# Patient Record
Sex: MALE | Race: WHITE | NOT HISPANIC OR LATINO | Employment: UNEMPLOYED | URBAN - METROPOLITAN AREA
[De-identification: names, ages, dates, MRNs, and addresses within clinical notes are randomized per-mention and may not be internally consistent; named-entity substitution may affect disease eponyms.]

---

## 2017-01-09 ENCOUNTER — GENERIC CONVERSION - ENCOUNTER (OUTPATIENT)
Dept: OTHER | Facility: OTHER | Age: 57
End: 2017-01-09

## 2017-01-12 ENCOUNTER — ALLSCRIPTS OFFICE VISIT (OUTPATIENT)
Dept: OTHER | Facility: OTHER | Age: 57
End: 2017-01-12

## 2017-01-19 ENCOUNTER — GENERIC CONVERSION - ENCOUNTER (OUTPATIENT)
Dept: OTHER | Facility: OTHER | Age: 57
End: 2017-01-19

## 2017-03-20 ENCOUNTER — GENERIC CONVERSION - ENCOUNTER (OUTPATIENT)
Dept: OTHER | Facility: OTHER | Age: 57
End: 2017-03-20

## 2017-06-01 ENCOUNTER — GENERIC CONVERSION - ENCOUNTER (OUTPATIENT)
Dept: OTHER | Facility: OTHER | Age: 57
End: 2017-06-01

## 2017-07-07 ENCOUNTER — ALLSCRIPTS OFFICE VISIT (OUTPATIENT)
Dept: OTHER | Facility: OTHER | Age: 57
End: 2017-07-07

## 2017-07-07 DIAGNOSIS — I25.10 ATHEROSCLEROTIC HEART DISEASE OF NATIVE CORONARY ARTERY WITHOUT ANGINA PECTORIS: ICD-10-CM

## 2017-07-07 DIAGNOSIS — E78.5 HYPERLIPIDEMIA: ICD-10-CM

## 2017-07-10 ENCOUNTER — ALLSCRIPTS OFFICE VISIT (OUTPATIENT)
Dept: OTHER | Facility: OTHER | Age: 57
End: 2017-07-10

## 2018-01-10 NOTE — PROGRESS NOTES
Assessment  Assessed    1  Acute pericarditis (420 90) (I30 9)   2  Hypertension (401 9) (I10)   3  Tachycardia (785 0) (R00 0)    Plan  Acute pericarditis, Tachycardia    · Colchicine 0 6 MG Oral Tablet; 1 tab bid   Rx By: Jose Luis Pack; Dispense: 30 Days ; #:60 Tablet; Refill: 3; For: Acute pericarditis, Tachycardia; JUAN = N; Verified Transmission to [facility]/PHARMACY #[id number] Last Updated By: System, SureScripts; 12/23/2016 2:36:55 PM  Atherosclerosis of coronary artery    · Metoprolol Tartrate 25 MG Oral Tablet; TAKE 0 5 TABLET Every twelve hours   Rx By: Jose Luis Pack; Dispense: 30 Days ; #:30 Tablet; Refill: 3; For: Atherosclerosis of coronary artery; JUAN = N; Verified Transmission to [facility]/PHARMACY #[id number] Last Updated By: System, SureScripts; 12/23/2016 2:36:56 PM   · EKG/ECG- POC; Status:Complete;   Done: 18SBC9582   Perform: In Office; [phone]; Last Updated By:Mira Eastman; 12/23/2016 2:11:38 PM;Ordered; For:Atherosclerosis of coronary artery; Ordered By:Ilene Lopez; Hypertension    · Aspirin  MG Oral Tablet Delayed Release; TAKE 1 TABLET BY MOUTH  ONCE DAILY   Rx By: Jose Luis Pack; Dispense: 30 Days ; #:90 Tablet Delayed Release; Refill: 3; For: Hypertension; JUAN = N; Verified Transmission to [facility]/PHARMACY #[id number] Last Updated By: System, SureScgregor; 12/23/2016 2:42:20 PM  Tachycardia    · Eat a low fat and low cholesterol diet ; Status:Complete;   Done: 24LXW0373   Ordered; For:Tachycardia; Ordered By:Yoon Lopez;   · Restrict the salt in your diet by avoiding highly salted foods ; Status:Complete;   Done:  99KPO7972   Ordered; For:Tachycardia; Ordered By:Yoon Lopez;   · 1 - Kimo Toledo  (Cardiology) Physician Referral  Consult Only: the expectation is that  the referring provider will communicate back to the patient on treatment options    Evaluation and Treatment: the expectation is that the referred to provider will  communicate back to the patient on treatment options  Status: Complete  Done:  17CSX3014   Ordered; For: Tachycardia; Ordered By: Kari Lowery Performed:  Due: 28BCR6589; Last Updated By: Jose Berry; 12/23/2016 2:37:48 PM  Care Summary provided  : Yes    Discussion/Summary  Cardiology Discussion Summary Free Text Note Form [facility]:   Mr Suzy Cunningham presents to our office today for a recent hospitalization follow-up visit with Pericarditis  Mr Suzy Cunningham stopped taking metoprolol on his own due to what he states was "blood pressure  Did not actually take his blood pressure  Has not taken this for a week  He is tachycardic   I educated him on his medical regimen and the reasons for taking medicine  His blood pressure is 120/70 and  BPN  He will decrease EC ASA 1 pill three times a day for 5 days then 1 pill 2 times a day for 5 days then 1 pill daily, Metoprolol 12 5mg Q12 hours, take Linisopril at 6pm  Continue Colchicine 0 6mg BID  Follow up with Dr Price Torres in one month  Chief Complaint  Chief Complaint Free Text Note Form: Pt here for a hospital f/u  Pt c/o SOB      History of Present Illness  Cardiology HPI Free Text Note Form [facility]: Mr Jono Mcnamara is a 49year old male with a known past medical history of HTN, COPD, Tobacco abuse, CAD sp stent to RCA 2012, HTN,and Colitis  He presented to [address] [place] [address] with complaints of chest pain described as sharp chest pressure across his entire chest, aggravated with lying down and deep inspiration, relieved with sitting up forward  EKG showed localized ST elevations   He underwent a cardiac catheterization which did not show any progression of cardiac disease, left main vessel is normal-sized, LAD 20% stenosis  Circumflex showed minor luminal irregularities  Midcircumflex 70% stenosis, RCA medium size when her luminal irregularities 0% stenosis at the site of the prior stent  He was diagnosed with acute pericarditis  2-D echocardiogram  Left ventricular systolic function  Upper limits of normal, EF 55%, mild aortic valve stenosis  Trace aortic valve regurgitation, trace tricuspid valve regurgitation, trace pericardial effusion  He was started on high-dose aspirin 650 mg 3 times a day and Colchicine BID  Chest pain improved  Today Mr Lulú Gallagher presents to our office for recent hospitalization follow-up visit  She denies chest pain, palpitations, lightheadedness, or dizziness  He continues to smoke and has no plans on quitting taking all his medications as prescribed  He admits to dyspnea with moderate exertion  He is not taking his medications appropriately  His stop his metoprolol due to what he states his hypotension, but has not checked his blood pressure  He was experiencing some lightheadedness and dizziness but this stopped this medication on his own  Review of Systems  Cardiology Male ROS:     Cardiac: No complaints of chest pain, no palpitations, no fainiting  Skin: No complaints of nonhealing sores or skin rash  Genitourinary: No complaints of recurrent urinary tract infections, frequent urination at night, difficult urination, blood in urine, kidney stones, loss of bladder control, no kidney or prostate problems, no erectile dysfunction  Psychological: No complaints of feeling depressed, anxiety, panic attacks, or difficulty concentrating  General: lack of energy/fatigue  Respiratory: shortness of breath, but as noted in HPI  HEENT: No complaints of serious problems, hearing problems, nose problems, throat problems, or snoring  Gastrointestinal: No complaints of liver problems, nausea, vomiting, heartburn, constipation, bloody stools, diarrhea, problems swallowing, adbominal pain, or rectal bleeding  Hematologic: No complaints of bleeding disorders, anemia, blood clots, or excessive brusing     Neurological: No complaints of numbness, tingling, dizziness, weakness, seizures, headaches, syncope or fainting, AM fatigue, daytime sleepiness, no witnessed apnea episodes  Musculoskeletal: No complaints of arthritis, back pain, or painfull swelling  Active Problems  Problems    1  Abdominal pain (789 00) (R10 9)   2  Abnormal MRI, cervical spine (793 7) (R93 7)   3  Arm paresthesia, left (782 0) (R20 2)   4  Asthma (493 90) (J45 909)   5  Atherosclerosis of coronary artery (414 00) (I25 10)   6  Cervical herniated disc (722 0) (M50 20)   7  Cervical pain (neck) (723 1) (M54 2)   8  Cervical radiculopathy (723 4) (M54 12)   9  Cervical spinal stenosis (723 0) (M48 02)   10  Cervical spinal stenosis (723 0) (M48 02)   11  Cervicalgia (723 1) (M54 2)   12  Chronic obstructive pulmonary disease (496) (J44 9)   13  Colon cancer screening (V76 51) (Z12 11)   14  Diarrhea (787 91) (R19 7)   15  Dyslipidemia (272 4) (E78 5)   16  History of right hemicolectomy (V45 89) (Z90 49)   17  Hypertension (401 9) (I10)   18  Instability of shoulder joint, 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)   19  Left shoulder pain (719 41) (M25 512)   20  Myelopathy, spondylogenic, 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)   21  Need for immunization against influenza (V04 81) (Z23)   22  Oral candidiasis (112 0) (B37 0)   23  Presence of stent in coronary artery in patient with coronary artery disease    (414 01,V45 82) (I25 10,Z95 5)   24  Scoliosis (737 30) (M41 9)   25  Subacromial impingement of left shoulder (726 19) (M75 42)    Past Medical History  Problems    1  History of Anxiety (300 00) (F41 9)   2  Asthma (493 90) (J45 909)   3  History of Granular Cell Carcinoma (199 1)   4  History of esophageal reflux (V12 79) (Z87 19)   5  History of gastritis (V12 79) (Z87 19)   6  Old myocardial infarction (412) (I25 2)   7  History of Pneumonia (V12 61)   8  History of Type 2 diabetes, diet controlled (250 00) (E11 9)  Active Problems And Past Medical History Reviewed: The active problems and past medical history were reviewed and updated today  Surgical History  Problems    1   History of Cardiac Cath Procedure Summary   2  History of Colon Surgery   3  History of Right Hemicolectomy   4  History of Tonsillectomy  Surgical History Reviewed: The surgical history was reviewed and updated today  Family History  Father    1  Family history of Coronary Artery Disease (V17 49)   2  Family history of Crohn's Disease   3  Family history of Stroke Syndrome (V17 1)  Unknown    4  Family history of diabetes mellitus (V18 0) (Z83 3)  Family History Reviewed: The family history was reviewed and updated today  Social History  Problems    · Alcohol Use (History)   · Cigars (3  A Day) (305 1)   · Current every day smoker (305 1) (F17 200)   · Drinks beer (V49 89) (Z78 9)   · Marital History -    · Tobacco use (305 1) (Z72 0)  Social History Reviewed: The social history was reviewed and updated today  The social history was reviewed and is unchanged  Current Meds   1  Albuterol Sulfate (2 5 MG/3ML) 0 083% Inhalation Nebulization Solution; USE 1 UNIT   DOSE EVERY 4-6 HOURS AS NEEDED FOR WHEEZING ; Therapy: 37ZIN5809 to (Last Rx:06Jun2016)  Requested for: 06Jun2016 Ordered   2  Aspirin 81 MG Oral Tablet Delayed Release; Take one tablet once daily; Therapy: 18NQJ5794 to (Evaluate:02Jan2017)  Requested for: 77DXW7223; Last   Rx:06Jun2016 Ordered   3  Dulera 200-5 MCG/ACT Inhalation Aerosol; INHALE 2 PUFFS AT 12 HOUR INTERVALS   (MORNING AND EVENING); Therapy: 38HNK1977 to (Last Kalpesh Held)  Requested for: 94HXO4124 Ordered   4  Gabapentin 300 MG Oral Capsule; TAKE 1 CAPSULE 3 TIMES DAILY; Therapy: 00NZJ5460 to (Evaluate:01Jun2017)  Requested for: 98OMU8923; Last   YV:27NHP0808 Ordered   5  Lisinopril 2 5 MG Oral Tablet; TAKE 1 TABLET DAILY; Therapy: 86EXT9862 to [phone])  Requested for: 93DFR5767; Last   Rx:08Mar2016 Ordered   6  Metoprolol Tartrate 25 MG Oral Tablet; Take 1 tablet twice daily;    Therapy: 45WCN0477 to (Evaluate:01Jun2017)  Requested for: 33EBE0543; Last   IX:10SHK0270 Ordered   7  Spiriva Respimat 2 5 MCG/ACT Inhalation Aerosol Solution; inhale 2 puffs once daily; Therapy: 84EXI5724 to (Toomsborofidencio Garcias)  Requested for: 93LUD0994; Last   Rx:13Mar2016 Ordered   8  Ventolin  (90 Base) MCG/ACT Inhalation Aerosol Solution; inhale 1 to 2 puffs   every 4 to 6 hours if needed; Therapy: 10ZJR8240 to (Luigi Latha)  Requested for: 29Nov2016; Last   Rx:29Nov2016 Ordered  Medication List Reviewed: The medication list was reviewed and updated today  Allergies  Medication    1  No Known Drug Allergies    Vitals  Vital Signs    Recorded: 26Dec2016 11:21AM Recorded: 98Veg1517 02:03PM   Heart Rate  99   Systolic 700, RUE, Sitting 100, LUE, Sitting   Diastolic 70, RUE, Sitting 62, LUE, Sitting   Height  5 ft 6 in   Weight  151 lb 4 oz   BMI Calculated  24 41   BSA Calculated  1 78     Physical Exam    Constitutional   General appearance: No acute distress, well appearing and well nourished  Neck   Neck and thyroid: Normal, supple, trachea midline, no thyromegaly  Pulmonary   Respiratory effort: No increased work of breathing or signs of respiratory distress  Auscultation of lungs: Clear to auscultation, no rales, no rhonchi, no wheezing, good air movement  Cardiovascular   Examination of extremities for edema and/or varicosities: Normal     Abdomen   Abdomen: Non-tender and no distention  Musculoskeletal Gait and station: Normal gait  Neurologic - Speech: Normal     Psychiatric - Orientation to person, place, and time: Normal  Mood and affect: Normal       Future Appointments    Date/Time Provider Specialty Site   01/12/2017 09:40 AM ARGENIS Merchant   Cardiology [facility]   03/16/2017 01:10 PM Rima Osei, DO Internal Medicine [address] PCP     Signatures   Electronically signed by : Mary Ann Frankel; Dec 26 2016 11:24AM EST                       (Author)    Electronically signed by : Heaven Dunn Shandra Cintron; Dec 26 2016 11:25AM EST                       (Author)    Electronically signed by : ARGENIS Springer PhD; Jan 9 2017  4:18PM EST                       (Author)

## 2018-01-10 NOTE — RESULT NOTES
Message   Recorded as Task   Date: 05/31/2016 10:52 AM, Created By: Jairo Fuchs   Task Name: Miscellaneous   Assigned To: [facility] [facility]   Regarding Patient: Jena Saldana, Status: Active   Comment:    Jairo Fuchs - 31 May 2016 10:52 AM     TASK CREATED  Caller: Self; [phone] (Home); [phone] x,,,,, (Work)  Patient called to see if he is able to just schedule the inj  He spoke w/ Dr Sade Galindo patient said Dr Sade Galindo said to go for it  Patient is being r/s from an appt spot that you will no longer be here  He stated he can not wait until 6/16 to r/s  Please advise  Thank you   Rafiq Dominguez - 31 May 2016 1:06 PM     TASK REPLIED TO: Previously Assigned To [facility] [facility],Team  There was a task on 6/18 where I said we can schedule for JAQUI  Brittnee Chen - 31 May 2016 1:17 PM     TASK REPLIED TO: Previously Assigned To [facility] [facility],Team  Spoke with pt and scheduled for JAQUI 6-22-16  Pre procedure instructions reviewed:NPO 1 hr prior,needs ,denies anticoags and aware to call with illness/antibiotics  Verbalizes understanding  Signatures   Electronically signed by :  Rossy Gipson, ; May 31 2016  1:17PM EST                       (Author)

## 2018-01-10 NOTE — PROGRESS NOTES
History of Present Illness  Care Coordination Encounter Information:   Type of Encounter: Telephonic    Spoke to Patient   Voicemail left, await a call back  Care Coordination SL Nurse [facility]:   The reason for call is to discuss outreach for follow up/needed services  Follow up call missed PCP office visit 3/16  Active Problems    1  Abdominal pain (789 00) (R10 9)   2  Acute coronary syndrome (411 1) (I24 9)   3  Acute pericarditis (420 90) (I30 9)   · 12/2016- also had cath, RCA stent patent   4  Arm paresthesia, left (782 0) (R20 2)   5  Asthma (493 90) (J45 909)   6  Atherosclerosis of coronary artery (414 00) (I25 10)   7  Cervical herniated disc (722 0) (M50 20)   8  Cervical spinal stenosis (723 0) (M48 02)   9  Colon cancer screening (V76 51) (Z12 11)   10  COPD (chronic obstructive pulmonary disease) (496) (J44 9)   11  Coronary artery disease (414 00) (I25 10)   12  Diarrhea (787 91) (R19 7)   13  Dyslipidemia (272 4) (E78 5)   14  History of right hemicolectomy (V45 89) (Z98 890,Z90 49)   15  Hypertension (401 9) (I10)   16  Leukocytosis (288 60) (D72 829)   17  Need for immunization against influenza (V04 81) (Z23)   18  Oral candidiasis (112 0) (B37 0)   19  Presence of stent in coronary artery in patient with coronary artery disease    (414 01,V45 82) (I25 10,Z95 5)   20  S/P drug eluting coronary stent placement (V45 82) (Z95 5)   21  Scoliosis (737 30) (M41 9)   22  Subacromial impingement of left shoulder (726 19) (M75 42)   23  Tachycardia (785 0) (R00 0)    Past Medical History    1  History of Anxiety (300 00) (F41 9)   2  Asthma (493 90) (J45 909)   3  History of Colitis (558 9) (K52 9)   4  History of Granular Cell Carcinoma (199 1)   5  History of chest pain (V13 89) (Z87 898)   6  History of esophageal reflux (V12 79) (Z87 19)   7  History of gastritis (V12 79) (Z87 19)   8  Old myocardial infarction (412) (I25 2)   9  History of Pneumonia (V12 61)   10   History of Type 2 diabetes, diet controlled (250 00) (E11 9)    Surgical History    1  History of Cardiac Cath Procedure Summary   2  History of Colon Surgery   3  History of Right Hemicolectomy   4  History of Stent Indications: Acute Myocardial Infarction   5  History of Tonsillectomy    Family History  Father    1  Family history of Coronary Artery Disease (V17 49)   2  Family history of Crohn's Disease   3  Family history of Stroke Syndrome (V17 1)  Unknown    4  Family history of diabetes mellitus (V18 0) (Z83 3)    Social History    · Alcohol Use (History)   · Cigars (3  A Day) (305 1)   · Current every day smoker (305 1) (F17 200)   · Drinks beer (V49 89) (Z78 9)   · Marital History -    · Tobacco use (305 1) (Z72 0)    Current Meds    1  Dulera 200-5 MCG/ACT Inhalation Aerosol; INHALE 2 PUFFS AT 12 HOUR INTERVALS   (MORNING AND EVENING); Therapy: 91XRT5467 to (Ricky Pfeiffer)  Requested for: 70VOX4160 Ordered    2  Metoprolol Tartrate 25 MG Oral Tablet; TAKE 0 5 TABLET Every twelve hours; Therapy: 88EAC8830 to (Evaluate:08Ezo6594)  Requested for: 39Ins0215; Last   Rx:04Cgu9053 Ordered    3  Gabapentin 300 MG Oral Capsule; TAKE 1 CAPSULE 3 TIMES DAILY; Therapy: 84UVR2997 to (Evaluate:60Ioo6814)  Requested for: 82Jyo2857; Last   Rx:67Ytz0968 Ordered    4  Spiriva Respimat 2 5 MCG/ACT Inhalation Aerosol Solution; inhale 2 puffs once daily; Therapy: 77KSG3228 to (Allyn Gandara)  Requested for: 55KNA3642; Last   Rx:13Mar2016 Ordered   5  Ventolin  (90 Base) MCG/ACT Inhalation Aerosol Solution; inhale 1 to 2 puffs   every 4 to 6 hours if needed; Therapy: 32CHJ1846 to (Armando Read)  Requested for: 88VED2220; Last   Rx:29Nov2016 Ordered    6  Albuterol Sulfate (2 5 MG/3ML) 0 083% Inhalation Nebulization Solution; USE 1 UNIT   DOSE EVERY 4-6 HOURS AS NEEDED FOR WHEEZING ; Therapy: 39ODU7466 to (Last Rx:37Jag1366)  Requested for: 17Kzx5129 Ordered    7   Aspirin EC 81 MG Oral Tablet Delayed Release; TAKE 1 TABLET DAILY AS DIRECTED; Therapy: 40Nxe6289 to (Evaluate:12May2017)  Requested for: 59BAT4863; Last   Rx:12Jan2017 Ordered   8  Lisinopril 2 5 MG Oral Tablet; TAKE 1 TABLET DAILY; Therapy: 52RZW5673 to [phone])  Requested for: 13RGJ0787; Last   Rx:08Mar2016 Ordered    Allergies    1  No Known Drug Allergies    End of Encounter Meds    1  Dulera 200-5 MCG/ACT Inhalation Aerosol; INHALE 2 PUFFS AT 12 HOUR INTERVALS   (MORNING AND EVENING); Therapy: 18ZSZ8718 to (Last [place])  Requested for: 15JJE1307 Ordered    2  Metoprolol Tartrate 25 MG Oral Tablet; TAKE 0 5 TABLET Every twelve hours; Therapy: 65KRC9207 to (Evaluate:22Apr2017)  Requested for: 10Qmo3548; Last   Rx:34Wnm9503 Ordered    3  Gabapentin 300 MG Oral Capsule; TAKE 1 CAPSULE 3 TIMES DAILY; Therapy: 65DPR9350 to (Evaluate:39Fqx2877)  Requested for: 54Ygl4509; Last   Rx:29Dec2016 Ordered    4  Spiriva Respimat 2 5 MCG/ACT Inhalation Aerosol Solution; inhale 2 puffs once daily; Therapy: 97SLB8563 to (Josefa Antonio)  Requested for: 54ZDU8008; Last   Rx:13Mar2016 Ordered   5  Ventolin  (90 Base) MCG/ACT Inhalation Aerosol Solution; inhale 1 to 2 puffs   every 4 to 6 hours if needed; Therapy: 13PBJ3738 to [facility]  Requested for: 10BLC3907; Last   Rx:29Nov2016 Ordered    6  Albuterol Sulfate (2 5 MG/3ML) 0 083% Inhalation Nebulization Solution; USE 1 UNIT   DOSE EVERY 4-6 HOURS AS NEEDED FOR WHEEZING ; Therapy: 07LEP2617 to (Last Rx:44Ctn1743)  Requested for: 27Eku6629 Ordered    7  Aspirin EC 81 MG Oral Tablet Delayed Release; TAKE 1 TABLET DAILY AS DIRECTED; Therapy: 72Yiv1740 to (Evaluate:12May2017)  Requested for: 36XLZ7166; Last   Rx:12Jan2017 Ordered   8  Lisinopril 2 5 MG Oral Tablet; TAKE 1 TABLET DAILY;    Therapy: 50SSU0085 to [phone])  Requested for: 81ORQ9215; Last   Rx:08Mar2016 Ordered    Patient Care Team    Care Team Member Role Specialty Office Number Amy Byrnes   [phone]   Tana Puri MD Referring Pain Management [phone]   Ioana IVORY   Specialist Neurosurgery [phone]   Maritza Heredia MD Specialist Colon and Rectal Surgery [phone]   [facility] [address] [address] 1478   Electronically signed by : Santino Rowland, ; Mar 20 2017  2:36PM EST                       (Author)

## 2018-01-11 NOTE — RESULT NOTES
Message   Recorded as Task   Date: 05/16/2016 02:40 PM, Created By: Jovany Guidry   Task Name: Follow Up   Assigned To: [facility] surgery sched,Team   Regarding Patient: Brittany Maguire, Status: Active   Comment:    Yue Stevenson - 16 May 2016 2:40 PM     TASK CREATED  Caller: Self; General Medical Question; [phone] (Home)    *******FYI***************  Message transferred from [facility]  to RN triage line today at [phone]  Pt states he needs to set up follow up appt with Dr Jenny Barboza, states he had appt with Dr Sade Galindo today, pt requesting CB so he can explain  States he wants pt to do shots right away  Requesting CB    I spoke with pt, states Dr Sade Galindo advised pt he is a candidate for surgery, but pt cannot have surgery at this time  States Dr Sade Galindo is in agreement that he can have injections and medications and will follow up in 3 months  Advised that I can schedule OV 5/24/16 at 0915  I advised that [place] Jenny Barboza [place] [facility] [place]  Pt upset that he will not be getting injection at that time  Advised that Dr Jenny Barboza will need to eval   ******************   Rafiq Dominguez - 17 May 2016 9:44 AM     TASK REPLIED TO: Previously Assigned To [facility],Team  D/w Dr Sade Galindo  I've also discussed JAQUI with patient  We can schedule patient for JAQUI  Please review risk of infection, bleeding nerve injury with him  Nereyda Murrell - 17 May 2016 11:10 AM     TASK EDITED   Dyke Rasgenesis - 18 May 8630 8:12 PM     TASK EDITED  Patient scheduled for 5/24/2016 - Should this be cx and just scheduled for Injection? Thanks  Rafiq Dominguez - 18 May 2016 1:45 PM     TASK REPLIED TO: Previously Assigned To Rafiq Dominguez  We can schedule JAQUI  If the patient needs to discuss anything or go over procedure, we can keep povs   Brittnee Chen - 31 May 2016 1:17 PM     TASK REPLIED TO: Previously Assigned To [facility] surgery sched,Team  Addressed in another task          Signatures   Electronically signed by : [facility], ; May 31 2016  1:18PM EST                       (Author)

## 2018-01-11 NOTE — PROGRESS NOTES
History of Present Illness  Care Coordination Encounter Information:   Type of Encounter: Telephonic    Spoke to Patient   Voicemail left, await a call back  Care Coordination SL Nurse [facility]:   The reason for call is to discuss outreach for follow up/needed services  Follow up checking for medication compliance, hospital discharge on 12/8/16, cardiology office visit 12/23/16  This is second phone call  Patient returned my call  He denies chest pain, states he is short of breath but not more than usual, has a history of COPD  I asked if he was taking his medications as prescribed, there is a history of him not taking certain medications  He stated he is following all instructions as per cardiology office visit on 12/23/16  He is aware of his upcoming appointment on 1/12 with cardiology  He did ask if he should take his Aspirin 81 mg along with Aspirin  mg  In reviewing cardiology note he should be on the 325 mg only,I discussed this with him, verbal understanding  This is a follow up call for PCP office, 12/27/16 hospital follow up appointment was cancelled   Active Problems    1  Abdominal pain (789 00) (R10 9)   2  Abnormal MRI, cervical spine (793 7) (R93 7)   3  Acute coronary syndrome (411 1) (I24 9)   4  Acute pericarditis (420 90) (I30 9)   5  Arm paresthesia, left (782 0) (R20 2)   6  Asthma (493 90) (J45 909)   7  Atherosclerosis of coronary artery (414 00) (I25 10)   8  Cervical herniated disc (722 0) (M50 20)   9  Cervical pain (neck) (723 1) (M54 2)   10  Cervical radiculopathy (723 4) (M54 12)   11  Cervical spinal stenosis (723 0) (M48 02)   12  Cervical spinal stenosis (723 0) (M48 02)   13  Cervicalgia (723 1) (M54 2)   14  Chronic obstructive pulmonary disease (496) (J44 9)   15  Colon cancer screening (V76 51) (Z12 11)   16  COPD (chronic obstructive pulmonary disease) (496) (J44 9)   17  Diarrhea (787 91) (R19 7)   18  Dyslipidemia (272 4) (E78 5)   19   History of right hemicolectomy (V45 89) (Z90 49)   20  Hypertension (401 9) (I10)   21  Instability of shoulder joint, 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)   22  Left shoulder pain (719 41) (M25 512)   23  Leukocytosis (288 60) (D72 829)   24  Myelopathy, spondylogenic, 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)   25  Need for immunization against influenza (V04 81) (Z23)   26  Oral candidiasis (112 0) (B37 0)   27  Presence of stent in coronary artery in patient with coronary artery disease    (414 01,V45 82) (I25 10,Z95 5)   28  Scoliosis (737 30) (M41 9)   29  Subacromial impingement of left shoulder (726 19) (M75 42)   30  Tachycardia (785 0) (R00 0)    Past Medical History    1  History of Anxiety (300 00) (F41 9)   2  Asthma (493 90) (J45 909)   3  History of Colitis (558 9) (K52 9)   4  History of Granular Cell Carcinoma (199 1)   5  History of chest pain (V13 89) (Z87 898)   6  History of esophageal reflux (V12 79) (Z87 19)   7  History of gastritis (V12 79) (Z87 19)   8  Old myocardial infarction (412) (I25 2)   9  History of Pneumonia (V12 61)   10  History of Type 2 diabetes, diet controlled (250 00) (E11 9)    Surgical History    1  History of Cardiac Cath Procedure Summary   2  History of Colon Surgery   3  History of Right Hemicolectomy   4  History of Stent Indications: Acute Myocardial Infarction   5  History of Tonsillectomy    Family History  Father    1  Family history of Coronary Artery Disease (V17 49)   2  Family history of Crohn's Disease   3  Family history of Stroke Syndrome (V17 1)  Unknown    4  Family history of diabetes mellitus (V18 0) (Z83 3)    Social History    · Alcohol Use (History)   · Cigars (3  A Day) (305 1)   · Current every day smoker (305 1) (F17 200)   · Drinks beer (V49 89) (Z78 9)   · Marital History -    · Tobacco use (305 1) (Z72 0)    Current Meds    1  Colchicine 0 6 MG Oral Tablet; 1 tab bid; Therapy: 35Vfn1342 to (Evaluate:92Vxc7129)  Requested for: 59Tfj5790; Last   Rx:89Tvm4183 Ordered    2   Chad Delacruz 200-5 MCG/ACT Inhalation Aerosol; INHALE 2 PUFFS AT 12 HOUR INTERVALS   (MORNING AND EVENING); Therapy: 13RRZ0486 to (Ricky Chapa)  Requested for: 99DMV4003 Ordered    3  Aspirin 81 MG Oral Tablet Delayed Release; Take one tablet once daily; Therapy: 13OGP5421 to (Evaluate:02Jan2017)  Requested for: 26KUM3890; Last   Rx:06Jun2016 Ordered   4  Metoprolol Tartrate 25 MG Oral Tablet; TAKE 0 5 TABLET Every twelve hours; Therapy: 25ZFY1878 to (Evaluate:22Apr2017)  Requested for: 75Hkp0929; Last   Rx:93Vme0340 Ordered    5  Gabapentin 300 MG Oral Capsule; TAKE 1 CAPSULE 3 TIMES DAILY; Therapy: 51OBU8883 to (Evaluate:24Dec2017)  Requested for: 35Afq7042; Last   Rx:44Grp0386 Ordered    6  Albuterol Sulfate (2 5 MG/3ML) 0 083% Inhalation Nebulization Solution; USE 1 UNIT   DOSE EVERY 4-6 HOURS AS NEEDED FOR WHEEZING ; Therapy: 53LBG9172 to (Last Rx:06Jun2016)  Requested for: 06Jun2016 Ordered   7  Spiriva Respimat 2 5 MCG/ACT Inhalation Aerosol Solution; inhale 2 puffs once daily; Therapy: 75HIC1205 to (Jersey Adkins)  Requested for: 29PFB2093; Last   Rx:13Mar2016 Ordered   8  Ventolin  (90 Base) MCG/ACT Inhalation Aerosol Solution; inhale 1 to 2 puffs   every 4 to 6 hours if needed; Therapy: 50HHQ5262 to (Sydnee Cheek)  Requested for: 19MUI4423; Last   Rx:29Nov2016 Ordered    9  Aspirin  MG Oral Tablet Delayed Release; TAKE 1 TABLET BY MOUTH ONCE   DAILY; Therapy: 51Jyd1420 to (Evaluate:22Apr2017)  Requested for: 83Tvk6073; Last   Rx:57Gnn6120 Ordered   10  Lisinopril 2 5 MG Oral Tablet; TAKE 1 TABLET DAILY; Therapy: 76BTQ2622 to 72 539 49 26)  Requested for: 23YAZ7240; Last    Rx:08Mar2016 Ordered    Allergies    1  No Known Drug Allergies    End of Encounter Meds    1  Colchicine 0 6 MG Oral Tablet; 1 tab bid; Therapy: 54Lul8091 to (Evaluate:22Apr2017)  Requested for: 25Yas4896; Last   Rx:13Ysj1762 Ordered    2   Dulera 200-5 MCG/ACT Inhalation Aerosol; INHALE 2 PUFFS AT 12 HOUR INTERVALS   (MORNING AND EVENING); Therapy: 25VBN1412 to (Ricky Phillipsise Arron)  Requested for: 00FFK6337 Ordered    3  Aspirin 81 MG Oral Tablet Delayed Release; Take one tablet once daily; Therapy: 88VND7258 to (Evaluate:02Jan2017)  Requested for: 21MMP5312; Last   Rx:06Jun2016 Ordered   4  Metoprolol Tartrate 25 MG Oral Tablet; TAKE 0 5 TABLET Every twelve hours; Therapy: 83TOQ2727 to (Evaluate:22Apr2017)  Requested for: 54Apr1480; Last   Rx:62Tcg8648 Ordered    5  Gabapentin 300 MG Oral Capsule; TAKE 1 CAPSULE 3 TIMES DAILY; Therapy: 74GZX9079 to (Evaluate:66Yyb6776)  Requested for: 97Kto5497; Last   Rx:29Dec2016 Ordered    6  Albuterol Sulfate (2 5 MG/3ML) 0 083% Inhalation Nebulization Solution; USE 1 UNIT   DOSE EVERY 4-6 HOURS AS NEEDED FOR WHEEZING ; Therapy: 56YZG7676 to (Last Rx:06Jun2016)  Requested for: 06Jun2016 Ordered   7  Spiriva Respimat 2 5 MCG/ACT Inhalation Aerosol Solution; inhale 2 puffs once daily; Therapy: 24YGR1310 to (Stephen Mejía)  Requested for: 96RJP7625; Last   Rx:13Mar2016 Ordered   8  Ventolin  (90 Base) MCG/ACT Inhalation Aerosol Solution; inhale 1 to 2 puffs   every 4 to 6 hours if needed; Therapy: 44HDP3245 to (Miley Mortensen)  Requested for: 02LRO8806; Last   Rx:29Nov2016 Ordered    9  Aspirin  MG Oral Tablet Delayed Release; TAKE 1 TABLET BY MOUTH ONCE   DAILY; Therapy: 78Kbk2381 to (Evaluate:22Apr2017)  Requested for: 32Ujh7552; Last   Rx:79Adq8880 Ordered   10  Lisinopril 2 5 MG Oral Tablet; TAKE 1 TABLET DAILY; Therapy: 73EMV0419 to [phone])  Requested for: 91ZZG2551; Last    Rx:08Mar2016 Ordered    Future Appointments    Date/Time Provider Specialty Site   01/12/2017 09:40 AM ARGENIS Rivera   Cardiology [facility]   03/16/2017 01:10 PM Hue Schofield, DO Internal Medicine [address] [facility] PCP     Patient Care Team    Care Team Member Role Specialty Office Number Amrit Borja   [phone]   Karlos Moya MD Referring Pain Management [phone]   uSng IVORY   Specialist Neurosurgery [phone]   oNra Ramirez MD Specialist Colon and Rectal Surgery [phone]   [facility] [address] [phone]   Electronically signed by : Briant Goldberg, ; Jan 9 2017 11:35AM EST                       (Author)    Electronically signed by : Briant Goldberg, ; Jan 9 2017  1:16PM EST                       (Author)    Electronically signed by : Briant Goldberg, ; Jan 9 2017  1:18PM EST                       (Author)

## 2018-01-12 NOTE — RESULT NOTES
Message   Recorded as Task   Date: 06/24/2016 10:18 AM, Created By: Aneesh Sotelo   Task Name: Miscellaneous   Assigned To: [facility] [facility]   Regarding Patient: Elisha Chinchilla, Status: In Progress   Comment:    MayEdelmira garciau - 24 Jun 2016 10:18 AM     TASK CREATED  Received ok to hold aspirin x 6 days from Dr Jhony Moore dated 6-23-16    Pt's JAQUI was cx on 6-22-16 b/c pt did not hold asa-pt was instructed at that time by Dr Unique Reed to start holding aspirin and pt was rescheduled for JAQUI on 6-28-16  VM left for pt to cb-want to inform pt we did receive ok to hold asa and confirm with pt that he is holding asa  MayradhaBrittnee - 24 Jun 2016 10:18 AM     TASK IN PROGRESS   MayBrittnee garcia - 24 Jun 2016 10:21 AM     TASK REASSIGNED: Previously Assigned To [facility] [facility],Team  Spoke with pt and confirmed he has been holding aspirin since 6-22-16  Pt aware we recieved ok from Dr Denny Bro office  Pt aware no NSAIDS 4 days prior to injection-pt denies taking any  Signatures   Electronically signed by :  Jesse Clement, ; Jun 24 2016 10:21AM EST                       (Author)

## 2018-01-12 NOTE — PROGRESS NOTES
History of Present Illness  Care Coordination Encounter Information:   Type of Encounter: Telephonic    Spoke to Patient   Voicemail left, await a call back  Care Coordination SL Nurse Becca Shen:   The reason for call is to discuss outreach for follow up/needed services  Follow up cardiology office visit on 1/12/17, checking for medication compliance  Active Problems    1  Abdominal pain (789 00) (R10 9)   2  Acute coronary syndrome (411 1) (I24 9)   3  Acute pericarditis (420 90) (I30 9)   · 12/2016- also had cath, RCA stent patent   4  Arm paresthesia, left (782 0) (R20 2)   5  Asthma (493 90) (J45 909)   6  Atherosclerosis of coronary artery (414 00) (I25 10)   7  Cervical herniated disc (722 0) (M50 20)   8  Cervical spinal stenosis (723 0) (M48 02)   9  Colon cancer screening (V76 51) (Z12 11)   10  COPD (chronic obstructive pulmonary disease) (496) (J44 9)   11  Coronary artery disease (414 00) (I25 10)   12  Diarrhea (787 91) (R19 7)   13  Dyslipidemia (272 4) (E78 5)   14  History of right hemicolectomy (V45 89) (Z90 49)   15  Hypertension (401 9) (I10)   16  Leukocytosis (288 60) (D72 829)   17  Need for immunization against influenza (V04 81) (Z23)   18  Oral candidiasis (112 0) (B37 0)   19  Presence of stent in coronary artery in patient with coronary artery disease    (414 01,V45 82) (I25 10,Z95 5)   20  S/P drug eluting coronary stent placement (V45 82) (Z95 5)   21  Scoliosis (737 30) (M41 9)   22  Subacromial impingement of left shoulder (726 19) (M75 42)   23  Tachycardia (785 0) (R00 0)    Past Medical History    1  History of Anxiety (300 00) (F41 9)   2  Asthma (493 90) (J45 909)   3  History of Colitis (558 9) (K52 9)   4  History of Granular Cell Carcinoma (199 1)   5  History of chest pain (V13 89) (Z87 898)   6  History of esophageal reflux (V12 79) (Z87 19)   7  History of gastritis (V12 79) (Z87 19)   8  Old myocardial infarction (412) (I25 2)   9   History of Pneumonia (V12 61) 10  History of Type 2 diabetes, diet controlled (250 00) (E11 9)    Surgical History    1  History of Cardiac Cath Procedure Summary   2  History of Colon Surgery   3  History of Right Hemicolectomy   4  History of Stent Indications: Acute Myocardial Infarction   5  History of Tonsillectomy    Family History  Father    1  Family history of Coronary Artery Disease (V17 49)   2  Family history of Crohn's Disease   3  Family history of Stroke Syndrome (V17 1)  Unknown    4  Family history of diabetes mellitus (V18 0) (Z83 3)    Social History    · Alcohol Use (History)   · Cigars (3  A Day) (305 1)   · Current every day smoker (305 1) (F17 200)   · Drinks beer (V49 89) (Z78 9)   · Marital History -    · Tobacco use (305 1) (Z72 0)    Current Meds    1  Dulera 200-5 MCG/ACT Inhalation Aerosol; INHALE 2 PUFFS AT 12 HOUR INTERVALS   (MORNING AND EVENING); Therapy: 85DVI4517 to (Last Suzanyde Many)  Requested for: 16BGL5464 Ordered    2  Metoprolol Tartrate 25 MG Oral Tablet; TAKE 0 5 TABLET Every twelve hours; Therapy: 36TJU8532 to (Evaluate:82Dvd7338)  Requested for: 39Vka5848; Last   Rx:80Zpr6949 Ordered    3  Gabapentin 300 MG Oral Capsule; TAKE 1 CAPSULE 3 TIMES DAILY; Therapy: 15ROA9781 to (Evaluate:70Iyq7080)  Requested for: 72Bvk6858; Last   Rx:79Uik5893 Ordered    4  Albuterol Sulfate (2 5 MG/3ML) 0 083% Inhalation Nebulization Solution; USE 1 UNIT   DOSE EVERY 4-6 HOURS AS NEEDED FOR WHEEZING ; Therapy: 70UTG4982 to (Last Rx:06Jun2016)  Requested for: 06Jun2016 Ordered   5  Spiriva Respimat 2 5 MCG/ACT Inhalation Aerosol Solution; inhale 2 puffs once daily; Therapy: 85BVO0061 to (Gume Carey)  Requested for: 14WSA4899; Last   Rx:13Mar2016 Ordered   6  Ventolin  (90 Base) MCG/ACT Inhalation Aerosol Solution; inhale 1 to 2 puffs   every 4 to 6 hours if needed; Therapy: 19SYH1574 to ([id number])  Requested for: 65VUW1749; Last   Rx:29Nov2016 Ordered    7   Aspirin EC 81 MG Oral Tablet Delayed Release; TAKE 1 TABLET DAILY AS DIRECTED; Therapy: 04Ara5071 to (Evaluate:12May2017)  Requested for: 44ANC7357; Last   Rx:12Jan2017 Ordered   8  Lisinopril 2 5 MG Oral Tablet; TAKE 1 TABLET DAILY; Therapy: 50VXK5167 to [phone])  Requested for: 63OJA9844; Last   Rx:08Mar2016 Ordered    Allergies    1  No Known Drug Allergies    End of Encounter Meds    1  Dulera 200-5 MCG/ACT Inhalation Aerosol; INHALE 2 PUFFS AT 12 HOUR INTERVALS   (MORNING AND EVENING); Therapy: 17FPC5036 to (Last [facility])  Requested for: 31PVI7275 Ordered    2  Metoprolol Tartrate 25 MG Oral Tablet; TAKE 0 5 TABLET Every twelve hours; Therapy: 19VMK9784 to (Evaluate:22Apr2017)  Requested for: 72Xzf4785; Last   Rx:11Szj2555 Ordered    3  Gabapentin 300 MG Oral Capsule; TAKE 1 CAPSULE 3 TIMES DAILY; Therapy: 43ERE8176 to (Evaluate:87Fxh7386)  Requested for: 66Tkt5322; Last   Rx:29Dec2016 Ordered    4  Albuterol Sulfate (2 5 MG/3ML) 0 083% Inhalation Nebulization Solution; USE 1 UNIT   DOSE EVERY 4-6 HOURS AS NEEDED FOR WHEEZING ; Therapy: 78WBJ0212 to (Last Rx:06Jun2016)  Requested for: 06Jun2016 Ordered   5  Spiriva Respimat 2 5 MCG/ACT Inhalation Aerosol Solution; inhale 2 puffs once daily; Therapy: 22FGG4362 to (Claudetta Altes)  Requested for: 01YQF2524; Last   Rx:13Mar2016 Ordered   6  Ventolin  (90 Base) MCG/ACT Inhalation Aerosol Solution; inhale 1 to 2 puffs   every 4 to 6 hours if needed; Therapy: 99DSB2400 to (Sushant Mcfadden)  Requested for: 32QUR8440; Last   Rx:29Nov2016 Ordered    7  Aspirin EC 81 MG Oral Tablet Delayed Release; TAKE 1 TABLET DAILY AS DIRECTED; Therapy: 31Dzs9216 to (Evaluate:12May2017)  Requested for: 92YZF7428; Last   Rx:12Jan2017 Ordered   8  Lisinopril 2 5 MG Oral Tablet; TAKE 1 TABLET DAILY;    Therapy: 94TNQ4233 to [phone])  Requested for: 22BKO8301; Last   Rx:08Mar2016 Ordered    Future Appointments    Date/Time Provider Specialty Site 03/16/2017 01:10 PM Liliane Snell DO Internal Medicine [address] [facility] PCP     Patient Care Team    Care Team Member Role Specialty Office Number   Maggi Jeffrey   [phone]   Azalea Garcia MD Referring Pain Management [phone]   Rubia IVORY   Specialist Neurosurgery [phone]   Sarah Hill MD Specialist Colon and Rectal Surgery [phone]   [facility] [address]  Cardiology [phone]   Electronically signed by : Startup Threads, ; Jan 19 2017 10:23AM EST                       (Author)

## 2018-01-13 VITALS
HEIGHT: 66 IN | DIASTOLIC BLOOD PRESSURE: 70 MMHG | WEIGHT: 152.06 LBS | SYSTOLIC BLOOD PRESSURE: 108 MMHG | HEART RATE: 88 BPM | BODY MASS INDEX: 24.44 KG/M2

## 2018-01-14 VITALS
BODY MASS INDEX: 24.51 KG/M2 | HEIGHT: 66 IN | SYSTOLIC BLOOD PRESSURE: 128 MMHG | WEIGHT: 152.5 LBS | DIASTOLIC BLOOD PRESSURE: 74 MMHG | HEART RATE: 86 BPM

## 2018-01-14 VITALS
HEART RATE: 60 BPM | SYSTOLIC BLOOD PRESSURE: 122 MMHG | BODY MASS INDEX: 24.16 KG/M2 | WEIGHT: 150.35 LBS | HEIGHT: 66 IN | DIASTOLIC BLOOD PRESSURE: 68 MMHG | TEMPERATURE: 97.9 F

## 2018-01-15 NOTE — RESULT NOTES
Message   Recorded as Task   Date: 04/12/2016 07:55 AM, Created By: Zeny Merrill   Task Name: Follow Up   Assigned To: [facility] [facility]   Regarding Patient: Katt Saldana, Status: Active   Comment:    Zeny Merrill - 12 Apr 2016 7:55 AM     TASK CREATED  Please inform patient, his MRI was reviewed by Dr Gabbie Bustamante (neurologist), she recommended neurosurical consult  I will place order and we can mail it to patient  Brittnee Chen - 12 Apr 2016 9:27 AM     TASK REPLIED TO: Previously Assigned To [facility] [facility],Team  Spoke with pt and advised of the same,verbalizes understanding  DR Ameena Watson referral mailed to pt per his request         Signatures   Electronically signed by :  Brittnee Chen, ; Apr 12 2016  9:28AM EST                       (Author)

## 2018-01-15 NOTE — RESULT NOTES
Verified Results  CARDIAC CATHETERIZATION [id number] 06:31AM Joan Almaraz     Test Name Result Flag Reference   CARDIAC CATHETERIZATION (Report)     [address]   [phone]     [facility]     Invasive Cardiovascular Lab Complete Report     Patient: Kevin Clark   MR number: [id number]   Account number: [de-identified]   Study date: 2016   Gender: Male   : 1960   Height: 66 1 in   Weight: 139 7 lb   BSA: 1 72 m squared     Allergies: OTHER     Diagnostic Cardiologist: Bia Packer MD   Primary Physician: DO ENRIQUE Wooten     CORONARY CIRCULATION:   Left main: The vessel was normal sized  Angiography showed no evidence of   disease  LAD: The vessel was normal sized  Angiography showed minor luminal   irregularities  Mid LAD: There was a 20 % stenosis  Circumflex: The vessel was normal sized  Angiography showed minor luminal   irregularities  Mid circumflex: There was a 20 % stenosis  RCA: The vessel was medium sized  Angiography showed minor luminal   irregularities  Mid RCA: There was a 0 % stenosis at the site of a prior stent  CARDIAC STRUCTURES:   There was mild aortic stenosis  There was mild-moderate aortic insufficiency  AORTA:   The root exhibited normal size  Aortic arch: Normal      INDICATIONS:   -- Possible CAD: myocardial infarction with ST elevation (STEMI)  ST elevation   began 2016 00:00  PROCEDURES PERFORMED     -- Left heart catheterization with ventriculography  -- Left coronary angiography  -- Right coronary angiography  -- Acute Myocardial Infarct  -- Coronary Catheterization (w/ LHC)  PROCEDURE: The risks and alternatives of the procedures and conscious sedation   were explained to the patient and informed consent was obtained  The patient   was brought to the cath lab and placed on the table   The planned puncture sites   were prepped and draped in the usual sterile fashion  -- Right femoral artery access  The puncture site was infiltrated with local   anesthetic  The vessel was accessed using the modified Seldinger technique, a   wire was advanced into the vessel, and a sheath was advanced over the wire into   the vessel  -- Left heart catheterization with ventriculography  A catheter was advanced   over a guidewire into the ascending aorta  After recording ascending aortic   pressure, the catheter was advanced across the aortic valve and left   ventricular pressure was recorded  Ventriculography was performed  The catheter   was pulled back across the aortic valve and into the ascending aorta and   pullback pressures were obtained  -- Left coronary artery angiography  A catheter was advanced over a guidewire   into the aorta and positioned in the left coronary artery ostium under   fluoroscopic guidance  Angiography was performed  -- Right coronary artery angiography  A catheter was advanced over a guidewire   into the aorta and positioned in the right coronary artery ostium under   fluoroscopic guidance  Angiography was performed  -- Acute Myocardial Infarct  -- Coronary Catheterization (w/ LHC)  PROCEDURE COMPLETION: The patient tolerated the procedure well and was   discharged from the cath lab  TIMING: Test started at 06:37  Test concluded at   06:58  HEMOSTASIS: The sheath was removed over a wire and the Angioseal   delivery sheath was inserted into the femoral artery  Hemostasis was obtained   using a closure device ( Angioseal) deployed through the delivery sheath  MEDICATIONS GIVEN: 1% Lidocaine, 1 ml, subcutaneously, at 06:43  Fentanyl   (1OOmcg/2 ml), 50 mcg, IV, at 06:44  Versed (2mg/2ml), 2 mg, IV, at 06:45  Angiomax Bolus(250mg/50ml), 10 ml, IV, at 06:48  CONTRAST GIVEN: 120 ml   Omnipaque (350 mg I /ml)  RADIATION EXPOSURE: Fluoroscopy time: 2 18 min       HEMODYNAMICS: Hemodynamic assessment demonstrated mild systemic hypertension   and normal LVEDP  VALVES:   AORTIC VALVE:  -- There was mild aortic stenosis  There was mild-moderate   aortic insufficiency  CORONARY VESSELS:  -- The coronary circulation is right dominant  -- Left main: The vessel was normal sized  Angiography showed no evidence of   disease  -- LAD: The vessel was normal sized  Angiography showed minor luminal   irregularities  -- Mid LAD: There was a 20 % stenosis  -- Circumflex: The vessel was normal sized  Angiography showed minor luminal   irregularities  -- Mid circumflex: There was a 20 % stenosis  -- RCA: The vessel was medium sized  Angiography showed minor luminal   irregularities  -- Mid RCA: There was a 0 % stenosis at the site of a prior stent  AORTA:  -- The root exhibited normal size  -- Aortic arch: Normal      RIGHT LOWER EXTREMITY VESSELS:  -- Mid right common femoral: The vessel was   normal-sized  Angiography showed moderate atherosclerosis  Prepared and signed by     Kiara uHizar MD   Signed 2016 08:12:13     Study diagram     Angiographic findings   Native coronary lesions:   ·Mid LAD: Lesion 1: 20 % stenosis  ·Mid circumflex: Lesion 1: 20 % stenosis  ·Mid RCA: Lesion 1: 0 % stenosis, site of prior stent       Hemodynamic tables     Pressures: Baseline   Pressures: - HR: 119   Pressures: - Rhythm:   Pressures: -- Aortic Pressure (S/D/M): 91/51/72   Pressures: -- Left Ventricle (s/edp): 107/19/--     Outputs: Baseline   Outputs: -- CALCULATIONS[age]   Outputs: -- CALCULATIONS: Body Surface Area: 1 72   Outputs: -- CALCULATIONS: Height in cm: 168 00   Outputs: -- CALCULATIONS: Sex: Male   Outputs: -- CALCULATIONS: Weight in k 50

## 2018-01-15 NOTE — RESULT NOTES
Message  Discussed results of the cervical and brain MRI  Patient denies any changes in symptoms  He has appointment with Neurology scheduled  Will try to see if he can be seen earlier  Discussed surgical consult for stenosis, but patient will prefer to wait until neurology consult si completed  Verified Results  * MRI BRAIN W WO CONTRAST 01Apr2016 09:34AM Sherree Rubinstein     Test Name Result Flag Reference   MRI BRAIN W WO CONTRAST (Report)     This is a summary report  The complete report is available in the patient's medical record  If you cannot access the medical record, please contact the sending organization for a detailed fax or copy  MRI BRAIN WITH AND WITHOUT CONTRAST     INDICATION: Concern for cervical spine hemosiderosis, evaluate for cerebral source  COMPARISON: Cervical spine MRI dated Nov 29, 2015  TECHNIQUE:   Sagittal T1, axial T2, axial FLAIR, axial T1, axial Gradient, axial diffusion  Sagittal, axial and coronal T1 postcontrast  Axial BRAVO post contrast     Gadavist was injected intravenously without immediate consequence  Amount not documented     IMAGE QUALITY:  Diagnostic  FINDINGS:     BRAIN PARENCHYMA: Mild scattered subcortical and periventricular white matter T2 hyperintensities  VENTRICLES: Normal      SELLA AND PITUITARY GLAND: Normal      ORBITS: Normal      PARANASAL SINUSES: Normal      VASCULATURE: Evaluation of the major intracranial vasculature demonstrates appropriate flow voids  CALVARIUM AND SKULL BASE: Normal      EXTRACRANIAL SOFT TISSUES: Normal        IMPRESSION:   1  Nons-specific mild scattered subcortical and periventricular white matter T2 hyperintensities that may represent microangiopathic disease, demyelinating disease, or an infectious/inflammatory process  Clinical correlation is recommended         Workstation performed: [id number]     Signed by:   Blanche Sanders MD   4/1/16     * MRI CERVICAL SPINE W WO CONTRAST 01Apr2016 09: 33AM [facility]     Test Name Result Flag Reference   MRI CERVICAL SPINE W [address] (Report)     This is a summary report  The complete report is available in the patient's medical record  If you cannot access the medical record, please contact the sending organization for a detailed fax or copy  MRI CERVICAL SPINE WITH AND WITHOUT CONTRAST     INDICATION: Neck and shoulder pain, previous MR with abnormality     COMPARISON: Jabari Ruiz 11/29/2015     TECHNIQUE: Sagittal T1, sagittal T2, sagittal inversion recovery, axial 2D merge and axial T2  Sagittal T1 and axial T1 postcontrast     7 mL of Gadavist was administered without immediate consequence  IMAGE QUALITY: Diagnostic  FINDINGS:     ALIGNMENT: Straightening of normal cervical lordosis with multilevel mid and low cervical spondylosis and osteoarthritis  MARROW SIGNAL: Chronic reactive marrow changes  CERVICAL AND VISUALIZED UPPER THORACIC CORD: Cervical cord signal normal  The areas of signal dropout on previous studies sagittal STIR sequence are not evident on today's exam, these are thought to be artifactual  There may be minor cord    edema/gliosis at the C4-C5 level, site of maximum canal stenosis  Russell Balint No pathologic enhancement  PREVERTEBRAL AND PARASPINAL SOFT TISSUES: No pathology     VISUALIZED POSTERIOR FOSSA: The visualized posterior fossa demonstrates no abnormal signal      CERVICAL DISC SPACES:        C2-C3: Minor bilateral facet arthrosis     C3-C4: Slight reduction disc height, minor circumferential bulge  Asymmetric left uncinate and facet arthrosis, correlate for left C4 radiculitis  C4-C5: Decreased disc height, marginal osteophytes  Redundancy ligamenta flava narrowing of the canal to estimated 5 mm  There may be minimal high signal gliosis within the cord at this level  C5-C6: Left uncinate and facet arthrosis, moderate left foraminal stenosis  Correlate for left C6 radiculitis   AP dimension of the canal diminished to estimated 9 mm  C6-C7: Minor uncinate and facet arthrosis mild bilateral foraminal stenosis  C7-T1: Left posterior anterolateral disc bulge  Marked left facet arthrosis since foraminal stenosis, correlate for left C8 radiculitis  UPPER THORACIC DISC SPACES: Normal      POSTCONTRAST IMAGING: Normal        IMPRESSION:     No convincing evidence for hemosiderin within the cord  There is mild cord compression at the C4-C5 level mild cord edema/gliosis may be noted at the disc space  Canal reduced to approximately 5 mm  Potentially significant foraminal stenosis to the left, at C3-4, C5-C6 and C7-T1  Correlate for left C4, C6 and C8 radiculitis respectively  Workstation performed: [id number]     Signed by: Liana Gutierrez MD   4/1/16       Results/Data  Results    * MRI CERVICAL SPINE W WO CONTRAST   MRI CERVICAL SPINE W WO CONTRAST: This is a summary report  The complete  report is available in the patient's medical record  If you cannot access the medical  record, please contact the sending organization for a detailed fax or copy  MRI CERVICAL SPINE WITH AND WITHOUT CONTRAST     INDICATION:  Neck and shoulder pain, previous MR with abnormality     COMPARISON:  Maddie Farrell 11/29/2015     TECHNIQUE:  Sagittal T1, sagittal T2, sagittal inversion recovery, axial 2D merge and  axial T2  Sagittal T1 and axial T1 postcontrast      7 mL of Gadavist was administered without immediate consequence  IMAGE QUALITY:  Diagnostic  FINDINGS:     ALIGNMENT:  Straightening of normal cervical lordosis with multilevel mid and low  cervical spondylosis and osteoarthritis  MARROW SIGNAL:  Chronic reactive marrow changes       CERVICAL AND VISUALIZED UPPER THORACIC CORD:  Cervical cord signal normal    The areas of signal dropout on previous studies sagittal STIR sequence are not  evident on today's exam, these are thought to be artifactual   There may be minor  cord edema/gliosis at the C4-C5 level, site of maximum canal stenosis  Russell Balint No pathologic  enhancement  PREVERTEBRAL AND PARASPINAL SOFT TISSUES:  No pathology     VISUALIZED POSTERIOR FOSSA:  The visualized posterior fossa demonstrates no  abnormal signal      CERVICAL DISC SPACES:          C2-C3:  Minor bilateral facet arthrosis     C3-C4:  Slight reduction disc height, minor circumferential bulge  Asymmetric left  uncinate and facet arthrosis, correlate for left C4 radiculitis  C4-C5:  Decreased disc height, marginal osteophytes  Redundancy ligamenta flava  narrowing of the canal to estimated 5 mm  There may be minimal high  signal gliosis within the cord at this level  C5-C6:  Left uncinate and facet arthrosis, moderate left foraminal stenosis  Correlate for  left C6 radiculitis  AP dimension of the canal diminished to estimated 9 mm  C6-C7:  Minor uncinate and facet arthrosis mild bilateral foraminal stenosis  C7-T1:  Left posterior anterolateral disc bulge  Marked left facet arthrosis since  foraminal stenosis, correlate for left C8 radiculitis  UPPER THORACIC DISC SPACES:  Normal      POSTCONTRAST IMAGING:  Normal        IMPRESSION:     No convincing evidence for hemosiderin within the cord  There is mild cord compression at the C4-C5 level mild cord edema/gliosis may be  noted at the disc space  Canal reduced to approximately 5 mm  Potentially significant foraminal stenosis to the left, at C3-4, C5-C6 and C7-T1  Correlate  for left C4, C6 and C8 radiculitis respectively  Workstation performed: [id number]     Signed by:    Zo Flaherty MD   4/1/16     Signatures   Electronically signed by : Nik Perrin MD; Apr 4 2016  5:09PM EST                       (Author)

## 2018-01-15 NOTE — MISCELLANEOUS
Message   Recorded as Task   Date: 02/29/2016 09:53 AM, Created By: Jay Moody   Task Name: Med Renewal Request   Assigned To: [facility]   Regarding Patient: Jihan Matta, Status: Active   [facility] 29 Feb 2016 9:53 AM     TASK CREATED  Caller: Self; Renew Medication; [phone] (Home); [phone] x,,,,, (Work)  Received VM from pt  on [place] triage line from 932 am  Pt  states that he needs a rx refill of Gabapentin 300 mg, TID  Pt  states that he has about 4-5 pills left  Pt  would like medication sent to [facility] pharmacy on [facility]  Pt  requesting c/b at [phone]  Brittnee Chen - 29 Feb 2016 10:07 AM     TASK REPLIED TO: Previously Assigned To [employer] with pt who states he is taking gabapentin 300mg tid,sometimes qid  Denies any s/e's  F/U with you 2-17-16  Please advise  Christina Hooper - 29 Feb 2016 12:09 PM     TASK REPLIED TO: Previously Assigned To Rafiq Dominguez  Refilled to [facility]   Nereyda Murrell - 29 Feb 2016 12:52 PM     TASK EDITED  S/w pt  and he is aware  Active Problems    1  Abnormal MRI, cervical spine (793 7) (R93 7)   2  Asthma (493 90) (J45 909)   3  Cervical radiculopathy (723 4) (M54 12)   4  Cervicalgia (723 1) (M54 2)   5  Chronic obstructive pulmonary disease (496) (J44 9)   6  Dyslipidemia (272 4) (E78 5)   7  Hypertension (401 9) (I10)   8  Instability of shoulder joint, 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)   9  Left shoulder pain (719 41) (M25 512)   10  Need for immunization against influenza (V04 81) (Z23)   11  Presence of stent in coronary artery in patient with coronary artery disease    (414 01,V45 82) (I25 10,Z95 5)   12  Subacromial impingement of left shoulder (726 19) (M75 42)    Current Meds   1  Albuterol Sulfate (2 5 MG/3ML) 0 083% Inhalation Nebulization Solution; USE 1 UNIT   DOSE EVERY 4-6 HOURS AS NEEDED FOR WHEEZING ; Therapy: 69PLL2068 to (Last Acquanetta Toni)  Requested for: 18Wxs7185 Ordered   2  Atorvastatin Calcium 40 MG Oral Tablet; TAKE 1 TABLET DAILY AT BEDTIME; Therapy: 29VOK8044 to (Evaluate:54Vly8337); Last Rx:22Iqn2466 Ordered   3  Gabapentin 300 MG Oral Capsule; TAKE 1 CAPSULE 3 TIMES DAILY; Therapy: 44YLN4761 to (Verneice Na)  Requested for: 97BIM5518; Last   Rx:64Mjl4411 Ordered   4  Lisinopril 2 5 MG Oral Tablet; Therapy: 46QLK7192 to (Evaluate:13Fjm3673) Recorded   5  Spiriva Respimat 2 5 MCG/ACT Inhalation Aerosol Solution; inhale 2 puffs once daily; Therapy: 71ZNE3352 to (Evaluate:11Mar2016)  Requested for: 10DAY9641; Last   Rx:11Jan2016 Ordered   6  Symbicort 80-4 5 MCG/ACT Inhalation Aerosol; INHALE 2 PUFFS TWICE DAILY  RINSE   MOUTH AFTER USE; Therapy: 62OLI8360 to (Last Janece Cure)  Requested for: 51NSL0553 Ordered   7  Ventolin  (90 Base) MCG/ACT Inhalation Aerosol Solution; inhale 1 to 2 puffs   every 4 to 6 hours if needed; Therapy: 00YTV6194 to (Zhane Asa)  Requested for: 55LBW0203; Last   Rx:83Cfb4460 Ordered    Allergies    1   No Known Drug Allergies    Signatures   Electronically signed by : Kamla Acosta, ; Feb 29 2016 12:53PM EST                       (Author)

## 2018-01-15 NOTE — MISCELLANEOUS
Message  GI Reminder Recall [address] [place] [address] [address]:   Date: 11/28/2016   Dear Lalo CEDENO:     Review of our records shows you are due for the following: Follow Up Visit  Please call the following office to schedule your appointment:   [address], [address], [address] [facility] [phone]  We look forward to hearing from you!      Sincerely,     [facility]      Signatures   Electronically signed by : Paula Muñoz, ; Nov 28 2016 10:27AM EST                       (Author)

## 2018-01-16 NOTE — MISCELLANEOUS
Message   Recorded as Task   Date: 02/17/2016 09:51 AM, Created By: Lorelei Bobo   Task Name: Medical Complaint Callback   Assigned To: [facility]   Regarding Patient: Phill Snyder, Status: In Progress   Comment:    Katlyn Mayen - 17 Feb 2016 9:51 AM     TASK CREATED  Caller: Self; Medical Complaint; [phone] (Home)  Pt left  2/17 at 9:27 that he saw Dr Krystal Shen a month ago for his neck and shoulder pain  The pain had stopped for awhile, she had given me nerve deadening pills  Now the pain is back, can I get an appt, Rx for more of those pills or a shot? Requested c/b at [phone]  S/W pt said the neck and left shoulder pain had gotten better but started to return about a week ago  Pt said he was taking the nerve pills, got up to 3 at [facility] and they helped somewhat and he had no s/e from them  Pt said he hasn't taken them for over a month  I advised pt that the gabapentin is a type of medication that is not to be stopped abruptly  Pt said Dr Krystal Shen had referred him to Dr Ted Perez, they can't see him until May, but they have him on a waiting list     I scheduled pt an ov for 2/23/16 at 10:15 to be re-eval   Pt asking if RX for the nerve pills can be [facility] until seen next wk? Told pt I would d/w Dr Krystal Shen and c/b with her recommendations  Pt last seen 11/3/15, gabapentin 100mg take 1 cap @[facility], may inc to 3 caps QHS LP 11/3/15  Rafiq Dominguez - 17 Feb 2016 1:43 PM     TASK REPLIED TO: Previously Assigned To [facility]  I have ordered gabapentin 300 mg Qhs instead of 100 mg tabs  We can also schedule for f/u ov  I would like to discuss ordering other imagining as mentioned in his last MRI  Brittnee Chen - 17 Feb 2016 2:06 PM     TASK REPLIED TO: Previously Assigned To [facility]  VM left on home/cell phone to      Brittnee Chen - 17 Feb 2016 2:06 PM     TASK IN PROGRESS   Katlyn Mayen - 17 Feb 2016 3:32 PM TASK EDITED  Pt advised that Dr Amparo Knox sent a RX for gabapentin 300mg QHS to his pharmacy, he was advised not to stop abruptly without contacting the office first     I explained to pt that this is a change from the dosage she had given him before, lprevious dosage was 100mg, take 3 tab QHS, this is a 300mg tab so he's to take it as prescribed QHS  Pt advised to keep his ov for 2/23/16 to discuss ordering other diagnostic tests  Pt said sure as long as the ins co approves them, told pt that would be discuss at ov  Active Problems    1  Abnormal MRI, cervical spine (793 7) (R93 7)   2  Asthma (493 90) (J45 909)   3  Cervical radiculopathy (723 4) (M54 12)   4  Cervicalgia (723 1) (M54 2)   5  Chronic obstructive pulmonary disease (496) (J44 9)   6  Dyslipidemia (272 4) (E78 5)   7  Hypertension (401 9) (I10)   8  Instability of shoulder joint, 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)   9  Left shoulder pain (719 41) (M25 512)   10  Need for immunization against influenza (V04 81) (Z23)   11  Presence of stent in coronary artery in patient with coronary artery disease    (414 01,V45 82) (I25 10,Z95 5)   12  Subacromial impingement of left shoulder (726 19) (M75 42)    Current Meds   1  Albuterol Sulfate (2 5 MG/3ML) 0 083% Inhalation Nebulization Solution; USE 1 UNIT   DOSE EVERY 4-6 HOURS AS NEEDED FOR WHEEZING ; Therapy: 37RBS1580 to (Last Katt Smiling)  Requested for: 65Puf7443 Ordered   2  Atorvastatin Calcium 40 MG Oral Tablet; TAKE 1 TABLET DAILY AT BEDTIME; Therapy: 89KDC9940 to (Evaluate:35Dxb4294); Last Rx:63Uhq0127 Ordered   3  Gabapentin 300 MG Oral Capsule; TAKE 1 CAPSULE AT BEDTIME; Therapy: 45DRA9462 to (Evaluate:18Mar2016)  Requested for: 82RSQ0222; Last   Rx:29Ukh9088 Ordered   4  Lisinopril 2 5 MG Oral Tablet; Therapy: 39EXS6792 to (Evaluate:39Nen4581) Recorded   5  Spiriva Respimat 2 5 MCG/ACT Inhalation Aerosol Solution; inhale 2 puffs once daily;    Therapy: 08UZE2228 to (Evaluate:11Mar2016) Requested for: 68ZMA4885; Last   Rx:11Jan2016 Ordered   6  Symbicort 80-4 5 MCG/ACT Inhalation Aerosol; INHALE 2 PUFFS TWICE DAILY  RINSE   MOUTH AFTER USE; Therapy: 43DSL2498 to (Ricky Estrada)  Requested for: 37YUL5135 Ordered   7  Ventolin  (90 Base) MCG/ACT Inhalation Aerosol Solution; inhale 1 to 2 puffs   every 4 to 6 hours if needed; Therapy: 75ESA5348 to (Claritza Goldberg)  Requested for: 51HAT2920; Last   Rx:11Ccy3621 Ordered    Allergies    1   No Known Drug Allergies    Signatures   Electronically signed by : Noa Laurent, ; Feb 17 2016  3:32PM EST                       (Author)

## 2018-01-16 NOTE — PROGRESS NOTES
History of Present Illness  Care Coordination Encounter Information:   Type of Encounter: Telephonic    Spoke to Patient   Voicemail left for follow up, await a call back  Care Coordination  Nurse [address] [place] [address] [address]:   The reason for call is to discuss outreach for follow up/needed services  Follow up hospital discharge 12/8 and cardiology office visit 12/23  Active Problems    1  Abdominal pain (789 00) (R10 9)   2  Abnormal MRI, cervical spine (793 7) (R93 7)   3  Acute pericarditis (420 90) (I30 9)   4  Arm paresthesia, left (782 0) (R20 2)   5  Asthma (493 90) (J45 909)   6  Atherosclerosis of coronary artery (414 00) (I25 10)   7  Cervical herniated disc (722 0) (M50 20)   8  Cervical pain (neck) (723 1) (M54 2)   9  Cervical radiculopathy (723 4) (M54 12)   10  Cervical spinal stenosis (723 0) (M48 02)   11  Cervical spinal stenosis (723 0) (M48 02)   12  Cervicalgia (723 1) (M54 2)   13  Chronic obstructive pulmonary disease (496) (J44 9)   14  Colon cancer screening (V76 51) (Z12 11)   15  Diarrhea (787 91) (R19 7)   16  Dyslipidemia (272 4) (E78 5)   17  History of right hemicolectomy (V45 89) (Z90 49)   18  Hypertension (401 9) (I10)   19  Instability of shoulder joint, 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)   20  Left shoulder pain (719 41) (M25 512)   21  Myelopathy, spondylogenic, 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)   22  Need for immunization against influenza (V04 81) (Z23)   23  Oral candidiasis (112 0) (B37 0)   24  Presence of stent in coronary artery in patient with coronary artery disease    (414 01,V45 82) (I25 10,Z95 5)   25  Scoliosis (737 30) (M41 9)   26  Subacromial impingement of left shoulder (726 19) (M75 42)   27  Tachycardia (785 0) (R00 0)    Past Medical History    1  History of Anxiety (300 00) (F41 9)   2  Asthma (493 90) (J45 909)   3  History of Granular Cell Carcinoma (199 1)   4  History of esophageal reflux (V12 79) (Z87 19)   5  History of gastritis (V12 79) (Z87 19)   6   Old myocardial infarction (412) (I25 2)   7  History of Pneumonia (V12 61)   8  History of Type 2 diabetes, diet controlled (250 00) (E11 9)    Surgical History    1  History of Cardiac Cath Procedure Summary   2  History of Colon Surgery   3  History of Right Hemicolectomy   4  History of Tonsillectomy    Family History  Father    1  Family history of Coronary Artery Disease (V17 49)   2  Family history of Crohn's Disease   3  Family history of Stroke Syndrome (V17 1)  Unknown    4  Family history of diabetes mellitus (V18 0) (Z83 3)    Social History    · Alcohol Use (History)   · Cigars (3  A Day) (305 1)   · Current every day smoker (305 1) (F17 200)   · Drinks beer (V49 89) (Z78 9)   · Marital History -    · Tobacco use (305 1) (Z72 0)    Current Meds    1  Colchicine 0 6 MG Oral Tablet; 1 tab bid; Therapy: 66Ngy0372 to (Evaluate:22Apr2017)  Requested for: 15Dsm4631; Last   Rx:08Dje3109 Ordered    2  Dulera 200-5 MCG/ACT Inhalation Aerosol; INHALE 2 PUFFS AT 12 HOUR INTERVALS   (MORNING AND EVENING); Therapy: 24AXJ0768 to (Last Tamie Dawn)  Requested for: 88UKN6994 Ordered    3  Aspirin 81 MG Oral Tablet Delayed Release; Take one tablet once daily; Therapy: 44FWG6046 to (Evaluate:02Jan2017)  Requested for: 38OQJ2287; Last   Rx:06Jun2016 Ordered   4  Metoprolol Tartrate 25 MG Oral Tablet; TAKE 0 5 TABLET Every twelve hours; Therapy: 83MFW7689 to (Evaluate:22Apr2017)  Requested for: 83Cxg6849; Last   Rx:10Zuh7203 Ordered    5  Gabapentin 300 MG Oral Capsule; TAKE 1 CAPSULE 3 TIMES DAILY; Therapy: 70GED9150 to (Evaluate:01Jun2017)  Requested for: 20UQR2500; Last   UZ:23TAQ1051 Ordered    6  Albuterol Sulfate (2 5 MG/3ML) 0 083% Inhalation Nebulization Solution; USE 1 UNIT   DOSE EVERY 4-6 HOURS AS NEEDED FOR WHEEZING ; Therapy: 91IZX0756 to (Last Rx:06Jun2016)  Requested for: 06Jun2016 Ordered   7  Spiriva Respimat 2 5 MCG/ACT Inhalation Aerosol Solution; inhale 2 puffs once daily;    Therapy: 01Jtd1292 to (Flor Aldana)  Requested for: 22MEH4621; Last   Rx:13Mar2016 Ordered   8  Ventolin  (90 Base) MCG/ACT Inhalation Aerosol Solution; inhale 1 to 2 puffs   every 4 to 6 hours if needed; Therapy: 96JFF4691 to (Forrest Luna)  Requested for: 25HHX1399; Last   Rx:29Nov2016 Ordered    9  Aspirin  MG Oral Tablet Delayed Release; TAKE 1 TABLET BY MOUTH ONCE   DAILY; Therapy: 91Ytd3504 to (Evaluate:22Apr2017)  Requested for: 46Xys2746; Last   Rx:98Azd9223 Ordered   10  Lisinopril 2 5 MG Oral Tablet; TAKE 1 TABLET DAILY; Therapy: 32VVF8536 to Flor Aldana)  Requested for: 67LPS4352; Last    Rx:08Mar2016 Ordered    Allergies    1  No Known Drug Allergies    End of Encounter Meds    1  Colchicine 0 6 MG Oral Tablet; 1 tab bid; Therapy: 33Fnx2542 to (Evaluate:22Apr2017)  Requested for: 10Rfs9076; Last   Rx:30Tjv6985 Ordered    2  Dulera 200-5 MCG/ACT Inhalation Aerosol; INHALE 2 PUFFS AT 12 HOUR INTERVALS   (MORNING AND EVENING); Therapy: 87BGA8198 to (Last Renella Cons)  Requested for: 65VWT1636 Ordered    3  Aspirin 81 MG Oral Tablet Delayed Release; Take one tablet once daily; Therapy: 28AZT5354 to (Evaluate:02Jan2017)  Requested for: 70NIV8198; Last   Rx:06Jun2016 Ordered   4  Metoprolol Tartrate 25 MG Oral Tablet; TAKE 0 5 TABLET Every twelve hours; Therapy: 70HID2220 to (Evaluate:22Apr2017)  Requested for: 05Njc3577; Last   Rx:77Jui7966 Ordered    5  Gabapentin 300 MG Oral Capsule; TAKE 1 CAPSULE 3 TIMES DAILY; Therapy: 05NUC2860 to (Evaluate:01Jun2017)  Requested for: 72ILR4331; Last   KF:82JWK7630 Ordered    6  Albuterol Sulfate (2 5 MG/3ML) 0 083% Inhalation Nebulization Solution; USE 1 UNIT   DOSE EVERY 4-6 HOURS AS NEEDED FOR WHEEZING ; Therapy: 22ZYJ8590 to (Last Rx:06Jun2016)  Requested for: 30Van9263 Ordered   7  Spiriva Respimat 2 5 MCG/ACT Inhalation Aerosol Solution; inhale 2 puffs once daily;    Therapy: 05COH0764 to (Evaluate:08Mar2017)  Requested for: 88IWK6897; Last   Rx:13Mar2016 Ordered   8  Ventolin  (90 Base) MCG/ACT Inhalation Aerosol Solution; inhale 1 to 2 puffs   every 4 to 6 hours if needed; Therapy: 59AUU9303 to (Armond Erwin)  Requested for: 44IOV9971; Last   Rx:29Nov2016 Ordered    9  Aspirin  MG Oral Tablet Delayed Release; TAKE 1 TABLET BY MOUTH ONCE   DAILY; Therapy: 52Mdz4706 to (Evaluate:78Pue0398)  Requested for: 53Bqq0768; Last   Rx:57Nio1116 Ordered   10  Lisinopril 2 5 MG Oral Tablet; TAKE 1 TABLET DAILY; Therapy: 96QAV4078 to [phone])  Requested for: 08VIB4331; Last    Rx:08Mar2016 Ordered    Future Appointments    Date/Time Provider Specialty Site   01/12/2017 09:40 AM ARGENIS Aviles  Cardiology [facility] CARDIOLOGY [place]   03/16/2017 01:10 [facility] [address] [place] [address] PCP     Patient Care Team    Care Team Member Role Specialty Office Number   Amrit Borja   [phone]   Karlos Moya MD Referring Pain Management [phone]   Sung IVORY   Specialist Neurosurgery [phone]   Nora Ramirez MD Specialist Colon and Rectal Surgery [phone]   Zachary Murphy [address] [place] [address]  Cardiology [phone]   Electronically signed by : Briant Goldberg, ; Dec 27 2016 11:25AM EST                       (Author)

## 2018-01-16 NOTE — PROCEDURES
Procedures signed  by Santo Kaplan DO at 4/26/2016 11:14 AM       Author:  Santo Kaplan DO Service:  (none) Author Type:  Physician     Filed:  4/26/2016 11:14 AM Date of Service:  4/25/2016 12:14 PM Status:  Signed     :  Santo Kaplan DO (Physician)            Fabiano Dawson  [id number]  NEUROLOGY REPORT  04/25/2016    Referring Physician  Rafiq Dominguez DO     History Of Present Illness  This is a 65-year-old, right-hand dominant male with marked left upper   extremity pain, weakness and numbness which is global  No bowel or   bladder incontinence  No history of any trauma  Thus far he has had no   interventions of the C-spine  Imaging shows marked cervical stenosis   at C4-C5 and also multilevel foraminal stenosis  He is being seen   today for electrodiagnostic study of the left upper extremity  Neurosurgical consultation been ordered  Past Medical/surgical History  Chronic obstructive pulmonary disease, coronary artery disease,   hypertension  No history of diabetes  Physical Examination  He has a positive Howard sign bilaterally 1 to 2 beats of clonus at   the ankles  All reflexes are brisk  He has globally diminished   sensation to pin in the left upper extremity, but nothing focal to any   particular peripheral nerve  On inspection, he has atrophy of the   deltoid and spinate muscles  Electrodiagnostic Findings    Electroneurography: Nerve conduction studies were entirely normal for   the left ulnar nerve including motor and sensory fibers  The left median sensory latency was slightly prolonged across the   carpal ligament segment at 2 2 ms  The left median motor latency was   slightly prolonged at 4 4 ms  Amplitudes and proximal conductions were   normal for these nerves        Electromyography: Monopolar needle exploration revealed 1+ abnormal   spontaneous potentials in the form of positive waves in the left   abductor pollicis brevis with reduced motor unit recruitment  There   was 2+ abnormal fibrillations and 3+ positive waves in the left   deltoid, 1+ positive waves in the left first dorsal interosseous and   2+ positive waves in the left infraspinatus  In all these muscles   motor unit recruitment was reduced  No abnormalities are found in the left brachioradialis, left biceps,   left triceps, left trapezius  In the left biceps there is marked   amount of polyphasic potentials noted firing at increased rates  A somewhat limited study due to discomfort of the cervical paraspinals   revealed no spontaneous activity  Impression  1  There is evidence for a  multilevel radicular process, 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, primarily involving the C6 roots and the C8 roots  2  Clinically, he has upper motor neuron signs consistent with   cervical myelopathy  3  Incidental mild median mononeuropathy at the left wrist, which does   not explain his LUE proximal muscle atrophy and weakness and sensory   disturbance in a global pattern  Recommendation  Careful clinical correlation is advised    [id number]  D:  04/25/2016 11:01:39  Dictated by:  Ashley Zarate DO,   Diplomate, [address] [place] [address] Board of Electrodiagnostic Medicine    T:  04/25/2016 12:14:06    CC:    Rafiq Orourke DO             Received for:Wil Zarate DO  Apr 26 2016 11:15AM [facility] Standard [facility]

## 2018-01-16 NOTE — PROCEDURES
Procedures by Dioni Artis MD at 12/7/2016   7:20 AM      Author:  Dioni Artis MD Service:  Cardiology Author Type:  Fellow     Filed:  12/7/2016  7:25 AM Date of Service:  12/7/2016  7:20 AM Status:  Signed     :  Dioni Artis MD (Fellow)            Procedures   Procedure: Left heart catheterization    Attending: Sagar Paredes MD    Fellow: Dioni Artis MD    Indication: Inferior STEMI    Findings: Right dominant circulation    Left main: Normal-sized, no evidence of significant disease  LAD: Normal-sized, minor luminal irregularities with no evidence of obstructive disease  LCX: Normal-sized, minor luminal irregularities with no evidence of obstructive disease  RCA: Normal size, dominant, no evidence of obstructive lesions  Prior stent in mid vessel is patent  LV: Normal ejection fraction, no regional wall motion abnormality (LVEDP: 10 mmHg)  Aortogram: No evidence of dissecion  Aortic valve had a peak to peak gradient of 10 mmhg with mild to moderate AI  Access: Right femoral access, hemostasis achieved Angio-Seal     Complications: None    Impression: no evidence of significant epicardial coronary artery disease  Plan: There are no culprit lesions in the epicardial vessels to explain patients symptoms of chest pain and ecg abnormalities  A trans thoracic echocardiogram will be performed  Patient will be monitored for arrhythmias  CTA is done an is negative for  aortic dissection  Blood cultures will be performed  The findings of the catheterization were discussed with the patient                 Received for:Tee Garcia MD  Dec  7 2016  8:50AM [facility] Standard Time

## 2018-01-16 NOTE — RESULT NOTES
Message   Recorded as Task   Date: 05/12/2016 11:35 AM, Created By: [facility]   Task Name: Follow Up   Assigned To: [facility]   Regarding Patient: Taz Chávez, Status: Active   Comment:    Nereyda Murrell - 12 May 2016 11:35 AM     TASK CREATED  Caller: Self; Other; [phone] (Home); [phone] x,,,,, (Work)  S/w pt  after receiving mlom  Pt is going to cancel his appt  on 5/13 because he has an appt  c/ DR Lola Posada for 5/16  He will f/u after his consult c/ Rafiq Dent - 12 May 2016 1:04 PM     TASK REPLIED TO: Previously Assigned To [facility],Team  Thank you          Signatures   Electronically signed by : Kiko Dunlap, ; May 12 2016  1:44PM EST                       (Author)

## 2018-01-18 NOTE — RESULT NOTES
Message   Recorded as Task   Date: 04/08/2016 11:48 AM, Created By: [facility] located [facility]   Task Name: Follow Up   Assigned To: [facility]   Regarding Patient: [place], Status: Active   Comment:    Brittnee Chen - 08 Apr 2016 11:48 AM     TASK CREATED  Caller: Self; Other; [phone] (Home); [phone] x,,,,, (Work)  Pt called today stating he did try to call Dr Terrell Arora office at [facility] request to see if there was a sooner appt  Pt states there are no sooner openings that Dr Pooja Rodriguez would have to discuss with DR Janet Singh  I advised pt that you did send message and that conversation is in place  Rafiq Dominguez - 12 Apr 2016 8:05 AM     TASK REPLIED TO: Previously Assigned To [facility]  Please see other task regarding his MRI  Brittnee Chen - 12 Apr 2016 9:28 AM     TASK REPLIED TO: Previously Assigned To [facility]  Addressed in another task  Signatures   Electronically signed by :  Brittnee Chen, ; Apr 12 2016  9:28AM EST                       (Author)

## 2018-01-25 DIAGNOSIS — M54.12 CERVICAL RADICULOPATHY: Primary | ICD-10-CM

## 2018-01-25 RX ORDER — GABAPENTIN 300 MG/1
CAPSULE ORAL
Qty: 90 CAPSULE | Refills: 10 | Status: SHIPPED | OUTPATIENT
Start: 2018-01-25 | End: 2018-01-26 | Stop reason: SDUPTHER

## 2018-01-26 DIAGNOSIS — M54.12 CERVICAL RADICULOPATHY: ICD-10-CM

## 2018-01-26 RX ORDER — GABAPENTIN 300 MG/1
CAPSULE ORAL
Qty: 90 CAPSULE | Refills: 10 | Status: SHIPPED | OUTPATIENT
Start: 2018-01-26 | End: 2018-02-01 | Stop reason: SDUPTHER

## 2018-02-01 DIAGNOSIS — I10 ESSENTIAL HYPERTENSION: Primary | ICD-10-CM

## 2018-02-01 DIAGNOSIS — M54.12 CERVICAL RADICULOPATHY: ICD-10-CM

## 2018-02-03 PROBLEM — J44.9 COPD (CHRONIC OBSTRUCTIVE PULMONARY DISEASE) (HCC): Status: ACTIVE | Noted: 2017-01-06

## 2018-02-03 RX ORDER — GABAPENTIN 300 MG/1
300 CAPSULE ORAL 3 TIMES DAILY
Qty: 90 CAPSULE | Refills: 0 | Status: ON HOLD | OUTPATIENT
Start: 2018-02-03 | End: 2018-06-01

## 2018-02-03 RX ORDER — LISINOPRIL 2.5 MG/1
2.5 TABLET ORAL DAILY
Qty: 90 TABLET | Refills: 0 | Status: SHIPPED | OUTPATIENT
Start: 2018-02-03 | End: 2018-02-03 | Stop reason: SDUPTHER

## 2018-02-03 RX ORDER — GABAPENTIN 300 MG/1
300 CAPSULE ORAL 3 TIMES DAILY
Qty: 90 CAPSULE | Refills: 3 | Status: SHIPPED | OUTPATIENT
Start: 2018-02-03 | End: 2018-02-03 | Stop reason: SDUPTHER

## 2018-02-03 RX ORDER — LISINOPRIL 2.5 MG/1
2.5 TABLET ORAL DAILY
Qty: 90 TABLET | Refills: 0 | Status: SHIPPED | OUTPATIENT
Start: 2018-02-03 | End: 2018-02-07 | Stop reason: SDUPTHER

## 2018-02-05 ENCOUNTER — ANESTHESIA EVENT (OUTPATIENT)
Dept: GASTROENTEROLOGY | Facility: HOSPITAL | Age: 58
End: 2018-02-05
Payer: COMMERCIAL

## 2018-02-05 DIAGNOSIS — J45.909 ASTHMA, UNSPECIFIED ASTHMA SEVERITY, UNSPECIFIED WHETHER COMPLICATED, UNSPECIFIED WHETHER PERSISTENT: Primary | ICD-10-CM

## 2018-02-06 ENCOUNTER — ANESTHESIA (OUTPATIENT)
Dept: GASTROENTEROLOGY | Facility: HOSPITAL | Age: 58
End: 2018-02-06
Payer: COMMERCIAL

## 2018-02-06 ENCOUNTER — HOSPITAL ENCOUNTER (OUTPATIENT)
Facility: HOSPITAL | Age: 58
Setting detail: OUTPATIENT SURGERY
Discharge: HOME | End: 2018-02-06
Attending: SURGERY | Admitting: SURGERY
Payer: COMMERCIAL

## 2018-02-06 VITALS
SYSTOLIC BLOOD PRESSURE: 117 MMHG | WEIGHT: 145 LBS | OXYGEN SATURATION: 96 % | HEART RATE: 102 BPM | RESPIRATION RATE: 20 BRPM | DIASTOLIC BLOOD PRESSURE: 79 MMHG | TEMPERATURE: 98 F | HEIGHT: 66 IN | BODY MASS INDEX: 23.3 KG/M2

## 2018-02-06 DIAGNOSIS — K50.10 CROHN'S DISEASE OF LARGE INTESTINE WITHOUT COMPLICATION (HCC): ICD-10-CM

## 2018-02-06 DIAGNOSIS — K51.80 OTHER ULCERATIVE COLITIS WITHOUT COMPLICATIONS (HCC): ICD-10-CM

## 2018-02-06 LAB
ATRIAL RATE: 110 BPM
P AXIS: 54 DEGREES
PR INTERVAL: 158 MS
QRS AXIS: 71 DEGREES
QRSD INTERVAL: 102 MS
QT INTERVAL: 368 MS
QTC INTERVAL: 498 MS
T WAVE AXIS: 76 DEGREES
VENTRICULAR RATE: 110 BPM

## 2018-02-06 PROCEDURE — PBSUR PB OPERATIVE NOTE CHARGE PLACEHOLDER: Performed by: SURGERY

## 2018-02-06 PROCEDURE — 93005 ELECTROCARDIOGRAM TRACING: CPT | Performed by: STUDENT IN AN ORGANIZED HEALTH CARE EDUCATION/TRAINING PROGRAM

## 2018-02-06 PROCEDURE — 88305 TISSUE EXAM BY PATHOLOGIST: CPT | Performed by: SURGERY

## 2018-02-06 PROCEDURE — 93010 ELECTROCARDIOGRAM REPORT: CPT | Performed by: INTERNAL MEDICINE

## 2018-02-06 PROCEDURE — 88305 TISSUE EXAM BY PATHOLOGIST: CPT | Performed by: PATHOLOGY

## 2018-02-06 RX ORDER — LISINOPRIL 2.5 MG/1
2.5 TABLET ORAL DAILY
Status: CANCELLED | OUTPATIENT
Start: 2018-02-06

## 2018-02-06 RX ORDER — ALBUTEROL SULFATE 2.5 MG/3ML
2.5 SOLUTION RESPIRATORY (INHALATION) ONCE
Status: CANCELLED | OUTPATIENT
Start: 2018-02-06

## 2018-02-06 RX ORDER — GABAPENTIN 300 MG/1
300 CAPSULE ORAL 3 TIMES DAILY
Status: CANCELLED | OUTPATIENT
Start: 2018-02-06

## 2018-02-06 RX ORDER — PROPOFOL 10 MG/ML
INJECTION, EMULSION INTRAVENOUS CONTINUOUS PRN
Status: DISCONTINUED | OUTPATIENT
Start: 2018-02-06 | End: 2018-02-06 | Stop reason: SURG

## 2018-02-06 RX ORDER — SODIUM CHLORIDE 9 MG/ML
125 INJECTION, SOLUTION INTRAVENOUS CONTINUOUS
Status: DISCONTINUED | OUTPATIENT
Start: 2018-02-06 | End: 2018-02-06 | Stop reason: HOSPADM

## 2018-02-06 RX ORDER — ATORVASTATIN CALCIUM 40 MG/1
40 TABLET, FILM COATED ORAL
Status: CANCELLED | OUTPATIENT
Start: 2018-02-06

## 2018-02-06 RX ORDER — PROPOFOL 10 MG/ML
INJECTION, EMULSION INTRAVENOUS AS NEEDED
Status: DISCONTINUED | OUTPATIENT
Start: 2018-02-06 | End: 2018-02-06 | Stop reason: SURG

## 2018-02-06 RX ADMIN — PROPOFOL 50 MG: 10 INJECTION, EMULSION INTRAVENOUS at 09:23

## 2018-02-06 RX ADMIN — PROPOFOL 100 MG: 10 INJECTION, EMULSION INTRAVENOUS at 09:22

## 2018-02-06 RX ADMIN — SODIUM CHLORIDE 125 ML/HR: 0.9 INJECTION, SOLUTION INTRAVENOUS at 07:59

## 2018-02-06 RX ADMIN — SODIUM CHLORIDE: 0.9 INJECTION, SOLUTION INTRAVENOUS at 09:18

## 2018-02-06 RX ADMIN — PROPOFOL 100 MCG/KG/MIN: 10 INJECTION, EMULSION INTRAVENOUS at 09:22

## 2018-02-06 RX ADMIN — PROPOFOL 50 MG: 10 INJECTION, EMULSION INTRAVENOUS at 09:35

## 2018-02-06 NOTE — OP NOTE
**** GI/ENDOSCOPY REPORT ****     PATIENT NAME: Joselo CEDENO ------ VISIT ID:  Patient ID:   [id number] YOB: 1960     INTRODUCTION: Colonoscopy - A 56 male patient presents for an outpatient   Colonoscopy at [facility]  PREVIOUS COLONOSCOPY:     INDICATIONS: Inflammatory Bowel Disease Surveillance  CONSENT:  The benefits, risks, and alternatives to the procedure were   discussed and informed consent was obtained from the patient  PREPARATION: EKG, pulse, pulse oximetry and blood pressure were monitored   throughout the procedure  The patient was identified by myself both   verbally and by visual inspection of ID band  Airway Assessment   Classification: Airway class 2 - Visualization of the soft palate, fauces   and uvula  ASA Classification: Class 2 - Patient has mild to moderate   systemic disturbance that may or may not be related to the disorder   requiring surgery  MEDICATIONS: Anesthesia-check records     PROCEDURE:  The prostate was smooth and symmetriical  The endoscope was   passed without difficulty through the anus under direct visualization and   advanced to the cecum, confirmed by appendiceal orifice and ileocecal   valve  The scope was withdrawn and the mucosa was carefully examined  The   quality of the preparation was good  Random cold biopsies were taken from   the right colon, transverse colon, left colon, and rectum  The mucosa was   denuded throughout and there was pan - pseudopolyposis  Cecal Intubation   Time: Minute(s) Scope Withdrawal Time: Minute(s)     RECTAL EXAM: Normal rectal exam      FINDINGS: Crohns Pancolitis     COMPLICATIONS: There were no complications       IMPRESSIONS: Crohn's Pancolitis     RECOMMENDATIONS: Colonoscopy one year     ESTIMATED BLOOD LOSS:none     PATHOLOGY SPECIMENS: Biopsies     PROCEDURE CODES: Colonoscopy with biopsy     ICD-9 Codes:     ICD-10 Codes:     PERFORMED BY: Dr Juliocesar Rodarte ARGENIS Ruiz  on 02/06/2018  Version 1, electronically signed by ARGENIS Coulter  on   02/06/2018 at 09:57

## 2018-02-06 NOTE — ANESTHESIA PREPROCEDURE EVALUATION
Review of Systems/Medical History  Patient summary reviewed  Chart reviewed  No history of anesthetic complications     Cardiovascular  Hyperlipidemia, Hypertension , Past MI , CAD, , Cardiac stents     Pulmonary  COPD moderate- medication dependent , Asthma: ,        GI/Hepatic  Negative GI/hepatic ROS   Bowel prep       Negative  ROS        Endo/Other  Negative endo/other ROS      GYN       Hematology  Negative hematology ROS      Musculoskeletal  Negative musculoskeletal ROS        Neurology  Negative neurology ROS      Psychology   Negative psychology ROS              Physical Exam    Airway    Mallampati score: II  TM Distance: >3 FB  Neck ROM: full     Dental   No notable dental hx     Cardiovascular  Cardiovascular exam normal    Pulmonary  Pulmonary exam normal     Other Findings        Anesthesia Plan  ASA Score- 3     Anesthesia Type- IV sedation with anesthesia with ASA Monitors  Additional Monitors:   Airway Plan:     Comment: Discussed with pt the possibility under sedation to have recall or mild discomfort        Plan Factors-    Induction- intravenous  Postoperative Plan-     Informed Consent- Anesthetic plan and risks discussed with patient  I personally reviewed this patient with the CRNA  Discussed and agreed on the Anesthesia Plan with the VAISHALI Knapp

## 2018-02-07 RX ORDER — LISINOPRIL 2.5 MG/1
2.5 TABLET ORAL DAILY
Qty: 90 TABLET | Refills: 0 | Status: ON HOLD | OUTPATIENT
Start: 2018-02-07 | End: 2018-06-01

## 2018-03-24 DIAGNOSIS — J44.9 COPD (CHRONIC OBSTRUCTIVE PULMONARY DISEASE) (HCC): Primary | ICD-10-CM

## 2018-03-26 DIAGNOSIS — J44.9 CHRONIC OBSTRUCTIVE PULMONARY DISEASE, UNSPECIFIED COPD TYPE (HCC): Primary | ICD-10-CM

## 2018-03-26 RX ORDER — ALBUTEROL SULFATE 2.5 MG/3ML
SOLUTION RESPIRATORY (INHALATION)
Qty: 150 ML | Refills: 4 | Status: ON HOLD | OUTPATIENT
Start: 2018-03-26 | End: 2018-06-01

## 2018-03-27 ENCOUNTER — TELEPHONE (OUTPATIENT)
Dept: INTERNAL MEDICINE CLINIC | Facility: CLINIC | Age: 58
End: 2018-03-27

## 2018-03-27 DIAGNOSIS — J44.9 CHRONIC OBSTRUCTIVE PULMONARY DISEASE, UNSPECIFIED COPD TYPE (HCC): ICD-10-CM

## 2018-03-27 NOTE — TELEPHONE ENCOUNTER
LOOKS LIKE YOU REFILLED PTS  SPIRIVA INHALER YESTERDAY BUT IT DIDN'T GET TRANSMITTED TO THE PHARMACY, CAN YOU RESEND   THANKS

## 2018-03-29 DIAGNOSIS — J44.9 CHRONIC OBSTRUCTIVE PULMONARY DISEASE, UNSPECIFIED COPD TYPE (HCC): ICD-10-CM

## 2018-03-29 NOTE — TELEPHONE ENCOUNTER
This is the third time im sending the same medication refill for this, is it not getting to pharmacy?

## 2018-05-15 ENCOUNTER — APPOINTMENT (EMERGENCY)
Dept: RADIOLOGY | Facility: HOSPITAL | Age: 58
DRG: 710 | End: 2018-05-15
Payer: COMMERCIAL

## 2018-05-15 ENCOUNTER — HOSPITAL ENCOUNTER (INPATIENT)
Facility: HOSPITAL | Age: 58
LOS: 17 days | Discharge: HOME HEALTH | DRG: 710 | End: 2018-06-01
Attending: EMERGENCY MEDICINE | Admitting: INTERNAL MEDICINE
Payer: COMMERCIAL

## 2018-05-15 DIAGNOSIS — R00.0 TACHYCARDIA: ICD-10-CM

## 2018-05-15 DIAGNOSIS — K52.9 COLITIS: Primary | ICD-10-CM

## 2018-05-15 DIAGNOSIS — J44.1 COPD WITH EXACERBATION (HCC): ICD-10-CM

## 2018-05-15 DIAGNOSIS — J44.9 COPD (CHRONIC OBSTRUCTIVE PULMONARY DISEASE) (HCC): Primary | ICD-10-CM

## 2018-05-15 DIAGNOSIS — Z43.2 ILEOSTOMY CARE (HCC): ICD-10-CM

## 2018-05-15 DIAGNOSIS — M54.12 CERVICAL RADICULOPATHY: ICD-10-CM

## 2018-05-15 DIAGNOSIS — E44.0 MODERATE PROTEIN-CALORIE MALNUTRITION (HCC): ICD-10-CM

## 2018-05-15 DIAGNOSIS — J44.9 COPD (CHRONIC OBSTRUCTIVE PULMONARY DISEASE) (HCC): ICD-10-CM

## 2018-05-15 DIAGNOSIS — J45.909 ASTHMA, UNSPECIFIED ASTHMA SEVERITY, UNSPECIFIED WHETHER COMPLICATED, UNSPECIFIED WHETHER PERSISTENT: ICD-10-CM

## 2018-05-15 DIAGNOSIS — I48.91 ATRIAL FIBRILLATION WITH RVR (HCC): ICD-10-CM

## 2018-05-15 DIAGNOSIS — K66.8 FREE INTRAPERITONEAL AIR: ICD-10-CM

## 2018-05-15 DIAGNOSIS — I10 ESSENTIAL HYPERTENSION: ICD-10-CM

## 2018-05-15 DIAGNOSIS — K63.1 PERFORATION OF COLON (HCC): ICD-10-CM

## 2018-05-15 DIAGNOSIS — Z93.2 PRESENCE OF ILEOSTOMY (HCC): ICD-10-CM

## 2018-05-15 DIAGNOSIS — I25.10 CORONARY ARTERY DISEASE INVOLVING NATIVE CORONARY ARTERY OF NATIVE HEART WITHOUT ANGINA PECTORIS: ICD-10-CM

## 2018-05-15 PROBLEM — N17.9 ACUTE KIDNEY FAILURE (HCC): Status: ACTIVE | Noted: 2018-05-15

## 2018-05-15 PROBLEM — Z72.0 TOBACCO ABUSE: Status: ACTIVE | Noted: 2018-05-15

## 2018-05-15 PROBLEM — E87.20 LACTIC ACIDOSIS: Status: ACTIVE | Noted: 2018-05-15

## 2018-05-15 PROBLEM — E87.2 LACTIC ACIDOSIS: Status: ACTIVE | Noted: 2018-05-15

## 2018-05-15 PROBLEM — E87.2 HIGH ANION GAP METABOLIC ACIDOSIS: Status: ACTIVE | Noted: 2018-05-15

## 2018-05-15 PROBLEM — R57.9 SHOCK (HCC): Status: ACTIVE | Noted: 2018-05-15

## 2018-05-15 PROBLEM — R77.8 ELEVATED TROPONIN: Status: ACTIVE | Noted: 2018-05-15

## 2018-05-15 PROBLEM — E87.6 HYPOKALEMIA: Status: ACTIVE | Noted: 2018-05-15

## 2018-05-15 PROBLEM — R74.01 TRANSAMINITIS: Status: ACTIVE | Noted: 2018-05-15

## 2018-05-15 PROBLEM — E87.1 HYPONATREMIA: Status: ACTIVE | Noted: 2018-05-15

## 2018-05-15 PROBLEM — R79.89 ELEVATED TROPONIN: Status: ACTIVE | Noted: 2018-05-15

## 2018-05-15 PROBLEM — F10.10 ALCOHOL ABUSE: Status: ACTIVE | Noted: 2018-05-15

## 2018-05-15 PROBLEM — E87.29 HIGH ANION GAP METABOLIC ACIDOSIS: Status: ACTIVE | Noted: 2018-05-15

## 2018-05-15 LAB
ACETONE SERPL-MCNC: NEGATIVE MG/DL
ALBUMIN SERPL BCP-MCNC: 2.5 G/DL (ref 3.5–5)
ALP SERPL-CCNC: 198 U/L (ref 46–116)
ALT SERPL W P-5'-P-CCNC: 76 U/L (ref 12–78)
ANION GAP SERPL CALCULATED.3IONS-SCNC: 16 MMOL/L (ref 4–13)
APTT PPP: 27 SECONDS (ref 24–36)
AST SERPL W P-5'-P-CCNC: 194 U/L (ref 5–45)
ATRIAL RATE: 107 BPM
ATRIAL RATE: 78 BPM
BACTERIA UR QL AUTO: ABNORMAL /HPF
BASE EX.OXY STD BLDV CALC-SCNC: 75.7 % (ref 60–80)
BASE EX.OXY STD BLDV CALC-SCNC: 79.8 % (ref 60–80)
BASE EXCESS BLDA CALC-SCNC: -2 MMOL/L (ref -2–3)
BASE EXCESS BLDV CALC-SCNC: -1.5 MMOL/L
BASE EXCESS BLDV CALC-SCNC: -3.4 MMOL/L
BASOPHILS # BLD AUTO: 0.01 THOUSANDS/ΜL (ref 0–0.1)
BASOPHILS NFR BLD AUTO: 0 % (ref 0–1)
BILIRUB SERPL-MCNC: 0.33 MG/DL (ref 0.2–1)
BILIRUB UR QL STRIP: ABNORMAL
BUN SERPL-MCNC: 23 MG/DL (ref 5–25)
CA-I BLD-SCNC: 0.87 MMOL/L (ref 1.12–1.32)
CALCIUM SERPL-MCNC: 7.4 MG/DL (ref 8.3–10.1)
CHLORIDE SERPL-SCNC: 90 MMOL/L (ref 100–108)
CK MB SERPL-MCNC: 3.1 % (ref 0–2.5)
CK MB SERPL-MCNC: 9.2 NG/ML (ref 0–5)
CK SERPL-CCNC: 295 U/L (ref 39–308)
CLARITY UR: CLEAR
CO2 SERPL-SCNC: 25 MMOL/L (ref 21–32)
COARSE GRAN CASTS URNS QL MICRO: ABNORMAL /LPF
COLOR UR: ABNORMAL
CREAT SERPL-MCNC: 3.33 MG/DL (ref 0.6–1.3)
EOSINOPHIL # BLD AUTO: 0.01 THOUSAND/ΜL (ref 0–0.61)
EOSINOPHIL NFR BLD AUTO: 0 % (ref 0–6)
ERYTHROCYTE [DISTWIDTH] IN BLOOD BY AUTOMATED COUNT: 15.6 % (ref 11.6–15.1)
FINE GRAN CASTS URNS QL MICRO: ABNORMAL /LPF
GFR SERPL CREATININE-BSD FRML MDRD: 20 ML/MIN/1.73SQ M
GLUCOSE SERPL-MCNC: 145 MG/DL (ref 65–140)
GLUCOSE SERPL-MCNC: 149 MG/DL (ref 65–140)
GLUCOSE UR STRIP-MCNC: NEGATIVE MG/DL
HCO3 BLDA-SCNC: 25.4 MMOL/L (ref 24–30)
HCO3 BLDV-SCNC: 24.5 MMOL/L (ref 24–30)
HCO3 BLDV-SCNC: 26.7 MMOL/L (ref 24–30)
HCT VFR BLD AUTO: 39.5 % (ref 36.5–49.3)
HCT VFR BLD CALC: 37 % (ref 36.5–49.3)
HGB BLD-MCNC: 14 G/DL (ref 12–17)
HGB BLDA-MCNC: 12.6 G/DL (ref 12–17)
HGB UR QL STRIP.AUTO: NEGATIVE
HYALINE CASTS #/AREA URNS LPF: ABNORMAL /LPF
IMM GRANULOCYTES # BLD AUTO: 0.02 THOUSAND/UL (ref 0–0.2)
IMM GRANULOCYTES NFR BLD AUTO: 0 % (ref 0–2)
INR PPP: 1.05 (ref 0.86–1.16)
KETONES UR STRIP-MCNC: NEGATIVE MG/DL
LACTATE SERPL-SCNC: 4.8 MMOL/L (ref 0.5–2)
LACTATE SERPL-SCNC: 5 MMOL/L (ref 0.5–2)
LACTATE SERPL-SCNC: 5.1 MMOL/L (ref 0.5–2)
LACTATE SERPL-SCNC: 5.8 MMOL/L (ref 0.5–2)
LEUKOCYTE ESTERASE UR QL STRIP: NEGATIVE
LYMPHOCYTES # BLD AUTO: 0.83 THOUSANDS/ΜL (ref 0.6–4.47)
LYMPHOCYTES NFR BLD AUTO: 8 % (ref 14–44)
MCH RBC QN AUTO: 33.2 PG (ref 26.8–34.3)
MCHC RBC AUTO-ENTMCNC: 35.4 G/DL (ref 31.4–37.4)
MCV RBC AUTO: 94 FL (ref 82–98)
MONOCYTES # BLD AUTO: 0.88 THOUSAND/ΜL (ref 0.17–1.22)
MONOCYTES NFR BLD AUTO: 9 % (ref 4–12)
MUCOUS THREADS UR QL AUTO: ABNORMAL
NEUTROPHILS # BLD AUTO: 8.46 THOUSANDS/ΜL (ref 1.85–7.62)
NEUTS SEG NFR BLD AUTO: 83 % (ref 43–75)
NITRITE UR QL STRIP: NEGATIVE
NON-SQ EPI CELLS URNS QL MICRO: ABNORMAL /HPF
NRBC BLD AUTO-RTO: 0 /100 WBCS
O2 CT BLDV-SCNC: 15.9 ML/DL
O2 CT BLDV-SCNC: 16.8 ML/DL
OSMOLALITY UR/SERPL-RTO: 350 MMOL/KG (ref 282–298)
P AXIS: 81 DEGREES
P AXIS: 93 DEGREES
PCO2 BLD: 27 MMOL/L (ref 21–32)
PCO2 BLD: 53.7 MM HG (ref 42–50)
PCO2 BLDV: 55.9 MM HG (ref 42–50)
PCO2 BLDV: 59.3 MM HG (ref 42–50)
PH BLD: 7.28 [PH] (ref 7.3–7.4)
PH BLDV: 7.26 [PH] (ref 7.3–7.4)
PH BLDV: 7.27 [PH] (ref 7.3–7.4)
PH UR STRIP.AUTO: 5.5 [PH] (ref 4.5–8)
PLATELET # BLD AUTO: 57 THOUSANDS/UL (ref 149–390)
PLATELET # BLD AUTO: 61 THOUSANDS/UL (ref 149–390)
PMV BLD AUTO: 9.7 FL (ref 8.9–12.7)
PMV BLD AUTO: 9.7 FL (ref 8.9–12.7)
PO2 BLD: 47 MM HG (ref 35–45)
PO2 BLDV: 50.5 MM HG (ref 35–45)
PO2 BLDV: 56.3 MM HG (ref 35–45)
POTASSIUM BLD-SCNC: 2.6 MMOL/L (ref 3.5–5.3)
POTASSIUM SERPL-SCNC: 2.8 MMOL/L (ref 3.5–5.3)
PR INTERVAL: 134 MS
PR INTERVAL: 272 MS
PROCALCITONIN SERPL-MCNC: 0.26 NG/ML
PROT SERPL-MCNC: 5.9 G/DL (ref 6.4–8.2)
PROT UR STRIP-MCNC: ABNORMAL MG/DL
PROTHROMBIN TIME: 13.7 SECONDS (ref 11.8–14.2)
QRS AXIS: 96 DEGREES
QRS AXIS: 97 DEGREES
QRSD INTERVAL: 104 MS
QRSD INTERVAL: 108 MS
QT INTERVAL: 302 MS
QT INTERVAL: 370 MS
QTC INTERVAL: 403 MS
QTC INTERVAL: 493 MS
RBC # BLD AUTO: 4.22 MILLION/UL (ref 3.88–5.62)
RBC #/AREA URNS AUTO: ABNORMAL /HPF
SAO2 % BLD FROM PO2: 77 % (ref 95–98)
SODIUM BLD-SCNC: 133 MMOL/L (ref 136–145)
SODIUM SERPL-SCNC: 131 MMOL/L (ref 136–145)
SP GR UR STRIP.AUTO: 1.02 (ref 1–1.03)
SPECIMEN SOURCE: ABNORMAL
T WAVE AXIS: 77 DEGREES
T WAVE AXIS: 79 DEGREES
TROPONIN I SERPL-MCNC: 0.47 NG/ML
TROPONIN I SERPL-MCNC: 0.72 NG/ML
TROPONIN I SERPL-MCNC: 0.85 NG/ML
TSH SERPL DL<=0.05 MIU/L-ACNC: 2.38 UIU/ML (ref 0.36–3.74)
UROBILINOGEN UR QL STRIP.AUTO: 0.2 E.U./DL
VENTRICULAR RATE: 107 BPM
VENTRICULAR RATE: 107 BPM
WBC # BLD AUTO: 10.21 THOUSAND/UL (ref 4.31–10.16)
WBC #/AREA URNS AUTO: ABNORMAL /HPF

## 2018-05-15 PROCEDURE — 81001 URINALYSIS AUTO W/SCOPE: CPT

## 2018-05-15 PROCEDURE — 84484 ASSAY OF TROPONIN QUANT: CPT | Performed by: EMERGENCY MEDICINE

## 2018-05-15 PROCEDURE — 84132 ASSAY OF SERUM POTASSIUM: CPT

## 2018-05-15 PROCEDURE — 82805 BLOOD GASES W/O2 SATURATION: CPT | Performed by: EMERGENCY MEDICINE

## 2018-05-15 PROCEDURE — 84484 ASSAY OF TROPONIN QUANT: CPT | Performed by: NURSE PRACTITIONER

## 2018-05-15 PROCEDURE — 84145 PROCALCITONIN (PCT): CPT | Performed by: NURSE PRACTITIONER

## 2018-05-15 PROCEDURE — 96368 THER/DIAG CONCURRENT INF: CPT

## 2018-05-15 PROCEDURE — 87040 BLOOD CULTURE FOR BACTERIA: CPT | Performed by: EMERGENCY MEDICINE

## 2018-05-15 PROCEDURE — 82550 ASSAY OF CK (CPK): CPT | Performed by: NURSE PRACTITIONER

## 2018-05-15 PROCEDURE — 99285 EMERGENCY DEPT VISIT HI MDM: CPT

## 2018-05-15 PROCEDURE — 83930 ASSAY OF BLOOD OSMOLALITY: CPT | Performed by: FAMILY MEDICINE

## 2018-05-15 PROCEDURE — 94640 AIRWAY INHALATION TREATMENT: CPT

## 2018-05-15 PROCEDURE — 85025 COMPLETE CBC W/AUTO DIFF WBC: CPT | Performed by: EMERGENCY MEDICINE

## 2018-05-15 PROCEDURE — 71250 CT THORAX DX C-: CPT

## 2018-05-15 PROCEDURE — 82553 CREATINE MB FRACTION: CPT | Performed by: NURSE PRACTITIONER

## 2018-05-15 PROCEDURE — 80053 COMPREHEN METABOLIC PANEL: CPT | Performed by: EMERGENCY MEDICINE

## 2018-05-15 PROCEDURE — 84295 ASSAY OF SERUM SODIUM: CPT

## 2018-05-15 PROCEDURE — 71045 X-RAY EXAM CHEST 1 VIEW: CPT

## 2018-05-15 PROCEDURE — 96366 THER/PROPH/DIAG IV INF ADDON: CPT

## 2018-05-15 PROCEDURE — 94644 CONT INHLJ TX 1ST HOUR: CPT

## 2018-05-15 PROCEDURE — 96375 TX/PRO/DX INJ NEW DRUG ADDON: CPT

## 2018-05-15 PROCEDURE — 36415 COLL VENOUS BLD VENIPUNCTURE: CPT | Performed by: EMERGENCY MEDICINE

## 2018-05-15 PROCEDURE — 93005 ELECTROCARDIOGRAM TRACING: CPT

## 2018-05-15 PROCEDURE — 96365 THER/PROPH/DIAG IV INF INIT: CPT

## 2018-05-15 PROCEDURE — 84443 ASSAY THYROID STIM HORMONE: CPT | Performed by: EMERGENCY MEDICINE

## 2018-05-15 PROCEDURE — 83605 ASSAY OF LACTIC ACID: CPT | Performed by: EMERGENCY MEDICINE

## 2018-05-15 PROCEDURE — 96367 TX/PROPH/DG ADDL SEQ IV INF: CPT

## 2018-05-15 PROCEDURE — 81002 URINALYSIS NONAUTO W/O SCOPE: CPT | Performed by: EMERGENCY MEDICINE

## 2018-05-15 PROCEDURE — 74176 CT ABD & PELVIS W/O CONTRAST: CPT

## 2018-05-15 PROCEDURE — 85610 PROTHROMBIN TIME: CPT | Performed by: EMERGENCY MEDICINE

## 2018-05-15 PROCEDURE — 87493 C DIFF AMPLIFIED PROBE: CPT | Performed by: NURSE PRACTITIONER

## 2018-05-15 PROCEDURE — 85730 THROMBOPLASTIN TIME PARTIAL: CPT | Performed by: EMERGENCY MEDICINE

## 2018-05-15 PROCEDURE — 83605 ASSAY OF LACTIC ACID: CPT | Performed by: NURSE PRACTITIONER

## 2018-05-15 PROCEDURE — 85049 AUTOMATED PLATELET COUNT: CPT | Performed by: NURSE PRACTITIONER

## 2018-05-15 PROCEDURE — 94760 N-INVAS EAR/PLS OXIMETRY 1: CPT

## 2018-05-15 PROCEDURE — 93010 ELECTROCARDIOGRAM REPORT: CPT | Performed by: INTERNAL MEDICINE

## 2018-05-15 PROCEDURE — 85014 HEMATOCRIT: CPT

## 2018-05-15 PROCEDURE — 82009 KETONE BODYS QUAL: CPT | Performed by: EMERGENCY MEDICINE

## 2018-05-15 PROCEDURE — 99291 CRITICAL CARE FIRST HOUR: CPT | Performed by: INTERNAL MEDICINE

## 2018-05-15 PROCEDURE — 82330 ASSAY OF CALCIUM: CPT

## 2018-05-15 PROCEDURE — 82803 BLOOD GASES ANY COMBINATION: CPT

## 2018-05-15 PROCEDURE — 82947 ASSAY GLUCOSE BLOOD QUANT: CPT

## 2018-05-15 RX ORDER — POTASSIUM CHLORIDE 20 MEQ/1
40 TABLET, EXTENDED RELEASE ORAL ONCE
Status: COMPLETED | OUTPATIENT
Start: 2018-05-15 | End: 2018-05-15

## 2018-05-15 RX ORDER — NICOTINE 21 MG/24HR
14 PATCH, TRANSDERMAL 24 HOURS TRANSDERMAL DAILY
Status: DISCONTINUED | OUTPATIENT
Start: 2018-05-15 | End: 2018-06-01 | Stop reason: HOSPADM

## 2018-05-15 RX ORDER — SODIUM CHLORIDE, SODIUM GLUCONATE, SODIUM ACETATE, POTASSIUM CHLORIDE, MAGNESIUM CHLORIDE, SODIUM PHOSPHATE, DIBASIC, AND POTASSIUM PHOSPHATE .53; .5; .37; .037; .03; .012; .00082 G/100ML; G/100ML; G/100ML; G/100ML; G/100ML; G/100ML; G/100ML
1000 INJECTION, SOLUTION INTRAVENOUS ONCE
Status: COMPLETED | OUTPATIENT
Start: 2018-05-15 | End: 2018-05-16

## 2018-05-15 RX ORDER — LEVALBUTEROL 1.25 MG/.5ML
1.25 SOLUTION, CONCENTRATE RESPIRATORY (INHALATION) 4 TIMES DAILY
Status: DISCONTINUED | OUTPATIENT
Start: 2018-05-15 | End: 2018-05-15

## 2018-05-15 RX ORDER — METHYLPREDNISOLONE SODIUM SUCCINATE 40 MG/ML
40 INJECTION, POWDER, LYOPHILIZED, FOR SOLUTION INTRAMUSCULAR; INTRAVENOUS EVERY 8 HOURS SCHEDULED
Status: DISCONTINUED | OUTPATIENT
Start: 2018-05-16 | End: 2018-05-23

## 2018-05-15 RX ORDER — LORAZEPAM 2 MG/ML
0.5 INJECTION INTRAMUSCULAR EVERY 2 HOUR PRN
Status: DISCONTINUED | OUTPATIENT
Start: 2018-05-15 | End: 2018-05-21

## 2018-05-15 RX ORDER — LORAZEPAM 2 MG/ML
1 INJECTION INTRAMUSCULAR EVERY 2 HOUR PRN
Status: DISCONTINUED | OUTPATIENT
Start: 2018-05-15 | End: 2018-05-21

## 2018-05-15 RX ORDER — SODIUM CHLORIDE, SODIUM GLUCONATE, SODIUM ACETATE, POTASSIUM CHLORIDE, MAGNESIUM CHLORIDE, SODIUM PHOSPHATE, DIBASIC, AND POTASSIUM PHOSPHATE .53; .5; .37; .037; .03; .012; .00082 G/100ML; G/100ML; G/100ML; G/100ML; G/100ML; G/100ML; G/100ML
1000 INJECTION, SOLUTION INTRAVENOUS ONCE
Status: COMPLETED | OUTPATIENT
Start: 2018-05-15 | End: 2018-05-15

## 2018-05-15 RX ORDER — METHYLPREDNISOLONE SODIUM SUCCINATE 125 MG/2ML
125 INJECTION, POWDER, LYOPHILIZED, FOR SOLUTION INTRAMUSCULAR; INTRAVENOUS ONCE
Status: COMPLETED | OUTPATIENT
Start: 2018-05-15 | End: 2018-05-15

## 2018-05-15 RX ORDER — POTASSIUM CHLORIDE 14.9 MG/ML
20 INJECTION INTRAVENOUS ONCE
Status: COMPLETED | OUTPATIENT
Start: 2018-05-15 | End: 2018-05-15

## 2018-05-15 RX ORDER — HEPARIN SODIUM 5000 [USP'U]/ML
5000 INJECTION, SOLUTION INTRAVENOUS; SUBCUTANEOUS EVERY 8 HOURS SCHEDULED
Status: DISCONTINUED | OUTPATIENT
Start: 2018-05-15 | End: 2018-05-20

## 2018-05-15 RX ORDER — VANCOMYCIN HYDROCHLORIDE 1 G/200ML
15 INJECTION, SOLUTION INTRAVENOUS ONCE
Status: COMPLETED | OUTPATIENT
Start: 2018-05-15 | End: 2018-05-15

## 2018-05-15 RX ORDER — SODIUM CHLORIDE, SODIUM GLUCONATE, SODIUM ACETATE, POTASSIUM CHLORIDE, MAGNESIUM CHLORIDE, SODIUM PHOSPHATE, DIBASIC, AND POTASSIUM PHOSPHATE .53; .5; .37; .037; .03; .012; .00082 G/100ML; G/100ML; G/100ML; G/100ML; G/100ML; G/100ML; G/100ML
125 INJECTION, SOLUTION INTRAVENOUS CONTINUOUS
Status: DISCONTINUED | OUTPATIENT
Start: 2018-05-15 | End: 2018-05-16

## 2018-05-15 RX ORDER — OXAZEPAM 15 MG/1
15 CAPSULE ORAL EVERY 8 HOURS SCHEDULED
Status: DISCONTINUED | OUTPATIENT
Start: 2018-05-15 | End: 2018-05-16

## 2018-05-15 RX ORDER — SODIUM CHLORIDE, SODIUM GLUCONATE, SODIUM ACETATE, POTASSIUM CHLORIDE, MAGNESIUM CHLORIDE, SODIUM PHOSPHATE, DIBASIC, AND POTASSIUM PHOSPHATE .53; .5; .37; .037; .03; .012; .00082 G/100ML; G/100ML; G/100ML; G/100ML; G/100ML; G/100ML; G/100ML
125 INJECTION, SOLUTION INTRAVENOUS CONTINUOUS
Status: DISCONTINUED | OUTPATIENT
Start: 2018-05-15 | End: 2018-05-17

## 2018-05-15 RX ORDER — SODIUM CHLORIDE FOR INHALATION 0.9 %
3 VIAL, NEBULIZER (ML) INHALATION ONCE
Status: COMPLETED | OUTPATIENT
Start: 2018-05-15 | End: 2018-05-15

## 2018-05-15 RX ORDER — LEVALBUTEROL 1.25 MG/.5ML
1.25 SOLUTION, CONCENTRATE RESPIRATORY (INHALATION)
Status: DISCONTINUED | OUTPATIENT
Start: 2018-05-16 | End: 2018-05-17

## 2018-05-15 RX ORDER — LORAZEPAM 2 MG/ML
2 INJECTION INTRAMUSCULAR EVERY 2 HOUR PRN
Status: DISCONTINUED | OUTPATIENT
Start: 2018-05-15 | End: 2018-05-21

## 2018-05-15 RX ADMIN — ALBUTEROL SULFATE 10 MG: 2.5 SOLUTION RESPIRATORY (INHALATION) at 13:21

## 2018-05-15 RX ADMIN — SODIUM CHLORIDE, SODIUM GLUCONATE, SODIUM ACETATE, POTASSIUM CHLORIDE, MAGNESIUM CHLORIDE, SODIUM PHOSPHATE, DIBASIC, AND POTASSIUM PHOSPHATE 1000 ML: .53; .5; .37; .037; .03; .012; .00082 INJECTION, SOLUTION INTRAVENOUS at 19:40

## 2018-05-15 RX ADMIN — NICOTINE 14 MG: 14 PATCH, EXTENDED RELEASE TRANSDERMAL at 20:11

## 2018-05-15 RX ADMIN — SODIUM CHLORIDE 1000 ML: 0.9 INJECTION, SOLUTION INTRAVENOUS at 13:48

## 2018-05-15 RX ADMIN — IPRATROPIUM BROMIDE 0.5 MG: 0.5 SOLUTION RESPIRATORY (INHALATION) at 17:43

## 2018-05-15 RX ADMIN — OXAZEPAM 15 MG: 15 CAPSULE, GELATIN COATED ORAL at 23:00

## 2018-05-15 RX ADMIN — LEVALBUTEROL HYDROCHLORIDE 1.25 MG: 1.25 SOLUTION, CONCENTRATE RESPIRATORY (INHALATION) at 17:43

## 2018-05-15 RX ADMIN — NOREPINEPHRINE BITARTRATE 5 MCG/MIN: 1 INJECTION INTRAVENOUS at 15:20

## 2018-05-15 RX ADMIN — IPRATROPIUM BROMIDE 1 MG: 0.5 SOLUTION RESPIRATORY (INHALATION) at 13:21

## 2018-05-15 RX ADMIN — ISODIUM CHLORIDE 3 ML: 0.03 SOLUTION RESPIRATORY (INHALATION) at 13:21

## 2018-05-15 RX ADMIN — VANCOMYCIN HYDROCHLORIDE 1000 MG: 1 INJECTION, SOLUTION INTRAVENOUS at 16:02

## 2018-05-15 RX ADMIN — CEFEPIME HYDROCHLORIDE 2000 MG: 2 INJECTION, POWDER, FOR SOLUTION INTRAVENOUS at 14:33

## 2018-05-15 RX ADMIN — POTASSIUM CHLORIDE 40 MEQ: 1500 TABLET, EXTENDED RELEASE ORAL at 13:38

## 2018-05-15 RX ADMIN — METRONIDAZOLE 500 MG: 500 INJECTION, SOLUTION INTRAVENOUS at 20:15

## 2018-05-15 RX ADMIN — METHYLPREDNISOLONE SODIUM SUCCINATE 125 MG: 125 INJECTION, POWDER, FOR SOLUTION INTRAMUSCULAR; INTRAVENOUS at 13:16

## 2018-05-15 RX ADMIN — POTASSIUM CHLORIDE 20 MEQ: 200 INJECTION, SOLUTION INTRAVENOUS at 13:41

## 2018-05-15 RX ADMIN — HEPARIN SODIUM 5000 UNITS: 5000 INJECTION, SOLUTION INTRAVENOUS; SUBCUTANEOUS at 23:00

## 2018-05-15 RX ADMIN — LORAZEPAM 2 MG: 2 INJECTION INTRAMUSCULAR; INTRAVENOUS at 19:32

## 2018-05-15 RX ADMIN — SODIUM CHLORIDE, SODIUM GLUCONATE, SODIUM ACETATE, POTASSIUM CHLORIDE, MAGNESIUM CHLORIDE, SODIUM PHOSPHATE, DIBASIC, AND POTASSIUM PHOSPHATE 125 ML/HR: .53; .5; .37; .037; .03; .012; .00082 INJECTION, SOLUTION INTRAVENOUS at 18:45

## 2018-05-15 NOTE — ED ATTENDING ATTESTATION
Chiara Singh MD, saw and evaluated the patient  I have discussed the patient with the resident/non-physician practitioner and agree with the resident's/non-physician practitioner's findings, Plan of Care, and MDM as documented in the resident's/non-physician practitioner's note, except where noted  All available labs and Radiology studies were reviewed  and urinated himself  At this point I agree with the current assessment done in the Emergency Department  I have conducted an independent evaluation of this patient a history and physical is as follows:    Patient presents to the emergency department with chief complaint of weakness  The patient states that he has been weak over the last 4 days and has not been able to get off his sofa  The patient has therefore been lying on the sofa and has stooled himself and urinated on himself  The patient admits to being short of breath but states that he is always short of breath due to his COPD  The patient reports taking nebulizer treatments  The patient also admits to drinking alcohol on a daily basis and admits that he drank alcohol today  The patient reports that he frequently vomits after drinking alcohol which she did today  He chronically has tightness in his chest from his COPD and that has not worsened today  Patient all so denies change in mental status or focal neurologic deficit  Physical exam demonstrates a male who appears to be tachypnea with poor hygiene  HEENT exam demonstrates no evidence of trauma  The neck was supple nontender  Lungs had diffuse wheezes and prolonged expiratory phase  The patient was tachypnea  He the patient had accessory muscle use but he did not appear to be fatigued  Heart was mildly tachycardic and regular  The abdomen was soft with minimal diffuse tenderness  There is no rebound or guarding  Extremities: The right proximal thigh is erythematous and raw in an area where he had caked stool on his leg  There is no fluctuance or induration  The buttocks have no induration  The perineum has no evidence of maribel's gangrene  There is no focal neurologic deficit the patient is alert and oriented x3      Critical Care Time  CritCare Time    Procedures

## 2018-05-15 NOTE — ED PROVIDER NOTES
History  Chief Complaint   Patient presents with    Weakness - Generalized     Per EMS, pt hasn't moved from couch in the past couple of days  Pt reports hx of COPD, had difficulty breathing today  56year old male presents to the ED for evaluation of shortness of breath that acutely got worse in the past 4 days  Patient has hx of COPD and 2MI with RCA drug-eluting stent, he states that he hasn't moved from his couch in 4 days  He has his albuterol nebulizer that he has been using TID  He denies being on blood thinners  He states that he has 3 episodes of non-bloody, non-bilious vomiting everytime he drank alcohol  He states that he has a chronic cough with green phlegm  He denies hx of PE/DVT  He states that he does drink alcohol everyday and has hx of etoh withdrawal  He states denies fever,chest pain, abdominal pain, dysuria, constipation, diarrhea  Prior to Admission Medications   Prescriptions Last Dose Informant Patient Reported? Taking? Mometasone Furo-Formoterol Fum (DULERA) 200-5 MCG/ACT AERO Unknown at Unknown time  No No   Sig: Inhale 2 puffs every 12 (twelve) hours   Tiotropium Bromide Monohydrate (SPIRIVA RESPIMAT) 2 5 MCG/ACT AERS Unknown at Unknown time  Yes No   Sig: Inhale 2 puffs daily   Tiotropium Bromide Monohydrate (SPIRIVA RESPIMAT) 2 5 MCG/ACT AERS Unknown at Unknown time  No No   Sig: Inhale 1 Act (2 5 mcg total) daily for 90 days   VENTOLIN  (90 Base) MCG/ACT inhaler   No No   Sig: Inhale 2 puffs every 4 (four) hours as needed for wheezing or shortness of breath   albuterol (2 5 mg/3 mL) 0 083 % nebulizer solution Unknown at Unknown time  No No   Sig: USE 1 UNIT DOSE EVERY 4-6 HOURS AS NEEDED FOR WHEEZING      atorvastatin (LIPITOR) 40 mg tablet   No No   Sig: Take 1 tablet by mouth daily with dinner for 30 days   gabapentin (NEURONTIN) 300 mg capsule Unknown at Unknown time  No No   Sig: Take 1 capsule (300 mg total) by mouth 3 (three) times a day   lisinopril (ZESTRIL) 2 5 mg tablet   No No   Sig: Take 1 tablet (2 5 mg total) by mouth daily for 90 days   metoprolol tartrate (LOPRESSOR) 25 mg tablet   No No   Sig: Take 0 5 tablets (12 5 mg total) by mouth every 12 (twelve) hours for 30 days      Facility-Administered Medications: None       Past Medical History:   Diagnosis Date    Asthma     Cardiac disease     Colitis     Colon polyps     COPD (chronic obstructive pulmonary disease) (HCC)     Coronary artery disease     Diverticulitis     Hyperlipidemia     Hypertension     Myocardial infarction ([facility] [address] )     Ulcerative colitis ([facility] )        Past Surgical History:   Procedure Laterality Date    ANGIOPLASTY      COLON SURGERY      COLONOSCOPY N/A 10/24/2016    Procedure: COLONOSCOPY;  Surgeon: Violet Tran MD;  Location: [facility]; Service:     CORONARY ANGIOPLASTY WITH STENT PLACEMENT      ESOPHAGOGASTRODUODENOSCOPY N/A 10/24/2016    Procedure: ESOPHAGOGASTRODUODENOSCOPY (EGD); Surgeon: Violet Tran MD;  Location: [facility]; Service:     [place] COLONOSCOPY FLX DX W/[facility] WHEN PFRMD N/A 2/6/2018    Procedure: COLONOSCOPY;  Surgeon: Violet Tran MD;  Location: [facility]; Service: Colorectal    TONSILLECTOMY         History reviewed  No pertinent family history  I have reviewed and agree with the history as documented  Social History   Substance Use Topics    Smoking status: Current Every Day Smoker     Types: Cigars    Smokeless tobacco: Never Used    Alcohol use Yes      Comment: kenji occassionally        Review of Systems   Constitutional: Negative for appetite change, chills, diaphoresis, fatigue and fever  HENT: Negative for congestion, ear discharge, ear pain, hearing loss, postnasal drip, rhinorrhea, sneezing and sore throat  Eyes: Negative for pain, discharge and redness  Respiratory: Positive for shortness of breath  Negative for cough, choking, chest tightness, wheezing and stridor      Cardiovascular: Negative for chest pain and palpitations  Gastrointestinal: Negative for abdominal distention, abdominal pain, blood in stool, constipation, diarrhea, nausea and vomiting  Genitourinary: Negative for decreased urine volume, difficulty urinating, dysuria, flank pain, frequency and hematuria  Musculoskeletal: Negative for arthralgias, gait problem, joint swelling and neck pain  Skin: Negative for color change, pallor and rash  Allergic/Immunologic: Negative for environmental allergies, food allergies and immunocompromised state  Neurological: Positive for weakness  Negative for dizziness, seizures, light-headedness, numbness and headaches  Hematological: Negative for adenopathy  Does not bruise/bleed easily  Psychiatric/Behavioral: Negative for agitation and behavioral problems  Physical Exam  ED Triage Vitals   Temperature Pulse Respirations Blood Pressure SpO2   05/15/18 1240 05/15/18 1239 05/15/18 1239 05/15/18 1239 05/15/18 1239   98 3 °F (36 8 °C) (!) 107 22 (!) 62/36 97 %      Temp Source Heart Rate Source Patient Position - Orthostatic VS BP Location FiO2 (%)   05/15/18 1240 05/15/18 1239 05/15/18 1239 05/15/18 1239 05/17/18 0400   Oral Monitor Sitting Left arm 40      Pain Score       05/15/18 1239       No Pain           Orthostatic Vital Signs  Vitals:    05/18/18 1208 05/18/18 1425 05/18/18 1500 05/18/18 1600   BP: 125/86 124/86  131/81   Pulse: 90 94 97 100   Patient Position - Orthostatic VS: Sitting Sitting  Sitting       Physical Exam   Constitutional: He is oriented to person, place, and time  He appears well-developed and well-nourished  HENT:   Head: Normocephalic and atraumatic  Nose: Nose normal    Mouth/Throat: Oropharynx is clear and moist    Eyes: Conjunctivae and EOM are normal  Pupils are equal, round, and reactive to light  Neck: Normal range of motion  Neck supple  Cardiovascular: Regular rhythm and normal heart sounds  Exam reveals no gallop and no friction rub      No murmur heard  tachycardia   Pulmonary/Chest: Effort normal  No respiratory distress  He has wheezes  He has no rales  Abdominal: Soft  Bowel sounds are normal  He exhibits no distension  There is no tenderness  There is no rebound and no guarding  Musculoskeletal: Normal range of motion  Neurological: He is alert and oriented to person, place, and time  Skin: Skin is warm and dry  Psychiatric: He has a normal mood and affect  His behavior is normal    Nursing note and vitals reviewed        ED Medications  Medications   heparin (porcine) subcutaneous injection 5,000 Units (5,000 Units Subcutaneous Given 5/18/18 1357)   methylPREDNISolone sodium succinate (Solu-MEDROL) injection 40 mg (40 mg Intravenous Given 5/18/18 1357)   nicotine (NICODERM CQ) 14 mg/24hr TD 24 hr patch 14 mg (14 mg Transdermal Medication Applied 5/18/18 0844)   LORazepam (ATIVAN) 2 mg/mL injection 0 5 mg ( Intravenous See Alternative 5/17/18 0401)     Or   LORazepam (ATIVAN) 2 mg/mL injection 1 mg ( Intravenous See Alternative 5/17/18 0401)     Or   LORazepam (ATIVAN) 2 mg/mL injection 2 mg (2 mg Intravenous Given 5/17/18 0401)   oxazepam (SERAX) capsule 15 mg (15 mg Oral Given 5/18/18 1715)   ipratropium (ATROVENT) 0 02 % inhalation solution 0 5 mg ( Nebulization Canceled Entry 5/18/18 1400)   levalbuterol (XOPENEX) inhalation solution 1 25 mg ( Nebulization Canceled Entry 5/18/18 1400)   lactulose 20 g/30 mL oral solution 20 g (20 g Oral Given 5/18/18 0844)   budesonide (PULMICORT) inhalation solution 0 5 mg (0 5 mg Nebulization Given 5/18/18 0739)   potassium-sodium phosphateS (K-PHOS,PHOSPHA 250) -250 mg tablet 2 tablet (2 tablets Oral Given 5/18/18 1715)   insulin lispro (HumaLOG) 100 units/mL subcutaneous injection 2-12 Units (6 Units Subcutaneous Given 5/18/18 1353)    EMS REPLENISHMENT MED ( Does not apply Given to EMS 5/15/18 1331)   albuterol inhalation solution 10 mg (10 mg Nebulization Given 5/15/18 1321)     And ipratropium (ATROVENT) 0 02 % inhalation solution 1 mg (1 mg Nebulization Given 5/15/18 1321)     And   sodium chloride 0 9 % inhalation solution 3 mL (3 mL Nebulization Given 5/15/18 1321)   methylPREDNISolone sodium succinate (Solu-MEDROL) injection 125 mg (125 mg Intravenous Given 5/15/18 1316)   sodium chloride 0 9 % bolus 1,000 mL (0 mL Intravenous Stopped 5/15/18 1516)   potassium chloride 20 mEq IVPB (premix) (0 mEq Intravenous Stopped 5/15/18 1551)   potassium chloride (K-DUR,KLOR-CON) CR tablet 40 mEq (40 mEq Oral Given 5/15/18 1338)   vancomycin (VANCOCIN) IVPB (premix) 1,000 mg (0 mg/kg × 65 8 kg Intravenous Stopped 5/15/18 1716)   cefepime (MAXIPIME) 2 g/50 mL dextrose IVPB (0 mg Intravenous Stopped 5/15/18 1517)   multi-electrolyte (ISOLYTE-S PH 7 4) bolus 1,000 mL (0 mL Intravenous Stopped 5/15/18 2300)   multi-electrolyte (ISOLYTE-S PH 7 4) bolus 1,000 mL (0 mL Intravenous Stopped 5/16/18 0135)   albumin human (FLEXBUMIN) 5 % injection 25 g (0 g Intravenous Stopped 5/16/18 0343)   albumin human (FLEXBUMIN) 25 % injection **AcuDose Override Pull** (0 g  Stopped 5/16/18 0343)   multi-electrolyte (ISOLYTE-S PH 7 4) bolus 1,000 mL (0 mL Intravenous Stopped 5/16/18 0515)   diazepam (VALIUM) injection 10 mg (10 mg Intravenous Given 5/16/18 0320)   diazepam (VALIUM) injection 10 mg (10 mg Intravenous Given 5/16/18 0351)   LORazepam (ATIVAN) 2 mg/mL injection 2 mg (2 mg Intravenous Given 5/16/18 0452)   magnesium sulfate 4 g/100 mL IVPB (premix) 4 g (0 g Intravenous Stopped 5/16/18 0827)   diazepam (VALIUM) injection 10 mg (10 mg Intravenous Given 5/17/18 0502)   diazepam (VALIUM) injection 10 mg (10 mg Intravenous Given 5/17/18 0554)   sodium phosphate 30 mmol in dextrose 5 % 250 mL Infusion (0 mmol Intravenous Stopped 5/18/18 0700)   albuterol CONTINUOUS nebulizing solution (10 mg Nebulization Given 5/17/18 1635)   sodium chloride 0 9 % inhalation solution 8 mL (9 mL Nebulization Given 5/17/18 1645) potassium chloride (K-DUR,KLOR-CON) CR tablet 40 mEq (40 mEq Oral Given 5/18/18 0844)       Diagnostic Studies  Results Reviewed     Procedure Component Value Units Date/Time    Blood culture #1 [[id number]] Collected:  05/15/18 1358    Lab Status:  Preliminary result Specimen:  Blood from Arm, Left Updated:  05/18/18 1701     Blood Culture No Growth at 72 hrs  Blood culture #2 [[id number]] Collected:  05/15/18 1354    Lab Status:  Preliminary result Specimen:  Blood from Arm, Right Updated:  05/18/18 1701     Blood Culture No Growth at 72 hrs  CKMB [[id number]]  (Abnormal) Collected:  05/15/18 1304    Lab Status:  Final result Specimen:  Blood from Arm, Right Updated:  05/15/18 2155     CK-MB Index 3 1 (H) %      CK-MB FRACTION 9 2 (H) ng/mL     CK (with reflex to MB) [[id number]]  (Normal) Collected:  05/15/18 1304    Lab Status:  Final result Specimen:  Blood from Arm, Right Updated:  05/15/18 2029     Total  U/L     Lactic acid x2 Q2H [[id number]]  (Abnormal) Collected:  05/15/18 1728    Lab Status:  Final result Specimen:  Blood from Arm, Left Updated:  05/15/18 1823     LACTIC ACID 4 8 (HH) mmol/L     Narrative:         Result may be elevated if tourniquet was used during collection  Troponin I [[id number]]  (Abnormal) Collected:  05/15/18 1728    Lab Status:  Final result Specimen:  Blood from Arm, Left Updated:  05/15/18 1814     Troponin I 0 72 (H) ng/mL     Narrative:         Siemens Chemistry analyzer 99% cutoff is > 0 04 ng/mL in network labs    o cTnI 99% cutoff is useful only when applied to patients in the clinical setting of myocardial ischemia  o cTnI 99% cutoff should be interpreted in the context of clinical history, ECG findings and possibly cardiac imaging to establish correct diagnosis  o cTnI 99% cutoff may be suggestive but clearly not indicative of a coronary event without the clinical setting of myocardial ischemia      Urine Microscopic [[id number]]  (Abnormal) Collected:  05/15/18 9799 Lab Status:  Final result Specimen:  Urine from Urine, Other Updated:  05/15/18 1753     RBC, UA None Seen /hpf      WBC, UA 2-4 (A) /hpf      Epithelial Cells None Seen /hpf      Bacteria, UA None Seen /hpf      Hyaline Casts, UA 5-10 (A) /lpf      Fine granular casts 3-5 /lpf      COARSE GRANULAR CASTS 3-5 /lpf      MUCOUS THREADS Occasional (A)    Lactic acid x2 Q2H [[id number]]  (Abnormal) Collected:  05/15/18 1530    Lab Status:  Final result Specimen:  Blood from Arm, Left Updated:  05/15/18 1655     LACTIC ACID 5 0 (HH) mmol/L     Narrative:         Result may be elevated if tourniquet was used during collection      ED Urine Macroscopic [[id number]]  (Abnormal) Collected:  05/15/18 1635    Lab Status:  Final result Specimen:  Urine Updated:  05/15/18 1630     Color, UA Brown     Clarity, UA Clear     pH, UA 5 5     Leukocytes, UA Negative     Nitrite, UA Negative     Protein,  (2+) (A) mg/dl      Glucose, UA Negative mg/dl      Ketones, UA Negative mg/dl      Urobilinogen, UA 0 2 E U /dl      Bilirubin, UA Interference- unable to analyze (A)     Blood, UA Negative     Specific Gravity, UA 1 025    Narrative:       CLINITEK RESULT    POCT urinalysis dipstick [[id number]]  (Normal) Resulted:  05/15/18 1626    Lab Status:  Final result Specimen:  Urine Updated:  05/15/18 1626     Color, UA --    Blood gas, venous [[id number]]  (Abnormal) Collected:  05/15/18 1550    Lab Status:  Final result Specimen:  Blood Updated:  05/15/18 1555     pH, Ravindra 7 260 (L)     pCO2, Ravindra 55 9 (H) mm Hg      pO2, Ravindra 56 3 (H) mm Hg      HCO3, Ravindra 24 5 mmol/L      Base Excess, Ravindra -3 4 mmol/L      O2 Content, Ravindra 16 8 ml/dL      O2 HGB, VENOUS 79 8 %     CBC and differential [[id number]]  (Abnormal) Collected:  05/15/18 1304    Lab Status:  Final result Specimen:  Blood from Arm, Right Updated:  05/15/18 1411     WBC 10 21 (H) Thousand/uL      RBC 4 22 Million/uL      Hemoglobin 14 0 g/dL      Hematocrit 39 5 %      MCV 94 fL      MCH 33 2 pg      MCHC 35 4 g/dL      RDW 15 6 (H) %      MPV 9 7 fL      Platelets 61 (L) Thousands/uL      nRBC 0 /100 WBCs      Neutrophils Relative 83 (H) %      Immat GRANS % 0 %      Lymphocytes Relative 8 (L) %      Monocytes Relative 9 %      Eosinophils Relative 0 %      Basophils Relative 0 %      Neutrophils Absolute 8 46 (H) Thousands/µL      Immature Grans Absolute 0 02 Thousand/uL      Lymphocytes Absolute 0 83 Thousands/µL      Monocytes Absolute 0 88 Thousand/µL      Eosinophils Absolute 0 01 Thousand/µL      Basophils Absolute 0 01 Thousands/µL     Lactic acid, plasma [[id number]]  (Abnormal) Collected:  05/15/18 1327    Lab Status:  Final result Specimen:  Blood from Arm, Right Updated:  05/15/18 1409     LACTIC ACID 5 8 (HH) mmol/L     Narrative:         Result may be elevated if tourniquet was used during collection  Comprehensive metabolic panel [[id number]]  (Abnormal) Collected:  05/15/18 1304    Lab Status:  Final result Specimen:  Blood from Arm, Right Updated:  05/15/18 1403     Sodium 131 (L) mmol/L      Potassium 2 8 (L) mmol/L      Chloride 90 (L) mmol/L      CO2 25 mmol/L      Anion Gap 16 (H) mmol/L      BUN 23 mg/dL      Creatinine 3 33 (H) mg/dL      Glucose 149 (H) mg/dL      Calcium 7 4 (L) mg/dL       (H) U/L      ALT 76 U/L      Alkaline Phosphatase 198 (H) U/L      Total Protein 5 9 (L) g/dL      Albumin 2 5 (L) g/dL      Total Bilirubin 0 33 mg/dL      eGFR 20 ml/min/1 73sq m     Narrative:         National Kidney Disease Education Program recommendations are as follows:  GFR calculation is accurate only with a steady state creatinine  Chronic Kidney disease less than 60 ml/min/1 73 sq  meters  Kidney failure less than 15 ml/min/1 73 sq  meters      TSH, 3rd generation [[id number]]  (Normal) Collected:  05/15/18 1304    Lab Status:  Final result Specimen:  Blood from Arm, Right Updated:  05/15/18 1403     TSH 3RD GENERATON 2 380 uIU/mL     Narrative:         Patients undergoing fluorescein dye angiography may retain small amounts of fluorescein in the body for 48-72 hours post procedure  Samples containing fluorescein can produce falsely depressed TSH values  If the patient had this procedure,a specimen should be resubmitted post fluorescein clearance  Acetone [[id number]]  (Normal) Collected:  05/15/18 1304    Lab Status:  Final result Specimen:  Blood from Arm, Right Updated:  05/15/18 1403     Acetone, Bld Negative    Troponin I [[id number]]  (Abnormal) Collected:  05/15/18 1304    Lab Status:  Final result Specimen:  Blood from Arm, Right Updated:  05/15/18 1343     Troponin I 0 47 (H) ng/mL     Narrative:         [employer] Chemistry analyzer 99% cutoff is > 0 04 ng/mL in network labs    o cTnI 99% cutoff is useful only when applied to patients in the clinical setting of myocardial ischemia  o cTnI 99% cutoff should be interpreted in the context of clinical history, ECG findings and possibly cardiac imaging to establish correct diagnosis  o cTnI 99% cutoff may be suggestive but clearly not indicative of a coronary event without the clinical setting of myocardial ischemia      APTT [[id number]]  (Normal) Collected:  05/15/18 1305    Lab Status:  Final result Specimen:  Blood from Arm, Right Updated:  05/15/18 1330     PTT 27 seconds     Protime-INR [[id number]]  (Normal) Collected:  05/15/18 1305    Lab Status:  Final result Specimen:  Blood from Arm, Right Updated:  05/15/18 1330     Protime 13 7 seconds      INR 1 05    POCT Blood Gas (CG8+) [[id number]]  (Abnormal) Collected:  05/15/18 1313    Lab Status:  Final result Updated:  05/15/18 1317     ph, Ravindra ISTAT 7 283 (L)     pCO2, Ravindra i-STAT 53 7 (H) mm HG      pO2, Ravindra i-STAT 47 0 (H) mm HG      BE, i-STAT -2 mmol/L      HCO3, Ravindra i-STAT 25 4 mmol/L      CO2, i-STAT 27 mmol/L      O2 Sat, i-STAT 77 (L) %      SODIUM, I-STAT 133 (L) mmol/l      Potassium, i-STAT 2 6 (L) mmol/L      Calcium, Ionized i-STAT 0 87 (L) mmol/L      Hct, i-STAT 37 % Hgb, i-STAT 12 6 g/dl      Glucose, i-STAT 145 (H) mg/dl      Specimen Type VENOUS    Blood gas, venous [[id number]]  (Abnormal) Collected:  05/15/18 1304    Lab Status:  Final result Specimen:  Blood from Arm, Right Updated:  05/15/18 1313     pH, Ravindra 7 271 (L)     pCO2, Ravindra 59 3 (H) mm Hg      pO2, Ravindra 50 5 (H) mm Hg      HCO3, Ravindra 26 7 mmol/L      Base Excess, Ravindra -1 5 mmol/L      O2 Content, Ravindra 15 9 ml/dL      O2 HGB, VENOUS 75 7 %                  XR chest portable   Final Result by Grace Granger MD (05/18 4515)      No acute cardiopulmonary disease  Workstation performed: [id number]         XR chest portable   Final Result by Yair Cowan MD (05/16 2572)      Very small left effusion with bibasilar subsegmental atelectasis            Workstation performed: [id number]         CT chest abdomen pelvis wo contrast   Final Result by Angelia Kaufman MD (05/15 0551)   1  Although exam is limited without intravenous contrast, there appears to be wall thickening centered around the sutures in the hepatic flexure of the colon, with associated inflammatory change and infiltration of the adjacent fat  There may be some    involvement of the adjacent descending duodenum  Findings may be related to the history of Crohn's disease/ulcerative colitis  Occult tumor not excluded at this time  Follow-up to resolution recommended  2   Subtle increased ground glass and nodular densities right lung apex may be inflammatory/infectious  CT follow-up in 3-6 months may be considered to ensure resolution  Additional tiny nodular densities bilaterally appear similar  3   Nonobstructing renal calculi  Workstation performed: [id number]         XR chest portable   ED Interpretation by James Davila MD (05/15 7207)   No evidence of pneumothorax, pleural effusion, pulmonary edema      Final Result by Chucho Beach MD (05/15 1410)   No acute cardiopulmonary disease  Findings are stable  Findings are consistent with emergency provider's preliminary reading               Workstation performed: [id number]         XR chest portable    (Results Pending)         Procedures  Procedures      Phone Consults  ED Phone Contact    ED Course  ED Course as of May 18 1739   Tue May 15, 2018   1313 pCO2, Ravindra: (!) 59 3   1325 POTASSIUM,I-STAT: (!) 2 6   1325 Glucose, i-STAT: (!) 145   1344 Troponin I: (!) 0 47   1407 Anion Gap: (!) 16   1412 LACTIC ACID: (!!) 5 8                         Initial Sepsis Screening     Row Name 05/15/18 1347                Is the patient's history suggestive of a new or worsening infection? No  -KB        Suspected source of infection          Are two or more of the following signs & symptoms of infection both present and new to the patient?         Indicate SIRS criteria          If the answer is yes to both questions, suspicion of sepsis is present          If severe sepsis is present AND tissue hypoperfusion perists in the hour after fluid resuscitation or lactate > 4, the patient meets criteria for SEPTIC SHOCK          Are any of the following organ dysfunction criteria present within 6 hours of suspected infection and SIRS criteria that are NOT considered to be chronic conditions?         Organ dysfunction          Date of presentation of severe sepsis          Time of presentation of severe sepsis          Tissue hypoperfusion persists in the hour after crystalloid fluid administration, evidenced, by either:          Was hypotension present within one hour of the conclusion of crystalloid fluid administration?           Date of presentation of septic shock          Time of presentation of septic shock            User Key  (r) = Recorded By, (t) = Taken By, (c) = Cosigned By    [address] Name Provider Type    KB Rigo Lopez MD Resident           Default Flowsheet Data (last 720 hours)      Sepsis Reassessment     Row Name 05/15/18 1518                   Volume Status and Tissue Perfusion Post Fluid Resuscitation- Must Document ALL of the Following:    Vital Signs Reviewed Yes  -KB        Cardio (!)  Normal S1/S2; Tachycardia  -KB        Pulmonary (!)  Wheezes  -KB        Capillary Refill Sluggish  -KB        Peripheral Pulses Radial  -KB        Peripheral Pulse +1  -KB        Skin Normal  -KB           *OR*   Intensive Monitoring- Must Document Two * of the Following Four *:    Vital Signs Reviewed          * Central Venous Pressure (CVP or RAP)          * Central Venous Oxygen (SVO2, ScvO2 or Oxygen saturation via central catheter)          * Bedside Cardiovascular US in IVC diameter and % collapse          * Passive Leg Raise OR Crystalloid Challenge            User Key  (r) = Recorded By, (t) = Taken By, (c) = Cosigned By    Initials Name Provider Type    Luis Alfredo Campbell MD Resident          Amina Virk' Criteria for PE      Most Recent Value   Wells' Criteria for PE   Clinical signs and symptoms of DVT  0 Filed at: 05/15/2018 1303   PE is primary diagnosis or equally likely  0 Filed at: 05/15/2018 1303   HR >100  1 5 Filed at: 05/15/2018 1303   Immobilization at least 3 days or Surgery in the previous 4 weeks  1 5 Filed at: 05/15/2018 1303   Previous, objectively diagnosed PE or DVT  0 Filed at: 05/15/2018 1303   Hemoptysis  0 Filed at: 05/15/2018 1303   Malignancy with treatment within 6 months or palliative  0 Filed at: 05/15/2018 1303   Wells' Criteria Total  3 Filed at: 05/15/2018 1303            MDM  Number of Diagnoses or Management Options  Diagnosis management comments: 56year old male presents with sob  I will order cbc, cmp, lactic acid, urinalysis, lactic acid, blood cultures, ekg, troponin, chest xray  Fluids   Reassess     CritCare Time    Disposition  Final diagnoses:   Colitis     Time reflects when diagnosis was documented in both MDM as applicable and the Disposition within this note     Time User Action Codes Description Comment    5/15/2018  4:47 PM Nazaningraciela García Add [K52 9] Colitis       ED Disposition     None      Follow-up Information    None       Current Discharge Medication List      START taking these medications    Details   VENTOLIN  (90 Base) MCG/ACT inhaler Inhale 2 puffs every 4 (four) hours as needed for wheezing or shortness of breath  Qty: 1 Inhaler, Refills: 6    Associated Diagnoses: COPD (chronic obstructive pulmonary disease) ([address] )         CONTINUE these medications which have NOT CHANGED    Details   albuterol (2 5 mg/3 mL) 0 083 % nebulizer solution USE 1 UNIT DOSE EVERY 4-6 HOURS AS NEEDED FOR WHEEZING   Qty: 150 mL, Refills: 4    Associated Diagnoses: COPD (chronic obstructive pulmonary disease) ([facility])      atorvastatin (LIPITOR) 40 mg tablet Take 1 tablet by mouth daily with dinner for 30 days  Qty: 30 tablet, Refills: 0      gabapentin (NEURONTIN) 300 mg capsule Take 1 capsule (300 mg total) by mouth 3 (three) times a day  Qty: 90 capsule, Refills: 0    Associated Diagnoses: Cervical radiculopathy      lisinopril (ZESTRIL) 2 5 mg tablet Take 1 tablet (2 5 mg total) by mouth daily for 90 days  Qty: 90 tablet, Refills: 0    Associated Diagnoses: Essential hypertension      metoprolol tartrate (LOPRESSOR) 25 mg tablet Take 0 5 tablets (12 5 mg total) by mouth every 12 (twelve) hours for 30 days  Qty: 30 tablet, Refills: 0    Associated Diagnoses: Essential hypertension      Mometasone Furo-Formoterol Fum (DULERA) 200-5 MCG/ACT AERO Inhale 2 puffs every 12 (twelve) hours  Qty: 1 Inhaler, Refills: 3    Associated Diagnoses: Asthma, unspecified asthma severity, unspecified whether complicated, unspecified whether persistent      !! Tiotropium Bromide Monohydrate (SPIRIVA RESPIMAT) 2 5 MCG/ACT AERS Inhale 2 puffs daily      !!  Tiotropium Bromide Monohydrate (SPIRIVA RESPIMAT) 2 5 MCG/ACT AERS Inhale 1 Act (2 5 mcg total) daily for 90 days  Qty: 1 Inhaler, Refills: 3    Associated Diagnoses: Chronic obstructive pulmonary disease, unspecified COPD type [facility])       ! ! - Potential duplicate medications found  Please discuss with provider  No discharge procedures on file  ED Provider  Attending physically available and evaluated Phil Orozco I managed the patient along with the ED Attending      Electronically Signed by         Abigail Fortune MD  05/18/18 3421

## 2018-05-15 NOTE — RESPIRATORY THERAPY NOTE
RT Protocol Note  Sherrell Aguilar 56 y o  male MRN: [id number]  Unit/Bed#: ED 03 Encounter: [id number]    Assessment    Principal Problem:    Shock [facility])  Active Problems:    Colitis    Coronary artery disease involving native coronary artery of native heart    COPD with exacerbation (HCC)    Lactic acidosis    High anion gap metabolic acidosis    Acute kidney failure (HCC)    Transaminitis    Elevated troponin    Hypokalemia    Hyponatremia    Alcohol abuse    Tobacco abuse      Home Pulmonary Medications:  Albuterol PRN, Lipscomb Harries       Past Medical History:   Diagnosis Date    Asthma     Cardiac disease     Colitis     Colon polyps     COPD [facility]chronic obstructive [facility] disease) ([facility] )     Coronary artery disease     Diverticulitis     Hyperlipidemia     Hypertension     Myocardial infarction ([facility] )     Ulcerative colitis ([facility] )      Social History     Social History    Marital status:      Spouse name: N/A    Number of children: N/A    Years of education: N/A     Social History Main Topics    Smoking status: Current Every Day Smoker     Types: Cigars    Smokeless tobacco: Never Used    Alcohol use Yes      Comment: kenji occassionally    Drug use: No    Sexual activity: Not Asked     Other Topics Concern    None     Social History Narrative    None       Subjective         Objective    Physical Exam:   Assessment Type: (P) Pre-treatment  General Appearance: (P) Alert, Awake  Respiratory Pattern: (P) Normal  Chest Assessment: (P) Chest expansion symmetrical  Bilateral Breath Sounds: (P) Expiratory wheezes    Vitals:  Blood pressure 99/56, pulse (!) 109, temperature 98 3 °F (36 8 °C), temperature source Oral, resp  rate 18, height 5' 6" (1 676 m), weight 65 8 kg (145 lb), SpO2 91 %  Imaging and other studies: I have personally reviewed pertinent reports              Plan    Respiratory Plan: (P) Home Bronchodilator Patient pathway        Resp Comments: (P) Pt uses UDNs with albuterol PRN at home  He also takes spireva  Pt having wheezing at this time UDNs changed from QID to TID per our protocal  Pt will recieve atrovent via UDN since no spireva was ordered

## 2018-05-15 NOTE — ED NOTES
Patient transported to CT with RN, tech, O2, and monitor     [facility]  05/15/18 5864

## 2018-05-15 NOTE — ED NOTES
Called MICU, RN unavailable to take report at this time     Huey Chris, Critical access hospital[facility]  05/15/18 8321

## 2018-05-15 NOTE — H&P
History and Physical - Critical Care   Madelin Matta 56 y o  male MRN: [id number]  Unit/Bed#: ED 03 Encounter: [id number]    Reason for Admission / Chief Complaint: Malaise    History of Present Illness:  Madelin Matta is a 56 y o  male who presents on 05/15 from home with complaint of malaise and weakness as well as incontinence of stool/urine due to inability to get off his couch  He has a past medical history significant for COPD, hypertension, coronary artery disease status post PCI to RCA in 2012, history of a right hemicolectomy secondary to unknown reason, alcohol abuse, tobacco abuse and ulcerative colitis/Crohn's  Per the patient he has had several episodes of emesis daily for at least the past month  He denies any increasing diarrhea or nausea, reports shortness of breath is at baseline, denies any fevers, chest pain, any difficulties voiding and reports urinating 3 to 4 times a day, he does endorse decreased appetite eating approximately 2 meals a day, he reports smoking cigars for 3-4 hours daily, drinking at least 1 bottle of kenji a day  In regards to his alcohol abuse he states that he has not stopped drinking for any significant amount of time and is unclear regarding withdrawal symptoms or seizures  In the emergency room he was significantly short of breath and received an extended nebulizer treatment with good effect  He was profoundly hypotensive and was resuscitated with 3 L of crystalloid, and ultimately started on norepinephrine  He had an acute kidney injury, significant metabolic derangements, an elevated troponin, and a lactic acidosis  For this reason he is being referred to the critical care unit for admission  History obtained from chart review and the patient      Past Medical History:  Past Medical History:   Diagnosis Date    Asthma     Cardiac disease     Colitis     Colon polyps     [facility]     Coronary artery disease  Diverticulitis     Hyperlipidemia     Hypertension     [facility]     Ulcerative colitis ([facility] )        Past Surgical History:  Past Surgical History:   Procedure Laterality Date    ANGIOPLASTY      COLON SURGERY      COLONOSCOPY N/A 10/24/2016    Procedure: COLONOSCOPY;  Surgeon: Salvador French MD;  Location: [facility]; Service:     CORONARY ANGIOPLASTY WITH STENT PLACEMENT      ESOPHAGOGASTRODUODENOSCOPY N/A 10/24/2016    Procedure: ESOPHAGOGASTRODUODENOSCOPY (EGD); Surgeon: Salvador French MD;  Location: [facility]; Service:     TX COLONOSCOPY FLX DX W/COLLJ [facility] WHEN PFRMD N/A 2/6/2018    Procedure: COLONOSCOPY;  Surgeon: Salvador French MD;  Location: [facility]; Service: Colorectal    TONSILLECTOMY         Past Family History:  History reviewed  No pertinent family history  Social History:  History   Smoking Status    Current Every Day Smoker    Types: Cigars   Smokeless Tobacco    Never Used     History   Alcohol Use    Yes     Comment: kenji occassionally     History   Drug Use No     Marital Status:   Exercise History:  Patient reports being independent with ADLs    Medications:  Current Facility-Administered Medications   Medication Dose Route Frequency    norepinephrine (LEVOPHED) 4 mg (STANDARD CONCENTRATION) IV in sodium chloride 0 9% 250 mL  1-30 mcg/min Intravenous Titrated    vancomycin (VANCOCIN) IVPB (premix) 1,000 mg  15 mg/kg Intravenous Once     Home medications:  Prior to Admission medications    Medication Sig Start Date End Date Taking?  Authorizing Provider   albuterol (2 5 mg/3 mL) 0 083 % nebulizer solution USE 1 UNIT DOSE EVERY 4-6 HOURS AS NEEDED FOR WHEEZING   3/26/18   Rain Pond DO   atorvastatin (LIPITOR) 40 mg tablet Take 1 tablet by mouth daily with dinner for 30 days 10/26/16 12/5/16  Vilma Juárez MD   gabapentin (NEURONTIN) 300 mg capsule Take 1 capsule (300 mg total) by mouth 3 (three) times a day 2/3/18   Hanny Urias Bryant Olmos MD   lisinopril (ZESTRIL) 2 5 mg tablet Take 1 tablet (2 5 mg total) by mouth daily for 90 days 2/7/18 5/8/18  Michelle Mendez MD   metoprolol tartrate (LOPRESSOR) 25 mg tablet Take 0 5 tablets (12 5 mg total) by mouth every 12 (twelve) hours for 30 days 2/3/18 3/5/18  Michelle Mendez MD   Mometasone Furo-Formoterol Fum [facility]) 200-5 MCG/ACT AERO Inhale 2 puffs every 12 (twelve) hours 2/7/18   Michelle Mendez MD   Tiotropium Bromide Monohydrate (SPIRIVA RESPIMAT) 2 5 MCG/ACT AERS Inhale 2 puffs daily 7/21/15   Historical Provider, MD   Tiotropium Bromide Monohydrate (SPIRIVA RESPIMAT) 2 5 MCG/ACT AERS Inhale 1 Act (2 5 mcg total) daily for 90 days 3/29/18 6/27/18  Hugh Presser, DO   VENTOLIN  (90 Base) MCG/ACT inhaler  3/19/18   Historical Provider, MD   aspirin (ECOTRIN) 325 mg EC tablet Take 2 tablets by mouth 3 (three) times a day with meals for 14 days  Patient taking differently: Take 81 mg by mouth daily   12/8/16 5/15/18  Edith Malhotra MD   mesalamine (PENTASA) 500 mg CR capsule Take 2 capsules by mouth 4 (four) times a day for 30 days 10/26/16 5/15/18  Edel Sebastian MD     Allergies: Allergies   Allergen Reactions    Other      Brown cloth band aids          ROS:   Review of Systems   Constitutional: Positive for activity change (Significantly decreased endurance), appetite change ( reduced) and fatigue  Negative for chills, diaphoresis, fever and unexpected weight change  HENT: Negative for trouble swallowing  Eyes: Negative for photophobia and visual disturbance  Respiratory: Positive for shortness of breath ( chronic-at baseline)  Negative for cough, choking and chest tightness  Cardiovascular: Negative for chest pain, palpitations and leg swelling  Gastrointestinal: Positive for vomiting  Negative for abdominal distention, abdominal pain, blood in stool, constipation, diarrhea and nausea  Endocrine: Negative  Genitourinary: Negative      Musculoskeletal: Positive for arthralgias and back pain  Skin: Positive for color change ( erythematous groin)  Negative for wound  Neurological: Positive for tremors and weakness  Hematological: Negative  Psychiatric/Behavioral: Positive for agitation  Vitals:  Vitals:    05/15/18 1510 05/15/18 1545 05/15/18 1552 05/15/18 1600   BP: (!) 80/51 90/52 102/63 104/56   BP Location: Left arm Left arm Left arm Left arm   Pulse: (!) 117 (!) 110 (!) 112 (!) 108   Resp: 22 22 22 (!) 24   Temp:       TempSrc:       SpO2: (!) 82% 94% 94% 93%   Weight:       Height:         Temperature:   Temp (24hrs), Av 3 °F (36 8 °C), Min:98 3 °F (36 8 °C), Max:98 3 °F (36 8 °C)    Current Temperature: 98 3 °F (36 8 °C)    Weights:   IBW: 63 8 kg  Body mass index is 23 4 kg/m²  Hemodynamic Monitoring:  N/A     Non-Invasive/Invasive Ventilation Settings:  Respiratory    Lab Data (Last 4 hours)    None         O2/Vent Data (Last 4 hours)    None              No results found for: PHART, VBW2QFJ, PO2ART, TSN8QID, N1XWGEYD, BEART, SOURCE  SpO2: SpO2: 93 %, SpO2 Activity: SpO2 Activity: At Rest, SpO2 Device: O2 Device: Nasal cannula     Physical Exam:  Physical Exam   Constitutional: He is oriented to person, place, and time  He appears listless  He appears cachectic  He appears ill  He appears distressed  Nasal cannula in place  HENT:   Head: Normocephalic and atraumatic  Eyes: Conjunctivae are normal  Pupils are equal, round, and reactive to light  Neck: Normal range of motion  Neck supple  No JVD present  No tracheal deviation present  Cardiovascular: Normal rate, regular rhythm and intact distal pulses  Pulmonary/Chest: He has rhonchi  Abdominal: Soft  He exhibits distension  There is tenderness (bilateral upper quadrants) in the right upper quadrant and left upper quadrant  There is no rigidity, no rebound and no guarding     Genitourinary:   Genitourinary Comments: Excoriation in peritoneum without fluctuance Musculoskeletal: He exhibits no edema  Neurological: He is oriented to person, place, and time  He appears listless  He displays tremor  No cranial nerve deficit or sensory deficit  GCS eye subscore is 4  GCS verbal subscore is 5  GCS motor subscore is 6  Skin: Skin is warm and dry  He is not diaphoretic  Psychiatric: His mood appears anxious  Labs:    Results from last 7 days  Lab Units 05/15/18  1313 05/15/18  1304   WBC Thousand/uL  --  10 21*   HEMOGLOBIN g/dL  --  14 0   I STAT HEMOGLOBIN g/dl 12 6  --    HEMATOCRIT %  --  39 5   PLATELETS Thousands/uL  --  61*   NEUTROS PCT %  --  83*   MONOS PCT %  --  9       Results from last 7 days  Lab Units 05/15/18  1313 05/15/18  1304   SODIUM mmol/L  --  131*   POTASSIUM mmol/L  --  2 8*   CHLORIDE mmol/L  --  90*   CO2 mmol/L  --  25   BUN mg/dL  --  23   CREATININE mg/dL  --  3 33*   CALCIUM mg/dL  --  7 4*   ANION GAP mmol/L  --  16*   ALBUMIN g/dL  --  2 5*   BILIRUBIN TOTAL mg/dL  --  0 33   ALK PHOS U/L  --  198*   ALT U/L  --  76   AST U/L  --  194*   GLUCOSE RANDOM mg/dL  --  149*   GLUCOSE, ISTAT mg/dl 145*  --             Results from last 7 days  Lab Units 05/15/18  1305   INR  1 05   PTT seconds 27       Results from last 7 days  Lab Units 05/15/18  1304   TROPONIN I ng/mL 0 47*       Results from last 7 days  Lab Units 05/15/18  1327   LACTIC ACID mmol/L 5 8*     ABG:No results found for: PHART, ZXQ0RWK, PO2ART, QVS8GVU, M3MANNWY, BEART, SOURCE  VBG:  Results from last 7 days  Lab Units 05/15/18  1550   PH SANAZ  7 260*   PCO2 SANAZ mm Hg 55 9*   PO2 SANAZ mm Hg 56 3*   HCO3 SANAZ mmol/L 24 5   BASE EXC SANAZ mmol/L -3 4       Imagin/15 CXR- No acute cardiopulmonary disease I have personally reviewed pertinent reports  and I have personally reviewed pertinent films in PACS  EKG: Sinus rhythm This was personally reviewed by myself     Micro:  No results found for: Merlin Pulse, Omid Paige    ______________________________________________________________________    Assessment:   1  Shock, presumed sepsis  2  Lactic acidosis  3  High anion gap metabolic acidosis  4  Acute kidney injury  5  Transaminitis  6  Elevated troponin  7  COPD with exacerbation  8  Hypokalemia  9  Hyponatremia  10  Alcohol abuse with potential for withdrawal  11  Tobacco abuse  12  Coronary artery disease  13  Crohns/ulcerative colitis    Plan:      Neuro: Frequent neurologic monitoring, institute CIWA     CV: Close hemodynamic monitoring, trend troponins, would favor repeating an ECHO given history of CAD     Pulm: Encourage good pulmonary hygiene, smoking cessation, provide nicotine patch, start steroids and nebulizers     GI: Serial abdominal exams, trend LFTs, send c diff, hold mesalamine      : Close intake and output, daily weights, trend serum creatinine     F/E/N: Continue fluid resuscitation, replete electrolytes aggressively, NPO for now     ID: Continue vancomycin and cefepime, add Flagyl, follow culture results/temperature/white count     Heme: Begin heparin for DVT prophylaxis     Endo: Follow blood sugars as needed     Msk/Skin: Frequent turning and repositioning, out of bed as tolerated     Disposition: ICU level care    Counseling / Coordination of Care  Total Critical Care time spent 41 minutes excluding procedures, teaching and family updates  ______________________________________________________________________    VTE Pharmacologic Prophylaxis: Heparin  VTE Mechanical Prophylaxis: sequential compression device    Invasive lines and devices:   Invasive Devices     Peripheral Intravenous Line            Peripheral IV 05/15/18 Left Antecubital less than 1 day    Peripheral IV 05/15/18 Left Hand less than 1 day    Peripheral IV 05/15/18 Right Antecubital less than 1 day                Code Status: Prior  POA:    POLST:      Given critical illness, patient length of stay will require greater than two midnights  Portions of the record may have been created with voice recognition software  Occasional wrong word or "sound a like" substitutions may have occurred due to the inherent limitations of voice recognition software  Read the chart carefully and recognize, using context, where substitutions have occurred        AMALIA Cisse

## 2018-05-16 ENCOUNTER — APPOINTMENT (INPATIENT)
Dept: RADIOLOGY | Facility: HOSPITAL | Age: 58
DRG: 710 | End: 2018-05-16
Payer: COMMERCIAL

## 2018-05-16 LAB
ALBUMIN SERPL BCP-MCNC: 2.8 G/DL (ref 3.5–5)
ALP SERPL-CCNC: 190 U/L (ref 46–116)
ALT SERPL W P-5'-P-CCNC: 71 U/L (ref 12–78)
AMMONIA PLAS-SCNC: 44 UMOL/L (ref 11–35)
ANION GAP SERPL CALCULATED.3IONS-SCNC: 11 MMOL/L (ref 4–13)
ANISOCYTOSIS BLD QL SMEAR: PRESENT
ARTERIAL PATENCY WRIST A: YES
AST SERPL W P-5'-P-CCNC: 166 U/L (ref 5–45)
ATRIAL RATE: 145 BPM
BASE EXCESS BLDA CALC-SCNC: -0.8 MMOL/L
BASE EXCESS BLDA CALC-SCNC: -1.8 MMOL/L
BASE EXCESS BLDA CALC-SCNC: -4.9 MMOL/L
BASE EXCESS BLDA CALC-SCNC: 0.4 MMOL/L
BASOPHILS # BLD MANUAL: 0 THOUSAND/UL (ref 0–0.1)
BASOPHILS NFR MAR MANUAL: 0 % (ref 0–1)
BILIRUB SERPL-MCNC: 0.64 MG/DL (ref 0.2–1)
BLASTS NFR BLD MANUAL: 0 %
BUN SERPL-MCNC: 18 MG/DL (ref 5–25)
CALCIUM SERPL-MCNC: 6.3 MG/DL (ref 8.3–10.1)
CHLORIDE SERPL-SCNC: 97 MMOL/L (ref 100–108)
CO2 SERPL-SCNC: 25 MMOL/L (ref 21–32)
CREAT SERPL-MCNC: 1.8 MG/DL (ref 0.6–1.3)
EOSINOPHIL # BLD MANUAL: 0 THOUSAND/UL (ref 0–0.4)
EOSINOPHIL NFR BLD MANUAL: 0 % (ref 0–6)
ERYTHROCYTE [DISTWIDTH] IN BLOOD BY AUTOMATED COUNT: 15.8 % (ref 11.6–15.1)
GFR SERPL CREATININE-BSD FRML MDRD: 41 ML/MIN/1.73SQ M
GLUCOSE SERPL-MCNC: 191 MG/DL (ref 65–140)
GLUCOSE SERPL-MCNC: 198 MG/DL (ref 65–140)
GLUCOSE SERPL-MCNC: 198 MG/DL (ref 65–140)
HCO3 BLDA-SCNC: 24.7 MMOL/L (ref 22–28)
HCO3 BLDA-SCNC: 25.4 MMOL/L (ref 22–28)
HCO3 BLDA-SCNC: 25.6 MMOL/L (ref 22–28)
HCO3 BLDA-SCNC: 27.2 MMOL/L (ref 22–28)
HCT VFR BLD AUTO: 36.3 % (ref 36.5–49.3)
HGB BLD-MCNC: 12.4 G/DL (ref 12–17)
IPAP: 14
LACTATE SERPL-SCNC: 1.6 MMOL/L (ref 0.5–2)
LACTATE SERPL-SCNC: 2.8 MMOL/L (ref 0.5–2)
LACTATE SERPL-SCNC: 3.7 MMOL/L (ref 0.5–2)
LACTATE SERPL-SCNC: 3.8 MMOL/L (ref 0.5–2)
LYMPHOCYTES # BLD AUTO: 0 % (ref 14–44)
LYMPHOCYTES # BLD AUTO: 0 THOUSAND/UL (ref 0.6–4.47)
MAGNESIUM SERPL-MCNC: 1.7 MG/DL (ref 1.6–2.6)
MCH RBC QN AUTO: 33 PG (ref 26.8–34.3)
MCHC RBC AUTO-ENTMCNC: 34.2 G/DL (ref 31.4–37.4)
MCV RBC AUTO: 97 FL (ref 82–98)
METAMYELOCYTES NFR BLD MANUAL: 0 % (ref 0–1)
MONOCYTES # BLD AUTO: 0 THOUSAND/UL (ref 0–1.22)
MONOCYTES NFR BLD: 0 % (ref 4–12)
MYELOCYTES NFR BLD MANUAL: 0 % (ref 0–1)
NASAL CANNULA: 4
NEUTROPHILS # BLD MANUAL: 13.97 THOUSAND/UL (ref 1.85–7.62)
NEUTS BAND NFR BLD MANUAL: 0 % (ref 0–8)
NEUTS SEG NFR BLD AUTO: 100 % (ref 43–75)
NON VENT- BIPAP: ABNORMAL
NRBC BLD AUTO-RTO: 0 /100 WBCS
O2 CT BLDA-SCNC: 15.4 ML/DL (ref 16–23)
O2 CT BLDA-SCNC: 15.4 ML/DL (ref 16–23)
O2 CT BLDA-SCNC: 15.6 ML/DL (ref 16–23)
O2 CT BLDA-SCNC: 16.3 ML/DL (ref 16–23)
OXYHGB MFR BLDA: 92.9 % (ref 94–97)
OXYHGB MFR BLDA: 94.2 % (ref 94–97)
OXYHGB MFR BLDA: 95.7 % (ref 94–97)
OXYHGB MFR BLDA: 95.9 % (ref 94–97)
P AXIS: 84 DEGREES
PCO2 BLDA: 48.4 MM HG (ref 36–44)
PCO2 BLDA: 54.1 MM HG (ref 36–44)
PCO2 BLDA: 56.1 MM HG (ref 36–44)
PCO2 BLDA: 68.7 MM HG (ref 36–44)
PEEP MAX SETTING VENT: 5 CM[H2O]
PH BLDA: 7.17 [PH] (ref 7.35–7.45)
PH BLDA: 7.28 [PH] (ref 7.35–7.45)
PH BLDA: 7.32 [PH] (ref 7.35–7.45)
PH BLDA: 7.34 [PH] (ref 7.35–7.45)
PHOSPHATE SERPL-MCNC: 3.5 MG/DL (ref 2.7–4.5)
PLASMA CELLS NFR BLD: 0 % (ref 0–0)
PLATELET # BLD AUTO: 78 THOUSANDS/UL (ref 149–390)
PLATELET BLD QL SMEAR: ABNORMAL
PMV BLD AUTO: 10.6 FL (ref 8.9–12.7)
PO2 BLDA: 80.6 MM HG (ref 75–129)
PO2 BLDA: 82.2 MM HG (ref 75–129)
PO2 BLDA: 89.4 MM HG (ref 75–129)
PO2 BLDA: 92.1 MM HG (ref 75–129)
POTASSIUM SERPL-SCNC: 4.4 MMOL/L (ref 3.5–5.3)
PR INTERVAL: 242 MS
PROMYELOCYTES NFR BLD MANUAL: 0 % (ref 0–0)
PROT SERPL-MCNC: 6.4 G/DL (ref 6.4–8.2)
QRS AXIS: 239 DEGREES
QRSD INTERVAL: 92 MS
QT INTERVAL: 279 MS
QTC INTERVAL: 434 MS
RBC # BLD AUTO: 3.76 MILLION/UL (ref 3.88–5.62)
RBC MORPH BLD: PRESENT
SODIUM SERPL-SCNC: 133 MMOL/L (ref 136–145)
SPECIMEN SOURCE: ABNORMAL
T WAVE AXIS: 84 DEGREES
TROPONIN I SERPL-MCNC: 1.09 NG/ML
TROPONIN I SERPL-MCNC: 1.15 NG/ML
TROPONIN I SERPL-MCNC: 1.16 NG/ML
TROPONIN I SERPL-MCNC: 1.24 NG/ML
TSH SERPL DL<=0.05 MIU/L-ACNC: 0.62 UIU/ML (ref 0.36–3.74)
UNIDENT CELLS # BLD: 0 % (ref 0–0)
VARIANT LYMPHS # BLD AUTO: 0 %
VENT BIPAP FIO2: 40 %
VENTRICULAR RATE: 145 BPM
WBC # BLD AUTO: 13.97 THOUSAND/UL (ref 4.31–10.16)

## 2018-05-16 PROCEDURE — 82140 ASSAY OF AMMONIA: CPT | Performed by: STUDENT IN AN ORGANIZED HEALTH CARE EDUCATION/TRAINING PROGRAM

## 2018-05-16 PROCEDURE — 82805 BLOOD GASES W/O2 SATURATION: CPT | Performed by: INTERNAL MEDICINE

## 2018-05-16 PROCEDURE — 85007 BL SMEAR W/DIFF WBC COUNT: CPT | Performed by: NURSE PRACTITIONER

## 2018-05-16 PROCEDURE — 82805 BLOOD GASES W/O2 SATURATION: CPT | Performed by: STUDENT IN AN ORGANIZED HEALTH CARE EDUCATION/TRAINING PROGRAM

## 2018-05-16 PROCEDURE — 93005 ELECTROCARDIOGRAM TRACING: CPT

## 2018-05-16 PROCEDURE — 83605 ASSAY OF LACTIC ACID: CPT | Performed by: FAMILY MEDICINE

## 2018-05-16 PROCEDURE — 84484 ASSAY OF TROPONIN QUANT: CPT | Performed by: EMERGENCY MEDICINE

## 2018-05-16 PROCEDURE — 82948 REAGENT STRIP/BLOOD GLUCOSE: CPT

## 2018-05-16 PROCEDURE — 94760 N-INVAS EAR/PLS OXIMETRY 1: CPT

## 2018-05-16 PROCEDURE — 94640 AIRWAY INHALATION TREATMENT: CPT

## 2018-05-16 PROCEDURE — 83605 ASSAY OF LACTIC ACID: CPT | Performed by: EMERGENCY MEDICINE

## 2018-05-16 PROCEDURE — 71045 X-RAY EXAM CHEST 1 VIEW: CPT

## 2018-05-16 PROCEDURE — 36600 WITHDRAWAL OF ARTERIAL BLOOD: CPT

## 2018-05-16 PROCEDURE — 99233 SBSQ HOSP IP/OBS HIGH 50: CPT | Performed by: INTERNAL MEDICINE

## 2018-05-16 PROCEDURE — 82805 BLOOD GASES W/O2 SATURATION: CPT | Performed by: PHYSICIAN ASSISTANT

## 2018-05-16 PROCEDURE — 84484 ASSAY OF TROPONIN QUANT: CPT | Performed by: FAMILY MEDICINE

## 2018-05-16 PROCEDURE — 94660 CPAP INITIATION&MGMT: CPT

## 2018-05-16 PROCEDURE — 84443 ASSAY THYROID STIM HORMONE: CPT | Performed by: FAMILY MEDICINE

## 2018-05-16 PROCEDURE — 93010 ELECTROCARDIOGRAM REPORT: CPT | Performed by: INTERNAL MEDICINE

## 2018-05-16 PROCEDURE — 80053 COMPREHEN METABOLIC PANEL: CPT | Performed by: NURSE PRACTITIONER

## 2018-05-16 PROCEDURE — 83735 ASSAY OF MAGNESIUM: CPT | Performed by: NURSE PRACTITIONER

## 2018-05-16 PROCEDURE — 84100 ASSAY OF PHOSPHORUS: CPT | Performed by: NURSE PRACTITIONER

## 2018-05-16 PROCEDURE — 82805 BLOOD GASES W/O2 SATURATION: CPT | Performed by: FAMILY MEDICINE

## 2018-05-16 PROCEDURE — 85027 COMPLETE CBC AUTOMATED: CPT | Performed by: NURSE PRACTITIONER

## 2018-05-16 RX ORDER — OXAZEPAM 15 MG/1
30 CAPSULE ORAL EVERY 6 HOURS SCHEDULED
Status: DISCONTINUED | OUTPATIENT
Start: 2018-05-16 | End: 2018-05-16

## 2018-05-16 RX ORDER — OXAZEPAM 15 MG/1
15 CAPSULE ORAL EVERY 6 HOURS SCHEDULED
Status: DISCONTINUED | OUTPATIENT
Start: 2018-05-16 | End: 2018-05-22

## 2018-05-16 RX ORDER — ALBUMIN (HUMAN) 12.5 G/50ML
SOLUTION INTRAVENOUS
Status: COMPLETED
Start: 2018-05-16 | End: 2018-05-16

## 2018-05-16 RX ORDER — DIAZEPAM 5 MG/ML
10 INJECTION, SOLUTION INTRAMUSCULAR; INTRAVENOUS ONCE
Status: COMPLETED | OUTPATIENT
Start: 2018-05-16 | End: 2018-05-16

## 2018-05-16 RX ORDER — SODIUM CHLORIDE, SODIUM GLUCONATE, SODIUM ACETATE, POTASSIUM CHLORIDE, MAGNESIUM CHLORIDE, SODIUM PHOSPHATE, DIBASIC, AND POTASSIUM PHOSPHATE .53; .5; .37; .037; .03; .012; .00082 G/100ML; G/100ML; G/100ML; G/100ML; G/100ML; G/100ML; G/100ML
1000 INJECTION, SOLUTION INTRAVENOUS ONCE
Status: COMPLETED | OUTPATIENT
Start: 2018-05-16 | End: 2018-05-16

## 2018-05-16 RX ORDER — MAGNESIUM SULFATE HEPTAHYDRATE 40 MG/ML
4 INJECTION, SOLUTION INTRAVENOUS ONCE
Status: COMPLETED | OUTPATIENT
Start: 2018-05-16 | End: 2018-05-16

## 2018-05-16 RX ORDER — LORAZEPAM 2 MG/ML
2 INJECTION INTRAMUSCULAR ONCE
Status: COMPLETED | OUTPATIENT
Start: 2018-05-16 | End: 2018-05-16

## 2018-05-16 RX ORDER — ALBUMIN, HUMAN INJ 5% 5 %
25 SOLUTION INTRAVENOUS ONCE
Status: COMPLETED | OUTPATIENT
Start: 2018-05-16 | End: 2018-05-16

## 2018-05-16 RX ORDER — DIAZEPAM 5 MG/ML
10 INJECTION, SOLUTION INTRAMUSCULAR; INTRAVENOUS ONCE
Status: DISCONTINUED | OUTPATIENT
Start: 2018-05-16 | End: 2018-05-16

## 2018-05-16 RX ADMIN — IPRATROPIUM BROMIDE 0.5 MG: 0.5 SOLUTION RESPIRATORY (INHALATION) at 07:56

## 2018-05-16 RX ADMIN — NICOTINE 14 MG: 14 PATCH, EXTENDED RELEASE TRANSDERMAL at 08:12

## 2018-05-16 RX ADMIN — SODIUM CHLORIDE, SODIUM GLUCONATE, SODIUM ACETATE, POTASSIUM CHLORIDE, MAGNESIUM CHLORIDE, SODIUM PHOSPHATE, DIBASIC, AND POTASSIUM PHOSPHATE 1000 ML: .53; .5; .37; .037; .03; .012; .00082 INJECTION, SOLUTION INTRAVENOUS at 00:32

## 2018-05-16 RX ADMIN — METRONIDAZOLE 500 MG: 500 INJECTION, SOLUTION INTRAVENOUS at 21:34

## 2018-05-16 RX ADMIN — LORAZEPAM 2 MG: 2 INJECTION INTRAMUSCULAR; INTRAVENOUS at 04:52

## 2018-05-16 RX ADMIN — DEXMEDETOMIDINE HYDROCHLORIDE 0.7 MCG/KG/HR: 100 INJECTION, SOLUTION INTRAVENOUS at 12:17

## 2018-05-16 RX ADMIN — HEPARIN SODIUM 5000 UNITS: 5000 INJECTION, SOLUTION INTRAVENOUS; SUBCUTANEOUS at 13:44

## 2018-05-16 RX ADMIN — MAGNESIUM SULFATE IN WATER 4 G: 40 INJECTION, SOLUTION INTRAVENOUS at 07:27

## 2018-05-16 RX ADMIN — METRONIDAZOLE 500 MG: 500 INJECTION, SOLUTION INTRAVENOUS at 13:00

## 2018-05-16 RX ADMIN — METHYLPREDNISOLONE SODIUM SUCCINATE 40 MG: 40 INJECTION, POWDER, FOR SOLUTION INTRAMUSCULAR; INTRAVENOUS at 21:34

## 2018-05-16 RX ADMIN — METHYLPREDNISOLONE SODIUM SUCCINATE 40 MG: 40 INJECTION, POWDER, FOR SOLUTION INTRAMUSCULAR; INTRAVENOUS at 13:00

## 2018-05-16 RX ADMIN — IPRATROPIUM BROMIDE 0.5 MG: 0.5 SOLUTION RESPIRATORY (INHALATION) at 14:24

## 2018-05-16 RX ADMIN — HEPARIN SODIUM 5000 UNITS: 5000 INJECTION, SOLUTION INTRAVENOUS; SUBCUTANEOUS at 21:34

## 2018-05-16 RX ADMIN — INSULIN LISPRO 2 UNITS: 100 INJECTION, SOLUTION INTRAVENOUS; SUBCUTANEOUS at 18:17

## 2018-05-16 RX ADMIN — CEFEPIME HYDROCHLORIDE 1000 MG: 1 INJECTION, SOLUTION INTRAVENOUS at 14:09

## 2018-05-16 RX ADMIN — DIAZEPAM 10 MG: 5 INJECTION, SOLUTION INTRAMUSCULAR; INTRAVENOUS at 03:20

## 2018-05-16 RX ADMIN — ALBUMIN HUMAN 12.5 G: 0.25 SOLUTION INTRAVENOUS at 02:04

## 2018-05-16 RX ADMIN — METHYLPREDNISOLONE SODIUM SUCCINATE 40 MG: 40 INJECTION, POWDER, FOR SOLUTION INTRAMUSCULAR; INTRAVENOUS at 07:26

## 2018-05-16 RX ADMIN — OXAZEPAM 15 MG: 15 CAPSULE, GELATIN COATED ORAL at 18:15

## 2018-05-16 RX ADMIN — LORAZEPAM 2 MG: 2 INJECTION INTRAMUSCULAR; INTRAVENOUS at 04:50

## 2018-05-16 RX ADMIN — LEVALBUTEROL HYDROCHLORIDE 1.25 MG: 1.25 SOLUTION, CONCENTRATE RESPIRATORY (INHALATION) at 19:31

## 2018-05-16 RX ADMIN — ALBUMIN HUMAN 25 G: 0.05 INJECTION, SOLUTION INTRAVENOUS at 01:35

## 2018-05-16 RX ADMIN — LEVALBUTEROL HYDROCHLORIDE 1.25 MG: 1.25 SOLUTION, CONCENTRATE RESPIRATORY (INHALATION) at 14:24

## 2018-05-16 RX ADMIN — OXAZEPAM 30 MG: 15 CAPSULE, GELATIN COATED ORAL at 08:07

## 2018-05-16 RX ADMIN — SODIUM CHLORIDE, SODIUM GLUCONATE, SODIUM ACETATE, POTASSIUM CHLORIDE, MAGNESIUM CHLORIDE, SODIUM PHOSPHATE, DIBASIC, AND POTASSIUM PHOSPHATE 1000 ML: .53; .5; .37; .037; .03; .012; .00082 INJECTION, SOLUTION INTRAVENOUS at 03:22

## 2018-05-16 RX ADMIN — HEPARIN SODIUM 5000 UNITS: 5000 INJECTION, SOLUTION INTRAVENOUS; SUBCUTANEOUS at 07:26

## 2018-05-16 RX ADMIN — DEXMEDETOMIDINE HYDROCHLORIDE 0.2 MCG/KG/HR: 100 INJECTION, SOLUTION INTRAVENOUS at 05:15

## 2018-05-16 RX ADMIN — SODIUM CHLORIDE, SODIUM GLUCONATE, SODIUM ACETATE, POTASSIUM CHLORIDE, MAGNESIUM CHLORIDE, SODIUM PHOSPHATE, DIBASIC, AND POTASSIUM PHOSPHATE 125 ML/HR: .53; .5; .37; .037; .03; .012; .00082 INJECTION, SOLUTION INTRAVENOUS at 12:17

## 2018-05-16 RX ADMIN — SODIUM CHLORIDE, SODIUM GLUCONATE, SODIUM ACETATE, POTASSIUM CHLORIDE, MAGNESIUM CHLORIDE, SODIUM PHOSPHATE, DIBASIC, AND POTASSIUM PHOSPHATE 125 ML/HR: .53; .5; .37; .037; .03; .012; .00082 INJECTION, SOLUTION INTRAVENOUS at 21:34

## 2018-05-16 RX ADMIN — LORAZEPAM 2 MG: 2 INJECTION INTRAMUSCULAR; INTRAVENOUS at 02:10

## 2018-05-16 RX ADMIN — LORAZEPAM 1 MG: 2 INJECTION INTRAMUSCULAR; INTRAVENOUS at 00:28

## 2018-05-16 RX ADMIN — INSULIN LISPRO 2 UNITS: 100 INJECTION, SOLUTION INTRAVENOUS; SUBCUTANEOUS at 12:18

## 2018-05-16 RX ADMIN — METRONIDAZOLE 500 MG: 500 INJECTION, SOLUTION INTRAVENOUS at 07:34

## 2018-05-16 RX ADMIN — DEXMEDETOMIDINE HYDROCHLORIDE 0.5 MCG/KG/HR: 100 INJECTION, SOLUTION INTRAVENOUS at 21:34

## 2018-05-16 RX ADMIN — DIAZEPAM 10 MG: 5 INJECTION, SOLUTION INTRAMUSCULAR; INTRAVENOUS at 03:51

## 2018-05-16 RX ADMIN — LEVALBUTEROL HYDROCHLORIDE 1.25 MG: 1.25 SOLUTION, CONCENTRATE RESPIRATORY (INHALATION) at 07:56

## 2018-05-16 RX ADMIN — IPRATROPIUM BROMIDE 0.5 MG: 0.5 SOLUTION RESPIRATORY (INHALATION) at 19:31

## 2018-05-16 RX ADMIN — OXAZEPAM 15 MG: 15 CAPSULE, GELATIN COATED ORAL at 13:51

## 2018-05-16 NOTE — PLAN OF CARE
Problem: Potential for Falls  Goal: Patient will remain free of falls  INTERVENTIONS:  - Assess patient frequently for physical needs  -  Identify cognitive and physical deficits and behaviors that affect risk of falls    -  [place] fall precautions as indicated by assessment   - Educate patient/family on patient safety including physical limitations  - Instruct patient to call for assistance with activity based on assessment  - Modify environment to reduce risk of injury  - Consider OT/PT consult to assist with strengthening/mobility   Outcome: Not Progressing

## 2018-05-16 NOTE — PROGRESS NOTES
Progress Note - Critical Care   Christiana Navarro 56 y o  male MRN: [id number]  Unit/Bed#: MICU 06 Encounter: [id number]          ______________________________________________________________________  Assessment and Plan:   Patient Active Problem List   Diagnosis    Colitis    Spinal stenosis of cervical region    Hypertension    Pericarditis    Coronary artery disease involving native coronary artery of native heart    Leukocytosis    Asthma    Atherosclerosis of coronary artery    [facility]ation [facility] [address]    Dyslipidemia    Shock ([facility] [address] [facility]    Lactic acidosis    High anion gap metabolic acidosis    Acute kidney failure (HCC)    Transaminitis    Elevated troponin    Hypokalemia    Hyponatremia    Alcohol abuse    Tobacco abuse         Neuro:   - Acute encephalopathy secondary to alcohol withdrawal: Continue Serax 30 mg with CIWA protocol, currently CIWA score 19 with total 20 mg Valium and 7 mg Ativan required overnight  Will continue to monitor mental status closely  CV:   - ACS rule out: Likely type 2 NSTEMI, troponin continue to trend up, most recently 1 16->1 24 this AM  EKG on admission showed sinus rhythm with 1st degree AV block and rightward axis, NV interval increased with lengthened QT  Will repeat EKG this AM and continue to trend troponins  - Tachycardia: Likely secondary to acute alcohol withdrawal     Pulm:   - COPD: Continue Solumedrol 40 mg and Atrovent and Xopenex with respiratory protocol  Continue respiratory protocol with pulmonary hygiene  GI:   - History of Crohn's with likely acute flare: Continue serial abdominal exams, monitor distension for ascities, LFTS improved from yesterday  Home mesalamine held  Follow-up C diff  :   - Hernandez in place for close Is and Os, output over past 24 hours has been 45 cc/hr  - DEBBY: Cr improved to 1 8 from 3 33 yesterday, continue IVF hydration      F/E/N:   - Continue IVF hydration Isolyte 125 mL/hr   - Mg 1 7, repleted    - Continue NPO for now     ID:   - Septic shock likely secondary to acute Crohn's flare, GI source: Continue vancomycin , flagyl and cefepime  Afebrile overnight, WBC increased to 13  Procalcitonin 0 26  Lactic acid down trending, most recently 2 8  Continue to trend until less than 2  Heme:   - Continue Heparin DVT prophylaxis    Endo:   - Blood sugar 198 this AM, likely secondary to steroids and acute infection, will continue to monitor     Msk/Skin:   - Frequent turning and repositioning    Disposition: ICU level care     Code Status: Level 1 - Full Code    ______________________________________________________________________    Chief Complaint: Grumbles, shakes head to indicate no pain     24 Hour Events: Per night team and nursing, patient agitated and appeared to be hallucinating overnight, tremors worsening, tongue fasciculations  Groans but when asked if in pain shakes head to indicate no  3L Isolytes administered overnight for tachycardia up to 150s  20 mg Valium total given, 7 mg Ativan for agitation and per CIWA protocol  Levophed was discontinued last night with stable blood pressures  ______________________________________________________________________    Physical Exam:       Physical Exam   Constitutional: He appears well-developed and well-nourished  He appears distressed  Appears mildly distressed and agitated, will reach for his face but will then stop and rest his hand down again  Appropriately arousable to voice and physical stimuli but drowsy  CIWA score 19   HENT:   Head: Normocephalic and atraumatic  Right Ear: External ear normal    Left Ear: External ear normal    Neck: Normal range of motion  Neck supple  Cardiovascular: Regular rhythm, normal heart sounds and intact distal pulses  No murmur heard  Tachycardic    Pulmonary/Chest: Effort normal  No respiratory distress  He has wheezes  He has no rales     Diffuse end-expiratory wheezing throughout, diminished breath sounds at bases   Abdominal: Bowel sounds are normal  He exhibits distension  There is no rebound and no guarding  Distended tense abdomen, no rebound or guarding, hypoactive bowel sounds  Musculoskeletal: He exhibits no edema  Neurological:   Oriented to person and place, not to time, date or year  Tremulous at rest, worsened with upper extremity extension  GCS 12   Skin: Skin is warm  He is not diaphoretic  Vitals reviewed  ______________________________________________________________________  Vitals:    05/15/18 2100 05/15/18 2200 05/15/18 2300 18 0000   BP: 111/57 100/72 119/65 118/75   BP Location:       Pulse: (!) 134 (!) 132 (!) 130 (!) 132   Resp: 20 (!) 31 22 22   Temp:       TempSrc:       SpO2: 93% 93% 94% 95%   Weight:       Height:                  Temperature:   Temp (24hrs), Av °F (36 7 °C), Min:97 8 °F (36 6 °C), Max:98 3 °F (36 8 °C)    Current Temperature: 98 °F (36 7 °C)  Weights:   IBW: 63 8 kg    Body mass index is 27 11 kg/m²    Weight (last 2 days)     Date/Time   Weight    05/15/18 1800  76 2 (167 99)    05/15/18 1239  65 8 (145)            Hemodynamic Monitoring:  N/A     Non-Invasive/Invasive Ventilation Settings:  Respiratory    Lab Data (Last 4 hours)    None         O2/Vent Data (Last 4 hours)    None              No results found for: PHART, XZE2USO, PO2ART, TSY9MYR, A2BSOCSM, BEART, SOURCE  SpO2: SpO2: 95 %, SpO2 Activity: SpO2 Activity: At Rest, SpO2 Device: O2 Device: Nasal cannula, Capnography:    Intake and Outputs:  I/O        07 - 05/15 0700 05/15 07 -  0700    I V  (mL/kg)  772 2 (10 1)    IV Piggyback  1350    Total Intake(mL/kg)  2122 2 (27 9)    Urine (mL/kg/hr)  1100    Stool  0    Total Output   1100    Net   +1022 2          Unmeasured Stool Occurrence  3 x        UOP: 45/hour   Nutrition:        Diet Orders            Start     Ordered    05/15/18 183  Diet NPO  Diet effective now     Question Answer Comment   Diet Type NPO    RD to adjust diet per protocol? Yes        05/15/18 1830          Labs:     Results from last 7 days  Lab Units 05/16/18  0522 05/15/18  1935 05/15/18  1313 05/15/18  1304   WBC Thousand/uL 13 97*  --   --  10 21*   HEMOGLOBIN g/dL 12 4  --   --  14 0   I STAT HEMOGLOBIN g/dl  --   --  12 6  --    HEMATOCRIT % 36 3*  --   --  39 5   PLATELETS Thousands/uL 78* 57*  --  61*   NEUTROS PCT %  --   --   --  83*   MONOS PCT %  --   --   --  9       Results from last 7 days  Lab Units 05/16/18  0522 05/15/18  1313 05/15/18  1304   SODIUM mmol/L 133*  --  131*   POTASSIUM mmol/L 4 4  --  2 8*   CHLORIDE mmol/L 97*  --  90*   CO2 mmol/L 25  --  25   BUN mg/dL 18  --  23   CREATININE mg/dL 1 80*  --  3 33*   CALCIUM mg/dL 6 3*  --  7 4*   TOTAL PROTEIN g/dL 6 4  --  5 9*   BILIRUBIN TOTAL mg/dL 0 64  --  0 33   ALK PHOS U/L 190*  --  198*   ALT U/L 71  --  76   AST U/L 166*  --  194*   GLUCOSE RANDOM mg/dL 198*  --  149*   GLUCOSE, ISTAT mg/dl  --  145*  --        Results from last 7 days  Lab Units 05/16/18  0522   MAGNESIUM mg/dL 1 7     Lab Results   Component Value Date    PHOS 3 5 05/16/2018        Results from last 7 days  Lab Units 05/15/18  1305   INR  1 05   PTT seconds 27       0  Lab Value Date/Time   TROPONINI 1 24 (H) 05/16/2018 0522   TROPONINI 1 16 (H) 05/16/2018 0209   TROPONINI 1 09 (H) 05/16/2018 0053   TROPONINI 0 85 (H) 05/15/2018 1935   TROPONINI 0 72 (H) 05/15/2018 1728   TROPONINI 0 47 (H) 05/15/2018 1304   TROPONINI 0 02 12/07/2016 1931   TROPONINI 0 04 (H) 12/07/2016 0907   TROPONINI <0 04 05/22/2015 0123       Results from last 7 days  Lab Units 05/16/18  0413 05/16/18  0209 05/16/18  0053   LACTIC ACID mmol/L 2 8* 3 8* 3 7*     ABG:No results found for: PHART, DDH3IVH, PO2ART, UAV1LPG, G2ECOAQI, BEART, SOURCE  Imaging:  I have personally reviewed pertinent reports  EKG: Repeat for this morning   Micro:  No results found for: Juan Carlos Nguyen, SPUTUMCULTUR  Allergies:    Allergies   Allergen Reactions    Other      Brown cloth band aids        Medications:   Scheduled Meds:  Current Facility-Administered Medications:  cefepime 1,000 mg Intravenous Q24H AMALIA Hale    dexmedetomidine 0 1-0 7 mcg/kg/hr Intravenous Titrated Dandy Candelario DO Last Rate: 0 2 mcg/kg/hr (05/16/18 0515)   heparin (porcine) 5,000 Units Subcutaneous [facility] AMALIA Clifton    ipratropium 0 5 mg Nebulization TID Yas Painter MD    levalbuterol 1 25 mg Nebulization TID Yas Painter MD    LORazepam 0 5 mg Intravenous Q2H PRN AMALIA Hale    Or        LORazepam 1 mg Intravenous Q2H PRN AMALIA Hale    Or        LORazepam 2 mg Intravenous Q2H PRN AMALIA Hale    methylPREDNISolone sodium succinate 40 mg Intravenous Q8H [facility] AMALIA Clifton    metroNIDAZOLE 500 mg Intravenous [facility] AMALIA Hale Last Rate: 500 mg (05/15/18 2015)   multi-electrolyte 125 mL/hr Intravenous Continuous AMALIA Hale Last Rate: 125 mL/hr (05/15/18 1845)   nicotine 14 mg Transdermal Daily AMALIA Hale    norepinephrine 1-30 mcg/min Intravenous Titrated Yas Painter MD Last Rate: Stopped (05/15/18 2130)   oxazepam 30 mg Oral Q6H [facility] Dandy Candelario DO      Continuous Infusions:  dexmedetomidine 0 1-0 7 mcg/kg/hr Last Rate: 0 2 mcg/kg/hr (05/16/18 0515)   multi-electrolyte 125 mL/hr Last Rate: 125 mL/hr (05/15/18 1845)   norepinephrine 1-30 mcg/min Last Rate: Stopped (05/15/18 2130)     PRN Meds:    LORazepam 0 5 mg Q2H PRN   Or     LORazepam 1 mg Q2H PRN   Or     LORazepam 2 mg Q2H PRN     VTE Pharmacologic Prophylaxis: Heparin  VTE Mechanical Prophylaxis: sequential compression device  Invasive lines and devices:   Invasive Devices     Peripheral Intravenous Line            Peripheral IV 05/15/18 Left Antecubital less than 1 day    Peripheral IV 05/15/18 Left Hand less than 1 day    Peripheral IV 05/15/18 Right Antecubital less than 1 day          Drain Urethral Catheter 16 Fr  less than 1 day                     Napoleon Ibrahim DO

## 2018-05-16 NOTE — CASE MANAGEMENT
Initial Clinical Review    Admission: Date/Time/Statement: 5/15/18 @ 1643     Orders Placed This Encounter   Procedures    Inpatient Admission     Standing Status:   Standing     Number of Occurrences:   1     Order Specific Question:   Admitting Physician     Answer:   Fatimah De     Order Specific Question:   Level of Care     Answer:   Critical Care [15]     Order Specific Question:   Estimated length of stay     Answer:   More than 2 Midnights     Order Specific Question:   Certification     Answer:   I certify that inpatient services are medically necessary for this patient for a duration of greater than two midnights  See H&P and MD Progress Notes for additional information about the patient's course of treatment  ED: Date/Time/Mode of Arrival:   ED Arrival Information     Expected Arrival Acuity Means of Arrival Escorted By Service Admission Type    - 5/15/2018 12:31 Emergent Ambulance [facility] Critical Care/ICU Emergency    Arrival Complaint    Generalized Weakness        Chief Complaint:   Chief Complaint   Patient presents with    Weakness - Generalized     Per EMS, pt hasn't moved from couch in the past couple of days  Pt reports hx of COPD, had difficulty breathing today  History of Illness: Zenon Owen is a 56 y o  male who presents on 05/15 from home with complaint of malaise and weakness as well as incontinence of stool/urine due to inability to get off his couch  He has a past medical history significant for COPD, hypertension, coronary artery disease status post PCI to RCA in 2012, history of a right hemicolectomy secondary to unknown reason, alcohol abuse, tobacco abuse and ulcerative colitis/Crohn's  Per the patient he has had several episodes of emesis daily for at least the past month    He denies any increasing diarrhea or nausea, reports shortness of breath is at baseline, denies any fevers, chest pain, any difficulties voiding and reports urinating 3 to 4 times a day, he does endorse decreased appetite eating approximately 2 meals a day, he reports smoking cigars for 3-4 hours daily, drinking at least 1 bottle of kenji a day  In regards to his alcohol abuse he states that he has not stopped drinking for any significant amount of time and is unclear regarding withdrawal symptoms or seizures  In the emergency room he was significantly short of breath and received an extended nebulizer treatment with good effect  He was profoundly hypotensive and was resuscitated with 3 L of crystalloid, and ultimately started on norepinephrine  He had an acute kidney injury, significant metabolic derangements, an elevated troponin, and a lactic acidosis  For this reason he is being referred to the critical care unit for admission      ED Vital Signs:   ED Triage Vitals   Temperature Pulse Respirations Blood Pressure SpO2   05/15/18 1240 05/15/18 1239 05/15/18 1239 05/15/18 1239 05/15/18 1239   98 3 °F (36 8 °C) (!) 107 22 (!) 62/36 97 %      Temp Source Heart Rate Source Patient Position - Orthostatic VS BP Location FiO2 (%)   05/15/18 1240 05/15/18 1239 05/15/18 1239 05/15/18 1239 --   Oral Monitor Sitting Left arm       Pain Score       05/15/18 1239       No Pain        Wt Readings from Last 1 Encounters:   05/16/18 76 5 kg (168 lb 10 4 oz)     Vital Signs (abnormal):   05/16/18 0900  --   128  22  100/67  77  91 %  Nasal cannula  Sitting   05/16/18 0800  98 1 °F (36 7 °C)   136   32  106/80  89   86 %  Nasal cannula  Sitting   05/16/18 0720  --   150   39  117/87  100  98 %  Nasal cannula  --   05/16/18 0517  --   156   28  140/93  106  90 %  --  --   05/16/18 0500  --   150   30  143/96  112  90 %  --  --   05/16/18 0300  --   148   30  125/67  --  93 %  --  --   05/16/18 0125  --   144   26  153/88  111  94 %  --  --   05/16/18 0025  --   132  --  107/67  --  --  --  --   05/16/18 0000  --   132  22  118/75  91  95 %  --  --   05/15/18 2300  --   130  22  119/65  73  94 % --  --   05/15/18 2200  --   132   31  100/72  83  93 %  Nasal cannula  --   05/15/18 2100  --   134  20  111/57  70  93 %  --  --   05/15/18 2000  98 °F (36 7 °C)   130  20  113/61  76  95 %  --  --   05/15/18 1900  --   124   24  105/65  77  90 %  --  --   05/15/18 1800  97 8 °F (36 6 °C)   120  15  101/59  72  90 %  --  --   05/15/18 1730  --   109  18  99/56  --  92 %  Nasal cannula  Lying   05/15/18 1717  --   116  18  102/55  --  90 %  Nasal cannula  Lying   05/15/18 1706  --   116  20  98/59  --  92 %  Nasal cannula  Lying   05/15/18 1645  --   118  20  99/58  --  92 %  Nasal cannula  Lying   05/15/18 1615  --   112   24  104/59  --  92 %  Nasal cannula  Lying   05/15/18 1600  --   108   24  104/56  --  93 %  Nasal cannula  Lying   05/15/18 1552  --   112  22  102/63  --  94 %  Nasal cannula  Lying   05/15/18 1545  --   110  22  90/52  --  94 %  Nasal cannula  Lying   05/15/18 1510  --   117  22   80/51  --   82 %  Nasal cannula  Lying   05/15/18 1415  --   114  20   83/53  --  94 %  --  Lying   O2 Device: Heart Neb at 05/15/18 1415   05/15/18 1400  --  104  16   70/44  --  94 %  --  Lying   O2 Device: Heart Neb at 05/15/18 1400   05/15/18 1330  --  102  18   65/43  --  91 %  --  Lying   O2 Device: Heart Neb at 05/15/18 1330   05/15/18 1321  --  --  --  --  --   88 %  --  --   05/15/18 1300  --  78  20   69/37  --  91 %  --  Lying   05/15/18 1240  98 3 °F (36 8 °C)  --  --  --  --  --  Non-rebreather mask  --   05/15/18 1239  --   107  22   62/36  --  97 %  None (Room air)  Sitting     Abnormal Labs:   Ref Range & Units 05/15/18 1550   pH, Ravindra 7 300 - 7 400 7 260     pCO2, Ravindra 42 0 - 50 0 mm Hg 55 9     pO2, Ravindra 35 0 - 45 0 mm Hg 56 3        Ref Range & Units 05/16/18 0835   pH, Arterial 7 350 - 7 450 7 173     pCO2, Arterial 36 0 - 44 0 mm Hg 68 7     pO2, Arterial 75 0 - 129 0 mm Hg 82 2    HCO3, Arterial 22 0 - 28 0 mmol/L 24 7    Base Excess, Arterial mmol/L -4 9    O2 Content, Arterial 16 0 - 23 0 mL/dL 16 3       Ref Range & Units 05/16/18 0522   Sodium 136 - 145 mmol/L 133     Chloride 100 - 108 mmol/L 97     Creatinine 0 60 - 1 30 mg/dL 1 80     Glucose 65 - 140 mg/dL 198     Calcium 8 3 - 10 1 mg/dL 6 3     AST 5 - 45 U/L 166     Alkaline Phosphatase 46 - 116 U/L 190     Albumin 3 5 - 5 0 g/dL 2 8       Trop 0 72, 0 85, 1 09, 1 16,1 24  Lactic acid 5 8, 5 0, 4 8, 5 1, 3 7, 3 8, 2 8  Serum osmo 350  Procalcitonin 0 26  Stool for C diff pending   Blood cultures pending     Ref Range & Units 05/16/18 0522   WBC 4 31 - 10 16 Thousand/uL 13 97     RBC 3 88 - 5 62 Million/uL 3 76     Hematocrit 36 5 - 49 3 % 36 3     RDW 11 6 - 15 1 % 15 8     Platelets 438 - 271 Thousands/uL 78        Ref Range & Units 05/15/18 1635   Color, UA  Brown    Clarity, UA  Clear    pH, UA 4 5 - 8 0 5 5    Leukocytes, UA Negative Negative    Nitrite, UA Negative Negative    Protein, UA Negative mg/dl 100 (2+)     Glucose, UA Negative mg/dl Negative    Ketones, UA Negative mg/dl Negative    Urobilinogen, UA 0 2, 1 0 E U /dl E U /dl 0 2    Bilirubin, UA Negative Interference- unable to analyze     Comments: The dipstick result may be falsely positive do to interfering substances  We recommend reliance upon serum bilirubin, liver & kidney function tests to guide patient care if clinically indicated  Blood, UA Negative Negative    Specific Gravity, UA 1 003 - 1 030 1 025      Diagnostic Test Results:     5/15 CT chest, abd, pelvis - 1  Although exam is limited without intravenous contrast, there appears to be wall thickening centered around the sutures in the hepatic flexure of the colon, with associated inflammatory change and infiltration of the adjacent fat  There may be some involvement of the adjacent descending duodenum  Findings may be related to the history of Crohn's disease/ulcerative colitis  Occult tumor not excluded at this time  Follow-up to resolution recommended     2   Subtle increased ground glass and nodular densities right lung apex may be inflammatory/infectious  CT follow-up in 3-6 months may be considered to ensure resolution  Additional tiny nodular densities bilaterally appear similar  3   Nonobstructing renal calculi      5/15 EKG - Sinus tachycardia  Rightward axis  Cannot rule out Anterior infarct , age undetermined  Abnormal ECG  When compared with ECG of 06-FEB-2018 08:09,  Minimal criteria for Anterior infarct are now Present    5/15 EKG - Sinus rhythm with 1st degree A-V block  Rightward axis  Anterior infarct (cited on or before 15-MAY-2018)  Abnormal ECG  When compared with ECG of 15-MAY-2018 12:40, (unconfirmed)  CO interval has increased  QT has lengthened  __________________  5/16 EKG - Supraventricular tachycardia  Right atrial enlargement  Right superior axis deviation  Septal infarct (cited on or before 15-MAY-2018)  Abnormal ECG     ED Treatment:   Medication Administration from 05/15/2018 1231 to 05/15/2018 1830    Date/Time Order Dose Route Action   05/15/2018 1321 albuterol inhalation solution 10 mg 10 mg Nebulization Given   05/15/2018 1321 ipratropium (ATROVENT) 0 02 % inhalation solution 1 mg 1 mg Nebulization Given   05/15/2018 1321 sodium chloride 0 9 % inhalation solution 3 mL 3 mL Nebulization Given   05/15/2018 1316 methylPREDNISolone sodium succinate (Solu-MEDROL) injection 125 mg 125 mg Intravenous Given   05/15/2018 1348 sodium chloride 0 9 % bolus 1,000 mL 1,000 mL Intravenous New Bag   05/15/2018 1341 potassium chloride 20 mEq IVPB (premix) 20 mEq Intravenous New Bag   05/15/2018 1338 potassium chloride (K-DUR,KLOR-CON) CR tablet 40 mEq 40 mEq Oral Given   05/15/2018 1724 norepinephrine (LEVOPHED) 4 mg (STANDARD CONCENTRATION) IV in sodium chloride 0 9% 250 mL 5 mcg/min Intravenous Restarted   05/15/2018 1520 norepinephrine (LEVOPHED) 4 mg (STANDARD CONCENTRATION) IV in sodium chloride 0 9% 250 mL 5 mcg/min Intravenous New Bag   05/15/2018 1602 vancomycin (VANCOCIN) IVPB (premix) 1,000 mg 1,000 mg Intravenous New Bag   05/15/2018 1433 cefepime (MAXIPIME) 2 g/50 mL dextrose IVPB 2,000 mg Intravenous New Bag   05/15/2018 1743 levalbuterol (XOPENEX) inhalation solution 1 25 mg 1 25 mg Nebulization Given   05/15/2018 1743 ipratropium (ATROVENT) 0 02 % inhalation solution 0 5 mg 0 5 mg Nebulization Given        Past Medical/Surgical History: Active Ambulatory Problems     Diagnosis Date Noted    Colitis 10/22/2016    Spinal stenosis of cervical region 12/07/2016    Hypertension 12/07/2016    Pericarditis 12/07/2016    Coronary artery disease involving native coronary artery of native heart 12/07/2016    Leukocytosis 12/07/2016    Asthma 10/19/2013    Atherosclerosis of coronary artery 06/06/2016    COPD with exacerbation ([facility] ) 01/06/2017    Dyslipidemia 10/19/2013     Resolved Ambulatory Problems     Diagnosis Date Noted    No Resolved Ambulatory Problems     Past Medical History:   Diagnosis Date    Asthma     Cardiac disease     Colitis     Colon polyps     COPD (chronic obstructive pulmonary disease) ([facility] )     Coronary artery disease     Diverticulitis     Hyperlipidemia     Hypertension     Myocardial infarction ([facility] )     [facility] )      Admitting Diagnosis: Colitis [K52 9]  Generalized weakness [R53 1]    Age/Sex: 56 y o  male    Assessment/Plan:   1  Shock, presumed sepsis  2  Lactic acidosis  3  High anion gap metabolic acidosis  4  Acute kidney injury  5  Transaminitis  6  Elevated troponin  7  COPD with exacerbation  8  Hypokalemia  9  Hyponatremia  10  Alcohol abuse with potential for withdrawal  11  Tobacco abuse  12  Coronary artery disease  13   Crohns/ulcerative colitis     Plan:                Neuro: Frequent neurologic monitoring, institute [facility]                 CV: Close hemodynamic monitoring, trend troponins, would favor repeating an ECHO given history of CAD                 Pulm: Encourage good pulmonary hygiene, smoking cessation, provide nicotine patch, start steroids and nebulizers                 GI: Serial abdominal exams, trend LFTs, send c diff, hold mesalamine                  : Close intake and output, daily weights, trend serum creatinine                 F/E/N: Continue fluid resuscitation, replete electrolytes aggressively, NPO for now                 ID: Continue vancomycin and cefepime, add Flagyl, follow culture results/temperature/white count                 Heme: Begin heparin for DVT prophylaxis                 Endo: Follow blood sugars as needed                 Msk/Skin: Frequent turning and repositioning, out of bed as tolerated                 Disposition: ICU level care     VTE Pharmacologic Prophylaxis: Heparin  VTE Mechanical Prophylaxis: sequential compression device     Invasive lines and devices: Invasive Devices            Peripheral Intravenous Line                     Peripheral IV 05/15/18 Left Antecubital less than 1 day      Peripheral IV 05/15/18 Left Hand less than 1 day      Peripheral IV 05/15/18 Right Antecubital less than 1 day                 Code Status: Prior  Given critical illness, patient length of stay will require greater than two midnights      Admission Orders:  Scheduled Meds:   Current Facility-Administered Medications:  cefepime 1,000 mg Intravenous Q24H AMALIA Garcia    dexmedetomidine 0 1-0 7 mcg/kg/hr Intravenous Titrated Dandy Candelario DO Last Rate: 0 2 mcg/kg/hr (05/16/18 0515)   heparin (porcine) 5,000 Units Subcutaneous [facility] AMALIA Clifton    ipratropium 0 5 mg Nebulization TID Neel Gordon MD    levalbuterol 1 25 mg Nebulization TID Neel Gordon MD    LORazepam 0 5 mg Intravenous Q2H PRN AMALIA Garcia    Or        LORazepam 1 mg Intravenous Q2H PRN AMALIA Garcia    Or        LORazepam 2 mg Intravenous Q2H PRN AMALIA Garcia    methylPREDNISolone sodium succinate 40 mg Intravenous [facility] AMALIA Clifton    metroNIDAZOLE 500 mg Intravenous [facility] [place], CRNP Last Rate: Stopped (05/16/18 8012)   multi-electrolyte 125 mL/hr Intravenous Continuous [place], CRNP Last Rate: 125 mL/hr (05/15/18 1845)   nicotine 14 mg Transdermal Daily [place], CRNP    norepinephrine 1-30 mcg/min Intravenous Titrated Joseph Burris MD Last Rate: Stopped (05/15/18 2130)   oxazepam 30 mg Oral Q6H [address] Dandy Candelario DO      Continuous Infusions:   dexmedetomidine 0 1-0 7 mcg/kg/hr Last Rate: 0 2 mcg/kg/hr (05/16/18 0515)   multi-electrolyte 125 mL/hr Last Rate: 125 mL/hr (05/15/18 1845)   norepinephrine 1-30 mcg/min Last Rate: Stopped (05/15/18 2130)     PRN Meds: LORazepam **OR** LORazepam **OR** LORazepam x4 kast 24 hrs, now d/c     MICU  SCDs  Diet NPO   Respiratory protocol   POC glucose q 6 hr   Neuro checks q 4 hr   Arterial line   Daily wt  Up w/ assist     5/16 Critical Care Progress Note  Neuro:   - Acute encephalopathy secondary to alcohol withdrawal: Continue Serax 30 mg with CIWA protocol, currently CIWA score 19 with total 20 mg Valium and 7 mg Ativan required overnight  Will continue to monitor mental status closely        CV:   - ACS rule out: Likely type 2 NSTEMI, troponin continue to trend up, most recently 1 16->1 24 this AM  EKG on admission showed sinus rhythm with 1st degree AV block and rightward axis, HI interval increased with lengthened QT  Will repeat EKG this AM and continue to trend troponins  - Tachycardia: Likely secondary to acute alcohol withdrawal      Pulm:   - COPD: Continue Solumedrol 40 mg and Atrovent and Xopenex with respiratory protocol  Continue respiratory protocol with pulmonary hygiene       GI:   - History of Crohn's with likely acute flare: Continue serial abdominal exams, monitor distension for ascities, LFTS improved from yesterday  Home mesalamine held   Follow-up C diff        :   - Hernandez in place for close Is and Os, output over past 24 hours has been 45 cc/hr  - DEBBY: Cr improved to 1 8 from 3 33 yesterday, continue IVF hydration       F/E/N:   - Continue IVF hydration Isolyte 125 mL/hr   - Mg 1 7, repleted  - Continue NPO for now      ID:   - Septic shock likely secondary to acute Crohn's flare, GI source: Continue vancomycin , flagyl and cefepime  Afebrile overnight, WBC increased to 13  Procalcitonin 0 26  Lactic acid down trending, most recently 2 8  Continue to trend until less than 2         Heme:   - Continue Heparin DVT prophylaxis     Endo:   - Blood sugar 198 this AM, likely secondary to steroids and acute infection, will continue to monitor                Msk/Skin:   - Frequent turning and repositioning     Disposition: ICU level care      Code Status: Level 1 - Full Code    Chief Complaint: Grumbles, shakes head to indicate no pain      24 Hour Events: Per night team and nursing, patient agitated and appeared to be hallucinating overnight, tremors worsening, tongue fasciculations  Groans but when asked if in pain shakes head to indicate no  3L Isolytes administered overnight for tachycardia up to 150s  20 mg Valium total given, 7 mg Ativan for agitation and per CIWA protocol  Levophed was discontinued last night with stable blood pressures  Thank you,  [facility] in the [facility] [address] for 2017  Network Utilization Review Department  Phone: [phone]; Fax [phone]  ATTENTION: The Network Utilization Review Department is now centralized for our 7 Facilities  Make a note that we have a new phone and fax numbers for our Department  Please call with any questions or concerns to [phone] and carefully follow the prompts so that you are directed to the right person  All voicemails are confidential  Fax any determinations, approvals, denials, and requests for initial or continue stay review clinical to [phone]   Due to HIGH CALL volume, it would be easier if you could please send faxed requests to expedite your requests and in part, help us provide discharge notifications faster

## 2018-05-17 ENCOUNTER — APPOINTMENT (INPATIENT)
Dept: RADIOLOGY | Facility: HOSPITAL | Age: 58
DRG: 710 | End: 2018-05-17
Payer: COMMERCIAL

## 2018-05-17 PROBLEM — F10.239 ALCOHOL DEPENDENCE WITH WITHDRAWAL (HCC): Status: ACTIVE | Noted: 2018-05-17

## 2018-05-17 LAB
AMMONIA PLAS-SCNC: 48 UMOL/L (ref 11–35)
ANION GAP SERPL CALCULATED.3IONS-SCNC: 8 MMOL/L (ref 4–13)
ANISOCYTOSIS BLD QL SMEAR: PRESENT
ATRIAL RATE: 83 BPM
ATRIAL RATE: 83 BPM
BASOPHILS # BLD MANUAL: 0 THOUSAND/UL (ref 0–0.1)
BASOPHILS NFR MAR MANUAL: 0 % (ref 0–1)
BLASTS NFR BLD MANUAL: 0 %
BUN SERPL-MCNC: 20 MG/DL (ref 5–25)
C DIFF TOX GENS STL QL NAA+PROBE: NORMAL
CALCIUM SERPL-MCNC: 6.5 MG/DL (ref 8.3–10.1)
CHLORIDE SERPL-SCNC: 100 MMOL/L (ref 100–108)
CO2 SERPL-SCNC: 28 MMOL/L (ref 21–32)
CREAT SERPL-MCNC: 1.11 MG/DL (ref 0.6–1.3)
EOSINOPHIL # BLD MANUAL: 0 THOUSAND/UL (ref 0–0.4)
EOSINOPHIL NFR BLD MANUAL: 0 % (ref 0–6)
ERYTHROCYTE [DISTWIDTH] IN BLOOD BY AUTOMATED COUNT: 15.9 % (ref 11.6–15.1)
GFR SERPL CREATININE-BSD FRML MDRD: 74 ML/MIN/1.73SQ M
GLUCOSE SERPL-MCNC: 145 MG/DL (ref 65–140)
GLUCOSE SERPL-MCNC: 172 MG/DL (ref 65–140)
GLUCOSE SERPL-MCNC: 191 MG/DL (ref 65–140)
GLUCOSE SERPL-MCNC: 215 MG/DL (ref 65–140)
GLUCOSE SERPL-MCNC: 255 MG/DL (ref 65–140)
GLUCOSE SERPL-MCNC: 279 MG/DL (ref 65–140)
HCT VFR BLD AUTO: 33.4 % (ref 36.5–49.3)
HGB BLD-MCNC: 11.3 G/DL (ref 12–17)
LYMPHOCYTES # BLD AUTO: 0.08 THOUSAND/UL (ref 0.6–4.47)
LYMPHOCYTES # BLD AUTO: 1 % (ref 14–44)
MAGNESIUM SERPL-MCNC: 3 MG/DL (ref 1.6–2.6)
MCH RBC QN AUTO: 32.9 PG (ref 26.8–34.3)
MCHC RBC AUTO-ENTMCNC: 33.8 G/DL (ref 31.4–37.4)
MCV RBC AUTO: 97 FL (ref 82–98)
METAMYELOCYTES NFR BLD MANUAL: 0 % (ref 0–1)
MONOCYTES # BLD AUTO: 0.08 THOUSAND/UL (ref 0–1.22)
MONOCYTES NFR BLD: 1 % (ref 4–12)
MYELOCYTES NFR BLD MANUAL: 0 % (ref 0–1)
NEUTROPHILS # BLD MANUAL: 7.72 THOUSAND/UL (ref 1.85–7.62)
NEUTS BAND NFR BLD MANUAL: 0 % (ref 0–8)
NEUTS SEG NFR BLD AUTO: 98 % (ref 43–75)
NRBC BLD AUTO-RTO: 0 /100 WBCS
P AXIS: 66 DEGREES
PHOSPHATE SERPL-MCNC: 1.4 MG/DL (ref 2.7–4.5)
PLASMA CELLS NFR BLD: 0 % (ref 0–0)
PLATELET # BLD AUTO: 64 THOUSANDS/UL (ref 149–390)
PLATELET BLD QL SMEAR: ABNORMAL
PMV BLD AUTO: 11.9 FL (ref 8.9–12.7)
POTASSIUM SERPL-SCNC: 4.2 MMOL/L (ref 3.5–5.3)
PR INTERVAL: 142 MS
PROCALCITONIN SERPL-MCNC: 0.13 NG/ML
PROMYELOCYTES NFR BLD MANUAL: 0 % (ref 0–0)
QRS AXIS: 113 DEGREES
QRS AXIS: 98 DEGREES
QRSD INTERVAL: 92 MS
QRSD INTERVAL: 96 MS
QT INTERVAL: 575 MS
QT INTERVAL: 579 MS
QTC INTERVAL: 676 MS
QTC INTERVAL: 681 MS
RBC # BLD AUTO: 3.43 MILLION/UL (ref 3.88–5.62)
RBC MORPH BLD: PRESENT
SODIUM SERPL-SCNC: 136 MMOL/L (ref 136–145)
T WAVE AXIS: 114 DEGREES
T WAVE AXIS: 167 DEGREES
UNIDENT CELLS # BLD: 0 % (ref 0–0)
VARIANT LYMPHS # BLD AUTO: 0 %
VENTRICULAR RATE: 83 BPM
VENTRICULAR RATE: 83 BPM
WBC # BLD AUTO: 7.88 THOUSAND/UL (ref 4.31–10.16)

## 2018-05-17 PROCEDURE — 85027 COMPLETE CBC AUTOMATED: CPT | Performed by: FAMILY MEDICINE

## 2018-05-17 PROCEDURE — 93010 ELECTROCARDIOGRAM REPORT: CPT | Performed by: INTERNAL MEDICINE

## 2018-05-17 PROCEDURE — 82140 ASSAY OF AMMONIA: CPT | Performed by: FAMILY MEDICINE

## 2018-05-17 PROCEDURE — 93005 ELECTROCARDIOGRAM TRACING: CPT

## 2018-05-17 PROCEDURE — 84145 PROCALCITONIN (PCT): CPT | Performed by: FAMILY MEDICINE

## 2018-05-17 PROCEDURE — 94640 AIRWAY INHALATION TREATMENT: CPT

## 2018-05-17 PROCEDURE — 94644 CONT INHLJ TX 1ST HOUR: CPT

## 2018-05-17 PROCEDURE — 92610 EVALUATE SWALLOWING FUNCTION: CPT

## 2018-05-17 PROCEDURE — 80048 BASIC METABOLIC PNL TOTAL CA: CPT | Performed by: FAMILY MEDICINE

## 2018-05-17 PROCEDURE — 84100 ASSAY OF PHOSPHORUS: CPT | Performed by: FAMILY MEDICINE

## 2018-05-17 PROCEDURE — 85007 BL SMEAR W/DIFF WBC COUNT: CPT | Performed by: FAMILY MEDICINE

## 2018-05-17 PROCEDURE — 99233 SBSQ HOSP IP/OBS HIGH 50: CPT | Performed by: INTERNAL MEDICINE

## 2018-05-17 PROCEDURE — 71045 X-RAY EXAM CHEST 1 VIEW: CPT

## 2018-05-17 PROCEDURE — 94660 CPAP INITIATION&MGMT: CPT

## 2018-05-17 PROCEDURE — 94760 N-INVAS EAR/PLS OXIMETRY 1: CPT

## 2018-05-17 PROCEDURE — 82948 REAGENT STRIP/BLOOD GLUCOSE: CPT

## 2018-05-17 PROCEDURE — 83735 ASSAY OF MAGNESIUM: CPT | Performed by: FAMILY MEDICINE

## 2018-05-17 RX ORDER — DIAZEPAM 5 MG/ML
10 INJECTION, SOLUTION INTRAMUSCULAR; INTRAVENOUS ONCE
Status: COMPLETED | OUTPATIENT
Start: 2018-05-17 | End: 2018-05-17

## 2018-05-17 RX ORDER — QUETIAPINE FUMARATE 25 MG/1
50 TABLET, FILM COATED ORAL
Status: DISCONTINUED | OUTPATIENT
Start: 2018-05-17 | End: 2018-05-17

## 2018-05-17 RX ORDER — HALOPERIDOL 5 MG/ML
5 INJECTION INTRAMUSCULAR EVERY 6 HOURS PRN
Status: DISCONTINUED | OUTPATIENT
Start: 2018-05-17 | End: 2018-05-17

## 2018-05-17 RX ORDER — LEVALBUTEROL 1.25 MG/.5ML
1.25 SOLUTION, CONCENTRATE RESPIRATORY (INHALATION) EVERY 6 HOURS
Status: DISCONTINUED | OUTPATIENT
Start: 2018-05-17 | End: 2018-05-22

## 2018-05-17 RX ORDER — DIAZEPAM 5 MG/ML
INJECTION, SOLUTION INTRAMUSCULAR; INTRAVENOUS
Status: COMPLETED
Start: 2018-05-17 | End: 2018-05-17

## 2018-05-17 RX ORDER — LEVALBUTEROL 1.25 MG/.5ML
SOLUTION, CONCENTRATE RESPIRATORY (INHALATION)
Status: COMPLETED
Start: 2018-05-17 | End: 2018-05-17

## 2018-05-17 RX ORDER — BUDESONIDE 0.5 MG/2ML
0.5 INHALANT ORAL
Status: DISCONTINUED | OUTPATIENT
Start: 2018-05-17 | End: 2018-05-24

## 2018-05-17 RX ORDER — LACTULOSE 20 G/30ML
20 SOLUTION ORAL DAILY
Status: DISCONTINUED | OUTPATIENT
Start: 2018-05-17 | End: 2018-05-21

## 2018-05-17 RX ORDER — SODIUM CHLORIDE, SODIUM GLUCONATE, SODIUM ACETATE, POTASSIUM CHLORIDE, MAGNESIUM CHLORIDE, SODIUM PHOSPHATE, DIBASIC, AND POTASSIUM PHOSPHATE .53; .5; .37; .037; .03; .012; .00082 G/100ML; G/100ML; G/100ML; G/100ML; G/100ML; G/100ML; G/100ML
125 INJECTION, SOLUTION INTRAVENOUS CONTINUOUS
Status: DISCONTINUED | OUTPATIENT
Start: 2018-05-17 | End: 2018-05-18

## 2018-05-17 RX ORDER — SODIUM CHLORIDE FOR INHALATION 0.9 %
8 VIAL, NEBULIZER (ML) INHALATION ONCE
Status: COMPLETED | OUTPATIENT
Start: 2018-05-17 | End: 2018-05-17

## 2018-05-17 RX ADMIN — DIAZEPAM 10 MG: 5 INJECTION, SOLUTION INTRAMUSCULAR; INTRAVENOUS at 05:02

## 2018-05-17 RX ADMIN — NICOTINE 14 MG: 14 PATCH, EXTENDED RELEASE TRANSDERMAL at 09:24

## 2018-05-17 RX ADMIN — HEPARIN SODIUM 5000 UNITS: 5000 INJECTION, SOLUTION INTRAVENOUS; SUBCUTANEOUS at 05:18

## 2018-05-17 RX ADMIN — METHYLPREDNISOLONE SODIUM SUCCINATE 40 MG: 40 INJECTION, POWDER, FOR SOLUTION INTRAMUSCULAR; INTRAVENOUS at 22:07

## 2018-05-17 RX ADMIN — DIAZEPAM 10 MG: 5 INJECTION, SOLUTION INTRAMUSCULAR; INTRAVENOUS at 05:54

## 2018-05-17 RX ADMIN — INSULIN LISPRO 2 UNITS: 100 INJECTION, SOLUTION INTRAVENOUS; SUBCUTANEOUS at 00:59

## 2018-05-17 RX ADMIN — LEVALBUTEROL HYDROCHLORIDE 1.25 MG: 1.25 SOLUTION, CONCENTRATE RESPIRATORY (INHALATION) at 08:32

## 2018-05-17 RX ADMIN — LORAZEPAM 2 MG: 2 INJECTION INTRAMUSCULAR; INTRAVENOUS at 04:01

## 2018-05-17 RX ADMIN — METHYLPREDNISOLONE SODIUM SUCCINATE 40 MG: 40 INJECTION, POWDER, FOR SOLUTION INTRAMUSCULAR; INTRAVENOUS at 14:52

## 2018-05-17 RX ADMIN — Medication 0.7 MCG/KG/HR: at 17:39

## 2018-05-17 RX ADMIN — OXAZEPAM 15 MG: 15 CAPSULE, GELATIN COATED ORAL at 05:18

## 2018-05-17 RX ADMIN — HEPARIN SODIUM 5000 UNITS: 5000 INJECTION, SOLUTION INTRAVENOUS; SUBCUTANEOUS at 14:52

## 2018-05-17 RX ADMIN — SODIUM CHLORIDE, SODIUM GLUCONATE, SODIUM ACETATE, POTASSIUM CHLORIDE, MAGNESIUM CHLORIDE, SODIUM PHOSPHATE, DIBASIC, AND POTASSIUM PHOSPHATE 125 ML/HR: .53; .5; .37; .037; .03; .012; .00082 INJECTION, SOLUTION INTRAVENOUS at 17:29

## 2018-05-17 RX ADMIN — LEVALBUTEROL HYDROCHLORIDE 1.25 MG: 1.25 SOLUTION, CONCENTRATE RESPIRATORY (INHALATION) at 19:15

## 2018-05-17 RX ADMIN — IPRATROPIUM BROMIDE 0.5 MG: 0.5 SOLUTION RESPIRATORY (INHALATION) at 13:11

## 2018-05-17 RX ADMIN — ISODIUM CHLORIDE 9 ML: 0.03 SOLUTION RESPIRATORY (INHALATION) at 16:45

## 2018-05-17 RX ADMIN — SODIUM CHLORIDE, SODIUM GLUCONATE, SODIUM ACETATE, POTASSIUM CHLORIDE, MAGNESIUM CHLORIDE, SODIUM PHOSPHATE, DIBASIC, AND POTASSIUM PHOSPHATE 125 ML/HR: .53; .5; .37; .037; .03; .012; .00082 INJECTION, SOLUTION INTRAVENOUS at 09:30

## 2018-05-17 RX ADMIN — Medication 0.7 MCG/KG/HR: at 00:07

## 2018-05-17 RX ADMIN — IPRATROPIUM BROMIDE 0.5 MG: 0.5 SOLUTION RESPIRATORY (INHALATION) at 19:15

## 2018-05-17 RX ADMIN — INSULIN LISPRO 2 UNITS: 100 INJECTION, SOLUTION INTRAVENOUS; SUBCUTANEOUS at 12:43

## 2018-05-17 RX ADMIN — LEVALBUTEROL HYDROCHLORIDE 1.25 MG: 1.25 SOLUTION, CONCENTRATE RESPIRATORY (INHALATION) at 13:11

## 2018-05-17 RX ADMIN — METRONIDAZOLE 500 MG: 500 INJECTION, SOLUTION INTRAVENOUS at 05:19

## 2018-05-17 RX ADMIN — OXAZEPAM 15 MG: 15 CAPSULE, GELATIN COATED ORAL at 00:59

## 2018-05-17 RX ADMIN — OXAZEPAM 15 MG: 15 CAPSULE, GELATIN COATED ORAL at 18:31

## 2018-05-17 RX ADMIN — OXAZEPAM 15 MG: 15 CAPSULE, GELATIN COATED ORAL at 12:47

## 2018-05-17 RX ADMIN — Medication 0.7 MCG/KG/HR: at 22:20

## 2018-05-17 RX ADMIN — LACTULOSE 20 G: 20 SOLUTION ORAL at 12:46

## 2018-05-17 RX ADMIN — METHYLPREDNISOLONE SODIUM SUCCINATE 40 MG: 40 INJECTION, POWDER, FOR SOLUTION INTRAMUSCULAR; INTRAVENOUS at 05:18

## 2018-05-17 RX ADMIN — IPRATROPIUM BROMIDE 0.5 MG: 0.5 SOLUTION RESPIRATORY (INHALATION) at 08:32

## 2018-05-17 RX ADMIN — INSULIN LISPRO 4 UNITS: 100 INJECTION, SOLUTION INTRAVENOUS; SUBCUTANEOUS at 18:28

## 2018-05-17 RX ADMIN — ALBUTEROL SULFATE 10 MG: 2.5 SOLUTION RESPIRATORY (INHALATION) at 16:35

## 2018-05-17 RX ADMIN — DEXMEDETOMIDINE HYDROCHLORIDE 1.2 MCG/KG/HR: 100 INJECTION, SOLUTION INTRAVENOUS at 09:58

## 2018-05-17 RX ADMIN — SODIUM CHLORIDE, SODIUM GLUCONATE, SODIUM ACETATE, POTASSIUM CHLORIDE, MAGNESIUM CHLORIDE, SODIUM PHOSPHATE, DIBASIC, AND POTASSIUM PHOSPHATE 125 ML/HR: .53; .5; .37; .037; .03; .012; .00082 INJECTION, SOLUTION INTRAVENOUS at 10:50

## 2018-05-17 RX ADMIN — SODIUM PHOSPHATE, MONOBASIC, MONOHYDRATE 30 MMOL: 276; 142 INJECTION, SOLUTION INTRAVENOUS at 09:24

## 2018-05-17 RX ADMIN — HEPARIN SODIUM 5000 UNITS: 5000 INJECTION, SOLUTION INTRAVENOUS; SUBCUTANEOUS at 22:07

## 2018-05-17 RX ADMIN — Medication 0.7 MCG/KG/HR: at 10:50

## 2018-05-17 RX ADMIN — BUDESONIDE 0.5 MG: 0.5 INHALANT RESPIRATORY (INHALATION) at 19:15

## 2018-05-17 RX ADMIN — CEFEPIME HYDROCHLORIDE 1000 MG: 1 INJECTION, SOLUTION INTRAVENOUS at 01:12

## 2018-05-17 NOTE — PROGRESS NOTES
Progress Note - Critical Care   Sherrell Aguilar 56 y o  male MRN: [id number]  Unit/Bed#: MICU 06 Encounter: [id number]          ______________________________________________________________________  Assessment and Plan:   Patient Active Problem List   Diagnosis    Colitis    Spinal stenosis of cervical region    Hypertension    Pericarditis    Coronary artery disease involving native coronary artery of native heart    Leukocytosis    Asthma    Atherosclerosis of coronary artery    [facility] [address] [facility]    Dyslipidemia    Shock ([facility] [address] [facility]    Lactic acidosis    High anion gap metabolic acidosis    Acute kidney failure (HCC)    Transaminitis    Elevated troponin    Hypokalemia    Hyponatremia    Alcohol abuse    Tobacco abuse    Alcohol dependence with withdrawal (HCC)         Neuro:   - Acute encephalopathy secondary to alcohol withdrawal- Continue Serax 15 mg with CIWA protocol, 20 mg Valium and 2 mg Ativan required overnight  Precedex increased to 1 2 mcg/kg/hr  CV:   - Elevated troponin likely type 2 NSTEMI- Troponin trending down 1 24-1 15, will continue to trend and follow-up AM EKG  EKG yesterday morning showed SVT with right atrial enlargement, right superior axis deviation with septal infarct  Will continue to trend troponin and follow-up AM EKG  - Tachycardia- likely secondary to alcohol withdrawal     Pulm:   - Acute on chronic COPD- Respiratory status worsening requiring BiPAP overnight due to hypoxia and increased work of breathing  Continue Solumedrol, Xopenex, Atrovent  Will follow-up ABG  GI:   - History of Crohn's with likely acute flare- Abdominal exam unchanged, afebrile with no leukocytosis  C diff in process  :   - Hernandez in place for continued Is and Os, output 30-40 cc overnight   Will continue IVF and consider bolus if less than 0 5 cc/kg/hr    - DEBBY- Improved, Cr 1 11, continue IVF hydration     F/E/N:   - Continue IVF hydration at 125 mL/hr   - Phos 1 4 this AM, repleted   - Continue NPO for now, consider tube feeding if respiratory status and mental status does not improve today     ID:  - Septic shock likely secondary to acute Crohn's flare- Procalcitonin 0 13 this morning, blood cultures x2 no growth 24 hours  Will plan on discontinuing antibiotics today  Heme:   - Thrombocytopenia- Platelets 64 this morning, has been trend over past couple of days  Will continue to monitor    - Continue Heparin DVT prophylaxis    Endo:   - Elevated glucose- 2 units SSI required overnight, glucose 172 this AM  Continue SSI#3     Msk/Skin:   - Continue routine skin care with frequent repositioning     Disposition: ICU level care     Code Status: Level 1 - Full Code    ______________________________________________________________________    Chief Complaint: Agitated and angry     24 Hour Events: Patient very agitated overnight, angry and combative, mental status waxing and waning, oriented to person and intermittently place, not oriented to time  Precedex was increased to 1 2 and 20 mg Valium, 2 mg Ativan given per CIWA protocol and agitation  10 beat nonsustained Vtach overnight  Early last night O2 sat decreased to 80s, BiPAP was placed         ______________________________________________________________________    Physical Exam:       Physical Exam   Constitutional: He appears well-developed and well-nourished  No distress  HENT:   Head: Normocephalic and atraumatic  Neck: Normal range of motion  Neck supple  Cardiovascular: Normal rate, regular rhythm, normal heart sounds and intact distal pulses  No murmur heard  Pulmonary/Chest: Effort normal  No respiratory distress  Good air movement throughout, diminished at the bases with diffuse scattered end-expiratory wheezing   Abdominal: Soft  Bowel sounds are normal  He exhibits distension  There is no tenderness  There is no rebound and no guarding     Abdomen distended and tense, no rebound or guarding Musculoskeletal: He exhibits no edema  Neurological:   Somnolent, patient examined following 20 mg Valium  Earlier in morning patient visualized to be yelling, speaking in sentences, moving all extremities   Skin: Skin is warm  He is not diaphoretic  Vitals reviewed  ______________________________________________________________________  Vitals:    18 2300 18 2332 18 0000 18 0400   BP: 113/79  118/82    BP Location:   Right arm    Pulse: 76  76    Resp: 19  15    Temp:   98 5 °F (36 9 °C)    TempSrc:   Oral    SpO2: 98% 98% 98% 97%   Weight:       Height:                  Temperature:   Temp (24hrs), Av 2 °F (36 8 °C), Min:98 °F (36 7 °C), Max:98 5 °F (36 9 °C)    Current Temperature: 98 5 °F (36 9 °C)  Weights:   IBW: 63 8 kg    Body mass index is 27 22 kg/m²    Weight (last 2 days)     Date/Time   Weight    18 0600  76 5 (168 65)    05/15/18 1800  76 2 (167 99)    05/15/18 1239  65 8 (145)            Hemodynamic Monitoring:  N/A     Non-Invasive/Invasive Ventilation Settings:  Respiratory    Lab Data (Last 4 hours)    None         O2/Vent Data (Last 4 hours)       0400          Non-Invasive Ventilation Mode BiPAP                 Lab Results   Component Value Date    PHART 7 337 (L) 2018    HKP8NSA 48 4 (H) 2018    PO2ART 92 1 2018    YOX5YTM 25 4 2018    BEART -0 8 2018    SOURCE Radial, Right 2018     SpO2: SpO2: 96 %, SpO2 Activity: SpO2 Activity: At Rest, SpO2 Device: O2 Device: Other (comment) (bipap), Capnography:    Intake and Outputs:  I/O       05/15 0701 -  0700  07 -  0700    I V  (mL/kg) 3443 (45) 2492 7 (32 6)    IV Piggyback 1350 450    Total Intake(mL/kg) 4793 (62 7) 2942 7 (38 5)    Urine (mL/kg/hr) 1850 910 (0 5)    Stool 0     Total Output 1850 910    Net +2943 +2032 7          Unmeasured Stool Occurrence 6 x         UOP: 37/hour   Nutrition:        Diet Orders            Start     Ordered 05/15/18 1831  Diet NPO  Diet effective now     Question Answer Comment   Diet Type NPO    RD to adjust diet per protocol?  Yes        05/15/18 1830          Labs:     Results from last 7 days  Lab Units 05/17/18 0418 05/16/18  0522 05/15/18  1935 05/15/18  1313 05/15/18  1304   WBC Thousand/uL 7 88 13 97*  --   --  10 21*   HEMOGLOBIN g/dL 11 3* 12 4  --   --  14 0   I STAT HEMOGLOBIN g/dl  --   --   --  12 6  --    HEMATOCRIT % 33 4* 36 3*  --   --  39 5   PLATELETS Thousands/uL 64* 78* 57*  --  61*   NEUTROS PCT %  --   --   --   --  83*   MONOS PCT %  --   --   --   --  9   MONO PCT MAN %  --  0*  --   --   --        Results from last 7 days  Lab Units 05/17/18 0418 05/16/18  0522 05/15/18  1313 05/15/18  1304   SODIUM mmol/L 136 133*  --  131*   POTASSIUM mmol/L 4 2 4 4  --  2 8*   CHLORIDE mmol/L 100 97*  --  90*   CO2 mmol/L 28 25  --  25   BUN mg/dL 20 18  --  23   CREATININE mg/dL 1 11 1 80*  --  3 33*   CALCIUM mg/dL 6 5* 6 3*  --  7 4*   TOTAL PROTEIN g/dL  --  6 4  --  5 9*   BILIRUBIN TOTAL mg/dL  --  0 64  --  0 33   ALK PHOS U/L  --  190*  --  198*   ALT U/L  --  71  --  76   AST U/L  --  166*  --  194*   GLUCOSE RANDOM mg/dL 172* 198*  --  149*   GLUCOSE, ISTAT mg/dl  --   --  145*  --        Results from last 7 days  Lab Units 05/17/18 0418 05/16/18  0522   MAGNESIUM mg/dL 3 0* 1 7     Lab Results   Component Value Date    PHOS 1 4 (L) 05/17/2018    PHOS 3 5 05/16/2018        Results from last 7 days  Lab Units 05/15/18  1305   INR  1 05   PTT seconds 27       0  Lab Value Date/Time   TROPONINI 1 15 (H) 05/16/2018 1416   TROPONINI 1 24 (H) 05/16/2018 0522   TROPONINI 1 16 (H) 05/16/2018 0209   TROPONINI 1 09 (H) 05/16/2018 0053   TROPONINI 0 85 (H) 05/15/2018 1935   TROPONINI 0 72 (H) 05/15/2018 1728   TROPONINI 0 47 (H) 05/15/2018 1304   TROPONINI 0 02 12/07/2016 1931   TROPONINI 0 04 (H) 12/07/2016 0907   TROPONINI <0 04 05/22/2015 0123       Results from last 7 days  Lab Units 05/16/18  9431 05/16/18  0413 05/16/18  0209   LACTIC ACID mmol/L 1 6 2 8* 3 8*     ABG:  Lab Results   Component Value Date    PHART 7 337 (L) 05/16/2018    ZLT5JAF 48 4 (H) 05/16/2018    PO2ART 92 1 05/16/2018    BTE5GST 25 4 05/16/2018    BEART -0 8 05/16/2018    SOURCE Radial, Right 05/16/2018     Imaging: CXR- Small L effusion with basilar subsegmental atelectasis I have personally reviewed pertinent reports  Micro:  Lab Results   Component Value Date    BLOODCX No Growth at 24 hrs  05/15/2018    BLOODCX No Growth at 24 hrs  05/15/2018     Allergies:    Allergies   Allergen Reactions    Other      Brown cloth band aids        Medications:   Scheduled Meds:  Current Facility-Administered Medications:  cefepime 1,000 mg Intravenous Q12H Clinton Rubio PA-C Last Rate: 1,000 mg (05/17/18 0112)   dexmedetomidine 0 1-1 2 mcg/kg/hr Intravenous Titrated Cholo Jarrett MD Last Rate: 1 2 mcg/kg/hr (05/17/18 0518)   heparin (porcine) 5,000 Units Subcutaneous [facility] AMALIA Felming    insulin lispro 1-6 Units Subcutaneous Q6H [address] Sherra Homans, DO    ipratropium 0 5 mg Nebulization TID Tavon Merida MD    levalbuterol 1 25 mg Nebulization TID Tavon Merida MD    LORazepam 0 5 mg Intravenous Q2H PRN AMALIA Mcmahon    Or        LORazepam 1 mg Intravenous Q2H PRN AMALIA Mcmahon    Or        LORazepam 2 mg Intravenous Q2H PRN AMALIA Mcmahon    methylPREDNISolone sodium succinate 40 mg Intravenous [facility] AMALIA Clifton    metroNIDAZOLE 500 mg Intravenous [facility] [address] AMALIA Fleming Last Rate: 500 mg (05/17/18 0519)   multi-electrolyte 125 mL/hr Intravenous Continuous AMALIA Mcmahon Last Rate: 125 mL/hr (05/16/18 2134)   nicotine 14 mg Transdermal Daily AMALIA Mcmhaon    norepinephrine 1-30 mcg/min Intravenous Titrated Tavon Merida MD Last Rate: Stopped (05/15/18 2130)   oxazepam 15 mg Oral Q6H [address] Rik Bernheim, MD      Continuous Infusions:  dexmedetomidine 0  1-1 2 mcg/kg/hr Last Rate: 1 2 mcg/kg/hr (05/17/18 0518)   multi-electrolyte 125 mL/hr Last Rate: 125 mL/hr (05/16/18 2134)   norepinephrine 1-30 mcg/min Last Rate: Stopped (05/15/18 2130)     PRN Meds:    LORazepam 0 5 mg Q2H PRN   Or     LORazepam 1 mg Q2H PRN   Or     LORazepam 2 mg Q2H PRN     VTE Pharmacologic Prophylaxis: Heparin  VTE Mechanical Prophylaxis: sequential compression device  Invasive lines and devices:   Invasive Devices     Peripheral Intravenous Line            Peripheral IV 05/15/18 Left Antecubital 1 day    Peripheral IV 05/15/18 Right Antecubital 1 day          Drain            Urethral Catheter 16 Fr  1 day                     Pedro Luis Patel DO

## 2018-05-17 NOTE — RESPIRATORY THERAPY NOTE
RT Ventilator Management Note  Verito Martinez 56 y o  male MRN: [id number]  Unit/Bed#: MICU 06 Encounter: [id number]      Daily Screen     No data found              Physical Exam:          Resp Comments: (P) jpt appears comfortable on bipap, pt awake, alert, following commands, will continue to monitor

## 2018-05-17 NOTE — SOCIAL WORK
Pt on bi-pap and resting  CM spoke with pt daughter Belkis Coulter [phone]  Pt resides with his daughter and her 3 adult children  Pt has 0 KAREN  Bedroom on 1st floor and bathroom on 2nd floor  Pt independent with ADLs and ambulation PTA  Pt does not work and is able to drive  Pt daughter works full time  Pt has hx of VNA  No hx of STR  No hx of MH  No hx of drug abuse  Julia Baltazar reports pt drinks a bottle of liquor daily - no hx of treatment  Pt has no POA or LW  Pharmacy is [facility]  CM to follow  CM reviewed d/c planning process including the following: identifying help at home, patient preference for d/c planning needs, Discharge Lounge, [facility] Meds to Bed program, availability of treatment team to discuss questions or concerns patient and/or family may have regarding understanding medications and recognizing signs and symptoms once discharged  CM also encouraged patient to follow up with all recommended appointments after discharge  Patient advised of importance for patient and family to participate in managing patients medical well being

## 2018-05-17 NOTE — SPEECH THERAPY NOTE
Speech Language/Pathology  Speech/Language Pathology  Assessment    Patient Name: Nicol Coker  Today's Date: 5/17/2018     Problem List  Patient Active Problem List   Diagnosis    Colitis    Spinal stenosis of cervical region    Hypertension    Pericarditis    Coronary artery disease involving native coronary artery of native heart    Leukocytosis    Asthma    Atherosclerosis of coronary artery    [facility] with exacerbation [facility] [address]    Dyslipidemia    Shock ([facility] [address] [facility]    Lactic acidosis    High anion gap metabolic acidosis    Acute kidney failure (HCC)    Transaminitis    Elevated troponin    Hypokalemia    Hyponatremia    Alcohol abuse    Tobacco abuse    Alcohol dependence with withdrawal (HCC)     Past Medical History  Past Medical History:   Diagnosis Date    Asthma     Cardiac disease     Colitis     Colon polyps     COPD (chronic obstructive pulmonary disease) (HCC)     Coronary artery disease     Diverticulitis     Hyperlipidemia     Hypertension     Myocardial infarction ([facility] [address] [facility]     Ulcerative colitis ([facility] [address] )      Past Surgical History  Past Surgical History:   Procedure Laterality Date    ANGIOPLASTY      COLON SURGERY      COLONOSCOPY N/A 10/24/2016    Procedure: COLONOSCOPY;  Surgeon: Ganga Torres MD;  [facility]: [facility]; Service:     CORONARY ANGIOPLASTY WITH STENT PLACEMENT      ESOPHAGOGASTRODUODENOSCOPY N/A 10/24/2016    Procedure: ESOPHAGOGASTRODUODENOSCOPY (EGD); Surgeon: Ganga Torres MD;  Location: [facility]; Service:     [place] COLONOSCOPY FLX DX W/COLLJ [address] WHEN PFRMD N/A 2/6/2018    Procedure: COLONOSCOPY;  Surgeon: Ganga Torres MD;  Location: [facility]; Service: Colorectal    TONSILLECTOMY     Summary:  Pt presents w/ mild oropharyngeal dysphagia yael by decreased bolus control & coordinating of breathing & swallowing       Recommendations:  Diet: level 2 mech altered  Liquid: thin, controlled sips  Meds: whole in puree, or crush  Supervision: full  Positioning:Upright  Strategies: Pt to take PO/Meds only when fully alert and upright  Oral care: frequently  Aspiration precautions  Therapy Prognosis: fair    56 y o  male who presents on 05/15 from home with complaint of malaise and weakness as well as incontinence of stool/urine due to inability to get off his couch  He has a past medical history significant for COPD, hypertension, coronary artery disease status post PCI to RCA in 2012, history of a right hemicolectomy secondary to unknown reason, alcohol abuse, tobacco abuse and ulcerative colitis/Crohn's  Per the patient he has had several episodes of emesis daily for at least the past month  He denies any increasing diarrhea or nausea, reports shortness of breath is at baseline, denies any fevers, chest pain, any difficulties voiding and reports urinating 3 to 4 times a day, he does endorse decreased appetite eating approximately 2 meals a day, he reports smoking cigars for 3-4 hours daily, drinking at least 1 bottle of kenji a day  In regards to his alcohol abuse he states that he has not stopped drinking for any significant amount of time and is unclear regarding withdrawal symptoms or seizures  In the emergency room he was significantly short of breath and received an extended nebulizer treatment with good effect  He was profoundly hypotensive and was resuscitated with 3 L of crystalloid, and ultimately started on norepinephrine  He had an acute kidney injury, significant metabolic derangements, an elevated troponin, and a lactic acidosis  For this reason he is being referred to the critical care unit for admission    CXR-Very small left effusion with bibasilar subsegmental atelectasis       Reason for consult:  R/o aspiration  Determine safest and least restrictive diet  Failed nursing dysphagia assessment    Precautions:  Contact    Current diet:  npo  Premorbid diet[de-identified]  Unknown, presumed regular   Previous VBS:  -  O2 requirement:  nc  Voice/Speech:  Low volume, mild hoarse  Social:  Pt states he lives at home w/ dtr  Follows commands: For oral [facility]      Cognitive Status:  Alert, cooperative   Oral [facility] exam:  Partial dentition  Poor condition  Full symmetry-?able, pt's beard & mustache cover his face/neck    Items administered:  Puree, soft solid,  nectar thick liquid, thin liquids, meds whole/crushed in applesauce/water  Liquids were taken by straw/cup  Oral stage:  Lip closure: wfl  Mastication: mildly prolonged  Bolus formation: fair  Bolus control: fair  Transfer: mildly reduced, pt is breathing heavily, struggling w/ coordinating breathing w/s wallowing  Oral residue: mild lingual residue  Pharyngeal stage:  Swallow promptness: fairly prompt  Laryngeal rise: min reduced  Wet voice: -  Throat clear:-  Cough: no immed coughing  Delayed coughing x1 only    Esophageal stage:  No s/s reported  Aspiration precautions posted    Results d/w:  Pt, nursing, family, physician, dietician    Goal(s):  Pt will tolerate least restrictive diet w/out s/s aspiration or oral/pharyngeal difficulties

## 2018-05-17 NOTE — PROGRESS NOTES
05/17/18 1400   Plan of Care   Comments Attempted visit   PT sleeping   Assessment Completed by: Unit visit

## 2018-05-17 NOTE — PROGRESS NOTES
Discussed with speech pathologist Juan Alcazar- okay to start dysphagia level 2 diet with thin liquids and small sips

## 2018-05-17 NOTE — RESTORATIVE TECHNICIAN NOTE
Restorative Specialist Mobility Note       Activity: Dangle, Stand at bedside, Other (Comment) (Educated/encouraged pt to sit EOB with assistance  Bed alarm on   Pt callbell, phone/tray within reach )     Assistive Device: Other (Comment) (Assist x1-2 to sit pt at the EOB/stand x1 and do ROM exercises at the EOB)        Range of Motion: All extremities          Kourtney GALAN, Restorative Technician, [employer]

## 2018-05-18 LAB
ALBUMIN SERPL BCP-MCNC: 2.1 G/DL (ref 3.5–5)
ALP SERPL-CCNC: 152 U/L (ref 46–116)
ALT SERPL W P-5'-P-CCNC: 46 U/L (ref 12–78)
AMMONIA PLAS-SCNC: 64 UMOL/L (ref 11–35)
ANION GAP SERPL CALCULATED.3IONS-SCNC: 6 MMOL/L (ref 4–13)
ANION GAP SERPL CALCULATED.3IONS-SCNC: 8 MMOL/L (ref 4–13)
AST SERPL W P-5'-P-CCNC: 63 U/L (ref 5–45)
BASE EXCESS BLDA CALC-SCNC: 6 MMOL/L (ref -2–3)
BASOPHILS # BLD AUTO: 0 THOUSANDS/ΜL (ref 0–0.1)
BASOPHILS # BLD AUTO: 0.03 THOUSANDS/ΜL (ref 0–0.1)
BASOPHILS NFR BLD AUTO: 0 % (ref 0–1)
BASOPHILS NFR BLD AUTO: 0 % (ref 0–1)
BILIRUB SERPL-MCNC: 0.46 MG/DL (ref 0.2–1)
BUN SERPL-MCNC: 14 MG/DL (ref 5–25)
BUN SERPL-MCNC: 16 MG/DL (ref 5–25)
CA-I BLD-SCNC: 0.97 MMOL/L (ref 1.12–1.32)
CALCIUM SERPL-MCNC: 6.3 MG/DL (ref 8.3–10.1)
CALCIUM SERPL-MCNC: 6.8 MG/DL (ref 8.3–10.1)
CHLORIDE SERPL-SCNC: 100 MMOL/L (ref 100–108)
CHLORIDE SERPL-SCNC: 104 MMOL/L (ref 100–108)
CO2 SERPL-SCNC: 27 MMOL/L (ref 21–32)
CO2 SERPL-SCNC: 30 MMOL/L (ref 21–32)
CREAT SERPL-MCNC: 0.8 MG/DL (ref 0.6–1.3)
CREAT SERPL-MCNC: 0.84 MG/DL (ref 0.6–1.3)
EOSINOPHIL # BLD AUTO: 0 THOUSAND/ΜL (ref 0–0.61)
EOSINOPHIL # BLD AUTO: 0 THOUSAND/ΜL (ref 0–0.61)
EOSINOPHIL NFR BLD AUTO: 0 % (ref 0–6)
EOSINOPHIL NFR BLD AUTO: 0 % (ref 0–6)
ERYTHROCYTE [DISTWIDTH] IN BLOOD BY AUTOMATED COUNT: 16.1 % (ref 11.6–15.1)
ERYTHROCYTE [DISTWIDTH] IN BLOOD BY AUTOMATED COUNT: 16.2 % (ref 11.6–15.1)
FIO2 GAS DIL.REBREATH: 45 L
GFR SERPL CREATININE-BSD FRML MDRD: 101 ML/MIN/1.73SQ M
GFR SERPL CREATININE-BSD FRML MDRD: 99 ML/MIN/1.73SQ M
GLUCOSE SERPL-MCNC: 193 MG/DL (ref 65–140)
GLUCOSE SERPL-MCNC: 202 MG/DL (ref 65–140)
GLUCOSE SERPL-MCNC: 202 MG/DL (ref 65–140)
GLUCOSE SERPL-MCNC: 228 MG/DL (ref 65–140)
GLUCOSE SERPL-MCNC: 266 MG/DL (ref 65–140)
HCO3 BLDA-SCNC: 32.5 MMOL/L (ref 22–28)
HCT VFR BLD AUTO: 33.1 % (ref 36.5–49.3)
HCT VFR BLD AUTO: 37.2 % (ref 36.5–49.3)
HCT VFR BLD CALC: 35 % (ref 36.5–49.3)
HGB BLD-MCNC: 11.4 G/DL (ref 12–17)
HGB BLD-MCNC: 12.5 G/DL (ref 12–17)
HGB BLDA-MCNC: 11.9 G/DL (ref 12–17)
LYMPHOCYTES # BLD AUTO: 0.36 THOUSANDS/ΜL (ref 0.6–4.47)
LYMPHOCYTES # BLD AUTO: 0.77 THOUSANDS/ΜL (ref 0.6–4.47)
LYMPHOCYTES NFR BLD AUTO: 5 % (ref 14–44)
LYMPHOCYTES NFR BLD AUTO: 5 % (ref 14–44)
MAGNESIUM SERPL-MCNC: 2.5 MG/DL (ref 1.6–2.6)
MAGNESIUM SERPL-MCNC: 2.6 MG/DL (ref 1.6–2.6)
MCH RBC QN AUTO: 32.9 PG (ref 26.8–34.3)
MCH RBC QN AUTO: 33.1 PG (ref 26.8–34.3)
MCHC RBC AUTO-ENTMCNC: 33.6 G/DL (ref 31.4–37.4)
MCHC RBC AUTO-ENTMCNC: 34.4 G/DL (ref 31.4–37.4)
MCV RBC AUTO: 96 FL (ref 82–98)
MCV RBC AUTO: 98 FL (ref 82–98)
MONOCYTES # BLD AUTO: 0.36 THOUSAND/ΜL (ref 0.17–1.22)
MONOCYTES # BLD AUTO: 1.07 THOUSAND/ΜL (ref 0.17–1.22)
MONOCYTES NFR BLD AUTO: 5 % (ref 4–12)
MONOCYTES NFR BLD AUTO: 7 % (ref 4–12)
NEUTROPHILS # BLD AUTO: 13.06 THOUSANDS/ΜL (ref 1.85–7.62)
NEUTROPHILS # BLD AUTO: 6.16 THOUSANDS/ΜL (ref 1.85–7.62)
NEUTS SEG NFR BLD AUTO: 87 % (ref 43–75)
NEUTS SEG NFR BLD AUTO: 89 % (ref 43–75)
NRBC BLD AUTO-RTO: 0 /100 WBCS
NRBC BLD AUTO-RTO: 1 /100 WBCS
PCO2 BLD: 34 MMOL/L (ref 21–32)
PCO2 BLD: 52.6 MM HG (ref 36–44)
PH BLD: 7.4 [PH] (ref 7.35–7.45)
PHOSPHATE SERPL-MCNC: 0.7 MG/DL (ref 2.7–4.5)
PHOSPHATE SERPL-MCNC: 1.7 MG/DL (ref 2.7–4.5)
PLATELET # BLD AUTO: 48 THOUSANDS/UL (ref 149–390)
PLATELET # BLD AUTO: 76 THOUSANDS/UL (ref 149–390)
PMV BLD AUTO: 11 FL (ref 8.9–12.7)
PMV BLD AUTO: 11.7 FL (ref 8.9–12.7)
PO2 BLD: 68 MM HG (ref 75–129)
POTASSIUM BLD-SCNC: 3.1 MMOL/L (ref 3.5–5.3)
POTASSIUM SERPL-SCNC: 3.2 MMOL/L (ref 3.5–5.3)
POTASSIUM SERPL-SCNC: 3.7 MMOL/L (ref 3.5–5.3)
PROT SERPL-MCNC: 5.2 G/DL (ref 6.4–8.2)
RBC # BLD AUTO: 3.44 MILLION/UL (ref 3.88–5.62)
RBC # BLD AUTO: 3.8 MILLION/UL (ref 3.88–5.62)
SAO2 % BLD FROM PO2: 93 % (ref 95–98)
SODIUM BLD-SCNC: 140 MMOL/L (ref 136–145)
SODIUM SERPL-SCNC: 135 MMOL/L (ref 136–145)
SODIUM SERPL-SCNC: 140 MMOL/L (ref 136–145)
SPECIMEN SOURCE: ABNORMAL
TROPONIN I SERPL-MCNC: 0.32 NG/ML
TROPONIN I SERPL-MCNC: 0.32 NG/ML
WBC # BLD AUTO: 15.07 THOUSAND/UL (ref 4.31–10.16)
WBC # BLD AUTO: 6.91 THOUSAND/UL (ref 4.31–10.16)

## 2018-05-18 PROCEDURE — 82140 ASSAY OF AMMONIA: CPT | Performed by: FAMILY MEDICINE

## 2018-05-18 PROCEDURE — 85025 COMPLETE CBC W/AUTO DIFF WBC: CPT | Performed by: FAMILY MEDICINE

## 2018-05-18 PROCEDURE — 80053 COMPREHEN METABOLIC PANEL: CPT | Performed by: FAMILY MEDICINE

## 2018-05-18 PROCEDURE — 82330 ASSAY OF CALCIUM: CPT

## 2018-05-18 PROCEDURE — 84100 ASSAY OF PHOSPHORUS: CPT | Performed by: FAMILY MEDICINE

## 2018-05-18 PROCEDURE — 82948 REAGENT STRIP/BLOOD GLUCOSE: CPT

## 2018-05-18 PROCEDURE — 94660 CPAP INITIATION&MGMT: CPT

## 2018-05-18 PROCEDURE — 94640 AIRWAY INHALATION TREATMENT: CPT

## 2018-05-18 PROCEDURE — 85014 HEMATOCRIT: CPT

## 2018-05-18 PROCEDURE — 99291 CRITICAL CARE FIRST HOUR: CPT | Performed by: INTERNAL MEDICINE

## 2018-05-18 PROCEDURE — 99233 SBSQ HOSP IP/OBS HIGH 50: CPT | Performed by: INTERNAL MEDICINE

## 2018-05-18 PROCEDURE — 83735 ASSAY OF MAGNESIUM: CPT | Performed by: PHYSICIAN ASSISTANT

## 2018-05-18 PROCEDURE — 84484 ASSAY OF TROPONIN QUANT: CPT | Performed by: PHYSICIAN ASSISTANT

## 2018-05-18 PROCEDURE — 84132 ASSAY OF SERUM POTASSIUM: CPT

## 2018-05-18 PROCEDURE — 83735 ASSAY OF MAGNESIUM: CPT | Performed by: FAMILY MEDICINE

## 2018-05-18 PROCEDURE — 82947 ASSAY GLUCOSE BLOOD QUANT: CPT

## 2018-05-18 PROCEDURE — 94760 N-INVAS EAR/PLS OXIMETRY 1: CPT

## 2018-05-18 PROCEDURE — 93005 ELECTROCARDIOGRAM TRACING: CPT

## 2018-05-18 PROCEDURE — 5A2204Z RESTORATION OF CARDIAC RHYTHM, SINGLE: ICD-10-PCS | Performed by: ANESTHESIOLOGY

## 2018-05-18 PROCEDURE — 85025 COMPLETE CBC W/AUTO DIFF WBC: CPT | Performed by: PHYSICIAN ASSISTANT

## 2018-05-18 PROCEDURE — 80048 BASIC METABOLIC PNL TOTAL CA: CPT | Performed by: PHYSICIAN ASSISTANT

## 2018-05-18 PROCEDURE — 82803 BLOOD GASES ANY COMBINATION: CPT

## 2018-05-18 PROCEDURE — 84100 ASSAY OF PHOSPHORUS: CPT | Performed by: PHYSICIAN ASSISTANT

## 2018-05-18 PROCEDURE — 84295 ASSAY OF SERUM SODIUM: CPT

## 2018-05-18 RX ORDER — ADENOSINE 3 MG/ML
INJECTION, SOLUTION INTRAVENOUS
Status: COMPLETED
Start: 2018-05-18 | End: 2018-05-18

## 2018-05-18 RX ORDER — FENTANYL CITRATE 50 UG/ML
INJECTION, SOLUTION INTRAMUSCULAR; INTRAVENOUS
Status: COMPLETED
Start: 2018-05-18 | End: 2018-05-18

## 2018-05-18 RX ORDER — METOPROLOL TARTRATE 5 MG/5ML
5 INJECTION INTRAVENOUS ONCE
Status: COMPLETED | OUTPATIENT
Start: 2018-05-18 | End: 2018-05-18

## 2018-05-18 RX ORDER — POTASSIUM CHLORIDE 20 MEQ/1
40 TABLET, EXTENDED RELEASE ORAL ONCE
Status: COMPLETED | OUTPATIENT
Start: 2018-05-18 | End: 2018-05-18

## 2018-05-18 RX ORDER — DIGOXIN 0.25 MG/ML
125 INJECTION INTRAMUSCULAR; INTRAVENOUS EVERY 6 HOURS
Status: DISCONTINUED | OUTPATIENT
Start: 2018-05-19 | End: 2018-05-18

## 2018-05-18 RX ORDER — ADENOSINE 3 MG/ML
12 INJECTION INTRAVENOUS ONCE
Status: COMPLETED | OUTPATIENT
Start: 2018-05-18 | End: 2018-05-18

## 2018-05-18 RX ORDER — MIDAZOLAM HYDROCHLORIDE 1 MG/ML
INJECTION INTRAMUSCULAR; INTRAVENOUS
Status: COMPLETED
Start: 2018-05-18 | End: 2018-05-18

## 2018-05-18 RX ORDER — ADENOSINE 3 MG/ML
6 INJECTION INTRAVENOUS ONCE
Status: COMPLETED | OUTPATIENT
Start: 2018-05-18 | End: 2018-05-18

## 2018-05-18 RX ORDER — AMIODARONE HYDROCHLORIDE 50 MG/ML
INJECTION, SOLUTION INTRAVENOUS
Status: COMPLETED
Start: 2018-05-18 | End: 2018-05-18

## 2018-05-18 RX ORDER — POTASSIUM CHLORIDE 14.9 MG/ML
20 INJECTION INTRAVENOUS ONCE
Status: COMPLETED | OUTPATIENT
Start: 2018-05-18 | End: 2018-05-18

## 2018-05-18 RX ORDER — DILTIAZEM HYDROCHLORIDE 5 MG/ML
INJECTION INTRAVENOUS
Status: COMPLETED
Start: 2018-05-18 | End: 2018-05-18

## 2018-05-18 RX ORDER — DIGOXIN 0.25 MG/ML
250 INJECTION INTRAMUSCULAR; INTRAVENOUS ONCE
Status: COMPLETED | OUTPATIENT
Start: 2018-05-18 | End: 2018-05-18

## 2018-05-18 RX ORDER — DILTIAZEM HYDROCHLORIDE 5 MG/ML
10 INJECTION INTRAVENOUS ONCE
Status: COMPLETED | OUTPATIENT
Start: 2018-05-18 | End: 2018-05-18

## 2018-05-18 RX ORDER — DIGOXIN 0.25 MG/ML
250 INJECTION INTRAMUSCULAR; INTRAVENOUS EVERY 6 HOURS
Status: COMPLETED | OUTPATIENT
Start: 2018-05-18 | End: 2018-05-19

## 2018-05-18 RX ADMIN — NICOTINE 14 MG: 14 PATCH, EXTENDED RELEASE TRANSDERMAL at 08:44

## 2018-05-18 RX ADMIN — DIBASIC SODIUM PHOSPHATE, MONOBASIC POTASSIUM PHOSPHATE AND MONOBASIC SODIUM PHOSPHATE 2 TABLET: 852; 155; 130 TABLET ORAL at 11:29

## 2018-05-18 RX ADMIN — SODIUM CHLORIDE, SODIUM GLUCONATE, SODIUM ACETATE, POTASSIUM CHLORIDE, MAGNESIUM CHLORIDE, SODIUM PHOSPHATE, DIBASIC, AND POTASSIUM PHOSPHATE 125 ML/HR: .53; .5; .37; .037; .03; .012; .00082 INJECTION, SOLUTION INTRAVENOUS at 01:30

## 2018-05-18 RX ADMIN — LEVALBUTEROL HYDROCHLORIDE 1.25 MG: 1.25 SOLUTION, CONCENTRATE RESPIRATORY (INHALATION) at 20:15

## 2018-05-18 RX ADMIN — MIDAZOLAM 2 MG: 1 INJECTION INTRAMUSCULAR; INTRAVENOUS at 19:00

## 2018-05-18 RX ADMIN — Medication 0.7 MCG/KG/HR: at 02:21

## 2018-05-18 RX ADMIN — OXAZEPAM 15 MG: 15 CAPSULE, GELATIN COATED ORAL at 00:44

## 2018-05-18 RX ADMIN — ADENOSINE 6 MG: 3 INJECTION INTRAVENOUS at 18:50

## 2018-05-18 RX ADMIN — HEPARIN SODIUM 5000 UNITS: 5000 INJECTION, SOLUTION INTRAVENOUS; SUBCUTANEOUS at 06:26

## 2018-05-18 RX ADMIN — SODIUM CHLORIDE, SODIUM GLUCONATE, SODIUM ACETATE, POTASSIUM CHLORIDE, MAGNESIUM CHLORIDE, SODIUM PHOSPHATE, DIBASIC, AND POTASSIUM PHOSPHATE 125 ML/HR: .53; .5; .37; .037; .03; .012; .00082 INJECTION, SOLUTION INTRAVENOUS at 09:46

## 2018-05-18 RX ADMIN — IPRATROPIUM BROMIDE 0.5 MG: 0.5 SOLUTION RESPIRATORY (INHALATION) at 20:16

## 2018-05-18 RX ADMIN — METHYLPREDNISOLONE SODIUM SUCCINATE 40 MG: 40 INJECTION, POWDER, FOR SOLUTION INTRAMUSCULAR; INTRAVENOUS at 21:33

## 2018-05-18 RX ADMIN — AMIODARONE HYDROCHLORIDE 150 MG: 50 INJECTION, SOLUTION INTRAVENOUS at 18:57

## 2018-05-18 RX ADMIN — DILTIAZEM HYDROCHLORIDE 10 MG: 5 INJECTION INTRAVENOUS at 19:39

## 2018-05-18 RX ADMIN — INSULIN LISPRO 6 UNITS: 100 INJECTION, SOLUTION INTRAVENOUS; SUBCUTANEOUS at 13:53

## 2018-05-18 RX ADMIN — DIBASIC SODIUM PHOSPHATE, MONOBASIC POTASSIUM PHOSPHATE AND MONOBASIC SODIUM PHOSPHATE 2 TABLET: 852; 155; 130 TABLET ORAL at 21:34

## 2018-05-18 RX ADMIN — IPRATROPIUM BROMIDE 0.5 MG: 0.5 SOLUTION RESPIRATORY (INHALATION) at 00:44

## 2018-05-18 RX ADMIN — ADENOSINE 12 MG: 3 INJECTION, SOLUTION INTRAVENOUS at 19:16

## 2018-05-18 RX ADMIN — LEVALBUTEROL HYDROCHLORIDE 1.25 MG: 1.25 SOLUTION, CONCENTRATE RESPIRATORY (INHALATION) at 00:44

## 2018-05-18 RX ADMIN — LEVALBUTEROL HYDROCHLORIDE 1.25 MG: 1.25 SOLUTION, CONCENTRATE RESPIRATORY (INHALATION) at 12:19

## 2018-05-18 RX ADMIN — IPRATROPIUM BROMIDE 0.5 MG: 0.5 SOLUTION RESPIRATORY (INHALATION) at 07:39

## 2018-05-18 RX ADMIN — DILTIAZEM HYDROCHLORIDE 5 MG/HR: 5 INJECTION INTRAVENOUS at 19:57

## 2018-05-18 RX ADMIN — HEPARIN SODIUM 5000 UNITS: 5000 INJECTION, SOLUTION INTRAVENOUS; SUBCUTANEOUS at 21:34

## 2018-05-18 RX ADMIN — BUDESONIDE 0.5 MG: 0.5 INHALANT RESPIRATORY (INHALATION) at 20:16

## 2018-05-18 RX ADMIN — DIGOXIN 250 MCG: 0.25 INJECTION INTRAMUSCULAR; INTRAVENOUS at 19:37

## 2018-05-18 RX ADMIN — LACTULOSE 20 G: 20 SOLUTION ORAL at 08:44

## 2018-05-18 RX ADMIN — METHYLPREDNISOLONE SODIUM SUCCINATE 40 MG: 40 INJECTION, POWDER, FOR SOLUTION INTRAMUSCULAR; INTRAVENOUS at 13:57

## 2018-05-18 RX ADMIN — FENTANYL CITRATE 100 MCG: 50 INJECTION, SOLUTION INTRAMUSCULAR; INTRAVENOUS at 18:54

## 2018-05-18 RX ADMIN — OXAZEPAM 15 MG: 15 CAPSULE, GELATIN COATED ORAL at 17:15

## 2018-05-18 RX ADMIN — AMIODARONE HYDROCHLORIDE 1 MG/MIN: 50 INJECTION, SOLUTION INTRAVENOUS at 19:17

## 2018-05-18 RX ADMIN — METOPROLOL TARTRATE 5 MG: 5 INJECTION, SOLUTION INTRAVENOUS at 19:30

## 2018-05-18 RX ADMIN — ADENOSINE 6 MG: 3 INJECTION, SOLUTION INTRAVENOUS at 18:50

## 2018-05-18 RX ADMIN — Medication 0.7 MCG/KG/HR: at 06:57

## 2018-05-18 RX ADMIN — METHYLPREDNISOLONE SODIUM SUCCINATE 40 MG: 40 INJECTION, POWDER, FOR SOLUTION INTRAMUSCULAR; INTRAVENOUS at 06:29

## 2018-05-18 RX ADMIN — HEPARIN SODIUM 5000 UNITS: 5000 INJECTION, SOLUTION INTRAVENOUS; SUBCUTANEOUS at 13:57

## 2018-05-18 RX ADMIN — POTASSIUM CHLORIDE 40 MEQ: 1500 TABLET, EXTENDED RELEASE ORAL at 20:45

## 2018-05-18 RX ADMIN — POTASSIUM CHLORIDE 20 MEQ: 200 INJECTION, SOLUTION INTRAVENOUS at 19:40

## 2018-05-18 RX ADMIN — INSULIN LISPRO 3 UNITS: 100 INJECTION, SOLUTION INTRAVENOUS; SUBCUTANEOUS at 00:43

## 2018-05-18 RX ADMIN — OXAZEPAM 15 MG: 15 CAPSULE, GELATIN COATED ORAL at 06:27

## 2018-05-18 RX ADMIN — POTASSIUM CHLORIDE 40 MEQ: 1500 TABLET, EXTENDED RELEASE ORAL at 21:34

## 2018-05-18 RX ADMIN — Medication 150 MG: at 19:39

## 2018-05-18 RX ADMIN — OXAZEPAM 15 MG: 15 CAPSULE, GELATIN COATED ORAL at 11:29

## 2018-05-18 RX ADMIN — INSULIN LISPRO 2 UNITS: 100 INJECTION, SOLUTION INTRAVENOUS; SUBCUTANEOUS at 06:25

## 2018-05-18 RX ADMIN — POTASSIUM CHLORIDE 40 MEQ: 1500 TABLET, EXTENDED RELEASE ORAL at 08:44

## 2018-05-18 RX ADMIN — BUDESONIDE 0.5 MG: 0.5 INHALANT RESPIRATORY (INHALATION) at 07:39

## 2018-05-18 RX ADMIN — Medication 0.5 MCG/KG/HR: at 11:32

## 2018-05-18 RX ADMIN — DIBASIC SODIUM PHOSPHATE, MONOBASIC POTASSIUM PHOSPHATE AND MONOBASIC SODIUM PHOSPHATE 2 TABLET: 852; 155; 130 TABLET ORAL at 17:15

## 2018-05-18 RX ADMIN — LEVALBUTEROL HYDROCHLORIDE 1.25 MG: 1.25 SOLUTION, CONCENTRATE RESPIRATORY (INHALATION) at 07:39

## 2018-05-18 RX ADMIN — IPRATROPIUM BROMIDE 0.5 MG: 0.5 SOLUTION RESPIRATORY (INHALATION) at 12:19

## 2018-05-18 NOTE — RESPIRATORY THERAPY NOTE
Resp Management Note  Mansi Kwan 56 y o  male MRN: [id number]  Unit/Bed#: [facility] 06 Encounter: [id number]      Daily Screen     No data found              Physical Exam:   Assessment Type: Assess only  General Appearance: Sleeping  Respiratory Pattern: Normal  Chest Assessment: Chest expansion symmetrical  Bilateral Breath Sounds: Diminished  O2 Device: bipap       Resp Comments: con't on bipap as ordered 14/5 40%, josiane well, no resp distress

## 2018-05-18 NOTE — PROGRESS NOTES
Progress Note - Critical Care   Gopal Ra 56 y o  male MRN: [id number]  Unit/Bed#: MICU 06 Encounter: [id number]          ______________________________________________________________________  Assessment and Plan:   Patient Active Problem List   Diagnosis    Colitis    Spinal stenosis of cervical region    Hypertension    Pericarditis    Coronary artery disease involving native coronary artery of native heart    Leukocytosis    Asthma    Atherosclerosis of coronary artery    [facility]ation ([facility]    Dyslipidemia    [facility]    Lactic acidosis    High anion gap metabolic acidosis    Acute kidney failure (HCC)    Transaminitis    Elevated troponin    Hypokalemia    Hyponatremia    Alcohol abuse    Tobacco abuse    Alcohol dependence with withdrawal ([facility] )      Neuro:   - Acute encephalopathy secondary to alcohol withdrawal- Significantly improved, continue Serax 15 mg with CIWA protocol, no PRN medications required overnight  Precedex at 0 7 mcg/kg/hr  Seroquel and Haldol not started due to prolonged QTc        CV:   - Elevated troponin likely type 2 NSTEMI- Troponin trended down 1 24-1  15  - Tachycardia- Resolved, likely secondary to alcohol withdrawal      Pulm:   - Acute on chronic COPD- Improved, BiPAP overnight, stable on 4L NC this morning  Continue Xopenex and Atrovent q6, Pulmicort BID, and Solumedrol 40 mg q8       GI:   - History of Crohn's with likely acute flare- Abdominal exam unchanged, afebrile with no leukocytosis  C diff negative       :    - DEBBY- Resolved, Cr 0 84, consider stopping IVF hydration with PO intake       F/E/N:   - Consider stopping IVF hydration at 125 mL/hr   - K 3 7 this AM, repleted  - Dysphagia level 2 diet      ID:  - Shock likely secondary to hypovolemia, unlikely infectious surce- Procalcitonin 0 13, blood cultures x2 no growth 48 hours   Antibiotics discontinued      Heme:   - Thrombocytopenia- Platelets 92->82 this morning, has been trend over past couple of days  Will continue to monitor  Consider Heme/Onc consult    - Continue Heparin DVT prophylaxis     Endo:   - Continue SSI#3                Msk/Skin:   - Continue routine skin care with frequent repositioning      Disposition: Possible transfer to Stepdown Level 2         Code Status: Level 1 - Full Code    ______________________________________________________________________    Chief Complaint: No complaints, admits to itching and crawling feeling in his legs      24 Hour Events: None, stable on BiPAP overnight       ______________________________________________________________________    Physical Exam:       Physical Exam   Constitutional: He is oriented to person, place, and time  He appears well-developed and well-nourished  No distress  HENT:   Head: Normocephalic and atraumatic  Neck: Normal range of motion  Neck supple  Cardiovascular: Normal rate, regular rhythm, normal heart sounds and intact distal pulses  No murmur heard  Pulmonary/Chest: Effort normal  No respiratory distress  He has wheezes  He has no rales  Diminished breath sounds at bases with diffuse end-expiratory wheezing    Abdominal: Soft  Bowel sounds are normal  He exhibits distension  There is no tenderness  There is no rebound and no guarding  Obese abdomen, distended, no rebound or guarding    Musculoskeletal: He exhibits no edema  Neurological: He is alert and oriented to person, place, and time  No cranial nerve deficit  He exhibits normal muscle tone  Tremulous with arm extension    Skin: Skin is warm  He is not diaphoretic  Psychiatric: He has a normal mood and affect  Thought content normal    Vitals reviewed      ______________________________________________________________________  Vitals:    05/18/18 0500 05/18/18 0600 05/18/18 0618 05/18/18 0634   BP: 128/87  121/72    Pulse: 70  74    Resp: (!) 10  14    Temp:       TempSrc:       SpO2: 100%  94%    Weight:  85 5 kg (188 lb 7 9 oz)  85 5 kg (188 lb 7 9 oz)   Height:                  Temperature:   Temp (24hrs), Av 3 °F (36 8 °C), Min:97 5 °F (36 4 °C), Max:99 7 °F (37 6 °C)    Current Temperature: 99 7 °F (37 6 °C)  Weights:   IBW: 63 8 kg    Body mass index is 30 42 kg/m²  Weight (last 2 days)     Date/Time   Weight    18 0634  85 5 (188 49)    18 0600  85 5 (188 49)    18 0600  76 5 (168 65)            Hemodynamic Monitoring:  N/A     Non-Invasive/Invasive Ventilation Settings:  Respiratory    Lab Data (Last 4 hours)    None         O2/Vent Data (Last 4 hours)       0327          Non-Invasive Ventilation Mode BiPAP                 No results found for: PHART, NDJ4GAY, PO2ART, DGL9TSC, M7BKLAPJ, BEART, SOURCE  SpO2: SpO2: 94 %, SpO2 Activity: SpO2 Activity: At Rest, SpO2 Device: O2 Device: Nasal cannula, Capnography:    Intake and Outputs:  I/O        07 -  0700  07 -  0700  07 -  0700    I V  (mL/kg) 3311 5 (43 3) 2511 8 (29 4)     IV Piggyback 600      Total Intake(mL/kg) 3911 5 (51 1) 2511 8 (29 4)     Urine (mL/kg/hr) 1085 (0 6) 1360 (0 7)     Stool 0 (0) 0 (0)     Total Output 1085 1360      Net +2826 5 +1151 8             Unmeasured Urine Occurrence  2 x     Unmeasured Stool Occurrence 2 x 4 x         UOP: 50/hour   Nutrition:        Diet Orders            Start     Ordered    18 1505  Diet Dysphagia/Modified Consistency; Dysphagia 2-Mechanical Soft; Dysphagia 2-Mechanical Soft; Thin Liquid  Diet effective now     Question Answer Comment   Diet Type Dysphagia/Modified Consistency    Dysphagia/Modified Consistency Dysphagia 2-Mechanical Soft    Other Restriction(s): Dysphagia 2-Mechanical Soft    Liquid Modifier Thin Liquid    RD to adjust diet per protocol?  Yes        18 1507          Labs:     Results from last 7 days  Lab Units 18  0457 18  0418 18  0522  05/15/18  1304   WBC Thousand/uL 6 91 7 88 13 97*  --  10 21*   HEMOGLOBIN g/dL 11 4* 11 3* 12 4  --  14 0   I STAT HEMOGLOBIN   --   --   --   < >  --    HEMATOCRIT % 33 1* 33 4* 36 3*  --  39 5   PLATELETS Thousands/uL 48* 64* 78*  < > 61*   NEUTROS PCT % 89*  --   --   --  83*   MONOS PCT % 5  --   --   --  9   MONO PCT MAN %  --  1* 0*  --   --    < > = values in this interval not displayed  Results from last 7 days  Lab Units 05/18/18  0457 05/17/18  0418 05/16/18  0522  05/15/18  1304   SODIUM mmol/L 135* 136 133*  --  131*   POTASSIUM mmol/L 3 7 4 2 4 4  --  2 8*   CHLORIDE mmol/L 100 100 97*  --  90*   CO2 mmol/L 27 28 25  --  25   BUN mg/dL 16 20 18  --  23   CREATININE mg/dL 0 84 1 11 1 80*  --  3 33*   CALCIUM mg/dL 6 3* 6 5* 6 3*  --  7 4*   TOTAL PROTEIN g/dL 5 2*  --  6 4  --  5 9*   BILIRUBIN TOTAL mg/dL 0 46  --  0 64  --  0 33   ALK PHOS U/L 152*  --  190*  --  198*   ALT U/L 46  --  71  --  76   AST U/L 63*  --  166*  --  194*   GLUCOSE RANDOM mg/dL 202* 172* 198*  --  149*   GLUCOSE, ISTAT   --   --   --   < >  --    < > = values in this interval not displayed      Results from last 7 days  Lab Units 05/18/18  0457 05/17/18  0418 05/16/18  0522   MAGNESIUM mg/dL 2 6 3 0* 1 7     Lab Results   Component Value Date    PHOS 1 7 (L) 05/18/2018    PHOS 1 4 (L) 05/17/2018    PHOS 3 5 05/16/2018        Results from last 7 days  Lab Units 05/15/18  1305   INR  1 05   PTT seconds 27       0  Lab Value Date/Time   TROPONINI 1 15 (H) 05/16/2018 1416   TROPONINI 1 24 (H) 05/16/2018 0522   TROPONINI 1 16 (H) 05/16/2018 0209   TROPONINI 1 09 (H) 05/16/2018 0053   TROPONINI 0 85 (H) 05/15/2018 1935   TROPONINI 0 72 (H) 05/15/2018 1728   TROPONINI 0 47 (H) 05/15/2018 1304   TROPONINI 0 02 12/07/2016 1931   TROPONINI 0 04 (H) 12/07/2016 0907   TROPONINI <0 04 05/22/2015 0123       Results from last 7 days  Lab Units 05/16/18  0722 05/16/18  0413 05/16/18  0209   LACTIC ACID mmol/L 1 6 2 8* 3 8*     ABG:  Lab Results   Component Value Date    PHART 7 337 (L) 05/16/2018    RPD4IXA 48 4 (H) 05/16/2018 PO2ART 92 1 05/16/2018    RYG7DSG 25 4 05/16/2018    BEART -0 8 05/16/2018    SOURCE Radial, Right 05/16/2018     Imaging:  I have personally reviewed pertinent reports  Micro:  Lab Results   Component Value Date    BLOODCX No Growth at 48 hrs  05/15/2018    BLOODCX No Growth at 48 hrs  05/15/2018     Allergies: Allergies   Allergen Reactions    Other      Brown cloth band aids        Medications:   Scheduled Meds:  Current Facility-Administered Medications:  budesonide 0 5 mg Nebulization Q12H Bulloch Matteson, PA-C    dexmedetomidine 0 1-0 7 mcg/kg/hr Intravenous Titrated Moisés Britt, PA-C Last Rate: 0 7 mcg/kg/hr (05/18/18 0657)   heparin (porcine) 5,000 Units Subcutaneous formerly [facility] AMALIA Fleming    insulin lispro 1-6 Units Subcutaneous Q6H [address] Bautista Ragsdale DO    ipratropium 0 5 mg Nebulization Q6H Bulloch Matteson, PA-C    lactulose 20 g Oral Daily Bulloch Matteson, PA-C    levalbuterol 1 25 mg Nebulization Q6H Bulloch Matteson, PA-C    LORazepam 0 5 mg Intravenous Q2H PRN AMALIA Montoya    Or        LORazepam 1 mg Intravenous Q2H PRN AMALIA Montoya    Or        LORazepam 2 mg Intravenous Q2H PRN AMALIA Montoya    methylPREDNISolone sodium succinate 40 mg Intravenous formerly [facility] AMALIA Montoya    multi-electrolyte 125 mL/hr Intravenous Continuous Bulloch Matteson, PA-C Last Rate: 125 mL/hr (05/18/18 0200)   nicotine 14 mg Transdermal Daily AMALIA Montoya    oxazepam 15 mg Oral Q6H [address] Kimmie Kline MD      Continuous Infusions:  dexmedetomidine 0 1-0 7 mcg/kg/hr Last Rate: 0 7 mcg/kg/hr (05/18/18 0657)   multi-electrolyte 125 mL/hr Last Rate: 125 mL/hr (05/18/18 0200)     PRN Meds:    LORazepam 0 5 mg Q2H PRN   Or     LORazepam 1 mg Q2H PRN   Or     LORazepam 2 mg Q2H PRN     VTE Pharmacologic Prophylaxis: Heparin  VTE Mechanical Prophylaxis: sequential compression device  Invasive lines and devices:   Invasive Devices     Peripheral Intravenous Line Peripheral IV 05/15/18 Left Antecubital 2 days    Peripheral IV 05/15/18 Right Antecubital 2 days          Drain            External Urinary Catheter Medium less than 1 day                     Keira French, DO

## 2018-05-18 NOTE — PROGRESS NOTES
05/18/18 [id number] [place] Deion Herrera of [facility], [place]   Comments PT in chair eating lunch  PT initiated conversation regarding alcohol and hope quit drinking  Discussed triggers and family/friend influences      Assessment Completed by: Unit visit

## 2018-05-18 NOTE — PLAN OF CARE
DISCHARGE PLANNING - CARE MANAGEMENT     Discharge to post-acute care or home with appropriate resources Progressing          Nutrition/Hydration-ADULT     Nutrient/Hydration intake appropriate for improving, restoring or maintaining nutritional needs Progressing        Pt on level 2 dysphagia diet and tolerating well    Potential for Falls     Patient will remain free of falls Progressing          Prexisting or High Potential for Compromised Skin Integrity     Skin integrity is maintained or improved Progressing

## 2018-05-19 ENCOUNTER — APPOINTMENT (INPATIENT)
Dept: NON INVASIVE DIAGNOSTICS | Facility: HOSPITAL | Age: 58
DRG: 710 | End: 2018-05-19
Payer: COMMERCIAL

## 2018-05-19 ENCOUNTER — APPOINTMENT (INPATIENT)
Dept: RADIOLOGY | Facility: HOSPITAL | Age: 58
DRG: 710 | End: 2018-05-19
Payer: COMMERCIAL

## 2018-05-19 PROBLEM — J98.01 BRONCHOSPASM, ACUTE: Status: ACTIVE | Noted: 2018-05-19

## 2018-05-19 PROBLEM — J96.01 ACUTE RESPIRATORY FAILURE WITH HYPOXIA (HCC): Status: ACTIVE | Noted: 2018-05-19

## 2018-05-19 PROBLEM — R45.1 AGITATION: Status: ACTIVE | Noted: 2018-05-19

## 2018-05-19 LAB
ALBUMIN SERPL BCP-MCNC: 2.5 G/DL (ref 3.5–5)
ALP SERPL-CCNC: 170 U/L (ref 46–116)
ALT SERPL W P-5'-P-CCNC: 62 U/L (ref 12–78)
ANION GAP SERPL CALCULATED.3IONS-SCNC: 6 MMOL/L (ref 4–13)
ANION GAP SERPL CALCULATED.3IONS-SCNC: 6 MMOL/L (ref 4–13)
ARTERIAL PATENCY WRIST A: YES
AST SERPL W P-5'-P-CCNC: 100 U/L (ref 5–45)
ATRIAL RATE: 107 BPM
ATRIAL RATE: 138 BPM
ATRIAL RATE: 160 BPM
ATRIAL RATE: 162 BPM
ATRIAL RATE: 223 BPM
BASE EXCESS BLDA CALC-SCNC: 3.2 MMOL/L
BASE EXCESS BLDA CALC-SCNC: 8 MMOL/L (ref -2–3)
BILIRUB SERPL-MCNC: 0.7 MG/DL (ref 0.2–1)
BUN SERPL-MCNC: 11 MG/DL (ref 5–25)
BUN SERPL-MCNC: 13 MG/DL (ref 5–25)
CA-I BLD-SCNC: 0.99 MMOL/L (ref 1.12–1.32)
CALCIUM SERPL-MCNC: 6.7 MG/DL (ref 8.3–10.1)
CALCIUM SERPL-MCNC: 7 MG/DL (ref 8.3–10.1)
CHLORIDE SERPL-SCNC: 104 MMOL/L (ref 100–108)
CHLORIDE SERPL-SCNC: 104 MMOL/L (ref 100–108)
CO2 SERPL-SCNC: 31 MMOL/L (ref 21–32)
CO2 SERPL-SCNC: 32 MMOL/L (ref 21–32)
CREAT SERPL-MCNC: 0.77 MG/DL (ref 0.6–1.3)
CREAT SERPL-MCNC: 0.84 MG/DL (ref 0.6–1.3)
DIGOXIN SERPL-MCNC: 0.7 NG/ML (ref 0.8–2)
FIO2 GAS DIL.REBREATH: 35 L
GFR SERPL CREATININE-BSD FRML MDRD: 102 ML/MIN/1.73SQ M
GFR SERPL CREATININE-BSD FRML MDRD: 99 ML/MIN/1.73SQ M
GLUCOSE SERPL-MCNC: 138 MG/DL (ref 65–140)
GLUCOSE SERPL-MCNC: 182 MG/DL (ref 65–140)
GLUCOSE SERPL-MCNC: 184 MG/DL (ref 65–140)
GLUCOSE SERPL-MCNC: 185 MG/DL (ref 65–140)
GLUCOSE SERPL-MCNC: 185 MG/DL (ref 65–140)
GLUCOSE SERPL-MCNC: 186 MG/DL (ref 65–140)
GLUCOSE SERPL-MCNC: 190 MG/DL (ref 65–140)
GLUCOSE SERPL-MCNC: 190 MG/DL (ref 65–140)
GLUCOSE SERPL-MCNC: 203 MG/DL (ref 65–140)
GLUCOSE SERPL-MCNC: 235 MG/DL (ref 65–140)
HCO3 BLDA-SCNC: 29 MMOL/L (ref 22–28)
HCO3 BLDA-SCNC: 31.6 MMOL/L (ref 24–30)
HCT VFR BLD CALC: 30 % (ref 36.5–49.3)
HGB BLDA-MCNC: 10.2 G/DL (ref 12–17)
HOROWITZ INDEX BLDA+IHG-RTO: 100 MM[HG]
MAGNESIUM SERPL-MCNC: 2.3 MG/DL (ref 1.6–2.6)
MAGNESIUM SERPL-MCNC: 2.3 MG/DL (ref 1.6–2.6)
O2 CT BLDA-SCNC: 16.2 ML/DL (ref 16–23)
OXYHGB MFR BLDA: 98.4 % (ref 94–97)
PCO2 BLD: 33 MMOL/L (ref 21–32)
PCO2 BLD: 39.1 MM HG (ref 42–50)
PCO2 BLDA: 49.5 MM HG (ref 36–44)
PEEP RESPIRATORY: 5 CM[H2O]
PH BLD: 7.52 [PH] (ref 7.3–7.4)
PH BLDA: 7.38 [PH] (ref 7.35–7.45)
PHOSPHATE SERPL-MCNC: 0.7 MG/DL (ref 2.7–4.5)
PHOSPHATE SERPL-MCNC: 0.7 MG/DL (ref 2.7–4.5)
PO2 BLD: 28 MM HG (ref 35–45)
PO2 BLDA: 284.5 MM HG (ref 75–129)
POTASSIUM BLD-SCNC: 3.4 MMOL/L (ref 3.5–5.3)
POTASSIUM SERPL-SCNC: 3.6 MMOL/L (ref 3.5–5.3)
POTASSIUM SERPL-SCNC: 3.7 MMOL/L (ref 3.5–5.3)
POTASSIUM SERPL-SCNC: 4.5 MMOL/L (ref 3.5–5.3)
PROT SERPL-MCNC: 5.7 G/DL (ref 6.4–8.2)
QRS AXIS: 110 DEGREES
QRS AXIS: 112 DEGREES
QRS AXIS: 112 DEGREES
QRS AXIS: 116 DEGREES
QRS AXIS: 92 DEGREES
QRSD INTERVAL: 79 MS
QRSD INTERVAL: 79 MS
QRSD INTERVAL: 88 MS
QRSD INTERVAL: 92 MS
QRSD INTERVAL: 96 MS
QT INTERVAL: 183 MS
QT INTERVAL: 258 MS
QT INTERVAL: 283 MS
QT INTERVAL: 296 MS
QT INTERVAL: 317 MS
QTC INTERVAL: 353 MS
QTC INTERVAL: 425 MS
QTC INTERVAL: 430 MS
QTC INTERVAL: 449 MS
QTC INTERVAL: 462 MS
SAO2 % BLD FROM PO2: 61 % (ref 95–98)
SODIUM BLD-SCNC: 136 MMOL/L (ref 136–145)
SODIUM SERPL-SCNC: 141 MMOL/L (ref 136–145)
SODIUM SERPL-SCNC: 142 MMOL/L (ref 136–145)
SPECIMEN SOURCE: ABNORMAL
SPECIMEN SOURCE: ABNORMAL
T WAVE AXIS: 104 DEGREES
T WAVE AXIS: 132 DEGREES
T WAVE AXIS: 142 DEGREES
T WAVE AXIS: 263 DEGREES
T WAVE AXIS: 96 DEGREES
TROPONIN I SERPL-MCNC: 0.29 NG/ML
TROPONIN I SERPL-MCNC: 0.3 NG/ML
VENT AC: 14
VENT- AC: AC
VENTRICULAR RATE: 108 BPM
VENTRICULAR RATE: 138 BPM
VENTRICULAR RATE: 160 BPM
VENTRICULAR RATE: 167 BPM
VENTRICULAR RATE: 223 BPM
VT SETTING VENT: 450 ML

## 2018-05-19 PROCEDURE — 84295 ASSAY OF SERUM SODIUM: CPT

## 2018-05-19 PROCEDURE — 93005 ELECTROCARDIOGRAM TRACING: CPT

## 2018-05-19 PROCEDURE — 82948 REAGENT STRIP/BLOOD GLUCOSE: CPT

## 2018-05-19 PROCEDURE — 84132 ASSAY OF SERUM POTASSIUM: CPT

## 2018-05-19 PROCEDURE — 71045 X-RAY EXAM CHEST 1 VIEW: CPT

## 2018-05-19 PROCEDURE — 85014 HEMATOCRIT: CPT

## 2018-05-19 PROCEDURE — 94644 CONT INHLJ TX 1ST HOUR: CPT

## 2018-05-19 PROCEDURE — 99233 SBSQ HOSP IP/OBS HIGH 50: CPT | Performed by: INTERNAL MEDICINE

## 2018-05-19 PROCEDURE — 80048 BASIC METABOLIC PNL TOTAL CA: CPT | Performed by: EMERGENCY MEDICINE

## 2018-05-19 PROCEDURE — 84484 ASSAY OF TROPONIN QUANT: CPT | Performed by: PHYSICIAN ASSISTANT

## 2018-05-19 PROCEDURE — 80162 ASSAY OF DIGOXIN TOTAL: CPT | Performed by: PHYSICIAN ASSISTANT

## 2018-05-19 PROCEDURE — 84132 ASSAY OF SERUM POTASSIUM: CPT | Performed by: PHYSICIAN ASSISTANT

## 2018-05-19 PROCEDURE — 82330 ASSAY OF CALCIUM: CPT

## 2018-05-19 PROCEDURE — 83735 ASSAY OF MAGNESIUM: CPT | Performed by: PHYSICIAN ASSISTANT

## 2018-05-19 PROCEDURE — 0BH17EZ INSERTION OF ENDOTRACHEAL AIRWAY INTO TRACHEA, VIA NATURAL OR ARTIFICIAL OPENING: ICD-10-PCS | Performed by: INTERNAL MEDICINE

## 2018-05-19 PROCEDURE — 94760 N-INVAS EAR/PLS OXIMETRY 1: CPT

## 2018-05-19 PROCEDURE — 82805 BLOOD GASES W/O2 SATURATION: CPT | Performed by: PHYSICIAN ASSISTANT

## 2018-05-19 PROCEDURE — 94660 CPAP INITIATION&MGMT: CPT

## 2018-05-19 PROCEDURE — 80053 COMPREHEN METABOLIC PANEL: CPT | Performed by: PHYSICIAN ASSISTANT

## 2018-05-19 PROCEDURE — 31500 INSERT EMERGENCY AIRWAY: CPT | Performed by: INTERNAL MEDICINE

## 2018-05-19 PROCEDURE — 93306 TTE W/DOPPLER COMPLETE: CPT

## 2018-05-19 PROCEDURE — 94002 VENT MGMT INPAT INIT DAY: CPT

## 2018-05-19 PROCEDURE — 36600 WITHDRAWAL OF ARTERIAL BLOOD: CPT

## 2018-05-19 PROCEDURE — 93010 ELECTROCARDIOGRAM REPORT: CPT | Performed by: INTERNAL MEDICINE

## 2018-05-19 PROCEDURE — 94640 AIRWAY INHALATION TREATMENT: CPT

## 2018-05-19 PROCEDURE — 83735 ASSAY OF MAGNESIUM: CPT | Performed by: EMERGENCY MEDICINE

## 2018-05-19 PROCEDURE — 82947 ASSAY GLUCOSE BLOOD QUANT: CPT

## 2018-05-19 PROCEDURE — 84100 ASSAY OF PHOSPHORUS: CPT | Performed by: PHYSICIAN ASSISTANT

## 2018-05-19 PROCEDURE — 5A1945Z RESPIRATORY VENTILATION, 24-96 CONSECUTIVE HOURS: ICD-10-PCS | Performed by: INTERNAL MEDICINE

## 2018-05-19 PROCEDURE — 84100 ASSAY OF PHOSPHORUS: CPT | Performed by: EMERGENCY MEDICINE

## 2018-05-19 PROCEDURE — 82803 BLOOD GASES ANY COMBINATION: CPT

## 2018-05-19 PROCEDURE — 93306 TTE W/DOPPLER COMPLETE: CPT | Performed by: INTERNAL MEDICINE

## 2018-05-19 RX ORDER — ASPIRIN 325 MG
325 TABLET, DELAYED RELEASE (ENTERIC COATED) ORAL DAILY
Status: DISCONTINUED | OUTPATIENT
Start: 2018-05-19 | End: 2018-05-20

## 2018-05-19 RX ORDER — PROPOFOL 10 MG/ML
5-50 INJECTION, EMULSION INTRAVENOUS
Status: DISCONTINUED | OUTPATIENT
Start: 2018-05-19 | End: 2018-05-20

## 2018-05-19 RX ORDER — MORPHINE SULFATE 2 MG/ML
2 INJECTION, SOLUTION INTRAMUSCULAR; INTRAVENOUS ONCE
Status: COMPLETED | OUTPATIENT
Start: 2018-05-19 | End: 2018-05-19

## 2018-05-19 RX ORDER — PROPOFOL 10 MG/ML
INJECTION, EMULSION INTRAVENOUS
Status: COMPLETED
Start: 2018-05-19 | End: 2018-05-19

## 2018-05-19 RX ORDER — POTASSIUM CHLORIDE 14.9 MG/ML
20 INJECTION INTRAVENOUS
Status: DISPENSED | OUTPATIENT
Start: 2018-05-19 | End: 2018-05-19

## 2018-05-19 RX ORDER — ALBUTEROL SULFATE 2.5 MG/3ML
10 SOLUTION RESPIRATORY (INHALATION) ONCE
Status: COMPLETED | OUTPATIENT
Start: 2018-05-19 | End: 2018-05-19

## 2018-05-19 RX ORDER — IPRATROPIUM BROMIDE AND ALBUTEROL SULFATE 2.5; .5 MG/3ML; MG/3ML
SOLUTION RESPIRATORY (INHALATION)
Status: DISPENSED
Start: 2018-05-19 | End: 2018-05-20

## 2018-05-19 RX ORDER — SUCCINYLCHOLINE CHLORIDE 20 MG/ML
100 INJECTION INTRAMUSCULAR; INTRAVENOUS ONCE
Status: COMPLETED | OUTPATIENT
Start: 2018-05-19 | End: 2018-05-19

## 2018-05-19 RX ORDER — AMIODARONE HYDROCHLORIDE 50 MG/ML
INJECTION, SOLUTION INTRAVENOUS
Status: DISPENSED
Start: 2018-05-19 | End: 2018-05-19

## 2018-05-19 RX ORDER — HALOPERIDOL 5 MG/ML
INJECTION INTRAMUSCULAR
Status: COMPLETED
Start: 2018-05-19 | End: 2018-05-19

## 2018-05-19 RX ORDER — METOPROLOL TARTRATE 5 MG/5ML
5 INJECTION INTRAVENOUS EVERY 6 HOURS PRN
Status: DISCONTINUED | OUTPATIENT
Start: 2018-05-19 | End: 2018-05-29

## 2018-05-19 RX ORDER — SODIUM CHLORIDE, SODIUM GLUCONATE, SODIUM ACETATE, POTASSIUM CHLORIDE, MAGNESIUM CHLORIDE, SODIUM PHOSPHATE, DIBASIC, AND POTASSIUM PHOSPHATE .53; .5; .37; .037; .03; .012; .00082 G/100ML; G/100ML; G/100ML; G/100ML; G/100ML; G/100ML; G/100ML
1000 INJECTION, SOLUTION INTRAVENOUS ONCE
Status: COMPLETED | OUTPATIENT
Start: 2018-05-19 | End: 2018-05-19

## 2018-05-19 RX ORDER — MIDAZOLAM HYDROCHLORIDE 1 MG/ML
INJECTION INTRAMUSCULAR; INTRAVENOUS
Status: COMPLETED
Start: 2018-05-19 | End: 2018-05-19

## 2018-05-19 RX ORDER — POTASSIUM CHLORIDE 20 MEQ/1
40 TABLET, EXTENDED RELEASE ORAL ONCE
Status: COMPLETED | OUTPATIENT
Start: 2018-05-19 | End: 2018-05-19

## 2018-05-19 RX ORDER — SODIUM CHLORIDE FOR INHALATION 0.9 %
VIAL, NEBULIZER (ML) INHALATION
Status: DISPENSED
Start: 2018-05-19 | End: 2018-05-20

## 2018-05-19 RX ORDER — ETOMIDATE 2 MG/ML
30 INJECTION INTRAVENOUS ONCE
Status: COMPLETED | OUTPATIENT
Start: 2018-05-19 | End: 2018-05-19

## 2018-05-19 RX ORDER — MIDAZOLAM HYDROCHLORIDE 1 MG/ML
4 INJECTION INTRAMUSCULAR; INTRAVENOUS ONCE
Status: COMPLETED | OUTPATIENT
Start: 2018-05-19 | End: 2018-05-19

## 2018-05-19 RX ORDER — LORAZEPAM 2 MG/ML
0.5 INJECTION INTRAMUSCULAR ONCE
Status: COMPLETED | OUTPATIENT
Start: 2018-05-19 | End: 2018-05-19

## 2018-05-19 RX ORDER — LORAZEPAM 2 MG/ML
2 INJECTION INTRAMUSCULAR ONCE
Status: COMPLETED | OUTPATIENT
Start: 2018-05-19 | End: 2018-05-19

## 2018-05-19 RX ORDER — METHYLPREDNISOLONE SODIUM SUCCINATE 125 MG/2ML
125 INJECTION, POWDER, LYOPHILIZED, FOR SOLUTION INTRAMUSCULAR; INTRAVENOUS ONCE
Status: COMPLETED | OUTPATIENT
Start: 2018-05-19 | End: 2018-05-19

## 2018-05-19 RX ORDER — METOPROLOL TARTRATE 5 MG/5ML
5 INJECTION INTRAVENOUS ONCE
Status: COMPLETED | OUTPATIENT
Start: 2018-05-19 | End: 2018-05-19

## 2018-05-19 RX ORDER — POTASSIUM CHLORIDE 20 MEQ/1
20 TABLET, EXTENDED RELEASE ORAL ONCE
Status: DISCONTINUED | OUTPATIENT
Start: 2018-05-19 | End: 2018-05-19

## 2018-05-19 RX ORDER — CHLORHEXIDINE GLUCONATE 0.12 MG/ML
15 RINSE ORAL EVERY 12 HOURS SCHEDULED
Status: DISCONTINUED | OUTPATIENT
Start: 2018-05-19 | End: 2018-05-21

## 2018-05-19 RX ADMIN — INSULIN LISPRO 2 UNITS: 100 INJECTION, SOLUTION INTRAVENOUS; SUBCUTANEOUS at 11:30

## 2018-05-19 RX ADMIN — Medication 150 MG: at 03:15

## 2018-05-19 RX ADMIN — POTASSIUM CHLORIDE 40 MEQ: 1500 TABLET, EXTENDED RELEASE ORAL at 09:46

## 2018-05-19 RX ADMIN — PROPOFOL 15 MCG/KG/MIN: 10 INJECTION, EMULSION INTRAVENOUS at 15:10

## 2018-05-19 RX ADMIN — METOPROLOL TARTRATE 5 MG: 5 INJECTION, SOLUTION INTRAVENOUS at 09:11

## 2018-05-19 RX ADMIN — OXAZEPAM 15 MG: 15 CAPSULE, GELATIN COATED ORAL at 06:22

## 2018-05-19 RX ADMIN — IPRATROPIUM BROMIDE 0.5 MG: 0.5 SOLUTION RESPIRATORY (INHALATION) at 05:56

## 2018-05-19 RX ADMIN — NICOTINE 14 MG: 14 PATCH, EXTENDED RELEASE TRANSDERMAL at 09:46

## 2018-05-19 RX ADMIN — BUDESONIDE 0.5 MG: 0.5 INHALANT RESPIRATORY (INHALATION) at 19:06

## 2018-05-19 RX ADMIN — METHYLPREDNISOLONE SODIUM SUCCINATE 40 MG: 40 INJECTION, POWDER, FOR SOLUTION INTRAMUSCULAR; INTRAVENOUS at 13:12

## 2018-05-19 RX ADMIN — AMIODARONE HYDROCHLORIDE 1 MG/MIN: 50 INJECTION, SOLUTION INTRAVENOUS at 10:16

## 2018-05-19 RX ADMIN — HALOPERIDOL LACTATE 5 MG: 5 INJECTION, SOLUTION INTRAMUSCULAR at 14:20

## 2018-05-19 RX ADMIN — CHLORHEXIDINE GLUCONATE 15 ML: 1.2 RINSE ORAL at 20:32

## 2018-05-19 RX ADMIN — IPRATROPIUM BROMIDE 0.5 MG: 0.5 SOLUTION RESPIRATORY (INHALATION) at 19:06

## 2018-05-19 RX ADMIN — INSULIN LISPRO 5 UNITS: 100 INJECTION, SOLUTION INTRAVENOUS; SUBCUTANEOUS at 17:45

## 2018-05-19 RX ADMIN — HEPARIN SODIUM 5000 UNITS: 5000 INJECTION, SOLUTION INTRAVENOUS; SUBCUTANEOUS at 22:50

## 2018-05-19 RX ADMIN — HEPARIN SODIUM 5000 UNITS: 5000 INJECTION, SOLUTION INTRAVENOUS; SUBCUTANEOUS at 06:21

## 2018-05-19 RX ADMIN — METOPROLOL TARTRATE 5 MG: 5 INJECTION, SOLUTION INTRAVENOUS at 11:52

## 2018-05-19 RX ADMIN — POTASSIUM PHOSPHATE, MONOBASIC AND POTASSIUM PHOSPHATE, DIBASIC 12 MMOL: 224; 236 INJECTION, SOLUTION INTRAVENOUS at 09:14

## 2018-05-19 RX ADMIN — LACTULOSE 20 G: 20 SOLUTION ORAL at 09:47

## 2018-05-19 RX ADMIN — IPRATROPIUM BROMIDE 0.5 MG: 0.5 SOLUTION RESPIRATORY (INHALATION) at 00:39

## 2018-05-19 RX ADMIN — LEVALBUTEROL HYDROCHLORIDE 1.25 MG: 1.25 SOLUTION, CONCENTRATE RESPIRATORY (INHALATION) at 00:40

## 2018-05-19 RX ADMIN — CALCIUM CHLORIDE 1 G: 100 INJECTION INTRAVENOUS; INTRAVENTRICULAR at 09:14

## 2018-05-19 RX ADMIN — DILTIAZEM HYDROCHLORIDE 15 MG/HR: 5 INJECTION INTRAVENOUS at 04:31

## 2018-05-19 RX ADMIN — ETOMIDATE 30 MG: 2 INJECTION INTRAVENOUS at 15:09

## 2018-05-19 RX ADMIN — METHYLPREDNISOLONE SODIUM SUCCINATE 125 MG: 40 INJECTION, POWDER, FOR SOLUTION INTRAMUSCULAR; INTRAVENOUS at 12:55

## 2018-05-19 RX ADMIN — LORAZEPAM 0.5 MG: 2 INJECTION INTRAMUSCULAR; INTRAVENOUS at 12:55

## 2018-05-19 RX ADMIN — ALBUTEROL SULFATE 10 MG: 2.5 SOLUTION RESPIRATORY (INHALATION) at 12:11

## 2018-05-19 RX ADMIN — ALBUTEROL SULFATE 10 MG: 2.5 SOLUTION RESPIRATORY (INHALATION) at 12:16

## 2018-05-19 RX ADMIN — LORAZEPAM 2 MG: 2 INJECTION INTRAMUSCULAR; INTRAVENOUS at 03:17

## 2018-05-19 RX ADMIN — LORAZEPAM 2 MG: 2 INJECTION INTRAMUSCULAR; INTRAVENOUS at 13:06

## 2018-05-19 RX ADMIN — SODIUM CHLORIDE, SODIUM GLUCONATE, SODIUM ACETATE, POTASSIUM CHLORIDE, MAGNESIUM CHLORIDE, SODIUM PHOSPHATE, DIBASIC, AND POTASSIUM PHOSPHATE 1000 ML: .53; .5; .37; .037; .03; .012; .00082 INJECTION, SOLUTION INTRAVENOUS at 16:02

## 2018-05-19 RX ADMIN — LEVALBUTEROL HYDROCHLORIDE 1.25 MG: 1.25 SOLUTION, CONCENTRATE RESPIRATORY (INHALATION) at 05:57

## 2018-05-19 RX ADMIN — MIDAZOLAM 4 MG: 1 INJECTION INTRAMUSCULAR; INTRAVENOUS at 15:08

## 2018-05-19 RX ADMIN — BUDESONIDE 0.5 MG: 0.5 INHALANT RESPIRATORY (INHALATION) at 05:57

## 2018-05-19 RX ADMIN — OXAZEPAM 15 MG: 15 CAPSULE, GELATIN COATED ORAL at 00:11

## 2018-05-19 RX ADMIN — DIGOXIN 250 MCG: 0.25 INJECTION INTRAMUSCULAR; INTRAVENOUS at 13:12

## 2018-05-19 RX ADMIN — METHYLPREDNISOLONE SODIUM SUCCINATE 40 MG: 40 INJECTION, POWDER, FOR SOLUTION INTRAMUSCULAR; INTRAVENOUS at 06:22

## 2018-05-19 RX ADMIN — HEPARIN SODIUM 5000 UNITS: 5000 INJECTION, SOLUTION INTRAVENOUS; SUBCUTANEOUS at 13:12

## 2018-05-19 RX ADMIN — PROPOFOL 50 MCG/KG/MIN: 10 INJECTION, EMULSION INTRAVENOUS at 22:30

## 2018-05-19 RX ADMIN — DIGOXIN 250 MCG: 0.25 INJECTION INTRAMUSCULAR; INTRAVENOUS at 06:22

## 2018-05-19 RX ADMIN — IPRATROPIUM BROMIDE 1 MG: 0.5 SOLUTION RESPIRATORY (INHALATION) at 12:17

## 2018-05-19 RX ADMIN — INSULIN LISPRO 2 UNITS: 100 INJECTION, SOLUTION INTRAVENOUS; SUBCUTANEOUS at 09:47

## 2018-05-19 RX ADMIN — MORPHINE SULFATE 2 MG: 2 INJECTION, SOLUTION INTRAMUSCULAR; INTRAVENOUS at 13:45

## 2018-05-19 RX ADMIN — Medication 100 MG: at 14:28

## 2018-05-19 RX ADMIN — LEVALBUTEROL HYDROCHLORIDE 1.25 MG: 1.25 SOLUTION, CONCENTRATE RESPIRATORY (INHALATION) at 19:06

## 2018-05-19 RX ADMIN — DIGOXIN 250 MCG: 0.25 INJECTION INTRAMUSCULAR; INTRAVENOUS at 00:11

## 2018-05-19 RX ADMIN — DEXMEDETOMIDINE HYDROCHLORIDE 0.5 MCG/KG/HR: 100 INJECTION, SOLUTION INTRAVENOUS at 13:04

## 2018-05-19 RX ADMIN — DIBASIC SODIUM PHOSPHATE, MONOBASIC POTASSIUM PHOSPHATE AND MONOBASIC SODIUM PHOSPHATE 2 TABLET: 852; 155; 130 TABLET ORAL at 09:46

## 2018-05-19 RX ADMIN — METHYLPREDNISOLONE SODIUM SUCCINATE 40 MG: 40 INJECTION, POWDER, FOR SOLUTION INTRAMUSCULAR; INTRAVENOUS at 22:50

## 2018-05-19 RX ADMIN — DILTIAZEM HYDROCHLORIDE 14 MG/HR: 5 INJECTION INTRAVENOUS at 13:50

## 2018-05-19 RX ADMIN — OXAZEPAM 15 MG: 15 CAPSULE, GELATIN COATED ORAL at 17:26

## 2018-05-19 RX ADMIN — MIDAZOLAM HYDROCHLORIDE 4 MG: 1 INJECTION INTRAMUSCULAR; INTRAVENOUS at 15:08

## 2018-05-19 RX ADMIN — POTASSIUM CHLORIDE 20 MEQ: 200 INJECTION, SOLUTION INTRAVENOUS at 10:08

## 2018-05-19 NOTE — CONSULTS
Consultation - Cardiology   Radha Kirkland 56 y o  male MRN: [id number]  Unit/Bed#: MICU 06 Encounter: [id number]      Assessment:  Principal Problem:    Shock [facility])  Active Problems:    Colitis    Coronary artery disease involving native coronary artery of native heart    COPD with exacerbation (HCC)    Lactic acidosis    High anion gap metabolic acidosis    Acute kidney failure (HCC)    Transaminitis    Elevated troponin    Hypokalemia    Hyponatremia    Alcohol abuse    Tobacco abuse    Alcohol dependence with withdrawal (HCC)    Narrow complex tachycardia up to 220-250s, Likely aflutter with one-to-one conduction ( as flutter waves seen at the time of lower HRs to 150-60s) vs Afib , intermittent wide complex during fast heart rates possibly aberrant conduction  - status post 300 mg amiodarone bolus, adenosine 6 mg plus 12 mg, Cardizem 10 mg IV, metoprolol 5 mg IV, had transient drop in blood pressure to 90s, status post 100 joule synchronized cardioversion x1 with no significant improvement   -heart rates now improved compared to before however have been persistently in the 150-160s  Patient not agreeable to further cardioversion      - would continue with amiodarone at 1 mg per minute  Would give another 150 amiodarone bolus if heart rates go up     -continue Cardizem drip as blood pressures tolerate   - digoxin 250 mcg 1 dose given, would give digoxin 250 mcg Q 6 X 3 more doses to give a 1000 mcg 24 hour load  - patient has no known history of SVT or AFib or a flutter, these episodes of tachycardia could also have been additional triggered by withdrawal of his sedatives this afternoon   -recommend treatment of alcohol withdrawal with benzodiazepines   -transthoracic echocardiogram in a m     -recent TSH was within normal limits  -potassium 3 2 at this time, would replete to keep potassium more than 4 5, Mg > 2 5  - he was on metoprolol at home, would reinitiate when blood pressures improve  -would avoid albuterol and switch to leave albuterol  -[id number] score is 1, continue aspirin  EP evaluation for invasive interventions if no control on medical management  History of CAD  Inferior MI status post RCA stent in 2012  Cardiac catheterization in 2016 for diffuse ST elevations revealed patent RCA stent  Was treated for pericarditis with colchicine and aspirin at this time  Restart aspirin, atorvastatin    COPD    Alcohol abuse  - treated for alcohol withdrawal this admission    Hypertension    Hyperlipidemia  On atorvastatin 40 HS    Crohn's disease/ ulcerative colitis        History of Present Illness   Physician Requesting Consult: Juliocesar Taylor DO  Reason for Consult / Principal Problem:  Narrow complex tachycardia  HPI: Nicol Coker is a 56y o  year old male with past medical history of COPD, alcohol abuse, CAD with history of inferior MI in 2012 status post RCA stent, hyperlipidemia, hyper tension, ulcerative colitis, history of hemicolectomy, was admitted on 05/15/2018 for generalized weakness, multiple episodes of vomiting in the last month, decreased appetite  On presentation he was hypotensive and requiring fluid resuscitation and pressors initially  He was also found to have acute kidney injury with severe metabolic derangements included elevated lactic acid   and mild troponin elevation  He was treated for acute encephalopathy secondary to alcohol withdrawal, COPD exacerbation, septic shock secondary to possible Crohn's acute flare  He was clinically improving and was about to be transferred out of ICU today when patient was noted to be hypertensive to 145/107 with heart rate of 200-220 on the monitor  Patient did not have any symptoms  Initially vagal maneuvers were tried without success, he was then given adenosine 6 mg plus 12 mg  He was then given a total of amiodarone 300 mg   At this time he was hypotensive to 90s with elevated heart rates    He given fentanyl and 2 mg Versed and shocked with 100 joules synchronized cardioversion, with only transient improvement in heart rates  Amiodarone infusion was started, and as his heart rates went back up to 200s, he was given 5 mg IV Lopressor, 10 mg IV Cardizem and Cardizem infusion was started  Patient was also given 250 mcg of digoxin  The heart rates remained in 170 to 180s after all these therapies  Patient remained asymptomatic throughout this  He denies any chest pain, palpitations, lightheadedness               Consults    Review of Systems:  Review of Systems    As per [facility]    Historical Information   Past Medical History:   Diagnosis Date    Asthma     Cardiac disease     Colitis     Colon polyps     [facility]     Coronary artery disease     Diverticulitis     Hyperlipidemia     Hypertension     [facility] infarction [facility]     Ulcerative colitis ([facility] )      Past Surgical History:   Procedure Laterality Date    ANGIOPLASTY      COLON SURGERY      COLONOSCOPY N/A 10/24/2016    Procedure: COLONOSCOPY;  Surgeon: Violet Tran MD;  Location: [facility]; Service:     CORONARY ANGIOPLASTY WITH STENT PLACEMENT      ESOPHAGOGASTRODUODENOSCOPY N/A 10/24/2016    Procedure: ESOPHAGOGASTRODUODENOSCOPY (EGD); Surgeon: Violet Tran MD;  Location: [facility]; Service:     [facility] N/A 2/6/2018    Procedure: COLONOSCOPY;  Surgeon: Violet Tran MD;  Location: [facility]; Service: Colorectal    TONSILLECTOMY       History   Alcohol Use    Yes     Comment: kenji occassionally     History   Drug Use No     History   Smoking Status    Current Every Day Smoker    Types: Cigars   Smokeless Tobacco    Never Used     Family History: non-contributory    Meds/Allergies   PTA meds:   Prior to Admission Medications   Prescriptions Last Dose Informant Patient Reported? Taking?    Mometasone Furo-Formoterol Fum (DULERA) 200-5 MCG/ACT AERO Unknown at Unknown time  No No   Sig: Inhale 2 puffs every 12 (twelve) hours   Tiotropium Bromide Monohydrate (SPIRIVA RESPIMAT) 2 5 MCG/ACT AERS Unknown at Unknown time  Yes No   Sig: Inhale 2 puffs daily   Tiotropium Bromide Monohydrate (SPIRIVA RESPIMAT) 2 5 MCG/ACT AERS Unknown at Unknown time  No No   Sig: Inhale 1 Act (2 5 mcg total) daily for 90 days   VENTOLIN  (90 Base) MCG/ACT inhaler   No No   Sig: Inhale 2 puffs every 4 (four) hours as needed for wheezing or shortness of breath   albuterol (2 5 mg/3 mL) 0 083 % nebulizer solution Unknown at Unknown time  No No   Sig: USE 1 UNIT DOSE EVERY 4-6 HOURS AS NEEDED FOR WHEEZING   atorvastatin (LIPITOR) 40 mg tablet   No No   Sig: Take 1 tablet by mouth daily with dinner for 30 days   gabapentin (NEURONTIN) 300 mg capsule Unknown at Unknown time  No No   Sig: Take 1 capsule (300 mg total) by mouth 3 (three) times a day   lisinopril (ZESTRIL) 2 5 mg tablet   No No   Sig: Take 1 tablet (2 5 mg total) by mouth daily for 90 days   metoprolol tartrate (LOPRESSOR) 25 mg tablet   No No   Sig: Take 0 5 tablets (12 5 mg total) by mouth every 12 (twelve) hours for 30 days      Facility-Administered Medications: None     Allergies   Allergen Reactions    Other      Brown cloth band aids          Objective   Vitals: Blood pressure 105/73, pulse (!) 170, temperature 97 7 °F (36 5 °C), temperature source Oral, resp  rate 18, height 5' 6" (1 676 m), weight 85 5 kg (188 lb 7 9 oz), SpO2 95 %  , Body mass index is 30 42 kg/m² , Orthostatic Blood Pressures      Most Recent Value   Blood Pressure  105/73 filed at 05/18/2018 2122   Patient Position - Orthostatic VS  Sitting filed at 05/18/2018 1600            Intake/Output Summary (Last 24 hours) at 05/18/18 2234  Last data filed at 05/18/18 1600   Gross per 24 hour   Intake          2410 31 ml   Output             1305 ml   Net          1105 31 ml       Invasive Devices     Peripheral Intravenous Line            Peripheral IV 05/15/18 Left Antecubital 3 days    Peripheral IV 05/15/18 Right Antecubital 3 days                    Physical Exam  Lab Results:     Lab Results   Component Value Date    CKTOTAL 295 05/15/2018    CKMBINDEX 3 1 (H) 05/15/2018    TROPONINI 0 32 (H) 05/18/2018    TROPONINI 1 15 (H) 05/16/2018    TROPONINI 1 24 (H) 05/16/2018       Lab Results   Component Value Date    GLUCOSE 193 (H) 05/18/2018    CALCIUM 6 8 (L) 05/18/2018     05/18/2018    K 3 2 (L) 05/18/2018    CO2 30 05/18/2018     05/18/2018    BUN 14 05/18/2018    CREATININE 0 80 05/18/2018       Lab Results   Component Value Date    WBC 15 07 (H) 05/18/2018    HGB 12 5 05/18/2018    HCT 37 2 05/18/2018    MCV 98 05/18/2018    PLT 76 (L) 05/18/2018       Lab Results   Component Value Date    CHOL 131 12/08/2016     Lab Results   Component Value Date    HDL 60 12/08/2016     Lab Results   Component Value Date    LDLCALC 47 12/08/2016     Lab Results   Component Value Date    TRIG 120 12/08/2016       Lab Results   Component Value Date    ALT 46 05/18/2018    AST 63 (H) 05/18/2018         Results from last 7 days  Lab Units 05/15/18  1305   INR  1 05         Imaging: I have personally reviewed pertinent reports

## 2018-05-19 NOTE — PROGRESS NOTES
Patient has been hemodynamically stable since cardioversion earlier tonight and asymptomatic but continues to be in atrial fibrillation with RVR  Systolic BP has remained in the 110-120s and heart rate has fluctuated from the 150s to 180s  He is on nasal cannula with a SpO2 of 95%  Patient continues to deny any chest pain, lightheadedness or shortness of breath  Cardiology fellow Mary Seen recommended cardioversion to bring him back into sinus rhythm however patient declines "getting shocked "  I discussed with the patient myself the benefits and risks of cardioversion and what may occur if he is not treated through cardioversion  He again declined cardioversion  He agrees to defibrillation only if his "heart stops "  We will continue to treat  atrial fibrillation pharmacologically

## 2018-05-19 NOTE — PROGRESS NOTES
Progress Note:    18:50  Called to bedside by nursing staff at approx [phone] for tachycardia when pt was noted to be talking, hypertensive and w/ a HR of 200-220 on the monitor  Denied CP, SOB, but was very concerned about the attention he was receiving  Vagal maneuvers were attempted w/o success  On monitor the rhythm was intermittently variable b/w narrow and wide complex w/ regular appearance and stable and adenosine 6 mg was give w/o any change in HR  An additional 12 mg of adenosine was given again w/o improvement  Amiodarone 150 mg was ordered and being prepared for infusion, however, pt was subsequently becoming marginally hypotense w/ SBP 90'S and appearing somewhat less responsive  Defib pads were placed / monitor w/ appearance of narrow regular complex tachycardia  Sedation of 100 fentanyl  / 2mg versed given while synchronizing and charging defibrillator  Synch cardioversion was attempted w/o conversion success, however, pt responded w/ pain response, HR slowed to 140s and now becoming narrow irregular, but normotense  Pt was begun on amiodarone infusion @ 1mg/min  Approx 15 min later pt again increased his HR into the 200 BPM range w/ normal mentation and  Normotension  Pt was given another 150 mg amiodarone loading bolus  No further indication to cardiovert given the pt remaining normotensive and adequate perfusion markers  Pt was given 5 mg IV lopressor in addition to amiodarone which momentarily brought the HR into the 140s but then eventually HR increased back into the 180s  With the patient remaining normotensive in the 115-120 range SBP the pt was then additionally given a 10 mg dose of cardizem followed by an infusion w/ starting rate of 5 mg / hr   Minimal HR response to cardizem bolus  In the interim while waiting for cardizem pt was given 250 mcg digoxin w/ scheduled dosing for 2 more loading doses    Cardiology had been notified earlier in the process after the 2nd run of HR > 200s   Echo was ordered / troponins as well  EKGS were unrevealing from a ACS standpoint with intermittent EKG readings of A fib / flutter  Cases were discussed w/ cardiology on call  [facility] attending was notified and present for all key portions of ACLS interventions    Case was also discussed and transferred to night team     CC time 69 min

## 2018-05-19 NOTE — PROCEDURES
Intubation  Date/Time: 5/19/2018 2:28 PM  Performed by: Ariane Terrazas by: Juno Llanes     Patient location:  Bedside  Other Assisting Provider: No    Consent:     Consent obtained:  Emergent situation  Universal protocol:     Patient identity confirmed:  Verbally with patient  Pre-procedure details:     Patient status:  Altered mental status    Mallampati score:  3    Pretreatment medications:  Etomidate    Paralytics:  Succinylcholine  Indications:     Indications for intubation: respiratory distress    Procedure details:     Preoxygenation:  Bag valve mask    CPR in progress: no      Intubation method:  Oral    Oral intubation technique:  Glidescope    Laryngoscope blade:   Mac 3    Tube size (mm):  8 0    Tube type:  Hi-lo    Number of attempts:  1    Ventilation between attempts: no      Cricoid pressure: no      Tube visualized through cords: yes    Placement assessment:     ETT to lip:  24    ETT to teeth:  23    Tube secured with:  ETT rowan    Breath sounds:  Equal    Placement verification: chest rise, ETCO2 detector and fiberoptic scope

## 2018-05-19 NOTE — PROGRESS NOTES
Patient agitated, tachycardic, sats dropping below 87%  CCM notified  Bipap started  Patient continually pulling mask off, despite redirection  Wrist restraints applied  Patient much more agitated and restless  Multiple sedative medications given  Patient intubated by Dr Jp Laughlin  RNs and respiratory at bedside  OGT and hernandez placed

## 2018-05-19 NOTE — RESPIRATORY THERAPY NOTE
RT Ventilator Management Note  Colin Moy 56 y o  male MRN: [id number]  Unit/Bed#: MICU 06 Encounter: [id number]      Daily Screen     No data found              Physical Exam:          Resp Comments: (P) pt intubated by Dr Josie Starr with a size 8 endotube/24 @ the lip, bilat breath sounds, + color change on ETC02 detector,awaiting cxr for tube placement

## 2018-05-19 NOTE — PROGRESS NOTES
Critical Care Interval Progress Note     Yesenia Brownlee 56 y o  male MRN: [id number]    Unit/Bed#: MICU 06 Encounter: [id number]    Impression:    1  Acute Hypoxic Respiratory Failure  2  Bronchospasm  3  COPD with acute exacerbation  4  Agitation    Plan:  Neuro: continue to monitor mental status  Received Versed 2mg followed by 30 mg Etomidate  and 100mg Succinylcholine  Will place on propofol gtt and d/c precedex for now  CV: continue telemetry and HD monitoring, HD stable at this time, maintain goal MAP >65mm/hg, Continue amiodarone 0 5mg as well as cardizem gtt  Continue ASA  Pulm: emergently intubated at bedside, continue SpO2 monitoring maintaining >88%  Continue solumedrol 40mg q8h, Xopenex/Atrovent q6h, and pulmicort q12  GI: place NG tube, will begin tube feeds, will consider glucerna, continue lactulose, as well as place on omeprazole for PPx  : place condom cath, monitor U/O with strict I/Os  F/E/N: replete lytes prn  ID: monitor temps as well as WBC count, continue to monitor off antibiotics  Heme: monitor Hgb/Plt, continue SQ heparin DVT PPx as well as B/L venodynes  Endo: continue accu-cheks goal 160-180mg/dl  Misc: continue to closely monitor for further decompensation  Critical Care attending at bedside  Dr Darci Reid contacted patients daughter advising of decompensation      Counseling / Coordination of Care: Total time spent today 65 minutes  Greater than 50% of total time was spent with the patient and / or family counseling and / or coordination of care   A description of the counseling / coordination of care: continual bedside monitoring and direct management     **total Critical care time of 50 mins providing direct bedside care during his critical / acute decompensation**     ___________________________________________________________________    Chief Complaint: "I can't breath "    Recent Events / Nursing Concern:   Over the course of the past 90 mins, have been called to patients bedside multiple times  Initially for acute worsening respiratory distress  Patient at that time found bronchospastic  He was placed back on BiPAP, Provided 10mg Albuterol 1mg Atrovent via inline Neb  Additionally provided additional 125mg Solumedrol IV despite already being on 40mg q8  Patient closely monitored over the next 60 mins  Patient began to improve, breath sounds frequently reassessed and noted improvement with now ausculatory breath sounds with wheezing diffusely  Patient continued to remain grossly agitated, pulling BiPAP mask off despite encouragement and bedside management / motivation to keep in place  Patient placed in soft bilateral wrist restraints, he was provide ativan and later provided 2mg Morphine IV  Patient continued to be aggressive, thrashing about the bed  Despite being on BiPAP patient with increased work of breathing and hypoxia  Decision made to intubate at bedside  Vitals:   Vitals:    18 1244 18 1300 18 1320 18 1343   BP: (!) 142/105  (!) 156/102    Pulse:  (!) 148  (!) 120   Resp:  (!) 33  (!) 33   Temp:       TempSrc:       SpO2:  98%  93%   Weight:       Height:                 Temperature: Temp (24hrs), Av 9 °F (36 6 °C), Min:97 7 °F (36 5 °C), Max:97 9 °F (36 6 °C)  Current: Temperature: 97 9 °F (36 6 °C)    Unable to obtain ROS due to acute change in condition  Respiratory:  SpO2: SpO2: 93 %  O2 Flow Rate (L/min): 6 L/min    Physical Exam:  Physical Exam    General: 57 y/o extremely agitated, thrashing about the bed, appearing acutely ill and decompensating  HEENT: normocephalic, atraumatic   PERRL, Sclera anicteric, conjunctiva pink, Oropharynx moist  Neck: Supple, trachea midline  Heart: Apically tachycardic, no murmur, rub, or gallop  Lungs: markedly decreased breath sounds bilaterally, now diffuse wheezing; however still extremely tight  Abd: Soft, non-tender, active BS noted  Ext: distal pulses intact    Allergies:    Allergies   Allergen Reactions    Other      Brown cloth band aids          Medications:   Scheduled Meds:  Current Facility-Administered Medications:  amiodarone 0 5 mg/min Intravenous Continuous Mary Landrum PA-C Last Rate: 0 5 mg/min (05/19/18 1127)   amiodarone        aspirin 325 mg Oral Daily Mary Landrum PA-C    budesonide 0 5 mg Nebulization Q12H Ale Dixon PA-C    dexmedetomidine 1 mcg/kg/hr Intravenous Titrated Kody Panda MD Last Rate: 1 mcg/kg/hr (05/19/18 1401)   diltiazem 1-15 mg/hr Intravenous Titrated Ale Dixon PA-C Last Rate: 14 mg/hr (05/19/18 1350)   etomidate 30 mg Intravenous Once Kody Panda MD    haloperidol lactate        heparin (porcine) 5,000 Units Subcutaneous [facility] AMALIA Clifton    insulin lispro 1-6 Units Subcutaneous HS Mary Landrum PA-C    insulin lispro 5-25 Units Subcutaneous TID AC Mary Landrum PA-C    ipratropium 0 5 mg Nebulization Q6H Ale Dixon PA-C    ipratropium-albuterol        lactulose 20 g Oral Daily Ale Dixon PA-C    levalbuterol 1 25 mg Nebulization Q6H Ale Dixon PA-C    LORazepam 0 5 mg Intravenous Q2H PRN AMALIA Smyth    Or        LORazepam 1 mg Intravenous Q2H PRN AMALIA Smyth    Or        LORazepam 2 mg Intravenous Q2H PRN AMALIA Smyth    methylPREDNISolone sodium succinate 40 mg Intravenous [facility] AMALIA Clifton    metoprolol 5 mg Intravenous Q6H PRN Mary Landrum PA-C    metoprolol tartrate 25 mg Oral Q12H [address] Mary Landrum PA-C    midazolam        midazolam 4 mg Intravenous Once Kody Panda MD    morphine injection 2 mg Intravenous Once Mary Landrum PA-C    nicotine 14 mg Transdermal Daily AMALIA Smyth    oxazepam 15 mg Oral Q6H [address] Kody Panda MD    propofol        sodium chloride        succinylcholine 100 mg Intravenous Once Kody Panda MD      Continuous Infusions:  amiodarone 0 5 mg/min Last Rate: 0 5 mg/min (05/19/18 1127)   dexmedetomidine 1 mcg/kg/hr Last Rate: 1 mcg/kg/hr (05/19/18 1401)   diltiazem 1-15 mg/hr Last Rate: 14 mg/hr (05/19/18 1350)     PRN Meds:    LORazepam 0 5 mg Q2H PRN   Or     LORazepam 1 mg Q2H PRN   Or     LORazepam 2 mg Q2H PRN   metoprolol 5 mg Q6H PRN       Labs:     Results from last 7 days  Lab Units 05/19/18  0608 05/18/18  1909 05/18/18  1900 05/18/18  0457 05/17/18  0418  05/15/18  1304   WBC Thousand/uL  --  15 07*  --  6 91 7 88  < > 10 21*   HEMOGLOBIN g/dL  --  12 5  --  11 4* 11 3*  < > 14 0   I STAT HEMOGLOBIN g/dl 10 2*  --  11 9*  --   --   < >  --    HEMATOCRIT %  --  37 2  --  33 1* 33 4*  < > 39 5   PLATELETS Thousands/uL  --  76*  --  48* 64*  < > 61*   NEUTROS PCT %  --  87*  --  89*  --   --  83*   MONOS PCT %  --  7  --  5  --   --  9   MONO PCT MAN %  --   --   --   --  1*  < >  --    < > = values in this interval not displayed  Results from last 7 days  Lab Units 05/19/18  0608 05/19/18  0439 05/19/18  0019 05/18/18  1909 05/18/18  0457  05/16/18  0522   SODIUM mmol/L  --  142 141 140  --  135*  < > 133*   POTASSIUM mmol/L  --  3 6 3 7 3 2*  --  3 7  < > 4 4   CHLORIDE mmol/L  --  104 104 104  --  100  < > 97*   CO2 mmol/L  --  32 31 30  --  27  < > 25   BUN mg/dL  --  11 13 14  --  16  < > 18   CREATININE mg/dL  --  0 77 0 84 0 80  --  0 84  < > 1 80*   CALCIUM mg/dL  --  6 7* 7 0* 6 8*  --  6 3*  < > 6 3*   TOTAL PROTEIN g/dL  --  5 7*  --   --   --  5 2*  --  6 4   BILIRUBIN TOTAL mg/dL  --  0 70  --   --   --  0 46  --  0 64   ALK PHOS U/L  --  170*  --   --   --  152*  --  190*   ALT U/L  --  62  --   --   --  46  --  71   AST U/L  --  100*  --   --   --  63*  --  166*   GLUCOSE RANDOM mg/dL  --  182* 185* 193*  --  202*  < > 198*   GLUCOSE, ISTAT mg/dl 190*  --   --   --   < >  --   --   --    < > = values in this interval not displayed      Results from last 7 days  Lab Units 05/19/18  0439 05/19/18  0019 05/18/18  1909   MAGNESIUM mg/dL 2 3 2 3 2 5 Results from last 7 days  Lab Units 05/19/18  0439 05/19/18  0019 05/18/18  1909   PHOSPHORUS mg/dL 0 7* 0 7* 0 7*        Results from last 7 days  Lab Units 05/15/18  1305   INR  1 05   PTT seconds 27       Results from last 7 days  Lab Units 05/16/18  0722 05/16/18  0413 05/16/18  0209 05/16/18  0053 05/15/18  1935   LACTIC ACID mmol/L 1 6 2 8* 3 8* 3 7* 5 1*       0  Lab Value Date/Time   TROPONINI 0 29 (H) 05/19/2018 0438   TROPONINI 0 30 (H) 05/19/2018 0154   TROPONINI 0 32 (H) 05/18/2018 2229   TROPONINI 0 32 (H) 05/18/2018 1909   TROPONINI 1 15 (H) 05/16/2018 1416   TROPONINI 1 24 (H) 05/16/2018 0522   TROPONINI 1 16 (H) 05/16/2018 0209   TROPONINI 1 09 (H) 05/16/2018 0053   TROPONINI 0 85 (H) 05/15/2018 1935   TROPONINI 0 72 (H) 05/15/2018 1728   TROPONINI 0 47 (H) 05/15/2018 1304   TROPONINI 0 02 12/07/2016 1931   TROPONINI 0 04 (H) 12/07/2016 0907   TROPONINI <0 04 05/22/2015 0123       Results from last 7 days  Lab Units 05/16/18  1823 05/16/18  1423 05/16/18  1055 05/16/18  0835   PH ART  7 337* 7 320* 7 277* 7 173*   PCO2 ART mm Hg 48 4* 54 1* 56 1* 68 7*   PO2 ART mm Hg 92 1 89 4 80 6 82 2   HCO3 ART mmol/L 25 4 27 2 25 6 24 7   BASE EXC ART mmol/L -0 8 0 4 -1 8 -4 9   ABG SOURCE  Radial, Right Radial, Right Radial, Left Radial, Right       Diagnostic Imaging / Data: I have personally reviewed pertinent reports  and I have personally reviewed pertinent films in PACS  EKG: Telemetry monitor reviewed noting  Sinus Tachycardia  Code Status: Level 1 - Full Code    Portions of the record may have been created with voice recognition software  Occasional wrong word or "sound a like" substitutions may have occurred due to the inherent limitations of voice recognition software  Read the chart carefully and recognize, using context, where substitutions have occurred      SIGNATURE: Mary Landrum PA-C  DATE: May 19, 2018  TIME: 2:30 PM

## 2018-05-19 NOTE — PROGRESS NOTES
Progress Note - Critical Care   Kadeem Pelayo 56 y o  male MRN: [id number]  [facility] En[facility]: [id number]    Assessment:   Patient Active Problem List   Diagnosis    Colitis    Spinal stenosis of cervical region    Hypertension    Pericarditis    Coronary artery disease involving native coronary artery of native heart    Leukocytosis    Asthma    Atherosclerosis of coronary artery    COPD [facility] exacerbation [facility] [place] [address] [facility]    Dyslipidemia    Shock ([facility] [address] )    Lactic acidosis    High anion gap metabolic acidosis    Acute kidney failure (HCC)    Transaminitis    Elevated troponin    Hypokalemia    Hyponatremia    Alcohol abuse    Tobacco abuse    Alcohol dependence with withdrawal (HCC)     Plan:      Neuro:   - Acute encephalopathy secondary to alcohol withdrawal   - Significantly improved, continue Serax 15 mg Q6H, ativan PRN only given once overnight  - Seroquel and Haldol not started due to prolonged QTc        CV:   - Afib with RVR    - yesterday evening patient was noted to be hypertensive , heart rate between 200-220, asymptomatic  Physical never started that success, given adenosine, followed by amiodarone and became hypotensive to SBP in the 90s with persistently elevated heart rate  He was given 10 on Versed needs synchronized cardioversion 100 joules with only a transient from heart rate  Thus amiodarone infusion was started, rate again back to 100s and patient given 5 milligrams IV Lopressor, 10 milligrams IV Cardizem followed by initiation of Cardizem fusion  He is also given 250 mcg of digoxin  Heart rate remained 170 - 180s   -continue amiodarone 1 mg/min, Cardizem drip as blood pressure tolerates, digoxin 250 mcg q 6h x3, now with 's   - echo, chest x-ray pending   - troponin elevated 1 26, decreased to 1 18  - Patient refuses to be shocked again unless he decompensates and it becomes absolutely necessary in order to save his life    He is agreeable to medical management of his arrhythmia and possibility of pacemaker      Pulm:   - Acute on chronic COPD   - Improved, BiPAP overnight due to apperance of increased work of breathing, stable on 4L NC this morning    - Continue Xopenex and Atrovent q6, Pulmicort BID, and Solumedrol 40 mg q8  - CXR pending     GI:   - dysphagia 2 mechanical soft diet  - lactulose     :    - Cr 0 77  - continue to monitor     F/E/N:   - not on fluids  - electrolytes   Na 142   K 3 6 - currently being repleted as per night team   Cl 104   Mag 2 3    Phos 0 7 - given phos  - dysphagia 2 mechanical soft diet     ID:  - afebrile overnight, new leukocytosis last night 15 07   - no source of infection, CXR pending     Heme:   - Hb 12 5, platelets improved to 76, continue to monitor  - Continue Heparin DVT prophylaxis     Endo:   - Continue SSI#3     Msk/Skin:   - up with assistance  - PT, OT                 Disposition: Continued ICU    Chief Complaint: Anxiety    HPI/24hr events: Events as outlined under Cardiology section of A/P    Physical Exam:   Gen: anxious appearing  CV: irregularly irregular, no murmurs  Lung: CTA bl  Ab: soft, nt, nd  Ext: no edema  Neuro: awake, alert, moves all extremities, follows commands appropriately    Vitals:    18 0322 18 0353 18 0410 18 0439   BP: (!) 127/104 (!) 143/101 119/81 123/88   Pulse: (!) 170 (!) 174 (!) 162 (!) 168   Resp: 18 (!) 37 15 (!) 28   Temp:       TempSrc:       SpO2: 97% 97% 98% 94%   Weight:       Height:                 Temperature:   Temp (24hrs), Av 9 °F (36 6 °C), Min:97 7 °F (36 5 °C), Max:98 °F (36 7 °C)    Current: Temperature: 97 9 °F (36 6 °C)    Weights:   IBW: 63 8 kg    Body mass index is 30 42 kg/m²    Weight (last 2 days)     Date/Time   Weight    18 0634  85 5 (188 49)    18 0600  85 5 (188 49)              Hemodynamic Monitoring:  N/A     Non-Invasive/Invasive Ventilation Settings:  Respiratory    Lab Data (Last 4 hours)    None O2/Vent Data (Last 4 hours)      05/19 0305          Non-Invasive Ventilation Mode BiPAP                 No results found for: PHART, ERQ1STE, PO2ART, ZPB3EPJ, Y0TZXZUJ, BEART, SOURCE  SpO2: SpO2: 93 %    Intake and Outputs:  I/O       05/17 0701 - 05/18 0700 05/18 0701 - 05/19 0700    P  O   540    I V  (mL/kg) 2511 8 (29 4) 1605 4 (18 8)    IV Piggyback  300    Total Intake(mL/kg) 2511 8 (29 4) 2445 4 (28 6)    Urine (mL/kg/hr) 1360 (0 7) 735 (0 4)    Stool 0 (0) 0 (0)    Total Output 1360 735    Net +1151 8 +1710 4          Unmeasured Urine Occurrence 2 x 1 x    Unmeasured Stool Occurrence 4 x 2 x        UOP: 735cc/24hour     Nutrition:        Diet Orders            Start     Ordered    05/17/18 1505  Diet Dysphagia/Modified Consistency; Dysphagia 2-Mechanical Soft; Dysphagia 2-Mechanical Soft; Thin Liquid  Diet effective now     Question Answer Comment   Diet Type Dysphagia/Modified Consistency    Dysphagia/Modified Consistency Dysphagia 2-Mechanical Soft    Other Restriction(s): Dysphagia 2-Mechanical Soft    Liquid Modifier Thin Liquid    RD to adjust diet per protocol? Yes        05/17/18 1507            Labs:     Results from last 7 days  Lab Units 05/18/18  1909 05/18/18  1900 05/18/18  0457 05/17/18  0418  05/15/18  1304   WBC Thousand/uL 15 07*  --  6 91 7 88  < > 10 21*   HEMOGLOBIN g/dL 12 5  --  11 4* 11 3*  < > 14 0   I STAT HEMOGLOBIN g/dl  --  11 9*  --   --   < >  --    HEMATOCRIT % 37 2  --  33 1* 33 4*  < > 39 5   PLATELETS Thousands/uL 76*  --  48* 64*  < > 61*   NEUTROS PCT % 87*  --  89*  --   --  83*   MONOS PCT % 7  --  5  --   --  9   MONO PCT MAN %  --   --   --  1*  < >  --    < > = values in this interval not displayed     Results from last 7 days  Lab Units 05/19/18  0439 05/19/18  0019 05/18/18  1909  05/18/18  0457  05/16/18  0522   SODIUM mmol/L 142 141 140  --  135*  < > 133*   POTASSIUM mmol/L 3 6 3 7 3 2*  --  3 7  < > 4 4   CHLORIDE mmol/L 104 104 104  --  100  < > 97*   CO2 mmol/L 32 31 30  --  27  < > 25   BUN mg/dL 11 13 14  --  16  < > 18   CREATININE mg/dL 0 77 0 84 0 80  --  0 84  < > 1 80*   CALCIUM mg/dL 6 7* 7 0* 6 8*  --  6 3*  < > 6 3*   TOTAL PROTEIN g/dL 5 7*  --   --   --  5 2*  --  6 4   BILIRUBIN TOTAL mg/dL 0 70  --   --   --  0 46  --  0 64   ALK PHOS U/L 170*  --   --   --  152*  --  190*   ALT U/L 62  --   --   --  46  --  71   AST U/L 100*  --   --   --  63*  --  166*   GLUCOSE RANDOM mg/dL 182* 185* 193*  --  202*  < > 198*   GLUCOSE, ISTAT   --   --   --   < >  --   --   --    < > = values in this interval not displayed  Results from last 7 days  Lab Units 05/19/18  0439 05/19/18  0019 05/18/18  1909   MAGNESIUM mg/dL 2 3 2 3 2 5       Results from last 7 days  Lab Units 05/19/18  0439 05/19/18  0019 05/18/18  1909   PHOSPHORUS mg/dL 0 7* 0 7* 0 7*        Results from last 7 days  Lab Units 05/15/18  1305   INR  1 05   PTT seconds 27       Results from last 7 days  Lab Units 05/16/18  0722   LACTIC ACID mmol/L 1 6       0  Lab Value Date/Time   TROPONINI 0 29 (H) 05/19/2018 0438   TROPONINI 0 30 (H) 05/19/2018 0154   TROPONINI 0 32 (H) 05/18/2018 2229   TROPONINI 0 32 (H) 05/18/2018 1909   TROPONINI 1 15 (H) 05/16/2018 1416   TROPONINI 1 24 (H) 05/16/2018 0522   TROPONINI 1 16 (H) 05/16/2018 0209   TROPONINI 1 09 (H) 05/16/2018 0053   TROPONINI 0 85 (H) 05/15/2018 1935   TROPONINI 0 72 (H) 05/15/2018 1728   TROPONINI 0 47 (H) 05/15/2018 1304   TROPONINI 0 02 12/07/2016 1931   TROPONINI 0 04 (H) 12/07/2016 0907   TROPONINI <0 04 05/22/2015 0123       Imaging:  I have personally reviewed pertinent reports  EKG: Reviewed    Micro:  Lab Results   Component Value Date    BLOODCX No Growth at 72 hrs  05/15/2018    BLOODCX No Growth at 72 hrs  05/15/2018       Allergies:    Allergies   Allergen Reactions    Other      Brown cloth band aids          Medications:   Scheduled Meds:  Current Facility-Administered Medications:  amiodarone 1 mg/min Intravenous Continuous James Sheikh MD Last Rate: 1 mg/min (05/18/18 1917)   amiodarone        budesonide 0 5 mg Nebulization Q12H Espinoza Vides PA-C    calcium chloride 1 g Intravenous Once Belen Renee MD    digoxin 250 mcg Intravenous Inessa Thompson MD    diltiazem 1-15 mg/hr Intravenous Titrated Espinoza Vides PA-C Last Rate: 15 mg/hr (05/19/18 0431)   heparin (porcine) 5,000 Units Subcutaneous [facility] AMALIA Clifton    insulin lispro 2-12 Units Subcutaneous 4 times day Yajaira Barrett DO    ipratropium 0 5 mg Nebulization Q6H Espinoza Vides PA-C    lactulose 20 g Oral Daily MARIE Justin-C    levalbuterol 1 25 mg Nebulization Q6H Espinoza Vides, PA-C    LORazepam 0 5 mg Intravenous Q2H PRN Balbir Ruthton CRNP    Or        LORazepam 1 mg Intravenous Q2H PRN Balbir RuthtonABDOULNP    Or        LORazepam 2 mg Intravenous Q2H PRN Balbir Ruthton CRNP    methylPREDNISolone sodium succinate 40 mg Intravenous [facility] Balbir RuthtonAMALIA umana    nicotine 14 mg Transdermal Daily Balbir Ruthton, CRNP    oxazepam 15 mg Oral Q6H [address] Sander Thomas MD    potassium phosphate 12 mmol Intravenous Once Belen Renee MD    potassium-sodium phosphateS 2 tablet Oral 4x Daily (with meals and at bedtime) MATT JustinC      Continuous Infusions:  amiodarone 1 mg/min Last Rate: 1 mg/min (05/18/18 1917)   diltiazem 1-15 mg/hr Last Rate: 15 mg/hr (05/19/18 0431)     PRN Meds:    LORazepam 0 5 mg Q2H PRN   Or     LORazepam 1 mg Q2H PRN   Or     LORazepam 2 mg Q2H PRN       VTE Pharmacologic Prophylaxis: Heparin  VTE Mechanical Prophylaxis: sequential compression device    Invasive lines and devices:   Invasive Devices     Peripheral Intravenous Line            Peripheral IV 05/15/18 Left Antecubital 3 days    Peripheral IV 05/15/18 Right Antecubital 3 days    Peripheral IV 05/18/18 Right;Upper Arm less than 1 day    Peripheral IV 05/18/18 Right;Upper Arm less than 1 day                      Code Status: Level 1 - Full Code     Portions of the record may have been created with voice recognition software  Occasional wrong word or "sound a like" substitutions may have occurred due to the inherent limitations of voice recognition software  Read the chart carefully and recognize, using context, where substitutions have occurred       Davian Montes De Oca MD

## 2018-05-20 ENCOUNTER — APPOINTMENT (INPATIENT)
Dept: RADIOLOGY | Facility: HOSPITAL | Age: 58
DRG: 710 | End: 2018-05-20
Payer: COMMERCIAL

## 2018-05-20 LAB
ANION GAP SERPL CALCULATED.3IONS-SCNC: 3 MMOL/L (ref 4–13)
ANION GAP SERPL CALCULATED.3IONS-SCNC: 4 MMOL/L (ref 4–13)
ATRIAL RATE: 100 BPM
ATRIAL RATE: 87 BPM
BACTERIA BLD CULT: NORMAL
BACTERIA BLD CULT: NORMAL
BASOPHILS # BLD AUTO: 0.03 THOUSANDS/ΜL (ref 0–0.1)
BASOPHILS NFR BLD AUTO: 0 % (ref 0–1)
BUN SERPL-MCNC: 10 MG/DL (ref 5–25)
BUN SERPL-MCNC: 12 MG/DL (ref 5–25)
CALCIUM SERPL-MCNC: 6.8 MG/DL (ref 8.3–10.1)
CALCIUM SERPL-MCNC: 7.1 MG/DL (ref 8.3–10.1)
CHLORIDE SERPL-SCNC: 104 MMOL/L (ref 100–108)
CHLORIDE SERPL-SCNC: 106 MMOL/L (ref 100–108)
CHOLEST SERPL-MCNC: 145 MG/DL (ref 50–200)
CO2 SERPL-SCNC: 33 MMOL/L (ref 21–32)
CO2 SERPL-SCNC: 35 MMOL/L (ref 21–32)
CREAT SERPL-MCNC: 0.66 MG/DL (ref 0.6–1.3)
CREAT SERPL-MCNC: 0.87 MG/DL (ref 0.6–1.3)
DIGOXIN SERPL-MCNC: 1.6 NG/ML (ref 0.8–2)
EOSINOPHIL # BLD AUTO: 0 THOUSAND/ΜL (ref 0–0.61)
EOSINOPHIL NFR BLD AUTO: 0 % (ref 0–6)
ERYTHROCYTE [DISTWIDTH] IN BLOOD BY AUTOMATED COUNT: 17.3 % (ref 11.6–15.1)
GFR SERPL CREATININE-BSD FRML MDRD: 109 ML/MIN/1.73SQ M
GFR SERPL CREATININE-BSD FRML MDRD: 97 ML/MIN/1.73SQ M
GLUCOSE SERPL-MCNC: 178 MG/DL (ref 65–140)
GLUCOSE SERPL-MCNC: 187 MG/DL (ref 65–140)
GLUCOSE SERPL-MCNC: 194 MG/DL (ref 65–140)
GLUCOSE SERPL-MCNC: 211 MG/DL (ref 65–140)
GLUCOSE SERPL-MCNC: 216 MG/DL (ref 65–140)
GLUCOSE SERPL-MCNC: 217 MG/DL (ref 65–140)
HCT VFR BLD AUTO: 32.8 % (ref 36.5–49.3)
HDLC SERPL-MCNC: 67 MG/DL (ref 40–60)
HGB BLD-MCNC: 10.9 G/DL (ref 12–17)
LDLC SERPL CALC-MCNC: 44 MG/DL (ref 0–100)
LIPASE SERPL-CCNC: 718 U/L (ref 73–393)
LYMPHOCYTES # BLD AUTO: 0.58 THOUSANDS/ΜL (ref 0.6–4.47)
LYMPHOCYTES NFR BLD AUTO: 6 % (ref 14–44)
MAGNESIUM SERPL-MCNC: 2.1 MG/DL (ref 1.6–2.6)
MAGNESIUM SERPL-MCNC: 2.3 MG/DL (ref 1.6–2.6)
MCH RBC QN AUTO: 32.9 PG (ref 26.8–34.3)
MCHC RBC AUTO-ENTMCNC: 33.2 G/DL (ref 31.4–37.4)
MCV RBC AUTO: 99 FL (ref 82–98)
MONOCYTES # BLD AUTO: 1.03 THOUSAND/ΜL (ref 0.17–1.22)
MONOCYTES NFR BLD AUTO: 10 % (ref 4–12)
NEUTROPHILS # BLD AUTO: 8.38 THOUSANDS/ΜL (ref 1.85–7.62)
NEUTS SEG NFR BLD AUTO: 83 % (ref 43–75)
NRBC BLD AUTO-RTO: 2 /100 WBCS
P AXIS: 57 DEGREES
PHOSPHATE SERPL-MCNC: 0.7 MG/DL (ref 2.7–4.5)
PHOSPHATE SERPL-MCNC: 2.7 MG/DL (ref 2.7–4.5)
PLATELET # BLD AUTO: 63 THOUSANDS/UL (ref 149–390)
PMV BLD AUTO: 10.6 FL (ref 8.9–12.7)
POTASSIUM SERPL-SCNC: 3.9 MMOL/L (ref 3.5–5.3)
POTASSIUM SERPL-SCNC: 4.1 MMOL/L (ref 3.5–5.3)
PR INTERVAL: 121 MS
QRS AXIS: 94 DEGREES
QRS AXIS: 99 DEGREES
QRSD INTERVAL: 88 MS
QRSD INTERVAL: 92 MS
QT INTERVAL: 354 MS
QT INTERVAL: 375 MS
QTC INTERVAL: 452 MS
QTC INTERVAL: 457 MS
RBC # BLD AUTO: 3.31 MILLION/UL (ref 3.88–5.62)
SODIUM SERPL-SCNC: 142 MMOL/L (ref 136–145)
SODIUM SERPL-SCNC: 143 MMOL/L (ref 136–145)
T WAVE AXIS: 246 DEGREES
T WAVE AXIS: 247 DEGREES
TRIGL SERPL-MCNC: 171 MG/DL
TROPONIN I SERPL-MCNC: 0.12 NG/ML
TROPONIN I SERPL-MCNC: 0.17 NG/ML
TROPONIN I SERPL-MCNC: 0.17 NG/ML
VENTRICULAR RATE: 100 BPM
VENTRICULAR RATE: 87 BPM
WBC # BLD AUTO: 10.15 THOUSAND/UL (ref 4.31–10.16)

## 2018-05-20 PROCEDURE — 94640 AIRWAY INHALATION TREATMENT: CPT

## 2018-05-20 PROCEDURE — 83690 ASSAY OF LIPASE: CPT | Performed by: PHYSICIAN ASSISTANT

## 2018-05-20 PROCEDURE — 82948 REAGENT STRIP/BLOOD GLUCOSE: CPT

## 2018-05-20 PROCEDURE — 84100 ASSAY OF PHOSPHORUS: CPT | Performed by: FAMILY MEDICINE

## 2018-05-20 PROCEDURE — 84100 ASSAY OF PHOSPHORUS: CPT | Performed by: PHYSICIAN ASSISTANT

## 2018-05-20 PROCEDURE — 80048 BASIC METABOLIC PNL TOTAL CA: CPT | Performed by: STUDENT IN AN ORGANIZED HEALTH CARE EDUCATION/TRAINING PROGRAM

## 2018-05-20 PROCEDURE — 94760 N-INVAS EAR/PLS OXIMETRY 1: CPT

## 2018-05-20 PROCEDURE — 93010 ELECTROCARDIOGRAM REPORT: CPT | Performed by: INTERNAL MEDICINE

## 2018-05-20 PROCEDURE — 84484 ASSAY OF TROPONIN QUANT: CPT | Performed by: FAMILY MEDICINE

## 2018-05-20 PROCEDURE — 99232 SBSQ HOSP IP/OBS MODERATE 35: CPT | Performed by: INTERNAL MEDICINE

## 2018-05-20 PROCEDURE — 83735 ASSAY OF MAGNESIUM: CPT | Performed by: FAMILY MEDICINE

## 2018-05-20 PROCEDURE — 93005 ELECTROCARDIOGRAM TRACING: CPT

## 2018-05-20 PROCEDURE — 80162 ASSAY OF DIGOXIN TOTAL: CPT | Performed by: PHYSICIAN ASSISTANT

## 2018-05-20 PROCEDURE — 94003 VENT MGMT INPAT SUBQ DAY: CPT

## 2018-05-20 PROCEDURE — 80061 LIPID PANEL: CPT | Performed by: FAMILY MEDICINE

## 2018-05-20 PROCEDURE — 99233 SBSQ HOSP IP/OBS HIGH 50: CPT | Performed by: INTERNAL MEDICINE

## 2018-05-20 PROCEDURE — 71045 X-RAY EXAM CHEST 1 VIEW: CPT

## 2018-05-20 PROCEDURE — 80048 BASIC METABOLIC PNL TOTAL CA: CPT | Performed by: FAMILY MEDICINE

## 2018-05-20 PROCEDURE — 85025 COMPLETE CBC W/AUTO DIFF WBC: CPT | Performed by: STUDENT IN AN ORGANIZED HEALTH CARE EDUCATION/TRAINING PROGRAM

## 2018-05-20 RX ORDER — POTASSIUM CHLORIDE 20MEQ/15ML
20 LIQUID (ML) ORAL ONCE
Status: COMPLETED | OUTPATIENT
Start: 2018-05-20 | End: 2018-05-20

## 2018-05-20 RX ORDER — HEPARIN SODIUM 1000 [USP'U]/ML
2000 INJECTION, SOLUTION INTRAVENOUS; SUBCUTANEOUS AS NEEDED
Status: DISCONTINUED | OUTPATIENT
Start: 2018-05-20 | End: 2018-05-20

## 2018-05-20 RX ORDER — ATORVASTATIN CALCIUM 40 MG/1
40 TABLET, FILM COATED ORAL
Status: DISCONTINUED | OUTPATIENT
Start: 2018-05-20 | End: 2018-06-01 | Stop reason: HOSPADM

## 2018-05-20 RX ORDER — MAGNESIUM SULFATE 1 G/100ML
1 INJECTION INTRAVENOUS ONCE
Status: COMPLETED | OUTPATIENT
Start: 2018-05-20 | End: 2018-05-20

## 2018-05-20 RX ORDER — HEPARIN SODIUM 10000 [USP'U]/100ML
3-20 INJECTION, SOLUTION INTRAVENOUS
Status: DISCONTINUED | OUTPATIENT
Start: 2018-05-20 | End: 2018-05-20

## 2018-05-20 RX ORDER — HEPARIN SODIUM 1000 [USP'U]/ML
4000 INJECTION, SOLUTION INTRAVENOUS; SUBCUTANEOUS ONCE
Status: COMPLETED | OUTPATIENT
Start: 2018-05-20 | End: 2018-05-20

## 2018-05-20 RX ORDER — ASPIRIN 81 MG/1
324 TABLET, CHEWABLE ORAL DAILY
Status: DISCONTINUED | OUTPATIENT
Start: 2018-05-20 | End: 2018-05-20

## 2018-05-20 RX ORDER — HEPARIN SODIUM 1000 [USP'U]/ML
4000 INJECTION, SOLUTION INTRAVENOUS; SUBCUTANEOUS AS NEEDED
Status: DISCONTINUED | OUTPATIENT
Start: 2018-05-20 | End: 2018-05-20

## 2018-05-20 RX ORDER — ASPIRIN 81 MG/1
81 TABLET, CHEWABLE ORAL DAILY
Status: DISCONTINUED | OUTPATIENT
Start: 2018-05-21 | End: 2018-05-22

## 2018-05-20 RX ORDER — FUROSEMIDE 10 MG/ML
40 INJECTION INTRAMUSCULAR; INTRAVENOUS ONCE
Status: COMPLETED | OUTPATIENT
Start: 2018-05-20 | End: 2018-05-20

## 2018-05-20 RX ORDER — PROPOFOL 10 MG/ML
5-50 INJECTION, EMULSION INTRAVENOUS
Status: DISCONTINUED | OUTPATIENT
Start: 2018-05-20 | End: 2018-05-21

## 2018-05-20 RX ORDER — FENTANYL CITRATE 50 UG/ML
INJECTION, SOLUTION INTRAMUSCULAR; INTRAVENOUS
Status: COMPLETED
Start: 2018-05-20 | End: 2018-05-20

## 2018-05-20 RX ADMIN — CHLORHEXIDINE GLUCONATE 15 ML: 1.2 RINSE ORAL at 21:06

## 2018-05-20 RX ADMIN — OXAZEPAM 15 MG: 15 CAPSULE, GELATIN COATED ORAL at 17:21

## 2018-05-20 RX ADMIN — LEVALBUTEROL HYDROCHLORIDE 1.25 MG: 1.25 SOLUTION, CONCENTRATE RESPIRATORY (INHALATION) at 00:58

## 2018-05-20 RX ADMIN — FENTANYL CITRATE 50 MCG/HR: 50 INJECTION, SOLUTION INTRAMUSCULAR; INTRAVENOUS at 12:37

## 2018-05-20 RX ADMIN — INSULIN LISPRO 10 UNITS: 100 INJECTION, SOLUTION INTRAVENOUS; SUBCUTANEOUS at 17:40

## 2018-05-20 RX ADMIN — PROPOFOL 22.6 MCG/KG/MIN: 10 INJECTION, EMULSION INTRAVENOUS at 13:23

## 2018-05-20 RX ADMIN — PROPOFOL 50 MCG/KG/MIN: 10 INJECTION, EMULSION INTRAVENOUS at 05:59

## 2018-05-20 RX ADMIN — ATORVASTATIN CALCIUM 40 MG: 40 TABLET, FILM COATED ORAL at 17:21

## 2018-05-20 RX ADMIN — METHYLPREDNISOLONE SODIUM SUCCINATE 40 MG: 40 INJECTION, POWDER, FOR SOLUTION INTRAMUSCULAR; INTRAVENOUS at 21:07

## 2018-05-20 RX ADMIN — OXAZEPAM 15 MG: 15 CAPSULE, GELATIN COATED ORAL at 00:06

## 2018-05-20 RX ADMIN — FUROSEMIDE 40 MG: 10 INJECTION, SOLUTION INTRAMUSCULAR; INTRAVENOUS at 12:48

## 2018-05-20 RX ADMIN — POTASSIUM CHLORIDE 20 MEQ: 20 SOLUTION ORAL at 16:14

## 2018-05-20 RX ADMIN — PROPOFOL 30 MCG/KG/MIN: 10 INJECTION, EMULSION INTRAVENOUS at 18:38

## 2018-05-20 RX ADMIN — IPRATROPIUM BROMIDE 0.5 MG: 0.5 SOLUTION RESPIRATORY (INHALATION) at 00:58

## 2018-05-20 RX ADMIN — METHYLPREDNISOLONE SODIUM SUCCINATE 40 MG: 40 INJECTION, POWDER, FOR SOLUTION INTRAMUSCULAR; INTRAVENOUS at 05:09

## 2018-05-20 RX ADMIN — IPRATROPIUM BROMIDE 0.5 MG: 0.5 SOLUTION RESPIRATORY (INHALATION) at 13:18

## 2018-05-20 RX ADMIN — INSULIN LISPRO 5 UNITS: 100 INJECTION, SOLUTION INTRAVENOUS; SUBCUTANEOUS at 12:00

## 2018-05-20 RX ADMIN — APIXABAN 5 MG: 5 TABLET, FILM COATED ORAL at 14:10

## 2018-05-20 RX ADMIN — LEVALBUTEROL HYDROCHLORIDE 1.25 MG: 1.25 SOLUTION, CONCENTRATE RESPIRATORY (INHALATION) at 19:08

## 2018-05-20 RX ADMIN — HEPARIN SODIUM AND DEXTROSE 11.8 UNITS/KG/HR: 10000; 5 INJECTION INTRAVENOUS at 12:43

## 2018-05-20 RX ADMIN — PROPOFOL 50 MCG/KG/MIN: 10 INJECTION, EMULSION INTRAVENOUS at 02:21

## 2018-05-20 RX ADMIN — NICOTINE 14 MG: 14 PATCH, EXTENDED RELEASE TRANSDERMAL at 08:04

## 2018-05-20 RX ADMIN — LACTULOSE 20 G: 20 SOLUTION ORAL at 08:02

## 2018-05-20 RX ADMIN — FENTANYL CITRATE 50 MCG: 50 INJECTION INTRAMUSCULAR; INTRAVENOUS at 11:57

## 2018-05-20 RX ADMIN — FOLIC ACID 1 MG: 5 INJECTION, SOLUTION INTRAMUSCULAR; INTRAVENOUS; SUBCUTANEOUS at 12:52

## 2018-05-20 RX ADMIN — SODIUM PHOSPHATE, MONOBASIC, MONOHYDRATE 30 MMOL: 276; 142 INJECTION, SOLUTION INTRAVENOUS at 16:02

## 2018-05-20 RX ADMIN — HEPARIN SODIUM 4000 UNITS: 1000 INJECTION, SOLUTION INTRAVENOUS; SUBCUTANEOUS at 12:43

## 2018-05-20 RX ADMIN — METHYLPREDNISOLONE SODIUM SUCCINATE 40 MG: 40 INJECTION, POWDER, FOR SOLUTION INTRAMUSCULAR; INTRAVENOUS at 14:31

## 2018-05-20 RX ADMIN — IPRATROPIUM BROMIDE 0.5 MG: 0.5 SOLUTION RESPIRATORY (INHALATION) at 19:07

## 2018-05-20 RX ADMIN — BUDESONIDE 0.5 MG: 0.5 INHALANT RESPIRATORY (INHALATION) at 07:27

## 2018-05-20 RX ADMIN — BUDESONIDE 0.5 MG: 0.5 INHALANT RESPIRATORY (INHALATION) at 19:07

## 2018-05-20 RX ADMIN — AMIODARONE HYDROCHLORIDE 0.5 MG/MIN: 50 INJECTION, SOLUTION INTRAVENOUS at 18:38

## 2018-05-20 RX ADMIN — HEPARIN SODIUM 5000 UNITS: 5000 INJECTION, SOLUTION INTRAVENOUS; SUBCUTANEOUS at 05:09

## 2018-05-20 RX ADMIN — LORAZEPAM 2 MG: 2 INJECTION INTRAMUSCULAR; INTRAVENOUS at 04:52

## 2018-05-20 RX ADMIN — INSULIN LISPRO 5 UNITS: 100 INJECTION, SOLUTION INTRAVENOUS; SUBCUTANEOUS at 06:01

## 2018-05-20 RX ADMIN — POTASSIUM CHLORIDE 20 MEQ: 20 SOLUTION ORAL at 08:02

## 2018-05-20 RX ADMIN — ASPIRIN 81 MG 324 MG: 81 TABLET ORAL at 12:48

## 2018-05-20 RX ADMIN — IPRATROPIUM BROMIDE 0.5 MG: 0.5 SOLUTION RESPIRATORY (INHALATION) at 07:27

## 2018-05-20 RX ADMIN — OXAZEPAM 15 MG: 15 CAPSULE, GELATIN COATED ORAL at 05:10

## 2018-05-20 RX ADMIN — APIXABAN 5 MG: 5 TABLET, FILM COATED ORAL at 17:21

## 2018-05-20 RX ADMIN — LEVALBUTEROL HYDROCHLORIDE 1.25 MG: 1.25 SOLUTION, CONCENTRATE RESPIRATORY (INHALATION) at 07:27

## 2018-05-20 RX ADMIN — DIBASIC SODIUM PHOSPHATE, MONOBASIC POTASSIUM PHOSPHATE AND MONOBASIC SODIUM PHOSPHATE 2 TABLET: 852; 155; 130 TABLET ORAL at 17:21

## 2018-05-20 RX ADMIN — METOPROLOL TARTRATE 25 MG: 25 TABLET ORAL at 10:15

## 2018-05-20 RX ADMIN — OXAZEPAM 15 MG: 15 CAPSULE, GELATIN COATED ORAL at 12:16

## 2018-05-20 RX ADMIN — PROPOFOL 48 MCG/KG/MIN: 10 INJECTION, EMULSION INTRAVENOUS at 09:02

## 2018-05-20 RX ADMIN — PROPOFOL 25 MCG/KG/MIN: 10 INJECTION, EMULSION INTRAVENOUS at 23:17

## 2018-05-20 RX ADMIN — MAGNESIUM SULFATE HEPTAHYDRATE 1 G: 1 INJECTION, SOLUTION INTRAVENOUS at 17:06

## 2018-05-20 RX ADMIN — METOPROLOL TARTRATE 25 MG: 25 TABLET ORAL at 21:06

## 2018-05-20 RX ADMIN — THIAMINE HYDROCHLORIDE 100 MG: 100 INJECTION, SOLUTION INTRAMUSCULAR; INTRAVENOUS at 13:01

## 2018-05-20 RX ADMIN — DIBASIC SODIUM PHOSPHATE, MONOBASIC POTASSIUM PHOSPHATE AND MONOBASIC SODIUM PHOSPHATE 2 TABLET: 852; 155; 130 TABLET ORAL at 21:06

## 2018-05-20 RX ADMIN — CHLORHEXIDINE GLUCONATE 15 ML: 1.2 RINSE ORAL at 08:03

## 2018-05-20 RX ADMIN — LORAZEPAM 2 MG: 2 INJECTION INTRAMUSCULAR; INTRAVENOUS at 02:26

## 2018-05-20 RX ADMIN — LEVALBUTEROL HYDROCHLORIDE 1.25 MG: 1.25 SOLUTION, CONCENTRATE RESPIRATORY (INHALATION) at 13:18

## 2018-05-20 NOTE — PROGRESS NOTES
Cardiology Progress Note - Verito Martinez 56 y o  male MRN: [id number]    [facility] En[facility]: [id number]      Assessment:  Principal Problem:    Acute respiratory failure with hypoxia [facility])  Active Problems:    Colitis    Coronary artery disease involving native coronary artery of native heart    COPD with exacerbation (HCC)    Shock ([facility] [address] )    Lactic acidosis    High anion gap metabolic acidosis    Acute kidney failure (HCC)    Transaminitis    Elevated troponin    Hypokalemia    Hyponatremia    Alcohol abuse    Tobacco abuse    Alcohol dependence with withdrawal ([facility])    Bronchospasm, acute    Agitation    Assessment and plan     1  Atrial fibrillation with RVR: rates  on telemetry  Cardizem has been discontinued and digoxin load is been completed  Would continue amiodarone at 0 5 mg per minute  Blood pressures stable  Continue metoprolol tartrate 25 mg q 12h  Hold off on starting any daily dose of digoxin at this time  continue treatment for alcohol withdrawal and agitation  recent TSH was within normal limits  Replete to keep potassium more than 4 5, Mg > 2 5  Echocardiogram on 05/19/2018 revealed a new drop in LVEF 25% with diffuse hypokinesis with regional variations, this is new compared to 2016  Could be related to tachycardia versus alcoholic cardiomyopathy  On EKG he has old septal Q-waves and minimal ST depressions in the inferior leads  Mild troponin elevation related to demand ischemia  Repeat a limited echo  for function after better heart rate control  [id number] score is now 2,  recommend anticoagulation with Eliquis 5 mg b i d  2 History of CAD: Inferior MI status post RCA stent in 2012  Cardiac catheterization in 2016 for inferior ST elevations revealed patent RCA stent  Was treated for pericarditis with colchicine and aspirin at this time  Restart aspirin, atorvastatin  He is noncompliant with his medications  3  Alcohol abuse:   The treated for alcohol withdrawal with DTs , intubated and is on propofol  4 Hypertension:  Pressure at goal on metoprolol 25 q  12 hours   5  Hyperlipidemia:On atorvastatin 40 HS   6   History of Crohn's disease  7  Mild aortic stenosis    Subjective:   Patient seen and examined bedside  Awake, intubated    Telemetry:  Atrial fibrillation to rate of 100-110s   Objective:     Vitals: Blood pressure 112/74, pulse 104, temperature 99 3 °F (37 4 °C), temperature source Probe, resp  rate (!) 23, height 5' 6" (1 676 m), weight 85 5 kg (188 lb 7 9 oz), SpO2 95 %  , Body mass index is 30 42 kg/m² , Orthostatic Blood Pressures      Most Recent Value   Blood Pressure  112/74 filed at 05/20/2018 1010   Patient Position - Orthostatic VS  Lying filed at 05/20/2018 1010            Intake/Output Summary (Last 24 hours) at 05/20/18 1100  Last data filed at 05/20/18 1020   Gross per 24 hour   Intake          2752 57 ml   Output             1137 ml   Net          1615 57 ml             Current Facility-Administered Medications:     amiodarone (CORDARONE) 900 mg in dextrose 5 % 500 mL infusion, 0 5 mg/min, Intravenous, Continuous, Macie Ray PA-C, Last Rate: 16 7 mL/hr at 05/19/18 1127, 0 5 mg/min at 05/19/18 1127    aspirin (ECOTRIN) EC tablet 325 mg, 325 mg, Oral, Daily, Macie Ray PA-C    budesonide (PULMICORT) inhalation solution 0 5 mg, 0 5 mg, Nebulization, Q12H, Jerel Garcia PA-C, 0 5 mg at 05/20/18 0727    chlorhexidine (PERIDEX) 0 12 % oral rinse 15 mL, 15 mL, Swish & Spit, Q12H [facility], Macie Ray PA-C, 15 mL at 05/20/18 0803    diltiazem (CARDIZEM) 125 mg in sodium chloride 0 9 % 125 mL infusion, 1-15 mg/hr, Intravenous, Titrated, Jerel Garcia PA-C, Stopped at 05/19/18 1445    heparin (porcine) subcutaneous injection 5,000 Units, 5,000 Units, Subcutaneous, Q8H [facility], 5,000 Units at 05/20/18 0509 **AND** Platelet count, , , Once, AMALIA Valenzuela    insulin lispro (HumaLOG) 100 units/mL subcutaneous injection 5-25 Units, 5-25 Units, Subcutaneous, Q6H [facility], 5 Units at 05/20/18 0601 **AND** Fingerstick Glucose (POCT), , , Q6H, Cindi Cuevas PA-C    ipratropium (ATROVENT) 0 02 % inhalation solution 0 5 mg, 0 5 mg, Nebulization, Q6H, Rashaun Arango PA-C, 0 5 mg at 05/20/18 6211    lactulose 20 g/30 mL oral solution 20 g, 20 g, Oral, Daily, Rashaun Arango PA-C, 20 g at 05/20/18 0802    levalbuterol [facility]) inhalation solution 1 25 mg, 1 25 mg, Nebulization, Q6H, Rashaun Arango PA-C, 1 25 mg at 05/20/18 0727    LORazepam (ATIVAN) 2 mg/mL injection 0 5 mg, 0 5 mg, Intravenous, Q2H PRN **OR** LORazepam (ATIVAN) 2 mg/mL injection 1 mg, 1 mg, Intravenous, Q2H PRN, 1 mg at 05/16/18 0028 **OR** LORazepam (ATIVAN) 2 mg/mL injection 2 mg, 2 mg, Intravenous, Q2H PRN, AMALIA Mcdonald, 2 mg at 05/20/18 0452    methylPREDNISolone sodium succinate (Solu-MEDROL) injection 40 mg, 40 mg, Intravenous, Q8H [facility], AMALIA Mcdonald, 40 mg at 05/20/18 0509    metoprolol (LOPRESSOR) injection 5 mg, 5 mg, Intravenous, Q6H PRN, Cindi Cuevas PA-C, 5 mg at 05/19/18 1152    metoprolol tartrate (LOPRESSOR) tablet 25 mg, 25 mg, Oral, Q12H [facility], Cindi Cuevas PA-C, 25 mg at 05/20/18 1015    nicotine (NICODERM CQ) 14 mg/24hr TD 24 hr patch 14 mg, 14 mg, Transdermal, Daily, AMALIA Mcdonald, 14 mg at 05/20/18 0804    oxazepam (SERAX) capsule 15 mg, 15 mg, Oral, Q6H [facility], Brittanie Romero MD, 15 mg at 05/20/18 0510    propofol (DIPRIVAN) 1000 mg in 100 mL infusion (premix), 5-50 mcg/kg/min, Intravenous, Titrated, Cindi Cuevas PA-C, Last Rate: 24 1 mL/hr at 05/20/18 1008, 47 mcg/kg/min at 05/20/18 1008    Labs & Results:    Lab Results   Component Value Date    CKTOTAL 295 05/15/2018    CKMBINDEX 3 1 (H) 05/15/2018    TROPONINI 0 29 (H) 05/19/2018    TROPONINI 0 30 (H) 05/19/2018    TROPONINI 0 32 (H) 05/18/2018       Lab Results   Component Value Date    GLUCOSE 178 (H) 05/20/2018    CALCIUM 6 8 (L) 05/20/2018     05/20/2018    K 3 9 05/20/2018    CO2 33 (H) 05/20/2018     05/20/2018    BUN 10 05/20/2018    CREATININE 0 66 05/20/2018       Lab Results   Component Value Date    WBC 10 15 05/20/2018    HGB 10 9 (L) 05/20/2018    HCT 32 8 (L) 05/20/2018    MCV 99 (H) 05/20/2018    PLT 63 (L) 05/20/2018       Results from last 7 days  Lab Units 05/15/18  1305   INR  1 05       Lab Results   Component Value Date    CHOL 131 12/08/2016     Lab Results   Component Value Date    HDL 60 12/08/2016     Lab Results   Component Value Date    LDLCALC 47 12/08/2016     Lab Results   Component Value Date    TRIG 120 12/08/2016       Lab Results   Component Value Date    ALT 62 05/19/2018     (H) 05/19/2018

## 2018-05-20 NOTE — RESPIRATORY THERAPY NOTE
RT Ventilator Management Note  Sherrell Aguilar 56 y o  male MRN: [id number]  Unit/Bed#: [facility] 06 Encounter: [id number]      Daily Screen     No data found              Physical Exam:   Assessment Type: (P) Assess only  General Appearance: (P) Sedated  Respiratory Pattern: (P) Symmetrical, Assisted  Chest Assessment: (P) Chest expansion symmetrical  Bilateral Breath Sounds: (P) Diminished  O2 Device: (P) vent  Subjective Data: (P) n/a      Resp Comments: (P) No changes made to the pts vent overnight, pt is tollerating current AC settings, will cont to monitor pt overnight

## 2018-05-20 NOTE — SPEECH THERAPY NOTE
Speech Language/Pathology    Speech/Language Pathology Progress Note    Patient Name: Zenon Owen  YIKNXALEC Date: 5/20/2018     Chart reviewed  Pt now intubated  ST to complete order  Please re-order when pt extubated and appropriate for re-evaluation

## 2018-05-20 NOTE — RESPIRATORY THERAPY NOTE
RT Ventilator Management Note  Yesenia Brownlee 56 y o  male MRN: [id number]  Unit/Bed#: [facility] 06 Encounter: [id number]      Daily Screen     No data found              Physical Exam:   Assessment Type: Assess only  General Appearance: Sedated  Respiratory Pattern: Symmetrical, Assisted  Chest Assessment: Chest expansion symmetrical  Bilateral Breath Sounds: Diminished  O2 Device: vent  Subjective Data: n/a      Resp Comments: (P) pt placed on cpap/ps wean, after 10 minutes pt HR increased, RN request to place pt back on previous settings

## 2018-05-20 NOTE — RESPIRATORY THERAPY NOTE
RT Ventilator Management Note  Laurent Osgood 56 y o  male MRN: [id number]  Unit/Bed#: [facility] 06 Encounter: [id number]      Daily Screen     No data found              Physical Exam:   Assessment Type: Assess only  General Appearance: Sedated  Respiratory Pattern: Symmetrical, Assisted  Chest Assessment: Chest expansion symmetrical  Bilateral Breath Sounds: Diminished  O2 Device: vent  Subjective Data: n/a      Resp Comments: (P) pt appears comfortable on current settings, will continue to monitor

## 2018-05-20 NOTE — NUTRITION
05/20/18 9083   Recommendations/Interventions   Interventions EN continue as ordered   Nutrition Recommendations Continue diet as ordered;Continue EN/PN as ordered;Lab - consider order (specify); Other (specify)  (Maintain current TF rate due to risk of refeeding syndrome- advance TF only when lytes (Phos) WNL and stable   Rec: MVI, Thiamine, Folate, Vit C supplementation due to ETOH abuse and refeeding risk)

## 2018-05-20 NOTE — PROGRESS NOTES
Progress Note - Critical Care   Anirudh Chung 56 y o  male MRN: [id number]  Unit/Bed#: [facility] 06 Encounter: [id number]          ______________________________________________________________________  Assessment and Plan:   Patient Active Problem List   Diagnosis    Colitis    Spinal stenosis of cervical region    Hypertension    Pericarditis    Coronary artery disease involving native coronary artery of native heart    Leukocytosis    Asthma    Atherosclerosis of coronary artery    COPD with exacerbation ([facility] [address] )    Dyslipidemia    Shock ([facility] [address] )    Lactic acidosis    High anion gap metabolic acidosis    Acute kidney failure (HCC)    Transaminitis    Elevated troponin    Hypokalemia    Hyponatremia    Alcohol abuse    Tobacco abuse    Alcohol dependence with withdrawal (HCC)    Acute respiratory failure with hypoxia (HCC)    Bronchospasm, acute    Agitation         Neuro:   - Alert and follows commands on Propofol 50, intubated with AC/VC 14/450/40%/5  Received 2 mg Ativan x2 for agitation overnight  Continue Serax 15 mg daily with CIWA protocol for acute alcohol withdrawal     CV:   - Afib with RVR- Rate controlled at less than 110 overnight, hemodynamically stable at this time  Cardizem discontinued overnight, continue amiodarone drip at 0 5 currently  Continue aspirin  - EKG yesterday showed serial changes indicating evolving septal infarct, troponin down trending 0 32->0 30->0  29  ECHO shows EF 25% with severe diffuse hypokinesis with regional variations, grade 2 diastolic dysfunction  No signs of fluid overload on exam  Will repeat EKG this AM        Pulm:   - Acute hypoxic respiratory failure in the setting of COPD exacerbation- Intubated as above  Continue Solumedrol 40 mg q8, Xopenex/Atrovent q6, Pulmicort BID  GI:   - NG tube with Jevity 1 2 at 30 mL/hr  Continue lactulose  Continue omeprazole       :   - Continue to monitor strict Is and Os     F/E/N:   - K 3 9 today, repleted   - Continue to monitor off fluids     ID:   - Afebrile, continue to monitor off antibiotics     Heme:   - Platelets 63 today and stable, continue Heparin DVT ppx     Endo:   - Continue SSI #4 with goal glucose 140-180      Msk/Skin:   - No acute CC issues, frequent repositioning     Disposition: ICU level care     Code Status: Level 1 - Full Code    ______________________________________________________________________    Chief Complaint: None     24 Hour Events: No acute events overnight, Cardizem drip was discontinued and amiodarone was continue due to low blood pressures  ______________________________________________________________________    Physical Exam:       Physical Exam   Constitutional: He appears well-developed and well-nourished  No distress  HENT:   Head: Normocephalic and atraumatic  Cardiovascular: Normal rate, regular rhythm, normal heart sounds and intact distal pulses  No murmur heard  Pulmonary/Chest: No respiratory distress  He has no wheezes  He has no rales  Diminished breath sounds at bases, no wheezing appreciated  Abdominal: Soft  Bowel sounds are normal  He exhibits no distension  There is no tenderness  There is no rebound and no guarding  Obese abdomen   Musculoskeletal: He exhibits no edema  Neurological: He is alert  Intubated, alert and tremulous with movement of upper extremities, opens eyes spontaneously, follows commands    Skin: Skin is warm  No rash noted  He is not diaphoretic  No pallor  Vitals reviewed      ______________________________________________________________________  Vitals:    18 0445 18 0500 18 0600 18 0609   BP: 119/73      Pulse: (!) 108 (!) 106 80    Resp: (!) 30 (!) 25 14 16   Temp: 98 6 °F (37 °C) 98 6 °F (37 °C) 98 6 °F (37 °C)    TempSrc:       SpO2: 95% 96% 97%    Weight:       Height:                  Temperature:   Temp (24hrs), Av 8 °F (36 6 °C), Min:96 8 °F (36 °C), Max:98 6 °F (37 °C)    Current Temperature: 98 6 °F (37 °C)  Weights:   IBW: 63 8 kg    Body mass index is 30 42 kg/m²  Weight (last 2 days)     Date/Time   Weight    05/18/18 0634  85 5 (188 49)    05/18/18 0600  85 5 (188 49)            Hemodynamic Monitoring:  N/A     Non-Invasive/Invasive Ventilation Settings:  Respiratory    Lab Data (Last 4 hours)    None         O2/Vent Data (Last 4 hours)      05/20 0317           Vent Mode AC/VC       Resp Rate (BPM) (BPM) 14       Vt (mL) (mL) 450       FIO2 (%) (%) 40       PEEP (cmH2O) (cmH2O) 5       MV 6 8                 Lab Results   Component Value Date    PHART 7 385 05/19/2018    WCR3VNA 49 5 (H) 05/19/2018    PO2ART 284 5 (H) 05/19/2018    IPN2UYY 29 0 (H) 05/19/2018    BEART 3 2 05/19/2018    SOURCE Radial, Right 05/19/2018     SpO2: SpO2: 97 %, SpO2 Activity: SpO2 Activity: At Rest, SpO2 Device: O2 Device: Nasal cannula, Capnography:    Intake and Outputs:  I/O       05/18 0701 - 05/19 0700 05/19 0701 - 05/20 0700 05/20 0701 - 05/21 0700    P  O  540      I V  (mL/kg) 1605 4 (18 8) 2267 9 (26 5)     NG/GT  40     IV Piggyback 300 450     Feedings  80     Total Intake(mL/kg) 2445 4 (28 6) 2837 9 (33 2)     Urine (mL/kg/hr) 735 (0 4) 1150 (0 6)     Emesis/NG output  0 (0)     Other  3 (0)     Stool 0 (0)      Total Output 735 1153      Net +1710 4 +1684 9             Unmeasured Urine Occurrence 1 x      Unmeasured Stool Occurrence 2 x          UOP: 150/hour   Nutrition:        Diet Orders            Start     Ordered    05/19/18 1658  Diet Enteral/Parenteral; Tube Feeding No Oral Diet; Jevity 1 2 Feliciano; 50  Diet effective now     Comments:  Begin at 10ml and increase by 10ml q4 h until goal met   Question Answer Comment   Diet Type Enteral/Parenteral    Enteral/Parenteral Tube Feeding No Oral Diet    Tube Feeding Formula: Jevity 1 2 Feliciano    Tube Feeding Continuous rate (mL/hr): 50    RD to adjust diet per protocol?  Yes        05/19/18 5630        TF currently running at 30/hour with a goal of 50/hour  Formula: Jevity 1 2   Labs:     Results from last 7 days  Lab Units 05/20/18  0427 05/19/18  0608 05/18/18  1909  05/18/18  0457   WBC Thousand/uL 10 15  --  15 07*  --  6 91   HEMOGLOBIN g/dL 10 9*  --  12 5  --  11 4*   I STAT HEMOGLOBIN g/dl  --  10 2*  --   < >  --    HEMATOCRIT % 32 8*  --  37 2  --  33 1*   PLATELETS Thousands/uL 63*  --  76*  --  48*   NEUTROS PCT % 83*  --  87*  --  89*   MONOS PCT % 10  --  7  --  5   < > = values in this interval not displayed  Results from last 7 days  Lab Units 05/20/18  0427 05/19/18  1557 05/19/18  2513 05/19/18  0439 05/19/18  0019  05/18/18  0457  05/16/18  0522   SODIUM mmol/L 143  --   --  142 141  < > 135*  < > 133*   POTASSIUM mmol/L 3 9 4 5  --  3 6 3 7  < > 3 7  < > 4 4   CHLORIDE mmol/L 106  --   --  104 104  < > 100  < > 97*   CO2 mmol/L 33*  --   --  32 31  < > 27  < > 25   BUN mg/dL 10  --   --  11 13  < > 16  < > 18   CREATININE mg/dL 0 66  --   --  0 77 0 84  < > 0 84  < > 1 80*   CALCIUM mg/dL 6 8*  --   --  6 7* 7 0*  < > 6 3*  < > 6 3*   TOTAL PROTEIN g/dL  --   --   --  5 7*  --   --  5 2*  --  6 4   BILIRUBIN TOTAL mg/dL  --   --   --  0 70  --   --  0 46  --  0 64   ALK PHOS U/L  --   --   --  170*  --   --  152*  --  190*   ALT U/L  --   --   --  62  --   --  46  --  71   AST U/L  --   --   --  100*  --   --  63*  --  166*   GLUCOSE RANDOM mg/dL 178*  --   --  182* 185*  < > 202*  < > 198*   GLUCOSE, ISTAT mg/dl  --   --  190*  --   --   < >  --   --   --    < > = values in this interval not displayed      Results from last 7 days  Lab Units 05/19/18  0439 05/19/18  0019 05/18/18  1909   MAGNESIUM mg/dL 2 3 2 3 2 5     Lab Results   Component Value Date    PHOS 0 7 (L) 05/19/2018    PHOS 0 7 (L) 05/19/2018    PHOS 0 7 (L) 05/18/2018        Results from last 7 days  Lab Units 05/15/18  1305   INR  1 05   PTT seconds 27       0  Lab Value Date/Time   TROPONINI 0 29 (H) 05/19/2018 0438   TROPONINI 0 30 (H) 05/19/2018 0154   TROPONINI 0 32 (H) 05/18/2018 2229   TROPONINI 0 32 (H) 05/18/2018 1909   TROPONINI 1 15 (H) 05/16/2018 1416   TROPONINI 1 24 (H) 05/16/2018 0522   TROPONINI 1 16 (H) 05/16/2018 0209   TROPONINI 1 09 (H) 05/16/2018 0053   TROPONINI 0 85 (H) 05/15/2018 1935   TROPONINI 0 72 (H) 05/15/2018 1728   TROPONINI 0 47 (H) 05/15/2018 1304   TROPONINI 0 02 12/07/2016 1931   TROPONINI 0 04 (H) 12/07/2016 0907   TROPONINI <0 04 05/22/2015 0123       Results from last 7 days  Lab Units 05/16/18  0722 05/16/18  0413 05/16/18  0209   LACTIC ACID mmol/L 1 6 2 8* 3 8*     ABG:  Lab Results   Component Value Date    PHART 7 385 05/19/2018    HEX3NON 49 5 (H) 05/19/2018    PO2ART 284 5 (H) 05/19/2018    [id number] 29 0 (H) 05/19/2018    BEART 3 2 05/19/2018    SOURCE Radial, Right 05/19/2018     Imaging:  I have personally reviewed pertinent reports  EKG: Sinus rhythm with PVC, serial changes of evolving septal infarct  Micro:  Lab Results   Component Value Date    BLOODCX No Growth After 4 Days  05/15/2018    BLOODCX No Growth After 4 Days  05/15/2018     Allergies:    Allergies   Allergen Reactions    Other      Brown cloth band aids        Medications:   Scheduled Meds:  Current Facility-Administered Medications:  amiodarone 0 5 mg/min Intravenous Continuous Santy Mcfadden PA-C Last Rate: 0 5 mg/min (05/19/18 1127)   aspirin 325 mg Oral Daily Santy Mcfadden PA-C    budesonide 0 5 mg Nebulization Q12H Charissa Rao PA-C    chlorhexidine 15 mL Swish & Spit Q12H [address] Santy Mcfadden PA-C    diltiazem 1-15 mg/hr Intravenous Titrated Charissa Rao PA-C Last Rate: Stopped (05/19/18 1445)   heparin (porcine) 5,000 Units Subcutaneous [facility] AMALIA Clifton    insulin lispro 5-25 Units Subcutaneous Q6H [address] Santy Mcfadden PA-C    ipratropium 0 5 mg Nebulization Q6H Charissa Rao PA-C    lactulose 20 g Oral Daily Charissa Rao PA-C    levalbuterol 1 25 mg Nebulization Q6H Charissa Rao PA-C    LORazepam 0 5 mg Intravenous Q2H PRN AMALIA Coyle    Or        LORazepam 1 mg Intravenous Q2H PRN AMALIA Coyle    Or        LORazepam 2 mg Intravenous Q2H PRN AMALIA Coyle    methylPREDNISolone sodium succinate 40 mg Intravenous [facility] AMALIA Coyle    metoprolol 5 mg Intravenous Q6H PRN Surekha Cochran PA-C    metoprolol tartrate 25 mg Oral Q12H [address] Surekha Cochran PA-C    nicotine 14 mg Transdermal Daily AMALIA Coyle    oxazepam 15 mg Oral Q6H [address] Imelda Hua MD    propofol 5-50 mcg/kg/min Intravenous Titrated Surekha Cochran PA-C Last Rate: 50 mcg/kg/min (05/20/18 0559)     Continuous Infusions:  amiodarone 0 5 mg/min Last Rate: 0 5 mg/min (05/19/18 1127)   diltiazem 1-15 mg/hr Last Rate: Stopped (05/19/18 1445)   propofol 5-50 mcg/kg/min Last Rate: 50 mcg/kg/min (05/20/18 0559)     PRN Meds:    LORazepam 0 5 mg Q2H PRN   Or     LORazepam 1 mg Q2H PRN   Or     LORazepam 2 mg Q2H PRN   metoprolol 5 mg Q6H PRN     VTE Pharmacologic Prophylaxis: Heparin  VTE Mechanical Prophylaxis: sequential compression device  Invasive lines and devices:   Invasive Devices     Peripheral Intravenous Line            Peripheral IV 05/18/18 Right;Upper Arm 1 day    Peripheral IV 05/18/18 Right;Upper Arm 1 day    Peripheral IV 05/19/18 Left Arm less than 1 day          Drain            NG/OG/Enteral Tube Orogastric 16 Fr Right mouth less than 1 day    Urethral Catheter Temperature probe less than 1 day          Airway            ETT  Hi-Lo 8 mm less than 1 day                     Megan Roach DO

## 2018-05-20 NOTE — RESPIRATORY THERAPY NOTE
RT Ventilator Management Note  Christiana Navarro 56 y o  male MRN: [id number]  Unit/Bed#: [facility] 06 Encounter: [id number]      Daily Screen     No data found              Physical Exam:          Resp Comments: (P) no vent changes, pt appears comfortable on current settings will continue to monitor

## 2018-05-20 NOTE — PLAN OF CARE
Problem: Potential for Falls  Goal: Patient will remain free of falls  INTERVENTIONS:  - Assess patient frequently for physical needs  -  Identify cognitive and physical deficits and behaviors that affect risk of falls    -  [place] fall precautions as indicated by assessment   - Educate patient/family on patient safety including physical limitations  - Instruct patient to call for assistance with activity based on assessment  - Modify environment to reduce risk of injury  - Consider OT/PT consult to assist with strengthening/mobility   Outcome: Progressing      Problem: SAFETY,RESTRAINT: NV/NON-SELF DESTRUCTIVE BEHAVIOR  Goal: Remains free of harm/injury (restraint for non violent/non self-detsructive behavior)  INTERVENTIONS:  - Instruct patient/family regarding restraint use   - Assess and monitor physiologic and psychological status   - Provide interventions and comfort measures to meet assessed patient needs   - Identify and implement measures to help patient regain control  - Assess readiness for release of restraint   Outcome: Progressing

## 2018-05-21 ENCOUNTER — APPOINTMENT (INPATIENT)
Dept: RADIOLOGY | Facility: HOSPITAL | Age: 58
DRG: 710 | End: 2018-05-21
Payer: COMMERCIAL

## 2018-05-21 PROBLEM — I42.9 HEART FAILURE, SYSTOLIC, DUE TO IDIOPATHIC CARDIOMYOPATHY (HCC): Status: ACTIVE | Noted: 2018-05-21

## 2018-05-21 PROBLEM — I50.20 HEART FAILURE, SYSTOLIC, DUE TO IDIOPATHIC CARDIOMYOPATHY (HCC): Status: ACTIVE | Noted: 2018-05-21

## 2018-05-21 LAB
ALBUMIN SERPL BCP-MCNC: 2.5 G/DL (ref 3.5–5)
ALP SERPL-CCNC: 131 U/L (ref 46–116)
ALT SERPL W P-5'-P-CCNC: 55 U/L (ref 12–78)
ANION GAP SERPL CALCULATED.3IONS-SCNC: 4 MMOL/L (ref 4–13)
AST SERPL W P-5'-P-CCNC: 50 U/L (ref 5–45)
BASOPHILS # BLD AUTO: 0.02 THOUSANDS/ΜL (ref 0–0.1)
BASOPHILS NFR BLD AUTO: 0 % (ref 0–1)
BILIRUB SERPL-MCNC: 0.68 MG/DL (ref 0.2–1)
BUN SERPL-MCNC: 14 MG/DL (ref 5–25)
CALCIUM SERPL-MCNC: 7.4 MG/DL (ref 8.3–10.1)
CHLORIDE SERPL-SCNC: 105 MMOL/L (ref 100–108)
CO2 SERPL-SCNC: 35 MMOL/L (ref 21–32)
CREAT SERPL-MCNC: 0.86 MG/DL (ref 0.6–1.3)
EOSINOPHIL # BLD AUTO: 0 THOUSAND/ΜL (ref 0–0.61)
EOSINOPHIL NFR BLD AUTO: 0 % (ref 0–6)
ERYTHROCYTE [DISTWIDTH] IN BLOOD BY AUTOMATED COUNT: 17.6 % (ref 11.6–15.1)
GFR SERPL CREATININE-BSD FRML MDRD: 98 ML/MIN/1.73SQ M
GLUCOSE SERPL-MCNC: 124 MG/DL (ref 65–140)
GLUCOSE SERPL-MCNC: 124 MG/DL (ref 65–140)
GLUCOSE SERPL-MCNC: 145 MG/DL (ref 65–140)
GLUCOSE SERPL-MCNC: 200 MG/DL (ref 65–140)
GLUCOSE SERPL-MCNC: 228 MG/DL (ref 65–140)
HCT VFR BLD AUTO: 33.9 % (ref 36.5–49.3)
HGB BLD-MCNC: 11 G/DL (ref 12–17)
INR PPP: 1.09 (ref 0.86–1.17)
LYMPHOCYTES # BLD AUTO: 0.33 THOUSANDS/ΜL (ref 0.6–4.47)
LYMPHOCYTES NFR BLD AUTO: 5 % (ref 14–44)
MAGNESIUM SERPL-MCNC: 2 MG/DL (ref 1.6–2.6)
MCH RBC QN AUTO: 32.6 PG (ref 26.8–34.3)
MCHC RBC AUTO-ENTMCNC: 32.4 G/DL (ref 31.4–37.4)
MCV RBC AUTO: 101 FL (ref 82–98)
MONOCYTES # BLD AUTO: 1.39 THOUSAND/ΜL (ref 0.17–1.22)
MONOCYTES NFR BLD AUTO: 20 % (ref 4–12)
NEUTROPHILS # BLD AUTO: 5.04 THOUSANDS/ΜL (ref 1.85–7.62)
NEUTS SEG NFR BLD AUTO: 71 % (ref 43–75)
NRBC BLD AUTO-RTO: 2 /100 WBCS
PHOSPHATE SERPL-MCNC: 3 MG/DL (ref 2.7–4.5)
PLATELET # BLD AUTO: 86 THOUSANDS/UL (ref 149–390)
PMV BLD AUTO: 10.2 FL (ref 8.9–12.7)
POTASSIUM SERPL-SCNC: 3.9 MMOL/L (ref 3.5–5.3)
PROT SERPL-MCNC: 5 G/DL (ref 6.4–8.2)
PROTHROMBIN TIME: 14.2 SECONDS (ref 11.8–14.2)
RBC # BLD AUTO: 3.37 MILLION/UL (ref 3.88–5.62)
SODIUM SERPL-SCNC: 144 MMOL/L (ref 136–145)
WBC # BLD AUTO: 7.06 THOUSAND/UL (ref 4.31–10.16)

## 2018-05-21 PROCEDURE — 83735 ASSAY OF MAGNESIUM: CPT | Performed by: PHYSICIAN ASSISTANT

## 2018-05-21 PROCEDURE — 71045 X-RAY EXAM CHEST 1 VIEW: CPT

## 2018-05-21 PROCEDURE — 84100 ASSAY OF PHOSPHORUS: CPT | Performed by: PHYSICIAN ASSISTANT

## 2018-05-21 PROCEDURE — 99232 SBSQ HOSP IP/OBS MODERATE 35: CPT | Performed by: INTERNAL MEDICINE

## 2018-05-21 PROCEDURE — 94003 VENT MGMT INPAT SUBQ DAY: CPT

## 2018-05-21 PROCEDURE — 82948 REAGENT STRIP/BLOOD GLUCOSE: CPT

## 2018-05-21 PROCEDURE — 80053 COMPREHEN METABOLIC PANEL: CPT | Performed by: PHYSICIAN ASSISTANT

## 2018-05-21 PROCEDURE — C9113 INJ PANTOPRAZOLE SODIUM, VIA: HCPCS | Performed by: FAMILY MEDICINE

## 2018-05-21 PROCEDURE — 99291 CRITICAL CARE FIRST HOUR: CPT | Performed by: INTERNAL MEDICINE

## 2018-05-21 PROCEDURE — 94760 N-INVAS EAR/PLS OXIMETRY 1: CPT

## 2018-05-21 PROCEDURE — 85025 COMPLETE CBC W/AUTO DIFF WBC: CPT | Performed by: PHYSICIAN ASSISTANT

## 2018-05-21 PROCEDURE — 94640 AIRWAY INHALATION TREATMENT: CPT

## 2018-05-21 PROCEDURE — 85610 PROTHROMBIN TIME: CPT | Performed by: PHYSICIAN ASSISTANT

## 2018-05-21 RX ORDER — LORAZEPAM 2 MG/ML
1 INJECTION INTRAMUSCULAR EVERY 2 HOUR PRN
Status: DISCONTINUED | OUTPATIENT
Start: 2018-05-21 | End: 2018-05-29

## 2018-05-21 RX ORDER — FAMOTIDINE 20 MG/1
20 TABLET, FILM COATED ORAL
Status: DISCONTINUED | OUTPATIENT
Start: 2018-05-21 | End: 2018-05-22

## 2018-05-21 RX ORDER — FUROSEMIDE 10 MG/ML
40 INJECTION INTRAMUSCULAR; INTRAVENOUS ONCE
Status: COMPLETED | OUTPATIENT
Start: 2018-05-21 | End: 2018-05-21

## 2018-05-21 RX ORDER — LORAZEPAM 2 MG/ML
0.5 INJECTION INTRAMUSCULAR EVERY 2 HOUR PRN
Status: DISCONTINUED | OUTPATIENT
Start: 2018-05-21 | End: 2018-05-29

## 2018-05-21 RX ORDER — ALBUMIN, HUMAN INJ 5% 5 %
12.5 SOLUTION INTRAVENOUS ONCE
Status: COMPLETED | OUTPATIENT
Start: 2018-05-21 | End: 2018-05-21

## 2018-05-21 RX ORDER — LORAZEPAM 2 MG/ML
2 INJECTION INTRAMUSCULAR EVERY 2 HOUR PRN
Status: DISCONTINUED | OUTPATIENT
Start: 2018-05-21 | End: 2018-05-29

## 2018-05-21 RX ORDER — POTASSIUM CHLORIDE 14.9 MG/ML
20 INJECTION INTRAVENOUS ONCE
Status: COMPLETED | OUTPATIENT
Start: 2018-05-21 | End: 2018-05-21

## 2018-05-21 RX ORDER — POLYETHYLENE GLYCOL 3350 17 G/17G
17 POWDER, FOR SOLUTION ORAL DAILY
Status: DISCONTINUED | OUTPATIENT
Start: 2018-05-21 | End: 2018-05-22

## 2018-05-21 RX ORDER — FAMOTIDINE 40 MG/5ML
20 POWDER, FOR SUSPENSION ORAL 2 TIMES DAILY
Status: DISCONTINUED | OUTPATIENT
Start: 2018-05-21 | End: 2018-05-21

## 2018-05-21 RX ORDER — SENNOSIDES 8.8 MG/5ML
8.8 LIQUID ORAL
Status: DISCONTINUED | OUTPATIENT
Start: 2018-05-21 | End: 2018-05-22

## 2018-05-21 RX ORDER — BISACODYL 10 MG
10 SUPPOSITORY, RECTAL RECTAL DAILY PRN
Status: DISCONTINUED | OUTPATIENT
Start: 2018-05-21 | End: 2018-05-22

## 2018-05-21 RX ORDER — PANTOPRAZOLE SODIUM 40 MG/1
40 INJECTION, POWDER, FOR SOLUTION INTRAVENOUS
Status: DISCONTINUED | OUTPATIENT
Start: 2018-05-21 | End: 2018-05-21

## 2018-05-21 RX ADMIN — LEVALBUTEROL HYDROCHLORIDE 1.25 MG: 1.25 SOLUTION, CONCENTRATE RESPIRATORY (INHALATION) at 19:43

## 2018-05-21 RX ADMIN — FAMOTIDINE 20 MG: 20 TABLET ORAL at 16:13

## 2018-05-21 RX ADMIN — OXAZEPAM 15 MG: 15 CAPSULE, GELATIN COATED ORAL at 18:01

## 2018-05-21 RX ADMIN — IPRATROPIUM BROMIDE 0.5 MG: 0.5 SOLUTION RESPIRATORY (INHALATION) at 00:36

## 2018-05-21 RX ADMIN — LACTULOSE 20 G: 20 SOLUTION ORAL at 08:06

## 2018-05-21 RX ADMIN — ATORVASTATIN CALCIUM 40 MG: 40 TABLET, FILM COATED ORAL at 16:13

## 2018-05-21 RX ADMIN — FOLIC ACID 1 MG: 5 INJECTION, SOLUTION INTRAMUSCULAR; INTRAVENOUS; SUBCUTANEOUS at 09:03

## 2018-05-21 RX ADMIN — NICOTINE 14 MG: 14 PATCH, EXTENDED RELEASE TRANSDERMAL at 08:23

## 2018-05-21 RX ADMIN — INSULIN LISPRO 10 UNITS: 100 INJECTION, SOLUTION INTRAVENOUS; SUBCUTANEOUS at 23:06

## 2018-05-21 RX ADMIN — PROPOFOL 20 MCG/KG/MIN: 10 INJECTION, EMULSION INTRAVENOUS at 06:10

## 2018-05-21 RX ADMIN — OXAZEPAM 15 MG: 15 CAPSULE, GELATIN COATED ORAL at 05:50

## 2018-05-21 RX ADMIN — OXAZEPAM 15 MG: 15 CAPSULE, GELATIN COATED ORAL at 12:53

## 2018-05-21 RX ADMIN — INSULIN LISPRO 10 UNITS: 100 INJECTION, SOLUTION INTRAVENOUS; SUBCUTANEOUS at 00:23

## 2018-05-21 RX ADMIN — LEVALBUTEROL HYDROCHLORIDE 1.25 MG: 1.25 SOLUTION, CONCENTRATE RESPIRATORY (INHALATION) at 07:37

## 2018-05-21 RX ADMIN — DIBASIC SODIUM PHOSPHATE, MONOBASIC POTASSIUM PHOSPHATE AND MONOBASIC SODIUM PHOSPHATE 2 TABLET: 852; 155; 130 TABLET ORAL at 08:05

## 2018-05-21 RX ADMIN — FUROSEMIDE 40 MG: 10 INJECTION, SOLUTION INTRAMUSCULAR; INTRAVENOUS at 10:49

## 2018-05-21 RX ADMIN — METOPROLOL TARTRATE 25 MG: 25 TABLET ORAL at 08:05

## 2018-05-21 RX ADMIN — INSULIN LISPRO 10 UNITS: 100 INJECTION, SOLUTION INTRAVENOUS; SUBCUTANEOUS at 12:53

## 2018-05-21 RX ADMIN — ASPIRIN 81 MG 81 MG: 81 TABLET ORAL at 08:05

## 2018-05-21 RX ADMIN — LEVALBUTEROL HYDROCHLORIDE 1.25 MG: 1.25 SOLUTION, CONCENTRATE RESPIRATORY (INHALATION) at 00:36

## 2018-05-21 RX ADMIN — PANTOPRAZOLE SODIUM 40 MG: 40 INJECTION, POWDER, FOR SOLUTION INTRAVENOUS at 08:16

## 2018-05-21 RX ADMIN — IPRATROPIUM BROMIDE 0.5 MG: 0.5 SOLUTION RESPIRATORY (INHALATION) at 19:43

## 2018-05-21 RX ADMIN — IPRATROPIUM BROMIDE 0.5 MG: 0.5 SOLUTION RESPIRATORY (INHALATION) at 14:28

## 2018-05-21 RX ADMIN — OXAZEPAM 15 MG: 15 CAPSULE, GELATIN COATED ORAL at 00:15

## 2018-05-21 RX ADMIN — METHYLPREDNISOLONE SODIUM SUCCINATE 40 MG: 40 INJECTION, POWDER, FOR SOLUTION INTRAMUSCULAR; INTRAVENOUS at 13:49

## 2018-05-21 RX ADMIN — LEVALBUTEROL HYDROCHLORIDE 1.25 MG: 1.25 SOLUTION, CONCENTRATE RESPIRATORY (INHALATION) at 14:27

## 2018-05-21 RX ADMIN — ALBUMIN HUMAN 12.5 G: 0.05 INJECTION, SOLUTION INTRAVENOUS at 02:48

## 2018-05-21 RX ADMIN — SODIUM PHOSPHATE, MONOBASIC, MONOHYDRATE 12 MMOL: 276; 142 INJECTION, SOLUTION INTRAVENOUS at 00:15

## 2018-05-21 RX ADMIN — POTASSIUM CHLORIDE 20 MEQ: 200 INJECTION, SOLUTION INTRAVENOUS at 08:25

## 2018-05-21 RX ADMIN — DIBASIC SODIUM PHOSPHATE, MONOBASIC POTASSIUM PHOSPHATE AND MONOBASIC SODIUM PHOSPHATE 2 TABLET: 852; 155; 130 TABLET ORAL at 12:53

## 2018-05-21 RX ADMIN — THIAMINE HYDROCHLORIDE 100 MG: 100 INJECTION, SOLUTION INTRAMUSCULAR; INTRAVENOUS at 08:02

## 2018-05-21 RX ADMIN — APIXABAN 5 MG: 5 TABLET, FILM COATED ORAL at 18:01

## 2018-05-21 RX ADMIN — FENTANYL CITRATE 75 MCG/HR: 50 INJECTION, SOLUTION INTRAMUSCULAR; INTRAVENOUS at 06:10

## 2018-05-21 RX ADMIN — METHYLPREDNISOLONE SODIUM SUCCINATE 40 MG: 40 INJECTION, POWDER, FOR SOLUTION INTRAMUSCULAR; INTRAVENOUS at 21:08

## 2018-05-21 RX ADMIN — CHLORHEXIDINE GLUCONATE 15 ML: 1.2 RINSE ORAL at 08:28

## 2018-05-21 RX ADMIN — AMIODARONE HYDROCHLORIDE 0.5 MG/MIN: 50 INJECTION, SOLUTION INTRAVENOUS at 17:23

## 2018-05-21 RX ADMIN — METOPROLOL TARTRATE 25 MG: 25 TABLET ORAL at 21:08

## 2018-05-21 RX ADMIN — METHYLPREDNISOLONE SODIUM SUCCINATE 40 MG: 40 INJECTION, POWDER, FOR SOLUTION INTRAMUSCULAR; INTRAVENOUS at 05:54

## 2018-05-21 RX ADMIN — BUDESONIDE 0.5 MG: 0.5 INHALANT RESPIRATORY (INHALATION) at 07:37

## 2018-05-21 RX ADMIN — IPRATROPIUM BROMIDE 0.5 MG: 0.5 SOLUTION RESPIRATORY (INHALATION) at 07:36

## 2018-05-21 RX ADMIN — APIXABAN 5 MG: 5 TABLET, FILM COATED ORAL at 08:05

## 2018-05-21 RX ADMIN — BUDESONIDE 0.5 MG: 0.5 INHALANT RESPIRATORY (INHALATION) at 19:43

## 2018-05-21 RX ADMIN — SENNOSIDES A AND B 8.8 MG: 415.36 LIQUID ORAL at 21:11

## 2018-05-21 RX ADMIN — POLYETHYLENE GLYCOL 3350 17 G: 17 POWDER, FOR SOLUTION ORAL at 15:25

## 2018-05-21 RX ADMIN — CALCIUM GLUCONATE 2 G: 98 INJECTION, SOLUTION INTRAVENOUS at 00:15

## 2018-05-21 NOTE — PROGRESS NOTES
Progress Note - Critical Care   Kusum Crooks 56 y o  male MRN: [id number]  Unit/Bed#: MICU 06 Encounter: [id number]          ______________________________________________________________________  Assessment and Plan:   Patient Active Problem List   Diagnosis    Colitis    Spinal stenosis of cervical region    Hypertension    Pericarditis    Coronary artery disease involving native coronary artery of native heart    Leukocytosis    Asthma    Atherosclerosis of coronary artery    COPD with exacerbation ([address]    Dyslipidemia    [facility]    Lactic acidosis    High anion gap metabolic acidosis    Acute kidney failure (HCC)    Transaminitis    Elevated troponin    Hypokalemia    Hyponatremia    Alcohol abuse    Tobacco abuse    Alcohol dependence with withdrawal (HCC)    Acute [facility] failure with hypoxia ([facility])    Bronchospasm, acute    Agitation         Neuro:   - Alert and following commands on Propofol 25, Fentanyl 75  Required no PRN overnight for agitation  Continue Serax 15 mg daily for acute alcohol withdrawal, CIWA protocol  Continue Thiamine and Folate supplementation  CV:   - Afib with RVR- Digoxin load is completed, level 1 6  Per cardiology recommendations, continue Amiodarone drip 0 5 mg, metoprolol 25 mg q12  Repeat ECHO when heart rate is controlled  Continue Eliquis 5 mg BID  Consider dose of Lasix today given lung exam and increased opacity on chest XR   - History of CAD with inferior MR s/p RCA stent 2012- Aspirin and Atorvastatin restarted  Pulm:   - Acute hypoxic respiratory failure in the setting of acute COPD exacerbation- AC/VC 14/450/40%/5, wean as tolerated  Continue Solumedrol 40 mg q8 with Xopenex and Atrovent q6, Pulmicort BID  Consider one time dose of Lasix this morning as above  Given Lipase of >700 and elevated triglycerides, goal is to continue to decrease propofol        GI:   - Elevated ammonia- Continue Lactulose  - GI prophylaxis- Protonix 40 mg daily   - Bowel regimen- Consider adding bowel regimen today in addition to lactulose     :   - Urine output decreased overnight, s/p 12 5 mg albumin, improved  Will continue to monitor Is and Os closely  F/E/N:   - Jevity 1 2 at rate 50  - Replete electrolytes Mg >2 5, K >4 5    ID:   - Afebrile with no leukocytosis, continue to monitor off antibiotics     Heme:   - Thrombocytopenia- Platelets 86 this morning, slowly trending up  Will continue to monitor    - DVT prophylaxis- Heparin     Endo:   - Continue SSI with goal glucose <180, glucose consistently above goal  Consider checking A1c and starting a low dose of Lantus  Msk/Skin:   - Frequent repositioning, PT/OT when appropriate      Disposition: Continue ICU level care     Code Status: Level 1 - Full Code    ______________________________________________________________________    Chief Complaint: None    24 Hour Events: Urine output decreased to 20 cc overnight, 12 5 mg Albumin given, which then improved to 40-60 cc per hour  No PRN medications required overnight  Fentanyl increased and propofol decreased overnight        ______________________________________________________________________    Physical Exam:       Physical Exam   Constitutional: He appears well-developed and well-nourished  No distress  HENT:   Head: Normocephalic and atraumatic  Neck: Normal range of motion  Neck supple  Cardiovascular: Normal rate, regular rhythm, normal heart sounds and intact distal pulses  No murmur heard  Pulmonary/Chest: No respiratory distress  He has wheezes  Coarse breath sounds bilaterally, rhonchi appreciated more on left than right, end-expiratory wheeze  Diminished lung sounds at bases bilaterally  Abdominal: Bowel sounds are normal  He exhibits distension  There is no tenderness  There is no rebound and no guarding  Distended but soft abdomen, no rebound or guarding    Musculoskeletal: He exhibits no edema     Neurological: He is alert  GCS 11T, intubated but opens eyes spontaneously to verbal stimuli, follows commands appropriately    Skin: Skin is warm  No rash noted  He is not diaphoretic  No pallor  Vitals reviewed  ______________________________________________________________________  Vitals:    18 9609 18 0144 18 0244 18 0344   BP: 110/72 114/70 93/59 124/77   Pulse: 82 94 94 (!) 106   Resp: 14 13 (!) 10 19   Temp: 99 7 °F (37 6 °C) 99 7 °F (37 6 °C) 99 7 °F (37 6 °C) 99 7 °F (37 6 °C)   TempSrc:       SpO2: 94% 94% 94% 94%   Weight:       Height:                  Temperature:   Temp (24hrs), Av 5 °F (37 5 °C), Min:98 6 °F (37 °C), Max:100 4 °F (38 °C)    Current Temperature: 99 7 °F (37 6 °C)  Weights:   IBW: 63 8 kg    Body mass index is 30 42 kg/m²    Weight (last 2 days)     None        Hemodynamic Monitoring:  N/A     Non-Invasive/Invasive Ventilation Settings:  Respiratory    Lab Data (Last 4 hours)    None         O2/Vent Data (Last 4 hours)       0356           Vent Mode AC/VC       Resp Rate (BPM) (BPM) 14       Vt (mL) (mL) 450       FIO2 (%) (%) 40       PEEP (cmH2O) (cmH2O) 5       MV 6 8                 No results found for: PHART, ZAW8XEU, PO2ART, FSW3OQX, P6XDIDHA, BEART, SOURCE  SpO2: SpO2: 94 %, SpO2 Activity: SpO2 Activity: At Rest, SpO2 Device: O2 Device: Other (comment) (vent), Capnography:    Intake and Outputs:  I/O        07 -  0700  07 -  0700    I V  (mL/kg) 2267 9 (26 5) 852 6 (10)    NG/GT 40 395    IV Piggyback 450 900    Feedings 80 931    Total Intake(mL/kg) 2837 9 (33 2) 3078 6 (36)    Urine (mL/kg/hr) 1150 (0 6) 1740 (0 8)    Emesis/NG output 0 (0) 0 (0)    Other 3 (0)     Total Output 1153 1740    Net +1684 9 +1338 6              UOP: 30/hour   Nutrition:        Diet Orders            Start     Ordered    18 1658  Diet Enteral/Parenteral; Tube Feeding No Oral Diet; Jevity 1 2 Feliciano; 50  Diet effective now     Comments:  Begin at 10ml and increase by 10ml q4 h until goal met   Question Answer Comment   Diet Type Enteral/Parenteral    Enteral/Parenteral Tube Feeding No Oral Diet    Tube Feeding Formula: Jevity 1 2 Feliciano    Tube Feeding Continuous rate (mL/hr): 50    RD to adjust diet per protocol? Yes        05/19/18 1659        TF currently running at 50/hour with a goal of 50  Formula:Jevity 1 2   Labs:     Results from last 7 days  Lab Units 05/21/18 0425 05/20/18  0427 05/19/18  0608 05/18/18  1909   WBC Thousand/uL 7 06 10 15  --  15 07*   HEMOGLOBIN g/dL 11 0* 10 9*  --  12 5   I STAT HEMOGLOBIN g/dl  --   --  10 2*  --    HEMATOCRIT % 33 9* 32 8*  --  37 2   PLATELETS Thousands/uL 86* 63*  --  76*   NEUTROS PCT % 71 83*  --  87*   MONOS PCT % 20* 10  --  7       Results from last 7 days  Lab Units 05/21/18  0425 05/20/18  2209 05/20/18  0427  05/19/18  0439  05/18/18  0457   SODIUM mmol/L 144 142 143  --  142  < > 135*   POTASSIUM mmol/L 3 9 4 1 3 9  < > 3 6  < > 3 7   CHLORIDE mmol/L 105 104 106  --  104  < > 100   CO2 mmol/L 35* 35* 33*  --  32  < > 27   BUN mg/dL 14 12 10  --  11  < > 16   CREATININE mg/dL 0 86 0 87 0 66  --  0 77  < > 0 84   CALCIUM mg/dL 7 4* 7 1* 6 8*  --  6 7*  < > 6 3*   TOTAL PROTEIN g/dL 5 0*  --   --   --  5 7*  --  5 2*   BILIRUBIN TOTAL mg/dL 0 68  --   --   --  0 70  --  0 46   ALK PHOS U/L 131*  --   --   --  170*  --  152*   ALT U/L 55  --   --   --  62  --  46   AST U/L 50*  --   --   --  100*  --  63*   GLUCOSE RANDOM mg/dL 124 211* 178*  --  182*  < > 202*   GLUCOSE, ISTAT   --   --   --   < >  --   < >  --    < > = values in this interval not displayed      Results from last 7 days  Lab Units 05/21/18  0425 05/20/18  2209 05/20/18  1214   MAGNESIUM mg/dL 2 0 2 3 2 1     Lab Results   Component Value Date    PHOS 3 0 05/21/2018    PHOS 2 7 05/20/2018    PHOS 0 7 (L) 05/20/2018        Results from last 7 days  Lab Units 05/21/18  0425 05/15/18  1305   INR  1 09 1 05   PTT seconds  --  27       0  Lab Value Date/Time   TROPONINI 0 12 (H) 05/20/2018 1753   TROPONINI 0 17 (H) 05/20/2018 1505   TROPONINI 0 17 (H) 05/20/2018 1214   TROPONINI 0 29 (H) 05/19/2018 0438   TROPONINI 0 30 (H) 05/19/2018 0154   TROPONINI 0 32 (H) 05/18/2018 2229   TROPONINI 0 32 (H) 05/18/2018 1909   TROPONINI 1 15 (H) 05/16/2018 1416   TROPONINI 1 24 (H) 05/16/2018 0522   TROPONINI 1 16 (H) 05/16/2018 0209   TROPONINI 1 09 (H) 05/16/2018 0053   TROPONINI 0 85 (H) 05/15/2018 1935   TROPONINI 0 72 (H) 05/15/2018 1728   TROPONINI 0 47 (H) 05/15/2018 1304   TROPONINI 0 02 12/07/2016 1931   TROPONINI 0 04 (H) 12/07/2016 0907   TROPONINI <0 04 05/22/2015 0123       Results from last 7 days  Lab Units 05/16/18  0722 05/16/18  0413 05/16/18  0209   LACTIC ACID mmol/L 1 6 2 8* 3 8*     ABG:  Lab Results   Component Value Date    PHART 7 385 05/19/2018    MOV3DHM 49 5 (H) 05/19/2018    PO2ART 284 5 (H) 05/19/2018    BYH0WCN 29 0 (H) 05/19/2018    BEART 3 2 05/19/2018    SOURCE Radial, Right 05/19/2018     Imaging:  I have personally reviewed pertinent reports  Chest XR- Final read pending, likely worsening right infrahilar opacity   EKG: Normal sinus rhythm, anteroseptal infarct, marked ST abnormality indicating possible inferior subendocardial injury  Micro:  Lab Results   Component Value Date    BLOODCX No Growth After 5 Days  05/15/2018    BLOODCX No Growth After 5 Days  05/15/2018     Allergies:    Allergies   Allergen Reactions    Other      Brown cloth band aids        Medications:   Scheduled Meds:  Current Facility-Administered Medications:  amiodarone 0 5 mg/min Intravenous Continuous Heath Finn PA-C Last Rate: 0 5 mg/min (05/20/18 1838)   apixaban 5 mg Oral BID Kelley Pond MD    aspirin 81 mg Oral Daily Kelley Pond MD    atorvastatin 40 mg Oral Daily With Pattie Cason MD    budesonide 0 5 mg Nebulization Q12H Boaz Godwin PA-C    chlorhexidine 15 mL [facility] Heath Finn PA-C    fentaNYL 75 mcg/hr Intravenous Continuous Shayla Celaya MD Last Rate: 75 mcg/hr (23/28/42 5219)   folic acid IVPB 1 mg Intravenous Daily Lionell Canavan, PA-C Last Rate: Stopped (05/20/18 1428)   insulin lispro 5-25 Units Subcutaneous Q6H [facility]    ipratropium 0 5 mg Nebulization Q6H Jerel Garcia PA-C    lactulose 20 g Oral Daily Jerel Garcia PA-C    levalbuterol 1 25 mg Nebulization Q6H Jerel Garcia PA-C    LORazepam 0 5 mg Intravenous Q2H PRN ABDOUL ValenzuelaNP    Or        LORazepam 1 mg Intravenous Q2H PRN Vickia Carolyn CRNP    Or        LORazepam 2 mg Intravenous Q2H PRN ABDOUL ValenzuelaNP    methylPREDNISolone sodium succinate 40 mg Intravenous [facility] AMALIA Valenzuela    metoprolol 5 mg Intravenous Q6H PRN Maciemary anne Ray PA-C    metoprolol tartrate 25 mg Oral Q12H [facility] EBONY Ray    nicotine 14 mg Transdermal Daily AMALIA Valenzuela    oxazepam 15 mg Oral Q6H [facility] Justin Mckeon MD    potassium-sodium phosphateS 2 tablet Oral 4x Daily (with meals and at bedtime) Whitley Hush,     propofol 5-50 mcg/kg/min Intravenous Titrated Lionell Canavan, PA-C Last Rate: 25 mcg/kg/min (05/20/18 2317)   thiamine 100 mg Intravenous Daily Lionell Canavan, PA-C Last Rate: Stopped (05/20/18 1428)     Continuous Infusions:  amiodarone 0 5 mg/min Last Rate: 0 5 mg/min (05/20/18 1838)   fentaNYL 75 mcg/hr Last Rate: 75 mcg/hr (05/21/18 0015)   propofol 5-50 mcg/kg/min Last Rate: 25 mcg/kg/min (05/20/18 2317)     PRN Meds:    LORazepam 0 5 mg Q2H PRN   Or     LORazepam 1 mg Q2H PRN   Or     LORazepam 2 mg Q2H PRN   metoprolol 5 mg Q6H PRN     VTE Pharmacologic Prophylaxis: Heparin  VTE Mechanical Prophylaxis: sequential compression device  Invasive lines and devices:   Invasive Devices     Peripheral Intravenous Line            Peripheral IV 05/18/18 Right;Upper Arm 2 days    Peripheral IV 05/18/18 Right;Upper Arm 2 days    Peripheral IV 05/19/18 Left Arm 1 day Peripheral IV 05/20/18 Right Hand less than 1 day          Drain            NG/OG/Enteral Tube Orogastric 16 Fr Right mouth 1 day    Urethral Catheter Temperature probe 1 day          Airway            ETT  Hi-Lo 8 mm 1 day                     Lorena Reza DO

## 2018-05-21 NOTE — RESPIRATORY THERAPY NOTE
RT Ventilator Management Note  Regino Oats 56 y o  male MRN: [id number]  Unit/Bed#: [facility] 06 Encounter: [id number]      Daily Screen     No data found              Physical Exam:   Assessment Type: Assess only  General Appearance: Sedated  Respiratory Pattern: Symmetrical, Assisted  Chest Assessment: Chest expansion symmetrical  Bilateral Breath Sounds: Diminished, Coarse  Suction: ET Tube  O2 Device: vent      Resp Comments: No changes made to the vent overnight, pt is tollerating current settings, will cont to monitor pt overnight

## 2018-05-21 NOTE — PROGRESS NOTES
Cardiology Progress Note - Mervin Greenberg 56 y o  male MRN: [id number]    [facility] En[facility]: [id number]      Assessment:  Principal Problem:    Acute respiratory failure with hypoxia [facility])  Active Problems:    Colitis    Coronary artery disease involving native coronary artery of native heart    COPD with exacerbation (HCC)    Shock ([facility] )    Lactic acidosis    High anion gap metabolic acidosis    Acute kidney failure (HCC)    Transaminitis    Elevated troponin    Hypokalemia    Hyponatremia    Alcohol abuse    Tobacco abuse    Alcohol dependence with withdrawal ([facility])    Bronchospasm, acute    Agitation    Assessment and plan     1  Atrial fibrillation with RVR: rates 110s on telemetry  s/p Cardizem and digoxin load  c/w amiodarone at 0 5 mg per minute  Blood pressures stable  Continue metoprolol tartrate 25 mg q 12h  Uptitrate as tolerated  Not started on any daily dose of digoxin at this time  Continue treatment for alcohol withdrawal and agitation  recent TSH was within normal limits  Replete to keep potassium more than 4 5, Mg > 2 5  [id number] score is now 2,  c/w anticoagulation with Eliquis 5 mg b i d  2  New onset cardiomyopathy - Echocardiogram on 05/19/2018 revealed a new drop in LVEF 25% with diffuse hypokinesis with regional variations, this is new compared to 2016  Could be related to tachycardia Afib versus alcoholic cardiomyopathy  On EKG he has old septal Q-waves and minimal ST depressions in the inferior leads  Mild troponin elevation related to demand ischemia  Repeat a limited echo  for function after better heart rate control and acute medical issues have resolved  Recommend a dose of 40 mg IV lasix today as appears mildly volume overloaded on exam   3 History of CAD: Inferior MI status post RCA stent in 2012  Cardiac catheterization in 2016 for inferior ST elevations revealed patent RCA stent  Was treated for pericarditis with colchicine and aspirin at this time    c/w aspirin, atorvastatin  He is noncompliant with his medications  4  Alcohol abuse:  Being treated for alcohol withdrawal with DTs , intubated and is on propofol  5  Hypertension:  Pressure at goal on metoprolol 25 q  12 hours  6  Hyperlipidemia:On atorvastatin 40 HS  7  History of Crohn's disease  8  Mild aortic stenosis    Subjective:   Patient seen and examined bedside  Awake, intubated    Telemetry:  Atrial fibrillation in the rate of 110s   Objective:     Vitals: Blood pressure 109/73, pulse (!) 114, temperature (!) 100 8 °F (38 2 °C), resp  rate (!) 10, height 5' 6" (1 676 m), weight 86 6 kg (190 lb 14 7 oz), SpO2 95 %  , Body mass index is 30 81 kg/m² ,   Orthostatic Blood Pressures      Most Recent Value   Blood Pressure  109/73 filed at 05/21/2018 2599   Patient Position - Orthostatic VS  Lying filed at 05/21/2018 0750            Intake/Output Summary (Last 24 hours) at 05/21/18 0934  Last data filed at 05/21/18 0753   Gross per 24 hour   Intake          3201 68 ml   Output             1685 ml   Net          1516 68 ml     Physical Exam:    GEN: Intubated, Awake  HEENT: anicteric, mucous membranes moist  NECK:  Unable to appreciate JVP  HEART: tachy, irregularly irregular, no murmur heard  LUNGS:  Decreased breath sound at lung bases  ABDOMEN: normal bowel sounds, soft  EXTREMITIES:  1+ edema  NEURO: sedated    Current Facility-Administered Medications:     amiodarone (CORDARONE) 900 mg in dextrose 5 % 500 mL infusion, 0 5 mg/min, Intravenous, Continuous, Louise Resendiz PA-C, Last Rate: 16 7 mL/hr at 05/20/18 1838, 0 5 mg/min at 05/20/18 1838    apixaban (ELIQUIS) tablet 5 mg, 5 mg, Oral, BID, Mik Starks MD, 5 mg at 05/21/18 0805    aspirin chewable tablet 81 mg, 81 mg, Oral, Daily, Mik Starks MD, 81 mg at 05/21/18 0805    atorvastatin (LIPITOR) tablet 40 mg, 40 mg, Oral, Daily With Tessa Ochoa MD, 40 mg at 05/20/18 1721    budesonide (PULMICORT) inhalation solution 0 5 mg, 0 5 mg, Nebulization, Q12H, Rashaun Arango PA-C, 0 5 mg at 05/21/18 0737    chlorhexidine (PERIDEX) 0 12 % oral rinse 15 mL, 15 mL, [facility], Q12H [facility] & half-way, Cindi Cuevas PA-C, 15 mL at 05/21/18 3203    fentaNYL 1250 mcg in sodium chloride 0 9% 125mL drip, 75 mcg/hr, Intravenous, Continuous, Anel Matamoros MD, Last Rate: 7 5 mL/hr at 05/21/18 0610, 75 mcg/hr at 99/96/24 4065    folic acid 1 mg in sodium chloride 0 9 % 50 mL IVPB, 1 mg, Intravenous, Daily, Tk Archuleta PA-C, Last Rate: 100 mL/hr at 05/21/18 0903, 1 mg at 05/21/18 0903    insulin lispro (HumaLOG) 100 units/mL subcutaneous injection 5-25 Units, 5-25 Units, Subcutaneous, Q6H [facility], 10 Units at 05/21/18 0023 **AND** Fingerstick Glucose (POCT), , , Q6H, Cidni Cuevas PA-C    ipratropium (ATROVENT) 0 02 % inhalation solution 0 5 mg, 0 5 mg, Nebulization, Q6H, Rashaun Arango PA-C, 0 5 mg at 05/21/18 0736    lactulose 20 g/30 mL oral solution 20 g, 20 g, Oral, Daily, Rashaun Arango PA-C, 20 g at 05/21/18 0806    levalbuterol (XOPENEX) inhalation solution 1 25 mg, 1 25 mg, Nebulization, Q6H, Rashaun Arango PA-C, 1 25 mg at 05/21/18 0737    LORazepam (ATIVAN) 2 mg/mL injection 0 5 mg, 0 5 mg, Intravenous, Q2H PRN **OR** LORazepam (ATIVAN) 2 mg/mL injection 1 mg, 1 mg, Intravenous, Q2H PRN, 1 mg at 05/16/18 0028 **OR** LORazepam (ATIVAN) 2 mg/mL injection 2 mg, 2 mg, Intravenous, Q2H PRN, AMALIA Mcdonald, 2 mg at 05/20/18 0452    methylPREDNISolone sodium succinate (Solu-MEDROL) injection 40 mg, 40 mg, Intravenous, Q8H [facility] & half-way, AMALIA Clifton, 40 mg at 05/21/18 0554    metoprolol (LOPRESSOR) injection 5 mg, 5 mg, Intravenous, Q6H PRN, Cindi Cuevas PA-C, 5 mg at 05/19/18 1152    metoprolol tartrate (LOPRESSOR) tablet 25 mg, 25 mg, Oral, Q12H [facility] & half-way, Cindi Cuevas PA-C, 25 mg at 05/21/18 0805    nicotine (NICODERM CQ) 14 mg/24hr TD 24 hr patch 14 mg, 14 mg, Transdermal, Daily, AMALIA Mcdonald, 14 mg at 05/21/18 6146    oxazepam (SERAX) capsule 15 mg, 15 mg, Oral, Q6H [address], Rosetta Hua MD, 15 mg at 05/21/18 0550    pantoprazole (PROTONIX) injection 40 mg, 40 mg, Intravenous, Q24H [address], An Eason DO, 40 mg at 05/21/18 0816    potassium chloride 20 mEq IVPB (premix), 20 mEq, Intravenous, Once, An Eason DO, Last Rate: 50 mL/hr at 05/21/18 0825, 20 mEq at 05/21/18 0825    potassium-sodium phosphateS (K-PHOS,PHOSPHA 250) -250 mg tablet 2 tablet, 2 tablet, Oral, 4x Daily (with meals and at bedtime), An Eason DO, 2 tablet at 05/21/18 0805    propofol (DIPRIVAN) 1000 mg in 100 mL infusion (premix), 5-50 mcg/kg/min, Intravenous, Titrated, Britni Canela PA-C, Stopped at 05/21/18 0748    thiamine (VITAMIN B1) 100 mg in sodium chloride 0 9 % 50 mL IVPB, 100 mg, Intravenous, Daily, Fern Newell PA-C, Last Rate: 100 mL/hr at 05/21/18 0802, 100 mg at 05/21/18 0802    Labs & Results:    Lab Results   Component Value Date    CKTOTAL 295 05/15/2018    CKMBINDEX 3 1 (H) 05/15/2018    TROPONINI 0 12 (H) 05/20/2018    TROPONINI 0 17 (H) 05/20/2018    TROPONINI 0 17 (H) 05/20/2018       Lab Results   Component Value Date    GLUCOSE 124 05/21/2018    CALCIUM 7 4 (L) 05/21/2018     05/21/2018    K 3 9 05/21/2018    CO2 35 (H) 05/21/2018     05/21/2018    BUN 14 05/21/2018    CREATININE 0 86 05/21/2018       Lab Results   Component Value Date    WBC 7 06 05/21/2018    HGB 11 0 (L) 05/21/2018    HCT 33 9 (L) 05/21/2018     (H) 05/21/2018    PLT 86 (L) 05/21/2018       Results from last 7 days  Lab Units 05/21/18  0425   INR  1 09       Lab Results   Component Value Date    CHOL 145 05/20/2018    CHOL 131 12/08/2016     Lab Results   Component Value Date    HDL 67 (H) 05/20/2018    HDL 60 12/08/2016     Lab Results   Component Value Date    LDLCALC 44 05/20/2018    LDLCALC 47 12/08/2016     Lab Results   Component Value Date    TRIG 171 (H) 05/20/2018    TRIG 120 12/08/2016       Lab Results   Component Value Date    ALT 55 05/21/2018    AST 50 (H) 05/21/2018

## 2018-05-21 NOTE — RESPIRATORY THERAPY NOTE
RT Ventilator Management Note  Mervin Greenberg 56 y o  male MRN: [id number]  Unit/Bed#: [facility] 06 Encounter: [id number]      Daily Screen       5/21/2018 0747             Patient safety screen outcome[de-identified] Passed    Spont breathing trial % for 30 min: Yes            Physical Exam:   Assessment Type: Pre-treatment  General Appearance: Awake  Respiratory Pattern: Assisted  Chest Assessment: Chest expansion symmetrical  Bilateral Breath Sounds: Coarse  Cough: (P) Productive  Suction: ET Tube  O2 Device: (P) vent      Resp Comments: (P) sedation on hold; pt awake , alert; start PS wean

## 2018-05-21 NOTE — PLAN OF CARE
Remains free of harm/injury (restraint for non violent/non self-detsructive behavior) Completed      Returns to optimal restraint-free functioning Completed

## 2018-05-22 ENCOUNTER — ANESTHESIA EVENT (INPATIENT)
Dept: PERIOP | Facility: HOSPITAL | Age: 58
DRG: 710 | End: 2018-05-22
Payer: COMMERCIAL

## 2018-05-22 ENCOUNTER — APPOINTMENT (INPATIENT)
Dept: RADIOLOGY | Facility: HOSPITAL | Age: 58
DRG: 710 | End: 2018-05-22
Payer: COMMERCIAL

## 2018-05-22 ENCOUNTER — ANESTHESIA (INPATIENT)
Dept: PERIOP | Facility: HOSPITAL | Age: 58
DRG: 710 | End: 2018-05-22
Payer: COMMERCIAL

## 2018-05-22 PROBLEM — K66.8 FREE INTRAPERITONEAL AIR: Status: ACTIVE | Noted: 2018-05-15

## 2018-05-22 LAB
ABO GROUP BLD: NORMAL
ALBUMIN SERPL BCP-MCNC: 2.2 G/DL (ref 3.5–5)
ALP SERPL-CCNC: 115 U/L (ref 46–116)
ALT SERPL W P-5'-P-CCNC: 51 U/L (ref 12–78)
AMMONIA PLAS-SCNC: 31 UMOL/L (ref 11–35)
ANION GAP SERPL CALCULATED.3IONS-SCNC: 3 MMOL/L (ref 4–13)
ANION GAP SERPL CALCULATED.3IONS-SCNC: 5 MMOL/L (ref 4–13)
APTT PPP: 31 SECONDS (ref 24–36)
AST SERPL W P-5'-P-CCNC: 47 U/L (ref 5–45)
BASE EXCESS BLDA CALC-SCNC: 7 MMOL/L
BASO STIPL BLD QL SMEAR: PRESENT
BASOPHILS # BLD MANUAL: 0 THOUSAND/UL (ref 0–0.1)
BASOPHILS # BLD MANUAL: 0 THOUSAND/UL (ref 0–0.1)
BASOPHILS NFR MAR MANUAL: 0 % (ref 0–1)
BASOPHILS NFR MAR MANUAL: 0 % (ref 0–1)
BILIRUB SERPL-MCNC: 0.74 MG/DL (ref 0.2–1)
BLASTS NFR BLD MANUAL: 0 %
BLD GP AB SCN SERPL QL: NEGATIVE
BUN SERPL-MCNC: 19 MG/DL (ref 5–25)
BUN SERPL-MCNC: 20 MG/DL (ref 5–25)
CA-I BLD-SCNC: 1.02 MMOL/L (ref 1.12–1.32)
CALCIUM SERPL-MCNC: 7.6 MG/DL (ref 8.3–10.1)
CALCIUM SERPL-MCNC: 8 MG/DL (ref 8.3–10.1)
CHLORIDE SERPL-SCNC: 103 MMOL/L (ref 100–108)
CHLORIDE SERPL-SCNC: 105 MMOL/L (ref 100–108)
CO2 SERPL-SCNC: 32 MMOL/L (ref 21–32)
CO2 SERPL-SCNC: 33 MMOL/L (ref 21–32)
CREAT SERPL-MCNC: 0.69 MG/DL (ref 0.6–1.3)
CREAT SERPL-MCNC: 0.74 MG/DL (ref 0.6–1.3)
EOSINOPHIL # BLD MANUAL: 0 THOUSAND/UL (ref 0–0.4)
EOSINOPHIL # BLD MANUAL: 0 THOUSAND/UL (ref 0–0.4)
EOSINOPHIL NFR BLD MANUAL: 0 % (ref 0–6)
EOSINOPHIL NFR BLD MANUAL: 0 % (ref 0–6)
ERYTHROCYTE [DISTWIDTH] IN BLOOD BY AUTOMATED COUNT: 17.1 % (ref 11.6–15.1)
ERYTHROCYTE [DISTWIDTH] IN BLOOD BY AUTOMATED COUNT: 17.3 % (ref 11.6–15.1)
GFR SERPL CREATININE-BSD FRML MDRD: 104 ML/MIN/1.73SQ M
GFR SERPL CREATININE-BSD FRML MDRD: 107 ML/MIN/1.73SQ M
GLUCOSE SERPL-MCNC: 112 MG/DL (ref 65–140)
GLUCOSE SERPL-MCNC: 119 MG/DL (ref 65–140)
GLUCOSE SERPL-MCNC: 153 MG/DL (ref 65–140)
GLUCOSE SERPL-MCNC: 160 MG/DL (ref 65–140)
HCO3 BLDA-SCNC: 31.1 MMOL/L (ref 22–28)
HCT VFR BLD AUTO: 34 % (ref 36.5–49.3)
HCT VFR BLD AUTO: 36.3 % (ref 36.5–49.3)
HGB BLD-MCNC: 10.8 G/DL (ref 12–17)
HGB BLD-MCNC: 11.3 G/DL (ref 12–17)
HOROWITZ INDEX BLDA+IHG-RTO: 50 MM[HG]
INR PPP: 1.14 (ref 0.86–1.17)
INR PPP: 1.27 (ref 0.86–1.17)
LACTATE SERPL-SCNC: 0.8 MMOL/L (ref 0.5–2)
LACTATE SERPL-SCNC: 1.4 MMOL/L (ref 0.5–2)
LIPASE SERPL-CCNC: 563 U/L (ref 73–393)
LYMPHOCYTES # BLD AUTO: 0.24 THOUSAND/UL (ref 0.6–4.47)
LYMPHOCYTES # BLD AUTO: 0.75 THOUSAND/UL (ref 0.6–4.47)
LYMPHOCYTES # BLD AUTO: 3 % (ref 14–44)
LYMPHOCYTES # BLD AUTO: 6 % (ref 14–44)
MACROCYTES BLD QL AUTO: PRESENT
MAGNESIUM SERPL-MCNC: 1.8 MG/DL (ref 1.6–2.6)
MAGNESIUM SERPL-MCNC: 2.2 MG/DL (ref 1.6–2.6)
MCH RBC QN AUTO: 32.1 PG (ref 26.8–34.3)
MCH RBC QN AUTO: 32.2 PG (ref 26.8–34.3)
MCHC RBC AUTO-ENTMCNC: 31.1 G/DL (ref 31.4–37.4)
MCHC RBC AUTO-ENTMCNC: 31.8 G/DL (ref 31.4–37.4)
MCV RBC AUTO: 101 FL (ref 82–98)
MCV RBC AUTO: 103 FL (ref 82–98)
METAMYELOCYTES NFR BLD MANUAL: 3 % (ref 0–1)
METAMYELOCYTES NFR BLD MANUAL: 8 % (ref 0–1)
MONOCYTES # BLD AUTO: 0.87 THOUSAND/UL (ref 0–1.22)
MONOCYTES # BLD AUTO: 1.03 THOUSAND/UL (ref 0–1.22)
MONOCYTES NFR BLD: 13 % (ref 4–12)
MONOCYTES NFR BLD: 7 % (ref 4–12)
MYELOCYTES NFR BLD MANUAL: 2 % (ref 0–1)
NEUTROPHILS # BLD MANUAL: 10.48 THOUSAND/UL (ref 1.85–7.62)
NEUTROPHILS # BLD MANUAL: 5.87 THOUSAND/UL (ref 1.85–7.62)
NEUTS BAND NFR BLD MANUAL: 5 % (ref 0–8)
NEUTS BAND NFR BLD MANUAL: 9 % (ref 0–8)
NEUTS SEG NFR BLD AUTO: 69 % (ref 43–75)
NEUTS SEG NFR BLD AUTO: 75 % (ref 43–75)
NRBC BLD AUTO-RTO: 1 /100 WBC (ref 0–2)
NRBC BLD AUTO-RTO: 1 /100 WBCS
NRBC BLD AUTO-RTO: 1 /100 WBCS
O2 CT BLDA-SCNC: 15 ML/DL (ref 16–23)
OXYHGB MFR BLDA: 93.4 % (ref 94–97)
PCO2 BLDA: 42.3 MM HG (ref 36–44)
PEEP RESPIRATORY: 5 CM[H2O]
PH BLDA: 7.48 [PH] (ref 7.35–7.45)
PHOSPHATE SERPL-MCNC: 2.7 MG/DL (ref 2.7–4.5)
PLASMA CELLS NFR BLD: 0 % (ref 0–0)
PLATELET # BLD AUTO: 166 THOUSANDS/UL (ref 149–390)
PLATELET # BLD AUTO: 89 THOUSANDS/UL (ref 149–390)
PLATELET BLD QL SMEAR: ABNORMAL
PLATELET BLD QL SMEAR: ADEQUATE
PMV BLD AUTO: 10 FL (ref 8.9–12.7)
PMV BLD AUTO: 9.4 FL (ref 8.9–12.7)
PO2 BLDA: 67.8 MM HG (ref 75–129)
POIKILOCYTOSIS BLD QL SMEAR: PRESENT
POLYCHROMASIA BLD QL SMEAR: PRESENT
POTASSIUM SERPL-SCNC: 4.6 MMOL/L (ref 3.5–5.3)
POTASSIUM SERPL-SCNC: 5.1 MMOL/L (ref 3.5–5.3)
PROCALCITONIN SERPL-MCNC: 0.65 NG/ML
PROMYELOCYTES NFR BLD MANUAL: 0 % (ref 0–0)
PROT SERPL-MCNC: 5.7 G/DL (ref 6.4–8.2)
PROTHROMBIN TIME: 14.7 SECONDS (ref 11.8–14.2)
PROTHROMBIN TIME: 16 SECONDS (ref 11.8–14.2)
RBC # BLD AUTO: 3.36 MILLION/UL (ref 3.88–5.62)
RBC # BLD AUTO: 3.51 MILLION/UL (ref 3.88–5.62)
RBC MORPH BLD: PRESENT
RH BLD: POSITIVE
SODIUM SERPL-SCNC: 140 MMOL/L (ref 136–145)
SODIUM SERPL-SCNC: 141 MMOL/L (ref 136–145)
SPECIMEN EXPIRATION DATE: NORMAL
SPECIMEN SOURCE: ABNORMAL
STOMATOCYTES BLD QL SMEAR: PRESENT
TOTAL CELLS COUNTED SPEC: 100
UNIDENT CELLS # BLD: 0 % (ref 0–0)
VARIANT LYMPHS # BLD AUTO: 0 %
VENT AC: 22
VENT- AC: AC
VT SETTING VENT: 450 ML
WBC # BLD AUTO: 12.48 THOUSAND/UL (ref 4.31–10.16)
WBC # BLD AUTO: 7.93 THOUSAND/UL (ref 4.31–10.16)

## 2018-05-22 PROCEDURE — 0DN80ZZ RELEASE SMALL INTESTINE, OPEN APPROACH: ICD-10-PCS | Performed by: SURGERY

## 2018-05-22 PROCEDURE — 88307 TISSUE EXAM BY PATHOLOGIST: CPT | Performed by: PATHOLOGY

## 2018-05-22 PROCEDURE — 94640 AIRWAY INHALATION TREATMENT: CPT

## 2018-05-22 PROCEDURE — 85014 HEMATOCRIT: CPT

## 2018-05-22 PROCEDURE — 85730 THROMBOPLASTIN TIME PARTIAL: CPT | Performed by: SURGERY

## 2018-05-22 PROCEDURE — 85027 COMPLETE CBC AUTOMATED: CPT | Performed by: PHYSICIAN ASSISTANT

## 2018-05-22 PROCEDURE — 84100 ASSAY OF PHOSPHORUS: CPT | Performed by: PHYSICIAN ASSISTANT

## 2018-05-22 PROCEDURE — 74018 RADEX ABDOMEN 1 VIEW: CPT

## 2018-05-22 PROCEDURE — 0DNE0ZZ RELEASE LARGE INTESTINE, OPEN APPROACH: ICD-10-PCS | Performed by: SURGERY

## 2018-05-22 PROCEDURE — 82805 BLOOD GASES W/O2 SATURATION: CPT | Performed by: FAMILY MEDICINE

## 2018-05-22 PROCEDURE — 82330 ASSAY OF CALCIUM: CPT

## 2018-05-22 PROCEDURE — 30233R1 TRANSFUSION OF NONAUTOLOGOUS PLATELETS INTO PERIPHERAL VEIN, PERCUTANEOUS APPROACH: ICD-10-PCS | Performed by: SURGERY

## 2018-05-22 PROCEDURE — 83605 ASSAY OF LACTIC ACID: CPT | Performed by: PHYSICIAN ASSISTANT

## 2018-05-22 PROCEDURE — 82947 ASSAY GLUCOSE BLOOD QUANT: CPT

## 2018-05-22 PROCEDURE — 82330 ASSAY OF CALCIUM: CPT | Performed by: SURGERY

## 2018-05-22 PROCEDURE — 82948 REAGENT STRIP/BLOOD GLUCOSE: CPT

## 2018-05-22 PROCEDURE — 94002 VENT MGMT INPAT INIT DAY: CPT

## 2018-05-22 PROCEDURE — 80048 BASIC METABOLIC PNL TOTAL CA: CPT | Performed by: SURGERY

## 2018-05-22 PROCEDURE — 84145 PROCALCITONIN (PCT): CPT | Performed by: FAMILY MEDICINE

## 2018-05-22 PROCEDURE — 0DBL0ZZ EXCISION OF TRANSVERSE COLON, OPEN APPROACH: ICD-10-PCS | Performed by: SURGERY

## 2018-05-22 PROCEDURE — 86901 BLOOD TYPING SEROLOGIC RH(D): CPT | Performed by: PHYSICIAN ASSISTANT

## 2018-05-22 PROCEDURE — P9035 PLATELET PHERES LEUKOREDUCED: HCPCS

## 2018-05-22 PROCEDURE — 83735 ASSAY OF MAGNESIUM: CPT | Performed by: PHYSICIAN ASSISTANT

## 2018-05-22 PROCEDURE — 99291 CRITICAL CARE FIRST HOUR: CPT | Performed by: INTERNAL MEDICINE

## 2018-05-22 PROCEDURE — 0DBB0ZZ EXCISION OF ILEUM, OPEN APPROACH: ICD-10-PCS | Performed by: SURGERY

## 2018-05-22 PROCEDURE — 85007 BL SMEAR W/DIFF WBC COUNT: CPT | Performed by: SURGERY

## 2018-05-22 PROCEDURE — 85007 BL SMEAR W/DIFF WBC COUNT: CPT | Performed by: PHYSICIAN ASSISTANT

## 2018-05-22 PROCEDURE — 84295 ASSAY OF SERUM SODIUM: CPT

## 2018-05-22 PROCEDURE — P9100 PATHOGEN TEST FOR PLATELETS: HCPCS

## 2018-05-22 PROCEDURE — 86920 COMPATIBILITY TEST SPIN: CPT

## 2018-05-22 PROCEDURE — 83605 ASSAY OF LACTIC ACID: CPT | Performed by: SURGERY

## 2018-05-22 PROCEDURE — 82803 BLOOD GASES ANY COMBINATION: CPT

## 2018-05-22 PROCEDURE — 0D1B0Z4 BYPASS ILEUM TO CUTANEOUS, OPEN APPROACH: ICD-10-PCS | Performed by: SURGERY

## 2018-05-22 PROCEDURE — 82140 ASSAY OF AMMONIA: CPT | Performed by: FAMILY MEDICINE

## 2018-05-22 PROCEDURE — 85610 PROTHROMBIN TIME: CPT | Performed by: SURGERY

## 2018-05-22 PROCEDURE — 83690 ASSAY OF LIPASE: CPT | Performed by: FAMILY MEDICINE

## 2018-05-22 PROCEDURE — 86900 BLOOD TYPING SEROLOGIC ABO: CPT | Performed by: PHYSICIAN ASSISTANT

## 2018-05-22 PROCEDURE — 86850 RBC ANTIBODY SCREEN: CPT | Performed by: PHYSICIAN ASSISTANT

## 2018-05-22 PROCEDURE — 84132 ASSAY OF SERUM POTASSIUM: CPT

## 2018-05-22 PROCEDURE — P9021 RED BLOOD CELLS UNIT: HCPCS

## 2018-05-22 PROCEDURE — 94760 N-INVAS EAR/PLS OXIMETRY 1: CPT

## 2018-05-22 PROCEDURE — 85610 PROTHROMBIN TIME: CPT | Performed by: PHYSICIAN ASSISTANT

## 2018-05-22 PROCEDURE — 71045 X-RAY EXAM CHEST 1 VIEW: CPT

## 2018-05-22 PROCEDURE — 85027 COMPLETE CBC AUTOMATED: CPT | Performed by: SURGERY

## 2018-05-22 PROCEDURE — 80053 COMPREHEN METABOLIC PANEL: CPT | Performed by: PHYSICIAN ASSISTANT

## 2018-05-22 PROCEDURE — 83735 ASSAY OF MAGNESIUM: CPT | Performed by: SURGERY

## 2018-05-22 PROCEDURE — C9113 INJ PANTOPRAZOLE SODIUM, VIA: HCPCS | Performed by: PHYSICIAN ASSISTANT

## 2018-05-22 RX ORDER — IPRATROPIUM BROMIDE AND ALBUTEROL SULFATE 2.5; .5 MG/3ML; MG/3ML
SOLUTION RESPIRATORY (INHALATION)
Status: DISPENSED
Start: 2018-05-22 | End: 2018-05-22

## 2018-05-22 RX ORDER — MAGNESIUM SULFATE HEPTAHYDRATE 40 MG/ML
2 INJECTION, SOLUTION INTRAVENOUS ONCE
Status: COMPLETED | OUTPATIENT
Start: 2018-05-22 | End: 2018-05-22

## 2018-05-22 RX ORDER — LEVALBUTEROL 1.25 MG/.5ML
SOLUTION, CONCENTRATE RESPIRATORY (INHALATION)
Status: COMPLETED
Start: 2018-05-22 | End: 2018-05-22

## 2018-05-22 RX ORDER — ETOMIDATE 2 MG/ML
INJECTION INTRAVENOUS AS NEEDED
Status: DISCONTINUED | OUTPATIENT
Start: 2018-05-22 | End: 2018-05-22 | Stop reason: SURG

## 2018-05-22 RX ORDER — DIAZEPAM 5 MG/ML
5 INJECTION, SOLUTION INTRAMUSCULAR; INTRAVENOUS EVERY 6 HOURS
Status: DISCONTINUED | OUTPATIENT
Start: 2018-05-22 | End: 2018-05-24

## 2018-05-22 RX ORDER — SUCCINYLCHOLINE CHLORIDE 20 MG/ML
INJECTION INTRAMUSCULAR; INTRAVENOUS AS NEEDED
Status: DISCONTINUED | OUTPATIENT
Start: 2018-05-22 | End: 2018-05-22 | Stop reason: SURG

## 2018-05-22 RX ORDER — LIDOCAINE HYDROCHLORIDE 10 MG/ML
INJECTION, SOLUTION INFILTRATION; PERINEURAL AS NEEDED
Status: DISCONTINUED | OUTPATIENT
Start: 2018-05-22 | End: 2018-05-22 | Stop reason: SURG

## 2018-05-22 RX ORDER — LEVALBUTEROL 1.25 MG/.5ML
SOLUTION, CONCENTRATE RESPIRATORY (INHALATION)
Status: DISPENSED
Start: 2018-05-22 | End: 2018-05-22

## 2018-05-22 RX ORDER — SODIUM CHLORIDE, SODIUM GLUCONATE, SODIUM ACETATE, POTASSIUM CHLORIDE, MAGNESIUM CHLORIDE, SODIUM PHOSPHATE, DIBASIC, AND POTASSIUM PHOSPHATE .53; .5; .37; .037; .03; .012; .00082 G/100ML; G/100ML; G/100ML; G/100ML; G/100ML; G/100ML; G/100ML
500 INJECTION, SOLUTION INTRAVENOUS ONCE
Status: COMPLETED | OUTPATIENT
Start: 2018-05-22 | End: 2018-05-22

## 2018-05-22 RX ORDER — ONDANSETRON 2 MG/ML
INJECTION INTRAMUSCULAR; INTRAVENOUS AS NEEDED
Status: DISCONTINUED | OUTPATIENT
Start: 2018-05-22 | End: 2018-05-22 | Stop reason: SURG

## 2018-05-22 RX ORDER — ALBUMIN, HUMAN INJ 5% 5 %
SOLUTION INTRAVENOUS CONTINUOUS PRN
Status: DISCONTINUED | OUTPATIENT
Start: 2018-05-22 | End: 2018-05-22 | Stop reason: SURG

## 2018-05-22 RX ORDER — PROPOFOL 10 MG/ML
5-50 INJECTION, EMULSION INTRAVENOUS
Status: DISCONTINUED | OUTPATIENT
Start: 2018-05-22 | End: 2018-05-23

## 2018-05-22 RX ORDER — METOPROLOL TARTRATE 5 MG/5ML
INJECTION INTRAVENOUS AS NEEDED
Status: DISCONTINUED | OUTPATIENT
Start: 2018-05-22 | End: 2018-05-22 | Stop reason: SURG

## 2018-05-22 RX ORDER — MAGNESIUM HYDROXIDE 1200 MG/15ML
LIQUID ORAL AS NEEDED
Status: DISCONTINUED | OUTPATIENT
Start: 2018-05-22 | End: 2018-05-22 | Stop reason: HOSPADM

## 2018-05-22 RX ORDER — SODIUM CHLORIDE, SODIUM LACTATE, POTASSIUM CHLORIDE, CALCIUM CHLORIDE 600; 310; 30; 20 MG/100ML; MG/100ML; MG/100ML; MG/100ML
100 INJECTION, SOLUTION INTRAVENOUS CONTINUOUS
Status: DISCONTINUED | OUTPATIENT
Start: 2018-05-22 | End: 2018-05-23

## 2018-05-22 RX ORDER — CHLORHEXIDINE GLUCONATE 0.12 MG/ML
15 RINSE ORAL EVERY 12 HOURS SCHEDULED
Status: DISCONTINUED | OUTPATIENT
Start: 2018-05-22 | End: 2018-05-23

## 2018-05-22 RX ORDER — LEVALBUTEROL 1.25 MG/.5ML
1.25 SOLUTION, CONCENTRATE RESPIRATORY (INHALATION)
Status: DISCONTINUED | OUTPATIENT
Start: 2018-05-22 | End: 2018-05-22

## 2018-05-22 RX ORDER — PANTOPRAZOLE SODIUM 40 MG/1
40 INJECTION, POWDER, FOR SOLUTION INTRAVENOUS
Status: DISCONTINUED | OUTPATIENT
Start: 2018-05-22 | End: 2018-05-24

## 2018-05-22 RX ORDER — ALBUTEROL SULFATE 2.5 MG/3ML
2.5 SOLUTION RESPIRATORY (INHALATION) EVERY 6 HOURS PRN
Status: DISCONTINUED | OUTPATIENT
Start: 2018-05-22 | End: 2018-05-22

## 2018-05-22 RX ORDER — FENTANYL CITRATE 50 UG/ML
INJECTION, SOLUTION INTRAMUSCULAR; INTRAVENOUS AS NEEDED
Status: DISCONTINUED | OUTPATIENT
Start: 2018-05-22 | End: 2018-05-22 | Stop reason: SURG

## 2018-05-22 RX ORDER — SODIUM CHLORIDE 9 MG/ML
100 INJECTION, SOLUTION INTRAVENOUS CONTINUOUS
Status: DISCONTINUED | OUTPATIENT
Start: 2018-05-23 | End: 2018-05-23

## 2018-05-22 RX ORDER — PANTOPRAZOLE SODIUM 40 MG/1
40 INJECTION, POWDER, FOR SOLUTION INTRAVENOUS DAILY
Status: DISCONTINUED | OUTPATIENT
Start: 2018-05-22 | End: 2018-05-22 | Stop reason: SDUPTHER

## 2018-05-22 RX ORDER — GLYCOPYRROLATE 0.2 MG/ML
INJECTION INTRAMUSCULAR; INTRAVENOUS AS NEEDED
Status: DISCONTINUED | OUTPATIENT
Start: 2018-05-22 | End: 2018-05-22 | Stop reason: SURG

## 2018-05-22 RX ORDER — LEVALBUTEROL 1.25 MG/.5ML
1.25 SOLUTION, CONCENTRATE RESPIRATORY (INHALATION)
Status: DISCONTINUED | OUTPATIENT
Start: 2018-05-22 | End: 2018-05-29

## 2018-05-22 RX ORDER — CALCIUM CHLORIDE 100 MG/ML
INJECTION INTRAVENOUS; INTRAVENTRICULAR AS NEEDED
Status: DISCONTINUED | OUTPATIENT
Start: 2018-05-22 | End: 2018-05-22 | Stop reason: SURG

## 2018-05-22 RX ADMIN — SODIUM CHLORIDE, SODIUM GLUCONATE, SODIUM ACETATE, POTASSIUM CHLORIDE, MAGNESIUM CHLORIDE, SODIUM PHOSPHATE, DIBASIC, AND POTASSIUM PHOSPHATE 500 ML: .53; .5; .37; .037; .03; .012; .00082 INJECTION, SOLUTION INTRAVENOUS at 22:28

## 2018-05-22 RX ADMIN — FOLIC ACID 1 MG: 5 INJECTION, SOLUTION INTRAMUSCULAR; INTRAVENOUS; SUBCUTANEOUS at 08:05

## 2018-05-22 RX ADMIN — ALBUMIN HUMAN: 0.05 INJECTION, SOLUTION INTRAVENOUS at 12:20

## 2018-05-22 RX ADMIN — HYDROCORTISONE SODIUM SUCCINATE 100 MG: 100 INJECTION, POWDER, FOR SOLUTION INTRAMUSCULAR; INTRAVENOUS at 10:36

## 2018-05-22 RX ADMIN — METRONIDAZOLE 500 MG: 500 INJECTION, SOLUTION INTRAVENOUS at 10:20

## 2018-05-22 RX ADMIN — FENTANYL CITRATE 50 MCG: 50 INJECTION, SOLUTION INTRAMUSCULAR; INTRAVENOUS at 10:35

## 2018-05-22 RX ADMIN — HYDROMORPHONE HYDROCHLORIDE 0.5 MG: 1 INJECTION, SOLUTION INTRAMUSCULAR; INTRAVENOUS; SUBCUTANEOUS at 16:05

## 2018-05-22 RX ADMIN — CALCIUM CHLORIDE 0.5 G: 100 INJECTION PARENTERAL at 13:05

## 2018-05-22 RX ADMIN — LEVALBUTEROL HYDROCHLORIDE 1.25 MG: 1.25 SOLUTION, CONCENTRATE RESPIRATORY (INHALATION) at 19:22

## 2018-05-22 RX ADMIN — HYDROMORPHONE HYDROCHLORIDE 0.5 MG: 1 INJECTION, SOLUTION INTRAMUSCULAR; INTRAVENOUS; SUBCUTANEOUS at 20:10

## 2018-05-22 RX ADMIN — HYDROMORPHONE HYDROCHLORIDE 0.5 MG: 1 INJECTION, SOLUTION INTRAMUSCULAR; INTRAVENOUS; SUBCUTANEOUS at 08:27

## 2018-05-22 RX ADMIN — SODIUM CHLORIDE, SODIUM LACTATE, POTASSIUM CHLORIDE, AND CALCIUM CHLORIDE 100 ML/HR: .6; .31; .03; .02 INJECTION, SOLUTION INTRAVENOUS at 15:45

## 2018-05-22 RX ADMIN — NICOTINE 14 MG: 14 PATCH, EXTENDED RELEASE TRANSDERMAL at 08:05

## 2018-05-22 RX ADMIN — LEVALBUTEROL HYDROCHLORIDE 1.25 MG: 1.25 SOLUTION, CONCENTRATE RESPIRATORY (INHALATION) at 08:16

## 2018-05-22 RX ADMIN — DEXMEDETOMIDINE HYDROCHLORIDE 0.2 MCG/KG/HR: 100 INJECTION, SOLUTION INTRAVENOUS at 15:16

## 2018-05-22 RX ADMIN — SUCCINYLCHOLINE CHLORIDE 100 MG: 20 INJECTION, SOLUTION INTRAMUSCULAR; INTRAVENOUS at 10:13

## 2018-05-22 RX ADMIN — THIAMINE HYDROCHLORIDE 100 MG: 100 INJECTION, SOLUTION INTRAMUSCULAR; INTRAVENOUS at 08:04

## 2018-05-22 RX ADMIN — CHLORHEXIDINE GLUCONATE 15 ML: 1.2 RINSE ORAL at 21:05

## 2018-05-22 RX ADMIN — SODIUM CHLORIDE: 0.9 INJECTION, SOLUTION INTRAVENOUS at 10:18

## 2018-05-22 RX ADMIN — IPRATROPIUM BROMIDE 0.5 MG: 0.5 SOLUTION RESPIRATORY (INHALATION) at 08:17

## 2018-05-22 RX ADMIN — PANTOPRAZOLE SODIUM 40 MG: 40 INJECTION, POWDER, FOR SOLUTION INTRAVENOUS at 08:04

## 2018-05-22 RX ADMIN — BUDESONIDE 0.5 MG: 0.5 INHALANT RESPIRATORY (INHALATION) at 19:22

## 2018-05-22 RX ADMIN — Medication 3.38 G: at 15:16

## 2018-05-22 RX ADMIN — HYDROMORPHONE HYDROCHLORIDE 0.5 MG: 1 INJECTION, SOLUTION INTRAMUSCULAR; INTRAVENOUS; SUBCUTANEOUS at 17:38

## 2018-05-22 RX ADMIN — MAGNESIUM SULFATE HEPTAHYDRATE 2 G: 40 INJECTION, SOLUTION INTRAVENOUS at 17:37

## 2018-05-22 RX ADMIN — LIDOCAINE HYDROCHLORIDE 50 MG: 10 INJECTION, SOLUTION INFILTRATION; PERINEURAL at 10:13

## 2018-05-22 RX ADMIN — DEXMEDETOMIDINE HYDROCHLORIDE 0.3 MCG/KG/HR: 100 INJECTION, SOLUTION INTRAVENOUS at 21:07

## 2018-05-22 RX ADMIN — FENTANYL CITRATE 50 MCG: 50 INJECTION, SOLUTION INTRAMUSCULAR; INTRAVENOUS at 10:13

## 2018-05-22 RX ADMIN — NEOSTIGMINE METHYLSULFATE 2 MG: 1 INJECTION, SOLUTION INTRAMUSCULAR; INTRAVENOUS; SUBCUTANEOUS at 13:49

## 2018-05-22 RX ADMIN — OXAZEPAM 15 MG: 15 CAPSULE, GELATIN COATED ORAL at 01:53

## 2018-05-22 RX ADMIN — HYDROMORPHONE HYDROCHLORIDE 1 MG: 1 INJECTION, SOLUTION INTRAMUSCULAR; INTRAVENOUS; SUBCUTANEOUS at 11:05

## 2018-05-22 RX ADMIN — HYDROMORPHONE HYDROCHLORIDE 1 MG: 1 INJECTION, SOLUTION INTRAMUSCULAR; INTRAVENOUS; SUBCUTANEOUS at 12:14

## 2018-05-22 RX ADMIN — METHYLPREDNISOLONE SODIUM SUCCINATE 40 MG: 40 INJECTION, POWDER, FOR SOLUTION INTRAMUSCULAR; INTRAVENOUS at 22:11

## 2018-05-22 RX ADMIN — GLYCOPYRROLATE 0.4 MG: 0.2 INJECTION, SOLUTION INTRAMUSCULAR; INTRAVENOUS at 13:08

## 2018-05-22 RX ADMIN — BUDESONIDE 0.5 MG: 0.5 INHALANT RESPIRATORY (INHALATION) at 06:13

## 2018-05-22 RX ADMIN — CALCIUM GLUCONATE 1 G: 98 INJECTION, SOLUTION INTRAVENOUS at 17:32

## 2018-05-22 RX ADMIN — ALBUMIN HUMAN: 0.05 INJECTION, SOLUTION INTRAVENOUS at 10:50

## 2018-05-22 RX ADMIN — AMIODARONE HYDROCHLORIDE 0.5 MG/MIN: 50 INJECTION, SOLUTION INTRAVENOUS at 22:15

## 2018-05-22 RX ADMIN — CALCIUM CHLORIDE 0.5 G: 100 INJECTION PARENTERAL at 12:12

## 2018-05-22 RX ADMIN — METHYLPREDNISOLONE SODIUM SUCCINATE 40 MG: 40 INJECTION, POWDER, FOR SOLUTION INTRAMUSCULAR; INTRAVENOUS at 06:02

## 2018-05-22 RX ADMIN — ETOMIDATE 20 MG: 2 INJECTION INTRAVENOUS at 10:13

## 2018-05-22 RX ADMIN — PROPOFOL 10 MCG/KG/MIN: 10 INJECTION, EMULSION INTRAVENOUS at 14:58

## 2018-05-22 RX ADMIN — Medication 5 MG: at 15:16

## 2018-05-22 RX ADMIN — NEOSTIGMINE METHYLSULFATE 3 MG: 1 INJECTION, SOLUTION INTRAMUSCULAR; INTRAVENOUS; SUBCUTANEOUS at 13:08

## 2018-05-22 RX ADMIN — ONDANSETRON 4 MG: 2 INJECTION INTRAMUSCULAR; INTRAVENOUS at 12:59

## 2018-05-22 RX ADMIN — SODIUM CHLORIDE, SODIUM LACTATE, POTASSIUM CHLORIDE, AND CALCIUM CHLORIDE: .6; .31; .03; .02 INJECTION, SOLUTION INTRAVENOUS at 09:50

## 2018-05-22 RX ADMIN — IPRATROPIUM BROMIDE 0.5 MG: 0.5 SOLUTION RESPIRATORY (INHALATION) at 06:14

## 2018-05-22 RX ADMIN — Medication 5 MG: at 22:10

## 2018-05-22 RX ADMIN — SODIUM CHLORIDE: 0.9 INJECTION, SOLUTION INTRAVENOUS at 11:35

## 2018-05-22 RX ADMIN — SODIUM CHLORIDE, SODIUM LACTATE, POTASSIUM CHLORIDE, AND CALCIUM CHLORIDE: .6; .31; .03; .02 INJECTION, SOLUTION INTRAVENOUS at 12:55

## 2018-05-22 RX ADMIN — GLYCOPYRROLATE 0.4 MG: 0.2 INJECTION, SOLUTION INTRAMUSCULAR; INTRAVENOUS at 13:49

## 2018-05-22 RX ADMIN — LEVALBUTEROL HYDROCHLORIDE 1.25 MG: 1.25 SOLUTION, CONCENTRATE RESPIRATORY (INHALATION) at 06:14

## 2018-05-22 RX ADMIN — IPRATROPIUM BROMIDE 0.5 MG: 0.5 SOLUTION RESPIRATORY (INHALATION) at 19:22

## 2018-05-22 RX ADMIN — CEFAZOLIN SODIUM 2000 MG: 1 SOLUTION INTRAVENOUS at 10:20

## 2018-05-22 RX ADMIN — Medication 3.38 G: at 21:05

## 2018-05-22 RX ADMIN — METOPROLOL TARTRATE 2.5 MG: 1 INJECTION, SOLUTION INTRAVENOUS at 10:42

## 2018-05-22 NOTE — ANESTHESIA PREPROCEDURE EVALUATION
Review of Systems/Medical History  Patient summary reviewed  Chart reviewed  No history of anesthetic complications     Cardiovascular  Hyperlipidemia, Hypertension , Past MI , CAD , Cardiac stents ( Inferior MI status post RCA stent in 2012 )  Dysrhythmias (on eliquis) , CHF (on lasix) ,   Comment: Recent ? ETOH cardiomyopathy    ECHO 5/19/18: The ventricle was mildly dilated  Systolic function was severely reduced by visual assessment  Ejection fraction was estimated to be 25 %  There was severe diffuse hypokinesis with regional variations      RIGHT VENTRICLE:  Systolic function was moderately reduced  Systolic pressure was mildly increased  Estimated peak pressure was 40 mmHg      MITRAL VALVE:  There was mild regurgitation      AORTIC VALVE:  Transaortic velocity was increased due to valvular stenosis  There was mild stenosis  There was mild regurgitation      TRICUSPID VALVE:  There was mild regurgitation  EKG: Normal sinus rhythm, Rightward axis  Anteroseptal infarct (cited on or before 19-MAY-2018)  Marked ST abnormality, possible inferior subendocardial injury  Abnormal ECG  When compared with ECG of 20-MAY-2018 07:59, (unconfirmed)  Premature ectopic complexes are no longer Present  [place] interval has increased  Serial changes of Anteroseptal infarct     Cardiac cath 2017:Native coronary lesions:  ·Mid LAD: Lesion 1: 20 % stenosis  ·Mid circumflex: Lesion 1: 20 % stenosis    ·Mid RCA: Lesion 1: 0 % stenosis, site of prior stent    ,  Pulmonary  COPD moderate- medication dependent , Asthma ,   Comment: Recent intubation, extubated 5/21/18     GI/Hepatic    Bowel prep  Comment: ? Perforation  ETOH withdrawal  Hx ulcerative colitis post right hemicolectomy     Negative  ROS        Endo/Other  Negative endo/other ROS      GYN       Hematology      Comment: plts 79k today  INR 1 1 today Musculoskeletal  Negative musculoskeletal ROS        Neurology  Negative neurology ROS      Psychology   Negative ángel ROS                   Anesthesia Plan  ASA Score- 4 Emergent    Anesthesia Type- general with ASA Monitors  Additional Monitors: arterial line and central venous line  Airway Plan: ETT  Comment: GA with ETT, IV, [place], TLC, blood products, postop mechanical ventilation  Lactate 0 8 today    Plan Factors-    Induction- intravenous and rapid sequence induction  Postoperative Plan-     Informed Consent- Anesthetic plan and risks discussed with patient  I personally reviewed this patient with the CRNA  Discussed and agreed on the Anesthesia Plan with the CRNA  Georgette Ormond

## 2018-05-22 NOTE — ANESTHESIA PROCEDURE NOTES
Arterial Line Insertion  Date/Time: 5/22/2018 10:18 AM  Performed by: Jonas Crespo  Authorized by: Edyth Seip, ANNA   Consent: Verbal consent obtained  Risks and benefits: risks, benefits and alternatives were discussed  Consent given by: patient  Required items: required blood products, implants, devices, and special equipment available  Patient identity confirmed: verbally with patient and arm band  Preparation: Patient was prepped and draped in the usual sterile fashion    Indications: hemodynamic monitoring  Orientation:  RightLocation: radial artery  Romie's test normal: yes  Needle gauge: 20  Number of attempts: 1  Post-procedure: dressing applied  Patient tolerance: Patient tolerated the procedure well with no immediate complications

## 2018-05-22 NOTE — PROGRESS NOTES
Critical Care Interval Progress Note     Bobbi Nguyen 56 y o  male MRN: [id number]    [facility]: [id number]    Impression:  Principal Problem:    Acute respiratory failure with hypoxia [facility])  Active Problems:    Colitis    Coronary artery disease involving native coronary artery of native heart    COPD with exacerbation (HCC)    Shock ([facility] [address] )    Lactic acidosis    High anion gap metabolic acidosis    Acute kidney failure (HCC)    Transaminitis    Elevated troponin    Hypokalemia    Hyponatremia    Alcohol abuse    Tobacco abuse    Alcohol dependence with withdrawal ([facility])    Bronchospasm, acute    Agitation    Heart failure, systolic, due to idiopathic cardiomyopathy ([facility] [address] )    Free intraperitoneal air  Resolved Problems:    * No resolved hospital problems  *    Operative day following exploratory laparotomy, ileocolectomy, ileocolic anastomosis, loop ileostomy, extensive lysis of adhesions and placement of wound VAC  Per discussion with general surgery resident, a portion of the colon adjacent to the anastomosis from right hemicolectomy was ischemic- bowel was resected from proximal to the anastomosis to the splenic flexure with stool spillage into the peritoneal cavity  2 SANTA drains were placed, and an ostomy was created  Pre-opratively and intraoperatively, he received 2 units of platelets total and 1 unit of PRBCs, 500 mL Albumin 5%, 1 liter LR, 2 liters NS  He was unable to be extubated in the OR due to inability to protect airway  Plan:    Neuro:   -  Intubated, will continue sedation with Propofol and Precedex titration  Valium 5 mg every 6 hours  Continue IV thiamine and folate supplementation  CV:   - Afib with RVR- Continue Amiodarone drip at 0 5 mg/min  Eliquis held, will consider starting Heparin tomorrow  Pulm:   - Intubated with AC/VC 22/450/50%/5 with Vmax 60  Sedation plan as above   In the setting of acute COPD exacerbation will continue Xopenex and Atrovent q6 with Pulmicort BID  Continue Solumedrol 40 mg q8  Follow-up ABG and chest XR  GI:  - Continue post-operative care per general surgery  Follow-up post-op labs  Dilaudid 0 5 mg every 1 hour PRN  - GI prophylaxis- Protonix 40 mg     :  - Will monitor Is and Os closely during post-operative period  FEN:  - NPO  - Replete electrolytes as indicated  - IV hydration  mL/hr     ID:  - Continue Zosyn 3 375 g q6, will monitor for fever and trend leukocytosis      Heme:  - Received 2 units platelets and 1 unit PRBCs as above, will follow-up post-op labs     Endo:  - SSI #6, accuchecks every 6 hours while NPO     MSK/Skin:  - Frequent repositioning, PT/OT when appropriate     Disposition:  Continue ICU level care     ______________________________________________________________________    Chief Complaint: None, intubated     Recent Events / Nursing Concern: Post-op as outlined in plan above     Vitals:   Vitals:    18 0815 18 0844 18 0937 18 1415   BP:  144/93  140/90   BP Location:  Left arm     Pulse: 90 92 94 79   Resp:  21 (!) 38 16   Temp:  99 1 °F (37 3 °C) 99 °F (37 2 °C) (!) 97 2 °F (36 2 °C)   TempSrc:  Oral Tympanic Tympanic   SpO2: 95% 92%  100%   Weight:       Height:                 Temperature: Temp (24hrs), Av 8 °F (37 1 °C), Min:97 2 °F (36 2 °C), Max:99 2 °F (37 3 °C)  Current: Temperature: (!) 97 2 °F (36 2 °C)    Hemodynamic Monitoring:  N/A    Respiratory:  SpO2: SpO2: 100 %, SpO2 Activity: SpO2 Activity: At Rest, SpO2 Device: O2 Device:  (on ventilator), Capnography:    O2 Flow Rate (L/min): 3 L/min    Physical Exam:  Physical Exam   Constitutional: He appears well-developed and well-nourished  No distress  HENT:   Head: Normocephalic and atraumatic  Neck: Normal range of motion  Neck supple  Cardiovascular: Normal rate, regular rhythm, normal heart sounds and intact distal pulses  No murmur heard  Pulmonary/Chest: Effort normal  No respiratory distress   He has wheezes  Diffuse inspiratory and expiratory wheezing bilaterally with diminished breath sounds at bases    Abdominal: Soft  There is tenderness  2 SANTA drains in place, colostomy bag in place, wound vac in place    Neurological: He is alert  Somnolent but opens eyes to verbal stimuli and follows commands    Skin: Skin is warm  He is not diaphoretic  Vitals reviewed  Allergies:    Allergies   Allergen Reactions    Other      Brown cloth band aids          Medications:   Scheduled Meds:    Current Facility-Administered Medications:  amiodarone 0 5 mg/min Intravenous Continuous Surekha Cochran PA-C Last Rate: 0 5 mg/min (05/22/18 0950)   atorvastatin 40 mg Oral Daily With Yolanda Dhillon MD    budesonide 0 5 mg Nebulization Q12H Nyasia Serrato PA-C    chlorhexidine 15 mL Swish & Spit Q12H [address], DO    dexmedetomidine 0 1-0 7 mcg/kg/hr Intravenous Titrated Megan Roach, DO    diazepam 5 mg Intravenous Q6H Megan Roach, DO    folic acid IVPB 1 mg Intravenous Daily Son Albarado PA-C Last Rate: Stopped (05/22/18 0435)   HYDROmorphone 0 5 mg Intravenous Q1H PRN Britni Canela PA-C    insulin lispro 5-25 Units Subcutaneous Q6H [address] Surekha Cochran PA-C    ipratropium 0 5 mg Nebulization Q6H Danyell Saavedra, DO    ipratropium-albuterol        lactated ringers 100 mL/hr Intravenous Continuous Britni Canela PA-C Last Rate: 0 mL/hr (05/22/18 1254)   levalbuterol        levalbuterol 1 25 mg Nebulization Q6H Danyell Saavedra, DO    LORazepam 0 5 mg Intravenous Q2H PRN Megan Roach, DO    Or        LORazepam 1 mg Intravenous Q2H PRN Megan Roach, DO    Or        LORazepam 2 mg Intravenous Q2H PRN Megan Roach, DO    methylPREDNISolone sodium succinate 40 mg Intravenous [facility] AMALIA Clifton    metoprolol 5 mg Intravenous Q6H PRN Surekha Cochran PA-C    nicotine 14 mg Transdermal Daily AMALIA Coyle    pantoprazole 40 mg Intravenous Early Morning Ashwini Patton PA-C    piperacillin-tazobactam 3 375 g Intravenous Q6H Belynda See, DO    propofol 5-50 mcg/kg/min Intravenous Titrated Belynda See, DO Last Rate: 10 mcg/kg/min (05/22/18 1458)   [START ON 5/23/2018] sodium chloride 100 mL/hr Intravenous Continuous Brooke Hamman Last Rate: Stopped (05/22/18 1257)   thiamine 100 mg Intravenous Daily Ashwini Patton PA-C Last Rate: Stopped (05/22/18 0161)     Continuous Infusions:    amiodarone 0 5 mg/min Last Rate: 0 5 mg/min (05/22/18 0950)   dexmedetomidine 0 1-0 7 mcg/kg/hr    lactated ringers 100 mL/hr Last Rate: 0 mL/hr (05/22/18 1254)   propofol 5-50 mcg/kg/min Last Rate: 10 mcg/kg/min (05/22/18 1458)   [START ON 5/23/2018] sodium chloride 100 mL/hr Last Rate: Stopped (05/22/18 1257)     PRN Meds:    HYDROmorphone 0 5 mg Q1H PRN   LORazepam 0 5 mg Q2H PRN   Or     LORazepam 1 mg Q2H PRN   Or     LORazepam 2 mg Q2H PRN   metoprolol 5 mg Q6H PRN       Labs:     Results from last 7 days  Lab Units 05/22/18 1432 05/22/18 0447 05/21/18 0425 05/20/18 0427  05/18/18  1909   WBC Thousand/uL 7 93 12 48* 7 06 10 15  --  15 07*   HEMOGLOBIN g/dL 10 8* 11 3* 11 0* 10 9*  --  12 5   I STAT HEMOGLOBIN   --   --   --   --   < >  --    HEMATOCRIT % 34 0* 36 3* 33 9* 32 8*  --  37 2   PLATELETS Thousands/uL 166 89* 86* 63*  --  76*   NEUTROS PCT %  --   --  71 83*  --  87*   MONOS PCT %  --   --  20* 10  --  7   MONO PCT MAN % 13* 7  --   --   --   --    < > = values in this interval not displayed      Results from last 7 days  Lab Units 05/22/18  1432 05/22/18 0447 05/21/18 0425  05/19/18  0439   SODIUM mmol/L 141 140 144  < > 142   POTASSIUM mmol/L 5 1 4 6 3 9  < > 3 6   CHLORIDE mmol/L 105 103 105  < > 104   CO2 mmol/L 33* 32 35*  < > 32   BUN mg/dL 19 20 14  < > 11   CREATININE mg/dL 0 69 0 74 0 86  < > 0 77   CALCIUM mg/dL 8 0* 7 6* 7 4*  < > 6 7*   TOTAL PROTEIN g/dL  --  5 7* 5 0*  --  5 7*   BILIRUBIN TOTAL mg/dL  --  0 74 0 68  --  0 70   ALK PHOS U/L --  115 131*  --  170*   ALT U/L  --  51 55  --  62   AST U/L  --  47* 50*  --  100*   GLUCOSE RANDOM mg/dL 153* 112 124  < > 182*   GLUCOSE, ISTAT   --   --   --   < >  --    < > = values in this interval not displayed  Results from last 7 days  Lab Units 05/22/18  1432 05/22/18  0447 05/21/18  0425   MAGNESIUM mg/dL 1 8 2 2 2 0       Results from last 7 days  Lab Units 05/22/18  0447 05/21/18  0425 05/20/18  2209   PHOSPHORUS mg/dL 2 7 3 0 2 7        Results from last 7 days  Lab Units 05/22/18  1432 05/22/18  0447 05/21/18  0425   INR  1 27* 1 14 1 09   PTT seconds 31  --   --        Results from last 7 days  Lab Units 05/22/18  1432 05/22/18  0824 05/16/18  0722 05/16/18  0413 05/16/18  0209   LACTIC ACID mmol/L 1 4 0 8 1 6 2 8* 3 8*       0  Lab Value Date/Time   TROPONINI 0 12 (H) 05/20/2018 1753   TROPONINI 0 17 (H) 05/20/2018 1505   TROPONINI 0 17 (H) 05/20/2018 1214   TROPONINI 0 29 (H) 05/19/2018 0438   TROPONINI 0 30 (H) 05/19/2018 0154   TROPONINI 0 32 (H) 05/18/2018 2229   TROPONINI 0 32 (H) 05/18/2018 1909   TROPONINI 1 15 (H) 05/16/2018 1416   TROPONINI 1 24 (H) 05/16/2018 0522   TROPONINI 1 16 (H) 05/16/2018 0209   TROPONINI 1 09 (H) 05/16/2018 0053   TROPONINI 0 85 (H) 05/15/2018 1935   TROPONINI 0 72 (H) 05/15/2018 1728   TROPONINI 0 47 (H) 05/15/2018 1304   TROPONINI 0 02 12/07/2016 1931   TROPONINI 0 04 (H) 12/07/2016 0907   TROPONINI <0 04 05/22/2015 0123       Results from last 7 days  Lab Units 05/19/18  1603 05/16/18  1823 05/16/18  1423 05/16/18  1055 05/16/18  0835   PH ART  7 385 7 337* 7 320* 7 277* 7 173*   PCO2 ART mm Hg 49 5* 48 4* 54 1* 56 1* 68 7*   PO2 ART mm Hg 284 5* 92 1 89 4 80 6 82 2   HCO3 ART mmol/L 29 0* 25 4 27 2 25 6 24 7   BASE EXC ART mmol/L 3 2 -0 8 0 4 -1 8 -4 9   ABG SOURCE  Radial, Right Radial, Right Radial, Right Radial, Left Radial, Right       Diagnostic Imaging / Data: I have personally reviewed pertinent reports       Chest XR- Free intraperitoneal air present  Abdominal XR- No metallic surgical instrument identified  Code Status: Level 1 - Full Code    Portions of the record may have been created with voice recognition software  Occasional wrong word or "sound a like" substitutions may have occurred due to the inherent limitations of voice recognition software  Read the chart carefully and recognize, using context, where substitutions have occurred      SIGNATURE: Melissa Beltrán DO  DATE: May 22, 2018  TIME: 3:15 PM

## 2018-05-22 NOTE — PROGRESS NOTES
Incidental free air seen on routine am CXR  Pt  c/o abdominal pain  Tender to palpation however advises not significantly different than his "typical ulcerative colitis pain"  Currently HD stable  /89, R 21, Pulse 98  SpO2 98%  Made NPO, put in for lactic, type and screen  Notified general surgery for eval  Attending notified  Will monitor

## 2018-05-22 NOTE — PROGRESS NOTES
Progress Note - Critical Care   Zenon Owen 56 y o  male MRN: [id number]  Unit/Bed#: MICU 06 Encounter: [id number]          ______________________________________________________________________  Assessment and Plan:   Patient Active Problem List   Diagnosis    Colitis    Spinal stenosis of cervical region    Hypertension    Pericarditis    Coronary artery disease involving native coronary artery of native heart    Leukocytosis    Asthma    Atherosclerosis of coronary artery    COPD with exacerbation ([facility]    Dyslipidemia    [facility]    Lactic acidosis    High anion gap metabolic acidosis    Acute kidney failure (HCC)    Transaminitis    Elevated troponin    Hypokalemia    Hyponatremia    Alcohol abuse    Tobacco abuse    Alcohol dependence with withdrawal (HCC)    Acute respiratory failure with hypoxia ([facility])    Bronchospasm, acute    Agitation    Heart failure, systolic, due to idiopathic cardiomyopathy ([facility] )       Neuro:   - Alert and following commands  Required no PRN overnight for agitation  Continue Serax 15 mg daily for acute alcohol withdrawal, CIWA protocol  Continue Thiamine and Folate supplementation         CV:   - Afib with RVR- Digoxin load is completed, level 1 6  Per cardiology recommendations, continue Amiodarone drip 0 5 mg, metoprolol 25 mg q12  Repeat ECHO when heart rate is controlled  Continue Eliquis 5 mg BID  Consider dose of Lasix today given pitting edema bilaterally  - History of CAD with inferior MR s/p RCA stent 2012- Aspirin and Atorvastatin restarted       Pulm:   - Acute hypoxic respiratory failure in the setting of acute COPD exacerbation- Successfully extubate  Continue Solumedrol 40 mg q8 with Xopenex and Atrovent q6, Pulmicort BID  Consider one time dose of Lasix this morning as above  Lipase improved to 563       GI:   - Free air visualized under diaphragm on chest XR tis AM- general surgery contacted and consulted   WBC increased this AM with elevated procalcitonin  Type and cross  - Elevated ammonia- Improved,continue Lactulose  - GI prophylaxis- Protonix 40 mg daily   - Bowel regimen- Multiple bowel movements, consider decreasing bowel regimen      :   - Urine output adequate  Will continue to monitor Is and Os closely       F/E/N:   - Dysphagia level 2 diet   - Replete electrolytes Mg >2 5, K >4 5     ID:   - WCB elevated with elevated procalcitonin, management as above       Heme:   - Thrombocytopenia- Platelets 89 this morning, slowly trending up  Will continue to monitor    - DVT prophylaxis- Heparin      Endo:   - Continue SSI with goal glucose <180, glucose consistently above goal                    Msk/Skin:   - Frequent repositioning, PT/OT when appropriate       Disposition: Continue ICU level care       Code Status: Level 1 - Full Code    ______________________________________________________________________    Chief Complaint: Abdominal pain, patient tearful     24 Hour Events: None, Precedex continued overnight and discontinued this AM  Tachycardic overnight         ______________________________________________________________________    Physical Exam:       Physical Exam   Constitutional: He is oriented to person, place, and time  He appears well-developed and well-nourished  No distress  HENT:   Head: Normocephalic and atraumatic  Neck: Normal range of motion  Neck supple  Cardiovascular: Normal rate, regular rhythm, normal heart sounds and intact distal pulses  No murmur heard  Pulmonary/Chest:   Mild increased work of breathing with diminished breath sounds at bases, coarse breath sounds with end-expiratory wheeze throughout  Abdominal: He exhibits distension  There is tenderness  There is no rebound and no guarding  Distended, tense abdomen, no rebound or guarding, tender diffusely    Musculoskeletal: He exhibits edema     +2 pitting edema bilaterally    Neurological: He is alert and oriented to person, place, and time  He exhibits normal muscle tone  Tremulous with arm extension    Skin: Skin is warm  No rash noted  He is not diaphoretic  No pallor  Psychiatric: He has a normal mood and affect  His behavior is normal  Thought content normal    Vitals reviewed  ______________________________________________________________________  Vitals:    18 2999 18 0344 18 0444 18 0614   BP: 134/91 139/90 132/89    BP Location:  Left arm     Pulse: (!) 106 104 98    Resp: 16 (!) 35 21    Temp:  98 9 °F (37 2 °C)     TempSrc:  Oral     SpO2: 96%  96% 97%   Weight:       Height:                  Temperature:   Temp (24hrs), Av °F (37 8 °C), Min:98 9 °F (37 2 °C), Max:101 1 °F (38 4 °C)    Current Temperature: 98 9 °F (37 2 °C)  Weights:   IBW: 63 8 kg    Body mass index is 30 81 kg/m²  Weight (last 2 days)     Date/Time   Weight    18 0600  86 6 (190 92)            Hemodynamic Monitoring:  N/A     Non-Invasive/Invasive Ventilation Settings:  Respiratory    Lab Data (Last 4 hours)    None         O2/Vent Data (Last 4 hours)    None              No results found for: PHART, WGB5JXA, PO2ART, YHT1DCL, A6YUCDOV, BEART, SOURCE  SpO2: SpO2: 97 %, SpO2 Activity: SpO2 Activity: At Rest, SpO2 Device: O2 Device: Nasal cannula, Capnography:    Intake and Outputs:  I/O       701 -  0700 701 -  0700    P  O   300    I V  (mL/kg) 936 5 (10 8) 452 2 (5 2)    NG/ 100    IV Piggyback 900 200    Feedings 1031 196    Total Intake(mL/kg) 3262 5 (37 7) 1248 2 (14 4)    Urine (mL/kg/hr) 1770 (0 9) 2120 (1)    Emesis/NG output 0 (0) 0 (0)    Stool  0 (0)    Total Output 1770 2120    Net +1492 5 -871 8          Unmeasured Urine Occurrence  1 x    Unmeasured Stool Occurrence  3 x        UOP: 180/hour   Nutrition:        Diet Orders            Start     Ordered    18 1542  Diet Dysphagia/Modified Consistency; Dysphagia 2-Mechanical Soft;  Thin Liquid; Consistent Carbohydrate Diet Level 2 (5 carb servings/75 grams CHO/meal)  Diet effective now     Question Answer Comment   Diet Type Dysphagia/Modified Consistency    Dysphagia/Modified Consistency Dysphagia 2-Mechanical Soft    Liquid Modifier Thin Liquid    Other Restriction(s): Consistent Carbohydrate Diet Level 2 (5 carb servings/75 grams CHO/meal)    RD to adjust diet per protocol? Yes        05/21/18 1542          Labs:     Results from last 7 days  Lab Units 05/22/18 0447 05/21/18 0425 05/20/18 0427 05/18/18  1909   WBC Thousand/uL 12 48* 7 06 10 15  --  15 07*   HEMOGLOBIN g/dL 11 3* 11 0* 10 9*  --  12 5   I STAT HEMOGLOBIN   --   --   --   < >  --    HEMATOCRIT % 36 3* 33 9* 32 8*  --  37 2   PLATELETS Thousands/uL 89* 86* 63*  --  76*   NEUTROS PCT %  --  71 83*  --  87*   MONOS PCT %  --  20* 10  --  7   MONO PCT MAN % 7  --   --   --   --    < > = values in this interval not displayed  Results from last 7 days  Lab Units 05/22/18 0447 05/21/18 0425 05/20/18 2209 05/19/18  0439   SODIUM mmol/L 140 144 142  < > 142   POTASSIUM mmol/L 4 6 3 9 4 1  < > 3 6   CHLORIDE mmol/L 103 105 104  < > 104   CO2 mmol/L 32 35* 35*  < > 32   BUN mg/dL 20 14 12  < > 11   CREATININE mg/dL 0 74 0 86 0 87  < > 0 77   CALCIUM mg/dL 7 6* 7 4* 7 1*  < > 6 7*   TOTAL PROTEIN g/dL 5 7* 5 0*  --   --  5 7*   BILIRUBIN TOTAL mg/dL 0 74 0 68  --   --  0 70   ALK PHOS U/L 115 131*  --   --  170*   ALT U/L 51 55  --   --  62   AST U/L 47* 50*  --   --  100*   GLUCOSE RANDOM mg/dL 112 124 211*  < > 182*   GLUCOSE, ISTAT   --   --   --   < >  --    < > = values in this interval not displayed      Results from last 7 days  Lab Units 05/22/18 0447 05/21/18 0425 05/20/18 2209   MAGNESIUM mg/dL 2 2 2 0 2 3     Lab Results   Component Value Date    PHOS 2 7 05/22/2018    PHOS 3 0 05/21/2018    PHOS 2 7 05/20/2018        Results from last 7 days  Lab Units 05/22/18  0447 05/21/18  0425 05/15/18  1305   INR  1 14 1 09 1 05   PTT seconds  --   --  27       0  Lab Value Date/Time   TROPONINI 0 12 (H) 05/20/2018 1753   TROPONINI 0 17 (H) 05/20/2018 1505   TROPONINI 0 17 (H) 05/20/2018 1214   TROPONINI 0 29 (H) 05/19/2018 0438   TROPONINI 0 30 (H) 05/19/2018 0154   TROPONINI 0 32 (H) 05/18/2018 2229   TROPONINI 0 32 (H) 05/18/2018 1909   TROPONINI 1 15 (H) 05/16/2018 1416   TROPONINI 1 24 (H) 05/16/2018 0522   TROPONINI 1 16 (H) 05/16/2018 0209   TROPONINI 1 09 (H) 05/16/2018 0053   TROPONINI 0 85 (H) 05/15/2018 1935   TROPONINI 0 72 (H) 05/15/2018 1728   TROPONINI 0 47 (H) 05/15/2018 1304   TROPONINI 0 02 12/07/2016 1931   TROPONINI 0 04 (H) 12/07/2016 0907   TROPONINI <0 04 05/22/2015 0123       Results from last 7 days  Lab Units 05/16/18  0722 05/16/18  0413 05/16/18  0209   LACTIC ACID mmol/L 1 6 2 8* 3 8*     ABG:  Lab Results   Component Value Date    PHART 7 385 05/19/2018    VPS0ZAK 49 5 (H) 05/19/2018    PO2ART 284 5 (H) 05/19/2018    SCW1NCC 29 0 (H) 05/19/2018    BEART 3 2 05/19/2018    SOURCE Radial, Right 05/19/2018     Imaging: On review of CXR this morning, free air visualized under diaphragm  I have personally reviewed pertinent reports  Micro:  Lab Results   Component Value Date    BLOODCX No Growth After 5 Days  05/15/2018    BLOODCX No Growth After 5 Days  05/15/2018     Allergies:    Allergies   Allergen Reactions    Other      Brown cloth band aids        Medications:   Scheduled Meds:  Current Facility-Administered Medications:  amiodarone 0 5 mg/min Intravenous Continuous Surekha Cochran PA-C Last Rate: 0 5 mg/min (05/21/18 1723)   apixaban 5 mg Oral BID Edgardo Shah MD    aspirin 81 mg Oral Daily Edgardo Shah MD    atorvastatin 40 mg Oral Daily With Yolanda Dhillon MD    bisacodyl 10 mg Rectal Daily PRN [facility], PA-C    budesonide 0 5 mg Nebulization Q12H Nyasia Serrato PA-C    famotidine 20 mg Oral BID SUZETTE Canela PA-C    folic acid IVPB 1 mg [facility]venous Daily [facility] Last Rate: Stopped (05/21/18 1004) insulin lispro 5-25 Units Subcutaneous Q6H [address] Pricila Ramirez PA-C    ipratropium        ipratropium 0 5 mg Nebulization Q6H Danyell Bratis, DO    levalbuterol        levalbuterol 1 25 mg Nebulization Q6H Danyell Bratis, DO    LORazepam 0 5 mg Intravenous Q2H PRN Merlynn Mik, DO    Or        LORazepam 1 mg Intravenous Q2H PRN Merlynn Mik, DO    Or        LORazepam 2 mg Intravenous Q2H PRN Merlynn Mik, DO    methylPREDNISolone sodium succinate 40 mg Intravenous [facility] AMALIA Engel    metoprolol 5 mg Intravenous Q6H PRN Pricila Ramirez PA-C    metoprolol tartrate 25 mg Oral Q12H [address] Pricila Ramirez PA-C    nicotine 14 mg Transdermal Daily AMALIA Engel    oxazepam 15 mg Oral Q6H [address] Brian Crowley MD    polyethylene glycol 17 g [facility]    senna 8 8 mg Oral HS Britni Canela PA-C    thiamine 100 mg Intravenous Daily [place], [place] Last Rate: Stopped (05/21/18 1004)     Continuous Infusions:  amiodarone 0 5 mg/min Last Rate: 0 5 mg/min (05/21/18 1723)     PRN Meds:    bisacodyl 10 mg Daily PRN   LORazepam 0 5 mg Q2H PRN   Or     LORazepam 1 mg Q2H PRN   Or     LORazepam 2 mg Q2H PRN   metoprolol 5 mg Q6H PRN     VTE Pharmacologic Prophylaxis: Heparin  VTE Mechanical Prophylaxis: sequential compression device  Invasive lines and devices:   Invasive Devices     Peripheral Intravenous Line            Peripheral IV 05/18/18 Right;Upper Arm 3 days    Peripheral IV 05/19/18 Left Arm 2 days    Peripheral IV 05/20/18 Right Hand 1 day          Drain            External Urinary Catheter Medium less than 1 day                     Merlynn Imk, DO

## 2018-05-22 NOTE — ED PROCEDURE NOTE
PROCEDURE  CriticalCare Time  Performed by: Corky Abebe  Authorized by: Migel Mendez, [address] care provider statement:     Critical care time (minutes):  40    Critical care time was exclusive of:  Separately billable procedures and treating other patients and teaching time    Critical care was necessary to treat or prevent imminent or life-threatening deterioration of the following conditions:  Circulatory failure and dehydration    Critical care was time spent personally by me on the following activities:  Obtaining history from patient or surrogate, development of treatment plan with patient or surrogate, discussions with consultants, evaluation of patient's response to treatment, examination of patient, ordering and review of laboratory studies, ordering and review of radiographic studies and re-evaluation of patient's condition    I assumed direction of critical care for this patient from another provider in my specialty: no           Wyatt Taylor MD  05/22/18 0489

## 2018-05-22 NOTE — OP NOTE
OPERATIVE REPORT  PATIENT NAME: Yung Carlson    :  1960  MRN: [id number]  Pt Location: BE OR ROOM 10    SURGERY DATE: 2018    Surgeon(s) and Role:     * Benjamin Thompson MD - Primary     * Jose Mejía - Assisting    Preop Diagnosis:  Free intraperitoneal air [K66 8]    Post-Op Diagnosis Codes:     * Free intraperitoneal air [K66 8]    Procedure(s) (LRB):  EXPLORATORY LAPAROTOMY, ILIOCOLECTOMY, ILIOCOLONIC ANASTAMOSIS, LOOP ILIOSTOMY, REPAIR OF SEROSAL TAIL, EXTENSIVE LYSIS OF ADHESIONS, WOUND VAC PLACEMENT (N/A)    Specimen(s):  ID Type Source Tests Collected by Time Destination   1 : Segment of Colon and Small Bowel Tissue Colon TISSUE EXAM Benjamin Thompson MD 2018 1204        Estimated Blood Loss:   550 mL    Drains:  Closed/Suction Drain Left LLQ Bulb 10 Fr  (Active)   Number of days: 0       Closed/Suction Drain Left LLQ Bulb 10 Fr  (Active)   Number of days: 0       Negative Pressure Wound Therapy (V A C ) Abdomen Mid (Active)   Black foam- # applied 1 2018  1:27 PM   Cycle Continuous 2018  1:27 PM   Target Pressure (mmHg) 125 2018  1:27 PM   Number of days: 0       NG/OG/Enteral Tube Nasogastric 10 Fr Right nares (Active)   Number of days: 0       Urethral Catheter Latex 16 Fr  (Active)   Number of days: 0       External Urinary Catheter Medium (Active)   Collection Container Standard drainage bag 2018  8:25 AM   Securement Method Tape 2018  8:25 AM   Output (mL) 220 mL 2018  3:44 AM   Number of days: 1       [REMOVED] NG/OG/Enteral Tube Orogastric 16 Fr Right mouth (Removed)   Placement Reverification Auscultation 2018  3:44 AM   Site Assessment Clean;Dry; Intact 2018  7:53 AM   Status Tube feed infusing 2018  7:53 AM   Drainage Appearance None 2018  7:53 AM   Intake (mL) 100 mL 2018 10:04 AM   Output (mL) 0 mL 2018 10:04 AM   Number of days: 2       [REMOVED] Rectal Tube With balloon (Removed)   Rectal Tube Output 0 mL 5/16/2018  3:30 AM   Number of days: 0       [REMOVED] Urethral Catheter 16 Fr  (Removed)   Reasons to continue Urinary Catheter  Accurate I&O assessment in critically ill patients (48 hr  max) 5/17/2018  6:24 AM   Goal for Removal Remove after 48 hrs of I/O monitoring 5/17/2018  6:24 AM   Site Assessment Clean;Skin intact 5/17/2018  6:01 AM   Collection Container Standard drainage bag 5/17/2018  6:01 AM   Securement Method Securing device (Describe) 5/17/2018  6:01 AM   Output (mL) 40 mL 5/17/2018 10:01 AM   Number of days: 2       [REMOVED] Urethral Catheter Temperature probe (Removed)   Reasons to continue Urinary Catheter  Accurate I&O assessment in critically ill patients (48 hr  max) 5/21/2018 10:03 AM   Site Assessment Clean;Skin intact 5/21/2018  7:53 AM   Collection Container Standard drainage bag 5/21/2018  7:53 AM   Securement Method Securing device (Describe) 5/21/2018  7:53 AM   Output (mL) 20 mL 5/21/2018 10:46 AM   Number of days: 2       [REMOVED] External Urinary Catheter Medium (Removed)   Collection Container Standard drainage bag 5/18/2018  8:00 AM   Securement Method Tape 5/18/2018  8:00 AM   Output (mL) 125 mL 5/18/2018 10:41 AM   Number of days: 1       Anesthesia Type:   General    Operative Indications:  Free intraperitoneal air [K66 8]    Operative Findings:  1 cm perforation in transverse colon, ischemia from proximal to mid transverse colon  NGT in stomach    Complications:   None    Procedure and Technique:  The patient was identified by name and armband in the preoperative holding area  He was taken to the operating room, where general anesthesia was induced, and an endotracheal tube was placed by the anesthesiology team  He was placed in the supine position  His abdomen was prepped and draped in the usual sterile fashion  A brief time out was called, and all in the room were in agreement  An upper midline laparotomy incision was made   The stomach was inspected for injury and was found to be normal and healthy  Small bowel was eviscerated through the incision, and gross spillage of stool was noted coming from the left upper quadrant  The incision was extended towards the pubis  Stool was found to be emanating from a 1 cm enterotomy in the mid transverse colon, which appeared distended and ischemic  A 3-0 silk suture was used to close the enterotomy to prevent further spillage  An extensive lysis of adhesions was performed using a combination of bovie electrocautery and sharp dissection in order to free the transverse colon and the previous ileocolonic anastomosis  A serosal injury to small bowel was made and repaired with a 3-0 PDS suture  The ileum appeared healthy and viable to the level of the previous ileocolonic anastomosis  The transverse colon distal to the anastomosis appeared distended and ischemic  Therefore, an ileocolonic resection was performed  Mesentery attached to the distal ileum and proximal 2/3 of the transverse colon was clamped, divided, and tied  Bovie electrocautery was used to make enterotomies in the healthy distal transverse colon and the distal ileum  A SAMUEL stapler with blue bowel load was used to create a side-to-side anastomosis  The bowel was then divided using a SAMUEL stapler with blue bowel load x2, and the specimen was removed from the field  An enterotomy was noted near the crotch of the anastomosis, and this was repaired using 3-0 PDS suture  The staple line was then oversewn using 3-0 PDS suture  The abdomen was irrigated using copious amounts of warm saline solution  Two 10-fr SANTA drains were place, one in the LUQ near the site of the anastomosis, and the other in the RLQ  An area in the RUQ was chosen for the ileostomy site and sharply excised down to the level of the fascia using bovie electrocautery  A cruciate incision was made in the fascia and then bluntly widened to the width of 3 fingers   Healthy distal ileum was then pulled through the abdominal wall without tension  The fascia was closed using #1 looped PDS  The wound was then irrigated using normal saline solution  Subcutaneous 3-0 interrupted vicryl sutures were placed and left untied  A single black VAC sponge was placed in the midline incision and bridged to the RLQ  VAC therapy was initiated at 125 mmHg with adequate seal     The ileostomy was then matured in a Dorcas fashion using 3-0 vicryl sutures  An ostomy bar was placed  Sponge and needle counts were correct  However, instrument counts were off with an extra needle  recorded  Therefore, an intraoperative XR was performed, which showed no retained foreign bodies, confirmed by Dr Som Meredith of radiology      Dr Femi Hdz was present for the entire procedure    Patient Disposition:  Critical Care Unit    SIGNATURE: Samira Ramirez  DATE: May 22, 2018  TIME: 2:33 PM

## 2018-05-22 NOTE — CONSULTS
H&P Exam - Colorectal Surgery   Amanda Bravo 56 y o  male MRN: [id number]  Unit/Bed#: [facility] 06 Encounter: [id number]    Assessment/Plan     Assessment:  27-year-old male with perforated viscus, unclear etiology, patient has history of Crohn's colitis and is status post previous right hemicolectomy  Patient has peritonitis on my exam, and clinically worsening    Plan:  Plan for laparotomy con procedures indicated  IV fluid resuscitation  Will likely require prolonged ICU stay Critical Care Management particularly in setting multiple comorbidities including but not limited to advanced COPD, coronary artery disease status post PCI and stent placement, medical coagulopathy secondary to Eliquis in setting of recent atrial fibrillation  I have discussed risks benefits and alternatives with patient, he agrees to proceed    History of Present Illness     HPI:  Amanda Bravo is a 56 y o  male who presents with 1 day of acute abdominal pain  Patient has been admitted for the past 7 days for encephalopathy as well as alcohol withdrawal   Patient was stabilized and subsequently a went into ETOH withdrawal and rapid AFib requiring cardioversion and intubation  Patient was extubated yesterday and states that over the past several months, he has had progressively worsening abdominal pain  He had acute worsening in the past day  He has become progressively more distended  Patient has history of inflammatory bowel disease that he claims is ulcerative colitis  He has a history of a right hemicolectomy based on previous imaging  He follows with Dr Fili Covarrubias of colorectal surgery who most recently performed a colonoscopy in February of 2018 that demonstrated findings significant for Crohn's pancolitis  Biopsy confirmed pathology  Has multiple comorbidities including coronary artery disease status post stent placement  He also has COPD  He was most recently started on Eliquis for new onset AFib    He last took Eliquis yesterday evening  Review of Systems  Complete 12 point review of systems was performed, all findings are negative unless otherwise noted by the HPI    Historical Information   Past Medical History:   Diagnosis Date    Asthma     Cardiac disease     Colitis     Colon polyps     [facility] )     Coronary artery disease     Diverticulitis     Hyperlipidemia     Hypertension     Myocardial infarction ([facility] )     Ulcerative colitis ([place] [address] )      Past Surgical History:   Procedure Laterality Date    ANGIOPLASTY      COLON SURGERY      COLONOSCOPY N/A 10/24/2016    Procedure: COLONOSCOPY;  Surgeon: Nereyda Yen MD;  Location: [facility]; Service:     CORONARY ANGIOPLASTY WITH STENT PLACEMENT      ESOPHAGOGASTRODUODENOSCOPY N/A 10/24/2016    Procedure: ESOPHAGOGASTRODUODENOSCOPY (EGD); Surgeon: Nereyda Yen MD;  Location: [facility]; Service:     [facility]/A 2/6/2018    Procedure: COLONOSCOPY;  Surgeon: Nereyda Yen MD;  Location: [facility];   Service: Colorectal    TONSILLECTOMY       Social History   History   Alcohol Use    Yes     Comment: kenji occassionally     History   Drug Use No     History   Smoking Status    Current Every Day Smoker    Types: Cigars   Smokeless Tobacco    Never Used     Family History: non-contributory    Meds/Allergies   all medications and allergies reviewed  Allergies   Allergen Reactions    Other      Brown cloth band aids          Objective   First Vitals:   Blood Pressure: (!) 62/36 (05/15/18 1239)  Pulse: (!) 107 (05/15/18 1239)  Temperature: 98 3 °F (36 8 °C) (05/15/18 1240)  Temp Source: Oral (05/15/18 1240)  Respirations: 22 (05/15/18 1239)  Height: 5' 6" (167 6 cm) (05/15/18 1239)  Weight - Scale: 65 8 kg (145 lb) (05/15/18 1239)  SpO2: 97 % (05/15/18 1239)    Current Vitals:   Blood Pressure: 132/89 (05/22/18 0444)  Pulse: 90 (05/22/18 0815)  Temperature: 98 9 °F (37 2 °C) (05/22/18 0344)  Temp Source: Oral (05/22/18 0344)  Respirations: 21 (05/22/18 0444)  Height: 5' 6" (167 6 cm) (05/17/18 1313)  Weight - Scale: 86 6 kg (190 lb 14 7 oz) (05/21/18 0600)  SpO2: 95 % (05/22/18 0815)      Intake/Output Summary (Last 24 hours) at 05/22/18 0857  Last data filed at 05/22/18 0601   Gross per 24 hour   Intake          1082 77 ml   Output             2085 ml   Net         -1002 23 ml       Invasive Devices     Peripheral Intravenous Line            Peripheral IV 05/18/18 Right;Upper Arm 3 days    Peripheral IV 05/19/18 Left Arm 2 days    Peripheral IV 05/20/18 Right Hand 1 day          Drain            External Urinary Catheter Medium less than 1 day                Physical Exam   Constitutional: He is oriented to person, place, and time  Diaphoretic   HENT:   Head: Normocephalic  Cardiovascular: Normal rate and regular rhythm  Pulmonary/Chest: He has no wheezes  Abdominal:   Distended, diffusely tender, on my exam he has peritonitis   Musculoskeletal: Normal range of motion  Neurological: He is alert and oriented to person, place, and time  Skin: Skin is warm and dry  Lab Results:   I have personally reviewed pertinent lab results    , CBC:   Lab Results   Component Value Date    WBC 12 48 (H) 05/22/2018    HGB 11 3 (L) 05/22/2018    HCT 36 3 (L) 05/22/2018     (H) 05/22/2018    PLT 89 (L) 05/22/2018    MCH 32 2 05/22/2018    MCHC 31 1 (L) 05/22/2018    RDW 17 3 (H) 05/22/2018    MPV 10 0 05/22/2018    NRBC 1 05/22/2018   , CMP:   Lab Results   Component Value Date     05/22/2018    K 4 6 05/22/2018     05/22/2018    CO2 32 05/22/2018    ANIONGAP 5 05/22/2018    BUN 20 05/22/2018    CREATININE 0 74 05/22/2018    GLUCOSE 112 05/22/2018    CALCIUM 7 6 (L) 05/22/2018    AST 47 (H) 05/22/2018    ALT 51 05/22/2018    ALKPHOS 115 05/22/2018    PROT 5 7 (L) 05/22/2018    BILITOT 0 74 05/22/2018    EGFR 104 05/22/2018     Imaging: I have personally reviewed pertinent films in PACS and I have personally reviewed pertinent films in PACS with a Radiologist   EKG, Pathology, and Other Studies: I have personally reviewed pertinent reports     and I have personally reviewed pertinent films in PACS    Code Status: Level 1 - Full Code  Advance Directive and Living Will:      Power of :    POLST:

## 2018-05-22 NOTE — ANESTHESIA POSTPROCEDURE EVALUATION
Post-Op Assessment Note      CV Status:  Stable    Post-procedure mental status: intubated, sedated  Hydration Status:  Stable    PONV status: intubated, sedated  Airway patency: intubated  Intubated: intubated      Post Op Vitals Reviewed: Yes          Staff: Anesthesiologist       Comments: intubated, sedated to MICU          BP      Temp      Pulse     Resp     SpO2

## 2018-05-23 ENCOUNTER — APPOINTMENT (INPATIENT)
Dept: RADIOLOGY | Facility: HOSPITAL | Age: 58
DRG: 710 | End: 2018-05-23
Payer: COMMERCIAL

## 2018-05-23 PROBLEM — E87.29 HIGH ANION GAP METABOLIC ACIDOSIS: Status: RESOLVED | Noted: 2018-05-15 | Resolved: 2018-05-23

## 2018-05-23 PROBLEM — R74.01 TRANSAMINITIS: Status: RESOLVED | Noted: 2018-05-15 | Resolved: 2018-05-23

## 2018-05-23 PROBLEM — R79.89 ELEVATED TROPONIN: Status: RESOLVED | Noted: 2018-05-15 | Resolved: 2018-05-23

## 2018-05-23 PROBLEM — E87.2 HIGH ANION GAP METABOLIC ACIDOSIS: Status: RESOLVED | Noted: 2018-05-15 | Resolved: 2018-05-23

## 2018-05-23 PROBLEM — E87.6 HYPOKALEMIA: Status: RESOLVED | Noted: 2018-05-15 | Resolved: 2018-05-23

## 2018-05-23 PROBLEM — R57.9 SHOCK (HCC): Status: RESOLVED | Noted: 2018-05-15 | Resolved: 2018-05-23

## 2018-05-23 PROBLEM — E87.20 LACTIC ACIDOSIS: Status: RESOLVED | Noted: 2018-05-15 | Resolved: 2018-05-23

## 2018-05-23 PROBLEM — E44.0 MODERATE PROTEIN-CALORIE MALNUTRITION (HCC): Status: ACTIVE | Noted: 2018-05-23

## 2018-05-23 PROBLEM — E87.2 LACTIC ACIDOSIS: Status: RESOLVED | Noted: 2018-05-15 | Resolved: 2018-05-23

## 2018-05-23 PROBLEM — R77.8 ELEVATED TROPONIN: Status: RESOLVED | Noted: 2018-05-15 | Resolved: 2018-05-23

## 2018-05-23 PROBLEM — J98.01 BRONCHOSPASM, ACUTE: Status: RESOLVED | Noted: 2018-05-19 | Resolved: 2018-05-23

## 2018-05-23 PROBLEM — E87.1 HYPONATREMIA: Status: RESOLVED | Noted: 2018-05-15 | Resolved: 2018-05-23

## 2018-05-23 PROBLEM — Z93.2 PRESENCE OF ILEOSTOMY (HCC): Status: ACTIVE | Noted: 2018-05-23

## 2018-05-23 LAB
ABO GROUP BLD BPU: NORMAL
ALBUMIN SERPL BCP-MCNC: 1.8 G/DL (ref 3.5–5)
ALP SERPL-CCNC: 61 U/L (ref 46–116)
ALT SERPL W P-5'-P-CCNC: 50 U/L (ref 12–78)
ANION GAP SERPL CALCULATED.3IONS-SCNC: 3 MMOL/L (ref 4–13)
APTT PPP: 29 SECONDS (ref 24–36)
AST SERPL W P-5'-P-CCNC: 40 U/L (ref 5–45)
ATRIAL RATE: 78 BPM
BASE EXCESS BLDA CALC-SCNC: 11 MMOL/L (ref -2–3)
BASE EXCESS BLDA CALC-SCNC: 9 MMOL/L (ref -2–3)
BASOPHILS # BLD MANUAL: 0 THOUSAND/UL (ref 0–0.1)
BASOPHILS NFR MAR MANUAL: 0 % (ref 0–1)
BILIRUB SERPL-MCNC: 0.97 MG/DL (ref 0.2–1)
BPU ID: NORMAL
BUN SERPL-MCNC: 20 MG/DL (ref 5–25)
CA-I BLD-SCNC: 0.93 MMOL/L (ref 1.12–1.32)
CA-I BLD-SCNC: 0.96 MMOL/L (ref 1.12–1.32)
CA-I BLD-SCNC: 1.02 MMOL/L (ref 1.12–1.32)
CALCIUM SERPL-MCNC: 7.4 MG/DL (ref 8.3–10.1)
CHLORIDE SERPL-SCNC: 105 MMOL/L (ref 100–108)
CO2 SERPL-SCNC: 34 MMOL/L (ref 21–32)
CREAT SERPL-MCNC: 0.69 MG/DL (ref 0.6–1.3)
DOHLE BOD BLD QL SMEAR: PRESENT
EOSINOPHIL # BLD MANUAL: 0 THOUSAND/UL (ref 0–0.4)
EOSINOPHIL NFR BLD MANUAL: 0 % (ref 0–6)
ERYTHROCYTE [DISTWIDTH] IN BLOOD BY AUTOMATED COUNT: 17.6 % (ref 11.6–15.1)
GFR SERPL CREATININE-BSD FRML MDRD: 107 ML/MIN/1.73SQ M
GLUCOSE SERPL-MCNC: 154 MG/DL (ref 65–140)
GLUCOSE SERPL-MCNC: 163 MG/DL (ref 65–140)
GLUCOSE SERPL-MCNC: 165 MG/DL (ref 65–140)
GLUCOSE SERPL-MCNC: 167 MG/DL (ref 65–140)
GLUCOSE SERPL-MCNC: 175 MG/DL (ref 65–140)
GLUCOSE SERPL-MCNC: 181 MG/DL (ref 65–140)
HCO3 BLDA-SCNC: 33.5 MMOL/L (ref 22–28)
HCO3 BLDA-SCNC: 34.5 MMOL/L (ref 22–28)
HCT VFR BLD AUTO: 32.3 % (ref 36.5–49.3)
HCT VFR BLD CALC: 21 % (ref 36.5–49.3)
HCT VFR BLD CALC: 27 % (ref 36.5–49.3)
HGB BLD-MCNC: 10.4 G/DL (ref 12–17)
HGB BLDA-MCNC: 7.1 G/DL (ref 12–17)
HGB BLDA-MCNC: 9.2 G/DL (ref 12–17)
INR PPP: 1.28 (ref 0.86–1.17)
LYMPHOCYTES # BLD AUTO: 1.02 THOUSAND/UL (ref 0.6–4.47)
LYMPHOCYTES # BLD AUTO: 9 % (ref 14–44)
MAGNESIUM SERPL-MCNC: 2.3 MG/DL (ref 1.6–2.6)
MCH RBC QN AUTO: 32.4 PG (ref 26.8–34.3)
MCHC RBC AUTO-ENTMCNC: 32.2 G/DL (ref 31.4–37.4)
MCV RBC AUTO: 101 FL (ref 82–98)
MONOCYTES # BLD AUTO: 0 THOUSAND/UL (ref 0–1.22)
MONOCYTES NFR BLD: 0 % (ref 4–12)
NEUTROPHILS # BLD MANUAL: 9.93 THOUSAND/UL (ref 1.85–7.62)
NEUTS BAND NFR BLD MANUAL: 15 % (ref 0–8)
NEUTS SEG NFR BLD AUTO: 73 % (ref 43–75)
NRBC BLD AUTO-RTO: 0 /100 WBCS
P AXIS: 39 DEGREES
PCO2 BLD: 35 MMOL/L (ref 21–32)
PCO2 BLD: 36 MMOL/L (ref 21–32)
PCO2 BLD: 42.8 MM HG (ref 36–44)
PCO2 BLD: 43.2 MM HG (ref 36–44)
PH BLD: 7.5 [PH] (ref 7.35–7.45)
PH BLD: 7.51 [PH] (ref 7.35–7.45)
PHOSPHATE SERPL-MCNC: 2.7 MG/DL (ref 2.7–4.5)
PLATELET # BLD AUTO: 151 THOUSANDS/UL (ref 149–390)
PLATELET BLD QL SMEAR: ADEQUATE
PMV BLD AUTO: 9.7 FL (ref 8.9–12.7)
PO2 BLD: 110 MM HG (ref 75–129)
PO2 BLD: 80 MM HG (ref 75–129)
POTASSIUM BLD-SCNC: 4.6 MMOL/L (ref 3.5–5.3)
POTASSIUM BLD-SCNC: 4.8 MMOL/L (ref 3.5–5.3)
POTASSIUM SERPL-SCNC: 5.1 MMOL/L (ref 3.5–5.3)
PR INTERVAL: 121 MS
PROT SERPL-MCNC: 4.7 G/DL (ref 6.4–8.2)
PROTHROMBIN TIME: 16.1 SECONDS (ref 11.8–14.2)
QRS AXIS: 80 DEGREES
QRSD INTERVAL: 96 MS
QT INTERVAL: 354 MS
QTC INTERVAL: 404 MS
RBC # BLD AUTO: 3.21 MILLION/UL (ref 3.88–5.62)
SAO2 % BLD FROM PO2: 97 % (ref 95–98)
SAO2 % BLD FROM PO2: 99 % (ref 95–98)
SODIUM BLD-SCNC: 140 MMOL/L (ref 136–145)
SODIUM BLD-SCNC: 141 MMOL/L (ref 136–145)
SODIUM SERPL-SCNC: 142 MMOL/L (ref 136–145)
SPECIMEN SOURCE: ABNORMAL
SPECIMEN SOURCE: ABNORMAL
T WAVE AXIS: 31 DEGREES
TOTAL CELLS COUNTED SPEC: 100
TOXIC GRANULES BLD QL SMEAR: PRESENT
UNIT DISPENSE STATUS: NORMAL
UNIT PRODUCT CODE: NORMAL
UNIT RH: NORMAL
VARIANT LYMPHS # BLD AUTO: 3 %
VENTRICULAR RATE: 78 BPM
WBC # BLD AUTO: 11.28 THOUSAND/UL (ref 4.31–10.16)

## 2018-05-23 PROCEDURE — 82948 REAGENT STRIP/BLOOD GLUCOSE: CPT

## 2018-05-23 PROCEDURE — 85007 BL SMEAR W/DIFF WBC COUNT: CPT | Performed by: FAMILY MEDICINE

## 2018-05-23 PROCEDURE — 85610 PROTHROMBIN TIME: CPT | Performed by: FAMILY MEDICINE

## 2018-05-23 PROCEDURE — 82330 ASSAY OF CALCIUM: CPT | Performed by: FAMILY MEDICINE

## 2018-05-23 PROCEDURE — 94003 VENT MGMT INPAT SUBQ DAY: CPT

## 2018-05-23 PROCEDURE — 93005 ELECTROCARDIOGRAM TRACING: CPT

## 2018-05-23 PROCEDURE — 93010 ELECTROCARDIOGRAM REPORT: CPT | Performed by: INTERNAL MEDICINE

## 2018-05-23 PROCEDURE — 80053 COMPREHEN METABOLIC PANEL: CPT | Performed by: FAMILY MEDICINE

## 2018-05-23 PROCEDURE — 94640 AIRWAY INHALATION TREATMENT: CPT

## 2018-05-23 PROCEDURE — 94760 N-INVAS EAR/PLS OXIMETRY 1: CPT

## 2018-05-23 PROCEDURE — 71045 X-RAY EXAM CHEST 1 VIEW: CPT

## 2018-05-23 PROCEDURE — 99232 SBSQ HOSP IP/OBS MODERATE 35: CPT | Performed by: INTERNAL MEDICINE

## 2018-05-23 PROCEDURE — 85027 COMPLETE CBC AUTOMATED: CPT | Performed by: FAMILY MEDICINE

## 2018-05-23 PROCEDURE — 85730 THROMBOPLASTIN TIME PARTIAL: CPT | Performed by: FAMILY MEDICINE

## 2018-05-23 PROCEDURE — 83735 ASSAY OF MAGNESIUM: CPT | Performed by: FAMILY MEDICINE

## 2018-05-23 PROCEDURE — 84100 ASSAY OF PHOSPHORUS: CPT | Performed by: FAMILY MEDICINE

## 2018-05-23 PROCEDURE — C9113 INJ PANTOPRAZOLE SODIUM, VIA: HCPCS | Performed by: PHYSICIAN ASSISTANT

## 2018-05-23 PROCEDURE — 99291 CRITICAL CARE FIRST HOUR: CPT | Performed by: INTERNAL MEDICINE

## 2018-05-23 RX ORDER — FUROSEMIDE 10 MG/ML
40 INJECTION INTRAMUSCULAR; INTRAVENOUS ONCE
Status: COMPLETED | OUTPATIENT
Start: 2018-05-23 | End: 2018-05-23

## 2018-05-23 RX ORDER — ACETAMINOPHEN 650 MG/1
650 SUPPOSITORY RECTAL EVERY 6 HOURS PRN
Status: DISCONTINUED | OUTPATIENT
Start: 2018-05-23 | End: 2018-05-25

## 2018-05-23 RX ORDER — METHYLPREDNISOLONE SODIUM SUCCINATE 40 MG/ML
40 INJECTION, POWDER, LYOPHILIZED, FOR SOLUTION INTRAMUSCULAR; INTRAVENOUS EVERY 12 HOURS SCHEDULED
Status: DISCONTINUED | OUTPATIENT
Start: 2018-05-23 | End: 2018-05-24

## 2018-05-23 RX ORDER — METHYLPREDNISOLONE SODIUM SUCCINATE 40 MG/ML
40 INJECTION, POWDER, LYOPHILIZED, FOR SOLUTION INTRAMUSCULAR; INTRAVENOUS EVERY 12 HOURS SCHEDULED
Status: DISCONTINUED | OUTPATIENT
Start: 2018-05-23 | End: 2018-05-23

## 2018-05-23 RX ORDER — HEPARIN SODIUM 5000 [USP'U]/ML
5000 INJECTION, SOLUTION INTRAVENOUS; SUBCUTANEOUS EVERY 8 HOURS SCHEDULED
Status: DISCONTINUED | OUTPATIENT
Start: 2018-05-23 | End: 2018-06-01 | Stop reason: HOSPADM

## 2018-05-23 RX ORDER — METHYLPREDNISOLONE SODIUM SUCCINATE 40 MG/ML
40 INJECTION, POWDER, LYOPHILIZED, FOR SOLUTION INTRAMUSCULAR; INTRAVENOUS DAILY
Status: DISCONTINUED | OUTPATIENT
Start: 2018-05-23 | End: 2018-05-23

## 2018-05-23 RX ORDER — CALCIUM CHLORIDE 100 MG/ML
1 INJECTION INTRAVENOUS; INTRAVENTRICULAR ONCE
Status: DISCONTINUED | OUTPATIENT
Start: 2018-05-23 | End: 2018-05-23

## 2018-05-23 RX ADMIN — IPRATROPIUM BROMIDE 0.5 MG: 0.5 SOLUTION RESPIRATORY (INHALATION) at 22:31

## 2018-05-23 RX ADMIN — INSULIN LISPRO 5 UNITS: 100 INJECTION, SOLUTION INTRAVENOUS; SUBCUTANEOUS at 00:01

## 2018-05-23 RX ADMIN — METHYLPREDNISOLONE SODIUM SUCCINATE 40 MG: 40 INJECTION, POWDER, FOR SOLUTION INTRAMUSCULAR; INTRAVENOUS at 05:16

## 2018-05-23 RX ADMIN — Medication 5 MG: at 03:43

## 2018-05-23 RX ADMIN — THIAMINE HYDROCHLORIDE 100 MG: 100 INJECTION, SOLUTION INTRAMUSCULAR; INTRAVENOUS at 08:03

## 2018-05-23 RX ADMIN — SODIUM PHOSPHATE, MONOBASIC, MONOHYDRATE 12 MMOL: 276; 142 INJECTION, SOLUTION INTRAVENOUS at 10:38

## 2018-05-23 RX ADMIN — HEPARIN SODIUM 5000 UNITS: 5000 INJECTION, SOLUTION INTRAVENOUS; SUBCUTANEOUS at 14:27

## 2018-05-23 RX ADMIN — Medication 5 MG: at 21:42

## 2018-05-23 RX ADMIN — ACETAMINOPHEN 650 MG: 650 SUPPOSITORY RECTAL at 00:38

## 2018-05-23 RX ADMIN — PANTOPRAZOLE SODIUM 40 MG: 40 INJECTION, POWDER, FOR SOLUTION INTRAVENOUS at 05:12

## 2018-05-23 RX ADMIN — LEVALBUTEROL HYDROCHLORIDE 1.25 MG: 1.25 SOLUTION, CONCENTRATE RESPIRATORY (INHALATION) at 13:59

## 2018-05-23 RX ADMIN — IPRATROPIUM BROMIDE 0.5 MG: 0.5 SOLUTION RESPIRATORY (INHALATION) at 13:59

## 2018-05-23 RX ADMIN — Medication 0.3 MCG/KG/HR: at 18:14

## 2018-05-23 RX ADMIN — INSULIN LISPRO 5 UNITS: 100 INJECTION, SOLUTION INTRAVENOUS; SUBCUTANEOUS at 18:10

## 2018-05-23 RX ADMIN — ALBUTEROL SULFATE 2.5 MG: 2.5 SOLUTION RESPIRATORY (INHALATION) at 17:38

## 2018-05-23 RX ADMIN — HEPARIN SODIUM 5000 UNITS: 5000 INJECTION, SOLUTION INTRAVENOUS; SUBCUTANEOUS at 09:24

## 2018-05-23 RX ADMIN — METHYLPREDNISOLONE SODIUM SUCCINATE 40 MG: 40 INJECTION, POWDER, FOR SOLUTION INTRAMUSCULAR; INTRAVENOUS at 10:34

## 2018-05-23 RX ADMIN — NICOTINE 14 MG: 14 PATCH, EXTENDED RELEASE TRANSDERMAL at 08:08

## 2018-05-23 RX ADMIN — SODIUM CHLORIDE 100 ML/HR: 0.9 INJECTION, SOLUTION INTRAVENOUS at 01:00

## 2018-05-23 RX ADMIN — Medication 5 MG: at 15:52

## 2018-05-23 RX ADMIN — HEPARIN SODIUM 5000 UNITS: 5000 INJECTION, SOLUTION INTRAVENOUS; SUBCUTANEOUS at 21:42

## 2018-05-23 RX ADMIN — DEXMEDETOMIDINE HYDROCHLORIDE 0.4 MCG/KG/HR: 100 INJECTION, SOLUTION INTRAVENOUS at 04:00

## 2018-05-23 RX ADMIN — CALCIUM CHLORIDE 1 G: 100 INJECTION INTRAVENOUS; INTRAVENTRICULAR at 09:31

## 2018-05-23 RX ADMIN — Medication 3.38 G: at 03:39

## 2018-05-23 RX ADMIN — BUDESONIDE 0.5 MG: 0.5 INHALANT RESPIRATORY (INHALATION) at 07:37

## 2018-05-23 RX ADMIN — LEVALBUTEROL HYDROCHLORIDE 1.25 MG: 1.25 SOLUTION, CONCENTRATE RESPIRATORY (INHALATION) at 07:38

## 2018-05-23 RX ADMIN — CHLORHEXIDINE GLUCONATE 15 ML: 1.2 RINSE ORAL at 08:08

## 2018-05-23 RX ADMIN — IPRATROPIUM BROMIDE 0.5 MG: 0.5 SOLUTION RESPIRATORY (INHALATION) at 00:36

## 2018-05-23 RX ADMIN — LEVALBUTEROL HYDROCHLORIDE 1.25 MG: 1.25 SOLUTION, CONCENTRATE RESPIRATORY (INHALATION) at 22:31

## 2018-05-23 RX ADMIN — HYDROMORPHONE HYDROCHLORIDE 0.5 MG: 1 INJECTION, SOLUTION INTRAMUSCULAR; INTRAVENOUS; SUBCUTANEOUS at 01:30

## 2018-05-23 RX ADMIN — INSULIN LISPRO 5 UNITS: 100 INJECTION, SOLUTION INTRAVENOUS; SUBCUTANEOUS at 05:37

## 2018-05-23 RX ADMIN — METHYLPREDNISOLONE SODIUM SUCCINATE 40 MG: 40 INJECTION, POWDER, FOR SOLUTION INTRAMUSCULAR; INTRAVENOUS at 21:42

## 2018-05-23 RX ADMIN — HYDROMORPHONE HYDROCHLORIDE 0.5 MG: 1 INJECTION, SOLUTION INTRAMUSCULAR; INTRAVENOUS; SUBCUTANEOUS at 05:00

## 2018-05-23 RX ADMIN — FUROSEMIDE 40 MG: 10 INJECTION, SOLUTION INTRAMUSCULAR; INTRAVENOUS at 17:42

## 2018-05-23 RX ADMIN — INSULIN LISPRO 5 UNITS: 100 INJECTION, SOLUTION INTRAVENOUS; SUBCUTANEOUS at 12:04

## 2018-05-23 RX ADMIN — Medication 3.38 G: at 15:51

## 2018-05-23 RX ADMIN — LEVALBUTEROL HYDROCHLORIDE 1.25 MG: 1.25 SOLUTION, CONCENTRATE RESPIRATORY (INHALATION) at 00:36

## 2018-05-23 RX ADMIN — Medication 3.38 G: at 21:42

## 2018-05-23 RX ADMIN — Medication 0.4 MCG/KG/HR: at 10:22

## 2018-05-23 RX ADMIN — Medication 3.38 G: at 08:03

## 2018-05-23 RX ADMIN — FOLIC ACID 1 MG: 5 INJECTION, SOLUTION INTRAMUSCULAR; INTRAVENOUS; SUBCUTANEOUS at 09:09

## 2018-05-23 RX ADMIN — IPRATROPIUM BROMIDE 0.5 MG: 0.5 SOLUTION RESPIRATORY (INHALATION) at 07:38

## 2018-05-23 RX ADMIN — Medication 5 MG: at 09:26

## 2018-05-23 NOTE — PROGRESS NOTES
Progress Note - Colorectal Surgery   Laurent Osgood 56 y o  male MRN: [id number]  Unit/Bed#: [facility] 06 Encounter: [id number]    Assessment:  57 yo M with perforated transverse colon 2/2 bowel ischemia s/p ex lap, ileocolectomy with primary anastomosis, diverting ileostomy, fascial closure, SANTA x2 drain placement 5/22    Plan:  - NPO/NGT  - monitor ostomy -- appears dusky this AM  - possible extubation per MICU  - continue IVF resuscitation  - avoid hypotension  - analgesia per MICU  - continue antibiotics -- gross spillage during OR  - continue steroids -- received stress dose in OR  - plan for VAC takedown and skin closure 5/25  - DVT ppx -- rec SQH while off eliquis    Subjective/Objective     Subjective: Patient agitated  Per nursing, off propofol for hypotension and received 500 cc bolus overnight      Objective:     Vitals: Blood pressure (!) 85/53, pulse 72, temperature 100 °F (37 8 °C), temperature source Rectal, resp  rate 16, height 5' 6" (1 676 m), weight 89 6 kg (197 lb 8 5 oz), SpO2 96 %  ,Body mass index is 31 88 kg/m²  I/O       05/21 0701 - 05/22 0700 05/22 0701 - 05/23 0700    P  O  300     I V  (mL/kg) 452 2 (5 2) 6211 3 (69 3)    Blood  950    NG/     IV Piggyback 200 845    Feedings 196     Total Intake(mL/kg) 1248 2 (14 4) 8006 3 (89 4)    Urine (mL/kg/hr) 2120 (1) 1145 (0 5)    Emesis/NG output 0 (0) 170 (0 1)    Drains  265 (0 1)    Stool 0 (0) 175 (0 1)    Blood  550 (0 3)    Total Output 2120 2305    Net -871 8 +5701 3          Unmeasured Urine Occurrence 1 x 1 x    Unmeasured Stool Occurrence 3 x 1 x          Physical Exam:  GEN: critically ill  HEENT: MMM  CV: normal rate, on amio gtt  Lung: Normal effort  Ab: Soft, NT/ND, ostomy dusky with bloody drainage  Extrem: No CCE  Neuro: agitated    Lab, Imaging and other studies:   CBC with diff:   Lab Results   Component Value Date    WBC 11 28 (H) 05/23/2018    HGB 10 4 (L) 05/23/2018    HCT 32 3 (L) 05/23/2018     (H) 05/23/2018  05/23/2018    MCH 32 4 05/23/2018    MCHC 32 2 05/23/2018    RDW 17 6 (H) 05/23/2018    MPV 9 7 05/23/2018    NRBC 1 05/22/2018    NRBC 1 05/22/2018   , BMP/CMP:   Lab Results   Component Value Date     05/23/2018    K 5 1 05/23/2018     05/23/2018    CO2 34 (H) 05/23/2018    ANIONGAP 3 (L) 05/23/2018    BUN 20 05/23/2018    CREATININE 0 69 05/23/2018    GLUCOSE 175 (H) 05/23/2018    CALCIUM 7 4 (L) 05/23/2018    AST 40 05/23/2018    ALT 50 05/23/2018    ALKPHOS 61 05/23/2018    PROT 4 7 (L) 05/23/2018    BILITOT 0 97 05/23/2018    EGFR 107 05/23/2018   , Magnesium: No results found for: MAG  VTE Pharmacologic Prophylaxis: Sequential compression device (Venodyne)   VTE Mechanical Prophylaxis: sequential compression device

## 2018-05-23 NOTE — NUTRITION
Recommend Std TPN Day #1  On Day #2 recommend 600 ml (15% AA), 640 ml (D50W), 350 ml (20% Lipids) to provide qd:1590 ml,2148 Total Kcal,1788  NPC,90 gm PRO,320 gm CHO,70 gm Fat,14 4 gm N,124:1 (NPC:N), 2 5 mg CHO/kg/min = 17% PRO,51% CHO,32% Fat Kcal mix  Monitor for refeeding syndrome

## 2018-05-23 NOTE — PROGRESS NOTES
Cardiology Progress Note - Zenon Owen 56 y o  male MRN: [id number]    Unit/Bed#: [facility] 06 Encounter: [id number]      Assessment:  Principal Problem:    Acute respiratory failure with hypoxia [facility])  Active Problems:    Colitis    Coronary artery disease involving native coronary artery of native heart    COPD with exacerbation ([facility] )    Acute kidney failure ([facility] )    Alcohol abuse    Tobacco abuse    Alcohol dependence with withdrawal ([facility] )    Agitation    Heart failure, systolic, due to idiopathic cardiomyopathy ([place] [address] )    Free intraperitoneal air    Presence of ileostomy ([facility] )    Moderate protein-calorie malnutrition (H[facility])    Assessment and plan     1  Atrial fibrillation with RVR:  Now sinus rhythm  Continue with amiodarone drip till the patient can take oral medications  Restart beta-blockers when able  Atrial fibrillation likely in the setting of alcohol withdrawal and abdominal issues  Keep potassium more than 4 5, Mg > 2 5  Eendn7sfew score is 2,  restart anticoagulation with Eliquis 5 mg b i d when cleared by surgery  2  New onset cardiomyopathy - Echocardiogram on 05/19/2018 revealed a new drop in LVEF 25% with diffuse hypokinesis with regional variations, this is new compared to 2016  Could be related to tachycardia Afib versus alcoholic cardiomyopathy versus secondary to acute sepsis/abdominal issues  On EKG he has old septal Q-waves and minimal ST depressions in the inferior leads  Mild troponin elevation related to demand ischemia  Repeat a limited echo  for function after acute medical issues have resolved  3 History of CAD: Inferior MI status post RCA stent in 2012  Cardiac catheterization in 2016 for inferior ST elevations revealed patent RCA stent  Was treated for pericarditis with colchicine and aspirin at this time  Restart aspirin, atorvastatin when able to take oral medications  He is noncompliant with his medications     4   Transverse colon perforation - underwent Ex-lap, ileocolectomy, iliocolonic anastamosis, EDITH, loop iliostomy and wound vac placement  On IV antibiotic  4  Alcohol abuse:  Being treated for alcohol withdrawal with DTs   5  Hypertension:  BP controlled  6  Hyperlipidemia:On atorvastatin 40 HS  7  History of Crohn's disease  8  Mild aortic stenosis    Subjective:   Patient seen and examined bedside  He was found to have free air on his chest x-ray yesterday and was taken to OR immediately  He underwent an exploratory laparotomy and had a transverse colon perforation with ischemia  He underwent Ex-lap, ileocolectomy, iliocolonic anastamosis, EDITH, loop iliostomy and wound vac placement  Telemetry:  Normal sinus rhythm  No episodes of AFib overnight  Objective:     Vitals: Blood pressure 103/68, pulse 74, temperature 99 3 °F (37 4 °C), temperature source Oral, resp  rate 19, height 5' 6" (1 676 m), weight 89 6 kg (197 lb 8 5 oz), SpO2 95 %  , Body mass index is 31 88 kg/m² ,   Orthostatic Blood Pressures      Most Recent Value   Blood Pressure  103/68 filed at 05/23/2018 0750   Patient Position - Orthostatic VS  Lying filed at 05/23/2018 0750            Intake/Output Summary (Last 24 hours) at 05/23/18 1045  Last data filed at 05/23/18 0954   Gross per 24 hour   Intake          8518 31 ml   Output             2750 ml   Net          5768  31 ml     Physical Exam:    GEN: Awake  HEENT: anicteric, mucous membranes moist  NECK:  Unable to appreciate JVP  HEART:  Regular, no murmur heard  LUNGS:  Decreased breath sound at lung bases  ABDOMEN: normal bowel sounds, soft  EXTREMITIES:  1+ edema  NEURO:  Awake and oriented    Current Facility-Administered Medications:     acetaminophen (TYLENOL) rectal suppository 650 mg, 650 mg, Rectal, Q6H PRN, Vasiliy Bennett MD, 650 mg at 05/23/18 0038    amiodarone (CORDARONE) 900 mg in dextrose 5 % 500 mL infusion, 0 5 mg/min, Intravenous, Continuous, Deb Nath PA-C, Last Rate: 16 7 mL/hr at 05/22/18 2215, 0 5 mg/min at 05/22/18 2215    atorvastatin (LIPITOR) tablet 40 mg, 40 mg, Oral, Daily With Arianne Hernandez MD, Stopped at 05/22/18 1614    budesonide (PULMICORT) inhalation solution 0 5 mg, 0 5 mg, Nebulization, Q12H, Brianna Vides PA-C, 0 5 mg at 05/23/18 0737    dexmedetomidine (PRECEDEX) 200 mcg in sodium chloride 0 9 % 50 mL infusion, 0 1-0 7 mcg/kg/hr, Intravenous, Titrated, Aura Colon PA-C, Last Rate: 9 mL/hr at 05/23/18 1022, 0 4 mcg/kg/hr at 05/23/18 1022    diazepam (VALIUM) injection 5 mg, 5 mg, Intravenous, Q6H, Gardenia Bowens DO, 5 mg at 42/12/00 1381    folic acid 1 mg in sodium chloride 0 9 % 50 mL IVPB, 1 mg, Intravenous, Daily, EBONY DAILY, Stopped at 05/23/18 5191    heparin (porcine) subcutaneous injection 5,000 Units, 5,000 Units, Subcutaneous, Q8H [address], Aura Colon PA-C, 5,000 Units at 05/23/18 0924    HYDROmorphone (DILAUDID) injection 0 5 mg, 0 5 mg, Intravenous, Q1H PRN, EBONY DAILY, 0 5 mg at 05/23/18 0500    insulin lispro (HumaLOG) 100 units/mL subcutaneous injection 5-25 Units, 5-25 Units, Subcutaneous, Q6H [address], 5 Units at 05/23/18 0537 **AND** Fingerstick Glucose (POCT), , , Q6H, Perlita Shetty PA-C    ipratropium (ATROVENT) 0 02 % inhalation solution 0 5 mg, 0 5 mg, Nebulization, Q6H, Danyell Keri DO, 0 5 mg at 05/23/18 0738    levalbuterol (XOPENEX) inhalation solution 1 25 mg, 1 25 mg, Nebulization, Q6H, Danyell Keri DO, 1 25 mg at 05/23/18 0738    LORazepam (ATIVAN) 2 mg/mL injection 0 5 mg, 0 5 mg, Intravenous, Q2H PRN **OR** LORazepam (ATIVAN) 2 mg/mL injection 1 mg, 1 mg, Intravenous, Q2H PRN **OR** LORazepam (ATIVAN) 2 mg/mL injection 2 mg, 2 mg, Intravenous, Q2H PRN, Gardenia Bowens DO    methylPREDNISolone sodium succinate (Solu-MEDROL) injection 40 mg, 40 mg, Intravenous, Q12H [facility], Aura Colon PA-C, 40 mg at 05/23/18 1034    metoprolol (LOPRESSOR) injection 5 mg, 5 mg, Intravenous, Q6H PRN, Perlita Shetty PA-C, 5 mg at 05/19/18 1152    nicotine (NICODERM CQ) 14 mg/24hr TD 24 hr patch 14 mg, 14 mg, Transdermal, Daily, AMALIA Cisneros, 14 mg at 05/23/18 0808    pantoprazole (PROTONIX) injection 40 mg, 40 mg, Intravenous, Early Morning, Britni Canela PA-C, 40 mg at 05/23/18 0512    piperacillin-tazobactam (ZOSYN) 3 375 g in sodium chloride 0 9 % 50 mL IVPB, 3 375 g, Intravenous, Q6H, Gissel Martinez DO, Stopped at 05/23/18 0954    sodium phosphate 12 mmol in dextrose 5 % 250 mL Infusion, 12 mmol, Intravenous, Once, Gissel Martinez DO, Last Rate: 62 5 mL/hr at 05/23/18 1038, 12 mmol at 05/23/18 1038    thiamine (VITAMIN B1) 100 mg in sodium chloride 0 9 % 50 mL IVPB, 100 mg, Intravenous, Daily, EBONY DAILY, Stopped at 05/23/18 0954    Labs & Results:    Lab Results   Component Value Date    CKTOTAL 295 05/15/2018    CKMBINDEX 3 1 (H) 05/15/2018    TROPONINI 0 12 (H) 05/20/2018    TROPONINI 0 17 (H) 05/20/2018    TROPONINI 0 17 (H) 05/20/2018       Lab Results   Component Value Date    GLUCOSE 175 (H) 05/23/2018    CALCIUM 7 4 (L) 05/23/2018     05/23/2018    K 5 1 05/23/2018    CO2 34 (H) 05/23/2018     05/23/2018    BUN 20 05/23/2018    CREATININE 0 69 05/23/2018       Lab Results   Component Value Date    WBC 11 28 (H) 05/23/2018    HGB 10 4 (L) 05/23/2018    HCT 32 3 (L) 05/23/2018     (H) 05/23/2018     05/23/2018       Results from last 7 days  Lab Units 05/23/18  0445   INR  1 28*       Lab Results   Component Value Date    CHOL 145 05/20/2018    CHOL 131 12/08/2016     Lab Results   Component Value Date    HDL 67 (H) 05/20/2018    HDL 60 12/08/2016     Lab Results   Component Value Date    LDLCALC 44 05/20/2018    LDLCALC 47 12/08/2016     Lab Results   Component Value Date    TRIG 171 (H) 05/20/2018    TRIG 120 12/08/2016       Lab Results   Component Value Date    ALT 50 05/23/2018    AST 40 05/23/2018

## 2018-05-23 NOTE — PROGRESS NOTES
Progress Note - Critical Care   Kevin Gonzalez 56 y o  male MRN: [id number]  Unit/Bed#: MICU 06 Encounter: [id number]          ______________________________________________________________________  Assessment and Plan:   Patient Active Problem List   Diagnosis    Colitis    Spinal stenosis of cervical region    Hypertension    Pericarditis    Coronary artery disease involving native coronary artery of native heart    Leukocytosis    Asthma    Atherosclerosis of coronary artery    COPD with exacerbation (HCC)    Dyslipidemia    Shock ([facility] [address] )    Lactic acidosis    High anion gap metabolic acidosis    Acute kidney failure (HCC)    Transaminitis    Elevated troponin    Hypokalemia    Hyponatremia    Alcohol abuse    Tobacco abuse    Alcohol dependence with withdrawal (HCC)    Acute [facility] failure with hypoxia ([facility]    Bronchospasm, acute    Agitation    Heart failure, systolic, due to idiopathic cardiomyopathy ([facility] [address] )    Free intraperitoneal air    [facility]       Neuro:   - Intubated but alert and following commands, Propofol stopped, Precedex 0 4  Required no PRN overnight for agitation  Continue Valium 5 mg every 6 hours, CIWA protocol  Continue IV Thiamine and Folate supplementation         CV:   - Afib with RVR- Digoxin load is completed, level 1 6  Per cardiology recommendations, continue Amiodarone drip 0 5 mg, metoprolol 25 mg q12 held given NPO  Repeat ECHO when heart rate is controlled in 2-3 weeks per cardiology  Continue Eliquis 5 mg BID held starting yesterday, consider restarting Heparin today  - History of CAD with inferior MR s/p RCA stent 2012- Aspirin and Atorvastatin held given NPO       Pulm:   - Acute hypoxic respiratory failure in the setting of acute COPD exacerbation- Re-intubated yesterday for procedure, currently AC/VC 16/450/50%/5   Continue Solumedrol 40 mg q8 with Xopenex and Atrovent q6, Pulmicort BID        GI:   - Post-op day 1 following exploratory laparotomy with ileocolectomy and loop ileostomy- Continue routine post-operative care per general surgery, blood present in ostomy bag this morning, will continue to monitor  Continue Zosyn q6  Plan for [facility] takedown and skin closure 5/25  - Elevated ammonia- Improved,Lactulose discontinued   - GI prophylaxis- Protonix 40 mg daily   - Bowel regimen- None indicated at this time      :   - Urine output adequate  Will continue to monitor Is and Os closely, Hernandez in place       F/E/N:   - NPO, consider advancing diet today, will discuss with surgery   - Replete electrolytes Mg >2 5, K >4 5     ID:   - WCB elevated likely secondary to ischemic bowel, stress reaction and steroids, management as above       Heme:   - Thrombocytopenia- Platelets 513 this morning following 2 units of platelets  Will continue to monitor    - DVT prophylaxis- Eliquis held yesterday, will discuss restarting Heparin today  SCDs       Endo:   - Continue SSI #6 with goal glucose <180       Msk/Skin:   - Frequent repositioning, PT/OT when appropriate       Disposition: Continue ICU level care          Code Status: Level 1 - Full Code    ______________________________________________________________________    Chief Complaint: Nods head yes when asked if having abdominal pain, otherwise no complaints and appears comfortable     24 Hour Events: Lower blood pressures with MAP down to 62, 500 mL bolus Isolyte given  Otherwise no acute events        ______________________________________________________________________    Physical Exam:       Physical Exam   Constitutional: He appears well-developed and well-nourished  No distress  Neck: Normal range of motion  Neck supple  Cardiovascular: Normal rate, regular rhythm, normal heart sounds and intact distal pulses  No murmur heard  Pulmonary/Chest: No respiratory distress  He has wheezes     Diffuse inspiratory and expiratory wheezing, diminished breath sounds at bases    Abdominal: Soft  There is no rebound and no guarding  Colostomy bag in place with brown stool and blood present in bag with bloody oozing on surrounding bandage  Abdomen soft, tender to palpation but no rebound or guarding    Neurological: He is alert  Opens eyes spontaneously and follows commands appropriately    Skin: Skin is warm  He is not diaphoretic  Vitals reviewed  ______________________________________________________________________  Vitals:    18 0200 18 0325 18 0400 18 0500   BP:       BP Location:       Pulse: 76 72 72 72   Resp: 16 16 16 16   Temp:   100 °F (37 8 °C)    TempSrc:   Rectal    SpO2: 97% 97% 97% 96%   Weight:       Height:         Arterial Line BP: 118/54  Arterial Line MAP (mmHg): 74 mmHg     Temperature:   Temp (24hrs), Av °F (37 2 °C), Min:97 2 °F (36 2 °C), Max:100 9 °F (38 3 °C)    Current Temperature: 100 °F (37 8 °C)  Weights:   IBW: 63 8 kg    Body mass index is 30 81 kg/m²  Weight (last 2 days)     Date/Time   Weight    18 0937  --    Comment rows:    OBSERV: Pt brought to [facility] via bed for urgent bowel surgery  Awake, lethargic  SBAR from MICU nurse  Daly     at 18 0937    18 0600  86 6 ([phone])            Hemodynamic Monitoring:    N/A    Non-Invasive/Invasive Ventilation Settings:  Respiratory    Lab Data (Last 4 hours)    None         O2/Vent Data (Last 4 hours)       0325           Vent Mode AC/VC       Resp Rate (BPM) (BPM) 16       Vt (mL) (mL) 450       FIO2 (%) (%) 50       PEEP (cmH2O) (cmH2O) 5       MV 7 74       FIO2 (%) (%) 40       PEEP (cmH2O) (cmH2O) 5       Pressure Support (cmH2O) (cmH20) 5                 Lab Results   Component Value Date    PHART 7 484 (H) 2018    BBX3LSJ 42 3 2018    PO2ART 67 8 (L) 2018    BHC1BJH 31 1 (H) 2018    BEART 7 0 2018    SOURCE Line, Arterial 2018     SpO2: SpO2: 97 %, SpO2 Activity: SpO2 Activity: At Rest, SpO2 Device: O2 Device: Other (comment), Capnography:    Intake and Outputs:  I/O       05/21 0701 - 05/22 0700 05/22 0701 - 05/23 0700    P  O  300     I V  (mL/kg) 452 2 (5 2) 6211 3 (71 7)    Blood  950    NG/     IV Piggyback 200 845    Feedings 196     Total Intake(mL/kg) 1248 2 (14 4) 8006 3 (92 5)    Urine (mL/kg/hr) 2120 (1) 1145 (0 6)    Emesis/NG output 0 (0) 170 (0 1)    Drains  265 (0 1)    Stool 0 (0) 175 (0 1)    Blood  550 (0 3)    Total Output 2120 2305    Net -871 8 +5701 3          Unmeasured Urine Occurrence 1 x 1 x    Unmeasured Stool Occurrence 3 x 1 x        UOP: 100/hour   Nutrition:        Diet Orders            Start     Ordered    05/22/18 0704  Diet NPO  Diet effective now     Question Answer Comment   Diet Type NPO    RD to adjust diet per protocol? Yes        05/22/18 0704          Labs:     Results from last 7 days  Lab Units 05/23/18 0445 05/22/18  1432 05/22/18  0447 05/21/18  0425 05/20/18  0427  05/18/18  1909   WBC Thousand/uL 11 28* 7 93 12 48* 7 06 10 15  --  15 07*   HEMOGLOBIN g/dL 10 4* 10 8* 11 3* 11 0* 10 9*  --  12 5   I STAT HEMOGLOBIN   --   --   --   --   --   < >  --    HEMATOCRIT % 32 3* 34 0* 36 3* 33 9* 32 8*  --  37 2   PLATELETS Thousands/uL 151 166 89* 86* 63*  --  76*   NEUTROS PCT %  --   --   --  71 83*  --  87*   MONOS PCT %  --   --   --  20* 10  --  7   MONO PCT MAN %  --  13* 7  --   --   --   --    < > = values in this interval not displayed      Results from last 7 days  Lab Units 05/23/18 0445 05/22/18  1432 05/22/18  0447 05/21/18  0425   SODIUM mmol/L 142 141 140 144   POTASSIUM mmol/L 5 1 5 1 4 6 3 9   CHLORIDE mmol/L 105 105 103 105   CO2 mmol/L 34* 33* 32 35*   BUN mg/dL 20 19 20 14   CREATININE mg/dL 0 69 0 69 0 74 0 86   CALCIUM mg/dL 7 4* 8 0* 7 6* 7 4*   TOTAL PROTEIN g/dL 4 7*  --  5 7* 5 0*   BILIRUBIN TOTAL mg/dL 0 97  --  0 74 0 68   ALK PHOS U/L 61  --  115 131*   ALT U/L 50  --  51 55   AST U/L 40  --  47* 50*   GLUCOSE RANDOM mg/dL 175* 153* 112 124 Results from last 7 days  Lab Units 05/23/18  0445 05/22/18  1432 05/22/18  0447   MAGNESIUM mg/dL 2 3 1 8 2 2     Lab Results   Component Value Date    PHOS 2 7 05/23/2018    PHOS 2 7 05/22/2018    PHOS 3 0 05/21/2018        Results from last 7 days  Lab Units 05/23/18  0445 05/22/18  1432 05/22/18  0447   INR  1 28* 1 27* 1 14   PTT seconds 29 31  --        0  Lab Value Date/Time   TROPONINI 0 12 (H) 05/20/2018 1753   TROPONINI 0 17 (H) 05/20/2018 1505   TROPONINI 0 17 (H) 05/20/2018 1214   TROPONINI 0 29 (H) 05/19/2018 0438   TROPONINI 0 30 (H) 05/19/2018 0154   TROPONINI 0 32 (H) 05/18/2018 2229   TROPONINI 0 32 (H) 05/18/2018 1909   TROPONINI 1 15 (H) 05/16/2018 1416   TROPONINI 1 24 (H) 05/16/2018 0522   TROPONINI 1 16 (H) 05/16/2018 0209   TROPONINI 1 09 (H) 05/16/2018 0053   TROPONINI 0 85 (H) 05/15/2018 1935   TROPONINI 0 72 (H) 05/15/2018 1728   TROPONINI 0 47 (H) 05/15/2018 1304   TROPONINI 0 02 12/07/2016 1931   TROPONINI 0 04 (H) 12/07/2016 0907   TROPONINI <0 04 05/22/2015 0123       Results from last 7 days  Lab Units 05/22/18  1432 05/22/18  0824 05/16/18  0722   LACTIC ACID mmol/L 1 4 0 8 1 6     ABG:  Lab Results   Component Value Date    PHART 7 484 (H) 05/22/2018    CUK0LIJ 42 3 05/22/2018    PO2ART 67 8 (L) 05/22/2018    OYH3PSO 31 1 (H) 05/22/2018    BEART 7 0 05/22/2018    SOURCE Line, Arterial 05/22/2018     Imaging: Chest XR- Final read pending, no opacities or effusion noted  I have personally reviewed pertinent reports  Micro:  Lab Results   Component Value Date    BLOODCX No Growth After 5 Days  05/15/2018    BLOODCX No Growth After 5 Days  05/15/2018     Allergies:    Allergies   Allergen Reactions    Other      Brown cloth band aids        Medications:   Scheduled Meds:  Current Facility-Administered Medications:  acetaminophen 650 mg Rectal Q6H PRN Vasiliy Bennett MD    amiodarone 0 5 mg/min Intravenous Continuous Deb Nath PA-C Last Rate: 0 5 mg/min (05/22/18 2215)   atorvastatin 40 mg Oral Daily With Arslan Batista MD    budesonide 0 5 mg Nebulization Q12H Avery Alfaro PA-C    chlorhexidine 15 mL Swish & Spit Q12H [facility], DO    dexmedetomidine 0 1-0 7 mcg/kg/hr Intravenous Titrated [facility], DO Last Rate: 0 4 mcg/kg/hr (05/23/18 0400)   diazepam 5 mg Intravenous Q6H Jiaayde YatesWolcott, DO    folic acid IVPB 1 mg Intravenous Daily Long Nguyen PA-C Last Rate: Stopped (05/22/18 5621)   HYDROmorphone 0 5 mg Intravenous Q1H PRN Britni Canela PA-C    insulin lispro 5-25 Units Subcutaneous Q6H [facility] Keyana Fournier PA-C    ipratropium 0 5 mg Nebulization Q6H Danyell Saavedra, DO    levalbuterol 1 25 mg Nebulization Q6H Danyell Saavedra, DO    LORazepam 0 5 mg Intravenous Q2H PRN [facility], DO    Or        LORazepam 1 mg Intravenous Q2H PRN [facility], DO    Or        LORazepam 2 mg Intravenous Q2H PRN Jiaayde YatesWolcott, DO    methylPREDNISolone sodium succinate 40 mg Intravenous [facility] AMALIA Clifton    metoprolol 5 mg Intravenous Q6H PRN Keyana Fournier PA-C    nicotine 14 mg Transdermal Daily AMALIA Ken    pantoprazole 40 mg Intravenous Early Morning Britni Canela PA-C    piperacillin-tazobactam 3 375 g Intravenous Q6H Jia Wolcott, DO Last Rate: 3 375 g (05/23/18 0339)   propofol 5-50 mcg/kg/min Intravenous Titrated Jiaayde YatesWolcott,  Last Rate: Stopped (05/22/18 2030)   sodium chloride 100 mL/hr Intravenous Continuous [facility] Last Rate: 100 mL/hr (05/23/18 0100)   thiamine 100 mg Intravenous Daily Long Nguyen PA-C Last Rate: Stopped (05/22/18 0570)     Continuous Infusions:  amiodarone 0 5 mg/min Last Rate: 0 5 mg/min (05/22/18 2215)   dexmedetomidine 0 1-0 7 mcg/kg/hr Last Rate: 0 4 mcg/kg/hr (05/23/18 0400)   propofol 5-50 mcg/kg/min Last Rate: Stopped (05/22/18 2030)   sodium chloride 100 mL/hr Last Rate: 100 mL/hr (05/23/18 0100)     PRN Meds:    acetaminophen 650 mg Q6H PRN HYDROmorphone 0 5 mg Q1H PRN   LORazepam 0 5 mg Q2H PRN   Or     LORazepam 1 mg Q2H PRN   Or     LORazepam 2 mg Q2H PRN   metoprolol 5 mg Q6H PRN     VTE Pharmacologic Prophylaxis: Reason for no pharmacologic prophylaxis Eliquis held yesterday prior to surgery   VTE Mechanical Prophylaxis: sequential compression device  Invasive lines and devices: Invasive Devices     Peripheral Intravenous Line            Peripheral IV 05/19/18 Left Arm 3 days    Peripheral IV 05/20/18 Right Hand 2 days    Peripheral IV 05/22/18 Left Hand less than 1 day    Peripheral IV 05/23/18 Right Antecubital less than 1 day          Arterial Line            Arterial Line 05/22/18 Right Radial less than 1 day          Drain            Closed/Suction Drain Left LLQ Bulb 10 Fr  less than 1 day    Closed/Suction Drain Left LLQ Bulb 10 Fr  less than 1 day    Colostomy  RUQ less than 1 day    NG/OG/Enteral Tube Nasogastric 10 Fr Right nares less than 1 day    Negative Pressure Wound Therapy (V A C ) Abdomen Mid less than 1 day    Urethral Catheter Latex 16 Fr  less than 1 day          Airway            ETT  Hi-Lo; Cuffed;Oral 8 mm less than 1 day                     Diomedes Seymour, DO

## 2018-05-24 PROBLEM — D64.9 ANEMIA: Status: ACTIVE | Noted: 2018-05-24

## 2018-05-24 PROBLEM — K66.8 FREE INTRAPERITONEAL AIR: Status: RESOLVED | Noted: 2018-05-15 | Resolved: 2018-05-24

## 2018-05-24 PROBLEM — E83.51 HYPOCALCEMIA: Status: ACTIVE | Noted: 2018-05-24

## 2018-05-24 PROBLEM — R06.89 ACUTE RESPIRATORY INSUFFICIENCY: Status: ACTIVE | Noted: 2018-05-19

## 2018-05-24 LAB
ANION GAP SERPL CALCULATED.3IONS-SCNC: 4 MMOL/L (ref 4–13)
BASOPHILS # BLD AUTO: 0.01 THOUSANDS/ΜL (ref 0–0.1)
BASOPHILS NFR BLD AUTO: 0 % (ref 0–1)
BUN SERPL-MCNC: 18 MG/DL (ref 5–25)
CA-I BLD-SCNC: 0.97 MMOL/L (ref 1.12–1.32)
CALCIUM SERPL-MCNC: 7.7 MG/DL (ref 8.3–10.1)
CHLORIDE SERPL-SCNC: 104 MMOL/L (ref 100–108)
CO2 SERPL-SCNC: 34 MMOL/L (ref 21–32)
CREAT SERPL-MCNC: 0.62 MG/DL (ref 0.6–1.3)
EOSINOPHIL # BLD AUTO: 0 THOUSAND/ΜL (ref 0–0.61)
EOSINOPHIL NFR BLD AUTO: 0 % (ref 0–6)
ERYTHROCYTE [DISTWIDTH] IN BLOOD BY AUTOMATED COUNT: 17.2 % (ref 11.6–15.1)
EST. AVERAGE GLUCOSE BLD GHB EST-MCNC: 134 MG/DL
GFR SERPL CREATININE-BSD FRML MDRD: 112 ML/MIN/1.73SQ M
GLUCOSE SERPL-MCNC: 106 MG/DL (ref 65–140)
GLUCOSE SERPL-MCNC: 126 MG/DL (ref 65–140)
GLUCOSE SERPL-MCNC: 137 MG/DL (ref 65–140)
GLUCOSE SERPL-MCNC: 138 MG/DL (ref 65–140)
GLUCOSE SERPL-MCNC: 140 MG/DL (ref 65–140)
GLUCOSE SERPL-MCNC: 183 MG/DL (ref 65–140)
HBA1C MFR BLD: 6.3 % (ref 4.2–6.3)
HCT VFR BLD AUTO: 27.9 % (ref 36.5–49.3)
HGB BLD-MCNC: 9 G/DL (ref 12–17)
LYMPHOCYTES # BLD AUTO: 0.83 THOUSANDS/ΜL (ref 0.6–4.47)
LYMPHOCYTES NFR BLD AUTO: 6 % (ref 14–44)
MCH RBC QN AUTO: 31.8 PG (ref 26.8–34.3)
MCHC RBC AUTO-ENTMCNC: 32.3 G/DL (ref 31.4–37.4)
MCV RBC AUTO: 99 FL (ref 82–98)
MONOCYTES # BLD AUTO: 0.77 THOUSAND/ΜL (ref 0.17–1.22)
MONOCYTES NFR BLD AUTO: 6 % (ref 4–12)
NEUTROPHILS # BLD AUTO: 11.68 THOUSANDS/ΜL (ref 1.85–7.62)
NEUTS SEG NFR BLD AUTO: 87 % (ref 43–75)
NRBC BLD AUTO-RTO: 0 /100 WBCS
PHOSPHATE SERPL-MCNC: 2.9 MG/DL (ref 2.7–4.5)
PLATELET # BLD AUTO: 165 THOUSANDS/UL (ref 149–390)
PMV BLD AUTO: 10.2 FL (ref 8.9–12.7)
POTASSIUM SERPL-SCNC: 4.6 MMOL/L (ref 3.5–5.3)
RBC # BLD AUTO: 2.83 MILLION/UL (ref 3.88–5.62)
SODIUM SERPL-SCNC: 142 MMOL/L (ref 136–145)
WBC # BLD AUTO: 13.39 THOUSAND/UL (ref 4.31–10.16)

## 2018-05-24 PROCEDURE — 99232 SBSQ HOSP IP/OBS MODERATE 35: CPT | Performed by: INTERNAL MEDICINE

## 2018-05-24 PROCEDURE — 82948 REAGENT STRIP/BLOOD GLUCOSE: CPT

## 2018-05-24 PROCEDURE — 82330 ASSAY OF CALCIUM: CPT | Performed by: FAMILY MEDICINE

## 2018-05-24 PROCEDURE — 94640 AIRWAY INHALATION TREATMENT: CPT

## 2018-05-24 PROCEDURE — 80048 BASIC METABOLIC PNL TOTAL CA: CPT | Performed by: FAMILY MEDICINE

## 2018-05-24 PROCEDURE — 94644 CONT INHLJ TX 1ST HOUR: CPT

## 2018-05-24 PROCEDURE — 85025 COMPLETE CBC W/AUTO DIFF WBC: CPT | Performed by: FAMILY MEDICINE

## 2018-05-24 PROCEDURE — 84100 ASSAY OF PHOSPHORUS: CPT | Performed by: FAMILY MEDICINE

## 2018-05-24 PROCEDURE — 94760 N-INVAS EAR/PLS OXIMETRY 1: CPT

## 2018-05-24 PROCEDURE — 83036 HEMOGLOBIN GLYCOSYLATED A1C: CPT | Performed by: FAMILY MEDICINE

## 2018-05-24 PROCEDURE — 99233 SBSQ HOSP IP/OBS HIGH 50: CPT | Performed by: INTERNAL MEDICINE

## 2018-05-24 PROCEDURE — C9113 INJ PANTOPRAZOLE SODIUM, VIA: HCPCS | Performed by: PHYSICIAN ASSISTANT

## 2018-05-24 RX ORDER — LANOLIN ALCOHOL/MO/W.PET/CERES
400 CREAM (GRAM) TOPICAL DAILY
Status: DISCONTINUED | OUTPATIENT
Start: 2018-05-24 | End: 2018-06-01 | Stop reason: HOSPADM

## 2018-05-24 RX ORDER — METHYLPREDNISOLONE SODIUM SUCCINATE 40 MG/ML
40 INJECTION, POWDER, LYOPHILIZED, FOR SOLUTION INTRAMUSCULAR; INTRAVENOUS
Status: DISCONTINUED | OUTPATIENT
Start: 2018-05-25 | End: 2018-05-25

## 2018-05-24 RX ORDER — DIAZEPAM 2 MG/1
5 TABLET ORAL EVERY 6 HOURS
Status: DISCONTINUED | OUTPATIENT
Start: 2018-05-24 | End: 2018-05-25

## 2018-05-24 RX ORDER — PANTOPRAZOLE SODIUM 40 MG/1
40 TABLET, DELAYED RELEASE ORAL
Status: DISCONTINUED | OUTPATIENT
Start: 2018-05-25 | End: 2018-06-01 | Stop reason: HOSPADM

## 2018-05-24 RX ORDER — ASPIRIN 81 MG/1
81 TABLET, CHEWABLE ORAL DAILY
Status: DISCONTINUED | OUTPATIENT
Start: 2018-05-24 | End: 2018-06-01 | Stop reason: HOSPADM

## 2018-05-24 RX ORDER — THIAMINE MONONITRATE (VIT B1) 100 MG
100 TABLET ORAL DAILY
Status: DISCONTINUED | OUTPATIENT
Start: 2018-05-24 | End: 2018-06-01 | Stop reason: HOSPADM

## 2018-05-24 RX ORDER — ALBUTEROL SULFATE 90 UG/1
2 AEROSOL, METERED RESPIRATORY (INHALATION) EVERY 4 HOURS PRN
Status: DISCONTINUED | OUTPATIENT
Start: 2018-05-24 | End: 2018-06-01 | Stop reason: HOSPADM

## 2018-05-24 RX ORDER — BUDESONIDE AND FORMOTEROL FUMARATE DIHYDRATE 160; 4.5 UG/1; UG/1
2 AEROSOL RESPIRATORY (INHALATION) 2 TIMES DAILY
Status: DISCONTINUED | OUTPATIENT
Start: 2018-05-24 | End: 2018-06-01 | Stop reason: HOSPADM

## 2018-05-24 RX ADMIN — Medication 5 MG: at 03:52

## 2018-05-24 RX ADMIN — IPRATROPIUM BROMIDE 0.5 MG: 0.5 SOLUTION RESPIRATORY (INHALATION) at 20:33

## 2018-05-24 RX ADMIN — BUDESONIDE AND FORMOTEROL FUMARATE DIHYDRATE 2 PUFF: 160; 4.5 AEROSOL RESPIRATORY (INHALATION) at 13:50

## 2018-05-24 RX ADMIN — BUDESONIDE AND FORMOTEROL FUMARATE DIHYDRATE 2 PUFF: 160; 4.5 AEROSOL RESPIRATORY (INHALATION) at 18:44

## 2018-05-24 RX ADMIN — Medication 3.38 G: at 03:52

## 2018-05-24 RX ADMIN — IPRATROPIUM BROMIDE 0.5 MG: 0.5 SOLUTION RESPIRATORY (INHALATION) at 13:55

## 2018-05-24 RX ADMIN — Medication 5 MG: at 08:58

## 2018-05-24 RX ADMIN — LEVALBUTEROL HYDROCHLORIDE 1.25 MG: 1.25 SOLUTION, CONCENTRATE RESPIRATORY (INHALATION) at 13:55

## 2018-05-24 RX ADMIN — HYDROMORPHONE HYDROCHLORIDE 0.5 MG: 1 INJECTION, SOLUTION INTRAMUSCULAR; INTRAVENOUS; SUBCUTANEOUS at 16:26

## 2018-05-24 RX ADMIN — DIAZEPAM 5 MG: 2 TABLET ORAL at 21:24

## 2018-05-24 RX ADMIN — AMIODARONE HYDROCHLORIDE 0.5 MG/MIN: 50 INJECTION, SOLUTION INTRAVENOUS at 08:57

## 2018-05-24 RX ADMIN — ASPIRIN 81 MG 81 MG: 81 TABLET ORAL at 15:41

## 2018-05-24 RX ADMIN — ATORVASTATIN CALCIUM 40 MG: 40 TABLET, FILM COATED ORAL at 15:41

## 2018-05-24 RX ADMIN — Medication 0.3 MCG/KG/HR: at 04:00

## 2018-05-24 RX ADMIN — BUDESONIDE 0.5 MG: 0.5 INHALANT RESPIRATORY (INHALATION) at 07:39

## 2018-05-24 RX ADMIN — HEPARIN SODIUM 5000 UNITS: 5000 INJECTION, SOLUTION INTRAVENOUS; SUBCUTANEOUS at 21:24

## 2018-05-24 RX ADMIN — HEPARIN SODIUM 5000 UNITS: 5000 INJECTION, SOLUTION INTRAVENOUS; SUBCUTANEOUS at 13:49

## 2018-05-24 RX ADMIN — PANTOPRAZOLE SODIUM 40 MG: 40 INJECTION, POWDER, FOR SOLUTION INTRAVENOUS at 05:46

## 2018-05-24 RX ADMIN — LEVALBUTEROL HYDROCHLORIDE 1.25 MG: 1.25 SOLUTION, CONCENTRATE RESPIRATORY (INHALATION) at 20:33

## 2018-05-24 RX ADMIN — THIAMINE HYDROCHLORIDE 100 MG: 100 INJECTION, SOLUTION INTRAMUSCULAR; INTRAVENOUS at 09:05

## 2018-05-24 RX ADMIN — METHYLPREDNISOLONE SODIUM SUCCINATE 40 MG: 40 INJECTION, POWDER, FOR SOLUTION INTRAMUSCULAR; INTRAVENOUS at 08:58

## 2018-05-24 RX ADMIN — METOPROLOL TARTRATE 25 MG: 25 TABLET ORAL at 21:24

## 2018-05-24 RX ADMIN — Medication 3.38 G: at 15:42

## 2018-05-24 RX ADMIN — Medication 3.38 G: at 11:09

## 2018-05-24 RX ADMIN — HEPARIN SODIUM 5000 UNITS: 5000 INJECTION, SOLUTION INTRAVENOUS; SUBCUTANEOUS at 05:46

## 2018-05-24 RX ADMIN — NICOTINE 14 MG: 14 PATCH, EXTENDED RELEASE TRANSDERMAL at 08:56

## 2018-05-24 RX ADMIN — Medication 3.38 G: at 23:00

## 2018-05-24 RX ADMIN — METOPROLOL TARTRATE 25 MG: 25 TABLET ORAL at 15:41

## 2018-05-24 RX ADMIN — LEVALBUTEROL HYDROCHLORIDE 1.25 MG: 1.25 SOLUTION, CONCENTRATE RESPIRATORY (INHALATION) at 07:38

## 2018-05-24 RX ADMIN — FOLIC ACID 1 MG: 5 INJECTION, SOLUTION INTRAMUSCULAR; INTRAVENOUS; SUBCUTANEOUS at 10:26

## 2018-05-24 RX ADMIN — HYDROMORPHONE HYDROCHLORIDE 0.5 MG: 1 INJECTION, SOLUTION INTRAMUSCULAR; INTRAVENOUS; SUBCUTANEOUS at 21:25

## 2018-05-24 RX ADMIN — IPRATROPIUM BROMIDE 0.5 MG: 0.5 SOLUTION RESPIRATORY (INHALATION) at 07:39

## 2018-05-24 RX ADMIN — CALCIUM CHLORIDE 1 G: 100 INJECTION INTRAVENOUS; INTRAVENTRICULAR at 07:31

## 2018-05-24 NOTE — PROGRESS NOTES
Progress Note - Critical Care   Bobbi Nguyen 56 y o  male MRN: [id number]  Unit/Bed#: MICU 06 Encounter: [id number]          ______________________________________________________________________  Assessment and Plan:   Patient Active Problem List   Diagnosis    Colitis    Spinal stenosis of cervical region    Hypertension    Pericarditis    Coronary artery disease involving native coronary artery of native heart    Leukocytosis    Asthma    Atherosclerosis of coronary artery    [facility]ation [facility]    Dyslipidemia    Acute kidney failure ([facility]    Alcohol abuse    Tobacco abuse    [facility]    Acute respiratory failure with hypoxia ([facility]    Agitation    Heart failure, systolic, due to idiopathic cardiomyopathy ([facility]    Free intraperitoneal air    Presence of ileostomy ([facility]    Moderate protein-calorie malnutrition ([facility]       Neuro:   - Alert and following commands, Precedex weaning currently at 0 3  Required no PRN overnight for agitation  Continue Valium 5 mg every 6 hours, CIWA protocol, consider starting to taper Valium dose  Continue IV Thiamine and Folate supplementation         CV:   - Afib with RVR- Continue Amiodarone drip 0 5 mg, metoprolol 25 mg q12 held given NPO  Repeat ECHO when heart rate is controlled in 2-3 weeks per cardiology  Eliquis 5 mg BID held given NPO status, Heparin started yesterday for DVT prophylaxis   - History of CAD with inferior MR s/p RCA stent 2012- Aspirin and Atorvastatin held given NPO  - New systolic CHF- Lasix 40 mg x2 given yesterday with -2 6 L over 24 hours, MAP >65  Consider discontinuing A-line today      Pulm:   - Acute hypoxic respiratory failure in the setting of acute COPD exacerbation- Successfully extubated yesterday, comfortably on 4L NC   Continue Solumedrol 40 mg q8 with Xopenex and Atrovent q6, Pulmicort BID        GI:   - POD 2 following exploratory laparotomy with ileocolectomy and loop ileostomy s/p transverse colon perforation- Continue routine post-operative care per general surgery, blood mixed with brown stool present in ostomy bag this morning, will continue to monitor  Continue Zosyn q6  Plan for [facility] takedown and skin closure 5/25  - Elevated ammonia- Resolved, Lactulose discontinued   - GI prophylaxis- Protonix 40 mg daily   - Bowel regimen- None indicated at this time      :   - Urine output adequate  Will continue to monitor Is and Os closely, will discontinue Hernandez today       F/E/N:   - Strict NPO, consider starting TPN today   - Replete electrolytes Mg >2 5, K >4 5     ID:   - WCB elevated likely secondary to ischemic bowel, stress reaction and steroids, management as above       Heme:   - Thrombocytopenia- Platelets 625 KK[facility] morning following 2 units of platelets in the OR  Will continue to monitor    - DVT prophylaxis- Heparin and SCDs      Endo:   - Continue SSI #6 with goal glucose <180  A1c 6 3, outpatient follow-up         Msk/Skin:   - Frequent repositioning, PT/OT when appropriate       Disposition: Continue ICU level care       Code Status: Level 1 - Full Code    ______________________________________________________________________    Chief Complaint: None    24 Hour Events: Dilaudid 0 5 mg required twice last night for pain, otherwise no acute events        ______________________________________________________________________    Physical Exam:       Physical Exam   Constitutional: He is oriented to person, place, and time  He appears well-developed and well-nourished  No distress  HENT:   Head: Normocephalic and atraumatic  Neck: Normal range of motion  Neck supple  Cardiovascular: Normal rate, regular rhythm, normal heart sounds and intact distal pulses  No murmur heard  Pulmonary/Chest: Effort normal  No respiratory distress  No accessory muscle use, diffuse end-expiratory wheeze, diminished breath sounds at bases    Abdominal: Soft     Absent bowel sounds, no rebound or guarding, 2 SANTA drains in place, colostomy bag intact with red blood intermixed with brown stool     Musculoskeletal: He exhibits no edema  Neurological: He is alert and oriented to person, place, and time  He exhibits normal muscle tone  Skin: Skin is warm  No rash noted  He is not diaphoretic  No pallor  Psychiatric: He has a normal mood and affect  His behavior is normal  Thought content normal    Vitals reviewed  ______________________________________________________________________  Vitals:    18 0000 18 0200 18 0300 18 0400   BP:       BP Location:       Pulse: 80 78 80 78   Resp:    Temp:       TempSrc:       SpO2: 96% 99% 97% 96%   Weight:       Height:         Arterial Line BP: 118/56  Arterial Line MAP (mmHg): 78 mmHg     Temperature:   Temp (24hrs), Av 6 °F (37 °C), Min:98 1 °F (36 7 °C), Max:99 3 °F (37 4 °C)    Current Temperature: 98 5 °F (36 9 °C)  Weights:   IBW: 63 8 kg    Body mass index is 31 88 kg/m²  Weight (last 2 days)     Date/Time   Weight    18 0500  89 6 (197 53)    18 0937  --    Comment rows:    OBSERV: Pt [facility] for urgent bowel surgery  Awake, lethargic  SBAR from MICU nurse  Daly     at 18 0510            Hemodynamic Monitoring:   N/A  Non-Invasive/Invasive Ventilation Settings:  Respiratory    Lab Data (Last 4 hours)    None         O2/Vent Data (Last 4 hours)    None              No results found for: PHART, QXW9ZBM, PO2ART, IHL7WVP, F3SWCUUB, BEART, SOURCE  SpO2: SpO2: 97 %, SpO2 Activity: SpO2 Activity: At Rest, SpO2 Device: O2 Device: Nasal cannula, Capnography:    Intake and Outputs:  I/O        07 -  0700  07 -  0700    I V  (mL/kg) 6462 1 (72 1) 1000 7 (11 2)    Blood 950     NG/GT  0    IV Piggyback 900 600    Total Intake(mL/kg) 8312 1 (92 8) 1600 7 (17 9)    Urine (mL/kg/hr) 1245 (0 6) 3030 (1 4)    Emesis/NG output 220 (0 1) 850 (0 4)    Drains 305 (0 1) 285 (0 1)    Stool 175 (0 1) 40 (0)    Blood 550 (0 3)     Total Output 2495 4205    Net +5817 1 -2604 3          Unmeasured Urine Occurrence 1 x     Unmeasured Stool Occurrence 1 x         UOP: 125/hour   Nutrition:        Diet Orders            Start     Ordered    05/22/18 0704  Diet NPO  Diet effective now     Question Answer Comment   Diet Type NPO    RD to adjust diet per protocol? Yes        05/22/18 0704          Labs:     Results from last 7 days  Lab Units 05/24/18 0451 05/23/18 0445 05/22/18 1432 05/21/18  0425 05/20/18  0427   WBC Thousand/uL 13 39* 11 28* 7 93  < > 7 06 10 15   HEMOGLOBIN g/dL 9 0* 10 4* 10 8*  < > 11 0* 10 9*   I STAT HEMOGLOBIN   --   --   --   < >  --   --    HEMATOCRIT % 27 9* 32 3* 34 0*  < > 33 9* 32 8*   PLATELETS Thousands/uL 165 151 166  < > 86* 63*   NEUTROS PCT % 87*  --   --   --  71 83*   MONOS PCT % 6  --   --   --  20* 10   MONO PCT MAN %  --  0* 13*  < >  --   --    < > = values in this interval not displayed  Results from last 7 days  Lab Units 05/24/18 0451 05/23/18 0445 05/22/18 1432 05/22/18 0447 05/21/18  0425   SODIUM mmol/L 142 142 141  --  140 144   POTASSIUM mmol/L 4 6 5 1 5 1  --  4 6 3 9   CHLORIDE mmol/L 104 105 105  --  103 105   CO2 mmol/L 34* 34* 33*  --  32 35*   BUN mg/dL 18 20 19  --  20 14   CREATININE mg/dL 0 62 0 69 0 69  --  0 74 0 86   CALCIUM mg/dL 7 7* 7 4* 8 0*  --  7 6* 7 4*   TOTAL PROTEIN g/dL  --  4 7*  --   --  5 7* 5 0*   BILIRUBIN TOTAL mg/dL  --  0 97  --   --  0 74 0 68   ALK PHOS U/L  --  61  --   --  115 131*   ALT U/L  --  50  --   --  51 55   AST U/L  --  40  --   --  47* 50*   GLUCOSE RANDOM mg/dL 126 175* 153*  --  112 124   GLUCOSE, ISTAT   --   --   --   < >  --   --    < > = values in this interval not displayed      Results from last 7 days  Lab Units 05/23/18  0445 05/22/18  1432 05/22/18  0447   MAGNESIUM mg/dL 2 3 1 8 2 2     Lab Results   Component Value Date    PHOS 2 9 05/24/2018    PHOS 2 7 05/23/2018 PHOS 2 7 2018        Results from last 7 days  Lab Units 18  0445 18  1432 18  0447   INR  1 28* 1 27* 1 14   PTT seconds 29 31  --        0  Lab Value Date/Time   TROPONINI 0 12 (H) 2018 1753   TROPONINI 0 17 (H) 2018 1505   TROPONINI 0 17 (H) 2018 1214   TROPONINI 0 29 (H) 2018 0438   TROPONINI 0 30 (H) 2018 0154   TROPONINI 0 32 (H) 2018 2229   TROPONINI 0 32 (H) 2018 1909   TROPONINI 1 15 (H) 2018 1416   TROPONINI 1 24 (H) 2018 0522   TROPONINI 1 16 (H) 2018 0209   TROPONINI 1 09 (H) 2018 0053   TROPONINI 0 85 (H) 05/15/2018 1935   TROPONINI 0 72 (H) 05/15/2018 1728   TROPONINI 0 47 (H) 05/15/2018 1304   TROPONINI 0 02 2016 1931   TROPONINI 0 04 (H) 2016 0907   TROPONINI <0 04 2015 0123       Results from last 7 days  Lab Units 18  1432 18  0824   LACTIC ACID mmol/L 1 4 0 8     ABG:  Lab Results   Component Value Date    PHART 7 484 (H) 2018    HQQ2JRZ 42 3 2018    PO2ART 67 8 (L) 2018    DDH8ROM 31 1 (H) 2018    BEART 7 0 2018    SOURCE Line, Arterial 2018     Imaging: No new imaging this morning  I have personally reviewed pertinent reports  EK/23- Normal sinus rhythm, possible anterior infarct, nonspecific T wave abnormality has replaced inverted T waves in inferior leads, QT has shortened; QTc 404   Micro:  Lab Results   Component Value Date    BLOODCX No Growth After 5 Days  05/15/2018    BLOODCX No Growth After 5 Days  05/15/2018     Allergies:    Allergies   Allergen Reactions    Other      Brown cloth band aids        Medications:   Scheduled Meds:  Current Facility-Administered Medications:  acetaminophen 650 mg Rectal Q6H PRN Malachy Petties, MD    amiodarone 0 5 mg/min Intravenous Continuous Susy Amy, PA-C Last Rate: 0 5 mg/min (18)   atorvastatin 40 mg Oral Daily With Sherre Halsted, MD    budesonide 0 5 mg Nebulization Q12H Espinoza Vides PA-C    calcium chloride 1 g Intravenous Once Yajaira Barrett, DO    dexmedetomidine 0 1-0 7 mcg/kg/hr Intravenous Titrated Aura Colon PA-C Last Rate: 0 3 mcg/kg/hr (05/24/18 0400)   diazepam 5 mg Intravenous Q6H Yajaira Barrett, DO    folic acid IVPB 1 mg Intravenous Daily Roman Peng PA-C Last Rate: Stopped (05/23/18 0954)   heparin (porcine) 5,000 Units Subcutaneous Q8H [facility] Aura Colon PA-C    HYDROmorphone 0 5 mg Intravenous Q1H PRN Britni Canela PA-C    insulin lispro 5-25 Units Subcutaneous Q6H [facility] Louise Resendiz PA-C    ipratropium 0 5 mg Nebulization Q6H Danyell Keri, DO    levalbuterol 1 25 mg Nebulization Q6H Danyell Keri, DO    LORazepam 0 5 mg Intravenous Q2H PRN Yajaira Barrett, DO    Or        LORazepam 1 mg Intravenous Q2H PRN Yajaira Barrett, DO    Or        LORazepam 2 mg Intravenous Q2H PRN Yajaira Barrett, DO    methylPREDNISolone sodium succinate 40 mg Intravenous Q12H [facility] Aura Colon PA-C    metoprolol 5 mg Intravenous Q6H PRN Louise Resendiz PA-C    nicotine 14 mg Transdermal Daily AMALIA Martin    pantoprazole 40 mg Intravenous Early Morning Britni Canela PA-C    piperacillin-tazobactam 3 375 g Intravenous Q6H Yajaira Barrett DO Last Rate: 3 375 g (05/24/18 0352)   thiamine 100 mg Intravenous Daily Roman Peng PA-C Last Rate: Stopped (05/23/18 0892)     Continuous Infusions:  amiodarone 0 5 mg/min Last Rate: 0 5 mg/min (05/22/18 2215)   dexmedetomidine 0 1-0 7 mcg/kg/hr Last Rate: 0 3 mcg/kg/hr (05/24/18 0400)     PRN Meds:    acetaminophen 650 mg Q6H PRN   HYDROmorphone 0 5 mg Q1H PRN   LORazepam 0 5 mg Q2H PRN   Or     LORazepam 1 mg Q2H PRN   Or     LORazepam 2 mg Q2H PRN   metoprolol 5 mg Q6H PRN     VTE Pharmacologic Prophylaxis: Heparin  VTE Mechanical Prophylaxis: sequential compression device  Invasive lines and devices:   Invasive Devices     Peripheral Intravenous Line            Peripheral IV 05/20/18 Right Hand 3 days    Peripheral IV 05/23/18 Right Forearm less than 1 day          Arterial Line            Arterial Line 05/22/18 Right Radial 1 day          Drain            Closed/Suction Drain Left LLQ Bulb 10 Fr  1 day    Closed/Suction Drain Left LLQ Bulb 10 Fr  1 day    Colostomy  RUQ 1 day    NG/OG/Enteral Tube Nasogastric 10 Fr Right nares 1 day    Negative Pressure Wound Therapy (V A C ) Abdomen Mid 1 day    Urethral Catheter Latex 16 Fr  1 day                     Katerin Gallego DO

## 2018-05-24 NOTE — PROGRESS NOTES
IM Residency Progress Note   Unit/Bed#: MICU 06 Encounter: [id number]  SOD Team A      Bobbi Nguyen 56 y o  male [id number]    Hospital Stay Days: 9    Assessment/Plan:    Principal Problem:    COPD with exacerbation [facility])  Active Problems:    Colitis    Coronary artery disease involving native coronary artery of native heart    Leukocytosis    Dyslipidemia    Acute kidney failure (HCC)    Alcohol abuse    Tobacco abuse    Alcohol dependence with withdrawal (HCC)    Acute respiratory insufficiency    Agitation    Heart failure, systolic, due to idiopathic cardiomyopathy (HCC)    Presence of ileostomy (HCC)    Moderate protein-calorie malnutrition (HCC)    Anemia    Hypocalcemia    Perforation of colon (HCC)    Acute Hypoxic Respiratory Failure  -Secondary to COPD exacerbation  S/p extubation on 5/23 after 1 failed attempt on 5/21  -CXR on 5/23 shows persistent left basilar effusion, with possible atelectasis or infection  Also pulmonary vascular congestion   -Continue respiratory protocol, xoponex and atrovent treatments, symbicort   -Continue prednisone 40mg iv daily  Perforated Transverse Colon  -Also with history of Crons/Ulcerative colitis with previous right hemicolectomy  -S/p ex-lap on 5/22 with ileocolectomy, diverting ielostomy, fascial closure and 2x SANTA drains placed   -Continue Zosyn 3 375g iv q6h, day 3/7   -Colorectal surgery on board  Afib with RVR   -s/p cardioversion on 5/18  Has been in NSR since that time   -Was on amiodarone drip  Anticipate transition to po   -Currently holding anticoagulation until cleared by colorectal surgery   -Rate control with metoprolol 25mg po bid   -Cardiology on board  New Onset Systolic CHF  -Echo on 7/74/87 shows EF 25% with severe diffuse hypokinesis, consistent with G2DD  Previous echo in 12/2016 showed EF 55% with diffuse hypokinesis  -S/p several doses of lasix in the ICU   Currently not on standing lasix   -Continue lopressor 25mg po bid   -Monitor I/Os, daily weights  NSTEMI type 2  -History of CAD w/ stenting in RCA in 2012   -Continue aspirin 81mg po daily, lipitor 40mg po daily,     Alcohol abuse  -Complicated by withdrawal in the ICU, was weaned off precedex   -Continue thiamine 100mg po daily and folate 400mcg po daily  -Continue valium 5mg po q6h  Will wean as tolerated  Hyperglycemia  -A1c on 5/24/18 was 6 3   -Continue sliding scale insulin and BS checks  HTN  -BPs have been stable  Shock, poa  -Resolved  -Secondary to sepsis vs  Hypovolemia, requiring resuscitation with levophed and massive fluids  DEBBY, poa  -Resolved  -Likely secondary to volume depletion  Thrombocytopenia  -Resolved  Disposition: Continue inpatient management for management of multiple comorbidities    Subjective:   Patient reports feeling well  Offered no acute complaints at the time of examination  He was in the process of being transferred to [facility] Course: As per NP Jarad and Dr Graciela Najera a 56 y  o  male who presents on 05/15 from home with complaint of malaise and weakness as well as incontinence of stool/urine due to inability to get off his couch   He has a past medical history significant for COPD, hypertension, coronary artery disease status post PCI to RCA in 2012, ulcerative colitis/Crohn's s/p right hemicolectomy, alcohol abuse, and tobacco abuse   Per the patient he has had several episodes of emesis daily for at least the past month  [facility] denies any increasing diarrhea or nausea, reports shortness of breath is at baseline, denies any fevers, chest pain, any difficulties voiding and reports urinating 3 to 4 times a day, he does endorse decreased appetite eating approximately 2 meals a day, he reports smoking cigars for 3-4 hours daily, drinking at least 1 bottle of kenji a day   In regards to his alcohol abuse he states that he has not stopped drinking for any significant amount of time and is unclear regarding withdrawal symptoms or seizures   In the emergency room he was significantly short of breath and received an extended nebulizer treatment with good effect   He was profoundly hypotensive and was resuscitated with 3 L of crystalloid, and ultimately started on norepinephrine   He had an acute kidney injury, significant metabolic derangements, an elevated troponin, and a lactic acidosis  Patient was admitted to the ICU, and resuscitated with fluids and levophed  His lactic acidosis improved and he was weaned off pressors  He was initially on vancomycin, cefepime, and flagyl, which was discontinued after a negative procalcitonin and negative blood cultures  He was also found to have acute encephalopathy secondary to alcohol withdrawal, for which he was started on Serax and precedex and eventually transitioned to valium  He required intubation for acute respiratory failure  He was extubated on , required re-intubation on , and was extubated again on   He was also noted to have developed new onset afib, and was cardioverted successfully to NSR on   He was subsequently started on amiodarone drip  An echo on  showed EF 25% with severe hypokinesis and G2DD  On  he was found to have pneumoperitoneum with perforated transverse colon  Colorectal surgery was consulted and patient underwent ileocolectomy, diverting ileostomy with placement of SANTA drains  Patient was medically cleared for transfer to [facility] on   Vitals: Temp (24hrs), Av 5 °F (36 9 °C), Min:98 3 °F (36 8 °C), Max:98 8 °F (37 1 °C)  Current: Temperature: 98 8 °F (37 1 °C)  Vitals:    18 1000 18 1130 18 1300 18 1430   BP:  144/87  131/76   BP Location:       Pulse: 82 88  82   Resp: 20 21  16   Temp:       TempSrc:       SpO2: (!) 75% 96% 96% 96%   Weight:       Height:        Body mass index is 30 42 kg/m²      Physical Exam:  Physical Exam   Constitutional: He is oriented to person, place, and time  He appears well-nourished  No distress  HENT:   Head: Normocephalic and atraumatic  Mouth/Throat: Oropharynx is clear and moist    Eyes: EOM are normal  Pupils are equal, round, and reactive to light  Cardiovascular: Normal rate  No murmur heard  Irregular, irregular   Pulmonary/Chest: Effort normal and breath sounds normal  No respiratory distress  He has no wheezes  He has no rales  On 2L NC   Abdominal: Soft  Bowel sounds are normal  Distention: baseline  There is tenderness  There is no rebound  Abdominal binder in place  gracia drains in place  Musculoskeletal: He exhibits no edema  Neurological: He is alert and oriented to person, place, and time  No cranial nerve deficit  Skin: No rash noted  No erythema  Psychiatric: He has a normal mood and affect  His behavior is normal    Vitals reviewed  Intake and Outputs:  I/O last 24 hours: In: 1999 [I V :1199; IV Piggyback:800]  Out: 4355 [Urine:3430; Emesis/NG output:850; Drains:335; Stool:40]  Nutrition:        Diet Orders            Start     Ordered    05/24/18 1402  Diet Clear Liquid  Diet effective now     Question Answer Comment   Diet Type Clear Liquid    RD to adjust diet per protocol? Yes        05/24/18 1401        Invasive lines and devices:   Invasive Devices     Peripheral Intravenous Line            Peripheral IV 05/23/18 Right Forearm 1 day    Peripheral IV 05/24/18 Right;Upper Arm less than 1 day          Drain            Closed/Suction Drain Left LLQ Bulb 10 Fr  2 days    Closed/Suction Drain Left LLQ Bulb 10 Fr  2 days    Colostomy  RUQ 2 days    Negative Pressure Wound Therapy (V A C ) Abdomen Mid 2 days                 Labs:     Results from last 7 days  Lab Units 05/24/18  0451 05/23/18  0445 05/22/18  1432  05/21/18  0425 05/20/18  0427   WBC Thousand/uL 13 39* 11 28* 7 93  < > 7 06 10 15   HEMOGLOBIN g/dL 9 0* 10 4* 10 8*  < > 11 0* 10 9*   I STAT HEMOGLOBIN   --   --   --   < >  --   --    HEMATOCRIT % 27 9* 32 3* 34 0*  < > 33 9* 32 8*   PLATELETS Thousands/uL 165 151 166  < > 86* 63*   NEUTROS PCT % 87*  --   --   --  71 83*   MONOS PCT % 6  --   --   --  20* 10   MONO PCT MAN %  --  0* 13*  < >  --   --    < > = values in this interval not displayed  Results from last 7 days  Lab Units 05/24/18  0451 05/23/18 0445 05/22/18  1432 05/22/18 0447 05/21/18  0425   SODIUM mmol/L 142 142 141  --  140 144   POTASSIUM mmol/L 4 6 5 1 5 1  --  4 6 3 9   CHLORIDE mmol/L 104 105 105  --  103 105   CO2 mmol/L 34* 34* 33*  --  32 35*   BUN mg/dL 18 20 19  --  20 14   CREATININE mg/dL 0 62 0 69 0 69  --  0 74 0 86   CALCIUM mg/dL 7 7* 7 4* 8 0*  --  7 6* 7 4*   TOTAL PROTEIN g/dL  --  4 7*  --   --  5 7* 5 0*   BILIRUBIN TOTAL mg/dL  --  0 97  --   --  0 74 0 68   ALK PHOS U/L  --  61  --   --  115 131*   ALT U/L  --  50  --   --  51 55   AST U/L  --  40  --   --  47* 50*   GLUCOSE RANDOM mg/dL 126 175* 153*  --  112 124   GLUCOSE, ISTAT   --   --   --   < >  --   --    < > = values in this interval not displayed      Results from last 7 days  Lab Units 05/23/18 0445 05/22/18  1432 05/22/18 0447   MAGNESIUM mg/dL 2 3 1 8 2 2     Lab Results   Component Value Date    PHOS 2 9 05/24/2018    PHOS 2 7 05/23/2018    PHOS 2 7 05/22/2018        Results from last 7 days  Lab Units 05/23/18 0445 05/22/18  1432 05/22/18 0447   INR  1 28* 1 27* 1 14   PTT seconds 29 31  --        0  Lab Value Date/Time   TROPONINI 0 12 (H) 05/20/2018 1753   TROPONINI 0 17 (H) 05/20/2018 1505   TROPONINI 0 17 (H) 05/20/2018 1214   TROPONINI 0 29 (H) 05/19/2018 0438   TROPONINI 0 30 (H) 05/19/2018 0154   TROPONINI 0 32 (H) 05/18/2018 2229   TROPONINI 0 32 (H) 05/18/2018 1909   TROPONINI 1 15 (H) 05/16/2018 1416   TROPONINI 1 24 (H) 05/16/2018 0522   TROPONINI 1 16 (H) 05/16/2018 0209   TROPONINI 1 09 (H) 05/16/2018 0053   TROPONINI 0 85 (H) 05/15/2018 1935   TROPONINI 0 72 (H) 05/15/2018 1728   TROPONINI 0 47 (H) 05/15/2018 1304   TROPONINI 0 02 12/07/2016 1931   TROPONINI 0 04 (H) 12/07/2016 0907   TROPONINI <0 04 05/22/2015 0123       Results from last 7 days  Lab Units 05/22/18  1432 05/22/18  0824   LACTIC ACID mmol/L 1 4 0 8     ABG:  Lab Results   Component Value Date    PHART 7 484 (H) 05/22/2018    FEO7ADE 42 3 05/22/2018    PO2ART 67 8 (L) 05/22/2018    IPH3FDC 31 1 (H) 05/22/2018    BEART 7 0 05/22/2018    SOURCE Line, Arterial 05/22/2018     Imaging: I have personally reviewed pertinent reports  Ct Chest Abdomen Pelvis Wo Contrast    Result Date: 5/15/2018  Impression: 1  Although exam is limited without intravenous contrast, there appears to be wall thickening centered around the sutures in the hepatic flexure of the colon, with associated inflammatory change and infiltration of the adjacent fat  There may be some involvement of the adjacent descending duodenum  Findings may be related to the history of Crohn's disease/ulcerative colitis  Occult tumor not excluded at this time  Follow-up to resolution recommended  2   Subtle increased ground glass and nodular densities right lung apex may be inflammatory/infectious  CT follow-up in 3-6 months may be considered to ensure resolution  Additional tiny nodular densities bilaterally appear similar  3   Nonobstructing renal calculi  Workstation performed: [id number]     Xr Chest Portable    Result Date: 5/19/2018  Impression: Interval increased interstitial and hazy airspace densities bilaterally  Workstation performed: [id number]     Xr Chest Portable    Result Date: 5/19/2018  Impression: No acute cardiopulmonary disease  Workstation performed: [id number]     Xr Chest Portable    Result Date: 5/19/2018  Impression: No change from 5/17/2018 Workstation performed: [id number]     Xr Chest Portable    Result Date: 5/18/2018  Impression: No acute cardiopulmonary disease   Workstation performed: [id number]     Xr Chest Portable    Result Date: 5/16/2018  Impression: Very small left effusion with bibasilar subsegmental atelectasis Workstation performed: [id number]     Xr Chest Portable    Result Date: 5/15/2018  Impression: No acute cardiopulmonary disease  Findings are stable  Findings are consistent with emergency provider's preliminary reading Workstation performed: [id number]     Xr Chest Portable Icu    Result Date: 5/20/2018  Impression: Improved aeration left lung  Persistent right infrahilar opacity is likely atelectasis  Workstation performed: [id number]     EKG:   Micro:  Lab Results   Component Value Date    BLOODCX No Growth After 5 Days  05/15/2018    BLOODCX No Growth After 5 Days  05/15/2018     Allergies:    Allergies   Allergen Reactions    Other      Brown cloth band aids        Medications:   Scheduled Meds:    Current Facility-Administered Medications:  acetaminophen 650 mg Rectal Q6H PRN Sonia Restrepo MD    amiodarone 0 5 mg/min Intravenous Continuous Heath Finn PA-C Last Rate: 0 5 mg/min (05/24/18 0857)   aspirin 81 mg Oral Daily Gissel Martinez, DO    atorvastatin 40 mg Oral Daily With Pattie Cason MD    budesonide-formoterol 2 puff Inhalation BID Ephrinarinder Castillo MD    dexmedetomidine 0 1-0 7 mcg/kg/hr Intravenous Titrated Aura Colon PA-C Last Rate: Stopped (05/24/18 5674)   diazepam 5 mg Oral Q6H Gissel Martinez, DO    folic acid 466 mcg Oral Daily Gissel Michelle, DO    heparin (porcine) 5,000 Units Subcutaneous Q8H [facility] Aura Colon PA-C    HYDROmorphone 0 5 mg Intravenous Q1H PRN Britni Canela PA-C    insulin lispro 5-25 Units Subcutaneous Q6H [facility] Heath Finn PA-C    ipratropium 0 5 mg Nebulization Q6H Danyell Saavedra, DO    levalbuterol 1 25 mg Nebulization Q6H Danyell Keri, DO    LORazepam 0 5 mg Intravenous Q2H PRN Gissel Martinez, DO    Or        LORazepam 1 mg Intravenous Q2H PRN Gissel Martinez, DO    Or        LORazepam 2 mg Intravenous Q2H PRN Gissel Martinez, DO    [START ON 5/25/2018] methylPREDNISolone sodium succinate 40 mg Intravenous Q24H [address] Wilber Nielson MD    metoprolol 5 mg Intravenous Q6H PRN Alejandro Millan PA-C    metoprolol tartrate 25 mg Oral Q12H [address] Dannis Piety, DO    nicotine 14 mg Transdermal Daily Emilieayde Anival Maxim Early    [START ON 5/25/2018] pantoprazole 40 mg Oral Early Morning Dannis Piety, DO    piperacillin-tazobactam 3 375 g Intravenous Q6H Dannis Piety, DO Last Rate: Stopped (05/24/18 1201)   thiamine 100 mg Oral Daily Dannis Piety, DO      Continuous Infusions:    amiodarone 0 5 mg/min Last Rate: 0 5 mg/min (05/24/18 0857)   dexmedetomidine 0 1-0 7 mcg/kg/hr Last Rate: Stopped (05/24/18 0918)     PRN Meds:    acetaminophen 650 mg Q6H PRN   HYDROmorphone 0 5 mg Q1H PRN   LORazepam 0 5 mg Q2H PRN   Or     LORazepam 1 mg Q2H PRN   Or     LORazepam 2 mg Q2H PRN   metoprolol 5 mg Q6H PRN       VTE Pharmacologic Prophylaxis: Heparin  VTE Mechanical Prophylaxis: sequential compression device    Kayode Rodriguez MD

## 2018-05-24 NOTE — PROGRESS NOTES
Cardiology Progress Note - Laurent Osgood 56 y o  male MRN: [id number]    [facility]: [id number]      Assessment:  Principal Problem:    Acute respiratory failure with hypoxia [facility])  Active Problems:    Colitis    Coronary artery disease involving native coronary artery of native heart    COPD with exacerbation ([facility] )    Acute kidney failure ([facility] )    Alcohol abuse    Tobacco abuse    Alcohol dependence with withdrawal ([facility] )    Agitation    Heart failure, systolic, due to idiopathic cardiomyopathy ([place] [address] )    Free intraperitoneal air    Presence of ileostomy ([place] [address] )    Moderate protein-calorie malnutrition (HCC)    Assessment and plan     1  Atrial fibrillation with RVR:  Now sinus rhythm  Continue with amiodarone drip till the patient can take oral medications and switched to oral amiodarone  Restart beta-blockers when able  Atrial fibrillation likely in the setting of alcohol withdrawal and abdominal issues  Keep potassium more than 4 5, Mg > 2 5  [id number] score is 2,  restart anticoagulation with Eliquis 5 mg b i d when cleared by surgery  2  New onset cardiomyopathy - Echocardiogram on 05/19/2018 revealed a new drop in LVEF 25% with diffuse hypokinesis with regional variations, this is new compared to 2016  Could be related to tachycardia Afib versus alcoholic cardiomyopathy versus secondary to acute sepsis/abdominal issues  On EKG he has old septal Q-waves and minimal ST depressions in the inferior leads  Mild troponin elevation related to demand ischemia  Repeat a limited echo  for function after acute medical issues have resolved  Had some iatrogenic volume overload as received IVF during and after surgery  IV lasix as needed  3 History of CAD: Inferior MI status post RCA stent in 2012  Cardiac catheterization in 2016 for inferior ST elevations revealed patent RCA stent  Was treated for pericarditis with colchicine and aspirin at this time    Restart aspirin, atorvastatin when able to take oral medications  He is noncompliant with his medications  4   Transverse colon perforation - underwent Ex-lap, ileocolectomy, iliocolonic anastamosis, EDITH, loop iliostomy and wound vac placement  On IV antibiotic  4  Alcohol abuse:  Being treated for alcohol withdrawal with DTs   5  Hypertension:  BP controlled  6  Hyperlipidemia:On atorvastatin 40 HS  7  History of Crohn's disease  8  Mild aortic stenosis    Subjective:   Patient seen and examined bedside  No new complaints  Denies chest pain, sob, palpitations  Telemetry:  Normal sinus rhythm  Short episodes of Atach overnight  Objective:     Vitals: Blood pressure 103/68, pulse 74, temperature 98 8 °F (37 1 °C), temperature source Oral, resp  rate 14, height 5' 6" (1 676 m), weight 85 5 kg (188 lb 7 9 oz), SpO2 100 %  , Body mass index is 30 42 kg/m² ,   Orthostatic Blood Pressures      Most Recent Value   Blood Pressure  103/68 filed at 05/23/2018 0750   Patient Position - Orthostatic VS  Lying filed at 05/23/2018 0750            Intake/Output Summary (Last 24 hours) at 05/24/18 0914  Last data filed at 05/24/18 0801   Gross per 24 hour   Intake          1514 68 ml   Output             4195 ml   Net         -2680 32 ml     Physical Exam:    GEN: Awake  HEENT: anicteric, mucous membranes moist  NECK:  Unable to appreciate JVP  HEART:  Regular, no murmur heard  LUNGS:  Decreased breath sound at lung bases  ABDOMEN: soft  EXTREMITIES:  1+ edema  NEURO:  Awake and oriented    Current Facility-Administered Medications:     acetaminophen (TYLENOL) rectal suppository 650 mg, 650 mg, Rectal, Q6H PRN, Axel Vaughan MD, 650 mg at 05/23/18 0038    amiodarone (CORDARONE) 900 mg in dextrose 5 % 500 mL infusion, 0 5 mg/min, Intravenous, Continuous, Kemi Finn PA-C, Last Rate: 16 7 mL/hr at 05/24/18 0857, 0 5 mg/min at 05/24/18 0857    atorvastatin (LIPITOR) tablet 40 mg, 40 mg, Oral, Daily With Johnathon Marquez MD, Stopped at 05/22/18 1614    budesonide (PULMICORT) inhalation solution 0 5 mg, 0 5 mg, Nebulization, Q12H, Boaz Godwin PA-C, 0 5 mg at 05/24/18 0739    dexmedetomidine (PRECEDEX) 200 mcg in sodium chloride 0 9 % 50 mL infusion, 0 1-0 7 mcg/kg/hr, Intravenous, Titrated, Aura Colon PA-C, Last Rate: 2 2 mL/hr at 05/24/18 0853, 0 1 mcg/kg/hr at 05/24/18 0853    diazepam (VALIUM) injection 5 mg, 5 mg, Intravenous, Q6H, Gissel Martinez DO, 5 mg at 73/82/07 7583    folic acid 1 mg in sodium chloride 0 9 % 50 mL IVPB, 1 mg, Intravenous, Daily, EBONY DAILY, Stopped at 05/23/18 1804    heparin (porcine) subcutaneous injection 5,000 Units, 5,000 Units, Subcutaneous, Q8H [address], Aura Colon PA-C, 5,000 Units at 05/24/18 0546    HYDROmorphone (DILAUDID) injection 0 5 mg, 0 5 mg, Intravenous, Q1H PRN, EBONY DAILY, 0 5 mg at 05/23/18 0500    insulin lispro (HumaLOG) 100 units/mL subcutaneous injection 5-25 Units, 5-25 Units, Subcutaneous, Q6H [address], 5 Units at 05/23/18 1810 **AND** Fingerstick Glucose (POCT), , , Q6H, Heath Finn PA-C    ipratropium (ATROVENT) 0 02 % inhalation solution 0 5 mg, 0 5 mg, Nebulization, Q6H, Danyell Bratis, DO, 0 5 mg at 05/24/18 0739    levalbuterol (XOPENEX) inhalation solution 1 25 mg, 1 25 mg, Nebulization, Q6H, Danyell Bratis, DO, 1 25 mg at 05/24/18 0738    LORazepam (ATIVAN) 2 mg/mL injection 0 5 mg, 0 5 mg, Intravenous, Q2H PRN **OR** LORazepam (ATIVAN) 2 mg/mL injection 1 mg, 1 mg, Intravenous, Q2H PRN **OR** LORazepam (ATIVAN) 2 mg/mL injection 2 mg, 2 mg, Intravenous, Q2H PRN, Gissel Martinez DO    methylPREDNISolone sodium succinate (Solu-MEDROL) injection 40 mg, 40 mg, Intravenous, Q12H JUV[place], Aura Colon PA-C, 40 mg at 05/24/18 0858    metoprolol (LOPRESSOR) injection 5 mg, 5 mg, Intravenous, Q6H PRN, Heath Finn PA-C, 5 mg at 05/19/18 1152    nicotine (NICODERM CQ) 14 mg/24hr TD 24 hr patch 14 mg, 14 mg, Transdermal, Daily, AMALIA Cisneros, 14 mg at 05/24/18 0856    pantoprazole (PROTONIX) injection 40 mg, 40 mg, Intravenous, Early Morning, Britni Canela PA-C, 40 mg at 05/24/18 0546    piperacillin-tazobactam (ZOSYN) 3 375 g in sodium chloride 0 9 % 50 mL IVPB, 3 375 g, Intravenous, Q6H, An Eason DO, Stopped at 05/24/18 0601    thiamine (VITAMIN B1) 100 mg in sodium chloride 0 9 % 50 mL IVPB, 100 mg, Intravenous, Daily, Fern Newell PA-C, Last Rate: 100 mL/hr at 05/24/18 0905, 100 mg at 05/24/18 0905    Labs & Results:    Lab Results   Component Value Date    CKTOTAL 295 05/15/2018    CKMBINDEX 3 1 (H) 05/15/2018    TROPONINI 0 12 (H) 05/20/2018    TROPONINI 0 17 (H) 05/20/2018    TROPONINI 0 17 (H) 05/20/2018       Lab Results   Component Value Date    GLUCOSE 126 05/24/2018    CALCIUM 7 7 (L) 05/24/2018     05/24/2018    K 4 6 05/24/2018    CO2 34 (H) 05/24/2018     05/24/2018    BUN 18 05/24/2018    CREATININE 0 62 05/24/2018       Lab Results   Component Value Date    WBC 13 39 (H) 05/24/2018    HGB 9 0 (L) 05/24/2018    HCT 27 9 (L) 05/24/2018    MCV 99 (H) 05/24/2018     05/24/2018       Results from last 7 days  Lab Units 05/23/18  0445   INR  1 28*       Lab Results   Component Value Date    CHOL 145 05/20/2018    CHOL 131 12/08/2016     Lab Results   Component Value Date    HDL 67 (H) 05/20/2018    HDL 60 12/08/2016     Lab Results   Component Value Date    LDLCALC 44 05/20/2018    LDLCALC 47 12/08/2016     Lab Results   Component Value Date    TRIG 171 (H) 05/20/2018    TRIG 120 12/08/2016       Lab Results   Component Value Date    ALT 50 05/23/2018    AST 40 05/23/2018

## 2018-05-24 NOTE — PROGRESS NOTES
Progress Note - ICU Transfer to SD/MS tele   Sherrell Aguilar 56 y o  male MRN: [id number]  [address] [facility]   Unit/Bed#: MICU 06 Encounter: [id number]    Code Status: Level 1 - Full Code    Reason for ICU admission: Hypotension requiring vasopressors with acute kidney injury, significant metabolic derangements, elevated troponin and lactic acidosis  Active problems:   Principal Problem:    COPD with exacerbation ([facility] [address] )  Active Problems:    Colitis    Coronary artery disease involving native coronary artery of native heart    Leukocytosis    Dyslipidemia    Acute kidney failure (HCC)    Alcohol abuse    Tobacco abuse    Alcohol dependence with withdrawal (HCC)    Acute respiratory insufficiency    Agitation    Heart failure, systolic, due to idiopathic cardiomyopathy (HCC)    Presence of ileostomy (HCC)    Moderate protein-calorie malnutrition (HCC)    Anemia    Hypocalcemia  Resolved Problems:    Shock ([facility] [address] )    Lactic acidosis    High anion gap metabolic acidosis    Transaminitis    Elevated troponin    Hypokalemia    Hyponatremia    Bronchospasm, acute    Free intraperitoneal air      Consultants: Cardiology, Colorectal Surgery      History of Present Illness: Per H&P written by AMALIA Romero-     "Sherrell Aguilar is a 56 y o  male who presents on 05/15 from home with complaint of malaise and weakness as well as incontinence of stool/urine due to inability to get off his couch  He has a past medical history significant for COPD, hypertension, coronary artery disease status post PCI to RCA in 2012, history of a right hemicolectomy secondary to unknown reason, alcohol abuse, tobacco abuse and ulcerative colitis/Crohn's  Per the patient he has had several episodes of emesis daily for at least the past month    He denies any increasing diarrhea or nausea, reports shortness of breath is at baseline, denies any fevers, chest pain, any difficulties voiding and reports urinating 3 to 4 times a day, he does endorse decreased appetite eating approximately 2 meals a day, he reports smoking cigars for 3-4 hours daily, drinking at least 1 bottle of kenji a day  In regards to his alcohol abuse he states that he has not stopped drinking for any significant amount of time and is unclear regarding withdrawal symptoms or seizures  In the emergency room he was significantly short of breath and received an extended nebulizer treatment with good effect  He was profoundly hypotensive and was resuscitated with 3 L of crystalloid, and ultimately started on norepinephrine  He had an acute kidney injury, significant metabolic derangements, an elevated troponin, and a lactic acidosis  For this reason he is being referred to the critical care unit for admission  History obtained from chart review and the patient "    Summary of clinical course:     Initially, for shock presumed to be septic with a GI source versus hypovolemic, the patient was fluid resuscitated and Levophed was continued  His lactic acidosis resolved and the patient was successesfully taken off pressors, maintaining a MAP >65  DEBBY resolved with IV fluid hydration  Antibiotics were continued initially with vancomycin cefepime and flagyl, which were discontinued after the procalcitonin resulted less than 0 5 and blood cultures were negative  The patient also presented with acute encephalopathy secondary to acute alcohol withdrawal and DTs, was started on scheduled Serax with CIWA protocol, encephalopathy resolved and he is currently on scheduled Valium  Thiamine and folate were started  Ammonia was elevated, and lactulose was given for a period of time during ICU stay  This admission, he developed new onset atrial fibrillation with RVR s/p synchronized cardioversion once, s/p Cardizem and digoxin load, and was started on an amiodarone drip  On the day of transfer he has been rate controlled and in sinus rhythm on amiodarone drip   An ECHO was performed which showed a new drop in LVEF to 25% with diffuse hypokinesis with regional variations, new compared to 2016  He had a mild troponin elevation likely secondary to demand ischemia  EKG showed old septal Q waves and ST depressions in inferior leads  With history of inferior MI s/p RCA stent in 2012, aspirin, atorvastatin and metoprolol were continued  He was started on Eliquis for new onset Afib, which was held prior to ex lap (described below )       On 5/19 he was intubated due to acute hypoxic respiratory failure secondary to COPD exacerbation with severe bronchospasm  He was extubated on 5/21  He was receiving Xopenex and Atrovent, Pulmicort, and Solumedrol 40 mg every 8 hours, which was decreased to every 12 hours yesterday and daily today  Pulmicort was discontinued today and Symbicort was started  On 5/22 free air was identified under the diaphragm on chest XR in the setting of history of Crohn's disease- colorectal surgery was consulted and he was taken to the OR for an exploratory laparotomy  A perforation was identified in the transverse colon with ischemic bowel, and he had an ileocolectomy, ileocolic anastomosis, and loop ileostomy, and Zosyn every 6 hours was started  He was re-intubated in the OR and extubated on 5/23  Of note, this admission he has been noted to have a stable thrombocytopenia, improved after 2 units platelets in OR  Recent or scheduled procedures:   5/19- Intubated  5/21- Extubated  5/22- Right arterial line insertion   5/22- Exploratory laparotomy, ileocolectomy, ileocolonic anastomosis, loop ileostomy, repair or serosal tear, extensive lysis of adhesions, wound vac placement  5/23- Extubated (Intubated for OR)    Outstanding/pending diagnostics: Tissue pathology from surgery (colon)     Cultures: Blood cultures 5/15- No growth 5 days; C diff negative;          Mobilization Plan: Out of bed as tolerated with assistance     Nutrition Plan: Clears, advance per colorectal surgery     Discharge Plan:     Initial /Plan:   "Pt on bi-pap and resting  CM spoke with pt daughter Johanna Espinoza [phone]  Pt resides with his daughter and her 3 adult children  Pt has 0 KAREN  Bedroom on 1st floor and bathroom on 2nd floor  Pt independent with ADLs and ambulation PTA  Pt does not work and is able to drive  Pt daughter works full time  Pt has hx of VNA  No hx of STR  No hx of MH  No hx of drug abuse  Linoantonette Roberto reports pt drinks a bottle of liquor daily - no hx of treatment  Pt has no POA or LW  Pharmacy is [facility]       CM to follow      CM reviewed d/c planning process including the following: identifying help at home, patient preference for d/c planning needs, Discharge Lounge, [facility] Meds to Bed program, availability of treatment team to discuss questions or concerns patient and/or family may have regarding understanding medications and recognizing signs and symptoms once discharged  CM also encouraged patient to follow up with all recommended appointments after discharge  Patient advised of importance for patient and family to participate in managing patients medical well being "      Spoke with [facility] resident regarding transfer, accepting physician Dr Marla Cisneros  Please call [phone] with any questions or concerns  Portions of the record may have been created with voice recognition software  Occasional wrong word or "sound a like" substitutions may have occurred due to the inherent limitations of voice recognition software  Read the chart carefully and recognize, using context, where substitutions have occurred      Katerin Gallego DO

## 2018-05-24 NOTE — PROGRESS NOTES
Progress Note - Colorectal Surgery   Mansi Kwan 56 y o  male MRN: [id number]  Unit/Bed#: [facility] 06 Encounter: [id number]    Assessment:  57 yo M with perforated transverse colon 2/2 bowel ischemia s/p ex lap, ileocolectomy with primary anastomosis, diverting ileostomy, fascial closure, SANTA x2 drain placement 5/22    -ngt over 1 0L output  -ostomy with hematoma but appears viable  -no bowel function     Plan:  - NPO/NGT  -clamp NGT for now  -ok for clears over NGT   -antibiotics for 5 days due to gross spillage in OR  - plan for VAC takedown and skin closure 5/25      Subjective/Objective     Subjective:      Objective:     Vitals: Blood pressure 103/68, pulse 82, temperature 98 8 °F (37 1 °C), temperature source Oral, resp  rate 20, height 5' 6" (1 676 m), weight 85 5 kg (188 lb 7 9 oz), SpO2 (!) 75 %  ,Body mass index is 30 42 kg/m²  I/O       05/21 0701 - 05/22 0700 05/22 0701 - 05/23 0700    P  O  300     I V  (mL/kg) 452 2 (5 2) 6211 3 (69 3)    Blood  950    NG/     IV Piggyback 200 845    Feedings 196     Total Intake(mL/kg) 1248 2 (14 4) 8006 3 (89 4)    Urine (mL/kg/hr) 2120 (1) 1145 (0 5)    Emesis/NG output 0 (0) 170 (0 1)    Drains  265 (0 1)    Stool 0 (0) 175 (0 1)    Blood  550 (0 3)    Total Output 2120 2305    Net -871 8 +5701 3          Unmeasured Urine Occurrence 1 x 1 x    Unmeasured Stool Occurrence 3 x 1 x          Physical Exam:    Alert  Irritable  Poor insight  Soft, non distended, non tender          Lab, Imaging and other studies:   CBC with diff:   Lab Results   Component Value Date    WBC 13 39 (H) 05/24/2018    HGB 9 0 (L) 05/24/2018    HCT 27 9 (L) 05/24/2018    MCV 99 (H) 05/24/2018     05/24/2018    MCH 31 8 05/24/2018    MCHC 32 3 05/24/2018    RDW 17 2 (H) 05/24/2018    MPV 10 2 05/24/2018    NRBC 0 05/24/2018   , BMP/CMP:   Lab Results   Component Value Date     05/24/2018    K 4 6 05/24/2018     05/24/2018    CO2 34 (H) 05/24/2018    ANIONGAP 4 05/24/2018    BUN 18 05/24/2018    CREATININE 0 62 05/24/2018    GLUCOSE 126 05/24/2018    CALCIUM 7 7 (L) 05/24/2018    EGFR 112 05/24/2018   , Magnesium: No results found for: MAG  VTE Pharmacologic Prophylaxis: Sequential compression device (Venodyne)   VTE Mechanical Prophylaxis: sequential compression device

## 2018-05-25 LAB
ABO GROUP BLD BPU: NORMAL
BPU ID: NORMAL
CROSSMATCH: NORMAL
GLUCOSE SERPL-MCNC: 116 MG/DL (ref 65–140)
GLUCOSE SERPL-MCNC: 172 MG/DL (ref 65–140)
GLUCOSE SERPL-MCNC: 92 MG/DL (ref 65–140)
GLUCOSE SERPL-MCNC: 95 MG/DL (ref 65–140)
GLUCOSE SERPL-MCNC: 97 MG/DL (ref 65–140)
UNIT DISPENSE STATUS: NORMAL
UNIT PRODUCT CODE: NORMAL
UNIT RH: NORMAL

## 2018-05-25 PROCEDURE — 94644 CONT INHLJ TX 1ST HOUR: CPT

## 2018-05-25 PROCEDURE — 82948 REAGENT STRIP/BLOOD GLUCOSE: CPT

## 2018-05-25 PROCEDURE — 94760 N-INVAS EAR/PLS OXIMETRY 1: CPT

## 2018-05-25 PROCEDURE — 94668 MNPJ CHEST WALL SBSQ: CPT

## 2018-05-25 PROCEDURE — 94640 AIRWAY INHALATION TREATMENT: CPT

## 2018-05-25 PROCEDURE — 99232 SBSQ HOSP IP/OBS MODERATE 35: CPT | Performed by: INTERNAL MEDICINE

## 2018-05-25 PROCEDURE — 99233 SBSQ HOSP IP/OBS HIGH 50: CPT | Performed by: INTERNAL MEDICINE

## 2018-05-25 RX ORDER — DIAZEPAM 5 MG/1
5 TABLET ORAL EVERY 8 HOURS
Status: DISCONTINUED | OUTPATIENT
Start: 2018-05-25 | End: 2018-05-26

## 2018-05-25 RX ORDER — ACETAMINOPHEN 325 MG/1
650 TABLET ORAL EVERY 6 HOURS PRN
Status: DISCONTINUED | OUTPATIENT
Start: 2018-05-25 | End: 2018-06-01 | Stop reason: HOSPADM

## 2018-05-25 RX ORDER — METOPROLOL SUCCINATE 50 MG/1
50 TABLET, EXTENDED RELEASE ORAL DAILY
Status: DISCONTINUED | OUTPATIENT
Start: 2018-05-26 | End: 2018-06-01 | Stop reason: HOSPADM

## 2018-05-25 RX ORDER — AMIODARONE HYDROCHLORIDE 200 MG/1
200 TABLET ORAL 2 TIMES DAILY WITH MEALS
Status: DISCONTINUED | OUTPATIENT
Start: 2018-05-25 | End: 2018-06-01 | Stop reason: HOSPADM

## 2018-05-25 RX ORDER — OXYCODONE HYDROCHLORIDE 5 MG/1
5 TABLET ORAL EVERY 4 HOURS PRN
Status: DISCONTINUED | OUTPATIENT
Start: 2018-05-25 | End: 2018-05-29

## 2018-05-25 RX ADMIN — HEPARIN SODIUM 5000 UNITS: 5000 INJECTION, SOLUTION INTRAVENOUS; SUBCUTANEOUS at 22:38

## 2018-05-25 RX ADMIN — INSULIN LISPRO 5 UNITS: 100 INJECTION, SOLUTION INTRAVENOUS; SUBCUTANEOUS at 00:57

## 2018-05-25 RX ADMIN — HYDROMORPHONE HYDROCHLORIDE 0.5 MG: 1 INJECTION, SOLUTION INTRAMUSCULAR; INTRAVENOUS; SUBCUTANEOUS at 10:12

## 2018-05-25 RX ADMIN — ASPIRIN 81 MG 81 MG: 81 TABLET ORAL at 10:13

## 2018-05-25 RX ADMIN — AMIODARONE HYDROCHLORIDE 200 MG: 200 TABLET ORAL at 18:29

## 2018-05-25 RX ADMIN — BUDESONIDE AND FORMOTEROL FUMARATE DIHYDRATE 2 PUFF: 160; 4.5 AEROSOL RESPIRATORY (INHALATION) at 10:24

## 2018-05-25 RX ADMIN — AMIODARONE HYDROCHLORIDE 200 MG: 200 TABLET ORAL at 10:12

## 2018-05-25 RX ADMIN — ALBUTEROL SULFATE 2 PUFF: 90 AEROSOL, METERED RESPIRATORY (INHALATION) at 05:47

## 2018-05-25 RX ADMIN — IPRATROPIUM BROMIDE 0.5 MG: 0.5 SOLUTION RESPIRATORY (INHALATION) at 13:45

## 2018-05-25 RX ADMIN — PREDNISONE 50 MG: 20 TABLET ORAL at 10:23

## 2018-05-25 RX ADMIN — Medication 3.38 G: at 18:30

## 2018-05-25 RX ADMIN — LEVALBUTEROL HYDROCHLORIDE 1.25 MG: 1.25 SOLUTION, CONCENTRATE RESPIRATORY (INHALATION) at 13:45

## 2018-05-25 RX ADMIN — BUDESONIDE AND FORMOTEROL FUMARATE DIHYDRATE 2 PUFF: 160; 4.5 AEROSOL RESPIRATORY (INHALATION) at 18:30

## 2018-05-25 RX ADMIN — NICOTINE 14 MG: 14 PATCH, EXTENDED RELEASE TRANSDERMAL at 10:13

## 2018-05-25 RX ADMIN — Medication 3.38 G: at 22:40

## 2018-05-25 RX ADMIN — IPRATROPIUM BROMIDE 0.5 MG: 0.5 SOLUTION RESPIRATORY (INHALATION) at 19:38

## 2018-05-25 RX ADMIN — LEVALBUTEROL HYDROCHLORIDE 1.25 MG: 1.25 SOLUTION, CONCENTRATE RESPIRATORY (INHALATION) at 08:43

## 2018-05-25 RX ADMIN — LORAZEPAM 1 MG: 2 INJECTION INTRAMUSCULAR; INTRAVENOUS at 10:12

## 2018-05-25 RX ADMIN — Medication 400 MCG: at 10:23

## 2018-05-25 RX ADMIN — ATORVASTATIN CALCIUM 40 MG: 40 TABLET, FILM COATED ORAL at 18:29

## 2018-05-25 RX ADMIN — METOPROLOL TARTRATE 25 MG: 25 TABLET ORAL at 22:38

## 2018-05-25 RX ADMIN — HYDROMORPHONE HYDROCHLORIDE 0.5 MG: 1 INJECTION, SOLUTION INTRAMUSCULAR; INTRAVENOUS; SUBCUTANEOUS at 16:10

## 2018-05-25 RX ADMIN — DIAZEPAM 5 MG: 2 TABLET ORAL at 22:38

## 2018-05-25 RX ADMIN — Medication 400 MCG: at 10:24

## 2018-05-25 RX ADMIN — IPRATROPIUM BROMIDE 0.5 MG: 0.5 SOLUTION RESPIRATORY (INHALATION) at 08:43

## 2018-05-25 RX ADMIN — Medication 100 MG: at 10:22

## 2018-05-25 RX ADMIN — OXYCODONE HYDROCHLORIDE 5 MG: 5 TABLET ORAL at 18:30

## 2018-05-25 RX ADMIN — Medication 3.38 G: at 11:47

## 2018-05-25 RX ADMIN — Medication 100 MG: at 10:23

## 2018-05-25 RX ADMIN — OXYCODONE HYDROCHLORIDE 5 MG: 5 TABLET ORAL at 22:57

## 2018-05-25 RX ADMIN — Medication 3.38 G: at 06:59

## 2018-05-25 RX ADMIN — METOPROLOL TARTRATE 25 MG: 25 TABLET ORAL at 10:13

## 2018-05-25 RX ADMIN — LEVALBUTEROL HYDROCHLORIDE 1.25 MG: 1.25 SOLUTION, CONCENTRATE RESPIRATORY (INHALATION) at 19:38

## 2018-05-25 NOTE — PLAN OF CARE

## 2018-05-25 NOTE — PROGRESS NOTES
IM Residency Progress Note   Unit/Bed#: [facility] 612-01 Encounter: [id number]  SOD Team A      Cristine Beckman 56 y o  male [id number]    Hospital Stay Days: 10    Assessment/Plan:    Principal Problem:    COPD with exacerbation [facility])  Active Problems:    Colitis    Coronary artery disease involving native coronary artery of native heart    Leukocytosis    Dyslipidemia    Acute kidney failure (HCC)    Alcohol abuse    Tobacco abuse    Alcohol dependence with withdrawal (HCC)    Acute respiratory insufficiency    Agitation    Heart failure, systolic, due to idiopathic cardiomyopathy (HCC)    Presence of ileostomy (HCC)    Moderate protein-calorie malnutrition (HCC)    Anemia    Hypocalcemia    Perforation of colon (HCC)    Acute Hypoxic Respiratory Failure  -Secondary to COPD exacerbation  S/p extubation on 5/23  Currently on 4L NC  Wean as tolerated  -CXR on 5/23 shows persistent left basilar effusion, with possible atelectasis or infection  Also pulmonary vascular congestion   -Continue respiratory protocol, xoponex and atrovent treatments, symbicort   -Tolerating po intake, will switch solumedrol to prednisone 50mg po daily  Currently on day 10 of steroids      Perforated Transverse Colon  -Also with history of Crons/Ulcerative colitis with previous right hemicolectomy  -S/p ex-lap on 5/22 with ileocolectomy, diverting ielostomy, fascial closure and 2x SANTA drains placed   -Continue Zosyn 3 375g iv q6h, day 4/5  -Colorectal surgery on board  Okay for clear liquid diet  Plan for vac takedown and skin closure today  Afib with RVR   -s/p cardioversion on 5/18  Has been in NSR since that time   -Was on amiodarone drip  Transitioned to amiodarone 200mg po bid   -Rate control with metoprolol 25mg po bid   -As per cardiology, patient will not need eliquis unless he develops PAF      New Onset Systolic CHF  -Echo on 9/92/29 shows EF 25% with severe diffuse hypokinesis, consistent with G2DD   Previous echo in 12/2016 showed EF 55% with diffuse hypokinesis  -S/p several doses of lasix in the ICU  Currently not on standing lasix   -Continue lopressor 25mg po bid   -Monitor I/Os, daily weights  NSTEMI type 2  -History of CAD w/ stenting in RCA in 2012   -Continue aspirin 81mg po daily, lipitor 40mg po daily,      Alcohol abuse  -Complicated by withdrawal in the ICU, was weaned off precedex  Continue ativan prn as per [facility] protocol   -Continue thiamine 100mg po daily and folate 400mcg po daily  -Continue valium 5mg po q8h  Will wean as tolerated      Hyperglycemia  -A1c on 5/24/18 was 6 3   -Continue sliding scale insulin and BS checks      HTN  -BPs have been stable      Shock, poa  -Resolved  -Secondary to sepsis vs  Hypovolemia, requiring resuscitation with levophed and massive fluids      DEBBY, poa  -Resolved  -Likely secondary to volume depletion      Thrombocytopenia  -Resolved  -Received 2 units platelets in the OR  Disposition: continue inpatient management    Subjective: This morning patient was complaining of dyspnea, which improved after a neb treatment  He complains of 8/10 abdominal pain, which is improved with oxycodone  Hospital Course: As per NP Jarad and Dr Winston Knowles a 56 y  o  male who presents on 05/15 from home with complaint of malaise and weakness as well as incontinence of stool/urine due to inability to get off his couch   He has a past medical history significant for COPD, hypertension, coronary artery disease status post PCI to RCA in 2012, ulcerative colitis/Crohn's s/p right hemicolectomy, alcohol abuse, and tobacco abuse   Per the patient he has had several episodes of emesis daily for at least the past month  Elvira Pierce denies any increasing diarrhea or nausea, reports shortness of breath is at baseline, denies any fevers, chest pain, any difficulties voiding and reports urinating 3 to 4 times a day, he does endorse decreased appetite eating approximately 2 meals a day, he reports smoking cigars for 3-4 hours daily, drinking at least 1 bottle of kenji a day   In regards to his alcohol abuse he states that he has not stopped drinking for any significant amount of time and is unclear regarding withdrawal symptoms or seizures   In the emergency room he was significantly short of breath and received an extended nebulizer treatment with good effect   He was profoundly hypotensive and was resuscitated with 3 L of crystalloid, and ultimately started on norepinephrine   He had an acute kidney injury, significant metabolic derangements, an elevated troponin, and a lactic acidosis      Patient was admitted to the ICU, and resuscitated with fluids and levophed  His lactic acidosis improved and he was weaned off pressors  His lactic acidosis resolved and the patient was successesfully taken off pressors, maintaining a MAP >65  DEBBY resolved with IV fluid hydration  Antibiotics were continued initially with vancomycin cefepime and flagyl, which were discontinued after the procalcitonin resulted less than 0 5 and blood cultures were negative  The patient also presented with acute encephalopathy secondary to acute alcohol withdrawal and DTs, was started on scheduled Serax with CIWA protocol, encephalopathy resolved and he is currently on scheduled Valium  Thiamine and folate were started  Ammonia was elevated, and lactulose was given for a period of time during ICU stay       This admission, he developed new onset atrial fibrillation with RVR s/p synchronized cardioversion once, s/p Cardizem and digoxin load, and was started on an amiodarone drip  On the day of transfer he has been rate controlled and in sinus rhythm on amiodarone drip  An ECHO was performed which showed a new drop in LVEF to 25% with diffuse hypokinesis with regional variations, new compared to 2016  He had a mild troponin elevation likely secondary to demand ischemia   EKG showed old septal Q waves and ST depressions in inferior leads  With history of inferior MI s/p RCA stent in , aspirin, atorvastatin and metoprolol were continued  He was started on Eliquis for new onset Afib, which was held prior to ex lap (described below )        On  he was intubated due to acute hypoxic respiratory failure secondary to COPD exacerbation with severe bronchospasm  He was extubated on   He was receiving Xopenex and Atrovent, Pulmicort, and Solumedrol 40 mg every 8 hours, which was decreased to every 12 hours yesterday and daily today  Pulmicort was discontinued today and Symbicort was started       On  free air was identified under the diaphragm on chest XR in the setting of history of Crohn's disease- colorectal surgery was consulted and he was taken to the OR for an exploratory laparotomy  A perforation was identified in the transverse colon with ischemic bowel, and he had an ileocolectomy, ileocolic anastomosis, and loop ileostomy, and Zosyn every 6 hours was started  He was re-intubated in the OR and extubated on   He improved clinically and was medically cleared for transfer to [facility] on   Vitals: Temp (24hrs), Av 8 °F (37 1 °C), Min:98 6 °F (37 °C), Max:99 °F (37 2 °C)  Current: Temperature: 98 9 °F (37 2 °C)  Vitals:    18 2033 18 2300 18 0300 18 0600   BP:  128/68 127/79    BP Location:       Pulse:  82 89    Resp:  21 21    Temp:  98 8 °F (37 1 °C) 98 9 °F (37 2 °C)    TempSrc:       SpO2: 99% 97% 95%    Weight:    85 8 kg (189 lb 2 5 oz)   Height:        Body mass index is 30 53 kg/m²  Physical Exam:   General: lying in bed, in minimal distress  HEENT: nc/at, 4L NC  Cardiovascular: reg rate and rhythm  Pulmonary: coarse expiratory wheezes in all lung fields  Abdominal: ileostomy bag, wound vac and 2x SANTA drains in place  Extremities: no pitting edema  Skin: warm and dry  Neuro: alert and oriented    Intake and Outputs:  I/O last 24 hours: In: 1400 [P O :1100;  I V :150; IV Piggyback:150]  Out: 1100 [Urine:905; Drains:145; Stool:50]  Nutrition:        Diet Orders            Start     Ordered    05/24/18 1402  Diet Clear Liquid  Diet effective now     Question Answer Comment   Diet Type Clear Liquid    RD to adjust diet per protocol? Yes        05/24/18 1401        Invasive lines and devices: Invasive Devices     Peripheral Intravenous Line            Peripheral IV 05/23/18 Right Forearm 1 day    Peripheral IV 05/24/18 Right;Upper Arm less than 1 day          Drain            Closed/Suction Drain Left LLQ Bulb 10 Fr  2 days    Closed/Suction Drain Left LLQ Bulb 10 Fr  2 days    Colostomy  RUQ 2 days    Negative Pressure Wound Therapy (V A C ) Abdomen Mid 2 days                 Labs:     Results from last 7 days  Lab Units 05/24/18 0451 05/23/18 0445 05/22/18  1432 05/21/18  0425 05/20/18  0427   WBC Thousand/uL 13 39* 11 28* 7 93  < > 7 06 10 15   HEMOGLOBIN g/dL 9 0* 10 4* 10 8*  < > 11 0* 10 9*   I STAT HEMOGLOBIN   --   --   --   < >  --   --    HEMATOCRIT % 27 9* 32 3* 34 0*  < > 33 9* 32 8*   PLATELETS Thousands/uL 165 151 166  < > 86* 63*   NEUTROS PCT % 87*  --   --   --  71 83*   MONOS PCT % 6  --   --   --  20* 10   MONO PCT MAN %  --  0* 13*  < >  --   --    < > = values in this interval not displayed      Results from last 7 days  Lab Units 05/24/18 0451 05/23/18 0445 05/22/18 1432 05/22/18 0447 05/21/18  0425   SODIUM mmol/L 142 142 141  --  140 144   POTASSIUM mmol/L 4 6 5 1 5 1  --  4 6 3 9   CHLORIDE mmol/L 104 105 105  --  103 105   CO2 mmol/L 34* 34* 33*  --  32 35*   BUN mg/dL 18 20 19  --  20 14   CREATININE mg/dL 0 62 0 69 0 69  --  0 74 0 86   CALCIUM mg/dL 7 7* 7 4* 8 0*  --  7 6* 7 4*   TOTAL PROTEIN g/dL  --  4 7*  --   --  5 7* 5 0*   BILIRUBIN TOTAL mg/dL  --  0 97  --   --  0 74 0 68   ALK PHOS U/L  --  61  --   --  115 131*   ALT U/L  --  50  --   --  51 55   AST U/L  --  40  --   --  47* 50*   GLUCOSE RANDOM mg/dL 126 175* 153*  --  112 124 GLUCOSE, ISTAT   --   --   --   < >  --   --    < > = values in this interval not displayed  Results from last 7 days  Lab Units 05/23/18  0445 05/22/18  1432 05/22/18  0447   MAGNESIUM mg/dL 2 3 1 8 2 2     Lab Results   Component Value Date    PHOS 2 9 05/24/2018    PHOS 2 7 05/23/2018    PHOS 2 7 05/22/2018        Results from last 7 days  Lab Units 05/23/18  0445 05/22/18  1432 05/22/18  0447   INR  1 28* 1 27* 1 14   PTT seconds 29 31  --        0  Lab Value Date/Time   TROPONINI 0 12 (H) 05/20/2018 1753   TROPONINI 0 17 (H) 05/20/2018 1505   TROPONINI 0 17 (H) 05/20/2018 1214   TROPONINI 0 29 (H) 05/19/2018 0438   TROPONINI 0 30 (H) 05/19/2018 0154   TROPONINI 0 32 (H) 05/18/2018 2229   TROPONINI 0 32 (H) 05/18/2018 1909   TROPONINI 1 15 (H) 05/16/2018 1416   TROPONINI 1 24 (H) 05/16/2018 0522   TROPONINI 1 16 (H) 05/16/2018 0209   TROPONINI 1 09 (H) 05/16/2018 0053   TROPONINI 0 85 (H) 05/15/2018 1935   TROPONINI 0 72 (H) 05/15/2018 1728   TROPONINI 0 47 (H) 05/15/2018 1304   TROPONINI 0 02 12/07/2016 1931   TROPONINI 0 04 (H) 12/07/2016 0907   TROPONINI <0 04 05/22/2015 0123       Results from last 7 days  Lab Units 05/22/18  1432 05/22/18  0824   LACTIC ACID mmol/L 1 4 0 8     ABG:  Lab Results   Component Value Date    PHART 7 484 (H) 05/22/2018    XOO6JKU 42 3 05/22/2018    PO2ART 67 8 (L) 05/22/2018    VVK0OMN 31 1 (H) 05/22/2018    BEART 7 0 05/22/2018    SOURCE Line, Arterial 05/22/2018     Imaging: I have personally reviewed pertinent reports  Ct Chest Abdomen Pelvis Wo Contrast    Result Date: 5/15/2018  Impression: 1  Although exam is limited without intravenous contrast, there appears to be wall thickening centered around the sutures in the hepatic flexure of the colon, with associated inflammatory change and infiltration of the adjacent fat  There may be some involvement of the adjacent descending duodenum  Findings may be related to the history of Crohn's disease/ulcerative colitis  Occult tumor not excluded at this time  Follow-up to resolution recommended  2   Subtle increased ground glass and nodular densities right lung apex may be inflammatory/infectious  CT follow-up in 3-6 months may be considered to ensure resolution  Additional tiny nodular densities bilaterally appear similar  3   Nonobstructing renal calculi  Workstation performed: [id number]     Xr Chest Portable    Result Date: 5/19/2018  Impression: Interval increased interstitial and hazy airspace densities bilaterally  Workstation performed: [id number]     Xr Chest Portable    Result Date: 5/19/2018  Impression: No acute cardiopulmonary disease  Workstation performed: [id number]     Xr Chest Portable    Result Date: 5/19/2018  Impression: No change from 5/17/2018 Workstation performed: [id number]     Xr Chest Portable    Result Date: 5/18/2018  Impression: No acute cardiopulmonary disease  Workstation performed: [id number]     Xr Chest Portable    Result Date: 5/16/2018  Impression: Very small left effusion with bibasilar subsegmental atelectasis Workstation performed: [id number]     Xr Chest Portable    Result Date: 5/15/2018  Impression: No acute cardiopulmonary disease  Findings are stable  Findings are consistent with emergency provider's preliminary reading Workstation performed: [id number]     Xr Chest Portable Icu    Result Date: 5/20/2018  Impression: Improved aeration left lung  Persistent right infrahilar opacity is likely atelectasis  Workstation performed: [id number]     EKG:   Micro:  Lab Results   Component Value Date    BLOODCX No Growth After 5 Days  05/15/2018    BLOODCX No Growth After 5 Days  05/15/2018     Allergies:    Allergies   Allergen Reactions    Other      Brown cloth band aids        Medications:   Scheduled Meds:  Current Facility-Administered Medications:  acetaminophen 650 mg Rectal Q6H PRN Sophia Turcios MD    albuterol 2 puff Inhalation Q4H PRN Ibis Rosario DO    amiodarone 200 mg Oral BID With Meals Shira Reilly MD    aspirin 81 mg Oral Daily Carry Gosselin, DO    atorvastatin 40 mg Oral Daily With Luba Harris MD    budesonide-formoterol 2 puff Inhalation BID Ervin Cortez MD    diazepam 5 mg Oral Q6H Carry Gosselin, DO    folic acid 701 mcg Oral Daily Carry Gosselin, DO    heparin (porcine) 5,000 Units Subcutaneous Q8H [address] Aura Colon PA-C    HYDROmorphone 0 5 mg Intravenous Q1H PRN Artur Rodriguez PA-C    insulin lispro 1-6 Units Subcutaneous TID AC Nicol Pimentel MD    insulin lispro 1-6 Units Subcutaneous HS Nicol Pimentel MD    ipratropium 0 5 mg Nebulization Q6H Danyell Bratis, DO    levalbuterol 1 25 mg Nebulization Q6H Danyell Bramanohar,     LORazepam 0 5 mg Intravenous Q2H PRN Carry Gosselin, DO    Or        LORazepam 1 mg Intravenous Q2H PRN Carry Gosselin, DO    Or        LORazepam 2 mg Intravenous Q2H PRN Carry Gosselin, DO    methylPREDNISolone sodium succinate 40 mg Intravenous Q24H [address] Ervin Cortez MD    metoprolol 5 mg Intravenous Q6H PRN Gabe Barrett PA-C    metoprolol tartrate 25 mg Oral Q12H [address] Carry Gosselin, DO    nicotine 14 mg Transdermal Daily AMALIA Polanco    pantoprazole 40 mg Oral Early Morning Carry Gosselin, DO    piperacillin-tazobactam 3 375 g Intravenous Q6H Carry Gosselin, DO Last Rate: 3 375 g (05/25/18 0659)   thiamine 100 mg Oral Daily Carry Gosselin, DO      Continuous Infusions:   PRN Meds:    acetaminophen 650 mg Q6H PRN   albuterol 2 puff Q4H PRN   HYDROmorphone 0 5 mg Q1H PRN   LORazepam 0 5 mg Q2H PRN   Or     LORazepam 1 mg Q2H PRN   Or     LORazepam 2 mg Q2H PRN   metoprolol 5 mg Q6H PRN       VTE Pharmacologic Prophylaxis: Heparin  VTE Mechanical Prophylaxis: sequential compression device    Nicol Pimentel MD

## 2018-05-25 NOTE — PROGRESS NOTES
Progress Note - Colorectal Surgery   Madelin Matta 56 y o  male MRN: [id number]  Unit/Bed#: [address] 612-01 Encounter: [id number]    Assessment:  57 yo M with perforated transverse colon 2/2 bowel ischemia s/p ex lap, ileocolectomy with primary anastomosis, diverting ileostomy, fascial closure, SANTA x2 drain placement 5/22    -ngt removed  -patient had 1 1 L of PO intake  -complaining of shortness of breath, asking me for a breathing treatment for air hunger  -ostomy beginning to function, appears viable    Plan:    -ok for clears  -will advance diet when more stable from respiratory standpoint  -eval wound for possible closure today at bedside  -zosyn for total of 5 days       Subjective/Objective     Subjective:      Objective:     Vitals: Blood pressure 127/79, pulse 89, temperature 98 9 °F (37 2 °C), resp  rate 21, height 5' 6" (1 676 m), weight 85 8 kg (189 lb 2 5 oz), SpO2 95 %  ,Body mass index is 30 53 kg/m²  I/O       05/21 0701 - 05/22 0700 05/22 0701 - 05/23 0700    P  O  300     I V  (mL/kg) 452 2 (5 2) 6211 3 (69 3)    Blood  950    NG/     IV Piggyback 200 845    Feedings 196     Total Intake(mL/kg) 1248 2 (14 4) 8006 3 (89 4)    Urine (mL/kg/hr) 2120 (1) 1145 (0 5)    Emesis/NG output 0 (0) 170 (0 1)    Drains  265 (0 1)    Stool 0 (0) 175 (0 1)    Blood  550 (0 3)    Total Output 2120 2305    Net -871 8 +5701 3          Unmeasured Urine Occurrence 1 x 1 x    Unmeasured Stool Occurrence 3 x 1 x          Physical Exam:    Shortness of breath  Abdomen moderate distension  Compressible  Ostomy viable            Lab, Imaging and other studies:   CBC with diff:   No results found for: WBC, HGB, HCT, MCV, PLT, ADJUSTEDWBC, MCH, MCHC, RDW, MPV, NRBC, BMP/CMP:   No results found for: NA, K, CL, CO2, ANIONGAP, BUN, CREATININE, GLUCOSE, CALCIUM, AST, ALT, ALKPHOS, PROT, ALBUMIN, BILITOT, EGFR, Magnesium: No results found for: MAG  VTE Pharmacologic Prophylaxis: Sequential compression device (Venodyne)   VTE Mechanical Prophylaxis: sequential compression device

## 2018-05-25 NOTE — MEDICAL STUDENT
MEDICAL STUDENT  Inpatient Progress Note for TRAINING ONLY  Not Part of Legal Medical Record       Progress Note - Cristine Beckman 56 y o  male MRN: [id number]    Unit/Bed#: [facility] 612-01 Encounter: [id number]      Assessment/Plan:  COPD  Patient experienced acute COPD exacerbation during hospital stay, intubated on 5/19  Extubated on 5/21  CXR shows effusion on the left side and pulmonary congestion  Currently on 4L nasal cannula  Continue respiratory treatment    Atrial fibrillation with RVR  Patient developed afib on   Patient given Digoxin, Cardizem and Amiodarone 200mg BID  Cardioversion on 5/19  Patient at regular rate and rhythm on Amiodarone     Perforated/ulcerative colon  Patient has a history of ulcerative colitis  5/22  CXR, free air found under the diaphragm  Exploratory surgery showed perforated bowel, resulting in ileocolectomy, ileocolic anastomosis and loop ileostomy  Patient was intubated in the OR and extubated on 5/23  Patient has two drains, a wound vac and ileostomy bag    Alcohol Abuse  Patient presented to hospital with encephalopathy suspected secondary to alcohol withdrawal  CIWA protocol initiated  Patient on Valium     Shock  Suspected septic shock on arrival  Started on Vancomycin  Blood cultures negative, procalcitonin normal  Medication discontinued    DEBBY  Resolved on arrival with fluid resuscitation     Subjective:   Patient reports feeling ok  Shortly prior to time of exam patient had severe shortness of breath and received a nebulizer treatment  Objective:     Vitals: Blood pressure 150/86, pulse 88, temperature 98 6 °F (37 °C), temperature source Oral, resp  rate 20, height 5' 6" (1 676 m), weight 85 8 kg (189 lb 2 5 oz), SpO2 95 %  ,Body mass index is 30 53 kg/m²        Intake/Output Summary (Last 24 hours) at 05/25/18 1104  Last data filed at 05/25/18 0816   Gross per 24 hour   Intake             1734 ml   Output             1075 ml   Net              659 ml       Physical Exam:  Physical Exam   Constitutional: No distress  HENT:   Head: Normocephalic and atraumatic  Cardiovascular: Normal rate, regular rhythm, normal heart sounds and intact distal pulses  Pulmonary/Chest: He is in respiratory distress  He has wheezes  Patient was in acute distress 30-40 mins prior to exam due to shortness of breath  Patient's breathing normalized after treatment  Abdominal: He exhibits distension  There is tenderness  Patient has ileostomy bag, wound vac and two drains post colectomy  Musculoskeletal: He exhibits no edema  Neurological: He is alert  Skin: He is not diaphoretic  Psychiatric: He has a normal mood and affect         Invasive Devices     Peripheral Intravenous Line            Peripheral IV 05/23/18 Right Forearm 1 day    Peripheral IV 05/24/18 Right;Upper Arm less than 1 day          Drain            Closed/Suction Drain Left LLQ Bulb 10 Fr  2 days    Closed/Suction Drain Left LLQ Bulb 10 Fr  2 days    Colostomy  RUQ 2 days    Negative Pressure Wound Therapy (V A C ) Abdomen Mid 2 days                Current Facility-Administered Medications:  acetaminophen 650 mg Oral Q6H PRN Ilana Ferris MD    albuterol 2 puff Inhalation Q4H PRN Edgar Jacques, DO    amiodarone 200 mg Oral BID With Meals Preet Gallegos MD    aspirin 81 mg Oral Daily Arianna Carter DO    atorvastatin 40 mg Oral Daily With Karissa Lyons MD    budesonide-formoterol 2 puff Inhalation BID Mayela Forbes MD    diazepam 5 mg Oral Q8H Shahnaz Hinojosa MD    folic acid 011 mcg Oral Daily Arianna Carter, DO    heparin (porcine) 5,000 Units Subcutaneous Q8H [address] Aura Colon PA-C    HYDROmorphone 0 5 mg Intravenous Q4H PRN Ilana Ferris MD    insulin lispro 1-6 Units Subcutaneous TID AC Ilana Ferris MD    insulin lispro 1-6 Units Subcutaneous HS Ilana Ferris MD    ipratropium 0 5 mg Nebulization Q6H Danyell Saavedra,     levalbuterol 1 25 mg Nebulization Q6H Danyell Saavedra, DO    LORazepam 0 5 mg Intravenous Q2H PRN Lorena Reza, DO    Or        LORazepam 1 mg Intravenous Q2H PRN Lorena Reza, DO    Or        LORazepam 2 mg Intravenous Q2H PRN Lorena Reza, DO    metoprolol 5 mg Intravenous Q6H PRN Xuan Wyatt PA-C    [START ON 5/26/2018] metoprolol succinate 50 mg Oral Daily Angy Cesar MD    metoprolol tartrate 25 mg Oral Q12H [address] Angy Cesar MD    nicotine 14 mg Transdermal Daily AMALIA Lechuga    oxyCODONE 5 mg Oral Q4H PRN Malina Allison MD    pantoprazole 40 mg Oral Early Morning Lorena Reza DO    piperacillin-tazobactam 3 375 g Intravenous Q6H Angy Cesar MD Last Rate: 3 375 g (05/25/18 1147)   predniSONE 50 mg Oral Daily Malina Allison MD    thiamine 100 mg Oral Daily Lorena Reza DO          Lab, Imaging and other studies: I have personally reviewed pertinent reports  Results from last 7 days  Lab Units 05/24/18  0451 05/23/18  0445 05/22/18  1432  05/22/18  0447 05/21/18  0425   SODIUM mmol/L 142 142 141  --  140 144   POTASSIUM mmol/L 4 6 5 1 5 1  --  4 6 3 9   CHLORIDE mmol/L 104 105 105  --  103 105   CO2 mmol/L 34* 34* 33*  --  32 35*   BUN mg/dL 18 20 19  --  20 14   CREATININE mg/dL 0 62 0 69 0 69  --  0 74 0 86   CALCIUM mg/dL 7 7* 7 4* 8 0*  --  7 6* 7 4*   TOTAL PROTEIN g/dL  --  4 7*  --   --  5 7* 5 0*   BILIRUBIN TOTAL mg/dL  --  0 97  --   --  0 74 0 68   ALK PHOS U/L  --  61  --   --  115 131*   ALT U/L  --  50  --   --  51 55   AST U/L  --  40  --   --  47* 50*   GLUCOSE RANDOM mg/dL 126 175* 153*  --  112 124   GLUCOSE, ISTAT   --   --   --   < >  --   --    < > = values in this interval not displayed      VTE Pharmacologic Prophylaxis: Heparin  VTE Mechanical Prophylaxis: sequential compression device

## 2018-05-25 NOTE — PROGRESS NOTES
Progress Note - Pulmonary   Dorette Brunner 56 y o  male MRN: [id number]  Unit/Bed#: [facility] 612-01 Encounter: [id number]    Assessment:  Acute hypoxemic respiratory failure  COPD with acute exacerbation  Coronary artery disease  Systolic heart failure, EF 25% 2/2 idiopathic cardiomyopathy  Tobacco use disorder  Alcohol use disorder  Alcohol dependence  Acute kidney insufficiency  Bowel perforation s/p ileostomy      Plan:  Patient currently in acute exacerbation of COPD  He is intubated for acute hypoxic respiratory failure x2  Now extubated on 4L/m nasal canula  Patient still with rhonchi  Continue with supplemental oxygenation to maintain SpO2 88-92%  Continue with aggressive chest PT, flutter valve and incentive spirometry  Continue with Zosyn IV to complete 5 day course and bronchodilators per protocol  Now on  Symbicort 2 puffs BID, Atrovent and Xopenex  Continue with Solumedrol IV 40 mg today  Can Likely transition to oral prednisone tomorrow to complete 10 day course  Continue with nicotine patch  Please provide adequate pain control and incentive spirometry  Subjective:   Patient seen and examined this morning  He is currently off oxygen  Feeling winded and short of breath  He is complaining of abdominal pain and requesting pain medications  He has chronic cough, which he seems unable able to clear  He denies any chest pain  Objective:     Vitals: Blood pressure 150/86, pulse 88, temperature 98 6 °F (37 °C), temperature source Oral, resp  rate 20, height 5' 6" (1 676 m), weight 85 8 kg (189 lb 2 5 oz), SpO2 95 %  ,Body mass index is 30 53 kg/m²        Intake/Output Summary (Last 24 hours) at 05/25/18 0947  Last data filed at 05/25/18 0816   Gross per 24 hour   Intake          1766 84 ml   Output             1200 ml   Net           566 84 ml       Invasive Devices     Peripheral Intravenous Line            Peripheral IV 05/23/18 Right Forearm 1 day    Peripheral IV 05/24/18 Right;Upper Arm less than 1 day          Drain            Closed/Suction Drain Left LLQ Bulb 10 Fr  2 days    Closed/Suction Drain Left LLQ Bulb 10 Fr  2 days    Colostomy  RUQ 2 days    Negative Pressure Wound Therapy (V A C ) Abdomen Mid 2 days                Physical Exam: General appearance: alert and oriented, in no acute distress, appears older than stated age and cooperative  Lungs: crackles  Chest wall: no tenderness  Heart: regular rate and rhythm, S1, S2 normal, no murmur, click, rub or gallop  Abdomen: abnormal findings:  hypoactive bowel sounds, distended, tender to palpation  Osteomy draining brown liquid stool  Two SANTA drains on left with serosanguinous fluid  Extremities: right dressing on right shin  Skin: Skin color, texture, turgor normal  No rashes or lesions or excoriation - lower leg(s) bilateral     Labs: I have personally reviewed pertinent lab results  No new labs to day  Imaging and other studies: I have personally reviewed pertinent reports     and I have personally reviewed pertinent films in PACS

## 2018-05-25 NOTE — PROGRESS NOTES
Cardiology Progress Note - Wily Burgess 56 y o  male MRN: [id number]    Unit/Bed#: [address] 612-01 Encounter: [id number]      Assessment:  Principal Problem:    COPD with exacerbation ([facility] )  Active Problems:    Colitis    Coronary artery disease involving native coronary artery of native heart    Leukocytosis    Dyslipidemia    Acute kidney failure (HCC)    Alcohol abuse    Tobacco abuse    Alcohol dependence with withdrawal (HCC)    Acute respiratory insufficiency    Agitation    Heart failure, systolic, due to idiopathic cardiomyopathy (HCC)    Presence of ileostomy ([facility])    Moderate protein-calorie malnutrition (HCC)    Anemia    Hypocalcemia    Perforation of colon ([facility] )      Plan:  Patient with no issues overnight  He is in sinus rhythm on telemetry  He is now on oral medication and I will switch his amiodarone to PO  Will start him on amiodarone 200 mg twice a day  He has no chest pain or significant dyspnea  The BMP yesterday was stable  Subjective:   Patient seen and examined  No significant events overnight   negative  Objective:     Vitals: Blood pressure 127/79, pulse 89, temperature 98 9 °F (37 2 °C), resp  rate 21, height 5' 6" (1 676 m), weight 85 8 kg (189 lb 2 5 oz), SpO2 95 %  , Body mass index is 30 53 kg/m² , Orthostatic Blood Pressures      Most Recent Value   Blood Pressure  127/79 filed at 05/25/2018 0300   Patient Position - Orthostatic VS  Lying filed at 05/24/2018 1900      ,      Intake/Output Summary (Last 24 hours) at 05/25/18 0731  Last data filed at 05/25/18 0300   Gross per 24 hour   Intake          1399 98 ml   Output             1100 ml   Net           299 98 ml       No significant arrhythmias seen on telemetry review         Physical Exam:    GEN: Wily Burgess appears well, alert and oriented x 3, pleasant and cooperative   NECK: supple, no carotid bruits, no JVD or HJR  HEART: normal rate, regular rhythm, normal S1 and S2, no murmurs, clicks, gallops or rubs LUNGS:  Reduced breath sounds at the bases  EXTREMITIES: peripheral pulses normal; no clubbing, cyanosis, or edema  SKIN: warm and well perfused, no suspicious lesions on exposed skin    Labs & Results:    Admission on 05/15/2018   No results displayed because visit has over 200 results  Ct Chest Abdomen Pelvis Wo Contrast    Result Date: 5/15/2018  Narrative: CT CHEST, ABDOMEN AND PELVIS WITHOUT IV CONTRAST INDICATION:   sob  Malaise, weakness, incontinence COMPARISON: 12/7/2016 TECHNIQUE: CT examination of the chest, abdomen and pelvis was performed without intravenous contrast   Axial, sagittal, and coronal 2D reformatted images were created from the source data and submitted for interpretation  Radiation dose length product (DLP) for this visit:  727 44 mGy-cm   This examination, like all CT scans performed in the [facility], was performed utilizing techniques to minimize radiation dose exposure, including the use of iterative  reconstruction and automated exposure control  Enteric contrast was not administered  FINDINGS: CHEST LUNGS:  Subtle increased ground glass and nodular densities right lung apex may be inflammatory/infectious  3 mm right lower lung nodular density series 3 image 39, is unchanged  Additional scattered tiny nodular densities bilaterally appear similar  There is no tracheal or endobronchial lesion  PLEURA:  Unremarkable  HEART/GREAT VESSELS:  Unremarkable for patient's age  Coronary artery calcifications  MEDIASTINUM AND ADRIANNA:  Unremarkable  CHEST WALL AND LOWER NECK:   Unremarkable  ABDOMEN LIVER/BILIARY TREE:  Hepatic steatosis  GALLBLADDER:  Tiny layering calcified gallstone  No pericholecystic inflammatory change  SPLEEN:  Unremarkable  PANCREAS:  Unremarkable  ADRENAL GLANDS:  Unremarkable  KIDNEYS/URETERS:  Nonobstructing renal calculi bilaterally measuring up to 6 mm  No hydronephrosis    Stable probable proteinaceous cyst left upper kidney measuring 2 x 1 6 cm  STOMACH AND BOWEL:  Although exam is limited without intravenous contrast, there appears to be wall thickening centered around the sutures in the hepatic flexure of the colon  There is associated inflammatory change and infiltration of the adjacent fat  There may be some involvement of the adjacent descending duodenum  Findings may be related to the history of Crohn's disease/ulcerative colitis  Occult tumor not excluded at this time  No bowel obstruction  APPENDIX:  No findings to suggest appendicitis  ABDOMINOPELVIC CAVITY:  No ascites or free intraperitoneal air  No lymphadenopathy  VESSELS:  Unremarkable for patient's age  PELVIS REPRODUCTIVE ORGANS:  Unremarkable for patient's age  Prostate calcifications  URINARY BLADDER:  Unremarkable  ABDOMINAL WALL/INGUINAL REGIONS:  Unremarkable  OSSEOUS STRUCTURES:  No acute fracture or destructive osseous lesion  Impression: 1  Although exam is limited without intravenous contrast, there appears to be wall thickening centered around the sutures in the hepatic flexure of the colon, with associated inflammatory change and infiltration of the adjacent fat  There may be some involvement of the adjacent descending duodenum  Findings may be related to the history of Crohn's disease/ulcerative colitis  Occult tumor not excluded at this time  Follow-up to resolution recommended  2   Subtle increased ground glass and nodular densities right lung apex may be inflammatory/infectious  CT follow-up in 3-6 months may be considered to ensure resolution  Additional tiny nodular densities bilaterally appear similar  3   Nonobstructing renal calculi  Workstation performed: [id number]     Xr Chest Portable    Result Date: 5/23/2018  Narrative: CHEST INDICATION:   Intubated  COMPARISON:  Chest 5/22/2018 EXAM PERFORMED/VIEWS:  XR CHEST PORTABLE FINDINGS:  Endotracheal tube is present, in satisfactory position with its tip above the level of the amy    Enteric tube is present with its tip extending below the left hemidiaphragm  Cardiomediastinal silhouette appears unremarkable  There is a persistent left basilar opacity and blunting of left costophrenic angle  No additional focal consolidation or pneumothorax  Persistent pulmonary vascular congestion  Osseous structures appear within normal limits for patient age  Impression: 1  Persistent left basilar opacity compatible with effusion and a component of atelectasis or infection  2   Pulmonary vascular congestion  Workstation performed: [id number]     Xr Chest Portable    Result Date: 5/22/2018  Narrative: CHEST INDICATION:   Status post intubation  COMPARISON:  5/22/2018 EXAM PERFORMED/VIEWS:  XR CHEST PORTABLE FINDINGS:  Endotracheal tube is present, in satisfactory position with its tip above the level of the amy  Enteric tube is present with its tip extending below the left hemidiaphragm  Cardiomediastinal silhouette appears unremarkable  There is increased left basilar opacity compatible with small pleural effusion and probable left basilar consolidation on the basis of atelectasis or pneumonia  There is diffusely increased interstitial prominence system with fluid overload  No pneumothorax  Osseous structures appear within normal limits for patient age  Impression: Endotracheal tube in satisfactory position  Vascular congestion  Small left pleural effusion with probable left basilar consolidation due to atelectasis or pneumonia  Workstation performed: [id number]     Xr Chest Portable    Result Date: 5/19/2018  Narrative: CHEST INDICATION:   post intubation  COMPARISON:  5/19/2018 EXAM PERFORMED/VIEWS:  XR CHEST PORTABLE FINDINGS:  ET tube tip is approximately 2 5 cm above the amy  NG tube tip below the diaphragm  Cardiomediastinal silhouette appears unremarkable  Interval increased interstitial and hazy airspace densities bilaterally   Osseous structures appear within normal limits for patient age  Impression: Interval increased interstitial and hazy airspace densities bilaterally  Workstation performed: [id number]     Xr Chest Portable    Result Date: 5/19/2018  Narrative: CHEST INDICATION:   severe respiratory distress r/o Pneumothorax  COMPARISON:  5/19/2018 EXAM PERFORMED/VIEWS:  XR CHEST PORTABLE FINDINGS: Heart shadow is enlarged but unchanged from prior exam  The lungs are clear  No pneumothorax or pleural effusion  Osseous structures appear within normal limits for patient age  Impression: No acute cardiopulmonary disease  Workstation performed: [id number]     Xr Chest Portable    Result Date: 5/19/2018  Narrative: CHEST INDICATION:   hypoxia  COMPARISON:  5/17/2018 EXAM PERFORMED/VIEWS:  XR CHEST PORTABLE FINDINGS: The cardiac silhouette is without change from the prior study  No acute pulmonary disease, no pneumothorax or pleural effusion  Osseous structures appear within normal limits for patient age  Impression: No change from 5/17/2018 Workstation performed: [id number]     Xr Chest Portable    Result Date: 5/18/2018  Narrative: CHEST INDICATION:   hypoxia  COMPARISON:  5/16/2018 EXAM PERFORMED/VIEWS:  XR CHEST PORTABLE FINDINGS: Cardiomediastinal silhouette appears unremarkable  The lungs are clear  No pneumothorax or pleural effusion  Osseous structures appear within normal limits for patient age  Impression: No acute cardiopulmonary disease  Workstation performed: [id number]     Xr Chest Portable    Result Date: 5/16/2018  Narrative: CHEST INDICATION:   Shortness of breath  COMPARISON:  5/15/2018 EXAM PERFORMED/VIEWS:  XR CHEST PORTABLE FINDINGS: The cardiac silhouette is without change from the prior study  A very small left effusion is suspected  Bibasilar atelectasis noted  No pneumothorax or pulmonary edema  Osseous structures appear within normal limits for patient age       Impression: Very small left effusion with bibasilar subsegmental atelectasis Workstation performed: [id number]     Xr Chest Portable    Result Date: 5/15/2018  Narrative: CHEST INDICATION: 71-year-old male, shortness of breath COMPARISON: 12/7/2016 chest x-ray EXAM PERFORMED/VIEWS:  XR CHEST PORTABLE Single image FINDINGS: Cardiomediastinal silhouette appears unremarkable  The lungs are clear  No pneumothorax or pleural effusion  Osseous structures appear within normal limits for patient age  Impression: No acute cardiopulmonary disease  Findings are stable  Findings are consistent with emergency provider's preliminary reading Workstation performed: [id number]     Xr Abdomen 1 Vw Portable    Result Date: 5/22/2018  Narrative: ABDOMEN INDICATION: Instrument miscount in OR  COMPARISON:  10/23/2016 VIEWS:  AP supine FINDINGS: There are surgical drain projecting over the left lateral abdomen and right lower quadrant  No metallic surgical instruments identified  Partially imaged enterogastric feeding tube in the left upper quadrant  There is a nonspecific bowel gas pattern  Visualized osseous structures are unremarkable for the patient's age  Impression: No metallic surgical instrument identified  Workstation performed: [id number]     Xr Chest Portable Icu    Result Date: 5/22/2018  Narrative: CHEST INDICATION:   f/u resp failure  COMPARISON:  Chest x-ray from 5/21/2018  EXAM PERFORMED/VIEWS:  XR CHEST PORTABLE ICU FINDINGS:  Endotracheal tube and nasogastric tube have been removed  Cardiomediastinal silhouette appears unremarkable  The lungs are clear  No pneumothorax or pleural effusion  Osseous structures appear within normal limits for patient age  There is free intraperitoneal air  In retrospect, this was present on the 5/21/2018 chest x-ray, but was obscured by overlying EKG leads  Impression: There is free intraperitoneal air   I personally discussed this study with Arely Abrams on 5/22/2018 at 8:51 AM  Workstation performed: [id number]     Xr Chest Portable Icu    Result Date: 5/21/2018  Narrative: CHEST INDICATION:   RESP FAILURE  COMPARISON:  5/20/2018 EXAM PERFORMED/VIEWS:  XR CHEST PORTABLE ICU FINDINGS: Cardiomegaly noted as before  Endotracheal and nasogastric tubes both in satisfactory position  Mild central congestion  No consolidation or pneumothorax  Small left basilar pleural effusion  Osseous structures appear within normal limits for patient age  Impression: Stable chest  Workstation performed: [id number]     Xr Chest Portable Icu    Result Date: 5/20/2018  Narrative: CHEST INDICATION:   Respiratory failure  COMPARISON:  May 19, 2018 EXAM PERFORMED/VIEWS:  XR CHEST PORTABLE ICU FINDINGS:  Endotracheal tube and nasogastric tube in place  Cardiomediastinal silhouette appears unremarkable  Patchy right infrahilar airspace opacity likely atelectasis  Stable from prior  Improved aeration in the left lung  Osseous structures appear within normal limits for patient age  Impression: Improved aeration left lung  Persistent right infrahilar opacity is likely atelectasis  Workstation performed: [id number]       EKG personally reviewed by Rohan Ward MD      Counseling / Coordination of Care  Total floor / unit time spent today 30 minutes  Greater than 50% of total time was spent with the patient and / or family counseling and / or coordination of care

## 2018-05-25 NOTE — CASE MANAGEMENT
Continued Stay Review    Thank you,  [facility] in the [facility] by [address] for 2017  Network Utilization Review Department  Phone: [phone]; Fax [phone]  ATTENTION: The Network Utilization Review Department is now centralized for our 7 Facilities  Make a note that we have a new phone and fax numbers for our Department  Please call with any questions or concerns to [phone] and carefully follow the prompts so that you are directed to the right person  All voicemails are confidential  Fax any determinations, approvals, denials, and requests for initial or continue stay review clinical to [phone]  Due to HIGH CALL volume, it would be easier if you could please send faxed requests to expedite your requests and in part, help us provide discharge notifications faster  Date: 5/25/2018    Patient transferred from ICU to medical floor on 5/24 - PM     Summary of clinical course:      Initially, for shock presumed to be septic with a GI source versus hypovolemic, the patient was fluid resuscitated and Levophed was continued  His lactic acidosis resolved and the patient was successesfully taken off pressors, maintaining a MAP >65  DEBBY resolved with IV fluid hydration  Antibiotics were continued initially with vancomycin cefepime and flagyl, which were discontinued after the procalcitonin resulted less than 0 5 and blood cultures were negative  The patient also presented with acute encephalopathy secondary to acute alcohol withdrawal and DTs, was started on scheduled Serax with CIWA protocol, encephalopathy resolved and he is currently on scheduled Valium  Thiamine and folate were started   Ammonia was elevated, and lactulose was given for a period of time during ICU stay       This admission, he developed new onset atrial fibrillation with RVR s/p synchronized cardioversion once, s/p Cardizem and digoxin load, and was started on an amiodarone drip  On the day of transfer he has been rate controlled and in sinus rhythm on amiodarone drip  An ECHO was performed which showed a new drop in LVEF to 25% with diffuse hypokinesis with regional variations, new compared to 2016  He had a mild troponin elevation likely secondary to demand ischemia  EKG showed old septal Q waves and ST depressions in inferior leads  With history of inferior MI s/p RCA stent in 2012, aspirin, atorvastatin and metoprolol were continued  He was started on Eliquis for new onset Afib, which was held prior to ex lap (described below )        On 5/19 he was intubated due to acute hypoxic respiratory failure secondary to COPD exacerbation with severe bronchospasm  He was extubated on 5/21  He was receiving Xopenex and Atrovent, Pulmicort, and Solumedrol 40 mg every 8 hours, which was decreased to every 12 hours yesterday and daily today  Pulmicort was discontinued today and Symbicort was started       On 5/22 free air was identified under the diaphragm on chest XR in the setting of history of Crohn's disease- colorectal surgery was consulted and he was taken to the OR for an exploratory laparotomy  A perforation was identified in the transverse colon with ischemic bowel, and he had an ileocolectomy, ileocolic anastomosis, and loop ileostomy, and Zosyn every 6 hours was started  He was re-intubated in the OR and extubated on 5/23       Of note, this admission he has been noted to have a stable thrombocytopenia, improved after 2 units platelets in OR          Recent or scheduled procedures:   5/19- Intubated  5/21- Extubated  5/22- Right arterial line insertion   5/22- Exploratory laparotomy, ileocolectomy, ileocolonic anastomosis, loop ileostomy, repair or serosal tear, extensive lysis of adhesions, wound vac placement      5/23- Extubated (Intubated for OR)    Vital Signs: /86   Pulse 88   Temp 98 6 °F (37 °C) (Oral)   Resp 20   Ht 5' 6" (1 676 m)   Wt 85 8 kg (189 lb 2 5 oz) SpO2 95%   BMI 30 53 kg/m²     Medications:   Scheduled Meds:   Current Facility-Administered Medications:  acetaminophen 650 mg Oral Q6H PRN    albuterol 2 puff Inhalation Q4H PRN 5/25 x 1   amiodarone 200 mg Oral BID With Meals Started on 5/25   aspirin 81 mg Oral Daily    atorvastatin 40 mg Oral Daily With Dinner    budesonide-formoterol 2 puff Inhalation BID    diazepam 5 mg Oral U7G    folic acid 601 mcg Oral Daily    heparin (porcine) 5,000 Units Subcutaneous Q8H [facility]    HYDROmorphone 0 5 mg Intravenous Q4H PRN 5/23 x 2  5/24 x 2 5/25 x 1   insulin lispro 1-6 Units Subcutaneous TID AC    insulin lispro 1-6 Units Subcutaneous HS    ipratropium 0 5 mg Nebulization Q6H    levalbuterol 1 25 mg Nebulization Q6H    LORazepam 0 5 mg Intravenous Q2H PRN    Or       LORazepam 1 mg Intravenous Q2H PRN    Or       LORazepam 2 mg Intravenous Q2H PRN    metoprolol 5 mg Intravenous Q6H PRN    [START ON 5/26/2018] metoprolol succinate 50 mg Oral Daily    metoprolol tartrate 25 mg Oral Q12H JUVENTINO    nicotine 14 mg Transdermal Daily    oxyCODONE 5 mg Oral Q4H PRN    pantoprazole 40 mg Oral Early Morning    piperacillin-tazobactam 3 375 g Intravenous Q6H Last Rate: Stopped (05/25/18 0816)   predniSONE 50 mg Oral Daily    thiamine 100 mg Oral Daily        Amiodarone gtt - d/c'd 5/25 am @ 07:37  precedex gtt - d/c'd on 5/24 @ 16:10  Nursing Orders - Telem - VS q 4 - SANTA drain to bulb suction - Daily weights - neuro checks q 4 - I & O q shift  - Fall precautions - SCD's to le's- Urinary retention protocol - Diet Clears - Abd Binder     Abnormal Labs/Diagnostic Results:      Ref Range & Units 5/24/18 0451 5/23/18 0445 5/22/18 1432 5/22/18 0447 5/21/18 0425   WBC 4 31 - 10 16 Thousand/uL 13 39   11 28   7 93  12 48   7 06    RBC 3 88 - 5 62 Million/uL 2 83   3 21   3 36   3 51   3 37     Hemoglobin 12 0 - 17 0 g/dL 9 0   10 4   10 8   11 3   11 0     Hematocrit 36 5 - 49 3 % 27 9   32 3   34 0   36 3   33 9     MCV 82 - 98 fL 99   101 101   103   101     MCH 26 8 - 34 3 pg 31 8  32 4  32 1  32 2  32 6    MCHC 31 4 - 37 4 g/dL 32 3  32 2  31 8  31 1   32 4    RDW 11 6 - 15 1 % 17 2   17 6   17 1   17 3   17 6     MPV 8 9 - 12 7 fL 10 2  9 7  9 4  10 0  10 2    Platelets 328 - 635 Thousands/uL 165  151  166  89CM   86     nRBC /100 WBCs 0  0CM  1CM  1  2    Neutrophils Relative 43 - 75 % 87      71    Lymphocytes Relative 14 - 44 % 6      5     Monocytes Relative 4 - 12 % 6     20     Eosinophils Relative 0 - 6 % 0     0    Basophils Relative 0 - 1 % 0     0    Neutrophils Absolute 1 85 - 7 62 Thousands/µL 11 68      5 04    Lymphocytes Absolute 0 60 - 4 47 Thousands/µL 0 83     0 33     Monocytes Absolute 0 17 - 1 22 Thousand/µL 0 77     1 39     Eosinophils Absolute 0 00 - 0 61 Thousand/µL 0 00     0 00    Basophils Absolute 0 00 - 0 10 Thousands/µL 0 01     0 02               Ref Range & Units 5/24/18 0451 5/23/18 0445 5/22/18 1432 5/22/18 0447 5/21/18 0425   Sodium 136 - 145 mmol/L 142  142  141  140  144    Potassium 3 5 - 5 3 mmol/L 4 6  5 1  5 1  4 6CM  3 9    Chloride 100 - 108 mmol/L 104  105  105  103  105    CO2 21 - 32 mmol/L 34   34   33   32  35     Anion Gap 4 - 13 mmol/L 4  3   3   5  4    BUN 5 - 25 mg/dL 18  20  19  20  14    Creatinine 0 60 - 1 30 mg/dL 0 62  0 69CM  0 69CM  0 74CM  0 86CM    Glucose 65 - 140 mg/dL 126  175CM   153CM   112CM  124CM    Calcium 8 3 - 10 1 mg/dL 7 7   7 4   8 0   7 6   7 4     eGFR ml/min/1 73sq m 112  107  107  104  98                Age/Sex: 56 y o  male     Assessment/Plan:   [facility]  Note 5/25 -    Assessment:  57 yo M with perforated transverse colon 2/2 bowel ischemia s/p ex lap, ileocolectomy with primary anastomosis, diverting ileostomy, fascial closure, SANTA x2 drain placement 5/22  -ngt removed  -patient had 1 1 L of PO intake  -complaining of shortness of breath, asking me for a breathing treatment for air hunger  -ostomy beginning to function, appears viable  Plan:  -ok for clears  -will advance diet when more stable from respiratory standpoint  -eval wound for possible closure today at bedside  -zosyn for total of 5 days     Pulmonary -note 5/25   Plan:  Patient currently in acute exacerbation of COPD  He is intubated for acute hypoxic respiratory failure x2  Now extubated on 4L/m nasal canula  Patient still with rhonchi  Continue with supplemental oxygenation to maintain SpO2 88-92%  Continue with aggressive chest PT, flutter valve and incentive spirometry  Continue with Zosyn IV to complete 5 day course and bronchodilators per protocol  Now on  Symbicort 2 puffs BID, Atrovent and Xopenex  Continue with Solumedrol IV 40 mg today  Can Likely transition to oral prednisone tomorrow to complete 10 day course  Continue with nicotine patch  Please provide adequate pain control and incentive spirometry      Discharge Plan:  [facility]

## 2018-05-25 NOTE — PROGRESS NOTES
Advance oral diet  as medically appropriate; note that pt was previously recommended for dysphagia level 2 mechanical soft thin liquid features per recs by ST; RD to initiate sugar free gelatein to cl liquid trays to optimize protein intake

## 2018-05-26 ENCOUNTER — APPOINTMENT (INPATIENT)
Dept: RADIOLOGY | Facility: HOSPITAL | Age: 58
DRG: 710 | End: 2018-05-26
Payer: COMMERCIAL

## 2018-05-26 LAB
ANION GAP SERPL CALCULATED.3IONS-SCNC: 5 MMOL/L (ref 4–13)
ARTERIAL PATENCY WRIST A: YES
BASE EXCESS BLDA CALC-SCNC: 5.9 MMOL/L
BASOPHILS # BLD MANUAL: 0 THOUSAND/UL (ref 0–0.1)
BASOPHILS NFR MAR MANUAL: 0 % (ref 0–1)
BUN SERPL-MCNC: 15 MG/DL (ref 5–25)
CA-I BLD-SCNC: 0.93 MMOL/L (ref 1.12–1.32)
CALCIUM SERPL-MCNC: 7.9 MG/DL (ref 8.3–10.1)
CHLORIDE SERPL-SCNC: 102 MMOL/L (ref 100–108)
CO2 SERPL-SCNC: 33 MMOL/L (ref 21–32)
CREAT SERPL-MCNC: 0.71 MG/DL (ref 0.6–1.3)
EOSINOPHIL # BLD MANUAL: 0 THOUSAND/UL (ref 0–0.4)
EOSINOPHIL NFR BLD MANUAL: 0 % (ref 0–6)
ERYTHROCYTE [DISTWIDTH] IN BLOOD BY AUTOMATED COUNT: 16.9 % (ref 11.6–15.1)
GFR SERPL CREATININE-BSD FRML MDRD: 106 ML/MIN/1.73SQ M
GLUCOSE SERPL-MCNC: 139 MG/DL (ref 65–140)
GLUCOSE SERPL-MCNC: 150 MG/DL (ref 65–140)
GLUCOSE SERPL-MCNC: 63 MG/DL (ref 65–140)
GLUCOSE SERPL-MCNC: 78 MG/DL (ref 65–140)
GLUCOSE SERPL-MCNC: 78 MG/DL (ref 65–140)
HCO3 BLDA-SCNC: 30.8 MMOL/L (ref 22–28)
HCT VFR BLD AUTO: 39.1 % (ref 36.5–49.3)
HGB BLD-MCNC: 12.6 G/DL (ref 12–17)
LYMPHOCYTES # BLD AUTO: 0.52 THOUSAND/UL (ref 0.6–4.47)
LYMPHOCYTES # BLD AUTO: 3 % (ref 14–44)
MAGNESIUM SERPL-MCNC: 2.4 MG/DL (ref 1.6–2.6)
MCH RBC QN AUTO: 32 PG (ref 26.8–34.3)
MCHC RBC AUTO-ENTMCNC: 32.2 G/DL (ref 31.4–37.4)
MCV RBC AUTO: 99 FL (ref 82–98)
MONOCYTES # BLD AUTO: 1.72 THOUSAND/UL (ref 0–1.22)
MONOCYTES NFR BLD: 10 % (ref 4–12)
NASAL CANNULA: 6
NEUTROPHILS # BLD MANUAL: 14.98 THOUSAND/UL (ref 1.85–7.62)
NEUTS SEG NFR BLD AUTO: 87 % (ref 43–75)
NRBC BLD AUTO-RTO: 0 /100 WBCS
O2 CT BLDA-SCNC: 16 ML/DL (ref 16–23)
OXYHGB MFR BLDA: 93.2 % (ref 94–97)
PCO2 BLDA: 46.1 MM HG (ref 36–44)
PH BLDA: 7.44 [PH] (ref 7.35–7.45)
PLATELET # BLD AUTO: 216 THOUSANDS/UL (ref 149–390)
PLATELET BLD QL SMEAR: ADEQUATE
PMV BLD AUTO: 10.3 FL (ref 8.9–12.7)
PO2 BLDA: 72.4 MM HG (ref 75–129)
POTASSIUM SERPL-SCNC: 4.4 MMOL/L (ref 3.5–5.3)
RBC # BLD AUTO: 3.94 MILLION/UL (ref 3.88–5.62)
SODIUM SERPL-SCNC: 140 MMOL/L (ref 136–145)
SPECIMEN SOURCE: ABNORMAL
WBC # BLD AUTO: 17.22 THOUSAND/UL (ref 4.31–10.16)

## 2018-05-26 PROCEDURE — 82948 REAGENT STRIP/BLOOD GLUCOSE: CPT

## 2018-05-26 PROCEDURE — 83735 ASSAY OF MAGNESIUM: CPT | Performed by: PHYSICIAN ASSISTANT

## 2018-05-26 PROCEDURE — 80048 BASIC METABOLIC PNL TOTAL CA: CPT | Performed by: PHYSICIAN ASSISTANT

## 2018-05-26 PROCEDURE — 94668 MNPJ CHEST WALL SBSQ: CPT

## 2018-05-26 PROCEDURE — 94640 AIRWAY INHALATION TREATMENT: CPT

## 2018-05-26 PROCEDURE — 71045 X-RAY EXAM CHEST 1 VIEW: CPT

## 2018-05-26 PROCEDURE — 82330 ASSAY OF CALCIUM: CPT | Performed by: STUDENT IN AN ORGANIZED HEALTH CARE EDUCATION/TRAINING PROGRAM

## 2018-05-26 PROCEDURE — 82805 BLOOD GASES W/O2 SATURATION: CPT | Performed by: INTERNAL MEDICINE

## 2018-05-26 PROCEDURE — 99233 SBSQ HOSP IP/OBS HIGH 50: CPT | Performed by: INTERNAL MEDICINE

## 2018-05-26 PROCEDURE — 36600 WITHDRAWAL OF ARTERIAL BLOOD: CPT

## 2018-05-26 PROCEDURE — 94760 N-INVAS EAR/PLS OXIMETRY 1: CPT

## 2018-05-26 PROCEDURE — 99232 SBSQ HOSP IP/OBS MODERATE 35: CPT | Performed by: INTERNAL MEDICINE

## 2018-05-26 PROCEDURE — 85027 COMPLETE CBC AUTOMATED: CPT | Performed by: INTERNAL MEDICINE

## 2018-05-26 PROCEDURE — 94762 N-INVAS EAR/PLS OXIMTRY CONT: CPT

## 2018-05-26 PROCEDURE — 85007 BL SMEAR W/DIFF WBC COUNT: CPT | Performed by: INTERNAL MEDICINE

## 2018-05-26 PROCEDURE — 85025 COMPLETE CBC W/AUTO DIFF WBC: CPT | Performed by: PHYSICIAN ASSISTANT

## 2018-05-26 RX ORDER — DIAZEPAM 5 MG/1
5 TABLET ORAL EVERY 12 HOURS
Status: DISCONTINUED | OUTPATIENT
Start: 2018-05-26 | End: 2018-05-28

## 2018-05-26 RX ORDER — FUROSEMIDE 10 MG/ML
40 INJECTION INTRAMUSCULAR; INTRAVENOUS ONCE
Status: COMPLETED | OUTPATIENT
Start: 2018-05-26 | End: 2018-05-26

## 2018-05-26 RX ORDER — NYSTATIN 100000 [USP'U]/G
POWDER TOPICAL 2 TIMES DAILY
Status: DISCONTINUED | OUTPATIENT
Start: 2018-05-26 | End: 2018-06-01 | Stop reason: HOSPADM

## 2018-05-26 RX ORDER — ALBUTEROL SULFATE 2.5 MG/3ML
SOLUTION RESPIRATORY (INHALATION)
Status: COMPLETED
Start: 2018-05-26 | End: 2018-05-26

## 2018-05-26 RX ADMIN — BUDESONIDE AND FORMOTEROL FUMARATE DIHYDRATE 2 PUFF: 160; 4.5 AEROSOL RESPIRATORY (INHALATION) at 21:43

## 2018-05-26 RX ADMIN — METOPROLOL SUCCINATE 50 MG: 50 TABLET, EXTENDED RELEASE ORAL at 10:39

## 2018-05-26 RX ADMIN — ALBUTEROL SULFATE 2 PUFF: 90 AEROSOL, METERED RESPIRATORY (INHALATION) at 10:45

## 2018-05-26 RX ADMIN — IPRATROPIUM BROMIDE 0.5 MG: 0.5 SOLUTION RESPIRATORY (INHALATION) at 19:36

## 2018-05-26 RX ADMIN — ASPIRIN 81 MG 81 MG: 81 TABLET ORAL at 10:40

## 2018-05-26 RX ADMIN — OXYCODONE HYDROCHLORIDE 5 MG: 5 TABLET ORAL at 12:25

## 2018-05-26 RX ADMIN — LEVALBUTEROL HYDROCHLORIDE 1.25 MG: 1.25 SOLUTION, CONCENTRATE RESPIRATORY (INHALATION) at 14:10

## 2018-05-26 RX ADMIN — Medication 3.38 G: at 05:44

## 2018-05-26 RX ADMIN — LEVALBUTEROL HYDROCHLORIDE 1.25 MG: 1.25 SOLUTION, CONCENTRATE RESPIRATORY (INHALATION) at 19:36

## 2018-05-26 RX ADMIN — Medication 400 MCG: at 10:42

## 2018-05-26 RX ADMIN — FUROSEMIDE 40 MG: 10 INJECTION, SOLUTION INTRAMUSCULAR; INTRAVENOUS at 10:40

## 2018-05-26 RX ADMIN — IPRATROPIUM BROMIDE 0.5 MG: 0.5 SOLUTION RESPIRATORY (INHALATION) at 14:11

## 2018-05-26 RX ADMIN — Medication 3.38 G: at 10:53

## 2018-05-26 RX ADMIN — CALCIUM GLUCONATE 2 G: 98 INJECTION, SOLUTION INTRAVENOUS at 12:03

## 2018-05-26 RX ADMIN — IPRATROPIUM BROMIDE 0.5 MG: 0.5 SOLUTION RESPIRATORY (INHALATION) at 08:32

## 2018-05-26 RX ADMIN — HEPARIN SODIUM 5000 UNITS: 5000 INJECTION, SOLUTION INTRAVENOUS; SUBCUTANEOUS at 13:10

## 2018-05-26 RX ADMIN — ATORVASTATIN CALCIUM 40 MG: 40 TABLET, FILM COATED ORAL at 17:47

## 2018-05-26 RX ADMIN — Medication 3.38 G: at 22:13

## 2018-05-26 RX ADMIN — NYSTATIN: 100000 POWDER TOPICAL at 21:43

## 2018-05-26 RX ADMIN — ALBUTEROL SULFATE 2.5 MG: 2.5 SOLUTION RESPIRATORY (INHALATION) at 04:01

## 2018-05-26 RX ADMIN — BUDESONIDE AND FORMOTEROL FUMARATE DIHYDRATE 2 PUFF: 160; 4.5 AEROSOL RESPIRATORY (INHALATION) at 10:42

## 2018-05-26 RX ADMIN — PANTOPRAZOLE SODIUM 40 MG: 40 TABLET, DELAYED RELEASE ORAL at 05:44

## 2018-05-26 RX ADMIN — AMIODARONE HYDROCHLORIDE 200 MG: 200 TABLET ORAL at 17:47

## 2018-05-26 RX ADMIN — HYDROMORPHONE HYDROCHLORIDE 0.5 MG: 1 INJECTION, SOLUTION INTRAMUSCULAR; INTRAVENOUS; SUBCUTANEOUS at 13:10

## 2018-05-26 RX ADMIN — LEVALBUTEROL HYDROCHLORIDE 1.25 MG: 1.25 SOLUTION, CONCENTRATE RESPIRATORY (INHALATION) at 08:32

## 2018-05-26 RX ADMIN — HEPARIN SODIUM 5000 UNITS: 5000 INJECTION, SOLUTION INTRAVENOUS; SUBCUTANEOUS at 21:44

## 2018-05-26 RX ADMIN — DIAZEPAM 5 MG: 2 TABLET ORAL at 05:44

## 2018-05-26 RX ADMIN — Medication 100 MG: at 10:42

## 2018-05-26 RX ADMIN — Medication 3.38 G: at 17:32

## 2018-05-26 RX ADMIN — AMIODARONE HYDROCHLORIDE 200 MG: 200 TABLET ORAL at 10:39

## 2018-05-26 RX ADMIN — NICOTINE 14 MG: 14 PATCH, EXTENDED RELEASE TRANSDERMAL at 10:39

## 2018-05-26 RX ADMIN — HEPARIN SODIUM 5000 UNITS: 5000 INJECTION, SOLUTION INTRAVENOUS; SUBCUTANEOUS at 05:44

## 2018-05-26 RX ADMIN — PREDNISONE 50 MG: 20 TABLET ORAL at 10:39

## 2018-05-26 NOTE — NURSING NOTE
Overheard pt  screaming help from hallway  Walking into 612, pt  found sitting on the floor next to the bed    Pt  Stated "I was trying to get the candy off the floor "  Charge nurse and Parag Bell RN called to assist

## 2018-05-26 NOTE — PROGRESS NOTES
IM Residency Progress Note   Unit/Bed#: [facility] 631-01 Encounter: [id number]  SOD Team A      Cristine Beckman 56 y o  male [id number]    Hospital Stay Days: 11    Assessment/Plan:    Principal Problem:    COPD with exacerbation [facility])  Active Problems:    Colitis    Coronary artery disease involving native coronary artery of native heart    Leukocytosis    Dyslipidemia    Acute kidney failure (HCC)    Alcohol abuse    Tobacco abuse    Alcohol dependence with withdrawal (HCC)    Acute respiratory insufficiency    Agitation    Heart failure, systolic, due to idiopathic cardiomyopathy (HCC)    Presence of ileostomy (HCC)    Moderate protein-calorie malnutrition (HCC)    Anemia    Hypocalcemia    Perforation of colon (HCC)    Acute Hypoxic Respiratory Failure  -Secondary to COPD exacerbation  S/p extubation on 5/23  -CXR on 5/23 shows persistent left basilar effusion, with possible atelectasis or infection  Also pulmonary vascular congestion   -Continue respiratory protocol, xoponex and atrovent treatments, symbicort   -Continue prednisone 50mg po daily  Currently on day 11 of steroids  -ABG from 5/26 shows mild metabolic alkalosis  -Overnight patient was desaturating after being found w/o his NC on  Responded well after it was replaced   -Continue pulse ox monitoring  -F/u CXR       Perforated Transverse Colon  -Also with history of Crons/Ulcerative colitis with previous right hemicolectomy  -S/p ex-lap on 5/22 with ileocolectomy, diverting ielostomy, fascial closure and 2x SANTA drains placed   -Continue Zosyn 3 375g iv q6h, day 5/5  -Colorectal surgery on board  Okay for dysphagia level 2 diet w/ supplements  Afib with RVR   -s/p cardioversion on 5/18   Has been in NSR since that time   -Continue amiodarone 200mg po bid   -Rate control with metoprolol 25mg po bid   -As per cardiology, patient will not need eliquis unless he develops PAF      New Onset Systolic CHF  -Echo on 0/07/19 shows EF 25% with severe diffuse hypokinesis, consistent with G2DD  Previous echo in 12/2016 showed EF 55% with diffuse hypokinesis  -Continue lopressor 25mg po bid  Will give a dose of 40lasix iv today  Consider starting standing lasix  -Monitor I/Os, daily weights  NSTEMI type 2  -History of CAD w/ stenting in RCA in 2012   -Continue aspirin 81mg po daily, lipitor 40mg po daily,      Hypocalcemia  -Ionized calcium 0 93   -Will give 2g calcium gluconate    Alcohol abuse  -Complicated by withdrawal in the ICU, was weaned off precedex  Continue ativan prn as per CIWA protocol   -Continue thiamine 100mg po daily and folate 400mcg po daily  -Wean valium 5mg po from tid to bid      Hyperglycemia  -A1c on 5/24/18 was 6 3   -Continue sliding scale insulin and BS checks      HTN  -BPs have been stable      Shock, poa  -Resolved  -Secondary to sepsis vs  Hypovolemia, requiring resuscitation with levophed and massive fluids      DEBBY, poa  -Resolved  -Likely secondary to volume depletion      Thrombocytopenia  -Resolved  -Received 2 units platelets in the OR  Disposition: continue inpatient management for copd exacerbation, perforated colon    Subjective:   Overnight patient was found down at the side of his bed, after he tried reaching for candy off the floor  He was continuously found to be desaturating secondary to taking off his NC  A continuous pulse ox was subsequently applied  This morning patient was complaining of dyspnea, improved with breathing treatment  Hospital Course: As per NP Jarad and Dr Analisa Sharp a 56 y  o  male who presents on 05/15 from home with complaint of malaise and weakness as well as incontinence of stool/urine due to inability to get off his couch   He has a past medical history significant for COPD, hypertension, coronary artery disease status post PCI to RCA in 2012, ulcerative colitis/Crohn's s/p right hemicolectomy, alcohol abuse, and tobacco abuse   Per the patient he has had several episodes of emesis daily for at least the past month  [facility] denies any increasing diarrhea or nausea, reports shortness of breath is at baseline, denies any fevers, chest pain, any difficulties voiding and reports urinating 3 to 4 times a day, he does endorse decreased appetite eating approximately 2 meals a day, he reports smoking cigars for 3-4 hours daily, drinking at least 1 bottle of kenji a day   In regards to his alcohol abuse he states that he has not stopped drinking for any significant amount of time and is unclear regarding withdrawal symptoms or seizures   In the emergency room he was significantly short of breath and received an extended nebulizer treatment with good effect   He was profoundly hypotensive and was resuscitated with 3 L of crystalloid, and ultimately started on norepinephrine   He had an acute kidney injury, significant metabolic derangements, an elevated troponin, and a lactic acidosis      Patient was admitted to the ICU, and resuscitated with fluids and levophed  His lactic acidosis improved and he was weaned off pressors  His lactic acidosis resolved and the patient was successesfully taken off pressors, maintaining a MAP >65  DEBBY resolved with IV fluid hydration  Antibiotics were continued initially with vancomycin cefepime and flagyl, which were discontinued after the procalcitonin resulted less than 0 5 and blood cultures were negative  The patient also presented with acute encephalopathy secondary to acute alcohol withdrawal and DTs, was started on scheduled Serax with CIWA protocol, encephalopathy resolved and he is currently on scheduled Valium  Thiamine and folate were started  Ammonia was elevated, and lactulose was given for a period of time during ICU stay       This admission, he developed new onset atrial fibrillation with RVR s/p synchronized cardioversion once, s/p Cardizem and digoxin load, and was started on an amiodarone drip   On the day of transfer he has been rate controlled and in sinus rhythm on amiodarone drip  An ECHO was performed which showed a new drop in LVEF to 25% with diffuse hypokinesis with regional variations, new compared to 2016  He had a mild troponin elevation likely secondary to demand ischemia  EKG showed old septal Q waves and ST depressions in inferior leads  With history of inferior MI s/p RCA stent in , aspirin, atorvastatin and metoprolol were continued  He was started on Eliquis for new onset Afib, which was held prior to ex lap  On  he was intubated due to acute hypoxic respiratory failure secondary to COPD exacerbation with severe bronchospasm  He was extubated on   He was receiving Xopenex and Atrovent, Pulmicort, and Solumedrol 40 mg every 8 hours, which was decreased to every 12 hours yesterday and daily today  Pulmicort was discontinued today and Symbicort was started  On  free air was identified under the diaphragm on chest XR in the setting of history of Crohn's disease- colorectal surgery was consulted and he was taken to the OR for an exploratory laparotomy  A perforation was identified in the transverse colon with ischemic bowel, and he had an ileocolectomy, ileocolic anastomosis, and loop ileostomy, and Zosyn every 6 hours was started  He was re-intubated in the OR and extubated on   He improved clinically and was medically cleared for transfer to [facility] on   On the evening of , patient was found down on the floor at beside  Patient denied hitting head or LOC, states that he was trying to find candy off the floor  Later that evening, he was consistently found to be desaturating secondary to pulling off his nasal cannula  A continuous pulse ox was applied at that time      Vitals: Temp (24hrs), Av 6 °F (37 °C), Min:98 4 °F (36 9 °C), Max:99 °F (37 2 °C)  Current: Temperature: 98 8 °F (37 1 °C)  Vitals:    18 0402 18 0728 18 0810 18 0832   BP:  131/85     BP Location:  Left arm     Pulse:  (!) 107  (!) 110   Resp:  20     Temp:  98 8 °F (37 1 °C)     TempSrc:  Oral     SpO2: 91% 94% 94% 92%   Weight:       Height:        Body mass index is 30 53 kg/m²  Physical Exam:   General: lying in bed, in minimal distress   HEENT: on neb treatment  eomi, ncat  Cardiovascular: tachycardic, no murmurs  Pulmonary: coarse rales b/l  Abdominal: wound vac removed  Ileostomy bag in place  2x SANTA drains with serosanguinous fluid  Extremities: no pitting edema  Skin: warm and dry  Neuro: aox2 5 moves all extremities    Intake and Outputs:  I/O last 24 hours: In: 590 [P O :598; I V :40; IV Piggyback:100]  Out: 2781 [IIRML:1734; Drains:346; Stool:1060]  Nutrition:        Diet Orders            Start     Ordered    05/26/18 0626  Diet Dysphagia/Modified Consistency; Dysphagia 2-Mechanical Soft; Thin Liquid; Sodium 2 Gm  Diet effective now     Question Answer Comment   Diet Type Dysphagia/Modified Consistency    Dysphagia/Modified Consistency Dysphagia 2-Mechanical Soft    Liquid Modifier Thin Liquid    Other Restriction(s): Sodium 2 Gm    RD to adjust diet per protocol? Yes        05/26/18 0626    05/25/18 1533  Dietary nutrition supplements  Once     Question Answer Comment   Select Supplement: Natalie Hall - PRO/90 KCALS    Frequency Breakfast, Lunch, Dinner        05/25/18 1532        Invasive lines and devices:   Invasive Devices     Peripheral Intravenous Line            Peripheral IV 05/24/18 Right;Upper Arm 1 day          Drain            Closed/Suction Drain Left LLQ Bulb 10 Fr  3 days    Closed/Suction Drain Left LLQ Bulb 10 Fr  3 days    Colostomy  RUQ 3 days                 Labs:     Results from last 7 days  Lab Units 05/24/18  0451 05/23/18  0445 05/22/18  1432  05/21/18  0425 05/20/18  0427   WBC Thousand/uL 13 39* 11 28* 7 93  < > 7 06 10 15   HEMOGLOBIN g/dL 9 0* 10 4* 10 8*  < > 11 0* 10 9*   I STAT HEMOGLOBIN   --   --   --   < >  --   --    HEMATOCRIT % 27 9* 32 3* 34 0*  < > 33 9* 32 8*   PLATELETS Thousands/uL 165 151 166  < > 86* 63*   NEUTROS PCT % 87*  --   --   --  71 83*   MONOS PCT % 6  --   --   --  20* 10   MONO PCT MAN %  --  0* 13*  < >  --   --    < > = values in this interval not displayed  Results from last 7 days  Lab Units 05/26/18  0602 05/24/18  0451 05/23/18  0445  05/22/18  0447 05/21/18  0425   SODIUM mmol/L 140 142 142  < > 140 144   POTASSIUM mmol/L 4 4 4 6 5 1  < > 4 6 3 9   CHLORIDE mmol/L 102 104 105  < > 103 105   CO2 mmol/L 33* 34* 34*  < > 32 35*   BUN mg/dL 15 18 20  < > 20 14   CREATININE mg/dL 0 71 0 62 0 69  < > 0 74 0 86   CALCIUM mg/dL 7 9* 7 7* 7 4*  < > 7 6* 7 4*   TOTAL PROTEIN g/dL  --   --  4 7*  --  5 7* 5 0*   BILIRUBIN TOTAL mg/dL  --   --  0 97  --  0 74 0 68   ALK PHOS U/L  --   --  61  --  115 131*   ALT U/L  --   --  50  --  51 55   AST U/L  --   --  40  --  47* 50*   GLUCOSE RANDOM mg/dL 63* 126 175*  < > 112 124   GLUCOSE, ISTAT   --   --   --   < >  --   --    < > = values in this interval not displayed      Results from last 7 days  Lab Units 05/26/18  0602 05/23/18 0445 05/22/18  1432   MAGNESIUM mg/dL 2 4 2 3 1 8     Lab Results   Component Value Date    PHOS 2 9 05/24/2018    PHOS 2 7 05/23/2018    PHOS 2 7 05/22/2018        Results from last 7 days  Lab Units 05/23/18  0445 05/22/18  1432 05/22/18  0447   INR  1 28* 1 27* 1 14   PTT seconds 29 31  --        0  Lab Value Date/Time   TROPONINI 0 12 (H) 05/20/2018 1753   TROPONINI 0 17 (H) 05/20/2018 1505   TROPONINI 0 17 (H) 05/20/2018 1214   TROPONINI 0 29 (H) 05/19/2018 0438   TROPONINI 0 30 (H) 05/19/2018 0154   TROPONINI 0 32 (H) 05/18/2018 2229   TROPONINI 0 32 (H) 05/18/2018 1909   TROPONINI 1 15 (H) 05/16/2018 1416   TROPONINI 1 24 (H) 05/16/2018 0522   TROPONINI 1 16 (H) 05/16/2018 0209   TROPONINI 1 09 (H) 05/16/2018 0053   TROPONINI 0 85 (H) 05/15/2018 1935   TROPONINI 0 72 (H) 05/15/2018 1728   TROPONINI 0 47 (H) 05/15/2018 1304   TROPONINI 0 02 12/07/2016 1931   TROPONINI 0 04 (H) 12/07/2016 0907   TROPONINI <0 04 05/22/2015 0123       Results from last 7 days  Lab Units 05/22/18  1432 05/22/18  0824   LACTIC ACID mmol/L 1 4 0 8     ABG:  Lab Results   Component Value Date    PHART 7 484 (H) 05/22/2018    CST4YZJ 42 3 05/22/2018    PO2ART 67 8 (L) 05/22/2018    JKA2KHR 31 1 (H) 05/22/2018    BEART 7 0 05/22/2018    SOURCE Line, Arterial 05/22/2018     Imaging: I have personally reviewed pertinent reports  Ct Chest Abdomen Pelvis Wo Contrast    Result Date: 5/15/2018  Impression: 1  Although exam is limited without intravenous contrast, there appears to be wall thickening centered around the sutures in the hepatic flexure of the colon, with associated inflammatory change and infiltration of the adjacent fat  There may be some involvement of the adjacent descending duodenum  Findings may be related to the history of Crohn's disease/ulcerative colitis  Occult tumor not excluded at this time  Follow-up to resolution recommended  2   Subtle increased ground glass and nodular densities right lung apex may be inflammatory/infectious  CT follow-up in 3-6 months may be considered to ensure resolution  Additional tiny nodular densities bilaterally appear similar  3   Nonobstructing renal calculi  Workstation performed: [id number]     Xr Chest Portable    Result Date: 5/19/2018  Impression: Interval increased interstitial and hazy airspace densities bilaterally  Workstation performed: [id number]     Xr Chest Portable    Result Date: 5/19/2018  Impression: No acute cardiopulmonary disease  Workstation performed: [id number]     Xr Chest Portable    Result Date: 5/19/2018  Impression: No change from 5/17/2018 Workstation performed: [id number]     Xr Chest Portable    Result Date: 5/18/2018  Impression: No acute cardiopulmonary disease   Workstation performed: [id number]     Xr Chest Portable    Result Date: 5/16/2018  Impression: Very small left effusion with bibasilar subsegmental atelectasis Workstation performed: [id number]     Xr Chest Portable    Result Date: 5/15/2018  Impression: No acute cardiopulmonary disease  Findings are stable  Findings are consistent with emergency provider's preliminary reading Workstation performed: [id number]     Xr Chest Portable Icu    Result Date: 5/20/2018  Impression: Improved aeration left lung  Persistent right infrahilar opacity is likely atelectasis  Workstation performed: [id number]     EKG:   Micro:  Lab Results   Component Value Date    BLOODCX No Growth After 5 Days  05/15/2018    BLOODCX No Growth After 5 Days  05/15/2018     Allergies:    Allergies   Allergen Reactions    Other      Brown cloth band aids        Medications:   Scheduled Meds:    Current Facility-Administered Medications:  acetaminophen 650 mg Oral Q6H PRN Roel Finney MD    albuterol 2 puff Inhalation Q4H PRN Army Cruz, DO    amiodarone 200 mg Oral BID With Meals Maverick Jain MD    aspirin 81 mg Oral Daily Yajaira Barrett, DO    atorvastatin 40 mg Oral Daily With Bartolo Childress MD    budesonide-formoterol 2 puff Inhalation BID Sander Thomas MD    diazepam 5 mg Oral Q8H June Dialey MD    folic acid 540 mcg Oral Daily Yajaira Barrett, DO    heparin (porcine) 5,000 Units Subcutaneous Q8H [address] Aura Colon PA-C    HYDROmorphone 0 5 mg Intravenous Q4H PRN Roel Finney MD    insulin lispro 1-6 Units Subcutaneous TID AC Roel Finney MD    insulin lispro 1-6 Units Subcutaneous HS Roel Finney MD    ipratropium 0 5 mg Nebulization Q6H Danyell Saavedra, DO    levalbuterol 1 25 mg Nebulization Q6H Danyell Saavedra, DO    LORazepam 0 5 mg Intravenous Q2H PRN Yajaira Barrett, DO    Or        LORazepam 1 mg Intravenous Q2H PRN Yajaira Barrett, DO    Or        LORazepam 2 mg Intravenous Q2H PRN Yajaira Barrett, DO    metoprolol 5 mg Intravenous Q6H PRN Louise Resendiz PA-C    metoprolol succinate 50 mg Oral Daily June Dailey MD    nicotine 14 mg Transdermal Daily Kelsi Orozco AMALIA Purcell    oxyCODONE 5 mg Oral Q4H PRN Kayode Rodriguez MD    pantoprazole 40 mg Oral Early Morning Mookie Parsons DO    piperacillin-tazobactam 3 375 g Intravenous Q6H Artur Danielle MD Last Rate: 3 375 g (05/26/18 0544)   predniSONE 50 mg Oral Daily Kayode Rodriguez MD    thiamine 100 mg Oral Daily Mookie Parsons DO      Continuous Infusions:   PRN Meds:    acetaminophen 650 mg Q6H PRN   albuterol 2 puff Q4H PRN   HYDROmorphone 0 5 mg Q4H PRN   LORazepam 0 5 mg Q2H PRN   Or     LORazepam 1 mg Q2H PRN   Or     LORazepam 2 mg Q2H PRN   metoprolol 5 mg Q6H PRN   oxyCODONE 5 mg Q4H PRN       VTE Pharmacologic Prophylaxis: Heparin  VTE Mechanical Prophylaxis: sequential compression device    Kayode Rodriguez MD

## 2018-05-26 NOTE — NURSING NOTE
Per respiratory pt found without his oxygen on  Spo2 was 78% on RA and quickly recovered to 90s once placed on 6 L NC  Approximately an hour later pt was rounded on by RN and observed to have his oxygen off again  Pt had been observed several times by primary RN earlier in the night removing his oxygen and was again instructed to leave his oxygen on to which he responded "it just falls off so I take it off "  Informed pt that it is very important if it falls of and he needs assistance to call for assistance in putting it back on  Will continue to monitor

## 2018-05-26 NOTE — PROGRESS NOTES
Progress Note - Colorectal Surgery   Verito Martinez 56 y o  male MRN: [id number]  Unit/Bed#: [facility] 631-01 Encounter: [id number]    Assessment:  55 yo M with perforated transverse colon 2/2 bowel ischemia s/p ex lap, ileocolectomy with primary anastomosis, diverting ileostomy, fascial closure, SANTA x2 drain placement 5/22    PO Intake 600 ml  Ostomy 910    Plan:  Continue surg soft diet/dysphagia as recommended by speech therapy  Monitor SANTA drainage, midline wound  Continue Zosyn  F/U AM WBC count  Reorientation, fall prevention  PT/OT      Subjective/Objective   Chief Complaint: NOne    Subjective: Had a fall overnight- was confused and got OOB, trying to pick "candy" up from off the floor  Slightly confused this AM  States he didn't feel like eating much yesterday, but denies N/V  VAC removed from midline yesterday, closed & dressing placed  Objective:     Blood pressure 139/87, pulse (!) 109, temperature 98 4 °F (36 9 °C), temperature source Oral, resp  rate 22, height 5' 6" (1 676 m), weight 85 8 kg (189 lb 2 5 oz), SpO2 91 %  ,Body mass index is 30 53 kg/m²  Intake/Output Summary (Last 24 hours) at 05/26/18 6892  Last data filed at 05/26/18 0558   Gross per 24 hour   Intake              738 ml   Output             2431 ml   Net            -1693 ml       Invasive Devices     Peripheral Intravenous Line            Peripheral IV 05/24/18 Right;Upper Arm 1 day          Drain            Closed/Suction Drain Left LLQ Bulb 10 Fr  3 days    Closed/Suction Drain Left LLQ Bulb 10 Fr  3 days    Colostomy  RUQ 3 days              Physical Exam:   Gen: A&O, NAD  Cardio: RRR  Lungs: CTAB  Abd: Soft, non distended, non tender  Steri strips to midline incision, with small amount serosanginous drainage  Ostomy w/ green output   SANTA drains serosanginous      Lab, Imaging and other studies:CBC: No results found for: WBC, HGB, HCT, MCV, PLT, ADJUSTEDWBC, MCH, MCHC, RDW, MPV, NRBC, CMP: No results found for: NA, K, CL, CO2, ANIONGAP, BUN, CREATININE, GLUCOSE, CALCIUM, AST, ALT, ALKPHOS, PROT, ALBUMIN, BILITOT, EGFR  VTE Pharmacologic Prophylaxis: Heparin  VTE Mechanical Prophylaxis: sequential compression device

## 2018-05-26 NOTE — NURSING NOTE
Called by staff RN to room for unwitnessed fall  Pt found on floor in sitting position next to the far side of the bed by A  Tommie Mcadams RN at [facility] alarm on bed properly engaged at time of fall, but was not alarming in room or gleason  Pt alert to person and time  Disoriented to place  Questionable hypoxia at the time as oxygen was not on when pt was found (sp02 88% on 4 L NC when checked after pt was back in bed)  Pt easily reorientable, and stated he "was trying to put the candy on the floor" when he fell out of bed  Pt did not know if he hit his head or lost consciousness during fall  Pt only complaint was preexisting surgical abdominal pain slightly worse than prior to fall  Midline incision assessed to be approximated with slight amount of new bloody drainage  No other new physical findings noted at this time attributed to fall  Pt assisted back into bed by staff  Decision was then made by primary and charge RNs to move pt from current room (612) to 631 to be closer to nurses station  Pt and belongings moved to [phone] without incident  Dr Arlen Rajan made aware and at bedside  No new orders at this time  Will continue to monitor

## 2018-05-26 NOTE — PROGRESS NOTES
Pt c/o unrelieved pain and pressure with 5 mg Oxycodone  Pt given IV Dilaudid   5 mg  Incision site checked  Serosang, fluid  actively draining from incision site under steri-strips  Paged SOD for Ruggerri  They suggested I page colon/rectal sx

## 2018-05-26 NOTE — PROGRESS NOTES
Pt found to have stool looking fluids discharging from the area of his JPs  Pts colostomy is still intact  Pts BP 90/50s and tachy in 110s-120s  Pt c/o of stabbing, sudden pain in his abdomen  SODA notified of pt condition  Lia Najera would come see patient and to also page for colorectal sx

## 2018-05-26 NOTE — PLAN OF CARE
GASTROINTESTINAL - ADULT     Maintains adequate nutritional intake Not Progressing          DISCHARGE PLANNING - CARE MANAGEMENT     Discharge to post-acute care or home with appropriate resources Progressing        GASTROINTESTINAL - ADULT     Maintains or returns to baseline bowel function Progressing     Establish and maintain optimal ostomy function Progressing        Nutrition/Hydration-ADULT     Nutrient/Hydration intake appropriate for improving, restoring or maintaining nutritional needs Progressing        Potential for Falls     Patient will remain free of falls Progressing        Prexisting or High Potential for Compromised Skin Integrity     Skin integrity is maintained or improved Progressing        RESPIRATORY - ADULT     Achieves optimal ventilation and oxygenation Progressing        SKIN/TISSUE INTEGRITY - ADULT     Skin integrity remains intact Progressing

## 2018-05-26 NOTE — PROGRESS NOTES
Concerns from nursing and medicine for stool draining from SANTA sites  Abdominal binder removed, and found to have leaking from medial edge of ostomy bag, draining towards drain sponges  Dressings appear saturated, but no drainage in or around SANTA drains  Stool-soaked steristrips removed, and new ones placed around umbilicus     --> wash abdomen daily  --> keep midline wound clean  --> frequent dressing changes around drain    Harinder JASSO   [facility]  [id number]

## 2018-05-26 NOTE — PROGRESS NOTES
Progress Note - Pulmonary   Laurent Osgood 56 y o  male MRN: [id number]  Unit/Bed#: [facility] 631-01 Encounter: [id number]    Assessment:  Acute hypoxemic respiratory failure-improving  COPD with exacerbation  Coronary artery disease  Systolic heart failure with EF of 25 percent secondary to idiopathic cardiomyopathy  Alcohol abuse disorder  Acute kidney insufficiency-resolved  Bowel perforation steady post ileostomy    Plan:  Patient currently on 6 liters/minute nasal cannula  Saturating 97 percent decrease to 4 liters/minutes O2 nurse about decreasing further  Goal SpO2 between 88-92 percent  Continue with current management of Zosyn IV as well as bronchodilators per protocol  Now on prednisone 50 milligrams  Continue incentive spirometry and flutter valve  He continues to have good urine output  Repeat electrolytes p r n  PT and OT as tolerated    Subjective:   Patient seen and examined at bedside  States he is feeling better  Less short of breath today  He still has cough but able to mobilize secretions  Objective:     Vitals: Blood pressure 122/56, pulse (!) 120, temperature 98 9 °F (37 2 °C), temperature source Oral, resp  rate 22, height 5' 6" (1 676 m), weight 85 8 kg (189 lb 2 5 oz), SpO2 94 %  ,Body mass index is 30 53 kg/m²        Intake/Output Summary (Last 24 hours) at 05/26/18 1648  Last data filed at 05/26/18 1458   Gross per 24 hour   Intake              408 ml   Output             3051 ml   Net            -2643 ml       Invasive Devices     Peripheral Intravenous Line            Peripheral IV 05/24/18 Right;Upper Arm 2 days          Drain            Closed/Suction Drain Left LLQ Bulb 10 Fr  4 days    Closed/Suction Drain Left LLQ Bulb 10 Fr  4 days    Colostomy  RUQ 4 days                Physical Exam: General appearance: alert and oriented, in no acute distress, appears older than stated age and cooperative  Lungs: diminished breath sounds  Chest wall: no tenderness  Heart: S1, S2 normal, tachycardic  Abdomen: abnormal findings:  Ileostomy in place  Draining brown stool SANTA drains in place  Extremities: extremities normal, warm and well-perfused; no cyanosis, clubbing, or edema     Labs: I have personally reviewed pertinent lab results  , ABG:   Lab Results   Component Value Date    PHART 7 443 05/26/2018    MDX5IZT 46 1 (H) 05/26/2018    PO2ART 72 4 (L) 05/26/2018    HGH8VWY 30 8 (H) 05/26/2018    BEART 5 9 05/26/2018    SOURCE Radial, Left 05/26/2018   , BNP: No results found for: BNP, CBC:   Lab Results   Component Value Date    WBC 17 22 (H) 05/26/2018    HGB 12 6 05/26/2018    HCT 39 1 05/26/2018    MCV 99 (H) 05/26/2018     05/26/2018    MCH 32 0 05/26/2018    MCHC 32 2 05/26/2018    RDW 16 9 (H) 05/26/2018    MPV 10 3 05/26/2018    NRBC 0 05/26/2018   , CMP:   Lab Results   Component Value Date     05/26/2018    K 4 4 05/26/2018     05/26/2018    CO2 33 (H) 05/26/2018    ANIONGAP 5 05/26/2018    BUN 15 05/26/2018    CREATININE 0 71 05/26/2018    GLUCOSE 63 (L) 05/26/2018    CALCIUM 7 9 (L) 05/26/2018    EGFR 106 05/26/2018     Imaging and other studies: I have personally reviewed pertinent reports     and I have personally reviewed pertinent films in PACS

## 2018-05-27 LAB
GLUCOSE SERPL-MCNC: 117 MG/DL (ref 65–140)
GLUCOSE SERPL-MCNC: 178 MG/DL (ref 65–140)
GLUCOSE SERPL-MCNC: 192 MG/DL (ref 65–140)
GLUCOSE SERPL-MCNC: 258 MG/DL (ref 65–140)

## 2018-05-27 PROCEDURE — 94640 AIRWAY INHALATION TREATMENT: CPT

## 2018-05-27 PROCEDURE — 82948 REAGENT STRIP/BLOOD GLUCOSE: CPT

## 2018-05-27 PROCEDURE — 94762 N-INVAS EAR/PLS OXIMTRY CONT: CPT

## 2018-05-27 PROCEDURE — 99232 SBSQ HOSP IP/OBS MODERATE 35: CPT | Performed by: INTERNAL MEDICINE

## 2018-05-27 PROCEDURE — 94760 N-INVAS EAR/PLS OXIMETRY 1: CPT

## 2018-05-27 PROCEDURE — 94668 MNPJ CHEST WALL SBSQ: CPT

## 2018-05-27 RX ADMIN — HYDROMORPHONE HYDROCHLORIDE 0.5 MG: 1 INJECTION, SOLUTION INTRAMUSCULAR; INTRAVENOUS; SUBCUTANEOUS at 19:55

## 2018-05-27 RX ADMIN — NICOTINE 14 MG: 14 PATCH, EXTENDED RELEASE TRANSDERMAL at 09:22

## 2018-05-27 RX ADMIN — HYDROMORPHONE HYDROCHLORIDE 0.5 MG: 1 INJECTION, SOLUTION INTRAMUSCULAR; INTRAVENOUS; SUBCUTANEOUS at 09:21

## 2018-05-27 RX ADMIN — HYDROMORPHONE HYDROCHLORIDE 0.5 MG: 1 INJECTION, SOLUTION INTRAMUSCULAR; INTRAVENOUS; SUBCUTANEOUS at 15:18

## 2018-05-27 RX ADMIN — IPRATROPIUM BROMIDE 0.5 MG: 0.5 SOLUTION RESPIRATORY (INHALATION) at 00:16

## 2018-05-27 RX ADMIN — Medication 3.38 G: at 12:34

## 2018-05-27 RX ADMIN — DIAZEPAM 5 MG: 5 TABLET ORAL at 05:30

## 2018-05-27 RX ADMIN — LEVALBUTEROL HYDROCHLORIDE 1.25 MG: 1.25 SOLUTION, CONCENTRATE RESPIRATORY (INHALATION) at 19:17

## 2018-05-27 RX ADMIN — HEPARIN SODIUM 5000 UNITS: 5000 INJECTION, SOLUTION INTRAVENOUS; SUBCUTANEOUS at 05:30

## 2018-05-27 RX ADMIN — LEVALBUTEROL HYDROCHLORIDE 1.25 MG: 1.25 SOLUTION, CONCENTRATE RESPIRATORY (INHALATION) at 08:06

## 2018-05-27 RX ADMIN — Medication 400 MCG: at 09:22

## 2018-05-27 RX ADMIN — INSULIN LISPRO 3 UNITS: 100 INJECTION, SOLUTION INTRAVENOUS; SUBCUTANEOUS at 22:41

## 2018-05-27 RX ADMIN — LEVALBUTEROL HYDROCHLORIDE 1.25 MG: 1.25 SOLUTION, CONCENTRATE RESPIRATORY (INHALATION) at 00:16

## 2018-05-27 RX ADMIN — BUDESONIDE AND FORMOTEROL FUMARATE DIHYDRATE 2 PUFF: 160; 4.5 AEROSOL RESPIRATORY (INHALATION) at 19:55

## 2018-05-27 RX ADMIN — ASPIRIN 81 MG 81 MG: 81 TABLET ORAL at 09:21

## 2018-05-27 RX ADMIN — HEPARIN SODIUM 5000 UNITS: 5000 INJECTION, SOLUTION INTRAVENOUS; SUBCUTANEOUS at 22:41

## 2018-05-27 RX ADMIN — AMIODARONE HYDROCHLORIDE 200 MG: 200 TABLET ORAL at 09:21

## 2018-05-27 RX ADMIN — HEPARIN SODIUM 5000 UNITS: 5000 INJECTION, SOLUTION INTRAVENOUS; SUBCUTANEOUS at 15:19

## 2018-05-27 RX ADMIN — BUDESONIDE AND FORMOTEROL FUMARATE DIHYDRATE 2 PUFF: 160; 4.5 AEROSOL RESPIRATORY (INHALATION) at 09:21

## 2018-05-27 RX ADMIN — INSULIN LISPRO 2 UNITS: 100 INJECTION, SOLUTION INTRAVENOUS; SUBCUTANEOUS at 12:34

## 2018-05-27 RX ADMIN — METOPROLOL SUCCINATE 50 MG: 50 TABLET, EXTENDED RELEASE ORAL at 09:22

## 2018-05-27 RX ADMIN — IPRATROPIUM BROMIDE 0.5 MG: 0.5 SOLUTION RESPIRATORY (INHALATION) at 13:38

## 2018-05-27 RX ADMIN — NYSTATIN: 100000 POWDER TOPICAL at 09:26

## 2018-05-27 RX ADMIN — IPRATROPIUM BROMIDE 0.5 MG: 0.5 SOLUTION RESPIRATORY (INHALATION) at 19:17

## 2018-05-27 RX ADMIN — IPRATROPIUM BROMIDE 0.5 MG: 0.5 SOLUTION RESPIRATORY (INHALATION) at 08:06

## 2018-05-27 RX ADMIN — Medication 3.38 G: at 05:30

## 2018-05-27 RX ADMIN — ATORVASTATIN CALCIUM 40 MG: 40 TABLET, FILM COATED ORAL at 18:07

## 2018-05-27 RX ADMIN — DIAZEPAM 5 MG: 5 TABLET ORAL at 18:07

## 2018-05-27 RX ADMIN — PANTOPRAZOLE SODIUM 40 MG: 40 TABLET, DELAYED RELEASE ORAL at 05:30

## 2018-05-27 RX ADMIN — LEVALBUTEROL HYDROCHLORIDE 1.25 MG: 1.25 SOLUTION, CONCENTRATE RESPIRATORY (INHALATION) at 13:38

## 2018-05-27 RX ADMIN — Medication 100 MG: at 09:21

## 2018-05-27 RX ADMIN — INSULIN LISPRO 1 UNITS: 100 INJECTION, SOLUTION INTRAVENOUS; SUBCUTANEOUS at 18:08

## 2018-05-27 RX ADMIN — AMIODARONE HYDROCHLORIDE 200 MG: 200 TABLET ORAL at 18:07

## 2018-05-27 RX ADMIN — PREDNISONE 50 MG: 20 TABLET ORAL at 09:21

## 2018-05-27 RX ADMIN — ALBUTEROL SULFATE 2 PUFF: 90 AEROSOL, METERED RESPIRATORY (INHALATION) at 05:32

## 2018-05-27 NOTE — PROGRESS NOTES
Progress Note - Colorectal Surgery   Kelsey Whitten 56 y o  male MRN: [id number]  Unit/Bed#: [facility] 631-01 Encounter: [id number]    Assessment:  55 yo M with perforated transverse colon 2/2 bowel ischemia s/p ex lap, ileocolectomy with primary anastomosis, diverting ileostomy, fascial closure, SANTA x2 drain placement 5/22    Plan:  - continue diet as tolerated  - likely d/c one SANTA today  - continue antibiotics  - prednisone per medicine  - OOB, ambulate  - DVT ppx    Subjective/Objective     Subjective: Patient reports poor appetite, does not like food in hospital  Denies complaints this AM       Objective:     Vitals: Blood pressure 110/50, pulse 77, temperature 98 3 °F (36 8 °C), temperature source Oral, resp  rate 18, height 5' 6" (1 676 m), weight 83 5 kg (184 lb 1 4 oz), SpO2 95 %  ,Body mass index is 29 71 kg/m²  I/O       05/25 0701 - 05/26 0700 05/26 0701 - 05/27 0700 05/27 0701 - 05/28 0700    P  O  598 320     I V  (mL/kg) 40 (0 5)      IV Piggyback 100      Total Intake(mL/kg) 738 (8 6) 320 (3 8)     Urine (mL/kg/hr) 1175 (0 6) 1890 (0 9)     Drains 346 (0 2) 265 (0 1)     Stool 910 (0 4) 1075 (0 5)     Total Output 2431 3230      Net -1693 -2910             Unmeasured Urine Occurrence 1 x 2 x           Physical Exam:  GEN: NAD  HEENT: MMM  CV: RRR  Lung: Normal effort  Ab: Soft, NT/ND, ostomy pink with bowel function  Extrem: No CCE  Neuro:  A+Ox3    Lab, Imaging and other studies:   CBC with diff:   Lab Results   Component Value Date    WBC 17 22 (H) 05/26/2018    HGB 12 6 05/26/2018    HCT 39 1 05/26/2018    MCV 99 (H) 05/26/2018     05/26/2018    MCH 32 0 05/26/2018    MCHC 32 2 05/26/2018    RDW 16 9 (H) 05/26/2018    MPV 10 3 05/26/2018    NRBC 0 05/26/2018   , BMP/CMP: No results found for: NA, K, CL, CO2, ANIONGAP, BUN, CREATININE, GLUCOSE, CALCIUM, AST, ALT, ALKPHOS, PROT, ALBUMIN, BILITOT, EGFR, Magnesium: No results found for: MAG  VTE Pharmacologic Prophylaxis: Heparin  VTE Mechanical Prophylaxis: sequential compression device

## 2018-05-27 NOTE — PROGRESS NOTES
IM Residency Progress Note   Unit/Bed#: [facility] 631-01 Encounter: [id number]  SOD Team A      Irma Rua 56 y o  male [id number]    Hospital Stay Days: 12      Assessment/Plan:    Principal Problem:    COPD with exacerbation [facility])  Active Problems:    Colitis    Coronary artery disease involving native coronary artery of native heart    Leukocytosis    Dyslipidemia    Acute kidney failure (HCC)    Alcohol abuse    Tobacco abuse    Alcohol dependence with withdrawal (HCC)    Acute respiratory insufficiency    Agitation    Heart failure, systolic, due to idiopathic cardiomyopathy (HCC)    Presence of ileostomy ([facility])    Moderate protein-calorie malnutrition (HCC)    Anemia    Hypocalcemia    Perforation of colon (HCC)    Acute Hypoxic Respiratory Failure  -Secondary to COPD exacerbation  S/p extubation on 5/23  -CXR on 5/23 shows persistent left basilar effusion, with possible atelectasis or infection  Also pulmonary vascular congestion   -Continue respiratory protocol, xoponex and atrovent treatments, symbicort   -Continue prednisone 50mg po daily  Currently on day 12 of steroids  -ABG from 5/26 shows mild metabolic alkalosis  -CXR on 5/26 shows consolidation likely due to atelectasis  -s/p one time dose of lasix with good diuresis  - will continue to monitor and treat for COPD exacerbation, continue oxygen supplementation     Perforated Transverse Colon  -Also with history of Crons/Ulcerative colitis with previous right hemicolectomy  -S/p ex-lap on 5/22 with ileocolectomy, diverting ielostomy, fascial closure and 2x SANTA drains placed  -S/p Zosyn for 5 day course  -Colorectal surgery on board  Okay for dysphagia level 2 diet w/ supplements   -yesterday patient had leakage from ostomy bag with stool soaked into incisional bandage  Washed by surgery team  - will monitor vitals closely for any fever or decompensation or signs of infection     Afib with RVR   -s/p cardioversion on 5/18   Has been in NSR since that time   -Continue amiodarone 200mg po bid   -Rate control with metoprolol 25mg po bid   -As per cardiology, patient will not need eliquis unless he develops PAF      New Onset Systolic CHF  -Echo on 0/43/33 shows EF 25% with severe diffuse hypokinesis, consistent with G2DD  Previous echo in 12/2016 showed EF 55% with diffuse hypokinesis  -Continue lopressor 25mg po bid  - s/p one dose IV lasix on 5/26 with minimal improvement in respiratory status  -Monitor I/Os, daily weights      NSTEMI type 2  -History of CAD w/ stenting in RCA in 2012   -Continue aspirin 81mg po daily, lipitor 40mg po daily     Hypocalcemia  - repleted yesterday  - recheck calcium tomorrow     Alcohol abuse  -Complicated by withdrawal in the ICU, was weaned off precedex  Continue ativan prn as per CIWA protocol   -Continue thiamine 100mg po daily and folate 400mcg po daily  -Weaned valium 5mg po from tid to bid on 5/26  - will plan to decreased valium further tomorrow If patient does well     Hyperglycemia  -A1c on 5/24/18 was 6 3   -Continue sliding scale insulin and BS checks      HTN  -BPs have been stable      Shock, poa  -Resolved  -Secondary to sepsis vs  Hypovolemia, requiring resuscitation with levophed and massive fluids      DEBBY, poa  -Resolved  -Likely secondary to volume depletion      Thrombocytopenia  -Resolved  -Received 2 units platelets in the OR  Disposition: Continue inpatient care      Subjective/24 hour events:   Patient reports his breathing is slightly improved from yesterday  He notes that his respiratory status really depends on when he had his last breathing treatment  He denies any fevers or chills  Yesterday patient was noted to have leakage from ostomy site with stool soaked bandage is of his incisional wound  Surgery was contacted may wash the patient and replace bandages with clean supplies  Patient finished his five day  course of Zosyn yesterday    Patient also had episode of hypotension with pressure of 90/40  This improved with no intervention and was was thought to be likely secondary to 1 time dose of Lasix  Vitals: Temp (24hrs), Av 8 °F (37 1 °C), Min:98 3 °F (36 8 °C), Max:99 6 °F (37 6 °C)  Current: Temperature: 99 °F (37 2 °C)  Vitals:    18 0017 18 0600 18 0736 18 0806   BP:   112/72    BP Location:   Right arm    Pulse:   85 87   Resp:   18    Temp:   99 °F (37 2 °C)    TempSrc:   Oral    SpO2: 95%  92% (!) 88%   Weight:  83 5 kg (184 lb 1 4 oz)     Height:        Body mass index is 29 71 kg/m²  I/O last 24 hours: In: 65 [P O :320]  Out: 0 [Urine:2140; Drains:265; Stool:1075]      Physical Exam   Constitutional: He appears well-developed and well-nourished  No distress  HENT:   Head: Normocephalic and atraumatic  Eyes: Right eye exhibits no discharge  Left eye exhibits no discharge  Neck: No tracheal deviation present  Cardiovascular: Normal rate and regular rhythm  Pulmonary/Chest: Effort normal  No stridor  He has no rales  Coarse breath sounds   Abdominal: Soft  He exhibits no distension  Ostomy bag in place with no leakage  SANTA drains x2 with serosanguinous output  Abdominal incision with clean bandage in place   Musculoskeletal: He exhibits no deformity  Neurological: He is alert  Skin: Skin is warm and dry  Psychiatric: He has a normal mood and affect  His behavior is normal    Nursing note and vitals reviewed        Invasive Devices     Peripheral Intravenous Line            Peripheral IV 18 Right;Upper Arm 2 days          Drain            Closed/Suction Drain Left LLQ Bulb 10 Fr  4 days    Closed/Suction Drain Left LLQ Bulb 10 Fr  4 days    Colostomy  RUQ 4 days                          Labs:   Recent Results (from the past 24 hour(s))   Blood gas, arterial    Collection Time: 18  9:06 AM   Result Value Ref Range    pH, Arterial 7 443 7 350 - 7 450    pCO2, Arterial 46 1 (H) 36 0 - 44 0 mm Hg    pO2, Arterial 72 4 (L) 75 0 - 129 0 mm Hg    HCO3, Arterial 30 8 (H) 22 0 - 28 0 mmol/L    Base Excess, Arterial 5 9 mmol/L    O2 Content, Arterial 16 0 16 0 - 23 0 mL/dL    O2 HGB,Arterial  93 2 (L) 94 0 - 97 0 %    SOURCE Radial, Left     ONDINA TEST Yes     Nasal Cannula 6    CBC and differential    Collection Time: 05/26/18 10:09 AM   Result Value Ref Range    WBC  4 31 - 10 16 Thousand/uL    RBC  3 88 - 5 62 Million/uL    Hemoglobin  12 0 - 17 0 g/dL    Hematocrit  36 5 - 49 3 %    MCV  82 - 98 fL    MCH  26 8 - 34 3 pg    MCHC  31 4 - 37 4 g/dL    RDW  11 6 - 15 1 %    MPV  8 9 - 12 7 fL    Platelets  407 - 830 Thousands/uL   Calcium, ionized    Collection Time: 05/26/18 10:09 AM   Result Value Ref Range    Calcium, Ionized 0 93 (L) 1 12 - 1 32 mmol/L   CBC and differential    Collection Time: 05/26/18 12:01 PM   Result Value Ref Range    WBC 17 22 (H) 4 31 - 10 16 Thousand/uL    RBC 3 94 3 88 - 5 62 Million/uL    Hemoglobin 12 6 12 0 - 17 0 g/dL    Hematocrit 39 1 36 5 - 49 3 %    MCV 99 (H) 82 - 98 fL    MCH 32 0 26 8 - 34 3 pg    MCHC 32 2 31 4 - 37 4 g/dL    RDW 16 9 (H) 11 6 - 15 1 %    MPV 10 3 8 9 - 12 7 fL    Platelets 671 893 - 120 Thousands/uL    nRBC 0 /100 WBCs   Fingerstick Glucose (POCT)    Collection Time: 05/26/18 12:01 PM   Result Value Ref Range    POC Glucose 78 65 - 140 mg/dl   Manual Differential(PHLEBS Do Not Order)    Collection Time: 05/26/18 12:01 PM   Result Value Ref Range    Segmented % 87 (H) 43 - 75 %    Lymphocytes % 3 (L) 14 - 44 %    Monocytes % 10 4 - 12 %    Eosinophils % 0 0 - 6 %    Basophils % 0 0 - 1 %    Absolute Neutrophils 14 98 (H) 1 85 - 7 62 Thousand/uL    Lymphocytes Absolute 0 52 (L) 0 60 - 4 47 Thousand/uL    Monocytes Absolute 1 72 (H) 0 00 - 1 22 Thousand/uL    Eosinophils Absolute 0 00 0 00 - 0 40 Thousand/uL    Basophils Absolute 0 00 0 00 - 0 10 Thousand/uL    Total Counted      Platelet Estimate Adequate Adequate   Fingerstick Glucose (POCT)    Collection Time: 05/26/18 6:16 PM   Result Value Ref Range    POC Glucose 139 65 - 140 mg/dl   Fingerstick Glucose (POCT)    Collection Time: 05/26/18  9:24 PM   Result Value Ref Range    POC Glucose 150 (H) 65 - 140 mg/dl       Radiology Results: I have personally reviewed pertinent reports  Other Diagnostic Testing:   I have personally reviewed pertinent reports          Active Meds:   Current Facility-Administered Medications   Medication Dose Route Frequency    acetaminophen (TYLENOL) tablet 650 mg  650 mg Oral Q6H PRN    albuterol (PROVENTIL HFA,VENTOLIN HFA) inhaler 2 puff  2 puff Inhalation Q4H PRN    amiodarone tablet 200 mg  200 mg Oral BID With Meals    aspirin chewable tablet 81 mg  81 mg Oral Daily    atorvastatin (LIPITOR) tablet 40 mg  40 mg Oral Daily With Dinner    budesonide-formoterol (SYMBICORT) 160-4 5 mcg/act inhaler 2 puff  2 puff Inhalation BID    diazepam (VALIUM) tablet 5 mg  5 mg Oral W70X    folic acid (FOLVITE) tablet 400 mcg  400 mcg Oral Daily    heparin (porcine) subcutaneous injection 5,000 Units  5,000 Units Subcutaneous Q8H [address]    HYDROmorphone (DILAUDID) injection 0 5 mg  0 5 mg Intravenous Q4H PRN    insulin lispro (HumaLOG) 100 units/mL subcutaneous injection 1-6 Units  1-6 Units Subcutaneous TID AC    insulin lispro (HumaLOG) 100 units/mL subcutaneous injection 1-6 Units  1-6 Units Subcutaneous HS    ipratropium (ATROVENT) 0 02 % inhalation solution 0 5 mg  0 5 mg Nebulization Q6H    levalbuterol (XOPENEX) inhalation solution 1 25 mg  1 25 mg Nebulization Q6H    LORazepam (ATIVAN) 2 mg/mL injection 0 5 mg  0 5 mg Intravenous Q2H PRN    Or    LORazepam (ATIVAN) 2 mg/mL injection 1 mg  1 mg Intravenous Q2H PRN    Or    LORazepam (ATIVAN) 2 mg/mL injection 2 mg  2 mg Intravenous Q2H PRN    metoprolol (LOPRESSOR) injection 5 mg  5 mg Intravenous Q6H PRN    metoprolol succinate (TOPROL-XL) 24 hr tablet 50 mg  50 mg Oral Daily    nicotine (NICODERM CQ) 14 mg/24hr TD 24 hr patch 14 mg 14 mg Transdermal Daily    nystatin (MYCOSTATIN) powder   Topical BID    oxyCODONE (ROXICODONE) IR tablet 5 mg  5 mg Oral Q4H PRN    pantoprazole (PROTONIX) EC tablet 40 mg  40 mg Oral Early Morning    piperacillin-tazobactam (ZOSYN) 3 375 g in sodium chloride 0 9 % 50 mL IVPB  3 375 g Intravenous Q6H    predniSONE tablet 50 mg  50 mg Oral Daily    thiamine (VITAMIN B1) tablet 100 mg  100 mg Oral Daily         VTE Pharmacologic Prophylaxis: Heparin  VTE Mechanical Prophylaxis: sequential compression device    [place], DO

## 2018-05-27 NOTE — PROGRESS NOTES
Cardiology Progress Note - Mansi Kwan 56 y o  male MRN: [id number]    Unit/Bed#: [facility] 631-01 Encounter: [id number]      Assessment:  Principal Problem:    COPD with exacerbation ([facility] 75 )  Active Problems:    Colitis    Coronary artery disease involving native coronary artery of native heart    Leukocytosis    Dyslipidemia    Acute kidney failure (HCC)    Alcohol abuse    Tobacco abuse    Alcohol dependence with withdrawal (HCC)    Acute respiratory insufficiency    Agitation    Heart failure, systolic, due to idiopathic cardiomyopathy (HCC)    Presence of ileostomy ([facility])    Moderate protein-calorie malnutrition (HCC)    Anemia    Hypocalcemia    Perforation of colon ([facility] 75 )      Plan:  Patient is comfortable  He has no chest pain or significant dyspnea  He is on supplemental oxygen  He remains in sinus rhythm on telemetry  He is postop day five after bowel surgery  He continues on amiodarone and metoprolol  I have made no change in his medical regimen  Subjective:   Patient seen and examined  No significant events overnight   negative  Objective:     Vitals: Blood pressure 112/72, pulse 85, temperature 99 °F (37 2 °C), temperature source Oral, resp  rate 18, height 5' 6" (1 676 m), weight 83 5 kg (184 lb 1 4 oz), SpO2 92 %  , Body mass index is 29 71 kg/m² , Orthostatic Blood Pressures      Most Recent Value   Blood Pressure  112/72 filed at 05/27/2018 0736   Patient Position - Orthostatic VS  Lying filed at 05/27/2018 0736      ,      Intake/Output Summary (Last 24 hours) at 05/27/18 0744  Last data filed at 05/27/18 0741   Gross per 24 hour   Intake              320 ml   Output             3280 ml   Net            -2960 ml       No significant arrhythmias seen on telemetry review         Physical Exam:    GEN: Mansi Kwan  NECK: supple, no carotid bruits, no JVD or HJR  HEART: normal rate, regular rhythm, normal S1 and S2, no murmurs, clicks, gallops or rubs   LUNGS: clear to auscultation bilaterally; no wheezes, rales, or rhonchi     EXTREMITIES: peripheral pulses normal; no clubbing, cyanosis, or edema  SKIN: warm and well perfused, no suspicious lesions on exposed skin    Labs & Results:    Admission on 05/15/2018   No results displayed because visit has over 200 results  Ct Chest Abdomen Pelvis Wo Contrast    Result Date: 5/15/2018  Narrative: CT CHEST, ABDOMEN AND PELVIS WITHOUT IV CONTRAST INDICATION:   sob  Malaise, weakness, incontinence COMPARISON: 12/7/2016 TECHNIQUE: CT examination of the chest, abdomen and pelvis was performed without intravenous contrast   Axial, sagittal, and coronal 2D reformatted images were created from the source data and submitted for interpretation  Radiation dose length product (DLP) for this visit:  727 44 mGy-cm   This examination, like all CT scans performed in the [facility], was performed utilizing techniques to minimize radiation dose exposure, including the use of iterative  reconstruction and automated exposure control  Enteric contrast was not administered  FINDINGS: CHEST LUNGS:  Subtle increased ground glass and nodular densities right lung apex may be inflammatory/infectious  3 mm right lower lung nodular density series 3 image 39, is unchanged  Additional scattered tiny nodular densities bilaterally appear similar  There is no tracheal or endobronchial lesion  PLEURA:  Unremarkable  HEART/GREAT VESSELS:  Unremarkable for patient's age  Coronary artery calcifications  MEDIASTINUM AND ADRIANNA:  Unremarkable  CHEST WALL AND LOWER NECK:   Unremarkable  ABDOMEN LIVER/BILIARY TREE:  Hepatic steatosis  GALLBLADDER:  Tiny layering calcified gallstone  No pericholecystic inflammatory change  SPLEEN:  Unremarkable  PANCREAS:  Unremarkable  ADRENAL GLANDS:  Unremarkable  KIDNEYS/URETERS:  Nonobstructing renal calculi bilaterally measuring up to 6 mm  No hydronephrosis    Stable probable proteinaceous cyst left upper kidney measuring 2 x 1 6 cm  STOMACH AND BOWEL:  Although exam is limited without intravenous contrast, there appears to be wall thickening centered around the sutures in the hepatic flexure of the colon  There is associated inflammatory change and infiltration of the adjacent fat  There may be some involvement of the adjacent descending duodenum  Findings may be related to the history of Crohn's disease/ulcerative colitis  Occult tumor not excluded at this time  No bowel obstruction  APPENDIX:  No findings to suggest appendicitis  ABDOMINOPELVIC CAVITY:  No ascites or free intraperitoneal air  No lymphadenopathy  VESSELS:  Unremarkable for patient's age  PELVIS REPRODUCTIVE ORGANS:  Unremarkable for patient's age  Prostate calcifications  URINARY BLADDER:  Unremarkable  ABDOMINAL WALL/INGUINAL REGIONS:  Unremarkable  OSSEOUS STRUCTURES:  No acute fracture or destructive osseous lesion  Impression: 1  Although exam is limited without intravenous contrast, there appears to be wall thickening centered around the sutures in the hepatic flexure of the colon, with associated inflammatory change and infiltration of the adjacent fat  There may be some involvement of the adjacent descending duodenum  Findings may be related to the history of Crohn's disease/ulcerative colitis  Occult tumor not excluded at this time  Follow-up to resolution recommended  2   Subtle increased ground glass and nodular densities right lung apex may be inflammatory/infectious  CT follow-up in 3-6 months may be considered to ensure resolution  Additional tiny nodular densities bilaterally appear similar  3   Nonobstructing renal calculi  Workstation performed: [id number]     Xr Chest Portable    Result Date: 5/26/2018  Narrative: CHEST INDICATION:   Wheezing  COMPARISON:  5/23/2018 EXAM PERFORMED/VIEWS:  XR CHEST PORTABLE FINDINGS:  The endotracheal tube and endogastric tube has been removed  There is leftward mediastinal shift   There is consolidation of a portion of the left lung in the suprahilar area and also at the base  Given the volume loss, this is probably significant atelectasis although interval development of pneumonia is not entirely excluded and continued follow-up  is suggested  The right lung appears clear  No gross evidence of pleural fluid or pneumothorax  There is slight narrowing of the tracheal lumen in its midportion  Etiology uncertain  There is a scoliotic curvature of the spine with reverse S configuration  There is a tubular device over the left side of abdomen, perhaps an enteric tube or other type of artifactual density  Impression: Considerable volume loss in left hemithorax with consolidation of portion of left lung which could be atelectasis (favored) or pneumonia as an alternate consideration  Recommend continued follow-up and appropriate clinical correlation  Patient extubated  Workstation performed: [id number]     Xr Chest Portable    Result Date: 5/23/2018  Narrative: CHEST INDICATION:   Intubated  COMPARISON:  Chest 5/22/2018 EXAM PERFORMED/VIEWS:  XR CHEST PORTABLE FINDINGS:  Endotracheal tube is present, in satisfactory position with its tip above the level of the amy  Enteric tube is present with its tip extending below the left hemidiaphragm  Cardiomediastinal silhouette appears unremarkable  There is a persistent left basilar opacity and blunting of left costophrenic angle  No additional focal consolidation or pneumothorax  Persistent pulmonary vascular congestion  Osseous structures appear within normal limits for patient age  Impression: 1  Persistent left basilar opacity compatible with effusion and a component of atelectasis or infection  2   Pulmonary vascular congestion  Workstation performed: [id number]     Xr Chest Portable    Result Date: 5/22/2018  Narrative: CHEST INDICATION:   Status post intubation   COMPARISON:  5/22/2018 EXAM PERFORMED/VIEWS:  XR CHEST PORTABLE FINDINGS: Endotracheal tube is present, in satisfactory position with its tip above the level of the amy  Enteric tube is present with its tip extending below the left hemidiaphragm  Cardiomediastinal silhouette appears unremarkable  There is increased left basilar opacity compatible with small pleural effusion and probable left basilar consolidation on the basis of atelectasis or pneumonia  There is diffusely increased interstitial prominence system with fluid overload  No pneumothorax  Osseous structures appear within normal limits for patient age  Impression: Endotracheal tube in satisfactory position  Vascular congestion  Small left pleural effusion with probable left basilar consolidation due to atelectasis or pneumonia  Workstation performed: [id number]     Xr Chest Portable    Result Date: 5/19/2018  Narrative: CHEST INDICATION:   post intubation  COMPARISON:  5/19/2018 EXAM PERFORMED/VIEWS:  XR CHEST PORTABLE FINDINGS:  ET tube tip is approximately 2 5 cm above the amy  NG tube tip below the diaphragm  Cardiomediastinal silhouette appears unremarkable  Interval increased interstitial and hazy airspace densities bilaterally  Osseous structures appear within normal limits for patient age  Impression: Interval increased interstitial and hazy airspace densities bilaterally  Workstation performed: [id number]     Xr Chest Portable    Result Date: 5/19/2018  Narrative: CHEST INDICATION:   severe respiratory distress r/o Pneumothorax  COMPARISON:  5/19/2018 EXAM PERFORMED/VIEWS:  XR CHEST PORTABLE FINDINGS: Heart shadow is enlarged but unchanged from prior exam  The lungs are clear  No pneumothorax or pleural effusion  Osseous structures appear within normal limits for patient age  Impression: No acute cardiopulmonary disease  Workstation performed: [id number]     Xr Chest Portable    Result Date: 5/19/2018  Narrative: CHEST INDICATION:   hypoxia   COMPARISON:  5/17/2018 EXAM PERFORMED/VIEWS:  XR CHEST PORTABLE FINDINGS: The cardiac silhouette is without change from the prior study  No acute pulmonary disease, no pneumothorax or pleural effusion  Osseous structures appear within normal limits for patient age  Impression: No change from 5/17/2018 Workstation performed: [id number]     Xr Chest Portable    Result Date: 5/18/2018  Narrative: CHEST INDICATION:   hypoxia  COMPARISON:  5/16/2018 EXAM PERFORMED/VIEWS:  XR CHEST PORTABLE FINDINGS: Cardiomediastinal silhouette appears unremarkable  The lungs are clear  No pneumothorax or pleural effusion  Osseous structures appear within normal limits for patient age  Impression: No acute cardiopulmonary disease  Workstation performed: [id number]     Xr Chest Portable    Result Date: 5/16/2018  Narrative: CHEST INDICATION:   Shortness of breath  COMPARISON:  5/15/2018 EXAM PERFORMED/VIEWS:  XR CHEST PORTABLE FINDINGS: The cardiac silhouette is without change from the prior study  A very small left effusion is suspected  Bibasilar atelectasis noted  No pneumothorax or pulmonary edema  Osseous structures appear within normal limits for patient age  Impression: Very small left effusion with bibasilar subsegmental atelectasis Workstation performed: [id number]     Xr Chest Portable    Result Date: 5/15/2018  Narrative: CHEST INDICATION: 61-year-old male, shortness of breath COMPARISON: 12/7/2016 chest x-ray EXAM PERFORMED/VIEWS:  XR CHEST PORTABLE Single image FINDINGS: Cardiomediastinal silhouette appears unremarkable  The lungs are clear  No pneumothorax or pleural effusion  Osseous structures appear within normal limits for patient age  Impression: No acute cardiopulmonary disease  Findings are stable   Findings are consistent with emergency provider's preliminary reading Workstation performed: [id number]     Xr Abdomen 1 Vw Portable    Result Date: 5/22/2018  Narrative: ABDOMEN INDICATION: Instrument miscount in OR  COMPARISON:  10/23/2016 VIEWS: AP supine FINDINGS: There are surgical drain projecting over the left lateral abdomen and right lower quadrant  No metallic surgical instruments identified  Partially imaged enterogastric feeding tube in the left upper quadrant  There is a nonspecific bowel gas pattern  Visualized osseous structures are unremarkable for the patient's age  Impression: No metallic surgical instrument identified  Workstation performed: [id number]     Xr Chest Portable Icu    Result Date: 5/22/2018  Narrative: CHEST INDICATION:   f/u resp failure  COMPARISON:  Chest x-ray from 5/21/2018  EXAM PERFORMED/VIEWS:  XR CHEST PORTABLE ICU FINDINGS:  Endotracheal tube and nasogastric tube have been removed  Cardiomediastinal silhouette appears unremarkable  The lungs are clear  No pneumothorax or pleural effusion  Osseous structures appear within normal limits for patient age  There is free intraperitoneal air  In retrospect, this was present on the 5/21/2018 chest x-ray, but was obscured by overlying EKG leads  Impression: There is free intraperitoneal air  I personally discussed this study with Reta Thompson on 5/22/2018 at 8:51 AM  Workstation performed: [id number]     Xr Chest Portable Icu    Result Date: 5/21/2018  Narrative: CHEST INDICATION:   RESP FAILURE  COMPARISON:  5/20/2018 EXAM PERFORMED/VIEWS:  XR CHEST PORTABLE ICU FINDINGS: Cardiomegaly noted as before  Endotracheal and nasogastric tubes both in satisfactory position  Mild central congestion  No consolidation or pneumothorax  Small left basilar pleural effusion  Osseous structures appear within normal limits for patient age  Impression: Stable chest  Workstation performed: [id number]     Xr Chest Portable Icu    Result Date: 5/20/2018  Narrative: CHEST INDICATION:   Respiratory failure  COMPARISON:  May 19, 2018 EXAM PERFORMED/VIEWS:  XR CHEST PORTABLE ICU FINDINGS:  Endotracheal tube and nasogastric tube in place   Cardiomediastinal silhouette appears unremarkable  Patchy right infrahilar airspace opacity likely atelectasis  Stable from prior  Improved aeration in the left lung  Osseous structures appear within normal limits for patient age  Impression: Improved aeration left lung  Persistent right infrahilar opacity is likely atelectasis  Workstation performed: [id number]       EKG personally reviewed by Sigrid Muhammad MD      Counseling / Coordination of Care  Total floor / unit time spent today 30 minutes  Greater than 50% of total time was spent with the patient and / or family counseling and / or coordination of care

## 2018-05-28 LAB
ANION GAP SERPL CALCULATED.3IONS-SCNC: 4 MMOL/L (ref 4–13)
BUN SERPL-MCNC: 14 MG/DL (ref 5–25)
CA-I BLD-SCNC: 1.09 MMOL/L (ref 1.12–1.32)
CALCIUM SERPL-MCNC: 8.2 MG/DL (ref 8.3–10.1)
CHLORIDE SERPL-SCNC: 103 MMOL/L (ref 100–108)
CO2 SERPL-SCNC: 33 MMOL/L (ref 21–32)
CREAT SERPL-MCNC: 0.69 MG/DL (ref 0.6–1.3)
ERYTHROCYTE [DISTWIDTH] IN BLOOD BY AUTOMATED COUNT: 16.2 % (ref 11.6–15.1)
GFR SERPL CREATININE-BSD FRML MDRD: 107 ML/MIN/1.73SQ M
GLUCOSE SERPL-MCNC: 132 MG/DL (ref 65–140)
GLUCOSE SERPL-MCNC: 138 MG/DL (ref 65–140)
GLUCOSE SERPL-MCNC: 201 MG/DL (ref 65–140)
GLUCOSE SERPL-MCNC: 211 MG/DL (ref 65–140)
GLUCOSE SERPL-MCNC: 232 MG/DL (ref 65–140)
HCT VFR BLD AUTO: 31.5 % (ref 36.5–49.3)
HGB BLD-MCNC: 9.9 G/DL (ref 12–17)
MCH RBC QN AUTO: 31.6 PG (ref 26.8–34.3)
MCHC RBC AUTO-ENTMCNC: 31.4 G/DL (ref 31.4–37.4)
MCV RBC AUTO: 101 FL (ref 82–98)
PLATELET # BLD AUTO: 210 THOUSANDS/UL (ref 149–390)
PMV BLD AUTO: 10.3 FL (ref 8.9–12.7)
POTASSIUM SERPL-SCNC: 4.4 MMOL/L (ref 3.5–5.3)
RBC # BLD AUTO: 3.13 MILLION/UL (ref 3.88–5.62)
SODIUM SERPL-SCNC: 140 MMOL/L (ref 136–145)
WBC # BLD AUTO: 14.69 THOUSAND/UL (ref 4.31–10.16)

## 2018-05-28 PROCEDURE — 94760 N-INVAS EAR/PLS OXIMETRY 1: CPT

## 2018-05-28 PROCEDURE — 94668 MNPJ CHEST WALL SBSQ: CPT | Performed by: SOCIAL WORKER

## 2018-05-28 PROCEDURE — 82330 ASSAY OF CALCIUM: CPT | Performed by: INTERNAL MEDICINE

## 2018-05-28 PROCEDURE — 94668 MNPJ CHEST WALL SBSQ: CPT

## 2018-05-28 PROCEDURE — 99233 SBSQ HOSP IP/OBS HIGH 50: CPT | Performed by: INTERNAL MEDICINE

## 2018-05-28 PROCEDURE — 85027 COMPLETE CBC AUTOMATED: CPT | Performed by: INTERNAL MEDICINE

## 2018-05-28 PROCEDURE — 99232 SBSQ HOSP IP/OBS MODERATE 35: CPT | Performed by: INTERNAL MEDICINE

## 2018-05-28 PROCEDURE — 82948 REAGENT STRIP/BLOOD GLUCOSE: CPT

## 2018-05-28 PROCEDURE — 94640 AIRWAY INHALATION TREATMENT: CPT

## 2018-05-28 PROCEDURE — 80048 BASIC METABOLIC PNL TOTAL CA: CPT | Performed by: INTERNAL MEDICINE

## 2018-05-28 PROCEDURE — 94760 N-INVAS EAR/PLS OXIMETRY 1: CPT | Performed by: SOCIAL WORKER

## 2018-05-28 PROCEDURE — 94640 AIRWAY INHALATION TREATMENT: CPT | Performed by: SOCIAL WORKER

## 2018-05-28 RX ORDER — PREDNISONE 20 MG/1
20 TABLET ORAL DAILY
Status: DISCONTINUED | OUTPATIENT
Start: 2018-06-03 | End: 2018-05-29

## 2018-05-28 RX ORDER — PREDNISONE 20 MG/1
40 TABLET ORAL DAILY
Status: DISCONTINUED | OUTPATIENT
Start: 2018-05-30 | End: 2018-05-29

## 2018-05-28 RX ORDER — DIAZEPAM 5 MG/1
5 TABLET ORAL DAILY
Status: DISCONTINUED | OUTPATIENT
Start: 2018-05-29 | End: 2018-05-29

## 2018-05-28 RX ORDER — PREDNISONE 10 MG/1
10 TABLET ORAL DAILY
Status: DISCONTINUED | OUTPATIENT
Start: 2018-06-05 | End: 2018-05-29

## 2018-05-28 RX ADMIN — PREDNISONE 50 MG: 20 TABLET ORAL at 08:41

## 2018-05-28 RX ADMIN — ASPIRIN 81 MG 81 MG: 81 TABLET ORAL at 08:42

## 2018-05-28 RX ADMIN — IPRATROPIUM BROMIDE 0.5 MG: 0.5 SOLUTION RESPIRATORY (INHALATION) at 07:43

## 2018-05-28 RX ADMIN — BUDESONIDE AND FORMOTEROL FUMARATE DIHYDRATE 2 PUFF: 160; 4.5 AEROSOL RESPIRATORY (INHALATION) at 08:38

## 2018-05-28 RX ADMIN — IPRATROPIUM BROMIDE 0.5 MG: 0.5 SOLUTION RESPIRATORY (INHALATION) at 13:40

## 2018-05-28 RX ADMIN — HEPARIN SODIUM 5000 UNITS: 5000 INJECTION, SOLUTION INTRAVENOUS; SUBCUTANEOUS at 22:43

## 2018-05-28 RX ADMIN — NYSTATIN: 100000 POWDER TOPICAL at 09:21

## 2018-05-28 RX ADMIN — IPRATROPIUM BROMIDE 0.5 MG: 0.5 SOLUTION RESPIRATORY (INHALATION) at 19:34

## 2018-05-28 RX ADMIN — HYDROMORPHONE HYDROCHLORIDE 0.5 MG: 1 INJECTION, SOLUTION INTRAMUSCULAR; INTRAVENOUS; SUBCUTANEOUS at 10:52

## 2018-05-28 RX ADMIN — INSULIN LISPRO 3 UNITS: 100 INJECTION, SOLUTION INTRAVENOUS; SUBCUTANEOUS at 17:15

## 2018-05-28 RX ADMIN — HYDROMORPHONE HYDROCHLORIDE 0.5 MG: 1 INJECTION, SOLUTION INTRAMUSCULAR; INTRAVENOUS; SUBCUTANEOUS at 16:30

## 2018-05-28 RX ADMIN — Medication 400 MCG: at 08:38

## 2018-05-28 RX ADMIN — IPRATROPIUM BROMIDE 0.5 MG: 0.5 SOLUTION RESPIRATORY (INHALATION) at 00:22

## 2018-05-28 RX ADMIN — DIAZEPAM 5 MG: 5 TABLET ORAL at 06:13

## 2018-05-28 RX ADMIN — NYSTATIN: 100000 POWDER TOPICAL at 17:14

## 2018-05-28 RX ADMIN — Medication 100 MG: at 08:42

## 2018-05-28 RX ADMIN — LEVALBUTEROL HYDROCHLORIDE 1.25 MG: 1.25 SOLUTION, CONCENTRATE RESPIRATORY (INHALATION) at 00:22

## 2018-05-28 RX ADMIN — LEVALBUTEROL HYDROCHLORIDE 1.25 MG: 1.25 SOLUTION, CONCENTRATE RESPIRATORY (INHALATION) at 13:40

## 2018-05-28 RX ADMIN — CALCIUM GLUCONATE 2 G: 98 INJECTION, SOLUTION INTRAVENOUS at 09:45

## 2018-05-28 RX ADMIN — HEPARIN SODIUM 5000 UNITS: 5000 INJECTION, SOLUTION INTRAVENOUS; SUBCUTANEOUS at 13:14

## 2018-05-28 RX ADMIN — HYDROMORPHONE HYDROCHLORIDE 0.5 MG: 1 INJECTION, SOLUTION INTRAMUSCULAR; INTRAVENOUS; SUBCUTANEOUS at 22:41

## 2018-05-28 RX ADMIN — AMIODARONE HYDROCHLORIDE 200 MG: 200 TABLET ORAL at 17:12

## 2018-05-28 RX ADMIN — BUDESONIDE AND FORMOTEROL FUMARATE DIHYDRATE 2 PUFF: 160; 4.5 AEROSOL RESPIRATORY (INHALATION) at 22:43

## 2018-05-28 RX ADMIN — NICOTINE 14 MG: 14 PATCH, EXTENDED RELEASE TRANSDERMAL at 08:43

## 2018-05-28 RX ADMIN — PANTOPRAZOLE SODIUM 40 MG: 40 TABLET, DELAYED RELEASE ORAL at 06:13

## 2018-05-28 RX ADMIN — INSULIN LISPRO 2 UNITS: 100 INJECTION, SOLUTION INTRAVENOUS; SUBCUTANEOUS at 22:45

## 2018-05-28 RX ADMIN — LEVALBUTEROL HYDROCHLORIDE 1.25 MG: 1.25 SOLUTION, CONCENTRATE RESPIRATORY (INHALATION) at 19:34

## 2018-05-28 RX ADMIN — INSULIN LISPRO 2 UNITS: 100 INJECTION, SOLUTION INTRAVENOUS; SUBCUTANEOUS at 12:20

## 2018-05-28 RX ADMIN — ATORVASTATIN CALCIUM 40 MG: 40 TABLET, FILM COATED ORAL at 17:12

## 2018-05-28 RX ADMIN — AMIODARONE HYDROCHLORIDE 200 MG: 200 TABLET ORAL at 08:42

## 2018-05-28 RX ADMIN — HEPARIN SODIUM 5000 UNITS: 5000 INJECTION, SOLUTION INTRAVENOUS; SUBCUTANEOUS at 06:13

## 2018-05-28 RX ADMIN — LEVALBUTEROL HYDROCHLORIDE 1.25 MG: 1.25 SOLUTION, CONCENTRATE RESPIRATORY (INHALATION) at 07:43

## 2018-05-28 RX ADMIN — Medication 30000 UNITS: at 12:20

## 2018-05-28 NOTE — PROGRESS NOTES
Patient refused to go back into bed, said he would like to sleep in the chair overnight     Kayla Ortiz RN

## 2018-05-28 NOTE — PROGRESS NOTES
IM Residency Progress Note   [facility] Encounter: [id number]  SOD Team A      Cesar Ill 56 y o  male [id number]    Hospital Stay Days: 13    Assessment/Plan:    Principal Problem:    COPD with exacerbation [facility])  Active Problems:    Colitis    Coronary artery disease involving native coronary artery of native heart    Leukocytosis    Dyslipidemia    Acute kidney failure (HCC)    Alcohol abuse    Tobacco abuse    Alcohol dependence with withdrawal (HCC)    Acute respiratory insufficiency    Agitation    Heart failure, systolic, due to idiopathic cardiomyopathy (HCC)    Presence of ileostomy (HCC)    Moderate protein-calorie malnutrition (HCC)    Anemia    Hypocalcemia    Perforation of colon (HCC)    Acute Hypoxic Respiratory Failure  -Secondary to COPD exacerbation  S/p extubation on 5/23  -CXR on 5/26 shows consolidation likely due to atelectasis and less likely pneumonia   -Continue respiratory protocol, xoponex and atrovent treatments, symbicort   -Continue prednisone 50mg po daily  Currently on day 13 of steroids  Will begin tapering prednisone starting 5/30  Will do 40 x 2 days, 30 x 2 days, 20 x 2 days, 10 x 2 days until 6/6   -ABG from 5/26 shows mild metabolic alkalosis  -s/p one time dose of lasix with good diuresis  -Continue to monitor and treat for COPD exacerbation, continue oxygen supplementation     Perforated Transverse Colon  -Also with history of Crons/Ulcerative colitis with previous right hemicolectomy  -S/p ex-lap on 5/22 with ileocolectomy, diverting ielostomy, fascial closure and 2x SANTA drains placed  1 SANTA has been removed  -S/p Zosyn for 5 day course  -Colorectal surgery on board  Cleared for GI diet   -On 5/26 patient had leakage from ostomy bag with stool soaked into incisional bandage  Washed by surgery team   -Will monitor vitals closely for any fever or decompensation or signs of infection     Afib with RVR   -s/p cardioversion on 5/18   Has been in NSR since that time   -Continue amiodarone 200mg po bid until 6/6, then switch to 200mg po daily    -Rate control with metoprolol 25mg po bid   -As per cardiology, patient will not need eliquis unless he develops PAF  -F/u with Dr Aicha Ortiz as an outpatient for an echo       New Onset Systolic CHF  -Echo on 9/22/89 shows EF 25% with severe diffuse hypokinesis, consistent with G2DD  Previous echo in 12/2016 showed EF 55% with diffuse hypokinesis  -Continue lopressor 25mg po bid    -s/p one dose IV lasix on 5/26 with minimal improvement in respiratory status  -Monitor I/Os, daily weights      NSTEMI type 2  -History of CAD w/ stenting in RCA in 2012   -Continue aspirin 81mg po daily, lipitor 40mg po daily     Hypocalcemia  -Ionized calcium improved to 1 09   -Will give one more dose of calcium gluconate      Alcohol abuse  -Complicated by withdrawal in the ICU, was weaned off precedex     -Continue thiamine 100mg po daily and folate 400mcg po daily  -Currently on valium 5mg po daily  Continue to wean      Hyperglycemia  -A1c on 5/24/18 was 6 3   -Continue sliding scale insulin and BS checks      HTN  -BPs have been stable      Shock, poa  -Resolved  -Secondary to sepsis vs  Hypovolemia, requiring resuscitation with levophed and massive fluids      DEBBY, poa  -Resolved  -Likely secondary to volume depletion      Thrombocytopenia  -Resolved  -Received 2 units platelets in the OR      DEBBY, poa  -Resolved  -Likely secondary to volume depletion      Thrombocytopenia  -Resolved  -Received 2 units platelets in the OR  Disposition: continue inpatient management for copd exacerbation, perforated colon    Subjective: This morning patient feels well  Has some mild abdominal discomfort  He is annoyed about having a restricted diet, but it appears that his diet was recently upgraded from dysphagia to GI non-ulcerogenic  His breathing has improved  Hospital Course:   As per NP Jarad and Dr Delvalle Prophet a 56 y o  male who presents on 05/15 from home with complaint of malaise and weakness as well as incontinence of stool/urine due to inability to get off his couch   He has a past medical history significant for COPD, hypertension, coronary artery disease status post PCI to RCA in 2012, ulcerative colitis/Crohn's s/p right hemicolectomy, alcohol abuse, and tobacco abuse   Per the patient he has had several episodes of emesis daily for at least the past month  Layla Mendez denies any increasing diarrhea or nausea, reports shortness of breath is at baseline, denies any fevers, chest pain, any difficulties voiding and reports urinating 3 to 4 times a day, he does endorse decreased appetite eating approximately 2 meals a day, he reports smoking cigars for 3-4 hours daily, drinking at least 1 bottle of kenji a day   In regards to his alcohol abuse he states that he has not stopped drinking for any significant amount of time and is unclear regarding withdrawal symptoms or seizures   In the emergency room he was significantly short of breath and received an extended nebulizer treatment with good effect   He was profoundly hypotensive and was resuscitated with 3 L of crystalloid, and ultimately started on norepinephrine   He had an acute kidney injury, significant metabolic derangements, an elevated troponin, and a lactic acidosis      Patient was admitted to the ICU, and resuscitated with fluids and levophed  His lactic acidosis improved and he was weaned off pressors  His lactic acidosis resolved and the patient was successesfully taken off pressors, maintaining a MAP >65  DEBBY resolved with IV fluid hydration  Antibiotics were continued initially with vancomycin cefepime and flagyl, which were discontinued after the procalcitonin resulted less than 0 5 and blood cultures were negative   The patient also presented with acute encephalopathy secondary to acute alcohol withdrawal and DTs, was started on scheduled Serax with CIWA protocol, encephalopathy resolved and he is currently on scheduled Valium  Thiamine and folate were started  Ammonia was elevated, and lactulose was given for a period of time during ICU stay       This admission, he developed new onset atrial fibrillation with RVR s/p synchronized cardioversion once, s/p Cardizem and digoxin load, and was started on an amiodarone drip  On the day of transfer he has been rate controlled and in sinus rhythm on amiodarone drip  An ECHO was performed which showed a new drop in LVEF to 25% with diffuse hypokinesis with regional variations, new compared to 2016  He had a mild troponin elevation likely secondary to demand ischemia  EKG showed old septal Q waves and ST depressions in inferior leads  With history of inferior MI s/p RCA stent in 2012, aspirin, atorvastatin and metoprolol were continued  He was started on Eliquis for new onset Afib, which was held prior to ex lap  On 5/19 he was intubated due to acute hypoxic respiratory failure secondary to COPD exacerbation with severe bronchospasm  He was extubated on 5/21  He was receiving Xopenex and Atrovent, Pulmicort, and Solumedrol 40 mg every 8 hours, which was decreased to every 12 hours yesterday and daily today  Pulmicort was discontinued today and Symbicort was started  On 5/22 free air was identified under the diaphragm on chest XR in the setting of history of Crohn's disease- colorectal surgery was consulted and he was taken to the OR for an exploratory laparotomy  A perforation was identified in the transverse colon with ischemic bowel, and he had an ileocolectomy, ileocolic anastomosis, and loop ileostomy, and Zosyn every 6 hours was started  He was re-intubated in the OR and extubated on 5/23  He improved clinically and was medically cleared for transfer to [facility] on 5/24  On the evening of 5/25, patient was found down on the floor at beside   Patient denied hitting head or LOC, states that he was trying to find candy off the floor  Later that evening, he was consistently found to be desaturating secondary to pulling off his nasal cannula  A continuous pulse ox was applied at that time  On  he was noted to have increased abdominal pain and leaking around the ostomy site; this was subsequently cared for by the surgical team      Vitals: Temp (24hrs), Av 8 °F (37 1 °C), Min:98 4 °F (36 9 °C), Max:99 °F (37 2 °C)  Current: Temperature: 98 4 °F (36 9 °C)  Vitals:    18 2245 18 0023 18 0700 18 0745   BP: 113/64      BP Location: Right arm      Pulse: 96      Resp: 22      Temp: 98 4 °F (36 9 °C)      TempSrc: Oral      SpO2: 95% 94%  93%   Weight:   84 1 kg (185 lb 6 5 oz)    Height:        Body mass index is 29 93 kg/m²  Physical Exam:   General: lying in bed in no acute distress  HEENT: nc/at, eomi  Cardiovascular: reg rate and rhythm  Pulmonary: minimal wheezes in upper lung fields  Abdominal: surgical dressing in place  Ileostomy draining dark brown liquid  1x SANTA draining minimal serosanguinous fluid  Extremities: no pitting edema  Skin: bruising over the abdomen  Neuro: aox2 but not exact date  Intake and Outputs:  I/O last 24 hours: In: 80 [P O :580]  Out: 3410 [Urine:2195; Drains:65; AVRNP:4797]  Nutrition:        Diet Orders            Start     Ordered    18 1441  Diet GI; Non Ulcerogenic; Sodium 2 Gm, Lo Cholesterol, Lo Fat  Diet effective now     Question Answer Comment   Diet Type GI    GI Non Ulcerogenic    Other Restriction(s): Sodium 2 Gm    Other Restriction(s): Lo Cholesterol    Other Restriction(s): Lo Fat    RD to adjust diet per protocol? Yes        18 1443    18 1533  Dietary nutrition supplements  Once     Question Answer Comment   Select Supplement: Sophie Terre[place]ne - PRO/90 KCALS    Frequency Breakfast, Lunch, Dinner        18 1532        Invasive lines and devices:   Invasive Devices     Peripheral Intravenous Line            Peripheral IV 18 Right; Upper Arm 3 days          Drain            Closed/Suction Drain Left LLQ Bulb 10 Fr  5 days    Closed/Suction Drain Left LLQ Bulb 10 Fr  5 days    Colostomy  RUQ 5 days                 Labs:     Results from last 7 days  Lab Units 05/28/18  0616 05/26/18  1201 05/24/18 0451 05/23/18 0445   WBC Thousand/uL 14 69* 17 22* 13 39* 11 28*   HEMOGLOBIN g/dL 9 9* 12 6 9 0* 10 4*   HEMATOCRIT % 31 5* 39 1 27 9* 32 3*   PLATELETS Thousands/uL 210 216 165 151   NEUTROS PCT %  --   --  87*  --    MONOS PCT %  --   --  6  --    MONO PCT MAN %  --  10  --  0*       Results from last 7 days  Lab Units 05/28/18  0616 05/26/18  0602 05/24/18  0451 05/23/18 0445 05/22/18  0447   SODIUM mmol/L 140 140 142 142  < > 140   POTASSIUM mmol/L 4 4 4 4 4 6 5 1  < > 4 6   CHLORIDE mmol/L 103 102 104 105  < > 103   CO2 mmol/L 33* 33* 34* 34*  < > 32   BUN mg/dL 14 15 18 20  < > 20   CREATININE mg/dL 0 69 0 71 0 62 0 69  < > 0 74   CALCIUM mg/dL 8 2* 7 9* 7 7* 7 4*  < > 7 6*   TOTAL PROTEIN g/dL  --   --   --  4 7*  --  5 7*   BILIRUBIN TOTAL mg/dL  --   --   --  0 97  --  0 74   ALK PHOS U/L  --   --   --  61  --  115   ALT U/L  --   --   --  50  --  51   AST U/L  --   --   --  40  --  47*   GLUCOSE RANDOM mg/dL 138 63* 126 175*  < > 112   GLUCOSE, ISTAT   --   --   --   --   < >  --    < > = values in this interval not displayed      Results from last 7 days  Lab Units 05/26/18  0602 05/23/18 0445 05/22/18  1432   MAGNESIUM mg/dL 2 4 2 3 1 8     Lab Results   Component Value Date    PHOS 2 9 05/24/2018    PHOS 2 7 05/23/2018    PHOS 2 7 05/22/2018        Results from last 7 days  Lab Units 05/23/18 0445 05/22/18  1432 05/22/18  0447   INR  1 28* 1 27* 1 14   PTT seconds 29 31  --        0  Lab Value Date/Time   TROPONINI 0 12 (H) 05/20/2018 1753   TROPONINI 0 17 (H) 05/20/2018 1505   TROPONINI 0 17 (H) 05/20/2018 1214   TROPONINI 0 29 (H) 05/19/2018 0438   TROPONINI 0 30 (H) 05/19/2018 0154   TROPONINI 0 32 (H) 05/18/2018 2229 TROPONINI 0 32 (H) 05/18/2018 1909   TROPONINI 1 15 (H) 05/16/2018 1416   TROPONINI 1 24 (H) 05/16/2018 0522   TROPONINI 1 16 (H) 05/16/2018 0209   TROPONINI 1 09 (H) 05/16/2018 0053   TROPONINI 0 85 (H) 05/15/2018 1935   TROPONINI 0 72 (H) 05/15/2018 1728   TROPONINI 0 47 (H) 05/15/2018 1304   TROPONINI 0 02 12/07/2016 1931   TROPONINI 0 04 (H) 12/07/2016 0907   TROPONINI <0 04 05/22/2015 0123       Results from last 7 days  Lab Units 05/22/18  1432 05/22/18  0824   LACTIC ACID mmol/L 1 4 0 8     ABG:  Lab Results   Component Value Date    PHART 7 443 05/26/2018    PIU3DUS 46 1 (H) 05/26/2018    PO2ART 72 4 (L) 05/26/2018    GQC3OWX 30 8 (H) 05/26/2018    BEART 5 9 05/26/2018    SOURCE Radial, Left 05/26/2018     Imaging: I have personally reviewed pertinent reports  Ct Chest Abdomen Pelvis Wo Contrast    Result Date: 5/15/2018  Impression: 1  Although exam is limited without intravenous contrast, there appears to be wall thickening centered around the sutures in the hepatic flexure of the colon, with associated inflammatory change and infiltration of the adjacent fat  There may be some involvement of the adjacent descending duodenum  Findings may be related to the history of Crohn's disease/ulcerative colitis  Occult tumor not excluded at this time  Follow-up to resolution recommended  2   Subtle increased ground glass and nodular densities right lung apex may be inflammatory/infectious  CT follow-up in 3-6 months may be considered to ensure resolution  Additional tiny nodular densities bilaterally appear similar  3   Nonobstructing renal calculi  Workstation performed: [id number]     Xr Chest Portable    Result Date: 5/19/2018  Impression: Interval increased interstitial and hazy airspace densities bilaterally  Workstation performed: [id number]     Xr Chest Portable    Result Date: 5/19/2018  Impression: No acute cardiopulmonary disease   Workstation performed: [id number]     Xr Chest Portable    Result Date: 5/19/2018  Impression: No change from 5/17/2018 Workstation performed: [id number]     Xr Chest Portable    Result Date: 5/18/2018  Impression: No acute cardiopulmonary disease  Workstation performed: [id number]     Xr Chest Portable    Result Date: 5/16/2018  Impression: Very small left effusion with bibasilar subsegmental atelectasis Workstation performed: [id number]     Xr Chest Portable    Result Date: 5/15/2018  Impression: No acute cardiopulmonary disease  Findings are stable  Findings are consistent with emergency provider's preliminary reading Workstation performed: [id number]     Xr Chest Portable Icu    Result Date: 5/20/2018  Impression: Improved aeration left lung  Persistent right infrahilar opacity is likely atelectasis  Workstation performed: [id number]     EKG:   Micro:  Lab Results   Component Value Date    BLOODCX No Growth After 5 Days  05/15/2018    BLOODCX No Growth After 5 Days  05/15/2018     Allergies:    Allergies   Allergen Reactions    Other      Brown cloth band aids        Medications:   Scheduled Meds:    Current Facility-Administered Medications:  acetaminophen 650 mg Oral Q6H PRN Yelitza Meng MD   albuterol 2 puff Inhalation Q4H PRN Alize Lowe DO   amiodarone 200 mg Oral BID With Meals Kamini Nicole MD   aspirin 81 mg Oral Daily Ivan Griffith DO   atorvastatin 40 mg Oral Daily With Marta Cochran MD   budesonide-formoterol 2 puff Inhalation BID Aggie Grace MD   [START ON 5/29/2018] diazepam 5 mg Oral Daily Frankie Jones MD   folic acid 266 mcg Oral Daily Ivan Griffith DO   heparin (porcine) 5,000 Units Subcutaneous Q8H [address] Aura Colon PA-C   HYDROmorphone 0 5 mg Intravenous Q4H PRN Yelitza Mneg MD   insulin lispro 1-6 Units Subcutaneous TID AC Yelitza Meng MD   insulin lispro 1-6 Units Subcutaneous HS Yelitza Meng MD   ipratropium 0 5 mg Nebulization Q6H Danyell Saavedra DO   levalbuterol 1 25 mg Nebulization Q6H Danyell Saavedra DO   LORazepam 0 5 mg Intravenous Q2H PRN Yajaira Barrett,    Or       LORazepam 1 mg Intravenous Q2H PRN Yajaira Barrett, DO   Or       LORazepam 2 mg Intravenous Q2H PRN Yajaira Barrett, DO   metoprolol 5 mg Intravenous Q6H PRN Louise Resendiz PA-C   metoprolol succinate 50 mg Oral Daily June Dailey MD   nicotine 14 mg Transdermal Daily AMALIA Martin   nystatin  Topical BID June Dailey MD   oxyCODONE 5 mg Oral Q4H PRN Roel Finney MD   pantoprazole 40 mg Oral Early Morning Yajaira Barrett DO   predniSONE 50 mg Oral Daily Roel Finney MD   thiamine 100 mg Oral Daily Yajaira Barrett DO     Continuous Infusions:   PRN Meds:    acetaminophen 650 mg Q6H PRN   albuterol 2 puff Q4H PRN   HYDROmorphone 0 5 mg Q4H PRN   LORazepam 0 5 mg Q2H PRN   Or     LORazepam 1 mg Q2H PRN   Or     LORazepam 2 mg Q2H PRN   metoprolol 5 mg Q6H PRN   oxyCODONE 5 mg Q4H PRN       VTE Pharmacologic Prophylaxis: Heparin  VTE Mechanical Prophylaxis: sequential compression device    Roel Finney MD

## 2018-05-28 NOTE — PROGRESS NOTES
Progress Note - Colorectal Surgery   Rena Diaz 56 y o  male MRN: [id number]  Unit/Bed#: [facility] 631-01 Encounter: [id number]    Assessment:  55 yo M with perforated transverse colon 2/2 bowel ischemia s/p ex lap, ileocolectomy with primary anastomosis, diverting ileostomy, fascial closure, SANTA x2 drain placement 5/22    - SANTA removed, remaining SANTA 30  - +Ostomy function    Plan:  F/U AM WBC- was elevated 5/26  Continue diet as tolerated  OOB, ambulation  IV antibiotics per primary  Amiodarone/metoprolol per cardiology  SQH, SCDs        Subjective/Objective   Chief Complaint: None    Subjective: No complaints  Lower SANTA and ostomy bar removed yesterday w/o incident  Tolerating diet, 580 PO  + ostomy function    Objective:     Blood pressure 113/64, pulse 96, temperature 98 4 °F (36 9 °C), temperature source Oral, resp  rate 22, height 5' 6" (1 676 m), weight 83 5 kg (184 lb 1 4 oz), SpO2 94 %  ,Body mass index is 29 71 kg/m²  Intake/Output Summary (Last 24 hours) at 05/28/18 4619  Last data filed at 05/28/18 5900   Gross per 24 hour   Intake              580 ml   Output             3150 ml   Net            -2570 ml       Invasive Devices     Peripheral Intravenous Line            Peripheral IV 05/24/18 Right;Upper Arm 3 days          Drain            Closed/Suction Drain Left LLQ Bulb 10 Fr  5 days    Closed/Suction Drain Left LLQ Bulb 10 Fr  5 days    Colostomy  RUQ 5 days                Physical Exam:   Gen: A&O, NAD  Cardio: RRR  Lungs: CTAB  Abd: Soft, non distended, non tender  Midline steri strips w/ serosang saturation, intact  Green stool in ostomy   Serous fluid in SANTA      Lab, Imaging and other studies:CBC: No results found for: WBC, HGB, HCT, MCV, PLT, ADJUSTEDWBC, MCH, MCHC, RDW, MPV, NRBC, CMP: No results found for: NA, K, CL, CO2, ANIONGAP, BUN, CREATININE, GLUCOSE, CALCIUM, AST, ALT, ALKPHOS, PROT, ALBUMIN, BILITOT, EGFR  VTE Pharmacologic Prophylaxis: Heparin  VTE Mechanical Prophylaxis: sequential compression device

## 2018-05-28 NOTE — PLAN OF CARE
DISCHARGE PLANNING - CARE MANAGEMENT     Discharge to post-acute care or home with appropriate resources Progressing        GASTROINTESTINAL - ADULT     Maintains or returns to baseline bowel function Progressing     Maintains adequate nutritional intake Progressing     Establish and maintain optimal ostomy function Progressing        Nutrition/Hydration-ADULT     Nutrient/Hydration intake appropriate for improving, restoring or maintaining nutritional needs Progressing        Potential for Falls     Patient will remain free of falls Progressing        Prexisting or High Potential for Compromised Skin Integrity     Skin integrity is maintained or improved Progressing        RESPIRATORY - ADULT     Achieves optimal ventilation and oxygenation Progressing        SKIN/TISSUE INTEGRITY - ADULT     Skin integrity remains intact Progressing

## 2018-05-29 LAB
BASOPHILS # BLD MANUAL: 0 THOUSAND/UL (ref 0–0.1)
BASOPHILS NFR MAR MANUAL: 0 % (ref 0–1)
EOSINOPHIL # BLD MANUAL: 0 THOUSAND/UL (ref 0–0.4)
EOSINOPHIL NFR BLD MANUAL: 0 % (ref 0–6)
ERYTHROCYTE [DISTWIDTH] IN BLOOD BY AUTOMATED COUNT: 16.4 % (ref 11.6–15.1)
GLUCOSE SERPL-MCNC: 109 MG/DL (ref 65–140)
GLUCOSE SERPL-MCNC: 149 MG/DL (ref 65–140)
GLUCOSE SERPL-MCNC: 159 MG/DL (ref 65–140)
GLUCOSE SERPL-MCNC: 94 MG/DL (ref 65–140)
HCT VFR BLD AUTO: 33.5 % (ref 36.5–49.3)
HGB BLD-MCNC: 10.9 G/DL (ref 12–17)
LYMPHOCYTES # BLD AUTO: 0.3 THOUSAND/UL (ref 0.6–4.47)
LYMPHOCYTES # BLD AUTO: 2 % (ref 14–44)
MCH RBC QN AUTO: 32.1 PG (ref 26.8–34.3)
MCHC RBC AUTO-ENTMCNC: 32.5 G/DL (ref 31.4–37.4)
MCV RBC AUTO: 99 FL (ref 82–98)
MONOCYTES # BLD AUTO: 0.3 THOUSAND/UL (ref 0–1.22)
MONOCYTES NFR BLD: 2 % (ref 4–12)
MYELOCYTES NFR BLD MANUAL: 1 % (ref 0–1)
NEUTROPHILS # BLD MANUAL: 14.47 THOUSAND/UL (ref 1.85–7.62)
NEUTS SEG NFR BLD AUTO: 95 % (ref 43–75)
NRBC BLD AUTO-RTO: 0 /100 WBCS
PLATELET # BLD AUTO: 208 THOUSANDS/UL (ref 149–390)
PLATELET BLD QL SMEAR: ADEQUATE
PMV BLD AUTO: 10.8 FL (ref 8.9–12.7)
RBC # BLD AUTO: 3.4 MILLION/UL (ref 3.88–5.62)
WBC # BLD AUTO: 15.23 THOUSAND/UL (ref 4.31–10.16)

## 2018-05-29 PROCEDURE — 94640 AIRWAY INHALATION TREATMENT: CPT

## 2018-05-29 PROCEDURE — 82948 REAGENT STRIP/BLOOD GLUCOSE: CPT

## 2018-05-29 PROCEDURE — 99233 SBSQ HOSP IP/OBS HIGH 50: CPT | Performed by: INTERNAL MEDICINE

## 2018-05-29 PROCEDURE — 94760 N-INVAS EAR/PLS OXIMETRY 1: CPT

## 2018-05-29 PROCEDURE — 85007 BL SMEAR W/DIFF WBC COUNT: CPT | Performed by: SURGERY

## 2018-05-29 PROCEDURE — 85027 COMPLETE CBC AUTOMATED: CPT | Performed by: SURGERY

## 2018-05-29 PROCEDURE — 94760 N-INVAS EAR/PLS OXIMETRY 1: CPT | Performed by: SOCIAL WORKER

## 2018-05-29 PROCEDURE — 94640 AIRWAY INHALATION TREATMENT: CPT | Performed by: SOCIAL WORKER

## 2018-05-29 RX ORDER — PREDNISONE 10 MG/1
10 TABLET ORAL DAILY
Status: DISCONTINUED | OUTPATIENT
Start: 2018-06-07 | End: 2018-06-01 | Stop reason: HOSPADM

## 2018-05-29 RX ORDER — LABETALOL HYDROCHLORIDE 5 MG/ML
10 INJECTION, SOLUTION INTRAVENOUS EVERY 6 HOURS PRN
Status: DISCONTINUED | OUTPATIENT
Start: 2018-05-29 | End: 2018-06-01 | Stop reason: HOSPADM

## 2018-05-29 RX ORDER — HYDROMORPHONE HYDROCHLORIDE 2 MG/1
4 TABLET ORAL EVERY 4 HOURS PRN
Status: DISCONTINUED | OUTPATIENT
Start: 2018-05-29 | End: 2018-05-30

## 2018-05-29 RX ORDER — PREDNISONE 20 MG/1
20 TABLET ORAL DAILY
Status: DISCONTINUED | OUTPATIENT
Start: 2018-06-04 | End: 2018-06-01 | Stop reason: HOSPADM

## 2018-05-29 RX ORDER — PREDNISONE 20 MG/1
40 TABLET ORAL DAILY
Status: COMPLETED | OUTPATIENT
Start: 2018-05-29 | End: 2018-05-31

## 2018-05-29 RX ORDER — DIAZEPAM 5 MG/1
5 TABLET ORAL DAILY
Status: COMPLETED | OUTPATIENT
Start: 2018-05-29 | End: 2018-05-29

## 2018-05-29 RX ORDER — ALBUTEROL SULFATE 2.5 MG/3ML
2.5 SOLUTION RESPIRATORY (INHALATION) EVERY 4 HOURS PRN
Status: DISCONTINUED | OUTPATIENT
Start: 2018-05-29 | End: 2018-05-31

## 2018-05-29 RX ORDER — LEVALBUTEROL 1.25 MG/.5ML
1.25 SOLUTION, CONCENTRATE RESPIRATORY (INHALATION)
Status: DISCONTINUED | OUTPATIENT
Start: 2018-05-29 | End: 2018-05-31

## 2018-05-29 RX ORDER — TRAMADOL HYDROCHLORIDE 50 MG/1
50 TABLET ORAL EVERY 6 HOURS PRN
Status: DISCONTINUED | OUTPATIENT
Start: 2018-05-29 | End: 2018-05-31

## 2018-05-29 RX ORDER — TRAMADOL HYDROCHLORIDE 50 MG/1
50 TABLET ORAL EVERY 6 HOURS PRN
Qty: 30 TABLET | Refills: 0 | Status: CANCELLED | OUTPATIENT
Start: 2018-05-29 | End: 2018-06-08

## 2018-05-29 RX ADMIN — HEPARIN SODIUM 5000 UNITS: 5000 INJECTION, SOLUTION INTRAVENOUS; SUBCUTANEOUS at 05:48

## 2018-05-29 RX ADMIN — LEVALBUTEROL HYDROCHLORIDE 1.25 MG: 1.25 SOLUTION, CONCENTRATE RESPIRATORY (INHALATION) at 19:21

## 2018-05-29 RX ADMIN — IPRATROPIUM BROMIDE 0.5 MG: 0.5 SOLUTION RESPIRATORY (INHALATION) at 07:31

## 2018-05-29 RX ADMIN — ATORVASTATIN CALCIUM 40 MG: 40 TABLET, FILM COATED ORAL at 18:01

## 2018-05-29 RX ADMIN — ALBUTEROL SULFATE 2 PUFF: 90 AEROSOL, METERED RESPIRATORY (INHALATION) at 10:28

## 2018-05-29 RX ADMIN — BUDESONIDE AND FORMOTEROL FUMARATE DIHYDRATE 2 PUFF: 160; 4.5 AEROSOL RESPIRATORY (INHALATION) at 20:05

## 2018-05-29 RX ADMIN — IPRATROPIUM BROMIDE 0.5 MG: 0.5 SOLUTION RESPIRATORY (INHALATION) at 13:12

## 2018-05-29 RX ADMIN — NYSTATIN: 100000 POWDER TOPICAL at 09:38

## 2018-05-29 RX ADMIN — LEVALBUTEROL HYDROCHLORIDE 1.25 MG: 1.25 SOLUTION, CONCENTRATE RESPIRATORY (INHALATION) at 13:12

## 2018-05-29 RX ADMIN — HYDROMORPHONE HYDROCHLORIDE 0.5 MG: 1 INJECTION, SOLUTION INTRAMUSCULAR; INTRAVENOUS; SUBCUTANEOUS at 05:48

## 2018-05-29 RX ADMIN — NYSTATIN: 100000 POWDER TOPICAL at 18:02

## 2018-05-29 RX ADMIN — IPRATROPIUM BROMIDE 0.5 MG: 0.5 SOLUTION RESPIRATORY (INHALATION) at 19:21

## 2018-05-29 RX ADMIN — ACETAMINOPHEN 650 MG: 325 TABLET, FILM COATED ORAL at 18:00

## 2018-05-29 RX ADMIN — HYDROMORPHONE HYDROCHLORIDE 4 MG: 2 TABLET ORAL at 15:14

## 2018-05-29 RX ADMIN — LEVALBUTEROL HYDROCHLORIDE 1.25 MG: 1.25 SOLUTION, CONCENTRATE RESPIRATORY (INHALATION) at 07:31

## 2018-05-29 RX ADMIN — PREDNISONE 40 MG: 20 TABLET ORAL at 10:05

## 2018-05-29 RX ADMIN — ASPIRIN 81 MG 81 MG: 81 TABLET ORAL at 09:34

## 2018-05-29 RX ADMIN — DIAZEPAM 5 MG: 5 TABLET ORAL at 09:34

## 2018-05-29 RX ADMIN — NICOTINE 14 MG: 14 PATCH, EXTENDED RELEASE TRANSDERMAL at 09:35

## 2018-05-29 RX ADMIN — AMIODARONE HYDROCHLORIDE 200 MG: 200 TABLET ORAL at 09:34

## 2018-05-29 RX ADMIN — HYDROMORPHONE HYDROCHLORIDE 0.5 MG: 1 INJECTION, SOLUTION INTRAMUSCULAR; INTRAVENOUS; SUBCUTANEOUS at 09:42

## 2018-05-29 RX ADMIN — BUDESONIDE AND FORMOTEROL FUMARATE DIHYDRATE 2 PUFF: 160; 4.5 AEROSOL RESPIRATORY (INHALATION) at 09:32

## 2018-05-29 RX ADMIN — PANTOPRAZOLE SODIUM 40 MG: 40 TABLET, DELAYED RELEASE ORAL at 05:48

## 2018-05-29 RX ADMIN — Medication 400 MCG: at 09:34

## 2018-05-29 RX ADMIN — Medication 30000 UNITS: at 09:34

## 2018-05-29 RX ADMIN — IPRATROPIUM BROMIDE 0.5 MG: 0.5 SOLUTION RESPIRATORY (INHALATION) at 00:34

## 2018-05-29 RX ADMIN — Medication 100 MG: at 09:34

## 2018-05-29 RX ADMIN — AMIODARONE HYDROCHLORIDE 200 MG: 200 TABLET ORAL at 18:00

## 2018-05-29 RX ADMIN — LEVALBUTEROL HYDROCHLORIDE 1.25 MG: 1.25 SOLUTION, CONCENTRATE RESPIRATORY (INHALATION) at 00:33

## 2018-05-29 RX ADMIN — TRAMADOL HYDROCHLORIDE 50 MG: 50 TABLET, FILM COATED ORAL at 13:32

## 2018-05-29 RX ADMIN — HEPARIN SODIUM 5000 UNITS: 5000 INJECTION, SOLUTION INTRAVENOUS; SUBCUTANEOUS at 13:32

## 2018-05-29 RX ADMIN — HYDROMORPHONE HYDROCHLORIDE 4 MG: 2 TABLET ORAL at 22:05

## 2018-05-29 RX ADMIN — HEPARIN SODIUM 5000 UNITS: 5000 INJECTION, SOLUTION INTRAVENOUS; SUBCUTANEOUS at 22:03

## 2018-05-29 RX ADMIN — INSULIN LISPRO 1 UNITS: 100 INJECTION, SOLUTION INTRAVENOUS; SUBCUTANEOUS at 22:03

## 2018-05-29 RX ADMIN — METOPROLOL SUCCINATE 50 MG: 50 TABLET, EXTENDED RELEASE ORAL at 09:34

## 2018-05-29 RX ADMIN — TRAMADOL HYDROCHLORIDE 50 MG: 50 TABLET, FILM COATED ORAL at 20:05

## 2018-05-29 NOTE — MEDICAL STUDENT
Left SANTA drain removed without complications   ~30ml of fluid in bulb at time of removal  Patient is suitable for discharge home from surgical standpoint with wound care

## 2018-05-29 NOTE — SOCIAL WORK
DC Planning:     Therapy recommending Home PT/OT services  Pt with new ileostomy and will need RN VNA services  CM met with the patient to discuss dc recommendations / needs and obtain dc plan preferences  CM offered RN, PT/OT home services; pt stated preference for [facility] and [place] referral sent  Pt requesting home commode; Dr Sahil Ching of SOD A team advised and script paperwork left in pt's chart folder

## 2018-05-29 NOTE — RESPIRATORY THERAPY NOTE
resp care      05/29/18 0731   Inhalation Therapy Tx   $ Inhalation Therapy Performed Yes   $ Pulse Oximetry Spot Check Charge Completed   SpO2 91 %   Pre-Treatment Pulse 86   Breath Sounds Pre-Treatment Bilateral Diminished;Clear   Resp Comments Pt still c/o some sob despite good sats and clear bs  Will change udn to tid and prn hs  Pt also has albuterol mdi prn if needed  Pt cont to have congested cough, but not cooperative with flutter valve  Suni Lightning

## 2018-05-29 NOTE — PROGRESS NOTES
IM Residency Progress Note   Unit/Bed#: [facility] 631-01 Encounter: [id number]  SOD Team A      Yung Carlson 56 y o  male [id number]    Hospital Stay Days: 14    Assessment/Plan:    Principal Problem:    COPD with exacerbation [facility])  Active Problems:    Colitis    Coronary artery disease involving native coronary artery of native heart    Leukocytosis    Dyslipidemia    Acute kidney failure (HCC)    Alcohol abuse    Tobacco abuse    Alcohol dependence with withdrawal (HCC)    Acute respiratory insufficiency    Agitation    Heart failure, systolic, due to idiopathic cardiomyopathy (HCC)    Presence of ileostomy ([facility])    Moderate protein-calorie malnutrition (HCC)    Anemia    Hypocalcemia    Perforation of colon (HCC)    Acute Hypoxic Respiratory Failure, Improved  -Secondary to COPD exacerbation  S/p extubation on 5/23  Patient was not on oxygen at home, currently saturating ~90% on 2L NC   -CXR on 5/26 shows consolidation likely due to atelectasis and less likely pneumonia   -Continue respiratory protocol, xoponex and atrovent treatments, symbicort   -Completed 14 days of solumedrol/prednisone  Pulmonology has signed off at this time  Will begin tapering prednisone starting 5/29  Will do 40 x 3mg days, 30mg x 3 days, 20mg x 3 days, 10mg x 3 days until 6/9   -Titrate oxygen to maintain O2 sat over 88%       Perforated Transverse Colon  -Also with history of Crons/Ulcerative colitis with previous right hemicolectomy  -S/p ex-lap on 5/22 with ileocolectomy, diverting ielostomy, fascial closure and 2x SANTA drains placed  Both SANTA drains have been removed as of 5/29   -S/p Zosyn for 5 day course  -Colorectal surgery on board  Cleared patient for regular diet and discharge with wound care   -Pain control with tramadol po prn and dilaudid po prn      Afib with RVR   -s/p cardioversion on 5/18   Has been in NSR since that time   -Continue amiodarone 200mg po bid until 6/6, then switch to 200mg po daily    -Rate control with metoprolol 25mg po bid   -As per cardiology, patient will not need eliquis unless he develops PAF  -F/u with Dr Trena Barrera as an outpatient for an echo       New Onset Systolic CHF  -Echo on 8/03/61 shows EF 25% with severe diffuse hypokinesis, consistent with G2DD  Previous echo in 12/2016 showed EF 55% with diffuse hypokinesis  -Continue lopressor 25mg po bid    -s/p one dose IV lasix on 5/26 with minimal improvement in respiratory status  -Monitor I/Os, daily weights      NSTEMI type 2  -History of CAD w/ stenting in RCA in 2012   -Continue aspirin 81mg po daily, lipitor 40mg po daily       Alcohol abuse  -Complicated by withdrawal in the ICU, was weaned off precedex     -Continue thiamine 100mg po daily and folate 400mcg po daily  -Will wean off valium      Hyperglycemia  -A1c on 5/24/18 was 6 3   -Continue sliding scale insulin and BS checks      HTN  -BPs have been stable      Hypocalcemia  -Resolved  -Ionized calcium improved to 1 09  S/p several calcium gluconate infusinos  Shock, poa  -Resolved  -Secondary to sepsis vs  Hypovolemia, requiring resuscitation with levophed and massive fluids      DEBBY, poa  -Resolved  -Likely secondary to volume depletion      Thrombocytopenia  -Resolved  -Received 2 units platelets in the OR      DEBBY, poa  -Resolved  -Likely secondary to volume depletion      Thrombocytopenia  -Resolved  -Received 2 units platelets in the OR  Disposition: Medically cleared for discharge as per surgery  Pending placement to SNF  Subjective:   Patient's mood has improved because he has been upgraded to a regular diet  He still complains of some abdominal pain  States that oxycodone makes him nauseous and only the iv dilaudid works  I will switch him to po tramadol and po dilaudid  Hospital Course: As per NP Jarad and Dr Blanche Turner a 56 y  o  male who presents on 05/15 from home with complaint of malaise and weakness as well as incontinence of stool/urine due to inability to get off his couch  [facility] has a past medical history significant for COPD, hypertension, coronary artery disease status post PCI to RCA in 2012, ulcerative colitis/Crohn's s/p right hemicolectomy, alcohol abuse, and tobacco abuse   Per the patient he has had several episodes of emesis daily for at least the past month  [facility] denies any increasing diarrhea or nausea, reports shortness of breath is at baseline, denies any fevers, chest pain, any difficulties voiding and reports urinating 3 to 4 times a day, he does endorse decreased appetite eating approximately 2 meals a day, he reports smoking cigars for 3-4 hours daily, drinking at least 1 bottle of kenji a day   In regards to his alcohol abuse he states that he has not stopped drinking for any significant amount of time and is unclear regarding withdrawal symptoms or seizures   In the emergency room he was significantly short of breath and received an extended nebulizer treatment with good effect   He was profoundly hypotensive and was resuscitated with 3 L of crystalloid, and ultimately started on norepinephrine   He had an acute kidney injury, significant metabolic derangements, an elevated troponin, and a lactic acidosis      Patient was admitted to the ICU, and resuscitated with fluids and levophed  His lactic acidosis improved and he was weaned off pressors  His lactic acidosis resolved and the patient was successesfully taken off pressors, maintaining a MAP >65  DEBBY resolved with IV fluid hydration  Antibiotics were continued initially with vancomycin cefepime and flagyl, which were discontinued after the procalcitonin resulted less than 0 5 and blood cultures were negative  The patient also presented with acute encephalopathy secondary to acute alcohol withdrawal and DTs, was started on scheduled Serax with CIWA protocol, encephalopathy resolved and he is currently on scheduled Valium  Thiamine and folate were started   Ammonia was elevated, and lactulose was given for a period of time during ICU stay       This admission, he developed new onset atrial fibrillation with RVR s/p synchronized cardioversion once, s/p Cardizem and digoxin load, and was started on an amiodarone drip  On the day of transfer he has been rate controlled and in sinus rhythm on amiodarone drip  An ECHO was performed which showed a new drop in LVEF to 25% with diffuse hypokinesis with regional variations, new compared to 2016  He had a mild troponin elevation likely secondary to demand ischemia  EKG showed old septal Q waves and ST depressions in inferior leads  With history of inferior MI s/p RCA stent in 2012, aspirin, atorvastatin and metoprolol were continued  He was started on Eliquis for new onset Afib, which was held prior to ex lap  On 5/19 he was intubated due to acute hypoxic respiratory failure secondary to COPD exacerbation with severe bronchospasm  He was extubated on 5/21  He was receiving Xopenex and Atrovent, Pulmicort, and Solumedrol 40 mg every 8 hours, which was decreased to every 12 hours yesterday and daily today  Pulmicort was discontinued today and Symbicort was started  On 5/22 free air was identified under the diaphragm on chest XR in the setting of history of Crohn's disease- colorectal surgery was consulted and he was taken to the OR for an exploratory laparotomy  A perforation was identified in the transverse colon with ischemic bowel, and he had an ileocolectomy, ileocolic anastomosis, and loop ileostomy, and Zosyn every 6 hours was started  He was re-intubated in the OR and extubated on 5/23  He improved clinically and was medically cleared for transfer to [facility] on 5/24  On the evening of 5/25, patient was found down on the floor at beside  Patient denied hitting head or LOC, states that he was trying to find candy off the floor  Later that evening, he was consistently found to be desaturating secondary to pulling off his nasal cannula   A continuous pulse ox was applied at that time  On  he was noted to have increased abdominal pain and leaking around the ostomy site; this was subsequently cared for by the surgical team  He had one GRACIA drain removed on  and the second one removed on   He was medically cleared for discharge by surgery, however wound care was consulted for ileostomy care instructions  Vitals: Temp (24hrs), Av 9 °F (36 6 °C), Min:97 6 °F (36 4 °C), Max:98 2 °F (36 8 °C)  Current: Temperature: 97 8 °F (36 6 °C)  Vitals:    18 0034 18 0600 18 0731 18 0742   BP:    115/70   BP Location:    Left arm   Pulse:    84   Resp:    18   Temp:    97 8 °F (36 6 °C)   TempSrc:    Oral   SpO2: 94%  91% 91%   Weight:  78 4 kg (172 lb 13 5 oz)     Height:        Body mass index is 27 9 kg/m²  Physical Exam:   General: lying in bed in no acute distress  HEENT: eomi, nc/at  Cardiovascular: reg rate and rhythm  Pulmonary: faint wheezes at the lung bases  Abdominal: ileostomy bag in place  1x gracia drain with serosanguinous fluid  Extremities: no pitting edema  Skin: warm and dry  Neuro: aox3  Intake and Outputs:  I/O last 24 hours: In: 0 [P O :855]  Out: 1928 [Urine:1150; Drains:200; KQFM]  Nutrition:        Diet Orders            Start     Ordered    18 1158  Diet Regular; Regular House  Diet effective now     Question Answer Comment   Diet Type Regular    Regular Regular House    RD to adjust diet per protocol? Yes        18 1157    18 1533  Dietary nutrition supplements  Once     Question Answer Comment   Select Supplement: Earline Cornelio - PRO/90 KCALS    Frequency Breakfast, Lunch, Dinner        18 1532        Invasive lines and devices:   Invasive Devices     Peripheral Intravenous Line            Peripheral IV 18 Right Forearm less than 1 day          Drain            Closed/Suction Drain Left LLQ Bulb 10 Fr  6 days    Colostomy  RUQ 6 days                 Labs: Results from last 7 days  Lab Units 05/29/18  0532 05/28/18  0616 05/26/18  1201 05/24/18  0451   WBC Thousand/uL 15 23* 14 69* 17 22* 13 39*   HEMOGLOBIN g/dL 10 9* 9 9* 12 6 9 0*   HEMATOCRIT % 33 5* 31 5* 39 1 27 9*   PLATELETS Thousands/uL 208 210 216 165   NEUTROS PCT %  --   --   --  87*   MONOS PCT %  --   --   --  6   MONO PCT MAN % 2*  --  10  --        Results from last 7 days  Lab Units 05/28/18  0616 05/26/18  0602 05/24/18  0451 05/23/18  0445   SODIUM mmol/L 140 140 142 142   POTASSIUM mmol/L 4 4 4 4 4 6 5 1   CHLORIDE mmol/L 103 102 104 105   CO2 mmol/L 33* 33* 34* 34*   BUN mg/dL 14 15 18 20   CREATININE mg/dL 0 69 0 71 0 62 0 69   CALCIUM mg/dL 8 2* 7 9* 7 7* 7 4*   TOTAL PROTEIN g/dL  --   --   --  4 7*   BILIRUBIN TOTAL mg/dL  --   --   --  0 97   ALK PHOS U/L  --   --   --  61   ALT U/L  --   --   --  50   AST U/L  --   --   --  40   GLUCOSE RANDOM mg/dL 138 63* 126 175*       Results from last 7 days  Lab Units 05/26/18  0602 05/23/18  0445 05/22/18  1432   MAGNESIUM mg/dL 2 4 2 3 1 8     Lab Results   Component Value Date    PHOS 2 9 05/24/2018    PHOS 2 7 05/23/2018    PHOS 2 7 05/22/2018        Results from last 7 days  Lab Units 05/23/18  0445 05/22/18  1432   INR  1 28* 1 27*   PTT seconds 29 31       0  Lab Value Date/Time   TROPONINI 0 12 (H) 05/20/2018 1753   TROPONINI 0 17 (H) 05/20/2018 1505   TROPONINI 0 17 (H) 05/20/2018 1214   TROPONINI 0 29 (H) 05/19/2018 0438   TROPONINI 0 30 (H) 05/19/2018 0154   TROPONINI 0 32 (H) 05/18/2018 2229   TROPONINI 0 32 (H) 05/18/2018 1909   TROPONINI 1 15 (H) 05/16/2018 1416   TROPONINI 1 24 (H) 05/16/2018 0522   TROPONINI 1 16 (H) 05/16/2018 0209   TROPONINI 1 09 (H) 05/16/2018 0053   TROPONINI 0 85 (H) 05/15/2018 1935   TROPONINI 0 72 (H) 05/15/2018 1728   TROPONINI 0 47 (H) 05/15/2018 1304   TROPONINI 0 02 12/07/2016 1931   TROPONINI 0 04 (H) 12/07/2016 0907   TROPONINI <0 04 05/22/2015 0123       Results from last 7 days  Lab Units 05/22/18  1432   LACTIC ACID mmol/L 1 4     ABG:  Lab Results   Component Value Date    PHART 7 443 05/26/2018    JCE6UAR 46 1 (H) 05/26/2018    PO2ART 72 4 (L) 05/26/2018    [id number] 30 8 (H) 05/26/2018    BEART 5 9 05/26/2018    SOURCE Radial, Left 05/26/2018     Imaging: I have personally reviewed pertinent reports  Ct Chest Abdomen Pelvis Wo Contrast    Result Date: 5/15/2018  Impression: 1  Although exam is limited without intravenous contrast, there appears to be wall thickening centered around the sutures in the hepatic flexure of the colon, with associated inflammatory change and infiltration of the adjacent fat  There may be some involvement of the adjacent descending duodenum  Findings may be related to the history of Crohn's disease/ulcerative colitis  Occult tumor not excluded at this time  Follow-up to resolution recommended  2   Subtle increased ground glass and nodular densities right lung apex may be inflammatory/infectious  CT follow-up in 3-6 months may be considered to ensure resolution  Additional tiny nodular densities bilaterally appear similar  3   Nonobstructing renal calculi  Workstation performed: [id number]     Xr Chest Portable    Result Date: 5/19/2018  Impression: Interval increased interstitial and hazy airspace densities bilaterally  Workstation performed: [id number]     Xr Chest Portable    Result Date: 5/19/2018  Impression: No acute cardiopulmonary disease  Workstation performed: [id number]     Xr Chest Portable    Result Date: 5/19/2018  Impression: No change from 5/17/2018 Workstation performed: [id number]     Xr Chest Portable    Result Date: 5/18/2018  Impression: No acute cardiopulmonary disease   Workstation performed: [id number]     Xr Chest Portable    Result Date: 5/16/2018  Impression: Very small left effusion with bibasilar subsegmental atelectasis Workstation performed: [id number]     Xr Chest Portable    Result Date: 5/15/2018  Impression: No acute cardiopulmonary disease  Findings are stable  Findings are consistent with emergency provider's preliminary reading Workstation performed: [id number]     Xr Chest Portable Icu    Result Date: 5/20/2018  Impression: Improved aeration left lung  Persistent right infrahilar opacity is likely atelectasis  Workstation performed: [id number]     EKG:   Micro:  Lab Results   Component Value Date    BLOODCX No Growth After 5 Days  05/15/2018    BLOODCX No Growth After 5 Days  05/15/2018     Allergies:    Allergies   Allergen Reactions    Other      Brown cloth band aids        Medications:   Scheduled Meds:    Current Facility-Administered Medications:  acetaminophen 650 mg Oral Q6H PRN Nettie Leslie MD   albuterol 2 puff Inhalation Q4H PRN Norman Louder, DO   albuterol 2 5 mg Nebulization Q4H PRN Norman Louder, DO   amiodarone 200 mg Oral BID With Meals Angel García MD   aspirin 81 mg Oral Daily [facility] Goad, DO   atorvastatin 40 mg Oral Daily With Maria Guadalupe Fuchs MD   budesonide-formoterol 2 puff Inhalation BID Brittanie Romero MD   diazepam 5 mg Oral Daily Sisi Myers MD   folic acid 729 mcg Oral Daily [facility] Goad, DO   heparin (porcine) 5,000 Units Subcutaneous Q8H [facility] Aura Colon PA-C   HYDROmorphone 0 5 mg Intravenous Q4H PRN Nettie Leslie MD   insulin lispro 1-6 Units Subcutaneous TID AC Nettie Leslie MD   insulin lispro 1-6 Units Subcutaneous HS Nettie Leslie MD   ipratropium 0 5 mg Nebulization TID Norman Louder, DO   levalbuterol 1 25 mg Nebulization TID Norman Louder, DO   LORazepam 0 5 mg Intravenous Q2H PRN Jah Goad, DO   Or       LORazepam 1 mg Intravenous Q2H PRN Jah Goad, DO   Or       LORazepam 2 mg Intravenous Q2H PRN Jah Goad, DO   metoprolol 5 mg Intravenous Q6H PRN Cindi Cuevas PA-C   metoprolol succinate 50 mg Oral Daily Sisi Myers MD   nicotine 14 mg Transdermal Daily AMALIA Mcdonald   nystatin  Topical BID Sisi Myers MD   oxyCODONE 5 mg Oral Q4H PRN Germán Sethi MD   pantoprazole 40 mg Oral Early Morning Rondal Goltz, DO   [START ON 6/7/2018] predniSONE 10 mg Oral Daily Germán Sethi MD   [START ON 6/4/2018] predniSONE 20 mg Oral Daily Germán Sethi MD   [START ON 6/1/2018] predniSONE 30 mg Oral Daily Germán Sethi MD   predniSONE 40 mg Oral Daily Germán Sethi MD   thiamine 100 mg Oral Daily Rondal Goltz, DO   vitamin A 30,000 Units Oral Daily Raymondo Schimke Zhao     Continuous Infusions:   PRN Meds:    acetaminophen 650 mg Q6H PRN   albuterol 2 puff Q4H PRN   albuterol 2 5 mg Q4H PRN   HYDROmorphone 0 5 mg Q4H PRN   LORazepam 0 5 mg Q2H PRN   Or     LORazepam 1 mg Q2H PRN   Or     LORazepam 2 mg Q2H PRN   metoprolol 5 mg Q6H PRN   oxyCODONE 5 mg Q4H PRN       VTE Pharmacologic Prophylaxis: Heparin  VTE Mechanical Prophylaxis: sequential compression device    Germán Sethi MD

## 2018-05-29 NOTE — PROGRESS NOTES
Progress Note - Colorectal Surgery   Radha Kirkland 56 y o  male MRN: [id number]  Unit/Bed#: [facility] 631-01 Encounter: [id number]    Assessment:  55 yo M with perforated transverse colon 2/2 bowel ischemia s/p ex lap, ileocolectomy with primary anastomosis, diverting ileostomy, fascial closure, SANTA x2 drain placement 5/22    Plan:  - regular diet  - Vitamin A for wound healing  - local wound care -- packing change QD  - OOB, ambulate  - ostomy care -- f/u wound care for teaching, has leaked twice over past two days  - analgesia  - DVT ppx    Subjective/Objective     Subjective: Patient feels well  Reports better appetite  Ostomy leaking per nursing  Objective:     Vitals: Blood pressure 125/68, pulse 77, temperature 97 8 °F (36 6 °C), temperature source Oral, resp  rate 20, height 5' 6" (1 676 m), weight 84 1 kg (185 lb 6 5 oz), SpO2 94 %  ,Body mass index is 29 93 kg/m²  I/O       05/27 0701 - 05/28 0700 05/28 0701 - 05/29 0700    P  O  580 855    Total Intake(mL/kg) 580 (6 9) 855 (10 2)    Urine (mL/kg/hr) 2195 (1 1) 1150 (0 6)    Drains 30 (0) 200 (0 1)    Stool 925 (0 5) 1125 (0 6)    Total Output 3150 2475    Net -2570 -1620                Physical Exam:  GEN: NAD  HEENT: MMM  CV: RRR  Lung: Normal effort  Ab: Soft, NT/ND, dressings C/D/I  Extrem: No CCE  Neuro:  A+Ox3    Lab, Imaging and other studies:   CBC with diff:   Lab Results   Component Value Date    WBC 15 23 (H) 05/29/2018    HGB 10 9 (L) 05/29/2018    HCT 33 5 (L) 05/29/2018    MCV 99 (H) 05/29/2018     05/29/2018    MCH 32 1 05/29/2018    MCHC 32 5 05/29/2018    RDW 16 4 (H) 05/29/2018    MPV 10 8 05/29/2018    NRBC 0 05/29/2018   , BMP/CMP: No results found for: NA, K, CL, CO2, ANIONGAP, BUN, CREATININE, GLUCOSE, CALCIUM, AST, ALT, ALKPHOS, PROT, ALBUMIN, BILITOT, EGFR, Magnesium: No results found for: MAG  VTE Pharmacologic Prophylaxis: Heparin  VTE Mechanical Prophylaxis: sequential compression device

## 2018-05-29 NOTE — SOCIAL WORK
[facility] follow up:     [facility] accepting the patient for home RN, PT/OT services; CM entered [facility] information into EPIC and AVS follow up providers  CANDICE Wyatt and Dr Ofelia Arroyo with SOD A team advised

## 2018-05-30 LAB
BASOPHILS # BLD AUTO: 0.02 THOUSANDS/ΜL (ref 0–0.1)
BASOPHILS NFR BLD AUTO: 0 % (ref 0–1)
EOSINOPHIL # BLD AUTO: 0.01 THOUSAND/ΜL (ref 0–0.61)
EOSINOPHIL NFR BLD AUTO: 0 % (ref 0–6)
ERYTHROCYTE [DISTWIDTH] IN BLOOD BY AUTOMATED COUNT: 16.4 % (ref 11.6–15.1)
GLUCOSE SERPL-MCNC: 100 MG/DL (ref 65–140)
GLUCOSE SERPL-MCNC: 108 MG/DL (ref 65–140)
GLUCOSE SERPL-MCNC: 170 MG/DL (ref 65–140)
GLUCOSE SERPL-MCNC: 177 MG/DL (ref 65–140)
HCT VFR BLD AUTO: 33.9 % (ref 36.5–49.3)
HGB BLD-MCNC: 10.6 G/DL (ref 12–17)
LYMPHOCYTES # BLD AUTO: 1.09 THOUSANDS/ΜL (ref 0.6–4.47)
LYMPHOCYTES NFR BLD AUTO: 6 % (ref 14–44)
MCH RBC QN AUTO: 31.5 PG (ref 26.8–34.3)
MCHC RBC AUTO-ENTMCNC: 31.3 G/DL (ref 31.4–37.4)
MCV RBC AUTO: 101 FL (ref 82–98)
MONOCYTES # BLD AUTO: 0.79 THOUSAND/ΜL (ref 0.17–1.22)
MONOCYTES NFR BLD AUTO: 4 % (ref 4–12)
NEUTROPHILS # BLD AUTO: 16.31 THOUSANDS/ΜL (ref 1.85–7.62)
NEUTS SEG NFR BLD AUTO: 88 % (ref 43–75)
NRBC BLD AUTO-RTO: 0 /100 WBCS
PLATELET # BLD AUTO: 211 THOUSANDS/UL (ref 149–390)
PMV BLD AUTO: 10.9 FL (ref 8.9–12.7)
RBC # BLD AUTO: 3.37 MILLION/UL (ref 3.88–5.62)
WBC # BLD AUTO: 18.46 THOUSAND/UL (ref 4.31–10.16)

## 2018-05-30 PROCEDURE — 97163 PT EVAL HIGH COMPLEX 45 MIN: CPT

## 2018-05-30 PROCEDURE — 94760 N-INVAS EAR/PLS OXIMETRY 1: CPT

## 2018-05-30 PROCEDURE — G8979 MOBILITY GOAL STATUS: HCPCS

## 2018-05-30 PROCEDURE — G8978 MOBILITY CURRENT STATUS: HCPCS

## 2018-05-30 PROCEDURE — 94640 AIRWAY INHALATION TREATMENT: CPT

## 2018-05-30 PROCEDURE — 85025 COMPLETE CBC W/AUTO DIFF WBC: CPT | Performed by: STUDENT IN AN ORGANIZED HEALTH CARE EDUCATION/TRAINING PROGRAM

## 2018-05-30 PROCEDURE — 82948 REAGENT STRIP/BLOOD GLUCOSE: CPT

## 2018-05-30 PROCEDURE — 99232 SBSQ HOSP IP/OBS MODERATE 35: CPT | Performed by: INTERNAL MEDICINE

## 2018-05-30 RX ADMIN — Medication 30000 UNITS: at 09:21

## 2018-05-30 RX ADMIN — IPRATROPIUM BROMIDE 0.5 MG: 0.5 SOLUTION RESPIRATORY (INHALATION) at 13:31

## 2018-05-30 RX ADMIN — BUDESONIDE AND FORMOTEROL FUMARATE DIHYDRATE 2 PUFF: 160; 4.5 AEROSOL RESPIRATORY (INHALATION) at 09:20

## 2018-05-30 RX ADMIN — HEPARIN SODIUM 5000 UNITS: 5000 INJECTION, SOLUTION INTRAVENOUS; SUBCUTANEOUS at 06:35

## 2018-05-30 RX ADMIN — AMIODARONE HYDROCHLORIDE 200 MG: 200 TABLET ORAL at 09:20

## 2018-05-30 RX ADMIN — INSULIN LISPRO 1 UNITS: 100 INJECTION, SOLUTION INTRAVENOUS; SUBCUTANEOUS at 18:06

## 2018-05-30 RX ADMIN — HEPARIN SODIUM 5000 UNITS: 5000 INJECTION, SOLUTION INTRAVENOUS; SUBCUTANEOUS at 21:53

## 2018-05-30 RX ADMIN — PANTOPRAZOLE SODIUM 40 MG: 40 TABLET, DELAYED RELEASE ORAL at 06:35

## 2018-05-30 RX ADMIN — NICOTINE 14 MG: 14 PATCH, EXTENDED RELEASE TRANSDERMAL at 09:18

## 2018-05-30 RX ADMIN — HYDROMORPHONE HYDROCHLORIDE 4 MG: 2 TABLET ORAL at 09:19

## 2018-05-30 RX ADMIN — METOPROLOL SUCCINATE 50 MG: 50 TABLET, EXTENDED RELEASE ORAL at 09:19

## 2018-05-30 RX ADMIN — BUDESONIDE AND FORMOTEROL FUMARATE DIHYDRATE 2 PUFF: 160; 4.5 AEROSOL RESPIRATORY (INHALATION) at 21:54

## 2018-05-30 RX ADMIN — AMIODARONE HYDROCHLORIDE 200 MG: 200 TABLET ORAL at 18:05

## 2018-05-30 RX ADMIN — PREDNISONE 40 MG: 20 TABLET ORAL at 09:19

## 2018-05-30 RX ADMIN — NYSTATIN: 100000 POWDER TOPICAL at 18:07

## 2018-05-30 RX ADMIN — INSULIN LISPRO 1 UNITS: 100 INJECTION, SOLUTION INTRAVENOUS; SUBCUTANEOUS at 21:54

## 2018-05-30 RX ADMIN — TRAMADOL HYDROCHLORIDE 50 MG: 50 TABLET, FILM COATED ORAL at 06:37

## 2018-05-30 RX ADMIN — LEVALBUTEROL HYDROCHLORIDE 1.25 MG: 1.25 SOLUTION, CONCENTRATE RESPIRATORY (INHALATION) at 19:50

## 2018-05-30 RX ADMIN — LEVALBUTEROL HYDROCHLORIDE 1.25 MG: 1.25 SOLUTION, CONCENTRATE RESPIRATORY (INHALATION) at 07:22

## 2018-05-30 RX ADMIN — Medication 100 MG: at 09:19

## 2018-05-30 RX ADMIN — ASPIRIN 81 MG 81 MG: 81 TABLET ORAL at 09:20

## 2018-05-30 RX ADMIN — ATORVASTATIN CALCIUM 40 MG: 40 TABLET, FILM COATED ORAL at 18:05

## 2018-05-30 RX ADMIN — TRAMADOL HYDROCHLORIDE 50 MG: 50 TABLET, FILM COATED ORAL at 21:53

## 2018-05-30 RX ADMIN — Medication 400 MCG: at 09:21

## 2018-05-30 RX ADMIN — IPRATROPIUM BROMIDE 0.5 MG: 0.5 SOLUTION RESPIRATORY (INHALATION) at 07:22

## 2018-05-30 RX ADMIN — IPRATROPIUM BROMIDE 0.5 MG: 0.5 SOLUTION RESPIRATORY (INHALATION) at 19:50

## 2018-05-30 RX ADMIN — NYSTATIN: 100000 POWDER TOPICAL at 09:21

## 2018-05-30 RX ADMIN — TRAMADOL HYDROCHLORIDE 50 MG: 50 TABLET, FILM COATED ORAL at 14:28

## 2018-05-30 RX ADMIN — LEVALBUTEROL HYDROCHLORIDE 1.25 MG: 1.25 SOLUTION, CONCENTRATE RESPIRATORY (INHALATION) at 13:31

## 2018-05-30 RX ADMIN — HEPARIN SODIUM 5000 UNITS: 5000 INJECTION, SOLUTION INTRAVENOUS; SUBCUTANEOUS at 14:29

## 2018-05-30 NOTE — PROGRESS NOTES
Progress Note - Colorectal Surgery   Wily Burgess 56 y o  male MRN: [id number]  Unit/Bed#: [facility] 631-01 Encounter: [id number]    Assessment:  57 yo M with perforated transverse colon 2/2 bowel ischemia s/p ex lap, ileocolectomy with primary anastomosis, diverting ileostomy, fascial closure, SANTA x2 drain placement 5/22    Plan:  - regular diet  - likely d/c remaining SANTA today  - Vitamin A for wound healing  - local wound care -- packing change QD  - OOB, ambulate  - local wound care to ostomy site  - analgesia  - DVT ppx    Subjective/Objective     Subjective: Patient feels well  Wants to go home  Objective:     Vitals: Blood pressure 106/66, pulse 80, temperature 98 8 °F (37 1 °C), temperature source Oral, resp  rate 20, height 5' 6" (1 676 m), weight 78 4 kg (172 lb 13 5 oz), SpO2 92 %  ,Body mass index is 27 9 kg/m²  I/O       05/28 0701 - 05/29 0700 05/29 0701 - 05/30 0700    P  O  855 360    Total Intake(mL/kg) 855 (10 9) 360 (4 6)    Urine (mL/kg/hr) 1150 (0 6) 950 (0 5)    Drains 200 (0 1) 30 (0)    Stool 1125 (0 6) 850 (0 5)    Total Output 2475 1830    Net -1620 -1470                Physical Exam:  GEN: NAD  HEENT: MMM  CV: RRR  Lung: Normal effort  Ab: Soft, NT/ND, incision C/D/I, packing in place with minimal drainage  Extrem: No CCE  Neuro:  A+Ox3    Lab, Imaging and other studies:   CBC with diff:   Lab Results   Component Value Date    WBC 15 23 (H) 05/29/2018    HGB 10 9 (L) 05/29/2018    HCT 33 5 (L) 05/29/2018    MCV 99 (H) 05/29/2018     05/29/2018    MCH 32 1 05/29/2018    MCHC 32 5 05/29/2018    RDW 16 4 (H) 05/29/2018    MPV 10 8 05/29/2018    NRBC 0 05/29/2018   , BMP/CMP: No results found for: NA, K, CL, CO2, ANIONGAP, BUN, CREATININE, GLUCOSE, CALCIUM, AST, ALT, ALKPHOS, PROT, ALBUMIN, BILITOT, EGFR, Magnesium: No results found for: MAG  VTE Pharmacologic Prophylaxis: Heparin  VTE Mechanical Prophylaxis: sequential compression device

## 2018-05-30 NOTE — PHYSICAL THERAPY NOTE
Physical Therapy Evaluation     Patient's Name: Sherrell Aguilar    Admitting Diagnosis  Colitis [K52 9]  Generalized weakness [R53 1]    Problem List  Patient Active Problem List   Diagnosis    Colitis    Spinal stenosis of cervical region    Hypertension    Pericarditis    Coronary artery disease involving native coronary artery of native heart    Leukocytosis    Asthma    Atherosclerosis of coronary artery    [facility] with exacerbation ([facility] )    Dyslipidemia    Acute kidney failure ([place] [address] )    Alcohol abuse    Tobacco abuse    Alcohol dependence with withdrawal ([place] [address] )    Acute respiratory insufficiency    Agitation    Heart failure, systolic, due to idiopathic cardiomyopathy (HCC)    Presence of ileostomy ([place] [address] )    Moderate protein-calorie malnutrition (HCC)    Anemia    Hypocalcemia    [facility] colon ([facility]       Past Medical History  Past Medical History:   Diagnosis Date    Asthma     Cardiac disease     Colitis     Colon polyps     COPD (chronic obstructive pulmonary disease) ([place] [address] )     Coronary artery disease     Diverticulitis     Hyperlipidemia     Hypertension     Myocardial infarction ([place] [address] )     Perforation of colon ([place] [address] )     Ulcerative colitis ([place] [address] )        Past Surgical History  Past Surgical History:   Procedure Laterality Date    ANGIOPLASTY      COLON SURGERY      COLONOSCOPY N/A 10/24/2016    Procedure: COLONOSCOPY;  Surgeon: Violet Tran MD;  [facility]; Service:     CORONARY ANGIOPLASTY WITH STENT PLACEMENT      ESOPHAGOGASTRODUODENOSCOPY N/A 10/24/2016    Procedure: ESOPHAGOGASTRODUODENOSCOPY (EGD); Surgeon: Violet Tran MD;  [facility];   Service:     EXPLORATORY LAPAROTOMY W/ BOWEL RESECTION N/A 5/22/2018    Procedure: EXPLORATORY LAPAROTOMY, ILIOCOLECTOMY, ILIOCOLONIC ANASTAMOSIS, LOOP ILIOSTOMY, REPAIR OF SEROSAL TEAR, EXTENSIVE LYSIS OF ADHESIONS, WOUND VAC PLACEMENT;  Surgeon: Violet Tran MD;  [facility]; Service: Colorectal    AK COLONOSCOPY FLX DX W/COLLJ SPEC WHEN PFRMD N/A 2/6/2018    Procedure: COLONOSCOPY;  Surgeon: Maria Teresa Alexandra MD;  [facility]; Service: Colorectal    TONSILLECTOMY            05/30/18 1001   Note Type   Note type Eval only   Pain Assessment   Pain Assessment FLACC   Pain Type Acute pain;Surgical pain   Pain Location Abdomen; Incision   Pain Descriptors Aching;Discomfort   Pain Frequency Intermittent   Pain Onset Ongoing   Clinical Progression Gradually improving   Effect of Pain on Daily Activities guarding w/ mobility   Patient's Stated Pain Goal No pain   Hospital Pain Intervention(s) Medication (See MAR); Repositioned; Ambulation/increased activity; Distraction; Emotional support   Response to Interventions appears to be comfortable at the end of session   Pain Rating: FLACC (Rest) - Face 0   Pain Rating: FLACC (Rest) - Legs 0   Pain Rating: FLACC (Rest) - Activity 0   Pain Rating: FLACC (Rest) - Cry 0   Pain Rating: FLACC (Rest) - Consolability 0   Score: FLACC (Rest) 0   Pain Rating: FLACC (Activity) - Face 1   Pain Rating: FLACC (Activity) - Legs 0   Pain Rating: FLACC (Activity) - Activity 0   Pain Rating: FLACC (Activity) - Cry 1   Pain Rating: FLACC (Activity) - Consolability 0   Score: FLACC (Activity) 2   Home Living   Type of Home House   Home Layout Two level; Able to live on main level with bedroom/bathroom  (bedroom on 1st floor; bathroom on 2nd;; no KAREN)   Home Equipment Walker  (rollator)   Prior Function   Level of [place] Independent with ADLs and functional mobility  (amb w/o AD)   Lives With Family;Daughter   Receives Help From Family   Restrictions/Precautions   Braces or Orthoses (denies at baseline)   Other Precautions Fall Risk;Pain;Cognitive   General   Additional Pertinent History Cleared for assessment/mobilization (spoke to nsg); also noted initial postop order for abd binder " with hole for ostomy" --> RN notified; pt however reports he was told by MD that he does not need to wear it anymore as it was too tight and did not have it on recently; RN aware and placed a call to sx team; per nsg (who spoke to [facility] w/ sx), pt may use abd binder if he wants to but otherwise does not require it; pt declines donning the binder at that time; nsg aware  Cognition   Overall Cognitive Status WFL   Arousal/Participation Alert   Orientation Level Oriented to person;Oriented to place;Oriented to situation   Memory Decreased recall of recent events;Decreased recall of precautions   Following Commands Follows one step commands without difficulty   Comments Pt is observed in bed; min [place]; agreeable to try mobilization; somewhat flat affect and occasionally irritable;   RUE Assessment   RUE Assessment WFL  (AROM)   LUE Assessment   LUE Assessment WFL  (AROM)   RLE Assessment   RLE Assessment WFL  (AROM)   Strength RLE   RLE Overall Strength (fair/ fair + (grossly))   LLE Assessment   LLE Assessment WFL  (AROM)   Strength LLE   LLE Overall Strength (fair/ fair + (grossly))   Bed Mobility   Supine to Sit 5  Supervision   Additional items Assist x 1;HOB elevated; Increased time required;Verbal cues   Transfers   Sit to Stand 3  Moderate assistance   Additional items Assist x 2; Increased time required;Verbal cues  (from elevated bed surface)   Stand to Sit 3  Moderate assistance   Additional items Assist x 2;Verbal cues   Stand pivot 3  Moderate assistance   Additional items Assist x 2; Increased time required;Verbal cues  (took a few steps from bed to chair transition to (R) side)   Additional Comments Decreased (B) LE control in standing w/ occasional min buckling; further amb was not attempted at that time; upon reaching the chair, pt reports he wants to try more walking using his rollator (in the room); attempted to initiate amb trial w/ rollator and chair follow at that time but pt then declines stating that he will walk later; RN and CM informed;    Ambulation/Elevation   Gait pattern Excessively slow; Short stride   Gait Assistance 3  Moderate assist   Additional items Assist x 2;Verbal cues; Tactile cues   Assistive Device Rolling walker  (will trial rollator next visit)   Distance 3 ft total distance for bed to chair transition; further amb was not performed as outlined above   Balance   Static Sitting Fair  (about 3 min prior to OOB (voiding))   Static Standing Poor   Dynamic Standing Poor -   Ambulatory Poor -   Activity Tolerance   Activity Tolerance Patient limited by fatigue   Nurse Made Aware spoke to Siomara caldera RN   Assessment   Prognosis Guarded   Problem List Decreased strength;Decreased endurance; Impaired balance;Decreased mobility; Impaired judgement;Decreased safety awareness;Decreased skin integrity   Assessment Pt is 56 y o  male admitted with hx of malaise and weakness as well as incontinence of stool/urine due to inability to get off his couch  Dx include: Atrial fibrillation with RVR (s/p cardioversion on 5/18/2018), acute respiratory failure with hypoxia, COPD with acute exacerbation, Type II NSTEMI, shock, new CHF, DEBBY  During the course, pt underwent EXPLORATORY LAPAROTOMY, ILIOCOLECTOMY, ILIOCOLONIC ANASTAMOSIS, LOOP ILIOSTOMY, REPAIR OF SEROSAL TAIL, EXTENSIVE LYSIS OF ADHESIONS, WOUND VAC PLACEMENT on 5/22/2018 (Dx: Free intraperitoneal air)  Pt 's comorbidities affecting POC include:  COPD, hypertension, coronary artery disease status post PCI to RCA in 2012, history of a right hemicolectomy secondary to unknown reason, alcohol abuse, tobacco abuse and ulcerative colitis/Crohn's and personal factors of: steps in the house (to the BR)  Pt's clinical presentation is currently unstable/unpredictable which is evident in abn lab values being monitored/trending, need for input for task focus and mobility technique/safety and (A)x2 required for OOB mobility when usually mobilizing (I) and w/o AD   Pt presents w/ postop guarding, generalized weakness, incl decreased LE strength, decreased functional endurance and activity tolerance, impaired balance, inability to progress further w/ amb at this time, decreased safety awareness/insight into deficits and fall risk  Will cont to follow pt in PT for progressive mobilization to address above functional deficits and to max level of (I), endurance, and safety  Currently recommend rehab upon D/C when medically cleared (pt however declines; nsg and CM informed)  Will cont to follow until then  Barriers to Discharge (steps in the house)   Goals   Patient Goals to walk w/ his rolllator    to go home   STG Expiration Date 06/09/18   Short Term Goal #1 7-10 days  Pt will amb 150 ft w/ rw/rollator, mod (I) in order to facilitate safe return to premorbid environment and at least household amb status  Pt will negotiate 14 steps w/ hand rail and SPC PRN, (S)x1 in order to assure safe navigation in the premorbid living environment (bathroom on the 2nd floor unless 1st floor set-up is arranged)  Pt will achieve (I) level w/ bed mob in order to facilitate safety with OOB and back to bed transitions in own living environment  Pt will perform transfers w/ mod (I) to assure (I) and safety w/ functional mobility/transitions w/ all aspects of mobility/locomotion  Pt will participate in LE therex and balance activities to max progression w/ mobility skills  Plan   Treatment/Interventions Functional transfer training;LE strengthening/ROM; Elevations; Therapeutic exercise; Endurance training;Bed mobility;Gait training;Spoke to nursing;Spoke to case management   PT Frequency Other (Comment)  (3-5 x/week)   Recommendation   Recommendation Post acute IP rehab;OT consult   Equipment Recommended Walker   Modified Hagan Scale   Modified Hagan Scale 4   Barthel Index   Feeding 5   Bathing 0   Grooming Score 5   Dressing Score 5   Bladder Score 10   Bowels Score 0   Toilet Use Score 5   Transfers (Bed/Chair) Score 5   Mobility (Level Surface) Score 0   Stairs Score 0   Barthel Index Score 35     Maria De Jesus Chavez, PT

## 2018-05-30 NOTE — RESTORATIVE TECHNICIAN NOTE
Restorative Specialist Mobility Note       Activity: Ambulate in gleason, Ambulate in room, Bathroom privileges, Chair, Stand at bedside (Educated/encouraged pt to ambulate with assistance 3-4 x's/day  Chair alarm on   Pt callbell, phone/tray within reach )     Assistive Device: Four wheel walker       Harvey GALAN, Restorative Technician, [employer] [facility]

## 2018-05-30 NOTE — PLAN OF CARE
Problem: PHYSICAL THERAPY ADULT  Goal: Performs mobility at highest level of function for planned discharge setting  See evaluation for individualized goals  Treatment/Interventions: Functional transfer training, LE strengthening/ROM, Elevations, Therapeutic exercise, Endurance training, Bed mobility, Gait training, Spoke to nursing, Spoke to case management  Equipment Recommended: Wilma Holland       See flowsheet documentation for full assessment, interventions and recommendations  Prognosis: Guarded  Problem List: Decreased strength, Decreased endurance, Impaired balance, Decreased mobility, Impaired judgement, Decreased safety awareness, Decreased skin integrity  Assessment: Pt is 56 y o  male admitted with hx of malaise and weakness as well as incontinence of stool/urine due to inability to get off his couch  Dx include: Atrial fibrillation with RVR (s/p cardioversion on 5/18/2018), acute respiratory failure with hypoxia, COPD with acute exacerbation, Type II NSTEMI, shock, new CHF, DEBBY  During the course, pt underwent EXPLORATORY LAPAROTOMY, ILIOCOLECTOMY, ILIOCOLONIC ANASTAMOSIS, LOOP ILIOSTOMY, REPAIR OF SEROSAL TAIL, EXTENSIVE LYSIS OF ADHESIONS, WOUND VAC PLACEMENT on 5/22/2018 (Dx: Free intraperitoneal air)  Pt 's comorbidities affecting POC include:  COPD, hypertension, coronary artery disease status post PCI to RCA in 2012, history of a right hemicolectomy secondary to unknown reason, alcohol abuse, tobacco abuse and ulcerative colitis/Crohn's and personal factors of: steps in the house (to the BR)  Pt's clinical presentation is currently unstable/unpredictable which is evident in abn lab values being monitored/trending, need for input for task focus and mobility technique/safety and (A)x2 required for OOB mobility when usually mobilizing (I) and w/o AD   Pt presents w/ postop guarding, generalized weakness, incl decreased LE strength, decreased functional endurance and activity tolerance, impaired balance, inability to progress further w/ amb at this time, decreased safety awareness/insight into deficits and fall risk  Will cont to follow pt in PT for progressive mobilization to address above functional deficits and to max level of (I), endurance, and safety  Currently recommend rehab upon D/C when medically cleared (pt however declines; nsg and CM informed)  Will cont to follow until then  Barriers to Discharge:  (steps in the house)     Recommendation: Post acute IP rehab, OT consult          See flowsheet documentation for full assessment

## 2018-05-30 NOTE — CONSULTS
Please see note for wound, ostomy care nurse, patient seen and teaching with ostomy care provided      Raquel Moore, RN, BSN, [facility]

## 2018-05-30 NOTE — PROGRESS NOTES
IM Residency Progress Note   Unit/Bed#: [facility] 631-01 Encounter: [id number]  SOD Team A      Rena Diaz 56 y o  male [id number]    Hospital Stay Days: 15    Assessment/Plan:    Principal Problem:    COPD with exacerbation [facility])  Active Problems:    Colitis    Coronary artery disease involving native coronary artery of native heart    Leukocytosis    Dyslipidemia    Acute kidney failure (HCC)    Alcohol abuse    Tobacco abuse    Alcohol dependence with withdrawal (HCC)    Acute respiratory insufficiency    Agitation    Heart failure, systolic, due to idiopathic cardiomyopathy (HCC)    Presence of ileostomy (HCC)    Moderate protein-calorie malnutrition (HCC)    Anemia    Hypocalcemia    Perforation of colon (HCC)    Acute Hypoxic Respiratory Failure, Improved  -Secondary to COPD exacerbation  S/p extubation on 5/23 and transition to NC  Patient was not on oxygen at home  Currently saturating 86-94% on room air  -CXR on 5/26 shows consolidation likely due to atelectasis and less likely pneumonia   -Continue respiratory protocol, xoponex and atrovent treatments, symbicort   -Completed 14 days of solumedrol/prednisone  Pulmonology has signed off at this time  Will begin tapering prednisone starting 5/29  Will do 40 x 3mg days, 30mg x 3 days, 20mg x 3 days, 10mg x 3 days until 6/9   -Titrate oxygen to maintain O2 sat over 88%       Perforated Transverse Colon  -Also with history of Crons/Ulcerative colitis with previous right hemicolectomy  -S/p ex-lap on 5/22 with ileocolectomy, diverting ielostomy, fascial closure and 2x SANTA drains placed  Both SANTA drains have been removed as of 5/29   -S/p Zosyn for 5 day course  -Colorectal surgery on board  Cleared patient for regular diet and discharge with wound care   -Pain control with tramadol po prn      Afib with RVR   -s/p cardioversion on 5/18   Has been in NSR since that time   -Continue amiodarone 200mg po bid until 6/6, then switch to 200mg po daily    -Rate control with metoprolol 25mg po bid   -As per cardiology, patient will not need eliquis unless he develops PAF  -F/u with Dr Lisa Peralta as an outpatient for an echo       Systolic CHF  -Echo on 6/95/95 shows EF 25% with severe diffuse hypokinesis, consistent with G2DD  Previous echo in 12/2016 showed EF 55% with diffuse hypokinesis  -Continue lopressor 25mg po bid    -s/p one dose IV lasix on 5/26 with minimal improvement in respiratory status  -Monitor I/Os, daily weights  24 hour I/O 360/2430/-2070  +3069 since admission      NSTEMI type 2  -History of CAD w/ stenting in RCA in 2012   -Continue aspirin 81mg po daily, lipitor 40mg po daily    Alcohol abuse  -Complicated by withdrawal in the ICU, was weaned off precedex     -Continue thiamine 100mg po daily and folate 400mcg po daily  -Will wean off valium      Hyperglycemia  -A1c on 5/24/18 was 6 3   -Continue sliding scale insulin and BS checks      HTN  -BPs have been stable      Hypocalcemia  -Resolved  -Ionized calcium improved to 1 09  S/p several calcium gluconate infusinos  Shock, poa  -Resolved  -Secondary to sepsis vs  Hypovolemia, requiring resuscitation with levophed and massive fluids      DEBBY, poa  -Resolved  -Likely secondary to volume depletion      Thrombocytopenia  -Resolved  -Received 2 units platelets in the OR      DEBBY, poa  -Resolved  -Likely secondary to volume depletion      Thrombocytopenia  -Resolved  -Received 2 units platelets in the OR  Disposition: Medically cleared for discharge with home VNA services  Subjective:   Patient feels well today, was able to tolerate being off NC  He is working with nursing to learn how to care for his ostomy site  Tolerating regular diet  Hospital Course: As per NP Jarad and Dr Cristal Fox a 56 y  o  male who presents on 05/15 from home with complaint of malaise and weakness as well as incontinence of stool/urine due to inability to get off his couch   He has a past medical history significant for COPD, hypertension, coronary artery disease status post PCI to RCA in 2012, ulcerative colitis/Crohn's s/p right hemicolectomy, alcohol abuse, and tobacco abuse   Per the patient he has had several episodes of emesis daily for at least the past month  Boom Blandon denies any increasing diarrhea or nausea, reports shortness of breath is at baseline, denies any fevers, chest pain, any difficulties voiding and reports urinating 3 to 4 times a day, he does endorse decreased appetite eating approximately 2 meals a day, he reports smoking cigars for 3-4 hours daily, drinking at least 1 bottle of kenji a day   In regards to his alcohol abuse he states that he has not stopped drinking for any significant amount of time and is unclear regarding withdrawal symptoms or seizures   In the emergency room he was significantly short of breath and received an extended nebulizer treatment with good effect   He was profoundly hypotensive and was resuscitated with 3 L of crystalloid, and ultimately started on norepinephrine   He had an acute kidney injury, significant metabolic derangements, an elevated troponin, and a lactic acidosis      Patient was admitted to the ICU, and resuscitated with fluids and levophed  His lactic acidosis improved and he was weaned off pressors  His lactic acidosis resolved and the patient was successesfully taken off pressors, maintaining a MAP >65  DEBBY resolved with IV fluid hydration  Antibiotics were continued initially with vancomycin cefepime and flagyl, which were discontinued after the procalcitonin resulted less than 0 5 and blood cultures were negative  The patient also presented with acute encephalopathy secondary to acute alcohol withdrawal and DTs, was started on scheduled Serax with CIWA protocol, encephalopathy resolved and he is currently on scheduled Valium  Thiamine and folate were started  Ammonia was elevated, and lactulose was given for a period of time during ICU stay     This admission, he developed new onset atrial fibrillation with RVR s/p synchronized cardioversion once, s/p Cardizem and digoxin load, and was started on an amiodarone drip  On the day of transfer he has been rate controlled and in sinus rhythm on amiodarone drip  An ECHO was performed which showed a new drop in LVEF to 25% with diffuse hypokinesis with regional variations, new compared to 2016  He had a mild troponin elevation likely secondary to demand ischemia  EKG showed old septal Q waves and ST depressions in inferior leads  With history of inferior MI s/p RCA stent in 2012, aspirin, atorvastatin and metoprolol were continued  He was started on Eliquis for new onset Afib, which was held prior to ex lap  On 5/19 he was intubated due to acute hypoxic respiratory failure secondary to COPD exacerbation with severe bronchospasm  He was extubated on 5/21  He was receiving Xopenex and Atrovent, Pulmicort, and Solumedrol 40 mg every 8 hours, which was decreased to every 12 hours yesterday and daily today  Pulmicort was discontinued today and Symbicort was started  On 5/22 free air was identified under the diaphragm on chest XR in the setting of history of Crohn's disease- colorectal surgery was consulted and he was taken to the OR for an exploratory laparotomy  A perforation was identified in the transverse colon with ischemic bowel, and he had an ileocolectomy, ileocolic anastomosis, and loop ileostomy, and Zosyn every 6 hours was started  He was re-intubated in the OR and extubated on 5/23  He improved clinically and was medically cleared for transfer to [facility] on 5/24  On the evening of 5/25, patient was found down on the floor at beside  Patient denied hitting head or LOC, states that he was trying to find candy off the floor  Later that evening, he was consistently found to be desaturating secondary to pulling off his nasal cannula  A continuous pulse ox was applied at that time   On 5/26 he was noted to have increased abdominal pain and leaking around the ostomy site; this was subsequently cared for by the surgical team  He had one SANTA drain removed on  and the second one removed on   He was medically cleared for discharge by surgery, however wound care was consulted for ileostomy care instructions  CM worked with patient and he was approved for home VNA services  Vitals: Temp (24hrs), Av 1 °F (36 7 °C), Min:97 5 °F (36 4 °C), Max:98 8 °F (37 1 °C)  Current: Temperature: 98 3 °F (36 8 °C)  Vitals:    18 1922 18 2350 18 0550 18 0725   BP:  106/66  110/69   BP Location:  Left arm  Left arm   Pulse:  80  86   Resp:  20  20   Temp:  98 8 °F (37 1 °C)  98 3 °F (36 8 °C)   TempSrc:  Oral  Oral   SpO2: 94% 92%  (!) 86%   Weight:   72 6 kg (160 lb 0 9 oz)    Height:        Body mass index is 25 83 kg/m²  Physical Exam:   General: sitting up in bed, no acute distress  HEENT: eomi, nc/at  Cardiovascular: reg rate and rhythm  Pulmonary: clear bilaterally  Abdominal: ostomy draining green stool  Surgical dressing in place  Extremities: no pitting edema  Skin: bruising over RLE  Neuro: aox3    Intake and Outputs:  I/O last 24 hours: In: 360 [P O :360]  Out: 4273 [Urine:1150; Drains:30; JIMUR:9972]  Nutrition:        Diet Orders            Start     Ordered    18 1158  Diet Regular; Regular House  Diet effective now     Question Answer Comment   Diet Type Regular    Regular Regular House    RD to adjust diet per protocol? Yes        18 1157    18 1533  Dietary nutrition supplements  Once     Question Answer Comment   Select Supplement: Roya Cazares - PRO/90 KCALS    Frequency Breakfast, Lunch, Dinner        18 1532        Invasive lines and devices:   Invasive Devices     Peripheral Intravenous Line            Peripheral IV 18 Right Wrist less than 1 day          Drain            Colostomy  RUQ 7 days                 Labs:     Results from last 7 days  Lab Units 05/30/18  0546 05/29/18  0532 05/28/18  0616 05/26/18  1201 05/24/18  0451   WBC Thousand/uL 18 46* 15 23* 14 69* 17 22* 13 39*   HEMOGLOBIN g/dL 10 6* 10 9* 9 9* 12 6 9 0*   HEMATOCRIT % 33 9* 33 5* 31 5* 39 1 27 9*   PLATELETS Thousands/uL 211 208 210 216 165   NEUTROS PCT % 88*  --   --   --  87*   MONOS PCT % 4  --   --   --  6   MONO PCT MAN %  --  2*  --  10  --        Results from last 7 days  Lab Units 05/28/18  0616 05/26/18  0602 05/24/18  0451   SODIUM mmol/L 140 140 142   POTASSIUM mmol/L 4 4 4 4 4 6   CHLORIDE mmol/L 103 102 104   CO2 mmol/L 33* 33* 34*   BUN mg/dL 14 15 18   CREATININE mg/dL 0 69 0 71 0 62   CALCIUM mg/dL 8 2* 7 9* 7 7*   GLUCOSE RANDOM mg/dL 138 63* 126       Results from last 7 days  Lab Units 05/26/18  0602   MAGNESIUM mg/dL 2 4     Lab Results   Component Value Date    PHOS 2 9 05/24/2018    PHOS 2 7 05/23/2018    PHOS 2 7 05/22/2018            0  Lab Value Date/Time   TROPONINI 0 12 (H) 05/20/2018 1753   TROPONINI 0 17 (H) 05/20/2018 1505   TROPONINI 0 17 (H) 05/20/2018 1214   TROPONINI 0 29 (H) 05/19/2018 0438   TROPONINI 0 30 (H) 05/19/2018 0154   TROPONINI 0 32 (H) 05/18/2018 2229   TROPONINI 0 32 (H) 05/18/2018 1909   TROPONINI 1 15 (H) 05/16/2018 1416   TROPONINI 1 24 (H) 05/16/2018 0522   TROPONINI 1 16 (H) 05/16/2018 0209   TROPONINI 1 09 (H) 05/16/2018 0053   TROPONINI 0 85 (H) 05/15/2018 1935   TROPONINI 0 72 (H) 05/15/2018 1728   TROPONINI 0 47 (H) 05/15/2018 1304   TROPONINI 0 02 12/07/2016 1931   TROPONINI 0 04 (H) 12/07/2016 0907   TROPONINI <0 04 05/22/2015 0123         ABG:  Lab Results   Component Value Date    PHART 7 443 05/26/2018    UPX2NRS 46 1 (H) 05/26/2018    PO2ART 72 4 (L) 05/26/2018    DGX5CQE 30 8 (H) 05/26/2018    BEART 5 9 05/26/2018    SOURCE Radial, Left 05/26/2018     Imaging: I have personally reviewed pertinent reports  Ct Chest Abdomen Pelvis Wo Contrast    Result Date: 5/15/2018  Impression: 1    Although exam is limited without intravenous contrast, there appears to be wall thickening centered around the sutures in the hepatic flexure of the colon, with associated inflammatory change and infiltration of the adjacent fat  There may be some involvement of the adjacent descending duodenum  Findings may be related to the history of Crohn's disease/ulcerative colitis  Occult tumor not excluded at this time  Follow-up to resolution recommended  2   Subtle increased ground glass and nodular densities right lung apex may be inflammatory/infectious  CT follow-up in 3-6 months may be considered to ensure resolution  Additional tiny nodular densities bilaterally appear similar  3   Nonobstructing renal calculi  Workstation performed: [id number]     Xr Chest Portable    Result Date: 5/19/2018  Impression: Interval increased interstitial and hazy airspace densities bilaterally  Workstation performed: [id number]     Xr Chest Portable    Result Date: 5/19/2018  Impression: No acute cardiopulmonary disease  Workstation performed: [id number]     Xr Chest Portable    Result Date: 5/19/2018  Impression: No change from 5/17/2018 Workstation performed: [id number]     Xr Chest Portable    Result Date: 5/18/2018  Impression: No acute cardiopulmonary disease  Workstation performed: [id number]     Xr Chest Portable    Result Date: 5/16/2018  Impression: Very small left effusion with bibasilar subsegmental atelectasis Workstation performed: [id number]     Xr Chest Portable    Result Date: 5/15/2018  Impression: No acute cardiopulmonary disease  Findings are stable  Findings are consistent with emergency provider's preliminary reading Workstation performed: [id number]     Xr Chest Portable Icu    Result Date: 5/20/2018  Impression: Improved aeration left lung  Persistent right infrahilar opacity is likely atelectasis   Workstation performed: [id number]     EKG:   Micro:  Lab Results   Component Value Date    BLOODCX No Growth After 5 Days  05/15/2018    BLOODCX No Growth After 5 Days  05/15/2018     Allergies:    Allergies   Allergen Reactions    Other      Brown cloth band aids        Medications:   Scheduled Meds:    Current Facility-Administered Medications:  acetaminophen 650 mg Oral Q6H PRN Daly Ramsay MD   albuterol 2 puff Inhalation Q4H PRN Sheryl Lewis, DO   albuterol 2 5 mg Nebulization Q4H PRN Sheryl Santanava, DO   amiodarone 200 mg Oral BID With Meals Erin Owen MD   aspirin 81 mg Oral Daily Diomedes Seymour, DO   atorvastatin 40 mg Oral Daily With Danyelle Gooden MD   budesonide-formoterol 2 puff Inhalation BID Kody Panda MD   folic acid 839 mcg Oral Daily Diomedes Seymour,    heparin (porcine) 5,000 Units Subcutaneous Q8H [facility] Aura Colon PA-C   HYDROmorphone 4 mg Oral Q4H PRN Angela Escalera MD   insulin lispro 1-6 Units Subcutaneous TID AC Daly Ramsay MD   insulin lispro 1-6 Units Subcutaneous HS Daly Ramsay MD   ipratropium 0 5 mg Nebulization TID Sheryl Lewis,    labetalol 10 mg Intravenous Q6H PRN Daly Ramsay MD   levalbuterol 1 25 mg Nebulization TID Sheryl Lewis, DO   metoprolol succinate 50 mg Oral Daily Angela Escalera MD   nicotine 14 mg Transdermal Daily AMALIA Smyth   nystatin  Topical BID Angela Escalera MD   pantoprazole 40 mg Oral Early Morning Diomedes Seymour DO   [START ON 6/7/2018] predniSONE 10 mg Oral Daily Daly Ramsay MD   [START ON 6/4/2018] predniSONE 20 mg Oral Daily Daly Ramsay MD   [START ON 6/1/2018] predniSONE 30 mg Oral Daily Daly Ramsay MD   predniSONE 40 mg Oral Daily Daly Ramsay MD   thiamine 100 mg Oral Daily Diomedes Seymour DO   traMADol 50 mg Oral Q6H PRN Angela Escalera MD   vitamin A 30,000 Units Oral Daily Fawad Zhao     Continuous Infusions:   PRN Meds:    acetaminophen 650 mg Q6H PRN   albuterol 2 puff Q4H PRN   albuterol 2 5 mg Q4H PRN   HYDROmorphone 4 mg Q4H PRN   labetalol 10 mg Q6H PRN   traMADol 50 mg Q6H PRN       VTE Pharmacologic Prophylaxis: Heparin  VTE Mechanical Prophylaxis: sequential compression device    Nettie Leslie MD

## 2018-05-30 NOTE — WOUND OSTOMY CARE
Progress Note- Ostomy  John Jj 56 y o  male  [id number]  [facility] 631-[facility] 631-01        Assessment:  POD # 8 Patient is a 56year old male admitted with malaise, weakness, urinary and stool incontinence with SOB on 5/15  He underwent exp  Lap ileocolectomy with primary anastomosis and diverting loop illeostomy for perforated colon secondary to ischemic bowel  He was seen this morning for pouch change and ostomy care teaching, patient initially declining educations stating, he is not ready for learning and prefers ET nurse to make another time for learning, however at the same time expresses concerns about going home and not being able to care for himself  Patient was shown "Life after Ileostomy surgery' to read, immediately responded with saying, "I don't even know what this is so how am supposed to read that?" Firm encouragement given for learning, patient eventually agreed for teaching  Stoma is barely budded above skin level, maroon in color and friable  Peristomal skin from 2-11 is macerated and mildly denuded secondary to leakage, mucocutaneous junction remains intact  Stoma measures 1 1/2 inch and irregularly round, patent with (+) flatus and green effluent with food residual    Teaching started by reviewing what an Ileostomy is, how it function, expected changes as healing continues and daily care  Hands on demonstration given on emptying, changing pouch and treating peristomal skin breakdown  Patient closely watch pouch change while asking questions and repeating back verbal instructions  Patient has a daughter who lives with him, per patient he wants his daughter her next time so she can learn as well as help care for his ostomy  Supplies ordered and placed in room, patient encourage learning material given, ostomy supplies catalog provided and encouraged asking for ET nurse for any questions  Daughter is to visit tomorrow, patient to have his nurse page ET nurse for teaching  Plan:   We will continue following patient for ostomy care and teaching  Vitals:    05/30/18 1338   BP:    Pulse:    Resp:    Temp:    SpO2: 90%       Incision 05/22/18 Abdomen Other (Comment) (Active)   Incision Description Drainage 5/30/2018  9:19 AM   Elida-wound Assessment Edema; Erythema;Fragile 5/30/2018  9:19 AM   Closure Adhesive closure strips 5/30/2018  9:19 AM   Drainage Amount Moderate 5/30/2018  9:19 AM   Drainage Description Bloody; Serosanguineous 5/30/2018  9:19 AM   Dressing ABD 5/30/2018  9:19 AM   Wound packed? Yes 5/30/2018  9:19 AM   Packing- # removed 1 5/30/2018  9:19 AM   Packing- # inserted 1 5/30/2018  9:19 AM   Dressing Changed Changed 5/30/2018  9:19 AM   Patient Tolerance Tolerated well 5/30/2018  9:19 AM   Dressing Status Clean;Dry; Intact; Old drainage 5/30/2018  9:19 AM   Number of days: 8       Incision 05/28/18 Abdomen Left; Lower (Active)   Incision Description Drainage 5/30/2018  9:19 AM   Elida-wound Assessment Erythema;Edema 5/30/2018  9:19 AM   Closure Other (Comment) 5/30/2018  9:19 AM   Drainage Amount Moderate 5/30/2018  9:19 AM   Drainage Description Serous 5/30/2018  9:19 AM   Treatments Site care 5/28/2018  9:00 PM   Dressing Dry dressing 5/30/2018  9:19 AM   Dressing Changed Changed 5/30/2018  9:19 AM   Patient Tolerance Tolerated well 5/30/2018  9:19 AM   Dressing Status Clean;Dry; Intact 5/30/2018  9:19 AM   Number of days: 2     Colostomy  RUQ (Active)   Stomal Appliance 1 piece 5/30/2018  9:57 AM   Stoma Assessment Budded;Red 5/30/2018  9:57 AM   Stoma size (in) 1 5 in 5/30/2018  9:57 AM   Stoma Shape Oval;Budded 5/30/2018  9:57 AM   Peristomal Assessment Excoriation 5/30/2018  9:57 AM   Treatment Bag change;Site care 5/30/2018  9:57 AM   Output (mL) 350 mL 5/30/2018 11:00 AM   Number of days: 8       New pouch applied after treating peristomal skin excoriation with crusting using stoma powder and skin prep, treated skin further isolated using small jordon ring and per patient's preference used 2 piece high output pouching system  Verbal and hand on teaching given throughout 80 minute of visit  Ostomy supplies are placed in patient's room  Wound care to continue following  Alexander Fisher, RN, BSN, [facility]

## 2018-05-31 LAB
GLUCOSE SERPL-MCNC: 131 MG/DL (ref 65–140)
GLUCOSE SERPL-MCNC: 134 MG/DL (ref 65–140)
GLUCOSE SERPL-MCNC: 156 MG/DL (ref 65–140)
GLUCOSE SERPL-MCNC: 295 MG/DL (ref 65–140)

## 2018-05-31 PROCEDURE — 94760 N-INVAS EAR/PLS OXIMETRY 1: CPT

## 2018-05-31 PROCEDURE — 94640 AIRWAY INHALATION TREATMENT: CPT

## 2018-05-31 PROCEDURE — 99232 SBSQ HOSP IP/OBS MODERATE 35: CPT | Performed by: INTERNAL MEDICINE

## 2018-05-31 PROCEDURE — 82948 REAGENT STRIP/BLOOD GLUCOSE: CPT

## 2018-05-31 RX ORDER — TRAMADOL HYDROCHLORIDE 50 MG/1
100 TABLET ORAL EVERY 8 HOURS PRN
Status: DISCONTINUED | OUTPATIENT
Start: 2018-05-31 | End: 2018-06-01 | Stop reason: HOSPADM

## 2018-05-31 RX ORDER — ALBUTEROL SULFATE 2.5 MG/3ML
2.5 SOLUTION RESPIRATORY (INHALATION) EVERY 6 HOURS PRN
Status: DISCONTINUED | OUTPATIENT
Start: 2018-05-31 | End: 2018-06-01 | Stop reason: HOSPADM

## 2018-05-31 RX ADMIN — NYSTATIN: 100000 POWDER TOPICAL at 18:14

## 2018-05-31 RX ADMIN — HEPARIN SODIUM 5000 UNITS: 5000 INJECTION, SOLUTION INTRAVENOUS; SUBCUTANEOUS at 21:04

## 2018-05-31 RX ADMIN — HEPARIN SODIUM 5000 UNITS: 5000 INJECTION, SOLUTION INTRAVENOUS; SUBCUTANEOUS at 14:34

## 2018-05-31 RX ADMIN — LEVALBUTEROL HYDROCHLORIDE 1.25 MG: 1.25 SOLUTION, CONCENTRATE RESPIRATORY (INHALATION) at 07:53

## 2018-05-31 RX ADMIN — NYSTATIN: 100000 POWDER TOPICAL at 08:33

## 2018-05-31 RX ADMIN — AMIODARONE HYDROCHLORIDE 200 MG: 200 TABLET ORAL at 18:13

## 2018-05-31 RX ADMIN — METOPROLOL SUCCINATE 50 MG: 50 TABLET, EXTENDED RELEASE ORAL at 08:31

## 2018-05-31 RX ADMIN — TRAMADOL HYDROCHLORIDE 50 MG: 50 TABLET, FILM COATED ORAL at 07:15

## 2018-05-31 RX ADMIN — INSULIN LISPRO 4 UNITS: 100 INJECTION, SOLUTION INTRAVENOUS; SUBCUTANEOUS at 21:03

## 2018-05-31 RX ADMIN — TRAMADOL HYDROCHLORIDE 100 MG: 50 TABLET, FILM COATED ORAL at 18:13

## 2018-05-31 RX ADMIN — Medication 100 MG: at 08:31

## 2018-05-31 RX ADMIN — Medication 30000 UNITS: at 08:31

## 2018-05-31 RX ADMIN — INSULIN LISPRO 1 UNITS: 100 INJECTION, SOLUTION INTRAVENOUS; SUBCUTANEOUS at 18:13

## 2018-05-31 RX ADMIN — ACETAMINOPHEN 650 MG: 325 TABLET, FILM COATED ORAL at 12:50

## 2018-05-31 RX ADMIN — BUDESONIDE AND FORMOTEROL FUMARATE DIHYDRATE 2 PUFF: 160; 4.5 AEROSOL RESPIRATORY (INHALATION) at 08:30

## 2018-05-31 RX ADMIN — IPRATROPIUM BROMIDE 0.5 MG: 0.5 SOLUTION RESPIRATORY (INHALATION) at 07:53

## 2018-05-31 RX ADMIN — ATORVASTATIN CALCIUM 40 MG: 40 TABLET, FILM COATED ORAL at 18:13

## 2018-05-31 RX ADMIN — AMIODARONE HYDROCHLORIDE 200 MG: 200 TABLET ORAL at 08:31

## 2018-05-31 RX ADMIN — PANTOPRAZOLE SODIUM 40 MG: 40 TABLET, DELAYED RELEASE ORAL at 07:13

## 2018-05-31 RX ADMIN — PREDNISONE 40 MG: 20 TABLET ORAL at 08:31

## 2018-05-31 RX ADMIN — HEPARIN SODIUM 5000 UNITS: 5000 INJECTION, SOLUTION INTRAVENOUS; SUBCUTANEOUS at 07:13

## 2018-05-31 RX ADMIN — Medication 400 MCG: at 08:31

## 2018-05-31 RX ADMIN — ASPIRIN 81 MG 81 MG: 81 TABLET ORAL at 08:31

## 2018-05-31 RX ADMIN — NICOTINE 14 MG: 14 PATCH, EXTENDED RELEASE TRANSDERMAL at 08:29

## 2018-05-31 RX ADMIN — BUDESONIDE AND FORMOTEROL FUMARATE DIHYDRATE 2 PUFF: 160; 4.5 AEROSOL RESPIRATORY (INHALATION) at 21:04

## 2018-05-31 NOTE — PROGRESS NOTES
End of Service Note    Hospital Course 5/15/18 - 5/31/18  Taz Bird is a [age] [address] [place] y  o  with a past medical history significant for COPD, hypertension, coronary artery disease status post PCI to RCA in 2012, ulcerative colitis/Crohn's s/p right hemicolectomy, alcohol abuse, and tobacco abuse who presented from home on 5/15/18 with complaints of malaise and weakness as well as incontinence of stool/urine due to inability to get off his couch  The patient had several episodes of emesis daily for at least the past month  [facility] denied any increasing diarrhea or nausea, reports shortness of breath is at baseline, and was drinking at least 1 bottle of kenji a day  In the emergency room he was significantly short of breath and received an extended nebulizer treatment with good effect   He was profoundly hypotensive and was resuscitated with 3 L of crystalloid, and ultimately started on norepinephrine   He had an acute kidney injury, significant metabolic derangements, an elevated troponin, and a lactic acidosis      Patient was admitted to the ICU, and resuscitated with fluids and levophed  His lactic acidosis improved and he was weaned off pressors  His lactic acidosis resolved and the patient was successesfully taken off pressors, maintaining a MAP >65  DEBBY resolved with IV fluid hydration   Antibiotics were continued initially with vancomycin cefepime and flagyl, which were discontinued after the procalcitonin resulted less than 0 5 and blood cultures were negative  The patient also presented with acute encephalopathy secondary to acute alcohol withdrawal and DTs, was started on scheduled Serax with CIWA protocol, encephalopathy resolved and he is currently on scheduled Valium  Thiamine and folate were started   Ammonia was elevated, and lactulose was given for a period of time during ICU stay       During this admission admission, he developed new onset atrial fibrillation with RVR s/p synchronized cardioversion once, s/p Cardizem and digoxin load, and was started on an amiodarone drip  On the day of transfer he has been rate controlled and in sinus rhythm on amiodarone drip  An ECHO was performed which showed a new drop in LVEF to 25% with diffuse hypokinesis with regional variations, new compared to 2016  He had a mild troponin elevation likely secondary to demand ischemia  EKG showed old septal Q waves and ST depressions in inferior leads  With history of inferior MI s/p RCA stent in 2012, aspirin, atorvastatin and metoprolol were continued  He was started on Eliquis for new onset Afib, which was held prior to ex lap  On 5/19 he was intubated due to acute hypoxic respiratory failure secondary to COPD exacerbation with severe bronchospasm  He was extubated on 5/21  He was receiving Xopenex and Atrovent, Pulmicort, and Solumedrol 40 mg every 8 hours, which was decreased to every 12 hours yesterday and daily today  Pulmicort was discontinued today and Symbicort was started  On 5/22 free air was identified under the diaphragm on chest XR in the setting of history of Crohn's disease- colorectal surgery was consulted and he was taken to the OR for an exploratory laparotomy  A perforation was identified in the transverse colon with ischemic bowel, and he had an ileocolectomy, ileocolic anastomosis, and loop ileostomy with placement of ostomy bag and 2 SANTA drains  He was then started on a 5 day course of zosyn  Patient was eventually extubated on 5/23  He improved clinically and was medically cleared for transfer to [facility] on 5/24      On the evening of 5/25, patient was found down on the floor at beside  Patient denied hitting head or LOC, states that he was trying to find candy off the floor  Later that evening, he was consistently found to be desaturating secondary to pulling off his nasal cannula  A continuous pulse ox was applied at that time   On 5/26 he was noted to have increased abdominal pain and leaking around the ostomy site; this was subsequently cared for by the surgical team  He had one SANTA drain removed on 5/28 and the second one removed on 5/29  He finished a 14 day course of steroids on 4/28, and was started on a prednisone taper  He was eventually weaned off nasal cannula, and initially medically cleared for discharge on 5/30 with home VNA services after refusing placement to a SNF  However that day he was found to be periodically desaturating to the mid 80's while on room air  CM subsequently applied for home O2 for the patient  Discharge Planning  Patient is medically cleared for discharge with home VNA services, pending home O2 delivery  He is currently on a steroid taper for COPD that is set to finish on 6/9/18  Patient was hyperglycemic during this admission, requiring SSI  However he can go home without the need for insulin as he is only pre-diabetic and steroids contributed to his hyperglycemia  He was also noted to have afib with RVR (since converted to NSR) and new onset CHF  As per cardiology, he does not need to be on eliquis unless he develops a-fib again  His current regimen of amiodarone 200mg po bid should be weaned to 200mg po daily starting on 6/6/18

## 2018-05-31 NOTE — RESPIRATORY THERAPY NOTE
RT Protocol Note  Render Rk 66 y o  male MRN: [id number]  Unit/Bed#: [facility] 631-01 Encounter: [id number]    Assessment    Principal Problem:    COPD with exacerbation [facility])  Active Problems:    Colitis    Coronary artery disease involving native coronary artery of native heart    Leukocytosis    Dyslipidemia    Acute kidney failure (HCC)    Alcohol abuse    Tobacco abuse    Alcohol dependence with withdrawal (HCC)    Acute respiratory insufficiency    Agitation    Heart failure, systolic, due to idiopathic cardiomyopathy (HCC)    Presence of ileostomy ([facility])    Moderate protein-calorie malnutrition (HCC)    Anemia    Hypocalcemia    Perforation of colon ([facility])      Home Pulmonary Medications:  Albuterol prn       Past Medical History:   Diagnosis Date    Asthma     Cardiac disease     Colitis     Colon polyps     COPD (chronic obstructive pulmonary disease) ([facility]     Coronary artery disease     Diverticulitis     Hyperlipidemia     Hypertension     Myocardial infarction ([facility] )     Perforation of colon ([facility] )     Ulcerative colitis ([facility] )      Social History     Social History    Marital status:      Spouse name: N/A    Number of children: N/A    Years of education: N/A     Social History Main Topics    Smoking status: Current Every Day Smoker     Types: Cigars    Smokeless tobacco: Never Used    Alcohol use Yes      Comment: kenji occassionally    Drug use: No    Sexual activity: Not Asked     Other Topics Concern    None     Social History Narrative    None       Subjective    Subjective Data: trouble breaTHING    Objective    Physical Exam:   Assessment Type: Assess only  General Appearance: Alert, Awake  Respiratory Pattern: Normal  Chest Assessment: Chest expansion symmetrical  Bilateral Breath Sounds: Diminished  Cough: Non-productive    Vitals:  Blood pressure 116/74, pulse 75, temperature 98 3 °F (36 8 °C), temperature source Oral, resp   rate 20, height 5' 6" (1 676 m), weight 72 8 kg (160 lb 7 9 oz), SpO2 (!) 86 %  Results from last 7 days  Lab Units 05/26/18  0906   PH ART  7 443   PCO2 ART mm Hg 46 1*   PO2 ART mm Hg 72 4*   HCO3 ART mmol/L 30 8*   BASE EXC ART mmol/L 5 9   O2 CONTENT ART mL/dL 16 0   O2 HGB, ARTERIAL % 93 2*   ABG SOURCE  Radial, Left   ONDINA TEST  Yes       Imaging and other studies: I have personally reviewed pertinent reports  O2 Device: nc     Plan    Respiratory Plan: Discontinue Protocol        Resp Comments: Pt re evaluated per resp protocol  He takes albuterol udn prn at home and albuterol HFA daily  His BS have improved and pt is no longer in exacerbation  TID frequency changed to prn per protocol

## 2018-05-31 NOTE — RESTORATIVE TECHNICIAN NOTE
Restorative Specialist Mobility Note       Activity: Other (Comment) (Educated/encouraged pt to ambulate with assistance 3-4 x's/day, pt refused 2* tired/pain in abdomen nursing aware  Chair alarm on   Pt callbell, phone/tray within reach )     Marie GALAN, Restorative Technician, [employer] [facility]

## 2018-05-31 NOTE — PROGRESS NOTES
Patient has had multiple desaturations to 86% while off nasal cannula  Patient will require 2L continuous oxygen at home for hypoxic respiratory failure secondary to COPD  His latest O2 sats are 86% on room air, and 91% on 2L nasal cannula  Patient will need oxygen titrated to keep saturations over 88% to improve morbidity and mortality

## 2018-05-31 NOTE — DISCHARGE SUMMARY
[facility] CENTRAL Discharge Summary - Medical Mervin Greenberg 66 y o  male MRN: [id number]    [address]  Room / Bed: [facility] 631/[facility] [id number] Encounter: [id number]    BRIEF OVERVIEW    Admitting Provider: Zaynab Rankin DO  Discharge Provider:  Judy Cortes DO  Primary Care Physician at Discharge: Judy Cortes DO    Discharge To: Home with VNA    Admission Date: 5/15/2018     Discharge Date: 6/1/2018  4:55 PM    Primary Discharge Diagnosis  Principal Problem:    COPD with exacerbation ([address] )  Active Problems:    Colitis    Coronary artery disease involving native coronary artery of native heart    Leukocytosis    Dyslipidemia    Acute kidney failure (HCC)    Alcohol abuse    Tobacco abuse    Alcohol dependence with withdrawal (HCC)    Acute respiratory insufficiency    Agitation    Heart failure, systolic, due to idiopathic cardiomyopathy (HCC)    Presence of ileostomy (HCC)    Moderate protein-calorie malnutrition (HCC)    Anemia    Hypocalcemia    Perforation of colon ([address] )  Resolved Problems:    Shock ([facility] [address] )    Lactic acidosis    High anion gap metabolic acidosis    Transaminitis    Elevated troponin    Hypokalemia    Hyponatremia    Bronchospasm, acute    Free intraperitoneal air      Acute hypoxic respiratory failure  · Secondary to COPD exacerbation  · Resolved however patient will require chronic oxygen therapy at home   · Continue  Xopenex and Atrovent treatments, Symbicort  · Continue prednisone taper which will conclude on June 9th        2   Perforated transverse colon  · Patient with history of Crohn's/ulcerative colitis with previous right hemicolectomy  · Will follow up in 1 week with surgery  · Patient given Tylenol p r n  mild-to-moderate pain, tramadol 100 mg b i d  p r n  for severe pain x2 weeks, gabapentin 400 mg t i d       3  Atrial fibrillation with RVR   · Resolved patient status post cardioversion on May 18th    · Continue amiodarone 200 mg p o  b i d  until June 6, then switch to 200 mg p o  daily  · Continue rate control metoprolol 50 mg daily  · As per Cardiology patient will not need Eliquis unless he develops the PAF  · Will need follow-up with Dr Epifanio Bedoya as outpatient for repeat echo     4  Systolic congestive heart failure  · Echo on 5/19/2018 showed EF 25% with severe diffuse hypokinesis and grade 2 diastolic dysfunction  · Will need follow-up echo to check for resolution as may be secondary to a stress myopathy/tachycardia mediated cardiomyopathy     5  Coronary artery disease  · History of CAD with stenting in RCA in 2012  · Continue aspirin 81 mg p o  daily, Lipitor 40 mg p o  daily, metoprolol 50 mg daily     6  Alcohol abuse  · Complicated by withdrawal in ICU   · Has been successfully tapered off Valium  · Continue thiamine 100 mg p o  daily and folate 400 mcg p o  daily     7  Hyperglycemia  · Patient A1c 6 3 on 5/24/2018  · Will not require diabetic medications on discharge his blood sugars been elevated secondary to prednisone which will eventually be tapered off on June 9th  · Will control diet and exercise on outpatient basis     8  Hypertension  · BP within normal limits  · Continue metoprolol 50 mg daily          Consulting Providers   Colorectal surgery  Cardiology  Wound Care    Therapeutic Operative Procedures Performed  ileocolectomy, ileocolic anastomosis, and loop ileostomy    Diagnostic Procedures Performed  Ct Chest Abdomen Pelvis Wo Contrast    Result Date: 5/15/2018  Impression: 1  Although exam is limited without intravenous contrast, there appears to be wall thickening centered around the sutures in the hepatic flexure of the colon, with associated inflammatory change and infiltration of the adjacent fat  There may be some involvement of the adjacent descending duodenum  Findings may be related to the history of Crohn's disease/ulcerative colitis  Occult tumor not excluded at this time  Follow-up to resolution recommended   2   Subtle increased ground glass and nodular densities right lung apex may be inflammatory/infectious  CT follow-up in 3-6 months may be considered to ensure resolution  Additional tiny nodular densities bilaterally appear similar  3   Nonobstructing renal calculi  Workstation performed: [id number]     Xr Chest Portable    Result Date: 5/19/2018  Impression: Interval increased interstitial and hazy airspace densities bilaterally  Workstation performed: [id number]     Xr Chest Portable    Result Date: 5/19/2018  Impression: No acute cardiopulmonary disease  Workstation performed: [id number]     Xr Chest Portable    Result Date: 5/19/2018  Impression: No change from 5/17/2018 Workstation performed: [id number]     Xr Chest Portable    Result Date: 5/18/2018  Impression: No acute cardiopulmonary disease  Workstation performed: [id number]     Xr Chest Portable    Result Date: 5/16/2018  Impression: Very small left effusion with bibasilar subsegmental atelectasis Workstation performed: [id number]     Xr Chest Portable    Result Date: 5/15/2018  Impression: No acute cardiopulmonary disease  Findings are stable  Findings are consistent with emergency provider's preliminary reading Workstation performed: [id number]     Xr Chest Portable Icu    Result Date: 5/20/2018  Impression: Improved aeration left lung  Persistent right infrahilar opacity is likely atelectasis   Workstation performed: [id number]       Results from last 7 days  Lab Units 05/30/18  0546 05/29/18  0532 05/28/18  0616   WBC Thousand/uL 18 46* 15 23* 14 69*   HEMOGLOBIN g/dL 10 6* 10 9* 9 9*   HEMATOCRIT % 33 9* 33 5* 31 5*   PLATELETS Thousands/uL 211 208 210   NEUTROS PCT % 88*  --   --    MONOS PCT % 4  --   --    MONO PCT MAN %  --  2*  --        Results from last 7 days  Lab Units 05/28/18  0616   SODIUM mmol/L 140   POTASSIUM mmol/L 4 4   CHLORIDE mmol/L 103   CO2 mmol/L 33*   BUN mg/dL 14   CREATININE mg/dL 0 69   CALCIUM mg/dL 8 2*   GLUCOSE RANDOM mg/dL 138         Lab Results   Component Value Date    PHOS 2 9 05/24/2018    PHOS 2 7 05/23/2018    PHOS 2 7 05/22/2018            0  Lab Value Date/Time   TROPONINI 0 12 (H) 05/20/2018 1753   TROPONINI 0 17 (H) 05/20/2018 1505   TROPONINI 0 17 (H) 05/20/2018 1214   TROPONINI 0 29 (H) 05/19/2018 0438   TROPONINI 0 30 (H) 05/19/2018 0154   TROPONINI 0 32 (H) 05/18/2018 2229   TROPONINI 0 32 (H) 05/18/2018 1909   TROPONINI 1 15 (H) 05/16/2018 1416   TROPONINI 1 24 (H) 05/16/2018 0522   TROPONINI 1 16 (H) 05/16/2018 0209   TROPONINI 1 09 (H) 05/16/2018 0053   TROPONINI 0 85 (H) 05/15/2018 1935   TROPONINI 0 72 (H) 05/15/2018 1728   TROPONINI 0 47 (H) 05/15/2018 1304   TROPONINI 0 02 12/07/2016 1931   TROPONINI 0 04 (H) 12/07/2016 0907   TROPONINI <0 04 05/22/2015 0123         ABG:  Lab Results   Component Value Date    PHART 7 443 05/26/2018    BLC4GJE 46 1 (H) 05/26/2018    PO2ART 72 4 (L) 05/26/2018    FLA8UBG 30 8 (H) 05/26/2018    BEART 5 9 05/26/2018    SOURCE Radial, Left 05/26/2018       Discharge Disposition: Home with [address] [place] [address] Way  Discharged With Lines: no  Test Results Pending at Discharge: no    Outpatient Follow-Up  F/u with PCP, cardiology, general surgery  Follow up with consulting providers  Cardiology, pulmonology  Active Issues Requiring Follow-up   Ostomy, CHF, COPD    Code Status: Prior  Advance Directive and Living Will: <no information>  Power of :    POLST:      Medications   Current Discharge Medication List      START taking these medications    Details   VENTOLIN  (90 Base) MCG/ACT inhaler Inhale 2 puffs every 4 (four) hours as needed for wheezing or shortness of breath  Qty: 1 Inhaler, Refills: 6    Associated Diagnoses: COPD (chronic obstructive pulmonary disease) ([facility] [address] )           Current Discharge Medication List      CONTINUE these medications which have NOT CHANGED    Details   albuterol (2 5 mg/3 mL) 0 083 % nebulizer solution USE 1 UNIT DOSE EVERY 4-6 HOURS AS NEEDED FOR WHEEZING   Qty: 150 mL, Refills: 4    Associated Diagnoses: COPD (chronic obstructive pulmonary disease) ([facility])      atorvastatin (LIPITOR) 40 mg tablet Take 1 tablet by mouth daily with dinner for 30 days  Qty: 30 tablet, Refills: 0      gabapentin (NEURONTIN) 300 mg capsule Take 1 capsule (300 mg total) by mouth 3 (three) times a day  Qty: 90 capsule, Refills: 0    Associated Diagnoses: Cervical radiculopathy      lisinopril (ZESTRIL) 2 5 mg tablet Take 1 tablet (2 5 mg total) by mouth daily for 90 days  Qty: 90 tablet, Refills: 0    Associated Diagnoses: Essential hypertension      metoprolol tartrate (LOPRESSOR) 25 mg tablet Take 0 5 tablets (12 5 mg total) by mouth every 12 (twelve) hours for 30 days  Qty: 30 tablet, Refills: 0    Associated Diagnoses: Essential hypertension      Mometasone Furo-Formoterol Fum (DULERA) 200-5 MCG/ACT AERO Inhale 2 puffs every 12 (twelve) hours  Qty: 1 Inhaler, Refills: 3    Associated Diagnoses: Asthma, unspecified asthma severity, unspecified whether complicated, unspecified whether persistent      !! Tiotropium Bromide Monohydrate (SPIRIVA RESPIMAT) 2 5 MCG/ACT AERS Inhale 2 puffs daily      !! Tiotropium Bromide Monohydrate (SPIRIVA RESPIMAT) 2 5 MCG/ACT AERS Inhale 1 Act (2 5 mcg total) daily for 90 days  Qty: 1 Inhaler, Refills: 3    Associated Diagnoses: Chronic obstructive pulmonary disease, unspecified COPD type ([facility] [address] )       ! ! - Potential duplicate medications found  Please discuss with provider  Current Discharge Medication List          Allergies  Allergies   Allergen Reactions    Other      Brown cloth band aids        Discharge Diet: cardiac diet  Activity restrictions: none    Rian luis 56 y  o  with a past medical history significant for COPD, hypertension, coronary artery disease status post PCI to RCA in 2012, ulcerative colitis/Crohn's s/p right hemicolectomy, alcohol abuse, and tobacco abuse who presented from home on 5/15/18 with complaints of malaise and weakness as well as incontinence of stool/urine due to inability to get off his couch  The patient had several episodes of emesis daily for at least the past month  Mono Simons denied any increasing diarrhea or nausea, reports shortness of breath is at baseline, and was drinking at least 1 bottle of kenji a day  In the emergency room he was significantly short of breath and received an extended nebulizer treatment with good effect   He was profoundly hypotensive and was resuscitated with 3 L of crystalloid, and ultimately started on norepinephrine   He had an acute kidney injury, significant metabolic derangements, an elevated troponin, and a lactic acidosis      Patient was admitted to the ICU, and resuscitated with fluids and levophed  His lactic acidosis improved and he was weaned off pressors  His lactic acidosis resolved and the patient was successesfully taken off pressors, maintaining a MAP >65  DEBBY resolved with IV fluid hydration   Antibiotics were continued initially with vancomycin cefepime and flagyl, which were discontinued after the procalcitonin resulted less than 0 5 and blood cultures were negative  The patient also presented with acute encephalopathy secondary to acute alcohol withdrawal and DTs, was started on scheduled Serax with CIWA protocol, encephalopathy resolved and he is currently on scheduled Valium  Thiamine and folate were started  Ammonia was elevated, and lactulose was given for a period of time during ICU stay       During this admission admission, he developed new onset atrial fibrillation with RVR s/p synchronized cardioversion once, s/p Cardizem and digoxin load, and was started on an amiodarone drip  On the day of transfer he has been rate controlled and in sinus rhythm on amiodarone drip  An ECHO was performed which showed a new drop in LVEF to 25% with diffuse hypokinesis with regional variations, new compared to 2016  He had a mild troponin elevation likely secondary to demand ischemia  EKG showed old septal Q waves and ST depressions in inferior leads  With history of inferior MI s/p RCA stent in 2012, aspirin, atorvastatin and metoprolol were continued  He was started on Eliquis for new onset Afib, which was held prior to ex lap  On 5/19 he was intubated due to acute hypoxic respiratory failure secondary to COPD exacerbation with severe bronchospasm  He was extubated on 5/21  He was receiving Xopenex and Atrovent, Pulmicort, and Solumedrol 40 mg every 8 hours, which was decreased to every 12 hours yesterday and daily today  Pulmicort was discontinued today and Symbicort was started  On 5/22 free air was identified under the diaphragm on chest XR in the setting of history of Crohn's disease- colorectal surgery was consulted and he was taken to the OR for an exploratory laparotomy  A perforation was identified in the transverse colon with ischemic bowel, and he had an ileocolectomy, ileocolic anastomosis, and loop ileostomy with placement of ostomy bag and 2 SANTA drains  He was then started on a 5 day course of zosyn  Patient was eventually extubated on 5/23  He improved clinically and was medically cleared for transfer to [facility] on 5/24      On the evening of 5/25, patient was found down on the floor at beside  Patient denied hitting head or LOC, states that he was trying to find candy off the floor  Later that evening, he was consistently found to be desaturating secondary to pulling off his nasal cannula  A continuous pulse ox was applied at that time  On 5/26 he was noted to have increased abdominal pain and leaking around the ostomy site; this was subsequently cared for by the surgical team  He had one SANTA drain removed on 5/28 and the second one removed on 5/29  He finished a 14 day course of steroids on 4/28, and was started on a prednisone taper   He was eventually weaned off nasal cannula, and initially medically cleared for discharge on 5/30 with home VNA services after refusing placement to a SNF  However that day he was found to be periodically desaturating to the mid 80's while on room air  CM subsequently applied for home O2 for the patient        Presenting Problem/History of Present Illness  Principal Problem:    COPD with exacerbation ([facility] [address] )  Active Problems:    Colitis    Coronary artery disease involving native coronary artery of native heart    Leukocytosis    Dyslipidemia    Acute kidney failure (HCC)    Alcohol abuse    Tobacco abuse    Alcohol dependence with withdrawal (HCC)    Acute respiratory insufficiency    Agitation    Heart failure, systolic, due to idiopathic cardiomyopathy (HCC)    Presence of ileostomy (HCC)    Moderate protein-calorie malnutrition (HCC)    Anemia    Hypocalcemia    Perforation of colon (HCC)  Resolved Problems:    Shock ([facility] [address] )    Lactic acidosis    High anion gap metabolic acidosis    Transaminitis    Elevated troponin    Hypokalemia    Hyponatremia    Bronchospasm, acute    Free intraperitoneal air      Discharge Condition: stable    Discharge  Statement   I spent 30 minutes discharging the patient  This time was spent on the day of discharge  I had direct contact with the patient on the day of discharge  Additional documentation is required if more than 30 minutes were spent on discharge

## 2018-05-31 NOTE — PROGRESS NOTES
IM Residency Progress Note   [facility] Encounter: [id number]  SOD Team A      Neva Sam 66 y o  male [id number]    Hospital Stay Days: 16    Assessment/Plan:    Principal Problem:    COPD with exacerbation [facility])  Active Problems:    Colitis    Coronary artery disease involving native coronary artery of native heart    Leukocytosis    Dyslipidemia    Acute kidney failure (HCC)    Alcohol abuse    Tobacco abuse    Alcohol dependence with withdrawal (HCC)    Acute respiratory insufficiency    Agitation    Heart failure, systolic, due to idiopathic cardiomyopathy (HCC)    Presence of ileostomy (HCC)    Moderate protein-calorie malnutrition (HCC)    Anemia    Hypocalcemia    Perforation of colon (HCC)    Acute Hypoxic Respiratory Failure  -Secondary to COPD exacerbation  S/p extubation on 5/23 and transition to NC  Patient was not on oxygen at home prior to admission  He was eventually weaned off NC, but there have been multiple records of patient desaturating to the mid 80s while off oxygen  He will now require O2 therapy at home  -CXR on 5/26 shows consolidation likely due to atelectasis and less likely pneumonia   -Continue respiratory protocol, xoponex and atrovent treatments, symbicort   -Completed 14 days of solumedrol/prednisone  Pulmonology has signed off at this time  Will begin tapering prednisone starting 5/29  Will do 40 x 3mg days, 30mg x 3 days, 20mg x 3 days, 10mg x 3 days until 6/9   -Titrate oxygen to maintain O2 sat over 88%       Perforated Transverse Colon  -Also with history of Crons/Ulcerative colitis with previous right hemicolectomy  -S/p ex-lap on 5/22 with ileocolectomy, diverting ielostomy, fascial closure and 2x SANTA drains placed  Both SANTA drains have been removed as of 5/29   -S/p Zosyn for 5 day course  -Colorectal surgery on board   Cleared patient for regular diet and discharge with wound care   -Pain control with tramadol 100mg po q8h prn      Afib with RVR   -s/p cardioversion on 5/18  Has been in NSR since that time   -Continue amiodarone 200mg po bid until 6/6, then switch to 200mg po daily    -Rate control with metoprolol 25mg po bid   -As per cardiology, patient will not need eliquis unless he develops PAF  -F/u with Dr Payal Shea as an outpatient for an echo       Systolic CHF  -Echo on 7/45/42 shows EF 25% with severe diffuse hypokinesis, consistent with G2DD  Previous echo in 12/2016 showed EF 55% with diffuse hypokinesis  -Continue lopressor 25mg po bid    -s/p one dose IV lasix on 5/26 with minimal improvement in respiratory status  -Monitor I/Os, daily weights  24 hour I/O 480/1375/-895  +3L since admission      NSTEMI type 2  -History of CAD w/ stenting in RCA in 2012   -Continue aspirin 81mg po daily, lipitor 40mg po daily    Alcohol abuse  -Complicated by withdrawal in the ICU, was weaned off precedex     -Continue thiamine 100mg po daily and folate 400mcg po daily  -Has been successfully tapered off valium      Hyperglycemia  -A1c on 5/24/18 was 6 3   -Continue sliding scale insulin and BS checks   -Will not need to be on insulin at home, as he is pre-diabietic  His blood sugars have also been elevated secondary to prednisone, which will eventually be tapered off by 6/9      HTN  -BPs have been stable      Hypocalcemia  -Resolved  -Ionized calcium improved to 1 09  S/p several calcium gluconate infusinons  Shock, poa  -Resolved  -Secondary to sepsis vs  Hypovolemia, requiring resuscitation with levophed and massive fluids      DEBBY, poa  -Resolved  -Likely secondary to volume depletion      Thrombocytopenia  -Resolved  -Received 2 units platelets in the OR      DEBBY, poa  -Resolved  -Likely secondary to volume depletion      Thrombocytopenia  -Resolved  -Received 2 units platelets in the OR  Disposition: Awaiting CM assistance with obtaining home O2 therapy  Medically cleared for discharge with home VNA services    Subjective:    This morning, patient feels "stuffed" after eating well  He is slowly learning how to perform ostomy care  Still endorses abdominal pain, but it has been mildly improved with tramadol  Overnight he was noted to desaturate to 86% on room air  He was temporarily placed on 2L nc and his sats improved  Hospital Course: As per NP Jarad and Dr Claire Munoz a 56 y  o  male who presents on 05/15 from home with complaint of malaise and weakness as well as incontinence of stool/urine due to inability to get off his couch   He has a past medical history significant for COPD, hypertension, coronary artery disease status post PCI to RCA in 2012, ulcerative colitis/Crohn's s/p right hemicolectomy, alcohol abuse, and tobacco abuse   Per the patient he has had several episodes of emesis daily for at least the past month  [facility] denies any increasing diarrhea or nausea, reports shortness of breath is at baseline, denies any fevers, chest pain, any difficulties voiding and reports urinating 3 to 4 times a day, he does endorse decreased appetite eating approximately 2 meals a day, he reports smoking cigars for 3-4 hours daily, drinking at least 1 bottle of kenji a day   In regards to his alcohol abuse he states that he has not stopped drinking for any significant amount of time and is unclear regarding withdrawal symptoms or seizures   In the emergency room he was significantly short of breath and received an extended nebulizer treatment with good effect   He was profoundly hypotensive and was resuscitated with 3 L of crystalloid, and ultimately started on norepinephrine   He had an acute kidney injury, significant metabolic derangements, an elevated troponin, and a lactic acidosis      Patient was admitted to the ICU, and resuscitated with fluids and levophed  His lactic acidosis improved and he was weaned off pressors  His lactic acidosis resolved and the patient was successesfully taken off pressors, maintaining a MAP >65   DEBBY resolved with IV fluid hydration  Antibiotics were continued initially with vancomycin cefepime and flagyl, which were discontinued after the procalcitonin resulted less than 0 5 and blood cultures were negative  The patient also presented with acute encephalopathy secondary to acute alcohol withdrawal and DTs, was started on scheduled Serax with CIWA protocol, encephalopathy resolved and he is currently on scheduled Valium  Thiamine and folate were started  Ammonia was elevated, and lactulose was given for a period of time during ICU stay       This admission, he developed new onset atrial fibrillation with RVR s/p synchronized cardioversion once, s/p Cardizem and digoxin load, and was started on an amiodarone drip  On the day of transfer he has been rate controlled and in sinus rhythm on amiodarone drip  An ECHO was performed which showed a new drop in LVEF to 25% with diffuse hypokinesis with regional variations, new compared to 2016  He had a mild troponin elevation likely secondary to demand ischemia  EKG showed old septal Q waves and ST depressions in inferior leads  With history of inferior MI s/p RCA stent in 2012, aspirin, atorvastatin and metoprolol were continued  He was started on Eliquis for new onset Afib, which was held prior to ex lap  On 5/19 he was intubated due to acute hypoxic respiratory failure secondary to COPD exacerbation with severe bronchospasm  He was extubated on 5/21  He was receiving Xopenex and Atrovent, Pulmicort, and Solumedrol 40 mg every 8 hours, which was decreased to every 12 hours yesterday and daily today  Pulmicort was discontinued today and Symbicort was started  On 5/22 free air was identified under the diaphragm on chest XR in the setting of history of Crohn's disease- colorectal surgery was consulted and he was taken to the OR for an exploratory laparotomy   A perforation was identified in the transverse colon with ischemic bowel, and he had an ileocolectomy, ileocolic anastomosis, and loop ileostomy, and Zosyn every 6 hours was started  He was re-intubated in the OR and extubated on   He improved clinically and was medically cleared for transfer to [facility] on   On the evening of , patient was found down on the floor at beside  Patient denied hitting head or LOC, states that he was trying to find candy off the floor  Later that evening, he was consistently found to be desaturating secondary to pulling off his nasal cannula  A continuous pulse ox was applied at that time  On  he was noted to have increased abdominal pain and leaking around the ostomy site; this was subsequently cared for by the surgical team  He had one SANTA drain removed on  and the second one removed on   He was medically cleared for discharge by surgery, however wound care was consulted for ileostomy care instructions  CM worked with patient and he was approved for home VNA services  On  he was noted to be desaturating to 86% on room air, CM began the process of applying for home O2 services  Vitals: Temp (24hrs), Av 2 °F (36 8 °C), Min:98 °F (36 7 °C), Max:98 4 °F (36 9 °C)  Current: Temperature: 98 3 °F (36 8 °C)  Vitals:    18 0600 18 0700 18 0702 18 0715   BP:   116/74    BP Location:   Right arm    Pulse:   75    Resp:   20    Temp:   98 3 °F (36 8 °C)    TempSrc:   Oral    SpO2:  95%  (!) 86%   Weight: 72 8 kg (160 lb 7 9 oz)      Height:        Body mass index is 25 9 kg/m²  Physical Exam:   General: lying in bed in no acute distress  HEENT: eomi, nc/at  Cardiovascular: reg rate and rhythm  Pulmonary: clear to auscultation, bilaterally  Abdominal: ostomy site with brown stool  No leakage  Extremities: no pitting edema  Skin: warm and dry  Neuro: aox3    Intake and Outputs:  I/O last 24 hours:   In: 480 [P O :480]  Out: 1575 [Urine:975; Stool:600]  Nutrition:        Diet Orders            Start     Ordered    18 5699  Dietary nutrition supplements  Once     Comments:  Vanilla/chocolate   Question Answer Comment   Select Supplement: Ensure Compact-Vanilla    Frequency Breakfast, Lunch, Dinner        05/30/18 1439    05/30/18 1439  Dietary nutrition supplements  Once     Question Answer Comment   Select Supplement: Verl Dark - PRO/90 KCALS    Frequency Breakfast        05/30/18 1439    05/28/18 1158  Diet Regular; Regular House  Diet effective now     Question Answer Comment   Diet Type Regular    Regular Regular House    RD to adjust diet per protocol? Yes        05/28/18 1157        Invasive lines and devices:   Invasive Devices     Peripheral Intravenous Line            Peripheral IV 05/29/18 Right Wrist 1 day          Drain            Colostomy  RUQ 8 days                 Labs:     Results from last 7 days  Lab Units 05/30/18  0546 05/29/18  0532 05/28/18  0616 05/26/18  1201   WBC Thousand/uL 18 46* 15 23* 14 69* 17 22*   HEMOGLOBIN g/dL 10 6* 10 9* 9 9* 12 6   HEMATOCRIT % 33 9* 33 5* 31 5* 39 1   PLATELETS Thousands/uL 211 208 210 216   NEUTROS PCT % 88*  --   --   --    MONOS PCT % 4  --   --   --    MONO PCT MAN %  --  2*  --  10       Results from last 7 days  Lab Units 05/28/18  0616 05/26/18  0602   SODIUM mmol/L 140 140   POTASSIUM mmol/L 4 4 4 4   CHLORIDE mmol/L 103 102   CO2 mmol/L 33* 33*   BUN mg/dL 14 15   CREATININE mg/dL 0 69 0 71   CALCIUM mg/dL 8 2* 7 9*   GLUCOSE RANDOM mg/dL 138 63*       Results from last 7 days  Lab Units 05/26/18  0602   MAGNESIUM mg/dL 2 4     Lab Results   Component Value Date    PHOS 2 9 05/24/2018    PHOS 2 7 05/23/2018    PHOS 2 7 05/22/2018            0  Lab Value Date/Time   TROPONINI 0 12 (H) 05/20/2018 1753   TROPONINI 0 17 (H) 05/20/2018 1505   TROPONINI 0 17 (H) 05/20/2018 1214   TROPONINI 0 29 (H) 05/19/2018 0438   TROPONINI 0 30 (H) 05/19/2018 0154   TROPONINI 0 32 (H) 05/18/2018 2229   TROPONINI 0 32 (H) 05/18/2018 1909   TROPONINI 1 15 (H) 05/16/2018 1416   TROPONINI 1 24 (H) 05/16/2018 0522   TROPONINI 1 16 (H) 05/16/2018 0209   TROPONINI 1 09 (H) 05/16/2018 0053   TROPONINI 0 85 (H) 05/15/2018 1935   TROPONINI 0 72 (H) 05/15/2018 1728   TROPONINI 0 47 (H) 05/15/2018 1304   TROPONINI 0 02 12/07/2016 1931   TROPONINI 0 04 (H) 12/07/2016 0907   TROPONINI <0 04 05/22/2015 0123         ABG:  Lab Results   Component Value Date    PHART 7 443 05/26/2018    QLQ5DCR 46 1 (H) 05/26/2018    PO2ART 72 4 (L) 05/26/2018    ERV7NBL 30 8 (H) 05/26/2018    BEART 5 9 05/26/2018    SOURCE Radial, Left 05/26/2018     Imaging: I have personally reviewed pertinent reports  Ct Chest Abdomen Pelvis Wo Contrast    Result Date: 5/15/2018  Impression: 1  Although exam is limited without intravenous contrast, there appears to be wall thickening centered around the sutures in the hepatic flexure of the colon, with associated inflammatory change and infiltration of the adjacent fat  There may be some involvement of the adjacent descending duodenum  Findings may be related to the history of Crohn's disease/ulcerative colitis  Occult tumor not excluded at this time  Follow-up to resolution recommended  2   Subtle increased ground glass and nodular densities right lung apex may be inflammatory/infectious  CT follow-up in 3-6 months may be considered to ensure resolution  Additional tiny nodular densities bilaterally appear similar  3   Nonobstructing renal calculi  Workstation performed: [id number]     Xr Chest Portable    Result Date: 5/19/2018  Impression: Interval increased interstitial and hazy airspace densities bilaterally  Workstation performed: [id number]     Xr Chest Portable    Result Date: 5/19/2018  Impression: No acute cardiopulmonary disease  Workstation performed: [id number]     Xr Chest Portable    Result Date: 5/19/2018  Impression: No change from 5/17/2018 Workstation performed: [id number]     Xr Chest Portable    Result Date: 5/18/2018  Impression: No acute cardiopulmonary disease  Workstation performed: [id number]     Xr Chest Portable    Result Date: 5/16/2018  Impression: Very small left effusion with bibasilar subsegmental atelectasis Workstation performed: [id number]     Xr Chest Portable    Result Date: 5/15/2018  Impression: No acute cardiopulmonary disease  Findings are stable  Findings are consistent with emergency provider's preliminary reading Workstation performed: [id number]     Xr Chest Portable Icu    Result Date: 5/20/2018  Impression: Improved aeration left lung  Persistent right infrahilar opacity is likely atelectasis  Workstation performed: [id number]     EKG:   Micro:  Lab Results   Component Value Date    BLOODCX No Growth After 5 Days  05/15/2018    BLOODCX No Growth After 5 Days  05/15/2018     Allergies:    Allergies   Allergen Reactions    Other      Brown cloth band aids        Medications:   Scheduled Meds:    Current Facility-Administered Medications:  acetaminophen 650 mg Oral Q6H PRN Roel Finney MD   albuterol 2 puff Inhalation Q4H PRN Army Cutting, DO   albuterol 2 5 mg Nebulization Q4H PRN Army Cutting, DO   amiodarone 200 mg Oral BID With Meals Maverick Jain MD   aspirin 81 mg Oral Daily Yajaira Barrett, DO   atorvastatin 40 mg Oral Daily With Bartolo Childress MD   budesonide-formoterol 2 puff Inhalation BID Sander Thomas MD   folic acid 586 mcg Oral Daily Yajaria Barrett, DO   heparin (porcine) 5,000 Units Subcutaneous Q8H [address] Aura Colon PA-C   insulin lispro 1-6 Units Subcutaneous TID AC Roel Finney MD   insulin lispro 1-6 Units Subcutaneous HS Roel Finney MD   ipratropium 0 5 mg Nebulization TID Army Cutting, DO   labetalol 10 mg Intravenous Q6H PRN Roel Finney MD   levalbuterol 1 25 mg Nebulization TID Army Cutting, DO   metoprolol succinate 50 mg Oral Daily June Dailey MD   nicotine 14 mg Transdermal Daily AMALIA Martin   nystatin  Topical BID June Dailey MD   pantoprazole 40 mg Oral Early Morning Yajaira Barrett,    [START ON 6/7/2018] predniSONE 10 mg Oral Daily Pratibha Fang MD   [START ON 6/4/2018] predniSONE 20 mg Oral Daily Pratibha Fang MD   [START ON 6/1/2018] predniSONE 30 mg Oral Daily Pratibha Fang MD   thiamine 100 mg Oral Daily Tim Houser DO   traMADol 50 mg Oral Q6H PRN Marci Sarabia MD   vitamin A 30,000 Units Oral Daily Ramon Zhao     Continuous Infusions:   PRN Meds:    acetaminophen 650 mg Q6H PRN   albuterol 2 puff Q4H PRN   albuterol 2 5 mg Q4H PRN   labetalol 10 mg Q6H PRN   traMADol 50 mg Q6H PRN       VTE Pharmacologic Prophylaxis: Heparin  VTE Mechanical Prophylaxis: sequential compression device    Pratibha Fang MD

## 2018-05-31 NOTE — PROGRESS NOTES
Progress Note - Colorectal Surgery   Mansi Kwan 66 y o  male MRN: [id number]  Unit/Bed#: [facility] 631-01 Encounter: [id number]    Assessment:  57 yo M with perforated transverse colon 2/2 bowel ischemia s/p ex lap, ileocolectomy with primary anastomosis, diverting ileostomy, fascial closure, SANTA x2 drain placement 5/22    Plan:  - diet as tolerated  - continue vitamin A  - continue midline abdominal packing  - VNA for ostomy care, wound care  - DVT ppx    Subjective/Objective     Subjective: Patient feels well  Wants to go home  Objective:     Vitals: Blood pressure 106/59, pulse 74, temperature 98 °F (36 7 °C), resp  rate 18, height 5' 6" (1 676 m), weight 72 8 kg (160 lb 7 9 oz), SpO2 95 %  ,Body mass index is 25 9 kg/m²  I/O       05/29 0701 - 05/30 0700 05/30 0701 - 05/31 0700    P  O  360 480    Total Intake(mL/kg) 360 (5) 480 (6 6)    Urine (mL/kg/hr) 1150 (0 7) 650 (0 4)    Drains 30 (0)     Stool 1250 (0 7) 600 (0 3)    Total Output 2430 1250    Net -2070 -770                Physical Exam:  GEN: NAD  HEENT: MMM  CV: RRR  Lung: Normal effort  Ab: Soft, NT/ND, ostomy pink, wound C/D/I with serosang drainage  Extrem: No CCE  Neuro:  A+Ox3    Lab, Imaging and other studies: CBC with diff: No results found for: WBC, HGB, HCT, MCV, PLT, ADJUSTEDWBC, MCH, MCHC, RDW, MPV, NRBC, BMP/CMP: No results found for: NA, K, CL, CO2, ANIONGAP, BUN, CREATININE, GLUCOSE, CALCIUM, AST, ALT, ALKPHOS, PROT, ALBUMIN, BILITOT, EGFR, Magnesium: No results found for: MAG  VTE Pharmacologic Prophylaxis: Heparin  VTE Mechanical Prophylaxis: sequential compression device

## 2018-05-31 NOTE — SOCIAL WORK
DC need for Home 02:     Dr Chichi Caruso advised that the patient will need home 02  CM obtained home 02 script and clinical notes; ECIN'd to [facility] DME for anticipated home dc tomorrow 6/1  CM will follow

## 2018-06-01 VITALS
HEART RATE: 75 BPM | BODY MASS INDEX: 25.76 KG/M2 | TEMPERATURE: 98.6 F | DIASTOLIC BLOOD PRESSURE: 68 MMHG | HEIGHT: 66 IN | OXYGEN SATURATION: 96 % | SYSTOLIC BLOOD PRESSURE: 118 MMHG | WEIGHT: 160.27 LBS | RESPIRATION RATE: 18 BRPM

## 2018-06-01 LAB
GLUCOSE SERPL-MCNC: 132 MG/DL (ref 65–140)
GLUCOSE SERPL-MCNC: 191 MG/DL (ref 65–140)

## 2018-06-01 PROCEDURE — 99239 HOSP IP/OBS DSCHRG MGMT >30: CPT | Performed by: INTERNAL MEDICINE

## 2018-06-01 PROCEDURE — 94640 AIRWAY INHALATION TREATMENT: CPT

## 2018-06-01 PROCEDURE — 94760 N-INVAS EAR/PLS OXIMETRY 1: CPT

## 2018-06-01 PROCEDURE — 82948 REAGENT STRIP/BLOOD GLUCOSE: CPT

## 2018-06-01 RX ORDER — AMIODARONE HYDROCHLORIDE 200 MG/1
200 TABLET ORAL DAILY
Qty: 30 TABLET | Refills: 0 | Status: SHIPPED | OUTPATIENT
Start: 2018-06-06 | End: 2018-07-31 | Stop reason: SDUPTHER

## 2018-06-01 RX ORDER — ATORVASTATIN CALCIUM 40 MG/1
40 TABLET, FILM COATED ORAL
Qty: 30 TABLET | Refills: 0 | Status: ON HOLD | OUTPATIENT
Start: 2018-06-01 | End: 2018-07-17

## 2018-06-01 RX ORDER — PREDNISONE 10 MG/1
TABLET ORAL
Qty: 15 TABLET | Refills: 0 | Status: SHIPPED | OUTPATIENT
Start: 2018-06-01 | End: 2018-06-09

## 2018-06-01 RX ORDER — TRAMADOL HYDROCHLORIDE 50 MG/1
100 TABLET ORAL EVERY 12 HOURS
Qty: 56 TABLET | Refills: 0 | Status: SHIPPED | OUTPATIENT
Start: 2018-06-01 | End: 2018-06-01

## 2018-06-01 RX ORDER — ASPIRIN 81 MG/1
81 TABLET, CHEWABLE ORAL DAILY
Qty: 30 TABLET | Refills: 0 | Status: SHIPPED | OUTPATIENT
Start: 2018-06-01 | End: 2020-07-23 | Stop reason: SDUPTHER

## 2018-06-01 RX ORDER — TRAMADOL HYDROCHLORIDE 50 MG/1
100 TABLET ORAL EVERY 12 HOURS
Qty: 40 TABLET | Refills: 0 | Status: SHIPPED | OUTPATIENT
Start: 2018-06-01 | End: 2018-06-01 | Stop reason: HOSPADM

## 2018-06-01 RX ORDER — SENNOSIDES 8.6 MG
650 CAPSULE ORAL EVERY 8 HOURS PRN
Qty: 90 TABLET | Refills: 0 | Status: SHIPPED | OUTPATIENT
Start: 2018-06-01 | End: 2018-07-31 | Stop reason: HOSPADM

## 2018-06-01 RX ORDER — METOPROLOL SUCCINATE 50 MG/1
50 TABLET, EXTENDED RELEASE ORAL DAILY
Qty: 30 TABLET | Refills: 0 | Status: ON HOLD | OUTPATIENT
Start: 2018-06-01 | End: 2018-08-07 | Stop reason: ALTCHOICE

## 2018-06-01 RX ORDER — GABAPENTIN 400 MG/1
400 CAPSULE ORAL 3 TIMES DAILY
Qty: 90 CAPSULE | Refills: 0 | Status: SHIPPED | OUTPATIENT
Start: 2018-06-01 | End: 2018-09-10 | Stop reason: SDUPTHER

## 2018-06-01 RX ORDER — ALBUTEROL SULFATE 2.5 MG/3ML
2.5 SOLUTION RESPIRATORY (INHALATION) EVERY 4 HOURS PRN
Qty: 150 ML | Refills: 0 | Status: SHIPPED | OUTPATIENT
Start: 2018-06-01 | End: 2019-05-17 | Stop reason: SDUPTHER

## 2018-06-01 RX ORDER — LISINOPRIL 2.5 MG/1
2.5 TABLET ORAL DAILY
Qty: 30 TABLET | Refills: 0 | Status: SHIPPED | OUTPATIENT
Start: 2018-06-01 | End: 2018-12-20 | Stop reason: ALTCHOICE

## 2018-06-01 RX ORDER — AMIODARONE HYDROCHLORIDE 200 MG/1
200 TABLET ORAL 2 TIMES DAILY WITH MEALS
Qty: 8 TABLET | Refills: 0 | Status: SHIPPED | OUTPATIENT
Start: 2018-06-02 | End: 2018-06-13

## 2018-06-01 RX ADMIN — BUDESONIDE AND FORMOTEROL FUMARATE DIHYDRATE 2 PUFF: 160; 4.5 AEROSOL RESPIRATORY (INHALATION) at 08:37

## 2018-06-01 RX ADMIN — Medication 100 MG: at 08:40

## 2018-06-01 RX ADMIN — TRAMADOL HYDROCHLORIDE 100 MG: 50 TABLET, FILM COATED ORAL at 13:19

## 2018-06-01 RX ADMIN — HEPARIN SODIUM 5000 UNITS: 5000 INJECTION, SOLUTION INTRAVENOUS; SUBCUTANEOUS at 05:28

## 2018-06-01 RX ADMIN — ASPIRIN 81 MG 81 MG: 81 TABLET ORAL at 08:41

## 2018-06-01 RX ADMIN — NICOTINE 14 MG: 14 PATCH, EXTENDED RELEASE TRANSDERMAL at 08:39

## 2018-06-01 RX ADMIN — ACETAMINOPHEN 650 MG: 325 TABLET, FILM COATED ORAL at 12:17

## 2018-06-01 RX ADMIN — Medication 400 MCG: at 08:40

## 2018-06-01 RX ADMIN — Medication 30000 UNITS: at 08:40

## 2018-06-01 RX ADMIN — TRAMADOL HYDROCHLORIDE 100 MG: 50 TABLET, FILM COATED ORAL at 05:30

## 2018-06-01 RX ADMIN — NYSTATIN: 100000 POWDER TOPICAL at 08:45

## 2018-06-01 RX ADMIN — HEPARIN SODIUM 5000 UNITS: 5000 INJECTION, SOLUTION INTRAVENOUS; SUBCUTANEOUS at 13:20

## 2018-06-01 RX ADMIN — INSULIN LISPRO 2 UNITS: 100 INJECTION, SOLUTION INTRAVENOUS; SUBCUTANEOUS at 08:38

## 2018-06-01 RX ADMIN — PREDNISONE 30 MG: 20 TABLET ORAL at 08:40

## 2018-06-01 RX ADMIN — AMIODARONE HYDROCHLORIDE 200 MG: 200 TABLET ORAL at 08:41

## 2018-06-01 RX ADMIN — METOPROLOL SUCCINATE 50 MG: 50 TABLET, EXTENDED RELEASE ORAL at 08:40

## 2018-06-01 RX ADMIN — ALBUTEROL SULFATE 2 PUFF: 90 AEROSOL, METERED RESPIRATORY (INHALATION) at 07:44

## 2018-06-01 RX ADMIN — PANTOPRAZOLE SODIUM 40 MG: 40 TABLET, DELAYED RELEASE ORAL at 05:28

## 2018-06-01 NOTE — RESTORATIVE TECHNICIAN NOTE
Restorative Specialist Mobility Note       Activity: Other (Comment) (Educated/encouraged pt to ambulate with assistance 3-4 x's/day, pt refused nursing aware  Chair alarm on   Pt callbell, phone/tray within reach  )     Harvey GALAN, Restorative Technician,

## 2018-06-01 NOTE — DISCHARGE INSTRUCTIONS
COPD (Chronic Obstructive Pulmonary Disease)   WHAT YOU NEED TO KNOW:   Chronic obstructive pulmonary disease (COPD) is a lung disease that makes it hard for you to breathe  It is usually a result of lung damage caused by years of irritation and inflammation in your lungs  DISCHARGE INSTRUCTIONS:   Call 911 if:   · You feel lightheaded, short of breath, and have chest pain  Seek care immediately if:   · You are confused, dizzy, or feel faint  · Your arm or leg feels warm, tender, and painful  It may look swollen and red  · You cough up blood  Contact your healthcare provider if:   · You have more shortness of breath than usual      · You need more medicine than usual to control your symptoms  · You are coughing or wheezing more than usual      · You are coughing up more mucus, or it is a different color or has a different odor  · You gain more than 3 pounds in a week  · You have a fever, a runny or stuffy nose, and a sore throat, or other cold or flu symptoms  · Your skin, lips, or nails start to turn blue  · You have swelling in your legs or ankles  · You are very tired or weak for more than a day  · You notice changes in your mood, or changes in your ability to think or concentrate  · You have questions or concerns about your condition or care  Medicines:   · Medicines  may be used to open your airways, decrease swelling and inflammation in your lungs, or treat an infection  You may need 2 or more medicines  A short-acting medicine relieves symptoms quickly  Long-acting medicines will control or prevent symptoms  Ask for more information about the medicines you are given and how to use them safely  · Take your medicine as directed  Contact your healthcare provider if you think your medicine is not helping or if you have side effects  Tell him or her if you are allergic to any medicine  Keep a list of the medicines, vitamins, and herbs you take   Include the amounts, and when and why you take them  Bring the list or the pill bottles to follow-up visits  Carry your medicine list with you in case of an emergency  Help make breathing easier:   · Use pursed-lip breathing any time you feel short of breath  Take a deep breath in through your nose  Slowly breathe out through your mouth with your lips pursed for twice as long as you inhaled  You can also practice this breathing pattern while you bend, lift, climb stairs, or exercise  It slows down your breathing and helps move more air in and out of your lungs  · Do not smoke, and avoid others who smoke  Nicotine and other substances can cause lung irritation or damage and make it harder for you to breathe  Do not use e-cigarettes or smokeless tobacco  They still contain nicotine  Ask your healthcare provider for information if you currently smoke and need help to quit  For support and more information:  ¨ [employer]  Phone: 6- 845 - 399-5885  Web Address: [employer]      · Be aware of and avoid anything that makes your symptoms worse  Stay out of high altitudes and places with high humidity  Stay inside, or cover your mouth and nose with a scarf when you are outside during cold weather  Stay inside on days when air pollution or pollen counts are high  Do not use aerosol sprays such as deodorant, bug spray, and hair spray  Manage COPD and help prevent exacerbations:  COPD is a serious condition that gets worse over time  A COPD exacerbation means your symptoms suddenly get worse  It is important to prevent exacerbations  An exacerbation can cause more lung damage  COPD cannot be cured, but you can take action to feel better and prevent COPD exacerbations:  · Protect yourself from germs  Germs can get into your lungs and cause an infection  An infection in your lungs can create more mucus and make it harder to breathe   An infection can also create swelling in your airways and prevent air from getting in  You can decrease your risk for infection by doing the following:     [facility] your hands often with soap and water  Carry germ-killing gel with you  You can use the gel to clean your hands when soap and water are not available  ¨ Do not touch your eyes, nose, or mouth unless you have washed your hands first      ¨ Always cover your mouth when you cough  Cough into a tissue or your shirtsleeve so you do not spread germs from your hands  ¨ Try to avoid people who have a cold or the flu  If you are sick, stay away from others as much as possible  · Drink more liquids  This will help to keep your air passages moist and help you cough up mucus  Ask how much liquid to drink each day and which liquids are best for you  · Exercise daily  Exercise for at least 20 minutes each day to help increase your energy and decrease shortness of breath  Walking or riding a bike are good ways to exercise  Talk to your healthcare provider about the best exercise plan for you  · Ask about vaccines  Your healthcare provider may recommend that you get regular flu and pneumonia vaccines  Pneumonia can become life-threatening for a person who has COPD  Ask about other vaccines you may need  Ask your healthcare provider about the flu and pneumonia vaccines  All adults should get the flu (influenza) vaccine every year as soon as it becomes available  The pneumonia vaccine is given to adults aged 72 or older to prevent pneumococcal disease, such as pneumonia  Adults aged 23 to 59 years who are at high risk for pneumococcal disease also should get the pneumococcal vaccine  It may need to be repeated 1 or 5 years later  Pulmonary rehabilitation:  Your healthcare provider may recommend a program to help you manage your symptoms and improve your quality of life  It may include nutritional counseling and exercise to strengthen your lungs     Make decisions about your choices for future treatment:  Ask for information about advanced medical directives and living deal  These documents help you decide and write down your choices for treatment and end-of-life care  It is best to complete them when you feel well and can think clearly about your wishes  The information can then be kept for future use if you are in the hospital or become very ill  Follow up with your healthcare provider as directed: You may need more tests  Your healthcare provider may refer you to a pulmonary (lung) specialist  Write down your questions so you remember to ask them during your visits  © 2017 [address] [employer]  Information is for End User's use only and may not be sold, redistributed or otherwise used for commercial purposes  All illustrations and images included in CareNotes® are the copyrighted property of A [employer] M [employer]  or Amrit Vallejo  The above information is an  only  It is not intended as medical advice for individual conditions or treatments  Talk to your doctor, nurse or pharmacist before following any medical regimen to see if it is safe and effective for you    Wash midline wound daily with soap and water, rinse, loosely pack upper midline wound with [place] packing, dry 4x4 dressing, tape

## 2018-06-01 NOTE — SOCIAL WORK
DC Planning:     Dr Chayito Meadows advised that the patient will dc home this afternoon  CM advised [facility]  Daniel Aparicio with [facility] advised that the patient has his portable 02 tank in his room for dc home; concentrator delivery will be tonight  CM received multiple scripts for dc home; pt requesting cost check and fill at [address]; scripts tubed to [facility]  CM will follow  Patient asked about getting scripts for Albuterol inhaler & nebulizer, Dulera inhaler and a Spiriva inhaler  CM advised Dr Chayito Meadows and CM received scripts for those meds; additional scripts sent to [facility]  Patient reported that his GrandSon will arrive around 3:30pm to transport him home  CANDICE Fernández advised of the above

## 2018-06-01 NOTE — SOCIAL WORK
Patient identified as HRR per criteria  Call made to DC appointment hotline with information as required for CM support follow up  Referral made to out CM hotline

## 2018-06-01 NOTE — PROGRESS NOTES
Progress Note - Colorectal  Laurent Osgood 66 y o  male MRN: [id number]  Unit/Bed#: [facility] 631-01 Encounter: [id number]    Assessment:  66y o -year-old male with transverse colon perforation s/p ex lap, EDITH, repair serosal tear, ileocolonic resection w/ primary anastomosis, diverting ileostomy, wound VAC placement 5/22 s/p repair of abdominal wound 5/25    Plan:  1  Transverse colon perforation   - cont diet   - OK for eliquis   - local wound care to abdomen   - vitamin A and prednisone per SLIM    2  DVT Prophylaxis   - OK to restart Eliquis   - SCDs    3  Disposition   - med surg, d/c today w/ VNA since refusing rehab    Rakan Gonzalez MD PGY-4  5:43 AM  06/01/18      Subjective:  Pain controlled  Tolerated regular diet  Objective:  Patient Vitals for the past 24 hrs:   BP Temp Temp src Pulse Resp SpO2 Weight   05/31/18 2300 118/74 98 6 °F (37 °C) - 74 20 96 % -   05/31/18 0830 - - - - - 91 % -   05/31/18 0715 - - - - - (!) 86 % -   05/31/18 0702 116/74 98 3 °F (36 8 °C) Oral 75 20 - -   05/31/18 0700 - - - - - 95 % -   05/31/18 0600 - - - - - - 72 8 kg (160 lb 7 9 oz)          Diet Orders            Start     Ordered    05/30/18 1439  Dietary nutrition supplements  Once     Comments:  Vanilla/chocolate   Question Answer Comment   Select Supplement: Ensure Compact-Vanilla    Frequency Breakfast, Lunch, Dinner        05/30/18 1439    05/30/18 1439  Dietary nutrition supplements  Once     Question Answer Comment   Select Supplement: Steele Sails - PRO/90 KCALS    Frequency Breakfast        05/30/18 1439    05/28/18 1158  Diet Regular; Regular House  Diet effective now     Question Answer Comment   Diet Type Regular    Regular Regular House    RD to adjust diet per protocol?  Yes        05/28/18 1157        Intake/Output Summary (Last 24 hours) at 06/01/18 0543  Last data filed at 06/01/18 0501   Gross per 24 hour   Intake             1440 ml   Output             2525 ml   Net            -1085 ml UOP 1 5  Stool 850 ileostomy     Physical Exam:  General: NAD  Cardiovascular: RRR  Respiratory: breath sounds b/l  Abdomen: soft, ND, minimal tenderness, incision w/ copious dried serosanguinous drainage, stoma pink  Extremities: no edema    Medications:    Current Facility-Administered Medications:  acetaminophen 650 mg Oral Q6H PRN Kishan Alford MD   albuterol 2 puff Inhalation Q4H PRN Maurice Gin, DO   albuterol 2 5 mg Nebulization Q6H PRN Maurice Gin, DO   amiodarone 200 mg Oral BID With Meals Verena Bennett MD   aspirin 81 mg Oral Daily Romayne Button, DO   atorvastatin 40 mg Oral Daily With Kenji Hankins MD   budesonide-formoterol 2 puff Inhalation BID Janeth Villagran MD   folic acid 818 mcg Oral Daily Romayne Button, DO   heparin (porcine) 5,000 Units Subcutaneous Q8H [address] Aura Colon PA-C   insulin lispro 1-6 Units Subcutaneous TID AC Kishan Alford MD   insulin lispro 1-6 Units Subcutaneous HS Kishan Alford MD   labetalol 10 mg Intravenous Q6H PRN Kishan Alford MD   metoprolol succinate 50 mg Oral Daily Alva Jean Baptiste MD   nicotine 14 mg Transdermal Daily AMALIA Cisse   nystatin  Topical BID Alva Jean Baptiste MD   pantoprazole 40 mg Oral Early Morning Romayne Button, DO   [START ON 6/7/2018] predniSONE 10 mg Oral Daily Kishan Alford MD   [START ON 6/4/2018] predniSONE 20 mg Oral Daily Kishan Alford MD   predniSONE 30 mg Oral Daily Kishan Alford MD   thiamine 100 mg Oral Daily Romayne Button, DO   traMADol 100 mg Oral Q8H PRN Alva Jean Baptiste MD   vitamin A 30,000 Units Oral Daily Jacqui Fudge      acetaminophen 650 mg Q6H PRN   albuterol 2 puff Q4H PRN   albuterol 2 5 mg Q6H PRN   labetalol 10 mg Q6H PRN   traMADol 100 mg Q8H PRN     Laboratory results:   CBC: No results found for: WBC, HGB, HCT, MCV, PLT, ADJUSTEDWBC, MCH, MCHC, RDW, MPV, NRBC, CMP: No results found for: NA, K, CL, CO2, ANIONGAP, BUN, CREATININE, GLUCOSE, CALCIUM, AST, ALT, ALKPHOS, PROT, ALBUMIN, BILITOT, EGFR, Coagulation: No results found for: PT, INR, APTT, Urinalysis: No results found for: COLORU, CLARITYU, SPECGRAV, PHUR, LEUKOCYTESUR, NITRITE, PROTEINUA, GLUCOSEU, KETONESU, BILIRUBINUR, BLOODU, Amylase: No results found for: AMYLASE, Lipase: No results found for: LIPASE    VTE Pharmacologic Prophylaxis: Heparin  VTE Mechanical Prophylaxis: sequential compression device

## 2018-06-01 NOTE — PROGRESS NOTES
IM Residency Progress Note   Unit/Bed#: [facility] 631-01 Encounter: [id number]  SOD Team A      Nicol  66 y o  male [id number]    Hospital Stay Days: 17      Assessment/Plan:    Principal Problem:    COPD with exacerbation [facility])  Active Problems:    Colitis    Coronary artery disease involving native coronary artery of native heart    Leukocytosis    Dyslipidemia    Acute kidney failure (HCC)    Alcohol abuse    Tobacco abuse    Alcohol dependence with withdrawal (HCC)    Acute respiratory insufficiency    Agitation    Heart failure, systolic, due to idiopathic cardiomyopathy (HCC)    Presence of ileostomy ([facility])    Moderate protein-calorie malnutrition (HCC)    Anemia    Hypocalcemia    Perforation of colon ([facility] [address] )    1  Acute hypoxic respiratory failure  · Secondary to COPD exacerbation  · Patient was eventually weaned off nasal cannula but however patient noted to be desaturating yesterday to the mid 80s while off oxygen  · Currently patient is on room air and saturating well  · But will require chronic oxygen therapy at home upon discharge  · Continue respiratory protocol, Xopenex and Atrovent treatments, Symbicort  · Pulmonology evaluated the patient has since signed off  Recommended prednisone taper that began on May 29th and will conclude on June 9th  · Continue to maintain O2 saturations greater than 88%    2  Perforated transverse colon  · Patient with history of Crohn's/ulcerative colitis with previous right hemicolectomy  · Colorectal on board has cleared patient for regular diet discharge wound care  · Continue pain control with tramadol 100 mg p o  Q 8 H p r n     3   Atrial fibrillation with RVR  · Resolved patient status post cardioversion on May 18th    · Continue amiodarone 200 mg p o  b i d  until June 6, then switch to 200 mg p o  daily  · Continue rate control metoprolol 50 mg daily  · As per Cardiology patient will not need Eliquis unless he develops the PAF  · Will need follow-up with Dr Kenneth Arango as outpatient for repeat echo    4  Systolic congestive heart failure  · Echo on 2018 showed EF 25% with severe diffuse hypokinesis and grade 2 diastolic dysfunction  · Will need follow-up echo to check for resolution as may be secondary to a stress myopathy/tachycardia mediated cardiomyopathy    5  Coronary artery disease  · History of CAD with stenting in RCA in   · Continue aspirin 81 mg p o  daily, Lipitor 40 mg p o  daily, metoprolol 50 mg daily    6  Alcohol abuse  · Complicated by withdrawal in ICU   · Has been successfully tapered off Valium  · Continue thiamine 100 mg p o  daily and folate 400 mcg p o  daily    7  Hyperglycemia  · Patient A1c 6 3 on 2018  · Continue sliding scale insulin and blood sugar checks  · Will not require diabetic medications on discharge his blood sugars been elevated secondary to prednisone which will eventually be tapered off on   · Will control diet and exercise on outpatient basis    8  Hypertension  · BP within normal limits  · Continue metoprolol 50 mg daily    9  Macrocytic anemia  · Hemoglobin stable at approximately 10 with MCV of 101  · Most likely secondary to alcohol  · Will check a B12 to rule out B12 deficiency      Disposition:  Medically cleared for discharge with home VNA  Awaiting case management assistance with obtaining home O2 therapy      Subjective:   Patient seen examined  No acute overnight events  Still complains of some intermittent abdominal pain that is improved  Also complains of some intermittent wheezing  Denies any nausea, vomiting, diarrhea, constipation, fevers, chills, chest pain, shortness of breath  He required dose of p r n  tramadol 100 mg at 6:00 p m  and 5:30 a m         Vitals: Temp (24hrs), Av 5 °F (36 9 °C), Min:98 3 °F (36 8 °C), Max:98 6 °F (37 °C)  Current: Temperature: 98 6 °F (37 °C)  Vitals:    18 0702 18 0715 18 0830 18 2300   BP: 116/74   118/74   BP Location: Right arm      Pulse: 75   74   Resp: 20   20   Temp: 98 3 °F (36 8 °C)   98 6 °F (37 °C)   TempSrc: Oral      SpO2:  (!) 86% 91% 96%   Weight:       Height:        Body mass index is 25 9 kg/m²  I/O last 24 hours: In: 1440 [P O :1440]  Out: 8971 [Urine:1600; Stool:875]      Physical Exam:   Physical Exam   Constitutional: He is oriented to person, place, and time  He appears well-developed and well-nourished  No distress  HENT:   Head: Normocephalic and atraumatic  Nose: Nose normal    Mouth/Throat: No oropharyngeal exudate  Eyes: Conjunctivae and EOM are normal  Pupils are equal, round, and reactive to light  No scleral icterus  Neck: Normal range of motion  No JVD present  Cardiovascular: Normal rate, regular rhythm, normal heart sounds and intact distal pulses  Exam reveals no gallop and no friction rub  No murmur heard  Pulmonary/Chest: Effort normal  No respiratory distress  He has wheezes  He has no rales  He exhibits no tenderness  Mild diffuse end-expiratory wheezing noted   Abdominal: Soft  Bowel sounds are normal  He exhibits no distension  There is no tenderness  There is no guarding  Ostomy site with brown stool with no leakage or signs of infection   Musculoskeletal: Normal range of motion  He exhibits no edema, tenderness or deformity  Lymphadenopathy:     He has no cervical adenopathy  Neurological: He is alert and oriented to person, place, and time  He displays normal reflexes  No cranial nerve deficit  Coordination normal    Skin: Skin is warm and dry  He is not diaphoretic  No erythema  Psychiatric: He has a normal mood and affect         Invasive Devices     Peripheral Intravenous Line            Peripheral IV 05/29/18 Right Wrist 2 days          Drain            Colostomy  RUQ 9 days                          Labs:   Recent Results (from the past 24 hour(s))   Fingerstick Glucose (POCT)    Collection Time: 05/31/18  8:15 AM   Result Value Ref Range    POC Glucose 134 65 - 140 mg/dl   Fingerstick Glucose (POCT)    Collection Time: 05/31/18 11:47 AM   Result Value Ref Range    POC Glucose 131 65 - 140 mg/dl   Fingerstick Glucose (POCT)    Collection Time: 05/31/18  5:18 PM   Result Value Ref Range    POC Glucose 156 (H) 65 - 140 mg/dl   Fingerstick Glucose (POCT)    Collection Time: 05/31/18  8:57 PM   Result Value Ref Range    POC Glucose 295 (H) 65 - 140 mg/dl       Radiology Results: I have personally reviewed pertinent reports  Other Diagnostic Testing:   I have personally reviewed pertinent reports          Active Meds:   Current Facility-Administered Medications   Medication Dose Route Frequency    acetaminophen (TYLENOL) tablet 650 mg  650 mg Oral Q6H PRN    albuterol (PROVENTIL HFA,VENTOLIN HFA) inhaler 2 puff  2 puff Inhalation Q4H PRN    albuterol inhalation solution 2 5 mg  2 5 mg Nebulization Q6H PRN    amiodarone tablet 200 mg  200 mg Oral BID With Meals    aspirin chewable tablet 81 mg  81 mg Oral Daily    atorvastatin (LIPITOR) tablet 40 mg  40 mg Oral Daily With Dinner    budesonide-formoterol (SYMBICORT) 160-4 5 mcg/act inhaler 2 puff  2 puff Inhalation BID    folic acid (FOLVITE) tablet 400 mcg  400 mcg Oral Daily    heparin (porcine) subcutaneous injection 5,000 Units  5,000 Units Subcutaneous Q8H [facility]    insulin lispro (HumaLOG) 100 units/mL subcutaneous injection 1-6 Units  1-6 Units Subcutaneous TID AC    insulin lispro (HumaLOG) 100 units/mL subcutaneous injection 1-6 Units  1-6 Units Subcutaneous HS    labetalol (NORMODYNE) injection 10 mg  10 mg Intravenous Q6H PRN    metoprolol succinate (TOPROL-XL) 24 hr tablet 50 mg  50 mg Oral Daily    nicotine (NICODERM CQ) 14 mg/24hr TD 24 hr patch 14 mg  14 mg Transdermal Daily    nystatin (MYCOSTATIN) powder   Topical BID    pantoprazole (PROTONIX) EC tablet 40 mg  40 mg Oral Early Morning    [START ON 6/7/2018] predniSONE tablet 10 mg  10 mg Oral Daily    [START ON 6/4/2018] predniSONE tablet 20 mg  20 mg Oral Daily    predniSONE tablet 30 mg  30 mg Oral Daily    thiamine (VITAMIN B1) tablet 100 mg  100 mg Oral Daily    traMADol (ULTRAM) tablet 100 mg  100 mg Oral Q8H PRN    vitamin A capsule 30,000 Units  30,000 Units Oral Daily         VTE Pharmacologic Prophylaxis: Heparin  VTE Mechanical Prophylaxis: sequential compression device    Jose Fletcher MD

## 2018-06-02 DIAGNOSIS — I10 ESSENTIAL HYPERTENSION: ICD-10-CM

## 2018-06-02 RX ORDER — LISINOPRIL 2.5 MG/1
2.5 TABLET ORAL DAILY
Qty: 90 TABLET | Refills: 3 | Status: SHIPPED | OUTPATIENT
Start: 2018-06-02 | End: 2018-06-28 | Stop reason: HOSPADM

## 2018-06-04 ENCOUNTER — TRANSITIONAL CARE MANAGEMENT (OUTPATIENT)
Dept: INTERNAL MEDICINE CLINIC | Facility: CLINIC | Age: 58
End: 2018-06-04

## 2018-06-04 ENCOUNTER — TELEPHONE (OUTPATIENT)
Dept: INTERNAL MEDICINE CLINIC | Facility: CLINIC | Age: 58
End: 2018-06-04

## 2018-06-04 NOTE — TELEPHONE ENCOUNTER
PT  [facility]  HE IS ASKING IF YOU CAN CALL IN A BLOOD GLUCOSE MONITOR AND A NEBULIZER MASK  HE IS NOT DIABETIC THAT I CAN SEE BUT HE SAID THEY WERE CHECKING IT IN THE HOSPITAL AND GIVING HIM INSULIN WHILE HE WAS THERE  HE WOULD LIKE TO KEEP CHECKING IT  HE ALSO HAS A NEBULIZER MACHINE BUT NEEDS A MASK  HE DOESN'T HAVE A SPECIFIC PCP AND I SEE YOU SENT HIS OTHER MEDS OVER MOST LIKELY DURING DISCHARGE

## 2018-06-08 DIAGNOSIS — J45.909 UNCOMPLICATED ASTHMA, UNSPECIFIED ASTHMA SEVERITY, UNSPECIFIED WHETHER PERSISTENT: Primary | ICD-10-CM

## 2018-06-08 DIAGNOSIS — F17.200 NICOTINE DEPENDENCE: ICD-10-CM

## 2018-06-08 DIAGNOSIS — R73.09 ELEVATED GLUCOSE: ICD-10-CM

## 2018-06-08 RX ORDER — NEBULIZER ACCESSORIES
EACH MISCELLANEOUS 2 TIMES DAILY PRN
Qty: 1 EACH | Refills: 0 | Status: SHIPPED | OUTPATIENT
Start: 2018-06-08

## 2018-06-08 NOTE — CASE MANAGEMENT
Notification of Discharge  This is a Notification of Discharge from our facility [address]  Please be advised that this patient has been discharge from our facility  Below you will find the admission and discharge date and time including the patients disposition  PRESENTATION DATE: 5/15/2018 12:31 PM  IP ADMISSION DATE: 5/15/18 1643  DISCHARGE DATE: 6/1/2018  4:55 PM  DISPOSITION: Home with [address] [place] [address] in the [place] by Amrit Vallejo for 2017  Network Utilization Review Department  Phone: [phone]; Fax [phone]  ATTENTION: The Network Utilization Review Department is now centralized for our 7 Facilities  Make a note that we have a new phone and fax numbers for our Department  Please call with any questions or concerns to [phone] and carefully follow the prompts so that you are directed to the right person  All voicemails are confidential  Fax any determinations, approvals, denials, and requests for initial or continue stay review clinical to [phone]  Due to HIGH CALL volume, it would be easier if you could please send faxed requests to expedite your requests and in part, help us provide discharge notifications faster

## 2018-06-13 ENCOUNTER — APPOINTMENT (EMERGENCY)
Dept: RADIOLOGY | Facility: HOSPITAL | Age: 58
DRG: 720 | End: 2018-06-13
Payer: COMMERCIAL

## 2018-06-13 ENCOUNTER — HOSPITAL ENCOUNTER (INPATIENT)
Facility: HOSPITAL | Age: 58
LOS: 15 days | Discharge: HOME HEALTH | DRG: 720 | End: 2018-06-28
Attending: EMERGENCY MEDICINE | Admitting: INTERNAL MEDICINE
Payer: COMMERCIAL

## 2018-06-13 DIAGNOSIS — D72.19 EOSINOPHILIC LEUKOCYTOSIS: ICD-10-CM

## 2018-06-13 DIAGNOSIS — D72.829 LEUKOCYTOSIS, UNSPECIFIED TYPE: ICD-10-CM

## 2018-06-13 DIAGNOSIS — Z93.2 ILEOSTOMY IN PLACE (HCC): ICD-10-CM

## 2018-06-13 DIAGNOSIS — E86.0 DEHYDRATION: ICD-10-CM

## 2018-06-13 DIAGNOSIS — Z93.2 PRESENCE OF ILEOSTOMY (HCC): ICD-10-CM

## 2018-06-13 DIAGNOSIS — K63.1 PERFORATION OF COLON (HCC): ICD-10-CM

## 2018-06-13 DIAGNOSIS — N17.9 ACUTE RENAL FAILURE, UNSPECIFIED ACUTE RENAL FAILURE TYPE (HCC): Primary | ICD-10-CM

## 2018-06-13 DIAGNOSIS — I95.9 HYPOTENSION: ICD-10-CM

## 2018-06-13 LAB
ALBUMIN SERPL BCP-MCNC: 2 G/DL (ref 3.5–5)
ALP SERPL-CCNC: 124 U/L (ref 46–116)
ALT SERPL W P-5'-P-CCNC: 18 U/L (ref 12–78)
ANION GAP SERPL CALCULATED.3IONS-SCNC: 10 MMOL/L (ref 4–13)
ANION GAP SERPL CALCULATED.3IONS-SCNC: 10 MMOL/L (ref 4–13)
ANISOCYTOSIS BLD QL SMEAR: PRESENT
AST SERPL W P-5'-P-CCNC: 26 U/L (ref 5–45)
ATRIAL RATE: 88 BPM
BACTERIA UR QL AUTO: ABNORMAL /HPF
BASOPHILS # BLD AUTO: 0.02 THOUSANDS/ΜL (ref 0–0.1)
BASOPHILS # BLD MANUAL: 0.2 THOUSAND/UL (ref 0–0.1)
BASOPHILS NFR BLD AUTO: 0 % (ref 0–1)
BASOPHILS NFR MAR MANUAL: 1 % (ref 0–1)
BILIRUB SERPL-MCNC: 0.49 MG/DL (ref 0.2–1)
BILIRUB UR QL STRIP: ABNORMAL
BUN SERPL-MCNC: 37 MG/DL (ref 5–25)
BUN SERPL-MCNC: 45 MG/DL (ref 5–25)
CALCIUM SERPL-MCNC: 7.4 MG/DL (ref 8.3–10.1)
CALCIUM SERPL-MCNC: 8.5 MG/DL (ref 8.3–10.1)
CHLORIDE SERPL-SCNC: 106 MMOL/L (ref 100–108)
CHLORIDE SERPL-SCNC: 97 MMOL/L (ref 100–108)
CLARITY UR: ABNORMAL
CO2 SERPL-SCNC: 22 MMOL/L (ref 21–32)
CO2 SERPL-SCNC: 26 MMOL/L (ref 21–32)
COLOR UR: YELLOW
COLOR, POC: ABNORMAL
CREAT SERPL-MCNC: 3.37 MG/DL (ref 0.6–1.3)
CREAT SERPL-MCNC: 4.53 MG/DL (ref 0.6–1.3)
EOSINOPHIL # BLD AUTO: 0.11 THOUSAND/ΜL (ref 0–0.61)
EOSINOPHIL # BLD MANUAL: 0 THOUSAND/UL (ref 0–0.4)
EOSINOPHIL NFR BLD AUTO: 1 % (ref 0–6)
EOSINOPHIL NFR BLD MANUAL: 0 % (ref 0–6)
ERYTHROCYTE [DISTWIDTH] IN BLOOD BY AUTOMATED COUNT: 16.1 % (ref 11.6–15.1)
ERYTHROCYTE [DISTWIDTH] IN BLOOD BY AUTOMATED COUNT: 16.1 % (ref 11.6–15.1)
ETHANOL SERPL-MCNC: <3 MG/DL (ref 0–3)
GFR SERPL CREATININE-BSD FRML MDRD: 13 ML/MIN/1.73SQ M
GFR SERPL CREATININE-BSD FRML MDRD: 19 ML/MIN/1.73SQ M
GLUCOSE SERPL-MCNC: 65 MG/DL (ref 65–140)
GLUCOSE SERPL-MCNC: 93 MG/DL (ref 65–140)
GLUCOSE UR STRIP-MCNC: NEGATIVE MG/DL
HCT VFR BLD AUTO: 30.4 % (ref 36.5–49.3)
HCT VFR BLD AUTO: 32.5 % (ref 36.5–49.3)
HGB BLD-MCNC: 10.1 G/DL (ref 12–17)
HGB BLD-MCNC: 9.4 G/DL (ref 12–17)
HGB UR QL STRIP.AUTO: NEGATIVE
IMM GRANULOCYTES # BLD AUTO: 0.38 THOUSAND/UL (ref 0–0.2)
IMM GRANULOCYTES NFR BLD AUTO: 2 % (ref 0–2)
KETONES UR STRIP-MCNC: NEGATIVE MG/DL
LACTATE SERPL-SCNC: 1.3 MMOL/L (ref 0.5–2)
LEUKOCYTE ESTERASE UR QL STRIP: ABNORMAL
LIPASE SERPL-CCNC: 278 U/L (ref 73–393)
LYMPHOCYTES # BLD AUTO: 0.59 THOUSAND/UL (ref 0.6–4.47)
LYMPHOCYTES # BLD AUTO: 1.78 THOUSANDS/ΜL (ref 0.6–4.47)
LYMPHOCYTES # BLD AUTO: 3 % (ref 14–44)
LYMPHOCYTES NFR BLD AUTO: 11 % (ref 14–44)
MACROCYTES BLD QL AUTO: PRESENT
MCH RBC QN AUTO: 31.1 PG (ref 26.8–34.3)
MCH RBC QN AUTO: 31.6 PG (ref 26.8–34.3)
MCHC RBC AUTO-ENTMCNC: 30.9 G/DL (ref 31.4–37.4)
MCHC RBC AUTO-ENTMCNC: 31.1 G/DL (ref 31.4–37.4)
MCV RBC AUTO: 101 FL (ref 82–98)
MCV RBC AUTO: 102 FL (ref 82–98)
METAMYELOCYTES NFR BLD MANUAL: 1 % (ref 0–1)
MONOCYTES # BLD AUTO: 0.59 THOUSAND/UL (ref 0–1.22)
MONOCYTES # BLD AUTO: 0.96 THOUSAND/ΜL (ref 0.17–1.22)
MONOCYTES NFR BLD AUTO: 6 % (ref 4–12)
MONOCYTES NFR BLD: 3 % (ref 4–12)
NEUTROPHILS # BLD AUTO: 13.7 THOUSANDS/ΜL (ref 1.85–7.62)
NEUTROPHILS # BLD MANUAL: 18 THOUSAND/UL (ref 1.85–7.62)
NEUTS BAND NFR BLD MANUAL: 4 % (ref 0–8)
NEUTS SEG NFR BLD AUTO: 80 % (ref 43–75)
NEUTS SEG NFR BLD AUTO: 88 % (ref 43–75)
NITRITE UR QL STRIP: NEGATIVE
NON-SQ EPI CELLS URNS QL MICRO: ABNORMAL /HPF
NRBC BLD AUTO-RTO: 0 /100 WBCS
NRBC BLD AUTO-RTO: 0 /100 WBCS
OTHER STN SPEC: ABNORMAL
P AXIS: 78 DEGREES
PH UR STRIP.AUTO: 5.5 [PH] (ref 4.5–8)
PLATELET # BLD AUTO: 336 THOUSANDS/UL (ref 149–390)
PLATELET # BLD AUTO: 399 THOUSANDS/UL (ref 149–390)
PLATELET BLD QL SMEAR: ADEQUATE
PMV BLD AUTO: 10 FL (ref 8.9–12.7)
PMV BLD AUTO: 9.8 FL (ref 8.9–12.7)
POLYCHROMASIA BLD QL SMEAR: PRESENT
POTASSIUM SERPL-SCNC: 4.7 MMOL/L (ref 3.5–5.3)
POTASSIUM SERPL-SCNC: 5.7 MMOL/L (ref 3.5–5.3)
PR INTERVAL: 124 MS
PROCALCITONIN SERPL-MCNC: 0.8 NG/ML
PROT SERPL-MCNC: 6.8 G/DL (ref 6.4–8.2)
PROT UR STRIP-MCNC: ABNORMAL MG/DL
QRS AXIS: 124 DEGREES
QRSD INTERVAL: 102 MS
QT INTERVAL: 362 MS
QTC INTERVAL: 438 MS
RBC # BLD AUTO: 3.02 MILLION/UL (ref 3.88–5.62)
RBC # BLD AUTO: 3.2 MILLION/UL (ref 3.88–5.62)
RBC #/AREA URNS AUTO: ABNORMAL /HPF
RBC MORPH BLD: PRESENT
SODIUM SERPL-SCNC: 133 MMOL/L (ref 136–145)
SODIUM SERPL-SCNC: 138 MMOL/L (ref 136–145)
SP GR UR STRIP.AUTO: 1.02 (ref 1–1.03)
T WAVE AXIS: 89 DEGREES
TROPONIN I SERPL-MCNC: <0.02 NG/ML
UROBILINOGEN UR QL STRIP.AUTO: 0.2 E.U./DL
VENTRICULAR RATE: 88 BPM
WBC # BLD AUTO: 16.95 THOUSAND/UL (ref 4.31–10.16)
WBC # BLD AUTO: 19.56 THOUSAND/UL (ref 4.31–10.16)
WBC #/AREA URNS AUTO: ABNORMAL /HPF

## 2018-06-13 PROCEDURE — 85007 BL SMEAR W/DIFF WBC COUNT: CPT | Performed by: EMERGENCY MEDICINE

## 2018-06-13 PROCEDURE — 71046 X-RAY EXAM CHEST 2 VIEWS: CPT

## 2018-06-13 PROCEDURE — 83605 ASSAY OF LACTIC ACID: CPT | Performed by: EMERGENCY MEDICINE

## 2018-06-13 PROCEDURE — 81001 URINALYSIS AUTO W/SCOPE: CPT

## 2018-06-13 PROCEDURE — 36415 COLL VENOUS BLD VENIPUNCTURE: CPT | Performed by: EMERGENCY MEDICINE

## 2018-06-13 PROCEDURE — 93005 ELECTROCARDIOGRAM TRACING: CPT

## 2018-06-13 PROCEDURE — 84300 ASSAY OF URINE SODIUM: CPT | Performed by: NURSE PRACTITIONER

## 2018-06-13 PROCEDURE — 96360 HYDRATION IV INFUSION INIT: CPT

## 2018-06-13 PROCEDURE — 82533 TOTAL CORTISOL: CPT | Performed by: NURSE PRACTITIONER

## 2018-06-13 PROCEDURE — 85025 COMPLETE CBC W/AUTO DIFF WBC: CPT | Performed by: EMERGENCY MEDICINE

## 2018-06-13 PROCEDURE — 84145 PROCALCITONIN (PCT): CPT | Performed by: EMERGENCY MEDICINE

## 2018-06-13 PROCEDURE — 87086 URINE CULTURE/COLONY COUNT: CPT

## 2018-06-13 PROCEDURE — 80320 DRUG SCREEN QUANTALCOHOLS: CPT | Performed by: INTERNAL MEDICINE

## 2018-06-13 PROCEDURE — 80048 BASIC METABOLIC PNL TOTAL CA: CPT | Performed by: EMERGENCY MEDICINE

## 2018-06-13 PROCEDURE — 85027 COMPLETE CBC AUTOMATED: CPT | Performed by: EMERGENCY MEDICINE

## 2018-06-13 PROCEDURE — 83690 ASSAY OF LIPASE: CPT | Performed by: EMERGENCY MEDICINE

## 2018-06-13 PROCEDURE — 96361 HYDRATE IV INFUSION ADD-ON: CPT

## 2018-06-13 PROCEDURE — 81002 URINALYSIS NONAUTO W/O SCOPE: CPT | Performed by: EMERGENCY MEDICINE

## 2018-06-13 PROCEDURE — 99291 CRITICAL CARE FIRST HOUR: CPT | Performed by: ANESTHESIOLOGY

## 2018-06-13 PROCEDURE — 84484 ASSAY OF TROPONIN QUANT: CPT | Performed by: EMERGENCY MEDICINE

## 2018-06-13 PROCEDURE — 93010 ELECTROCARDIOGRAM REPORT: CPT | Performed by: INTERNAL MEDICINE

## 2018-06-13 PROCEDURE — 82570 ASSAY OF URINE CREATININE: CPT | Performed by: NURSE PRACTITIONER

## 2018-06-13 PROCEDURE — 80053 COMPREHEN METABOLIC PANEL: CPT | Performed by: EMERGENCY MEDICINE

## 2018-06-13 PROCEDURE — 87040 BLOOD CULTURE FOR BACTERIA: CPT | Performed by: EMERGENCY MEDICINE

## 2018-06-13 PROCEDURE — 74176 CT ABD & PELVIS W/O CONTRAST: CPT

## 2018-06-13 RX ORDER — PHENOBARBITAL SODIUM 65 MG/ML
130 INJECTION INTRAMUSCULAR ONCE
Status: COMPLETED | OUTPATIENT
Start: 2018-06-13 | End: 2018-06-13

## 2018-06-13 RX ORDER — VANCOMYCIN HYDROCHLORIDE 1 G/200ML
15 INJECTION, SOLUTION INTRAVENOUS ONCE
Status: DISCONTINUED | OUTPATIENT
Start: 2018-06-13 | End: 2018-06-13

## 2018-06-13 RX ADMIN — PHENOBARBITAL SODIUM 130 MG: 65 INJECTION INTRAMUSCULAR; INTRAVENOUS at 23:52

## 2018-06-13 RX ADMIN — SODIUM CHLORIDE 2000 ML: 0.9 INJECTION, SOLUTION INTRAVENOUS at 15:12

## 2018-06-13 RX ADMIN — HYDROCORTISONE SODIUM SUCCINATE 100 MG: 100 INJECTION, POWDER, FOR SOLUTION INTRAMUSCULAR; INTRAVENOUS at 23:10

## 2018-06-13 RX ADMIN — SODIUM CHLORIDE 1000 ML: 0.9 INJECTION, SOLUTION INTRAVENOUS at 20:17

## 2018-06-13 RX ADMIN — SODIUM CHLORIDE 1000 ML: 0.9 INJECTION, SOLUTION INTRAVENOUS at 18:18

## 2018-06-13 NOTE — ED PROVIDER NOTES
History  Chief Complaint   Patient presents with    Hypotension     Patient comes from home where per EMS visiting nurse came to see patient and checked BP and got reading of 50/30 three times  Per EMS patient was 60/40s  Patient denies symptoms  Denies CP or SOB  Patient GCS is 15  Patient states he was recentely admitted to the ICU but is unsure why      This is a 72-year-old male referred to the emergency department by his visiting nurse for hypotension of 60/30  When EMS arrived they checked his blood pressure and reported being 60/40  He was started on IV fluids prior to arrival to the hospital   He denies any symptoms of hypotension  Of note he was discharged 2 weeks ago after a prolonged admission were he developed a perforated transverse colon which resulted in Ileocolectomy, ileocolic anastomosis, and loop ileostomy  He notes continued pain in his left lower quadrant that has been present since the day of surgery and has not improved but also has not worsened  He denies any change in his ostomy output and says that he changes it 2 to 3 times a day  It is non bloody  He denies any nausea and vomiting  He particularly notes that he is hungry during exam and is asking for food  The remainder of his review of systems is negative  Prior to Admission Medications   Prescriptions Last Dose Informant Patient Reported? Taking?    Mometasone Furo-Formoterol Fum (DULERA) 200-5 MCG/ACT AERO   No No   Sig: Inhale 2 puffs every 12 (twelve) hours   Nebulizers (AERONEB GO NEBULIZER HANDSET) MISC   No No   Sig: by Does not apply route 2 (two) times a day as needed (wheezing)   Tiotropium Bromide Monohydrate (SPIRIVA RESPIMAT) 2 5 MCG/ACT AERS   No No   Sig: Inhale 1 Act (2 5 mcg total) daily   VENTOLIN  (90 Base) MCG/ACT inhaler   No No   Sig: Inhale 2 puffs every 4 (four) hours as needed for wheezing or shortness of breath   acetaminophen (TYLENOL) 650 mg CR tablet   No No   Sig: Take 1 tablet (650 mg total) by mouth every 8 (eight) hours as needed for mild pain   albuterol (2 5 mg/3 mL) 0 083 % nebulizer solution   No No   Sig: Take 3 mL (2 5 mg total) by nebulization every 4 (four) hours as needed for wheezing as needed for wheezing   amiodarone 200 mg tablet   No No   Sig: Take 1 tablet (200 mg total) by mouth 2 (two) times a day with meals for 4 days   amiodarone 200 mg tablet   No No   Sig: Take 1 tablet (200 mg total) by mouth daily   aspirin 81 mg chewable tablet   No No   Sig: Chew 1 tablet (81 mg total) daily   atorvastatin (LIPITOR) 40 mg tablet   No No   Sig: Take 1 tablet (40 mg total) by mouth daily with dinner for 30 days   gabapentin (NEURONTIN) 400 mg capsule   No No   Sig: Take 1 capsule (400 mg total) by mouth 3 (three) times a day   glucose blood test strip   No No   Sig: Use as instructed   lisinopril (ZESTRIL) 2 5 mg tablet   No No   Sig: Take 1 tablet (2 5 mg total) by mouth daily   lisinopril (ZESTRIL) 2 5 mg tablet   No No   Sig: Take 1 tablet (2 5 mg total) by mouth daily   metoprolol succinate (TOPROL-XL) 50 mg 24 hr tablet   No No   Sig: Take 1 tablet (50 mg total) by mouth daily   nicotine (NICODERM CQ) 7 mg/24hr TD 24 hr patch   No No   Sig: Place 1 patch on the skin every 24 hours   vitamin A 10,000 units capsule   No No   Sig: Take 3 capsules (30,000 Units total) by mouth daily      Facility-Administered Medications: None       Past Medical History:   Diagnosis Date    Asthma     Cardiac disease     Colitis     Colon polyps     COPD (chronic obstructive pulmonary disease) (HCC)     Coronary artery disease     Diverticulitis     Hyperlipidemia     Hypertension     Myocardial infarction (HCC)     Perforation of colon (HCC)     Ulcerative colitis ([facility] [address] )        Past Surgical History:   Procedure Laterality Date    ANGIOPLASTY      COLON SURGERY      COLONOSCOPY N/A 10/24/2016    Procedure: COLONOSCOPY;  Surgeon: Violet Tran MD;  Location: [facility];   Service:  CORONARY ANGIOPLASTY WITH STENT PLACEMENT      ESOPHAGOGASTRODUODENOSCOPY N/A 10/24/2016    Procedure: ESOPHAGOGASTRODUODENOSCOPY (EGD); Surgeon: Xiomara Carter MD;  Location: [facility]; Service:     EXPLORATORY LAPAROTOMY W/ BOWEL RESECTION N/A 5/22/2018    Procedure: EXPLORATORY LAPAROTOMY, ILIOCOLECTOMY, ILIOCOLONIC ANASTAMOSIS, LOOP ILIOSTOMY, REPAIR OF SEROSAL TEAR, EXTENSIVE LYSIS OF ADHESIONS, WOUND VAC PLACEMENT;  Surgeon: Xiomara Carter MD;  [facility];  Service: Colorectal    HI COLONOSCOPY FLX DX W/COLLJ SPEC WHEN PFRMD N/A 2/6/2018    Procedure: COLONOSCOPY;  Surgeon: Xiomara Carter MD;  [facility]; Service: Colorectal    TONSILLECTOMY         History reviewed  No pertinent family history  I have reviewed and agree with the history as documented  Social History   Substance Use Topics    Smoking status: Current Every Day Smoker     Types: Cigars    Smokeless tobacco: Never Used    Alcohol use Yes      Comment: kenji occassionally        Review of Systems   Constitutional: Negative for chills and fever  HENT: Negative for congestion and sore throat  Eyes: Negative for visual disturbance  Respiratory: Negative for cough and shortness of breath  Cardiovascular: Negative for chest pain and palpitations  Gastrointestinal: Positive for abdominal pain  Negative for diarrhea, nausea and vomiting  Genitourinary: Negative for difficulty urinating and dysuria  Musculoskeletal: Negative for myalgias  Skin: Negative for rash  Neurological: Negative for weakness, light-headedness, numbness and headaches         Physical Exam  ED Triage Vitals   Temperature Pulse Respirations Blood Pressure SpO2   06/13/18 1505 06/13/18 1507 06/13/18 1507 06/13/18 1507 06/13/18 1507   99 6 °F (37 6 °C) 89 20 (!) 66/41 94 %      Temp Source Heart Rate Source Patient Position - Orthostatic VS BP Location FiO2 (%)   06/13/18 1505 06/13/18 1507 06/13/18 1507 06/13/18 1507 --   Oral Monitor Sitting Left arm       Pain Score       06/13/18 1505       No Pain           Orthostatic Vital Signs  Vitals:    06/13/18 1530 06/13/18 1600 06/13/18 1615 06/13/18 1630   BP: (!) 85/51 (!) 77/47 (!) 84/50 (!) 85/52   Pulse: 78 85 87 88   Patient Position - Orthostatic VS: Lying Lying Lying Lying       Physical Exam   Constitutional: He is oriented to person, place, and time  He appears well-developed and well-nourished  No distress  HENT:   Head: Normocephalic and atraumatic  Oropharynx appears to be dry  Eyes: EOM are normal  Pupils are equal, round, and reactive to light  Neck: Normal range of motion  Neck supple  Cardiovascular: Normal rate, regular rhythm, normal heart sounds and intact distal pulses  Pulmonary/Chest: Effort normal and breath sounds normal    Abdominal: Soft  Bowel sounds are normal  He exhibits no distension  There is tenderness (LLQ, mild)  There is no rebound and no guarding  Ileostomy in the right lower quadrant of the abdomen is clean dry and intact  As dark green watery output without signs of blood or melena  Musculoskeletal: Normal range of motion  He exhibits no edema  Neurological: He is alert and oriented to person, place, and time  No cranial nerve deficit  Skin: Skin is warm and dry  Capillary refill takes more than 3 seconds  He is not diaphoretic  There is pallor  Nursing note and vitals reviewed        ED Medications  Medications    EMS REPLENISHMENT MED (not administered)   sodium chloride 0 9 % bolus 2,000 mL (0 mL Intravenous Stopped 6/13/18 1603)       Diagnostic Studies  Results Reviewed     Procedure Component Value Units Date/Time    Comprehensive metabolic panel [[id number]]  (Abnormal) Collected:  06/13/18 1512    Lab Status:  Final result Specimen:  Blood from Arm, Right Updated:  06/13/18 1615     Sodium 133 (L) mmol/L      Potassium 5 7 (H) mmol/L      Chloride 97 (L) mmol/L      CO2 26 mmol/L      Anion Gap 10 mmol/L      BUN 45 (H) mg/dL      Creatinine 4 53 (H) mg/dL      Glucose 93 mg/dL      Calcium 8 5 mg/dL      AST 26 U/L      ALT 18 U/L      Alkaline Phosphatase 124 (H) U/L      Total Protein 6 8 g/dL      Albumin 2 0 (L) g/dL      Total Bilirubin 0 49 mg/dL      eGFR 13 ml/min/1 73sq m     Narrative:         National Kidney Disease Education Program recommendations are as follows:  GFR calculation is accurate only with a steady state creatinine  Chronic Kidney disease less than 60 ml/min/1 73 sq  meters  Kidney failure less than 15 ml/min/1 73 sq  meters  Lipase [[id number]]  (Normal) Collected:  06/13/18 1512    Lab Status:  Final result Specimen:  Blood from Arm, Right Updated:  06/13/18 1615     Lipase 278 u/L     Troponin I [[id number]]  (Normal) Collected:  06/13/18 1512    Lab Status:  Final result Specimen:  Blood from Arm, Right Updated:  06/13/18 1606     Troponin I <0 02 ng/mL     Narrative:         [employer] Chemistry analyzer 99% cutoff is > 0 04 ng/mL in network labs    o cTnI 99% cutoff is useful only when applied to patients in the clinical setting of myocardial ischemia  o cTnI 99% cutoff should be interpreted in the context of clinical history, ECG findings and possibly cardiac imaging to establish correct diagnosis  o cTnI 99% cutoff may be suggestive but clearly not indicative of a coronary event without the clinical setting of myocardial ischemia  CBC and differential [[id number]]  (Abnormal) Collected:  06/13/18 1512    Lab Status:  Final result Specimen:  Blood from Arm, Right Updated:  06/13/18 1554     WBC 19 56 (H) Thousand/uL      RBC 3 20 (L) Million/uL      Hemoglobin 10 1 (L) g/dL      Hematocrit 32 5 (L) %       (H) fL      MCH 31 6 pg      MCHC 31 1 (L) g/dL      RDW 16 1 (H) %      MPV 10 0 fL      Platelets 410 (H) Thousands/uL      nRBC 0 /100 WBCs     Narrative: This is an appended report  These results have been appended to a previously verified report      Lactic acid, plasma [[id number]]     Lab Status:  No result Specimen:  Blood                  XR chest portable   Final Result by Jessica 6, DO (06/15 2967)      Trace left pleural effusion  No pneumothorax  Left basal consolidation may represent atelectasis or pneumonia  Workstation performed: [id number]         XR chest 2 views   Final Result by Godwin Merrill MD (06/13 4971)      No acute cardiopulmonary disease  Workstation performed: [id number]         CT abdomen pelvis wo contrast   Final Result by Chinedu Jones MD (06/13 4912)      Left lower lobe consolidation concerning for pneumonia  Status post bowel surgery without evidence of obstruction or abscess        Stable bilateral nonobstructing intrarenal calculi and complex left renal cyst                Workstation performed: [id number]               Procedures  ECG 12 Lead Documentation  Date/Time: 6/13/2018 4:42 PM  Performed by: Jamal Dale  Authorized by: Jamal Dale     ECG reviewed by me, the ED Provider: yes    Patient location:  ED  Previous ECG:     Previous ECG:  Compared to current    Comparison ECG info:  5/23/2018    Similarity:  No change    Comparison to cardiac monitor: Yes    Interpretation:     Interpretation: non-specific    Rate:     ECG rate assessment: normal    Rhythm:     Rhythm: sinus rhythm    Ectopy:     Ectopy: none    QRS:     QRS axis:  Right    QRS intervals:  Normal  Conduction:     Conduction: abnormal      Abnormal conduction: incomplete RBBB    ST segments:     ST segments:  Normal  T waves:     T waves: non-specific          Phone Consults  ED Phone Contact    ED Course  ED Course as of Jun 17 1009   Wed Jun 13, 2018   1620 WBC: (!) 19 56   1620 Stable Hemoglobin: (!) 10 1   1621 Creatinine: (!) 4 53   2007 Discussed the case with CC, they agree that the patient dose not appear to have a source of infection despite elevated WBC and procal, especially given the elevation the patient had on his last labs  They suggest repeat CBC/BMP and SD1 admission if labs improve  MDM  Number of Diagnoses or Management Options  Acute renal failure, unspecified acute renal failure type [facility]):   Dehydration:   Hypotension:   Diagnosis management comments: 58yoM with recent ileocolectomy and ileostomy presenting with hypotension and significant DEBBY  Several liters of IVH given in the ED with transient improvement of his BP  Despite his low BP, he remained A&Ox4, so pressors were not started  He was afebrile with normal HR, RR, and SpO2 and no localizing signs or symptoms of infection on history and physical so abx were not initiated  No intra-abdominal process identified on CT  CC consulted in the department and recommended medicine admission to SD with CC consult      CritCare Time    Disposition  Final diagnoses:   Acute renal failure, unspecified acute renal failure type ([facility] )   Hypotension   Dehydration     Time reflects when diagnosis was documented in both MDM as applicable and the Disposition within this note     Time User Action Codes Description Comment    6/13/2018  7:50 PM Woods Pion Add [N17 9] [facility], [facility]     6/13/2018  7:50 PM Kath, Dahiana Medicus Modify [N17 9] Acute renal failure, unspecified acute renal failure type ([facility] [address] )     6/13/2018  7:50 PM Kath, Lella Caulk [I95 9] Hypotension     6/13/2018  9:06 PM Kath, Lella Caulk [E86 0] Dehydration     6/13/2018 10:46 PM Budd Maizes Add [D72 829] Leukocytosis, unspecified type     6/13/2018 10:46 PM Budd Maizes Modify [D72 829] Leukocytosis, unspecified type     6/13/2018 10:46 PM Budd Maizes Add [K63 1] Perforation of colon ([facility] [address] )     6/13/2018 10:46 PM Budd Maizes Modify [K63 1] Perforation of colon ([facility] )     6/13/2018 10:46 PM Budd Maizes Add [Z93 2] Presence of ileostomy ([facility] )     6/13/2018 10:46 PM Budd Maizes Modify [Z93 2] Presence of ileostomy [facility])       ED Disposition ED Disposition Condition Comment    Admit  Case was discussed with SOD and the patient's admission status was agreed to be Admission Status: inpatient status to the service of Dr Tmiur Curtis   Follow-up Information     Follow up With Specialties Details Why [facility]  Follow up nurse will call to schedule visit [address] [facility]  [phone]            Current Discharge Medication List      START taking these medications    Details   !! atorvastatin (LIPITOR) 20 mg tablet TAKE 1 TABLET DAILY  Qty: 30 tablet, Refills: 11    Associated Diagnoses: Dyslipidemia       !! - Potential duplicate medications found  Please discuss with provider        CONTINUE these medications which have NOT CHANGED    Details   acetaminophen (TYLENOL) 650 mg CR tablet Take 1 tablet (650 mg total) by mouth every 8 (eight) hours as needed for mild pain  Qty: 90 tablet, Refills: 0    Associated Diagnoses: Colitis      albuterol (2 5 mg/3 mL) 0 083 % nebulizer solution Take 3 mL (2 5 mg total) by nebulization every 4 (four) hours as needed for wheezing as needed for wheezing  Qty: 150 mL, Refills: 0    Associated Diagnoses: COPD (chronic obstructive pulmonary disease) ([facility])      amiodarone 200 mg tablet Take 1 tablet (200 mg total) by mouth daily  Qty: 30 tablet, Refills: 0    Associated Diagnoses: Atrial fibrillation with RVR ([facility])      aspirin 81 mg chewable tablet Chew 1 tablet (81 mg total) daily  Qty: 30 tablet, Refills: 0    Associated Diagnoses: Coronary artery disease involving native coronary artery of native heart without angina pectoris      !! atorvastatin (LIPITOR) 40 mg tablet Take 1 tablet (40 mg total) by mouth daily with dinner for 30 days  Qty: 30 tablet, Refills: 0    Associated Diagnoses: Coronary artery disease involving native coronary artery of native heart without angina pectoris      gabapentin (NEURONTIN) 400 mg capsule Take 1 capsule (400 mg total) by mouth 3 (three) times a day  Qty: 90 capsule, Refills: 0    Associated Diagnoses: Cervical radiculopathy      glucose blood test strip Use as instructed  Qty: 100 each, Refills: 0    Associated Diagnoses: Elevated glucose      !! lisinopril (ZESTRIL) 2 5 mg tablet Take 1 tablet (2 5 mg total) by mouth daily  Qty: 30 tablet, Refills: 0    Associated Diagnoses: Essential hypertension      !! lisinopril (ZESTRIL) 2 5 mg tablet Take 1 tablet (2 5 mg total) by mouth daily  Qty: 90 tablet, Refills: 3    Associated Diagnoses: Essential hypertension      metoprolol succinate (TOPROL-XL) 50 mg 24 hr tablet Take 1 tablet (50 mg total) by mouth daily  Qty: 30 tablet, Refills: 0    Associated Diagnoses: Atrial fibrillation with RVR (HCC)      Nebulizers (AERONEB GO NEBULIZER HANDSET) MISC by Does not apply route 2 (two) times a day as needed (wheezing)  Qty: 1 each, Refills: 0    Associated Diagnoses: Uncomplicated asthma, unspecified asthma severity, unspecified whether persistent      nicotine (NICODERM CQ) 7 mg/24hr TD 24 hr patch Place 1 patch on the skin every 24 hours  Qty: 28 patch, Refills: 0    Associated Diagnoses: Nicotine dependence      Tiotropium Bromide Monohydrate (SPIRIVA RESPIMAT) 2 5 MCG/ACT AERS Inhale 1 Act (2 5 mcg total) daily  Qty: 1 Inhaler, Refills: 0    Associated Diagnoses: COPD with exacerbation (HCC)      VENTOLIN  (90 Base) MCG/ACT inhaler Inhale 2 puffs every 4 (four) hours as needed for wheezing or shortness of breath  Qty: 1 Inhaler, Refills: 0    Associated Diagnoses: COPD (chronic obstructive pulmonary disease) (HCC)      vitamin A 10,000 units capsule Take 3 capsules (30,000 Units total) by mouth daily  Qty: 10 capsule, Refills: 0    Associated Diagnoses: Colitis       ! ! - Potential duplicate medications found  Please discuss with provider        STOP taking these medications       Mometasone Furo-Formoterol Fum (DULERA) 200-5 MCG/ACT AERO Comments:   Reason for Stopping:             No discharge procedures on file  ED Provider  Attending physically available and evaluated Mansi Kwan  ARI managed the patient along with the ED Attending      Electronically Signed by         Silvana Anders MD  06/17/18 3745

## 2018-06-13 NOTE — ED RE-EVALUATION NOTE
Bedside echo shows no pericardial effusion, normal EF  RV not dilated, no septal bowing  IVC collapses completely with inspiration  Pt likely hypovolemic  Will continue aggressive IVF resuscitation  Will also obtain CT abd/pelvis, lactic acid  Pt requesting food, informed we need to wait until CT completed in case he requires surgery       Alexia Castillo, DO  06/13/18 8923

## 2018-06-13 NOTE — Clinical Note
Case was discussed with SOD and the patient's admission status was agreed to be Admission Status: inpatient status to the service of Dr Erika Kamara

## 2018-06-13 NOTE — ED ATTENDING ATTESTATION
Juan Castillo DO, saw and evaluated the patient  I have discussed the patient with the resident/non-physician practitioner and agree with the resident's/non-physician practitioner's findings, Plan of Care, and MDM as documented in the resident's/non-physician practitioner's note, except where noted  All available labs and Radiology studies were reviewed  At this point I agree with the current assessment done in the Emergency Department  I have conducted an independent evaluation of this patient a history and physical is as follows:    70-year-old male presents from home after visiting nurse visited this home noted that he was hypotensive in the 62s  Patient has no complaints at this time states he feels little bit tired but otherwise well  Denies any chest pain, shortness of breath, fevers, chills, nausea, vomiting has mild left-sided abdominal pain which has been present since he was here recently for a perforated transverse colon status post colectomy and colostomy  No other complaints at this time unclear how long this has been ongoing  In the ER patient has a blood pressure of 66/41 is mentating well has warm and well-perfused extremities  Lungs are clear to auscultation bilaterally he is mildly tachycardic with regular rhythm  He is moving all extremities spontaneously  He appears to be volume depleted  Impression:  Hypotension likely due to hypovolemia plan:  Aggressive IV fluid resuscitation  Will check labs and ECG will reassess  Critical Care Time  The patient presented with a condition in which there was a high probability of imminent or life-threatening deterioration, and critical care services (excluding separately billable procedures) totalled  minutes          Procedures

## 2018-06-14 ENCOUNTER — APPOINTMENT (INPATIENT)
Dept: NON INVASIVE DIAGNOSTICS | Facility: HOSPITAL | Age: 58
DRG: 720 | End: 2018-06-14
Payer: COMMERCIAL

## 2018-06-14 PROBLEM — G93.40 ENCEPHALOPATHY: Status: ACTIVE | Noted: 2018-06-14

## 2018-06-14 PROBLEM — E86.0 DEHYDRATION: Status: ACTIVE | Noted: 2018-06-14

## 2018-06-14 PROBLEM — A41.9 SEPSIS (HCC): Status: ACTIVE | Noted: 2018-06-14

## 2018-06-14 LAB
ANION GAP SERPL CALCULATED.3IONS-SCNC: 9 MMOL/L (ref 4–13)
BUN SERPL-MCNC: 30 MG/DL (ref 5–25)
CALCIUM SERPL-MCNC: 7.5 MG/DL (ref 8.3–10.1)
CHLORIDE SERPL-SCNC: 109 MMOL/L (ref 100–108)
CO2 SERPL-SCNC: 21 MMOL/L (ref 21–32)
CORTIS SERPL-MCNC: 23.8 UG/DL
CREAT SERPL-MCNC: 2.23 MG/DL (ref 0.6–1.3)
CREAT UR-MCNC: 186 MG/DL
ERYTHROCYTE [DISTWIDTH] IN BLOOD BY AUTOMATED COUNT: 16.3 % (ref 11.6–15.1)
GFR SERPL CREATININE-BSD FRML MDRD: 32 ML/MIN/1.73SQ M
GLUCOSE SERPL-MCNC: 102 MG/DL (ref 65–140)
HCT VFR BLD AUTO: 31.7 % (ref 36.5–49.3)
HGB BLD-MCNC: 9.5 G/DL (ref 12–17)
MCH RBC QN AUTO: 30.8 PG (ref 26.8–34.3)
MCHC RBC AUTO-ENTMCNC: 30 G/DL (ref 31.4–37.4)
MCV RBC AUTO: 103 FL (ref 82–98)
PLATELET # BLD AUTO: 417 THOUSANDS/UL (ref 149–390)
PMV BLD AUTO: 10 FL (ref 8.9–12.7)
POTASSIUM SERPL-SCNC: 5.3 MMOL/L (ref 3.5–5.3)
PROCALCITONIN SERPL-MCNC: 0.42 NG/ML
RBC # BLD AUTO: 3.08 MILLION/UL (ref 3.88–5.62)
SODIUM 24H UR-SCNC: 46 MOL/L
SODIUM SERPL-SCNC: 139 MMOL/L (ref 136–145)
WBC # BLD AUTO: 22.33 THOUSAND/UL (ref 4.31–10.16)

## 2018-06-14 PROCEDURE — 99291 CRITICAL CARE FIRST HOUR: CPT

## 2018-06-14 PROCEDURE — 93306 TTE W/DOPPLER COMPLETE: CPT

## 2018-06-14 PROCEDURE — 93321 DOPPLER ECHO F-UP/LMTD STD: CPT | Performed by: INTERNAL MEDICINE

## 2018-06-14 PROCEDURE — 93308 TTE F-UP OR LMTD: CPT | Performed by: INTERNAL MEDICINE

## 2018-06-14 PROCEDURE — 87081 CULTURE SCREEN ONLY: CPT | Performed by: PHYSICIAN ASSISTANT

## 2018-06-14 PROCEDURE — 93325 DOPPLER ECHO COLOR FLOW MAPG: CPT | Performed by: INTERNAL MEDICINE

## 2018-06-14 PROCEDURE — 84145 PROCALCITONIN (PCT): CPT | Performed by: NURSE PRACTITIONER

## 2018-06-14 PROCEDURE — 80048 BASIC METABOLIC PNL TOTAL CA: CPT | Performed by: INTERNAL MEDICINE

## 2018-06-14 PROCEDURE — 85027 COMPLETE CBC AUTOMATED: CPT | Performed by: INTERNAL MEDICINE

## 2018-06-14 PROCEDURE — 99233 SBSQ HOSP IP/OBS HIGH 50: CPT | Performed by: EMERGENCY MEDICINE

## 2018-06-14 RX ORDER — ASPIRIN 81 MG/1
81 TABLET, CHEWABLE ORAL DAILY
Status: DISCONTINUED | OUTPATIENT
Start: 2018-06-14 | End: 2018-06-28 | Stop reason: HOSPADM

## 2018-06-14 RX ORDER — ALBUTEROL SULFATE 2.5 MG/3ML
2.5 SOLUTION RESPIRATORY (INHALATION) EVERY 4 HOURS PRN
Status: DISCONTINUED | OUTPATIENT
Start: 2018-06-14 | End: 2018-06-28 | Stop reason: HOSPADM

## 2018-06-14 RX ORDER — OXYCODONE HYDROCHLORIDE 5 MG/1
2.5 TABLET ORAL EVERY 4 HOURS PRN
Status: DISCONTINUED | OUTPATIENT
Start: 2018-06-14 | End: 2018-06-15

## 2018-06-14 RX ORDER — SODIUM CHLORIDE, SODIUM GLUCONATE, SODIUM ACETATE, POTASSIUM CHLORIDE, MAGNESIUM CHLORIDE, SODIUM PHOSPHATE, DIBASIC, AND POTASSIUM PHOSPHATE .53; .5; .37; .037; .03; .012; .00082 G/100ML; G/100ML; G/100ML; G/100ML; G/100ML; G/100ML; G/100ML
1000 INJECTION, SOLUTION INTRAVENOUS ONCE
Status: COMPLETED | OUTPATIENT
Start: 2018-06-14 | End: 2018-06-14

## 2018-06-14 RX ORDER — GABAPENTIN 400 MG/1
400 CAPSULE ORAL 3 TIMES DAILY
Status: DISCONTINUED | OUTPATIENT
Start: 2018-06-14 | End: 2018-06-14

## 2018-06-14 RX ORDER — ACETAMINOPHEN 325 MG/1
650 TABLET ORAL EVERY 6 HOURS PRN
Status: DISCONTINUED | OUTPATIENT
Start: 2018-06-14 | End: 2018-06-28 | Stop reason: HOSPADM

## 2018-06-14 RX ORDER — AMIODARONE HYDROCHLORIDE 200 MG/1
200 TABLET ORAL DAILY
Status: DISCONTINUED | OUTPATIENT
Start: 2018-06-14 | End: 2018-06-28 | Stop reason: HOSPADM

## 2018-06-14 RX ORDER — NICOTINE 21 MG/24HR
1 PATCH, TRANSDERMAL 24 HOURS TRANSDERMAL DAILY
Status: DISCONTINUED | OUTPATIENT
Start: 2018-06-14 | End: 2018-06-28 | Stop reason: HOSPADM

## 2018-06-14 RX ORDER — THIAMINE HYDROCHLORIDE 100 MG/ML
100 INJECTION, SOLUTION INTRAMUSCULAR; INTRAVENOUS DAILY
Status: DISCONTINUED | OUTPATIENT
Start: 2018-06-14 | End: 2018-06-14

## 2018-06-14 RX ORDER — ATORVASTATIN CALCIUM 40 MG/1
40 TABLET, FILM COATED ORAL
Status: DISCONTINUED | OUTPATIENT
Start: 2018-06-14 | End: 2018-06-28 | Stop reason: HOSPADM

## 2018-06-14 RX ORDER — HEPARIN SODIUM 5000 [USP'U]/ML
5000 INJECTION, SOLUTION INTRAVENOUS; SUBCUTANEOUS EVERY 8 HOURS SCHEDULED
Status: DISCONTINUED | OUTPATIENT
Start: 2018-06-14 | End: 2018-06-28 | Stop reason: HOSPADM

## 2018-06-14 RX ORDER — BUDESONIDE AND FORMOTEROL FUMARATE DIHYDRATE 160; 4.5 UG/1; UG/1
2 AEROSOL RESPIRATORY (INHALATION) 2 TIMES DAILY
Status: DISCONTINUED | OUTPATIENT
Start: 2018-06-14 | End: 2018-06-28 | Stop reason: HOSPADM

## 2018-06-14 RX ORDER — SODIUM CHLORIDE, SODIUM GLUCONATE, SODIUM ACETATE, POTASSIUM CHLORIDE, MAGNESIUM CHLORIDE, SODIUM PHOSPHATE, DIBASIC, AND POTASSIUM PHOSPHATE .53; .5; .37; .037; .03; .012; .00082 G/100ML; G/100ML; G/100ML; G/100ML; G/100ML; G/100ML; G/100ML
125 INJECTION, SOLUTION INTRAVENOUS CONTINUOUS
Status: DISCONTINUED | OUTPATIENT
Start: 2018-06-14 | End: 2018-06-15

## 2018-06-14 RX ORDER — OXYCODONE HYDROCHLORIDE 5 MG/1
5 TABLET ORAL EVERY 4 HOURS PRN
Status: DISCONTINUED | OUTPATIENT
Start: 2018-06-14 | End: 2018-06-15

## 2018-06-14 RX ORDER — ALBUTEROL SULFATE 90 UG/1
2 AEROSOL, METERED RESPIRATORY (INHALATION) EVERY 4 HOURS PRN
Status: DISCONTINUED | OUTPATIENT
Start: 2018-06-14 | End: 2018-06-28 | Stop reason: HOSPADM

## 2018-06-14 RX ADMIN — NICOTINE 1 PATCH: 14 PATCH, EXTENDED RELEASE TRANSDERMAL at 08:38

## 2018-06-14 RX ADMIN — HEPARIN SODIUM 5000 UNITS: 5000 INJECTION, SOLUTION INTRAVENOUS; SUBCUTANEOUS at 05:17

## 2018-06-14 RX ADMIN — CEFEPIME HYDROCHLORIDE 2000 MG: 2 INJECTION, POWDER, FOR SOLUTION INTRAVENOUS at 09:05

## 2018-06-14 RX ADMIN — OXYCODONE HYDROCHLORIDE 5 MG: 5 TABLET ORAL at 17:31

## 2018-06-14 RX ADMIN — SODIUM CHLORIDE, SODIUM GLUCONATE, SODIUM ACETATE, POTASSIUM CHLORIDE, MAGNESIUM CHLORIDE, SODIUM PHOSPHATE, DIBASIC, AND POTASSIUM PHOSPHATE 125 ML/HR: .53; .5; .37; .037; .03; .012; .00082 INJECTION, SOLUTION INTRAVENOUS at 08:56

## 2018-06-14 RX ADMIN — THIAMINE HYDROCHLORIDE 100 MG: 100 INJECTION, SOLUTION INTRAMUSCULAR; INTRAVENOUS at 04:16

## 2018-06-14 RX ADMIN — BUDESONIDE AND FORMOTEROL FUMARATE DIHYDRATE 2 PUFF: 160; 4.5 AEROSOL RESPIRATORY (INHALATION) at 17:31

## 2018-06-14 RX ADMIN — FOLIC ACID 1 MG: 5 INJECTION, SOLUTION INTRAMUSCULAR; INTRAVENOUS; SUBCUTANEOUS at 04:16

## 2018-06-14 RX ADMIN — SODIUM CHLORIDE 1000 ML: 0.9 INJECTION, SOLUTION INTRAVENOUS at 02:27

## 2018-06-14 RX ADMIN — SODIUM CHLORIDE, SODIUM GLUCONATE, SODIUM ACETATE, POTASSIUM CHLORIDE, MAGNESIUM CHLORIDE, SODIUM PHOSPHATE, DIBASIC, AND POTASSIUM PHOSPHATE 125 ML/HR: .53; .5; .37; .037; .03; .012; .00082 INJECTION, SOLUTION INTRAVENOUS at 16:47

## 2018-06-14 RX ADMIN — HEPARIN SODIUM 5000 UNITS: 5000 INJECTION, SOLUTION INTRAVENOUS; SUBCUTANEOUS at 13:38

## 2018-06-14 RX ADMIN — SODIUM CHLORIDE, SODIUM GLUCONATE, SODIUM ACETATE, POTASSIUM CHLORIDE, MAGNESIUM CHLORIDE, SODIUM PHOSPHATE, DIBASIC, AND POTASSIUM PHOSPHATE 125 ML/HR: .53; .5; .37; .037; .03; .012; .00082 INJECTION, SOLUTION INTRAVENOUS at 00:45

## 2018-06-14 RX ADMIN — HYDROCORTISONE SODIUM SUCCINATE 25 MG: 100 INJECTION, POWDER, FOR SOLUTION INTRAMUSCULAR; INTRAVENOUS at 21:10

## 2018-06-14 RX ADMIN — BUDESONIDE AND FORMOTEROL FUMARATE DIHYDRATE 2 PUFF: 160; 4.5 AEROSOL RESPIRATORY (INHALATION) at 08:39

## 2018-06-14 RX ADMIN — AMIODARONE HYDROCHLORIDE 200 MG: 200 TABLET ORAL at 08:38

## 2018-06-14 RX ADMIN — CEFEPIME HYDROCHLORIDE 1000 MG: 1 INJECTION, SOLUTION INTRAVENOUS at 21:00

## 2018-06-14 RX ADMIN — TIOTROPIUM BROMIDE 18 MCG: 18 CAPSULE ORAL; RESPIRATORY (INHALATION) at 08:38

## 2018-06-14 RX ADMIN — HYDROCORTISONE SODIUM SUCCINATE 25 MG: 100 INJECTION, POWDER, FOR SOLUTION INTRAMUSCULAR; INTRAVENOUS at 13:37

## 2018-06-14 RX ADMIN — VANCOMYCIN HYDROCHLORIDE 1500 MG: 1 INJECTION, POWDER, LYOPHILIZED, FOR SOLUTION INTRAVENOUS at 09:07

## 2018-06-14 RX ADMIN — ASPIRIN 81 MG 81 MG: 81 TABLET ORAL at 08:38

## 2018-06-14 RX ADMIN — ALBUTEROL SULFATE 2 PUFF: 90 AEROSOL, METERED RESPIRATORY (INHALATION) at 22:43

## 2018-06-14 RX ADMIN — PHENYLEPHRINE HYDROCHLORIDE 35 MCG/MIN: 10 INJECTION INTRAVENOUS at 03:30

## 2018-06-14 RX ADMIN — ATORVASTATIN CALCIUM 40 MG: 40 TABLET, FILM COATED ORAL at 16:52

## 2018-06-14 RX ADMIN — Medication 30000 UNITS: at 08:38

## 2018-06-14 RX ADMIN — HEPARIN SODIUM 5000 UNITS: 5000 INJECTION, SOLUTION INTRAVENOUS; SUBCUTANEOUS at 21:10

## 2018-06-14 RX ADMIN — SODIUM CHLORIDE, SODIUM GLUCONATE, SODIUM ACETATE, POTASSIUM CHLORIDE, MAGNESIUM CHLORIDE, SODIUM PHOSPHATE, DIBASIC, AND POTASSIUM PHOSPHATE 1000 ML: .53; .5; .37; .037; .03; .012; .00082 INJECTION, SOLUTION INTRAVENOUS at 18:19

## 2018-06-14 NOTE — CONSULTS
Consultation - Colorectal Surgery   Rena Diaz 66 y o  male MRN: [id number]  Unit/Bed#: [facility] [place] 515-01 Encounter: [id number]    Assessment and Plan:  64yo male with heart disease and recent ileostomy creation for perforated transverse colon presents with hypotension likely from high ostomy output  ICU level of care for aggressive resuscitation  Strict ins and outs monitoring to understand how much output from ostomy daily  May need to start metamucil/lomotil/imodium to decrease output  Wet-to-dry dressing changes  Rest of care per ICU  Will follow closely with you      History of Present Illness   HPI:  Rena Diaz is a 66 y o  male who presents with hypotension  The patient has a h/o cardiomyopathy (EF 25%), Afib, crohn's s/p prior R hemicolectomy  He recently underwent ex-lap, ileocolonic resection with primary anastomosis with creation of diverting ileostomy for perforated transverse colon and was discharged to home with VNA  He was noted yesterday to be hypotensive to systolic 61I by VNA and was brought to the ED for evaluation  On evaluation in ED he was noted to be severely dehydrated with acute kidney injury  Imaging has only revealed a left lower lobe consolidation with no evidence of acute intraabdominal process  He has since received multiple fluid boluses and a dose of hydrocortisone - he has been started on phenylephrine and admitted to the ICU for continued resuscitation  Colorectal surgery is consulted for management of possible high ileostomy output  Patient is a poor historian  Inpatient consult to Colorectal Surgery     Performed by  Sundar Reilly     Authorized by [employer]              Review of Systems     Constitutional: Positive for fatigue  HENT: Negative  Respiratory: Positive for shortness of breath  Negative for chest tightness  Cardiovascular: Negative for chest pain  Gastrointestinal: Positive for abdominal pain  Endocrine: Negative      Genitourinary: Negative  Musculoskeletal: Negative  Allergic/Immunologic: Negative  Neurological: Negative  Hematological: Negative  Psychiatric/Behavioral: Positive for hallucinations  Historical Information   Past Medical History:   Diagnosis Date    Asthma     Cardiac disease     Colitis     Colon polyps     COPD (chronic obstructive pulmonary disease) ([place] [address] )     Coronary artery disease     Diverticulitis     Hyperlipidemia     Hypertension     Myocardial infarction ([place] [address] )     Perforation of colon ([place] [address] )     Ulcerative colitis ([place] [address] )      Past Surgical History:   Procedure Laterality Date    ANGIOPLASTY      COLON SURGERY      COLONOSCOPY N/A 10/24/2016    Procedure: COLONOSCOPY;  Surgeon: Fuentes Sandy MD;  Location: [facility]; Service:     CORONARY ANGIOPLASTY WITH STENT PLACEMENT      ESOPHAGOGASTRODUODENOSCOPY N/A 10/24/2016    Procedure: ESOPHAGOGASTRODUODENOSCOPY (EGD); Surgeon: Fuentes Sandy MD;  Location[facility]; Service:     EXPLORATORY LAPAROTOMY W/ BOWEL RESECTION N/A 5/22/2018    Procedure: EXPLORATORY LAPAROTOMY, ILIOCOLECTOMY, ILIOCOLONIC ANASTAMOSIS, LOOP ILIOSTOMY, REPAIR OF SEROSAL TEAR, EXTENSIVE LYSIS OF ADHESIONS, WOUND VAC PLACEMENT;  Surgeon: Fuentes Sandy MD;  Location: [facility];  Service: Colorectal    AR COLONOSCOPY FLX DX W/COLLJ SPEC WHEN PFRMD N/A 2/6/2018    Procedure: COLONOSCOPY;  Surgeon: Fuentes Sandy MD;  [facility]: [facility]; Service: Colorectal    TONSILLECTOMY       Social History   History   Alcohol Use    Yes     Comment: kneji occassionally     History   Drug Use No     History   Smoking Status    Current Every Day Smoker    Types: Cigars   Smokeless Tobacco    Never Used     History reviewed  No pertinent family history      Meds/Allergies   current meds:   Current Facility-Administered Medications   Medication Dose Route Frequency     EMS REPLENISHMENT MED   Does not apply Once    albuterol (PROVENTIL HFA,VENTOLIN HFA) inhaler 2 puff  2 puff Inhalation Q4H PRN    albuterol inhalation solution 2 5 mg  2 5 mg Nebulization Q4H PRN    amiodarone tablet 200 mg  200 mg Oral Daily    aspirin chewable tablet 81 mg  81 mg Oral Daily    atorvastatin (LIPITOR) tablet 40 mg  40 mg Oral Daily With Dinner    budesonide-formoterol (SYMBICORT) 160-4 5 mcg/act inhaler 2 puff  2 puff Inhalation BID    heparin (porcine) subcutaneous injection 5,000 Units  5,000 Units Subcutaneous Q8H [address]    multi-electrolyte (ISOLYTE-S PH 7 4 equivalent) IV solution  125 mL/hr Intravenous Continuous    nicotine (NICODERM CQ) 14 mg/24hr TD 24 hr patch 1 patch  1 patch Transdermal Daily    phenylephrine (RUBIN-SYNEPHRINE) 50 mg (STANDARD CONCENTRATION) in sodium chloride 0 9% 250 mL   mcg/min Intravenous Titrated    thiamine (VITAMIN B1) injection 100 mg  100 mg Intramuscular Daily    tiotropium (SPIRIVA) capsule for inhaler 18 mcg  18 mcg Inhalation Daily    vitamin A capsule 30,000 Units  30,000 Units Oral Daily     Allergies   Allergen Reactions    Other      Brown cloth band aids          Objective   First Vitals:   Blood Pressure: (!) 66/41 (06/13/18 1507)  Pulse: 89 (06/13/18 1507)  Temperature: 99 6 °F (37 6 °C) (06/13/18 1505)  Temp Source: Oral (06/13/18 1505)  Respirations: 20 (06/13/18 1507)  Height: 5' 7" (170 2 cm) (06/14/18 0100)  Weight - Scale: 72 6 kg (160 lb) (06/13/18 1505)  SpO2: 94 % (06/13/18 1507)    Current Vitals:   Blood Pressure: 90/58 (06/14/18 0415)  Pulse: 76 (06/14/18 0415)  Temperature: 98 6 °F (37 °C) (06/14/18 0415)  Temp Source: Oral (06/14/18 0100)  Respirations: 19 (06/14/18 0415)  Height: 5' 7" (170 2 cm) (06/14/18 0100)  Weight - Scale: 69 6 kg (153 lb 7 oz) (06/14/18 0100)  SpO2: 96 % (06/14/18 0415)      Intake/Output Summary (Last 24 hours) at 06/14/18 0435  Last data filed at 06/14/18 0400   Gross per 24 hour   Intake          5656 25 ml   Output             1275 ml   Net          4381 25 ml       Invasive Devices     Peripheral Intravenous Line            Peripheral IV 06/13/18 Left Forearm less than 1 day    Peripheral IV 06/13/18 Right Antecubital less than 1 day          Drain            Colostomy  RUQ 22 days    Urethral Catheter Temperature probe 16 Fr  less than 1 day                Physical Exam  Gen: no acute distress  HENT: NC/AT  Neck: Supple, No lympmphadenopathy  CV: RRR, no MRG  Pulm: Lungs CTAB  GI: abdomen soft, minimally tender, ND  Incision with open area with clean base  Extremities: no clubbing or cyanosis   No joint swelling    Lab Results:   CBC:   Lab Results   Component Value Date    WBC 16 95 (H) 06/13/2018    HGB 9 4 (L) 06/13/2018    HCT 30 4 (L) 06/13/2018     (H) 06/13/2018     06/13/2018    MCH 31 1 06/13/2018    MCHC 30 9 (L) 06/13/2018    RDW 16 1 (H) 06/13/2018    MPV 9 8 06/13/2018    NRBC 0 06/13/2018   , CMP:   Lab Results   Component Value Date     06/13/2018    K 4 7 06/13/2018     06/13/2018    CO2 22 06/13/2018    ANIONGAP 10 06/13/2018    BUN 37 (H) 06/13/2018    CREATININE 3 37 (H) 06/13/2018    GLUCOSE 65 06/13/2018    CALCIUM 7 4 (L) 06/13/2018    AST 26 06/13/2018    ALT 18 06/13/2018    ALKPHOS 124 (H) 06/13/2018    PROT 6 8 06/13/2018    BILITOT 0 49 06/13/2018    EGFR 19 06/13/2018   , Coagulation: No results found for: PT, INR, APTT, Urinalysis:   Lab Results   Component Value Date    COLORU Yellow 06/13/2018    CLARITYU Cloudy 06/13/2018    SPECGRAV 1 020 06/13/2018    PHUR 5 5 06/13/2018    LEUKOCYTESUR Trace (A) 06/13/2018    NITRITE Negative 06/13/2018    PROTEINUA 30 (1+) (A) 06/13/2018    GLUCOSEU Negative 06/13/2018    KETONESU Negative 06/13/2018    BILIRUBINUR Interference- unable to analyze (A) 06/13/2018    BLOODU Negative 06/13/2018   , Amylase: No results found for: AMYLASE, Lipase:   Lab Results   Component Value Date    LIPASE 278 06/13/2018     Imaging: I have personally reviewed pertinent films in PACS  EKG, Pathology, and Other Studies: I have personally reviewed pertinent films in PACS    Counseling / Coordination of Care  Total floor / unit time spent today 25 minutes  Greater than 50% of total time was spent with the patient and / or family counseling and / or coordination of care      Brad Cochran MD  6/14/2018 4:35 AM

## 2018-06-14 NOTE — PROGRESS NOTES
Progress Note - Critical Care   Nicol  66 y o  male MRN: [id number]  Unit/Bed#: [facility] 515-01 Encounter: [id number]    Impression:  Principal Problem:    Hypotension  Active Problems:    Encephalopathy    Acute kidney failure (HCC)    Dehydration    Heart failure, systolic, due to idiopathic cardiomyopathy ([facility] [address] )    Alcohol abuse    Tobacco abuse    Ileostomy in place ([facility] [address] )    Sepsis ([facility] )    Plan:    Neuro:   · Monitor for signs of alcohol withdrawal   · Holding gabapentin with DEBBY  · Regulate sleep/wake cycle  · Trend neuro exam  · Daily CAM-ICU, delirium precautions  CV:   · Cardiac infusions: Neosynephrine, 40 mcg/min, septic vs hypovolemic shock  · Wean as able, volume resuscitation as needed  · MAP goal > 65  · Random cortisol within normal limits  · Place a-line and CVC if unable to wean from pressors  · Repeat ECHO today  · Hold home anti-hypertensives  Lung:   · Prn nebulizers  · Pulmonary toileting and IS  · Wean nasal cannula as able  · SpO2 goal >90%  GI:   · General surgery following, appreciate recommendations  Follow ostomy output  · Serial abdominal examinations  FEN:   · Fluid/Diuretic plan: IV fluids at 125cc/hr for resuscitation  · Goal 24 hour fluid balance: Positive  · Nutrition/diet plan: NPO  · Replete electrolytes with goals: K >4 0, Mag >2 0, and Phos >3 0  :   · Indwelling Hernandez present: yes   · Urinary catheter still needed for Strict I and O in a critically ill patient    · Trend UOP and BUN/creat  · Strict I and O  ID:   · Source of infection: likely pneumonia, trend procalcitonin  · Abx ordered: vancomycin and cefepime  · Day # 1 of 7 day course  · Check MRSA swab  · Trend temps and WBC count  · Maintain normothermia  · Follow up cultures  Heme:   · Trend hgb and plts  · Transfuse as needed for goal hgb >7 0  · SQH/SCDs  Endo:   · Glycemic control plan: Blood glucose controlled on current regimen  MSK/Skin:  · Frequent turning and pressure off-loading  · Local wound care as needed  VTE Prophylaxis:  · Pharmacologic Prophylaxis: Heparin  · Mechanical Prophylaxis: sequential compression device    Disposition: Continue ICU care    Treatment Team: Colorectal surgery    Reason for Admission: Hypotension/AMS    HPI/24hr events: Admitted to the ICU last evening secondary to hypotension requiring neosynephrine infusion  Remains on 40mcg/min of neosynephrine  Physical Exam:    General Appearance:  No acute distress, tearful during examination  Appears disheveled  HENT: Normocephalic without obvious abnormality    Eyes: No icterus    Cardiac: Regular rate and regular rhythm, no murmur, no rub    Pulmonary: Coarse to auscultation bilaterally, diffuse wheezing    Gastrointestinal: Soft, no distention  Ileostomy w/ brown, watery output  Non-tender      : Hernandez present: yes             Musculoskeletal: No edema bilaterally  Neuro/Psych:  Alert to self only  States the year is 48, does not remember recent events or why he was admitted to the hospital      Skin: Warm, dry, intact    Vitals:   Vitals:    18 0400 18 0415 18 0500 18 0600   BP: (!) 82/49 90/58 108/68 104/63   BP Location:       Pulse: 80 76 78 72   Resp: 19 19 16 19   Temp:  98 6 °F (37 °C) 98 2 °F (36 8 °C) 97 9 °F (36 6 °C)   TempSrc:       SpO2: 95% 96% 97% 97%   Weight:       Height:           Temperature: Temp (24hrs), Av 7 °F (37 1 °C), Min:97 9 °F (36 6 °C), Max:99 6 °F (37 6 °C)  Current: Temperature: 97 9 °F (36 6 °C)    Weights: IBW: 66 1 kg  Body mass index is 24 03 kg/m²  Respiratory:  SpO2: SpO2: 98 %  O2 Flow Rate (L/min): 3 L/min    Intake and Outputs:  Intake/Output Summary (Last 24 hours) at 18 0635  Last data filed at 18 0600   Gross per 24 hour   Intake           5989 5 ml   Output             1625 ml   Net           4364 5 ml     I/O last 24 hours:   In: 5989 5 [I V :689 5; IV [id number]]  Out: 4108 [Urine:1075; Stool:550]    UOP: 75cc/hour   Colostomy output: 375cc since admission    Nutrition:        Diet Orders            Start     Ordered    18  Diet Cardiac; Cardiac Step 1  Diet effective now     Question Answer Comment   Diet Type Cardiac    Cardiac Cardiac Step 1    RD to adjust diet per protocol? Yes        18        Labs:     Results from last 7 days  Lab Units 18  1512   WBC Thousand/uL 22 33* 16 95* 19 56*   HEMOGLOBIN g/dL 9 5* 9 4* 10 1*   HEMATOCRIT % 31 7* 30 4* 32 5*   PLATELETS Thousands/uL 417* 336 399*   NEUTROS PCT %  --  80*  --    MONOS PCT %  --  6  --    MONO PCT MAN %  --   --  3*       Results from last 7 days  Lab Units 18  1512   SODIUM mmol/L 139 138 133*   POTASSIUM mmol/L 5 3 4 7 5 7*   CHLORIDE mmol/L 109* 106 97*   CO2 mmol/L 21 22 26   BUN mg/dL 30* 37* 45*   CREATININE mg/dL 2 23* 3 37* 4 53*   CALCIUM mg/dL 7 5* 7 4* 8 5   TOTAL PROTEIN g/dL  --   --  6 8   BILIRUBIN TOTAL mg/dL  --   --  0 49   ALK PHOS U/L  --   --  124*   ALT U/L  --   --  18   AST U/L  --   --  26   GLUCOSE RANDOM mg/dL 102 65 93                    Results from last 7 days  Lab Units 18  1711   LACTIC ACID mmol/L 1 3       Results from last 7 days  Lab Units 18  1512   TROPONIN I ng/mL <0 02                   Imagin/14 CXR: No acute cardiopulmonary disease  I have personally reviewed pertinent reports   ECHO: pending    CT abdomen/pelvis: LLL consolidation concerning for pna  Status post bowel surgery without evidence of obstruction or abscess  Stable bilateral nonobstructing intrarenal calculi and complex left renal cyst  I have personally reviewed pertinent reports  Micro:   Blood Culture:   Lab Results   Component Value Date    BLOODCX No Growth After 5 Days  05/15/2018    BLOODCX No Growth After 5 Days   05/15/2018     Urine Culture: No results found for: URINECX  Sputum Culture: No components found for: SPUTUMCX  Wound Culure: No results found for: WOUNDCULT        Allergies: Allergies   Allergen Reactions    Other      Brown cloth band aids          Medications:   Scheduled Meds:  Current Facility-Administered Medications:  EMS replenish medication  Does not apply Once Vasiliy Orozco, DO    albuterol 2 puff Inhalation Q4H PRN Arvel Edu, DO    albuterol 2 5 mg Nebulization Q4H PRN Arvel Edu, DO    amiodarone 200 mg Oral Daily Arvel Edu, DO    aspirin 81 mg Oral Daily Arvel Edu, DO    atorvastatin 40 mg Oral Daily With Martene Shires, DO    budesonide-formoterol 2 puff Inhalation BID Arvel Edu, DO    heparin (porcine) 5,000 Units Subcutaneous Q8H [address] Arvel Edu, DO    multi-electrolyte 125 mL/hr Intravenous Continuous Nicole Caney, CRNP Last Rate: 125 mL/hr (06/14/18 0045)   nicotine 1 patch Transdermal Daily Arvel Edu, DO    phenylephine  mcg/min Intravenous Titrated Nicole Caney, CRNP Last Rate: 40 mcg/min (06/14/18 0520)   thiamine 100 mg Intramuscular Daily Arvel Edu, DO    tiotropium 18 mcg Inhalation Daily Arvel Edu, DO    vitamin A 30,000 Units Oral Daily Arvel Edu, DO      Continuous Infusions:  multi-electrolyte 125 mL/hr Last Rate: 125 mL/hr (06/14/18 0045)   phenylephine  mcg/min Last Rate: 40 mcg/min (06/14/18 0520)     PRN Meds:    albuterol 2 puff Q4H PRN   albuterol 2 5 mg Q4H PRN       Invasive lines and devices: Invasive Devices     Peripheral Intravenous Line            Peripheral IV 06/13/18 Left Forearm less than 1 day    Peripheral IV 06/13/18 Right Antecubital less than 1 day          Drain            Colostomy  RUQ 22 days    Urethral Catheter Temperature probe 16 Fr  less than 1 day                Code Status: Level 1 - Full Code    Counseling / Coordination of Care  Total Critical Care time spent 32 minutes excluding procedures, teaching and family updates        SIGNATURE: Isis Muñiz PA-C  DATE: June 14, 2018  TIME: 6:35 AM

## 2018-06-14 NOTE — PLAN OF CARE
SAFETY,RESTRAINT: NV/NON-SELF DESTRUCTIVE BEHAVIOR     Remains free of harm/injury (restraint for non violent/non self-detsructive behavior) Not Progressing     Returns to optimal restraint-free functioning Not Progressing          Nutrition/Hydration-ADULT     Nutrient/Hydration intake appropriate for improving, restoring or maintaining nutritional needs Progressing        Potential for Falls     Patient will remain free of falls Progressing        Prexisting or High Potential for Compromised Skin Integrity     Skin integrity is maintained or improved Progressing

## 2018-06-14 NOTE — ED RE-EVALUATION NOTE
Spoke with Dr Yoly Fuller from [phone] regarding patient  Patient hypotensive however afebrile and offers no complaints he has been mentating whole time and making jokes despite being hypotensive persistently throughout his ED stay  He has received 4 L of normal saline intravenously with transient increase in blood pressure  His workup thus far his only med remarkable for a leukocytosis which may be due to volume depletion as his platelet count is normally 200 is now 400 today  He also has a creatinine of 4 5 which is up from his baseline of 0 7 he has not urinated since he has been here in the ED  He appears clinically dehydrated, he is not tachycardic however he is on metoprolol  CT scan did reveal a possible left lower told pneumonia however patient clinically does not have pneumonia as he is afebrile, denies cough denies shortness of breath, and denies left-sided chest pain  Patient's lactic acid is 1 3 which would be inconsistent with septic shock  we did check a procalcitonin which is mildly elevated but is around where he was 3 weeks ago while he was in the hospital     I do not suspect the patient has an infectious cause for his hypotension I believe that this is largely due to hypokalemia and continues to require continued IV fluids  He has a normal ejection fraction on bedside echo he does not have a pericardial effusion causing his hypotension  His right ventricle is not dilated  His IVC collapses 100% upon inspiration  Again all of these findings suggest severe dehydration rather than infectious cause of his hypotension  This time after discussion with Dr Farzad Villarreal and we are in agreement with not starting antibiotics or pressors at this time  As patient is mentating well, and is warm and well perfused distally  He recommends admission to Medicine service with Critical Care Resource consultation    Also recommends repeat labs while here in the ER to ensure that creatinine is improving and CBC indices are decreasing       [address] Michelle Rodrigues DO  06/13/18 2016

## 2018-06-14 NOTE — H&P
INTERNAL MEDICINE HISTORY AND PHYSICAL  ED 02 SOD Team B     NAME: Christiana Navarro  AGE: 66 y o  SEX: male  : 1960   MRN: [id number]  ENCOUNTER: [id number]    DATE: 2018  TIME: 9:40 PM    Primary Care Physician: Marie Storey DO  Admitting Provider: Francisco Newsome MD  Chief complaint:  Hypotension    History of Present Illness     Christiana Navarro is a 66 y o  male with past medical history significant for COPD, hypertension, coronary artery disease status post PCI in , status post right hemicolectomy secondary to perforated transverse colon, alcohol abuse, tobacco abuse and ulcerative colitis/Crohn's who presents the emergency department this evening after having been evaluated by [facility] while he was at home and noted to have significant hypotension with a systolic blood pressure in the 60s  The patient at that point had no significant complaints and was asymptomatic  He did affirm some mild fatigue to the EMS staff  At the time of presentation to the emergency department his blood pressure was 66/41 and he was mentating appropriately with warm and well perfused extremities  At that point he appeared severely volume depleted and was started on aggressive IV fluid resuscitation  He was evaluated with a bedside ultrasound which showed collapsibility of his IVC further indicating his severely volume depleted status  At this point was determined that the patient was appropriate for admission to the medicine service with the Critical Care Resource consultation  The case was discussed between the emergency department attending and critical care attending  At the time of my evaluation for admission to the hospital I found the patient in his room sitting in the chair tremulous, diaphoretic and acutely confused  Patient stated that he became disoriented by the wires on his chest and started pulling them out (he was able to pull out his 20 gauge IV but not his 16 gauge IV)    Patient stated that he needed to urinate and that there was not a urinal to use  My ability to obtain an accurate and comprehensive history was limited secondary to the patient being an overall poor historian as well as him being somewhat more confused than previously described  Patient was asking me if I knew where his car was  When I explained him that he was brought to the emergency department via EMS he was able to understand his car was likely at home  Patient was oriented to person and place, but not to time stating that he thought it was 2007 or 2008  I did spend significant amount of time attempting to discussed with the patient his alcohol use  Patient is adamant that he has not consumed any alcohol since he was previously admitted at the beginning of the month  I attempted to discussed with the patient that I was not attempted to be drained mental but that it was of utmost importance that he disclose whether not he had been consuming alcohol on a sleepy as it was imperative to her appropriate treatment of his numerous medical conditions  Review of Systems   Review of Systems   Constitutional: Positive for fatigue  Negative for activity change, appetite change, chills, diaphoresis, fever and unexpected weight change  Respiratory: Negative for cough, choking, chest tightness, shortness of breath, wheezing and stridor  Cardiovascular: Negative for chest pain, palpitations and leg swelling  Gastrointestinal: Negative for abdominal distention, abdominal pain, constipation, diarrhea, nausea and vomiting  Genitourinary: Negative for difficulty urinating, dysuria, enuresis, flank pain, frequency, hematuria and urgency  Musculoskeletal: Negative for arthralgias, back pain, joint swelling, neck pain and neck stiffness  Neurological: Positive for dizziness, tremors, weakness, light-headedness and headaches  Negative for syncope and numbness         Past Medical History     Past Medical History:   Diagnosis Date    Asthma     Cardiac disease     Colitis     Colon polyps     COPD [facility]chronic obstructive [facility]     Coronary artery disease     Diverticulitis     Hyperlipidemia     Hypertension     Myocardial infarction ([facility] )     Perforation of colon ([facility] )     Ulcerative colitis ([place] [address] )        Past Surgical History     Past Surgical History:   Procedure Laterality Date    ANGIOPLASTY      COLON SURGERY      COLONOSCOPY N/A 10/24/2016    Procedure: COLONOSCOPY;  Surgeon: Luis Field MD;  [facility]: [facility]; Service:     CORONARY ANGIOPLASTY WITH STENT PLACEMENT      ESOPHAGOGASTRODUODENOSCOPY N/A 10/24/2016    Procedure: ESOPHAGOGASTRODUODENOSCOPY (EGD); Surgeon: Luis Field MD;  [facility]; Service:     EXPLORATORY LAPAROTOMY W/ BOWEL RESECTION N/A 5/22/2018    Procedure: EXPLORATORY LAPAROTOMY, ILIOCOLECTOMY, ILIOCOLONIC ANASTAMOSIS, LOOP ILIOSTOMY, REPAIR OF SEROSAL TEAR, EXTENSIVE LYSIS OF ADHESIONS, WOUND VAC PLACEMENT;  Surgeon: Luis Field MD;  [facility];  Service: Colorectal    KS COLONOSCOPY FLX DX W/COLLJ SPEC WHEN PFRMD N/A 2/6/2018    Procedure: COLONOSCOPY;  Surgeon: Luis Field MD;  [facility]; Service: Colorectal    TONSILLECTOMY         Social History     History   Alcohol Use    Yes     Comment: kenji occassionally     History   Drug Use No     History   Smoking Status    Current Every Day Smoker    Types: Cigars   Smokeless Tobacco    Never Used       Family History   History reviewed  No pertinent family history  Medications Prior to Admission     Prior to Admission medications    Medication Sig Start Date End Date Taking?  Authorizing Provider   acetaminophen (TYLENOL) 650 mg CR tablet Take 1 tablet (650 mg total) by mouth every 8 (eight) hours as needed for mild pain 6/1/18  Yes Luciano Echevarria MD   albuterol (2 5 mg/3 mL) 0 083 % nebulizer solution Take 3 mL (2 5 mg total) by nebulization every 4 (four) hours as needed for wheezing as needed for wheezing 6/1/18  Yes Aubree Fiore MD   amiodarone 200 mg tablet Take 1 tablet (200 mg total) by mouth daily 6/6/18  Yes Aubree Fiore MD   aspirin 81 mg chewable tablet Chew 1 tablet (81 mg total) daily 6/1/18  Yes Aubree Fiore MD   atorvastatin (LIPITOR) 40 mg tablet Take 1 tablet (40 mg total) by mouth daily with dinner for 30 days 6/1/18 7/1/18 Yes Luciano Echevarria MD   gabapentin (NEURONTIN) 400 mg capsule Take 1 capsule (400 mg total) by mouth 3 (three) times a day 6/1/18  Yes Aubree Fiore MD   glucose blood test strip Use as instructed 6/8/18  Yes Luciano Echevarria MD   lisinopril (ZESTRIL) 2 5 mg tablet Take 1 tablet (2 5 mg total) by mouth daily 6/1/18  Yes Aubree Fiore MD   lisinopril (ZESTRIL) 2 5 mg tablet Take 1 tablet (2 5 mg total) by mouth daily 6/2/18  Yes Parag De La Torre MD   metoprolol succinate (TOPROL-XL) 50 mg 24 hr tablet Take 1 tablet (50 mg total) by mouth daily 6/1/18  Yes Luciano Echevarria MD   Mometasone Furo-Formoterol Fum (DULERA) 200-5 MCG/ACT AERO Inhale 2 puffs every 12 (twelve) hours 6/1/18  Yes Aubree Fiore MD   Nebulizers (AERONEB GO NEBULIZER HANDSET) [address] [place] [address] by Does not apply route 2 (two) times a day as needed (wheezing) 6/8/18  Yes Aubree Fiore MD   nicotine (NICODERM CQ) 7 mg/24hr TD 24 hr patch Place 1 patch on the skin every 24 hours 6/8/18  Yes Luciano Echevarria MD   Tiotropium Bromide Monohydrate (SPIRIVA RESPIMAT) 2 5 MCG/ACT AERS Inhale 1 Act (2 5 mcg total) daily 6/1/18  Yes Luciano Echevarria MD   VENTOLIN  (90 Base) MCG/ACT inhaler Inhale 2 puffs every 4 (four) hours as needed for wheezing or shortness of breath 6/1/18  Yes Luciano Echevarria MD   vitamin A 10,000 units capsule Take 3 capsules (30,000 Units total) by mouth daily 6/1/18  Yes Luciano Echevarria MD   amiodarone 200 mg tablet Take 1 tablet (200 mg total) by mouth 2 (two) times a day with meals for 4 days 6/2/18 6/13/18  Aubree Fiore MD       Allergies     Allergies   Allergen Reactions    Other      Brown cloth band aids          Objective     Vitals:    06/13/18 1913 06/13/18 2008 06/13/18 2019 06/13/18 2100   BP: (!) 67/44 (!) 70/45 (!) 83/47 (!) 86/55   BP Location: Right arm Right arm Right arm Right arm   Pulse: 71 81 72 92   Resp: 20 20 20 20   Temp:       TempSrc:       SpO2: 95% 95% 95% 95%   Weight:         Body mass index is 25 82 kg/m²  Intake/Output Summary (Last 24 hours) at 06/13/18 2140  Last data filed at 06/13/18 2055   Gross per 24 hour   Intake             4250 ml   Output              250 ml   Net             4000 ml     Invasive Devices     Peripheral Intravenous Line            Peripheral IV 06/13/18 Left Antecubital less than 1 day    Peripheral IV 06/13/18 Right Antecubital less than 1 day          Drain            Colostomy  RUQ 22 days                Physical Exam  GENERAL: Middle-aged male, appearing older than stated age, sitting in the chair acutely anxious appearing tremulous with his hands on his genitals when I entered the room  Dried feces present on the patient's hands, abdomen as well as his clothing  There is a strong odor of feces  Patient has a right-sided diverting colostomy with liquid green output  Patient acutely confused appearing and in appropriately answering questions  Patient stating that he just removed all of his lines because he felt like he was tied down  Patient additionally stating that he needs to urinate  HEENT: Normocephalic and atraumatic  No scleral icterus  PERRLA  EOMI B/L  No oropharyngeal edema  Mucous membranes appear dry, angular chelitis  NECK: Neck supple with no lymphadenopathy  Trachea midline  No JVD  No carotid bruit auscultated   CARDIOVASCULAR: S1 and S2 are present  Regular rate and rhythm  No murmurs, rubs, or gallops  RESPIRATORY: CTA B/L, no rales, rhonci  Minimal biphasic wheeze appreciated best at the apices bilaterally  Normal respiratory expansion    Patient able to speak in full sentences, no accessory muscle recruitment, no costal retraction, no tugging  Patient's oxygen saturation rates approximately 94% while I was in the room   ABDOMINAL: Right-sided diverting colostomy present with dressing in place intact, with green liquid output in place  Notable dried feces present around abdomen  Patient has a midline incision a with dressing intact, I did not remove this dressing to fully assess the wound, but there was no circumferential crepitus or fluctuance noted at the incision site  There was no circumferential erythema or calor  EXTREMITIES: 2+ DP and PT pulses bilaterally; there is spooning present of his nails some present on bilateral hands, there is some questionable clubbing present as well  There are notable yellowing on the distal surfaces of his fingers suggestive of tobacco use as well as a significant amount of dried feces present on his hands as well as subungual   There are areas of ecchymosis present on his anterior shins right greater than left   MUSCULOSKELETAL: No joint tenderness, deformity or swelling, full range of motion without pain  NEUROLOGIC: Patient is alert and oriented to person, place, but not time (patient stating he believes it is 2007 or 2008)  No sensory or motor deficits  CN 2-12 intact  Speech fluent  Of note, the patient seems to lack significant insight into his medical condition and is unsure of his past medical history and unable to provide much explanation to his previous medical care  Additionally, the patient intermittently is confused  Specific example: The patient asked me if I knew where his car was, when I explained to him that he came via EMS and that I was unsure where his vehicle might be he said 0, okay I guess my cars at home then     SKIN: As above, midline incision dressing intact, diverting colostomy with bag in place on right with dark green liquid output, significant areas of dried feces present on his hands as well as his abdomen  PSYCHIATRIC: Normal mood and affect     Lab Results: I have personally reviewed pertinent reports  Results Reviewed     Procedure Component Value Units Date/Time    Cortisol [[id number]]     Lab Status:  No result Specimen:  Blood     Urine Microscopic [[id number]]  (Abnormal) Collected:  06/13/18 2024    Lab Status:  Final result Specimen:  Urine from Urine, Clean Catch Updated:  06/13/18 2144     RBC, UA None Seen /hpf      WBC, UA 10-20 (A) /hpf      Epithelial Cells None Seen /hpf      Bacteria, UA Occasional /hpf      OTHER OBSERVATIONS Yeast Cells Present    Urine culture [[id number]] Collected:  06/13/18 2024    Lab Status: In process Specimen:  Urine from Urine, Clean Catch Updated:  06/13/18 2191    Basic metabolic panel [[id number]]  (Abnormal) Collected:  06/13/18 2022    Lab Status:  Final result Specimen:  Blood from Arm, Left Updated:  06/13/18 2046     Sodium 138 mmol/L      Potassium 4 7 mmol/L      Chloride 106 mmol/L      CO2 22 mmol/L      Anion Gap 10 mmol/L      BUN 37 (H) mg/dL      Creatinine 3 37 (H) mg/dL      Glucose 65 mg/dL      Calcium 7 4 (L) mg/dL      eGFR 19 ml/min/1 73sq m     Narrative:         National Kidney Disease Education Program recommendations are as follows:  GFR calculation is accurate only with a steady state creatinine  Chronic Kidney disease less than 60 ml/min/1 73 sq  meters  Kidney failure less than 15 ml/min/1 73 sq  meters      CBC and differential [[id number]]  (Abnormal) Collected:  06/13/18 2022    Lab Status:  Final result Specimen:  Blood from Arm, Left Updated:  06/13/18 2031     WBC 16 95 (H) Thousand/uL      RBC 3 02 (L) Million/uL      Hemoglobin 9 4 (L) g/dL      Hematocrit 30 4 (L) %       (H) fL      MCH 31 1 pg      MCHC 30 9 (L) g/dL      RDW 16 1 (H) %      MPV 9 8 fL      Platelets 198 Thousands/uL      nRBC 0 /100 WBCs      Neutrophils Relative 80 (H) %      Immat GRANS % 2 %      Lymphocytes Relative 11 (L) %      Monocytes Relative 6 %      Eosinophils Relative 1 %      Basophils Relative 0 %      Neutrophils Absolute 13 70 (H) Thousands/µL      Immature Grans Absolute 0 38 (H) Thousand/uL      Lymphocytes Absolute 1 78 Thousands/µL      Monocytes Absolute 0 96 Thousand/µL      Eosinophils Absolute 0 11 Thousand/µL      Basophils Absolute 0 02 Thousands/µL     POCT urinalysis dipstick [[id number]]  (Abnormal) Resulted:  06/13/18 2017    Lab Status:  Final result Specimen:  Urine Updated:  06/13/18 2020     Color, UA see chart results    ED Urine Macroscopic [[id number]]  (Abnormal) Collected:  06/13/18 2024    Lab Status:  Final result Specimen:  Urine Updated:  06/13/18 2017     Color, UA Yellow     Clarity, UA Cloudy     pH, UA 5 5     Leukocytes, UA Trace (A)     Nitrite, UA Negative     Protein, UA 30 (1+) (A) mg/dl      Glucose, UA Negative mg/dl      Ketones, UA Negative mg/dl      Urobilinogen, UA 0 2 E U /dl      Bilirubin, UA Interference- unable to analyze (A)     Blood, UA Negative     Specific Gravity, UA 1 020    Narrative:       CLINITEK RESULT    Blood culture #2 [[id number]] Collected:  06/13/18 1957    Lab Status: In process Specimen:  Blood from Arm, Right Updated:  06/13/18 2000    Blood culture #1 [[id number]] Collected:  06/13/18 1957    Lab Status: In process Specimen:  Blood from Arm, Left Updated:  06/13/18 2000    Procalcitonin [[id number]]  (Abnormal) Collected:  06/13/18 1817    Lab Status:  Final result Specimen:  Blood from Arm, Right Updated:  06/13/18 1934     Procalcitonin 0 80 (H) ng/ml     Lactic acid, plasma [[id number]]  (Normal) Collected:  06/13/18 1711    Lab Status:  Final result Specimen:  Blood from Arm, Left Updated:  06/13/18 1737     LACTIC ACID 1 3 mmol/L     Narrative:         Result may be elevated if tourniquet was used during collection      Comprehensive metabolic panel [[id number]]  (Abnormal) Collected:  06/13/18 1512    Lab Status:  Final result Specimen:  Blood from Arm, Right Updated: 06/13/18 1615     Sodium 133 (L) mmol/L      Potassium 5 7 (H) mmol/L      Chloride 97 (L) mmol/L      CO2 26 mmol/L      Anion Gap 10 mmol/L      BUN 45 (H) mg/dL      Creatinine 4 53 (H) mg/dL      Glucose 93 mg/dL      Calcium 8 5 mg/dL      AST 26 U/L      ALT 18 U/L      Alkaline Phosphatase 124 (H) U/L      Total Protein 6 8 g/dL      Albumin 2 0 (L) g/dL      Total Bilirubin 0 49 mg/dL      eGFR 13 ml/min/1 73sq m     Narrative:         National Kidney Disease Education Program recommendations are as follows:  GFR calculation is accurate only with a steady state creatinine  Chronic Kidney disease less than 60 ml/min/1 73 sq  meters  Kidney failure less than 15 ml/min/1 73 sq  meters  Lipase [[id number]]  (Normal) Collected:  06/13/18 1512    Lab Status:  Final result Specimen:  Blood from Arm, Right Updated:  06/13/18 1615     Lipase 278 u/L     Troponin I [[id number]]  (Normal) Collected:  06/13/18 1512    Lab Status:  Final result Specimen:  Blood from Arm, Right Updated:  06/13/18 1606     Troponin I <0 02 ng/mL     Narrative:         [employer] Chemistry analyzer 99% cutoff is > 0 04 ng/mL in network labs    o cTnI 99% cutoff is useful only when applied to patients in the clinical setting of myocardial ischemia  o cTnI 99% cutoff should be interpreted in the context of clinical history, ECG findings and possibly cardiac imaging to establish correct diagnosis  o cTnI 99% cutoff may be suggestive but clearly not indicative of a coronary event without the clinical setting of myocardial ischemia      CBC and differential [[id number]]  (Abnormal) Collected:  06/13/18 1512    Lab Status:  Final result Specimen:  Blood from Arm, Right Updated:  06/13/18 1554     WBC 19 56 (H) Thousand/uL      RBC 3 20 (L) Million/uL      Hemoglobin 10 1 (L) g/dL      Hematocrit 32 5 (L) %       (H) fL      MCH 31 6 pg      MCHC 31 1 (L) g/dL      RDW 16 1 (H) %      MPV 10 0 fL      Platelets 135 (H) Thousands/uL      nRBC 0 /100 WBCs     Narrative: This is an appended report  These results have been appended to a previously verified report  Imaging: I have personally reviewed pertinent films in PACS  Ct Abdomen Pelvis Wo Contrast    Result Date: 6/13/2018  Narrative: CT ABDOMEN AND PELVIS WITHOUT IV CONTRAST INDICATION:   LLQ pain, hypotension, status post  ileocolectomy with primary anastomosis and diverting ileostomy  COMPARISON: CT scan 5/15/2018 TECHNIQUE:  CT examination of the abdomen and pelvis was performed without intravenous contrast   Axial, sagittal, and coronal 2D reformatted images were created from the source data and submitted for interpretation  Radiation dose length product (DLP) for this visit:  434 9 mGy-cm   This examination, like all CT scans performed in the [facility], was performed utilizing techniques to minimize radiation dose exposure, including the use of iterative reconstruction and automated exposure control  Enteric contrast was not administered  FINDINGS: ABDOMEN LOWER CHEST:  Left lower lobe consolidation suspicious for pneumonia  No pericardial or pleural effusion  LIVER/BILIARY TREE:  Improved hepatic steatosis  GALLBLADDER:  There are gallstone(s) within the gallbladder, without pericholecystic inflammatory changes  SPLEEN:  Unremarkable  PANCREAS:  Unremarkable  ADRENAL GLANDS:  Unremarkable  KIDNEYS/URETERS:  Stable bilateral nonobstructing intrarenal calculi and complex proteinaceous cyst in the left kidney upper pole  No hydronephrosis  STOMACH AND BOWEL:  Status post ileocolectomy and diverting ileostomy  No bowel obstruction  No abscess  APPENDIX:  Removed  ABDOMINOPELVIC CAVITY:  No ascites or free intraperitoneal air  Postsurgical stranding in the omentum  No lymphadenopathy  VESSELS:  Unremarkable for patient's age  PELVIS REPRODUCTIVE ORGANS:  Unremarkable for patient's age  URINARY BLADDER:  Unremarkable   ABDOMINAL WALL/INGUINAL REGIONS:  Right mid abdomen ileostomy  OSSEOUS STRUCTURES:  No acute fracture or destructive osseous lesion  Impression: Left lower lobe consolidation concerning for pneumonia  Status post bowel surgery without evidence of obstruction or abscess  Stable bilateral nonobstructing intrarenal calculi and complex left renal cyst  Workstation performed: [id number]     Ct Chest Abdomen Pelvis Wo Contrast    Result Date: 5/15/2018  Narrative: CT CHEST, ABDOMEN AND PELVIS WITHOUT IV CONTRAST INDICATION:   sob  Malaise, weakness, incontinence COMPARISON: 12/7/2016 TECHNIQUE: CT examination of the chest, abdomen and pelvis was performed without intravenous contrast   Axial, sagittal, and coronal 2D reformatted images were created from the source data and submitted for interpretation  Radiation dose length product (DLP) for this visit:  727 44 mGy-cm   This examination, like all CT scans performed in the [facility], was performed utilizing techniques to minimize radiation dose exposure, including the use of iterative  reconstruction and automated exposure control  Enteric contrast was not administered  FINDINGS: CHEST LUNGS:  Subtle increased ground glass and nodular densities right lung apex may be inflammatory/infectious  3 mm right lower lung nodular density series 3 image 39, is unchanged  Additional scattered tiny nodular densities bilaterally appear similar  There is no tracheal or endobronchial lesion  PLEURA:  Unremarkable  HEART/GREAT VESSELS:  Unremarkable for patient's age  Coronary artery calcifications  MEDIASTINUM AND ADRIANNA:  Unremarkable  CHEST WALL AND LOWER NECK:   Unremarkable  ABDOMEN LIVER/BILIARY TREE:  Hepatic steatosis  GALLBLADDER:  Tiny layering calcified gallstone  No pericholecystic inflammatory change  SPLEEN:  Unremarkable  PANCREAS:  Unremarkable  ADRENAL GLANDS:  Unremarkable  KIDNEYS/URETERS:  Nonobstructing renal calculi bilaterally measuring up to 6 mm  No hydronephrosis    Stable probable proteinaceous cyst left upper kidney measuring 2 x 1 6 cm  STOMACH AND BOWEL:  Although exam is limited without intravenous contrast, there appears to be wall thickening centered around the sutures in the hepatic flexure of the colon  There is associated inflammatory change and infiltration of the adjacent fat  There may be some involvement of the adjacent descending duodenum  Findings may be related to the history of Crohn's disease/ulcerative colitis  Occult tumor not excluded at this time  No bowel obstruction  APPENDIX:  No findings to suggest appendicitis  ABDOMINOPELVIC CAVITY:  No ascites or free intraperitoneal air  No lymphadenopathy  VESSELS:  Unremarkable for patient's age  PELVIS REPRODUCTIVE ORGANS:  Unremarkable for patient's age  Prostate calcifications  URINARY BLADDER:  Unremarkable  ABDOMINAL WALL/INGUINAL REGIONS:  Unremarkable  OSSEOUS STRUCTURES:  No acute fracture or destructive osseous lesion  Impression: 1  Although exam is limited without intravenous contrast, there appears to be wall thickening centered around the sutures in the hepatic flexure of the colon, with associated inflammatory change and infiltration of the adjacent fat  There may be some involvement of the adjacent descending duodenum  Findings may be related to the history of Crohn's disease/ulcerative colitis  Occult tumor not excluded at this time  Follow-up to resolution recommended  2   Subtle increased ground glass and nodular densities right lung apex may be inflammatory/infectious  CT follow-up in 3-6 months may be considered to ensure resolution  Additional tiny nodular densities bilaterally appear similar  3   Nonobstructing renal calculi  Workstation performed: [id number]     Xr Chest Portable    Result Date: 5/26/2018  Narrative: CHEST INDICATION:   Wheezing   COMPARISON:  5/23/2018 EXAM PERFORMED/VIEWS:  XR CHEST PORTABLE FINDINGS:  The endotracheal tube and endogastric tube has been removed  There is leftward mediastinal shift  There is consolidation of a portion of the left lung in the suprahilar area and also at the base  Given the volume loss, this is probably significant atelectasis although interval development of pneumonia is not entirely excluded and continued follow-up  is suggested  The right lung appears clear  No gross evidence of pleural fluid or pneumothorax  There is slight narrowing of the tracheal lumen in its midportion  Etiology uncertain  There is a scoliotic curvature of the spine with reverse S configuration  There is a tubular device over the left side of abdomen, perhaps an enteric tube or other type of artifactual density  Impression: Considerable volume loss in left hemithorax with consolidation of portion of left lung which could be atelectasis (favored) or pneumonia as an alternate consideration  Recommend continued follow-up and appropriate clinical correlation  Patient extubated  Workstation performed: [id number]     Xr Chest Portable    Result Date: 5/23/2018  Narrative: CHEST INDICATION:   Intubated  COMPARISON:  Chest 5/22/2018 EXAM PERFORMED/VIEWS:  XR CHEST PORTABLE FINDINGS:  Endotracheal tube is present, in satisfactory position with its tip above the level of the amy  Enteric tube is present with its tip extending below the left hemidiaphragm  Cardiomediastinal silhouette appears unremarkable  There is a persistent left basilar opacity and blunting of left costophrenic angle  No additional focal consolidation or pneumothorax  Persistent pulmonary vascular congestion  Osseous structures appear within normal limits for patient age  Impression: 1  Persistent left basilar opacity compatible with effusion and a component of atelectasis or infection  2   Pulmonary vascular congestion  Workstation performed: [id number]     Xr Chest Portable    Result Date: 5/22/2018  Narrative: CHEST INDICATION:   Status post intubation   COMPARISON: 5/22/2018 EXAM PERFORMED/VIEWS:  XR CHEST PORTABLE FINDINGS:  Endotracheal tube is present, in satisfactory position with its tip above the level of the amy  Enteric tube is present with its tip extending below the left hemidiaphragm  Cardiomediastinal silhouette appears unremarkable  There is increased left basilar opacity compatible with small pleural effusion and probable left basilar consolidation on the basis of atelectasis or pneumonia  There is diffusely increased interstitial prominence system with fluid overload  No pneumothorax  Osseous structures appear within normal limits for patient age  Impression: Endotracheal tube in satisfactory position  Vascular congestion  Small left pleural effusion with probable left basilar consolidation due to atelectasis or pneumonia  Workstation performed: [id number]     Xr Chest Portable    Result Date: 5/19/2018  Narrative: CHEST INDICATION:   post intubation  COMPARISON:  5/19/2018 EXAM PERFORMED/VIEWS:  XR CHEST PORTABLE FINDINGS:  ET tube tip is approximately 2 5 cm above the amy  NG tube tip below the diaphragm  Cardiomediastinal silhouette appears unremarkable  Interval increased interstitial and hazy airspace densities bilaterally  Osseous structures appear within normal limits for patient age  Impression: Interval increased interstitial and hazy airspace densities bilaterally  Workstation performed: [id number]     Xr Chest Portable    Result Date: 5/19/2018  Narrative: CHEST INDICATION:   severe respiratory distress r/o Pneumothorax  COMPARISON:  5/19/2018 EXAM PERFORMED/VIEWS:  XR CHEST PORTABLE FINDINGS: Heart shadow is enlarged but unchanged from prior exam  The lungs are clear  No pneumothorax or pleural effusion  Osseous structures appear within normal limits for patient age  Impression: No acute cardiopulmonary disease   Workstation performed: [id number]     Xr Chest Portable    Result Date: 5/19/2018  Narrative: CHEST INDICATION: hypoxia  COMPARISON:  5/17/2018 EXAM PERFORMED/VIEWS:  XR CHEST PORTABLE FINDINGS: The cardiac silhouette is without change from the prior study  No acute pulmonary disease, no pneumothorax or pleural effusion  Osseous structures appear within normal limits for patient age  Impression: No change from 5/17/2018 Workstation performed: [id number]     Xr Chest Portable    Result Date: 5/18/2018  Narrative: CHEST INDICATION:   hypoxia  COMPARISON:  5/16/2018 EXAM PERFORMED/VIEWS:  XR CHEST PORTABLE FINDINGS: Cardiomediastinal silhouette appears unremarkable  The lungs are clear  No pneumothorax or pleural effusion  Osseous structures appear within normal limits for patient age  Impression: No acute cardiopulmonary disease  Workstation performed: [id number]     Xr Chest Portable    Result Date: 5/16/2018  Narrative: CHEST INDICATION:   Shortness of breath  COMPARISON:  5/15/2018 EXAM PERFORMED/VIEWS:  XR CHEST PORTABLE FINDINGS: The cardiac silhouette is without change from the prior study  A very small left effusion is suspected  Bibasilar atelectasis noted  No pneumothorax or pulmonary edema  Osseous structures appear within normal limits for patient age  Impression: Very small left effusion with bibasilar subsegmental atelectasis Workstation performed: [id number]     Xr Chest Portable    Result Date: 5/15/2018  Narrative: CHEST INDICATION: 61-year-old male, shortness of breath COMPARISON: 12/7/2016 chest x-ray EXAM PERFORMED/VIEWS:  XR CHEST PORTABLE Single image FINDINGS: Cardiomediastinal silhouette appears unremarkable  The lungs are clear  No pneumothorax or pleural effusion  Osseous structures appear within normal limits for patient age  Impression: No acute cardiopulmonary disease  Findings are stable   Findings are consistent with emergency provider's preliminary reading Workstation performed: [id number]     Xr Chest 2 Views    Result Date: 6/13/2018  Narrative: CHEST INDICATION: "HYPOTENSION"  Possible pneumonia  COMPARISON:  AP chest 5/26/2018  EXAM PERFORMED/VIEWS:  XR CHEST PA & LATERAL FINDINGS: Cardiomediastinal silhouette appears unremarkable  The lungs are clear  No pneumothorax or pleural effusion  Osseous structures appear within normal limits for patient age  Impression: No acute cardiopulmonary disease  Workstation performed: [id number]     Xr Abdomen 1 Vw Portable    Result Date: 5/22/2018  Narrative: ABDOMEN INDICATION: Instrument miscount in OR  COMPARISON:  10/23/2016 VIEWS:  AP supine FINDINGS: There are surgical drain projecting over the left lateral abdomen and right lower quadrant  No metallic surgical instruments identified  Partially imaged enterogastric feeding tube in the left upper quadrant  There is a nonspecific bowel gas pattern  Visualized osseous structures are unremarkable for the patient's age  Impression: No metallic surgical instrument identified  Workstation performed: [id number]     Xr Chest Portable Icu    Result Date: 5/22/2018  Narrative: CHEST INDICATION:   f/u resp failure  COMPARISON:  Chest x-ray from 5/21/2018  EXAM PERFORMED/VIEWS:  XR CHEST PORTABLE ICU FINDINGS:  Endotracheal tube and nasogastric tube have been removed  Cardiomediastinal silhouette appears unremarkable  The lungs are clear  No pneumothorax or pleural effusion  Osseous structures appear within normal limits for patient age  There is free intraperitoneal air  In retrospect, this was present on the 5/21/2018 chest x-ray, but was obscured by overlying EKG leads  Impression: There is free intraperitoneal air  I personally discussed this study with Yaima Grajeda on 5/22/2018 at 8:51 AM  Workstation performed: [id number]     Xr Chest Portable Icu    Result Date: 5/21/2018  Narrative: CHEST INDICATION:   RESP FAILURE  COMPARISON:  5/20/2018 EXAM PERFORMED/VIEWS:  XR CHEST PORTABLE ICU FINDINGS: Cardiomegaly noted as before   Endotracheal and nasogastric tubes both in satisfactory position  Mild central congestion  No consolidation or pneumothorax  Small left basilar pleural effusion  Osseous structures appear within normal limits for patient age  Impression: Stable chest  Workstation performed: [id number]     Xr Chest Portable Icu    Result Date: 5/20/2018  Narrative: CHEST INDICATION:   Respiratory failure  COMPARISON:  May 19, 2018 EXAM PERFORMED/VIEWS:  XR CHEST PORTABLE ICU FINDINGS:  Endotracheal tube and nasogastric tube in place  Cardiomediastinal silhouette appears unremarkable  Patchy right infrahilar airspace opacity likely atelectasis  Stable from prior  Improved aeration in the left lung  Osseous structures appear within normal limits for patient age  Impression: Improved aeration left lung  Persistent right infrahilar opacity is likely atelectasis  Workstation performed: [id number]       Microbiology: cultures obtained in emergency department include blood cultures x2  Urinalysis:   Results from last 7 days  Lab Units 06/13/18 2024   COLOR UA  Yellow   CLARITY UA  Cloudy   SPEC GRAV UA  1 020   PH UA  5 5   LEUKOCYTES UA  Trace*   NITRITE UA  Negative   PROTEIN UA mg/dl 30 (1+)*   GLUCOSE UA mg/dl Negative   KETONES UA mg/dl Negative   BILIRUBIN UA  Interference- unable to analyze*   BLOOD UA  Negative        Urine Micro:        EKG, Pathology, and Other Studies: I have personally reviewed pertinent reports        Medications given in Emergency Department     Medication Administration - last 24 hours from 06/12/2018 2140 to 06/13/2018 2140       Date/Time Order Dose Route Action Action by     06/13/2018 1603 sodium chloride 0 9 % bolus 2,000 mL 0 mL Intravenous Stopped Xavier Teague RN     06/13/2018 1512 sodium chloride 0 9 % bolus 2,000 mL 2,000 mL Intravenous Michael 37 Xavier Teague RN     06/13/2018 1927 sodium chloride 0 9 % bolus 1,000 mL 0 mL Intravenous Stopped Xavier Teague RN     06/13/2018 1818 sodium chloride 0 9 % bolus 1,000 mL 1,000 mL Intravenous New Bag Chelita Guidry, RN     06/13/2018 2007 vancomycin (VANCOCIN) IVPB (premix) 1,000 mg   Intravenous Canceled Entry Chelita Guidry, RN     06/13/2018 2007 cefepime (MAXIPIME) 2 g/50 mL dextrose IVPB   Intravenous Canceled Entry Chelita Guidry, RN     06/13/2018 2055 sodium chloride 0 9 % bolus 1,000 mL 0 mL Intravenous Stopped Chelita Guidry, RN     06/13/2018 2017 sodium chloride 0 9 % bolus 1,000 mL 1,000 mL Intravenous New Bag Kayleigh Nunn RN          Assessment and Plan     Problem List     Colitis    Spinal stenosis of cervical region    Hypertension    Pericarditis    Coronary artery disease involving native coronary artery of native heart    Leukocytosis    Asthma    Atherosclerosis of coronary artery    COPD with exacerbation ([facility] [address] )    Overview Signed 2/3/2018  2:00 PM by Haley Sood MD     Transitioned From: Chronic obstructive pulmonary disease         Dyslipidemia    Acute kidney failure ([address] )    Alcohol abuse    Tobacco abuse    Alcohol dependence with withdrawal ([address] )    Acute respiratory insufficiency    Agitation    Heart failure, systolic, due to idiopathic cardiomyopathy ([address] )    Presence of ileostomy (HCC)    Moderate protein-calorie malnutrition ([address] )    Malnutrition Findings:           BMI Findings: Body mass index is 25 82 kg/m²  Anemia    Hypocalcemia    Perforation of colon ([address] )      1  Hypotension, systolics in between 52A and 80s  Likely secondary to volume depletion as ascertain by exam and IVC compressibility on ultrasound  Unlikely to be infectious given his negative procalcitonin  Initiate aggressive IV fluid resuscitation with initial bolus, patient has received 4 L at this point and is still examining and is volume contracted  The patient did receive steroids while he was admitted recently and adrenal insufficiency cannot be completely ruled out    Continue to monitor clinically, initially patient was asymptomatic, but at the time of my exam he was becoming increasingly confused  Will continue to monitor off of antibiotics at and pressors at this time  2   Intermittent altered mental status, acutely tremulous appearing, patient pulling out IV and removing EKG and pulse oximetry  Patient increasing leak confused, stating that he thinks it is 2007 or 2008  Additionally patient asking where his car is  When I explained to him that he was brought in via EMS, and likely his car was at home he appeared to be somewhat confused by this initially and then was able to understand  Patient has a rather extensive alcohol history, but he is adamant in his denial that he has had any alcohol to drink since he was most recently admitted to the hospital at the beginning of the month  An ethanol level was ordered to verify the patient's statements of abstinence  3   Leukocytosis, likely secondary to volume contraction and hemoconcentration, but infectious etiology cannot completely be ruled out secondary to his poor overall hygiene and habitus  Patient has numerous sites for possible infection inclusive of a midline incision, diverting colostomy, overall poor hygiene with numerous small abrasions, left lower lobe consolidation  Notably on physical exam there is a biphasic wheezing notable best at the apices bilaterally  4   History of hypertension, holding all antihypertensive medications at this time secondary to hypotension    5  Alcohol abuse, chronic condition for the patient, patient adamantly stating that he has been abstinent from alcohol since he was admitted to the hospital at the beginning of the month  An ethanol level has been drawn to confirm this  6   A KI on CKD, creatinine 3 37 today from a stable baseline of approximately 0 7-0 8  Avoid nephrotoxin medications, aggressive IV fluid resuscitation as this is likely pre renal in etiology  Continue to monitor closely and fluid resuscitated as indicated        Code Status: Level 1 - Full Code  VTE Pharmacologic Prophylaxis: Heparin   VTE Mechanical Prophylaxis: sequential compression device  Admission Status: INPATIENT     Admission Time  I spent 1 hour admitting the patient  This involved direct patient contact where I performed a full history and physical, reviewing previous records, and reviewing laboratory and other diagnostic studies      Orval Pew, DO  Internal Medicine  PGY-1

## 2018-06-14 NOTE — CASE MANAGEMENT
Thank you,  [address] [facility] in the [facility] by Amrit Vallejo for 2017  Network Utilization Review Department  Phone: [phone]; Fax [phone]  ATTENTION: The Network Utilization Review Department is now centralized for our 7 Facilities  Make a note that we have a new phone and fax numbers for our Department  Please call with any questions or concerns to [phone] and carefully follow the prompts so that you are directed to the right person  All voicemails are confidential  Fax any determinations, approvals, denials, and requests for initial or continue stay review clinical to [phone]  Due to HIGH CALL volume, it would be easier if you could please send faxed requests to expedite your requests and in part, help us provide discharge notifications faster  Initial Clinical Review    Admission: Date/Time/Statement: 6/13/18 AT 2107 ADMIT INPATIENT TO ICU    Orders Placed This Encounter   Procedures    Inpatient Admission (expected length of stay for this patient is greater than two midnights)     Standing Status:   Standing     Number of Occurrences:   1     Order Specific Question:   Admitting Physician     Answer:   Chico Guerrier [[id number]]     Order Specific Question:   Level of Care     Answer:   Level 1 Stepdown [13]     Order Specific Question:   Estimated length of stay     Answer:   More than 2 Midnights     Order Specific Question:   Certification     Answer:   I certify that inpatient services are medically necessary for this patient for a duration of greater than two midnights  See H&P and MD Progress Notes for additional information about the patient's course of treatment       ED: Date/Time/Mode of Arrival:   ED Arrival Information     Expected Arrival Acuity Means of Arrival Escorted By Service Admission Type    - 6/13/2018 14:58 [facility] [address] [facility] General Medicine Emergency    Arrival Complaint    Hypotension Chief Complaint:   Chief Complaint   Patient presents with    Hypotension     Patient comes from home where per EMS visiting nurse came to see patient and checked BP and got reading of 50/30 three times  Per EMS patient was 60/40s  Patient denies symptoms  Denies CP or SOB  Patient GCS is 15  Patient states he was recentely admitted to the ICU but is unsure why        Chief complaint:  Hypotension     History of Present Illness      Fran Hernandez is a 66 y o  male with past medical history significant for COPD, hypertension, coronary artery disease status post PCI in 2012, status post right hemicolectomy secondary to perforated transverse colon, alcohol abuse, tobacco abuse and ulcerative colitis/Crohn's who presents the emergency department this evening after having been evaluated by [facility] while he was at home and noted to have significant hypotension with a systolic blood pressure in the 60s  The patient at that point had no significant complaints and was asymptomatic  He did affirm some mild fatigue to the EMS staff  At the time of presentation to the emergency department his blood pressure was 66/41 and he was mentating appropriately with warm and well perfused extremities  At that point he appeared severely volume depleted and was started on aggressive IV fluid resuscitation  He was evaluated with a bedside ultrasound which showed collapsibility of his IVC further indicating his severely volume depleted status  At this point was determined that the patient was appropriate for admission to the medicine service with the Critical Care Resource consultation  The case was discussed between the emergency department attending and critical care attending      At the time of my evaluation for admission to the hospital I found the patient in his room sitting in the chair tremulous, diaphoretic and acutely confused    Patient stated that he became disoriented by the wires on his chest and started pulling them out (he was able to pull out his 20 gauge IV but not his 16 gauge IV)  Patient stated that he needed to urinate and that there was not a urinal to use  My ability to obtain an accurate and comprehensive history was limited secondary to the patient being an overall poor historian as well as him being somewhat more confused than previously described  Patient was asking me if I knew where his car was  When I explained him that he was brought to the emergency department via EMS he was able to understand his car was likely at home  Patient was oriented to person and place, but not to time stating that he thought it was 2007 or 2008      I did spend significant amount of time attempting to discussed with the patient his alcohol use  Patient is adamant that he has not consumed any alcohol since he was previously admitted at the beginning of the month  I attempted to discussed with the patient that I was not attempted to be drained mental but that it was of utmost importance that he disclose whether not he had been consuming alcohol on a sleepy as it was imperative to her appropriate treatment of his numerous medical conditions      Review of Systems     Constitutional: Positive for fatigue  Negative for activity change, appetite change, chills, diaphoresis, fever and unexpected weight change  Respiratory: Negative for cough, choking, chest tightness, shortness of breath, wheezing and stridor  Cardiovascular: Negative for chest pain, palpitations and leg swelling  Gastrointestinal: Negative for abdominal distention, abdominal pain, constipation, diarrhea, nausea and vomiting  Genitourinary: Negative for difficulty urinating, dysuria, enuresis, flank pain, frequency, hematuria and urgency  Musculoskeletal: Negative for arthralgias, back pain, joint swelling, neck pain and neck stiffness     Neurological: Positive for dizziness, tremors, weakness, light-headedness and headaches         ED Vital Signs: ED Triage Vitals   Temperature Pulse Respirations Blood Pressure SpO2   06/13/18 1505 06/13/18 1507 06/13/18 1507 06/13/18 1507 06/13/18 1507   99 6 °F (37 6 °C) 89 20 (!) 66/41 94 %      Temp Source Heart Rate Source Patient Position - Orthostatic VS BP Location FiO2 (%)   06/13/18 1505 06/13/18 1507 06/13/18 1507 06/13/18 1507 --   Oral Monitor Sitting Left arm       Pain Score       06/13/18 1505       No Pain        Wt Readings from Last 1 Encounters:   06/14/18 69 6 kg (153 lb 7 oz)      06/13 0701  06/14 0700 06/14 0701 06/14 1140  Most Recent    Temperature (°F) 97 299 6 97 298 2  98 2 (36 8)    Pulse 106 7486  80    Respirations 1629 1524  18    Blood Pressure 66/41121/79 91/58132/84  104/69    SpO2 (%) 9399 9399  99        LABS/Diagnostic Test Results:   CBC  Results from last 7 days  Lab Units 06/13/18 2022 06/13/18  1512   WBC Thousand/uL 16 95* 19 56*   HEMOGLOBIN g/dL 9 4* 10 1*   HEMATOCRIT % 30 4* 32 5*   PLATELETS Thousands/uL 336 399*   NEUTROS PCT % 80*  --    MONOS PCT % 6  --    MONO PCT MAN %  --  3*      CMP  Results from last 7 days  Lab Units 06/13/18 2022 06/13/18  1512   SODIUM mmol/L 138 133*   POTASSIUM mmol/L 4 7 5 7*   CHLORIDE mmol/L 106 97*   CO2 mmol/L 22 26   BUN mg/dL 37* 45*   CREATININE mg/dL 3 37* 4 53*   CALCIUM mg/dL 7 4* 8 5   TOTAL PROTEIN g/dL  --  6 8   BILIRUBIN TOTAL mg/dL  --  0 49   ALK PHOS U/L  --  124*   ALT U/L  --  18   AST U/L  --  26   GLUCOSE RANDOM mg/dL 65 93       Results from last 7 days  Lab Units 06/13/18  1711   LACTIC ACID mmol/L 1 3     Lab Value Date/Time   TROPONINI <0 02 06/13/2018 1512       Results from last 7 days  Lab Units 06/13/18  1711   LACTIC ACID mmol/L 1 3        Microbiology Results (last 21 days)   Procedure Component Value - Date/Time   MRSA culture [[id number]] Collected: 06/14/18 0832   Lab Status:  In process Specimen: Nares from Nose Updated: 06/14/18 0900   Urine culture [[id number]] Collected: 06/13/18 2024   Lab Status: In process Specimen: Urine from Urine, Clean Catch Updated: 06/13/18 2144   Blood culture #1 [[id number]] Collected: 06/13/18 1957   Lab Status: In process Specimen: Blood from Arm, Left Updated: 06/13/18 2000   Blood culture #2 [[id number]] Collected: 06/13/18 1957   Lab Status: In process Specimen: Blood from Arm, Right Updated: 06/13/18 2000       CT CHEST ABDOMEN PELVIS -   Left lower lobe consolidation concerning for pneumonia  Status post bowel surgery without evidence of obstruction or abscess   Stable bilateral nonobstructing intrarenal calculi and complex left renal cyst        ED Treatment:   Medication Administration from 06/13/2018 1458 to 06/14/2018 0224       Date/Time Order Dose Route Action Action by Comments     06/13/2018 1603 sodium chloride 0 9 % bolus 2,000 mL 0 mL Intravenous Stopped Yarely Villavicencio RN      06/13/2018 1512 sodium chloride 0 9 % bolus 2,000 mL 2,000 mL Intravenous Michael 37 Yarely Villavicencio RN      06/13/2018 1927 sodium chloride 0 9 % bolus 1,000 mL 0 mL Intravenous Stopped Yarely Villavicencio RN      06/13/2018 1818 sodium chloride 0 9 % bolus 1,000 mL 1,000 mL Intravenous New Bag Yarely Villavicencio RN      06/13/2018 2007 vancomycin (VANCOCIN) IVPB (premix) 1,000 mg   Intravenous Canceled Entry Yarely Villavicencio RN      06/13/2018 2007 cefepime (MAXIPIME) 2 g/50 mL dextrose IVPB   Intravenous Canceled Entry Yarely Villavicencio RN      06/13/2018 2055 sodium chloride 0 9 % bolus 1,000 mL 0 mL Intravenous Stopped Yarely Villavicencio RN      06/13/2018 2017 sodium chloride 0 9 % bolus 1,000 mL 1,000 mL Intravenous New [address] [facility] Donny Mohr RN      06/13/2018 2310 hydrocortisone sodium succinate (PF) (Solu-CORTEF) injection 100 mg 100 mg Intravenous Given Yarely Villavicencio RN      06/13/2018 2352 PHENobarbital 65 mg/mL injection 130 mg 130 mg Intravenous Given Aster Hernandez RN      06/14/2018 0045 multi-electrolyte (ISOLYTE-S PH 7 4 equivalent) IV solution 125 mL/hr Intravenous New Bag Dre Thompson RN           Past Medical/Surgical History:    Active Ambulatory Problems     Diagnosis Date Noted    Colitis 10/22/2016    Spinal stenosis of cervical region 12/07/2016    Hypertension 12/07/2016    Pericarditis 12/07/2016    Coronary artery disease involving native coronary artery of native heart 12/07/2016    Leukocytosis 12/07/2016    Asthma 10/19/2013    Atherosclerosis of coronary artery 06/06/2016    COPD with exacerbation ([email or web address] ) 01/06/2017    Dyslipidemia 10/19/2013    [facility] 05/15/2018    Alcohol abuse 05/15/2018    Tobacco abuse 05/15/2018    Alcohol dependence with withdrawal ([email or web address] ) 05/17/2018    Acute respiratory insufficiency 05/19/2018    Agitation 05/19/2018    Heart failure, systolic, due to idiopathic cardiomyopathy ([facility] ) 05/21/2018    Ileostomy in place ([email or web address] ) 05/23/2018    Moderate protein-calorie malnutrition ([email or web address] ) 05/23/2018    Anemia 05/24/2018    Hypocalcemia 05/24/2018    Perforation of colon ([email or web address] )      Resolved Ambulatory Problems     Diagnosis Date Noted    Shock ([email or web address] ) 05/15/2018    Lactic acidosis 05/15/2018    High anion gap metabolic acidosis 50/95/8322    Transaminitis 05/15/2018    Elevated troponin 05/15/2018    Hypokalemia 05/15/2018    Hyponatremia 05/15/2018    Bronchospasm, acute 05/19/2018    Free intraperitoneal air 05/15/2018     Past Medical History:   Diagnosis Date    Asthma     Cardiac disease     Colitis     Colon polyps     COPD (chronic obstructive pulmonary disease) ([email or web address] )     Coronary artery disease     Diverticulitis     Hyperlipidemia     Hypertension     Myocardial infarction ([facility] )     Perforation of colon (HCC)     Ulcerative colitis ([email or web address] )        Admitting Diagnosis: Dehydration [E86 0]  Hypotension [I95 9]  Perforation of colon (HCC) [K63 1]  Acute renal failure, unspecified acute renal failure type ([facility] ) [N17 9]  Leukocytosis, unspecified type [D72 829]  Presence of ileostomy ([facility] ) [Z93 2]    Age/Sex: 66 y o  male        Assessment and Plan            Problem List             Colitis      Spinal stenosis of cervical region      Hypertension      Pericarditis      Coronary artery disease involving native coronary artery of native heart      Leukocytosis      Asthma      Atherosclerosis of coronary artery      COPD with exacerbation ([facility] )      Overview Signed 2/3/2018  2:00 PM by Marci Sarabia MD        Transitioned From: Chronic obstructive pulmonary disease            Dyslipidemia      Acute kidney failure (HCC)      Alcohol abuse      Tobacco abuse      Alcohol dependence with withdrawal ([facility] )      Acute respiratory insufficiency      Agitation      Heart failure, systolic, due to idiopathic cardiomyopathy ([facility] )      Presence of ileostomy ([facility] )      Moderate protein-calorie malnutrition ([facility] )      Malnutrition Findings:      BMI Findings: Body mass index is 25 82 kg/m²             Anemia      Hypocalcemia      Perforation of colon ([facility])        1  Hypotension, systolics in between 13M and 80s  Likely secondary to volume depletion as ascertain by exam and IVC compressibility on ultrasound  Unlikely to be infectious given his negative procalcitonin  Initiate aggressive IV fluid resuscitation with initial bolus, patient has received 4 L at this point and is still examining and is volume contracted  The patient did receive steroids while he was admitted recently and adrenal insufficiency cannot be completely ruled out  Continue to monitor clinically, initially patient was asymptomatic, but at the time of my exam he was becoming increasingly confused  Will continue to monitor off of antibiotics at and pressors at this time      2  Intermittent altered mental status, acutely tremulous appearing, patient pulling out IV and removing EKG and pulse oximetry  Patient increasing leak confused, stating that he thinks it is 2007 or 2008    Additionally patient asking where his car is   When I explained to him that he was brought in via EMS, and likely his car was at home he appeared to be somewhat confused by this initially and then was able to understand  Patient has a rather extensive alcohol history, but he is adamant in his denial that he has had any alcohol to drink since he was most recently admitted to the hospital at the beginning of the month  An ethanol level was ordered to verify the patient's statements of abstinence      3   Leukocytosis, likely secondary to volume contraction and hemoconcentration, but infectious etiology cannot completely be ruled out secondary to his poor overall hygiene and habitus  Patient has numerous sites for possible infection inclusive of a midline incision, diverting colostomy, overall poor hygiene with numerous small abrasions, left lower lobe consolidation  Notably on physical exam there is a biphasic wheezing notable best at the apices bilaterally      4   History of hypertension, holding all antihypertensive medications at this time secondary to hypotension     5  Alcohol abuse, chronic condition for the patient, patient adamantly stating that he has been abstinent from alcohol since he was admitted to the hospital at the beginning of the month  An ethanol level has been drawn to confirm this      6   A KI on CKD, creatinine 3 37 today from a stable baseline of approximately 0 7-0 8  Avoid nephrotoxin medications, aggressive IV fluid resuscitation as this is likely pre renal in etiology    Continue to monitor closely and fluid resuscitated as indicated       Code Status: Level 1 - Full Code    VTE Pharmacologic Prophylaxis: Heparin   VTE Mechanical Prophylaxis: sequential compression device    Admission Status: INPATIENT         Admission Orders:  6/13/18 AT 2107 ADMIT INPATIENT TO ICU  CARDIO PULM MONITORING  VS PER ICU Q1hR    Up with assistance  FALL PRECAUTUIONS  SCD    NPO    Continuous IV Infusions:   IVF norepinephrine (LEVOPHED) 4 mg (STANDARD CONCENTRATION) IV in sodium chloride 0 9% 250 mL   TITRATE MAP > 65    multi-electrolyte 125 mL/hr Last Rate: 125 mL/hr (06/14/18 0856)   phenylephine  TITRATE MAP > 65  mcg/min Last Rate: Stopped (06/14/18 1106)     Scheduled Meds:   Current Facility-Administered Medications:  EMS replenish medication  Does not apply Once Rogmarli Jews, DO    albuterol 2 puff Inhalation Q4H PRN Wemarlyan Juanal, DO    albuterol 2 5 mg Nebulization Q4H PRN Weyman Kasal, DO    amiodarone 200 mg Oral Daily Weyman Kasal, DO    aspirin 81 mg Oral Daily Weymgregorio Martinezal, DO    atorvastatin 40 mg Oral Daily With Jena Gasca, DO    budesonide-formoterol 2 puff Inhalation BID Wesona Noble, DO    [START ON 6/15/2018] cefepime 1,000 mg Intravenous Q24H Delilah Cruz PA-C    heparin (porcine) 5,000 Units Subcutaneous Q8H [address] Weyman Kasal, DO    multi-electrolyte 125 mL/hr Intravenous Continuous Sreekanth AMALIA Marmolejo Last Rate: 125 mL/hr (06/14/18 0856)   nicotine 1 patch Transdermal Daily Wesona Noble, DO    phenylephine  mcg/min Intravenous Titrated Sreekanth ABDOUL MarmolejoNP Last Rate: Stopped (06/14/18 1106)   thiamine 100 mg Intramuscular Daily Weymgregorio Martinezal, DO    tiotropium 18 mcg Inhalation Daily Wesona Noble, DO    vitamin A 30,000 Units Oral Daily Wesona Martinezal, DO        PRN Meds:      albuterol      CONSULT COLORECTAL       Colorectal  Consults Date of Service: 6/14/2018  4:35 AM     Assessment and Plan:  64yo male with heart disease and recent ileostomy creation for perforated transverse colon presents with hypotension likely from high ostomy output      ICU level of care for aggressive resuscitation  Strict ins and outs monitoring to understand how much output from ostomy daily  May need to start metamucil/lomotil/imodium to decrease output  Wet-to-dry dressing changes  Rest of care per ICU  Will follow closely with you

## 2018-06-14 NOTE — PLAN OF CARE
Problem: DISCHARGE PLANNING - CARE MANAGEMENT  Goal: Discharge to post-acute care or home with appropriate resources  INTERVENTIONS:  - Conduct assessment to determine patient/family and health care team treatment goals, and need for post-acute services based on payer coverage, community resources, and patient preferences, and barriers to discharge  - Address psychosocial, clinical, and financial barriers to discharge as identified in assessment in conjunction with the patient/family and health care team  - Arrange appropriate level of post-acute services according to patient's   needs and preference and payer coverage in collaboration with the physician and health care team  - Communicate with and update the patient/family, physician, and health care team regarding progress on the discharge plan  - Arrange appropriate transportation to post-acute venues  - Complete referral to [facility]  - Provide education and referral to HOST    Outcome: Progressing

## 2018-06-14 NOTE — CONSULTS
Transfer to [facility] [address] 66 y o  male MRN: [id number]  Unit/Bed#: ED 02 Encounter: [id number]    Physician Requesting Consult: Carlos Enrique Little DO    Reason for Consultation / Chief Complaint:   Hypotension    History of Present Illness:  Donnie Corrigan is a 66 y o  male a past medical history significant for coronary disease status post PCI to RCA  2012, recent cardiomyopathy in May 2018 thought to be secondary to either AFib with RVR or alcohol-induced cardiomyopathy, ulcerative colitis/Crohn's status post right hemicolectomy (prior), COPD, and alcohol abuse  Patient a prolonged hospital stay 5/15/2018-6/1/2018 for septic shock, acute kidney injury, hypokalemia, altered mental status, delirium tremors, new onset atrial fibrillation, intubation secondary to severe bronchospasm and COPD exacerbation, and exlap secondary to free air with identification perforation of transverse colon with ischemic bowel and subsequent ileo colectomy, ileocolonic anastomosis and diverting loop ileostomy and fascial closure  Patient was sent to the hospital today after the visiting nurse found patient to be hypotensive with systolic blood pressures in the 60s  On presentation to the emergency department the patient offered no complaints other than being slightly tired  He denied shortness of breath, fevers, chills, nausea/vomiting  In the ED the was found to have a normal lactate level, acute kidney injury with Cr 4 53 (baseline 0 7), CT scan showed left lower lobe consolidation, gallstones within the gallbladder without pericholecystic changes, stable bilateral nonobstructing intrarenal calculi, no bowel obstruction and no abscess  The patient received 4 L of IV fluid with some mild transient response in his blood pressure  Phenobarbital x 1 in ED for DTs  Upon my examination the patient was found to be confused, tremulous, with visual  hallucinations and SBP 80/ 40    Patient is unable to provide a accurate history  Case discussed with the SOD resident and will transfer to critical care service  History obtained from chart review and the patient  Past Medical History:  Past Medical History:   Diagnosis Date    Asthma     Cardiac disease     Colitis     Colon polyps     [facility] obstructive [facility] disease) [facility]     Coronary artery disease     Diverticulitis     Hyperlipidemia     Hypertension     Myocardial infarction ([facility] )     Perforation of colon ([facility] )     Ulcerative colitis ([place] [address] )        Past Surgical History:  Past Surgical History:   Procedure Laterality Date    ANGIOPLASTY      COLON SURGERY      COLONOSCOPY N/A 10/24/2016    Procedure: COLONOSCOPY;  Surgeon: Justa Stacy MD;  Location: [facility]; Service:     CORONARY ANGIOPLASTY WITH STENT PLACEMENT      ESOPHAGOGASTRODUODENOSCOPY N/A 10/24/2016    Procedure: ESOPHAGOGASTRODUODENOSCOPY (EGD); Surgeon: Justa Stacy MD;  Location: [facility]; Service:     EXPLORATORY LAPAROTOMY W/ BOWEL RESECTION N/A 5/22/2018    Procedure: EXPLORATORY LAPAROTOMY, ILIOCOLECTOMY, ILIOCOLONIC ANASTAMOSIS, LOOP ILIOSTOMY, REPAIR OF SEROSAL TEAR, EXTENSIVE LYSIS OF ADHESIONS, WOUND VAC PLACEMENT;  Surgeon: Justa Stacy MD;  Location: [facility];  Service: Colorectal    MS COLONOSCOPY FLX DX W/COLLJ SPEC WHEN PFRMD N/A 2/6/2018    Procedure: COLONOSCOPY;  Surgeon: Justa Stacy MD;  [facility]: [facility]; Service: Colorectal    TONSILLECTOMY         Past Family History:  History reviewed  No pertinent family history  Social History:  History   Smoking Status    Current Every Day Smoker    Types: Cigars   Smokeless Tobacco    Never Used     History   Alcohol Use    Yes     Comment: kenji occassionally     History   Drug Use No     Marital Status:   Exercise History: TUAN due to confusion    Home Medications:   Prior to Admission medications    Medication Sig Start Date End Date Taking? Authorizing Provider   acetaminophen (TYLENOL) 650 mg CR tablet Take 1 tablet (650 mg total) by mouth every 8 (eight) hours as needed for mild pain 6/1/18  Yes Luciano Echevarria MD   albuterol (2 5 mg/3 mL) 0 083 % nebulizer solution Take 3 mL (2 5 mg total) by nebulization every 4 (four) hours as needed for wheezing as needed for wheezing 6/1/18  Yes Rudolph Sinclair MD   amiodarone 200 mg tablet Take 1 tablet (200 mg total) by mouth daily 6/6/18  Yes Rudolph Sinclair MD   aspirin 81 mg chewable tablet Chew 1 tablet (81 mg total) daily 6/1/18  Yes Rudolph Sinclair MD   atorvastatin (LIPITOR) 40 mg tablet Take 1 tablet (40 mg total) by mouth daily with dinner for 30 days 6/1/18 7/1/18 Yes Luciano Echevarria MD   gabapentin (NEURONTIN) 400 mg capsule Take 1 capsule (400 mg total) by mouth 3 (three) times a day 6/1/18  Yes Rudolph Sinclair MD   glucose blood test strip Use as instructed 6/8/18  Yes Luciano Echevarria MD   lisinopril (ZESTRIL) 2 5 mg tablet Take 1 tablet (2 5 mg total) by mouth daily 6/1/18  Yes Rudolph Sinclair MD   lisinopril (ZESTRIL) 2 5 mg tablet Take 1 tablet (2 5 mg total) by mouth daily 6/2/18  Yes Sisi Myers MD   metoprolol succinate (TOPROL-XL) 50 mg 24 hr tablet Take 1 tablet (50 mg total) by mouth daily 6/1/18  Yes Luciano Echevarria MD   Mometasone Furo-Formoterol Fum (DULERA) 200-5 MCG/ACT AERO Inhale 2 puffs every 12 (twelve) hours 6/1/18  Yes Rudolph Sinclair MD   Nebulizers (AERONEB GO NEBULIZER HANDSET) MISC by Does not apply route 2 (two) times a day as needed (wheezing) 6/8/18  Yes Rudolph Sinclair MD   nicotine (NICODERM CQ) 7 mg/24hr TD 24 hr patch Place 1 patch on the skin every 24 hours 6/8/18  Yes Luciano Echevarria MD   Tiotropium Bromide Monohydrate (SPIRIVA RESPIMAT) 2 5 MCG/ACT AERS Inhale 1 Act (2 5 mcg total) daily 6/1/18  Yes Luciano Echevarria MD   VENTOLIN  (90 Base) MCG/ACT inhaler Inhale 2 puffs every 4 (four) hours as needed for wheezing or shortness of breath 6/1/18  Yes Luciano Echevarria MD   vitamin A 10,000 units capsule Take 3 capsules (30,000 Units total) by mouth daily 18  Yes Luciano Echevarria MD   amiodarone 200 mg tablet Take 1 tablet (200 mg total) by mouth 2 (two) times a day with meals for 4 days 18  Estella Gowers, MD       Inpatient Medications:  Scheduled Meds:  Current Facility-Administered Medications:  EMS replenish medication  Does not apply Once Shimon Castillo DO   hydrocortisone sodium succinate 100 mg Intravenous Once AMALIA Garduno     Continuous Infusions:   PRN Meds:       Allergies: Allergies   Allergen Reactions    Other      Brown cloth band aids          ROS:   Review of Systems   Constitutional: Positive for fatigue  HENT: Negative  Respiratory: Positive for shortness of breath  Negative for chest tightness  Cardiovascular: Negative for chest pain  Gastrointestinal: Positive for abdominal pain  Endocrine: Negative  Genitourinary: Negative  Musculoskeletal: Negative  Allergic/Immunologic: Negative  Neurological: Negative  Hematological: Negative  Psychiatric/Behavioral: Positive for hallucinations  Vitals:  Vitals:    18 1913 18 2019 18 2100   BP: (!) 67/44 (!) 70/45 (!) 83/47 (!) 86/55   BP Location: Right arm Right arm Right arm Right arm   Pulse: 71 81 72 92   Resp: 20 20 20 20   Temp:       TempSrc:       SpO2: 95% 95% 95% 95%   Weight:         Temperature:   Temp (24hrs), Av 6 °F (37 6 °C), Min:99 6 °F (37 6 °C), Max:99 6 °F (37 6 °C)    Current Temperature: 99 6 °F (37 6 °C)    Weights:   IBW: -88 kg  Body mass index is 25 82 kg/m²  SpO2: SpO2: 94 %     Physical Exam:  Physical Exam   Constitutional: He appears ill  No distress  Patient disheveled in appearance   HENT:   Head: Normocephalic and atraumatic  Mouth/Throat: Uvula is midline  Mucous membranes are dry  Eyes: Pupils are equal, round, and reactive to light  Neck: No JVD present     Cardiovascular: Normal rate, regular rhythm and normal heart sounds  Pulses:       Dorsalis pedis pulses are 1+ on the right side, and 1+ on the left side  Pulmonary/Chest: He has decreased breath sounds  He has wheezes in the right lower field  Abdominal: There is tenderness in the left upper quadrant and left lower quadrant  There is guarding  ileostomy in place  Stoma site pink   Neurological:   Patient oriented 1-2, tremulous, visual hallucinations, a he tells me that there a "cat on the wall"   Skin: Skin is warm  No cyanosis  Nails show no clubbing  Multiple healing abrasions       Labs:    Results from last 7 days  Lab Units 06/13/18 2022 06/13/18  1512   WBC Thousand/uL 16 95* 19 56*   HEMOGLOBIN g/dL 9 4* 10 1*   HEMATOCRIT % 30 4* 32 5*   PLATELETS Thousands/uL 336 399*   NEUTROS PCT % 80*  --    MONOS PCT % 6  --    MONO PCT MAN %  --  3*      Results from last 7 days  Lab Units 06/13/18 2022 06/13/18  1512   SODIUM mmol/L 138 133*   POTASSIUM mmol/L 4 7 5 7*   CHLORIDE mmol/L 106 97*   CO2 mmol/L 22 26   BUN mg/dL 37* 45*   CREATININE mg/dL 3 37* 4 53*   CALCIUM mg/dL 7 4* 8 5   TOTAL PROTEIN g/dL  --  6 8   BILIRUBIN TOTAL mg/dL  --  0 49   ALK PHOS U/L  --  124*   ALT U/L  --  18   AST U/L  --  26   GLUCOSE RANDOM mg/dL 65 93                    Results from last 7 days  Lab Units 06/13/18  1711   LACTIC ACID mmol/L 1 3       0  Lab Value Date/Time   TROPONINI <0 02 06/13/2018 1512       Imaging:  I have personally reviewed pertinent reports  and I have personally reviewed pertinent films in PACS LLL Consolidation, no abscess or free air, stable b/l non obstructing intrarenal calculi and complex left renal cyst    EKG:No ischemic ST segment changes  Micro:  Procedure Component Value - Date/Time   Urine culture [[id number]] Collected: 06/13/18 2024   Lab Status: In process Specimen: Urine from Urine, Clean Catch Updated: 06/13/18 2144   Blood culture #1 [[id number]] Collected: 06/13/18 1957   Lab Status:  In process Specimen: Blood from Arm, Left Updated: 06/13/18 2000   Blood culture #2 [[id number]] Collected: 06/13/18 1957   Lab Status:  In process Specimen: Blood from Arm, Right Updated: 06/13/18 2000       ______________________________________________________________________    Assessment and Plan:   Hypotension (differentials possible adrenal insufficiency- prednisone discontinued 6/9 vs intravascular volume depletion secondary to ileostomy output)  Altered mental status  Acute kidney injury  History of alcohol abuse  History of atrial fibrillation, currently normal sinus rhythm on eliquis as OP  COPD with no acute exacerbation  Chronic systolic heart failure, EF 25% with cardiomyopathy        Neuro:   · CIWA scale, consider PRN ativan for >8  · Monitor for TYLER  · Check alcohol level now  · Serial neuro exams  · Hold Gabapentin with DEBBY    CV:   · Hypotension: question possible component of adrenal insufficiency with recent steroid use vs intravascular volume depletion (possible related to high output from ileostomy)  · Check Cortisol level  · Administer Hydrocortisone 100 mg IV x 1  · Repeat ECHO  · Trend troponin  · Hold BBlocker  · Hold ACE I  Lung:   · Continue Nebs  · FiO2 for SaO2 >90%  GI:   · General surgery consult for wound management  · NPO  F/E/N:   · Continue IV fluids at 125 mL/hour  · Repeat BMP in the AM  :   · Place hernandez catheter for accurate I&O  · Strict I&Os  · Consider checking FeNA  · Avoid nephrotoxic agents  ID:   · Cultures pending  · At this time decision made to hold antibiotics in setting of afebrile patient and no increase in procalcitonin from prior labs or   · Repeat procalcitonin in the AM  Heme:   · Subcutaneous heparin for VTE prophylaxis  · Transfuse for Hgb less than 7  Endo:   · Check random cortisol level  · Trial hydrocortisone 100 mg IV x1 dose  Msk/Skin:   · Local wound care  · Abdominal wound dressing changes per surgical service  Disposition:   Transfer patient to medical critical care service secondary to persistent hypotension, and altered MS    Counseling / Coordination of Care  0 mins of critical care time  ______________________________________________________________________    VTE Pharmacologic Prophylaxis: Heparin  VTE Mechanical Prophylaxis: sequential compression device    Invasive lines and devices: Invasive Devices     Peripheral Intravenous Line            Peripheral IV 06/13/18 Left Antecubital less than 1 day    Peripheral IV 06/13/18 Right Antecubital less than 1 day          Drain            Colostomy  RUQ 22 days                Code Status: Prior  POA:    POLST:      Given critical illness, patient length of stay will require greater than two midnights  Portions of the record may have been created with voice recognition software  Occasional wrong word or "sound a like" substitutions may have occurred due to the inherent limitations of voice recognition software  Read the chart carefully and recognize, using context, where substitutions have occurred        AMALIA Randolph    Consults

## 2018-06-14 NOTE — SOCIAL WORK
Met with the patient to complete assessment and explain role of CM  The patient lives in his multilevel home which has no KAREN  Bedrooms and bathroom are on second floor but he has made first floor set up  Patient has commode, rollator and wheeled walker, canes and home oxygen from [employer]  He is open to services of [place] [address] and [facility] referral was made as patient's preference  Patient has no Advance Directive and his daughter, Peterson Bustillos, is primary contact, cell # [phone]  Patient's daughters and her 3 children ages 14,19 and 24 live with him  Daughter helps with ileostomy care and transportation  She works FT and patient has been able to stay alone at home  Patient has prescription coverage and uses [facility]  Asked patient about his ETOH and he reports that he has not been drinking since his recent recent from [facility]  Provided education and information about HOST assessment and patient declines referral    CM reviewed d/c planning process including the following: identifying help at home, patient preference for d/c planning needs, Discharge Lounge, [facility] Meds to Bed program, availability of treatment team to discuss questions or concerns patient and/or family may have regarding understanding medications and recognizing signs and symptoms once discharged  CM also encouraged patient to follow up with all recommended appointments after discharge  Patient advised of importance for patient and family to participate in managing patients medical well being  Patient/caregiver received discharge checklist  Content reviewed  Patient/caregiver encouraged to participate in discharge plan of care prior to discharge home

## 2018-06-15 ENCOUNTER — APPOINTMENT (INPATIENT)
Dept: RADIOLOGY | Facility: HOSPITAL | Age: 58
DRG: 720 | End: 2018-06-15
Payer: COMMERCIAL

## 2018-06-15 LAB
ANION GAP SERPL CALCULATED.3IONS-SCNC: 9 MMOL/L (ref 4–13)
BASOPHILS # BLD AUTO: 0.01 THOUSANDS/ΜL (ref 0–0.1)
BASOPHILS NFR BLD AUTO: 0 % (ref 0–1)
BUN SERPL-MCNC: 19 MG/DL (ref 5–25)
CALCIUM SERPL-MCNC: 7.9 MG/DL (ref 8.3–10.1)
CHLORIDE SERPL-SCNC: 105 MMOL/L (ref 100–108)
CO2 SERPL-SCNC: 25 MMOL/L (ref 21–32)
CREAT SERPL-MCNC: 1.21 MG/DL (ref 0.6–1.3)
EOSINOPHIL # BLD AUTO: 0.01 THOUSAND/ΜL (ref 0–0.61)
EOSINOPHIL NFR BLD AUTO: 0 % (ref 0–6)
ERYTHROCYTE [DISTWIDTH] IN BLOOD BY AUTOMATED COUNT: 16 % (ref 11.6–15.1)
GFR SERPL CREATININE-BSD FRML MDRD: 67 ML/MIN/1.73SQ M
GLUCOSE SERPL-MCNC: 111 MG/DL (ref 65–140)
HCT VFR BLD AUTO: 31.6 % (ref 36.5–49.3)
HGB BLD-MCNC: 9.7 G/DL (ref 12–17)
IMM GRANULOCYTES # BLD AUTO: 0.09 THOUSAND/UL (ref 0–0.2)
IMM GRANULOCYTES NFR BLD AUTO: 1 % (ref 0–2)
LYMPHOCYTES # BLD AUTO: 0.95 THOUSANDS/ΜL (ref 0.6–4.47)
LYMPHOCYTES NFR BLD AUTO: 8 % (ref 14–44)
MAGNESIUM SERPL-MCNC: 1.8 MG/DL (ref 1.6–2.6)
MCH RBC QN AUTO: 30.9 PG (ref 26.8–34.3)
MCHC RBC AUTO-ENTMCNC: 30.7 G/DL (ref 31.4–37.4)
MCV RBC AUTO: 101 FL (ref 82–98)
MONOCYTES # BLD AUTO: 0.79 THOUSAND/ΜL (ref 0.17–1.22)
MONOCYTES NFR BLD AUTO: 6 % (ref 4–12)
MRSA NOSE QL CULT: NORMAL
NEUTROPHILS # BLD AUTO: 10.65 THOUSANDS/ΜL (ref 1.85–7.62)
NEUTS SEG NFR BLD AUTO: 85 % (ref 43–75)
NRBC BLD AUTO-RTO: 0 /100 WBCS
PHOSPHATE SERPL-MCNC: 2.4 MG/DL (ref 2.7–4.5)
PLATELET # BLD AUTO: 337 THOUSANDS/UL (ref 149–390)
PMV BLD AUTO: 10.1 FL (ref 8.9–12.7)
POTASSIUM SERPL-SCNC: 4 MMOL/L (ref 3.5–5.3)
PROCALCITONIN SERPL-MCNC: 0.34 NG/ML
RBC # BLD AUTO: 3.14 MILLION/UL (ref 3.88–5.62)
SODIUM SERPL-SCNC: 139 MMOL/L (ref 136–145)
VANCOMYCIN SERPL-MCNC: 11.7 UG/ML
WBC # BLD AUTO: 12.5 THOUSAND/UL (ref 4.31–10.16)

## 2018-06-15 PROCEDURE — 84100 ASSAY OF PHOSPHORUS: CPT | Performed by: PHYSICIAN ASSISTANT

## 2018-06-15 PROCEDURE — 85025 COMPLETE CBC W/AUTO DIFF WBC: CPT | Performed by: PHYSICIAN ASSISTANT

## 2018-06-15 PROCEDURE — 80202 ASSAY OF VANCOMYCIN: CPT | Performed by: PHYSICIAN ASSISTANT

## 2018-06-15 PROCEDURE — 94640 AIRWAY INHALATION TREATMENT: CPT

## 2018-06-15 PROCEDURE — 71045 X-RAY EXAM CHEST 1 VIEW: CPT

## 2018-06-15 PROCEDURE — 83735 ASSAY OF MAGNESIUM: CPT | Performed by: PHYSICIAN ASSISTANT

## 2018-06-15 PROCEDURE — 94760 N-INVAS EAR/PLS OXIMETRY 1: CPT

## 2018-06-15 PROCEDURE — 80048 BASIC METABOLIC PNL TOTAL CA: CPT | Performed by: PHYSICIAN ASSISTANT

## 2018-06-15 PROCEDURE — 84145 PROCALCITONIN (PCT): CPT | Performed by: PHYSICIAN ASSISTANT

## 2018-06-15 RX ORDER — TRAMADOL HYDROCHLORIDE 50 MG/1
50 TABLET ORAL EVERY 6 HOURS PRN
Status: DISCONTINUED | OUTPATIENT
Start: 2018-06-15 | End: 2018-06-18

## 2018-06-15 RX ORDER — FOLIC ACID 1 MG/1
1 TABLET ORAL DAILY
Status: DISCONTINUED | OUTPATIENT
Start: 2018-06-15 | End: 2018-06-28 | Stop reason: HOSPADM

## 2018-06-15 RX ORDER — SODIUM CHLORIDE 9 MG/ML
100 INJECTION, SOLUTION INTRAVENOUS CONTINUOUS
Status: DISCONTINUED | OUTPATIENT
Start: 2018-06-15 | End: 2018-06-19

## 2018-06-15 RX ORDER — ONDANSETRON 2 MG/ML
4 INJECTION INTRAMUSCULAR; INTRAVENOUS EVERY 6 HOURS PRN
Status: DISCONTINUED | OUTPATIENT
Start: 2018-06-15 | End: 2018-06-28 | Stop reason: HOSPADM

## 2018-06-15 RX ORDER — SODIUM CHLORIDE, SODIUM GLUCONATE, SODIUM ACETATE, POTASSIUM CHLORIDE, MAGNESIUM CHLORIDE, SODIUM PHOSPHATE, DIBASIC, AND POTASSIUM PHOSPHATE .53; .5; .37; .037; .03; .012; .00082 G/100ML; G/100ML; G/100ML; G/100ML; G/100ML; G/100ML; G/100ML
1000 INJECTION, SOLUTION INTRAVENOUS ONCE
Status: DISCONTINUED | OUTPATIENT
Start: 2018-06-15 | End: 2018-06-15

## 2018-06-15 RX ORDER — THIAMINE MONONITRATE (VIT B1) 100 MG
100 TABLET ORAL DAILY
Status: DISCONTINUED | OUTPATIENT
Start: 2018-06-15 | End: 2018-06-28 | Stop reason: HOSPADM

## 2018-06-15 RX ORDER — LOPERAMIDE HYDROCHLORIDE 2 MG/1
2 CAPSULE ORAL 2 TIMES DAILY
Status: DISCONTINUED | OUTPATIENT
Start: 2018-06-15 | End: 2018-06-17

## 2018-06-15 RX ADMIN — ATORVASTATIN CALCIUM 40 MG: 40 TABLET, FILM COATED ORAL at 16:46

## 2018-06-15 RX ADMIN — Medication 400 MG: at 16:47

## 2018-06-15 RX ADMIN — SODIUM CHLORIDE, SODIUM GLUCONATE, SODIUM ACETATE, POTASSIUM CHLORIDE, MAGNESIUM CHLORIDE, SODIUM PHOSPHATE, DIBASIC, AND POTASSIUM PHOSPHATE 125 ML/HR: .53; .5; .37; .037; .03; .012; .00082 INJECTION, SOLUTION INTRAVENOUS at 03:21

## 2018-06-15 RX ADMIN — SODIUM CHLORIDE, SODIUM GLUCONATE, SODIUM ACETATE, POTASSIUM CHLORIDE, MAGNESIUM CHLORIDE, SODIUM PHOSPHATE, DIBASIC, AND POTASSIUM PHOSPHATE 125 ML/HR: .53; .5; .37; .037; .03; .012; .00082 INJECTION, SOLUTION INTRAVENOUS at 11:49

## 2018-06-15 RX ADMIN — Medication 30000 UNITS: at 08:32

## 2018-06-15 RX ADMIN — CEFEPIME HYDROCHLORIDE 1000 MG: 1 INJECTION, SOLUTION INTRAVENOUS at 08:45

## 2018-06-15 RX ADMIN — AMIODARONE HYDROCHLORIDE 200 MG: 200 TABLET ORAL at 08:31

## 2018-06-15 RX ADMIN — ONDANSETRON 4 MG: 2 INJECTION, SOLUTION INTRAMUSCULAR; INTRAVENOUS at 03:21

## 2018-06-15 RX ADMIN — NICOTINE 1 PATCH: 14 PATCH, EXTENDED RELEASE TRANSDERMAL at 08:31

## 2018-06-15 RX ADMIN — LOPERAMIDE HYDROCHLORIDE 2 MG: 2 CAPSULE ORAL at 16:47

## 2018-06-15 RX ADMIN — CEFEPIME HYDROCHLORIDE 1000 MG: 1 INJECTION, SOLUTION INTRAVENOUS at 19:36

## 2018-06-15 RX ADMIN — HEPARIN SODIUM 5000 UNITS: 5000 INJECTION, SOLUTION INTRAVENOUS; SUBCUTANEOUS at 21:42

## 2018-06-15 RX ADMIN — HYDROCORTISONE SODIUM SUCCINATE 25 MG: 100 INJECTION, POWDER, FOR SOLUTION INTRAMUSCULAR; INTRAVENOUS at 21:41

## 2018-06-15 RX ADMIN — SODIUM CHLORIDE 60 ML/HR: 0.9 INJECTION, SOLUTION INTRAVENOUS at 09:59

## 2018-06-15 RX ADMIN — ALBUTEROL SULFATE 2.5 MG: 2.5 SOLUTION RESPIRATORY (INHALATION) at 03:32

## 2018-06-15 RX ADMIN — BUDESONIDE AND FORMOTEROL FUMARATE DIHYDRATE 2 PUFF: 160; 4.5 AEROSOL RESPIRATORY (INHALATION) at 18:11

## 2018-06-15 RX ADMIN — HEPARIN SODIUM 5000 UNITS: 5000 INJECTION, SOLUTION INTRAVENOUS; SUBCUTANEOUS at 13:27

## 2018-06-15 RX ADMIN — OXYCODONE HYDROCHLORIDE 5 MG: 5 TABLET ORAL at 03:14

## 2018-06-15 RX ADMIN — ASPIRIN 81 MG 81 MG: 81 TABLET ORAL at 08:31

## 2018-06-15 RX ADMIN — HEPARIN SODIUM 5000 UNITS: 5000 INJECTION, SOLUTION INTRAVENOUS; SUBCUTANEOUS at 05:24

## 2018-06-15 RX ADMIN — TRAMADOL HYDROCHLORIDE 50 MG: 50 TABLET, FILM COATED ORAL at 09:55

## 2018-06-15 RX ADMIN — Medication 100 MG: at 09:56

## 2018-06-15 RX ADMIN — HYDROCORTISONE SODIUM SUCCINATE 25 MG: 100 INJECTION, POWDER, FOR SOLUTION INTRAMUSCULAR; INTRAVENOUS at 05:25

## 2018-06-15 RX ADMIN — HYDROCORTISONE SODIUM SUCCINATE 25 MG: 100 INJECTION, POWDER, FOR SOLUTION INTRAMUSCULAR; INTRAVENOUS at 13:27

## 2018-06-15 RX ADMIN — FOLIC ACID 1 MG: 1 TABLET ORAL at 09:56

## 2018-06-15 RX ADMIN — TIOTROPIUM BROMIDE 18 MCG: 18 CAPSULE ORAL; RESPIRATORY (INHALATION) at 08:33

## 2018-06-15 RX ADMIN — LOPERAMIDE HYDROCHLORIDE 2 MG: 2 CAPSULE ORAL at 09:56

## 2018-06-15 RX ADMIN — TRAMADOL HYDROCHLORIDE 50 MG: 50 TABLET, FILM COATED ORAL at 16:51

## 2018-06-15 RX ADMIN — Medication 400 MG: at 09:56

## 2018-06-15 RX ADMIN — BUDESONIDE AND FORMOTEROL FUMARATE DIHYDRATE 2 PUFF: 160; 4.5 AEROSOL RESPIRATORY (INHALATION) at 08:33

## 2018-06-15 NOTE — PROGRESS NOTES
Soni Worthington, the house resident on call, and notified that pt is refusing to have hernandez removed secondary to pain  Per Chloe Domingo, ok to leave hernandez until pain improves and then hernandez must be removed

## 2018-06-15 NOTE — PROGRESS NOTES
Progress Note - General Surgery   Radha Kirkland 66 y o  male MRN: [id number]  Unit/Bed#: [facility] 503-01 Encounter: [id number]    Assessment:  58yM presenting with hypotension  Recent ileostomy created for perforated transverse colon  Consulted for high ileostomy output  - Ileostomy output 2600 for last 24 hours  - Hypotension resolved    Plan:  Cardiac diet  Cont IVF at 125  Will start lomotil or imodium   Continue to track I/O  Wet to dry dressings    Subjective/Objective   Subjective: No acute events overnight  Objective:     Blood pressure 109/60, pulse 93, temperature 100 °F (37 8 °C), resp  rate 18, height 5' 7" (1 702 m), weight 70 3 kg (154 lb 15 7 oz), SpO2 95 %  ,Body mass index is 24 27 kg/m²  Intake/Output Summary (Last 24 hours) at 06/15/18 0617  Last data filed at 06/15/18 0532   Gross per 24 hour   Intake          4412 91 ml   Output             3750 ml   Net           662 91 ml       Invasive Devices     Peripheral Intravenous Line            Peripheral IV 06/14/18 Right Forearm less than 1 day          Drain            Colostomy  RUQ 23 days    Urethral Catheter Temperature probe 16 Fr  1 day                Physical Exam:     Gen: NAD, AAOx3  CV: RRR  Pulm: no resp distress  Abd: Soft, non-distended, tender  Opening of incision healing well  Ileostomy in place  Lab, Imaging and other studies:I have personally reviewed pertinent lab results    , CBC: No results found for: WBC, HGB, HCT, MCV, PLT, ADJUSTEDWBC, MCH, MCHC, RDW, MPV, NRBC, CMP: No results found for: NA, K, CL, CO2, ANIONGAP, BUN, CREATININE, GLUCOSE, CALCIUM, AST, ALT, ALKPHOS, PROT, ALBUMIN, BILITOT, EGFR  VTE Pharmacologic Prophylaxis: Heparin  VTE Mechanical Prophylaxis: sequential compression device

## 2018-06-15 NOTE — PROGRESS NOTES
Transfer Note - Critical Care   Donnie Corrigan 66 y o  male MRN: [id number]  Unit/Bed#: [facility] 515-01 Encounter: [id number]    Assessment:   Principal Problem:    Hypotension  Active Problems:    LLL Pneumonia    Encephalopathy    Acute kidney failure (HCC)    Dehydration    Heart failure, systolic, due to idiopathic cardiomyopathy ([facility] )    Alcohol abuse    Tobacco abuse    Ileostomy in place ([facility] )    Sepsis ([facility] )    Plan:    - Patient is stable - not requiring inotropic or hemodynamic support   - Airway is stable / patient   - Patient is following commands appropriately  Patient will be transferred out of the ICU to the SOD service  Accepting Attending Physician is Dr Nadira Daniels      Disposition: Med Surg Tele    Chief Complaint: Hypotension      Physical Exam:   Gen:  Awake / no distress  HEENT:  Normocephalic / Atraumatic  Neck:  Trachea midline, no JVD  CV:  RRR, no murmur, rubs, or gallops  Resp:  Coarse sounds noted in all fields, equal expansion of the chest  GI:  Soft, non-tender  No distention  Ileostomy present  :  Hernandez cath present  Extremities:  Equal pulses noted in all extremities  No edema noted  Skin:  Warn, dry  Vitals:    18 2200 18 2230 18 2300 18 2310   BP: 110/70 103/65 92/56    BP Location:       Pulse: 92 94 94    Resp: 16 20 21    Temp: 100 °F (37 8 °C) 100 °F (37 8 °C) 100 °F (37 8 °C) 100 2 °F (37 9 °C)   TempSrc:    Probe   SpO2: 96% 94% 93%    Weight:       Height:                 Temperature:   Temp (24hrs), Av 9 °F (37 2 °C), Min:97 2 °F (36 2 °C), Max:100 2 °F (37 9 °C)    Current: Temperature: 100 2 °F (37 9 °C)    Weights:   IBW: 66 1 kg    Body mass index is 24 03 kg/m²    Weight (last 2 days)     Date/Time   Weight    18 0100  69 6 (153 44)    18 1505  72 6 (160)              Hemodynamic Monitoring:  N/A     Non-Invasive/Invasive Ventilation Settings:  Respiratory    Lab Data (Last 4 hours)    None         O2/Vent Data (Last 4 hours)    None              No results found for: PHART, NAM6FJP, PO2ART, TAI7NYK, Q9WXFBEV, BEART, SOURCE  SpO2: SpO2: 93 %, SpO2 Activity: SpO2 Activity: At Rest, SpO2 Device: O2 Device: Nasal cannula    Intake and Outputs:  I/O       06/13 0701 - 06/14 0700 06/14 0701 - 06/15 0700    I V  (mL/kg) 689 5 (9 9) 2871 3 (41 3)    IV Piggyback 5300 350    Total Intake(mL/kg) 5989 5 (86 1) 3221 3 (46 3)    Urine (mL/kg/hr) 1075 800 (0 5)    Stool 550 1600    Total Output 1625 2400    Net +4364 5 +821 3              UOP: ~80 cc/hour   Nutrition:        Diet Orders            Start     Ordered    06/14/18 0226  Diet Cardiac; Cardiac Step 1  Diet effective now     Question Answer Comment   Diet Type Cardiac    Cardiac Cardiac Step 1    RD to adjust diet per protocol?  Yes        06/14/18 0226            Labs:     Results from last 7 days  Lab Units 06/14/18 0446 06/13/18 2022 06/13/18  1512   WBC Thousand/uL 22 33* 16 95* 19 56*   HEMOGLOBIN g/dL 9 5* 9 4* 10 1*   HEMATOCRIT % 31 7* 30 4* 32 5*   PLATELETS Thousands/uL 417* 336 399*   NEUTROS PCT %  --  80*  --    MONOS PCT %  --  6  --    MONO PCT MAN %  --   --  3*      Results from last 7 days  Lab Units 06/14/18 0447 06/13/18 2022 06/13/18  1512   SODIUM mmol/L 139 138 133*   POTASSIUM mmol/L 5 3 4 7 5 7*   CHLORIDE mmol/L 109* 106 97*   CO2 mmol/L 21 22 26   BUN mg/dL 30* 37* 45*   CREATININE mg/dL 2 23* 3 37* 4 53*   CALCIUM mg/dL 7 5* 7 4* 8 5   TOTAL PROTEIN g/dL  --   --  6 8   BILIRUBIN TOTAL mg/dL  --   --  0 49   ALK PHOS U/L  --   --  124*   ALT U/L  --   --  18   AST U/L  --   --  26   GLUCOSE RANDOM mg/dL 102 65 93                    Results from last 7 days  Lab Units 06/13/18  1711   LACTIC ACID mmol/L 1 3       0  Lab Value Date/Time   TROPONINI <0 02 06/13/2018 1512   TROPONINI 0 12 (H) 05/20/2018 1753   TROPONINI 0 17 (H) 05/20/2018 1505   TROPONINI 0 17 (H) 05/20/2018 1214   TROPONINI 0 29 (H) 05/19/2018 0438   TROPONINI 0 30 (H) 05/19/2018 0154   TROPONINI 0 32 (H) 05/18/2018 2229   TROPONINI 0 32 (H) 05/18/2018 1909   TROPONINI 1 15 (H) 05/16/2018 1416   TROPONINI 1 24 (H) 05/16/2018 0522   TROPONINI 1 16 (H) 05/16/2018 0209   TROPONINI 1 09 (H) 05/16/2018 0053   TROPONINI 0 85 (H) 05/15/2018 1935   TROPONINI 0 72 (H) 05/15/2018 1728   TROPONINI 0 47 (H) 05/15/2018 1304   TROPONINI 0 02 12/07/2016 1931   TROPONINI 0 04 (H) 12/07/2016 0907   TROPONINI <0 04 05/22/2015 0123       Micro:  Lab Results   Component Value Date    BLOODCX No Growth at 24 hrs  06/13/2018    BLOODCX No Growth at 24 hrs  06/13/2018    BLOODCX No Growth After 5 Days  05/15/2018       Allergies:    Allergies   Allergen Reactions    Other      Brown cloth band aids          Medications:   Scheduled Meds:  Current Facility-Administered Medications:  EMS replenish medication  Does not apply Once Imani Points, DO    acetaminophen 650 mg Oral Q6H PRN Skyla Mcgraw PA-C    albuterol 2 puff Inhalation Q4H PRN Jimmie Yenny, DO    albuterol 2 5 mg Nebulization Q4H PRN Jimmie Yenny, DO    amiodarone 200 mg Oral Daily Jimmie Yenny, DO    aspirin 81 mg Oral Daily Jimmie Yenny, DO    atorvastatin 40 mg Oral Daily With Levonia Doctor, DO    budesonide-formoterol 2 puff Inhalation BID Jimmie Yenny, DO    cefepime 1,000 mg Intravenous Q12H Jaskaran Jerome PA-C Last Rate: 1,000 mg (06/14/18 2100)   heparin (porcine) 5,000 Units Subcutaneous [facility] Jimmie Yenny, DO    hydrocortisone sodium succinate 25 mg Intravenous [facility] Jaskaran Jerome PA-C    multi-electrolyte 125 mL/hr Intravenous Continuous AMALIA Pedro Last Rate: 125 mL/hr (06/14/18 1647)   nicotine 1 patch Transdermal Daily Jimmie Yenny, DO    oxyCODONE 2 5 mg Oral Q4H PRN Skyla Mcgraw PA-C    oxyCODONE 5 mg Oral Q4H PRN Skyla Mcgraw PA-C    phenylephine  mcg/min Intravenous Titrated AMALIA Pedro Last Rate: Stopped (06/14/18 2033)   thiamine 100 mg Intravenous Daily Jaskaran Jerome PA-C    tiotropium 18 mcg Inhalation Daily Jacqualine Timothy, DO    vitamin A 30,000 Units Oral Daily Jacqualine Timothy, DO      Continuous Infusions:  multi-electrolyte 125 mL/hr Last Rate: 125 mL/hr (06/14/18 1647)   phenylephine  mcg/min Last Rate: Stopped (06/14/18 2033)     PRN Meds:    acetaminophen 650 mg Q6H PRN   albuterol 2 puff Q4H PRN   albuterol 2 5 mg Q4H PRN   oxyCODONE 2 5 mg Q4H PRN   oxyCODONE 5 mg Q4H PRN       VTE Pharmacologic Prophylaxis: Heparin  VTE Mechanical Prophylaxis: sequential compression device    Invasive lines and devices: Invasive Devices     Peripheral Intravenous Line            Peripheral IV 06/14/18 Right Forearm less than 1 day          Drain            Colostomy  RUQ 23 days    Urethral Catheter Temperature probe 16 Fr  1 day                   Counseling / Coordination of Care  Total Critical Care time spent 20 minutes excluding procedures, teaching and family updates  Code Status: Level 1 - Full Code     Portions of the record may have been created with voice recognition software  Occasional wrong word or "sound a like" substitutions may have occurred due to the inherent limitations of voice recognition software  Read the chart carefully and recognize, using context, where substitutions have occurred       Gayle Bill PA-C

## 2018-06-15 NOTE — PROGRESS NOTES
Rounded with SOD B resident  Plan to consult PT/OT today  Pt requesting pain medication be changed to tramadol, as he takes this at home and it helps his stomach cramps  Orders placed for tramadol prn

## 2018-06-15 NOTE — PROGRESS NOTES
Spoke to Padmini Patel with [employer]  Plan to fluid replace pt ostomy output with normal saline  For 1ml of ostomy output will replaced with 1/2ml of NSS q 4hour

## 2018-06-15 NOTE — PROGRESS NOTES
- Will start Imodium 2 mg BID  - Will start ileostomy replacement every 4 hours  Replace 0 5 ml per each 1 ml out  Spoke with Nurse and she understands the replacement process

## 2018-06-15 NOTE — PROGRESS NOTES
[facility] Unit Transfer Note  Unit/Bed # @DBLINK (AGATA,37760)@ Encounter: [id number]  SOD Team B           Yung Carlson 66 y o  male [id number]      Active Hospital Problems: Principal Problem:    Hypotension  Active Problems:    Leukocytosis    Acute kidney failure (HCC)    Alcohol abuse    Tobacco abuse    Heart failure, systolic, due to idiopathic cardiomyopathy ([facility] [address] )    Ileostomy in place ([facility] [address] )    Encephalopathy    Dehydration    Sepsis ([facility] [address] )    1  Hypotension, systolics in between 91R and 80s, has largely resolved and now his blood pressures are ranging in systolics of 93F to 388M  Likely secondary to volume depletion as ascertain by exam and IVC compressibility on ultrasound  Unlikely to be infectious given his negative procalcitonin  Initiate aggressive IV fluid resuscitation with initial bolus, patient has received 4 L at this point and is still examining and is volume contracted  The patient did receive steroids while he was admitted recently and adrenal insufficiency cannot be completely ruled out  Steroids have been re-initiated per the Critical Care Service  Continue to monitor clinically, his altered mental status appears to be significantly improved this evening verses the last time I evaluated him  Antibiotics were initiated      2  Intermittent altered mental status, acutely tremulous appearing, patient pulling out IV and removing EKG and pulse oximetry  Patient has a rather extensive alcohol history, but he is adamant in his denial that he has had any alcohol to drink since he was most recently admitted to the hospital at the beginning of the month  An ethanol level was ordered to verify the patient's statements of abstinence      3   Leukocytosis, likely secondary to volume contraction and hemoconcentration, but infectious etiology cannot completely be ruled out secondary to his poor overall hygiene and habitus    Patient has numerous sites for possible infection inclusive of a midline incision, diverting colostomy, overall poor hygiene with numerous small abrasions, left lower lobe consolidation  despite aggressive fluid resuscitation the patient's white blood cell count continues to trend upwards, this may be related to re-initiation of steroids or it may be related to a worsening infectious course  Can't will continue to monitor while on antibiotics      4   History of hypertension, holding all antihypertensive medications at this time secondary to hypotension     5  Alcohol abuse, chronic condition for the patient, patient adamantly stating that he has been abstinent from alcohol since he was admitted to the hospital at the beginning of the month  An ethanol level was negative, and he was given a single dose of phenobarbital in the emergency department, which did not significantly improve his symptoms      6  DEBBY on CKD, creatinine 2 23 today (which is significantly improved from yesterday of 3 37) from a stable baseline of approximately 0 7-0 8  Avoid nephrotoxin medications, aggressive IV fluid resuscitation as this is likely pre renal in etiology  Continue to monitor closely and fluid resuscitated as indicated      VTE Pharmacologic Prophylaxis: Heparin  VTE Mechanical Prophylaxis: sequential compression device    Disposition: Patient requires Level 2 Step Down     Hospital Course:  Patient was initially admitted to SOB B service, and then was immediately transferred to the critical care team secondary to persistent hypotension as well as possible alcohol withdrawal   Patient received phenobarbital for potential withdrawal, with minimal affect  Patient required aggressive IV fluid resuscitation and initiation of Nik-Synephrine for hemodynamic support  Patient has a history of Crohn's disease with bowel perforation and recent diverting ileostomy  Patient had been on a steroid taper and as such steroids were re-initiated for blood pressure support    Antibiotics were initiated for broad coverage source indeterminate, but possible consolidation noted in left lower lobe  Blood negative for growth at 2400 hours, urine cultures pending  Procalcitonin levels were sufficiently elevated to indicate increasing likelihood of infection is they were trending upwards  His persistent hypotension gradually began to improve and the patient was able to successfully be weaned off of the Nik-Synephrine drip  At this point was determined that the patient was appropriate for transfer to the medical floors as a level 2 step-down  Subjective:  No acute events overnight, patient is resting comfortably in the bed  No acute concerns from nursing staff at this time  Objective:    Physical Exam   Constitutional: He is oriented to person, place, and time  No distress  Elderly, frail-appearing, cachectic appearing gentleman resting comfortably in the bed  Patient is conversant and easily arousable to minimal verbal stimuli  HENT:   Head: Normocephalic and atraumatic  Mouth/Throat: Oropharynx is clear and moist  No oropharyngeal exudate  Eyes: Conjunctivae and EOM are normal  Pupils are equal, round, and reactive to light  Right eye exhibits no discharge  Left eye exhibits no discharge  No scleral icterus  Neck: Normal range of motion  Neck supple  No JVD present  No thyromegaly present  Cardiovascular: Normal rate, regular rhythm and normal heart sounds  Exam reveals no gallop and no friction rub  No murmur heard  Pulmonary/Chest: Effort normal  No respiratory distress  He has no rales  Minimal end-expiratory wheezing, mild rhonchi noted at the bases bilaterally left more than right  Abdominal: Soft  Bowel sounds are normal  He exhibits no distension  There is tenderness (Acutely and diffusely tender to minimal palpation more so in left upper and left lower quadrants  )  There is no rebound and no guarding     Right-sided diverted ileostomy in place draining large volume of liquid green fecal content  Musculoskeletal: He exhibits no edema, tenderness or deformity  Neurological: He is alert and oriented to person, place, and time  No cranial nerve deficit  He exhibits normal muscle tone  Skin: Skin is warm and dry  No rash noted  He is not diaphoretic  No erythema  No pallor  Nursing note and vitals reviewed  Vitals:    06/14/18 2200 06/14/18 2230 06/14/18 2300 06/14/18 2310   BP: 110/70 103/65 92/56    BP Location:       Pulse: 92 94 94    Resp: 16 20 21    Temp: 100 °F (37 8 °C) 100 °F (37 8 °C) 100 °F (37 8 °C) 100 2 °F (37 9 °C)   TempSrc:    Probe   SpO2: 96% 94% 93%    Weight:       Height:         I/O last 24 hours: In: 7210 8 [I V :3560 8;  IV Piggyback:3650]  Out: 8167 [IZFMS:9040; Stool:1900]          Ivonne Echeverria DO

## 2018-06-16 LAB
ANION GAP SERPL CALCULATED.3IONS-SCNC: 7 MMOL/L (ref 4–13)
BACTERIA UR CULT: ABNORMAL
BACTERIA UR CULT: ABNORMAL
BASOPHILS # BLD AUTO: 0.01 THOUSANDS/ΜL (ref 0–0.1)
BASOPHILS NFR BLD AUTO: 0 % (ref 0–1)
BUN SERPL-MCNC: 12 MG/DL (ref 5–25)
CALCIUM SERPL-MCNC: 7.6 MG/DL (ref 8.3–10.1)
CHLORIDE SERPL-SCNC: 109 MMOL/L (ref 100–108)
CO2 SERPL-SCNC: 25 MMOL/L (ref 21–32)
CREAT SERPL-MCNC: 0.76 MG/DL (ref 0.6–1.3)
EOSINOPHIL # BLD AUTO: 0.01 THOUSAND/ΜL (ref 0–0.61)
EOSINOPHIL NFR BLD AUTO: 0 % (ref 0–6)
ERYTHROCYTE [DISTWIDTH] IN BLOOD BY AUTOMATED COUNT: 16.1 % (ref 11.6–15.1)
GFR SERPL CREATININE-BSD FRML MDRD: 102 ML/MIN/1.73SQ M
GLUCOSE SERPL-MCNC: 88 MG/DL (ref 65–140)
HCT VFR BLD AUTO: 27.2 % (ref 36.5–49.3)
HGB BLD-MCNC: 8.4 G/DL (ref 12–17)
IMM GRANULOCYTES # BLD AUTO: 0.11 THOUSAND/UL (ref 0–0.2)
IMM GRANULOCYTES NFR BLD AUTO: 1 % (ref 0–2)
LYMPHOCYTES # BLD AUTO: 0.98 THOUSANDS/ΜL (ref 0.6–4.47)
LYMPHOCYTES NFR BLD AUTO: 7 % (ref 14–44)
MAGNESIUM SERPL-MCNC: 1.6 MG/DL (ref 1.6–2.6)
MCH RBC QN AUTO: 30.8 PG (ref 26.8–34.3)
MCHC RBC AUTO-ENTMCNC: 30.9 G/DL (ref 31.4–37.4)
MCV RBC AUTO: 100 FL (ref 82–98)
MONOCYTES # BLD AUTO: 1.2 THOUSAND/ΜL (ref 0.17–1.22)
MONOCYTES NFR BLD AUTO: 9 % (ref 4–12)
NEUTROPHILS # BLD AUTO: 11.41 THOUSANDS/ΜL (ref 1.85–7.62)
NEUTS SEG NFR BLD AUTO: 83 % (ref 43–75)
NRBC BLD AUTO-RTO: 0 /100 WBCS
PHOSPHATE SERPL-MCNC: 2.5 MG/DL (ref 2.7–4.5)
PLATELET # BLD AUTO: 316 THOUSANDS/UL (ref 149–390)
PMV BLD AUTO: 9.9 FL (ref 8.9–12.7)
POTASSIUM SERPL-SCNC: 4.2 MMOL/L (ref 3.5–5.3)
RBC # BLD AUTO: 2.73 MILLION/UL (ref 3.88–5.62)
SODIUM SERPL-SCNC: 141 MMOL/L (ref 136–145)
WBC # BLD AUTO: 13.72 THOUSAND/UL (ref 4.31–10.16)

## 2018-06-16 PROCEDURE — 85025 COMPLETE CBC W/AUTO DIFF WBC: CPT | Performed by: PHYSICIAN ASSISTANT

## 2018-06-16 PROCEDURE — 94760 N-INVAS EAR/PLS OXIMETRY 1: CPT

## 2018-06-16 PROCEDURE — 97166 OT EVAL MOD COMPLEX 45 MIN: CPT

## 2018-06-16 PROCEDURE — G8988 SELF CARE GOAL STATUS: HCPCS

## 2018-06-16 PROCEDURE — 80048 BASIC METABOLIC PNL TOTAL CA: CPT | Performed by: PHYSICIAN ASSISTANT

## 2018-06-16 PROCEDURE — G8979 MOBILITY GOAL STATUS: HCPCS

## 2018-06-16 PROCEDURE — G8987 SELF CARE CURRENT STATUS: HCPCS

## 2018-06-16 PROCEDURE — 83735 ASSAY OF MAGNESIUM: CPT | Performed by: PHYSICIAN ASSISTANT

## 2018-06-16 PROCEDURE — G8978 MOBILITY CURRENT STATUS: HCPCS

## 2018-06-16 PROCEDURE — 94640 AIRWAY INHALATION TREATMENT: CPT

## 2018-06-16 PROCEDURE — 84100 ASSAY OF PHOSPHORUS: CPT | Performed by: PHYSICIAN ASSISTANT

## 2018-06-16 PROCEDURE — 97163 PT EVAL HIGH COMPLEX 45 MIN: CPT

## 2018-06-16 RX ADMIN — HYDROCORTISONE SODIUM SUCCINATE 25 MG: 100 INJECTION, POWDER, FOR SOLUTION INTRAMUSCULAR; INTRAVENOUS at 21:11

## 2018-06-16 RX ADMIN — PSYLLIUM HUSK 1 PACKET: 3.4 POWDER ORAL at 12:07

## 2018-06-16 RX ADMIN — TRAMADOL HYDROCHLORIDE 50 MG: 50 TABLET, FILM COATED ORAL at 17:41

## 2018-06-16 RX ADMIN — FOLIC ACID 1 MG: 1 TABLET ORAL at 08:21

## 2018-06-16 RX ADMIN — PSYLLIUM HUSK 1 PACKET: 3.4 POWDER ORAL at 17:42

## 2018-06-16 RX ADMIN — HYDROCORTISONE SODIUM SUCCINATE 25 MG: 100 INJECTION, POWDER, FOR SOLUTION INTRAMUSCULAR; INTRAVENOUS at 05:58

## 2018-06-16 RX ADMIN — ASPIRIN 81 MG 81 MG: 81 TABLET ORAL at 08:21

## 2018-06-16 RX ADMIN — TRAMADOL HYDROCHLORIDE 50 MG: 50 TABLET, FILM COATED ORAL at 11:24

## 2018-06-16 RX ADMIN — LOPERAMIDE HYDROCHLORIDE 2 MG: 2 CAPSULE ORAL at 17:42

## 2018-06-16 RX ADMIN — Medication 400 MG: at 17:41

## 2018-06-16 RX ADMIN — ALBUTEROL SULFATE 2.5 MG: 2.5 SOLUTION RESPIRATORY (INHALATION) at 08:45

## 2018-06-16 RX ADMIN — HEPARIN SODIUM 5000 UNITS: 5000 INJECTION, SOLUTION INTRAVENOUS; SUBCUTANEOUS at 14:05

## 2018-06-16 RX ADMIN — ALBUTEROL SULFATE 2.5 MG: 2.5 SOLUTION RESPIRATORY (INHALATION) at 17:56

## 2018-06-16 RX ADMIN — TIOTROPIUM BROMIDE 18 MCG: 18 CAPSULE ORAL; RESPIRATORY (INHALATION) at 08:29

## 2018-06-16 RX ADMIN — Medication 400 MG: at 08:21

## 2018-06-16 RX ADMIN — HEPARIN SODIUM 5000 UNITS: 5000 INJECTION, SOLUTION INTRAVENOUS; SUBCUTANEOUS at 21:11

## 2018-06-16 RX ADMIN — BUDESONIDE AND FORMOTEROL FUMARATE DIHYDRATE 2 PUFF: 160; 4.5 AEROSOL RESPIRATORY (INHALATION) at 20:32

## 2018-06-16 RX ADMIN — LOPERAMIDE HYDROCHLORIDE 2 MG: 2 CAPSULE ORAL at 08:21

## 2018-06-16 RX ADMIN — NICOTINE 1 PATCH: 14 PATCH, EXTENDED RELEASE TRANSDERMAL at 08:21

## 2018-06-16 RX ADMIN — AMIODARONE HYDROCHLORIDE 200 MG: 200 TABLET ORAL at 08:21

## 2018-06-16 RX ADMIN — Medication 30000 UNITS: at 08:30

## 2018-06-16 RX ADMIN — TRAMADOL HYDROCHLORIDE 50 MG: 50 TABLET, FILM COATED ORAL at 01:22

## 2018-06-16 RX ADMIN — CEFEPIME HYDROCHLORIDE 1000 MG: 1 INJECTION, SOLUTION INTRAVENOUS at 08:21

## 2018-06-16 RX ADMIN — Medication 100 MG: at 08:21

## 2018-06-16 RX ADMIN — HEPARIN SODIUM 5000 UNITS: 5000 INJECTION, SOLUTION INTRAVENOUS; SUBCUTANEOUS at 05:59

## 2018-06-16 RX ADMIN — CEFEPIME HYDROCHLORIDE 1000 MG: 1 INJECTION, SOLUTION INTRAVENOUS at 19:39

## 2018-06-16 RX ADMIN — BUDESONIDE AND FORMOTEROL FUMARATE DIHYDRATE 2 PUFF: 160; 4.5 AEROSOL RESPIRATORY (INHALATION) at 08:28

## 2018-06-16 RX ADMIN — ATORVASTATIN CALCIUM 40 MG: 40 TABLET, FILM COATED ORAL at 17:41

## 2018-06-16 RX ADMIN — HYDROCORTISONE SODIUM SUCCINATE 25 MG: 100 INJECTION, POWDER, FOR SOLUTION INTRAMUSCULAR; INTRAVENOUS at 14:05

## 2018-06-16 NOTE — PLAN OF CARE
Problem: PHYSICAL THERAPY ADULT  Goal: Performs mobility at highest level of function for planned discharge setting  See evaluation for individualized goals  Treatment/Interventions: Functional transfer training, LE strengthening/ROM, Therapeutic exercise, Endurance training, Patient/family training, Equipment eval/education, Bed mobility, Gait training  Equipment Recommended: Brian Kim       See flowsheet documentation for full assessment, interventions and recommendations  Prognosis: Fair  Problem List: Decreased strength, Decreased endurance, Impaired balance, Decreased mobility, Impaired judgement, Decreased safety awareness, Pain  Assessment: Pt is a 66 y o  male seen for PT evaluation s/p admit to [facility] on 6/13/2018 w/ Hypotension  Order placed for PT  Comorbidities affecting pt's physical performance at time of assessment listed above  Personal factors affecting pt at time of IE include: limited home support, behavioral pattern, inability to perform IADLs, inability to perform ADLs, inability to ambulate household distances, limited insight into impairments and recent fall(s)  Prior to admission, pt was was independent w/ all functional mobility w/ RW, lived in one floor environment and lived with daughter and family  Upon evaluation: Pt requires min A for bed mobility and mod A x1 for sit to stand  Pt declined all further assessment despite encouragement  SOB noted during stand, however pt had taken his O2 NC off despite requests to replace it  O2 replaced after supine in bed  Pt is easily agitated throughout session  (Please find full objective findings from PT assessment regarding body systems outlined above)  Impairments and limitations also listed above, especially due to  weakness, impaired balance, decreased endurance, pain, decreased activity tolerance, decreased safety awareness, impaired judgement, fall risk and SOB upon exertion   The following objective measures performed on IE also reveal limitations: Barthel Index 30/100  Pt's clinical presentation is currently unstable/unpredictable seen in pt's presentation of decreased safety awareness, fall risk, agitation, impulsivity, and lack of insight into deficits  Pt to benefit from continued skilled PT tx while in hospital and upon DC to address deficits as defined above and maximize level of functional mobility  From PT/mobility standpoint, recommendation at time of d/c would be inpatient rehab pending progress in order to maximize pt's functional independence and consistency w/ mobility in order to facilitate return to PLOF  Recommend progression of ambulation, initiation of HEP, and dynamic balance activities as appropriate  Recommendation: Post acute IP rehab          See flowsheet documentation for full assessment

## 2018-06-16 NOTE — PLAN OF CARE
Problem: OCCUPATIONAL THERAPY ADULT  Goal: Performs self-care activities at highest level of function for planned discharge setting  See evaluation for individualized goals  Treatment Interventions: ADL retraining, Functional transfer training, UE strengthening/ROM, Endurance training, Cognitive reorientation, Patient/family training, Equipment evaluation/education, Compensatory technique education, Continued evaluation, Energy conservation, Activityengagement          See flowsheet documentation for full assessment, interventions and recommendations  Limitation: Decreased ADL status, Decreased UE strength, Decreased Safe judgement during ADL, Decreased endurance, Decreased self-care trans, Decreased high-level ADLs  Prognosis: Fair  Assessment: Pt is a 65 y/o male seen for OT eval s/p adm to [facility] after being evaluated by [facility] while @ home for significant hypotension  Pt is dx'd w/ hypotension  Pt recently adm for ileostomy creation for perforated transverse colon  Comorbidities include a h/o asthma, cardiac disease, colitis, colon polyps, COPD, CAD, diverticulosis, HLD, HTN, MI, perforation of colon, and ulcerative colitis  Pt with active OT orders and up w/ A  Pt lives with daughter and her kids in multi story home w/ no KAREN and 1st floor setup   Pt was I w/  ADLS( w/ setup-sponge bathes at sink), and needed A for IADLS, does not drive-grandson does, & required use of DME PTA including RW, BSC and home O2 per pt; per chart review pt also has a cane and rollator  Pt is currently demonstrating the following occupational deficits: Mod A UB and LB ADLS, Mod A transfers and Min A bed mobility, pt refusing additional functional mobility at this time   Pt with deficits and limitations in all baseline areas of occupation 2* decreased endurance, fatigue, pain, decreased activity tolerance and functional mobility, decreased strength and balance, generalized deconditioning and weakness, decreased safety awareness and understanding of deficits,decreased forward functional reach, decreased standing tolerance and I w/ ADLS   The following Occupational Performance Areas to address include: eating, grooming, bathing/shower, toilet hygiene, dressing, socialization, health maintenance, functional mobility, community mobility, clothing management and social participation  Pt scored overall 30/100 on the Barthel Index  Based on the aforementioned OT evaluation, functional performance deficits, and assessments, pt has been identified as a moderate complexity evaluation  Recommend pt d/c to STR when medically stable    Pt to continue to benefit from acute immediate OT services to address the following goals 3-5x/wk to  w/in 7-10 days:     OT Discharge Recommendation: Short Term Rehab  OT - OK to Discharge: Yes (when medically stable)      Comments: Codie WILDER, OTR/L

## 2018-06-16 NOTE — PROGRESS NOTES
Progress Note - General Surgery   Mansi Kwan 66 y o  male MRN: [id number]  Unit/Bed#: [facility] 503-01 Encounter: [id number]    Assessment:  58yM presenting with hypotension  Recent ileostomy created for perforated transverse colon  Consulted for high ileostomy output  - Ileostomy output 2000 for last 24 hours    Plan:  Cardiac diet  Cont IVF replacements  Continue imodium  Adding in metamucil  Continue to track I/O  Wet to dry dressings    Subjective/Objective   Subjective: No acute events overnight  Objective:     Blood pressure 126/60, pulse 90, temperature 99 1 °F (37 3 °C), resp  rate 20, height 5' 7" (1 702 m), weight 69 kg (152 lb 1 9 oz), SpO2 91 %  ,Body mass index is 23 82 kg/m²  Intake/Output Summary (Last 24 hours) at 06/16/18 0830  Last data filed at 06/16/18 0601   Gross per 24 hour   Intake          2398 41 ml   Output             2375 ml   Net            23 41 ml       Invasive Devices     Peripheral Intravenous Line            Peripheral IV 06/14/18 Right Forearm 1 day          Drain            Colostomy  RUQ 24 days                Physical Exam:     Gen: NAD, AAOx3  CV: RRR  Pulm: no resp distress  Abd: Soft, non-distended, non-tender  Ostomy with brown stool       Lab, Imaging and other studies:I have personally reviewed pertinent lab results    , CBC:   Lab Results   Component Value Date    WBC 13 72 (H) 06/16/2018    HGB 8 4 (L) 06/16/2018    HCT 27 2 (L) 06/16/2018     (H) 06/16/2018     06/16/2018    MCH 30 8 06/16/2018    MCHC 30 9 (L) 06/16/2018    RDW 16 1 (H) 06/16/2018    MPV 9 9 06/16/2018    NRBC 0 06/16/2018   , CMP:   Lab Results   Component Value Date     06/16/2018    K 4 2 06/16/2018     (H) 06/16/2018    CO2 25 06/16/2018    ANIONGAP 7 06/16/2018    BUN 12 06/16/2018    CREATININE 0 76 06/16/2018    GLUCOSE 88 06/16/2018    CALCIUM 7 6 (L) 06/16/2018    EGFR 102 06/16/2018     VTE Pharmacologic Prophylaxis: Heparin  VTE Mechanical Prophylaxis: sequential compression device

## 2018-06-16 NOTE — OCCUPATIONAL THERAPY NOTE
[address] Evaluation     Patient Name: Kelsey Whitten  Today's Date: 6/16/2018  Problem List  Patient Active Problem List   Diagnosis    Colitis    Spinal stenosis of cervical region    Hypertension    Pericarditis    Coronary artery disease involving native coronary artery of native heart    Leukocytosis    Asthma    Atherosclerosis of coronary artery    [facility] with exacerbation [facility]    Dyslipidemia    Acute kidney failure (HCC)    Alcohol abuse    Tobacco abuse    Alcohol dependence with withdrawal ([facility]    Acute respiratory insufficiency    Agitation    Heart failure, systolic, due to idiopathic cardiomyopathy ([facility]    [facility]ostomy in place [facility]    Moderate protein-calorie malnutrition (HCC)    Anemia    Hypocalcemia    Perforation of colon (HCC)    Hypotension    Encephalopathy    Dehydration    Sepsis ([facility])     Past Medical History  Past Medical History:   Diagnosis Date    Asthma     Cardiac disease     Colitis     Colon polyps     COPD [facility]chronic obstructive pulmonary disease) [facility]     Coronary artery disease     Diverticulitis     Hyperlipidemia     Hypertension     Myocardial infarction ([facility]     Perforation of colon ([facility]     Ulcerative colitis ([facility]      Past Surgical History  Past Surgical History:   Procedure Laterality Date    ANGIOPLASTY      COLON SURGERY      COLONOSCOPY N/A 10/24/2016    Procedure: COLONOSCOPY;  Surgeon: Divya Dewey MD;  [facility]: [facility]; Service:     CORONARY ANGIOPLASTY WITH STENT PLACEMENT      ESOPHAGOGASTRODUODENOSCOPY N/A 10/24/2016    Procedure: ESOPHAGOGASTRODUODENOSCOPY (EGD); Surgeon: Divya Dewey MD;  [facility];   Service:     EXPLORATORY LAPAROTOMY W/ BOWEL RESECTION N/A 5/22/2018    Procedure: EXPLORATORY LAPAROTOMY, ILIOCOLECTOMY, ILIOCOLONIC ANASTAMOSIS, LOOP ILIOSTOMY, REPAIR OF SEROSAL TEAR, EXTENSIVE LYSIS OF ADHESIONS, WOUND VAC PLACEMENT;  Surgeon: Divya Dewey MD; Location: [facility];  Service: Colorectal    DE COLONOSCOPY FLX DX W/COLLJ SPEC WHEN PFRMD N/A 2/6/2018    Procedure: COLONOSCOPY;  Surgeon: Hakan Uribe MD;  Location:  GI LAB; Service: Colorectal    TONSILLECTOMY             06/16/18 0933   Note Type   Note type Eval/Treat   Restrictions/Precautions   Weight Bearing Precautions Per Order No   Other Precautions Cognitive;Multiple lines;Telemetry; Fall Risk;Pain   Pain Assessment   Pain Assessment 0-10   Pain Score 3   Pain Type Acute pain;Surgical pain   Pain Location Abdomen; Incision   Pain Orientation Mid   Pain Descriptors Aching;Discomfort   Patient's Stated Pain Goal No pain   Hospital Pain Intervention(s) Repositioned; Emotional support   Home Living   Type of [address] [place] [address] Multi-level; Able to live on main level with bedroom/bathroom; Bed/bath upstairs; Performs ADLs on one level   Bathroom Shower/Tub Tub/shower unit   Bathroom Toilet Standard   Bathroom Equipment Commode   Home Equipment Walker;Cane  (home O2, rollator)   Additional Comments Pt reports living in multilevel home w/ noSTE; 1st floor setup   Prior Function   Level of [place] Independent with ADLs and functional mobility; Needs assistance with IADLs   Lives With Family;Daughter  (dght and her 3 kids)   Receives Help From Family;Home health   ADL Assistance Independent   IADLs Needs assistance   Falls in the last 6 months 1 to 4   Comments Pt reports being I w/ ADLS (w/ setup), and A for IADLS, and Mod I w/ transfers and functional mobility pTA   Lifestyle   Autonomy Pt reports being I w/ ADLS (w/ setup), and A for IADLS, and Mod I w/ transfers and functional mobility pTA   Reciprocal Relationships Pt lives w/ daughter and her 3 kids; dght works during the day   Service to Others Pt does not work   Intrinsic Gratification Pt reports he does nothing   Psychosocial   Psychosocial (WDL) WDL   ADL   Eating Assistance 5  Supervision/Setup   Grooming Assistance 4  Minimal Assistance   UB Bathing Assistance 3  Moderate Assistance   LB Bathing Assistance 3  Moderate Assistance   UB Dressing Assistance 3  Moderate Assistance   LB Dressing Assistance 3  Moderate Assistance   Toileting Assistance  3  Moderate Assistance   Functional Assistance 3  Moderate Assistance   Bed Mobility   Rolling R 5  Supervision   Additional items Increased time required;Verbal cues   Supine to Sit 4  Minimal assistance   Additional items Assist x 1; Increased time required;LE management;Verbal cues;HOB elevated   Sit to Supine 4  Minimal assistance   Additional items Assist x 1; Increased time required;Verbal cues;LE management;HOB elevated   Additional Comments Pt rolled R 1 time w/ S and performed supine to sit transfer w/ Min A x1 for safety, VC for proper technique and UB control  Once seated EOB pt CTG for sitting balance  Transfers   Sit to Stand 3  Moderate assistance   Additional items Assist x 1; Increased time required;Verbal cues   Stand to Sit 3  Moderate assistance   Additional items Assist x 1; Increased time required;Verbal cues   Additional Comments Pt performed 1 sit-stand from EOB w/ Mod A x1 for moderate force production into standing, and HHA  Pt declining use of RW and any further activity   Functional Mobility   Additional Comments Pt not willing to engage in functional mobility at this time   Balance   Static Sitting Fair -   Static Standing Poor +   Activity Tolerance   Activity Tolerance Patient limited by fatigue;Patient limited by pain;Treatment limited secondary to agitation   Medical Staff Made Aware PT, [place]   Nurse Made Aware yes   RUE Assessment   RUE Assessment WFL   LUE Assessment   LUE Assessment WFL   Hand Function   Gross Motor Coordination Functional   Fine Motor Coordination Functional   Cognition   Overall Cognitive Status WFL   Arousal/Participation Responsive; Cooperative   Attention Within functional limits   Orientation Level Oriented X4   Memory Decreased recall of precautions   Following Commands Follows one step commands without difficulty   Comments Pt is cooperative; initially reporting he will not engage in any OOB activity  W/ encouragment pt willing to sit EOB and perform 1 sit-stand but no further activity  Pt presents w/ decreased safety awareness and undestanding of deficits  Assessment   Limitation Decreased ADL status; Decreased UE strength;Decreased Safe judgement during ADL;Decreased endurance;Decreased self-care trans;Decreased high-level ADLs   Prognosis Fair   Assessment Pt is a 67 y/o male seen for OT eval s/p adm to [facility] after being evaluated by [facility] while @ home for significant hypotension  Pt is dx'd w/ hypotension  Pt recently adm for ileostomy creation for perforated transverse colon  Comorbidities include a h/o asthma, cardiac disease, colitis, colon polyps, COPD, CAD, diverticulosis, HLD, HTN, MI, perforation of colon, and ulcerative colitis  Pt with active OT orders and up w/ A  Pt lives with daughter and her kids in multi story home w/ no KAREN and 1st floor setup   Pt was I w/  ADLS( w/ setup-sponge bathes at sink), and needed A for IADLS, does not drive-grandson does, & required use of DME PTA including RW, BSC and home O2 per pt; per chart review pt also has a cane and rollator  Pt is currently demonstrating the following occupational deficits: Mod A UB and LB ADLS, Mod A transfers and Min A bed mobility, pt refusing additional functional mobility at this time  Pt with deficits and limitations in all baseline areas of occupation 2* decreased endurance, fatigue, pain, decreased activity tolerance and functional mobility, decreased strength and balance, generalized deconditioning and weakness, decreased safety awareness and understanding of deficits,decreased forward functional reach, decreased standing tolerance and I w/ ADLS    The following Occupational Performance Areas to address include: eating, grooming, bathing/shower, toilet hygiene, dressing, socialization, health maintenance, functional mobility, community mobility, clothing management and social participation  Pt scored overall 30/100 on the Barthel Index  Based on the aforementioned OT evaluation, functional performance deficits, and assessments, pt has been identified as a moderate complexity evaluation  Recommend pt d/c to STR when medically stable   Pt to continue to benefit from acute immediate OT services to address the following goals 3-5x/wk to  w/in 7-10 days:   Goals   Patient Goals to be left alone   LTG Time Frame 7-10   Long Term Goal #1 see below listed goals   Plan   Treatment Interventions ADL retraining;Functional transfer training;UE strengthening/ROM; Endurance training;Cognitive reorientation;Patient/family training;Equipment evaluation/education; Compensatory technique education;Continued evaluation; Energy conservation; Activityengagement   Goal Expiration Date 18   OT Frequency 3-5x/wk   Recommendation   OT Discharge Recommendation Short Term Rehab   OT - OK to Discharge Yes  (when medically stable)   Barthel Index   Feeding 10   Bathing 0   Grooming Score 5   Dressing Score 5   Bladder Score 0   Bowels Score 0   Toilet Use Score 5   Transfers (Bed/Chair) Score 5   Mobility (Level Surface) Score 0   Stairs Score 0   Barthel Index Score 30   Modified Omar Scale   Modified Benson Scale 4     GOALS    1) Pt will improve activity tolerance to G for min 30 min txment sessions    2) Pt will complete UB/LB dressing/self care w/ S using adaptive device and DME as needed    3) Pt will complete bathing w/ S w/ use of AE and DME as needed    4) Pt will complete toileting (management of ileostomy bag) w/ S w/ G hygiene/thoroughness using DME as needed    5) Pt will improve functional transfers to S on/off all surfaces using DME as needed w/ G balance/safety     6) Pt will improve functional mobility during ADL/IADL/leisure tasks to S using DME as needed w/ G balance/safety     7) Pt will engage in ongoing cognitive assessment w/ G participation w/ S to assist w/ safe d/c planning/recommendations    9) Pt will demonstrate G carryover of pt/caregiver education and training as appropriate w/ S w/o cues w/ good tolerance    10) Pt will demonstrate 100% carryover of energy conservation techniques w/ S t/o functional I/ADL/leisure tasks w/o cues s/p skilled education     [facility] MOT, OTR/L

## 2018-06-16 NOTE — PROGRESS NOTES
IM Residency Progress Note   Unit/Bed#: [facility] 503-01 Encounter: [id number]  SOD Team B       Yung Carlson 66 y o  male [id number]    Hospital Stay Days: 3      Assessment/Plan:    Principal Problem:    Pneumonia, Hcap  Chest x-ray suggestive left lower lobe pneumonia  Procalcitonin elevated at 0 42  Empiric cefepime 1 g Q 12 was initiated  Day 2  Procalcitonin now trending down 0 34    Chronic respiratory insufficiency  Likely related to Past medical history of severe COPD  Patient states he requires oxygen 24 hours 7 days per week  At home he is on O2 2 L nasal cannula    COPD  Without exacerbation  Continue home medications:  Symbicort and albuterol inhalation solution q 4 hours p r n  Hypotension  Likely multifactorial from volume depletion and now possible infection due to elevated calcitonin and signs of left sided pneumonia on x-ray  Continue with Solu-Cortef 3 times daily 25 mg injection    Past medical history hypertension  Holding all blood pressure medications      Leukocytosis  Stable  Possible related to steroids and possible pneumonia  Acute kidney failure (HCC)  Resolve  Creatinine back to baseline 0 76      Alcohol abuse  CIWA score  0  Continue to monitor    History Crohn's disease  Recently perforated transverse colon  Status post colectomy        Ileostomy in place [facility])  Colorectal following  Patient started on Imodium 2 mg twice daily  Repletion of Volume:  0 5 mL per each 1 mL out    History CAD  Status post PCI in 2012  Holding some of the medications depending on blood pressure improvement  Continue atorvastatin 40 mg daily aspirin 81 mg daily      Encephalopathy  Resolve      Dehydration  Resolve      Sepsis ([address] )  Resolve      Disposition:   Continue medical care      Subjective:   Patient seen examined bedside  States doing better compared to couple days ago    Still requiring 2 L oxygen per nasal cannula, however these looks like chronic issue, since here so require oxygen at home       Vitals: Temp (24hrs), Av 7 °F (37 1 °C), Min:98 °F (36 7 °C), Max:99 1 °F (37 3 °C)  Current: Temperature: 99 1 °F (37 3 °C)  Vitals:    06/15/18 2300 18 0430 18 0600 18 0733   BP: 101/59 108/66  126/60   BP Location:       Pulse: 97 92  90   Resp: 18 20  20   Temp: 99 °F (37 2 °C) 98 8 °F (37 1 °C)  99 1 °F (37 3 °C)   TempSrc: Oral Oral     SpO2: 92% 90%  91%   Weight:   69 kg (152 lb 1 9 oz)    Height:        Body mass index is 23 82 kg/m²  I/O last 24 hours:   In: 2398 4 [P O :180; I V :2168 4; IV Piggyback:50]  Out: 2750 [Urine:750; Stool:2000]      Physical Exam: /60   Pulse 90   Temp 99 1 °F (37 3 °C)   Resp 20   Ht 5' 7" (1 702 m)   Wt 69 kg (152 lb 1 9 oz)   SpO2 91%   BMI 23 82 kg/m²     General Appearance:    Alert, cooperative, no distress, appears stated age   Head:    Normocephalic, without obvious abnormality, atraumatic   Eyes:    PERRL, conjunctiva/corneas clear, EOM's intact, fundi     benign, both eyes        Ears:    Normal TM's and external ear canals, both ears   Nose:   Nares normal, septum midline, mucosa normal, no drainage    or sinus tenderness   Throat:   Lips, mucosa, and tongue normal; teeth and gums normal   Neck:   Supple, symmetrical, trachea midline, no adenopathy;        thyroid:  No enlargement/tenderness/nodules; no carotid    bruit or JVD   Back:     Symmetric, no curvature, ROM normal, no CVA tenderness   Lungs:     Clear to auscultation bilaterally, respirations unlabored   Chest wall:    No tenderness or deformity   Heart:    Regular rate and rhythm, S1 and S2 normal, no murmur, rub   or gallop   Abdomen:     Soft, non-tender, bowel sounds active all four quadrants,     no masses, no organomegaly   Genitalia:    Normal male without lesion, discharge or tenderness   Rectal:    Normal tone, normal prostate, no masses or tenderness;    guaiac negative stool   Extremities:   Extremities normal, atraumatic, no cyanosis or edema Pulses:   2+ and symmetric all extremities   Skin:   Skin color, texture, turgor normal, no rashes or lesions   Lymph nodes:   Cervical, supraclavicular, and axillary nodes normal   Neurologic:   CNII-XII intact   Normal strength, sensation and reflexes       throughout        Invasive Devices     Peripheral Intravenous Line            Peripheral IV 06/14/18 Right Forearm 1 day          Drain            Colostomy  RUQ 24 days                          Labs:   Recent Results (from the past 24 hour(s))   CBC and differential    Collection Time: 06/16/18  4:34 AM   Result Value Ref Range    WBC 13 72 (H) 4 31 - 10 16 Thousand/uL    RBC 2 73 (L) 3 88 - 5 62 Million/uL    Hemoglobin 8 4 (L) 12 0 - 17 0 g/dL    Hematocrit 27 2 (L) 36 5 - 49 3 %     (H) 82 - 98 fL    MCH 30 8 26 8 - 34 3 pg    MCHC 30 9 (L) 31 4 - 37 4 g/dL    RDW 16 1 (H) 11 6 - 15 1 %    MPV 9 9 8 9 - 12 7 fL    Platelets 991 672 - 607 Thousands/uL    nRBC 0 /100 WBCs    Neutrophils Relative 83 (H) 43 - 75 %    Immat GRANS % 1 0 - 2 %    Lymphocytes Relative 7 (L) 14 - 44 %    Monocytes Relative 9 4 - 12 %    Eosinophils Relative 0 0 - 6 %    Basophils Relative 0 0 - 1 %    Neutrophils Absolute 11 41 (H) 1 85 - 7 62 Thousands/µL    Immature Grans Absolute 0 11 0 00 - 0 20 Thousand/uL    Lymphocytes Absolute 0 98 0 60 - 4 47 Thousands/µL    Monocytes Absolute 1 20 0 17 - 1 22 Thousand/µL    Eosinophils Absolute 0 01 0 00 - 0 61 Thousand/µL    Basophils Absolute 0 01 0 00 - 0 10 Thousands/µL   Basic metabolic panel    Collection Time: 06/16/18  4:34 AM   Result Value Ref Range    Sodium 141 136 - 145 mmol/L    Potassium 4 2 3 5 - 5 3 mmol/L    Chloride 109 (H) 100 - 108 mmol/L    CO2 25 21 - 32 mmol/L    Anion Gap 7 4 - 13 mmol/L    BUN 12 5 - 25 mg/dL    Creatinine 0 76 0 60 - 1 30 mg/dL    Glucose 88 65 - 140 mg/dL    Calcium 7 6 (L) 8 3 - 10 1 mg/dL    eGFR 102 ml/min/1 73sq m   Magnesium    Collection Time: 06/16/18  4:34 AM   Result Value Ref Range    Magnesium 1 6 1 6 - 2 6 mg/dL   Phosphorus    Collection Time: 06/16/18  4:34 AM   Result Value Ref Range    Phosphorus 2 5 (L) 2 7 - 4 5 mg/dL       Radiology Results: I have personally reviewed pertinent reports  Other Diagnostic Testing:   I have personally reviewed pertinent reports          Active Meds:   Current Facility-Administered Medications   Medication Dose Route Frequency    acetaminophen (TYLENOL) tablet 650 mg  650 mg Oral Q6H PRN    albuterol (PROVENTIL HFA,VENTOLIN HFA) inhaler 2 puff  2 puff Inhalation Q4H PRN    albuterol inhalation solution 2 5 mg  2 5 mg Nebulization Q4H PRN    amiodarone tablet 200 mg  200 mg Oral Daily    aspirin chewable tablet 81 mg  81 mg Oral Daily    atorvastatin (LIPITOR) tablet 40 mg  40 mg Oral Daily With Dinner    budesonide-formoterol (SYMBICORT) 160-4 5 mcg/act inhaler 2 puff  2 puff Inhalation BID    cefepime (MAXIPIME) IVPB (premix) 1,000 mg  1,000 mg Intravenous T25A    folic acid (FOLVITE) tablet 1 mg  1 mg Oral Daily    heparin (porcine) subcutaneous injection 5,000 Units  5,000 Units Subcutaneous Q8H [facility]    hydrocortisone sodium succinate (PF) (Solu-CORTEF) injection 25 mg  25 mg Intravenous Q8H [facility]    loperamide (IMODIUM) capsule 2 mg  2 mg Oral BID    magnesium oxide (MAG-OX) tablet 400 mg  400 mg Oral BID    nicotine (NICODERM CQ) 14 mg/24hr TD 24 hr patch 1 patch  1 patch Transdermal Daily    ondansetron (ZOFRAN) injection 4 mg  4 mg Intravenous Q6H PRN    sodium chloride 0 9 % infusion  100 mL/hr Intravenous Continuous    thiamine (VITAMIN B1) tablet 100 mg  100 mg Oral Daily    tiotropium (SPIRIVA) capsule for inhaler 18 mcg  18 mcg Inhalation Daily    traMADol (ULTRAM) tablet 50 mg  50 mg Oral Q6H PRN    vitamin A capsule 30,000 Units  30,000 Units Oral Daily         VTE Pharmacologic Prophylaxis: Heparin  VTE Mechanical Prophylaxis: sequential compression device    Mesfin Hassan DO

## 2018-06-16 NOTE — PLAN OF CARE
Problem: Potential for Falls  Goal: Patient will remain free of falls  INTERVENTIONS:  - Assess patient frequently for physical needs  -  Identify cognitive and physical deficits and behaviors that affect risk of falls  -  [place] fall precautions as indicated by assessment   - Educate patient/family on patient safety including physical limitations  - Instruct patient to call for assistance with activity based on assessment  - Modify environment to reduce risk of injury  - Consider OT/PT consult to assist with strengthening/mobility   Outcome: Progressing      Problem: Prexisting or High Potential for Compromised Skin Integrity  Goal: Skin integrity is maintained or improved  INTERVENTIONS:  - Identify patients at risk for skin breakdown  - Assess and monitor skin integrity  - Assess and monitor nutrition and hydration status  - Monitor labs (i e  albumin)  - Assess for incontinence   - Turn and reposition patient  - Assist with mobility/ambulation  - Relieve pressure over bony prominences  - Avoid friction and shearing  - Provide appropriate hygiene as needed including keeping skin clean and dry  - Evaluate need for skin moisturizer/barrier cream  - Collaborate with interdisciplinary team (i e  Nutrition, Rehabilitation, etc )   - Patient/family teaching   Outcome: Progressing      Problem: Nutrition/Hydration-ADULT  Goal: Nutrient/Hydration intake appropriate for improving, restoring or maintaining nutritional needs  Monitor and assess patient's nutrition/hydration status for malnutrition (ex- brittle hair, bruises, dry skin, pale skin and conjunctiva, muscle wasting, smooth red tongue, and disorientation)  Collaborate with interdisciplinary team and initiate plan and interventions as ordered  Monitor patient's weight and dietary intake as ordered or per policy  Utilize nutrition screening tool and intervene per policy   Determine patient's food preferences and provide high-protein, high-caloric foods as appropriate  INTERVENTIONS:  - Monitor oral intake, urinary output, labs, and treatment plans  - Assess nutrition and hydration status and recommend course of action  - Evaluate amount of meals eaten  - Assist patient with eating if necessary   - Allow adequate time for meals  - Recommend/ encourage appropriate diets, oral nutritional supplements, and vitamin/mineral supplements  - Order, calculate, and assess calorie counts as needed  - Recommend, monitor, and adjust tube feedings and TPN/PPN based on assessed needs  - Assess need for intravenous fluids  - Provide specific nutrition/hydration education as appropriate  - Include patient/family/caregiver in decisions related to nutrition   Outcome: Progressing      Problem: DISCHARGE PLANNING - CARE MANAGEMENT  Goal: Discharge to post-acute care or home with appropriate resources  INTERVENTIONS:  - Conduct assessment to determine patient/family and health care team treatment goals, and need for post-acute services based on payer coverage, community resources, and patient preferences, and barriers to discharge  - Address psychosocial, clinical, and financial barriers to discharge as identified in assessment in conjunction with the patient/family and health care team  - Arrange appropriate level of post-acute services according to patient's   needs and preference and payer coverage in collaboration with the physician and health care team  - Communicate with and update the patient/family, physician, and health care team regarding progress on the discharge plan  - Arrange appropriate transportation to post-acute venues  - Complete referral to [facility]  - Provide education and referral to HOST     Outcome: Progressing

## 2018-06-16 NOTE — PHYSICAL THERAPY NOTE
PHYSICAL THERAPY EVALUATION  NAME: Irma Grimes  AGE:   66 y o  MRN:  [id number]  ADMIT DX: Dehydration [E86 0]  Hypotension [I95 9]  Perforation of colon (HCC) [K63 1]  Acute renal failure, unspecified acute renal failure type ([place] [address] ) [N17 9]  Leukocytosis, unspecified type [D72 829]  Presence of ileostomy ([place] [address] ) [Z93 2]    PMH:   Past Medical History:   Diagnosis Date    Asthma     Cardiac disease     Colitis     Colon polyps     COPD (chronic obstructive pulmonary disease) ([place] [address] )     Coronary artery disease     Diverticulitis     Hyperlipidemia     Hypertension     Myocardial infarction ([place] [address] )     Perforation of colon ([place] [address] )     Ulcerative colitis ([place] [address] )      LENGTH OF STAY: 3       06/16/18 0934   Pain Assessment   Pain Assessment 0-10   Pain Score 3   Pain Type Surgical pain   Pain Location Abdomen   Hospital Pain Intervention(s) Repositioned   Response to Interventions tolerated   Home Living   Type of [place] [address] to live on main level with bedroom/bathroom; Two level;Bed/bath upstairs  (0 KAREN; sleeps on recliner and uses a commode)   Home Equipment Walker;Cane  (per chart, rollator)   Additional Comments Ambulates with mod I and RW at baseline  Prior Function   Level of [place] Independent with ADLs and functional mobility   Lives With Daughter;Family   Receives Help From Home health; Family  (home PT)   ADL Assistance Independent   IADLs Needs assistance   Falls in the last 6 months 1 to 4  (1 fall in hospital during, "2 admissions ago")   Restrictions/Precautions   Weight Bearing Precautions Per Order No   Other Precautions Impulsive;Agitated;Multiple lines;Telemetry;O2;Fall Risk;Pain   General   Family/Caregiver Present Yes  (daughter and granddaughter)   Cognition   Overall Cognitive Status WFL   Arousal/Participation Uncooperative   Orientation Level Oriented to person;Oriented to place;Oriented to situation   Memory Decreased recall of recent events;Decreased recall of precautions   Following Commands Follows one step commands with increased time or repetition   Comments Pt identified by name and hospital bracelet  RLE Assessment   RLE Assessment X   Strength RLE   RLE Overall Strength 4-/5  (functionally)   LLE Assessment   LLE Assessment X   Strength LLE   LLE Overall Strength 4-/5  (functionally)   Bed Mobility   Supine to Sit 4  Minimal assistance   Additional items Assist x 1;HOB elevated; Bedrails; Increased time required; Impulsive;Verbal cues   Sit to Supine 4  Minimal assistance   Additional items Assist x 1; Increased time required; Impulsive;Verbal cues   Transfers   Sit to Stand 3  Moderate assistance   Additional items Assist x 1; Impulsive;Verbal cues   Stand to Sit 3  Moderate assistance   Additional items Assist x 1; Impulsive;Verbal cues   Stand pivot Unable to assess  (pt declines)   Ambulation/Elevation   Gait pattern (pt declined)   Balance   Static Sitting Fair   Dynamic Sitting Fair -   Static Standing Poor +   Dynamic Standing (unable to assess as pt declines)   Endurance Deficit   Endurance Deficit Yes   Endurance Deficit Description limited standing tolerance, SOB    Activity Tolerance   Activity Tolerance Patient limited by fatigue;Treatment limited secondary to medical complications (Comment)   Nurse Made Aware Per RN, pt appropriate to evaluate   Assessment   Prognosis Fair   Problem List Decreased strength;Decreased endurance; Impaired balance;Decreased mobility; Impaired judgement;Decreased safety awareness;Pain   Goals   Patient Goals Pt reports he wants to rest in bed and not be bothered      STG Expiration Date 06/24/18   Short Term Goal #1 Pt will be able to: (1) perform bed mobility with SBA (2) perform sit to stand with SBA (3) ambulate at least 300` with min A and least restrictive AD (4) increase standing balance by 1 grade (5) initiate HEP    Treatment Day 0   Plan   Treatment/Interventions Functional transfer training;ABDIRASHID strengthening/ROM; Therapeutic exercise; Endurance training;Patient/family training;Equipment eval/education; Bed mobility;Gait training   PT Frequency (3-5x/week)   Recommendation   Recommendation Post acute IP rehab   Equipment Recommended Walker   Barthel Index   Feeding 10   Bathing 0   Grooming Score 5   Dressing Score 5   Bladder Score 0   Bowels Score 0   Toilet Use Score 5   Transfers (Bed/Chair) Score 5   Mobility (Level Surface) Score 0   Stairs Score 0   Barthel Index Score 30       Assessment: Pt is a 66 y o  male seen for PT evaluation s/p admit to [facility] on 6/13/2018 w/ Hypotension  Order placed for PT  Comorbidities affecting pt's physical performance at time of assessment listed above  Personal factors affecting pt at time of IE include: limited home support, behavioral pattern, inability to perform IADLs, inability to perform ADLs, inability to ambulate household distances, limited insight into impairments and recent fall(s)  Prior to admission, pt was was independent w/ all functional mobility w/ RW, lived in one floor environment and lived with daughter and family  Upon evaluation: Pt requires min A for bed mobility and mod A x1 for sit to stand  Pt declined all further assessment despite encouragement  SOB noted during stand, however pt had taken his O2 NC off despite requests to replace it  O2 replaced after supine in bed  Pt is easily agitated throughout session  (Please find full objective findings from PT assessment regarding body systems outlined above)  Impairments and limitations also listed above, especially due to  weakness, impaired balance, decreased endurance, pain, decreased activity tolerance, decreased safety awareness, impaired judgement, fall risk and SOB upon exertion  The following objective measures performed on IE also reveal limitations: Barthel Index 30/100   Pt's clinical presentation is currently unstable/unpredictable seen in pt's presentation of decreased safety awareness, fall risk, agitation, impulsivity, and lack of insight into deficits  Pt to benefit from continued skilled PT tx while in hospital and upon DC to address deficits as defined above and maximize level of functional mobility  From PT/mobility standpoint, recommendation at time of d/c would be inpatient rehab pending progress in order to maximize pt's functional independence and consistency w/ mobility in order to facilitate return to PLOF  Recommend progression of ambulation, initiation of HEP, and dynamic balance activities as appropriate        Dominik Bunch, PT,DPT

## 2018-06-17 DIAGNOSIS — E78.5 DYSLIPIDEMIA: Primary | ICD-10-CM

## 2018-06-17 PROCEDURE — 94640 AIRWAY INHALATION TREATMENT: CPT

## 2018-06-17 RX ORDER — OXYCODONE HYDROCHLORIDE 5 MG/1
5 TABLET ORAL ONCE
Status: COMPLETED | OUTPATIENT
Start: 2018-06-17 | End: 2018-06-17

## 2018-06-17 RX ORDER — LOPERAMIDE HYDROCHLORIDE 2 MG/1
4 CAPSULE ORAL
Status: DISCONTINUED | OUTPATIENT
Start: 2018-06-17 | End: 2018-06-18

## 2018-06-17 RX ORDER — LORAZEPAM 1 MG/1
1 TABLET ORAL EVERY 8 HOURS PRN
Status: DISCONTINUED | OUTPATIENT
Start: 2018-06-17 | End: 2018-06-28 | Stop reason: HOSPADM

## 2018-06-17 RX ORDER — LOPERAMIDE HYDROCHLORIDE 2 MG/1
2 CAPSULE ORAL
Status: DISCONTINUED | OUTPATIENT
Start: 2018-06-17 | End: 2018-06-17

## 2018-06-17 RX ORDER — ATORVASTATIN CALCIUM 20 MG/1
TABLET, FILM COATED ORAL
Qty: 30 TABLET | Refills: 11 | OUTPATIENT
Start: 2018-06-17 | End: 2018-06-28 | Stop reason: HOSPADM

## 2018-06-17 RX ADMIN — OXYCODONE HYDROCHLORIDE 5 MG: 5 TABLET ORAL at 17:55

## 2018-06-17 RX ADMIN — HYDROCORTISONE SODIUM SUCCINATE 25 MG: 100 INJECTION, POWDER, FOR SOLUTION INTRAMUSCULAR; INTRAVENOUS at 06:05

## 2018-06-17 RX ADMIN — PSYLLIUM HUSK 1 PACKET: 3.4 POWDER ORAL at 17:55

## 2018-06-17 RX ADMIN — TRAMADOL HYDROCHLORIDE 50 MG: 50 TABLET, FILM COATED ORAL at 10:01

## 2018-06-17 RX ADMIN — Medication 400 MG: at 10:01

## 2018-06-17 RX ADMIN — BUDESONIDE AND FORMOTEROL FUMARATE DIHYDRATE 2 PUFF: 160; 4.5 AEROSOL RESPIRATORY (INHALATION) at 10:03

## 2018-06-17 RX ADMIN — LOPERAMIDE HYDROCHLORIDE 2 MG: 2 CAPSULE ORAL at 11:09

## 2018-06-17 RX ADMIN — ASPIRIN 81 MG 81 MG: 81 TABLET ORAL at 10:02

## 2018-06-17 RX ADMIN — TRAMADOL HYDROCHLORIDE 50 MG: 50 TABLET, FILM COATED ORAL at 16:16

## 2018-06-17 RX ADMIN — HEPARIN SODIUM 5000 UNITS: 5000 INJECTION, SOLUTION INTRAVENOUS; SUBCUTANEOUS at 21:32

## 2018-06-17 RX ADMIN — Medication 30000 UNITS: at 10:04

## 2018-06-17 RX ADMIN — HEPARIN SODIUM 5000 UNITS: 5000 INJECTION, SOLUTION INTRAVENOUS; SUBCUTANEOUS at 14:23

## 2018-06-17 RX ADMIN — NICOTINE 1 PATCH: 14 PATCH, EXTENDED RELEASE TRANSDERMAL at 10:03

## 2018-06-17 RX ADMIN — FOLIC ACID 1 MG: 1 TABLET ORAL at 10:02

## 2018-06-17 RX ADMIN — SODIUM CHLORIDE 70 ML/HR: 0.9 INJECTION, SOLUTION INTRAVENOUS at 00:06

## 2018-06-17 RX ADMIN — HEPARIN SODIUM 5000 UNITS: 5000 INJECTION, SOLUTION INTRAVENOUS; SUBCUTANEOUS at 06:05

## 2018-06-17 RX ADMIN — TRAMADOL HYDROCHLORIDE 50 MG: 50 TABLET, FILM COATED ORAL at 00:06

## 2018-06-17 RX ADMIN — ALBUTEROL SULFATE 2.5 MG: 2.5 SOLUTION RESPIRATORY (INHALATION) at 20:25

## 2018-06-17 RX ADMIN — ATORVASTATIN CALCIUM 40 MG: 40 TABLET, FILM COATED ORAL at 16:16

## 2018-06-17 RX ADMIN — HYDROCORTISONE SODIUM SUCCINATE 25 MG: 100 INJECTION, POWDER, FOR SOLUTION INTRAMUSCULAR; INTRAVENOUS at 17:55

## 2018-06-17 RX ADMIN — LOPERAMIDE HYDROCHLORIDE 4 MG: 2 CAPSULE ORAL at 21:32

## 2018-06-17 RX ADMIN — ALBUTEROL SULFATE 2.5 MG: 2.5 SOLUTION RESPIRATORY (INHALATION) at 20:27

## 2018-06-17 RX ADMIN — CEFEPIME HYDROCHLORIDE 1000 MG: 1 INJECTION, SOLUTION INTRAVENOUS at 19:31

## 2018-06-17 RX ADMIN — TIOTROPIUM BROMIDE 18 MCG: 18 CAPSULE ORAL; RESPIRATORY (INHALATION) at 10:04

## 2018-06-17 RX ADMIN — CEFEPIME HYDROCHLORIDE 1000 MG: 1 INJECTION, SOLUTION INTRAVENOUS at 10:07

## 2018-06-17 RX ADMIN — AMIODARONE HYDROCHLORIDE 200 MG: 200 TABLET ORAL at 10:02

## 2018-06-17 RX ADMIN — Medication 400 MG: at 17:55

## 2018-06-17 RX ADMIN — BUDESONIDE AND FORMOTEROL FUMARATE DIHYDRATE 2 PUFF: 160; 4.5 AEROSOL RESPIRATORY (INHALATION) at 17:54

## 2018-06-17 RX ADMIN — PSYLLIUM HUSK 1 PACKET: 3.4 POWDER ORAL at 21:31

## 2018-06-17 RX ADMIN — Medication 100 MG: at 10:02

## 2018-06-17 RX ADMIN — LOPERAMIDE HYDROCHLORIDE 4 MG: 2 CAPSULE ORAL at 16:16

## 2018-06-17 RX ADMIN — PSYLLIUM HUSK 1 PACKET: 3.4 POWDER ORAL at 10:03

## 2018-06-17 RX ADMIN — ALBUTEROL SULFATE 2 PUFF: 90 AEROSOL, METERED RESPIRATORY (INHALATION) at 10:12

## 2018-06-17 NOTE — PROGRESS NOTES
IM Residency Progress Note   Unit/Bed#: [facility] 503-01 Encounter: [id number]  SOD Team B       Yesenia Brownlee 66 y o  male [id number]    Hospital Stay Days: 4      Assessment/Plan:    Principal Problem:    Hypotension  Active Problems:    Leukocytosis    Acute kidney failure (HCC)    Alcohol abuse    Tobacco abuse    Heart failure, systolic, due to idiopathic cardiomyopathy ([facility] )    Ileostomy in place ([facility] [address] )    Encephalopathy    Dehydration    Sepsis ([facility] [address] )    Suspected left lower lobe pneumonia   -Cefepime day#4 today, change to PO therapy in am to Ceftin   -Pt has been afebrile for 24 hours   -Blood cultures are negative x 4 days   -Pro calcitonin is trending down     History of a fib   -Continue amiodarone     Chronic respiratory failure   -Continue 2L NC   -At baseline     COPD without exacerbation   -Continue Symbicort and Spiriva prn albuterol as needed     Hypotension now resolved   -Hold all home BP meds   -Start weaning down on solucortef to Q12 hours     Alcohol dependence   -Start folic acid, thiamine    History of crohns disease sp colectomy sp perforated transverse colon now with illeostomy and high put   -Colorectal surgery is following   -Continue Imodium and metamucil     History of CAD   -Continue aspirin and statin     Urine culture with Candida   -Hernandez has been discontinued     Nicotine dependence   -Nicotine patches     Disposition: Continued care, will need placement STR at dc       Subjective: Pt was seen and examined this morning  He reported that he has abdominal pain and was looking forward to his tramadol  Denied any other concerns  Reported that last time he drank alcohol was 1 month ago  Reported that he would have to think about going to rehab          Vitals: Temp (24hrs), Av 4 °F (36 9 °C), Min:98 3 °F (36 8 °C), Max:98 5 °F (36 9 °C)  Current: Temperature: 98 5 °F (36 9 °C)  Vitals:    18 1757 18 1930 18 2300 18 0558   BP:       BP Location:       Pulse: 96 (!) 106    Resp: 20  18    Temp:   98 5 °F (36 9 °C)    TempSrc:   Oral    SpO2: 92% 94% 93%    Weight:    70 2 kg (154 lb 12 2 oz)   Height:        Body mass index is 24 24 kg/m²  I/O last 24 hours: In: 2396 6 [P O :990; I V :1306 6; IV Piggyback:100]  Out: 2645 [Urine:495; Stool:2150]    Physical Exam   Constitutional: He appears well-developed and well-nourished  No distress  HENT:   Head: Normocephalic and atraumatic  Eyes: Conjunctivae are normal  Right eye exhibits no discharge  Left eye exhibits no discharge  Neck: Neck supple  No JVD present  Cardiovascular: Normal rate and regular rhythm  No murmur heard  Pulmonary/Chest: No respiratory distress  He has no wheezes  Abdominal: Soft  He exhibits no distension  There is no tenderness  There is no rebound and no guarding  Ostomy with green bm   Musculoskeletal: Normal range of motion  He exhibits no edema  Neurological: He is alert  No cranial nerve deficit  Oriented x 2, thought that it was July 1[birth year] 1988 but was redirectable  Skin: Skin is warm and dry  No rash noted  He is not diaphoretic  No erythema  Invasive Devices     Peripheral Intravenous Line            Peripheral IV 06/14/18 Right Forearm 2 days          Drain            Colostomy  RUQ 25 days                          Labs: No results found for this or any previous visit (from the past 24 hour(s))  Radiology Results: I have personally reviewed pertinent reports  Other Diagnostic Testing:   I have personally reviewed pertinent reports          Active Meds:   Current Facility-Administered Medications   Medication Dose Route Frequency    acetaminophen (TYLENOL) tablet 650 mg  650 mg Oral Q6H PRN    albuterol (PROVENTIL HFA,VENTOLIN HFA) inhaler 2 puff  2 puff Inhalation Q4H PRN    albuterol inhalation solution 2 5 mg  2 5 mg Nebulization Q4H PRN    amiodarone tablet 200 mg  200 mg Oral Daily    aspirin chewable tablet 81 mg  81 mg Oral Daily    atorvastatin (LIPITOR) tablet 40 mg  40 mg Oral Daily With Dinner    budesonide-formoterol (SYMBICORT) 160-4 5 mcg/act inhaler 2 puff  2 puff Inhalation BID    cefepime (MAXIPIME) IVPB (premix) 1,000 mg  1,000 mg Intravenous U91T    folic acid (FOLVITE) tablet 1 mg  1 mg Oral Daily    heparin (porcine) subcutaneous injection 5,000 Units  5,000 Units Subcutaneous Q8H [facility]    hydrocortisone sodium succinate (PF) (Solu-CORTEF) injection 25 mg  25 mg Intravenous Q8H [facility]    loperamide (IMODIUM) capsule 2 mg  2 mg Oral BID    magnesium oxide (MAG-OX) tablet 400 mg  400 mg Oral BID    nicotine (NICODERM CQ) 14 mg/24hr TD 24 hr patch 1 patch  1 patch Transdermal Daily    ondansetron (ZOFRAN) injection 4 mg  4 mg Intravenous Q6H PRN    psyllium (METAMUCIL) 1 packet  1 packet Oral 4x Daily    sodium chloride 0 9 % infusion  100 mL/hr Intravenous Continuous    thiamine (VITAMIN B1) tablet 100 mg  100 mg Oral Daily    tiotropium (SPIRIVA) capsule for inhaler 18 mcg  18 mcg Inhalation Daily    traMADol (ULTRAM) tablet 50 mg  50 mg Oral Q6H PRN    vitamin A capsule 30,000 Units  30,000 Units Oral Daily         VTE Pharmacologic Prophylaxis: Heparin  VTE Mechanical Prophylaxis: sequential compression device    Korin Woody DO

## 2018-06-17 NOTE — RESTORATIVE TECHNICIAN NOTE
Restorative Specialist Mobility Note       Activity: Bedrest, Dangle, Stand at bedside, Turn, Ambulate in room, Ambulate in gleason     Assistive Device: Front wheel walker     Ambulation Response:  Tolerated fairly well

## 2018-06-17 NOTE — PROGRESS NOTES
Paged SOD B about patients mentation  Pt able to state his name, that he was in the hospital, and the year  However he did not know the month and stated that he "heard the birds singing in the walls"  Patient seems anxious and fidgety  Vitals are stable  No further orders at this time

## 2018-06-17 NOTE — PROGRESS NOTES
Progress Note - General Surgery   Bbobi Nguyen 66 y o  male MRN: [id number]  Unit/Bed#: [facility] 503-01 Encounter: [id number]    Assessment:  66yM presenting with hypotension 2/2 high ileostomy output  Hypotension resolved but has continued high output from ileostomy    Plan:  Cardiac diet as tolerated  Cont ostomy output replacements 0 5:1  Continue imodium  Increase metamucil to QID  Strict I&Os  Wet to dry dressings to abdominal wall wound  Rest of care per primary team    Subjective/Objective   Subjective: No acute events overnight  Continued high ostomy output    Objective:     Blood pressure 117/74, pulse (!) 106, temperature 98 5 °F (36 9 °C), temperature source Oral, resp  rate 18, height 5' 7" (1 702 m), weight 69 kg (152 lb 1 9 oz), SpO2 93 %  ,Body mass index is 23 82 kg/m²  Intake/Output Summary (Last 24 hours) at 06/17/18 0507  Last data filed at 06/17/18 0433   Gross per 24 hour   Intake          2021 49 ml   Output             2645 ml   Net          -623 51 ml       Invasive Devices     Peripheral Intravenous Line            Peripheral IV 06/14/18 Right Forearm 2 days          Drain            Colostomy  RUQ 25 days                Physical Exam:     Gen: NAD, AAOx3  CV: RRR  Pulm: nonlabored respirations on RA  Abd: Soft, non-distended, tender L abdomen  Ostomy with brown stool and air in bag  Incision with open wound - clean base    Lab, Imaging and other studies:I have personally reviewed pertinent lab results    , CBC:   No results found for: WBC, HGB, HCT, MCV, PLT, ADJUSTEDWBC, MCH, MCHC, RDW, MPV, NRBC, CMP:   No results found for: NA, K, CL, CO2, ANIONGAP, BUN, CREATININE, GLUCOSE, CALCIUM, AST, ALT, ALKPHOS, PROT, ALBUMIN, BILITOT, EGFR  VTE Pharmacologic Prophylaxis: Heparin  VTE Mechanical Prophylaxis: sequential compression device

## 2018-06-18 LAB
ANION GAP SERPL CALCULATED.3IONS-SCNC: 8 MMOL/L (ref 4–13)
BACTERIA BLD CULT: NORMAL
BACTERIA BLD CULT: NORMAL
BASOPHILS # BLD AUTO: 0.03 THOUSANDS/ΜL (ref 0–0.1)
BASOPHILS NFR BLD AUTO: 0 % (ref 0–1)
BUN SERPL-MCNC: 11 MG/DL (ref 5–25)
CALCIUM SERPL-MCNC: 8.3 MG/DL (ref 8.3–10.1)
CHLORIDE SERPL-SCNC: 105 MMOL/L (ref 100–108)
CO2 SERPL-SCNC: 28 MMOL/L (ref 21–32)
CREAT SERPL-MCNC: 0.8 MG/DL (ref 0.6–1.3)
EOSINOPHIL # BLD AUTO: 0.05 THOUSAND/ΜL (ref 0–0.61)
EOSINOPHIL NFR BLD AUTO: 0 % (ref 0–6)
ERYTHROCYTE [DISTWIDTH] IN BLOOD BY AUTOMATED COUNT: 16.2 % (ref 11.6–15.1)
ERYTHROCYTE [DISTWIDTH] IN BLOOD BY AUTOMATED COUNT: 16.2 % (ref 11.6–15.1)
GFR SERPL CREATININE-BSD FRML MDRD: 100 ML/MIN/1.73SQ M
GLUCOSE SERPL-MCNC: 70 MG/DL (ref 65–140)
HCT VFR BLD AUTO: 30.1 % (ref 36.5–49.3)
HCT VFR BLD AUTO: 30.1 % (ref 36.5–49.3)
HGB BLD-MCNC: 9.4 G/DL (ref 12–17)
HGB BLD-MCNC: 9.4 G/DL (ref 12–17)
IMM GRANULOCYTES # BLD AUTO: 0.4 THOUSAND/UL (ref 0–0.2)
IMM GRANULOCYTES NFR BLD AUTO: 2 % (ref 0–2)
LYMPHOCYTES # BLD AUTO: 2.14 THOUSANDS/ΜL (ref 0.6–4.47)
LYMPHOCYTES NFR BLD AUTO: 10 % (ref 14–44)
MAGNESIUM SERPL-MCNC: 1.4 MG/DL (ref 1.6–2.6)
MCH RBC QN AUTO: 30.8 PG (ref 26.8–34.3)
MCH RBC QN AUTO: 30.8 PG (ref 26.8–34.3)
MCHC RBC AUTO-ENTMCNC: 31.2 G/DL (ref 31.4–37.4)
MCHC RBC AUTO-ENTMCNC: 31.2 G/DL (ref 31.4–37.4)
MCV RBC AUTO: 99 FL (ref 82–98)
MCV RBC AUTO: 99 FL (ref 82–98)
MONOCYTES # BLD AUTO: 1.88 THOUSAND/ΜL (ref 0.17–1.22)
MONOCYTES NFR BLD AUTO: 8 % (ref 4–12)
NEUTROPHILS # BLD AUTO: 17.88 THOUSANDS/ΜL (ref 1.85–7.62)
NEUTS SEG NFR BLD AUTO: 80 % (ref 43–75)
NRBC BLD AUTO-RTO: 0 /100 WBCS
PHOSPHATE SERPL-MCNC: 3.1 MG/DL (ref 2.7–4.5)
PLATELET # BLD AUTO: 377 THOUSANDS/UL (ref 149–390)
PLATELET # BLD AUTO: 377 THOUSANDS/UL (ref 149–390)
PMV BLD AUTO: 9.8 FL (ref 8.9–12.7)
PMV BLD AUTO: 9.8 FL (ref 8.9–12.7)
POTASSIUM SERPL-SCNC: 3.8 MMOL/L (ref 3.5–5.3)
RBC # BLD AUTO: 3.05 MILLION/UL (ref 3.88–5.62)
RBC # BLD AUTO: 3.05 MILLION/UL (ref 3.88–5.62)
SODIUM SERPL-SCNC: 141 MMOL/L (ref 136–145)
WBC # BLD AUTO: 21.75 THOUSAND/UL (ref 4.31–10.16)
WBC # BLD AUTO: 21.75 THOUSAND/UL (ref 4.31–10.16)

## 2018-06-18 PROCEDURE — 85027 COMPLETE CBC AUTOMATED: CPT | Performed by: INTERNAL MEDICINE

## 2018-06-18 PROCEDURE — 87493 C DIFF AMPLIFIED PROBE: CPT | Performed by: INTERNAL MEDICINE

## 2018-06-18 PROCEDURE — 97530 THERAPEUTIC ACTIVITIES: CPT

## 2018-06-18 PROCEDURE — 85025 COMPLETE CBC W/AUTO DIFF WBC: CPT | Performed by: INTERNAL MEDICINE

## 2018-06-18 PROCEDURE — 84100 ASSAY OF PHOSPHORUS: CPT | Performed by: INTERNAL MEDICINE

## 2018-06-18 PROCEDURE — 97110 THERAPEUTIC EXERCISES: CPT

## 2018-06-18 PROCEDURE — 80048 BASIC METABOLIC PNL TOTAL CA: CPT | Performed by: INTERNAL MEDICINE

## 2018-06-18 PROCEDURE — 97116 GAIT TRAINING THERAPY: CPT

## 2018-06-18 PROCEDURE — 94640 AIRWAY INHALATION TREATMENT: CPT

## 2018-06-18 PROCEDURE — 94760 N-INVAS EAR/PLS OXIMETRY 1: CPT

## 2018-06-18 PROCEDURE — 83735 ASSAY OF MAGNESIUM: CPT | Performed by: INTERNAL MEDICINE

## 2018-06-18 RX ORDER — MAGNESIUM SULFATE HEPTAHYDRATE 40 MG/ML
2 INJECTION, SOLUTION INTRAVENOUS ONCE
Status: COMPLETED | OUTPATIENT
Start: 2018-06-18 | End: 2018-06-18

## 2018-06-18 RX ORDER — OXYCODONE HYDROCHLORIDE 5 MG/1
5 TABLET ORAL EVERY 6 HOURS PRN
Status: DISCONTINUED | OUTPATIENT
Start: 2018-06-18 | End: 2018-06-26

## 2018-06-18 RX ORDER — ACETAMINOPHEN 325 MG/1
325 TABLET ORAL EVERY 8 HOURS SCHEDULED
Status: DISCONTINUED | OUTPATIENT
Start: 2018-06-18 | End: 2018-06-28 | Stop reason: HOSPADM

## 2018-06-18 RX ORDER — TRAMADOL HYDROCHLORIDE 50 MG/1
50 TABLET ORAL EVERY 12 HOURS
Status: DISCONTINUED | OUTPATIENT
Start: 2018-06-18 | End: 2018-06-28 | Stop reason: HOSPADM

## 2018-06-18 RX ORDER — LOPERAMIDE HYDROCHLORIDE 2 MG/1
4 CAPSULE ORAL
Status: DISCONTINUED | OUTPATIENT
Start: 2018-06-18 | End: 2018-06-20

## 2018-06-18 RX ADMIN — CEFEPIME HYDROCHLORIDE 1000 MG: 1 INJECTION, SOLUTION INTRAVENOUS at 19:48

## 2018-06-18 RX ADMIN — PSYLLIUM HUSK 1 PACKET: 3.4 POWDER ORAL at 18:01

## 2018-06-18 RX ADMIN — OXYCODONE HYDROCHLORIDE 5 MG: 5 TABLET ORAL at 22:04

## 2018-06-18 RX ADMIN — AMIODARONE HYDROCHLORIDE 200 MG: 200 TABLET ORAL at 08:49

## 2018-06-18 RX ADMIN — ALBUTEROL SULFATE 2.5 MG: 2.5 SOLUTION RESPIRATORY (INHALATION) at 14:03

## 2018-06-18 RX ADMIN — Medication 100 MG: at 08:49

## 2018-06-18 RX ADMIN — OXYCODONE HYDROCHLORIDE 5 MG: 5 TABLET ORAL at 11:27

## 2018-06-18 RX ADMIN — Medication 30000 UNITS: at 08:50

## 2018-06-18 RX ADMIN — TIOTROPIUM BROMIDE 18 MCG: 18 CAPSULE ORAL; RESPIRATORY (INHALATION) at 08:57

## 2018-06-18 RX ADMIN — CEFEPIME HYDROCHLORIDE 1000 MG: 1 INJECTION, SOLUTION INTRAVENOUS at 09:02

## 2018-06-18 RX ADMIN — ACETAMINOPHEN 325 MG: 325 TABLET ORAL at 13:51

## 2018-06-18 RX ADMIN — HEPARIN SODIUM 5000 UNITS: 5000 INJECTION, SOLUTION INTRAVENOUS; SUBCUTANEOUS at 06:17

## 2018-06-18 RX ADMIN — NICOTINE 1 PATCH: 14 PATCH, EXTENDED RELEASE TRANSDERMAL at 08:52

## 2018-06-18 RX ADMIN — TRAMADOL HYDROCHLORIDE 50 MG: 50 TABLET, FILM COATED ORAL at 00:28

## 2018-06-18 RX ADMIN — FOLIC ACID 1 MG: 1 TABLET ORAL at 08:49

## 2018-06-18 RX ADMIN — PSYLLIUM HUSK 1 PACKET: 3.4 POWDER ORAL at 08:49

## 2018-06-18 RX ADMIN — MAGNESIUM SULFATE HEPTAHYDRATE 2 G: 40 INJECTION, SOLUTION INTRAVENOUS at 10:08

## 2018-06-18 RX ADMIN — BUDESONIDE AND FORMOTEROL FUMARATE DIHYDRATE 2 PUFF: 160; 4.5 AEROSOL RESPIRATORY (INHALATION) at 17:45

## 2018-06-18 RX ADMIN — LOPERAMIDE HYDROCHLORIDE 4 MG: 2 CAPSULE ORAL at 17:50

## 2018-06-18 RX ADMIN — LOPERAMIDE HYDROCHLORIDE 4 MG: 2 CAPSULE ORAL at 11:27

## 2018-06-18 RX ADMIN — ATORVASTATIN CALCIUM 40 MG: 40 TABLET, FILM COATED ORAL at 17:50

## 2018-06-18 RX ADMIN — ASPIRIN 81 MG 81 MG: 81 TABLET ORAL at 08:49

## 2018-06-18 RX ADMIN — HEPARIN SODIUM 5000 UNITS: 5000 INJECTION, SOLUTION INTRAVENOUS; SUBCUTANEOUS at 13:51

## 2018-06-18 RX ADMIN — ALBUTEROL SULFATE 2.5 MG: 2.5 SOLUTION RESPIRATORY (INHALATION) at 09:42

## 2018-06-18 RX ADMIN — SODIUM CHLORIDE 50 ML/HR: 0.9 INJECTION, SOLUTION INTRAVENOUS at 04:04

## 2018-06-18 RX ADMIN — TRAMADOL HYDROCHLORIDE 50 MG: 50 TABLET, FILM COATED ORAL at 06:23

## 2018-06-18 RX ADMIN — HYDROCORTISONE SODIUM SUCCINATE 25 MG: 100 INJECTION, POWDER, FOR SOLUTION INTRAMUSCULAR; INTRAVENOUS at 17:49

## 2018-06-18 RX ADMIN — Medication 400 MG: at 17:50

## 2018-06-18 RX ADMIN — HEPARIN SODIUM 5000 UNITS: 5000 INJECTION, SOLUTION INTRAVENOUS; SUBCUTANEOUS at 22:03

## 2018-06-18 RX ADMIN — ALBUTEROL SULFATE 2.5 MG: 2.5 SOLUTION RESPIRATORY (INHALATION) at 19:44

## 2018-06-18 RX ADMIN — TRAMADOL HYDROCHLORIDE 50 MG: 50 TABLET, FILM COATED ORAL at 17:51

## 2018-06-18 RX ADMIN — ACETAMINOPHEN 325 MG: 325 TABLET ORAL at 22:03

## 2018-06-18 RX ADMIN — BUDESONIDE AND FORMOTEROL FUMARATE DIHYDRATE 2 PUFF: 160; 4.5 AEROSOL RESPIRATORY (INHALATION) at 08:57

## 2018-06-18 RX ADMIN — LOPERAMIDE HYDROCHLORIDE 4 MG: 2 CAPSULE ORAL at 06:17

## 2018-06-18 RX ADMIN — Medication 400 MG: at 08:49

## 2018-06-18 RX ADMIN — HYDROCORTISONE SODIUM SUCCINATE 25 MG: 100 INJECTION, POWDER, FOR SOLUTION INTRAMUSCULAR; INTRAVENOUS at 06:17

## 2018-06-18 NOTE — PROGRESS NOTES
Progress Note - General Surgery   Donnie Corrigan 66 y o  male MRN: [id number]  Unit/Bed#: [facility] 503-01 Encounter: [id number]    Assessment:  66yM presenting with hypotension 2/2 high ileostomy output  Hypotension resolved but has continued high output from ileostomy     - colostomy 1 5L (from 2 15)    Plan:  Cardiac diet as tolerated  Cont ostomy output replacements 0 5:1  Continue imodium but will decrease frequency  Increase metamucil to QID  Strict I&Os  Wet to dry dressings to abdominal wall wound  Rest of care per primary team    Subjective/Objective   Subjective:   No acute events  Objective:     Blood pressure 97/62, pulse 88, temperature 97 5 °F (36 4 °C), temperature source Oral, resp  rate 18, height 5' 7" (1 702 m), weight 70 4 kg (155 lb 3 3 oz), SpO2 95 %  ,Body mass index is 24 31 kg/m²  Intake/Output Summary (Last 24 hours) at 06/18/18 0756  Last data filed at 06/18/18 1004   Gross per 24 hour   Intake          2007 99 ml   Output             2295 ml   Net          -287 01 ml       Invasive Devices     Peripheral Intravenous Line            Peripheral IV 06/18/18 Left Arm less than 1 day          Drain            Colostomy  RUQ 26 days                Physical Exam:     Gen: NAD, AAOx3  CV: RRR  Pulm: no resp distress  Abd: Soft, non-distended, tender L abdomen  Ostomy with brown stool and air in bag  Incision with open wound - clean base    Lab, Imaging and other studies:I have personally reviewed pertinent lab results    , CBC:   Lab Results   Component Value Date    WBC 21 75 (H) 06/18/2018    HGB 9 4 (L) 06/18/2018    HCT 30 1 (L) 06/18/2018    MCV 99 (H) 06/18/2018     06/18/2018    MCH 30 8 06/18/2018    MCHC 31 2 (L) 06/18/2018    RDW 16 2 (H) 06/18/2018    MPV 9 8 06/18/2018   , CMP:   Lab Results   Component Value Date     06/18/2018    K 3 8 06/18/2018     06/18/2018    CO2 28 06/18/2018    ANIONGAP 8 06/18/2018    BUN 11 06/18/2018    CREATININE 0 80 06/18/2018 GLUCOSE 70 06/18/2018    CALCIUM 8 3 06/18/2018    EGFR 100 06/18/2018     VTE Pharmacologic Prophylaxis: Heparin  VTE Mechanical Prophylaxis: sequential compression device

## 2018-06-18 NOTE — PROGRESS NOTES
IM Residency Progress Note  Unit/Bed#: [facility] 503-01   Encounter: [id number]  SOD Team B     Kelsey Whitten 66 y o  male   MRN: [id number]  Hospital Stay Days: 5    Assessment/Plan:  Principal Problem:    Hypotension  Active Problems:    Leukocytosis    Acute kidney failure ([facility] [place] [address] )    Alcohol abuse    Tobacco abuse    Heart failure, systolic, due to idiopathic cardiomyopathy ([facility] [place] [address] )    [facility]    Encephalopathy    Dehydration    Sepsis ([facility] )    1  Suspected left lower lobe pneumonia - patient has remained afebrile and respiratory status is at baseline  Worsening leukocytosis on CBC likely secondary to IV Solu-Cortef  Do not suspect worsening infection at this time  Procalcitonin has been trending down  Blood cultures have remained negative  · Will continue on IV cefepime for now given leukocytosis (currently day #5 of antibiotics)  · Plan to change to oral Ceftin tomorrow for 2 more days for a total course of 7 days of antibiotic therapy  · Continue to monitor clinically and fever trends  · If patient does develop fevers or other signs of infection, will repeat infectious workup including blood cultures, urinalysis and culture, chest x-ray, and procalcitonin    2  History of atrial fibrillation - mild tachycardia at times  Heart rates otherwise stable  · Continue amiodarone  · Continue to monitor    3  Chronic respiratory failure - respiratory status is at baseline  Oxygen saturation stable on 2 L nasal cannula  · Continue supplemental oxygen  · Continue to monitor respiratory status  · There is any acute decline suggestive of worsening pneumonia, will initiate panculturing and imaging as noted above    4  COPD without exacerbation - stable  · Continue Symbicort and Spiriva  · Continue as needed albuterol  · Continue to monitor    5  Hypotension - blood pressures have ranged between  systolic and 19-63 diastolic    · Continue to hold all outpatient antihypertensive medications  · Continue IV Solu-Cortef every 12 hours for now and continue to wean tomorrow  · Continue to monitor blood pressures    6  Alcohol dependence - stable  No evidence of withdrawal at this time  · Continue folic acid and thiamine supplementation  · Continue to monitor    7  History of Crohn's disease - status post colectomy with ileostomy for perforated transverse colon  Complicated by high output from stoma  Colostomy has put out 1 5 L over last 24 hours, which is decreased  Does endorse severe abdominal pain at this time  Reports tramadol simply makes a full sleep for 30 minutes and he wakes up with ongoing pain  · Colorectal surgery following  · Continue Imodium and Metamucil  · Will start scheduled oxycodone with tramadol as needed  · Continue Tylenol scheduled at 325 mg every 8 hours  · Reassess pain control tomorrow and adjust analgesic regimen as needed    8  History of CAD - stable  · Continue aspirin and statin therapy    9  Nicotine dependence - stable  · Smoking cessation counseling  · Continue nicotine patch    10  Leukocytosis - white blood cell count up to 21 75 from 13 72 yesterday  Differential indicates neutrophil predominance  Low suspicion for worsening infection at this time as patient appears clinically improved and procalcitonin has been down trending  Blood cultures have remained negative  Could possibly indicate C diff infection given high output from colostomy although more likely secondary to IV Solu-Cortef  · Check C diff from stoma output as well as any from rectum  · Continue to monitor blood counts  · Continue IV antibiotic therapy as noted above      Disposition:  Continued inpatient care  Will need placement at short-term rehab at discharge  Subjective:   Patient seen and examined  No acute events overnight  Reports ongoing severe abdominal pain  States tramadol simply make some fall asleep for 30 minutes and then he wakes up in pain again  Tolerating oral intake  Offers no other acute complaints  Objective:  Vitals: Temp (24hrs), Av 4 °F (36 9 °C), Min:97 5 °F (36 4 °C), Max:99 3 °F (37 4 °C)   Temperature: 97 5 °F (36 4 °C)  Vitals:    18 2236 18 2246 18 2316 18 0600   BP:  97/62     BP Location:       Pulse: 92 (!) 118 88    Resp:  18     Temp:  97 5 °F (36 4 °C)     TempSrc:  Oral     SpO2:  95%     Weight:    70 4 kg (155 lb 3 3 oz)   Height:          Body mass index is 24 31 kg/m²  I/O last 24 hours: In:  [P O :1440; I V :468; IV Piggyback:100]  Out: 8565 [Urine:800; TRCKV:7043]    Physical Exam   Constitutional: He is oriented to person, place, and time  He appears well-developed and well-nourished  No distress  HENT:   Head: Normocephalic and atraumatic  Nose: Nose normal    Mouth/Throat: Oropharynx is clear and moist  No oropharyngeal exudate  Eyes: Conjunctivae and EOM are normal  No scleral icterus  Neck: Neck supple  No JVD present  No tracheal deviation present  Cardiovascular: Normal rate, regular rhythm, normal heart sounds and intact distal pulses  Pulmonary/Chest: Effort normal and breath sounds normal  No respiratory distress  He has no wheezes  He has no rales  Abdominal: Soft  There is tenderness (Diffuse)  There is guarding  There is no rebound  Protuberant abdomen, ostomy appliance in place with stool and gas noted in the collection bag   Musculoskeletal: He exhibits no edema or deformity  Neurological: He is alert and oriented to person, place, and time  No cranial nerve deficit  Skin: Skin is warm and dry  No rash noted  He is not diaphoretic  No erythema  Psychiatric: He has a normal mood and affect  His behavior is normal  Judgment and thought content normal    Nursing note and vitals reviewed        Invasive Devices     Peripheral Intravenous Line            Peripheral IV 18 Left Arm less than 1 day          Drain            Colostomy  RUQ 26 days Labs: I have personally reviewed pertinent reports  Recent Results (from the past 24 hour(s))   CBC    Collection Time: 06/18/18  6:24 AM   Result Value Ref Range    WBC 21 75 (H) 4 31 - 10 16 Thousand/uL    RBC 3 05 (L) 3 88 - 5 62 Million/uL    Hemoglobin 9 4 (L) 12 0 - 17 0 g/dL    Hematocrit 30 1 (L) 36 5 - 49 3 %    MCV 99 (H) 82 - 98 fL    MCH 30 8 26 8 - 34 3 pg    MCHC 31 2 (L) 31 4 - 37 4 g/dL    RDW 16 2 (H) 11 6 - 15 1 %    Platelets 568 027 - 782 Thousands/uL    MPV 9 8 8 9 - 12 7 fL   Basic metabolic panel    Collection Time: 06/18/18  6:24 AM   Result Value Ref Range    Sodium 141 136 - 145 mmol/L    Potassium 3 8 3 5 - 5 3 mmol/L    Chloride 105 100 - 108 mmol/L    CO2 28 21 - 32 mmol/L    Anion Gap 8 4 - 13 mmol/L    BUN 11 5 - 25 mg/dL    Creatinine 0 80 0 60 - 1 30 mg/dL    Glucose 70 65 - 140 mg/dL    Calcium 8 3 8 3 - 10 1 mg/dL    eGFR 100 ml/min/1 73sq m   Phosphorus    Collection Time: 06/18/18  6:24 AM   Result Value Ref Range    Phosphorus 3 1 2 7 - 4 5 mg/dL   Magnesium    Collection Time: 06/18/18  6:24 AM   Result Value Ref Range    Magnesium 1 4 (L) 1 6 - 2 6 mg/dL     Radiology Results: I have personally reviewed pertinent reports  Ct Abdomen Pelvis Wo Contrast    Result Date: 6/13/2018  Impression: Left lower lobe consolidation concerning for pneumonia  Status post bowel surgery without evidence of obstruction or abscess  Stable bilateral nonobstructing intrarenal calculi and complex left renal cyst  Workstation performed: [id number]     Xr Chest Portable    Result Date: 6/15/2018  Impression: Trace left pleural effusion  No pneumothorax  Left basal consolidation may represent atelectasis or pneumonia  Workstation performed: [id number]     Xr Chest 2 Views    Result Date: 6/13/2018  Impression: No acute cardiopulmonary disease  Workstation performed: [id number]     Other Diagnostic Testing: I have personally reviewed pertinent reports        Active Meds:   Current Facility-Administered Medications   Medication Dose Route Frequency    acetaminophen (TYLENOL) tablet 650 mg  650 mg Oral Q6H PRN    albuterol (PROVENTIL HFA,VENTOLIN HFA) inhaler 2 puff  2 puff Inhalation Q4H PRN    albuterol inhalation solution 2 5 mg  2 5 mg Nebulization Q4H PRN    amiodarone tablet 200 mg  200 mg Oral Daily    aspirin chewable tablet 81 mg  81 mg Oral Daily    atorvastatin (LIPITOR) tablet 40 mg  40 mg Oral Daily With Dinner    budesonide-formoterol (SYMBICORT) 160-4 5 mcg/act inhaler 2 puff  2 puff Inhalation BID    cefepime (MAXIPIME) IVPB (premix) 1,000 mg  1,000 mg Intravenous X86V    folic acid (FOLVITE) tablet 1 mg  1 mg Oral Daily    heparin (porcine) subcutaneous injection 5,000 Units  5,000 Units Subcutaneous Q8H [address]    hydrocortisone sodium succinate (PF) (Solu-CORTEF) injection 25 mg  25 mg Intravenous Q12H JUVENTINO    loperamide (IMODIUM) capsule 4 mg  4 mg Oral 4x Daily (AC & HS)    LORazepam (ATIVAN) tablet 1 mg  1 mg Oral Q8H PRN    magnesium oxide (MAG-OX) tablet 400 mg  400 mg Oral BID    nicotine (NICODERM CQ) 14 mg/24hr TD 24 hr patch 1 patch  1 patch Transdermal Daily    ondansetron (ZOFRAN) injection 4 mg  4 mg Intravenous Q6H PRN    psyllium (METAMUCIL) 1 packet  1 packet Oral 4x Daily    sodium chloride 0 9 % infusion  100 mL/hr Intravenous Continuous    thiamine (VITAMIN B1) tablet 100 mg  100 mg Oral Daily    tiotropium (SPIRIVA) capsule for inhaler 18 mcg  18 mcg Inhalation Daily    traMADol (ULTRAM) tablet 50 mg  50 mg Oral Q6H PRN    vitamin A capsule 30,000 Units  30,000 Units Oral Daily       VTE Pharmacologic Prophylaxis: Heparin  VTE Mechanical Prophylaxis: sequential compression device    ==  DIANELYS Irwin    [address] [facility]  Internal Medicine Resident PGY-1

## 2018-06-18 NOTE — PLAN OF CARE
Problem: PHYSICAL THERAPY ADULT  Goal: Performs mobility at highest level of function for planned discharge setting  See evaluation for individualized goals  Treatment/Interventions: Functional transfer training, LE strengthening/ROM, Therapeutic exercise, Endurance training, Patient/family training, Equipment eval/education, Bed mobility, Gait training  Equipment Recommended: Conan Boxer       See flowsheet documentation for full assessment, interventions and recommendations  Outcome: Progressing  Prognosis: Fair  Problem List: Decreased strength, Decreased range of motion, Decreased endurance, Impaired balance, Decreased mobility, Decreased coordination, Decreased cognition, Impaired judgement, Decreased safety awareness, Impaired sensation, Pain  Assessment: pt able to progress to functional ambualtion w/ max encouragement today and tolerated 30' w/ RW and modA; ataxic w/ LOB x1 requiring modA for correction  Self limits and requires increased time to complete all functional tasks  Pt is highly impulsive and displays poor safetya wareness and judgement throughout session and displays very poor carryover during extended session w/ regard to performing xfers w/ safe techniques  Pt is adament about going home on d/c ; however PT is continuing to recommend inpt rehab on d/c as pt requires min>modA w/ all functional tasks and is a  extremely high fall risk  Pt was seated up in chair post session w/ alarm intact and all needs in reach  Barriers to Discharge: Decreased caregiver support     Recommendation: Post acute IP rehab     PT - OK to Discharge:  (to rehab )    See flowsheet documentation for full assessment

## 2018-06-18 NOTE — RESTORATIVE TECHNICIAN NOTE
Restorative Specialist Mobility Note       Pt refused ambulation attempt at this time  Pt noted he is in a lot of pain  Will continue to follow up daily  RN aware        Karen Currie Restorative Technician BS

## 2018-06-18 NOTE — CASE MANAGEMENT
PENDING Sharona Rodríguezayde [id number]  Brenda Aguirre MD Resident Attested Internal Medicine Discharge Summaries Date of Service: 5/31/2018  7:03 PM      Attestation signed by Boom Wiggins DO at 6/3/2018 12:41 PM   Reviewed and accepted  Agree with plan  Needs [facility] follow up in clinic  []Hide copied text  [facility] Discharge Summary - Medical Laurent Osgood 66 y o  male MRN: [id number]  [address] [facility]  Room / Bed: [facility] 631/[facility] 163-25 Encounter: [id number]     BRIEF OVERVIEW     Admitting Provider: Finn Song DO  Discharge Provider:  Boom Wiggins DO  Primary Care Physician at Discharge: Boom Wiggins DO     Discharge To: Home with VNA     Admission Date: 5/15/2018     Discharge Date: 6/1/2018  4:55 PM     Primary Discharge Diagnosis  Principal Problem:    COPD with exacerbation ([address] )  Active Problems:    Colitis    Coronary artery disease involving native coronary artery of native heart    Leukocytosis    Dyslipidemia    Acute kidney failure (HCC)    Alcohol abuse    Tobacco abuse    Alcohol dependence with withdrawal (HCC)    Acute respiratory insufficiency    Agitation    Heart failure, systolic, due to idiopathic cardiomyopathy (HCC)    Presence of ileostomy (HCC)    Moderate protein-calorie malnutrition (HCC)    Anemia    Hypocalcemia    Perforation of colon (HCC)  Resolved Problems:    Shock ([address] )    Lactic acidosis    High anion gap metabolic acidosis    Transaminitis    Elevated troponin    Hypokalemia    Hyponatremia    Bronchospasm, acute    Free intraperitoneal air       Acute hypoxic respiratory failure  · Secondary to COPD exacerbation  · Resolved however patient will require chronic oxygen therapy at home   · Continue  Xopenex and Atrovent treatments, Symbicort  · Continue prednisone taper which will conclude on June 9th 2   Perforated transverse colon  · Patient with history of Crohn's/ulcerative colitis with previous right hemicolectomy  · Will follow up in  week with surgery  · Patient given Tylenol p r n  mild-to-moderate pain, tramadol 100 mg b i d  p r n  for severe pain x2 weeks, gabapentin 400 mg t i d       3   Atrial fibrillation with RVR   · Resolved patient status post cardioversion on May 18th  · Continue amiodarone 200 mg p o  b i d  until June 6, then switch to 200 mg p o  daily  · Continue rate control metoprolol 50 mg daily  · As per Cardiology patient will not need Eliquis unless he develops the PAF  · Will need follow-up with Dr Denis Grant outpatient for repeat echo     4   Systolic congestive heart failure  · Echo on 5/19/2018 showed EF 25% with severe diffuse hypokinesis and grade 2 diastolic dysfunction  · Will need follow-up echo to check for resolution as may be secondary to a stress myopathy/tachycardia mediated cardiomyopathy     5   Coronary artery disease  · History of CAD with stenting in RCA in 2012  · Continue aspirin 81 mg p o  daily, Lipitor 40 mg p o  daily, metoprolol 50 mg daily     6   Alcohol abuse  · Complicated by withdrawal in ICU   · Has been successfully tapered off Valium  · Continue thiamine 100 mg p o  daily and folate 400 mcg p o  daily     7  Hyperglycemia  · Patient A1c 6 3 on 5/24/2018  · Will not require diabetic medications on discharge his blood sugars been elevated secondary to prednisone which will eventually be tapered off on June 9th  · Will control diet and exercise on outpatient basis     8  Hypertension  · BP within normal limits  · Continue metoprolol 50 mg daily           Consulting Providers   Colorectal surgery  Cardiology  Wound Care     Therapeutic Operative Procedures Performed  ileocolectomy, ileocolic anastomosis, and loop ileostomy     Diagnostic Procedures Performed  Ct Chest Abdomen Pelvis Wo Contrast     Result Date: 5/15/2018  Impression: 1    Although exam is limited without intravenous contrast, there appears to be wall thickening centered around the sutures in the hepatic flexure of the colon, with associated inflammatory change and infiltration of the adjacent fat  There may be some involvement of the adjacent descending duodenum  Findings may be related to the history of Crohn's disease/ulcerative colitis  Occult tumor not excluded at this time  Follow-up to resolution recommended  2   Subtle increased ground glass and nodular densities right lung apex may be inflammatory/infectious  CT follow-up in 3-6 months may be considered to ensure resolution  Additional tiny nodular densities bilaterally appear similar  3   Nonobstructing renal calculi  Workstation performed: [id number]      Xr Chest Portable     Result Date: 5/19/2018  Impression: Interval increased interstitial and hazy airspace densities bilaterally  Workstation performed: [id number]      Xr Chest Portable     Result Date: 5/19/2018  Impression: No acute cardiopulmonary disease  Workstation performed: [id number]      Xr Chest Portable     Result Date: 5/19/2018  Impression: No change from 5/17/2018 Workstation performed: [id number]      Xr Chest Portable     Result Date: 5/18/2018  Impression: No acute cardiopulmonary disease  Workstation performed: [id number]      Xr Chest Portable     Result Date: 5/16/2018  Impression: Very small left effusion with bibasilar subsegmental atelectasis Workstation performed: [id number]      Xr Chest Portable     Result Date: 5/15/2018  Impression: No acute cardiopulmonary disease  Findings are stable  Findings are consistent with emergency provider's preliminary reading Workstation performed: [id number]      Xr Chest Portable Icu     Result Date: 5/20/2018  Impression: Improved aeration left lung  Persistent right infrahilar opacity is likely atelectasis   Workstation performed: [id number]        Results from last 7 days  Lab Units 05/30/18  0546 05/29/18  0532 05/28/18  0616   WBC Thousand/uL 18 46* 15 23* 14 69*   HEMOGLOBIN g/dL 10 6* 10 9* 9 9*   HEMATOCRIT % 33 9* 33 5* 31 5*   PLATELETS Thousands/uL 211 208 210   NEUTROS PCT % 88*  --   --    MONOS PCT % 4  --   --    MONO PCT MAN %  --  2*  --         Results from last 7 days  Lab Units 05/28/18  0616   SODIUM mmol/L 140   POTASSIUM mmol/L 4 4   CHLORIDE mmol/L 103   CO2 mmol/L 33*   BUN mg/dL 14   CREATININE mg/dL 0 69   CALCIUM mg/dL 8 2*   GLUCOSE RANDOM mg/dL 138                Lab Results   Component Value Date     PHOS 2 9 05/24/2018     PHOS 2 7 05/23/2018     PHOS 2 7 05/22/2018             0  Lab Value Date/Time   TROPONINI 0 12 (H) 05/20/2018 1753   TROPONINI 0 17 (H) 05/20/2018 1505   TROPONINI 0 17 (H) 05/20/2018 1214   TROPONINI 0 29 (H) 05/19/2018 0438   TROPONINI 0 30 (H) 05/19/2018 0154   TROPONINI 0 32 (H) 05/18/2018 2229   TROPONINI 0 32 (H) 05/18/2018 1909   TROPONINI 1 15 (H) 05/16/2018 1416   TROPONINI 1 24 (H) 05/16/2018 0522   TROPONINI 1 16 (H) 05/16/2018 0209   TROPONINI 1 09 (H) 05/16/2018 0053   TROPONINI 0 85 (H) 05/15/2018 1935   TROPONINI 0 72 (H) 05/15/2018 1728   TROPONINI 0 47 (H) 05/15/2018 1304   TROPONINI 0 02 12/07/2016 1931   TROPONINI 0 04 (H) 12/07/2016 0907   TROPONINI <0 04 05/22/2015 0123          ABG:        Lab Results   Component Value Date     PHART 7 443 05/26/2018     [id number] 46 1 (H) 05/26/2018     PO2ART 72 4 (L) 05/26/2018     [id number] 30 8 (H) 05/26/2018     BEART 5 9 05/26/2018     SOURCE Radial, Left 05/26/2018         Discharge Disposition: Home with [address] [facility] [address]  Discharged With Lines: no  Test Results Pending at Discharge: no     Outpatient Follow-Up  F/u with PCP, cardiology, general surgery  Follow up with consulting providers  Cardiology, pulmonology  Active Issues Requiring Follow-up   Ostomy, CHF, COPD     Code Status: Prior  Advance Directive and Living Will: <no information>  Power of :    POLST:       Medications        Current Discharge Medication List            START taking these medications     Details   VENTOLIN  (90 Base) MCG/ACT inhaler Inhale 2 puffs every 4 (four) hours as needed for wheezing or shortness of breath  Qty: 1 Inhaler, Refills: 6     Associated Diagnoses: COPD (chronic obstructive pulmonary disease) ([place] [address] )                   Current Discharge Medication List            CONTINUE these medications which have NOT CHANGED     Details   albuterol (2 5 mg/3 mL) 0 083 % nebulizer solution USE 1 UNIT DOSE EVERY 4-6 HOURS AS NEEDED FOR WHEEZING   Qty: 150 mL, Refills: 4     Associated Diagnoses: COPD (chronic obstructive pulmonary disease) ([facility])       atorvastatin (LIPITOR) 40 mg tablet Take 1 tablet by mouth daily with dinner for 30 days  Qty: 30 tablet, Refills: 0       gabapentin (NEURONTIN) 300 mg capsule Take 1 capsule (300 mg total) by mouth 3 (three) times a day  Qty: 90 capsule, Refills: 0     Associated Diagnoses: Cervical radiculopathy       lisinopril (ZESTRIL) 2 5 mg tablet Take 1 tablet (2 5 mg total) by mouth daily for 90 days  Qty: 90 tablet, Refills: 0     Associated Diagnoses: Essential hypertension       metoprolol tartrate (LOPRESSOR) 25 mg tablet Take 0 5 tablets (12 5 mg total) by mouth every 12 (twelve) hours for 30 days  Qty: 30 tablet, Refills: 0     Associated Diagnoses: Essential hypertension       Mometasone Furo-Formoterol Fum (DULERA) 200-5 MCG/ACT AERO Inhale 2 puffs every 12 (twelve) hours  Qty: 1 Inhaler, Refills: 3     Associated Diagnoses: Asthma, unspecified asthma severity, unspecified whether complicated, unspecified whether persistent       !! Tiotropium Bromide Monohydrate (SPIRIVA RESPIMAT) 2 5 MCG/ACT AERS Inhale 2 puffs daily       !! Tiotropium Bromide Monohydrate (SPIRIVA RESPIMAT) 2 5 MCG/ACT AERS Inhale 1 Act (2 5 mcg total) daily for 90 days  Qty: 1 Inhaler, Refills: 3     Associated Diagnoses: Chronic obstructive pulmonary disease, unspecified COPD type ([place] [address] )        !! - Potential duplicate medications found   Please discuss with provider           Current Discharge Medication List             Allergies Allergies   Allergen Reactions    Other         Brown cloth band aids          Discharge Diet: cardiac diet  Activity restrictions: none     [address] [facility]  Hussein Bird is a 56 y  o  with a past medical history significant for COPD, hypertension, coronary artery disease status post PCI to RCA in 2012, ulcerative colitis/Crohn's s/p right hemicolectomy, alcohol abuse, and tobacco abuse who presented from home on 5/15/18 with complaints of malaise and weakness as well as incontinence of stool/urine due to inability to get off his couch  The patient had several episodes of emesis daily for at least the past month   He denied any increasing diarrhea or nausea, reports shortness of breath is at baseline, and was drinking at least 1 bottle of kenji a day  In the emergency room he was significantly short of breath and received an extended nebulizer treatment with good effect   He was profoundly hypotensive and was resuscitated with 3 L of crystalloid, and ultimately started on norepinephrine   He had an acute kidney injury, significant metabolic derangements, an elevated troponin, and a lactic acidosis      Patient was admitted to the ICU, and resuscitated with fluids and levophed  His lactic acidosis improved and he was weaned off pressors  His lactic acidosis resolved and the patient was successesfully taken off pressors, maintaining a MAP >65  DEBBY resolved with IV fluid hydration   Antibiotics were continued initially with vancomycin cefepime and flagyl, which were discontinued after the procalcitonin resulted less than 0 5 and blood cultures were negative  The patient also presented with acute encephalopathy secondary to acute alcohol withdrawal and DTs, was started on scheduled Serax with CIWA protocol, encephalopathy resolved and he is currently on scheduled Valium  Thiamine and folate were started   Ammonia was elevated, and lactulose was given for a period of time during ICU stay       During this admission admission, he developed new onset atrial fibrillation with RVR s/p synchronized cardioversion once, s/p Cardizem and digoxin load, and was started on an amiodarone drip  On the day of transfer he has been rate controlled and in sinus rhythm on amiodarone drip  An ECHO was performed which showed a new drop in LVEF to 25% with diffuse hypokinesis with regional variations, new compared to 2016  He had a mild troponin elevation likely secondary to demand ischemia  EKG showed old septal Q waves and ST depressions in inferior leads  With history of inferior MI s/p RCA stent in 2012, aspirin, atorvastatin and metoprolol were continued  He was started on Eliquis for new onset Afib, which was held prior to ex lap  On 5/19 he was intubated due to acute hypoxic respiratory failure secondary to COPD exacerbation with severe bronchospasm  He was extubated on 5/21  He was receiving Xopenex and Atrovent, Pulmicort, and Solumedrol 40 mg every 8 hours, which was decreased to every 12 hours yesterday and daily today  Pulmicort was discontinued today and Symbicort was started  On 5/22 free air was identified under the diaphragm on chest XR in the setting of history of Crohn's disease- colorectal surgery was consulted and he was taken to the OR for an exploratory laparotomy  A perforation was identified in the transverse colon with ischemic bowel, and he had an ileocolectomy, ileocolic anastomosis, and loop ileostomy with placement of ostomy bag and 2 SANTA drains  He was then started on a 5 day course of zosyn  Patient was eventually extubated on 5/23  He improved clinically and was medically cleared for transfer to [facility] on 5/24      On the evening of 5/25, patient was found down on the floor at beside  Patient denied hitting head or LOC, states that he was trying to find candy off the floor   Later that evening, he was consistently found to be desaturating secondary to pulling off his nasal cannula  A continuous pulse ox was applied at that time  On 5/26 he was noted to have increased abdominal pain and leaking around the ostomy site; this was subsequently cared for by the surgical team  He had one SANTA drain removed on 5/28 and the second one removed on 5/29  He finished a 14 day course of steroids on 4/28, and was started on a prednisone taper  He was eventually weaned off nasal cannula, and initially medically cleared for discharge on 5/30 with home VNA services after refusing placement to a SNF  However that day he was found to be periodically desaturating to the mid 80's while on room air  CM subsequently applied for home O2 for the patient         Presenting Problem/History of Present Illness  Principal Problem:    COPD with exacerbation ([address] )  Active Problems:    Colitis    Coronary artery disease involving native coronary artery of native heart    Leukocytosis    Dyslipidemia    Acute kidney failure (HCC)    Alcohol abuse    Tobacco abuse    Alcohol dependence with withdrawal (HCC)    Acute respiratory insufficiency    Agitation    Heart failure, systolic, due to idiopathic cardiomyopathy (HCC)    Presence of ileostomy (HCC)    Moderate protein-calorie malnutrition (HCC)    Anemia    Hypocalcemia    Perforation of colon (HCC)  Resolved Problems:    Shock ([address] )    Lactic acidosis    High anion gap metabolic acidosis    Transaminitis    Elevated troponin    Hypokalemia    Hyponatremia    Bronchospasm, acute    Free intraperitoneal air        Discharge Condition: stable     Discharge  Statement   I spent 30 minutes discharging the patient  This time was spent on the day of discharge  I had direct contact with the patient on the day of discharge   Additional documentation is required if more than 30 minutes were spent on discharge                       Cosigned by: Larry Carrillo DO at 6/3/2018 12:41 PM   Revision History

## 2018-06-18 NOTE — PHYSICAL THERAPY NOTE
PHYSICAL THERAPY TREATMENT       06/18/18 1513   Pain Assessment   Pain Assessment 0-10   Pain Score 7   Pain Type Surgical pain;Acute pain   Pain Location Abdomen   Pain Orientation Mid   Pain Descriptors Aching; Sharp   Pain Frequency Constant/continuous   Clinical Progression Not changed   Patient's Stated Pain Goal No pain   Hospital Pain Intervention(s) Repositioned; Ambulation/increased activity   Response to Interventions tolerated and improved    Restrictions/Precautions   Weight Bearing Precautions Per Order No   Braces or Orthoses (none)   Other Precautions Impulsive;Cognitive; Chair Alarm; Bed Alarm;Multiple lines;Telemetry;O2;Fall Risk;Pain;Hard of hearing   General   Chart Reviewed Yes   Family/Caregiver Present No   Cognition   Overall Cognitive Status Impaired   Arousal/Participation Alert   Attention Attends with cues to redirect   Orientation Level Oriented to person;Oriented to place;Oriented to situation   Memory Decreased recall of precautions;Decreased recall of recent events   Following Commands Follows one step commands without difficulty   Subjective   Subjective "What do I really have to do this crap for?" - pt initially resistant to PT session- Reluctent but agreeable w/ encouragement and education    Bed Mobility   Supine to Sit 4  Minimal assistance   Additional items Assist x 1; Increased time required   Sit to Supine 4  Minimal assistance   Additional items Assist x 1; Impulsive;Verbal cues   Transfers   Sit to Stand 4  Minimal assistance   Additional items Assist x 1; Impulsive;Verbal cues  (hand placement and positioning prior to stand )   Stand to Sit 4  Minimal assistance   Additional items Assist x 1; Impulsive  (required repeat instruction for safety and hand placement)   Stand pivot 3  Moderate assistance   Additional items Assist x 1  (w/ RW- highly impulsive- max cues for safety)   Ambulation/Elevation   Gait pattern Improper Weight shift;Narrow HANNAH; Ataxia; Step to;Shuffling;Decreased foot clearance   Gait Assistance 3  Moderate assist   Additional items Assist x 1   Assistive Device Rolling walker   Distance 30'x1; w/ RW and min/modA for LOB x1- pt required max cues for sequencing of RW and for widening HANNAH  Pt w/ ataxic gait; poor foot clearance; and noted hand tremor on RW during ambulation  recommend chair follow for progression    Balance   Static Sitting Fair   Dynamic Sitting Fair -   Static Standing Poor +   Dynamic Standing Poor   Ambulatory Poor   Endurance Deficit   Endurance Deficit Yes   Endurance Deficit Description significant fatigue; LE instabiltiy; tremor; weakness adn deconditioning    Activity Tolerance   Activity Tolerance Patient limited by fatigue;Patient limited by pain   Medical Staff Made Aware s/w RN and CM    Nurse Made Aware RNBryan Godwin   Exercises   Heelslides Supine;15 reps;Right;Left   Hip Flexion Standing;10 reps  (x2 w/ UE support on RW)   Hip Abduction Standing;5 reps;Right;Left  (x2 w/ UE support on RW)   Balance training  sit< stands from bed surface w/ standing therex min>modA w/ seated rest b/t trials (4 stand total)    Assessment   Prognosis Fair   Problem List Decreased strength;Decreased range of motion;Decreased endurance; Impaired balance;Decreased mobility; Decreased coordination;Decreased cognition; Impaired judgement;Decreased safety awareness; Impaired sensation;Pain   Assessment pt able to progress to functional ambualtion w/ max encouragement today and tolerated 30' w/ RW and modA; ataxic w/ LOB x1 requiring modA for correction  Self limits and requires increased time to complete all functional tasks  Pt is highly impulsive and displays poor safetya wareness and judgement throughout session and displays very poor carryover during extended session w/ regard to performing xfers w/ safe techniques   Pt is adament about going home on d/c ; however PT is continuing to recommend inpt rehab on d/c as pt requires min>modA w/ all functional tasks and is a  extremely high fall risk  Pt was seated up in chair post session w/ alarm intact and all needs in reach  Barriers to Discharge Decreased caregiver support   Goals   Patient Goals go home    STG Expiration Date 06/24/18   Treatment Day 1   Plan   Treatment/Interventions Functional transfer training;LE strengthening/ROM; Therapeutic exercise; Endurance training;Cognitive reorientation;Patient/family training;Equipment eval/education; Bed mobility;Gait training;Spoke to nursing;Spoke to case management   PT Frequency (3-5x/week )   Recommendation   Recommendation Post acute IP rehab   Equipment Recommended Saba Redder; Wheelchair   PT - OK to Discharge (to rehab )     Avery Favre, PT

## 2018-06-18 NOTE — PLAN OF CARE
GASTROINTESTINAL - ADULT     Maintains or returns to baseline bowel function Progressing     Establish and maintain optimal ostomy function Progressing

## 2018-06-19 LAB
ANION GAP SERPL CALCULATED.3IONS-SCNC: 13 MMOL/L (ref 4–13)
BASOPHILS # BLD AUTO: 0.02 THOUSANDS/ΜL (ref 0–0.1)
BASOPHILS NFR BLD AUTO: 0 % (ref 0–1)
BUN SERPL-MCNC: 11 MG/DL (ref 5–25)
C DIFF TOX GENS STL QL NAA+PROBE: NORMAL
CA-I BLD-SCNC: 1.1 MMOL/L (ref 1.12–1.32)
CALCIUM SERPL-MCNC: 7 MG/DL (ref 8.3–10.1)
CHLORIDE SERPL-SCNC: 104 MMOL/L (ref 100–108)
CO2 SERPL-SCNC: 21 MMOL/L (ref 21–32)
CREAT SERPL-MCNC: 0.73 MG/DL (ref 0.6–1.3)
EOSINOPHIL # BLD AUTO: 0 THOUSAND/ΜL (ref 0–0.61)
EOSINOPHIL NFR BLD AUTO: 0 % (ref 0–6)
ERYTHROCYTE [DISTWIDTH] IN BLOOD BY AUTOMATED COUNT: 16.2 % (ref 11.6–15.1)
GFR SERPL CREATININE-BSD FRML MDRD: 104 ML/MIN/1.73SQ M
GLUCOSE SERPL-MCNC: 81 MG/DL (ref 65–140)
HCT VFR BLD AUTO: 30 % (ref 36.5–49.3)
HGB BLD-MCNC: 9 G/DL (ref 12–17)
IMM GRANULOCYTES # BLD AUTO: 0.28 THOUSAND/UL (ref 0–0.2)
IMM GRANULOCYTES NFR BLD AUTO: 2 % (ref 0–2)
LYMPHOCYTES # BLD AUTO: 1.15 THOUSANDS/ΜL (ref 0.6–4.47)
LYMPHOCYTES NFR BLD AUTO: 7 % (ref 14–44)
MAGNESIUM SERPL-MCNC: 2.1 MG/DL (ref 1.6–2.6)
MCH RBC QN AUTO: 30.1 PG (ref 26.8–34.3)
MCHC RBC AUTO-ENTMCNC: 30 G/DL (ref 31.4–37.4)
MCV RBC AUTO: 100 FL (ref 82–98)
MONOCYTES # BLD AUTO: 1.12 THOUSAND/ΜL (ref 0.17–1.22)
MONOCYTES NFR BLD AUTO: 7 % (ref 4–12)
NEUTROPHILS # BLD AUTO: 13.94 THOUSANDS/ΜL (ref 1.85–7.62)
NEUTS SEG NFR BLD AUTO: 84 % (ref 43–75)
NRBC BLD AUTO-RTO: 0 /100 WBCS
PLATELET # BLD AUTO: 310 THOUSANDS/UL (ref 149–390)
PMV BLD AUTO: 10.3 FL (ref 8.9–12.7)
POTASSIUM SERPL-SCNC: 4 MMOL/L (ref 3.5–5.3)
RBC # BLD AUTO: 2.99 MILLION/UL (ref 3.88–5.62)
SODIUM SERPL-SCNC: 138 MMOL/L (ref 136–145)
WBC # BLD AUTO: 16.51 THOUSAND/UL (ref 4.31–10.16)

## 2018-06-19 PROCEDURE — 97530 THERAPEUTIC ACTIVITIES: CPT

## 2018-06-19 PROCEDURE — 97535 SELF CARE MNGMENT TRAINING: CPT

## 2018-06-19 PROCEDURE — 83735 ASSAY OF MAGNESIUM: CPT | Performed by: STUDENT IN AN ORGANIZED HEALTH CARE EDUCATION/TRAINING PROGRAM

## 2018-06-19 PROCEDURE — 97116 GAIT TRAINING THERAPY: CPT

## 2018-06-19 PROCEDURE — 94640 AIRWAY INHALATION TREATMENT: CPT

## 2018-06-19 PROCEDURE — 80048 BASIC METABOLIC PNL TOTAL CA: CPT | Performed by: STUDENT IN AN ORGANIZED HEALTH CARE EDUCATION/TRAINING PROGRAM

## 2018-06-19 PROCEDURE — 82330 ASSAY OF CALCIUM: CPT | Performed by: STUDENT IN AN ORGANIZED HEALTH CARE EDUCATION/TRAINING PROGRAM

## 2018-06-19 PROCEDURE — 94760 N-INVAS EAR/PLS OXIMETRY 1: CPT

## 2018-06-19 PROCEDURE — 85025 COMPLETE CBC W/AUTO DIFF WBC: CPT | Performed by: STUDENT IN AN ORGANIZED HEALTH CARE EDUCATION/TRAINING PROGRAM

## 2018-06-19 RX ORDER — CEFUROXIME AXETIL 250 MG/1
500 TABLET ORAL EVERY 12 HOURS SCHEDULED
Status: DISCONTINUED | OUTPATIENT
Start: 2018-06-19 | End: 2018-06-20

## 2018-06-19 RX ADMIN — ACETAMINOPHEN 325 MG: 325 TABLET ORAL at 05:57

## 2018-06-19 RX ADMIN — ALBUTEROL SULFATE 2.5 MG: 2.5 SOLUTION RESPIRATORY (INHALATION) at 19:07

## 2018-06-19 RX ADMIN — CEFEPIME HYDROCHLORIDE 1000 MG: 1 INJECTION, SOLUTION INTRAVENOUS at 09:21

## 2018-06-19 RX ADMIN — TRAMADOL HYDROCHLORIDE 50 MG: 50 TABLET, FILM COATED ORAL at 05:57

## 2018-06-19 RX ADMIN — ACETAMINOPHEN 325 MG: 325 TABLET ORAL at 14:40

## 2018-06-19 RX ADMIN — BUDESONIDE AND FORMOTEROL FUMARATE DIHYDRATE 2 PUFF: 160; 4.5 AEROSOL RESPIRATORY (INHALATION) at 09:21

## 2018-06-19 RX ADMIN — HEPARIN SODIUM 5000 UNITS: 5000 INJECTION, SOLUTION INTRAVENOUS; SUBCUTANEOUS at 21:14

## 2018-06-19 RX ADMIN — PSYLLIUM HUSK 1 PACKET: 3.4 POWDER ORAL at 09:24

## 2018-06-19 RX ADMIN — ALBUTEROL SULFATE 2.5 MG: 2.5 SOLUTION RESPIRATORY (INHALATION) at 13:42

## 2018-06-19 RX ADMIN — HEPARIN SODIUM 5000 UNITS: 5000 INJECTION, SOLUTION INTRAVENOUS; SUBCUTANEOUS at 14:41

## 2018-06-19 RX ADMIN — Medication 30000 UNITS: at 09:23

## 2018-06-19 RX ADMIN — PSYLLIUM HUSK 1 PACKET: 3.4 POWDER ORAL at 21:14

## 2018-06-19 RX ADMIN — Medication 100 MG: at 09:22

## 2018-06-19 RX ADMIN — PSYLLIUM HUSK 1 PACKET: 3.4 POWDER ORAL at 17:40

## 2018-06-19 RX ADMIN — HYDROCORTISONE SODIUM SUCCINATE 25 MG: 100 INJECTION, POWDER, FOR SOLUTION INTRAMUSCULAR; INTRAVENOUS at 05:58

## 2018-06-19 RX ADMIN — LOPERAMIDE HYDROCHLORIDE 4 MG: 2 CAPSULE ORAL at 17:34

## 2018-06-19 RX ADMIN — ALBUTEROL SULFATE 2.5 MG: 2.5 SOLUTION RESPIRATORY (INHALATION) at 07:04

## 2018-06-19 RX ADMIN — ATORVASTATIN CALCIUM 40 MG: 40 TABLET, FILM COATED ORAL at 17:34

## 2018-06-19 RX ADMIN — TIOTROPIUM BROMIDE 18 MCG: 18 CAPSULE ORAL; RESPIRATORY (INHALATION) at 10:58

## 2018-06-19 RX ADMIN — ASPIRIN 81 MG 81 MG: 81 TABLET ORAL at 09:23

## 2018-06-19 RX ADMIN — NICOTINE 1 PATCH: 14 PATCH, EXTENDED RELEASE TRANSDERMAL at 09:22

## 2018-06-19 RX ADMIN — FOLIC ACID 1 MG: 1 TABLET ORAL at 09:23

## 2018-06-19 RX ADMIN — Medication 400 MG: at 17:35

## 2018-06-19 RX ADMIN — BUDESONIDE AND FORMOTEROL FUMARATE DIHYDRATE 2 PUFF: 160; 4.5 AEROSOL RESPIRATORY (INHALATION) at 17:39

## 2018-06-19 RX ADMIN — ACETAMINOPHEN 325 MG: 325 TABLET ORAL at 21:14

## 2018-06-19 RX ADMIN — OXYCODONE HYDROCHLORIDE 5 MG: 5 TABLET ORAL at 21:19

## 2018-06-19 RX ADMIN — AMIODARONE HYDROCHLORIDE 200 MG: 200 TABLET ORAL at 09:24

## 2018-06-19 RX ADMIN — TRAMADOL HYDROCHLORIDE 50 MG: 50 TABLET, FILM COATED ORAL at 17:35

## 2018-06-19 RX ADMIN — Medication 400 MG: at 09:23

## 2018-06-19 RX ADMIN — LOPERAMIDE HYDROCHLORIDE 4 MG: 2 CAPSULE ORAL at 09:23

## 2018-06-19 RX ADMIN — OXYCODONE HYDROCHLORIDE 5 MG: 5 TABLET ORAL at 09:23

## 2018-06-19 RX ADMIN — HEPARIN SODIUM 5000 UNITS: 5000 INJECTION, SOLUTION INTRAVENOUS; SUBCUTANEOUS at 05:57

## 2018-06-19 RX ADMIN — CEFUROXIME AXETIL 500 MG: 250 TABLET ORAL at 21:13

## 2018-06-19 NOTE — PLAN OF CARE
Problem: OCCUPATIONAL THERAPY ADULT  Goal: Performs self-care activities at highest level of function for planned discharge setting  See evaluation for individualized goals  Treatment Interventions: ADL retraining, Functional transfer training, UE strengthening/ROM, Endurance training, Cognitive reorientation, Patient/family training, Equipment evaluation/education, Compensatory technique education, Continued evaluation, Energy conservation, Activityengagement          See flowsheet documentation for full assessment, interventions and recommendations  Outcome: Progressing  Limitation: Decreased ADL status, Decreased UE strength, Decreased Safe judgement during ADL, Decreased endurance, Decreased self-care trans, Decreased high-level ADLs  Prognosis: Fair  Assessment: Patient participated in skilled OT with focus on ADL skill training, transfer skill transfer, activity endurance, functional mobility skills  Patient presents seated in recliner agreeable to engage in therapy  Patient reports "sponge bathing" at home sometimes on "first floor and sometimes on second floor"  Patient reports that "he will not go to rehab"  Patient requires assist levels as documented secondary to decreased activity endurance, decreased funcitonal balance skills with higher level dynamic tasks  Patient would benefit from short term rehab with focus on increasing functional safety with performance of ADL skills, func mobility, activity tolerance for carryover into his daily routine        OT Discharge Recommendation: Short Term Rehab  OT - OK to Discharge: Yes (when medically cleared)   Jackie Torre

## 2018-06-19 NOTE — SOCIAL WORK
Met with the patient to discuss discharge needs and recommendation for snf rehab  Patient reports his wife  in a local snf and he declines to go to a facility  He requests to stay here for rehab  [facility] to [facility] at patient's preference  Patient reports that when he goes home he will have 24 hour help  His daughter lives with him and work 7am-3pm but his 25 year granddaughter will be home with him

## 2018-06-19 NOTE — PLAN OF CARE
Problem: PHYSICAL THERAPY ADULT  Goal: Performs mobility at highest level of function for planned discharge setting  See evaluation for individualized goals  Treatment/Interventions: Functional transfer training, LE strengthening/ROM, Therapeutic exercise, Endurance training, Patient/family training, Equipment eval/education, Bed mobility, Gait training  Equipment Recommended: Saba Redranjana       See flowsheet documentation for full assessment, interventions and recommendations  Outcome: Progressing  Prognosis: Fair  Problem List: Decreased strength, Decreased endurance, Impaired balance, Decreased mobility, Decreased cognition, Decreased safety awareness, Pain  Assessment: pt continues  to  be  impulsive and  argumenetative at times  during tx session   extra time needed  fro  education on need  for mobility  and  safety during  mob    endurance has improved   he was able to amb x3 but needed  seated  rest  stops between   gait is excesively slow c step to pattern  and rw reliant  he  continues to be adament  re  going directly  home   he will need  continuned  PT intervention  for strengthening and  to ensude safe  mobility   Barriers to Discharge: Decreased caregiver support     Recommendation: Post acute IP rehab     PT - OK to Discharge:  (to rehab )    See flowsheet documentation for full assessment

## 2018-06-19 NOTE — OCCUPATIONAL THERAPY NOTE
Occupational Therapy Treatment Note:       06/19/18 7043   Pain Assessment   Pain Assessment 0-10   Pain Score 9   Pain Type Surgical pain   Pain Location Abdomen   ADL   Where Assessed Chair   Eating Assistance 5  Supervision/Setup   Eating Deficit Setup   Grooming Assistance 5  Supervision/Setup   Grooming Deficit Setup   UB Bathing Assistance 4  Minimal Assistance   UB Bathing Deficit Setup;Supervision/safety; Increased time to complete   LB Bathing Assistance 4  Minimal Assistance   LB Bathing Deficit Setup;Steadying;Verbal cueing;Supervision/safety; Increased time to complete   Transfers   Sit to Stand 4  Minimal assistance   Additional items Assist x 1; Armrests; Increased time required;Verbal cues   Stand to Sit 4  Minimal assistance   Additional items Assist x 1; Armrests; Increased time required;Verbal cues   Stand pivot 4  Minimal assistance   Additional items Assist x 1; Increased time required; Impulsive;Verbal cues   Functional Mobility   Functional Mobility 4  Minimal assistance   Additional items Rolling walker   Cognition   Overall Cognitive Status Impaired   Arousal/Participation Alert   Attention Attends with cues to redirect   Orientation Level Oriented to person;Oriented to place;Oriented to time   Memory Decreased recall of recent events;Decreased recall of precautions   Following Commands Follows one step commands with increased time or repetition   Comments cooperative with therapy today; does verbalize "not wanting to go to rehab"   Activity Tolerance   Activity Tolerance Patient limited by fatigue   Medical Staff Made Aware ok to see per CANDICE Laird   Assessment   Assessment Patient participated in skilled OT with focus on ADL skill training, transfer skill transfer, activity endurance, functional mobility skills  Patient presents seated in recliner agreeable to engage in therapy  Patient reports "sponge bathing" at home sometimes on "first floor and sometimes on second floor"   Patient reports that "he will not go to a place like my wife was at"  Patient requires assist levels as documented secondary to decreased activity endurance, decreased funcitonal balance skills with higher level dynamic tasks  Patient would benefit from short term rehab with focus on increasing functional safety with performance of ADL skills, func mobility, activity tolerance for carryover into his daily routine  Plan   Treatment Interventions ADL retraining;Functional transfer training; Endurance training   Goal Expiration Date 06/26/18   Treatment Day 1   OT Frequency 3-5x/wk   Recommendation   OT Discharge Recommendation Short Term Rehab   OT - OK to Discharge Yes  (when medically cleared)   Barthel Index   Feeding 10   Bathing 0   Grooming Score 5   Dressing Score 5   Bladder Score 0   Bowels Score 0   Toilet Use Score 5   Transfers (Bed/Chair) Score 10   Mobility (Level Surface) Score 10   Stairs Score 0   Barthel Index Score 45   Modified Omar Scale   Modified [place] Scale 4   Ladon Boxer, COTA

## 2018-06-19 NOTE — PHYSICAL THERAPY NOTE
Physical Therapy Progress Note     06/19/18 0920   Pain Assessment   Pain Assessment 0-10   Pain Score 9   Pain Type Surgical pain   Pain Location Abdomen   Pain Orientation Mid   Hospital Pain Intervention(s) Repositioned; Ambulation/increased activity   Response to Interventions unchanged  tolerated    Restrictions/Precautions   Weight Bearing Precautions Per Order No   Other Precautions Contact/isolation; Impulsive;Cognitive; Chair Alarm;Multiple lines; Fall Risk;Pain   General   Chart Reviewed Yes   Family/Caregiver Present No   Cognition   Overall Cognitive Status Impaired   Arousal/Participation Alert   Attention Attends with cues to redirect   Orientation Level Oriented to person;Oriented to place;Oriented to time   Following Commands Follows one step commands with increased time or repetition   Subjective   Subjective states that he  has  abd  pain    Bed Mobility   Supine to Sit (recieved pt sitting  in  recliner )   Transfers   Sit to Stand 4  Minimal assistance   Additional items Assist x 1; Armrests; Increased time required; Impulsive;Verbal cues   Stand to Sit 4  Minimal assistance   Additional items Assist x 1;Trapeze bar;Impulsive;Verbal cues   Stand pivot 4  Minimal assistance   Additional items Assist x 1; Armrests; Increased time required; Impulsive   Ambulation/Elevation   Gait pattern Narrow HANNAH; Decreased foot clearance; Short stride; Step to;Excessively slow   Gait Assistance 4  Minimal assist  (contact  guard)   Additional items Assist x 1;Verbal cues; Tactile cues  (second person for chr  follow )   Assistive Device Rolling walker   Distance 40ft 50ft 50ft  (all c seated  rest between )   Balance   Static Sitting Fair   Static Standing Poor +   Ambulatory Poor   Endurance Deficit   Endurance Deficit Yes   Endurance Deficit Description weakness   Activity Tolerance   Activity Tolerance Patient limited by fatigue;Patient limited by pain   Nurse Omid Sotelo rn    Exercises   Heelslides Sitting;20 reps;AROM; Bilateral   Knee AROM Long Arc Quad Sitting;20 reps;AROM; Bilateral   Ankle Pumps Sitting;20 reps;AROM; Bilateral   Assessment   Prognosis Fair   Problem List Decreased strength;Decreased endurance; Impaired balance;Decreased mobility; Decreased cognition;Decreased safety awareness;Pain   Assessment pt continues  to  be  impulsive and  argumenetative at times  during tx session   extra time needed  fro  education on need  for mobility  and  safety during  mob    endurance has improved   he was able to amb x3 but needed  seated  rest  stops between   gait is excesively slow c step to pattern  and rw reliant  he  continues to be adament  re  going directly  home   he will need  continuned  PT intervention  for strengthening and  to ensude safe  mobility    Barriers to Discharge Decreased caregiver support   Goals   Patient Goals go home    STG Expiration Date 06/24/18   Treatment Day 2   Plan   Treatment/Interventions Functional transfer training;LE strengthening/ROM; Patient/family training;Gait training;Spoke to nursing   Progress Slow progress, medical status limitations   PT Frequency (3-5x wk )   Recommendation   Recommendation Post acute IP rehab   Equipment Recommended Lj Dalton; Wheelchair   PT - OK to Discharge (to rehab  when  med ready )     Rogelio Medina, ADONAY

## 2018-06-19 NOTE — PROGRESS NOTES
Progress Note - General Surgery   Camarena  66 y o  male MRN: [id number]  Unit/Bed#: [facility] 503-01 Encounter: [id number]    Assessment:  66yM presenting with hypotension 2/2 high ileostomy output  Hypotension resolved but has continued high output from ileostomy     - colostomy 800ml (from 1 5)  - Decreased imodium  Refusing more than 1 dose of metamucil a day    Plan:  F/u Cdiff  Cardiac diet as tolerated  Cont ostomy output replacements 0 5:1  Continue imodium but will go continue to decrease  Metamucil as patient will allow  Strict I&Os  Wet to dry dressings to abdominal wall wound  Rest of care per primary team    Subjective/Objective   Subjective:   No acute events  Objective:     Blood pressure 97/71, pulse 96, temperature 98 5 °F (36 9 °C), resp  rate 20, height 5' 7" (1 702 m), weight 70 4 kg (155 lb 3 3 oz), SpO2 91 %  ,Body mass index is 24 31 kg/m²  Intake/Output Summary (Last 24 hours) at 06/19/18 0555  Last data filed at 06/19/18 0438   Gross per 24 hour   Intake          1391 67 ml   Output             1625 ml   Net          -233 33 ml       Invasive Devices     Peripheral Intravenous Line            Peripheral IV 06/18/18 Left Arm less than 1 day          Drain            Colostomy  RUQ 27 days                Physical Exam:     Gen: NAD, AAOx3  CV: RRR  Pulm: no resp distress  Abd: Soft, non-distended, non-tender  Ostomy with brown stool       Lab, Imaging and other studies:I have personally reviewed pertinent lab results    , CBC:   Lab Results   Component Value Date    WBC 21 75 (H) 06/18/2018    WBC 21 75 (H) 06/18/2018    HGB 9 4 (L) 06/18/2018    HGB 9 4 (L) 06/18/2018    HCT 30 1 (L) 06/18/2018    HCT 30 1 (L) 06/18/2018    MCV 99 (H) 06/18/2018    MCV 99 (H) 06/18/2018     06/18/2018     06/18/2018    MCH 30 8 06/18/2018    MCH 30 8 06/18/2018    MCHC 31 2 (L) 06/18/2018    MCHC 31 2 (L) 06/18/2018    RDW 16 2 (H) 06/18/2018    RDW 16 2 (H) 06/18/2018    MPV 9 8 06/18/2018    MPV 9 8 06/18/2018    NRBC 0 06/18/2018   , CMP:   Lab Results   Component Value Date     06/18/2018    K 3 8 06/18/2018     06/18/2018    CO2 28 06/18/2018    ANIONGAP 8 06/18/2018    BUN 11 06/18/2018    CREATININE 0 80 06/18/2018    GLUCOSE 70 06/18/2018    CALCIUM 8 3 06/18/2018    EGFR 100 06/18/2018     VTE Pharmacologic Prophylaxis: Heparin  VTE Mechanical Prophylaxis: sequential compression device

## 2018-06-19 NOTE — PLAN OF CARE
DISCHARGE PLANNING - CARE MANAGEMENT     Discharge to post-acute care or home with appropriate resources Progressing        GASTROINTESTINAL - ADULT     Maintains or returns to baseline bowel function Progressing     Establish and maintain optimal ostomy function Progressing        Nutrition/Hydration-ADULT     Nutrient/Hydration intake appropriate for improving, restoring or maintaining nutritional needs Progressing        Potential for Falls     Patient will remain free of falls Progressing        Prexisting or High Potential for Compromised Skin Integrity     Skin integrity is maintained or improved Progressing

## 2018-06-19 NOTE — OCCUPATIONAL THERAPY NOTE
Occupational Therapy Treatment Note:       06/19/18 3193   Pain Assessment   Pain Assessment 0-10   Pain Score 9   Pain Type Surgical pain   Pain Location Abdomen   ADL   Where Assessed Chair   Eating Assistance 5  Supervision/Setup   Eating Deficit Setup   Grooming Assistance 5  Supervision/Setup   Grooming Deficit Setup   UB Bathing Assistance 4  Minimal Assistance   UB Bathing Deficit Setup;Supervision/safety; Increased time to complete   LB Bathing Assistance 4  Minimal Assistance   LB Bathing Deficit Setup;Steadying;Verbal cueing;Supervision/safety; Increased time to complete   Transfers   Sit to Stand 4  Minimal assistance   Additional items Assist x 1; Armrests; Increased time required;Verbal cues   Stand to Sit 4  Minimal assistance   Additional items Assist x 1; Armrests; Increased time required;Verbal cues   Stand pivot 4  Minimal assistance   Additional items Assist x 1; Increased time required; Impulsive;Verbal cues   Functional Mobility   Functional Mobility 4  Minimal assistance   Additional items Rolling walker   Cognition   Overall Cognitive Status Impaired   Arousal/Participation Alert   Attention Attends with cues to redirect   Orientation Level Oriented to person;Oriented to place;Oriented to time   Memory Decreased recall of recent events;Decreased recall of precautions   Following Commands Follows one step commands with increased time or repetition   Comments cooperative with therapy today; does verbalize "not wanting to go to rehab"   Activity Tolerance   Activity Tolerance Patient limited by fatigue   Medical Staff Made Aware ok to see per CANDICE Pepper   Assessment   Assessment Patient participated in skilled OT with focus on ADL skill training, transfer skill transfer, activity endurance, functional mobility skills  Patient presents seated in recliner agreeable to engage in therapy  Patient reports "sponge bathing" at home sometimes on "first floor and sometimes on second floor"   Patient reports that "he will not go to rehab"  Patient requires assist levels as documented secondary to decreased activity endurance, decreased funcitonal balance skills with higher level dynamic tasks  Patient would benefit from short term rehab with focus on increasing functional safety with performance of ADL skills, func mobility, activity tolerance for carryover into his daily routine  Plan   Treatment Interventions ADL retraining;Functional transfer training; Endurance training   Goal Expiration Date 06/26/18   Treatment Day 1   OT Frequency 3-5x/wk   Recommendation   OT Discharge Recommendation Short Term Rehab   OT - OK to Discharge Yes  (when medically cleared)   Barthel Index   Feeding 10   Bathing 0   Grooming Score 5   Dressing Score 5   Bladder Score 0   Bowels Score 0   Toilet Use Score 5   Transfers (Bed/Chair) Score 10   Mobility (Level Surface) Score 10   Stairs Score 0   Barthel Index Score 45   Modified [place] Scale   Modified Omar Scale 4   AMEE Sumner

## 2018-06-19 NOTE — PROGRESS NOTES
IM Residency Progress Note  Unit/Bed#: [facility] 503-01   Encounter: [id number]  SOD Team B     Verito Martinez 66 y o  male   MRN: [id number]  Hospital Stay Days: 6    Assessment/Plan:  Principal Problem:    Hypotension  Active Problems:    Leukocytosis    Acute kidney failure ([facility] )    Alcohol abuse    Tobacco abuse    Heart failure, systolic, due to idiopathic cardiomyopathy ([facility] )    Ileostomy in place ([facility] )    Encephalopathy    Dehydration    Sepsis ([facility] )    1  Suspected left lower lobe pneumonia - patient has remained afebrile and respiratory status is at baseline  Leukocytosis on CBC likely secondary to IV Solu-Cortef  Do not suspect worsening infection at this time  Procalcitonin has been trending down  Blood cultures have remained negative  · Change to oral Ceftin today for 2 more days for a total course of 7 days of antibiotic therapy  · Continue to monitor clinically and fever trends  · Check repeat procalcitonin tomorrow  · If patient does develop fevers or other signs of infection, will repeat infectious workup including blood cultures, urinalysis and culture, chest x-ray, and procalcitonin     2  History of atrial fibrillation - rate normal and stable since yesterday  · Continue amiodarone  · Continue to monitor     3  Chronic respiratory failure - respiratory status at baseline  Oxygen saturation stable on 2 L nasal cannula  · Continue supplemental oxygen  · Continue to monitor respiratory status     4  COPD without exacerbation - stable  · Continue Symbicort and Spiriva  · Continue as needed albuterol  · Continue to monitor     5  Hypotension - blood pressures have ranged between  systolic and 67-59 diastolic  · Continue to hold all outpatient antihypertensive medications  · De-escalate IV Solu-Cortef to every 24 hours and continue to wean  · Continue to monitor blood pressures     6  Alcohol dependence - stable  No evidence of withdrawal at this time    · Continue folic acid and thiamine supplementation  · Continue to monitor     7  History of Crohn's disease - status post colectomy with ileostomy for perforated transverse colon  Complicated by high output from stoma  Output has been steadily decreasing  Ostomy has put out 1 2 L over last 24 hours  Does endorse severe abdominal pain at this time  C diff negative  · Colorectal surgery following  · Continue Imodium and Metamucil  · Continue current analgesic regimen  · Reassess pain control daily and adjust analgesic regimen as needed     8  History of CAD - stable  · Continue aspirin and statin therapy     9  Nicotine dependence - stable  · Smoking cessation counseling  · Continue nicotine patch     10  Leukocytosis - white blood cell count improved to 16 51 today  Differential indicates neutrophil predominance  Suspect secondary to IV Solu-Cortef  No clinical indication of worsening infection  C diff negative  · Continue to monitor blood counts  · Continue IV antibiotic therapy as noted above  · Wean IV Solu-Cortef as noted above    11  Hypomagnesemia, hypocalcemia - magnesium level improved to 2 1 this morning  Calcium on BMP was low at 7  Ionized calcium was mildly low calcium level at 1 1   · Monitor electrolyte levels  · Replete as needed      Disposition:  Continued inpatient care  Anticipate discharge within the next 24-48 hours pending clinical progress  Social work note reviewed  Patient recommended for SNF rehab  Patient requested [facility] referral submitted  Subjective:   Patient seen and examined  No acute events overnight  Reports ongoing abdominal pain  No significant change per patient after adjustments to analgesic regimen  Respiratory status at baseline  Offers no other acute complaints at this time      Objective:  Vitals: Temp (24hrs), Av 5 °F (36 9 °C), Min:98 3 °F (36 8 °C), Max:98 8 °F (37 1 °C)   Temperature: 98 3 °F (36 8 °C)  Vitals:    18 1944 18 2254 18 0600 06/19/18 0708   BP:  97/71  108/64   Pulse:  96  81   Resp:  20  18   Temp:  98 5 °F (36 9 °C)  98 3 °F (36 8 °C)   TempSrc:       SpO2: 95% 91%  93%   Weight:   68 1 kg (150 lb 2 1 oz)    Height:          Body mass index is 23 51 kg/m²  I/O last 24 hours: In: 1391 7 [P O :480; I V :811 7; IV Piggyback:100]  Out: 1625 [Urine:425; Stool:1200]    Physical Exam   Constitutional: He is oriented to person, place, and time  He appears well-developed and well-nourished  No distress  HENT:   Head: Normocephalic and atraumatic  Nose: Nose normal    Mouth/Throat: Oropharynx is clear and moist  No oropharyngeal exudate  Eyes: Conjunctivae and EOM are normal  No scleral icterus  Neck: Neck supple  No JVD present  No tracheal deviation present  Cardiovascular: Normal rate, regular rhythm, normal heart sounds and intact distal pulses  Pulmonary/Chest: Effort normal and breath sounds normal  No respiratory distress  He has no wheezes  He has no rales  Distant breath sounds   Abdominal: There is tenderness  There is guarding  There is no rebound  Protuberant abdomen, ostomy in place with stool and gas in bag   Musculoskeletal: He exhibits no edema or deformity  Neurological: He is alert and oriented to person, place, and time  No cranial nerve deficit  Skin: Skin is warm and dry  No rash noted  He is not diaphoretic  No erythema  Psychiatric: He has a normal mood and affect  His behavior is normal  Judgment and thought content normal    Nursing note and vitals reviewed  Invasive Devices     Peripheral Intravenous Line            Peripheral IV 06/18/18 Left Arm 1 day          Drain            Colostomy  RUQ 27 days                Labs: I have personally reviewed pertinent reports      Recent Results (from the past 24 hour(s))   CBC and differential    Collection Time: 06/19/18  4:30 AM   Result Value Ref Range    WBC 16 51 (H) 4 31 - 10 16 Thousand/uL    RBC 2 99 (L) 3 88 - 5 62 Million/uL Hemoglobin 9 0 (L) 12 0 - 17 0 g/dL    Hematocrit 30 0 (L) 36 5 - 49 3 %     (H) 82 - 98 fL    MCH 30 1 26 8 - 34 3 pg    MCHC 30 0 (L) 31 4 - 37 4 g/dL    RDW 16 2 (H) 11 6 - 15 1 %    MPV 10 3 8 9 - 12 7 fL    Platelets 726 943 - 666 Thousands/uL    nRBC 0 /100 WBCs    Neutrophils Relative 84 (H) 43 - 75 %    Immat GRANS % 2 0 - 2 %    Lymphocytes Relative 7 (L) 14 - 44 %    Monocytes Relative 7 4 - 12 %    Eosinophils Relative 0 0 - 6 %    Basophils Relative 0 0 - 1 %    Neutrophils Absolute 13 94 (H) 1 85 - 7 62 Thousands/µL    Immature Grans Absolute 0 28 (H) 0 00 - 0 20 Thousand/uL    Lymphocytes Absolute 1 15 0 60 - 4 47 Thousands/µL    Monocytes Absolute 1 12 0 17 - 1 22 Thousand/µL    Eosinophils Absolute 0 00 0 00 - 0 61 Thousand/µL    Basophils Absolute 0 02 0 00 - 0 10 Thousands/µL   Basic metabolic panel    Collection Time: 06/19/18  4:30 AM   Result Value Ref Range    Sodium 138 136 - 145 mmol/L    Potassium 4 0 3 5 - 5 3 mmol/L    Chloride 104 100 - 108 mmol/L    CO2 21 21 - 32 mmol/L    Anion Gap 13 4 - 13 mmol/L    BUN 11 5 - 25 mg/dL    Creatinine 0 73 0 60 - 1 30 mg/dL    Glucose 81 65 - 140 mg/dL    Calcium 7 0 (L) 8 3 - 10 1 mg/dL    eGFR 104 ml/min/1 73sq m     Radiology Results: I have personally reviewed pertinent reports  Ct Abdomen Pelvis Wo Contrast    Result Date: 6/13/2018  Impression: Left lower lobe consolidation concerning for pneumonia  Status post bowel surgery without evidence of obstruction or abscess  Stable bilateral nonobstructing intrarenal calculi and complex left renal cyst  Workstation performed: [id number]     Xr Chest Portable    Result Date: 6/15/2018  Impression: Trace left pleural effusion  No pneumothorax  Left basal consolidation may represent atelectasis or pneumonia  Workstation performed: [id number]     Xr Chest 2 Views    Result Date: 6/13/2018  Impression: No acute cardiopulmonary disease   Workstation performed: [id number]     Other Diagnostic Testing: I have personally reviewed pertinent reports  Active Meds:   Current Facility-Administered Medications   Medication Dose Route Frequency    acetaminophen (TYLENOL) tablet 325 mg  325 mg Oral Q8H [facility]    acetaminophen (TYLENOL) tablet 650 mg  650 mg Oral Q6H PRN    albuterol (PROVENTIL HFA,VENTOLIN HFA) inhaler 2 puff  2 puff Inhalation Q4H PRN    albuterol inhalation solution 2 5 mg  2 5 mg Nebulization Q4H PRN    amiodarone tablet 200 mg  200 mg Oral Daily    aspirin chewable tablet 81 mg  81 mg Oral Daily    atorvastatin (LIPITOR) tablet 40 mg  40 mg Oral Daily With Dinner    budesonide-formoterol (SYMBICORT) 160-4 5 mcg/act inhaler 2 puff  2 puff Inhalation BID    cefepime (MAXIPIME) IVPB (premix) 1,000 mg  1,000 mg Intravenous C14E    folic acid (FOLVITE) tablet 1 mg  1 mg Oral Daily    heparin (porcine) subcutaneous injection 5,000 Units  5,000 Units Subcutaneous Q8H [facility]    hydrocortisone sodium succinate (PF) (Solu-CORTEF) injection 25 mg  25 mg Intravenous Q12H JUVENTINO    loperamide (IMODIUM) capsule 4 mg  4 mg Oral BID after meals    LORazepam (ATIVAN) tablet 1 mg  1 mg Oral Q8H PRN    magnesium oxide (MAG-OX) tablet 400 mg  400 mg Oral BID    nicotine (NICODERM CQ) 14 mg/24hr TD 24 hr patch 1 patch  1 patch Transdermal Daily    ondansetron (ZOFRAN) injection 4 mg  4 mg Intravenous Q6H PRN    oxyCODONE (ROXICODONE) IR tablet 5 mg  5 mg Oral Q6H PRN    psyllium (METAMUCIL) 1 packet  1 packet Oral 4x Daily    sodium chloride 0 9 % infusion  100 mL/hr Intravenous Continuous    thiamine (VITAMIN B1) tablet 100 mg  100 mg Oral Daily    tiotropium (SPIRIVA) capsule for inhaler 18 mcg  18 mcg Inhalation Daily    traMADol (ULTRAM) tablet 50 mg  50 mg Oral Q12H    vitamin A capsule 30,000 Units  30,000 Units Oral Daily       VTE Pharmacologic Prophylaxis: Enoxaparin (Lovenox)  VTE Mechanical Prophylaxis: sequential compression device    ==  DIANELYS Wagner    [address] [facility]  Internal Medicine Resident PGY-1

## 2018-06-20 ENCOUNTER — APPOINTMENT (INPATIENT)
Dept: RADIOLOGY | Facility: HOSPITAL | Age: 58
DRG: 720 | End: 2018-06-20
Payer: COMMERCIAL

## 2018-06-20 PROBLEM — N17.9 ACUTE KIDNEY FAILURE (HCC): Status: RESOLVED | Noted: 2018-05-15 | Resolved: 2018-06-20

## 2018-06-20 PROBLEM — E86.0 DEHYDRATION: Status: RESOLVED | Noted: 2018-06-14 | Resolved: 2018-06-20

## 2018-06-20 PROBLEM — A41.9 SEPSIS (HCC): Status: RESOLVED | Noted: 2018-06-14 | Resolved: 2018-06-20

## 2018-06-20 PROBLEM — G93.40 ENCEPHALOPATHY: Status: RESOLVED | Noted: 2018-06-14 | Resolved: 2018-06-20

## 2018-06-20 PROBLEM — I95.9 HYPOTENSION: Status: RESOLVED | Noted: 2018-06-13 | Resolved: 2018-06-20

## 2018-06-20 LAB
ANION GAP SERPL CALCULATED.3IONS-SCNC: 6 MMOL/L (ref 4–13)
BASOPHILS # BLD MANUAL: 0 THOUSAND/UL (ref 0–0.1)
BASOPHILS NFR MAR MANUAL: 0 % (ref 0–1)
BUN SERPL-MCNC: 12 MG/DL (ref 5–25)
CALCIUM SERPL-MCNC: 8.2 MG/DL (ref 8.3–10.1)
CHLORIDE SERPL-SCNC: 103 MMOL/L (ref 100–108)
CO2 SERPL-SCNC: 28 MMOL/L (ref 21–32)
CREAT SERPL-MCNC: 0.71 MG/DL (ref 0.6–1.3)
EOSINOPHIL # BLD MANUAL: 0 THOUSAND/UL (ref 0–0.4)
EOSINOPHIL NFR BLD MANUAL: 0 % (ref 0–6)
ERYTHROCYTE [DISTWIDTH] IN BLOOD BY AUTOMATED COUNT: 16.3 % (ref 11.6–15.1)
GFR SERPL CREATININE-BSD FRML MDRD: 105 ML/MIN/1.73SQ M
GLUCOSE SERPL-MCNC: 63 MG/DL (ref 65–140)
HCT VFR BLD AUTO: 32.9 % (ref 36.5–49.3)
HGB BLD-MCNC: 10 G/DL (ref 12–17)
LYMPHOCYTES # BLD AUTO: 0 % (ref 14–44)
LYMPHOCYTES # BLD AUTO: 0 THOUSAND/UL (ref 0.6–4.47)
MCH RBC QN AUTO: 29.9 PG (ref 26.8–34.3)
MCHC RBC AUTO-ENTMCNC: 30.4 G/DL (ref 31.4–37.4)
MCV RBC AUTO: 99 FL (ref 82–98)
MONOCYTES # BLD AUTO: 1.41 THOUSAND/UL (ref 0–1.22)
MONOCYTES NFR BLD: 6 % (ref 4–12)
NEUTROPHILS # BLD MANUAL: 22.08 THOUSAND/UL (ref 1.85–7.62)
NEUTS BAND NFR BLD MANUAL: 1 % (ref 0–8)
NEUTS SEG NFR BLD AUTO: 93 % (ref 43–75)
NRBC BLD AUTO-RTO: 0 /100 WBCS
PLATELET # BLD AUTO: 354 THOUSANDS/UL (ref 149–390)
PLATELET BLD QL SMEAR: ADEQUATE
PMV BLD AUTO: 9.9 FL (ref 8.9–12.7)
POLYCHROMASIA BLD QL SMEAR: PRESENT
POTASSIUM SERPL-SCNC: 4.3 MMOL/L (ref 3.5–5.3)
PROCALCITONIN SERPL-MCNC: 0.75 NG/ML
RBC # BLD AUTO: 3.34 MILLION/UL (ref 3.88–5.62)
RBC MORPH BLD: PRESENT
SODIUM SERPL-SCNC: 137 MMOL/L (ref 136–145)
WBC # BLD AUTO: 23.49 THOUSAND/UL (ref 4.31–10.16)

## 2018-06-20 PROCEDURE — 94760 N-INVAS EAR/PLS OXIMETRY 1: CPT

## 2018-06-20 PROCEDURE — 85007 BL SMEAR W/DIFF WBC COUNT: CPT | Performed by: STUDENT IN AN ORGANIZED HEALTH CARE EDUCATION/TRAINING PROGRAM

## 2018-06-20 PROCEDURE — 74176 CT ABD & PELVIS W/O CONTRAST: CPT

## 2018-06-20 PROCEDURE — 84145 PROCALCITONIN (PCT): CPT | Performed by: STUDENT IN AN ORGANIZED HEALTH CARE EDUCATION/TRAINING PROGRAM

## 2018-06-20 PROCEDURE — 94640 AIRWAY INHALATION TREATMENT: CPT

## 2018-06-20 PROCEDURE — 85027 COMPLETE CBC AUTOMATED: CPT | Performed by: STUDENT IN AN ORGANIZED HEALTH CARE EDUCATION/TRAINING PROGRAM

## 2018-06-20 PROCEDURE — 71045 X-RAY EXAM CHEST 1 VIEW: CPT

## 2018-06-20 PROCEDURE — 80048 BASIC METABOLIC PNL TOTAL CA: CPT | Performed by: STUDENT IN AN ORGANIZED HEALTH CARE EDUCATION/TRAINING PROGRAM

## 2018-06-20 RX ORDER — LOPERAMIDE HYDROCHLORIDE 2 MG/1
4 CAPSULE ORAL DAILY
Status: DISCONTINUED | OUTPATIENT
Start: 2018-06-21 | End: 2018-06-21

## 2018-06-20 RX ADMIN — PSYLLIUM HUSK 1 PACKET: 3.4 POWDER ORAL at 18:12

## 2018-06-20 RX ADMIN — ACETAMINOPHEN 325 MG: 325 TABLET ORAL at 18:13

## 2018-06-20 RX ADMIN — Medication 400 MG: at 10:43

## 2018-06-20 RX ADMIN — Medication 100 MG: at 11:00

## 2018-06-20 RX ADMIN — ALBUTEROL SULFATE 2.5 MG: 2.5 SOLUTION RESPIRATORY (INHALATION) at 15:26

## 2018-06-20 RX ADMIN — Medication 400 MG: at 18:14

## 2018-06-20 RX ADMIN — PSYLLIUM HUSK 1 PACKET: 3.4 POWDER ORAL at 10:39

## 2018-06-20 RX ADMIN — FOLIC ACID 1 MG: 1 TABLET ORAL at 10:41

## 2018-06-20 RX ADMIN — HYDROCORTISONE SODIUM SUCCINATE 25 MG: 100 INJECTION, POWDER, FOR SOLUTION INTRAMUSCULAR; INTRAVENOUS at 10:40

## 2018-06-20 RX ADMIN — AMIODARONE HYDROCHLORIDE 200 MG: 200 TABLET ORAL at 11:01

## 2018-06-20 RX ADMIN — ATORVASTATIN CALCIUM 40 MG: 40 TABLET, FILM COATED ORAL at 18:13

## 2018-06-20 RX ADMIN — BUDESONIDE AND FORMOTEROL FUMARATE DIHYDRATE 2 PUFF: 160; 4.5 AEROSOL RESPIRATORY (INHALATION) at 10:49

## 2018-06-20 RX ADMIN — Medication 3.38 G: at 18:13

## 2018-06-20 RX ADMIN — HEPARIN SODIUM 5000 UNITS: 5000 INJECTION, SOLUTION INTRAVENOUS; SUBCUTANEOUS at 06:27

## 2018-06-20 RX ADMIN — OXYCODONE HYDROCHLORIDE 5 MG: 5 TABLET ORAL at 22:21

## 2018-06-20 RX ADMIN — NICOTINE 1 PATCH: 14 PATCH, EXTENDED RELEASE TRANSDERMAL at 10:40

## 2018-06-20 RX ADMIN — ACETAMINOPHEN 325 MG: 325 TABLET ORAL at 06:27

## 2018-06-20 RX ADMIN — PSYLLIUM HUSK 1 PACKET: 3.4 POWDER ORAL at 22:21

## 2018-06-20 RX ADMIN — TRAMADOL HYDROCHLORIDE 50 MG: 50 TABLET, FILM COATED ORAL at 06:26

## 2018-06-20 RX ADMIN — BUDESONIDE AND FORMOTEROL FUMARATE DIHYDRATE 2 PUFF: 160; 4.5 AEROSOL RESPIRATORY (INHALATION) at 18:14

## 2018-06-20 RX ADMIN — OXYCODONE HYDROCHLORIDE 5 MG: 5 TABLET ORAL at 10:43

## 2018-06-20 RX ADMIN — TRAMADOL HYDROCHLORIDE 50 MG: 50 TABLET, FILM COATED ORAL at 18:13

## 2018-06-20 RX ADMIN — HEPARIN SODIUM 5000 UNITS: 5000 INJECTION, SOLUTION INTRAVENOUS; SUBCUTANEOUS at 22:21

## 2018-06-20 RX ADMIN — LOPERAMIDE HYDROCHLORIDE 4 MG: 2 CAPSULE ORAL at 10:43

## 2018-06-20 RX ADMIN — ACETAMINOPHEN 325 MG: 325 TABLET ORAL at 22:21

## 2018-06-20 RX ADMIN — CEFUROXIME AXETIL 500 MG: 250 TABLET ORAL at 10:50

## 2018-06-20 RX ADMIN — ASPIRIN 81 MG 81 MG: 81 TABLET ORAL at 10:41

## 2018-06-20 RX ADMIN — TIOTROPIUM BROMIDE 18 MCG: 18 CAPSULE ORAL; RESPIRATORY (INHALATION) at 10:47

## 2018-06-20 RX ADMIN — Medication 30000 UNITS: at 11:01

## 2018-06-20 NOTE — SOCIAL WORK
CM received call from Jean Paul Arguello at [facility], pt approved for admission pending auth and nedical stability  Cm spoke with SOD B and was advised pt needs CXR and CT and could be ready tomorrow  ARC made aware

## 2018-06-20 NOTE — PROGRESS NOTES
Progress Note - General Surgery   Kadeem Pelayo 66 y o  male MRN: [id number]  Unit/Bed#: [facility] 503-01 Encounter: [id number]    Assessment:  66yM presenting with hypotension 2/2 high ileostomy output  Hypotension resolved but has continued high output from ileostomy     - colostomy 1 0ml (from 1 2)    Plan:  Decrease imodium to once daily  Metamucil as patient tolerates  Continue to stay hydrated with PO  Cardiac diet as tolerated  Strict I&Os  Wet to dry dressings to abdominal wall wound  Dispo per primary    Subjective/Objective   Subjective:   No acute events  Objective:     Blood pressure 129/81, pulse 101, temperature 99 3 °F (37 4 °C), temperature source Oral, resp  rate 18, height 5' 7" (1 702 m), weight 67 6 kg (149 lb 0 5 oz), SpO2 95 %  ,Body mass index is 23 34 kg/m²  Intake/Output Summary (Last 24 hours) at 06/20/18 0806  Last data filed at 06/20/18 0601   Gross per 24 hour   Intake          1052 71 ml   Output             1825 ml   Net          -772 29 ml       Invasive Devices     Peripheral Intravenous Line            Peripheral IV 06/18/18 Left Arm 2 days          Drain            Colostomy  RUQ 28 days                Physical Exam:     Gen: NAD, AAOx3  CV: RRR  Pulm: no resp distress  Abd: Soft, non-distended,  Nontender  Brown stool  Lab, Imaging and other studies:I have personally reviewed pertinent lab results    , CBC:   Lab Results   Component Value Date    WBC 23 49 (H) 06/20/2018    HGB 10 0 (L) 06/20/2018    HCT 32 9 (L) 06/20/2018    MCV 99 (H) 06/20/2018     06/20/2018    MCH 29 9 06/20/2018    MCHC 30 4 (L) 06/20/2018    RDW 16 3 (H) 06/20/2018    MPV 9 9 06/20/2018   , CMP:   Lab Results   Component Value Date     06/20/2018    K 4 3 06/20/2018     06/20/2018    CO2 28 06/20/2018    ANIONGAP 6 06/20/2018    BUN 12 06/20/2018    CREATININE 0 71 06/20/2018    GLUCOSE 63 (L) 06/20/2018    CALCIUM 8 2 (L) 06/20/2018    EGFR 105 06/20/2018     VTE Pharmacologic Prophylaxis: Heparin  VTE Mechanical Prophylaxis: sequential compression device

## 2018-06-20 NOTE — PROGRESS NOTES
IM Residency Progress Note   Unit/Bed#: [facility] 503-01 Encounter: [id number]  SOD Team B       Sheeba Tuttle 66 y o  male [id number]    Hospital Stay Days: 7      Assessment/Plan:    Principal Problem:    Hypotension  Active Problems:    Leukocytosis    Acute kidney failure (HCC)    Alcohol abuse    Tobacco abuse    Heart failure, systolic, due to idiopathic cardiomyopathy ([facility] [address] )    Ileostomy in place ([facility] [address] )    Encephalopathy    Dehydration    Sepsis ([facility] [address] )    1  Suspected left lower lobe pneumonia  · Patient was on IV cefepime from 6/14/18-6/19/18, switched to p o  Ceftin 06/19/2018  · Leukocytosis increased from 17 on 06/19/2018 to be 24 on 06/20/2018  · Procalcitonin on 06/20/2018 increased to 0 75  · Patient is currently afebrile with no new symptoms this morning  · Will order chest x-ray to assess for worsening of pneumonia given increases in leukocytosis and procalcitonin    2  History of atrial fibrillation  · Currently in sinus rhythm,  · Rhythm controlled with amiodarone    3  Chronic respiratory failure  · Likely secondary to COPD  · Patient currently saturating at 92% on ambient air  · Goal oxygen saturation 88-92%    4  COPD without exacerbation  · Stable  · Will continue Symbicort and Spiriva and p r n  albuterol HFA    5  Hypotension  · Patient with chronic hypertension, hypotensive on admission likely secondary to high output of ileostomy and possible withdrawal from steroids prior to admission  · Will continue Solu-Cortef with plans to discontinue tomorrow  · Will continue to hold outpatient antihypertensive medications at this time  · Will continue to monitor blood pressure, stable today    6  Alcohol dependence  · Beyond time period for onset of alcohol withdrawal  · Continue folic acid and thiamine supplementation    7    Crohn's disease  · Status post colectomy with ileostomy for perforated transverse colon  · Colorectal surgery following  · High output improving, continue Imodium and Metamucil  · Ileostomy output about 1 5 L in the past 24 hr  · Will order CT abdomen and pelvis to assess for possible leak versus abscess formation    8  CAD  · Continue aspirin and statin therapy  · Stable    9  Nicotine dependent  · Stable, continue nicotine patch    10  Leukocytosis  · Likely multifactorial with possible infection (pneumonia versus GI origin) plus steroid therapy  · White blood cell count increased from 17 to 24 in the past 24 hr    11  Electrolyte abnormalities  · Patient with hypomagnesemia and hypocalcemia which had both improved since admission  · Will continue to monitor electrolyte levels and replete as necessary    Disposition:  Continue inpatient care  Case management is working on placement into short-term rehab/ARC  Subjective:   Patient examined at bedside  RN states no acute events overnight  Patient states he is Kiersten Harris this morning but states that he has abdominal pain and a cough, both of which are chronic  Patient states that his ileostomy output has decreased since admission  Patient currently denies any fever, shortness of breath, chest pain, headache, lightheadedness, dizziness, confusion  All other review of systems negative at this time       Vitals: Temp (24hrs), Av 7 °F (37 1 °C), Min:98 °F (36 7 °C), Max:99 3 °F (37 4 °C)  Current: Temperature: 98 9 °F (37 2 °C)  Vitals:    18 1907 18 2350 18 0600 18 0808   BP:  129/81  136/77   BP Location:    Right arm   Pulse:  101  102   Resp:  18  18   Temp:  99 3 °F (37 4 °C)  98 9 °F (37 2 °C)   TempSrc:  Oral  Oral   SpO2: 94% 95%  92%   Weight:   67 6 kg (149 lb 0 5 oz)    Height:        Body mass index is 23 34 kg/m²  I/O last 24 hours: In: 1052 7 [P O :790; I V :212 7; IV Piggyback:50]  Out:  [Urine:550; TXAVH:0090]      Physical Exam   Constitutional: He is oriented to person, place, and time  He appears well-developed and well-nourished     53-year-old male, appears older than stated age, BMI 21   HENT:   Head: Normocephalic and atraumatic  Mouth/Throat: No oropharyngeal exudate  Eyes: Conjunctivae are normal  No scleral icterus  Neck: No JVD present  No thyromegaly present  Cardiovascular: Normal rate, regular rhythm, normal heart sounds and intact distal pulses  Exam reveals no gallop and no friction rub  No murmur heard  Pulmonary/Chest: Effort normal    Patient with decreased breath sounds, expiratory wheezes throughout   Abdominal: Soft  Bowel sounds are normal  He exhibits no distension  There is tenderness  There is guarding  There is no rebound  Ostomy in place with gas and bowel present   Musculoskeletal: Normal range of motion  He exhibits no edema  Lymphadenopathy:     He has no cervical adenopathy  Neurological: He is alert and oriented to person, place, and time  No cranial nerve deficit  He exhibits normal muscle tone  Skin: Skin is warm and dry  No rash noted  No erythema  Psychiatric: He has a normal mood and affect  His behavior is normal    Nursing note and vitals reviewed          Invasive Devices     Peripheral Intravenous Line            Peripheral IV 06/18/18 Left Arm 2 days          Drain            Colostomy  RUQ 28 days                          Labs:   Recent Results (from the past 24 hour(s))   Calcium, ionized    Collection Time: 06/19/18  9:53 AM   Result Value Ref Range    Calcium, Ionized 1 10 (L) 1 12 - 1 32 mmol/L   CBC and differential    Collection Time: 06/20/18  5:16 AM   Result Value Ref Range    WBC 23 49 (H) 4 31 - 10 16 Thousand/uL    RBC 3 34 (L) 3 88 - 5 62 Million/uL    Hemoglobin 10 0 (L) 12 0 - 17 0 g/dL    Hematocrit 32 9 (L) 36 5 - 49 3 %    MCV 99 (H) 82 - 98 fL    MCH 29 9 26 8 - 34 3 pg    MCHC 30 4 (L) 31 4 - 37 4 g/dL    RDW 16 3 (H) 11 6 - 15 1 %    MPV 9 9 8 9 - 12 7 fL    Platelets 790 167 - 806 Thousands/uL    nRBC 0 /100 WBCs   Basic metabolic panel    Collection Time: 06/20/18  5:16 AM   Result Value Ref Range Sodium 137 136 - 145 mmol/L    Potassium 4 3 3 5 - 5 3 mmol/L    Chloride 103 100 - 108 mmol/L    CO2 28 21 - 32 mmol/L    Anion Gap 6 4 - 13 mmol/L    BUN 12 5 - 25 mg/dL    Creatinine 0 71 0 60 - 1 30 mg/dL    Glucose 63 (L) 65 - 140 mg/dL    Calcium 8 2 (L) 8 3 - 10 1 mg/dL    eGFR 105 ml/min/1 73sq m   Procalcitonin    Collection Time: 06/20/18  5:16 AM   Result Value Ref Range    Procalcitonin 0 75 (H) <=0 25 ng/ml   Manual Differential(PHLEBS Do Not Order)    Collection Time: 06/20/18  5:16 AM   Result Value Ref Range    Segmented % 93 (H) 43 - 75 %    Bands % 1 0 - 8 %    Lymphocytes % 0 (L) 14 - 44 %    Monocytes % 6 4 - 12 %    Eosinophils % 0 0 - 6 %    Basophils % 0 0 - 1 %    Absolute Neutrophils 22 08 (H) 1 85 - 7 62 Thousand/uL    Lymphocytes Absolute 0 00 (L) 0 60 - 4 47 Thousand/uL    Monocytes Absolute 1 41 (H) 0 00 - 1 22 Thousand/uL    Eosinophils Absolute 0 00 0 00 - 0 40 Thousand/uL    Basophils Absolute 0 00 0 00 - 0 10 Thousand/uL    Total Counted      RBC Morphology Present     Polychromasia Present     Platelet Estimate Adequate Adequate       Radiology Results: I have personally reviewed pertinent reports  Other Diagnostic Testing:   I have personally reviewed pertinent reports          Active Meds:   Current Facility-Administered Medications   Medication Dose Route Frequency    acetaminophen (TYLENOL) tablet 325 mg  325 mg Oral Q8H [facility]    acetaminophen (TYLENOL) tablet 650 mg  650 mg Oral Q6H PRN    albuterol (PROVENTIL HFA,VENTOLIN HFA) inhaler 2 puff  2 puff Inhalation Q4H PRN    albuterol inhalation solution 2 5 mg  2 5 mg Nebulization Q4H PRN    amiodarone tablet 200 mg  200 mg Oral Daily    aspirin chewable tablet 81 mg  81 mg Oral Daily    atorvastatin (LIPITOR) tablet 40 mg  40 mg Oral Daily With Dinner    budesonide-formoterol (SYMBICORT) 160-4 5 mcg/act inhaler 2 puff  2 puff Inhalation BID    cefuroxime (CEFTIN) tablet 500 mg  500 mg Oral H02E [facility]    folic acid (FOLVITE) tablet 1 mg  1 mg Oral Daily    heparin (porcine) subcutaneous injection 5,000 Units  5,000 Units Subcutaneous Q8H [facility]    hydrocortisone sodium succinate (PF) (Solu-CORTEF) injection 25 mg  25 mg Intravenous Daily    loperamide (IMODIUM) capsule 4 mg  4 mg Oral BID after meals    LORazepam (ATIVAN) tablet 1 mg  1 mg Oral Q8H PRN    magnesium oxide (MAG-OX) tablet 400 mg  400 mg Oral BID    nicotine (NICODERM CQ) 14 mg/24hr TD 24 hr patch 1 patch  1 patch Transdermal Daily    ondansetron (ZOFRAN) injection 4 mg  4 mg Intravenous Q6H PRN    oxyCODONE (ROXICODONE) IR tablet 5 mg  5 mg Oral Q6H PRN    pneumococcal 23-valent polysaccharide vaccine (PNEUMOVAX-23) injection 0 5 mL  0 5 mL Subcutaneous Prior to discharge    psyllium (METAMUCIL) 1 packet  1 packet Oral 4x Daily    thiamine (VITAMIN B1) tablet 100 mg  100 mg Oral Daily    tiotropium (SPIRIVA) capsule for inhaler 18 mcg  18 mcg Inhalation Daily    traMADol (ULTRAM) tablet 50 mg  50 mg Oral Q12H    vitamin A capsule 30,000 Units  30,000 Units Oral Daily         VTE Pharmacologic Prophylaxis: Heparin  VTE Mechanical Prophylaxis: sequential compression device    Slade Garibay MD

## 2018-06-20 NOTE — PLAN OF CARE
Problem: Nutrition/Hydration-ADULT  Goal: Nutrient/Hydration intake appropriate for improving, restoring or maintaining nutritional needs  Monitor and assess patient's nutrition/hydration status for malnutrition (ex- brittle hair, bruises, dry skin, pale skin and conjunctiva, muscle wasting, smooth red tongue, and disorientation)  Collaborate with interdisciplinary team and initiate plan and interventions as ordered  Monitor patient's weight and dietary intake as ordered or per policy  Utilize nutrition screening tool and intervene per policy  Determine patient's food preferences and provide high-protein, high-caloric foods as appropriate       INTERVENTIONS:  - Monitor oral intake, urinary output, labs, and treatment plans  - Assess nutrition and hydration status and recommend course of action  - Evaluate amount of meals eaten  - Assist patient with eating if necessary   - Allow adequate time for meals  - Recommend/ encourage appropriate diets, oral nutritional supplements, and vitamin/mineral supplements  - Order, calculate, and assess calorie counts as needed  - Recommend, monitor, and adjust tube feedings and TPN/PPN based on assessed needs  - Assess need for intravenous fluids  - Provide specific nutrition/hydration education as appropriate  - Include patient/family/caregiver in decisions related to nutrition    Outcome: Progressing      Problem: DISCHARGE PLANNING - CARE MANAGEMENT  Goal: Discharge to post-acute care or home with appropriate resources  INTERVENTIONS:  - Conduct assessment to determine patient/family and health care team treatment goals, and need for post-acute services based on payer coverage, community resources, and patient preferences, and barriers to discharge  - Address psychosocial, clinical, and financial barriers to discharge as identified in assessment in conjunction with the patient/family and health care team  - Arrange appropriate level of post-acute services according to patient's needs and preference and payer coverage in collaboration with the physician and health care team  - Communicate with and update the patient/family, physician, and health care team regarding progress on the discharge plan  - Arrange appropriate transportation to post-acute venues  - Complete referral to [facility]  - Provide education and referral to HOST     Outcome: Progressing      Problem: GASTROINTESTINAL - ADULT  Goal: Maintains or returns to baseline bowel function  INTERVENTIONS:  - Assess bowel function  - Encourage oral fluids to ensure adequate hydration  - Administer IV fluids as ordered to ensure adequate hydration  - Administer ordered medications as needed  - Encourage mobilization and activity  - Nutrition services referral to assist patient with appropriate food choices   Outcome: Progressing    Goal: Establish and maintain optimal ostomy function  INTERVENTIONS:  - Assess bowel function  - Encourage oral fluids to ensure adequate hydration  - Administer IV fluids as ordered to ensure adequate hydration  - Administer ordered medications as needed  - Encourage mobilization and activity  - Nutrition services referral to assist patient with appropriate food choices  - Assess stoma site   Outcome: Progressing

## 2018-06-20 NOTE — NURSING NOTE
Per Dr Luis Polo based on CT ABD findings, hold meds and await new orders based on consult with Cholerectal surg

## 2018-06-20 NOTE — PROGRESS NOTES
Notified of patient with free air on CT scan     CT scan results:        Focus of free air adjacent to the mid abdominal bowel anastomosis, #2/32, in keeping with anastomotic leak  Diffuse mesenteric edema likely reactive to the above finding  Circumferental bladder wall thickening with surrounding fat stranding; please assess for cystitis  Small left basilar effusion with persistent left basilar airspace disease on the basis of atelectasis or pneumonia  Partially imaged cardiomegaly; partially imaged pericardial effusion measuring least 10 mm  Patient currently stable     Vitals:    06/20/18 1513   BP: 110/69   Pulse: 91   Resp: 18   Temp: 98 °F (36 7 °C)   SpO2: 92%     No peritonitis     PLAN: continue zosyn   Continue serial exams   Okay for diet  Will likely need repeat CT scan in future for abscess formation assessment

## 2018-06-20 NOTE — RESTORATIVE TECHNICIAN NOTE
Restorative Specialist Mobility Note       Activity: Chair, Dangle, Stand at bedside, Turn, Ambulate in room, Ambulate in gleason     Assistive Device: Front wheel walker, Other (Comment) (chair follow)     Ambulation Response:  Tolerated fairly well  Repositioned: Sitting, Up in chair

## 2018-06-21 LAB
ERYTHROCYTE [DISTWIDTH] IN BLOOD BY AUTOMATED COUNT: 16.3 % (ref 11.6–15.1)
HCT VFR BLD AUTO: 32.1 % (ref 36.5–49.3)
HGB BLD-MCNC: 9.7 G/DL (ref 12–17)
MCH RBC QN AUTO: 29.9 PG (ref 26.8–34.3)
MCHC RBC AUTO-ENTMCNC: 30.2 G/DL (ref 31.4–37.4)
MCV RBC AUTO: 99 FL (ref 82–98)
PLATELET # BLD AUTO: 331 THOUSANDS/UL (ref 149–390)
PMV BLD AUTO: 9.7 FL (ref 8.9–12.7)
PROCALCITONIN SERPL-MCNC: 0.37 NG/ML
RBC # BLD AUTO: 3.24 MILLION/UL (ref 3.88–5.62)
WBC # BLD AUTO: 18.22 THOUSAND/UL (ref 4.31–10.16)

## 2018-06-21 PROCEDURE — 94760 N-INVAS EAR/PLS OXIMETRY 1: CPT | Performed by: SOCIAL WORKER

## 2018-06-21 PROCEDURE — 84145 PROCALCITONIN (PCT): CPT | Performed by: STUDENT IN AN ORGANIZED HEALTH CARE EDUCATION/TRAINING PROGRAM

## 2018-06-21 PROCEDURE — 85027 COMPLETE CBC AUTOMATED: CPT | Performed by: STUDENT IN AN ORGANIZED HEALTH CARE EDUCATION/TRAINING PROGRAM

## 2018-06-21 PROCEDURE — 97535 SELF CARE MNGMENT TRAINING: CPT

## 2018-06-21 PROCEDURE — 94640 AIRWAY INHALATION TREATMENT: CPT | Performed by: SOCIAL WORKER

## 2018-06-21 PROCEDURE — 97530 THERAPEUTIC ACTIVITIES: CPT

## 2018-06-21 RX ADMIN — BUDESONIDE AND FORMOTEROL FUMARATE DIHYDRATE 2 PUFF: 160; 4.5 AEROSOL RESPIRATORY (INHALATION) at 09:20

## 2018-06-21 RX ADMIN — HEPARIN SODIUM 5000 UNITS: 5000 INJECTION, SOLUTION INTRAVENOUS; SUBCUTANEOUS at 05:31

## 2018-06-21 RX ADMIN — ACETAMINOPHEN 325 MG: 325 TABLET ORAL at 05:31

## 2018-06-21 RX ADMIN — ATORVASTATIN CALCIUM 40 MG: 40 TABLET, FILM COATED ORAL at 18:29

## 2018-06-21 RX ADMIN — TIOTROPIUM BROMIDE 18 MCG: 18 CAPSULE ORAL; RESPIRATORY (INHALATION) at 09:20

## 2018-06-21 RX ADMIN — HYDROCORTISONE SODIUM SUCCINATE 25 MG: 100 INJECTION, POWDER, FOR SOLUTION INTRAMUSCULAR; INTRAVENOUS at 09:20

## 2018-06-21 RX ADMIN — ASPIRIN 81 MG 81 MG: 81 TABLET ORAL at 09:19

## 2018-06-21 RX ADMIN — PSYLLIUM HUSK 1 PACKET: 3.4 POWDER ORAL at 09:20

## 2018-06-21 RX ADMIN — AMIODARONE HYDROCHLORIDE 200 MG: 200 TABLET ORAL at 09:20

## 2018-06-21 RX ADMIN — HEPARIN SODIUM 5000 UNITS: 5000 INJECTION, SOLUTION INTRAVENOUS; SUBCUTANEOUS at 13:59

## 2018-06-21 RX ADMIN — Medication 100 MG: at 09:20

## 2018-06-21 RX ADMIN — Medication 400 MG: at 09:19

## 2018-06-21 RX ADMIN — SODIUM CHLORIDE, SODIUM LACTATE, POTASSIUM CHLORIDE, AND CALCIUM CHLORIDE 1000 ML: .6; .31; .03; .02 INJECTION, SOLUTION INTRAVENOUS at 23:35

## 2018-06-21 RX ADMIN — BUDESONIDE AND FORMOTEROL FUMARATE DIHYDRATE 2 PUFF: 160; 4.5 AEROSOL RESPIRATORY (INHALATION) at 18:37

## 2018-06-21 RX ADMIN — Medication 30000 UNITS: at 09:19

## 2018-06-21 RX ADMIN — ACETAMINOPHEN 325 MG: 325 TABLET ORAL at 22:08

## 2018-06-21 RX ADMIN — ALBUTEROL SULFATE 2.5 MG: 2.5 SOLUTION RESPIRATORY (INHALATION) at 08:00

## 2018-06-21 RX ADMIN — TRAMADOL HYDROCHLORIDE 50 MG: 50 TABLET, FILM COATED ORAL at 18:29

## 2018-06-21 RX ADMIN — HEPARIN SODIUM 5000 UNITS: 5000 INJECTION, SOLUTION INTRAVENOUS; SUBCUTANEOUS at 22:08

## 2018-06-21 RX ADMIN — Medication 3.38 G: at 05:31

## 2018-06-21 RX ADMIN — NICOTINE 1 PATCH: 14 PATCH, EXTENDED RELEASE TRANSDERMAL at 09:22

## 2018-06-21 RX ADMIN — Medication 3.38 G: at 18:33

## 2018-06-21 RX ADMIN — Medication 3.38 G: at 00:45

## 2018-06-21 RX ADMIN — ACETAMINOPHEN 325 MG: 325 TABLET ORAL at 13:59

## 2018-06-21 RX ADMIN — PSYLLIUM HUSK 1 PACKET: 3.4 POWDER ORAL at 12:53

## 2018-06-21 RX ADMIN — FOLIC ACID 1 MG: 1 TABLET ORAL at 09:20

## 2018-06-21 RX ADMIN — TRAMADOL HYDROCHLORIDE 50 MG: 50 TABLET, FILM COATED ORAL at 05:31

## 2018-06-21 RX ADMIN — LOPERAMIDE HYDROCHLORIDE 4 MG: 2 CAPSULE ORAL at 09:20

## 2018-06-21 RX ADMIN — Medication 400 MG: at 18:29

## 2018-06-21 RX ADMIN — PSYLLIUM HUSK 1 PACKET: 3.4 POWDER ORAL at 18:30

## 2018-06-21 RX ADMIN — Medication 3.38 G: at 12:53

## 2018-06-21 NOTE — OCCUPATIONAL THERAPY NOTE
Occupational Therapy Treatment Note:       06/21/18 3989   Pain Assessment   Pain Assessment 0-10   Pain Score No Pain   ADL   Where Assessed Chair   Grooming Assistance 5  Supervision/Setup   Grooming Deficit Setup;Supervision/safety; Wash/dry hands; Wash/dry face; Teeth care   UB Bathing Assistance 4  Minimal Assistance   UB Bathing Deficit Setup; Impulsive;Verbal cueing;Supervision/safety; Increased time to complete   LB Bathing Assistance 4  Minimal Assistance   LB Bathing Deficit Setup; Impulsive;Verbal cueing;Supervision/safety; Increased time to complete; Other (Comment)  (assist to wash/dry feet)   UB Dressing Assistance 4  Minimal Assistance   UB Dressing Deficit Setup;Verbal cueing;Supervision/safety; Increased time to complete   LB Dressing Assistance 4  Minimal Assistance   LB Dressing Deficit Steadying;Verbal cueing;Supervision/safety; Increased time to complete; Don/doff R sock; Don/doff L sock   Toileting Assistance  5  Supervision/Setup   Toileting Deficit Use of bedpan/urinal setup   Transfers   Sit to Stand 4  Minimal assistance   Additional items Assist x 1; Impulsive;Verbal cues   Stand to Sit 4  Minimal assistance   Additional items Assist x 1; Impulsive;Verbal cues   Stand pivot 4  Minimal assistance   Additional items Assist x 1; Armrests; Impulsive;Verbal cues   Cognition   Overall Cognitive Status Impaired   Arousal/Participation Alert   Attention Attends with cues to redirect   Orientation Level Oriented X4   Memory Decreased recall of precautions   Following Commands Follows one step commands without difficulty   Activity Tolerance   Activity Tolerance Patient tolerated treatment well   Medical Staff Made Aware ok to see per CANDICE Ambrosio   Assessment   Assessment Patient participated in skilled OT with focus on ADL skill training, functional transfer skills, bed mobiity skills, bathing, dressing, activity tolerance  Patient presents supine in bed receptive to participate in therapy   Patient agreeable to transfer to recliner for purpose of bathing/dressing  Patient requires assist levels as documented secondary to limitations with reach to feet, decreased functional balance, decreased endurance, and vc's to problem solve with certain components of task  Patient is min to mod impulsive with transfers and ADL task performance responding well to feedback  Patient would benefit from short term rehab with focus on increasing functional strength and independence skills for carryover into his dialy routine  Plan   Treatment Interventions ADL retraining;Functional transfer training; Endurance training   Goal Expiration Date 06/26/18   Treatment Day 2   OT Frequency 3-5x/wk   Recommendation   OT Discharge Recommendation Short Term Rehab   OT - OK to Discharge Yes  (when medically cleared)   Barthel Index   Feeding 10   Bathing 0   Grooming Score 5   Dressing Score 5   Bladder Score 10   Bowels Score 10   Toilet Use Score 5   Transfers (Bed/Chair) Score 10   Mobility (Level Surface) Score 10   Stairs Score 0   Barthel Index Score 65   Modified [place] Scale   Modified Omar Scale 4   AMEE John

## 2018-06-21 NOTE — OCCUPATIONAL THERAPY NOTE
Occupational Therapy Treatment Note:       06/21/18 9789   Pain Assessment   Pain Assessment 0-10   Pain Score No Pain   ADL   Where Assessed Chair   Grooming Assistance 5  Supervision/Setup   Grooming Deficit Setup;Supervision/safety; Wash/dry hands; Wash/dry face; Teeth care   UB Bathing Assistance 4  Minimal Assistance   UB Bathing Deficit Setup; Impulsive;Verbal cueing;Supervision/safety; Increased time to complete   LB Bathing Assistance 4  Minimal Assistance   LB Bathing Deficit Setup; Impulsive;Verbal cueing;Supervision/safety; Increased time to complete; Other (Comment)  (assist to wash/dry feet)   UB Dressing Assistance 4  Minimal Assistance   UB Dressing Deficit Setup;Verbal cueing;Supervision/safety; Increased time to complete   LB Dressing Assistance 4  Minimal Assistance   LB Dressing Deficit Steadying;Verbal cueing;Supervision/safety; Increased time to complete; Don/doff R sock; Don/doff L sock   Toileting Assistance  5  Supervision/Setup   Toileting Deficit Use of bedpan/urinal setup   Transfers   Sit to Stand 4  Minimal assistance   Additional items Assist x 1; Impulsive;Verbal cues   Stand to Sit 4  Minimal assistance   Additional items Assist x 1; Impulsive;Verbal cues   Stand pivot 4  Minimal assistance   Additional items Assist x 1; Armrests;Trapeze bar;Impulsive;Verbal cues   Cognition   Overall Cognitive Status Impaired   Arousal/Participation Alert   Attention Attends with cues to redirect   Orientation Level Oriented X4   Memory Decreased recall of precautions   Following Commands Follows one step commands without difficulty   Activity Tolerance   Activity Tolerance Patient tolerated treatment well   Medical Staff Made Aware ok to see per CANDICE Ambrosio   Assessment   Assessment Patient participated in skilled OT with focus on ADL skill training, functional transfer skills, bed mobiity skills, bathing, dressing, activity tolerance  Patient presents supine in bed receptive to participate in therapy   Patient agreeable to transfer to recliner for purpose of bathing/dressing  Patient requires assist levels as documented secondary to limitations with reach to feet, decreased functional balance, decreased endurance, and vc's to problem solve with certain components of task  Patient is min to mod impulsive with transfers and ADL task performance responding well to feedback  Patient would benefit from short term rehab with focus on increasing functional strength and independence skills for carryover into his dialy routine  Plan   Treatment Interventions ADL retraining;Functional transfer training; Endurance training   Goal Expiration Date 06/26/18   Treatment Day 2   OT Frequency 3-5x/wk   Recommendation   OT Discharge Recommendation Short Term Rehab   OT - OK to Discharge Yes  (when medically cleared)   Barthel Index   Feeding 10   Bathing 0   Grooming Score 5   Dressing Score 5   Bladder Score 10   Bowels Score 10   Toilet Use Score 5   Transfers (Bed/Chair) Score 10   Mobility (Level Surface) Score 10   Stairs Score 0   Barthel Index Score 65   Modified Omar Scale   Modified [place] Scale 4   AMEE Smith

## 2018-06-21 NOTE — PROGRESS NOTES
Progress Note - General Surgery   Verito Martinez 66 y o  male MRN: [id number]  Unit/Bed#: [facility] 611-01 Encounter: [id number]    Assessment:  66yM presenting with hypotension 2/2 high ileostomy output  Hypotension resolved but has continued high output from ileostomy     - colostomy 1050ml   -focus of free air seen on CTAP indicating small anastomotic leak    Plan:  Diet as tolerated with adequate fluids  D/C immodium  Metamucil as patient tolerates-encourage  Wet to dry dressings to abdominal wall wound BID  Will need CTAP in the future to evaluate for abscess formation  Primary per SOD B    Subjective/Objective   Subjective: SUZAN  No N/V  +BF from ileostomy  Objective:     Blood pressure 109/70, pulse 79, temperature 98 1 °F (36 7 °C), temperature source Oral, resp  rate 20, height 5' 7" (1 702 m), weight 67 8 kg (149 lb 7 6 oz), SpO2 96 %  ,Body mass index is 23 41 kg/m²  Intake/Output Summary (Last 24 hours) at 06/21/18 0555  Last data filed at 06/21/18 0539   Gross per 24 hour   Intake              100 ml   Output             2725 ml   Net            -2625 ml       Invasive Devices     Peripheral Intravenous Line            Peripheral IV 06/18/18 Left Arm 2 days          Drain            Colostomy  RUQ 29 days                Physical Exam:   NAD  AAOX3  Normal respiratory effort  Soft, TTP RLQ, distended  illeosotmy functioning, brown stool and air  Wound pink and healthy in appearance    Lab, Imaging and other studies:I have personally reviewed pertinent lab results    , CBC:   No results found for: WBC, HGB, HCT, MCV, PLT, ADJUSTEDWBC, MCH, MCHC, RDW, MPV, NRBC, CMP:   No results found for: NA, K, CL, CO2, ANIONGAP, BUN, CREATININE, GLUCOSE, CALCIUM, AST, ALT, ALKPHOS, PROT, ALBUMIN, BILITOT, EGFR  VTE Pharmacologic Prophylaxis: Heparin  VTE Mechanical Prophylaxis: sequential compression device

## 2018-06-21 NOTE — PROGRESS NOTES
IM Residency Progress Note   Unit/Bed#: [facility] 611-01 Encounter: [id number]  SOD Team B       Marilyn Lynn 66 y o  male [id number]    Hospital Stay Days: 8      Assessment/Plan:    Active Problems:    Leukocytosis    Alcohol abuse    Tobacco abuse    Heart failure, systolic, due to idiopathic cardiomyopathy ([facility] [address] )    Ileostomy in place ([facility] [address] )    1  Suspected left lower lobe pneumonia  · Patient was on IV cefepime from 6/14/18-6/19/18, switched to p o  Ceftin 06/19/2018 which was discontinued on 06/20/2018 and replaced with IV Zosyn which patient is currently on for enteric gram-negative and anaerobic coverage  · Leukocytosis increased from 17 on 06/19/2018 to be 24 on 06/20/2018  · Procalcitonin on 06/20/2018 increased to 0 75, however improved to 0 37 today  · Patient is currently afebrile with no new symptoms this morning  · Chest x-ray on 06/20/2018 showed no active pulmonary disease  · Leukocytosis improving over the past 24 hrs  · Vital signs stable, tachycardia improving    2  History of atrial fibrillation  · Currently in sinus rhythm,  · Rhythm controlled with amiodarone    3  Chronic respiratory failure  · Likely secondary to COPD  · Patient is on 2 L oxygen by nasal cannula at home, will continue home dose of oxygen  · Goal oxygen saturation 88-92%    4  COPD without exacerbation  · Stable  · Will continue Symbicort and Spiriva and p r n  albuterol HFA    5  Hypotension  · Patient with chronic hypertension, hypotensive on admission likely secondary to high output of ileostomy and possible withdrawal from steroids prior to admission  · Will discontinue Solu-Cortef  · Will continue to hold outpatient antihypertensive medications at this time  · Will continue to monitor blood pressure, stable today    6  Alcohol dependence  · Beyond time period for onset of alcohol withdrawal  · Continue folic acid and thiamine supplementation    7    Crohn's disease  · Status post colectomy with ileostomy for perforated transverse colon  · Colorectal surgery following  · High output improving, continue Metamucil, discontinue Imodium  · Ileostomy output about 1 5 L in the past 24 hr  · CT abdomen and pelvis without contrast on 2018 showed anastomosis leak with free air and diffuse mesenteric edema  · Colorectal surgery team notified who recommend follow-up CT abdomen and pelvis in a few days to assess for abscess formation    8  CAD  · Continue aspirin and statin therapy  · Stable    9  Nicotine dependent  · Stable, continue nicotine patch    10  Leukocytosis  · Likely multifactorial with possible infection (pneumonia versus GI origin) plus steroid therapy  · WBC count improving over the past 24 hr  · Patient was switched to IV Zosyn yesterday, will continue Zosyn at this time    11  Electrolyte abnormalities  · Patient with hypomagnesemia and hypocalcemia which had both improved since admission  · Will continue to monitor electrolyte levels and replete as necessary  · Labs pending    Disposition:  Continue inpatient care  Case management is working on placement into short-term rehab/ARC  Patient will be transferred to colorectal surgery service  Subjective:   Patient examined at bedside  RN states no acute events overnight  Patient states that he feels okay, but better than yesterday  Patient still refers abdominal discomfort and shortness of breath  Patient denies any current chest pain, fever, headache, neck stiffness, palpitations, dysuria  Patient states he is voiding regularly    All other review of systems negative at this time       Vitals: Temp (24hrs), Av 3 °F (36 8 °C), Min:98 °F (36 7 °C), Max:99 °F (37 2 °C)  Current: Temperature: 99 °F (37 2 °C)  Vitals:    18 2246 18 0600 18 0609 18 0800   BP: 109/70  110/72    BP Location: Right arm  Right arm    Pulse: 79  81    Resp: 20  20    Temp: 98 1 °F (36 7 °C)  99 °F (37 2 °C)    TempSrc: Oral  Oral    SpO2: 96%  98% (!) 85% Weight:  68 1 kg (150 lb 2 1 oz)     Height:        Body mass index is 23 51 kg/m²  I/O last 24 hours: In: 100 [P O :100]  Out: 2525 [Urine:975; Stool:1550]      Physical Exam   Constitutional: He is oriented to person, place, and time  He appears well-developed and well-nourished  65-year-old male, appears older than stated age, BMI 21   HENT:   Head: Normocephalic and atraumatic  Mouth/Throat: No oropharyngeal exudate  Eyes: Conjunctivae are normal  No scleral icterus  Neck: No JVD present  No thyromegaly present  Cardiovascular: Normal rate, regular rhythm, normal heart sounds and intact distal pulses  Exam reveals no gallop and no friction rub  No murmur heard  Pulmonary/Chest: Effort normal    Patient with decreased breath sounds, expiratory wheezes throughout, patient saturating at 93% on 2 L nasal cannula   Abdominal: Soft  Bowel sounds are normal  He exhibits no distension  There is tenderness  There is guarding  There is no rebound  Ostomy in place with gas and bowel present   Musculoskeletal: Normal range of motion  He exhibits no edema  Lymphadenopathy:     He has no cervical adenopathy  Neurological: He is alert and oriented to person, place, and time  No cranial nerve deficit  He exhibits normal muscle tone  Skin: Skin is warm and dry  No rash noted  No erythema  Psychiatric: He has a normal mood and affect  His behavior is normal    Nursing note and vitals reviewed          Invasive Devices     Peripheral Intravenous Line            Peripheral IV 06/18/18 Left Arm 3 days          Drain            Colostomy  RUQ 29 days                          Labs:   Recent Results (from the past 24 hour(s))   CBC    Collection Time: 06/21/18  5:34 AM   Result Value Ref Range    WBC 18 22 (H) 4 31 - 10 16 Thousand/uL    RBC 3 24 (L) 3 88 - 5 62 Million/uL    Hemoglobin 9 7 (L) 12 0 - 17 0 g/dL    Hematocrit 32 1 (L) 36 5 - 49 3 %    MCV 99 (H) 82 - 98 fL    MCH 29 9 26 8 - 34 3 pg MCHC 30 2 (L) 31 4 - 37 4 g/dL    RDW 16 3 (H) 11 6 - 15 1 %    Platelets 145 927 - 889 Thousands/uL    MPV 9 7 8 9 - 12 7 fL   Procalcitonin    Collection Time: 06/21/18  5:34 AM   Result Value Ref Range    Procalcitonin 0 37 (H) <=0 25 ng/ml       Radiology Results: I have personally reviewed pertinent reports  Other Diagnostic Testing:   I have personally reviewed pertinent reports          Active Meds:   Current Facility-Administered Medications   Medication Dose Route Frequency    acetaminophen (TYLENOL) tablet 325 mg  325 mg Oral Q8H [facility]    acetaminophen (TYLENOL) tablet 650 mg  650 mg Oral Q6H PRN    albuterol (PROVENTIL HFA,VENTOLIN HFA) inhaler 2 puff  2 puff Inhalation Q4H PRN    albuterol inhalation solution 2 5 mg  2 5 mg Nebulization Q4H PRN    amiodarone tablet 200 mg  200 mg Oral Daily    aspirin chewable tablet 81 mg  81 mg Oral Daily    atorvastatin (LIPITOR) tablet 40 mg  40 mg Oral Daily With Dinner    budesonide-formoterol (SYMBICORT) 160-4 5 mcg/act inhaler 2 puff  2 puff Inhalation BID    folic acid (FOLVITE) tablet 1 mg  1 mg Oral Daily    heparin (porcine) subcutaneous injection 5,000 Units  5,000 Units Subcutaneous Q8H [facility]    hydrocortisone sodium succinate (PF) (Solu-CORTEF) injection 25 mg  25 mg Intravenous Daily    loperamide (IMODIUM) capsule 4 mg  4 mg Oral Daily    LORazepam (ATIVAN) tablet 1 mg  1 mg Oral Q8H PRN    magnesium oxide (MAG-OX) tablet 400 mg  400 mg Oral BID    nicotine (NICODERM CQ) 14 mg/24hr TD 24 hr patch 1 patch  1 patch Transdermal Daily    ondansetron (ZOFRAN) injection 4 mg  4 mg Intravenous Q6H PRN    oxyCODONE (ROXICODONE) IR tablet 5 mg  5 mg Oral Q6H PRN    piperacillin-tazobactam (ZOSYN) 3 375 g in sodium chloride 0 9 % 50 mL IVPB  3 375 g Intravenous Q6H    pneumococcal 23-valent polysaccharide vaccine (PNEUMOVAX-23) injection 0 5 mL  0 5 mL Subcutaneous Prior to discharge    psyllium (METAMUCIL) 1 packet  1 packet Oral 4x Daily  thiamine (VITAMIN B1) tablet 100 mg  100 mg Oral Daily    tiotropium (SPIRIVA) capsule for inhaler 18 mcg  18 mcg Inhalation Daily    traMADol (ULTRAM) tablet 50 mg  50 mg Oral Q12H    vitamin A capsule 30,000 Units  30,000 Units Oral Daily         VTE Pharmacologic Prophylaxis: Heparin  VTE Mechanical Prophylaxis: sequential compression device    Mag Dumont MD

## 2018-06-21 NOTE — PLAN OF CARE
Problem: OCCUPATIONAL THERAPY ADULT  Goal: Performs self-care activities at highest level of function for planned discharge setting  See evaluation for individualized goals  Treatment Interventions: ADL retraining, Functional transfer training, UE strengthening/ROM, Endurance training, Cognitive reorientation, Patient/family training, Equipment evaluation/education, Compensatory technique education, Continued evaluation, Energy conservation, Activityengagement          See flowsheet documentation for full assessment, interventions and recommendations  Outcome: Progressing  Limitation: Decreased ADL status, Decreased UE strength, Decreased Safe judgement during ADL, Decreased endurance, Decreased self-care trans, Decreased high-level ADLs  Prognosis: Fair  Assessment: Patient participated in skilled OT with focus on ADL skill training, functional transfer skills, bed mobiity skills, bathing, dressing, activity tolerance  Patient presents supine in bed receptive to participate in therapy  Patient agreeable to transfer to recliner for purpose of bathing/dressing  Patient requires assist levels as documented secondary to limitations with reach to feet, decreased functional balance, decreased endurance, and vc's to problem solve with certain components of task  Patient is min to mod impulsive with transfers and ADL task performance responding well to feedback  Patient would benefit from short term rehab with focus on increasing functional strength and independence skills for carryover into his dialy routine        OT Discharge Recommendation: Short Term Rehab  OT - OK to Discharge: Yes (when medically cleared)   Gail Tanner

## 2018-06-21 NOTE — OCCUPATIONAL THERAPY NOTE
Occupational Therapy Treatment Note:       06/21/18 4743   Pain Assessment   Pain Assessment 0-10   Pain Score No Pain   ADL   Where Assessed Chair   Grooming Assistance 5  Supervision/Setup   Grooming Deficit Setup;Supervision/safety; Wash/dry hands; Wash/dry face; Teeth care   UB Bathing Assistance 4  Minimal Assistance   UB Bathing Deficit Setup; Impulsive;Verbal cueing;Supervision/safety; Increased time to complete   LB Bathing Assistance 4  Minimal Assistance   LB Bathing Deficit Setup; Impulsive;Verbal cueing;Supervision/safety; Increased time to complete; Other (Comment)  (assist to wash/dry feet)   UB Dressing Assistance 4  Minimal Assistance   UB Dressing Deficit Setup;Verbal cueing;Supervision/safety; Increased time to complete   LB Dressing Assistance 4  Minimal Assistance   LB Dressing Deficit Steadying;Verbal cueing;Supervision/safety; Increased time to complete; Don/doff R sock; Don/doff L sock   Toileting Assistance  5  Supervision/Setup   Toileting Deficit Use of bedpan/urinal setup   Transfers   Sit to Stand 4  Minimal assistance   Additional items Assist x 1; Impulsive;Verbal cues   Stand to Sit 4  Minimal assistance   Additional items Assist x 1; Impulsive;Verbal cues   Stand pivot 4  Minimal assistance   Additional items Assist x 1; Armrests;Trapeze bar;Impulsive;Verbal cues   Cognition   Overall Cognitive Status Impaired   Arousal/Participation Alert   Attention Attends with cues to redirect   Orientation Level Oriented X4   Memory Decreased recall of precautions   Following Commands Follows one step commands without difficulty   Activity Tolerance   Activity Tolerance Patient tolerated treatment well   Medical Staff Made Aware ok to see per CANDICE Ambrosio   Assessment   Assessment Patient participated in skilled OT with focus on ADL skill training, functional transfer skills, bed mobiity skills, bathing, dressing, activity tolerance  Patient presents supine in bed receptive to participate in therapy   Patient agreeable to transfer to recliner for purpose of bathing/dressing  Patient requires assist levels as documented secondary to limitations with reach to feet, decreased functional balance, decreased endurance, and vc's to problem solve with certain components of task  Patient is min to mod impulsive with transfers and ADL task performance responding well to feedback  Patient would benefit from short term rehab with focus on increasing functional strength and independence skills for carryover into his dialy routine  Plan   Treatment Interventions ADL retraining;Functional transfer training; Endurance training   Goal Expiration Date 06/26/18   Treatment Day 2   OT Frequency 3-5x/wk   Recommendation   OT Discharge Recommendation Short Term Rehab   OT - OK to Discharge Yes  (when medically cleared)   Barthel Index   Feeding 10   Bathing 0   Grooming Score 5   Dressing Score 5   Bladder Score 10   Bowels Score 10   Toilet Use Score 5   Transfers (Bed/Chair) Score 10   Mobility (Level Surface) Score 10   Stairs Score 0   Barthel Index Score 65   Modified [place] Scale   Modified [place] Scale 4        06/21/18 1615   Pain Assessment   Pain Assessment 0-10   Pain Score No Pain   ADL   Where Assessed Chair   Grooming Assistance 5  Supervision/Setup   Grooming Deficit Setup;Supervision/safety; Wash/dry hands; Wash/dry face; Teeth care   UB Bathing Assistance 4  Minimal Assistance   UB Bathing Deficit Setup; Impulsive;Verbal cueing;Supervision/safety; Increased time to complete   LB Bathing Assistance 4  Minimal Assistance   LB Bathing Deficit Setup; Impulsive;Verbal cueing;Supervision/safety; Increased time to complete; Other (Comment)  (assist to wash/dry feet)   UB Dressing Assistance 4  Minimal Assistance   UB Dressing Deficit Setup;Verbal cueing;Supervision/safety; Increased time to complete   LB Dressing Assistance 4  Minimal Assistance   LB Dressing Deficit Steadying;Verbal cueing;Supervision/safety; Increased time to complete; Don/doff R sock; Don/doff L sock   Toileting Assistance  5  Supervision/Setup   Toileting Deficit Use of bedpan/urinal setup   Transfers   Sit to Stand 4  Minimal assistance   Additional items Assist x 1; Impulsive;Verbal cues   Stand to Sit 4  Minimal assistance   Additional items Assist x 1; Impulsive;Verbal cues   Stand pivot 4  Minimal assistance   Additional items Assist x 1; Armrests;Trapeze bar;Impulsive;Verbal cues   Cognition   Overall Cognitive Status Impaired   Arousal/Participation Alert   Attention Attends with cues to redirect   Orientation Level Oriented X4   Memory Decreased recall of precautions   Following Commands Follows one step commands without difficulty   Activity Tolerance   Activity Tolerance Patient tolerated treatment well   Medical Staff Made Aware ok to see per CANDICE Ambrosio   Assessment   Assessment Patient participated in skilled OT with focus on ADL skill training, functional transfer skills, bed mobiity skills, bathing, dressing, activity tolerance  Patient presents supine in bed receptive to participate in therapy  Patient agreeable to transfer to recliner for purpose of bathing/dressing  Patient requires assist levels as documented secondary to limitations with reach to feet, decreased functional balance, decreased endurance, and vc's to problem solve with certain components of task  Patient is min to mod impulsive with transfers and ADL task performance responding well to feedback  Patient would benefit from short term rehab with focus on increasing functional strength and independence skills for carryover into his dialy routine  Plan   Treatment Interventions ADL retraining;Functional transfer training; Endurance training   Goal Expiration Date 06/26/18   Treatment Day 2   OT Frequency 3-5x/wk   Recommendation   OT Discharge Recommendation Short Term Rehab   OT - OK to Discharge Yes  (when medically cleared)   Barthel Index   Feeding 10   Bathing 0   Grooming Score 5 Dressing Score 5   Bladder Score 10   Bowels Score 10   Toilet Use Score 5   Transfers (Bed/Chair) Score 10   Mobility (Level Surface) Score 10   Stairs Score 0   Barthel Index Score 65   Modified [place] Scale   Modified [place] Scale 4   AMEE Hernández

## 2018-06-21 NOTE — RESPIRATORY THERAPY NOTE
resp care      06/21/18 0800   Respiratory Assessment   Resp Comments pt called for tx  stated he doesnt want scheduled tx and will call when needed     Additional Assessments   SpO2 (!) 85 %

## 2018-06-22 LAB
ANION GAP SERPL CALCULATED.3IONS-SCNC: 10 MMOL/L (ref 4–13)
BASOPHILS # BLD AUTO: 0.02 THOUSANDS/ΜL (ref 0–0.1)
BASOPHILS NFR BLD AUTO: 0 % (ref 0–1)
BUN SERPL-MCNC: 9 MG/DL (ref 5–25)
CALCIUM SERPL-MCNC: 7.8 MG/DL (ref 8.3–10.1)
CHLORIDE SERPL-SCNC: 102 MMOL/L (ref 100–108)
CO2 SERPL-SCNC: 29 MMOL/L (ref 21–32)
CREAT SERPL-MCNC: 0.8 MG/DL (ref 0.6–1.3)
EOSINOPHIL # BLD AUTO: 0.04 THOUSAND/ΜL (ref 0–0.61)
EOSINOPHIL NFR BLD AUTO: 0 % (ref 0–6)
ERYTHROCYTE [DISTWIDTH] IN BLOOD BY AUTOMATED COUNT: 16.1 % (ref 11.6–15.1)
GFR SERPL CREATININE-BSD FRML MDRD: 100 ML/MIN/1.73SQ M
GLUCOSE SERPL-MCNC: 78 MG/DL (ref 65–140)
HCT VFR BLD AUTO: 29.1 % (ref 36.5–49.3)
HGB BLD-MCNC: 8.8 G/DL (ref 12–17)
IMM GRANULOCYTES # BLD AUTO: 0.43 THOUSAND/UL (ref 0–0.2)
IMM GRANULOCYTES NFR BLD AUTO: 2 % (ref 0–2)
LYMPHOCYTES # BLD AUTO: 2.06 THOUSANDS/ΜL (ref 0.6–4.47)
LYMPHOCYTES NFR BLD AUTO: 11 % (ref 14–44)
MAGNESIUM SERPL-MCNC: 1.9 MG/DL (ref 1.6–2.6)
MCH RBC QN AUTO: 29.9 PG (ref 26.8–34.3)
MCHC RBC AUTO-ENTMCNC: 30.2 G/DL (ref 31.4–37.4)
MCV RBC AUTO: 99 FL (ref 82–98)
MONOCYTES # BLD AUTO: 1.21 THOUSAND/ΜL (ref 0.17–1.22)
MONOCYTES NFR BLD AUTO: 7 % (ref 4–12)
NEUTROPHILS # BLD AUTO: 14.8 THOUSANDS/ΜL (ref 1.85–7.62)
NEUTS SEG NFR BLD AUTO: 80 % (ref 43–75)
NRBC BLD AUTO-RTO: 0 /100 WBCS
PLATELET # BLD AUTO: 344 THOUSANDS/UL (ref 149–390)
PMV BLD AUTO: 9.6 FL (ref 8.9–12.7)
POTASSIUM SERPL-SCNC: 4.2 MMOL/L (ref 3.5–5.3)
RBC # BLD AUTO: 2.94 MILLION/UL (ref 3.88–5.62)
SODIUM SERPL-SCNC: 141 MMOL/L (ref 136–145)
WBC # BLD AUTO: 18.56 THOUSAND/UL (ref 4.31–10.16)

## 2018-06-22 PROCEDURE — 97530 THERAPEUTIC ACTIVITIES: CPT

## 2018-06-22 PROCEDURE — 83735 ASSAY OF MAGNESIUM: CPT | Performed by: PHYSICIAN ASSISTANT

## 2018-06-22 PROCEDURE — 94640 AIRWAY INHALATION TREATMENT: CPT

## 2018-06-22 PROCEDURE — 94760 N-INVAS EAR/PLS OXIMETRY 1: CPT

## 2018-06-22 PROCEDURE — 80048 BASIC METABOLIC PNL TOTAL CA: CPT | Performed by: PHYSICIAN ASSISTANT

## 2018-06-22 PROCEDURE — 85025 COMPLETE CBC W/AUTO DIFF WBC: CPT | Performed by: PHYSICIAN ASSISTANT

## 2018-06-22 PROCEDURE — 97535 SELF CARE MNGMENT TRAINING: CPT

## 2018-06-22 RX ADMIN — ALBUTEROL SULFATE 2 PUFF: 90 AEROSOL, METERED RESPIRATORY (INHALATION) at 08:45

## 2018-06-22 RX ADMIN — PSYLLIUM HUSK 1 PACKET: 3.4 POWDER ORAL at 12:52

## 2018-06-22 RX ADMIN — BUDESONIDE AND FORMOTEROL FUMARATE DIHYDRATE 2 PUFF: 160; 4.5 AEROSOL RESPIRATORY (INHALATION) at 18:10

## 2018-06-22 RX ADMIN — Medication 3.38 G: at 07:00

## 2018-06-22 RX ADMIN — TRAMADOL HYDROCHLORIDE 50 MG: 50 TABLET, FILM COATED ORAL at 05:42

## 2018-06-22 RX ADMIN — ACETAMINOPHEN 325 MG: 325 TABLET ORAL at 14:09

## 2018-06-22 RX ADMIN — FOLIC ACID 1 MG: 1 TABLET ORAL at 09:38

## 2018-06-22 RX ADMIN — HEPARIN SODIUM 5000 UNITS: 5000 INJECTION, SOLUTION INTRAVENOUS; SUBCUTANEOUS at 05:41

## 2018-06-22 RX ADMIN — Medication 3.38 G: at 18:11

## 2018-06-22 RX ADMIN — ACETAMINOPHEN 325 MG: 325 TABLET ORAL at 05:42

## 2018-06-22 RX ADMIN — BUDESONIDE AND FORMOTEROL FUMARATE DIHYDRATE 2 PUFF: 160; 4.5 AEROSOL RESPIRATORY (INHALATION) at 09:38

## 2018-06-22 RX ADMIN — Medication 3.38 G: at 01:37

## 2018-06-22 RX ADMIN — HEPARIN SODIUM 5000 UNITS: 5000 INJECTION, SOLUTION INTRAVENOUS; SUBCUTANEOUS at 22:21

## 2018-06-22 RX ADMIN — OXYCODONE HYDROCHLORIDE 5 MG: 5 TABLET ORAL at 22:29

## 2018-06-22 RX ADMIN — ALBUTEROL SULFATE 2.5 MG: 2.5 SOLUTION RESPIRATORY (INHALATION) at 20:28

## 2018-06-22 RX ADMIN — Medication 100 MG: at 09:37

## 2018-06-22 RX ADMIN — ATORVASTATIN CALCIUM 40 MG: 40 TABLET, FILM COATED ORAL at 18:09

## 2018-06-22 RX ADMIN — TRAMADOL HYDROCHLORIDE 50 MG: 50 TABLET, FILM COATED ORAL at 18:10

## 2018-06-22 RX ADMIN — PSYLLIUM HUSK 1 PACKET: 3.4 POWDER ORAL at 09:38

## 2018-06-22 RX ADMIN — Medication 30000 UNITS: at 09:37

## 2018-06-22 RX ADMIN — Medication 3.38 G: at 12:52

## 2018-06-22 RX ADMIN — TIOTROPIUM BROMIDE 18 MCG: 18 CAPSULE ORAL; RESPIRATORY (INHALATION) at 09:38

## 2018-06-22 RX ADMIN — Medication 400 MG: at 18:10

## 2018-06-22 RX ADMIN — AMIODARONE HYDROCHLORIDE 200 MG: 200 TABLET ORAL at 09:38

## 2018-06-22 RX ADMIN — Medication 400 MG: at 09:38

## 2018-06-22 RX ADMIN — ASPIRIN 81 MG 81 MG: 81 TABLET ORAL at 09:38

## 2018-06-22 RX ADMIN — NICOTINE 1 PATCH: 14 PATCH, EXTENDED RELEASE TRANSDERMAL at 09:38

## 2018-06-22 RX ADMIN — ACETAMINOPHEN 325 MG: 325 TABLET ORAL at 22:27

## 2018-06-22 RX ADMIN — HEPARIN SODIUM 5000 UNITS: 5000 INJECTION, SOLUTION INTRAVENOUS; SUBCUTANEOUS at 14:09

## 2018-06-22 NOTE — PROGRESS NOTES
Patient received a 1 L bolus of LR overnight  Urine output this am at approximately 0500 was 125 mL in bedside urinal     BED ALARM on patient and therefore, patient was unable to get out of bed to urinate in toilet without staff being alerted

## 2018-06-22 NOTE — OCCUPATIONAL THERAPY NOTE
Occupational Therapy Treatment Note     06/22/18 0853   Restrictions/Precautions   Weight Bearing Precautions Per Order No   Braces or Orthoses (none)   Other Precautions Contact/isolation   Lifestyle   Autonomy Pt reports being I w/ ADLS (w/ setup), and A for IADLS, and Mod I w/ transfers and functional mobility pTA   Reciprocal Relationships Pt lives w/ daughter and her 3 kids; dght works during the day   Service to Others Pt does not work   Intrinsic Gratification Pt reports he does nothing   Pain Assessment   Pain Assessment No/denies pain   ADL   Where Assessed Chair   Grooming Assistance 5  Supervision/Setup   Grooming Deficit Setup; Increased time to complete   UB Bathing Assistance 5  Supervision/Setup   UB Bathing Deficit Setup; Increased time to complete   LB Bathing Assistance 4  Minimal Assistance   LB Bathing Deficit Buttocks   UB Dressing Assistance 4  Minimal Assistance   UB Dressing Deficit Setup; Thread RUE; Increased time to complete   Bed Mobility   Supine to Sit 4  Minimal assistance   Additional items Assist x 1   Sit to Supine 5  Supervision   Transfers   Sit to Stand 4  Minimal assistance   Additional items Assist x 1   Stand to Sit 5  Supervision   Functional Mobility   Functional Mobility 5  Supervision   Additional items Rolling walker   Cognition   Overall Cognitive Status Impaired   Arousal/Participation Alert   Attention Attends with cues to redirect   Orientation Level Oriented X4   Memory Decreased recall of precautions   Following Commands Follows one step commands without difficulty   Activity Tolerance   Activity Tolerance Patient tolerated treatment well   Assessment   Assessment Pt participated in occupational therapy with focus on activity tolerance, functional transfers/mob, standing balance and tolerance for pt engagement in functional self-care task/UB and LB self-care and energy conservation techniques  Pt cleared by CANDICE/Kenisha for pt participation in therapy    Pt received HOB raised/supine pt sitting upright and agreeable to therapy following pt Identifiers confirmed  Pt reported fatigues easily however agreeable to trial AM self-care at bathroom sink  Pt able to tolerate tasks well with chair set up at bathroom sink for seated rest breaks  Pt reported short of breath and requested therapy to inform nurse of his need for "breathing treatment"   Pt will require in-pt rehab to continue to address pt deficits with decreased activity tolerance and overall strength which currently impair pt ADL and functional mob      Plan   Treatment Interventions ADL retraining   Goal Expiration Date 06/26/18   Treatment Day 2   OT Frequency 3-5x/wk   Recommendation   OT Discharge Recommendation Short Term Rehab   Barthel Index   Feeding 10   Bathing 0   Grooming Score 5   Dressing Score 5   Bladder Score 10   Bowels Score 10   Toilet Use Score 5   Transfers (Bed/Chair) Score 10   Mobility (Level Surface) Score 10   Stairs Score 0   Barthel Index Score 65   Modified [place] Scale   Modified [place] Scale 4       [place] LUBIN/L

## 2018-06-22 NOTE — SOCIAL WORK
Pt denied for ARC by insurance  CM spoke with patient regarding STR  Pt stated "he will think about it"  SNF list given  Not medically cleared at this time

## 2018-06-22 NOTE — PROGRESS NOTES
Progress Note - Colorectal Surgery   Fran Hernandez 66 y o  male MRN: [id number]  Unit/Bed#: [facility] 611-01 Encounter: [id number]    Assessment:  66 M with high output ileostomy  - 800 ostomy output    Plan:  Diet as tolerated  Light IV fluids until taking adequate PO consistently  Continue metamucil  Wet to dry dressings daily  CTAP Monday if no significant improvement    Subjective/Objective     Subjective: Low urine output recorded overnight, 1 L bolus given  Patient reports voiding multiple times with clear urine  Otherwise no events  Objective:    Blood pressure 108/70, pulse 81, temperature 97 9 °F (36 6 °C), temperature source Oral, resp  rate 18, height 5' 7" (1 702 m), weight 72 kg (158 lb 11 7 oz), SpO2 92 %  ,Body mass index is 24 86 kg/m²        Intake/Output Summary (Last 24 hours) at 06/22/18 0809  Last data filed at 06/22/18 0320   Gross per 24 hour   Intake              630 ml   Output             1050 ml   Net             -420 ml       Invasive Devices     Peripheral Intravenous Line            Peripheral IV 06/18/18 Left Arm 4 days          Drain            Colostomy  RUQ 30 days                Physical Exam:   General: NAD, AAOx3  CV: RRR +S1/S2  Chest: breath sounds bilaterally  Abdomen: soft, NT ND, wet to dry in palce  Extremities: atraumatic, no edema        Results from last 7 days  Lab Units 06/21/18  0534 06/20/18  0516 06/19/18  0430   WBC Thousand/uL 18 22* 23 49* 16 51*   HEMOGLOBIN g/dL 9 7* 10 0* 9 0*   HEMATOCRIT % 32 1* 32 9* 30 0*   PLATELETS Thousands/uL 331 354 310       Results from last 7 days  Lab Units 06/22/18  0532 06/20/18  0516 06/19/18  0430   SODIUM mmol/L 141 137 138   POTASSIUM mmol/L 4 2 4 3 4 0   CHLORIDE mmol/L 102 103 104   CO2 mmol/L 29 28 21   BUN mg/dL 9 12 11   CREATININE mg/dL 0 80 0 71 0 73   GLUCOSE RANDOM mg/dL 78 63* 81   CALCIUM mg/dL 7 8* 8 2* 7 0*

## 2018-06-22 NOTE — PROGRESS NOTES
Pt urine output low  Hasn't voided since 1300 6/21  Bladder scan pt and showed 44 mL  Pt agitated  States, "doesn't want to be bothered for the rest of the night " Paged white surgery  Spoke to Gilda Gonsalves MD  Placed order for fluid bolus  Aware pt is agitated

## 2018-06-22 NOTE — PROGRESS NOTES
Order states abd dressing to be changed daily  Deisy Bates note says dressing supposed to be BID  Pt states dressing has only been changed once a day  Spoke to Cesar Sanders MD about this   [facility] would pass it along to morning team

## 2018-06-22 NOTE — PLAN OF CARE
Problem: OCCUPATIONAL THERAPY ADULT  Goal: Performs self-care activities at highest level of function for planned discharge setting  See evaluation for individualized goals  Treatment Interventions: ADL retraining, Functional transfer training, UE strengthening/ROM, Endurance training, Cognitive reorientation, Patient/family training, Equipment evaluation/education, Compensatory technique education, Continued evaluation, Energy conservation, Activityengagement          See flowsheet documentation for full assessment, interventions and recommendations  Outcome: Progressing  Limitation: Decreased ADL status, Decreased UE strength, Decreased Safe judgement during ADL, Decreased endurance, Decreased self-care trans, Decreased high-level ADLs  Prognosis: Fair  Assessment: Pt participated in occupational therapy with focus on activity tolerance, functional transfers/mob, standing balance and tolerance for pt engagement in functional self-care task/UB and LB self-care and energy conservation techniques  Pt cleared by CANDICE/Kenisha for pt participation in therapy  Pt received HOB raised/supine pt sitting upright and agreeable to therapy following pt Identifiers confirmed  Pt reported fatigues easily however agreeable to trial AM self-care at bathroom sink  Pt able to tolerate tasks well with chair set up at bathroom sink for seated rest breaks  Pt reported short of breath and requested therapy to inform nurse of his need for "breathing treatment"   Pt will require in-pt rehab to continue to address pt deficits with decreased activity tolerance and overall strength which currently impair pt ADL and functional mob        OT Discharge Recommendation: Short Term Rehab  OT - OK to Discharge: Yes (when medically cleared)

## 2018-06-23 LAB
ANION GAP SERPL CALCULATED.3IONS-SCNC: 6 MMOL/L (ref 4–13)
ANISOCYTOSIS BLD QL SMEAR: PRESENT
BASOPHILS # BLD MANUAL: 0 THOUSAND/UL (ref 0–0.1)
BASOPHILS NFR MAR MANUAL: 0 % (ref 0–1)
BUN SERPL-MCNC: 8 MG/DL (ref 5–25)
CALCIUM SERPL-MCNC: 7.9 MG/DL (ref 8.3–10.1)
CHLORIDE SERPL-SCNC: 102 MMOL/L (ref 100–108)
CO2 SERPL-SCNC: 31 MMOL/L (ref 21–32)
CREAT SERPL-MCNC: 0.74 MG/DL (ref 0.6–1.3)
EOSINOPHIL # BLD MANUAL: 0 THOUSAND/UL (ref 0–0.4)
EOSINOPHIL NFR BLD MANUAL: 0 % (ref 0–6)
ERYTHROCYTE [DISTWIDTH] IN BLOOD BY AUTOMATED COUNT: 16.3 % (ref 11.6–15.1)
GFR SERPL CREATININE-BSD FRML MDRD: 103 ML/MIN/1.73SQ M
GLUCOSE SERPL-MCNC: 73 MG/DL (ref 65–140)
HCT VFR BLD AUTO: 29 % (ref 36.5–49.3)
HGB BLD-MCNC: 8.7 G/DL (ref 12–17)
LYMPHOCYTES # BLD AUTO: 1.6 THOUSAND/UL (ref 0.6–4.47)
LYMPHOCYTES # BLD AUTO: 9 % (ref 14–44)
MCH RBC QN AUTO: 29.6 PG (ref 26.8–34.3)
MCHC RBC AUTO-ENTMCNC: 30 G/DL (ref 31.4–37.4)
MCV RBC AUTO: 99 FL (ref 82–98)
METAMYELOCYTES NFR BLD MANUAL: 2 % (ref 0–1)
MONOCYTES # BLD AUTO: 0.89 THOUSAND/UL (ref 0–1.22)
MONOCYTES NFR BLD: 5 % (ref 4–12)
NEUTROPHILS # BLD MANUAL: 14.93 THOUSAND/UL (ref 1.85–7.62)
NEUTS SEG NFR BLD AUTO: 84 % (ref 43–75)
NRBC BLD AUTO-RTO: 0 /100 WBCS
PLATELET # BLD AUTO: 352 THOUSANDS/UL (ref 149–390)
PLATELET BLD QL SMEAR: ADEQUATE
PMV BLD AUTO: 9.6 FL (ref 8.9–12.7)
POTASSIUM SERPL-SCNC: 4.1 MMOL/L (ref 3.5–5.3)
RBC # BLD AUTO: 2.94 MILLION/UL (ref 3.88–5.62)
RBC MORPH BLD: PRESENT
SODIUM SERPL-SCNC: 139 MMOL/L (ref 136–145)
TOTAL CELLS COUNTED SPEC: 100
WBC # BLD AUTO: 17.77 THOUSAND/UL (ref 4.31–10.16)

## 2018-06-23 PROCEDURE — 94760 N-INVAS EAR/PLS OXIMETRY 1: CPT

## 2018-06-23 PROCEDURE — 94640 AIRWAY INHALATION TREATMENT: CPT

## 2018-06-23 PROCEDURE — 80048 BASIC METABOLIC PNL TOTAL CA: CPT | Performed by: PHYSICIAN ASSISTANT

## 2018-06-23 PROCEDURE — 85027 COMPLETE CBC AUTOMATED: CPT | Performed by: PHYSICIAN ASSISTANT

## 2018-06-23 PROCEDURE — 85007 BL SMEAR W/DIFF WBC COUNT: CPT | Performed by: PHYSICIAN ASSISTANT

## 2018-06-23 RX ADMIN — BUDESONIDE AND FORMOTEROL FUMARATE DIHYDRATE 2 PUFF: 160; 4.5 AEROSOL RESPIRATORY (INHALATION) at 18:02

## 2018-06-23 RX ADMIN — AMIODARONE HYDROCHLORIDE 200 MG: 200 TABLET ORAL at 09:34

## 2018-06-23 RX ADMIN — Medication 3.38 G: at 18:10

## 2018-06-23 RX ADMIN — OXYCODONE HYDROCHLORIDE 5 MG: 5 TABLET ORAL at 19:17

## 2018-06-23 RX ADMIN — Medication 30000 UNITS: at 09:35

## 2018-06-23 RX ADMIN — ACETAMINOPHEN 325 MG: 325 TABLET ORAL at 21:21

## 2018-06-23 RX ADMIN — FOLIC ACID 1 MG: 1 TABLET ORAL at 09:34

## 2018-06-23 RX ADMIN — HEPARIN SODIUM 5000 UNITS: 5000 INJECTION, SOLUTION INTRAVENOUS; SUBCUTANEOUS at 21:21

## 2018-06-23 RX ADMIN — SODIUM CHLORIDE 500 ML: 0.9 INJECTION, SOLUTION INTRAVENOUS at 06:43

## 2018-06-23 RX ADMIN — HEPARIN SODIUM 5000 UNITS: 5000 INJECTION, SOLUTION INTRAVENOUS; SUBCUTANEOUS at 13:06

## 2018-06-23 RX ADMIN — BUDESONIDE AND FORMOTEROL FUMARATE DIHYDRATE 2 PUFF: 160; 4.5 AEROSOL RESPIRATORY (INHALATION) at 09:35

## 2018-06-23 RX ADMIN — HYDROMORPHONE HYDROCHLORIDE 0.5 MG: 1 INJECTION, SOLUTION INTRAMUSCULAR; INTRAVENOUS; SUBCUTANEOUS at 21:21

## 2018-06-23 RX ADMIN — ALBUTEROL SULFATE 2.5 MG: 2.5 SOLUTION RESPIRATORY (INHALATION) at 23:15

## 2018-06-23 RX ADMIN — ALBUTEROL SULFATE 2.5 MG: 2.5 SOLUTION RESPIRATORY (INHALATION) at 09:15

## 2018-06-23 RX ADMIN — ACETAMINOPHEN 325 MG: 325 TABLET ORAL at 13:06

## 2018-06-23 RX ADMIN — Medication 400 MG: at 09:34

## 2018-06-23 RX ADMIN — ASPIRIN 81 MG 81 MG: 81 TABLET ORAL at 09:34

## 2018-06-23 RX ADMIN — Medication 3.38 G: at 00:09

## 2018-06-23 RX ADMIN — Medication 100 MG: at 09:34

## 2018-06-23 RX ADMIN — Medication 3.38 G: at 23:01

## 2018-06-23 RX ADMIN — Medication 400 MG: at 18:03

## 2018-06-23 RX ADMIN — ACETAMINOPHEN 325 MG: 325 TABLET ORAL at 06:42

## 2018-06-23 RX ADMIN — ALBUTEROL SULFATE 2.5 MG: 2.5 SOLUTION RESPIRATORY (INHALATION) at 19:09

## 2018-06-23 RX ADMIN — ATORVASTATIN CALCIUM 40 MG: 40 TABLET, FILM COATED ORAL at 18:02

## 2018-06-23 RX ADMIN — ALBUTEROL SULFATE 2 PUFF: 90 AEROSOL, METERED RESPIRATORY (INHALATION) at 21:31

## 2018-06-23 RX ADMIN — Medication 3.38 G: at 11:38

## 2018-06-23 RX ADMIN — PSYLLIUM HUSK 1 PACKET: 3.4 POWDER ORAL at 18:02

## 2018-06-23 RX ADMIN — NICOTINE 1 PATCH: 14 PATCH, EXTENDED RELEASE TRANSDERMAL at 09:38

## 2018-06-23 RX ADMIN — OXYCODONE HYDROCHLORIDE 5 MG: 5 TABLET ORAL at 13:10

## 2018-06-23 RX ADMIN — TRAMADOL HYDROCHLORIDE 50 MG: 50 TABLET, FILM COATED ORAL at 18:03

## 2018-06-23 RX ADMIN — Medication 3.38 G: at 06:42

## 2018-06-23 RX ADMIN — TIOTROPIUM BROMIDE 18 MCG: 18 CAPSULE ORAL; RESPIRATORY (INHALATION) at 09:36

## 2018-06-23 RX ADMIN — HEPARIN SODIUM 5000 UNITS: 5000 INJECTION, SOLUTION INTRAVENOUS; SUBCUTANEOUS at 06:42

## 2018-06-23 RX ADMIN — PSYLLIUM HUSK 1 PACKET: 3.4 POWDER ORAL at 09:34

## 2018-06-23 RX ADMIN — HYDROMORPHONE HYDROCHLORIDE 0.5 MG: 1 INJECTION, SOLUTION INTRAMUSCULAR; INTRAVENOUS; SUBCUTANEOUS at 16:00

## 2018-06-23 RX ADMIN — TRAMADOL HYDROCHLORIDE 50 MG: 50 TABLET, FILM COATED ORAL at 06:42

## 2018-06-23 NOTE — PROGRESS NOTES
Progress Note - Colorectal Surgery   Cesar Ill 66 y o  male MRN: [id number]  Unit/Bed#: [facility] 611-01 Encounter: [id number]    Assessment:  66 M with high output ileostomy and abdominal wound  -Found to have a small anastomotic leak on CT      Plan:  Diet as tolerated  Continue metamucil as tolerated  Wet to dry dressings daily  Accurate I&Os  Antibiotics for 10 days total  CTAP planned for Monday to evaluate anastomotic leak  SQH/SCDs  PT/OT-->STR  CM for placment      Subjective/Objective     Subjective: SUZAN  No N/V  +ileostomy function, no longer high output  Midline wound healthy in appearance  Objective:    Blood pressure 112/68, pulse 87, temperature 98 1 °F (36 7 °C), temperature source Oral, resp  rate 20, height 5' 7" (1 702 m), weight 72 kg (158 lb 11 7 oz), SpO2 92 %  ,Body mass index is 24 86 kg/m²        Intake/Output Summary (Last 24 hours) at 06/23/18 0023  Last data filed at 06/22/18 2334   Gross per 24 hour   Intake              890 ml   Output             1675 ml   Net             -785 ml       Invasive Devices     Peripheral Intravenous Line            Peripheral IV 06/22/18 Right Arm less than 1 day          Drain            Colostomy  RUQ 31 days                Physical Exam:   NAD  AAOx3  normal respiratory effort  soft, TTP LLQ, ND  Incision c/d/i with wet to dry  no c/c/e        Results from last 7 days  Lab Units 06/22/18  0822 06/21/18  0534 06/20/18  0516   WBC Thousand/uL 18 56* 18 22* 23 49*   HEMOGLOBIN g/dL 8 8* 9 7* 10 0*   HEMATOCRIT % 29 1* 32 1* 32 9*   PLATELETS Thousands/uL 344 331 354       Results from last 7 days  Lab Units 06/22/18  0532 06/20/18  0516 06/19/18  0430   SODIUM mmol/L 141 137 138   POTASSIUM mmol/L 4 2 4 3 4 0   CHLORIDE mmol/L 102 103 104   CO2 mmol/L 29 28 21   BUN mg/dL 9 12 11   CREATININE mg/dL 0 80 0 71 0 73   GLUCOSE RANDOM mg/dL 78 63* 81   CALCIUM mg/dL 7 8* 8 2* 7 0*

## 2018-06-24 ENCOUNTER — APPOINTMENT (INPATIENT)
Dept: RADIOLOGY | Facility: HOSPITAL | Age: 58
DRG: 720 | End: 2018-06-24
Payer: COMMERCIAL

## 2018-06-24 LAB
ANION GAP SERPL CALCULATED.3IONS-SCNC: 15 MMOL/L (ref 4–13)
BASOPHILS # BLD MANUAL: 0 THOUSAND/UL (ref 0–0.1)
BASOPHILS NFR MAR MANUAL: 0 % (ref 0–1)
BUN SERPL-MCNC: 7 MG/DL (ref 5–25)
CALCIUM SERPL-MCNC: 8.3 MG/DL (ref 8.3–10.1)
CHLORIDE SERPL-SCNC: 105 MMOL/L (ref 100–108)
CO2 SERPL-SCNC: 19 MMOL/L (ref 21–32)
CREAT SERPL-MCNC: 0.85 MG/DL (ref 0.6–1.3)
EOSINOPHIL # BLD MANUAL: 0 THOUSAND/UL (ref 0–0.4)
EOSINOPHIL NFR BLD MANUAL: 0 % (ref 0–6)
ERYTHROCYTE [DISTWIDTH] IN BLOOD BY AUTOMATED COUNT: 16.8 % (ref 11.6–15.1)
GFR SERPL CREATININE-BSD FRML MDRD: 97 ML/MIN/1.73SQ M
GLUCOSE SERPL-MCNC: 70 MG/DL (ref 65–140)
HCT VFR BLD AUTO: 31.5 % (ref 36.5–49.3)
HGB BLD-MCNC: 9.2 G/DL (ref 12–17)
LYMPHOCYTES # BLD AUTO: 1.84 THOUSAND/UL (ref 0.6–4.47)
LYMPHOCYTES # BLD AUTO: 10 % (ref 14–44)
MCH RBC QN AUTO: 29.9 PG (ref 26.8–34.3)
MCHC RBC AUTO-ENTMCNC: 29.2 G/DL (ref 31.4–37.4)
MCV RBC AUTO: 102 FL (ref 82–98)
METAMYELOCYTES NFR BLD MANUAL: 1 % (ref 0–1)
MONOCYTES # BLD AUTO: 0.37 THOUSAND/UL (ref 0–1.22)
MONOCYTES NFR BLD: 2 % (ref 4–12)
MYELOCYTES NFR BLD MANUAL: 1 % (ref 0–1)
NEUTROPHILS # BLD MANUAL: 15.86 THOUSAND/UL (ref 1.85–7.62)
NEUTS SEG NFR BLD AUTO: 86 % (ref 43–75)
NRBC BLD AUTO-RTO: 0 /100 WBCS
PLATELET # BLD AUTO: 357 THOUSANDS/UL (ref 149–390)
PLATELET BLD QL SMEAR: ADEQUATE
PMV BLD AUTO: 9.6 FL (ref 8.9–12.7)
POTASSIUM SERPL-SCNC: 4.9 MMOL/L (ref 3.5–5.3)
RBC # BLD AUTO: 3.08 MILLION/UL (ref 3.88–5.62)
SODIUM SERPL-SCNC: 139 MMOL/L (ref 136–145)
TOTAL CELLS COUNTED SPEC: 100
WBC # BLD AUTO: 18.44 THOUSAND/UL (ref 4.31–10.16)

## 2018-06-24 PROCEDURE — 80048 BASIC METABOLIC PNL TOTAL CA: CPT | Performed by: STUDENT IN AN ORGANIZED HEALTH CARE EDUCATION/TRAINING PROGRAM

## 2018-06-24 PROCEDURE — 85007 BL SMEAR W/DIFF WBC COUNT: CPT | Performed by: STUDENT IN AN ORGANIZED HEALTH CARE EDUCATION/TRAINING PROGRAM

## 2018-06-24 PROCEDURE — 74177 CT ABD & PELVIS W/CONTRAST: CPT

## 2018-06-24 PROCEDURE — 94760 N-INVAS EAR/PLS OXIMETRY 1: CPT

## 2018-06-24 PROCEDURE — 85027 COMPLETE CBC AUTOMATED: CPT | Performed by: STUDENT IN AN ORGANIZED HEALTH CARE EDUCATION/TRAINING PROGRAM

## 2018-06-24 PROCEDURE — 94640 AIRWAY INHALATION TREATMENT: CPT

## 2018-06-24 RX ADMIN — Medication 30000 UNITS: at 09:40

## 2018-06-24 RX ADMIN — ATORVASTATIN CALCIUM 40 MG: 40 TABLET, FILM COATED ORAL at 19:26

## 2018-06-24 RX ADMIN — Medication 3.38 G: at 11:38

## 2018-06-24 RX ADMIN — TRAMADOL HYDROCHLORIDE 50 MG: 50 TABLET, FILM COATED ORAL at 05:52

## 2018-06-24 RX ADMIN — Medication 400 MG: at 09:40

## 2018-06-24 RX ADMIN — FOLIC ACID 1 MG: 1 TABLET ORAL at 09:40

## 2018-06-24 RX ADMIN — BUDESONIDE AND FORMOTEROL FUMARATE DIHYDRATE 2 PUFF: 160; 4.5 AEROSOL RESPIRATORY (INHALATION) at 19:23

## 2018-06-24 RX ADMIN — Medication 400 MG: at 19:23

## 2018-06-24 RX ADMIN — TRAMADOL HYDROCHLORIDE 50 MG: 50 TABLET, FILM COATED ORAL at 20:27

## 2018-06-24 RX ADMIN — HEPARIN SODIUM 5000 UNITS: 5000 INJECTION, SOLUTION INTRAVENOUS; SUBCUTANEOUS at 13:40

## 2018-06-24 RX ADMIN — ACETAMINOPHEN 325 MG: 325 TABLET ORAL at 21:37

## 2018-06-24 RX ADMIN — Medication 100 MG: at 09:40

## 2018-06-24 RX ADMIN — IOHEXOL 100 ML: 350 INJECTION, SOLUTION INTRAVENOUS at 13:13

## 2018-06-24 RX ADMIN — ASPIRIN 81 MG 81 MG: 81 TABLET ORAL at 09:40

## 2018-06-24 RX ADMIN — BUDESONIDE AND FORMOTEROL FUMARATE DIHYDRATE 2 PUFF: 160; 4.5 AEROSOL RESPIRATORY (INHALATION) at 09:41

## 2018-06-24 RX ADMIN — ACETAMINOPHEN 325 MG: 325 TABLET ORAL at 13:41

## 2018-06-24 RX ADMIN — OXYCODONE HYDROCHLORIDE 5 MG: 5 TABLET ORAL at 19:30

## 2018-06-24 RX ADMIN — AMIODARONE HYDROCHLORIDE 200 MG: 200 TABLET ORAL at 09:40

## 2018-06-24 RX ADMIN — ALBUTEROL SULFATE 2.5 MG: 2.5 SOLUTION RESPIRATORY (INHALATION) at 09:06

## 2018-06-24 RX ADMIN — Medication 3.38 G: at 23:31

## 2018-06-24 RX ADMIN — HYDROMORPHONE HYDROCHLORIDE 0.5 MG: 1 INJECTION, SOLUTION INTRAMUSCULAR; INTRAVENOUS; SUBCUTANEOUS at 05:52

## 2018-06-24 RX ADMIN — Medication 3.38 G: at 05:48

## 2018-06-24 RX ADMIN — HYDROMORPHONE HYDROCHLORIDE 0.5 MG: 1 INJECTION, SOLUTION INTRAMUSCULAR; INTRAVENOUS; SUBCUTANEOUS at 23:31

## 2018-06-24 RX ADMIN — ACETAMINOPHEN 325 MG: 325 TABLET ORAL at 05:52

## 2018-06-24 RX ADMIN — Medication 3.38 G: at 19:24

## 2018-06-24 RX ADMIN — HEPARIN SODIUM 5000 UNITS: 5000 INJECTION, SOLUTION INTRAVENOUS; SUBCUTANEOUS at 05:52

## 2018-06-24 RX ADMIN — ALBUTEROL SULFATE 2.5 MG: 2.5 SOLUTION RESPIRATORY (INHALATION) at 20:00

## 2018-06-24 RX ADMIN — HEPARIN SODIUM 5000 UNITS: 5000 INJECTION, SOLUTION INTRAVENOUS; SUBCUTANEOUS at 21:36

## 2018-06-24 RX ADMIN — NICOTINE 1 PATCH: 14 PATCH, EXTENDED RELEASE TRANSDERMAL at 09:40

## 2018-06-24 RX ADMIN — HYDROMORPHONE HYDROCHLORIDE 0.5 MG: 1 INJECTION, SOLUTION INTRAMUSCULAR; INTRAVENOUS; SUBCUTANEOUS at 15:41

## 2018-06-24 RX ADMIN — PSYLLIUM HUSK 1 PACKET: 3.4 POWDER ORAL at 19:24

## 2018-06-24 RX ADMIN — OXYCODONE HYDROCHLORIDE 5 MG: 5 TABLET ORAL at 14:35

## 2018-06-24 RX ADMIN — HYDROMORPHONE HYDROCHLORIDE 0.5 MG: 1 INJECTION, SOLUTION INTRAMUSCULAR; INTRAVENOUS; SUBCUTANEOUS at 20:28

## 2018-06-24 RX ADMIN — ALBUTEROL SULFATE 2.5 MG: 2.5 SOLUTION RESPIRATORY (INHALATION) at 15:57

## 2018-06-24 RX ADMIN — TIOTROPIUM BROMIDE 18 MCG: 18 CAPSULE ORAL; RESPIRATORY (INHALATION) at 09:41

## 2018-06-24 NOTE — SOCIAL WORK
CM met with pt to f/u for SNF choices  Pt requesting referral to [facility] only  CM advised [facility] likelihood [facility] unable to [facility] as [facility] not take [facility] plans  Pt still requesting referral  Referral made via [facility]  AZUCENA inquired of additional SNF choices  Pt reports he only wanted to go to  SNF  Pt will consider reviewing SNF list for additional choices

## 2018-06-24 NOTE — PROGRESS NOTES
Progress Note - Colorectal Surgery   Kadeem Pelayo 66 y o  male MRN: [id number]  Unit/Bed#: [facility] 611-01 Encounter: [id number]    Assessment:  66 M with high output ileostomy and abdominal wound  -Found to have a small anastomotic leak on CT      Plan:  Diet as tolerated  CTAP planned for Monday to evaluate anastomotic leak  Wet to dry dressing abdominal wound daily  Continue metamucil as tolerated  Antibiotics for 10 days total  SQH/SCDs  PT/OT-->STR  CM for placement-patient has chosen potential placement      Subjective/Objective     Subjective: SUZAN  Tolerating PO  Having ileostomy function  Has intermittent sharp pain in abdomen which resolve  Objective:    Blood pressure 106/68, pulse 87, temperature 98 1 °F (36 7 °C), temperature source Oral, resp  rate 20, height 5' 7" (1 702 m), weight 72 5 kg (159 lb 13 3 oz), SpO2 94 %  ,Body mass index is 25 03 kg/m²        Intake/Output Summary (Last 24 hours) at 06/24/18 0610  Last data filed at 06/24/18 0601   Gross per 24 hour   Intake             1640 ml   Output             2410 ml   Net             -770 ml       Invasive Devices     Peripheral Intravenous Line            Peripheral IV 06/22/18 Right Arm 1 day          Drain            Colostomy  RUQ 32 days                Physical Exam:  NAD  AAOx3  normal respiratory effort  soft, NT, ND  ileostomy pink and functioning  Incision healthy in appearance  no c/c/e        Results from last 7 days  Lab Units 06/23/18  0607 06/22/18  0822 06/21/18  0534   WBC Thousand/uL 17 77* 18 56* 18 22*   HEMOGLOBIN g/dL 8 7* 8 8* 9 7*   HEMATOCRIT % 29 0* 29 1* 32 1*   PLATELETS Thousands/uL 352 344 331       Results from last 7 days  Lab Units 06/23/18  0607 06/22/18  0532 06/20/18  0516   SODIUM mmol/L 139 141 137   POTASSIUM mmol/L 4 1 4 2 4 3   CHLORIDE mmol/L 102 102 103   CO2 mmol/L 31 29 28   BUN mg/dL 8 9 12   CREATININE mg/dL 0 74 0 80 0 71   GLUCOSE RANDOM mg/dL 73 78 63*   CALCIUM mg/dL 7 9* 7 8* 8 2*

## 2018-06-24 NOTE — RESTORATIVE TECHNICIAN NOTE
Restorative Specialist Mobility Note       Activity: Ambulate in gleason, Ambulate in room, Bathroom privileges, Dangle, Stand at bedside (Educated/encouraged pt to ambulate with assistance 3-4 x's/day  Bed alarm on   Pt callbell, phone/tray within reach )     Assistive Device: Front wheel walker (NC O2 on 2L)       Huseyin GALAN, Restorative Technician, [employer]

## 2018-06-25 LAB
ANION GAP SERPL CALCULATED.3IONS-SCNC: 8 MMOL/L (ref 4–13)
ANISOCYTOSIS BLD QL SMEAR: PRESENT
BASOPHILS # BLD MANUAL: 0 THOUSAND/UL (ref 0–0.1)
BASOPHILS NFR MAR MANUAL: 0 % (ref 0–1)
BUN SERPL-MCNC: 7 MG/DL (ref 5–25)
CALCIUM SERPL-MCNC: 8.4 MG/DL (ref 8.3–10.1)
CHLORIDE SERPL-SCNC: 102 MMOL/L (ref 100–108)
CO2 SERPL-SCNC: 28 MMOL/L (ref 21–32)
CREAT SERPL-MCNC: 0.7 MG/DL (ref 0.6–1.3)
EOSINOPHIL # BLD MANUAL: 0 THOUSAND/UL (ref 0–0.4)
EOSINOPHIL NFR BLD MANUAL: 0 % (ref 0–6)
ERYTHROCYTE [DISTWIDTH] IN BLOOD BY AUTOMATED COUNT: 16.6 % (ref 11.6–15.1)
GFR SERPL CREATININE-BSD FRML MDRD: 105 ML/MIN/1.73SQ M
GLUCOSE SERPL-MCNC: 75 MG/DL (ref 65–140)
HCT VFR BLD AUTO: 26.7 % (ref 36.5–49.3)
HGB BLD-MCNC: 8 G/DL (ref 12–17)
INR PPP: 1.07 (ref 0.86–1.17)
LYMPHOCYTES # BLD AUTO: 11 % (ref 14–44)
LYMPHOCYTES # BLD AUTO: 2.1 THOUSAND/UL (ref 0.6–4.47)
MCH RBC QN AUTO: 29.6 PG (ref 26.8–34.3)
MCHC RBC AUTO-ENTMCNC: 30 G/DL (ref 31.4–37.4)
MCV RBC AUTO: 99 FL (ref 82–98)
METAMYELOCYTES NFR BLD MANUAL: 1 % (ref 0–1)
MONOCYTES # BLD AUTO: 0.96 THOUSAND/UL (ref 0–1.22)
MONOCYTES NFR BLD: 5 % (ref 4–12)
MYELOCYTES NFR BLD MANUAL: 1 % (ref 0–1)
NEUTROPHILS # BLD MANUAL: 15.67 THOUSAND/UL (ref 1.85–7.62)
NEUTS BAND NFR BLD MANUAL: 1 % (ref 0–8)
NEUTS SEG NFR BLD AUTO: 81 % (ref 43–75)
NRBC BLD AUTO-RTO: 0 /100 WBCS
PLATELET # BLD AUTO: 404 THOUSANDS/UL (ref 149–390)
PLATELET BLD QL SMEAR: ABNORMAL
PMV BLD AUTO: 9.7 FL (ref 8.9–12.7)
POTASSIUM SERPL-SCNC: 4 MMOL/L (ref 3.5–5.3)
PROTHROMBIN TIME: 14 SECONDS (ref 11.8–14.2)
RBC # BLD AUTO: 2.7 MILLION/UL (ref 3.88–5.62)
RBC MORPH BLD: PRESENT
SODIUM SERPL-SCNC: 138 MMOL/L (ref 136–145)
TOTAL CELLS COUNTED SPEC: 100
WBC # BLD AUTO: 19.11 THOUSAND/UL (ref 4.31–10.16)

## 2018-06-25 PROCEDURE — 94640 AIRWAY INHALATION TREATMENT: CPT

## 2018-06-25 PROCEDURE — 80048 BASIC METABOLIC PNL TOTAL CA: CPT | Performed by: STUDENT IN AN ORGANIZED HEALTH CARE EDUCATION/TRAINING PROGRAM

## 2018-06-25 PROCEDURE — 85027 COMPLETE CBC AUTOMATED: CPT | Performed by: STUDENT IN AN ORGANIZED HEALTH CARE EDUCATION/TRAINING PROGRAM

## 2018-06-25 PROCEDURE — 85610 PROTHROMBIN TIME: CPT | Performed by: STUDENT IN AN ORGANIZED HEALTH CARE EDUCATION/TRAINING PROGRAM

## 2018-06-25 PROCEDURE — 85007 BL SMEAR W/DIFF WBC COUNT: CPT | Performed by: STUDENT IN AN ORGANIZED HEALTH CARE EDUCATION/TRAINING PROGRAM

## 2018-06-25 PROCEDURE — 94760 N-INVAS EAR/PLS OXIMETRY 1: CPT

## 2018-06-25 RX ADMIN — Medication 3.38 G: at 20:11

## 2018-06-25 RX ADMIN — Medication 100 MG: at 10:05

## 2018-06-25 RX ADMIN — PSYLLIUM HUSK 1 PACKET: 3.4 POWDER ORAL at 17:22

## 2018-06-25 RX ADMIN — Medication 400 MG: at 10:05

## 2018-06-25 RX ADMIN — BUDESONIDE AND FORMOTEROL FUMARATE DIHYDRATE 2 PUFF: 160; 4.5 AEROSOL RESPIRATORY (INHALATION) at 10:09

## 2018-06-25 RX ADMIN — ATORVASTATIN CALCIUM 40 MG: 40 TABLET, FILM COATED ORAL at 17:20

## 2018-06-25 RX ADMIN — HYDROMORPHONE HYDROCHLORIDE 0.5 MG: 1 INJECTION, SOLUTION INTRAMUSCULAR; INTRAVENOUS; SUBCUTANEOUS at 20:49

## 2018-06-25 RX ADMIN — HYDROMORPHONE HYDROCHLORIDE 0.5 MG: 1 INJECTION, SOLUTION INTRAMUSCULAR; INTRAVENOUS; SUBCUTANEOUS at 17:32

## 2018-06-25 RX ADMIN — HYDROMORPHONE HYDROCHLORIDE 0.5 MG: 1 INJECTION, SOLUTION INTRAMUSCULAR; INTRAVENOUS; SUBCUTANEOUS at 09:59

## 2018-06-25 RX ADMIN — HYDROMORPHONE HYDROCHLORIDE 0.5 MG: 1 INJECTION, SOLUTION INTRAMUSCULAR; INTRAVENOUS; SUBCUTANEOUS at 06:00

## 2018-06-25 RX ADMIN — TIOTROPIUM BROMIDE 18 MCG: 18 CAPSULE ORAL; RESPIRATORY (INHALATION) at 10:16

## 2018-06-25 RX ADMIN — TRAMADOL HYDROCHLORIDE 50 MG: 50 TABLET, FILM COATED ORAL at 05:56

## 2018-06-25 RX ADMIN — NICOTINE 1 PATCH: 14 PATCH, EXTENDED RELEASE TRANSDERMAL at 10:09

## 2018-06-25 RX ADMIN — ASPIRIN 81 MG 81 MG: 81 TABLET ORAL at 10:05

## 2018-06-25 RX ADMIN — Medication 30000 UNITS: at 10:12

## 2018-06-25 RX ADMIN — Medication 400 MG: at 17:20

## 2018-06-25 RX ADMIN — Medication 3.38 G: at 05:56

## 2018-06-25 RX ADMIN — ACETAMINOPHEN 325 MG: 325 TABLET ORAL at 21:20

## 2018-06-25 RX ADMIN — ALBUTEROL SULFATE 2.5 MG: 2.5 SOLUTION RESPIRATORY (INHALATION) at 08:36

## 2018-06-25 RX ADMIN — ACETAMINOPHEN 325 MG: 325 TABLET ORAL at 14:19

## 2018-06-25 RX ADMIN — Medication 3.38 G: at 14:20

## 2018-06-25 RX ADMIN — ACETAMINOPHEN 325 MG: 325 TABLET ORAL at 05:56

## 2018-06-25 RX ADMIN — FOLIC ACID 1 MG: 1 TABLET ORAL at 10:05

## 2018-06-25 RX ADMIN — AMIODARONE HYDROCHLORIDE 200 MG: 200 TABLET ORAL at 10:05

## 2018-06-25 RX ADMIN — OXYCODONE HYDROCHLORIDE 5 MG: 5 TABLET ORAL at 10:21

## 2018-06-25 RX ADMIN — BUDESONIDE AND FORMOTEROL FUMARATE DIHYDRATE 2 PUFF: 160; 4.5 AEROSOL RESPIRATORY (INHALATION) at 17:22

## 2018-06-25 RX ADMIN — HEPARIN SODIUM 5000 UNITS: 5000 INJECTION, SOLUTION INTRAVENOUS; SUBCUTANEOUS at 05:56

## 2018-06-25 RX ADMIN — TRAMADOL HYDROCHLORIDE 50 MG: 50 TABLET, FILM COATED ORAL at 17:21

## 2018-06-25 RX ADMIN — HYDROMORPHONE HYDROCHLORIDE 0.5 MG: 1 INJECTION, SOLUTION INTRAMUSCULAR; INTRAVENOUS; SUBCUTANEOUS at 14:19

## 2018-06-25 NOTE — CASE MANAGEMENT
Continued Stay Review    Thank you,  [facility] in the [facility] by Amrit Valle[facility] for 2017  Network Utilization Review Department  Phone: [phone]; Fax [phone]  ATTENTION: The Network Utilization Review Department is now centralized for our 7 Facilities  Make a note that we have a new phone and fax numbers for our Department  Please call with any questions or concerns to [phone] and carefully follow the prompts so that you are directed to the right person  All voicemails are confidential  Fax any determinations, approvals, denials, and requests for initial or continue stay review clinical to [phone]  Due to HIGH CALL volume, it would be easier if you could please send faxed requests to expedite your requests and in part, help us provide discharge notifications faster      Date: 6/25/2018    Vital Signs: /71 (BP Location: Right arm)   Pulse 81   Temp 98 7 °F (37 1 °C) (Oral)   Resp 18   Ht 5' 7" (1 702 m)   Wt 72 5 kg (159 lb 13 3 oz)   SpO2 94%   BMI 25 03 kg/m²     Medications:   Scheduled Meds:   Current Facility-Administered Medications:  acetaminophen 325 mg Oral Q8H [address]    acetaminophen 650 mg Oral Q6H PRN    albuterol 2 puff Inhalation Q4H PRN 6/23 x 3  6/24 x 3  6/25 x 1    albuterol 2 5 mg Nebulization Q4H PRN    amiodarone 200 mg Oral Daily    aspirin 81 mg Oral Daily    atorvastatin 40 mg Oral Daily With Dinner    barium sulfate 900 mL Oral Once in imaging    budesonide-formoterol 2 puff Inhalation BID    folic acid 1 mg Oral Daily    heparin (porcine) 5,000 Units Subcutaneous Q8H [address]    HYDROmorphone 0 5 mg Intravenous Q3H PRN 6/23 x 2  6/24 x 4  6/25 x 2    LORazepam 1 mg Oral Q8H PRN    magnesium oxide 400 mg Oral BID    nicotine 1 patch Transdermal Daily    ondansetron 4 mg Intravenous Q6H PRN    oxyCODONE 5 mg Oral Q6H PRN 6/23 x 2  6/24 x 2  6/25 x 1    piperacillin-tazobactam 3 375 g Intravenous Q6H    pneumococcal 23-valent polysaccharide vaccine 0 5 mL Subcutaneous Prior to discharge    psyllium 1 packet Oral 4x Daily    thiamine 100 mg Oral Daily    tiotropium 18 mcg Inhalation Daily    traMADol 50 mg Oral Q12H    vitamin A 30,000 Units Oral Daily      Nursing orders - VS q 4 - Wound care qd - daily weights - I & O q shift - Fall precautions - Up with assistance - Diet NPO on 6/25- PT/OT treatment    Abnormal Labs/Diagnostic Results:      Ref Range & Units 6/25/18 0724 6/24/18 0552 6/23/18 0607 6/22/18 0822 6/21/18 0534   WBC 4 31 - 10 16 Thousand/uL 19 11   18 44   17 77   18 56   18 22     RBC 3 88 - 5 62 Million/uL 2 70   3 08   2 94   2 94   3 24     Hemoglobin 12 0 - 17 0 g/dL 8 0   9 2   8 7   8 8   9 7     Hematocrit 36 5 - 49 3 % 26 7   31 5   29 0   29 1   32 1     MCV 82 - 98 fL 99   102   99   99   99     MCH 26 8 - 34 3 pg 29 6  29 9  29 6  29 9  29 9    MCHC 31 4 - 37 4 g/dL 30 0   29 2   30 0   30 2   30 2     RDW 11 6 - 15 1 % 16 6   16 8   16 3   16 1   16 3     MPV 8 9 - 12 7 fL 9 7  9 6  9 6  9 6  9 7    Platelets 728 - 497 Thousands/uL 404   357  352  344  331    nRBC /100 WBCs 0  0CM  0CM  0                Ref Range & Units 6/25/18 0724 6/24/18 0552 6/23/18 0607 6/22/18 0532 6/20/18 0516   Sodium 136 - 145 mmol/L 138  139  139  141  137    Potassium 3 5 - 5 3 mmol/L 4 0  4 9CM  4 1  4 2  4 3    Chloride 100 - 108 mmol/L 102  105  102  102  103    CO2 21 - 32 mmol/L 28  19   31  29  28    Anion Gap 4 - 13 mmol/L 8  15   6  10  6    BUN 5 - 25 mg/dL 7  7  8  9  12    Creatinine 0 60 - 1 30 mg/dL 0 70  0 85CM  0 74CM  0 80CM  0 71CM    Glucose 65 - 140 mg/dL 75  70CM  73CM  78CM  63CM     Calcium 8 3 - 10 1 mg/dL 8 4  8 3  7 9   7 8   8 2     eGFR ml/min/1 73sq m 105  97  103  100  105            CT A & P - 6/24 - 1   Extraluminal gas near the ileocolonic anastomosis is unchanged at site of perforation/anastomotic leak   Developing sinus tract is not excluded given unchanged configuration of the gas   No defined abscess near the anastomosis  2   Several small collections in the abdomen, most of which are in close association with adjacent loops of bowel   The largest measures 3 8 x 1 9 cm in the left midabdomen and shows mild peripheral enhancement   These may represent small abscesses     These are unchanged in size since the prior CT, though they were not well-seen prior due to lack of contrast   3   Prominent loops of small bowel in the left midabdomen are similar in size to prior   Contrast has reached the diverting loop ileostomy at time of scanning  4   Unchanged diffuse severe mesenteric edema      Interventional Radiology  Consult on 6/25    Age/Sex: 66 y o  male     Assessment/Plan:   Progress note  6/25  Assessment:  66 M with high output ileostomy and abdominal wound  -Found to have a small anastomotic leak on CT 6/20  -6/24 CTAP scan shows stable collections, mostly likely abscesses, and stable dot of free air possible developing fistula tract    Plan:  NPO incase IR wanted for drainage  Wet to dry dressing abdominal wound daily  Continue metamucil as tolerated  On Zosyn day 6/10  SQH/SCDs  PT/OT-->STR  CM for placement-referral placed

## 2018-06-25 NOTE — PROGRESS NOTES
Progress Note - Colorectal Surgery   Radha Kirkland 66 y o  male MRN: [id number]  Unit/Bed#: [facility] 611-01 Encounter: [id number]    Assessment:  66 M with high output ileostomy and abdominal wound  -Found to have a small anastomotic leak on CT 6/20  -6/24 CTAP scan shows stable collections, mostly likely abscesses, and stable dot of free air possible developing fistula tract      Plan:  NPO incase IR wanted for drainage  Wet to dry dressing abdominal wound daily  Continue metamucil as tolerated  On Zosyn day 6/10  SQH/SCDs  PT/OT-->STR  CM for placement-referral placed      Subjective/Objective     Subjective: SUZAN  CT scan ordered for today scheduled early  No complaints  RLQ pain to deep palpation  Tolerating PO  Ilesostomy function  Objective:    Blood pressure 130/76, pulse 84, temperature 98 3 °F (36 8 °C), temperature source Oral, resp  rate 20, height 5' 7" (1 702 m), weight 72 5 kg (159 lb 13 3 oz), SpO2 96 %  ,Body mass index is 25 03 kg/m²        Intake/Output Summary (Last 24 hours) at 06/25/18 0254  Last data filed at 06/25/18 0101   Gross per 24 hour   Intake             1990 ml   Output             3100 ml   Net            -1110 ml       Invasive Devices     Peripheral Intravenous Line            Peripheral IV 06/24/18 Right;Ventral (anterior) Forearm less than 1 day          Drain            Colostomy  RUQ 33 days                Physical Exam:  AAOx3  NAD  Normal respiratory effort  Soft, RLQ tender to deep palpation, ND  Incision c/d/i with packing  Ileostomy pink and functioning  No c/c/e      Results from last 7 days  Lab Units 06/24/18  0552 06/23/18  0607 06/22/18  0822   WBC Thousand/uL 18 44* 17 77* 18 56*   HEMOGLOBIN g/dL 9 2* 8 7* 8 8*   HEMATOCRIT % 31 5* 29 0* 29 1*   PLATELETS Thousands/uL 357 352 344       Results from last 7 days  Lab Units 06/24/18  0552 06/23/18  0607 06/22/18  0532   SODIUM mmol/L 139 139 141   POTASSIUM mmol/L 4 9 4 1 4 2   CHLORIDE mmol/L 105 102 102   CO2 mmol/L 19* 31 29   BUN mg/dL 7 8 9   CREATININE mg/dL 0 85 0 74 0 80   GLUCOSE RANDOM mg/dL 70 73 78   CALCIUM mg/dL 8 3 7 9* 7 8*

## 2018-06-25 NOTE — PROGRESS NOTES
Consultation - Interventional Radiology  Marilyn Lynn 66 y o  male MRN: [id number]  Unit/Bed#: [facility] 182-53 Encounter: [id number]      History of Present Illness   Physician Requesting Consult: Eboni Roger MD  Reason for Consult / Principal Problem:  Abdominal abscess  HPI: Marilyn Lynn is a 66y o  year old male who presents with abdominal abscess for which drainage is requested  Consults    Review of Systems    Historical Information   Past Medical History:   Diagnosis Date    Asthma     Cardiac disease     Colitis     Colon polyps     [facility]     Coronary artery disease     Diverticulitis     Hyperlipidemia     Hypertension     Myocardial infarction ([facility] )     Perforation of colon ([facility] )     Ulcerative colitis ([place] [address] )      Past Surgical History:   Procedure Laterality Date    ANGIOPLASTY      COLON SURGERY      COLONOSCOPY N/A 10/24/2016    Procedure: COLONOSCOPY;  Surgeon: Eboni Roger MD;  [facility]: [facility]; Service:     CORONARY ANGIOPLASTY WITH STENT PLACEMENT      ESOPHAGOGASTRODUODENOSCOPY N/A 10/24/2016    Procedure: ESOPHAGOGASTRODUODENOSCOPY (EGD); Surgeon: Eboni Roger MD;  [facility]: [facility]; Service:     EXPLORATORY LAPAROTOMY W/ BOWEL RESECTION N/A 5/22/2018    Procedure: EXPLORATORY LAPAROTOMY, ILIOCOLECTOMY, ILIOCOLONIC ANASTAMOSIS, LOOP ILIOSTOMY, REPAIR OF SEROSAL TEAR, EXTENSIVE LYSIS OF ADHESIONS, WOUND VAC PLACEMENT;  Surgeon: Eboni Roger MD;  [facility];  Service: Colorectal    IA COLONOSCOPY FLX DX W/COLLJ SPEC WHEN PFRMD N/A 2/6/2018    Procedure: COLONOSCOPY;  Surgeon: Eboni Roger MD;  [facility];   Service: Colorectal    TONSILLECTOMY       Social History   History   Alcohol Use    Yes     Comment: kenji occassionally     History   Drug Use No     History   Smoking Status    Current Every Day Smoker    Types: Cigars   Smokeless Tobacco    Never Used Family History: non-contributory}    Meds/Allergies   all current active meds have been reviewed  Allergies   Allergen Reactions    Other      Brown cloth band aids       Latex Rash       Objective   Vitals: Blood pressure 109/71, pulse 81, temperature 98 7 °F (37 1 °C), temperature source Oral, resp  rate 18, height 5' 7" (1 702 m), weight 72 5 kg (159 lb 13 3 oz), SpO2 94 %  ,Body mass index is 25 03 kg/m²  Intake/Output Summary (Last 24 hours) at 06/25/18 1542  Last data filed at 06/25/18 1400   Gross per 24 hour   Intake              680 ml   Output             2300 ml   Net            -1620 ml     Invasive Devices     Peripheral Intravenous Line            Peripheral IV 06/24/18 Right;Ventral (anterior) Forearm less than 1 day          Drain            Colostomy  RUQ 34 days              Physical Exam: none    Lab Results: I have personally reviewed pertinent reports  Imaging Studies: I have personally reviewed pertinent reports  and I have personally reviewed pertinent films in PACS with a Radiologist   EKG, Pathology, and Other Studies: I have personally reviewed pertinent films in PACS with a Radiologist     Assessment/Plan      Assessment:  Patient with several smaller intraabdominal enhancing collections consistent with abscess  These are not enlarged from the previous exam, but leukocytosis continues  The largest collection is either adherent to the posterior wall of small bowel, or may be intramural  This increases the risk of causing a fistula with drain placement, and therefore this was deferred at this time given that the size of the collection has not changed  Plan:    No drain placement at this time   The collection is accessible via percutaneous access, however the largest collection is either adherent to the posterior wall of a loop of small bowel or may be within the lumen, and concern for causing a small bowel fistula is greater than the benefit in a patient with a non enlarging abscess

## 2018-06-25 NOTE — SOCIAL WORK
S/w pt to obtain additional rehab choices  Reviewed list & pt only wants facilities near him in [place]  Agreeable to referrals to [facility], [facility] & [facility]  Referrals sent

## 2018-06-26 LAB
ANISOCYTOSIS BLD QL SMEAR: PRESENT
BASOPHILS # BLD MANUAL: 0 THOUSAND/UL (ref 0–0.1)
BASOPHILS NFR MAR MANUAL: 0 % (ref 0–1)
EOSINOPHIL # BLD MANUAL: 0 THOUSAND/UL (ref 0–0.4)
EOSINOPHIL NFR BLD MANUAL: 0 % (ref 0–6)
ERYTHROCYTE [DISTWIDTH] IN BLOOD BY AUTOMATED COUNT: 16.7 % (ref 11.6–15.1)
HCT VFR BLD AUTO: 32.6 % (ref 36.5–49.3)
HGB BLD-MCNC: 9.6 G/DL (ref 12–17)
LYMPHOCYTES # BLD AUTO: 0.89 THOUSAND/UL (ref 0.6–4.47)
LYMPHOCYTES # BLD AUTO: 4 % (ref 14–44)
MACROCYTES BLD QL AUTO: PRESENT
MCH RBC QN AUTO: 30.2 PG (ref 26.8–34.3)
MCHC RBC AUTO-ENTMCNC: 29.4 G/DL (ref 31.4–37.4)
MCV RBC AUTO: 103 FL (ref 82–98)
METAMYELOCYTES NFR BLD MANUAL: 1 % (ref 0–1)
MONOCYTES # BLD AUTO: 0.89 THOUSAND/UL (ref 0–1.22)
MONOCYTES NFR BLD: 4 % (ref 4–12)
MYELOCYTES NFR BLD MANUAL: 2 % (ref 0–1)
NEUTROPHILS # BLD MANUAL: 19.86 THOUSAND/UL (ref 1.85–7.62)
NEUTS BAND NFR BLD MANUAL: 1 % (ref 0–8)
NEUTS SEG NFR BLD AUTO: 88 % (ref 43–75)
NRBC BLD AUTO-RTO: 0 /100 WBCS
PLATELET # BLD AUTO: 387 THOUSANDS/UL (ref 149–390)
PLATELET BLD QL SMEAR: ADEQUATE
PMV BLD AUTO: 10 FL (ref 8.9–12.7)
RBC # BLD AUTO: 3.18 MILLION/UL (ref 3.88–5.62)
RBC MORPH BLD: PRESENT
WBC # BLD AUTO: 22.32 THOUSAND/UL (ref 4.31–10.16)

## 2018-06-26 PROCEDURE — 85027 COMPLETE CBC AUTOMATED: CPT | Performed by: SURGERY

## 2018-06-26 PROCEDURE — 94760 N-INVAS EAR/PLS OXIMETRY 1: CPT

## 2018-06-26 PROCEDURE — 94640 AIRWAY INHALATION TREATMENT: CPT

## 2018-06-26 PROCEDURE — 85007 BL SMEAR W/DIFF WBC COUNT: CPT | Performed by: SURGERY

## 2018-06-26 RX ORDER — OXYCODONE HYDROCHLORIDE 10 MG/1
10 TABLET ORAL EVERY 4 HOURS PRN
Status: DISCONTINUED | OUTPATIENT
Start: 2018-06-26 | End: 2018-06-28 | Stop reason: HOSPADM

## 2018-06-26 RX ORDER — OXYCODONE HYDROCHLORIDE 5 MG/1
5 TABLET ORAL EVERY 6 HOURS PRN
Status: DISCONTINUED | OUTPATIENT
Start: 2018-06-26 | End: 2018-06-28 | Stop reason: HOSPADM

## 2018-06-26 RX ORDER — SODIUM CHLORIDE 9 MG/ML
100 INJECTION, SOLUTION INTRAVENOUS CONTINUOUS
Status: DISCONTINUED | OUTPATIENT
Start: 2018-06-26 | End: 2018-06-27

## 2018-06-26 RX ADMIN — ATORVASTATIN CALCIUM 40 MG: 40 TABLET, FILM COATED ORAL at 18:09

## 2018-06-26 RX ADMIN — Medication 100 MG: at 11:14

## 2018-06-26 RX ADMIN — HYDROMORPHONE HYDROCHLORIDE 0.5 MG: 1 INJECTION, SOLUTION INTRAMUSCULAR; INTRAVENOUS; SUBCUTANEOUS at 18:50

## 2018-06-26 RX ADMIN — HYDROMORPHONE HYDROCHLORIDE 0.5 MG: 1 INJECTION, SOLUTION INTRAMUSCULAR; INTRAVENOUS; SUBCUTANEOUS at 14:24

## 2018-06-26 RX ADMIN — Medication 30000 UNITS: at 11:16

## 2018-06-26 RX ADMIN — ALBUTEROL SULFATE 2.5 MG: 2.5 SOLUTION RESPIRATORY (INHALATION) at 14:10

## 2018-06-26 RX ADMIN — BUDESONIDE AND FORMOTEROL FUMARATE DIHYDRATE 2 PUFF: 160; 4.5 AEROSOL RESPIRATORY (INHALATION) at 11:17

## 2018-06-26 RX ADMIN — HEPARIN SODIUM 5000 UNITS: 5000 INJECTION, SOLUTION INTRAVENOUS; SUBCUTANEOUS at 13:19

## 2018-06-26 RX ADMIN — Medication 400 MG: at 11:14

## 2018-06-26 RX ADMIN — TRAMADOL HYDROCHLORIDE 50 MG: 50 TABLET, FILM COATED ORAL at 18:10

## 2018-06-26 RX ADMIN — Medication 3.38 G: at 06:26

## 2018-06-26 RX ADMIN — HYDROMORPHONE HYDROCHLORIDE 0.5 MG: 1 INJECTION, SOLUTION INTRAMUSCULAR; INTRAVENOUS; SUBCUTANEOUS at 00:25

## 2018-06-26 RX ADMIN — OXYCODONE HYDROCHLORIDE 5 MG: 5 TABLET ORAL at 16:07

## 2018-06-26 RX ADMIN — HEPARIN SODIUM 5000 UNITS: 5000 INJECTION, SOLUTION INTRAVENOUS; SUBCUTANEOUS at 22:14

## 2018-06-26 RX ADMIN — NICOTINE 1 PATCH: 14 PATCH, EXTENDED RELEASE TRANSDERMAL at 11:12

## 2018-06-26 RX ADMIN — ACETAMINOPHEN 325 MG: 325 TABLET ORAL at 06:25

## 2018-06-26 RX ADMIN — Medication 3.38 G: at 18:55

## 2018-06-26 RX ADMIN — ACETAMINOPHEN 325 MG: 325 TABLET ORAL at 22:12

## 2018-06-26 RX ADMIN — HYDROMORPHONE HYDROCHLORIDE 0.5 MG: 1 INJECTION, SOLUTION INTRAMUSCULAR; INTRAVENOUS; SUBCUTANEOUS at 06:36

## 2018-06-26 RX ADMIN — OXYCODONE HYDROCHLORIDE 10 MG: 10 TABLET ORAL at 22:12

## 2018-06-26 RX ADMIN — TRAMADOL HYDROCHLORIDE 50 MG: 50 TABLET, FILM COATED ORAL at 06:26

## 2018-06-26 RX ADMIN — PSYLLIUM HUSK 1 PACKET: 3.4 POWDER ORAL at 18:10

## 2018-06-26 RX ADMIN — HYDROMORPHONE HYDROCHLORIDE 0.5 MG: 1 INJECTION, SOLUTION INTRAMUSCULAR; INTRAVENOUS; SUBCUTANEOUS at 11:19

## 2018-06-26 RX ADMIN — ACETAMINOPHEN 325 MG: 325 TABLET ORAL at 13:27

## 2018-06-26 RX ADMIN — Medication 3.38 G: at 00:20

## 2018-06-26 RX ADMIN — FOLIC ACID 1 MG: 1 TABLET ORAL at 11:14

## 2018-06-26 RX ADMIN — Medication 3.38 G: at 13:15

## 2018-06-26 RX ADMIN — AMIODARONE HYDROCHLORIDE 200 MG: 200 TABLET ORAL at 11:14

## 2018-06-26 RX ADMIN — Medication 400 MG: at 18:09

## 2018-06-26 RX ADMIN — ALBUTEROL SULFATE 2.5 MG: 2.5 SOLUTION RESPIRATORY (INHALATION) at 08:09

## 2018-06-26 RX ADMIN — PSYLLIUM HUSK 1 PACKET: 3.4 POWDER ORAL at 22:13

## 2018-06-26 RX ADMIN — TIOTROPIUM BROMIDE 18 MCG: 18 CAPSULE ORAL; RESPIRATORY (INHALATION) at 16:08

## 2018-06-26 RX ADMIN — HEPARIN SODIUM 5000 UNITS: 5000 INJECTION, SOLUTION INTRAVENOUS; SUBCUTANEOUS at 06:25

## 2018-06-26 RX ADMIN — ASPIRIN 81 MG 81 MG: 81 TABLET ORAL at 11:14

## 2018-06-26 RX ADMIN — BUDESONIDE AND FORMOTEROL FUMARATE DIHYDRATE 2 PUFF: 160; 4.5 AEROSOL RESPIRATORY (INHALATION) at 18:11

## 2018-06-26 RX ADMIN — HYDROMORPHONE HYDROCHLORIDE 0.5 MG: 1 INJECTION, SOLUTION INTRAMUSCULAR; INTRAVENOUS; SUBCUTANEOUS at 23:17

## 2018-06-26 NOTE — PHYSICAL THERAPY NOTE
PT Cancellation Note:  Pt refused PT stating he already amb with restorative and is "done for today"    Felipe Sanchez, PT

## 2018-06-26 NOTE — OCCUPATIONAL THERAPY NOTE
Occupational Therapy Cancellation Note  OT orders received  Chart reviewed  Attempted to see pt for OT treatment this PM however pt refusing stating he already was up w/ restorative and nursing staff  OT will continue to follow and treat as able    Bari Goodell, MS, OTR/L

## 2018-06-26 NOTE — PROGRESS NOTES
Progress Note - Colorectal Surgery   Yung Carlson 66 y o  male MRN: [id number]  Unit/Bed#: [facility] 611-01 Encounter: [id number]    Assessment:  66 M with high output ileostomy and abdominal wound  -Found to have a small anastomotic leak on CT 6/20  -6/24 CTAP scan shows stable collections, mostly likely abscesses, and stable dot of free air possible developing fistula tract      Plan:  IR did not drain collection-risk not worth the benefit since adherent or intraluminal collection  Wet to dry dressing abdominal wound daily  Continue metamucil as tolerated  Prn pain control  On Zosyn day 7/10  SQH/SCDs  PT/OT-->STR  CM for placement-referral placed      Subjective/Objective     Subjective: SUZAN  Pain controlled, just gets mild intermittent abdominal pains  Wants to go home  Objective:    Blood pressure 105/71, pulse 81, temperature 97 8 °F (36 6 °C), temperature source Oral, resp  rate 20, height 5' 7" (1 702 m), weight 72 5 kg (159 lb 13 3 oz), SpO2 93 %  ,Body mass index is 25 03 kg/m²        Intake/Output Summary (Last 24 hours) at 06/26/18 0515  Last data filed at 06/26/18 0300   Gross per 24 hour   Intake              190 ml   Output             1875 ml   Net            -1685 ml       Invasive Devices     Peripheral Intravenous Line            Peripheral IV 06/24/18 Right;Ventral (anterior) Forearm 1 day          Drain            Colostomy  RUQ 34 days                Physical Exam:  NAD  AAOx3  Normal respiratory effort  soft, NT, ND  Ileostomy pink and functioning  Midline wound c/d/i  No c/c/e      Results from last 7 days  Lab Units 06/25/18  0724 06/24/18  0552 06/23/18  0607   WBC Thousand/uL 19 11* 18 44* 17 77*   HEMOGLOBIN g/dL 8 0* 9 2* 8 7*   HEMATOCRIT % 26 7* 31 5* 29 0*   PLATELETS Thousands/uL 404* 357 352       Results from last 7 days  Lab Units 06/25/18  0724 06/24/18  0552 06/23/18  0607   SODIUM mmol/L 138 139 139   POTASSIUM mmol/L 4 0 4 9 4 1   CHLORIDE mmol/L 102 105 102   CO2 mmol/L 28 19* 31   BUN mg/dL 7 7 8   CREATININE mg/dL 0 70 0 85 0 74   GLUCOSE RANDOM mg/dL 75 70 73   CALCIUM mg/dL 8 4 8 3 7 9*       Results from last 7 days  Lab Units 06/25/18  0723   INR  1 07

## 2018-06-26 NOTE — RESTORATIVE TECHNICIAN NOTE
Restorative Specialist Mobility Note       Activity: Ambulate in gleason, Ambulate in room, Bathroom privileges, Dangle, Stand at bedside (Educated/encouraged pt to ambulate with assistance 3-4 x's/day  Bed alarm on   Pt callbell, phone/tray within reach )     Assistive Device: Front wheel walker          [employer] BS, Restorative Technician, [employer]

## 2018-06-27 ENCOUNTER — APPOINTMENT (INPATIENT)
Dept: RADIOLOGY | Facility: HOSPITAL | Age: 58
DRG: 720 | End: 2018-06-27
Payer: COMMERCIAL

## 2018-06-27 LAB
ANION GAP SERPL CALCULATED.3IONS-SCNC: 7 MMOL/L (ref 4–13)
ANISOCYTOSIS BLD QL SMEAR: PRESENT
BASOPHILS # BLD MANUAL: 0 THOUSAND/UL (ref 0–0.1)
BASOPHILS NFR MAR MANUAL: 0 % (ref 0–1)
BUN SERPL-MCNC: 11 MG/DL (ref 5–25)
CALCIUM SERPL-MCNC: 8.6 MG/DL (ref 8.3–10.1)
CHLORIDE SERPL-SCNC: 103 MMOL/L (ref 100–108)
CO2 SERPL-SCNC: 29 MMOL/L (ref 21–32)
CREAT SERPL-MCNC: 0.88 MG/DL (ref 0.6–1.3)
EOSINOPHIL # BLD MANUAL: 0 THOUSAND/UL (ref 0–0.4)
EOSINOPHIL NFR BLD MANUAL: 0 % (ref 0–6)
ERYTHROCYTE [DISTWIDTH] IN BLOOD BY AUTOMATED COUNT: 16.8 % (ref 11.6–15.1)
GFR SERPL CREATININE-BSD FRML MDRD: 96 ML/MIN/1.73SQ M
GLUCOSE SERPL-MCNC: 82 MG/DL (ref 65–140)
HCT VFR BLD AUTO: 31.7 % (ref 36.5–49.3)
HGB BLD-MCNC: 9.4 G/DL (ref 12–17)
HYPERCHROMIA BLD QL SMEAR: PRESENT
LYMPHOCYTES # BLD AUTO: 1.66 THOUSAND/UL (ref 0.6–4.47)
LYMPHOCYTES # BLD AUTO: 9 % (ref 14–44)
MCH RBC QN AUTO: 29.7 PG (ref 26.8–34.3)
MCHC RBC AUTO-ENTMCNC: 29.7 G/DL (ref 31.4–37.4)
MCV RBC AUTO: 100 FL (ref 82–98)
METAMYELOCYTES NFR BLD MANUAL: 1 % (ref 0–1)
MONOCYTES # BLD AUTO: 0.74 THOUSAND/UL (ref 0–1.22)
MONOCYTES NFR BLD: 4 % (ref 4–12)
MYELOCYTES NFR BLD MANUAL: 1 % (ref 0–1)
NEUTROPHILS # BLD MANUAL: 15.71 THOUSAND/UL (ref 1.85–7.62)
NEUTS SEG NFR BLD AUTO: 85 % (ref 43–75)
NRBC BLD AUTO-RTO: 0 /100 WBCS
PLATELET # BLD AUTO: 489 THOUSANDS/UL (ref 149–390)
PLATELET BLD QL SMEAR: ABNORMAL
PMV BLD AUTO: 9.7 FL (ref 8.9–12.7)
POLYCHROMASIA BLD QL SMEAR: PRESENT
POTASSIUM SERPL-SCNC: 4 MMOL/L (ref 3.5–5.3)
RBC # BLD AUTO: 3.17 MILLION/UL (ref 3.88–5.62)
RBC MORPH BLD: PRESENT
SODIUM SERPL-SCNC: 139 MMOL/L (ref 136–145)
WBC # BLD AUTO: 18.48 THOUSAND/UL (ref 4.31–10.16)

## 2018-06-27 PROCEDURE — 99152 MOD SED SAME PHYS/QHP 5/>YRS: CPT

## 2018-06-27 PROCEDURE — 49406 IMAGE CATH FLUID PERI/RETRO: CPT

## 2018-06-27 PROCEDURE — 87070 CULTURE OTHR SPECIMN AEROBIC: CPT | Performed by: SURGERY

## 2018-06-27 PROCEDURE — 85007 BL SMEAR W/DIFF WBC COUNT: CPT | Performed by: PHYSICIAN ASSISTANT

## 2018-06-27 PROCEDURE — 94760 N-INVAS EAR/PLS OXIMETRY 1: CPT

## 2018-06-27 PROCEDURE — 80048 BASIC METABOLIC PNL TOTAL CA: CPT | Performed by: PHYSICIAN ASSISTANT

## 2018-06-27 PROCEDURE — 99152 MOD SED SAME PHYS/QHP 5/>YRS: CPT | Performed by: RADIOLOGY

## 2018-06-27 PROCEDURE — 97116 GAIT TRAINING THERAPY: CPT

## 2018-06-27 PROCEDURE — 0W9G30Z DRAINAGE OF PERITONEAL CAVITY WITH DRAINAGE DEVICE, PERCUTANEOUS APPROACH: ICD-10-PCS | Performed by: RADIOLOGY

## 2018-06-27 PROCEDURE — 49406 IMAGE CATH FLUID PERI/RETRO: CPT | Performed by: RADIOLOGY

## 2018-06-27 PROCEDURE — 94640 AIRWAY INHALATION TREATMENT: CPT

## 2018-06-27 PROCEDURE — 87205 SMEAR GRAM STAIN: CPT | Performed by: SURGERY

## 2018-06-27 PROCEDURE — 97535 SELF CARE MNGMENT TRAINING: CPT

## 2018-06-27 PROCEDURE — 85027 COMPLETE CBC AUTOMATED: CPT | Performed by: PHYSICIAN ASSISTANT

## 2018-06-27 PROCEDURE — 97530 THERAPEUTIC ACTIVITIES: CPT

## 2018-06-27 RX ORDER — FENTANYL CITRATE 50 UG/ML
INJECTION, SOLUTION INTRAMUSCULAR; INTRAVENOUS CODE/TRAUMA/SEDATION MEDICATION
Status: COMPLETED | OUTPATIENT
Start: 2018-06-27 | End: 2018-06-27

## 2018-06-27 RX ORDER — MIDAZOLAM HYDROCHLORIDE 1 MG/ML
INJECTION INTRAMUSCULAR; INTRAVENOUS CODE/TRAUMA/SEDATION MEDICATION
Status: COMPLETED | OUTPATIENT
Start: 2018-06-27 | End: 2018-06-27

## 2018-06-27 RX ADMIN — HYDROMORPHONE HYDROCHLORIDE 0.5 MG: 1 INJECTION, SOLUTION INTRAMUSCULAR; INTRAVENOUS; SUBCUTANEOUS at 12:03

## 2018-06-27 RX ADMIN — HEPARIN SODIUM 5000 UNITS: 5000 INJECTION, SOLUTION INTRAVENOUS; SUBCUTANEOUS at 21:49

## 2018-06-27 RX ADMIN — PSYLLIUM HUSK 1 PACKET: 3.4 POWDER ORAL at 21:49

## 2018-06-27 RX ADMIN — Medication 30000 UNITS: at 08:44

## 2018-06-27 RX ADMIN — OXYCODONE HYDROCHLORIDE 10 MG: 10 TABLET ORAL at 13:51

## 2018-06-27 RX ADMIN — SODIUM CHLORIDE 100 ML/HR: 0.9 INJECTION, SOLUTION INTRAVENOUS at 09:06

## 2018-06-27 RX ADMIN — Medication 400 MG: at 18:31

## 2018-06-27 RX ADMIN — FENTANYL CITRATE 50 MCG: 50 INJECTION, SOLUTION INTRAMUSCULAR; INTRAVENOUS at 17:19

## 2018-06-27 RX ADMIN — TIOTROPIUM BROMIDE 18 MCG: 18 CAPSULE ORAL; RESPIRATORY (INHALATION) at 08:44

## 2018-06-27 RX ADMIN — TRAMADOL HYDROCHLORIDE 50 MG: 50 TABLET, FILM COATED ORAL at 05:39

## 2018-06-27 RX ADMIN — HEPARIN SODIUM 5000 UNITS: 5000 INJECTION, SOLUTION INTRAVENOUS; SUBCUTANEOUS at 13:47

## 2018-06-27 RX ADMIN — Medication 3.38 G: at 18:29

## 2018-06-27 RX ADMIN — TRAMADOL HYDROCHLORIDE 50 MG: 50 TABLET, FILM COATED ORAL at 18:31

## 2018-06-27 RX ADMIN — ACETAMINOPHEN 325 MG: 325 TABLET ORAL at 05:38

## 2018-06-27 RX ADMIN — ASPIRIN 81 MG 81 MG: 81 TABLET ORAL at 08:43

## 2018-06-27 RX ADMIN — Medication 3.38 G: at 12:03

## 2018-06-27 RX ADMIN — ACETAMINOPHEN 325 MG: 325 TABLET ORAL at 21:49

## 2018-06-27 RX ADMIN — FOLIC ACID 1 MG: 1 TABLET ORAL at 08:43

## 2018-06-27 RX ADMIN — BUDESONIDE AND FORMOTEROL FUMARATE DIHYDRATE 2 PUFF: 160; 4.5 AEROSOL RESPIRATORY (INHALATION) at 08:44

## 2018-06-27 RX ADMIN — PSYLLIUM HUSK 1 PACKET: 3.4 POWDER ORAL at 18:31

## 2018-06-27 RX ADMIN — ATORVASTATIN CALCIUM 40 MG: 40 TABLET, FILM COATED ORAL at 18:31

## 2018-06-27 RX ADMIN — AMIODARONE HYDROCHLORIDE 200 MG: 200 TABLET ORAL at 08:43

## 2018-06-27 RX ADMIN — NICOTINE 1 PATCH: 14 PATCH, EXTENDED RELEASE TRANSDERMAL at 08:43

## 2018-06-27 RX ADMIN — MIDAZOLAM 1 MG: 1 INJECTION INTRAMUSCULAR; INTRAVENOUS at 17:13

## 2018-06-27 RX ADMIN — FENTANYL CITRATE 50 MCG: 50 INJECTION, SOLUTION INTRAMUSCULAR; INTRAVENOUS at 17:14

## 2018-06-27 RX ADMIN — Medication 400 MG: at 08:43

## 2018-06-27 RX ADMIN — Medication 3.38 G: at 00:02

## 2018-06-27 RX ADMIN — OXYCODONE HYDROCHLORIDE 10 MG: 10 TABLET ORAL at 08:47

## 2018-06-27 RX ADMIN — HYDROMORPHONE HYDROCHLORIDE 0.5 MG: 1 INJECTION, SOLUTION INTRAMUSCULAR; INTRAVENOUS; SUBCUTANEOUS at 22:06

## 2018-06-27 RX ADMIN — SODIUM CHLORIDE 100 ML/HR: 0.9 INJECTION, SOLUTION INTRAVENOUS at 00:02

## 2018-06-27 RX ADMIN — BUDESONIDE AND FORMOTEROL FUMARATE DIHYDRATE 2 PUFF: 160; 4.5 AEROSOL RESPIRATORY (INHALATION) at 18:32

## 2018-06-27 RX ADMIN — MIDAZOLAM 1 MG: 1 INJECTION INTRAMUSCULAR; INTRAVENOUS at 17:19

## 2018-06-27 RX ADMIN — HYDROMORPHONE HYDROCHLORIDE 0.5 MG: 1 INJECTION, SOLUTION INTRAMUSCULAR; INTRAVENOUS; SUBCUTANEOUS at 05:50

## 2018-06-27 RX ADMIN — ALBUTEROL SULFATE 2.5 MG: 2.5 SOLUTION RESPIRATORY (INHALATION) at 19:36

## 2018-06-27 RX ADMIN — HYDROMORPHONE HYDROCHLORIDE 0.5 MG: 1 INJECTION, SOLUTION INTRAMUSCULAR; INTRAVENOUS; SUBCUTANEOUS at 16:00

## 2018-06-27 RX ADMIN — Medication 3.38 G: at 05:39

## 2018-06-27 RX ADMIN — Medication 100 MG: at 08:43

## 2018-06-27 RX ADMIN — ACETAMINOPHEN 325 MG: 325 TABLET ORAL at 13:47

## 2018-06-27 RX ADMIN — HEPARIN SODIUM 5000 UNITS: 5000 INJECTION, SOLUTION INTRAVENOUS; SUBCUTANEOUS at 05:39

## 2018-06-27 NOTE — PLAN OF CARE
Problem: PHYSICAL THERAPY ADULT  Goal: Performs mobility at highest level of function for planned discharge setting  See evaluation for individualized goals  Treatment/Interventions: Functional transfer training, LE strengthening/ROM, Therapeutic exercise, Endurance training, Patient/family training, Equipment eval/education, Bed mobility, Gait training  Equipment Recommended: Marcela Montalvo       See flowsheet documentation for full assessment, interventions and recommendations  Outcome: Progressing  Prognosis: Fair  Problem List: Decreased strength, Decreased endurance, Impaired balance, Decreased mobility, Decreased coordination, Decreased cognition, Decreased safety awareness, Pain, Decreased skin integrity  Assessment: Pt  Was educated in transfer and gait technique and sequencing for improved independence with functional mobility  Education was provided for hand placement, sequencing, RW management, and posture with good carryover of verbal cues noted throughout session  Pt  Ambulated 30'x2 with Sheng x1 and SBA of 2nd for IV management  TUG scores noted above indicate a high fall risk with noted gait degradation during turns  Pt  Will benefit from continued skilled therapy to address deficits in gait, balance, and transfers noted above for increased independence with functional mobility  Discharge recommendation is Home PT to assess patients ability to perform functional tasks throughout home and provide interventions for safe home mobility  Barriers to Discharge: Decreased caregiver support     Recommendation: Home PT, Home with family support     PT - OK to Discharge:  (to rehab )    See flowsheet documentation for full assessment     Yoselin Reyes, SPT

## 2018-06-27 NOTE — PLAN OF CARE
Problem: OCCUPATIONAL THERAPY ADULT  Goal: Performs self-care activities at highest level of function for planned discharge setting  See evaluation for individualized goals  Treatment Interventions: ADL retraining, Functional transfer training, UE strengthening/ROM, Endurance training, Cognitive reorientation, Patient/family training, Equipment evaluation/education, Compensatory technique education, Continued evaluation, Energy conservation, Activityengagement          See flowsheet documentation for full assessment, interventions and recommendations  Outcome: Progressing  Limitation: Decreased ADL status, Decreased UE strength, Decreased Safe judgement during ADL, Decreased endurance, Decreased self-care trans, Decreased high-level ADLs  Prognosis: Fair  Assessment: Pt participated in skilled OT session this date focusing on functional mobility, LB dressing, toileting  Pt agreeable to participate in OT tx session  Upon arrival, pt supine in bed  Following tx session, pt supine in bed, all needs within reach  Pt requiring SBA for bed mobility, Min A to manage lines during ambulation w/ RW  Pt demonstrating LB dressing w/ SBA and toileting w/ urinal w/ SBA  In comparison to previous sessions, pt w/ improvements in activity participation, ADL independence, functional mobility  Pt continues to be functioning below baseline level 2* generalized weakness, decreased mobility status, decreased activity tolerance  From OT standpoint, anticipate d/c to home w/ family support  Continue to follow pt in hospital for OT treatments to address performance deficits, maximize safety and independence w/ ADLs and functional mobility, and facilitate transition to prior level of functioning       OT Discharge Recommendation: Home with family support  OT - OK to Discharge: Yes (when medically ready)

## 2018-06-27 NOTE — PHYSICAL THERAPY NOTE
PHYSICAL THERAPY Treatment NOTE  Patient Name: Stuart Blandon  Today's Date: 6/27/2018 06/27/18 1100   Pain Assessment   Pain Score 8   Pain Location Abdomen   Pain Orientation Right; Lower   Hospital Pain Intervention(s) Repositioned; Ambulation/increased activity; Emotional support   Restrictions/Precautions   Other Precautions Fall Risk;Pain;Cognitive; Chair Alarm; Bed Alarm; Impulsive   General   Chart Reviewed Yes   Additional Pertinent History Pt  is a 67 yo M presenting with ETOH abuse  Pt  seen for Tx and update of therapy goals  Family/Caregiver Present No   Cognition   Overall Cognitive Status Impaired   Arousal/Participation Cooperative  (with persistent positive reinforcement)   Attention Attends with cues to redirect   Orientation Level Oriented X4   Memory Decreased recall of precautions   Following Commands Follows multistep commands with increased time or repetition   Comments Pt  was identified by full name and birthdate   Subjective   Subjective Pt  agreeable to PT session   Transfers   Sit to Stand 4  Minimal assistance   Additional items Assist x 1; Impulsive;Verbal cues  (for hand placement and body position)   Stand to Sit 4  Minimal assistance   Additional items Assist x 1;Verbal cues; Impulsive  (to reach back for controlled descent)   Additional Comments Pt  performed transfers with Minimal assistance requiring verbal cues for sequencing and hand placement  Ambulation/Elevation   Gait pattern Improper Weight shift;Narrow HANNAH; Forward Flexion;Decreased foot clearance;Shuffling; Short stride   Gait Assistance 4  Minimal assist   Additional items Assist x 1;Verbal cues  (for RW management, posture and gait sequencing)   Assistive Device Rolling walker   Distance Pt  ambulated 30'x2 with RW and Sheng demonstrating need for verbal cues for upright posture, RW managmement, and visual scanning for safe course navigation  Pt  performed TUG exam to assess fall risk  Pt  performed TUG in 47 seconds, and 25 seconds demonstrating a high fall risk  Balance   Static Sitting Fair +   Dynamic Sitting Fair +   Static Standing Fair -   Dynamic Standing Poor +   Ambulatory Poor +   Endurance Deficit   Endurance Deficit Yes   Endurance Deficit Description gait and posture degradation noted with fatigue   Activity Tolerance   Activity Tolerance Patient limited by fatigue;Patient limited by pain   Medical Staff Made Aware Spoke to CM, and Nelson Lemus, PT   Nurse Made Aware Spoke to NSG prior to session  Assessment   Prognosis Fair   Problem List Decreased strength;Decreased endurance; Impaired balance;Decreased mobility; Decreased coordination;Decreased cognition;Decreased safety awareness;Pain;Decreased skin integrity   Assessment Pt  Was educated in transfer and gait technique and sequencing for improved independence with functional mobility  Education was provided for hand placement, sequencing, RW management, and posture with good carryover of verbal cues noted throughout session  Pt  Ambulated 30'x2 with Sheng x1 and SBA of 2nd for IV management  TUG scores noted above indicate a high fall risk with noted gait degradation during turns  Pt  Will benefit from continued skilled therapy to address deficits in gait, balance, and transfers noted above for increased independence with functional mobility  Discharge recommendation is Home PT to assess patients ability to perform functional tasks throughout home and provide interventions for safe home mobility      Goals   Patient Goals To go home   STG Expiration Date 07/07/18   Short Term Goal #1 Pt will be able to: (1) perform bed mobility with Independent (2) perform sit to stand with Independent (3) ambulate at least 300` with Supervision and least restrictive AD (4) increase standing balance by 1 grade (5) review HEP    Treatment Day 1   Plan   Treatment/Interventions Functional transfer training;LE strengthening/ROM; Therapeutic exercise; Endurance training;Cognitive reorientation;Patient/family training;Equipment eval/education;Gait training;Spoke to nursing;Spoke to case management   Progress Progressing toward goals   PT Frequency Other (Comment)  (3-5x/week )     Miley Le, SPT 6/27/2018

## 2018-06-27 NOTE — CASE MANAGEMENT
Continued Stay Review    Thank you,  [address] [facility] in the [facility] by Amrit Vallejo for 2017  Network Utilization Review Department  Phone: [phone]; Fax [phone]  ATTENTION: The Network Utilization Review Department is now centralized for our 7 Facilities  Make a note that we have a new phone and fax numbers for our Department  Please call with any questions or concerns to [phone] and carefully follow the prompts so that you are directed to the right person  All voicemails are confidential  Fax any determinations, approvals, denials, and requests for initial or continue stay review clinical to [phone]  Due to HIGH CALL volume, it would be easier if you could please send faxed requests to expedite your requests and in part, help us provide discharge notifications faster      Date: 6/27/2018    Vital Signs: /68 (BP Location: Left arm)   Pulse 82   Temp 98 6 °F (37 °C) (Oral)   Resp 18   Ht 5' 7" (1 702 m)   Wt 72 5 kg (159 lb 13 3 oz)   SpO2 96%   BMI 25 03 kg/m²     Medications:   Scheduled Meds:   Current Facility-Administered Medications:  acetaminophen 325 mg Oral Q8H [facility]    acetaminophen 650 mg Oral Q6H PRN    albuterol 2 puff Inhalation Q4H PRN    albuterol 2 5 mg Nebulization Q4H PRN 6/25 x 1  6/26 x 2    amiodarone 200 mg Oral Daily    aspirin 81 mg Oral Daily    atorvastatin 40 mg Oral Daily With Dinner    barium sulfate 900 mL Oral Once in imaging    budesonide-formoterol 2 puff Inhalation BID    folic acid 1 mg Oral Daily    heparin (porcine) 5,000 Units Subcutaneous Q8H [facility]    HYDROmorphone 0 5 mg Intravenous Q4H PRN 6/25 x 5  6/26 x 6  6/27 x 1    LORazepam 1 mg Oral Q8H PRN    magnesium oxide 400 mg Oral BID    nicotine 1 patch Transdermal Daily    ondansetron 4 mg Intravenous Q6H PRN    oxyCODONE 10 mg Oral Q4H PRN 6/26 x 1   6/27 x 1    oxyCODONE 5 mg Oral Q6H PRN 6/25 x 1  6/26 x 1    piperacillin-tazobactam 3 375 g Intravenous Q6H    pneumococcal 23-valent polysaccharide vaccine 0 5 mL Subcutaneous Prior to discharge    psyllium 1 packet Oral 4x Daily    thiamine 100 mg Oral Daily    tiotropium 18 mcg Inhalation Daily    traMADol 50 mg Oral Q12H    vitamin A 30,000 Units Oral Daily      Continuous Infusions:   sodium chloride 100 mL/hr Last Rate: 100 mL/hr (06/27/18 0906)     Nursing orders -     Abnormal Labs/Diagnostic Results:      Ref Range & Units 6/27/18 6023 6/26/18 0736 6/25/18 0724 6/24/18 0552 6/23/18 0607   WBC 4 31 - 10 16 Thousand/uL 18 48   22 32   19 11   18 44   17 77     RBC 3 88 - 5 62 Million/uL 3 17   3 18   2 70   3 08   2 94     Hemoglobin 12 0 - 17 0 g/dL 9 4   9 6   8 0   9 2   8 7     Hematocrit 36 5 - 49 3 % 31 7   32 6   26 7   31 5   29 0     MCV 82 - 98 fL 100   103   99   102   99     MCH 26 8 - 34 3 pg 29 7  30 2  29 6  29 9  29 6    MCHC 31 4 - 37 4 g/dL 29 7   29 4   30 0   29 2   30 0     RDW 11 6 - 15 1 % 16 8   16 7   16 6   16 8   16 3     MPV 8 9 - 12 7 fL 9 7  10 0  9 7  9 6  9 6    Platelets 417 - 442 Thousands/uL 489   387  404   357  352    nRBC /100 WBCs 0  0CM  0CM  0CM  0CM               Ref Range & Units 6/27/18 0528 6/25/18 0724 6/24/18 0552 6/23/18 0607 6/22/18 0532   Sodium 136 - 145 mmol/L 139  138  139  139  141    Potassium 3 5 - 5 3 mmol/L 4 0  4 0  4 9CM  4 1  4 2    Chloride 100 - 108 mmol/L 103  102  105  102  102    CO2 21 - 32 mmol/L 29  28  19   31  29    Anion Gap 4 - 13 mmol/L 7  8  15   6  10    BUN 5 - 25 mg/dL 11  7  7  8  9    Creatinine 0 60 - 1 30 mg/dL 0 88  0 70CM  0 85CM  0 74CM  0 80CM    Glucose 65 - 140 mg/dL 82  75CM  70CM  73CM  78CM    Calcium 8 3 - 10 1 mg/dL 8 6  8 4  8 3  7 9   7 8     eGFR ml/min/1 73sq m 96  105  97  103  100                Age/Sex: 66 y o  male     Assessment/Plan:   Progress note 6/27  Assessment:  66 M with high output ileostomy and abdominal wound  -Found to have an anastomotic leak on CT 6/20  -6/24 CTAP scan shows stable collections, mostly likely abscesses, and stable dot of free air possible developing fistula tract     Plan:  1  NPO for IR today for drainage intra abdominal fluid collection  2  NSS @ 100 ml/hr while NPO  3  Follow WBC this am  4  Continue to work with PT/OT if wants to go home w/ VNA  5   Monitor ileostomy output    Discharge Plan:  TBD

## 2018-06-27 NOTE — PROGRESS NOTES
Progress Note - Colorectal   Claven Pacini 66 y o  male MRN: [id number]  Unit/Bed#: [facility] 611-01 Encounter: [id number]      Objective: Talkative this am, annoyed about being NPO for the last few days and no interventions performed  IR is to tap or drain the intra abdominal fluid collection today  Patient anxious to leave  Refuses [facility] where his Mother   Would prefer to go home with VNA when discharged  Patient continues on Zosyn  WBC 22K yesterday  Ileostomy output staying around 1500 ml/24 hours  1025 UA  1200 ileostomy    Blood pressure 112/68, pulse 80, temperature 97 8 °F (36 6 °C), temperature source Oral, resp  rate 20, height 5' 7" (1 702 m), weight 72 5 kg (159 lb 13 3 oz), SpO2 97 %  ,Body mass index is 25 03 kg/m²  Intake/Output Summary (Last 24 hours) at 18 0535  Last data filed at 18 0301   Gross per 24 hour   Intake          2518 33 ml   Output             2675 ml   Net          -156 67 ml       Invasive Devices     Peripheral Intravenous Line            Peripheral IV 18 Right;Ventral (anterior) Forearm 2 days          Drain            Colostomy  RUQ 35 days                Physical Exam:   Abdomen: soft, non distended, midline clean and dry, upper incisional area with wet to dry dressing, no guarding, non tender  Extremities: no edema, no calf tenderness    Lab, Imaging and other studies:  pending  VTE Pharmacologic Prophylaxis: Heparin  VTE Mechanical Prophylaxis: sequential compression device    Assessment:  66 M with high output ileostomy and abdominal wound  -Found to have a small anastomotic leak on CT   - CTAP scan shows stable collections, mostly likely abscesses, and stable dot of free air possible developing fistula tract    Plan:  1  NPO for IR today for drainage intra abdominal fluid collection  2  NSS @ 100 ml/hr while NPO  3  Follow WBC this am  4  Continue to work with PT/OT if wants to go home w/ VNA  5   Monitor ileostomy output

## 2018-06-27 NOTE — RESTORATIVE TECHNICIAN NOTE
Restorative Specialist Mobility Note       Activity: Other (Comment) (Educated/encouraged pt to ambulate with assistance 3-4 x's/day, pt refused 2* pending a procedure soon   Pt callbell, phone/tray within reach )       Jose GALAN, Restorative Technician, [employer]

## 2018-06-27 NOTE — OCCUPATIONAL THERAPY NOTE
Occupational Therapy Treatment Note      Hyun Coronado    6/27/2018    Patient Active Problem List   Diagnosis    Colitis    Spinal stenosis of cervical region    Hypertension    Pericarditis    Coronary artery disease involving native coronary artery of native heart    Leukocytosis    Asthma    Atherosclerosis of coronary artery    COPD with exacerbation (HCC)    Dyslipidemia    Alcohol abuse    Tobacco abuse    [facility]    Acute respiratory insufficiency    Agitation    Heart failure, systolic, due to idiopathic cardiomyopathy ([facility] )    Ileostomy in place ([facility] )    Moderate protein-calorie malnutrition (HCC)    Anemia    Hypocalcemia    Perforation of colon ([facility] )       Past Medical History:   Diagnosis Date    Asthma     Cardiac disease     Colitis     Colon polyps     COPD (chronic obstructive pulmonary disease) ([facility]     Coronary artery disease     Diverticulitis     Hyperlipidemia     Hypertension     Myocardial infarction ([facility]     Perforation of colon ([facility] )     Ulcerative colitis ([facility] )        Past Surgical History:   Procedure Laterality Date    ANGIOPLASTY      COLON SURGERY      COLONOSCOPY N/A 10/24/2016    Procedure: COLONOSCOPY;  Surgeon: Pal Lamb MD;  [facility]: [facility]; Service:     CORONARY ANGIOPLASTY WITH STENT PLACEMENT      ESOPHAGOGASTRODUODENOSCOPY N/A 10/24/2016    Procedure: ESOPHAGOGASTRODUODENOSCOPY (EGD); Surgeon: Pal Lamb MD;  Location: [facility];   Service:     EXPLORATORY LAPAROTOMY W/ BOWEL RESECTION N/A 5/22/2018    Procedure: EXPLORATORY LAPAROTOMY, ILIOCOLECTOMY, ILIOCOLONIC ANASTAMOSIS, LOOP ILIOSTOMY, REPAIR OF SEROSAL TEAR, EXTENSIVE LYSIS OF ADHESIONS, WOUND VAC PLACEMENT;  Surgeon: Pal Lamb MD;  Location: [facility];  Service: Colorectal    CO COLONOSCOPY FLX DX W/COLLJ SPEC WHEN PFRMD N/A 2/6/2018    Procedure: COLONOSCOPY;  Surgeon: Pal Lamb MD;  [facility]: [facility];  Service: Colorectal    TONSILLECTOMY          06/27/18 1440   Restrictions/Precautions   Weight Bearing Precautions Per Order No   Other Precautions Fall Risk;Pain   Pain Assessment   Pain Assessment No/denies pain   Pain Score No Pain   ADL   Where Assessed Edge of bed   LB Dressing Assistance 5  Supervision/Setup   LB Dressing Deficit Don/doff R sock; Don/doff L sock   Toileting Assistance  5  Supervision/Setup   Toileting Deficit Setup;Use of bedpan/urinal setup   Transfers   Sit to Stand 5  Supervision   Additional items Assist x 1;Verbal cues   Stand to Sit 5  Supervision   Additional items Verbal cues   Functional Mobility   Functional Mobility 4  Minimal assistance   Additional items Rolling walker   Cognition   Overall Cognitive Status Impaired   Arousal/Participation Alert   Attention Within functional limits   Orientation Level Oriented X4   Memory Decreased recall of precautions   Following Commands Follows one step commands without difficulty   Comments pt reports he has assist at home as needed upon d/c; focused on leaving hospital soon   Activity Tolerance   Activity Tolerance Patient tolerated treatment well   Medical Staff Made Aware ok to see per RN, spoke w/ CM   Assessment   Assessment Pt participated in skilled OT session this date focusing on functional mobility, LB dressing, toileting  Pt agreeable to participate in OT tx session  Upon arrival, pt supine in bed  Following tx session, pt supine in bed, all needs within reach  Pt requiring SBA for bed mobility, Min A to manage lines during ambulation w/ RW  Pt demonstrating LB dressing w/ SBA and toileting w/ urinal w/ SBA  In comparison to previous sessions, pt w/ improvements in activity participation, ADL independence, functional mobility  Pt continues to be functioning below baseline level 2* generalized weakness, decreased mobility status, decreased activity tolerance  From OT standpoint, anticipate d/c to home w/ family support  Continue to follow pt in hospital for OT treatments to address performance deficits, maximize safety and independence w/ ADLs and functional mobility, and facilitate transition to prior level of functioning  Plan   Treatment Interventions ADL retraining;Functional transfer training; Endurance training;Cognitive reorientation;Patient/family training;Equipment evaluation/education; Compensatory technique education;Continued evaluation; Energy conservation; Activityengagement   Goal Expiration Date 07/07/18  (goals extended 6/27)   Treatment Day 3   OT Frequency 3-5x/wk   Recommendation   OT Discharge Recommendation Home with family support   OT - OK to Discharge Yes  (when medically ready)   Barthel Index   Feeding 10   Bathing 0   Grooming Score 5   Dressing Score 5   Bladder Score 10   Bowels Score 0  (colostomy)   Toilet Use Score 5   Transfers (Bed/Chair) Score 10   Mobility (Level Surface) Score 0   Stairs Score 0   Barthel Index Score 45   Modified Omar Scale   Modified Omar Scale 3   Documentation Completed by Rafia Peguero MS, OTR/L

## 2018-06-27 NOTE — SOCIAL WORK
CM spoke with patient to confirm d/c plan  Pt wants to go home with Home Care  Pt states he has a lot of family to help him out at home

## 2018-06-27 NOTE — BRIEF OP NOTE (RAD/CATH)
CT guided drainage Procedure Note  PATIENT NAME: Anirudh Chung  : 1960  MRN: [id number]     Pre-op Diagnosis:   1  Acute renal failure, unspecified acute renal failure type ([facility] )    2  Hypotension    3  Dehydration    4  Leukocytosis, unspecified type    5  Perforation of colon ([facility] )    6  Presence of ileostomy ([facility])      Post-op Diagnosis:   1  Acute renal failure, unspecified acute renal failure type ([facility] )    2  Hypotension    3  Dehydration    4  Leukocytosis, unspecified type    5  Perforation of colon ([facility] )    6  Presence of ileostomy [facility])        Surgeon:   Raina Lopez MD  Assistants:     No qualified resident was available, Resident is only observing    Estimated Blood Loss: none  Findings: 8 5 Fr drainage catheter placed into LUQ fluid collection adjacent to a loop of small bowel  Approximately 17 ml of purulent fluid was aspirated      Specimens: Fluid for culture    Complications:  none    Anesthesia: Conscious sedation and Local    Raina Lopez MD     Date: 2018  Time: 5:33 PM

## 2018-06-28 VITALS
RESPIRATION RATE: 20 BRPM | BODY MASS INDEX: 25.09 KG/M2 | SYSTOLIC BLOOD PRESSURE: 120 MMHG | TEMPERATURE: 97.9 F | OXYGEN SATURATION: 94 % | HEART RATE: 92 BPM | WEIGHT: 159.83 LBS | HEIGHT: 67 IN | DIASTOLIC BLOOD PRESSURE: 71 MMHG

## 2018-06-28 LAB
ANION GAP SERPL CALCULATED.3IONS-SCNC: 7 MMOL/L (ref 4–13)
BASOPHILS # BLD AUTO: 0.02 THOUSANDS/ΜL (ref 0–0.1)
BASOPHILS NFR BLD AUTO: 0 % (ref 0–1)
BUN SERPL-MCNC: 10 MG/DL (ref 5–25)
CALCIUM SERPL-MCNC: 8.4 MG/DL (ref 8.3–10.1)
CHLORIDE SERPL-SCNC: 106 MMOL/L (ref 100–108)
CO2 SERPL-SCNC: 27 MMOL/L (ref 21–32)
CREAT SERPL-MCNC: 0.72 MG/DL (ref 0.6–1.3)
EOSINOPHIL # BLD AUTO: 0.03 THOUSAND/ΜL (ref 0–0.61)
EOSINOPHIL NFR BLD AUTO: 0 % (ref 0–6)
ERYTHROCYTE [DISTWIDTH] IN BLOOD BY AUTOMATED COUNT: 17 % (ref 11.6–15.1)
GFR SERPL CREATININE-BSD FRML MDRD: 104 ML/MIN/1.73SQ M
GLUCOSE SERPL-MCNC: 80 MG/DL (ref 65–140)
HCT VFR BLD AUTO: 28.8 % (ref 36.5–49.3)
HGB BLD-MCNC: 8.5 G/DL (ref 12–17)
IMM GRANULOCYTES # BLD AUTO: 0.35 THOUSAND/UL (ref 0–0.2)
IMM GRANULOCYTES NFR BLD AUTO: 2 % (ref 0–2)
LYMPHOCYTES # BLD AUTO: 1.95 THOUSANDS/ΜL (ref 0.6–4.47)
LYMPHOCYTES NFR BLD AUTO: 11 % (ref 14–44)
MCH RBC QN AUTO: 29 PG (ref 26.8–34.3)
MCHC RBC AUTO-ENTMCNC: 29.5 G/DL (ref 31.4–37.4)
MCV RBC AUTO: 98 FL (ref 82–98)
MONOCYTES # BLD AUTO: 1.05 THOUSAND/ΜL (ref 0.17–1.22)
MONOCYTES NFR BLD AUTO: 6 % (ref 4–12)
NEUTROPHILS # BLD AUTO: 13.96 THOUSANDS/ΜL (ref 1.85–7.62)
NEUTS SEG NFR BLD AUTO: 81 % (ref 43–75)
NRBC BLD AUTO-RTO: 0 /100 WBCS
PLATELET # BLD AUTO: 454 THOUSANDS/UL (ref 149–390)
PMV BLD AUTO: 9.5 FL (ref 8.9–12.7)
POTASSIUM SERPL-SCNC: 3.8 MMOL/L (ref 3.5–5.3)
RBC # BLD AUTO: 2.93 MILLION/UL (ref 3.88–5.62)
SODIUM SERPL-SCNC: 140 MMOL/L (ref 136–145)
WBC # BLD AUTO: 17.36 THOUSAND/UL (ref 4.31–10.16)

## 2018-06-28 PROCEDURE — 94640 AIRWAY INHALATION TREATMENT: CPT

## 2018-06-28 PROCEDURE — 85025 COMPLETE CBC W/AUTO DIFF WBC: CPT | Performed by: STUDENT IN AN ORGANIZED HEALTH CARE EDUCATION/TRAINING PROGRAM

## 2018-06-28 PROCEDURE — 94760 N-INVAS EAR/PLS OXIMETRY 1: CPT

## 2018-06-28 PROCEDURE — 80048 BASIC METABOLIC PNL TOTAL CA: CPT | Performed by: STUDENT IN AN ORGANIZED HEALTH CARE EDUCATION/TRAINING PROGRAM

## 2018-06-28 RX ORDER — METRONIDAZOLE 500 MG/1
500 TABLET ORAL EVERY 8 HOURS SCHEDULED
Qty: 12 TABLET | Refills: 0
Start: 2018-06-28 | End: 2018-07-02

## 2018-06-28 RX ORDER — CEPHALEXIN 500 MG/1
500 CAPSULE ORAL EVERY 8 HOURS SCHEDULED
Qty: 16 CAPSULE | Refills: 0 | Status: SHIPPED | OUTPATIENT
Start: 2018-06-28 | End: 2018-07-04

## 2018-06-28 RX ORDER — POTASSIUM CHLORIDE 20 MEQ/1
20 TABLET, EXTENDED RELEASE ORAL ONCE
Status: COMPLETED | OUTPATIENT
Start: 2018-06-28 | End: 2018-06-28

## 2018-06-28 RX ORDER — OXYCODONE HYDROCHLORIDE 10 MG/1
10 TABLET ORAL EVERY 4 HOURS PRN
Qty: 20 TABLET | Refills: 0
Start: 2018-06-28 | End: 2018-07-17 | Stop reason: HOSPADM

## 2018-06-28 RX ADMIN — ACETAMINOPHEN 325 MG: 325 TABLET ORAL at 06:06

## 2018-06-28 RX ADMIN — ALBUTEROL SULFATE 2.5 MG: 2.5 SOLUTION RESPIRATORY (INHALATION) at 09:03

## 2018-06-28 RX ADMIN — NICOTINE 1 PATCH: 14 PATCH, EXTENDED RELEASE TRANSDERMAL at 08:57

## 2018-06-28 RX ADMIN — BUDESONIDE AND FORMOTEROL FUMARATE DIHYDRATE 2 PUFF: 160; 4.5 AEROSOL RESPIRATORY (INHALATION) at 08:58

## 2018-06-28 RX ADMIN — HYDROMORPHONE HYDROCHLORIDE 0.5 MG: 1 INJECTION, SOLUTION INTRAMUSCULAR; INTRAVENOUS; SUBCUTANEOUS at 02:32

## 2018-06-28 RX ADMIN — HYDROMORPHONE HYDROCHLORIDE 0.5 MG: 1 INJECTION, SOLUTION INTRAMUSCULAR; INTRAVENOUS; SUBCUTANEOUS at 06:42

## 2018-06-28 RX ADMIN — OXYCODONE HYDROCHLORIDE 10 MG: 10 TABLET ORAL at 08:57

## 2018-06-28 RX ADMIN — Medication 3.38 G: at 13:02

## 2018-06-28 RX ADMIN — TRAMADOL HYDROCHLORIDE 50 MG: 50 TABLET, FILM COATED ORAL at 06:06

## 2018-06-28 RX ADMIN — Medication 3.38 G: at 01:18

## 2018-06-28 RX ADMIN — HEPARIN SODIUM 5000 UNITS: 5000 INJECTION, SOLUTION INTRAVENOUS; SUBCUTANEOUS at 06:07

## 2018-06-28 RX ADMIN — Medication 30000 UNITS: at 08:59

## 2018-06-28 RX ADMIN — FOLIC ACID 1 MG: 1 TABLET ORAL at 08:57

## 2018-06-28 RX ADMIN — AMIODARONE HYDROCHLORIDE 200 MG: 200 TABLET ORAL at 08:57

## 2018-06-28 RX ADMIN — PSYLLIUM HUSK 1 PACKET: 3.4 POWDER ORAL at 08:58

## 2018-06-28 RX ADMIN — Medication 3.38 G: at 06:07

## 2018-06-28 RX ADMIN — ASPIRIN 81 MG 81 MG: 81 TABLET ORAL at 08:57

## 2018-06-28 RX ADMIN — POTASSIUM CHLORIDE 20 MEQ: 1500 TABLET, EXTENDED RELEASE ORAL at 08:57

## 2018-06-28 RX ADMIN — Medication 400 MG: at 08:57

## 2018-06-28 RX ADMIN — ALBUTEROL SULFATE 2.5 MG: 2.5 SOLUTION RESPIRATORY (INHALATION) at 15:34

## 2018-06-28 RX ADMIN — Medication 100 MG: at 08:57

## 2018-06-28 RX ADMIN — TIOTROPIUM BROMIDE 18 MCG: 18 CAPSULE ORAL; RESPIRATORY (INHALATION) at 08:58

## 2018-06-28 NOTE — PLAN OF CARE
Problem: DISCHARGE PLANNING - CARE MANAGEMENT  Goal: Discharge to post-acute care or home with appropriate resources  INTERVENTIONS:  - Conduct assessment to determine patient/family and health care team treatment goals, and need for post-acute services based on payer coverage, community resources, and patient preferences, and barriers to discharge  - Address psychosocial, clinical, and financial barriers to discharge as identified in assessment in conjunction with the patient/family and health care team  - Arrange appropriate level of post-acute services according to patient's   needs and preference and payer coverage in collaboration with the physician and health care team  - Communicate with and update the patient/family, physician, and health care team regarding progress on the discharge plan  - Arrange appropriate transportation to post-acute venues  - Complete referral to [facility]  - Provide education and referral to HOST     Outcome: Adequate for Discharge

## 2018-06-28 NOTE — DISCHARGE INSTRUCTIONS
F/U with Dr Ally Perez in 1 week  Have labwork a CBC/BMP preformed in 1 week  Complete 4 more days of antibiotics keflex/flagyl  Do not drive while taking narcotic pain medication  Return to the ED/call with any fevers/chills, increased ileostomy function  Daily midline wound dressing changes; wet to dry dressing  Drainage Tube Removal    Your drainage tube was removed today  What you need know at home:   Keep a clean dry dressing at the tube site until the small opening closes  It will take twenty four to forty eight hours  Keep the site dry until it heals  A small amount of drainage on your dressing is normal  Resume your normal diet  Small sips of flat soda will help with any nausea  Contact Interventional Radiology for any of the following: You have pain, fever greater than 101, shaking chills  If you have increased redness or swelling at the site  I the drainage from your site does not stop  If the site drains pus or has a bad odor       Contact Interventional Radiology at [phone]   Nataly PATIENTS: Contact Interventional Radiology at [phone]) Cristo Goncalves PATIENTS: Contact Interventional Radiology at [phone]) if:

## 2018-06-28 NOTE — PROGRESS NOTES
Progress Note - Colorectal Surgery   Bobbi Nguyen 66 y o  male MRN: [id number]  Unit/Bed#: [facility] 611-01 Encounter: [id number]    Assessment:  66 M with high output ileostomy and abdominal wound  -Found to have a small anastomotic leak on CT 6/20  -6/24 CTAP scan shows stable collections, mostly likely abscesses, and stable dot of free air possible developing fistula tract  - 6/27 s/p IR drain      Plan:  Discharge planning with drain - to go home  10 days total antibiotics    Subjective/Objective     Subjective: Drain placed by IR  Some purulent drainage initially  Objective:    Blood pressure 105/62, pulse 86, temperature (!) 97 4 °F (36 3 °C), temperature source Oral, resp  rate 18, height 5' 7" (1 702 m), weight 72 5 kg (159 lb 13 3 oz), SpO2 97 %  ,Body mass index is 25 03 kg/m²  Intake/Output Summary (Last 24 hours) at 06/28/18 0600  Last data filed at 06/28/18 0501   Gross per 24 hour   Intake              280 ml   Output             1977 ml   Net            -1697 ml       Invasive Devices     Peripheral Intravenous Line            Peripheral IV 06/28/18 Left Antecubital less than 1 day          Drain            Colostomy  RUQ 36 days    Closed/Suction Drain Left Abdomen Bulb 8 5 Fr  less than 1 day                Physical Exam:    Gen: NAD, AAOx3  CV: RRR  Pulm: no resp distress  Abd: Soft, non-distended, mildly tender  Ileostomy functioning  Drain in place  Had just been emptied           Results from last 7 days  Lab Units 06/28/18  0507 06/27/18  0528 06/26/18  0736   WBC Thousand/uL 17 36* 18 48* 22 32*   HEMOGLOBIN g/dL 8 5* 9 4* 9 6*   HEMATOCRIT % 28 8* 31 7* 32 6*   PLATELETS Thousands/uL 454* 489* 387       Results from last 7 days  Lab Units 06/27/18  0528 06/25/18  0724 06/24/18  0552   SODIUM mmol/L 139 138 139   POTASSIUM mmol/L 4 0 4 0 4 9   CHLORIDE mmol/L 103 102 105   CO2 mmol/L 29 28 19*   BUN mg/dL 11 7 7   CREATININE mg/dL 0 88 0 70 0 85   GLUCOSE RANDOM mg/dL 82 75 70   CALCIUM mg/dL 8 6 8 4 8 3       Results from last 7 days  Lab Units 06/25/18  0723   INR  1 07

## 2018-06-28 NOTE — PROGRESS NOTES
Instructed patient on how to empty SANTA and how to squeeze the bulb for suction  Patient verbalizes and demonstrates understanding

## 2018-06-28 NOTE — RESTORATIVE TECHNICIAN NOTE
Restorative Specialist Mobility Note       Activity: Ambulate in gleason, Ambulate in room, Bathroom privileges, Dangle, Stand at bedside (Educated/encouraged pt to ambulate with assistance 3-4 x's/day  Bed alarm on   Pt callbell, phone/tray within reach )     Assistive Device: Front wheel walker     Maciel GALAN, Restorative Technician, [employer] [facility] ATTENDING CERTIFICATION

## 2018-06-28 NOTE — SOCIAL WORK
Pt medially cleared for discharge, will be transported by family member Angel Chris atv 4/4:30PM  As per [facility] will see patient Within 48 hrs of d/c

## 2018-06-28 NOTE — DISCHARGE SUMMARY
Discharge Summary - Colorectal Surgery   Rena Diaz 66 y o  male MRN: [id number]  Unit/Bed#: [facility] 611-01 Encounter: [id number]    Admission Date:   Admission Orders     Ordered        06/13/18 2107  Inpatient Admission (expected length of stay for this patient is greater than two midnights)  Once               Admitting Diagnosis: Dehydration [E86 0]  Hypotension [I95 9]  Perforation of colon ([address] ) [K63 1]  Acute renal failure, unspecified acute renal failure type ([address] ) [N17 9]  Leukocytosis, unspecified type [D72 829]  Presence of ileostomy ([address] ) [Z93 2]    HPI: Dg Snell a 66 y  o  male with past medical history significant for COPD, hypertension, coronary artery disease status post PCI in 2012, status post right hemicolectomy secondary to perforated transverse colon, alcohol abuse, tobacco abuse and ulcerative colitis/Crohn's who presents the emergency department this evening after having been evaluated by [facility] while he was at home and noted to have significant hypotension with a systolic blood pressure in the 60s   The patient at that point had no significant complaints and was asymptomatic   He did affirm some mild fatigue to the EMS staff  Myra Blocker the time of presentation to the emergency department his blood pressure was 66/41 and he was mentating appropriately with warm and well perfused extremities   At that point he appeared severely volume depleted and was started on aggressive IV fluid resuscitation  Jayne Almonte was evaluated with a bedside ultrasound which showed collapsibility of his IVC further indicating his severely volume depleted status   At this point was determined that the patient was appropriate for admission to the medicine service with the Critical Care Resource consultation  Per Freddie Moreau  Internal Medicine      Procedures Performed:   Orders Placed This Encounter   Procedures    ED ECG Documentation Only       Hospital Course:   Patient was admitted to the internal medicine service and transferred to medical critical care for hypotension, high output ileostomy, sepsis, DEBBY, alcohol withdrawal  He was on ambar-synephrine for hemodynamic support, given phenobarb for possible DT ppx, he had a left lung base infiltrate and started on IV abx, and on IV steroids  After 2 days in the MICU he weaned off pressors, blood cultures were negative for growth and he was transferred to the internal medicine SOD service  He was continued to be treated for HCAP w/ cefepime for 7 days  Colorectal surgery became involved because de has a history of a right hemicolectomy (Dr Ally Perez 1122) that was complicated by exlap with repair of perforation by resection, primary anastomosis and diverting loop ileostomy  During his admission colorectal surgery was consulted for high ileostomy output  He was placed on imodium and metamucil as tolerated with a regular diet, he had ileostomy replacements for dehydration  His ileostomy output decreased to an acceptable level and imodium was d/c'd and fluids and he was continued to encourage metamucil usage  He also had an abdominal wound which was treated with wet to dry dressing changes during hospitalization, area is healthy and pink his whole admission  On hospital day 5 he developed a leukocytosis 21K  CT was done for a persistent leukocytosis and showed focus of free air around anastomosis, indicating a small anastomotic leak  Follow up CT on 6/24 showed several small collections and a possible sinus tract with stable free air  His abdomen remained non tender, ND, with bowel function  IR was consulted for drainage due to his persistent leukocytosis, an 8 5 drain was placed in the LUQ fluid collection, 17 cc pus was aspirated  The collection was close to a small bowel loop or possibly intramural-so there is a risk he could develop a fistula tract, he is diverted with his ileostomy so we proceeded with drainage   He completed an 8 day course of Zosyn and will be transitioned to PO abx  OT cleared him for home  PT cleared him for home PT vs STR and he  is adamant about going home  His WBC is trending downward, although remains elevated at 17 3, however his is adamant that he be discharged home today  He will follow up with Dr Sung Rocha in 1 week  He will have a CBC/BMP in one week  He will finish a course of abx with 4 more days PO keflex/flagyl  He is given a pain prescription  [facility] is set up for his abdominal wound care and drain care  Significant Findings, Care, Treatment and Services Provided:   6/13 CTAP: LLL consolidation for PNA  No bowel obstruction ot abscess  6/14 ECHO: EF 50%, akinesis of the basal inferoseptal  6/18 C-diff: negative  6/20 CTAP:  Focus of free air adjacent to the mid abdominal bowel anastomosisDiffuse mesenteric edema likely reactive, bladder wall thickening left basilar effusion   6/24 CTAP: extra luminal gas unchanged, possible developing sinus tract, several small collections, larges 3 8x1 9, unchanged  Mesenteric edema  6/27 IR drainage: 17cc pus drained    Lab Results: I have personally reviewed pertinent lab results      Complications: abdominal abscesses    Discharge Diagnosis: Dehydration [E86 0]  Hypotension [I95 9]  Perforation of colon ([facility]) [K63 1]  Acute renal failure, unspecified acute renal failure type ([address] ) [N17 9]  Leukocytosis, unspecified type [D72 829]  Presence of ileostomy ([address] ) [Z93 2]    Resolved Problems  Date Reviewed: 6/21/2018          Resolved    Acute kidney failure ([facility] ) 6/20/2018     Resolved by  Xiomara Barrientos MD    * (Principal)Hypotension 6/20/2018     Resolved by  Xiomara Barrientos MD    Encephalopathy 6/20/2018     Resolved by  Xiomara Barrientos MD    Dehydration 6/20/2018     Resolved by  Xiomara Barirentos MD    Sepsis [facility]) 6/20/2018     Resolved by  Xiomara Barrientos MD          Condition at Discharge: fair         Discharge instructions/Information to patient and family:   See after visit summary for information provided to patient and family  Provisions for Follow-Up Care:  See after visit summary for information related to follow-up care and any pertinent home health orders  Disposition: See After Visit Summary for discharge disposition information  Planned Readmission: Yes high likelihood due to multiple abdominal abscesses    Discharge Statement   I spent 30 minutes discharging the patient  This time was spent on the day of discharge  I had direct contact with the patient on the day of discharge  Additional documentation is required if more than 30 minutes were spent on discharge  Discharge Medications:  See after visit summary for reconciled discharge medications provided to patient and family

## 2018-06-29 ENCOUNTER — TRANSITIONAL CARE MANAGEMENT (OUTPATIENT)
Dept: INTERNAL MEDICINE CLINIC | Facility: CLINIC | Age: 58
End: 2018-06-29

## 2018-06-30 LAB
BACTERIA SPEC BFLD CULT: NORMAL
GRAM STN SPEC: NORMAL
GRAM STN SPEC: NORMAL

## 2018-07-03 ENCOUNTER — HOSPITAL ENCOUNTER (OUTPATIENT)
Dept: RADIOLOGY | Facility: HOSPITAL | Age: 58
Discharge: HOME | End: 2018-07-03
Admitting: RADIOLOGY
Payer: COMMERCIAL

## 2018-07-03 DIAGNOSIS — K65.1 INTRA-ABDOMINAL ABSCESS (HCC): ICD-10-CM

## 2018-07-03 PROCEDURE — 49424 ASSESS CYST CONTRAST INJECT: CPT

## 2018-07-03 PROCEDURE — 76080 X-RAY EXAM OF FISTULA: CPT | Performed by: RADIOLOGY

## 2018-07-03 PROCEDURE — 49424 ASSESS CYST CONTRAST INJECT: CPT | Performed by: RADIOLOGY

## 2018-07-03 PROCEDURE — 76080 X-RAY EXAM OF FISTULA: CPT

## 2018-07-03 RX ADMIN — IOHEXOL 1 ML: 300 INJECTION, SOLUTION INTRAVENOUS at 15:04

## 2018-07-03 NOTE — SEDATION DOCUMENTATION
Pt here for abscess drain check  Pt reports that he has not been flushing as he did not receive any instructions and last evening, reports that suction failed as tab was broken  For repeat check in one week and no flushing of tube as per Dr Randy Laureano  Appointment made for Tuesday 7/10 at 1430  New suction bulb attached and patient taught to empty contents daily, measure and reapply suction  Information given also to female

## 2018-07-05 DIAGNOSIS — Z93.2 ILEOSTOMY IN PLACE (HCC): ICD-10-CM

## 2018-07-05 RX ORDER — OXYCODONE HYDROCHLORIDE 10 MG/1
10 TABLET ORAL EVERY 4 HOURS PRN
Qty: 20 TABLET | Refills: 0
Start: 2018-07-05 | End: 2018-07-15

## 2018-07-05 NOTE — TELEPHONE ENCOUNTER
I called patient to inform him he must call the surgeon  Can you please address the refill (deny it) so that the task can be completed?

## 2018-07-05 NOTE — TELEPHONE ENCOUNTER
Patient called office requesting refill of Oxycodone  I am having difficulty understanding who prescribed oxycodone inpatient  Was it PCP or Colon/rectal?    Patient states he has a 7/10 abdominal pain that is sharp, but comes and goes  I asked him if he took at OTC pain medications, but he said no and doesn't think they will work  He has a TCM appt on 7/10/18 with Dr Maulik Loza

## 2018-07-08 ENCOUNTER — APPOINTMENT (EMERGENCY)
Dept: RADIOLOGY | Facility: HOSPITAL | Age: 58
DRG: 720 | End: 2018-07-08
Payer: COMMERCIAL

## 2018-07-08 ENCOUNTER — APPOINTMENT (INPATIENT)
Dept: RADIOLOGY | Facility: HOSPITAL | Age: 58
DRG: 720 | End: 2018-07-08
Payer: COMMERCIAL

## 2018-07-08 ENCOUNTER — HOSPITAL ENCOUNTER (INPATIENT)
Facility: HOSPITAL | Age: 58
LOS: 9 days | Discharge: HOME HEALTH | DRG: 720 | End: 2018-07-17
Attending: EMERGENCY MEDICINE | Admitting: SURGERY
Payer: COMMERCIAL

## 2018-07-08 DIAGNOSIS — Z93.2 ILEOSTOMY IN PLACE (HCC): ICD-10-CM

## 2018-07-08 DIAGNOSIS — N17.9 ACUTE RENAL FAILURE (ARF) (HCC): ICD-10-CM

## 2018-07-08 DIAGNOSIS — I95.9 HYPOTENSION: ICD-10-CM

## 2018-07-08 DIAGNOSIS — F10.239 ALCOHOL DEPENDENCE WITH WITHDRAWAL WITH COMPLICATION (HCC): ICD-10-CM

## 2018-07-08 DIAGNOSIS — R21 GROIN RASH: ICD-10-CM

## 2018-07-08 DIAGNOSIS — E87.5 HYPERKALEMIA: ICD-10-CM

## 2018-07-08 DIAGNOSIS — K65.1 INTRA-ABDOMINAL ABSCESS (HCC): Primary | ICD-10-CM

## 2018-07-08 DIAGNOSIS — R57.1 HYPOVOLEMIC SHOCK (HCC): ICD-10-CM

## 2018-07-08 DIAGNOSIS — I25.10 CORONARY ARTERY DISEASE INVOLVING NATIVE CORONARY ARTERY OF NATIVE HEART WITHOUT ANGINA PECTORIS: ICD-10-CM

## 2018-07-08 DIAGNOSIS — D72.19 EOSINOPHILIC LEUKOCYTOSIS: ICD-10-CM

## 2018-07-08 PROBLEM — R65.21 SEPTIC SHOCK (HCC): Status: ACTIVE | Noted: 2018-05-15

## 2018-07-08 PROBLEM — A41.9 SEPTIC SHOCK (HCC): Status: ACTIVE | Noted: 2018-05-15

## 2018-07-08 LAB
ABO GROUP BLD: NORMAL
ALBUMIN SERPL BCP-MCNC: 2.7 G/DL (ref 3.5–5)
ALP SERPL-CCNC: 132 U/L (ref 46–116)
ALT SERPL W P-5'-P-CCNC: 12 U/L (ref 12–78)
AMMONIA PLAS-SCNC: 27 UMOL/L (ref 11–35)
ANION GAP BLD CALC-SCNC: 17 MMOL/L (ref 4–13)
ANION GAP BLD CALC-SCNC: 18 MMOL/L (ref 4–13)
ANION GAP SERPL CALCULATED.3IONS-SCNC: 11 MMOL/L (ref 4–13)
ANION GAP SERPL CALCULATED.3IONS-SCNC: 12 MMOL/L (ref 4–13)
ANION GAP SERPL CALCULATED.3IONS-SCNC: 8 MMOL/L (ref 4–13)
ANION GAP SERPL CALCULATED.3IONS-SCNC: 8 MMOL/L (ref 4–13)
ANISOCYTOSIS BLD QL SMEAR: PRESENT
APTT PPP: 27 SECONDS (ref 24–36)
AST SERPL W P-5'-P-CCNC: 21 U/L (ref 5–45)
ATRIAL RATE: 86 BPM
BACTERIA UR QL AUTO: ABNORMAL /HPF
BASE EXCESS BLDA CALC-SCNC: 1 MMOL/L (ref -2–3)
BASOPHILS # BLD MANUAL: 0 THOUSAND/UL (ref 0–0.1)
BASOPHILS NFR MAR MANUAL: 0 % (ref 0–1)
BILIRUB SERPL-MCNC: 0.47 MG/DL (ref 0.2–1)
BILIRUB UR QL STRIP: ABNORMAL
BLD GP AB SCN SERPL QL: NEGATIVE
BUN BLD-MCNC: 64 MG/DL (ref 5–25)
BUN BLD-MCNC: 82 MG/DL (ref 5–25)
BUN SERPL-MCNC: 43 MG/DL (ref 5–25)
BUN SERPL-MCNC: 47 MG/DL (ref 5–25)
BUN SERPL-MCNC: 53 MG/DL (ref 5–25)
BUN SERPL-MCNC: 60 MG/DL (ref 5–25)
CA-I BLD-SCNC: 0.93 MMOL/L (ref 1.12–1.32)
CA-I BLD-SCNC: 0.93 MMOL/L (ref 1.12–1.32)
CA-I BLD-SCNC: 0.97 MMOL/L (ref 1.12–1.32)
CALCIUM SERPL-MCNC: 7.7 MG/DL (ref 8.3–10.1)
CALCIUM SERPL-MCNC: 7.7 MG/DL (ref 8.3–10.1)
CALCIUM SERPL-MCNC: 7.9 MG/DL (ref 8.3–10.1)
CALCIUM SERPL-MCNC: 8.7 MG/DL (ref 8.3–10.1)
CHLORIDE BLD-SCNC: 83 MMOL/L (ref 100–108)
CHLORIDE BLD-SCNC: 88 MMOL/L (ref 100–108)
CHLORIDE SERPL-SCNC: 100 MMOL/L (ref 100–108)
CHLORIDE SERPL-SCNC: 81 MMOL/L (ref 100–108)
CHLORIDE SERPL-SCNC: 94 MMOL/L (ref 100–108)
CHLORIDE SERPL-SCNC: 96 MMOL/L (ref 100–108)
CLARITY UR: CLEAR
CO2 SERPL-SCNC: 20 MMOL/L (ref 21–32)
CO2 SERPL-SCNC: 20 MMOL/L (ref 21–32)
CO2 SERPL-SCNC: 21 MMOL/L (ref 21–32)
CO2 SERPL-SCNC: 22 MMOL/L (ref 21–32)
COLOR UR: YELLOW
CREAT BLD-MCNC: 5.6 MG/DL (ref 0.6–1.3)
CREAT BLD-MCNC: 6.1 MG/DL (ref 0.6–1.3)
CREAT SERPL-MCNC: 2.67 MG/DL (ref 0.6–1.3)
CREAT SERPL-MCNC: 3.42 MG/DL (ref 0.6–1.3)
CREAT SERPL-MCNC: 4.16 MG/DL (ref 0.6–1.3)
CREAT SERPL-MCNC: 5.46 MG/DL (ref 0.6–1.3)
EOSINOPHIL # BLD MANUAL: 0 THOUSAND/UL (ref 0–0.4)
EOSINOPHIL NFR BLD MANUAL: 0 % (ref 0–6)
ERYTHROCYTE [DISTWIDTH] IN BLOOD BY AUTOMATED COUNT: 16.6 % (ref 11.6–15.1)
GFR SERPL CREATININE-BSD FRML MDRD: 10 ML/MIN/1.73SQ M
GFR SERPL CREATININE-BSD FRML MDRD: 11 ML/MIN/1.73SQ M
GFR SERPL CREATININE-BSD FRML MDRD: 15 ML/MIN/1.73SQ M
GFR SERPL CREATININE-BSD FRML MDRD: 19 ML/MIN/1.73SQ M
GFR SERPL CREATININE-BSD FRML MDRD: 26 ML/MIN/1.73SQ M
GFR SERPL CREATININE-BSD FRML MDRD: 9 ML/MIN/1.73SQ M
GLUCOSE SERPL-MCNC: 103 MG/DL (ref 65–140)
GLUCOSE SERPL-MCNC: 104 MG/DL (ref 65–140)
GLUCOSE SERPL-MCNC: 114 MG/DL (ref 65–140)
GLUCOSE SERPL-MCNC: 125 MG/DL (ref 65–140)
GLUCOSE SERPL-MCNC: 130 MG/DL (ref 65–140)
GLUCOSE SERPL-MCNC: 133 MG/DL (ref 65–140)
GLUCOSE SERPL-MCNC: 148 MG/DL (ref 65–140)
GLUCOSE SERPL-MCNC: 92 MG/DL (ref 65–140)
GLUCOSE SERPL-MCNC: 97 MG/DL (ref 65–140)
GLUCOSE UR STRIP-MCNC: NEGATIVE MG/DL
HCO3 BLDA-SCNC: 24.4 MMOL/L (ref 24–30)
HCT VFR BLD AUTO: 31.8 % (ref 36.5–49.3)
HCT VFR BLD CALC: 26 % (ref 36.5–49.3)
HCT VFR BLD CALC: 34 % (ref 36.5–49.3)
HCT VFR BLD CALC: 34 % (ref 36.5–49.3)
HGB BLD-MCNC: 10.5 G/DL (ref 12–17)
HGB BLDA-MCNC: 11.6 G/DL (ref 12–17)
HGB BLDA-MCNC: 11.6 G/DL (ref 12–17)
HGB BLDA-MCNC: 8.8 G/DL (ref 12–17)
HGB UR QL STRIP.AUTO: ABNORMAL
HYALINE CASTS #/AREA URNS LPF: ABNORMAL /LPF
INR PPP: 0.96 (ref 0.86–1.17)
KETONES UR STRIP-MCNC: NEGATIVE MG/DL
LACTATE SERPL-SCNC: 1.2 MMOL/L (ref 0.5–2)
LEUKOCYTE ESTERASE UR QL STRIP: ABNORMAL
LYMPHOCYTES # BLD AUTO: 1.12 THOUSAND/UL (ref 0.6–4.47)
LYMPHOCYTES # BLD AUTO: 5 % (ref 14–44)
MAGNESIUM SERPL-MCNC: 1 MG/DL (ref 1.6–2.6)
MAGNESIUM SERPL-MCNC: 3.3 MG/DL (ref 1.6–2.6)
MCH RBC QN AUTO: 29.3 PG (ref 26.8–34.3)
MCHC RBC AUTO-ENTMCNC: 33 G/DL (ref 31.4–37.4)
MCV RBC AUTO: 89 FL (ref 82–98)
MICROCYTES BLD QL AUTO: PRESENT
MONOCYTES # BLD AUTO: 1.12 THOUSAND/UL (ref 0–1.22)
MONOCYTES NFR BLD: 5 % (ref 4–12)
NEUTROPHILS # BLD MANUAL: 19.24 THOUSAND/UL (ref 1.85–7.62)
NEUTS SEG NFR BLD AUTO: 86 % (ref 43–75)
NITRITE UR QL STRIP: NEGATIVE
NON-SQ EPI CELLS URNS QL MICRO: ABNORMAL /HPF
NRBC BLD AUTO-RTO: 0 /100 WBCS
OTHER STN SPEC: ABNORMAL
P AXIS: 66 DEGREES
PCO2 BLD: 21 MMOL/L (ref 21–32)
PCO2 BLD: 24 MMOL/L (ref 21–32)
PCO2 BLD: 26 MMOL/L (ref 21–32)
PCO2 BLD: 35.3 MM HG (ref 42–50)
PH BLD: 7.45 [PH] (ref 7.3–7.4)
PH UR STRIP.AUTO: 5.5 [PH] (ref 4.5–8)
PHOSPHATE SERPL-MCNC: 4.5 MG/DL (ref 2.7–4.5)
PLATELET # BLD AUTO: 499 THOUSANDS/UL (ref 149–390)
PLATELET # BLD AUTO: 617 THOUSANDS/UL (ref 149–390)
PLATELET BLD QL SMEAR: ABNORMAL
PMV BLD AUTO: 10.4 FL (ref 8.9–12.7)
PMV BLD AUTO: 10.6 FL (ref 8.9–12.7)
PO2 BLD: 17 MM HG (ref 35–45)
POLYCHROMASIA BLD QL SMEAR: PRESENT
POTASSIUM BLD-SCNC: 4.9 MMOL/L (ref 3.5–5.3)
POTASSIUM BLD-SCNC: 6.9 MMOL/L (ref 3.5–5.3)
POTASSIUM BLD-SCNC: 6.9 MMOL/L (ref 3.5–5.3)
POTASSIUM SERPL-SCNC: 4.5 MMOL/L (ref 3.5–5.3)
POTASSIUM SERPL-SCNC: 4.6 MMOL/L (ref 3.5–5.3)
POTASSIUM SERPL-SCNC: 4.8 MMOL/L (ref 3.5–5.3)
POTASSIUM SERPL-SCNC: 5.9 MMOL/L (ref 3.5–5.3)
PR INTERVAL: 168 MS
PROCALCITONIN SERPL-MCNC: 1.12 NG/ML
PROT SERPL-MCNC: 8.8 G/DL (ref 6.4–8.2)
PROT UR STRIP-MCNC: ABNORMAL MG/DL
PROTHROMBIN TIME: 12.9 SECONDS (ref 11.8–14.2)
QRS AXIS: 98 DEGREES
QRSD INTERVAL: 112 MS
QT INTERVAL: 400 MS
QTC INTERVAL: 478 MS
RBC # BLD AUTO: 3.58 MILLION/UL (ref 3.88–5.62)
RBC #/AREA URNS AUTO: ABNORMAL /HPF
RBC MORPH BLD: PRESENT
RH BLD: POSITIVE
SAO2 % BLD FROM PO2: 26 % (ref 95–98)
SODIUM BLD-SCNC: 116 MMOL/L (ref 136–145)
SODIUM BLD-SCNC: 117 MMOL/L (ref 136–145)
SODIUM BLD-SCNC: 121 MMOL/L (ref 136–145)
SODIUM SERPL-SCNC: 115 MMOL/L (ref 136–145)
SODIUM SERPL-SCNC: 124 MMOL/L (ref 136–145)
SODIUM SERPL-SCNC: 125 MMOL/L (ref 136–145)
SODIUM SERPL-SCNC: 129 MMOL/L (ref 136–145)
SP GR UR STRIP.AUTO: 1.01 (ref 1–1.03)
SPECIMEN EXPIRATION DATE: NORMAL
SPECIMEN SOURCE: ABNORMAL
T WAVE AXIS: 76 DEGREES
UROBILINOGEN UR QL STRIP.AUTO: 0.2 E.U./DL
VARIANT LYMPHS # BLD AUTO: 4 %
VENTRICULAR RATE: 86 BPM
WBC # BLD AUTO: 22.37 THOUSAND/UL (ref 4.31–10.16)
WBC #/AREA URNS AUTO: ABNORMAL /HPF
WBC TOXIC VACUOLES BLD QL SMEAR: PRESENT

## 2018-07-08 PROCEDURE — 80053 COMPREHEN METABOLIC PANEL: CPT | Performed by: EMERGENCY MEDICINE

## 2018-07-08 PROCEDURE — 85007 BL SMEAR W/DIFF WBC COUNT: CPT | Performed by: EMERGENCY MEDICINE

## 2018-07-08 PROCEDURE — 85610 PROTHROMBIN TIME: CPT | Performed by: EMERGENCY MEDICINE

## 2018-07-08 PROCEDURE — 84100 ASSAY OF PHOSPHORUS: CPT | Performed by: NURSE PRACTITIONER

## 2018-07-08 PROCEDURE — 99291 CRITICAL CARE FIRST HOUR: CPT | Performed by: SURGERY

## 2018-07-08 PROCEDURE — 85049 AUTOMATED PLATELET COUNT: CPT | Performed by: EMERGENCY MEDICINE

## 2018-07-08 PROCEDURE — 86901 BLOOD TYPING SEROLOGIC RH(D): CPT

## 2018-07-08 PROCEDURE — 80048 BASIC METABOLIC PNL TOTAL CA: CPT | Performed by: EMERGENCY MEDICINE

## 2018-07-08 PROCEDURE — 87086 URINE CULTURE/COLONY COUNT: CPT

## 2018-07-08 PROCEDURE — 82140 ASSAY OF AMMONIA: CPT | Performed by: PHYSICIAN ASSISTANT

## 2018-07-08 PROCEDURE — 93010 ELECTROCARDIOGRAM REPORT: CPT

## 2018-07-08 PROCEDURE — 86900 BLOOD TYPING SEROLOGIC ABO: CPT

## 2018-07-08 PROCEDURE — 85730 THROMBOPLASTIN TIME PARTIAL: CPT | Performed by: EMERGENCY MEDICINE

## 2018-07-08 PROCEDURE — 99285 EMERGENCY DEPT VISIT HI MDM: CPT

## 2018-07-08 PROCEDURE — 74176 CT ABD & PELVIS W/O CONTRAST: CPT

## 2018-07-08 PROCEDURE — 36620 INSERTION CATHETER ARTERY: CPT | Performed by: EMERGENCY MEDICINE

## 2018-07-08 PROCEDURE — 82330 ASSAY OF CALCIUM: CPT

## 2018-07-08 PROCEDURE — 84145 PROCALCITONIN (PCT): CPT | Performed by: EMERGENCY MEDICINE

## 2018-07-08 PROCEDURE — 82948 REAGENT STRIP/BLOOD GLUCOSE: CPT

## 2018-07-08 PROCEDURE — 80048 BASIC METABOLIC PNL TOTAL CA: CPT | Performed by: NURSE PRACTITIONER

## 2018-07-08 PROCEDURE — 71045 X-RAY EXAM CHEST 1 VIEW: CPT

## 2018-07-08 PROCEDURE — 36556 INSERT NON-TUNNEL CV CATH: CPT | Performed by: EMERGENCY MEDICINE

## 2018-07-08 PROCEDURE — 93005 ELECTROCARDIOGRAM TRACING: CPT

## 2018-07-08 PROCEDURE — 86850 RBC ANTIBODY SCREEN: CPT

## 2018-07-08 PROCEDURE — 94640 AIRWAY INHALATION TREATMENT: CPT

## 2018-07-08 PROCEDURE — 81001 URINALYSIS AUTO W/SCOPE: CPT

## 2018-07-08 PROCEDURE — 85027 COMPLETE CBC AUTOMATED: CPT | Performed by: EMERGENCY MEDICINE

## 2018-07-08 PROCEDURE — 02HV33Z INSERTION OF INFUSION DEVICE INTO SUPERIOR VENA CAVA, PERCUTANEOUS APPROACH: ICD-10-PCS | Performed by: SURGERY

## 2018-07-08 PROCEDURE — 83605 ASSAY OF LACTIC ACID: CPT | Performed by: EMERGENCY MEDICINE

## 2018-07-08 PROCEDURE — 94760 N-INVAS EAR/PLS OXIMETRY 1: CPT

## 2018-07-08 PROCEDURE — 96365 THER/PROPH/DIAG IV INF INIT: CPT

## 2018-07-08 PROCEDURE — 36415 COLL VENOUS BLD VENIPUNCTURE: CPT | Performed by: EMERGENCY MEDICINE

## 2018-07-08 PROCEDURE — 82803 BLOOD GASES ANY COMBINATION: CPT

## 2018-07-08 PROCEDURE — 84295 ASSAY OF SERUM SODIUM: CPT

## 2018-07-08 PROCEDURE — 87040 BLOOD CULTURE FOR BACTERIA: CPT | Performed by: EMERGENCY MEDICINE

## 2018-07-08 PROCEDURE — 83735 ASSAY OF MAGNESIUM: CPT | Performed by: NURSE PRACTITIONER

## 2018-07-08 PROCEDURE — 96375 TX/PRO/DX INJ NEW DRUG ADDON: CPT

## 2018-07-08 PROCEDURE — 82947 ASSAY GLUCOSE BLOOD QUANT: CPT

## 2018-07-08 PROCEDURE — 84132 ASSAY OF SERUM POTASSIUM: CPT

## 2018-07-08 PROCEDURE — 80047 BASIC METABLC PNL IONIZED CA: CPT

## 2018-07-08 PROCEDURE — 85014 HEMATOCRIT: CPT

## 2018-07-08 RX ORDER — DEXTROSE MONOHYDRATE 25 G/50ML
50 INJECTION, SOLUTION INTRAVENOUS ONCE
Status: COMPLETED | OUTPATIENT
Start: 2018-07-08 | End: 2018-07-08

## 2018-07-08 RX ORDER — NOREPINEPHRINE BITARTRATE 1 MG/ML
INJECTION, SOLUTION INTRAVENOUS
Status: DISPENSED
Start: 2018-07-08 | End: 2018-07-08

## 2018-07-08 RX ORDER — ALBUTEROL SULFATE 2.5 MG/3ML
5 SOLUTION RESPIRATORY (INHALATION) EVERY 6 HOURS PRN
Status: DISCONTINUED | OUTPATIENT
Start: 2018-07-08 | End: 2018-07-08

## 2018-07-08 RX ORDER — MIDAZOLAM HYDROCHLORIDE 1 MG/ML
1 INJECTION INTRAMUSCULAR; INTRAVENOUS ONCE
Status: COMPLETED | OUTPATIENT
Start: 2018-07-08 | End: 2018-07-08

## 2018-07-08 RX ORDER — LEVALBUTEROL 1.25 MG/.5ML
1.25 SOLUTION, CONCENTRATE RESPIRATORY (INHALATION)
Status: DISCONTINUED | OUTPATIENT
Start: 2018-07-08 | End: 2018-07-17 | Stop reason: HOSPADM

## 2018-07-08 RX ORDER — LORAZEPAM 2 MG/ML
1 INJECTION INTRAMUSCULAR ONCE
Status: COMPLETED | OUTPATIENT
Start: 2018-07-08 | End: 2018-07-08

## 2018-07-08 RX ORDER — FENTANYL CITRATE 50 UG/ML
50 INJECTION, SOLUTION INTRAMUSCULAR; INTRAVENOUS EVERY 2 HOUR PRN
Status: DISCONTINUED | OUTPATIENT
Start: 2018-07-08 | End: 2018-07-09

## 2018-07-08 RX ORDER — SODIUM CHLORIDE, SODIUM LACTATE, POTASSIUM CHLORIDE, CALCIUM CHLORIDE 600; 310; 30; 20 MG/100ML; MG/100ML; MG/100ML; MG/100ML
125 INJECTION, SOLUTION INTRAVENOUS CONTINUOUS
Status: DISCONTINUED | OUTPATIENT
Start: 2018-07-08 | End: 2018-07-08

## 2018-07-08 RX ORDER — LORAZEPAM 2 MG/ML
INJECTION INTRAMUSCULAR
Status: COMPLETED
Start: 2018-07-08 | End: 2018-07-08

## 2018-07-08 RX ORDER — MAGNESIUM SULFATE HEPTAHYDRATE 40 MG/ML
4 INJECTION, SOLUTION INTRAVENOUS ONCE
Status: COMPLETED | OUTPATIENT
Start: 2018-07-08 | End: 2018-07-08

## 2018-07-08 RX ORDER — FENTANYL CITRATE 50 UG/ML
50 INJECTION, SOLUTION INTRAMUSCULAR; INTRAVENOUS ONCE
Status: COMPLETED | OUTPATIENT
Start: 2018-07-08 | End: 2018-07-08

## 2018-07-08 RX ORDER — SODIUM CHLORIDE 450 MG/100ML
125 INJECTION, SOLUTION INTRAVENOUS CONTINUOUS
Status: DISCONTINUED | OUTPATIENT
Start: 2018-07-08 | End: 2018-07-12

## 2018-07-08 RX ORDER — NYSTATIN 100000 [USP'U]/G
POWDER TOPICAL 2 TIMES DAILY
Status: DISCONTINUED | OUTPATIENT
Start: 2018-07-08 | End: 2018-07-10

## 2018-07-08 RX ORDER — ALBUTEROL SULFATE 2.5 MG/3ML
2.5 SOLUTION RESPIRATORY (INHALATION) EVERY 6 HOURS PRN
Status: DISCONTINUED | OUTPATIENT
Start: 2018-07-08 | End: 2018-07-17 | Stop reason: HOSPADM

## 2018-07-08 RX ORDER — VANCOMYCIN HYDROCHLORIDE 1 G/200ML
15 INJECTION, SOLUTION INTRAVENOUS ONCE
Status: COMPLETED | OUTPATIENT
Start: 2018-07-08 | End: 2018-07-08

## 2018-07-08 RX ORDER — LORAZEPAM 2 MG/ML
1 INJECTION INTRAMUSCULAR EVERY 4 HOURS PRN
Status: DISCONTINUED | OUTPATIENT
Start: 2018-07-08 | End: 2018-07-17 | Stop reason: HOSPADM

## 2018-07-08 RX ORDER — HEPARIN SODIUM 5000 [USP'U]/ML
5000 INJECTION, SOLUTION INTRAVENOUS; SUBCUTANEOUS EVERY 8 HOURS SCHEDULED
Status: DISCONTINUED | OUTPATIENT
Start: 2018-07-08 | End: 2018-07-17 | Stop reason: HOSPADM

## 2018-07-08 RX ORDER — LIDOCAINE HYDROCHLORIDE 10 MG/ML
10 INJECTION, SOLUTION EPIDURAL; INFILTRATION; INTRACAUDAL; PERINEURAL ONCE
Status: COMPLETED | OUTPATIENT
Start: 2018-07-08 | End: 2018-07-08

## 2018-07-08 RX ORDER — SODIUM CHLORIDE FOR INHALATION 0.9 %
3 VIAL, NEBULIZER (ML) INHALATION
Status: DISCONTINUED | OUTPATIENT
Start: 2018-07-08 | End: 2018-07-17 | Stop reason: HOSPADM

## 2018-07-08 RX ORDER — MIDAZOLAM HYDROCHLORIDE 1 MG/ML
INJECTION INTRAMUSCULAR; INTRAVENOUS
Status: COMPLETED
Start: 2018-07-08 | End: 2018-07-08

## 2018-07-08 RX ADMIN — SODIUM CHLORIDE 1000 ML: 0.9 INJECTION, SOLUTION INTRAVENOUS at 02:56

## 2018-07-08 RX ADMIN — MAGNESIUM SULFATE IN WATER 4 G: 40 INJECTION, SOLUTION INTRAVENOUS at 14:00

## 2018-07-08 RX ADMIN — NICOTINE 7 MG: 7 PATCH, EXTENDED RELEASE TRANSDERMAL at 09:27

## 2018-07-08 RX ADMIN — DEXMEDETOMIDINE HYDROCHLORIDE 0.55 MCG/KG/HR: 100 INJECTION, SOLUTION INTRAVENOUS at 14:00

## 2018-07-08 RX ADMIN — CEFEPIME HYDROCHLORIDE 2000 MG: 2 INJECTION, POWDER, FOR SOLUTION INTRAVENOUS at 04:46

## 2018-07-08 RX ADMIN — NYSTATIN 1 APPLICATION: 100000 POWDER TOPICAL at 15:02

## 2018-07-08 RX ADMIN — DEXMEDETOMIDINE HYDROCHLORIDE 0.55 MCG/KG/HR: 100 INJECTION, SOLUTION INTRAVENOUS at 10:44

## 2018-07-08 RX ADMIN — VANCOMYCIN HYDROCHLORIDE 1000 MG: 1 INJECTION, SOLUTION INTRAVENOUS at 09:22

## 2018-07-08 RX ADMIN — SODIUM BICARBONATE 50 MEQ: 84 INJECTION, SOLUTION INTRAVENOUS at 03:18

## 2018-07-08 RX ADMIN — NOREPINEPHRINE BITARTRATE 10 MCG/MIN: 1 INJECTION INTRAVENOUS at 02:45

## 2018-07-08 RX ADMIN — MIDAZOLAM HYDROCHLORIDE 1 MG: 1 INJECTION INTRAMUSCULAR; INTRAVENOUS at 07:43

## 2018-07-08 RX ADMIN — LORAZEPAM 1 MG: 2 INJECTION INTRAMUSCULAR; INTRAVENOUS at 10:43

## 2018-07-08 RX ADMIN — ALBUTEROL SULFATE 2.5 MG: 2.5 SOLUTION RESPIRATORY (INHALATION) at 11:53

## 2018-07-08 RX ADMIN — NOREPINEPHRINE BITARTRATE 8 MCG/MIN: 1 INJECTION INTRAVENOUS at 13:37

## 2018-07-08 RX ADMIN — Medication 2 G: at 03:21

## 2018-07-08 RX ADMIN — METRONIDAZOLE 500 MG: 500 INJECTION, SOLUTION INTRAVENOUS at 05:30

## 2018-07-08 RX ADMIN — SODIUM CHLORIDE 125 ML/HR: 0.45 INJECTION, SOLUTION INTRAVENOUS at 18:41

## 2018-07-08 RX ADMIN — LORAZEPAM 1 MG: 2 INJECTION INTRAMUSCULAR at 10:43

## 2018-07-08 RX ADMIN — FENTANYL CITRATE 50 MCG: 50 INJECTION INTRAMUSCULAR; INTRAVENOUS at 07:16

## 2018-07-08 RX ADMIN — HEPARIN SODIUM 5000 UNITS: 5000 INJECTION, SOLUTION INTRAVENOUS; SUBCUTANEOUS at 22:17

## 2018-07-08 RX ADMIN — FENTANYL CITRATE 50 MCG: 50 INJECTION, SOLUTION INTRAMUSCULAR; INTRAVENOUS at 05:25

## 2018-07-08 RX ADMIN — NOREPINEPHRINE BITARTRATE 10 MCG/MIN: 1 INJECTION INTRAVENOUS at 06:09

## 2018-07-08 RX ADMIN — IPRATROPIUM BROMIDE 0.5 MG: 0.5 SOLUTION RESPIRATORY (INHALATION) at 11:53

## 2018-07-08 RX ADMIN — HEPARIN SODIUM 5000 UNITS: 5000 INJECTION, SOLUTION INTRAVENOUS; SUBCUTANEOUS at 14:02

## 2018-07-08 RX ADMIN — LIDOCAINE HYDROCHLORIDE 5 ML: 10 INJECTION, SOLUTION EPIDURAL; INFILTRATION; INTRACAUDAL; PERINEURAL at 09:09

## 2018-07-08 RX ADMIN — MIDAZOLAM 1 MG: 1 INJECTION INTRAMUSCULAR; INTRAVENOUS at 07:43

## 2018-07-08 RX ADMIN — FOLIC ACID 1 MG: 5 INJECTION, SOLUTION INTRAMUSCULAR; INTRAVENOUS; SUBCUTANEOUS at 09:22

## 2018-07-08 RX ADMIN — METRONIDAZOLE 500 MG: 500 INJECTION, SOLUTION INTRAVENOUS at 14:01

## 2018-07-08 RX ADMIN — LORAZEPAM 1 MG: 2 INJECTION INTRAMUSCULAR; INTRAVENOUS at 10:44

## 2018-07-08 RX ADMIN — TIOTROPIUM BROMIDE 18 MCG: 18 CAPSULE ORAL; RESPIRATORY (INHALATION) at 09:23

## 2018-07-08 RX ADMIN — FENTANYL CITRATE 50 MCG: 50 INJECTION INTRAMUSCULAR; INTRAVENOUS at 20:17

## 2018-07-08 RX ADMIN — HEPARIN SODIUM 5000 UNITS: 5000 INJECTION, SOLUTION INTRAVENOUS; SUBCUTANEOUS at 09:10

## 2018-07-08 RX ADMIN — INSULIN HUMAN 10 UNITS: 100 INJECTION, SOLUTION PARENTERAL at 03:14

## 2018-07-08 RX ADMIN — METRONIDAZOLE 500 MG: 500 INJECTION, SOLUTION INTRAVENOUS at 22:17

## 2018-07-08 RX ADMIN — ISODIUM CHLORIDE 3 ML: 0.03 SOLUTION RESPIRATORY (INHALATION) at 19:29

## 2018-07-08 RX ADMIN — NYSTATIN 1 APPLICATION: 100000 POWDER TOPICAL at 18:15

## 2018-07-08 RX ADMIN — DEXMEDETOMIDINE HYDROCHLORIDE 0.5 MCG/KG/HR: 100 INJECTION, SOLUTION INTRAVENOUS at 22:55

## 2018-07-08 RX ADMIN — DEXTROSE MONOHYDRATE 50 ML: 25 INJECTION, SOLUTION INTRAVENOUS at 03:13

## 2018-07-08 RX ADMIN — LEVALBUTEROL HYDROCHLORIDE 1.25 MG: 1.25 SOLUTION, CONCENTRATE RESPIRATORY (INHALATION) at 19:29

## 2018-07-08 NOTE — ED PROCEDURE NOTE
PROCEDURE  CriticalCare Time  Performed by: Jayden Murray  Authorized by: Jayden Murray     Critical care provider statement:     Critical care time (minutes):  31    Critical care start time:  7/8/2018 2:14 AM    Critical care end time:  7/8/2018 2:44 AM    Critical care time was exclusive of:  Separately billable procedures and treating other patients and teaching time    Critical care was necessary to treat or prevent imminent or life-threatening deterioration of the following conditions:  Shock    Critical care was time spent personally by me on the following activities:  Blood draw for specimens, obtaining history from patient or surrogate, development of treatment plan with patient or surrogate, discussions with consultants, evaluation of patient's response to treatment, examination of patient, ordering and performing treatments and interventions, ordering and review of laboratory studies, ordering and review of radiographic studies, re-evaluation of patient's condition and review of old charts  Comments:      Plan to administer normal saline to treat hypotension; plan to administer calcium gluconate to treat hyperkalemia           Adiel Harmon MD  07/08/18 0025

## 2018-07-08 NOTE — SEPSIS NOTE
Sepsis Note   Verito Martinez 66 y o  male MRN: [id number]  Unit/Bed#: ED 01 Encounter: [id number]            Initial Sepsis Screening     Row Name 07/08/18 0522                Is the patient's history suggestive of a new or worsening infection? (!)  Yes (Proceed)  -DS        Suspected source of infection acute abdominal infection  -DS        Are two or more of the following signs & symptoms of infection both present and new to the patient? (!)  Yes (Proceed)  -DS        Indicate SIRS criteria Hypothermia < 36C (96 8F); Leukocytosis (WBC > 22854 IJL)  -DS        If the answer is yes to both questions, suspicion of sepsis is present          If severe sepsis is present AND tissue hypoperfusion perists in the hour after fluid resuscitation or lactate > 4, the patient meets criteria for SEPTIC SHOCK          Are any of the following organ dysfunction criteria present within 6 hours of suspected infection and SIRS criteria that are NOT considered to be chronic conditions? (!)  Yes  -DS        Organ dysfunction Creatinine > 2 0 mg/dL;SBP decrease > 40 mmHg from baseline;SBP < 90 mmHg  -DS        Date of presentation of severe sepsis 07/08/18  -DS        Time of presentation of severe sepsis 0522  -DS        Tissue hypoperfusion persists in the hour after crystalloid fluid administration, evidenced, by either: SBP < 90 mm/Hg ( ___ mm/Hg in comment field)  -DS        Was hypotension present within one hour of the conclusion of crystalloid fluid administration?  Yes  -DS        Date of presentation of septic shock 07/08/18  -DS        Time of presentation of septic shock 0522  -DS          User Key  (r) = Recorded By, (t) = Taken By, (c) = Cosigned By    [address] Name Provider Type    DO Aaron Zamora

## 2018-07-08 NOTE — PROCEDURES
Central Line Insertion  Date/Time: 7/8/2018 10:05 AM  Performed by: Bill Osorio  Authorized by: Bill Osorio     Patient location:  Bedside  Consent:     Consent obtained:  Verbal and written    Consent given by:  Patient    Risks discussed:  Infection, bleeding and pneumothorax  Universal protocol:     Patient identity confirmed:  Verbally with patient and arm band  Pre-procedure details:     Hand hygiene: Hand hygiene performed prior to insertion      Sterile barrier technique: All elements of maximal sterile technique followed      Skin preparation:  ChloraPrep    Skin preparation agent: Skin preparation agent completely dried prior to procedure    Indications:     Central line indications: medications requiring central line    Sedation:     Sedation type: Anxiolysis  Anesthesia (see MAR for exact dosages): Anesthesia method:  Local infiltration    Local anesthetic:  Lidocaine 1% w/o epi  Procedure details:     Location:  Right internal jugular    Vessel type: vein      Laterality:  Right    Patient position:  Flat    Catheter type:  Triple lumen 16cm    Landmarks identified: yes      Ultrasound guidance: yes      Sterile ultrasound techniques: Sterile gel and sterile probe covers were used      Number of attempts:  2    Successful placement: yes    Post-procedure details:     Post-procedure:  Dressing applied and line sutured    Assessment:  Blood return through all ports, free fluid flow, no pneumothorax on x-ray and placement verified by x-ray    Post-procedure complications: none      Patient tolerance of procedure:   Tolerated well, no immediate complications

## 2018-07-08 NOTE — PROCEDURES
Arterial Line Insertion  Date/Time: 7/8/2018 10:07 AM  Performed by: Elver Ventura  Authorized by: Elver Ventura     Patient location:  Bedside  Consent:     Consent obtained:  Verbal    Consent given by:  Patient    Risks discussed:  Bleeding and infection  Universal protocol:     Patient identity confirmed:  Verbally with patient and arm band  Indications:     Indications: hemodynamic monitoring and continuous blood pressure monitoring    Pre-procedure details:     Skin preparation:  Chlorhexidine    Preparation: Patient was prepped and draped in sterile fashion    Anesthesia (see MAR for exact dosages): Anesthesia method:  Local infiltration    Local anesthetic:  Lidocaine 1% w/o epi  Procedure details:     Location / Tip of Catheter:  Radial    Laterality:  Right    Needle gauge:  20 G    Placement technique:  Ultrasound guided    Number of attempts:  1    Successful placement: yes      Transducer: waveform confirmed    Post-procedure details:     Post-procedure:  Biopatch applied, sutured and sterile dressing applied    Patient tolerance of procedure:   Tolerated well, no immediate complications

## 2018-07-08 NOTE — SEPSIS NOTE
On Levophed  Received 3 L IV fluid  Sepsis Note   Amanda Bravo 66 y o  male MRN: [id number]  Unit/Bed#: ED 01 Encounter: [id number]            Initial Sepsis Screening     Row Name 07/08/18 0522                Is the patient's history suggestive of a new or worsening infection? (!)  Yes (Proceed)  -DS        Suspected source of infection acute abdominal infection  -DS        Are two or more of the following signs & symptoms of infection both present and new to the patient? (!)  Yes (Proceed)  -DS        Indicate SIRS criteria Hypothermia < 36C (96 8F); Leukocytosis (WBC > 39553 IJL)  -DS        If the answer is yes to both questions, suspicion of sepsis is present          If severe sepsis is present AND tissue hypoperfusion perists in the hour after fluid resuscitation or lactate > 4, the patient meets criteria for SEPTIC SHOCK          Are any of the following organ dysfunction criteria present within 6 hours of suspected infection and SIRS criteria that are NOT considered to be chronic conditions? (!)  Yes  -DS        Organ dysfunction Creatinine > 2 0 mg/dL;SBP decrease > 40 mmHg from baseline;SBP < 90 mmHg  -DS        Date of presentation of severe sepsis 07/08/18  -DS        Time of presentation of severe sepsis 0522  -DS        Tissue hypoperfusion persists in the hour after crystalloid fluid administration, evidenced, by either: SBP < 90 mm/Hg ( ___ mm/Hg in comment field)  -DS        Was hypotension present within one hour of the conclusion of crystalloid fluid administration?  Yes  -DS        Date of presentation of septic shock 07/08/18  -DS        Time of presentation of septic shock 0522  -DS          User Key  (r) = Recorded By, (t) = Taken By, (c) = Cosigned By    [address] Name Provider Type    PAXTON Sanchez DO Resident               Default Flowsheet Data (last 720 hours)      Sepsis Reassess     Row Name 07/08/18 0523                   Repeat Volume Status and Tissue Perfusion Assessment Performed    Repeat Volume Status and Tissue Perfusion Assessment Performed Yes  -DS           Volume Status and Tissue Perfusion Post Fluid Resuscitation- Must Document 5 of the Following 7:    Vital Signs Reviewed (HR, RR, BP, T) Yes  -DS        Arterial Oxygen Saturation Reviewed (POx, SaO2 or SpO2)          Cardio Regular rate and rhythm  -DS        Pulmonary Normal effort;Clear to auscultation  -DS        Capillary Refill Sluggish  -DS        Peripheral Pulses Radial;Posterior Tibialis  -DS        Peripheral Pulse +2  -DS        Posterior Tibialis +2  -DS        Skin Dry; Cool  -DS        Urine output assessed None  -DS           *OR*   Intensive Monitoring- Must Document One of the Following Four *:    Vital Signs Reviewed          * Central Venous Pressure (CVP or RAP)          * Central Venous Oxygen (SVO2, ScvO2 or Oxygen saturation via central catheter)          * Bedside Cardiovascular US in IVC diameter and % collapse          * Passive Leg Raise OR Crystalloid Challenge            User Key  (r) = Recorded By, (t) = Taken By, (c) = Cosigned By    Initials Name Provider Type    DS Vicky Keyes DO Resident

## 2018-07-08 NOTE — ED NOTES
Pt states he would like to wait on ostomy being emptied at this time, primary RN Evelyne Llanos made aware     Jessy Turner RN  07/08/18 9653

## 2018-07-08 NOTE — ED PROVIDER NOTES
History  Chief Complaint   Patient presents with    Abdominal Pain     patient has as abdominal pain     Patient recently here for hypotension  Thought secondary to high ileostomy output at that time  Has history colonic perforation has ileostomy  Was in ICU transiently had left upper quadrant abscess; drained by IR  Patient states he has felt lightheaded; increasing abdominal pain started tonight at 10:00 p m  Dg Ayala 2-3 times changes ileostomy bag today; which he states is normal  States pees 2-3 times per day; which is normal  Notes slightly decreased p o  intake  Denies chest pain; shortness of breath  Does have diffuse abdominal pain  Denies fevers; or chills  Has nausea; without vomiting  Denies any falls accidents; or trauma  Arrived by EMS  Found to be hypotensive with systolic pressure in the 62O  Given aggressive fluid hydration/resuscitation  Given 3 L IV fluid in total   Found to be in acute renal failure with possible QRS changes on EKG  Given treatment for hyperkalemia  Patient also found to have leukocytosis  Prior to Admission Medications   Prescriptions Last Dose Informant Patient Reported? Taking?    Nebulizers (AERONEB GO NEBULIZER HANDSET) MISC   No No   Sig: by Does not apply route 2 (two) times a day as needed (wheezing)   Tiotropium Bromide Monohydrate (SPIRIVA RESPIMAT) 2 5 MCG/ACT AERS   No No   Sig: Inhale 1 Act (2 5 mcg total) daily   VENTOLIN  (90 Base) MCG/ACT inhaler   No No   Sig: Inhale 2 puffs every 4 (four) hours as needed for wheezing or shortness of breath   acetaminophen (TYLENOL) 650 mg CR tablet   No No   Sig: Take 1 tablet (650 mg total) by mouth every 8 (eight) hours as needed for mild pain   albuterol (2 5 mg/3 mL) 0 083 % nebulizer solution   No No   Sig: Take 3 mL (2 5 mg total) by nebulization every 4 (four) hours as needed for wheezing as needed for wheezing   amiodarone 200 mg tablet   No No   Sig: Take 1 tablet (200 mg total) by mouth daily aspirin 81 mg chewable tablet   No No   Sig: Chew 1 tablet (81 mg total) daily   atorvastatin (LIPITOR) 40 mg tablet   No No   Sig: Take 1 tablet (40 mg total) by mouth daily with dinner for 30 days   gabapentin (NEURONTIN) 400 mg capsule   No No   Sig: Take 1 capsule (400 mg total) by mouth 3 (three) times a day   lisinopril (ZESTRIL) 2 5 mg tablet   No No   Sig: Take 1 tablet (2 5 mg total) by mouth daily   metoprolol succinate (TOPROL-XL) 50 mg 24 hr tablet   No No   Sig: Take 1 tablet (50 mg total) by mouth daily   nicotine (NICODERM CQ) 7 mg/24hr TD 24 hr patch   No No   Sig: Place 1 patch on the skin every 24 hours   oxyCODONE (ROXICODONE) 10 MG TABS   No No   Sig: Take 1 tablet (10 mg total) by mouth every 4 (four) hours as needed for severe pain for up to 10 days Earliest Fill Date: 6/28/18 Max Daily Amount: 60 mg   vitamin A 10,000 units capsule   No No   Sig: Take 3 capsules (30,000 Units total) by mouth daily      Facility-Administered Medications: None       Past Medical History:   Diagnosis Date    Anxiety     Asthma     Cardiac disease     Chest pain     Colitis     Colon polyps     COPD (chronic obstructive pulmonary disease) (HCC)     Coronary artery disease     Diverticulitis     Esophageal reflux     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HCC)     Hyperlipidemia     Hypertension     IBD (inflammatory bowel disease)     [facility] [address]     Old myocardial infarction     Perforation of colon (HCC)     Type 2 diabetes, diet controlled ([facility] [address] [facility]     [facility]        Past Surgical History:   Procedure Laterality Date    ANGIOPLASTY      COLON SURGERY      COLONOSCOPY N/A 10/24/2016    Procedure: COLONOSCOPY;  Surgeon: Oral Watkins MD;  [facility]: [facility]; Service:     CORONARY ANGIOPLASTY WITH STENT PLACEMENT      ESOPHAGOGASTRODUODENOSCOPY N/A 10/24/2016    Procedure: ESOPHAGOGASTRODUODENOSCOPY (EGD);   Surgeon: Oral Watkins MD;  [facility]; Service:     EXPLORATORY LAPAROTOMY W/ BOWEL RESECTION N/A 5/22/2018    Procedure: EXPLORATORY LAPAROTOMY, ILIOCOLECTOMY, ILIOCOLONIC ANASTAMOSIS, LOOP ILIOSTOMY, REPAIR OF SEROSAL TEAR, EXTENSIVE LYSIS OF ADHESIONS, WOUND VAC PLACEMENT;  Surgeon: Pal Lamb MD;  [facility];  Service: Colorectal    HEMICOLECTOMY      OTHER SURGICAL HISTORY      stent indications acute myocardial infarction    NM COLONOSCOPY FLX DX W/COLLJ SPEC WHEN PFRMD N/A 2/6/2018    Procedure: COLONOSCOPY;  Surgeon: Pal Lamb MD;  [facility]: [facility]; Service: Colorectal    TONSILLECTOMY         Family History   Problem Relation Age of Onset    Coronary artery disease Father     Crohn's disease Father     Stroke Father     Diabetes Family      I have reviewed and agree with the history as documented  Social History   Substance Use Topics    Smoking status: Current Every Day Smoker     Types: Cigars    Smokeless tobacco: Never Used      Comment: 3 cigars per day    Alcohol use Yes      Comment: kenji occassionally, beers        Review of Systems   Constitutional: Negative for activity change, appetite change, chills and fever  HENT: Negative for congestion, rhinorrhea and sore throat  Eyes: Negative for photophobia and pain  Respiratory: Negative for cough, chest tightness and wheezing  Cardiovascular: Negative for chest pain, palpitations and leg swelling  Gastrointestinal: Positive for abdominal pain and nausea  Negative for abdominal distention, constipation, diarrhea and vomiting  Genitourinary: Negative for dysuria, flank pain, frequency and hematuria  Musculoskeletal: Negative for arthralgias, back pain, myalgias, neck pain and neck stiffness  Skin: Negative for color change and rash  Neurological: Positive for weakness and light-headedness  Negative for seizures, speech difficulty and headaches  Hematological: Negative for adenopathy     Psychiatric/Behavioral: Negative for agitation, confusion, hallucinations and suicidal ideas  Physical Exam  ED Triage Vitals   Temperature Pulse Respirations Blood Pressure SpO2   07/08/18 0307 07/08/18 0226 07/08/18 0226 07/08/18 0226 07/08/18 0226   (!) 96 1 °F (35 6 °C) 86 14 (!) 51/26 93 %      Temp Source Heart Rate Source Patient Position - Orthostatic VS BP Location FiO2 (%)   07/08/18 0307 07/08/18 0226 07/08/18 0226 07/08/18 0226 --   Temporal Monitor Lying Right arm       Pain Score       07/08/18 0226       Worst Possible Pain           Orthostatic Vital Signs  Vitals:    07/08/18 0420 07/08/18 0430 07/08/18 0510 07/08/18 0530   BP: 92/58 (!) 85/48 93/57 (!) 74/50   Pulse: 93 86 84 90   Patient Position - Orthostatic VS:  Lying  Lying       Physical Exam   Constitutional: He is oriented to person, place, and time  No distress  Cachectic individual   Although hypotensive did not appear distressed   HENT:   Head: Normocephalic and atraumatic  Mouth/Throat: No oropharyngeal exudate  Dry oral mucosa   Eyes: EOM are normal  Pupils are equal, round, and reactive to light  Right eye exhibits no discharge  Left eye exhibits no discharge  Neck: Normal range of motion  Neck supple  No JVD present  No tracheal deviation present  Full range of motion of neck   Cardiovascular: Normal rate and normal heart sounds  No murmur heard  Pulmonary/Chest: Effort normal and breath sounds normal  No respiratory distress  He has no wheezes  He has no rales  No increased work of breathing   Abdominal: Soft  Bowel sounds are normal  He exhibits no distension  There is tenderness  There is guarding  There is no rebound  Tender abdomen throughout  Is not truly peritoneal   Is very tender to mild palpation all quadrants  Ostomy bag with dark output does not appear to be blood appears more bilious  SANTA drain with minimal drainage  Midline dressing without drainage   Musculoskeletal: Normal range of motion   He exhibits no edema, tenderness or deformity  Lymphadenopathy:     He has no cervical adenopathy  Neurological: He is alert and oriented to person, place, and time  No cranial nerve deficit  He exhibits normal muscle tone  Moves extremities x4  Slow to answer questions but answers all appropriately  Skin: Skin is warm and dry  Capillary refill takes less than 2 seconds  No erythema  Psychiatric: He has a normal mood and affect   His behavior is normal  Judgment and thought content normal        ED Medications  Medications   norepinephrine (LEVOPHED) 4 mg (STANDARD CONCENTRATION) IV in sodium chloride 0 9% 250 mL (10 mcg/min Intravenous New Bag 7/8/18 0609)   norepinephrine (LEVOPHED) 1 mg/mL injection **AcuDose Override Pull** (not administered)   vancomycin (VANCOCIN) IVPB (premix) 1,000 mg (not administered)   sodium chloride 0 9 % bolus 1,000 mL (not administered)   heparin (porcine) subcutaneous injection 5,000 Units (not administered)   folic acid 1 mg, thiamine (VITAMIN B1) 100 mg in sodium chloride 0 9 % 50 mL IVPB (not administered)   vasopressin (PITRESSIN) 20 Units in sodium chloride 0 9 % 100 mL infusion (not administered)   fentanyl citrate (PF) 100 MCG/2ML 50 mcg (not administered)   tiotropium (SPIRIVA) capsule for inhaler 18 mcg (not administered)   nicotine (NICODERM CQ) 7 mg/24hr TD 24 hr patch 7 mg (not administered)   ipratropium (ATROVENT) 0 02 % inhalation solution 0 5 mg (not administered)   albuterol inhalation solution 5 mg (not administered)   cefepime (MAXIPIME) 2,000 mg in dextrose 5 % 50 mL IVPB (not administered)   metroNIDAZOLE (FLAGYL) IVPB (premix) 500 mg (not administered)   nystatin (MYCOSTATIN) powder (not administered)   sodium chloride 0 9 % bolus 1,000 mL (0 mL Intravenous Stopped 7/8/18 0402)   sodium chloride 0 9 % bolus 1,000 mL (0 mL Intravenous Stopped 7/8/18 0403)   calcium gluconate 2 g in sodium chloride 0 9 % 100 mL IVPB (0 g Intravenous Stopped 7/8/18 0445)   sodium bicarbonate 8 4 % injection 50 mEq (50 mEq Intravenous Given 7/8/18 0318)   insulin regular (HumuLIN R,NovoLIN R) injection 10 Units (10 Units Intravenous Given 7/8/18 0314)   dextrose 50 % IV solution 50 mL (50 mL Intravenous Given 7/8/18 0313)   cefepime (MAXIPIME) 2 g/50 mL dextrose IVPB (0 mg Intravenous Stopped 7/8/18 0522)   metroNIDAZOLE (FLAGYL) IVPB (premix) 500 mg (500 mg Intravenous New Bag 7/8/18 0530)   fentanyl citrate (PF) 100 MCG/2ML 50 mcg (50 mcg Intravenous Given 7/8/18 0525)       Diagnostic Studies  Results Reviewed     Procedure Component Value Units Date/Time    Urine Microscopic [[id number]] Collected:  07/08/18 0553    Lab Status:   In process Specimen:  Urine from Urine, Clean Catch Updated:  07/08/18 0549    ED Urine Macroscopic [[id number]]  (Abnormal) Collected:  07/08/18 0553    Lab Status:  Final result Specimen:  Urine Updated:  07/08/18 0544     Color, UA Yellow     Clarity, UA Clear     pH, UA 5 5     Leukocytes, UA Large (A)     Nitrite, UA Negative     Protein, UA 30 (1+) (A) mg/dl      Glucose, UA Negative mg/dl      Ketones, UA Negative mg/dl      Urobilinogen, UA 0 2 E U /dl      Bilirubin, UA Interference- unable to analyze (A)     Blood, UA Small (A)     Specific [place], UA 1 010    Narrative:       CLINITEK RESULT    Basic metabolic panel [[id number]]     Lab Status:  No result Specimen:  Blood     Comprehensive metabolic panel [[id number]]  (Abnormal) Collected:  07/08/18 0233    Lab Status:  Final result Specimen:  Blood from Arm, Right Updated:  07/08/18 0407     Sodium 115 (L) mmol/L      Potassium 5 9 (H) mmol/L      Chloride 81 (L) mmol/L      CO2 22 mmol/L      Anion Gap 12 mmol/L      BUN 60 (H) mg/dL      Creatinine 5 46 (H) mg/dL      Glucose 97 mg/dL      Calcium 8 7 mg/dL      AST 21 U/L      ALT 12 U/L      Alkaline Phosphatase 132 (H) U/L      Total Protein 8 8 (H) g/dL      Albumin 2 7 (L) g/dL      Total Bilirubin 0 47 mg/dL      eGFR 11 ml/min/1 73sq m     Narrative:         National Kidney Disease Education Program recommendations are as follows:  GFR calculation is accurate only with a steady state creatinine  Chronic Kidney disease less than 60 ml/min/1 73 sq  meters  Kidney failure less than 15 ml/min/1 73 sq  meters  POCT Chem 8+ [[id number]]  (Abnormal) Collected:  07/08/18 0350    Lab Status:  Final result Updated:  07/08/18 0400     SODIUM, I-STAT 121 (L) mmol/l      Potassium, i-STAT 4 9 mmol/L      Chloride, istat 88 (LL) mmol/L      CO2, i-STAT 21 mmol/L      Anion Gap, Istat 18 (H) mmol/L      Calcium, Ionized i-STAT 0 97 (L) mmol/L      BUN, I-STAT 64 (H) mg/dl      Creatinine, i-STAT 5 6 (H) mg/dl      eGFR 10 ml/min/1 73sq m      Glucose, i-STAT 92 mg/dl      Hct, i-STAT 26 (L) %      Hgb, i-STAT 8 8 (L) g/dl      Specimen Type VENOUS    CBC and differential [[id number]]  (Abnormal) Collected:  07/08/18 0233    Lab Status:  Final result Specimen:  Blood from Arm, Right Updated:  07/08/18 0330     WBC 22 37 (H) Thousand/uL      RBC 3 58 (L) Million/uL      Hemoglobin 10 5 (L) g/dL      Hematocrit 31 8 (L) %      MCV 89 fL      MCH 29 3 pg      MCHC 33 0 g/dL      RDW 16 6 (H) %      MPV 10 6 fL      Platelets 101 (H) Thousands/uL      nRBC 0 /100 WBCs     Narrative: This is an appended report  These results have been appended to a previously verified report  POCT urinalysis dipstick [[id number]]     Lab Status:  No result     Procalcitonin [[id number]]  (Abnormal) Collected:  07/08/18 0233    Lab Status:  Final result Specimen:  Blood from Arm, Right Updated:  07/08/18 0313     Procalcitonin 1 12 (H) ng/ml     Lactic acid, plasma [[id number]]  (Normal) Collected:  07/08/18 0233    Lab Status:  Final result Specimen:  Blood from Arm, Right Updated:  07/08/18 0303     LACTIC ACID 1 2 mmol/L     Narrative:         Result may be elevated if tourniquet was used during collection      APTT [[id number]]  (Normal) Collected:  07/08/18 0233    Lab Status:  Final result Specimen:  Blood from Arm, Right Updated:  07/08/18 0256     PTT 27 seconds     Protime-INR [[id number]]  (Normal) Collected:  07/08/18 0233    Lab Status:  Final result Specimen:  Blood from Arm, Right Updated:  07/08/18 0256     Protime 12 9 seconds      INR 0 96    Blood culture #2 [[id number]] Collected:  07/08/18 0234    Lab Status: In process Specimen:  Blood from Arm, Left Updated:  07/08/18 0239    Blood culture #1 [[id number]] Collected:  07/08/18 0234    Lab Status: In process Specimen:  Blood from Arm, Right Updated:  07/08/18 0239    POCT Chem 8+ [[id number]]  (Abnormal) Collected:  07/08/18 0234    Lab Status:  Final result Updated:  07/08/18 0238     SODIUM, I-STAT 116 (L) mmol/l      Potassium, i-STAT 6 9 (HH) mmol/L      Chloride, istat 83 (LL) mmol/L      CO2, i-STAT 24 mmol/L      Anion Gap, Istat 17 (H) mmol/L      Calcium, Ionized i-STAT 0 93 (L) mmol/L      BUN, I-STAT 82 (H) mg/dl      Creatinine, i-STAT 6 1 (H) mg/dl      eGFR 9 ml/min/1 73sq m      Glucose, i-STAT 104 mg/dl      Hct, i-STAT 34 (L) %      Hgb, i-STAT 11 6 (L) g/dl      Specimen Type VENOUS    POCT Blood Gas (CG8+) [[id number]]  (Abnormal) Collected:  07/08/18 0234    Lab Status:  Final result Updated:  07/08/18 0238     ph, Ravindra ISTAT 7 448 (H)     pCO2, Ravindra i-STAT 35 3 (L) mm HG      pO2, Ravindra i-STAT 17 0 (L) mm HG      BE, i-STAT 1 mmol/L      HCO3, Ravindra i-STAT 24 4 mmol/L      CO2, i-STAT 26 mmol/L      O2 Sat, i-STAT 26 (L) %      SODIUM, I-STAT 117 (L) mmol/l      Potassium, i-STAT 6 9 (HH) mmol/L      Calcium, Ionized i-STAT 0 93 (L) mmol/L      Hct, i-STAT 34 (L) %      Hgb, i-STAT 11 6 (L) g/dl      Glucose, i-STAT 103 mg/dl      Specimen Type VENOUS                 CT abdomen pelvis wo contrast   Final Result by Elizabeth Lozada MD (07/08 4705)      1  Irregular tract containing gas adjacent to the ileocolic anastomosis has decreased in size however persistent sinus tract/leak is not excluded     2   Collection associated with the left-sided pigtail catheter is nearly resolved  3   Persistent mesenteric edema and fluid with loculated sub-3 cm collections likely involving the inferior tip of the spleen, near the aortic bifurcation, and in the right hemiabdomen are stable and may represent abscesses  Workstation performed: [id number]         XR chest 1 view portable   ED Interpretation by Doug Falk DO (07/08 0244)   Wet read Portable chest x-ray no evidence of free air and upper quadrants  No evidence of acute parenchymal disease            Procedures  Procedures      Phone Consults  ED Phone Contact    ED Course  ED Course as of Jul 08 0712   Rodolfo Bermudez Jul 08, 2018   3475 Creatinine: (!) 5 46   5356 CT abdomen pelvis wo contrast                         Initial Sepsis Screening     Row Name 07/08/18 0522                Is the patient's history suggestive of a new or worsening infection? (!)  Yes (Proceed)  -DS        Suspected source of infection acute abdominal infection  -DS        Are two or more of the following signs & symptoms of infection both present and new to the patient? (!)  Yes (Proceed)  -DS        Indicate SIRS criteria Hypothermia < 36C (96 8F); Leukocytosis (WBC > 82990 IJL)  -DS        If the answer is yes to both questions, suspicion of sepsis is present          If severe sepsis is present AND tissue hypoperfusion perists in the hour after fluid resuscitation or lactate > 4, the patient meets criteria for SEPTIC SHOCK          Are any of the following organ dysfunction criteria present within 6 hours of suspected infection and SIRS criteria that are NOT considered to be chronic conditions?  (!)  Yes  -DS        Organ dysfunction Creatinine > 2 0 mg/dL;SBP decrease > 40 mmHg from baseline;SBP < 90 mmHg  -DS        Date of presentation of severe sepsis 07/08/18  -DS        Time of presentation of severe sepsis 0522  -DS        Tissue hypoperfusion persists in the hour after crystalloid fluid administration, evidenced, by either: SBP < 90 mm/Hg ( ___ mm/Hg in comment field)  -DS        Was hypotension present within one hour of the conclusion of crystalloid fluid administration? Yes  -DS        Date of presentation of septic shock 07/08/18  -DS        Time of presentation of septic shock 0522  -DS          User Key  (r) = Recorded By, (t) = Taken By, (c) = Cosigned By    [address] Name Provider Type    DS Caitlin Devine DO Resident           Default Flowsheet Data (last 720 hours)      Sepsis Reassess     Row Name 07/08/18 0523                   Repeat Volume Status and Tissue Perfusion Assessment Performed    Repeat Volume Status and Tissue Perfusion Assessment Performed Yes  -DS           Volume Status and Tissue Perfusion Post Fluid Resuscitation- Must Document 5 of the Following 7:    Vital Signs Reviewed (HR, RR, BP, T) Yes  -DS        Arterial Oxygen Saturation Reviewed (POx, SaO2 or SpO2)          Cardio Regular rate and rhythm  -DS        Pulmonary Normal effort;Clear to auscultation  -DS        Capillary Refill Sluggish  -DS        Peripheral Pulses Radial;Posterior Tibialis  -DS        Peripheral Pulse +2  -DS        Posterior Tibialis +2  -DS        Skin Dry; Cool  -DS        Urine output assessed None  -DS           *OR*   Intensive Monitoring- Must Document One of the Following Four *:    Vital Signs Reviewed          * Central Venous Pressure (CVP or RAP)          * Central Venous Oxygen (SVO2, ScvO2 or Oxygen saturation via central catheter)          * Bedside Cardiovascular US in IVC diameter and % collapse          * Passive Leg Raise OR Crystalloid Challenge            User Key  (r) = Recorded By, (t) = Taken By, (c) = Cosigned By    Initials Name Provider Type    DS Caitlin Devine DO Resident                MDM  Number of Diagnoses or Management Options  Acute renal failure (ARF) ([facility] [address] ): Hyperkalemia:   Hypotension:   Hypovolemic shock [facility]):   Diagnosis management comments: Patient came in systolic blood pressure in the 50s  Received 3 L IV fluid only transient response  Started on Levophed titrated as high as 15 mcg but then titrated back down to 10 to keep maps of 65  At had acute renal failure  EKG may have showed slight QRS widening from prior therefore bicarb/insulin/dextrose/calcium were given  Patient had maps around 72 when leaving department  Did discuss with surgical critical care and admitted to ICU  Patient was hemodynamically stable at the time he want to ICU  Was mentating well  The received 3 L IV fluid held off on additional as hyponatremia and put in for repeat BMP as did not want to raise sodium too quickly  CT abdomen pelvis done without contrast due to acute renal failure  Showed possible areas of intra-abdominal abscess  Given broad-spectrum antibiotics  CritCare Time    Disposition  Final diagnoses:   Hypotension   Acute renal failure (ARF) (HCC)   Hypovolemic shock (HCC)   Hyperkalemia     Time reflects when diagnosis was documented in both MDM as applicable and the Disposition within this note     Time User Action Codes Description Comment    7/8/2018  4:19 AM Lauro Beams Add [I95 9] Hypotension     7/8/2018  4:19 AM [place] Beams Add [N17 9] Acute renal failure (ARF) ([address] )     7/8/2018  4:19 AM [place] Beams Add [R57 1] Hypovolemic shock ([address] )     7/8/2018  4:19 AM [place] Beams Add [E87 5] Hyperkalemia       ED Disposition     ED Disposition Condition Comment    Admit  Case was discussed with Dr Zay Aragon and the patient's admission status was agreed to be Admission Status: inpatient status to the service of Dr Luis Hager           Follow-up Information    None         Current Discharge Medication List      CONTINUE these medications which have NOT CHANGED    Details   acetaminophen (TYLENOL) 650 mg CR tablet Take 1 tablet (650 mg total) by mouth every 8 (eight) hours as needed for mild pain  Qty: 90 tablet, Refills: 0    Associated Diagnoses: Colitis      albuterol (2 5 mg/3 mL) 0 083 % nebulizer solution Take 3 mL (2 5 mg total) by nebulization every 4 (four) hours as needed for wheezing as needed for wheezing  Qty: 150 mL, Refills: 0    Associated Diagnoses: COPD (chronic obstructive pulmonary disease) (HCC)      amiodarone 200 mg tablet Take 1 tablet (200 mg total) by mouth daily  Qty: 30 tablet, Refills: 0    Associated Diagnoses: Atrial fibrillation with RVR (HCC)      aspirin 81 mg chewable tablet Chew 1 tablet (81 mg total) daily  Qty: 30 tablet, Refills: 0    Associated Diagnoses: Coronary artery disease involving native coronary artery of native heart without angina pectoris      atorvastatin (LIPITOR) 40 mg tablet Take 1 tablet (40 mg total) by mouth daily with dinner for 30 days  Qty: 30 tablet, Refills: 0    Associated Diagnoses: Coronary artery disease involving native coronary artery of native heart without angina pectoris      gabapentin (NEURONTIN) 400 mg capsule Take 1 capsule (400 mg total) by mouth 3 (three) times a day  Qty: 90 capsule, Refills: 0    Associated Diagnoses: Cervical radiculopathy      lisinopril (ZESTRIL) 2 5 mg tablet Take 1 tablet (2 5 mg total) by mouth daily  Qty: 30 tablet, Refills: 0    Associated Diagnoses: Essential hypertension      metoprolol succinate (TOPROL-XL) 50 mg 24 hr tablet Take 1 tablet (50 mg total) by mouth daily  Qty: 30 tablet, Refills: 0    Associated Diagnoses: Atrial fibrillation with RVR (HCC)      Nebulizers (AERONEB GO NEBULIZER HANDSET) MISC by Does not apply route 2 (two) times a day as needed (wheezing)  Qty: 1 each, Refills: 0    Associated Diagnoses: Uncomplicated asthma, unspecified asthma severity, unspecified whether persistent      nicotine (NICODERM CQ) 7 mg/24hr TD 24 hr patch Place 1 patch on the skin every 24 hours  Qty: 28 patch, Refills: 0    Associated Diagnoses: Nicotine dependence      oxyCODONE (ROXICODONE) 10 MG TABS Take 1 tablet (10 mg total) by mouth every 4 (four) hours as needed for severe pain for up to 10 days Earliest Fill Date: 6/28/18 Max Daily Amount: 60 mg  Qty: 20 tablet, Refills: 0    Associated Diagnoses: Ileostomy in place ([facility] [address] )      Tiotropium Bromide Monohydrate (SPIRIVA RESPIMAT) 2 5 MCG/ACT AERS Inhale 1 Act (2 5 mcg total) daily  Qty: 1 Inhaler, Refills: 0    Associated Diagnoses: COPD with exacerbation (HCC)      VENTOLIN  (90 Base) MCG/ACT inhaler Inhale 2 puffs every 4 (four) hours as needed for wheezing or shortness of breath  Qty: 1 Inhaler, Refills: 0    Associated Diagnoses: COPD (chronic obstructive pulmonary disease) (HCC)      vitamin A 10,000 units capsule Take 3 capsules (30,000 Units total) by mouth daily  Qty: 10 capsule, Refills: 0    Associated Diagnoses: Colitis           No discharge procedures on file  ED Provider  Attending physically available and evaluated Radha Kirkland I managed the patient along with the ED Attending      Electronically Signed by         Buffy Coelho DO  07/08/18 0203

## 2018-07-08 NOTE — H&P
History and Physical - Colorectal Surgery   Cesar Stevenson 66 y o  male MRN: [id number]  Unit/Bed#: ICU 11 Encounter: [id number]    Assessment/Plan     Assessment:  58M w/history of perforated transverse colon s/p EDITH, ileocolonic resection, diverting ileostomy in 5/2018, c/b intra-abdominal abscess s/p IR drain placement 6/26/18  Now presenting with nausea and abdominal pain, found to be hypotensive and in acute renal failure on evaluation in the ED  Plan:  - agree with care by SICU team  - NPO  - cause of shock may be secondary to hypovolemic shock 2/2 known high output ostomy rather than septic shock  - aggressive IVF resuscitation  - cefepime/flagyl  - check endpoints  - wean levophed for MAPs > 65  - continue IR drain to suction      History of Present Illness     HPI:  Cesar Stevenson is a 66 y o  male who presents w/nausea and abdominal pain  Patient is known to this service s/p R hemicolectomy 2005, then with subsequent transverse colon perforation s/p EDITH, ileocolonic anastomosis, diverting ileostomy 9/0050, complicated by intra-abdominal abscess s/p IR drain placement 6/26/18  He has had prior issues with a high output ostomy  Yesterday he started having nausea and decreased PO intake, then had diffuse abdominal pain starting at 10pm  He states he had decreased ostomy output and decreased urine output yesterday as well  Denies fever/chills, chest pain, SOB  Consults    Review of Systems   Constitutional: Negative for chills and fever  HENT: Negative  Eyes: Negative  Respiratory: Negative for shortness of breath  Cardiovascular: Negative for chest pain and palpitations  Gastrointestinal: Positive for abdominal pain and nausea  Negative for blood in stool and vomiting  Endocrine: Negative  Genitourinary: Positive for decreased urine volume  Musculoskeletal: Negative  Skin: Negative  Allergic/Immunologic: Negative  Neurological: Positive for weakness   Negative for light-headedness and headaches  Hematological: Negative  Psychiatric/Behavioral: Negative  Historical Information   Past Medical History:   Diagnosis Date    Anxiety     Asthma     Cardiac disease     Chest pain     Colitis     Colon polyps     COPD (chronic obstructive pulmonary disease) (HCC)     Coronary artery disease     Diverticulitis     Esophageal reflux     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HCC)     Hyperlipidemia     Hypertension     IBD (inflammatory bowel disease)     [facility] )     Old myocardial infarction     Perforation of colon (HCC)     Type 2 diabetes, diet controlled ([facility] )     Ulcerative colitis ([facility] )      Past Surgical History:   Procedure Laterality Date    ANGIOPLASTY      COLON SURGERY      COLONOSCOPY N/A 10/24/2016    Procedure: COLONOSCOPY;  Surgeon: Benjamin Thompson MD;  [facility]; Service:     CORONARY ANGIOPLASTY WITH STENT PLACEMENT      ESOPHAGOGASTRODUODENOSCOPY N/A 10/24/2016    Procedure: ESOPHAGOGASTRODUODENOSCOPY (EGD); Surgeon: Benjamin Thompson MD;  [facility]; Service:     EXPLORATORY LAPAROTOMY W/ BOWEL RESECTION N/A 5/22/2018    Procedure: EXPLORATORY LAPAROTOMY, ILIOCOLECTOMY, ILIOCOLONIC ANASTAMOSIS, LOOP ILIOSTOMY, REPAIR OF SEROSAL TEAR, EXTENSIVE LYSIS OF ADHESIONS, WOUND VAC PLACEMENT;  Surgeon: Benjamin Thompson MD;  [facility];  Service: Colorectal    HEMICOLECTOMY      OTHER SURGICAL HISTORY      stent indications acute myocardial infarction    OR COLONOSCOPY FLX DX W/COLLJ SPEC WHEN PFRMD N/A 2/6/2018    Procedure: COLONOSCOPY;  Surgeon: Benjamin Thompson MD;  [facility];   Service: Colorectal    TONSILLECTOMY       Social History   History   Alcohol Use    Yes     Comment: kenji occassionally, beers     History   Drug Use No     History   Smoking Status    Current Every Day Smoker    Types: Cigars   Smokeless Tobacco    Never Used     Comment: 3 cigars per day Family History: non-contributory    Meds/Allergies   all current active meds have been reviewed  Allergies   Allergen Reactions    Other      Brown cloth band aids       Latex Rash       Objective   First Vitals:   Blood Pressure: (!) 51/26 (07/08/18 0226)  Pulse: 86 (07/08/18 0226)  Temperature: (!) 96 1 °F (35 6 °C) (07/08/18 0307)  Temp Source: Temporal (07/08/18 0307)  Respirations: 14 (07/08/18 0226)  SpO2: 93 % (07/08/18 0226)    Current Vitals:   Blood Pressure: (!) 74/50 (07/08/18 0530)  Pulse: 90 (07/08/18 0530)  Temperature: (!) 96 1 °F (35 6 °C) (07/08/18 0307)  Temp Source: Temporal (07/08/18 0307)  Respirations: 20 (07/08/18 0530)  SpO2: 93 % (07/08/18 0530)      Intake/Output Summary (Last 24 hours) at 07/08/18 0649  Last data filed at 07/08/18 0403   Gross per 24 hour   Intake             2000 ml   Output                0 ml   Net             2000 ml       Invasive Devices     Peripheral Intravenous Line            Peripheral IV 07/08/18 Left Hand less than 1 day    Peripheral IV 07/08/18 Left Hand less than 1 day    Peripheral IV 07/08/18 Right Antecubital less than 1 day          Drain            Colostomy  RUQ 46 days    Closed/Suction Drain Left Abdomen Bulb 8 5 Fr  10 days                Physical Exam   Constitutional: He is oriented to person, place, and time  He appears well-developed and well-nourished  No distress  HENT:   Head: Normocephalic and atraumatic  Eyes: EOM are normal  Pupils are equal, round, and reactive to light  No scleral icterus  Neck: No JVD present  Cardiovascular: Normal rate, regular rhythm and normal heart sounds  Pulmonary/Chest: Effort normal and breath sounds normal  No stridor  No respiratory distress  Abdominal: Soft  He exhibits distension (mild)  There is tenderness (non focal)  There is no rebound and no guarding     No peritonitis   Ileostomy in place with very liquidy bilious output, stoma pink and healthy   Musculoskeletal: He exhibits no edema  Neurological: He is alert and oriented to person, place, and time  No cranial nerve deficit  Skin: Skin is warm and dry  He is not diaphoretic  Psychiatric: He has a normal mood and affect  Lab Results:   CBC:   Lab Results   Component Value Date    WBC 22 37 (H) 07/08/2018    HGB 8 8 (L) 07/08/2018    HCT 31 8 (L) 07/08/2018    MCV 89 07/08/2018     (H) 07/08/2018    MCH 29 3 07/08/2018    MCHC 33 0 07/08/2018    RDW 16 6 (H) 07/08/2018    MPV 10 4 07/08/2018    NRBC 0 07/08/2018   , CMP:   Lab Results   Component Value Date     (L) 07/08/2018    K 4 8 07/08/2018    CL 94 (L) 07/08/2018    CO2 20 (L) 07/08/2018    ANIONGAP 11 07/08/2018    BUN 53 (H) 07/08/2018    CREATININE 4 16 (H) 07/08/2018    GLUCOSE 114 07/08/2018    GLUCOSE 92 07/08/2018    CALCIUM 7 9 (L) 07/08/2018    AST 21 07/08/2018    ALT 12 07/08/2018    ALKPHOS 132 (H) 07/08/2018    PROT 8 8 (H) 07/08/2018    BILITOT 0 47 07/08/2018    EGFR 15 07/08/2018    EGFR 10 07/08/2018     Imaging: I have personally reviewed pertinent reports  EKG, Pathology, and Other Studies: I have personally reviewed pertinent reports  Counseling / Coordination of Care  Total floor / unit time spent today 30 minutes  Greater than 50% of total time was spent with the patient and / or family counseling and / or coordination of care

## 2018-07-08 NOTE — ED ATTENDING ATTESTATION
Pal Cowan MD, saw and evaluated the patient  I have discussed the patient with the resident/non-physician practitioner and agree with the resident's/non-physician practitioner's findings, Plan of Care, and MDM as documented in the resident's/non-physician practitioner's note, except where noted  All available labs and Radiology studies were reviewed  At this point I agree with the current assessment done in the Emergency Department  I have conducted an independent evaluation of this patient including a focused history of:    Emergency Department Note- Irma Grimes 66 y o  male MRN: [id number]    Unit/Bed#: ED 01 Encounter: [id number]    Irma Grimes is a 66 y o  male who presents with   Chief Complaint   Patient presents with    Abdominal Pain     patient has as abdominal pain         History of Present Illness   HPI:  Irma Grimes is a 66 y o  male who presents for evaluation of:  Abdominal cramping and weakness  Patient was noted to be markedly hypotensive on arrival in the ED  The patient had a recent admission that is very similar to this hypotensive presentation  The patient notes that his abdominal cramping is diffuse and mild  The patient is status post recent perforated transverse colon that required a large amount of repeat operative repair  Prior admissions from June 13th and May 15th were reviewed in the past medical records  Review of Systems   Unable to perform ROS: Unstable vital signs   Constitutional: Positive for fatigue  Negative for fever  Musculoskeletal: Negative for back pain and neck pain  Neurological: Positive for weakness  Negative for light-headedness         Historical Information   Past Medical History:   Diagnosis Date    Anxiety     Asthma     Cardiac disease     Chest pain     Colitis     Colon polyps     COPD (chronic obstructive pulmonary disease) (HCC)     Coronary artery disease     Diverticulitis     Esophageal reflux     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 )     Hyperlipidemia     Hypertension     IBD (inflammatory bowel disease)     Myocardial infarction ([facility] )     Old myocardial infarction     Perforation of colon (HCC)     Type 2 diabetes, diet controlled ([place] [address] )     Ulcerative colitis ([place] [address] )      Past Surgical History:   Procedure Laterality Date    ANGIOPLASTY      COLON SURGERY      COLONOSCOPY N/A 10/24/2016    Procedure: COLONOSCOPY;  Surgeon: Erendira Nicole MD;  [facility]; Service:     CORONARY ANGIOPLASTY WITH STENT PLACEMENT      ESOPHAGOGASTRODUODENOSCOPY N/A 10/24/2016    Procedure: ESOPHAGOGASTRODUODENOSCOPY (EGD); Surgeon: Erendira Nicole MD;  [facility]; Service:     EXPLORATORY LAPAROTOMY W/ BOWEL RESECTION N/A 5/22/2018    Procedure: EXPLORATORY LAPAROTOMY, ILIOCOLECTOMY, ILIOCOLONIC ANASTAMOSIS, LOOP ILIOSTOMY, REPAIR OF SEROSAL TEAR, EXTENSIVE LYSIS OF ADHESIONS, WOUND VAC PLACEMENT;  Surgeon: Erendira Nicole MD;  [facility];  Service: Colorectal    HEMICOLECTOMY      OTHER SURGICAL HISTORY      stent indications acute myocardial infarction    MD COLONOSCOPY FLX DX W/COLLJ SPEC WHEN PFRMD N/A 2/6/2018    Procedure: COLONOSCOPY;  Surgeon: Erendira Nicole MD;  [facility];   Service: Colorectal    TONSILLECTOMY       Social History   History   Alcohol Use    Yes     Comment: kenji occassionally, beers     History   Drug Use No     History   Smoking Status    Current Every Day Smoker    Types: Cigars   Smokeless Tobacco    Never Used     Comment: 3 cigars per day     Family History: non-contributory    Meds/Allergies   all medications and allergies reviewed  Allergies   Allergen Reactions    Other      Brown cloth band aids       Latex Rash       Objective   First Vitals:   Blood Pressure: (!) 51/26 (07/08/18 0226)  Pulse: 86 (07/08/18 0226)  Respirations: 14 (07/08/18 0226)  SpO2: 93 % (07/08/18 0226)    Current Vitals:   Blood Pressure: (!) 49/34 (07/08/18 7716)  Pulse: 86 (18)  Respirations: 14 (18)  SpO2: 93 % (18)    No intake or output data in the 24 hours ending 18 0239    Invasive Devices     Peripheral Intravenous Line            Peripheral IV 18 Left Forearm less than 1 day    Peripheral IV 18 Right Antecubital less than 1 day          Drain            Colostomy  RUQ 46 days    Closed/Suction Drain Left Abdomen Bulb 8 5 Fr  10 days                Physical Exam   Constitutional:   Thin pale male presents in no distress   HENT:   Head: Normocephalic and atraumatic  Pulmonary/Chest: Effort normal and breath sounds normal    Abdominal: He exhibits no distension  There is no tenderness  Colostomy bag noted over abdomen with large amount of blue liquid in colostomy bag  Musculoskeletal: Normal range of motion  He exhibits no deformity  Neurological: He is alert  Coordination normal    Skin: Skin is warm and dry  Psychiatric: He has a normal mood and affect  His behavior is normal  Judgment and thought content normal    Nursing note and vitals reviewed  Medical Decision Makin  Acute hypotension:  Similar presentation prior admission; plan to obtain CBC to rule out anemia; plan to obtain basic metabolic profile to rule out uremia and electrolyte disturbance  2   Acute renal failure with hyponatremia noted on I-STAT:  Patient is also noted to be hyperkalemic  Plan to obtain potassium from basic metabolic profile and the lab  Plan to administer calcium gluconate treat patient's hyperkalemia  Plan to obtain ECG to evaluate for ECG abnormalities relative to the hyperkalemia      Recent Results (from the past 36 hour(s))   POCT Blood Gas (CG8+)    Collection Time: 18  2:34 AM   Result Value Ref Range    ph, Ravindra ISTAT 7 448 (H) 7 300 - 7 400    pCO2, Ravindra i-STAT 35 3 (L) 42 0 - 50 0 mm HG    pO2, Ravindra i-STAT 17 0 (L) 35 0 - 45 0 mm HG    BE, i-STAT 1 -2 - 3 mmol/L    HCO3, Ravindra i-STAT 24 4 24 0 - 30 0 mmol/L    CO2, i-STAT 26 21 - 32 mmol/L    O2 Sat, i-STAT 26 (L) 95 - 98 %    SODIUM, I-STAT 117 (L) 136 - 145 mmol/l    Potassium, i-STAT 6 9 (HH) 3 5 - 5 3 mmol/L    Calcium, Ionized i-STAT 0 93 (L) 1 12 - 1 32 mmol/L    Hct, i-STAT 34 (L) 36 5 - 49 3 %    Hgb, i-STAT 11 6 (L) 12 0 - 17 0 g/dl    Glucose, i-STAT 103 65 - 140 mg/dl    Specimen Type VENOUS    POCT Chem 8+    Collection Time: 07/08/18  2:34 AM   Result Value Ref Range    SODIUM, I-STAT 116 (L) 136 - 145 mmol/l    Potassium, i-STAT 6 9 (HH) 3 5 - 5 3 mmol/L    Chloride, istat 83 (LL) 100 - 108 mmol/L    CO2, i-STAT 24 21 - 32 mmol/L    Anion Gap, Istat 17 (H) 4 - 13 mmol/L    Calcium, Ionized i-STAT 0 93 (L) 1 12 - 1 32 mmol/L    BUN, I-STAT 82 (H) 5 - 25 mg/dl    Creatinine, i-STAT 6 1 (H) 0 6 - 1 3 mg/dl    eGFR 9 ml/min/1 73sq m    Glucose, i-STAT 104 65 - 140 mg/dl    Hct, i-STAT 34 (L) 36 5 - 49 3 %    Hgb, i-STAT 11 6 (L) 12 0 - 17 0 g/dl    Specimen Type VENOUS      XR chest 1 view portable    (Results Pending)         Portions of the record may have been created with voice recognition software  Occasional wrong word or "sound a like" substitutions may have occurred due to the inherent limitations of voice recognition software  Read the chart carefully and recognize, using context, where substitutions have occurred

## 2018-07-08 NOTE — CONSULTS
9 Juani Stoner 66 y o  male MRN: [id number]  Unit/Bed#: ICU 11 Encounter: [id number]      Reason for Consultation / Chief Complaint: abdominal pain, hypotension    History of Present Illness:  Yesenia Brownlee is a 66 y o  male who presented to ED this AM for worsening abdominal pain and nausea that began around 6 PM  Pt follows with Dr Rick Cruz and has history of Crohn's s/p R hemicolectomy complicated by transverse colon perforation that required repair, anastomosis, and ileostomy  Pt was found to have intra-abdominal fluid collections due to anastomotic leak 1 month ago, and IR aspirated purulent fluid and placed drain into LUQ on 6/27  Pt improved and was subsequently discharged to home the following day  Pt says he was doing ok at home when he started to have worsening pain  He has been nauseous but has not vomited  He had been tolerating po diet at home  He reports that ostomy output has been unchanged  However, urine output has been decreased  Has chronic cough and shortness of breath from COPD that pt says are unchanged from usual  He denies chest pain, lightheadedness, dizziness  Pt initially SBP in 50s, given 3L NS and started on levophed  No central access yet  Also hyponatremic at 115, hyperkalemic at 5 9, given bicarb, insulin, dextrose, calcium gluconate with improvement to 4 9  CT in ED shows multiple intra-abdominal fluid collections, possibly abscesses  Cultures ordered, given cefepime, vanc, flagyl  History obtained from chart review and the patient      Past Medical History:  Past Medical History:   Diagnosis Date    Anxiety     Asthma     Cardiac disease     Chest pain     Colitis     Colon polyps     COPD (chronic obstructive pulmonary disease) (HCC)     Coronary artery disease     Diverticulitis     Esophageal reflux     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HCC)     Hyperlipidemia     Hypertension     IBD (inflammatory bowel disease)     Myocardial infarction [facility])     Old myocardial infarction     Perforation of colon (HCC)     Type 2 diabetes, diet controlled ([facility] [address] )     Ulcerative colitis ([facility] [address] )        Past Surgical History:  Past Surgical History:   Procedure Laterality Date    ANGIOPLASTY      COLON SURGERY      COLONOSCOPY N/A 10/24/2016    Procedure: COLONOSCOPY;  Surgeon: Gregorio Panda MD;  Location: [facility]; Service:     CORONARY ANGIOPLASTY WITH STENT PLACEMENT      ESOPHAGOGASTRODUODENOSCOPY N/A 10/24/2016    Procedure: ESOPHAGOGASTRODUODENOSCOPY (EGD); Surgeon: Gregorio Panda MD;  Location: [facility]; Service:     EXPLORATORY LAPAROTOMY W/ BOWEL RESECTION N/A 5/22/2018    Procedure: EXPLORATORY LAPAROTOMY, ILIOCOLECTOMY, ILIOCOLONIC ANASTAMOSIS, LOOP ILIOSTOMY, REPAIR OF SEROSAL TEAR, EXTENSIVE LYSIS OF ADHESIONS, WOUND VAC PLACEMENT;  Surgeon: Gregorio Panda MD;  Location: [facility];  Service: Colorectal    HEMICOLECTOMY      OTHER SURGICAL HISTORY      stent indications acute myocardial infarction    ME COLONOSCOPY FLX DX W/COLLJ SPEC WHEN PFRMD N/A 2/6/2018    Procedure: COLONOSCOPY;  Surgeon: Gregorio Panda MD;  [facility]: [facility]; Service: Colorectal    TONSILLECTOMY         Past Family History:  Family History   Problem Relation Age of Onset    Coronary artery disease Father     Crohn's disease Father     Stroke Father     Diabetes Family         Social History:  History   Smoking Status    Current Every Day Smoker    Types: Cigars   Smokeless Tobacco    Never Used     Comment: 3 cigars per day     History   Alcohol Use    Yes     Comment: kenji occassionally, beers     History   Drug Use No       Home Medications:   Prior to Admission medications    Medication Sig Start Date End Date Taking?  Authorizing Provider   acetaminophen (TYLENOL) 650 mg CR tablet Take 1 tablet (650 mg total) by mouth every 8 (eight) hours as needed for mild pain 6/1/18   Luciano Echevarria MD   albuterol (2 5 mg/3 mL) 0 083 % nebulizer solution Take 3 mL (2 5 mg total) by nebulization every 4 (four) hours as needed for wheezing as needed for wheezing 6/1/18   Quincy Correa MD   amiodarone 200 mg tablet Take 1 tablet (200 mg total) by mouth daily 6/6/18   Quincy Correa MD   aspirin 81 mg chewable tablet Chew 1 tablet (81 mg total) daily 6/1/18   Quincy Correa MD   atorvastatin (LIPITOR) 40 mg tablet Take 1 tablet (40 mg total) by mouth daily with dinner for 30 days 6/1/18 7/1/18  Quincy Correa MD   gabapentin (NEURONTIN) 400 mg capsule Take 1 capsule (400 mg total) by mouth 3 (three) times a day 6/1/18   Quincy Correa MD   lisinopril (ZESTRIL) 2 5 mg tablet Take 1 tablet (2 5 mg total) by mouth daily 6/1/18   Quincy Correa MD   metoprolol succinate (TOPROL-XL) 50 mg 24 hr tablet Take 1 tablet (50 mg total) by mouth daily 6/1/18   Quincy Correa MD   Nebulizers (AERONEB GO NEBULIZER HANDSET) MISC by Does not apply route 2 (two) times a day as needed (wheezing) 6/8/18   Luciano Echevarria MD   nicotine (NICODERM CQ) 7 mg/24hr TD 24 hr patch Place 1 patch on the skin every 24 hours 6/8/18   Luciano Echevarria MD   oxyCODONE (ROXICODONE) 10 MG TABS Take 1 tablet (10 mg total) by mouth every 4 (four) hours as needed for severe pain for up to 10 days Earliest Fill Date: 6/28/18 Max Daily Amount: 60 mg 6/28/18 7/8/18  Dotty Wong PA-C   Tiotropium Bromide Monohydrate (SPIRIVA RESPIMAT) 2 5 MCG/ACT AERS Inhale 1 Act (2 5 mcg total) daily 6/1/18   Luciano Echevarria MD   VENTOLIN  (90 Base) MCG/ACT inhaler Inhale 2 puffs every 4 (four) hours as needed for wheezing or shortness of breath 6/1/18   Luciano Echevarria MD   vitamin A 10,000 units capsule Take 3 capsules (30,000 Units total) by mouth daily 6/1/18   Quincy Correa MD       Inpatient Medications:  Scheduled Meds:    Current Facility-Administered Medications:  norepinephrine        norepinephrine 1-30 mcg/min Intravenous Titrated Ilda Larson DO Last Rate: 10 mcg/min (07/08/18 6795) vancomycin 15 mg/kg Intravenous Once Edgar Lo, DO      Continuous Infusions:    norepinephrine 1-30 mcg/min Last Rate: 10 mcg/min (18 0609)     PRN Meds:       Allergies: Allergies   Allergen Reactions    Other      Brown cloth band aids       Latex Rash       ROS:   Review of Systems   Constitutional: Positive for fatigue  Negative for activity change, chills and fever  HENT: Negative for congestion, rhinorrhea, sore throat and trouble swallowing  Eyes: Negative for pain, discharge and visual disturbance  Respiratory: Positive for cough (baseline) and shortness of breath (baseline)  Negative for wheezing  Cardiovascular: Negative for chest pain, palpitations and leg swelling  Gastrointestinal: Positive for abdominal distention, abdominal pain and nausea  Negative for vomiting  Genitourinary: Positive for decreased urine volume  Negative for dysuria and hematuria  Musculoskeletal: Negative for joint swelling, neck pain and neck stiffness  Skin: Positive for rash (groin) and wound (small sacral)  Neurological: Negative for dizziness, syncope, light-headedness and headaches  Vitals:  Vitals:    18 0420 18 0430 18 0510 18 0530   BP: 92/58 (!) 85/48 93/57 (!) 74/50   BP Location:  Left arm  Left arm   Pulse: 93 86 84 90   Resp: 18 18 20 20   Temp:       TempSrc:       SpO2: 91% 91% 94% 93%     Temperature:   Temp (24hrs), Av 1 °F (35 6 °C), Min:96 1 °F (35 6 °C), Max:96 1 °F (35 6 °C)    Current Temperature: (!) 96 1 °F (35 6 °C)    Weights: There is no height or weight on file to calculate BMI  Non-Invasive/Invasive Ventilation Settings:  Respiratory    Lab Data (Last 4 hours)    None         O2/Vent Data (Last 4 hours)    None              No results found for: PHART, VAB3BEY, PO2ART, SCE3AAP, K0ANIVWZ, BEART, SOURCE  SpO2: SpO2: 93 %     Physical Exam:  Physical Exam   Constitutional: He is oriented to person, place, and time   No distress  Thin, appears older than stated age   HENT:   Head: Normocephalic and atraumatic  Mouth/Throat: Oropharynx is clear and moist    Eyes: Conjunctivae and EOM are normal  Pupils are equal, round, and reactive to light  Right eye exhibits no discharge  Left eye exhibits no discharge  No scleral icterus  Neck: Normal range of motion  Neck supple  Cardiovascular: Normal rate, regular rhythm and intact distal pulses  Pulmonary/Chest: Effort normal  No stridor  No respiratory distress  He exhibits no tenderness  Occasional coarse breath sounds  Productive cough  Chronic per pt   Abdominal: Soft  He exhibits distension  There is tenderness (diffusely)  There is no rebound and no guarding  Ostomy pink, well perfused  Ostomy bag with dark green fluid   Musculoskeletal: He exhibits no edema, tenderness or deformity  Neurological: He is alert and oriented to person, place, and time  Skin: Skin is warm and dry  Rash (erythema with excoriations to groin) noted  He is not diaphoretic  Buttock blanching erythema, few dried scabs to L buttock   Nursing note and vitals reviewed        Labs:    Results from last 7 days  Lab Units 07/08/18  0350 07/08/18 0234 07/08/18  0233   WBC Thousand/uL  --   --  22 37*   HEMOGLOBIN g/dL  --   --  10 5*   I STAT HEMOGLOBIN g/dl 8 8* 11 6*  11 6*  --    HEMATOCRIT %  --   --  31 8*   PLATELETS Thousands/uL  --   --  617*   MONO PCT MAN %  --   --  5        Results from last 7 days  Lab Units 07/08/18  0350 07/08/18 0234 07/08/18  0233   SODIUM mmol/L  --   --  115*   POTASSIUM mmol/L  --   --  5 9*   CHLORIDE mmol/L  --   --  81*   CO2 mmol/L  --   --  22   BUN mg/dL  --   --  60*   CREATININE mg/dL  --   --  5 46*   CALCIUM mg/dL  --   --  8 7   TOTAL PROTEIN g/dL  --   --  8 8*   BILIRUBIN TOTAL mg/dL  --   --  0 47   ALK PHOS U/L  --   --  132*   ALT U/L  --   --  12   AST U/L  --   --  21   GLUCOSE RANDOM mg/dL  --   --  97   GLUCOSE, ISTAT mg/dl 92 104  103  -- Results from last 7 days  Lab Units 07/08/18  0233   INR  0 96   PTT seconds 27       Results from last 7 days  Lab Units 07/08/18  0233   LACTIC ACID mmol/L 1 2       0  Lab Value Date/Time   TROPONINI <0 02 06/13/2018 1512   TROPONINI 0 12 (H) 05/20/2018 1753   TROPONINI 0 17 (H) 05/20/2018 1505   TROPONINI 0 17 (H) 05/20/2018 1214   TROPONINI 0 29 (H) 05/19/2018 0438   TROPONINI 0 30 (H) 05/19/2018 0154   TROPONINI 0 32 (H) 05/18/2018 2229   TROPONINI 0 32 (H) 05/18/2018 1909   TROPONINI 1 15 (H) 05/16/2018 1416   TROPONINI 1 24 (H) 05/16/2018 0522   TROPONINI 1 16 (H) 05/16/2018 0209   TROPONINI 1 09 (H) 05/16/2018 0053   TROPONINI 0 85 (H) 05/15/2018 1935   TROPONINI 0 72 (H) 05/15/2018 1728   TROPONINI 0 47 (H) 05/15/2018 1304   TROPONINI 0 02 12/07/2016 1931   TROPONINI 0 04 (H) 12/07/2016 0907   TROPONINI <0 04 05/22/2015 0123       Imaging:   Ct Abdomen Pelvis Wo Contrast    Result Date: 7/8/2018  Impression: 1  Irregular tract containing gas adjacent to the ileocolic anastomosis has decreased in size however persistent sinus tract/leak is not excluded  2   Collection associated with the left-sided pigtail catheter is nearly resolved  3   Persistent mesenteric edema and fluid with loculated sub-3 cm collections likely involving the inferior tip of the spleen, near the aortic bifurcation, and in the right hemiabdomen are stable and may represent abscesses  Workstation performed: [id number]      I have personally reviewed pertinent films in PACS     EKG:   Normal sinus rhythm   This was personally reviewed by myself  Micro:  Lab Results   Component Value Date    BLOODCX No Growth After 5 Days  06/13/2018    BLOODCX No Growth After 5 Days  06/13/2018    BLOODCX No Growth After 5 Days   05/15/2018    URINECX >100,000 cfu/ml Candida sp  presumptively albicans (A) 06/13/2018    URINECX 4950-0431 cfu/ml  06/13/2018 ______________________________________________________________________    Assessment and Plan:     Neuro:  Alert, oriented, mentating well, GCS 15  Pain control- will start with fentanyl 50mcg q2h prn  History of alcohol abuse- pt denies any alcohol consumption for at least a few months  Previously drank kenji  Thiamine, folate, monitor for DTs    CV:  Hypotension likely due to septic shock- initial SBP 50s  Now improved to 90s  Received 3L NS in ED  Careful fluid resuscitation due to hyponatremia  Currently on 10 of levophed, was up to 15 in ED  Will start vaso as well  Lactic acid surprisingly 1 2 in ED  Will obtain consent and place central line and arterial line  CAD s/p stent- holding home metoprolol, lisinopril, amiodarone due to hypotension  Holding statin as well  Lung:  Chronic COPD- no acute exacerbation  Continue spiriva daily, prn nebs  Not on home O2 at baseline  Tobacco abuse- nicotine patch  Smoking cessation encouraged  GI:  Multiple intra-abdominal fluid collections, likely abscess- broad spectrum abx  Vanc, cefepime, flagyl started in ED  Will continue cefepime and flagyl  Colorectal surgery following  Monitor LUQ drain output, appears serosanguinous  Monitor ostomy output    F/E/N:  Hyponatremia- Na 115 on admission, received 3L NS in ED  Will repeat Na level before giving further IVF  Na q4h  Hyperkalemia- K+ 5 9 on admission, most recent istat improved to 4 9 after insulin, dextrose, bicarb, and calcium  No concerning EKG changes  Will repeat BMP now  DEBBY with Cr 5 46 (on discharge 6/28 was 0 72)  BMP q4h Avoid nephrotoxic meds  NPO for now    :  Strict IOs  Pt voiding spontaneously, is still making urine    ID:  Fluid collections in abdomen likely abscesses  Afebrile, but leukocytosis of 22, procal 1  12  Blood cultures pending  Vanc, cefepime, flagyl started in ED  Will continue cefepime and flagyl  Will need source control per colorectal surgery   Previous culture from purulent fluid drained by IR on 6/27 with 1+mixed skin daniel  Heme:   Heparin TID, SCDs for DVT ppx    Endo:  No acute issues  No known history of DM  Not on chronic steroids  Msk/Skin:  Nystatin powder to groin  Frequent offloading, repositioning    Disposition: monitor in ICU    ______________________________________________________________________    VTE Pharmacologic Prophylaxis: Heparin  VTE Mechanical Prophylaxis: sequential compression device    Invasive lines and devices: Invasive Devices     Peripheral Intravenous Line            Peripheral IV 07/08/18 Left Hand less than 1 day    Peripheral IV 07/08/18 Left Hand less than 1 day    Peripheral IV 07/08/18 Right Antecubital less than 1 day          Drain            Colostomy  RUQ 46 days    Closed/Suction Drain Left Abdomen Bulb 8 5 Fr  10 days                Code Status: Level 1 - Full Code  POA:   pt states he would like his daughter to be called/make decisions for him in the event that he is unable  POLST:      Given critical illness, patient length of stay will require greater than two midnights  Portions of the record may have been created with voice recognition software  Occasional wrong word or "sound a like" substitutions may have occurred due to the inherent limitations of voice recognition software  Read the chart carefully and recognize, using context, where substitutions have occurred        Maddie Coon MD    Consults

## 2018-07-09 ENCOUNTER — APPOINTMENT (INPATIENT)
Dept: RADIOLOGY | Facility: HOSPITAL | Age: 58
DRG: 720 | End: 2018-07-09
Payer: COMMERCIAL

## 2018-07-09 PROBLEM — N39.0 UTI (URINARY TRACT INFECTION): Status: ACTIVE | Noted: 2018-07-09

## 2018-07-09 LAB
ALBUMIN SERPL BCP-MCNC: 1.9 G/DL (ref 3.5–5)
ALP SERPL-CCNC: 103 U/L (ref 46–116)
ALT SERPL W P-5'-P-CCNC: 9 U/L (ref 12–78)
ANION GAP SERPL CALCULATED.3IONS-SCNC: 10 MMOL/L (ref 4–13)
ANION GAP SERPL CALCULATED.3IONS-SCNC: 6 MMOL/L (ref 4–13)
ANION GAP SERPL CALCULATED.3IONS-SCNC: 7 MMOL/L (ref 4–13)
ANION GAP SERPL CALCULATED.3IONS-SCNC: 9 MMOL/L (ref 4–13)
AST SERPL W P-5'-P-CCNC: 7 U/L (ref 5–45)
BASOPHILS # BLD AUTO: 0.05 THOUSANDS/ΜL (ref 0–0.1)
BASOPHILS NFR BLD AUTO: 0 % (ref 0–1)
BILIRUB DIRECT SERPL-MCNC: 0.06 MG/DL (ref 0–0.2)
BILIRUB SERPL-MCNC: 0.46 MG/DL (ref 0.2–1)
BUN SERPL-MCNC: 18 MG/DL (ref 5–25)
BUN SERPL-MCNC: 26 MG/DL (ref 5–25)
BUN SERPL-MCNC: 27 MG/DL (ref 5–25)
BUN SERPL-MCNC: 37 MG/DL (ref 5–25)
CA-I BLD-SCNC: 1.16 MMOL/L (ref 1.12–1.32)
CALCIUM SERPL-MCNC: 7.7 MG/DL (ref 8.3–10.1)
CALCIUM SERPL-MCNC: 7.8 MG/DL (ref 8.3–10.1)
CALCIUM SERPL-MCNC: 7.9 MG/DL (ref 8.3–10.1)
CALCIUM SERPL-MCNC: 7.9 MG/DL (ref 8.3–10.1)
CHLORIDE SERPL-SCNC: 104 MMOL/L (ref 100–108)
CHLORIDE SERPL-SCNC: 106 MMOL/L (ref 100–108)
CHLORIDE SERPL-SCNC: 106 MMOL/L (ref 100–108)
CHLORIDE SERPL-SCNC: 107 MMOL/L (ref 100–108)
CO2 SERPL-SCNC: 19 MMOL/L (ref 21–32)
CO2 SERPL-SCNC: 21 MMOL/L (ref 21–32)
CO2 SERPL-SCNC: 21 MMOL/L (ref 21–32)
CO2 SERPL-SCNC: 23 MMOL/L (ref 21–32)
CORTIS SERPL-MCNC: 19.8 UG/DL
CREAT SERPL-MCNC: 0.9 MG/DL (ref 0.6–1.3)
CREAT SERPL-MCNC: 0.98 MG/DL (ref 0.6–1.3)
CREAT SERPL-MCNC: 1.19 MG/DL (ref 0.6–1.3)
CREAT SERPL-MCNC: 1.64 MG/DL (ref 0.6–1.3)
EOSINOPHIL # BLD AUTO: 0.13 THOUSAND/ΜL (ref 0–0.61)
EOSINOPHIL NFR BLD AUTO: 1 % (ref 0–6)
ERYTHROCYTE [DISTWIDTH] IN BLOOD BY AUTOMATED COUNT: 16.1 % (ref 11.6–15.1)
GFR SERPL CREATININE-BSD FRML MDRD: 46 ML/MIN/1.73SQ M
GFR SERPL CREATININE-BSD FRML MDRD: 68 ML/MIN/1.73SQ M
GFR SERPL CREATININE-BSD FRML MDRD: 86 ML/MIN/1.73SQ M
GFR SERPL CREATININE-BSD FRML MDRD: 95 ML/MIN/1.73SQ M
GLUCOSE SERPL-MCNC: 109 MG/DL (ref 65–140)
GLUCOSE SERPL-MCNC: 111 MG/DL (ref 65–140)
GLUCOSE SERPL-MCNC: 112 MG/DL (ref 65–140)
GLUCOSE SERPL-MCNC: 117 MG/DL (ref 65–140)
GLUCOSE SERPL-MCNC: 130 MG/DL (ref 65–140)
GLUCOSE SERPL-MCNC: 99 MG/DL (ref 65–140)
HCT VFR BLD AUTO: 27.4 % (ref 36.5–49.3)
HGB BLD-MCNC: 8.8 G/DL (ref 12–17)
IMM GRANULOCYTES # BLD AUTO: 0.1 THOUSAND/UL (ref 0–0.2)
IMM GRANULOCYTES NFR BLD AUTO: 1 % (ref 0–2)
LYMPHOCYTES # BLD AUTO: 1.2 THOUSANDS/ΜL (ref 0.6–4.47)
LYMPHOCYTES NFR BLD AUTO: 9 % (ref 14–44)
MAGNESIUM SERPL-MCNC: 2.3 MG/DL (ref 1.6–2.6)
MCH RBC QN AUTO: 29 PG (ref 26.8–34.3)
MCHC RBC AUTO-ENTMCNC: 32.1 G/DL (ref 31.4–37.4)
MCV RBC AUTO: 90 FL (ref 82–98)
MONOCYTES # BLD AUTO: 1.25 THOUSAND/ΜL (ref 0.17–1.22)
MONOCYTES NFR BLD AUTO: 9 % (ref 4–12)
NEUTROPHILS # BLD AUTO: 10.58 THOUSANDS/ΜL (ref 1.85–7.62)
NEUTS SEG NFR BLD AUTO: 80 % (ref 43–75)
NRBC BLD AUTO-RTO: 0 /100 WBCS
PHOSPHATE SERPL-MCNC: 3.1 MG/DL (ref 2.7–4.5)
PLATELET # BLD AUTO: 424 THOUSANDS/UL (ref 149–390)
PMV BLD AUTO: 10.2 FL (ref 8.9–12.7)
POTASSIUM SERPL-SCNC: 4.4 MMOL/L (ref 3.5–5.3)
POTASSIUM SERPL-SCNC: 4.5 MMOL/L (ref 3.5–5.3)
POTASSIUM SERPL-SCNC: 4.6 MMOL/L (ref 3.5–5.3)
POTASSIUM SERPL-SCNC: 4.8 MMOL/L (ref 3.5–5.3)
PROT SERPL-MCNC: 6.2 G/DL (ref 6.4–8.2)
RBC # BLD AUTO: 3.03 MILLION/UL (ref 3.88–5.62)
SODIUM SERPL-SCNC: 134 MMOL/L (ref 136–145)
SODIUM SERPL-SCNC: 134 MMOL/L (ref 136–145)
SODIUM SERPL-SCNC: 135 MMOL/L (ref 136–145)
SODIUM SERPL-SCNC: 136 MMOL/L (ref 136–145)
TSH SERPL DL<=0.05 MIU/L-ACNC: 2.42 UIU/ML (ref 0.36–3.74)
WBC # BLD AUTO: 13.31 THOUSAND/UL (ref 4.31–10.16)

## 2018-07-09 PROCEDURE — 4A133B1 MONITORING OF ARTERIAL PRESSURE, PERIPHERAL, PERCUTANEOUS APPROACH: ICD-10-PCS | Performed by: SURGERY

## 2018-07-09 PROCEDURE — 85025 COMPLETE CBC W/AUTO DIFF WBC: CPT | Performed by: NURSE PRACTITIONER

## 2018-07-09 PROCEDURE — 49424 ASSESS CYST CONTRAST INJECT: CPT

## 2018-07-09 PROCEDURE — 4A133J1 MONITORING OF ARTERIAL PULSE, PERIPHERAL, PERCUTANEOUS APPROACH: ICD-10-PCS | Performed by: SURGERY

## 2018-07-09 PROCEDURE — 84100 ASSAY OF PHOSPHORUS: CPT | Performed by: NURSE PRACTITIONER

## 2018-07-09 PROCEDURE — 02HV33Z INSERTION OF INFUSION DEVICE INTO SUPERIOR VENA CAVA, PERCUTANEOUS APPROACH: ICD-10-PCS | Performed by: SURGERY

## 2018-07-09 PROCEDURE — BW111ZZ FLUOROSCOPY OF ABDOMEN AND PELVIS USING LOW OSMOLAR CONTRAST: ICD-10-PCS | Performed by: RADIOLOGY

## 2018-07-09 PROCEDURE — 83735 ASSAY OF MAGNESIUM: CPT | Performed by: NURSE PRACTITIONER

## 2018-07-09 PROCEDURE — 80076 HEPATIC FUNCTION PANEL: CPT | Performed by: NURSE PRACTITIONER

## 2018-07-09 PROCEDURE — 76080 X-RAY EXAM OF FISTULA: CPT | Performed by: RADIOLOGY

## 2018-07-09 PROCEDURE — 80048 BASIC METABOLIC PNL TOTAL CA: CPT | Performed by: PHYSICIAN ASSISTANT

## 2018-07-09 PROCEDURE — 03HY32Z INSERTION OF MONITORING DEVICE INTO UPPER ARTERY, PERCUTANEOUS APPROACH: ICD-10-PCS | Performed by: SURGERY

## 2018-07-09 PROCEDURE — 99291 CRITICAL CARE FIRST HOUR: CPT | Performed by: SURGERY

## 2018-07-09 PROCEDURE — 84443 ASSAY THYROID STIM HORMONE: CPT | Performed by: PHYSICIAN ASSISTANT

## 2018-07-09 PROCEDURE — 82948 REAGENT STRIP/BLOOD GLUCOSE: CPT

## 2018-07-09 PROCEDURE — 49424 ASSESS CYST CONTRAST INJECT: CPT | Performed by: RADIOLOGY

## 2018-07-09 PROCEDURE — 82533 TOTAL CORTISOL: CPT | Performed by: PHYSICIAN ASSISTANT

## 2018-07-09 PROCEDURE — 82330 ASSAY OF CALCIUM: CPT | Performed by: PHYSICIAN ASSISTANT

## 2018-07-09 PROCEDURE — 36556 INSERT NON-TUNNEL CV CATH: CPT | Performed by: PHYSICIAN ASSISTANT

## 2018-07-09 PROCEDURE — 71045 X-RAY EXAM CHEST 1 VIEW: CPT

## 2018-07-09 PROCEDURE — 94640 AIRWAY INHALATION TREATMENT: CPT

## 2018-07-09 PROCEDURE — 76080 X-RAY EXAM OF FISTULA: CPT

## 2018-07-09 PROCEDURE — 80048 BASIC METABOLIC PNL TOTAL CA: CPT | Performed by: NURSE PRACTITIONER

## 2018-07-09 PROCEDURE — 94760 N-INVAS EAR/PLS OXIMETRY 1: CPT

## 2018-07-09 RX ORDER — DOCUSATE SODIUM 100 MG/1
100 CAPSULE, LIQUID FILLED ORAL DAILY
Status: DISCONTINUED | OUTPATIENT
Start: 2018-07-09 | End: 2018-07-10

## 2018-07-09 RX ORDER — OXYCODONE HYDROCHLORIDE 5 MG/1
5 TABLET ORAL EVERY 4 HOURS PRN
Status: DISCONTINUED | OUTPATIENT
Start: 2018-07-09 | End: 2018-07-12

## 2018-07-09 RX ORDER — MIDAZOLAM HYDROCHLORIDE 1 MG/ML
2 INJECTION INTRAMUSCULAR; INTRAVENOUS ONCE
Status: COMPLETED | OUTPATIENT
Start: 2018-07-09 | End: 2018-07-09

## 2018-07-09 RX ORDER — FENTANYL CITRATE 50 UG/ML
50 INJECTION, SOLUTION INTRAMUSCULAR; INTRAVENOUS EVERY 2 HOUR PRN
Status: DISCONTINUED | OUTPATIENT
Start: 2018-07-09 | End: 2018-07-11

## 2018-07-09 RX ORDER — OXYCODONE HYDROCHLORIDE 10 MG/1
10 TABLET ORAL EVERY 4 HOURS PRN
Status: DISCONTINUED | OUTPATIENT
Start: 2018-07-09 | End: 2018-07-12

## 2018-07-09 RX ORDER — OXAZEPAM 10 MG
10 CAPSULE ORAL EVERY 6 HOURS SCHEDULED
Status: DISCONTINUED | OUTPATIENT
Start: 2018-07-09 | End: 2018-07-12

## 2018-07-09 RX ORDER — MIDAZOLAM HYDROCHLORIDE 1 MG/ML
INJECTION INTRAMUSCULAR; INTRAVENOUS
Status: COMPLETED
Start: 2018-07-09 | End: 2018-07-09

## 2018-07-09 RX ORDER — SENNOSIDES 8.6 MG
1 TABLET ORAL
Status: DISCONTINUED | OUTPATIENT
Start: 2018-07-09 | End: 2018-07-10

## 2018-07-09 RX ADMIN — SODIUM CHLORIDE 125 ML/HR: 0.45 INJECTION, SOLUTION INTRAVENOUS at 12:18

## 2018-07-09 RX ADMIN — HEPARIN SODIUM 5000 UNITS: 5000 INJECTION, SOLUTION INTRAVENOUS; SUBCUTANEOUS at 23:01

## 2018-07-09 RX ADMIN — DEXMEDETOMIDINE HYDROCHLORIDE 0.7 MCG/KG/HR: 100 INJECTION, SOLUTION INTRAVENOUS at 21:56

## 2018-07-09 RX ADMIN — LEVALBUTEROL HYDROCHLORIDE 1.25 MG: 1.25 SOLUTION, CONCENTRATE RESPIRATORY (INHALATION) at 20:21

## 2018-07-09 RX ADMIN — NYSTATIN: 100000 POWDER TOPICAL at 17:35

## 2018-07-09 RX ADMIN — NYSTATIN: 100000 POWDER TOPICAL at 10:18

## 2018-07-09 RX ADMIN — CEFEPIME HYDROCHLORIDE 2000 MG: 2 INJECTION, POWDER, FOR SOLUTION INTRAVENOUS at 04:05

## 2018-07-09 RX ADMIN — OXYCODONE HYDROCHLORIDE 10 MG: 10 TABLET ORAL at 16:39

## 2018-07-09 RX ADMIN — OXYCODONE HYDROCHLORIDE 10 MG: 10 TABLET ORAL at 12:51

## 2018-07-09 RX ADMIN — HYDROCORTISONE SODIUM SUCCINATE 80 MG: 100 INJECTION, POWDER, FOR SOLUTION INTRAMUSCULAR; INTRAVENOUS at 23:02

## 2018-07-09 RX ADMIN — LEVALBUTEROL HYDROCHLORIDE 1.25 MG: 1.25 SOLUTION, CONCENTRATE RESPIRATORY (INHALATION) at 07:55

## 2018-07-09 RX ADMIN — OXAZEPAM 10 MG: 10 CAPSULE, GELATIN COATED ORAL at 17:35

## 2018-07-09 RX ADMIN — NICOTINE 7 MG: 7 PATCH, EXTENDED RELEASE TRANSDERMAL at 08:31

## 2018-07-09 RX ADMIN — HEPARIN SODIUM 5000 UNITS: 5000 INJECTION, SOLUTION INTRAVENOUS; SUBCUTANEOUS at 15:08

## 2018-07-09 RX ADMIN — FOLIC ACID 1 MG: 5 INJECTION, SOLUTION INTRAMUSCULAR; INTRAVENOUS; SUBCUTANEOUS at 10:18

## 2018-07-09 RX ADMIN — IOHEXOL 3 ML: 300 INJECTION, SOLUTION INTRAVENOUS at 09:29

## 2018-07-09 RX ADMIN — MIDAZOLAM 2 MG: 1 INJECTION INTRAMUSCULAR; INTRAVENOUS at 21:59

## 2018-07-09 RX ADMIN — OXAZEPAM 10 MG: 10 CAPSULE, GELATIN COATED ORAL at 23:44

## 2018-07-09 RX ADMIN — FENTANYL CITRATE 50 MCG: 50 INJECTION INTRAMUSCULAR; INTRAVENOUS at 08:27

## 2018-07-09 RX ADMIN — MIDAZOLAM HYDROCHLORIDE 2 MG: 1 INJECTION INTRAMUSCULAR; INTRAVENOUS at 21:59

## 2018-07-09 RX ADMIN — LORAZEPAM 1 MG: 2 INJECTION INTRAMUSCULAR; INTRAVENOUS at 15:13

## 2018-07-09 RX ADMIN — NOREPINEPHRINE BITARTRATE 4 MCG/MIN: 1 INJECTION INTRAVENOUS at 22:31

## 2018-07-09 RX ADMIN — METRONIDAZOLE 500 MG: 500 INJECTION, SOLUTION INTRAVENOUS at 05:31

## 2018-07-09 RX ADMIN — HEPARIN SODIUM 5000 UNITS: 5000 INJECTION, SOLUTION INTRAVENOUS; SUBCUTANEOUS at 05:31

## 2018-07-09 RX ADMIN — NOREPINEPHRINE BITARTRATE 5 MCG/MIN: 1 INJECTION INTRAVENOUS at 16:07

## 2018-07-09 RX ADMIN — ISODIUM CHLORIDE 3 ML: 0.03 SOLUTION RESPIRATORY (INHALATION) at 07:55

## 2018-07-09 RX ADMIN — NOREPINEPHRINE BITARTRATE 7 MCG/MIN: 1 INJECTION INTRAVENOUS at 00:30

## 2018-07-09 RX ADMIN — VANCOMYCIN HYDROCHLORIDE 1500 MG: 1 INJECTION, POWDER, LYOPHILIZED, FOR SOLUTION INTRAVENOUS at 15:07

## 2018-07-09 RX ADMIN — ISODIUM CHLORIDE 3 ML: 0.03 SOLUTION RESPIRATORY (INHALATION) at 20:21

## 2018-07-09 RX ADMIN — PIPERACILLIN SODIUM AND TAZOBACTAM SODIUM 3.38 G: 36; 4.5 INJECTION, POWDER, FOR SOLUTION INTRAVENOUS at 20:34

## 2018-07-09 RX ADMIN — PIPERACILLIN SODIUM AND TAZOBACTAM SODIUM 3.38 G: 36; 4.5 INJECTION, POWDER, FOR SOLUTION INTRAVENOUS at 15:07

## 2018-07-09 RX ADMIN — FENTANYL CITRATE 50 MCG: 50 INJECTION INTRAMUSCULAR; INTRAVENOUS at 10:31

## 2018-07-09 RX ADMIN — OXAZEPAM 10 MG: 10 CAPSULE, GELATIN COATED ORAL at 12:52

## 2018-07-09 RX ADMIN — DEXMEDETOMIDINE HYDROCHLORIDE 0.5 MCG/KG/HR: 100 INJECTION, SOLUTION INTRAVENOUS at 17:59

## 2018-07-09 RX ADMIN — DEXMEDETOMIDINE HYDROCHLORIDE 0.5 MCG/KG/HR: 100 INJECTION, SOLUTION INTRAVENOUS at 04:45

## 2018-07-09 RX ADMIN — LORAZEPAM 1 MG: 2 INJECTION INTRAMUSCULAR; INTRAVENOUS at 19:44

## 2018-07-09 RX ADMIN — DEXMEDETOMIDINE HYDROCHLORIDE 0.5 MCG/KG/HR: 100 INJECTION, SOLUTION INTRAVENOUS at 12:16

## 2018-07-09 RX ADMIN — ISODIUM CHLORIDE 3 ML: 0.03 SOLUTION RESPIRATORY (INHALATION) at 13:24

## 2018-07-09 RX ADMIN — TIOTROPIUM BROMIDE 18 MCG: 18 CAPSULE ORAL; RESPIRATORY (INHALATION) at 10:18

## 2018-07-09 RX ADMIN — MIDAZOLAM 2 MG: 1 INJECTION INTRAMUSCULAR; INTRAVENOUS at 21:36

## 2018-07-09 RX ADMIN — LEVALBUTEROL HYDROCHLORIDE 1.25 MG: 1.25 SOLUTION, CONCENTRATE RESPIRATORY (INHALATION) at 13:24

## 2018-07-09 RX ADMIN — FENTANYL CITRATE 50 MCG: 50 INJECTION INTRAMUSCULAR; INTRAVENOUS at 04:09

## 2018-07-09 RX ADMIN — LORAZEPAM 1 MG: 2 INJECTION INTRAMUSCULAR; INTRAVENOUS at 05:31

## 2018-07-09 RX ADMIN — LORAZEPAM 1 MG: 2 INJECTION INTRAMUSCULAR; INTRAVENOUS at 10:49

## 2018-07-09 RX ADMIN — MIDAZOLAM HYDROCHLORIDE 2 MG: 1 INJECTION INTRAMUSCULAR; INTRAVENOUS at 21:36

## 2018-07-09 NOTE — PROGRESS NOTES
Progress Note - Critical Care   Sherrell Aguilar 66 y o  male MRN: [id number]  Unit/Bed#: ICU 11 Encounter: [id number]    Impression:  Principal Problem:    Septic shock ([facility] [address] )  Active Problems:    Coronary artery disease involving native coronary artery of native heart    COPD (chronic obstructive pulmonary disease) (HCC)    Hyponatremia    Alcohol abuse    Tobacco abuse    Heart failure, systolic, due to idiopathic cardiomyopathy (HCC)    Moderate protein-calorie malnutrition (HCC)    Anemia    Intra-abdominal abscess (HCC)    DEBBY (acute kidney injury) ([facility] )      Plan:    Neuro:   · Presumed alcohol withdrawal - hx of DT's last admission  Less likely to be toxic metabolic etiology  · Started on Precedex gtt at 0 5 yesterday, and ativan PRN (4 doses in 24 hrs)  · Continue folic acid/thiamine daily  · Pain controlled with: PRN fentanyl  · Regulate sleep/wake cycle  · Trend neuro exam - GCS 15    CV:   · Shock 2/2 hypovolemia and intraabdominal abscesses, UTI  · Cardiac infusions: Levophed, 4 mcg/min  · MAP goal > 65  · Rhythm: NSR  · Follow rhythm on telemetry    Resp:   · No issues  Maintaining adequate O2 sats on NC  · History of COPD, for which he wears oxygen at home (2-3L)  Continue inhalers, nebs PRN    GI:   · Hx of Crohn's colitis - s/p hemicolectomy in 2005  May 2018 had transverse colon perforation due to bowel ischemia - s/p repair, anastomosis, and diverting ileostomy   Since then has had chronic intraabdominal abscesses due to anastomosis leak  · S/p SANTA drain placed by IR on 6/27  · CT scan 7/8 -  Demonstrated persistent fluid collections  · Follow-up with colorectal surgery regarding plan  · Ileostomy with 250 ml stool in 24 hrs  · SANTA drain 0 ml in 24 hrs  · Stress ulcer prophylaxis: not indicated  · Bowel regimen: add when needed    F/E/N:   · Fluid/Diuretic plan: 1/2 NS at 125ml/hr  · Nutrition/diet plan: NPO  · Replete electrolytes with goals: K >4 0, Mag >2 0, and Phos >3 0  · Hyponatremia - sodium initially 115  Trending up to 135    :   · DEBBY - creatinine initally 5 4, trending down to 1 6  · Indwelling Hernandez present: yes   · UTI - UA with leukocytes and bacteria  Culture pending  · 3 1 L UOP in 24 hrs  Trend UOP and BUN/creat  · Strict I and O    ID:   · Source of infection: Intraabdominal abscess, UTI  · Abx ordered:  Cefepime, Flagyl, vanc  · Day # 2   · Vancomycin trough due: today  · Afebrile  Trend temps and WBC count  · Maintain normothermia    Heme:   · Hemoglobin 8 8, near baseline  · Trend hgb and plts  · Transfuse as needed for goal hgb > 8    Endo:   · Glycemic control plan: BS under control at this time, SSI PRN    MSK/Skin:  · Frequent turning and pressure off-loading  · Local wound care as needed    Lines:  · Central venous access: R IJ TLC  · Arterial line: R radial    VTE Prophylaxis:  · Pharmacologic Prophylaxis: Heparin  · Mechanical Prophylaxis: sequential compression device    Disposition: Continue ICU care    Consultants:  Colorectal surgery    Reason for Admission: Shock likely 2/2 sepsis in the setting of chronic intraabdominal abscesses  HPI/24hr events: No significant events, remains on levophed at 4    Physical Exam:    General Appearance:  NAD, sleeping in bed    HENT: Normocephalic without obvious abnormality    Eyes: No icterus    Cardiac: RRR, no murmur, no rub  Pulses intact    Pulmonary: Rhonchi to auscultation bilaterally, no cough, no secretions    Gastrointestinal: Soft, no distention, generalized tenderness      : Hernandez present: yes             Musculoskeletal: No edema bilaterally       Neuro/Psych:  GCS 15    Skin: warm/dry    Vitals:   Vitals:    07/09/18 0100 07/09/18 0200 07/09/18 0215 07/09/18 0300   BP:       BP Location:       Pulse: 64 60 60 66   Resp: 15 15 15 17   Temp: 97 5 °F (36 4 °C) 97 5 °F (36 4 °C) 97 5 °F (36 4 °C) 97 5 °F (36 4 °C)   TempSrc:       SpO2: 100% 100% 100% 100%   Weight:       Height:         Arterial Line BP: 106/42  Arterial Line MAP (mmHg): 62 mmHg    Temperature: Temp (24hrs), Av 5 °F (36 4 °C), Min:96 1 °F (35 6 °C), Max:99 °F (37 2 °C)  Current: Temperature: 97 5 °F (36 4 °C)    Weights: IBW: 63 8 kg  Body mass index is 22 42 kg/m²  Hemodynamic Monitoring    Respiratory:  SpO2: SpO2: 100 %, SpO2 Activity: SpO2 Activity: At Rest, SpO2 Device: O2 Device: Nasal cannula  O2 Flow Rate (L/min): 5 L/min    Intake and Outputs:  Intake/Output Summary (Last 24 hours) at 18 0702  Last data filed at 18 0200   Gross per 24 hour   Intake          3209 12 ml   Output             3350 ml   Net          -140 88 ml     I/O last 24 hours: In: 3209 1 [I V :2859 1; IV Piggyback:350]  Out: 3350 [Urine:3100; Stool:250]    Stool:      Nutrition:        Diet Orders            Start     Ordered    18 4439  Diet NPO  Diet effective now     Question Answer Comment   Diet Type NPO    RD to adjust diet per protocol?  No        18 0634          Labs:     Results from last 7 days  Lab Units 18  0605 18  0724 18  0350 18  0234 18  0233   WBC Thousand/uL 13 31*  --   --   --  22 37*   HEMOGLOBIN g/dL 8 8*  --   --   --  10 5*   I STAT HEMOGLOBIN g/dl  --   --  8 8* 11 6*  11 6*  --    HEMATOCRIT % 27 4*  --   --   --  31 8*   PLATELETS Thousands/uL 424* 499*  --   --  617*   NEUTROS PCT % 80*  --   --   --   --    MONOS PCT % 9  --   --   --   --    MONO PCT MAN %  --   --   --   --  5       Results from last 7 days  Lab Units 18  0422 18  2345 18  1500 18  1117  18  0233   SODIUM mmol/L  --  135* 129* 124*  < > 115*   POTASSIUM mmol/L  --  4 5 4 5 4 6  < > 5 9*   CHLORIDE mmol/L  --  104 100 96*  < > 81*   CO2 mmol/L  --  21 21 20*  < > 22   BUN mg/dL  --  37* 43* 47*  < > 60*   CREATININE mg/dL  --  1 64* 2 67* 3 42*  < > 5 46*   CALCIUM mg/dL  --  7 9* 7 7* 7 7*  < > 8 7   TOTAL PROTEIN g/dL 6 2*  --   --   --   --  8 8*   BILIRUBIN TOTAL mg/dL 0 46  -- --   --   --  0 47   ALK PHOS U/L 103  --   --   --   --  132*   ALT U/L 9*  --   --   --   --  12   AST U/L 7  --   --   --   --  21   GLUCOSE RANDOM mg/dL  --  130 130 148*  < > 97   GLUCOSE, ISTAT   --   --   --   --   < >  --    < > = values in this interval not displayed  Results from last 7 days  Lab Units 07/09/18  0422 07/08/18  1500 07/08/18  1117   MAGNESIUM mg/dL 2 3 3 3* 1 0*       Results from last 7 days  Lab Units 07/09/18  0422 07/08/18  1117   PHOSPHORUS mg/dL 3 1 4 5        Results from last 7 days  Lab Units 07/08/18  0233   INR  0 96   PTT seconds 27       Results from last 7 days  Lab Units 07/08/18  0233   LACTIC ACID mmol/L 1 2       Imaging:   Ct Abdomen Pelvis Wo Contrast    Result Date: 7/8/2018  Impression: 1  Irregular tract containing gas adjacent to the ileocolic anastomosis has decreased in size however persistent sinus tract/leak is not excluded  2   Collection associated with the left-sided pigtail catheter is nearly resolved  3   Persistent mesenteric edema and fluid with loculated sub-3 cm collections likely involving the inferior tip of the spleen, near the aortic bifurcation, and in the right hemiabdomen are stable and may represent abscesses  Workstation performed: [id number]       Micro:   Blood Culture:   Lab Results   Component Value Date    BLOODCX No Growth After 5 Days  06/13/2018    BLOODCX No Growth After 5 Days  06/13/2018    BLOODCX No Growth After 5 Days  05/15/2018    BLOODCX No Growth After 5 Days  05/15/2018     Urine Culture:   Lab Results   Component Value Date    URINECX >100,000 cfu/ml Candida sp  presumptively albicans (A) 06/13/2018    URINECX 1191-2222 cfu/ml  06/13/2018     Sputum Culture: No components found for: SPUTUMCX  Wound Culure: No results found for: WOUNDCULT        Allergies:    Allergies   Allergen Reactions    Other      Brown cloth band aids       Latex Rash       Medications:   Scheduled Meds:  Current Facility-Administered Medications:  albuterol 2 5 mg Nebulization Q6H PRN Shabana Mancuso MD    cefepime 2,000 mg Intravenous Q24H Justine Yu MD Last Rate: 2,000 mg (07/09/18 0405)   dexmedetomidine 0 1-0 7 mcg/kg/hr Intravenous Titrated AMALIA Olivares Last Rate: 0 5 mcg/kg/hr (07/09/18 0445)   fentanyl citrate (PF) 50 mcg Intravenous Q2H PRN Justine Yu MD    folic acid IVPB 1 mg Intravenous Daily Justine Yu MD Last Rate: Stopped (07/08/18 1044)   heparin (porcine) 5,000 Units Subcutaneous [facility] Justine Yu MD    levalbuterol 1 25 mg Nebulization TID Shabana Mancuso MD    LORazepam 1 mg Intravenous Q4H PRN AMALIA Olivares    metroNIDAZOLE 500 mg Intravenous Q8H Justine Yu MD Last Rate: 500 mg (07/09/18 0531)   nicotine 7 mg Transdermal Daily Justine Yu MD    norepinephrine 1-30 mcg/min Intravenous Titrated Tildon Jazzy DO Last Rate: 6 mcg/min (07/09/18 0052)   nystatin  Topical BID Justine Yu MD    sodium chloride 1,000 mL Intravenous Once Justine Yu MD    sodium chloride 3 mL Nebulization TID Shabana Mancuso MD    sodium chloride 125 mL/hr Intravenous Continuous Skyla Mcgraw PA-C Last Rate: 125 mL/hr (07/08/18 1841)   tiotropium 18 mcg Inhalation Daily Justine Yu MD    vasopressin (PITRESSIN) in 0 9 % sodium chloride 100 mL 0 04 Units/min Intravenous Continuous Justine Yu MD Last Rate: Stopped (07/08/18 0289)     Continuous Infusions:  dexmedetomidine 0 1-0 7 mcg/kg/hr Last Rate: 0 5 mcg/kg/hr (07/09/18 0445)   norepinephrine 1-30 mcg/min Last Rate: 6 mcg/min (07/09/18 0052)   sodium chloride 125 mL/hr Last Rate: 125 mL/hr (07/08/18 1841)   vasopressin (PITRESSIN) in 0 9 % sodium chloride 100 mL 0 04 Units/min Last Rate: Stopped (07/08/18 0838)     PRN Meds:    albuterol 2 5 mg Q6H PRN   fentanyl citrate (PF) 50 mcg Q2H PRN   LORazepam 1 mg Q4H PRN       Invasive lines and devices:   Invasive Devices     Central Venous Catheter Line            CVC Central Lines 07/08/18 Triple Right Internal jugular less than 1 day          Arterial Line            Arterial Line 07/08/18 Radial less than 1 day          Drain            Colostomy  RUQ 47 days    Closed/Suction Drain Left Abdomen Bulb 8 5 Fr  11 days    Urethral Catheter Temperature probe 16 Fr  less than 1 day                Code Status: Level 1 - Full Code    Counseling / Coordination of Care  Total Critical Care time spent 15 minutes excluding procedures, teaching and family updates  Portions of the record may have been created with voice recognition software  Occasional wrong word or "sound a like" substitutions may have occurred due to the inherent limitations of voice recognition software  Read the chart carefully and recognize, using context, where substitutions have occurred      SIGNATURE: Xavier Bell PA-C  DATE: July 9, 2018  TIME: 7:02 AM

## 2018-07-09 NOTE — SOCIAL WORK
Pt is a <30 days readmission  Pt was last admitted 6/13-6/28 d/t hypotension d/t dehydration  Pt is not a bundle  CM met pt at bedside and made aware of CM role at d/c  Pt denies having a LW or POA  Primary contact is his daughter [phone]  Pt reported that he lives with DTR Angel Luis Mckinnon and Amparo's 3 Kids( ages 14,19 and 24) in a 3 story house with no KAREN  Pt reported that he has a 1st floor set up  Pt was IPTA with all ADL's, drive and unemployed  Pt has a walker, bedside commode, wheeled walker and home O2 from [employer]  Pt reported that he is current with [facility] with nsg 2-3x/wk for his Ileostomy and requested continuation of care, referral in [facility] [address]  Pt denies hx with STR, alc, drug and IP psych tx  Pt reported that he used to drink 2-3 shots of kenji/day but stopped drinking alc 2 mos ago  Pt refused HOST  Pharmacy is [facility] in [facility]  PCP is Kimberlyn Shepherd  Pt has transportation when d/c   CM reviewed d/c planning process including the following: identifying help at home, patient preference for d/c planning needs, Discharge Lounge, [facility] Meds to Bed program, availability of treatment team to discuss questions or concerns patient and/or family may have regarding understanding medications and recognizing signs and symptoms once discharged  CM also encouraged patient to follow up with all recommended appointments after discharge  Patient advised of importance for patient and family to participate in managing patients medical well being

## 2018-07-09 NOTE — BRIEF OP NOTE (RAD/CATH)
INTERVENTIONAL RADIOLOGY PROCEDURE NOTE    Preoperative diagnosis:  Follow-up of left upper quadrant drain    Postoperative diagnosis: Same    Procedure:  Tube check    Surgeon: Mohsen Aparicio MD     Assistant: None  No qualified resident was available  Blood loss:  None    Specimens:  None    Findings:  Cavity slightly smaller than previous, however, still draining purulent material   No fistula present  Saline flushes ordered      Complications:  None immediate    Anesthesia:  None

## 2018-07-09 NOTE — CASE MANAGEMENT
Initial Clinical Review    Admission: Date/Time/Statement: 7/8/18 @ 0413     Orders Placed This Encounter   Procedures    Inpatient Admission     Standing Status:   Standing     Number of Occurrences:   1     Order Specific Question:   Admitting Physician     Answer:   Anna Vásquez [[id number]]     Order Specific Question:   Level of Care     Answer:   Critical Care [15]     Order Specific Question:   Estimated length of stay     Answer:   More than 2 Midnights     Order Specific Question:   Certification     Answer:   I certify that inpatient services are medically necessary for this patient for a duration of greater than two midnights  See H&P and MD Progress Notes for additional information about the patient's course of treatment  ED: Date/Time/Mode of Arrival:   ED Arrival Information     Expected Arrival Acuity Means of Arrival Escorted By Service Admission Type    - 7/8/2018 02:11 Emergent Ambulance [facility] Critical Care/ICU Emergency    Arrival Complaint    Abdominal Pain          Chief Complaint:   Chief Complaint   Patient presents with    Abdominal Pain     patient has as abdominal pain       History of Illness:    Radha Kirkland is a 66 y o  male who presented to ED this AM for worsening abdominal pain and nausea that began around 6 PM  Pt follows with Dr Femi Hdz and has history of Crohn's s/p R hemicolectomy complicated by transverse colon perforation that required repair, anastomosis, and ileostomy  Pt was found to have intra-abdominal fluid collections due to anastomotic leak 1 month ago, and IR aspirated purulent fluid and placed drain into LUQ on 6/27  Pt improved and was subsequently discharged to home the following day  Pt says he was doing ok at home when he started to have worsening pain  He has been nauseous but has not vomited  He had been tolerating po diet at home  He reports that ostomy output has been unchanged   However, urine output has been decreased    CT in ED shows multiple intra-abdominal fluid collections, possibly abscesses  Cultures ordered, given cefepime, vanc, flagyl  ED Vital Signs:   ED Triage Vitals   Temperature Pulse Respirations Blood Pressure SpO2   07/08/18 0307 07/08/18 0226 07/08/18 0226 07/08/18 0226 07/08/18 0226   (!) 96 1 °F (35 6 °C) 86 14 (!) 51/26 93 %         Pain Score       07/08/18 0226       Worst Possible Pain       07/08/18 63 kg (138 lb 14 2 oz)       Vital Signs (abnormal):    07/08/18 0430  --  86  18   85/48  91 %  None (Room air)  Lying   07/08/18 0420  --  93  18  92/58  91 %  None (Room air)  --   07/08/18 0404  --   52  19   63/47  98 %  None (Room air)  Lying   07/08/18 0345  --  96  18   76/45  92 %  --  --   07/08/18 0307   96 1 °F (35 6 °C)  --  --  --  --  --  --   07/08/18 0229  --  --  --   49/34               Abnormal Labs/Diagnostic Test Results:    Abdominal: Soft  He exhibits distension  There is tenderness (diffusely)  There is no rebound and no guarding  Ostomy pink, well perfused   Ostomy bag with dark green fluid     Lab Units 07/08/18  0350 07/08/18  0234 07/08/18  0233   WBC Thousand/uL  --   --  22 37*   HEMOGLOBIN g/dL  --   --  10 5*   I STAT HEMOGLOBIN g/dl 8 8* 11 6*  11 6*  --    HEMATOCRIT %  --   --  31 8*   PLATELETS Thousands/uL  --   --  617*     Lab Units 07/08/18  0233   SODIUM mmol/L 115*   POTASSIUM mmol/L 5 9*   CHLORIDE mmol/L 81*   BUN mg/dL 60*   CREATININE mg/dL 5 46*   TOTAL PROTEIN g/dL 8 8*   ALK PHOS U/L 132*     Urine, Clean Catch     RBC, UA None Seen /hpf    WBC, UA Innumerable (A) /hpf    Epithelial Cells Occasional /hpf    Bacteria, UA Moderate (A) /hpf    Hyaline Casts, UA 0-3 (A) /lpf     Procalcitonin 1 12 (H)     Final result     ph, Ravindra ISTAT 7 448 (H)    pCO2, Ravindra i-STAT 35 3 (L) mm HG    pO2, Ravindra i-STAT 17 0 (L) mm HG    BE, i-STAT 1 mmol/L    HCO3, Ravindra i-STAT 24 4 mmol/L    CO2, i-STAT 26 mmol/L    O2 Sat, i-STAT 26 (L) %    SODIUM, I-STAT 117 (L) mmol/l    Potassium, i-STAT 6 9 (HH) mmol/L    Calcium, Ionized i-STAT 0 93 (L) mmol/L    Hct, i-STAT 34 (L) %    Hgb, i-STAT 11 6 (L) g/dl         Ct Abdomen Pelvis Wo Contrast     Result Date: 7/8/2018  Impression: 1  Irregular tract containing gas adjacent to the ileocolic anastomosis has decreased in size however persistent sinus tract/leak is not excluded  2   Collection associated with the left-sided pigtail catheter is nearly resolved  3   Persistent mesenteric edema and fluid with loculated sub-3 cm collections likely involving the inferior tip of the spleen, near the aortic bifurcation, and in the right hemiabdomen are stable and may represent abscesses      ED Treatment:   Medication Administration from 07/08/2018 0211 to 07/08/2018 0600       Date/Time Order Dose Route     07/08/2018 0534 norepinephrine (LEVOPHED) 4 mg (STANDARD CONCENTRATION) IV in sodium chloride 0 9% 250 mL 10 mcg/min Intravenous     07/08/2018 0523 norepinephrine (LEVOPHED) 4 mg (STANDARD CONCENTRATION) IV in sodium chloride 0 9% 250 mL 5 mcg/min Intravenous     07/08/2018 0403 norepinephrine (LEVOPHED) 4 mg (STANDARD CONCENTRATION) IV in sodium chloride 0 9% 250 mL 10 mcg/min Intravenous     07/08/2018 0301 norepinephrine (LEVOPHED) 4 mg (STANDARD CONCENTRATION) IV in sodium chloride 0 9% 250 mL 5 mcg/min Intravenous     07/08/2018 0245 norepinephrine (LEVOPHED) 4 mg (STANDARD CONCENTRATION) IV in sodium chloride 0 9% 250 mL 10 mcg/min Intravenous     07/08/2018 0256 sodium chloride 0 9 % bolus 1,000 mL 1,000 mL Intravenous     07/08/2018 0256 sodium chloride 0 9 % bolus 1,000 mL 1,000 mL Intravenous     07/08/2018 0321 calcium gluconate 2 g in sodium chloride 0 9 % 100 mL IVPB 2 g Intravenous     07/08/2018 0318 sodium bicarbonate 8 4 % injection 50 mEq 50 mEq Intravenous     07/08/2018 0314 insulin regular (HumuLIN R,NovoLIN R) injection 10 Units 10 Units Intravenous     07/08/2018 0313 dextrose 50 % IV solution 50 mL 50 mL Intravenous     07/08/2018 0522 cefepime (MAXIPIME) 2 g/50 mL dextrose IVPB 0 mg Intravenous     07/08/2018 0446 cefepime (MAXIPIME) 2 g/50 mL dextrose IVPB 2,000 mg Intravenous     07/08/2018 0530 metroNIDAZOLE (FLAGYL) IVPB (premix) 500 mg 500 mg Intravenous     07/08/2018 0525 fentanyl citrate (PF) 100 MCG/2ML 50 mcg 50 mcg Intravenous          Past Medical/Surgical History:       Past Medical History:    Anxiety     Asthma     Cardiac disease     Chest pain     Colitis     Colon polyps     COPD (chronic obstructive pulmonary disease) (HCC)     Coronary artery disease     Diverticulitis     Esophageal reflux     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HCC)     Hyperlipidemia     Hypertension     IBD (inflammatory bowel disease)     Myocardial infarction ([facility] )     Old myocardial infarction     Perforation of colon (HCC)     Type 2 diabetes, diet controlled ([facility] )     [facility] ([facility] )        Admitting Diagnosis: Hypovolemic shock ([place] [address] ) [R57 1]  Hyperkalemia [E87 5]  Abdominal pain [R10 9]  Hypotension [I95 9]  Acute renal failure (ARF) ([facility] ) [N17 9]    Age/Sex: 66 y o  male    Assessment/Plan:     CRITICAL CARE    Hypotension likely due to septic shock- initial SBP 50s  Now improved to 90s  Received 3L NS in ED  Careful fluid resuscitation due to hyponatremia  Currently on 10 of levophed, was up to 15 in ED  Will start vaso as well  Lactic acid surprisingly 1 2 in ED  Will obtain consent and place central line and arterial line  CAD s/p stent- holding home metoprolol, lisinopril, amiodarone due to hypotension  Holding statin as well  Multiple intra-abdominal fluid collections, likely abscess- broad spectrum abx  Vanc, cefepime, flagyl started in ED  Will continue cefepime and flagyl  Colorectal surgery following  Monitor LUQ drain output, appears serosanguinous  Monitor ostomy output    Hyponatremia- Na 115 on admission, received 3L NS in ED  Will repeat Na level before giving further IVF   Na q4h   Hyperkalemia- K+ 5 9 on admission, most recent istat improved to 4 9 after insulin, dextrose, bicarb, and calcium  No concerning EKG changes  Will repeat BMP now  DEBBY with Cr 5 46 (on discharge 6/28 was 0 72)  BMP q4h Avoid nephrotoxic meds  NPO for now    Fluid collections in abdomen likely abscesses  Afebrile, but leukocytosis of 22, procal 1  12  Blood cultures pending  Vanc, cefepime, flagyl started in ED  Will continue cefepime and flagyl  Will need source control per colorectal surgery  Previous culture from purulent fluid drained by IR on 6/27 with 1+mixed skin daniel  COLO RECTAL SURGERY CONSULT    56N w/history of perforated transverse colon s/p EDITH, ileocolonic resection, diverting ileostomy in 5/2018, c/b intra-abdominal abscess s/p IR drain placement 6/26/18   Now presenting with nausea and abdominal pain, found to be hypotensive and in acute renal failure on evaluation in the ED      Plan:  - agree with care by SICU team  - NPO  - cause of shock may be secondary to hypovolemic shock 2/2 known high output ostomy rather than septic shock  - aggressive IVF resuscitation  - cefepime/flagyl  - check endpoints  - wean levophed for MAPs > 65  - continue IR drain to suction     Admission Orders:      CRITICAL CARE   BLOOD CULTURE X  2  RESULTS PENDING   NPO   INSERT URINARY CATHETER  URINE CULTURE   CVC  LINE TO INTERNAL JUGULAR  ARTRIAL LINE    RESTRAINT  NON VIOLENT   Rec IR tube check for remaining SANTA  BODY FLUID CULTURE  PENDING         Scheduled Meds:   Current Facility-Administered Medications:  albuterol 2 5 mg Nebulization Q6H PRN   dexmedetomidine 0 1-0 7 mcg/kg/hr Intravenous Titrated   docusate sodium 100 mg Oral Daily   fentanyl citrate (PF) 50 mcg Intravenous H0R PRN   folic acid IVPB 1 mg Intravenous Daily   heparin (porcine) 5,000 Units Subcutaneous Q8H [address]   levalbuterol 1 25 mg Nebulization TID   LORazepam 1 mg Intravenous Q4H PRN   nicotine 7 mg Transdermal Daily   norepinephrine 1-30 mcg/min Intravenous Titrated   nystatin  Topical BID   piperacillin-tazobactam 3 375 g Intravenous Q6H   senna 1 tablet Oral HS   sodium chloride 3 mL Nebulization TID   sodium chloride 125 mL/hr Intravenous Continuous   tiotropium 18 mcg Inhalation Daily   vancomycin 1,500 mg Intravenous Once   [START ON 7/10/2018] vancomycin 12 5 mg/kg Intravenous Q12H     Continuous Infusions:   dexmedetomidine 0 1-0 7 mcg/kg/hr Last Rate: 0 5 mcg/kg/hr (07/09/18 1216)   norepinephrine 1-30 mcg/min Last Rate: 5 mcg/min (07/09/18 0800)   sodium chloride 125 mL/hr Last Rate: 125 mL/hr (07/09/18 1218)     PRN Meds:   albuterol    fentanyl citrate (PF)    LORazepam

## 2018-07-09 NOTE — PROGRESS NOTES
Progress Note - General Surgery   Zenon Owen 66 y o  male MRN: [id number]  Unit/Bed#: ICU 11 Encounter: [id number]    Assessment:  66 y o  M w/ hypotension suspect 2/2 severe dehydration; presented w/ hyponatremia and DEBBY    Additionally, small fluid collections seen on CT scan otherwise, his imaging is stable     Cr better and pressor requirements significantly improved w/ volume  WBC improving      Plan:  Cont volume rehydration as able  Wean pressors  Trend endpoints  Rec IR tube check for remaining SANTA  Ok to resume clear liquids today after IR    Subjective/Objective     Subjective:     Objective:     Blood pressure 135/55, pulse 60, temperature (!) 97 2 °F (36 2 °C), temperature source Probe, resp  rate 20, height 5' 6" (1 676 m), weight 63 kg (138 lb 14 2 oz), SpO2 100 %  ,Body mass index is 22 42 kg/m²        Intake/Output Summary (Last 24 hours) at 07/09/18 1001  Last data filed at 07/09/18 0800   Gross per 24 hour   Intake          4210 61 ml   Output             3275 ml   Net           935 61 ml       Invasive Devices     Central Venous Catheter Line            CVC Central Lines 07/08/18 Triple Right Internal jugular 1 day          Arterial Line            Arterial Line 07/08/18 Radial less than 1 day          Drain            Colostomy  RUQ 47 days    Closed/Suction Drain Left Abdomen Bulb 8 5 Fr  11 days    Urethral Catheter Temperature probe 16 Fr  1 day                Physical Exam:   NAD  CV RRR  Pulm CTA  Abd soft, nondistended, minimally tender, ostomy w/ bilious output    Lab, Imaging and other studies:  CBC:   Lab Results   Component Value Date    WBC 13 31 (H) 07/09/2018    HGB 8 8 (L) 07/09/2018    HCT 27 4 (L) 07/09/2018    MCV 90 07/09/2018     (H) 07/09/2018    MCH 29 0 07/09/2018    MCHC 32 1 07/09/2018    RDW 16 1 (H) 07/09/2018    MPV 10 2 07/09/2018    NRBC 0 07/09/2018   , CMP:   Lab Results   Component Value Date     (L) 07/09/2018    K 4 6 07/09/2018     07/09/2018    CO2 21 07/09/2018    ANIONGAP 6 07/09/2018    BUN 27 (H) 07/09/2018    CREATININE 1 19 07/09/2018    GLUCOSE 117 07/09/2018    CALCIUM 7 7 (L) 07/09/2018    AST 7 07/09/2018    ALT 9 (L) 07/09/2018    ALKPHOS 103 07/09/2018    PROT 6 2 (L) 07/09/2018    BILITOT 0 46 07/09/2018    EGFR 68 07/09/2018   , Coagulation: No results found for: PT, INR, APTT  VTE Pharmacologic Prophylaxis: Heparin  VTE Mechanical Prophylaxis: sequential compression device

## 2018-07-10 LAB
ABO GROUP BLD: NORMAL
ANION GAP SERPL CALCULATED.3IONS-SCNC: 7 MMOL/L (ref 4–13)
BASOPHILS # BLD AUTO: 0.03 THOUSANDS/ΜL (ref 0–0.1)
BASOPHILS NFR BLD AUTO: 0 % (ref 0–1)
BLD GP AB SCN SERPL QL: NEGATIVE
BUN SERPL-MCNC: 11 MG/DL (ref 5–25)
CALCIUM SERPL-MCNC: 8.3 MG/DL (ref 8.3–10.1)
CHLORIDE SERPL-SCNC: 106 MMOL/L (ref 100–108)
CO2 SERPL-SCNC: 23 MMOL/L (ref 21–32)
CREAT SERPL-MCNC: 0.76 MG/DL (ref 0.6–1.3)
EOSINOPHIL # BLD AUTO: 0 THOUSAND/ΜL (ref 0–0.61)
EOSINOPHIL NFR BLD AUTO: 0 % (ref 0–6)
ERYTHROCYTE [DISTWIDTH] IN BLOOD BY AUTOMATED COUNT: 15.9 % (ref 11.6–15.1)
GFR SERPL CREATININE-BSD FRML MDRD: 102 ML/MIN/1.73SQ M
GLUCOSE SERPL-MCNC: 103 MG/DL (ref 65–140)
GLUCOSE SERPL-MCNC: 129 MG/DL (ref 65–140)
GLUCOSE SERPL-MCNC: 134 MG/DL (ref 65–140)
GLUCOSE SERPL-MCNC: 88 MG/DL (ref 65–140)
HCT VFR BLD AUTO: 24 % (ref 36.5–49.3)
HCT VFR BLD AUTO: 24.8 % (ref 36.5–49.3)
HGB BLD-MCNC: 7.7 G/DL (ref 12–17)
HGB BLD-MCNC: 7.8 G/DL (ref 12–17)
IMM GRANULOCYTES # BLD AUTO: 0.12 THOUSAND/UL (ref 0–0.2)
IMM GRANULOCYTES NFR BLD AUTO: 1 % (ref 0–2)
LYMPHOCYTES # BLD AUTO: 0.5 THOUSANDS/ΜL (ref 0.6–4.47)
LYMPHOCYTES NFR BLD AUTO: 4 % (ref 14–44)
MCH RBC QN AUTO: 28.8 PG (ref 26.8–34.3)
MCHC RBC AUTO-ENTMCNC: 31 G/DL (ref 31.4–37.4)
MCV RBC AUTO: 93 FL (ref 82–98)
MONOCYTES # BLD AUTO: 0.14 THOUSAND/ΜL (ref 0.17–1.22)
MONOCYTES NFR BLD AUTO: 1 % (ref 4–12)
NEUTROPHILS # BLD AUTO: 12.99 THOUSANDS/ΜL (ref 1.85–7.62)
NEUTS SEG NFR BLD AUTO: 94 % (ref 43–75)
NRBC BLD AUTO-RTO: 0 /100 WBCS
PLATELET # BLD AUTO: 369 THOUSANDS/UL (ref 149–390)
PMV BLD AUTO: 10.7 FL (ref 8.9–12.7)
POTASSIUM SERPL-SCNC: 4.6 MMOL/L (ref 3.5–5.3)
RBC # BLD AUTO: 2.67 MILLION/UL (ref 3.88–5.62)
RH BLD: POSITIVE
SODIUM SERPL-SCNC: 136 MMOL/L (ref 136–145)
SPECIMEN EXPIRATION DATE: NORMAL
WBC # BLD AUTO: 13.78 THOUSAND/UL (ref 4.31–10.16)

## 2018-07-10 PROCEDURE — 94760 N-INVAS EAR/PLS OXIMETRY 1: CPT

## 2018-07-10 PROCEDURE — 85025 COMPLETE CBC W/AUTO DIFF WBC: CPT | Performed by: PHYSICIAN ASSISTANT

## 2018-07-10 PROCEDURE — 99233 SBSQ HOSP IP/OBS HIGH 50: CPT | Performed by: SURGERY

## 2018-07-10 PROCEDURE — 99291 CRITICAL CARE FIRST HOUR: CPT | Performed by: SURGERY

## 2018-07-10 PROCEDURE — 85014 HEMATOCRIT: CPT | Performed by: PHYSICIAN ASSISTANT

## 2018-07-10 PROCEDURE — 80048 BASIC METABOLIC PNL TOTAL CA: CPT | Performed by: PHYSICIAN ASSISTANT

## 2018-07-10 PROCEDURE — 94640 AIRWAY INHALATION TREATMENT: CPT

## 2018-07-10 PROCEDURE — 82948 REAGENT STRIP/BLOOD GLUCOSE: CPT

## 2018-07-10 PROCEDURE — 86901 BLOOD TYPING SEROLOGIC RH(D): CPT | Performed by: PHYSICIAN ASSISTANT

## 2018-07-10 PROCEDURE — 86850 RBC ANTIBODY SCREEN: CPT | Performed by: PHYSICIAN ASSISTANT

## 2018-07-10 PROCEDURE — 86900 BLOOD TYPING SEROLOGIC ABO: CPT | Performed by: PHYSICIAN ASSISTANT

## 2018-07-10 PROCEDURE — 85018 HEMOGLOBIN: CPT | Performed by: PHYSICIAN ASSISTANT

## 2018-07-10 RX ORDER — MIDODRINE HYDROCHLORIDE 5 MG/1
2.5 TABLET ORAL
Status: DISCONTINUED | OUTPATIENT
Start: 2018-07-10 | End: 2018-07-11

## 2018-07-10 RX ORDER — NYSTATIN 100000 U/G
OINTMENT TOPICAL 2 TIMES DAILY
Status: DISCONTINUED | OUTPATIENT
Start: 2018-07-10 | End: 2018-07-17 | Stop reason: HOSPADM

## 2018-07-10 RX ADMIN — NYSTATIN: 100000 OINTMENT TOPICAL at 09:46

## 2018-07-10 RX ADMIN — OXYCODONE HYDROCHLORIDE 10 MG: 10 TABLET ORAL at 21:28

## 2018-07-10 RX ADMIN — OXYCODONE HYDROCHLORIDE 10 MG: 10 TABLET ORAL at 14:15

## 2018-07-10 RX ADMIN — PIPERACILLIN SODIUM AND TAZOBACTAM SODIUM 3.38 G: 36; 4.5 INJECTION, POWDER, FOR SOLUTION INTRAVENOUS at 21:10

## 2018-07-10 RX ADMIN — ISODIUM CHLORIDE 3 ML: 0.03 SOLUTION RESPIRATORY (INHALATION) at 19:19

## 2018-07-10 RX ADMIN — OXYCODONE HYDROCHLORIDE 10 MG: 10 TABLET ORAL at 09:46

## 2018-07-10 RX ADMIN — PIPERACILLIN SODIUM AND TAZOBACTAM SODIUM 3.38 G: 36; 4.5 INJECTION, POWDER, FOR SOLUTION INTRAVENOUS at 10:06

## 2018-07-10 RX ADMIN — HYDROCORTISONE SODIUM SUCCINATE 80 MG: 100 INJECTION, POWDER, FOR SOLUTION INTRAMUSCULAR; INTRAVENOUS at 21:10

## 2018-07-10 RX ADMIN — FENTANYL CITRATE 50 MCG: 50 INJECTION, SOLUTION INTRAMUSCULAR; INTRAVENOUS at 19:38

## 2018-07-10 RX ADMIN — VANCOMYCIN HYDROCHLORIDE 750 MG: 750 INJECTION, SOLUTION INTRAVENOUS at 03:00

## 2018-07-10 RX ADMIN — SODIUM CHLORIDE 125 ML/HR: 0.45 INJECTION, SOLUTION INTRAVENOUS at 18:34

## 2018-07-10 RX ADMIN — OXAZEPAM 10 MG: 10 CAPSULE, GELATIN COATED ORAL at 17:27

## 2018-07-10 RX ADMIN — LORAZEPAM 1 MG/HR: 2 INJECTION INTRAMUSCULAR; INTRAVENOUS at 10:14

## 2018-07-10 RX ADMIN — LORAZEPAM 1 MG: 2 INJECTION INTRAMUSCULAR; INTRAVENOUS at 19:58

## 2018-07-10 RX ADMIN — FENTANYL CITRATE 50 MCG: 50 INJECTION, SOLUTION INTRAMUSCULAR; INTRAVENOUS at 11:13

## 2018-07-10 RX ADMIN — PIPERACILLIN SODIUM AND TAZOBACTAM SODIUM 3.38 G: 36; 4.5 INJECTION, POWDER, FOR SOLUTION INTRAVENOUS at 16:11

## 2018-07-10 RX ADMIN — MIDODRINE HYDROCHLORIDE 2.5 MG: 5 TABLET ORAL at 16:11

## 2018-07-10 RX ADMIN — OXAZEPAM 10 MG: 10 CAPSULE, GELATIN COATED ORAL at 06:33

## 2018-07-10 RX ADMIN — PIPERACILLIN SODIUM AND TAZOBACTAM SODIUM 3.38 G: 36; 4.5 INJECTION, POWDER, FOR SOLUTION INTRAVENOUS at 03:30

## 2018-07-10 RX ADMIN — MIDAZOLAM 1 MG/HR: 5 INJECTION INTRAMUSCULAR; INTRAVENOUS at 01:00

## 2018-07-10 RX ADMIN — MIDODRINE HYDROCHLORIDE 2.5 MG: 5 TABLET ORAL at 12:18

## 2018-07-10 RX ADMIN — SODIUM CHLORIDE 125 ML/HR: 0.45 INJECTION, SOLUTION INTRAVENOUS at 09:20

## 2018-07-10 RX ADMIN — ISODIUM CHLORIDE 3 ML: 0.03 SOLUTION RESPIRATORY (INHALATION) at 07:21

## 2018-07-10 RX ADMIN — OXYCODONE HYDROCHLORIDE 10 MG: 10 TABLET ORAL at 04:35

## 2018-07-10 RX ADMIN — LEVALBUTEROL HYDROCHLORIDE 1.25 MG: 1.25 SOLUTION, CONCENTRATE RESPIRATORY (INHALATION) at 07:21

## 2018-07-10 RX ADMIN — LEVALBUTEROL HYDROCHLORIDE 1.25 MG: 1.25 SOLUTION, CONCENTRATE RESPIRATORY (INHALATION) at 13:25

## 2018-07-10 RX ADMIN — HEPARIN SODIUM 5000 UNITS: 5000 INJECTION, SOLUTION INTRAVENOUS; SUBCUTANEOUS at 21:10

## 2018-07-10 RX ADMIN — DOCUSATE SODIUM 100 MG: 100 CAPSULE, LIQUID FILLED ORAL at 09:46

## 2018-07-10 RX ADMIN — VANCOMYCIN HYDROCHLORIDE 1250 MG: 10 INJECTION, POWDER, LYOPHILIZED, FOR SOLUTION INTRAVENOUS at 12:18

## 2018-07-10 RX ADMIN — LEVALBUTEROL HYDROCHLORIDE 1.25 MG: 1.25 SOLUTION, CONCENTRATE RESPIRATORY (INHALATION) at 19:19

## 2018-07-10 RX ADMIN — OXAZEPAM 10 MG: 10 CAPSULE, GELATIN COATED ORAL at 12:18

## 2018-07-10 RX ADMIN — ISODIUM CHLORIDE 3 ML: 0.03 SOLUTION RESPIRATORY (INHALATION) at 13:25

## 2018-07-10 RX ADMIN — HEPARIN SODIUM 5000 UNITS: 5000 INJECTION, SOLUTION INTRAVENOUS; SUBCUTANEOUS at 13:54

## 2018-07-10 RX ADMIN — NICOTINE 7 MG: 7 PATCH, EXTENDED RELEASE TRANSDERMAL at 09:54

## 2018-07-10 RX ADMIN — FOLIC ACID 1 MG: 5 INJECTION, SOLUTION INTRAMUSCULAR; INTRAVENOUS; SUBCUTANEOUS at 11:16

## 2018-07-10 RX ADMIN — MIDODRINE HYDROCHLORIDE 2.5 MG: 5 TABLET ORAL at 09:47

## 2018-07-10 RX ADMIN — NYSTATIN: 100000 OINTMENT TOPICAL at 17:27

## 2018-07-10 RX ADMIN — LORAZEPAM 1 MG: 2 INJECTION INTRAMUSCULAR; INTRAVENOUS at 16:29

## 2018-07-10 RX ADMIN — HYDROCORTISONE SODIUM SUCCINATE 80 MG: 100 INJECTION, POWDER, FOR SOLUTION INTRAMUSCULAR; INTRAVENOUS at 06:33

## 2018-07-10 RX ADMIN — TIOTROPIUM BROMIDE 18 MCG: 18 CAPSULE ORAL; RESPIRATORY (INHALATION) at 09:46

## 2018-07-10 RX ADMIN — HYDROCORTISONE SODIUM SUCCINATE 80 MG: 100 INJECTION, POWDER, FOR SOLUTION INTRAMUSCULAR; INTRAVENOUS at 13:54

## 2018-07-10 RX ADMIN — HEPARIN SODIUM 5000 UNITS: 5000 INJECTION, SOLUTION INTRAVENOUS; SUBCUTANEOUS at 06:33

## 2018-07-10 NOTE — MEDICAL STUDENT
Progress Note - Critical Care   Radha Kirkland 66 y o  male MRN: [id number]  Unit/Bed#: ICU 11 Encounter: [id number]    Attending Physician: Adriana Hayes MD      ______________________________________________________________________  Assessment and Plan:   Principal Problem:    Septic shock [facility])  Active Problems:    Coronary artery disease involving native coronary artery of native heart    COPD (chronic obstructive pulmonary disease) ([address] )    Hyponatremia    Alcohol abuse    Tobacco abuse    Heart failure, systolic, due to idiopathic cardiomyopathy ([facility] )    Moderate protein-calorie malnutrition ([facility] )    Anemia    Intra-abdominal abscess ([facility] )    DEBBY (acute kidney injury) ([facility] )    UTI (urinary tract infection)  Resolved Problems:    * No resolved hospital problems   *        Neuro:   - Agitation likely 2/2 alcohol withdrawal: Hx DT during previous admission, repeatedly attempted to get out of bed, pulled out IJ triple lumen catheter last night   - Improved mentation and agitation with Cerax and Versed    - Transition Versed gtt 1mg/hr to Ativan  - Hallucinations: visual shadows and cats, no insight, patient does not perceive these as threatening, endorses animal hallucinations at home, no SI/HI   - Continue to monitor   - Consider Zyprexa, as he has been on it historically  - Pain: PO Oxycodone 10mg q4hr prn  - Trend GCS: currently 15    CV:   - Shock 2/2 hypovolemia, intra-abdominal abscess, UTI   - Levophed 3mcg/hr   - MAPs 56-88, currently 90    - MAP goal >65 mmHg  - Rhythm: currently NSR   - Monitor on Telemetry    Pulm:   - Maintaining 100% on 3L NC  - COPD: home oxygen at 3L   - Continue inhalers, nebs PRN    GI:   - Hx: Crohn's Colitis    - s/p hemicolectomy 2005   - 5/2018 transverse colon perforation, s/p EDITH, ileocolonic anastomosis, diverting ileostomy   - 6/26 intra-abdominal abscess, s/p IR drainage   - 7/7 LLQ pain, N/V, decreased PO intake, decreased urine and ostomy output, advised to ED by   [facility]NA  - SANTA output: 0  - Ostomy output: 1450 (bilious fluid)  - Collect SANTA drain culture for IR    :   - DEBBY 2/2 Shock: resolved, Cr 0 76  - UTI: 7/8 UA innumerable WBC, moderate bacteria   - Urine Cx pending  - UOP: 2 4L (1 6 cc/kg/hr)  - Fungal groin rash: Continue topical Nystatin    F/E/N:   - Fluids: 1/2 NS @ 125  - Diet: NPO  - Replete electrolyte goals: K>4 0, Mag>2 0, Phos>3 0  - Hyponatremia: resolved, current Na 136    ID:   - Source: intra-abdominal abscess, UTI  - Current Abx: Vancomycin 750mg + Zosyn 3 4g   - Day 2 Zosyn   - Day 3 Vanco  - Trend WBC and Temp    Heme:   - Hgb 7 7 from 8 8   - Type and Screen    - Prepare 2 units pRBC    - Transfuse 1 unit when Hgb <7 0    Endo:   - Glucose 129  - SSI as needed     Msk/Skin:   - Frequent Turn to prevent pressure ulceration  - Wound/Ostomy care as needed    Disposition: Continue ICU level care    Code Status: Level 1 - Full Code    Counseling / Coordination of Care  Total time spent today 30 minutes  Greater than 50% of total time was spent with the patient and / or family counseling and / or coordination of care  A description of the counseling / coordination of care:    ______________________________________________________________________    Chief Complaint: Septic Shock    24 Hour Events: Agitation last night refractory to PRN Ativan dose and Serax, given 4mg Versed, replaced R IJ TLC  Started on Versed gtt @ 1mcg  Placed in restraints which were removed in the morning with improved mentation  Patient denies any intent or memory of pulling out the IJ      Review of Systems  ______________________________________________________________________    Physical Exam:       ______________________________________________________________________  Vitals:    07/10/18 0100 07/10/18 0200 07/10/18 0300 07/10/18 0400   BP: 100/60      BP Location:       Pulse: 76 66 68 68   Resp: 14 13 13 13   Temp:    97 5 °F (36 4 °C)   TempSrc:    Oral   SpO2:       Weight: Height:           Temperature:   Temp (24hrs), Av 5 °F (36 4 °C), Min:97 2 °F (36 2 °C), Max:97 8 °F (36 6 °C)    Current Temperature: 97 5 °F (36 4 °C)  Weights:   IBW: 63 8 kg    Body mass index is 22 42 kg/m²  Weight (last 2 days)     Date/Time   Weight    18 0600  63 (138 89)            Hemodynamic Monitoring:  N/A     Non-Invasive/Invasive Ventilation Settings:  Respiratory    Lab Data (Last 4 hours)    None         O2/Vent Data (Last 4 hours)    None              No results found for: PHART, IUT1ISC, PO2ART, QTF8BTM, P1AUKLDC, BEART, SOURCE  SpO2: SpO2: 100 %  Intake and Outputs:  I/O       701 -  - 07/10 07    P  O   1220    I V  (mL/kg) 3466 (55) 2999 4 (47 6)    NG/GT 0 6    IV Piggyback 600 550 1    Total Intake(mL/kg) 4066 (64 5) 4775 5 (75 8)    Urine (mL/kg/hr) 3450 (2 3) 2225 (1 5)    Drains 0 0    Stool 250 1150    Total Output 3700 3375    Net +366 +1400 5              UOP: 1 6 ml/hr   Nutrition:        Diet Orders            Start     Ordered    18 1038  Diet Clear Liquid  Diet effective now     Question Answer Comment   Diet Type Clear Liquid    RD to adjust diet per protocol? Yes        18 1038          Labs:     Results from last 7 days  Lab Units 07/10/18  0521 18  0605 18  0724 18  0350  18  0233   WBC Thousand/uL 13 78* 13 31*  --   --   --  22 37*   HEMOGLOBIN g/dL 7 7* 8 8*  --   --   --  10 5*   I STAT HEMOGLOBIN g/dl  --   --   --  8 8*  < >  --    HEMATOCRIT % 24 8* 27 4*  --   --   --  31 8*   PLATELETS Thousands/uL 369 424* 499*  --   --  617*   NEUTROS PCT %  --  80*  --   --   --   --    MONOS PCT %  --  9  --   --   --   --    MONO PCT MAN %  --   --   --   --   --  5   < > = values in this interval not displayed      Results from last 7 days  Lab Units 07/10/18  0522 18  2047 18  1146  18  0422  18  0233   SODIUM mmol/L 136 136 134*  < >  --   < > 115*   POTASSIUM mmol/L 4 6 4 4 4 8  < >  --   < > 5 9*   CHLORIDE mmol/L 106 106 106  < >  --   < > 81*   CO2 mmol/L 23 23 19*  < >  --   < > 22   BUN mg/dL 11 18 26*  < >  --   < > 60*   CREATININE mg/dL 0 76 0 90 0 98  < >  --   < > 5 46*   CALCIUM mg/dL 8 3 7 9* 7 8*  < >  --   < > 8 7   TOTAL PROTEIN g/dL  --   --   --   --  6 2*  --  8 8*   BILIRUBIN TOTAL mg/dL  --   --   --   --  0 46  --  0 47   ALK PHOS U/L  --   --   --   --  103  --  132*   ALT U/L  --   --   --   --  9*  --  12   AST U/L  --   --   --   --  7  --  21   GLUCOSE RANDOM mg/dL 129 99 109  < >  --   < > 97   GLUCOSE, ISTAT   --   --   --   --   --   < >  --    < > = values in this interval not displayed      Results from last 7 days  Lab Units 07/09/18  0422 07/08/18  1500 07/08/18  1117   MAGNESIUM mg/dL 2 3 3 3* 1 0*     Lab Results   Component Value Date    PHOS 3 1 07/09/2018    PHOS 4 5 07/08/2018    PHOS 3 1 06/18/2018        Results from last 7 days  Lab Units 07/08/18  0233   INR  0 96   PTT seconds 27       0  Lab Value Date/Time   TROPONINI <0 02 06/13/2018 1512   TROPONINI 0 12 (H) 05/20/2018 1753   TROPONINI 0 17 (H) 05/20/2018 1505   TROPONINI 0 17 (H) 05/20/2018 1214   TROPONINI 0 29 (H) 05/19/2018 0438   TROPONINI 0 30 (H) 05/19/2018 0154   TROPONINI 0 32 (H) 05/18/2018 2229   TROPONINI 0 32 (H) 05/18/2018 1909   TROPONINI 1 15 (H) 05/16/2018 1416   TROPONINI 1 24 (H) 05/16/2018 0522   TROPONINI 1 16 (H) 05/16/2018 0209   TROPONINI 1 09 (H) 05/16/2018 0053   TROPONINI 0 85 (H) 05/15/2018 1935   TROPONINI 0 72 (H) 05/15/2018 1728   TROPONINI 0 47 (H) 05/15/2018 1304   TROPONINI 0 02 12/07/2016 1931   TROPONINI 0 04 (H) 12/07/2016 0907   TROPONINI <0 04 05/22/2015 0123       Results from last 7 days  Lab Units 07/08/18  0233   LACTIC ACID mmol/L 1 2     ABG:  Lab Results   Component Value Date    PHART 7 443 05/26/2018    FQA2MBE 46 1 (H) 05/26/2018    PO2ART 72 4 (L) 05/26/2018    SMH1FXA 30 8 (H) 05/26/2018    BEART 5 9 05/26/2018    SOURCE Radial, Left 05/26/2018     Imaging:  I have personally reviewed pertinent reports  EKG: Telemetry, SR  Micro:  Lab Results   Component Value Date    BLOODCX No Growth at 24 hrs  07/08/2018    BLOODCX No Growth at 24 hrs  07/08/2018    BLOODCX No Growth After 5 Days  06/13/2018    BLOODCX No Growth After 5 Days  06/13/2018    URINECX Culture too young- will reincubate 07/08/2018    URINECX >100,000 cfu/ml Candida sp  presumptively albicans (A) 06/13/2018    URINECX 1588-4438 cfu/ml  06/13/2018     Allergies:    Allergies   Allergen Reactions    Other      Brown cloth band aids       Latex Rash     Medications:   Scheduled Meds:  Current Facility-Administered Medications:  albuterol 2 5 mg Nebulization Q6H PRN Keara Hawk MD    dexmedetomidine 0 1-0 7 mcg/kg/hr Intravenous Titrated AMALIA Hartley Last Rate: 0 7 mcg/kg/hr (07/09/18 2254)   docusate sodium 100 mg Oral Daily Marine EBONY Duron    fentanyl citrate (PF) 50 mcg Intravenous Q2H PRN Skyla Mcgraw PA-C    folic acid IVPB 1 mg Intravenous Daily Koki Jose MD Last Rate: Stopped (07/09/18 1038)   heparin (porcine) 5,000 Units Subcutaneous [facility] Koki Jose MD    hydrocortisone sodium succinate 80 mg Intravenous Q8H [facility] Skyla Mcgraw PA-C    levalbuterol 1 25 mg Nebulization TID Keara Hawk MD    LORazepam 1 mg Intravenous Q4H PRN AMALIA Hartley    midazolam 1 mg/hr Intravenous Continuous Irving Mason MD Last Rate: 1 mg/hr (07/10/18 0100)   nicotine 7 mg Transdermal Daily Koki Jose MD    norepinephrine 1-30 mcg/min Intravenous Titrated Winston Galravi DO Last Rate: 4 mcg/min (07/09/18 2231)   nystatin  Topical BID Skyla Mcgraw PA-C    oxazepam 10 mg Oral Q6H [facility] Skyla Mcgraw PA-C    oxyCODONE 5 mg Oral Q4H PRN Skyla Mcgraw PA-C    Or        oxyCODONE 10 mg Oral Q4H PRN Skyla Mcgraw PA-C    piperacillin-tazobactam 3 375 g Intravenous Q6H Skyla Mcgraw PA-C Last Rate: 3 375 g (07/10/18 0330)   senna 1 tablet Oral HS Carlos Duke PA-C    sodium chloride 3 mL Nebulization TID Riya Granger MD    sodium chloride 125 mL/hr Intravenous Continuous Skyla Mcgraw PA-C Last Rate: 125 mL/hr (07/09/18 2254)   tiotropium 18 mcg Inhalation Daily Georgette Jane MD    vancomycin 12 5 mg/kg Intravenous Q12H Skyla Mcgraw PA-C Last Rate: 750 mg (07/10/18 0300)     Continuous Infusions:  dexmedetomidine 0 1-0 7 mcg/kg/hr Last Rate: 0 7 mcg/kg/hr (07/09/18 2254)   midazolam 1 mg/hr Last Rate: 1 mg/hr (07/10/18 0100)   norepinephrine 1-30 mcg/min Last Rate: 4 mcg/min (07/09/18 2231)   sodium chloride 125 mL/hr Last Rate: 125 mL/hr (07/09/18 2254)     PRN Meds:    albuterol 2 5 mg Q6H PRN   fentanyl citrate (PF) 50 mcg Q2H PRN   LORazepam 1 mg Q4H PRN   oxyCODONE 5 mg Q4H PRN   Or     oxyCODONE 10 mg Q4H PRN     VTE Pharmacologic Prophylaxis: Heparin  VTE Mechanical Prophylaxis: sequential compression device  Invasive lines and devices: Invasive Devices     Central Venous Catheter Line            CVC Central Lines 07/09/18 Triple Right Internal jugular less than 1 day          Peripheral Intravenous Line            Peripheral IV 07/09/18 Left Antecubital less than 1 day          Arterial Line            Arterial Line 07/08/18 Radial 1 day          Drain            Colostomy  RUQ 48 days    Closed/Suction Drain Left Abdomen Bulb 8 5 Fr  12 days                     Portions of the record may have been created with voice recognition software  Occasional wrong word or "sound a like" substitutions may have occurred due to the inherent limitations of voice recognition software  Read the chart carefully and recognize, using context, where substitutions have occurred      [facility]

## 2018-07-10 NOTE — PROCEDURES
Central Line Insertion  Date/Time: 7/9/2018 10:22 PM  Performed by: Rafia Benavides  Authorized by: Rafia Benavides     Patient location:  Bedside  Consent:     Consent obtained:  Emergent situation  Universal protocol:     Patient identity confirmed:  Arm band and provided demographic data  Pre-procedure details:     Hand hygiene: Hand hygiene performed prior to insertion      Sterile barrier technique: All elements of maximal sterile technique followed      Skin preparation:  ChloraPrep  Indications:     Central line indications: medications requiring central line    Anesthesia (see MAR for exact dosages): Anesthesia method:  Local infiltration    Local anesthetic:  Lidocaine 1% w/o epi  Procedure details:     Location:  Right internal jugular    Vessel type: vein      Laterality:  Right    Patient position:  Flat    Catheter type:  Triple lumen 16cm    Landmarks identified: yes      Ultrasound guidance: yes      Sterile ultrasound techniques: Sterile gel and sterile probe covers were used      Number of attempts:  1    Successful placement: yes    Post-procedure details:     Post-procedure:  Dressing applied and line sutured    Assessment:  Blood return through all ports, free fluid flow, no pneumothorax on x-ray and placement verified by x-ray    Post-procedure complications: none      Patient tolerance of procedure:   Tolerated well, no immediate complications

## 2018-07-10 NOTE — PROGRESS NOTES
Patient was agitated and attempting to climb out of the bed repeatedly  Additionally, patient removed his R IJ TLC  PRN ativan was given with no improvement and had recently received serax dose  Due to patient remaining on levo and precedex, decision was made to re-insert R IJ TLC  Versed 2 mg x 2 was given for line placement  Agitation was improved and Versed gtt will be started at 1 mg

## 2018-07-10 NOTE — PROGRESS NOTES
Progress Note - Critical Care   Dorette Brunner 66 y o  male MRN: [id number]  Unit/Bed#: ICU 11 Encounter: [id number]    Impression:  Principal Problem:    Septic shock ([facility] [address] )  Active Problems:    Coronary artery disease involving native coronary artery of native heart    COPD (chronic obstructive pulmonary disease) (HCC)    Hyponatremia    Alcohol abuse    Tobacco abuse    Heart failure, systolic, due to idiopathic cardiomyopathy (HCC)    Moderate protein-calorie malnutrition (HCC)    Anemia    Intra-abdominal abscess (HCC)    DEBBY (acute kidney injury) ([facility] [address] )    UTI (urinary tract infection)      Plan:    Neuro:   · Encephalopathy - Presumed alcohol withdrawal - hx of DT's last admission  Continues to have hallucinations despite increasing benzo's  · Increased agitation/tremors overnight, received 2 mg x 2 of versed to replace R IJ TLC  Started on versed gtt at 1 mg  · Plan to change over to ativan gtt at 1mg, with PRN 1 mg q4h  · Continue serax 10 mg S4D  · Continue folic acid/thiamine daily  · Consider haldol/zyprexa if no improvement  · Pain controlled with: PRN fentanyl, PO oxycodone  · Regulate sleep/wake cycle  · Trend neuro exam - GCS 14, -1 for confusion    CV:   · Shock 2/2 hypovolemia and intraabdominal abscesses, UTI  · Cardiac infusions: Levophed, 3 mcg/min  · Stress dose steroids - solucortef started yesterday - 80 mg q8h  · Consider midodrine today  · MAP goal > 65  · Rhythm: NSR  · Follow rhythm on telemetry    Resp:   · No issues  Maintaining adequate O2 sats on NC at 3 L    · History of COPD, for which he wears oxygen at home (2-3L)  · Continue inhalers, nebs PRN    GI:   · Hx of Crohn's colitis - s/p hemicolectomy in 2005  May 2018 had transverse colon perforation due to bowel ischemia - s/p repair, anastomosis, and diverting ileostomy   Since then has had chronic intraabdominal abscesses due to anastomosis leak  · S/p SANTA drain placed by IR on 6/27  · CT scan 7/8 -  Demonstrated persistent fluid collections, some resolved/improved  · Follow-up with colorectal surgery regarding plan - no plan as of now to attempt further fluid collection drainage  · Colostomy with 1,150 ml stool in 24 hrs  · SANTA drain 0 ml in 24 hrs  · Stress ulcer prophylaxis: not indicated  · Bowel regimen: dulcolax, senna    F/E/N:   · Fluid/Diuretic plan: 1/2 NS at 125ml/hr  · Nutrition/diet plan: Clear liquid  · Replete electrolytes with goals: K >4 0, Mag >2 0, and Phos >3 0  · Hyponatremia - Resolved  NA initially 115  Stable at 136    :   · DEBBY - Resolved  Creatinine initally 5 4, trending down to 0 76  · Hernandez removed yesterday  · Fungal groin rash - nystatin ointment  · UTI - UA with leukocytes and bacteria  Culture pending  · 2 2 L UOP in 24 hrs  Trend UOP and BUN/creat  · Strict I and O    ID:   · Source of infection: Intraabdominal abscess, UTI  · Abx ordered:  Vanco and zosyn  · Day # 3, Day #2 Vanco  · Blood cultures - no growth at 24 hours  · UA culture - pending   · SANTA drain culture - to be collected today  · Afebrile  Trend temps and WBC count  · Maintain normothermia    Heme:   · Hemoglobin trending down 7 7 from 8 8  · Consider PRBC today  · Trend hgb and plts  · Transfuse as needed for goal hgb > 8    Endo:   · Glycemic control plan: BS under control at this time, SSI PRN    MSK/Skin:  · Frequent turning and pressure off-loading  · Local wound care as needed    Lines:  · Central venous access: R IJ TLC  · Arterial line: R radial    VTE Prophylaxis:  · Pharmacologic Prophylaxis: Heparin  · Mechanical Prophylaxis: sequential compression device    Disposition: Continue ICU care    Consultants:  Colorectal surgery    Reason for Admission: Shock likely 2/2 sepsis in the setting of chronic intraabdominal abscesses  HPI/24hr events: Overnight, was agitated and delirious, R IJ TLC pulled out  PRN's given with no improvement   Received versed 2 mg x 2 for line placement, then started on versed gtt at 1mg    Physical Exam:    Physical Exam   Constitutional: He appears well-developed and well-nourished  He appears distressed  HENT:   Head: Normocephalic and atraumatic  Eyes: EOM are normal  Pupils are equal, round, and reactive to light  Neck: Normal range of motion  Neck supple  R IJ TLC present, dressing in place   Cardiovascular: Normal rate, regular rhythm and intact distal pulses  No murmur heard  Pulmonary/Chest: Effort normal  No respiratory distress  He has no wheezes  He has no rales  Rhonchi and rales heard bilaterally   Abdominal: Soft  He exhibits no distension  There is no tenderness  Musculoskeletal: Normal range of motion  He exhibits no edema  Neurological: He is alert  GCS 14, -1 for confusion   Skin: Skin is warm and dry  Psychiatric: He has a normal mood and affect  His behavior is normal        Vitals:   Vitals:    07/10/18 0600 07/10/18 0700 07/10/18 0724 07/10/18 0825   BP:    123/73   BP Location:    Left arm   Pulse: 70 70  78   Resp: (!) 11 12  (!) 23   Temp:    98 °F (36 7 °C)   TempSrc:    Oral   SpO2: 100% 100% 100% 100%   Weight:       Height:         Arterial Line BP: 121/68  Arterial Line MAP (mmHg): 90 mmHg    Temperature: Temp (24hrs), Av 7 °F (36 5 °C), Min:97 5 °F (36 4 °C), Max:98 °F (36 7 °C)  Current: Temperature: 98 °F (36 7 °C)    Weights: IBW: 63 8 kg  Body mass index is 22 42 kg/m²  Hemodynamic Monitoring    Respiratory:  SpO2: SpO2: 100 %, SpO2 Activity: SpO2 Activity: At Rest, SpO2 Device: O2 Device: Nasal cannula  O2 Flow Rate (L/min): 2 L/min    Intake and Outputs:    Intake/Output Summary (Last 24 hours) at 07/10/18 0842  Last data filed at 07/10/18 0600   Gross per 24 hour   Intake          4833 98 ml   Output             3600 ml   Net          1233 98 ml     I/O last 24 hours: In: 5159 5 [P O :1270;  I V :3283 4; NG/GT:6; IV Piggyback:600 1]  Out: 0691 [MQPWE:8759; WYROB:4077]    Stool:      Nutrition:        Diet Orders            Start     Ordered 07/09/18 1038  Diet Clear Liquid  Diet effective now     Question Answer Comment   Diet Type Clear Liquid    RD to adjust diet per protocol? Yes        07/09/18 1038          Labs:     Results from last 7 days  Lab Units 07/10/18  0521 07/09/18  0605 07/08/18  0724 07/08/18  0350  07/08/18  0233   WBC Thousand/uL 13 78* 13 31*  --   --   --  22 37*   HEMOGLOBIN g/dL 7 7* 8 8*  --   --   --  10 5*   I STAT HEMOGLOBIN g/dl  --   --   --  8 8*  < >  --    HEMATOCRIT % 24 8* 27 4*  --   --   --  31 8*   PLATELETS Thousands/uL 369 424* 499*  --   --  617*   NEUTROS PCT %  --  80*  --   --   --   --    MONOS PCT %  --  9  --   --   --   --    MONO PCT MAN %  --   --   --   --   --  5   < > = values in this interval not displayed  Results from last 7 days  Lab Units 07/10/18  0522 07/09/18  2047 07/09/18  1146  07/09/18  0422  07/08/18  0233   SODIUM mmol/L 136 136 134*  < >  --   < > 115*   POTASSIUM mmol/L 4 6 4 4 4 8  < >  --   < > 5 9*   CHLORIDE mmol/L 106 106 106  < >  --   < > 81*   CO2 mmol/L 23 23 19*  < >  --   < > 22   BUN mg/dL 11 18 26*  < >  --   < > 60*   CREATININE mg/dL 0 76 0 90 0 98  < >  --   < > 5 46*   CALCIUM mg/dL 8 3 7 9* 7 8*  < >  --   < > 8 7   TOTAL PROTEIN g/dL  --   --   --   --  6 2*  --  8 8*   BILIRUBIN TOTAL mg/dL  --   --   --   --  0 46  --  0 47   ALK PHOS U/L  --   --   --   --  103  --  132*   ALT U/L  --   --   --   --  9*  --  12   AST U/L  --   --   --   --  7  --  21   GLUCOSE RANDOM mg/dL 129 99 109  < >  --   < > 97   GLUCOSE, ISTAT   --   --   --   --   --   < >  --    < > = values in this interval not displayed      Results from last 7 days  Lab Units 07/09/18  0422 07/08/18  1500 07/08/18  1117   MAGNESIUM mg/dL 2 3 3 3* 1 0*       Results from last 7 days  Lab Units 07/09/18  0422 07/08/18  1117   PHOSPHORUS mg/dL 3 1 4 5        Results from last 7 days  Lab Units 07/08/18  0233   INR  0 96   PTT seconds 27       Results from last 7 days  Lab Units 07/08/18  0233   LACTIC ACID mmol/L 1 2       Imaging:   Ct Abdomen Pelvis Wo Contrast    Result Date: 7/8/2018  Impression: 1  Irregular tract containing gas adjacent to the ileocolic anastomosis has decreased in size however persistent sinus tract/leak is not excluded  2   Collection associated with the left-sided pigtail catheter is nearly resolved  3   Persistent mesenteric edema and fluid with loculated sub-3 cm collections likely involving the inferior tip of the spleen, near the aortic bifurcation, and in the right hemiabdomen are stable and may represent abscesses  Workstation performed: [id number]       Micro:   Blood Culture:   Lab Results   Component Value Date    BLOODCX No Growth at 48 hrs  07/08/2018    BLOODCX No Growth at 48 hrs  07/08/2018    BLOODCX No Growth After 5 Days  06/13/2018    BLOODCX No Growth After 5 Days  06/13/2018    BLOODCX No Growth After 5 Days  05/15/2018    BLOODCX No Growth After 5 Days  05/15/2018     Urine Culture:   Lab Results   Component Value Date    URINECX Culture too young- will reincubate 07/08/2018    URINECX >100,000 cfu/ml Candida sp  presumptively albicans (A) 06/13/2018    URINECX 7984-8611 cfu/ml  06/13/2018     Sputum Culture: No components found for: SPUTUMCX  Wound Culure: No results found for: Janet Joyceenstein    Results from last 7 days  Lab Units 07/08/18  0553 07/08/18  0234   BLOOD CULTURE   --  No Growth at 48 hrs  No Growth at 48 hrs  URINE CULTURE  Culture too young- will reincubate  --        Allergies:    Allergies   Allergen Reactions    Other      Brown cloth band aids       Latex Rash       Medications:   Scheduled Meds:    Current Facility-Administered Medications:  albuterol 2 5 mg Nebulization Q6H PRN Sweta Serna MD    docusate sodium 100 mg Oral Daily Diana Garcia PA-C    fentanyl citrate (PF) 50 mcg Intravenous Q2H PRN Skyla Mcgraw PA-C    folic acid IVPB 1 mg Intravenous Daily Mary Holland MD Last Rate: Stopped (07/09/18 1038)   heparin (porcine) 5,000 Units Subcutaneous [facility] Mary Holland MD    hydrocortisone sodium succinate 80 mg Intravenous Q8H [address] Skyla Mcgraw PA-C    levalbuterol 1 25 mg Nebulization TID Sweta Serna MD    LORazepam 1 mg Intravenous Q4H PRN AMALIA Valero    lorazepam 1 mg/hr Intravenous Continuous Skyla Mcgraw PA-C    midodrine 2 5 mg Oral TID AC Freddiehermila Garcia PA-C    nicotine 7 mg Transdermal Daily Mary Holland MD    norepinephrine 1-30 mcg/min Intravenous Titrated Megbetsy Vaughan DO Last Rate: 4 mcg/min (07/09/18 2231)   nystatin  Topical BID Skyla Mcgraw PA-C    oxazepam 10 mg Oral Q6H [address] Skyla Mcgraw PA-C    oxyCODONE 5 mg Oral Q4H PRN Skyla Mcgraw PA-C    Or        oxyCODONE 10 mg Oral Q4H PRN Skyla Mcgraw PA-C    piperacillin-tazobactam 3 375 g Intravenous Q6H Skyla Mcgraw PA-C Last Rate: 3 375 g (07/10/18 0330)   senna 1 tablet Oral HS Diana Garcia PA-C    sodium chloride 3 mL Nebulization TID Sweta Serna MD    sodium chloride 125 mL/hr Intravenous Continuous Skyla Mcgraw PA-C Last Rate: 125 mL/hr (07/09/18 2254)   tiotropium 18 mcg Inhalation Daily Mary Holland MD    vancomycin 12 5 mg/kg Intravenous Q12H Skyla Mcgraw PA-C Last Rate: 750 mg (07/10/18 0300)     Continuous Infusions:    lorazepam 1 mg/hr    norepinephrine 1-30 mcg/min Last Rate: 4 mcg/min (07/09/18 2231)   sodium chloride 125 mL/hr Last Rate: 125 mL/hr (07/09/18 2254)     PRN Meds:    albuterol 2 5 mg Q6H PRN   fentanyl citrate (PF) 50 mcg Q2H PRN   LORazepam 1 mg Q4H PRN   oxyCODONE 5 mg Q4H PRN   Or     oxyCODONE 10 mg Q4H PRN       Invasive lines and devices:   Invasive Devices     Central Venous Catheter Line            CVC Central Lines 07/09/18 Triple Right Internal jugular less than 1 day          Peripheral Intravenous Line            Peripheral IV 07/09/18 Left Antecubital less than 1 day          Arterial Line            Arterial Line 07/08/18 Radial 1 day Drain            Colostomy  RUQ 48 days    Closed/Suction Drain Left Abdomen Bulb 8 5 Fr  12 days                Code Status: Level 1 - Full Code    Counseling / Coordination of Care  Total Critical Care time spent 20 minutes excluding procedures, teaching and family updates  Portions of the record may have been created with voice recognition software  Occasional wrong word or "sound a like" substitutions may have occurred due to the inherent limitations of voice recognition software  Read the chart carefully and recognize, using context, where substitutions have occurred      SIGNATURE: Girma Landry PA-C  DATE: July 10, 2018  TIME: 8:42 AM

## 2018-07-10 NOTE — CASE MANAGEMENT
Pending auth [id number]  Blair Morrissey MD Resident Attested Colorectal Discharge Summaries Date of Service: 6/28/2018  3:07 PM      Attestation signed by Marisela Vazquez MD at 7/2/2018 7:32 AM           Teaching Physician Statement  I performed history and exam of patient  I discussed with the Resident  I agree with the resident's documented findings and plan of care                 []Hide copied text     Discharge Summary - Colorectal Surgery   Rena Diaz 66 y o  male MRN: [id number]  [facility] Encounter: [id number]     Admission Date:         Admission Orders      Ordered         06/13/18 2107   Inpatient Admission (expected length of stay for this patient is greater than two midnights)  Once                  Admitting Diagnosis: Dehydration [E86 0]  Hypotension [I95 9]  Perforation of colon ([address] ) [K63 1]  Acute renal failure, unspecified acute renal failure type ([address] ) [N17 9]  Leukocytosis, unspecified type [D72 829]  Presence of ileostomy ([address] ) [Z93 2]     HPI: Dg Snell a 66 y  o  male with past medical history significant for COPD, hypertension, coronary artery disease status post PCI in 2012, status post right hemicolectomy secondary to perforated transverse colon, alcohol abuse, tobacco abuse and ulcerative colitis/Crohn's who presents the emergency department this evening after having been evaluated by [facility] while he was at home and noted to have significant hypotension with a systolic blood pressure in the 60s   The patient at that point had no significant complaints and was asymptomatic   He did affirm some mild fatigue to the EMS staff  Myra Cruz the time of presentation to the emergency department his blood pressure was 66/41 and he was mentating appropriately with warm and well perfused extremities   At that point he appeared severely volume depleted and was started on aggressive IV fluid resuscitation  Jayne Almonte was evaluated with a bedside ultrasound which showed collapsibility of his IVC further indicating his severely volume depleted status   At this point was determined that the patient was appropriate for admission to the medicine service with the Critical Care Resource consultation  Per Ashkan Noble  Internal Medicine      Procedures Performed:       Orders Placed This Encounter   Procedures    ED ECG Documentation Only         Hospital Course:   Patient was admitted to the internal medicine service and transferred to medical critical care for hypotension, high output ileostomy, sepsis, DEBBY, alcohol withdrawal  He was on ambar-synephrine for hemodynamic support, given phenobarb for possible DT ppx, he had a left lung base infiltrate and started on IV abx, and on IV steroids  After 2 days in the MICU he weaned off pressors, blood cultures were negative for growth and he was transferred to the internal medicine SOD service  He was continued to be treated for HCAP w/ cefepime for 7 days       Colorectal surgery became involved because de has a history of a right hemicolectomy (Dr Byers Magda [id number]) that was complicated by exlap with repair of perforation by resection, primary anastomosis and diverting loop ileostomy  During his admission colorectal surgery was consulted for high ileostomy output  He was placed on imodium and metamucil as tolerated with a regular diet, he had ileostomy replacements for dehydration  His ileostomy output decreased to an acceptable level and imodium was d/c'd and fluids and he was continued to encourage metamucil usage  He also had an abdominal wound which was treated with wet to dry dressing changes during hospitalization, area is healthy and pink his whole admission       On hospital day 5 he developed a leukocytosis 21K  CT was done for a persistent leukocytosis and showed focus of free air around anastomosis, indicating a small anastomotic leak  Follow up CT on 6/24 showed several small collections and a possible sinus tract with stable free air   His abdomen remained non tender, ND, with bowel function  IR was consulted for drainage due to his persistent leukocytosis, an 8 5 drain was placed in the LUQ fluid collection, 17 cc pus was aspirated  The collection was close to a small bowel loop or possibly intramural-so there is a risk he could develop a fistula tract, he is diverted with his ileostomy so we proceeded with drainage  He completed an 8 day course of Zosyn and will be transitioned to PO abx      OT cleared him for home  PT cleared him for home PT vs STR and he  is adamant about going home  His WBC is trending downward, although remains elevated at 17 3, however his is adamant that he be discharged home today      He will follow up with Dr Vineet Martinez in 1 week  He will have a CBC/BMP in one week  He will finish a course of abx with 4 more days PO keflex/flagyl  He is given a pain prescription  [facility] is set up for his abdominal wound care and drain care       Significant Findings, Care, Treatment and Services Provided:   6/13 CTAP: LLL consolidation for PNA  No bowel obstruction ot abscess  6/14 ECHO: EF 50%, akinesis of the basal inferoseptal  6/18 C-diff: negative  6/20 CTAP:  Focus of free air adjacent to the mid abdominal bowel anastomosisDiffuse mesenteric edema likely reactive, bladder wall thickening left basilar effusion   6/24 CTAP: extra luminal gas unchanged, possible developing sinus tract, several small collections, larges 3 8x1 9, unchanged  Mesenteric edema    6/27 IR drainage: 17cc pus drained     Lab Results: I have personally reviewed pertinent lab results      Complications: abdominal abscesses     Discharge Diagnosis: Dehydration [E86 0]  Hypotension [I95 9]  Perforation of colon (HCC) [K63 1]  Acute renal failure, unspecified acute renal failure type ([facility] [address] ) [N17 9]  Leukocytosis, unspecified type [D72 829]  Presence of ileostomy ([facility] [address] ) [Z93 2]             Resolved Problems  Date Reviewed: 6/21/2018           Resolved     Acute kidney failure ([facility] [address] ) 6/20/2018       Resolved by  Chuy Weller MD     * (Principal)Hypotension 6/20/2018       Resolved by  Chuy Weller MD     Encephalopathy 6/20/2018       Resolved by  Chuy Weller MD     Dehydration 6/20/2018       Resolved by  Chuy Weller MD     Sepsis [facility]) 6/20/2018       Resolved by  Chuy Weller MD             Condition at Discharge: fair         Discharge instructions/Information to patient and family:   See after visit summary for information provided to patient and family        Provisions for Follow-Up Care:  See after visit summary for information related to follow-up care and any pertinent home health orders        Disposition: See After Visit Summary for discharge disposition information      Planned Readmission: Yes high likelihood due to multiple abdominal abscesses     Discharge Statement   I spent 30 minutes discharging the patient  This time was spent on the day of discharge  I had direct contact with the patient on the day of discharge  Additional documentation is required if more than 30 minutes were spent on discharge       Discharge Medications:  See after visit summary for reconciled discharge medications provided to patient and family                  Cosigned by:  Zeeshan Veras MD at 7/2/2018  7:32 AM   Revision History

## 2018-07-10 NOTE — PROGRESS NOTES
Will discontinue the Senna and colace since one of this patient's problems is high ileostomy output  Was previously on 0 5:1 replacement therapy for ileostomy output

## 2018-07-11 PROBLEM — N39.0 UTI (URINARY TRACT INFECTION): Status: RESOLVED | Noted: 2018-07-09 | Resolved: 2018-07-11

## 2018-07-11 LAB
ANION GAP SERPL CALCULATED.3IONS-SCNC: 7 MMOL/L (ref 4–13)
BASOPHILS # BLD AUTO: 0.01 THOUSANDS/ΜL (ref 0–0.1)
BASOPHILS NFR BLD AUTO: 0 % (ref 0–1)
BUN SERPL-MCNC: 5 MG/DL (ref 5–25)
CA-I BLD-SCNC: 1.17 MMOL/L (ref 1.12–1.32)
CALCIUM SERPL-MCNC: 8.4 MG/DL (ref 8.3–10.1)
CHLORIDE SERPL-SCNC: 108 MMOL/L (ref 100–108)
CO2 SERPL-SCNC: 24 MMOL/L (ref 21–32)
CREAT SERPL-MCNC: 0.77 MG/DL (ref 0.6–1.3)
EOSINOPHIL # BLD AUTO: 0 THOUSAND/ΜL (ref 0–0.61)
EOSINOPHIL NFR BLD AUTO: 0 % (ref 0–6)
ERYTHROCYTE [DISTWIDTH] IN BLOOD BY AUTOMATED COUNT: 15.9 % (ref 11.6–15.1)
GFR SERPL CREATININE-BSD FRML MDRD: 101 ML/MIN/1.73SQ M
GLUCOSE SERPL-MCNC: 104 MG/DL (ref 65–140)
HCT VFR BLD AUTO: 25.3 % (ref 36.5–49.3)
HGB BLD-MCNC: 7.9 G/DL (ref 12–17)
IMM GRANULOCYTES # BLD AUTO: 0.07 THOUSAND/UL (ref 0–0.2)
IMM GRANULOCYTES NFR BLD AUTO: 1 % (ref 0–2)
LYMPHOCYTES # BLD AUTO: 0.83 THOUSANDS/ΜL (ref 0.6–4.47)
LYMPHOCYTES NFR BLD AUTO: 10 % (ref 14–44)
MAGNESIUM SERPL-MCNC: 1.5 MG/DL (ref 1.6–2.6)
MCH RBC QN AUTO: 28.7 PG (ref 26.8–34.3)
MCHC RBC AUTO-ENTMCNC: 31.2 G/DL (ref 31.4–37.4)
MCV RBC AUTO: 92 FL (ref 82–98)
MONOCYTES # BLD AUTO: 0.38 THOUSAND/ΜL (ref 0.17–1.22)
MONOCYTES NFR BLD AUTO: 4 % (ref 4–12)
NEUTROPHILS # BLD AUTO: 7.41 THOUSANDS/ΜL (ref 1.85–7.62)
NEUTS SEG NFR BLD AUTO: 85 % (ref 43–75)
NRBC BLD AUTO-RTO: 0 /100 WBCS
PHOSPHATE SERPL-MCNC: 2.9 MG/DL (ref 2.7–4.5)
PLATELET # BLD AUTO: 380 THOUSANDS/UL (ref 149–390)
PMV BLD AUTO: 10.2 FL (ref 8.9–12.7)
POTASSIUM SERPL-SCNC: 4.1 MMOL/L (ref 3.5–5.3)
RBC # BLD AUTO: 2.75 MILLION/UL (ref 3.88–5.62)
SODIUM SERPL-SCNC: 139 MMOL/L (ref 136–145)
WBC # BLD AUTO: 8.7 THOUSAND/UL (ref 4.31–10.16)

## 2018-07-11 PROCEDURE — 85025 COMPLETE CBC W/AUTO DIFF WBC: CPT | Performed by: PHYSICIAN ASSISTANT

## 2018-07-11 PROCEDURE — 83735 ASSAY OF MAGNESIUM: CPT | Performed by: PHYSICIAN ASSISTANT

## 2018-07-11 PROCEDURE — 80048 BASIC METABOLIC PNL TOTAL CA: CPT | Performed by: PHYSICIAN ASSISTANT

## 2018-07-11 PROCEDURE — 82330 ASSAY OF CALCIUM: CPT | Performed by: PHYSICIAN ASSISTANT

## 2018-07-11 PROCEDURE — 84100 ASSAY OF PHOSPHORUS: CPT | Performed by: PHYSICIAN ASSISTANT

## 2018-07-11 PROCEDURE — 94760 N-INVAS EAR/PLS OXIMETRY 1: CPT

## 2018-07-11 PROCEDURE — 99232 SBSQ HOSP IP/OBS MODERATE 35: CPT | Performed by: SURGERY

## 2018-07-11 PROCEDURE — 94640 AIRWAY INHALATION TREATMENT: CPT

## 2018-07-11 RX ORDER — FENTANYL CITRATE 50 UG/ML
50 INJECTION, SOLUTION INTRAMUSCULAR; INTRAVENOUS EVERY 4 HOURS PRN
Status: DISCONTINUED | OUTPATIENT
Start: 2018-07-11 | End: 2018-07-12

## 2018-07-11 RX ORDER — MAGNESIUM SULFATE HEPTAHYDRATE 40 MG/ML
2 INJECTION, SOLUTION INTRAVENOUS ONCE
Status: COMPLETED | OUTPATIENT
Start: 2018-07-11 | End: 2018-07-11

## 2018-07-11 RX ADMIN — HEPARIN SODIUM 5000 UNITS: 5000 INJECTION, SOLUTION INTRAVENOUS; SUBCUTANEOUS at 06:21

## 2018-07-11 RX ADMIN — MIDODRINE HYDROCHLORIDE 2.5 MG: 5 TABLET ORAL at 07:17

## 2018-07-11 RX ADMIN — TIOTROPIUM BROMIDE 18 MCG: 18 CAPSULE ORAL; RESPIRATORY (INHALATION) at 08:05

## 2018-07-11 RX ADMIN — FENTANYL CITRATE 50 MCG: 50 INJECTION, SOLUTION INTRAMUSCULAR; INTRAVENOUS at 02:25

## 2018-07-11 RX ADMIN — HYDROCORTISONE SODIUM SUCCINATE 80 MG: 100 INJECTION, POWDER, FOR SOLUTION INTRAMUSCULAR; INTRAVENOUS at 13:11

## 2018-07-11 RX ADMIN — MAGNESIUM SULFATE HEPTAHYDRATE 2 G: 40 INJECTION, SOLUTION INTRAVENOUS at 08:04

## 2018-07-11 RX ADMIN — OXAZEPAM 10 MG: 10 CAPSULE, GELATIN COATED ORAL at 23:03

## 2018-07-11 RX ADMIN — LEVALBUTEROL HYDROCHLORIDE 1.25 MG: 1.25 SOLUTION, CONCENTRATE RESPIRATORY (INHALATION) at 08:09

## 2018-07-11 RX ADMIN — LEVALBUTEROL HYDROCHLORIDE 1.25 MG: 1.25 SOLUTION, CONCENTRATE RESPIRATORY (INHALATION) at 19:14

## 2018-07-11 RX ADMIN — LORAZEPAM 1 MG: 2 INJECTION INTRAMUSCULAR; INTRAVENOUS at 15:06

## 2018-07-11 RX ADMIN — OXYCODONE HYDROCHLORIDE 10 MG: 10 TABLET ORAL at 13:29

## 2018-07-11 RX ADMIN — OXAZEPAM 10 MG: 10 CAPSULE, GELATIN COATED ORAL at 07:17

## 2018-07-11 RX ADMIN — SODIUM CHLORIDE 125 ML/HR: 0.45 INJECTION, SOLUTION INTRAVENOUS at 13:10

## 2018-07-11 RX ADMIN — OXAZEPAM 10 MG: 10 CAPSULE, GELATIN COATED ORAL at 11:14

## 2018-07-11 RX ADMIN — HEPARIN SODIUM 5000 UNITS: 5000 INJECTION, SOLUTION INTRAVENOUS; SUBCUTANEOUS at 23:00

## 2018-07-11 RX ADMIN — NICOTINE 7 MG: 7 PATCH, EXTENDED RELEASE TRANSDERMAL at 08:06

## 2018-07-11 RX ADMIN — PIPERACILLIN SODIUM AND TAZOBACTAM SODIUM 3.38 G: 36; 4.5 INJECTION, POWDER, FOR SOLUTION INTRAVENOUS at 02:25

## 2018-07-11 RX ADMIN — SODIUM CHLORIDE 125 ML/HR: 0.45 INJECTION, SOLUTION INTRAVENOUS at 04:01

## 2018-07-11 RX ADMIN — ISODIUM CHLORIDE 3 ML: 0.03 SOLUTION RESPIRATORY (INHALATION) at 13:49

## 2018-07-11 RX ADMIN — FOLIC ACID 1 MG: 5 INJECTION, SOLUTION INTRAMUSCULAR; INTRAVENOUS; SUBCUTANEOUS at 08:04

## 2018-07-11 RX ADMIN — HYDROCORTISONE SODIUM SUCCINATE 80 MG: 100 INJECTION, POWDER, FOR SOLUTION INTRAMUSCULAR; INTRAVENOUS at 23:00

## 2018-07-11 RX ADMIN — LEVALBUTEROL HYDROCHLORIDE 1.25 MG: 1.25 SOLUTION, CONCENTRATE RESPIRATORY (INHALATION) at 13:49

## 2018-07-11 RX ADMIN — ISODIUM CHLORIDE 3 ML: 0.03 SOLUTION RESPIRATORY (INHALATION) at 08:09

## 2018-07-11 RX ADMIN — ISODIUM CHLORIDE 3 ML: 0.03 SOLUTION RESPIRATORY (INHALATION) at 19:14

## 2018-07-11 RX ADMIN — OXYCODONE HYDROCHLORIDE 10 MG: 10 TABLET ORAL at 07:17

## 2018-07-11 RX ADMIN — HYDROCORTISONE SODIUM SUCCINATE 80 MG: 100 INJECTION, POWDER, FOR SOLUTION INTRAMUSCULAR; INTRAVENOUS at 06:20

## 2018-07-11 RX ADMIN — FENTANYL CITRATE 50 MCG: 50 INJECTION, SOLUTION INTRAMUSCULAR; INTRAVENOUS at 04:41

## 2018-07-11 RX ADMIN — NYSTATIN: 100000 OINTMENT TOPICAL at 08:05

## 2018-07-11 RX ADMIN — VANCOMYCIN HYDROCHLORIDE 1250 MG: 10 INJECTION, POWDER, LYOPHILIZED, FOR SOLUTION INTRAVENOUS at 00:04

## 2018-07-11 RX ADMIN — OXAZEPAM 10 MG: 10 CAPSULE, GELATIN COATED ORAL at 17:39

## 2018-07-11 RX ADMIN — HEPARIN SODIUM 5000 UNITS: 5000 INJECTION, SOLUTION INTRAVENOUS; SUBCUTANEOUS at 13:11

## 2018-07-11 RX ADMIN — LORAZEPAM 1 MG: 2 INJECTION INTRAMUSCULAR; INTRAVENOUS at 02:19

## 2018-07-11 RX ADMIN — MIDODRINE HYDROCHLORIDE 2.5 MG: 5 TABLET ORAL at 11:14

## 2018-07-11 RX ADMIN — NYSTATIN: 100000 OINTMENT TOPICAL at 17:39

## 2018-07-11 NOTE — PROGRESS NOTES
Progress Note - Critical Care   Madelin Matta 66 y o  male MRN: [id number]  Unit/Bed#: [facility] 516-01 Encounter: [id number]    Assessment:   Patient Active Problem List   Diagnosis    Colitis    Spinal stenosis of cervical region    Hypertension    Pericarditis    Coronary artery disease involving native coronary artery of native heart    Leukocytosis    Asthma    Atherosclerosis of coronary artery    COPD (chronic obstructive pulmonary disease) ([facility]    Dyslipidemia    Septic shock (HCC)    Hyponatremia    Alcohol abuse    Tobacco abuse    [facility] dependence with withdrawal ([facility]    Acute respiratory insufficiency    Agitation    Heart failure, systolic, due to idiopathic cardiomyopathy ([facility]    Ileostomy in place ([facility]    Moderate protein-calorie malnutrition (HCC)    Anemia    Hypocalcemia    [facility] of [facility]    Intra-abdominal abscess ([facility]    DEBBY (acute kidney injury) ([facility]         Plan: Critical Care Management as outlined below:     Neuro:  Continued delirium with intermittent hallucinations  Oriented to person only  Encephalopathy likely secondary to alcohol withdrawal   History of DTs  Currently on Ativan infusion 1 milligram/hour  Received Ativan 1 mg p r n  x3, oxycodone 10 mg x3 and fentanyl 50 mcg x4  Currently complains of knee pain although when asked also complains of abdominal pain  Will decreased frequency of fentanyl, continue p r n  oxycodone  Continue syrinx  CV:  Hypovolemic shock resolved  Has remained off Levophed since yesterday  Currently on midodrine 2 5 mg q 8 hours  Attempt to discontinue midodrine today  Day 2 hydrocortisone, will discontinue after total 3 days  Vital signs normal  Continue to monitor  Lung:  No acute pulmonary issues  Doing well on nasal cannula  History of COPD with home O2 use  Continue pulmonary toilet  History of nicotine abuse, continue patch  GI:  History of Crohn's colitis    Status post hemicolectomy 2005   Status post repair of colon perforation and diverting ileostomy in May of this year  Has had increased ileostomy output and chronic intra-abdominal abscesses  SANTA drain in 1 of these abscesses with total 9 mL output in 24 hours  No cultures obtained of this fluid  1 L ileostomy output in 24 hours  Bowel regimen discontinued secondary to increased ileostomy output  Surgery following  No evidence of significant intra-abdominal infection and antibiotics discontinued today  FEN:  1 3 L positive in 24 hours  4 6 L positive since admission  Significant dehydration on admission now appears significantly improved  Significant hyponatremia on admission now resolved  Moderate hypomagnesemia, will replete  Remaining electrolytes normal   Continue to monitor and replete as needed  Tolerating clear liquid diet, will advance to regular diet per surgery recommendations today  :  BUN and creatinine normal   Good urine output  ID:  Afebrile  No leukocytosis  Discontinue antibiotics today  Heme:  H&H, platelets stable  Continue subcutaneous heparin and SCDs for DVT prophylaxis  Endo:  History of diet controled type 2 diabetes  Blood glucose has been normal over past several days  Continue to monitor closely with advancement of diet  Msk/Skin:  No skin breakdown  Continue frequent repositioning and offloading  Disposition:  Transfer to step-down level of care today     ______________________________________________________________________    Chief Complaint:  Knee pain, abdominal pain  HPI/24hr events:  Patient continues to have delirium with intermittent hallucinations  ______________________________________________________________________    Physical Exam:   Physical Exam   Constitutional: Vital signs are normal  He appears well-developed  He is cooperative  Non-toxic appearance  He has a sickly appearance  He appears ill  No distress  He is restrained  Cardiovascular: Regular rhythm, intact distal pulses and normal pulses  No extrasystoles are present  Pulmonary/Chest: Effort normal and breath sounds normal  No tachypnea  No respiratory distress  Abdominal: Soft  Normal appearance  He exhibits no distension  There is generalized tenderness  There is no rigidity, no rebound and no guarding  Neurological: He is alert  He has normal strength  GCS eye subscore is 4  GCS verbal subscore is 4  GCS motor subscore is 6  Skin: Skin is warm, dry and intact              ______________________________________________________________________  Vitals:    18 0400 18 0500 18 0553 18 0600   BP: 123/72 120/75  134/77   Pulse: 74 82  92   Resp: 12 (!)   18   Temp:       TempSrc:       SpO2: 93% 94%  99%   Weight:   63 kg (138 lb 14 2 oz)    Height:         Arterial Line BP: 110/92  Arterial Line MAP (mmHg): 100 mmHg     Temperature:   Temp (24hrs), Av °F (36 7 °C), Min:97 3 °F (36 3 °C), Max:99 °F (37 2 °C)    Current Temperature: (!) 97 3 °F (36 3 °C)  Weights:   IBW: 63 8 kg    Body mass index is 22 42 kg/m²  Weight (last 2 days)     Date/Time   Weight    18 0553  63 (138 89)            Hemodynamic Monitoring:  N/A     Non-Invasive/Invasive Ventilation Settings:  Respiratory    Lab Data (Last 4 hours)    None         O2/Vent Data (Last 4 hours)    None              No results found for: PHART, AZA2WRG, PO2ART, TON4WYV, O2VAIBVE, BEART, SOURCE  SpO2: SpO2: 99 %, SpO2 Activity: SpO2 Activity: At Rest, SpO2 Device: O2 Device: None (Room air)  Intake and Outputs:  I/O       701 - 07/10 0700 07/10 07 -  0700  07 -  0700    P  O  1270 120     I V  (mL/kg) 3283 4 (52 1) 3260 7 (51 8)     NG/GT 6 29     IV Piggyback 600 1 400     Total Intake(mL/kg) 5159 5 (81 9) 3809 7 (60 5)     Urine (mL/kg/hr) 2425 (1 6) 1500 (1)     Drains 0 9     Stool 1450 1025     Total Output 3875 2534      Net +1284 5 +1275 7 Unmeasured Urine Occurrence  3 x         UOP:  1 milliliter/kilogram/hour   Nutrition:        Diet Orders            Start     Ordered    07/09/18 1038  Diet Clear Liquid  Diet effective now     Question Answer Comment   Diet Type Clear Liquid    RD to adjust diet per protocol? Yes        07/09/18 1038          Labs:     Results from last 7 days  Lab Units 07/11/18  0437 07/10/18  1410 07/10/18  0521 07/09/18  0605   WBC Thousand/uL 8 70  --  13 78* 13 31*   HEMOGLOBIN g/dL 7 9* 7 8* 7 7* 8 8*   HEMATOCRIT % 25 3* 24 0* 24 8* 27 4*   PLATELETS Thousands/uL 380  --  369 424*   NEUTROS PCT % 85*  --  94* 80*   MONOS PCT % 4  --  1* 9      Results from last 7 days  Lab Units 07/11/18  0437 07/10/18  0522 07/09/18  2047  07/09/18  0422  07/08/18  0233   SODIUM mmol/L 139 136 136  < >  --   < > 115*   POTASSIUM mmol/L 4 1 4 6 4 4  < >  --   < > 5 9*   CHLORIDE mmol/L 108 106 106  < >  --   < > 81*   CO2 mmol/L 24 23 23  < >  --   < > 22   BUN mg/dL 5 11 18  < >  --   < > 60*   CREATININE mg/dL 0 77 0 76 0 90  < >  --   < > 5 46*   CALCIUM mg/dL 8 4 8 3 7 9*  < >  --   < > 8 7   TOTAL PROTEIN g/dL  --   --   --   --  6 2*  --  8 8*   BILIRUBIN TOTAL mg/dL  --   --   --   --  0 46  --  0 47   ALK PHOS U/L  --   --   --   --  103  --  132*   ALT U/L  --   --   --   --  9*  --  12   AST U/L  --   --   --   --  7  --  21   GLUCOSE RANDOM mg/dL 104 129 99  < >  --   < > 97   GLUCOSE, ISTAT   --   --   --   --   --   < >  --    < > = values in this interval not displayed      Results from last 7 days  Lab Units 07/11/18 0437 07/09/18  0422 07/08/18  1500   MAGNESIUM mg/dL 1 5* 2 3 3 3*       Results from last 7 days  Lab Units 07/11/18  0437 07/09/18  0422 07/08/18  1117   PHOSPHORUS mg/dL 2 9 3 1 4 5        Results from last 7 days  Lab Units 07/08/18  0233   INR  0 96   PTT seconds 27       Results from last 7 days  Lab Units 07/08/18  0233   LACTIC ACID mmol/L 1 2       0  Lab Value Date/Time   TROPONINI <0 02 06/13/2018 1512   TROPONINI 0 12 (H) 05/20/2018 1753   TROPONINI 0 17 (H) 05/20/2018 1505   TROPONINI 0 17 (H) 05/20/2018 1214   TROPONINI 0 29 (H) 05/19/2018 0438   TROPONINI 0 30 (H) 05/19/2018 0154   TROPONINI 0 32 (H) 05/18/2018 2229   TROPONINI 0 32 (H) 05/18/2018 1909   TROPONINI 1 15 (H) 05/16/2018 1416   TROPONINI 1 24 (H) 05/16/2018 0522   TROPONINI 1 16 (H) 05/16/2018 0209   TROPONINI 1 09 (H) 05/16/2018 0053   TROPONINI 0 85 (H) 05/15/2018 1935   TROPONINI 0 72 (H) 05/15/2018 1728   TROPONINI 0 47 (H) 05/15/2018 1304   TROPONINI 0 02 12/07/2016 1931   TROPONINI 0 04 (H) 12/07/2016 0907   TROPONINI <0 04 05/22/2015 0123     Imaging:  None today  I have personally reviewed pertinent reports  and I have personally reviewed pertinent films in PACS    Micro:  Lab Results   Component Value Date    BLOODCX No Growth at 48 hrs  07/08/2018    BLOODCX No Growth at 48 hrs  07/08/2018    BLOODCX No Growth After 5 Days  06/13/2018    BLOODCX No Growth After 5 Days  06/13/2018    URINECX Culture too young- will reincubate 07/08/2018    URINECX >100,000 cfu/ml Candida sp  presumptively albicans (A) 06/13/2018    URINECX 3525-8164 cfu/ml  06/13/2018     Allergies:    Allergies   Allergen Reactions    Other      Brown cloth band aids       Latex Rash     Medications:   Scheduled Meds:  Current Facility-Administered Medications:  albuterol 2 5 mg Nebulization Q6H PRN Marco Pereira MD    fentanyl citrate (PF) 50 mcg Intravenous Q2H PRN Skyla Mcgraw PA-C    folic acid IVPB 1 mg Intravenous Daily Jovita Kumar MD Last Rate: Stopped (07/10/18 1200)   heparin (porcine) 5,000 Units Subcutaneous [facility] Jovita Kumar MD    hydrocortisone sodium succinate 80 mg Intravenous Q8H [address] Skyla Mcgraw PA-C    levalbuterol 1 25 mg Nebulization TID Marco Pereira MD    LORazepam 1 mg Intravenous Q4H PRN AMALIA Giraldo    lorazepam 1 mg/hr Intravenous Continuous Skyla Mcgraw PA-C Last Rate: 1 mg/hr (07/10/18 1014)   midodrine 2 5 mg Oral TID AC Fransico Blackwood PA-C    nicotine 7 mg Transdermal Daily Rosalia Hankins MD    norepinephrine 1-30 mcg/min Intravenous Titrated Williams Gonzalez DO Last Rate: Stopped (07/10/18 1400)   nystatin  Topical BID Skyla Mcgraw PA-C    oxazepam 10 mg Oral Q6H [facility] Skyla Mcgraw PA-C    oxyCODONE 5 mg Oral Q4H PRN Skyla Mcgraw PA-C    Or        oxyCODONE 10 mg Oral Q4H PRN Skyla Mcgraw PA-C    piperacillin-tazobactam 3 375 g Intravenous Q6H Skyla Mcgraw PA-C Last Rate: Stopped (07/11/18 0400)   sodium chloride 3 mL Nebulization TID Marlene Ferris MD    sodium chloride 125 mL/hr Intravenous Continuous Skyla Mcgraw PA-C Last Rate: 125 mL/hr (07/11/18 0401)   tiotropium 18 mcg Inhalation Daily Rosalia Hankins MD    vancomycin 20 mg/kg Intravenous Q12H Aleena Sexton PA-C Last Rate: Stopped (07/11/18 0200)     Continuous Infusions:  lorazepam 1 mg/hr Last Rate: 1 mg/hr (07/10/18 1014)   norepinephrine 1-30 mcg/min Last Rate: Stopped (07/10/18 1400)   sodium chloride 125 mL/hr Last Rate: 125 mL/hr (07/11/18 0401)     PRN Meds:    albuterol 2 5 mg Q6H PRN   fentanyl citrate (PF) 50 mcg Q2H PRN   LORazepam 1 mg Q4H PRN   oxyCODONE 5 mg Q4H PRN   Or     oxyCODONE 10 mg Q4H PRN     VTE Pharmacologic Prophylaxis: Heparin  VTE Mechanical Prophylaxis: sequential compression device  Invasive lines and devices: Invasive Devices     Central Venous Catheter Line            CVC Central Lines 07/09/18 Triple Right Internal jugular 1 day          Peripheral Intravenous Line            Peripheral IV 07/09/18 Left Antecubital 1 day          Drain            Colostomy  RUQ 49 days    Closed/Suction Drain Left Abdomen Bulb 8 5 Fr  13 days                   Counseling / Coordination of Care  Total Critical Care time spent 36 minutes excluding procedures, teaching and family updates        Code Status: Level 1 - Full Code    Portions of the record may have been created with voice recognition software  Occasional wrong word or "sound a like" substitutions may have occurred due to the inherent limitations of voice recognition software  Read the chart carefully and recognize, using context, where substitutions have occurred      Maggie Jacobs PA-C

## 2018-07-11 NOTE — PROGRESS NOTES
Progress Note - Colorectal Surgery   Marilyn Lynn 66 y o  male MRN: [id number]  Unit/Bed#: [facility] 047-46 Encounter: [id number]    Assessment:  66 y o  M w/ hypotension suspect 2/2 severe dehydration; presented w/ hyponatremia and DEBBY; also with alcohol withdrawal    - Agitated overnight but better controlled with ativan infusion and PRNs  - Off pressors      Plan:  Follow ileostomy output  Continue IVF  Continue antibiotics  F/u BCx (no growth 2days), F/u UCx (too young re-incubated)  Hydrocortisone 80q8    Subjective/Objective   Subjective:   Agitation continues  Objective:     Blood pressure 123/72, pulse 74, temperature (!) 97 3 °F (36 3 °C), temperature source Axillary, resp  rate 12, height 5' 6" (1 676 m), weight 63 kg (138 lb 14 2 oz), SpO2 93 %  ,Body mass index is 22 42 kg/m²  Intake/Output Summary (Last 24 hours) at 07/11/18 0604  Last data filed at 07/11/18 0400   Gross per 24 hour   Intake          3541 78 ml   Output             2384 ml   Net          1157 78 ml       Invasive Devices     Central Venous Catheter Line            CVC Central Lines 07/09/18 Triple Right Internal jugular 1 day          Peripheral Intravenous Line            Peripheral IV 07/09/18 Left Antecubital 1 day          Drain            Colostomy  RUQ 49 days    Closed/Suction Drain Left Abdomen Bulb 8 5 Fr  13 days                Physical Exam:   Gen: NAD, AAOx3  CV: RRR  Pulm: no resp distress  Abd: Soft, non-distended, non-tender  Ileostomy with brown stool output   SANTA w/ scant drainage      Lab, Imaging and other studies:  CBC:   Lab Results   Component Value Date    WBC 8 70 07/11/2018    HGB 7 9 (L) 07/11/2018    HCT 25 3 (L) 07/11/2018    MCV 92 07/11/2018     07/11/2018    MCH 28 7 07/11/2018    MCHC 31 2 (L) 07/11/2018    RDW 15 9 (H) 07/11/2018    MPV 10 2 07/11/2018    NRBC 0 07/11/2018   , CMP:   Lab Results   Component Value Date     07/11/2018    K 4 1 07/11/2018     07/11/2018    CO2 24 07/11/2018    ANIONGAP 7 07/11/2018    BUN 5 07/11/2018    CREATININE 0 77 07/11/2018    GLUCOSE 104 07/11/2018    CALCIUM 8 4 07/11/2018    EGFR 101 07/11/2018   , Coagulation: No results found for: PT, INR, APTT  VTE Pharmacologic Prophylaxis: Heparin  VTE Mechanical Prophylaxis: sequential compression device

## 2018-07-12 PROBLEM — A41.9 SEPTIC SHOCK (HCC): Status: RESOLVED | Noted: 2018-05-15 | Resolved: 2018-07-12

## 2018-07-12 PROBLEM — N17.9 AKI (ACUTE KIDNEY INJURY) (HCC): Status: RESOLVED | Noted: 2018-07-08 | Resolved: 2018-07-12

## 2018-07-12 PROBLEM — R65.21 SEPTIC SHOCK (HCC): Status: RESOLVED | Noted: 2018-05-15 | Resolved: 2018-07-12

## 2018-07-12 LAB
ALBUMIN SERPL BCP-MCNC: 1.9 G/DL (ref 3.5–5)
ALP SERPL-CCNC: 84 U/L (ref 46–116)
ALT SERPL W P-5'-P-CCNC: 7 U/L (ref 12–78)
ANION GAP SERPL CALCULATED.3IONS-SCNC: 7 MMOL/L (ref 4–13)
AST SERPL W P-5'-P-CCNC: 5 U/L (ref 5–45)
BILIRUB SERPL-MCNC: 0.17 MG/DL (ref 0.2–1)
BUN SERPL-MCNC: 5 MG/DL (ref 5–25)
CALCIUM SERPL-MCNC: 8 MG/DL (ref 8.3–10.1)
CHLORIDE SERPL-SCNC: 109 MMOL/L (ref 100–108)
CO2 SERPL-SCNC: 23 MMOL/L (ref 21–32)
CREAT SERPL-MCNC: 0.74 MG/DL (ref 0.6–1.3)
GFR SERPL CREATININE-BSD FRML MDRD: 103 ML/MIN/1.73SQ M
GLUCOSE SERPL-MCNC: 135 MG/DL (ref 65–140)
MAGNESIUM SERPL-MCNC: 1.7 MG/DL (ref 1.6–2.6)
PHOSPHATE SERPL-MCNC: 2.6 MG/DL (ref 2.7–4.5)
POTASSIUM SERPL-SCNC: 3.8 MMOL/L (ref 3.5–5.3)
PROT SERPL-MCNC: 5.6 G/DL (ref 6.4–8.2)
SODIUM SERPL-SCNC: 139 MMOL/L (ref 136–145)

## 2018-07-12 PROCEDURE — 94668 MNPJ CHEST WALL SBSQ: CPT

## 2018-07-12 PROCEDURE — 99232 SBSQ HOSP IP/OBS MODERATE 35: CPT | Performed by: SURGERY

## 2018-07-12 PROCEDURE — 84100 ASSAY OF PHOSPHORUS: CPT | Performed by: PHYSICIAN ASSISTANT

## 2018-07-12 PROCEDURE — 94664 DEMO&/EVAL PT USE INHALER: CPT

## 2018-07-12 PROCEDURE — 83735 ASSAY OF MAGNESIUM: CPT | Performed by: PHYSICIAN ASSISTANT

## 2018-07-12 PROCEDURE — 94760 N-INVAS EAR/PLS OXIMETRY 1: CPT

## 2018-07-12 PROCEDURE — 80053 COMPREHEN METABOLIC PANEL: CPT | Performed by: PHYSICIAN ASSISTANT

## 2018-07-12 PROCEDURE — 94640 AIRWAY INHALATION TREATMENT: CPT

## 2018-07-12 RX ORDER — ATORVASTATIN CALCIUM 40 MG/1
40 TABLET, FILM COATED ORAL
Status: DISCONTINUED | OUTPATIENT
Start: 2018-07-13 | End: 2018-07-17 | Stop reason: HOSPADM

## 2018-07-12 RX ORDER — ACETAMINOPHEN 325 MG/1
650 TABLET ORAL EVERY 6 HOURS PRN
Status: DISCONTINUED | OUTPATIENT
Start: 2018-07-12 | End: 2018-07-17 | Stop reason: HOSPADM

## 2018-07-12 RX ORDER — POTASSIUM CHLORIDE 20 MEQ/1
20 TABLET, EXTENDED RELEASE ORAL ONCE
Status: COMPLETED | OUTPATIENT
Start: 2018-07-12 | End: 2018-07-12

## 2018-07-12 RX ORDER — MAGNESIUM SULFATE HEPTAHYDRATE 40 MG/ML
2 INJECTION, SOLUTION INTRAVENOUS ONCE
Status: COMPLETED | OUTPATIENT
Start: 2018-07-12 | End: 2018-07-12

## 2018-07-12 RX ORDER — OXAZEPAM 15 MG/1
15 CAPSULE ORAL EVERY 6 HOURS SCHEDULED
Status: DISCONTINUED | OUTPATIENT
Start: 2018-07-12 | End: 2018-07-13

## 2018-07-12 RX ORDER — OXYCODONE HYDROCHLORIDE 5 MG/1
5 TABLET ORAL EVERY 4 HOURS PRN
Status: DISCONTINUED | OUTPATIENT
Start: 2018-07-12 | End: 2018-07-17 | Stop reason: HOSPADM

## 2018-07-12 RX ORDER — ATORVASTATIN CALCIUM 40 MG/1
40 TABLET, FILM COATED ORAL
Status: DISCONTINUED | OUTPATIENT
Start: 2018-07-12 | End: 2018-07-12

## 2018-07-12 RX ORDER — FOLIC ACID 1 MG/1
1 TABLET ORAL DAILY
Status: DISCONTINUED | OUTPATIENT
Start: 2018-07-12 | End: 2018-07-17 | Stop reason: HOSPADM

## 2018-07-12 RX ORDER — THIAMINE MONONITRATE (VIT B1) 100 MG
100 TABLET ORAL DAILY
Status: DISCONTINUED | OUTPATIENT
Start: 2018-07-12 | End: 2018-07-17 | Stop reason: HOSPADM

## 2018-07-12 RX ORDER — AMIODARONE HYDROCHLORIDE 200 MG/1
200 TABLET ORAL
Status: DISCONTINUED | OUTPATIENT
Start: 2018-07-13 | End: 2018-07-17 | Stop reason: HOSPADM

## 2018-07-12 RX ADMIN — HEPARIN SODIUM 5000 UNITS: 5000 INJECTION, SOLUTION INTRAVENOUS; SUBCUTANEOUS at 05:54

## 2018-07-12 RX ADMIN — FENTANYL CITRATE 50 MCG: 50 INJECTION, SOLUTION INTRAMUSCULAR; INTRAVENOUS at 15:42

## 2018-07-12 RX ADMIN — OXYCODONE HYDROCHLORIDE 10 MG: 10 TABLET ORAL at 16:33

## 2018-07-12 RX ADMIN — OXAZEPAM 15 MG: 15 CAPSULE, GELATIN COATED ORAL at 19:00

## 2018-07-12 RX ADMIN — HEPARIN SODIUM 5000 UNITS: 5000 INJECTION, SOLUTION INTRAVENOUS; SUBCUTANEOUS at 23:07

## 2018-07-12 RX ADMIN — OXYCODONE HYDROCHLORIDE 5 MG: 5 TABLET ORAL at 20:38

## 2018-07-12 RX ADMIN — LEVALBUTEROL HYDROCHLORIDE 1.25 MG: 1.25 SOLUTION, CONCENTRATE RESPIRATORY (INHALATION) at 07:41

## 2018-07-12 RX ADMIN — ISODIUM CHLORIDE 3 ML: 0.03 SOLUTION RESPIRATORY (INHALATION) at 19:24

## 2018-07-12 RX ADMIN — OXAZEPAM 15 MG: 15 CAPSULE, GELATIN COATED ORAL at 23:07

## 2018-07-12 RX ADMIN — NYSTATIN: 100000 OINTMENT TOPICAL at 09:17

## 2018-07-12 RX ADMIN — TIOTROPIUM BROMIDE 18 MCG: 18 CAPSULE ORAL; RESPIRATORY (INHALATION) at 09:17

## 2018-07-12 RX ADMIN — MAGNESIUM SULFATE HEPTAHYDRATE 2 G: 40 INJECTION, SOLUTION INTRAVENOUS at 09:15

## 2018-07-12 RX ADMIN — POTASSIUM CHLORIDE 20 MEQ: 1500 TABLET, EXTENDED RELEASE ORAL at 09:16

## 2018-07-12 RX ADMIN — LORAZEPAM 1 MG: 2 INJECTION INTRAMUSCULAR; INTRAVENOUS at 16:33

## 2018-07-12 RX ADMIN — SODIUM CHLORIDE 125 ML/HR: 0.45 INJECTION, SOLUTION INTRAVENOUS at 05:23

## 2018-07-12 RX ADMIN — ISODIUM CHLORIDE 3 ML: 0.03 SOLUTION RESPIRATORY (INHALATION) at 07:41

## 2018-07-12 RX ADMIN — Medication 100 MG: at 09:16

## 2018-07-12 RX ADMIN — LEVALBUTEROL HYDROCHLORIDE 1.25 MG: 1.25 SOLUTION, CONCENTRATE RESPIRATORY (INHALATION) at 13:15

## 2018-07-12 RX ADMIN — ATORVASTATIN CALCIUM 40 MG: 40 TABLET, FILM COATED ORAL at 15:41

## 2018-07-12 RX ADMIN — NYSTATIN: 100000 OINTMENT TOPICAL at 19:11

## 2018-07-12 RX ADMIN — HYDROCORTISONE SODIUM SUCCINATE 80 MG: 100 INJECTION, POWDER, FOR SOLUTION INTRAMUSCULAR; INTRAVENOUS at 05:54

## 2018-07-12 RX ADMIN — OXAZEPAM 15 MG: 15 CAPSULE, GELATIN COATED ORAL at 11:53

## 2018-07-12 RX ADMIN — OXAZEPAM 10 MG: 10 CAPSULE, GELATIN COATED ORAL at 05:54

## 2018-07-12 RX ADMIN — FOLIC ACID 1 MG: 1 TABLET ORAL at 09:16

## 2018-07-12 RX ADMIN — NICOTINE 7 MG: 7 PATCH, EXTENDED RELEASE TRANSDERMAL at 09:15

## 2018-07-12 RX ADMIN — OXYCODONE HYDROCHLORIDE 10 MG: 10 TABLET ORAL at 09:16

## 2018-07-12 RX ADMIN — LEVALBUTEROL HYDROCHLORIDE 1.25 MG: 1.25 SOLUTION, CONCENTRATE RESPIRATORY (INHALATION) at 19:24

## 2018-07-12 RX ADMIN — SODIUM CHLORIDE 125 ML/HR: 0.45 INJECTION, SOLUTION INTRAVENOUS at 13:38

## 2018-07-12 RX ADMIN — HYDROCORTISONE SODIUM SUCCINATE 80 MG: 100 INJECTION, POWDER, FOR SOLUTION INTRAMUSCULAR; INTRAVENOUS at 14:49

## 2018-07-12 RX ADMIN — HEPARIN SODIUM 5000 UNITS: 5000 INJECTION, SOLUTION INTRAVENOUS; SUBCUTANEOUS at 14:49

## 2018-07-12 RX ADMIN — ISODIUM CHLORIDE 3 ML: 0.03 SOLUTION RESPIRATORY (INHALATION) at 13:15

## 2018-07-12 NOTE — PROGRESS NOTES
Progress Note - Colorectal Surgery   [facility] 66 y o  male MRN: [id number]  Unit/Bed#: [facility] 642-15 Encounter: [id number]    Assessment:  66 y o  M w/ hypotension suspect 2/2 severe dehydration; presented w/ hyponatremia and DEBBY; also with alcohol withdrawal    - Continues with agitation requiring ativan infusion but being titrated down    Plan:  Follow ileostomy output  Continue IVF  Regular diet as tolerating  Hydrocortisone 80q8    Subjective/Objective   Subjective:   Agitation continues  Objective:     Blood pressure 112/72, pulse 76, temperature 98 8 °F (37 1 °C), temperature source Oral, resp  rate 14, height 5' 6" (1 676 m), weight 63 kg (138 lb 14 2 oz), SpO2 99 %  ,Body mass index is 22 42 kg/m²  Intake/Output Summary (Last 24 hours) at 07/12/18 0555  Last data filed at 07/12/18 0470   Gross per 24 hour   Intake           3333 2 ml   Output             2090 ml   Net           1243 2 ml       Invasive Devices     Peripheral Intravenous Line            Peripheral IV 07/09/18 Left Antecubital 2 days    Peripheral IV 07/11/18 Left Forearm less than 1 day          Drain            Colostomy  RUQ 50 days    Closed/Suction Drain Left Abdomen Bulb 8 5 Fr  14 days                Physical Exam:   Gen: NAD, AAOx3  CV: RRR  Pulm: no resp distress  Abd: Soft, non-distended, non-tender  Ileostomy with brown stool output   SANTA w/ scant drainage      Lab, Imaging and other studies:  CBC:   No results found for: WBC, HGB, HCT, MCV, PLT, ADJUSTEDWBC, MCH, MCHC, RDW, MPV, NRBC, CMP:   Lab Results   Component Value Date     07/12/2018    K 3 8 07/12/2018     (H) 07/12/2018    CO2 23 07/12/2018    ANIONGAP 7 07/12/2018    BUN 5 07/12/2018    CREATININE 0 74 07/12/2018    GLUCOSE 135 07/12/2018    CALCIUM 8 0 (L) 07/12/2018    AST 5 07/12/2018    ALT 7 (L) 07/12/2018    ALKPHOS 84 07/12/2018    PROT 5 6 (L) 07/12/2018    BILITOT 0 17 (L) 07/12/2018    EGFR 103 07/12/2018   , Coagulation: No results found for: PT, INR, APTT  VTE Pharmacologic Prophylaxis: Heparin  VTE Mechanical Prophylaxis: sequential compression device

## 2018-07-12 NOTE — PROGRESS NOTES
Progress Note - Critical Care   Amanda Bravo 66 y o  male MRN: [id number]  Unit/Bed#: [facility] 784-82 Encounter: [id number]    Attending Physician: Mary Batista MD      ______________________________________________________________________  Assessment and Plan:   1  Acute toxic metabolic encephalopathy, suspected 2/2 acute alcohol withdrawal  2  Multiple intraabdominal abscesses, s/p IR drain 6/26/18  3  ETOH withdrawal  4  Severe dehydration, as evidenced by hypotension, hyponatremia, DEBBY  Improved  5  High ileostomy output, improved  6  Underlying COPD of unknown severity, no acute exacerbation  7  H/o PAF, currently in sinus  8  H/o prior RIGHT hemicolectomy 2005 w/ subsequent transverse colon perforation s/p EDITH, ileocolonic anastamosis, diverting ileostomy 0/4681 complicated by intra-abodminal abcess s/p IR drain placement  9  Chronic anemia    Neuro:   Acute alcohol withdrawal: CIWA 6-->plan to wean ativan infusion to off  Continue serax scheduled and ativan prn  Neuro checks  Continue folic, add thiamine    CV:   Hypotension: resolved  Off pressors for >24 hours  On solucortef 80q8 for scheduled 9 doses (to be completed today)  T/c wean as opposed to abrupt discontinuation  Random cortisol 7/9 19 8  H/o PAF-review records to determine if amio needs to be restarted as patient was previously on amio  Reviewed tele-->NSR this hospitalization  No on AC  DVT proph: heparin SQ      Pulm:   COPD w/o AE: continue spiriva/xopenex w/ prn nebs    GI:   Monitor ostomy/SANTA output  Continue IVF  Colorectal following  Surveillance off ABX    :   DEBBY: resolved    F/E/N:   Hypomagnesemia: replete  Continue oral diet  ID: Surveillance of ABX  Monitor WBC/temps/cultures  No growth on cultures thus far    Heme:   Chronic anemia: no indication for blood transfusion  Consider anemia work up (TIBC/folic/iron/ferritin)    Endo: BS stable     Msk/Skin: frequent repositioning  Assess daily for skin breakdown  Disposition: Continue ICU care  Code Status: Level 1 - Full Code    Counseling / Coordination of Care  Total Critical Care time spent 0 minutes excluding procedures, teaching and family updates  ______________________________________________________________________    Chief Complaint: "get these ropes off [restraints] and I have to pee!"    24 Hour Events:   · Remains on ativan infusion  Current CIWA 6    · Did not require any additional benzo/analgesics  · HD stable/afebrile  · BC x2 72 hours  · Fluid culture from drain (-) bacteria  · Ileostomy output decreasing    Infusions:   0 45 NaCl 125/hr  Ativan infusion  0 7mg/hr    I/O: 3 3/1 9 +1 3  Colostomy: 775/24 hours  UO 75/hr  SANTA 40/24 hours        Review of Systems   Unable to perform ROS: Mental status change     ______________________________________________________________________    Physical Exam:   Physical Exam   Constitutional: Vital signs are normal  He is easily aroused  Non-toxic appearance  HENT:   Head: Normocephalic and atraumatic  Eyes: Conjunctivae and EOM are normal  Pupils are equal, round, and reactive to light  Neck: No JVD present  Cardiovascular: Regular rhythm and normal pulses  Exam reveals no decreased pulses  Pulmonary/Chest: Effort normal and breath sounds normal    Abdominal: Soft  Normal appearance and bowel sounds are normal  There is no tenderness  Musculoskeletal: Normal range of motion  Neurological: He is alert and easily aroused  GCS eye subscore is 4  GCS verbal subscore is 5  GCS motor subscore is 6  Skin: Skin is warm, dry and intact  Capillary refill takes less than 2 seconds  No rash noted  Psychiatric: His speech is normal  His mood appears anxious  Nursing note and vitals reviewed        ______________________________________________________________________  Vitals:    07/12/18 0500 07/12/18 0555 07/12/18 0600 07/12/18 0700   BP: 112/72  127/78 138/79   Pulse: 76  74 74   Resp: 14  13 14 Temp:       TempSrc:       SpO2: 99%  98% 99%   Weight:  65 1 kg (143 lb 8 3 oz)     Height:           Temperature:   Temp (24hrs), Av 2 °F (36 8 °C), Min:97 5 °F (36 4 °C), Max:98 8 °F (37 1 °C)    Current Temperature: 98 8 °F (37 1 °C)  Weights:   IBW: 63 8 kg    Body mass index is 23 16 kg/m²  Weight (last 2 days)     Date/Time   Weight    18 0555  65 1 (143 52)    18 0553  63 (138 89)            Hemodynamic Monitoring:  N/A     Non-Invasive/Invasive Ventilation Settings:  Respiratory    Lab Data (Last 4 hours)    None         O2/Vent Data (Last 4 hours)    None              No results found for: PHART, KGU2NEC, PO2ART, MIF8CMD, B1XJBDEW, BEART, SOURCE  SpO2: SpO2: 99 %  Intake and Outputs:  I/O       07/10 0701 -  07 -  07 -  0700    P  O  120 100     I V  (mL/kg) 3260 7 (51 8) 3092 3 (47 5)     NG/GT 29 30     IV Piggyback 400 100     Total Intake(mL/kg) 3809 7 (60 5) 3322 3 (51)     Urine (mL/kg/hr) 1500 (1) 1125 (0 7)     Drains 9 40     Stool 1025 775     Total Output 2534 1940      Net +1275 7 +1382 3             Unmeasured Urine Occurrence 3 x 1 x         UOP: 75 ml/hr   Nutrition:        Diet Orders            Start     Ordered    18 0709  Diet Regular; Regular House  Diet effective now     Question Answer Comment   Diet Type Regular    Regular Regular House    RD to adjust diet per protocol?  Yes        18 0708          Labs:     Results from last 7 days  Lab Units 18  0437 07/10/18  1410 07/10/18  0521 18  0605   WBC Thousand/uL 8 70  --  13 78* 13 31*   HEMOGLOBIN g/dL 7 9* 7 8* 7 7* 8 8*   HEMATOCRIT % 25 3* 24 0* 24 8* 27 4*   PLATELETS Thousands/uL 380  --  369 424*   NEUTROS PCT % 85*  --  94* 80*   MONOS PCT % 4  --  1* 9       Results from last 7 days  Lab Units 18  0439 18  0437 07/10/18  0522  18  0422  18  0233   SODIUM mmol/L 139 139 136  < >  --   < > 115*   POTASSIUM mmol/L 3 8 4 1 4 6 < >  --   < > 5 9*   CHLORIDE mmol/L 109* 108 106  < >  --   < > 81*   CO2 mmol/L 23 24 23  < >  --   < > 22   BUN mg/dL 5 5 11  < >  --   < > 60*   CREATININE mg/dL 0 74 0 77 0 76  < >  --   < > 5 46*   CALCIUM mg/dL 8 0* 8 4 8 3  < >  --   < > 8 7   TOTAL PROTEIN g/dL 5 6*  --   --   --  6 2*  --  8 8*   BILIRUBIN TOTAL mg/dL 0 17*  --   --   --  0 46  --  0 47   ALK PHOS U/L 84  --   --   --  103  --  132*   ALT U/L 7*  --   --   --  9*  --  12   AST U/L 5  --   --   --  7  --  21   GLUCOSE RANDOM mg/dL 135 104 129  < >  --   < > 97   GLUCOSE, ISTAT   --   --   --   --   --   < >  --    < > = values in this interval not displayed      Results from last 7 days  Lab Units 07/12/18  0439 07/11/18  0437 07/09/18  0422   MAGNESIUM mg/dL 1 7 1 5* 2 3     Lab Results   Component Value Date    PHOS 2 6 (L) 07/12/2018    PHOS 2 9 07/11/2018    PHOS 3 1 07/09/2018        Results from last 7 days  Lab Units 07/08/18  0233   INR  0 96   PTT seconds 27       0  Lab Value Date/Time   TROPONINI <0 02 06/13/2018 1512   TROPONINI 0 12 (H) 05/20/2018 1753   TROPONINI 0 17 (H) 05/20/2018 1505   TROPONINI 0 17 (H) 05/20/2018 1214   TROPONINI 0 29 (H) 05/19/2018 0438   TROPONINI 0 30 (H) 05/19/2018 0154   TROPONINI 0 32 (H) 05/18/2018 2229   TROPONINI 0 32 (H) 05/18/2018 1909   TROPONINI 1 15 (H) 05/16/2018 1416   TROPONINI 1 24 (H) 05/16/2018 0522   TROPONINI 1 16 (H) 05/16/2018 0209   TROPONINI 1 09 (H) 05/16/2018 0053   TROPONINI 0 85 (H) 05/15/2018 1935   TROPONINI 0 72 (H) 05/15/2018 1728   TROPONINI 0 47 (H) 05/15/2018 1304   TROPONINI 0 02 12/07/2016 1931   TROPONINI 0 04 (H) 12/07/2016 0907   TROPONINI <0 04 05/22/2015 0123       Results from last 7 days  Lab Units 07/08/18  0233   LACTIC ACID mmol/L 1 2     ABG:  Lab Results   Component Value Date    PHART 7 443 05/26/2018    WXD9VXF 46 1 (H) 05/26/2018    PO2ART 72 4 (L) 05/26/2018    MKT9SBM 30 8 (H) 05/26/2018    BEART 5 9 05/26/2018    SOURCE Radial, Left 05/26/2018 Imaging: no new imaging I have personally reviewed pertinent reports  EKG: NSR  No afib/24 hours  Micro:  Lab Results   Component Value Date    BLOODCX No Growth at 72 hrs  07/08/2018    BLOODCX No Growth at 72 hrs  07/08/2018    BLOODCX No Growth After 5 Days  06/13/2018    BLOODCX No Growth After 5 Days  06/13/2018    URINECX Culture too young- will reincubate 07/08/2018    URINECX >100,000 cfu/ml Candida sp  presumptively albicans (A) 06/13/2018    URINECX 6184-6970 cfu/ml  06/13/2018     Allergies:    Allergies   Allergen Reactions    Other      Brown cloth band aids       Latex Rash     Medications:   Scheduled Meds:  Current Facility-Administered Medications:  albuterol 2 5 mg Nebulization Q6H PRN Sergei Mcneal MD    fentanyl citrate (PF) 50 mcg Intravenous Q4H PRN Anastasia Catalan PA-C    folic acid 1 mg Oral Daily Shena Barraza PA-C    heparin (porcine) 5,000 Units Subcutaneous [facility] [place] Gamino MD    hydrocortisone sodium succinate 80 mg Intravenous [facility] Anastasia Catalan PA-C    levalbuterol 1 25 mg Nebulization TID Sergei Mcneal MD    LORazepam 1 mg Intravenous Q4H PRN AMALIA Miller    lorazepam 0 7 mg/hr Intravenous Continuous Khloe Cui MD Last Rate: 0 7 mg/hr (07/11/18 1947)   magnesium sulfate 2 g Intravenous Once Shena Barraza PA-C    nicotine 7 mg Transdermal Daily [place] Gamino MD    nystatin  Topical BID Skyla Mcgraw PA-C    oxazepam 10 mg Oral Q6H [facility] Skyla Mcgraw PA-C    oxyCODONE 5 mg Oral Q4H PRN Skyla Mcgraw PA-C    Or        oxyCODONE 10 mg Oral Q4H PRN Skyla Mcgraw PA-C    sodium chloride 3 mL Nebulization TID Sergei Mcneal MD    sodium chloride 125 mL/hr Intravenous Continuous Skyla Mcgraw PA-C Last Rate: 125 mL/hr (07/12/18 0523)   thiamine 100 mg Oral Daily [facility] PA-C    tiotropium 18 mcg Inhalation Daily [place] Gamino MD      Continuous Infusions:  lorazepam 0 7 mg/hr Last Rate: 0 7 mg/hr (07/11/18 1947)   sodium chloride 125 mL/hr Last Rate: 125 mL/hr (07/12/18 0523)     PRN Meds:    albuterol 2 5 mg Q6H PRN   fentanyl citrate (PF) 50 mcg Q4H PRN   LORazepam 1 mg Q4H PRN   oxyCODONE 5 mg Q4H PRN   Or     oxyCODONE 10 mg Q4H PRN     VTE Pharmacologic Prophylaxis: Heparin  VTE Mechanical Prophylaxis: sequential compression device  Invasive lines and devices: Invasive Devices     Peripheral Intravenous Line            Peripheral IV 07/09/18 Left Antecubital 2 days    Peripheral IV 07/11/18 Left Forearm less than 1 day          Drain            Colostomy  RUQ 50 days    Closed/Suction Drain Left Abdomen Bulb 8 5 Fr  14 days                     Portions of the record may have been created with voice recognition software  Occasional wrong word or "sound a like" substitutions may have occurred due to the inherent limitations of voice recognition software  Read the chart carefully and recognize, using context, where substitutions have occurred      [place], [place]

## 2018-07-12 NOTE — PROGRESS NOTES
[facility] Unit Transfer Note  Unit/Bed # @DBLINK (BNL,44701)@ Encounter: [id number]  SOD Team A          Stuart Blandon 66 y o  male [id number]      Active Hospital Problems: Principal Problem:    Intra-abdominal abscess [facility])  Active Problems:    Coronary artery disease involving native coronary artery of native heart    COPD (chronic obstructive pulmonary disease) (HCC)    Hyponatremia    Alcohol abuse    Tobacco abuse    Heart failure, systolic, due to idiopathic cardiomyopathy ([facility] [address] )    Moderate protein-calorie malnutrition ([facility] [address] )    Anemia    H/o prior RIGHT hemicolectomy 2005 w/ subsequent transverse colon perforation s/p EDITH, ileocolonic anastamosis, diverting ileostomy 9/9004 complicated by intra-abodminal abcess s/p IR drain placement  - monitor ostomy, SANTA output  - colorectal surgery signed off  - pain management - tylenol, oxycodone prn    Hypotension 2/2 dehydration from ileostomy - resolved, was on levophed gtt in the ICU  - currently -140/80  - stable  - hold off antihypertensive home meds - lisinopril, metoprolol succinate    Alcohol withdrawal - serax 15 mg every 6 hours, ativan 1mg prn  - daily folate and thiamine  - monitor    Chronic Anemia - Hb 7 9  - ordered folate, B 12, iron panel    Hx of paroxysmal Afib - continue amiodarone 200mg po daily    CAD - s/p stent in RCA in 2012 - cont  aspirin, lipitor    COPD - cont  spiriva, xopenex and albuterol prn   - currently on 2L NC sating 99%, wean off NC as tolerated   - airway clearance protocol    DEBBY - Resolved with iv fluids hydration    Hyponatremia - Resolved    Tobacco abuse - nicotine patch    Protein calorie malnutrition - albumin 1 9  - ensure drinks        VTE Pharmacologic Prophylaxis: Heparin  VTE Mechanical Prophylaxis: sequential compression device    Disposition: Patient requires Med/Surg    Hospital Course: Stuart Blandon is a 66 y o  male was hospitalized for worsening abdominal pain and nausea   Patient has a PMH of Crohn's s/p R hemicolectomy complicated by transverse colon perforation that required repair, anastomosis and ileostomy  Patient was found to have intra-abdominal fluid collections due to anastomotic leak 1 month ago, and IR aspirated purulent fluid and placed drain into LUQ on 6/27  Patient was admitted to ICU for hypotension and acute renal failure  Patient clinically improved in the ICU, DEBBY and hyponatremia resolved  Patient was transferred to [facility]surg floor for monitoring of alcohol withdrawal symptoms and COPD  Subjective: Patient not very talkative, does not offer any complaints  Objective:   Vitals:    07/12/18 1010 07/12/18 1110 07/12/18 1327 07/12/18 1457   BP: 125/78 137/86     Pulse: 84 94     Resp: 14 18     Temp:    97 7 °F (36 5 °C)   TempSrc:    Oral   SpO2: 98% 98% 99%    Weight:       Height:         I/O last 24 hours: In: 4610 4 [P O :280; I V :4190 4; NG/GT:40; IV Piggyback:100]  Out: 7468 [Urine:1611; Drains:60; VKZSZ:0557]    Physical Exam   Constitutional: He appears well-developed and well-nourished  HENT:   Head: Normocephalic and atraumatic  Eyes: Conjunctivae are normal  Pupils are equal, round, and reactive to light  Neck: Neck supple  Cardiovascular: Normal rate and regular rhythm  No murmur heard  Pulmonary/Chest: Effort normal and breath sounds normal  No respiratory distress  He has no wheezes  Abdominal:   Refused abdominal exam   Musculoskeletal: He exhibits no edema  Neurological: He is alert  Skin: Skin is warm and dry  Psychiatric: He has a normal mood and affect           Anna Hicks MD

## 2018-07-13 LAB
ALBUMIN SERPL BCP-MCNC: 1.9 G/DL (ref 3.5–5)
ALP SERPL-CCNC: 80 U/L (ref 46–116)
ALT SERPL W P-5'-P-CCNC: 7 U/L (ref 12–78)
ANION GAP SERPL CALCULATED.3IONS-SCNC: 8 MMOL/L (ref 4–13)
AST SERPL W P-5'-P-CCNC: 7 U/L (ref 5–45)
BACTERIA BLD CULT: NORMAL
BACTERIA BLD CULT: NORMAL
BASOPHILS # BLD AUTO: 0.01 THOUSANDS/ΜL (ref 0–0.1)
BASOPHILS NFR BLD AUTO: 0 % (ref 0–1)
BILIRUB SERPL-MCNC: 0.15 MG/DL (ref 0.2–1)
BUN SERPL-MCNC: 8 MG/DL (ref 5–25)
CALCIUM SERPL-MCNC: 8 MG/DL (ref 8.3–10.1)
CHLORIDE SERPL-SCNC: 109 MMOL/L (ref 100–108)
CO2 SERPL-SCNC: 23 MMOL/L (ref 21–32)
CREAT SERPL-MCNC: 0.91 MG/DL (ref 0.6–1.3)
EOSINOPHIL # BLD AUTO: 0.01 THOUSAND/ΜL (ref 0–0.61)
EOSINOPHIL NFR BLD AUTO: 0 % (ref 0–6)
ERYTHROCYTE [DISTWIDTH] IN BLOOD BY AUTOMATED COUNT: 16 % (ref 11.6–15.1)
FERRITIN SERPL-MCNC: 242 NG/ML (ref 8–388)
FOLATE SERPL-MCNC: 7.7 NG/ML (ref 3.1–17.5)
GFR SERPL CREATININE-BSD FRML MDRD: 94 ML/MIN/1.73SQ M
GLUCOSE SERPL-MCNC: 80 MG/DL (ref 65–140)
HCT VFR BLD AUTO: 25.6 % (ref 36.5–49.3)
HGB BLD-MCNC: 7.5 G/DL (ref 12–17)
IMM GRANULOCYTES # BLD AUTO: 0.2 THOUSAND/UL (ref 0–0.2)
IMM GRANULOCYTES NFR BLD AUTO: 2 % (ref 0–2)
IRON SATN MFR SERPL: 44 %
IRON SERPL-MCNC: 101 UG/DL (ref 65–175)
LYMPHOCYTES # BLD AUTO: 2.38 THOUSANDS/ΜL (ref 0.6–4.47)
LYMPHOCYTES NFR BLD AUTO: 19 % (ref 14–44)
MAGNESIUM SERPL-MCNC: 1.9 MG/DL (ref 1.6–2.6)
MCH RBC QN AUTO: 28.6 PG (ref 26.8–34.3)
MCHC RBC AUTO-ENTMCNC: 29.3 G/DL (ref 31.4–37.4)
MCV RBC AUTO: 98 FL (ref 82–98)
MONOCYTES # BLD AUTO: 1.14 THOUSAND/ΜL (ref 0.17–1.22)
MONOCYTES NFR BLD AUTO: 9 % (ref 4–12)
NEUTROPHILS # BLD AUTO: 8.72 THOUSANDS/ΜL (ref 1.85–7.62)
NEUTS SEG NFR BLD AUTO: 70 % (ref 43–75)
NRBC BLD AUTO-RTO: 0 /100 WBCS
PHOSPHATE SERPL-MCNC: 2.1 MG/DL (ref 2.7–4.5)
PLATELET # BLD AUTO: 373 THOUSANDS/UL (ref 149–390)
PMV BLD AUTO: 9.8 FL (ref 8.9–12.7)
POTASSIUM SERPL-SCNC: 3.6 MMOL/L (ref 3.5–5.3)
PROT SERPL-MCNC: 5.6 G/DL (ref 6.4–8.2)
RBC # BLD AUTO: 2.62 MILLION/UL (ref 3.88–5.62)
SODIUM SERPL-SCNC: 140 MMOL/L (ref 136–145)
TIBC SERPL-MCNC: 228 UG/DL (ref 250–450)
VIT B12 SERPL-MCNC: 271 PG/ML (ref 100–900)
WBC # BLD AUTO: 12.46 THOUSAND/UL (ref 4.31–10.16)

## 2018-07-13 PROCEDURE — 99232 SBSQ HOSP IP/OBS MODERATE 35: CPT | Performed by: INTERNAL MEDICINE

## 2018-07-13 PROCEDURE — 80053 COMPREHEN METABOLIC PANEL: CPT | Performed by: PHYSICIAN ASSISTANT

## 2018-07-13 PROCEDURE — 83550 IRON BINDING TEST: CPT | Performed by: INTERNAL MEDICINE

## 2018-07-13 PROCEDURE — 83540 ASSAY OF IRON: CPT | Performed by: INTERNAL MEDICINE

## 2018-07-13 PROCEDURE — 82746 ASSAY OF FOLIC ACID SERUM: CPT | Performed by: INTERNAL MEDICINE

## 2018-07-13 PROCEDURE — 94668 MNPJ CHEST WALL SBSQ: CPT

## 2018-07-13 PROCEDURE — 84100 ASSAY OF PHOSPHORUS: CPT | Performed by: PHYSICIAN ASSISTANT

## 2018-07-13 PROCEDURE — 82728 ASSAY OF FERRITIN: CPT | Performed by: INTERNAL MEDICINE

## 2018-07-13 PROCEDURE — 94640 AIRWAY INHALATION TREATMENT: CPT

## 2018-07-13 PROCEDURE — 85025 COMPLETE CBC W/AUTO DIFF WBC: CPT | Performed by: PHYSICIAN ASSISTANT

## 2018-07-13 PROCEDURE — 83735 ASSAY OF MAGNESIUM: CPT | Performed by: PHYSICIAN ASSISTANT

## 2018-07-13 PROCEDURE — 94664 DEMO&/EVAL PT USE INHALER: CPT

## 2018-07-13 PROCEDURE — 82607 VITAMIN B-12: CPT | Performed by: INTERNAL MEDICINE

## 2018-07-13 PROCEDURE — 94760 N-INVAS EAR/PLS OXIMETRY 1: CPT

## 2018-07-13 RX ORDER — ONDANSETRON 2 MG/ML
4 INJECTION INTRAMUSCULAR; INTRAVENOUS EVERY 6 HOURS PRN
Status: DISCONTINUED | OUTPATIENT
Start: 2018-07-13 | End: 2018-07-17 | Stop reason: HOSPADM

## 2018-07-13 RX ORDER — OXAZEPAM 15 MG/1
15 CAPSULE ORAL EVERY 8 HOURS SCHEDULED
Status: DISCONTINUED | OUTPATIENT
Start: 2018-07-13 | End: 2018-07-14

## 2018-07-13 RX ADMIN — AMIODARONE HYDROCHLORIDE 200 MG: 200 TABLET ORAL at 08:04

## 2018-07-13 RX ADMIN — NICOTINE 7 MG: 7 PATCH, EXTENDED RELEASE TRANSDERMAL at 08:04

## 2018-07-13 RX ADMIN — OXYCODONE HYDROCHLORIDE 5 MG: 5 TABLET ORAL at 21:43

## 2018-07-13 RX ADMIN — LEVALBUTEROL HYDROCHLORIDE 1.25 MG: 1.25 SOLUTION, CONCENTRATE RESPIRATORY (INHALATION) at 07:18

## 2018-07-13 RX ADMIN — LEVALBUTEROL HYDROCHLORIDE 1.25 MG: 1.25 SOLUTION, CONCENTRATE RESPIRATORY (INHALATION) at 13:45

## 2018-07-13 RX ADMIN — NYSTATIN: 100000 OINTMENT TOPICAL at 17:17

## 2018-07-13 RX ADMIN — OXAZEPAM 15 MG: 15 CAPSULE, GELATIN COATED ORAL at 05:35

## 2018-07-13 RX ADMIN — OXYCODONE HYDROCHLORIDE 5 MG: 5 TABLET ORAL at 01:21

## 2018-07-13 RX ADMIN — ISODIUM CHLORIDE 3 ML: 0.03 SOLUTION RESPIRATORY (INHALATION) at 07:18

## 2018-07-13 RX ADMIN — HYDROCORTISONE SODIUM SUCCINATE 50 MG: 100 INJECTION, POWDER, FOR SOLUTION INTRAMUSCULAR; INTRAVENOUS at 21:42

## 2018-07-13 RX ADMIN — POTASSIUM PHOSPHATE, MONOBASIC AND POTASSIUM PHOSPHATE, DIBASIC 21 MMOL: 224; 236 INJECTION, SOLUTION INTRAVENOUS at 16:46

## 2018-07-13 RX ADMIN — ATORVASTATIN CALCIUM 40 MG: 40 TABLET, FILM COATED ORAL at 16:46

## 2018-07-13 RX ADMIN — HYDROCORTISONE SODIUM SUCCINATE 50 MG: 100 INJECTION, POWDER, FOR SOLUTION INTRAMUSCULAR; INTRAVENOUS at 13:28

## 2018-07-13 RX ADMIN — ISODIUM CHLORIDE 3 ML: 0.03 SOLUTION RESPIRATORY (INHALATION) at 19:33

## 2018-07-13 RX ADMIN — HEPARIN SODIUM 5000 UNITS: 5000 INJECTION, SOLUTION INTRAVENOUS; SUBCUTANEOUS at 13:27

## 2018-07-13 RX ADMIN — OXYCODONE HYDROCHLORIDE 5 MG: 5 TABLET ORAL at 05:34

## 2018-07-13 RX ADMIN — OXYCODONE HYDROCHLORIDE 5 MG: 5 TABLET ORAL at 11:45

## 2018-07-13 RX ADMIN — HEPARIN SODIUM 5000 UNITS: 5000 INJECTION, SOLUTION INTRAVENOUS; SUBCUTANEOUS at 05:35

## 2018-07-13 RX ADMIN — OXYCODONE HYDROCHLORIDE 5 MG: 5 TABLET ORAL at 17:17

## 2018-07-13 RX ADMIN — FOLIC ACID 1 MG: 1 TABLET ORAL at 08:03

## 2018-07-13 RX ADMIN — LEVALBUTEROL HYDROCHLORIDE 1.25 MG: 1.25 SOLUTION, CONCENTRATE RESPIRATORY (INHALATION) at 19:33

## 2018-07-13 RX ADMIN — NYSTATIN: 100000 OINTMENT TOPICAL at 08:04

## 2018-07-13 RX ADMIN — ISODIUM CHLORIDE 3 ML: 0.03 SOLUTION RESPIRATORY (INHALATION) at 13:45

## 2018-07-13 RX ADMIN — OXAZEPAM 15 MG: 15 CAPSULE, GELATIN COATED ORAL at 21:43

## 2018-07-13 RX ADMIN — OXAZEPAM 15 MG: 15 CAPSULE, GELATIN COATED ORAL at 13:27

## 2018-07-13 RX ADMIN — ONDANSETRON 4 MG: 2 INJECTION, SOLUTION INTRAMUSCULAR; INTRAVENOUS at 14:06

## 2018-07-13 RX ADMIN — Medication 100 MG: at 08:04

## 2018-07-13 RX ADMIN — TIOTROPIUM BROMIDE 18 MCG: 18 CAPSULE ORAL; RESPIRATORY (INHALATION) at 08:05

## 2018-07-13 RX ADMIN — HEPARIN SODIUM 5000 UNITS: 5000 INJECTION, SOLUTION INTRAVENOUS; SUBCUTANEOUS at 21:42

## 2018-07-13 NOTE — PROGRESS NOTES
IM Residency Progress Note   Unit/Bed#: [facility] 505-01 Encounter: [id number]  SOD Team A      Cesar Ill 66 y o  male [id number]    Hospital Stay Days: 5      Assessment/Plan:    Principal Problem:    Intra-abdominal abscess [facility])  Active Problems:    Coronary artery disease involving native coronary artery of native heart    COPD (chronic obstructive pulmonary disease) (HCC)    Hyponatremia    Alcohol abuse    Tobacco abuse    Alcohol dependence with withdrawal ([facility] [address] )    Heart failure, systolic, due to idiopathic cardiomyopathy [facility] )    Moderate protein-calorie malnutrition ([facility] )    Anemia    Hypotension    Crohns disease with prior RIGHT hemicolectomy 2005 w/ subsequent transverse colon perforation s/p EDITH, ileocolonic anastamosis, diverting ileostomy 4/2861 complicated by intra-abodminal abcess s/p IR drain placement  - monitor ostomy, SANTA output  - surgery is following  - pain management - tylenol, oxycodone prn    Acute encephalopathy - likely 2/2 EtOH withdrawal  Patient is currently denying history of alcohol use at all  Also may be multifactorial with stay in ICU and recent prolonged hospitalization  Patient currently oriented to person and place   - plan as below for EtOH withdrawal     Hypotension 2/2 dehydration from ileostomy - resolved, was on levophed gtt in the ICU now on Solucortef 80 q8 with plans to wean down  - decrease Solu-cortef to 50mg IV q8 and will continue to wean  - currently -140/80  - stable  - If BP remains in 140s off steroids can consider restarting on home lisinopril and metoprolol succinate     Alcohol withdrawal - serax 15 mg every 6 hours, ativan 1mg prn  He received one dose of PRN ativan overnight    Other than confusion, patient is asymptomatic  - will decreased serax to 15mg every 8 hours  - daily folate and thiamine  - monitor     Chronic Anemia - Hb 7 5, likely AOCD in setting of crohns  - monitor CBC daily, transfuse for Hgb < 7     Hx of paroxysmal Afib - stabel  - continue amiodarone 200mg po daily     CAD - s/p stent in RCA in 2012   - cont  aspirin, lipitor     COPD - currently on RA  - cont  spiriva, xopenex and albuterol prn   - airway clearance protocol     DEBBY - Resolved with iv fluids hydration     Hyponatremia - Resolved     Tobacco abuse - nicotine patch     Protein calorie malnutrition - albumin 1 9  - ensure drinks    Groin rash  - Add nystatin powder    Disposition: Continue inpatient care      Subjective:   Patient reports some abdominal soreness and R neck soreness where central line was removed  He has no other complaints at the time  He notes he had one episode of vomiting overnight  He denies any history of drinking alcohol  Per nursing staff he has been calm but slightly edgy and asking to smoke a cigarette  They note his behavior is not totally normal and he does not appear to be fully oriented to the situation at all times  No acute events overnight  Vitals: Temp (24hrs), Av 7 °F (36 5 °C), Min:97 4 °F (36 3 °C), Max:98 1 °F (36 7 °C)  Current: Temperature: 98 1 °F (36 7 °C)  Vitals:    18 0321 18 0546 18 0719 18 0740   BP:    141/84   BP Location:       Pulse: 79   (!) 107   Resp:    18   Temp:    98 1 °F (36 7 °C)   TempSrc:    Oral   SpO2:   96% 92%   Weight:  64 7 kg (142 lb 10 2 oz)     Height:        Body mass index is 23 02 kg/m²  I/O last 24 hours: In: 2843 8 [P O :1730; I V :1103 8; NG/GT:10]  Out: 2741 [Urine:1236; Drains:30; PZPGQ:7937]      Physical Exam   Constitutional: He appears well-developed and well-nourished  No distress  HENT:   Head: Normocephalic and atraumatic  Eyes: No scleral icterus  Neck: No tracheal deviation present  Cardiovascular: Normal rate, regular rhythm and normal heart sounds  No murmur heard  Pulmonary/Chest: Effort normal and breath sounds normal  No stridor  He has no wheezes  Abdominal: Soft  He exhibits no distension     Ostomy in place with no leakage, midline bandage in place  R abdominal SANTA drain with serosanguinous fluid   Musculoskeletal: He exhibits no edema or deformity  Neurological: He is alert  oreitned to person and place only   Skin: Skin is warm and dry  Psychiatric: He has a normal mood and affect           Invasive Devices     Peripheral Intravenous Line            Peripheral IV 07/11/18 Left Forearm 1 day          Drain            Colostomy  RUQ 51 days    Closed/Suction Drain Left Abdomen Bulb 8 5 Fr  15 days                Labs:   Recent Results (from the past 24 hour(s))   CBC and differential    Collection Time: 07/13/18  5:29 AM   Result Value Ref Range    WBC 12 46 (H) 4 31 - 10 16 Thousand/uL    RBC 2 62 (L) 3 88 - 5 62 Million/uL    Hemoglobin 7 5 (L) 12 0 - 17 0 g/dL    Hematocrit 25 6 (L) 36 5 - 49 3 %    MCV 98 82 - 98 fL    MCH 28 6 26 8 - 34 3 pg    MCHC 29 3 (L) 31 4 - 37 4 g/dL    RDW 16 0 (H) 11 6 - 15 1 %    MPV 9 8 8 9 - 12 7 fL    Platelets 694 509 - 416 Thousands/uL    nRBC 0 /100 WBCs    Neutrophils Relative 70 43 - 75 %    Immat GRANS % 2 0 - 2 %    Lymphocytes Relative 19 14 - 44 %    Monocytes Relative 9 4 - 12 %    Eosinophils Relative 0 0 - 6 %    Basophils Relative 0 0 - 1 %    Neutrophils Absolute 8 72 (H) 1 85 - 7 62 Thousands/µL    Immature Grans Absolute 0 20 0 00 - 0 20 Thousand/uL    Lymphocytes Absolute 2 38 0 60 - 4 47 Thousands/µL    Monocytes Absolute 1 14 0 17 - 1 22 Thousand/µL    Eosinophils Absolute 0 01 0 00 - 0 61 Thousand/µL    Basophils Absolute 0 01 0 00 - 0 10 Thousands/µL   Comprehensive metabolic panel    Collection Time: 07/13/18  5:29 AM   Result Value Ref Range    Sodium 140 136 - 145 mmol/L    Potassium 3 6 3 5 - 5 3 mmol/L    Chloride 109 (H) 100 - 108 mmol/L    CO2 23 21 - 32 mmol/L    Anion Gap 8 4 - 13 mmol/L    BUN 8 5 - 25 mg/dL    Creatinine 0 91 0 60 - 1 30 mg/dL    Glucose 80 65 - 140 mg/dL    Calcium 8 0 (L) 8 3 - 10 1 mg/dL    AST 7 5 - 45 U/L    ALT 7 (L) 12 - 78 U/L Alkaline Phosphatase 80 46 - 116 U/L    Total Protein 5 6 (L) 6 4 - 8 2 g/dL    Albumin 1 9 (L) 3 5 - 5 0 g/dL    Total Bilirubin 0 15 (L) 0 20 - 1 00 mg/dL    eGFR 94 ml/min/1 73sq m   Magnesium    Collection Time: 07/13/18  5:29 AM   Result Value Ref Range    Magnesium 1 9 1 6 - 2 6 mg/dL   Phosphorus    Collection Time: 07/13/18  5:29 AM   Result Value Ref Range    Phosphorus 2 1 (L) 2 7 - 4 5 mg/dL   Folate    Collection Time: 07/13/18  5:29 AM   Result Value Ref Range    Folate 7 7 3 1 - 17 5 ng/mL   Vitamin B12    Collection Time: 07/13/18  5:29 AM   Result Value Ref Range    Vitamin B-12 271 100 - 900 pg/mL   Iron Saturation %    Collection Time: 07/13/18  5:29 AM   Result Value Ref Range    Iron Saturation 44 %    TIBC 228 (L) 250 - 450 ug/dL    Iron 101 65 - 175 ug/dL   Ferritin    Collection Time: 07/13/18  5:29 AM   Result Value Ref Range    Ferritin 242 8 - 388 ng/mL       Radiology Results: I have personally reviewed pertinent reports  Other Diagnostic Testing:   I have personally reviewed pertinent reports          Active Meds:   Current Facility-Administered Medications   Medication Dose Route Frequency    acetaminophen (TYLENOL) tablet 650 mg  650 mg Oral Q6H PRN    albuterol inhalation solution 2 5 mg  2 5 mg Nebulization Q6H PRN    amiodarone tablet 200 mg  200 mg Oral Daily With Breakfast    atorvastatin (LIPITOR) tablet 40 mg  40 mg Oral Daily With Dinner    folic acid (FOLVITE) tablet 1 mg  1 mg Oral Daily    heparin (porcine) subcutaneous injection 5,000 Units  5,000 Units Subcutaneous Q8H [address]    levalbuterol (XOPENEX) inhalation solution 1 25 mg  1 25 mg Nebulization TID    LORazepam (ATIVAN) 2 mg/mL injection 1 mg  1 mg Intravenous Q4H PRN    nicotine (NICODERM CQ) 7 mg/24hr TD 24 hr patch 7 mg  7 mg Transdermal Daily    nystatin (MYCOSTATIN) ointment   Topical BID    oxazepam (SERAX) capsule 15 mg  15 mg Oral Q6H JUVENTINO    oxyCODONE (ROXICODONE) IR tablet 5 mg  5 mg Oral Q4H PRN    sodium chloride 0 9 % inhalation solution 3 mL  3 mL Nebulization TID    thiamine (VITAMIN B1) tablet 100 mg  100 mg Oral Daily    tiotropium (SPIRIVA) capsule for inhaler 18 mcg  18 mcg Inhalation Daily         VTE Pharmacologic Prophylaxis: Reason for no pharmacologic prophylaxis due to recent surgery  VTE Mechanical Prophylaxis: sequential compression device    Clayton Spears DO

## 2018-07-13 NOTE — PROGRESS NOTES
Progress Note - Colorectal Surgery   Donnie Corrigan 66 y o  male MRN: [id number]  Unit/Bed#: [facility] 505-01 Encounter: [id number]    Assessment:  66 y o  M w/ hypotension suspect 2/2 severe dehydration; presented w/ hyponatremia and DEBBY; also with alcohol withdrawal    - Off ativan    Plan:  Continue to follow ileostomy output  Regular diet as tolerating  Wet to dry dressing to midline per nursing  Call with questions    Subjective/Objective   Subjective:       Objective:     Blood pressure 141/84, pulse (!) 107, temperature 98 1 °F (36 7 °C), temperature source Oral, resp  rate 18, height 5' 6" (1 676 m), weight 64 7 kg (142 lb 10 2 oz), SpO2 92 %  ,Body mass index is 23 02 kg/m²        Intake/Output Summary (Last 24 hours) at 07/13/18 4948  Last data filed at 07/13/18 0700   Gross per 24 hour   Intake          2346 84 ml   Output             2466 ml   Net          -119 16 ml       Invasive Devices     Peripheral Intravenous Line            Peripheral IV 07/11/18 Left Forearm 1 day          Drain            Colostomy  RUQ 51 days    Closed/Suction Drain Left Abdomen Bulb 8 5 Fr  15 days                Physical Exam:   Gen: NAD, AAOx3  CV: RRR  Pulm: no resp distress  Abd: Soft, non-distended, non-tender, ostomy with brown stool      Lab, Imaging and other studies:  CBC:   Lab Results   Component Value Date    WBC 12 46 (H) 07/13/2018    HGB 7 5 (L) 07/13/2018    HCT 25 6 (L) 07/13/2018    MCV 98 07/13/2018     07/13/2018    MCH 28 6 07/13/2018    MCHC 29 3 (L) 07/13/2018    RDW 16 0 (H) 07/13/2018    MPV 9 8 07/13/2018    NRBC 0 07/13/2018   , CMP:   Lab Results   Component Value Date     07/13/2018    K 3 6 07/13/2018     (H) 07/13/2018    CO2 23 07/13/2018    ANIONGAP 8 07/13/2018    BUN 8 07/13/2018    CREATININE 0 91 07/13/2018    GLUCOSE 80 07/13/2018    CALCIUM 8 0 (L) 07/13/2018    AST 7 07/13/2018    ALT 7 (L) 07/13/2018    ALKPHOS 80 07/13/2018    PROT 5 6 (L) 07/13/2018    BILITOT 0 15 (L) 07/13/2018    EGFR 94 07/13/2018   , Coagulation: No results found for: PT, INR, APTT  VTE Pharmacologic Prophylaxis: Heparin  VTE Mechanical Prophylaxis: sequential compression device

## 2018-07-14 PROBLEM — E87.1 HYPONATREMIA: Status: RESOLVED | Noted: 2018-05-15 | Resolved: 2018-07-14

## 2018-07-14 LAB
ANION GAP SERPL CALCULATED.3IONS-SCNC: 7 MMOL/L (ref 4–13)
BACTERIA UR CULT: ABNORMAL
BACTERIA UR CULT: ABNORMAL
BUN SERPL-MCNC: 12 MG/DL (ref 5–25)
CALCIUM SERPL-MCNC: 8.1 MG/DL (ref 8.3–10.1)
CHLORIDE SERPL-SCNC: 109 MMOL/L (ref 100–108)
CO2 SERPL-SCNC: 25 MMOL/L (ref 21–32)
CREAT SERPL-MCNC: 0.79 MG/DL (ref 0.6–1.3)
ERYTHROCYTE [DISTWIDTH] IN BLOOD BY AUTOMATED COUNT: 16.3 % (ref 11.6–15.1)
GFR SERPL CREATININE-BSD FRML MDRD: 100 ML/MIN/1.73SQ M
GLUCOSE SERPL-MCNC: 101 MG/DL (ref 65–140)
HCT VFR BLD AUTO: 24.2 % (ref 36.5–49.3)
HGB BLD-MCNC: 7.4 G/DL (ref 12–17)
MCH RBC QN AUTO: 28.8 PG (ref 26.8–34.3)
MCHC RBC AUTO-ENTMCNC: 30.6 G/DL (ref 31.4–37.4)
MCV RBC AUTO: 94 FL (ref 82–98)
PLATELET # BLD AUTO: 345 THOUSANDS/UL (ref 149–390)
PMV BLD AUTO: 9.6 FL (ref 8.9–12.7)
POTASSIUM SERPL-SCNC: 4.3 MMOL/L (ref 3.5–5.3)
RBC # BLD AUTO: 2.57 MILLION/UL (ref 3.88–5.62)
SODIUM SERPL-SCNC: 141 MMOL/L (ref 136–145)
WBC # BLD AUTO: 14.12 THOUSAND/UL (ref 4.31–10.16)

## 2018-07-14 PROCEDURE — 97163 PT EVAL HIGH COMPLEX 45 MIN: CPT | Performed by: PHYSICAL THERAPIST

## 2018-07-14 PROCEDURE — 94760 N-INVAS EAR/PLS OXIMETRY 1: CPT

## 2018-07-14 PROCEDURE — G8979 MOBILITY GOAL STATUS: HCPCS | Performed by: PHYSICAL THERAPIST

## 2018-07-14 PROCEDURE — 94668 MNPJ CHEST WALL SBSQ: CPT

## 2018-07-14 PROCEDURE — G8978 MOBILITY CURRENT STATUS: HCPCS | Performed by: PHYSICAL THERAPIST

## 2018-07-14 PROCEDURE — 85027 COMPLETE CBC AUTOMATED: CPT | Performed by: INTERNAL MEDICINE

## 2018-07-14 PROCEDURE — 94640 AIRWAY INHALATION TREATMENT: CPT

## 2018-07-14 PROCEDURE — 80048 BASIC METABOLIC PNL TOTAL CA: CPT | Performed by: INTERNAL MEDICINE

## 2018-07-14 PROCEDURE — 99232 SBSQ HOSP IP/OBS MODERATE 35: CPT | Performed by: INTERNAL MEDICINE

## 2018-07-14 RX ORDER — OXAZEPAM 15 MG/1
15 CAPSULE ORAL EVERY 12 HOURS
Status: DISCONTINUED | OUTPATIENT
Start: 2018-07-14 | End: 2018-07-15

## 2018-07-14 RX ADMIN — NYSTATIN: 100000 OINTMENT TOPICAL at 17:03

## 2018-07-14 RX ADMIN — ISODIUM CHLORIDE 3 ML: 0.03 SOLUTION RESPIRATORY (INHALATION) at 13:22

## 2018-07-14 RX ADMIN — HEPARIN SODIUM 5000 UNITS: 5000 INJECTION, SOLUTION INTRAVENOUS; SUBCUTANEOUS at 05:33

## 2018-07-14 RX ADMIN — ATORVASTATIN CALCIUM 40 MG: 40 TABLET, FILM COATED ORAL at 17:00

## 2018-07-14 RX ADMIN — OXYCODONE HYDROCHLORIDE 5 MG: 5 TABLET ORAL at 11:17

## 2018-07-14 RX ADMIN — LEVALBUTEROL HYDROCHLORIDE 1.25 MG: 1.25 SOLUTION, CONCENTRATE RESPIRATORY (INHALATION) at 13:22

## 2018-07-14 RX ADMIN — NYSTATIN: 100000 OINTMENT TOPICAL at 08:14

## 2018-07-14 RX ADMIN — ISODIUM CHLORIDE 3 ML: 0.03 SOLUTION RESPIRATORY (INHALATION) at 07:04

## 2018-07-14 RX ADMIN — TIOTROPIUM BROMIDE 18 MCG: 18 CAPSULE ORAL; RESPIRATORY (INHALATION) at 08:13

## 2018-07-14 RX ADMIN — LEVALBUTEROL HYDROCHLORIDE 1.25 MG: 1.25 SOLUTION, CONCENTRATE RESPIRATORY (INHALATION) at 07:04

## 2018-07-14 RX ADMIN — HEPARIN SODIUM 5000 UNITS: 5000 INJECTION, SOLUTION INTRAVENOUS; SUBCUTANEOUS at 14:04

## 2018-07-14 RX ADMIN — FOLIC ACID 1 MG: 1 TABLET ORAL at 08:12

## 2018-07-14 RX ADMIN — HYDROCORTISONE SODIUM SUCCINATE 50 MG: 100 INJECTION, POWDER, FOR SOLUTION INTRAMUSCULAR; INTRAVENOUS at 21:13

## 2018-07-14 RX ADMIN — ISODIUM CHLORIDE 3 ML: 0.03 SOLUTION RESPIRATORY (INHALATION) at 19:46

## 2018-07-14 RX ADMIN — OXAZEPAM 15 MG: 15 CAPSULE, GELATIN COATED ORAL at 05:33

## 2018-07-14 RX ADMIN — HEPARIN SODIUM 5000 UNITS: 5000 INJECTION, SOLUTION INTRAVENOUS; SUBCUTANEOUS at 21:13

## 2018-07-14 RX ADMIN — OXAZEPAM 15 MG: 15 CAPSULE, GELATIN COATED ORAL at 17:02

## 2018-07-14 RX ADMIN — HYDROCORTISONE SODIUM SUCCINATE 50 MG: 100 INJECTION, POWDER, FOR SOLUTION INTRAMUSCULAR; INTRAVENOUS at 05:33

## 2018-07-14 RX ADMIN — OXYCODONE HYDROCHLORIDE 5 MG: 5 TABLET ORAL at 21:13

## 2018-07-14 RX ADMIN — Medication 100 MG: at 08:12

## 2018-07-14 RX ADMIN — NICOTINE 7 MG: 7 PATCH, EXTENDED RELEASE TRANSDERMAL at 08:12

## 2018-07-14 RX ADMIN — OXYCODONE HYDROCHLORIDE 5 MG: 5 TABLET ORAL at 17:00

## 2018-07-14 RX ADMIN — LEVALBUTEROL HYDROCHLORIDE 1.25 MG: 1.25 SOLUTION, CONCENTRATE RESPIRATORY (INHALATION) at 19:46

## 2018-07-14 RX ADMIN — OXYCODONE HYDROCHLORIDE 5 MG: 5 TABLET ORAL at 05:28

## 2018-07-14 RX ADMIN — AMIODARONE HYDROCHLORIDE 200 MG: 200 TABLET ORAL at 08:12

## 2018-07-14 RX ADMIN — ONDANSETRON 4 MG: 2 INJECTION, SOLUTION INTRAMUSCULAR; INTRAVENOUS at 12:18

## 2018-07-14 NOTE — PLAN OF CARE
Problem: PHYSICAL THERAPY ADULT  Goal: Performs mobility at highest level of function for planned discharge setting  See evaluation for individualized goals  Treatment/Interventions: Functional transfer training, LE strengthening/ROM, Elevations, Therapeutic exercise, Endurance training, Patient/family training, Equipment eval/education, Gait training, Bed mobility, Spoke to nursing, Spoke to case management, Continued evaluation  Equipment Recommended: Ibis Breath       See flowsheet documentation for full assessment, interventions and recommendations  Prognosis: Fair  Problem List: Decreased strength, Decreased range of motion, Decreased endurance, Impaired balance, Decreased skin integrity, Pain  Assessment: Pt is a 66 y o  male admitted to [facility] on 7/8/2018 w/ Intra-abdominal abscess ([address] ); Patient has a complex medical history which includes CAD, alcohol abuse, tobacco abuse, dyslipidemia and leukocytosis  Pt exhibits significant impairments with weakness, decreased ROM, impaired balance, decreased endurance, impaired coordination, gait deviations, pain, decreased activity tolerance, decreased functional mobility tolerance, fall risk and decreased skin integrity; these impact independence with mobility, ADLs, and IADLs; Patient received a 55 on the objective measure of the Barthel Index which also reveals limitations;  therapy prognosis is impacted by relevant co morbidities as noted in evaluation; clinical presentation is currently unstable/unpredictable - PT did not observe bed mobility because patient was OOB upon PT arrival   Patient was min A x 1 for transfers and ambulation  PT did not have patient negotiate stairs at this time; PTA, pt was Modified Independent with mobility, ADLs and IADLs    PT recommended skilled PT in order to optimize functional independence and discharge planning; pending functional progress, PT recommendation at discharge is Home PT  and home with family support Barriers to Discharge: Inaccessible home environment     Recommendation: Home with family support, Home PT     PT - OK to Discharge: No    See flowsheet documentation for full assessment

## 2018-07-14 NOTE — PROGRESS NOTES
IM Residency Progress Note   Unit/Bed#: [facility] 505-01 Encounter: [id number]  SOD Team A      Neva Sam 66 y o  male [id number]    Hospital Stay Days: 6      Assessment/Plan:    Principal Problem:    Intra-abdominal abscess [facility])  Active Problems:    Coronary artery disease involving native coronary artery of native heart    COPD (chronic obstructive pulmonary disease) ([facility])    Hyponatremia    Alcohol abuse    Tobacco abuse    Alcohol dependence with withdrawal ([facility] [address] )    Heart failure, systolic, due to idiopathic cardiomyopathy ([facility] )    Moderate protein-calorie malnutrition ([facility] )    Anemia    Hypotension    Crohns disease with prior RIGHT hemicolectomy 2005 w/ subsequent transverse colon perforation s/p EDITH, ileocolonic anastamosis, diverting ileostomy 1/4427 complicated by intra-abodminal abcess s/p IR drain placement  - monitor ostomy, SANTA output  - surgery is following:  Spoke to surgery regarding SANTA drain removal   SANTA drain may be removed with outputs below 30mL with serous fluid  If fluid is purulent SANTA drain will need to remain in place  Okay to remove SANTA drain from colorectal standpoint  Interventional Radiology placed a drain and they will need to do a drain check referral   - pain management - tylenol, oxycodone prn     Acute encephalopathy - likely 2/2 EtOH withdrawal  Patient is currently denying history of alcohol use at all  Also may be multifactorial with stay in ICU and recent prolonged hospitalization  Patient currently oriented to person and place   - plan as below for EtOH withdrawal     Hypotension 2/2 dehydration from ileostomy - resolved, was on levophed gtt in the ICU now on Solucortef 80 q8 with plans to wean down  - decrease Solu-cortef to 50mg IV q 12 and will continue to wean  - currently -140/80  - stable  - If BP remains in 140s off steroids can consider restarting on home lisinopril and metoprolol succinate     Alcohol withdrawal - serax 15 mg every 8 hours, ativan 1mg prn  He did not require PRN ativan overnight  no confusion and remains asymptomatic  - will decreased serax to 15mg every 12 hours  - daily folate and thiamine  - monitor     Chronic Anemia - Hb 7 4, likely AOCD in setting of crohns  - monitor CBC daily, transfuse for Hgb < 7     Hx of paroxysmal Afib - stabel  - continue amiodarone 200mg po daily     CAD - s/p stent in RCA in 2012   - cont  aspirin, lipitor     COPD - currently on RA and end expiratory wheezing noted physical exam  - cont  spiriva, xopenex and albuterol prn   - airway clearance protocol      Tobacco abuse - nicotine patch     Protein calorie malnutrition - albumin 1 9  - ensure drinks     Groin rash  - Add nystatin powder    Disposition:  Continue management on medical   We will need SANTA drain check by IR prior to removal       Subjective:   Patient seen examined this morning  Patient resting comfortably in no acute distress  No acute events overnight  Patient denies any tremors, hallucinations, agitation, or anxiety  He still reporting mild abdominal pain at the site of his ostomy and SANTA drain placement  Denies fever, chills, headaches, lightheadedness, dizziness, visual disturbances, sore throat, chest pain, palpitations, SOB, abdominal pain, nausea, vomiting, or trouble urinating/ defecating  Vitals: Temp (24hrs), Av 7 °F (37 1 °C), Min:98 1 °F (36 7 °C), Max:99 1 °F (37 3 °C)  Current: Temperature: 98 6 °F (37 °C)  Vitals:    18 0012 18 0259 18 0620 18 0704   BP: 128/69 118/71     BP Location: Right arm Right arm     Pulse: 87 71     Resp: 18 16     Temp: 99 1 °F (37 3 °C) 98 6 °F (37 °C)     TempSrc: Oral Oral     SpO2: 95% 95%  93%   Weight:   64 7 kg (142 lb 10 2 oz)    Height:        Body mass index is 23 02 kg/m²  I/O last 24 hours: In: 1662 [P O :1080; NG/GT:10]  Out: 2310 [Urine:1200; Drains:10; Stool:1100]      Physical Exam   Constitutional: He is oriented to person, place, and time   He appears well-developed and well-nourished  No distress  HENT:   Head: Normocephalic and atraumatic  Right Ear: External ear normal    Left Ear: External ear normal    Nose: Nose normal    Eyes: Conjunctivae and EOM are normal  Pupils are equal, round, and reactive to light  Right eye exhibits no discharge  Left eye exhibits no discharge  No scleral icterus  Neck: No tracheal deviation present  Cardiovascular: Normal rate, regular rhythm, normal heart sounds and intact distal pulses  Exam reveals no gallop and no friction rub  No murmur heard  Pulmonary/Chest: Effort normal  No stridor  No respiratory distress  He has wheezes  He has no rales  He exhibits no tenderness  End-expiratory wheezing noted bilateral lung fields   Abdominal: Soft  Bowel sounds are normal  He exhibits no distension and no mass  There is no tenderness  There is no rebound and no guarding  No hernia  Musculoskeletal: He exhibits no edema, tenderness or deformity  Neurological: He is alert and oriented to person, place, and time  No cranial nerve deficit or sensory deficit  No hand tremors noted   Skin: Skin is warm and dry  No rash noted  He is not diaphoretic  No erythema  No pallor  Psychiatric: He has a normal mood and affect  Vitals reviewed            Invasive Devices     Peripheral Intravenous Line            Peripheral IV 07/13/18 Left;Upper Forearm less than 1 day          Drain            Colostomy  RUQ 52 days    Closed/Suction Drain Left Abdomen Bulb 8 5 Fr  16 days                          Labs:   Recent Results (from the past 24 hour(s))   CBC    Collection Time: 07/14/18  4:58 AM   Result Value Ref Range    WBC 14 12 (H) 4 31 - 10 16 Thousand/uL    RBC 2 57 (L) 3 88 - 5 62 Million/uL    Hemoglobin 7 4 (L) 12 0 - 17 0 g/dL    Hematocrit 24 2 (L) 36 5 - 49 3 %    MCV 94 82 - 98 fL    MCH 28 8 26 8 - 34 3 pg    MCHC 30 6 (L) 31 4 - 37 4 g/dL    RDW 16 3 (H) 11 6 - 15 1 %    Platelets 190 906 - 135 Thousands/uL    MPV 9 6 8 9 - 12 7 fL   Basic metabolic panel    Collection Time: 07/14/18  4:58 AM   Result Value Ref Range    Sodium 141 136 - 145 mmol/L    Potassium 4 3 3 5 - 5 3 mmol/L    Chloride 109 (H) 100 - 108 mmol/L    CO2 25 21 - 32 mmol/L    Anion Gap 7 4 - 13 mmol/L    BUN 12 5 - 25 mg/dL    Creatinine 0 79 0 60 - 1 30 mg/dL    Glucose 101 65 - 140 mg/dL    Calcium 8 1 (L) 8 3 - 10 1 mg/dL    eGFR 100 ml/min/1 73sq m       Radiology Results: I have personally reviewed pertinent reports  Other Diagnostic Testing:   I have personally reviewed pertinent reports          Active Meds:   Current Facility-Administered Medications   Medication Dose Route Frequency    acetaminophen (TYLENOL) tablet 650 mg  650 mg Oral Q6H PRN    albuterol inhalation solution 2 5 mg  2 5 mg Nebulization Q6H PRN    amiodarone tablet 200 mg  200 mg Oral Daily With Breakfast    atorvastatin (LIPITOR) tablet 40 mg  40 mg Oral Daily With Dinner    folic acid (FOLVITE) tablet 1 mg  1 mg Oral Daily    heparin (porcine) subcutaneous injection 5,000 Units  5,000 Units Subcutaneous Q8H [facility]    hydrocortisone sodium succinate (PF) (Solu-CORTEF) injection 50 mg  50 mg Intravenous Q8H [facility]    levalbuterol (XOPENEX) inhalation solution 1 25 mg  1 25 mg Nebulization TID    LORazepam (ATIVAN) 2 mg/mL injection 1 mg  1 mg Intravenous Q4H PRN    nicotine (NICODERM CQ) 7 mg/24hr TD 24 hr patch 7 mg  7 mg Transdermal Daily    nystatin (MYCOSTATIN) ointment   Topical BID    ondansetron (ZOFRAN) injection 4 mg  4 mg Intravenous Q6H PRN    oxazepam (SERAX) capsule 15 mg  15 mg Oral Q12H    oxyCODONE (ROXICODONE) IR tablet 5 mg  5 mg Oral Q4H PRN    sodium chloride 0 9 % inhalation solution 3 mL  3 mL Nebulization TID    thiamine (VITAMIN B1) tablet 100 mg  100 mg Oral Daily    tiotropium (SPIRIVA) capsule for inhaler 18 mcg  18 mcg Inhalation Daily         VTE Pharmacologic Prophylaxis: Heparin  VTE Mechanical Prophylaxis: sequential compression device    [place] Part, DO

## 2018-07-14 NOTE — PHYSICAL THERAPY NOTE
PT EVALUATION   Patient identified by name, birth date and medical bracelet    07/14/18 6290   Note Type   Note type Eval only   Pain Assessment   Pain Assessment 0-10   Pain Score 8   Pain Type Surgical pain   Pain Location Abdomen   Pain Orientation Bilateral   Home Living   Type of Home House  (0 KAREN)   Home Layout Two level;Performs ADLs on one level; Able to live on main level with bedroom/bathroom; Access   [employer] None  (daughter gives him a sponge bath)   Bathroom Toilet Raised   Bathroom Equipment Commode   Bathroom Accessibility [address] Walker   Additional Comments Patient told PT he has 0 KAREN  He told PT that he has a 1/2 inch step from his kitchen family room into his kitchen  He told PT he has his commode set up nex to his recliner  He lives on the 1st floor of his 3 story house  He told PT his daughter gives him sponge baths  Prior Function   Level of [place] Independent with ADLs and functional mobility; Needs assistance with IADLs   Lives With Daughter   Receives Help From Family   ADL Assistance Needs assistance   IADLs Needs assistance   Falls in the last 6 months 0   Vocational Unemployed   Restrictions/Precautions   Weight Bearing Precautions Per Order No   Braces or Orthoses Other (Comment)  (none)   Other Precautions Pain; Fall Risk;Telemetry;Multiple lines; Chair Alarm   General   Additional Pertinent History Patient has a complex past medical history which includes perforated colon, dyslipidemia, CAD, alcohol abuse and tobacco abuse  Family/Caregiver Present No   Cognition   Overall Cognitive Status WFL   Arousal/Participation Alert   Orientation Level Oriented X4   Memory Within functional limits   Following Commands Follows one step commands without difficulty   Comments Patient was pleasant and cooperative  He did not have any issues during PT evaluation  He asked to be walked again today at the conclusion of today's session      RUE Assessment RUE Assessment WFL   LUE Assessment   LUE Assessment WFL   RLE Assessment   RLE Assessment WFL   LLE Assessment   LLE Assessment WFL   Coordination   Movements are Fluid and Coordinated 0   Coordination and Movement Description Movements are Slow and Labored   Sensation WFL   Light Touch   RLE Light Touch Grossly intact   LLE Light Touch Grossly intact   Bed Mobility   Additional Comments Patient is seated OOB and in recliner upon PT's arrival    Transfers   Sit to Stand 4  Minimal assistance   Additional items Assist x 1; Armrests; Increased time required;Verbal cues   Stand to Sit 4  Minimal assistance   Additional items Assist x 1; Armrests; Increased time required;Verbal cues   Ambulation/Elevation   Gait pattern Excessively slow; Short stride; Foward flexed;Decreased foot clearance   Gait Assistance 4  Minimal assist   Additional items Assist x 1;Verbal cues   Assistive Device Rolling walker   Distance 30 ft x 1    Stair Management Assistance Not tested   Balance   Static Sitting Good   Dynamic Sitting Fair   Static Standing Poor +   Dynamic Standing Poor +   Ambulatory Poor +   Endurance Deficit   Endurance Deficit Yes   Endurance Deficit Description 2* to medical condition, general deconditioning and fatigue   Activity Tolerance   Activity Tolerance Patient limited by fatigue;Patient limited by pain;Treatment limited secondary to medical complications (Comment)  (see Problem List)   Medical Staff Made Aware CM made aware   Nurse Made Aware RN made aware   Assessment   Prognosis Fair   Problem List Decreased strength;Decreased range of motion;Decreased endurance; Impaired balance;Decreased skin integrity;Pain   Assessment Pt is a 66 y o  male admitted to [facility] on 7/8/2018 w/ Intra-abdominal abscess ([facility] [address] ); Patient has a complex medical history which includes CAD, alcohol abuse, tobacco abuse, dyslipidemia and leukocytosis    Pt exhibits significant impairments with weakness, decreased ROM, impaired balance, decreased endurance, impaired coordination, gait deviations, pain, decreased activity tolerance, decreased functional mobility tolerance, fall risk and decreased skin integrity; these impact independence with mobility, ADLs, and IADLs; Patient received a 55 on the objective measure of the Barthel Index which also reveals limitations;  therapy prognosis is impacted by relevant co morbidities as noted in evaluation; clinical presentation is currently unstable/unpredictable - PT did not observe bed mobility because patient was OOB upon PT arrival   Patient was min A x 1 for transfers and ambulation  PT did not have patient negotiate stairs at this time; PTA, pt was Modified Independent with mobility, ADLs and IADLs  PT recommended skilled PT in order to optimize functional independence and discharge planning; pending functional progress, PT recommendation at discharge is Home PT  and home with family support    Barriers to Discharge Inaccessible home environment   Goals   Patient Goals "I want to go home "    [facility] Expiration Date 07/28/18   Short Term Goal #1 In 10-14 days, patient will be: 1  Modified Independent with Bed Mobility Rolling Right and Left     2  Modified Independent with Bed Mobility Supine-Sit     3  Modified Independent with Transfer Bed-Chair     4  Increase Dynamic Sitting Balance at least 1 Grade for improved stability with functional reach activities     5  Increase Dynamic Standing Balance at least 1 Grade for improved ease with Activities of Daily Living     6  Increase Lower Extremity Strength at least 1 Grade for improved ease mobility tasks     7  Modified Independent with ambulation 300 feet using a rolling walker to facilitate home and community mobility 8  Modified Independent with Ascending/Descending 1 step to facilitate home and community accessibility  Plan   Treatment/Interventions Functional transfer training;LE strengthening/ROM; Elevations; Therapeutic exercise; Endurance training;Patient/family training;Equipment eval/education;Gait training;Bed mobility;Spoke to nursing;Spoke to case management;Continued evaluation   PT Frequency Other (Comment)  (3-5x/week)   Recommendation   Recommendation Home with family support;Home PT   Equipment Recommended Walker   PT - OK to Discharge No   Additional Comments Paitent must negotiate 1 step and be medically cleared     Barthel Index   Feeding 10   Bathing 0   Grooming Score 0   Dressing Score 5   Bladder Score 10   Bowels Score 10   Toilet Use Score 10   Transfers (Bed/Chair) Score 10   Mobility (Level Surface) Score 0   Stairs Score 0   Barthel Index Score 55   Cholo Gordon, PT

## 2018-07-15 ENCOUNTER — APPOINTMENT (INPATIENT)
Dept: RADIOLOGY | Facility: HOSPITAL | Age: 58
DRG: 720 | End: 2018-07-15
Payer: COMMERCIAL

## 2018-07-15 LAB
ANION GAP SERPL CALCULATED.3IONS-SCNC: 6 MMOL/L (ref 4–13)
BUN SERPL-MCNC: 17 MG/DL (ref 5–25)
CALCIUM SERPL-MCNC: 8.1 MG/DL (ref 8.3–10.1)
CHLORIDE SERPL-SCNC: 111 MMOL/L (ref 100–108)
CO2 SERPL-SCNC: 25 MMOL/L (ref 21–32)
CREAT SERPL-MCNC: 0.89 MG/DL (ref 0.6–1.3)
ERYTHROCYTE [DISTWIDTH] IN BLOOD BY AUTOMATED COUNT: 16.4 % (ref 11.6–15.1)
GFR SERPL CREATININE-BSD FRML MDRD: 96 ML/MIN/1.73SQ M
GLUCOSE SERPL-MCNC: 103 MG/DL (ref 65–140)
HCT VFR BLD AUTO: 25.6 % (ref 36.5–49.3)
HGB BLD-MCNC: 7.7 G/DL (ref 12–17)
MCH RBC QN AUTO: 28.8 PG (ref 26.8–34.3)
MCHC RBC AUTO-ENTMCNC: 30.1 G/DL (ref 31.4–37.4)
MCV RBC AUTO: 96 FL (ref 82–98)
PLATELET # BLD AUTO: 317 THOUSANDS/UL (ref 149–390)
PMV BLD AUTO: 10 FL (ref 8.9–12.7)
POTASSIUM SERPL-SCNC: 4.4 MMOL/L (ref 3.5–5.3)
RBC # BLD AUTO: 2.67 MILLION/UL (ref 3.88–5.62)
SODIUM SERPL-SCNC: 142 MMOL/L (ref 136–145)
WBC # BLD AUTO: 18.45 THOUSAND/UL (ref 4.31–10.16)

## 2018-07-15 PROCEDURE — 74177 CT ABD & PELVIS W/CONTRAST: CPT

## 2018-07-15 PROCEDURE — 94640 AIRWAY INHALATION TREATMENT: CPT

## 2018-07-15 PROCEDURE — 80048 BASIC METABOLIC PNL TOTAL CA: CPT | Performed by: INTERNAL MEDICINE

## 2018-07-15 PROCEDURE — 85027 COMPLETE CBC AUTOMATED: CPT | Performed by: INTERNAL MEDICINE

## 2018-07-15 PROCEDURE — 94760 N-INVAS EAR/PLS OXIMETRY 1: CPT

## 2018-07-15 PROCEDURE — 87493 C DIFF AMPLIFIED PROBE: CPT | Performed by: INTERNAL MEDICINE

## 2018-07-15 PROCEDURE — 99232 SBSQ HOSP IP/OBS MODERATE 35: CPT | Performed by: INTERNAL MEDICINE

## 2018-07-15 PROCEDURE — 94668 MNPJ CHEST WALL SBSQ: CPT

## 2018-07-15 RX ORDER — OXAZEPAM 15 MG/1
15 CAPSULE ORAL DAILY
Status: DISCONTINUED | OUTPATIENT
Start: 2018-07-16 | End: 2018-07-15

## 2018-07-15 RX ADMIN — LEVALBUTEROL HYDROCHLORIDE 1.25 MG: 1.25 SOLUTION, CONCENTRATE RESPIRATORY (INHALATION) at 07:08

## 2018-07-15 RX ADMIN — HYDROCORTISONE SODIUM SUCCINATE 25 MG: 100 INJECTION, POWDER, FOR SOLUTION INTRAMUSCULAR; INTRAVENOUS at 21:07

## 2018-07-15 RX ADMIN — LEVALBUTEROL HYDROCHLORIDE 1.25 MG: 1.25 SOLUTION, CONCENTRATE RESPIRATORY (INHALATION) at 13:07

## 2018-07-15 RX ADMIN — ATORVASTATIN CALCIUM 40 MG: 40 TABLET, FILM COATED ORAL at 16:20

## 2018-07-15 RX ADMIN — HEPARIN SODIUM 5000 UNITS: 5000 INJECTION, SOLUTION INTRAVENOUS; SUBCUTANEOUS at 21:07

## 2018-07-15 RX ADMIN — HYDROCORTISONE SODIUM SUCCINATE 50 MG: 100 INJECTION, POWDER, FOR SOLUTION INTRAMUSCULAR; INTRAVENOUS at 09:27

## 2018-07-15 RX ADMIN — OXAZEPAM 15 MG: 15 CAPSULE, GELATIN COATED ORAL at 05:13

## 2018-07-15 RX ADMIN — TIOTROPIUM BROMIDE 18 MCG: 18 CAPSULE ORAL; RESPIRATORY (INHALATION) at 09:27

## 2018-07-15 RX ADMIN — OXYCODONE HYDROCHLORIDE 5 MG: 5 TABLET ORAL at 01:16

## 2018-07-15 RX ADMIN — AMIODARONE HYDROCHLORIDE 200 MG: 200 TABLET ORAL at 09:26

## 2018-07-15 RX ADMIN — HEPARIN SODIUM 5000 UNITS: 5000 INJECTION, SOLUTION INTRAVENOUS; SUBCUTANEOUS at 13:42

## 2018-07-15 RX ADMIN — OXYCODONE HYDROCHLORIDE 5 MG: 5 TABLET ORAL at 09:26

## 2018-07-15 RX ADMIN — NYSTATIN: 100000 OINTMENT TOPICAL at 17:17

## 2018-07-15 RX ADMIN — Medication 100 MG: at 09:26

## 2018-07-15 RX ADMIN — HEPARIN SODIUM 5000 UNITS: 5000 INJECTION, SOLUTION INTRAVENOUS; SUBCUTANEOUS at 05:13

## 2018-07-15 RX ADMIN — OXYCODONE HYDROCHLORIDE 5 MG: 5 TABLET ORAL at 13:42

## 2018-07-15 RX ADMIN — OXYCODONE HYDROCHLORIDE 5 MG: 5 TABLET ORAL at 20:35

## 2018-07-15 RX ADMIN — ISODIUM CHLORIDE 3 ML: 0.03 SOLUTION RESPIRATORY (INHALATION) at 19:33

## 2018-07-15 RX ADMIN — ISODIUM CHLORIDE 3 ML: 0.03 SOLUTION RESPIRATORY (INHALATION) at 13:07

## 2018-07-15 RX ADMIN — OXYCODONE HYDROCHLORIDE 5 MG: 5 TABLET ORAL at 05:17

## 2018-07-15 RX ADMIN — FOLIC ACID 1 MG: 1 TABLET ORAL at 09:26

## 2018-07-15 RX ADMIN — NYSTATIN: 100000 OINTMENT TOPICAL at 09:27

## 2018-07-15 RX ADMIN — IOHEXOL 100 ML: 350 INJECTION, SOLUTION INTRAVENOUS at 18:08

## 2018-07-15 RX ADMIN — NICOTINE 7 MG: 7 PATCH, EXTENDED RELEASE TRANSDERMAL at 09:26

## 2018-07-15 RX ADMIN — IOHEXOL 50 ML: 240 INJECTION, SOLUTION INTRATHECAL; INTRAVASCULAR; INTRAVENOUS; ORAL at 18:08

## 2018-07-15 RX ADMIN — LEVALBUTEROL HYDROCHLORIDE 1.25 MG: 1.25 SOLUTION, CONCENTRATE RESPIRATORY (INHALATION) at 19:33

## 2018-07-15 RX ADMIN — ISODIUM CHLORIDE 3 ML: 0.03 SOLUTION RESPIRATORY (INHALATION) at 07:08

## 2018-07-15 NOTE — PROGRESS NOTES
IM Residency Progress Note   Unit/Bed#: [facility] 505-01 Encounter: [id number]  SOD Team A      Colin Moy 66 y o  male [id number]    Hospital Stay Days: 7      Assessment/Plan:    Principal Problem:    Intra-abdominal abscess [facility])  Active Problems:    Coronary artery disease involving native coronary artery of native heart    COPD (chronic obstructive pulmonary disease) ([facility] [address] )    Alcohol abuse    Tobacco abuse    Alcohol dependence with withdrawal ([facility] )    Heart failure, systolic, due to idiopathic cardiomyopathy ([facility] )    Moderate protein-calorie malnutrition ([facility] )    Anemia    Hypotension    Crohn's disease  -Prior right hemicolectomy 2005 with subsequent transverse colon perforation status post EDITH, ileocolonic anastomosis, diverting ileostomy 78/5503 complicated by intra-abdominal abscess abscess status post IR drain placement  -Monitor ileostomy/SANTA drain output  -Surgery is following  -the WBC 18 K today up from 14 K yesterday  -Per surgery GP drain can be removed with output below 30 mL with serous fluid  -As per patient, surgery is okay with removing the SANTA drain today  -IR placed a drain will need to do drain check referral  -Pain management-Tylenol, oxycodone p r n      Acute encephalopathy, resolved  -Likely secondary to EtOH withdrawal  -Patient is currently denying use of alcohol at all  -May be multifactorial with stay in ICU  -Currently awake alert and oriented x3  -Plan as below for ETOH withdrawal    Hypotension secondary to dehydration from ileostomy, resolved  -Patient was on Levophed drip in the ICU now on 50 mg q 12 solucortef  -Last blood pressure 129/72, has been around 120/70  -Stable  -If BP climbs to 140s can consider restarting on home lisinopril and metoprolol    Alcohol withdrawal  -She wrecked 15 mg to 24  -Did not require p r n  meds overnight  -Daily folate and thiamine  -Monitor    Chronic anemia  -Hemoglobin 7 7 from 7 4  -Likely AOCD in setting of Crohn's  -Monitor CBC daily, transfuse for hemoglobin less than 7    History of paroxysmal Afib  -Stable  -Continue amiodarone 200 mg p o  daily  -Monitor    CAD, status post stent in RCA in 2012  -Continue aspirin, Lipitor    COPD  -Currently on room air  -Expiratory wheezing noted on physical exam  -Continue Spiriva, Xopenex, and albuterol p r n   -Airway clearance protocol  -Being seen by Respiratory    Tobacco abuse  -Patient counseled on tobacco cessation  -On nicotine patch    Protein calorie nutrition  -Last albumin 1 9  -Ensure drinks    Groin rash  -Nystatin ointment b i d  Disposition: Inpatient        Subjective:   Reports abdominal pain no worse than yesterday  Patient has been OOB with walker  Vitals: Temp (24hrs), Av 6 °F (37 °C), Min:98 3 °F (36 8 °C), Max:99 °F (37 2 °C)  Current: Temperature: 98 4 °F (36 9 °C)  Vitals:    18 2300 07/15/18 0300 07/15/18 0509 07/15/18 0709   BP: 120/68 126/67  129/72   BP Location:       Pulse: 87 77  67   Resp: 18 18  18   Temp: 99 °F (37 2 °C) 98 8 °F (37 1 °C)  98 4 °F (36 9 °C)   TempSrc: Oral   Oral   SpO2: 96% 97%  100%   Weight:   63 5 kg (140 lb 1 6 oz)    Height:        Body mass index is 22 61 kg/m²  I/O last 24 hours: In: 620 [P O :620]  Out: 9623 [Urine:1175; Drains:25; Stool:2225]      Physical Exam: General appearance: alert and oriented, in no acute distress  Head: Normocephalic, without obvious abnormality, atraumatic  Lungs: wheezes  Heart: regular rate and rhythm, S1, S2 normal, no murmur, click, rub or gallop  Abdomen: soft, non-distended, SANTA drain draining minimal serosanguinous fluid  Ileostomy pink, patent, and productive  Abdoment tender in all four quadrants    Extremities: extremities normal, warm and well-perfused; no cyanosis, clubbing, or edema  Pulses: 2+ and symmetric  Neurologic: Grossly normal     Invasive Devices     Peripheral Intravenous Line            Peripheral IV 18 Left;Upper Forearm 1 day          Drain            Colostomy RUQ 53 days    Closed/Suction Drain Left Abdomen Bulb 8 5 Fr  17 days                          Labs:   Recent Results (from the past 24 hour(s))   CBC and Platelet    Collection Time: 07/15/18  4:35 AM   Result Value Ref Range    WBC 18 45 (H) 4 31 - 10 16 Thousand/uL    RBC 2 67 (L) 3 88 - 5 62 Million/uL    Hemoglobin 7 7 (L) 12 0 - 17 0 g/dL    Hematocrit 25 6 (L) 36 5 - 49 3 %    MCV 96 82 - 98 fL    MCH 28 8 26 8 - 34 3 pg    MCHC 30 1 (L) 31 4 - 37 4 g/dL    RDW 16 4 (H) 11 6 - 15 1 %    Platelets 467 758 - 701 Thousands/uL    MPV 10 0 8 9 - 12 7 fL   Basic metabolic panel    Collection Time: 07/15/18  4:35 AM   Result Value Ref Range    Sodium 142 136 - 145 mmol/L    Potassium 4 4 3 5 - 5 3 mmol/L    Chloride 111 (H) 100 - 108 mmol/L    CO2 25 21 - 32 mmol/L    Anion Gap 6 4 - 13 mmol/L    BUN 17 5 - 25 mg/dL    Creatinine 0 89 0 60 - 1 30 mg/dL    Glucose 103 65 - 140 mg/dL    Calcium 8 1 (L) 8 3 - 10 1 mg/dL    eGFR 96 ml/min/1 73sq m       Radiology Results: I have personally reviewed pertinent reports  Other Diagnostic Testing:   I have personally reviewed pertinent reports          Active Meds:   Current Facility-Administered Medications   Medication Dose Route Frequency    acetaminophen (TYLENOL) tablet 650 mg  650 mg Oral Q6H PRN    albuterol inhalation solution 2 5 mg  2 5 mg Nebulization Q6H PRN    amiodarone tablet 200 mg  200 mg Oral Daily With Breakfast    atorvastatin (LIPITOR) tablet 40 mg  40 mg Oral Daily With Dinner    folic acid (FOLVITE) tablet 1 mg  1 mg Oral Daily    heparin (porcine) subcutaneous injection 5,000 Units  5,000 Units Subcutaneous Q8H [address]    hydrocortisone sodium succinate (PF) (Solu-CORTEF) injection 50 mg  50 mg Intravenous Q12H JUVENTINO    levalbuterol (XOPENEX) inhalation solution 1 25 mg  1 25 mg Nebulization TID    LORazepam (ATIVAN) 2 mg/mL injection 1 mg  1 mg Intravenous Q4H PRN    nicotine (NICODERM CQ) 7 mg/24hr TD 24 hr patch 7 mg  7 mg Transdermal Daily    nystatin (MYCOSTATIN) ointment   Topical BID    ondansetron (ZOFRAN) injection 4 mg  4 mg Intravenous Q6H PRN    [START ON 7/16/2018] oxazepam (SERAX) capsule 15 mg  15 mg Oral Daily    oxyCODONE (ROXICODONE) IR tablet 5 mg  5 mg Oral Q4H PRN    sodium chloride 0 9 % inhalation solution 3 mL  3 mL Nebulization TID    thiamine (VITAMIN B1) tablet 100 mg  100 mg Oral Daily    tiotropium (SPIRIVA) capsule for inhaler 18 mcg  18 mcg Inhalation Daily         VTE Pharmacologic Prophylaxis: Heparin  VTE Mechanical Prophylaxis: sequential compression device    Alejo Salinas MD

## 2018-07-16 LAB
ANION GAP SERPL CALCULATED.3IONS-SCNC: 7 MMOL/L (ref 4–13)
BUN SERPL-MCNC: 17 MG/DL (ref 5–25)
C DIFF TOX GENS STL QL NAA+PROBE: NORMAL
CALCIUM SERPL-MCNC: 8.2 MG/DL (ref 8.3–10.1)
CHLORIDE SERPL-SCNC: 109 MMOL/L (ref 100–108)
CO2 SERPL-SCNC: 25 MMOL/L (ref 21–32)
CREAT SERPL-MCNC: 0.88 MG/DL (ref 0.6–1.3)
ERYTHROCYTE [DISTWIDTH] IN BLOOD BY AUTOMATED COUNT: 16.3 % (ref 11.6–15.1)
GFR SERPL CREATININE-BSD FRML MDRD: 96 ML/MIN/1.73SQ M
GLUCOSE SERPL-MCNC: 90 MG/DL (ref 65–140)
HCT VFR BLD AUTO: 23.6 % (ref 36.5–49.3)
HGB BLD-MCNC: 7 G/DL (ref 12–17)
MCH RBC QN AUTO: 28.1 PG (ref 26.8–34.3)
MCHC RBC AUTO-ENTMCNC: 29.7 G/DL (ref 31.4–37.4)
MCV RBC AUTO: 95 FL (ref 82–98)
PLATELET # BLD AUTO: 281 THOUSANDS/UL (ref 149–390)
PMV BLD AUTO: 9.6 FL (ref 8.9–12.7)
POTASSIUM SERPL-SCNC: 4.1 MMOL/L (ref 3.5–5.3)
RBC # BLD AUTO: 2.49 MILLION/UL (ref 3.88–5.62)
SODIUM SERPL-SCNC: 141 MMOL/L (ref 136–145)
WBC # BLD AUTO: 16.44 THOUSAND/UL (ref 4.31–10.16)

## 2018-07-16 PROCEDURE — 94640 AIRWAY INHALATION TREATMENT: CPT

## 2018-07-16 PROCEDURE — 80048 BASIC METABOLIC PNL TOTAL CA: CPT | Performed by: INTERNAL MEDICINE

## 2018-07-16 PROCEDURE — 94760 N-INVAS EAR/PLS OXIMETRY 1: CPT

## 2018-07-16 PROCEDURE — 85027 COMPLETE CBC AUTOMATED: CPT | Performed by: INTERNAL MEDICINE

## 2018-07-16 PROCEDURE — 99232 SBSQ HOSP IP/OBS MODERATE 35: CPT | Performed by: INTERNAL MEDICINE

## 2018-07-16 PROCEDURE — 97116 GAIT TRAINING THERAPY: CPT

## 2018-07-16 RX ADMIN — OXYCODONE HYDROCHLORIDE 5 MG: 5 TABLET ORAL at 04:58

## 2018-07-16 RX ADMIN — ISODIUM CHLORIDE 3 ML: 0.03 SOLUTION RESPIRATORY (INHALATION) at 13:40

## 2018-07-16 RX ADMIN — TIOTROPIUM BROMIDE 18 MCG: 18 CAPSULE ORAL; RESPIRATORY (INHALATION) at 08:39

## 2018-07-16 RX ADMIN — HEPARIN SODIUM 5000 UNITS: 5000 INJECTION, SOLUTION INTRAVENOUS; SUBCUTANEOUS at 14:27

## 2018-07-16 RX ADMIN — NICOTINE 7 MG: 7 PATCH, EXTENDED RELEASE TRANSDERMAL at 08:38

## 2018-07-16 RX ADMIN — NYSTATIN: 100000 OINTMENT TOPICAL at 08:38

## 2018-07-16 RX ADMIN — HEPARIN SODIUM 5000 UNITS: 5000 INJECTION, SOLUTION INTRAVENOUS; SUBCUTANEOUS at 05:05

## 2018-07-16 RX ADMIN — OXYCODONE HYDROCHLORIDE 5 MG: 5 TABLET ORAL at 18:35

## 2018-07-16 RX ADMIN — HEPARIN SODIUM 5000 UNITS: 5000 INJECTION, SOLUTION INTRAVENOUS; SUBCUTANEOUS at 22:17

## 2018-07-16 RX ADMIN — NYSTATIN: 100000 OINTMENT TOPICAL at 18:02

## 2018-07-16 RX ADMIN — ISODIUM CHLORIDE 3 ML: 0.03 SOLUTION RESPIRATORY (INHALATION) at 19:25

## 2018-07-16 RX ADMIN — LEVALBUTEROL HYDROCHLORIDE 1.25 MG: 1.25 SOLUTION, CONCENTRATE RESPIRATORY (INHALATION) at 19:24

## 2018-07-16 RX ADMIN — ATORVASTATIN CALCIUM 40 MG: 40 TABLET, FILM COATED ORAL at 18:02

## 2018-07-16 RX ADMIN — ISODIUM CHLORIDE 3 ML: 0.03 SOLUTION RESPIRATORY (INHALATION) at 07:29

## 2018-07-16 RX ADMIN — OXYCODONE HYDROCHLORIDE 5 MG: 5 TABLET ORAL at 14:27

## 2018-07-16 RX ADMIN — LEVALBUTEROL HYDROCHLORIDE 1.25 MG: 1.25 SOLUTION, CONCENTRATE RESPIRATORY (INHALATION) at 13:40

## 2018-07-16 RX ADMIN — HYDROCORTISONE SODIUM SUCCINATE 25 MG: 100 INJECTION, POWDER, FOR SOLUTION INTRAMUSCULAR; INTRAVENOUS at 08:37

## 2018-07-16 RX ADMIN — Medication 100 MG: at 08:38

## 2018-07-16 RX ADMIN — AMIODARONE HYDROCHLORIDE 200 MG: 200 TABLET ORAL at 08:38

## 2018-07-16 RX ADMIN — LEVALBUTEROL HYDROCHLORIDE 1.25 MG: 1.25 SOLUTION, CONCENTRATE RESPIRATORY (INHALATION) at 07:29

## 2018-07-16 RX ADMIN — OXYCODONE HYDROCHLORIDE 5 MG: 5 TABLET ORAL at 22:17

## 2018-07-16 RX ADMIN — FOLIC ACID 1 MG: 1 TABLET ORAL at 08:38

## 2018-07-16 RX ADMIN — OXYCODONE HYDROCHLORIDE 5 MG: 5 TABLET ORAL at 09:38

## 2018-07-16 NOTE — PLAN OF CARE
Problem: PHYSICAL THERAPY ADULT  Goal: Performs mobility at highest level of function for planned discharge setting  See evaluation for individualized goals  Treatment/Interventions: Functional transfer training, LE strengthening/ROM, Therapeutic exercise, Endurance training, Cognitive reorientation, Patient/family training, Equipment eval/education, Bed mobility, Gait training  Equipment Recommended: Lj Dalton       See flowsheet documentation for full assessment, interventions and recommendations  Outcome: Progressing  Prognosis: Fair  Problem List: Decreased strength, Decreased endurance, Impaired balance, Decreased mobility, Decreased cognition, Impaired judgement, Decreased safety awareness, Pain  Assessment: Pt agitated throughout session today due to poor sleep and agitation that he still has drains in, which "is keeping him here "  Pt ambulated total of 60' with frequent halting as pt was looking for nurse & doctors throughout trial   Pt declined increased ambulation, step training, and seated exercises, stating he will do that at home  Pt very impulsive throughout session, declining to sit in chair initially, but changing mind by end of session  Pt became more agitated when given instructions to perform tasks to differently to improve safely and when this PTA attempted to guard him in hallway, demonstrating decreased safety awareness  Pt will continue to benefit from therapy services to improve mobility, strength, safety awareness and endurance to maximize independence at home  Barriers to Discharge: Inaccessible home environment     Recommendation: Home with family support, Home PT     PT - OK to Discharge: No    See flowsheet documentation for full assessment

## 2018-07-16 NOTE — RESTORATIVE TECHNICIAN NOTE
Restorative Specialist Mobility Note       Activity: Chair, Dangle, Stand at bedside, Turn, Ambulate in room, Ambulate in gleason     Assistive Device: Front wheel walker     Ambulation Response:  Tolerated fairly well  Repositioned: Sitting, Up in chair

## 2018-07-16 NOTE — PHYSICAL THERAPY NOTE
Physical Therapy Progress Note     07/16/18 0943   Pain Assessment   Pain Assessment 0-10   Pain Score 8   Pain Location Abdomen   Effect of Pain on Daily Activities limits mobility   Patient's Stated Pain Goal No pain   Hospital Pain Intervention(s) Repositioned;Distraction;Rest;Medication (See MAR)   Response to Interventions tolerated   Restrictions/Precautions   Other Precautions Contact/isolation;Agitated; Impulsive;Cognitive; Fall Risk;Pain   General   Chart Reviewed Yes   Family/Caregiver Present No   Subjective   Subjective Pt presented to therapy supine in bed, agitated, but agreeable to treat  Stateed he is waiting for "IR to remove drains so he can go home "  Pt declined LE exercises & increased ambulation distances this session  Bed Mobility   Supine to Sit 5  Supervision   Additional items Assist x 1   Transfers   Sit to Stand 5  Supervision   Additional items Assist x 1; Increased time required   Stand to Sit 5  Supervision   Additional items Assist x 1; Increased time required; Impulsive   Ambulation/Elevation   Gait pattern Excessively slow; Short stride; Inconsistent anshu;Decreased foot clearance; Forward Flexion   Gait Assistance 5  Supervision   Additional items Assist x 1   Assistive Device Rolling walker   Distance 30' x 2   Balance   Static Sitting Good   Static Standing Fair   Ambulatory Fair -   Endurance Deficit   Endurance Deficit Yes   Endurance Deficit Description limited by fatigue, pain   Activity Tolerance   Activity Tolerance Patient limited by pain;Treatment limited secondary to agitation;Patient limited by fatigue   Nurse Brenda Lindsey RN   Assessment   Prognosis Fair   Problem List Decreased strength;Decreased endurance; Impaired balance;Decreased mobility; Decreased cognition; Impaired judgement;Decreased safety awareness;Pain   Assessment Pt agitated throughout session today due to poor sleep and agitation that he still has drains in, which "is keeping him here "  Pt ambulated total of 61' with frequent halting as pt was looking for nurse & doctors throughout trial   Pt declined increased ambulation, step training, and seated exercises, stating he will do that at home  Pt very impulsive throughout session, declining to sit in chair initially, but changing mind by end of session  Pt became more agitated when given instructions to perform tasks to differently to improve safely and when this PTA attempted to guard him in hallway, demonstrating decreased safety awareness  Pt will continue to benefit from therapy services to improve mobility, strength, safety awareness and endurance to maximize independence at home  Barriers to Discharge Inaccessible home environment   Goals   Patient Goals to have drains out and go home   STG Expiration Date 07/28/18   Treatment Day 1   Plan   Treatment/Interventions Functional transfer training;LE strengthening/ROM; Therapeutic exercise; Endurance training;Cognitive reorientation;Patient/family training;Equipment eval/education; Bed mobility;Gait training   Progress Progressing toward goals   PT Frequency (3-5x/week)   Recommendation   Recommendation Home with family support;Home PT   Equipment Recommended Walker   PT - OK to Discharge No   Additional Comments pt must ambulate on 1 step     Andrés Yang, PTA

## 2018-07-17 ENCOUNTER — APPOINTMENT (INPATIENT)
Dept: RADIOLOGY | Facility: HOSPITAL | Age: 58
DRG: 720 | End: 2018-07-17
Payer: COMMERCIAL

## 2018-07-17 VITALS
WEIGHT: 137.57 LBS | DIASTOLIC BLOOD PRESSURE: 70 MMHG | SYSTOLIC BLOOD PRESSURE: 122 MMHG | RESPIRATION RATE: 20 BRPM | TEMPERATURE: 98.3 F | HEART RATE: 85 BPM | OXYGEN SATURATION: 91 % | HEIGHT: 66 IN | BODY MASS INDEX: 22.11 KG/M2

## 2018-07-17 LAB
BASOPHILS # BLD AUTO: 0.03 THOUSANDS/ΜL (ref 0–0.1)
BASOPHILS NFR BLD AUTO: 0 % (ref 0–1)
EOSINOPHIL # BLD AUTO: 0.18 THOUSAND/ΜL (ref 0–0.61)
EOSINOPHIL NFR BLD AUTO: 1 % (ref 0–6)
ERYTHROCYTE [DISTWIDTH] IN BLOOD BY AUTOMATED COUNT: 16.6 % (ref 11.6–15.1)
HCT VFR BLD AUTO: 25.7 % (ref 36.5–49.3)
HGB BLD-MCNC: 7.6 G/DL (ref 12–17)
IMM GRANULOCYTES # BLD AUTO: 0.21 THOUSAND/UL (ref 0–0.2)
IMM GRANULOCYTES NFR BLD AUTO: 1 % (ref 0–2)
LYMPHOCYTES # BLD AUTO: 2.79 THOUSANDS/ΜL (ref 0.6–4.47)
LYMPHOCYTES NFR BLD AUTO: 17 % (ref 14–44)
MCH RBC QN AUTO: 28.3 PG (ref 26.8–34.3)
MCHC RBC AUTO-ENTMCNC: 29.6 G/DL (ref 31.4–37.4)
MCV RBC AUTO: 96 FL (ref 82–98)
MONOCYTES # BLD AUTO: 1.22 THOUSAND/ΜL (ref 0.17–1.22)
MONOCYTES NFR BLD AUTO: 7 % (ref 4–12)
NEUTROPHILS # BLD AUTO: 12.01 THOUSANDS/ΜL (ref 1.85–7.62)
NEUTS SEG NFR BLD AUTO: 74 % (ref 43–75)
NRBC BLD AUTO-RTO: 0 /100 WBCS
PLATELET # BLD AUTO: 267 THOUSANDS/UL (ref 149–390)
PMV BLD AUTO: 10.5 FL (ref 8.9–12.7)
RBC # BLD AUTO: 2.69 MILLION/UL (ref 3.88–5.62)
WBC # BLD AUTO: 16.44 THOUSAND/UL (ref 4.31–10.16)

## 2018-07-17 PROCEDURE — 99238 HOSP IP/OBS DSCHRG MGMT 30/<: CPT | Performed by: INTERNAL MEDICINE

## 2018-07-17 PROCEDURE — 76080 X-RAY EXAM OF FISTULA: CPT

## 2018-07-17 PROCEDURE — 49424 ASSESS CYST CONTRAST INJECT: CPT | Performed by: RADIOLOGY

## 2018-07-17 PROCEDURE — G8988 SELF CARE GOAL STATUS: HCPCS

## 2018-07-17 PROCEDURE — 97166 OT EVAL MOD COMPLEX 45 MIN: CPT

## 2018-07-17 PROCEDURE — 94760 N-INVAS EAR/PLS OXIMETRY 1: CPT

## 2018-07-17 PROCEDURE — G8987 SELF CARE CURRENT STATUS: HCPCS

## 2018-07-17 PROCEDURE — BW111ZZ FLUOROSCOPY OF ABDOMEN AND PELVIS USING LOW OSMOLAR CONTRAST: ICD-10-PCS | Performed by: RADIOLOGY

## 2018-07-17 PROCEDURE — 94640 AIRWAY INHALATION TREATMENT: CPT

## 2018-07-17 PROCEDURE — 85025 COMPLETE CBC W/AUTO DIFF WBC: CPT | Performed by: INTERNAL MEDICINE

## 2018-07-17 PROCEDURE — G8989 SELF CARE D/C STATUS: HCPCS

## 2018-07-17 PROCEDURE — 76080 X-RAY EXAM OF FISTULA: CPT | Performed by: RADIOLOGY

## 2018-07-17 PROCEDURE — 49424 ASSESS CYST CONTRAST INJECT: CPT

## 2018-07-17 RX ORDER — NYSTATIN 100000 U/G
OINTMENT TOPICAL 2 TIMES DAILY
Qty: 30 G | Refills: 0 | Status: SHIPPED | OUTPATIENT
Start: 2018-07-17 | End: 2018-12-20 | Stop reason: ALTCHOICE

## 2018-07-17 RX ORDER — FOLIC ACID 1 MG/1
1 TABLET ORAL DAILY
Qty: 30 TABLET | Refills: 0 | Status: SHIPPED | OUTPATIENT
Start: 2018-07-18 | End: 2018-08-21 | Stop reason: SDUPTHER

## 2018-07-17 RX ORDER — ATORVASTATIN CALCIUM 40 MG/1
40 TABLET, FILM COATED ORAL
Qty: 30 TABLET | Refills: 0 | Status: SHIPPED | OUTPATIENT
Start: 2018-07-17 | End: 2018-07-17

## 2018-07-17 RX ORDER — ATORVASTATIN CALCIUM 40 MG/1
40 TABLET, FILM COATED ORAL
Qty: 30 TABLET | Refills: 0 | Status: SHIPPED | OUTPATIENT
Start: 2018-07-17 | End: 2018-09-25 | Stop reason: SDUPTHER

## 2018-07-17 RX ORDER — OXYCODONE HYDROCHLORIDE 10 MG/1
10 TABLET ORAL EVERY 4 HOURS PRN
Qty: 20 TABLET | Refills: 0 | Status: CANCELLED
Start: 2018-07-17 | End: 2018-07-27

## 2018-07-17 RX ADMIN — HEPARIN SODIUM 5000 UNITS: 5000 INJECTION, SOLUTION INTRAVENOUS; SUBCUTANEOUS at 13:44

## 2018-07-17 RX ADMIN — FOLIC ACID 1 MG: 1 TABLET ORAL at 11:14

## 2018-07-17 RX ADMIN — TIOTROPIUM BROMIDE 18 MCG: 18 CAPSULE ORAL; RESPIRATORY (INHALATION) at 11:15

## 2018-07-17 RX ADMIN — OXYCODONE HYDROCHLORIDE 5 MG: 5 TABLET ORAL at 11:15

## 2018-07-17 RX ADMIN — OXYCODONE HYDROCHLORIDE 5 MG: 5 TABLET ORAL at 16:09

## 2018-07-17 RX ADMIN — NICOTINE 7 MG: 7 PATCH, EXTENDED RELEASE TRANSDERMAL at 11:15

## 2018-07-17 RX ADMIN — NYSTATIN: 100000 OINTMENT TOPICAL at 11:15

## 2018-07-17 RX ADMIN — ISODIUM CHLORIDE 3 ML: 0.03 SOLUTION RESPIRATORY (INHALATION) at 13:29

## 2018-07-17 RX ADMIN — ATORVASTATIN CALCIUM 40 MG: 40 TABLET, FILM COATED ORAL at 16:09

## 2018-07-17 RX ADMIN — Medication 100 MG: at 11:14

## 2018-07-17 RX ADMIN — AMIODARONE HYDROCHLORIDE 200 MG: 200 TABLET ORAL at 06:48

## 2018-07-17 RX ADMIN — IOHEXOL 6 ML: 300 INJECTION, SOLUTION INTRAVENOUS at 10:53

## 2018-07-17 RX ADMIN — LEVALBUTEROL HYDROCHLORIDE 1.25 MG: 1.25 SOLUTION, CONCENTRATE RESPIRATORY (INHALATION) at 13:29

## 2018-07-17 RX ADMIN — HEPARIN SODIUM 5000 UNITS: 5000 INJECTION, SOLUTION INTRAVENOUS; SUBCUTANEOUS at 05:00

## 2018-07-17 RX ADMIN — OXYCODONE HYDROCHLORIDE 5 MG: 5 TABLET ORAL at 06:48

## 2018-07-17 RX ADMIN — OXYCODONE HYDROCHLORIDE 5 MG: 5 TABLET ORAL at 02:46

## 2018-07-17 NOTE — OCCUPATIONAL THERAPY NOTE
[address] Evaluation     Patient Name: Verito Martinez  Today's Date: 7/17/2018  Problem List  Patient Active Problem List   Diagnosis    Colitis    Spinal stenosis of cervical region    Hypertension    Pericarditis    Coronary artery disease involving native coronary artery of native heart    Leukocytosis    Asthma    Atherosclerosis of coronary artery    COPD (chronic obstructive pulmonary disease) (HCC)    Dyslipidemia    Alcohol abuse    Tobacco abuse    Alcohol dependence with withdrawal ([facility] [address] )    Acute respiratory insufficiency    Agitation    Heart failure, systolic, due to idiopathic cardiomyopathy ([facility] )    Ileostomy in place ([facility] )    Moderate protein-calorie malnutrition (HCC)    Anemia    Hypocalcemia    Perforation of colon (HCC)    Hypotension    Intra-abdominal abscess (HCC)     Past Medical History  Past Medical History:   Diagnosis Date    Anxiety     Asthma     Cardiac disease     Chest pain     Colitis     Colon polyps     COPD (chronic obstructive pulmonary disease) (HCC)     Coronary artery disease     Diverticulitis     Esophageal reflux     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HCC)     Hyperlipidemia     Hypertension     IBD (inflammatory bowel disease)     Myocardial infarction ([facility]     Old myocardial infarction     Perforation of colon (HCC)     Type 2 diabetes, diet controlled ([facility] [address] )     Ulcerative colitis ([facility] [address] )      Past Surgical History  Past Surgical History:   Procedure Laterality Date    ANGIOPLASTY      COLON SURGERY      COLONOSCOPY N/A 10/24/2016    Procedure: COLONOSCOPY;  Surgeon: Jayme Chris MD;  [facility]: [facility]; Service:     CORONARY ANGIOPLASTY WITH STENT PLACEMENT      ESOPHAGOGASTRODUODENOSCOPY N/A 10/24/2016    Procedure: ESOPHAGOGASTRODUODENOSCOPY (EGD); Surgeon: Jayem Chris MD;  [facility];   Service:     EXPLORATORY LAPAROTOMY W/ BOWEL RESECTION N/A 5/22/2018    Procedure: EXPLORATORY LAPAROTOMY, ILIOCOLECTOMY, ILIOCOLONIC ANASTAMOSIS, LOOP ILIOSTOMY, REPAIR OF SEROSAL TEAR, EXTENSIVE LYSIS OF ADHESIONS, WOUND VAC PLACEMENT;  Surgeon: Eboni Roger MD;  [facility]: [facility];  Service: Colorectal    HEMICOLECTOMY      OTHER SURGICAL HISTORY      stent indications acute myocardial infarction    SD COLONOSCOPY FLX DX W/COLLJ SPEC WHEN PFRMD N/A 2/6/2018    Procedure: COLONOSCOPY;  Surgeon: Eboni Roger MD;  [facility]: [facility]; Service: Colorectal    TONSILLECTOMY           07/17/18 1332   Note Type   Note type Eval only   Restrictions/Precautions   Weight Bearing Precautions Per Order No   Other Precautions Fall Risk;Telemetry   Pain Assessment   Pain Assessment No/denies pain   Pain Score No Pain   Home Living   Type of Home House  (0 KAREN)   Home Layout Two level; Able to live on main level with bedroom/bathroom   Bathroom Shower/Tub Tub/shower unit   [facility] Walker   Additional Comments Pt has a multi level home w/ a 1st floor s/u   Prior Function   Level of [place] Independent with ADLs and functional mobility   Lives With Daughter   Receives Help From Family   ADL Assistance Independent   IADLs Needs assistance   Falls in the last 6 months 0   Vocational Unemployed   Lifestyle   Autonomy Pt I w/ ADLs at baseline  Family shares IADLs  Pt uses RW   Reciprocal Relationships Pt lives w/ dtr and her 3 children  Children are teenage-young adult      Service to Others Not working   Intrinsic Gratification Pt is mostly sedentary   Psychosocial   Psychosocial (WDL) WDL   ADL   Where Assessed Edge of bed   Eating Assistance 5  Supervision/Setup   Eating Deficit Setup   Grooming Assistance 5  Supervision/Setup   UB Bathing Assistance 5  Supervision/Setup   LB Bathing Assistance 5  Supervision/Setup   UB Dressing Assistance 5  Supervision/Setup   LB Dressing Assistance 5  Supervision/Setup   Toileting Assistance  5  Supervision/Setup   Functional Assistance 5  Supervision/Setup   Bed Mobility   Supine to Sit 5  Supervision   Additional items Increased time required   Transfers   Sit to Stand 5  Supervision   Additional items Increased time required   Stand to Sit 5  Supervision   Additional items Increased time required   Stand pivot 5  Supervision   Additional items Increased time required   Additional Comments Pt ambulated short distance w/i room w/ RW and S  Functional Mobility   Additional items Rolling walker   Balance   Static Sitting Good   Dynamic Sitting Fair +   Static Standing Fair   Dynamic Standing Fair -   Activity Tolerance   Activity Tolerance Patient limited by fatigue   Nurse Made Aware Okay to see per RN   RUE Assessment   RUE Assessment WFL   LUE Assessment   LUE Assessment WFL   Hand Function   Gross Motor Coordination Functional   Fine Motor Coordination Functional   Cognition   Overall Cognitive Status WFL   Arousal/Participation Alert; Responsive; Cooperative   Attention Within functional limits   Orientation Level Oriented X4   Memory Within functional limits   Following Commands Follows one step commands without difficulty   Comments Pt at first not agreeable to mobility, however then was willing to participate w/ encouragement  Assessment   Limitation Decreased endurance   Prognosis Good   Assessment Pt is a 66 y o  male who was admitted to [facility] on 7/8/2018 with Intra-abdominal abscess ([facility] [address] )   Pt's comorbidities include CAD, COPD, alcohol abuse w/ withdrawal, tobacco abuse, heart failure 2* idiopathic cardiomyopathy, anemia, and hypotension  At baseline pt reports being I w/ ADLs and family shares IADLs  Pt uses RW at baseline  Pt lives in multi level home w/ 1st floor s/u  He lives w/ his dtr and her 3 children who are teenage-young adult age  Pt reports family will be able to assist upon d/c if needed   Currently pt requires S for overall ADLS and S for functional mobility/transfers w/ RW  Pt's limitations include decreased endurance  Educated pt on energy conservation and pt verbalized understanding  Pt denies any questions or concerns from an OT standpoint at this time  Rec pt cont participation in self care after s/u from nrsg staff and cont mobility w/ non OT staff while in the hospital  From an OT perspective, rec home w/ increased family support upon d/c  No further acute OT needs warranted at this time  D/C OT      Goals   Patient Goals to go home   Plan   OT Frequency Eval only   Recommendation   OT Discharge Recommendation Home with family support   OT - OK to Discharge Yes  (when medically stable )   Barthel Index   Feeding 10   Bathing 0   Grooming Score 5   Dressing Score 10   Bladder Score 10   Bowels Score 10   Toilet Use Score 10   Transfers (Bed/Chair) Score 10   Mobility (Level Surface) Score 10   Stairs Score 0   Barthel Index Score 75   Modified [place] Scale   Modified [place] Scale 3       Carol Fagan, OTR/L

## 2018-07-17 NOTE — PLAN OF CARE
Problem: Prexisting or High Potential for Compromised Skin Integrity  Goal: Skin integrity is maintained or improved  INTERVENTIONS:  - Identify patients at risk for skin breakdown  - Assess and monitor skin integrity  - Assess and monitor nutrition and hydration status  - Monitor labs (i e  albumin)  - Assess for incontinence   - Turn and reposition patient  - Assist with mobility/ambulation  - Relieve pressure over bony prominences  - Avoid friction and shearing  - Provide appropriate hygiene as needed including keeping skin clean and dry  - Evaluate need for skin moisturizer/barrier cream  - Collaborate with interdisciplinary team (i e  Nutrition, Rehabilitation, etc )   - Patient/family teaching   Outcome: Progressing      Problem: Potential for Falls  Goal: Patient will remain free of falls  INTERVENTIONS:  - Assess patient frequently for physical needs  -  Identify cognitive and physical deficits and behaviors that affect risk of falls    -  [place] fall precautions as indicated by assessment   - Educate patient/family on patient safety including physical limitations  - Instruct patient to call for assistance with activity based on assessment  - Modify environment to reduce risk of injury  - Consider OT/PT consult to assist with strengthening/mobility   Outcome: Progressing      Problem: DISCHARGE PLANNING - CARE MANAGEMENT  Goal: Discharge to post-acute care or home with appropriate resources  INTERVENTIONS:  - Conduct assessment to determine patient/family and health care team treatment goals, and need for post-acute services based on payer coverage, community resources, and patient preferences, and barriers to discharge  - Address psychosocial, clinical, and financial barriers to discharge as identified in assessment in conjunction with the patient/family and health care team  - Arrange appropriate level of post-acute services according to patient's   needs and preference and payer coverage in collaboration with the physician and health care team  - Communicate with and update the patient/family, physician, and health care team regarding progress on the discharge plan  - Arrange appropriate transportation to post-acute venues   Outcome: Progressing

## 2018-07-17 NOTE — SOCIAL WORK
Cm spoke with [facility]ITA A resident Pam 22, Pt will be discharged today has [facility] for nursing and PT  Pt will have family to transport  David Clayton and Tomi Echeverria Cm with [facility] made aware

## 2018-07-17 NOTE — DISCHARGE INSTR - AVS FIRST PAGE
Follow up with Dr Tim Summers on 7/31 at 930 am   Patient will have home PT and [facility] for home drain care

## 2018-07-17 NOTE — PROGRESS NOTES
IM Residency Progress Note   Unit/Bed#: [facility] 505-01 Encounter: [id number]  SOD Team A      Amanda Bravo 66 y o  male [id number]    Hospital Stay Days: 9      Assessment/Plan:    Principal Problem:    Intra-abdominal abscess [facility])  Active Problems:    Coronary artery disease involving native coronary artery of native heart    COPD (chronic obstructive pulmonary disease) ([facility] )    Alcohol abuse    Tobacco abuse    Alcohol dependence with withdrawal ([facility] 75 )    Heart failure, systolic, due to idiopathic cardiomyopathy ([facility] 75 )    Moderate protein-calorie malnutrition ([facility] 75 )    Anemia    Hypotension    Crohn's disease  -Prior right hemicolectomy 2005 with subsequent transverse colon perforation status post EDITH, ileocolonic anastomosis, diverting ileostomy 63/9397 complicated by intra-abdominal abscess abscess status post IR drain placement  -Monitor ileostomy/SANTA drain output, IR following up today to remove SANTA drain  -Surgery is following  -the WBC 16 K same as yesterday  -Pain management-Tylenol, oxycodone p r n      Acute encephalopathy, resolved  -Likely secondary to EtOH withdrawal  -Patient is currently denying use of alcohol at all  -May be multifactorial with stay in ICU  -Currently awake alert and oriented x3  -Plan as below for ETOH withdrawal     Hypotension secondary to dehydration from ileostomy, resolved  -Patient was on Levophed drip in the ICU yesterday on 50 mg q 24 solucortef  -Solucortef dc'd today  -Last blood pressure 119/66, has been around 120/70  -Stable  -If BP climbs to 140s can consider restarting on home lisinopril and metoprolol     Alcohol withdrawal  -Serax dc'd  -Did not require p r n  meds overnight  -Daily folate and thiamine  -Monitor     Chronic anemia  -Hemoglobin 7 6 from 7 0  -Likely AOCD in setting of Crohn's  -Monitor CBC daily, transfuse for hemoglobin less than 7     History of paroxysmal Afib  -Stable  -Continue amiodarone 200 mg p o  daily  -Monitor     CAD, status post stent in RCA in   -Continue aspirin, Lipitor     COPD  -Currently on room air  -Breath sounds normal today  -Continue Spiriva, Xopenex, and albuterol p r n   -Airway clearance protocol  -Being seen by Respiratory     Tobacco abuse  -Patient counseled on tobacco cessation  -On nicotine patch     Protein calorie nutrition  -Last albumin 1 9  -Ensure drinks     Groin rash  -Nystatin ointment b i d  Disposition: Inpatient awaiting SANTA drain removal       Subjective:   Patient had no AOE       Vitals: Temp (24hrs), Av 6 °F (37 °C), Min:98 5 °F (36 9 °C), Max:98 8 °F (37 1 °C)  Current: Temperature: 98 5 °F (36 9 °C)  Vitals:    18 1926 18 2239 18 0526 18 0700   BP:  117/63  119/66   Pulse:  82  83   Resp:  18  20   Temp:  98 5 °F (36 9 °C)  98 5 °F (36 9 °C)   TempSrc:  Oral  Oral   SpO2: 96% 99%  94%   Weight:   62 4 kg (137 lb 9 1 oz)    Height:        Body mass index is 22 2 kg/m²  I/O last 24 hours: In: 18 [P O :860]  Out: 2795 [Urine:1050; Drains:10; RNGEK:6049]      Physical Exam: General appearance: alert and oriented, in no acute distress  Head: Normocephalic, without obvious abnormality, atraumatic  Lungs: clear to auscultation bilaterally  Heart: regular rate and rhythm, S1, S2 normal, no murmur, click, rub or gallop  Abdomen: soft, non-distended, tender at site of abdominal wound, SANTA drain draining minimal serosanguinous fluid, ileostomy pink, patent, and productive  Wound dressing clean, dry, and intact    Extremities: extremities normal, warm and well-perfused; no cyanosis, clubbing, or edema  Pulses: 2+ and symmetric  Neurologic: Grossly normal     Invasive Devices     Peripheral Intravenous Line            Peripheral IV 18 Left Forearm less than 1 day          Drain            Colostomy  RUQ 55 days    Closed/Suction Drain Left Abdomen Bulb 8 5 Fr  19 days                          Labs:   Recent Results (from the past 24 hour(s))   CBC and differential    Collection Time: 07/17/18  4:52 AM   Result Value Ref Range    WBC 16 44 (H) 4 31 - 10 16 Thousand/uL    RBC 2 69 (L) 3 88 - 5 62 Million/uL    Hemoglobin 7 6 (L) 12 0 - 17 0 g/dL    Hematocrit 25 7 (L) 36 5 - 49 3 %    MCV 96 82 - 98 fL    MCH 28 3 26 8 - 34 3 pg    MCHC 29 6 (L) 31 4 - 37 4 g/dL    RDW 16 6 (H) 11 6 - 15 1 %    MPV 10 5 8 9 - 12 7 fL    Platelets 488 395 - 253 Thousands/uL    nRBC 0 /100 WBCs    Neutrophils Relative 74 43 - 75 %    Immat GRANS % 1 0 - 2 %    Lymphocytes Relative 17 14 - 44 %    Monocytes Relative 7 4 - 12 %    Eosinophils Relative 1 0 - 6 %    Basophils Relative 0 0 - 1 %    Neutrophils Absolute 12 01 (H) 1 85 - 7 62 Thousands/µL    Immature Grans Absolute 0 21 (H) 0 00 - 0 20 Thousand/uL    Lymphocytes Absolute 2 79 0 60 - 4 47 Thousands/µL    Monocytes Absolute 1 22 0 17 - 1 22 Thousand/µL    Eosinophils Absolute 0 18 0 00 - 0 61 Thousand/µL    Basophils Absolute 0 03 0 00 - 0 10 Thousands/µL       Radiology Results: I have personally reviewed pertinent reports  Other Diagnostic Testing:   I have personally reviewed pertinent reports          Active Meds:   Current Facility-Administered Medications   Medication Dose Route Frequency    acetaminophen (TYLENOL) tablet 650 mg  650 mg Oral Q6H PRN    albuterol inhalation solution 2 5 mg  2 5 mg Nebulization Q6H PRN    amiodarone tablet 200 mg  200 mg Oral Daily With Breakfast    atorvastatin (LIPITOR) tablet 40 mg  40 mg Oral Daily With Dinner    folic acid (FOLVITE) tablet 1 mg  1 mg Oral Daily    heparin (porcine) subcutaneous injection 5,000 Units  5,000 Units Subcutaneous Q8H [address]    levalbuterol (XOPENEX) inhalation solution 1 25 mg  1 25 mg Nebulization TID    LORazepam (ATIVAN) 2 mg/mL injection 1 mg  1 mg Intravenous Q4H PRN    nicotine (NICODERM CQ) 7 mg/24hr TD 24 hr patch 7 mg  7 mg Transdermal Daily    nystatin (MYCOSTATIN) ointment   Topical BID    ondansetron (ZOFRAN) injection 4 mg  4 mg Intravenous Q6H PRN    oxyCODONE (ROXICODONE) IR tablet 5 mg  5 mg Oral Q4H PRN    sodium chloride 0 9 % inhalation solution 3 mL  3 mL Nebulization TID    thiamine (VITAMIN B1) tablet 100 mg  100 mg Oral Daily    tiotropium (SPIRIVA) capsule for inhaler 18 mcg  18 mcg Inhalation Daily         VTE Pharmacologic Prophylaxis: Heparin  VTE Mechanical Prophylaxis: sequential compression device    Graciela Melchor MD

## 2018-07-17 NOTE — RESTORATIVE TECHNICIAN NOTE
Restorative Specialist Mobility Note       Pt refused ambulation attempt at this time  Will continue to follow up daily  RN aware          Martine Zeng Restorative Technician BS

## 2018-07-17 NOTE — DISCHARGE INSTRUCTIONS
TUBE CARE INSTRUCTIONS    Care after your procedure:    Resume your normal diet  Small sips of flat soda will help with nausea  2  The drainage bag/bulb may be emptied as necessary  Keep a record of the amount of fluid you drain from your tube  This should be done with clean technique as well  3  A fresh dressing should be applied daily over the tube insertion site  4  As the tube is secured to the skin with only a suture,try not to pull on your tube  Tub baths are not permitted  Showers are permitted if the patient's skin entry site is prevented from getting wet  Similarly, washcloth "baths" are acceptable  Contact Interventional Radiology at [phone] Nataly PATIENTS: Contact Interventional Radiology at [phone]) Tristin Peguero PATIENTS: Contact Interventional Radiology at [phone]) if:    1  Leakage or large amounts of liquid around the catheter  2  Fever of 101 degrees lasting several hours without other obvious cause (such as sore throat, flu, etc)  3  Persistent nausea or vomiting  4  Diminished drainage, which may be associated with pressure or pain  Or when the     drainage from your tube is less than 10mls for 48 hours  5  Catheter pulled back or falls out

## 2018-07-17 NOTE — CASE MANAGEMENT
Thank you,  Ada Aqq  291 Utilization Review Department  Phone: [phone]; Fax [phone]  ATTENTION: The Network Utilization Review Department is now centralized for our 9 Facilities  Make a note that we have a new phone and fax numbers for our Department  Please call with any questions or concerns to [phone] and carefully follow the prompts so that you are directed to the right person  All voicemails are confidential  Fax any determinations, approvals, denials, and requests for initial or continue stay review clinical to [phone]  Due to HIGH CALL volume, it would be easier if you could please send faxed requests to expedite your requests and in part, help us provide discharge notifications faster  Continued Stay Review    Date: 18 ACUTE MED SURG LEVEL OF CARE    Vital Signs: /69   Pulse 84   Temp 98 5 °F (36 9 °C) (Oral)   Resp 20   Ht 5' 6" (1 676 m)   Wt 62 4 kg (137 lb 9 1 oz)   SpO2 95%   BMI 22 20 kg/m²      Vitals: Temp (24hrs), Av 6 °F (37 °C), Min:98 5 °F (36 9 °C), Max:98 8 °F (37 1 °C)  Current: Temperature: 98 5 °F (36 9 °C)    Vitals          Vitals:     18 1926 18 2239 18 0526 18 0700   BP:   117/63   119/66   Pulse:   82   83   Resp:   18   20   Temp:   98 5 °F (36 9 °C)   98 5 °F (36 9 °C)   TempSrc:   Oral   Oral   SpO2: 96% 99%   94%   Weight:     62 4 kg (137 lb 9 1 oz)     Height:                 Body mass index is 22 2 kg/m²         I/O last 24 hours:   In: 18 [P O :860]  Out: 0979 [Urine:1050; Drains:10; SEIIZ:9476]      Diet Regular; Regular House       Dietary nutrition supplements Ensure Enlive TID with Meals      IV ACCESS      Medications:   Scheduled Meds:   Current Facility-Administered Medications:  acetaminophen 650 mg Oral Q6H PRN Fabi Sahu MD   albuterol 2 5 mg Nebulization Q6H PRN Scott Clark MD   amiodarone 200 mg Oral Daily With Mallika Walter MD atorvastatin 40 mg Oral Daily With Gregg Miles MD   [facility]olic acid 1 mg [facility]   heparin (porcine) 5,000 Units Subcutaneous [facility] Mary Holland MD   levalbuterol 1 25 mg Nebulization TID Sweta Serna MD   LORazepam 1 mg Intravenous Q4H PRN AMALIA SULLIVAN   nicotine 7 mg Transdermal Daily Mary Holland MD   nystatin  Topical BID Skyla Mcgraw PA-C   ondansetron 4 mg Intravenous Q6H PRN Evin Hayden DO   oxyCODONE 5 mg Oral Q4H PRN Shadia La MD   sodium chloride 3 mL Nebulization TID Sweta Serna MD   thiamine 100 mg Oral Daily [facility]   tiotropium 18 mcg Inhalation Daily Mary Holland MD       PRN Meds:     acetaminophen    albuterol    LORazepam    ondansetron    oxyCODONE 5 mg q4hrs prn given x 6/ 24 hrs      LABS/Diagnostic Results:   CBC and differential [[id number]] (Abnormal) Collected: 07/17/18 0452   Lab Status: Final result Specimen: Blood from Arm, Left Updated: 07/17/18 0642    WBC 16 44 (H) 4 31 - 10 16 Thousand/uL     RBC 2 69 (L) 3 88 - 5 62 Million/uL     Hemoglobin 7 6 (L) 12 0 - 17 0 g/dL     Hematocrit 25 7 (L) 36 5 - 49 3 %     MCV 96 82 - 98 fL     MCH 28 3 26 8 - 34 3 pg     MCHC 29 6 (L) 31 4 - 37 4 g/dL     RDW 16 6 (H) 11 6 - 15 1 %     MPV 10 5 8 9 - 12 7 fL     Platelets 231 735 - 101 Thousands/uL     nRBC 0 /100 WBCs     Neutrophils Relative 74 43 - 75 %     Immat GRANS % 1 0 - 2 %     Lymphocytes Relative 17 14 - 44 %     Monocytes Relative 7 4 - 12 %     Eosinophils Relative 1 0 - 6 %     Basophils Relative 0 0 - 1 %     Neutrophils Absolute 12 01 (H) 1 85 - 7 62 Thousands/µL     Immature Grans Absolute 0 21 (H) 0 00 - 0 20 Thousand/uL     Lymphocytes Absolute 2 79 0 60 - 4 47 Thousands/µL     Monocytes Absolute 1 22 0 17 - 1 22 Thousand/µL     Eosinophils Absolute 0 18 0 00 - 0 61 Thousand/µL     Basophils Absolute 0 03 0 00 - 0 10 Thousands/µL      Basic metabolic panel [[id number]] (Abnormal) Collected: 07/16/18 0440   Lab Status: Final result Specimen: Blood from Hand, Right Updated: 07/16/18 0540    Sodium 141 136 - 145 mmol/L     Potassium 4 1 3 5 - 5 3 mmol/L     Chloride 109 (H) 100 - 108 mmol/L     CO2 25 21 - 32 mmol/L     Anion Gap 7 4 - 13 mmol/L     BUN 17 5 - 25 mg/dL     Creatinine 0 88 0 60 - 1 30 mg/dL     Comment: Standardized to IDMS reference method       Glucose 90 65 - 140 mg/dL             Calcium 8 2 (L) 8 3 - 10 1 mg/dL     eGFR 96 ml/min/1 73sq m      Microbiology Results (last 21 days)   Procedure Component Value - Date/Time   Clostridium difficile toxin by PCR [[id number]] (Normal) Collected: 07/15/18 1214   Lab Status: Final result Specimen: Stool from Per Stoma Updated: 07/16/18 1630    C difficile toxin by PCR NEGATIVE for C difficle toxin by PCR  Body fluid culture and Gram stain [[id number]]    Lab Status: No result Specimen: Body Fluid    Urine culture [[id number]] (Abnormal)  Collected: 07/08/18 0553   Lab Status: Final result Specimen: Urine from Urine, Clean Catch Updated: 07/14/18 1508    Urine Culture >100,000 cfu/ml Candida albicans (A)    Comment: Presumptive        <10,000 cfu/ml Bacillus species NOT anthracis (A)   Susceptibility      Bacillus species NOT anthracis     SIRI    ZID Performed Yes                 Blood culture #1 [[id number]] Collected: 07/08/18 0234   Lab Status: Final result Specimen: Blood from Arm, Right Updated: 07/13/18 0801    Blood Culture No Growth After 5 Days  Blood culture #2 [[id number]] Collected: 07/08/18 0234   Lab Status: Final result Specimen: Blood from Arm, Left Updated: 07/13/18 0801    Blood Culture No Growth After 5 Days         Age/Sex: 66 y o  male         Assessment/Plan:  Principal Problem:    Intra-abdominal abscess ([facility] )  Active Problems:    Coronary artery disease involving native coronary artery of native heart    COPD (chronic obstructive pulmonary disease) (HCC)    Alcohol abuse    Tobacco abuse    Alcohol dependence with withdrawal (HCC)    Heart failure, systolic, due to idiopathic cardiomyopathy ([facility] )    Moderate protein-calorie malnutrition (HCC)    Anemia    Hypotension     Crohn's disease  -Prior right hemicolectomy 2005 with subsequent transverse colon perforation status post EDITH, ileocolonic anastomosis, diverting ileostomy 47/6864 complicated by intra-abdominal abscess abscess status post IR drain placement  -Monitor ileostomy/SANTA drain output, IR following up today to remove SANTA drain  -Surgery is following  -the WBC 16 K same as yesterday  -Pain management-Tylenol, oxycodone p r n      Acute encephalopathy, resolved  -Likely secondary to EtOH withdrawal  -Patient is currently denying use of alcohol at all  -May be multifactorial with stay in ICU  -Currently awake alert and oriented x3  -Plan as below for ETOH withdrawal     Hypotension secondary to dehydration from ileostomy, resolved  -Patient was on Levophed drip in the ICU yesterday on 50 mg q 24 solucortef  -Solucortef dc'd today  -Last blood pressure 119/66, has been around 120/70  -Stable  -If BP climbs to 140s can consider restarting on home lisinopril and metoprolol     Alcohol withdrawal  -Serax dc'd  -Did not require p r n  meds overnight  -Daily folate and thiamine  -Monitor     Chronic anemia  -Hemoglobin 7 6 from 7 0  -Likely AOCD in setting of Crohn's  -Monitor CBC daily, transfuse for hemoglobin less than 7     History of paroxysmal Afib  -Stable  -Continue amiodarone 200 mg p o  daily  -Monitor     CAD, status post stent in RCA in 2012  -Continue aspirin, Lipitor     COPD  -Currently on room air  -Breath sounds normal today  -Continue Spiriva, Xopenex, and albuterol p r n   -Airway clearance protocol  -Being seen by Respiratory     Tobacco abuse  -Patient counseled on tobacco cessation  -On nicotine patch     Protein calorie nutrition  -Last albumin 1 9  -Ensure drinks     Groin rash  -Nystatin ointment b i d      Disposition: Inpatient awaiting SANTA drain removal              Discharge Plan:   ANTICIPATE DISCHARGE HOME WITH [facility]/ HOME PT - WHEN MEDICALLY CLEARED    *PER PT 7/16/18  Plan   Treatment/Interventions Functional transfer training;LE strengthening/ROM; Therapeutic exercise; Endurance training;Cognitive reorientation;Patient/family training;Equipment eval/education; Bed mobility;Gait training   Progress Progressing toward goals   PT Frequency (3-5x/week)   Recommendation   Recommendation Home with family support;Home PT       CASE MANAGEMENT FOLLOWING CLOSELY FOR ALL DISCHARGE NEEDS

## 2018-07-17 NOTE — DISCHARGE SUMMARY
[address] Discharge Summary - [facility] Sherrell Aguilar 66 y o  male MRN: [id number]    [facility] Room [facility] Bed: [facility] 505/[facility] [id number] Encounter: [id number]    BRIEF OVERVIEW    Admitting Provider: Zeny Torres MD  Discharge Provider: Steve Sprague MD  Primary Care Physician at Discharge: Alaina Llanes, [address] [place] [address]  Admission Date: 7/8/2018     Discharge Date: No discharge date for patient encounter  Hospital Course  Patient is a 72-year-old male with history of right hemicolectomy, perforated transverse colon s/p EDITH, ileal colon resection, diverting ileostomy in 71/3980, complicated by intra-abdominal abscess s/p IR drain placed 06/26/2018 who presented on 07/08 with nausea and abdominal pain found to be hypotensive and in acute renal failure on evaluation in ED  Patient went to the ICU was put on a Levophed drip was put on stress dose steroids  He has also chronic alcohol user and was treated for alcohol withdrawal, he was found to be encephalopathic likely secondary to alcohol withdrawal   On 07/12/2018 he was transferred to the general medical floors after his blood pressure found to be stable around 120-140/80  He was weaned off this Serax for alcohol withdrawal and also weaned off the Solu-Cortef for his hypotension  He had a WBC that was found to be increasing but without any clinical manifestations  A repeat CT scan was done to look for intra-abdominal abscesses, but the CT scan showed no changes from the previous scan  A C diff was also done on his ileostomy output, which was found to be negative  The patient was discharged with home PT, and with home care for his SANTA drain, which was not taken out by IR during his hospital course  Due to his elevated WBC, patient needs to follow up in 1 week to have a CBC drawn  He has a follow-up appoint with Dr Rashid Wren on on 07/31/2018        Presenting Problem/History of Present Illness  Principal Problem:    Intra-abdominal abscess ([facility] [address] [facility] )  Active Problems:    Coronary artery disease involving native coronary artery of native heart    COPD (chronic obstructive pulmonary disease) ([facility])    Alcohol abuse    Tobacco abuse    Alcohol dependence with withdrawal ([facility])    Heart failure, systolic, due to idiopathic cardiomyopathy ([facility])    Moderate protein-calorie malnutrition ([facility])    Anemia    Hypotension  Resolved Problems:    Septic shock ([facility])    Hyponatremia    DEBBY (acute kidney injury) ([facility] )    UTI (urinary tract infection)    Crohn's disease  -Prior right hemicolectomy 2005 with subsequent transverse colon perforation status post EDITH, ileocolonic anastomosis, diverting ileostomy 34/0526 complicated by intra-abdominal abscess abscess status post IR drain placement  -Monitor ileostomy/SANTA drain output, IR following up today to remove SANTA drain  -Surgery is following  -the WBC 16 K same as yesterday  -Follow up CBC in one week outpatient  -Pain management-Tylenol, oxycodone p r n   -Home care for SANTA drain     Acute encephalopathy, resolved  -Likely secondary to EtOH withdrawal  -Patient is currently denying use of alcohol at all  -May be multifactorial with stay in ICU  -Currently awake alert and oriented x3  -Plan as below for ETOH withdrawal     Hypotension secondary to dehydration from ileostomy, resolved  -Patient was on Levophed drip in the ICU yesterday on 50 mg q 24 solucortef  -Solucortef dc'd today  -Last blood pressure 119/66, has been around 120/70  -Stable  -If BP climbs to 140s can consider restarting on home lisinopril and metoprolol     Alcohol withdrawal  -Serax dc'd  -Did not require p r n  meds overnight  -Daily folate and thiamine  -Monitor     Chronic anemia  -Hemoglobin 7 6 from 7 0  -Likely AOCD in setting of Crohn's  -Monitor CBC daily, transfuse for hemoglobin less than 7     History of paroxysmal Afib  -Stable  -Continue amiodarone 200 mg p o  daily  -Monitor     CAD, status post stent in RCA in 2012  -Continue aspirin, Lipitor     COPD  -Currently on room air  -Breath sounds normal today  -Continue Spiriva, Xopenex, and albuterol p r n   -Airway clearance protocol  -Being seen by Respiratory     Tobacco abuse  -Patient counseled on tobacco cessation  -On nicotine patch     Protein calorie nutrition  -Last albumin 1 9  -Ensure drinks     Groin rash  -Nystatin ointment b i d  Diagnostic Procedures Performed  Imaging Studies:  Ct Abdomen Pelvis Wo Contrast    Result Date: 7/8/2018  Impression: 1  Irregular tract containing gas adjacent to the ileocolic anastomosis has decreased in size however persistent sinus tract/leak is not excluded  2   Collection associated with the left-sided pigtail catheter is nearly resolved  3   Persistent mesenteric edema and fluid with loculated sub-3 cm collections likely involving the inferior tip of the spleen, near the aortic bifurcation, and in the right hemiabdomen are stable and may represent abscesses  Workstation performed: [id number]     Xr Chest Portable    Result Date: 7/9/2018  Impression: No pneumothorax following central line placement  Left retrocardiac atelectasis or infiltrate  Workstation performed: [id number]     Xr Chest 1 View Portable    Result Date: 7/9/2018  Impression: No acute cardiopulmonary disease  Workstation performed: [id number]     Ir Tube Check    Result Date: 7/9/2018  Impression: Impression: Persistent residual collection with purulent drainage  The catheter functions well  The catheter was left in place  Patient will return in several days for follow-up  Workstation performed: [id number]     Xr Chest Portable Icu    Result Date: 7/10/2018  Impression: No pneumothorax status post right internal jugular central line placement  Persistent small left pleural effusion and atelectasis   Workstation performed: [id number]     Pertinent Labs:      Results from last 7 days  Lab Units 07/16/18  0440 07/15/18  0435 07/14/18  0458 07/13/18  0529 07/12/18  0439   SODIUM mmol/L 141 142 141 140 139   POTASSIUM mmol/L 4 1 4 4 4 3 3 6 3 8   CHLORIDE mmol/L 109* 111* 109* 109* 109*   CO2 mmol/L 25 25 25 23 23   BUN mg/dL 17 17 12 8 5   CREATININE mg/dL 0 88 0 89 0 79 0 91 0 74   CALCIUM mg/dL 8 2* 8 1* 8 1* 8 0* 8 0*   TOTAL PROTEIN g/dL  --   --   --  5 6* 5 6*   BILIRUBIN TOTAL mg/dL  --   --   --  0 15* 0 17*   ALK PHOS U/L  --   --   --  80 84   ALT U/L  --   --   --  7* 7*   AST U/L  --   --   --  7 5   GLUCOSE RANDOM mg/dL 90 103 101 80 135       Therapeutic Procedures Performed  none    Test Results Pending at Discharge: CBC in one week     Medications     Medication List to be Continued at Discharge  Current Discharge Medication List      CONTINUE these medications which have NOT CHANGED    Details   acetaminophen (TYLENOL) 650 mg CR tablet Take 1 tablet (650 mg total) by mouth every 8 (eight) hours as needed for mild pain  Qty: 90 tablet, Refills: 0    Associated Diagnoses: Colitis     Atorvastatin 40 mg   albuterol (2 5 mg/3 mL) 0 083 % nebulizer solution Take 3 mL (2 5 mg total) by nebulization every 4 (four) hours as needed for wheezing as needed for wheezing  Qty: 150 mL, Refills: 0    Associated Diagnoses: COPD (chronic obstructive pulmonary disease) ([facility])      amiodarone 200 mg tablet Take 1 tablet (200 mg total) by mouth daily  Qty: 30 tablet, Refills: 0    Associated Diagnoses: Atrial fibrillation with RVR ([facility])      aspirin 81 mg chewable tablet Chew 1 tablet (81 mg total) daily  Qty: 30 tablet, Refills: 0    Associated Diagnoses: Coronary artery disease involving native coronary artery of native heart without angina pectoris      gabapentin (NEURONTIN) 400 mg capsule Take 1 capsule (400 mg total) by mouth 3 (three) times a day  Qty: 90 capsule, Refills: 0    Associated Diagnoses: Cervical radiculopathy      lisinopril (ZESTRIL) 2 5 mg tablet Take 1 tablet (2 5 mg total) by mouth daily  Qty: 30 tablet, Refills: 0 Associated Diagnoses: Essential hypertension      mesalamine (PENTASA) 500 mg CR capsule Pentasa 500 mg capsule,controlled release      metoprolol succinate (TOPROL-XL) 50 mg 24 hr tablet Take 1 tablet (50 mg total) by mouth daily  Qty: 30 tablet, Refills: 0    Associated Diagnoses: Atrial fibrillation with RVR ([facility])      mometasone-formoterol (DULERA) 200-5 MCG/ACT inhaler Dulera 200 mcg-5 mcg/actuation HFA aerosol inhaler      Multiple Vitamins-Minerals (CENTRUM SILVER 50+MEN PO) Centrum Silver Men      Nebulizers (AERONEB GO NEBULIZER HANDSET) MISC by Does not apply route 2 (two) times a day as needed (wheezing)  Qty: 1 each, Refills: 0    Associated Diagnoses: Uncomplicated asthma, unspecified asthma severity, unspecified whether persistent      nicotine (NICODERM CQ) 7 mg/24hr TD 24 hr patch Place 1 patch on the skin every 24 hours  Qty: 28 patch, Refills: 0    Associated Diagnoses: Nicotine dependence      Tiotropium Bromide Monohydrate (SPIRIVA RESPIMAT) 2 5 MCG/ACT AERS Inhale 1 Act (2 5 mcg total) daily  Qty: 1 Inhaler, Refills: 0    Associated Diagnoses: COPD with exacerbation ([facility])      traMADol (ULTRAM) 50 mg tablet Take 100 mg by mouth every 12 (twelve) hours  Refills: 0      VENTOLIN  (90 Base) MCG/ACT inhaler Inhale 2 puffs every 4 (four) hours as needed for wheezing or shortness of breath  Qty: 1 Inhaler, Refills: 0    Associated Diagnoses: COPD (chronic obstructive pulmonary disease) ([facility])      vitamin A 10,000 units capsule Take 3 capsules (30,000 Units total) by mouth daily  Qty: 10 capsule, Refills: 0    Associated Diagnoses: Colitis           Current Discharge Medication List      START taking these medications    Details   folic acid (FOLVITE) 1 mg tablet Take 1 tablet (1 mg total) by mouth daily  Qty: 30 tablet, Refills: 0    Associated Diagnoses: Alcohol dependence with withdrawal with complication ([facility])      nystatin (MYCOSTATIN) ointment Apply topically 2 (two) times a day  Qty: 30 g, Refills: 0    Associated Diagnoses: Groin rash           Current Discharge Medication List      STOP taking these medications       oxyCODONE (ROXICODONE) 10 MG TABS Comments:   Reason for Stopping:               Allergies  Allergies   Allergen Reactions    Other      Brown cloth band aids       Latex Rash     Discharge Diet: cardiac diet  Activity restrictions: no sports  Discharge Condition: fair  Discharged With Lines: yes    Type: SANTA Drain  Reason for line: Draining intraabdominal abscess  When to remove: As per AVN  Line managed by: Home AVN        Discharge Disposition: Home with [address] [facility] / Family Member Name: Home      Outpatient Follow-Up  yes      Follow up: Lilo Manrique DO  Date and time: 7/31/18 8 am  Location: [address] [place] [address]  Follow up within next: 7-14 days      Code Status: Level 1 - Full Code    Discharge  Statement   I spent 30 minutes minutes discharging the patient  This time was spent on the day of discharge  I had direct contact with the patient on the day of discharge  Additional documentation is required if more than 30 minutes were spent on discharge

## 2018-07-18 ENCOUNTER — PATIENT OUTREACH (OUTPATIENT)
Dept: INTERNAL MEDICINE CLINIC | Facility: CLINIC | Age: 58
End: 2018-07-18

## 2018-07-18 NOTE — PROGRESS NOTES
Pt returned my call from earlier this afternoon  Pt reports he is feeling well and does not have any questions at this time, but worried how he is going to make it to his apt on 7/31 with PCP office  Pt lives with his daughter and her 3 children  His grandchildren are unable to take him to appt's and daughter works and pt is unable to drive at this time  I will look into options for pt  I did request a  consult with [facility] for help with transportation and helping pt fill out a [employer] application as pt is agreeable to using [employer] if approved  Possible Lyft or taxi voucher to get to apt on 7/31 if daughter unable to bring him  Pt made aware of cardiology apt tomorrow 7/19  Pt reports he was unaware of this apt and will be calling the office to r/s  Pt reports he needs to f/u with surgeon as well and will call for f/u appt  Pt reports is currently using a walker and is independent with ADL's but grandkids are helping and available  Pt is aware [facility] will be out to see him tomorrow  Pt is asking about bloodwork  Pt is aware he is due for a CBC next week and [facility] can get that bloodwork  Pt has SANTA drain in place and is measuring output but reports has been less than 10 ml today and was told if less than 10 ml for 3 days should call IR for possible removal  Pt has ileostomy and is able to care for it on his own  Pt reports needs to change bag and wafer today, and daughter available to help as needed  Pt reports has all his medication and daughter helped him go over all his meds when he came home from the hospital  Pt is able to manage and take his medication  I requested to review meds with pt and he declined at this time  Pt reports he has all his meds and taking as prescribed  Pt denies any fever, chills, pain, N/V, CP, or SOB at this time  I explained my role to pt and he has my contact # [phone] and is agreeable for further outreach

## 2018-07-19 ENCOUNTER — TRANSITIONAL CARE MANAGEMENT (OUTPATIENT)
Dept: INTERNAL MEDICINE CLINIC | Facility: CLINIC | Age: 58
End: 2018-07-19

## 2018-07-23 ENCOUNTER — TELEPHONE (OUTPATIENT)
Dept: INTERNAL MEDICINE CLINIC | Facility: CLINIC | Age: 58
End: 2018-07-23

## 2018-07-23 ENCOUNTER — LAB REQUISITION (OUTPATIENT)
Dept: LAB | Facility: HOSPITAL | Age: 58
End: 2018-07-23
Payer: COMMERCIAL

## 2018-07-23 DIAGNOSIS — D72.10 EOSINOPHILIA: ICD-10-CM

## 2018-07-23 LAB
BASOPHILS # BLD AUTO: 0.02 THOUSANDS/ΜL (ref 0–0.1)
BASOPHILS NFR BLD AUTO: 0 % (ref 0–1)
EOSINOPHIL # BLD AUTO: 0.22 THOUSAND/ΜL (ref 0–0.61)
EOSINOPHIL NFR BLD AUTO: 1 % (ref 0–6)
ERYTHROCYTE [DISTWIDTH] IN BLOOD BY AUTOMATED COUNT: 16.9 % (ref 11.6–15.1)
HCT VFR BLD AUTO: 27.6 % (ref 36.5–49.3)
HGB BLD-MCNC: 8.2 G/DL (ref 12–17)
IMM GRANULOCYTES # BLD AUTO: 0.15 THOUSAND/UL (ref 0–0.2)
IMM GRANULOCYTES NFR BLD AUTO: 1 % (ref 0–2)
LYMPHOCYTES # BLD AUTO: 2.02 THOUSANDS/ΜL (ref 0.6–4.47)
LYMPHOCYTES NFR BLD AUTO: 11 % (ref 14–44)
MCH RBC QN AUTO: 28.5 PG (ref 26.8–34.3)
MCHC RBC AUTO-ENTMCNC: 29.7 G/DL (ref 31.4–37.4)
MCV RBC AUTO: 96 FL (ref 82–98)
MONOCYTES # BLD AUTO: 1.01 THOUSAND/ΜL (ref 0.17–1.22)
MONOCYTES NFR BLD AUTO: 6 % (ref 4–12)
NEUTROPHILS # BLD AUTO: 14.44 THOUSANDS/ΜL (ref 1.85–7.62)
NEUTS SEG NFR BLD AUTO: 81 % (ref 43–75)
NRBC BLD AUTO-RTO: 0 /100 WBCS
PLATELET # BLD AUTO: 253 THOUSANDS/UL (ref 149–390)
PMV BLD AUTO: 11.7 FL (ref 8.9–12.7)
RBC # BLD AUTO: 2.88 MILLION/UL (ref 3.88–5.62)
WBC # BLD AUTO: 17.86 THOUSAND/UL (ref 4.31–10.16)

## 2018-07-23 PROCEDURE — 85025 COMPLETE CBC W/AUTO DIFF WBC: CPT | Performed by: INTERNAL MEDICINE

## 2018-07-23 NOTE — TELEPHONE ENCOUNTER
Patient called in requesting a script of TRAMADOL 50 MG  He was given some at the hospital , so he is out now   Audrey Chavez Please inform me       ( I am aware that you can not prescribe but I want to verify if request is appropriate)

## 2018-07-24 NOTE — TELEPHONE ENCOUNTER
PT  CALLED AGAIN ASKING FOR A SCRIPT FOR TRAMADOL  HE SAID HE IS STILL HAVING PAIN FROM HIS ILEOSTOMY SURGERY  I ADVISED HIM IF THIS PAIN IS SURGERY RELATED THEN HE NEEDS TO CALL THE SURGEON (DR Sophie Cuadra AND HE SEES HIM AT HIS OFFICE) AND ASK THEM FOR THE REFILL  WE WILL NOT PRESCRIBE NARCOTICS FOR SURGERY RELATED PAIN   PT  WILL CONTACT SURGEON

## 2018-07-26 DIAGNOSIS — J45.909 ASTHMA, UNSPECIFIED ASTHMA SEVERITY, UNSPECIFIED WHETHER COMPLICATED, UNSPECIFIED WHETHER PERSISTENT: ICD-10-CM

## 2018-07-27 ENCOUNTER — PATIENT OUTREACH (OUTPATIENT)
Dept: INTERNAL MEDICINE CLINIC | Facility: CLINIC | Age: 58
End: 2018-07-27

## 2018-07-27 RX ORDER — MOMETASONE FUROATE AND FORMOTEROL FUMARATE DIHYDRATE 200; 5 UG/1; UG/1
2 AEROSOL RESPIRATORY (INHALATION) EVERY 12 HOURS
Qty: 13 INHALER | Refills: 3 | Status: SHIPPED | OUTPATIENT
Start: 2018-07-27 | End: 2018-07-31 | Stop reason: CLARIF

## 2018-07-27 NOTE — PROGRESS NOTES
Called [employer] at [phone] and spoke with Imelda Lemus @ 12:21pm on 7/27 to request ride for Tuesday 7/31  I gave them pt's name, address, and office address  I did state pt needs to be picked up at home on Tuesday 7/31 and brought to [facility] for 9:15 am (apt time 9:30am) and then will need to get picked up from [facility] and transported back home  I gave them pt's cell phone #  [phone] they report pt will receive an automated call that transportation is on the way and then a text message with drivers info and car description  I gave cost center # to them and my contact # [phone]

## 2018-07-27 NOTE — PROGRESS NOTES
Spoke with pt this morning around 9:25 am  Pt reports he is doing well and no changes since I last spoke with him  I confirmed with him that he still plans on coming to his apt on 7/31 @ 9:30 am for his hospital f/u apt at [facility]  Pt reports is using roller walker but may need assistance when getting in the lobby as he cannot walk far distances  Pt instructed to call office so we can come down to assist him if needed  Pt lives with daughter but she works full time and grandson that drives also works full time  Pt does drive but since recent hospital stay is too weak  Will set up Lyft for pt to get to appt   on Tuesday 7/31 @ 9:30 am

## 2018-07-27 NOTE — PROGRESS NOTES
Spoke with Rocio Negron at [address] [facility] who transferred me to [facility],   I explained pt's transportation issue and Fernando Harris is able to go to pt's house and have Kayleigh Gil waiver form sign for Lyft and drop it off to [facility] for ride to be scheduled for Tuesday 7/31  I emailed form to her and she dropped it off and it is scanned into pt's chart in EPIC

## 2018-07-30 ENCOUNTER — PATIENT OUTREACH (OUTPATIENT)
Dept: INTERNAL MEDICINE CLINIC | Facility: CLINIC | Age: 58
End: 2018-07-30

## 2018-07-30 NOTE — PROGRESS NOTES
Pt called office inquiring about his transportation to his appt for tomorrow  I spoke with pt and made him aware that I have setup transportation with [employer] and that [employer] has a contract with them and that is why he needed to sign waiver form on Friday so transportation could be setup  Pt is aware his appt is form 9:30 am and per the  they will have him here for 9am even though I requested 9:15 am  Pt is aware of this and that he will get an automated call that his transportation is on its way and then a text message with 's info and vehicle description  Pt is aware I gave them is cell #  I gave pt my contact # again and he will call me if he needs assistance with getting into the building  Pt is aware to bring detailed medication list with him as he is declining to bring medication bottles with him to appt  Pt further questions or concerns at this time  Pt will be using his walker tomorrow

## 2018-07-31 ENCOUNTER — OFFICE VISIT (OUTPATIENT)
Dept: INTERNAL MEDICINE CLINIC | Facility: CLINIC | Age: 58
End: 2018-07-31
Payer: COMMERCIAL

## 2018-07-31 ENCOUNTER — PATIENT OUTREACH (OUTPATIENT)
Dept: INTERNAL MEDICINE CLINIC | Facility: CLINIC | Age: 58
End: 2018-07-31

## 2018-07-31 ENCOUNTER — APPOINTMENT (OUTPATIENT)
Dept: LAB | Facility: CLINIC | Age: 58
End: 2018-07-31
Payer: COMMERCIAL

## 2018-07-31 VITALS — HEART RATE: 104 BPM | TEMPERATURE: 98.7 F | DIASTOLIC BLOOD PRESSURE: 60 MMHG | SYSTOLIC BLOOD PRESSURE: 90 MMHG

## 2018-07-31 DIAGNOSIS — F17.210 CIGARETTE NICOTINE DEPENDENCE WITHOUT COMPLICATION: ICD-10-CM

## 2018-07-31 DIAGNOSIS — K65.1 INTRA-ABDOMINAL ABSCESS (HCC): Primary | ICD-10-CM

## 2018-07-31 DIAGNOSIS — K65.1 INTRA-ABDOMINAL ABSCESS (HCC): ICD-10-CM

## 2018-07-31 DIAGNOSIS — F10.10 ALCOHOL ABUSE: ICD-10-CM

## 2018-07-31 DIAGNOSIS — J43.1 PANLOBULAR EMPHYSEMA (HCC): ICD-10-CM

## 2018-07-31 DIAGNOSIS — I48.91 ATRIAL FIBRILLATION WITH RVR (HCC): ICD-10-CM

## 2018-07-31 DIAGNOSIS — D72.19 EOSINOPHILIC LEUKOCYTOSIS: ICD-10-CM

## 2018-07-31 LAB
BASOPHILS # BLD AUTO: 0.04 THOUSANDS/ΜL (ref 0–0.1)
BASOPHILS NFR BLD AUTO: 0 % (ref 0–1)
EOSINOPHIL # BLD AUTO: 0.16 THOUSAND/ΜL (ref 0–0.61)
EOSINOPHIL NFR BLD AUTO: 1 % (ref 0–6)
ERYTHROCYTE [DISTWIDTH] IN BLOOD BY AUTOMATED COUNT: 16.5 % (ref 11.6–15.1)
HCT VFR BLD AUTO: 31.3 % (ref 36.5–49.3)
HGB BLD-MCNC: 9.6 G/DL (ref 12–17)
IMM GRANULOCYTES # BLD AUTO: 0.1 THOUSAND/UL (ref 0–0.2)
IMM GRANULOCYTES NFR BLD AUTO: 1 % (ref 0–2)
LYMPHOCYTES # BLD AUTO: 2.32 THOUSANDS/ΜL (ref 0.6–4.47)
LYMPHOCYTES NFR BLD AUTO: 15 % (ref 14–44)
MCH RBC QN AUTO: 27.7 PG (ref 26.8–34.3)
MCHC RBC AUTO-ENTMCNC: 30.7 G/DL (ref 31.4–37.4)
MCV RBC AUTO: 91 FL (ref 82–98)
MONOCYTES # BLD AUTO: 1.13 THOUSAND/ΜL (ref 0.17–1.22)
MONOCYTES NFR BLD AUTO: 7 % (ref 4–12)
NEUTROPHILS # BLD AUTO: 12.22 THOUSANDS/ΜL (ref 1.85–7.62)
NEUTS SEG NFR BLD AUTO: 76 % (ref 43–75)
NRBC BLD AUTO-RTO: 0 /100 WBCS
PLATELET # BLD AUTO: 439 THOUSANDS/UL (ref 149–390)
PMV BLD AUTO: 10.5 FL (ref 8.9–12.7)
RBC # BLD AUTO: 3.46 MILLION/UL (ref 3.88–5.62)
WBC # BLD AUTO: 15.97 THOUSAND/UL (ref 4.31–10.16)

## 2018-07-31 PROCEDURE — 1111F DSCHRG MED/CURRENT MED MERGE: CPT | Performed by: INTERNAL MEDICINE

## 2018-07-31 PROCEDURE — 85025 COMPLETE CBC W/AUTO DIFF WBC: CPT

## 2018-07-31 PROCEDURE — 36415 COLL VENOUS BLD VENIPUNCTURE: CPT

## 2018-07-31 PROCEDURE — 3725F SCREEN DEPRESSION PERFORMED: CPT | Performed by: INTERNAL MEDICINE

## 2018-07-31 PROCEDURE — 99215 OFFICE O/P EST HI 40 MIN: CPT | Performed by: INTERNAL MEDICINE

## 2018-07-31 RX ORDER — AMIODARONE HYDROCHLORIDE 200 MG/1
200 TABLET ORAL DAILY
Qty: 30 TABLET | Refills: 3 | Status: SHIPPED | OUTPATIENT
Start: 2018-07-31 | End: 2018-11-20 | Stop reason: SDUPTHER

## 2018-07-31 RX ORDER — LANOLIN ALCOHOL/MO/W.PET/CERES
100 CREAM (GRAM) TOPICAL DAILY
Qty: 30 TABLET | Refills: 3 | Status: ON HOLD | OUTPATIENT
Start: 2018-07-31 | End: 2018-10-04

## 2018-07-31 NOTE — CASE MANAGEMENT
Notification of Discharge  This is a Notification of Discharge from our facility [address]  Please be advised that this patient has been discharge from our facility  Below you will find the admission and discharge date and time including the patients disposition  PRESENTATION DATE: 7/8/2018  2:11 AM  IP ADMISSION DATE: 7/8/18 0413  DISCHARGE DATE: 7/17/2018  4:33 PM  DISPOSITION: Home with [address] [place] [address] in the [facility] by [employer] [facility] Utilization Review Department  Phone: [phone]; Fax [phone]  ATTENTION: The Network Utilization Review Department is now centralized for our 9 Facilities  Make a note that we have a new phone and fax numbers for our Department  Please call with any questions or concerns to [phone] and carefully follow the prompts so that you are directed to the right person  All voicemails are confidential  Fax any determinations, approvals, denials, and requests for initial or continue stay review clinical to [phone]  Due to HIGH CALL volume, it would be easier if you could please send faxed requests to expedite your requests and in part, help us provide discharge notifications faster

## 2018-07-31 NOTE — PROGRESS NOTES
ASSESSMENT/PLAN:    Intra-abdominal abscess  -SANTA drain still in place with minimal output  -denies any abdominal pain, fever, chills, nausea, vomiting  -repeat CBC; possible other drug abdominal abscesses causing elevated WBC  -patient scheduled for IR removal of SANTA drain on Thursday 08/02/2018 ( working on transport for patient for IR appointment)    COPD/panlobular emphysema  -patient currently compliant with all his medications  -he has as needed 2 L nasal cannula oxygen at home  -continue Spiriva, albuterol  -dulera discontinued due to insurance    Atrial fibrillation  -final stable currently rate controlled  -continue aspirin and refill amiodarone    Nicotine dependence  -currently smokes approximately a quarter pack of cigarettes per day  -patient like to address tobacco abuse at a later visit  -advised tobacco cessation  -continue 18 patch    Alcohol abuse  -patient reports last drink approximately 4 months ago  -currently not displaying any withdrawal symptoms  -continue folate  -prescribed thiamine        Health Maintenance:    0 on PHQ 9/PHQ 2 questionnaire  Advised diet and exercise  Advise tobacco and alcohol cessation  Schedule a follow-up appointment in 2-3 months    CHIEF COMPLAINT:  Transition of care visit after hospitalization    HISTORY OF PRESENT ILLNESS:    Patient is a 61-year-old male with past medical history of COPD, CAD, tobacco abuse, alcohol abuse, atrial fibrillation, systolic heart failure, Crohn's disease status post right hemicolectomy with perforated transverse colon, we will colon resection, diverting ileostomy complicated by intra-abdominal abscess visit on July 8, 2018 nausea and abdominal pain found to be hypotensive and in acute renal failure in the emergency department  Patient went to the ICU and was treated with Levophed and stress dose steroids    During his stay he was also treated for alcohol withdrawal   During his hospitalization he had WBC that was trending upwards without any clinical manifestation  Repeat CT scan was done to look for intra-abdominal abscess but CT scan showed no changes from previous scan  Patient had a SANTA drain placement for drainage of intra-abdominal abscess  Thus discharged home 07/17/2018 with outpatient follow-up with Interventional Radiology for removal of SANTA drain  Since hospital discharge patient states he has been doing well  States that there is mild tenderness around his incision sites  He Denies fever, chills, headaches, lightheadedness, dizziness, visual disturbances, sore throat, chest pain, palpitations, SOB, abdominal pain, nausea, vomiting, or trouble urinating/ defecating  He states there is minimal output from SANTA drain  Patient is wheelchair-bound and requires VNA services and aid with transportation for his medical appointments  He is compliant with all his medications is requesting refill on his amiodarone  Continue smoke approximately a quarter pack per day  He mainly smokes 2-3 cigars per day  The following portions of the patient's history were reviewed and updated as appropriate: allergies, current medications, past family history, past medical history, past social history, past surgical history and problem list     Review of Systems   Constitutional: Positive for fatigue  Negative for chills, diaphoresis and fever  HENT: Negative for congestion, drooling, sinus pain, sinus pressure and sneezing  Eyes: Negative for visual disturbance  Respiratory: Negative for cough, chest tightness, shortness of breath and wheezing  Cardiovascular: Negative for chest pain and leg swelling  Gastrointestinal: Negative for abdominal distention, abdominal pain, blood in stool, diarrhea, nausea and vomiting  Endocrine: Negative for polyuria  Genitourinary: Negative for decreased urine volume, difficulty urinating, dysuria, flank pain, frequency, hematuria and urgency     Musculoskeletal: Positive for arthralgias, back pain and gait problem  Negative for myalgias and neck pain  Skin: Positive for rash and wound  Negative for color change and pallor  Neurological: Negative for dizziness, tremors, syncope, weakness, light-headedness, numbness and headaches  OBJECTIVE:  Vitals:    07/31/18 0957   BP: 90/60   BP Location: Right arm   Patient Position: Sitting   Cuff Size: Adult   Pulse: 104   Temp: 98 7 °F (37 1 °C)   TempSrc: Oral     Physical Exam   Constitutional: He is oriented to person, place, and time  He appears well-developed  No distress  Thin frail appearance   HENT:   Head: Normocephalic and atraumatic  Right Ear: External ear normal    Left Ear: External ear normal    Nose: Nose normal    Eyes: Conjunctivae and EOM are normal  Pupils are equal, round, and reactive to light  Right eye exhibits no discharge  Left eye exhibits no discharge  No scleral icterus  Neck: No tracheal deviation present  Cardiovascular: Normal rate, regular rhythm, normal heart sounds and intact distal pulses  Exam reveals no gallop and no friction rub  No murmur heard  Pulmonary/Chest: Effort normal and breath sounds normal  No stridor  No respiratory distress  He has no wheezes  He has no rales  He exhibits no tenderness  Abdominal: Soft  Bowel sounds are normal  He exhibits no distension and no mass  There is tenderness  There is no rebound and no guarding  Tenderness around incision sites  Ileostomy bag in place without any obvious signs of infection  SANTA drain in place   Musculoskeletal: He exhibits no edema, tenderness or deformity  Neurological: He is alert and oriented to person, place, and time  No cranial nerve deficit or sensory deficit  Skin: Skin is warm and dry  He is not diaphoretic  Psychiatric: He has a normal mood and affect  Vitals reviewed          Current Outpatient Prescriptions:     albuterol (2 5 mg/3 mL) 0 083 % nebulizer solution, Take 3 mL (2 5 mg total) by nebulization every 4 (four) hours as needed for wheezing as needed for wheezing, Disp: 150 mL, Rfl: 0    amiodarone 200 mg tablet, Take 1 tablet (200 mg total) by mouth daily, Disp: 30 tablet, Rfl: 3    aspirin 81 mg chewable tablet, Chew 1 tablet (81 mg total) daily, Disp: 30 tablet, Rfl: 0    atorvastatin (LIPITOR) 40 mg tablet, Take 1 tablet (40 mg total) by mouth daily with dinner for 30 days, Disp: 30 tablet, Rfl: 0    folic acid (FOLVITE) 1 mg tablet, Take 1 tablet (1 mg total) by mouth daily, Disp: 30 tablet, Rfl: 0    gabapentin (NEURONTIN) 400 mg capsule, Take 1 capsule (400 mg total) by mouth 3 (three) times a day, Disp: 90 capsule, Rfl: 0    lisinopril (ZESTRIL) 2 5 mg tablet, Take 1 tablet (2 5 mg total) by mouth daily, Disp: 30 tablet, Rfl: 0    mesalamine (PENTASA) 500 mg CR capsule, Pentasa 500 mg capsule,controlled release, Disp: , Rfl:     metoprolol succinate (TOPROL-XL) 50 mg 24 hr tablet, Take 1 tablet (50 mg total) by mouth daily, Disp: 30 tablet, Rfl: 0    Multiple Vitamins-Minerals (CENTRUM SILVER 50+MEN PO), Centrum Silver Men, Disp: , Rfl:     nicotine (NICODERM CQ) 7 mg/24hr TD 24 hr patch, Place 1 patch on the skin every 24 hours, Disp: 28 patch, Rfl: 0    nystatin (MYCOSTATIN) ointment, Apply topically 2 (two) times a day, Disp: 30 g, Rfl: 0    Tiotropium Bromide Monohydrate (SPIRIVA RESPIMAT) 2 5 MCG/ACT AERS, Inhale 1 Act (2 5 mcg total) daily, Disp: 1 Inhaler, Rfl: 0    traMADol (ULTRAM) 50 mg tablet, Take 100 mg by mouth every 12 (twelve) hours, Disp: , Rfl: 0    VENTOLIN  (90 Base) MCG/ACT inhaler, Inhale 2 puffs every 4 (four) hours as needed for wheezing or shortness of breath, Disp: 1 Inhaler, Rfl: 0    vitamin A 10,000 units capsule, Take 3 capsules (30,000 Units total) by mouth daily, Disp: 10 capsule, Rfl: 0    Nebulizers (AERONEB GO NEBULIZER HANDSET) MISC, by Does not apply route 2 (two) times a day as needed (wheezing), Disp: 1 each, Rfl: 0    thiamine 100 MG tablet, Take 1 tablet (100 mg total) by mouth daily, Disp: 30 tablet, Rfl: 3    Past Medical History:   Diagnosis Date    Anxiety     Asthma     Cardiac disease     Chest pain     Colitis     Colon polyps     COPD (chronic obstructive pulmonary disease) (HCC)     Coronary artery disease     Diverticulitis     Esophageal reflux     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HCC)     Hyperlipidemia     Hypertension     IBD (inflammatory bowel disease)     Myocardial infarction ([facility] )     Old myocardial infarction     Perforation of colon (HCC)     Type 2 diabetes, diet controlled ([facility] )     Ulcerative colitis ([facility] )      Past Surgical History:   Procedure Laterality Date    ANGIOPLASTY      COLON SURGERY      COLONOSCOPY N/A 10/24/2016    Procedure: COLONOSCOPY;  Surgeon: Hakan Uribe MD;  [facility]: [facility]; Service:     CORONARY ANGIOPLASTY WITH STENT PLACEMENT      ESOPHAGOGASTRODUODENOSCOPY N/A 10/24/2016    Procedure: ESOPHAGOGASTRODUODENOSCOPY (EGD); Surgeon: Hakan Uribe MD;  [facility]; Service:     EXPLORATORY LAPAROTOMY W/ BOWEL RESECTION N/A 5/22/2018    Procedure: EXPLORATORY LAPAROTOMY, ILIOCOLECTOMY, ILIOCOLONIC ANASTAMOSIS, LOOP ILIOSTOMY, REPAIR OF SEROSAL TEAR, EXTENSIVE LYSIS OF ADHESIONS, WOUND VAC PLACEMENT;  Surgeon: Hakan Uribe MD;  [facility];  Service: Colorectal    HEMICOLECTOMY      OTHER SURGICAL HISTORY      stent indications acute myocardial infarction    NH COLONOSCOPY FLX DX W/COLLJ SPEC WHEN PFRMD N/A 2/6/2018    Procedure: COLONOSCOPY;  Surgeon: Hakan Uribe MD;  [facility]; Service: Colorectal    TONSILLECTOMY       Social History     Social History    Marital status:      Spouse name: N/A    Number of children: N/A    Years of education: N/A     Occupational History    Not on file       Social History Main Topics    Smoking status: Current Every Day Smoker     Packs/day: 0 00     Years: 5 00     Types: Cigars    Smokeless tobacco: Never Used      Comment: 3 cigars per day    Alcohol use No      Comment: pt "quit about 4 months ago"    Drug use: No    Sexual activity: Yes     Partners: Female     Birth control/ protection: None     Other Topics Concern    Not on file     Social History Narrative    No narrative on file     Family History   Problem Relation Age of Onset    Coronary artery disease Father     Crohn's disease Father     Stroke Father     Diabetes Family        ==  Ace Garcia 15 Internal Medicine PGY-1    [address] , [address] [address] [facility], [facility]  Office: [phone]  Fax: [phone]

## 2018-08-01 ENCOUNTER — PATIENT OUTREACH (OUTPATIENT)
Dept: INTERNAL MEDICINE CLINIC | Facility: CLINIC | Age: 58
End: 2018-08-01

## 2018-08-01 NOTE — PROGRESS NOTES
Called and spoke with pt  Wanted to f/u with him regarding [facility]  Pt reports he did call today and was approved and has temporary registration with [facility] for 30 days  Pt did setup up transportation to IR for tomorrow 8/2 and reports has apt with Dr Modesta Man with colonrectal on Tuesday 8/7 and is aware to setup for cardiology apt on 8/24  Pt has [facility] phone # and is aware will need to know address of where he is going, appt time and how long he will be there  Pt is aware he will receive a call from [facility] to setup in person evaluation to continue service with them  Pt is aware if does not get a call from them by week 3 to call and f/u with [facility]  Pt reports understanding of this  Call then got disconnected and I tried calling him back but went right to   I asked him to call me if there was anything else he wanted to talk about or had questions about  Contact # [phone] left

## 2018-08-01 NOTE — PROGRESS NOTES
Pt called me back  He used up his minutes on his phone and needed to add more  Reiterated info to pt again  No further questions or concerns at this time  Pt agreeable for further outreach

## 2018-08-02 ENCOUNTER — HOSPITAL ENCOUNTER (OUTPATIENT)
Dept: RADIOLOGY | Facility: HOSPITAL | Age: 58
Discharge: HOME | End: 2018-08-02
Admitting: RADIOLOGY
Payer: COMMERCIAL

## 2018-08-02 DIAGNOSIS — L02.91 ABSCESS: ICD-10-CM

## 2018-08-02 PROCEDURE — 76080 X-RAY EXAM OF FISTULA: CPT | Performed by: RADIOLOGY

## 2018-08-02 PROCEDURE — 49424 ASSESS CYST CONTRAST INJECT: CPT | Performed by: RADIOLOGY

## 2018-08-02 PROCEDURE — 76080 X-RAY EXAM OF FISTULA: CPT

## 2018-08-02 PROCEDURE — 49424 ASSESS CYST CONTRAST INJECT: CPT

## 2018-08-02 RX ADMIN — IOHEXOL 5 ML: 300 INJECTION, SOLUTION INTRAVENOUS at 14:34

## 2018-08-06 ENCOUNTER — LAB REQUISITION (OUTPATIENT)
Dept: LAB | Facility: HOSPITAL | Age: 58
End: 2018-08-06
Payer: COMMERCIAL

## 2018-08-06 DIAGNOSIS — K50.914: ICD-10-CM

## 2018-08-06 LAB
ANION GAP SERPL CALCULATED.3IONS-SCNC: 11 MMOL/L (ref 4–13)
BASOPHILS # BLD AUTO: 0.02 THOUSANDS/ΜL (ref 0–0.1)
BASOPHILS NFR BLD AUTO: 0 % (ref 0–1)
BUN SERPL-MCNC: 43 MG/DL (ref 5–25)
CALCIUM SERPL-MCNC: 9.1 MG/DL (ref 8.3–10.1)
CHLORIDE SERPL-SCNC: 98 MMOL/L (ref 100–108)
CO2 SERPL-SCNC: 21 MMOL/L (ref 21–32)
CREAT SERPL-MCNC: 2.42 MG/DL (ref 0.6–1.3)
EOSINOPHIL # BLD AUTO: 0.15 THOUSAND/ΜL (ref 0–0.61)
EOSINOPHIL NFR BLD AUTO: 1 % (ref 0–6)
ERYTHROCYTE [DISTWIDTH] IN BLOOD BY AUTOMATED COUNT: 15.9 % (ref 11.6–15.1)
GFR SERPL CREATININE-BSD FRML MDRD: 29 ML/MIN/1.73SQ M
GLUCOSE SERPL-MCNC: 84 MG/DL (ref 65–140)
HCT VFR BLD AUTO: 28.9 % (ref 36.5–49.3)
HGB BLD-MCNC: 9.3 G/DL (ref 12–17)
IMM GRANULOCYTES # BLD AUTO: 0.1 THOUSAND/UL (ref 0–0.2)
IMM GRANULOCYTES NFR BLD AUTO: 1 % (ref 0–2)
LYMPHOCYTES # BLD AUTO: 2.05 THOUSANDS/ΜL (ref 0.6–4.47)
LYMPHOCYTES NFR BLD AUTO: 16 % (ref 14–44)
MCH RBC QN AUTO: 27.8 PG (ref 26.8–34.3)
MCHC RBC AUTO-ENTMCNC: 32.2 G/DL (ref 31.4–37.4)
MCV RBC AUTO: 87 FL (ref 82–98)
MONOCYTES # BLD AUTO: 1 THOUSAND/ΜL (ref 0.17–1.22)
MONOCYTES NFR BLD AUTO: 8 % (ref 4–12)
NEUTROPHILS # BLD AUTO: 9.21 THOUSANDS/ΜL (ref 1.85–7.62)
NEUTS SEG NFR BLD AUTO: 74 % (ref 43–75)
NRBC BLD AUTO-RTO: 0 /100 WBCS
PLATELET # BLD AUTO: 405 THOUSANDS/UL (ref 149–390)
PMV BLD AUTO: 10.6 FL (ref 8.9–12.7)
POTASSIUM SERPL-SCNC: 4.8 MMOL/L (ref 3.5–5.3)
RBC # BLD AUTO: 3.34 MILLION/UL (ref 3.88–5.62)
SODIUM SERPL-SCNC: 130 MMOL/L (ref 136–145)
WBC # BLD AUTO: 12.53 THOUSAND/UL (ref 4.31–10.16)

## 2018-08-06 PROCEDURE — 85025 COMPLETE CBC W/AUTO DIFF WBC: CPT | Performed by: SURGERY

## 2018-08-06 PROCEDURE — 80048 BASIC METABOLIC PNL TOTAL CA: CPT | Performed by: SURGERY

## 2018-08-07 ENCOUNTER — HOSPITAL ENCOUNTER (INPATIENT)
Facility: HOSPITAL | Age: 58
LOS: 2 days | Discharge: HOME HEALTH | DRG: 422 | End: 2018-08-09
Attending: EMERGENCY MEDICINE | Admitting: INTERNAL MEDICINE
Payer: COMMERCIAL

## 2018-08-07 ENCOUNTER — APPOINTMENT (EMERGENCY)
Dept: RADIOLOGY | Facility: HOSPITAL | Age: 58
DRG: 422 | End: 2018-08-07
Payer: COMMERCIAL

## 2018-08-07 DIAGNOSIS — D64.9 ANEMIA: ICD-10-CM

## 2018-08-07 DIAGNOSIS — R10.9 ABDOMINAL PAIN: Primary | ICD-10-CM

## 2018-08-07 DIAGNOSIS — D72.829 LEUKOCYTOSIS: ICD-10-CM

## 2018-08-07 DIAGNOSIS — N17.9 AKI (ACUTE KIDNEY INJURY) (HCC): ICD-10-CM

## 2018-08-07 DIAGNOSIS — Z93.2 ILEOSTOMY IN PLACE (HCC): ICD-10-CM

## 2018-08-07 DIAGNOSIS — K65.1 INTRA-ABDOMINAL ABSCESS (HCC): ICD-10-CM

## 2018-08-07 DIAGNOSIS — R57.1 HYPOVOLEMIC SHOCK (HCC): ICD-10-CM

## 2018-08-07 DIAGNOSIS — I95.9 HYPOTENSION: ICD-10-CM

## 2018-08-07 PROBLEM — R26.2 AMBULATORY DYSFUNCTION: Status: ACTIVE | Noted: 2018-08-07

## 2018-08-07 LAB
ABO GROUP BLD: NORMAL
ALBUMIN SERPL BCP-MCNC: 3.1 G/DL (ref 3.5–5)
ALP SERPL-CCNC: 132 U/L (ref 46–116)
ALT SERPL W P-5'-P-CCNC: 22 U/L (ref 12–78)
ANION GAP SERPL CALCULATED.3IONS-SCNC: 12 MMOL/L (ref 4–13)
ANION GAP SERPL CALCULATED.3IONS-SCNC: 8 MMOL/L (ref 4–13)
APTT PPP: 29 SECONDS (ref 24–36)
AST SERPL W P-5'-P-CCNC: 13 U/L (ref 5–45)
ATRIAL RATE: 92 BPM
BACTERIA UR QL AUTO: ABNORMAL /HPF
BASOPHILS # BLD AUTO: 0.03 THOUSANDS/ΜL (ref 0–0.1)
BASOPHILS NFR BLD AUTO: 0 % (ref 0–1)
BILIRUB SERPL-MCNC: 0.19 MG/DL (ref 0.2–1)
BILIRUB UR QL STRIP: NEGATIVE
BLD GP AB SCN SERPL QL: NEGATIVE
BUN SERPL-MCNC: 38 MG/DL (ref 5–25)
BUN SERPL-MCNC: 47 MG/DL (ref 5–25)
C DIFF TOX GENS STL QL NAA+PROBE: NORMAL
CALCIUM SERPL-MCNC: 7.3 MG/DL (ref 8.3–10.1)
CALCIUM SERPL-MCNC: 9.6 MG/DL (ref 8.3–10.1)
CHLORIDE SERPL-SCNC: 112 MMOL/L (ref 100–108)
CHLORIDE SERPL-SCNC: 99 MMOL/L (ref 100–108)
CLARITY UR: CLEAR
CO2 SERPL-SCNC: 19 MMOL/L (ref 21–32)
CO2 SERPL-SCNC: 21 MMOL/L (ref 21–32)
COLOR UR: YELLOW
CREAT SERPL-MCNC: 2.52 MG/DL (ref 0.6–1.3)
CREAT SERPL-MCNC: 3.43 MG/DL (ref 0.6–1.3)
EOSINOPHIL # BLD AUTO: 0.07 THOUSAND/ΜL (ref 0–0.61)
EOSINOPHIL NFR BLD AUTO: 0 % (ref 0–6)
ERYTHROCYTE [DISTWIDTH] IN BLOOD BY AUTOMATED COUNT: 15.8 % (ref 11.6–15.1)
ERYTHROCYTE [DISTWIDTH] IN BLOOD BY AUTOMATED COUNT: 15.8 % (ref 11.6–15.1)
GFR SERPL CREATININE-BSD FRML MDRD: 19 ML/MIN/1.73SQ M
GFR SERPL CREATININE-BSD FRML MDRD: 27 ML/MIN/1.73SQ M
GLUCOSE SERPL-MCNC: 118 MG/DL (ref 65–140)
GLUCOSE SERPL-MCNC: 93 MG/DL (ref 65–140)
GLUCOSE UR STRIP-MCNC: NEGATIVE MG/DL
HCT VFR BLD AUTO: 22.5 % (ref 36.5–49.3)
HCT VFR BLD AUTO: 30.7 % (ref 36.5–49.3)
HGB BLD-MCNC: 7 G/DL (ref 12–17)
HGB BLD-MCNC: 9.8 G/DL (ref 12–17)
HGB UR QL STRIP.AUTO: NEGATIVE
HYALINE CASTS #/AREA URNS LPF: ABNORMAL /LPF
IMM GRANULOCYTES # BLD AUTO: 0.15 THOUSAND/UL (ref 0–0.2)
IMM GRANULOCYTES NFR BLD AUTO: 1 % (ref 0–2)
INR PPP: 1 (ref 0.86–1.17)
KETONES UR STRIP-MCNC: NEGATIVE MG/DL
LACTATE SERPL-SCNC: 1.1 MMOL/L (ref 0.5–2)
LEUKOCYTE ESTERASE UR QL STRIP: ABNORMAL
LIPASE SERPL-CCNC: 224 U/L (ref 73–393)
LYMPHOCYTES # BLD AUTO: 2.4 THOUSANDS/ΜL (ref 0.6–4.47)
LYMPHOCYTES NFR BLD AUTO: 13 % (ref 14–44)
MCH RBC QN AUTO: 27.6 PG (ref 26.8–34.3)
MCH RBC QN AUTO: 27.8 PG (ref 26.8–34.3)
MCHC RBC AUTO-ENTMCNC: 31.1 G/DL (ref 31.4–37.4)
MCHC RBC AUTO-ENTMCNC: 31.9 G/DL (ref 31.4–37.4)
MCV RBC AUTO: 87 FL (ref 82–98)
MCV RBC AUTO: 89 FL (ref 82–98)
MONOCYTES # BLD AUTO: 1.52 THOUSAND/ΜL (ref 0.17–1.22)
MONOCYTES NFR BLD AUTO: 8 % (ref 4–12)
MUCOUS THREADS UR QL AUTO: ABNORMAL
NEUTROPHILS # BLD AUTO: 14.81 THOUSANDS/ΜL (ref 1.85–7.62)
NEUTS SEG NFR BLD AUTO: 78 % (ref 43–75)
NITRITE UR QL STRIP: NEGATIVE
NON-SQ EPI CELLS URNS QL MICRO: ABNORMAL /HPF
NRBC BLD AUTO-RTO: 0 /100 WBCS
P AXIS: 51 DEGREES
PH UR STRIP.AUTO: 5.5 [PH] (ref 4.5–8)
PLATELET # BLD AUTO: 295 THOUSANDS/UL (ref 149–390)
PLATELET # BLD AUTO: 295 THOUSANDS/UL (ref 149–390)
PLATELET # BLD AUTO: 442 THOUSANDS/UL (ref 149–390)
PMV BLD AUTO: 10.2 FL (ref 8.9–12.7)
PMV BLD AUTO: 10.2 FL (ref 8.9–12.7)
PMV BLD AUTO: 10.4 FL (ref 8.9–12.7)
POTASSIUM SERPL-SCNC: 4 MMOL/L (ref 3.5–5.3)
POTASSIUM SERPL-SCNC: 4.1 MMOL/L (ref 3.5–5.3)
PR INTERVAL: 132 MS
PREALB SERPL-MCNC: 19.6 MG/DL (ref 18–40)
PROT SERPL-MCNC: 8.6 G/DL (ref 6.4–8.2)
PROT UR STRIP-MCNC: ABNORMAL MG/DL
PROTHROMBIN TIME: 13.3 SECONDS (ref 11.8–14.2)
QRS AXIS: 84 DEGREES
QRSD INTERVAL: 110 MS
QT INTERVAL: 388 MS
QTC INTERVAL: 479 MS
RBC # BLD AUTO: 2.52 MILLION/UL (ref 3.88–5.62)
RBC # BLD AUTO: 3.55 MILLION/UL (ref 3.88–5.62)
RBC #/AREA URNS AUTO: ABNORMAL /HPF
RH BLD: POSITIVE
SODIUM SERPL-SCNC: 132 MMOL/L (ref 136–145)
SODIUM SERPL-SCNC: 139 MMOL/L (ref 136–145)
SP GR UR STRIP.AUTO: <=1.005 (ref 1–1.03)
SPECIMEN EXPIRATION DATE: NORMAL
T WAVE AXIS: 81 DEGREES
TROPONIN I SERPL-MCNC: <0.02 NG/ML
UROBILINOGEN UR QL STRIP.AUTO: 0.2 E.U./DL
VENTRICULAR RATE: 92 BPM
WBC # BLD AUTO: 11.09 THOUSAND/UL (ref 4.31–10.16)
WBC # BLD AUTO: 18.98 THOUSAND/UL (ref 4.31–10.16)
WBC #/AREA URNS AUTO: ABNORMAL /HPF

## 2018-08-07 PROCEDURE — 99285 EMERGENCY DEPT VISIT HI MDM: CPT

## 2018-08-07 PROCEDURE — 87086 URINE CULTURE/COLONY COUNT: CPT

## 2018-08-07 PROCEDURE — 86901 BLOOD TYPING SEROLOGIC RH(D): CPT | Performed by: EMERGENCY MEDICINE

## 2018-08-07 PROCEDURE — 96375 TX/PRO/DX INJ NEW DRUG ADDON: CPT

## 2018-08-07 PROCEDURE — 87493 C DIFF AMPLIFIED PROBE: CPT | Performed by: INTERNAL MEDICINE

## 2018-08-07 PROCEDURE — 86850 RBC ANTIBODY SCREEN: CPT | Performed by: EMERGENCY MEDICINE

## 2018-08-07 PROCEDURE — 85049 AUTOMATED PLATELET COUNT: CPT | Performed by: INTERNAL MEDICINE

## 2018-08-07 PROCEDURE — 71046 X-RAY EXAM CHEST 2 VIEWS: CPT

## 2018-08-07 PROCEDURE — 85730 THROMBOPLASTIN TIME PARTIAL: CPT | Performed by: EMERGENCY MEDICINE

## 2018-08-07 PROCEDURE — 80053 COMPREHEN METABOLIC PANEL: CPT | Performed by: EMERGENCY MEDICINE

## 2018-08-07 PROCEDURE — 93010 ELECTROCARDIOGRAM REPORT: CPT | Performed by: INTERNAL MEDICINE

## 2018-08-07 PROCEDURE — 87040 BLOOD CULTURE FOR BACTERIA: CPT | Performed by: EMERGENCY MEDICINE

## 2018-08-07 PROCEDURE — 36415 COLL VENOUS BLD VENIPUNCTURE: CPT | Performed by: EMERGENCY MEDICINE

## 2018-08-07 PROCEDURE — 87147 CULTURE TYPE IMMUNOLOGIC: CPT

## 2018-08-07 PROCEDURE — 99221 1ST HOSP IP/OBS SF/LOW 40: CPT | Performed by: INTERNAL MEDICINE

## 2018-08-07 PROCEDURE — 81001 URINALYSIS AUTO W/SCOPE: CPT

## 2018-08-07 PROCEDURE — 74176 CT ABD & PELVIS W/O CONTRAST: CPT

## 2018-08-07 PROCEDURE — 84484 ASSAY OF TROPONIN QUANT: CPT | Performed by: EMERGENCY MEDICINE

## 2018-08-07 PROCEDURE — 96374 THER/PROPH/DIAG INJ IV PUSH: CPT

## 2018-08-07 PROCEDURE — 84134 ASSAY OF PREALBUMIN: CPT | Performed by: SURGERY

## 2018-08-07 PROCEDURE — 94760 N-INVAS EAR/PLS OXIMETRY 1: CPT

## 2018-08-07 PROCEDURE — 83690 ASSAY OF LIPASE: CPT | Performed by: EMERGENCY MEDICINE

## 2018-08-07 PROCEDURE — 83605 ASSAY OF LACTIC ACID: CPT | Performed by: EMERGENCY MEDICINE

## 2018-08-07 PROCEDURE — 80048 BASIC METABOLIC PNL TOTAL CA: CPT | Performed by: INTERNAL MEDICINE

## 2018-08-07 PROCEDURE — 85025 COMPLETE CBC W/AUTO DIFF WBC: CPT | Performed by: EMERGENCY MEDICINE

## 2018-08-07 PROCEDURE — 93005 ELECTROCARDIOGRAM TRACING: CPT

## 2018-08-07 PROCEDURE — 86900 BLOOD TYPING SEROLOGIC ABO: CPT | Performed by: EMERGENCY MEDICINE

## 2018-08-07 PROCEDURE — 85027 COMPLETE CBC AUTOMATED: CPT | Performed by: INTERNAL MEDICINE

## 2018-08-07 PROCEDURE — 85610 PROTHROMBIN TIME: CPT | Performed by: EMERGENCY MEDICINE

## 2018-08-07 PROCEDURE — 96361 HYDRATE IV INFUSION ADD-ON: CPT

## 2018-08-07 PROCEDURE — 94640 AIRWAY INHALATION TREATMENT: CPT

## 2018-08-07 RX ORDER — NYSTATIN 100000 U/G
OINTMENT TOPICAL 2 TIMES DAILY
Status: DISCONTINUED | OUTPATIENT
Start: 2018-08-07 | End: 2018-08-09 | Stop reason: HOSPADM

## 2018-08-07 RX ORDER — OXYCODONE HYDROCHLORIDE 5 MG/1
5 TABLET ORAL EVERY 4 HOURS PRN
Status: DISCONTINUED | OUTPATIENT
Start: 2018-08-07 | End: 2018-08-08

## 2018-08-07 RX ORDER — ALBUTEROL SULFATE 90 UG/1
2 AEROSOL, METERED RESPIRATORY (INHALATION) EVERY 4 HOURS PRN
Status: DISCONTINUED | OUTPATIENT
Start: 2018-08-07 | End: 2018-08-09 | Stop reason: HOSPADM

## 2018-08-07 RX ORDER — FENTANYL CITRATE 50 UG/ML
50 INJECTION, SOLUTION INTRAMUSCULAR; INTRAVENOUS ONCE
Status: COMPLETED | OUTPATIENT
Start: 2018-08-07 | End: 2018-08-07

## 2018-08-07 RX ORDER — SODIUM CHLORIDE, SODIUM GLUCONATE, SODIUM ACETATE, POTASSIUM CHLORIDE, MAGNESIUM CHLORIDE, SODIUM PHOSPHATE, DIBASIC, AND POTASSIUM PHOSPHATE .53; .5; .37; .037; .03; .012; .00082 G/100ML; G/100ML; G/100ML; G/100ML; G/100ML; G/100ML; G/100ML
200 INJECTION, SOLUTION INTRAVENOUS CONTINUOUS
Status: DISCONTINUED | OUTPATIENT
Start: 2018-08-07 | End: 2018-08-07

## 2018-08-07 RX ORDER — ASPIRIN 81 MG/1
81 TABLET, CHEWABLE ORAL DAILY
Status: DISCONTINUED | OUTPATIENT
Start: 2018-08-07 | End: 2018-08-09 | Stop reason: HOSPADM

## 2018-08-07 RX ORDER — ALBUMIN, HUMAN INJ 5% 5 %
12.5 SOLUTION INTRAVENOUS ONCE
Status: COMPLETED | OUTPATIENT
Start: 2018-08-07 | End: 2018-08-07

## 2018-08-07 RX ORDER — SODIUM CHLORIDE 9 MG/ML
150 INJECTION, SOLUTION INTRAVENOUS CONTINUOUS
Status: DISCONTINUED | OUTPATIENT
Start: 2018-08-07 | End: 2018-08-08

## 2018-08-07 RX ORDER — THIAMINE MONONITRATE (VIT B1) 100 MG
100 TABLET ORAL DAILY
Status: DISCONTINUED | OUTPATIENT
Start: 2018-08-07 | End: 2018-08-09 | Stop reason: HOSPADM

## 2018-08-07 RX ORDER — ATORVASTATIN CALCIUM 40 MG/1
40 TABLET, FILM COATED ORAL
Status: DISCONTINUED | OUTPATIENT
Start: 2018-08-07 | End: 2018-08-09 | Stop reason: HOSPADM

## 2018-08-07 RX ORDER — FENTANYL CITRATE 50 UG/ML
50 INJECTION, SOLUTION INTRAMUSCULAR; INTRAVENOUS EVERY 2 HOUR PRN
Status: DISCONTINUED | OUTPATIENT
Start: 2018-08-07 | End: 2018-08-07

## 2018-08-07 RX ORDER — HEPARIN SODIUM 5000 [USP'U]/ML
5000 INJECTION, SOLUTION INTRAVENOUS; SUBCUTANEOUS EVERY 8 HOURS SCHEDULED
Status: DISCONTINUED | OUTPATIENT
Start: 2018-08-07 | End: 2018-08-09 | Stop reason: HOSPADM

## 2018-08-07 RX ORDER — ALBUTEROL SULFATE 2.5 MG/3ML
2.5 SOLUTION RESPIRATORY (INHALATION) EVERY 6 HOURS PRN
Status: DISCONTINUED | OUTPATIENT
Start: 2018-08-07 | End: 2018-08-09 | Stop reason: HOSPADM

## 2018-08-07 RX ORDER — OXYCODONE HYDROCHLORIDE 10 MG/1
10 TABLET ORAL EVERY 4 HOURS PRN
Status: DISCONTINUED | OUTPATIENT
Start: 2018-08-07 | End: 2018-08-08

## 2018-08-07 RX ORDER — FENTANYL CITRATE 50 UG/ML
75 INJECTION, SOLUTION INTRAMUSCULAR; INTRAVENOUS EVERY 2 HOUR PRN
Status: DISCONTINUED | OUTPATIENT
Start: 2018-08-07 | End: 2018-08-07

## 2018-08-07 RX ORDER — CIPROFLOXACIN 2 MG/ML
400 INJECTION, SOLUTION INTRAVENOUS EVERY 24 HOURS
Status: DISCONTINUED | OUTPATIENT
Start: 2018-08-07 | End: 2018-08-08

## 2018-08-07 RX ORDER — ALBUTEROL SULFATE 90 UG/1
2 AEROSOL, METERED RESPIRATORY (INHALATION) EVERY 4 HOURS PRN
Status: DISCONTINUED | OUTPATIENT
Start: 2018-08-07 | End: 2018-08-07

## 2018-08-07 RX ORDER — ONDANSETRON 2 MG/ML
4 INJECTION INTRAMUSCULAR; INTRAVENOUS ONCE
Status: COMPLETED | OUTPATIENT
Start: 2018-08-07 | End: 2018-08-07

## 2018-08-07 RX ORDER — FOLIC ACID 1 MG/1
1 TABLET ORAL DAILY
Status: DISCONTINUED | OUTPATIENT
Start: 2018-08-07 | End: 2018-08-09 | Stop reason: HOSPADM

## 2018-08-07 RX ORDER — ACETAMINOPHEN 325 MG/1
650 TABLET ORAL EVERY 6 HOURS PRN
Status: DISCONTINUED | OUTPATIENT
Start: 2018-08-07 | End: 2018-08-09 | Stop reason: HOSPADM

## 2018-08-07 RX ORDER — NICOTINE 21 MG/24HR
1 PATCH, TRANSDERMAL 24 HOURS TRANSDERMAL DAILY
Status: DISCONTINUED | OUTPATIENT
Start: 2018-08-07 | End: 2018-08-09 | Stop reason: HOSPADM

## 2018-08-07 RX ORDER — ALBUMIN (HUMAN) 12.5 G/50ML
12.5 SOLUTION INTRAVENOUS ONCE
Status: COMPLETED | OUTPATIENT
Start: 2018-08-07 | End: 2018-08-07

## 2018-08-07 RX ORDER — ALBUTEROL SULFATE 90 UG/1
2 AEROSOL, METERED RESPIRATORY (INHALATION) 3 TIMES DAILY
Status: DISCONTINUED | OUTPATIENT
Start: 2018-08-07 | End: 2018-08-09 | Stop reason: HOSPADM

## 2018-08-07 RX ADMIN — ALBUMIN HUMAN 12.5 G: 0.05 INJECTION, SOLUTION INTRAVENOUS at 03:38

## 2018-08-07 RX ADMIN — ALBUTEROL SULFATE 2.5 MG: 2.5 SOLUTION RESPIRATORY (INHALATION) at 23:29

## 2018-08-07 RX ADMIN — NYSTATIN: 100000 OINTMENT TOPICAL at 09:22

## 2018-08-07 RX ADMIN — ALBUTEROL SULFATE 2 PUFF: 90 AEROSOL, METERED RESPIRATORY (INHALATION) at 09:22

## 2018-08-07 RX ADMIN — MESALAMINE 500 MG: 250 CAPSULE ORAL at 09:21

## 2018-08-07 RX ADMIN — FENTANYL CITRATE 75 MCG: 50 INJECTION, SOLUTION INTRAMUSCULAR; INTRAVENOUS at 03:38

## 2018-08-07 RX ADMIN — FENTANYL CITRATE 50 MCG: 50 INJECTION, SOLUTION INTRAMUSCULAR; INTRAVENOUS at 01:47

## 2018-08-07 RX ADMIN — SODIUM CHLORIDE 1000 ML: 0.9 INJECTION, SOLUTION INTRAVENOUS at 01:14

## 2018-08-07 RX ADMIN — ALBUTEROL SULFATE 2.5 MG: 2.5 SOLUTION RESPIRATORY (INHALATION) at 15:46

## 2018-08-07 RX ADMIN — SODIUM CHLORIDE 200 ML/HR: 0.9 INJECTION, SOLUTION INTRAVENOUS at 18:18

## 2018-08-07 RX ADMIN — ALBUTEROL SULFATE 2 PUFF: 90 AEROSOL, METERED RESPIRATORY (INHALATION) at 21:47

## 2018-08-07 RX ADMIN — NYSTATIN: 100000 OINTMENT TOPICAL at 18:33

## 2018-08-07 RX ADMIN — OXYCODONE HYDROCHLORIDE 10 MG: 10 TABLET ORAL at 04:34

## 2018-08-07 RX ADMIN — HEPARIN SODIUM 5000 UNITS: 5000 INJECTION, SOLUTION INTRAVENOUS; SUBCUTANEOUS at 06:12

## 2018-08-07 RX ADMIN — TIOTROPIUM BROMIDE 18 MCG: 18 CAPSULE ORAL; RESPIRATORY (INHALATION) at 09:22

## 2018-08-07 RX ADMIN — MESALAMINE 500 MG: 250 CAPSULE ORAL at 15:21

## 2018-08-07 RX ADMIN — ALBUTEROL SULFATE 2 PUFF: 90 AEROSOL, METERED RESPIRATORY (INHALATION) at 15:21

## 2018-08-07 RX ADMIN — HYDROCORTISONE SODIUM SUCCINATE 100 MG: 100 INJECTION, POWDER, FOR SOLUTION INTRAMUSCULAR; INTRAVENOUS at 06:39

## 2018-08-07 RX ADMIN — METRONIDAZOLE 500 MG: 500 INJECTION, SOLUTION INTRAVENOUS at 11:32

## 2018-08-07 RX ADMIN — Medication 30000 UNITS: at 09:21

## 2018-08-07 RX ADMIN — METRONIDAZOLE 500 MG: 500 INJECTION, SOLUTION INTRAVENOUS at 18:36

## 2018-08-07 RX ADMIN — OXYCODONE HYDROCHLORIDE 10 MG: 10 TABLET ORAL at 15:21

## 2018-08-07 RX ADMIN — SODIUM CHLORIDE 1000 ML: 0.9 INJECTION, SOLUTION INTRAVENOUS at 06:30

## 2018-08-07 RX ADMIN — ASPIRIN 81 MG 81 MG: 81 TABLET ORAL at 09:22

## 2018-08-07 RX ADMIN — SODIUM CHLORIDE 200 ML/HR: 0.9 INJECTION, SOLUTION INTRAVENOUS at 11:24

## 2018-08-07 RX ADMIN — CIPROFLOXACIN 400 MG: 2 INJECTION, SOLUTION INTRAVENOUS at 12:29

## 2018-08-07 RX ADMIN — SODIUM CHLORIDE 200 ML/HR: 0.9 INJECTION, SOLUTION INTRAVENOUS at 04:24

## 2018-08-07 RX ADMIN — ALBUMIN HUMAN 12.5 G: 0.25 SOLUTION INTRAVENOUS at 06:09

## 2018-08-07 RX ADMIN — HEPARIN SODIUM 5000 UNITS: 5000 INJECTION, SOLUTION INTRAVENOUS; SUBCUTANEOUS at 15:20

## 2018-08-07 RX ADMIN — Medication 100 MG: at 09:21

## 2018-08-07 RX ADMIN — FOLIC ACID 1 MG: 1 TABLET ORAL at 09:21

## 2018-08-07 RX ADMIN — OXYCODONE HYDROCHLORIDE 10 MG: 10 TABLET ORAL at 11:30

## 2018-08-07 RX ADMIN — OXYCODONE HYDROCHLORIDE 10 MG: 10 TABLET ORAL at 19:03

## 2018-08-07 RX ADMIN — HEPARIN SODIUM 5000 UNITS: 5000 INJECTION, SOLUTION INTRAVENOUS; SUBCUTANEOUS at 21:48

## 2018-08-07 RX ADMIN — SODIUM CHLORIDE 1000 ML: 0.9 INJECTION, SOLUTION INTRAVENOUS at 01:54

## 2018-08-07 RX ADMIN — FENTANYL CITRATE 50 MCG: 50 INJECTION, SOLUTION INTRAMUSCULAR; INTRAVENOUS at 01:13

## 2018-08-07 RX ADMIN — NICOTINE 1 PATCH: 21 PATCH, EXTENDED RELEASE TRANSDERMAL at 09:22

## 2018-08-07 RX ADMIN — ATORVASTATIN CALCIUM 40 MG: 40 TABLET, FILM COATED ORAL at 16:57

## 2018-08-07 RX ADMIN — ONDANSETRON 4 MG: 2 INJECTION, SOLUTION INTRAMUSCULAR; INTRAVENOUS at 01:13

## 2018-08-07 RX ADMIN — OXYCODONE HYDROCHLORIDE 10 MG: 10 TABLET ORAL at 23:14

## 2018-08-07 RX ADMIN — SODIUM CHLORIDE 1000 ML: 0.9 INJECTION, SOLUTION INTRAVENOUS at 02:55

## 2018-08-07 RX ADMIN — MESALAMINE 500 MG: 250 CAPSULE ORAL at 21:48

## 2018-08-07 RX ADMIN — ALBUMIN HUMAN 12.5 G: 0.05 INJECTION, SOLUTION INTRAVENOUS at 04:50

## 2018-08-07 RX ADMIN — FENTANYL CITRATE 50 MCG: 50 INJECTION, SOLUTION INTRAMUSCULAR; INTRAVENOUS at 07:56

## 2018-08-07 NOTE — RESTORATIVE TECHNICIAN NOTE
Restorative Specialist Mobility Note       Activity: Ambulate in gleason, Ambulate in room, Bathroom privileges, Dangle, Stand at bedside (Educated/encouraged pt to ambulate with assistance 3-4 x's/day  Bed alarm on   Pt callbell, phone/tray within reach )     Assistive Device: Front wheel walker             Amy GALAN, Restorative Technician, [employer] [facility]

## 2018-08-07 NOTE — SOCIAL WORK
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  Dme RW, commode & home o2 via [employer]  Pharmacy is [facility] on [address]  PCP Dr Concha Ward  Is open w  VNA for SN for ileostomy care & PT  Pt agreeable to referral to resume  States himself & his daughter do ostomy care & SL f/u  Denies any rhb  No IP MH history  States "used to drink" last drink was 5 months ago  No POA  Emergency contact, dtr, Michelle Veloz [phone]  Accepted by  [facility]  CM to follow for any additional needs  Informed will take preferences into account if any services are needed  Will have transport home  CM reviewed d/c planning process including the following: identifying help at home, patient preference for d/c planning needs, Discharge Lounge, [facility] Meds to Bed program, availability of treatment team to discuss questions or concerns patient and/or family may have regarding understanding medications and recognizing signs and symptoms once discharged  CM also encouraged patient to follow up with all recommended appointments after discharge  Patient advised of importance for patient and family to participate in managing patients medical well being

## 2018-08-07 NOTE — ED PROVIDER NOTES
History  Chief Complaint   Patient presents with    Abdominal Pain     pt had an ileostomy placed about 3 weeks ago  c/o abdominal pain and nausea all day  denies fevers     72-year-old male with past medical history of COPD, alcohol abuse, Crohn's disease, status post colectomy in 2005, evaluation of worsening abdominal pain, nausea and abdominal bloating since this morning  Patient takes tramadol which has not been helping  He denies any vomiting denies any fevers denies any chest pain or shortness of breath  Of note patient was admitted for 9 days in July for hypotension, DEBBY which was thought to be from dehydration due to his colostomy, patient was also found to have an intra-abdominal abscess status post IR tube placement which was removed on 08/03  During his hospital stay patient was also going through alcohol withdrawal in currently denies any further alcohol use  Patient states that this morning he noted that his belly was more distended, he noted sharp deep nonradiating abdominal pain around the site of his stoma  This was associated with nausea but no vomiting  He still had a output from his colostomy without any bleeding  Today the visiting nurse came to take his blood work since he has an appointment with Dr Edgar Conner tomorrow at 16:00  It appears that on that lab work he has a KI as well as elevated white blood cell count  Hemoglobin appears to be stable from his previous  Prior to Admission Medications   Prescriptions Last Dose Informant Patient Reported? Taking?    Multiple Vitamins-Minerals (CENTRUM SILVER 50+MEN PO)  Self Yes Yes   Sig: Centrum Silver Men   Nebulizers (AERONEB GO NEBULIZER HANDSET) MISC  Self No Yes   Sig: by Does not apply route 2 (two) times a day as needed (wheezing)   Tiotropium Bromide Monohydrate (SPIRIVA RESPIMAT) 2 5 MCG/ACT AERS  Self No Yes   Sig: Inhale 1 Act (2 5 mcg total) daily   VENTOLIN  (90 Base) MCG/ACT inhaler  Self No Yes   Sig: Inhale 2 puffs every 4 (four) hours as needed for wheezing or shortness of breath   albuterol (2 5 mg/3 mL) 0 083 % nebulizer solution  Self No Yes   Sig: Take 3 mL (2 5 mg total) by nebulization every 4 (four) hours as needed for wheezing as needed for wheezing   amiodarone 200 mg tablet   No Yes   Sig: Take 1 tablet (200 mg total) by mouth daily   aspirin 81 mg chewable tablet  Self No Yes   Sig: Chew 1 tablet (81 mg total) daily   atorvastatin (LIPITOR) 40 mg tablet  Self No Yes   Sig: Take 1 tablet (40 mg total) by mouth daily with dinner for 30 days   folic acid (FOLVITE) 1 mg tablet  Self No Yes   Sig: Take 1 tablet (1 mg total) by mouth daily   gabapentin (NEURONTIN) 400 mg capsule  Self No Yes   Sig: Take 1 capsule (400 mg total) by mouth 3 (three) times a day   lisinopril (ZESTRIL) 2 5 mg tablet  Self No Yes   Sig: Take 1 tablet (2 5 mg total) by mouth daily   mesalamine (PENTASA) 500 mg CR capsule  Self Yes Yes   Sig: Pentasa 500 mg capsule,controlled release   metoprolol succinate (TOPROL-XL) 50 mg 24 hr tablet  Self No Yes   Sig: Take 1 tablet (50 mg total) by mouth daily   nicotine (NICODERM CQ) 7 mg/24hr TD 24 hr patch  Self No Yes   Sig: Place 1 patch on the skin every 24 hours   nystatin (MYCOSTATIN) ointment  Self No Yes   Sig: Apply topically 2 (two) times a day   thiamine 100 MG tablet   No Yes   Sig: Take 1 tablet (100 mg total) by mouth daily   traMADol (ULTRAM) 50 mg tablet  Self Yes Yes   Sig: Take 100 mg by mouth every 12 (twelve) hours   vitamin A 10,000 units capsule  Self No Yes   Sig: Take 3 capsules (30,000 Units total) by mouth daily      Facility-Administered Medications: None       Past Medical History:   Diagnosis Date    Anxiety     Asthma     Cardiac disease     Chest pain     Colitis     Colon polyps     COPD (chronic obstructive pulmonary disease) (HCC)     Coronary artery disease     Diverticulitis     Esophageal reflux     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HCC)     Hyperlipidemia  Hypertension     IBD (inflammatory bowel disease)     [facility] [address] [facility]     Old myocardial infarction     Perforation of colon (HCC)     [facility]     Ulcerative colitis ([facility] )        Past Surgical History:   Procedure Laterality Date    ANGIOPLASTY      COLON SURGERY      COLONOSCOPY N/A 10/24/2016    Procedure: COLONOSCOPY;  Surgeon: Zeeshan Veras MD;  Location: [facility]; Service:     CORONARY ANGIOPLASTY WITH STENT PLACEMENT      ESOPHAGOGASTRODUODENOSCOPY N/A 10/24/2016    Procedure: ESOPHAGOGASTRODUODENOSCOPY (EGD); Surgeon: Zeeshan Veras MD;  [facility]: [facility]; Service:     EXPLORATORY LAPAROTOMY W/ BOWEL RESECTION N/A 5/22/2018    Procedure: EXPLORATORY LAPAROTOMY, ILIOCOLECTOMY, ILIOCOLONIC ANASTAMOSIS, LOOP ILIOSTOMY, REPAIR OF SEROSAL TEAR, EXTENSIVE LYSIS OF ADHESIONS, WOUND VAC PLACEMENT;  Surgeon: Zeeshan Veras MD;  [facility];  Service: Colorectal    HEMICOLECTOMY      OTHER SURGICAL HISTORY      stent indications acute myocardial infarction    MI COLONOSCOPY FLX DX W/COLLJ SPEC WHEN PFRMD N/A 2/6/2018    Procedure: COLONOSCOPY;  Surgeon: Zeeshan Veras MD;  [facility]: [facility]; Service: Colorectal    TONSILLECTOMY         Family History   Problem Relation Age of Onset    Coronary artery disease Father     Crohn's disease Father     Stroke Father     Diabetes Family      I have reviewed and agree with the history as documented  Social History   Substance Use Topics    Smoking status: Current Every Day Smoker     Packs/day: 0 00     Years: 5 00     Types: Cigars    Smokeless tobacco: Never Used      Comment: 3 cigars per day    Alcohol use No      Comment: pt "quit about 4 months ago"        Review of Systems   Constitutional: Negative for appetite change, chills and fever  HENT: Negative for rhinorrhea and sore throat  Eyes: Negative for photophobia and visual disturbance     Respiratory: Negative for cough and shortness of breath  Cardiovascular: Negative for chest pain and palpitations  Gastrointestinal: Positive for abdominal pain and nausea  Negative for diarrhea  Genitourinary: Negative for dysuria, frequency and urgency  Skin: Negative for rash  Neurological: Negative for dizziness and weakness  All other systems reviewed and are negative  Physical Exam  ED Triage Vitals   Temperature Pulse Respirations Blood Pressure SpO2   08/07/18 0029 08/07/18 0115 08/07/18 0029 08/07/18 0029 08/07/18 0029   (!) 97 4 °F (36 3 °C) 94 18 (!) 78/54 92 %      Temp Source Heart Rate Source Patient Position - Orthostatic VS BP Location FiO2 (%)   08/07/18 0029 08/07/18 0245 08/07/18 0245 08/07/18 0245 --   Oral Monitor Sitting Right arm       Pain Score       08/07/18 0113       7           Orthostatic Vital Signs  Vitals:    08/07/18 0345 08/07/18 0415 08/07/18 0424 08/07/18 0444   BP: (!) 88/51 (!) 78/52 (!) 80/51 (!) 81/52   Pulse: 86 84 85 89   Patient Position - Orthostatic VS: Lying Lying Lying Lying       Physical Exam   Constitutional: He is oriented to person, place, and time  He appears well-developed and well-nourished  HENT:   Head: Normocephalic and atraumatic  Right Ear: External ear normal    Left Ear: External ear normal    Mouth/Throat: Oropharynx is clear and moist    Eyes: Conjunctivae and EOM are normal  Pupils are equal, round, and reactive to light  Neck: Normal range of motion  Neck supple  No JVD present  No tracheal deviation present  Cardiovascular: Normal rate, regular rhythm and normal heart sounds  Exam reveals no gallop and no friction rub  No murmur heard  Pulmonary/Chest: Effort normal  No stridor  No respiratory distress  He has no wheezes  He has no rales  Abdominal: Soft  He exhibits no distension and no mass  There is tenderness  There is no rebound and no guarding     There is right upper quadrant ileostomy noted, pain, functional with green CT output in the bag  There is no blood  The abdomen is tender to palpation around the area of the colostomy with voluntary guarding  Musculoskeletal: Normal range of motion  He exhibits no edema  Neurological: He is alert and oriented to person, place, and time  No cranial nerve deficit  Skin: Skin is warm and dry  No rash noted  No erythema  No pallor  Psychiatric: He has a normal mood and affect  Nursing note and vitals reviewed        ED Medications  Medications   sodium chloride 0 9 % infusion (200 mL/hr Intravenous New Bag 8/7/18 0424)   oxyCODONE (ROXICODONE) IR tablet 5 mg (not administered)   oxyCODONE (ROXICODONE) immediate release tablet 10 mg (10 mg Oral Given 8/7/18 0434)   fentanyl citrate (PF) 100 MCG/2ML 75 mcg (not administered)   aspirin chewable tablet 81 mg (not administered)   atorvastatin (LIPITOR) tablet 40 mg (not administered)   folic acid (FOLVITE) tablet 1 mg (not administered)   mesalamine (PENTASA) ER capsule 500 mg (not administered)   nystatin (MYCOSTATIN) ointment (not administered)   thiamine (VITAMIN B1) tablet 100 mg (not administered)   tiotropium (SPIRIVA) capsule for inhaler 18 mcg (not administered)   albuterol (PROVENTIL HFA,VENTOLIN HFA) inhaler 2 puff (not administered)   vitamin A capsule 30,000 Units (not administered)   nicotine (NICODERM CQ) 21 mg/24 hr TD 24 hr patch 1 patch (not administered)   heparin (porcine) subcutaneous injection 5,000 Units (not administered)   acetaminophen (TYLENOL) tablet 650 mg (not administered)   albumin human (FLEXBUMIN) 25 % injection 12 5 g (not administered)   sodium chloride 0 9 % bolus 1,000 mL (0 mL Intravenous Stopped 8/7/18 0240)   ondansetron (ZOFRAN) injection 4 mg (4 mg Intravenous Given 8/7/18 0113)   fentanyl citrate (PF) 100 MCG/2ML 50 mcg (50 mcg Intravenous Given 8/7/18 0113)   sodium chloride 0 9 % bolus 1,000 mL (0 mL Intravenous Stopped 8/7/18 0154)   fentanyl citrate (PF) 100 MCG/2ML 50 mcg (50 mcg Intravenous Given 8/7/18 0147)   sodium chloride 0 9 % bolus 1,000 mL (0 mL Intravenous Stopped 8/7/18 0154)   sodium chloride 0 9 % bolus 1,000 mL (0 mL Intravenous Stopped 8/7/18 0355)   albumin human (FLEXBUMIN) 5 % injection 12 5 g (0 g Intravenous Stopped 8/7/18 0415)   albumin human (FLEXBUMIN) 5 % injection 12 5 g (12 5 g Intravenous New Bag 8/7/18 0450)       Diagnostic Studies  Results Reviewed     Procedure Component Value Units Date/Time    Urine Microscopic [[id number]] Collected:  08/07/18 0505    Lab Status:   In process Specimen:  Urine from Urine, Clean Catch Updated:  08/07/18 0500    ED Urine Macroscopic [[id number]]  (Abnormal) Collected:  08/07/18 0505    Lab Status:  Final result Specimen:  Urine Updated:  08/07/18 0454     Color, UA Yellow     Clarity, UA Clear     pH, UA 5 5     Leukocytes, UA Small (A)     Nitrite, UA Negative     Protein, UA 30 (1+) (A) mg/dl      Glucose, UA Negative mg/dl      Ketones, UA Negative mg/dl      Urobilinogen, UA 0 2 E U /dl      Bilirubin, UA Negative     Blood, UA Negative     Specific Gravity, UA <=1 005    Narrative:       CLINITEK RESULT    Clostridium difficile toxin by PCR [[id number]]     Lab Status:  No result Specimen:  Stool from Per Stoma     Basic metabolic panel [[id number]]     Lab Status:  No result Specimen:  Blood     CBC (With Platelets) [[id number]]     Lab Status:  No result Specimen:  Blood     Platelet count [[id number]]     Lab Status:  No result Specimen:  Blood     Troponin I [[id number]]  (Normal) Collected:  08/07/18 0042    Lab Status:  Final result Specimen:  Blood from Arm, Right Updated:  08/07/18 0119     Troponin I <0 02 ng/mL     Comprehensive metabolic panel [[id number]]  (Abnormal) Collected:  08/07/18 0042    Lab Status:  Final result Specimen:  Blood from Arm, Right Updated:  08/07/18 0117     Sodium 132 (L) mmol/L      Potassium 4 1 mmol/L      Chloride 99 (L) mmol/L      CO2 21 mmol/L      Anion Gap 12 mmol/L      BUN 47 (H) mg/dL      Creatinine 3 43 (H) mg/dL      Glucose 118 mg/dL      Calcium 9 6 mg/dL      AST 13 U/L      ALT 22 U/L      Alkaline Phosphatase 132 (H) U/L      Total Protein 8 6 (H) g/dL      Albumin 3 1 (L) g/dL      Total Bilirubin 0 19 (L) mg/dL      eGFR 19 ml/min/1 73sq m     Narrative:         National Kidney Disease Education Program recommendations are as follows:  GFR calculation is accurate only with a steady state creatinine  Chronic Kidney disease less than 60 ml/min/1 73 sq  meters  Kidney failure less than 15 ml/min/1 73 sq  meters  Lipase [[id number]]  (Normal) Collected:  08/07/18 0042    Lab Status:  Final result Specimen:  Blood from Arm, Right Updated:  08/07/18 0117     Lipase 224 u/L     Lactic acid, plasma [[id number]]  (Normal) Collected:  08/07/18 0042    Lab Status:  Final result Specimen:  Blood from Arm, Right Updated:  08/07/18 0117     LACTIC ACID 1 1 mmol/L     Narrative:         Result may be elevated if tourniquet was used during collection  APTT [[id number]]  (Normal) Collected:  08/07/18 0049    Lab Status:  Final result Specimen:  Blood from Arm, Right Updated:  08/07/18 0110     PTT 29 seconds     Protime-INR [[id number]]  (Normal) Collected:  08/07/18 0049    Lab Status:  Final result Specimen:  Blood from Arm, Right Updated:  08/07/18 0110     Protime 13 3 seconds      INR 1 00    Blood culture [[id number]] Collected:  08/07/18 0043    Lab Status:   In process Specimen:  Blood from Arm, Right Updated:  08/07/18 0058    CBC and differential [[id number]]  (Abnormal) Collected:  08/07/18 0042    Lab Status:  Final result Specimen:  Blood from Arm, Right Updated:  08/07/18 0057     WBC 18 98 (H) Thousand/uL      RBC 3 55 (L) Million/uL      Hemoglobin 9 8 (L) g/dL      Hematocrit 30 7 (L) %      MCV 87 fL      MCH 27 6 pg      MCHC 31 9 g/dL      RDW 15 8 (H) %      MPV 10 4 fL      Platelets 333 (H) Thousands/uL      nRBC 0 /100 WBCs      Neutrophils Relative 78 (H) %      Immat GRANS % 1 %      Lymphocytes Relative 13 (L) %      Monocytes Relative 8 %      Eosinophils Relative 0 %      Basophils Relative 0 %      Neutrophils Absolute 14 81 (H) Thousands/µL      Immature Grans Absolute 0 15 Thousand/uL      Lymphocytes Absolute 2 40 Thousands/µL      Monocytes Absolute 1 52 (H) Thousand/µL      Eosinophils Absolute 0 07 Thousand/µL      Basophils Absolute 0 03 Thousands/µL     Blood culture [[id number]] Collected:  08/07/18 0043    Lab Status: In process Specimen:  Blood from Arm, Right Updated:  08/07/18 Stanley Fruit [[id number]] Collected:  08/07/18 0042    Lab Status:  No result Specimen:  Blood from Arm, Right     APTT [[id number]] Collected:  08/07/18 0042    Lab Status:  No result Specimen:  Blood from Arm, Right     POCT urinalysis dipstick [[id number]]     Lab Status:  No result                  CT abdomen pelvis wo contrast   Final Result by Basilia Gonzales DO (08/07 0126)      Inflammatory changes in the abdomen and pelvis with small fluid collections unchanged from prior study                 Workstation performed: [id number]         XR chest 2 views    (Results Pending)         Procedures  Procedures      Phone Consults  ED Phone Contact    ED Course  ED Course as of Aug 07 0509   Tue Aug 07, 2018   0127 Procedure Note: EKG  Date/Time: 08/07/18 1:27 AM   Performed by: Darleen Humphrey  Authorized by: Darleen Humphrey  Indications / Diagnosis: Abdominal pain  ECG reviewed by me, the ED Provider: yes   The EKG demonstrates:  Rhythm: normal sinus  Intervals: normal intervals  Axis: normal axis  QRS/Blocks: normal QRS  ST Changes: No acute ST Changes, no STD/KAREN         0445 Critical care came down to evaluate the patient, being that his pressure is now improved he is mentating well he is stable to be transition to step-down 2, patient will be admitted under Dr Darryl Morrow under SOD service                                MDM  Number of Diagnoses or Management Options  Diagnosis management comments: 66year old male presents for evaluation of hypotension, abdominal pain, nausea  Will obtain lab work including CBC, CMP, lipase, lactic, blood cultures, type and screen and urinalysis  Will obtain EKG and troponin as well as CT of the abdomen  Will resuscitate patient with 2 L of IV saline, treat symptomatically for abdominal pain and nausea and Will admit patient for further care  If patient is not fluid responsive will consider stress dose steroids  CritCare Time    Disposition  Final diagnoses:   Abdominal pain   Hypotension   DEBBY (acute kidney injury) ([facility] [address] )   Leukocytosis   Anemia   Hypovolemic shock ([facility] [address] )     Time reflects when diagnosis was documented in both MDM as applicable and the Disposition within this note     Time User Action Codes Description Comment    8/7/2018  1:56 AM Richad Licona Add [R10 9] Abdominal pain     8/7/2018  3:06 AM Richad Licona Add [I95 9] Hypotension     8/7/2018  3:07 AM Richad Licona Add [N17 9] DEBBY (acute kidney injury) ([facility] [address] )     8/7/2018  3:07 AM Richad Licona Add [D72 829] Leukocytosis     8/7/2018  3:07 AM Albert Mik [D64 9] Anemia     8/7/2018  3:08 AM Richad Licona Add [R57 1] Hypovolemic shock [facility])       ED Disposition     ED Disposition Condition Comment    Admit  Case was discussed with SOD and the patient's admission status was agreed to be Admission Status: inpatient status to the service of Dr Dandre Moore  Follow-up Information    None         Patient's Medications   Discharge Prescriptions    No medications on file     No discharge procedures on file  ED Provider  Attending physically available and evaluated John Jj  ARI managed the patient along with the ED Attending      Electronically Signed by         Beryl Mcardle, MD  08/07/18 1134

## 2018-08-07 NOTE — CASE MANAGEMENT
Initial Clinical Review    Thank you,  Ada Aqq  [facility] Utilization Review Department  Phone: [phone]; Fax [phone]  ATTENTION: The Network Utilization Review Department is now centralized for our 9 Facilities  Make a note that we have a new phone and fax numbers for our Department  Please call with any questions or concerns to [phone] and carefully follow the prompts so that you are directed to the right person  All voicemails are confidential  Fax any determinations, approvals, denials, and requests for initial or continue stay review clinical to [phone]  Due to HIGH CALL volume, it would be easier if you could please send faxed requests to expedite your requests and in part, help us provide discharge notifications faster  Admission: Date/Time/Statement: 8/7/18 @ 0308     Orders Placed This Encounter   Procedures    Inpatient Admission     Standing Status:   Standing     Number of Occurrences:   1     Order Specific Question:   Admitting Physician     Answer:   Matt King [[id number]]     Order Specific Question:   Level of Care     Answer:   Medical      Order Specific Question:   Estimated length of stay     Answer:   More than 2 Midnights     Order Specific Question:   Certification     Answer:   I certify that inpatient services are medically necessary for this patient for a duration of greater than two midnights  See H&P and MD Progress Notes for additional information about the patient's course of treatment           ED: Date/Time/Mode of Arrival:   ED Arrival Information     Expected Arrival Acuity Means of Arrival Escorted By Service Admission Type    8/7/2018 00:30 8/7/2018 00:26 Emergent Ambulance [facility] Critical Care/ICU Emergency    Arrival Complaint    Abdominal Pain          Chief Complaint:   Chief Complaint   Patient presents with    Abdominal Pain     pt had an ileostomy placed about 3 weeks ago  c/o abdominal pain and nausea all day  denies fevers       History of Illness: Kevin Gonzalez is a 66 y o  male  who presented to the ED after having abdominal pain that started Monday morning and progressively worsened throughout the day  He states nothing help with this pain and that it is along his lower abdomen, rating the pain a 7-8/10  He states he had associated nausea but no vomiting with this pain, and that his ostomy had normal output  Patient denies any recent travel or sick contacts  Patient denies drug use,  states his last alcoholic drink was 5 months ago  Patient smokes cigarettes 1 pack per day since he was a teenager  IR drain placed 09/81/8053 for complicated intra-abdominal abscess status post ileostomy in 05/2018  At this previous hospital stay the patient also had alcohol withdrawal symptoms and was treated  He was also in the ICU and placed on Levophed drip and was given a stress dose of steroids to help with his hypertension  ED Vital Signs:   ED Triage Vitals   Temperature Pulse Respirations Blood Pressure SpO2   08/07/18 0029 08/07/18 0115 08/07/18 0029 08/07/18 0029 08/07/18 0029   (!) 97 4 °F (36 3 °C) 94 18 (!) 78/54 92 % RA sat      Temp Source Heart Rate Source Patient Position - Orthostatic VS BP Location FiO2 (%)   08/07/18 0029 08/07/18 0245 08/07/18 0245 08/07/18 0245 --   Oral Monitor Sitting Right arm       Pain Score       08/07/18 0113       7        Wt Readings from Last 1 Encounters:   08/07/18 61 2 kg (135 lb)       Vital Signs (abnormal): BP Range - 71/42 - 93/55    Abnormal Labs/Diagnostic Test Results:  8/6 -  - Cl 98 - Bun/cr 43/2  42- Wbc 12 53 - H/H 9 3/28  9- Trop 0 02    8/7 00:42 - Na 132 - cl 99- Bun/cr 47/3 43 - Alk Phos 132 - T Protein 8 6 - Albumin 3 1 - T Bili 0 19- Wbc 18 98 - H/H 9 8/30 7    8/7 - 07:19 - Cl 112 - Bun/cr 38/2  46- Ca; 7 3 - Wbc 11 09 - H/H 7 0/22 5  Urine - Leuk - small - Protein 30 -   Blood Cx  Urine Cx     CXR - L basilar atelectasis     Ct A & P - Inflammatory changes in the abdomen and pelvis with small fluid collections unchanged from prior study    ED Treatment:   Medication Administration from 08/07/2018 0026 to 08/07/2018 0515       Date/Time Order Dose Route Action     08/07/2018 0114 sodium chloride 0 9 % bolus 1,000 mL 1,000 mL Intravenous New Bag     08/07/2018 0113 ondansetron (ZOFRAN) injection 4 mg 4 mg Intravenous Given     08/07/2018 0113 fentanyl citrate (PF) 100 MCG/2ML 50 mcg 50 mcg Intravenous Given     08/07/2018 0114 sodium chloride 0 9 % bolus 1,000 mL 1,000 mL Intravenous New Bag     08/07/2018 0147 fentanyl citrate (PF) 100 MCG/2ML 50 mcg 50 mcg Intravenous Given     08/07/2018 0154 sodium chloride 0 9 % bolus 1,000 mL 1,000 mL Intravenous New Bag     08/07/2018 0255 sodium chloride 0 9 % bolus 1,000 mL 1,000 mL Intravenous New Bag     08/07/2018 0338 albumin human (FLEXBUMIN) 5 % injection 12 5 g 12 5 g Intravenous New Bag     08/07/2018 0338 fentanyl citrate (PF) 100 MCG/2ML 75 mcg 75 mcg Intravenous Given     08/07/2018 0450 albumin human (FLEXBUMIN) 5 % injection 12 5 g 12 5 g Intravenous New Bag     08/07/2018 0424 sodium chloride 0 9 % infusion 200 mL/hr Intravenous New Bag     08/07/2018 0434 oxyCODONE (ROXICODONE) immediate release tablet 10 mg 10 mg Oral Given       Past Medical/Surgical History:   Diagnosis    Anxiety    Asthma    Cardiac disease    Chest pain    Colitis    Colon polyps    COPD (chronic obstructive pulmonary disease) (HCC)    Coronary artery disease    Diverticulitis    Esophageal reflux    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HCC)    Hyperlipidemia    Hypertension    IBD (inflammatory bowel disease)    [facility] infarction [facility] [address] [facility]    Old myocardial infarction    Perforation of colon (HCC)    Type 2 diabetes, diet controlled ([facility] [address] )    Ulcerative colitis ([facility] [address] )- Crohn's        Admitting Diagnosis: Hypovolemic shock ([address] ) [R57 1]  Leukocytosis [D72 829]  Abdominal pain [R10 9]  Anemia [D64 9]  Hypotension [I95 9]  DEBBY (acute kidney injury) ([facility] [address] ) [N17 9]    Age/Sex: 66 y o  male    Assessment/Plan:   Abdominal pain with ileostomy in place  · Patient is a this pain started yesterday and became progressively worse  Denies any change in ostomy output  Abdominal pain possibly secondary to Crohn's disease versus abscess  · CT abd- inflamm changes in abdomen and pelvis with small fluid collections unchaged  · WBC 18 98  · PRN pain meds  · C diff pending  · Blood cx x2  · Urine culture pending  · General surgery following  · Monitor ostomy output      Acute kidney injury  · Received 4 L fluid bolus in the ED  · IV fluids 200 cc/hour  · Creatinine 3 43 (baseline 0 8)  · Continue to monitor     Hypotension  · Patient received 4 L fluid bolus in ED along with albumin 12 5g x3; patient's BP continues to be soft  · 1L fluid bolus once on floor   · 100mg hydrocortisone for possible adrenal insufficiency   · Hold BP medication      Crohn disease  · Possible flare up   · CT abdomen-inflammatory changes in abdomen and pelvis  · Mesalamine mean 500 mg t i d      COPD  · Patient states he has oxygen at home  States he only uses oxygen when he feels it is necessary  Home O2 2L when needed  · Albuterol PRN     Alcohol abuse  · Patient denies any alcohol use in the last 5 months  · Monitor for signs of alcohol withdrawal      Anemia  · No signs of active bleed  · Hemoglobin 9 8  · Continue to monitor     Hypertension  · Hold blood pressure meds due to hypotension     Hyperlipidemia  · Stable      Nicotine dependence  · Nicotine patch     Ambulatory dysfunction  · Patient states he uses walker to get around at home      Admission Orders:  Scheduled Meds:   Current Facility-Administered Medications:  acetaminophen 650 mg Oral Q6H PRN    albuterol 2 puff Inhalation TID    albuterol 2 puff Inhalation Q4H PRN    albuterol 2 5 mg Nebulization Q6H PRN    aspirin 81 mg Oral Daily    atorvastatin 40 mg Oral Daily With Sandra Aguirre fentanyl citrate (PF) 50 mcg Intravenous D9T PRN    folic acid 1 mg Oral Daily    heparin (porcine) 5,000 Units Subcutaneous Q8H JUVENTINO    mesalamine 500 mg Oral TID    nicotine 1 patch Transdermal Daily    nystatin  Topical BID    oxyCODONE 10 mg Oral Q4H PRN    oxyCODONE 5 mg Oral Q4H PRN    thiamine 100 mg Oral Daily    tiotropium 18 mcg Inhalation Daily    vitamin A 30,000 Units Oral Daily      Continuous Infusions:   sodium chloride 200 mL/hr      Nursing orders - VS q 4 - I & O q shift - SCD's to le's- Up with assistance -Diet regular House     Colorectal Surgery - Assessment/Plan:  66 y o  yo male with hypotension and DEBBY secondary to dehydration from high output ileostomy      Plan  - admit to [facility]              - DEBBY - rehydrate              - Recommend checking UA based on history of treatment for UTI in 7/2018     - Diet as tolerated  - Monitor ileostomy output  - Will consider ileostomy reversal once nutritional status improves and acute illness resolves  - Previous abdominal abscess drained, monitor WBC  - pain control p r n

## 2018-08-07 NOTE — Clinical Note
Case was discussed with Critical Care and the patient's admission status was agreed to be Admission Status: inpatient status to the service of Dr Talat Vidal

## 2018-08-07 NOTE — ED ATTENDING ATTESTATION
Liza Dancer, MD, saw and evaluated the patient  I have discussed the patient with the resident/non-physician practitioner and agree with the resident's/non-physician practitioner's findings, Plan of Care, and MDM as documented in the resident's/non-physician practitioner's note, except where noted  All available labs and Radiology studies were reviewed  At this point I agree with the current assessment done in the Emergency Department  I have conducted an independent evaluation of this patient including a focused history of:    Emergency Department Note- Cristine eBckman 66 y o  male MRN: [id number]    Unit/Bed#: ED 14 Encounter: [id number]    Cristine Beckman is a 66 y o  male who presents with   Chief Complaint   Patient presents with    Abdominal Pain     pt had an ileostomy placed about 3 weeks ago  c/o abdominal pain and nausea all day  denies fevers         History of Present Illness   HPI:  Cristine Beckman is a 66 y o  male who presents for evaluation of:  Nausea, bloating, abdominal pain primarily around colostomy site  Patient is scheduled to follow up with Dr Jayda Villarreal later today  Patient had ileostomy placed 3 weeks ago for IBD  Review of Systems   Constitutional: Positive for fatigue  Negative for fever  Gastrointestinal: Positive for abdominal pain and nausea  Negative for vomiting  All other systems reviewed and are negative        Historical Information   Past Medical History:   Diagnosis Date    Anxiety     Asthma     Cardiac disease     Chest pain     Colitis     Colon polyps     COPD (chronic obstructive pulmonary disease) (HCC)     Coronary artery disease     Diverticulitis     Esophageal reflux     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HCC)     Hyperlipidemia     Hypertension     IBD (inflammatory bowel disease)     [facility] [address] )     Old myocardial infarction     Perforation of colon (HCC)     Type 2 diabetes, diet controlled ([facility] [address] )     Ulcerative colitis [facility])      Past Surgical History:   Procedure Laterality Date    ANGIOPLASTY      COLON SURGERY      COLONOSCOPY N/A 10/24/2016    Procedure: COLONOSCOPY;  Surgeon: Dieudonne Ding MD;  [facility]: [facility]; Service:     CORONARY ANGIOPLASTY WITH STENT PLACEMENT      ESOPHAGOGASTRODUODENOSCOPY N/A 10/24/2016    Procedure: ESOPHAGOGASTRODUODENOSCOPY (EGD); Surgeon: Dieudonne Ding MD;  [facility]: [facility]; Service:     EXPLORATORY LAPAROTOMY W/ BOWEL RESECTION N/A 5/22/2018    Procedure: EXPLORATORY LAPAROTOMY, ILIOCOLECTOMY, ILIOCOLONIC ANASTAMOSIS, LOOP ILIOSTOMY, REPAIR OF SEROSAL TEAR, EXTENSIVE LYSIS OF ADHESIONS, WOUND VAC PLACEMENT;  Surgeon: Dieudonne Ding MD;  [facility];  Service: Colorectal    HEMICOLECTOMY      OTHER SURGICAL HISTORY      stent indications acute myocardial infarction    [place] COLONOSCOPY FLX DX W/COLLJ SPEC WHEN PFRMD N/A 2/6/2018    Procedure: COLONOSCOPY;  Surgeon: Dieudonne Ding MD;  [facility];   Service: Colorectal    TONSILLECTOMY       Social History   History   Alcohol Use No     Comment: pt "quit about 4 months ago"     History   Drug Use No     History   Smoking Status    Current Every Day Smoker    Packs/day: 0 00    Years: 5 00    Types: Cigars   Smokeless Tobacco    Never Used     Comment: 3 cigars per day     Family History: non-contributory    Meds/Allergies   all medications and allergies reviewed  Allergies   Allergen Reactions    Medical Tape     Other      Brown cloth band aids       Latex Rash       Objective   First Vitals:   Blood Pressure: (!) 78/54 (08/07/18 0029)  Temperature: (!) 97 4 °F (36 3 °C) (08/07/18 0029)  Temp Source: Oral (08/07/18 0029)  Respirations: 18 (08/07/18 0029)  Weight - Scale: 61 2 kg (135 lb) (08/07/18 0029)  SpO2: 92 % (08/07/18 0029)    Current Vitals:   Blood Pressure: (!) 78/54 (08/07/18 0029)  Temperature: (!) 97 4 °F (36 3 °C) (08/07/18 0029)  Temp Source: Oral (08/07/18 0130)  Respirations: 18 (18)  Weight - Scale: 61 2 kg (135 lb) (18)  SpO2: 92 % (18)    No intake or output data in the 24 hours ending 18 0105    Invasive Devices     Drain            Colostomy  RUQ 76 days                Physical Exam   Constitutional: He is oriented to person, place, and time  He appears well-developed  No distress  HENT:   Head: Normocephalic and atraumatic  Cardiovascular: Normal rate and regular rhythm  Pulmonary/Chest: Effort normal and breath sounds normal    Abdominal: He exhibits no distension  There is tenderness  Musculoskeletal: Normal range of motion  He exhibits no deformity  Neurological: He is alert and oriented to person, place, and time  Skin: Skin is warm and dry  Psychiatric: He has a normal mood and affect  His behavior is normal  Judgment and thought content normal    Nursing note and vitals reviewed  Medical Decision Makin   Acute abdominal pain: CTAP to assess abdominal pain; CBC r/o leukocytosis; CMP r/o renal failure    Recent Results (from the past 36 hour(s))   Basic metabolic panel    Collection Time: 18  5:20 PM   Result Value Ref Range    Sodium 130 (L) 136 - 145 mmol/L    Potassium 4 8 3 5 - 5 3 mmol/L    Chloride 98 (L) 100 - 108 mmol/L    CO2 21 21 - 32 mmol/L    Anion Gap 11 4 - 13 mmol/L    BUN 43 (H) 5 - 25 mg/dL    Creatinine 2 42 (H) 0 60 - 1 30 mg/dL    Glucose 84 65 - 140 mg/dL    Calcium 9 1 8 3 - 10 1 mg/dL    eGFR 29 ml/min/1 73sq m   CBC and differential    Collection Time: 18  5:20 PM   Result Value Ref Range    WBC 12 53 (H) 4 31 - 10 16 Thousand/uL    RBC 3 34 (L) 3 88 - 5 62 Million/uL    Hemoglobin 9 3 (L) 12 0 - 17 0 g/dL    Hematocrit 28 9 (L) 36 5 - 49 3 %    MCV 87 82 - 98 fL    MCH 27 8 26 8 - 34 3 pg    MCHC 32 2 31 4 - 37 4 g/dL    RDW 15 9 (H) 11 6 - 15 1 %    MPV 10 6 8 9 - 12 7 fL    Platelets 572 (H) 511 - 390 Thousands/uL    nRBC 0 /100 WBCs    Neutrophils Relative 74 43 - 75 %    Immat GRANS % 1 0 - 2 %    Lymphocytes Relative 16 14 - 44 %    Monocytes Relative 8 4 - 12 %    Eosinophils Relative 1 0 - 6 %    Basophils Relative 0 0 - 1 %    Neutrophils Absolute 9 21 (H) 1 85 - 7 62 Thousands/µL    Immature Grans Absolute 0 10 0 00 - 0 20 Thousand/uL    Lymphocytes Absolute 2 05 0 60 - 4 47 Thousands/µL    Monocytes Absolute 1 00 0 17 - 1 22 Thousand/µL    Eosinophils Absolute 0 15 0 00 - 0 61 Thousand/µL    Basophils Absolute 0 02 0 00 - 0 10 Thousands/µL   CBC and differential    Collection Time: 08/07/18 12:42 AM   Result Value Ref Range    WBC 18 98 (H) 4 31 - 10 16 Thousand/uL    RBC 3 55 (L) 3 88 - 5 62 Million/uL    Hemoglobin 9 8 (L) 12 0 - 17 0 g/dL    Hematocrit 30 7 (L) 36 5 - 49 3 %    MCV 87 82 - 98 fL    MCH 27 6 26 8 - 34 3 pg    MCHC 31 9 31 4 - 37 4 g/dL    RDW 15 8 (H) 11 6 - 15 1 %    MPV 10 4 8 9 - 12 7 fL    Platelets 929 (H) 503 - 390 Thousands/uL    nRBC 0 /100 WBCs    Neutrophils Relative 78 (H) 43 - 75 %    Immat GRANS % 1 0 - 2 %    Lymphocytes Relative 13 (L) 14 - 44 %    Monocytes Relative 8 4 - 12 %    Eosinophils Relative 0 0 - 6 %    Basophils Relative 0 0 - 1 %    Neutrophils Absolute 14 81 (H) 1 85 - 7 62 Thousands/µL    Immature Grans Absolute 0 15 0 00 - 0 20 Thousand/uL    Lymphocytes Absolute 2 40 0 60 - 4 47 Thousands/µL    Monocytes Absolute 1 52 (H) 0 17 - 1 22 Thousand/µL    Eosinophils Absolute 0 07 0 00 - 0 61 Thousand/µL    Basophils Absolute 0 03 0 00 - 0 10 Thousands/µL     XR chest 2 views    (Results Pending)   CT abdomen pelvis wo contrast    (Results Pending)         Portions of the record may have been created with voice recognition software  Occasional wrong word or "sound a like" substitutions may have occurred due to the inherent limitations of voice recognition software  Read the chart carefully and recognize, using context, where substitutions have occurred

## 2018-08-07 NOTE — PROGRESS NOTES
[facility] Admission Note   SOD Team A          Kevin Gonzalez 66 y o  male [id number]       Patient seen and examined  Reviewed H&P per Dr Jordan Keepers  Agree with the assessment and plan except other wise stated  82-year-old male with past medical history of Crohn's disease status post right hemicolectomy complicated by intra-abdominal abscess secondary to Crohn's disease status post IR drain on 06/26/2008, alcohol use and dependence  Patient was recently discharged from the hospital  being treated for hypotension and acute renal failure  Patient was recently ICU on Levophed drip and put on stress dose steroids  Patient had negative workup including C diff, CT of the abdomen and pelvis and discharge from the hospital with repeat laboratory examination  Patient presents with 1 day acute sudden onset of abdominal pain associated with nausea but no vomiting and decreased appetite  On physical exam patient had moist mucous membranes, AO3, normal rate and rhythm, hypotensive, expiratory wheezes throughout lung fields, abdomen tender on palpation, non-distended, osteomy full of fluid, no edema, neurological intact and mentating well  Assessment/Plan: Principal Problem:    Abdominal pain  Active Problems:    Crohn disease ([facility] [address] )    Hypertension    Leukocytosis    Asthma    Atherosclerosis of coronary artery    COPD (chronic obstructive pulmonary disease) ([facility])    Dyslipidemia    Alcohol abuse    Cigarette nicotine dependence without complication    Ileostomy in place [facility])    Anemia    Hypotension    DEBBY (acute kidney injury) ([facility] [address] )    Ambulatory dysfunction       Abdominal Pain  · Suspect noncompliance with colostomy vs intrabdominal processess  · History of prior hemicolectomy in 2005, Intrabdominal abscess s/p IR tube  removal on Thursday 8/2/2018  · Leukocytosis, afebrile and hypotensive  No lactic acidosis     · CT abdomen and pelvis the emergency department showed inflammatory changes in the abdomen pelvis with some intra-abdominal fluid however unchanged from prior studies  · 8/2-Patient recently had IR removal of SANTA drained and resolution of the abscess  · Monitor ileostomy output  · C diff pending ileostomy fluid  Contact precautions until C diff is rule  · Blood cultures x2 pending  · Continue intravenous normal saline at 200 cc an hour  · Monitor off antibiotics at this time  · BMP, CBC in am  · Oxy and fentanyl for pain  Avoid Dilaudid risk of further exacerbating hypotension   · Colorectal notified  Considering reversing ileostomy in the future  Hypotension  · Receives 4 L of normal saline in the emergency department, 325% albumin  · Blood pressure is ranging 70 systolic to 80 systolics   · Will order this time 100 mg of hydrocortisone  · Patient is mentating adequately  · Continue monitoring hemodynamics  · Continue step-down level 2      Disposition:  INPATIENT     Expected LOS: ARGENIS Arriaza    Internal Medicine PGY-2  8/7/2018 6:14 AM

## 2018-08-07 NOTE — PROGRESS NOTES
Solu Cortef administered  Patient is asymptomatic    He is asking for pain pills and to be able to eat breakfast

## 2018-08-07 NOTE — CONSULTS
Consultation - Colorectal Surgery  Nicol Coker 66 y o  male MRN: [id number]  Unit/Bed#: ED 14 Encounter: [id number]    Assessment/Plan     Assessment/Plan:  66 y o  yo male with hypotension and DEBBY secondary to dehydration from high output ileostomy  Plan  - admit to Medical Critical Care   - DEBBY - rehydrate   - Recommend checking UA based on history of treatment for UTI in 7/2018    - Diet as tolerated  - Monitor ileostomy output  - Will consider ileostomy reversal once nutritional status improves and acute illness resolves  - Previous abdominal abscess drained, monitor WBC  - pain control p r n  No new Assessment & Plan notes have been filed under this hospital service since the last note was generated  Service: Colorectal      History of Present Illness     HPI:  Nicol Coker is a 66 y o  male who presents with hypotension, abdominal pain, and nausea for the past day  Patient's past medical history is significant for IBD with right hemicolectomy, perforated transverse colon, status post lysis of adhesions, ileal colon resection with diverting ileostomy in May of 2018  Patient was discharged from [facility] on 07/18/2018  Since discharge patient claims his ileostomy outputs out 3-4 ostomy bags worth of liquid stool per day  Patient states that he is replenish in with 3-4 bottles of water  Patient's pain prior to presentation began the day before on the morning of 08/06/2018  Patient states he has had nausea with no vomiting, he denies fevers and chills, denies chest pain but claims he had shortness of breath which is normal due to his COPD  Patient states his pain is all over his abdomen with the only relieving factor being tramadol which helps him sleep  Upon presenting today patient's blood pressures have been in mid 70s to mid 99H systolic  He has received 3 L bolus of normal saline and has been to a transient responder    Patient mentate well with lower blood pressures  This instance is the 3rd time that the patient has presented with nausea and abdominal pain and hypotension along with being in acute renal failure in the ED since initial surgery in May of 2018  Inpatient consult to Colorectal Surgery     Performed by  STEVE Grimes     Authorized by Shena Moore             [facility], colorectal surgery    Review of Systems   Review of Systems - General ROS: negative for - chills or fever  Psychological ROS: negative  Hematological and Lymphatic ROS: negative for - bleeding problems  Endocrine ROS: negative for - temperature intolerance  Respiratory ROS: positive for - shortness of breath however shortness of breath is normal for patient  negative for - sputum changes  Cardiovascular ROS: negative for - chest pain  Gastrointestinal ROS: positive for - abdominal pain and nausea  Genito-Urinary ROS: positive for - oliguria  Musculoskeletal ROS: negative  Neurological ROS: negative    Historical Information   Past Medical History:   Diagnosis Date    Anxiety     Asthma     Cardiac disease     Chest pain     Colitis     Colon polyps     [facility]     Coronary artery disease     Diverticulitis     Esophageal reflux     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 )     Hyperlipidemia     Hypertension     IBD (inflammatory bowel disease)     [facility] infarction [facility] )     Old myocardial infarction     Perforation of colon ([facility] )     Type 2 diabetes, diet controlled ([facility] )     Ulcerative colitis ([facility] )      Past Surgical History:   Procedure Laterality Date    ANGIOPLASTY      COLON SURGERY      COLONOSCOPY N/A 10/24/2016    Procedure: COLONOSCOPY;  Surgeon: Dimple Quevedo MD;  Location: [facility]; Service:     CORONARY ANGIOPLASTY WITH STENT PLACEMENT      ESOPHAGOGASTRODUODENOSCOPY N/A 10/24/2016    Procedure: ESOPHAGOGASTRODUODENOSCOPY (EGD);   Surgeon: Dimple Quevedo MD;  [facility]: [facility]; Service:     EXPLORATORY LAPAROTOMY W/ BOWEL RESECTION N/A 5/22/2018    Procedure: EXPLORATORY LAPAROTOMY, ILIOCOLECTOMY, ILIOCOLONIC ANASTAMOSIS, LOOP ILIOSTOMY, REPAIR OF SEROSAL TEAR, EXTENSIVE LYSIS OF ADHESIONS, WOUND VAC PLACEMENT;  Surgeon: Zeeshan Veras MD;  Location: [facility];  Service: Colorectal    HEMICOLECTOMY      OTHER SURGICAL HISTORY      stent indications acute myocardial infarction    CO COLONOSCOPY FLX DX W/COLLJ SPEC WHEN PFRMD N/A 2/6/2018    Procedure: COLONOSCOPY;  Surgeon: Zeeshan Veras MD;  [facility]: [facility];   Service: Colorectal    TONSILLECTOMY       Social History   History   Alcohol Use No     Comment: pt "quit about 4 months ago"     History   Drug Use No     History   Smoking Status    Current Every Day Smoker    Packs/day: 0 00    Years: 5 00    Types: Cigars   Smokeless Tobacco    Never Used     Comment: 3 cigars per day         Meds/Allergies   all current active meds have been reviewed  Allergies   Allergen Reactions    Medical Tape     Other      Brown cloth band aids       Latex Rash       Objective   First Vitals:   Blood Pressure: (!) 78/54 (08/07/18 0029)  Pulse: 94 (08/07/18 0115)  Temperature: (!) 97 4 °F (36 3 °C) (08/07/18 0029)  Temp Source: Oral (08/07/18 0029)  Respirations: 18 (08/07/18 0029)  Weight - Scale: 61 2 kg (135 lb) (08/07/18 0029)  SpO2: 92 % (08/07/18 0029)    Current Vitals:   Blood Pressure: 92/54 (08/07/18 0215)  Pulse: 90 (08/07/18 0215)  Temperature: (!) 97 4 °F (36 3 °C) (08/07/18 0029)  Temp Source: Oral (08/07/18 0029)  Respirations: 20 (08/07/18 0215)  Weight - Scale: 61 2 kg (135 lb) (08/07/18 0029)  SpO2: 97 % (08/07/18 0215)      Intake/Output Summary (Last 24 hours) at 08/07/18 0246  Last data filed at 08/07/18 0154   Gross per 24 hour   Intake             2000 ml   Output                0 ml   Net             2000 ml       Invasive Devices     Peripheral Intravenous Line            Peripheral IV 08/07/18 Left Forearm less than 1 day    Peripheral IV 08/07/18 Right Antecubital less than 1 day          Drain            Colostomy  RUQ 76 days                Physical Exam   Physical Exam: General: AAOx3  Respiratory: BS b/l  Abdomen: Soft, non specific diffuse tenderness, however no guarding  Ileostomy - RLQ - liquid green stool output with small flecks  Midline abdominal wound - dressing c/d/i, healing appropriately  Heart: RRR, S1s2  Ext: Warm no cyanosis     Lab Results:   I have personally reviewed pertinent lab results  , CBC:   Lab Results   Component Value Date    WBC 18 98 (H) 08/07/2018    HGB 9 8 (L) 08/07/2018    HCT 30 7 (L) 08/07/2018    MCV 87 08/07/2018     (H) 08/07/2018    MCH 27 6 08/07/2018    MCHC 31 9 08/07/2018    RDW 15 8 (H) 08/07/2018    MPV 10 4 08/07/2018    NRBC 0 08/07/2018   , CMP:   Lab Results   Component Value Date     (L) 08/07/2018    K 4 1 08/07/2018    CL 99 (L) 08/07/2018    CO2 21 08/07/2018    ANIONGAP 12 08/07/2018    BUN 47 (H) 08/07/2018    CREATININE 3 43 (H) 08/07/2018    GLUCOSE 118 08/07/2018    CALCIUM 9 6 08/07/2018    AST 13 08/07/2018    ALT 22 08/07/2018    ALKPHOS 132 (H) 08/07/2018    PROT 8 6 (H) 08/07/2018    BILITOT 0 19 (L) 08/07/2018    EGFR 19 08/07/2018     Imaging: I have personally reviewed pertinent reports  and I have personally reviewed pertinent films in PACS   CT A/P w/o contrast 8/7/18 IMPRESSION:     Inflammatory changes in the abdomen and pelvis with small fluid collections unchanged from prior study  EKG, Pathology, and Other Studies: I have personally reviewed pertinent reports  Counseling / Coordination of Care  Total floor / unit time spent today 30 minutes  Greater than 50% of total time was spent with the patient and / or family counseling and / or coordination of care

## 2018-08-07 NOTE — PROGRESS NOTES
Asked to see this patient in ED for possible critical care admission  He is relatively hypotensive- fluid responsive in setting of dehydration from high ileostomy output  He has DEBBY  He is non-toxic appearing and conversant even with SBP in 70s  He is awake , alert in no distress  He has mild abdominal tenderness but no rebound or guarding  His extremities are warm and well perfused  His SBP is now in 80's- getting albumin 5%  I discussed with ED staff as well as medical service  I think he needs just fluid resuscitation  He can get that on SD level 2  I have asked them to call me with any concerns  For completeness, he should probably get a c diff assay given recent antibiotic use  An am cortisol will also be sent

## 2018-08-07 NOTE — RESPIRATORY THERAPY NOTE
RT Protocol Note  Madelin Matta 66 y o  male MRN: [id number]  Unit/Bed#: [facility] 602-01 Encounter: [id number]    Assessment    Principal Problem:    Abdominal pain  Active Problems:    Crohn disease ([place] [address] )    Hypertension    Leukocytosis    Asthma    Atherosclerosis of coronary artery    COPD (chronic obstructive pulmonary disease) ([facility])    Dyslipidemia    Alcohol abuse    Cigarette nicotine dependence without complication    Ileostomy in place ([place] [address] )    Anemia    Hypotension    DEBBY (acute kidney injury) ([place] [address] )    Ambulatory dysfunction      Home Pulmonary Medications:  Albuterol 0 083 PRN  Spriva and ventolin tid Home Devices/Therapy: (P) Home O2 (PRN as needed )    Past Medical History:   Diagnosis Date    Anxiety     Asthma     Cardiac disease     Chest pain     Colitis     Colon polyps     COPD (chronic obstructive pulmonary disease) ([facility])     Coronary artery disease     Diverticulitis     Esophageal reflux     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[facility])     Hyperlipidemia     Hypertension     IBD (inflammatory bowel disease)     Myocardial infarction ([place] [address] )     Old myocardial infarction     Perforation of colon ([facility])     Type 2 diabetes, diet controlled ([place] [address] )     Ulcerative colitis ([place] [address] )      Social History     Social History    Marital status:       Spouse name: N/A    Number of children: N/A    Years of education: N/A     Social History Main Topics    Smoking status: Current Every Day Smoker     Packs/day: 0 00     Years: 5 00     Types: Cigars    Smokeless tobacco: Never Used      Comment: 3 cigars per day    Alcohol use No      Comment: pt "quit about 4 months ago"    Drug use: No    Sexual activity: Yes     Partners: Female     Birth control/ protection: None     Other Topics Concern    None     Social History Narrative    None       Subjective         Objective    Physical Exam:   Assessment Type: (P) Assess only  General Appearance: (P) Awake, Alert  Respiratory Pattern: (P) Normal  Chest Assessment: (P) Chest expansion symmetrical  Bilateral Breath Sounds: (P) Diminished    Vitals:  Blood pressure (!) 77/49, pulse 81, temperature 97 7 °F (36 5 °C), resp  rate 20, weight 61 2 kg (135 lb), SpO2 96 %  Imaging and other studies: I have personally reviewed pertinent reports  Plan    Respiratory Plan: (P) Home Bronchodilator Patient pathway        Resp Comments: (P) Pt has a hx of COPD and asthma  Pt presented with abdominal pain and has chrons disease  Pt also presented with a low BP in the 70s  Pt has no pulmonary symptoms at this time and uses Albuterol MDI TID, Spriva daily and PRN UDN for SOB/wheezing with no relief with MDI  Lungs are diminished at this time and pt is on RA  PRN UDN not needed, will change prn mdi to TID

## 2018-08-07 NOTE — SOCIAL WORK
Patient identified as HRR per criteria  Call made to DC appointment hotline with information as required for CM support follow up  OP CM referral made

## 2018-08-07 NOTE — PROGRESS NOTES
Patient with extremely low BP:    Blood pressure (!) 71/42, pulse 85, temperature 97 52 °F (36 4 °C), temperature source Oral, resp  rate (!) 10, weight 61 2 kg (135 lb), SpO2 91 %  BP did not change after bag of albumin  SOD notified

## 2018-08-08 LAB
ANION GAP SERPL CALCULATED.3IONS-SCNC: 8 MMOL/L (ref 4–13)
BACTERIA UR CULT: ABNORMAL
BACTERIA UR CULT: ABNORMAL
BASOPHILS # BLD AUTO: 0.02 THOUSANDS/ΜL (ref 0–0.1)
BASOPHILS NFR BLD AUTO: 0 % (ref 0–1)
BUN SERPL-MCNC: 28 MG/DL (ref 5–25)
CALCIUM SERPL-MCNC: 7.6 MG/DL (ref 8.3–10.1)
CHLORIDE SERPL-SCNC: 120 MMOL/L (ref 100–108)
CO2 SERPL-SCNC: 18 MMOL/L (ref 21–32)
CORTIS SERPL-MCNC: 21.5 UG/DL
CORTIS SERPL-MCNC: 25.5 UG/DL
CORTIS SERPL-MCNC: 4.9 UG/DL
CORTIS SERPL-MCNC: 7.7 UG/DL
CREAT SERPL-MCNC: 1.36 MG/DL (ref 0.6–1.3)
EOSINOPHIL # BLD AUTO: 0.09 THOUSAND/ΜL (ref 0–0.61)
EOSINOPHIL NFR BLD AUTO: 1 % (ref 0–6)
ERYTHROCYTE [DISTWIDTH] IN BLOOD BY AUTOMATED COUNT: 15.9 % (ref 11.6–15.1)
GFR SERPL CREATININE-BSD FRML MDRD: 58 ML/MIN/1.73SQ M
GLUCOSE SERPL-MCNC: 91 MG/DL (ref 65–140)
HCT VFR BLD AUTO: 24.2 % (ref 36.5–49.3)
HGB BLD-MCNC: 7.1 G/DL (ref 12–17)
IMM GRANULOCYTES # BLD AUTO: 0.06 THOUSAND/UL (ref 0–0.2)
IMM GRANULOCYTES NFR BLD AUTO: 1 % (ref 0–2)
LYMPHOCYTES # BLD AUTO: 2.45 THOUSANDS/ΜL (ref 0.6–4.47)
LYMPHOCYTES NFR BLD AUTO: 25 % (ref 14–44)
MCH RBC QN AUTO: 27.4 PG (ref 26.8–34.3)
MCHC RBC AUTO-ENTMCNC: 29.3 G/DL (ref 31.4–37.4)
MCV RBC AUTO: 93 FL (ref 82–98)
MONOCYTES # BLD AUTO: 0.9 THOUSAND/ΜL (ref 0.17–1.22)
MONOCYTES NFR BLD AUTO: 9 % (ref 4–12)
NEUTROPHILS # BLD AUTO: 6.4 THOUSANDS/ΜL (ref 1.85–7.62)
NEUTS SEG NFR BLD AUTO: 64 % (ref 43–75)
NRBC BLD AUTO-RTO: 0 /100 WBCS
PLATELET # BLD AUTO: 292 THOUSANDS/UL (ref 149–390)
PMV BLD AUTO: 10.3 FL (ref 8.9–12.7)
POTASSIUM SERPL-SCNC: 4.2 MMOL/L (ref 3.5–5.3)
RBC # BLD AUTO: 2.59 MILLION/UL (ref 3.88–5.62)
SODIUM SERPL-SCNC: 146 MMOL/L (ref 136–145)
WBC # BLD AUTO: 9.92 THOUSAND/UL (ref 4.31–10.16)

## 2018-08-08 PROCEDURE — 82533 TOTAL CORTISOL: CPT | Performed by: INTERNAL MEDICINE

## 2018-08-08 PROCEDURE — 99232 SBSQ HOSP IP/OBS MODERATE 35: CPT | Performed by: INTERNAL MEDICINE

## 2018-08-08 PROCEDURE — 82024 ASSAY OF ACTH: CPT | Performed by: INTERNAL MEDICINE

## 2018-08-08 PROCEDURE — 80048 BASIC METABOLIC PNL TOTAL CA: CPT | Performed by: INTERNAL MEDICINE

## 2018-08-08 PROCEDURE — 85025 COMPLETE CBC W/AUTO DIFF WBC: CPT | Performed by: INTERNAL MEDICINE

## 2018-08-08 PROCEDURE — 94760 N-INVAS EAR/PLS OXIMETRY 1: CPT

## 2018-08-08 RX ORDER — AMIODARONE HYDROCHLORIDE 200 MG/1
200 TABLET ORAL
Status: DISCONTINUED | OUTPATIENT
Start: 2018-08-08 | End: 2018-08-09 | Stop reason: HOSPADM

## 2018-08-08 RX ORDER — COSYNTROPIN 0.25 MG/ML
0.25 INJECTION, POWDER, FOR SOLUTION INTRAMUSCULAR; INTRAVENOUS ONCE
Status: COMPLETED | OUTPATIENT
Start: 2018-08-08 | End: 2018-08-08

## 2018-08-08 RX ORDER — SODIUM CHLORIDE, SODIUM GLUCONATE, SODIUM ACETATE, POTASSIUM CHLORIDE, MAGNESIUM CHLORIDE, SODIUM PHOSPHATE, DIBASIC, AND POTASSIUM PHOSPHATE .53; .5; .37; .037; .03; .012; .00082 G/100ML; G/100ML; G/100ML; G/100ML; G/100ML; G/100ML; G/100ML
150 INJECTION, SOLUTION INTRAVENOUS CONTINUOUS
Status: DISCONTINUED | OUTPATIENT
Start: 2018-08-08 | End: 2018-08-09 | Stop reason: HOSPADM

## 2018-08-08 RX ORDER — AMIODARONE HYDROCHLORIDE 200 MG/1
200 TABLET ORAL
Status: DISCONTINUED | OUTPATIENT
Start: 2018-08-09 | End: 2018-08-08

## 2018-08-08 RX ORDER — TRAMADOL HYDROCHLORIDE 50 MG/1
100 TABLET ORAL EVERY 8 HOURS PRN
Status: DISCONTINUED | OUTPATIENT
Start: 2018-08-08 | End: 2018-08-09 | Stop reason: HOSPADM

## 2018-08-08 RX ORDER — GABAPENTIN 400 MG/1
400 CAPSULE ORAL 3 TIMES DAILY
Status: DISCONTINUED | OUTPATIENT
Start: 2018-08-08 | End: 2018-08-09 | Stop reason: HOSPADM

## 2018-08-08 RX ORDER — OXYCODONE HYDROCHLORIDE 5 MG/1
5 TABLET ORAL EVERY 4 HOURS PRN
Status: DISCONTINUED | OUTPATIENT
Start: 2018-08-08 | End: 2018-08-08

## 2018-08-08 RX ORDER — CIPROFLOXACIN 2 MG/ML
400 INJECTION, SOLUTION INTRAVENOUS EVERY 12 HOURS
Status: DISCONTINUED | OUTPATIENT
Start: 2018-08-08 | End: 2018-08-08

## 2018-08-08 RX ORDER — TRAMADOL HYDROCHLORIDE 50 MG/1
100 TABLET ORAL EVERY 12 HOURS PRN
Status: DISCONTINUED | OUTPATIENT
Start: 2018-08-08 | End: 2018-08-08

## 2018-08-08 RX ORDER — MAGNESIUM HYDROXIDE/ALUMINUM HYDROXICE/SIMETHICONE 120; 1200; 1200 MG/30ML; MG/30ML; MG/30ML
15 SUSPENSION ORAL EVERY 4 HOURS PRN
Status: DISCONTINUED | OUTPATIENT
Start: 2018-08-08 | End: 2018-08-09 | Stop reason: HOSPADM

## 2018-08-08 RX ORDER — LISINOPRIL 2.5 MG/1
2.5 TABLET ORAL DAILY
Status: DISCONTINUED | OUTPATIENT
Start: 2018-08-08 | End: 2018-08-09 | Stop reason: HOSPADM

## 2018-08-08 RX ADMIN — Medication 30000 UNITS: at 09:07

## 2018-08-08 RX ADMIN — OXYCODONE HYDROCHLORIDE 10 MG: 10 TABLET ORAL at 07:02

## 2018-08-08 RX ADMIN — HEPARIN SODIUM 5000 UNITS: 5000 INJECTION, SOLUTION INTRAVENOUS; SUBCUTANEOUS at 13:21

## 2018-08-08 RX ADMIN — AMIODARONE HYDROCHLORIDE 200 MG: 200 TABLET ORAL at 12:13

## 2018-08-08 RX ADMIN — TIOTROPIUM BROMIDE 18 MCG: 18 CAPSULE ORAL; RESPIRATORY (INHALATION) at 09:07

## 2018-08-08 RX ADMIN — TRAMADOL HYDROCHLORIDE 100 MG: 50 TABLET, FILM COATED ORAL at 23:18

## 2018-08-08 RX ADMIN — ATORVASTATIN CALCIUM 40 MG: 40 TABLET, FILM COATED ORAL at 18:06

## 2018-08-08 RX ADMIN — ALBUTEROL SULFATE 2 PUFF: 90 AEROSOL, METERED RESPIRATORY (INHALATION) at 21:22

## 2018-08-08 RX ADMIN — TRAMADOL HYDROCHLORIDE 100 MG: 50 TABLET, FILM COATED ORAL at 15:00

## 2018-08-08 RX ADMIN — OXYCODONE HYDROCHLORIDE 10 MG: 10 TABLET ORAL at 03:01

## 2018-08-08 RX ADMIN — ALUMINUM HYDROXIDE, MAGNESIUM HYDROXIDE, AND SIMETHICONE 15 ML: 200; 200; 20 SUSPENSION ORAL at 09:11

## 2018-08-08 RX ADMIN — ALBUTEROL SULFATE 2 PUFF: 90 AEROSOL, METERED RESPIRATORY (INHALATION) at 15:00

## 2018-08-08 RX ADMIN — SODIUM CHLORIDE, SODIUM GLUCONATE, SODIUM ACETATE, POTASSIUM CHLORIDE, MAGNESIUM CHLORIDE, SODIUM PHOSPHATE, DIBASIC, AND POTASSIUM PHOSPHATE 150 ML/HR: .53; .5; .37; .037; .03; .012; .00082 INJECTION, SOLUTION INTRAVENOUS at 13:21

## 2018-08-08 RX ADMIN — ALBUTEROL SULFATE 2 PUFF: 90 AEROSOL, METERED RESPIRATORY (INHALATION) at 09:08

## 2018-08-08 RX ADMIN — MESALAMINE 500 MG: 250 CAPSULE ORAL at 15:00

## 2018-08-08 RX ADMIN — HEPARIN SODIUM 5000 UNITS: 5000 INJECTION, SOLUTION INTRAVENOUS; SUBCUTANEOUS at 05:26

## 2018-08-08 RX ADMIN — ASPIRIN 81 MG 81 MG: 81 TABLET ORAL at 09:07

## 2018-08-08 RX ADMIN — GABAPENTIN 400 MG: 400 CAPSULE ORAL at 15:00

## 2018-08-08 RX ADMIN — FOLIC ACID 1 MG: 1 TABLET ORAL at 09:07

## 2018-08-08 RX ADMIN — MESALAMINE 500 MG: 250 CAPSULE ORAL at 21:21

## 2018-08-08 RX ADMIN — GABAPENTIN 400 MG: 400 CAPSULE ORAL at 21:22

## 2018-08-08 RX ADMIN — SODIUM CHLORIDE 200 ML/HR: 0.9 INJECTION, SOLUTION INTRAVENOUS at 02:00

## 2018-08-08 RX ADMIN — NYSTATIN: 100000 OINTMENT TOPICAL at 09:25

## 2018-08-08 RX ADMIN — ALBUTEROL SULFATE 2 PUFF: 90 AEROSOL, METERED RESPIRATORY (INHALATION) at 06:27

## 2018-08-08 RX ADMIN — ALUMINUM HYDROXIDE, MAGNESIUM HYDROXIDE, AND SIMETHICONE 15 ML: 200; 200; 20 SUSPENSION ORAL at 05:36

## 2018-08-08 RX ADMIN — METRONIDAZOLE 500 MG: 500 INJECTION, SOLUTION INTRAVENOUS at 18:06

## 2018-08-08 RX ADMIN — GABAPENTIN 400 MG: 400 CAPSULE ORAL at 09:07

## 2018-08-08 RX ADMIN — HEPARIN SODIUM 5000 UNITS: 5000 INJECTION, SOLUTION INTRAVENOUS; SUBCUTANEOUS at 21:22

## 2018-08-08 RX ADMIN — COSYNTROPIN 0.25 MG: 0.25 INJECTION, POWDER, LYOPHILIZED, FOR SOLUTION INTRAMUSCULAR; INTRAVENOUS at 16:04

## 2018-08-08 RX ADMIN — SODIUM CHLORIDE, SODIUM GLUCONATE, SODIUM ACETATE, POTASSIUM CHLORIDE, MAGNESIUM CHLORIDE, SODIUM PHOSPHATE, DIBASIC, AND POTASSIUM PHOSPHATE 150 ML/HR: .53; .5; .37; .037; .03; .012; .00082 INJECTION, SOLUTION INTRAVENOUS at 21:26

## 2018-08-08 RX ADMIN — OXYCODONE HYDROCHLORIDE 5 MG: 5 TABLET ORAL at 12:13

## 2018-08-08 RX ADMIN — METRONIDAZOLE 500 MG: 500 INJECTION, SOLUTION INTRAVENOUS at 03:00

## 2018-08-08 RX ADMIN — CEFTRIAXONE 1000 MG: 1 INJECTION, POWDER, FOR SOLUTION INTRAMUSCULAR; INTRAVENOUS at 12:13

## 2018-08-08 RX ADMIN — NYSTATIN: 100000 OINTMENT TOPICAL at 18:11

## 2018-08-08 RX ADMIN — METRONIDAZOLE 500 MG: 500 INJECTION, SOLUTION INTRAVENOUS at 12:14

## 2018-08-08 RX ADMIN — NICOTINE 1 PATCH: 21 PATCH, EXTENDED RELEASE TRANSDERMAL at 09:16

## 2018-08-08 RX ADMIN — MESALAMINE 500 MG: 250 CAPSULE ORAL at 09:07

## 2018-08-08 RX ADMIN — Medication 100 MG: at 09:07

## 2018-08-08 RX ADMIN — SODIUM CHLORIDE 200 ML/HR: 0.9 INJECTION, SOLUTION INTRAVENOUS at 05:31

## 2018-08-08 NOTE — RESTORATIVE TECHNICIAN NOTE
Restorative Specialist Mobility Note       Activity: Ambulate in room, Bathroom privileges, Dangle, Stand at bedside, Ambulate in gleason (Educated/encouraged pt to ambulate with assistance 3-4 x's/day   Pt callbell, phone/tray within reach )     Assistive Device: Front wheel walker       [employer] BS, Restorative Technician, [employer]

## 2018-08-08 NOTE — PROGRESS NOTES
Progress Note - General Surgery   Dorette Brunner 66 y o  male MRN: [id number]  Unit/Bed#: [facility] 602-01 Encounter: [id number]    Assessment:  67 y/o M p/w hypotension, DEBBY likely 2/2 dehydration from high-output ileostomy site    Plan:  --Monitor ileostomy output  --f/u blood cx  --f/u urine cx  --Pain control  --f/u AM labs    Subjective/Objective     Subjective:     No acute events overnight  This morning, pt c/o b/l lower abdominal pain, which is relieved by his current pain regimen  Pain is most intense near his midline incision site and ostomy site  Tolerating PO  Denies any N/V  +stool in ostomy  Objective:     Blood pressure 91/59, pulse 89, temperature 97 9 °F (36 6 °C), resp  rate 18, height 5' 6" (1 676 m), weight 63 5 kg (139 lb 14 4 oz), SpO2 100 %  ,Body mass index is 22 58 kg/m²  Intake/Output Summary (Last 24 hours) at 08/08/18 0400  Last data filed at 08/08/18 0353   Gross per 24 hour   Intake          6859 99 ml   Output             2890 ml   Net          3969 99 ml       Invasive Devices     Peripheral Intravenous Line            Peripheral IV 08/07/18 Right Antecubital 1 day          Drain            Colostomy  RUQ 77 days                Physical Exam:    GEN: NAD  HEENT: MMM  CV: RRR  Lung: normal effort  Ab: Soft, NT/ND; Ileostomy in RLQ w/ green liquid stool in bag; stoma site appears pink, healthy; midline abdominal wound dressing c/d/i  Extrem: No CCE  Neuro:  A+Ox3, motor and sensation grossly intact      Lab, Imaging and other studies:  CBC:   Lab Results   Component Value Date    WBC 11 09 (H) 08/07/2018    HGB 7 0 (L) 08/07/2018    HCT 22 5 (L) 08/07/2018    MCV 89 08/07/2018     08/07/2018     08/07/2018    MCH 27 8 08/07/2018    MCHC 31 1 (L) 08/07/2018    RDW 15 8 (H) 08/07/2018    MPV 10 2 08/07/2018    MPV 10 2 08/07/2018   , CMP:   Lab Results   Component Value Date     08/07/2018    K 4 0 08/07/2018     (H) 08/07/2018    CO2 19 (L) 08/07/2018 ANIONGAP 8 08/07/2018    BUN 38 (H) 08/07/2018    CREATININE 2 52 (H) 08/07/2018    GLUCOSE 93 08/07/2018    CALCIUM 7 3 (L) 08/07/2018    EGFR 27 08/07/2018   , Coagulation: No results found for: PT, INR, APTT, Urinalysis:   Lab Results   Component Value Date    COLORU Yellow 08/07/2018    CLARITYU Clear 08/07/2018    SPECGRAV <=1 005 08/07/2018    PHUR 5 5 08/07/2018    LEUKOCYTESUR Small (A) 08/07/2018    NITRITE Negative 08/07/2018    PROTEINUA 30 (1+) (A) 08/07/2018    GLUCOSEU Negative 08/07/2018    KETONESU Negative 08/07/2018    BILIRUBINUR Negative 08/07/2018    BLOODU Negative 08/07/2018   , Amylase: No results found for: AMYLASE, Lipase: No results found for: LIPASE  VTE Pharmacologic Prophylaxis: Heparin  VTE Mechanical Prophylaxis: sequential compression device

## 2018-08-08 NOTE — PROGRESS NOTES
IM Residency Progress Note   Unit/Bed#: [facility] 602-01 Encounter: [id number]  SOD Team A      Rena Diaz 66 y o  male [id number]    Hospital Stay Days: 1      Assessment/Plan:    Principal Problem:    Abdominal pain  Active Problems:    [facility] )    Hypertension    Leukocytosis    Asthma    Atherosclerosis of coronary artery    COPD [facility]chronic obstructive [facility] disease) ([facility])    Dyslipidemia    Alcohol abuse    Cigarette nicotine dependence without complication    Ileostomy in place ([facility] 75 )    Anemia    Hypotension    DEBBY (acute kidney injury) ([facility] )    Ambulatory dysfunction    1  Abdominal pain with ileostomy in place    -Patient states that his pain is stable  -Mainly complains regarding pain around his ileostomy site    -No rebound or guarding appreciated on physical exam    -Will change pain meds to tramadol as patient states this has helped him in the past      2  DEBBY w  Acidosis -  Acidosis likely due to fluid resucitation, DEBBY improving      -Will change fluids to isolyte 125/hr  3  Hypotension - Ileostomy output vs  ? Adrenal insuff      -Patient blood pressure improving to 64Q systolic    -Will do cosyntropin stimulation test today  -Morning cortisol was 5, but was obtained at 5:30 a m      4  Crohn Disease      -Will continue to monitor    -Will follow surgery recs  -Inflammatory changes seen on CT will switch cipro to Rocephin  Cont  Flagyl  5  COPD   -Currently stable  6  Anemia - dropped 2 grams since admission but acute drop is likely hemodilutional due to fluid resuscitation  7  HTN - Will start patients lisinopril now that hypotension has improved  8  HLD - Stable  9  Nicotine dependence   -Nicotine patch  Disposition: Cont  In patient  Subjective: The patient states that overall his pain is stable and localizes it around his ileostomy site  He denies any fever or chills  No acute overnight events per nursing  No reported fever overnight  Patient denies any dizziness or AMS  Patient denies any chest pain or shortness of breath  Patient denies any other signs or symptoms  Vitals: Temp (24hrs), Av 9 °F (36 6 °C), Min:97 7 °F (36 5 °C), Max:98 1 °F (36 7 °C)  Current: Temperature: 98 06 °F (36 7 °C)  Vitals:    18 0255 18 0635 18 0824 18 1100   BP: 91/59 98/64  99/64   BP Location:       Pulse: 89 94 99 94   Resp: 18 20  20   Temp: 97 9 °F (36 6 °C) 98 1 °F (36 7 °C)  98 06 °F (36 7 °C)   TempSrc:       SpO2: 100% 98% 97% 96%   Weight:       Height:        Body mass index is 22 58 kg/m²  I/O last 24 hours: In: 8663 3 [P O :580; I V :6156 7; IV Piggyback:1926 7]  Out: 4590 [Urine:2550; Stool:0]      Physical Exam: BP 99/64   Pulse 94   Temp 98 06 °F (36 7 °C)   Resp 20   Ht 5' 6" (1 676 m)   Wt 63 5 kg (139 lb 14 4 oz)   SpO2 96%   BMI 22 58 kg/m²   General appearance: alert and oriented, in no acute distress  Head: Normocephalic, without obvious abnormality, atraumatic  Lungs: clear to auscultation bilaterally  Heart: regular rate and rhythm, S1, S2 normal, no murmur, click, rub or gallop  Abdomen: Ileostomy bag in place  Patient is tender to palpitation especially around ileostomy incisional site  No rebound or guarding  Skin: Skin color, texture, turgor normal  No rashes or lesions  Lymph nodes: No lymphadenopathy appreciated  Neurologic: Grossly normal   Psych: Agitated       Invasive Devices     Peripheral Intravenous Line            Peripheral IV 18 Right Antecubital 1 day          Drain            Colostomy  RUQ 77 days                     Labs:   Recent Results (from the past 24 hour(s))   Cortisol    Collection Time: 18  5:28 AM   Result Value Ref Range    Cortisol, Random 4 9 ug/dL   Basic metabolic panel    Collection Time: 18  5:28 AM   Result Value Ref Range    Sodium 146 (H) 136 - 145 mmol/L    Potassium 4 2 3 5 - 5 3 mmol/L    Chloride 120 (H) 100 - 108 mmol/L    CO2 18 (L) 21 - 32 mmol/L    Anion Gap 8 4 - 13 mmol/L    BUN 28 (H) 5 - 25 mg/dL    Creatinine 1 36 (H) 0 60 - 1 30 mg/dL    Glucose 91 65 - 140 mg/dL    Calcium 7 6 (L) 8 3 - 10 1 mg/dL    eGFR 58 ml/min/1 73sq m   CBC and differential    Collection Time: 08/08/18  5:28 AM   Result Value Ref Range    WBC 9 92 4 31 - 10 16 Thousand/uL    RBC 2 59 (L) 3 88 - 5 62 Million/uL    Hemoglobin 7 1 (L) 12 0 - 17 0 g/dL    Hematocrit 24 2 (L) 36 5 - 49 3 %    MCV 93 82 - 98 fL    MCH 27 4 26 8 - 34 3 pg    MCHC 29 3 (L) 31 4 - 37 4 g/dL    RDW 15 9 (H) 11 6 - 15 1 %    MPV 10 3 8 9 - 12 7 fL    Platelets 003 742 - 174 Thousands/uL    nRBC 0 /100 WBCs    Neutrophils Relative 64 43 - 75 %    Immat GRANS % 1 0 - 2 %    Lymphocytes Relative 25 14 - 44 %    Monocytes Relative 9 4 - 12 %    Eosinophils Relative 1 0 - 6 %    Basophils Relative 0 0 - 1 %    Neutrophils Absolute 6 40 1 85 - 7 62 Thousands/µL    Immature Grans Absolute 0 06 0 00 - 0 20 Thousand/uL    Lymphocytes Absolute 2 45 0 60 - 4 47 Thousands/µL    Monocytes Absolute 0 90 0 17 - 1 22 Thousand/µL    Eosinophils Absolute 0 09 0 00 - 0 61 Thousand/µL    Basophils Absolute 0 02 0 00 - 0 10 Thousands/µL       Radiology Results: I have personally reviewed pertinent reports  Other Diagnostic Testing:   I have personally reviewed pertinent reports          Active Meds:   Current Facility-Administered Medications   Medication Dose Route Frequency    acetaminophen (TYLENOL) tablet 650 mg  650 mg Oral Q6H PRN    albuterol (PROVENTIL HFA,VENTOLIN HFA) inhaler 2 puff  2 puff Inhalation TID    albuterol (PROVENTIL HFA,VENTOLIN HFA) inhaler 2 puff  2 puff Inhalation Q4H PRN    albuterol inhalation solution 2 5 mg  2 5 mg Nebulization Q6H PRN    aluminum-magnesium hydroxide-simethicone (MYLANTA) 200-200-20 mg/5 mL oral suspension 15 mL  15 mL Oral Q4H PRN    amiodarone tablet 200 mg  200 mg Oral Daily With Breakfast    aspirin chewable tablet 81 mg  81 mg Oral Daily  atorvastatin (LIPITOR) tablet 40 mg  40 mg Oral Daily With Dinner    cefTRIAXone (ROCEPHIN) 1,000 mg in dextrose 5 % 50 mL IVPB  1,000 mg Intravenous C46Z    folic acid (FOLVITE) tablet 1 mg  1 mg Oral Daily    gabapentin (NEURONTIN) capsule 400 mg  400 mg Oral TID    heparin (porcine) subcutaneous injection 5,000 Units  5,000 Units Subcutaneous Q8H [facility]    lisinopril (ZESTRIL) tablet 2 5 mg  2 5 mg Oral Daily    mesalamine (PENTASA) ER capsule 500 mg  500 mg Oral TID    metroNIDAZOLE (FLAGYL) IVPB (premix) 500 mg  500 mg Intravenous Q8H    multi-electrolyte (ISOLYTE-S PH 7 4 equivalent) IV solution  150 mL/hr Intravenous Continuous    nicotine (NICODERM CQ) 21 mg/24 hr TD 24 hr patch 1 patch  1 patch Transdermal Daily    nystatin (MYCOSTATIN) ointment   Topical BID    oxyCODONE (ROXICODONE) IR tablet 5 mg  5 mg Oral Q4H PRN    thiamine (VITAMIN B1) tablet 100 mg  100 mg Oral Daily    tiotropium (SPIRIVA) capsule for inhaler 18 mcg  18 mcg Inhalation Daily    vitamin A capsule 30,000 Units  30,000 Units Oral Daily         VTE Pharmacologic Prophylaxis: Heparin  VTE Mechanical Prophylaxis: sequential compression device    Johnathon Parks DO

## 2018-08-08 NOTE — RESTORATIVE TECHNICIAN NOTE
Restorative Specialist Mobility Note       Activity: Other (Comment) (Educated/encouraged pt to ambulate with assistance 3-4 x's/day, pt refused 2* ostomy leaking nursing aware   Pt callbell, phone/tray within reach )      Shantanu GALAN, Restorative Technician, [employer] [facility]

## 2018-08-08 NOTE — PLAN OF CARE
CARDIOVASCULAR - ADULT     Maintains optimal cardiac output and hemodynamic stability Progressing        DISCHARGE PLANNING - CARE MANAGEMENT     Discharge to post-acute care or home with appropriate resources Progressing        GASTROINTESTINAL - ADULT     Maintains or returns to baseline bowel function Progressing        HEMATOLOGIC - ADULT     Maintains hematologic stability Progressing        Knowledge Deficit     Patient/family/caregiver demonstrates understanding of disease process, treatment plan, medications, and discharge instructions Progressing        METABOLIC, FLUID AND ELECTROLYTES - ADULT     Electrolytes maintained within normal limits Progressing     Fluid balance maintained Progressing        PAIN - ADULT     Verbalizes/displays adequate comfort level or baseline comfort level Progressing        Potential for Falls     Patient will remain free of falls Progressing        RESPIRATORY - ADULT     Achieves optimal ventilation and oxygenation Progressing        SAFETY ADULT     Patient will remain free of falls Progressing

## 2018-08-09 ENCOUNTER — TELEPHONE (OUTPATIENT)
Dept: INTERNAL MEDICINE CLINIC | Facility: CLINIC | Age: 58
End: 2018-08-09

## 2018-08-09 VITALS
DIASTOLIC BLOOD PRESSURE: 81 MMHG | HEIGHT: 66 IN | SYSTOLIC BLOOD PRESSURE: 120 MMHG | OXYGEN SATURATION: 97 % | HEART RATE: 93 BPM | TEMPERATURE: 97.9 F | RESPIRATION RATE: 18 BRPM | WEIGHT: 139.9 LBS | BODY MASS INDEX: 22.48 KG/M2

## 2018-08-09 LAB
ACTH PLAS-MCNC: 19.3 PG/ML (ref 7.2–63.3)
ANION GAP SERPL CALCULATED.3IONS-SCNC: 9 MMOL/L (ref 4–13)
BASOPHILS # BLD AUTO: 0.02 THOUSANDS/ΜL (ref 0–0.1)
BASOPHILS NFR BLD AUTO: 0 % (ref 0–1)
BUN SERPL-MCNC: 14 MG/DL (ref 5–25)
CALCIUM SERPL-MCNC: 7.7 MG/DL (ref 8.3–10.1)
CHLORIDE SERPL-SCNC: 117 MMOL/L (ref 100–108)
CO2 SERPL-SCNC: 20 MMOL/L (ref 21–32)
CREAT SERPL-MCNC: 1.04 MG/DL (ref 0.6–1.3)
EOSINOPHIL # BLD AUTO: 0.13 THOUSAND/ΜL (ref 0–0.61)
EOSINOPHIL NFR BLD AUTO: 2 % (ref 0–6)
ERYTHROCYTE [DISTWIDTH] IN BLOOD BY AUTOMATED COUNT: 16 % (ref 11.6–15.1)
GFR SERPL CREATININE-BSD FRML MDRD: 80 ML/MIN/1.73SQ M
GLUCOSE SERPL-MCNC: 89 MG/DL (ref 65–140)
HCT VFR BLD AUTO: 24.5 % (ref 36.5–49.3)
HGB BLD-MCNC: 7.3 G/DL (ref 12–17)
IMM GRANULOCYTES # BLD AUTO: 0.07 THOUSAND/UL (ref 0–0.2)
IMM GRANULOCYTES NFR BLD AUTO: 1 % (ref 0–2)
LYMPHOCYTES # BLD AUTO: 1.88 THOUSANDS/ΜL (ref 0.6–4.47)
LYMPHOCYTES NFR BLD AUTO: 22 % (ref 14–44)
MCH RBC QN AUTO: 27.4 PG (ref 26.8–34.3)
MCHC RBC AUTO-ENTMCNC: 29.8 G/DL (ref 31.4–37.4)
MCV RBC AUTO: 92 FL (ref 82–98)
MONOCYTES # BLD AUTO: 0.83 THOUSAND/ΜL (ref 0.17–1.22)
MONOCYTES NFR BLD AUTO: 10 % (ref 4–12)
NEUTROPHILS # BLD AUTO: 5.63 THOUSANDS/ΜL (ref 1.85–7.62)
NEUTS SEG NFR BLD AUTO: 65 % (ref 43–75)
NRBC BLD AUTO-RTO: 0 /100 WBCS
PLATELET # BLD AUTO: 282 THOUSANDS/UL (ref 149–390)
PMV BLD AUTO: 10.4 FL (ref 8.9–12.7)
POTASSIUM SERPL-SCNC: 3.7 MMOL/L (ref 3.5–5.3)
RBC # BLD AUTO: 2.66 MILLION/UL (ref 3.88–5.62)
SODIUM SERPL-SCNC: 146 MMOL/L (ref 136–145)
WBC # BLD AUTO: 8.56 THOUSAND/UL (ref 4.31–10.16)

## 2018-08-09 PROCEDURE — 94760 N-INVAS EAR/PLS OXIMETRY 1: CPT

## 2018-08-09 PROCEDURE — 85025 COMPLETE CBC W/AUTO DIFF WBC: CPT | Performed by: INTERNAL MEDICINE

## 2018-08-09 PROCEDURE — 80048 BASIC METABOLIC PNL TOTAL CA: CPT | Performed by: INTERNAL MEDICINE

## 2018-08-09 PROCEDURE — 99238 HOSP IP/OBS DSCHRG MGMT 30/<: CPT | Performed by: INTERNAL MEDICINE

## 2018-08-09 PROCEDURE — 94640 AIRWAY INHALATION TREATMENT: CPT

## 2018-08-09 RX ORDER — AMOXICILLIN AND CLAVULANATE POTASSIUM 875; 125 MG/1; MG/1
1 TABLET, FILM COATED ORAL EVERY 12 HOURS SCHEDULED
Qty: 10 TABLET | Refills: 0 | Status: SHIPPED | OUTPATIENT
Start: 2018-08-09 | End: 2018-08-14

## 2018-08-09 RX ORDER — MESALAMINE 500 MG/1
500 CAPSULE, EXTENDED RELEASE ORAL 3 TIMES DAILY
Refills: 0 | Status: SHIPPED | OUTPATIENT
Start: 2018-08-09 | End: 2019-08-09 | Stop reason: ALTCHOICE

## 2018-08-09 RX ADMIN — MESALAMINE 500 MG: 250 CAPSULE ORAL at 15:29

## 2018-08-09 RX ADMIN — GABAPENTIN 400 MG: 400 CAPSULE ORAL at 08:32

## 2018-08-09 RX ADMIN — AMIODARONE HYDROCHLORIDE 200 MG: 200 TABLET ORAL at 08:31

## 2018-08-09 RX ADMIN — METRONIDAZOLE 500 MG: 500 INJECTION, SOLUTION INTRAVENOUS at 02:45

## 2018-08-09 RX ADMIN — ALBUTEROL SULFATE 2 PUFF: 90 AEROSOL, METERED RESPIRATORY (INHALATION) at 08:32

## 2018-08-09 RX ADMIN — TIOTROPIUM BROMIDE 18 MCG: 18 CAPSULE ORAL; RESPIRATORY (INHALATION) at 08:32

## 2018-08-09 RX ADMIN — METRONIDAZOLE 500 MG: 500 INJECTION, SOLUTION INTRAVENOUS at 11:06

## 2018-08-09 RX ADMIN — HEPARIN SODIUM 5000 UNITS: 5000 INJECTION, SOLUTION INTRAVENOUS; SUBCUTANEOUS at 14:24

## 2018-08-09 RX ADMIN — LISINOPRIL 2.5 MG: 2.5 TABLET ORAL at 08:32

## 2018-08-09 RX ADMIN — ALBUTEROL SULFATE 2 PUFF: 90 AEROSOL, METERED RESPIRATORY (INHALATION) at 15:29

## 2018-08-09 RX ADMIN — NYSTATIN: 100000 OINTMENT TOPICAL at 08:34

## 2018-08-09 RX ADMIN — MESALAMINE 500 MG: 250 CAPSULE ORAL at 08:32

## 2018-08-09 RX ADMIN — NICOTINE 1 PATCH: 21 PATCH, EXTENDED RELEASE TRANSDERMAL at 08:40

## 2018-08-09 RX ADMIN — CEFTRIAXONE 1000 MG: 1 INJECTION, POWDER, FOR SOLUTION INTRAMUSCULAR; INTRAVENOUS at 12:04

## 2018-08-09 RX ADMIN — ALBUTEROL SULFATE 2.5 MG: 2.5 SOLUTION RESPIRATORY (INHALATION) at 09:12

## 2018-08-09 RX ADMIN — Medication 30000 UNITS: at 08:32

## 2018-08-09 RX ADMIN — SODIUM CHLORIDE, SODIUM GLUCONATE, SODIUM ACETATE, POTASSIUM CHLORIDE, MAGNESIUM CHLORIDE, SODIUM PHOSPHATE, DIBASIC, AND POTASSIUM PHOSPHATE 150 ML/HR: .53; .5; .37; .037; .03; .012; .00082 INJECTION, SOLUTION INTRAVENOUS at 05:01

## 2018-08-09 RX ADMIN — TRAMADOL HYDROCHLORIDE 100 MG: 50 TABLET, FILM COATED ORAL at 07:15

## 2018-08-09 RX ADMIN — TRAMADOL HYDROCHLORIDE 100 MG: 50 TABLET, FILM COATED ORAL at 15:28

## 2018-08-09 RX ADMIN — FOLIC ACID 1 MG: 1 TABLET ORAL at 08:31

## 2018-08-09 RX ADMIN — Medication 100 MG: at 08:31

## 2018-08-09 RX ADMIN — HEPARIN SODIUM 5000 UNITS: 5000 INJECTION, SOLUTION INTRAVENOUS; SUBCUTANEOUS at 05:58

## 2018-08-09 RX ADMIN — ASPIRIN 81 MG 81 MG: 81 TABLET ORAL at 08:31

## 2018-08-09 RX ADMIN — GABAPENTIN 400 MG: 400 CAPSULE ORAL at 15:28

## 2018-08-09 NOTE — PROGRESS NOTES
Progress Note - Colorectal Surgery   Hyun Coronado 66 y o  male MRN: [id number]  Unit/Bed#: [facility] 602-01 Encounter: [id number]    Assessment/Plan:  66 y o  male with hx perforated transverse colon 2/2 ischemia s/p ex lap, EDITH, ileocolonic resection with primary anastomosis, diverting ileostomy 5/22, now with dehydration, hypotension    Hypotension   Assessment & Plan    Likely 2/2 dehydration  Patient remains off pressors  Continue IVF resuscitation        Ileostomy in place [facility])   Assessment & Plan    Ostomy output stable  Diet as tolerated  Replete lytes PRN        Crohn disease ([facility] [address] )   Assessment & Plan    Currently on rocephin/flagyl per medicine            Subjective/Objective     Subjective: Patient reports pain, unchanged from baseline  Having ileostomy function  Objective:     Vitals: Blood pressure 109/75, pulse 90, temperature 98 2 °F (36 8 °C), resp  rate 18, height 5' 6" (1 676 m), weight 63 5 kg (139 lb 14 4 oz), SpO2 99 %  ,Body mass index is 22 58 kg/m²  I/O       08/07 0701 - 08/08 0700 08/08 0701 - 08/09 0700    P  O  580 200    I V  (mL/kg) 4593 3 (72 3) 4005 8 (63 1)    IV Piggyback 1926 7 130    Total Intake(mL/kg) 7100 (111 8) 4335 8 (68 3)    Urine (mL/kg/hr) 1875 (1 2) 2500 (1 6)    Stool 1540 1250    Total Output 3415 3750    Net +3685 +585 8          Unmeasured Urine Occurrence 1 x           Physical Exam:  GEN: NAD  HEENT: MMM  CV: RRR  Lung: Normal effort  Ab: Soft, NT/ND, ileostomy pink with liquid stool  Extrem: No CCE  Neuro:  A+Ox3    Lab, Imaging and other studies: CBC with diff: No results found for: WBC, HGB, HCT, MCV, PLT, ADJUSTEDWBC, MCH, MCHC, RDW, MPV, NRBC, BMP/CMP: No results found for: NA, K, CL, CO2, ANIONGAP, BUN, CREATININE, GLUCOSE, CALCIUM, AST, ALT, ALKPHOS, PROT, ALBUMIN, BILITOT, EGFR, Magnesium: No results found for: MAG  VTE Pharmacologic Prophylaxis: Heparin  VTE Mechanical Prophylaxis: sequential compression device

## 2018-08-09 NOTE — RESTORATIVE TECHNICIAN NOTE
Restorative Specialist Mobility Note       Activity: Other (Comment) (Educated/encouraged pt to ambulate with assistance 3-4 x's/day, pt refused 2* stating that he is being discharged soon nursing aware  Bed alarm on   Pt callbell, phone/tray within reach )     Assistive Device: Front wheel walker          [employer] BS, Restorative Technician, [employer]

## 2018-08-09 NOTE — PROGRESS NOTES
IM Residency Progress Note   Unit/Bed#: [facility] 602-01 Encounter: [id number]  SOD Team A      Radha Kirkland 66 y o  male [id number]    Hospital Stay Days: 2      Assessment/Plan:    Principal Problem:    Abdominal pain  Active Problems:    Crohn disease ([facility] [address] )    Hypertension    Leukocytosis    Asthma    Atherosclerosis of coronary artery    COPD (chronic obstructive pulmonary disease) ([facility])    Dyslipidemia    Alcohol abuse    Cigarette nicotine dependence without complication    Ileostomy in place ([facility] )    Anemia    Hypotension    DEBBY (acute kidney injury) ([facility] )    Ambulatory dysfunction    1  Abdominal pain with ileostomy in place     -Patient states that his pain is stable    -Yesterday patient stated that his pain was mostly around his ileostomy site  However, today he states that it is round ileostomy site as well as his lower left quadrant, but states that has been the area since his original surgery for ileostomy  He is unable to characterize it on questioning, and adamantly refuses any palpitation on his abdomen   -Patient's pain regimen changed to tramadol 100 mg Q 8 hours  States that his pain is stable in intensity   -Surgery resident contacted and states she feels that the ileostomy is not bulging out and appears stable  Patient requested to speak again with surgery attending       2  DEBBY w  Acidosis -  Acidosis likely due to fluid resucitation       -DEBBY resolved  -Bicarb 20 today      3  Hypotension - Ileostomy output vs  ? Adrenal insuff       -Cosyntropin test was negative   -Patient's blood pressures have improved to lower 897B systolic   -Patient denies any dizziness   -Will continue IV fluids      4  Crohn Disease       -Will continue to monitor    -Will follow surgery recs  -Inflammatory changes seen on CT patient on Rocephin  Cont  Flagyl       5  COPD   -Currently stable       6  Anemia - dropped 2 grams since admission but acute drop is likely hemodilutional due to fluid resuscitation  Currently stable      7  HTN - Will start patients lisinopril now that hypotension has improved       8  HLD - Stable       9  Nicotine dependence   -Nicotine patch  DVT prophylaxis:  Patient was ordered SCDs however he adamantly refuses to have them on  States that he turned them off" without providing any sort of explanation as to why  Disposition:  Continue inpatient       Subjective:   Patient seen examined at bedside  States that his abdominal pain is stable intensity, relieved by his tramadol  He however stated yesterday that his pain was primarily around his ileostomy site  But, on questioning today he states that his pain is both in his ileostomy site which she feels has bulged out,  as well s located in his left lower quadrant  He refused to characterize it, but states that it is severe but stable  He denies any episodes of chest pain or shortness of breath or dizziness  No acute overnight events per nursing  Patient states that he took off his SCDs without providing any explanation as to why and refuses to have them on  Vitals: Temp (24hrs), Av 2 °F (36 8 °C), Min:97 9 °F (36 6 °C), Max:98 6 °F (37 °C)  Current: Temperature: 97 9 °F (36 6 °C)  Vitals:    18 2220 18 0310 18 0646 18 0914   BP: 96/77 109/75 110/75    Pulse: (!) 110 90 91    Resp: 18 18     Temp:  98 2 °F (36 8 °C) 97 9 °F (36 6 °C)    TempSrc:       SpO2: 97% 99% 98% 99%   Weight:       Height:        Body mass index is 22 58 kg/m²  I/O last 24 hours: In: 4335 8 [P O :200; I V :4005 8;  IV Piggyback:130]  Out: 3950 [Urine:2700; Stool:1250]      Physical Exam: /75   Pulse 91   Temp 97 9 °F (36 6 °C)   Resp 18   Ht 5' 6" (1 676 m)   Wt 63 5 kg (139 lb 14 4 oz)   SpO2 99%   BMI 22 58 kg/m²   General appearance: alert and oriented, in no acute distress  Head: Normocephalic, without obvious abnormality, atraumatic  Lungs: clear to auscultation bilaterally  Heart: regular rate and rhythm  Abdomen: Ileostomy in place, with good output  No evidence of significant erythema around the ileostomy site  Patient refuses to be palpated on physical exam   No distention noted  Bandages in place on abdomen  Extremities: Moves all 4 extremities spontaneously  Neurologic: Grossly normal patient appears to be agitated      Invasive Devices     Peripheral Intravenous Line            Peripheral IV 08/07/18 Right Antecubital 2 days          Drain            Colostomy  RUQ 78 days                          Labs:   Recent Results (from the past 24 hour(s))   Cortisol    Collection Time: 08/08/18  3:53 PM   Result Value Ref Range    Cortisol, Random 7 7 ug/dL   Cortisol    Collection Time: 08/08/18  4:41 PM   Result Value Ref Range    Cortisol, Random 21 5 ug/dL   Cortisol    Collection Time: 08/08/18  5:20 PM   Result Value Ref Range    Cortisol, Random 25 5 ug/dL   Basic metabolic panel    Collection Time: 08/09/18  5:18 AM   Result Value Ref Range    Sodium 146 (H) 136 - 145 mmol/L    Potassium 3 7 3 5 - 5 3 mmol/L    Chloride 117 (H) 100 - 108 mmol/L    CO2 20 (L) 21 - 32 mmol/L    Anion Gap 9 4 - 13 mmol/L    BUN 14 5 - 25 mg/dL    Creatinine 1 04 0 60 - 1 30 mg/dL    Glucose 89 65 - 140 mg/dL    Calcium 7 7 (L) 8 3 - 10 1 mg/dL    eGFR 80 ml/min/1 73sq m   CBC and differential    Collection Time: 08/09/18  5:18 AM   Result Value Ref Range    WBC 8 56 4 31 - 10 16 Thousand/uL    RBC 2 66 (L) 3 88 - 5 62 Million/uL    Hemoglobin 7 3 (L) 12 0 - 17 0 g/dL    Hematocrit 24 5 (L) 36 5 - 49 3 %    MCV 92 82 - 98 fL    MCH 27 4 26 8 - 34 3 pg    MCHC 29 8 (L) 31 4 - 37 4 g/dL    RDW 16 0 (H) 11 6 - 15 1 %    MPV 10 4 8 9 - 12 7 fL    Platelets 436 962 - 990 Thousands/uL    nRBC 0 /100 WBCs    Neutrophils Relative 65 43 - 75 %    Immat GRANS % 1 0 - 2 %    Lymphocytes Relative 22 14 - 44 %    Monocytes Relative 10 4 - 12 %    Eosinophils Relative 2 0 - 6 %    Basophils Relative 0 0 - 1 %    Neutrophils Absolute 5 63 1 85 - 7 62 Thousands/µL    Immature Grans Absolute 0 07 0 00 - 0 20 Thousand/uL    Lymphocytes Absolute 1 88 0 60 - 4 47 Thousands/µL    Monocytes Absolute 0 83 0 17 - 1 22 Thousand/µL    Eosinophils Absolute 0 13 0 00 - 0 61 Thousand/µL    Basophils Absolute 0 02 0 00 - 0 10 Thousands/µL       Radiology Results: I have personally reviewed pertinent reports  Other Diagnostic Testing:   I have personally reviewed pertinent reports          Active Meds:   Current Facility-Administered Medications   Medication Dose Route Frequency    acetaminophen (TYLENOL) tablet 650 mg  650 mg Oral Q6H PRN    albuterol (PROVENTIL HFA,VENTOLIN HFA) inhaler 2 puff  2 puff Inhalation TID    albuterol (PROVENTIL HFA,VENTOLIN HFA) inhaler 2 puff  2 puff Inhalation Q4H PRN    albuterol inhalation solution 2 5 mg  2 5 mg Nebulization Q6H PRN    aluminum-magnesium hydroxide-simethicone (MYLANTA) 200-200-20 mg/5 mL oral suspension 15 mL  15 mL Oral Q4H PRN    amiodarone tablet 200 mg  200 mg Oral Daily With Breakfast    aspirin chewable tablet 81 mg  81 mg Oral Daily    atorvastatin (LIPITOR) tablet 40 mg  40 mg Oral Daily With Dinner    cefTRIAXone (ROCEPHIN) 1,000 mg in dextrose 5 % 50 mL IVPB  1,000 mg Intravenous Q46H    folic acid (FOLVITE) tablet 1 mg  1 mg Oral Daily    gabapentin (NEURONTIN) capsule 400 mg  400 mg Oral TID    heparin (porcine) subcutaneous injection 5,000 Units  5,000 Units Subcutaneous Q8H [address]    lisinopril (ZESTRIL) tablet 2 5 mg  2 5 mg Oral Daily    mesalamine (PENTASA) ER capsule 500 mg  500 mg Oral TID    metroNIDAZOLE (FLAGYL) IVPB (premix) 500 mg  500 mg Intravenous Q8H    multi-electrolyte (ISOLYTE-S PH 7 4 equivalent) IV solution  150 mL/hr Intravenous Continuous    nicotine (NICODERM CQ) 21 mg/24 hr TD 24 hr patch 1 patch  1 patch Transdermal Daily    nystatin (MYCOSTATIN) ointment   Topical BID    thiamine (VITAMIN B1) tablet 100 mg  100 mg Oral Daily    tiotropium [facility]) capsule for inhaler 18 mcg  18 mcg Inhalation Daily    traMADol (ULTRAM) tablet 100 mg  100 mg Oral Q8H PRN    vitamin A capsule 30,000 Units  30,000 Units Oral Daily         VTE Pharmacologic Prophylaxis: Heparin  VTE Mechanical Prophylaxis: reason for no mechanical VTE prophylaxis Ordered but patient refuses      Kale Peace DO

## 2018-08-09 NOTE — DISCHARGE SUMMARY
[facility] CENTRAL Discharge Summary - Medical Kevin Carlos 66 y o  male MRN: [id number]    [address] [facility] Room / Bed: [facility] Encounter: [id number]    BRIEF OVERVIEW    Admitting Provider: James Aviles MD  Discharge Provider: James Aviles MD  Primary Care Physician at Discharge: Bhupendra Roberts DO    Discharge To: Home  Facility / Family Member Name: [facility]  Phone Number: [phone]     Admission Date: 8/7/2018     Discharge Date: No discharge date for patient encounter  Primary Discharge Diagnosis  Principal Problem:    Abdominal pain  Active Problems:    Crohn disease ([facility] )    Hypertension    Leukocytosis    Asthma    Atherosclerosis of coronary artery    COPD (chronic obstructive pulmonary disease) ([facility])    Dyslipidemia    Alcohol abuse    Cigarette nicotine dependence without complication    Ileostomy in place ([facility] )    Anemia    Hypotension    DEBBY (acute kidney injury) ([place] [address] )    Ambulatory dysfunction  Resolved Problems:    * No resolved hospital problems  *      Other Problems Addressed: None    Consulting Providers   Colorectal Surgery, Dr Nadia Schuster         Therapeutic Operative Procedures Performed  None    Diagnostic Procedures Performed  CBC, BMP, Cortisol, cosyntropin stimulation test, C  Diff  Urine culture, urine microscope, Protime APTT  Blood culture  Discharge Disposition: Home with [address]  Discharged With Lines: no    Test Results Pending at Discharge: None    Outpatient [facility] August 13th at 8:00 a m  Follow up with consulting providers  Dr Kenneth Aguirre August 14th at 2:00 pm    Active Issues Requiring Follow-up   none    Code Status: Level 1 - Full Code  Advance Directive and Living Will: <no information>  Power of :    POLST:      Medications   See after visit summary for reconciled discharge medications provided to patient and family      Allergies  Allergies   Allergen Reactions    Medical Tape     Other      Brown cloth band aids       Latex Rash     Discharge Diet: regular diet  Activity restrictions: Activity as tolerated    [facility]  This is a 62-year-old male with past medical history significant for Crohn disease, hypertension, asthma, COPD, alcohol abuse, nicotine dependence, who originally presented to the ER on August 7th due to intractable abdominal pain  Of note, the patient is status post hemicolectomy in 2005, as well as status post diverting ileostomy 6/1346 complicated by intra-abdominal abscess  Outpatient, the patient had been taking tramadol with alleviation of his pain  On presentation to the ER the patient stated his pain had worsened but was localized to around his stoma, but was still having output without any bleeding  In the ER the patient was found to be hypotensive with a blood pressure of 78 systolic  The patient was administered fluids as well as pain management with oxycodone  Labs were drawn which revealed a lactic of 1 1, a PTT of 29, a WBC of 18 9, a hemoglobin of 9 8 as well as an EKG, and troponins were performed  The patient was also found to have an DEBBY on admission  Patient's blood pressure consistently ran low although patient was mentating well, and patient was eventually administered steroids for concern about possible adrenal insuffiencey with improvement of blood pressure  A CT abdomen was performed which revealed Inflammatory changes in the abdomen and pelvis with small fluid collections unchanged from prior study  The patient was admitted for the floor for intractable abdominal pain as well as DEBBY  Surgery was consulted and felt there were no surgical interventions  The patient was started on Ciprofloxacin + Flagyl and eventually transitioned to Rocephin + flagyl due to concerns regarding inflammation seen on CT exam and elevated WBC count   Patient was originally on 50 mg of oxycodone a day, this was transitioned to tramadol 3 times daily due to patient stating that tramadol was appropriately treating his pain at home  However, patient was very inconsistent regarding his pain, where his pain was located, and what was appropriately treating it  During the admission the patient's DEBBY improved to a creatinine of 1 04 at discharge  The patient did have a small iatrogenic acidosis due to fluid administration secondary to low blood pressures, but patient's CO2 improved to 20 at discharge  A morning cortisol was found to be 4 9 on the patient, but a cosyntropin test was negative for adrenal insuffiencey  The patient was seen by surgery during his stay who felt that the patient's stoma site was appropriately healing, and they did not appreciate any abdominal pain on their physical examination on the day of discharge, and felt that the patient was ok to discharge with follow up  Surgery did comment that they feel that patient's DEBBY was secondary to ileostomy output, but felt with resolution of patient's DEBBY the patient was ok to discharge  On the day of D/C the patient's WBC had resolved, the patient was alert and oriented and did not appear in any distress, blood pressure had improved, patient was afebrile and appeared in stable condition  The patient did admit that this pain he has had is the same pain he has had since his operation  The patient did request more pain medicines, but this had been explained to patient that due to his colorectal surgeon providing him with pain medicines we would be unable to write him home prescriptions for oxycodone, and that at this time surgery feels that the post operative course has been as expected, they do not appreciate any evidence of tenderness or complications on physical exam and it would be best to follow up in clinic    The patient was given appropiate follow up instructions, appointments were made for him with both his Colorectal surgeon as well as follow up with his primary care  The patient was given a 5 day course of Augmentin  The patient was instructed on symptoms to return to the hospital, with patient verbalizing understanding  Patient was discharged in stable condition  Presenting Problem/History of Present Illness  Principal Problem:    Abdominal pain  Active Problems:    Crohn disease ([facility] )    Hypertension    Leukocytosis    Asthma    Atherosclerosis of coronary artery    COPD (chronic obstructive pulmonary disease) ([facility])    Dyslipidemia    Alcohol abuse    Cigarette nicotine dependence without complication    Ileostomy in place ([place] [address] )    Anemia    Hypotension    DEBBY (acute kidney injury) ([place] [address] )    Ambulatory dysfunction  Resolved Problems:    * No resolved hospital problems  *        Other Pertinent Test Results  None    Discharge Condition: Stable    Discharge  Statement   I spent 30 Miinutes discharging the patient  This time was spent on the day of discharge  I had direct contact with the patient on the day of discharge  Additional documentation is required if more than 30 minutes were spent on discharge

## 2018-08-09 NOTE — DISCHARGE INSTRUCTIONS
Acute Abdominal Pain   WHAT YOU NEED TO KNOW:   The cause of your abdominal pain may not be found  If a cause is found, treatment will depend on what the cause is  DISCHARGE INSTRUCTIONS:   Seek care immediately if:   · You vomit blood or cannot stop vomiting  · You have blood in your bowel movement or it looks like tar  · You have bleeding from your ileostomy  · Your abdomen is larger than usual, more painful, and hard  · You have severe pain in your abdomen  · You stop passing gas and having bowel movements  · You feel weak, dizzy, or faint  Contact your healthcare provider if:   · You have a fever  · You have new signs and symptoms  · Your symptoms do not get better with treatment  · You have questions or concerns about your condition or care  Medicines  may be given to decrease pain, treat an infection, and manage your symptoms  Take your medicine as directed  Call your healthcare provider if you think your medicine is not helping or if you have side effects  Tell him if you are allergic to any medicine  Keep a list of the medicines, vitamins, and herbs you take  Include the amounts, and when and why you take them  Bring the list or the pill bottles to follow-up visits  Carry your medicine list with you in case of an emergency  Manage your symptoms:   · Apply heat  on your abdomen for 20 to 30 minutes every 2 hours for as many days as directed  Heat helps decrease pain and muscle spasms  · Manage your stress  Stress may cause abdominal pain  Your healthcare provider may recommend relaxation techniques and deep breathing exercises to help decrease your stress  Your healthcare provider may recommend you talk to someone about your stress or anxiety, such as a counselor or a trusted friend  Get plenty of sleep and exercise regularly  · Limit or do not drink alcohol  Alcohol can make your abdominal pain worse   Ask your healthcare provider if it is safe for you to drink alcohol  Also ask how much is safe for you to drink  · Do not smoke  Nicotine and other chemicals in cigarettes can damage your esophagus and stomach  Ask your healthcare provider for information if you currently smoke and need help to quit  E-cigarettes or smokeless tobacco still contain nicotine  Talk to your healthcare provider before you use these products  Make changes to the food you eat as directed:  Do not eat foods that cause abdominal pain or other symptoms  Eat small meals more often  · Eat more high-fiber foods if you are constipated  High-fiber foods include fruits, vegetables, whole-grain foods, and legumes  · Do not eat foods that cause gas if you have bloating  Examples include broccoli, cabbage, and cauliflower  Do not drink soda or carbonated drinks, because these may also cause gas  · Do not eat foods or drinks that contain sorbitol or fructose if you have diarrhea and bloating  Some examples are fruit juices, candy, jelly, and sugar-free gum  · Do not eat high-fat foods, such as fried foods, cheeseburgers, hot dogs, and desserts  · Limit or do not drink caffeine  Caffeine may make symptoms, such as heart burn or nausea, worse  · Drink plenty of liquids to prevent dehydration from diarrhea or vomiting  Ask your healthcare provider how much liquid to drink each day and which liquids are best for you  Follow up with your healthcare provider as directed:  Write down your questions so you remember to ask them during your visits  © 2017 [address] [employer] Information is for End User's use only and may not be sold, redistributed or otherwise used for commercial purposes  All illustrations and images included in CareNotes® are the copyrighted property of [employer]  or Amrit Vallejo  The above information is an  only  It is not intended as medical advice for individual conditions or treatments   Talk to your doctor, nurse or pharmacist before following any medical regimen to see if it is safe and effective for you

## 2018-08-10 ENCOUNTER — TRANSITIONAL CARE MANAGEMENT (OUTPATIENT)
Dept: INTERNAL MEDICINE CLINIC | Facility: CLINIC | Age: 58
End: 2018-08-10

## 2018-08-12 LAB
BACTERIA BLD CULT: NORMAL
BACTERIA BLD CULT: NORMAL

## 2018-08-13 ENCOUNTER — OFFICE VISIT (OUTPATIENT)
Dept: INTERNAL MEDICINE CLINIC | Facility: CLINIC | Age: 58
End: 2018-08-13
Payer: COMMERCIAL

## 2018-08-13 VITALS — TEMPERATURE: 98 F | DIASTOLIC BLOOD PRESSURE: 68 MMHG | SYSTOLIC BLOOD PRESSURE: 86 MMHG | HEART RATE: 88 BPM

## 2018-08-13 DIAGNOSIS — Z93.2 ILEOSTOMY IN PLACE (HCC): Primary | ICD-10-CM

## 2018-08-13 DIAGNOSIS — I95.89 OTHER SPECIFIED HYPOTENSION: ICD-10-CM

## 2018-08-13 PROCEDURE — 1111F DSCHRG MED/CURRENT MED MERGE: CPT | Performed by: NURSE PRACTITIONER

## 2018-08-13 PROCEDURE — 99496 TRANSJ CARE MGMT HIGH F2F 7D: CPT | Performed by: NURSE PRACTITIONER

## 2018-08-13 NOTE — PATIENT INSTRUCTIONS
Crohn Disease   WHAT YOU NEED TO KNOW:   Crohn disease is an inflammatory disease of the digestive system  Crohn disease causes the lining of your intestines to become inflamed  The lining of your mouth, esophagus, or stomach may also be affected by Crohn disease  DISCHARGE INSTRUCTIONS:   Return to the emergency department if:   · You suddenly have trouble breathing  · You vomit blood, or your vomit looks like coffee grounds  · You have a fast heart rate, fast breathing, or are too dizzy to stand  · You have severe pain in your stomach  Contact your healthcare provider if:   · You have tar-colored bowel movements or you see blood in your bowel movements  · You have a fever or chills  · The pain in your abdomen does not go away or gets worse after you take medicine  · Your abdomen is swollen  · You are losing weight without trying  · You have questions or concerns about your condition or care  Medicines:   · Medicines  may be used to decrease inflammation in your digestive tract  You may need antibiotics to treat or prevent an infection and antidiarrheal medicine to decrease diarrhea  Immunosuppressants may also be given to slow your immune system  · Take your medicine as directed  Contact your healthcare provider if you think your medicine is not helping or if you have side effects  Tell him of her if you are allergic to any medicine  Keep a list of the medicines, vitamins, and herbs you take  Include the amounts, and when and why you take them  Bring the list or the pill bottles to follow-up visits  Carry your medicine list with you in case of an emergency  Follow up with your healthcare provider as directed:  Record the number of bowel movements you have each day and describe the color and form (liquid, soft, or hard)  Write down if you saw blood in your bowel movement  Bring the record with you when you see your healthcare provider   Write down your questions so you remember to ask them during your visits  Nutrition:  Keep a record of everything you eat and drink  Include any symptoms the food or drink causes or makes worse  You may need to avoid certain foods  Dairy, alcohol, hot spices, and high-fiber foods are common examples of foods that may worsen your symptoms  Your healthcare provider may recommend that you take vitamins or minerals  Always ask your healthcare provider before you take vitamins or nutritional supplements  Do not smoke:  Nicotine and other chemicals in cigarettes and cigars can cause lung damage and increase your risk for Crohn disease  Ask your healthcare provider for information if you currently smoke and need help to quit  E-cigarettes or smokeless tobacco still contain nicotine  Talk to your healthcare provider before you use these products  © 2017 [facility] Information is for End User's use only and may not be sold, redistributed or otherwise used for commercial purposes  All illustrations and images included in CareNotes® are the copyrighted property of [employer]  or [employer]  The above information is an  only  It is not intended as medical advice for individual conditions or treatments  Talk to your doctor, nurse or pharmacist before following any medical regimen to see if it is safe and effective for you

## 2018-08-13 NOTE — PROGRESS NOTES
Assessment/Plan:     Diagnoses and all orders for this visit:    Ileostomy in place [facility])        -    Advised to keep apts with GI team        -     Advised to reports any problems with ileostomy bag ASAP        -     Change bags as directed    Other specified hypotension        -     Will monitor        -     Advised to make sure the VNA nurse is checking and to report ASAP if BP starts dropping too low          Subjective: patient presents to the clinic for TCM     Patient ID: Yung Carlson is a 66 y o  male  HPI  He was seen in the hosp for primary diagnosis abd pain  Admitted between 08/07/2018 and discharged 08/09/2018  He was found to be hypovolemic and with acute DEBBY of unknown etiology, in the hospital, with normal adrenal function  Several liters of albumin and NS were administered and he returned to normal indicators  Today his BP is 86/68 mmHg by MA and 92/68 mmHg by me, L arm, sitting  He reports he eats well and drinks plenty  Can't say how much fluid he drinks daily  Denies alcohol use  Smokes about to cigars a day, no cigarette  Has an ileostomy bag, secondary to crohn's disease  He has an apt with GI team august 14th at 2pm, with cardiology on aug 24th 2018 and with PCP at [facility] on Oct 8th 2018  VNA going to his house every other day  He is advised to call if BP drops or if new s/s develop  Denies need for any meds at this time  No dizziness, HA, change in vision, CP  Some SOB with exertion, and he easily fatigues  Denies fever or chills  His records show prior Hx of protein calorie malnutrition and systolic heart failure d/y unknown cause  The following portions of the patient's history were reviewed and updated as appropriate: allergies, current medications, past family history, past medical history, past social history, past surgical history and problem list     Review of Systems   Constitutional: Positive for fatigue (with exertion)  Negative for appetite change, chills and fever  Respiratory: Positive for shortness of breath (with exertion)  Negative for cough, chest tightness and wheezing  Cardiovascular: Negative for chest pain and palpitations  Gastrointestinal: Negative for abdominal pain, constipation, diarrhea, nausea and vomiting  Skin: Positive for wound (surgical wound for ileostomy)  Negative for color change, pallor and rash  Neurological: Negative for dizziness, tremors, seizures, syncope and headaches  Objective:      BP (!) 86/68 (BP Location: Right arm, Patient Position: Sitting, Cuff Size: Standard)   Pulse 88   Temp 98 °F (36 7 °C) (Oral)        Physical Exam   Constitutional: He is oriented to person, place, and time  He appears well-developed and well-nourished  No distress  Cardiovascular: Normal rate, regular rhythm and normal heart sounds  No murmur heard  Pulmonary/Chest: Effort normal and breath sounds normal  No respiratory distress  He has no wheezes  Abdominal: Soft  Bowel sounds are normal  He exhibits no distension  There is no rebound and no guarding  There is an ileostomy on the R lower quadrant  The stoma looks WNL  Before red  NTTP  Stool in ileostomy bag  Brownish normal color  Vertical scar in the umbilical area  Healing well  Dry, NTTP, no unusual redness  Neurological: He is alert and oriented to person, place, and time  Skin: He is not diaphoretic  Psychiatric: He has a normal mood and affect   His behavior is normal  Judgment and thought content normal

## 2018-08-13 NOTE — CASE MANAGEMENT
Shilo Nice MD Resident Attested Internal Medicine  Discharge Summaries Date of Service: 7/17/2018  3:46 PM      Attestation signed by Nanci Hernandez MD at 7/18/2018 1:38 PM      Discharge summary by Shilo Nice MD reviewed excepted its entirety  Principal diagnosis necessitating admission:  Intra-abdominal abscess secondary to Crohn's disease  Secondary diagnoses and management, as detailed in resident A&P, reviewed and accepted  Pertinent lab studies/radiographic results reviewed and discussed with resident  Key findings SANTA drain in place and functional   Leukocytosis stable at time of discharge  Patient strongly encouraged to avoid all alcohol consumption and tobacco use  Ensure supplements provided for protein calorie malnutrition  Outpatient management of COPD with Spiriva, Xopenex and albuterol as detailed  Deep breathing and pulmonary toileting on a regular basis encouraged  [facility] Discharge Summary - Hardy Moy 66 y o  male MRN: [id number]     [address] [facility] 5 MED SURG/SD Room / Bed: [place] [address]/[place] [address] Encounter: [id number]     BRIEF OVERVIEW     Admitting Provider: Kierra Ford MD  Discharge Provider: Nanci Hernandez MD  Primary Care Physician at Discharge: Kayleigh Craven 92  Admission Date: 7/8/2018     Discharge Date: No discharge date for patient encounter   [facility] Course  Patient is a 24-year-old male with history of right hemicolectomy, perforated transverse colon s/p EDITH, ileal colon resection, diverting ileostomy in 95/6979, complicated by intra-abdominal abscess s/p IR drain placed 06/26/2018 who presented on 07/08 with nausea and abdominal pain found to be hypotensive and in acute renal failure on evaluation in ED  Patient went to the ICU was put on a Levophed drip was put on stress dose steroids    He has also chronic alcohol user and was treated for alcohol withdrawal, he was found to be encephalopathic likely secondary to alcohol withdrawal   On 07/12/2018 he was transferred to the general medical floors after his blood pressure found to be stable around 120-140/80  He was weaned off this Serax for alcohol withdrawal and also weaned off the Solu-Cortef for his hypotension  He had a WBC that was found to be increasing but without any clinical manifestations  A repeat CT scan was done to look for intra-abdominal abscesses, but the CT scan showed no changes from the previous scan  A C diff was also done on his ileostomy output, which was found to be negative  The patient was discharged with home PT, and with home care for his SANTA drain, which was not taken out by IR during his hospital course  Due to his elevated WBC, patient needs to follow up in 1 week to have a CBC drawn    He has a follow-up appoint with Dr Issac Juarez on on 07/31/2018         Presenting Problem/History of Present Illness  Principal Problem:    Intra-abdominal abscess ([address] )  Active Problems:    Coronary artery disease involving native coronary artery of native heart    COPD (chronic obstructive pulmonary disease) (HCC)    Alcohol abuse    Tobacco abuse    Alcohol dependence with withdrawal ([address] )    Heart failure, systolic, due to idiopathic cardiomyopathy ([address] )    Moderate protein-calorie malnutrition (HCC)    Anemia    Hypotension  Resolved Problems:    Septic shock (HCC)    Hyponatremia    DEBBY (acute kidney injury) ([address] )    UTI (urinary tract infection)     Crohn's disease  -Prior right hemicolectomy 2005 with subsequent transverse colon perforation status post EDITH, ileocolonic anastomosis, diverting ileostomy 22/4560 complicated by intra-abdominal abscess abscess status post IR drain placement  -Monitor ileostomy/SANTA drain output, IR following up today to remove SANTA drain  -Surgery is following  -the WBC 16 K same as yesterday  -Follow up CBC in one week outpatient  -Pain management-Tylenol, oxycodone p r n   -Home care for SANTA drain     Acute encephalopathy, resolved  -Likely secondary to EtOH withdrawal  -Patient is currently denying use of alcohol at all  -May be multifactorial with stay in ICU  -Currently awake alert and oriented x3  -Plan as below for ETOH withdrawal     Hypotension secondary to dehydration from ileostomy, resolved  -Patient was on Levophed drip in the ICU yesterday on 50 mg q 24 solucortef  -Solucortef dc'd today  -Last blood pressure 119/66, has been around 120/70  -Stable  -If BP climbs to 140s can consider restarting on home lisinopril and metoprolol     Alcohol withdrawal  -Serax dc'd  -Did not require p r n  meds overnight  -Daily folate and thiamine  -Monitor     Chronic anemia  -Hemoglobin 7 6 from 7 0  -Likely AOCD in setting of Crohn's  -Monitor CBC daily, transfuse for hemoglobin less than 7     History of paroxysmal Afib  -Stable  -Continue amiodarone 200 mg p o  daily  -Monitor     CAD, status post stent in RCA in 2012  -Continue aspirin, Lipitor     COPD  -Currently on room air  -Breath sounds normal today  -Continue Spiriva, Xopenex, and albuterol p r n   -Airway clearance protocol  -Being seen by Respiratory     Tobacco abuse  -Patient counseled on tobacco cessation  -On nicotine patch     Protein calorie nutrition  -Last albumin 1 9  -Ensure drinks     Groin rash  -Nystatin ointment b i d      Diagnostic Procedures Performed  Imaging Studies:  Ct Abdomen Pelvis Wo Contrast     Result Date: 7/8/2018  Impression: 1  Irregular tract containing gas adjacent to the ileocolic anastomosis has decreased in size however persistent sinus tract/leak is not excluded  2   Collection associated with the left-sided pigtail catheter is nearly resolved  3   Persistent mesenteric edema and fluid with loculated sub-3 cm collections likely involving the inferior tip of the spleen, near the aortic bifurcation, and in the right hemiabdomen are stable and may represent abscesses   Workstation performed: [id number]      Xr Chest Portable     Result Date: 7/9/2018  Impression: No pneumothorax following central line placement  Left retrocardiac atelectasis or infiltrate  Workstation performed: [id number]      Xr Chest 1 View Portable     Result Date: 7/9/2018  Impression: No acute cardiopulmonary disease  Workstation performed: [id number]      Ir Tube Check     Result Date: 7/9/2018  Impression: Impression: Persistent residual collection with purulent drainage  The catheter functions well  The catheter was left in place  Patient will return in several days for follow-up  Workstation performed: [id number]      Xr Chest Portable Icu     Result Date: 7/10/2018  Impression: No pneumothorax status post right internal jugular central line placement  Persistent small left pleural effusion and atelectasis   Workstation performed: [id number]      Pertinent Labs:       Results from last 7 days  Lab Units 07/16/18  0440 07/15/18  0435 07/14/18  0458 07/13/18  0529 07/12/18  0439   SODIUM mmol/L 141 142 141 140 139   POTASSIUM mmol/L 4 1 4 4 4 3 3 6 3 8   CHLORIDE mmol/L 109* 111* 109* 109* 109*   CO2 mmol/L 25 25 25 23 23   BUN mg/dL 17 17 12 8 5   CREATININE mg/dL 0 88 0 89 0 79 0 91 0 74   CALCIUM mg/dL 8 2* 8 1* 8 1* 8 0* 8 0*   TOTAL PROTEIN g/dL  --   --   --  5 6* 5 6*   BILIRUBIN TOTAL mg/dL  --   --   --  0 15* 0 17*   ALK PHOS U/L  --   --   --  80 84   ALT U/L  --   --   --  7* 7*   AST U/L  --   --   --  7 5   GLUCOSE RANDOM mg/dL 90 103 101 80 135         Therapeutic Procedures Performed  none     Test Results Pending at Discharge: CBC in one week      Medications      Medication List to be Continued at Discharge       Current Discharge Medication List            CONTINUE these medications which have NOT CHANGED     Details   acetaminophen (TYLENOL) 650 mg CR tablet Take 1 tablet (650 mg total) by mouth every 8 (eight) hours as needed for mild pain  Qty: 90 tablet, Refills: 0     Associated Diagnoses: Colitis      Atorvastatin 40 mg albuterol (2 5 mg/3 mL) 0 083 % nebulizer solution Take 3 mL (2 5 mg total) by nebulization every 4 (four) hours as needed for wheezing as needed for wheezing  Qty: 150 mL, Refills: 0     Associated Diagnoses: COPD (chronic obstructive pulmonary disease) (HCC)       amiodarone 200 mg tablet Take 1 tablet (200 mg total) by mouth daily  Qty: 30 tablet, Refills: 0     Associated Diagnoses: Atrial fibrillation with RVR (HCC)       aspirin 81 mg chewable tablet Chew 1 tablet (81 mg total) daily  Qty: 30 tablet, Refills: 0     Associated Diagnoses: Coronary artery disease involving native coronary artery of native heart without angina pectoris       gabapentin (NEURONTIN) 400 mg capsule Take 1 capsule (400 mg total) by mouth 3 (three) times a day  Qty: 90 capsule, Refills: 0     Associated Diagnoses: Cervical radiculopathy       lisinopril (ZESTRIL) 2 5 mg tablet Take 1 tablet (2 5 mg total) by mouth daily  Qty: 30 tablet, Refills: 0     Associated Diagnoses: Essential hypertension       mesalamine (PENTASA) 500 mg CR capsule Pentasa 500 mg capsule,controlled release       metoprolol succinate (TOPROL-XL) 50 mg 24 hr tablet Take 1 tablet (50 mg total) by mouth daily  Qty: 30 tablet, Refills: 0     Associated Diagnoses: Atrial fibrillation with RVR ([facility])       mometasone-formoterol (DULERA) 200-5 MCG/ACT inhaler Dulera 200 mcg-5 mcg/actuation HFA aerosol inhaler       Multiple Vitamins-Minerals (CENTRUM SILVER 50+MEN PO) Centrum Silver Men       Nebulizers (AERONEB GO NEBULIZER HANDSET) MISC by Does not apply route 2 (two) times a day as needed (wheezing)  Qty: 1 each, Refills: 0     Associated Diagnoses: Uncomplicated asthma, unspecified asthma severity, unspecified whether persistent       nicotine (NICODERM CQ) 7 mg/24hr TD 24 hr patch Place 1 patch on the skin every 24 hours  Qty: 28 patch, Refills: 0     Associated Diagnoses: Nicotine dependence       Tiotropium Bromide Monohydrate (SPIRIVA RESPIMAT) 2 5 MCG/ACT AERS Inhale 1 Act (2 5 mcg total) daily  Qty: 1 Inhaler, Refills: 0     Associated Diagnoses: COPD with exacerbation ([facility])       traMADol (ULTRAM) 50 mg tablet Take 100 mg by mouth every 12 (twelve) hours  Refills: 0       VENTOLIN  (90 Base) MCG/ACT inhaler Inhale 2 puffs every 4 (four) hours as needed for wheezing or shortness of breath  Qty: 1 Inhaler, Refills: 0     Associated Diagnoses: COPD (chronic obstructive pulmonary disease) ([facility])       vitamin A 10,000 units capsule Take 3 capsules (30,000 Units total) by mouth daily  Qty: 10 capsule, Refills: 0     Associated Diagnoses: Colitis                   Current Discharge Medication List       START taking these medications     Details   folic acid (FOLVITE) 1 mg tablet Take 1 tablet (1 mg total) by mouth daily  Qty: 30 tablet, Refills: 0     Associated Diagnoses: Alcohol dependence with withdrawal with complication ([facility])       nystatin (MYCOSTATIN) ointment Apply topically 2 (two) times a day  Qty: 30 g, Refills: 0     Associated Diagnoses: Groin rash                   Current Discharge Medication List            STOP taking these medications         oxyCODONE (ROXICODONE) 10 MG TABS Comments:   Reason for Stopping:                    Allergies        Allergies   Allergen Reactions    Other         Brown cloth band aids        Latex Rash            Discharge Diet: cardiac diet  Activity restrictions: no sports  Discharge Condition: fair  Discharged With Lines: yes    Type: SANTA Drain  Reason for line: Draining intraabdominal abscess  When to remove: As per AVN  Line managed by: Home AVN        Discharge Disposition: Home with [address] [facility] [id number] / Family Member Name: Home        Outpatient Follow-Up  yes      Follow up:  Aubree Lugo DO  Date and time: 7/31/18 8 am  Location: [address] [place] [address]  Follow up within next: 7-14 days        Code Status: Level 1 - Full Code     Discharge  Statement   I spent 30 minutes minutes discharging the patient  This time was spent on the day of discharge  I had direct contact with the patient on the day of discharge   Additional documentation is required if more than 30 minutes were spent on discharge                          Cosigned by: Mirta Reese MD at 7/18/2018  1:38 PM

## 2018-08-14 NOTE — CASE MANAGEMENT
As per requested for Authorization #[id number]  Radha Arango MD Resident Attested Internal Medicine  Discharge Summaries Date of Service: 7/17/2018  3:46 PM      Attestation signed by Gio Real MD at 7/18/2018 1:38 PM      Discharge summary by Radha Arango MD reviewed excepted its entirety  Principal diagnosis necessitating admission:  Intra-abdominal abscess secondary to Crohn's disease  Secondary diagnoses and management, as detailed in resident A&P, reviewed and accepted  Pertinent lab studies/radiographic results reviewed and discussed with resident  Key findings SANTA drain in place and functional   Leukocytosis stable at time of discharge  Patient strongly encouraged to avoid all alcohol consumption and tobacco use  Ensure supplements provided for protein calorie malnutrition  Outpatient management of COPD with Spiriva, Xopenex and albuterol as detailed  Deep breathing and pulmonary toileting on a regular basis encouraged  [address] [facility] Discharge Summary - Medical John Gardner 66 y o  male MRN: [id number]     [address] [place] [address] 5 MED SURG/SD Room / Bed: [facility] 505/[facility] [id number] Encounter: [id number]     BRIEF OVERVIEW  Admitting Provider: Miguelangel Mckeon MD  Discharge Provider: Gio Real MD  Primary Care Physician at Discharge: Elin Lewis, [address] [place] [address]  Admission Date: 7/8/2018     Discharge Date: No discharge date for patient encounter  Hospital Course  Patient is a 72-year-old male with history of right hemicolectomy, perforated transverse colon s/p EDITH, ileal colon resection, diverting ileostomy in 53/1606, complicated by intra-abdominal abscess s/p IR drain placed 06/26/2018 who presented on 07/08 with nausea and abdominal pain found to be hypotensive and in acute renal failure on evaluation in ED  Patient went to the ICU was put on a Levophed drip was put on stress dose steroids    He has also chronic alcohol user and was treated for alcohol withdrawal, he was found to be encephalopathic likely secondary to alcohol withdrawal   On 07/12/2018 he was transferred to the general medical floors after his blood pressure found to be stable around 120-140/80  He was weaned off this Serax for alcohol withdrawal and also weaned off the Solu-Cortef for his hypotension  He had a WBC that was found to be increasing but without any clinical manifestations  A repeat CT scan was done to look for intra-abdominal abscesses, but the CT scan showed no changes from the previous scan  A C diff was also done on his ileostomy output, which was found to be negative  The patient was discharged with home PT, and with home care for his SANTA drain, which was not taken out by IR during his hospital course  Due to his elevated WBC, patient needs to follow up in 1 week to have a CBC drawn  He has a follow-up appoint with Dr Jose Rankin on on 07/31/2018    Presenting Problem/History of Present Illness  Principal Problem:    Intra-abdominal abscess (HCC)  Active Problems:    Coronary artery disease involving native coronary artery of native heart    COPD (chronic obstructive pulmonary disease) (HCC)    Alcohol abuse    Tobacco abuse    Alcohol dependence with withdrawal (HCC)    Heart failure, systolic, due to idiopathic cardiomyopathy (HCC)    Moderate protein-calorie malnutrition (HCC)    Anemia    Hypotension  Resolved Problems:    Septic shock (HCC)    Hyponatremia    DEBBY (acute kidney injury) ([facility] [address] )    UTI (urinary tract infection)  Crohn's disease  -Prior right hemicolectomy 2005 with subsequent transverse colon perforation status post EDITH, ileocolonic anastomosis, diverting ileostomy 56/2414 complicated by intra-abdominal abscess abscess status post IR drain placement  -Monitor ileostomy/SANTA drain output, IR following up today to remove SANTA drain  -Surgery is following  -the WBC 16 K same as yesterday  -Follow up CBC in one week outpatient  -Pain management-Tylenol, oxycodone p r n   -Home care for SANTA drain  Acute encephalopathy, resolved  -Likely secondary to EtOH withdrawal  -Patient is currently denying use of alcohol at all  -May be multifactorial with stay in ICU  -Currently awake alert and oriented x3  -Plan as below for ETOH withdrawal  Hypotension secondary to dehydration from ileostomy, resolved  -Patient was on Levophed drip in the ICU yesterday on 50 mg q 24 solucortef  -Solucortef dc'd today  -Last blood pressure 119/66, has been around 120/70  -Stable  -If BP climbs to 140s can consider restarting on home lisinopril and metoprolol  Alcohol withdrawal  -Serax dc'd  -Did not require p r n  meds overnight  -Daily folate and thiamine  -Monitor  Chronic anemia  -Hemoglobin 7 6 from 7 0  -Likely AOCD in setting of Crohn's  -Monitor CBC daily, transfuse for hemoglobin less than 7  History of paroxysmal Afib  -Stable  -Continue amiodarone 200 mg p o  daily  -Monitor  CAD, status post stent in RCA in 2012  -Continue aspirin, Lipitor  COPD  -Currently on room air  -Breath sounds normal today  -Continue Spiriva, Xopenex, and albuterol p r n   -Airway clearance protocol  -Being seen by Respiratory  Tobacco abuse  -Patient counseled on tobacco cessation  -On nicotine patch  Protein calorie nutrition  -Last albumin 1 9  -Ensure drinks  Groin rash  -Nystatin ointment b i d  Diagnostic Procedures Performed  Imaging Studies:  Ct Abdomen Pelvis Wo Contrast  Result Date: 7/8/2018  Impression: 1  Irregular tract containing gas adjacent to the ileocolic anastomosis has decreased in size however persistent sinus tract/leak is not excluded  2   Collection associated with the left-sided pigtail catheter is nearly resolved  3   Persistent mesenteric edema and fluid with loculated sub-3 cm collections likely involving the inferior tip of the spleen, near the aortic bifurcation, and in the right hemiabdomen are stable and may represent abscesses   Workstation performed: [id number]      Xr Chest Portable     Result Date: 7/9/2018  Impression: No pneumothorax following central line placement  Left retrocardiac atelectasis or infiltrate  Workstation performed: [id number]      Xr Chest 1 View Portable     Result Date: 7/9/2018  Impression: No acute cardiopulmonary disease  Workstation performed: [id number]      Ir Tube Check     Result Date: 7/9/2018  Impression: Impression: Persistent residual collection with purulent drainage  The catheter functions well  The catheter was left in place  Patient will return in several days for follow-up  Workstation performed: [id number]      Xr Chest Portable Icu     Result Date: 7/10/2018  Impression: No pneumothorax status post right internal jugular central line placement  Persistent small left pleural effusion and atelectasis   Workstation performed: [id number]      Pertinent Labs:       Results from last 7 days  Lab Units 07/16/18  0440 07/15/18  0435 07/14/18  0458 07/13/18  0529 07/12/18  0439   SODIUM mmol/L 141 142 141 140 139   POTASSIUM mmol/L 4 1 4 4 4 3 3 6 3 8   CHLORIDE mmol/L 109* 111* 109* 109* 109*   CO2 mmol/L 25 25 25 23 23   BUN mg/dL 17 17 12 8 5   CREATININE mg/dL 0 88 0 89 0 79 0 91 0 74   CALCIUM mg/dL 8 2* 8 1* 8 1* 8 0* 8 0*   TOTAL PROTEIN g/dL  --   --   --  5 6* 5 6*   BILIRUBIN TOTAL mg/dL  --   --   --  0 15* 0 17*   ALK PHOS U/L  --   --   --  80 84   ALT U/L  --   --   --  7* 7*   AST U/L  --   --   --  7 5   GLUCOSE RANDOM mg/dL 90 103 101 80 135     Therapeutic Procedures Performed  none  Test Results Pending at Discharge: CBC in one week      Medications      Medication List to be Continued at Discharge       Current Discharge Medication List       CONTINUE these medications which have NOT CHANGED     Details   acetaminophen (TYLENOL) 650 mg CR tablet Take 1 tablet (650 mg total) by mouth every 8 (eight) hours as needed for mild pain  Qty: 90 tablet, Refills: 0     Associated Diagnoses: Colitis    Atorvastatin 40 mg   albuterol (2 5 mg/3 mL) 0 083 % nebulizer solution Take 3 mL (2 5 mg total) by nebulization every 4 (four) hours as needed for wheezing as needed for wheezing  Qty: 150 mL, Refills: 0     Associated Diagnoses: COPD (chronic obstructive pulmonary disease) ([facility])       amiodarone 200 mg tablet Take 1 tablet (200 mg total) by mouth daily  Qty: 30 tablet, Refills: 0     Associated Diagnoses: Atrial fibrillation with RVR ([facility])       aspirin 81 mg chewable tablet Chew 1 tablet (81 mg total) daily  Qty: 30 tablet, Refills: 0     Associated Diagnoses: Coronary artery disease involving native coronary artery of native heart without angina pectoris       gabapentin (NEURONTIN) 400 mg capsule Take 1 capsule (400 mg total) by mouth 3 (three) times a day  Qty: 90 capsule, Refills: 0     Associated Diagnoses: Cervical radiculopathy       lisinopril (ZESTRIL) 2 5 mg tablet Take 1 tablet (2 5 mg total) by mouth daily  Qty: 30 tablet, Refills: 0     Associated Diagnoses: Essential hypertension       mesalamine (PENTASA) 500 mg CR capsule Pentasa 500 mg capsule,controlled release       metoprolol succinate (TOPROL-XL) 50 mg 24 hr tablet Take 1 tablet (50 mg total) by mouth daily  Qty: 30 tablet, Refills: 0     Associated Diagnoses: Atrial fibrillation with RVR ([facility])       mometasone-formoterol (DULERA) 200-5 MCG/ACT inhaler Dulera 200 mcg-5 mcg/actuation HFA aerosol inhaler       Multiple Vitamins-Minerals (CENTRUM SILVER 50+MEN PO) Centrum Silver Men       Nebulizers (AERONEB GO NEBULIZER HANDSET) MISC by Does not apply route 2 (two) times a day as needed (wheezing)  Qty: 1 each, Refills: 0     Associated Diagnoses: Uncomplicated asthma, unspecified asthma severity, unspecified whether persistent       nicotine (NICODERM CQ) 7 mg/24hr TD 24 hr patch Place 1 patch on the skin every 24 hours  Qty: 28 patch, Refills: 0     Associated Diagnoses: Nicotine dependence       Tiotropium Bromide Monohydrate (SPIRIVA RESPIMAT) 2 5 MCG/ACT AERS Inhale 1 Act (2 5 mcg total) daily  Qty: 1 Inhaler, Refills: 0     Associated Diagnoses: COPD with exacerbation ([facility])       traMADol (ULTRAM) 50 mg tablet Take 100 mg by mouth every 12 (twelve) hours  Refills: 0       VENTOLIN  (90 Base) MCG/ACT inhaler Inhale 2 puffs every 4 (four) hours as needed for wheezing or shortness of breath  Qty: 1 Inhaler, Refills: 0     Associated Diagnoses: COPD (chronic obstructive pulmonary disease) ([facility])       vitamin A 10,000 units capsule Take 3 capsules (30,000 Units total) by mouth daily  Qty: 10 capsule, Refills: 0     Associated Diagnoses: Colitis                   Current Discharge Medication List            START taking these medications     Details   folic acid (FOLVITE) 1 mg tablet Take 1 tablet (1 mg total) by mouth daily  Qty: 30 tablet, Refills: 0     Associated Diagnoses: Alcohol dependence with withdrawal with complication ([facility])       nystatin (MYCOSTATIN) ointment Apply topically 2 (two) times a day  Qty: 30 g, Refills: 0     Associated Diagnoses: Groin rash                   Current Discharge Medication List            STOP taking these medications         oxyCODONE (ROXICODONE) 10 MG TABS Comments:   Reason for Stopping:                    Allergies        Allergies   Allergen Reactions    Other         Brown cloth band aids        Latex Rash      Discharge Diet: cardiac diet  Activity restrictions: no sports  Discharge Condition: fair  Discharged With Lines: yes    Type: SANTA Drain  Reason for line: Draining intraabdominal abscess  When to remove: As per AVN  Line managed by: Home AVN        Discharge Disposition: Home with [address] [facility] [id number] Family Member Name: Home        Outpatient Follow-Up  yes      Follow up:  Erin Murphy DO  Date and time: 7/31/18 8 am  Location: [address] [place] [address]  Follow up within next: 7-14 days        Code Status: Level 1 - Full Code     Discharge  Statement   I spent 30 minutes minutes discharging the patient  This time was spent on the day of discharge  I had direct contact with the patient on the day of discharge   Additional documentation is required if more than 30 minutes were spent on discharge                          Cosigned by: Sandy Costa MD at 7/18/2018  1:38 PM

## 2018-08-21 ENCOUNTER — PATIENT OUTREACH (OUTPATIENT)
Dept: INTERNAL MEDICINE CLINIC | Facility: CLINIC | Age: 58
End: 2018-08-21

## 2018-08-21 ENCOUNTER — TELEPHONE (OUTPATIENT)
Dept: INTERNAL MEDICINE CLINIC | Facility: CLINIC | Age: 58
End: 2018-08-21

## 2018-08-21 DIAGNOSIS — F10.239 ALCOHOL DEPENDENCE WITH WITHDRAWAL WITH COMPLICATION (HCC): ICD-10-CM

## 2018-08-21 RX ORDER — FOLIC ACID 1 MG/1
1 TABLET ORAL DAILY
Qty: 30 TABLET | Refills: 0 | Status: SHIPPED | OUTPATIENT
Start: 2018-08-21 | End: 2018-09-21 | Stop reason: SDUPTHER

## 2018-08-21 NOTE — TELEPHONE ENCOUNTER
Patient wants to clarify whether or not he should continue taking Folic Acid, Metoprolol, Amoxicillin

## 2018-08-21 NOTE — Clinical Note
RN Outpatient Nurse Care Management Update  Dr Killian Rodgers you will be seeing pt on 9/10 for clearance to have ileostomy reversed  [facility]

## 2018-08-21 NOTE — TELEPHONE ENCOUNTER
According to his discharge AVS from the hospital, should be taking the folic acid, and amox, but not metoprolol  Thanks   Deana Christian

## 2018-08-21 NOTE — TELEPHONE ENCOUNTER
I called and spoke with pt  Pt did finish abx amoxicillin but wanted to know if he needed to continue this medication  I made pt aware he completed his abx and does not need to continue it and no need for refill at this time  Pt made aware he should not be taking metoprolol  Pt reports he has not been taking it but pharmacy called stating they had a refill waiting for him to   I did confirm with pt he is taking Lisinopril daily for his BP  Pt made aware he should be taking folic acid  Pt states he will need refill sent to pharmacy

## 2018-08-21 NOTE — PROGRESS NOTES
Called and spoke with pt  Pt reports he is feeling well and still trying to manage on his own at home but does have the help of his daughter and grandchildren  Pt reports he did f/u with Dr Juventino Roth who wants to reverse his ileostomy and that is why pt is f/u with PCP on 9/10 to get clearance to have reversal done  Pt reports his daughter is helping with the care of his ileostomy and changing bag  [facility] is out seeing pt and nursing was coming 3x a week and is now down to once a week and physical therapy is out  Pt reports PT did get him outside his house last week  Pt states his goal is to be able to do steps as only bathroom is on 2nd floor and has been using commode up to this point  Pt encouraged to express this to PT when they come to see him next  Pt is using walker to ambulate  Pt is aware of cardiology apt on Friday 8/24 @ 4pm  Pt reports his grandson will be taking him to this appt  Magalie Shahid application was completed when he was in our office  Pt reports application needed more info and got returned to him  He did complete it and sent copy of his 's license in with it and is waiting to do in person alexa  Pt did call office earlier today about questions regarding medication  AMALIA Villa did respond and I made pt  aware (see telephone encounter in EPIC from 8/21/18)  Pt reports only needs folic acid at this point and will call if he needs any additional refills  Pt reports is smoking 2-3 cigars a day (no cigarettes) and reports he has been in contact with someone from his insurance and have discussed about smoking cessation  Pt reports they were going to get back to him about what would be covered by insurance for smoking cessation and pt reports and then will decide what he would like to do then  Pt denies any complaints at this time and does not have any further questions  Pt has my contact # and is agreeable for further outreach

## 2018-08-23 ENCOUNTER — PATIENT OUTREACH (OUTPATIENT)
Dept: INTERNAL MEDICINE CLINIC | Facility: CLINIC | Age: 58
End: 2018-08-23

## 2018-08-23 NOTE — PROGRESS NOTES
Pt called me to report he received a call from [facility] and will be going for his in person eval  and that they are unable to see him before that  Pt's temporary transportation through [facility] will  the end of August  Pt had preop clearance apt to have ileostomy reversed scheduled for 9/10 with PCP  Pt needs apt cancelled and will call to r/s once he has [facility] transportation  I did ask if I could r/s apt for a date and time for when his grandson and/or daughter could bring but said that was not an option

## 2018-08-23 NOTE — PROGRESS NOTES
Called [facility] dispatch to arrange [employer] for PCP apt on 9/10 @ 2:30 pm with Dr Alison Mcdaniel  I was told it is too soon to set this up and they advised I call 3-4 days prior to set up ride  I will set reminder in [place] to do this

## 2018-08-23 NOTE — PROGRESS NOTES
I remembered pt has [employer] waiver signed and still has unused rides  Pt needs to get to apt on 9/10 with PCP for clearance to have ileostomy reversed  I called and spoke with pt and made him aware of this and is willing to use [employer] to get to apt on 9/10  Pt is aware I would call and setup ride

## 2018-08-24 ENCOUNTER — OFFICE VISIT (OUTPATIENT)
Dept: CARDIOLOGY CLINIC | Facility: CLINIC | Age: 58
End: 2018-08-24
Payer: COMMERCIAL

## 2018-08-24 VITALS
DIASTOLIC BLOOD PRESSURE: 72 MMHG | BODY MASS INDEX: 20.75 KG/M2 | OXYGEN SATURATION: 98 % | SYSTOLIC BLOOD PRESSURE: 112 MMHG | WEIGHT: 129.1 LBS | HEIGHT: 66 IN | HEART RATE: 100 BPM

## 2018-08-24 DIAGNOSIS — I25.10 CORONARY ARTERY DISEASE INVOLVING NATIVE CORONARY ARTERY OF NATIVE HEART WITHOUT ANGINA PECTORIS: Primary | ICD-10-CM

## 2018-08-24 DIAGNOSIS — I48.0 PAROXYSMAL ATRIAL FIBRILLATION (HCC): ICD-10-CM

## 2018-08-24 DIAGNOSIS — Z95.5 PRESENCE OF DRUG-ELUTING STENT IN RIGHT CORONARY ARTERY: ICD-10-CM

## 2018-08-24 DIAGNOSIS — E78.5 DYSLIPIDEMIA: ICD-10-CM

## 2018-08-24 PROCEDURE — 99214 OFFICE O/P EST MOD 30 MIN: CPT | Performed by: INTERNAL MEDICINE

## 2018-08-24 NOTE — PROGRESS NOTES
Cardiology Follow Up    Mervin Greenberg  1960  [id number]  [address] [facility] [address] [facility] [address] [phone]    1  Coronary artery disease involving native coronary artery of native heart without angina pectoris     2  Presence of drug-eluting stent in right coronary artery     3  Dyslipidemia     4  Paroxysmal atrial fibrillation (HCC)         Interval History:     Mr Lucila Fernandez comes in for follow-up given his history of coronary artery disease  and paroxysmal atrial fibrillation  He has a history of a myocardial infarct in 2012 in which his received a stent to the RCA  Last year he presented with a pleuritic chest pain made worse with inspiration, however there were ST elevations and therefore he was taken to the cardiac Cath Lab  There he had no changes in his RCA stent was open  Given his classic pleuritic type symptoms, he was then treated for pericarditis  We increased his aspirin and added colchicine and he responded well  His echocardiogram was unremarkable  Since I last saw him Mr Lucila Fernandez was in the hospital with colitis and perforated bowel  He required surgery and a colostomy  In the setting of this he went into atrial fibrillation  He did convert and was placed on amiodarone  Anticoagulation was not started  He has remained in sinus rhythm since  He will be having surgery next month to what sounds like reverse his ostomy  He went through an extensive hospitalization, as rehabilitating and deconditioned  Overall Mr Lucila Fernandez is felt well  He denies any chest pain or palpitations  He does have chronic shortness of breath associated with COPD, but this has not changed at all  He denies any orthopnea, PND or any symptoms of congestive heart failure  He denies any lightheadedness, palpitations or syncope  He is tolerating his medications        Patient Active Problem List   Diagnosis  Crohn disease ([place] [address] )    Spinal stenosis of cervical region    Hypertension    Pericarditis    Coronary artery disease involving native coronary artery of native heart without angina pectoris    Leukocytosis    Asthma    Atherosclerosis of coronary artery    COPD [facility]ch[facility] obstructive [facility] disease) [facility]    Dyslipidemia    Alcohol abuse    Cigarette nicotine dependence without complication    Alcohol dependence with withdrawal ([facility]    Acute respiratory insufficiency    Agitation    Heart failure, systolic, due to idiopathic cardiomyopathy ([place] [address] )    Ileostomy in place ([place] [address] )    Moderate protein-calorie malnutrition ([facility])    Anemia    Hypocalcemia    Perforation of colon (HCC)    Hypotension    Intra-abdominal abscess ([facility])    DEBBY (acute kidney injury) ([place] [address] )    Abdominal pain    Ambulatory dysfunction    Presence of drug-eluting stent in right coronary artery    Paroxysmal atrial fibrillation ([facility])     Past Medical History:   Diagnosis Date    Anxiety     Asthma     Cardiac disease     Chest pain     Colitis     Colon polyps     COPD [facility]chronic obstructive pulmonary disease) [facility]     Coronary artery disease     Diverticulitis     Esophageal reflux     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HCC)     Hyperlipidemia     Hypertension     IBD (inflammatory bowel disease)     Myocardial infarction ([place] [address] )     Old myocardial infarction     Perforation of colon ([facility])     Type 2 diabetes, diet controlled ([place] [address] )     Ulcerative colitis ([place] [address] )      Social History     Social History    Marital status:      Spouse name: N/A    Number of children: N/A    Years of education: N/A     Occupational History    Not on file       Social History Main Topics    Smoking status: Current Every Day Smoker     Packs/day: 0 00     Years: 5 00     Types: Cigars    Smokeless tobacco: Never Used      Comment: 3 cigars per day    Alcohol use No      Comment: pt "quit about 4 months ago"    Drug use: No    Sexual activity: Yes     Partners: Female     Birth control/ protection: None     Other Topics Concern    Not on file     Social History Narrative    No narrative on file      Family History   Problem Relation Age of Onset    Coronary artery disease Father     Crohn's disease Father     Stroke Father     Diabetes Family      Past Surgical History:   Procedure Laterality Date    ANGIOPLASTY      COLON SURGERY      COLONOSCOPY N/A 10/24/2016    Procedure: COLONOSCOPY;  Surgeon: Justa Stacy MD;  Location: [facility]; Service:     CORONARY ANGIOPLASTY WITH STENT PLACEMENT      ESOPHAGOGASTRODUODENOSCOPY N/A 10/24/2016    Procedure: ESOPHAGOGASTRODUODENOSCOPY (EGD); Surgeon: Justa Stacy MD;  Location: [facility]; Service:     EXPLORATORY LAPAROTOMY W/ BOWEL RESECTION N/A 5/22/2018    Procedure: EXPLORATORY LAPAROTOMY, ILIOCOLECTOMY, ILIOCOLONIC ANASTAMOSIS, LOOP ILIOSTOMY, REPAIR OF SEROSAL TEAR, EXTENSIVE LYSIS OF ADHESIONS, WOUND VAC PLACEMENT;  Surgeon: Justa Stacy MD;  Location: [facility];  Service: Colorectal    HEMICOLECTOMY      OTHER SURGICAL HISTORY      stent indications acute myocardial infarction    SD COLONOSCOPY FLX DX W/COLLJ SPEC WHEN PFRMD N/A 2/6/2018    Procedure: COLONOSCOPY;  Surgeon: Justa Stacy MD;  Location: [facility];   Service: Colorectal    TONSILLECTOMY         Current Outpatient Prescriptions:     albuterol (2 5 mg/3 mL) 0 083 % nebulizer solution, Take 3 mL (2 5 mg total) by nebulization every 4 (four) hours as needed for wheezing as needed for wheezing, Disp: 150 mL, Rfl: 0    amiodarone 200 mg tablet, Take 1 tablet (200 mg total) by mouth daily, Disp: 30 tablet, Rfl: 3    aspirin 81 mg chewable tablet, Chew 1 tablet (81 mg total) daily, Disp: 30 tablet, Rfl: 0    atorvastatin (LIPITOR) 40 mg tablet, Take 1 tablet (40 mg total) by mouth daily with dinner for 30 days, Disp: 30 tablet, Rfl: 0    folic acid (FOLVITE) 1 mg tablet, Take 1 tablet (1 mg total) by mouth daily, Disp: 30 tablet, Rfl: 0    gabapentin (NEURONTIN) 400 mg capsule, Take 1 capsule (400 mg total) by mouth 3 (three) times a day, Disp: 90 capsule, Rfl: 0    lisinopril (ZESTRIL) 2 5 mg tablet, Take 1 tablet (2 5 mg total) by mouth daily, Disp: 30 tablet, Rfl: 0    mesalamine (PENTASA) 500 mg CR capsule, Take 1 capsule (500 mg total) by mouth 3 (three) times a day, Disp: , Rfl: 0    Multiple Vitamins-Minerals (CENTRUM SILVER 50+MEN PO), Centrum Silver Men, Disp: , Rfl:     Nebulizers (AERONEB GO NEBULIZER HANDSET) MISC, by Does not apply route 2 (two) times a day as needed (wheezing), Disp: 1 each, Rfl: 0    nystatin (MYCOSTATIN) ointment, Apply topically 2 (two) times a day, Disp: 30 g, Rfl: 0    thiamine 100 MG tablet, Take 1 tablet (100 mg total) by mouth daily, Disp: 30 tablet, Rfl: 3    Tiotropium Bromide Monohydrate (SPIRIVA RESPIMAT) 2 5 MCG/ACT AERS, Inhale 1 Act (2 5 mcg total) daily, Disp: 1 Inhaler, Rfl: 0    traMADol (ULTRAM) 50 mg tablet, Take 100 mg by mouth every 12 (twelve) hours, Disp: , Rfl: 0    VENTOLIN  (90 Base) MCG/ACT inhaler, Inhale 2 puffs every 4 (four) hours as needed for wheezing or shortness of breath, Disp: 1 Inhaler, Rfl: 0    vitamin A 10,000 units capsule, Take 3 capsules (30,000 Units total) by mouth daily, Disp: 10 capsule, Rfl: 0  Allergies   Allergen Reactions    Medical Tape     Other      Brown cloth band aids       Latex Rash       Labs:  Lab Results   Component Value Date     (H) 08/09/2018     05/29/2015    K 3 7 08/09/2018    K 4 4 05/29/2015     (H) 08/09/2018     05/29/2015    CO2 20 (L) 08/09/2018    CO2 30 05/29/2015    BUN 14 08/09/2018    BUN 10 05/29/2015    CREATININE 1 04 08/09/2018    CREATININE 0 87 05/29/2015    GLUCOSE 89 08/09/2018    GLUCOSE 92 07/08/2018    GLUCOSE 122 05/29/2015    CALCIUM 7 7 (L) 08/09/2018    CALCIUM 9 1 05/29/2015     Lab Results   Component Value Date    WBC 8 56 08/09/2018    WBC 9 06 05/28/2015    HGB 7 3 (L) 08/09/2018    HGB 15 2 05/28/2015    HCT 24 5 (L) 08/09/2018    HCT 45 9 05/28/2015    MCV 92 08/09/2018    MCV 95 05/28/2015     08/09/2018     05/28/2015     Lab Results   Component Value Date    TRIG 171 (H) 05/20/2018    HDL 67 (H) 05/20/2018     Imaging: Ct Abdomen Pelvis Wo Contrast    Result Date: 8/7/2018  Narrative: CT ABDOMEN AND PELVIS WITHOUT IV CONTRAST INDICATION:   abdominal pain  COMPARISON:  July 15, 2018 TECHNIQUE:  CT examination of the abdomen and pelvis was performed without intravenous contrast   Axial, sagittal, and coronal 2D reformatted images were created from the source data and submitted for interpretation  Radiation dose length product (DLP) for this visit:  301 21 mGy-cm   This examination, like all CT scans performed in the [facility], was performed utilizing techniques to minimize radiation dose exposure, including the use of iterative  reconstruction and automated exposure control  Enteric contrast was not administered  FINDINGS: Study is limited secondary to lack of IV contrast  ABDOMEN LOWER CHEST:  There is a 3 mm nodule seen within the right lower lobe  LIVER/BILIARY TREE:  Unremarkable  GALLBLADDER:  No calcified gallstones  No pericholecystic inflammatory change  SPLEEN:  Unremarkable  PANCREAS:  Unremarkable  ADRENAL GLANDS:  Unremarkable  KIDNEYS/URETERS: There is a hypodense lesion with a calcification in the superior pole of the left kidney  There is no evidence of hydronephrosis  STOMACH AND BOWEL:  Prior right hemicolectomy is seen  The remaining colon is unremarkable  There is a diverting ileostomy in the right lower quadrant  APPENDIX:  Absent ABDOMINOPELVIC CAVITY:  There has been interval removal of the pigtail catheter  Inflammatory changes within the abdomen and pelvis is visualized  The previously visualized the inferior tip of liver is unchanged compared to prior study  Unchanged fluid collection adjacent to the right common iliac artery is noted  VESSELS:  Atherosclerotic changes are present  No evidence of aneurysm  PELVIS REPRODUCTIVE ORGANS:  Unremarkable for patient's age  URINARY BLADDER:  Diffuse thickening of the urinary bladder wall ABDOMINAL WALL/INGUINAL REGIONS:  Anterior abdominal postoperative defect is seen  OSSEOUS STRUCTURES:  No acute fracture or destructive osseous lesion  Impression: Inflammatory changes in the abdomen and pelvis with small fluid collections unchanged from prior study  Workstation performed: [id number]     Xr Chest 2 Views    Result Date: 8/7/2018  Narrative: CHEST INDICATION:   abdominal pain, hypotension  COMPARISON:  7/9/2018 EXAM PERFORMED/VIEWS:  XR CHEST PA & LATERAL FINDINGS: Cardiomediastinal silhouette appears stable  Minor left basilar subsegmental atelectasis  No pneumothorax or pleural effusion  Osseous structures appear within normal limits for patient age  Impression: Minor left basilar subsegmental atelectasis  Workstation performed: [id number]     Ir Tube Check    Result Date: 8/2/2018  Narrative: Tube check Clinical History: No output Fluoroscopy time:  2 MINUTES ( 2 mGy ) Contrast: 5 mL of iohexol (OMNIPAQUE) Images: 2 Procedure: A  view demonstrates a left lower quadrant pigtail catheter  Contrast injection shows no residual abscess cavity with drainage to the skin  The catheter was transected and removed without incident  The patient tolerated the procedure well and left the department in stable condition  Impression: Impression: Resolution of the abscess cavity with removal of the drainage catheter  Workstation performed: [id number]       Review of Systems:  Review of Systems   Constitutional: Positive for fatigue  HENT: Negative  Eyes: Negative  Respiratory: Positive for shortness of breath  Cardiovascular: Negative  Gastrointestinal: Negative  Musculoskeletal: Negative      Skin: Negative  Allergic/Immunologic: Negative  Neurological: Negative  Hematological: Negative  Psychiatric/Behavioral: Negative  All other systems reviewed and are negative  Physical Exam:  Physical Exam   Constitutional: He is oriented to person, place, and time  He appears well-developed and well-nourished  HENT:   Head: Normocephalic and atraumatic  Eyes: EOM are normal  Pupils are equal, round, and reactive to light  Right eye exhibits no discharge  Left eye exhibits no discharge  No scleral icterus  Neck: Normal range of motion  Neck supple  No JVD present  No thyromegaly present  Cardiovascular: Normal rate, regular rhythm, S1 normal, S2 normal, normal heart sounds, intact distal pulses and normal pulses  No extrasystoles are present  Exam reveals no gallop and no friction rub  No murmur heard  Pulmonary/Chest: Effort normal  No respiratory distress  He has decreased breath sounds  He has no wheezes  He has no rales  Abdominal: Soft  Bowel sounds are normal  He exhibits no distension  There is no tenderness  Musculoskeletal: Normal range of motion  He exhibits no edema, tenderness or deformity  Neurological: He is alert and oriented to person, place, and time  No cranial nerve deficit  Skin: Skin is warm and dry  No rash noted  Psychiatric: He has a normal mood and affect  Judgment and thought content normal    Nursing note and vitals reviewed  Discussion/Summary:    1  Paroxysmal atrial fibrillation -  This occur during an acute hospitalization for colitis requiring bowel surgery and acute respiratory failure  He returned to sinus rhythm was placed on amiodarone  For now we will leave him on this, since he is going to have another surgery next month to what sounds like reversing the ostomy  We will have back in 4 months and discuss coming off the amiodarone at that time      2   Coronary artery disease -  He had a prior history of a myocardial infarction in 2012  He had a drug-eluting stent to the  A  He is without symptoms of angina  He will remain on the same medical therapy  No changes were made  3   History of pericarditis -  Resolved    4  Dyslipidemia -  He will remain on the same dose of atorvastatin  We will continue to follow blood work periodically  Counseling / Coordination of Care  Total floor / unit time spent today 25 minutes  Greater than 50% of total time was spent with the patient and / or family counseling and / or coordination of care

## 2018-08-29 ENCOUNTER — TELEPHONE (OUTPATIENT)
Dept: INTERNAL MEDICINE CLINIC | Facility: CLINIC | Age: 58
End: 2018-08-29

## 2018-08-30 ENCOUNTER — APPOINTMENT (EMERGENCY)
Dept: RADIOLOGY | Facility: HOSPITAL | Age: 58
DRG: 253 | End: 2018-08-30
Payer: COMMERCIAL

## 2018-08-30 ENCOUNTER — HOSPITAL ENCOUNTER (INPATIENT)
Facility: HOSPITAL | Age: 58
LOS: 3 days | Discharge: HOME HEALTH | DRG: 253 | End: 2018-09-03
Attending: EMERGENCY MEDICINE | Admitting: INTERNAL MEDICINE
Payer: COMMERCIAL

## 2018-08-30 DIAGNOSIS — N17.9 AKI (ACUTE KIDNEY INJURY) (HCC): Primary | ICD-10-CM

## 2018-08-30 DIAGNOSIS — I42.9 HEART FAILURE, SYSTOLIC, DUE TO IDIOPATHIC CARDIOMYOPATHY (HCC): ICD-10-CM

## 2018-08-30 DIAGNOSIS — I25.10 CORONARY ARTERY DISEASE INVOLVING NATIVE CORONARY ARTERY OF NATIVE HEART WITHOUT ANGINA PECTORIS: ICD-10-CM

## 2018-08-30 DIAGNOSIS — R11.2 NAUSEA AND VOMITING: ICD-10-CM

## 2018-08-30 DIAGNOSIS — I48.0 PAROXYSMAL ATRIAL FIBRILLATION (HCC): ICD-10-CM

## 2018-08-30 DIAGNOSIS — K92.0 HEMATEMESIS WITH NAUSEA: ICD-10-CM

## 2018-08-30 DIAGNOSIS — E86.0 DEHYDRATION: ICD-10-CM

## 2018-08-30 DIAGNOSIS — I95.89 OTHER SPECIFIED HYPOTENSION: ICD-10-CM

## 2018-08-30 DIAGNOSIS — I50.20 HEART FAILURE, SYSTOLIC, DUE TO IDIOPATHIC CARDIOMYOPATHY (HCC): ICD-10-CM

## 2018-08-30 PROBLEM — E83.52 HYPERCALCEMIA: Status: ACTIVE | Noted: 2018-08-30

## 2018-08-30 LAB
ALBUMIN SERPL BCP-MCNC: 4.1 G/DL (ref 3.5–5)
ALP SERPL-CCNC: 133 U/L (ref 46–116)
ALT SERPL W P-5'-P-CCNC: 21 U/L (ref 12–78)
ANION GAP SERPL CALCULATED.3IONS-SCNC: 15 MMOL/L (ref 4–13)
AST SERPL W P-5'-P-CCNC: 10 U/L (ref 5–45)
BASOPHILS # BLD AUTO: 0.07 THOUSANDS/ΜL (ref 0–0.1)
BASOPHILS NFR BLD AUTO: 0 % (ref 0–1)
BILIRUB SERPL-MCNC: 0.23 MG/DL (ref 0.2–1)
BUN SERPL-MCNC: 51 MG/DL (ref 5–25)
CALCIUM SERPL-MCNC: 10.8 MG/DL (ref 8.3–10.1)
CHLORIDE SERPL-SCNC: 95 MMOL/L (ref 100–108)
CO2 SERPL-SCNC: 20 MMOL/L (ref 21–32)
CREAT SERPL-MCNC: 5.04 MG/DL (ref 0.6–1.3)
EOSINOPHIL # BLD AUTO: 0.04 THOUSAND/ΜL (ref 0–0.61)
EOSINOPHIL NFR BLD AUTO: 0 % (ref 0–6)
ERYTHROCYTE [DISTWIDTH] IN BLOOD BY AUTOMATED COUNT: 16.3 % (ref 11.6–15.1)
GFR SERPL CREATININE-BSD FRML MDRD: 12 ML/MIN/1.73SQ M
GLUCOSE SERPL-MCNC: 163 MG/DL (ref 65–140)
HCT VFR BLD AUTO: 39.3 % (ref 36.5–49.3)
HGB BLD-MCNC: 12.7 G/DL (ref 12–17)
IMM GRANULOCYTES # BLD AUTO: 0.16 THOUSAND/UL (ref 0–0.2)
IMM GRANULOCYTES NFR BLD AUTO: 1 % (ref 0–2)
LIPASE SERPL-CCNC: 233 U/L (ref 73–393)
LYMPHOCYTES # BLD AUTO: 3.52 THOUSANDS/ΜL (ref 0.6–4.47)
LYMPHOCYTES NFR BLD AUTO: 15 % (ref 14–44)
MCH RBC QN AUTO: 27.3 PG (ref 26.8–34.3)
MCHC RBC AUTO-ENTMCNC: 32.3 G/DL (ref 31.4–37.4)
MCV RBC AUTO: 84 FL (ref 82–98)
MONOCYTES # BLD AUTO: 1.68 THOUSAND/ΜL (ref 0.17–1.22)
MONOCYTES NFR BLD AUTO: 7 % (ref 4–12)
NEUTROPHILS # BLD AUTO: 18.61 THOUSANDS/ΜL (ref 1.85–7.62)
NEUTS SEG NFR BLD AUTO: 77 % (ref 43–75)
NRBC BLD AUTO-RTO: 0 /100 WBCS
PLATELET # BLD AUTO: 512 THOUSANDS/UL (ref 149–390)
PMV BLD AUTO: 10.4 FL (ref 8.9–12.7)
POTASSIUM SERPL-SCNC: 4.7 MMOL/L (ref 3.5–5.3)
PROT SERPL-MCNC: 9.7 G/DL (ref 6.4–8.2)
RBC # BLD AUTO: 4.66 MILLION/UL (ref 3.88–5.62)
SODIUM SERPL-SCNC: 130 MMOL/L (ref 136–145)
WBC # BLD AUTO: 24.08 THOUSAND/UL (ref 4.31–10.16)

## 2018-08-30 PROCEDURE — 74176 CT ABD & PELVIS W/O CONTRAST: CPT

## 2018-08-30 PROCEDURE — C9113 INJ PANTOPRAZOLE SODIUM, VIA: HCPCS | Performed by: EMERGENCY MEDICINE

## 2018-08-30 PROCEDURE — 36415 COLL VENOUS BLD VENIPUNCTURE: CPT | Performed by: EMERGENCY MEDICINE

## 2018-08-30 PROCEDURE — 96374 THER/PROPH/DIAG INJ IV PUSH: CPT

## 2018-08-30 PROCEDURE — 93005 ELECTROCARDIOGRAM TRACING: CPT

## 2018-08-30 PROCEDURE — 94640 AIRWAY INHALATION TREATMENT: CPT

## 2018-08-30 PROCEDURE — 96361 HYDRATE IV INFUSION ADD-ON: CPT

## 2018-08-30 PROCEDURE — 80053 COMPREHEN METABOLIC PANEL: CPT | Performed by: EMERGENCY MEDICINE

## 2018-08-30 PROCEDURE — 83690 ASSAY OF LIPASE: CPT | Performed by: EMERGENCY MEDICINE

## 2018-08-30 PROCEDURE — 85025 COMPLETE CBC W/AUTO DIFF WBC: CPT | Performed by: EMERGENCY MEDICINE

## 2018-08-30 PROCEDURE — 96375 TX/PRO/DX INJ NEW DRUG ADDON: CPT

## 2018-08-30 RX ORDER — OXYCODONE HYDROCHLORIDE 10 MG/1
10 TABLET ORAL EVERY 4 HOURS PRN
Status: DISCONTINUED | OUTPATIENT
Start: 2018-08-30 | End: 2018-09-03 | Stop reason: HOSPADM

## 2018-08-30 RX ORDER — PANTOPRAZOLE SODIUM 40 MG/1
40 INJECTION, POWDER, FOR SOLUTION INTRAVENOUS EVERY 12 HOURS SCHEDULED
Status: DISCONTINUED | OUTPATIENT
Start: 2018-08-30 | End: 2018-08-30

## 2018-08-30 RX ORDER — ONDANSETRON 2 MG/ML
4 INJECTION INTRAMUSCULAR; INTRAVENOUS ONCE
Status: COMPLETED | OUTPATIENT
Start: 2018-08-30 | End: 2018-08-30

## 2018-08-30 RX ORDER — AMIODARONE HYDROCHLORIDE 200 MG/1
200 TABLET ORAL DAILY
Status: DISCONTINUED | OUTPATIENT
Start: 2018-08-31 | End: 2018-09-03 | Stop reason: HOSPADM

## 2018-08-30 RX ORDER — ALBUTEROL SULFATE 2.5 MG/3ML
2.5 SOLUTION RESPIRATORY (INHALATION) EVERY 4 HOURS PRN
Status: DISCONTINUED | OUTPATIENT
Start: 2018-08-30 | End: 2018-09-03 | Stop reason: HOSPADM

## 2018-08-30 RX ORDER — SODIUM CHLORIDE 9 MG/ML
125 INJECTION, SOLUTION INTRAVENOUS CONTINUOUS
Status: DISCONTINUED | OUTPATIENT
Start: 2018-08-30 | End: 2018-08-31

## 2018-08-30 RX ORDER — ONDANSETRON 2 MG/ML
4 INJECTION INTRAMUSCULAR; INTRAVENOUS EVERY 4 HOURS PRN
Status: DISCONTINUED | OUTPATIENT
Start: 2018-08-30 | End: 2018-09-03 | Stop reason: HOSPADM

## 2018-08-30 RX ORDER — OXYCODONE HYDROCHLORIDE 5 MG/1
5 TABLET ORAL EVERY 4 HOURS PRN
Status: DISCONTINUED | OUTPATIENT
Start: 2018-08-30 | End: 2018-09-03 | Stop reason: HOSPADM

## 2018-08-30 RX ORDER — NICOTINE 21 MG/24HR
14 PATCH, TRANSDERMAL 24 HOURS TRANSDERMAL ONCE
Status: COMPLETED | OUTPATIENT
Start: 2018-08-30 | End: 2018-08-31

## 2018-08-30 RX ORDER — PANTOPRAZOLE SODIUM 40 MG/1
40 INJECTION, POWDER, FOR SOLUTION INTRAVENOUS EVERY 12 HOURS SCHEDULED
Status: DISCONTINUED | OUTPATIENT
Start: 2018-08-31 | End: 2018-09-03

## 2018-08-30 RX ORDER — HEPARIN SODIUM 5000 [USP'U]/ML
5000 INJECTION, SOLUTION INTRAVENOUS; SUBCUTANEOUS EVERY 8 HOURS SCHEDULED
Status: DISCONTINUED | OUTPATIENT
Start: 2018-08-30 | End: 2018-08-30

## 2018-08-30 RX ORDER — ATORVASTATIN CALCIUM 40 MG/1
40 TABLET, FILM COATED ORAL
Status: DISCONTINUED | OUTPATIENT
Start: 2018-08-31 | End: 2018-09-03 | Stop reason: HOSPADM

## 2018-08-30 RX ORDER — PANTOPRAZOLE SODIUM 40 MG/1
40 INJECTION, POWDER, FOR SOLUTION INTRAVENOUS
Status: DISCONTINUED | OUTPATIENT
Start: 2018-08-31 | End: 2018-08-30

## 2018-08-30 RX ORDER — ALBUTEROL SULFATE 90 UG/1
2 AEROSOL, METERED RESPIRATORY (INHALATION) EVERY 4 HOURS PRN
Status: DISCONTINUED | OUTPATIENT
Start: 2018-08-30 | End: 2018-09-03 | Stop reason: HOSPADM

## 2018-08-30 RX ORDER — NYSTATIN 100000 U/G
OINTMENT TOPICAL 2 TIMES DAILY
Status: DISCONTINUED | OUTPATIENT
Start: 2018-08-31 | End: 2018-09-03 | Stop reason: HOSPADM

## 2018-08-30 RX ORDER — NICOTINE 21 MG/24HR
1 PATCH, TRANSDERMAL 24 HOURS TRANSDERMAL DAILY
Status: DISCONTINUED | OUTPATIENT
Start: 2018-08-31 | End: 2018-09-03 | Stop reason: HOSPADM

## 2018-08-30 RX ADMIN — SODIUM CHLORIDE 1000 ML: 0.9 INJECTION, SOLUTION INTRAVENOUS at 18:47

## 2018-08-30 RX ADMIN — SODIUM CHLORIDE 1000 ML: 0.9 INJECTION, SOLUTION INTRAVENOUS at 19:49

## 2018-08-30 RX ADMIN — ONDANSETRON 4 MG: 2 INJECTION INTRAMUSCULAR; INTRAVENOUS at 18:53

## 2018-08-30 RX ADMIN — SODIUM CHLORIDE 80 MG: 9 INJECTION, SOLUTION INTRAVENOUS at 21:15

## 2018-08-30 RX ADMIN — SODIUM CHLORIDE 125 ML/HR: 0.9 INJECTION, SOLUTION INTRAVENOUS at 23:16

## 2018-08-30 RX ADMIN — IPRATROPIUM BROMIDE 0.5 MG: 0.5 SOLUTION RESPIRATORY (INHALATION) at 18:54

## 2018-08-30 RX ADMIN — ALBUTEROL SULFATE 5 MG: 2.5 SOLUTION RESPIRATORY (INHALATION) at 18:54

## 2018-08-30 RX ADMIN — NICOTINE 14 MG: 14 PATCH, EXTENDED RELEASE TRANSDERMAL at 21:24

## 2018-08-30 NOTE — ED NOTES
Pt reports feeling slightly better still has nausea  Pt appears more alert and energetic after 1L NSS complete        Miko Pereira, RN  08/30/18 [id number]

## 2018-08-30 NOTE — ED ATTENDING ATTESTATION
I, Emmanuel Salazar DO, saw and evaluated the patient  I have discussed the patient with the resident/non-physician practitioner and agree with the resident's/non-physician practitioner's findings, Plan of Care, and MDM as documented in the resident's/non-physician practitioner's note, except where noted  All available labs and Radiology studies were reviewed  At this point I agree with the current assessment done in the Emergency Department  I have conducted an independent evaluation of this patient a history and physical is as follows:      Critical Care Time  CritCare Time    Procedures     66 yr old male to the ED with nausea and vomiting blood since this am   Some abd pain  Hx of diversion illeostomy a couple of months ago with post surg abcess  Denies alch, drug withdrawal, prior ulcer hx, melena, NSAID use  Exm; mild left side tenderness with referred pain to th left side  BS active  Lungs with some whz wo accessory use  Pln: Abd CT and eval for UGI bleed

## 2018-08-30 NOTE — ED PROVIDER NOTES
History  Chief Complaint   Patient presents with    Vomiting Blood     pt vomiting dark brown emesis, lighthead, dizzy, nauseaed has a colostomy due to a laceration in his colon reparied by MD Stout      This is an otherwise unhealthy 53-year-old male who with a history of COPD, tobacco abuse, alcohol abuse, CAD status post stent, right hemicolectomy secondary to perforated transverse colon resulting in a diverting ileostomy in May 2018 who presents with vomiting  The patient states that he began experiencing acute onset nausea and vomiting this morning  The vomit is described as dark brown and foul-smelling  He has vomited approximately 5 times today  States that every time he tries to eat or drink something he vomits  He also started complaining of mild lightheadedness later in the evening  States that his output has been normal from his ileostomy  Still passing gas through the ileostomy  No recent alcohol use  No NSAID use  No history of UGIB  Denies fever/chills, dizziness, numbness/weakness, headache, change in vision, URI symptoms, neck pain, chest pain, palpitations, shortness of breath, cough, back pain, flank pain, abdominal pain, diarrhea, hematochezia, melena, dysuria, hematuria  Prior to Admission Medications   Prescriptions Last Dose Informant Patient Reported? Taking?    Multiple Vitamins-Minerals (CENTRUM SILVER 50+MEN PO)  Self Yes No   Sig: Centrum Silver Men   Nebulizers (AERONEB GO NEBULIZER HANDSET) MISC  Self No No   Sig: by Does not apply route 2 (two) times a day as needed (wheezing)   Tiotropium Bromide Monohydrate (SPIRIVA RESPIMAT) 2 5 MCG/ACT AERS  Self No No   Sig: Inhale 1 Act (2 5 mcg total) daily   VENTOLIN  (90 Base) MCG/ACT inhaler  Self No No   Sig: Inhale 2 puffs every 4 (four) hours as needed for wheezing or shortness of breath   albuterol (2 5 mg/3 mL) 0 083 % nebulizer solution  Self No No   Sig: Take 3 mL (2 5 mg total) by nebulization every 4 (four) hours as needed for wheezing as needed for wheezing   amiodarone 200 mg tablet   No No   Sig: Take 1 tablet (200 mg total) by mouth daily   aspirin 81 mg chewable tablet  Self No No   Sig: Chew 1 tablet (81 mg total) daily   atorvastatin (LIPITOR) 40 mg tablet  Self No No   Sig: Take 1 tablet (40 mg total) by mouth daily with dinner for 30 days   folic acid (FOLVITE) 1 mg tablet   No No   Sig: Take 1 tablet (1 mg total) by mouth daily   gabapentin (NEURONTIN) 400 mg capsule  Self No No   Sig: Take 1 capsule (400 mg total) by mouth 3 (three) times a day   lisinopril (ZESTRIL) 2 5 mg tablet  Self No No   Sig: Take 1 tablet (2 5 mg total) by mouth daily   mesalamine (PENTASA) 500 mg CR capsule   Yes No   Sig: Take 1 capsule (500 mg total) by mouth 3 (three) times a day   nystatin (MYCOSTATIN) ointment  Self No No   Sig: Apply topically 2 (two) times a day   thiamine 100 MG tablet   No No   Sig: Take 1 tablet (100 mg total) by mouth daily   traMADol (ULTRAM) 50 mg tablet  Self Yes No   Sig: Take 100 mg by mouth every 12 (twelve) hours   vitamin A 10,000 units capsule  Self No No   Sig: Take 3 capsules (30,000 Units total) by mouth daily      Facility-Administered Medications: None       Past Medical History:   Diagnosis Date    Anxiety     Asthma     Cardiac disease     Chest pain     Colitis     Colon polyps     COPD (chronic obstructive pulmonary disease) (HCC)     Coronary artery disease     Diverticulitis     Esophageal reflux     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HCC)     Hyperlipidemia     Hypertension     IBD (inflammatory bowel disease)     [facility]     Old myocardial infarction     Perforation of colon (HCC)     Type 2 diabetes, diet controlled ([facility] )     Ulcerative colitis ([facility] )        Past Surgical History:   Procedure Laterality Date    ANGIOPLASTY      COLON SURGERY      COLONOSCOPY N/A 10/24/2016    Procedure: COLONOSCOPY;  Surgeon: Nadine Norris MD;  Location: [facility]; Service:     CORONARY ANGIOPLASTY WITH STENT PLACEMENT      ESOPHAGOGASTRODUODENOSCOPY N/A 10/24/2016    Procedure: ESOPHAGOGASTRODUODENOSCOPY (EGD); Surgeon: April Do MD;  Location: [facility]; Service:     EXPLORATORY LAPAROTOMY W/ BOWEL RESECTION N/A 5/22/2018    Procedure: EXPLORATORY LAPAROTOMY, ILIOCOLECTOMY, ILIOCOLONIC ANASTAMOSIS, LOOP ILIOSTOMY, REPAIR OF SEROSAL TEAR, EXTENSIVE LYSIS OF ADHESIONS, WOUND VAC PLACEMENT;  Surgeon: April Do MD;  Location: [facility];  Service: Colorectal    HEMICOLECTOMY      OTHER SURGICAL HISTORY      stent indications acute myocardial infarction    ME COLONOSCOPY FLX DX W/COLLJ SPEC WHEN PFRMD N/A 2/6/2018    Procedure: COLONOSCOPY;  Surgeon: April Do MD;  [facility]: [facility]; Service: Colorectal    TONSILLECTOMY         Family History   Problem Relation Age of Onset    Coronary artery disease Father     Crohn's disease Father     Stroke Father     Diabetes Family      I have reviewed and agree with the history as documented  Social History   Substance Use Topics    Smoking status: Current Every Day Smoker     Packs/day: 0 00     Years: 5 00     Types: Cigars    Smokeless tobacco: Never Used      Comment: 3 cigars per day    Alcohol use No      Comment: pt "quit about 4 months ago"        Review of Systems   Constitutional: Negative for chills, fatigue and fever  HENT: Negative for rhinorrhea, sore throat and trouble swallowing  Eyes: Negative for photophobia and visual disturbance  Respiratory: Negative for cough, chest tightness and shortness of breath  Cardiovascular: Negative for chest pain, palpitations and leg swelling  Gastrointestinal: Positive for nausea and vomiting  Negative for abdominal pain, blood in stool and diarrhea  Endocrine: Negative for polyuria  Genitourinary: Negative for dysuria, flank pain and hematuria  Musculoskeletal: Negative for back pain and neck pain     Skin: Negative for color change and rash  Allergic/Immunologic: Negative for immunocompromised state  Neurological: Positive for light-headedness  Negative for dizziness, weakness, numbness and headaches  All other systems reviewed and are negative  Physical Exam  ED Triage Vitals [08/30/18 1825]   Temperature Pulse Respirations Blood Pressure SpO2   98 7 °F (37 1 °C) (!) 41 (!) 24 (!) 82/44 94 %      Temp Source Heart Rate Source Patient Position - Orthostatic VS BP Location FiO2 (%)   Tympanic Monitor Sitting Left arm --      Pain Score       6           Orthostatic Vital Signs  Vitals:    08/30/18 1845 08/30/18 1938 08/30/18 1945 08/30/18 2045   BP: (!) 83/48 (!) 77/52 (!) 77/52 (!) 84/54   Pulse: (!) 110 (!) 109 (!) 108 104   Patient Position - Orthostatic VS:  Lying         Physical Exam   Constitutional: He appears cachectic  He is cooperative  He has a sickly appearance  No distress  HENT:   Mouth/Throat: Uvula is midline and oropharynx is clear and moist    Eyes: Conjunctivae, EOM and lids are normal  Pupils are equal, round, and reactive to light  Neck: Trachea normal  No thyroid mass and no thyromegaly present  Cardiovascular: Normal rate, regular rhythm, normal heart sounds, intact distal pulses and normal pulses  No murmur heard  Pulmonary/Chest: Effort normal  He has wheezes  Diffuse wheezing noted  Abdominal: Soft  Normal appearance and bowel sounds are normal  There is tenderness in the epigastric area, left upper quadrant and left lower quadrant  There is no rebound, no guarding, no CVA tenderness and negative Amys's sign  Right-sided ileostomy site is pink and moist  Draining liquid brown stool  Neurological: He is alert  Skin: Skin is warm, dry and intact  Psychiatric: He has a normal mood and affect   His speech is normal and behavior is normal  Thought content normal        ED Medications  Medications   nicotine (NICODERM CQ) 14 mg/24hr TD 24 hr patch 14 mg (14 mg Transdermal Medication Applied 8/30/18 2124)   sodium chloride 0 9 % bolus 1,000 mL (0 mL Intravenous Stopped 8/30/18 1949)   ondansetron (ZOFRAN) injection 4 mg (4 mg Intravenous Given 8/30/18 1853)   albuterol inhalation solution 5 mg (5 mg Nebulization Given 8/30/18 1854)   ipratropium (ATROVENT) 0 02 % inhalation solution 0 5 mg (0 5 mg Nebulization Given 8/30/18 1854)   sodium chloride 0 9 % bolus 1,000 mL (0 mL Intravenous Stopped 8/30/18 2125)   pantoprazole (PROTONIX) 80 mg in sodium chloride 0 9 % 100 mL IVPB (0 mg Intravenous Stopped 8/30/18 2115)       Diagnostic Studies  Results Reviewed     Procedure Component Value Units Date/Time    Comprehensive metabolic panel [[id number]]  (Abnormal) Collected:  08/30/18 1848    Lab Status:  Final result Specimen:  Blood from Arm, Left Updated:  08/30/18 1929     Sodium 130 (L) mmol/L      Potassium 4 7 mmol/L      Chloride 95 (L) mmol/L      CO2 20 (L) mmol/L      ANION GAP 15 (H) mmol/L      BUN 51 (H) mg/dL      Creatinine 5 04 (H) mg/dL      Glucose 163 (H) mg/dL      Calcium 10 8 (H) mg/dL      AST 10 U/L      ALT 21 U/L      Alkaline Phosphatase 133 (H) U/L      Total Protein 9 7 (H) g/dL      Albumin 4 1 g/dL      Total Bilirubin 0 23 mg/dL      eGFR 12 ml/min/1 73sq m     Narrative:         National Kidney Disease Education Program recommendations are as follows:  GFR calculation is accurate only with a steady state creatinine  Chronic Kidney disease less than 60 ml/min/1 73 sq  meters  Kidney failure less than 15 ml/min/1 73 sq  meters      Lipase [[id number]]  (Normal) Collected:  08/30/18 1848    Lab Status:  Final result Specimen:  Blood from Arm, Left Updated:  08/30/18 1929     Lipase 233 u/L     CBC and differential [[id number]]  (Abnormal) Collected:  08/30/18 1848    Lab Status:  Final result Specimen:  Blood from Arm, Left Updated:  08/30/18 1900     WBC 24 08 (H) Thousand/uL      RBC 4 66 Million/uL      Hemoglobin 12 7 g/dL      Hematocrit 39 3 % MCV 84 fL      MCH 27 3 pg      MCHC 32 3 g/dL      RDW 16 3 (H) %      MPV 10 4 fL      Platelets 301 (H) Thousands/uL      nRBC 0 /100 WBCs      Neutrophils Relative 77 (H) %      Immat GRANS % 1 %      Lymphocytes Relative 15 %      Monocytes Relative 7 %      Eosinophils Relative 0 %      Basophils Relative 0 %      Neutrophils Absolute 18 61 (H) Thousands/µL      Immature Grans Absolute 0 16 Thousand/uL      Lymphocytes Absolute 3 52 Thousands/µL      Monocytes Absolute 1 68 (H) Thousand/µL      Eosinophils Absolute 0 04 Thousand/µL      Basophils Absolute 0 07 Thousands/µL                  CT abdomen pelvis wo contrast   Final Result by Marylen Glad, MD (08/30 2103)      Postsurgical changes with near complete resolution of small fluid collections as described  No bowel obstruction  No fluid collection to suggest abscess  Stable gastric distention of uncertain etiology  Stable 2 cm complex left renal cyst                            Workstation performed: [id number]               Procedures  Procedures      Phone Consults  ED Phone Contact    ED Course  ED Course as of Aug 30 2146   Thu Aug 30, 2018   1902 WBC: (!) 24 08   1902 Hemoglobin: 12 7   1902 Platelets: (!) 378   1905 Patient suffers from chronic hypotension  86/68 in last outpatient internal medicine visit  Blood Pressure: (!) 83/48   1931 Sodium: (!) 130   1931 Potassium: 4 7   1931 BUN: (!) 51   1931 Creatinine: (!) 5 04   1931 Glucose: (!) 163   1931 Lipase: 233                               MDM  Number of Diagnoses or Management Options  Diagnosis management comments: Labs, IV fluids for hypotension  CT scan abdomen and pelvis due to concern for possible obstruction or other intra-abdominal pathology  Zofran for nausea  Patient is mentating well despite being hypotensive    Heart rate is normal     CritCare Time    Disposition  Final diagnoses:   DEBBY (acute kidney injury) ([facility] [address] )   Dehydration   Nausea and vomiting     Time reflects when diagnosis was documented in both MDM as applicable and the Disposition within this note     Time User Action Codes Description Comment    8/30/2018  7:50 PM Lyngayle Bell P Add [N17 9] DEBBY (acute kidney injury) ([facility] [address] )     8/30/2018  7:50 PM Lyngayle Mons P Add [E86 0] Dehydration     8/30/2018  7:50 PM Lynett Arabella P Add [R11 2] Nausea and vomiting       ED Disposition     ED Disposition Condition Comment    Admit  Case was discussed with SOD and the patient's admission status was agreed to be Admission Status: observation status to the service of Dr Stone Lyons   Follow-up Information    None         Patient's Medications   Discharge Prescriptions    No medications on file     No discharge procedures on file  ED Provider  Attending physically available and evaluated Madelin Matta I managed the patient along with the ED Attending      Electronically Signed by         Darryle Parkins, MD  08/30/18 3768

## 2018-08-31 ENCOUNTER — APPOINTMENT (OUTPATIENT)
Dept: RADIOLOGY | Facility: HOSPITAL | Age: 58
DRG: 253 | End: 2018-08-31
Payer: COMMERCIAL

## 2018-08-31 LAB
ANION GAP SERPL CALCULATED.3IONS-SCNC: 12 MMOL/L (ref 4–13)
ATRIAL RATE: 123 BPM
ATRIAL RATE: 97 BPM
BACTERIA UR QL AUTO: ABNORMAL /HPF
BILIRUB UR QL STRIP: ABNORMAL
BUN SERPL-MCNC: 51 MG/DL (ref 5–25)
CALCIUM SERPL-MCNC: 9 MG/DL (ref 8.3–10.1)
CHLORIDE SERPL-SCNC: 106 MMOL/L (ref 100–108)
CLARITY UR: ABNORMAL
CO2 SERPL-SCNC: 16 MMOL/L (ref 21–32)
COLOR UR: ABNORMAL
CREAT SERPL-MCNC: 5.24 MG/DL (ref 0.6–1.3)
CREAT UR-MCNC: 280 MG/DL
ERYTHROCYTE [DISTWIDTH] IN BLOOD BY AUTOMATED COUNT: 16.2 % (ref 11.6–15.1)
GFR SERPL CREATININE-BSD FRML MDRD: 11 ML/MIN/1.73SQ M
GLUCOSE SERPL-MCNC: 91 MG/DL (ref 65–140)
GLUCOSE UR STRIP-MCNC: NEGATIVE MG/DL
HCT VFR BLD AUTO: 31.5 % (ref 36.5–49.3)
HGB BLD-MCNC: 9.8 G/DL (ref 12–17)
HGB UR QL STRIP.AUTO: NEGATIVE
HYALINE CASTS #/AREA URNS LPF: ABNORMAL /LPF
KETONES UR STRIP-MCNC: ABNORMAL MG/DL
LEUKOCYTE ESTERASE UR QL STRIP: ABNORMAL
MAGNESIUM SERPL-MCNC: 1.1 MG/DL (ref 1.6–2.6)
MCH RBC QN AUTO: 27.3 PG (ref 26.8–34.3)
MCHC RBC AUTO-ENTMCNC: 31.1 G/DL (ref 31.4–37.4)
MCV RBC AUTO: 88 FL (ref 82–98)
NITRITE UR QL STRIP: NEGATIVE
NON-SQ EPI CELLS URNS QL MICRO: ABNORMAL /HPF
P AXIS: 71 DEGREES
P AXIS: 89 DEGREES
PH UR STRIP.AUTO: 5 [PH] (ref 4.5–8)
PHOSPHATE SERPL-MCNC: 4.6 MG/DL (ref 2.7–4.5)
PLATELET # BLD AUTO: 319 THOUSANDS/UL (ref 149–390)
PMV BLD AUTO: 10.7 FL (ref 8.9–12.7)
POTASSIUM SERPL-SCNC: 4.5 MMOL/L (ref 3.5–5.3)
PR INTERVAL: 122 MS
PR INTERVAL: 134 MS
PROT UR STRIP-MCNC: ABNORMAL MG/DL
QRS AXIS: 234 DEGREES
QRS AXIS: 269 DEGREES
QRSD INTERVAL: 104 MS
QRSD INTERVAL: 104 MS
QT INTERVAL: 316 MS
QT INTERVAL: 356 MS
QTC INTERVAL: 452 MS
QTC INTERVAL: 452 MS
RBC # BLD AUTO: 3.59 MILLION/UL (ref 3.88–5.62)
RBC #/AREA URNS AUTO: ABNORMAL /HPF
SODIUM 24H UR-SCNC: 12 MOL/L
SODIUM SERPL-SCNC: 134 MMOL/L (ref 136–145)
SP GR UR STRIP.AUTO: 1.02 (ref 1–1.03)
T WAVE AXIS: 76 DEGREES
T WAVE AXIS: 77 DEGREES
UROBILINOGEN UR QL STRIP.AUTO: 0.2 E.U./DL
VENTRICULAR RATE: 123 BPM
VENTRICULAR RATE: 97 BPM
WBC # BLD AUTO: 15.94 THOUSAND/UL (ref 4.31–10.16)
WBC #/AREA URNS AUTO: ABNORMAL /HPF

## 2018-08-31 PROCEDURE — 85027 COMPLETE CBC AUTOMATED: CPT | Performed by: INTERNAL MEDICINE

## 2018-08-31 PROCEDURE — 84100 ASSAY OF PHOSPHORUS: CPT | Performed by: INTERNAL MEDICINE

## 2018-08-31 PROCEDURE — 80048 BASIC METABOLIC PNL TOTAL CA: CPT | Performed by: INTERNAL MEDICINE

## 2018-08-31 PROCEDURE — 99285 EMERGENCY DEPT VISIT HI MDM: CPT

## 2018-08-31 PROCEDURE — 81001 URINALYSIS AUTO W/SCOPE: CPT | Performed by: INTERNAL MEDICINE

## 2018-08-31 PROCEDURE — 99254 IP/OBS CNSLTJ NEW/EST MOD 60: CPT | Performed by: INTERNAL MEDICINE

## 2018-08-31 PROCEDURE — 76770 US EXAM ABDO BACK WALL COMP: CPT

## 2018-08-31 PROCEDURE — 84300 ASSAY OF URINE SODIUM: CPT | Performed by: INTERNAL MEDICINE

## 2018-08-31 PROCEDURE — 82570 ASSAY OF URINE CREATININE: CPT | Performed by: INTERNAL MEDICINE

## 2018-08-31 PROCEDURE — 83735 ASSAY OF MAGNESIUM: CPT | Performed by: INTERNAL MEDICINE

## 2018-08-31 PROCEDURE — C9113 INJ PANTOPRAZOLE SODIUM, VIA: HCPCS | Performed by: INTERNAL MEDICINE

## 2018-08-31 PROCEDURE — 94760 N-INVAS EAR/PLS OXIMETRY 1: CPT

## 2018-08-31 PROCEDURE — 93010 ELECTROCARDIOGRAM REPORT: CPT | Performed by: INTERNAL MEDICINE

## 2018-08-31 PROCEDURE — 94640 AIRWAY INHALATION TREATMENT: CPT

## 2018-08-31 RX ORDER — LEVALBUTEROL 1.25 MG/.5ML
SOLUTION, CONCENTRATE RESPIRATORY (INHALATION)
Status: DISPENSED
Start: 2018-08-31 | End: 2018-08-31

## 2018-08-31 RX ORDER — ASPIRIN 81 MG/1
81 TABLET, CHEWABLE ORAL DAILY
Status: DISCONTINUED | OUTPATIENT
Start: 2018-09-01 | End: 2018-09-03 | Stop reason: HOSPADM

## 2018-08-31 RX ORDER — GABAPENTIN 100 MG/1
100 CAPSULE ORAL 3 TIMES DAILY
Status: DISCONTINUED | OUTPATIENT
Start: 2018-08-31 | End: 2018-09-03 | Stop reason: HOSPADM

## 2018-08-31 RX ORDER — LEVALBUTEROL 1.25 MG/.5ML
1.25 SOLUTION, CONCENTRATE RESPIRATORY (INHALATION) 2 TIMES DAILY
Status: DISCONTINUED | OUTPATIENT
Start: 2018-08-31 | End: 2018-08-31

## 2018-08-31 RX ORDER — SODIUM CHLORIDE FOR INHALATION 0.9 %
VIAL, NEBULIZER (ML) INHALATION
Status: COMPLETED
Start: 2018-08-31 | End: 2018-08-31

## 2018-08-31 RX ORDER — LEVALBUTEROL 1.25 MG/.5ML
1.25 SOLUTION, CONCENTRATE RESPIRATORY (INHALATION)
Status: DISCONTINUED | OUTPATIENT
Start: 2018-08-31 | End: 2018-09-03 | Stop reason: HOSPADM

## 2018-08-31 RX ORDER — GABAPENTIN 400 MG/1
400 CAPSULE ORAL 3 TIMES DAILY
Status: DISCONTINUED | OUTPATIENT
Start: 2018-08-31 | End: 2018-08-31

## 2018-08-31 RX ORDER — SODIUM CHLORIDE FOR INHALATION 0.9 %
3 VIAL, NEBULIZER (ML) INHALATION 2 TIMES DAILY
Status: DISCONTINUED | OUTPATIENT
Start: 2018-08-31 | End: 2018-08-31

## 2018-08-31 RX ORDER — SODIUM CHLORIDE FOR INHALATION 0.9 %
3 VIAL, NEBULIZER (ML) INHALATION
Status: DISCONTINUED | OUTPATIENT
Start: 2018-08-31 | End: 2018-09-03 | Stop reason: HOSPADM

## 2018-08-31 RX ADMIN — SODIUM BICARBONATE 150 ML/HR: 84 INJECTION, SOLUTION INTRAVENOUS at 12:19

## 2018-08-31 RX ADMIN — LEVALBUTEROL HYDROCHLORIDE 1.25 MG: 1.25 SOLUTION, CONCENTRATE RESPIRATORY (INHALATION) at 19:11

## 2018-08-31 RX ADMIN — GABAPENTIN 100 MG: 100 CAPSULE ORAL at 17:23

## 2018-08-31 RX ADMIN — OXYCODONE HYDROCHLORIDE 10 MG: 10 TABLET ORAL at 17:24

## 2018-08-31 RX ADMIN — ONDANSETRON 4 MG: 2 INJECTION INTRAMUSCULAR; INTRAVENOUS at 02:19

## 2018-08-31 RX ADMIN — LEVALBUTEROL HYDROCHLORIDE 1.25 MG: 1.25 SOLUTION, CONCENTRATE RESPIRATORY (INHALATION) at 07:52

## 2018-08-31 RX ADMIN — SODIUM BICARBONATE 150 ML/HR: 84 INJECTION, SOLUTION INTRAVENOUS at 21:29

## 2018-08-31 RX ADMIN — ISODIUM CHLORIDE 3 ML: 0.03 SOLUTION RESPIRATORY (INHALATION) at 07:52

## 2018-08-31 RX ADMIN — ISODIUM CHLORIDE 3 ML: 0.03 SOLUTION RESPIRATORY (INHALATION) at 08:01

## 2018-08-31 RX ADMIN — ATORVASTATIN CALCIUM 40 MG: 40 TABLET, FILM COATED ORAL at 17:23

## 2018-08-31 RX ADMIN — OXYCODONE HYDROCHLORIDE 5 MG: 5 TABLET ORAL at 12:25

## 2018-08-31 RX ADMIN — NICOTINE 1 PATCH: 14 PATCH, EXTENDED RELEASE TRANSDERMAL at 08:31

## 2018-08-31 RX ADMIN — TIOTROPIUM BROMIDE 18 MCG: 18 CAPSULE ORAL; RESPIRATORY (INHALATION) at 08:32

## 2018-08-31 RX ADMIN — MESALAMINE 500 MG: 250 CAPSULE ORAL at 08:31

## 2018-08-31 RX ADMIN — MESALAMINE 500 MG: 250 CAPSULE ORAL at 17:24

## 2018-08-31 RX ADMIN — SODIUM CHLORIDE 125 ML/HR: 0.9 INJECTION, SOLUTION INTRAVENOUS at 08:39

## 2018-08-31 RX ADMIN — OXYCODONE HYDROCHLORIDE 10 MG: 10 TABLET ORAL at 21:50

## 2018-08-31 RX ADMIN — OXYCODONE HYDROCHLORIDE 5 MG: 5 TABLET ORAL at 00:02

## 2018-08-31 RX ADMIN — AMIODARONE HYDROCHLORIDE 200 MG: 200 TABLET ORAL at 08:31

## 2018-08-31 RX ADMIN — ISODIUM CHLORIDE 3 ML: 0.03 SOLUTION RESPIRATORY (INHALATION) at 19:11

## 2018-08-31 RX ADMIN — OXYCODONE HYDROCHLORIDE 10 MG: 10 TABLET ORAL at 10:40

## 2018-08-31 RX ADMIN — GABAPENTIN 100 MG: 100 CAPSULE ORAL at 21:50

## 2018-08-31 RX ADMIN — MESALAMINE 500 MG: 250 CAPSULE ORAL at 21:49

## 2018-08-31 RX ADMIN — PANTOPRAZOLE SODIUM 40 MG: 40 INJECTION, POWDER, FOR SOLUTION INTRAVENOUS at 08:32

## 2018-08-31 RX ADMIN — OXYCODONE HYDROCHLORIDE 10 MG: 10 TABLET ORAL at 05:25

## 2018-08-31 RX ADMIN — NYSTATIN 1 APPLICATION: 100000 OINTMENT TOPICAL at 17:24

## 2018-08-31 RX ADMIN — PANTOPRAZOLE SODIUM 40 MG: 40 INJECTION, POWDER, FOR SOLUTION INTRAVENOUS at 21:50

## 2018-08-31 NOTE — PROGRESS NOTES
IM Residency Progress Note   Unit/Bed#: CW2 217-03 Encounter: [id number]  SOD Team B       Kevin Gonzalez 66 y o  male [id number]    Hospital Stay Days: 0      Assessment/Plan:    Principal Problem:    Hematemesis with nausea  Active Problems:    Crohn disease ([place] [address] )    Hypertension    Coronary artery disease involving native coronary artery of native heart without angina pectoris    Leukocytosis    COPD (chronic obstructive pulmonary disease) ([facility])    Cigarette nicotine dependence without complication    Heart failure, systolic, due to idiopathic cardiomyopathy ([place] [address] )    Ileostomy in place ([place] [address] )    Hypotension    DEBBY (acute kidney injury) ([place] [address] )    Abdominal pain    Ambulatory dysfunction    Presence of drug-eluting stent in right coronary artery    Paroxysmal atrial fibrillation (HCC)    Hypercalcemia    DEBBY  -creatinine greater than 5  -not resolving with IV fluids  -nephrology on board  -UA, renal ultrasound ordered  -patient is currently anuric    Hematemesis with nausea  -patient no longer experiencing any vomiting, hemoglobin currently stable  -reports mild nausea  -given stable clinical appearance GI consult was canceled  -continue to monitor  -PPI on board  -Zofran p r n  Crohn's disease/ileostomy  -mesalamine 500 mg 3 times a day  -nystatin cream    Hypertension  -currently holding home BP meds the same and hypertension    Coronary artery disease  -continue statin,    Heart failure systolic, due to idiopathic cardiomyopathy  -not currently in acute exacerbation  -continue to monitor    Atrial fibrillation  -amiodarone 200 mg daily    Hypercalcemia  -likely contraction, continue to monitor    COPD  -albuterol p r n   -levalbuterol inhalation solution  -tiotropium     Nicotine dependence  -nicotine patch    Hypotension  -resolved with IV fluids  -currently normotensive    Disposition: Discharge pending determination of DEBBY      Subjective:   No acute events overnight    Patient reports no vomiting this morning, reports mild nausea  Patient would like to have food and just ordered breakfast   No fevers, chills  Patient reports he has not urinated since yesterday  Reports mild abdominal tenderness to palpation  Otherwise patient is well-appearing nontoxic and has no other complaints  Vitals: Temp (24hrs), Av 5 °F (36 9 °C), Min:98 3 °F (36 8 °C), Max:98 7 °F (37 1 °C)  Current: Temperature: 98 3 °F (36 8 °C)  Vitals:    18 0020 18 0047 18 0100 18 0755   BP: (!) 78/48  124/82    BP Location: Right arm  Left arm    Pulse: 96  82    Resp: 18  18    Temp:   98 3 °F (36 8 °C)    TempSrc:   Oral    SpO2: 93%  93% 94%   Weight:  63 5 kg (140 lb) 57 1 kg (125 lb 12 8 oz)    Height:   5' 6" (1 676 m)     Body mass index is 20 3 kg/m²  I/O last 24 hours: In: 3000 [IV Piggyback:3000]  Out: 200 [Stool:200]      Physical Exam:  Physical Exam   Constitutional: He is oriented to person, place, and time  He appears well-developed and well-nourished  No distress  HENT:   Head: Normocephalic and atraumatic  Eyes: Conjunctivae are normal  No scleral icterus  Cardiovascular: Normal rate, regular rhythm and normal heart sounds  Exam reveals no gallop and no friction rub  No murmur heard  Distant heart sounds   Pulmonary/Chest: Effort normal  No respiratory distress  He has wheezes  He has no rales  Abdominal: Soft  He exhibits no distension and no mass  There is tenderness (Mild umblicial and RLQ tenderness)  There is no rebound and no guarding  Colostomy bag   Musculoskeletal: He exhibits no edema, tenderness or deformity  Neurological: He is alert and oriented to person, place, and time  Skin: Skin is warm and dry  No rash noted  He is not diaphoretic  No erythema  Psychiatric: He has a normal mood and affect   His behavior is normal  Thought content normal          Invasive Devices     Peripheral Intravenous Line            Peripheral IV 18 Left Antecubital less than 1 day          Drain            Colostomy   days                          Labs:   Recent Results (from the past 24 hour(s))   Comprehensive metabolic panel    Collection Time: 08/30/18  6:48 PM   Result Value Ref Range    Sodium 130 (L) 136 - 145 mmol/L    Potassium 4 7 3 5 - 5 3 mmol/L    Chloride 95 (L) 100 - 108 mmol/L    CO2 20 (L) 21 - 32 mmol/L    ANION GAP 15 (H) 4 - 13 mmol/L    BUN 51 (H) 5 - 25 mg/dL    Creatinine 5 04 (H) 0 60 - 1 30 mg/dL    Glucose 163 (H) 65 - 140 mg/dL    Calcium 10 8 (H) 8 3 - 10 1 mg/dL    AST 10 5 - 45 U/L    ALT 21 12 - 78 U/L    Alkaline Phosphatase 133 (H) 46 - 116 U/L    Total Protein 9 7 (H) 6 4 - 8 2 g/dL    Albumin 4 1 3 5 - 5 0 g/dL    Total Bilirubin 0 23 0 20 - 1 00 mg/dL    eGFR 12 ml/min/1 73sq m   Lipase    Collection Time: 08/30/18  6:48 PM   Result Value Ref Range    Lipase 233 73 - 393 u/L   CBC and differential    Collection Time: 08/30/18  6:48 PM   Result Value Ref Range    WBC 24 08 (H) 4 31 - 10 16 Thousand/uL    RBC 4 66 3 88 - 5 62 Million/uL    Hemoglobin 12 7 12 0 - 17 0 g/dL    Hematocrit 39 3 36 5 - 49 3 %    MCV 84 82 - 98 fL    MCH 27 3 26 8 - 34 3 pg    MCHC 32 3 31 4 - 37 4 g/dL    RDW 16 3 (H) 11 6 - 15 1 %    MPV 10 4 8 9 - 12 7 fL    Platelets 221 (H) 983 - 390 Thousands/uL    nRBC 0 /100 WBCs    Neutrophils Relative 77 (H) 43 - 75 %    Immat GRANS % 1 0 - 2 %    Lymphocytes Relative 15 14 - 44 %    Monocytes Relative 7 4 - 12 %    Eosinophils Relative 0 0 - 6 %    Basophils Relative 0 0 - 1 %    Neutrophils Absolute 18 61 (H) 1 85 - 7 62 Thousands/µL    Immature Grans Absolute 0 16 0 00 - 0 20 Thousand/uL    Lymphocytes Absolute 3 52 0 60 - 4 47 Thousands/µL    Monocytes Absolute 1 68 (H) 0 17 - 1 22 Thousand/µL    Eosinophils Absolute 0 04 0 00 - 0 61 Thousand/µL    Basophils Absolute 0 07 0 00 - 0 10 Thousands/µL   Basic metabolic panel    Collection Time: 08/31/18  5:16 AM   Result Value Ref Range    Sodium 134 (L) 136 - 145 mmol/L Potassium 4 5 3 5 - 5 3 mmol/L    Chloride 106 100 - 108 mmol/L    CO2 16 (L) 21 - 32 mmol/L    ANION GAP 12 4 - 13 mmol/L    BUN 51 (H) 5 - 25 mg/dL    Creatinine 5 24 (H) 0 60 - 1 30 mg/dL    Glucose 91 65 - 140 mg/dL    Calcium 9 0 8 3 - 10 1 mg/dL    eGFR 11 ml/min/1 73sq m   Magnesium    Collection Time: 08/31/18  5:16 AM   Result Value Ref Range    Magnesium 1 1 (L) 1 6 - 2 6 mg/dL   Phosphorus    Collection Time: 08/31/18  5:16 AM   Result Value Ref Range    Phosphorus 4 6 (H) 2 7 - 4 5 mg/dL       Radiology Results: I have personally reviewed pertinent reports  Other Diagnostic Testing:   I have personally reviewed pertinent reports          Active Meds:   Current Facility-Administered Medications   Medication Dose Route Frequency    albuterol (PROVENTIL HFA,VENTOLIN HFA) inhaler 2 puff  2 puff Inhalation Q4H PRN    albuterol inhalation solution 2 5 mg  2 5 mg Nebulization Q4H PRN    amiodarone tablet 200 mg  200 mg Oral Daily    atorvastatin (LIPITOR) tablet 40 mg  40 mg Oral Daily With Dinner    levalbuterol (XOPENEX) 1 25 mg/0 5 mL inhalation solution **AcuDose Override Pull**        levalbuterol (XOPENEX) inhalation solution 1 25 mg  1 25 mg Nebulization BID    mesalamine (PENTASA) ER capsule 500 mg  500 mg Oral TID    nicotine (NICODERM CQ) 14 mg/24hr TD 24 hr patch 1 patch  1 patch Transdermal Daily    nicotine (NICODERM CQ) 14 mg/24hr TD 24 hr patch 14 mg  14 mg Transdermal Once    nystatin (MYCOSTATIN) ointment   Topical BID    ondansetron (ZOFRAN) injection 4 mg  4 mg Intravenous Q4H PRN    oxyCODONE (ROXICODONE) immediate release tablet 10 mg  10 mg Oral Q4H PRN    oxyCODONE (ROXICODONE) IR tablet 5 mg  5 mg Oral Q4H PRN    pantoprazole (PROTONIX) injection 40 mg  40 mg Intravenous Q12H [facility]    pneumococcal 23-valent polysaccharide vaccine (PNEUMOVAX-23) injection 0 5 mL  0 5 mL Subcutaneous Prior to discharge    sodium chloride 0 9 % infusion  125 mL/hr Intravenous Continuous    sodium chloride 0 9 % inhalation solution 3 mL  3 mL Nebulization BID    tiotropium (SPIRIVA) capsule for inhaler 18 mcg  18 mcg Inhalation Daily         VTE Pharmacologic Prophylaxis: RX contraindicated due to: Possible GI bleeding  VTE Mechanical Prophylaxis: sequential compression device    Opal Hurley MD

## 2018-08-31 NOTE — CONSULTS
2           Consultation - Nephrology   Kadeem Pelayo 66 y o  male MRN: [id number]  Unit/Bed#: CW2 217-03 Encounter: [id number]      Assessment/Plan     Assessment / Plan:  1  Renal    The patient presents with acute renal failure and he states he was really only vomiting significantly yesterday  His creatinine earlier this month on discharge from the hospital was 1 so this is definitely an acute renal failure  He states he really has not been on new medications  He had a CT scan to did not show hydronephrosis  The question is whether not the patient has severe pre renal azotemia  He also has developed metabolic acidosis  Monitor on IV fluids but change to fluids with bicarbonate given non gap acidosis  For renal ultrasound  Check urine sodium to get an idea about effective volume depletion and if he remains oliguric will do urine creatinine to do fractional excretion  Monitor labs  Off ACE-inhibitor    2  Mild hypercalcemia  May be due to volume depletion follow with IV fluids  3   Possible GI bleed  Patient is stable clinically  Hemoglobin was also much improved since he left the hospital in early August     I reviewed the urine sodium and creatinine in the fractional excretion is less than 1% which suggests hypovolemia  Continue with aggressive volume repletion and monitor  History of Present Illness   Physician Requesting Consult: Shasta Vicente MD  Reason for Consult / Principal Problem:  Acute renal failure  Hx and PE limited by:   HPI: Kadeem Pelayo is a 66y o  year old male who presents with significant vomiting and nausea that started yesterday and really was only there 1 day  He was feeling lightheaded could not keep any food down was not urinating so he came into the hospital was found to have significant renal failure  We were asked to see him regarding this  History obtained from chart review and the patient      Inpatient consult to Nephrology  Consult performed by: [id number] [facility], R Josue Rodriguez 1  Consult ordered by: Suzie Wilson          Review of Systems   Constitutional: Negative for chills and fever  HENT: Negative  Eyes: Negative  Respiratory: Negative  Negative for cough, choking and chest tightness  Cardiovascular: Negative for chest pain and leg swelling  Gastrointestinal: Positive for nausea and vomiting  Negative for abdominal distention and abdominal pain  States ostomy is putting out stool   Genitourinary: Negative for dysuria and hematuria  Patient states has not urinated   Neurological: Negative  Psychiatric/Behavioral: Negative          Historical Information   Patient Active Problem List   Diagnosis    [facility]    Spinal stenosis of cervical region    Hypertension    Pericarditis    Coronary artery disease involving native coronary artery of native heart without angina pectoris    Leukocytosis    Asthma    Atherosclerosis of coronary artery    [facility] obstructive [facility] disease) [facility])    Dyslipidemia    Alcohol abuse    Cigarette nicotine dependence without complication    Alcohol dependence with withdrawal ([facility]    Acute respiratory insufficiency    Agitation    Heart failure, systolic, due to idiopathic cardiomyopathy ([facility] )    Ileostomy in place ([facility]    Moderate protein-calorie malnutrition ([facility]    Anemia    Hypocalcemia    Perforation of colon ([facility])    Hypotension    Intra-abdominal abscess ([facility]    DEBBY (acute kidney injury) ([facility]    Abdominal pain    Ambulatory dysfunction    Presence of drug-eluting stent in right coronary artery    Paroxysmal atrial fibrillation ([facility])    Hypercalcemia    Hematemesis with nausea     Past Medical History:   Diagnosis Date    Anxiety     Asthma     Cardiac disease     Chest pain     Colitis     Colon polyps     COPD (chronic obstructive pulmonary disease) ([facility])     Coronary artery disease     Diverticulitis     Esophageal reflux     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[facility] )     Hyperlipidemia     Hypertension     IBD (inflammatory bowel disease)     Myocardial infarction ([facility] )     Old myocardial infarction     Perforation of colon (HCC)     Type 2 diabetes, diet controlled ([place] [address] )     Ulcerative colitis ([place] [address] )      Past Surgical History:   Procedure Laterality Date    ANGIOPLASTY      COLON SURGERY      COLONOSCOPY N/A 10/24/2016    Procedure: COLONOSCOPY;  Surgeon: Edel Sebastian MD;  [facility]; Service:     CORONARY ANGIOPLASTY WITH STENT PLACEMENT      ESOPHAGOGASTRODUODENOSCOPY N/A 10/24/2016    Procedure: ESOPHAGOGASTRODUODENOSCOPY (EGD); Surgeon: Edel Sebastian MD;  [facility]: [facility]; Service:     EXPLORATORY LAPAROTOMY W/ BOWEL RESECTION N/A 5/22/2018    Procedure: EXPLORATORY LAPAROTOMY, ILIOCOLECTOMY, ILIOCOLONIC ANASTAMOSIS, LOOP ILIOSTOMY, REPAIR OF SEROSAL TEAR, EXTENSIVE LYSIS OF ADHESIONS, WOUND VAC PLACEMENT;  Surgeon: Edel Sebastian MD;  [facility]: [facility];  Service: Colorectal    HEMICOLECTOMY      OTHER SURGICAL HISTORY      stent indications acute myocardial infarction    CT COLONOSCOPY FLX DX W/COLLJ SPEC WHEN PFRMD N/A 2/6/2018    Procedure: COLONOSCOPY;  Surgeon: Edel Sebastian MD;  [facility];   Service: Colorectal    TONSILLECTOMY       Social History   History   Alcohol Use No     Comment: pt "quit about 4 months ago"     History   Drug Use No     History   Smoking Status    Current Every Day Smoker    Packs/day: 0 00    Years: 5 00    Types: Cigars   Smokeless Tobacco    Never Used     Comment: 3 cigars per day     Family History   Problem Relation Age of Onset    Coronary artery disease Father     Crohn's disease Father     Stroke Father     Diabetes Family        Meds/Allergies   current meds:   Current Facility-Administered Medications   Medication Dose Route Frequency    albuterol (PROVENTIL HFA,VENTOLIN HFA) inhaler 2 puff  2 puff Inhalation Q4H PRN    albuterol inhalation solution 2 5 mg  2 5 mg Nebulization Q4H PRN    amiodarone tablet 200 mg  200 mg Oral Daily    atorvastatin (LIPITOR) tablet 40 mg  40 mg Oral Daily With Dinner    gabapentin (NEURONTIN) capsule 100 mg  100 mg Oral TID    levalbuterol (XOPENEX) 1 25 mg/0 5 mL inhalation solution **AcuDose Override Pull**        levalbuterol (XOPENEX) inhalation solution 1 25 mg  1 25 mg Nebulization BID    mesalamine (PENTASA) ER capsule 500 mg  500 mg Oral TID    nicotine (NICODERM CQ) 14 mg/24hr TD 24 hr patch 1 patch  1 patch Transdermal Daily    nicotine (NICODERM CQ) 14 mg/24hr TD 24 hr patch 14 mg  14 mg Transdermal Once    nystatin (MYCOSTATIN) ointment   Topical BID    ondansetron (ZOFRAN) injection 4 mg  4 mg Intravenous Q4H PRN    oxyCODONE (ROXICODONE) immediate release tablet 10 mg  10 mg Oral Q4H PRN    oxyCODONE (ROXICODONE) IR tablet 5 mg  5 mg Oral Q4H PRN    pantoprazole (PROTONIX) injection 40 mg  40 mg Intravenous Q12H [address]    pneumococcal 23-valent polysaccharide vaccine (PNEUMOVAX-23) injection 0 5 mL  0 5 mL Subcutaneous Prior to discharge    sodium bicarbonate 75 mEq in sodium chloride 0 45 % 1,000 mL infusion  125 mL/hr Intravenous Continuous    sodium chloride 0 9 % inhalation solution 3 mL  3 mL Nebulization BID    tiotropium (SPIRIVA) capsule for inhaler 18 mcg  18 mcg Inhalation Daily     Allergies   Allergen Reactions    Medical Tape     Other      Brown cloth band aids       Latex Rash       Objective     Intake/Output Summary (Last 24 hours) at 08/31/18 0951  Last data filed at 08/31/18 0201   Gross per 24 hour   Intake             3000 ml   Output              200 ml   Net             2800 ml     Body mass index is 20 3 kg/m²      Invasive Devices:        PHYSICAL EXAM:  /82 (BP Location: Left arm)   Pulse 82   Temp 98 3 °F (36 8 °C) (Oral)   Resp 18   Ht 5' 6" (1 676 m)   Wt 57 1 kg (125 lb 12 8 oz)   SpO2 94%   BMI 20 30 kg/m²     Physical Exam   Constitutional: He is oriented to person, place, and time  No distress  HENT:   Mouth/Throat: No oropharyngeal exudate  Eyes: No scleral icterus  Neck: Neck supple  No JVD present  Cardiovascular: Normal rate  Exam reveals no friction rub  Pulmonary/Chest: Effort normal and breath sounds normal  No respiratory distress  He has no wheezes  He has no rales  Abdominal: Soft  Bowel sounds are normal  He exhibits no distension  There is no tenderness  There is no rebound  Ostomy present anterior abdomen   Neurological: He is alert and oriented to person, place, and time           Current Weight: Weight - Scale: 57 1 kg (125 lb 12 8 oz)  First Weight: Weight - Scale: 63 5 kg (140 lb)    Lab Results:      Results from last 7 days  Lab Units 08/31/18  0516   WBC Thousand/uL 15 94*   HEMOGLOBIN g/dL 9 8*   HEMATOCRIT % 31 5*   PLATELETS Thousands/uL 319       Results from last 7 days  Lab Units 08/31/18  0516   SODIUM mmol/L 134*   POTASSIUM mmol/L 4 5   CHLORIDE mmol/L 106   CO2 mmol/L 16*   BUN mg/dL 51*   CREATININE mg/dL 5 24*   CALCIUM mg/dL 9 0       Results from last 7 days  Lab Units 08/31/18  0516 08/30/18  1848   SODIUM mmol/L 134* 130*   POTASSIUM mmol/L 4 5 4 7   CHLORIDE mmol/L 106 95*   CO2 mmol/L 16* 20*   BUN mg/dL 51* 51*   CREATININE mg/dL 5 24* 5 04*   CALCIUM mg/dL 9 0 10 8*   ALK PHOS U/L  --  133*   ALT U/L  --  21   AST U/L  --  10

## 2018-08-31 NOTE — RESPIRATORY THERAPY NOTE
RT Protocol Note  Bobbi Nguyen 66 y o  male MRN: [id number]  Unit/Bed#: CW2 217-03 Encounter: [id number]    Assessment    Principal Problem:    Hematemesis with nausea  Active Problems:    Crohn disease ([place] [address] )    Hypertension    Coronary artery disease involving native coronary artery of native heart without angina pectoris    Leukocytosis    COPD (chronic obstructive pulmonary disease) ([facility])    Cigarette nicotine dependence without complication    Heart failure, systolic, due to idiopathic cardiomyopathy ([place] [address] )    Ileostomy in place ([place] [address] )    Hypotension    DEBBY (acute kidney injury) ([place] [address] )    Abdominal pain    Ambulatory dysfunction    Presence of drug-eluting stent in right coronary artery    Paroxysmal atrial fibrillation ([facility])    Hypercalcemia      Home Pulmonary Medications:   Spiriva daily, MDI albuterol 2puffs Prn, UDN's BID  Past Medical History:   Diagnosis Date    Anxiety     Asthma     Cardiac disease     Chest pain     Colitis     Colon polyps     COPD (chronic obstructive pulmonary disease) ([facility])     Coronary artery disease     Diverticulitis     Esophageal reflux     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[facility])     Hyperlipidemia     Hypertension     IBD (inflammatory bowel disease)     Myocardial infarction ([place] [address] )     Old myocardial infarction     Perforation of colon ([facility])     Type 2 diabetes, diet controlled ([place] [address] )     Ulcerative colitis ([place] [address] )      Social History     Social History    Marital status:       Spouse name: N/A    Number of children: N/A    Years of education: N/A     Social History Main Topics    Smoking status: Current Every Day Smoker     Packs/day: 0 00     Years: 5 00     Types: Cigars    Smokeless tobacco: Never Used      Comment: 3 cigars per day    Alcohol use No      Comment: pt "quit about 4 months ago"    Drug use: No    Sexual activity: Yes     Partners: Female     Birth control/ protection: None     Other Topics Concern    None     Social History Narrative    None       Subjective         Objective    Physical Exam:   Assessment Type: (P) Assess only  General Appearance: (P) Awake, Alert  Respiratory Pattern: (P) Normal  Chest Assessment: (P) Chest expansion symmetrical  Bilateral Breath Sounds: (P) Diminished    Vitals:  Blood pressure 124/82, pulse 82, temperature 98 3 °F (36 8 °C), temperature source Oral, resp  rate 18, height 5' 6" (1 676 m), weight 57 1 kg (125 lb 12 8 oz), SpO2 93 %  Imaging and other studies: I have personally reviewed pertinent reports  Plan    Respiratory Plan: Home Bronchodilator Patient pathway        Resp Comments: [de-identified]) 66 yr old male pt admitted with Hematemesis found resting comfortably in no distress, zuleika bs diminshed, npc, x-ray na, pt has hx of copd and uses udns bid, sprivia daily and prn mdi albuterol 2puffs as needed, will follow pts home therapy, ordered udns bid and prn as well as mdi albuterol 2puffs Q4prn

## 2018-08-31 NOTE — CASE MANAGEMENT
Initial Clinical Review    Admission: Date/Time/Statement: Observation 8/30 and changed to Inpatient on 8/31 @ 1327  Pt requiring continued stay for Acute Kidney Injury/ Abnormal Labs/ IVF      ED: Date/Time/Mode of Arrival:   ED Arrival Information     Expected Arrival Acuity Means of Arrival Escorted By Service Admission Type    - 8/30/2018 18:20 Emergent Walk-In Family Member General Medicine Emergency    Arrival Complaint    Dizziness          Chief Complaint:   Chief Complaint   Patient presents with    Vomiting Blood     pt vomiting dark brown emesis, lighthead, dizzy, nauseaed has a colostomy due to a laceration in his colon reparied by MD Stout        History of Illness: 66 y o  male with a history of tobacco abuse coronary artery disease status post drug-eluting stent, inflammatory bowel disease with ileostomy placed May 2018, COPD with supplemental oxygen at night who presents with 1 day of nausea and vomiting brown colored vomit  He says that the vomit is not black and does not have a coffee-ground appearance, but that it comes up dark brown even when he has only had water  He states that it started this morning  This has been associated with dizziness and lightheadedness  He had been feeling well prior to this  No recent travel  No sick contacts  He does note that he had some slightly increased ostomy output before the vomiting started but since then it has decreased  However he is still having output  He notes that he has not urinated since yesterday morning  He says that he has a feeling of heartburn or reflux but that he does not have a history of this      In the ED, he received 2 fluid boluses  He has continued to feel nausea and some reflux but has not vomited  He has a leukocytosis to 24 and thrombocytosis to 512  Of note, his hemoglobin is normal at 0 7  He also has a significant elevation in creatinine to 5 04  His lipase was negative at 233   He had a CT scan that was notable for postsurgical changes with near complete resolution of small fluid collections  He has gastric distention which is stable from prior CT  ED Vital Signs:   ED Triage Vitals [08/30/18 1825]   Temperature Pulse Respirations Blood Pressure SpO2   98 7 °F (37 1 °C) (!) 41 (!) 24 (!) 82/44 94 %      Temp Source Heart Rate Source Patient Position - Orthostatic VS BP Location FiO2 (%)   Tympanic Monitor Sitting Left arm --      Pain Score       6        Wt Readings from Last 1 Encounters:   08/31/18 57 1 kg (125 lb 12 8 oz)       Vital Signs (abnormal):   08/30/18 2045  --  104  20   84/54  95 %  --  --   08/30/18 1945  --   108  18   77/52  93 %  --  --   08/30/18 1938  --   109  19   77/52  92 %  None (Room air)  Lying   08/30/18 1845  --   110  19   83/48  92 %         Abnormal Labs:    08/30/18 1848    Sodium 136 - 145 mmol/L 130     Potassium 3 5 - 5 3 mmol/L 4 7    Comment: Slightly Hemolyzed; Results May be Affected   Chloride 100 - 108 mmol/L 95     CO2 21 - 32 mmol/L 20     ANION GAP 4 - 13 mmol/L 15     BUN 5 - 25 mg/dL 51     Creatinine 0 60 - 1 30 mg/dL 5 04        08/30/18 1848    WBC 4 31 - 10 16 Thousand/uL 24 08     RBC 3 88 - 5 62 Million/uL 4 66    Hemoglobin 12 0 - 17 0 g/dL 12 7    Hematocrit 36 5 - 49 3 % 39 3    RDW 11 6 - 15 1 % 16 3     MPV 8 9 - 12 7 fL 10 4    Platelets 487 - 917 Thousands/uL 512        08/31/18 0516    Phosphorus 2 7 - 4 5 mg/dL 4 6        08/31/18 0516    Magnesium 1 6 - 2 6 mg/dL 1 1         Diagnostic Test Results: CT Abd/ Pelvis - Postsurgical changes with near complete resolution of small fluid collections as described  No bowel obstruction  No fluid collection to suggest abscess  Stable gastric distention of uncertain etiology    Stable 2 cm complex left renal cyst     ED Treatment:   Medication Administration from 08/30/2018 1820 to 08/31/2018 0101       Date/Time Order Dose Route Action     08/30/2018 1847 sodium chloride 0 9 % bolus 1,000 mL 1,000 mL Intravenous New Bag     08/30/2018 1853 ondansetron (ZOFRAN) injection 4 mg 4 mg Intravenous Given     08/30/2018 1854 albuterol inhalation solution 5 mg 5 mg Nebulization Given     08/30/2018 1854 ipratropium (ATROVENT) 0 02 % inhalation solution 0 5 mg 0 5 mg Nebulization Given     08/30/2018 1949 sodium chloride 0 9 % bolus 1,000 mL 1,000 mL Intravenous New Bag     08/30/2018 2115 pantoprazole (PROTONIX) 80 mg in sodium chloride 0 9 % 100 mL IVPB 80 mg Intravenous New Bag     08/30/2018 2124 nicotine (NICODERM CQ) 14 mg/24hr TD 24 hr patch 14 mg 14 mg Transdermal Medication Applied     08/30/2018 2316 sodium chloride 0 9 % infusion 125 mL/hr Intravenous New Bag     08/31/2018 0002 oxyCODONE (ROXICODONE) IR tablet 5 mg 5 mg Oral Given          Past Medical/Surgical History:    Active Ambulatory Problems     Diagnosis Date Noted    [facility] ) 10/22/2016    Spinal stenosis of cervical region 12/07/2016    Hypertension 12/07/2016    Pericarditis 12/07/2016    Coronary artery disease involving native coronary artery of native heart without angina pectoris 12/07/2016    Leukocytosis 12/07/2016    Asthma 10/19/2013    Atherosclerosis of coronary artery 06/06/2016    COPD (chronic obstructive pulmonary disease) ([facility] ) 01/06/2017    Dyslipidemia 10/19/2013    Alcohol abuse 05/15/2018    Cigarette nicotine dependence without complication 65/77/7241    Alcohol dependence with withdrawal ([facility] ) 05/17/2018    Acute respiratory insufficiency 05/19/2018    Agitation 05/19/2018    Heart failure, systolic, due to idiopathic cardiomyopathy ([facility] ) 05/21/2018    Ileostomy in place ([facility] ) 05/23/2018    Moderate protein-calorie malnutrition ([facility] ) 05/23/2018    Anemia 05/24/2018    Hypocalcemia 05/24/2018    Perforation of colon ([facility] )     Hypotension 06/13/2018    Intra-abdominal abscess ([address] ) 07/08/2018    EDBBY (acute kidney injury) ([facility] ) 07/08/2018    Abdominal pain 08/07/2018    Ambulatory dysfunction 08/07/2018    Presence of drug-eluting stent in right coronary artery 08/24/2018    [facility]oxysmal atrial fibrillation ([facility] ) 08/24/2018     Resolved Ambulatory Problems     Diagnosis Date Noted    Septic shock ([place] [address] ) 05/15/2018    Lactic acidosis 05/15/2018    High anion gap metabolic acidosis 54/07/7011    Acute kidney failure ([place] [address] ) 05/15/2018    Transaminitis 05/15/2018    Elevated troponin 05/15/2018    Hypokalemia 05/15/2018    Hyponatremia 05/15/2018    Bronchospasm, acute 05/19/2018    Free intraperitoneal air 05/15/2018    Encephalopathy 06/14/2018    Dehydration 06/14/2018    Sepsis ([place] [address] ) 06/14/2018    UTI (urinary tract infection) 07/09/2018     Past Medical History:   Diagnosis Date    Anxiety     Asthma     Cardiac disease     Chest pain     Colitis     Colon polyps     COPD (chronic obstructive pulmonary disease) ([facility])     Coronary artery disease     Diverticulitis     Esophageal reflux     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[facility])     Hyperlipidemia     Hypertension     IBD (inflammatory bowel disease)     Myocardial infarction ([place] [address] )     Old myocardial infarction     Perforation of colon ([facility])     Type 2 diabetes, diet controlled ([place] [address] )     Ulcerative colitis ([place] [address] )        Admitting Diagnosis: Dehydration [E86 0]  Vomiting blood [K92 0]  Nausea and vomiting [R11 2]  DEBBY (acute kidney injury) ([place] [address] ) [N17 9]  Hematemesis with nausea [K92 0]    Age/Sex: 66 y o  male    Assessment/Plan:   59y male with Hematemesis/ Acute Kidney Injury/ Hypotension/ COPD/ Hypercalcemia/ Volume overload  Vomited brown fluid  R/O GI Bleed  Gastroenterology cons  NPO, Iv Protonix, Zofran q4h, IVF @ 100 ml/hr  EKG ordered for QTC monitoring  Baseline creat closer to 1 with today @ 5  Monitor fluid resuscitation  Hold lisinopril for KI  Home O2 2L at night  Hypercalcemic 10 8  Nephrology cons:  Pt creatinine earlier this month on discharge from the hospital was 1 so this is definitely an acute renal failure  Monitor on IVF  Change IVF to bicarbonate given non gap acidosis  Renal US  Check urine sodium to assess for effective volume depletion  Monitor labs  Off Ace-inhibitor  Hypercalcemia may be due to volume depletion        Admission Orders:  GI, Non Ulcerogenic  US kidney and bladder  Nephrology cons    Scheduled Meds:   Current Facility-Administered Medications:  amiodarone 200 mg Oral Daily   atorvastatin 40 mg Oral Daily With Dinner   levalbuterol      levalbuterol 1 25 mg Nebulization BID   mesalamine 500 mg Oral TID   nicotine 1 patch Transdermal Daily   nicotine 14 mg Transdermal Once   nystatin  Topical BID   pantoprazole 40 mg Intravenous Q12H [address]   sodium chloride 3 mL Nebulization BID   tiotropium 18 mcg Inhalation Daily     Continuous Infusions:   Sodium bicarbonate 75 mEq in sodium chloride 0 45% 150 ml/hr Start 08/31/18  @1219   sodium chloride 125 mL/hr Last Rate: 125 mL/hr (08/30/18 2316)  Stopped 8/31 @ 0949     PRN Meds:   albuterol    albuterol    ondansetronIv x1    oxyCODONEb po x1

## 2018-08-31 NOTE — PROGRESS NOTES
[facility] Admission Note   SOD Team B           Levon Hampton 66 y o  male [id number]       Patient seen and examined  Reviewed H&P per Dr Trujillo Francois  Agree with the assessment and plan except otherwise stated  Assessment/Plan: Principal Problem:    Hematemesis with nausea  Active Problems:    [facility] disease ([facility] [facility] )    Hypertension    Coronary artery disease involving native coronary artery of native heart without angina pectoris    Leukocytosis    COPD (chronic obstructive pulmonary disease) ([facility]    Cigarette nicotine dependence without complication    Heart failure, systolic, due to idiopathic cardiomyopathy ([facility] )    Ileostomy in place ([facility] [facility] )    Hypotension    DEBBY (acute kidney injury) ([facility] [facility] )    Abdominal pain    Ambulatory dysfunction    Presence of drug-eluting stent in right coronary artery    Paroxysmal atrial fibrillation ([facility] [facility] )    Hypercalcemia         Disposition:  OBSERVATION    Expected LOS: <2 ARGENIS Rae    Internal Medicine PGY-2  8/30/2018 11:34 PM

## 2018-08-31 NOTE — H&P
H&P Exam - Colin Moy 66 y o  male MRN: [id number]    Unit/Bed#: ED 21 Encounter: [id number]      Assessment/Plan     Hematemesis  53-year-old male with a history of inflammatory bowel disease and smoking history  Per the patient, he vomits brown fluid regardless of what he is taking an, including if he has only had water between episodes of emesis  He states that it does not have a coffee-ground appearance  Will need to rule out upper GI bleed, seems to have a somewhat low likelihood given that he does have a normal hemoglobin (in the setting of volume contraction) but will consult GI   - IV Protonix  - consult order placed  - Zofran q 4 PRN, EKG ordered for QTC monitoring  - diet NPO  - normal saline at 100 an hour    Acute kidney injury  Baseline creatinine difficult to assess given the patient appears to have had multiple Aks eyes with past few years, however his baseline does appear to be closer to 1  He presents with a creatinine of 5 today and BUN of 51  Given his volume depletion secondary to profuse vomiting, this is likely pre renal   Will monitor response to fluid resuscitation  Hypotension  Likely secondary to volume depletion  Lisinopril being held for a KI  Continue to monitor  CAD  Status post drug-eluting stent  Holding aspirin  Continue atorvastatin provided the patient can tolerate p  o  sips with meds  COPD  Per the patient, he uses his nebulizer and inhalers daily  He uses supplemental oxygen 2 L at night  History of paroxysmal Afib  On amiodarone  Will obtain EKG in the setting of amiodarone plus Zofran as potential QTC prolonging agents  Regular rate and rhythm on auscultation however heart sounds are distant  Telemetry not ordered at this time  Hypercalcemia  Patient noted to be hypercalcemic to 10 8  In the setting of multiple lab derangements likely related to dehydration, it is difficult to assess at this time  Will recheck    If it does not resolve and another etiology is not found, this could be the cause of of vomiting  Hypercalcemia could have been caused by mineral bone disease or kidney disease, paraneoplastic syndrome, hyperparathyroidism  Leukocytosis  Likely secondary to volume contraction and acute stress reaction  Patient is afebrile  Continue to monitor  History of Present Illness     HPI:  Amie Mendoza is a 66 y o  male with a history of tobacco abuse coronary artery disease status post drug-eluting stent, inflammatory bowel disease with ileostomy placed May 2018, COPD with supplemental oxygen at night who presents with 1 day of nausea and vomiting brown colored vomit  He says that the vomit is not black and does not have a coffee-ground appearance, but that it comes up dark brown even when he has only had water  He has never had this happen before  He states that it started this morning  This has been associated with dizziness and lightheadedness  He had been feeling well prior to this  No recent travel  No sick contacts  He denies fever or chills  He does note that he had some slightly increased ostomy output before the vomiting started but since then it has decreased  However he is still having output  He notes that he has not urinated since yesterday morning  He denies difficulty swallowing  He says that he has a feeling of heartburn or reflux but that he does not have a history of this  In the ED, he received 2 fluid boluses  He has continued to feel nausea and some reflux but has not vomited  He has a leukocytosis to 24 and thrombocytosis to 512  Of note, his hemoglobin is normal at 0 7  He also has a significant elevation in creatinine to 5 04  His lipase was negative at 233  He had a CT scan that was notable for postsurgical changes with near complete resolution of small fluid collections  No bowel obstruction or fluid collection to suggest abscess    He has gastric distention which is stable from prior CT     Review of Systems   Constitutional: Negative for appetite change, chills and fever  HENT: Negative for trouble swallowing  Eyes: Negative  Respiratory: Negative for cough, shortness of breath and wheezing  Cardiovascular: Positive for chest pain  Gastrointestinal: Positive for abdominal pain, nausea and vomiting  Negative for blood in stool and constipation  Endocrine: Negative  Genitourinary: Positive for decreased urine volume  Negative for dysuria and hematuria  Musculoskeletal: Positive for gait problem  Skin: Negative  Allergic/Immunologic: Negative  Neurological: Positive for weakness and light-headedness  Hematological: Negative  Psychiatric/Behavioral: Negative  Historical Information   Past Medical History:   Diagnosis Date    Anxiety     Asthma     Cardiac disease     Chest pain     Colitis     Colon polyps     COPD (chronic obstructive pulmonary disease) (HCC)     Coronary artery disease     Diverticulitis     Esophageal reflux     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HCC)     Hyperlipidemia     Hypertension     IBD (inflammatory bowel disease)     [facility]     Old myocardial infarction     Perforation of colon (HCC)     Type 2 diabetes, diet controlled ([facility] )     Ulcerative colitis ([facility] )      Past Surgical History:   Procedure Laterality Date    ANGIOPLASTY      COLON SURGERY      COLONOSCOPY N/A 10/24/2016    Procedure: COLONOSCOPY;  Surgeon: Maria Teresa Alexandra MD;  Location: [facility]; Service:     CORONARY ANGIOPLASTY WITH STENT PLACEMENT      ESOPHAGOGASTRODUODENOSCOPY N/A 10/24/2016    Procedure: ESOPHAGOGASTRODUODENOSCOPY (EGD); Surgeon: Maria Teresa Alexandra MD;  Location: [facility];   Service:     EXPLORATORY LAPAROTOMY W/ BOWEL RESECTION N/A 5/22/2018    Procedure: EXPLORATORY LAPAROTOMY, ILIOCOLECTOMY, ILIOCOLONIC ANASTAMOSIS, LOOP ILIOSTOMY, REPAIR OF SEROSAL TEAR, EXTENSIVE LYSIS OF ADHESIONS, WOUND VAC PLACEMENT; Surgeon: Marisela Vazquez MD;  Location: [facility];  Service: Colorectal    HEMICOLECTOMY      OTHER SURGICAL HISTORY      stent indications acute myocardial infarction    FL COLONOSCOPY FLX DX W/COLLJ SPEC WHEN PFRMD N/A 2/6/2018    Procedure: COLONOSCOPY;  Surgeon: Marisela Vazquez MD;  Location: [facility]; Service: Colorectal    TONSILLECTOMY       Social History   History   Alcohol Use No     Comment: pt "quit about 4 months ago"     History   Drug Use No     History   Smoking Status    Current Every Day Smoker    Packs/day: 0 00    Years: 5 00    Types: Cigars   Smokeless Tobacco    Never Used     Comment: 3 cigars per day     Family History: non-contributory    Meds/Allergies   all medications and allergies reviewed  Allergies   Allergen Reactions    Medical Tape     Other      Brown cloth band aids       Latex Rash       Objective   Vitals: Blood pressure (!) 69/47, pulse 98, temperature 98 7 °F (37 1 °C), temperature source Tympanic, resp  rate 20, SpO2 93 %  Intake/Output Summary (Last 24 hours) at 08/30/18 2312  Last data filed at 08/30/18 2125   Gross per 24 hour   Intake             3000 ml   Output                0 ml   Net             3000 ml       Invasive Devices     Peripheral Intravenous Line            Peripheral IV 08/30/18 Left Antecubital less than 1 day          Drain            Colostomy   days                Physical Exam   Constitutional: He is oriented to person, place, and time  No distress  HENT:   Head: Normocephalic and atraumatic  Eyes: Conjunctivae and EOM are normal    Neck: Normal range of motion  Cardiovascular:   Distant heart sounds  Pulmonary/Chest: Effort normal and breath sounds normal  He has no wheezes  He has no rales  Abdominal: Soft  Bowel sounds are normal  There is generalized tenderness  There is guarding  Musculoskeletal: He exhibits no edema  Neurological: He is alert and oriented to person, place, and time     Skin: He is not diaphoretic  Lab Results: I have personally reviewed pertinent reports  Imaging: I have personally reviewed pertinent reports  EKG, Pathology, and Other Studies: I have personally reviewed pertinent reports        Code Status: Level 1 - Full Code  Advance Directive and Living Will:      Power of :    POLST:

## 2018-09-01 LAB
ANION GAP SERPL CALCULATED.3IONS-SCNC: 9 MMOL/L (ref 4–13)
BUN SERPL-MCNC: 54 MG/DL (ref 5–25)
CALCIUM SERPL-MCNC: 8.2 MG/DL (ref 8.3–10.1)
CHLORIDE SERPL-SCNC: 105 MMOL/L (ref 100–108)
CO2 SERPL-SCNC: 20 MMOL/L (ref 21–32)
CREAT SERPL-MCNC: 5.45 MG/DL (ref 0.6–1.3)
ERYTHROCYTE [DISTWIDTH] IN BLOOD BY AUTOMATED COUNT: 16 % (ref 11.6–15.1)
GFR SERPL CREATININE-BSD FRML MDRD: 11 ML/MIN/1.73SQ M
GLUCOSE SERPL-MCNC: 89 MG/DL (ref 65–140)
HCT VFR BLD AUTO: 29.8 % (ref 36.5–49.3)
HGB BLD-MCNC: 9.1 G/DL (ref 12–17)
MCH RBC QN AUTO: 27.2 PG (ref 26.8–34.3)
MCHC RBC AUTO-ENTMCNC: 30.5 G/DL (ref 31.4–37.4)
MCV RBC AUTO: 89 FL (ref 82–98)
PLATELET # BLD AUTO: 278 THOUSANDS/UL (ref 149–390)
PMV BLD AUTO: 11 FL (ref 8.9–12.7)
POTASSIUM SERPL-SCNC: 4.1 MMOL/L (ref 3.5–5.3)
RBC # BLD AUTO: 3.34 MILLION/UL (ref 3.88–5.62)
SODIUM SERPL-SCNC: 134 MMOL/L (ref 136–145)
WBC # BLD AUTO: 10.72 THOUSAND/UL (ref 4.31–10.16)

## 2018-09-01 PROCEDURE — 99254 IP/OBS CNSLTJ NEW/EST MOD 60: CPT | Performed by: INTERNAL MEDICINE

## 2018-09-01 PROCEDURE — 85027 COMPLETE CBC AUTOMATED: CPT | Performed by: INTERNAL MEDICINE

## 2018-09-01 PROCEDURE — C9113 INJ PANTOPRAZOLE SODIUM, VIA: HCPCS | Performed by: INTERNAL MEDICINE

## 2018-09-01 PROCEDURE — 94760 N-INVAS EAR/PLS OXIMETRY 1: CPT

## 2018-09-01 PROCEDURE — 99233 SBSQ HOSP IP/OBS HIGH 50: CPT | Performed by: INTERNAL MEDICINE

## 2018-09-01 PROCEDURE — 94640 AIRWAY INHALATION TREATMENT: CPT

## 2018-09-01 PROCEDURE — 80048 BASIC METABOLIC PNL TOTAL CA: CPT | Performed by: INTERNAL MEDICINE

## 2018-09-01 RX ADMIN — MESALAMINE 500 MG: 250 CAPSULE ORAL at 09:40

## 2018-09-01 RX ADMIN — ASPIRIN 81 MG 81 MG: 81 TABLET ORAL at 09:15

## 2018-09-01 RX ADMIN — SODIUM CHLORIDE 500 ML: 0.9 INJECTION, SOLUTION INTRAVENOUS at 04:58

## 2018-09-01 RX ADMIN — ATORVASTATIN CALCIUM 40 MG: 40 TABLET, FILM COATED ORAL at 17:20

## 2018-09-01 RX ADMIN — SODIUM BICARBONATE 150 ML/HR: 84 INJECTION, SOLUTION INTRAVENOUS at 19:45

## 2018-09-01 RX ADMIN — LEVALBUTEROL HYDROCHLORIDE 1.25 MG: 1.25 SOLUTION, CONCENTRATE RESPIRATORY (INHALATION) at 20:03

## 2018-09-01 RX ADMIN — MESALAMINE 500 MG: 250 CAPSULE ORAL at 20:27

## 2018-09-01 RX ADMIN — MESALAMINE 500 MG: 250 CAPSULE ORAL at 17:20

## 2018-09-01 RX ADMIN — NICOTINE 1 PATCH: 14 PATCH, EXTENDED RELEASE TRANSDERMAL at 09:14

## 2018-09-01 RX ADMIN — OXYCODONE HYDROCHLORIDE 10 MG: 10 TABLET ORAL at 09:15

## 2018-09-01 RX ADMIN — SODIUM BICARBONATE 150 ML/HR: 84 INJECTION, SOLUTION INTRAVENOUS at 04:55

## 2018-09-01 RX ADMIN — LEVALBUTEROL HYDROCHLORIDE 1.25 MG: 1.25 SOLUTION, CONCENTRATE RESPIRATORY (INHALATION) at 07:09

## 2018-09-01 RX ADMIN — SODIUM BICARBONATE 150 ML/HR: 84 INJECTION, SOLUTION INTRAVENOUS at 11:47

## 2018-09-01 RX ADMIN — ISODIUM CHLORIDE 3 ML: 0.03 SOLUTION RESPIRATORY (INHALATION) at 20:03

## 2018-09-01 RX ADMIN — OXYCODONE HYDROCHLORIDE 10 MG: 10 TABLET ORAL at 03:35

## 2018-09-01 RX ADMIN — SODIUM CHLORIDE 250 ML: 0.9 INJECTION, SOLUTION INTRAVENOUS at 15:41

## 2018-09-01 RX ADMIN — SODIUM CHLORIDE 250 ML: 0.9 INJECTION, SOLUTION INTRAVENOUS at 17:00

## 2018-09-01 RX ADMIN — NYSTATIN 1 APPLICATION: 100000 OINTMENT TOPICAL at 09:40

## 2018-09-01 RX ADMIN — GABAPENTIN 100 MG: 100 CAPSULE ORAL at 20:26

## 2018-09-01 RX ADMIN — NYSTATIN: 100000 OINTMENT TOPICAL at 17:20

## 2018-09-01 RX ADMIN — AMIODARONE HYDROCHLORIDE 200 MG: 200 TABLET ORAL at 09:15

## 2018-09-01 RX ADMIN — PANTOPRAZOLE SODIUM 40 MG: 40 INJECTION, POWDER, FOR SOLUTION INTRAVENOUS at 09:15

## 2018-09-01 RX ADMIN — GABAPENTIN 100 MG: 100 CAPSULE ORAL at 17:20

## 2018-09-01 RX ADMIN — OXYCODONE HYDROCHLORIDE 10 MG: 10 TABLET ORAL at 19:29

## 2018-09-01 RX ADMIN — GABAPENTIN 100 MG: 100 CAPSULE ORAL at 09:15

## 2018-09-01 RX ADMIN — ISODIUM CHLORIDE 3 ML: 0.03 SOLUTION RESPIRATORY (INHALATION) at 07:09

## 2018-09-01 RX ADMIN — PANTOPRAZOLE SODIUM 40 MG: 40 INJECTION, POWDER, FOR SOLUTION INTRAVENOUS at 20:27

## 2018-09-01 RX ADMIN — TIOTROPIUM BROMIDE 18 MCG: 18 CAPSULE ORAL; RESPIRATORY (INHALATION) at 09:40

## 2018-09-01 NOTE — PROGRESS NOTES
Pt manual blood pressure 78/46  Pt asymptomatic  Eugune Notice from SOD notified  Will continue to monitor

## 2018-09-01 NOTE — PROGRESS NOTES
Interval progress note: Calls from RN about blood pressures 78/46 and later, 68/42  Both manual  Pt asymptomatic, A+Ox4  Per Dr Nicole Anderson note from today and discharge summary from last admission, patient will likely benefit from vigorous volume repletion with fluids so 500cc NS bolus ordered  Bicarb drip running at 150/hr  Continue to monitor

## 2018-09-01 NOTE — PROGRESS NOTES
NEPHROLOGY PROGRESS NOTE    Amanda Bravo 66 y o  male MRN: [id number]  Unit/Bed#: - Encounter: [id number]  Reason for Consult:  Acute renal failure    The patient had Hernandez catheter placed due to difficulty urinating  He has noticed that the urine output improved with that  Other than that he is eating offers no other complaints  ASSESSMENT/PLAN:  1  Renal  The patient has acute renal failure he was recently in the hospital with normal creatinine so this was all new  Urine fractional secretion of sodium suggested severe pre renal azotemia as it was less than 1%  Urine output is starting to  is Hernandez catheter in but creatinine is about the same  I did not expected to improve into urine output improved for now continue IV fluid support with bicarbonate given metabolic acidosis  Patient is stable no indication for dialysis at the present time  Continue IV fluids  Monitor urine output  BMP a m  SUBJECTIVE:  Review of Systems   Constitution: Negative for chills and fever  HENT: Negative  Eyes: Negative  Cardiovascular: Negative  Negative for chest pain, dyspnea on exertion and orthopnea  Respiratory: Negative  Negative for cough and shortness of breath  Gastrointestinal: Negative  Negative for bloating, abdominal pain and diarrhea  Genitourinary: Hernandez catheter    Neurological: Negative  OBJECTIVE:  Current Weight: Weight - Scale: 57 1 kg (125 lb 12 8 oz)  Vitals:Temp (24hrs), Av 2 °F (36 8 °C), Min:97 8 °F (36 6 °C), Max:98 5 °F (36 9 °C)  Current: Temperature: 98 4 °F (36 9 °C)   Blood pressure (!) 87/54, pulse 88, temperature 98 4 °F (36 9 °C), temperature source Oral, resp  rate 16, height 5' 6" (1 676 m), weight 57 1 kg (125 lb 12 8 oz), SpO2 93 %  , Body mass index is 20 3 kg/m²        Intake/Output Summary (Last 24 hours) at 18 1130  Last data filed at 18 1024   Gross per 24 hour   Intake              940 ml   Output              825 ml   Net              115 ml       Physical Exam: BP (!) 87/54 (BP Location: Right arm)   Pulse 88   Temp 98 4 °F (36 9 °C) (Oral)   Resp 16   Ht 5' 6" (1 676 m)   Wt 57 1 kg (125 lb 12 8 oz)   SpO2 93%   BMI 20 30 kg/m²   Physical Exam   Constitutional: He is oriented to person, place, and time  No distress  HENT:   Mouth/Throat: No oropharyngeal exudate  Eyes: No scleral icterus  Neck: Neck supple  No JVD present  Cardiovascular: Normal rate and regular rhythm  Exam reveals no friction rub  No edema   Pulmonary/Chest: Effort normal and breath sounds normal  No respiratory distress  He has no wheezes  He has no rales  Abdominal: Soft  Bowel sounds are normal  He exhibits no distension  There is no tenderness  There is no rebound  Neurological: He is alert and oriented to person, place, and time         Medications:    Current Facility-Administered Medications:     albuterol (PROVENTIL HFA,VENTOLIN HFA) inhaler 2 puff, 2 puff, Inhalation, Q4H PRN, Rishabh Mode, DO    albuterol inhalation solution 2 5 mg, 2 5 mg, Nebulization, Q4H PRN, Rishabh Mode, DO    amiodarone tablet 200 mg, 200 mg, Oral, Daily, Rishabh Mode, DO, 200 mg at 09/01/18 0915    aspirin chewable tablet 81 mg, 81 mg, Oral, Daily, Luciano Echevarria MD, 81 mg at 09/01/18 0915    atorvastatin (LIPITOR) tablet 40 mg, 40 mg, Oral, Daily With Dinner, Rishabh Mode, DO, 40 mg at 08/31/18 1723    gabapentin (NEURONTIN) capsule 100 mg, 100 mg, Oral, TID, Beryle Dolores, MD, 100 mg at 09/01/18 0915    levalbuterol (XOPENEX) inhalation solution 1 25 mg, 1 25 mg, Nebulization, BID, Rachna Montes MD, 1 25 mg at 09/01/18 0709    mesalamine (PENTASA) ER capsule 500 mg, 500 mg, Oral, TID, Rishabh Gamez, DO, 500 mg at 09/01/18 0940    nicotine (NICODERM CQ) 14 mg/24hr TD 24 hr patch 1 patch, 1 patch, Transdermal, Daily, Rishabh Gamez, DO, 1 patch at 09/01/18 0914    nystatin (MYCOSTATIN) ointment, , Topical, BID, Rishabh Mode, DO, 1 application at 83/73/67 0940    ondansetron (ZOFRAN) injection 4 mg, 4 mg, Intravenous, Q4H PRN, Vamsi Chung DO, 4 mg at 08/31/18 0219    oxyCODONE (ROXICODONE) immediate release tablet 10 mg, 10 mg, Oral, Q4H PRN, Celi Peña MD, 10 mg at 09/01/18 0915    oxyCODONE (ROXICODONE) IR tablet 5 mg, 5 mg, Oral, Q4H PRN, Celi Peña MD, 5 mg at 08/31/18 1225    pantoprazole (PROTONIX) injection 40 mg, 40 mg, Intravenous, Q12H [address], Celi Peña MD, 40 mg at 09/01/18 0915    pneumococcal 23-valent polysaccharide vaccine (PNEUMOVAX-23) injection 0 5 mL, 0 5 mL, Subcutaneous, Prior to discharge, Jerel Matos MD    sodium bicarbonate 75 mEq in sodium chloride 0 45 % 1,000 mL infusion, 150 mL/hr, Intravenous, Continuous, Trung Washburn MD, Last Rate: 150 mL/hr at 09/01/18 0455, 150 mL/hr at 09/01/18 0455    sodium chloride 0 9 % inhalation solution 3 mL, 3 mL, Nebulization, BID, Jerel Matos MD, 3 mL at 09/01/18 0709    tiotropium (SPIRIVA) capsule for inhaler 18 mcg, 18 mcg, Inhalation, Daily, Vamsi Chung DO, 18 mcg at 09/01/18 0940    Laboratory Results:  Lab Results   Component Value Date    WBC 10 72 (H) 09/01/2018    HGB 9 1 (L) 09/01/2018    HCT 29 8 (L) 09/01/2018    MCV 89 09/01/2018     09/01/2018     Lab Results   Component Value Date    GLUCOSE 92 07/08/2018    CALCIUM 8 2 (L) 09/01/2018     (L) 09/01/2018    K 4 1 09/01/2018    CO2 20 (L) 09/01/2018     09/01/2018    BUN 54 (H) 09/01/2018    CREATININE 5 45 (H) 09/01/2018     Lab Results   Component Value Date    CALCIUM 8 2 (L) 09/01/2018    PHOS 4 6 (H) 08/31/2018     No results found for: LABPROT

## 2018-09-01 NOTE — CONSULTS
Consultation - Cardiology   Donnie Corrigan 66 y o  male MRN: [id number]  Unit/Bed#: -01 Encounter: [id number]    Assessment/Plan     Assessment/Recommendations:  1  Hypotension: unclear etiology - would also look for sources of infection/sepsis with leukocytosis  Agree with continued IVF hydration  Hold all antihypertensives  2   H/o CAD with prior stent in RCA: stable and asymptomatic with ASA, statin  Troponin negative  3   Ischemic cardiomyopathy: EF 45% with wall motion abnormalities in distribution of prior MI  Volume status appears depleted - watch for volume accumulation as hydrating  4   Chronic systolic CHF: as above  5   DEBBY: continuing with work-up and IVF hydration  6   Leukocytosis: as above, would also search for sources of infection  7   Paroxysmal afib: maintaining SR on amiodarone - continue on this for now  Replete Magnesium to keep >2  History of Present Illness   Physician Requesting Consult: Nadira Daniels MD  Reason for Consult / Principal Problem: Management of complex cardiac history with DEBBY  HPI: Donnie Corrigan is a 66y o  year old male who presents with h/o CAD and prior MI of inferior wall s/p stent to RCA in 2012, ischemic cardiomyopathy (EF 89%), chronic systolic CHF, and paroxysmal afib on amiodarone and not on anticoagulation here for evaluation of brown vomit suspicious of hematemesis  Hgb has been stable and unlikely to be GI bleed in etiology, but also found to be in significant DEBBY - for which etiology is still being found  Found to be significantly prerenal, so started on IVF hydration to help with this, without significant change  Denies any chest pain, shortness of breath, lightheadedness, syncope, LE edema, orthopnea, or PND  He sees Dr Vania Pérez in the office for cardiac care      Inpatient consult to Cardiology  Consult performed by: Roldan Lyon ordered by: Mirtha Wren          Review of Systems Constitutional: Positive for fatigue  Negative for activity change, appetite change and fever  HENT: Negative for nosebleeds and sore throat  Eyes: Negative for photophobia and visual disturbance  Respiratory: Negative for cough, chest tightness, shortness of breath and wheezing  Cardiovascular: Negative for chest pain, palpitations and leg swelling  Gastrointestinal: Positive for nausea  Negative for abdominal pain, diarrhea and vomiting  Endocrine: Negative for polyuria  Genitourinary: Negative for dysuria, frequency and hematuria  Musculoskeletal: Negative for arthralgias, back pain and gait problem  Skin: Negative for pallor and rash  Neurological: Negative for dizziness, syncope, speech difficulty and light-headedness  Hematological: Does not bruise/bleed easily  Psychiatric/Behavioral: Negative for agitation, behavioral problems and confusion  Historical Information   Past Medical History:   Diagnosis Date    Anxiety     Asthma     Cardiac disease     Chest pain     Colitis     Colon polyps     COPD (chronic obstructive pulmonary disease) (HCC)     Coronary artery disease     Diverticulitis     Esophageal reflux     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HCC)     Hyperlipidemia     Hypertension     IBD (inflammatory bowel disease)     [facility] infarction [facility] )     Old myocardial infarction     Perforation of colon (HCC)     Type 2 diabetes, diet controlled [facility] )     Ulcerative colitis ([facility] )      Past Surgical History:   Procedure Laterality Date    ANGIOPLASTY      COLON SURGERY      COLONOSCOPY N/A 10/24/2016    Procedure: COLONOSCOPY;  Surgeon: Clemencia Bryan MD;  Location: [facility]; Service:     CORONARY ANGIOPLASTY WITH STENT PLACEMENT      ESOPHAGOGASTRODUODENOSCOPY N/A 10/24/2016    Procedure: ESOPHAGOGASTRODUODENOSCOPY (EGD); Surgeon: Clemencia Bryan MD;  [facility];   Service:     EXPLORATORY LAPAROTOMY W/ BOWEL RESECTION N/A 5/22/2018 Procedure: EXPLORATORY LAPAROTOMY, ILIOCOLECTOMY, ILIOCOLONIC ANASTAMOSIS, LOOP ILIOSTOMY, REPAIR OF SEROSAL TEAR, EXTENSIVE LYSIS OF ADHESIONS, WOUND VAC PLACEMENT;  Surgeon: Georgette Wolf MD;  Location: [facility];  Service: Colorectal    HEMICOLECTOMY      OTHER SURGICAL HISTORY      stent indications acute myocardial infarction    TN COLONOSCOPY FLX DX W/COLLJ SPEC WHEN PFRMD N/A 2/6/2018    Procedure: COLONOSCOPY;  Surgeon: Georgette Wolf MD;  [facility]: [facility];   Service: Colorectal    TONSILLECTOMY       History   Alcohol Use No     Comment: pt "quit about 4 months ago"     History   Drug Use No     History   Smoking Status    Current Every Day Smoker    Packs/day: 0 00    Years: 5 00    Types: Cigars   Smokeless Tobacco    Never Used     Comment: 3 cigars per day     Family History:   Family History   Problem Relation Age of Onset    Coronary artery disease Father     Crohn's disease Father     Stroke Father     Diabetes Family        Meds/Allergies   all current active meds have been reviewed and current meds:   Current Facility-Administered Medications   Medication Dose Route Frequency    albuterol (PROVENTIL HFA,VENTOLIN HFA) inhaler 2 puff  2 puff Inhalation Q4H PRN    albuterol inhalation solution 2 5 mg  2 5 mg Nebulization Q4H PRN    amiodarone tablet 200 mg  200 mg Oral Daily    aspirin chewable tablet 81 mg  81 mg Oral Daily    atorvastatin (LIPITOR) tablet 40 mg  40 mg Oral Daily With Dinner    gabapentin (NEURONTIN) capsule 100 mg  100 mg Oral TID    levalbuterol (XOPENEX) inhalation solution 1 25 mg  1 25 mg Nebulization BID    mesalamine (PENTASA) ER capsule 500 mg  500 mg Oral TID    nicotine (NICODERM CQ) 14 mg/24hr TD 24 hr patch 1 patch  1 patch Transdermal Daily    nystatin (MYCOSTATIN) ointment   Topical BID    ondansetron (ZOFRAN) injection 4 mg  4 mg Intravenous Q4H PRN    oxyCODONE (ROXICODONE) immediate release tablet 10 mg  10 mg Oral Q4H PRN    oxyCODONE (ROXICODONE) IR tablet 5 mg  5 mg Oral Q4H PRN    pantoprazole (PROTONIX) injection 40 mg  40 mg Intravenous Q12H [facility]    pneumococcal 23-valent polysaccharide vaccine (PNEUMOVAX-23) injection 0 5 mL  0 5 mL Subcutaneous Prior to discharge    sodium bicarbonate 75 mEq in sodium chloride 0 45 % 1,000 mL infusion  150 mL/hr Intravenous Continuous    sodium chloride 0 9 % inhalation solution 3 mL  3 mL Nebulization BID    tiotropium (SPIRIVA) capsule for inhaler 18 mcg  18 mcg Inhalation Daily     Allergies   Allergen Reactions    Medical Tape     Other      Brown cloth band aids       Latex Rash       Objective   Vitals: Blood pressure (!) 87/54, pulse 88, temperature 98 4 °F (36 9 °C), temperature source Oral, resp  rate 16, height 5' 6" (1 676 m), weight 57 1 kg (125 lb 12 8 oz), SpO2 93 %  Orthostatic Blood Pressures      Most Recent Value   Blood Pressure   87/54 filed at 09/01/2018 0759   Patient Position - Orthostatic VS  Lying filed at 09/01/2018 0759            Intake/Output Summary (Last 24 hours) at 09/01/18 1142  Last data filed at 09/01/18 1024   Gross per 24 hour   Intake              940 ml   Output              825 ml   Net              115 ml       Invasive Devices     Peripheral Intravenous Line            Peripheral IV 08/30/18 Left Antecubital 1 day    Peripheral IV 09/01/18 Right Arm less than 1 day          Drain            Colostomy   days    Urethral Catheter Latex 16 Fr  1 day                Physical Exam   Constitutional: He is oriented to person, place, and time  He appears well-developed and well-nourished  HENT:   Head: Normocephalic and atraumatic  Nose: Nose normal    Eyes: EOM are normal  Pupils are equal, round, and reactive to light  No scleral icterus  Neck: Normal range of motion  Neck supple  No JVD present  Cardiovascular: Normal rate and regular rhythm  Exam reveals no gallop and no friction rub  Murmur heard     Systolic murmur is present with a grade of 1/6   Pulmonary/Chest: Effort normal and breath sounds normal  No respiratory distress  He has no wheezes  He has no rales  Abdominal: Soft  Bowel sounds are normal  He exhibits no distension  There is no tenderness  Musculoskeletal: Normal range of motion  He exhibits no edema or deformity  Neurological: He is alert and oriented to person, place, and time  No cranial nerve deficit  Skin: Skin is warm and dry  No rash noted  He is not diaphoretic  Psychiatric: He has a normal mood and affect  His behavior is normal    Vitals reviewed  Lab Results:   I have personally reviewed pertinent lab results  CBC with diff:   Results from last 7 days  Lab Units 09/01/18  0426   WBC Thousand/uL 10 72*   RBC Million/uL 3 34*   HEMOGLOBIN g/dL 9 1*   HEMATOCRIT % 29 8*   MCV fL 89   MCH pg 27 2   MCHC g/dL 30 5*   RDW % 16 0*   MPV fL 11 0   PLATELETS Thousands/uL 278     CMP:   Results from last 7 days  Lab Units 09/01/18  0426  08/30/18  1848   SODIUM mmol/L 134*  < > 130*   POTASSIUM mmol/L 4 1  < > 4 7   CHLORIDE mmol/L 105  < > 95*   CO2 mmol/L 20*  < > 20*   BUN mg/dL 54*  < > 51*   CREATININE mg/dL 5 45*  < > 5 04*   CALCIUM mg/dL 8 2*  < > 10 8*   AST U/L  --   --  10   ALT U/L  --   --  21   ALK PHOS U/L  --   --  133*   EGFR ml/min/1 73sq m 11  < > 12   < > = values in this interval not displayed    Troponin:   0  Lab Value Date/Time   TROPONINI <0 02 08/07/2018 0042   TROPONINI <0 02 06/13/2018 1512   TROPONINI 0 12 (H) 05/20/2018 1753   TROPONINI 0 17 (H) 05/20/2018 1505   TROPONINI 0 17 (H) 05/20/2018 1214   TROPONINI 0 29 (H) 05/19/2018 0438   TROPONINI 0 30 (H) 05/19/2018 0154   TROPONINI 0 32 (H) 05/18/2018 2229   TROPONINI 0 32 (H) 05/18/2018 1909   TROPONINI 1 15 (H) 05/16/2018 1416   TROPONINI 1 24 (H) 05/16/2018 0522   TROPONINI 1 16 (H) 05/16/2018 0209   TROPONINI 1 09 (H) 05/16/2018 0053   TROPONINI 0 85 (H) 05/15/2018 1935   TROPONINI 0 72 (H) 05/15/2018 1728   TROPONINI 0 47 (H) 05/15/2018 1304   TROPONINI 0 02 12/07/2016 1931   TROPONINI 0 04 (H) 12/07/2016 0907   TROPONINI <0 04 05/22/2015 0123     BNP:   Results from last 7 days  Lab Units 09/01/18  0426   SODIUM mmol/L 134*   POTASSIUM mmol/L 4 1   CHLORIDE mmol/L 105   CO2 mmol/L 20*   BUN mg/dL 54*   CREATININE mg/dL 5 45*   CALCIUM mg/dL 8 2*   EGFR ml/min/1 73sq m 11     Coags:     TSH:     Magnesium:   Results from last 7 days  Lab Units 08/31/18  0516   MAGNESIUM mg/dL 1 1*     Lipid Profile:     Imaging: I have personally reviewed pertinent reports     and I have personally reviewed pertinent films in PACS  EKG: Sinus tachycardia with pulm disease pattern and incomplete RBBB

## 2018-09-01 NOTE — PROGRESS NOTES
Pt BP with automatic cuff 76/48  Manual BP 68/42  Pulse 88  Pt asymptomatic  Pt urine output from hernandez cath is 20ml/hour  Dawson Priest from [facility] notified  Will start fluid bolus  Will continue to monitor

## 2018-09-01 NOTE — PROGRESS NOTES
IM Residency Progress Note   Unit/Bed#: -01 Encounter: [id number]  SOD Team B       Fran eHrnandez 66 y o  male [id number]    Hospital Stay Days: 1      Assessment/Plan:    Principal Problem:    Hematemesis with nausea  Active Problems:    Crohn disease ([facility] )    Hypertension    Coronary artery disease involving native coronary artery of native heart without angina pectoris    Leukocytosis    COPD (chronic obstructive pulmonary disease) ([facility])    Cigarette nicotine dependence without complication    Heart failure, systolic, due to idiopathic cardiomyopathy ([facility] )    Ileostomy in place ([facility] )    Hypotension    MATIAS (acute kidney injury) ([place] [address] )    Abdominal pain    Ambulatory dysfunction    Presence of drug-eluting stent in right coronary artery    Paroxysmal atrial fibrillation ([facility])    Hypercalcemia   1  MATIAS  - creatinine worsening today to 5 45 from 5 24  - Hernandez catheter in place is 175 cc urine from last night until 7 o'clock this morning per nursing  - renal ultrasound report pending will discuss with Radiology today  - nephrology  recommending continue bicarb drip at 75 cc/hr, appreciate further recommendations  - Still Anuric  - concern for obstructive uropathy given history of Crohn's disease    2  Concern for hematemesis  - reported dark vomiting which is resolved now  - hemoglobin 9 1 from 9 8  - will continue with Protonix IV 40 mg Q 12 hours  3  Crohn's disease  - status post ileostomy   -Mesalamine 500 mg t i d   - nystatin cream  4  Hypertension  - now hypotension requiring several boluses  - 500 mL normal saline bolus given overnight for blood pressure in the 70s  - will continue with gentle boluses as needed for hypertension  - telemonitor, and insert another peripheral IV in anticipation of hypovolemic shock  5  Coronary artery disease  - status post stent  - continue aspirin 81 mg and atorvastatin 40 mg  - cannot tolerate beta blocker or Ace secondary to his Matias and hypotension  6    Systolic heart failure due  to idiopathic cardiomyopathy   - not in acute exacerbation  - continue monitor  7  Atrial fibrillation  - amiodarone 200 mg daily  8  Hypercalcemia  -  continue to monitor  9  COPD  - levalbuterol  -Tiotropium  10  Nicotine dependence  - nicotine patch      Disposition:  Discharged pending acute kidney injury workup    Subjective:   Patient seen and examined at his bedside this morning  Overnight he developed hypotension requiring 500 bolus normal saline  He reported slept 2 hours last night, attributed to his baseline  Urine output is still decreased 175 cc from last night until 7 o'clock this morning per nursing  He added still continued to have abdominal discomfort around his ileostomy  His nausea reported as improved and he can eat now  Vitals: Temp (24hrs), Av 2 °F (36 8 °C), Min:97 8 °F (36 6 °C), Max:98 5 °F (36 9 °C)  Current: Temperature: 98 5 °F (36 9 °C)  Vitals:    18 0005 18 0429 18 0452 18 0709   BP: (!) 78/46 (!) 76/48 (!) 68/42    BP Location: Right arm Right arm Right arm    Pulse: 94 89     Resp: 18      Temp: 98 5 °F (36 9 °C)      TempSrc: Oral      SpO2: 94%   95%   Weight:       Height:        Body mass index is 20 3 kg/m²  I/O last 24 hours: In: 5 [P O :720]  Out: 610 [Urine:335; Stool:275]      Physical Exam: BP (!) 87/54 (BP Location: Right arm)   Pulse 88   Temp 98 4 °F (36 9 °C) (Oral)   Resp 16   Ht 5' 6" (1 676 m)   Wt 57 1 kg (125 lb 12 8 oz)   SpO2 93%   BMI 20 30 kg/m²   General appearance: alert and oriented, in no acute distress  Head: Normocephalic, without obvious abnormality, atraumatic  Throat: lips, mucosa, and tongue normal; teeth and gums normal  Neck: no adenopathy  Lungs: Wheezing, diminished air entry bilaterally  Heart: irregularly irregular rhythm, no murmurs or gallop pulses very weak and thready    Abdomen: Ileostomy with dark brown liquid material in the right lower quadrant, mild tenderness around ileostomy, surgical incision vertical midline healed with a scab  Extremities: No edema  Lymph nodes: No cervical lymphadenopathy  Neurologic: Grossly normal     Invasive Devices     Peripheral Intravenous Line            Peripheral IV 08/30/18 Left Antecubital 1 day          Drain            Colostomy   days    Urethral Catheter Latex 16 Fr  less than 1 day                          Labs:   Recent Results (from the past 24 hour(s))   Urinalysis with microscopic    Collection Time: 08/31/18 10:23 AM   Result Value Ref Range    Clarity, UA Cloudy     Color, UA Dk Yellow     Specific [place], UA 1 021 1 003 - 1 030    pH, UA 5 0 4 5 - 8 0    Glucose, UA Negative Negative mg/dl    Ketones, UA Trace (A) Negative mg/dl    Blood, UA Negative Negative    Protein, UA Trace (A) Negative mg/dl    Nitrite, UA Negative Negative    Bilirubin, UA Interference- unable to analyze (A) Negative    Urobilinogen, UA 0 2 0 2, 1 0 E U /dl E U /dl    Leukocytes, UA Small (A) Negative    WBC, UA 2-4 (A) None Seen, 0-5, 5-55, 5-65 /hpf    RBC, UA None Seen None Seen, 0-5 /hpf    Hyaline Casts, UA 10-25 (A) None Seen /lpf    Bacteria, UA None Seen None Seen, Occasional /hpf    Epithelial Cells None Seen None Seen, Occasional /hpf   Sodium, urine, random    Collection Time: 08/31/18 10:24 AM   Result Value Ref Range    Sodium, Ur 12    Creatinine, urine, random    Collection Time: 08/31/18 10:24 AM   Result Value Ref Range    Creatinine, Ur 280 0 mg/dL   Basic metabolic panel    Collection Time: 09/01/18  4:26 AM   Result Value Ref Range    Sodium 134 (L) 136 - 145 mmol/L    Potassium 4 1 3 5 - 5 3 mmol/L    Chloride 105 100 - 108 mmol/L    CO2 20 (L) 21 - 32 mmol/L    ANION GAP 9 4 - 13 mmol/L    BUN 54 (H) 5 - 25 mg/dL    Creatinine 5 45 (H) 0 60 - 1 30 mg/dL    Glucose 89 65 - 140 mg/dL    Calcium 8 2 (L) 8 3 - 10 1 mg/dL    eGFR 11 ml/min/1 73sq m   CBC and Platelet    Collection Time: 09/01/18  4:26 AM   Result Value Ref Range WBC 10 72 (H) 4 31 - 10 16 Thousand/uL    RBC 3 34 (L) 3 88 - 5 62 Million/uL    Hemoglobin 9 1 (L) 12 0 - 17 0 g/dL    Hematocrit 29 8 (L) 36 5 - 49 3 %    MCV 89 82 - 98 fL    MCH 27 2 26 8 - 34 3 pg    MCHC 30 5 (L) 31 4 - 37 4 g/dL    RDW 16 0 (H) 11 6 - 15 1 %    Platelets 628 648 - 500 Thousands/uL    MPV 11 0 8 9 - 12 7 fL       Radiology Results: I have personally reviewed pertinent reports  Other Diagnostic Testing:   I have personally reviewed pertinent reports          Active Meds:   Current Facility-Administered Medications   Medication Dose Route Frequency    albuterol (PROVENTIL HFA,VENTOLIN HFA) inhaler 2 puff  2 puff Inhalation Q4H PRN    albuterol inhalation solution 2 5 mg  2 5 mg Nebulization Q4H PRN    amiodarone tablet 200 mg  200 mg Oral Daily    aspirin chewable tablet 81 mg  81 mg Oral Daily    atorvastatin (LIPITOR) tablet 40 mg  40 mg Oral Daily With Dinner    gabapentin (NEURONTIN) capsule 100 mg  100 mg Oral TID    levalbuterol (XOPENEX) inhalation solution 1 25 mg  1 25 mg Nebulization BID    mesalamine (PENTASA) ER capsule 500 mg  500 mg Oral TID    nicotine (NICODERM CQ) 14 mg/24hr TD 24 hr patch 1 patch  1 patch Transdermal Daily    nystatin (MYCOSTATIN) ointment   Topical BID    ondansetron (ZOFRAN) injection 4 mg  4 mg Intravenous Q4H PRN    oxyCODONE (ROXICODONE) immediate release tablet 10 mg  10 mg Oral Q4H PRN    oxyCODONE (ROXICODONE) IR tablet 5 mg  5 mg Oral Q4H PRN    pantoprazole (PROTONIX) injection 40 mg  40 mg Intravenous Q12H [address]    pneumococcal 23-valent polysaccharide vaccine (PNEUMOVAX-23) injection 0 5 mL  0 5 mL Subcutaneous Prior to discharge    sodium bicarbonate 75 mEq in sodium chloride 0 45 % 1,000 mL infusion  150 mL/hr Intravenous Continuous    sodium chloride 0 9 % inhalation solution 3 mL  3 mL Nebulization BID    tiotropium (SPIRIVA) capsule for inhaler 18 mcg  18 mcg Inhalation Daily         VTE Pharmacologic Prophylaxis: RX contraindicated due to: Concern for upper GI bleeding  VTE Mechanical Prophylaxis: sequential compression device    Christopher Worthington

## 2018-09-02 LAB
ANION GAP SERPL CALCULATED.3IONS-SCNC: 6 MMOL/L (ref 4–13)
BUN SERPL-MCNC: 32 MG/DL (ref 5–25)
CALCIUM SERPL-MCNC: 7.7 MG/DL (ref 8.3–10.1)
CHLORIDE SERPL-SCNC: 108 MMOL/L (ref 100–108)
CO2 SERPL-SCNC: 25 MMOL/L (ref 21–32)
CREAT SERPL-MCNC: 2.26 MG/DL (ref 0.6–1.3)
GFR SERPL CREATININE-BSD FRML MDRD: 31 ML/MIN/1.73SQ M
GLUCOSE SERPL-MCNC: 91 MG/DL (ref 65–140)
POTASSIUM SERPL-SCNC: 4.1 MMOL/L (ref 3.5–5.3)
SODIUM SERPL-SCNC: 139 MMOL/L (ref 136–145)

## 2018-09-02 PROCEDURE — C9113 INJ PANTOPRAZOLE SODIUM, VIA: HCPCS | Performed by: INTERNAL MEDICINE

## 2018-09-02 PROCEDURE — 80048 BASIC METABOLIC PNL TOTAL CA: CPT | Performed by: INTERNAL MEDICINE

## 2018-09-02 PROCEDURE — 99232 SBSQ HOSP IP/OBS MODERATE 35: CPT | Performed by: INTERNAL MEDICINE

## 2018-09-02 PROCEDURE — 94640 AIRWAY INHALATION TREATMENT: CPT

## 2018-09-02 PROCEDURE — 94760 N-INVAS EAR/PLS OXIMETRY 1: CPT

## 2018-09-02 RX ADMIN — NYSTATIN 1 APPLICATION: 100000 OINTMENT TOPICAL at 17:06

## 2018-09-02 RX ADMIN — NICOTINE 1 PATCH: 14 PATCH, EXTENDED RELEASE TRANSDERMAL at 08:21

## 2018-09-02 RX ADMIN — SODIUM BICARBONATE 150 ML/HR: 84 INJECTION, SOLUTION INTRAVENOUS at 22:23

## 2018-09-02 RX ADMIN — AMIODARONE HYDROCHLORIDE 200 MG: 200 TABLET ORAL at 08:21

## 2018-09-02 RX ADMIN — MESALAMINE 500 MG: 250 CAPSULE ORAL at 08:21

## 2018-09-02 RX ADMIN — PANTOPRAZOLE SODIUM 40 MG: 40 INJECTION, POWDER, FOR SOLUTION INTRAVENOUS at 21:13

## 2018-09-02 RX ADMIN — SODIUM BICARBONATE 150 ML/HR: 84 INJECTION, SOLUTION INTRAVENOUS at 15:02

## 2018-09-02 RX ADMIN — GABAPENTIN 100 MG: 100 CAPSULE ORAL at 21:13

## 2018-09-02 RX ADMIN — LEVALBUTEROL HYDROCHLORIDE 1.25 MG: 1.25 SOLUTION, CONCENTRATE RESPIRATORY (INHALATION) at 20:14

## 2018-09-02 RX ADMIN — ASPIRIN 81 MG 81 MG: 81 TABLET ORAL at 08:21

## 2018-09-02 RX ADMIN — NYSTATIN 1 APPLICATION: 100000 OINTMENT TOPICAL at 08:22

## 2018-09-02 RX ADMIN — ATORVASTATIN CALCIUM 40 MG: 40 TABLET, FILM COATED ORAL at 17:08

## 2018-09-02 RX ADMIN — LEVALBUTEROL HYDROCHLORIDE 1.25 MG: 1.25 SOLUTION, CONCENTRATE RESPIRATORY (INHALATION) at 07:41

## 2018-09-02 RX ADMIN — OXYCODONE HYDROCHLORIDE 10 MG: 10 TABLET ORAL at 09:30

## 2018-09-02 RX ADMIN — ISODIUM CHLORIDE 3 ML: 0.03 SOLUTION RESPIRATORY (INHALATION) at 20:15

## 2018-09-02 RX ADMIN — ISODIUM CHLORIDE 3 ML: 0.03 SOLUTION RESPIRATORY (INHALATION) at 07:42

## 2018-09-02 RX ADMIN — TIOTROPIUM BROMIDE 18 MCG: 18 CAPSULE ORAL; RESPIRATORY (INHALATION) at 09:00

## 2018-09-02 RX ADMIN — GABAPENTIN 100 MG: 100 CAPSULE ORAL at 08:21

## 2018-09-02 RX ADMIN — GABAPENTIN 100 MG: 100 CAPSULE ORAL at 17:08

## 2018-09-02 RX ADMIN — OXYCODONE HYDROCHLORIDE 10 MG: 10 TABLET ORAL at 17:07

## 2018-09-02 RX ADMIN — MESALAMINE 500 MG: 250 CAPSULE ORAL at 17:08

## 2018-09-02 RX ADMIN — SODIUM BICARBONATE 150 ML/HR: 84 INJECTION, SOLUTION INTRAVENOUS at 05:16

## 2018-09-02 RX ADMIN — MESALAMINE 500 MG: 250 CAPSULE ORAL at 21:13

## 2018-09-02 RX ADMIN — PANTOPRAZOLE SODIUM 40 MG: 40 INJECTION, POWDER, FOR SOLUTION INTRAVENOUS at 08:22

## 2018-09-02 RX ADMIN — OXYCODONE HYDROCHLORIDE 10 MG: 10 TABLET ORAL at 21:13

## 2018-09-02 NOTE — PROGRESS NOTES
Cardiology Progress Note - Kelsey Whitten 66 y o  male MRN: [id number]    Unit/Bed#: -01 Encounter: [id number]      Assessment/Recommendations:  1  Hypotension: unclear etiology - would also look for sources of infection/sepsis with leukocytosis  Agree with continued IVF hydration  Hold all antihypertensives  2   H/o CAD with prior stent in RCA: stable and asymptomatic with ASA, statin  Troponin negative  3   Ischemic cardiomyopathy: EF 45% with wall motion abnormalities in distribution of prior MI  Volume status appears depleted - watch for volume accumulation as hydrating  4   Chronic systolic CHF: as above  5   DEBBY: continuing with work-up and IVF hydration  6   Leukocytosis: as above, would also search for sources of infection  7   Paroxysmal afib: maintaining SR on amiodarone - continue on this for now  Replete Magnesium to keep >2  Subjective:   Patient seen and examined  No significant events overnight   ; pertinent negatives - chest pain, chest pressure/discomfort, dyspnea, irregular heart beat and palpitations  Objective:     Vitals: Blood pressure 92/50, pulse 94, temperature 97 7 °F (36 5 °C), temperature source Oral, resp  rate 18, height 5' 6" (1 676 m), weight 57 1 kg (125 lb 12 8 oz), SpO2 93 %  , Body mass index is 20 3 kg/m² , Orthostatic Blood Pressures      Most Recent Value   Blood Pressure  92/50 filed at 09/02/2018 3078   Patient Position - Orthostatic VS  Sitting filed at 09/02/2018 7813            Intake/Output Summary (Last 24 hours) at 09/02/18 0818  Last data filed at 09/01/18 2117   Gross per 24 hour   Intake              220 ml   Output             1275 ml   Net            -1055 ml       TELE: No significant arrhythmias seen      Physical Exam:    GEN: Kelsey Whitten appears cachectic, alert and oriented x 3, pleasant and cooperative   HEENT: pupils equal, round, and reactive to light; extraocular muscles intact  NECK: supple, no carotid bruits, no JVD HEART: regular rhythm, normal S1 and S2, no murmurs, clicks, gallops or rubs   LUNGS: clear to auscultation bilaterally; no wheezes, rales, or rhonchi   ABDOMEN: normal bowel sounds, soft, no tenderness, no distention  EXTREMITIES: peripheral pulses normal; no clubbing, cyanosis, or edema  NEURO: no focal findings   SKIN: normal without suspicious lesions on exposed skin    Medications:      Current Facility-Administered Medications:     albuterol (PROVENTIL HFA,VENTOLIN HFA) inhaler 2 puff, 2 puff, Inhalation, Q4H PRN, Navi Sabetha, DO    albuterol inhalation solution 2 5 mg, 2 5 mg, Nebulization, Q4H PRN, Navi Sabetha, DO    amiodarone tablet 200 mg, 200 mg, Oral, Daily, Navi Sabetha, DO, 200 mg at 09/01/18 0915    aspirin chewable tablet 81 mg, 81 mg, Oral, Daily, Luciano Echevarria MD, 81 mg at 09/01/18 0915    atorvastatin (LIPITOR) tablet 40 mg, 40 mg, Oral, Daily With Dinner, Navi Sabetha, DO, 40 mg at 09/01/18 1720    gabapentin (NEURONTIN) capsule 100 mg, 100 mg, Oral, TID, Kelly Palomares MD, 100 mg at 09/01/18 2026    levalbuterol (XOPENEX) inhalation solution 1 25 mg, 1 25 mg, Nebulization, BID, Jae Triplett MD, 1 25 mg at 09/02/18 0741    mesalamine (PENTASA) ER capsule 500 mg, 500 mg, Oral, TID, Navi Sabetha, DO, 500 mg at 09/01/18 2027    nicotine (NICODERM CQ) 14 mg/24hr TD 24 hr patch 1 patch, 1 patch, Transdermal, Daily, Navi Sabetha, DO, 1 patch at 09/01/18 0914    nystatin (MYCOSTATIN) ointment, , Topical, BID, Navi Sabetha, DO    [facility]ron [facility]) injection 4 mg, 4 mg, Intravenous, Q4H PRN, Navi Sabetha, DO, 4 mg at 08/31/18 0219    oxyCODONE (ROXICODONE) immediate release tablet 10 mg, 10 mg, Oral, Q4H PRN, Kelly Palomares MD, 10 mg at 09/01/18 1929    oxyCODONE (ROXICODONE) IR tablet 5 mg, 5 mg, Oral, Q4H PRN, Kelly Palomares MD, 5 mg at 08/31/18 1225    pantoprazole (PROTONIX) injection 40 mg, 40 mg, Intravenous, Q12H [facility], Kelly Palomares MD, 40 mg at 09/01/18 2027    pneumococcal 23-valent polysaccharide vaccine (PNEUMOVAX-23) injection 0 5 mL, 0 5 mL, Subcutaneous, Prior to discharge, Fabby Adhikari MD    sodium bicarbonate 75 mEq in sodium chloride 0 45 % 1,000 mL infusion, 150 mL/hr, Intravenous, Continuous, Adiel Ledesma MD, Last Rate: 150 mL/hr at 09/02/18 0516, 150 mL/hr at 09/02/18 0516    sodium chloride 0 9 % inhalation solution 3 mL, 3 mL, Nebulization, BID, Fabby Adhikari MD, 3 mL at 09/02/18 0742    tiotropium (SPIRIVA) capsule for inhaler 18 mcg, 18 mcg, Inhalation, Daily, Mabeline Gals, DO, 18 mcg at 09/01/18 0940     Labs & Results:          Results from last 7 days  Lab Units 09/01/18  0426 08/31/18  0516 08/30/18  1848   WBC Thousand/uL 10 72* 15 94* 24 08*   HEMOGLOBIN g/dL 9 1* 9 8* 12 7   HEMATOCRIT % 29 8* 31 5* 39 3   PLATELETS Thousands/uL 278 319 512*           Results from last 7 days  Lab Units 09/01/18  0426 08/31/18  0516 08/30/18  1848   SODIUM mmol/L 134* 134* 130*   POTASSIUM mmol/L 4 1 4 5 4 7   CHLORIDE mmol/L 105 106 95*   CO2 mmol/L 20* 16* 20*   BUN mg/dL 54* 51* 51*   CREATININE mg/dL 5 45* 5 24* 5 04*   CALCIUM mg/dL 8 2* 9 0 10 8*   ALK PHOS U/L  --   --  133*   ALT U/L  --   --  21   AST U/L  --   --  10           Results from last 7 days  Lab Units 08/31/18  0516   MAGNESIUM mg/dL 1 1*       Echo: personally reviewed - EF 50%, AK of basal IS and basal inf walls, dilated RV with reduced function, very mild AS    EKG personally reviewed by Shiva Anderson MD

## 2018-09-02 NOTE — PROGRESS NOTES
IM Residency Progress Note   Unit/Bed#: -01 Encounter: [id number]  SOD Team B       Radha Kirkland 66 y o  male [id number]    Hospital Stay Days: 2      Assessment/Plan:    Principal Problem:    Hematemesis with nausea  Active Problems:    Crohn disease ([facility] )    Hypertension    Coronary artery disease involving native coronary artery of native heart without angina pectoris    Leukocytosis    COPD (chronic obstructive pulmonary disease) ([facility])    Cigarette nicotine dependence without complication    Heart failure, systolic, due to idiopathic cardiomyopathy ([place] [address] )    Ileostomy in place ([place] [address] )    Hypotension    DEBBY (acute kidney injury) ([place] [address] )    Abdominal pain    Ambulatory dysfunction    Presence of drug-eluting stent in right coronary artery    Paroxysmal atrial fibrillation (HCC)    Hypercalcemia    DEBBY  -creatinine continuing to trend upward to 5 3  -nephrology following  -morning BMP pending  -UA, renal ultrasound none revealing  -making urine  -urine sodium 14      Hematemesis was nausea  -currently no hematemesis, nausea  -hemoglobin stable, slowly drifting down to 9 1 though this is likely the result of dilution  -PPI  -Zofran p r n  Crohn's disease/ileostomy  -mesalamine 500 mg 3 times a day  -nystatin cream    Hypertension  -current to hold hypertensives in the setting of hypotension    Coronary artery disease status post stenting  -continue statin, aspirin  -cardiology cardiology recs appreciated    Atrial fibrillation  -amiodarone 200 mg daily    Hypercalcemia  -likely contraction    COPD  -albuterol p r n   -leave albuterol inhalation solution  -tiotropium    Heart failure  -not currently in acute exacerbation    Hypotension  -resolving with IV fluids    Leukocytosis  -resolved with IV fluids          Disposition:  Pending diagnosis      Subjective:   No acute events overnight  Patient denies any active bleeding  Patient having urine output through his Hernandez catheter    Denies any nausea/vomiting, though has not eaten yet today  Complains of chronic abdominal pain  Otherwise no other complaints  Vitals: Temp (24hrs), Av °F (36 7 °C), Min:97 7 °F (36 5 °C), Max:98 6 °F (37 °C)  Current: Temperature: 97 7 °F (36 5 °C)  Vitals:    18 2322 18 0742 18 0812   BP:  92/54  92/50   BP Location:  Right arm  Left arm   Pulse:  94     Resp:    18   Temp:  98 6 °F (37 °C)  97 7 °F (36 5 °C)   TempSrc:  Oral  Oral   SpO2: 96% 92% 93%    Weight:       Height:        Body mass index is 20 3 kg/m²  I/O last 24 hours: In: 220 [P O :220]  Out: 3474 [Urine:575; Stool:700]      Physical Exam:  Physical Exam   Constitutional: He is oriented to person, place, and time  He appears well-developed and well-nourished  No distress  HENT:   Head: Normocephalic and atraumatic  Eyes: Conjunctivae are normal  No scleral icterus  Cardiovascular: Normal rate, regular rhythm and normal heart sounds  Exam reveals no gallop and no friction rub  No murmur heard  distant   Pulmonary/Chest: Effort normal  No respiratory distress  He has wheezes  He has no rales  Abdominal: Soft  He exhibits no distension and no mass  There is tenderness (diffuse tenderness)  There is no rebound and no guarding  Colostomy bag in place   Musculoskeletal: He exhibits no edema, tenderness or deformity  Neurological: He is alert and oriented to person, place, and time  Skin: Skin is warm and dry  No rash noted  He is not diaphoretic  No erythema  Psychiatric: He has a normal mood and affect  His behavior is normal  Thought content normal        Invasive Devices     Peripheral Intravenous Line            Peripheral IV 18 Left Arm less than 1 day          Drain            Colostomy   days    Urethral Catheter Latex 16 Fr  1 day                          Labs: No results found for this or any previous visit (from the past 24 hour(s))      Radiology Results: I have personally reviewed pertinent reports  Other Diagnostic Testing:   I have personally reviewed pertinent reports          Active Meds:   Current Facility-Administered Medications   Medication Dose Route Frequency    albuterol (PROVENTIL HFA,VENTOLIN HFA) inhaler 2 puff  2 puff Inhalation Q4H PRN    albuterol inhalation solution 2 5 mg  2 5 mg Nebulization Q4H PRN    amiodarone tablet 200 mg  200 mg Oral Daily    aspirin chewable tablet 81 mg  81 mg Oral Daily    atorvastatin (LIPITOR) tablet 40 mg  40 mg Oral Daily With Dinner    gabapentin (NEURONTIN) capsule 100 mg  100 mg Oral TID    levalbuterol (XOPENEX) inhalation solution 1 25 mg  1 25 mg Nebulization BID    mesalamine (PENTASA) ER capsule 500 mg  500 mg Oral TID    nicotine (NICODERM CQ) 14 mg/24hr TD 24 hr patch 1 patch  1 patch Transdermal Daily    nystatin (MYCOSTATIN) ointment   Topical BID    ondansetron (ZOFRAN) injection 4 mg  4 mg Intravenous Q4H PRN    oxyCODONE (ROXICODONE) immediate release tablet 10 mg  10 mg Oral Q4H PRN    oxyCODONE (ROXICODONE) IR tablet 5 mg  5 mg Oral Q4H PRN    pantoprazole (PROTONIX) injection 40 mg  40 mg Intravenous Q12H [address]    pneumococcal 23-valent polysaccharide vaccine (PNEUMOVAX-23) injection 0 5 mL  0 5 mL Subcutaneous Prior to discharge    sodium bicarbonate 75 mEq in sodium chloride 0 45 % 1,000 mL infusion  150 mL/hr Intravenous Continuous    sodium chloride 0 9 % inhalation solution 3 mL  3 mL Nebulization BID    tiotropium (SPIRIVA) capsule for inhaler 18 mcg  18 mcg Inhalation Daily         VTE Pharmacologic Prophylaxis: RX contraindicated due to: possible hematesis  VTE Mechanical Prophylaxis: sequential compression device    Radha Jane MD

## 2018-09-02 NOTE — PROGRESS NOTES
NEPHROLOGY PROGRESS NOTE    Marilyn Lynn 66 y o  male MRN: [id number]  Unit/Bed#: -01 Encounter: [id number]  Reason for Consult:  Acute renal failure    Patient is awake and alert eating no acute complaints  ASSESSMENT/PLAN:  1  Renal    Patient had acute renal failure with findings suggestive of volume depletion  With IV fluids over the last few days creatinine now significantly improved and has baseline normal creatinine  Urine output is picked up as well  Of note patient had Hernandez catheter placed and the issue is whether not he truly had urine retention or whether not he was just severely volume depleted  At some point the primary team may decide to remove the Hernandez catheter for voiding trial as anticipated renal function is back to normal blood pressure is stable may be ready for discharge in the near future  Consider voiding trial  Renal function improving continue IV fluids    SUBJECTIVE:  Review of Systems   Constitution: Negative  HENT: Negative  Eyes: Negative  Cardiovascular: Negative  Respiratory: Negative  Gastrointestinal: Negative  Genitourinary: Hernandez catheter in       OBJECTIVE:  Current Weight: Weight - Scale: 57 1 kg (125 lb 12 8 oz)  Vitals:Temp (24hrs), Av °F (36 7 °C), Min:97 7 °F (36 5 °C), Max:98 6 °F (37 °C)  Current: Temperature: 97 7 °F (36 5 °C)   Blood pressure 92/50, pulse 94, temperature 97 7 °F (36 5 °C), temperature source Oral, resp  rate 18, height 5' 6" (1 676 m), weight 57 1 kg (125 lb 12 8 oz), SpO2 93 %  , Body mass index is 20 3 kg/m²        Intake/Output Summary (Last 24 hours) at 18 1108  Last data filed at 18 1004   Gross per 24 hour   Intake                0 ml   Output             1850 ml   Net            -1850 ml       Physical Exam: BP 92/50 (BP Location: Left arm)   Pulse 94   Temp 97 7 °F (36 5 °C) (Oral)   Resp 18   Ht 5' 6" (1 676 m)   Wt 57 1 kg (125 lb 12 8 oz)   SpO2 93%   BMI 20 30 kg/m² Physical Exam   Constitutional: He is oriented to person, place, and time  No distress  HENT:   Mouth/Throat: No oropharyngeal exudate  Eyes: No scleral icterus  Neck: Neck supple  No JVD present  Cardiovascular: Normal rate  Exam reveals no friction rub  No edema   Pulmonary/Chest: Effort normal and breath sounds normal  No respiratory distress  He has no wheezes  He has no rales  Abdominal: Soft  Bowel sounds are normal  He exhibits no distension  There is no tenderness  There is no rebound  Neurological: He is alert and oriented to person, place, and time         Medications:    Current Facility-Administered Medications:     albuterol (PROVENTIL HFA,VENTOLIN HFA) inhaler 2 puff, 2 puff, Inhalation, Q4H PRN, [employer], DO    albuterol inhalation solution 2 5 mg, 2 5 mg, Nebulization, Q4H PRN, [employer], DO    amiodarone tablet 200 mg, 200 mg, Oral, Daily, [employer], DO, 200 mg at 09/02/18 0821    aspirin chewable tablet 81 mg, 81 mg, Oral, Daily, Luciano Echevarria MD, 81 mg at 09/02/18 7044    atorvastatin (LIPITOR) tablet 40 mg, 40 mg, Oral, Daily With Dinner, [employer], DO, 40 mg at 09/01/18 1720    gabapentin (NEURONTIN) capsule 100 mg, 100 mg, Oral, TID, Beryle Dolores, MD, 100 mg at 09/02/18 7428    levalbuterol (XOPENEX) inhalation solution 1 25 mg, 1 25 mg, Nebulization, BID, Rachna Montes MD, 1 25 mg at 09/02/18 0741    mesalamine (PENTASA) ER capsule 500 mg, 500 mg, Oral, TID, [employer], DO, 500 mg at 09/02/18 0821    nicotine (NICODERM CQ) 14 mg/24hr TD 24 hr patch 1 patch, 1 patch, Transdermal, Daily, [employer], DO, 1 patch at 09/02/18 0821    nystatin (MYCOSTATIN) ointment, , Topical, BID, [employer], DO, 1 application at 21/22/52 0822    ondansetron (ZOFRAN) injection 4 mg, 4 mg, Intravenous, Q4H PRN, [employer], DO, 4 mg at 08/31/18 0219    oxyCODONE (ROXICODONE) immediate release tablet 10 mg, 10 mg, Oral, Q4H PRN, Beryle Dolores, MD, 10 mg at 09/02/18 0930    oxyCODONE (ROXICODONE) IR tablet 5 mg, 5 mg, Oral, Q4H PRN, Deborah Disla MD, 5 mg at 08/31/18 1225    pantoprazole (PROTONIX) injection 40 mg, 40 mg, Intravenous, Q12H [address], Deborah Disla MD, 40 mg at 09/02/18 3235    pneumococcal 23-valent polysaccharide vaccine (PNEUMOVAX-23) injection 0 5 mL, 0 5 mL, Subcutaneous, Prior to discharge, Cosmo Woodruff MD    sodium bicarbonate 75 mEq in sodium chloride 0 45 % 1,000 mL infusion, 150 mL/hr, Intravenous, Continuous, Justyna Torre MD, Last Rate: 150 mL/hr at 09/02/18 0516, 150 mL/hr at 09/02/18 0516    sodium chloride 0 9 % inhalation solution 3 mL, 3 mL, Nebulization, BID, Cosmo Woodruff MD, 3 mL at 09/02/18 0742    tiotropium (SPIRIVA) capsule for inhaler 18 mcg, 18 mcg, Inhalation, Daily, Sundeep Resendiz DO, 18 mcg at 09/01/18 0940    Laboratory Results:  Lab Results   Component Value Date    WBC 10 72 (H) 09/01/2018    HGB 9 1 (L) 09/01/2018    HCT 29 8 (L) 09/01/2018    MCV 89 09/01/2018     09/01/2018     Lab Results   Component Value Date    GLUCOSE 92 07/08/2018    CALCIUM 7 7 (L) 09/02/2018     09/02/2018    K 4 1 09/02/2018    CO2 25 09/02/2018     09/02/2018    BUN 32 (H) 09/02/2018    CREATININE 2 26 (H) 09/02/2018     Lab Results   Component Value Date    CALCIUM 7 7 (L) 09/02/2018    PHOS 4 6 (H) 08/31/2018     No results found for: LABPROT

## 2018-09-02 NOTE — SOCIAL WORK
CM met with patient at bedside  Pt lives with his daughter Aayush Hall [phone] in a 3 story house with no KAREN  Pt has first floor set up  Pt is independent with ADLs  Pt uses a RW to ambulate and has a bedside commode  Pt has O2 which he uses at night  Supplies through [facility]  No history of STR  Pt is open with [facility] for PT and nursing  Per Pt PT is 2x a week and nurse visit is 1x a week  Per Pt request referral for resumption of care in [place]  Preferred Rx:  [facility]  PCP:  Dr Anupama Harris  No history of [address] treatment  Pt reports that he quit drinking approximately 5 months ago  Pt refused HOST  CM reviewed d/c planning process including the following: identifying help at home, patient preference for d/c planning needs, Discharge Lounge, [facility] Meds to Bed program, availability of treatment team to discuss questions or concerns patient and/or family may have regarding understanding medications and recognizing signs and symptoms once discharged  CM also encouraged patient to follow up with all recommended appointments after discharge  Patient advised of importance for patient and family to participate in managing patients medical well being

## 2018-09-03 VITALS
OXYGEN SATURATION: 92 % | HEIGHT: 66 IN | TEMPERATURE: 98.4 F | RESPIRATION RATE: 18 BRPM | WEIGHT: 125.8 LBS | SYSTOLIC BLOOD PRESSURE: 122 MMHG | DIASTOLIC BLOOD PRESSURE: 81 MMHG | BODY MASS INDEX: 20.22 KG/M2 | HEART RATE: 103 BPM

## 2018-09-03 LAB
ANION GAP SERPL CALCULATED.3IONS-SCNC: 7 MMOL/L (ref 4–13)
ATRIAL RATE: 101 BPM
ATRIAL RATE: 86 BPM
BUN SERPL-MCNC: 18 MG/DL (ref 5–25)
CALCIUM SERPL-MCNC: 7.3 MG/DL (ref 8.3–10.1)
CHLORIDE SERPL-SCNC: 108 MMOL/L (ref 100–108)
CO2 SERPL-SCNC: 29 MMOL/L (ref 21–32)
CREAT SERPL-MCNC: 1.19 MG/DL (ref 0.6–1.3)
ERYTHROCYTE [DISTWIDTH] IN BLOOD BY AUTOMATED COUNT: 15.4 % (ref 11.6–15.1)
GFR SERPL CREATININE-BSD FRML MDRD: 68 ML/MIN/1.73SQ M
GLUCOSE SERPL-MCNC: 94 MG/DL (ref 65–140)
HCT VFR BLD AUTO: 25.2 % (ref 36.5–49.3)
HGB BLD-MCNC: 7.7 G/DL (ref 12–17)
MCH RBC QN AUTO: 26.6 PG (ref 26.8–34.3)
MCHC RBC AUTO-ENTMCNC: 30.6 G/DL (ref 31.4–37.4)
MCV RBC AUTO: 87 FL (ref 82–98)
P AXIS: 187 DEGREES
P AXIS: 2 DEGREES
PLATELET # BLD AUTO: 223 THOUSANDS/UL (ref 149–390)
PMV BLD AUTO: 10.6 FL (ref 8.9–12.7)
POTASSIUM SERPL-SCNC: 3.6 MMOL/L (ref 3.5–5.3)
PR INTERVAL: 136 MS
PR INTERVAL: 136 MS
QRS AXIS: 81 DEGREES
QRS AXIS: 98 DEGREES
QRSD INTERVAL: 106 MS
QRSD INTERVAL: 108 MS
QT INTERVAL: 368 MS
QT INTERVAL: 406 MS
QTC INTERVAL: 477 MS
QTC INTERVAL: 485 MS
RBC # BLD AUTO: 2.9 MILLION/UL (ref 3.88–5.62)
SODIUM SERPL-SCNC: 144 MMOL/L (ref 136–145)
T WAVE AXIS: 113 DEGREES
T WAVE AXIS: 71 DEGREES
VENTRICULAR RATE: 101 BPM
VENTRICULAR RATE: 86 BPM
WBC # BLD AUTO: 9.2 THOUSAND/UL (ref 4.31–10.16)

## 2018-09-03 PROCEDURE — 93010 ELECTROCARDIOGRAM REPORT: CPT | Performed by: INTERNAL MEDICINE

## 2018-09-03 PROCEDURE — 80048 BASIC METABOLIC PNL TOTAL CA: CPT | Performed by: INTERNAL MEDICINE

## 2018-09-03 PROCEDURE — 94640 AIRWAY INHALATION TREATMENT: CPT

## 2018-09-03 PROCEDURE — 94760 N-INVAS EAR/PLS OXIMETRY 1: CPT

## 2018-09-03 PROCEDURE — 93005 ELECTROCARDIOGRAM TRACING: CPT

## 2018-09-03 PROCEDURE — 99232 SBSQ HOSP IP/OBS MODERATE 35: CPT | Performed by: INTERNAL MEDICINE

## 2018-09-03 PROCEDURE — 85027 COMPLETE CBC AUTOMATED: CPT | Performed by: INTERNAL MEDICINE

## 2018-09-03 RX ORDER — PANTOPRAZOLE SODIUM 40 MG/1
40 TABLET, DELAYED RELEASE ORAL
Status: DISCONTINUED | OUTPATIENT
Start: 2018-09-03 | End: 2018-09-03 | Stop reason: HOSPADM

## 2018-09-03 RX ADMIN — LEVALBUTEROL HYDROCHLORIDE 1.25 MG: 1.25 SOLUTION, CONCENTRATE RESPIRATORY (INHALATION) at 07:07

## 2018-09-03 RX ADMIN — ISODIUM CHLORIDE 3 ML: 0.03 SOLUTION RESPIRATORY (INHALATION) at 07:07

## 2018-09-03 RX ADMIN — SODIUM CHLORIDE 1000 ML: 0.9 INJECTION, SOLUTION INTRAVENOUS at 13:59

## 2018-09-03 RX ADMIN — OXYCODONE HYDROCHLORIDE 10 MG: 10 TABLET ORAL at 17:33

## 2018-09-03 RX ADMIN — NICOTINE 1 PATCH: 14 PATCH, EXTENDED RELEASE TRANSDERMAL at 09:21

## 2018-09-03 RX ADMIN — ASPIRIN 81 MG 81 MG: 81 TABLET ORAL at 09:21

## 2018-09-03 RX ADMIN — MESALAMINE 500 MG: 250 CAPSULE ORAL at 17:34

## 2018-09-03 RX ADMIN — ATORVASTATIN CALCIUM 40 MG: 40 TABLET, FILM COATED ORAL at 17:34

## 2018-09-03 RX ADMIN — SODIUM BICARBONATE 150 ML/HR: 84 INJECTION, SOLUTION INTRAVENOUS at 05:46

## 2018-09-03 RX ADMIN — GABAPENTIN 100 MG: 100 CAPSULE ORAL at 09:21

## 2018-09-03 RX ADMIN — OXYCODONE HYDROCHLORIDE 10 MG: 10 TABLET ORAL at 05:48

## 2018-09-03 RX ADMIN — MESALAMINE 500 MG: 250 CAPSULE ORAL at 09:21

## 2018-09-03 RX ADMIN — NYSTATIN 1 APPLICATION: 100000 OINTMENT TOPICAL at 17:32

## 2018-09-03 RX ADMIN — PANTOPRAZOLE SODIUM 40 MG: 40 TABLET, DELAYED RELEASE ORAL at 11:46

## 2018-09-03 RX ADMIN — AMIODARONE HYDROCHLORIDE 200 MG: 200 TABLET ORAL at 09:21

## 2018-09-03 RX ADMIN — PANTOPRAZOLE SODIUM 40 MG: 40 TABLET, DELAYED RELEASE ORAL at 17:34

## 2018-09-03 RX ADMIN — GABAPENTIN 100 MG: 100 CAPSULE ORAL at 17:33

## 2018-09-03 RX ADMIN — NYSTATIN 1 APPLICATION: 100000 OINTMENT TOPICAL at 09:21

## 2018-09-03 RX ADMIN — TIOTROPIUM BROMIDE 18 MCG: 18 CAPSULE ORAL; RESPIRATORY (INHALATION) at 09:22

## 2018-09-03 RX ADMIN — OXYCODONE HYDROCHLORIDE 10 MG: 10 TABLET ORAL at 09:53

## 2018-09-03 NOTE — PROGRESS NOTES
The pantoprazole has / have been converted to Oral per [facility] for Adults as approved by the Pharmacy and Therapeutics Committee  The patient met all eligible criteria:  3 Age = 27years old   2) Received at least one dose of the IV form   3) Receiving at least one other scheduled oral/enteral medication   4) Tolerating an oral/enteral diet   and did not have any exclusions:   1) Critical care patient   2) Active GI bleed (IF assessing H2RAs or PPIs)   3) Continuous tube feeding (IF assessing cipro, doxycycline, levofloxacin, minocycline, rifampin, or voriconazole)   4) Receiving PO vancomycin (IF assessing metronidazole)   5) Persistent nausea and/or vomiting   6) Ileus or gastrointestinal obstruction   7) Mazin/nasogastric tube set for continuous suction   8) Specific order not to automatically convert to PO (in the order's comments or if discussed in the most recent Infectious Disease or primary team's progress notes)

## 2018-09-03 NOTE — DISCHARGE SUMMARY
[facility] CENTRAL Discharge Summary - Medical Rena Diaz 66 y o  male MRN: [id number]    [place] [address]      Room / Bed: /-01 Encounter: [id number]    BRIEF OVERVIEW    Admitting Provider: Mike Gonzales MD  Discharge Provider: Mike Gonzales MD  Primary Care Physician at Discharge: [facility]  Admission Date: 8/30/2018     Discharge Date: 9/3/2018  Hospital Course  Patient was admitted due to potential hematemesis, nausea, vomiting and an urea  Patient's hemoglobin was stable throughout his stay  He was found to have an elevated creatinine as a result of dehydration  His baseline creatinine is approximately 1, on admission his creatinine was approximately 5 25, it chris to 5 5  With aggressive IV fluid hydration his creatinine trended down words to 2 24, eventually coming back down to its baseline on 09/03/2018  At this point the Hernandez catheter that was inserted at admission was removed  Following successful voiding without Hernandez, the patient was deemed stable for discharge  He was instructed to follow up in the outpatient clinic in approximately 1 week with lab work  He was also instructed to follow up with his colorectal surgeon regarding possible ostomy reversal     He has high output through his ostomy, which is believed to be contributing greatly to his recurrent DEBBY  The patient was recommended to drink more water  The patient was warned that his frequent dehydration could result in kidney damage  The patient however stated that if he drank more water he would become nauseous  It was explained several times the patient that if he is unable to drink because he is nauseous, or if he is not making urine that he should seek immediate medical attention      Presenting Problem/History of Present Illness  Principal Problem:    Hematemesis with nausea  Active Problems:    Crohn disease ([address] )    Hypertension Coronary artery disease involving native coronary artery of native heart without angina pectoris    COPD (chronic obstructive pulmonary disease) (HCC)    Cigarette nicotine dependence without complication    Heart failure, systolic, due to idiopathic cardiomyopathy ([facility] )    Ileostomy in place ([facility] )    Hypotension    DEBBY (acute kidney injury) ([facility] )    Abdominal pain    Ambulatory dysfunction    Presence of drug-eluting stent in right coronary artery    Paroxysmal atrial fibrillation ([facility])    Hypercalcemia  Resolved Problems:    Leukocytosis    Hematemesis nausea  -no current hematemesis, CBC was stable  -no active signs of bleeding at discharge  -outpatient follow-up       Crohn's disease/ileostomy  -mesalamine 500 mg 3 times a day  -nystatin cream  -outpatient follow-up    Hypertension  -continue to hold hypertensives in the setting of hypotension  -outpatient follow-up     Coronary artery disease status post stenting  -continue statin, aspirin  -cardiology recs appreciated  -outpatient follow-up     Atrial fibrillation  -continue amiodarone  -outpatient follow-up     Hypercalcemia  -likely was was result of contraction  -not hypocalcemic  -patient asymptomatic  -outpatient follow-up     COPD  -albuterol p r n   -levalbuterol inhalation solution  -tiotropium  -outpatient follow-up     Leukocytosis  -resolved  -outpatient follow-up    Nicotine dependent  -outpatient follow-up    Diagnostic Procedures Performed  Imaging Studies:    Pertinent Labs:       Therapeutic Procedures Performed  None    Test Results Pending at Discharge: [facility]      Medications     Medication List to be Continued at Discharge  Current Discharge Medication List      CONTINUE these medications which have NOT CHANGED    Details   albuterol (2 5 mg/3 mL) 0 083 % nebulizer solution Take 3 mL (2 5 mg total) by nebulization every 4 (four) hours as needed for wheezing as needed for wheezing  Qty: 150 mL, Refills: 0    Associated Diagnoses: COPD (chronic obstructive pulmonary disease) ([facility])      amiodarone 200 mg tablet Take 1 tablet (200 mg total) by mouth daily  Qty: 30 tablet, Refills: 3    Associated Diagnoses: Atrial fibrillation with RVR ([facility])      aspirin 81 mg chewable tablet Chew 1 tablet (81 mg total) daily  Qty: 30 tablet, Refills: 0    Associated Diagnoses: Coronary artery disease involving native coronary artery of native heart without angina pectoris      folic acid (FOLVITE) 1 mg tablet Take 1 tablet (1 mg total) by mouth daily  Qty: 30 tablet, Refills: 0    Associated Diagnoses: Alcohol dependence with withdrawal with complication ([facility])      gabapentin (NEURONTIN) 400 mg capsule Take 1 capsule (400 mg total) by mouth 3 (three) times a day  Qty: 90 capsule, Refills: 0    Associated Diagnoses: Cervical radiculopathy      lisinopril (ZESTRIL) 2 5 mg tablet Take 1 tablet (2 5 mg total) by mouth daily  Qty: 30 tablet, Refills: 0    Associated Diagnoses: Essential hypertension      mesalamine (PENTASA) 500 mg CR capsule Take 1 capsule (500 mg total) by mouth 3 (three) times a day  Refills: 0      Multiple Vitamins-Minerals (CENTRUM SILVER 50+MEN PO) Centrum Silver Men      nystatin (MYCOSTATIN) ointment Apply topically 2 (two) times a day  Qty: 30 g, Refills: 0    Associated Diagnoses: Groin rash      thiamine 100 MG tablet Take 1 tablet (100 mg total) by mouth daily  Qty: 30 tablet, Refills: 3    Associated Diagnoses: Alcohol abuse      Tiotropium Bromide Monohydrate (SPIRIVA RESPIMAT) 2 5 MCG/ACT AERS Inhale 1 Act (2 5 mcg total) daily  Qty: 1 Inhaler, Refills: 0    Associated Diagnoses: COPD with exacerbation ([facility])      traMADol (ULTRAM) 50 mg tablet Take 100 mg by mouth every 12 (twelve) hours  Refills: 0      VENTOLIN  (90 Base) MCG/ACT inhaler Inhale 2 puffs every 4 (four) hours as needed for wheezing or shortness of breath  Qty: 1 Inhaler, Refills: 0    Associated Diagnoses: COPD (chronic obstructive pulmonary disease) ([facility])      vitamin A 10,000 units capsule Take 3 capsules (30,000 Units total) by mouth daily  Qty: 10 capsule, Refills: 0    Associated Diagnoses: Colitis      atorvastatin (LIPITOR) 40 mg tablet Take 1 tablet (40 mg total) by mouth daily with dinner for 30 days  Qty: 30 tablet, Refills: 0    Associated Diagnoses: Coronary artery disease involving native coronary artery of native heart without angina pectoris      Nebulizers (AERONEB GO NEBULIZER HANDSET) MISC by Does not apply route 2 (two) times a day as needed (wheezing)  Qty: 1 each, Refills: 0    Associated Diagnoses: Uncomplicated asthma, unspecified asthma severity, unspecified whether persistent           Current Discharge Medication List        Current Discharge Medication List          Allergies  Allergies   Allergen Reactions    Medical Tape     Other      Brown cloth band aids       Latex Rash     Discharge Diet: regular diet and cardiac diet  Activity restrictions: as tolerated  Discharge Condition: stable  Discharged With Lines: no    Discharge Disposition: Home with [address]  yes      Follow up: PCP  Date and time: One Week  Location: [facility]  Have BMP done prior to arrival      Follow up: Dr Johana Ramon  Date and time: Make appointment  Follow up within next: Week  Regarding possible ostomy reversal       Code Status: Level 1 - Full Code  Advance Directive and Living Will: <no information>  Power of :    POLST:      Discharge  Statement   I spent 30 minutes minutes discharging the patient  This time was spent on the day of discharge  I had direct contact with the patient on the day of discharge  Additional documentation is required if more than 30 minutes were spent on discharge

## 2018-09-03 NOTE — PROGRESS NOTES
Cardiology Progress Note - Neva Sam 66 y o  male MRN: [id number]    Unit/Bed#: -01 Encounter: [id number]      Assessment/Recommendations:  1  Hypotension: unclear etiology - possibly with severe volume depletion  Agree with continued IVF hydration  Hold all antihypertensives  2   H/o CAD with prior stent in RCA: stable and asymptomatic with ASA, statin  Troponin negative  3   Ischemic cardiomyopathy: EF 45% with wall motion abnormalities in distribution of prior MI  Volume status appears depleted - watch for volume accumulation as hydrating  4   Chronic systolic CHF: as above  5   DEBBY: continuing with work-up and IVF hydration  6   Leukocytosis: as above, would also search for sources of infection  7   Paroxysmal afib: maintaining SR on amiodarone - continue on this for now  Replete Magnesium to keep >2  Heart rates elevated, sounds irregular, would check an EKG today  Subjective:   Patient seen and examined  No significant events overnight   ; pertinent negatives - chest pain, chest pressure/discomfort, dyspnea, irregular heart beat and palpitations  Objective:     Vitals: Blood pressure 92/55, pulse (!) 111, temperature 98 °F (36 7 °C), temperature source Temporal, resp  rate 18, height 5' 6" (1 676 m), weight 57 1 kg (125 lb 12 8 oz), SpO2 95 %  , Body mass index is 20 3 kg/m² ,   Orthostatic Blood Pressures      Most Recent Value   Blood Pressure  92/55 filed at 09/03/2018 0708   Patient Position - Orthostatic VS  Sitting filed at 09/03/2018 0708            Intake/Output Summary (Last 24 hours) at 09/03/18 0834  Last data filed at 09/03/18 0535   Gross per 24 hour   Intake              180 ml   Output             3950 ml   Net            -3770 ml         Physical Exam:    GEN: Neva Sam appears cachectic, alert and oriented x 3, pleasant and cooperative   HEENT: pupils equal, round, and reactive to light; extraocular muscles intact  NECK: supple, no carotid bruits HEART: irregular rhythm, normal S1 and S2, no murmurs, clicks, gallops or rubs   LUNGS: clear to auscultation bilaterally; no wheezes, rales, or rhonchi   ABDOMEN: normal bowel sounds, soft, no tenderness, no distention  EXTREMITIES: peripheral pulses normal; no clubbing, cyanosis, or edema  NEURO: no focal findings   SKIN: normal without suspicious lesions on exposed skin      Medications:      Current Facility-Administered Medications:     albuterol (PROVENTIL HFA,VENTOLIN HFA) inhaler 2 puff, 2 puff, Inhalation, Q4H PRN, Chuck Oyster, DO    albuterol inhalation solution 2 5 mg, 2 5 mg, Nebulization, Q4H PRN, Chuck Oyster, DO    amiodarone tablet 200 mg, 200 mg, Oral, Daily, Chuck Oyster, DO, 200 mg at 09/02/18 0821    aspirin chewable tablet 81 mg, 81 mg, Oral, Daily, Luciano Echevarria MD, 81 mg at 09/02/18 0952    atorvastatin (LIPITOR) tablet 40 mg, 40 mg, Oral, Daily With Dinner, Chuck Oyster, DO, 40 mg at 09/02/18 1708    gabapentin (NEURONTIN) capsule 100 mg, 100 mg, Oral, TID, Meagan García MD, 100 mg at 09/02/18 2113    levalbuterol (XOPENEX) inhalation solution 1 25 mg, 1 25 mg, Nebulization, BID, Francisco Newsome MD, 1 25 mg at 09/03/18 0707    mesalamine (PENTASA) ER capsule 500 mg, 500 mg, Oral, TID, Chuck Oyster, DO, 500 mg at 09/02/18 2113    nicotine (NICODERM CQ) 14 mg/24hr TD 24 hr patch 1 patch, 1 patch, Transdermal, Daily, Chuck Oyster, DO, 1 patch at 09/02/18 0821    nystatin (MYCOSTATIN) ointment, , Topical, BID, Chuck Oyster, DO, 1 application at [birth year] 1706    ondansetron (ZOFRAN) injection 4 mg, 4 mg, Intravenous, Q4H PRN, Chuck Oyster, DO, 4 mg at 08/31/18 0219    oxyCODONE (ROXICODONE) immediate release tablet 10 mg, 10 mg, Oral, Q4H PRN, Meagan García MD, 10 mg at 09/03/18 0548    oxyCODONE (ROXICODONE) IR tablet 5 mg, 5 mg, Oral, Q4H PRN, Meagan García MD, 5 mg at 08/31/18 1225    pantoprazole (PROTONIX) injection 40 mg, 40 mg, Intravenous, Q12H [facility], Beryle Dolores, MD, 40 mg at 09/02/18 2113    pneumococcal 23-valent polysaccharide vaccine (PNEUMOVAX-23) injection 0 5 mL, 0 5 mL, Subcutaneous, Prior to discharge, Rachna Montes MD    sodium bicarbonate 75 mEq in sodium chloride 0 45 % 1,000 mL infusion, 150 mL/hr, Intravenous, Continuous, Davis Castellanos MD, Last Rate: 150 mL/hr at 09/03/18 0546, 150 mL/hr at 09/03/18 0546    sodium chloride 0 9 % inhalation solution 3 mL, 3 mL, Nebulization, BID, Rachna Montes MD, 3 mL at 09/03/18 0707    tiotropium (SPIRIVA) capsule for inhaler 18 mcg, 18 mcg, Inhalation, Daily, Keatign Mode, DO, 18 mcg at 09/02/18 0900     Labs & Results:          Results from last 7 days  Lab Units 09/03/18  0531 09/01/18  0426 08/31/18  0516   WBC Thousand/uL 9 20 10 72* 15 94*   HEMOGLOBIN g/dL 7 7* 9 1* 9 8*   HEMATOCRIT % 25 2* 29 8* 31 5*   PLATELETS Thousands/uL 223 278 319           Results from last 7 days  Lab Units 09/03/18  0531 09/02/18  0928 09/01/18  0426 08/30/18  1848   SODIUM mmol/L 144 139 134*  < > 130*   POTASSIUM mmol/L 3 6 4 1 4 1  < > 4 7   CHLORIDE mmol/L 108 108 105  < > 95*   CO2 mmol/L 29 25 20*  < > 20*   BUN mg/dL 18 32* 54*  < > 51*   CREATININE mg/dL 1 19 2 26* 5 45*  < > 5 04*   CALCIUM mg/dL 7 3* 7 7* 8 2*  < > 10 8*   ALK PHOS U/L  --   --   --   --  133*   ALT U/L  --   --   --   --  21   AST U/L  --   --   --   --  10   < > = values in this interval not displayed          Results from last 7 days  Lab Units 08/31/18  0516   MAGNESIUM mg/dL 1 1*       Echo: personally reviewed - EF 50%, AK of basal IS and basal inf walls, dilated RV with reduced function, very mild AS    EKG personally reviewed by Kim Tian MD

## 2018-09-03 NOTE — DISCHARGE INSTRUCTIONS
Acute Kidney Injury   AMBULATORY CARE:   Acute kidney injury (DEBBY) is also called acute kidney failure, or acute renal failure  DEBBY happens when your kidneys suddenly stop working correctly  Normally, the kidneys remove fluid, chemicals, and waste from your blood  These wastes are turned into urine by your kidneys  DEBBY usually happens over hours or days  When you have DEBBY, your kidneys do not remove the waste, chemicals, or extra fluid from your body  A normal amount of urine is not produced  DEBBY is usually temporary, but it may become a chronic kidney condition  Causes of DEBBY:   · Decreased blood flow to the kidney, such as from hypercalcemia (high blood calcium level) or severe heart disease     · A disease or condition that affects the kidneys, such as hypertension (high blood pressure) or diabetes     · A blockage in the kidney or ureter, such as a kidney or bladder stone, enlarged prostate, or tumor  Common symptoms include the following: You may not have any symptoms with early or mild DEBBY  As DEBBY progresses, you may have any of the following:  · Decrease in the amount of urine or no urination    · Swelling in your arms, legs, or feet     · Weakness, drowsiness, or no appetite    · Nausea, flank pain, muscle twitching or muscle cramps    · Itchy skin, or your, breath or body smells like urine    · Behavior changes, confusion, disorientation, or seizures  Call 911 if:   · You have sudden chest pain or trouble breathing  Seek care immediately if:   · Your symptoms get worse  Contact your healthcare provider if:   · Your symptoms return  · Your blood sugar or blood pressure level is not within the range your healthcare provider recommends  · You have questions or concerns about your condition or care  Treatment for DEBBY  depends upon the cause of your acute kidney injury and how severe it is   Usually, DEBBY will be monitored in the hospital  If you have mild DEBBY, you may be able to go home to recover  Your healthcare providers will treat the cause of your DEBBY  You may need IV fluids if your DEBBY was caused by little or no fluid in your body  You may need dialysis to remove waste and extra fluid from your body  Nutrition:  Your healthcare provider may tell you to eat food low in sodium (salt), potassium, phosphorus, or protein  A dietitian can help you plan your meals  Drink liquids as directed: Your healthcare provider may recommend that you drink a certain amount of liquids  This will help your kidneys work better and decrease your risk for dehydration  Ask how much liquid to drink each day and which liquids are best for you  What you can do to manage and prevent DEBBY:   · Monitor and manage other health conditions  such as diabetes, high blood pressure, or heart disease  These conditions increase your risk for acute kidney injury  Take your medicines for these conditions as directed  Also, monitor your blood sugar and blood pressure levels as directed  Contact your healthcare provider if your levels are not in the range he or she says it should be  · Talk to your healthcare provider before you take over-the-counter-medicine  NSAIDs, stomach medicine, or laxatives may harm your kidneys and increase your risk for acute kidney injury  If it is okay to take the medicine, follow the directions on the package  Do not take more than directed  · Tell healthcare providers you have had acute kidney injury  before you get contrast liquid for an x-ray or CT scan  Your healthcare provider may give you medicine to prevent kidney problems caused by the liquid  Follow up with your healthcare provider as directed:  Write down your questions so you remember to ask them during your visits  © 2017 [employer] [employer]  Information is for End User's use only and may not be sold, redistributed or otherwise used for commercial purposes   All illustrations and images included in CareNotes® are the copyrighted property of [employer]  or Amrit Vallejo  The above information is an  only  It is not intended as medical advice for individual conditions or treatments  Talk to your doctor, nurse or pharmacist before following any medical regimen to see if it is safe and effective for you

## 2018-09-03 NOTE — PROGRESS NOTES
IM Residency Progress Note   Unit/Bed#: -01 Encounter: [id number]  SOD Team B       Hyun Coronado 66 y o  male [id number]    Hospital Stay Days: 3      Assessment/Plan:    Principal Problem:    Hematemesis with nausea  Active Problems:    Crohn disease ([facility] )    Hypertension    Coronary artery disease involving native coronary artery of native heart without angina pectoris    COPD (chronic obstructive pulmonary disease) ([facility])    Cigarette nicotine dependence without complication    Heart failure, systolic, due to idiopathic cardiomyopathy ([facility] )    Ileostomy in place ([place] [address] )    Hypotension    DEBBY (acute kidney injury) ([place] [address] )    Abdominal pain    Ambulatory dysfunction    Presence of drug-eluting stent in right coronary artery    Paroxysmal atrial fibrillation (HCC)    Hypercalcemia    Hematemesis with nausea  -no current evidence of hematemesis  -CBCs been stable    DEBBY  -creatinine currently at 1 19, near his baseline  -urine sodium of 14, suggesting prerenal etiology  -nephrology following  -Hernandez was DC this morning, pending voiding trial patient may be suitable for discharge  -given patient's persistent low BP nephrology opinion sought on appropriateness of fludrocortisone on discharge for increased water and sodium reabsorption given patient's recurrent admissions for DEBBY    Crohn's disease/ileostomy  -mesalamine 500 mg 3 times a day  -nystatin cream    Hypertension  -continue to hold hypertensives in the setting of hypotension    Coronary artery disease status post stenting  -continue statin, aspirin  -cardiology recs appreciated    Atrial fibrillation  -continue amiodarone    Hypercalcemia  -likely was was result of contraction  -not hypocalcemic  -patient asymptomatic    COPD  -albuterol p r n   -levalbuterol inhalation solution  -tiotropium    Leukocytosis  -resolved        Disposition:  Possible discharge today, pending nephro recs      Subjective:   No acute events overnight    Patient reports that he feels well  He is tolerating his diet with out nausea or vomiting  Patient states he like to go home  Denies shortness of breath, fever, chills  No active bleeding  Vitals: Temp (24hrs), Av 1 °F (36 7 °C), Min:98 °F (36 7 °C), Max:98 1 °F (36 7 °C)  Current: Temperature: 98 °F (36 7 °C)  Vitals:    18 1510 18 2330 18 0708   BP: 104/64  107/62 92/55   BP Location: Left arm  Left arm Left arm   Pulse: 87  92 (!) 111   Resp:  18   Temp: 98 1 °F (36 7 °C)  98 °F (36 7 °C)    TempSrc: Oral  Temporal    SpO2: 93% 93% 96% 95%   Weight:       Height:        Body mass index is 20 3 kg/m²  I/O last 24 hours: In: 180 [P O :180]  Out: 3950 [Urine:2775; Stool:1175]      Physical Exam:   Physical Exam   Constitutional: He is oriented to person, place, and time  He appears well-developed and well-nourished  No distress  HENT:   Head: Normocephalic and atraumatic  Eyes: Conjunctivae are normal  No scleral icterus  Cardiovascular: Normal rate and regular rhythm  Exam reveals no gallop and no friction rub  No murmur heard  Distant heart sounds   Pulmonary/Chest: Effort normal  No respiratory distress  He has wheezes  He has no rales  Abdominal: Soft  He exhibits no distension and no mass  There is tenderness (Mild)  There is guarding (Mild)  There is no rebound  Ileostomy in place, surgical scar along the umbilicus   Musculoskeletal: He exhibits no edema, tenderness or deformity  Neurological: He is alert and oriented to person, place, and time  Skin: Skin is warm and dry  No rash noted  He is not diaphoretic  No erythema  Psychiatric: He has a normal mood and affect   His behavior is normal  Thought content normal        Invasive Devices     Peripheral Intravenous Line            Peripheral IV 18 Right Arm less than 1 day          Drain            Colostomy   days    Urethral Catheter Latex 16 Fr  2 days                          Labs:   Recent Results (from the past 24 hour(s))   Basic metabolic panel    Collection Time: 09/02/18  9:28 AM   Result Value Ref Range    Sodium 139 136 - 145 mmol/L    Potassium 4 1 3 5 - 5 3 mmol/L    Chloride 108 100 - 108 mmol/L    CO2 25 21 - 32 mmol/L    ANION GAP 6 4 - 13 mmol/L    BUN 32 (H) 5 - 25 mg/dL    Creatinine 2 26 (H) 0 60 - 1 30 mg/dL    Glucose 91 65 - 140 mg/dL    Calcium 7 7 (L) 8 3 - 10 1 mg/dL    eGFR 31 ml/min/1 73sq m   CBC and Platelet    Collection Time: 09/03/18  5:31 AM   Result Value Ref Range    WBC 9 20 4  31 - 10 16 Thousand/uL    RBC 2 90 (L) 3 88 - 5 62 Million/uL    Hemoglobin 7 7 (L) 12 0 - 17 0 g/dL    Hematocrit 25 2 (L) 36 5 - 49 3 %    MCV 87 82 - 98 fL    MCH 26 6 (L) 26 8 - 34 3 pg    MCHC 30 6 (L) 31 4 - 37 4 g/dL    RDW 15 4 (H) 11 6 - 15 1 %    Platelets 316 027 - 407 Thousands/uL    MPV 10 6 8 9 - 12 7 fL   Basic metabolic panel    Collection Time: 09/03/18  5:31 AM   Result Value Ref Range    Sodium 144 136 - 145 mmol/L    Potassium 3 6 3 5 - 5 3 mmol/L    Chloride 108 100 - 108 mmol/L    CO2 29 21 - 32 mmol/L    ANION GAP 7 4 - 13 mmol/L    BUN 18 5 - 25 mg/dL    Creatinine 1 19 0 60 - 1 30 mg/dL    Glucose 94 65 - 140 mg/dL    Calcium 7 3 (L) 8 3 - 10 1 mg/dL    eGFR 68 ml/min/1 73sq m       Radiology Results: I have personally reviewed pertinent reports  Other Diagnostic Testing:   I have personally reviewed pertinent reports          Active Meds:   Current Facility-Administered Medications   Medication Dose Route Frequency    albuterol (PROVENTIL HFA,VENTOLIN HFA) inhaler 2 puff  2 puff Inhalation Q4H PRN    albuterol inhalation solution 2 5 mg  2 5 mg Nebulization Q4H PRN    amiodarone tablet 200 mg  200 mg Oral Daily    aspirin chewable tablet 81 mg  81 mg Oral Daily    atorvastatin (LIPITOR) tablet 40 mg  40 mg Oral Daily With Dinner    gabapentin (NEURONTIN) capsule 100 mg  100 mg Oral TID    levalbuterol (XOPENEX) inhalation solution 1 25 mg  1 25 mg Nebulization BID    mesalamine (PENTASA) ER capsule 500 mg  500 mg Oral TID    nicotine (NICODERM CQ) 14 mg/24hr TD 24 hr patch 1 patch  1 patch Transdermal Daily    nystatin (MYCOSTATIN) ointment   Topical BID    ondansetron (ZOFRAN) injection 4 mg  4 mg Intravenous Q4H PRN    oxyCODONE (ROXICODONE) immediate release tablet 10 mg  10 mg Oral Q4H PRN    oxyCODONE (ROXICODONE) IR tablet 5 mg  5 mg Oral Q4H PRN    pantoprazole (PROTONIX) EC tablet 40 mg  40 mg Oral BID AC    pneumococcal 23-valent polysaccharide vaccine (PNEUMOVAX-23) injection 0 5 mL  0 5 mL Subcutaneous Prior to discharge    sodium bicarbonate 75 mEq in sodium chloride 0 45 % 1,000 mL infusion  150 mL/hr Intravenous Continuous    sodium chloride 0 9 % inhalation solution 3 mL  3 mL Nebulization BID    tiotropium (SPIRIVA) capsule for inhaler 18 mcg  18 mcg Inhalation Daily         VTE Pharmacologic Prophylaxis: RX contraindicated due to: Possible hematemesis  VTE Mechanical Prophylaxis: sequential compression device    Dee Bundy MD

## 2018-09-04 ENCOUNTER — PATIENT OUTREACH (OUTPATIENT)
Dept: INTERNAL MEDICINE CLINIC | Facility: CLINIC | Age: 58
End: 2018-09-04

## 2018-09-04 NOTE — Clinical Note
RN Outpatient Care Management Update  Dr Park Quiet you will be seeing pt on 9/10 Dr Kevin Zepeda since you are pt's PCP  Any questions please let me know  Thanks  Bobby Romeo

## 2018-09-04 NOTE — PROGRESS NOTES
Pt was recently at [facility] from 8/30 - 9/3/18 for hematemesis with nausea and DEBBY  Pt d/c home and resumption of [facility] for nursing and PT  I called and spoke with pt  Pt reports he is feeling well and managing well at home at this time  Pt's only complaint is some tenderness on his abdomen  Pt denies any N/V, CP, SOB, fevers, or chills  Pt reports has been mostly independent with ADL's  Pt reports needs assistance with getting dressed at times and is currently using commode and urinal and needs help emptying it as bathroom is on 2nd floor of the home  PT is aware and is working with pt on doing the steps in the home  Pt reports he went about 4-5 steps and then turned around and went down them  Pt is now using a cane instead of the walker  Pt is still not driving at this time  Pt reports is trying to stay hydrated and reports already drank about 3 bottle of water this morning  Pt is aware of apt with PCP office on 9/10 with Dr Cesar Berger  This apt is for clearance for ileostomy reversal  Per d/c summary pt is having high output through his ostomy which is believed to be contributing to his recurrent DEBBY  Pt is aware needs to have BMP done around 9/10  Pt reports he will let the [facility] nurse know this  I will setup Lyft for pt to get to PCP office on 9/10  I did offer pt a TCM appt (unable to accommodate pt on same day of appt with Dr Cesar Berger on 9/10  Pt has Nila Colon next week and did not want to come back another day) I explained importance and he reports "I will see how appt goes on 9/10 and go from there "     Pt reports he has all his medication and taking as prescribed  Pt reports his daughter is still helping him with setting up his medication  Pt is aware his metoprolol was d/c  Pt reports is taking his amiodarone 200 mg daily and lisinopril 2 5 mg daily and the other medication on his hospital d/c med list  Pt reports he does not need any refills at this time       Pt does not have any further questions or concerns  He has my contact # and is agreeable for further outreach

## 2018-09-05 ENCOUNTER — TRANSITIONAL CARE MANAGEMENT (OUTPATIENT)
Dept: INTERNAL MEDICINE CLINIC | Facility: CLINIC | Age: 58
End: 2018-09-05

## 2018-09-05 LAB
ATRIAL RATE: 103 BPM
PR INTERVAL: 138 MS
QRS AXIS: 100 DEGREES
QRSD INTERVAL: 104 MS
QT INTERVAL: 370 MS
QTC INTERVAL: 484 MS
T WAVE AXIS: 113 DEGREES
VENTRICULAR RATE: 103 BPM

## 2018-09-05 PROCEDURE — 93010 ELECTROCARDIOGRAM REPORT: CPT | Performed by: INTERNAL MEDICINE

## 2018-09-06 ENCOUNTER — PATIENT OUTREACH (OUTPATIENT)
Dept: INTERNAL MEDICINE CLINIC | Facility: CLINIC | Age: 58
End: 2018-09-06

## 2018-09-06 ENCOUNTER — TELEPHONE (OUTPATIENT)
Dept: INTERNAL MEDICINE CLINIC | Facility: CLINIC | Age: 58
End: 2018-09-06

## 2018-09-06 NOTE — TELEPHONE ENCOUNTER
ZAIDI - PT  SCHEDULED WITH YOU ON 9/10/18      VD A CALL FROM [facility], HE WAS OUT TO SEE THE PT  TO DO A START OF CARE ON 9/5/18 AND SAID SOME OF THE MEDS ON HIS DISCHARGE MED LIST DO NOT MATCH THE MEDS HE HAS A HOME  DISCHARGE SAYS:  GABAPENTIN 400 MG, ONE TAB 3X DAILY  PENTASA 500 MG, ONE TAB 3X DAILY      BOTTLE AT HOME SAYS:  GABAPENTIN 300, ONE TAB 3X DAILY  PENTASA 500 MG, 2 TABS 4X DAILY      NEW SCRIPTS WERE NEVER SENT TO THE PHARMACY SO HE IS NOT SURE IF PT  TO BE ON DIFFERENT DOSES  PT  IS COMING IN WITH DR LYONS ON MON  9/10/18 AND WAS INSTRUCTED TO BRING ALL OF HIS MEDICATION BOTTLES WITH HIM TO REVIEW  CLAUDIA ADVISED PT  PER ME TO CONTINUE WHAT HE IS CURRENTLY DOING UNTIL THAT APPT

## 2018-09-06 NOTE — PROGRESS NOTES
Spoke with MARTINEZ [facility] at [facility] transportation is setup for Monday 9/10    time will be 1:45 pm (pt needs to be at office for 2:15 pm for a 2:30 pm apt) and return  time is 4pm

## 2018-09-06 NOTE — PROGRESS NOTES
Made pt aware Julio levi was arranged to and from office on Monday 9/10  Pt is aware  time from home will be 1:45 pm and return  time is 4pm  Pt has main office # to call if he needs assistance getting up to the office or issues with transport  Pt is aware I will not be in the office on Monday  I asked for pt to bring along medication bottles with him as 2 meds need further clarification

## 2018-09-07 RX ORDER — METOPROLOL SUCCINATE 50 MG/1
TABLET, EXTENDED RELEASE ORAL
COMMUNITY
End: 2018-10-01

## 2018-09-07 RX ORDER — METRONIDAZOLE 500 MG/1
TABLET ORAL
COMMUNITY
End: 2018-09-10 | Stop reason: ALTCHOICE

## 2018-09-07 RX ORDER — ALBUTEROL SULFATE 90 UG/1
AEROSOL, METERED RESPIRATORY (INHALATION)
COMMUNITY
End: 2019-02-05 | Stop reason: SDUPTHER

## 2018-09-07 RX ORDER — AMOXICILLIN AND CLAVULANATE POTASSIUM 875; 125 MG/1; MG/1
TABLET, FILM COATED ORAL
COMMUNITY
End: 2018-09-10 | Stop reason: ALTCHOICE

## 2018-09-07 RX ORDER — THIAMINE MONONITRATE (VIT B1) 100 MG
TABLET ORAL
COMMUNITY
End: 2019-01-08 | Stop reason: SDUPTHER

## 2018-09-07 RX ORDER — CEPHALEXIN 500 MG/1
CAPSULE ORAL
COMMUNITY
End: 2018-09-10 | Stop reason: ALTCHOICE

## 2018-09-07 RX ORDER — OXYCODONE HYDROCHLORIDE 10 MG/1
TABLET ORAL
COMMUNITY
End: 2018-10-01

## 2018-09-07 RX ORDER — PREDNISONE 10 MG/1
TABLET ORAL
COMMUNITY
End: 2018-09-10 | Stop reason: ALTCHOICE

## 2018-09-10 ENCOUNTER — OFFICE VISIT (OUTPATIENT)
Dept: INTERNAL MEDICINE CLINIC | Facility: CLINIC | Age: 58
End: 2018-09-10
Payer: COMMERCIAL

## 2018-09-10 ENCOUNTER — TELEPHONE (OUTPATIENT)
Dept: INTERNAL MEDICINE CLINIC | Facility: CLINIC | Age: 58
End: 2018-09-10

## 2018-09-10 VITALS
TEMPERATURE: 97.8 F | BODY MASS INDEX: 20.83 KG/M2 | WEIGHT: 129.63 LBS | HEIGHT: 66 IN | DIASTOLIC BLOOD PRESSURE: 60 MMHG | HEART RATE: 84 BPM | SYSTOLIC BLOOD PRESSURE: 90 MMHG

## 2018-09-10 DIAGNOSIS — M54.12 CERVICAL RADICULOPATHY: ICD-10-CM

## 2018-09-10 DIAGNOSIS — Z93.2 ILEOSTOMY IN PLACE (HCC): ICD-10-CM

## 2018-09-10 DIAGNOSIS — Z01.818 PREOPERATIVE EVALUATION TO RULE OUT SURGICAL CONTRAINDICATION: ICD-10-CM

## 2018-09-10 DIAGNOSIS — I10 ESSENTIAL HYPERTENSION: Primary | ICD-10-CM

## 2018-09-10 PROCEDURE — 99213 OFFICE O/P EST LOW 20 MIN: CPT | Performed by: INTERNAL MEDICINE

## 2018-09-10 PROCEDURE — 3078F DIAST BP <80 MM HG: CPT | Performed by: INTERNAL MEDICINE

## 2018-09-10 PROCEDURE — 3074F SYST BP LT 130 MM HG: CPT | Performed by: INTERNAL MEDICINE

## 2018-09-10 PROCEDURE — 3008F BODY MASS INDEX DOCD: CPT | Performed by: INTERNAL MEDICINE

## 2018-09-10 RX ORDER — GABAPENTIN 300 MG/1
300 CAPSULE ORAL 3 TIMES DAILY
Qty: 90 CAPSULE | Refills: 3
Start: 2018-09-10 | End: 2019-04-12 | Stop reason: SDUPTHER

## 2018-09-10 NOTE — TELEPHONE ENCOUNTER
Diomedes DUMAS is calling to let us know they couldn't go out to draw blood work today  They will go tomorrow

## 2018-09-10 NOTE — PATIENT INSTRUCTIONS
Medicine Management Before Surgery   AMBULATORY CARE:   Medicine management before surgery  means creating a plan with your healthcare providers to stop, start, or change your medicines  The plan can help prevent complications during and after surgery  The plan may also prevent your surgery from getting canceled or delayed  How to create a medicine management plan:   · Tell all of your healthcare providers about your surgery  Schedule appointments to see them before your surgery  You may need blood work or tests before surgery to help your healthcare provider make changes to your medicines  Some of your medicines may need to be stopped several days to weeks before surgery  Other medicines may be stopped the night before, or need to be taken the day of surgery  Do not stop taking your medicine until you talk to your healthcare providers  · Bring a list of all of your medicines, vitamins, and dietary supplements to your preoperative appointment and on the day of surgery  You may also bring your pill bottles  This will help your anesthesiologist plan your anesthesia and keep you safe during and after surgery  · If your healthcare provider tells you to take medicine on the day of surgery, take it in the morning with a sip of water  On the day of your surgery, tell healthcare providers what medicines you did or did not take that day  Contact your healthcare provider if:   · You have questions about when to stop taking your medicine before surgery  · You have questions about what medicines to take or not take on the day of your surgery  · You need a prescription refilled before surgery  Blood thinner and antiplatelet medicine: If you take blood thinners or antiplatelet medicines, you may be at risk for heavy bleeding during or after surgery  Ask your healthcare provider if you need to stop taking your medicine, and when to stop   After you have stopped your medicine for several days, you may need blood tests  Your surgery may be canceled or rescheduled if your blood tests are abnormal   · You may need to stop taking your blood thinner, such as warfarin, 5 to 7 days before your surgery  While you are not taking your blood thinner, you may need daily injections of heparin  Heparin may decrease your risk for a blood clot while you are not taking your blood thinner  You may need heparin injections once per day, starting the day that you stop your blood thinner  You may continue heparin injections until the day of your surgery  Talk to your healthcare provider about heparin injections  You may need a family member or friend to give you the heparin injection  · New oral anticoagulants (NOAC), such as rivaroxaban, may be stopped closer to the day of your surgery  Your healthcare provider may tell you to stop your NOAC 1 to 3 days before surgery  You will not need heparin injections or other medicines while you are not taking your NOAC  Talk to your healthcare provider before you stop taking your NOAC  · Your healthcare provider may tell you to stop taking antiplatelet medicine, such as aspirin, 5 to 7 days before surgery  You may instead need to continue taking your medicine until the day of your surgery  Talk to your healthcare provider about when to stop these medicines  Heart medicine: Take your heart medicine on the day of your surgery unless your healthcare provider tells you not to  Heart medicines may decrease your risk for complications during or after surgery  If you have had a heart attack or chest pain during exercise, you may need to see a cardiologist before surgery  You may also need to see a cardiologist if you have coronary artery disease  The cardiologist may change your medicines or start you on a heart medicine called a beta-blocker  A beta-blocker controls your heart rate, and may decrease your risk for heart problems during or after surgery     Blood pressure medicine:   · Take your blood pressure medicine on the day of surgery unless your healthcare provider tells you not to  Ask if you should take your diuretic, angiotensin-converting enzyme (ACE) inhibitor, or angiotensin receptor blocker (ARB) on the day of surgery  These medicines may need to be stopped 1 to 2 days before surgery  Diuretic medicines may cause your blood pressure to go too low during surgery  If you take more than one blood pressure medicine, ask which medicine to take on the day of your surgery  · You may need your blood pressure checked a few days before your surgery  This may help your healthcare provider make changes to your medicine and get you ready for surgery  If your blood pressure is not controlled before your surgery, the surgery may be canceled or delayed  Diabetes medicine:   · Keep a diary of your blood sugar levels for 2 weeks before your surgery  Bring your diary to your preoperative appointments  This will help your healthcare providers plan how to change, or when to stop, your diabetes medicine before surgery  · Talk to your healthcare provider about managing your diabetes medicine before surgery  You may be told to lower your dose of long-acting or intermediate-acting insulin the night before, or the morning of, your surgery  You may also be told to skip your dose of fast-acting insulin on the morning of your surgery  Do not take your oral diabetes medicine on the day of your surgery unless your healthcare provider says it is okay  Oral diabetes medicine may cause your blood sugar to drop too low during or after surgery  · If you use an insulin pump, ask what you should do the night before, and morning of, your surgery  Your healthcare provider may tell you to continue or lower your basal dose, and skip your bolus doses, on the day of surgery  Bring extra supplies for you insulin pump, such as cartridges, tubing, and needles   When you arrive for surgery, tell all healthcare providers that you use an insulin pump  They will need to know how your pump works, what your basal rate is, and how to make changes to your basal rate  Your anesthesiologist may disconnect your insulin pump and use IV insulin instead to control your blood sugars during surgery  · Check your blood sugar level on the morning before your surgery  Fasting may cause your blood sugar to be low  The stress of surgery may instead cause your blood sugar to be high  At your preoperative appointment ask your healthcare provider what to do on the morning of surgery if your blood sugar is high or low  Your blood sugar will be monitored closely before, during, and after surgery  This will prevent complications such as poor wound healing and infection  Antiseizure medicine: Take your antiseizure medicine on the morning of your surgery unless your healthcare provider tells you not to  Your healthcare provider may give you antiseizure medicine in your IV before or after surgery  Nonopioid pain medicine:  Stop taking your nonopioid pain medicine 2 days before surgery  Nonopioid pain medicine, such as NSAIDs, may increase your risk for bleeding during surgery  Other nonopioid medicine, such as gabapentin, may interfere with other medicines you may get during surgery  Steroids: Take your steroid medicine on the day of your surgery unless your healthcare provider tells you not to  Your healthcare provider may give you an extra, larger dose of IV steroids before or during your surgery to prevent low blood pressure  Hormones:  Take your thyroid medicine on the morning of your surgery unless your healthcare provider tells you not to  Ask your healthcare provider if you should take other hormone medicines on the day of surgery  Immunosuppressants: You may need to stop taking your immunosuppressants several days before, or the night before, your surgery  Immunosuppressants may increase your risk for wound infection and prevent wound healing  Talk to your healthcare provider about when to stop your immunosuppressants  Diet medicines:  Ask your healthcare provider when to stop taking diet medicine  Some diet medicines need to be stopped several days to weeks before surgery  Diet medicine may prevent other medicines from working or cause complications during surgery  Vitamins and herbal supplements:  Stop taking herbal supplements 1 week before surgery  Most vitamins can be taken up to the day before surgery  Ask your healthcare provider if you need to stop taking any vitamins before surgery  Some vitamins and herbal supplements may increase your risk for bleeding during surgery  They can also prevent anesthesia medicines from working right or increase your risk for a heart attack or stroke during surgery  Asthma or medicine for COPD:  Take your inhalers and asthma or COPD medicine on the morning of surgery  Bring your inhalers with you to your surgery  Anxiety, depression, or psychiatric medicine: Take your anxiety, depression, or psychiatric medicine on the morning of surgery, unless your healthcare provider tells you not to  Tell your anesthesiologist if you take a monoamine oxidase inhibitor (MAOI), such as phenelzine sulfate  To prevent complications, your anesthesiologist may need to change the type of anesthesia that you will get during surgery  Other medicines:  Ask your healthcare provider if and when you should stop take any other type of medicine before surgery  Follow up with your healthcare provider as directed:  Write down your questions so you remember to ask them during your visits  © 2017 [employer] Information is for End User's use only and may not be sold, redistributed or otherwise used for commercial purposes  All illustrations and images included in CareNotes® are the copyrighted property of [employer]  or [employer] Valle[employer]  The above information is an  only   It is not intended as medical advice for individual conditions or treatments  Talk to your doctor, nurse or pharmacist before following any medical regimen to see if it is safe and effective for you

## 2018-09-10 NOTE — PROGRESS NOTES
ASSESSMENT/PLAN:  Diagnoses and all orders for this visit:    Essential hypertension  Comments:  Regular home blood pressure 328 to 846 systolic  Blood pressure slightly low today  Recommend better fluid intake    Preoperative evaluation to rule out surgical contraindication  Comments:  Patient evaluated for surgery for reversal of ostomy  Please review below for patient's surgical risk  Cervical radiculopathy  -     gabapentin (NEURONTIN) 300 mg capsule; Take 1 capsule (300 mg total) by mouth 3 (three) times a day    Ileostomy in place [facility])    Other orders  -     Discontinue: amoxicillin-clavulanate (AUGMENTIN) 875-125 mg per tablet; amoxicillin 875 mg-potassium clavulanate 125 mg tablet  -     Discontinue: cephalexin (KEFLEX) 500 mg capsule; cephalexin 500 mg capsule  -     metoprolol succinate (TOPROL-XL) 50 mg 24 hr tablet; metoprolol succinate ER 50 mg tablet,extended release 24 hr  -     metoprolol tartrate (LOPRESSOR) 25 mg tablet;   -     Discontinue: metroNIDAZOLE (FLAGYL) 500 mg tablet; metronidazole 500 mg tablet  -     nicotine (NICODERM CQ) 7 mg/24hr TD 24 hr patch; nicotine 7 mg/24 hr daily transdermal patch  -     oxyCODONE (ROXICODONE) 10 MG TABS; oxycodone 10 mg tablet  -     Discontinue: predniSONE 10 mg tablet; prednisone 10 mg tablet  -     albuterol (VENTOLIN HFA) 90 mcg/act inhaler; Ventolin HFA 90 mcg/actuation aerosol inhaler  -     thiamine (VITAMIN B1) 100 mg tablet; Vitamin B-1 100 mg tablet     The medication regimen reconciled per historical documentation  Patient reports that he has been taking 300 mg 3 times daily gabapentin as well as 500 mg 3 times daily of mesalamine  Documentation here for patient's medical regimen was updated the to follow in line with patient's home regimen as he denied any issues with this current regimen      Health Maintenance:  Influenza Vaccine:   Outside flu season  Pneumonia Vaccine:   Received PCV 13 on December 8, 2016  TDaP Vaccine: Up to date - TDaP on 7/10/2017  AAA screening: Outside screening window    Preoperative Evaluation:  Surgery Type: Ileostomy Reversal  CAD: Yes - History of stent to RCA  CHF: Yes - EF noted to be 45% with wall motion abnormalities distribution of prior MRI  History of CVA: none  Diabetes Mellitus on Insulin Therapy: no  Pre-operative creatinine:  1 19    Given patient's characteristics, patient is Moderate Risk - Class 3 risk for a Moderate Risk procedure with a 6 6% of major cardiac events    Schedule a follow-up appointment in 6months as needed  CHIEF COMPLAINT:   Here for preoperative evaluation    HISTORY OF PRESENT ILLNESS:  Robin Spears is a 66 y o  with PMH significant for hypertension, COPD, systolic heart failure, coronary artery disease status post stent to RCA in 2012, paroxysmal atrial fibrillation - not on chronic anticoagulation  Robin Spears is in clinic today for follow up after hospital discharge on 9/3 as well as pre-operative evaluation for reversal of ileostomy  Patient was recently admitted to [facility] on 08/30 for episode potential hematemesis, nausea, vomiting  Patient's hemoglobin was noted to be stable throughout his stay he was found have an elevated creatinine as result of dehydration  He received aggressive fluid rehydration with improvement in his creatinine  Patient denies any complaints on examination this afternoon  He reports his blood pressure is slightly lower today in comparison to his home readings where he is typically 428-905 mm Hg for systolic blood pressure  He denies any lightheadedness, nausea, vomiting  He continues to monitor the amount of output from his ileostomy and reports appropriate fluid intake to overcome the amount of fluid which he loses on a daily basis through the ileostomy  He denies any fever, chills, nausea, vomiting, chest pain, abdominal pain  He does report some mild lower back pain along the spinal process    He denies any erythema or drainage to that site  He reports that it feels as though it is getting better over the last few days but wanted to be evaluated by his primary care provider  Review of Systems   Constitutional: Negative for activity change, appetite change, chills, diaphoresis, fatigue and fever  Eyes: Negative for discharge and itching  Respiratory: Negative for cough, chest tightness, shortness of breath and wheezing  Cardiovascular: Negative for chest pain, palpitations and leg swelling  Gastrointestinal: Negative for abdominal distention, abdominal pain, constipation, diarrhea, nausea and vomiting  Endocrine: Negative for cold intolerance and heat intolerance  Musculoskeletal: Positive for back pain  Negative for arthralgias, gait problem and myalgias  Skin: Negative for color change, pallor, rash and wound  Allergic/Immunologic: Negative for environmental allergies and food allergies  Neurological: Negative for dizziness, weakness, light-headedness and headaches  Hematological: Negative for adenopathy  Psychiatric/Behavioral: Negative for agitation, behavioral problems, confusion, decreased concentration and dysphoric mood  OBJECTIVE:  Vitals:    09/10/18 1438   BP: 90/60   Pulse: 84   Temp: 97 8 °F (36 6 °C)   Weight: 58 8 kg (129 lb 10 1 oz)   Height: 5' 6" (1 676 m)     Physical Exam   Constitutional: He is oriented to person, place, and time  He appears well-nourished  He appears cachectic  He is active and cooperative  No distress  Seated comfortably in a wheelchair this afternoon  HENT:   Head: Normocephalic and atraumatic  Eyes: Conjunctivae and EOM are normal  Pupils are equal, round, and reactive to light  Right eye exhibits no discharge  Left eye exhibits no discharge  Neck: Normal range of motion  Neck supple  No JVD present  Cardiovascular: Normal rate, regular rhythm, normal heart sounds and intact distal pulses  Exam reveals no gallop and no friction rub  No murmur heard  Pulmonary/Chest: Effort normal and breath sounds normal  No respiratory distress  He has no wheezes  He has no rales  He exhibits no tenderness  Abdominal: Soft  Bowel sounds are normal  He exhibits no distension  There is no tenderness  Musculoskeletal: Normal range of motion  He exhibits no edema, tenderness or deformity  Neurological: He is alert and oriented to person, place, and time  No cranial nerve deficit  Skin: Skin is warm and dry  No rash noted  He is not diaphoretic  No erythema  Psychiatric: He has a normal mood and affect  His behavior is normal    Vitals reviewed          Current Outpatient Prescriptions:     albuterol (2 5 mg/3 mL) 0 083 % nebulizer solution, Take 3 mL (2 5 mg total) by nebulization every 4 (four) hours as needed for wheezing as needed for wheezing, Disp: 150 mL, Rfl: 0    albuterol (VENTOLIN HFA) 90 mcg/act inhaler, Ventolin HFA 90 mcg/actuation aerosol inhaler, Disp: , Rfl:     amiodarone 200 mg tablet, Take 1 tablet (200 mg total) by mouth daily, Disp: 30 tablet, Rfl: 3    aspirin 81 mg chewable tablet, Chew 1 tablet (81 mg total) daily, Disp: 30 tablet, Rfl: 0    folic acid (FOLVITE) 1 mg tablet, Take 1 tablet (1 mg total) by mouth daily, Disp: 30 tablet, Rfl: 0    gabapentin (NEURONTIN) 400 mg capsule, Take 1 capsule (400 mg total) by mouth 3 (three) times a day, Disp: 90 capsule, Rfl: 0    lisinopril (ZESTRIL) 2 5 mg tablet, Take 1 tablet (2 5 mg total) by mouth daily, Disp: 30 tablet, Rfl: 0    mesalamine (PENTASA) 500 mg CR capsule, Take 1 capsule (500 mg total) by mouth 3 (three) times a day, Disp: , Rfl: 0    Multiple Vitamins-Minerals (CENTRUM SILVER 50+MEN PO), Centrum Silver Men, Disp: , Rfl:     Nebulizers (AERONEB GO NEBULIZER HANDSET) MISC, by Does not apply route 2 (two) times a day as needed (wheezing), Disp: 1 each, Rfl: 0    nystatin (MYCOSTATIN) ointment, Apply topically 2 (two) times a day, Disp: 30 g, Rfl: 0   thiamine (VITAMIN B1) 100 mg tablet, Vitamin B-1 100 mg tablet, Disp: , Rfl:     Tiotropium Bromide Monohydrate (SPIRIVA RESPIMAT) 2 5 MCG/ACT AERS, Inhale 1 Act (2 5 mcg total) daily, Disp: 1 Inhaler, Rfl: 0    traMADol (ULTRAM) 50 mg tablet, Take 100 mg by mouth every 12 (twelve) hours, Disp: , Rfl: 0    VENTOLIN  (90 Base) MCG/ACT inhaler, Inhale 2 puffs every 4 (four) hours as needed for wheezing or shortness of breath, Disp: 1 Inhaler, Rfl: 0    vitamin A 10,000 units capsule, Take 3 capsules (30,000 Units total) by mouth daily, Disp: 10 capsule, Rfl: 0    amoxicillin-clavulanate (AUGMENTIN) 875-125 mg per tablet, amoxicillin 875 mg-potassium clavulanate 125 mg tablet, Disp: , Rfl:     atorvastatin (LIPITOR) 40 mg tablet, Take 1 tablet (40 mg total) by mouth daily with dinner for 30 days, Disp: 30 tablet, Rfl: 0    cephalexin (KEFLEX) 500 mg capsule, cephalexin 500 mg capsule, Disp: , Rfl:     metoprolol succinate (TOPROL-XL) 50 mg 24 hr tablet, metoprolol succinate ER 50 mg tablet,extended release 24 hr, Disp: , Rfl:     metoprolol tartrate (LOPRESSOR) 25 mg tablet, , Disp: , Rfl:     metroNIDAZOLE (FLAGYL) 500 mg tablet, metronidazole 500 mg tablet, Disp: , Rfl:     nicotine (NICODERM CQ) 7 mg/24hr TD 24 hr patch, nicotine 7 mg/24 hr daily transdermal patch, Disp: , Rfl:     oxyCODONE (ROXICODONE) 10 MG TABS, oxycodone 10 mg tablet, Disp: , Rfl:     predniSONE 10 mg tablet, prednisone 10 mg tablet, Disp: , Rfl:     thiamine 100 MG tablet, Take 1 tablet (100 mg total) by mouth daily (Patient not taking: Reported on 9/10/2018 ), Disp: 30 tablet, Rfl: 3    Past Medical History:   Diagnosis Date    Anxiety     Asthma     Cardiac disease     Chest pain     Colitis     Colon polyps     COPD (chronic obstructive pulmonary disease) (HCC)     Coronary artery disease     Diverticulitis     Esophageal reflux     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HCC)     Hyperlipidemia     Hypertension     IBD (inflammatory bowel disease)     [facility]ocardial infarction [facility])     Old myocardial infarction     Perforation of colon (HCC)     Type 2 diabetes, diet controlled ([facility] [address] )     Ulcerative colitis ([facility] [address] )      Past Surgical History:   Procedure Laterality Date    ANGIOPLASTY      COLON SURGERY      COLONOSCOPY N/A 10/24/2016    Procedure: COLONOSCOPY;  Surgeon: Edel Sebastian MD;  [facility]; Service:     CORONARY ANGIOPLASTY WITH STENT PLACEMENT      ESOPHAGOGASTRODUODENOSCOPY N/A 10/24/2016    Procedure: ESOPHAGOGASTRODUODENOSCOPY (EGD); Surgeon: Edel Sebastian MD;  [facility]; Service:     EXPLORATORY LAPAROTOMY W/ BOWEL RESECTION N/A 5/22/2018    Procedure: EXPLORATORY LAPAROTOMY, ILIOCOLECTOMY, ILIOCOLONIC ANASTAMOSIS, LOOP ILIOSTOMY, REPAIR OF SEROSAL TEAR, EXTENSIVE LYSIS OF ADHESIONS, WOUND VAC PLACEMENT;  Surgeon: Edel Sebastian MD;  [facility];  Service: Colorectal    HEMICOLECTOMY      OTHER SURGICAL HISTORY      stent indications acute myocardial infarction    VA COLONOSCOPY FLX DX W/COLLJ SPEC WHEN PFRMD N/A 2/6/2018    Procedure: COLONOSCOPY;  Surgeon: Edel Sebastian MD;  [facility]; Service: Colorectal    TONSILLECTOMY       Social History     Social History    Marital status:      Spouse name: N/A    Number of children: N/A    Years of education: N/A     Occupational History    Not on file       Social History Main Topics    Smoking status: Current Every Day Smoker     Packs/day: 0 00     Years: 5 00     Types: Cigars    Smokeless tobacco: Never Used      Comment: 3 cigars per day    Alcohol use No      Comment: pt "quit about 4 months ago"    Drug use: No    Sexual activity: Yes     Partners: Female     Birth control/ protection: None     Other Topics Concern    Not on file     Social History Narrative    No narrative on file     Family History   Problem Relation Age of Onset    Coronary artery disease Father     Crohn's disease Father     Stroke Father     Diabetes Family        ==  MD Kayleigh Pham 73 Internal Medicine PGY-3    [facility]  [address] , [address] [address] [place] [address] [facility]  Office: [phone]  Fax: [phone]

## 2018-09-11 ENCOUNTER — PATIENT OUTREACH (OUTPATIENT)
Dept: INTERNAL MEDICINE CLINIC | Facility: CLINIC | Age: 58
End: 2018-09-11

## 2018-09-12 ENCOUNTER — TELEPHONE (OUTPATIENT)
Dept: INTERNAL MEDICINE CLINIC | Facility: CLINIC | Age: 58
End: 2018-09-12

## 2018-09-12 NOTE — TELEPHONE ENCOUNTER
Called patient and made him aware he still needs to have his BMP drawn  Explained that it would be beneficial for us to know the standing of his kidneys prior to surgery and that he should have it done a week before his surgery is scheduled  Patient was concerned that his surgery would get cancelled if something is not right  I advised the patient that he should have it done and it didn't mean his surgery would get cancelled

## 2018-09-12 NOTE — TELEPHONE ENCOUNTER
I never said that he did not need the labs drawn, I told him that it would not necessarily affect his pre-operative evaluation   It would be beneficial for us to know the standing of his kidneys prior to surgery, so when he has his surgery date we can have his BMP drawn the week before

## 2018-09-21 DIAGNOSIS — F10.239 ALCOHOL DEPENDENCE WITH WITHDRAWAL WITH COMPLICATION (HCC): ICD-10-CM

## 2018-09-21 RX ORDER — FOLIC ACID 1 MG/1
TABLET ORAL
Qty: 30 TABLET | Refills: 1 | Status: SHIPPED | OUTPATIENT
Start: 2018-09-21 | End: 2018-11-20 | Stop reason: SDUPTHER

## 2018-09-25 DIAGNOSIS — I25.10 CORONARY ARTERY DISEASE INVOLVING NATIVE CORONARY ARTERY OF NATIVE HEART WITHOUT ANGINA PECTORIS: ICD-10-CM

## 2018-09-26 RX ORDER — ATORVASTATIN CALCIUM 40 MG/1
40 TABLET, FILM COATED ORAL
Qty: 30 TABLET | Refills: 3 | Status: SHIPPED | OUTPATIENT
Start: 2018-09-26 | End: 2019-02-27 | Stop reason: SDUPTHER

## 2018-09-28 ENCOUNTER — TELEPHONE (OUTPATIENT)
Dept: INTERNAL MEDICINE CLINIC | Facility: CLINIC | Age: 58
End: 2018-09-28

## 2018-09-28 ENCOUNTER — PATIENT OUTREACH (OUTPATIENT)
Dept: INTERNAL MEDICINE CLINIC | Facility: CLINIC | Age: 58
End: 2018-09-28

## 2018-09-28 DIAGNOSIS — Z93.2 ILEOSTOMY IN PLACE (HCC): ICD-10-CM

## 2018-09-28 DIAGNOSIS — I42.9 HEART FAILURE, SYSTOLIC, DUE TO IDIOPATHIC CARDIOMYOPATHY (HCC): ICD-10-CM

## 2018-09-28 DIAGNOSIS — I50.20 HEART FAILURE, SYSTOLIC, DUE TO IDIOPATHIC CARDIOMYOPATHY (HCC): ICD-10-CM

## 2018-09-28 DIAGNOSIS — N17.9 AKI (ACUTE KIDNEY INJURY) (HCC): Primary | ICD-10-CM

## 2018-09-28 NOTE — PROGRESS NOTES
Outpatient Care Management Note:    Pt called me to make me aware that he is scheduled to have his ileostomy reversal on 10/5/18 with Dr Melyssa Castro and will need to go for colonoscopy day prior on 10/4/18  Pt reports his grandson will pick him up each time and may need to rely on [facility] to get him to the hospital  Pt reports he did his in person evaluation for [facility] and got notice he was approved  Pt reports he needs to f/u with them because he is not sure if he needs to get tickets or pay for rides  Pt reports he can call and f/u  Pt reports is still living with his daughter and has her and his 3 grandsons for support and help  Pt reports he is feeling well and managing better at home  [facility] physical therapist is still active and seeing pt  Pt reports PT is to come today 9/28 to see him  Pt reports is doing the steps but very slowly  Pt has be using cane but has walker if going walking longer distances  Pt does have a commode at home as bathroom is on second floor and may be an issue to get to after his reversal  Pt reports PT is working with him on doing the steps in the house  Pt reports he has all his medication and taking as prescribed and some refills were sent to the pharmacy and his daughter will be picking them up  Daughter is still assisting with managing medication  Pt does not have any further questions, concerns, or needs at this time  Pt wanted his PCP to be aware of his upcoming surgery that is scheduled  I told pt I would make PCP aware and pt has office # and my # if needed

## 2018-09-28 NOTE — TELEPHONE ENCOUNTER
There is already a standing order for a BMP that was placed on day of discharge on 9/3 that he has not had collected, and he was informed to have that lab collected approximately one week prior to his surgery  To make sure there is no issues, I have also placed an order for a BMP to make sure that there is one in the system

## 2018-10-01 ENCOUNTER — ANESTHESIA EVENT (OUTPATIENT)
Dept: PERIOP | Facility: HOSPITAL | Age: 58
DRG: 230 | End: 2018-10-01
Payer: COMMERCIAL

## 2018-10-01 NOTE — TELEPHONE ENCOUNTER
Pt called me this morning and left message that he did a phone interview with preadmission testing for his ileostomy reversal for this week  Pt was told he does not need any bloodwork done prior to Friday 10/5  I did call pt no answer  Left message and made him aware that Dr Amy Chahal had wanted him to do the BMP 1 week prior to surgery to make sure there were no issues and that I would make Dr Amy Chahal aware of what preadmission testing told him on phone today

## 2018-10-04 ENCOUNTER — ANESTHESIA (OUTPATIENT)
Dept: GASTROENTEROLOGY | Facility: HOSPITAL | Age: 58
End: 2018-10-04
Payer: COMMERCIAL

## 2018-10-04 ENCOUNTER — ANESTHESIA EVENT (OUTPATIENT)
Dept: GASTROENTEROLOGY | Facility: HOSPITAL | Age: 58
End: 2018-10-04
Payer: COMMERCIAL

## 2018-10-04 ENCOUNTER — HOSPITAL ENCOUNTER (OUTPATIENT)
Facility: HOSPITAL | Age: 58
Setting detail: OUTPATIENT SURGERY
Discharge: HOME | End: 2018-10-04
Attending: SURGERY | Admitting: SURGERY
Payer: COMMERCIAL

## 2018-10-04 VITALS
HEART RATE: 88 BPM | BODY MASS INDEX: 21.69 KG/M2 | HEIGHT: 66 IN | SYSTOLIC BLOOD PRESSURE: 100 MMHG | RESPIRATION RATE: 16 BRPM | DIASTOLIC BLOOD PRESSURE: 59 MMHG | TEMPERATURE: 97.7 F | OXYGEN SATURATION: 100 % | WEIGHT: 135 LBS

## 2018-10-04 RX ORDER — EPHEDRINE SULFATE 50 MG/ML
INJECTION, SOLUTION INTRAVENOUS AS NEEDED
Status: DISCONTINUED | OUTPATIENT
Start: 2018-10-04 | End: 2018-10-04 | Stop reason: SURG

## 2018-10-04 RX ORDER — SODIUM CHLORIDE, SODIUM LACTATE, POTASSIUM CHLORIDE, CALCIUM CHLORIDE 600; 310; 30; 20 MG/100ML; MG/100ML; MG/100ML; MG/100ML
125 INJECTION, SOLUTION INTRAVENOUS CONTINUOUS
Status: DISCONTINUED | OUTPATIENT
Start: 2018-10-04 | End: 2018-10-04 | Stop reason: HOSPADM

## 2018-10-04 RX ORDER — PROPOFOL 10 MG/ML
INJECTION, EMULSION INTRAVENOUS AS NEEDED
Status: DISCONTINUED | OUTPATIENT
Start: 2018-10-04 | End: 2018-10-04 | Stop reason: SURG

## 2018-10-04 RX ORDER — SODIUM CHLORIDE 9 MG/ML
50 INJECTION, SOLUTION INTRAVENOUS CONTINUOUS
Status: DISCONTINUED | OUTPATIENT
Start: 2018-10-04 | End: 2018-10-04 | Stop reason: HOSPADM

## 2018-10-04 RX ORDER — PROPOFOL 10 MG/ML
INJECTION, EMULSION INTRAVENOUS CONTINUOUS PRN
Status: DISCONTINUED | OUTPATIENT
Start: 2018-10-04 | End: 2018-10-04 | Stop reason: SURG

## 2018-10-04 RX ADMIN — PROPOFOL 80 MG: 10 INJECTION, EMULSION INTRAVENOUS at 11:31

## 2018-10-04 RX ADMIN — SODIUM CHLORIDE 50 ML/HR: 0.9 INJECTION, SOLUTION INTRAVENOUS at 11:22

## 2018-10-04 RX ADMIN — EPHEDRINE SULFATE 10 MG: 50 INJECTION, SOLUTION INTRAMUSCULAR; INTRAVENOUS; SUBCUTANEOUS at 12:05

## 2018-10-04 RX ADMIN — PROPOFOL 80 MCG/KG/MIN: 10 INJECTION, EMULSION INTRAVENOUS at 11:32

## 2018-10-04 RX ADMIN — EPHEDRINE SULFATE 10 MG: 50 INJECTION, SOLUTION INTRAMUSCULAR; INTRAVENOUS; SUBCUTANEOUS at 11:57

## 2018-10-04 NOTE — OP NOTE
**** GI/ENDOSCOPY REPORT ****     PATIENT NAME: Marianne CEDENO ------ VISIT ID:  Patient ID:   [id number] YOB: 1960     INTRODUCTION: Colonoscopy - A 66 male patient presents for an outpatient   Colonoscopy at [address] [place] [address]  PREVIOUS COLONOSCOPY: s/p colectomy with ileocolic anastomosis     INDICATIONS: Surveillance  CONSENT:  The benefits, risks, and alternatives to the procedure were   discussed and informed consent was obtained from the patient  PREPARATION: EKG, pulse, pulse oximetry and blood pressure were monitored   throughout the procedure  The patient was identified by myself both   verbally and by visual inspection of ID band  Airway Assessment   Classification: Airway class 2 - Visualization of the soft palate, fauces   and uvula  ASA Classification: Class 2 - Patient has mild to moderate   systemic disturbance that may or may not be related to the disorder   requiring surgery  MEDICATIONS: Anesthesia-check records     PROCEDURE:  The endoscope was passed without difficulty through the anus   under direct visualization  The scope was advanced to the ileocolic   anastomosis which was widely patent  The scope was withdrawn and the   mucosa was carefully examined  The quality of the preparation was good  Cecal Intubation Time: Minute(s) Scope Withdrawal Time: Minute(s)     RECTAL EXAM: Normal rectal exam      FINDINGS: Ileostomy s/p colectomy with ileocolic anastomosis     COMPLICATIONS: There were no complications  IMPRESSIONS: Ileostomy s/p colectomy with ileocolic anastomosis     RECOMMENDATIONS: Close ileostomy     ESTIMATED BLOOD LOSS:none     PATHOLOGY SPECIMENS: none     PROCEDURE CODES: Colonoscopy     ICD-9 Codes:     ICD-10 Codes:     PERFORMED BY: gregorio Scott  on 10/04/2018  I personally   [address] [facility] and Brooke Hunt DO in the performance of the   entire documented procedure    Version 1, electronically signed by ARGENIS Jeffery  on   10/04/2018 at 12:15

## 2018-10-04 NOTE — ANESTHESIA PREPROCEDURE EVALUATION
Review of Systems/Medical History  Patient summary reviewed  Chart reviewed  No history of anesthetic complications     Cardiovascular  Hyperlipidemia, Hypertension , Past MI , CAD , CAD status: 1VD, Cardiac stents  CHF ,    Pulmonary  COPD moderate- medication dependent , Asthma ,        GI/Hepatic  Negative GI/hepatic ROS   GERD well controlled, Bowel prep            Endo/Other  Diabetes well controlled Diet controlled,      GYN       Hematology  Anemia ,     Musculoskeletal       Neurology   Psychology           Physical Exam    Airway    Mallampati score: II  TM Distance: >3 FB  Neck ROM: full     Dental   No notable dental hx     Cardiovascular  Rhythm: regular, Rate: normal, Cardiovascular exam normal    Pulmonary  Pulmonary exam normal     Other Findings        Anesthesia Plan  ASA Score- 4     Anesthesia Type- IV sedation with anesthesia with ASA Monitors  Additional Monitors:   Airway Plan:     Comment: Discussed with pt the possibility under sedation to have recall or mild discomfort        Plan Factors-    Induction- intravenous  Postoperative Plan-     Informed Consent- Anesthetic plan and risks discussed with patient  I personally reviewed this patient with the CRNA  Discussed and agreed on the Anesthesia Plan with the CRNA  Alexis Miller

## 2018-10-05 ENCOUNTER — ANESTHESIA (OUTPATIENT)
Dept: PERIOP | Facility: HOSPITAL | Age: 58
DRG: 230 | End: 2018-10-05
Payer: COMMERCIAL

## 2018-10-05 ENCOUNTER — HOSPITAL ENCOUNTER (INPATIENT)
Facility: HOSPITAL | Age: 58
LOS: 10 days | Discharge: HOME HEALTH | DRG: 230 | End: 2018-10-15
Attending: SURGERY | Admitting: SURGERY
Payer: COMMERCIAL

## 2018-10-05 DIAGNOSIS — K50.918 CROHN'S DISEASE WITH OTHER COMPLICATION, UNSPECIFIED GASTROINTESTINAL TRACT LOCATION (HCC): ICD-10-CM

## 2018-10-05 DIAGNOSIS — Z43.2 ENCOUNTER FOR ATTENTION TO ILEOSTOMY (HCC): ICD-10-CM

## 2018-10-05 DIAGNOSIS — N17.0 ACUTE RENAL FAILURE WITH TUBULAR NECROSIS (HCC): Primary | ICD-10-CM

## 2018-10-05 LAB
ABO GROUP BLD: NORMAL
ANION GAP SERPL CALCULATED.3IONS-SCNC: 8 MMOL/L (ref 4–13)
BLD GP AB SCN SERPL QL: NEGATIVE
BUN SERPL-MCNC: 37 MG/DL (ref 5–25)
CALCIUM SERPL-MCNC: 8.8 MG/DL (ref 8.3–10.1)
CHLORIDE SERPL-SCNC: 83 MMOL/L (ref 100–108)
CO2 SERPL-SCNC: 26 MMOL/L (ref 21–32)
CREAT SERPL-MCNC: 1.78 MG/DL (ref 0.6–1.3)
GFR SERPL CREATININE-BSD FRML MDRD: 42 ML/MIN/1.73SQ M
GLUCOSE P FAST SERPL-MCNC: 96 MG/DL (ref 65–99)
GLUCOSE SERPL-MCNC: 108 MG/DL (ref 65–140)
GLUCOSE SERPL-MCNC: 126 MG/DL (ref 65–140)
GLUCOSE SERPL-MCNC: 96 MG/DL (ref 65–140)
POTASSIUM SERPL-SCNC: 3.7 MMOL/L (ref 3.5–5.3)
RH BLD: POSITIVE
SODIUM SERPL-SCNC: 117 MMOL/L (ref 136–145)
SPECIMEN EXPIRATION DATE: NORMAL

## 2018-10-05 PROCEDURE — 82948 REAGENT STRIP/BLOOD GLUCOSE: CPT

## 2018-10-05 PROCEDURE — 86850 RBC ANTIBODY SCREEN: CPT | Performed by: SURGERY

## 2018-10-05 PROCEDURE — 88304 TISSUE EXAM BY PATHOLOGIST: CPT | Performed by: PATHOLOGY

## 2018-10-05 PROCEDURE — 0WQF0ZZ REPAIR ABDOMINAL WALL, OPEN APPROACH: ICD-10-PCS | Performed by: SURGERY

## 2018-10-05 PROCEDURE — 86900 BLOOD TYPING SEROLOGIC ABO: CPT | Performed by: SURGERY

## 2018-10-05 PROCEDURE — 0DBB0ZZ EXCISION OF ILEUM, OPEN APPROACH: ICD-10-PCS | Performed by: SURGERY

## 2018-10-05 PROCEDURE — 86901 BLOOD TYPING SEROLOGIC RH(D): CPT | Performed by: SURGERY

## 2018-10-05 PROCEDURE — 80048 BASIC METABOLIC PNL TOTAL CA: CPT | Performed by: INTERNAL MEDICINE

## 2018-10-05 PROCEDURE — 94760 N-INVAS EAR/PLS OXIMETRY 1: CPT

## 2018-10-05 RX ORDER — HEPARIN SODIUM 5000 [USP'U]/ML
5000 INJECTION, SOLUTION INTRAVENOUS; SUBCUTANEOUS EVERY 8 HOURS SCHEDULED
Status: DISCONTINUED | OUTPATIENT
Start: 2018-10-05 | End: 2018-10-15 | Stop reason: HOSPADM

## 2018-10-05 RX ORDER — FOLIC ACID 1 MG/1
1000 TABLET ORAL DAILY
Status: DISCONTINUED | OUTPATIENT
Start: 2018-10-05 | End: 2018-10-08

## 2018-10-05 RX ORDER — SODIUM CHLORIDE 9 MG/ML
100 INJECTION, SOLUTION INTRAVENOUS CONTINUOUS
Status: DISCONTINUED | OUTPATIENT
Start: 2018-10-05 | End: 2018-10-06

## 2018-10-05 RX ORDER — FENTANYL CITRATE 50 UG/ML
INJECTION, SOLUTION INTRAMUSCULAR; INTRAVENOUS AS NEEDED
Status: DISCONTINUED | OUTPATIENT
Start: 2018-10-05 | End: 2018-10-05 | Stop reason: SURG

## 2018-10-05 RX ORDER — MIDAZOLAM HYDROCHLORIDE 1 MG/ML
INJECTION INTRAMUSCULAR; INTRAVENOUS AS NEEDED
Status: DISCONTINUED | OUTPATIENT
Start: 2018-10-05 | End: 2018-10-05 | Stop reason: SURG

## 2018-10-05 RX ORDER — GABAPENTIN 400 MG/1
400 CAPSULE ORAL 3 TIMES DAILY
Status: DISCONTINUED | OUTPATIENT
Start: 2018-10-05 | End: 2018-10-08

## 2018-10-05 RX ORDER — FENTANYL CITRATE/PF 50 MCG/ML
25 SYRINGE (ML) INJECTION
Status: DISCONTINUED | OUTPATIENT
Start: 2018-10-05 | End: 2018-10-05 | Stop reason: HOSPADM

## 2018-10-05 RX ORDER — PROPOFOL 10 MG/ML
INJECTION, EMULSION INTRAVENOUS AS NEEDED
Status: DISCONTINUED | OUTPATIENT
Start: 2018-10-05 | End: 2018-10-05 | Stop reason: SURG

## 2018-10-05 RX ORDER — OXYCODONE HYDROCHLORIDE 5 MG/1
5 TABLET ORAL EVERY 4 HOURS PRN
Status: DISCONTINUED | OUTPATIENT
Start: 2018-10-05 | End: 2018-10-08

## 2018-10-05 RX ORDER — KETOROLAC TROMETHAMINE 30 MG/ML
INJECTION, SOLUTION INTRAMUSCULAR; INTRAVENOUS AS NEEDED
Status: DISCONTINUED | OUTPATIENT
Start: 2018-10-05 | End: 2018-10-05 | Stop reason: SURG

## 2018-10-05 RX ORDER — ONDANSETRON 2 MG/ML
4 INJECTION INTRAMUSCULAR; INTRAVENOUS ONCE AS NEEDED
Status: DISCONTINUED | OUTPATIENT
Start: 2018-10-05 | End: 2018-10-05 | Stop reason: HOSPADM

## 2018-10-05 RX ORDER — ASPIRIN 81 MG/1
81 TABLET, CHEWABLE ORAL DAILY
Status: DISCONTINUED | OUTPATIENT
Start: 2018-10-06 | End: 2018-10-15 | Stop reason: HOSPADM

## 2018-10-05 RX ORDER — ONDANSETRON 2 MG/ML
4 INJECTION INTRAMUSCULAR; INTRAVENOUS EVERY 6 HOURS PRN
Status: DISCONTINUED | OUTPATIENT
Start: 2018-10-05 | End: 2018-10-15 | Stop reason: HOSPADM

## 2018-10-05 RX ORDER — LIDOCAINE HYDROCHLORIDE 10 MG/ML
INJECTION, SOLUTION INFILTRATION; PERINEURAL AS NEEDED
Status: DISCONTINUED | OUTPATIENT
Start: 2018-10-05 | End: 2018-10-05 | Stop reason: SURG

## 2018-10-05 RX ORDER — ALBUTEROL SULFATE 2.5 MG/3ML
2.5 SOLUTION RESPIRATORY (INHALATION) EVERY 4 HOURS PRN
Status: DISCONTINUED | OUTPATIENT
Start: 2018-10-05 | End: 2018-10-15 | Stop reason: HOSPADM

## 2018-10-05 RX ORDER — ROCURONIUM BROMIDE 10 MG/ML
INJECTION, SOLUTION INTRAVENOUS AS NEEDED
Status: DISCONTINUED | OUTPATIENT
Start: 2018-10-05 | End: 2018-10-05 | Stop reason: SURG

## 2018-10-05 RX ORDER — MAGNESIUM HYDROXIDE 1200 MG/15ML
LIQUID ORAL AS NEEDED
Status: DISCONTINUED | OUTPATIENT
Start: 2018-10-05 | End: 2018-10-05 | Stop reason: HOSPADM

## 2018-10-05 RX ORDER — LABETALOL HYDROCHLORIDE 5 MG/ML
10 INJECTION, SOLUTION INTRAVENOUS AS NEEDED
Status: DISCONTINUED | OUTPATIENT
Start: 2018-10-05 | End: 2018-10-05 | Stop reason: HOSPADM

## 2018-10-05 RX ORDER — AMIODARONE HYDROCHLORIDE 200 MG/1
200 TABLET ORAL DAILY
Status: DISCONTINUED | OUTPATIENT
Start: 2018-10-06 | End: 2018-10-08

## 2018-10-05 RX ORDER — OXYCODONE HYDROCHLORIDE 5 MG/1
10 TABLET ORAL EVERY 4 HOURS PRN
Status: DISCONTINUED | OUTPATIENT
Start: 2018-10-05 | End: 2018-10-08

## 2018-10-05 RX ORDER — MEPERIDINE HYDROCHLORIDE 25 MG/ML
12.5 INJECTION INTRAMUSCULAR; INTRAVENOUS; SUBCUTANEOUS
Status: DISCONTINUED | OUTPATIENT
Start: 2018-10-05 | End: 2018-10-05 | Stop reason: HOSPADM

## 2018-10-05 RX ORDER — THIAMINE MONONITRATE (VIT B1) 100 MG
100 TABLET ORAL DAILY
Status: DISCONTINUED | OUTPATIENT
Start: 2018-10-05 | End: 2018-10-08

## 2018-10-05 RX ORDER — SODIUM CHLORIDE, SODIUM LACTATE, POTASSIUM CHLORIDE, CALCIUM CHLORIDE 600; 310; 30; 20 MG/100ML; MG/100ML; MG/100ML; MG/100ML
125 INJECTION, SOLUTION INTRAVENOUS CONTINUOUS
Status: DISCONTINUED | OUTPATIENT
Start: 2018-10-05 | End: 2018-10-06

## 2018-10-05 RX ORDER — LISINOPRIL 2.5 MG/1
2.5 TABLET ORAL DAILY
Status: DISCONTINUED | OUTPATIENT
Start: 2018-10-06 | End: 2018-10-08

## 2018-10-05 RX ORDER — ACETAMINOPHEN 325 MG/1
650 TABLET ORAL EVERY 6 HOURS PRN
Status: DISCONTINUED | OUTPATIENT
Start: 2018-10-05 | End: 2018-10-06

## 2018-10-05 RX ORDER — ATORVASTATIN CALCIUM 40 MG/1
40 TABLET, FILM COATED ORAL
Status: DISCONTINUED | OUTPATIENT
Start: 2018-10-05 | End: 2018-10-08

## 2018-10-05 RX ORDER — HYDROMORPHONE HCL/PF 1 MG/ML
0.2 SYRINGE (ML) INJECTION
Status: DISCONTINUED | OUTPATIENT
Start: 2018-10-05 | End: 2018-10-05 | Stop reason: HOSPADM

## 2018-10-05 RX ORDER — ALBUMIN, HUMAN INJ 5% 5 %
SOLUTION INTRAVENOUS CONTINUOUS PRN
Status: DISCONTINUED | OUTPATIENT
Start: 2018-10-05 | End: 2018-10-05 | Stop reason: SURG

## 2018-10-05 RX ORDER — HYDROMORPHONE HCL/PF 1 MG/ML
0.4 SYRINGE (ML) INJECTION
Status: DISCONTINUED | OUTPATIENT
Start: 2018-10-05 | End: 2018-10-05

## 2018-10-05 RX ORDER — HYDROMORPHONE HCL/PF 1 MG/ML
1 SYRINGE (ML) INJECTION
Status: DISCONTINUED | OUTPATIENT
Start: 2018-10-05 | End: 2018-10-07

## 2018-10-05 RX ORDER — FENTANYL CITRATE/PF 50 MCG/ML
50 SYRINGE (ML) INJECTION
Status: DISCONTINUED | OUTPATIENT
Start: 2018-10-05 | End: 2018-10-05

## 2018-10-05 RX ORDER — GLYCOPYRROLATE 0.2 MG/ML
INJECTION INTRAMUSCULAR; INTRAVENOUS AS NEEDED
Status: DISCONTINUED | OUTPATIENT
Start: 2018-10-05 | End: 2018-10-05 | Stop reason: SURG

## 2018-10-05 RX ORDER — PROMETHAZINE HYDROCHLORIDE 25 MG/ML
12.5 INJECTION, SOLUTION INTRAMUSCULAR; INTRAVENOUS ONCE AS NEEDED
Status: DISCONTINUED | OUTPATIENT
Start: 2018-10-05 | End: 2018-10-05 | Stop reason: HOSPADM

## 2018-10-05 RX ADMIN — NEOSTIGMINE METHYLSULFATE 3 MG: 1 INJECTION, SOLUTION INTRAMUSCULAR; INTRAVENOUS; SUBCUTANEOUS at 15:28

## 2018-10-05 RX ADMIN — OXYCODONE HYDROCHLORIDE 10 MG: 5 TABLET ORAL at 22:39

## 2018-10-05 RX ADMIN — Medication 500 MG: at 13:55

## 2018-10-05 RX ADMIN — SODIUM CHLORIDE, SODIUM LACTATE, POTASSIUM CHLORIDE, AND CALCIUM CHLORIDE 125 ML/HR: .6; .31; .03; .02 INJECTION, SOLUTION INTRAVENOUS at 10:52

## 2018-10-05 RX ADMIN — FENTANYL CITRATE 50 MCG: 50 INJECTION INTRAMUSCULAR; INTRAVENOUS at 16:18

## 2018-10-05 RX ADMIN — KETOROLAC TROMETHAMINE 30 MG: 30 INJECTION, SOLUTION INTRAMUSCULAR at 15:47

## 2018-10-05 RX ADMIN — FENTANYL CITRATE 25 MCG: 50 INJECTION INTRAMUSCULAR; INTRAVENOUS at 16:33

## 2018-10-05 RX ADMIN — ROCURONIUM BROMIDE 50 MG: 10 INJECTION INTRAVENOUS at 13:43

## 2018-10-05 RX ADMIN — CEFAZOLIN SODIUM 1000 MG: 1 SOLUTION INTRAVENOUS at 13:54

## 2018-10-05 RX ADMIN — HYDROMORPHONE HYDROCHLORIDE 0.2 MG: 1 INJECTION, SOLUTION INTRAMUSCULAR; INTRAVENOUS; SUBCUTANEOUS at 16:58

## 2018-10-05 RX ADMIN — HYDROMORPHONE HYDROCHLORIDE 0.2 MG: 1 INJECTION, SOLUTION INTRAMUSCULAR; INTRAVENOUS; SUBCUTANEOUS at 17:13

## 2018-10-05 RX ADMIN — MIDAZOLAM 2 MG: 1 INJECTION INTRAMUSCULAR; INTRAVENOUS at 13:35

## 2018-10-05 RX ADMIN — FENTANYL CITRATE 50 MCG: 50 INJECTION INTRAMUSCULAR; INTRAVENOUS at 16:04

## 2018-10-05 RX ADMIN — SODIUM CHLORIDE 500 ML: 0.9 INJECTION, SOLUTION INTRAVENOUS at 22:45

## 2018-10-05 RX ADMIN — FENTANYL CITRATE 25 MCG: 50 INJECTION INTRAMUSCULAR; INTRAVENOUS at 16:23

## 2018-10-05 RX ADMIN — FENTANYL CITRATE 25 MCG: 50 INJECTION INTRAMUSCULAR; INTRAVENOUS at 16:38

## 2018-10-05 RX ADMIN — HEPARIN SODIUM 5000 UNITS: 5000 INJECTION, SOLUTION INTRAVENOUS; SUBCUTANEOUS at 16:46

## 2018-10-05 RX ADMIN — SODIUM CHLORIDE, SODIUM LACTATE, POTASSIUM CHLORIDE, AND CALCIUM CHLORIDE: .6; .31; .03; .02 INJECTION, SOLUTION INTRAVENOUS at 15:01

## 2018-10-05 RX ADMIN — HYDROMORPHONE HYDROCHLORIDE 1 MG: 1 INJECTION, SOLUTION INTRAMUSCULAR; INTRAVENOUS; SUBCUTANEOUS at 20:16

## 2018-10-05 RX ADMIN — FENTANYL CITRATE 100 MCG: 50 INJECTION, SOLUTION INTRAMUSCULAR; INTRAVENOUS at 13:42

## 2018-10-05 RX ADMIN — HYDROMORPHONE HYDROCHLORIDE 0.2 MG: 1 INJECTION, SOLUTION INTRAMUSCULAR; INTRAVENOUS; SUBCUTANEOUS at 16:48

## 2018-10-05 RX ADMIN — PROPOFOL 150 MG: 10 INJECTION, EMULSION INTRAVENOUS at 13:42

## 2018-10-05 RX ADMIN — SODIUM CHLORIDE 100 ML/HR: 0.9 INJECTION, SOLUTION INTRAVENOUS at 16:55

## 2018-10-05 RX ADMIN — GLYCOPYRROLATE 0.6 MG: 0.2 INJECTION, SOLUTION INTRAMUSCULAR; INTRAVENOUS at 15:28

## 2018-10-05 RX ADMIN — HEPARIN SODIUM 5000 UNITS: 5000 INJECTION, SOLUTION INTRAVENOUS; SUBCUTANEOUS at 22:18

## 2018-10-05 RX ADMIN — HYDROMORPHONE HYDROCHLORIDE 0.2 MG: 1 INJECTION, SOLUTION INTRAMUSCULAR; INTRAVENOUS; SUBCUTANEOUS at 16:43

## 2018-10-05 RX ADMIN — NICOTINE 1 PATCH: 7 PATCH, EXTENDED RELEASE TRANSDERMAL at 18:13

## 2018-10-05 RX ADMIN — FENTANYL CITRATE 25 MCG: 50 INJECTION INTRAMUSCULAR; INTRAVENOUS at 16:28

## 2018-10-05 RX ADMIN — HYDROMORPHONE HYDROCHLORIDE 0.2 MG: 1 INJECTION, SOLUTION INTRAMUSCULAR; INTRAVENOUS; SUBCUTANEOUS at 16:53

## 2018-10-05 RX ADMIN — GABAPENTIN 400 MG: 400 CAPSULE ORAL at 20:16

## 2018-10-05 RX ADMIN — DEXAMETHASONE SODIUM PHOSPHATE 4 MG: 10 INJECTION INTRAMUSCULAR; INTRAVENOUS at 14:09

## 2018-10-05 RX ADMIN — LIDOCAINE HYDROCHLORIDE 50 MG: 10 INJECTION, SOLUTION INFILTRATION; PERINEURAL at 13:42

## 2018-10-05 RX ADMIN — ALBUMIN (HUMAN): 12.5 SOLUTION INTRAVENOUS at 14:12

## 2018-10-05 RX ADMIN — OXYCODONE HYDROCHLORIDE 10 MG: 5 TABLET ORAL at 18:05

## 2018-10-05 NOTE — NURSING NOTE
Dr Sam Meckel notified of low bp  Pt denies dizziness, lightheadedness, chest pain  Skin warm and dry, pink but pale    Pt states he "feels well "

## 2018-10-05 NOTE — ANESTHESIA PREPROCEDURE EVALUATION
Review of Systems/Medical History  Patient summary reviewed  Chart reviewed  No history of anesthetic complications     Cardiovascular  Hyperlipidemia, Hypertension , Past MI , CAD , CAD status: 1VD, Cardiac stents  CHF ,    Pulmonary  COPD moderate- medication dependent , Asthma ,        GI/Hepatic  Negative GI/hepatic ROS   GERD well controlled, Bowel prep            Endo/Other  Diabetes well controlled Diet controlled,      GYN       Hematology  Anemia ,     Musculoskeletal       Neurology   Psychology           Physical Exam    Airway    Mallampati score: II  TM Distance: >3 FB  Neck ROM: full     Dental   No notable dental hx     Cardiovascular  Rhythm: regular, Rate: normal, Cardiovascular exam normal    Pulmonary  Pulmonary exam normal     Other Findings        Anesthesia Plan  ASA Score- 4     Anesthesia Type- IV sedation with anesthesia with ASA Monitors  Additional Monitors:   Airway Plan:     Comment: Discussed with pt the possibility under sedation to have recall or mild discomfort        Plan Factors-    Induction- intravenous  Postoperative Plan-     Informed Consent- Anesthetic plan and risks discussed with patient  I personally reviewed this patient with the CRNA  Discussed and agreed on the Anesthesia Plan with the CRNA  Adan Hollingsworth

## 2018-10-05 NOTE — RESPIRATORY THERAPY NOTE
RT Protocol Note  Noreen Cason 66 y o  male MRN: [id number]  Unit/Bed#: [facility] 045-95 Encounter: [id number]    Assessment    Principal Problem:    Ileostomy in place [facility])      Home Pulmonary Medications:  Albuterol Yudith Cambmel       Past Medical History:   Diagnosis Date    Anxiety     Asthma     Cardiac disease     Cervical stenosis (uterine cervix)     Chest pain     Chronic kidney disease     Colitis     Colon polyps     COPD (chronic obstructive pulmonary disease) (HCC)     2L @ HS and PRN    Coronary artery disease     Diverticulitis     Esophageal reflux     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HCC)     Hyperlipidemia     Hypertension     IBD (inflammatory bowel disease)     [facility] [address] [facility]     Old myocardial infarction     Perforation of colon (HCC)     Type 2 diabetes, diet controlled ([facility] [address] )     Ulcerative colitis ([facility] [address] )      Social History     Social History    Marital status:      Spouse name: N/A    Number of children: N/A    Years of education: N/A     Social History Main Topics    Smoking status: Current Every Day Smoker     Packs/day: 3 00     Years: 40 00     Types: Cigars    Smokeless tobacco: Never Used      Comment: 3 cigars per day    Alcohol use No      Comment: pt "quit about 4 months ago"    Drug use: No    Sexual activity: Yes     Partners: Female     Birth control/ protection: None     Other Topics Concern    None     Social History Narrative    None       Subjective         Objective    Physical Exam:   Assessment Type: Assess only  General Appearance: Alert, Awake  Respiratory Pattern: Normal  Chest Assessment: Chest expansion symmetrical  Bilateral Breath Sounds: Clear, Diminished    Vitals:  Blood pressure 108/65, pulse 88, temperature 97 7 °F (36 5 °C), temperature source Oral, resp  rate 12, height 5' 6" (1 676 m), weight 63 5 kg (140 lb), SpO2 96 %  Imaging and other studies: I have personally reviewed pertinent reports  Plan             Resp Comments: Pt had clear diminshed breath sounds during evaluation  Pt has hx of asthma and COPD, he takes Albuterol UDN prn at home  Will continue pt home regimin

## 2018-10-05 NOTE — PLAN OF CARE
Problem: Potential for Falls  Goal: Patient will remain free of falls  INTERVENTIONS:  - Assess patient frequently for physical needs  -  Identify cognitive and physical deficits and behaviors that affect risk of falls    -  [place] fall precautions as indicated by assessment   - Educate patient/family on patient safety including physical limitations  - Instruct patient to call for assistance with activity based on assessment  - Modify environment to reduce risk of injury  - Consider OT/PT consult to assist with strengthening/mobility   Outcome: Progressing

## 2018-10-05 NOTE — ANESTHESIA POSTPROCEDURE EVALUATION
Post-Op Assessment Note      CV Status:  Stable    Mental Status:  Alert and awake    Hydration Status:  Euvolemic    PONV Controlled:  Controlled    Airway Patency:  Patent  Airway: intubated    Post Op Vitals Reviewed: Yes          Staff: CRNA           /80 (10/05/18 1555)    Temp (!) 96 9 °F (36 1 °C) (10/05/18 1555)    Pulse 88 (10/05/18 1555)   Resp 15 (10/05/18 1555)    SpO2   97%

## 2018-10-06 LAB
ANION GAP SERPL CALCULATED.3IONS-SCNC: 7 MMOL/L (ref 4–13)
BASOPHILS # BLD AUTO: 0.01 THOUSANDS/ΜL (ref 0–0.1)
BASOPHILS NFR BLD AUTO: 0 % (ref 0–1)
BUN SERPL-MCNC: 25 MG/DL (ref 5–25)
CALCIUM SERPL-MCNC: 8.4 MG/DL (ref 8.3–10.1)
CHLORIDE SERPL-SCNC: 99 MMOL/L (ref 100–108)
CO2 SERPL-SCNC: 26 MMOL/L (ref 21–32)
CREAT SERPL-MCNC: 1.13 MG/DL (ref 0.6–1.3)
EOSINOPHIL # BLD AUTO: 0 THOUSAND/ΜL (ref 0–0.61)
EOSINOPHIL NFR BLD AUTO: 0 % (ref 0–6)
ERYTHROCYTE [DISTWIDTH] IN BLOOD BY AUTOMATED COUNT: 15.3 % (ref 11.6–15.1)
GFR SERPL CREATININE-BSD FRML MDRD: 72 ML/MIN/1.73SQ M
GLUCOSE SERPL-MCNC: 130 MG/DL (ref 65–140)
GLUCOSE SERPL-MCNC: 76 MG/DL (ref 65–140)
HCT VFR BLD AUTO: 27.9 % (ref 36.5–49.3)
HGB BLD-MCNC: 9.1 G/DL (ref 12–17)
IMM GRANULOCYTES # BLD AUTO: 0.11 THOUSAND/UL (ref 0–0.2)
IMM GRANULOCYTES NFR BLD AUTO: 1 % (ref 0–2)
LYMPHOCYTES # BLD AUTO: 0.67 THOUSANDS/ΜL (ref 0.6–4.47)
LYMPHOCYTES NFR BLD AUTO: 5 % (ref 14–44)
MAGNESIUM SERPL-MCNC: 1.2 MG/DL (ref 1.6–2.6)
MCH RBC QN AUTO: 26.6 PG (ref 26.8–34.3)
MCHC RBC AUTO-ENTMCNC: 32.6 G/DL (ref 31.4–37.4)
MCV RBC AUTO: 82 FL (ref 82–98)
MONOCYTES # BLD AUTO: 0.98 THOUSAND/ΜL (ref 0.17–1.22)
MONOCYTES NFR BLD AUTO: 7 % (ref 4–12)
NEUTROPHILS # BLD AUTO: 12.4 THOUSANDS/ΜL (ref 1.85–7.62)
NEUTS SEG NFR BLD AUTO: 87 % (ref 43–75)
NRBC BLD AUTO-RTO: 0 /100 WBCS
PLATELET # BLD AUTO: 220 THOUSANDS/UL (ref 149–390)
PMV BLD AUTO: 10.8 FL (ref 8.9–12.7)
POTASSIUM SERPL-SCNC: 4.1 MMOL/L (ref 3.5–5.3)
RBC # BLD AUTO: 3.42 MILLION/UL (ref 3.88–5.62)
SODIUM SERPL-SCNC: 132 MMOL/L (ref 136–145)
WBC # BLD AUTO: 14.17 THOUSAND/UL (ref 4.31–10.16)

## 2018-10-06 PROCEDURE — 85025 COMPLETE CBC W/AUTO DIFF WBC: CPT | Performed by: SURGERY

## 2018-10-06 PROCEDURE — 83735 ASSAY OF MAGNESIUM: CPT | Performed by: SURGERY

## 2018-10-06 PROCEDURE — 94640 AIRWAY INHALATION TREATMENT: CPT

## 2018-10-06 PROCEDURE — 80048 BASIC METABOLIC PNL TOTAL CA: CPT | Performed by: SURGERY

## 2018-10-06 PROCEDURE — 94760 N-INVAS EAR/PLS OXIMETRY 1: CPT

## 2018-10-06 RX ORDER — SODIUM CHLORIDE 9 MG/ML
75 INJECTION, SOLUTION INTRAVENOUS CONTINUOUS
Status: DISCONTINUED | OUTPATIENT
Start: 2018-10-06 | End: 2018-10-07

## 2018-10-06 RX ORDER — ACETAMINOPHEN 325 MG/1
975 TABLET ORAL EVERY 8 HOURS SCHEDULED
Status: DISCONTINUED | OUTPATIENT
Start: 2018-10-06 | End: 2018-10-08

## 2018-10-06 RX ORDER — MAGNESIUM SULFATE 1 G/100ML
1 INJECTION INTRAVENOUS ONCE
Status: COMPLETED | OUTPATIENT
Start: 2018-10-06 | End: 2018-10-06

## 2018-10-06 RX ORDER — BISACODYL 10 MG
10 SUPPOSITORY, RECTAL RECTAL DAILY
Status: DISCONTINUED | OUTPATIENT
Start: 2018-10-06 | End: 2018-10-15 | Stop reason: HOSPADM

## 2018-10-06 RX ADMIN — MESALAMINE 500 MG: 250 CAPSULE ORAL at 21:57

## 2018-10-06 RX ADMIN — ACETAMINOPHEN 975 MG: 325 TABLET, FILM COATED ORAL at 13:35

## 2018-10-06 RX ADMIN — GABAPENTIN 400 MG: 400 CAPSULE ORAL at 08:42

## 2018-10-06 RX ADMIN — OXYCODONE HYDROCHLORIDE 10 MG: 5 TABLET ORAL at 08:41

## 2018-10-06 RX ADMIN — HEPARIN SODIUM 5000 UNITS: 5000 INJECTION, SOLUTION INTRAVENOUS; SUBCUTANEOUS at 21:57

## 2018-10-06 RX ADMIN — ACETAMINOPHEN 975 MG: 325 TABLET, FILM COATED ORAL at 05:58

## 2018-10-06 RX ADMIN — HYDROMORPHONE HYDROCHLORIDE 1 MG: 1 INJECTION, SOLUTION INTRAMUSCULAR; INTRAVENOUS; SUBCUTANEOUS at 05:00

## 2018-10-06 RX ADMIN — Medication 30000 UNITS: at 08:46

## 2018-10-06 RX ADMIN — SODIUM CHLORIDE 75 ML/HR: 0.9 INJECTION, SOLUTION INTRAVENOUS at 06:00

## 2018-10-06 RX ADMIN — HYDROMORPHONE HYDROCHLORIDE 1 MG: 1 INJECTION, SOLUTION INTRAMUSCULAR; INTRAVENOUS; SUBCUTANEOUS at 01:05

## 2018-10-06 RX ADMIN — AMIODARONE HYDROCHLORIDE 200 MG: 200 TABLET ORAL at 08:41

## 2018-10-06 RX ADMIN — HEPARIN SODIUM 5000 UNITS: 5000 INJECTION, SOLUTION INTRAVENOUS; SUBCUTANEOUS at 05:00

## 2018-10-06 RX ADMIN — SODIUM CHLORIDE 100 ML/HR: 0.9 INJECTION, SOLUTION INTRAVENOUS at 03:04

## 2018-10-06 RX ADMIN — ASPIRIN 81 MG 81 MG: 81 TABLET ORAL at 08:42

## 2018-10-06 RX ADMIN — NICOTINE 1 PATCH: 7 PATCH, EXTENDED RELEASE TRANSDERMAL at 08:43

## 2018-10-06 RX ADMIN — HYDROMORPHONE HYDROCHLORIDE 1 MG: 1 INJECTION, SOLUTION INTRAMUSCULAR; INTRAVENOUS; SUBCUTANEOUS at 11:30

## 2018-10-06 RX ADMIN — HEPARIN SODIUM 5000 UNITS: 5000 INJECTION, SOLUTION INTRAVENOUS; SUBCUTANEOUS at 13:36

## 2018-10-06 RX ADMIN — GABAPENTIN 400 MG: 400 CAPSULE ORAL at 17:15

## 2018-10-06 RX ADMIN — GABAPENTIN 400 MG: 400 CAPSULE ORAL at 21:57

## 2018-10-06 RX ADMIN — OXYCODONE HYDROCHLORIDE 10 MG: 5 TABLET ORAL at 21:57

## 2018-10-06 RX ADMIN — HYDROMORPHONE HYDROCHLORIDE 1 MG: 1 INJECTION, SOLUTION INTRAMUSCULAR; INTRAVENOUS; SUBCUTANEOUS at 19:43

## 2018-10-06 RX ADMIN — OXYCODONE HYDROCHLORIDE 10 MG: 5 TABLET ORAL at 13:35

## 2018-10-06 RX ADMIN — OXYCODONE HYDROCHLORIDE 10 MG: 5 TABLET ORAL at 17:14

## 2018-10-06 RX ADMIN — MESALAMINE 500 MG: 250 CAPSULE ORAL at 17:16

## 2018-10-06 RX ADMIN — ACETAMINOPHEN 975 MG: 325 TABLET, FILM COATED ORAL at 21:57

## 2018-10-06 RX ADMIN — BISACODYL 10 MG: 10 SUPPOSITORY RECTAL at 13:36

## 2018-10-06 RX ADMIN — ALBUTEROL SULFATE 2.5 MG: 2.5 SOLUTION RESPIRATORY (INHALATION) at 07:28

## 2018-10-06 RX ADMIN — MAGNESIUM SULFATE HEPTAHYDRATE 1 G: 1 INJECTION, SOLUTION INTRAVENOUS at 11:26

## 2018-10-06 RX ADMIN — SODIUM CHLORIDE 75 ML/HR: 0.9 INJECTION, SOLUTION INTRAVENOUS at 13:35

## 2018-10-06 RX ADMIN — TIOTROPIUM BROMIDE 18 MCG: 18 CAPSULE ORAL; RESPIRATORY (INHALATION) at 11:27

## 2018-10-06 RX ADMIN — THIAMINE HCL TAB 100 MG 100 MG: 100 TAB at 08:42

## 2018-10-06 RX ADMIN — FOLIC ACID 1000 MCG: 1 TABLET ORAL at 08:41

## 2018-10-06 RX ADMIN — OXYCODONE HYDROCHLORIDE 10 MG: 5 TABLET ORAL at 02:53

## 2018-10-06 RX ADMIN — ATORVASTATIN CALCIUM 40 MG: 40 TABLET, FILM COATED ORAL at 17:15

## 2018-10-06 NOTE — PLAN OF CARE
DISCHARGE PLANNING     Discharge to home or other facility with appropriate resources Progressing        DISCHARGE PLANNING - CARE MANAGEMENT     Discharge to post-acute care or home with appropriate resources Progressing        GASTROINTESTINAL - ADULT     Maintains or returns to baseline bowel function Progressing     Maintains adequate nutritional intake Progressing        INFECTION - ADULT     Absence or prevention of progression during hospitalization Progressing        Knowledge Deficit     Patient/family/caregiver demonstrates understanding of disease process, treatment plan, medications, and discharge instructions Progressing        PAIN - ADULT     Verbalizes/displays adequate comfort level or baseline comfort level Progressing        Potential for Falls     Patient will remain free of falls Progressing        SAFETY ADULT     Maintain or return to baseline ADL function Progressing     Maintain or return mobility status to optimal level Progressing

## 2018-10-06 NOTE — PROGRESS NOTES
Pt requesting pain meds; resting in bed comfortably at this time, rates pain 9/10; states "the other nurse gave me the pills then the dilaudid   I would like the dilaudid"; instructed pt on hospital policy of narcotic orders; medicated with oral oxycodone at this time; pt also hesitant to walk, states "i was told I was bedrest"; instructed pt on the importance of oob and ambulating; pt has nursing student today and will ambulate patient

## 2018-10-06 NOTE — PROGRESS NOTES
Pt ordered for continuous pulse ox and ciwa score; pt stating he no longer drinks and has not for several months; room air sats 93%; stacy with surgery aware; will get back to me regarding pulse ox and ciwa

## 2018-10-06 NOTE — SOCIAL WORK
CM met with patient at bedside to explain CM role and discuss d/c plan  Pt resides with daughter Billy Hood [phone] in a 3 story home with no KAREN  Pt states that he is independent with ADLs  Pt uses RW to ambulate and has BC at home  Pt has O2 with supplies through [employer] [place]  Preferred Rx:  [facility]  PCP: Dr Jojo Meier  No history of STR  Pt is open with [facility] for PT  Pt requesting referral for resumption of care  Pt reports that he no longer drinks  No HOST referral needed  CM reviewed d/c planning process including the following: identifying help at home, patient preference for d/c planning needs, Discharge Lounge, [facility] Meds to Bed program, availability of treatment team to discuss questions or concerns patient and/or family may have regarding understanding medications and recognizing signs and symptoms once discharged  CM also encouraged patient to follow up with all recommended appointments after discharge  Patient advised of importance for patient and family to participate in managing patients medical well being

## 2018-10-06 NOTE — CASE MANAGEMENT
Initial Clinical Review    Thank you,  [address]  Utilization Review Department  Phone: [phone]; Fax [phone]  ATTENTION: Please call with any questions or concerns to [phone]  and carefully follow the prompts so that you are directed to the right person  Send all requests for admission clinical reviews, approved or denied determinations and any other requests to fax [phone]  All voicemails are confidential      Age/Sex: 66 y o  male    Surgery Date: 10/5/2018    Procedure: CLOSURE ILEOSTOMY (N/A)    Anesthesia: General    Operative Indications:  Encounter for attention to ileostomy ([address] ) [Z43 2]     Operative Findings:  Ileostomy intact  Significant adhesions in RLQ  Small bowel resection with side-to-side anastomosis     Complications:   None       Admission Orders: Date/Time/Statement: 10/5/18 @ 1553     Orders Placed This Encounter   Procedures    Inpatient Admission     Standing Status:   Standing     Number of Occurrences:   1     Order Specific Question:   Admitting Physician     Answer:   Jaron Enriquez     Order Specific Question:   Level of Care     Answer:   Med Surg [16]     Order Specific Question:   Bed request comments     Answer:   Please place patient on p8  Thanks  Order Specific Question:   Estimated length of stay     Answer:   More than 2 Midnights     Order Specific Question:   Certification     Answer:   I certify that inpatient services are medically necessary for this patient for a duration of greater than two midnights  See H&P and MD Progress Notes for additional information about the patient's course of treatment         Vital Signs: /60 (BP Location: Right arm)   Pulse 88   Temp 97 9 °F (36 6 °C) (Oral)   Resp 18   Ht 5' 6" (1 676 m)   Wt 63 5 kg (140 lb)   SpO2 95%   BMI 22 60 kg/m²     Diet: Full liquid diet    Nursing orders:  Continuous Pox, IS q 1h while awake, CIWA-Ar protocol, monitor I&O's, B/L venodynes Scheduled Meds:  Current Facility-Administered Medications:  acetaminophen 975 mg Oral Q8H [facility] Tegan Zhao    albuterol 2 5 mg Nebulization Q4H PRN Verenice Kilo    amiodarone 200 mg Oral Daily Verenice Kilo    aspirin 81 mg Oral Daily Tegan Zhao    atorvastatin 40 mg Oral Daily With [facility] Cece    bisacodyl 10 mg Rectal Daily Feliz Acuna MD    folic acid 8,277 mcg Oral Daily Juan Barneveld Cece    gabapentin 400 mg Oral TID Juan Barneveld Cece    heparin (porcine) 5,000 Units Subcutaneous Q8H [facility] Tegan Zhao    HYDROmorphone 1 mg Intravenous Q3H PRN Juan Barneveld Cece    lisinopril 2 5 mg Oral Daily Juan Barneveld Cece    mesalamine 500 mg Oral TID Verenice Kilo    nicotine 1 patch Transdermal Daily Tegan Zhao    ondansetron 4 mg Intravenous Q6H PRN Verenice Kilo    oxyCODONE 10 mg Oral Q4H PRN Verenice Kilo    oxyCODONE 5 mg Oral Q4H PRN Juan Barneveld Cece    sodium chloride 75 mL/hr Intravenous Continuous Verenice Kilo Last Rate: 75 mL/hr (10/06/18 1335)   thiamine 100 mg Oral Daily Tegan Zhao    tiotropium 18 mcg Inhalation Daily Tegan hZao    vitamin A 30,000 Units Oral Daily Tegan Zhao      Continuous Infusions:  sodium chloride 75 mL/hr Last Rate: 75 mL/hr (10/06/18 1335)     PRN Meds: albuterol    HYDROmorphone 1 mg IV 10/5, x1, 10/6 x3    ondansetron    oxyCODONE 10 mg po 10/5 x2, 10/6 x3

## 2018-10-06 NOTE — PLAN OF CARE
DISCHARGE PLANNING     Discharge to home or other facility with appropriate resources Progressing        GASTROINTESTINAL - ADULT     Maintains or returns to baseline bowel function Progressing     Maintains adequate nutritional intake Progressing        INFECTION - ADULT     Absence or prevention of progression during hospitalization Progressing        Knowledge Deficit     Patient/family/caregiver demonstrates understanding of disease process, treatment plan, medications, and discharge instructions Progressing        PAIN - ADULT     Verbalizes/displays adequate comfort level or baseline comfort level Progressing        Potential for Falls     Patient will remain free of falls Progressing        SAFETY ADULT     Maintain or return to baseline ADL function Progressing     Maintain or return mobility status to optimal level Progressing

## 2018-10-06 NOTE — PROGRESS NOTES
Into medicate pt; pt sleeping when entering room, when awakened, pt rates pain 8/10; will give oxycodone

## 2018-10-06 NOTE — ASSESSMENT & PLAN NOTE
S/p open ileostomy reversal with small bowel resection 10/5  Advance diet to regular  Continue to monitor UO

## 2018-10-06 NOTE — PROGRESS NOTES
Progress Note - Colorectal Surgery   Isaac Lat 66 y o  male MRN: [id number]  Unit/Bed#: [facility] 815-01 Encounter: [id number]    Assessment/Plan:  66 y o  male with hx ileostomy     * Ileostomy in place [facility])   Assessment & Plan    S/p open ileostomy reversal with small bowel resection 10/5  Clears, add toast today  Advance diet as tolerated  OOB, ambulate  Analgesia -- tylenol ATC         Subjective/Objective     Subjective: Patient complaining of pain  No flatus or BM  Wants to eat  Objective:     Vitals: Blood pressure 102/58, pulse 93, temperature 97 7 °F (36 5 °C), temperature source Oral, resp  rate 20, height 5' 6" (1 676 m), weight 63 5 kg (140 lb), SpO2 91 %  ,Body mass index is 22 6 kg/m²  I/O       10/04 0701 - 10/05 0700 10/05 0701 - 10/06 0700    I V  (mL/kg)  2515 (39 6)    IV Piggyback  750    Total Intake(mL/kg)  3265 (51 4)    Urine (mL/kg/hr)  2310    Blood  50    Total Output   2360    Net   +905                Physical Exam:  GEN: NAD  HEENT: MMM  CV: RRR  Lung: Normal effort  Ab: Soft, NT/ND, incision C/D/I  Extrem: No CCE  Neuro:  A+Ox3    Lab, Imaging and other studies:   CBC with diff:   Lab Results   Component Value Date    WBC 14 17 (H) 10/06/2018    HGB 9 1 (L) 10/06/2018    HCT 27 9 (L) 10/06/2018    MCV 82 10/06/2018     10/06/2018    MCH 26 6 (L) 10/06/2018    MCHC 32 6 10/06/2018    RDW 15 3 (H) 10/06/2018    MPV 10 8 10/06/2018    NRBC 0 10/06/2018   , BMP/CMP:   Lab Results   Component Value Date     (L) 10/06/2018    K 4 1 10/06/2018    CL 99 (L) 10/06/2018    CO2 26 10/06/2018    BUN 25 10/06/2018    CREATININE 1 13 10/06/2018    CALCIUM 8 4 10/06/2018    EGFR 72 10/06/2018   , Magnesium: No results found for: MAG  VTE Pharmacologic Prophylaxis: Heparin  VTE Mechanical Prophylaxis: sequential compression device

## 2018-10-07 LAB
ANION GAP SERPL CALCULATED.3IONS-SCNC: 8 MMOL/L (ref 4–13)
BASOPHILS # BLD AUTO: 0.03 THOUSANDS/ΜL (ref 0–0.1)
BASOPHILS NFR BLD AUTO: 0 % (ref 0–1)
BUN SERPL-MCNC: 10 MG/DL (ref 5–25)
CALCIUM SERPL-MCNC: 8.7 MG/DL (ref 8.3–10.1)
CHLORIDE SERPL-SCNC: 100 MMOL/L (ref 100–108)
CO2 SERPL-SCNC: 25 MMOL/L (ref 21–32)
CREAT SERPL-MCNC: 0.88 MG/DL (ref 0.6–1.3)
EOSINOPHIL # BLD AUTO: 0.13 THOUSAND/ΜL (ref 0–0.61)
EOSINOPHIL NFR BLD AUTO: 1 % (ref 0–6)
ERYTHROCYTE [DISTWIDTH] IN BLOOD BY AUTOMATED COUNT: 15.8 % (ref 11.6–15.1)
GFR SERPL CREATININE-BSD FRML MDRD: 96 ML/MIN/1.73SQ M
GLUCOSE SERPL-MCNC: 111 MG/DL (ref 65–140)
HCT VFR BLD AUTO: 34.9 % (ref 36.5–49.3)
HGB BLD-MCNC: 10.8 G/DL (ref 12–17)
IMM GRANULOCYTES # BLD AUTO: 0.03 THOUSAND/UL (ref 0–0.2)
IMM GRANULOCYTES NFR BLD AUTO: 0 % (ref 0–2)
LYMPHOCYTES # BLD AUTO: 1.97 THOUSANDS/ΜL (ref 0.6–4.47)
LYMPHOCYTES NFR BLD AUTO: 15 % (ref 14–44)
MAGNESIUM SERPL-MCNC: 1.6 MG/DL (ref 1.6–2.6)
MCH RBC QN AUTO: 26.5 PG (ref 26.8–34.3)
MCHC RBC AUTO-ENTMCNC: 30.9 G/DL (ref 31.4–37.4)
MCV RBC AUTO: 86 FL (ref 82–98)
MONOCYTES # BLD AUTO: 1.1 THOUSAND/ΜL (ref 0.17–1.22)
MONOCYTES NFR BLD AUTO: 8 % (ref 4–12)
NEUTROPHILS # BLD AUTO: 9.87 THOUSANDS/ΜL (ref 1.85–7.62)
NEUTS SEG NFR BLD AUTO: 76 % (ref 43–75)
NRBC BLD AUTO-RTO: 0 /100 WBCS
PLATELET # BLD AUTO: 256 THOUSANDS/UL (ref 149–390)
PMV BLD AUTO: 10.6 FL (ref 8.9–12.7)
POTASSIUM SERPL-SCNC: 3.7 MMOL/L (ref 3.5–5.3)
RBC # BLD AUTO: 4.08 MILLION/UL (ref 3.88–5.62)
SODIUM SERPL-SCNC: 133 MMOL/L (ref 136–145)
WBC # BLD AUTO: 13.13 THOUSAND/UL (ref 4.31–10.16)

## 2018-10-07 PROCEDURE — 80048 BASIC METABOLIC PNL TOTAL CA: CPT | Performed by: SURGERY

## 2018-10-07 PROCEDURE — 85025 COMPLETE CBC W/AUTO DIFF WBC: CPT | Performed by: SURGERY

## 2018-10-07 PROCEDURE — 90682 RIV4 VACC RECOMBINANT DNA IM: CPT | Performed by: SURGERY

## 2018-10-07 PROCEDURE — 94760 N-INVAS EAR/PLS OXIMETRY 1: CPT

## 2018-10-07 PROCEDURE — 83735 ASSAY OF MAGNESIUM: CPT | Performed by: SURGERY

## 2018-10-07 PROCEDURE — 94640 AIRWAY INHALATION TREATMENT: CPT

## 2018-10-07 RX ORDER — MAGNESIUM SULFATE HEPTAHYDRATE 40 MG/ML
2 INJECTION, SOLUTION INTRAVENOUS ONCE
Status: COMPLETED | OUTPATIENT
Start: 2018-10-07 | End: 2018-10-07

## 2018-10-07 RX ADMIN — ALBUTEROL SULFATE 2.5 MG: 2.5 SOLUTION RESPIRATORY (INHALATION) at 08:34

## 2018-10-07 RX ADMIN — OXYCODONE HYDROCHLORIDE 10 MG: 5 TABLET ORAL at 20:32

## 2018-10-07 RX ADMIN — HEPARIN SODIUM 5000 UNITS: 5000 INJECTION, SOLUTION INTRAVENOUS; SUBCUTANEOUS at 22:52

## 2018-10-07 RX ADMIN — HYDROMORPHONE HYDROCHLORIDE 1 MG: 1 INJECTION, SOLUTION INTRAMUSCULAR; INTRAVENOUS; SUBCUTANEOUS at 08:55

## 2018-10-07 RX ADMIN — LISINOPRIL 2.5 MG: 2.5 TABLET ORAL at 08:54

## 2018-10-07 RX ADMIN — INFLUENZA A VIRUS A/MICHIGAN/45/2015 (H1N1) RECOMBINANT HEMAGGLUTININ ANTIGEN, INFLUENZA A VIRUS A/SINGAPORE/INFIMH-16-0019/2016 (H3N2) RECOMBINANT HEMAGGLUTININ ANTIGEN, INFLUENZA B VIRUS B/MARYLAND/15/2016 RECOMBINANT HEMAGGLUTININ ANTIGEN, AND INFLUENZA B VIRUS B/PHUKET/3073/2013 RECOMBINANT HEMAGGLUTININ ANTIGEN 0.5 ML: 45; 45; 45; 45 INJECTION INTRAMUSCULAR at 06:47

## 2018-10-07 RX ADMIN — NICOTINE 1 PATCH: 7 PATCH, EXTENDED RELEASE TRANSDERMAL at 08:54

## 2018-10-07 RX ADMIN — OXYCODONE HYDROCHLORIDE 10 MG: 5 TABLET ORAL at 11:32

## 2018-10-07 RX ADMIN — TIOTROPIUM BROMIDE 18 MCG: 18 CAPSULE ORAL; RESPIRATORY (INHALATION) at 08:56

## 2018-10-07 RX ADMIN — FOLIC ACID 1000 MCG: 1 TABLET ORAL at 08:54

## 2018-10-07 RX ADMIN — BISACODYL 10 MG: 10 SUPPOSITORY RECTAL at 08:54

## 2018-10-07 RX ADMIN — HEPARIN SODIUM 5000 UNITS: 5000 INJECTION, SOLUTION INTRAVENOUS; SUBCUTANEOUS at 13:34

## 2018-10-07 RX ADMIN — ACETAMINOPHEN 975 MG: 325 TABLET, FILM COATED ORAL at 06:44

## 2018-10-07 RX ADMIN — MESALAMINE 500 MG: 250 CAPSULE ORAL at 16:11

## 2018-10-07 RX ADMIN — GABAPENTIN 400 MG: 400 CAPSULE ORAL at 16:11

## 2018-10-07 RX ADMIN — MESALAMINE 500 MG: 250 CAPSULE ORAL at 08:55

## 2018-10-07 RX ADMIN — GABAPENTIN 400 MG: 400 CAPSULE ORAL at 08:54

## 2018-10-07 RX ADMIN — OXYCODONE HYDROCHLORIDE 10 MG: 5 TABLET ORAL at 06:44

## 2018-10-07 RX ADMIN — AMIODARONE HYDROCHLORIDE 200 MG: 200 TABLET ORAL at 08:54

## 2018-10-07 RX ADMIN — THIAMINE HCL TAB 100 MG 100 MG: 100 TAB at 08:54

## 2018-10-07 RX ADMIN — OXYCODONE HYDROCHLORIDE 10 MG: 5 TABLET ORAL at 16:12

## 2018-10-07 RX ADMIN — ONDANSETRON 4 MG: 2 INJECTION INTRAMUSCULAR; INTRAVENOUS at 16:16

## 2018-10-07 RX ADMIN — Medication 30000 UNITS: at 08:55

## 2018-10-07 RX ADMIN — ONDANSETRON 4 MG: 2 INJECTION INTRAMUSCULAR; INTRAVENOUS at 22:55

## 2018-10-07 RX ADMIN — ATORVASTATIN CALCIUM 40 MG: 40 TABLET, FILM COATED ORAL at 16:11

## 2018-10-07 RX ADMIN — ACETAMINOPHEN 975 MG: 325 TABLET, FILM COATED ORAL at 13:33

## 2018-10-07 RX ADMIN — HYDROMORPHONE HYDROCHLORIDE 1 MG: 1 INJECTION, SOLUTION INTRAMUSCULAR; INTRAVENOUS; SUBCUTANEOUS at 01:37

## 2018-10-07 RX ADMIN — MAGNESIUM SULFATE HEPTAHYDRATE 2 G: 40 INJECTION, SOLUTION INTRAVENOUS at 08:54

## 2018-10-07 RX ADMIN — HEPARIN SODIUM 5000 UNITS: 5000 INJECTION, SOLUTION INTRAVENOUS; SUBCUTANEOUS at 06:44

## 2018-10-07 RX ADMIN — SODIUM CHLORIDE 75 ML/HR: 0.9 INJECTION, SOLUTION INTRAVENOUS at 04:02

## 2018-10-07 RX ADMIN — ASPIRIN 81 MG 81 MG: 81 TABLET ORAL at 08:54

## 2018-10-07 NOTE — PROGRESS NOTES
Notified by pca, room air sat 86%; pt resting in bed; no shortness of breath noted; pt placed on 3L with sats of 95%; pt re educated on incentive spirometry, however refusing; will continue to monitor

## 2018-10-07 NOTE — PROGRESS NOTES
Pt requesting iv dilaudid; states "you never gave me the iv dilaudid when you were in before"; instructed patient he did receive iv dilaudid earlier as per request; also instructed patient his iv dilaudid was discontinued and he is on oxycodone as needed for pain and tylenol around the clock for pain control; pt resting comfortably in bed, talking with family, rating pain 8/10; prn oxycodone given;  pt also stating his insurance will no longer pay for oxycodone when he goes home; pt states "i can get it cheap off the street when I get home from [facility]";

## 2018-10-07 NOTE — PROGRESS NOTES
Pt requesting iv dilaudid; re instructed patient iv dilaudid has been discontinued; pt states "i don't know why they took it away   I will need it until I go home"; pt re educated use of oral meds and instructed he can have his oxycodone at  today; pt given his scheduled dose of tylenol; pt states "just bring in the med when it is due"; instructed patient he will be reassessed at 1530 for pain med administration

## 2018-10-07 NOTE — PROGRESS NOTES
Pt requesting iv dilaudid after the oxycodone is administered; re educated patient on narcotic use; dilaudid should be used for breakthrough pain only if needed and not around the clock; oxycodone should be administered first; pt still requesting iv meds

## 2018-10-07 NOTE — PROGRESS NOTES
Progress Note - Colorectal Surgery   Jocelyn Wilson 66 y o  male MRN: [id number]  Unit/Bed#: [facility] 815-01 Encounter: [id number]    Assessment:  67 y/o M w/ hx of perforated transverse colon d/t ischemia, ex-lap, ileocolonic resection w/ primary anastomosis, diverting ileostomy, s/p ileostomy reversal, POD #2    Plan:  --Full liquid diet; ADAT  --NS @75  --OOB, ambulate  --Pain control  --SQH/SCDs    Subjective/Objective      Subjective:     No acute events overnight  Pt denies any complaints this AM  Admits he has been sneaking solid foods and has been tolerating them well  Denies any abdominal pain, N/V/D  Objective:     Blood pressure 116/67, pulse 93, temperature 98 2 °F (36 8 °C), temperature source Oral, resp  rate 18, height 5' 6" (1 676 m), weight 63 5 kg (140 lb), SpO2 91 %  ,Body mass index is 22 6 kg/m²  I/O       10/05 0701 - 10/06 0700 10/06 0701 - 10/07 0700    P  O   720    I V  (mL/kg) 2515 (39 6) 1650 (26)    IV Piggyback 750 100    Total Intake(mL/kg) 3265 (51 4) 2470 (38 9)    Urine (mL/kg/hr) 2460 1500 (1)    Blood 50     Total Output 2510 1500    Net +755 +970                  Invasive Devices     Peripheral Intravenous Line            Peripheral IV 10/05/18 Right Wrist 2 days    Peripheral IV 10/05/18 Left Antecubital 1 day                Physical Exam:     GEN: NAD  HEENT: MMM  CV: RRR  Lung: normal effort  Ab: Soft, +LLQ distension, + incisional tenderness, incision c/d/i  Extrem: No CCE  Neuro:  A+Ox3, motor and sensation grossly intact      Lab, Imaging and other studies:  CBC:   Lab Results   Component Value Date    WBC 13 13 (H) 10/07/2018    HGB 10 8 (L) 10/07/2018    HCT 34 9 (L) 10/07/2018    MCV 86 10/07/2018     10/07/2018    MCH 26 5 (L) 10/07/2018    MCHC 30 9 (L) 10/07/2018    RDW 15 8 (H) 10/07/2018    MPV 10 6 10/07/2018    NRBC 0 10/07/2018   , CMP:   Lab Results   Component Value Date     (L) 10/07/2018    K 3 7 10/07/2018     10/07/2018    CO2 25 10/07/2018    BUN 10 10/07/2018    CREATININE 0 88 10/07/2018    CALCIUM 8 7 10/07/2018    EGFR 96 10/07/2018   , Coagulation: No results found for: PT, INR, APTT, Urinalysis: No results found for: COLORU, CLARITYU, SPECGRAV, PHUR, LEUKOCYTESUR, NITRITE, PROTEINUA, GLUCOSEU, KETONESU, BILIRUBINUR, BLOODU, Amylase: No results found for: AMYLASE, Lipase: No results found for: LIPASE  VTE Pharmacologic Prophylaxis: Heparin  VTE Mechanical Prophylaxis: sequential compression device

## 2018-10-07 NOTE — PROGRESS NOTES
In to administer scheduled meds; when entering room, pt sleeping, when awakened, pt asking for pain meds, rating pain an 8/10; pt also stating he has nausea; oxycodone and zofran given

## 2018-10-07 NOTE — PROGRESS NOTES
In to administer am meds; pt states "my iv is coming, right?"; pt resting comfortably in bed at this time; rates pain 9; iv dilaudid given as per pt request

## 2018-10-08 LAB
ANION GAP SERPL CALCULATED.3IONS-SCNC: 4 MMOL/L (ref 4–13)
BASOPHILS # BLD AUTO: 0.03 THOUSANDS/ΜL (ref 0–0.1)
BASOPHILS NFR BLD AUTO: 0 % (ref 0–1)
BUN SERPL-MCNC: 10 MG/DL (ref 5–25)
CALCIUM SERPL-MCNC: 8.8 MG/DL (ref 8.3–10.1)
CHLORIDE SERPL-SCNC: 97 MMOL/L (ref 100–108)
CO2 SERPL-SCNC: 34 MMOL/L (ref 21–32)
CREAT SERPL-MCNC: 0.98 MG/DL (ref 0.6–1.3)
EOSINOPHIL # BLD AUTO: 0 THOUSAND/ΜL (ref 0–0.61)
EOSINOPHIL NFR BLD AUTO: 0 % (ref 0–6)
ERYTHROCYTE [DISTWIDTH] IN BLOOD BY AUTOMATED COUNT: 15.9 % (ref 11.6–15.1)
GFR SERPL CREATININE-BSD FRML MDRD: 86 ML/MIN/1.73SQ M
GLUCOSE SERPL-MCNC: 138 MG/DL (ref 65–140)
HCT VFR BLD AUTO: 30 % (ref 36.5–49.3)
HGB BLD-MCNC: 9.7 G/DL (ref 12–17)
IMM GRANULOCYTES # BLD AUTO: 0.05 THOUSAND/UL (ref 0–0.2)
IMM GRANULOCYTES NFR BLD AUTO: 0 % (ref 0–2)
LYMPHOCYTES # BLD AUTO: 0.94 THOUSANDS/ΜL (ref 0.6–4.47)
LYMPHOCYTES NFR BLD AUTO: 7 % (ref 14–44)
MAGNESIUM SERPL-MCNC: 2.2 MG/DL (ref 1.6–2.6)
MCH RBC QN AUTO: 27.1 PG (ref 26.8–34.3)
MCHC RBC AUTO-ENTMCNC: 32.3 G/DL (ref 31.4–37.4)
MCV RBC AUTO: 84 FL (ref 82–98)
MONOCYTES # BLD AUTO: 1.03 THOUSAND/ΜL (ref 0.17–1.22)
MONOCYTES NFR BLD AUTO: 7 % (ref 4–12)
NEUTROPHILS # BLD AUTO: 11.88 THOUSANDS/ΜL (ref 1.85–7.62)
NEUTS SEG NFR BLD AUTO: 86 % (ref 43–75)
NRBC BLD AUTO-RTO: 0 /100 WBCS
PLATELET # BLD AUTO: 267 THOUSANDS/UL (ref 149–390)
PMV BLD AUTO: 11 FL (ref 8.9–12.7)
POTASSIUM SERPL-SCNC: 3.6 MMOL/L (ref 3.5–5.3)
RBC # BLD AUTO: 3.58 MILLION/UL (ref 3.88–5.62)
SODIUM SERPL-SCNC: 135 MMOL/L (ref 136–145)
WBC # BLD AUTO: 13.93 THOUSAND/UL (ref 4.31–10.16)

## 2018-10-08 PROCEDURE — 83735 ASSAY OF MAGNESIUM: CPT | Performed by: SURGERY

## 2018-10-08 PROCEDURE — 80048 BASIC METABOLIC PNL TOTAL CA: CPT | Performed by: SURGERY

## 2018-10-08 PROCEDURE — 85025 COMPLETE CBC W/AUTO DIFF WBC: CPT | Performed by: SURGERY

## 2018-10-08 RX ORDER — SODIUM CHLORIDE, SODIUM LACTATE, POTASSIUM CHLORIDE, CALCIUM CHLORIDE 600; 310; 30; 20 MG/100ML; MG/100ML; MG/100ML; MG/100ML
125 INJECTION, SOLUTION INTRAVENOUS CONTINUOUS
Status: DISCONTINUED | OUTPATIENT
Start: 2018-10-08 | End: 2018-10-09

## 2018-10-08 RX ORDER — FOLIC ACID 1 MG/1
1000 TABLET ORAL DAILY
Status: DISCONTINUED | OUTPATIENT
Start: 2018-10-09 | End: 2018-10-09

## 2018-10-08 RX ORDER — POTASSIUM CHLORIDE 14.9 MG/ML
20 INJECTION INTRAVENOUS
Status: COMPLETED | OUTPATIENT
Start: 2018-10-08 | End: 2018-10-08

## 2018-10-08 RX ORDER — DEXTROSE, SODIUM CHLORIDE, AND POTASSIUM CHLORIDE 5; .45; .15 G/100ML; G/100ML; G/100ML
84 INJECTION INTRAVENOUS CONTINUOUS
Status: DISCONTINUED | OUTPATIENT
Start: 2018-10-08 | End: 2018-10-08

## 2018-10-08 RX ORDER — HYDROMORPHONE HCL/PF 1 MG/ML
0.5 SYRINGE (ML) INJECTION EVERY 2 HOUR PRN
Status: DISCONTINUED | OUTPATIENT
Start: 2018-10-08 | End: 2018-10-09

## 2018-10-08 RX ORDER — LIDOCAINE HYDROCHLORIDE 20 MG/ML
JELLY TOPICAL ONCE
Status: COMPLETED | OUTPATIENT
Start: 2018-10-08 | End: 2018-10-08

## 2018-10-08 RX ORDER — METOCLOPRAMIDE HYDROCHLORIDE 5 MG/ML
10 INJECTION INTRAMUSCULAR; INTRAVENOUS EVERY 6 HOURS SCHEDULED
Status: DISPENSED | OUTPATIENT
Start: 2018-10-08 | End: 2018-10-13

## 2018-10-08 RX ORDER — LORAZEPAM 2 MG/ML
0.5 INJECTION INTRAMUSCULAR ONCE
Status: COMPLETED | OUTPATIENT
Start: 2018-10-08 | End: 2018-10-08

## 2018-10-08 RX ORDER — DEXTROSE, SODIUM CHLORIDE, AND POTASSIUM CHLORIDE 5; .45; .15 G/100ML; G/100ML; G/100ML
75 INJECTION INTRAVENOUS CONTINUOUS
Status: DISCONTINUED | OUTPATIENT
Start: 2018-10-08 | End: 2018-10-08

## 2018-10-08 RX ORDER — HYDROMORPHONE HCL/PF 1 MG/ML
1 SYRINGE (ML) INJECTION ONCE
Status: COMPLETED | OUTPATIENT
Start: 2018-10-08 | End: 2018-10-08

## 2018-10-08 RX ORDER — AMIODARONE HYDROCHLORIDE 200 MG/1
200 TABLET ORAL DAILY
Status: DISCONTINUED | OUTPATIENT
Start: 2018-10-09 | End: 2018-10-09

## 2018-10-08 RX ORDER — HYDROMORPHONE HCL/PF 1 MG/ML
0.5 SYRINGE (ML) INJECTION
Status: DISCONTINUED | OUTPATIENT
Start: 2018-10-08 | End: 2018-10-08

## 2018-10-08 RX ORDER — GABAPENTIN 400 MG/1
400 CAPSULE ORAL 3 TIMES DAILY
Status: DISCONTINUED | OUTPATIENT
Start: 2018-10-08 | End: 2018-10-15 | Stop reason: HOSPADM

## 2018-10-08 RX ADMIN — HYDROMORPHONE HYDROCHLORIDE 0.5 MG: 1 INJECTION, SOLUTION INTRAMUSCULAR; INTRAVENOUS; SUBCUTANEOUS at 21:54

## 2018-10-08 RX ADMIN — LIDOCAINE HYDROCHLORIDE: 20 JELLY TOPICAL at 11:09

## 2018-10-08 RX ADMIN — NICOTINE 1 PATCH: 7 PATCH, EXTENDED RELEASE TRANSDERMAL at 08:48

## 2018-10-08 RX ADMIN — HEPARIN SODIUM 5000 UNITS: 5000 INJECTION, SOLUTION INTRAVENOUS; SUBCUTANEOUS at 14:53

## 2018-10-08 RX ADMIN — GABAPENTIN 400 MG: 400 CAPSULE ORAL at 17:35

## 2018-10-08 RX ADMIN — HEPARIN SODIUM 5000 UNITS: 5000 INJECTION, SOLUTION INTRAVENOUS; SUBCUTANEOUS at 06:30

## 2018-10-08 RX ADMIN — POTASSIUM CHLORIDE 20 MEQ: 200 INJECTION, SOLUTION INTRAVENOUS at 11:27

## 2018-10-08 RX ADMIN — GABAPENTIN 400 MG: 400 CAPSULE ORAL at 08:46

## 2018-10-08 RX ADMIN — HYDROMORPHONE HYDROCHLORIDE 0.5 MG: 1 INJECTION, SOLUTION INTRAMUSCULAR; INTRAVENOUS; SUBCUTANEOUS at 07:39

## 2018-10-08 RX ADMIN — HYDROMORPHONE HYDROCHLORIDE 1 MG: 1 INJECTION, SOLUTION INTRAMUSCULAR; INTRAVENOUS; SUBCUTANEOUS at 10:55

## 2018-10-08 RX ADMIN — HYDROMORPHONE HYDROCHLORIDE 0.5 MG: 1 INJECTION, SOLUTION INTRAMUSCULAR; INTRAVENOUS; SUBCUTANEOUS at 12:29

## 2018-10-08 RX ADMIN — TIOTROPIUM BROMIDE 18 MCG: 18 CAPSULE ORAL; RESPIRATORY (INHALATION) at 08:47

## 2018-10-08 RX ADMIN — DEXTROSE, SODIUM CHLORIDE, AND POTASSIUM CHLORIDE 75 ML/HR: 5; .45; .15 INJECTION INTRAVENOUS at 02:06

## 2018-10-08 RX ADMIN — OXYCODONE HYDROCHLORIDE 10 MG: 5 TABLET ORAL at 06:29

## 2018-10-08 RX ADMIN — METOCLOPRAMIDE 10 MG: 5 INJECTION, SOLUTION INTRAMUSCULAR; INTRAVENOUS at 17:35

## 2018-10-08 RX ADMIN — ASPIRIN 81 MG 81 MG: 81 TABLET ORAL at 08:46

## 2018-10-08 RX ADMIN — FOLIC ACID 1000 MCG: 1 TABLET ORAL at 08:46

## 2018-10-08 RX ADMIN — LORAZEPAM 0.5 MG: 2 INJECTION INTRAMUSCULAR; INTRAVENOUS at 11:56

## 2018-10-08 RX ADMIN — DEXTROSE, SODIUM CHLORIDE, AND POTASSIUM CHLORIDE 84 ML/HR: 5; .45; .15 INJECTION INTRAVENOUS at 09:35

## 2018-10-08 RX ADMIN — AMIODARONE HYDROCHLORIDE 200 MG: 200 TABLET ORAL at 08:47

## 2018-10-08 RX ADMIN — METOCLOPRAMIDE 10 MG: 5 INJECTION, SOLUTION INTRAMUSCULAR; INTRAVENOUS at 11:05

## 2018-10-08 RX ADMIN — HEPARIN SODIUM 5000 UNITS: 5000 INJECTION, SOLUTION INTRAVENOUS; SUBCUTANEOUS at 21:52

## 2018-10-08 RX ADMIN — HYDROMORPHONE HYDROCHLORIDE 0.5 MG: 1 INJECTION, SOLUTION INTRAMUSCULAR; INTRAVENOUS; SUBCUTANEOUS at 19:54

## 2018-10-08 RX ADMIN — GABAPENTIN 400 MG: 400 CAPSULE ORAL at 21:52

## 2018-10-08 RX ADMIN — SODIUM CHLORIDE, SODIUM LACTATE, POTASSIUM CHLORIDE, AND CALCIUM CHLORIDE 125 ML/HR: .6; .31; .03; .02 INJECTION, SOLUTION INTRAVENOUS at 13:32

## 2018-10-08 RX ADMIN — MESALAMINE 500 MG: 250 CAPSULE ORAL at 08:46

## 2018-10-08 RX ADMIN — ACETAMINOPHEN 975 MG: 325 TABLET, FILM COATED ORAL at 06:29

## 2018-10-08 RX ADMIN — SODIUM CHLORIDE, SODIUM LACTATE, POTASSIUM CHLORIDE, AND CALCIUM CHLORIDE 125 ML/HR: .6; .31; .03; .02 INJECTION, SOLUTION INTRAVENOUS at 21:46

## 2018-10-08 RX ADMIN — ONDANSETRON 4 MG: 2 INJECTION INTRAMUSCULAR; INTRAVENOUS at 19:54

## 2018-10-08 RX ADMIN — ONDANSETRON 4 MG: 2 INJECTION INTRAMUSCULAR; INTRAVENOUS at 06:07

## 2018-10-08 RX ADMIN — SODIUM CHLORIDE 500 ML: 0.9 INJECTION, SOLUTION INTRAVENOUS at 06:12

## 2018-10-08 RX ADMIN — Medication 30000 UNITS: at 08:46

## 2018-10-08 RX ADMIN — OXYCODONE HYDROCHLORIDE 10 MG: 5 TABLET ORAL at 01:53

## 2018-10-08 RX ADMIN — THIAMINE HCL TAB 100 MG 100 MG: 100 TAB at 08:46

## 2018-10-08 RX ADMIN — BISACODYL 10 MG: 10 SUPPOSITORY RECTAL at 08:47

## 2018-10-08 RX ADMIN — POTASSIUM CHLORIDE 20 MEQ: 200 INJECTION, SOLUTION INTRAVENOUS at 09:35

## 2018-10-08 RX ADMIN — LISINOPRIL 2.5 MG: 2.5 TABLET ORAL at 08:47

## 2018-10-08 NOTE — PROGRESS NOTES
Progress Note - Colorectal   Simone Bell 66 y o  male MRN: [id number]  Unit/Bed#: [facility] 815-01 Encounter: [id number]      Objective:  Patient feeling miserable  Patient had another bout of emesis, small amount this time  States he feels bloated  Had a large bowel movement this morning and states he feels somewhat better  Nauseated  On 5 L of O2 due to his oxygenation sats in the high 80s  Patient is a chronic and heavy smoker  He denies shortness of breath, and no dyspnea patient was able to be eat a regular diet yesterday, and then vomited  Patient is not ambulating the halls as often as he should  His urine output is minimal     250 + 1 UA  2 emesis  1 BM    Blood pressure 110/65, pulse (!) 109, temperature 98 4 °F (36 9 °C), temperature source Oral, resp  rate 20, height 5' 6" (1 676 m), weight 63 5 kg (140 lb), SpO2 (!) 89 %  ,Body mass index is 22 6 kg/m²  Intake/Output Summary (Last 24 hours) at 10/08/18 0543  Last data filed at 10/07/18 2039   Gross per 24 hour   Intake           1312 5 ml   Output              250 ml   Net           1062 5 ml       Invasive Devices     Peripheral Intravenous Line            Peripheral IV 10/05/18 Right Wrist 3 days    Peripheral IV 10/05/18 Left Antecubital 2 days                Physical Exam:   Abdomen:  Soft, distended, tympanic, right horizontal abdominal wound is clean and dry, with present in the center of the wound, sutures intact  Extremities:  He has no pedal edema, no calf tenderness    Lab, Imaging and other studies:  Pending  VTE Pharmacologic Prophylaxis: Heparin  VTE Mechanical Prophylaxis: sequential compression device    Assessment:  10/5/18 ileostomy closure  COPD  ETOH  CKD    Plan:  1  Continue NPO for now  2  ?  Nasogastric tube insertion  3  Continue IV fluids  4  Monitor urine output  5  Will change IV fluids to D51/2N+Kcl  6  Check a m  Labs  7    Needs to be out of bed for most of the day and ambulating the halls

## 2018-10-08 NOTE — PROGRESS NOTES
Surgical Handoff Update      Findings/Procedures/Diagnostic Test   Procedures:   10/5 Ileostomy reversal - Dippolito        Summary/To Do  Dippolito  66 y o  male w/ perforated transverse colon 2/2 ischemia s/p ex lap, EDITH, ileocolonic resection with primary anastomosis, diverting ileostomy 5/22/18   PMH: UC, DM, HTN, HLD, MI, GERD, CAD, CKD, COPD, asthma, appy    Meds:  SQH, Mesalamine, ASA 81, Reglan 10 ATC      Plan:   NPO/NGT  [email or web address]  Vallorie Carrel -- keep on discharge?    Sutures

## 2018-10-08 NOTE — PROGRESS NOTES
Patient had 1 occurrence of emesis into his trash can  Called and spoke with Dr Jodie Reese  Will make patient NPO and start IV fluids

## 2018-10-08 NOTE — RESPIRATORY THERAPY NOTE
RT Protocol Note  Doc Laci 66 y o  male MRN: [id number]  Unit/Bed#: [facility] 830-24 Encounter: [id number]    Assessment    Principal Problem:    Ileostomy in place [facility])      Home Pulmonary Medications:  Albuterol MDI PRN, Albuterol UDN PRN       Past Medical History:   Diagnosis Date    Anxiety     Asthma     Cardiac disease     Cervical stenosis (uterine cervix)     Chest pain     Chronic kidney disease     Colitis     Colon polyps     COPD (chronic obstructive pulmonary disease) (HCC)     2L @ HS and PRN    Coronary artery disease     Diverticulitis     Esophageal reflux     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HCC)     Hyperlipidemia     Hypertension     IBD (inflammatory bowel disease)     [facility]ocardial infarction ([facility] [address] )     Old myocardial infarction     Perforation of colon (HCC)     [facility]     Ulcerative colitis ([facility] )      Social History     Social History    Marital status:      Spouse name: N/A    Number of children: N/A    Years of education: N/A     Social History Main Topics    Smoking status: Current Every Day Smoker     Packs/day: 3 00     Years: 40 00     Types: Cigars    Smokeless tobacco: Never Used      Comment: 3 cigars per day    Alcohol use No      Comment: pt "quit about 4 months ago"    Drug use: No    Sexual activity: Yes     Partners: Female     Birth control/ protection: None     Other Topics Concern    None     Social History Narrative    None       Subjective    Subjective Data: C/O NG tube irritation  Objective    Physical Exam:   Assessment Type: Assess only  General Appearance: Alert, Awake  Respiratory Pattern: Normal  Chest Assessment: Chest expansion symmetrical  Bilateral Breath Sounds: Diminished  O2 Device: NC    Vitals:  Blood pressure 109/58, pulse 103, temperature 99 1 °F (37 3 °C), temperature source Oral, resp  rate 20, height 5' 6" (1 676 m), weight 63 5 kg (140 lb), SpO2 91 %            Imaging and other studies: I have personally reviewed pertinent reports  O2 Device: NC     Plan    Respiratory Plan: Home Bronchodilator Patient pathway        Resp Comments: (P) Patient with Hx of COPD and has Albuterol UDN PRN, Albuterol MDI PRN for home  Patient admitted /p ileostomy reversal   Will continue home medications while admitted

## 2018-10-08 NOTE — SOCIAL WORK
Cm reviewed patient during care coordination rounds with [facility]  Patient is NPO and has IVF  Patient to be OOB  Patient is on 5L 02  [facility] following for PT services  Cm following

## 2018-10-09 LAB
ANION GAP SERPL CALCULATED.3IONS-SCNC: 2 MMOL/L (ref 4–13)
BASOPHILS # BLD AUTO: 0.03 THOUSANDS/ΜL (ref 0–0.1)
BASOPHILS NFR BLD AUTO: 0 % (ref 0–1)
BUN SERPL-MCNC: 11 MG/DL (ref 5–25)
CALCIUM SERPL-MCNC: 8.7 MG/DL (ref 8.3–10.1)
CHLORIDE SERPL-SCNC: 98 MMOL/L (ref 100–108)
CO2 SERPL-SCNC: 39 MMOL/L (ref 21–32)
CREAT SERPL-MCNC: 0.98 MG/DL (ref 0.6–1.3)
EOSINOPHIL # BLD AUTO: 0.29 THOUSAND/ΜL (ref 0–0.61)
EOSINOPHIL NFR BLD AUTO: 3 % (ref 0–6)
ERYTHROCYTE [DISTWIDTH] IN BLOOD BY AUTOMATED COUNT: 15.9 % (ref 11.6–15.1)
GFR SERPL CREATININE-BSD FRML MDRD: 86 ML/MIN/1.73SQ M
GLUCOSE SERPL-MCNC: 82 MG/DL (ref 65–140)
HCT VFR BLD AUTO: 29.3 % (ref 36.5–49.3)
HGB BLD-MCNC: 8.9 G/DL (ref 12–17)
IMM GRANULOCYTES # BLD AUTO: 0.03 THOUSAND/UL (ref 0–0.2)
IMM GRANULOCYTES NFR BLD AUTO: 0 % (ref 0–2)
LYMPHOCYTES # BLD AUTO: 1.65 THOUSANDS/ΜL (ref 0.6–4.47)
LYMPHOCYTES NFR BLD AUTO: 17 % (ref 14–44)
MAGNESIUM SERPL-MCNC: 1.7 MG/DL (ref 1.6–2.6)
MCH RBC QN AUTO: 26.5 PG (ref 26.8–34.3)
MCHC RBC AUTO-ENTMCNC: 30.4 G/DL (ref 31.4–37.4)
MCV RBC AUTO: 87 FL (ref 82–98)
MONOCYTES # BLD AUTO: 0.97 THOUSAND/ΜL (ref 0.17–1.22)
MONOCYTES NFR BLD AUTO: 10 % (ref 4–12)
NEUTROPHILS # BLD AUTO: 6.77 THOUSANDS/ΜL (ref 1.85–7.62)
NEUTS SEG NFR BLD AUTO: 70 % (ref 43–75)
NRBC BLD AUTO-RTO: 0 /100 WBCS
PLATELET # BLD AUTO: 248 THOUSANDS/UL (ref 149–390)
PMV BLD AUTO: 9.9 FL (ref 8.9–12.7)
POTASSIUM SERPL-SCNC: 3.9 MMOL/L (ref 3.5–5.3)
RBC # BLD AUTO: 3.36 MILLION/UL (ref 3.88–5.62)
SODIUM SERPL-SCNC: 139 MMOL/L (ref 136–145)
WBC # BLD AUTO: 9.74 THOUSAND/UL (ref 4.31–10.16)

## 2018-10-09 PROCEDURE — 94760 N-INVAS EAR/PLS OXIMETRY 1: CPT

## 2018-10-09 PROCEDURE — 80048 BASIC METABOLIC PNL TOTAL CA: CPT | Performed by: PHYSICIAN ASSISTANT

## 2018-10-09 PROCEDURE — 85025 COMPLETE CBC W/AUTO DIFF WBC: CPT | Performed by: PHYSICIAN ASSISTANT

## 2018-10-09 PROCEDURE — 83735 ASSAY OF MAGNESIUM: CPT | Performed by: PHYSICIAN ASSISTANT

## 2018-10-09 RX ORDER — MAGNESIUM SULFATE HEPTAHYDRATE 40 MG/ML
2 INJECTION, SOLUTION INTRAVENOUS ONCE
Status: COMPLETED | OUTPATIENT
Start: 2018-10-09 | End: 2018-10-09

## 2018-10-09 RX ORDER — AMIODARONE HYDROCHLORIDE 200 MG/1
200 TABLET ORAL DAILY
Status: DISCONTINUED | OUTPATIENT
Start: 2018-10-10 | End: 2018-10-15 | Stop reason: HOSPADM

## 2018-10-09 RX ORDER — OXYCODONE HYDROCHLORIDE AND ACETAMINOPHEN 5; 325 MG/1; MG/1
2 TABLET ORAL EVERY 4 HOURS PRN
Status: DISCONTINUED | OUTPATIENT
Start: 2018-10-09 | End: 2018-10-10

## 2018-10-09 RX ORDER — KETOROLAC TROMETHAMINE 30 MG/ML
15 INJECTION, SOLUTION INTRAMUSCULAR; INTRAVENOUS EVERY 6 HOURS SCHEDULED
Status: COMPLETED | OUTPATIENT
Start: 2018-10-09 | End: 2018-10-11

## 2018-10-09 RX ORDER — FOLIC ACID 1 MG/1
1000 TABLET ORAL DAILY
Status: DISCONTINUED | OUTPATIENT
Start: 2018-10-10 | End: 2018-10-15 | Stop reason: HOSPADM

## 2018-10-09 RX ORDER — DEXTROSE, SODIUM CHLORIDE, AND POTASSIUM CHLORIDE 5; .45; .15 G/100ML; G/100ML; G/100ML
84 INJECTION INTRAVENOUS CONTINUOUS
Status: DISCONTINUED | OUTPATIENT
Start: 2018-10-09 | End: 2018-10-14

## 2018-10-09 RX ADMIN — FOLIC ACID 1000 MCG: 1 TABLET ORAL at 08:50

## 2018-10-09 RX ADMIN — SODIUM CHLORIDE, SODIUM LACTATE, POTASSIUM CHLORIDE, AND CALCIUM CHLORIDE 500 ML: .6; .31; .03; .02 INJECTION, SOLUTION INTRAVENOUS at 02:39

## 2018-10-09 RX ADMIN — OXYCODONE HYDROCHLORIDE AND ACETAMINOPHEN 2 TABLET: 5; 325 TABLET ORAL at 21:06

## 2018-10-09 RX ADMIN — Medication 1 SPRAY: at 00:22

## 2018-10-09 RX ADMIN — GABAPENTIN 400 MG: 400 CAPSULE ORAL at 21:06

## 2018-10-09 RX ADMIN — ASPIRIN 81 MG 81 MG: 81 TABLET ORAL at 08:50

## 2018-10-09 RX ADMIN — GABAPENTIN 400 MG: 400 CAPSULE ORAL at 16:59

## 2018-10-09 RX ADMIN — NICOTINE 1 PATCH: 7 PATCH, EXTENDED RELEASE TRANSDERMAL at 08:50

## 2018-10-09 RX ADMIN — AMIODARONE HYDROCHLORIDE 200 MG: 200 TABLET ORAL at 08:50

## 2018-10-09 RX ADMIN — OXYCODONE HYDROCHLORIDE AND ACETAMINOPHEN 2 TABLET: 5; 325 TABLET ORAL at 16:59

## 2018-10-09 RX ADMIN — OXYCODONE HYDROCHLORIDE AND ACETAMINOPHEN 2 TABLET: 5; 325 TABLET ORAL at 11:24

## 2018-10-09 RX ADMIN — DEXTROSE, SODIUM CHLORIDE, AND POTASSIUM CHLORIDE 100 ML/HR: 5; .45; .15 INJECTION INTRAVENOUS at 11:25

## 2018-10-09 RX ADMIN — METOCLOPRAMIDE 10 MG: 5 INJECTION, SOLUTION INTRAMUSCULAR; INTRAVENOUS at 17:00

## 2018-10-09 RX ADMIN — METOCLOPRAMIDE 10 MG: 5 INJECTION, SOLUTION INTRAMUSCULAR; INTRAVENOUS at 23:38

## 2018-10-09 RX ADMIN — HYDROMORPHONE HYDROCHLORIDE 0.5 MG: 1 INJECTION, SOLUTION INTRAMUSCULAR; INTRAVENOUS; SUBCUTANEOUS at 05:16

## 2018-10-09 RX ADMIN — ONDANSETRON 4 MG: 2 INJECTION INTRAMUSCULAR; INTRAVENOUS at 08:50

## 2018-10-09 RX ADMIN — HYDROMORPHONE HYDROCHLORIDE 0.5 MG: 1 INJECTION, SOLUTION INTRAMUSCULAR; INTRAVENOUS; SUBCUTANEOUS at 08:49

## 2018-10-09 RX ADMIN — THIAMINE HYDROCHLORIDE 100 MG: 100 INJECTION, SOLUTION INTRAMUSCULAR; INTRAVENOUS at 08:58

## 2018-10-09 RX ADMIN — MAGNESIUM SULFATE HEPTAHYDRATE 2 G: 40 INJECTION, SOLUTION INTRAVENOUS at 11:27

## 2018-10-09 RX ADMIN — METOCLOPRAMIDE 10 MG: 5 INJECTION, SOLUTION INTRAMUSCULAR; INTRAVENOUS at 11:24

## 2018-10-09 RX ADMIN — METOCLOPRAMIDE 10 MG: 5 INJECTION, SOLUTION INTRAMUSCULAR; INTRAVENOUS at 05:16

## 2018-10-09 RX ADMIN — SODIUM CHLORIDE 1000 ML: 0.9 INJECTION, SOLUTION INTRAVENOUS at 05:18

## 2018-10-09 RX ADMIN — HEPARIN SODIUM 5000 UNITS: 5000 INJECTION, SOLUTION INTRAVENOUS; SUBCUTANEOUS at 05:16

## 2018-10-09 RX ADMIN — KETOROLAC TROMETHAMINE 15 MG: 30 INJECTION, SOLUTION INTRAMUSCULAR at 23:44

## 2018-10-09 RX ADMIN — METOCLOPRAMIDE 10 MG: 5 INJECTION, SOLUTION INTRAMUSCULAR; INTRAVENOUS at 00:22

## 2018-10-09 RX ADMIN — DEXTROSE, SODIUM CHLORIDE, AND POTASSIUM CHLORIDE 125 ML/HR: 5; .45; .15 INJECTION INTRAVENOUS at 05:16

## 2018-10-09 RX ADMIN — TIOTROPIUM BROMIDE 18 MCG: 18 CAPSULE ORAL; RESPIRATORY (INHALATION) at 08:51

## 2018-10-09 RX ADMIN — DEXTROSE, SODIUM CHLORIDE, AND POTASSIUM CHLORIDE 100 ML/HR: 5; .45; .15 INJECTION INTRAVENOUS at 23:38

## 2018-10-09 RX ADMIN — HEPARIN SODIUM 5000 UNITS: 5000 INJECTION, SOLUTION INTRAVENOUS; SUBCUTANEOUS at 21:06

## 2018-10-09 RX ADMIN — HYDROMORPHONE HYDROCHLORIDE 0.5 MG: 1 INJECTION, SOLUTION INTRAMUSCULAR; INTRAVENOUS; SUBCUTANEOUS at 00:22

## 2018-10-09 RX ADMIN — KETOROLAC TROMETHAMINE 15 MG: 30 INJECTION, SOLUTION INTRAMUSCULAR at 11:27

## 2018-10-09 RX ADMIN — KETOROLAC TROMETHAMINE 15 MG: 30 INJECTION, SOLUTION INTRAMUSCULAR at 17:00

## 2018-10-09 RX ADMIN — HYDROMORPHONE HYDROCHLORIDE 0.5 MG: 1 INJECTION, SOLUTION INTRAMUSCULAR; INTRAVENOUS; SUBCUTANEOUS at 02:34

## 2018-10-09 RX ADMIN — GABAPENTIN 400 MG: 400 CAPSULE ORAL at 08:50

## 2018-10-09 NOTE — PROGRESS NOTES
Progress Note - Colorectal   Jocelyn Wilson 66 y o  male MRN: [id number]  Unit/Bed#: [facility] 815-01 Encounter: [id number]      Objective:  Patient with extremely low urine output, has had close to 4 L from his NG tube is out, bladder scanned in for only 100 mL of urine  Patient is tachycardic at 110  hypotensive approximately 103/56  Patient has had at 86% on 2 L  patient complaining of NG tube discomfort, no flatus, no bowel movements, and urine extremely concentrated  Patient was given a 500 mL bolus of lactated Ringer's  Will bolus with another 1 L of normal saline  Patient denies nausea however the NG tube is draining large amount of green fluid  325 UA  3400 NGT    Blood pressure 103/56, pulse (!) 110, temperature 98 6 °F (37 °C), temperature source Oral, resp  rate 20, height 5' 6" (1 676 m), weight 63 5 kg (140 lb), SpO2 (!) 86 %  ,Body mass index is 22 6 kg/m²  Intake/Output Summary (Last 24 hours) at 10/09/18 0514  Last data filed at 10/08/18 2326   Gross per 24 hour   Intake          1027 08 ml   Output             3725 ml   Net         -2697 92 ml       Invasive Devices     Peripheral Intravenous Line            Peripheral IV 10/05/18 Right Wrist 4 days    Peripheral IV 10/05/18 Left Antecubital 3 days          Drain            NG/OG/Enteral Tube Nasogastric Left nares less than 1 day                Physical Exam:   Abdomen:  Less distended this morning, still tympanic, incisional tenderness, wound right mid abdomen ileostomy closure site is clean and dry, wick in the center of wound, sutures are intact  Extremities:  No pedal edema, no calf tenderness    Lab, Imaging and other studies:  BMP pending stat  VTE Pharmacologic Prophylaxis: Heparin  VTE Mechanical Prophylaxis: sequential compression device    Assessment:  POD # 4 ileostomy closure  COPD home O2  ETOH        Plan:  1  Bladder scan patient   2  Bolus of 1 L normal saline for the tachycardia and hypotension  3  Continue NG tube  4  Out of bed ambulate halls 4-5 times daily  5  Check a m  labs for creatinine level  6  Work with incentive spirometry more  7    Continue with IV fluids maintenance at 125 mL/hour

## 2018-10-09 NOTE — NURSING NOTE
Pt having low urine output  Has only put out 100ml of urine in 8 hours  Paged Bill SX per Gisell Martino MD give patient 500 bolus of LR over one hour  Will continue to monitor

## 2018-10-09 NOTE — PROGRESS NOTES
Into administer scheduled meds, pt asleep when entering room, when awakened, pt rating pain a 10/10, requesting iv meds; instructed patient he no longer has iv dilaudid and that he has as needed percocet and scheduled toradol at this time; pt also wanting to eat regular food, requesting applesauce; re- instructed him, he is on sips of clears  and that he is not able to have a regular diet at this time; pt agreeable for now

## 2018-10-09 NOTE — PROGRESS NOTES
Notified by manjeet guerra, "the patient is requesting his iv med every two hours and he asked me to bring it in  I told him the nurse will bring it in"; pt states "the iv med is every two hours, I want it every two hours"; instructed patient, the med is every 2hrs as needed for pain and it is not around the clock; santa porter pac aware and dr paige at bedside and aware; per dr Barbera Goltz, "if the patient had a bowel movement, we are pulling the ngt and giving him oral pain medication   If not, I will move the dilaudid to every 4 hours"; pt had bowel movement this am, dr paige removed ngt; pt requesting regular diet; educated patient on diet change and that most likely he will start out on a clear liquid diet or sips of clear diet; pt continues to request regular diet; again educated patient on taking his diet slow and not to take in a lot of liquids too quickly; multiple cans of potato chips noted at bedside; instructed patient to not eat them at this time; also, reinforced importance of ambulating and incentive spirometry

## 2018-10-09 NOTE — NURSING NOTE
Pt still has not voided after bolus  Spoke with Trena Osborn PA-C  Orders for additional bolus and fluid changes will be placed  Pt bladder scanned 103 showing in bladder  Will follow orders as entered and continue to monitor

## 2018-10-09 NOTE — SOCIAL WORK
Cm reviewed patient during care coordination rounds with [facility]  Patient not stable for discharge today  Patient is PO4 ileostomy closer  Patient is tachycardic, hypotensive, and has low urine output  Patient has NGT and IVF  Patient to get bladder scan  Patient has home 02 (COPD) and is at 86% on 2L 02 at [facility]  Cm following

## 2018-10-10 LAB
ANION GAP SERPL CALCULATED.3IONS-SCNC: 3 MMOL/L (ref 4–13)
BUN SERPL-MCNC: 12 MG/DL (ref 5–25)
CALCIUM SERPL-MCNC: 7.8 MG/DL (ref 8.3–10.1)
CHLORIDE SERPL-SCNC: 99 MMOL/L (ref 100–108)
CO2 SERPL-SCNC: 34 MMOL/L (ref 21–32)
CREAT SERPL-MCNC: 1.02 MG/DL (ref 0.6–1.3)
GFR SERPL CREATININE-BSD FRML MDRD: 82 ML/MIN/1.73SQ M
GLUCOSE SERPL-MCNC: 112 MG/DL (ref 65–140)
MAGNESIUM SERPL-MCNC: 1.9 MG/DL (ref 1.6–2.6)
POTASSIUM SERPL-SCNC: 3.7 MMOL/L (ref 3.5–5.3)
SODIUM SERPL-SCNC: 136 MMOL/L (ref 136–145)

## 2018-10-10 PROCEDURE — 94760 N-INVAS EAR/PLS OXIMETRY 1: CPT

## 2018-10-10 PROCEDURE — 80048 BASIC METABOLIC PNL TOTAL CA: CPT | Performed by: PHYSICIAN ASSISTANT

## 2018-10-10 PROCEDURE — 83735 ASSAY OF MAGNESIUM: CPT | Performed by: PHYSICIAN ASSISTANT

## 2018-10-10 RX ORDER — HYDROMORPHONE HCL/PF 1 MG/ML
0.5 SYRINGE (ML) INJECTION EVERY 2 HOUR PRN
Status: DISCONTINUED | OUTPATIENT
Start: 2018-10-10 | End: 2018-10-12

## 2018-10-10 RX ORDER — LORAZEPAM 2 MG/ML
0.5 INJECTION INTRAMUSCULAR ONCE
Status: COMPLETED | OUTPATIENT
Start: 2018-10-10 | End: 2018-10-10

## 2018-10-10 RX ORDER — MAGNESIUM SULFATE HEPTAHYDRATE 40 MG/ML
2 INJECTION, SOLUTION INTRAVENOUS ONCE
Status: COMPLETED | OUTPATIENT
Start: 2018-10-10 | End: 2018-10-10

## 2018-10-10 RX ORDER — LIDOCAINE HYDROCHLORIDE 20 MG/ML
JELLY TOPICAL ONCE
Status: COMPLETED | OUTPATIENT
Start: 2018-10-10 | End: 2018-10-10

## 2018-10-10 RX ORDER — POTASSIUM CHLORIDE 14.9 MG/ML
20 INJECTION INTRAVENOUS ONCE
Status: COMPLETED | OUTPATIENT
Start: 2018-10-10 | End: 2018-10-10

## 2018-10-10 RX ADMIN — DEXTROSE, SODIUM CHLORIDE, AND POTASSIUM CHLORIDE 125 ML/HR: 5; .45; .15 INJECTION INTRAVENOUS at 17:39

## 2018-10-10 RX ADMIN — KETOROLAC TROMETHAMINE 15 MG: 30 INJECTION, SOLUTION INTRAMUSCULAR at 11:58

## 2018-10-10 RX ADMIN — HEPARIN SODIUM 5000 UNITS: 5000 INJECTION, SOLUTION INTRAVENOUS; SUBCUTANEOUS at 16:11

## 2018-10-10 RX ADMIN — KETOROLAC TROMETHAMINE 15 MG: 30 INJECTION, SOLUTION INTRAMUSCULAR at 17:37

## 2018-10-10 RX ADMIN — NICOTINE 1 PATCH: 7 PATCH, EXTENDED RELEASE TRANSDERMAL at 11:36

## 2018-10-10 RX ADMIN — DEXTROSE, SODIUM CHLORIDE, AND POTASSIUM CHLORIDE 125 ML/HR: 5; .45; .15 INJECTION INTRAVENOUS at 23:17

## 2018-10-10 RX ADMIN — THIAMINE HYDROCHLORIDE 100 MG: 100 INJECTION, SOLUTION INTRAMUSCULAR; INTRAVENOUS at 11:54

## 2018-10-10 RX ADMIN — METOCLOPRAMIDE 10 MG: 5 INJECTION, SOLUTION INTRAMUSCULAR; INTRAVENOUS at 05:32

## 2018-10-10 RX ADMIN — AMIODARONE HYDROCHLORIDE 200 MG: 200 TABLET ORAL at 11:59

## 2018-10-10 RX ADMIN — GABAPENTIN 400 MG: 400 CAPSULE ORAL at 11:33

## 2018-10-10 RX ADMIN — KETOROLAC TROMETHAMINE 15 MG: 30 INJECTION, SOLUTION INTRAMUSCULAR at 05:32

## 2018-10-10 RX ADMIN — FOLIC ACID 1000 MCG: 1 TABLET ORAL at 11:35

## 2018-10-10 RX ADMIN — HEPARIN SODIUM 5000 UNITS: 5000 INJECTION, SOLUTION INTRAVENOUS; SUBCUTANEOUS at 05:32

## 2018-10-10 RX ADMIN — HEPARIN SODIUM 5000 UNITS: 5000 INJECTION, SOLUTION INTRAVENOUS; SUBCUTANEOUS at 21:07

## 2018-10-10 RX ADMIN — POTASSIUM CHLORIDE 20 MEQ: 200 INJECTION, SOLUTION INTRAVENOUS at 11:21

## 2018-10-10 RX ADMIN — MAGNESIUM SULFATE HEPTAHYDRATE 2 G: 40 INJECTION, SOLUTION INTRAVENOUS at 11:21

## 2018-10-10 RX ADMIN — GABAPENTIN 400 MG: 400 CAPSULE ORAL at 21:07

## 2018-10-10 RX ADMIN — METOCLOPRAMIDE 10 MG: 5 INJECTION, SOLUTION INTRAMUSCULAR; INTRAVENOUS at 17:37

## 2018-10-10 RX ADMIN — METOCLOPRAMIDE 10 MG: 5 INJECTION, SOLUTION INTRAMUSCULAR; INTRAVENOUS at 11:56

## 2018-10-10 RX ADMIN — BISACODYL 10 MG: 10 SUPPOSITORY RECTAL at 21:07

## 2018-10-10 RX ADMIN — SODIUM CHLORIDE 1000 ML: 0.9 INJECTION, SOLUTION INTRAVENOUS at 05:41

## 2018-10-10 RX ADMIN — KETOROLAC TROMETHAMINE 15 MG: 30 INJECTION, SOLUTION INTRAMUSCULAR at 23:14

## 2018-10-10 RX ADMIN — ASPIRIN 81 MG 81 MG: 81 TABLET ORAL at 11:33

## 2018-10-10 RX ADMIN — DEXTROSE, SODIUM CHLORIDE, AND POTASSIUM CHLORIDE 125 ML/HR: 5; .45; .15 INJECTION INTRAVENOUS at 09:47

## 2018-10-10 RX ADMIN — LORAZEPAM 0.5 MG: 2 INJECTION INTRAMUSCULAR; INTRAVENOUS at 08:03

## 2018-10-10 RX ADMIN — METOCLOPRAMIDE 10 MG: 5 INJECTION, SOLUTION INTRAMUSCULAR; INTRAVENOUS at 23:14

## 2018-10-10 RX ADMIN — LIDOCAINE HYDROCHLORIDE: 20 JELLY TOPICAL at 08:03

## 2018-10-10 RX ADMIN — GABAPENTIN 400 MG: 400 CAPSULE ORAL at 16:11

## 2018-10-10 RX ADMIN — TIOTROPIUM BROMIDE 18 MCG: 18 CAPSULE ORAL; RESPIRATORY (INHALATION) at 11:37

## 2018-10-10 NOTE — PROGRESS NOTES
Progress Note - Colorectal Surgery   Ted Vargas 66 y o  male MRN: [id number]  Unit/Bed#: [facility] 815-01 Encounter: [id number]    Assessment:  65 y/o M w/ hx of perforated transverse colon d/t ischemia, ex-lap, ileocolonic resection w/ primary anastomosis, diverting ileostomy, s/p ileostomy reversal, POD #5    Plan:  --NPO, sips of clears  --D5 1/2 NS +20K @100  --Pain control  --OOB, ambulate  --SQH/SCDs    Subjective/Objective     Subjective:     No acute events overnight  Pt c/o nausea after each time attempting sips of liquids and increased bloating  +flatus, bm  Denies sneaking solid foods, but patient has several cans of Pringles, package of Felix's, and Peanut Chews next to his bed  Objective:     Blood pressure 151/53, pulse 93, temperature 98 4 °F (36 9 °C), temperature source Oral, resp  rate 17, height 5' 6" (1 676 m), weight 63 5 kg (140 lb), SpO2 90 %  ,Body mass index is 22 6 kg/m²  I/O       10/08 0701 - 10/09 0700 10/09 0701 - 10/10 0700    P  O  0 238    I V  (mL/kg) 1027 1 (16 2) 1930 8 (30 4)    IV Piggyback  100    Total Intake(mL/kg) 1027 1 (16 2) 2268 8 (35 7)    Urine (mL/kg/hr) 325 (0 2) 520 (0 3)    Emesis/NG output 4400     Stool 0     Total Output 4725 520    Net -3697 9 +1748 8          Unmeasured Urine Occurrence 1 x 1 x    Unmeasured Stool Occurrence  1 x            Invasive Devices     Peripheral Intravenous Line            Peripheral IV 10/09/18 Right Arm 1 day                Physical Exam:    GEN: NAD  HEENT: MMM  CV: RRR  Lung: normal effort, on 4L NC  Ab: Soft, distended, tympanic to percussion  Extrem: No CCE  Neuro:  A+Ox3, motor and sensation grossly intact      Lab, Imaging and other studies:CBC: No results found for: WBC, HGB, HCT, MCV, PLT, ADJUSTEDWBC, MCH, MCHC, RDW, MPV, NRBC, CMP: No results found for: NA, K, CL, CO2, ANIONGAP, BUN, CREATININE, GLUCOSE, CALCIUM, AST, ALT, ALKPHOS, PROT, ALBUMIN, BILITOT, EGFR, Coagulation: No results found for: PT, INR, APTT, Urinalysis: No results found for: Juline Royer, SPECGRAV, PHUR, LEUKOCYTESUR, NITRITE, PROTEINUA, GLUCOSEU, KETONESU, BILIRUBINUR, BLOODU, Amylase: No results found for: AMYLASE, Lipase: No results found for: LIPASE  VTE Pharmacologic Prophylaxis: Heparin  VTE Mechanical Prophylaxis: sequential compression device

## 2018-10-10 NOTE — PROGRESS NOTES
Patient has not urinated x 12 hours and bladder scan is 100 ml's  Patient was having low urine output prior and responded to fluid challenges  Bolus of 1 liter NSS now     IV fluids at 100 ml/hr      NGT inserted and 2 liters of light olive green fluid returned immediately

## 2018-10-10 NOTE — SOCIAL WORK
Cm reviewed patient during care coordination rounds with [facility]  Patient not stable for discharge today  Patient on 02 (COPD, has 02 at home)  NGT replaced  Ileostomy reversal   IVF  NPO with sips of clears  OOB  Cm following

## 2018-10-11 LAB
ANION GAP SERPL CALCULATED.3IONS-SCNC: 2 MMOL/L (ref 4–13)
BASOPHILS # BLD AUTO: 0.02 THOUSANDS/ΜL (ref 0–0.1)
BASOPHILS NFR BLD AUTO: 0 % (ref 0–1)
BUN SERPL-MCNC: 11 MG/DL (ref 5–25)
CALCIUM SERPL-MCNC: 7.7 MG/DL (ref 8.3–10.1)
CHLORIDE SERPL-SCNC: 103 MMOL/L (ref 100–108)
CO2 SERPL-SCNC: 35 MMOL/L (ref 21–32)
CREAT SERPL-MCNC: 0.98 MG/DL (ref 0.6–1.3)
EOSINOPHIL # BLD AUTO: 0.51 THOUSAND/ΜL (ref 0–0.61)
EOSINOPHIL NFR BLD AUTO: 5 % (ref 0–6)
ERYTHROCYTE [DISTWIDTH] IN BLOOD BY AUTOMATED COUNT: 15.7 % (ref 11.6–15.1)
GFR SERPL CREATININE-BSD FRML MDRD: 86 ML/MIN/1.73SQ M
GLUCOSE SERPL-MCNC: 111 MG/DL (ref 65–140)
HCT VFR BLD AUTO: 26.4 % (ref 36.5–49.3)
HGB BLD-MCNC: 8 G/DL (ref 12–17)
IMM GRANULOCYTES # BLD AUTO: 0.04 THOUSAND/UL (ref 0–0.2)
IMM GRANULOCYTES NFR BLD AUTO: 0 % (ref 0–2)
LYMPHOCYTES # BLD AUTO: 1.3 THOUSANDS/ΜL (ref 0.6–4.47)
LYMPHOCYTES NFR BLD AUTO: 13 % (ref 14–44)
MAGNESIUM SERPL-MCNC: 2.2 MG/DL (ref 1.6–2.6)
MCH RBC QN AUTO: 26.9 PG (ref 26.8–34.3)
MCHC RBC AUTO-ENTMCNC: 30.3 G/DL (ref 31.4–37.4)
MCV RBC AUTO: 89 FL (ref 82–98)
MONOCYTES # BLD AUTO: 1.08 THOUSAND/ΜL (ref 0.17–1.22)
MONOCYTES NFR BLD AUTO: 11 % (ref 4–12)
NEUTROPHILS # BLD AUTO: 7.33 THOUSANDS/ΜL (ref 1.85–7.62)
NEUTS SEG NFR BLD AUTO: 71 % (ref 43–75)
NRBC BLD AUTO-RTO: 0 /100 WBCS
PLATELET # BLD AUTO: 255 THOUSANDS/UL (ref 149–390)
PMV BLD AUTO: 9.9 FL (ref 8.9–12.7)
POTASSIUM SERPL-SCNC: 3.9 MMOL/L (ref 3.5–5.3)
RBC # BLD AUTO: 2.97 MILLION/UL (ref 3.88–5.62)
SODIUM SERPL-SCNC: 140 MMOL/L (ref 136–145)
WBC # BLD AUTO: 10.28 THOUSAND/UL (ref 4.31–10.16)

## 2018-10-11 PROCEDURE — 85025 COMPLETE CBC W/AUTO DIFF WBC: CPT | Performed by: PHYSICIAN ASSISTANT

## 2018-10-11 PROCEDURE — 83735 ASSAY OF MAGNESIUM: CPT | Performed by: PHYSICIAN ASSISTANT

## 2018-10-11 PROCEDURE — G8979 MOBILITY GOAL STATUS: HCPCS

## 2018-10-11 PROCEDURE — 94760 N-INVAS EAR/PLS OXIMETRY 1: CPT | Performed by: SOCIAL WORKER

## 2018-10-11 PROCEDURE — G8978 MOBILITY CURRENT STATUS: HCPCS

## 2018-10-11 PROCEDURE — 94640 AIRWAY INHALATION TREATMENT: CPT | Performed by: SOCIAL WORKER

## 2018-10-11 PROCEDURE — 80048 BASIC METABOLIC PNL TOTAL CA: CPT | Performed by: PHYSICIAN ASSISTANT

## 2018-10-11 PROCEDURE — 97163 PT EVAL HIGH COMPLEX 45 MIN: CPT

## 2018-10-11 RX ORDER — KETOROLAC TROMETHAMINE 30 MG/ML
15 INJECTION, SOLUTION INTRAMUSCULAR; INTRAVENOUS EVERY 6 HOURS SCHEDULED
Status: COMPLETED | OUTPATIENT
Start: 2018-10-11 | End: 2018-10-12

## 2018-10-11 RX ADMIN — KETOROLAC TROMETHAMINE 15 MG: 30 INJECTION, SOLUTION INTRAMUSCULAR at 18:03

## 2018-10-11 RX ADMIN — THIAMINE HYDROCHLORIDE 100 MG: 100 INJECTION, SOLUTION INTRAMUSCULAR; INTRAVENOUS at 10:43

## 2018-10-11 RX ADMIN — ALBUTEROL SULFATE 2.5 MG: 2.5 SOLUTION RESPIRATORY (INHALATION) at 07:46

## 2018-10-11 RX ADMIN — KETOROLAC TROMETHAMINE 15 MG: 30 INJECTION, SOLUTION INTRAMUSCULAR at 10:26

## 2018-10-11 RX ADMIN — DEXTROSE, SODIUM CHLORIDE, AND POTASSIUM CHLORIDE 125 ML/HR: 5; .45; .15 INJECTION INTRAVENOUS at 07:19

## 2018-10-11 RX ADMIN — METOCLOPRAMIDE 10 MG: 5 INJECTION, SOLUTION INTRAMUSCULAR; INTRAVENOUS at 13:53

## 2018-10-11 RX ADMIN — DEXTROSE, SODIUM CHLORIDE, AND POTASSIUM CHLORIDE 125 ML/HR: 5; .45; .15 INJECTION INTRAVENOUS at 16:44

## 2018-10-11 RX ADMIN — KETOROLAC TROMETHAMINE 15 MG: 30 INJECTION, SOLUTION INTRAMUSCULAR at 13:53

## 2018-10-11 RX ADMIN — METOCLOPRAMIDE 10 MG: 5 INJECTION, SOLUTION INTRAMUSCULAR; INTRAVENOUS at 23:43

## 2018-10-11 RX ADMIN — GABAPENTIN 400 MG: 400 CAPSULE ORAL at 21:40

## 2018-10-11 RX ADMIN — KETOROLAC TROMETHAMINE 15 MG: 30 INJECTION, SOLUTION INTRAMUSCULAR at 05:14

## 2018-10-11 RX ADMIN — SODIUM CHLORIDE 1000 ML: 0.9 INJECTION, SOLUTION INTRAVENOUS at 01:16

## 2018-10-11 RX ADMIN — FOLIC ACID 1000 MCG: 1 TABLET ORAL at 10:25

## 2018-10-11 RX ADMIN — GABAPENTIN 400 MG: 400 CAPSULE ORAL at 10:25

## 2018-10-11 RX ADMIN — AMIODARONE HYDROCHLORIDE 200 MG: 200 TABLET ORAL at 10:25

## 2018-10-11 RX ADMIN — ASPIRIN 81 MG 81 MG: 81 TABLET ORAL at 10:25

## 2018-10-11 RX ADMIN — KETOROLAC TROMETHAMINE 15 MG: 30 INJECTION, SOLUTION INTRAMUSCULAR at 23:43

## 2018-10-11 RX ADMIN — METOCLOPRAMIDE 10 MG: 5 INJECTION, SOLUTION INTRAMUSCULAR; INTRAVENOUS at 18:03

## 2018-10-11 RX ADMIN — GABAPENTIN 400 MG: 400 CAPSULE ORAL at 16:51

## 2018-10-11 RX ADMIN — HEPARIN SODIUM 5000 UNITS: 5000 INJECTION, SOLUTION INTRAVENOUS; SUBCUTANEOUS at 05:14

## 2018-10-11 RX ADMIN — HYDROMORPHONE HYDROCHLORIDE 0.5 MG: 1 INJECTION, SOLUTION INTRAMUSCULAR; INTRAVENOUS; SUBCUTANEOUS at 16:51

## 2018-10-11 RX ADMIN — TIOTROPIUM BROMIDE 18 MCG: 18 CAPSULE ORAL; RESPIRATORY (INHALATION) at 10:26

## 2018-10-11 RX ADMIN — METOCLOPRAMIDE 10 MG: 5 INJECTION, SOLUTION INTRAMUSCULAR; INTRAVENOUS at 05:14

## 2018-10-11 RX ADMIN — HEPARIN SODIUM 5000 UNITS: 5000 INJECTION, SOLUTION INTRAVENOUS; SUBCUTANEOUS at 21:40

## 2018-10-11 RX ADMIN — HYDROMORPHONE HYDROCHLORIDE 0.5 MG: 1 INJECTION, SOLUTION INTRAMUSCULAR; INTRAVENOUS; SUBCUTANEOUS at 20:18

## 2018-10-11 RX ADMIN — SODIUM CHLORIDE 1000 ML: 0.9 INJECTION, SOLUTION INTRAVENOUS at 07:40

## 2018-10-11 RX ADMIN — HEPARIN SODIUM 5000 UNITS: 5000 INJECTION, SOLUTION INTRAVENOUS; SUBCUTANEOUS at 13:53

## 2018-10-11 RX ADMIN — NICOTINE 1 PATCH: 7 PATCH, EXTENDED RELEASE TRANSDERMAL at 10:26

## 2018-10-11 NOTE — NURSING NOTE
Educated patient multiple on NGT restrictions the need to limit oral intake while in place  And that ice chips are permitted but need to be given in moderation   As this all is for the best in his recovery and per the physician orders  Pt stated his mouth gets dry, offered mouth swabs  Pt also can have lollipops and throat lozenges  Pt still not in agreement in to comply with instruction  Threatening to pull his BG tube out  Will continue to monitor

## 2018-10-11 NOTE — SOCIAL WORK
Cm reviewed patient during care coordination rounds with [facility]  Patient not stable for discharge today  Patient has NGT  COPD  Patient has 02 at home through [facility]  [facility] following for resumption of care (RN, PT/OT)  Patient to be OOB  Patient had BM  Cm following

## 2018-10-11 NOTE — PROGRESS NOTES
Giving another 1 liter bolus for low urine output  Voided 100 mls after last bolus   Urine still concentrated

## 2018-10-11 NOTE — PHYSICAL THERAPY NOTE
Physical Therapy Evaluation     Patient Name: Demi Chen    Today's Date: 10/11/2018     Problem List  Patient Active Problem List   Diagnosis    Crohn disease ([facility]    Spinal stenosis of cervical region    Hypertension    Pericarditis    Coronary artery disease involving native coronary artery of native heart without angina pectoris    Asthma    Atherosclerosis of coronary artery    COPD (chronic obstructive pulmonary disease) ([facility]    Dyslipidemia    Alcohol abuse    Cigarette nicotine dependence without complication    Alcohol dependence with withdrawal ([facility]    Acute respiratory insufficiency    Agitation    Heart failure, systolic, due to idiopathic cardiomyopathy ([facility] )    Ileostomy in place ([facility]    Moderate protein-calorie malnutrition (HCC)    Anemia    Hypocalcemia    Perforation of colon (HCC)    Hypotension    Intra-abdominal abscess ([facility]    DEBBY (acute kidney injury) ([facility] )    Abdominal pain    Ambulatory dysfunction    Presence of drug-eluting stent in right coronary artery    Paroxysmal atrial fibrillation (HCC)    Hypercalcemia    Hematemesis with nausea        Past Medical History  Past Medical History:   Diagnosis Date    Anxiety     Asthma     Cardiac disease     Cervical stenosis (uterine cervix)     Chest pain     Chronic kidney disease     Colitis     Colon polyps     COPD (chronic obstructive pulmonary disease) (HCC)     2L @ HS and PRN    Coronary artery disease     Diverticulitis     Esophageal reflux     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HCC)     Hyperlipidemia     Hypertension     IBD (inflammatory bowel disease)     Myocardial infarction ([facility]     Old myocardial infarction     Perforation of colon (HCC)     Type 2 diabetes, diet controlled ([facility] )     Ulcerative colitis ([facility] )         Past Surgical History  Past Surgical History:   Procedure Laterality Date    ANGIOPLASTY      APPENDECTOMY  COLON SURGERY      COLONOSCOPY N/A 10/24/2016    Procedure: COLONOSCOPY;  Surgeon: Carli Huizar MD;  [facility]; Service:     CORONARY ANGIOPLASTY WITH STENT PLACEMENT      ESOPHAGOGASTRODUODENOSCOPY N/A 10/24/2016    Procedure: ESOPHAGOGASTRODUODENOSCOPY (EGD); Surgeon: Carli Huizar MD;  [facility]; Service:     EXPLORATORY LAPAROTOMY W/ BOWEL RESECTION N/A 5/22/2018    Procedure: EXPLORATORY LAPAROTOMY, ILIOCOLECTOMY, ILIOCOLONIC ANASTAMOSIS, LOOP ILIOSTOMY, REPAIR OF SEROSAL TEAR, EXTENSIVE LYSIS OF ADHESIONS, WOUND VAC PLACEMENT;  Surgeon: Carli Huizar MD;  [facility];  Service: Colorectal    HEMICOLECTOMY      ILEOSTOMY CLOSURE N/A 10/5/2018    Procedure: Harriett Bench;  Surgeon: Carli Huizar MD;  [facility];  Service: Colorectal    OTHER SURGICAL HISTORY      stent indications acute myocardial infarction    IN COLONOSCOPY FLX DX W/COLLJ SPEC WHEN PFRMD N/A 2/6/2018    Procedure: COLONOSCOPY;  Surgeon: Carli Huizar MD;  [facility]; Service: Colorectal    IN COLONOSCOPY FLX DX W/COLLJ SPEC WHEN PFRMD N/A 10/4/2018    Procedure: COLONOSCOPY;  Surgeon: Carli Huizar MD;  [facility]; Service: Colorectal    TONSILLECTOMY        10/11/18 1230   Note Type   Note type Eval only   Pain Assessment   Pain Assessment 0-10   Pain Score 7   Pain Type Acute pain   Pain Location Abdomen;Nose  (N/G tube )   Pain Descriptors Sharp   Pain Frequency Constant/continuous   Clinical Progression Not changed   Patient's Stated Pain Goal No pain   Hospital Pain Intervention(s) Repositioned; Ambulation/increased activity; Emotional support   Response to Interventions unchanged but tolerated    Home Living   Type of Home House   Home Layout Multi-level; Able to live on main level with bedroom/bathroom  (pt stays on 1st floor in recliner- shower is on 2nd floor )   [address]  (3Lo2 at home at baseline )   Prior Function   Level of [place] Independent with ADLs and functional mobility   Lives With Family;Daughter   Receives Help From Family;Home health  (open to VNA- was receiving home PT/OT)   ADL Assistance Needs assistance   IADLs Needs assistance   Falls in the last 6 months 0   Vocational Retired   Comments pt reports MI w/ RW and home O2 at baseline-  was sponge bathing    Restrictions/Precautions   Weight Bearing Precautions Per Order No   Braces or Orthoses (none )   Other Precautions Impulsive;Multiple lines;O2;Fall Risk;Pain  (motivational barriers/ self limiting )   General   Additional Pertinent History N/G t o suction- reports he is going to pull his NG if MD does nt returnt o take it out- info relayed to CANDICE Cosme) - is aware and will inform MD-    Family/Caregiver Present No   Cognition   Orientation Level Oriented X4   Comments pt atgitated that his NG is still in- multiple complaints that he wants to eat and pull his NG (RN aware)- pt eucated in need to remain medically compliant- pt is cognitively intact w/ poor judgemetn and safety   RUE Assessment   RUE Assessment WFL   LUE Assessment   LUE Assessment WFL   RLE Assessment   RLE Assessment WFL  (3+/5 throughout )   LLE Assessment   LLE Assessment WFL  (3+/5 throughout )   Coordination   Movements are Fluid and Coordinated 0   Coordination and Movement Description antalgic    Light Touch   RLE Light Touch Impaired   RLE Light Touch Comments feet   LLE Light Touch Impaired   LLE Light Touch Comments feet   Bed Mobility   Supine to Sit 5  Supervision   Additional items (cues for technique and lines - safety)   Sit to Supine 5  Supervision   Transfers   Sit to Stand 5  Supervision   Stand to Sit 5  Supervision   Stand pivot 5  Supervision   Ambulation/Elevation   Gait pattern Foward flexed; Short stride;Decreased foot clearance; Forward Flexion; Excessively slow   Gait Assistance 5  Supervision   Additional items Assist x 1;Verbal cues  (for upright posture )   Assistive Device Rolling walker   Distance 150' x2 w/ RW and S- on 3Lo2 cues for safety and distance from [address] (refuses training w/ 1* focus of getting NG out)   Balance   Static Sitting Good   Dynamic Sitting Fair   Static Standing Poor +   Ambulatory Fair -  (rw)   Endurance Deficit   Endurance Deficit Yes   Endurance Deficit Description limited upright tolerance and gait distances    Activity Tolerance   Activity Tolerance Patient limited by fatigue;Patient limited by pain; Other (Comment)  (self limiting- agitated)   Medical Staff Made Aware yes- s/w AZUCENA and RN Kelvin Chi)    Nurse Made Aware yesTino Pastrana    Assessment   Prognosis Good   Problem List Decreased strength;Decreased range of motion;Decreased endurance; Impaired balance;Decreased coordination; Impaired judgement;Decreased safety awareness; Impaired sensation;Decreased skin integrity;Pain   Assessment Pt is 66 y o  male seen for PT evaluation s/p admit [facility] on 10/5/2018  Pt w/ hx of perforated colon 2* ischemic bowel admitted for elective closure of ileostomy performed on 22/9 w/ post op complications of hypotension; NG to suction; low urine output/ vomiting  NG to suction; able to ambulate clamped  PT now consulted for assessment of mobility and d/c needs  PMhx and  personal factors currently affecting pt's physical performance include: extensive PMhx as above as well as environmental barriers; motivational barriers; max cues for limited participation; threatening to pul out NG tube; O2 at home; KAREN; hx of ETOH abuse; agitation; afib; DEBBY: cervical spinal stenosis; fall risk; heart failure    Prior to admission, pt reports living w/ family in a [facility] w/ 1st setup- pt reports he sleeps in a recliner and sponge baths and has 24/7 care by family- also was receiving home PT/ OT and RN PTA   Upon evaluation, pt currently is requiring S A for bed skills; S for functional transfers and S for ambulation w/ RW on 3 LO2- pt is impulsive ; agitated and can be self limiting w/ anger and focus on getting NG tube out and eating- PT spent considerable time educating pt - however largely unsuccessful (RNVolney Less) and team aware  Pt presents functioning below baseline and currently w/ overall mobility deficits 2* to: decreased LE strength/AROM; limited flexibility;  generalized weakness/ deconditioning; decreased endurance; decreased activity tolerance; decreased coordination; impaired balance; gait deviations; decreased safety awareness; SOB/LOJA; fatigue; impaired safety and judgement; limited insight into current deficits; multiple lines; hearing impairment/ visual impairments; sensory impairment  Pt currently at risk for falls  (Please find additional objective findings from PT assessment regarding body systems outlined above ) Pt will continue to benefit from skilled PT interventions to address stated impairments; to maximize functional potential; for ongoing pt/ family training; and DME needs  PT is currently recommending home w/ family support; and home PT; use of RW as previous  Pt/ family agreeable to plan and goals as stated on evaluation  Goals   Patient Goals get NG out  eat    STG Expiration Date 10/21/18   Short Term Goal #1 In 10 days pt will complete: 1) Bed mobility skills with MI2) Functional transfers with MII 3) Ambulation with RW vs  least restrictive AD MI' >300 without LOB and stable vitals  4) Stair training up/ down flight step/s with 1 rail/s  and S for access to 1 bed and bath at home   5) Improve balance grades to Good 6) Improve LE strength grades by 1   7) LE HEP independently  8) PT for ongoing pt and family education; DME needs and D/C planning to promote highest level of function in least restrictive environment  Treatment Day 0   Plan   Treatment/Interventions Functional transfer training;LE strengthening/ROM; Elevations; Therapeutic exercise; Endurance training;Patient/family training;Equipment eval/education; Bed mobility;Gait training;Spoke to nursing;Spoke to case management;Spoke to advanced practitioner   PT Frequency (3-5days/ week )   Recommendation   Recommendation Home with family support;Home PT  (use of RW)   Equipment Recommended (RW)   PT - OK to Discharge (w/ home PT when medically cleared )   Modified Major Scale   Modified Major Scale 4   Barthel Index   Feeding 0   Bathing 0   Grooming Score 0   Dressing Score 10   Bladder Score 5   Bowels Score 10   Toilet Use Score 5   Transfers (Bed/Chair) Score 10   Mobility (Level Surface) Score 10   Stairs Score 0   Barthel Index Score 50        Dennis Em, PT

## 2018-10-11 NOTE — PROGRESS NOTES
Progress Note - Colorectal Surgery   Ludmila Valdez 66 y o  male MRN: [id number]  Unit/Bed#: [facility] 815-01 Encounter: [id number]    Assessment:  67 y/o M w/ hx of perforated transverse colon d/t ischemia, ex-lap, ileocolonic resection w/ primary anastomosis, diverting ileostomy, s/p ileostomy reversal    Plan:  Continue NGT today at least into afternoon - although BMs overnight he has still had over 3 5L of dark green output w/ distention  Continue IVF  Follow UP  AM labs and replete    Subjective/Objective     Subjective:   BMs overnight  Objective:     Blood pressure 98/58, pulse 88, temperature 98 8 °F (37 1 °C), temperature source Oral, resp  rate 18, height 5' 6" (1 676 m), weight 63 5 kg (140 lb), SpO2 95 %  ,Body mass index is 22 6 kg/m²  I/O       10/08 0701 - 10/09 0700 10/09 0701 - 10/10 0700    P  O  0 238    I V  (mL/kg) 1027 1 (16 2) 1930 8 (30 4)    IV Piggyback  100    Total Intake(mL/kg) 1027 1 (16 2) 2268 8 (35 7)    Urine (mL/kg/hr) 325 (0 2) 520 (0 3)    Emesis/NG output 4400     Stool 0     Total Output 4725 520    Net -3697 9 +1748 8          Unmeasured Urine Occurrence 1 x 1 x    Unmeasured Stool Occurrence  1 x            Invasive Devices     Peripheral Intravenous Line            Peripheral IV 10/09/18 Right Arm 2 days          Drain            NG/OG/Enteral Tube less than 1 day                Physical Exam:    Gen: NAD, AAOx3  NGT in place w/ dark green output  CV: RRR  Pulm: no resp distress  Abd: Soft, distended, non-tender      Lab, Imaging and other studies:CBC:   Lab Results   Component Value Date    WBC 10 28 (H) 10/11/2018    HGB 8 0 (L) 10/11/2018    HCT 26 4 (L) 10/11/2018    MCV 89 10/11/2018     10/11/2018    MCH 26 9 10/11/2018    MCHC 30 3 (L) 10/11/2018    RDW 15 7 (H) 10/11/2018    MPV 9 9 10/11/2018    NRBC 0 10/11/2018   , CMP:   Lab Results   Component Value Date     10/10/2018    K 3 7 10/10/2018    CL 99 (L) 10/10/2018    CO2 34 (H) 10/10/2018    BUN 12 10/10/2018    CREATININE 1 02 10/10/2018    CALCIUM 7 8 (L) 10/10/2018    EGFR 82 10/10/2018   , Coagulation: No results found for: PT, INR, APTT, Urinalysis: No results found for: COLORU, CLARITYU, SPECGRAV, PHUR, LEUKOCYTESUR, NITRITE, PROTEINUA, GLUCOSEU, KETONESU, BILIRUBINUR, BLOODU, Amylase: No results found for: AMYLASE, Lipase: No results found for: LIPASE  VTE Pharmacologic Prophylaxis: Heparin  VTE Mechanical Prophylaxis: sequential compression device

## 2018-10-11 NOTE — PLAN OF CARE
Problem: PHYSICAL THERAPY ADULT  Goal: Performs mobility at highest level of function for planned discharge setting  See evaluation for individualized goals  Treatment/Interventions: Functional transfer training, LE strengthening/ROM, Elevations, Therapeutic exercise, Endurance training, Patient/family training, Equipment eval/education, Bed mobility, Gait training, Spoke to nursing, Spoke to case management, Spoke to advanced practitioner  Equipment Recommended:  (RW)       See flowsheet documentation for full assessment, interventions and recommendations  Prognosis: Good  Problem List: Decreased strength, Decreased range of motion, Decreased endurance, Impaired balance, Decreased coordination, Impaired judgement, Decreased safety awareness, Impaired sensation, Decreased skin integrity, Pain  Assessment: Pt is 66 y o  male seen for PT evaluation s/p admit to [facility] on 10/5/2018  Pt w/ hx of perforated colon 2* ischemic bowel admitted for elective closure of ileostomy performed on 76/0 w/ post op complications of hypotension; NG to suction; low urine output/ vomiting  NG to suction; able to ambulate clamped  PT now consulted for assessment of mobility and d/c needs  PMhx and  personal factors currently affecting pt's physical performance include: extensive PMhx as above as well as environmental barriers; motivational barriers; max cues for limited participation; threatening to pul out NG tube; O2 at home; KAREN; hx of ETOH abuse; agitation; afib; DEBBY: cervical spinal stenosis; fall risk; heart failure    Prior to admission, pt reports living w/ family in a [facility] w/ 1st setup- pt reports he sleeps in a recliner and sponge baths and has 24/7 care by family- also was receiving home PT/ OT and RN PTA   Upon evaluation, pt currently is requiring S A for bed skills; S for functional transfers and S for ambulation w/ RW on 3 LO2- pt is impulsive ; agitated and can be self limiting w/ anger and focus on getting NG tube out and eating- PT spent considerable time educating pt - however largely unsuccessful (Rosanne Mena) and team aware  Pt presents functioning below baseline and currently w/ overall mobility deficits 2* to: decreased LE strength/AROM; limited flexibility;  generalized weakness/ deconditioning; decreased endurance; decreased activity tolerance; decreased coordination; impaired balance; gait deviations; decreased safety awareness; SOB/LOJA; fatigue; impaired safety and judgement; limited insight into current deficits; multiple lines; hearing impairment/ visual impairments; sensory impairment  Pt currently at risk for falls  (Please find additional objective findings from PT assessment regarding body systems outlined above ) Pt will continue to benefit from skilled PT interventions to address stated impairments; to maximize functional potential; for ongoing pt/ family training; and DME needs  PT is currently recommending home w/ family support; and home PT; use of RW as previous  Pt/ family agreeable to plan and goals as stated on evaluation  Recommendation: Home with family support, Home PT (use of RW)     PT - OK to Discharge:  (w/ home PT when medically cleared )    See flowsheet documentation for full assessment

## 2018-10-12 LAB
ANION GAP SERPL CALCULATED.3IONS-SCNC: 3 MMOL/L (ref 4–13)
BUN SERPL-MCNC: 8 MG/DL (ref 5–25)
CALCIUM SERPL-MCNC: 8.1 MG/DL (ref 8.3–10.1)
CHLORIDE SERPL-SCNC: 103 MMOL/L (ref 100–108)
CO2 SERPL-SCNC: 36 MMOL/L (ref 21–32)
CREAT SERPL-MCNC: 1.12 MG/DL (ref 0.6–1.3)
GFR SERPL CREATININE-BSD FRML MDRD: 73 ML/MIN/1.73SQ M
GLUCOSE SERPL-MCNC: 88 MG/DL (ref 65–140)
POTASSIUM SERPL-SCNC: 3.7 MMOL/L (ref 3.5–5.3)
SODIUM SERPL-SCNC: 142 MMOL/L (ref 136–145)

## 2018-10-12 PROCEDURE — 94760 N-INVAS EAR/PLS OXIMETRY 1: CPT

## 2018-10-12 PROCEDURE — 80048 BASIC METABOLIC PNL TOTAL CA: CPT | Performed by: PHYSICIAN ASSISTANT

## 2018-10-12 PROCEDURE — 94640 AIRWAY INHALATION TREATMENT: CPT

## 2018-10-12 PROCEDURE — 97166 OT EVAL MOD COMPLEX 45 MIN: CPT

## 2018-10-12 RX ORDER — OXYCODONE HYDROCHLORIDE AND ACETAMINOPHEN 5; 325 MG/1; MG/1
1 TABLET ORAL EVERY 4 HOURS PRN
Status: DISCONTINUED | OUTPATIENT
Start: 2018-10-12 | End: 2018-10-13

## 2018-10-12 RX ADMIN — KETOROLAC TROMETHAMINE 15 MG: 30 INJECTION, SOLUTION INTRAMUSCULAR at 23:54

## 2018-10-12 RX ADMIN — ASPIRIN 81 MG 81 MG: 81 TABLET ORAL at 08:30

## 2018-10-12 RX ADMIN — GABAPENTIN 400 MG: 400 CAPSULE ORAL at 08:30

## 2018-10-12 RX ADMIN — GABAPENTIN 400 MG: 400 CAPSULE ORAL at 17:15

## 2018-10-12 RX ADMIN — KETOROLAC TROMETHAMINE 15 MG: 30 INJECTION, SOLUTION INTRAMUSCULAR at 05:39

## 2018-10-12 RX ADMIN — TIOTROPIUM BROMIDE 18 MCG: 18 CAPSULE ORAL; RESPIRATORY (INHALATION) at 08:31

## 2018-10-12 RX ADMIN — DEXTROSE, SODIUM CHLORIDE, AND POTASSIUM CHLORIDE 125 ML/HR: 5; .45; .15 INJECTION INTRAVENOUS at 01:21

## 2018-10-12 RX ADMIN — METOCLOPRAMIDE 10 MG: 5 INJECTION, SOLUTION INTRAMUSCULAR; INTRAVENOUS at 05:39

## 2018-10-12 RX ADMIN — ALBUTEROL SULFATE 2.5 MG: 2.5 SOLUTION RESPIRATORY (INHALATION) at 13:16

## 2018-10-12 RX ADMIN — KETOROLAC TROMETHAMINE 15 MG: 30 INJECTION, SOLUTION INTRAMUSCULAR at 17:15

## 2018-10-12 RX ADMIN — METOCLOPRAMIDE 10 MG: 5 INJECTION, SOLUTION INTRAMUSCULAR; INTRAVENOUS at 11:40

## 2018-10-12 RX ADMIN — NICOTINE 1 PATCH: 7 PATCH, EXTENDED RELEASE TRANSDERMAL at 08:31

## 2018-10-12 RX ADMIN — GABAPENTIN 400 MG: 400 CAPSULE ORAL at 22:17

## 2018-10-12 RX ADMIN — DEXTROSE, SODIUM CHLORIDE, AND POTASSIUM CHLORIDE 84 ML/HR: 5; .45; .15 INJECTION INTRAVENOUS at 10:00

## 2018-10-12 RX ADMIN — AMIODARONE HYDROCHLORIDE 200 MG: 200 TABLET ORAL at 08:31

## 2018-10-12 RX ADMIN — HEPARIN SODIUM 5000 UNITS: 5000 INJECTION, SOLUTION INTRAVENOUS; SUBCUTANEOUS at 14:05

## 2018-10-12 RX ADMIN — HEPARIN SODIUM 5000 UNITS: 5000 INJECTION, SOLUTION INTRAVENOUS; SUBCUTANEOUS at 05:39

## 2018-10-12 RX ADMIN — DEXTROSE, SODIUM CHLORIDE, AND POTASSIUM CHLORIDE 84 ML/HR: 5; .45; .15 INJECTION INTRAVENOUS at 22:17

## 2018-10-12 RX ADMIN — FOLIC ACID 1000 MCG: 1 TABLET ORAL at 08:30

## 2018-10-12 RX ADMIN — KETOROLAC TROMETHAMINE 15 MG: 30 INJECTION, SOLUTION INTRAMUSCULAR at 11:40

## 2018-10-12 RX ADMIN — THIAMINE HYDROCHLORIDE 100 MG: 100 INJECTION, SOLUTION INTRAMUSCULAR; INTRAVENOUS at 08:29

## 2018-10-12 RX ADMIN — METOCLOPRAMIDE 10 MG: 5 INJECTION, SOLUTION INTRAMUSCULAR; INTRAVENOUS at 23:54

## 2018-10-12 RX ADMIN — HEPARIN SODIUM 5000 UNITS: 5000 INJECTION, SOLUTION INTRAVENOUS; SUBCUTANEOUS at 22:17

## 2018-10-12 NOTE — SOCIAL WORK
Cm reviewed patient during care coordination rounds with [facility]  Patient not stable for discharge today  Patient to complete 4 hour NGT clamp trial   NGT was removed  Patient's diet advanced to clears/toast   Dilaudid was discontinued  IVFs reduced  [facility] following for resumption of care for RN and home PT services  Patient has home 02  Cm following

## 2018-10-12 NOTE — PROGRESS NOTES
Progress Note - Colorectal Surgery   Demi Chen 66 y o  male MRN: [id number]  Unit/Bed#: [facility] 815-01 Encounter: [id number]    Assessment:  65 y/o M w/ hx of perforated transverse colon d/t ischemia, ex-lap, ileocolonic resection w/ primary anastomosis, diverting ileostomy, s/p ileostomy reversal, POD #7    Plan:  --NPO/NGT  --D5 1/2NS [email or web address]  --Pain control  --OOB, ambulate  --SQH, SCDs    Subjective/Objective     Subjective:     No acute events overnight  Pt c/o discomfort at NGT site, and he is threatening to pull it out  + bowel movements  Objective:     Blood pressure 117/58, pulse 94, temperature 98 5 °F (36 9 °C), temperature source Oral, resp  rate 18, height 5' 6" (1 676 m), weight 63 5 kg (140 lb), SpO2 92 %  ,Body mass index is 22 6 kg/m²  I/O       10/10 0701 - 10/11 0700 10/11 0701 - 10/12 0700    P  O  293 720    I V  (mL/kg) 2694 6 (42 4) 2177 1 (34 3)    NG/GT  150    Total Intake(mL/kg) 2987 6 (47) 3047 1 (48)    Urine (mL/kg/hr) 200 (0 1) 925 (0 6)    Emesis/NG output 3500 3200    Stool  0    Total Output 3700 4125    Net -712 4 -1077 9          Unmeasured Urine Occurrence 1 x     Unmeasured Stool Occurrence 4 x 1 x            Invasive Devices     Peripheral Intravenous Line            Peripheral IV 10/11/18 Right Arm less than 1 day          Drain            NG/OG/Enteral Tube 1 day                Physical Exam:     GEN: NAD  HEENT: MMM; NGT in place with dark green output  CV: RRR  Lung: normal effort  Ab: Soft, NT/ND  Extrem: No CCE  Neuro:  A+Ox3, motor and sensation grossly intact      Lab, Imaging and other studies:  CBC:   Lab Results   Component Value Date    WBC 10 28 (H) 10/11/2018    HGB 8 0 (L) 10/11/2018    HCT 26 4 (L) 10/11/2018    MCV 89 10/11/2018     10/11/2018    MCH 26 9 10/11/2018    MCHC 30 3 (L) 10/11/2018    RDW 15 7 (H) 10/11/2018    MPV 9 9 10/11/2018    NRBC 0 10/11/2018   , CMP:   Lab Results   Component Value Date     10/11/2018    K 3 9 10/11/2018     10/11/2018    CO2 35 (H) 10/11/2018    BUN 11 10/11/2018    CREATININE 0 98 10/11/2018    CALCIUM 7 7 (L) 10/11/2018    EGFR 86 10/11/2018   , Coagulation: No results found for: PT, INR, APTT, Urinalysis: No results found for: COLORU, CLARITYU, SPECGRAV, PHUR, LEUKOCYTESUR, NITRITE, PROTEINUA, GLUCOSEU, KETONESU, BILIRUBINUR, BLOODU, Amylase: No results found for: AMYLASE, Lipase: No results found for: LIPASE  VTE Pharmacologic Prophylaxis: Heparin  VTE Mechanical Prophylaxis: sequential compression device

## 2018-10-12 NOTE — PROGRESS NOTES
Patient had 4 hour NGT clamp trial ,  0 residual  NGT removed; clears/toast/coffee ordered    Dilaudid discontinued, toradol for 24 more hours, Percocet ordered    IV fluids reduced to 84 ml/hr

## 2018-10-12 NOTE — OCCUPATIONAL THERAPY NOTE
[address]  Evaluation     Patient Name: Mike Tanner  Today's Date: 10/12/2018  Problem List  Patient Active Problem List   Diagnosis    Crohn disease ([address] )    Spinal stenosis of cervical region    Hypertension    Pericarditis    Coronary artery disease involving native coronary artery of native heart without angina pectoris    Asthma    Atherosclerosis of coronary artery    [facility]) [facility])    Dyslipidemia    Alcohol abuse    Cigarette nicotine dependence without complication    Alcohol dependence with withdrawal ([address] )    Acute respiratory insufficiency    Agitation    Heart failure, systolic, due to idiopathic cardiomyopathy ([address] )    Ileostomy in place ([address] )    Moderate protein-calorie malnutrition (HCC)    Anemia    Hypocalcemia    Perforation of colon ([address] )    Hypotension    Intra-abdominal abscess ([address] )    DEBBY (acute kidney injury) ([address] )    Abdominal pain    Ambulatory dysfunction    Presence of drug-eluting stent in right coronary artery    Paroxysmal atrial fibrillation (HCC)    Hypercalcemia    Hematemesis with nausea     Past Medical History  Past Medical History:   Diagnosis Date    Anxiety     Asthma     Cardiac disease     Cervical stenosis (uterine cervix)     Chest pain     Chronic kidney disease     Colitis     Colon polyps     COPD (chronic obstructive pulmonary disease) (HCC)     2L @ HS and PRN    Coronary artery disease     Diverticulitis     Esophageal reflux     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HCC)     Hyperlipidemia     Hypertension     IBD (inflammatory bowel disease)     Myocardial infarction ([address] )     Old myocardial infarction     Perforation of colon ([facility])     Type 2 diabetes, diet controlled ([address] )     Ulcerative colitis ([address] )      Past Surgical History  Past Surgical History:   Procedure Laterality Date    ANGIOPLASTY      APPENDECTOMY      COLON SURGERY      COLONOSCOPY N/A 10/24/2016 Procedure: COLONOSCOPY;  Surgeon: Shaina Quispe MD;  [facility]; Service:     CORONARY ANGIOPLASTY WITH STENT PLACEMENT      ESOPHAGOGASTRODUODENOSCOPY N/A 10/24/2016    Procedure: ESOPHAGOGASTRODUODENOSCOPY (EGD); Surgeon: Shaina Quispe MD;  [facility]; Service:     EXPLORATORY LAPAROTOMY W/ BOWEL RESECTION N/A 5/22/2018    Procedure: EXPLORATORY LAPAROTOMY, ILIOCOLECTOMY, ILIOCOLONIC ANASTAMOSIS, LOOP ILIOSTOMY, REPAIR OF SEROSAL TEAR, EXTENSIVE LYSIS OF ADHESIONS, WOUND VAC PLACEMENT;  Surgeon: Shaina Quispe MD;  [facility];  Service: Colorectal    HEMICOLECTOMY      ILEOSTOMY CLOSURE N/A 10/5/2018    Procedure: Stephane Ao;  Surgeon: Shaina Quispe MD;  [facility];  Service: Colorectal    OTHER SURGICAL HISTORY      stent indications acute myocardial infarction    OK COLONOSCOPY FLX DX W/COLLJ SPEC WHEN PFRMD N/A 2/6/2018    Procedure: COLONOSCOPY;  Surgeon: Shaina Quispe MD;  [facility]; Service: Colorectal    OK COLONOSCOPY FLX DX W/COLLJ SPEC WHEN PFRMD N/A 10/4/2018    Procedure: COLONOSCOPY;  Surgeon: Shaina Quispe MD;  [facility]; Service: Colorectal    TONSILLECTOMY           10/12/18 1512   Note Type   Note type Eval only   Restrictions/Precautions   Weight Bearing Precautions Per Order No   Other Precautions Impulsive;O2;Fall Risk   Pain Assessment   Pain Assessment 0-10   Pain Score 7   Pain Type Acute pain   Pain Location Abdomen   Hospital Pain Intervention(s) Ambulation/increased activity; Emotional support;Repositioned   Response to Interventions decreased pain with rest   Home Living   Type of [address] [place] [address] Avenue; Able to live on main level with bedroom/bathroom   Bathroom Shower/Tub Walk-in shower   Bathroom Toilet Standard   Bathroom Accessibility Accessible   Home Equipment Walker;Cane;Other (Comment)  (rollator)   Additional Comments pt reports he sits in tub to bathe, recommended  tub/shower to increase safety  Prior Function   Level of [place] Independent with ADLs and functional mobility; Needs assistance with IADLs   Lives With Family;Daughter; Other (Comment)  (grand kids)   Receives Help From Family   ADL Assistance Independent   IADLs Needs assistance   Lifestyle   Autonomy I with ADLs, drives cooks, family shares homemaking tasks   Reciprocal Relationships daughter, grandkitatiana   Service to Others retired   Intrinsic Gratification cooking   Psychosocial   Psychosocial (WDL) WDL   ADL   Where Assessed Edge of bed   [address] 7  Independent   Grooming Assistance 6  Modified Independent   [address] 6  Modified Independent   LB [address] 6  Modified Independent   [address] 6  Modified independent   [address] 6  Modified independent   [address]  6  Modified independent   [address] bar use   Additional Comments pt was Mod I with LB dressing tasks while seated at edge of be-donning/doffing socks with modiffication of simulated stool for increase I, safety, and decreased pain to abdomen     Bed Mobility   Supine to Sit 6  Modified independent   Additional items Increased time required   Transfers   Sit to Stand 6  Modified independent   Stand to Sit 6  Modified independent   Toilet transfer 6  Modified independent   Additional items Increased time required  (use of IV pole/as SPC)   Functional Mobility   Functional Mobility 6  Modified independent   Additional Comments (min impulsive , suspect impulsive at baseline)   Additional items (IV pole as SPC, short room mobility)   Balance   Static Sitting Good   Dynamic Sitting Fair +   Static Standing Fair -   Dynamic Standing Fair +  (picked up lt item from floor with S and good safety)   Ambulatory Fair  (pt used IV pole as SPC)   RUE Assessment   RUE Assessment WFL   LUE Assessment   LUE Assessment WFL   Hand Function   Gross Motor Coordination Functional   Fine Motor Coordination (tremor L>R, pt reports does not interfere with ADL's)   Vision-Basic Assessment   Current Vision Wears glasses only for reading   Vision - Complex Assessment   Acuity Able to read clock/calendar on wall without difficulty; Able to read employee name badge without difficulty   Cognition   Overall Cognitive Status WFL  (min impulsive, suspect similar to baseline)   Arousal/Participation Alert; Cooperative   Attention Within functional limits   Memory Within functional limits   Following Commands Follows all commands and directions without difficulty   Comments pt reports he feels "like a different person" now that the Ng tube has been removed  Assessment   Limitation Decreased high-level ADLs; Decreased endurance   Prognosis Fair  (with O2)   Assessment Pt is a 66 y o  male who was [facility] to [facility] on 10/5/2018 with  Ileostomy in place [facility]) s/p ileostomy reversal on 10/5/18   Pt's problem list also includes PMH of aniexty,cardiac disease, CKD, colitis, COPD on home 02, CAD, MI, DM  At baseline pt was completing ADL's with I, shares IADLS tasks with family, drives, enjoys cooking  Pt lives in a multilevel house with first floor set-up has been sleeping in recliner, tub/shower on 2nd has been sponge bathing, enjoys cooking  Currently pt requires mod I for overall ADLS and mod I for functional mobility for short room mobility, transfers with AD and increased time, reviewed energy conservation techniques, pt receptive   Pt currently presents with impairments in the following categories -difficulty performing IADLS  activity tolerance and endurance  These impairments, as well as pt's fatigue and pain  limit pt's ability to safely engage in all baseline areas of occupation, includingbathing, community mobility, house maintenance and meal prep From OT standpoint, recommend home with family support and use of DME for increase safety and I with all ADL's  upon D/C  Recommend continued oob for meals, ambulation to/from BR, setup for self care tasks and mobility in hallway with nursing/restorative - d/c from caseload     Goals   Patient Goals go home, "I feel better now"   Recommendation   OT Discharge Recommendation Home with family support   Equipment Recommended (sponge bathe, tub seat, avoid sitting in tub bathes)   OT - OK to Discharge (yes, when medically able)   [place] Index   Feeding 10   Bathing 5   Grooming Score 5   Dressing Score 10   Bladder Score 10   Bowels Score 10   Toilet Use Score 10   Transfers (Bed/Chair) Score 15   Mobility (Level Surface) Score 0   Modified [place] Scale   Modified [place] Scale 3       Liz Stover, OT

## 2018-10-13 LAB
ANION GAP SERPL CALCULATED.3IONS-SCNC: 4 MMOL/L (ref 4–13)
ANION GAP SERPL CALCULATED.3IONS-SCNC: 4 MMOL/L (ref 4–13)
BASOPHILS # BLD AUTO: 0.03 THOUSANDS/ΜL (ref 0–0.1)
BASOPHILS NFR BLD AUTO: 0 % (ref 0–1)
BILIRUB UR QL STRIP: NEGATIVE
BUN SERPL-MCNC: 7 MG/DL (ref 5–25)
BUN SERPL-MCNC: 8 MG/DL (ref 5–25)
CALCIUM SERPL-MCNC: 7.7 MG/DL (ref 8.3–10.1)
CALCIUM SERPL-MCNC: 7.7 MG/DL (ref 8.3–10.1)
CHLORIDE SERPL-SCNC: 104 MMOL/L (ref 100–108)
CHLORIDE SERPL-SCNC: 107 MMOL/L (ref 100–108)
CLARITY UR: CLEAR
CO2 SERPL-SCNC: 29 MMOL/L (ref 21–32)
CO2 SERPL-SCNC: 30 MMOL/L (ref 21–32)
COLOR UR: YELLOW
CREAT SERPL-MCNC: 1.15 MG/DL (ref 0.6–1.3)
CREAT SERPL-MCNC: 1.25 MG/DL (ref 0.6–1.3)
CREAT UR-MCNC: 57.6 MG/DL
EOSINOPHIL # BLD AUTO: 0.25 THOUSAND/ΜL (ref 0–0.61)
EOSINOPHIL NFR BLD AUTO: 2 % (ref 0–6)
ERYTHROCYTE [DISTWIDTH] IN BLOOD BY AUTOMATED COUNT: 15.8 % (ref 11.6–15.1)
GFR SERPL CREATININE-BSD FRML MDRD: 64 ML/MIN/1.73SQ M
GFR SERPL CREATININE-BSD FRML MDRD: 71 ML/MIN/1.73SQ M
GLUCOSE SERPL-MCNC: 102 MG/DL (ref 65–140)
GLUCOSE SERPL-MCNC: 96 MG/DL (ref 65–140)
GLUCOSE UR STRIP-MCNC: NEGATIVE MG/DL
HCT VFR BLD AUTO: 25.6 % (ref 36.5–49.3)
HGB BLD-MCNC: 7.7 G/DL (ref 12–17)
HGB UR QL STRIP.AUTO: NEGATIVE
IMM GRANULOCYTES # BLD AUTO: 0.08 THOUSAND/UL (ref 0–0.2)
IMM GRANULOCYTES NFR BLD AUTO: 1 % (ref 0–2)
KETONES UR STRIP-MCNC: NEGATIVE MG/DL
LEUKOCYTE ESTERASE UR QL STRIP: NEGATIVE
LYMPHOCYTES # BLD AUTO: 2.34 THOUSANDS/ΜL (ref 0.6–4.47)
LYMPHOCYTES NFR BLD AUTO: 16 % (ref 14–44)
MCH RBC QN AUTO: 26.5 PG (ref 26.8–34.3)
MCHC RBC AUTO-ENTMCNC: 30.1 G/DL (ref 31.4–37.4)
MCV RBC AUTO: 88 FL (ref 82–98)
MONOCYTES # BLD AUTO: 1.7 THOUSAND/ΜL (ref 0.17–1.22)
MONOCYTES NFR BLD AUTO: 11 % (ref 4–12)
NEUTROPHILS # BLD AUTO: 10.5 THOUSANDS/ΜL (ref 1.85–7.62)
NEUTS SEG NFR BLD AUTO: 70 % (ref 43–75)
NITRITE UR QL STRIP: NEGATIVE
NRBC BLD AUTO-RTO: 0 /100 WBCS
PH UR STRIP.AUTO: 8.5 [PH] (ref 4.5–8)
PLATELET # BLD AUTO: 269 THOUSANDS/UL (ref 149–390)
PLATELET # BLD AUTO: 272 THOUSANDS/UL (ref 149–390)
PMV BLD AUTO: 9.2 FL (ref 8.9–12.7)
PMV BLD AUTO: 9.7 FL (ref 8.9–12.7)
POTASSIUM SERPL-SCNC: 3.9 MMOL/L (ref 3.5–5.3)
POTASSIUM SERPL-SCNC: 4.2 MMOL/L (ref 3.5–5.3)
PROT UR STRIP-MCNC: NEGATIVE MG/DL
RBC # BLD AUTO: 2.91 MILLION/UL (ref 3.88–5.62)
SODIUM 24H UR-SCNC: 170 MOL/L
SODIUM SERPL-SCNC: 138 MMOL/L (ref 136–145)
SODIUM SERPL-SCNC: 140 MMOL/L (ref 136–145)
SP GR UR STRIP.AUTO: 1.01 (ref 1–1.03)
UROBILINOGEN UR QL STRIP.AUTO: 0.2 E.U./DL
WBC # BLD AUTO: 14.9 THOUSAND/UL (ref 4.31–10.16)

## 2018-10-13 PROCEDURE — 81003 URINALYSIS AUTO W/O SCOPE: CPT | Performed by: STUDENT IN AN ORGANIZED HEALTH CARE EDUCATION/TRAINING PROGRAM

## 2018-10-13 PROCEDURE — 85049 AUTOMATED PLATELET COUNT: CPT | Performed by: SURGERY

## 2018-10-13 PROCEDURE — 85025 COMPLETE CBC W/AUTO DIFF WBC: CPT | Performed by: PHYSICIAN ASSISTANT

## 2018-10-13 PROCEDURE — 80048 BASIC METABOLIC PNL TOTAL CA: CPT | Performed by: PHYSICIAN ASSISTANT

## 2018-10-13 PROCEDURE — 82570 ASSAY OF URINE CREATININE: CPT | Performed by: STUDENT IN AN ORGANIZED HEALTH CARE EDUCATION/TRAINING PROGRAM

## 2018-10-13 PROCEDURE — 84300 ASSAY OF URINE SODIUM: CPT | Performed by: STUDENT IN AN ORGANIZED HEALTH CARE EDUCATION/TRAINING PROGRAM

## 2018-10-13 PROCEDURE — 80048 BASIC METABOLIC PNL TOTAL CA: CPT | Performed by: STUDENT IN AN ORGANIZED HEALTH CARE EDUCATION/TRAINING PROGRAM

## 2018-10-13 RX ORDER — OXYCODONE HYDROCHLORIDE AND ACETAMINOPHEN 5; 325 MG/1; MG/1
1 TABLET ORAL EVERY 4 HOURS PRN
Status: DISCONTINUED | OUTPATIENT
Start: 2018-10-13 | End: 2018-10-15 | Stop reason: HOSPADM

## 2018-10-13 RX ORDER — POTASSIUM CHLORIDE 20 MEQ/1
20 TABLET, EXTENDED RELEASE ORAL ONCE
Status: COMPLETED | OUTPATIENT
Start: 2018-10-13 | End: 2018-10-13

## 2018-10-13 RX ADMIN — AMIODARONE HYDROCHLORIDE 200 MG: 200 TABLET ORAL at 09:01

## 2018-10-13 RX ADMIN — ASPIRIN 81 MG 81 MG: 81 TABLET ORAL at 09:01

## 2018-10-13 RX ADMIN — OXYCODONE HYDROCHLORIDE AND ACETAMINOPHEN 1 TABLET: 5; 325 TABLET ORAL at 11:50

## 2018-10-13 RX ADMIN — THIAMINE HYDROCHLORIDE 100 MG: 100 INJECTION, SOLUTION INTRAMUSCULAR; INTRAVENOUS at 09:05

## 2018-10-13 RX ADMIN — OXYCODONE HYDROCHLORIDE AND ACETAMINOPHEN 1 TABLET: 5; 325 TABLET ORAL at 19:51

## 2018-10-13 RX ADMIN — HEPARIN SODIUM 5000 UNITS: 5000 INJECTION, SOLUTION INTRAVENOUS; SUBCUTANEOUS at 05:21

## 2018-10-13 RX ADMIN — METOCLOPRAMIDE 10 MG: 5 INJECTION, SOLUTION INTRAMUSCULAR; INTRAVENOUS at 05:21

## 2018-10-13 RX ADMIN — GABAPENTIN 400 MG: 400 CAPSULE ORAL at 21:15

## 2018-10-13 RX ADMIN — SODIUM CHLORIDE 500 ML: 0.9 INJECTION, SOLUTION INTRAVENOUS at 10:25

## 2018-10-13 RX ADMIN — POTASSIUM CHLORIDE 20 MEQ: 1500 TABLET, EXTENDED RELEASE ORAL at 10:27

## 2018-10-13 RX ADMIN — DEXTROSE, SODIUM CHLORIDE, AND POTASSIUM CHLORIDE 84 ML/HR: 5; .45; .15 INJECTION INTRAVENOUS at 10:24

## 2018-10-13 RX ADMIN — OXYCODONE HYDROCHLORIDE AND ACETAMINOPHEN 1 TABLET: 5; 325 TABLET ORAL at 07:48

## 2018-10-13 RX ADMIN — OXYCODONE HYDROCHLORIDE AND ACETAMINOPHEN 1 TABLET: 5; 325 TABLET ORAL at 15:47

## 2018-10-13 RX ADMIN — FOLIC ACID 1000 MCG: 1 TABLET ORAL at 09:01

## 2018-10-13 RX ADMIN — GABAPENTIN 400 MG: 400 CAPSULE ORAL at 09:01

## 2018-10-13 RX ADMIN — HEPARIN SODIUM 5000 UNITS: 5000 INJECTION, SOLUTION INTRAVENOUS; SUBCUTANEOUS at 13:12

## 2018-10-13 RX ADMIN — NICOTINE 1 PATCH: 7 PATCH, EXTENDED RELEASE TRANSDERMAL at 09:05

## 2018-10-13 RX ADMIN — HEPARIN SODIUM 5000 UNITS: 5000 INJECTION, SOLUTION INTRAVENOUS; SUBCUTANEOUS at 21:15

## 2018-10-13 RX ADMIN — TIOTROPIUM BROMIDE 18 MCG: 18 CAPSULE ORAL; RESPIRATORY (INHALATION) at 09:01

## 2018-10-13 RX ADMIN — GABAPENTIN 400 MG: 400 CAPSULE ORAL at 15:47

## 2018-10-13 NOTE — PLAN OF CARE
DISCHARGE PLANNING     Discharge to home or other facility with appropriate resources Progressing        DISCHARGE PLANNING - CARE MANAGEMENT     Discharge to post-acute care or home with appropriate resources Progressing        GASTROINTESTINAL - ADULT     Maintains or returns to baseline bowel function Progressing     Maintains adequate nutritional intake Progressing        INFECTION - ADULT     Absence or prevention of progression during hospitalization Progressing        Knowledge Deficit     Patient/family/caregiver demonstrates understanding of disease process, treatment plan, medications, and discharge instructions Progressing        Nutrition/Hydration-ADULT     Nutrient/Hydration intake appropriate for improving, restoring or maintaining nutritional needs Progressing        PAIN - ADULT     Verbalizes/displays adequate comfort level or baseline comfort level Progressing        Potential for Falls     Patient will remain free of falls Progressing        SAFETY ADULT     Maintain or return to baseline ADL function Progressing     Maintain or return mobility status to optimal level Progressing

## 2018-10-13 NOTE — PROGRESS NOTES
Colorectal Surgery    Progress Note - Gail Muse 1960, 66 y o  male MRN: [id number]    Unit/Bed#: [facility] 815-01 Encounter: [id number]    Primary Care Provider: Orquidea Anguiano MD   Date and time admitted to hospital: 10/5/2018  9:05 AM      * Ileostomy in place [facility])   Assessment & Plan    S/p open ileostomy reversal with small bowel resection 10/5  Advance diet to regular  Continue to monitor UO         Subjective/Objective   Subjective: Tolerating diet with bowel function  Objective:     Blood pressure 102/64, pulse 76, temperature 98 6 °F (37 °C), temperature source Oral, resp  rate 18, height 5' 6" (1 676 m), weight 63 5 kg (140 lb), SpO2 94 %  ,Body mass index is 22 6 kg/m²  Intake/Output Summary (Last 24 hours) at 10/13/18 0930  Last data filed at 10/13/18 9165   Gross per 24 hour   Intake          3051 65 ml   Output                0 ml   Net          3051 65 ml       Invasive Devices     Peripheral Intravenous Line            Peripheral IV 10/11/18 Right Arm 1 day                Physical Exam:     Gen: NAD, AAOx3  CV: RRR  Pulm: no resp distress  Abd: Soft, non-distended, non-tender      Lab, Imaging and other studies:I have personally reviewed pertinent lab results      VTE Pharmacologic Prophylaxis: Heparin  VTE Mechanical Prophylaxis: sequential compression device

## 2018-10-14 ENCOUNTER — APPOINTMENT (INPATIENT)
Dept: RADIOLOGY | Facility: HOSPITAL | Age: 58
DRG: 230 | End: 2018-10-14
Payer: COMMERCIAL

## 2018-10-14 LAB
ANION GAP SERPL CALCULATED.3IONS-SCNC: 8 MMOL/L (ref 4–13)
BASOPHILS # BLD AUTO: 0.02 THOUSANDS/ΜL (ref 0–0.1)
BASOPHILS NFR BLD AUTO: 0 % (ref 0–1)
BUN SERPL-MCNC: 6 MG/DL (ref 5–25)
CALCIUM SERPL-MCNC: 8 MG/DL (ref 8.3–10.1)
CHLORIDE SERPL-SCNC: 107 MMOL/L (ref 100–108)
CO2 SERPL-SCNC: 25 MMOL/L (ref 21–32)
CREAT SERPL-MCNC: 1.24 MG/DL (ref 0.6–1.3)
EOSINOPHIL # BLD AUTO: 1.04 THOUSAND/ΜL (ref 0–0.61)
EOSINOPHIL NFR BLD AUTO: 7 % (ref 0–6)
ERYTHROCYTE [DISTWIDTH] IN BLOOD BY AUTOMATED COUNT: 15.6 % (ref 11.6–15.1)
GFR SERPL CREATININE-BSD FRML MDRD: 65 ML/MIN/1.73SQ M
GLUCOSE SERPL-MCNC: 82 MG/DL (ref 65–140)
HCT VFR BLD AUTO: 28.2 % (ref 36.5–49.3)
HGB BLD-MCNC: 8.6 G/DL (ref 12–17)
IMM GRANULOCYTES # BLD AUTO: 0.06 THOUSAND/UL (ref 0–0.2)
IMM GRANULOCYTES NFR BLD AUTO: 0 % (ref 0–2)
LYMPHOCYTES # BLD AUTO: 2.72 THOUSANDS/ΜL (ref 0.6–4.47)
LYMPHOCYTES NFR BLD AUTO: 19 % (ref 14–44)
MCH RBC QN AUTO: 26.5 PG (ref 26.8–34.3)
MCHC RBC AUTO-ENTMCNC: 30.5 G/DL (ref 31.4–37.4)
MCV RBC AUTO: 87 FL (ref 82–98)
MONOCYTES # BLD AUTO: 1.45 THOUSAND/ΜL (ref 0.17–1.22)
MONOCYTES NFR BLD AUTO: 10 % (ref 4–12)
NEUTROPHILS # BLD AUTO: 8.99 THOUSANDS/ΜL (ref 1.85–7.62)
NEUTS SEG NFR BLD AUTO: 64 % (ref 43–75)
NRBC BLD AUTO-RTO: 0 /100 WBCS
PLATELET # BLD AUTO: 296 THOUSANDS/UL (ref 149–390)
PMV BLD AUTO: 9.4 FL (ref 8.9–12.7)
POTASSIUM SERPL-SCNC: 4.3 MMOL/L (ref 3.5–5.3)
RBC # BLD AUTO: 3.24 MILLION/UL (ref 3.88–5.62)
SODIUM SERPL-SCNC: 140 MMOL/L (ref 136–145)
WBC # BLD AUTO: 14.28 THOUSAND/UL (ref 4.31–10.16)

## 2018-10-14 PROCEDURE — 94760 N-INVAS EAR/PLS OXIMETRY 1: CPT

## 2018-10-14 PROCEDURE — 85025 COMPLETE CBC W/AUTO DIFF WBC: CPT | Performed by: STUDENT IN AN ORGANIZED HEALTH CARE EDUCATION/TRAINING PROGRAM

## 2018-10-14 PROCEDURE — 80048 BASIC METABOLIC PNL TOTAL CA: CPT | Performed by: STUDENT IN AN ORGANIZED HEALTH CARE EDUCATION/TRAINING PROGRAM

## 2018-10-14 PROCEDURE — 99253 IP/OBS CNSLTJ NEW/EST LOW 45: CPT | Performed by: INTERNAL MEDICINE

## 2018-10-14 PROCEDURE — 76770 US EXAM ABDO BACK WALL COMP: CPT

## 2018-10-14 RX ORDER — THIAMINE MONONITRATE (VIT B1) 100 MG
100 TABLET ORAL DAILY
Status: DISCONTINUED | OUTPATIENT
Start: 2018-10-14 | End: 2018-10-15 | Stop reason: HOSPADM

## 2018-10-14 RX ADMIN — HEPARIN SODIUM 5000 UNITS: 5000 INJECTION, SOLUTION INTRAVENOUS; SUBCUTANEOUS at 13:54

## 2018-10-14 RX ADMIN — ASPIRIN 81 MG 81 MG: 81 TABLET ORAL at 09:07

## 2018-10-14 RX ADMIN — OXYCODONE HYDROCHLORIDE AND ACETAMINOPHEN 1 TABLET: 5; 325 TABLET ORAL at 00:45

## 2018-10-14 RX ADMIN — TIOTROPIUM BROMIDE 18 MCG: 18 CAPSULE ORAL; RESPIRATORY (INHALATION) at 09:07

## 2018-10-14 RX ADMIN — GABAPENTIN 400 MG: 400 CAPSULE ORAL at 09:07

## 2018-10-14 RX ADMIN — OXYCODONE HYDROCHLORIDE AND ACETAMINOPHEN 1 TABLET: 5; 325 TABLET ORAL at 22:09

## 2018-10-14 RX ADMIN — AMIODARONE HYDROCHLORIDE 200 MG: 200 TABLET ORAL at 09:07

## 2018-10-14 RX ADMIN — GABAPENTIN 400 MG: 400 CAPSULE ORAL at 21:08

## 2018-10-14 RX ADMIN — OXYCODONE HYDROCHLORIDE AND ACETAMINOPHEN 1 TABLET: 5; 325 TABLET ORAL at 05:10

## 2018-10-14 RX ADMIN — OXYCODONE HYDROCHLORIDE AND ACETAMINOPHEN 1 TABLET: 5; 325 TABLET ORAL at 18:07

## 2018-10-14 RX ADMIN — OXYCODONE HYDROCHLORIDE AND ACETAMINOPHEN 1 TABLET: 5; 325 TABLET ORAL at 09:06

## 2018-10-14 RX ADMIN — THIAMINE HCL TAB 100 MG 100 MG: 100 TAB at 10:48

## 2018-10-14 RX ADMIN — HEPARIN SODIUM 5000 UNITS: 5000 INJECTION, SOLUTION INTRAVENOUS; SUBCUTANEOUS at 05:11

## 2018-10-14 RX ADMIN — OXYCODONE HYDROCHLORIDE AND ACETAMINOPHEN 1 TABLET: 5; 325 TABLET ORAL at 13:54

## 2018-10-14 RX ADMIN — GABAPENTIN 400 MG: 400 CAPSULE ORAL at 18:07

## 2018-10-14 RX ADMIN — FOLIC ACID 1000 MCG: 1 TABLET ORAL at 09:07

## 2018-10-14 RX ADMIN — NICOTINE 1 PATCH: 7 PATCH, EXTENDED RELEASE TRANSDERMAL at 09:10

## 2018-10-14 RX ADMIN — HEPARIN SODIUM 5000 UNITS: 5000 INJECTION, SOLUTION INTRAVENOUS; SUBCUTANEOUS at 21:07

## 2018-10-14 RX ADMIN — DEXTROSE, SODIUM CHLORIDE, AND POTASSIUM CHLORIDE 84 ML/HR: 5; .45; .15 INJECTION INTRAVENOUS at 00:45

## 2018-10-14 NOTE — CONSULTS
Consultation - Nephrology   Jocelyn Wilson 66 y o  male MRN: [id number]  Unit/Bed#: [facility] 815-01 Encounter: [id number]      Assessment/Plan:  1  Acute kidney injury, overall resolved, likely secondary to prerenal azotemia, possible component of ATN  2  Hyponatremia, resolved  3  Status post open ileostomy with reversal in small-bowel obstruction  4  History of perforated transverse colon secondary to ischemia with diverting ileostomy  5  Labile blood pressures, continue to hold ACE-inhibitor    Plan:  · Overall renal function again seems to have plateaued at 1 2  · Continue to hold ACE-inhibitor given patient's labile blood pressure  · Urine output is improved, Urinalysis bland  · Recommend repeating laboratory studies, BMP in 1-2 weeks post discharge, stable for discharge from Nephrology standpoint    History of Present Illness   Physician Requesting Consult: Hilda Yi MD  Reason for Consult / Principal Problem:  Acute kidney injury  HPI: Jocelyn Wilson is a 66y o  year old male who presents with reversal of ileostomy  Patient is a 68-year-old male who presented for ileostomy reversal   Presentation creatinine was noted to be 1 78 as well as a sodium level of 117  With IV fluids, as creatinine improved to 1 13 with an improvement in sodium level of 132  He did have episodes of oliguria but responded to IV saline boluses  Creatinine seems to have plateaued at 1 2  Urinalysis is bland  Urine output is improved  Blood pressure appears stable previously in the 90s  Currently states that his appetite is normal   Urinating without difficulty  States he has had numerous bowel movements  No chest pain or shortness of breath  Denies any dizziness or lightheadedness  Pain seems to be under good control    History obtained from chart review and the patient    Review of Systems    Review of Systems: pertinent findings as noted above otherwise negative    Historical Information   Patient Active Problem List   Diagnosis    Crohn disease ([place] [address] )    Spinal stenosis of cervical region    Hypertension    Pericarditis    Coronary artery disease involving native coronary artery of native heart without angina pectoris    Asthma    Atherosclerosis of coronary artery    [facility]    Dyslipidemia    Alcohol abuse    Cigarette nicotine dependence without complication    Alcohol dependence with withdrawal ([facility])    Acute respiratory insufficiency    Agitation    Heart failure, systolic, due to idiopathic cardiomyopathy ([place] [address] )    Ileostomy in place ([place] [address] )    Moderate protein-calorie malnutrition ([facility])    Anemia    Hypocalcemia    Perforation of colon ([facility])    Hypotension    Intra-abdominal abscess ([facility])    DEBBY (acute kidney injury) ([place] [address] )    Abdominal pain    Ambulatory dysfunction    Presence of drug-eluting stent in right coronary artery    Paroxysmal atrial fibrillation ([facility])    Hypercalcemia    Hematemesis with nausea     Past Medical History:   Diagnosis Date    Anxiety     Asthma     Cardiac disease     Cervical stenosis (uterine cervix)     Chest pain     Chronic kidney disease     Colitis     Colon polyps     COPD (chronic obstructive pulmonary disease) ([facility])     2L @ HS and PRN    Coronary artery disease     Diverticulitis     Esophageal reflux     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HCC)     Hyperlipidemia     Hypertension     IBD (inflammatory bowel disease)     Myocardial infarction ([place] [address] )     Old myocardial infarction     Perforation of colon ([facility])     Type 2 diabetes, diet controlled ([place] [address] )     Ulcerative colitis ([place] [address] )      Past Surgical History:   Procedure Laterality Date    ANGIOPLASTY      APPENDECTOMY      COLON SURGERY      COLONOSCOPY N/A 10/24/2016    Procedure: COLONOSCOPY;  Surgeon: Catheline Favre, MD;  [facility];   Service:     CORONARY ANGIOPLASTY WITH STENT PLACEMENT      ESOPHAGOGASTRODUODENOSCOPY N/A 10/24/2016 Procedure: ESOPHAGOGASTRODUODENOSCOPY (EGD); Surgeon: Shaina Quispe MD;  [facility]; Service:     EXPLORATORY LAPAROTOMY W/ BOWEL RESECTION N/A 5/22/2018    Procedure: EXPLORATORY LAPAROTOMY, ILIOCOLECTOMY, ILIOCOLONIC ANASTAMOSIS, LOOP ILIOSTOMY, REPAIR OF SEROSAL TEAR, EXTENSIVE LYSIS OF ADHESIONS, WOUND VAC PLACEMENT;  Surgeon: Shaina Quispe MD;  [facility];  Service: Colorectal    HEMICOLECTOMY      ILEOSTOMY CLOSURE N/A 10/5/2018    Procedure: Donzella Ao;  Surgeon: Shaina Quispe MD;  [facility];  Service: Colorectal    OTHER SURGICAL HISTORY      stent indications acute myocardial infarction    MD COLONOSCOPY FLX DX W/COLLJ SPEC WHEN PFRMD N/A 2/6/2018    Procedure: COLONOSCOPY;  Surgeon: Shaina Quispe MD;  [facility]; Service: Colorectal    MD COLONOSCOPY FLX DX W/COLLJ SPEC WHEN PFRMD N/A 10/4/2018    Procedure: COLONOSCOPY;  Surgeon: Shaina Quispe MD;  [facility];   Service: Colorectal    TONSILLECTOMY       Social History   History   Alcohol Use No     Comment: pt "quit about 4 months ago"     History   Drug Use No     History   Smoking Status    Current Every Day Smoker    Packs/day: 3 00    Years: 40 00    Types: Cigars   Smokeless Tobacco    Never Used     Comment: 3 cigars per day     Family History   Problem Relation Age of Onset    Coronary artery disease Father     Crohn's disease Father     Stroke Father     Diabetes Family        Meds/Allergies   current meds:   Current Facility-Administered Medications   Medication Dose Route Frequency    albuterol inhalation solution 2 5 mg  2 5 mg Nebulization Q4H PRN    amiodarone tablet 200 mg  200 mg Oral Daily    aspirin chewable tablet 81 mg  81 mg Oral Daily    bisacodyl (DULCOLAX) rectal suppository 10 mg  10 mg Rectal Daily    folic acid (FOLVITE) tablet 1,000 mcg  1,000 mcg Oral Daily    gabapentin (NEURONTIN) capsule 400 mg  400 mg Per NG Tube TID  heparin (porcine) subcutaneous injection 5,000 Units  5,000 Units Subcutaneous Q8H [facility]    nicotine (NICODERM CQ) 7 mg/24hr TD 24 hr patch 1 patch  1 patch Transdermal Daily    ondansetron (ZOFRAN) injection 4 mg  4 mg Intravenous Q6H PRN    oxyCODONE-acetaminophen (PERCOCET) 5-325 mg per tablet 1 tablet  1 tablet Oral Q4H PRN    phenol (CHLORASEPTIC) 1 4 % mucosal liquid 1 spray  1 spray Mouth/Throat Q2H PRN    thiamine (VITAMIN B1) tablet 100 mg  100 mg Oral Daily    tiotropium (SPIRIVA) capsule for inhaler 18 mcg  18 mcg Inhalation Daily       Allergies   Allergen Reactions    Medical Tape     Other      Brown cloth band aids       Latex Rash         Objective   /57 (BP Location: Right arm)   Pulse 83   Temp 99 7 °F (37 6 °C) (Oral)   Resp 16   Ht 5' 6" (1 676 m)   Wt 63 5 kg (140 lb)   SpO2 97%   BMI 22 60 kg/m²     Intake/Output Summary (Last 24 hours) at 10/14/18 1204  Last data filed at 10/14/18 8941   Gross per 24 hour   Intake           2252 4 ml   Output             2739 ml   Net           -486 6 ml       Current Weight: Weight - Scale: 63 5 kg (140 lb)    Physical Exam   Constitutional: He is oriented to person, place, and time  No distress  HENT:   Head: Normocephalic  Eyes: No scleral icterus  Neck: Neck supple  Cardiovascular: Normal rate and regular rhythm  Pulmonary/Chest: Effort normal and breath sounds normal    Abdominal: Soft  He exhibits no distension  There is tenderness  Musculoskeletal: He exhibits no edema  Neurological: He is alert and oriented to person, place, and time  Skin: Skin is warm and dry  Psychiatric: He has a normal mood and affect           Lab Results:      Results from last 7 days  Lab Units 10/14/18  0535   WBC Thousand/uL 14 28*   HEMOGLOBIN g/dL 8 6*   HEMATOCRIT % 28 2*   PLATELETS Thousands/uL 296       Results from last 7 days  Lab Units 10/14/18  0535   SODIUM mmol/L 140   POTASSIUM mmol/L 4 3   CHLORIDE mmol/L 107   CO2 mmol/L 25   BUN mg/dL 6   CREATININE mg/dL 1 24   CALCIUM mg/dL 8 0*

## 2018-10-14 NOTE — PROGRESS NOTES
Progress Note - Ludmila Valdez 1960, 66 y o  male MRN: [id number]    Unit/Bed#: [facility] 815-01 Encounter: [id number]    Primary Care Provider: Sofya Campo MD   Date and time admitted to hospital: 10/5/2018  9:05 AM        * Ileostomy in place [facility])   Assessment & Plan    10/5 open ileostomy reversal with small bowel resection   Regular diet  D/c IVF  UOP adequate  F/u am BMP- monitor DEBBY  FENa- 2 6% intrinsic   UA-negative     DVT ppx: SQH  Dispo: likely d/c if Cr improving      Subjective/Objective   Chief Complaint:     Subjective: SUZAN  Tolerating PO  No N/V  +bowel function  Objective:     Blood pressure 107/75, pulse 91, temperature 98 9 °F (37 2 °C), temperature source Oral, resp  rate 18, height 5' 6" (1 676 m), weight 63 5 kg (140 lb), SpO2 99 %  ,Body mass index is 22 6 kg/m²  Intake/Output Summary (Last 24 hours) at 10/14/18 0502  Last data filed at 10/14/18 0430   Gross per 24 hour   Intake           3506 8 ml   Output             1939 ml   Net           1567 8 ml       Invasive Devices     Peripheral Intravenous Line            Peripheral IV 10/11/18 Right Arm 2 days                Physical Exam: NAD  AAOX3  Normal respiratory effort  Soft, NT, ND  Incision open and draining serous fluid   No c/c/e      Lab, Imaging and other studies:  I have personally reviewed pertinent lab results    , CBC:   Lab Results   Component Value Date    WBC 14 90 (H) 10/13/2018    HGB 7 7 (L) 10/13/2018    HCT 25 6 (L) 10/13/2018    MCV 88 10/13/2018     10/13/2018    MCH 26 5 (L) 10/13/2018    MCHC 30 1 (L) 10/13/2018    RDW 15 8 (H) 10/13/2018    MPV 9 2 10/13/2018    NRBC 0 10/13/2018   , CMP:   Lab Results   Component Value Date     10/13/2018    K 4 2 10/13/2018     10/13/2018    CO2 29 10/13/2018    BUN 8 10/13/2018    CREATININE 1 25 10/13/2018    CALCIUM 7 7 (L) 10/13/2018    EGFR 64 10/13/2018     VTE Pharmacologic Prophylaxis: Heparin  VTE Mechanical Prophylaxis: sequential compression device

## 2018-10-14 NOTE — ASSESSMENT & PLAN NOTE
10/5 open ileostomy reversal with small bowel resection   Regular diet  D/c IVF  UOP adequate  F/u am BMP- monitor DEBBY  FENa- 2 6% intrinsic   UA-negative

## 2018-10-14 NOTE — PROGRESS NOTES
Spoke to Norma Romo MD that patient's IV was leaking  Per progress note from this morning patient potential discharge  Celestehaider Halina said he would switch IV thiamine to oral and ok to leave patient without IV for the time being

## 2018-10-15 VITALS
RESPIRATION RATE: 20 BRPM | OXYGEN SATURATION: 97 % | WEIGHT: 140 LBS | HEIGHT: 66 IN | BODY MASS INDEX: 22.5 KG/M2 | HEART RATE: 81 BPM | DIASTOLIC BLOOD PRESSURE: 59 MMHG | TEMPERATURE: 98.6 F | SYSTOLIC BLOOD PRESSURE: 97 MMHG

## 2018-10-15 LAB
ANION GAP SERPL CALCULATED.3IONS-SCNC: 7 MMOL/L (ref 4–13)
BASOPHILS # BLD AUTO: 0.03 THOUSANDS/ΜL (ref 0–0.1)
BASOPHILS NFR BLD AUTO: 0 % (ref 0–1)
BUN SERPL-MCNC: 7 MG/DL (ref 5–25)
CALCIUM SERPL-MCNC: 7.7 MG/DL (ref 8.3–10.1)
CHLORIDE SERPL-SCNC: 107 MMOL/L (ref 100–108)
CO2 SERPL-SCNC: 23 MMOL/L (ref 21–32)
CREAT SERPL-MCNC: 1.16 MG/DL (ref 0.6–1.3)
EOSINOPHIL # BLD AUTO: 1.02 THOUSAND/ΜL (ref 0–0.61)
EOSINOPHIL NFR BLD AUTO: 9 % (ref 0–6)
ERYTHROCYTE [DISTWIDTH] IN BLOOD BY AUTOMATED COUNT: 15.6 % (ref 11.6–15.1)
GFR SERPL CREATININE-BSD FRML MDRD: 70 ML/MIN/1.73SQ M
GLUCOSE SERPL-MCNC: 89 MG/DL (ref 65–140)
HCT VFR BLD AUTO: 25.5 % (ref 36.5–49.3)
HGB BLD-MCNC: 7.8 G/DL (ref 12–17)
IMM GRANULOCYTES # BLD AUTO: 0.07 THOUSAND/UL (ref 0–0.2)
IMM GRANULOCYTES NFR BLD AUTO: 1 % (ref 0–2)
LYMPHOCYTES # BLD AUTO: 2.58 THOUSANDS/ΜL (ref 0.6–4.47)
LYMPHOCYTES NFR BLD AUTO: 22 % (ref 14–44)
MCH RBC QN AUTO: 26.5 PG (ref 26.8–34.3)
MCHC RBC AUTO-ENTMCNC: 30.6 G/DL (ref 31.4–37.4)
MCV RBC AUTO: 87 FL (ref 82–98)
MONOCYTES # BLD AUTO: 1.14 THOUSAND/ΜL (ref 0.17–1.22)
MONOCYTES NFR BLD AUTO: 10 % (ref 4–12)
NEUTROPHILS # BLD AUTO: 6.99 THOUSANDS/ΜL (ref 1.85–7.62)
NEUTS SEG NFR BLD AUTO: 58 % (ref 43–75)
NRBC BLD AUTO-RTO: 0 /100 WBCS
PLATELET # BLD AUTO: 306 THOUSANDS/UL (ref 149–390)
PMV BLD AUTO: 9.9 FL (ref 8.9–12.7)
POTASSIUM SERPL-SCNC: 3.9 MMOL/L (ref 3.5–5.3)
RBC # BLD AUTO: 2.94 MILLION/UL (ref 3.88–5.62)
SODIUM SERPL-SCNC: 137 MMOL/L (ref 136–145)
WBC # BLD AUTO: 11.83 THOUSAND/UL (ref 4.31–10.16)

## 2018-10-15 PROCEDURE — 80048 BASIC METABOLIC PNL TOTAL CA: CPT | Performed by: SURGERY

## 2018-10-15 PROCEDURE — 85025 COMPLETE CBC W/AUTO DIFF WBC: CPT | Performed by: SURGERY

## 2018-10-15 PROCEDURE — 94760 N-INVAS EAR/PLS OXIMETRY 1: CPT

## 2018-10-15 RX ORDER — OXYCODONE HYDROCHLORIDE AND ACETAMINOPHEN 5; 325 MG/1; MG/1
1 TABLET ORAL EVERY 4 HOURS PRN
Qty: 20 TABLET | Refills: 0 | Status: SHIPPED | OUTPATIENT
Start: 2018-10-15 | End: 2018-10-25

## 2018-10-15 RX ADMIN — FOLIC ACID 1000 MCG: 1 TABLET ORAL at 08:31

## 2018-10-15 RX ADMIN — TIOTROPIUM BROMIDE 18 MCG: 18 CAPSULE ORAL; RESPIRATORY (INHALATION) at 08:33

## 2018-10-15 RX ADMIN — AMIODARONE HYDROCHLORIDE 200 MG: 200 TABLET ORAL at 08:31

## 2018-10-15 RX ADMIN — HEPARIN SODIUM 5000 UNITS: 5000 INJECTION, SOLUTION INTRAVENOUS; SUBCUTANEOUS at 06:00

## 2018-10-15 RX ADMIN — GABAPENTIN 400 MG: 400 CAPSULE ORAL at 08:31

## 2018-10-15 RX ADMIN — THIAMINE HCL TAB 100 MG 100 MG: 100 TAB at 08:31

## 2018-10-15 RX ADMIN — NICOTINE 1 PATCH: 7 PATCH, EXTENDED RELEASE TRANSDERMAL at 08:32

## 2018-10-15 RX ADMIN — ASPIRIN 81 MG 81 MG: 81 TABLET ORAL at 08:31

## 2018-10-15 RX ADMIN — OXYCODONE HYDROCHLORIDE AND ACETAMINOPHEN 1 TABLET: 5; 325 TABLET ORAL at 12:54

## 2018-10-15 RX ADMIN — OXYCODONE HYDROCHLORIDE AND ACETAMINOPHEN 1 TABLET: 5; 325 TABLET ORAL at 04:09

## 2018-10-15 RX ADMIN — OXYCODONE HYDROCHLORIDE AND ACETAMINOPHEN 1 TABLET: 5; 325 TABLET ORAL at 08:40

## 2018-10-15 NOTE — ASSESSMENT & PLAN NOTE
10/5 open ileostomy reversal with small bowel resection   Regular diet  OK to discharge from renal standpoint  OOB, ambulate  Discharge planning

## 2018-10-15 NOTE — PROGRESS NOTES
Progress Note - Colorectal Surgery   Von Shanda 66 y o  male MRN: [id number]  Unit/Bed#: [facility] 815-01 Encounter: [id number]    Assessment/Plan:  66 y o  male with hx ileostomy s/p reversal, DEBBY now resolved     * Ileostomy in place [facility])   Assessment & Plan    10/5 open ileostomy reversal with small bowel resection   Regular diet  OK to discharge from renal standpoint  OOB, ambulate  Discharge planning         Subjective/Objective     Subjective: Patient feels well  No complaints  Objective:     Vitals: Blood pressure 115/59, pulse 89, temperature 98 4 °F (36 9 °C), temperature source Oral, resp  rate 16, height 5' 6" (1 676 m), weight 63 5 kg (140 lb), SpO2 97 %  ,Body mass index is 22 6 kg/m²  I/O       10/13 0701 - 10/14 0700 10/14 0701 - 10/15 0700    P  O  680 240    I V  (mL/kg) 2326 8 (36 6)     IV Piggyback 550     Total Intake(mL/kg) 3556 8 (56) 240 (3 8)    Urine (mL/kg/hr) 1939 (1 3) 2900 (1 9)    Stool 0 0    Total Output 1939 2900    Net +1617 8 -2660          Unmeasured Urine Occurrence 5 x 1 x    Unmeasured Stool Occurrence 4 x 4 x          Physical Exam:  GEN: NAD  HEENT: MMM  CV: RRR  Lung: Normal effort  Ab: Soft, NT/ND, incision C/D/I  Extrem: No CCE  Neuro:  A+Ox3    Lab, Imaging and other studies: CBC with diff: No results found for: WBC, HGB, HCT, MCV, PLT, ADJUSTEDWBC, MCH, MCHC, RDW, MPV, NRBC, BMP/CMP: No results found for: NA, K, CL, CO2, ANIONGAP, BUN, CREATININE, GLUCOSE, CALCIUM, AST, ALT, ALKPHOS, PROT, ALBUMIN, BILITOT, EGFR, Magnesium: No results found for: MAG  VTE Pharmacologic Prophylaxis: Heparin  VTE Mechanical Prophylaxis: sequential compression device

## 2018-10-15 NOTE — PROGRESS NOTES
D/C instructions and prescription given to patient  Patient declined nurse reviewing D/C instructions with patient

## 2018-10-15 NOTE — SOCIAL WORK
Cm reviewed patient during care coordination rounds with [facility]  Patient stable for discharge home today  [facility] able to accept for resumption of care and aware of patient's discharge home today  Patient has 02 at home through [facility] LESIA Yang following

## 2018-10-15 NOTE — DISCHARGE SUMMARY
Discharge Summary - Colorectal Surgery   Benjamin Pac 66 y o  male MRN: [id number]  Unit/Bed#: [facility] 815-01 Encounter: [id number]        Admitting Diagnosis:Crohn's Disease, ileostomy    Admit Date: 10/5/18    Discharge Diagnosis: Same    Discharge Date: 10/15/18    HPI:Patient is a 16-year-old male with history of right hemicolectomy, perforated transverse colon s/p EDITH, ileal colon resection, diverting ileostomy in 84/0269, complicated by intra-abdominal abscess s/p IR drain placed 06/26/2018 who presented on 07/08  Patient has been hospitalized multiple times since his diverting ileostomy due to high ileostomy output and acute kidney injury  He is now being admitted for closure of the ileostomy  Procedures Performed: 10/5/18 Closure ileostomy -   [facility] Course: Patient has done relatively well post operatively  He has been difficult with the nursing staff due to pain issues  He was started on clear liquids post operatively and became distended with emesis  Initially, the patient refused to have a nasogastric tube but later agreed  4 liters of bilious fluid was returned immediately  Patient subsequently pulled the nasogastric tube out the following day and he again became distended, tympanic, and nauseated  The nasogastric tube was again inserted and left for 3 days  He started passing flatus and having bowel movements  The nasogastric tube was removed and a clear liquid diet was again started and he was able to slowly be advanced to a house diet  Patient had his midline wick removed by POD #4  The sutures remain intact  His right abdominal wound is clean and dry  Patient is ambulating the halls  He will be discharged home today on Percocet 5/325 every 4-6 hours as needed for pain  # 20 tabs were dispensed and no refills   Patient will follow with Dr Pierre Espinoza in 7-10 days for suture removal        Complications: None      Condition at Discharge: good     Discharge instructions/Information to patient and family:   See after visit summary for information provided to patient and family  Provisions for Follow-Up Care:  See after visit summary for information related to follow-up care and any pertinent home health orders  Disposition: Home with [facility]    Planned Readmission: No    Discharge Statement   I spent 30 minutes discharging the patient  This time was spent on the day of discharge  I had direct contact with the patient on the day of discharge  Additional documentation is required if more than 30 minutes were spent on discharge  Discharge Medications:  See after visit summary for reconciled discharge medications provided to patient and family

## 2018-10-16 ENCOUNTER — TRANSITIONAL CARE MANAGEMENT (OUTPATIENT)
Dept: INTERNAL MEDICINE CLINIC | Facility: CLINIC | Age: 58
End: 2018-10-16

## 2018-10-23 ENCOUNTER — PATIENT OUTREACH (OUTPATIENT)
Dept: INTERNAL MEDICINE CLINIC | Facility: CLINIC | Age: 58
End: 2018-10-23

## 2018-10-23 NOTE — PROGRESS NOTES
Outpatient Care Management Note:    Called and spoke with pt  Pt had ileostomy reversal on 10/5/18 and was at [facility] from 10/5 - 10/15/18 and d/c home with [facility] for nursing and continuation of PT  Lorna Camargo LPN followed up with pt last week and I called to f/u with him this week  Pt reports he feels well and managing at home with the help of his daughter at times  Pt reports some abdominal pain that he rates 5/10 that comes and goes through out the day and was given Percocet to take as needed  Pt reports he has been taking the Percocet mostly at night to help him sleep  Pt reports he is eating normally and is having bowel movements and passing gas  Pt denies any other symptoms or complaints  Pt reports sutures are in place and has bandage over area as there is some drainage  still coming for incision which he had in the hospital  Pt denies any signs or symptoms of infection and pt reports [facility] nurse is coming out to check on it and pt's daughter is helping with dressing changes  Pt reports he has a f/u appt with Dr Fran Polk on 11/6 @ 3pm  Pt reports he is still using a cane majority of the time to get around and walker at times  Physical therapy with [facility] is seeing pt still  Pt reminded of hospital f/u appt on Thursday 10/25 @ 10 am with AMALIA Han  Pt reports his family is unable to bring him and he is still not comfortable with driving yet and his truck is not working right and requesting if he can use [employer] for this appt and understands this would be his last ride with [employer]  I got verbal  approval  and called [facility] and it was scheduled for a  time of 9am and office will call for ride back home when he is done with his appt  Pt made aware of this  Pt will have [employer] to use for in the future  Pt reports he has all his medication at this time, taking as prescribed and daughter is still assisting with his meds   Pt does not have any further questions or concerns at this time  Pt has my # and office # if needed

## 2018-10-25 ENCOUNTER — OFFICE VISIT (OUTPATIENT)
Dept: INTERNAL MEDICINE CLINIC | Facility: CLINIC | Age: 58
End: 2018-10-25
Payer: COMMERCIAL

## 2018-10-25 VITALS
HEART RATE: 80 BPM | WEIGHT: 125.22 LBS | DIASTOLIC BLOOD PRESSURE: 60 MMHG | SYSTOLIC BLOOD PRESSURE: 100 MMHG | TEMPERATURE: 97.5 F | BODY MASS INDEX: 20.21 KG/M2

## 2018-10-25 DIAGNOSIS — Z98.890 STATUS POST REVERSAL OF ILEOSTOMY: Primary | ICD-10-CM

## 2018-10-25 PROCEDURE — 99495 TRANSJ CARE MGMT MOD F2F 14D: CPT | Performed by: NURSE PRACTITIONER

## 2018-10-25 PROCEDURE — 1111F DSCHRG MED/CURRENT MED MERGE: CPT | Performed by: NURSE PRACTITIONER

## 2018-10-25 NOTE — PROGRESS NOTES
Assessment/Plan:     Diagnoses and all orders for this visit:    Status post reversal of ileostomy        -     Patient is advised to change dressing BID, and prn as it gets wet        -     Advised to keep upcoming apt with surgery team        -     To avoid constipation        -     To avoid trauma to abdomen        -     To reports any suspicious symptoms ASAP; fever/chills, redness of surgical site, pus, or malodorous odor            Subjective: presents to the clinic for TCM      Patient ID: Reesa Klinefelter is a 66 y o  male  HPI  He was in the hospital for reversal of ileostomy  This was done 10/05/5018 and his was discharged 10/15/2018  He reports he has been having bowel movements frequently, about 2-3 times a day  The BMs are soft  Reports intermittent pain at the reversal site but he is taking oxycodone for the pain; sometimes once a day, other times BID  Has enough at home  He knows to call the surgery team to get more meds if need be  Denies any needs at this time  No fever, chills, Nausea, vomiting, dizziness, bloating or weakness  Currently on wheel chair in the room  The following portions of the patient's history were reviewed and updated as appropriate: allergies, current medications, past family history, past medical history, past social history, past surgical history and problem list     Review of Systems   Constitutional: Positive for fatigue (with exertion)  Negative for appetite change, chills and fever  Respiratory: Negative for cough, chest tightness, shortness of breath and wheezing  Cardiovascular: Negative for chest pain and palpitations  Gastrointestinal: Positive for abdominal pain (at the ileostomy reversal site)  Negative for blood in stool, diarrhea, nausea and vomiting  Genitourinary: Negative for decreased urine volume, difficulty urinating, dysuria and frequency  Neurological: Negative for dizziness           Objective:      /60 (BP Location: Left arm, Patient Position: Sitting, Cuff Size: Adult)   Pulse 80   Temp 97 5 °F (36 4 °C) (Oral)   Wt 56 8 kg (125 lb 3 5 oz)   BMI 20 21 kg/m²        Physical Exam   Constitutional: He appears well-developed and well-nourished  No distress  BMI 20 21 kg/m2   Cardiovascular: Normal rate, regular rhythm and normal heart sounds  No murmur heard  Pulmonary/Chest: Effort normal and breath sounds normal  No respiratory distress  He has no wheezes  Abdominal: Soft  Bowel sounds are normal  He exhibits no distension  There is tenderness (mild palption of the surgical site elicts mild pain  the dressing is opened and it shows gauze with mild to moderate wetness  last changed 1-2 hrs prior  No bleeding  no odor  No redness)  Skin: He is not diaphoretic  Vitals reviewed  Date and time hospital follow up call was made:  10/17/2018 10:03 AM  Hospital care reviewed:  Records reviewed  Patient was hopsitalized at:  [facility]  Date of admission:  10/5/18  Date of discharge:  10/15/18  Diagnosis:  ILEOSTOMY IN PLACE, REVERSAL - Z93 2  Disposition:  Home  Were the patients medicaitons reviewed and updated:  Yes  Current symptoms:  Incisional pain (Comment: OVERALL PT  STATES HE IS FEELING GOOD, HE HAS SOME SLIGHT RED TINGED DRAINAGE FROM THE INCISION SITE BUT STATED THAT [facility] [address] [facility] WELL AND DR YE LOOKED AT IT AND SAID IT WAS NORMAL AND NOT TO WORRY ABOUT IT)  Incisional pain severity:  Mild  Incisional pain onset:  Gradual  Post hospital issues:  Poor ADL (Activities of Daily Living) performance, Reduced activity (Comment: USING A CANE NOW INSTEAD OF A WALKER, HAVING TROUBLE GETTING UP THE STAIRS, USING COMMODE AND URINAL IN LIVING ROOM)  Should patient be enrolled in anticoag monitoring?:  No  Scheduled for follow up?:  Yes (Comment: 10/25/18 WITH Deondre Auguste)  Patients specialists:  Cardiologist, Other (comment)  Cardiologist's Name:  Aimee Greenberg, [address] [place] [address]  Cardiologist contact #:  [phone]  Other specialists Name:  Chastity Sanches, [address] / [facility]  Other specialists contact #:  [phone]  Did you obtain your prescribed medications:  Yes  Do you need help managing your perscriptions or medications:  No (Comment: Matthias Pimentel)  Is transportation to your appointments needed:  No (Comment: KELLY WILSON OR [facility] DRIVES HIM, PT  STATED HE HAS A TRUCK BUT IT IS NOT INSPECTED AND IS WORKING ON THAT SO HE CAN START DRIVING HIMSELF AGAIN  STATED HE DOES NOT LIKE TAKING Arcelia Neer)  I have advised the patient to call PCP with any new or worsening symptoms (please type in name along with any credentials):  Paulino Whittaker LPN  Living Arrangements:  Children  Support System:  Family  Are you recieving outpatient services:  No  Are you recieving home care services:  Yes (Comment: [facility], SHE IS SCHEDULED TO COME OUT TODAY 10/17/18)  Types of home care services:  Nurse visit (Comment: [facility], PHYSICAL THERAPY)  Are you using any community resources:  No  Have you fallen in the last 12 months:  No (Comment: DID [address])  Interperter language line required?:  No  Counseling:  Patient  Counseling topics:  instructions for management, Importance of RX compliance  Comments:  OFFERED TO SCHEDULE APPT  WITH DR YE FOR SUTURE REMOVAL IN 7-10 DAYS BUT PT  STATED HE WILL CALL HIMSELF TODAY

## 2018-10-25 NOTE — PATIENT INSTRUCTIONS

## 2018-10-30 DIAGNOSIS — K52.9 COLITIS: ICD-10-CM

## 2018-10-31 NOTE — TELEPHONE ENCOUNTER
It looks as though surgery was the initial prescribers for this vitamin supplementation  I would assume secondary to his ileostomy, but that, I believe has since been reversed  I am unaware of indications for Vitamin A supplementation in this population   I would refer this medication refill back to the initial prescriber - Dr Leigh Daniel

## 2018-11-01 NOTE — TELEPHONE ENCOUNTER
I am not sure  Dr Oma Urbina is one of the surgery residents, and she was the one with the original prescription

## 2018-11-02 NOTE — TELEPHONE ENCOUNTER
Will call Dr Favio Yu office to clarify if patient followed up for his pre op visit 10/20 and if they refilled this medication

## 2018-11-02 NOTE — TELEPHONE ENCOUNTER
Patient states he spoke with someone yesterday who told him not to take it I told him if his pharmacy calls to disregard

## 2018-11-02 NOTE — TELEPHONE ENCOUNTER
Spoke with Dr Lisa Limon office they have no record of him being on Vitamin A patient is scheduled to see them on 11/7 for a post op visit should patient wait until then to discuss this with the specialist??

## 2018-11-07 ENCOUNTER — PATIENT OUTREACH (OUTPATIENT)
Dept: INTERNAL MEDICINE CLINIC | Facility: CLINIC | Age: 58
End: 2018-11-07

## 2018-11-07 NOTE — Clinical Note
RN Outpatient Care Management Update  FYI I closed pt from outpatient nurse care management as pt is self managing well at this time

## 2018-11-20 DIAGNOSIS — F10.239 ALCOHOL DEPENDENCE WITH WITHDRAWAL WITH COMPLICATION (HCC): ICD-10-CM

## 2018-11-20 DIAGNOSIS — I48.91 ATRIAL FIBRILLATION WITH RVR (HCC): ICD-10-CM

## 2018-11-20 RX ORDER — AMIODARONE HYDROCHLORIDE 200 MG/1
TABLET ORAL
Qty: 30 TABLET | Refills: 3 | Status: SHIPPED | OUTPATIENT
Start: 2018-11-20 | End: 2018-12-20

## 2018-11-20 RX ORDER — FOLIC ACID 1 MG/1
TABLET ORAL
Qty: 30 TABLET | Refills: 1 | Status: SHIPPED | OUTPATIENT
Start: 2018-11-20 | End: 2019-02-12 | Stop reason: SDUPTHER

## 2018-11-29 ENCOUNTER — TELEPHONE (OUTPATIENT)
Dept: INTERNAL MEDICINE CLINIC | Facility: CLINIC | Age: 58
End: 2018-11-29

## 2018-11-30 NOTE — TELEPHONE ENCOUNTER
Called patient to make him aware  Patient did not answer  Left detailed message stating he can take the melatonin at night  Also advised him if he had any further questions he can call us back here at the office

## 2018-12-20 ENCOUNTER — OFFICE VISIT (OUTPATIENT)
Dept: CARDIOLOGY CLINIC | Facility: CLINIC | Age: 58
End: 2018-12-20
Payer: COMMERCIAL

## 2018-12-20 VITALS
SYSTOLIC BLOOD PRESSURE: 102 MMHG | HEART RATE: 90 BPM | DIASTOLIC BLOOD PRESSURE: 52 MMHG | BODY MASS INDEX: 22.02 KG/M2 | HEIGHT: 66 IN | WEIGHT: 137 LBS

## 2018-12-20 DIAGNOSIS — Z95.5 PRESENCE OF DRUG-ELUTING STENT IN RIGHT CORONARY ARTERY: ICD-10-CM

## 2018-12-20 DIAGNOSIS — E78.5 DYSLIPIDEMIA: ICD-10-CM

## 2018-12-20 DIAGNOSIS — I25.10 CORONARY ARTERY DISEASE INVOLVING NATIVE CORONARY ARTERY OF NATIVE HEART WITHOUT ANGINA PECTORIS: Primary | ICD-10-CM

## 2018-12-20 DIAGNOSIS — I48.0 PAROXYSMAL ATRIAL FIBRILLATION (HCC): ICD-10-CM

## 2018-12-20 PROCEDURE — 99214 OFFICE O/P EST MOD 30 MIN: CPT | Performed by: INTERNAL MEDICINE

## 2018-12-20 NOTE — PROGRESS NOTES
Cardiology Follow Up    Demi Chen  1960  [id number]  [address] [facility]  [address] [place] [address] [address] [place] [id number] [phone]    1  Coronary artery disease involving native coronary artery of native heart without angina pectoris     2  Presence of drug-eluting stent in right coronary artery     3  Paroxysmal atrial fibrillation (HCC)     4  Dyslipidemia         Interval History:     Mr Pati Bell comes in for follow-up given his history of coronary artery disease and paroxysmal atrial fibrillation  He has a history of a myocardial infarct in 2012 in which his received a stent to the RCA  Last year he presented with a pleuritic chest pain made worse with inspiration, however there were ST elevations and therefore he was taken to the cardiac cath lab  There he had no changes in his RCA stent was open  Given his classic pleuritic type symptoms, he was then treated for pericarditis  We increased his aspirin and added colchicine and he responded well  Then earlier this year Mr Pati Bell was in the hospital with colitis and perforated bowel  He required surgery and a colostomy  In the setting of this he went into atrial fibrillation  He did convert and was placed on amiodarone  Anticoagulation was not started  He has remained in sinus rhythm since  The next month he had reversal of his ostomy  During his initial hospitalization he appeared to have a stress-induced cardiomyopathy, with an ejection fraction dropping down to 25%  However a month later, repeat echocardiogram showed his ejection fraction come back to his baseline in the low-normal range  Since last seen him he was in the hospital with hypotension, and his lisinopril metoprolol were stopped  His blood pressure still runs a little on the low side, but he is asymptomatic  Overall Mr Pati Bell is felt well  He denies any chest pain or palpitations    He does have chronic shortness of breath associated with COPD, but this has not changed at all  He denies any orthopnea, PND or any symptoms of congestive heart failure  He denies any lightheadedness, palpitations or syncope  He is tolerating his medications        Patient Active Problem List   Diagnosis    Crohn disease ([facility] )    Spinal stenosis of cervical region    Hypertension    Pericarditis    Coronary artery disease involving native coronary artery of native heart without angina pectoris    Asthma    Atherosclerosis of coronary artery    COPD (chronic obstructive pulmonary disease) ([facility])    Dyslipidemia    Alcohol abuse    Cigarette nicotine dependence without complication    Alcohol dependence with withdrawal ([place] [address] )    Acute respiratory insufficiency    Agitation    Heart failure, systolic, due to idiopathic cardiomyopathy ([place] [address] )    Ileostomy in place ([place] [address] )    Moderate protein-calorie malnutrition ([facility])    Anemia    Hypocalcemia    Perforation of colon ([facility])    Hypotension    Intra-abdominal abscess ([facility])    DEBBY (acute kidney injury) ([place] [address] )    Abdominal pain    Ambulatory dysfunction    Presence of drug-eluting stent in right coronary artery    Paroxysmal atrial fibrillation ([facility])    Hypercalcemia    Hematemesis with nausea    Status post reversal of ileostomy     Past Medical History:   Diagnosis Date    Anxiety     Asthma     Cardiac disease     Cervical stenosis (uterine cervix)     Chest pain     Chronic kidney disease     Colitis     Colon polyps     COPD (chronic obstructive pulmonary disease) ([facility])     2L @ HS and PRN    Coronary artery disease     Diverticulitis     Esophageal reflux     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HCC)     Hyperlipidemia     Hypertension     IBD (inflammatory bowel disease)     Myocardial infarction ([place] [address] )     Old myocardial infarction     Perforation of colon ([facility])     Type 2 diabetes, diet controlled ([place] [address] )     Ulcerative colitis ([place] [address] ) Social History     Social History    Marital status:      Spouse name: N/A    Number of children: N/A    Years of education: N/A     Occupational History    Not on file  Social History Main Topics    Smoking status: Current Every Day Smoker     Packs/day: 3 00     Years: 40 00     Types: Cigars    Smokeless tobacco: Never Used      Comment: 3 cigars per day    Alcohol use No      Comment: pt "quit about 4 months ago"    Drug use: No    Sexual activity: Yes     Partners: Female     Birth control/ protection: None     Other Topics Concern    Not on file     Social History Narrative    No narrative on file      Family History   Problem Relation Age of Onset    Coronary artery disease Father     Crohn's disease Father     Stroke Father     Diabetes Family      Past Surgical History:   Procedure Laterality Date    ANGIOPLASTY      APPENDECTOMY      COLON SURGERY      COLONOSCOPY N/A 10/24/2016    Procedure: COLONOSCOPY;  Surgeon: Laura Cheek MD;  [facility]; Service:     CORONARY ANGIOPLASTY WITH STENT PLACEMENT      ESOPHAGOGASTRODUODENOSCOPY N/A 10/24/2016    Procedure: ESOPHAGOGASTRODUODENOSCOPY (EGD); Surgeon: Laura Cheek MD;  [facility]; Service:     EXPLORATORY LAPAROTOMY W/ BOWEL RESECTION N/A 5/22/2018    Procedure: EXPLORATORY LAPAROTOMY, ILIOCOLECTOMY, ILIOCOLONIC ANASTAMOSIS, LOOP ILIOSTOMY, REPAIR OF SEROSAL TEAR, EXTENSIVE LYSIS OF ADHESIONS, WOUND VAC PLACEMENT;  Surgeon: Laura Cheek MD;  [facility];  Service: Colorectal    HEMICOLECTOMY      ILEOSTOMY CLOSURE N/A 10/5/2018    Procedure: Alexandria Cassette;  Surgeon: Laura Cheek MD;  [facility];  Service: Colorectal    OTHER SURGICAL HISTORY      stent indications acute myocardial infarction    WI COLONOSCOPY FLX DX W/COLLJ SPEC WHEN PFRMD N/A 2/6/2018    Procedure: COLONOSCOPY;  Surgeon: Laura Cheek MD;  [facility];   Service: Colorectal    DC COLONOSCOPY FLX DX W/COLLJ SPEC WHEN PFRMD N/A 10/4/2018    Procedure: COLONOSCOPY;  Surgeon: Cecile Rivera MD;  [facility];   Service: Colorectal    TONSILLECTOMY         Current Outpatient Prescriptions:     albuterol (2 5 mg/3 mL) 0 083 % nebulizer solution, Take 3 mL (2 5 mg total) by nebulization every 4 (four) hours as needed for wheezing as needed for wheezing, Disp: 150 mL, Rfl: 0    albuterol (VENTOLIN HFA) 90 mcg/act inhaler, Ventolin HFA 90 mcg/actuation aerosol inhaler, Disp: , Rfl:     amiodarone 200 mg tablet, TAKE 1 TABLET BY MOUTH EVERY DAY, Disp: 30 tablet, Rfl: 3    aspirin 81 mg chewable tablet, Chew 1 tablet (81 mg total) daily, Disp: 30 tablet, Rfl: 0    atorvastatin (LIPITOR) 40 mg tablet, Take 1 tablet (40 mg total) by mouth daily with dinner, Disp: 30 tablet, Rfl: 3    folic acid (FOLVITE) 1 mg tablet, TAKE 1 TABLET BY MOUTH EVERY DAY, Disp: 30 tablet, Rfl: 1    gabapentin (NEURONTIN) 300 mg capsule, Take 1 capsule (300 mg total) by mouth 3 (three) times a day (Patient taking differently: Take 400 mg by mouth 3 (three) times a day  ), Disp: 90 capsule, Rfl: 3    mesalamine (PENTASA) 500 mg CR capsule, Take 1 capsule (500 mg total) by mouth 3 (three) times a day, Disp: , Rfl: 0    Nebulizers (AERONEB GO NEBULIZER HANDSET) MISC, by Does not apply route 2 (two) times a day as needed (wheezing), Disp: 1 each, Rfl: 0    thiamine (VITAMIN B1) 100 mg tablet, Vitamin B-1 100 mg tablet, Disp: , Rfl:     Tiotropium Bromide Monohydrate (SPIRIVA RESPIMAT) 2 5 MCG/ACT AERS, Inhale 1 Act (2 5 mcg total) daily, Disp: 1 Inhaler, Rfl: 0    vitamin A 10,000 units capsule, Take 3 capsules (30,000 Units total) by mouth daily, Disp: 10 capsule, Rfl: 0  Allergies   Allergen Reactions    Medical Tape     Other      Brown cloth band aids       Latex Rash       Labs:  Lab Results   Component Value Date     05/29/2015    K 3 9 10/15/2018    K 4 4 05/29/2015    CL 107 10/15/2018     05/29/2015    CO2 23 10/15/2018    CO2 21 07/08/2018    BUN 7 10/15/2018    BUN 10 05/29/2015    CREATININE 1 16 10/15/2018    CREATININE 0 87 05/29/2015    GLUCOSE 92 07/08/2018    GLUCOSE 122 05/29/2015    CALCIUM 7 7 (L) 10/15/2018    CALCIUM 9 1 05/29/2015     Lab Results   Component Value Date    WBC 11 83 (H) 10/15/2018    WBC 9 06 05/28/2015    HGB 7 8 (L) 10/15/2018    HGB 15 2 05/28/2015    HCT 25 5 (L) 10/15/2018    HCT 45 9 05/28/2015    MCV 87 10/15/2018    MCV 95 05/28/2015     10/15/2018     05/28/2015     Lab Results   Component Value Date    TRIG 171 (H) 05/20/2018    HDL 67 (H) 05/20/2018     Imaging:     ECHO(6/2018):  PROCEDURE INFORMATION:  This was a technically difficult study      LEFT VENTRICLE:  Systolic function was at the lower limits of normal  Ejection fraction was estimated to be 50 %  There was akinesis of the basal inferoseptal and basal inferior wall(s)  There was no evidence of concentric hypertrophy      RIGHT VENTRICLE:  The ventricle was mildly dilated  Systolic function was mildly to moderately reduced      MITRAL VALVE:  There was mild regurgitation      AORTIC VALVE:  There was very mild stenosis  There was trace regurgitation      TRICUSPID VALVE:  There was mild regurgitation      PULMONIC VALVE:  There was trace regurgitation  Review of Systems:  Review of Systems   Constitutional: Positive for fatigue  HENT: Negative  Eyes: Negative  Respiratory: Positive for shortness of breath  Cardiovascular: Negative  Gastrointestinal: Negative  Musculoskeletal: Negative  Skin: Negative  Allergic/Immunologic: Negative  Neurological: Negative  Hematological: Negative  Psychiatric/Behavioral: Negative  All other systems reviewed and are negative  Physical Exam:  Physical Exam   Constitutional: He is oriented to person, place, and time  He appears well-developed and well-nourished     HENT:   Head: Normocephalic and atraumatic  Eyes: Pupils are equal, round, and reactive to light  EOM are normal  Right eye exhibits no discharge  Left eye exhibits no discharge  No scleral icterus  Neck: Normal range of motion  Neck supple  No JVD present  No thyromegaly present  Cardiovascular: Normal rate, regular rhythm, S1 normal, S2 normal, normal heart sounds, intact distal pulses and normal pulses  No extrasystoles are present  Exam reveals no gallop and no friction rub  No murmur heard  Pulmonary/Chest: Effort normal  No respiratory distress  He has decreased breath sounds  He has no wheezes  He has no rales  Abdominal: Soft  Bowel sounds are normal  He exhibits no distension  There is no tenderness  Musculoskeletal: Normal range of motion  He exhibits no edema, tenderness or deformity  Neurological: He is alert and oriented to person, place, and time  No cranial nerve deficit  Skin: Skin is warm and dry  No rash noted  Psychiatric: He has a normal mood and affect  Judgment and thought content normal    Nursing note and vitals reviewed  Discussion/Summary:    1  Paroxysmal atrial fibrillation - This occur during an acute hospitalization for colitis requiring bowel surgery and acute respiratory failure  He returned to sinus rhythm was placed on amiodarone  Amiodarone is not a good medication for him to be on long-term, given his age and COPD  At this point we are going to discontinue this  The hope at some point, his blood pressure remained stable is to readmit and AV sam blocker like metoprolol  Since this occurred in the acuity of his colitis, he will continue on aspirin for stroke prevention and a low CHADS-VASc score of 1  We will see him back in follow-up in 6 months  2  Coronary artery disease - He had a prior history of a myocardial infarction in 2012  He had a drug-eluting stent to the RCA  He is without symptoms of angina  He will remain on the same medical therapy    No changes were made  3  History of pericarditis - Resolved with no recurrent symptoms  4  Dyslipidemia - He will remain on the same dose of atorvastatin  We will continue to follow blood work periodically  Counseling / Coordination of Care  Total office / unit time spent today 25 minutes  Greater than 50% of total time was spent with the patient and / or family counseling and / or coordination of care

## 2018-12-21 NOTE — PROGRESS NOTES
Called Dr Juárez's office and spoke with [phone]   She checked with surgery schedule T[place]  There is no form to be filled out for their office for clearance   They are requested office note/letter stating pt is medically cleared for his ileostomy reversal  Clearance can be faxed to Attn: Buck Thomas DISPLAY PLAN FREE TEXT DISPLAY PLAN FREE TEXT DISPLAY PLAN FREE TEXT

## 2019-01-08 DIAGNOSIS — K50.918 CROHN'S DISEASE WITH OTHER COMPLICATION, UNSPECIFIED GASTROINTESTINAL TRACT LOCATION (HCC): Primary | ICD-10-CM

## 2019-01-08 RX ORDER — THIAMINE MONONITRATE (VIT B1) 100 MG
100 TABLET ORAL DAILY
Qty: 90 TABLET | Refills: 3 | Status: SHIPPED | OUTPATIENT
Start: 2019-01-08 | End: 2019-08-09 | Stop reason: ALTCHOICE

## 2019-01-29 DIAGNOSIS — M54.12 CERVICAL RADICULOPATHY: ICD-10-CM

## 2019-01-29 RX ORDER — GABAPENTIN 300 MG/1
300 CAPSULE ORAL 3 TIMES DAILY
Qty: 90 CAPSULE | Refills: 0 | Status: SHIPPED | OUTPATIENT
Start: 2019-01-29 | End: 2019-04-12 | Stop reason: SDUPTHER

## 2019-02-05 DIAGNOSIS — J45.909 UNCOMPLICATED ASTHMA, UNSPECIFIED ASTHMA SEVERITY, UNSPECIFIED WHETHER PERSISTENT: Primary | ICD-10-CM

## 2019-02-05 RX ORDER — ALBUTEROL SULFATE 90 UG/1
1 AEROSOL, METERED RESPIRATORY (INHALATION) EVERY 4 HOURS PRN
Qty: 1 INHALER | Refills: 6 | Status: SHIPPED | OUTPATIENT
Start: 2019-02-05 | End: 2019-08-09 | Stop reason: ALTCHOICE

## 2019-02-12 DIAGNOSIS — F10.239 ALCOHOL DEPENDENCE WITH WITHDRAWAL WITH COMPLICATION (HCC): ICD-10-CM

## 2019-02-12 RX ORDER — FOLIC ACID 1 MG/1
TABLET ORAL
Qty: 30 TABLET | Refills: 1 | Status: SHIPPED | OUTPATIENT
Start: 2019-02-12 | End: 2019-04-25 | Stop reason: SDUPTHER

## 2019-02-14 ENCOUNTER — TELEPHONE (OUTPATIENT)
Dept: MULTI SPECIALTY CLINIC | Facility: CLINIC | Age: 59
End: 2019-02-14

## 2019-02-14 NOTE — TELEPHONE ENCOUNTER
Please review ([address]) form placed in the ([facility]) team folder in the provider flow station  ([phone]) when form is complete  If the form requires a signature by a MD or DO, please review it with them and sign the form  Please place the form in the [facility]) team folder in the Clinical flow station at the [address] [facility] and reply to this task to the Clinical Pool

## 2019-02-21 ENCOUNTER — TELEPHONE (OUTPATIENT)
Dept: INTERNAL MEDICINE CLINIC | Facility: CLINIC | Age: 59
End: 2019-02-21

## 2019-02-27 DIAGNOSIS — I25.10 CORONARY ARTERY DISEASE INVOLVING NATIVE CORONARY ARTERY OF NATIVE HEART WITHOUT ANGINA PECTORIS: ICD-10-CM

## 2019-02-27 RX ORDER — ATORVASTATIN CALCIUM 40 MG/1
40 TABLET, FILM COATED ORAL
Qty: 30 TABLET | Refills: 3 | Status: SHIPPED | OUTPATIENT
Start: 2019-02-27 | End: 2019-07-06 | Stop reason: SDUPTHER

## 2019-02-27 NOTE — TELEPHONE ENCOUNTER
I am not sure how soon Dimple Gamino will get to this , patient needs this ASAP   Can you please handle ?

## 2019-02-27 NOTE — TELEPHONE ENCOUNTER
Patient needs refills sent out on this ASAP TODAY  Patients insurance ends tomorrow and he has to go through his employer to obtain insurance which can take up to 60 days   He needs refills sent on this med before his coverage ends        (atorvastatin)

## 2019-03-25 ENCOUNTER — PATIENT OUTREACH (OUTPATIENT)
Dept: INTERNAL MEDICINE CLINIC | Facility: CLINIC | Age: 59
End: 2019-03-25

## 2019-03-25 ENCOUNTER — TELEPHONE (OUTPATIENT)
Dept: INTERNAL MEDICINE CLINIC | Facility: CLINIC | Age: 59
End: 2019-03-25

## 2019-03-25 DIAGNOSIS — J45.909 ASTHMA, UNSPECIFIED ASTHMA SEVERITY, UNSPECIFIED WHETHER COMPLICATED, UNSPECIFIED WHETHER PERSISTENT: Primary | ICD-10-CM

## 2019-03-25 NOTE — TELEPHONE ENCOUNTER
Patient called in to inform us that he is having trouble with his insurance right now so the only prescription coverage that he has is [employer] [facility]  He is in need of refills for his VENTOLIN but due to his insurance trouble , the pharmacist suggested you call in [facility] because it would be half the price   Please send electronically

## 2019-03-25 NOTE — PROGRESS NOTES
Outpatient Care Management Note:    Received call from patient  Pt was previously enrolled in outpatient nurse care management and was closed as he was self managing  Pt reports he is currently in between insurances and reports makes too much for medical assistance  Pt reports is currently working for [facility] and they do offer insurance but deductible is very high and is looking for alternatives  Pt reports his grandson is helping him and is going to be following up with and  by phone to help him with choosing a plan  I made patient aware that our  may be able to assist him further if needed and spoke with patient about [facility] and the financial application/process  Pt report he plans on having insurance in April and will call should anything change or give updates  Pt is aware PCP office does not participate with all insurances and depending on what plan he gets may need to change PCP so may be a good question to ask when choosing a plan  Pt reports he got all his medication before his insurance ran out  Pt reports he is in need of his albuterol inhaler and was told by pharmacy [facility] would be a cheaper option for PCP to send  Message was put out by clinical staff already for provider to address  Pt is aware of [employer] for coupons for cheaper drug prices  Pt reports he is feeling well and denies any symptoms or complaints  Pt will call PCP office with further questions or concerns and with any changes to his insurance  Pt is aware will need follow up with PCP and cardiology  Patient does not have any further questions, concerns, or other needs at this time  Pt has my contact # and PCP office # if needed  Pt is agreeable for further outreach

## 2019-03-27 NOTE — PLAN OF CARE
CARDIOVASCULAR - ADULT     Maintains optimal cardiac output and hemodynamic stability Progressing        DISCHARGE PLANNING - CARE MANAGEMENT     Discharge to post-acute care or home with appropriate resources Progressing        GASTROINTESTINAL - ADULT     Maintains or returns to baseline bowel function Progressing        HEMATOLOGIC - ADULT     Maintains hematologic stability Progressing        Knowledge Deficit     Patient/family/caregiver demonstrates understanding of disease process, treatment plan, medications, and discharge instructions Progressing        METABOLIC, FLUID AND ELECTROLYTES - ADULT     Electrolytes maintained within normal limits Progressing     Fluid balance maintained Progressing        PAIN - ADULT     Verbalizes/displays adequate comfort level or baseline comfort level Progressing        Potential for Falls     Patient will remain free of falls Progressing        RESPIRATORY - ADULT     Achieves optimal ventilation and oxygenation Progressing        SAFETY ADULT     Patient will remain free of falls Progressing MD entered order- referral must be generated.

## 2019-04-10 ENCOUNTER — TELEPHONE (OUTPATIENT)
Dept: INTERNAL MEDICINE CLINIC | Facility: CLINIC | Age: 59
End: 2019-04-10

## 2019-04-10 DIAGNOSIS — J45.909 ASTHMA, UNSPECIFIED ASTHMA SEVERITY, UNSPECIFIED WHETHER COMPLICATED, UNSPECIFIED WHETHER PERSISTENT: Primary | ICD-10-CM

## 2019-04-10 RX ORDER — ALBUTEROL SULFATE 90 UG/1
2 AEROSOL, METERED RESPIRATORY (INHALATION) EVERY 6 HOURS PRN
Qty: 18 G | Refills: 0 | Status: SHIPPED | OUTPATIENT
Start: 2019-04-10 | End: 2019-04-11

## 2019-04-11 ENCOUNTER — TELEPHONE (OUTPATIENT)
Dept: INTERNAL MEDICINE CLINIC | Facility: CLINIC | Age: 59
End: 2019-04-11

## 2019-04-11 DIAGNOSIS — J45.909 ASTHMA, UNSPECIFIED ASTHMA SEVERITY, UNSPECIFIED WHETHER COMPLICATED, UNSPECIFIED WHETHER PERSISTENT: Primary | ICD-10-CM

## 2019-04-11 RX ORDER — ALBUTEROL SULFATE 90 UG/1
2 AEROSOL, METERED RESPIRATORY (INHALATION) EVERY 6 HOURS PRN
Qty: 8.5 G | Refills: 0 | Status: SHIPPED | OUTPATIENT
Start: 2019-04-11 | End: 2019-05-28 | Stop reason: SDUPTHER

## 2019-04-12 ENCOUNTER — TELEPHONE (OUTPATIENT)
Dept: INTERNAL MEDICINE CLINIC | Facility: CLINIC | Age: 59
End: 2019-04-12

## 2019-04-12 DIAGNOSIS — M54.12 CERVICAL RADICULOPATHY: ICD-10-CM

## 2019-04-12 RX ORDER — GABAPENTIN 300 MG/1
300 CAPSULE ORAL 3 TIMES DAILY
Qty: 90 CAPSULE | Refills: 3 | Status: SHIPPED | OUTPATIENT
Start: 2019-04-12 | End: 2019-10-17 | Stop reason: SDUPTHER

## 2019-04-25 DIAGNOSIS — F10.239 ALCOHOL DEPENDENCE WITH WITHDRAWAL WITH COMPLICATION (HCC): ICD-10-CM

## 2019-04-29 RX ORDER — FOLIC ACID 1 MG/1
TABLET ORAL
Qty: 30 TABLET | Refills: 1 | Status: SHIPPED | OUTPATIENT
Start: 2019-04-29 | End: 2019-07-05 | Stop reason: SDUPTHER

## 2019-05-02 NOTE — PLAN OF CARE
DISCHARGE PLANNING - CARE MANAGEMENT     Discharge to post-acute care or home with appropriate resources Progressing        GASTROINTESTINAL - ADULT     Maintains or returns to baseline bowel function Progressing     Establish and maintain optimal ostomy function Progressing        Nutrition/Hydration-ADULT     Nutrient/Hydration intake appropriate for improving, restoring or maintaining nutritional needs Progressing        Potential for Falls     Patient will remain free of falls Progressing        Prexisting or High Potential for Compromised Skin Integrity     Skin integrity is maintained or improved Progressing Communicable/Infectious

## 2019-05-17 DIAGNOSIS — J44.9 COPD (CHRONIC OBSTRUCTIVE PULMONARY DISEASE) (HCC): ICD-10-CM

## 2019-05-17 RX ORDER — ALBUTEROL SULFATE 2.5 MG/3ML
2.5 SOLUTION RESPIRATORY (INHALATION) EVERY 4 HOURS PRN
Qty: 150 ML | Refills: 3 | Status: SHIPPED | OUTPATIENT
Start: 2019-05-17 | End: 2019-08-12 | Stop reason: SDUPTHER

## 2019-05-24 DIAGNOSIS — J45.909 ASTHMA, UNSPECIFIED ASTHMA SEVERITY, UNSPECIFIED WHETHER COMPLICATED, UNSPECIFIED WHETHER PERSISTENT: ICD-10-CM

## 2019-05-28 ENCOUNTER — PATIENT OUTREACH (OUTPATIENT)
Dept: INTERNAL MEDICINE CLINIC | Facility: CLINIC | Age: 59
End: 2019-05-28

## 2019-05-28 DIAGNOSIS — J45.909 ASTHMA, UNSPECIFIED ASTHMA SEVERITY, UNSPECIFIED WHETHER COMPLICATED, UNSPECIFIED WHETHER PERSISTENT: ICD-10-CM

## 2019-05-29 RX ORDER — ALBUTEROL SULFATE 90 UG/1
2 AEROSOL, METERED RESPIRATORY (INHALATION) EVERY 6 HOURS PRN
Qty: 8.5 G | Refills: 0 | Status: SHIPPED | OUTPATIENT
Start: 2019-05-29 | End: 2019-08-12 | Stop reason: SDUPTHER

## 2019-07-05 DIAGNOSIS — F10.239 ALCOHOL DEPENDENCE WITH WITHDRAWAL WITH COMPLICATION (HCC): ICD-10-CM

## 2019-07-05 RX ORDER — FOLIC ACID 1 MG/1
1000 TABLET ORAL DAILY
Qty: 30 TABLET | Refills: 1 | Status: SHIPPED | OUTPATIENT
Start: 2019-07-05 | End: 2019-07-28 | Stop reason: SDUPTHER

## 2019-07-06 DIAGNOSIS — I25.10 CORONARY ARTERY DISEASE INVOLVING NATIVE CORONARY ARTERY OF NATIVE HEART WITHOUT ANGINA PECTORIS: ICD-10-CM

## 2019-07-06 RX ORDER — ATORVASTATIN CALCIUM 40 MG/1
40 TABLET, FILM COATED ORAL
Qty: 30 TABLET | Refills: 3 | Status: SHIPPED | OUTPATIENT
Start: 2019-07-06 | End: 2019-12-25 | Stop reason: SDUPTHER

## 2019-07-28 DIAGNOSIS — F10.239 ALCOHOL DEPENDENCE WITH WITHDRAWAL WITH COMPLICATION (HCC): ICD-10-CM

## 2019-07-30 RX ORDER — FOLIC ACID 1 MG/1
TABLET ORAL
Qty: 30 TABLET | Refills: 1 | Status: SHIPPED | OUTPATIENT
Start: 2019-07-30 | End: 2019-08-28 | Stop reason: SDUPTHER

## 2019-08-09 ENCOUNTER — OFFICE VISIT (OUTPATIENT)
Dept: INTERNAL MEDICINE CLINIC | Facility: CLINIC | Age: 59
End: 2019-08-09

## 2019-08-09 VITALS
BODY MASS INDEX: 23.31 KG/M2 | TEMPERATURE: 97.4 F | DIASTOLIC BLOOD PRESSURE: 74 MMHG | SYSTOLIC BLOOD PRESSURE: 110 MMHG | HEART RATE: 68 BPM | WEIGHT: 145.06 LBS | HEIGHT: 66 IN

## 2019-08-09 DIAGNOSIS — I25.10 CORONARY ARTERY DISEASE INVOLVING NATIVE CORONARY ARTERY OF NATIVE HEART WITHOUT ANGINA PECTORIS: Primary | ICD-10-CM

## 2019-08-09 DIAGNOSIS — Z13.1 SCREENING FOR DIABETES MELLITUS: ICD-10-CM

## 2019-08-09 DIAGNOSIS — K50.918 CROHN'S DISEASE WITH OTHER COMPLICATION, UNSPECIFIED GASTROINTESTINAL TRACT LOCATION (HCC): ICD-10-CM

## 2019-08-09 PROCEDURE — 99214 OFFICE O/P EST MOD 30 MIN: CPT | Performed by: INTERNAL MEDICINE

## 2019-08-09 RX ORDER — THIAMINE MONONITRATE (VIT B1) 100 MG
100 TABLET ORAL DAILY
Qty: 90 TABLET | Refills: 3 | Status: SHIPPED | OUTPATIENT
Start: 2019-08-09 | End: 2020-03-04 | Stop reason: ALTCHOICE

## 2019-08-09 NOTE — PROGRESS NOTES
[address]  INTERNAL MEDICINE  [address] [place] [address], [facility]    NAME: Irma Grimes  AGE: 64 y o  SEX: male    DATE OF ENCOUNTER: 8/9/2019    Assessment and Plan     1  Crohn's disease with other complication, unspecified gastrointestinal tract location [facility])  -Patient is on sulfasalazine 500mg Q6H  -Has a h/p perforation with a ileostomy that has now been reversed  -Denies any abdominal pain, change in bowel habits  -Patient gets yearly colonoscopies with gastroenterology  - thiamine (VITAMIN B1) 100 mg tablet; Take 1 tablet (100 mg total) by mouth daily  Dispense: 90 tablet; Refill: 3    2  Coronary artery disease involving native coronary artery of native heart without angina pectoris  - Patient had an MI in 2012 with stent to the RCA  - Denies any chest pain, palpitations, shortness of breath  - CBC and differential; Future  - Comprehensive metabolic panel; Future      3  Screening for diabetes mellitus  - HEMOGLOBIN A1C W/ EAG ESTIMATION; Future  - Lipid Panel with Direct LDL reflex; Future      Orders Placed This Encounter   Procedures    CBC and differential    Comprehensive metabolic panel    HEMOGLOBIN A1C W/ EAG ESTIMATION    Lipid Panel with Direct LDL reflex       - Counseling Documentation: patient was counseled regarding: diagnostic results, instructions for management, risk factor reductions, prognosis, patient and family education, impressions, risks and benefits of treatment options and importance of compliance with treatment  - Counseling Time: not applicable  - Barriers to treatment: None  - Medication Side Effects: Adverse side effects of medications were reviewed with the patient/guardian today    - Self Referrals: No    Chief Complaint     Chief Complaint   Patient presents with    Follow-up     routine - hypertension and other chronic conditions        History of Present Illness     Patient is a 57-year-old male with a past medical history of coronary artery disease status post stent to the RCA in 2012, history of Crohn's disease with colitis and perforation 2 years ago for which he underwent an ileostomy that was subsequently reversed  Patient has a history of hypertension for which he was on lisinopril and metoprolol which has now been discontinued because he was becoming hypotensive  Today patient is here for routine follow-up  He denies any symptoms  He does not experience chest pain, palpitations  He does not experience abdominal pain, nausea, vomiting, any change in bowel habits  He says he experiences shortness of breath after walking for prolonged distance  He continues to smoke 2 cigars a day  Patient gets yearly colonoscopies with Gastroenterology  The following portions of the patient's history were reviewed and updated as appropriate: allergies, current medications, past family history, past medical history, past social history, past surgical history and problem list     Review of Systems     Review of Systems   Respiratory: Positive for shortness of breath  All other systems reviewed and are negative        Active Problem List     Patient Active Problem List   Diagnosis    Crohn disease ([facility] [address] )    Spinal stenosis of cervical region    Pericarditis    Coronary artery disease involving native coronary artery of native heart without angina pectoris    Asthma    Atherosclerosis of coronary artery    COPD (chronic obstructive pulmonary disease) ([address] )    Dyslipidemia    Alcohol abuse    Cigarette nicotine dependence without complication    Alcohol dependence with withdrawal ([address] )    Acute respiratory insufficiency    Agitation    Heart failure, systolic, due to idiopathic cardiomyopathy ([address] )    Ileostomy in place ([address] )    Moderate protein-calorie malnutrition ([address] )    Anemia    Hypocalcemia    Perforation of colon ([address] )    Hypotension    Intra-abdominal abscess ([address] )    DEBBY (acute kidney injury) ([address] )  Abdominal pain    Ambulatory dysfunction    Presence of drug-eluting stent in right coronary artery    Paroxysmal atrial fibrillation (HCC)    Hypercalcemia    Hematemesis with nausea    Status post reversal of ileostomy       Objective     /74   Pulse 68   Temp (!) 97 4 °F (36 3 °C)   Ht 5' 6" (1 676 m)   Wt 65 8 kg (145 lb 1 oz)   BMI 23 41 kg/m²     Physical Exam:   GENERAL: NAD  HEENT:  NC/AT, MMM, no scleral icterus  CARDIAC:  RRR, +S1/S2, no S3/S4 heard, no m/g/r  PULMONARY:  CTA B/L, no wheezing/rales/rhonci, non-labored breathing  ABDOMEN:  Soft, NT/ND, +BS, no rebound/guarding/rigidity  Extremities:  2+ Pulses in DP/PT  No edema, cyanosis, or clubbing  NEUROLOGIC:  Alert/oriented x3     SKIN:  No rashes or erythema      Current Medications     Current Outpatient Medications:     albuterol (2 5 mg/3 mL) 0 083 % nebulizer solution, Take 1 vial (2 5 mg total) by nebulization every 4 (four) hours as needed for wheezing as needed for wheezing, Disp: 150 mL, Rfl: 3    albuterol (PROAIR HFA) 90 mcg/act inhaler, Inhale 2 puffs every 6 (six) hours as needed for wheezing, Disp: 8 5 g, Rfl: 0    Albuterol Sulfate (PROAIR RESPICLICK) 543 (90 Base) MCG/ACT AEPB, Inhale 1 puff every 4 (four) hours as needed (SOB), Disp: 1 each, Rfl: 3    amiodarone 200 mg tablet, amiodarone 200 mg tablet, Disp: , Rfl:     aspirin 81 mg chewable tablet, Chew 1 tablet (81 mg total) daily, Disp: 30 tablet, Rfl: 0    atorvastatin (LIPITOR) 40 mg tablet, TAKE 1 TABLET (40 MG TOTAL) BY MOUTH DAILY WITH DINNER, Disp: 30 tablet, Rfl: 3    folic acid (FOLVITE) 1 mg tablet, TAKE 1 TABLET BY MOUTH EVERY DAY, Disp: 30 tablet, Rfl: 1    gabapentin (NEURONTIN) 300 mg capsule, Take 1 capsule (300 mg total) by mouth 3 (three) times a day, Disp: 90 capsule, Rfl: 3    sulfaSALAzine (AZULFIDINE) 500 mg tablet, Every 6 hours, Disp: , Rfl:     thiamine (VITAMIN B1) 100 mg tablet, Take 1 tablet (100 mg total) by mouth daily, Disp: 90 tablet, Rfl: 3    lisinopril (ZESTRIL) 2 5 mg tablet, lisinopril 2 5 mg tablet, Disp: , Rfl:     Nebulizers (AERONEB GO NEBULIZER HANDSET) MISC, by Does not apply route 2 (two) times a day as needed (wheezing), Disp: 1 each, Rfl: 0    Health Maintenance     Health Maintenance   Topic Date Due    Pneumococcal Vaccine: Pediatrics (0 to 5 Years) and At-Risk Patients (6 to 59 Years) (2 of 3 - PPSV23) 02/02/2017    Depression Screening PHQ  07/31/2019    INFLUENZA VACCINE  10/08/2019 (Originally 7/1/2019)    BMI: Adult  08/09/2020    Pneumococcal Vaccine: 65+ Years (2 of 2 - PPSV23) 05/31/2027    DTaP,Tdap,and Td Vaccines (2 - Td) 07/10/2027    CRC Screening: Colonoscopy  10/04/2028    Hepatitis C Screening  Completed    HEPATITIS B VACCINES  Aged Out     Immunization History   Administered Date(s) Administered    INFLUENZA 10/21/2016, 10/07/2018    Influenza Quadrivalent, 6-35 Months IM 10/21/2016    Influenza TIV (IM) 09/29/2015    Influenza, recombinant, quadrivalent,injectable, preservative free 10/07/2018    Pneumococcal Conjugate 13-Valent 12/08/2016    Tdap 07/10/2017       Tera Durham  8/9/2019 10:53 AM        PHQ-9 Depression Screening    PHQ-9:    Frequency of the following problems over the past two weeks:       Little interest or pleasure in doing things:  0 - not at all  Feeling down, depressed, or hopeless:  0 - not at all  PHQ-2 Score:  0

## 2019-08-12 DIAGNOSIS — J44.9 COPD (CHRONIC OBSTRUCTIVE PULMONARY DISEASE) (HCC): ICD-10-CM

## 2019-08-12 DIAGNOSIS — J45.909 ASTHMA, UNSPECIFIED ASTHMA SEVERITY, UNSPECIFIED WHETHER COMPLICATED, UNSPECIFIED WHETHER PERSISTENT: ICD-10-CM

## 2019-08-12 RX ORDER — ALBUTEROL SULFATE 2.5 MG/3ML
2.5 SOLUTION RESPIRATORY (INHALATION) EVERY 4 HOURS PRN
Qty: 150 ML | Refills: 3 | Status: SHIPPED | OUTPATIENT
Start: 2019-08-12 | End: 2019-10-14 | Stop reason: SDUPTHER

## 2019-08-12 RX ORDER — ALBUTEROL SULFATE 90 UG/1
2 AEROSOL, METERED RESPIRATORY (INHALATION) EVERY 6 HOURS PRN
Qty: 8.5 G | Refills: 0 | Status: SHIPPED | OUTPATIENT
Start: 2019-08-12 | End: 2019-08-28 | Stop reason: SDUPTHER

## 2019-08-12 NOTE — TELEPHONE ENCOUNTER
Patient called me requesting refills on meds  Patient reports he is completely out and does a daily albuterol treatment  Patient reports has been feeling short of breath today but no in any distress and can hold a conversation on the phone

## 2019-08-28 DIAGNOSIS — J45.909 ASTHMA, UNSPECIFIED ASTHMA SEVERITY, UNSPECIFIED WHETHER COMPLICATED, UNSPECIFIED WHETHER PERSISTENT: ICD-10-CM

## 2019-08-28 DIAGNOSIS — F10.239 ALCOHOL DEPENDENCE WITH WITHDRAWAL WITH COMPLICATION (HCC): ICD-10-CM

## 2019-08-28 RX ORDER — FOLIC ACID 1 MG/1
TABLET ORAL
Qty: 30 TABLET | Refills: 1 | Status: SHIPPED | OUTPATIENT
Start: 2019-08-28 | End: 2019-09-22 | Stop reason: SDUPTHER

## 2019-08-28 RX ORDER — ALBUTEROL SULFATE 90 UG/1
2 AEROSOL, METERED RESPIRATORY (INHALATION) EVERY 6 HOURS PRN
Qty: 8.5 G | Refills: 1 | Status: SHIPPED | OUTPATIENT
Start: 2019-08-28 | End: 2019-09-29 | Stop reason: SDUPTHER

## 2019-08-28 NOTE — TELEPHONE ENCOUNTER
Patient called in requesting refills  He is not completely out but he would like to have additional refills available so he does not have to continue calling in every month   Please send in more than 1 refill

## 2019-09-22 DIAGNOSIS — F10.239 ALCOHOL DEPENDENCE WITH WITHDRAWAL WITH COMPLICATION (HCC): ICD-10-CM

## 2019-09-23 RX ORDER — FOLIC ACID 1 MG/1
TABLET ORAL
Qty: 30 TABLET | Refills: 1 | Status: SHIPPED | OUTPATIENT
Start: 2019-09-23 | End: 2019-10-27 | Stop reason: SDUPTHER

## 2019-09-29 DIAGNOSIS — J45.909 ASTHMA, UNSPECIFIED ASTHMA SEVERITY, UNSPECIFIED WHETHER COMPLICATED, UNSPECIFIED WHETHER PERSISTENT: ICD-10-CM

## 2019-09-30 RX ORDER — ALBUTEROL SULFATE 90 UG/1
AEROSOL, METERED RESPIRATORY (INHALATION)
Qty: 8.5 INHALER | Refills: 1 | Status: SHIPPED | OUTPATIENT
Start: 2019-09-30 | End: 2019-10-28 | Stop reason: SDUPTHER

## 2019-10-14 DIAGNOSIS — J44.9 COPD (CHRONIC OBSTRUCTIVE PULMONARY DISEASE) (HCC): ICD-10-CM

## 2019-10-14 RX ORDER — ALBUTEROL SULFATE 2.5 MG/3ML
2.5 SOLUTION RESPIRATORY (INHALATION) EVERY 4 HOURS PRN
Qty: 150 ML | Refills: 3 | Status: SHIPPED | OUTPATIENT
Start: 2019-10-14 | End: 2020-01-17 | Stop reason: SDUPTHER

## 2019-10-15 RX ORDER — ALBUTEROL SULFATE 2.5 MG/3ML
SOLUTION RESPIRATORY (INHALATION)
Qty: 150 ML | Refills: 3 | OUTPATIENT
Start: 2019-10-15

## 2019-10-17 DIAGNOSIS — M54.12 CERVICAL RADICULOPATHY: ICD-10-CM

## 2019-10-17 RX ORDER — GABAPENTIN 300 MG/1
CAPSULE ORAL
Qty: 90 CAPSULE | Refills: 3 | OUTPATIENT
Start: 2019-10-17

## 2019-10-17 RX ORDER — GABAPENTIN 300 MG/1
300 CAPSULE ORAL 3 TIMES DAILY
Qty: 90 CAPSULE | Refills: 3 | Status: SHIPPED | OUTPATIENT
Start: 2019-10-17 | End: 2020-04-30

## 2019-10-17 NOTE — TELEPHONE ENCOUNTER
Received call from patient asking why his refill for Gabapentin was denied patient reports he is taking it for his cervical radiculopathy and it is helping him and his PCP before Dr Nazia Schumacher was prescribing it to him  Patient reports he has seen Neurosurgery in the past but no surgery  Patient is taking it three times a day and reports sometimes only takes it twice a day  Patient requesting refill  Patient is aware I would put request out again for PCP to further address

## 2019-10-27 DIAGNOSIS — F10.239 ALCOHOL DEPENDENCE WITH WITHDRAWAL WITH COMPLICATION (HCC): ICD-10-CM

## 2019-10-28 DIAGNOSIS — J45.909 ASTHMA, UNSPECIFIED ASTHMA SEVERITY, UNSPECIFIED WHETHER COMPLICATED, UNSPECIFIED WHETHER PERSISTENT: ICD-10-CM

## 2019-10-30 RX ORDER — ALBUTEROL SULFATE 90 UG/1
AEROSOL, METERED RESPIRATORY (INHALATION)
Qty: 8.5 INHALER | Refills: 1 | Status: SHIPPED | OUTPATIENT
Start: 2019-10-30 | End: 2019-12-29 | Stop reason: SDUPTHER

## 2019-10-30 RX ORDER — FOLIC ACID 1 MG/1
TABLET ORAL
Qty: 30 TABLET | Refills: 1 | Status: SHIPPED | OUTPATIENT
Start: 2019-10-30 | End: 2020-01-05

## 2019-11-26 ENCOUNTER — PATIENT OUTREACH (OUTPATIENT)
Dept: INTERNAL MEDICINE CLINIC | Facility: CLINIC | Age: 59
End: 2019-11-26

## 2019-11-26 NOTE — PROGRESS NOTES
Outpatient Care Management Note:    Received call from patient that he tried scheduling a follow up with Dr John Miranda (colon-rectal) but his office does not accept patient's insurance  Patient asking where he can be seen  I advised patient to contact his insurance and get a list of colon rectal physicians in the area that participate with his insurance  Patient is concerned as he does not want to run out of his sulfasalazine that Dr John Miranda has been prescribing to him  Patient also concerned about cardiologist not accepting his insurance  I made patient aware insurance can tell him if they participate with his cardiology office and if not they can provide him with a list of cardiologist in the area that do participate with his insurance  Patient reports understanding and does not have further questions or concerns at this time  He will call PCP office if he needs further assistance  Will not open to complex care management at this time as patient is self managing as was previously enrolled

## 2019-12-08 DIAGNOSIS — J44.9 COPD (CHRONIC OBSTRUCTIVE PULMONARY DISEASE) (HCC): ICD-10-CM

## 2019-12-09 RX ORDER — ALBUTEROL SULFATE 2.5 MG/3ML
SOLUTION RESPIRATORY (INHALATION)
Qty: 150 ML | Refills: 3 | Status: SHIPPED | OUTPATIENT
Start: 2019-12-09 | End: 2020-02-11 | Stop reason: SDUPTHER

## 2019-12-25 DIAGNOSIS — I25.10 CORONARY ARTERY DISEASE INVOLVING NATIVE CORONARY ARTERY OF NATIVE HEART WITHOUT ANGINA PECTORIS: ICD-10-CM

## 2019-12-25 RX ORDER — ATORVASTATIN CALCIUM 40 MG/1
40 TABLET, FILM COATED ORAL
Qty: 90 TABLET | Refills: 1 | Status: SHIPPED | OUTPATIENT
Start: 2019-12-25 | End: 2020-07-13 | Stop reason: SDUPTHER

## 2019-12-29 DIAGNOSIS — J45.909 ASTHMA, UNSPECIFIED ASTHMA SEVERITY, UNSPECIFIED WHETHER COMPLICATED, UNSPECIFIED WHETHER PERSISTENT: ICD-10-CM

## 2020-01-02 RX ORDER — ALBUTEROL SULFATE 90 UG/1
AEROSOL, METERED RESPIRATORY (INHALATION)
Qty: 8.5 INHALER | Refills: 1 | Status: SHIPPED | OUTPATIENT
Start: 2020-01-02 | End: 2020-02-27

## 2020-01-05 DIAGNOSIS — F10.239 ALCOHOL DEPENDENCE WITH WITHDRAWAL WITH COMPLICATION (HCC): ICD-10-CM

## 2020-01-05 RX ORDER — FOLIC ACID 1 MG/1
TABLET ORAL
Qty: 30 TABLET | Refills: 0 | Status: SHIPPED | OUTPATIENT
Start: 2020-01-05

## 2020-01-17 ENCOUNTER — PATIENT OUTREACH (OUTPATIENT)
Dept: INTERNAL MEDICINE CLINIC | Facility: CLINIC | Age: 60
End: 2020-01-17

## 2020-01-17 DIAGNOSIS — J44.9 COPD (CHRONIC OBSTRUCTIVE PULMONARY DISEASE) (HCC): ICD-10-CM

## 2020-01-17 RX ORDER — ALBUTEROL SULFATE 2.5 MG/3ML
2.5 SOLUTION RESPIRATORY (INHALATION) EVERY 4 HOURS PRN
Qty: 150 ML | Refills: 0 | Status: SHIPPED | OUTPATIENT
Start: 2020-01-17 | End: 2020-01-27 | Stop reason: SDUPTHER

## 2020-01-17 NOTE — TELEPHONE ENCOUNTER
Patient called requesting refill on his albuterol solution for his nebulizer  I confirmed with pharmacy he does not have any refills left and script that was sent 12/9 with refills was filled on 12/9, 12/9, and 12/28  Patient reports he is using his last vial today and is requesting if script could be sent today so he can go this afternoon to pick it up  Patient is a self pay patient for his medication

## 2020-01-17 NOTE — PROGRESS NOTES
Outpatient Care Management Note:    Received a call from patient that he needs a refill on his albuterol solution for his nebulizer  Patient reports he called PCP office and was told it was at the pharmacy but pharmacy did not receive a prescription  I noted that script was sent 12/9/19 and when I called pharmacy patient filled it on 12/9, 12/19, and 12/28 and does not have any more refills  Patient reports he uses his nebulizer 2-3 times a day and rarely 4 times a day  Patient reports he gets 2 boxes at a time and each box has 25 vials of albuterol (so this lasts him about 16-25 days based on use)  Patient reports he only uses his albuterol inhaler to carry with him when he is not at home  Patient does have COPD and has not followed up with a pulmonologist and did not have insurance for a long period of time  Patient now has insurance but no prescription plan  Patient uses [employer] for discount coupons for his medications  I did make patient aware the covering doctor for his PCP sent in a script to pharmacy for 2 boxes for his albuterol solution but no refills  Patient has a follow up with PCP on 3/4/2020 which this script will not last him until then  I did talk with patient about possibly needs another inhaler to help manage his COPD better  Patient concerned about cost  Could check with Danna Hollingsworth with the medication program if patient would qualify or getting patient into a pulmonary office that would have samples of inhalers  Patient will call insurance to see what pulmonary offices he could go to as he needs to find a cardiologist and colon-rectal doctor to follow up with as well he reports  Will ask PCP if patient can wait until March appointment and if he would refill albuterol solution in the meantime or would prefer he follow up sooner  Patient did state he would be agreeable to coming in sooner if needed since he knows we accept his new insurance but only wants to see Dr Laray Carrel       Patient was previously open to outpatient nurse care management

## 2020-01-21 NOTE — PROGRESS NOTES
I think he should follow up with us sooner than march and be started on a long acting inhaler, if he can get in with pulmonology before march as well that would be great because I know they have samples to give patients

## 2020-01-24 DIAGNOSIS — J43.8 OTHER EMPHYSEMA (HCC): Primary | ICD-10-CM

## 2020-01-24 NOTE — PROGRESS NOTES
Called and spoke with patient  I made him aware PCP is recommending he try to get in to see pulmonary  Patient reports he did call his insurance and [facility] doctor's are on the list  I gave him the phone number and told him to call the their office and let them know PCP referred him for his COPD  I did place order for pulmonary  Patient also reports he has made an appointment with colon rectal for her chron's disease and reports physician he had been seeing does accept his insurance  I did advise him to schedule sooner follow up appointment with PCP office and not wait until March to be seen  Patient wanted to see when he could see pulmonary first      Patient does have insurance but no prescription coverage and pays out of pocket for medication and uses [employer] for discount on meds

## 2020-01-27 ENCOUNTER — PATIENT OUTREACH (OUTPATIENT)
Dept: INTERNAL MEDICINE CLINIC | Facility: CLINIC | Age: 60
End: 2020-01-27

## 2020-01-27 DIAGNOSIS — J44.9 COPD (CHRONIC OBSTRUCTIVE PULMONARY DISEASE) (HCC): Primary | ICD-10-CM

## 2020-01-27 DIAGNOSIS — J44.9 COPD (CHRONIC OBSTRUCTIVE PULMONARY DISEASE) (HCC): ICD-10-CM

## 2020-01-27 RX ORDER — ALBUTEROL SULFATE 2.5 MG/3ML
2.5 SOLUTION RESPIRATORY (INHALATION) EVERY 4 HOURS PRN
Qty: 150 ML | Refills: 0 | Status: SHIPPED | OUTPATIENT
Start: 2020-01-27 | End: 2020-02-11

## 2020-01-27 NOTE — TELEPHONE ENCOUNTER
Patient requesting refill and states he has 6-7 vials left  Patient is scheduled to see pulmonary on 2/11  Please see patient outreach note from 1/17 and 1/27 for more info

## 2020-01-27 NOTE — TELEPHONE ENCOUNTER
Les Hall  I did see the note that he wanted to see pulm first  I think that's okay   I will provide him with the refills

## 2020-01-27 NOTE — PROGRESS NOTES
Called and spoke with patient and made him aware order for chest xray was placed and that he can walk in at the hospital to have this done and that they will be able to see order in computer  Patient also made aware refill for albuterol was sent to pharmacy and importance of seeing pulmonary  Patient reports understanding

## 2020-01-27 NOTE — PROGRESS NOTES
Outpatient Care Management Note:    Received call from patient that he did schedule an appointment with [facility] for 2/11 and will need to have chest xray done before this appointment  Patient aware I will put message out for PCP to order and address refill of his albuterol solution  Patient prefers at this time to further follow up with pulmonary for further management of his COPD but will run out of his albuterol until that appointment  Patient states he currently has 6-7 vials left  Patient gets 50 vials at a time and states he uses 2-3 a day and sometimes 4 times a day   (Please see patient outreach encounter from 1/17/2020 for more information)

## 2020-01-29 ENCOUNTER — PATIENT OUTREACH (OUTPATIENT)
Dept: INTERNAL MEDICINE CLINIC | Facility: CLINIC | Age: 60
End: 2020-01-29

## 2020-01-29 DIAGNOSIS — K50.918 CROHN'S DISEASE WITH OTHER COMPLICATION, UNSPECIFIED GASTROINTESTINAL TRACT LOCATION (HCC): Primary | ICD-10-CM

## 2020-01-29 NOTE — PROGRESS NOTES
Outpatient Care Management Note:    Received message from patient requesting to speak with him regarding his insurance and colon-rectal appointment  I returned phone call to patient  He reports he had an appointment at Dr Juárez's office yesterday and they do not accept his insurance at the office and was instructed he needs to be seen by Dr Zach Heerdia at the colon-rectal clinic at [facility]  Patient needs order placed to see colon-rectal before clerical staff can schedule him an appointment  Patient reports he prefers to follow up and see Dr Zoey Rose for his chron's disease and is prescribing him sulfasalazine 500 mg  Patient does not recall seeing a GI doctor  (Dr Zoey Rose comes last Monday of each month and next available appointment as of now is March 23rd ) I did remind patient that he does have appointment with PCP on 3/4/20 but that I would put message out for his PCP to further address this or see if he wants to wait and further discuss at his upcoming appointment  Patient reports understanding

## 2020-02-07 ENCOUNTER — TRANSCRIBE ORDERS (OUTPATIENT)
Dept: RADIOLOGY | Facility: HOSPITAL | Age: 60
End: 2020-02-07

## 2020-02-07 ENCOUNTER — HOSPITAL ENCOUNTER (OUTPATIENT)
Dept: RADIOLOGY | Facility: HOSPITAL | Age: 60
Discharge: HOME | End: 2020-02-07
Payer: COMMERCIAL

## 2020-02-07 DIAGNOSIS — J44.9 COPD (CHRONIC OBSTRUCTIVE PULMONARY DISEASE) (HCC): ICD-10-CM

## 2020-02-07 PROCEDURE — 71046 X-RAY EXAM CHEST 2 VIEWS: CPT

## 2020-02-11 ENCOUNTER — OFFICE VISIT (OUTPATIENT)
Dept: PULMONOLOGY | Facility: CLINIC | Age: 60
End: 2020-02-11
Payer: COMMERCIAL

## 2020-02-11 VITALS
DIASTOLIC BLOOD PRESSURE: 70 MMHG | OXYGEN SATURATION: 92 % | SYSTOLIC BLOOD PRESSURE: 110 MMHG | TEMPERATURE: 97.7 F | HEIGHT: 66 IN | WEIGHT: 146 LBS | HEART RATE: 95 BPM | BODY MASS INDEX: 23.46 KG/M2

## 2020-02-11 DIAGNOSIS — J43.8 OTHER EMPHYSEMA (HCC): ICD-10-CM

## 2020-02-11 DIAGNOSIS — F17.210 CIGARETTE NICOTINE DEPENDENCE WITHOUT COMPLICATION: ICD-10-CM

## 2020-02-11 DIAGNOSIS — J44.9 STAGE 4 VERY SEVERE COPD BY GOLD CLASSIFICATION (HCC): ICD-10-CM

## 2020-02-11 DIAGNOSIS — J44.9 COPD, SEVERE (HCC): Primary | ICD-10-CM

## 2020-02-11 DIAGNOSIS — J44.9 COPD (CHRONIC OBSTRUCTIVE PULMONARY DISEASE) (HCC): ICD-10-CM

## 2020-02-11 PROBLEM — F10.10 ALCOHOL ABUSE: Status: RESOLVED | Noted: 2018-05-15 | Resolved: 2020-02-11

## 2020-02-11 PROBLEM — F10.239 ALCOHOL DEPENDENCE WITH WITHDRAWAL (HCC): Status: RESOLVED | Noted: 2018-05-17 | Resolved: 2020-02-11

## 2020-02-11 PROCEDURE — 3008F BODY MASS INDEX DOCD: CPT | Performed by: INTERNAL MEDICINE

## 2020-02-11 PROCEDURE — 94010 BREATHING CAPACITY TEST: CPT | Performed by: INTERNAL MEDICINE

## 2020-02-11 PROCEDURE — 4004F PT TOBACCO SCREEN RCVD TLK: CPT | Performed by: INTERNAL MEDICINE

## 2020-02-11 PROCEDURE — 3074F SYST BP LT 130 MM HG: CPT | Performed by: INTERNAL MEDICINE

## 2020-02-11 PROCEDURE — 99406 BEHAV CHNG SMOKING 3-10 MIN: CPT | Performed by: INTERNAL MEDICINE

## 2020-02-11 PROCEDURE — 99245 OFF/OP CONSLTJ NEW/EST HI 55: CPT | Performed by: INTERNAL MEDICINE

## 2020-02-11 PROCEDURE — 3078F DIAST BP <80 MM HG: CPT | Performed by: INTERNAL MEDICINE

## 2020-02-11 RX ORDER — ALBUTEROL SULFATE 2.5 MG/3ML
2.5 SOLUTION RESPIRATORY (INHALATION) EVERY 4 HOURS
Qty: 180 VIAL | Refills: 3 | Status: SHIPPED | OUTPATIENT
Start: 2020-02-11 | End: 2020-07-27 | Stop reason: SDUPTHER

## 2020-02-11 RX ORDER — ALBUTEROL SULFATE 2.5 MG/3ML
2.5 SOLUTION RESPIRATORY (INHALATION) EVERY 4 HOURS
Qty: 180 VIAL | Refills: 3 | Status: SHIPPED | OUTPATIENT
Start: 2020-02-11 | End: 2020-02-11 | Stop reason: SDUPTHER

## 2020-02-11 NOTE — PROGRESS NOTES
Pulmonary Consultation   Nathalia Mosqueda 64 y o  male MRN: [id number]  2/11/2020      Referring provider: Dr Dave Rodrigues  Reason for consult: COPD    Assessment and Plan:  Stage 4 very severe COPD by GOLD classification ([facility] )  - Disease is not optimally controlled due to poor access to inhalers due to cost and no pharmacy benefit to insurance coverage  - Start ICS/LABA/LAMA (Trelegy)  Provided patient with samples that should last him for 2 months  Also provided him with paperwork to see if he qualifies for payment assistance through the [employer]   - he will need to continue to use Albuterol nebulizer as needed  He can also add an Ipratropium nebulizer  I provided him with prescriptions for both of these medications and adjusted the frequency of use and amount dispensed so he should a more adequate supply of medication vitals each month  - Additionally, these medications are on the $4 medication list from [employer]  I provided him with [employer]'s phone number  He will call to see if he qualifies to receive the medications at that price    - Finally, I provided him with 2 coupons that can be used for patient's that have commercial insurance to receive a discounted inhaler   - his lung disease is very advanced  It is in fact end-stage  He has a high mortality over the next 4 years  We reviewed this together  We briefly discussed lung transplantation  He is not currently a candidate since he is using tobacco    - he should maintain a high activity level as able  Pulmonary rehab will be cost prohibitive for him; therefore, I would like him to remain active  Cigarette nicotine dependence without complication  - we discussed tobacco cessation for 8 minutes  He is interested but not committed or motivated to quit at this time  We did discuss the deleterious effects of tobacco use on his body    We also discussed that he is not eligible for lung transplantation as long as he continues to abuse tobacco   He recognizes that he uses tobacco to relieve stress  He states he has a lot of stress in his home life  - he qualifies for screening CT scan given his tobacco history; however, his chest x-ray cost him $100 out of pocket  We will readdress the CT scan at his next visit as [facility] does have a program for assistance to receive a CT scan for $49       Diagnoses and all orders for this visit:    COPD, severe ([facility] [facility] )  -     POCT spirometry  -     Discontinue: albuterol (2 5 mg/3 mL) 0 083 % nebulizer solution; Take 1 vial (2 5 mg total) by nebulization every 4 (four) hours  -     Discontinue: ipratropium (ATROVENT) 0 02 % nebulizer solution; Take 1 vial (0 5 mg total) by nebulization 4 (four) times a day  -     albuterol (2 5 mg/3 mL) 0 083 % nebulizer solution; Take 1 vial (2 5 mg total) by nebulization every 4 (four) hours  -     ipratropium (ATROVENT) 0 02 % nebulizer solution; Take 1 vial (0 5 mg total) by nebulization 4 (four) times a day    Other emphysema ([facility] )  -     Ambulatory referral to Pulmonology    COPD (chronic obstructive pulmonary disease) ([facility] [address] [facility] )    Stage 4 very severe COPD by GOLD classification ([facility] )    Cigarette nicotine dependence without complication      Thank you for this referral   I would like him to follow up in 1 month's time so I know how he is doing  However, he states that he was just told that his insurance only pays for 3 doctor visits per year and this is his 2nd doctor visit in 2020  He is going to call his insurance to see if he can change plans and to make sure he understands the plan correctly  History of Present Illness   HPI:  Patrice Steve is a 64 y o  male who presents for evaluation of shortness of breath  He has a diagnosis of COPD; however, he has never had spirometry or pulmonary function testing  He has significant breathlessness  He is currently using an Albuterol nebulizer as needed, which is about three times daily   He also has an Albuterol inhaler to use as needed  He uses it about 10-20 times per week  He has been on long-acting inhalers in the past  However, his insurance company kept changing their coverage plan, so he stopped using them  His current insurance plan does not have a pharmacy benefit  He has felt short of breath for many years  He is able to ambulate a few feet and then has to stop to catch his breath  He has a chronic cough that produces clear sputum  Louise Motta smokes 2-3 cigars daily  He inhales when he smokes them  He smoked cigarettes when he was younger  He quit in his 45s  He used to smoke 1 ppd x 25 years  He uses nocturnal oxygen as needed, which is about 4-5 times per week  His daughter and her children live with him  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  He gets paid for this job      Review of Systems  GEN: no fever or chills  HENT: no sinus congestion, no postnasal drip, no rhinorrhea  EYES: no visual changes  RESP: +shortness of breath, +cough, no wheezing  CARDIO: no chest pain, no leg edema  GI: no abdominal pain, no diarrhea  ENDO:  no polydipsia  : no urgency, no dysuria  MSK: no back pain  ALLERGY: not immunocompromised  NEURO: no dizziness, no seizures  HEME: does not bruise easily  PSYCH: no hallucinations    Historical Information   Past Medical History:   Diagnosis Date    Anxiety     Asthma     Cardiac disease     Cervical stenosis (uterine cervix)     Chest pain     Chronic kidney disease     Colitis     Colon polyps     COPD (chronic obstructive pulmonary disease) (HCC)     2L @ HS and PRN    Coronary artery disease     Diverticulitis     Esophageal reflux     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HCC)     Hyperlipidemia     Hypertension     IBD (inflammatory bowel disease)     Myocardial infarction ([facility] )     Old myocardial infarction     Perforation of colon (HCC)     Type 2 diabetes, diet controlled ([facility] )     Ulcerative colitis ([facility] )      Past Surgical History: Procedure Laterality Date    ANGIOPLASTY      APPENDECTOMY      COLON SURGERY      COLONOSCOPY N/A 10/24/2016    Procedure: COLONOSCOPY;  Surgeon: Rj Bhatti MD;  [facility]: [facility]; Service:     CORONARY ANGIOPLASTY WITH STENT PLACEMENT      ESOPHAGOGASTRODUODENOSCOPY N/A 10/24/2016    Procedure: ESOPHAGOGASTRODUODENOSCOPY (EGD); Surgeon: Rj Bhatti MD;  [facility]; Service:     EXPLORATORY LAPAROTOMY W/ BOWEL RESECTION N/A 5/22/2018    Procedure: EXPLORATORY LAPAROTOMY, ILIOCOLECTOMY, ILIOCOLONIC ANASTAMOSIS, LOOP ILIOSTOMY, REPAIR OF SEROSAL TEAR, EXTENSIVE LYSIS OF ADHESIONS, WOUND VAC PLACEMENT;  Surgeon: Rj Bhatti MD;  [facility];  Service: Colorectal    HEMICOLECTOMY      ILEOSTOMY CLOSURE N/A 10/5/2018    Procedure: Izell Mode;  Surgeon: Rj Bhatti MD;  [facility];  Service: Colorectal    OTHER SURGICAL HISTORY      stent indications acute myocardial infarction    FL COLONOSCOPY FLX DX W/COLLJ SPEC WHEN PFRMD N/A 2/6/2018    Procedure: COLONOSCOPY;  Surgeon: Rj Bhatti MD;  [facility]; Service: Colorectal    FL COLONOSCOPY FLX DX W/COLLJ SPEC WHEN PFRMD N/A 10/4/2018    Procedure: COLONOSCOPY;  Surgeon: Rj Bhatti MD;  [facility]; Service: Colorectal    TONSILLECTOMY       Family History   Problem Relation Age of Onset    Coronary artery disease Father     Crohn's disease Father     Stroke Father     Diabetes Family        Occupational History: Used to work as a     Social History: Lives with his daughter and grandchildren  Pets: 2 dogs, 5 cats, bird  Quit drinking alcohol 1 year ago - used to drink 1 bottle of kenji daily, drank 1 case of beer every 2-3 days before that  He drank heavily for about 35 years      Meds/Allergies     Current Outpatient Medications:     albuterol (2 5 mg/3 mL) 0 083 % nebulizer solution, INHALE ONE VIAL VIA NEBULIZER EVERY FOUR HOURS AS NEEDED FOR WHEEZING, Disp: 150 mL, Rfl: 3    albuterol (PROVENTIL HFA,VENTOLIN HFA) 90 mcg/act inhaler, TAKE 2 PUFFS BY MOUTH EVERY 6 HOURS AS NEEDED FOR WHEEZE, Disp: 8 5 Inhaler, Rfl: 1    aspirin 81 mg chewable tablet, Chew 1 tablet (81 mg total) daily, Disp: 30 tablet, Rfl: 0    atorvastatin (LIPITOR) 40 mg tablet, TAKE 1 TABLET (40 MG TOTAL) BY MOUTH DAILY WITH DINNER, Disp: 90 tablet, Rfl: 1    folic acid (FOLVITE) 1 mg tablet, TAKE 1 TABLET BY MOUTH EVERY DAY, Disp: 30 tablet, Rfl: 0    gabapentin (NEURONTIN) 300 mg capsule, Take 1 capsule (300 mg total) by mouth 3 (three) times a day, Disp: 90 capsule, Rfl: 3    sulfaSALAzine (AZULFIDINE) 500 mg tablet, Every 6 hours, Disp: , Rfl:     thiamine (VITAMIN B1) 100 mg tablet, Take 1 tablet (100 mg total) by mouth daily, Disp: 90 tablet, Rfl: 3    albuterol (2 5 mg/3 mL) 0 083 % nebulizer solution, Take 1 vial (2 5 mg total) by nebulization every 4 (four) hours as needed for wheezing as needed for wheezing (Patient not taking: Reported on 2/11/2020), Disp: 150 mL, Rfl: 0    Albuterol Sulfate (PROAIR RESPICLICK) 623 (90 Base) MCG/ACT AEPB, Inhale 1 puff every 4 (four) hours as needed (SOB) (Patient not taking: Reported on 2/11/2020), Disp: 1 each, Rfl: 3    amiodarone 200 mg tablet, amiodarone 200 mg tablet, Disp: , Rfl:     lisinopril (ZESTRIL) 2 5 mg tablet, lisinopril 2 5 mg tablet, Disp: , Rfl:     Nebulizers (AERONEB GO NEBULIZER HANDSET) MISC, by Does not apply route 2 (two) times a day as needed (wheezing) (Patient not taking: Reported on 2/11/2020), Disp: 1 each, Rfl: 0  Allergies   Allergen Reactions    Medical Tape     Other      Brown cloth band aids       Latex Rash       Vitals: Blood pressure 110/70, pulse 95, temperature 97 7 °F (36 5 °C), temperature source Tympanic, height 5' 6" (1 676 m), weight 66 2 kg (146 lb), SpO2 92 %  , Body mass index is 23 57 kg/m²   Oxygen Therapy  SpO2: 92 %  Oxygen Therapy: None (Room air)    Physical Exam  GEN: WDWN, appears slightly unkempt, has mild conversational dyspnea; smells of smoke  HEENT: NCAT, EOMI  CVS: Regular  LUNGS:  Diminished diffusely bilaterally  ABD: soft  EXT: No c/c/e  NEURO: No focal deficits  MS: Moving all extremities  SKIN: warm, dry  PSYCH: calm, cooperative    Imaging and other studies: I have personally reviewed pertinent films in PACS  Two-view chest x-ray reviewed by me personally shows no acute disease  Pulmonary function testing: Office spirometry shows very severe obstruction    DIANELYS Golden Rater [facility]

## 2020-02-12 ENCOUNTER — PATIENT OUTREACH (OUTPATIENT)
Dept: INTERNAL MEDICINE CLINIC | Facility: CLINIC | Age: 60
End: 2020-02-12

## 2020-02-12 NOTE — PROGRESS NOTES
Outpatient Care Management Note:    Received call from patient wanting to update me regarding his appointment yesterday with the pulmonary doctor  Patient reports he did call his insurance and the 3 visits a year are for out of network providers but they report the doctor's he is seeing are within network and he should be able to get necessary testing done as well  I did encourage he call pulmonary office and schedule his 1 month follow up  Patient reports he did call [facility] regarding prescriptions and was told since [facility]  Patient will continue to utilize [employer] for discount on medication  Patient was given samples of Trelegy and was started on Ipratropium bromide for nebulizer in addition to albuterol  Patient is aware he will be contacting the pulmonary office for refills of his respiratory medication, but encouraged to call PCP office as needed or with further questions or concerns  I did let patient know that I am glad he got connected with a pulmonary doctor and encouraged he follow up with them routinely  Patient reports understanding of information that was reviewed at his appointment yesterday  Please see pulmonary note for more information

## 2020-02-12 NOTE — ASSESSMENT & PLAN NOTE
- we discussed tobacco cessation for 8 minutes  He is interested but not committed or motivated to quit at this time  We did discuss the deleterious effects of tobacco use on his body  We also discussed that he is not eligible for lung transplantation as long as he continues to abuse tobacco   He recognizes that he uses tobacco to relieve stress  He states he has a lot of stress in his home life  - he qualifies for screening CT scan given his tobacco history; however, his chest x-ray cost him $100 out of pocket  We will readdress the CT scan at his next visit as [address] [place] [address] does have a program for assistance to receive a CT scan for $49

## 2020-02-12 NOTE — ASSESSMENT & PLAN NOTE
- Disease is not optimally controlled due to poor access to inhalers due to cost and no pharmacy benefit to insurance coverage  - Start ICS/LABA/LAMA (Trelegy)  Provided patient with samples that should last him for 2 months  Also provided him with paperwork to see if he qualifies for payment assistance through the [employer]   - he will need to continue to use Albuterol nebulizer as needed  He can also add an Ipratropium nebulizer  I provided him with prescriptions for both of these medications and adjusted the frequency of use and amount dispensed so he should a more adequate supply of medication vitals each month  - Additionally, these medications are on the $4 medication list from [employer]  I provided him with [employer]'s phone number  He will call to see if he qualifies to receive the medications at that price    - Finally, I provided him with 2 coupons that can be used for patient's that have commercial insurance to receive a discounted inhaler   - his lung disease is very advanced  It is in fact end-stage  He has a high mortality over the next 4 years  We reviewed this together  We briefly discussed lung transplantation  He is not currently a candidate since he is using tobacco    - he should maintain a high activity level as able  Pulmonary rehab will be cost prohibitive for him; therefore, I would like him to remain active

## 2020-02-27 DIAGNOSIS — J45.909 ASTHMA, UNSPECIFIED ASTHMA SEVERITY, UNSPECIFIED WHETHER COMPLICATED, UNSPECIFIED WHETHER PERSISTENT: ICD-10-CM

## 2020-02-27 RX ORDER — ALBUTEROL SULFATE 90 UG/1
AEROSOL, METERED RESPIRATORY (INHALATION)
Qty: 8.5 INHALER | Refills: 1 | Status: SHIPPED | OUTPATIENT
Start: 2020-02-27 | End: 2020-07-23 | Stop reason: SDUPTHER

## 2020-03-04 ENCOUNTER — OFFICE VISIT (OUTPATIENT)
Dept: INTERNAL MEDICINE CLINIC | Facility: CLINIC | Age: 60
End: 2020-03-04

## 2020-03-04 VITALS
SYSTOLIC BLOOD PRESSURE: 102 MMHG | WEIGHT: 150.35 LBS | BODY MASS INDEX: 24.16 KG/M2 | TEMPERATURE: 97.8 F | HEART RATE: 92 BPM | OXYGEN SATURATION: 89 % | DIASTOLIC BLOOD PRESSURE: 66 MMHG | HEIGHT: 66 IN

## 2020-03-04 DIAGNOSIS — K50.918 CROHN'S DISEASE WITH OTHER COMPLICATION, UNSPECIFIED GASTROINTESTINAL TRACT LOCATION (HCC): ICD-10-CM

## 2020-03-04 DIAGNOSIS — Z13.1 SCREENING FOR DIABETES MELLITUS: ICD-10-CM

## 2020-03-04 DIAGNOSIS — Z23 NEED FOR INFLUENZA VACCINATION: ICD-10-CM

## 2020-03-04 DIAGNOSIS — J44.9 STAGE 4 VERY SEVERE COPD BY GOLD CLASSIFICATION (HCC): ICD-10-CM

## 2020-03-04 DIAGNOSIS — Z23 NEED FOR PNEUMOCOCCAL VACCINATION: ICD-10-CM

## 2020-03-04 DIAGNOSIS — I25.10 CORONARY ARTERY DISEASE INVOLVING NATIVE CORONARY ARTERY OF NATIVE HEART WITHOUT ANGINA PECTORIS: Primary | ICD-10-CM

## 2020-03-04 PROCEDURE — 90472 IMMUNIZATION ADMIN EACH ADD: CPT | Performed by: INTERNAL MEDICINE

## 2020-03-04 PROCEDURE — 90471 IMMUNIZATION ADMIN: CPT | Performed by: INTERNAL MEDICINE

## 2020-03-04 PROCEDURE — 3008F BODY MASS INDEX DOCD: CPT | Performed by: INTERNAL MEDICINE

## 2020-03-04 PROCEDURE — 3074F SYST BP LT 130 MM HG: CPT | Performed by: INTERNAL MEDICINE

## 2020-03-04 PROCEDURE — 3078F DIAST BP <80 MM HG: CPT | Performed by: INTERNAL MEDICINE

## 2020-03-04 PROCEDURE — 99214 OFFICE O/P EST MOD 30 MIN: CPT | Performed by: INTERNAL MEDICINE

## 2020-03-04 PROCEDURE — 4004F PT TOBACCO SCREEN RCVD TLK: CPT | Performed by: INTERNAL MEDICINE

## 2020-03-04 PROCEDURE — 90732 PPSV23 VACC 2 YRS+ SUBQ/IM: CPT | Performed by: INTERNAL MEDICINE

## 2020-03-04 PROCEDURE — 90682 RIV4 VACC RECOMBINANT DNA IM: CPT | Performed by: INTERNAL MEDICINE

## 2020-03-04 NOTE — PROGRESS NOTES
[facility] [address]  INTERNAL MEDICINE  [address] [facility]    NAME: Wesley Patricia  AGE: 64 y o  SEX: male    DATE OF ENCOUNTER: 3/4/2020    Assessment and Plan     1  Need for pneumococcal vaccination  - PNEUMOCOCCAL POLYSACCHARIDE VACCINE 23-VALENT =>1YO SQ IM    2  Need for influenza vaccination  - influenza vaccine, [id number], quadrivalent, recombinant, PF, 0 5 mL, for patients 18 yr+ ([employer])    3  Screening for diabetes mellitus  - HEMOGLOBIN A1C W/ EAG ESTIMATION; Future  - Lipid Panel with Direct LDL reflex; Future    4  Crohn's disease with other complication, unspecified gastrointestinal tract location [facility])  -Patient is on sulfasalazine 500mg Q6H  -Has a h/p perforation with a ileostomy in 2018 that was subsequently reversed  -Denies any abdominal pain, change in bowel habits  -Patient has an upcoming appointment with colorectal surgery on 03/23    5  Stage 4 very severe COPD by GOLD classification ([facility] [address] )  · Established with Pulmonology, on trelegy and prn albuterol and ipratropium     6  Coronary artery disease involving native coronary artery of native heart without angina pectoris  · Continue aspirin and statin  · Patient encouraged to follow up with cardiologist   - CBC and differential; Future  - Comprehensive metabolic panel; Future  - TSH, 3rd generation with Free T4 reflex;  Future    Orders Placed This Encounter   Procedures    PNEUMOCOCCAL POLYSACCHARIDE VACCINE 23-VALENT =>1YO SQ IM    influenza vaccine, [id number], quadrivalent, recombinant, PF, 0 5 mL, for patients 18 yr+ ([employer])    CBC and differential    Comprehensive metabolic panel    HEMOGLOBIN A1C W/ EAG ESTIMATION    Lipid Panel with Direct LDL reflex    TSH, 3rd generation with Free T4 reflex       - Counseling Documentation: patient was counseled regarding: diagnostic results, instructions for management, risk factor reductions, prognosis, patient and family education, impressions, risks and benefits of treatment options and importance of compliance with treatment  - Counseling Time: counseling time more than 50% of visit: 20 minutes  - Barriers to treatment: None  - Medication Side Effects: Adverse side effects of medications were reviewed with the patient/guardian today  - Self Referrals: No    Chief Complaint     Chief Complaint   Patient presents with    Follow-up       History of Present Illness     Patient is here for routine follow-up and does not have any specific symptoms  He continues to smoke 3 cigars a day  He complains of shortness of breath when walking long distances  He otherwise feels well  The following portions of the patient's history were reviewed and updated as appropriate: allergies, current medications, past family history, past medical history, past social history, past surgical history and problem list     Review of Systems     Review of Systems   Respiratory: Positive for shortness of breath  All other systems reviewed and are negative        Active Problem List     Patient Active Problem List   Diagnosis    Crohn disease ([facility] )    Spinal stenosis of cervical region    Pericarditis    Coronary artery disease involving native coronary artery of native heart without angina pectoris    Atherosclerosis of coronary artery    Stage 4 very severe COPD by GOLD classification ([facility] )    Dyslipidemia    Cigarette nicotine dependence without complication    Agitation    Heart failure, systolic, due to idiopathic cardiomyopathy ([address] )    Ileostomy in place ([address] )    Moderate protein-calorie malnutrition ([address] )    Anemia    Hypocalcemia    Perforation of colon ([address] )    Hypotension    Intra-abdominal abscess (HCC)    DEBBY (acute kidney injury) ([address] )    Abdominal pain    Ambulatory dysfunction    Presence of drug-eluting stent in right coronary artery    Paroxysmal atrial fibrillation (HCC)    Hypercalcemia    Hematemesis with nausea    Status post reversal of ileostomy    Screening for diabetes mellitus       Objective     /66 (BP Location: Right arm, Patient Position: Sitting, Cuff Size: Adult)   Pulse 92   Temp 97 8 °F (36 6 °C) (Oral)   Ht 5' 6" (1 676 m)   Wt 68 2 kg (150 lb 5 7 oz)   SpO2 (!) 89%   BMI 24 27 kg/m²     Physical Exam:   GENERAL: NAD  HEENT:  NC/AT, MMM, no scleral icterus  CARDIAC:  RRR, +S1/S2, no S3/S4 heard, no m/g/r  PULMONARY:  Mild expiratory wheezing bilaterally   ABDOMEN:  Soft, NT/ND, +BS, no rebound/guarding/rigidity  Extremities:  2+ Pulses in DP/PT  No edema, cyanosis, or clubbing  NEUROLOGIC:  Alert/oriented x3     SKIN:  No rashes or erythema      Current Medications     Current Outpatient Medications:     albuterol (2 5 mg/3 mL) 0 083 % nebulizer solution, Take 1 vial (2 5 mg total) by nebulization every 4 (four) hours, Disp: 180 vial, Rfl: 3    albuterol (PROVENTIL HFA,VENTOLIN HFA) 90 mcg/act inhaler, INHALE 2 PUFFS BY MOUTH EVERY 6 HOURS AS NEEDED FOR WHEEZE, Disp: 8 5 Inhaler, Rfl: 1    aspirin 81 mg chewable tablet, Chew 1 tablet (81 mg total) daily, Disp: 30 tablet, Rfl: 0    atorvastatin (LIPITOR) 40 mg tablet, TAKE 1 TABLET (40 MG TOTAL) BY MOUTH DAILY WITH DINNER, Disp: 90 tablet, Rfl: 1    folic acid (FOLVITE) 1 mg tablet, TAKE 1 TABLET BY MOUTH EVERY DAY, Disp: 30 tablet, Rfl: 0    gabapentin (NEURONTIN) 300 mg capsule, Take 1 capsule (300 mg total) by mouth 3 (three) times a day, Disp: 90 capsule, Rfl: 3    ipratropium (ATROVENT) 0 02 % nebulizer solution, Take 1 vial (0 5 mg total) by nebulization 4 (four) times a day, Disp: 120 vial, Rfl: 3    sulfaSALAzine (AZULFIDINE) 500 mg tablet, Every 6 hours, Disp: , Rfl:     Albuterol Sulfate (PROAIR RESPICLICK) 808 (90 Base) MCG/ACT AEPB, Inhale 1 puff every 4 (four) hours as needed (SOB) (Patient not taking: Reported on 2/11/2020), Disp: 1 each, Rfl: 3    Nebulizers (AERONEB GO NEBULIZER HANDSET) MISC, by Does not apply route 2 (two) times a day as needed (wheezing) (Patient not taking: Reported on 2/11/2020), Disp: 1 each, Rfl: 0    Health Maintenance     Health Maintenance   Topic Date Due    Annual Physical  05/31/1980    Pneumococcal Vaccine: Pediatrics (0 to 5 Years) and At-Risk Patients (6 to 59 Years) (1 of 1 - PPSV23) 02/02/2017    Influenza Vaccine  07/01/2019    Depression Screening PHQ  08/09/2020    BMI: Adult  02/11/2021    Pneumococcal Vaccine: 65+ Years (2 of 2 - PPSV23) 05/31/2027    DTaP,Tdap,and Td Vaccines (2 - Td) 07/10/2027    CRC Screening: Colonoscopy  10/04/2028    HIV Screening  Completed    Hepatitis C Screening  Completed    HIB Vaccine  Aged Out    Hepatitis B Vaccine  Aged Out    IPV Vaccine  Aged Out    Hepatitis A Vaccine  Aged Out    Meningococcal ACWY Vaccine  Aged Out    HPV Vaccine  Aged Out     Immunization History   Administered Date(s) Administered    INFLUENZA 10/21/2016, 10/07/2018    Influenza Quadrivalent, 6-35 Months IM 10/21/2016    Influenza TIV (IM) 09/29/2015    Influenza, recombinant, quadrivalent,injectable, preservative free 10/07/2018, 03/04/2020    Pneumococcal Conjugate 13-Valent 12/08/2016    Pneumococcal Polysaccharide PPV23 03/04/2020    Tdap 07/10/2017           Edgardo Stockton  3/4/2020 9:35 AM

## 2020-04-01 ENCOUNTER — TELEPHONE (OUTPATIENT)
Dept: INTERNAL MEDICINE CLINIC | Facility: CLINIC | Age: 60
End: 2020-04-01

## 2020-04-28 DIAGNOSIS — M54.12 CERVICAL RADICULOPATHY: ICD-10-CM

## 2020-04-30 RX ORDER — GABAPENTIN 300 MG/1
CAPSULE ORAL
Qty: 90 CAPSULE | Refills: 3 | Status: SHIPPED | OUTPATIENT
Start: 2020-04-30 | End: 2020-07-23 | Stop reason: SDUPTHER

## 2020-05-20 ENCOUNTER — TELEPHONE (OUTPATIENT)
Dept: INTERNAL MEDICINE CLINIC | Facility: CLINIC | Age: 60
End: 2020-05-20

## 2020-07-11 DIAGNOSIS — I25.10 CORONARY ARTERY DISEASE INVOLVING NATIVE CORONARY ARTERY OF NATIVE HEART WITHOUT ANGINA PECTORIS: ICD-10-CM

## 2020-07-11 RX ORDER — ATORVASTATIN CALCIUM 40 MG/1
40 TABLET, FILM COATED ORAL
Qty: 30 TABLET | Refills: 5 | OUTPATIENT
Start: 2020-07-11

## 2020-07-13 DIAGNOSIS — I25.10 CORONARY ARTERY DISEASE INVOLVING NATIVE CORONARY ARTERY OF NATIVE HEART WITHOUT ANGINA PECTORIS: ICD-10-CM

## 2020-07-13 RX ORDER — ATORVASTATIN CALCIUM 40 MG/1
40 TABLET, FILM COATED ORAL
Qty: 90 TABLET | Refills: 3 | Status: SHIPPED | OUTPATIENT
Start: 2020-07-13 | End: 2020-07-23 | Stop reason: SDUPTHER

## 2020-07-13 RX ORDER — ATORVASTATIN CALCIUM 40 MG/1
40 TABLET, FILM COATED ORAL
Qty: 90 TABLET | Refills: 1 | Status: CANCELLED | OUTPATIENT
Start: 2020-07-13

## 2020-07-13 NOTE — PROGRESS NOTES
Patient called and left me a message on Saturday 7/11/2020 that he is in need of a refill on his atorvastatin  [facility] [place]

## 2020-07-23 ENCOUNTER — PATIENT OUTREACH (OUTPATIENT)
Dept: INTERNAL MEDICINE CLINIC | Facility: CLINIC | Age: 60
End: 2020-07-23

## 2020-07-23 DIAGNOSIS — J44.9 COPD, SEVERE (HCC): ICD-10-CM

## 2020-07-23 DIAGNOSIS — J45.909 ASTHMA, UNSPECIFIED ASTHMA SEVERITY, UNSPECIFIED WHETHER COMPLICATED, UNSPECIFIED WHETHER PERSISTENT: ICD-10-CM

## 2020-07-23 DIAGNOSIS — M54.12 CERVICAL RADICULOPATHY: ICD-10-CM

## 2020-07-23 DIAGNOSIS — K50.918 CROHN'S DISEASE WITH OTHER COMPLICATION, UNSPECIFIED GASTROINTESTINAL TRACT LOCATION (HCC): Primary | ICD-10-CM

## 2020-07-23 DIAGNOSIS — I25.10 CORONARY ARTERY DISEASE INVOLVING NATIVE CORONARY ARTERY OF NATIVE HEART WITHOUT ANGINA PECTORIS: ICD-10-CM

## 2020-07-23 RX ORDER — ASPIRIN 81 MG/1
81 TABLET, CHEWABLE ORAL DAILY
Qty: 90 TABLET | Refills: 0 | Status: SHIPPED | OUTPATIENT
Start: 2020-07-23

## 2020-07-23 RX ORDER — GABAPENTIN 300 MG/1
300 CAPSULE ORAL 3 TIMES DAILY
Qty: 90 CAPSULE | Refills: 3 | Status: SHIPPED | OUTPATIENT
Start: 2020-07-23 | End: 2021-07-07 | Stop reason: SDUPTHER

## 2020-07-23 RX ORDER — SULFASALAZINE 500 MG/1
500 TABLET ORAL 4 TIMES DAILY
Qty: 360 TABLET | Refills: 0 | Status: SHIPPED | OUTPATIENT
Start: 2020-07-23 | End: 2021-10-19 | Stop reason: SDUPTHER

## 2020-07-23 RX ORDER — ATORVASTATIN CALCIUM 40 MG/1
40 TABLET, FILM COATED ORAL
Qty: 90 TABLET | Refills: 0 | Status: SHIPPED | OUTPATIENT
Start: 2020-07-23 | End: 2021-01-19

## 2020-07-23 RX ORDER — ALBUTEROL SULFATE 90 UG/1
1 AEROSOL, METERED RESPIRATORY (INHALATION) EVERY 6 HOURS PRN
Qty: 8.5 INHALER | Refills: 1 | Status: SHIPPED | OUTPATIENT
Start: 2020-07-23 | End: 2021-04-13

## 2020-07-23 NOTE — PROGRESS NOTES
Outpatient Care Management Note:    Received call from patient that his insurance recently changed from [address] [facility] to [address] [facility] [address] [facility]  I did check with clerical staff and this is not an insurance our office participates with at this time  Patient was scheduled with Dr Gabe Lanier (colon-rectal) for Monday 7/27 but appointment to be canceled due to insurance  Patient instructed to call new insurance and have them send him a list of PCP, Pulmonologist's, and GI doctor's for his chron's diease so he can establish care with other providers that accept new insurance  Patient reports understanding  Patient denies and symptoms or complaints at this time  He is requesting if all his medication could be sent to pharmacy and is requesting a 3 month supply of medication to give him time to find other doctor's  [facility] [address] [facility] [place]  Patient confirms taking   Atorvastatin  Gabapentin  Albuterol inhaler  Albuterol for nebulizer  Ipratropium for nebulizer  Folic acid  Aspirin  Sufasalazine 500 mg tablet (takes 2 tablets every 4 hours) Patient reports Dr Gabe Lanier has prescribed this to him and will be running out  Patient will be looking into other insurance plans and ones with drug plans as he pays out of pocket for his medication and utilizes [employer]  Patient does not have any further questions, concerns, or other needs at this time

## 2020-07-27 ENCOUNTER — PATIENT OUTREACH (OUTPATIENT)
Dept: INTERNAL MEDICINE CLINIC | Facility: CLINIC | Age: 60
End: 2020-07-27

## 2020-07-27 DIAGNOSIS — J44.9 COPD, SEVERE (HCC): ICD-10-CM

## 2020-07-27 RX ORDER — ALBUTEROL SULFATE 2.5 MG/3ML
2.5 SOLUTION RESPIRATORY (INHALATION) EVERY 4 HOURS
Qty: 180 VIAL | Refills: 0 | Status: SHIPPED | OUTPATIENT
Start: 2020-07-27 | End: 2020-08-10 | Stop reason: SDUPTHER

## 2020-07-27 NOTE — PROGRESS NOTES
Outpatient Care Management Note:    I called patient and made him aware medication was sent to pharmacy and a 90 day supply was sent  Patient made aware message out for PCP to send the albuterol solution to pharmacy still  Patient reports he did get a list of PCP's in the area he can go to with his insurance and will be planning on making an appointment with one in August  Patient does not have any further questions, concerns, or other needs at this time  He did ask if colon rectal appointment was cancelled for today and I made him aware it was

## 2020-08-04 NOTE — TELEPHONE ENCOUNTER
CLAUDIA RN FROM [address] [place] [address] St OUT TO SEE PT  YESTERDAY 9/11/18 AND SAW THAT A PT  HAD AN ORDER TO HAVE A BMP DRAWN AND IT WAS TO BE DONE PRIOR TO HIS NEXT APPT    PT   SAID HE DIDN'T NEED IT ANYMORE THAT YOU TOLD HIM NOT TO HAVE IT DONE  DO YOU WANT IT DRAWN?? Drysol Counseling:  I discussed with the patient the risks of drysol/aluminum chloride including but not limited to skin rash, itching, irritation, burning.

## 2020-08-10 DIAGNOSIS — J44.9 COPD, SEVERE (HCC): ICD-10-CM

## 2020-08-10 RX ORDER — ALBUTEROL SULFATE 2.5 MG/3ML
SOLUTION RESPIRATORY (INHALATION)
Qty: 75 ML | Refills: 7 | Status: SHIPPED | OUTPATIENT
Start: 2020-08-10 | End: 2020-12-22 | Stop reason: SDUPTHER

## 2020-08-10 RX ORDER — ALBUTEROL SULFATE 2.5 MG/3ML
2.5 SOLUTION RESPIRATORY (INHALATION) EVERY 4 HOURS
Qty: 180 VIAL | Refills: 0 | Status: SHIPPED | OUTPATIENT
Start: 2020-08-10 | End: 2020-10-18

## 2020-08-10 RX ORDER — ALBUTEROL SULFATE 2.5 MG/3ML
2.5 SOLUTION RESPIRATORY (INHALATION) EVERY 4 HOURS
Qty: 180 VIAL | Refills: 0 | Status: SHIPPED | OUTPATIENT
Start: 2020-08-10 | End: 2020-08-10 | Stop reason: SDUPTHER

## 2020-08-10 NOTE — TELEPHONE ENCOUNTER
Name of medication, dose, quantity and frequency:        Requested Prescriptions     Pending Prescriptions Disp Refills    albuterol (2 5 mg/3 mL) 0 083 % nebulizer solution 180 vial 0     Sig: Take 1 vial (2 5 mg total) by nebulization every 4 (four) hours       Number of refills left: 0    Amount of medication left:    Pharmacy verified and updated: yes    Additional information:      Patient called in requesting refills  He is completely out  Please send electronically      Please send in as doc of the AM         (script from 07/27/2020 was not submitted electronically)

## 2020-10-17 DIAGNOSIS — J44.9 COPD, SEVERE (HCC): ICD-10-CM

## 2020-10-18 RX ORDER — ALBUTEROL SULFATE 2.5 MG/3ML
2.5 SOLUTION RESPIRATORY (INHALATION) EVERY 4 HOURS
Qty: 75 ML | Refills: 6 | Status: SHIPPED | OUTPATIENT
Start: 2020-10-18 | End: 2020-12-21 | Stop reason: SDUPTHER

## 2020-11-15 NOTE — H&P
INTERNAL MEDICINE HISTORY AND PHYSICAL  ED 14 SOD Team A    NAME: Yesenia Brownlee  AGE: 66 y o  SEX: male  : 1960   MRN: [id number]  ENCOUNTER: [id number]    DATE: 2018  TIME: 4:38 AM    Primary Care Physician: Concha Ward DO  Admitting Provider: Adele Higgins MD    Chief complaint:  Abdominal pain    History of Present Illness     Yesenia Brownlee is a 66 y o  male with past medical history significant for COPD, Crohn's disease (diagnosed in May 2018), IBD, diverticulitis, hypertension, and hyperlipidemia  The patient presented to the ED after having abdominal pain that started Monday morning and progressively worsened throughout the day  He states nothing help with this pain and that it is along his lower abdomen, rating the pain a 7-8/10  He states he had associated nausea but no vomiting with this pain, and that his ostomy had normal output  Patient denies any recent travel or sick contacts  Patient denies drug use in states his last alcoholic drink was 5 months ago  Patient smokes cigarettes 1 pack per day since he was a teenager  IR drain placed  for complicated intra-abdominal abscess status post ileostomy in 2018  At this previous hospital stay the patient also had alcohol withdrawal symptoms and was treated  He was also in the ICU and placed on Levophed drip and was given a stress dose of steroids to help with his hypertension  Review of Systems   Review of Systems   Constitutional: Positive for appetite change  Negative for chills, fatigue, fever and unexpected weight change  HENT: Negative for congestion, ear pain, sore throat and tinnitus  Eyes: Negative for visual disturbance  Respiratory: Negative for cough, chest tightness, shortness of breath and wheezing  Cardiovascular: Negative for chest pain, palpitations and leg swelling  Gastrointestinal: Positive for abdominal pain and nausea   Negative for abdominal distention, constipation, diarrhea and vomiting  Genitourinary: Negative for dysuria and frequency  Skin: Negative for rash  Neurological: Negative for dizziness, weakness, light-headedness, numbness and headaches  Past Medical History     Past Medical History:   Diagnosis Date    Anxiety     Asthma     Cardiac disease     Chest pain     Colitis     Colon polyps     COPD (chronic obstructive pulmonary disease) (HCC)     Coronary artery disease     Diverticulitis     Esophageal reflux     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HCC)     Hyperlipidemia     Hypertension     IBD (inflammatory bowel disease)     [facility] )     Old myocardial infarction     Perforation of colon (HCC)     Type 2 diabetes, diet controlled ([place] [address] )     Ulcerative colitis ([facility] )        Past Surgical History     Past Surgical History:   Procedure Laterality Date    ANGIOPLASTY      COLON SURGERY      COLONOSCOPY N/A 10/24/2016    Procedure: COLONOSCOPY;  Surgeon: Nadine Norris MD;  [facility]: [facility]; Service:     CORONARY ANGIOPLASTY WITH STENT PLACEMENT      ESOPHAGOGASTRODUODENOSCOPY N/A 10/24/2016    Procedure: ESOPHAGOGASTRODUODENOSCOPY (EGD); Surgeon: Nadine Norris MD;  [facility]; Service:     EXPLORATORY LAPAROTOMY W/ BOWEL RESECTION N/A 5/22/2018    Procedure: EXPLORATORY LAPAROTOMY, ILIOCOLECTOMY, ILIOCOLONIC ANASTAMOSIS, LOOP ILIOSTOMY, REPAIR OF SEROSAL TEAR, EXTENSIVE LYSIS OF ADHESIONS, WOUND VAC PLACEMENT;  Surgeon: Nadine Norris MD;  [facility];  Service: Colorectal    HEMICOLECTOMY      OTHER SURGICAL HISTORY      stent indications acute myocardial infarction    AK COLONOSCOPY FLX DX W/COLLJ SPEC WHEN PFRMD N/A 2/6/2018    Procedure: COLONOSCOPY;  Surgeon: Nadine Norris MD;  [facility];   Service: Colorectal    TONSILLECTOMY         Social History     History   Alcohol Use No     Comment: pt "quit about 4 months ago"     History   Drug Use No     History   Smoking Status    Current Every Day Smoker    Packs/day: 0 00    Years: 5 00    Types: Cigars   Smokeless Tobacco    Never Used     Comment: 3 cigars per day       Family History     Family History   Problem Relation Age of Onset    Coronary artery disease Father     Crohn's disease Father     Stroke Father     Diabetes Family        Medications Prior to Admission     Prior to Admission medications    Medication Sig Start Date End Date Taking?  Authorizing Provider   albuterol (2 5 mg/3 mL) 0 083 % nebulizer solution Take 3 mL (2 5 mg total) by nebulization every 4 (four) hours as needed for wheezing as needed for wheezing 6/1/18  Yes Francheska Potter MD   amiodarone 200 mg tablet Take 1 tablet (200 mg total) by mouth daily 7/31/18  Yes Donaldo Ying,    aspirin 81 mg chewable tablet Chew 1 tablet (81 mg total) daily 6/1/18  Yes Francheska Potter MD   atorvastatin (LIPITOR) 40 mg tablet Take 1 tablet (40 mg total) by mouth daily with dinner for 30 days 7/17/18 8/16/18 Yes Korin Woody,    folic acid (FOLVITE) 1 mg tablet Take 1 tablet (1 mg total) by mouth daily 7/18/18  Yes Amy Hampton MD   gabapentin (NEURONTIN) 400 mg capsule Take 1 capsule (400 mg total) by mouth 3 (three) times a day 6/1/18  Yes Francheska Potter MD   lisinopril (ZESTRIL) 2 5 mg tablet Take 1 tablet (2 5 mg total) by mouth daily 6/1/18  Yes Luciano Echevarria MD   mesalamine (PENTASA) 500 mg CR capsule Pentasa 500 mg capsule,controlled release   Yes Historical Provider, MD   metoprolol succinate (TOPROL-XL) 50 mg 24 hr tablet Take 1 tablet (50 mg total) by mouth daily 6/1/18  Yes Francheska Potter MD   Multiple Vitamins-Minerals (CENTRUM SILVER 50+MEN PO) Centrum Silver Men   Yes Historical Provider, MD   Nebulizers (AERONEB GO NEBULIZER HANDSET) MISC by Does not apply route 2 (two) times a day as needed (wheezing) 6/8/18  Yes Francheska Potter MD   nicotine (NICODERM CQ) 7 mg/24hr TD 24 hr patch Place 1 patch on the skin every 24 hours 6/8/18  Yes Francheska Potter MD nystatin (MYCOSTATIN) ointment Apply topically 2 (two) times a day 7/17/18  Yes Micaela Rivero MD   thiamine 100 MG tablet Take 1 tablet (100 mg total) by mouth daily 7/31/18  Yes Donaldo Schwartz DO   Tiotropium Bromide Monohydrate (SPIRIVA RESPIMAT) 2 5 MCG/ACT AERS Inhale 1 Act (2 5 mcg total) daily 6/1/18  Yes Luciano Echevarria MD   traMADol (ULTRAM) 50 mg tablet Take 100 mg by mouth every 12 (twelve) hours 6/6/18  Yes Paulette Brar MD   VENTOLIN  (90 Base) MCG/ACT inhaler Inhale 2 puffs every 4 (four) hours as needed for wheezing or shortness of breath 6/1/18  Yes Luciano Echevarria MD   vitamin A 10,000 units capsule Take 3 capsules (30,000 Units total) by mouth daily 6/1/18  Yes Luciano Echevarria MD       Allergies     Allergies   Allergen Reactions    Medical Tape     Other      Brown cloth band aids       Latex Rash       Objective     Vitals:    08/07/18 0330 08/07/18 0345 08/07/18 0415 08/07/18 0424   BP: (!) 87/54 (!) 88/51 (!) 78/52 (!) 80/51   BP Location: Left arm Left arm Left arm Left arm   Pulse: 86 86 84 85   Resp: 18 20 20 20   Temp:       TempSrc:       SpO2: 94% 93% 94% 95%   Weight:         Body mass index is 21 79 kg/m²  Intake/Output Summary (Last 24 hours) at 08/07/18 0438  Last data filed at 08/07/18 0415   Gross per 24 hour   Intake             4250 ml   Output              300 ml   Net             3950 ml     Invasive Devices     Peripheral Intravenous Line            Peripheral IV 08/07/18 Right Antecubital less than 1 day    Peripheral IV 08/07/18 Right Forearm less than 1 day          Drain            Colostomy  RUQ 76 days                Physical Exam  GENERAL: Appears well-developed and well-nourished  Appears in no acute distress   HEENT: Normocephalic and atraumatic  No scleral icterus  PERRLA  EOMI B/L  No oropharyngeal edema  MM moist    NECK: Neck supple with no lymphadenopathy  Trachea midline  No JVD  CARDIOVASCULAR: S1 and S2 are present  Regular rate and rhythm  No murmurs, rubs, or gallops  RESPIRATORY: Expiratory wheezes CTA B/L, no rales,or rhonchi  Normal respiratory expansion  ABDOMINAL: Ostomy present on R side of abdomen  Dressing over midline abdomen  Diffuse tenderness to palpation, especially on L side  Bowel sounds present in all 4 quadrants, soft, non-distended  No organomegaly, rebound, or guarding  EXTREMITIES: 2+ DP and PT pulses bilaterally; no cyanosis, clubbing, edema  ROM intact  CID x4   MUSCULOSKELETAL: No joint tenderness, deformity or swelling, full range of motion without pain  NEUROLOGIC: Patient is alert and oriented to person, place, and time  No sensory or motor deficits  CN 2-12 intact  Plantars downgoing bilaterally  Speech fluent  SKIN: Skin is warm and dry  No skin lesions are present  No rashes  PSYCHIATRIC: Normal mood and affect     Lab Results: I have personally reviewed pertinent reports      CBC:   Results from last 7 days  Lab Units 08/07/18  0042   WBC Thousand/uL 18 98*   RBC Million/uL 3 55*   HEMOGLOBIN g/dL 9 8*   HEMATOCRIT % 30 7*   MCV fL 87   MCH pg 27 6   MCHC g/dL 31 9   RDW % 15 8*   MPV fL 10 4   PLATELETS Thousands/uL 442*   NRBC AUTO /100 WBCs 0   NEUTROS PCT % 78*   LYMPHS PCT % 13*   MONOS PCT % 8   EOS PCT % 0   BASOS PCT % 0   NEUTROS ABS Thousands/µL 14 81*   LYMPHS ABS Thousands/µL 2 40   MONOS ABS Thousand/µL 1 52*   EOS ABS Thousand/µL 0 07   , Chemistry Profile:   Results from last 7 days  Lab Units 08/07/18  0042   SODIUM mmol/L 132*   POTASSIUM mmol/L 4 1   CHLORIDE mmol/L 99*   CO2 mmol/L 21   ANION GAP mmol/L 12   BUN mg/dL 47*   CREATININE mg/dL 3 43*   GLUCOSE RANDOM mg/dL 118   CALCIUM mg/dL 9 6   AST U/L 13   ALT U/L 22   ALK PHOS U/L 132*   TOTAL PROTEIN g/dL 8 6*   BILIRUBIN TOTAL mg/dL 0 19*   EGFR ml/min/1 73sq m 19       Imaging: I have personally reviewed pertinent films in PACS  Ct Abdomen Pelvis Wo Contrast    Result Date: 8/7/2018  Narrative: CT ABDOMEN AND PELVIS WITHOUT IV CONTRAST INDICATION:   abdominal pain  COMPARISON:  July 15, 2018 TECHNIQUE:  CT examination of the abdomen and pelvis was performed without intravenous contrast   Axial, sagittal, and coronal 2D reformatted images were created from the source data and submitted for interpretation  Radiation dose length product (DLP) for this visit:  301 21 mGy-cm   This examination, like all CT scans performed in the [facility], was performed utilizing techniques to minimize radiation dose exposure, including the use of iterative  reconstruction and automated exposure control  Enteric contrast was not administered  FINDINGS: Study is limited secondary to lack of IV contrast  ABDOMEN LOWER CHEST:  There is a 3 mm nodule seen within the right lower lobe  LIVER/BILIARY TREE:  Unremarkable  GALLBLADDER:  No calcified gallstones  No pericholecystic inflammatory change  SPLEEN:  Unremarkable  PANCREAS:  Unremarkable  ADRENAL GLANDS:  Unremarkable  KIDNEYS/URETERS: There is a hypodense lesion with a calcification in the superior pole of the left kidney  There is no evidence of hydronephrosis  STOMACH AND BOWEL:  Prior right hemicolectomy is seen  The remaining colon is unremarkable  There is a diverting ileostomy in the right lower quadrant  APPENDIX:  Absent ABDOMINOPELVIC CAVITY:  There has been interval removal of the pigtail catheter  Inflammatory changes within the abdomen and pelvis is visualized  The previously visualized the inferior tip of liver is unchanged compared to prior study  Unchanged fluid collection adjacent to the right common iliac artery is noted  VESSELS:  Atherosclerotic changes are present  No evidence of aneurysm  PELVIS REPRODUCTIVE ORGANS:  Unremarkable for patient's age  URINARY BLADDER:  Diffuse thickening of the urinary bladder wall ABDOMINAL WALL/INGUINAL REGIONS:  Anterior abdominal postoperative defect is seen  OSSEOUS STRUCTURES:  No acute fracture or destructive osseous lesion  Impression: Inflammatory changes in the abdomen and pelvis with small fluid collections unchanged from prior study  Workstation performed: [id number]     Xr Chest Portable    Result Date: 7/9/2018  Narrative: CHEST INDICATION:   Central line placement  COMPARISON:  7/8/2018 EXAM PERFORMED/VIEWS:  XR CHEST PORTABLE Images: 2 (duplicate image re-windowed with line enhancement technique) FINDINGS:  Monitoring leads and clips project over the chest  Interval placement of right IJ central line, tip terminates in the region of the SVC  No pneumothorax  Cardiomediastinal silhouette appears unremarkable  Left retrocardiac atelectasis or infiltrate  Mild curvature upper thoracic spine convex left  Impression: No pneumothorax following central line placement  Left retrocardiac atelectasis or infiltrate  Workstation performed: [id number]     Ir Tube Check    Result Date: 8/2/2018  Narrative: Tube check Clinical History: No output Fluoroscopy time:  2 MINUTES ( 2 mGy ) Contrast: 5 mL of iohexol (OMNIPAQUE) Images: 2 Procedure: A  view demonstrates a left lower quadrant pigtail catheter  Contrast injection shows no residual abscess cavity with drainage to the skin  The catheter was transected and removed without incident  The patient tolerated the procedure well and left the department in stable condition  Impression: Impression: Resolution of the abscess cavity with removal of the drainage catheter  Workstation performed: [id number]     Ir Tube Check    Result Date: 7/18/2018  Narrative: Drainage catheter evaluation Indication: Left abdominal abscess status post catheter drainage Procedure: The left drainage catheter was hand injected with contrast and fluoroscopic spot images were obtained  Contrast: 6 cc Omnipaque-300 Fluoroscopy time: 0 4 minutes Images: 3 Findings: The catheter remains in satisfactory position   There is a small residual cavity surrounding the catheter loop which decompresses well  Output still appears cloudy  No fistula seen to the bowel  Impression: Impression: Small residual cavity with cloudy output  Plan: He was instructed to stop catheter flushing and follow-up in one week for tube check possible removal Workstation performed: [id number]     Ir Tube Check    Result Date: 7/9/2018  Narrative: Abscess tube check Clinical History: Follow-up of left upper quadrant collection  Contrast: 3 mL Omnipaque 300 Fluoro time: 0 2 minutes Number of Images: 4 Technique: The patient was brought to the interventional radiology suite and placed supine on the table  After a  view was obtained, contrast was injected into the abscess drainage catheter and multiple images were obtained  Findings: The collection is slightly smaller than seen previously  The catheter functions well and completely decompresses the collection  Since there is still slightly purulent output, the catheter was left in place  Impression: Impression: Persistent residual collection with purulent drainage  The catheter functions well  The catheter was left in place  Patient will return in several days for follow-up  Workstation performed: [id number]     Xr Chest Portable Icu    Result Date: 7/10/2018  Narrative: CHEST INDICATION:   Central line placement  COMPARISON:  7/8/2018 EXAM PERFORMED/VIEWS:  XR CHEST PORTABLE ICU FINDINGS:  Right internal jugular central line tip projects over the superior vena cava  No pneumothorax Cardiomediastinal silhouette appears unremarkable  No acute infiltrates  Unchanged mild left basilar opacity representing small pleural effusion with mild atelectasis  Osseous structures appear within normal limits for patient age  Impression: No pneumothorax status post right internal jugular central line placement  Persistent small left pleural effusion and atelectasis   Workstation performed: [id number]     Ct Abdomen Pelvis W Contrast    Result Date: 7/15/2018  Narrative: CT ABDOMEN AND PELVIS WITH IV CONTRAST INDICATION:   Elevated WBC with last Ct scan showing possible abscesses  61-year-old man with history of Crohn's disease, post right hemicolectomy in 2005  Subsequent transverse colon perforation  Diverting ileostomy performed in May 7527, complicated  by intra-abdominal abscess  Persistent leukocytosis  Evaluate for persistent or recurrent intra-abdominal abscess  COMPARISON: CTs of the abdomen and pelvis from June 24, 2018 and July 8, 2018 TECHNIQUE:  CT examination of the abdomen and pelvis was performed  Axial, sagittal, and coronal 2D reformatted images were created from the source data and submitted for interpretation  Radiation dose length product (DLP) for this visit:  417 34 mGy-cm   This examination, like all CT scans performed in the [facility], was performed utilizing techniques to minimize radiation dose exposure, including the use of iterative  reconstruction and automated exposure control  IV Contrast:  50 mL of iohexol (OMNIPAQUE) 100 mL of iohexol (OMNIPAQUE) Enteric Contrast:  Enteric contrast was administered  FINDINGS: ABDOMEN LOWER CHEST:  No clinically significant abnormality identified in the visualized lower chest   Subsegmental atelectasis in the base of the left lower lobe  Small left pleural effusion  LIVER/BILIARY TREE:  Unremarkable  GALLBLADDER:  No calcified gallstones  No pericholecystic inflammatory change  SPLEEN:  Unremarkable  PANCREAS:  Unremarkable  ADRENAL GLANDS:  Unremarkable  KIDNEYS/URETERS:  Small cysts  No hydronephrosis or ureterectasis  STOMACH AND BOWEL:  Stomach normal in appearance  Prior right hemicolectomy  Remaining colon unremarkable  Diverging ileostomy in the right lower quadrant  APPENDIX:  Absent  ABDOMINOPELVIC CAVITY: No lymphadenopathy or mass  No generalized ascites  Left-sided pigtail catheter in place with resolution of fluid collection demonstrated on CT from 6/24/2018    Adjacent 1 4 cm collection unchanged  Persistent thin-walled collection anterior to the right common iliac artery, measuring 1 5 x 2 0 cm   1 5 x 1 7 cm collection adjacent to the inferior tip of the liver, communicating with a gas-filled tract, extending medially to the ileocolic anastomosis, also unchanged  No new intra-abdominal fluid collections  No pneumoperitoneum  VESSELS:  Atherosclerotic changes are present  No evidence of aneurysm  PELVIS REPRODUCTIVE ORGANS:  Prostate unremarkable  URINARY BLADDER:  Generalized mural thickening  Otherwise unremarkable  ABDOMINAL WALL/INGUINAL REGIONS:  Middle anterior abdominal postoperative defect  OSSEOUS STRUCTURES:  No acute fracture or destructive osseous lesion  Impression: 1  Left basilar subsegmental atelectasis and small left pleural effusion  2   Small fluid collections and gas filled tract communicating with the ileocolic anastomosis, all unchanged since 7/8/2018  3   No evidence of new intra-abdominal abscess  Workstation performed: [id number]       Microbiology: cultures obtained in emergency department include blood cx x2      Urinalysis:       Invalid input(s): URIBILINOGEN     Urine Micro:        EKG, Pathology, and Other Studies: I have personally reviewed pertinent reports        Medications given in Emergency Department     Medication Administration - last 24 hours from 08/06/2018 0438 to 08/07/2018 0438       Date/Time Order Dose Route Action Action by     08/07/2018 0240 sodium chloride 0 9 % bolus 1,000 mL 0 mL Intravenous Stopped Marlon Fletcher RN     08/07/2018 0114 sodium chloride 0 9 % bolus 1,000 mL 1,000 mL Intravenous Michael Garcia RN     08/07/2018 0113 ondansetron [facility] injection 4 mg 4 mg Intravenous Given Sheryl Garcia RN     08/07/2018 0113 fentanyl citrate (PF) 100 MCG/2ML 50 mcg 50 mcg Intravenous Given Sheryl Garcia RN     08/07/2018 0154 sodium chloride 0 9 % bolus 1,000 mL 0 mL Intravenous Stopped Sheryl Garcia RN 08/07/2018 0114 sodium chloride 0 9 % bolus 1,000 mL 1,000 mL Intravenous [address] [facility]     08/07/2018 0147 fentanyl citrate (PF) 100 MCG/2ML 50 mcg 50 mcg Intravenous Given Leela Shukla RN     08/07/2018 0154 sodium chloride 0 9 % bolus 1,000 mL 0 mL Intravenous Stopped Leela Shukla, CANDICE     08/07/2018 0154 sodium chloride 0 9 % bolus 1,000 mL 1,000 mL Intravenous [address] Leela Shukla RN     08/07/2018 0355 sodium chloride 0 9 % bolus 1,000 mL 0 mL Intravenous Stopped Froilan Lopez RN     08/07/2018 0255 sodium chloride 0 9 % bolus 1,000 mL 1,000 mL Intravenous [address] Froilan Lopez RN     08/07/2018 0415 albumin human (FLEXBUMIN) 5 % injection 12 5 g 0 g Intravenous Stopped Froilan Lopez RN     08/07/2018 0338 albumin human (FLEXBUMIN) 5 % injection 12 5 g 12 5 g Intravenous [address] Froilan Lopez RN     08/07/2018 8275 fentanyl citrate (PF) 100 MCG/2ML 75 mcg 75 mcg Intravenous Given Froilan Lopez RN     08/07/2018 0424 sodium chloride 0 9 % infusion 200 mL/hr Intravenous [address] Froilan Lopez RN     08/07/2018 0434 oxyCODONE (ROXICODONE) immediate release tablet 10 mg 10 mg Oral Given Froilan Lopez RN          Assessment and Plan     Abdominal pain with ileostomy in place  · Patient is a this pain started yesterday and became progressively worse  Denies any change in ostomy output    Abdominal pain possibly secondary to Crohn's disease versus abscess  · CT abd- inflamm changes in abdomen and pelvis with small fluid collections unchaged  · WBC 18 98  · PRN pain meds  · C diff pending  · Blood cx x2  · Urine culture pending  · General surgery following  · Monitor ostomy output     Acute kidney injury  · Received 4 L fluid bolus in the ED  · IV fluids 200 cc/hour  · Creatinine 3 43 (baseline 0 8)  · Continue to monitor    Hypotension  · Patient received 4 L fluid bolus in ED along with albumin 12 5g x3; patient's BP continues to be soft  · 1L fluid bolus once on floor   · 100mg hydrocortisone for possible adrenal insufficiency   · Hold BP medication     Crohn disease  · Possible flare up   · CT abdomen-inflammatory changes in abdomen and pelvis  · Mesalamine mean 500 mg t i d     COPD  · Patient states he has oxygen at home  States he only uses oxygen when he feels it is necessary  Home O2 2L when needed  · Albuterol PRN    Alcohol abuse  · Patient denies any alcohol use in the last 5 months  · Monitor for signs of alcohol withdrawal     Anemia  · No signs of active bleed  · Hemoglobin 9 8  · Continue to monitor    Hypertension  · Hold blood pressure meds due to hypotension    Hyperlipidemia  · Stable     Nicotine dependence  · Nicotine patch    Ambulatory dysfunction  · Patient states he uses walker to get around at home  Code Status: Level 1 - Full Code  VTE Pharmacologic Prophylaxis: Heparin   VTE Mechanical Prophylaxis: sequential compression device  Admission Status: INPATIENT     Admission Time  I spent 30 minutes admitting the patient  This involved direct patient contact where I performed a full history and physical, reviewing previous records, and reviewing laboratory and other diagnostic studies      Pierre Calderón, DO  Internal Medicine  PGY-1 11-15    131<L>  |  95<L>  |  106.0<H>  ----------------------------<  214<H>  6.2<HH>   |  27.0  |  2.27<H>    Ca    8.1<L>      15 Nov 2020 01:12    TPro  5.2<L>  /  Alb  3.4  /  TBili  0.5  /  DBili  x   /  AST  22  /  ALT  22  /  AlkPhos  220<H>  11-15                        8.6    9.05  )-----------( 382      ( 15 Nov 2020 01:12 )             29.1

## 2020-11-16 DIAGNOSIS — J44.9 COPD, SEVERE (HCC): ICD-10-CM

## 2020-12-21 DIAGNOSIS — J44.9 COPD, SEVERE (HCC): ICD-10-CM

## 2020-12-22 DIAGNOSIS — J44.9 COPD, SEVERE (HCC): ICD-10-CM

## 2020-12-22 RX ORDER — ALBUTEROL SULFATE 2.5 MG/3ML
2.5 SOLUTION RESPIRATORY (INHALATION) EVERY 4 HOURS PRN
Qty: 75 ML | Refills: 7 | Status: SHIPPED | OUTPATIENT
Start: 2020-12-22

## 2020-12-23 ENCOUNTER — APPOINTMENT (OUTPATIENT)
Dept: URBAN - METROPOLITAN AREA CLINIC 232 | Age: 60
Setting detail: DERMATOLOGY
End: 2020-12-23

## 2020-12-23 DIAGNOSIS — D22 MELANOCYTIC NEVI: ICD-10-CM

## 2020-12-23 DIAGNOSIS — L81.4 OTHER MELANIN HYPERPIGMENTATION: ICD-10-CM

## 2020-12-23 DIAGNOSIS — L82.0 INFLAMED SEBORRHEIC KERATOSIS: ICD-10-CM

## 2020-12-23 DIAGNOSIS — L82.1 OTHER SEBORRHEIC KERATOSIS: ICD-10-CM

## 2020-12-23 DIAGNOSIS — D18.0 HEMANGIOMA: ICD-10-CM

## 2020-12-23 PROBLEM — D22.5 MELANOCYTIC NEVI OF TRUNK: Status: ACTIVE | Noted: 2020-12-23

## 2020-12-23 PROBLEM — D18.01 HEMANGIOMA OF SKIN AND SUBCUTANEOUS TISSUE: Status: ACTIVE | Noted: 2020-12-23

## 2020-12-23 PROCEDURE — OTHER COUNSELING: OTHER

## 2020-12-23 PROCEDURE — 99203 OFFICE O/P NEW LOW 30 MIN: CPT | Mod: 25

## 2020-12-23 PROCEDURE — 17110 DESTRUCT B9 LESION 1-14: CPT

## 2020-12-23 PROCEDURE — OTHER LIQUID NITROGEN: OTHER

## 2020-12-23 RX ORDER — ALBUTEROL SULFATE 2.5 MG/3ML
2.5 SOLUTION RESPIRATORY (INHALATION) EVERY 4 HOURS
Qty: 75 ML | Refills: 6 | Status: SHIPPED | OUTPATIENT
Start: 2020-12-23 | End: 2021-03-01

## 2020-12-23 ASSESSMENT — LOCATION DETAILED DESCRIPTION DERM
LOCATION DETAILED: LEFT MEDIAL UPPER BACK
LOCATION DETAILED: SUPERIOR THORACIC SPINE
LOCATION DETAILED: LEFT SUPERIOR MEDIAL MIDBACK
LOCATION DETAILED: RIGHT SUPERIOR FOREHEAD

## 2020-12-23 ASSESSMENT — LOCATION SIMPLE DESCRIPTION DERM
LOCATION SIMPLE: RIGHT FOREHEAD
LOCATION SIMPLE: UPPER BACK
LOCATION SIMPLE: LEFT UPPER BACK
LOCATION SIMPLE: LEFT LOWER BACK

## 2020-12-23 ASSESSMENT — LOCATION ZONE DERM
LOCATION ZONE: FACE
LOCATION ZONE: TRUNK

## 2020-12-23 NOTE — HPI: SKIN LESIONS
How Severe Is Your Skin Lesion?: mild
Have Your Skin Lesions Been Treated?: been treated
Is This A New Presentation, Or A Follow-Up?: Skin Lesion
When Was It Treated?: 01/01/2015

## 2020-12-23 NOTE — PROCEDURE: LIQUID NITROGEN
Post-Care Instructions: I reviewed with the patient in detail post-care instructions. Patient knows to avoid picking at any of the treated lesions. Pt may apply Vaseline and bandage to crusted or scabbing areas if needed for comfort.
Detail Level: Detailed
Include Z78.9 (Other Specified Conditions Influencing Health Status) As An Associated Diagnosis?: No
Consent: The patient's consent was obtained including but not limited to risks of crusting, scabbing, blistering, scarring, darker or lighter pigmentary change, recurrence, incomplete removal and infection.
Medical Necessity Information: It is in your best interest to select a reason for this procedure from the list below. All of these items fulfill various CMS LCD requirements except the new and changing color options.
Duration Of Freeze Thaw-Cycle (Seconds): 15-20
Number Of Freeze-Thaw Cycles: 1 freeze-thaw cycle
Medical Necessity Clause: This procedure was medically necessary because the lesions that were treated were:

## 2021-01-16 DIAGNOSIS — I25.10 CORONARY ARTERY DISEASE INVOLVING NATIVE CORONARY ARTERY OF NATIVE HEART WITHOUT ANGINA PECTORIS: ICD-10-CM

## 2021-01-19 RX ORDER — ATORVASTATIN CALCIUM 40 MG/1
TABLET, FILM COATED ORAL
Qty: 90 TABLET | Refills: 0 | Status: SHIPPED | OUTPATIENT
Start: 2021-01-19 | End: 2021-04-30

## 2021-02-17 ENCOUNTER — TELEPHONE (OUTPATIENT)
Dept: INTERNAL MEDICINE CLINIC | Facility: CLINIC | Age: 61
End: 2021-02-17

## 2021-03-05 DIAGNOSIS — J44.9 COPD, SEVERE (HCC): ICD-10-CM

## 2021-03-05 NOTE — TELEPHONE ENCOUNTER
I don't mind prescribing this but he should be seen in the office  Can you call to see if he wants to be seen?

## 2021-03-10 NOTE — PROGRESS NOTES
Amita Azul PA-C will be his assistant    Progress Note - Pulmonary   Oneita Sheldon [address] [place] [address] y o  male MRN: [id number]  Unit/Bed#: [facility] 631-01 Encounter: [id number]      Assessment/Plan:    1  Acute hypoxic respiratory failure s/p bowel perforation and diverting ileostomy-improving  1  Continue to titrate oxygen therapy to maintain SpO2>88%  2  Pulmonary toilet: Incentive spirometry, flutter valve and Increase ambulation and time OOB to chair  2  Chronic obstructive pulmonary disease with acute exacerbation  1  Continue bronchodilators  2  Continue symbicort BID  3  Continue Prednisone taper will decrease from 50mg to 40 mg tomorrow with reduction by 10 mg every 3 days  3  Chronic systolic heart failure with ejection fraction of 25% with cardiomyopathy  1  Has received one dose of lasix on 5/26 with minimal improvement, no signs of acute volume overload at this time  2  Daily weights  3  Strict I&O   4  Alcohol abuse-no signs of withdraw   5  Bowel perforation status post diverting ileostomy  1  Management per colorectal     We will sign off, please call with questions    Subjective:   Shanta Akers was seen sitting in the chair upon entering the room  He reports his breathing is easier today  He continues to have a productive cough, but cannot identify sputum color  Denies: chest pain, pain with inspiration, chills, night sweats, vomiting, bronchospasm or hemoptysis    Objective:         Vitals: Blood pressure 109/67, pulse 73, temperature 97 6 °F (36 4 °C), temperature source Axillary, resp  rate 18, height 5' 6" (1 676 m), weight 84 1 kg (185 lb 6 5 oz), SpO2 95 %  , 4LNC, Body mass index is 29 93 kg/m²        Intake/Output Summary (Last 24 hours) at 05/28/18 1219  Last data filed at 05/28/18 0745   Gross per 24 hour   Intake              580 ml   Output             3135 ml   Net            -2555 ml         Physical Exam  Gen: Awake, alert, oriented x 3, no acute distress  HEENT: Mucous membranes moist, no oral lesions, no thrush, oxygen via NC  NECK: No accessory muscle use, JVP not elevated  Cardiac: Regular, single S1, single S2, no murmurs, no rubs, no gallops  Lungs: clear bilaterally, no wheezes, rhonchi or rales  Abdomen: ileostomy, SANTA drain, active bowel sounds, tender,   Extremities: no cyanosis, no clubbing, no edema    Labs: I have personally reviewed pertinent lab results  , CBC:   Lab Results   Component Value Date    WBC 14 69 (H) 05/28/2018    HGB 9 9 (L) 05/28/2018    HCT 31 5 (L) 05/28/2018     (H) 05/28/2018     05/28/2018    MCH 31 6 05/28/2018    MCHC 31 4 05/28/2018    RDW 16 2 (H) 05/28/2018    MPV 10 3 05/28/2018   , CMP:   Lab Results   Component Value Date     05/28/2018    K 4 4 05/28/2018     05/28/2018    CO2 33 (H) 05/28/2018    ANIONGAP 4 05/28/2018    BUN 14 05/28/2018    CREATININE 0 69 05/28/2018    GLUCOSE 138 05/28/2018    CALCIUM 8 2 (L) 05/28/2018    EGFR 107 05/28/2018     Imaging and other studies: none      Nabor Peralta

## 2021-04-13 ENCOUNTER — TELEPHONE (OUTPATIENT)
Dept: PULMONOLOGY | Facility: CLINIC | Age: 61
End: 2021-04-13

## 2021-04-13 ENCOUNTER — OFFICE VISIT (OUTPATIENT)
Dept: PULMONOLOGY | Facility: CLINIC | Age: 61
End: 2021-04-13
Payer: COMMERCIAL

## 2021-04-13 VITALS
OXYGEN SATURATION: 91 % | DIASTOLIC BLOOD PRESSURE: 64 MMHG | HEART RATE: 97 BPM | TEMPERATURE: 97.2 F | HEIGHT: 66 IN | BODY MASS INDEX: 26.68 KG/M2 | WEIGHT: 166 LBS | SYSTOLIC BLOOD PRESSURE: 118 MMHG

## 2021-04-13 DIAGNOSIS — F17.210 CIGARETTE NICOTINE DEPENDENCE WITHOUT COMPLICATION: ICD-10-CM

## 2021-04-13 DIAGNOSIS — K50.918 CROHN'S DISEASE WITH OTHER COMPLICATION, UNSPECIFIED GASTROINTESTINAL TRACT LOCATION (HCC): ICD-10-CM

## 2021-04-13 DIAGNOSIS — J44.9 STAGE 4 VERY SEVERE COPD BY GOLD CLASSIFICATION (HCC): Primary | ICD-10-CM

## 2021-04-13 PROBLEM — Z93.2 ILEOSTOMY IN PLACE (HCC): Status: RESOLVED | Noted: 2018-05-23 | Resolved: 2021-04-13

## 2021-04-13 PROBLEM — R10.9 ABDOMINAL PAIN: Status: RESOLVED | Noted: 2018-08-07 | Resolved: 2021-04-13

## 2021-04-13 PROBLEM — K65.1 INTRA-ABDOMINAL ABSCESS (HCC): Status: RESOLVED | Noted: 2018-07-08 | Resolved: 2021-04-13

## 2021-04-13 PROBLEM — N17.9 AKI (ACUTE KIDNEY INJURY) (HCC): Status: RESOLVED | Noted: 2018-07-08 | Resolved: 2021-04-13

## 2021-04-13 PROBLEM — R26.2 AMBULATORY DYSFUNCTION: Status: RESOLVED | Noted: 2018-08-07 | Resolved: 2021-04-13

## 2021-04-13 PROCEDURE — 99214 OFFICE O/P EST MOD 30 MIN: CPT | Performed by: INTERNAL MEDICINE

## 2021-04-13 RX ORDER — ALBUTEROL SULFATE 2.5 MG/3ML
2.5 SOLUTION RESPIRATORY (INHALATION) 4 TIMES DAILY
Qty: 120 VIAL | Refills: 5 | Status: SHIPPED | OUTPATIENT
Start: 2021-04-13 | End: 2021-11-22

## 2021-04-13 RX ORDER — RIBOFLAVIN (VITAMIN B2) 100 MG
100 TABLET ORAL DAILY
COMMUNITY

## 2021-04-13 NOTE — ASSESSMENT & PLAN NOTE
- discussed tobacco cessation for 4 minutes  - he is not interested in quitting at this time  - Reviewed deleterious effects of smoking on the body  - Trialed Chantix in past but could not tolerate  Has used Nicotine patches but could not tolerate  - Qualifies for screening CT scan based on smoking history  He is agreeable

## 2021-04-13 NOTE — PROGRESS NOTES
Pulmonary Follow Up Note   Aletha Rivers 64 y o  male MRN: [id number]  4/13/2021      Referring provider: Dr Damico  and Plan:    Stage 4 very severe COPD by GOLD classification ([place] [address] )  No recent exacerbations  - cont  nebulizers as needed  - Sample Trelegy provided  Reminded to rinse mouth after use  - Trial Trelegy coupon  - discussed lung transplant but he is not candidate due to smoking/tobacco use  - Discussed EBV therapy but he declines at this time  - Declines COVID vaccine  - Refer to pulmonary rehab  He is unsure if he will participate but he would like to consider it  Crohn disease ([facility] )  Currently stable  On sulfasalazine  Cigarette nicotine dependence without complication  - discussed tobacco cessation for 4 minutes  - he is not interested in quitting at this time  - Reviewed deleterious effects of smoking on the body  - Trialed Chantix in past but could not tolerate  Has used Nicotine patches but could not tolerate  - Qualifies for screening CT scan based on smoking history  He is agreeable  Diagnoses and all orders for this visit:    Stage 4 very severe COPD by GOLD classification ([place] [address] )    Crohn's disease with other complication, unspecified gastrointestinal tract location [facility])    Cigarette nicotine dependence without complication  -     CT lung screening program; Future    COPD, severe (HCC)  -     ipratropium (ATROVENT) 0 02 % nebulizer solution; Take 1 vial (0 5 mg total) by nebulization 4 (four) times a day  -     albuterol (2 5 mg/3 mL) 0 083 % nebulizer solution; Take 1 vial (2 5 mg total) by nebulization 4 (four) times a day    Other orders  -     Multiple Vitamins-Minerals (Centrum Silver 50+Men) TABS; Take by mouth  -     Ascorbic Acid (vitamin C) 100 MG tablet; Take 100 mg by mouth daily    Plan of care discussed in detail with patient  Concerns and questions addressed  Return for follow-up in 6 months      History of Present Illness   HPI:  Chelsea Dennison King Josey is a 64 y o  male who presents for follow-up of COPD  He was last seen by me in February 2020  Since then, he states that he is unchanged overall  He had no ER visits or hospitalizations since I last saw it  He has an occasional cough  He estimates that he can walk a few feet and then stops to catch his breath  He can walk around the store using a grocery cart  He is smoking mini cigars about 3-4 daily  Uses oxygen with sleep "sometimes" - about 3-4 times per week  He uses his Albuterol nebulizer 3-4 times per day as needed  He had his colostomy reversed in 2018  Crohn's has been stable since then  He is taking Sulfasalazine  Refuses COVID vaccine  Quit drinking in 2018  Review of Systems   positive as mentioned above and negative otherwise    Historical Information   Past Medical History:   Diagnosis Date    Anxiety     Asthma     Cardiac disease     Cervical stenosis (uterine cervix)     Chest pain     Chronic kidney disease     Colitis     Colon polyps     COPD (chronic obstructive pulmonary disease) (HCC)     2L @ HS and PRN    Coronary artery disease     Diverticulitis     Esophageal reflux     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HCC)     Hyperlipidemia     Hypertension     IBD (inflammatory bowel disease)     [facility]     Old myocardial infarction     Perforation of colon (HCC)     Type 2 diabetes, diet controlled ([facility] [facility] )     Ulcerative colitis ([facility] )      Past Surgical History:   Procedure Laterality Date    ANGIOPLASTY      APPENDECTOMY      COLON SURGERY      COLONOSCOPY N/A 10/24/2016    Procedure: COLONOSCOPY;  Surgeon: Shaina Quispe MD;  [facility]; Service:     CORONARY ANGIOPLASTY WITH STENT PLACEMENT      ESOPHAGOGASTRODUODENOSCOPY N/A 10/24/2016    Procedure: ESOPHAGOGASTRODUODENOSCOPY (EGD); Surgeon: Shaina Quispe MD;  [facility];   Service:     EXPLORATORY LAPAROTOMY W/ BOWEL RESECTION N/A 5/22/2018 Procedure: EXPLORATORY LAPAROTOMY, ILIOCOLECTOMY, ILIOCOLONIC ANASTAMOSIS, LOOP ILIOSTOMY, REPAIR OF SEROSAL TEAR, EXTENSIVE LYSIS OF ADHESIONS, WOUND VAC PLACEMENT;  Surgeon: Shaina Quispe MD;  [facility];  Service: Colorectal    HEMICOLECTOMY      ILEOSTOMY CLOSURE N/A 10/5/2018    Procedure: Donvanita Ao;  Surgeon: Shaina Quispe MD;  [facility];  Service: Colorectal    OTHER SURGICAL HISTORY      stent indications acute myocardial infarction    HI COLONOSCOPY FLX DX W/COLLJ SPEC WHEN PFRMD N/A 2/6/2018    Procedure: COLONOSCOPY;  Surgeon: Shaina Quispe MD;  [facility]; Service: Colorectal    HI COLONOSCOPY FLX DX W/COLLJ SPEC WHEN PFRMD N/A 10/4/2018    Procedure: COLONOSCOPY;  Surgeon: Shaina Quispe MD;  [facility];   Service: Colorectal    TONSILLECTOMY       Family History   Problem Relation Age of Onset    Coronary artery disease Father     Crohn's disease Father     Stroke Father     Diabetes Family          Meds/Allergies     Current Outpatient Medications:     albuterol (2 5 mg/3 mL) 0 083 % nebulizer solution, Take 1 vial (2 5 mg total) by nebulization every 4 (four) hours as needed for wheezing or shortness of breath, Disp: 75 mL, Rfl: 7    albuterol (2 5 mg/3 mL) 0 083 % nebulizer solution, TAKE 1 VIAL (2 5 MG TOTAL) BY NEBULIZATION EVERY 4 (FOUR) HOURS, Disp: 75 mL, Rfl: 6    albuterol (PROVENTIL HFA,VENTOLIN HFA) 90 mcg/act inhaler, Inhale 1 puff every 6 (six) hours as needed for wheezing, Disp: 8 5 Inhaler, Rfl: 1    Ascorbic Acid (vitamin C) 100 MG tablet, Take 100 mg by mouth daily, Disp: , Rfl:     aspirin 81 mg chewable tablet, Chew 1 tablet (81 mg total) daily, Disp: 90 tablet, Rfl: 0    atorvastatin (LIPITOR) 40 mg tablet, TAKE 1 TABLET BY MOUTH DAILY WITH DINNER, Disp: 90 tablet, Rfl: 0    folic acid (FOLVITE) 1 mg tablet, TAKE 1 TABLET BY MOUTH EVERY DAY, Disp: 30 tablet, Rfl: 0    gabapentin (NEURONTIN) 300 mg capsule, Take 1 capsule (300 mg total) by mouth 3 (three) times a day, Disp: 90 capsule, Rfl: 3    ipratropium (ATROVENT) 0 02 % nebulizer solution, Take 1 vial (0 5 mg total) by nebulization 4 (four) times a day, Disp: 300 mL, Rfl: 5    Multiple Vitamins-Minerals (Centrum Silver 50+Men) TABS, Take by mouth, Disp: , Rfl:     sulfaSALAzine (AZULFIDINE) 500 mg tablet, Take 1 tablet (500 mg total) by mouth 4 (four) times a day, Disp: 360 tablet, Rfl: 0    Albuterol Sulfate (PROAIR RESPICLICK) 832 (90 Base) MCG/ACT AEPB, Inhale 1 puff every 4 (four) hours as needed (SOB) (Patient not taking: Reported on 4/13/2021), Disp: 1 each, Rfl: 3    Nebulizers (AERONEB GO NEBULIZER HANDSET) MISC, by Does not apply route 2 (two) times a day as needed (wheezing) (Patient not taking: Reported on 2/11/2020), Disp: 1 each, Rfl: 0  Allergies   Allergen Reactions    Medical Tape     Other      Brown cloth band aids       Latex Rash       Vitals: Blood pressure 118/64, pulse 97, temperature (!) 97 2 °F (36 2 °C), temperature source Tympanic, height 5' 6" (1 676 m), weight 75 3 kg (166 lb), SpO2 91 %  Body mass index is 26 79 kg/m²  Oxygen Therapy  SpO2: 91 %  Oxygen Therapy: None (Room air)      Physical Exam    GEN: WDWN, nad, comfortable  HEENT: NCAT, EOMI  CVS: Regular, no m/r/g  LUNGS: occasional mild wheeze, good air movement  ABD: soft, nd  EXT: No c/c/e  NEURO: No focal deficits  MS: Moving all extremities  SKIN: warm, dry  PSYCH: calm, cooperative      Labs: I have personally reviewed pertinent lab results    Lab Results   Component Value Date    WBC 11 83 (H) 10/15/2018    HGB 7 8 (L) 10/15/2018    HCT 25 5 (L) 10/15/2018    MCV 87 10/15/2018     10/15/2018     Lab Results   Component Value Date    GLUCOSE 92 07/08/2018    CALCIUM 7 7 (L) 10/15/2018     05/29/2015    K 3 9 10/15/2018    CO2 23 10/15/2018     10/15/2018    BUN 7 10/15/2018    CREATININE 1 16 10/15/2018     No results found for: IGE  Lab Results   Component Value Date    ALT 21 08/30/2018    AST 10 08/30/2018    ALKPHOS 133 (H) 08/30/2018    BILITOT 0 58 05/21/2015     Labs per my interpretation show  Mild anemia, normal creatinine    Imaging and other studies: I have personally reviewed pertinent films in PACS   chest x-ray February 2020 per my interpretation shows clear bilateral lungs, no acute findings  Pulmonary function testing:  Every 2020 per my interpretation shows very severe obstruction with an FEV1 0 68 L, 21% predicted    EKG, Pathology, and Other Studies: I have personally reviewed pertinent reports      echo 2018: EF 50%    [facility]

## 2021-04-13 NOTE — ASSESSMENT & PLAN NOTE
No recent exacerbations  - cont  nebulizers as needed  - Sample Trelegy provided  Reminded to rinse mouth after use  - Trial Trelegy coupon  - discussed lung transplant but he is not candidate due to smoking/tobacco use  - Discussed EBV therapy but he declines at this time  - Declines COVID vaccine  - Refer to pulmonary rehab  He is unsure if he will participate but he would like to consider it

## 2021-04-16 NOTE — TELEPHONE ENCOUNTER
Spoke with patient and explained that when we actually ran his ins it came back as out of network  I informed him he could contact his ins and give them our tax id and NPI and he might have out of network benefits

## 2021-04-30 DIAGNOSIS — I25.10 CORONARY ARTERY DISEASE INVOLVING NATIVE CORONARY ARTERY OF NATIVE HEART WITHOUT ANGINA PECTORIS: ICD-10-CM

## 2021-04-30 RX ORDER — ATORVASTATIN CALCIUM 40 MG/1
TABLET, FILM COATED ORAL
Qty: 90 TABLET | Refills: 0 | Status: SHIPPED | OUTPATIENT
Start: 2021-04-30 | End: 2021-07-28

## 2021-05-14 ENCOUNTER — TELEPHONE (OUTPATIENT)
Dept: PULMONOLOGY | Facility: CLINIC | Age: 61
End: 2021-05-14

## 2021-06-07 NOTE — ASSESSMENT & PLAN NOTE
Bed: ED05  Expected date:   Expected time:   Means of arrival:   Comments:  triage   Ostomy output stable  Diet as tolerated  Replete lytes PRN

## 2021-06-10 DIAGNOSIS — J44.9 CHRONIC OBSTRUCTIVE PULMONARY DISEASE, UNSPECIFIED COPD TYPE (HCC): Primary | ICD-10-CM

## 2021-06-10 RX ORDER — ALBUTEROL SULFATE 90 UG/1
2 AEROSOL, METERED RESPIRATORY (INHALATION) EVERY 6 HOURS PRN
Qty: 8.5 G | Refills: 5 | Status: SHIPPED | OUTPATIENT
Start: 2021-06-10 | End: 2021-12-14

## 2021-07-07 DIAGNOSIS — M54.12 CERVICAL RADICULOPATHY: ICD-10-CM

## 2021-07-08 RX ORDER — GABAPENTIN 300 MG/1
300 CAPSULE ORAL 3 TIMES DAILY
Qty: 90 CAPSULE | Refills: 3 | Status: SHIPPED | OUTPATIENT
Start: 2021-07-08 | End: 2022-06-29

## 2021-07-08 NOTE — TELEPHONE ENCOUNTER
Gabapentin refill sent  Please schedule patient for an annual follow-up  He has not been seen in clinic for over a year and no recent blood work in the past 3 years  Thank you

## 2021-08-17 DIAGNOSIS — J44.9 STAGE 4 VERY SEVERE COPD BY GOLD CLASSIFICATION (HCC): ICD-10-CM

## 2021-10-18 ENCOUNTER — TELEPHONE (OUTPATIENT)
Dept: INTERNAL MEDICINE CLINIC | Facility: CLINIC | Age: 61
End: 2021-10-18

## 2021-10-18 DIAGNOSIS — K50.918 CROHN'S DISEASE WITH OTHER COMPLICATION, UNSPECIFIED GASTROINTESTINAL TRACT LOCATION (HCC): ICD-10-CM

## 2021-10-19 RX ORDER — SULFASALAZINE 500 MG/1
500 TABLET ORAL 4 TIMES DAILY
Qty: 360 TABLET | Refills: 0 | Status: SHIPPED | OUTPATIENT
Start: 2021-10-19 | End: 2022-05-16

## 2021-11-22 DIAGNOSIS — J44.9 STAGE 4 VERY SEVERE COPD BY GOLD CLASSIFICATION (HCC): ICD-10-CM

## 2021-11-22 RX ORDER — ALBUTEROL SULFATE 2.5 MG/3ML
2.5 SOLUTION RESPIRATORY (INHALATION) 4 TIMES DAILY
Qty: 300 ML | Refills: 5 | Status: SHIPPED | OUTPATIENT
Start: 2021-11-22 | End: 2022-06-06

## 2021-12-14 DIAGNOSIS — J44.9 CHRONIC OBSTRUCTIVE PULMONARY DISEASE, UNSPECIFIED COPD TYPE (HCC): ICD-10-CM

## 2021-12-14 RX ORDER — ALBUTEROL SULFATE 90 UG/1
AEROSOL, METERED RESPIRATORY (INHALATION)
Qty: 18 G | Refills: 5 | Status: SHIPPED | OUTPATIENT
Start: 2021-12-14 | End: 2022-07-12

## 2022-04-23 DIAGNOSIS — J44.9 STAGE 4 VERY SEVERE COPD BY GOLD CLASSIFICATION (HCC): ICD-10-CM

## 2022-04-25 NOTE — TELEPHONE ENCOUNTER
Pt scheduled appt for 5/16/22 @ 11am w/ Dr Nilson Curtis in [place]  Can we please send this refill in for pt? He only has a couple of vials left   Thank you

## 2022-05-16 ENCOUNTER — OFFICE VISIT (OUTPATIENT)
Dept: PULMONOLOGY | Facility: CLINIC | Age: 62
End: 2022-05-16
Payer: COMMERCIAL

## 2022-05-16 VITALS
BODY MASS INDEX: 27.97 KG/M2 | OXYGEN SATURATION: 92 % | SYSTOLIC BLOOD PRESSURE: 120 MMHG | TEMPERATURE: 98.3 F | WEIGHT: 174 LBS | DIASTOLIC BLOOD PRESSURE: 68 MMHG | HEIGHT: 66 IN | HEART RATE: 110 BPM

## 2022-05-16 DIAGNOSIS — F17.210 CIGARETTE NICOTINE DEPENDENCE WITHOUT COMPLICATION: ICD-10-CM

## 2022-05-16 DIAGNOSIS — J44.9 STAGE 4 VERY SEVERE COPD BY GOLD CLASSIFICATION (HCC): Primary | ICD-10-CM

## 2022-05-16 PROCEDURE — 99214 OFFICE O/P EST MOD 30 MIN: CPT | Performed by: INTERNAL MEDICINE

## 2022-05-16 RX ORDER — FLUTICASONE FUROATE, UMECLIDINIUM BROMIDE AND VILANTEROL TRIFENATATE 100; 62.5; 25 UG/1; UG/1; UG/1
1 POWDER RESPIRATORY (INHALATION) DAILY
Qty: 60 BLISTER | Refills: 5 | Status: SHIPPED | OUTPATIENT
Start: 2022-05-16 | End: 2022-06-15

## 2022-05-16 NOTE — ASSESSMENT & PLAN NOTE
Stable, no recent exacerbations  - not on long-acting inhalers due to no insurance  - Rx Trelegy and samples given  - Cont  Nebulizer as needed  - discussed pulmonary rehab but he is not interested at the time  - did not have flu shot this year, does not want it   - received COVID vaccine x 2  - not a candidate for transplant due to active smoking

## 2022-05-16 NOTE — ASSESSMENT & PLAN NOTE
Still smoking 3-4 cigars daily  - not interested in quitting at this time  Encouraged him to quit smoking   - Trialed Chantix in past but could not tolerate  Used NRT patches in past but could not tolerate     - qualifies for screening CT but cost prohibitive

## 2022-05-16 NOTE — PROGRESS NOTES
Pulmonary Follow Up Note   Nathalia Mosqueda 63 y o  male MRN: [id number]  5/16/2022      Referring provider: Dr Ja Smith and Plan:    Stage 4 very severe COPD by GOLD classification ([facility] )  Stable, no recent exacerbations  - not on long-acting inhalers due to no insurance  - Rx Trelegy and samples given  - Cont  Nebulizer as needed  - discussed pulmonary rehab but he is not interested at the time  - did not have flu shot this year, does not want it   - received COVID vaccine x 2  - not a candidate for transplant due to active smoking  Cigarette nicotine dependence without complication  Still smoking 3-4 cigars daily  - not interested in quitting at this time  Encouraged him to quit smoking   - Trialed Chantix in past but could not tolerate  Used NRT patches in past but could not tolerate  - qualifies for screening CT but cost prohibitive      Diagnoses and all orders for this visit:    Stage 4 very severe COPD by GOLD classification ([facility] [address] )  -     fluticasone-umeclidinium-vilanterol (Trelegy Ellipta) 100-62 5-25 MCG/INH inhaler; Inhale 1 puff  in the morning  Rinse mouth after use       Cigarette nicotine dependence without complication    I discussed plan of care in detail with the patient and his daughter  Concerns and questions addressed  Return for follow-up in 1 year sooner if needed  History of Present Illness   HPI:  Nathalia Mosqueda is a 63 y o  male who presents for follow-up of severe COPD  He was last seen by me in April 2021  Since then, he states he is doing well, his breathing is unchanged  He did not have his screening CT scan completed which was ordered at his last visit  He liked the Trelegy, found it helpful  No hospital or ER visits since he was last here  Uses Albuterol nebulizer and Ipratropium 4 times per day  Finds them helpful  Able to ambulate up flight of stairs but winded at top  Estimates that he can walk a block slowly       Smoking 3-4 cigars daily    Crohn's disease is stable, good appetite  Here with his daughter  Review of Systems  Positive as above and negative otherwise    Historical Information   Past Medical History:   Diagnosis Date    Anxiety     Asthma     Cardiac disease     Cervical stenosis (uterine cervix)     Chest pain     Chronic kidney disease     Colitis     Colon polyps     COPD (chronic obstructive pulmonary disease) (HCC)     2L @ HS and PRN    Coronary artery disease     Diverticulitis     Esophageal reflux     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HCC)     Hyperlipidemia     Hypertension     IBD (inflammatory bowel disease)     [facility]     Old myocardial infarction     Perforation of colon (HCC)     Type 2 diabetes, diet controlled ([facility]     Ulcerative colitis ([facility] )      Past Surgical History:   Procedure Laterality Date    ANGIOPLASTY      APPENDECTOMY      COLON SURGERY      COLONOSCOPY N/A 10/24/2016    Procedure: COLONOSCOPY;  Surgeon: Wagner Terrazas MD;  [facility]; Service:     CORONARY ANGIOPLASTY WITH STENT PLACEMENT      ESOPHAGOGASTRODUODENOSCOPY N/A 10/24/2016    Procedure: ESOPHAGOGASTRODUODENOSCOPY (EGD); Surgeon: Wagner Terrazas MD;  [facility]; Service:     EXPLORATORY LAPAROTOMY W/ BOWEL RESECTION N/A 5/22/2018    Procedure: EXPLORATORY LAPAROTOMY, ILIOCOLECTOMY, ILIOCOLONIC ANASTAMOSIS, LOOP ILIOSTOMY, REPAIR OF SEROSAL TEAR, EXTENSIVE LYSIS OF ADHESIONS, WOUND VAC PLACEMENT;  Surgeon: Wagner Terrazas MD;  [facility];  Service: Colorectal    HEMICOLECTOMY      ILEOSTOMY CLOSURE N/A 10/5/2018    Procedure: Stephanie Geronimog;  Surgeon: Wagner Terrazas MD;  [facility];  Service: Colorectal    OTHER SURGICAL HISTORY      stent indications acute myocardial infarction    ND COLONOSCOPY FLX DX W/COLLJ SPEC WHEN PFRMD N/A 2/6/2018    Procedure: COLONOSCOPY;  Surgeon: Wagner Terrazas MD;  [facility];   Service: Colorectal    AK COLONOSCOPY FLX DX W/COLLJ SPEC WHEN PFRMD N/A 10/4/2018    Procedure: COLONOSCOPY;  Surgeon: Belén Villagran MD;  [facility];   Service: Colorectal    TONSILLECTOMY       Family History   Problem Relation Age of Onset    Coronary artery disease Father     Crohn's disease Father     Stroke Father     Diabetes Family          Meds/Allergies     Current Outpatient Medications:     albuterol (2 5 mg/3 mL) 0 083 % nebulizer solution, Take 1 vial (2 5 mg total) by nebulization every 4 (four) hours as needed for wheezing or shortness of breath, Disp: 75 mL, Rfl: 7    albuterol (2 5 mg/3 mL) 0 083 % nebulizer solution, TAKE 1 VIAL (2 5 MG TOTAL) BY NEBULIZATION 4 (FOUR) TIMES A DAY, Disp: 300 mL, Rfl: 5    albuterol (PROVENTIL HFA,VENTOLIN HFA) 90 mcg/act inhaler, TAKE 2 PUFFS BY MOUTH EVERY 6 HOURS AS NEEDED FOR WHEEZE, Disp: 18 g, Rfl: 5    Ascorbic Acid (vitamin C) 100 MG tablet, Take 100 mg by mouth daily, Disp: , Rfl:     aspirin 81 mg chewable tablet, Chew 1 tablet (81 mg total) daily, Disp: 90 tablet, Rfl: 0    atorvastatin (LIPITOR) 40 mg tablet, TAKE 1 TABLET BY MOUTH DAILY WITH DINNER, Disp: 90 tablet, Rfl: 3    folic acid (FOLVITE) 1 mg tablet, TAKE 1 TABLET BY MOUTH EVERY DAY, Disp: 30 tablet, Rfl: 0    gabapentin (NEURONTIN) 300 mg capsule, Take 1 capsule (300 mg total) by mouth 3 (three) times a day, Disp: 90 capsule, Rfl: 3    ipratropium (ATROVENT) 0 02 % nebulizer solution, Take 2 5 mL (0 5 mg total) by nebulization 4 (four) times a day, Disp: 250 mL, Rfl: 0    Multiple Vitamins-Minerals (Centrum Silver 50+Men) TABS, Take by mouth, Disp: , Rfl:     sulfaSALAzine (AZULFIDINE) 500 mg tablet, Take 1 tablet (500 mg total) by mouth 4 (four) times a day, Disp: 360 tablet, Rfl: 0    Nebulizers (AERONEB GO NEBULIZER HANDSET) MISC, by Does not apply route 2 (two) times a day as needed (wheezing) (Patient not taking: No sig reported), Disp: 1 each, Rfl: 0  Allergies Allergen Reactions    Medical Tape     Other      Brown cloth band aids       Latex Rash       Vitals: Blood pressure 120/68, pulse (!) 110, temperature 98 3 °F (36 8 °C), temperature source Tympanic, height 5' 6" (1 676 m), weight 78 9 kg (174 lb), SpO2 92 %  Body mass index is 28 08 kg/m²  Oxygen Therapy  SpO2: 92 %  Oxygen Therapy: None (Room air)      Physical Exam  GEN: WDWN, nad, comfortable  HEENT: NCAT, EOMI  CVS: Regular, no m/r/g  LUNGS: CTA b/l  ABD: soft  EXT: No c/c/e  NEURO: No focal deficits  MS: Moving all extremities  SKIN: warm, dry  PSYCH: calm, cooperative      Labs: I have personally reviewed pertinent lab results  Lab Results   Component Value Date    WBC 11 83 (H) 10/15/2018    HGB 7 8 (L) 10/15/2018    HCT 25 5 (L) 10/15/2018    MCV 87 10/15/2018     10/15/2018     Lab Results   Component Value Date    GLUCOSE 92 07/08/2018    CALCIUM 7 7 (L) 10/15/2018     05/29/2015    K 3 9 10/15/2018    CO2 23 10/15/2018     10/15/2018    BUN 7 10/15/2018    CREATININE 1 16 10/15/2018     No results found for: IGE  Lab Results   Component Value Date    ALT 21 08/30/2018    AST 10 08/30/2018    ALKPHOS 133 (H) 08/30/2018    BILITOT 0 58 05/21/2015       Labs per my interpretation show normal creatinine, anemia    Pulmonary function testing:  Spirometry 2/20: Very severe obstruction with an FEV1 0 68 L, 21% predicted    DIANELYS [facility]

## 2022-05-19 DIAGNOSIS — J44.9 STAGE 4 VERY SEVERE COPD BY GOLD CLASSIFICATION (HCC): ICD-10-CM

## 2022-06-05 DIAGNOSIS — J44.9 STAGE 4 VERY SEVERE COPD BY GOLD CLASSIFICATION (HCC): ICD-10-CM

## 2022-06-06 RX ORDER — ALBUTEROL SULFATE 2.5 MG/3ML
2.5 SOLUTION RESPIRATORY (INHALATION) 4 TIMES DAILY
Qty: 300 ML | Refills: 5 | Status: SHIPPED | OUTPATIENT
Start: 2022-06-06

## 2022-06-16 DIAGNOSIS — J44.9 STAGE 4 VERY SEVERE COPD BY GOLD CLASSIFICATION (HCC): ICD-10-CM

## 2022-06-28 DIAGNOSIS — M54.12 CERVICAL RADICULOPATHY: ICD-10-CM

## 2022-06-29 RX ORDER — GABAPENTIN 300 MG/1
CAPSULE ORAL
Qty: 90 CAPSULE | Refills: 3 | Status: SHIPPED | OUTPATIENT
Start: 2022-06-29

## 2022-07-12 DIAGNOSIS — J44.9 CHRONIC OBSTRUCTIVE PULMONARY DISEASE, UNSPECIFIED COPD TYPE (HCC): ICD-10-CM

## 2022-07-12 RX ORDER — ALBUTEROL SULFATE 90 UG/1
AEROSOL, METERED RESPIRATORY (INHALATION)
Qty: 18 G | Refills: 5 | Status: SHIPPED | OUTPATIENT
Start: 2022-07-12

## 2022-08-09 ENCOUNTER — PATIENT OUTREACH (OUTPATIENT)
Dept: INTERNAL MEDICINE CLINIC | Facility: CLINIC | Age: 62
End: 2022-08-09

## 2022-08-09 NOTE — PROGRESS NOTES
Outpatient Care Management Note:    Received message from patient that he has been having "eye problems" and reports the sunlight hurts his eyes and his eyes get watery  Patient requesting an appointment but unsure if we participate with his insurance  I called patient and no answer  Per chart review patient has [facility]  I requested he call the customer/member service number on the back of his card and see if our office is participating with his insurance  I also made him aware to check if he has vision coverage and to see what eye doctor's he can go to in the area  I suggested seeing an ophthalmologist  I did make patient aware we are booking into October at this time and suggested he schedule directly with an eye doctor to be further evaluated  I did suggest routine follow up with PCP office once he verifies if we participate with his insurance  I left my contact number and PCP office number if needed

## 2022-11-28 DIAGNOSIS — J44.9 STAGE 4 VERY SEVERE COPD BY GOLD CLASSIFICATION (HCC): ICD-10-CM

## 2022-11-28 RX ORDER — ALBUTEROL SULFATE 2.5 MG/3ML
2.5 SOLUTION RESPIRATORY (INHALATION) 4 TIMES DAILY
Qty: 300 ML | Refills: 5 | Status: SHIPPED | OUTPATIENT
Start: 2022-11-28

## 2022-12-20 DIAGNOSIS — J44.9 CHRONIC OBSTRUCTIVE PULMONARY DISEASE, UNSPECIFIED COPD TYPE (HCC): ICD-10-CM

## 2022-12-21 RX ORDER — ALBUTEROL SULFATE 90 UG/1
AEROSOL, METERED RESPIRATORY (INHALATION)
Qty: 18 G | Refills: 5 | Status: SHIPPED | OUTPATIENT
Start: 2022-12-21

## 2022-12-28 ENCOUNTER — TELEPHONE (OUTPATIENT)
Dept: PULMONOLOGY | Facility: CLINIC | Age: 62
End: 2022-12-28

## 2022-12-28 DIAGNOSIS — J44.9 STAGE 4 VERY SEVERE COPD BY GOLD CLASSIFICATION (HCC): ICD-10-CM

## 2023-03-25 DIAGNOSIS — J44.9 STAGE 4 VERY SEVERE COPD BY GOLD CLASSIFICATION (HCC): ICD-10-CM

## 2023-03-27 RX ORDER — ALBUTEROL SULFATE 2.5 MG/3ML
2.5 SOLUTION RESPIRATORY (INHALATION) 4 TIMES DAILY
Qty: 270 ML | Refills: 5 | Status: SHIPPED | OUTPATIENT
Start: 2023-03-27

## 2023-03-29 NOTE — TELEPHONE ENCOUNTER
Spoke with patient and is aware script sent but no refills (please see my patient outreach note) [FreeTextEntry1] : Continue meds\par Return as needed

## 2023-04-25 ENCOUNTER — APPOINTMENT (EMERGENCY)
Dept: RADIOLOGY | Facility: HOSPITAL | Age: 63
End: 2023-04-25

## 2023-04-25 ENCOUNTER — APPOINTMENT (INPATIENT)
Dept: RADIOLOGY | Facility: HOSPITAL | Age: 63
End: 2023-04-25

## 2023-04-25 ENCOUNTER — HOSPITAL ENCOUNTER (INPATIENT)
Facility: HOSPITAL | Age: 63
LOS: 11 days | Discharge: HOME HEALTH | End: 2023-05-06
Attending: EMERGENCY MEDICINE | Admitting: INTERNAL MEDICINE

## 2023-04-25 DIAGNOSIS — K70.30 ALCOHOLIC CIRRHOSIS OF LIVER WITHOUT ASCITES (HCC): ICD-10-CM

## 2023-04-25 DIAGNOSIS — R65.21 SEPTIC SHOCK (HCC): ICD-10-CM

## 2023-04-25 DIAGNOSIS — J96.90 RESPIRATORY FAILURE (HCC): Primary | ICD-10-CM

## 2023-04-25 DIAGNOSIS — K50.918 CROHN'S DISEASE WITH OTHER COMPLICATION, UNSPECIFIED GASTROINTESTINAL TRACT LOCATION (HCC): ICD-10-CM

## 2023-04-25 DIAGNOSIS — A41.9 SEPTIC SHOCK (HCC): ICD-10-CM

## 2023-04-25 DIAGNOSIS — J44.9 STAGE 4 VERY SEVERE COPD BY GOLD CLASSIFICATION (HCC): ICD-10-CM

## 2023-04-25 DIAGNOSIS — R74.01 TRANSAMINITIS: ICD-10-CM

## 2023-04-25 DIAGNOSIS — J96.01 ACUTE RESPIRATORY FAILURE WITH HYPOXIA (HCC): ICD-10-CM

## 2023-04-25 DIAGNOSIS — E72.20 HYPERAMMONEMIA (HCC): ICD-10-CM

## 2023-04-25 DIAGNOSIS — R79.89 LACTATE BLOOD INCREASE: ICD-10-CM

## 2023-04-25 DIAGNOSIS — J44.9 CHRONIC OBSTRUCTIVE PULMONARY DISEASE, UNSPECIFIED COPD TYPE (HCC): ICD-10-CM

## 2023-04-25 DIAGNOSIS — N17.9 AKI (ACUTE KIDNEY INJURY) (HCC): ICD-10-CM

## 2023-04-25 DIAGNOSIS — E87.29 HIGH ANION GAP METABOLIC ACIDOSIS: ICD-10-CM

## 2023-04-25 DIAGNOSIS — R09.02 HYPOXIA: ICD-10-CM

## 2023-04-25 DIAGNOSIS — E03.9 HYPOTHYROID: ICD-10-CM

## 2023-04-25 DIAGNOSIS — E87.20 ACIDOSIS: ICD-10-CM

## 2023-04-25 DIAGNOSIS — J44.9 END STAGE COPD (HCC): ICD-10-CM

## 2023-04-25 DIAGNOSIS — I95.0 IDIOPATHIC HYPOTENSION: ICD-10-CM

## 2023-04-25 PROBLEM — D53.9 MACROCYTIC ANEMIA: Status: ACTIVE | Noted: 2018-05-24

## 2023-04-25 PROBLEM — R57.9 SHOCK (HCC): Status: ACTIVE | Noted: 2023-04-25

## 2023-04-25 PROBLEM — R73.9 HYPERGLYCEMIA: Status: ACTIVE | Noted: 2023-04-25

## 2023-04-25 PROBLEM — R94.5 ABNORMAL LIVER FUNCTION: Status: ACTIVE | Noted: 2023-04-25

## 2023-04-25 LAB
2HR DELTA HS TROPONIN: -2 NG/L
4HR DELTA HS TROPONIN: 2 NG/L
ABO GROUP BLD: NORMAL
ALBUMIN SERPL BCP-MCNC: 1.5 G/DL (ref 3.5–5)
ALBUMIN SERPL BCP-MCNC: 2.1 G/DL (ref 3.5–5)
ALP SERPL-CCNC: 168 U/L (ref 46–116)
ALP SERPL-CCNC: 263 U/L (ref 46–116)
ALT SERPL W P-5'-P-CCNC: 47 U/L (ref 12–78)
ALT SERPL W P-5'-P-CCNC: 66 U/L (ref 12–78)
AMMONIA PLAS-SCNC: 82 UMOL/L (ref 11–35)
AMYLASE SERPL-CCNC: 155 IU/L (ref 25–115)
ANION GAP SERPL CALCULATED.3IONS-SCNC: 16 MMOL/L (ref 4–13)
ANION GAP SERPL CALCULATED.3IONS-SCNC: 18 MMOL/L (ref 4–13)
ANION GAP SERPL CALCULATED.3IONS-SCNC: 28 MMOL/L (ref 4–13)
APAP SERPL-MCNC: <2 UG/ML (ref 10–20)
APTT PPP: 28 SECONDS (ref 23–37)
AST SERPL W P-5'-P-CCNC: 147 U/L (ref 5–45)
AST SERPL W P-5'-P-CCNC: 204 U/L (ref 5–45)
ATRIAL RATE: 136 BPM
ATRIAL RATE: 141 BPM
BACTERIA UR QL AUTO: ABNORMAL /HPF
BASE EX.OXY STD BLDV CALC-SCNC: 78.6 % (ref 60–80)
BASE EXCESS BLDA CALC-SCNC: -13.5 MMOL/L
BASE EXCESS BLDA CALC-SCNC: -7.9 MMOL/L
BASE EXCESS BLDV CALC-SCNC: -18.1 MMOL/L
BASOPHILS # BLD AUTO: 0.06 THOUSANDS/ÂΜL (ref 0–0.1)
BASOPHILS NFR BLD AUTO: 0 % (ref 0–1)
BILIRUB SERPL-MCNC: 1.1 MG/DL (ref 0.2–1)
BILIRUB SERPL-MCNC: 1.28 MG/DL (ref 0.2–1)
BILIRUB UR QL STRIP: ABNORMAL
BLD GP AB SCN SERPL QL: NEGATIVE
BUN SERPL-MCNC: 27 MG/DL (ref 5–25)
BUN SERPL-MCNC: 33 MG/DL (ref 5–25)
BUN SERPL-MCNC: 34 MG/DL (ref 5–25)
CALCIUM ALBUM COR SERPL-MCNC: 10 MG/DL (ref 8.3–10.1)
CALCIUM ALBUM COR SERPL-MCNC: 7.4 MG/DL (ref 8.3–10.1)
CALCIUM SERPL-MCNC: 5.4 MG/DL (ref 8.3–10.1)
CALCIUM SERPL-MCNC: 7.3 MG/DL (ref 8.3–10.1)
CALCIUM SERPL-MCNC: 8.5 MG/DL (ref 8.3–10.1)
CARDIAC TROPONIN I PNL SERPL HS: 33 NG/L
CARDIAC TROPONIN I PNL SERPL HS: 35 NG/L
CARDIAC TROPONIN I PNL SERPL HS: 37 NG/L
CHLORIDE SERPL-SCNC: 106 MMOL/L (ref 96–108)
CHLORIDE SERPL-SCNC: 86 MMOL/L (ref 96–108)
CHLORIDE SERPL-SCNC: 93 MMOL/L (ref 96–108)
CK SERPL-CCNC: 465 U/L (ref 39–308)
CLARITY UR: ABNORMAL
CO2 SERPL-SCNC: 11 MMOL/L (ref 21–32)
CO2 SERPL-SCNC: 11 MMOL/L (ref 21–32)
CO2 SERPL-SCNC: 15 MMOL/L (ref 21–32)
COLOR UR: YELLOW
CREAT SERPL-MCNC: 2.03 MG/DL (ref 0.6–1.3)
CREAT SERPL-MCNC: 2.83 MG/DL (ref 0.6–1.3)
CREAT SERPL-MCNC: 2.87 MG/DL (ref 0.6–1.3)
EOSINOPHIL # BLD AUTO: 0.05 THOUSAND/ÂΜL (ref 0–0.61)
EOSINOPHIL NFR BLD AUTO: 0 % (ref 0–6)
ERYTHROCYTE [DISTWIDTH] IN BLOOD BY AUTOMATED COUNT: 13.9 % (ref 11.6–15.1)
ETHANOL SERPL-MCNC: 45 MG/DL (ref 0–3)
GFR SERPL CREATININE-BSD FRML MDRD: 22 ML/MIN/1.73SQ M
GFR SERPL CREATININE-BSD FRML MDRD: 23 ML/MIN/1.73SQ M
GFR SERPL CREATININE-BSD FRML MDRD: 34 ML/MIN/1.73SQ M
GLUCOSE SERPL-MCNC: 118 MG/DL (ref 65–140)
GLUCOSE SERPL-MCNC: 149 MG/DL (ref 65–140)
GLUCOSE SERPL-MCNC: 154 MG/DL (ref 65–140)
GLUCOSE SERPL-MCNC: 226 MG/DL (ref 65–140)
GLUCOSE UR STRIP-MCNC: NEGATIVE MG/DL
GRAN CASTS #/AREA URNS LPF: ABNORMAL /[LPF]
HBV CORE AB SER QL: NORMAL
HBV CORE IGM SER QL: NORMAL
HBV SURFACE AG SER QL: NORMAL
HCO3 BLDA-SCNC: 12.4 MMOL/L (ref 22–28)
HCO3 BLDA-SCNC: 18.1 MMOL/L (ref 22–28)
HCO3 BLDV-SCNC: 11.2 MMOL/L (ref 24–30)
HCT VFR BLD AUTO: 33.7 % (ref 36.5–49.3)
HCT VFR BLD AUTO: 37.7 % (ref 36.5–49.3)
HCV AB SER QL: NORMAL
HGB BLD-MCNC: 11.4 G/DL (ref 12–17)
HGB BLD-MCNC: 12.1 G/DL (ref 12–17)
HGB UR QL STRIP.AUTO: ABNORMAL
HYALINE CASTS #/AREA URNS LPF: ABNORMAL /LPF
IMM GRANULOCYTES # BLD AUTO: 0.33 THOUSAND/UL (ref 0–0.2)
IMM GRANULOCYTES NFR BLD AUTO: 2 % (ref 0–2)
INR PPP: 1.21 (ref 0.84–1.19)
KETONES UR STRIP-MCNC: NEGATIVE MG/DL
LACTATE SERPL-SCNC: 10.2 MMOL/L (ref 0.5–2)
LACTATE SERPL-SCNC: 11.7 MMOL/L (ref 0.5–2)
LACTATE SERPL-SCNC: 12.9 MMOL/L (ref 0.5–2)
LACTATE SERPL-SCNC: 20.3 MMOL/L (ref 0.5–2)
LEUKOCYTE ESTERASE UR QL STRIP: ABNORMAL
LIPASE SERPL-CCNC: 2090 U/L (ref 73–393)
LYMPHOCYTES # BLD AUTO: 1.2 THOUSANDS/ÂΜL (ref 0.6–4.47)
LYMPHOCYTES NFR BLD AUTO: 6 % (ref 14–44)
MCH RBC QN AUTO: 33.1 PG (ref 26.8–34.3)
MCHC RBC AUTO-ENTMCNC: 32.1 G/DL (ref 31.4–37.4)
MCV RBC AUTO: 103 FL (ref 82–98)
MONOCYTES # BLD AUTO: 1.35 THOUSAND/ÂΜL (ref 0.17–1.22)
MONOCYTES NFR BLD AUTO: 7 % (ref 4–12)
MUCOUS THREADS UR QL AUTO: ABNORMAL
NEUTROPHILS # BLD AUTO: 17.58 THOUSANDS/ÂΜL (ref 1.85–7.62)
NEUTS SEG NFR BLD AUTO: 85 % (ref 43–75)
NITRITE UR QL STRIP: NEGATIVE
NON-SQ EPI CELLS URNS QL MICRO: ABNORMAL /HPF
NRBC BLD AUTO-RTO: 1 /100 WBCS
NT-PROBNP SERPL-MCNC: 3297 PG/ML
O2 CT BLDA-SCNC: 17.2 ML/DL (ref 16–23)
O2 CT BLDA-SCNC: 17.6 ML/DL (ref 16–23)
O2 CT BLDV-SCNC: 14.3 ML/DL
OXYHGB MFR BLDA: 99.2 % (ref 94–97)
OXYHGB MFR BLDA: 99.3 % (ref 94–97)
P AXIS: 97 DEGREES
PCO2 BLDA: 29.2 MM HG (ref 36–44)
PCO2 BLDA: 38.9 MM HG (ref 36–44)
PCO2 BLDV: 39.5 MM HG (ref 42–50)
PH BLDA: 7.25 [PH] (ref 7.35–7.45)
PH BLDA: 7.29 [PH] (ref 7.35–7.45)
PH BLDV: 7.07 [PH] (ref 7.3–7.4)
PH UR STRIP.AUTO: 5.5 [PH]
PLATELET # BLD AUTO: 155 THOUSANDS/UL (ref 149–390)
PMV BLD AUTO: 9.5 FL (ref 8.9–12.7)
PO2 BLDA: 296.1 MM HG (ref 75–129)
PO2 BLDA: 364.1 MM HG (ref 75–129)
PO2 BLDV: 66.5 MM HG (ref 35–45)
POTASSIUM SERPL-SCNC: 3.8 MMOL/L (ref 3.5–5.3)
POTASSIUM SERPL-SCNC: 4.7 MMOL/L (ref 3.5–5.3)
POTASSIUM SERPL-SCNC: 5.1 MMOL/L (ref 3.5–5.3)
PR INTERVAL: 160 MS
PROCALCITONIN SERPL-MCNC: 1.73 NG/ML
PROT SERPL-MCNC: 4.2 G/DL (ref 6.4–8.4)
PROT SERPL-MCNC: 6.4 G/DL (ref 6.4–8.4)
PROT UR STRIP-MCNC: ABNORMAL MG/DL
PROTHROMBIN TIME: 15.5 SECONDS (ref 11.6–14.5)
QRS AXIS: 89 DEGREES
QRS AXIS: 91 DEGREES
QRSD INTERVAL: 80 MS
QRSD INTERVAL: 90 MS
QT INTERVAL: 288 MS
QT INTERVAL: 318 MS
QTC INTERVAL: 436 MS
QTC INTERVAL: 467 MS
RBC # BLD AUTO: 3.66 MILLION/UL (ref 3.88–5.62)
RBC #/AREA URNS AUTO: ABNORMAL /HPF
RH BLD: POSITIVE
SALICYLATES SERPL-MCNC: <3 MG/DL (ref 3–20)
SODIUM SERPL-SCNC: 125 MMOL/L (ref 135–147)
SODIUM SERPL-SCNC: 126 MMOL/L (ref 135–147)
SODIUM SERPL-SCNC: 133 MMOL/L (ref 135–147)
SP GR UR STRIP.AUTO: 1.04 (ref 1–1.03)
SPECIMEN EXPIRATION DATE: NORMAL
SPECIMEN SOURCE: ABNORMAL
SPECIMEN SOURCE: ABNORMAL
T WAVE AXIS: -22 DEGREES
T WAVE AXIS: 107 DEGREES
UROBILINOGEN UR STRIP-ACNC: 2 MG/DL
VENTRICULAR RATE: 130 BPM
VENTRICULAR RATE: 138 BPM
VIT B12 SERPL-MCNC: 682 PG/ML (ref 100–900)
WBC # BLD AUTO: 20.57 THOUSAND/UL (ref 4.31–10.16)
WBC #/AREA URNS AUTO: ABNORMAL /HPF

## 2023-04-25 PROCEDURE — 02HV33Z INSERTION OF INFUSION DEVICE INTO SUPERIOR VENA CAVA, PERCUTANEOUS APPROACH: ICD-10-PCS | Performed by: EMERGENCY MEDICINE

## 2023-04-25 RX ORDER — LACTULOSE 20 G/30ML
20 SOLUTION ORAL 2 TIMES DAILY
Status: DISCONTINUED | OUTPATIENT
Start: 2023-04-25 | End: 2023-04-26

## 2023-04-25 RX ORDER — NOREPINEPHRINE BITARTRATE 1 MG/ML
INJECTION, SOLUTION INTRAVENOUS
Status: COMPLETED
Start: 2023-04-25 | End: 2023-04-25

## 2023-04-25 RX ORDER — METRONIDAZOLE 500 MG/100ML
500 INJECTION, SOLUTION INTRAVENOUS EVERY 8 HOURS
Status: DISCONTINUED | OUTPATIENT
Start: 2023-04-25 | End: 2023-04-28

## 2023-04-25 RX ORDER — PANTOPRAZOLE SODIUM 40 MG/10ML
40 INJECTION, POWDER, LYOPHILIZED, FOR SOLUTION INTRAVENOUS EVERY 12 HOURS SCHEDULED
Status: DISCONTINUED | OUTPATIENT
Start: 2023-04-25 | End: 2023-04-29

## 2023-04-25 RX ORDER — ALBUTEROL SULFATE 90 UG/1
2 AEROSOL, METERED RESPIRATORY (INHALATION) EVERY 6 HOURS PRN
Status: DISCONTINUED | OUTPATIENT
Start: 2023-04-25 | End: 2023-04-25

## 2023-04-25 RX ORDER — LEVALBUTEROL 1.25 MG/.5ML
1.25 SOLUTION, CONCENTRATE RESPIRATORY (INHALATION)
Status: DISCONTINUED | OUTPATIENT
Start: 2023-04-25 | End: 2023-04-29

## 2023-04-25 RX ORDER — IPRATROPIUM BROMIDE AND ALBUTEROL SULFATE 2.5; .5 MG/3ML; MG/3ML
3 SOLUTION RESPIRATORY (INHALATION) ONCE
Status: COMPLETED | OUTPATIENT
Start: 2023-04-25 | End: 2023-04-25

## 2023-04-25 RX ORDER — SODIUM CHLORIDE FOR INHALATION 0.9 %
3 VIAL, NEBULIZER (ML) INHALATION
Status: DISCONTINUED | OUTPATIENT
Start: 2023-04-25 | End: 2023-04-25

## 2023-04-25 RX ORDER — IPRATROPIUM BROMIDE AND ALBUTEROL SULFATE 2.5; .5 MG/3ML; MG/3ML
3 SOLUTION RESPIRATORY (INHALATION)
Status: DISCONTINUED | OUTPATIENT
Start: 2023-04-25 | End: 2023-04-25

## 2023-04-25 RX ORDER — DEXTROSE AND SODIUM CHLORIDE 5; .45 G/100ML; G/100ML
75 INJECTION, SOLUTION INTRAVENOUS CONTINUOUS
Status: DISCONTINUED | OUTPATIENT
Start: 2023-04-25 | End: 2023-04-26

## 2023-04-25 RX ORDER — ALBUTEROL SULFATE 2.5 MG/3ML
2.5 SOLUTION RESPIRATORY (INHALATION) EVERY 4 HOURS PRN
Status: DISCONTINUED | OUTPATIENT
Start: 2023-04-25 | End: 2023-05-06 | Stop reason: HOSPADM

## 2023-04-25 RX ORDER — INSULIN LISPRO 100 [IU]/ML
1-6 INJECTION, SOLUTION INTRAVENOUS; SUBCUTANEOUS EVERY 6 HOURS SCHEDULED
Status: DISCONTINUED | OUTPATIENT
Start: 2023-04-25 | End: 2023-04-27

## 2023-04-25 RX ORDER — ALBUMIN, HUMAN INJ 5% 5 %
12.5 SOLUTION INTRAVENOUS ONCE
Status: COMPLETED | OUTPATIENT
Start: 2023-04-25 | End: 2023-04-25

## 2023-04-25 RX ORDER — ATORVASTATIN CALCIUM 40 MG/1
40 TABLET, FILM COATED ORAL
Status: DISCONTINUED | OUTPATIENT
Start: 2023-04-25 | End: 2023-04-25

## 2023-04-25 RX ORDER — PHENOBARBITAL SODIUM 65 MG/ML
2 INJECTION INTRAMUSCULAR ONCE
Status: COMPLETED | OUTPATIENT
Start: 2023-04-25 | End: 2023-04-25

## 2023-04-25 RX ORDER — EPINEPHRINE 0.1 MG/ML
SYRINGE (ML) INJECTION
Status: DISPENSED
Start: 2023-04-25 | End: 2023-04-26

## 2023-04-25 RX ORDER — CALCIUM GLUCONATE 20 MG/ML
2 INJECTION, SOLUTION INTRAVENOUS ONCE
Status: COMPLETED | OUTPATIENT
Start: 2023-04-25 | End: 2023-04-25

## 2023-04-25 RX ORDER — DEXMEDETOMIDINE HYDROCHLORIDE 4 UG/ML
.1-.7 INJECTION, SOLUTION INTRAVENOUS
Status: DISCONTINUED | OUTPATIENT
Start: 2023-04-25 | End: 2023-04-26

## 2023-04-25 RX ORDER — LANOLIN ALCOHOL/MO/W.PET/CERES
100 CREAM (GRAM) TOPICAL DAILY
Status: DISCONTINUED | OUTPATIENT
Start: 2023-04-25 | End: 2023-04-25

## 2023-04-25 RX ORDER — LIDOCAINE HYDROCHLORIDE 10 MG/ML
INJECTION, SOLUTION EPIDURAL; INFILTRATION; INTRACAUDAL; PERINEURAL
Status: COMPLETED
Start: 2023-04-25 | End: 2023-04-25

## 2023-04-25 RX ORDER — LIDOCAINE HYDROCHLORIDE 10 MG/ML
5 INJECTION, SOLUTION EPIDURAL; INFILTRATION; INTRACAUDAL; PERINEURAL ONCE
Status: COMPLETED | OUTPATIENT
Start: 2023-04-25 | End: 2023-04-25

## 2023-04-25 RX ORDER — CHLORHEXIDINE GLUCONATE 0.12 MG/ML
15 RINSE ORAL EVERY 12 HOURS SCHEDULED
Status: DISCONTINUED | OUTPATIENT
Start: 2023-04-25 | End: 2023-05-01

## 2023-04-25 RX ORDER — VANCOMYCIN HYDROCHLORIDE 1 G/200ML
15 INJECTION, SOLUTION INTRAVENOUS DAILY PRN
Status: DISCONTINUED | OUTPATIENT
Start: 2023-04-26 | End: 2023-04-28

## 2023-04-25 RX ORDER — SODIUM CHLORIDE, SODIUM GLUCONATE, SODIUM ACETATE, POTASSIUM CHLORIDE, MAGNESIUM CHLORIDE, SODIUM PHOSPHATE, DIBASIC, AND POTASSIUM PHOSPHATE .53; .5; .37; .037; .03; .012; .00082 G/100ML; G/100ML; G/100ML; G/100ML; G/100ML; G/100ML; G/100ML
75 INJECTION, SOLUTION INTRAVENOUS CONTINUOUS
Status: DISCONTINUED | OUTPATIENT
Start: 2023-04-25 | End: 2023-04-25

## 2023-04-25 RX ORDER — FOLIC ACID 1 MG/1
1000 TABLET ORAL DAILY
Status: DISCONTINUED | OUTPATIENT
Start: 2023-04-25 | End: 2023-04-30

## 2023-04-25 RX ADMIN — SODIUM CHLORIDE, SODIUM GLUCONATE, SODIUM ACETATE, POTASSIUM CHLORIDE, MAGNESIUM CHLORIDE, SODIUM PHOSPHATE, DIBASIC, AND POTASSIUM PHOSPHATE 75 ML/HR: .53; .5; .37; .037; .03; .012; .00082 INJECTION, SOLUTION INTRAVENOUS at 18:27

## 2023-04-25 RX ADMIN — PANTOPRAZOLE SODIUM 40 MG: 40 INJECTION, POWDER, FOR SOLUTION INTRAVENOUS at 21:50

## 2023-04-25 RX ADMIN — PHENOBARBITAL SODIUM 158 MG: 65 INJECTION INTRAMUSCULAR at 17:47

## 2023-04-25 RX ADMIN — ALBUMIN (HUMAN) 12.5 G: 12.5 INJECTION, SOLUTION INTRAVENOUS at 21:14

## 2023-04-25 RX ADMIN — VANCOMYCIN HYDROCHLORIDE 2000 MG: 1 INJECTION, POWDER, LYOPHILIZED, FOR SOLUTION INTRAVENOUS at 13:43

## 2023-04-25 RX ADMIN — FOLIC ACID 1000 MCG: 1 TABLET ORAL at 18:23

## 2023-04-25 RX ADMIN — FOMEPIZOLE 1200 MG: 1 INJECTION, SOLUTION INTRAVENOUS at 22:15

## 2023-04-25 RX ADMIN — SODIUM CHLORIDE 1000 ML: 0.9 INJECTION, SOLUTION INTRAVENOUS at 11:00

## 2023-04-25 RX ADMIN — METRONIDAZOLE 500 MG: 500 INJECTION, SOLUTION INTRAVENOUS at 18:24

## 2023-04-25 RX ADMIN — LIDOCAINE HYDROCHLORIDE 5 ML: 10 INJECTION, SOLUTION EPIDURAL; INFILTRATION; INTRACAUDAL; PERINEURAL at 13:15

## 2023-04-25 RX ADMIN — LEVALBUTEROL HYDROCHLORIDE 1.25 MG: 1.25 SOLUTION, CONCENTRATE RESPIRATORY (INHALATION) at 19:02

## 2023-04-25 RX ADMIN — IPRATROPIUM BROMIDE 0.5 MG: 0.5 SOLUTION RESPIRATORY (INHALATION) at 19:02

## 2023-04-25 RX ADMIN — Medication 3 ML: at 15:28

## 2023-04-25 RX ADMIN — METRONIDAZOLE 500 MG: 500 INJECTION, SOLUTION INTRAVENOUS at 23:04

## 2023-04-25 RX ADMIN — CEFEPIME 1000 MG: 1 INJECTION, POWDER, FOR SOLUTION INTRAMUSCULAR; INTRAVENOUS at 23:00

## 2023-04-25 RX ADMIN — CALCIUM GLUCONATE 2 G: 20 INJECTION, SOLUTION INTRAVENOUS at 21:45

## 2023-04-25 RX ADMIN — IPRATROPIUM BROMIDE AND ALBUTEROL SULFATE 3 ML: .5; 3 SOLUTION RESPIRATORY (INHALATION) at 10:59

## 2023-04-25 RX ADMIN — LACTULOSE 20 G: 20 SOLUTION ORAL at 21:55

## 2023-04-25 RX ADMIN — NOREPINEPHRINE BITARTRATE 10 MCG/MIN: 1 INJECTION, SOLUTION, CONCENTRATE INTRAVENOUS at 22:03

## 2023-04-25 RX ADMIN — DEXMEDETOMIDINE HYDROCHLORIDE 0.4 MCG/KG/HR: 400 INJECTION INTRAVENOUS at 17:32

## 2023-04-25 RX ADMIN — CEFEPIME 2000 MG: 2 INJECTION, POWDER, FOR SOLUTION INTRAVENOUS at 12:55

## 2023-04-25 RX ADMIN — THIAMINE HYDROCHLORIDE 500 MG: 100 INJECTION, SOLUTION INTRAMUSCULAR; INTRAVENOUS at 17:36

## 2023-04-25 RX ADMIN — NOREPINEPHRINE BITARTRATE 15 MCG/MIN: 1 INJECTION, SOLUTION, CONCENTRATE INTRAVENOUS at 17:32

## 2023-04-25 RX ADMIN — IOHEXOL 100 ML: 350 INJECTION, SOLUTION INTRAVENOUS at 12:33

## 2023-04-25 RX ADMIN — CHLORHEXIDINE GLUCONATE 0.12% ORAL RINSE 15 ML: 1.2 LIQUID ORAL at 21:01

## 2023-04-25 RX ADMIN — NOREPINEPHRINE BITARTRATE 5 MCG/MIN: 1 INJECTION, SOLUTION, CONCENTRATE INTRAVENOUS at 11:20

## 2023-04-25 RX ADMIN — DEXTROSE AND SODIUM CHLORIDE 75 ML/HR: 5; .45 INJECTION, SOLUTION INTRAVENOUS at 19:38

## 2023-04-25 RX ADMIN — ALBUTEROL SULFATE 2.5 MG: 2.5 SOLUTION RESPIRATORY (INHALATION) at 21:57

## 2023-04-25 RX ADMIN — LEVALBUTEROL HYDROCHLORIDE 1.25 MG: 1.25 SOLUTION, CONCENTRATE RESPIRATORY (INHALATION) at 15:28

## 2023-04-25 RX ADMIN — VASOPRESSIN 0.04 UNITS/MIN: 20 INJECTION INTRAVENOUS at 22:00

## 2023-04-25 NOTE — ED ATTENDING ATTESTATION
4/25/2023  Parth CHAPMAN July, DO, saw and evaluated the patient  I have discussed the patient with the resident/non-physician practitioner and agree with the resident's/non-physician practitioner's findings, Plan of Care, and MDM as documented in the resident's/non-physician practitioner's note, except where noted  All available labs and Radiology studies were reviewed  I was present for key portions of any procedure(s) performed by the resident/non-physician practitioner and I was immediately available to provide assistance  At this point I agree with the current assessment done in the Emergency Department  I have conducted an independent evaluation of this patient a history and physical is as follows:    82-year-old male presents for respiratory distress  Patient called EMS due to worsening dyspnea and increased work of breathing  Patient is a very poor historian and is in respiratory distress thus history is difficult to obtain  Reportedly has been short of breath over the past day  EMS was called unsure if by the patient nonetheless they arrived to find the patient and extremis in a very bedraggled state  His house was a mess  On arrival the patient is tachypneic with prolonged expiratory phase just grunting can speak a few words at a time  He has a large hernia in his abdomen  He does not complain of any pain initially and then states he does have abdominal pain and chest pain  He then answers the same questions with the opposite answer a few minutes later  Does not show any signs of trauma or skin breakdown he is filthy and may be covered in feces    Reviewed past records, the patient has not been here for a few years  He has a history of severe alcohol abuse as well as COPD  Still continues to smoke    History of partial colon resection secondary to Crohn disease with ileostomy with subsequent reversal    Patient is markedly hypotensive, tachycardic, oxygen saturation 90% on room air but very significant increased work of breathing  Large distended abdomen does not appear tympanitic  No tenderness  Lungs are clear without wheezing    Impression: Severe respiratory distress, confusion,  Differential is broad but includes COPD exacerbation with hypercapnia, sepsis, cardiogenic shock, mesenteric ischemia, acute kidney injury    ED Course     Patient required vasopressors for hypotension  Undifferentiated shock  Lactic is 20  This raises suspicion for ischemia of the gut, alcohol ketoacidosis, sepsis  Discussed with surgery, CTA dissection chest abdomen pelvis interpreted by me does not show any mesenteric ischemia or clear sign or evidence of source of infection  Bedside ultrasound shows no pericardial effusion there is a dilated RV diffuse hypokinesis  The 30ml/kg fluid bolus was not given to the patient despite hypotension and/or significantly elevated lactate of ? 4 and/or presence of septic shock due to: Heart Failure  The patient will be administered 2L of crystalloid fluid instead  Orders for this have been placed in Epic  The patient may receive additional colloid or crystalloid fluids thereafter based on clinical condition       Eitan Cordero DO    Critical Care Time  CriticalCare Time    Date/Time: 4/27/2023 5:26 AM  Performed by: Eitan Cordero DO  Authorized by: Eitan Cordero DO     Critical care provider statement:     Critical care time (minutes):  80    Critical care time was exclusive of:  Separately billable procedures and treating other patients and teaching time    Critical care was necessary to treat or prevent imminent or life-threatening deterioration of the following conditions:  Shock    Critical care was time spent personally by me on the following activities:  Blood draw for specimens, obtaining history from patient or surrogate, re-evaluation of patient's condition, review of old charts, evaluation of patient's response to treatment, examination of patient, ordering and review of laboratory studies, ordering and performing treatments and interventions, development of treatment plan with patient or surrogate and ordering and review of radiographic studies

## 2023-04-25 NOTE — PROGRESS NOTES
Bren De Leon is a 63 y o  male who is currently ordered Vancomycin IV with management by the Pharmacy Consult service  Relevant clinical data and objective / subjective history reviewed  Vancomycin Assessment:  Indication and Goal AUC/Trough: Bacteremia (goal -600, trough >10)  Clinical Status: worsening  Micro:   Pending  Renal Function:  SCr: 24 7 mg/dL  CrCl: 24 7 mL/min  Renal replacement: Not on dialysis  Days of Therapy: 1  Current Dose: 2000 mg IV once   Vancomycin Plan:  New Dosing: 15 mg/kg IV daily prn vanco level < 15 mcg/mL     Next Level: 4/26/23 @ 0600  Renal Function Monitoring: Daily BMP and [place] will continue to follow closely for s/sx of nephrotoxicity, infusion reactions and appropriateness of therapy  BMP and CBC will be ordered per protocol  We will continue to follow the patient’s culture results and clinical progress daily      Meng Arndt, Pharmacist

## 2023-04-25 NOTE — ED PROVIDER NOTES
"Chief Complaint   Patient presents with   • Shortness of Breath     Pt c o increased sob  History of Present Illness and Review of Systems   This is a 63 y o  male with PMH significant for severe COPD, Alcohol Use Disorder, HTN, Crohn's disease s/p resection coming in today with complaint of shortness of breath  The patient arrived in respiratory distress therefore history is limited  EMS was reportedly called due to his worsening shortness of breath over the last day and found him at home disheveled  When they arrived, he did have significantly increased work of breathing  They suspected he was in a COPD exacerbation given his severe history and attempted DuoNeb on route however no relief of his symptoms  He reports chest pain as well  Denies any episodes of syncope  It is unclear how long the symptoms have been ongoing  The patient is intermittently confused, keeps repeatedly asking for \"something to drink\"     Chart review remarkable for severe COPD  Also has been treated for diabetes and CAD in the past   No evidence of recent hospitalizations or surgeries  He is a known heavy drinker  Remainder of history limited  Past Medical, Past Surgical History:    has a past medical history of Anxiety, Asthma, Cardiac disease, Cervical stenosis (uterine cervix), Chest pain, Chronic kidney disease, Colitis, Colon polyps, COPD (chronic obstructive pulmonary disease) ([facility] ), Coronary artery disease, Diverticulitis, Esophageal reflux, 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[facility] ), Hyperlipidemia, Hypertension, IBD (inflammatory bowel disease), Myocardial infarction [facility]), Old myocardial infarction, Per[facility] colon ([facility] ), Type 2 diabetes, diet controlled ([facility] ), and Ulcerative colitis ([facility] )  has a past surgical history that includes Colon surgery; Colonoscopy (N/A, 10/24/2016); Esophagogastroduodenoscopy (N/A, 10/24/2016); Coronary angioplasty with stent; Tonsillectomy;  Angioplasty; pr colonoscopy flx dx w/collj spec when " pfrmd (N/A, 2/6/2018); Exploratory laparotomy w/ bowel resection (N/A, 5/22/2018); Hemicolectomy; Other surgical history; Appendectomy; pr colonoscopy flx dx w/collj spec when pfrmd (N/A, 10/4/2018); and Ileostomy closure (N/A, 10/5/2018)  Allergies: Allergies   Allergen Reactions   • Medical Tape    • Other      Brown cloth band aids      • Latex Rash       Social and Family History:     Social History     Substance and Sexual Activity   Alcohol Use Yes     Social History     Tobacco Use   Smoking Status Every Day   • Packs/day: 3 00   • Years: 40 00   • Pack years: 120 00   • Types: Cigars, Cigarettes   • Start date: 1978   Smokeless Tobacco Never   Tobacco Comments    4-5cigars per day     Social History     Substance and Sexual Activity   Drug Use No       Physical Examination     Vitals:    04/25/23 1600 04/25/23 1611 04/25/23 1700 04/25/23 1729   BP: 107/57  112/72 (!) 116/48   BP Location:   Right arm    Pulse: (!) 128  (!) 124 (!) 124   Resp:   (!) 30    Temp:   99 7 °F (37 6 °C) 98 6 °F (37 °C)   SpO2: (!) 88% 92% 100% 100%   Weight:         Physical Exam  Vitals and nursing note reviewed  Constitutional:       General: He is in acute distress  Appearance: He is well-developed  He is ill-appearing  HENT:      Head: Normocephalic and atraumatic  Eyes:      Conjunctiva/sclera: Conjunctivae normal       Pupils: Pupils are equal, round, and reactive to light  Cardiovascular:      Rate and Rhythm: Regular rhythm  Tachycardia present  Heart sounds: No murmur heard  Pulmonary:      Effort: Pulmonary effort is normal  Tachypnea present  No respiratory distress  Breath sounds: Examination of the right-lower field reveals decreased breath sounds  Examination of the left-lower field reveals decreased breath sounds  Decreased breath sounds present  No wheezing, rhonchi or rales  Abdominal:      Palpations: Abdomen is soft  Tenderness: There is no abdominal tenderness  Musculoskeletal:         General: No swelling  Normal range of motion  Cervical back: Normal range of motion and neck supple  Right lower leg: No edema  Left lower leg: No edema  Skin:     General: Skin is warm and dry  Capillary Refill: Capillary refill takes less than 2 seconds  Coloration: Skin is pale  Neurological:      General: No focal deficit present  Mental Status: He is alert  He is disoriented  Psychiatric:         Mood and Affect: Mood normal             Risk Stratification Tools                Orders Placed This Encounter   Procedures   • Blood culture #1   • Blood culture #2   • Urine culture   • MRSA culture   • CTA dissection protocol chest abdomen pelvis w wo contrast   • CT head without contrast   • XR abdomen 1 view kub   • XR abdomen 1 view kub   • US right upper quadrant   • CBC and differential   • Comprehensive metabolic panel   • Lactic acid   • Procalcitonin   • Protime-INR   • APTT   • Blood gas, Venous   • Ammonia   • HS Troponin 0hr (reflex protocol)   • HS Troponin I 2hr   • HS Troponin I 4hr   • Lactic acid 2 Hours   • Ethanol   • Salicylate level   • Acetaminophen level-If concentration is detectable, please discuss with medical  on call     • CK   • Hemoglobin and hematocrit, blood   • Lactic acid, plasma (w/reflex if result > 2 0)   • Blood gas, arterial   • Urinalysis with microscopic   • Toxicology screen, urine   • Lipase   • Amylase   • Lactic acid 2 Hours   • Phosphorus   • Magnesium   • CBC and differential   • Protime-INR   • NT-BNP [facility] campus only   • Basic metabolic panel   • Hemoglobin A1C   • Vitamin B12   • Comprehensive metabolic panel   • Chronic Hepatitis Panel   • Blood gas, venous   • Ammonia   • Folate   • Ethylene glycol   • Methanol   • Diet NPO   • Insert peripheral IV   • Cardio-Pulmonary Monitoring (Critical Care & Step Down Only)   • Vital Signs   • Up with assistance   • Nasal Antiseptic Swab   • Turn patient   • Daily weights   • I/O   • CAM (ICU) Assessment   • Nursing dysphagia Screen   • Neuro checks   • Apply SCD or Foot pumps   • Follow CIWA-Ar Nursing Protocol   • Continuous Pulse Oximetry   • Notify physician to discontinue CIWA protocol if CIWA score remains 0-7 for 72 consecutive hours   • Elevate head of bed 30 degrees   • Insulin Subcutaneous Notify Physician   • Insulin Subcutaneous Instruction   • Hypoglycemia Protocol   • Fingerstick Glucose (POCT) Every 6 hours (Primarily for patients who are NPO)   • Fingerstick Glucose (POCT)   • Level 1-Full Code: all life saving measures are indicated   • Consult to Case Management   • Inpatient consult to Toxicology   • Pharmacy general consult   • Occupational Therapy Evaluation and Treatment   • Physical Therapy  Evaluation and Treatment   • High Flow Nasal Cannula   • Respiratory Protocol   • Bipap   • ECG 12 lead   • ECG 12 lead   • ECG 12 lead   • Echo complete w/ contrast if indicated   • Type and screen   • Insert arterial line   • INPATIENT ADMISSION   • Fall precautions   • Aspiration precautions   • Seizure precautions       Labs:     Labs Reviewed   CBC AND DIFFERENTIAL - Abnormal       Result Value Ref Range Status    WBC 20 57 (*) 4 31 - 10 16 Thousand/uL Final    RBC 3 66 (*) 3 88 - 5 62 Million/uL Final    Hemoglobin 12 1  12 0 - 17 0 g/dL Final    Hematocrit 37 7  36 5 - 49 3 % Final     (*) 82 - 98 fL Final    MCH 33 1  26 8 - 34 3 pg Final    MCHC 32 1  31 4 - 37 4 g/dL Final    RDW 13 9  11 6 - 15 1 % Final    MPV 9 5  8 9 - 12 7 fL Final    Platelets 802  285 - 390 Thousands/uL Final    nRBC 1  /100 WBCs Final    Neutrophils Relative 85 (*) 43 - 75 % Final    Immat GRANS % 2  0 - 2 % Final    Lymphocytes Relative 6 (*) 14 - 44 % Final    Monocytes Relative 7  4 - 12 % Final    Eosinophils Relative 0  0 - 6 % Final    Basophils Relative 0  0 - 1 % Final    Neutrophils Absolute 17 58 (*) 1 85 - 7 62 Thousands/µL Final    Immature Grans Absolute 0 33 (*) 0 00 - 0 20 Thousand/uL Final    Lymphocytes Absolute 1 20  0 60 - 4 47 Thousands/µL Final    Monocytes Absolute 1 35 (*) 0 17 - 1 22 Thousand/µL Final    Eosinophils Absolute 0 05  0 00 - 0 61 Thousand/µL Final    Basophils Absolute 0 06  0 00 - 0 10 Thousands/µL Final   COMPREHENSIVE METABOLIC PANEL - Abnormal    Sodium 125 (*) 135 - 147 mmol/L Final    Potassium 4 7  3 5 - 5 3 mmol/L Final    Chloride 86 (*) 96 - 108 mmol/L Final    CO2 11 (*) 21 - 32 mmol/L Final    ANION GAP 28 (*) 4 - 13 mmol/L Final    BUN 33 (*) 5 - 25 mg/dL Final    Creatinine 2 87 (*) 0 60 - 1 30 mg/dL Final    Comment: Standardized to IDMS reference method    Glucose 226 (*) 65 - 140 mg/dL Final    Comment: If the patient is fasting, the ADA then defines impaired fasting glucose as > 100 mg/dL and diabetes as > or equal to 123 mg/dL  Specimen collection should occur prior to Sulfasalazine administration due to the potential for falsely depressed results  Specimen collection should occur prior to Sulfapyridine administration due to the potential for falsely elevated results  Calcium 8 5  8 3 - 10 1 mg/dL Final    Corrected Calcium 10 0  8 3 - 10 1 mg/dL Final     (*) 5 - 45 U/L Final    Comment: Specimen collection should occur prior to Sulfasalazine administration due to the potential for falsely depressed results  ALT 66  12 - 78 U/L Final    Comment: Specimen collection should occur prior to Sulfasalazine and/or Sulfapyridine administration due to the potential for falsely depressed results  Alkaline Phosphatase 263 (*) 46 - 116 U/L Final    Total Protein 6 4  6 4 - 8 4 g/dL Final    Albumin 2 1 (*) 3 5 - 5 0 g/dL Final    Total Bilirubin 1 10 (*) 0 20 - 1 00 mg/dL Final    Comment: Use of this assay is not recommended for patients undergoing treatment with eltrombopag due to the potential for falsely elevated results      eGFR 22  ml/min/1 73sq m Final    Narrative:     National Kid[employer] Foundation guidelines for Chronic Kidney Disease (CKD):   •  Stage 1 with normal or high GFR (GFR > 90 mL/min/1 73 square meters)  •  Stage 2 Mild CKD (GFR = 60-89 mL/min/1 73 square meters)  •  Stage 3A Moderate CKD (GFR = 45-59 mL/min/1 73 square meters)  •  Stage 3B Moderate CKD (GFR = 30-44 mL/min/1 73 square meters)  •  Stage 4 Severe CKD (GFR = 15-29 mL/min/1 73 square meters)  •  Stage 5 End Stage CKD (GFR <15 mL/min/1 73 square meters)  Note: GFR calculation is accurate only with a steady state creatinine   LACTIC ACID, PLASMA (W/REFLEX IF RESULT > 2 0) - Abnormal    LACTIC ACID 20 3 (*) 0 5 - 2 0 mmol/L Final    Narrative:     Result may be elevated if tourniquet was used during collection  PROCALCITONIN TEST - Abnormal    Procalcitonin 1 73 (*) <=0 25 ng/ml Final    Comment: Suspected Lower Respiratory Tract Infection (LRTI):  - LESS than or EQUAL to 0 25 ng/mL:   low likelihood for bacterial LRTI; antibiotics DISCOURAGED   - GREATER than 0 25 ng/mL:   increased likelihood for bacterial LRTI; antibiotics ENCOURAGED  Suspected Sepsis:  - Strongly consider initiating antibiotics in ALL UNSTABLE patients  - LESS than or EQUAL to 0 5 ng/mL:   low likelihood for bacterial sepsis; antibiotics DISCOURAGED   - GREATER than 0 5 ng/mL:   increased likelihood for bacterial sepsis; antibiotics ENCOURAGED   - GREATER than 2 ng/mL:   high risk for severe sepsis / septic shock; antibiotics strongly ENCOURAGED  Decisions on antibiotic use should not be based solely on Procalcitonin (PCT) levels  If PCT is low but uncertainty exists with stopping antibiotics, repeat PCT in 6-24 hours to confirm the low level   If antibiotics are administered (regardless if initial PCT was high or low), repeat PCT every 1-2 days to consider early antibiotic cessation (when GREATER than 80% decrease from the peak OR when PCT drops below designated cutoffs, whichever comes first), so long as the infection is NOT one that typically requires prolonged treatment durations (e g , bone/joint infections, endocarditis, Staph  aureus bacteremia)  Situations of FALSE-POSITIVE Procalcitonin values:  1) Newborns < 67 hours old  2) Massive stress from severe trauma / burns, major surgery, acute pancreatitis, cardiogenic / hemorrhagic shock, sickle cell crisis, or other organ perfusion abnormalities  3) Malaria and some Candidal infections  4) Treatment with agents that stimulate cytokines (e g , OKT3, anti-lymphocyte globulins, alemtuzumab, IL-2, granulocyte transfusion [NOT GCSFs])  5) Chronic renal disease causes elevated baseline levels (consider GREATER than 0 75 ng/mL as an abnormal cut-off); initiating HD/CRRT may cause transient decreases  6) Paraneoplastic syndromes from medullary thyroid or SCLC, some forms of vasculitis, and acute xcvbj-gj-teje disease    Situations of FALSE-NEGATIVE Procalcitonin values:  1) Too early in clinical course for PCT to have reached its peak (may repeat in 6-24 hours to confirm low level)  2) Localized infection WITHOUT systemic (SIRS / sepsis) response (e g , an abscess, osteomyelitis, cystitis)  3) Mycobacteria (e g , Tuberculosis, MAC)  4) Cystic fibrosis exacerbations     PROTIME-INR - Abnormal    Protime 15 5 (*) 11 6 - 14 5 seconds Final    INR 1 21 (*) 0 84 - 1 19 Final   BLOOD GAS, VENOUS - Abnormal    pH, Ravindra 7 072 (*) 7 300 - 7 400 Final    pCO2, Ravindra 39 5 (*) 42 0 - 50 0 mm Hg Final    pO2, Ravindra 66 5 (*) 35 0 - 45 0 mm Hg Final    HCO3, Ravindra 11 2 (*) 24 - 30 mmol/L Final    Base Excess, Ravindra -18 1  mmol/L Final    O2 Content, Ravindra 14 3  ml/dL Final    O2 HGB, VENOUS 78 6  60 0 - 80 0 % Final   AMMONIA - Abnormal    Ammonia 82 (*) 11 - 35 umol/L Final    Comment: Specimen collection should occur prior to Sulfasalazine administration due to the potential for falsely elevated results  Specimen collection should occur prior to Sulfapyridine administration due to the potential for falsely depressed results  "  MEDICAL ALCOHOL - Abnormal    Ethanol Lvl 45 (*) 0 - 3 mg/dL Final   SALICYLATE LEVEL - Abnormal    Salicylate Lvl <3 (*) 3 - 20 mg/dL Final   ACETAMINOPHEN LEVEL - Abnormal    Acetaminophen Level <2 (*) 10 - 20 ug/mL Final   HEMOGLOBIN AND HEMATOCRIT, BLOOD - Abnormal    Hemoglobin 11 4 (*) 12 0 - 17 0 g/dL Final    Hematocrit 33 7 (*) 36 5 - 49 3 % Final   APTT - Normal    PTT 28  23 - 37 seconds Final    Comment: Therapeutic Heparin Range =  60-90 seconds   HS TROPONIN I 0HR - Normal    hs TnI 0hr 35  \"Refer to ACS Flowchart\"- see link ng/L Final    Comment:                                              Initial (time 0) result  If >=50 ng/L, Myocardial injury suggested ;  Type of myocardial injury and treatment strategy  to be determined  If 5-49 ng/L, a delta result at 2 hours and or 4 hours will be needed to further evaluate  If <4 ng/L, and chest pain has been >3 hours since onset, patient may qualify for discharge based on the HEART score in the ED  If <5 ng/L and <3hours since onset of chest pain, a delta result at 2 hours will be needed to further evaluate  HS Troponin 99th Percentile URL of a Health Population=12 ng/L with a 95% Confidence Interval of 8-18 ng/L  Second Troponin (time 2 hours)  If calculated delta >= 20 ng/L,  Myocardial injury suggested ; Type of myocardial injury and treatment strategy to be determined  If 5-49 ng/L and the calculated delta is 5-19 ng/L, consult medical service for evaluation  Continue evaluation for ischemia on ecg and other possible etiology and repeat hs troponin at 4 hours  If delta is <5 ng/L at 2 hours, consider discharge based on risk stratification via the HEART score (if in ED), or BARBER risk score in IP/Observation      HS Troponin 99th Percentile URL of a Health Population=12 ng/L with a 95% Confidence Interval of 8-18 ng/L    HS TROPONIN I 2HR - Normal    hs TnI 2hr 33  \"Refer to ACS Flowchart\"- see link ng/L Final    Comment:                        " Initial (time 0) result  If >=50 ng/L, Myocardial injury suggested ;  Type of myocardial injury and treatment strategy  to be determined  If 5-49 ng/L, a delta result at 2 hours and or 4 hours will be needed to further evaluate  If <4 ng/L, and chest pain has been >3 hours since onset, patient may qualify for discharge based on the HEART score in the ED  If <5 ng/L and <3hours since onset of chest pain, a delta result at 2 hours will be needed to further evaluate  HS Troponin 99th Percentile URL of a Health Population=12 ng/L with a 95% Confidence Interval of 8-18 ng/L  Second Troponin (time 2 hours)  If calculated delta >= 20 ng/L,  Myocardial injury suggested ; Type of myocardial injury and treatment strategy to be determined  If 5-49 ng/L and the calculated delta is 5-19 ng/L, consult medical service for evaluation  Continue evaluation for ischemia on ecg and other possible etiology and repeat hs troponin at 4 hours  If delta is <5 ng/L at 2 hours, consider discharge based on risk stratification via the HEART score (if in ED), or BARBER risk score in IP/Observation  HS Troponin 99th Percentile URL of a Health Population=12 ng/L with a 95% Confidence Interval of 8-18 ng/L  Delta 2hr hsTnI -2  <20 ng/L Final   BLOOD CULTURE    Blood Culture Received in Microbiology Lab  Culture in Progress  Preliminary   BLOOD CULTURE    Blood Culture Received in Microbiology Lab  Culture in Progress  Preliminary   URINE CULTURE   HS TROPONIN I 4HR   CK   URINALYSIS WITH MICROSCOPIC   TOXICOLOGY SCREEN, URINE   TYPE AND SCREEN    ABO Grouping A   Final    Rh Factor Positive   Final    Antibody Screen Negative   Final    Specimen Expiration Date [id number]   Final   COMA PANEL    Narrative: The following orders were created for panel order Coma Panel    Procedure                               Abnormality         Status                     ---------                               ----------- ------                     Ethanol[[id number]]                      Abnormal            Final result               Salicylate AYILO[[id number]]             Abnormal            Final result               Acetaminophen level-If c  Sharpe Leesa Sharpemargaret Brownum [[id number]]  Abnormal            Final result                 Please view results for these tests on the individual orders  Imaging:     CT head without contrast   Final Result by Elveria Homans, MD (04/25 1622)      No acute intracranial abnormality  Workstation performed: [id number]         CTA dissection protocol chest abdomen pelvis w wo contrast   Final Result by Malia Morales MD (04/25 9320)      1  No aortic dissection, intramural hematoma, or aortic aneurysm  Patent mesenteric vessels  2  Circumferential mural thickening of the esophagus with an air-fluid level, which may relate to esophagitis and esophageal dysmotility  3  Hepatomegaly with severe hepatic steatosis  I personally discussed this study with Dr Brian Crain on 4/25/2023 1:15 PM at 4/25/2023 1:36 PM       Workstation performed: [id number]         XR abdomen 1 view kub    (Results Pending)   XR abdomen 1 view kub    (Results Pending)   US right upper quadrant    (Results Pending)          Procedures   Arterial Line    Date/Time: 4/25/2023 1:57 PM  Performed by: Elisa Berger MD  Authorized by: Elisa Berger MD     Patient location:  ED  Other Assisting Provider: No    Consent:     Consent obtained:  Verbal and emergent situation    Consent given by:  Patient    Risks discussed:  Bleeding, ischemia and repeat procedure  Universal protocol:     Patient identity confirmed:  Verbally with patient and arm band  Indications:     Indications: hemodynamic monitoring    Pre-procedure details:     Skin preparation:  Chlorhexidine    Preparation: Patient was prepped and draped in sterile fashion    Anesthesia (see MAR for exact dosages):      Anesthesia method: Local infiltration    Local anesthetic:  Lidocaine 1% w/o epi  Procedure details:     Location / Tip of Catheter:  Radial    Laterality:  Right    Needle gauge:  20 G    Placement technique:  Percutaneous    Number of attempts:  1    Successful placement: yes      Transducer: waveform confirmed    Post-procedure details:     Post-procedure:  Biopatch applied and sutured    CMS:  Normal    Patient tolerance of procedure: Tolerated well, no immediate complications          MDM:   Medical Decision Making  Jayce  is a 63 y o  who presents with complaints of respiratory distress    Vital signs are markable for tachypnea, tachycardia, hypotension, physical exam shows patient is disheveled, ill-appearing, notably in respiratory distress with tachypnea lungs are clear to auscultation, no evidence of wheezing or crackles or rails, he does have a large abdominal hernia, no peritoneal signs, his distal pulses are difficult to palpate, extremities are cool    Ddx/Plan: Overall highly concerning presentation for dissection versus PE versus delirium tremens versus ACS versus pneumothorax versus profound dehydration vs sepsis vs other  Work-up way broad include dissection study, sepsis panel, cardiac labs, VBG  The patient was immediately placed on HFNC given his work of breathing  For a period of time on arrival we were unable to appreciate a blood pressure reading and manually it was determined that he was quite hypotensive presumably in the sBP 50mmhg range  Following this he was immediately given 1L IVF  His status was tenuous given his soft blood pressures, believed he was not stable enough to go to the CT scan prior to resuscitation  As a result, added on more IVF and initated levophed  However he still had an undetectable auto-cuff BP  His work of breathing remained the same despite HFNC  Ultimately the decision was made to take him to scan despite his tenuous status   Push dose epi was ordered at the bedside and additionalyl RSI meds were also ordered and ready in case he did rapidly decompensate while undergoing CT imaging  We avoided intubation if at all possible because believed we would not be able to match his minute ventilation and this may likely cause decompensation  Fortunately, he was able to be taken to scan without issue  Workup was notable for profound acidosis, elevated lactate to 20, DEBBY, evidence of strain pattern on EKG, hyperammonemia, hyponatremia, and other metabolic derangements  Unclear the etiology of this  Initially suspected ischemic bowel given his lactate elevation and hx of bowel resection  Acute care surgery and SICU were called to bedside  The decision was nearly made to take the pt to the OR however once he began having hematemesis s/p NG tube placement, it was determined his symptoms were likely not related to ischemic bowel  Differential still includes sepsis vs toxicology (toxic alcohol vs ESTEFANIA vs other?) vs shock of other etiology  Given his blood pressure measuring difficulties, opted to place an A-line which occurred without issue  Given his critical status, recommended admission to MICU  Discussed the patient's case with the MICU service who agreed to admit the patient for further care and evaluation  The patient was also stable throughout their ED course and suffered from no acute changes and had no further questions or concerns from the ED team's standpoint  Amount and/or Complexity of Data Reviewed  Labs: ordered  Decision-making details documented in ED Course  Radiology: ordered  Risk  Prescription drug management  Decision regarding hospitalization  ED Course as of 04/25/23 1740   Tue Apr 25, 2023   1108 WBC(!): 20 57   1109 pH, Ravindra(!): 7 072   1126 Pts blood pressure initially undetectable, manually sbp 40   Empirically started levo 5mcg, and IVF and currently perfusing well recent SBP 90   1155 eGFR: 22   1155 Sodium(!): 125 1155 Creatinine(!): 2 87   1155 Calling rads   7903 Surgery coming to eval   1244 Added on coma panel   1406 ICU at bedside, admitting      Final Dispo   Final Diagnosis:  1  Respiratory failure ([facility] )    2  Hypoxia    3  DEBBY (acute kidney injury) ([facility] )    4  High anion gap metabolic acidosis    5  Lactate blood increase    6  Transaminitis    7  Acidosis    8  Alcoholic cirrhosis of liver without ascites ([facility] )    9  [facility]      Time reflects when diagnosis was documented in both MDM as applicable and the Disposition within this note     Time User Action Codes Description Comment    4/25/2023  2:06 PM Leretha Smart Add [J96 90] Respiratory failure ([facility] )     4/25/2023  2:06 PM Leretha Smart Add [R09 02] Hypoxia     4/25/2023  2:07 PM Leretha Smart Add [N17 9] DEBBY (acute kidney injury) ([facility] )     4/25/2023  2:07 PM Leretha Smart Add [E87 29] High anion gap metabolic acidosis     5/02/2444  2:07 PM Leretha Smart Add [R79 89] Lactate blood increase     4/25/2023  2:07 PM Leretha Smart Add [R74 01] Transaminitis     4/25/2023  2:07 PM Leretha Smart Add [E87 20] Acidosis     4/25/2023  3:16 PM PrenVera durand Add [B08 97] Alcoholic cirrhosis of liver without ascites ([facility] )     4/25/2023  4:27 PM Annemarie Cobb Add [A41 9,  R65 21] Septic shock [facility])       ED Disposition     ED Disposition   Admit    Condition   Stable    Date/Time   Tue Apr 25, 2023  2:09 PM    Comment   Case was discussed with ICU and the patient's admission status was agreed to be Admission Status: inpatient status to the service of Dr Damian Sams              Follow-up Information    None       Medications   norepinephrine (LEVOPHED) 4 mg (STANDARD CONCENTRATION) IV in sodium chloride 0 9% 250 mL (15 mcg/min Intravenous New Bag 4/25/23 7780)   EPINEPHrine (ADRENALIN) 0 1 mg/mL injection **ADS Override Pull** (  Not Given 4/25/23 1215)   levalbuterol (XOPENEX) inhalation solution 1 25 mg (1 25 mg Nebulization Given 4/25/23 6038) albuterol inhalation solution 2 5 mg (has no administration in time range)   chlorhexidine (PERIDEX) 0 12 % oral rinse 15 mL (has no administration in time range)   folic acid (FOLVITE) tablet 1,000 mcg (has no administration in time range)   multivitamin stress formula tablet 1 tablet (has no administration in time range)   thiamine (VITAMIN B1) 500 mg in sodium chloride 0 9 % 50 mL IVPB (500 mg Intravenous New Bag 4/25/23 1937)   dexmedeTOMIDine (Precedex) 400 mcg in sodium chloride 0 9% 100 mL (0 4 mcg/kg/hr × 78 9 kg Intravenous New Bag 4/25/23 173)   ipratropium (ATROVENT) 0 02 % inhalation solution 0 5 mg (has no administration in time range)   lactulose retention enema 200 g (has no administration in time range)   cefepime (MAXIPIME) 1,000 mg in dextrose 5 % 50 mL IVPB (has no administration in time range)   metroNIDAZOLE (FLAGYL) IVPB (premix) 500 mg 100 mL (has no administration in time range)   vancomycin (VANCOCIN) 1,250 mg in sodium chloride 0 9 % 250 mL IVPB (has no administration in time range)   insulin lispro (HumaLOG) 100 units/mL subcutaneous injection 1-6 Units (has no administration in time range)   fomepizole (ANTIZOL) 1,200 mg in sodium chloride 0 9 % 100 mL IVPB (has no administration in time range)     Followed by   fomepizole (ANTIZOL) 800 mg in sodium chloride 0 9 % 100 mL IVPB (has no administration in time range)     Followed by   fomepizole (ANTIZOL) 1,200 mg in sodium chloride 0 9 % 100 mL IVPB (has no administration in time range)   PHENobarbital injection 158 mg (has no administration in time range)   sodium chloride 0 9 % bolus 1,000 mL (0 mL Intravenous Stopped 4/25/23 1211)   ipratropium-albuterol (DUO-NEB) 0 5-2 5 mg/3 mL inhalation solution 3 mL (3 mL Nebulization Given 4/25/23 1059)   norepinephrine (LEVOPHED) 1 mg/mL injection **ADS Override Pull** (  Override Pull 4/25/23 1117)   vancomycin (VANCOCIN) 2,000 mg in sodium chloride 0 9 % 500 mL IVPB (2,000 mg Intravenous New "Bag 4/25/23 1343)   cefepime (MAXIPIME) 2,000 mg in dextrose 5 % 50 mL IVPB (0 mg Intravenous Stopped 4/25/23 1325)   iohexol (OMNIPAQUE) 350 MG/ML injection (SINGLE-DOSE) 100 mL (100 mL Intravenous Given 4/25/23 1233)   lidocaine (PF) (XYLOCAINE-MPF) 1 % injection 5 mL (5 mL Infiltration Given by Other 4/25/23 1315)       All details of the evaluation and treatment plan were made clear and additionally all questions and concerns were addressed while under my care  Portions of the record may have been created with voice recognition software  Occasional wrong word or \"sound a like\" substitutions may have occurred due to the inherent limitations of voice recognition software  Read the chart carefully and recognize, using context, where substitutions have occurred  The attending physician physically available and evaluated the above patient alongside myself          Dottie De Leon MD  04/25/23 2123    "

## 2023-04-25 NOTE — QUICK NOTE
I called the patient's daughter Canelo Panda to get more information and provide a medical update  She says that for the past couple of weeks he has not been eating well and falling over  He has been drinking 2 large bottles of Vivienne daily for the past 6 months  She says that his cat  6 months ago and that this was likely a trigger for his increased alcohol use  His last drink was today  prior to the ambulance picking him up  He is supposed to take medications at home but has not taken any for at least 3 months, but still uses his breathing treatment on a regular basis  He has not taken any other drugs or been using any other substances recently  He lives at home at home with his daughter Canelo Panda and her 2 sons  He is normally able to complete all ADL's like cooking, cleaning, and shopping  Medical update provided and all questions and concerns answered to satisfaction       Edra Angelucci, D O   PGY-2 Internal Medicine   [facility] L[facility]

## 2023-04-25 NOTE — CONSULTS
INTERPROFESSIONAL (PHONE) Carlos Eduardo 1978 Toxicology  Leann Case 63 y o  male MRN: [id number]  [facility] Encounter: [id number]      Reason for Consult / Principal Problem: Anion gap metabolic acidosis  Inpatient consult to Toxicology  Consult performed by: Audrey Kumar DO  Consult ordered by: Nathan Cruz DO        04/25/23      ASSESSMENT:  1  Anion Gap Metabolic Acidosis  2  Acute Kidney Injury  3  Shock, distributive vs hypovolemic  4  Hyponatremia  5  Hypochloremia  6  Hyperammonemia  7  Lactatemia  8  Respiratory failure        RECOMMENDATIONS:  The metabolic derangements are most likely a complication of chronic alcohol use manifesting as a severe AKA  Significant lactatemia can also be seen in this setting as the patient has  Obtaining a beta hydroxybutyrate can be considered as would typically also be elevated with AKA  I recommend dextrose containg fluids along with high dose thiamine 500mg IV every 8 hours for 3 days  With dextrose, thiamine and fluids I would expect to start to see some improvement in AKA fairly quickly on repeat labs within the next 6 hours or so  Although AKA is most likely, I cannot completely exclude the possibility of an alcohol substitute like ethylene glycol given the AGMA, DEBBY, lactatemia (false lactate elevations from glycolic acid, which is structurally similar to lactic acid have been described)  Therefore, I think it is reasonable to start fomepizole empirically and send toxic alcohol testing while we resuscitate and sort it out  The patient is a known chronic alcohol user  He has had DT's previously  Therefore, he is at high risk for development of withdrawal and DT's  I discussed the case with Dr Lolis Mcmillan   Currently, the patient is not manifesting clinical evidence of ETOH withdrawal  I agree with monitoring closely for its development and having a low threshold to treat with phenobarbital  Because of his tenuous respiratory status, doses of 130mg to 260mg at a time can be given  Med tox will follow  For further questions, please contact the medical  on call via [place] [employer] or throughl the [employer]  Service or [facility] [place]  Please see additional teaching note below:    Hx and PE limited by the dynamics of a phone consultation  I have not personally interviewed or evaluated the patient, but only advised based on the information provided to me  Primary provider is responsible for all clinical decisions  Pertinent history, physical exam and clinical findings and course discussed: Dari Ruiz is a 63y o  year old male who presents with SOB  The patient has a known h/o COPD, Crohn's, and severe AUD with a h/o ICU care for DT's with a prior hospitalization  The patient was found covered in feces with very unsanitary conditions at home  In the ED, he was found to have severe metabolic derangements with DEBBY, lactate >20, AGMA, etc  The patient was also noted to be in resp distress with increased work of breathing requiring BiPAP along with hypotension requiring vasopressor therapy  He was admitted to the MICU  Med tox was consulted for opinion on the [facility] and any additional management recommendations  Review of systems and physical exam not performed by me      Historical Information   Past Medical History:   Diagnosis Date   • Anxiety    • Asthma    • Cardiac disease    • Cervical stenosis (uterine cervix)    • Chest pain    • Chronic kidney disease    • Colitis    • Colon polyps    • COPD (chronic obstructive pulmonary disease) (HCC)     2L @ HS and PRN   • Coronary artery disease    • Diverticulitis    • Esophageal reflux    • 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HCC)    • Hyperlipidemia    • Hypertension    • IBD (inflammatory bowel disease)    • Myocardial infarction [facility])    • Old myocardial infarction    • Perforation of colon (HCC)    • Type 2 diabetes, diet controlled ([facility] )    • Ulcerative colitis ([facility] [address] ) Past Surgical History:   Procedure Laterality Date   • ANGIOPLASTY     • APPENDECTOMY     • COLON SURGERY     • COLONOSCOPY N/A 10/24/2016    Procedure: COLONOSCOPY;  Surgeon: Evens Washington MD;  Location: [facility]; Service:    • CORONARY ANGIOPLASTY WITH STENT PLACEMENT     • ESOPHAGOGASTRODUODENOSCOPY N/A 10/24/2016    Procedure: ESOPHAGOGASTRODUODENOSCOPY (EGD); Surgeon: Evens Washington MD;  Location: [facility]; Service:    • EXPLORATORY LAPAROTOMY W/ BOWEL RESECTION N/A 5/22/2018    Procedure: EXPLORATORY LAPAROTOMY, ILIOCOLECTOMY, ILIOCOLONIC ANASTAMOSIS, LOOP ILIOSTOMY, REPAIR OF SEROSAL TEAR, EXTENSIVE LYSIS OF ADHESIONS, WOUND VAC PLACEMENT;  Surgeon: Evens Washington MD;  Location: [facility];  Service: Colorectal   • HEMICOLECTOMY     • ILEOSTOMY CLOSURE N/A 10/5/2018    Procedure: Andrews Fabrizio;  Surgeon: Evens Washington MD;  [facility];  Service: Colorectal   • OTHER SURGICAL HISTORY      stent indications acute myocardial infarction   • [place] COLONOSCOPY FLX DX W/COLLJ SPEC WHEN PFRMD N/A 2/6/2018    Procedure: COLONOSCOPY;  Surgeon: Evens Washington MD;  [facility]: [facility]; Service: Colorectal   • [place] COLONOSCOPY FLX DX W/COLLJ SPEC WHEN PFRMD N/A 10/4/2018    Procedure: COLONOSCOPY;  Surgeon: Evens Washington MD;  [facility];   Service: Colorectal   • TONSILLECTOMY       Social History   Social History     Substance and Sexual Activity   Alcohol Use Yes     Social History     Substance and Sexual Activity   Drug Use No     Social History     Tobacco Use   Smoking Status Every Day   • Packs/day: 3 00   • Years: 40 00   • Pack years: 120 00   • Types: Cigars, Cigarettes   • Start date: 1978   Smokeless Tobacco Never   Tobacco Comments    4-5cigars per day     Family History   Problem Relation Age of Onset   • Coronary artery disease Father    • Crohn's disease Father    • Stroke Father    • Diabetes Family         Prior to Admission medications    Medication Sig Start Date End Date Taking? Authorizing Provider   albuterol (2 5 mg/3 mL) 0 083 % nebulizer solution Take 1 vial (2 5 mg total) by nebulization every 4 (four) hours as needed for wheezing or shortness of breath 12/22/20   Renetta Panda MD   albuterol (2 5 mg/3 mL) 0 083 % nebulizer solution TAKE 1 VIAL (2 5 MG TOTAL) BY NEBULIZATION 4 (FOUR) TIMES A DAY 3/27/23   Nat Jonse DO   albuterol (PROVENTIL HFA,VENTOLIN HFA) 90 mcg/act inhaler INHALE 2 PUFFS BY MOUTH EVERY 6 HOURS AS NEEDED FOR WHEEZING 12/21/22   Rufus Alpers Nocek, CRNP   Ascorbic Acid (vitamin C) 100 MG tablet Take 100 mg by mouth daily    Historical Provider, MD   aspirin 81 mg chewable tablet Chew 1 tablet (81 mg total) daily 7/23/20   Vinay Ding MD   atorvastatin (LIPITOR) 40 mg tablet TAKE 1 TABLET BY MOUTH DAILY WITH DINNER 7/28/21   Katelynn Ely DO   fluticasone-umeclidinium-vilanterol (Trelegy Ellipta) 100-62 5-25 MCG/INH inhaler Inhale 1 puff  in the morning  Rinse mouth after use    5/16/22 6/15/22  Nat Jones DO   folic acid (FOLVITE) 1 mg tablet TAKE 1 TABLET BY MOUTH EVERY DAY 1/5/20   Vinay Ding MD   gabapentin (NEURONTIN) 300 mg capsule TAKE 1 CAPSULE BY MOUTH THREE TIMES A DAY 6/29/22   Katelynn Ely DO   ipratropium (ATROVENT) 0 02 % nebulizer solution TAKE 2 5 ML (0 5 MG TOTAL) BY NEBULIZATION 4 (FOUR) TIMES A DAY 12/28/22 6/26/23  AMALIA Ordoñez   Multiple Vitamins-Minerals (Centrum Silver 50+Men) TABS Take by mouth    Historical Provider, MD   Nebulizers (AERONEB GO NEBULIZER HANDSET) [address] [facility] by Does not apply route 2 (two) times a day as needed (wheezing)  Patient not taking: No sig reported 6/8/18   Tonny Zambrano MD   sulfaSALAzine (AZULFIDINE) 500 mg tablet Take 1 tablet (500 mg total) by mouth 4 (four) times a day 10/19/21 5/16/22  Elise Duty, DO       Current Facility-Administered Medications   Medication Dose Route Frequency   • albuterol inhalation solution 2 5 mg  2 5 mg Nebulization Q4H PRN   • [START ON 4/26/2023] cefepime (MAXIPIME) 1,000 mg in dextrose 5 % 50 mL IVPB  1,000 mg Intravenous Q12H   • chlorhexidine (PERIDEX) 0 12 % oral rinse 15 mL  15 mL Mouth/Throat Q12H [address]   • dexmedeTOMIDine (Precedex) 400 mcg in sodium chloride 0 9% 100 mL  0 1-0 7 mcg/kg/hr Intravenous Titrated   • EPINEPHrine (ADRENALIN) 0 1 mg/mL injection **ADS Override Pull**       • folic acid (FOLVITE) tablet 1,000 mcg  1,000 mcg Oral Daily   • insulin lispro (HumaLOG) 100 units/mL subcutaneous injection 1-6 Units  1-6 Units Subcutaneous Q6H [address]   • ipratropium (ATROVENT) 0 02 % inhalation solution 0 5 mg  0 5 mg Nebulization TID   • lactulose retention enema 200 g  200 g Rectal BID   • levalbuterol (XOPENEX) inhalation solution 1 25 mg  1 25 mg Nebulization TID   • metroNIDAZOLE (FLAGYL) IVPB (premix) 500 mg 100 mL  500 mg Intravenous Q8H   • multivitamin stress formula tablet 1 tablet  1 tablet Oral Daily   • norepinephrine (LEVOPHED) 4 mg (STANDARD CONCENTRATION) IV in sodium chloride 0 9% 250 mL  1-30 mcg/min Intravenous Titrated   • thiamine (VITAMIN B1) 500 mg in sodium chloride 0 9 % 50 mL IVPB  500 mg Intravenous Daily   • vancomycin (VANCOCIN) 1,250 mg in sodium chloride 0 9 % 250 mL IVPB  15 mg/kg Intravenous Q12H       Allergies   Allergen Reactions   • Medical Tape    • Other      Brown cloth band aids      • Latex Rash       Objective       Intake/Output Summary (Last 24 hours) at 4/25/2023 1701  Last data filed at 4/25/2023 1357  Gross per 24 hour   Intake 1000 ml   Output 60 ml   Net 940 ml       Invasive Devices:   Peripheral IV 04/25/23 Left Antecubital (Active)       Peripheral IV 04/25/23 Right Antecubital (Active)   Site Assessment WDL; Clean;Dry; Intact 04/25/23 1113       NG/OG/Enteral Tube Nasogastric 18 Fr Center mouth (Active)   Placement Reverification X-ray 04/25/23 1443       Vitals   Vitals:    04/25/23 1115 04/25/23 1121 04/25/23 1300 04/25/23 1400   BP: 107/72 97/54 100/58   Pulse: (!) 130 (!) 134 (!) 122 (!) 138   Resp: (!) 42 (!) 38 (!) 39 (!) 42   Temp:             EKG, Pathology, and/or Other Studies: I have personally reviewed pertinent reports  Lab Results: I have personally reviewed pertinent reports        Labs:    Results from last 7 days   Lab Units 04/25/23  1346 04/25/23  1045   WBC Thousand/uL  --  20 57*   HEMOGLOBIN g/dL 11 4* 12 1   HEMATOCRIT % 33 7* 37 7   PLATELETS Thousands/uL  --  155   NEUTROS PCT %  --  85*   LYMPHS PCT %  --  6*   MONOS PCT %  --  7      Results from last 7 days   Lab Units 04/25/23  1045   SODIUM mmol/L 125*   POTASSIUM mmol/L 4 7   CHLORIDE mmol/L 86*   CO2 mmol/L 11*   BUN mg/dL 33*   CREATININE mg/dL 2 87*   CALCIUM mg/dL 8 5   ALK PHOS U/L 263*   ALT U/L 66   AST U/L 204*      Results from last 7 days   Lab Units 04/25/23  1045   INR  1 21*   PTT seconds 28     Results from last 7 days   Lab Units 04/25/23  1627 04/25/23  1430 04/25/23  1045   LACTIC ACID mmol/L 11 7* 12 9* 20 3*     0   Lab Value Date/Time    TROPONINI <0 02 08/07/2018 0042    TROPONINI <0 02 06/13/2018 1512    TROPONINI 0 12 (H) 05/20/2018 1753    TROPONINI 0 17 (H) 05/20/2018 1505    TROPONINI 0 17 (H) 05/20/2018 1214    TROPONINI 0 29 (H) 05/19/2018 0438    TROPONINI 0 30 (H) 05/19/2018 0154    TROPONINI 0 32 (H) 05/18/2018 2229    TROPONINI 0 32 (H) 05/18/2018 1909    TROPONINI 1 15 (H) 05/16/2018 1416    TROPONINI 1 24 (H) 05/16/2018 0522    TROPONINI 1 16 (H) 05/16/2018 0209    TROPONINI 1 09 (H) 05/16/2018 0053    TROPONINI 0 85 (H) 05/15/2018 1935    TROPONINI 0 72 (H) 05/15/2018 1728    TROPONINI 0 47 (H) 05/15/2018 1304    TROPONINI 0 02 12/07/2016 1931    TROPONINI 0 04 (H) 12/07/2016 0907    TROPONINI <0 04 05/22/2015 0123     Results from last 7 days   Lab Units 04/25/23  1045   PH SANAZ  7 072*   PCO2 SANAZ mm Hg 39 5*   PO2 SANAZ mm Hg 66 5*   HCO3 SANAZ mmol/L 11 2*   O2 CONTENT SANAZ ml/dL 14 3   O2 HGB, VENOUS % 78 6     Results from last 7 days   Lab Units 04/25/23  1346   ACETAMINOPHEN LVL ug/mL <2*   ETHANOL LVL mg/dL 45*   SALICYLATE LVL mg/dL <3*     Invalid input(s): EXTPREGUR      Imaging Studies: I have personally reviewed pertinent reports  Counseling / Coordination of Care  Total time spent today 38 minutes  This was a phone consultation

## 2023-04-25 NOTE — H&P
H&P Exam - Critical Care   Bertha Pugh 63 y o  male MRN: [id number]  Unit/Bed#: MICU 08 Encounter: [id number]      -------------------------------------------------------------------------------------------------------------  Chief Complaint: shortness of breath     History of Present Illness     Bertha Pugh is a 63 y o  male who presents with shortness of breath for a couple of days that worsened this morning  PMH stage 4 COPD, alcohol abuse, tobacco abuse, Crohn's disease s/p ileostomy and reversal in 2018  Patient states the only medications that he takes are albuterol and ipratropium  He endorses drinking several glasses of eknji daily, last drink was last night  Denies history of seizures  ED workup demonstrates lactic acidosis of 20 3, leukocytosis WBC 20 57, ammonia 82  14 [facility] 4/25 negative  CT CAP 4/25 showed circumferential mural thickening of esophagus with air-fluid level and hepatomegaly with severe hepatic steatosis  History obtained from chart review and the patient   -------------------------------------------------------------------------------------------------------------  Assessment and Plan:    Neuro:   • Diagnosis: Alcohol abuse   o Last drink 4/24/23  o Ethanol 4/25: 45   o Plan:   - Thiamine, folate  - CIWA   - Monitor for alcohol withdrawal       CV:   • Diagnosis: Hypotension    o Unclear etiology at this time  o Received 2 5L IVF   o Plan:   - Currently on levophed, continue   • Diagnosis: CAD with MI in 2012 s/p RCA stent   o Home atorvastatin 40mg daily   o Plan:   - Continue home statin       Pulm:  • Diagnosis: Stage 4 COPD   o Home albuterol and ipratropium   o Plan:   - Ipratropium   - Holding home albuterol d/t potential of worsening of lactic acidosis   • Diagnosis: Acute respiratory failure    o Low suspicion for COPD or CHF exacerbation  o CT CAP 4/25: moderate centrilobar emphysema   A 2 mm right middle lobe pulmonary nodule (4/147) and a 3 mm right lower lobe solid pulm nodule (4/193), stable since 5/15/2018, therefore consistent with a benign etiology  No new or enlarging pulmonary nodules  o Plan:   - BiPAP   - Maintain SpO2 >88%       GI:   • Diagnosis: Crohn's disease   o H/o transverse colon perforation s/p ileostomy and reversal in 2018  o Currently does not take any medications for Crohns   o CT CAP 4/25: no bowel obstruction  Underdistended descending colon  Postsurgical changes of partial right hemicolectomy  • Diagnosis: Bloody NGT output   o Patient evaluated by acute care surgery in the ED, NGT was inserted due to concern for SBO vs ischemic bowel, ~100 cc of bloody output seen in cannula  o CT CAP 4/25: circumferential mural thickening of esophagus with an air-fluid level, may be related to esophagitis and esophageal dysmotility  o Plan:   - Consider GI consult   - Monitor NGT output and trend H&H   • Diagnosis: Alcoholic hepatitis   o CT CAP 4/25: hepatomegaly with severe hepatic steatosis  o Plan:   - Trend LFTs, INR      :   • Diagnosis: DEBBY   o Cr 2 87, baseline 0 9-1 2  o Plan:   - Continue resuscitation  - Strict I/Os and trend BUN/Cr   • Diagnosis: Anion gap metabolic acidosis   o Anion gap: 28  o Lactic acid initially 20 3 --> 12 9    o VBG 4/25: pH 7 07, pCO2 39 5, HCO3 11 2   o ABG pending   o Plan:   - Toxicology consulted       F/E/N:    • Replete electrolytes as needed  • NPO       Heme/Onc:   • Diagnosis: DVT PPx  o Plan: SCDs      Endo:   • Diagnosis: No acute issues   o Plan: Order A1c  Maintain goal -180  SSI as needed  ID:   • Diagnosis: No acute issues   o Plan: Monitor fever curve and trend WBC       MSK/Skin:   • Diagnosis: DTI PPx  o Plan: Frequent repositioning  PT/OT and OOB as tolerated         Disposition: Admit to Critical Care   Code Status: Level 1 - Full Code  --------------------------------------------------------------------------------------------------------------  Review of Systems    A 12-point, complete review of systems was reviewed and negative except as stated above     Physical Exam  Vitals and nursing note reviewed  Constitutional:       Comments: disheveled and unkempt   HENT:      Head: Normocephalic and atraumatic  Cardiovascular:      Rate and Rhythm: Regular rhythm  Tachycardia present  Pulmonary:      Effort: Tachypnea, accessory muscle usage and respiratory distress present  Breath sounds: No wheezing  Abdominal:      Palpations: Abdomen is soft  Tenderness: There is no abdominal tenderness  There is no guarding or rebound  Hernia: A hernia is present  Skin:     General: Skin is warm and dry  Capillary Refill: Capillary refill takes 2 to 3 seconds  Neurological:      General: No focal deficit present  Mental Status: He is alert          --------------------------------------------------------------------------------------------------------------  Vitals:   Vitals:    04/25/23 1030 04/25/23 1115 04/25/23 1121 04/25/23 1237   BP:  107/72 97/54    BP Location:  Left leg Right arm    Pulse: (!) 126 (!) 130 (!) 134    Resp: (!) 35 (!) 42 (!) 38    Temp: 97 6 °F (36 4 °C)      SpO2: 94% 100% 100% 100%     Temp  Min: 97 6 °F (36 4 °C)  Max: 97 6 °F (36 4 °C)        There is no height or weight on file to calculate BMI    N/A    Laboratory and Diagnostics:  Results from last 7 days   Lab Units 04/25/23  1346 04/25/23  1045   WBC Thousand/uL  --  20 57*   HEMOGLOBIN g/dL 11 4* 12 1   HEMATOCRIT % 33 7* 37 7   PLATELETS Thousands/uL  --  155   NEUTROS PCT %  --  85*   MONOS PCT %  --  7     Results from last 7 days   Lab Units 04/25/23  1045   SODIUM mmol/L 125*   POTASSIUM mmol/L 4 7   CHLORIDE mmol/L 86*   CO2 mmol/L 11*   ANION GAP mmol/L 28*   BUN mg/dL 33*   CREATININE mg/dL 2 87*   CALCIUM mg/dL 8 5   GLUCOSE RANDOM mg/dL 226*   ALT U/L 66   AST U/L 204*   ALK PHOS U/L 263*   ALBUMIN g/dL 2 1*   TOTAL BILIRUBIN mg/dL 1 10*          Results from last 7 days   Lab Units 04/25/23  1045   INR  1 21*   PTT seconds 28          Results from last 7 days   Lab Units 04/25/23  1430 04/25/23  1045   LACTIC ACID mmol/L 12 9* 20 3*     ABG:  Results from last 7 days   Lab Units 04/25/23  1430   PH ART  7 245*   PCO2 ART mm Hg 29 2*   PO2 ART mm Hg 364 1*   HCO3 ART mmol/L 12 4*   BASE EXC ART mmol/L -13 5   ABG SOURCE  Artery     VBG:  Results from last 7 days   Lab Units 04/25/23  1430 04/25/23  1045   PH SANAZ   --  7 072*   PCO2 SANAZ mm Hg  --  39 5*   PO2 SANAZ mm Hg  --  66 5*   HCO3 SANAZ mmol/L  --  11 2*   BASE EXC SANAZ mmol/L  --  -18 1   ABG SOURCE  Artery  --      Results from last 7 days   Lab Units 04/25/23  1045   PROCALCITONIN ng/ml 1 73*       Micro:  Results from last 7 days   Lab Units 04/25/23  1112 04/25/23  1045   BLOOD CULTURE  Received in Microbiology Lab  Culture in Progress  Received in Microbiology Lab  Culture in Progress  Imaging: I have personally reviewed pertinent reports  Historical Information   Past Medical History:   Diagnosis Date   • Anxiety    • Asthma    • Cardiac disease    • Cervical stenosis (uterine cervix)    • Chest pain    • Chronic kidney disease    • Colitis    • Colon polyps    • COPD (chronic obstructive pulmonary disease) (HCC)     2L @ HS and PRN   • Coronary artery disease    • Diverticulitis    • Esophageal reflux    • 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[facility])    • Hyperlipidemia    • Hypertension    • IBD (inflammatory bowel disease)    • Myocardial infarction [facility])    • Old myocardial infarction    • Perforation of colon ([facility])    • Type 2 diabetes, diet controlled ([facility] [address] )    • Ulcerative colitis ([facility] [address] )      Past Surgical History:   Procedure Laterality Date   • ANGIOPLASTY     • APPENDECTOMY     • COLON SURGERY     • COLONOSCOPY N/A 10/24/2016    Procedure: COLONOSCOPY;  Surgeon: Minda Noriega MD;  [facility];   Service:    • CORONARY ANGIOPLASTY WITH STENT PLACEMENT     • ESOPHAGOGASTRODUODENOSCOPY N/A 10/24/2016    Procedure: ESOPHAGOGASTRODUODENOSCOPY (EGD); Surgeon: Arnol Wang MD;  [facility]; Service:    • EXPLORATORY LAPAROTOMY W/ BOWEL RESECTION N/A 5/22/2018    Procedure: EXPLORATORY LAPAROTOMY, ILIOCOLECTOMY, ILIOCOLONIC ANASTAMOSIS, LOOP ILIOSTOMY, REPAIR OF SEROSAL TEAR, EXTENSIVE LYSIS OF ADHESIONS, WOUND VAC PLACEMENT;  Surgeon: Arnol Wang MD;  [facility];  Service: Colorectal   • HEMICOLECTOMY     • ILEOSTOMY CLOSURE N/A 10/5/2018    Procedure: [place];  Surgeon: Arnol Wang MD;  [facility];  Service: Colorectal   • OTHER SURGICAL HISTORY      stent indications acute myocardial infarction   • MI COLONOSCOPY FLX DX W/COLLJ SPEC WHEN PFRMD N/A 2/6/2018    Procedure: COLONOSCOPY;  Surgeon: Arnol Wang MD;  [facility]: [facility]; Service: Colorectal   • MI COLONOSCOPY FLX DX W/COLLJ SPEC WHEN PFRMD N/A 10/4/2018    Procedure: COLONOSCOPY;  Surgeon: Arnol Wang MD;  [facility];   Service: Colorectal   • TONSILLECTOMY       Social History   Social History     Substance and Sexual Activity   Alcohol Use Yes     Social History     Substance and Sexual Activity   Drug Use No     Social History     Tobacco Use   Smoking Status Every Day   • Packs/day: 3 00   • Years: 40 00   • Pack years: 120 00   • Types: Cigars, Cigarettes   • Start date: 1978   Smokeless Tobacco Never   Tobacco Comments    4-5cigars per day     Family History:   Family History   Problem Relation Age of Onset   • Coronary artery disease Father    • Crohn's disease Father    • Stroke Father    • Diabetes Family          Medications:  Current Facility-Administered Medications   Medication Dose Route Frequency   • albuterol inhalation solution 2 5 mg  2 5 mg Nebulization Q4H PRN   • [START ON 4/26/2023] cefepime (MAXIPIME) 1,000 mg in dextrose 5 % 50 mL IVPB  1,000 mg Intravenous Q12H   • chlorhexidine (PERIDEX) 0 12 % oral rinse 15 mL  15 mL Mouth/Throat Q12H [facility]   • dexmedeTOMIDine (Precedex) 400 mcg in sodium chloride 0 9% 100 mL  0 1-0 7 mcg/kg/hr Intravenous Titrated   • EPINEPHrine (ADRENALIN) 0 1 mg/mL injection **ADS Override Pull**       • folic acid (FOLVITE) tablet 1,000 mcg  1,000 mcg Oral Daily   • ipratropium (ATROVENT) 0 02 % inhalation solution 0 5 mg  0 5 mg Nebulization TID   • lactulose retention enema 200 g  200 g Rectal BID   • levalbuterol (XOPENEX) inhalation solution 1 25 mg  1 25 mg Nebulization TID   • metroNIDAZOLE (FLAGYL) IVPB (premix) 500 mg 100 mL  500 mg Intravenous Q8H   • multivitamin stress formula tablet 1 tablet  1 tablet Oral Daily   • norepinephrine (LEVOPHED) 4 mg (STANDARD CONCENTRATION) IV in sodium chloride 0 9% 250 mL  1-30 mcg/min Intravenous Titrated   • thiamine (VITAMIN B1) 500 mg in sodium chloride 0 9 % 50 mL IVPB  500 mg Intravenous Daily     Home medications:  Prior to Admission Medications   Prescriptions Last Dose Informant Patient Reported? Taking?    Ascorbic Acid (vitamin C) 100 MG tablet   Yes No   Sig: Take 100 mg by mouth daily   Multiple Vitamins-Minerals (Centrum Silver 50+Men) TABS   Yes No   Sig: Take by mouth   Nebulizers (AERONEB GO NEBULIZER HANDSET) MISC   No No   Sig: by Does not apply route 2 (two) times a day as needed (wheezing)   Patient not taking: No sig reported   albuterol (2 5 mg/3 mL) 0 083 % nebulizer solution   No No   Sig: Take 1 vial (2 5 mg total) by nebulization every 4 (four) hours as needed for wheezing or shortness of breath   albuterol (2 5 mg/3 mL) 0 083 % nebulizer solution   No No   Sig: TAKE 1 VIAL (2 5 MG TOTAL) BY NEBULIZATION 4 (FOUR) TIMES A DAY   albuterol (PROVENTIL HFA,VENTOLIN HFA) 90 mcg/act inhaler   No No   Sig: INHALE 2 PUFFS BY MOUTH EVERY 6 HOURS AS NEEDED FOR WHEEZING   aspirin 81 mg chewable tablet   No No   Sig: Chew 1 tablet (81 mg total) daily   atorvastatin (LIPITOR) 40 mg tablet   No No   Sig: TAKE 1 TABLET BY MOUTH DAILY WITH DINNER   fluticasone-umeclidinium-vilanterol (Trelegy "Ellipta) 100-62 5-25 MCG/INH inhaler   No No   Sig: Inhale 1 puff  in the morning  Rinse mouth after use      folic acid (FOLVITE) 1 mg tablet   No No   Sig: TAKE 1 TABLET BY MOUTH EVERY DAY   gabapentin (NEURONTIN) 300 mg capsule   No No   Sig: TAKE 1 CAPSULE BY MOUTH THREE TIMES A DAY   ipratropium (ATROVENT) 0 02 % nebulizer solution   No No   Sig: TAKE 2 5 ML (0 5 MG TOTAL) BY NEBULIZATION 4 (FOUR) TIMES A DAY   sulfaSALAzine (AZULFIDINE) 500 mg tablet   No No   Sig: Take 1 tablet (500 mg total) by mouth 4 (four) times a day      Facility-Administered Medications: None     Allergies: Allergies   Allergen Reactions   • Medical Tape    • Other      Brown cloth band aids      • Latex Rash     ------------------------------------------------------------------------------------------------------------  Advance Directive and Living Will:      Power of :    POLST:    ------------------------------------------------------------------------------------------------------------  Anticipated Length of Stay is > 2 midnights      Miguel Ángel Falk MD        Portions of the record may have been created with voice recognition software  Occasional wrong word or \"sound a like\" substitutions may have occurred due to the inherent limitations of voice recognition software    Read the chart carefully and recognize, using context, where substitutions have occurred    "

## 2023-04-26 ENCOUNTER — APPOINTMENT (INPATIENT)
Dept: RADIOLOGY | Facility: HOSPITAL | Age: 63
End: 2023-04-26

## 2023-04-26 ENCOUNTER — APPOINTMENT (INPATIENT)
Dept: NON INVASIVE DIAGNOSTICS | Facility: HOSPITAL | Age: 63
End: 2023-04-26

## 2023-04-26 LAB
ALBUMIN SERPL BCP-MCNC: 1.9 G/DL (ref 3.5–5)
ALP SERPL-CCNC: 168 U/L (ref 46–116)
ALT SERPL W P-5'-P-CCNC: 54 U/L (ref 12–78)
AMMONIA PLAS-SCNC: 32 UMOL/L (ref 11–35)
AMPHETAMINES UR QL SCN: NEGATIVE NG/ML
ANION GAP SERPL CALCULATED.3IONS-SCNC: 11 MMOL/L (ref 4–13)
ANION GAP SERPL CALCULATED.3IONS-SCNC: 17 MMOL/L (ref 4–13)
ANION GAP SERPL CALCULATED.3IONS-SCNC: 4 MMOL/L (ref 4–13)
ANION GAP SERPL CALCULATED.3IONS-SCNC: 6 MMOL/L (ref 4–13)
ANION GAP SERPL CALCULATED.3IONS-SCNC: 6 MMOL/L (ref 4–13)
ANION GAP SERPL CALCULATED.3IONS-SCNC: 8 MMOL/L (ref 4–13)
AORTIC VALVE MEAN VELOCITY: 12.2 M/S
APICAL FOUR CHAMBER EJECTION FRACTION: 52 %
ARTERIAL PATENCY WRIST A: YES
AST SERPL W P-5'-P-CCNC: 140 U/L (ref 5–45)
AV AREA BY CONTINUOUS VTI: 1.9 CM2
AV AREA PEAK VELOCITY: 1.7 CM2
AV LVOT MEAN GRADIENT: 2 MMHG
AV LVOT PEAK GRADIENT: 3 MMHG
AV MEAN GRADIENT: 7 MMHG
AV PEAK GRADIENT: 13 MMHG
AV VALVE AREA: 1.86 CM2
AV VELOCITY RATIO: 0.48
BARBITURATES UR QL SCN: NEGATIVE NG/ML
BASE EX.OXY STD BLDV CALC-SCNC: 71.7 % (ref 60–80)
BASE EX.OXY STD BLDV CALC-SCNC: 82.1 % (ref 60–80)
BASE EX.OXY STD BLDV CALC-SCNC: 84.8 % (ref 60–80)
BASE EXCESS BLDA CALC-SCNC: -0.3 MMOL/L
BASE EXCESS BLDV CALC-SCNC: -1.7 MMOL/L
BASE EXCESS BLDV CALC-SCNC: -4.5 MMOL/L
BASE EXCESS BLDV CALC-SCNC: 1.9 MMOL/L
BASOPHILS # BLD AUTO: 0.02 THOUSANDS/ÂΜL (ref 0–0.1)
BASOPHILS NFR BLD AUTO: 0 % (ref 0–1)
BENZODIAZ UR QL: NEGATIVE NG/ML
BILIRUB DIRECT SERPL-MCNC: 0.93 MG/DL (ref 0–0.2)
BILIRUB SERPL-MCNC: 1.38 MG/DL (ref 0.2–1)
BODY TEMPERATURE: 97.9 DEGREES FEHRENHEIT
BUN SERPL-MCNC: 31 MG/DL (ref 5–25)
BUN SERPL-MCNC: 33 MG/DL (ref 5–25)
BUN SERPL-MCNC: 34 MG/DL (ref 5–25)
BUN SERPL-MCNC: 36 MG/DL (ref 5–25)
BZE UR QL: NEGATIVE NG/ML
CA-I BLD-SCNC: 0.94 MMOL/L (ref 1.12–1.32)
CALCIUM SERPL-MCNC: 7 MG/DL (ref 8.3–10.1)
CALCIUM SERPL-MCNC: 7.1 MG/DL (ref 8.3–10.1)
CALCIUM SERPL-MCNC: 7.4 MG/DL (ref 8.3–10.1)
CALCIUM SERPL-MCNC: 7.5 MG/DL (ref 8.3–10.1)
CALCIUM SERPL-MCNC: 7.6 MG/DL (ref 8.3–10.1)
CALCIUM SERPL-MCNC: 7.6 MG/DL (ref 8.3–10.1)
CANNABINOIDS UR QL SCN: NEGATIVE NG/ML
CHLORIDE SERPL-SCNC: 89 MMOL/L (ref 96–108)
CHLORIDE SERPL-SCNC: 93 MMOL/L (ref 96–108)
CHLORIDE SERPL-SCNC: 94 MMOL/L (ref 96–108)
CHLORIDE SERPL-SCNC: 95 MMOL/L (ref 96–108)
CHLORIDE SERPL-SCNC: 98 MMOL/L (ref 96–108)
CHLORIDE SERPL-SCNC: 98 MMOL/L (ref 96–108)
CK SERPL-CCNC: 534 U/L (ref 39–308)
CO2 SERPL-SCNC: 19 MMOL/L (ref 21–32)
CO2 SERPL-SCNC: 21 MMOL/L (ref 21–32)
CO2 SERPL-SCNC: 23 MMOL/L (ref 21–32)
CO2 SERPL-SCNC: 24 MMOL/L (ref 21–32)
CO2 SERPL-SCNC: 25 MMOL/L (ref 21–32)
CO2 SERPL-SCNC: 26 MMOL/L (ref 21–32)
CREAT SERPL-MCNC: 2.22 MG/DL (ref 0.6–1.3)
CREAT SERPL-MCNC: 2.29 MG/DL (ref 0.6–1.3)
CREAT SERPL-MCNC: 2.38 MG/DL (ref 0.6–1.3)
CREAT SERPL-MCNC: 2.44 MG/DL (ref 0.6–1.3)
CREAT SERPL-MCNC: 2.5 MG/DL (ref 0.6–1.3)
CREAT SERPL-MCNC: 2.78 MG/DL (ref 0.6–1.3)
DOP CALC AO PEAK VEL: 1.78 M/S
DOP CALC AO VTI: 25.82 CM
DOP CALC LVOT AREA: 3.46 CM2
DOP CALC LVOT DIAMETER: 2.1 CM
DOP CALC LVOT PEAK VEL VTI: 13.89 CM
DOP CALC LVOT PEAK VEL: 0.85 M/S
DOP CALC LVOT STROKE INDEX: 26.2 ML/M2
DOP CALC LVOT STROKE VOLUME: 48.09
E WAVE DECELERATION TIME: 114 MS
EOSINOPHIL # BLD AUTO: 0.01 THOUSAND/ÂΜL (ref 0–0.61)
EOSINOPHIL NFR BLD AUTO: 0 % (ref 0–6)
ERYTHROCYTE [DISTWIDTH] IN BLOOD BY AUTOMATED COUNT: 14 % (ref 11.6–15.1)
ETHYLENE GLYCOL SERPLBLD-MCNC: NEGATIVE UG/ML
FOLATE SERPL-MCNC: 2.3 NG/ML (ref 3.1–17.5)
FRACTIONAL SHORTENING: 19 (ref 28–44)
GFR SERPL CREATININE-BSD FRML MDRD: 23 ML/MIN/1.73SQ M
GFR SERPL CREATININE-BSD FRML MDRD: 26 ML/MIN/1.73SQ M
GFR SERPL CREATININE-BSD FRML MDRD: 27 ML/MIN/1.73SQ M
GFR SERPL CREATININE-BSD FRML MDRD: 28 ML/MIN/1.73SQ M
GFR SERPL CREATININE-BSD FRML MDRD: 29 ML/MIN/1.73SQ M
GFR SERPL CREATININE-BSD FRML MDRD: 31 ML/MIN/1.73SQ M
GLUCOSE SERPL-MCNC: 128 MG/DL (ref 65–140)
GLUCOSE SERPL-MCNC: 129 MG/DL (ref 65–140)
GLUCOSE SERPL-MCNC: 149 MG/DL (ref 65–140)
GLUCOSE SERPL-MCNC: 170 MG/DL (ref 65–140)
GLUCOSE SERPL-MCNC: 210 MG/DL (ref 65–140)
GLUCOSE SERPL-MCNC: 214 MG/DL (ref 65–140)
GLUCOSE SERPL-MCNC: 226 MG/DL (ref 65–140)
GLUCOSE SERPL-MCNC: 230 MG/DL (ref 65–140)
GLUCOSE SERPL-MCNC: 234 MG/DL (ref 65–140)
GLUCOSE SERPL-MCNC: 234 MG/DL (ref 65–140)
GLUCOSE SERPL-MCNC: 262 MG/DL (ref 65–140)
GLYCOLATE SERPL-MCNC: NEGATIVE
HCO3 BLDA-SCNC: 25.9 MMOL/L (ref 22–28)
HCO3 BLDV-SCNC: 24.4 MMOL/L (ref 24–30)
HCO3 BLDV-SCNC: 24.7 MMOL/L (ref 24–30)
HCO3 BLDV-SCNC: 30.6 MMOL/L (ref 24–30)
HCT VFR BLD AUTO: 23.4 % (ref 36.5–49.3)
HCT VFR BLD AUTO: 24.3 % (ref 36.5–49.3)
HGB BLD-MCNC: 8 G/DL (ref 12–17)
HGB BLD-MCNC: 8.2 G/DL (ref 12–17)
HOROWITZ INDEX BLDA+IHG-RTO: 50 MM[HG]
HOROWITZ INDEX BLDA+IHG-RTO: 50 MM[HG]
IMM GRANULOCYTES # BLD AUTO: 0.12 THOUSAND/UL (ref 0–0.2)
IMM GRANULOCYTES NFR BLD AUTO: 1 % (ref 0–2)
INR PPP: 1.3 (ref 0.84–1.19)
INTERVENTRICULAR SEPTUM IN DIASTOLE (PARASTERNAL SHORT AXIS VIEW): 0.9 CM
INTERVENTRICULAR SEPTUM: 0.9 CM (ref 0.6–1.1)
IPAP: 18
LACTATE SERPL-SCNC: 2.6 MMOL/L (ref 0.5–2)
LACTATE SERPL-SCNC: 4.1 MMOL/L (ref 0.5–2)
LACTATE SERPL-SCNC: 5.9 MMOL/L (ref 0.5–2)
LACTATE SERPL-SCNC: 6.6 MMOL/L (ref 0.5–2)
LEFT ATRIUM SIZE: 3.8 CM
LEFT INTERNAL DIMENSION IN SYSTOLE: 4.3 CM (ref 2.1–4)
LEFT VENTRICULAR INTERNAL DIMENSION IN DIASTOLE: 5.3 CM (ref 3.5–6)
LEFT VENTRICULAR POSTERIOR WALL IN END DIASTOLE: 1.1 CM
LEFT VENTRICULAR STROKE VOLUME: 55 ML
LIPASE SERPL-CCNC: 595 U/L (ref 73–393)
LVSV (TEICH): 55 ML
LYMPHOCYTES # BLD AUTO: 0.62 THOUSANDS/ÂΜL (ref 0.6–4.47)
LYMPHOCYTES NFR BLD AUTO: 6 % (ref 14–44)
MAGNESIUM SERPL-MCNC: 1.1 MG/DL (ref 1.6–2.6)
MAGNESIUM SERPL-MCNC: 2.3 MG/DL (ref 1.6–2.6)
MCH RBC QN AUTO: 33.2 PG (ref 26.8–34.3)
MCHC RBC AUTO-ENTMCNC: 33.7 G/DL (ref 31.4–37.4)
MCV RBC AUTO: 98 FL (ref 82–98)
METHADONE UR QL SCN: NEGATIVE NG/ML
METHANOL SERPL-MCNC: 0 MG/DL
MONOCYTES # BLD AUTO: 0.77 THOUSAND/ÂΜL (ref 0.17–1.22)
MONOCYTES NFR BLD AUTO: 7 % (ref 4–12)
MRSA NOSE QL CULT: ABNORMAL
MV PEAK A VEL: 0.47 M/S
MV PEAK E VEL: 64 CM/S
MV STENOSIS PRESSURE HALF TIME: 33 MS
MV VALVE AREA P 1/2 METHOD: 6.67
NASAL CANNULA: 10
NEUTROPHILS # BLD AUTO: 8.98 THOUSANDS/ÂΜL (ref 1.85–7.62)
NEUTS SEG NFR BLD AUTO: 86 % (ref 43–75)
NON VENT- BIPAP: ABNORMAL
NRBC BLD AUTO-RTO: 1 /100 WBCS
O2 CT BLDA-SCNC: 12.3 ML/DL (ref 16–23)
O2 CT BLDV-SCNC: 10.9 ML/DL
O2 CT BLDV-SCNC: 9 ML/DL
O2 CT BLDV-SCNC: 9.6 ML/DL
OPIATES UR QL: NEGATIVE NG/ML
OSMOLALITY UR/SERPL-RTO: 281 MMOL/KG (ref 282–298)
OSMOLALITY UR: 487 MMOL/KG
OXYHGB MFR BLDA: 98.5 % (ref 94–97)
PCO2 BLDA: 50.7 MM HG (ref 36–44)
PCO2 BLDV: 50.1 MM HG (ref 42–50)
PCO2 BLDV: 68.3 MM HG (ref 42–50)
PCO2 BLDV: 78.1 MM HG (ref 42–50)
PCP UR QL: NEGATIVE NG/ML
PEEP MAX SETTING VENT: 8 CM[H2O]
PEEP RESPIRATORY: 8 CM[H2O]
PEEP RESPIRATORY: 8 CM[H2O]
PH BLDA: 7.33 [PH] (ref 7.35–7.45)
PH BLDV: 7.17 [PH] (ref 7.3–7.4)
PH BLDV: 7.21 [PH] (ref 7.3–7.4)
PH BLDV: 7.31 [PH] (ref 7.3–7.4)
PHOSPHATE SERPL-MCNC: 2.8 MG/DL (ref 2.3–4.1)
PLATELET # BLD AUTO: 74 THOUSANDS/UL (ref 149–390)
PMV BLD AUTO: 9 FL (ref 8.9–12.7)
PO2 BLDA: 163.2 MM HG (ref 75–129)
PO2 BLDV: 39 MM HG (ref 35–45)
PO2 BLDV: 50.4 MM HG (ref 35–45)
PO2 BLDV: 62 MM HG (ref 35–45)
POTASSIUM SERPL-SCNC: 4 MMOL/L (ref 3.5–5.3)
POTASSIUM SERPL-SCNC: 4 MMOL/L (ref 3.5–5.3)
POTASSIUM SERPL-SCNC: 4.1 MMOL/L (ref 3.5–5.3)
POTASSIUM SERPL-SCNC: 4.1 MMOL/L (ref 3.5–5.3)
POTASSIUM SERPL-SCNC: 4.3 MMOL/L (ref 3.5–5.3)
POTASSIUM SERPL-SCNC: 4.6 MMOL/L (ref 3.5–5.3)
PROPOXYPH UR QL SCN: NEGATIVE NG/ML
PROT SERPL-MCNC: 4.7 G/DL (ref 6.4–8.4)
PROTHROMBIN TIME: 16.4 SECONDS (ref 11.6–14.5)
RBC # BLD AUTO: 2.47 MILLION/UL (ref 3.88–5.62)
RIGHT VENTRICLE ID DIMENSION: 4.3 CM
SL CV PED ECHO LEFT VENTRICLE DIASTOLIC VOLUME (MOD BIPLANE) 2D: 137 ML
SL CV PED ECHO LEFT VENTRICLE SYSTOLIC VOLUME (MOD BIPLANE) 2D: 83 ML
SODIUM 24H UR-SCNC: <5 MOL/L
SODIUM SERPL-SCNC: 125 MMOL/L (ref 135–147)
SODIUM SERPL-SCNC: 126 MMOL/L (ref 135–147)
SODIUM SERPL-SCNC: 128 MMOL/L (ref 135–147)
SODIUM SERPL-SCNC: 128 MMOL/L (ref 135–147)
TRICUSPID ANNULAR PLANE SYSTOLIC EXCURSION: 2.2 CM
VANCOMYCIN SERPL-MCNC: 19.5 UG/ML (ref 10–20)
VENT AC: 20
VENT AC: 20
VENT BIPAP FIO2: 30 %
VENT- AC: AC
VENT- AC: AC
VT SETTING VENT: 450 ML
VT SETTING VENT: 450 ML
WBC # BLD AUTO: 10.52 THOUSAND/UL (ref 4.31–10.16)

## 2023-04-26 PROCEDURE — 5A1945Z RESPIRATORY VENTILATION, 24-96 CONSECUTIVE HOURS: ICD-10-PCS

## 2023-04-26 PROCEDURE — 0BH17EZ INSERTION OF ENDOTRACHEAL AIRWAY INTO TRACHEA, VIA NATURAL OR ARTIFICIAL OPENING: ICD-10-PCS

## 2023-04-26 RX ORDER — MICONAZOLE NITRATE 20 MG/G
CREAM TOPICAL 2 TIMES DAILY
Status: DISCONTINUED | OUTPATIENT
Start: 2023-04-26 | End: 2023-05-06 | Stop reason: HOSPADM

## 2023-04-26 RX ORDER — LORAZEPAM 2 MG/ML
2 INJECTION INTRAMUSCULAR ONCE
Status: COMPLETED | OUTPATIENT
Start: 2023-04-26 | End: 2023-04-26

## 2023-04-26 RX ORDER — LORAZEPAM 2 MG/ML
INJECTION INTRAMUSCULAR
Status: COMPLETED
Start: 2023-04-26 | End: 2023-04-26

## 2023-04-26 RX ORDER — NICOTINE 21 MG/24HR
21 PATCH, TRANSDERMAL 24 HOURS TRANSDERMAL DAILY
Status: DISCONTINUED | OUTPATIENT
Start: 2023-04-26 | End: 2023-05-06 | Stop reason: HOSPADM

## 2023-04-26 RX ORDER — ACETAMINOPHEN 325 MG/1
650 TABLET ORAL EVERY 6 HOURS PRN
Status: DISCONTINUED | OUTPATIENT
Start: 2023-04-26 | End: 2023-04-27

## 2023-04-26 RX ORDER — METHYLPREDNISOLONE SODIUM SUCCINATE 40 MG/ML
40 INJECTION, POWDER, LYOPHILIZED, FOR SOLUTION INTRAMUSCULAR; INTRAVENOUS EVERY 8 HOURS SCHEDULED
Status: DISCONTINUED | OUTPATIENT
Start: 2023-04-26 | End: 2023-04-26

## 2023-04-26 RX ORDER — CALCIUM GLUCONATE 20 MG/ML
2 INJECTION, SOLUTION INTRAVENOUS ONCE
Status: COMPLETED | OUTPATIENT
Start: 2023-04-26 | End: 2023-04-26

## 2023-04-26 RX ORDER — VANCOMYCIN HYDROCHLORIDE 1 G/200ML
1000 INJECTION, SOLUTION INTRAVENOUS ONCE
Status: COMPLETED | OUTPATIENT
Start: 2023-04-26 | End: 2023-04-26

## 2023-04-26 RX ORDER — MAGNESIUM SULFATE HEPTAHYDRATE 40 MG/ML
2 INJECTION, SOLUTION INTRAVENOUS ONCE
Status: COMPLETED | OUTPATIENT
Start: 2023-04-26 | End: 2023-04-26

## 2023-04-26 RX ORDER — PROPOFOL 10 MG/ML
5-50 INJECTION, EMULSION INTRAVENOUS
Status: DISCONTINUED | OUTPATIENT
Start: 2023-04-26 | End: 2023-04-27

## 2023-04-26 RX ORDER — DEXTROSE AND SODIUM CHLORIDE 5; .9 G/100ML; G/100ML
125 INJECTION, SOLUTION INTRAVENOUS CONTINUOUS
Status: DISCONTINUED | OUTPATIENT
Start: 2023-04-26 | End: 2023-04-27

## 2023-04-26 RX ORDER — LANOLIN ALCOHOL/MO/W.PET/CERES
100 CREAM (GRAM) TOPICAL DAILY
Status: DISCONTINUED | OUTPATIENT
Start: 2023-04-29 | End: 2023-05-06 | Stop reason: HOSPADM

## 2023-04-26 RX ORDER — FUROSEMIDE 10 MG/ML
40 INJECTION INTRAMUSCULAR; INTRAVENOUS ONCE
Status: COMPLETED | OUTPATIENT
Start: 2023-04-26 | End: 2023-04-26

## 2023-04-26 RX ORDER — METHYLPREDNISOLONE SODIUM SUCCINATE 40 MG/ML
INJECTION, POWDER, LYOPHILIZED, FOR SOLUTION INTRAMUSCULAR; INTRAVENOUS
Status: COMPLETED
Start: 2023-04-26 | End: 2023-04-26

## 2023-04-26 RX ORDER — METHYLPREDNISOLONE SODIUM SUCCINATE 40 MG/ML
40 INJECTION, POWDER, LYOPHILIZED, FOR SOLUTION INTRAMUSCULAR; INTRAVENOUS EVERY 12 HOURS SCHEDULED
Status: DISCONTINUED | OUTPATIENT
Start: 2023-04-26 | End: 2023-04-27

## 2023-04-26 RX ORDER — PHENOBARBITAL SODIUM 65 MG/ML
130 INJECTION INTRAMUSCULAR ONCE
Status: COMPLETED | OUTPATIENT
Start: 2023-04-26 | End: 2023-04-26

## 2023-04-26 RX ORDER — SODIUM CHLORIDE FOR INHALATION 0.9 %
VIAL, NEBULIZER (ML) INHALATION
Status: COMPLETED
Start: 2023-04-26 | End: 2023-04-26

## 2023-04-26 RX ORDER — SODIUM CHLORIDE FOR INHALATION 0.9 %
3 VIAL, NEBULIZER (ML) INHALATION ONCE
Status: COMPLETED | OUTPATIENT
Start: 2023-04-26 | End: 2023-04-26

## 2023-04-26 RX ADMIN — PHENOBARBITAL SODIUM 650 MG: 65 INJECTION INTRAMUSCULAR at 12:58

## 2023-04-26 RX ADMIN — PHENOL 1 SPRAY: 1.5 LIQUID ORAL at 01:44

## 2023-04-26 RX ADMIN — Medication 3 ML: at 17:13

## 2023-04-26 RX ADMIN — SODIUM CHLORIDE 500 ML: 0.9 INJECTION, SOLUTION INTRAVENOUS at 01:42

## 2023-04-26 RX ADMIN — FOLIC ACID 1000 MCG: 1 TABLET ORAL at 08:03

## 2023-04-26 RX ADMIN — MAGNESIUM SULFATE HEPTAHYDRATE 2 G: 40 INJECTION, SOLUTION INTRAVENOUS at 07:59

## 2023-04-26 RX ADMIN — ISODIUM CHLORIDE 3 ML: 0.03 SOLUTION RESPIRATORY (INHALATION) at 17:13

## 2023-04-26 RX ADMIN — PANTOPRAZOLE SODIUM 40 MG: 40 INJECTION, POWDER, FOR SOLUTION INTRAVENOUS at 21:18

## 2023-04-26 RX ADMIN — PROPOFOL 25 MCG/KG/MIN: 10 INJECTION, EMULSION INTRAVENOUS at 23:45

## 2023-04-26 RX ADMIN — FUROSEMIDE 40 MG: 10 INJECTION, SOLUTION INTRAMUSCULAR; INTRAVENOUS at 08:30

## 2023-04-26 RX ADMIN — METHYLPREDNISOLONE SODIUM SUCCINATE 40 MG: 40 INJECTION, POWDER, LYOPHILIZED, FOR SOLUTION INTRAMUSCULAR; INTRAVENOUS at 08:29

## 2023-04-26 RX ADMIN — CEFEPIME 1000 MG: 1 INJECTION, POWDER, FOR SOLUTION INTRAMUSCULAR; INTRAVENOUS at 12:14

## 2023-04-26 RX ADMIN — ALBUTEROL SULFATE 10 MG: 2.5 SOLUTION RESPIRATORY (INHALATION) at 17:09

## 2023-04-26 RX ADMIN — IPRATROPIUM BROMIDE 0.5 MG: 0.5 SOLUTION RESPIRATORY (INHALATION) at 14:07

## 2023-04-26 RX ADMIN — METHYLPREDNISOLONE SODIUM SUCCINATE 40 MG: 40 INJECTION, POWDER, FOR SOLUTION INTRAMUSCULAR; INTRAVENOUS at 21:32

## 2023-04-26 RX ADMIN — IPRATROPIUM BROMIDE 0.5 MG: 0.5 SOLUTION RESPIRATORY (INHALATION) at 18:47

## 2023-04-26 RX ADMIN — LEVALBUTEROL HYDROCHLORIDE 1.25 MG: 1.25 SOLUTION, CONCENTRATE RESPIRATORY (INHALATION) at 14:07

## 2023-04-26 RX ADMIN — METRONIDAZOLE 500 MG: 500 INJECTION, SOLUTION INTRAVENOUS at 08:02

## 2023-04-26 RX ADMIN — INSULIN LISPRO 3 UNITS: 100 INJECTION, SOLUTION INTRAVENOUS; SUBCUTANEOUS at 01:00

## 2023-04-26 RX ADMIN — METHYLPREDNISOLONE SODIUM SUCCINATE 40 MG: 40 INJECTION, POWDER, LYOPHILIZED, FOR SOLUTION INTRAMUSCULAR; INTRAVENOUS at 21:32

## 2023-04-26 RX ADMIN — MAGNESIUM SULFATE HEPTAHYDRATE 2 G: 40 INJECTION, SOLUTION INTRAVENOUS at 05:58

## 2023-04-26 RX ADMIN — CHLORHEXIDINE GLUCONATE 0.12% ORAL RINSE 15 ML: 1.2 LIQUID ORAL at 21:18

## 2023-04-26 RX ADMIN — PHENOBARBITAL SODIUM 230 MG: 65 INJECTION INTRAMUSCULAR at 08:56

## 2023-04-26 RX ADMIN — ACETAMINOPHEN 650 MG: 325 TABLET ORAL at 03:41

## 2023-04-26 RX ADMIN — LORAZEPAM 2 MG: 2 INJECTION INTRAMUSCULAR at 17:32

## 2023-04-26 RX ADMIN — PROPOFOL 20 MCG/KG/MIN: 10 INJECTION, EMULSION INTRAVENOUS at 18:33

## 2023-04-26 RX ADMIN — ALBUTEROL SULFATE 2.5 MG: 2.5 SOLUTION RESPIRATORY (INHALATION) at 03:06

## 2023-04-26 RX ADMIN — INSULIN LISPRO 2 UNITS: 100 INJECTION, SOLUTION INTRAVENOUS; SUBCUTANEOUS at 12:18

## 2023-04-26 RX ADMIN — VASOPRESSIN 0.04 UNITS/MIN: 20 INJECTION INTRAVENOUS at 12:27

## 2023-04-26 RX ADMIN — CHLORHEXIDINE GLUCONATE 0.12% ORAL RINSE 15 ML: 1.2 LIQUID ORAL at 08:03

## 2023-04-26 RX ADMIN — NOREPINEPHRINE BITARTRATE 10 MCG/MIN: 1 INJECTION, SOLUTION, CONCENTRATE INTRAVENOUS at 19:39

## 2023-04-26 RX ADMIN — METHYLPREDNISOLONE SODIUM SUCCINATE 40 MG: 40 INJECTION, POWDER, FOR SOLUTION INTRAMUSCULAR; INTRAVENOUS at 08:29

## 2023-04-26 RX ADMIN — THIAMINE HYDROCHLORIDE 500 MG: 100 INJECTION, SOLUTION INTRAMUSCULAR; INTRAVENOUS at 21:18

## 2023-04-26 RX ADMIN — VASOPRESSIN 0.04 UNITS/MIN: 20 INJECTION INTRAVENOUS at 03:14

## 2023-04-26 RX ADMIN — THIAMINE HYDROCHLORIDE 500 MG: 100 INJECTION, SOLUTION INTRAMUSCULAR; INTRAVENOUS at 16:27

## 2023-04-26 RX ADMIN — PHENOBARBITAL SODIUM 130 MG: 65 INJECTION INTRAMUSCULAR at 20:45

## 2023-04-26 RX ADMIN — NICOTINE 21 MG: 21 PATCH, EXTENDED RELEASE TRANSDERMAL at 08:04

## 2023-04-26 RX ADMIN — LEVALBUTEROL HYDROCHLORIDE 1.25 MG: 1.25 SOLUTION, CONCENTRATE RESPIRATORY (INHALATION) at 07:12

## 2023-04-26 RX ADMIN — LORAZEPAM 2 MG: 2 INJECTION INTRAMUSCULAR; INTRAVENOUS at 17:32

## 2023-04-26 RX ADMIN — PHENOBARBITAL SODIUM 130 MG: 65 INJECTION INTRAMUSCULAR at 16:27

## 2023-04-26 RX ADMIN — THIAMINE HYDROCHLORIDE 500 MG: 100 INJECTION, SOLUTION INTRAMUSCULAR; INTRAVENOUS at 08:04

## 2023-04-26 RX ADMIN — PANTOPRAZOLE SODIUM 40 MG: 40 INJECTION, POWDER, FOR SOLUTION INTRAVENOUS at 08:04

## 2023-04-26 RX ADMIN — METRONIDAZOLE 500 MG: 500 INJECTION, SOLUTION INTRAVENOUS at 16:27

## 2023-04-26 RX ADMIN — NOREPINEPHRINE BITARTRATE 6 MCG/MIN: 1 INJECTION, SOLUTION, CONCENTRATE INTRAVENOUS at 08:09

## 2023-04-26 RX ADMIN — VANCOMYCIN HYDROCHLORIDE 1000 MG: 1 INJECTION, SOLUTION INTRAVENOUS at 12:58

## 2023-04-26 RX ADMIN — IPRATROPIUM BROMIDE 0.5 MG: 0.5 SOLUTION RESPIRATORY (INHALATION) at 07:12

## 2023-04-26 RX ADMIN — IPRATROPIUM BROMIDE 1 MG: 0.5 SOLUTION RESPIRATORY (INHALATION) at 17:13

## 2023-04-26 RX ADMIN — MICONAZOLE NITRATE 1 APPLICATION.: 20 CREAM TOPICAL at 17:32

## 2023-04-26 RX ADMIN — LACTULOSE 20 G: 20 SOLUTION ORAL at 08:03

## 2023-04-26 RX ADMIN — DEXTROSE AND SODIUM CHLORIDE 75 ML/HR: 5; .45 INJECTION, SOLUTION INTRAVENOUS at 01:43

## 2023-04-26 RX ADMIN — DEXTROSE AND SODIUM CHLORIDE 125 ML/HR: 5; .9 INJECTION, SOLUTION INTRAVENOUS at 17:43

## 2023-04-26 RX ADMIN — LEVALBUTEROL HYDROCHLORIDE 1.25 MG: 1.25 SOLUTION, CONCENTRATE RESPIRATORY (INHALATION) at 18:47

## 2023-04-26 RX ADMIN — DEXTROSE AND SODIUM CHLORIDE 75 ML/HR: 5; .9 INJECTION, SOLUTION INTRAVENOUS at 08:51

## 2023-04-26 RX ADMIN — IPRATROPIUM BROMIDE 1 MG: 0.5 SOLUTION RESPIRATORY (INHALATION) at 17:10

## 2023-04-26 RX ADMIN — INSULIN LISPRO 3 UNITS: 100 INJECTION, SOLUTION INTRAVENOUS; SUBCUTANEOUS at 17:50

## 2023-04-26 RX ADMIN — B-COMPLEX W/ C & FOLIC ACID TAB 1 TABLET: TAB at 08:04

## 2023-04-26 RX ADMIN — CALCIUM GLUCONATE 2 G: 20 INJECTION, SOLUTION INTRAVENOUS at 00:43

## 2023-04-26 RX ADMIN — FOMEPIZOLE 800 MG: 1 INJECTION, SOLUTION INTRAVENOUS at 05:10

## 2023-04-26 NOTE — DISCHARGE INSTR - OTHER ORDERS
Cleanse b/l sacro-buttocks, groin, and perineum with soap and water, pat dry, and apply ANSHU antifungal cream BID

## 2023-04-26 NOTE — RESPIRATORY THERAPY NOTE
RT Ventilator Management Note  Dari Ruiz 63 y o  male MRN: [id number]  Unit/Bed#: MICU 08 Encounter: [id number]      Daily Screen         4/26/2023  4374             Patient safety screen outcome[de-identified] Failed    Spont breathing trial % for 30 min: No              Physical Exam:   Assessment Type: During-treatment  General Appearance: Alert, Awake  Respiratory Pattern: Dyspnea at rest  Chest Assessment: Chest expansion symmetrical  O2 Device: nc      Resp Comments: (P) Pt intubated by Dr Calvillo Rad  Pt bagged at 100%  +etco2 Bilat BS   Endotube secured at 22cm

## 2023-04-26 NOTE — CONSULTS
Consultation - [address] [facility] 63 y o  male MRN: [id number]  [facility]: [id number]    Assessment:  Irritant contact dermatitis d/t dual incontinence   Cutaneous candidiasis   Ambulatory dysfunction     Plan:  • MASD/IAD with partial thickness skin loss with candidiasis present to the b/l sacro-buttocks, perineum and groin  Will recommend ANSHU antifungal cream  Incontinence is being managed by rectal tube and hernandez  • Will recommend preventative nursing skin care measures  • Pressure relief  • Low air loss critical care mattress in place   • Tiger text wound care team with questions or concerns  • Routine wound care follow-up while admitted  AVS updated  History of Present Illness:  Patient is a 80-year-old male who was admitted to the medical ICU with shortness of breath  Patient has a history of stage IV diabetes, alcohol abuse, tobacco use, Crohn's disease, HTN, CKD, CAD, and anxiety  Wound care consulted for MASD to sacrum and perineal area  Patient is rule out c diff  Patient seen in bed on low air loss critical care mattress, awake and alert with irritiability, able to follow commands with cueing  Patient seen with the primary RN  Patient recently medicated with phenobarbital per primary RN  Minimal assist of 1 with turning for the assessment  Rectal tube, hernandez, and NG tubes in place  Incontinent of bowel and bladder per nursing  Care dependent  Patient is currently on vasopressors  Nurse reports skin alterations present on admission and patient was noted to be soiled per EMS report  No reported fever or chills         Subjective:    Review of Systems   Unable to perform ROS: Mental status change       Historical Information   Past Medical History:   Diagnosis Date   • Anxiety    • Asthma    • Cardiac disease    • Cervical stenosis (uterine cervix)    • Chest pain    • Chronic kidney disease    • Colitis    • Colon polyps    • COPD (chronic obstructive pulmonary disease) ([facility] )     2L @ HS and PRN   • Coronary artery disease    • Diverticulitis    • Esophageal reflux    • 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HCC)    • Hyperlipidemia    • Hypertension    • IBD (inflammatory bowel disease)    • Myocardial infarction [facility])    • Old myocardial infarction    • Perforation of colon (HCC)    • Type 2 diabetes, diet controlled ([facility] )    • Ulcerative colitis ([facility] )      Past Surgical History:   Procedure Laterality Date   • ANGIOPLASTY     • APPENDECTOMY     • COLON SURGERY     • COLONOSCOPY N/A 10/24/2016    Procedure: COLONOSCOPY;  Surgeon: Moisés Cherry MD;  [facility]; Service:    • CORONARY ANGIOPLASTY WITH STENT PLACEMENT     • ESOPHAGOGASTRODUODENOSCOPY N/A 10/24/2016    Procedure: ESOPHAGOGASTRODUODENOSCOPY (EGD); Surgeon: Moisés Cherry MD;  [facility]; Service:    • EXPLORATORY LAPAROTOMY W/ BOWEL RESECTION N/A 5/22/2018    Procedure: EXPLORATORY LAPAROTOMY, ILIOCOLECTOMY, ILIOCOLONIC ANASTAMOSIS, LOOP ILIOSTOMY, REPAIR OF SEROSAL TEAR, EXTENSIVE LYSIS OF ADHESIONS, WOUND VAC PLACEMENT;  Surgeon: Moisés Cherry MD;  [facility];  Service: Colorectal   • HEMICOLECTOMY     • ILEOSTOMY CLOSURE N/A 10/5/2018    Procedure: Ancel [place];  Surgeon: Moisés Cehrry MD;  [facility];  Service: Colorectal   • OTHER SURGICAL HISTORY      stent indications acute myocardial infarction   • ME COLONOSCOPY FLX DX W/COLLJ SPEC WHEN PFRMD N/A 2/6/2018    Procedure: COLONOSCOPY;  Surgeon: Moisés Cherry MD;  [facility]; Service: Colorectal   • ME COLONOSCOPY FLX DX W/COLLJ SPEC WHEN PFRMD N/A 10/4/2018    Procedure: COLONOSCOPY;  Surgeon: Moisés Cherry MD;  [facility];   Service: Colorectal   • TONSILLECTOMY       Social History   Social History     Substance and Sexual Activity   Alcohol Use Yes     Social History     Substance and Sexual Activity   Drug Use No     E-Cigarette/Vaping   • E-Cigarette Use Never User E-Cigarette/Vaping Substances   • Nicotine No    • THC No    • CBD No    • Flavoring No    • Other No    • Unknown No      Social History     Tobacco Use   Smoking Status Every Day   • Packs/day: 3 00   • Years: 40 00   • Pack years: 120 00   • Types: Cigars, Cigarettes   • Start date: 1   Smokeless Tobacco Never   Tobacco Comments    4-5cigars per day     Family History:   Family History   Problem Relation Age of Onset   • Coronary artery disease Father    • Crohn's disease Father    • Stroke Father    • Diabetes Family        Meds/Allergies   current meds:   Current Facility-Administered Medications   Medication Dose Route Frequency   • acetaminophen (TYLENOL) tablet 650 mg  650 mg Oral Q6H PRN   • albuterol inhalation solution 2 5 mg  2 5 mg Nebulization Q4H PRN   • cefepime (MAXIPIME) 1,000 mg in dextrose 5 % 50 mL IVPB  1,000 mg Intravenous Q12H   • chlorhexidine (PERIDEX) 0 12 % oral rinse 15 mL  15 mL Mouth/Throat Q12H [facility]   • dextrose 5 % and sodium chloride 0 9 % infusion  125 mL/hr Intravenous Continuous   • folic acid (FOLVITE) tablet 1,000 mcg  1,000 mcg Oral Daily   • insulin lispro (HumaLOG) 100 units/mL subcutaneous injection 1-6 Units  1-6 Units Subcutaneous Q6H [address]   • ipratropium (ATROVENT) 0 02 % inhalation solution 0 5 mg  0 5 mg Nebulization TID   • lactulose oral solution 20 g  20 g Oral BID   • levalbuterol (XOPENEX) inhalation solution 1 25 mg  1 25 mg Nebulization TID   • methylPREDNISolone sodium succinate (Solu-MEDROL) injection 40 mg  40 mg Intravenous Q12H [facility]   • metroNIDAZOLE (FLAGYL) IVPB (premix) 500 mg 100 mL  500 mg Intravenous Q8H   • moisture barrier miconazole 2% cream (aka ANSHU MOISTURE BARRIER ANTIFUNGAL CREAM)   Topical BID   • multivitamin stress formula tablet 1 tablet  1 tablet Oral Daily   • nicotine (NICODERM CQ) 21 mg/24 hr TD 24 hr patch 21 mg  21 mg Transdermal Daily   • norepinephrine (LEVOPHED) 4 mg (STANDARD CONCENTRATION) IV in sodium chloride 0 9% 250 mL "1-30 mcg/min Intravenous Titrated   • pantoprazole (PROTONIX) injection 40 mg  40 mg Intravenous Q12H [address]   • phenol (CHLORASEPTIC) 1 4 % mucosal liquid 1 spray  1 spray Mouth/Throat Q2H PRN   • thiamine (VITAMIN B1) 500 mg in sodium chloride 0 9 % 50 mL IVPB  500 mg Intravenous TID   • [START ON 4/29/2023] thiamine tablet 100 mg  100 mg Oral Daily   • vancomycin (VANCOCIN) IVPB (premix in dextrose) 1,000 mg 200 mL  15 mg/kg Intravenous Daily PRN   • vasopressin (PITRESSIN) 20 Units in sodium chloride 0 9 % 100 mL infusion  0 04 Units/min Intravenous Continuous     Allergies   Allergen Reactions   • Medical Tape    • Other      Brown cloth band aids      • Latex Rash       Objective   Vitals: Blood pressure 100/57, pulse 100, temperature 97 9 °F (36 6 °C), resp  rate 20, height 5' 6\" (1 676 m), weight 76 7 kg (169 lb), SpO2 99 %  Wounds:     Wound 04/25/23  Perineum (Active)   Wound Image       04/26/23 1330   Wound Length (cm) 5 cm 04/26/23 1330   Wound Width (cm) 2 cm 04/26/23 1330   Wound Depth (cm) 0 1 cm 04/26/23 1330   Wound Surface Area (cm^2) 10 cm^2 04/26/23 1330   Wound Volume (cm^3) 1 cm^3 04/26/23 1330   Calculated Wound Volume (cm^3) 1 cm^3 04/26/23 1330         Physical Exam  Constitutional:       General: He is not in acute distress  Appearance: He is ill-appearing  He is not diaphoretic  HENT:      Head: Normocephalic and atraumatic  Right Ear: External ear normal       Left Ear: External ear normal       Nose:      Comments: NG tube   Eyes:      Conjunctiva/sclera: Conjunctivae normal    Pulmonary:      Effort: Pulmonary effort is normal  No respiratory distress  Genitourinary:     Comments: Hernandez and rectal tube  Skin:     General: Skin is warm and dry  Findings: Rash and wound present  Rash is macular and papular  Comments: 1  Candidiasis rash noted to the b/l sacro-buttocks, perineum and groin   Partial thickness skin loss with 100% moist pink tissue to the L " "buttock aspect  Small amount of bloody drainage  Otherwise, the skin to the above locations is fragile, denuded, blanchable and intact  2  B/l heels are dry and intact without redness or wounds  No induration, fluctuance, odor, warmth/temperature differences, or purulence noted to the above mentioned wounds and skin areas assessed  Patient tolerated assessment well- no s/s of non-verbal pain or discomfort observed during the encounter  Neurological:      Mental Status: He is alert  Gait: Gait abnormal            Lab, Imaging and other studies: I have personally reviewed pertinent reports  Code Status: Level 1 - Full Code      Counseling / Coordination of Care  Total time spent today:    Total time (face-to-face and non-face-to-face) spent on today's visit was 20 minutes  This includes preparation for the visits (H&P on 4/25/23 and critical care note on 4/26/23) performance of a medically appropriate history and examination, and orders for medications/treatments or testing  Discussed assessment findings, and plan of care/recommendations with patients CANDICE Burger, AMALIA, ALFONSO      Portions of the record may have been created with voice recognition software  Occasional wrong word or \"sound a like\" substitutions may have occurred due to the inherent limitations of voice recognition software    Read the chart carefully and recognize, using context, where substitutions have occurred      "

## 2023-04-26 NOTE — RESPIRATORY THERAPY NOTE
RT Ventilator Management Note  Haritha Damian 63 y o  male MRN: [id number]  [facility]: [id number]      Daily Screen         4/26/2023  6982             Patient safety screen outcome[de-identified] Failed (P)     Spont breathing trial % for 30 min: No (P)               Physical Exam:   Assessment Type: During-treatment  General Appearance: Alert, Awake  Respiratory Pattern: Dyspnea at rest  Chest Assessment: Chest expansion symmetrical  O2 Device: nc      Resp Comments: called to give chauhan neb

## 2023-04-26 NOTE — CONSULTS
Consultation - Palliative and Supportive Care   Natalie Rivera 63 y o  male [id number]    Patient Active Problem List   Diagnosis   • Crohn disease [facility])   • Spinal stenosis of cervical region   • Pericarditis   • Coronary artery disease involving native coronary artery of native heart without angina pectoris   • Atherosclerosis of coronary artery   • Stage 4 very severe COPD by GOLD classification ([facility] [facility] )   • Dyslipidemia   • Lactic acidosis   • High anion gap metabolic acidosis   • Hyponatremia   • Alcohol abuse   • Cigarette nicotine dependence without complication   • Agitation   • Heart failure, systolic, due to idiopathic cardiomyopathy (HCC)   • Moderate protein-calorie malnutrition (HCC)   • Macrocytic anemia   • Hypocalcemia   • Perforation of colon (HCC)   • Hypotension   • DEBBY (acute kidney injury) ([facility] )   • Presence of drug-eluting stent in right coronary artery   • Paroxysmal atrial fibrillation (HCC)   • Hypercalcemia   • Hematemesis with nausea   • Status post reversal of ileostomy   • Screening for diabetes mellitus   • Shock ([facility] )   • Acute respiratory failure with hypoxia (HCC)   • End stage COPD ([facility] )   • Hyperglycemia   • Abnormal liver function   • Hyperammonemia ([facility] )     Active issues specifically addressed today include:   End stage COPD   CKD stage 3  CAD  Alcohol abuse  Shock state  Acute Respiratory failure   GOC and supportive care     Plan:  1  Symptom management - Per Primary team       2  Goals   -Palliative Care  will speak to patient's daughter Phillip Torres via phone regarding Palliative and Supportive care, unfortunately I will not be available for this phone call  Update regarding this discussion to follow     -On my examination today,  patient was not able to tell me where he  was, why he was in the hospital or his understanding of his medical conditions  Per chart review he lives at home with his daughter Phillip Torres        Code Status: Full  - Level 1   Decisional apparatus: Patient is not competent on my exam today  If competence is lost, patient's substitute decision maker would default to his adult children by [place]  Advance Directive / Living Will / POLST:       I have reviewed the patient's controlled substance dispensing history in the Prescription Drug Monitoring Program in compliance with the [facility] regulations before prescribing any controlled substances  We appreciate the invitation to be involved in this patient's care  We will continue to follow  Please do not hesitate to reach our on call provider through our clinic answering service at  should you have acute symptom control concerns  Shasta Yen MD  Palliative and Supportive Care  Clinic/Answering Service: [phone]  You can find me on [employer]! IDENTIFICATION:  Inpatient consult to Palliative Care  Consult performed by: Shasta Yen MD  Consult ordered by: Jimbo Mitchell MD        Physician Requesting Consult: Olive Salomon MD  Reason for Consult / Principal Problem:   Hx and PE limited by:sedated and intubated     HISTORY OF PRESENT ILLNESS:       Fili Erickson is a 63 y o  male with a pertinent Pmhx of end-stage COPD, coronary artery disease, MI status post PCI in 2012, alcohol abuse who presented on 04/25 due to progressively worsening shortness of breath  On admission he was hemodynamically unstable requring ICU admission for shock (unclear etiology at this time) and Acute respiratory failure requiring HFNC  Additionally, he is currently on broad spectrum antibiotics and pressor (levophed)  Palliative care has been consulted for [address], and supportive care  Review of Systems   Constitutional: Negative for fever, malaise/fatigue and night sweats  HENT: Negative for congestion, ear discharge, ear pain, hearing loss and hoarse voice  Eyes: Negative for discharge, double vision, pain and photophobia  Cardiovascular: Positive for dyspnea on exertion  Negative for chest pain, leg swelling and palpitations  Respiratory: Positive for shortness of breath  Negative for cough, hemoptysis, sleep disturbances due to breathing, snoring and wheezing  Gastrointestinal: Negative for bloating, abdominal pain, constipation, diarrhea, dysphagia, nausea and vomiting  Genitourinary: Positive for urgency  Neurological: Negative for headaches and light-headedness  Psychiatric/Behavioral: Negative for altered mental status and depression  Past Medical History:   Diagnosis Date   • Anxiety    • Asthma    • Cardiac disease    • Cervical stenosis (uterine cervix)    • Chest pain    • Chronic kidney disease    • Colitis    • Colon polyps    • COPD (chronic obstructive pulmonary disease) (HCC)     2L @ HS and PRN   • Coronary artery disease    • Diverticulitis    • Esophageal reflux    • 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HCC)    • Hyperlipidemia    • Hypertension    • IBD (inflammatory bowel disease)    • Myocardial infarction [facility])    • Old myocardial infarction    • Perforation of colon ([facility])    • Type 2 diabetes, diet controlled ([facility] )    • Ulcerative colitis ([facility] )      Past Surgical History:   Procedure Laterality Date   • ANGIOPLASTY     • APPENDECTOMY     • COLON SURGERY     • COLONOSCOPY N/A 10/24/2016    Procedure: COLONOSCOPY;  Surgeon: Kathy David MD;  Location: [facility]; Service:    • CORONARY ANGIOPLASTY WITH STENT PLACEMENT     • ESOPHAGOGASTRODUODENOSCOPY N/A 10/24/2016    Procedure: ESOPHAGOGASTRODUODENOSCOPY (EGD); Surgeon: Kathy David MD;  Location: [facility];   Service:    • EXPLORATORY LAPAROTOMY W/ BOWEL RESECTION N/A 5/22/2018    Procedure: EXPLORATORY LAPAROTOMY, ILIOCOLECTOMY, ILIOCOLONIC ANASTAMOSIS, LOOP ILIOSTOMY, REPAIR OF SEROSAL TEAR, EXTENSIVE LYSIS OF ADHESIONS, WOUND VAC PLACEMENT;  Surgeon: Kathy David MD;  [facility];  Service: Colorectal   • HEMICOLECTOMY     • ILEOSTOMY CLOSURE N/A 10/5/2018    Procedure: CLOSURE ILEOSTOMY;  Surgeon: Ligia García MD;  [facility];  Service: Colorectal   • OTHER SURGICAL HISTORY      stent indications acute myocardial infarction   • SD COLONOSCOPY FLX DX W/COLLJ SPEC WHEN PFRMD N/A 2/6/2018    Procedure: COLONOSCOPY;  Surgeon: Ligia García MD;  [facility]; Service: Colorectal   • SD COLONOSCOPY FLX DX W/COLLJ SPEC WHEN PFRMD N/A 10/4/2018    Procedure: COLONOSCOPY;  Surgeon: Ligia García MD;  [facility]; Service: Colorectal   • TONSILLECTOMY       Social History     Socioeconomic History   • Marital status:      Spouse name: Not on file   • Number of children: Not on file   • Years of education: Not on file   • Highest education level: Not on file   Occupational History   • Not on file   Tobacco Use   • Smoking status: Every Day     Packs/day: 3 00     Years: 40 00     Pack years: 120 00     Types: Cigars, Cigarettes     Start date: 1978   • Smokeless tobacco: Never   • Tobacco comments:     4-5cigars per day   Vaping Use   • Vaping Use: Never used   Substance and Sexual Activity   • Alcohol use: Yes   • Drug use: No   • Sexual activity: Yes     Partners: Female     Birth control/protection: None   Other Topics Concern   • Not on file   Social History Narrative   • Not on file     Social Determinants of Health     Financial Resource Strain: Not on file   Food Insecurity: No Food Insecurity   • Worried About Running Out of Food in the Last Year: Never true   • Ran Out of Food in the Last Year: Never true   Transportation Needs: No Transportation Needs   • Lack of Transportation (Medical): No   • Lack of Transportation (Non-Medical):  No   Physical Activity: Not on file   Stress: Not on file   Social Connections: Not on file   Intimate Partner Violence: Not on file   Housing Stability: Unknown   • Unable to Pay for Housing in the Last Year: No   • Number of Places Lived in the Last Year: Not on file   • Unstable Housing in the Last Year: No     Family History   Problem Relation Age of Onset   • Coronary artery disease Father    • Crohn's disease Father    • Stroke Father    • Diabetes Family        MEDICATIONS / ALLERGIES:    all current active meds have been reviewed    Allergies   Allergen Reactions   • Medical Tape    • Other      Brown cloth band aids      • Latex Rash       OBJECTIVE:    Physical Exam  Physical Exam  Vitals and nursing note reviewed  Constitutional:       General: He is awake  He is not in acute distress  Appearance: He is ill-appearing  HENT:      Head: Normocephalic and atraumatic  Eyes:      Conjunctiva/sclera: Conjunctivae normal    Cardiovascular:      Rate and Rhythm: Normal rate and regular rhythm  Pulses: Normal pulses  Heart sounds: Normal heart sounds  No murmur heard  Pulmonary:      Effort: Pulmonary effort is normal  No respiratory distress  Breath sounds: Normal breath sounds  Abdominal:      Palpations: Abdomen is soft  There is no mass  Tenderness: There is no abdominal tenderness  Hernia: No hernia is present  Musculoskeletal:         General: No swelling  Cervical back: Neck supple  Skin:     General: Skin is warm and dry  Capillary Refill: Capillary refill takes less than 2 seconds  Psychiatric:         Mood and Affect: Mood normal          Lab Results:   I have personally reviewed pertinent labs  , CBC:   Lab Results   Component Value Date    WBC 8 81 04/27/2023    HGB 7 2 (L) 04/27/2023    HCT 21 3 (L) 04/27/2023     (H) 04/27/2023    PLT 83 (L) 04/27/2023    MCH 33 8 04/27/2023    MCHC 33 8 04/27/2023    RDW 14 1 04/27/2023    MPV 9 6 04/27/2023    NRBC 1 04/27/2023   , CMP:   Lab Results   Component Value Date    SODIUM 130 (L) 04/27/2023    K 3 8 04/27/2023    CL 99 04/27/2023    CO2 21 04/27/2023    BUN 31 (H) 04/27/2023    CREATININE 2 08 (H) 04/27/2023    CALCIUM 7 7 (L) 04/27/2023     (H) 04/27/2023    ALT 53 04/27/2023 ALKPHOS 158 (H) 04/27/2023    EGFR 33 04/27/2023     Imaging Studies:   CTA dissection protocol chest abdomen pelvis w wo contrast    Result Date: 4/25/2023  Impression 1  No aortic dissection, intramural hematoma, or aortic aneurysm  Patent mesenteric vessels  2  Circumferential mural thickening of the esophagus with an air-fluid level, which may relate to esophagitis and esophageal dysmotility  3  Hepatomegaly with severe hepatic steatosis  I personally discussed this study with Dr Scott Hameed on 4/25/2023 1:15 PM at 4/25/2023 1:36 PM  Workstation performed: [id number]     EKG, Pathology, and Other Studies: EKG demonstrating sinus tachycardia     Counseling / Coordination of Care    Total floor / unit time spent today 30+ minutes  Greater than 50% of total time was spent with the patient and / or family counseling and / or coordination of care  A description of the counseling / coordination of care: This note was shared with the patient

## 2023-04-26 NOTE — CASE MANAGEMENT
Case Management Assessment & Discharge Planning Note    Patient name Haritha [facility] MICU 08/MICU 46 MRN [id number]  : 1960 Date 2023       Current Admission Date: 2023  Current Admission Diagnosis:Shock [facility])   Patient Active Problem List    Diagnosis Date Noted   • Shock ([facility] ) 2023   • Acute respiratory failure with hypoxia ([facility] ) 2023   • End stage COPD ([facility] ) 2023   • Hyperglycemia 2023   • Abnormal liver function 2023   • Hyperammonemia ([facility] ) 2023   • Screening for diabetes mellitus 2019   • Status post reversal of ileostomy 10/25/2018   • Hypercalcemia 2018   • Hematemesis with nausea 2018   • Presence of drug-eluting stent in right coronary artery 2018   • Paroxysmal atrial fibrillation ([facility] ) 2018   • DEBBY (acute kidney injury) ([facility] ) 2018   • Hypotension 2018   • Macrocytic anemia 2018   • Hypocalcemia 2018   • Perforation of colon (HCC)    • Moderate protein-calorie malnutrition ([facility] ) 2018   • Heart failure, systolic, due to idiopathic cardiomyopathy ([facility] ) 2018   • Agitation 2018   • Lactic acidosis 05/15/2018   • High anion gap metabolic acidosis    • Hyponatremia 05/15/2018   • Alcohol abuse 05/15/2018   • Cigarette nicotine dependence without complication    • Stage 4 very severe COPD by GOLD classification ([place] [address] ) 2017   • Spinal stenosis of cervical region 2016   • Pericarditis 2016   • Coronary artery disease involving native coronary artery of native heart without angina pectoris 2016   • Crohn disease ([facility] ) 10/22/2016   • Atherosclerosis of coronary artery 2016   • Dyslipidemia 10/19/2013      LOS (days): 1  Geometric Mean LOS (GMLOS) (days):   Days to GMLOS:     OBJECTIVE:    Risk of Unplanned Readmission Score: 26 55         Current admission status: Inpatient       Preferred Pharmacy:   [id number] - [place], [address]  Phone: [phone] Fax: [phone]    Primary Care Provider: Joselito Barahona DO    Primary Insurance: [facility]  Secondary Insurance:     ASSESSMENT:  Active Health Care Proxies    There are no active Health Care Proxies on file  Readmission Root Cause  30 Day Readmission: No    Patient Information  Admitted from[de-identified] Home  Mental Status: Confused  During Assessment patient was accompanied by: Not accompanied during assessment  Assessment information provided by[de-identified] Daughter  Primary Caregiver: Self  Support Systems: [address] Dr of Residence: [address] [place] [address] do you live in?: [employer] entry access options   Select all that apply : No steps to enter home  Type of Current Residence: 2 story home  Upon entering residence, is there a bedroom on the main floor (no further steps)?: No  A bedroom is located on the following floor levels of residence (select all that apply)::  (patient sleeps on recliner)  Upon entering residence, is there a bathroom on the main floor (no further steps)?: No  Indicate which floors of current residence have a bathroom (select all the apply):: 2nd Floor  Number of steps to 2nd floor from main floor: One Flight  In the last 12 months, was there a time when you were not able to pay the mortgage or rent on time?: No  In the last 12 months, was there a time when you did not have a steady place to sleep or slept in a shelter (including now)?: No  Homeless/housing insecurity resource given?: N/A  Living Arrangements: Lives w/ Daughter  Is patient a ?: No    Activities of Daily Living Prior to Admission  Functional Status: Independent  Completes ADLs independently?: Yes  Ambulates independently?: Yes (however has difficulty climbing stairs)  Does patient use assisted devices?: Yes  Assisted Devices (DME) used: Bedside Commode  Does patient currently own DME?: Yes  What DME does the patient currently own?: Bedside Commode  Does patient have a history of Outpatient Therapy (PT/OT)?: Yes  Does the patient have a history of Short-Term Rehab?: No  Does patient have a history of HHC?: Yes  Does patient currently have [facility]?: No         Patient Information Continued  Income Source: Unemployed  Within the past 12 months, you worried that your food would run out before you got the money to buy more : Never true  Within the past 12 months, the food you bought just didn't last and you didn't have money to get more : Never true  Food insecurity resource given?: N/A  Does patient receive dialysis treatments?: No  Does patient have a history of substance abuse?: Yes  Historical substance use preference: Alcohol/ETOH  History of Withdrawal Symptoms: Delirium tremors, Hallucinations  Is patient currently in treatment for substance abuse?: No  Treatment options provided  Does patient have a history of Mental Health Diagnosis?: No         Means of Transportation  Means of Transport to Appts[de-identified] Drives Self  In the past 12 months, has lack of transportation kept you from medical appointments or from getting medications?: No  In the past 12 months, has lack of transportation kept you from meetings, work, or from getting things needed for daily living?: No  Was application for public transport provided?: N/A        DISCHARGE DETAILS:    Discharge planning discussed with[de-identified] patient's daughter Phillip Torres via phone  Freedom of Choice: Yes     CM contacted family/caregiver?: Yes  Were Treatment Team discharge recommendations reviewed with patient/caregiver?: Yes  Did patient/caregiver verbalize understanding of patient care needs?: Yes  Were patient/caregiver advised of the risks associated with not following Treatment Team discharge recommendations?: Yes    Contacts  Patient Contacts: Brenda Jericho, daughter  Relationship to Patient[de-identified] Family  Contact Method: Phone  Phone Number: [phone]  Reason/Outcome: Continuity of "Care, Emergency Contact, Discharge Planning              Other Referral/Resources/Interventions Provided:  Interventions: Substance Abuse Treatment  Government Services[de-identified] [facility] on [facility]         Treatment Team Recommendation: Other (TBD)  Discharge Destination Plan[de-identified] Other (TBD -- awaiting recommendations, likely substance abuse treatment)  Transport at Discharge : Other (Comment) (TBD)             Patient/caregiver received discharge checklist   Content reviewed  Patient/caregiver encouraged to participate in discharge plan of care prior to discharge home  CM reviewed d/c planning process including the following: identifying help at home, patient preference for d/c planning needs, Discharge Lounge, [facility] Meds to Bed program, availability of treatment team to discuss questions or concerns patient and/or family may have regarding understanding medications and recognizing signs and symptoms once discharged  CM also encouraged patient to follow up with all recommended appointments after discharge  Patient advised of importance for patient and family to participate in managing patient’s medical well being  Additional Comments: Introduced self and role to patient's daughter Meenu Hodgson via phone  Patient lives with daughter and her 3 children  Sleeps in living room recliner, bathroom upstairs but uses bedside commode on first level due to difficulty navigating the stairs  Daughter states patient drinks alcohol daily \"from when he gets up in the morning until when he goes to bed  \"  Daughter would like to see her father undergo substance abuse treatment but is unsure if he will agree  EMS form indicated a living situation which included feces strewn around the room, infestation of flies, and limited space for movement due to clutter  Call placed to [address] [place] [address] [phone], report made due to patient's age    CM will continue to follow and place referrals as " appropriate

## 2023-04-26 NOTE — OCCUPATIONAL THERAPY NOTE
OT CANCEL NOTE    OT orders received  Chart reviewed  Pt is not medically appropriate @ this time  Will hold initial OT evaluation  Will continue to follow pt on caseload and see pt when medically stable and as clinically appropriate  04/26/23 1046   OT Last Visit   OT Visit Date 04/26/23   Note Type   Note type Evaluation; Cancelled Session   Cancel Reasons Medical status         Clance Hipps MS, OTR/L

## 2023-04-26 NOTE — PROGRESS NOTES
Vancomycin IV Pharmacy-to-Dose Consultation    Fili Erickson is a 63 y o  male who is currently receiving Vancomycin IV with management by the Pharmacy Consult service  Relevant clinical data and objective / subjective history reviewed  Vancomycin Assessment:  Indication: concern for septic shock  Status: critically ill, leukocytosis, tmax 99  Micro:   4/25 MRSA culture: in process  4/25 Urine culture: in process  4/25 Blood cultures x 2: in process  Procalcitonin: 1 73  Renal Function: DEBBY, sCr 2 5 (peaked at 2 83, baseline 0 9-1 2); UOP 0 3 mL/kg/hr  Days of Therapy: 2  Current Dose: 2000 mg IV x 1 (last dose prior to level: 4/25 at 1343)  Goal AUC(24h)/Trough: 400-600, trough >10  Last Level: 19 5 (4/26 at 0429) 14 8 hours after loading dose    Vancomycin Plan:  New Dosing: change to 1000 mg IV daily PRN when random level is less than 15 (next dose: 4/26 at 1230)  Next Level: Random 4/27 at 0600  Renal Function Monitoring: Daily BMP and [address] will continue to follow closely for s/sx of nephrotoxicity, infusion reactions and appropriateness of therapy  BMP and CBC will be ordered per protocol  We will continue to follow the patient’s culture results and clinical progress daily       Karin Gan, PharmD, [address] Clinical Pharmacist  [phone]

## 2023-04-26 NOTE — UTILIZATION REVIEW
Initial Clinical Review    Admission: Date/Time/Statement:   Admission Orders (From admission, onward)     Ordered        04/25/23 1410  INPATIENT ADMISSION  Once                      Orders Placed This Encounter   Procedures   • INPATIENT ADMISSION     Standing Status:   Standing     Number of Occurrences:   1     Order Specific Question:   Level of Care     Answer:   Critical Care [15]     Order Specific Question:   Estimated length of stay     Answer:   More than 2 Midnights     Order Specific Question:   Certification     Answer:   I certify that inpatient services are medically necessary for this patient for a duration of greater than two midnights  See H&P and MD Progress Notes for additional information about the patient's course of treatment  ED Arrival Information     Expected   -    Arrival   4/25/2023 10:19    Acuity   Emergent            Means of arrival   Ambulance    Escorted by   [facility]    Service   Critical Care/ICU    Admission type   Emergency            Arrival complaint   SOB           Chief Complaint   Patient presents with   • Shortness of Breath     Pt c o increased sob  Initial Presentation: 63 y o  male with PMH of stage 4 COPD, alcohol abuse, tobacco abuse, Crohn's disease s/p ileostomy and reversal in 2018 presented to the ED from home via EMS w/ SOB for a couple of days that worsened this am  EMS found pt disheveled w/ significantly increased WOB, attempted DUOneb on route w/o relief in symptoms  Reports he takes albuterol and ipratropium  Reports drinking several glasses of kenji daily, last drink was last night  In the ED, initially arrived on NRB, placed on HFNC  He was found tachycardic w/ , tachypneic RR 35 and hypotensive BP 82/42  Labs: LA 20 3, WBC 20 57, ammonia 82  CTH neg  CT CAP showed circumferential mural thickening of esophagus with air-fluid level and hepatomegaly with severe hepatic steatosis    On exam, alert, no focal deficit, tachycardia, tachypnea, accessory muscle use, resp distress, capillary refill 2-3 secs, hernia present  Given 1L IVF bolus, Duoneb, IV Cefepime, IV Vanco, started on Levo gtt  Admitted as Inpatient for undifferentiated shock, septic shock, acute hypoxic resp failure, DEBBY, AGMA, hyponatremia, abnormal liver function, hyperammonemia, alc abuse  Plan: Continue cefepime/flagyl, add vanc  Check UA, follow up blood cultures  Trend lactate  Cont IVF  Check cardiac echo  Wean levo, keep MAP >65  Continue bipap  Continue atrovent/xopenex TID  Check BNP  Trend sodium q4hrs and goal is 133 in 24 hours  Place hernandez, monitor UOP  Trend LFTs, check hepatitis panel  Check abdominal ultrasound   Keep NG tube  Check HbA1c, start SSI, keep NPO for now  Start rectal lactulose  Trend H/H  Check UDS, CIWA protocol, started on precedex, continue thiamine and folate  Repeat CMP now  04/25 Med Tox Consult; AGMA, DEBBY, Shock, distributive vs hypovolemic, Hyponatremia, Hypochloremia, Hyperammonemia, Lactatemia, Respiratory failure: The metabolic derangements are most likely a complication of chronic alcohol use manifesting as a severe AKA  recommend dextrose containg fluids along with high dose thiamine 500mg IV every 8 hours for 3 days  start fomepizole empirically and send toxic alcohol testing  Mon closely for alc w/drawal, Phenobarbital doses of 130mg to 260mg at a time can be given  Date: 04/26  Day 2:   Critical Care Notes: Pt transitioned from BIPAP to 6L NC last night  Precedex dc'd  R IJ CVC placed and started on vaso  Received 12 5g 5% albumin and 500ml NS bolus  UOP overnight 250cc  10 kg weight gain  Fomepizole started  On exam, awake, lethargic, abdominal distension, tachycardic, hypotensive, now on 2 5L NC  Na 126, creatinine improving, WBC improved, slightly elevated CPK, serum osm 281, normocytic anemia, thrombocytopenia, INR 1 3, urine Na of <5 and Urine osm of 487  Plan: Increase IVF to 125cc/hr of D5/NS  Trend Na q4hrs with goal of 133  Continue vanc/cef/flagyl  F/up blood and urine cultures  F/up MRSA nares  Send stool enteric panel  Wean pressors as able  Use right leg for BP titration  Received additional phenobarb today  Continue CIWA protocol  F/up tox alcohols  Continue femopizole  Hold lactulose for now  Trend H/H  Continue nebs, decrease solumedrol to 40mg q12  Check LFTs  Monitor UOP, maintain hernandez for now  Med Tox notes: Pt to be progressing to delirium tremens with overall etiology of presentation being consistent with severe alcohol use disorder resulting in hyponatremia, pancreatitis, dehydration, hemorrhagic gastritis, alcoholic ketoacidosis, and encephalopathy  Cont fomepizole  Cont aggressive alc w/drawal mgt  recommend phenobarbital 650 mg now followed by 130 mg hourly until heart rate under 100  Hold for any sedation  Total phenobarbital dosing recommended would be a maximum of 2 to 2 5 g   If reaching 2 g and in need of additional treatment, adjunctive measures could include dexmedetomidine and ketamine, as needed benzodiazepines   consider thiamine 500 mg tid x 3 days then 100 mg tid for 3 days and then 100 mg daily     Seems   ED Triage Vitals   Temperature Pulse Respirations Blood Pressure SpO2   04/25/23 1030 04/25/23 1030 04/25/23 1030 04/25/23 1100 04/25/23 1030   97 6 °F (36 4 °C) (!) 126 (!) 35 (!) 82/42 94 %      Temp Source Heart Rate Source Patient Position - Orthostatic VS BP Location FiO2 (%)   04/25/23 2000 04/25/23 1115 04/25/23 2000 04/25/23 1115 04/25/23 1529   Oral Monitor Lying Left leg 100      Pain Score       04/25/23 1630       No Pain          Wt Readings from Last 1 Encounters:   04/26/23 76 7 kg (169 lb)     Additional Vital Signs:   Date/Time Temp Pulse Resp BP MAP (mmHg) Arterial Line BP MAP SpO2 FiO2 (%) Calculated FIO2 (%) - Nasal Cannula O2 Flow Rate (L/min) Nasal Cannula O2 Flow Rate (L/min) O2 Device O2 Interface Device Patient Position - Orthostatic VS 04/26/23 0900 99 °F (37 2 °C) 106 Abnormal  -- 108/54 69 -- -- 95 % -- -- -- -- -- -- --   04/26/23 0800 99 °F (37 2 °C) 106 Abnormal  -- 100/50 60 Abnormal  -- -- 96 % -- -- -- -- -- -- --   04/26/23 0714 99 °F (37 2 °C) 110 Abnormal  20 93/46 Abnormal  62 Abnormal  -- -- 99 % -- 30 -- 2 5 L/min Nasal cannula -- --   04/26/23 0700 99 °F (37 2 °C) 108 Abnormal  -- 93/46 Abnormal  62 Abnormal  -- -- 99 % -- -- -- -- -- -- --   04/26/23 0600 99 °F (37 2 °C) 112 Abnormal  -- 88/49 Abnormal  73 -- -- 99 % -- -- -- -- -- -- --   04/26/23 0500 99 °F (37 2 °C) 112 Abnormal  -- 112/62 82 -- -- 100 % -- -- -- -- -- -- --   04/26/23 0400 99 °F (37 2 °C) 102 -- 106/63 80 -- -- 100 % -- 44 -- 6 L/min Nasal cannula -- Lying   04/26/23 0300 98 6 °F (37 °C) 118 Abnormal  -- 119/85 107 -- -- 99 % -- -- -- -- -- -- --   04/26/23 0200 98 6 °F (37 °C) 112 Abnormal  -- 132/68 90 -- -- 98 % -- -- -- -- -- -- --   04/26/23 0100 99 °F (37 2 °C) 112 Abnormal  -- -- -- -- -- 96 % -- -- -- -- -- -- --   04/26/23 0000 98 6 °F (37 °C) 114 Abnormal  -- 113/63 81 -- -- 100 % -- -- -- -- Nasal cannula -- Lying   04/25/23 2300 98 6 °F (37 °C) 114 Abnormal  -- 124/70 90 -- -- 100 % -- -- -- -- -- -- --   04/25/23 2200 99 °F (37 2 °C) 118 Abnormal  -- 115/64 78 -- -- 100 % -- -- -- -- -- -- --   04/25/23 2159 -- -- -- -- -- -- -- 100 % -- -- -- -- -- -- --   04/25/23 2100 98 2 °F (36 8 °C) 122 Abnormal  -- 99/73 88 -- -- 98 % -- -- -- -- -- -- --   04/25/23 2000 97 5 °F (36 4 °C) 122 Abnormal  -- 101/74 90 -- -- 98 % -- 44 -- 6 L/min Nasal cannula -- Lying   04/25/23 1902 -- -- -- -- -- -- -- 97 % -- -- -- -- -- -- --   04/25/23 1900 -- 124 Abnormal  -- 74/39 Abnormal  -- -- -- -- -- -- -- -- -- -- --   04/25/23 1844 98 6 °F (37 °C) 124 Abnormal  -- 84/58 Abnormal  74 -- -- 99 % -- -- -- -- -- -- --   04/25/23 1800 98 2 °F (36 8 °C) 118 Abnormal  -- 79/47 Abnormal  61 Abnormal  -- -- 100 % -- -- -- -- -- -- --   04/25/23 1729 98 6 °F (37 °C) 124 Abnormal  -- 116/48 Abnormal  58 Abnormal  -- -- 100 % -- -- -- -- -- -- --   04/25/23 1700 99 7 °F (37 6 °C) 124 Abnormal  30 Abnormal  112/72 86 -- -- 100 % -- -- -- -- -- -- --   04/25/23 1611 -- -- -- -- -- -- -- 92 % -- -- -- -- -- Face mask --   04/25/23 1600 -- 128 Abnormal  -- 107/57 79 102/86 92 mmHg 88 % Abnormal  -- -- -- -- -- -- --   04/25/23 1529 -- -- -- -- -- -- -- -- 100 -- 55 L/min -- High flow nasal cannula HFNC prongs --   04/25/23 1512 -- -- -- -- -- -- -- 98 % -- -- -- -- BiPAP -- --   04/25/23 1400 -- 138 Abnormal  42 Abnormal  100/58 70 -- -- 100 % -- -- -- -- -- -- --   04/25/23 1300 -- 122 Abnormal  39 Abnormal  --  -- -- -- 92 % -- -- -- -- -- -- --   BP: unable to obtain md aware at 04/25/23 1300   04/25/23 1237 -- -- -- -- -- -- -- 100 % -- -- -- -- -- HFNC prongs --   04/25/23 1127 -- -- -- -- -- -- -- -- -- -- -- -- High flow nasal cannula -- --   04/25/23 1121 -- 134 Abnormal  38 Abnormal  97/54 -- -- -- 100 % -- -- -- -- High flow nasal cannula -- --   04/25/23 1115 -- 130 Abnormal  42 Abnormal  107/72 81 -- -- 100 % -- -- -- -- High flow nasal cannula -- --   04/25/23 1101 -- -- -- -- -- -- -- -- -- -- -- -- -- HFNC prongs --   04/25/23 1100 -- 128 Abnormal  38 Abnormal  82/42 Abnormal  -- -- -- 93 % -- -- -- -- -- -- --       Pertinent Labs/Diagnostic Test Results:   XR chest portable ICU   Final Result by Bess Palomino MD (04/26 4635)      No active pulmonary disease  Workstation performed: [id number]         XR chest portable ICU   Final Result by Bess Palomino MD (04/26 0632)      Right IJ line tip has been placed, tip overlies the SVC  No active pulmonary disease  Workstation performed: [id number]         XR abdomen 1 view kub   Final Result by Bess Palomino MD (04/26 4657)      Nasogastric tube tip terminates at the mid stomach  Possible mild small bowel distention in the left mid abdomen, nonspecific   This can be reassessed on follow-up  Workstation performed: [id number]         XR abdomen 1 view kub   Final Result by Cydney Rea MD (04/26 8914)      Nasogastric tube has been inserted, tip seen passing towards the stomach, distal end not fully seen  Workstation performed: [id number]         CT head without contrast   Final Result by Jovon Reina MD (04/25 4357)      No acute intracranial abnormality  Workstation performed: [id number]         CTA dissection protocol chest abdomen pelvis w wo contrast   Final Result by Andrew Mejía MD (04/25 4308)      1  No aortic dissection, intramural hematoma, or aortic aneurysm  Patent mesenteric vessels  2  Circumferential mural thickening of the esophagus with an air-fluid level, which may relate to esophagitis and esophageal dysmotility  3  Hepatomegaly with severe hepatic steatosis  I personally discussed this study with Dr Jose J Ravi on 4/25/2023 1:15 PM at 4/25/2023 1:36 PM       Workstation performed: [id number]         US right upper quadrant    (Results Pending)     04/25   EKG result:Sinus tachycardia  Rightward axis  ST & T wave abnormality, consider inferior ischemia  ST & T wave abnormality, consider anterolateral ischemia    EKG 2: Sinus tachycardia  Minimal voltage criteria for LVH, may be normal variant  Septal infarct , age undetermined  ST & T wave abnormality, consider anterolateral ischemia     04/26 ECHO:  Study Details: This was a technically difficult study  •  Left Ventricle: Left ventricular cavity size is normal  Wall thickness is normal  Systolic function is mildly reduced to low normal by visual estimation in the setting of tachycardia  •  Right Ventricle: Right ventricular cavity size appears mildly dilated  Systolic function is normal   •  Prior TTE study available for comparison  Prior study date: 6/14/2018   No significant changes noted compared to the prior study            Results from last 7 days   Lab Units 04/26/23  0429 04/25/23  1346 04/25/23  1045   WBC Thousand/uL 10 52*  --  20 57*   HEMOGLOBIN g/dL 8 2* 11 4* 12 1   HEMATOCRIT % 24 3* 33 7* 37 7   PLATELETS Thousands/uL 74*  --  155   NEUTROS ABS Thousands/µL 8 98*  --  17 58*         Results from last 7 days   Lab Units 04/26/23  0850 04/26/23  0429 04/26/23  0010 04/25/23  1801 04/25/23  1704   SODIUM mmol/L 126* 125* 125* 126* 133*   POTASSIUM mmol/L 4 1 4 3 4 6 5 1 3 8   CHLORIDE mmol/L 95* 93* 89* 93* 106   CO2 mmol/L 23 21 19* 15* 11*   ANION GAP mmol/L 8 11 17* 18* 16*   BUN mg/dL 33* 33* 36* 34* 27*   CREATININE mg/dL 2 44* 2 50* 2 78* 2 83* 2 03*   EGFR ml/min/1 73sq m 27 26 23 23 34   CALCIUM mg/dL 7 5* 7 6* 7 6* 7 3* 5 4*   CALCIUM, IONIZED mmol/L  --   --  0 94*  --   --    MAGNESIUM mg/dL  --  1 1*  --   --   --    PHOSPHORUS mg/dL  --  2 8  --   --   --      Results from last 7 days   Lab Units 04/26/23  0429 04/25/23  1704 04/25/23  1047 04/25/23  1045   AST U/L  --  147*  --  204*   ALT U/L  --  47  --  66   ALK PHOS U/L  --  168*  --  263*   TOTAL PROTEIN g/dL  --  4 2*  --  6 4   ALBUMIN g/dL  --  1 5*  --  2 1*   TOTAL BILIRUBIN mg/dL  --  1 28*  --  1 10*   AMMONIA umol/L 32  --  82*  --      Results from last 7 days   Lab Units 04/26/23  0543 04/26/23  0049 04/25/23  1743   POC GLUCOSE mg/dl 128 262* 149*     Results from last 7 days   Lab Units 04/26/23  0850 04/26/23  0429 04/26/23  0010 04/25/23  1801 04/25/23  1704 04/25/23  1045   GLUCOSE RANDOM mg/dL 149* 129 226* 154* 118 226*     Results from last 7 days   Lab Units 04/26/23  0429   OSMOLALITY, SERUM mmol/*         No results found for: BETA-HYDROXYBUTYRATE   Results from last 7 days   Lab Units 04/25/23  1802 04/25/23  1430   PH ART  7 286* 7 245*   PCO2 ART mm Hg 38 9 29 2*   PO2 ART mm Hg 296 1* 364 1*   HCO3 ART mmol/L 18 1* 12 4*   BASE EXC ART mmol/L -7 9 -13 5   O2 CONTENT ART mL/dL 17 2 17 6   O2 HGB, ARTERIAL % 99 2* 99 3*   ABG SOURCE  Line, Arterial Artery     Results from last 7 days   Lab Units 04/25/23  1045   PH SANAZ  7 072*   PCO2 SANAZ mm Hg 39 5*   PO2 SANAZ mm Hg 66 5*   HCO3 SANAZ mmol/L 11 2*   BASE EXC ASNAZ mmol/L -18 1   O2 CONTENT SANAZ ml/dL 14 3   O2 HGB, VENOUS % 78 6         Results from last 7 days   Lab Units 04/26/23  0646 04/25/23  1801   CK TOTAL U/L 534* 465*     Results from last 7 days   Lab Units 04/25/23  2130 04/25/23  1346 04/25/23  1112   HS TNI 0HR ng/L  --   --  35   HS TNI 2HR ng/L  --  33  --    HSTNI D2 ng/L  --  -2  --    HS TNI 4HR ng/L 37  --   --    HSTNI D4 ng/L 2  --   --          Results from last 7 days   Lab Units 04/26/23  0429 04/25/23  1045   PROTIME seconds 16 4* 15 5*   INR  1 30* 1 21*   PTT seconds  --  28         Results from last 7 days   Lab Units 04/25/23  1045   PROCALCITONIN ng/ml 1 73*     Results from last 7 days   Lab Units 04/26/23  0850 04/26/23  0543 04/26/23  0327 04/26/23  0010 04/25/23  1704 04/25/23  1627 04/25/23  1430   LACTIC ACID mmol/L 2 6* 4 1* 5 9* 6 6* 10 2* 11 7* 12 9*             Results from last 7 days   Lab Units 04/25/23  1801   NT-PRO BNP pg/mL 3,297*         Results from last 7 days   Lab Units 04/25/23  1704   HEP B S AG  Non-reactive   HEP C AB  Non-reactive   HEP B C IGM  Non-reactive   HEP B C TOTAL AB  Non-reactive     Results from last 7 days   Lab Units 04/26/23  0646 04/25/23  1346   LIPASE u/L 595* 2,090*   AMYLASE IU/L  --  155*             Results from last 7 days   Lab Units 04/26/23  0432 04/26/23  0429   OSMOLALITY, SERUM mmol/KG  --  281*   OSMO UR mmol/  --      Results from last 7 days   Lab Units 04/26/23  0432 04/25/23  1606   CLARITY UA   --  Hazy   COLOR UA   --  Yellow   SPEC GRAV UA   --  1 044*   PH UA   --  5 5   GLUCOSE UA mg/dl  --  Negative   KETONES UA mg/dl  --  Negative   BLOOD UA   --  Moderate*   PROTEIN UA mg/dl  --  100 (2+)*   NITRITE UA   --  Negative   BILIRUBIN UA   --  Small*   UROBILINOGEN UA (BE) mg/dl  --  2 0*   LEUKOCYTES UA   -- Small*   WBC UA /hpf  --  30-50*   RBC UA /hpf  --  None Seen   BACTERIA UA /hpf  --  None Seen   EPITHELIAL CELLS WET PREP /hpf  --  Occasional   MUCUS THREADS   --  Moderate*   SODIUM UR  <5  --                  Results from last 7 days   Lab Units 04/25/23  1346   ETHANOL LVL mg/dL 45*   ACETAMINOPHEN LVL ug/mL <2*   SALICYLATE LVL mg/dL <3*                 Results from last 7 days   Lab Units 04/25/23  1112 04/25/23  1045   BLOOD CULTURE  Received in Microbiology Lab  Culture in Progress  Received in Microbiology Lab  Culture in Progress                 ED Treatment:   Medication Administration from 04/25/2023 1019 to 04/25/2023 1519       Date/Time Order Dose Route Action     04/25/2023 1211 EDT sodium chloride 0 9 % bolus 1,000 mL 0 mL Intravenous Stopped     04/25/2023 1100 EDT sodium chloride 0 9 % bolus 1,000 mL 1,000 mL Intravenous New Bag     04/25/2023 1059 EDT ipratropium-albuterol (DUO-NEB) 0 5-2 5 mg/3 mL inhalation solution 3 mL 3 mL Nebulization Given     04/25/2023 1117 EDT norepinephrine (LEVOPHED) 1 mg/mL injection **ADS Override Pull** --  Override Pull     04/25/2023 1425 EDT norepinephrine (LEVOPHED) 4 mg (STANDARD CONCENTRATION) IV in sodium chloride 0 9% 250 mL 15 mcg/min Intravenous Rate/Dose Change     04/25/2023 1400 EDT norepinephrine (LEVOPHED) 4 mg (STANDARD CONCENTRATION) IV in sodium chloride 0 9% 250 mL 16 mcg/min Intravenous Rate/Dose Change     04/25/2023 1141 EDT norepinephrine (LEVOPHED) 4 mg (STANDARD CONCENTRATION) IV in sodium chloride 0 9% 250 mL 10 mcg/min Intravenous Rate/Dose Change     04/25/2023 1120 EDT norepinephrine (LEVOPHED) 4 mg (STANDARD CONCENTRATION) IV in sodium chloride 0 9% 250 mL 5 mcg/min Intravenous New Bag     04/25/2023 1343 EDT vancomycin (VANCOCIN) 2,000 mg in sodium chloride 0 9 % 500 mL IVPB 2,000 mg Intravenous New Bag     04/25/2023 1325 EDT cefepime (MAXIPIME) 2,000 mg in dextrose 5 % 50 mL IVPB 0 mg Intravenous Stopped     04/25/2023 1255 EDT cefepime (MAXIPIME) 2,000 mg in dextrose 5 % 50 mL IVPB 2,000 mg Intravenous New Bag     04/25/2023 1233 EDT iohexol (OMNIPAQUE) 350 MG/ML injection (SINGLE-DOSE) 100 mL 100 mL Intravenous Given     04/25/2023 1315 EDT lidocaine (PF) (XYLOCAINE-MPF) 1 % injection 5 mL 5 mL Infiltration Given by Other        Past Medical History:   Diagnosis Date   • Anxiety    • Asthma    • Cardiac disease    • Cervical stenosis (uterine cervix)    • Chest pain    • Chronic kidney disease    • Colitis    • Colon polyps    • COPD (chronic obstructive pulmonary disease) (HCC)     2L @ HS and PRN   • Coronary artery disease    • Diverticulitis    • Esophageal reflux    • 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HCC)    • Hyperlipidemia    • Hypertension    • IBD (inflammatory bowel disease)    • Myocardial infarction [facility])    • Old myocardial infarction    • Perforation of colon (HCC)    • Type 2 diabetes, diet controlled ([facility])    • Ulcerative colitis ([facility] )      Present on Admission:  **None**      Admitting Diagnosis: Acidosis [E87 20]  Respiratory failure (HCC) [J96 90]  SOB (shortness of breath) [R06 02]  Lactate blood increase [R79 89]  Transaminitis [R74 01]  Hypoxia [R09 02]  DEBBY (acute kidney injury) ([facility] ) [Y71 0]  Alcoholic cirrhosis of liver without ascites (HCC) [K70 30]  High anion gap metabolic acidosis [W94 04]  Age/Sex: 63 y o  male  Admission Orders:  SCD  PT/OT  Cardio-Pulmonary Monitoring, Neuro Checks, Nursing dysphagia assesment, I/O, Daily weights, Vital signs  CIWA  Restraints non violent   NPO  Aspiration precautions  Seizure precautions    Scheduled Medications:  cefepime, 1,000 mg, Intravenous, Q12H  chlorhexidine, 15 mL, Mouth/Throat, K10I [address]  folic acid, 1,211 mcg, Oral, Daily  insulin lispro, 1-6 Units, Subcutaneous, Q6H JUVENTINO  ipratropium, 0 5 mg, Nebulization, TID  lactulose, 20 g, Oral, BID  levalbuterol, 1 25 mg, Nebulization, TID  methylPREDNISolone sodium succinate, 40 mg, Intravenous, Q12H JUVENTINO  metroNIDAZOLE, 500 mg, Intravenous, Q8H  multivitamin stress formula, 1 tablet, Oral, Daily  nicotine, 21 mg, Transdermal, Daily  pantoprazole, 40 mg, Intravenous, Q12H JUVENTINO  PHENobarbital, 260 mg, Intravenous, Once  PHENobarbital injection 158 mg IV once  thiamine, 500 mg, Intravenous, TID  [START ON 4/29/2023] thiamine, 100 mg, Oral, Daily  vancomycin, 1,000 mg, Intravenous, Once      Continuous IV Infusions:  dextrose 5 % and sodium chloride 0 9 %, 125 mL/hr, Intravenous, Continuous  norepinephrine, 1-30 mcg/min, Intravenous, Titrated  vasopressin, 0 04 Units/min, Intravenous, Continuous  dexmedeTOMIDine (Precedex) 400 mcg in sodium chloride 0 9% 100 mL  Rate: 2-13 8 mL/hr Dose: 0 1-0 7 mcg/kg/hr  Weight Dosing Info: 78 9 kg  Freq: Titrated Route: IV  Last Dose: Stopped (04/25/23 2202)  Start: 04/25/23 1530 End: 04/26/23 0411    PRN Meds:  acetaminophen, 650 mg, Oral, Q6H PRN 04/26 x 1  albuterol, 2 5 mg, Nebulization, Q4H PRN 04/25 x 1, 04/26 x 1  phenol, 1 spray, Mouth/Throat, Q2H PRN 04/26 x 1  vancomycin, 15 mg/kg, Intravenous, Daily PRN        IP CONSULT TO CASE MANAGEMENT  IP CONSULT TO TOXICOLOGY  IP CONSULT TO PHARMACY  IP CONSULT TO PALLIATIVE CARE    Network Utilization Review Department  ATTENTION: Please call with any questions or concerns to [phone] and carefully listen to the prompts so that you are directed to the right person  All voicemails are confidential   Lul Fines all requests for admission clinical reviews, approved or denied determinations and any other requests to dedicated fax number below belonging to the campus where the patient is receiving treatment   List of dedicated fax numbers for the Facilities:  [address] DENIALS (Administrative/Medical Necessity) [phone]   [address] [place] [address] (Maternity/NICU/Pediatrics) Juani Gonzalez [address] [place]  [address] [place] [address] Sarah Bowen [facility] [address] [facility] [address] [facility] [phone]

## 2023-04-26 NOTE — UTILIZATION REVIEW
NOTIFICATION OF INPATIENT ADMISSION   AUTHORIZATION REQUEST   SERVICING FACILITY:   [facility]  Address: [address] [place] [address], [address] [place] [address]  Tax ID: [id number]  NPI: [id number] ATTENDING PROVIDER:  Attending Name and NPI#: Mitch Baker Md [[id number]]  Address: [address] [place] [address]  Phone: [phone]   ADMISSION INFORMATION:  Place of Service: [place] [address]  Place of Service Code: 21  Inpatient Admission Date/Time: 4/25/23  2:11 PM  Discharge Date/Time: No discharge date for patient encounter  Admitting Diagnosis Code/Description:  Acidosis [E87 20]  Respiratory failure (HCC) [J96 90]  SOB (shortness of breath) [R06 02]  Lactate blood increase [R79 89]  Transaminitis [R74 01]  Hypoxia [R09 02]  DEBBY (acute kidney injury) ([facility] ) [N80 1]  Alcoholic cirrhosis of liver without ascites ([facility] ) [K70 30]  High anion gap metabolic acidosis [C27 92]     UTILIZATION REVIEW CONTACT:  Loraine Bence, Utilization   Network Utilization Review Department  Phone: [phone]  Fax: [phone]  Email: [email or web address]  Contact for approvals/pending authorizations, clinical reviews, and discharge  PHYSICIAN ADVISORY SERVICES:  Medical Necessity Denial & [employer] Review  Phone: [phone]  Fax: [phone]  Email: [email or web address]

## 2023-04-26 NOTE — PROCEDURES
Central Line Insertion    Date/Time: 4/25/2023 8:56 PM  Performed by: Merline Allegra, MD  Authorized by: Merline Allegra, MD     Patient location:  Bedside  Consent:     Consent obtained:  Verbal    Consent given by:  Patient    Risks discussed:  Arterial puncture, bleeding, incorrect placement, infection and pneumothorax    Alternatives discussed:  No treatment  Universal protocol:     Patient identity confirmed:  Verbally with patient  Pre-procedure details:     Hand hygiene: Hand hygiene performed prior to insertion      Skin preparation:  2% chlorhexidine    Skin preparation agent: Skin preparation agent completely dried prior to procedure    Indications:     Central line indications: medications requiring central line    Anesthesia (see MAR for exact dosages): Anesthesia method:  Local infiltration    Local anesthetic:  Lidocaine 1% w/o epi  Procedure details:     Location:  Right internal jugular    Vessel type: vein      Laterality:  Right    Approach: percutaneous technique used      Patient position:  Trendelenburg    Catheter type:  Triple lumen 16cm    Catheter size:  7 Fr    Landmarks identified: yes      Ultrasound guidance: yes      Ultrasound image availability:  Images available in PACS    Sterile ultrasound techniques: Sterile gel and sterile probe covers were used      Number of attempts:  2    Successful placement: yes    Post-procedure details:     Post-procedure:  Dressing applied and line sutured    Assessment:  Blood return through all ports, free fluid flow and no pneumothorax on x-ray    Post-procedure complications: none      Patient tolerance of procedure:   Tolerated well, no immediate complications

## 2023-04-26 NOTE — PHYSICAL THERAPY NOTE
PT Cancellation       04/26/23 1045   PT Last Visit   PT Visit Date 04/26/23   Note Type   Note type Evaluation; Cancelled Session   Cancel Reasons Medical status   Additional Comments Hold per RN- secondary to medical status     [facility] PT, DPT

## 2023-04-26 NOTE — QUICK NOTE
I called the patient's daughter, Diogenes Dong, to provide a medical update  Patient is currently being intubated  All questions and concerns answered       DIANELYS Corey   PGY-2 Internal Medicine   [facility]

## 2023-04-26 NOTE — PROGRESS NOTES
Daily Progress Note - Critical Care   Mary Lovell 63 y o  male MRN: [id number]  Unit/Bed#: MICU 08 Encounter: [id number]        ----------------------------------------------------------------------------------------  HPI    63year old male PMH of alcohol abuse with prior DTs, end-stage COPD, Crohn's disease s/p ileostomy and reversal in 2018, CAD with MI in 2012 with stent in RCA presented to [facility] ED on 4/25 with complaint of shortness of breath for several days  He was hypotensive, tachycardic, and tachypneic with an elevated lactate of 20  Patient required pressor support and BiPAP, subsequently transferred to ICU for further management  24hr events:   Taken off BiPAP and transitioned to NC ~6:30 pm last night  Precedex was discontinued  R IJ CVC placed and started on vaso  Received 12 5g 5% albumin and 500ml NS bolus  UOP overnight 250cc  10 kg weight gain  Fomepizole started  ---------------------------------------------------------------------------------------  SUBJECTIVE  Feels better than he did yesterday  Slept off/on last night  Review of Systems  Review of systems was reviewed and negative unless stated above in HPI/24-hour events   ---------------------------------------------------------------------------------------  Assessment and Plan:    Neuro:   • Diagnosis: Alcohol abuse   o Ethanol 4/25: 45  o Ethylene glycol 4/25: pending   o Methanol 4/25: pending   o UDS 4/25: negative   o Last drink 4/25 prior to EMS arrival  o Received 158mg phenobarbital 4/25  o Plan:   - [facility] protocol  - Thiamine, folate   - Fomepizole started 4/25, can d/c if toxic alcohols negative    - Monitor for alcohol withdrawal   • Diagnosis: Delirium ppx   o Plan:   - CAM ICU  Sleep-wake regulation         CV:   • Diagnosis: Undifferentiated shock  o Suspect septic shock   o Received 30 cc/kg IVF bolus  o Plan:   - TTE ordered   - Wean levophed and vaso, maintain MAP >65  - Continue cefepime/flagyl/vanc    • Diagnosis: CAD with MI in 2012 s/p RCA stent   o Home atorvastatin 40mg daily   o Plan:   - Continue home statin       Pulm:  • Diagnosis: Acute hypoxic respiratory failure   o Pulmonary edema vs worsening COPD   o BNP 3297 (330 in 2016)   o CT CAP 4/25: moderate centrilobar emphysema  A 2 mm right middle lobe pulmonary nodule (4/147) and a 3 mm right lower lobe solid pulm nodule (4/193), stable since 5/15/2018, therefore consistent with a benign etiology  No new or enlarging pulmonary nodules  o Plan:   - Continue NC, maintain SpO2 >88%   • Diagnosis: End-stage COPD   o Home albuterol and ipratropium   o Plan:   - Continue atrovent/xopenex TID   - Start solumedrol 40mg q8h      GI:   • Diagnosis: Abnormal liver function   o , ALT 66, Alk phos 263, T bili 1 10  o CT CAP 4/25: hepatomegaly and severe hepatic steatosis   o Chronic hepatitis panel 4/25: negative   o Plan:   - RUQ U/S ordered   - Trend LFTs, INR    • Diagnosis: Hyperammonemia (resolved)   o Ammonia POA 82, most recent 32 this AM   o Plan:   - Discontinue lactulose  • Diagnosis: Crohn's disease   ? H/o transverse colon perforation s/p ileostomy and reversal in 2018  ? Currently does not take any medications for Crohns   ? CT CAP 4/25: no bowel obstruction  Underdistended descending colon  Postsurgical changes of partial right hemicolectomy  • Diagnosis: Bloody NGT output  ? Patient evaluated by acute care surgery in the ED, NGT was inserted due to concern for SBO vs ischemic bowel, ~100 cc of bloody output seen in cannula  ? CT CAP 4/25: circumferential mural thickening of esophagus with an air-fluid level, may be related to esophagitis and esophageal dysmotility  o Plan:   - Monitor NGT output and trend H&H   • Diagnosis: Elevated lipase and amylase   o Possible chronic alcoholic pancreatitis   o Lipase 4/25: 2090   Amylase 4/25: 155  o Plan:   - Continue to trend lipase  - F/u RUQ ultrasound       : • Diagnosis: DEBBY   o Unclear etiology at this time   o Cr 2 5, down from 2 87 on presentation  Baseline 0 9-1 2   o Plan:   - Hernandez in place, strict I/Os   - Trend BUN/Cr   • Diagnosis: Lactic acidosis (improving)   o Sepsis vs other shock vs albuterol   o Lactic acid POA 20, down to 4 1 this AM   o Plan:   - Trend lactate   • Diagnosis: High anion gap metabolic acidosis (resolved)   o Secondary to lactic acidosis   o VBG 4/25: pH 7 07, pCO2 39 5, HCO3 11 2   o ABG 4/25: pH 7 286, pCO2 38 9, HCO3 18 1    o AG was 28 4/25, 11 this AM   o Plan:   - Continue to trend lactic acid       F/E/N:   • Diagnosis: Hyponatremia  o Possible beer potomania   o Urine osm 487, urine Na <5, serum osm 281   o Plan:   - BMP q4h   • Fluids: D5 in 1/2NS @ 75/hr   • Replete electrolytes as needed   • Nutrition: NPO      Heme/Onc:   • Diagnosis: Macrocytic anemia   o Low folate 2 3   o Vitamin B12 wnl (682)  o Plan:   - Folate 1000 mcg daily   - Trend H&H and monitor for s/s bleeding   • Diagnosis: DVT ppx   o Plan: SCDs       Endo:   • Diagnosis: Hyperglycemia   o A1c 4/25: pending    o Plan:  - SSI       ID:   • Diagnosis: Septic shock   o No clear source at this time   o U/A microscopic 4/25:   o Urine cx 4/25: pending   o Blood cx 4/25: pending   o Plan:   - Continue cefepime/flagyl/vanc       MSK/Skin:   • Diagnosis: DTI ppx   o Plan: Frequent repositioning  PT/OT and OOB as tolerated       Disposition: Continue Critical Care   Code Status: Level 1 - Full Code  ---------------------------------------------------------------------------------------  ICU CORE MEASURES    Prophylaxis   VTE Pharmacologic Prophylaxis: N/A  VTE Mechanical Prophylaxis: sequential compression device  Stress Ulcer Prophylaxis: Prophylaxis Not Indicated     ABCDE Protocol (if indicated)  Plan to perform spontaneous awakening trial today? Not applicable  Plan to perform spontaneous breathing trial today? Not applicable  Obvious barriers to extubation?  Not applicable  CAM-ICU: Negative    Invasive Devices Review  Invasive Devices     Central Venous Catheter Line  Duration           CVC Central Lines 23 Triple 16cm <1 day          Peripheral Intravenous Line  Duration           Peripheral IV 23 Left Antecubital 1 day          Drain  Duration           NG/OG/Enteral Tube Nasogastric 18 Fr Center mouth <1 day    Rectal Tube With balloon <1 day    Urethral Catheter Temperature probe <1 day              Can any invasive devices be discontinued today? No  ---------------------------------------------------------------------------------------  OBJECTIVE    Vitals   Vitals:    23 0551 23 0600 23 0700 23 0714   BP:  (!) 88/49 (!) 93/46 (!) 93/46   BP Location:       Pulse:  (!) 112 (!) 108 (!) 110   Resp:    20   Temp:  99 °F (37 2 °C) 99 °F (37 2 °C) 99 °F (37 2 °C)   TempSrc:       SpO2:  99% 99% 99%   Weight: 77 kg (169 lb 12 1 oz)        Temp (24hrs), Av 7 °F (37 1 °C), Min:97 5 °F (36 4 °C), Max:99 7 °F (37 6 °C)  Current: Temperature: 99 °F (37 2 °C)    Respiratory:  SpO2: SpO2: 99 %  Nasal Cannula O2 Flow Rate (L/min): 2 5 L/min    Invasive/non-invasive ventilation settings   Respiratory    Lab Data (Last 4 hours)    None         O2/Vent Data (Last 4 hours)    None                Physical Exam  Vitals and nursing note reviewed  Constitutional:       General: He is not in acute distress  HENT:      Head: Normocephalic and atraumatic  Nose:      Comments: NGT in place   Cardiovascular:      Rate and Rhythm: Regular rhythm  Tachycardia present  Pulmonary:      Effort: Pulmonary effort is normal  No respiratory distress  Breath sounds: Wheezing present  Abdominal:      Palpations: Abdomen is soft  Tenderness: There is no abdominal tenderness  There is no guarding  Hernia: A hernia is present  Genitourinary:     Comments: Hernandez in place   Skin:     General: Skin is warm and dry        Capillary Refill: Capillary refill takes less than 2 seconds  Neurological:      General: No focal deficit present  Mental Status: He is alert and oriented to person, place, and time               Laboratory and Diagnostics:  Results from last 7 days   Lab Units 04/26/23  0429 04/25/23  1346 04/25/23  1045   WBC Thousand/uL 10 52*  --  20 57*   HEMOGLOBIN g/dL 8 2* 11 4* 12 1   HEMATOCRIT % 24 3* 33 7* 37 7   PLATELETS Thousands/uL 74*  --  155   NEUTROS PCT % 86*  --  85*   MONOS PCT % 7  --  7     Results from last 7 days   Lab Units 04/26/23  0429 04/26/23  0010 04/25/23  1801 04/25/23  1704 04/25/23  1045   SODIUM mmol/L 125* 125* 126* 133* 125*   POTASSIUM mmol/L 4 3 4 6 5 1 3 8 4 7   CHLORIDE mmol/L 93* 89* 93* 106 86*   CO2 mmol/L 21 19* 15* 11* 11*   ANION GAP mmol/L 11 17* 18* 16* 28*   BUN mg/dL 33* 36* 34* 27* 33*   CREATININE mg/dL 2 50* 2 78* 2 83* 2 03* 2 87*   CALCIUM mg/dL 7 6* 7 6* 7 3* 5 4* 8 5   GLUCOSE RANDOM mg/dL 129 226* 154* 118 226*   ALT U/L  --   --   --  47 66   AST U/L  --   --   --  147* 204*   ALK PHOS U/L  --   --   --  168* 263*   ALBUMIN g/dL  --   --   --  1 5* 2 1*   TOTAL BILIRUBIN mg/dL  --   --   --  1 28* 1 10*     Results from last 7 days   Lab Units 04/26/23  0429   MAGNESIUM mg/dL 1 1*   PHOSPHORUS mg/dL 2 8      Results from last 7 days   Lab Units 04/26/23  0429 04/25/23  1045   INR  1 30* 1 21*   PTT seconds  --  28          Results from last 7 days   Lab Units 04/26/23  0543 04/26/23  0327 04/26/23  0010 04/25/23  1704 04/25/23  1627 04/25/23  1430 04/25/23  1045   LACTIC ACID mmol/L 4 1* 5 9* 6 6* 10 2* 11 7* 12 9* 20 3*     ABG:  Results from last 7 days   Lab Units 04/25/23  1802   PH ART  7 286*   PCO2 ART mm Hg 38 9   PO2 ART mm Hg 296 1*   HCO3 ART mmol/L 18 1*   BASE EXC ART mmol/L -7 9   ABG SOURCE  Line, Arterial     VBG:  Results from last 7 days   Lab Units 04/25/23  1802 04/25/23  1430 04/25/23  1045   PH SANAZ   --   --  7 072*   PCO2 SANAZ mm Hg  --   --  39 5*   PO2 SANAZ mm Hg  --   --  66 5*   HCO3 SANAZ mmol/L  --   --  11 2*   BASE EXC SANAZ mmol/L  --   --  -18 1   ABG SOURCE  Line, Arterial   < >  --     < > = values in this interval not displayed  Results from last 7 days   Lab Units 04/25/23  1045   PROCALCITONIN ng/ml 1 73*       Micro  Results from last 7 days   Lab Units 04/25/23  1112 04/25/23  1045   BLOOD CULTURE  Received in Microbiology Lab  Culture in Progress  Received in Microbiology Lab  Culture in Progress  Imaging: I have personally reviewed pertinent reports  Intake and Output  I/O       04/24 0701 04/25 0700 04/25 0701 04/26 0700    I V  (mL/kg)  1837 8 (23 9)    NG/GT  0    IV Piggyback  2950    Total Intake(mL/kg)  4787 8 (62 2)    Urine (mL/kg/hr)  310    Emesis/NG output  2000    Stool  600    Total Output  2910    Net  +1877 8                Height and Weights         Body mass index is 27 4 kg/m²  Weight (last 2 days)     Date/Time Weight    04/26/23 0551 77 (169 75)    04/25/23 1455 68 5 (151 02)            Nutrition       Diet Orders   (From admission, onward)             Start     Ordered    04/25/23 2049  Diet NPO; Sips with meds  Diet effective now        References:    Nutrtion Support Algorithm Enteral vs  Parenteral   Question Answer Comment   Diet Type NPO    NPO Except: Sips with meds    RD to adjust diet per protocol?  Yes        04/25/23 2048                  Active Medications  Scheduled Meds:  Current Facility-Administered Medications   Medication Dose Route Frequency Provider Last Rate   • acetaminophen  650 mg Oral Q6H PRN Laisha Palacio MD     • albuterol  2 5 mg Nebulization Q4H PRN Eligha Libman, MD     • cefepime  1,000 mg Intravenous Q12H Deisi Millard DO Stopped (04/26/23 0000)   • chlorhexidine  15 mL Mouth/Throat Q12H [facility] Arnulfo Keyes MD     • dextrose 5 % and sodium chloride 0 45 %  75 mL/hr Intravenous Continuous Laisha Palacio MD 75 mL/hr (04/43/18 1468)   • folic acid  9,373 mcg Oral Daily Arnulfo Keyes MD     • fomepizole (ANTIZOL) IVPB  10 mg/kg Intravenous Q12H Juno Carlos MD Stopped (04/26/23 0600)    Followed by   • [START ON 4/28/2023] fomepizole (ANTIZOL) IVPB  15 mg/kg Intravenous Q12H Juno Carlos MD     • insulin lispro  1-6 Units Subcutaneous Q6H [facility] Fady Cohn MD     • ipratropium  0 5 mg Nebulization TID Taryn Franco MD     • lactulose  20 g Oral BID Farshad Myrick MD     • levalbuterol  1 25 mg Nebulization TID Taryn Franco MD     • magnesium sulfate  2 g Intravenous Once Sanaz Campbell MD 2 g (04/26/23 4101)   • magnesium sulfate  2 g Intravenous Once Fady Cohn MD     • methylPREDNISolone sodium succinate  40 mg Intravenous Q8H [facility] Juno Carlos MD     • metroNIDAZOLE  500 mg Intravenous Q8H Deisi Millard DO Stopped (04/26/23 0000)   • multivitamin stress formula  1 tablet Oral Daily Juno Carlos MD     • nicotine  21 mg Transdermal Daily Sanaz Campbell MD     • norepinephrine  1-30 mcg/min Intravenous Titrated Nafisa Rea MD 5 mcg/min (04/26/23 8447)   • pantoprazole  40 mg Intravenous Q12H [facility] Fady Cohn MD     • phenol  1 spray Mouth/Throat Q2H PRN Farshad Myrick MD     • thiamine  500 mg Intravenous Daily Fady Cohn MD Stopped (04/25/23 1936)   • vancomycin  15 mg/kg Intravenous Daily PRN Fady Cohn MD     • vasopressin  0 04 Units/min Intravenous Continuous Farshad Myrick MD 0 04 Units/min (04/26/23 0314)     Continuous Infusions:  dextrose 5 % and sodium chloride 0 45 %, 75 mL/hr, Last Rate: 75 mL/hr (04/26/23 0143)  norepinephrine, 1-30 mcg/min, Last Rate: 5 mcg/min (04/26/23 0702)  vasopressin, 0 04 Units/min, Last Rate: 0 04 Units/min (04/26/23 0314)      PRN Meds:   acetaminophen, 650 mg, Q6H PRN  albuterol, 2 5 mg, Q4H PRN  phenol, 1 spray, Q2H PRN  vancomycin, 15 mg/kg, Daily PRN        Allergies   Allergies   Allergen Reactions   • Medical Tape    • Other      Brown cloth band aids      • Latex Rash "    ---------------------------------------------------------------------------------------  Advance Directive and Living Will:      Power of :    POLST:    ---------------------------------------------------------------------------------------    Omar Godwin MD      Portions of the record may have been created with voice recognition software  Occasional wrong word or \"sound a like\" substitutions may have occurred due to the inherent limitations of voice recognition software    Read the chart carefully and recognize, using context, where substitutions have occurred  "

## 2023-04-26 NOTE — QUICK NOTE
Discussed progress with primary team   Agree with discontinuation of fomepizole at this point  Seems to be progressing to delirium tremens with overall etiology of presentation being consistent with severe alcohol use disorder resulting in hyponatremia, pancreatitis, dehydration, hemorrhagic gastritis, alcoholic ketoacidosis, and encephalopathy  Agree with continuation of aggressive alcohol withdrawal management  Would recommend phenobarbital 650 mg now followed by 130 mg hourly until heart rate under 100  Hold for any sedation, for example RASS -1  Given that he is hallucinating, he will likely need prolonged period of time to recover, potentially 3 to 7 days  Total phenobarbital dosing recommended would be a maximum of 2 to 2 5 g   If reaching 2 g and in need of additional treatment, adjunctive measures could include dexmedetomidine and ketamine, as needed benzodiazepines  At any point, recommended antipsychotic medication would be olanzapine since he is hypomagnesemic and this would pose little risk of QT prolongation  Although the encephalopathy is most likely due to delirium tremens, cannot fully exclude Warnicke's encephalopathy, especially given his malnutrition  May consider thiamine 500 mg 3 times daily for 3 days then 100 mg 3 times daily for 3 days and then 100 mg daily since he is encephalopathic  Please continue to optimize electrolytes and additional supportive care  Hypotension is unlikely associated directly with alcohol withdrawal   Further evaluation per primary team   Once stabilized, please involve case management for resource referral   Please do not hesitate to reach out for any additional questions or concerns

## 2023-04-26 NOTE — PLAN OF CARE
Problem: MOBILITY - ADULT  Goal: Maintain or return to baseline ADL function  Description: INTERVENTIONS:  -  Assess patient's ability to carry out ADLs; assess patient's baseline for ADL function and identify physical deficits which impact ability to perform ADLs (bathing, care of mouth/teeth, toileting, grooming, dressing, etc )  - Assess/evaluate cause of self-care deficits   - Assess range of motion  - Assess patient's mobility; develop plan if impaired  - Assess patient's need for assistive devices and provide as appropriate  - Encourage maximum independence but intervene and supervise when necessary  - Involve family in performance of ADLs  - Assess for home care needs following discharge   - Consider OT consult to assist with ADL evaluation and planning for discharge  - Provide patient education as appropriate  Outcome: Progressing  Goal: Maintains/Returns to pre admission functional level  Description: INTERVENTIONS:  - Perform BMAT or MOVE assessment daily    - Set and communicate daily mobility goal to care team and patient/family/caregiver  - Collaborate with rehabilitation services on mobility goals if consulted  - Perform Range of Motion 5 times a day  - Reposition patient every 2 hours    - Dangle patient 1 times a day  - Stand patient 1 times a day  - Ambulate patient 1 times a day  - Out of bed to chair 1 times a day   - Out of bed for meals 1 times a day  - Out of bed for toileting  - Record patient progress and toleration of activity level   Outcome: Progressing     Problem: Prexisting or High Potential for Compromised Skin Integrity  Goal: Skin integrity is maintained or improved  Description: INTERVENTIONS:  - Identify patients at risk for skin breakdown  - Assess and monitor skin integrity  - Assess and monitor nutrition and hydration status  - Monitor labs   - Assess for incontinence   - Turn and reposition patient  - Assist with mobility/ambulation  - Relieve pressure over bony prominences  - Avoid friction and shearing  - Provide appropriate hygiene as needed including keeping skin clean and dry  - Evaluate need for skin moisturizer/barrier cream  - Collaborate with interdisciplinary team   - Patient/family teaching  - Consider wound care consult   Outcome: Progressing

## 2023-04-27 ENCOUNTER — APPOINTMENT (INPATIENT)
Dept: NEUROLOGY | Facility: CLINIC | Age: 63
End: 2023-04-27

## 2023-04-27 LAB
ALBUMIN SERPL BCP-MCNC: 1.7 G/DL (ref 3.5–5)
ALP SERPL-CCNC: 158 U/L (ref 46–116)
ALT SERPL W P-5'-P-CCNC: 53 U/L (ref 12–78)
ANION GAP SERPL CALCULATED.3IONS-SCNC: 10 MMOL/L (ref 4–13)
ANION GAP SERPL CALCULATED.3IONS-SCNC: 10 MMOL/L (ref 4–13)
ANION GAP SERPL CALCULATED.3IONS-SCNC: 4 MMOL/L (ref 4–13)
ANION GAP SERPL CALCULATED.3IONS-SCNC: 5 MMOL/L (ref 4–13)
ANION GAP SERPL CALCULATED.3IONS-SCNC: 7 MMOL/L (ref 4–13)
AST SERPL W P-5'-P-CCNC: 134 U/L (ref 5–45)
BACTERIA UR CULT: NORMAL
BASE EX.OXY STD BLDV CALC-SCNC: 63.2 % (ref 60–80)
BASE EX.OXY STD BLDV CALC-SCNC: 69.6 % (ref 60–80)
BASE EX.OXY STD BLDV CALC-SCNC: 82.1 % (ref 60–80)
BASE EXCESS BLDV CALC-SCNC: -1.6 MMOL/L
BASE EXCESS BLDV CALC-SCNC: -2.9 MMOL/L
BASE EXCESS BLDV CALC-SCNC: -3.6 MMOL/L
BASOPHILS # BLD AUTO: 0.01 THOUSANDS/ÂΜL (ref 0–0.1)
BASOPHILS NFR BLD AUTO: 0 % (ref 0–1)
BILIRUB SERPL-MCNC: 1.19 MG/DL (ref 0.2–1)
BODY TEMPERATURE: 100 DEGREES FEHRENHEIT
BODY TEMPERATURE: 99.9 DEGREES FEHRENHEIT
BODY TEMPERATURE: 99.9 DEGREES FEHRENHEIT
BUN SERPL-MCNC: 29 MG/DL (ref 5–25)
BUN SERPL-MCNC: 30 MG/DL (ref 5–25)
BUN SERPL-MCNC: 31 MG/DL (ref 5–25)
BUN SERPL-MCNC: 32 MG/DL (ref 5–25)
BUN SERPL-MCNC: 33 MG/DL (ref 5–25)
C DIFF TOX GENS STL QL NAA+PROBE: NEGATIVE
CA-I BLD-SCNC: 1.06 MMOL/L (ref 1.12–1.32)
CALCIUM ALBUM COR SERPL-MCNC: 9.5 MG/DL (ref 8.3–10.1)
CALCIUM SERPL-MCNC: 6.8 MG/DL (ref 8.3–10.1)
CALCIUM SERPL-MCNC: 6.9 MG/DL (ref 8.3–10.1)
CALCIUM SERPL-MCNC: 7 MG/DL (ref 8.3–10.1)
CALCIUM SERPL-MCNC: 7.7 MG/DL (ref 8.3–10.1)
CALCIUM SERPL-MCNC: 7.7 MG/DL (ref 8.3–10.1)
CAMPYLOBACTER DNA SPEC NAA+PROBE: NORMAL
CHLORIDE SERPL-SCNC: 103 MMOL/L (ref 96–108)
CHLORIDE SERPL-SCNC: 104 MMOL/L (ref 96–108)
CHLORIDE SERPL-SCNC: 97 MMOL/L (ref 96–108)
CHLORIDE SERPL-SCNC: 99 MMOL/L (ref 96–108)
CHLORIDE SERPL-SCNC: 99 MMOL/L (ref 96–108)
CO2 SERPL-SCNC: 21 MMOL/L (ref 21–32)
CO2 SERPL-SCNC: 21 MMOL/L (ref 21–32)
CO2 SERPL-SCNC: 24 MMOL/L (ref 21–32)
CREAT SERPL-MCNC: 1.97 MG/DL (ref 0.6–1.3)
CREAT SERPL-MCNC: 1.99 MG/DL (ref 0.6–1.3)
CREAT SERPL-MCNC: 2.01 MG/DL (ref 0.6–1.3)
CREAT SERPL-MCNC: 2.08 MG/DL (ref 0.6–1.3)
CREAT SERPL-MCNC: 2.09 MG/DL (ref 0.6–1.3)
EOSINOPHIL # BLD AUTO: 0 THOUSAND/ÂΜL (ref 0–0.61)
EOSINOPHIL NFR BLD AUTO: 0 % (ref 0–6)
ERYTHROCYTE [DISTWIDTH] IN BLOOD BY AUTOMATED COUNT: 14.1 % (ref 11.6–15.1)
GFR SERPL CREATININE-BSD FRML MDRD: 33 ML/MIN/1.73SQ M
GFR SERPL CREATININE-BSD FRML MDRD: 33 ML/MIN/1.73SQ M
GFR SERPL CREATININE-BSD FRML MDRD: 35 ML/MIN/1.73SQ M
GLUCOSE SERPL-MCNC: 173 MG/DL (ref 65–140)
GLUCOSE SERPL-MCNC: 183 MG/DL (ref 65–140)
GLUCOSE SERPL-MCNC: 203 MG/DL (ref 65–140)
GLUCOSE SERPL-MCNC: 205 MG/DL (ref 65–140)
GLUCOSE SERPL-MCNC: 217 MG/DL (ref 65–140)
GLUCOSE SERPL-MCNC: 229 MG/DL (ref 65–140)
GLUCOSE SERPL-MCNC: 237 MG/DL (ref 65–140)
GLUCOSE SERPL-MCNC: 244 MG/DL (ref 65–140)
GLUCOSE SERPL-MCNC: 260 MG/DL (ref 65–140)
HCO3 BLDV-SCNC: 22.9 MMOL/L (ref 24–30)
HCO3 BLDV-SCNC: 23.9 MMOL/L (ref 24–30)
HCO3 BLDV-SCNC: 24 MMOL/L (ref 24–30)
HCT VFR BLD AUTO: 21.3 % (ref 36.5–49.3)
HCT VFR BLD AUTO: 21.8 % (ref 36.5–49.3)
HGB BLD-MCNC: 7.2 G/DL (ref 12–17)
HGB BLD-MCNC: 7.3 G/DL (ref 12–17)
HOROWITZ INDEX BLDA+IHG-RTO: 40 MM[HG]
HOROWITZ INDEX BLDA+IHG-RTO: 40 MM[HG]
HOROWITZ INDEX BLDA+IHG-RTO: 50 MM[HG]
IMM GRANULOCYTES # BLD AUTO: 0.15 THOUSAND/UL (ref 0–0.2)
IMM GRANULOCYTES NFR BLD AUTO: 2 % (ref 0–2)
INR PPP: 1.35 (ref 0.84–1.19)
LYMPHOCYTES # BLD AUTO: 0.36 THOUSANDS/ÂΜL (ref 0.6–4.47)
LYMPHOCYTES NFR BLD AUTO: 4 % (ref 14–44)
MAGNESIUM SERPL-MCNC: 2.2 MG/DL (ref 1.6–2.6)
MAGNESIUM SERPL-MCNC: 2.3 MG/DL (ref 1.6–2.6)
MAGNESIUM SERPL-MCNC: 2.5 MG/DL (ref 1.6–2.6)
MCH RBC QN AUTO: 33.8 PG (ref 26.8–34.3)
MCHC RBC AUTO-ENTMCNC: 33.8 G/DL (ref 31.4–37.4)
MCV RBC AUTO: 100 FL (ref 82–98)
MONOCYTES # BLD AUTO: 0.28 THOUSAND/ÂΜL (ref 0.17–1.22)
MONOCYTES NFR BLD AUTO: 3 % (ref 4–12)
NEUTROPHILS # BLD AUTO: 8.01 THOUSANDS/ÂΜL (ref 1.85–7.62)
NEUTS SEG NFR BLD AUTO: 91 % (ref 43–75)
NRBC BLD AUTO-RTO: 1 /100 WBCS
O2 CT BLDV-SCNC: 6.6 ML/DL
O2 CT BLDV-SCNC: 7.9 ML/DL
O2 CT BLDV-SCNC: 9.6 ML/DL
PCO2 BLD: 45.2 MM HG (ref 42–50)
PCO2 BLD: 46 MM HG (ref 42–50)
PCO2 BLDV: 43.8 MM HG (ref 42–50)
PCO2 BLDV: 44.6 MM HG (ref 42–50)
PCO2 BLDV: 57.8 MM HG (ref 42–50)
PEEP RESPIRATORY: 8 CM[H2O]
PH BLD: 7.32 [PH] (ref 7.3–7.4)
PH BLD: 7.34 [PH] (ref 7.3–7.4)
PH BLDV: 7.24 [PH] (ref 7.3–7.4)
PH BLDV: 7.33 [PH] (ref 7.3–7.4)
PH BLDV: 7.35 [PH] (ref 7.3–7.4)
PHOSPHATE SERPL-MCNC: 1.4 MG/DL (ref 2.3–4.1)
PHOSPHATE SERPL-MCNC: 1.8 MG/DL (ref 2.3–4.1)
PHOSPHATE SERPL-MCNC: 1.9 MG/DL (ref 2.3–4.1)
PHOSPHATE SERPL-MCNC: 2.3 MG/DL (ref 2.3–4.1)
PLATELET # BLD AUTO: 83 THOUSANDS/UL (ref 149–390)
PMV BLD AUTO: 9.6 FL (ref 8.9–12.7)
PO2 BLDV: 34 MM HG (ref 35–45)
PO2 BLDV: 37.8 MM HG (ref 35–45)
PO2 BLDV: 53.6 MM HG (ref 35–45)
PO2 VENOUS TEMP CORRECTED: 35.7 MM HG (ref 35–45)
PO2 VENOUS TEMP CORRECTED: 39.7 MM HG (ref 35–45)
POTASSIUM SERPL-SCNC: 3.7 MMOL/L (ref 3.5–5.3)
POTASSIUM SERPL-SCNC: 3.8 MMOL/L (ref 3.5–5.3)
POTASSIUM SERPL-SCNC: 3.9 MMOL/L (ref 3.5–5.3)
PROT SERPL-MCNC: 4.8 G/DL (ref 6.4–8.4)
PROTHROMBIN TIME: 16.9 SECONDS (ref 11.6–14.5)
RBC # BLD AUTO: 2.13 MILLION/UL (ref 3.88–5.62)
SALMONELLA DNA SPEC QL NAA+PROBE: NORMAL
SHIGA TOXIN STX GENE SPEC NAA+PROBE: NORMAL
SHIGELLA DNA SPEC QL NAA+PROBE: NORMAL
SODIUM SERPL-SCNC: 128 MMOL/L (ref 135–147)
SODIUM SERPL-SCNC: 130 MMOL/L (ref 135–147)
SODIUM SERPL-SCNC: 130 MMOL/L (ref 135–147)
SODIUM SERPL-SCNC: 132 MMOL/L (ref 135–147)
SODIUM SERPL-SCNC: 132 MMOL/L (ref 135–147)
VANCOMYCIN SERPL-MCNC: 23.8 UG/ML (ref 10–20)
VENT AC: 20
VENT AC: 20
VENT AC: 28
VENT- AC: AC
VT SETTING VENT: 450 ML
WBC # BLD AUTO: 8.81 THOUSAND/UL (ref 4.31–10.16)

## 2023-04-27 RX ORDER — FENTANYL CITRATE 50 UG/ML
INJECTION, SOLUTION INTRAMUSCULAR; INTRAVENOUS
Status: COMPLETED
Start: 2023-04-27 | End: 2023-04-27

## 2023-04-27 RX ORDER — INSULIN LISPRO 100 [IU]/ML
2-12 INJECTION, SOLUTION INTRAVENOUS; SUBCUTANEOUS EVERY 6 HOURS SCHEDULED
Status: DISCONTINUED | OUTPATIENT
Start: 2023-04-28 | End: 2023-05-01

## 2023-04-27 RX ORDER — FENTANYL CITRATE 50 UG/ML
50 INJECTION, SOLUTION INTRAMUSCULAR; INTRAVENOUS
Status: DISCONTINUED | OUTPATIENT
Start: 2023-04-27 | End: 2023-04-28

## 2023-04-27 RX ORDER — PREDNISONE 20 MG/1
40 TABLET ORAL DAILY
Status: DISCONTINUED | OUTPATIENT
Start: 2023-04-28 | End: 2023-04-30

## 2023-04-27 RX ORDER — FENTANYL CITRATE-0.9 % NACL/PF 10 MCG/ML
100 PLASTIC BAG, INJECTION (ML) INTRAVENOUS CONTINUOUS
Status: DISCONTINUED | OUTPATIENT
Start: 2023-04-27 | End: 2023-04-28

## 2023-04-27 RX ORDER — METHYLPREDNISOLONE SODIUM SUCCINATE 40 MG/ML
INJECTION, POWDER, LYOPHILIZED, FOR SOLUTION INTRAMUSCULAR; INTRAVENOUS
Status: COMPLETED
Start: 2023-04-27 | End: 2023-04-27

## 2023-04-27 RX ORDER — FUROSEMIDE 10 MG/ML
40 INJECTION INTRAMUSCULAR; INTRAVENOUS ONCE
Status: COMPLETED | OUTPATIENT
Start: 2023-04-27 | End: 2023-04-27

## 2023-04-27 RX ORDER — FUROSEMIDE 10 MG/ML
20 INJECTION INTRAMUSCULAR; INTRAVENOUS ONCE
Status: DISCONTINUED | OUTPATIENT
Start: 2023-04-27 | End: 2023-04-27

## 2023-04-27 RX ORDER — FENTANYL CITRATE 50 UG/ML
25 INJECTION, SOLUTION INTRAMUSCULAR; INTRAVENOUS
Status: DISCONTINUED | OUTPATIENT
Start: 2023-04-27 | End: 2023-04-27

## 2023-04-27 RX ORDER — METHYLPREDNISOLONE SODIUM SUCCINATE 40 MG/ML
40 INJECTION, POWDER, LYOPHILIZED, FOR SOLUTION INTRAMUSCULAR; INTRAVENOUS DAILY
Status: DISCONTINUED | OUTPATIENT
Start: 2023-04-28 | End: 2023-04-27

## 2023-04-27 RX ORDER — CALCIUM GLUCONATE 20 MG/ML
2 INJECTION, SOLUTION INTRAVENOUS ONCE
Status: COMPLETED | OUTPATIENT
Start: 2023-04-27 | End: 2023-04-27

## 2023-04-27 RX ORDER — PHENOBARBITAL SODIUM 65 MG/ML
130 INJECTION INTRAMUSCULAR ONCE
Status: COMPLETED | OUTPATIENT
Start: 2023-04-27 | End: 2023-04-27

## 2023-04-27 RX ORDER — DEXMEDETOMIDINE HYDROCHLORIDE 4 UG/ML
.1-.7 INJECTION, SOLUTION INTRAVENOUS
Status: DISCONTINUED | OUTPATIENT
Start: 2023-04-27 | End: 2023-04-27

## 2023-04-27 RX ADMIN — FENTANYL CITRATE 25 MCG: 50 INJECTION, SOLUTION INTRAMUSCULAR; INTRAVENOUS at 13:33

## 2023-04-27 RX ADMIN — THIAMINE HYDROCHLORIDE 500 MG: 100 INJECTION, SOLUTION INTRAMUSCULAR; INTRAVENOUS at 16:29

## 2023-04-27 RX ADMIN — METRONIDAZOLE 500 MG: 500 INJECTION, SOLUTION INTRAVENOUS at 00:51

## 2023-04-27 RX ADMIN — IPRATROPIUM BROMIDE 0.5 MG: 0.5 SOLUTION RESPIRATORY (INHALATION) at 19:16

## 2023-04-27 RX ADMIN — IPRATROPIUM BROMIDE 0.5 MG: 0.5 SOLUTION RESPIRATORY (INHALATION) at 07:28

## 2023-04-27 RX ADMIN — CALCIUM GLUCONATE 2 G: 20 INJECTION, SOLUTION INTRAVENOUS at 03:16

## 2023-04-27 RX ADMIN — B-COMPLEX W/ C & FOLIC ACID TAB 1 TABLET: TAB at 09:07

## 2023-04-27 RX ADMIN — METHYLPREDNISOLONE SODIUM SUCCINATE 40 MG: 40 INJECTION, POWDER, FOR SOLUTION INTRAMUSCULAR; INTRAVENOUS at 09:18

## 2023-04-27 RX ADMIN — Medication 25 MCG/HR: at 13:06

## 2023-04-27 RX ADMIN — DEXTROSE AND SODIUM CHLORIDE 125 ML/HR: 5; .9 INJECTION, SOLUTION INTRAVENOUS at 10:32

## 2023-04-27 RX ADMIN — INSULIN LISPRO 3 UNITS: 100 INJECTION, SOLUTION INTRAVENOUS; SUBCUTANEOUS at 12:15

## 2023-04-27 RX ADMIN — DEXMEDETOMIDINE HYDROCHLORIDE 0.2 MCG/KG/HR: 400 INJECTION INTRAVENOUS at 03:39

## 2023-04-27 RX ADMIN — MICONAZOLE NITRATE: 20 CREAM TOPICAL at 18:50

## 2023-04-27 RX ADMIN — PANTOPRAZOLE SODIUM 40 MG: 40 INJECTION, POWDER, FOR SOLUTION INTRAVENOUS at 08:11

## 2023-04-27 RX ADMIN — LEVALBUTEROL HYDROCHLORIDE 1.25 MG: 1.25 SOLUTION, CONCENTRATE RESPIRATORY (INHALATION) at 13:37

## 2023-04-27 RX ADMIN — CHLORHEXIDINE GLUCONATE 0.12% ORAL RINSE 15 ML: 1.2 LIQUID ORAL at 09:07

## 2023-04-27 RX ADMIN — THIAMINE HYDROCHLORIDE 500 MG: 100 INJECTION, SOLUTION INTRAMUSCULAR; INTRAVENOUS at 20:41

## 2023-04-27 RX ADMIN — MIDAZOLAM 1 MG/HR: 5 INJECTION INTRAMUSCULAR; INTRAVENOUS at 13:06

## 2023-04-27 RX ADMIN — NOREPINEPHRINE BITARTRATE 6 MCG/MIN: 1 INJECTION, SOLUTION, CONCENTRATE INTRAVENOUS at 03:43

## 2023-04-27 RX ADMIN — PHENOBARBITAL SODIUM 260 MG: 65 INJECTION INTRAMUSCULAR at 05:22

## 2023-04-27 RX ADMIN — DEXTROSE AND SODIUM CHLORIDE 125 ML/HR: 5; .9 INJECTION, SOLUTION INTRAVENOUS at 02:18

## 2023-04-27 RX ADMIN — POTASSIUM PHOSPHATE, MONOBASIC AND POTASSIUM PHOSPHATE, DIBASIC 30 MMOL: 224; 236 INJECTION, SOLUTION, CONCENTRATE INTRAVENOUS at 17:09

## 2023-04-27 RX ADMIN — FENTANYL CITRATE 25 MCG: 50 INJECTION, SOLUTION INTRAMUSCULAR; INTRAVENOUS at 20:14

## 2023-04-27 RX ADMIN — LEVALBUTEROL HYDROCHLORIDE 1.25 MG: 1.25 SOLUTION, CONCENTRATE RESPIRATORY (INHALATION) at 07:28

## 2023-04-27 RX ADMIN — METHYLPREDNISOLONE SODIUM SUCCINATE 40 MG: 40 INJECTION, POWDER, LYOPHILIZED, FOR SOLUTION INTRAMUSCULAR; INTRAVENOUS at 09:18

## 2023-04-27 RX ADMIN — CEFEPIME 2000 MG: 2 INJECTION, POWDER, FOR SOLUTION INTRAVENOUS at 13:14

## 2023-04-27 RX ADMIN — PROPOFOL 20 MCG/KG/MIN: 10 INJECTION, EMULSION INTRAVENOUS at 05:51

## 2023-04-27 RX ADMIN — METRONIDAZOLE 500 MG: 500 INJECTION, SOLUTION INTRAVENOUS at 08:13

## 2023-04-27 RX ADMIN — THIAMINE HYDROCHLORIDE 500 MG: 100 INJECTION, SOLUTION INTRAMUSCULAR; INTRAVENOUS at 09:22

## 2023-04-27 RX ADMIN — IPRATROPIUM BROMIDE 0.5 MG: 0.5 SOLUTION RESPIRATORY (INHALATION) at 13:37

## 2023-04-27 RX ADMIN — INSULIN LISPRO 1 UNITS: 100 INJECTION, SOLUTION INTRAVENOUS; SUBCUTANEOUS at 00:23

## 2023-04-27 RX ADMIN — VASOPRESSIN 0.04 UNITS/MIN: 20 INJECTION INTRAVENOUS at 00:00

## 2023-04-27 RX ADMIN — CEFEPIME 1000 MG: 1 INJECTION, POWDER, FOR SOLUTION INTRAMUSCULAR; INTRAVENOUS at 00:04

## 2023-04-27 RX ADMIN — POTASSIUM PHOSPHATE, MONOBASIC AND POTASSIUM PHOSPHATE, DIBASIC 21 MMOL: 224; 236 INJECTION, SOLUTION, CONCENTRATE INTRAVENOUS at 04:56

## 2023-04-27 RX ADMIN — FENTANYL CITRATE 25 MCG: 50 INJECTION, SOLUTION INTRAMUSCULAR; INTRAVENOUS at 16:59

## 2023-04-27 RX ADMIN — NOREPINEPHRINE BITARTRATE 4 MCG/MIN: 1 INJECTION, SOLUTION, CONCENTRATE INTRAVENOUS at 20:53

## 2023-04-27 RX ADMIN — NICOTINE 21 MG: 21 PATCH, EXTENDED RELEASE TRANSDERMAL at 09:07

## 2023-04-27 RX ADMIN — METRONIDAZOLE 500 MG: 500 INJECTION, SOLUTION INTRAVENOUS at 15:31

## 2023-04-27 RX ADMIN — CHLORHEXIDINE GLUCONATE 0.12% ORAL RINSE 15 ML: 1.2 LIQUID ORAL at 20:41

## 2023-04-27 RX ADMIN — PHENOBARBITAL SODIUM 130 MG: 65 INJECTION INTRAMUSCULAR at 03:13

## 2023-04-27 RX ADMIN — LEVALBUTEROL HYDROCHLORIDE 1.25 MG: 1.25 SOLUTION, CONCENTRATE RESPIRATORY (INHALATION) at 19:16

## 2023-04-27 RX ADMIN — FOLIC ACID 1000 MCG: 1 TABLET ORAL at 09:07

## 2023-04-27 RX ADMIN — MICONAZOLE NITRATE: 20 CREAM TOPICAL at 10:29

## 2023-04-27 RX ADMIN — INSULIN LISPRO 2 UNITS: 100 INJECTION, SOLUTION INTRAVENOUS; SUBCUTANEOUS at 18:03

## 2023-04-27 RX ADMIN — VASOPRESSIN 0.04 UNITS/MIN: 20 INJECTION INTRAVENOUS at 16:33

## 2023-04-27 RX ADMIN — FUROSEMIDE 40 MG: 10 INJECTION, SOLUTION INTRAMUSCULAR; INTRAVENOUS at 13:09

## 2023-04-27 RX ADMIN — PANTOPRAZOLE SODIUM 40 MG: 40 INJECTION, POWDER, FOR SOLUTION INTRAVENOUS at 20:53

## 2023-04-27 RX ADMIN — INSULIN LISPRO 2 UNITS: 100 INJECTION, SOLUTION INTRAVENOUS; SUBCUTANEOUS at 05:27

## 2023-04-27 NOTE — RESPIRATORY THERAPY NOTE
RT Ventilator Management Note  Bridget Lo 63 y o  male MRN: [id number]  Unit/Bed#: MICU 08 Encounter: [id number]      Daily Screen         4/26/2023  1835 4/27/2023  0731          Patient safety screen outcome[de-identified] Failed Failed      Not Ready for Weaning due to[de-identified] -- Underline problem not resolved      Spont breathing trial % for 30 min: No --                Physical Exam:   Assessment Type: During-treatment  General Appearance: Sedated  Respiratory Pattern: Assisted  Chest Assessment: Chest expansion asymmetrical  Bilateral Breath Sounds: Diminished, Clear  Cough: Productive  Suction: ET Tube  O2 Device: vent      Resp Comments: (P) Rec'd pt  on CMV setting, tolerating well  Pt  is sedated and restrained  Pt  sx for scant amounts of clerar secretions  Pt  currently sedated, SBT not indicated  Will re-evaluate if SAT is performed

## 2023-04-27 NOTE — PROGRESS NOTES
Nutrition recommendations:    1 ) Recommend fiber free TF formula d/t need for multiple pressors at present  Recommend Osmolite 1 0, start with trickle feeds and advance as tolerated by 10mL/hr q8 hrs to goal rate of 72mL/hr x24 hrs  This goal rate would provide 1831 kcals, 76g protein, 1443mL free water  2 ) Would recommend removing dextrose from IV fluids when able to start advancing TF to avoid overfeeding CHO calories  3 ) Recommend to monitor and replete electrolytes as needed  4 ) Will monitor ability to increase protein provision, starting at low end of estimated needs given elevated BUN/Creatinine      5 ) Continue folic acid, thiamine, and MVI given hx of alcohol abuse

## 2023-04-27 NOTE — PLAN OF CARE
Problem: MOBILITY - ADULT  Goal: Maintain or return to baseline ADL function  Description: INTERVENTIONS:  -  Assess patient's ability to carry out ADLs; assess patient's baseline for ADL function and identify physical deficits which impact ability to perform ADLs (bathing, care of mouth/teeth, toileting, grooming, dressing, etc )  - Assess/evaluate cause of self-care deficits   - Assess range of motion  - Assess patient's mobility; develop plan if impaired  - Assess patient's need for assistive devices and provide as appropriate  - Encourage maximum independence but intervene and supervise when necessary  - Involve family in performance of ADLs  - Assess for home care needs following discharge   - Consider OT consult to assist with ADL evaluation and planning for discharge  - Provide patient education as appropriate  Outcome: Progressing  Goal: Maintains/Returns to pre admission functional level  Description: INTERVENTIONS:  - Perform BMAT or MOVE assessment daily    - Set and communicate daily mobility goal to care team and patient/family/caregiver     - Collaborate with rehabilitation services on mobility goals if consulted  - Out of bed for toileting  - Record patient progress and toleration of activity level   Outcome: Progressing     Problem: Prexisting or High Potential for Compromised Skin Integrity  Goal: Skin integrity is maintained or improved  Description: INTERVENTIONS:  - Identify patients at risk for skin breakdown  - Assess and monitor skin integrity  - Assess and monitor nutrition and hydration status  - Monitor labs   - Assess for incontinence   - Turn and reposition patient  - Assist with mobility/ambulation  - Relieve pressure over bony prominences  - Avoid friction and shearing  - Provide appropriate hygiene as needed including keeping skin clean and dry  - Evaluate need for skin moisturizer/barrier cream  - Collaborate with interdisciplinary team   - Patient/family teaching  - Consider wound care consult   Outcome: Progressing     Problem: CARDIOVASCULAR - ADULT  Goal: Maintains optimal cardiac output and hemodynamic stability  Description: INTERVENTIONS:  - Monitor I/O, vital signs and rhythm  - Monitor for S/S and trends of decreased cardiac output  - Administer and titrate ordered vasoactive medications to optimize hemodynamic stability  - Assess quality of pulses, skin color and temperature  - Assess for signs of decreased coronary artery perfusion  - Instruct patient to report change in severity of symptoms  Outcome: Progressing  Goal: Absence of cardiac dysrhythmias or at baseline rhythm  Description: INTERVENTIONS:  - Continuous cardiac monitoring, vital signs, obtain 12 lead EKG if ordered  - Administer antiarrhythmic and heart rate control medications as ordered  - Monitor electrolytes and administer replacement therapy as ordered  Outcome: Progressing     Problem: RESPIRATORY - ADULT  Goal: Achieves optimal ventilation and oxygenation  Description: INTERVENTIONS:  - Assess for changes in respiratory status  - Assess for changes in mentation and behavior  - Position to facilitate oxygenation and minimize respiratory effort  - Oxygen administered by appropriate delivery if ordered  - Initiate smoking cessation education as indicated  - Encourage broncho-pulmonary hygiene including cough, deep breathe, Incentive Spirometry  - Assess the need for suctioning and aspirate as needed  - Assess and instruct to report SOB or any respiratory difficulty  - Respiratory Therapy support as indicated  Outcome: Progressing     Problem: GENITOURINARY - ADULT  Goal: Maintains or returns to baseline urinary function  Description: INTERVENTIONS:  - Assess urinary function  - Encourage oral fluids to ensure adequate hydration if ordered  - Administer IV fluids as ordered to ensure adequate hydration  - Administer ordered medications as needed  - Offer frequent toileting  - Follow urinary retention protocol if ordered  Outcome: Progressing  Goal: Absence of urinary retention  Description: INTERVENTIONS:  - Assess patient’s ability to void and empty bladder  - Monitor I/O  - Bladder scan as needed  - Discuss with physician/AP medications to alleviate retention as needed  - Discuss catheterization for long term situations as appropriate  Outcome: Progressing  Goal: Urinary catheter remains patent  Description: INTERVENTIONS:  - Assess patency of urinary catheter  - If patient has a chronic hernandez, consider changing catheter if non-functioning  - Follow guidelines for intermittent irrigation of non-functioning urinary catheter  Outcome: Progressing     Problem: METABOLIC, FLUID AND ELECTROLYTES - ADULT  Goal: Electrolytes maintained within normal limits  Description: INTERVENTIONS:  - Monitor labs and assess patient for signs and symptoms of electrolyte imbalances  - Administer electrolyte replacement as ordered  - Monitor response to electrolyte replacements, including repeat lab results as appropriate  - Instruct patient on fluid and nutrition as appropriate  Outcome: Progressing  Goal: Fluid balance maintained  Description: INTERVENTIONS:  - Monitor labs   - Monitor I/O and WT  - Instruct patient on fluid and nutrition as appropriate  - Assess for signs & symptoms of volume excess or deficit  Outcome: Progressing  Goal: Glucose maintained within target range  Description: INTERVENTIONS:  - Monitor Blood Glucose as ordered  - Assess for signs and symptoms of hyperglycemia and hypoglycemia  - Administer ordered medications to maintain glucose within target range  - Assess nutritional intake and initiate nutrition service referral as needed  Outcome: Progressing     Problem: SKIN/TISSUE INTEGRITY - ADULT  Goal: Skin Integrity remains intact(Skin Breakdown Prevention)  Description: Assess:  -Assess extremities for adequate circulation and sensation     Bed Management:  -Have minimal linens on bed & keep smooth, unwrinkled  -Change linens as needed when moist or perspiring    Toileting:  -Offer bedside commode    Activity:  -Encourage activity and walks on unit  -Encourage or provide ROM exercises   -Use appropriate equipment to lift or move patient in bed    Skin Care:  -Avoid use of baby powder, tape, friction and shearing, hot water or constrictive clothing  -Do not massage red bony areas    Next Steps:  Outcome: Progressing  Goal: Incision(s), wounds(s) or drain site(s) healing without S/S of infection  Description: INTERVENTIONS  - Assess and document dressing, incision, wound bed, drain sites and surrounding tissue  - Provide patient and family education  Outcome: Progressing  Goal: Pressure injury heals and does not worsen  Description: Interventions:  - Implement low air loss mattress or specialty surface (Criteria met)  - Apply silicone foam dressing  - Apply fecal or urinary incontinence containment device   - Utilize friction reducing device or surface for transfers   - Consider nutrition services referral as needed  Outcome: Progressing     Problem: HEMATOLOGIC - ADULT  Goal: Maintains hematologic stability  Description: INTERVENTIONS  - Assess for signs and symptoms of bleeding or hemorrhage  - Monitor labs  - Administer supportive blood products/factors as ordered and appropriate  Outcome: Progressing     Problem: Nutrition/Hydration-ADULT  Goal: Nutrient/Hydration intake appropriate for improving, restoring or maintaining nutritional needs  Description: Monitor and assess patient's nutrition/hydration status for malnutrition  Collaborate with interdisciplinary team and initiate plan and interventions as ordered  Monitor patient's weight and dietary intake as ordered or per policy  Utilize nutrition screening tool and intervene as necessary  Determine patient's food preferences and provide high-protein, high-caloric foods as appropriate       INTERVENTIONS:  - Monitor oral intake, urinary output, labs, and treatment plans  - Assess nutrition and hydration status and recommend course of action  - Evaluate amount of meals eaten  - Assist patient with eating if necessary   - Allow adequate time for meals  - Recommend/ encourage appropriate diets, oral nutritional supplements, and vitamin/mineral supplements  - Order, calculate, and assess calorie counts as needed  - Recommend, monitor, and adjust tube feedings and TPN/PPN based on assessed needs  - Assess need for intravenous fluids  - Provide specific nutrition/hydration education as appropriate  - Include patient/family/caregiver in decisions related to nutrition  Outcome: Progressing     Problem: SAFETY,RESTRAINT: NV/NON-SELF DESTRUCTIVE BEHAVIOR  Goal: Remains free of harm/injury (restraint for non violent/non self-detsructive behavior)  Description: INTERVENTIONS:  - Instruct patient/family regarding restraint use   - Assess and monitor physiologic and psychological status   - Provide interventions and comfort measures to meet assessed patient needs   - Identify and implement measures to help patient regain control  - Assess readiness for release of restraint   Outcome: Progressing  Goal: Returns to optimal restraint-free functioning  Description: INTERVENTIONS:  - Assess the patient's behavior and symptoms that indicate continued need for restraint  - Identify and implement measures to help patient regain control  - Assess readiness for release of restraint   Outcome: Progressing

## 2023-04-27 NOTE — SEPSIS NOTE
"  Sepsis Note   Trinity Pritesh 63 y o  male MRN: [id number]  Unit/Bed#: MICU 08 Encounter: [id number]          [address] (last 5 hours)     Sepsis Reassess     Row Name 04/27/23 1145                   Repeat Volume Status and Tissue Perfusion Assessment Performed    Date of Reassessment: 04/25/23  -        Time of Reassessment: 1620  -BK        Sepsis Reassessment Note: Click \"NEXT\" below (NOT \"close\") to generate sepsis reassessment note  YES (proceed by clicking \"NEXT\")  -BK        Repeat Volume Status and Tissue Perfusion Assessment Performed --              User Key  (r) = Recorded By, (t) = Taken By, (c) = Cosigned By    [address] Name Provider Type    HILARY Shay DO Physician                Body mass index is 27 28 kg/m²    Wt Readings from Last 1 Encounters:   04/26/23 76 7 kg (169 lb)     IBW (Ideal Body Weight): 63 8 kg    Ideal body weight: 63 8 kg (140 lb 10 5 oz)  Adjusted ideal body weight: 68 9 kg (151 lb 15 9 oz)  "

## 2023-04-27 NOTE — PROGRESS NOTES
Vancomycin IV Pharmacy-to-Dose Consultation    Taco Mcleod is a 63 y o  male who is currently receiving Vancomycin IV with management by the Pharmacy Consult service for the treatment of concern for septic shock  Assessment/Plan:    The patient's chart was reviewed  DEBBY is stable  There are no signs or symptoms of infusion reactions documented  Based on today’s assessment and supratherapeutic level of 23 8, will continue current vancomycin (Day # 3) dosing of 1000 mg IV daily PRN when random level is less than 15, with a plan for a random level to be drawn at 0600 on 4/28  Hold further vancomycin dosing today  Pharmacy will continue to follow closely for s/sx of nephrotoxicity, infusion reactions and appropriateness of therapy  BMP and CBC will be ordered per protocol  We will continue to follow the patient’s culture results and clinical progress daily        Lucy Nguyen, PharmD, [address] Clinical Pharmacist  [phone]

## 2023-04-27 NOTE — SEPSIS NOTE
"  Sepsis Note   Fili Erickson 63 y o  male MRN: [id number]  Unit/Bed#: MICU 08 Encounter: [id number]       Initial Sepsis Screening     [address] Name 04/25/23 1407                Is the patient's history suggestive of a new or worsening infection? Yes (Proceed)  -BK        Suspected source of infection suspect infection, source unknown  -BK        Indicate SIRS criteria Leukocytosis (WBC > 79447 IJL) OR Leukopenia (WBC <4000 IJL) OR Bandemia (WBC >10% bands)  -BK        Are two or more of the above signs & symptoms of infection both present and new to the patient? Yes (Proceed)  -BK        Assess for evidence of organ dysfunction: Are any of the below criteria present within 6 hours of suspected infection and SIRS criteria that are NOT considered to be chronic conditions? SBP < 90;Lactate >/equal 4 0  -BK        Date of presentation of septic shock 04/25/23  -BK        Time of presentation of septic shock 1500  -BK        Fluid Resuscitation: A lesser volume than 30 ml/kg IV fluid will be given  -BK        The 30 mL/kg fluid bolus was not given to the patient despite having hypotension, a lactate of >= 4 mmol/L, or documentation of septic shock secondary to: Concern for fluid/volume overload  -BK        Instead of the 30 ml/kg fluid bolus, the following volume of crystalloid fluid will be ordered: 2L  -BK        Of the following fluid type: NSS  -BK        Is the patient is persistently hypotensive in the hour after fluid bolus administration? If yes, patient meets criteria for vasopressor use  YES  -BK        Sepsis Note: Click \"NEXT\" below (NOT \"close\") to generate sepsis note based on above information   YES (proceed by clicking \"NEXT\")  -BK              User Key  (r) = Recorded By, (t) = Taken By, (c) = Cosigned By    [address] Name Provider Type    BK Lizandro Pottertein,  Physician                Default Flowsheet Data (last 720 hours)     Sepsis Reassess     Row Name 04/27/23 0526                   Repeat " "Volume Status and Tissue Perfusion Assessment Performed    Date of Reassessment: 04/25/23  -HILARY        Time of Reassessment: 7749  -BK        Sepsis Reassessment Note: Click \"NEXT\" below (NOT \"close\") to generate sepsis reassessment note  YES (proceed by clicking \"NEXT\")  -HILARY        Repeat Volume Status and Tissue Perfusion Assessment Performed --              User Key  (r) = Recorded By, (t) = Taken By, (c) = Cosigned By    [address] Name Provider Type    BK Amee Shay DO Physician                Body mass index is 27 28 kg/m²    Wt Readings from Last 1 Encounters:   04/26/23 76 7 kg (169 lb)     IBW (Ideal Body Weight): 63 8 kg    Ideal body weight: 63 8 kg (140 lb 10 5 oz)  Adjusted ideal body weight: 68 9 kg (151 lb 15 9 oz)  "

## 2023-04-27 NOTE — OCCUPATIONAL THERAPY NOTE
Occupational Therapy Cancel Note         04/27/23 1341   OT Last Visit   OT Visit Date 04/27/23   Note Type   Note type Cancelled Session   Cancel Reasons Intubated/sedated       Paul Leong, OT

## 2023-04-27 NOTE — PROCEDURES
Intubation    Date/Time: 4/27/2023 5:57 AM  Performed by: Army Yoseph MD  Authorized by: Army Yoseph MD     Patient location:  Bedside  Consent:     Consent obtained:  Emergent situation  Universal protocol:     Relevant documents present and verified: yes      Test results available and properly labeled: yes      Radiology Images displayed and confirmed  If images not available, report reviewed: yes      Required blood products, implants, devices, and special equipment available: yes      Immediately prior to procedure, a time out was called: yes      Patient identity confirmed:  Arm band  Pre-procedure details:     Patient status:  Unresponsive    Pretreatment medications:  Etomidate    Paralytics:  Succinylcholine  Indications:     Indications for intubation: respiratory distress, respiratory failure, airway protection and hypercapnia    Procedure details:     Preoxygenation:  BiPAP    CPR in progress: no      Intubation method:  Oral    Oral intubation technique:  Glidescope    Laryngoscope blade: Mac 4    Tube size (mm):  7 5    Tube type:  Cuffed    Number of attempts:  2    Ventilation between attempts: yes      Cricoid pressure: no      Tube visualized through cords: yes    Placement assessment:     ETT to lip:  22    Tube secured with:  ETT rowan    Breath sounds:  Absent over the epigastrium and equal    Placement verification: chest rise, condensation, colorimetric ETCO2 device, CXR verification, direct visualization, equal breath sounds, ETCO2 detector, tube exhalation and capnography      CXR findings:  ETT in proper place  Post-procedure details:     Patient tolerance of procedure:   Tolerated well, no immediate complications

## 2023-04-27 NOTE — PROGRESS NOTES
Progress note - Palliative and Supportive Care   Aster Kidd 63 y o  male [id number]    Patient Active Problem List   Diagnosis   • Crohn disease [facility])   • Spinal stenosis of cervical region   • Pericarditis   • Coronary artery disease involving native coronary artery of native heart without angina pectoris   • Atherosclerosis of coronary artery   • Stage 4 very severe COPD by GOLD classification ([facility] )   • Dyslipidemia   • Lactic acidosis   • High anion gap metabolic acidosis   • Hyponatremia   • Alcohol abuse   • Cigarette nicotine dependence without complication   • Agitation   • Heart failure, systolic, due to idiopathic cardiomyopathy (HCC)   • Moderate protein-calorie malnutrition (HCC)   • Macrocytic anemia   • Hypocalcemia   • Perforation of colon (HCC)   • Hypotension   • DEBBY (acute kidney injury) ([facility] )   • Presence of drug-eluting stent in right coronary artery   • Paroxysmal atrial fibrillation (HCC)   • Hypercalcemia   • Hematemesis with nausea   • Status post reversal of ileostomy   • Screening for diabetes mellitus   • Shock ([facility] )   • Acute respiratory failure with hypoxia (HCC)   • End stage COPD ([facility] )   • Hyperglycemia   • Abnormal liver function   • Hyperammonemia ([facility] )     Active issues specifically addressed today include:   Acute hypoxic respiratory failure  Shock state  CKD  Alcohol abuse  Palliative care    Plan:  1  Symptom management - Per Primary team       2  Goals -   - Spoke to Daughter Ivis Pena today, she informed me that her father does have a will  She will take it to the hospital for us to make a copy     -Additionally she reports that he documented in his will and had verbalized to her, that he would want everything to be done medically to keep him alive, including CPR and intubation      -Spoke to MICU attending Dr Anika Buenrostro, we discussed that in light of  focusing on medical management(as per patient wishes)  And his daughter desire for placement into rehab, if and when medically stable, we will sign off at this time  Please do not hesitate to reach our on call provider through our clinic answering service at  should you have acute symptom control concerns           Code Status: Full - Level 1   Decisional apparatus:  Patient is not competent on my exam today  If competence is lost, patient's substitute decision maker would default to his adult children by [place]  Advance Directive / Living Will / POLST:  Daughter Denisse Camacho reports that he does have a will  She will bring it in when she comes to visit for us to make a copy into the chart  Interval history:       Overnight patient was intubated for airway management  Based on Family conversation yesterday, it appears that he has poor insight on his overall state of health  Based on phone conversation yesterday with Palliative , Daughter Denisse Camacho would like medically management to be continue with ultimately goal of patient getting into rehab for alcoholism  MEDICATIONS / ALLERGIES:     all current active meds have been reviewed    Allergies   Allergen Reactions   • Medical Tape    • Other      Brown cloth band aids      • Latex Rash       OBJECTIVE:    Physical Exam  Physical Exam  Constitutional:       Appearance: He is ill-appearing  Comments: Sedated and intubated   Cardiovascular:      Rate and Rhythm: Normal rate and regular rhythm  Pulmonary:      Comments: Mechanically ventilated  Abdominal:      General: Bowel sounds are normal       Palpations: There is no mass  Hernia: No hernia is present           Lab Results:   CBC:   Lab Results   Component Value Date    WBC 8 81 04/27/2023    HGB 7 2 (L) 04/27/2023    HCT 21 3 (L) 04/27/2023     (H) 04/27/2023    PLT 83 (L) 04/27/2023    MCH 33 8 04/27/2023    MCHC 33 8 04/27/2023    RDW 14 1 04/27/2023    MPV 9 6 04/27/2023    NRBC 1 04/27/2023   , BMP:  Lab Results   Component Value Date    SODIUM 132 (L) 04/27/2023    K 3 9 04/27/2023     04/27/2023    CO2 24 04/27/2023    BUN 30 (H) 04/27/2023    CREATININE 1 97 (H) 04/27/2023    GLUC 229 (H) 04/27/2023    CALCIUM 7 0 (L) 04/27/2023    AGAP 5 04/27/2023    EGFR 35 04/27/2023     Imaging Studies:   EKG, Pathology, and Other Studies:     Counseling / Coordination of Care    Total floor / unit time spent today 25+ minutes  Greater than 50% of total time was spent with the patient and / or family counseling and / or coordination of care  A description of the counseling / coordination of care: This note was  shared with the patient

## 2023-04-27 NOTE — PROGRESS NOTES
Daily Progress Note - Critical Care   Debbie Gonsalez 63 y o  male MRN: [id number]  Unit/Bed#: MICU 08 Encounter: [id number]        ----------------------------------------------------------------------------------------  HPI    63year old male PMH of alcohol abuse with prior DTs, end-stage COPD, Crohn's disease s/p ileostomy and reversal in 2018, CAD with MI in 2012 with stent in RCA presented to [facility] ED on 4/25 with complaint of shortness of breath for several days  He was hypotensive, tachycardic, and tachypneic with an elevated lactate of 20  Patient required pressor support and BiPAP, subsequently transferred to ICU for further management  24hr events:   Intubated 4/26 for airway protection  Received 520 mg (130/130/260) phenobarbital overnight  6-7 beat run of vtach, electrolytes were checked, hypophosphatemia @ 1 4, replaced with 21 mmol potassium phosphate  Remains on levo @ 6 and vaso @ 0 04  Propofol @20  Precedex @0 20      ---------------------------------------------------------------------------------------  SUBJECTIVE  Patient intubated and sedated  Review of Systems  Review of systems was reviewed and negative unless stated above in HPI/24-hour events   ---------------------------------------------------------------------------------------  Assessment and Plan:    Neuro:   • Diagnosis: Alcohol abuse   o Ethanol 4/25: 45  o Ethylene glycol and methanol 4/25: negative   o UDS 4/25: negative   o Last drink 4/25 prior to EMS arrival  o Phenobarbital  - 4/25: 158 mg  - 4/26: 230/650/130/130   - 4/27: 130/260   o Plan:   - [facility] protocol  - Thiamine, folate   - Fomepizole started 4/25, discontinued 4/26   - Monitor for alcohol withdrawal   - Toxicology consulted - total phenobarbital dosing recommended max of 2 to 2 5g  If reaching 2g and need additional treatment, adjunctive measures could include precedex and ketamine, as needed benzodiazepines     • Diagnosis: Delirium ppx   o Plan:   - CAM ICU  Sleep-wake regulation  CV:   • Diagnosis: Undifferentiated shock  o Suspect septic shock   o Received 30 cc/kg IVF bolus  o TTE 3/42/3418: systolic function mildly reduced to low normal  EF 52%  o Plan:   - Wean levophed and vaso, maintain MAP >65  - Continue cefepime/flagyl/vanc     • Diagnosis: CAD with MI in 2012 s/p RCA stent   o Home atorvastatin 40mg daily   o Plan:   - Continue home statin       Pulm:  • Diagnosis: Acute hypoxic respiratory failure   o Pulmonary edema vs worsening COPD   o BNP 3297 (330 in 2016)   o CT CAP 4/25: moderate centrilobar emphysema  A 2 mm right middle lobe pulmonary nodule (4/147) and a 3 mm right lower lobe solid pulm nodule (4/193), stable since 5/15/2018, therefore consistent with a benign etiology  No new or enlarging pulmonary nodules  o Intubated 4/26 for airway protection and worsening hypercapnia   o Plan:   - Continue mechanical ventilation  Daily SAT/SBT   • Diagnosis: End-stage COPD   o Home albuterol and ipratropium   o Plan:   - Continue atrovent/xopenex TID   - Start solumedrol 40mg BID 4/27       GI:   • Diagnosis: Abnormal liver function   o , ALT 66, Alk phos 263, T bili 1 10  o CT CAP 4/25: hepatomegaly and severe hepatic steatosis   o Chronic hepatitis panel 4/25: negative   o RUQ U/S 4/27: pending  o Plan:   - Trend LFTs, INR    • Diagnosis: Hyperammonemia (resolved)   o Ammonia POA 82, most recent 32 this AM   o Plan:   - Discontinue lactulose  • Diagnosis: Crohn's disease   ? H/o transverse colon perforation s/p ileostomy and reversal in 2018  ? Currently does not take any medications for Crohns   ? CT CAP 4/25: no bowel obstruction  Underdistended descending colon  Postsurgical changes of partial right hemicolectomy  • Diagnosis: Bloody NGT output  ? Patient evaluated by acute care surgery in the ED, NGT was inserted due to concern for SBO vs ischemic bowel, ~100 cc of bloody output seen in cannula      ? CT CAP 4/25: circumferential mural thickening of esophagus with an air-fluid level, may be related to esophagitis and esophageal dysmotility  o Plan:   - Monitor NGT output and trend H&H   • Diagnosis: Elevated lipase and amylase   o Possible chronic alcoholic pancreatitis   o Lipase 4/25: 2090 --> 595 on 4/26  Amylase 4/25: 155  o Plan:   - F/u RUQ ultrasound       :   • Diagnosis: DEBBY   o Unclear etiology at this time   o Cr 2 09, down from 2 87 on presentation  Baseline 0 9-1 2   o Plan:   - Hernandez in place, strict I/Os   - Trend BUN/Cr   • Diagnosis: Lactic acidosis (improving)   o Sepsis vs other shock vs albuterol   o Lactic acid POA 20, down to 2 6 yesterday 4/26  o Plan:   - Trend lactate   • Diagnosis: High anion gap metabolic acidosis (resolved)   o Secondary to lactic acidosis   o VBG 4/25: pH 7 07, pCO2 39 5, HCO3 11 2   o ABG 4/25: pH 7 286, pCO2 38 9, HCO3 18 1    o AG was 28 4/25, 7 this AM  • Diagnosis: Respiratory acidosis   o  VBG 4/27: pH 7 236, pCO2 57 8, HCO3 24      F/E/N:   • Diagnosis: Hyponatremia  o Possible beer potomania   o Urine osm 487, urine Na <5, serum osm 281   o Plan:   - BMP q4h   • Fluids: D5NS @ 125/hr   • Replete electrolytes as needed   • Nutrition: NPO      Heme/Onc:   • Diagnosis: Macrocytic anemia   o Low folate 2 3   o Vitamin B12 wnl (682)  o Plan:   - Folate 1000 mcg daily   - Trend H&H and monitor for s/s bleeding   • Diagnosis: DVT ppx   o Plan: SCDs       Endo:   • Diagnosis: Hyperglycemia   o A1c 4/25: 8  o Plan:  - SSI       ID:   • Diagnosis: Septic shock   o No clear source at this time   o U/A microscopic 4/25: small leukocytes, negative nitrite, no bacteria, 30-50 WBC    o Urine cx 4/25: pending   o Blood cx 4/25: pending   o MRSA 4/25: positive   o Plan:   - Continue cefepime/flagyl/vanc, de-escalate once cultures result       MSK/Skin:   • Diagnosis: DTI ppx   o Plan: Frequent repositioning   PT/OT and OOB as tolerated       Disposition: Continue Critical Care   Code Status: Level 1 - Full Code  ---------------------------------------------------------------------------------------  ICU CORE MEASURES    Prophylaxis   VTE Pharmacologic Prophylaxis: N/A  VTE Mechanical Prophylaxis: sequential compression device  Stress Ulcer Prophylaxis: Prophylaxis Not Indicated     ABCDE Protocol (if indicated)  Plan to perform spontaneous awakening trial today? Yes  Plan to perform spontaneous breathing trial today? Yes  Obvious barriers to extubation? Not applicable  CAM-ICU: Negative    Invasive Devices Review  Invasive Devices     Central Venous Catheter Line  Duration           CVC Central Lines 23 Triple 16cm 1 day          Peripheral Intravenous Line  Duration           Peripheral IV 23 Left Antecubital 2 days          Drain  Duration           Rectal Tube With balloon 1 day    Urethral Catheter Temperature probe 1 day    NG/OG/Enteral Tube 16 Fr Right nare <1 day          Airway  Duration           ETT  Oral <1 day              Can any invasive devices be discontinued today? No  ---------------------------------------------------------------------------------------  OBJECTIVE    Vitals   Vitals:    23 0400 23 0500 23 0530 23 0600   BP: 118/70 90/57  92/58   BP Location: Left arm      Pulse: 82 104  74   Resp: 20 19  20   Temp: 99 3 °F (37 4 °C) 100 °F (37 8 °C)  100 °F (37 8 °C)   TempSrc: Bladder      SpO2: 100% 97%  99%   Weight:   79 8 kg (175 lb 14 8 oz)    Height:         Temp (24hrs), Av 3 °F (36 8 °C), Min:97 2 °F (36 2 °C), Max:100 °F (37 8 °C)  Current: Temperature: 100 °F (37 8 °C)    Respiratory:  SpO2: SpO2: 99 %    Invasive/non-invasive ventilation settings   Respiratory    Lab Data (Last 4 hours)    None         O2/Vent Data (Last 4 hours)    None                Physical Exam  Vitals and nursing note reviewed  Constitutional:       General: He is not in acute distress  HENT:      Head: Normocephalic and atraumatic        Nose:      Comments: NGT in place      Mouth/Throat:      Comments: ETT in place   Cardiovascular:      Rate and Rhythm: Normal rate and regular rhythm  Pulses: Normal pulses  Pulmonary:      Comments: On mechanical ventilation   Abdominal:      Palpations: Abdomen is soft  Hernia: A hernia is present  Genitourinary:     Comments: Hernandez in place   Skin:     General: Skin is warm and dry  Capillary Refill: Capillary refill takes less than 2 seconds  Neurological:      Mental Status: He is alert        Comments: Sedated on propofol              Laboratory and Diagnostics:  Results from last 7 days   Lab Units 04/26/23  1355 04/26/23  0429 04/25/23  1346 04/25/23  1045   WBC Thousand/uL  --  10 52*  --  20 57*   HEMOGLOBIN g/dL 8 0* 8 2* 11 4* 12 1   HEMATOCRIT % 23 4* 24 3* 33 7* 37 7   PLATELETS Thousands/uL  --  74*  --  155   NEUTROS PCT %  --  86*  --  85*   MONOS PCT %  --  7  --  7     Results from last 7 days   Lab Units 04/27/23  0452 04/27/23  0450 04/27/23  0221 04/26/23  2239 04/26/23  1800 04/26/23  1355 04/26/23  0850 04/25/23  1801 04/25/23  1704 04/25/23  1045   SODIUM mmol/L 130* 130* 128* 128* 128* 125* 126*   < > 133* 125*   POTASSIUM mmol/L 3 8 3 8 3 8 4 0 4 1 4 0 4 1   < > 3 8 4 7   CHLORIDE mmol/L 99 99 97 98 98 94* 95*   < > 106 86*   CO2 mmol/L 21 21 24 24 26 25 23   < > 11* 11*   ANION GAP mmol/L 10 10 7 6 4 6 8   < > 16* 28*   BUN mg/dL 31* 33* 32* 31* 33* 34* 33*   < > 27* 33*   CREATININE mg/dL 2 08* 2 09* 1 99* 2 22* 2 29* 2 38* 2 44*   < > 2 03* 2 87*   CALCIUM mg/dL 7 7* 7 7* 6 9* 7 0* 7 1* 7 4* 7 5*   < > 5 4* 8 5   GLUCOSE RANDOM mg/dL 205* 203* 173* 170* 234* 234* 149*   < > 118 226*   ALT U/L  --  53  --   --   --   --  54  --  47 66   AST U/L  --  134*  --   --   --   --  140*  --  147* 204*   ALK PHOS U/L  --  158*  --   --   --   --  168*  --  168* 263*   ALBUMIN g/dL  --  1 7*  --   --   --   --  1 9*  --  1 5* 2 1*   TOTAL BILIRUBIN mg/dL  --  1 19*  --   --   --   --  1 38*  --  1 28* 1 10*    < > = values in this interval not displayed  Results from last 7 days   Lab Units 04/27/23  0450 04/27/23  0221 04/26/23  1228 04/26/23  0429   MAGNESIUM mg/dL 2 5 2 3 2 3 1 1*   PHOSPHORUS mg/dL 1 9* 1 4*  --  2 8      Results from last 7 days   Lab Units 04/27/23  0557 04/26/23  0429 04/25/23  1045   INR  1 35* 1 30* 1 21*   PTT seconds  --   --  28          Results from last 7 days   Lab Units 04/26/23  0850 04/26/23  0543 04/26/23  0327 04/26/23  0010 04/25/23  1704 04/25/23  1627 04/25/23  1430   LACTIC ACID mmol/L 2 6* 4 1* 5 9* 6 6* 10 2* 11 7* 12 9*     ABG:  Results from last 7 days   Lab Units 04/26/23  1856 04/25/23  1802   PH ART  7 326* 7 286*   PCO2 ART mm Hg 50 7* 38 9   PO2 ART mm Hg 163 2* 296 1*   HCO3 ART mmol/L 25 9 18 1*   BASE EXC ART mmol/L -0 3 -7 9   ABG SOURCE   --  Line, Arterial     VBG:  Results from last 7 days   Lab Units 04/27/23  0450 04/26/23  1701 04/25/23  1802   PH SANAZ  7 236*   < >  --    PCO2 SANAZ mm Hg 57 8*   < >  --    PO2 SANAZ mm Hg 53 6*   < >  --    HCO3 SANAZ mmol/L 24 0   < >  --    BASE EXC SANAZ mmol/L -3 6   < >  --    ABG SOURCE   --   --  Line, Arterial    < > = values in this interval not displayed  Results from last 7 days   Lab Units 04/25/23  1045   PROCALCITONIN ng/ml 1 73*       Micro  Results from last 7 days   Lab Units 04/25/23  1713 04/25/23  1607 04/25/23  1112 04/25/23  1045   BLOOD CULTURE   --   --  No Growth at 24 hrs  No Growth at 24 hrs  URINE CULTURE   --  Culture too young- will reincubate  --   --    MRSA CULTURE ONLY  Methicillin Resistant Staphylococcus aureus isolated*  --   --   --        Imaging: I have personally reviewed pertinent reports        Intake and Output  I/O       04/24 0701 04/25 0700 04/25 0701 04/26 0700    I V  (mL/kg)  1837 8 (23 9)    NG/GT  0    IV Piggyback  2950    Total Intake(mL/kg)  4787 8 (62 2)    Urine (mL/kg/hr)  310    Emesis/NG output  2000    Stool  600    Total Output  2910    Net  +1877 8 "         Height and Weights   Height: 5' 6\" (167 6 cm)  IBW (Ideal Body Weight): 63 8 kg  Body mass index is 28 4 kg/m²  Weight (last 2 days)     Date/Time Weight    04/27/23 0530 79 8 (175 93)    04/26/23 0714 76 7 (169)    04/26/23 0551 77 (169 75)    04/25/23 1455 68 5 (151 02)            Nutrition       Diet Orders   (From admission, onward)             Start     Ordered    04/27/23 0153  Diet NPO  Diet effective now        References:    Nutrtion Support Algorithm Enteral vs  Parenteral   Question Answer Comment   Diet Type NPO    RD to adjust diet per protocol?  Yes        04/27/23 0152                  Active Medications  Scheduled Meds:  Current Facility-Administered Medications   Medication Dose Route Frequency Provider Last Rate   • acetaminophen  650 mg Oral Q6H PRN Selma Narayan MD     • albuterol  10 mg Nebulization Once Janusz Ragsdale MD     • albuterol  2 5 mg Nebulization Q4H PRN Yosef Clark MD     • cefepime  1,000 mg Intravenous Q12H Deisi Millard DO Stopped (04/27/23 0200)   • chlorhexidine  15 mL Mouth/Throat Q12H [address] Janusz Ragsdale MD     • dexmedetomidine  0 1-0 7 mcg/kg/hr Intravenous Titrated George Gamez MD 0 2 mcg/kg/hr (04/27/23 0339)   • dextrose 5 % and sodium chloride 0 9 %  125 mL/hr Intravenous Continuous Keyla Deepa Yext,  mL/hr (93/10/35 3502)   • folic acid  8,477 mcg Oral Daily Janusz Ragsdale MD     • insulin lispro  1-6 Units Subcutaneous Q6H [address] Roddy Lester MD     • ipratropium  0 5 mg Nebulization TID Yosef Clark MD     • levalbuterol  1 25 mg Nebulization TID Yosef Clark MD     • methylPREDNISolone sodium succinate  40 mg Intravenous Q12H [address] Shena Hammond DO     • metroNIDAZOLE  500 mg Intravenous Q8H Deisi Millard DO Stopped (04/27/23 0200)   • ANSHU ANTIFUNGAL   Topical BID AMALIA Blue     • multivitamin stress formula  1 tablet Oral Daily Janusz Ragsdale MD     • nicotine  21 mg Transdermal Daily George Gamez MD     • norepinephrine  1-30 mcg/min " "Intravenous Titrated Val Crooks MD 6 mcg/min (04/27/23 0343)   • pantoprazole  40 mg Intravenous Q12H [address] Ninoska Yusuf MD     • phenol  1 spray Mouth/Throat Q2H PRN Piper Sampson MD     • potassium phosphate  21 mmol Intravenous Once Leobardo MD Tarah 21 mmol (04/27/23 0456)   • propofol  5-50 mcg/kg/min Intravenous Titrated Ruslan Crane MD 20 mcg/kg/min (04/27/23 0551)   • thiamine  500 mg Intravenous TID Ninoska Yusuf MD Stopped (04/26/23 2200)   • [START ON 4/29/2023] thiamine  100 mg Oral Daily Ninoska Yusuf MD     • vancomycin  15 mg/kg Intravenous Daily PRN Ninoska Yusuf MD     • vasopressin  0 04 Units/min Intravenous Continuous Piper Sampson MD 0 04 Units/min (04/27/23 0000)     Continuous Infusions:  dexmedetomidine, 0 1-0 7 mcg/kg/hr, Last Rate: 0 2 mcg/kg/hr (04/27/23 0339)  dextrose 5 % and sodium chloride 0 9 %, 125 mL/hr, Last Rate: 125 mL/hr (04/27/23 0218)  norepinephrine, 1-30 mcg/min, Last Rate: 6 mcg/min (04/27/23 0343)  propofol, 5-50 mcg/kg/min, Last Rate: 20 mcg/kg/min (04/27/23 0551)  vasopressin, 0 04 Units/min, Last Rate: 0 04 Units/min (04/27/23 0000)      PRN Meds:   acetaminophen, 650 mg, Q6H PRN  albuterol, 2 5 mg, Q4H PRN  phenol, 1 spray, Q2H PRN  vancomycin, 15 mg/kg, Daily PRN        Allergies   Allergies   Allergen Reactions   • Medical Tape    • Other      Brown cloth band aids      • Latex Rash     ---------------------------------------------------------------------------------------  Advance Directive and Living Will:      Power of :    POLST:    ---------------------------------------------------------------------------------------    Sharon Brar MD      Portions of the record may have been created with voice recognition software  Occasional wrong word or \"sound a like\" substitutions may have occurred due to the inherent limitations of voice recognition software    Read the chart carefully and recognize, using context, where substitutions " have occurred

## 2023-04-27 NOTE — PHYSICAL THERAPY NOTE
Physical Therapy Cancellation Note    PT orders received chart review completed  Pt is currently intubated/sedated and not appropriate to participate in skilled PT at this time  PT will follow and eval as medically appropriate       04/27/23 0204   Note Type   Note type Cancelled Session   Cancel Reasons Intubated/sedated       Francisco Boland, SYLVIE

## 2023-04-27 NOTE — PLAN OF CARE
Problem: MOBILITY - ADULT  Goal: Maintain or return to baseline ADL function  Description: INTERVENTIONS:  -  Assess patient's ability to carry out ADLs; assess patient's baseline for ADL function and identify physical deficits which impact ability to perform ADLs (bathing, care of mouth/teeth, toileting, grooming, dressing, etc )  - Assess/evaluate cause of self-care deficits   - Assess range of motion  - Assess patient's mobility; develop plan if impaired  - Assess patient's need for assistive devices and provide as appropriate  - Encourage maximum independence but intervene and supervise when necessary  - Involve family in performance of ADLs  - Assess for home care needs following discharge   - Consider OT consult to assist with ADL evaluation and planning for discharge  - Provide patient education as appropriate  Outcome: Progressing  Goal: Maintains/Returns to pre admission functional level  Description: INTERVENTIONS:  - Perform BMAT or MOVE assessment daily    - Set and communicate daily mobility goal to care team and patient/family/caregiver  - Collaborate with rehabilitation services on mobility goals if consulted  - Perform Range of Motion 5 times a day  - Reposition patient every 2 hours    - Dangle patient  times a day  - Stand patient  times a day  - Ambulate patient  times a day  - Out of bed to chair  times a day   - Out of bed for meals  times a day  - Out of bed for toileting  - Record patient progress and toleration of activity level   Outcome: Progressing     Problem: Prexisting or High Potential for Compromised Skin Integrity  Goal: Skin integrity is maintained or improved  Description: INTERVENTIONS:  - Identify patients at risk for skin breakdown  - Assess and monitor skin integrity  - Assess and monitor nutrition and hydration status  - Monitor labs   - Assess for incontinence   - Turn and reposition patient  - Assist with mobility/ambulation  - Relieve pressure over bony prominences  - Avoid friction and shearing  - Provide appropriate hygiene as needed including keeping skin clean and dry  - Evaluate need for skin moisturizer/barrier cream  - Collaborate with interdisciplinary team   - Patient/family teaching  - Consider wound care consult   Outcome: Progressing     Problem: CARDIOVASCULAR - ADULT  Goal: Maintains optimal cardiac output and hemodynamic stability  Description: INTERVENTIONS:  - Monitor I/O, vital signs and rhythm  - Monitor for S/S and trends of decreased cardiac output  - Administer and titrate ordered vasoactive medications to optimize hemodynamic stability  - Assess quality of pulses, skin color and temperature  - Assess for signs of decreased coronary artery perfusion  - Instruct patient to report change in severity of symptoms  Outcome: Progressing  Goal: Absence of cardiac dysrhythmias or at baseline rhythm  Description: INTERVENTIONS:  - Continuous cardiac monitoring, vital signs, obtain 12 lead EKG if ordered  - Administer antiarrhythmic and heart rate control medications as ordered  - Monitor electrolytes and administer replacement therapy as ordered  Outcome: Progressing     Problem: RESPIRATORY - ADULT  Goal: Achieves optimal ventilation and oxygenation  Description: INTERVENTIONS:  - Assess for changes in respiratory status  - Assess for changes in mentation and behavior  - Position to facilitate oxygenation and minimize respiratory effort  - Oxygen administered by appropriate delivery if ordered  - Initiate smoking cessation education as indicated  - Encourage broncho-pulmonary hygiene including cough, deep breathe, Incentive Spirometry  - Assess the need for suctioning and aspirate as needed  - Assess and instruct to report SOB or any respiratory difficulty  - Respiratory Therapy support as indicated  Outcome: Progressing     Problem: GENITOURINARY - ADULT  Goal: Maintains or returns to baseline urinary function  Description: INTERVENTIONS:  - Assess urinary function  - Encourage oral fluids to ensure adequate hydration if ordered  - Administer IV fluids as ordered to ensure adequate hydration  - Administer ordered medications as needed  - Offer frequent toileting  - Follow urinary retention protocol if ordered  Outcome: Progressing  Goal: Absence of urinary retention  Description: INTERVENTIONS:  - Assess patient’s ability to void and empty bladder  - Monitor I/O  - Bladder scan as needed  - Discuss with physician/AP medications to alleviate retention as needed  - Discuss catheterization for long term situations as appropriate  Outcome: Progressing  Goal: Urinary catheter remains patent  Description: INTERVENTIONS:  - Assess patency of urinary catheter  - If patient has a chronic hernandez, consider changing catheter if non-functioning  - Follow guidelines for intermittent irrigation of non-functioning urinary catheter  Outcome: Progressing     Problem: METABOLIC, FLUID AND ELECTROLYTES - ADULT  Goal: Electrolytes maintained within normal limits  Description: INTERVENTIONS:  - Monitor labs and assess patient for signs and symptoms of electrolyte imbalances  - Administer electrolyte replacement as ordered  - Monitor response to electrolyte replacements, including repeat lab results as appropriate  - Instruct patient on fluid and nutrition as appropriate  Outcome: Progressing  Goal: Fluid balance maintained  Description: INTERVENTIONS:  - Monitor labs   - Monitor I/O and WT  - Instruct patient on fluid and nutrition as appropriate  - Assess for signs & symptoms of volume excess or deficit  Outcome: Progressing  Goal: Glucose maintained within target range  Description: INTERVENTIONS:  - Monitor Blood Glucose as ordered  - Assess for signs and symptoms of hyperglycemia and hypoglycemia  - Administer ordered medications to maintain glucose within target range  - Assess nutritional intake and initiate nutrition service referral as needed  Outcome: Progressing     Problem: SKIN/TISSUE INTEGRITY - ADULT  Goal: Skin Integrity remains intact(Skin Breakdown Prevention)  Description: Assess:  -Perform Itz assessment every   -Clean and moisturize skin every   -Inspect skin when repositioning, toileting, and assisting with ADLS  -Assess under medical devices such as  every   -Assess extremities for adequate circulation and sensation     Bed Management:  -Have minimal linens on bed & keep smooth, unwrinkled  -Change linens as needed when moist or perspiring  -Avoid sitting or lying in one position for more than  hours while in bed  -Keep HOB at degrees     Toileting:  -Offer bedside commode  -Assess for incontinence every   -Use incontinent care products after each incontinent episode such as     Activity:  -Mobilize patient  times a day  -Encourage activity and walks on unit  -Encourage or provide ROM exercises   -Turn and reposition patient every  Hours  -Use appropriate equipment to lift or move patient in bed  -Instruct/ Assist with weight shifting every  when out of bed in chair  -Consider limitation of chair time  hour intervals    Skin Care:  -Avoid use of baby powder, tape, friction and shearing, hot water or constrictive clothing  -Relieve pressure over bony prominences using   -Do not massage red bony areas    Next Steps:  -Teach patient strategies to minimize risks such as    -Consider consults to  interdisciplinary teams such as   Outcome: Progressing  Goal: Incision(s), wounds(s) or drain site(s) healing without S/S of infection  Description: INTERVENTIONS  - Assess and document dressing, incision, wound bed, drain sites and surrounding tissue  - Provide patient and family education  - Perform skin care/dressing changes every   Outcome: Progressing  Goal: Pressure injury heals and does not worsen  Description: Interventions:  - Implement low air loss mattress or specialty surface (Criteria met)  - Apply silicone foam dressing  - Instruct/assist with weight shifting every minutes when in chair   - Limit chair time to  hour intervals  - Use special pressure reducing interventions such as  when in chair   - Apply fecal or urinary incontinence containment device   - Perform passive or active ROM every   - Turn and reposition patient & offload bony prominences every  hours   - Utilize friction reducing device or surface for transfers   - Consider consults to  interdisciplinary teams such as   - Use incontinent care products after each incontinent episode such as   - Consider nutrition services referral as needed  Outcome: Progressing     Problem: HEMATOLOGIC - ADULT  Goal: Maintains hematologic stability  Description: INTERVENTIONS  - Assess for signs and symptoms of bleeding or hemorrhage  - Monitor labs  - Administer supportive blood products/factors as ordered and appropriate  Outcome: Progressing     Problem: Nutrition/Hydration-ADULT  Goal: Nutrient/Hydration intake appropriate for improving, restoring or maintaining nutritional needs  Description: Monitor and assess patient's nutrition/hydration status for malnutrition  Collaborate with interdisciplinary team and initiate plan and interventions as ordered  Monitor patient's weight and dietary intake as ordered or per policy  Utilize nutrition screening tool and intervene as necessary  Determine patient's food preferences and provide high-protein, high-caloric foods as appropriate       INTERVENTIONS:  - Monitor oral intake, urinary output, labs, and treatment plans  - Assess nutrition and hydration status and recommend course of action  - Evaluate amount of meals eaten  - Assist patient with eating if necessary   - Allow adequate time for meals  - Recommend/ encourage appropriate diets, oral nutritional supplements, and vitamin/mineral supplements  - Order, calculate, and assess calorie counts as needed  - Recommend, monitor, and adjust tube feedings and TPN/PPN based on assessed needs  - Assess need for intravenous fluids  - Provide specific nutrition/hydration education as appropriate  - Include patient/family/caregiver in decisions related to nutrition  Outcome: Progressing     Problem: SAFETY,RESTRAINT: NV/NON-SELF DESTRUCTIVE BEHAVIOR  Goal: Remains free of harm/injury (restraint for non violent/non self-detsructive behavior)  Description: INTERVENTIONS:  - Instruct patient/family regarding restraint use   - Assess and monitor physiologic and psychological status   - Provide interventions and comfort measures to meet assessed patient needs   - Identify and implement measures to help patient regain control  - Assess readiness for release of restraint   Outcome: Progressing  Goal: Returns to optimal restraint-free functioning  Description: INTERVENTIONS:  - Assess the patient's behavior and symptoms that indicate continued need for restraint  - Identify and implement measures to help patient regain control  - Assess readiness for release of restraint   Outcome: Progressing

## 2023-04-28 ENCOUNTER — APPOINTMENT (INPATIENT)
Dept: NEUROLOGY | Facility: CLINIC | Age: 63
End: 2023-04-28

## 2023-04-28 LAB
ALBUMIN SERPL BCP-MCNC: 1.7 G/DL (ref 3.5–5)
ALP SERPL-CCNC: 169 U/L (ref 46–116)
ALT SERPL W P-5'-P-CCNC: 46 U/L (ref 12–78)
ANION GAP SERPL CALCULATED.3IONS-SCNC: 4 MMOL/L (ref 4–13)
ANION GAP SERPL CALCULATED.3IONS-SCNC: 5 MMOL/L (ref 4–13)
ANION GAP SERPL CALCULATED.3IONS-SCNC: 5 MMOL/L (ref 4–13)
AST SERPL W P-5'-P-CCNC: 89 U/L (ref 5–45)
BASE EX.OXY STD BLDV CALC-SCNC: 63 % (ref 60–80)
BASE EXCESS BLDV CALC-SCNC: -2.7 MMOL/L
BILIRUB DIRECT SERPL-MCNC: 1.06 MG/DL (ref 0–0.2)
BILIRUB SERPL-MCNC: 1.53 MG/DL (ref 0.2–1)
BUN SERPL-MCNC: 26 MG/DL (ref 5–25)
BUN SERPL-MCNC: 26 MG/DL (ref 5–25)
BUN SERPL-MCNC: 29 MG/DL (ref 5–25)
CA-I BLD-SCNC: 1.08 MMOL/L (ref 1.12–1.32)
CALCIUM SERPL-MCNC: 6.7 MG/DL (ref 8.3–10.1)
CALCIUM SERPL-MCNC: 7.2 MG/DL (ref 8.3–10.1)
CALCIUM SERPL-MCNC: 7.3 MG/DL (ref 8.3–10.1)
CHLORIDE SERPL-SCNC: 104 MMOL/L (ref 96–108)
CHLORIDE SERPL-SCNC: 108 MMOL/L (ref 96–108)
CHLORIDE SERPL-SCNC: 111 MMOL/L (ref 96–108)
CO2 SERPL-SCNC: 22 MMOL/L (ref 21–32)
CO2 SERPL-SCNC: 24 MMOL/L (ref 21–32)
CO2 SERPL-SCNC: 25 MMOL/L (ref 21–32)
CREAT SERPL-MCNC: 1.57 MG/DL (ref 0.6–1.3)
CREAT SERPL-MCNC: 1.75 MG/DL (ref 0.6–1.3)
CREAT SERPL-MCNC: 1.76 MG/DL (ref 0.6–1.3)
ERYTHROCYTE [DISTWIDTH] IN BLOOD BY AUTOMATED COUNT: 14.4 % (ref 11.6–15.1)
EST. AVERAGE GLUCOSE BLD GHB EST-MCNC: 131 MG/DL
GFR SERPL CREATININE-BSD FRML MDRD: 41 ML/MIN/1.73SQ M
GFR SERPL CREATININE-BSD FRML MDRD: 41 ML/MIN/1.73SQ M
GFR SERPL CREATININE-BSD FRML MDRD: 47 ML/MIN/1.73SQ M
GLUCOSE SERPL-MCNC: 131 MG/DL (ref 65–140)
GLUCOSE SERPL-MCNC: 139 MG/DL (ref 65–140)
GLUCOSE SERPL-MCNC: 141 MG/DL (ref 65–140)
GLUCOSE SERPL-MCNC: 145 MG/DL (ref 65–140)
GLUCOSE SERPL-MCNC: 172 MG/DL (ref 65–140)
GLUCOSE SERPL-MCNC: 176 MG/DL (ref 65–140)
GLUCOSE SERPL-MCNC: 179 MG/DL (ref 65–140)
HBA1C MFR BLD: 6.2 %
HCO3 BLDV-SCNC: 23.2 MMOL/L (ref 24–30)
HCT VFR BLD AUTO: 22 % (ref 36.5–49.3)
HGB BLD-MCNC: 7.4 G/DL (ref 12–17)
INR PPP: 1.32 (ref 0.84–1.19)
MAGNESIUM SERPL-MCNC: 1.9 MG/DL (ref 1.6–2.6)
MAGNESIUM SERPL-MCNC: 2.5 MG/DL (ref 1.6–2.6)
MAGNESIUM SERPL-MCNC: 2.9 MG/DL (ref 1.6–2.6)
MCH RBC QN AUTO: 33.8 PG (ref 26.8–34.3)
MCHC RBC AUTO-ENTMCNC: 33.6 G/DL (ref 31.4–37.4)
MCV RBC AUTO: 101 FL (ref 82–98)
NASAL CANNULA: 6
O2 CT BLDV-SCNC: 8.4 ML/DL
PCO2 BLDV: 45.4 MM HG (ref 42–50)
PH BLDV: 7.33 [PH] (ref 7.3–7.4)
PHOSPHATE SERPL-MCNC: 2.1 MG/DL (ref 2.3–4.1)
PHOSPHATE SERPL-MCNC: 3.2 MG/DL (ref 2.3–4.1)
PHOSPHATE SERPL-MCNC: 3.4 MG/DL (ref 2.3–4.1)
PLATELET # BLD AUTO: 98 THOUSANDS/UL (ref 149–390)
PMV BLD AUTO: 10 FL (ref 8.9–12.7)
PO2 BLDV: 35.7 MM HG (ref 35–45)
POTASSIUM SERPL-SCNC: 3.7 MMOL/L (ref 3.5–5.3)
POTASSIUM SERPL-SCNC: 3.9 MMOL/L (ref 3.5–5.3)
POTASSIUM SERPL-SCNC: 4.7 MMOL/L (ref 3.5–5.3)
PROCALCITONIN SERPL-MCNC: 1.9 NG/ML
PROT SERPL-MCNC: 4.8 G/DL (ref 6.4–8.4)
PROTHROMBIN TIME: 16.6 SECONDS (ref 11.6–14.5)
RBC # BLD AUTO: 2.19 MILLION/UL (ref 3.88–5.62)
SODIUM SERPL-SCNC: 134 MMOL/L (ref 135–147)
SODIUM SERPL-SCNC: 136 MMOL/L (ref 135–147)
SODIUM SERPL-SCNC: 138 MMOL/L (ref 135–147)
VANCOMYCIN SERPL-MCNC: 15.3 UG/ML (ref 10–20)
WBC # BLD AUTO: 15.14 THOUSAND/UL (ref 4.31–10.16)

## 2023-04-28 RX ORDER — MAGNESIUM SULFATE HEPTAHYDRATE 40 MG/ML
2 INJECTION, SOLUTION INTRAVENOUS ONCE
Status: COMPLETED | OUTPATIENT
Start: 2023-04-28 | End: 2023-04-28

## 2023-04-28 RX ORDER — PHENOBARBITAL SODIUM 65 MG/ML
130 INJECTION INTRAMUSCULAR ONCE
Status: COMPLETED | OUTPATIENT
Start: 2023-04-28 | End: 2023-04-28

## 2023-04-28 RX ORDER — CALCIUM GLUCONATE 20 MG/ML
2 INJECTION, SOLUTION INTRAVENOUS ONCE
Status: COMPLETED | OUTPATIENT
Start: 2023-04-28 | End: 2023-04-28

## 2023-04-28 RX ORDER — POTASSIUM CHLORIDE 20 MEQ/1
40 TABLET, EXTENDED RELEASE ORAL ONCE
Status: DISCONTINUED | OUTPATIENT
Start: 2023-04-28 | End: 2023-04-28

## 2023-04-28 RX ORDER — POTASSIUM CHLORIDE 20MEQ/15ML
40 LIQUID (ML) ORAL ONCE
Status: COMPLETED | OUTPATIENT
Start: 2023-04-28 | End: 2023-04-28

## 2023-04-28 RX ORDER — DEXMEDETOMIDINE HYDROCHLORIDE 4 UG/ML
.1-1.2 INJECTION, SOLUTION INTRAVENOUS
Status: DISCONTINUED | OUTPATIENT
Start: 2023-04-28 | End: 2023-04-29

## 2023-04-28 RX ADMIN — LEVALBUTEROL HYDROCHLORIDE 1.25 MG: 1.25 SOLUTION, CONCENTRATE RESPIRATORY (INHALATION) at 20:02

## 2023-04-28 RX ADMIN — POTASSIUM PHOSPHATE, MONOBASIC AND POTASSIUM PHOSPHATE, DIBASIC 30 MMOL: 224; 236 INJECTION, SOLUTION, CONCENTRATE INTRAVENOUS at 07:42

## 2023-04-28 RX ADMIN — MICONAZOLE NITRATE: 20 CREAM TOPICAL at 16:26

## 2023-04-28 RX ADMIN — MAGNESIUM SULFATE HEPTAHYDRATE 2 G: 40 INJECTION, SOLUTION INTRAVENOUS at 01:44

## 2023-04-28 RX ADMIN — CHLORHEXIDINE GLUCONATE 0.12% ORAL RINSE 15 ML: 1.2 LIQUID ORAL at 08:17

## 2023-04-28 RX ADMIN — CALCIUM GLUCONATE 2 G: 20 INJECTION, SOLUTION INTRAVENOUS at 01:44

## 2023-04-28 RX ADMIN — THIAMINE HYDROCHLORIDE 500 MG: 100 INJECTION, SOLUTION INTRAMUSCULAR; INTRAVENOUS at 09:18

## 2023-04-28 RX ADMIN — CEFEPIME 2000 MG: 2 INJECTION, POWDER, FOR SOLUTION INTRAVENOUS at 00:11

## 2023-04-28 RX ADMIN — B-COMPLEX W/ C & FOLIC ACID TAB 1 TABLET: TAB at 08:34

## 2023-04-28 RX ADMIN — INSULIN LISPRO 2 UNITS: 100 INJECTION, SOLUTION INTRAVENOUS; SUBCUTANEOUS at 00:05

## 2023-04-28 RX ADMIN — NOREPINEPHRINE BITARTRATE 5 MCG/MIN: 1 INJECTION, SOLUTION, CONCENTRATE INTRAVENOUS at 18:54

## 2023-04-28 RX ADMIN — DEXMEDETOMIDINE HYDROCHLORIDE 0.2 MCG/KG/HR: 400 INJECTION INTRAVENOUS at 12:54

## 2023-04-28 RX ADMIN — VANCOMYCIN HYDROCHLORIDE 750 MG: 750 INJECTION, SOLUTION INTRAVENOUS at 08:47

## 2023-04-28 RX ADMIN — PREDNISONE 40 MG: 20 TABLET ORAL at 08:19

## 2023-04-28 RX ADMIN — PANTOPRAZOLE SODIUM 40 MG: 40 INJECTION, POWDER, FOR SOLUTION INTRAVENOUS at 08:17

## 2023-04-28 RX ADMIN — THIAMINE HYDROCHLORIDE 500 MG: 100 INJECTION, SOLUTION INTRAMUSCULAR; INTRAVENOUS at 15:14

## 2023-04-28 RX ADMIN — METRONIDAZOLE 500 MG: 500 INJECTION, SOLUTION INTRAVENOUS at 00:11

## 2023-04-28 RX ADMIN — NICOTINE 21 MG: 21 PATCH, EXTENDED RELEASE TRANSDERMAL at 08:31

## 2023-04-28 RX ADMIN — CEFEPIME 2000 MG: 2 INJECTION, POWDER, FOR SOLUTION INTRAVENOUS at 11:53

## 2023-04-28 RX ADMIN — FOLIC ACID 1000 MCG: 1 TABLET ORAL at 08:19

## 2023-04-28 RX ADMIN — MICONAZOLE NITRATE: 20 CREAM TOPICAL at 08:32

## 2023-04-28 RX ADMIN — IPRATROPIUM BROMIDE 0.5 MG: 0.5 SOLUTION RESPIRATORY (INHALATION) at 07:59

## 2023-04-28 RX ADMIN — FENTANYL CITRATE 50 MCG: 50 INJECTION INTRAMUSCULAR; INTRAVENOUS at 02:57

## 2023-04-28 RX ADMIN — THIAMINE HYDROCHLORIDE 500 MG: 100 INJECTION, SOLUTION INTRAMUSCULAR; INTRAVENOUS at 21:08

## 2023-04-28 RX ADMIN — IPRATROPIUM BROMIDE 0.5 MG: 0.5 SOLUTION RESPIRATORY (INHALATION) at 13:05

## 2023-04-28 RX ADMIN — Medication 100 MCG/HR: at 03:54

## 2023-04-28 RX ADMIN — VASOPRESSIN 0.04 UNITS/MIN: 20 INJECTION INTRAVENOUS at 11:10

## 2023-04-28 RX ADMIN — VASOPRESSIN 0.04 UNITS/MIN: 20 INJECTION INTRAVENOUS at 00:58

## 2023-04-28 RX ADMIN — PHENOBARBITAL SODIUM 130 MG: 65 INJECTION INTRAMUSCULAR at 13:26

## 2023-04-28 RX ADMIN — LEVALBUTEROL HYDROCHLORIDE 1.25 MG: 1.25 SOLUTION, CONCENTRATE RESPIRATORY (INHALATION) at 07:59

## 2023-04-28 RX ADMIN — PANTOPRAZOLE SODIUM 40 MG: 40 INJECTION, POWDER, FOR SOLUTION INTRAVENOUS at 21:08

## 2023-04-28 RX ADMIN — INSULIN LISPRO 2 UNITS: 100 INJECTION, SOLUTION INTRAVENOUS; SUBCUTANEOUS at 11:39

## 2023-04-28 RX ADMIN — IPRATROPIUM BROMIDE 0.5 MG: 0.5 SOLUTION RESPIRATORY (INHALATION) at 20:02

## 2023-04-28 RX ADMIN — METRONIDAZOLE 500 MG: 500 INJECTION, SOLUTION INTRAVENOUS at 07:42

## 2023-04-28 RX ADMIN — LEVALBUTEROL HYDROCHLORIDE 1.25 MG: 1.25 SOLUTION, CONCENTRATE RESPIRATORY (INHALATION) at 13:05

## 2023-04-28 RX ADMIN — POTASSIUM CHLORIDE 40 MEQ: 1.5 SOLUTION ORAL at 01:32

## 2023-04-28 NOTE — CASE MANAGEMENT
Case Management Discharge Planning Note    Patient name Bertha Pugh  [id number]  : 1960 Date 2023       Current Admission Date: 2023  Current Admission Diagnosis:Shock [facility])   Patient Active Problem List    Diagnosis Date Noted   • Hypoxia    • Shock ([facility] ) 2023   • Acute respiratory failure with hypoxia ([facility] ) 2023   • End stage COPD ([facility] ) 2023   • Hyperglycemia 2023   • Abnormal liver function 2023   • Hyperammonemia ([facility] ) 2023   • Screening for diabetes mellitus 2019   • Status post reversal of ileostomy 10/25/2018   • Hypercalcemia 2018   • Hematemesis with nausea 2018   • Presence of drug-eluting stent in right coronary artery 2018   • Paroxysmal atrial fibrillation ([facility] ) 2018   • DEBBY (acute kidney injury) ([facility] ) 2018   • Hypotension 2018   • Macrocytic anemia 2018   • Hypocalcemia 2018   • Perforation of colon (HCC)    • Moderate protein-calorie malnutrition ([facility] ) 2018   • Heart failure, systolic, due to idiopathic cardiomyopathy ([facility] ) 2018   • Agitation 2018   • Lactic acidosis 05/15/2018   • High anion gap metabolic acidosis    • Hyponatremia 05/15/2018   • Alcohol abuse 05/15/2018   • Cigarette nicotine dependence without complication    • Stage 4 very severe COPD by GOLD classification ([place] [address] ) 2017   • Spinal stenosis of cervical region 2016   • Pericarditis 2016   • Coronary artery disease involving native coronary artery of native heart without angina pectoris 2016   • Crohn disease ([facility] ) 10/22/2016   • Atherosclerosis of coronary artery 2016   • Dyslipidemia 10/19/2013      LOS (days): 3  Geometric Mean LOS (GMLOS) (days):   Days to GMLOS:     OBJECTIVE:  Risk of Unplanned Readmission Score: 27 38         Current admission status: Inpatient   Preferred Pharmacy:   [id number] - [address]  [address]  [place]  Phone: [phone] Fax: [phone]    Primary Care Provider: Gale Griffiths DO    Primary Insurance: [facility]  Secondary Insurance:     DISCHARGE DETAILS:                                         Additional Comments: Received call from Mayur Finley at [address], she completed bedside visit yesterday, and requested current clinical information  Referral will be transferred to [facility] on [address], they will continue to follow patient here and in the community  CM should call [address] [place] [address] Aging when patient is discharged, with discharge location  CM will continue to follow for d/c needs and place referrals as appropriate

## 2023-04-28 NOTE — PROGRESS NOTES
Nutrition Recommendations:    Initiate oral diet as able post extubation - SLP evaluation as warranted       If unable to pass swallow eval and plan to continue enteral nutrition support, resume trickle feeds of Osmolite 1 0 and advance by 10mL/hr q4 hrs as tolerated to goal rate of 72mL/hr x24 hrs (would provide 1831 kcals, 76g protein, 1442mL free water)

## 2023-04-28 NOTE — OCCUPATIONAL THERAPY NOTE
Occupational Therapy Cancel Note       04/28/23 0925   OT Last Visit   OT Visit Date 04/28/23   Note Type   Note type Cancelled Session   Cancel Reasons Intubated/sedated         Maribel Charles, OT

## 2023-04-28 NOTE — NURSING NOTE
please see below message. Do you want her to use Efudex again or change to something else.    Called by CANDICE Gutierrez for pt calling out for a "breathing tx "  Pt found to be without his oxygen on and breathing tachypnic, labored with severe accessory muscle use  Pt placed back on 6 L NC and spo2 80-83%  Pt placed on 15 L NRB and spo2 increased to 98% and accessory muscle use decreased  Pt placed back on 6 L NC and spo2 maintained at 93%  SOD resident made aware  Will order continuous pulse ox as an alternative to restraining pt at this time  Alternative measure of continuous pulse ox explained to pt who verbalized understanding, however unsure if pt understood  Reiterated again importance of keeping his oxygen on and pt again replied "I don't take it off  It just falls off "  Will continue to monitor pt closely

## 2023-04-28 NOTE — RESPIRATORY THERAPY NOTE
04/28/23 0801   Respiratory Assessment   Assessment Type Assess only   General Appearance Awake   Respiratory Pattern Assisted   Chest Assessment Chest expansion symmetrical   Bilateral Breath Sounds Expiratory wheezes; Diminished   Suction ET Tube   Resp Comments Pt generally synchronous in cmv settings  E wheeze  sxn for sm frothy white  Pt initiated on PSV which was well tolerated with a decrease in inspiratory resistance and i:e to 1:4 3  occasional low RR alarm  Plan to maintain at this time     O2 Device g5   Vent Information   Vent ID [id number]   Vent type Whitley G5   Whitley C3/G5 Vent Mode (S)CMV   $ Pulse Oximetry Spot Check Charge Completed   (S)CMV Settings   Resp Rate (BPM) 20 BPM   VT (mL) 450 mL   FIO2 (%) 40 %   PEEP (cmH2O) 8 cmH2O   I:E Ratio 1:2 0   Insp Time (%) 1 %   Flow Trigger (LPM) -5   Humidification Heater   Heater Temperature (Set) 95 °F (35 °C)   (S)CMV Actuals   Resp Rate (BPM) 20 BPM   VT (mL) 440   MV 8 8   MAP (cmH2O) 14 cmH2O   Peak Pressure (cmH2O) 30 cmH2O   I:E Ratio (Obs) 1:2 0   Insp Resistance 23   Heater Temperature (Obs) 94 6 °F (34 8 °C)   Static Compliance (mL/cmH20) 69 mL/cmH2O   Plateau Pressure (cm H2O) 18 cm H2O   (S)CMV Alarms   High Peak Pressure (cmH2O) 45   Low Pressure (cmH2O) 5 cm H2O   High Resp Rate (BPM) 40 BPM   Low Resp Rate (BPM) 8 BPM   High MV (L/min) 20 L/min   Low MV (L/min) 4 L/min   High VT (mL) 1000 mL   Low VT (mL) 250 mL   Apnea Time (s) 20 S   Maintenance   Alarm (pink) cable attached No   Resuscitation bag with peep valve at bedside Yes   Water bag changed No   Circuit changed No   Daily Screen   Patient safety screen outcome: Passed   IHI Ventilator Associated Pneumonia Bundle   Daily Assessment of Readiness to Extubate Yes   Head of Bed Elevated HOB 30   ETT  Oral   Placement Date/Time: 04/26/23 1850   Type: Oral  Secured at (cm): 24   Secured at (cm) 24   [facility]   Repositioned Left to 100 [address] [place] [address] [place] by [employer] tube rowan   Site Condition Dry   Cuff Pressure (cm H2O)   (green zone)   HI-LO Suction  Intermittent suction   HI-LO Secretions Small;Clear   HI-LO Intervention Patent

## 2023-04-28 NOTE — PROGRESS NOTES
Daily Progress Note - Critical Care   Wilbur Medina 63 y o  male MRN: [id number]  Unit/Bed#: MICU 05 Encounter: [id number]        ----------------------------------------------------------------------------------------  HPI    63year old male PMH of alcohol abuse with prior DTs, end-stage COPD, Crohn's disease s/p ileostomy and reversal in 2018, CAD with MI in 2012 with stent in RCA presented to [facility] ED on 4/25 with complaint of shortness of breath for several days  He was hypotensive, tachycardic, and tachypneic with an elevated lactate of 20  Patient required pressor support and BiPAP, subsequently transferred to ICU for further management  24hr events:   Run of 14 beats VTach and SVT that self-resolved  Fentanyl gtt increased to 100 mcg/hr due to increased agitation  Started on trickle tube feeds yesterday      ---------------------------------------------------------------------------------------  SUBJECTIVE  Patient intubated and sedated  Review of Systems  Review of systems was reviewed and negative unless stated above in HPI/24-hour events   ---------------------------------------------------------------------------------------  Assessment and Plan:    Neuro:   • Diagnosis: Alcohol abuse   o Ethanol 4/25: 45  o Ethylene glycol and methanol 4/25: negative   o UDS 4/25: negative   o Last drink 4/25 prior to EMS arrival  o Phenobarbital  - 4/25: 158 mg  - 4/26: 230/650/130/130   - 4/27: 130/260   o vEEG 4/27: pending   o Plan:   - [facility] protocol  - Thiamine, folate   - Fomepizole started 4/25, discontinued 4/26   - Monitor for alcohol withdrawal   - Toxicology consulted - total phenobarbital dosing recommended max of 2 to 2 5g  If reaching 2g and need additional treatment, adjunctive measures could include precedex and ketamine, as needed benzodiazepines  • Diagnosis: Delirium ppx   o Plan:   - CAM ICU  Sleep-wake regulation         CV:   • Diagnosis: Undifferentiated shock  o Suspect septic shock o Received 30 cc/kg IVF bolus  o TTE 4/93/3132: systolic function mildly reduced to low normal  EF 52%  o Plan:   - Wean levophed and vaso, maintain MAP >65  - Continue cefepime/flagyl/vanc     • Diagnosis: CAD with MI in 2012 s/p RCA stent   o Home atorvastatin 40mg daily   o Plan:   - Continue home statin       Pulm:  • Diagnosis: Acute hypoxic respiratory failure   o Pulmonary edema vs worsening COPD   o BNP 3297 (330 in 2016)   o CT CAP 4/25: moderate centrilobar emphysema  A 2 mm right middle lobe pulmonary nodule (4/147) and a 3 mm right lower lobe solid pulm nodule (4/193), stable since 5/15/2018, therefore consistent with a benign etiology  No new or enlarging pulmonary nodules  o Intubated 4/26 for airway protection and worsening hypercapnia   o Sputum Cx 4/27: pending   o Procalcitonin 4/28: pending   o Plan:   - Continue mechanical ventilation  Daily SAT/SBT   - Follow-up sputum cx and procalcitonin   • Diagnosis: End-stage COPD   o Home albuterol and ipratropium   o Plan:   - Continue atrovent/xopenex TID   - Solumedrol 40mg daily      GI:   • Diagnosis: Abnormal liver function   o , ALT 66, Alk phos 263, T bili 1 10  o CT CAP 4/25: hepatomegaly and severe hepatic steatosis   o Chronic hepatitis panel 4/25: negative   o RUQ U/S 4/27: hepatomegaly with severe hepatic steatosis  Visualized portions of pancreas are normal  Minimal cholelithiasis  o Plan:   - Trend LFTs, INR    • Diagnosis: Hyperammonemia (resolved)   o Ammonia POA 82, most recent 32 this AM   o Plan:   - Discontinue lactulose  • Diagnosis: Crohn's disease   ? H/o transverse colon perforation s/p ileostomy and reversal in 2018  ? Currently does not take any medications for Crohns   ? CT CAP 4/25: no bowel obstruction  Underdistended descending colon  Postsurgical changes of partial right hemicolectomy  • Diagnosis: Bloody NGT output  ?  Patient evaluated by acute care surgery in the ED, NGT was inserted due to concern for SBO vs ischemic bowel, ~100 cc of bloody output seen in cannula  ? CT CAP 4/25: circumferential mural thickening of esophagus with an air-fluid level, may be related to esophagitis and esophageal dysmotility  o Plan:   - Monitor NGT output and trend H&H   • Diagnosis: Elevated lipase and amylase   o Possible chronic alcoholic pancreatitis   o Lipase 4/25: 2090 --> 595 on 4/26  Amylase 4/25: 155  o RUQ U/S 4/27: visualized portions of the pancreas are normal        :   • Diagnosis: DEBBY   o Unclear etiology at this time   o Cr 1 75, down from 2 87 on presentation   Baseline 0 9-1 2   o Plan:   - Hernandez in place, strict I/Os   - Trend BUN/Cr   • Diagnosis: Lactic acidosis (improving)   o Sepsis vs other shock vs albuterol   o Lactic acid POA 20, down to 2 6 yesterday 4/26  o Plan:   - Trend lactate   • Diagnosis: High anion gap metabolic acidosis (resolved)   o Secondary to lactic acidosis   o VBG 4/25: pH 7 07, pCO2 39 5, HCO3 11 2   o ABG 4/25: pH 7 286, pCO2 38 9, HCO3 18 1    o AG was 28 4/25, 7 this AM      F/E/N:   • Diagnosis: Hyponatremia (resolved)  o Possible beer potomania   o Urine osm 487, urine Na <5, serum osm 281   o Plan:   - BMP q8h   • Fluids: no mIVF   • Replete electrolytes as needed   • Nutrition: Trickle tube feeds Osmolite started 4/27      Heme/Onc:   • Diagnosis: Macrocytic anemia   o Low folate 2 3   o Vitamin B12 wnl (682)  o Plan:   - Folate 1000 mcg daily   - Trend H&H and monitor for s/s bleeding   • Diagnosis: DVT ppx   o Plan: SCDs       Endo:   • Diagnosis: Hyperglycemia   o A1c 4/25: 8  o Plan:  - SSI       ID:   • Diagnosis: Septic shock   o No clear source at this time   o U/A microscopic 4/25: small leukocytes, negative nitrite, no bacteria, 30-50 WBC    o Urine cx 4/25: mixed contaminants   o Blood cx 4/25: preliminary result demonstrates no growth at 48 hrs    o MRSA 4/25: positive   o Plan:   - Continue cefepime/flagyl/vanc, de-escalate once final blood cx results MSK/Skin:   • Diagnosis: DTI ppx   o Plan: Frequent repositioning  PT/OT and OOB as tolerated       Disposition: Continue Critical Care   Code Status: Level 1 - Full Code  ---------------------------------------------------------------------------------------  ICU CORE MEASURES    Prophylaxis   VTE Pharmacologic Prophylaxis: N/A  VTE Mechanical Prophylaxis: sequential compression device  Stress Ulcer Prophylaxis: Prophylaxis Not Indicated     ABCDE Protocol (if indicated)  Plan to perform spontaneous awakening trial today? Yes  Plan to perform spontaneous breathing trial today? Yes  Obvious barriers to extubation? Not applicable  CAM-ICU: Negative    Invasive Devices Review  Invasive Devices     Central Venous Catheter Line  Duration           CVC Central Lines 23 Triple 16cm 2 days          Peripheral Intravenous Line  Duration           Peripheral IV 23 Left Antecubital 3 days    Peripheral IV 23 Right;Ventral (anterior) Forearm <1 day          Drain  Duration           Urethral Catheter Temperature probe 2 days    NG/OG/Enteral Tube 16 Fr Right nare 1 day          Airway  Duration           ETT  Oral 1 day              Can any invasive devices be discontinued today?  No  ---------------------------------------------------------------------------------------  OBJECTIVE    Vitals   Vitals:    23 0400 23 0500 23 0543 23 0600   BP: 91/58 (!) 89/60  97/63   BP Location: Left arm      Pulse: 76 88  86   Resp: 20 19  20   Temp: 99 3 °F (37 4 °C) 99 3 °F (37 4 °C)  99 3 °F (37 4 °C)   TempSrc: Bladder      SpO2: 98% 97%  97%   Weight:   71 8 kg (158 lb 4 6 oz)    Height:         Temp (24hrs), Av 5 °F (37 5 °C), Min:99 °F (37 2 °C), Max:100 °F (37 8 °C)  Current: Temperature: 99 3 °F (37 4 °C)    Respiratory:  SpO2: SpO2: 97 %    Invasive/non-invasive ventilation settings   Respiratory    Lab Data (Last 4 hours)    None         O2/Vent Data (Last 4 hours)    None Physical Exam  Vitals and nursing note reviewed  Constitutional:       General: He is not in acute distress  HENT:      Head: Normocephalic and atraumatic  Nose:      Comments: NGT in place      Mouth/Throat:      Comments: ETT in place   Cardiovascular:      Rate and Rhythm: Normal rate and regular rhythm  Pulses: Normal pulses  Pulmonary:      Comments: On mechanical ventilation   Abdominal:      Palpations: Abdomen is soft  Hernia: A hernia is present  Genitourinary:     Comments: Hernandez in place   Skin:     General: Skin is warm and dry  Capillary Refill: Capillary refill takes less than 2 seconds  Neurological:      Mental Status: He is alert  Comments: Sedated but opens eyes to voice                Laboratory and Diagnostics:  Results from last 7 days   Lab Units 04/28/23  0443 04/27/23  1615 04/27/23  0617 04/26/23  1355 04/26/23  0429 04/25/23  1346 04/25/23  1045   WBC Thousand/uL 15 14*  --  8 81  --  10 52*  --  20 57*   HEMOGLOBIN g/dL 7 4* 7 3* 7 2* 8 0* 8 2* 11 4* 12 1   HEMATOCRIT % 22 0* 21 8* 21 3* 23 4* 24 3* 33 7* 37 7   PLATELETS Thousands/uL 98*  --  83*  --  74*  --  155   NEUTROS PCT %  --   --  91*  --  86*  --  85*   MONOS PCT %  --   --  3*  --  7  --  7     Results from last 7 days   Lab Units 04/28/23  0447 04/28/23  0443 04/28/23  0007 04/27/23  1615 04/27/23  1135 04/27/23  0452 04/27/23  0450 04/27/23  0221 04/26/23  1355 04/26/23  0850 04/25/23  1801 04/25/23  1704 04/25/23  1045   SODIUM mmol/L 136  --  134* 132* 132* 130* 130* 128*   < > 126*   < > 133* 125*   POTASSIUM mmol/L 3 9  --  3 7 3 7 3 9 3 8 3 8 3 8   < > 4 1   < > 3 8 4 7   CHLORIDE mmol/L 108  --  104 104 103 99 99 97   < > 95*   < > 106 86*   CO2 mmol/L 24  --  25 24 24 21 21 24   < > 23   < > 11* 11*   ANION GAP mmol/L 4  --  5 4 5 10 10 7   < > 8   < > 16* 28*   BUN mg/dL 26*  --  29* 29* 30* 31* 33* 32*   < > 33*   < > 27* 33*   CREATININE mg/dL 1 75*  --  1 76* 2 01* 1 97* 2 08* 2 09* 1 99*   < > 2 44*   < > 2 03* 2 87*   CALCIUM mg/dL 7 2*  --  6 7* 6 8* 7 0* 7 7* 7 7* 6 9*   < > 7 5*   < > 5 4* 8 5   GLUCOSE RANDOM mg/dL 139  --  172* 260* 229* 205* 203* 173*   < > 149*   < > 118 226*   ALT U/L  --  46  --   --   --   --  53  --   --  54  --  47 66   AST U/L  --  89*  --   --   --   --  134*  --   --  140*  --  147* 204*   ALK PHOS U/L  --  169*  --   --   --   --  158*  --   --  168*  --  168* 263*   ALBUMIN g/dL  --  1 7*  --   --   --   --  1 7*  --   --  1 9*  --  1 5* 2 1*   TOTAL BILIRUBIN mg/dL  --  1 53*  --   --   --   --  1 19*  --   --  1 38*  --  1 28* 1 10*    < > = values in this interval not displayed  Results from last 7 days   Lab Units 04/28/23  0514 04/28/23  0007 04/27/23  1615 04/27/23  1135 04/27/23  0450 04/27/23  0221 04/26/23  1228 04/26/23  0429   MAGNESIUM mg/dL 2 9* 1 9 2 2  --  2 5 2 3 2 3 1 1*   PHOSPHORUS mg/dL 2 1* 3 4 1 8* 2 3 1 9* 1 4*  --  2 8      Results from last 7 days   Lab Units 04/28/23  0443 04/27/23  0557 04/26/23  0429 04/25/23  1045   INR  1 32* 1 35* 1 30* 1 21*   PTT seconds  --   --   --  28          Results from last 7 days   Lab Units 04/26/23  0850 04/26/23  0543 04/26/23  0327 04/26/23  0010 04/25/23  1704 04/25/23  1627 04/25/23  1430   LACTIC ACID mmol/L 2 6* 4 1* 5 9* 6 6* 10 2* 11 7* 12 9*     ABG:  Results from last 7 days   Lab Units 04/26/23  1856 04/25/23  1802   PH ART  7 326* 7 286*   PCO2 ART mm Hg 50 7* 38 9   PO2 ART mm Hg 163 2* 296 1*   HCO3 ART mmol/L 25 9 18 1*   BASE EXC ART mmol/L -0 3 -7 9   ABG SOURCE   --  Line, Arterial     VBG:  Results from last 7 days   Lab Units 04/27/23  1138 04/26/23  1701 04/25/23  1802   PH SANAZ  7 328   < >  --    PCO2 SANAZ mm Hg 44 6   < >  --    PO2 SANAZ mm Hg 34 0*   < >  --    HCO3 SANAZ mmol/L 22 9*   < >  --    BASE EXC SANAZ mmol/L -2 9   < >  --    ABG SOURCE   --   --  Line, Arterial    < > = values in this interval not displayed       Results from last 7 days   Lab Units "04/28/23  0443 04/25/23  1045   PROCALCITONIN ng/ml 1 90* 1 73*       Micro  Results from last 7 days   Lab Units 04/26/23  1228 04/25/23  1713 04/25/23  1607 04/25/23  1112 04/25/23  1045   BLOOD CULTURE   --   --   --  No Growth at 48 hrs  No Growth at 48 hrs  URINE CULTURE   --   --  80,000-89,000 cfu/ml  --   --    MRSA CULTURE ONLY   --  Methicillin Resistant Staphylococcus aureus isolated*  --   --   --    C DIFF TOXIN B BY PCR  Negative  --   --   --   --        Imaging: I have personally reviewed pertinent reports  Intake and Output  I/O       04/24 0701 04/25 0700 04/25 0701 04/26 0700    I V  (mL/kg)  1837 8 (23 9)    NG/GT  0    IV Piggyback  2950    Total Intake(mL/kg)  4787 8 (62 2)    Urine (mL/kg/hr)  310    Emesis/NG output  2000    Stool  600    Total Output  2910    Net  +1877 8                Height and Weights   Height: 5' 6\" (167 6 cm)  IBW (Ideal Body Weight): 63 8 kg  Body mass index is 25 55 kg/m²  Weight (last 2 days)     Date/Time Weight    04/28/23 0543 71 8 (158 29)    04/27/23 0530 79 8 (175 93)    04/26/23 0714 76 7 (169)    04/26/23 0551 77 (169 75)            Nutrition       Diet Orders   (From admission, onward)             Start     Ordered    04/27/23 1532  Diet Enteral/Parenteral; Tube Feeding No Oral Diet; Osmolite 1 0; Continuous; 10  Diet effective now        References:    Nutrtion Support Algorithm Enteral vs  Parenteral   Question Answer Comment   Diet Type Enteral/Parenteral    Enteral/Parenteral Tube Feeding No Oral Diet    Tube Feeding Formula: Osmolite 1 0    Bolus/Cyclic/Continuous Continuous    Tube Feeding Goal Rate (mL/hr): 10    RD to adjust diet per protocol?  Yes        04/27/23 1532                  Active Medications  Scheduled Meds:  Current Facility-Administered Medications   Medication Dose Route Frequency Provider Last Rate   • albuterol  2 5 mg Nebulization Q4H PRN Maggie Charles MD     • cefepime  2,000 mg Intravenous Q12H Deisi Millard,  Stopped " (04/28/23 0059)   • chlorhexidine  15 mL Mouth/Throat Q12H [address] Suki Love MD     • fentaNYL  100 mcg/hr Intravenous Continuous [place],  mcg/hr (04/28/23 0354)   • fentanyl citrate (PF)  50 mcg Intravenous Q1H PRN Vicenta Simental MD     • folic acid  3,274 mcg Oral Daily Suki Love MD     • insulin lispro  2-12 Units Subcutaneous Q6H [address] Zion Herrera DO     • ipratropium  0 5 mg Nebulization TID Marlene Hopper MD     • levalbuterol  1 25 mg Nebulization TID Marlene Hopper MD     • metroNIDAZOLE  500 mg Intravenous Q8H Deisi Millard DO Stopped (04/28/23 0059)   • midazolam  2 mg/hr Intravenous Continuous Suki Love MD 2 mg/hr (04/27/23 1332)   • ANSHU ANTIFUNGAL   Topical BID AMALIA Garcia     • multivitamin stress formula  1 tablet Oral Daily Suki Love MD     • nicotine  21 mg Transdermal Daily Jewell Singh MD     • norepinephrine  1-30 mcg/min Intravenous Titrated Angelo Gibbons MD 4 mcg/min (04/27/23 2053)   • pantoprazole  40 mg Intravenous Q12H [address] Ryan Hua MD     • potassium phosphate  30 mmol Intravenous Once Suki Love MD     • predniSONE  40 mg Oral Daily Ryan Hua MD     • thiamine  500 mg Intravenous TID Ryan Hua MD Stopped (04/27/23 2200)   • [START ON 4/29/2023] thiamine  100 mg Oral Daily Ryan Hua MD     • vancomycin  15 mg/kg Intravenous Daily PRN Ryan Hua MD     • vasopressin  0 04 Units/min Intravenous Continuous Vicenta Simental MD 0 04 Units/min (04/28/23 0058)     Continuous Infusions:  fentaNYL, 100 mcg/hr, Last Rate: 100 mcg/hr (04/28/23 0354)  midazolam, 2 mg/hr, Last Rate: 2 mg/hr (04/27/23 1332)  norepinephrine, 1-30 mcg/min, Last Rate: 4 mcg/min (04/27/23 2053)  vasopressin, 0 04 Units/min, Last Rate: 0 04 Units/min (04/28/23 0058)      PRN Meds:   albuterol, 2 5 mg, Q4H PRN  fentanyl citrate (PF), 50 mcg, Q1H PRN  vancomycin, 15 mg/kg, Daily PRN        Allergies   Allergies   Allergen Reactions   • Medical Tape "  • Other      Brown cloth band aids      • Latex Rash     ---------------------------------------------------------------------------------------  Advance Directive and Living Will:      Power of :    POLST:    ---------------------------------------------------------------------------------------    Sharon Brar MD      Portions of the record may have been created with voice recognition software  Occasional wrong word or \"sound a like\" substitutions may have occurred due to the inherent limitations of voice recognition software    Read the chart carefully and recognize, using context, where substitutions have occurred  "

## 2023-04-28 NOTE — PROGRESS NOTES
Vancomycin IV Pharmacy-to-Dose Consultation    Bertha Pugh is a 63 y o  male who is currently receiving Vancomycin IV with management by the Pharmacy Consult service  Relevant clinical data and objective / subjective history reviewed  Vancomycin Assessment:  Indication: concern for septic shock  Status: critically ill, leukocytosis, tmax 100  Micro:   4/27 Sputum culture: in process  4/25 MRSA culture: positive  4/25 Urine culture: mixed contaminants x 4  4/25 Blood cultures x 2: no growth at 48 hours  Procalcitonin: 1 9 (from 1 73)  Renal Function: DEBBY improved, sCr 1 75 (peaked at 2 87, baseline 0 9-1 2); UOP 1 4 mL/kg/hr  Days of Therapy: 4  Current Dose: 1000 mg IV daily PRN when random level is less than 15 (last dose prior to level: 4/26 at 1258)  Goal AUC(24h)/Trough: 400-600, trough >10  Last Level: 15 3 (4/28 at 0443) 39 8 hours after last dose  Model Fit: Good    Vancomycin Plan:  New Dosing: change to 750 mg IV q24h (next dose: 4/28 at 0800)  Estimated AUC: 439 mcg*hr/mL  Estimated Trough: 13 5 mcg/mL  Next Level: Random 5/1 at 0600  Renal Function Monitoring: Daily BMP and [place] [address] will continue to follow closely for s/sx of nephrotoxicity, infusion reactions and appropriateness of therapy  BMP and CBC will be ordered per protocol  We will continue to follow the patient’s culture results and clinical progress daily       Evens Mazariegos, PharmD, [address] Clinical Pharmacist  [phone]

## 2023-04-28 NOTE — RESPIRATORY THERAPY NOTE
RT Ventilator Management Note  Alejandro Ungre 63 y o  male MRN: [id number]  Unit/Bed#: MICU 05 Encounter: [id number]      Daily Screen         4/27/2023  0731 4/27/2023  1548          Patient safety screen outcome[de-identified] Failed Failed      Not Ready for Weaning due to[de-identified] Underline problem not resolved Underline problem not resolved                Physical Exam:   Assessment Type: (P) Assess only  General Appearance: (P) Sedated  Respiratory Pattern: (P) Assisted  Chest Assessment: (P) Chest expansion symmetrical  Bilateral Breath Sounds: (P) Diminished  O2 Device: (P) vent      Resp Comments: (P) No vent changes made overnight  PT is stable on current CMV settings  WIll cont to monitor pt per protocol

## 2023-04-28 NOTE — PLAN OF CARE
Problem: MOBILITY - ADULT  Goal: Maintain or return to baseline ADL function  Description: INTERVENTIONS:  -  Assess patient's ability to carry out ADLs; assess patient's baseline for ADL function and identify physical deficits which impact ability to perform ADLs (bathing, care of mouth/teeth, toileting, grooming, dressing, etc )  - Assess/evaluate cause of self-care deficits   - Assess range of motion  - Assess patient's mobility; develop plan if impaired  - Assess patient's need for assistive devices and provide as appropriate  - Encourage maximum independence but intervene and supervise when necessary  - Involve family in performance of ADLs  - Assess for home care needs following discharge   - Consider OT consult to assist with ADL evaluation and planning for discharge  - Provide patient education as appropriate  Outcome: Progressing  Goal: Maintains/Returns to pre admission functional level  Description: INTERVENTIONS:  - Perform BMAT or MOVE assessment daily    - Set and communicate daily mobility goal to care team and patient/family/caregiver  - Collaborate with rehabilitation services on mobility goals if consulted  - Perform Range of Motion 5 times a day  - Reposition patient every 2 hours    - Dangle patient  times a day  - Stand patient  times a day  - Ambulate patient  times a day  - Out of bed to chair  times a day   - Out of bed for meals  times a day  - Out of bed for toileting  - Record patient progress and toleration of activity level   Outcome: Progressing     Problem: Prexisting or High Potential for Compromised Skin Integrity  Goal: Skin integrity is maintained or improved  Description: INTERVENTIONS:  - Identify patients at risk for skin breakdown  - Assess and monitor skin integrity  - Assess and monitor nutrition and hydration status  - Monitor labs   - Assess for incontinence   - Turn and reposition patient  - Assist with mobility/ambulation  - Relieve pressure over bony prominences  - Avoid friction and shearing  - Provide appropriate hygiene as needed including keeping skin clean and dry  - Evaluate need for skin moisturizer/barrier cream  - Collaborate with interdisciplinary team   - Patient/family teaching  - Consider wound care consult   Outcome: Progressing     Problem: CARDIOVASCULAR - ADULT  Goal: Maintains optimal cardiac output and hemodynamic stability  Description: INTERVENTIONS:  - Monitor I/O, vital signs and rhythm  - Monitor for S/S and trends of decreased cardiac output  - Administer and titrate ordered vasoactive medications to optimize hemodynamic stability  - Assess quality of pulses, skin color and temperature  - Assess for signs of decreased coronary artery perfusion  - Instruct patient to report change in severity of symptoms  Outcome: Progressing  Goal: Absence of cardiac dysrhythmias or at baseline rhythm  Description: INTERVENTIONS:  - Continuous cardiac monitoring, vital signs, obtain 12 lead EKG if ordered  - Administer antiarrhythmic and heart rate control medications as ordered  - Monitor electrolytes and administer replacement therapy as ordered  Outcome: Progressing     Problem: RESPIRATORY - ADULT  Goal: Achieves optimal ventilation and oxygenation  Description: INTERVENTIONS:  - Assess for changes in respiratory status  - Assess for changes in mentation and behavior  - Position to facilitate oxygenation and minimize respiratory effort  - Oxygen administered by appropriate delivery if ordered  - Initiate smoking cessation education as indicated  - Encourage broncho-pulmonary hygiene including cough, deep breathe, Incentive Spirometry  - Assess the need for suctioning and aspirate as needed  - Assess and instruct to report SOB or any respiratory difficulty  - Respiratory Therapy support as indicated  Outcome: Progressing     Problem: GENITOURINARY - ADULT  Goal: Maintains or returns to baseline urinary function  Description: INTERVENTIONS:  - Assess urinary function  - Encourage oral fluids to ensure adequate hydration if ordered  - Administer IV fluids as ordered to ensure adequate hydration  - Administer ordered medications as needed  - Offer frequent toileting  - Follow urinary retention protocol if ordered  Outcome: Progressing  Goal: Absence of urinary retention  Description: INTERVENTIONS:  - Assess patient’s ability to void and empty bladder  - Monitor I/O  - Bladder scan as needed  - Discuss with physician/AP medications to alleviate retention as needed  - Discuss catheterization for long term situations as appropriate  Outcome: Progressing  Goal: Urinary catheter remains patent  Description: INTERVENTIONS:  - Assess patency of urinary catheter  - If patient has a chronic hernandez, consider changing catheter if non-functioning  - Follow guidelines for intermittent irrigation of non-functioning urinary catheter  Outcome: Progressing     Problem: METABOLIC, FLUID AND ELECTROLYTES - ADULT  Goal: Electrolytes maintained within normal limits  Description: INTERVENTIONS:  - Monitor labs and assess patient for signs and symptoms of electrolyte imbalances  - Administer electrolyte replacement as ordered  - Monitor response to electrolyte replacements, including repeat lab results as appropriate  - Instruct patient on fluid and nutrition as appropriate  Outcome: Progressing  Goal: Fluid balance maintained  Description: INTERVENTIONS:  - Monitor labs   - Monitor I/O and WT  - Instruct patient on fluid and nutrition as appropriate  - Assess for signs & symptoms of volume excess or deficit  Outcome: Progressing  Goal: Glucose maintained within target range  Description: INTERVENTIONS:  - Monitor Blood Glucose as ordered  - Assess for signs and symptoms of hyperglycemia and hypoglycemia  - Administer ordered medications to maintain glucose within target range  - Assess nutritional intake and initiate nutrition service referral as needed  Outcome: Progressing     Problem: SKIN/TISSUE INTEGRITY - ADULT  Goal: Skin Integrity remains intact(Skin Breakdown Prevention)  Description: Assess:  -Perform Itz assessment every shift  -Clean and moisturize skin every 2  -Inspect skin when repositioning, toileting, and assisting with ADLS  -Assess under medical devices such as  every   -Assess extremities for adequate circulation and sensation     Bed Management:  -Have minimal linens on bed & keep smooth, unwrinkled  -Change linens as needed when moist or perspiring  -Avoid sitting or lying in one position for more than 2 hours while in bed  -Keep HOB at 30 degrees     Toileting:  -Offer bedside commode  -Assess for incontinence every   -Use incontinent care products after each incontinent episode such as     Activity:  -Mobilize patient  times a day  -Encourage activity and walks on unit  -Encourage or provide ROM exercises   -Turn and reposition patient every  Hours  -Use appropriate equipment to lift or move patient in bed  -Instruct/ Assist with weight shifting every  when out of bed in chair  -Consider limitation of chair time  hour intervals    Skin Care:  -Avoid use of baby powder, tape, friction and shearing, hot water or constrictive clothing  -Relieve pressure over bony prominences using   -Do not massage red bony areas    Next Steps:  -Teach patient strategies to minimize risks such as    -Consider consults to  interdisciplinary teams such as  Outcome: Progressing  Goal: Incision(s), wounds(s) or drain site(s) healing without S/S of infection  Description: INTERVENTIONS  - Assess and document dressing, incision, wound bed, drain sites and surrounding tissue  - Provide patient and family education  - Perform skin care/dressing changes every   Outcome: Progressing  Goal: Pressure injury heals and does not worsen  Description: Interventions:  - Implement low air loss mattress or specialty surface (Criteria met)  - Apply silicone foam dressing  - Instruct/assist with weight shifting every  minutes when in chair   - Limit chair time to  hour intervals  - Use special pressure reducing interventions such as  when in chair   - Apply fecal or urinary incontinence containment device   - Perform passive or active ROM every   - Turn and reposition patient & offload bony prominences every hours   - Utilize friction reducing device or surface for transfers   - Consider consults to  interdisciplinary teams such as   - Use incontinent care products after each incontinent episode such as  - Consider nutrition services referral as needed  Outcome: Progressing     Problem: HEMATOLOGIC - ADULT  Goal: Maintains hematologic stability  Description: INTERVENTIONS  - Assess for signs and symptoms of bleeding or hemorrhage  - Monitor labs  - Administer supportive blood products/factors as ordered and appropriate  Outcome: Progressing     Problem: Nutrition/Hydration-ADULT  Goal: Nutrient/Hydration intake appropriate for improving, restoring or maintaining nutritional needs  Description: Monitor and assess patient's nutrition/hydration status for malnutrition  Collaborate with interdisciplinary team and initiate plan and interventions as ordered  Monitor patient's weight and dietary intake as ordered or per policy  Utilize nutrition screening tool and intervene as necessary  Determine patient's food preferences and provide high-protein, high-caloric foods as appropriate       INTERVENTIONS:  - Monitor oral intake, urinary output, labs, and treatment plans  - Assess nutrition and hydration status and recommend course of action  - Evaluate amount of meals eaten  - Assist patient with eating if necessary   - Allow adequate time for meals  - Recommend/ encourage appropriate diets, oral nutritional supplements, and vitamin/mineral supplements  - Order, calculate, and assess calorie counts as needed  - Recommend, monitor, and adjust tube feedings and TPN/PPN based on assessed needs  - Assess need for intravenous fluids  - Provide specific nutrition/hydration education as appropriate  - Include patient/family/caregiver in decisions related to nutrition  Outcome: Progressing     Problem: SAFETY,RESTRAINT: NV/NON-SELF DESTRUCTIVE BEHAVIOR  Goal: Remains free of harm/injury (restraint for non violent/non self-detsructive behavior)  Description: INTERVENTIONS:  - Instruct patient/family regarding restraint use   - Assess and monitor physiologic and psychological status   - Provide interventions and comfort measures to meet assessed patient needs   - Identify and implement measures to help patient regain control  - Assess readiness for release of restraint   Outcome: Progressing  Goal: Returns to optimal restraint-free functioning  Description: INTERVENTIONS:  - Assess the patient's behavior and symptoms that indicate continued need for restraint  - Identify and implement measures to help patient regain control  - Assess readiness for release of restraint   Outcome: Progressing

## 2023-04-28 NOTE — PHYSICAL THERAPY NOTE
Physical Therapy Cancellation Note         04/28/23 0813   PT Last Visit   PT Visit Date 04/28/23   Note Type   Note type Evaluation; Cancelled Session   Cancel Reasons Intubated/sedated     [facility] PT, DPT

## 2023-04-29 ENCOUNTER — APPOINTMENT (INPATIENT)
Dept: RADIOLOGY | Facility: HOSPITAL | Age: 63
End: 2023-04-29

## 2023-04-29 PROBLEM — E87.29 HIGH ANION GAP METABOLIC ACIDOSIS: Status: RESOLVED | Noted: 2018-05-15 | Resolved: 2023-04-29

## 2023-04-29 PROBLEM — R57.9 SHOCK (HCC): Status: RESOLVED | Noted: 2023-04-25 | Resolved: 2023-04-29

## 2023-04-29 PROBLEM — E87.1 HYPONATREMIA: Status: RESOLVED | Noted: 2018-05-15 | Resolved: 2023-04-29

## 2023-04-29 PROBLEM — E72.20 HYPERAMMONEMIA (HCC): Status: RESOLVED | Noted: 2023-04-25 | Resolved: 2023-04-29

## 2023-04-29 PROBLEM — R73.03 PREDIABETES: Status: ACTIVE | Noted: 2023-04-25

## 2023-04-29 PROBLEM — E87.20 LACTIC ACIDOSIS: Status: RESOLVED | Noted: 2018-05-15 | Resolved: 2023-04-29

## 2023-04-29 LAB
ALBUMIN SERPL BCP-MCNC: 1.8 G/DL (ref 3.5–5)
ALP SERPL-CCNC: 209 U/L (ref 46–116)
ALT SERPL W P-5'-P-CCNC: 57 U/L (ref 12–78)
ANION GAP SERPL CALCULATED.3IONS-SCNC: 3 MMOL/L (ref 4–13)
ANION GAP SERPL CALCULATED.3IONS-SCNC: 4 MMOL/L (ref 4–13)
AST SERPL W P-5'-P-CCNC: 89 U/L (ref 5–45)
BACTERIA SPT RESP CULT: ABNORMAL
BACTERIA SPT RESP CULT: ABNORMAL
BILIRUB DIRECT SERPL-MCNC: 1.36 MG/DL (ref 0–0.2)
BILIRUB SERPL-MCNC: 1.73 MG/DL (ref 0.2–1)
BUN SERPL-MCNC: 19 MG/DL (ref 5–25)
BUN SERPL-MCNC: 23 MG/DL (ref 5–25)
CALCIUM SERPL-MCNC: 7.2 MG/DL (ref 8.3–10.1)
CALCIUM SERPL-MCNC: 7.6 MG/DL (ref 8.3–10.1)
CHLORIDE SERPL-SCNC: 112 MMOL/L (ref 96–108)
CHLORIDE SERPL-SCNC: 116 MMOL/L (ref 96–108)
CO2 SERPL-SCNC: 24 MMOL/L (ref 21–32)
CO2 SERPL-SCNC: 27 MMOL/L (ref 21–32)
CREAT SERPL-MCNC: 1.32 MG/DL (ref 0.6–1.3)
CREAT SERPL-MCNC: 1.34 MG/DL (ref 0.6–1.3)
ERYTHROCYTE [DISTWIDTH] IN BLOOD BY AUTOMATED COUNT: 15.1 % (ref 11.6–15.1)
GFR SERPL CREATININE-BSD FRML MDRD: 57 ML/MIN/1.73SQ M
GFR SERPL CREATININE-BSD FRML MDRD: 58 ML/MIN/1.73SQ M
GLUCOSE SERPL-MCNC: 111 MG/DL (ref 65–140)
GLUCOSE SERPL-MCNC: 112 MG/DL (ref 65–140)
GLUCOSE SERPL-MCNC: 120 MG/DL (ref 65–140)
GLUCOSE SERPL-MCNC: 128 MG/DL (ref 65–140)
GLUCOSE SERPL-MCNC: 93 MG/DL (ref 65–140)
GRAM STN SPEC: ABNORMAL
GRAM STN SPEC: ABNORMAL
HCT VFR BLD AUTO: 22.3 % (ref 36.5–49.3)
HGB BLD-MCNC: 7.3 G/DL (ref 12–17)
MAGNESIUM SERPL-MCNC: 2 MG/DL (ref 1.6–2.6)
MAGNESIUM SERPL-MCNC: 2.2 MG/DL (ref 1.6–2.6)
MCH RBC QN AUTO: 33.2 PG (ref 26.8–34.3)
MCHC RBC AUTO-ENTMCNC: 32.7 G/DL (ref 31.4–37.4)
MCV RBC AUTO: 101 FL (ref 82–98)
PHOSPHATE SERPL-MCNC: 1.7 MG/DL (ref 2.3–4.1)
PHOSPHATE SERPL-MCNC: 4.1 MG/DL (ref 2.3–4.1)
PLATELET # BLD AUTO: 74 THOUSANDS/UL (ref 149–390)
PMV BLD AUTO: 8.9 FL (ref 8.9–12.7)
POTASSIUM SERPL-SCNC: 4 MMOL/L (ref 3.5–5.3)
POTASSIUM SERPL-SCNC: 4.7 MMOL/L (ref 3.5–5.3)
PROT SERPL-MCNC: 4.8 G/DL (ref 6.4–8.4)
RBC # BLD AUTO: 2.2 MILLION/UL (ref 3.88–5.62)
SODIUM SERPL-SCNC: 143 MMOL/L (ref 135–147)
SODIUM SERPL-SCNC: 143 MMOL/L (ref 135–147)
WBC # BLD AUTO: 19.34 THOUSAND/UL (ref 4.31–10.16)

## 2023-04-29 RX ORDER — QUETIAPINE FUMARATE 25 MG/1
25 TABLET, FILM COATED ORAL ONCE
Status: COMPLETED | OUTPATIENT
Start: 2023-04-29 | End: 2023-04-30

## 2023-04-29 RX ORDER — LORAZEPAM 2 MG/ML
1 INJECTION INTRAMUSCULAR ONCE
Status: COMPLETED | OUTPATIENT
Start: 2023-04-29 | End: 2023-04-29

## 2023-04-29 RX ORDER — LEVALBUTEROL 1.25 MG/.5ML
1.25 SOLUTION, CONCENTRATE RESPIRATORY (INHALATION)
Status: DISCONTINUED | OUTPATIENT
Start: 2023-04-29 | End: 2023-04-29

## 2023-04-29 RX ORDER — VANCOMYCIN HYDROCHLORIDE 1 G/200ML
1000 INJECTION, SOLUTION INTRAVENOUS EVERY 24 HOURS
Status: DISCONTINUED | OUTPATIENT
Start: 2023-04-29 | End: 2023-04-29

## 2023-04-29 RX ORDER — LORAZEPAM 2 MG/ML
4 INJECTION INTRAMUSCULAR ONCE
Status: CANCELLED | OUTPATIENT
Start: 2023-04-29 | End: 2023-04-29

## 2023-04-29 RX ORDER — FORMOTEROL FUMARATE 20 UG/2ML
20 SOLUTION RESPIRATORY (INHALATION)
Status: DISCONTINUED | OUTPATIENT
Start: 2023-04-29 | End: 2023-05-06 | Stop reason: HOSPADM

## 2023-04-29 RX ORDER — BUDESONIDE 0.5 MG/2ML
0.5 INHALANT ORAL
Status: DISCONTINUED | OUTPATIENT
Start: 2023-04-29 | End: 2023-05-06 | Stop reason: HOSPADM

## 2023-04-29 RX ORDER — FUROSEMIDE 10 MG/ML
20 INJECTION INTRAMUSCULAR; INTRAVENOUS ONCE
Status: COMPLETED | OUTPATIENT
Start: 2023-04-29 | End: 2023-04-29

## 2023-04-29 RX ORDER — MIDODRINE HYDROCHLORIDE 5 MG/1
10 TABLET ORAL
Status: DISCONTINUED | OUTPATIENT
Start: 2023-04-29 | End: 2023-05-06 | Stop reason: HOSPADM

## 2023-04-29 RX ORDER — FORMOTEROL FUMARATE 20 UG/2ML
20 SOLUTION RESPIRATORY (INHALATION)
Status: DISCONTINUED | OUTPATIENT
Start: 2023-04-29 | End: 2023-04-29

## 2023-04-29 RX ORDER — ALBUMIN, HUMAN INJ 5% 5 %
25 SOLUTION INTRAVENOUS 2 TIMES DAILY
Status: COMPLETED | OUTPATIENT
Start: 2023-04-29 | End: 2023-04-29

## 2023-04-29 RX ORDER — ALBUMIN (HUMAN) 12.5 G/50ML
12.5 SOLUTION INTRAVENOUS EVERY 8 HOURS
Status: DISCONTINUED | OUTPATIENT
Start: 2023-04-29 | End: 2023-04-29

## 2023-04-29 RX ORDER — PANTOPRAZOLE SODIUM 40 MG/10ML
40 INJECTION, POWDER, LYOPHILIZED, FOR SOLUTION INTRAVENOUS
Status: DISCONTINUED | OUTPATIENT
Start: 2023-04-29 | End: 2023-05-02

## 2023-04-29 RX ADMIN — B-COMPLEX W/ C & FOLIC ACID TAB 1 TABLET: TAB at 09:31

## 2023-04-29 RX ADMIN — IPRATROPIUM BROMIDE 0.5 MG: 0.5 SOLUTION RESPIRATORY (INHALATION) at 07:22

## 2023-04-29 RX ADMIN — FORMOTEROL FUMARATE DIHYDRATE 20 MCG: 20 SOLUTION RESPIRATORY (INHALATION) at 11:12

## 2023-04-29 RX ADMIN — LEVALBUTEROL HYDROCHLORIDE 1.25 MG: 1.25 SOLUTION, CONCENTRATE RESPIRATORY (INHALATION) at 07:22

## 2023-04-29 RX ADMIN — ALBUMIN (HUMAN) 25 G: 12.5 INJECTION, SOLUTION INTRAVENOUS at 09:25

## 2023-04-29 RX ADMIN — MICONAZOLE NITRATE: 20 CREAM TOPICAL at 10:14

## 2023-04-29 RX ADMIN — CHLORHEXIDINE GLUCONATE 0.12% ORAL RINSE 15 ML: 1.2 LIQUID ORAL at 10:17

## 2023-04-29 RX ADMIN — PREDNISONE 40 MG: 20 TABLET ORAL at 09:29

## 2023-04-29 RX ADMIN — MIDODRINE HYDROCHLORIDE 10 MG: 5 TABLET ORAL at 10:31

## 2023-04-29 RX ADMIN — NICOTINE 21 MG: 21 PATCH, EXTENDED RELEASE TRANSDERMAL at 10:16

## 2023-04-29 RX ADMIN — IPRATROPIUM BROMIDE 0.5 MG: 0.5 SOLUTION RESPIRATORY (INHALATION) at 19:10

## 2023-04-29 RX ADMIN — POTASSIUM PHOSPHATE, MONOBASIC AND POTASSIUM PHOSPHATE, DIBASIC 30 MMOL: 224; 236 INJECTION, SOLUTION, CONCENTRATE INTRAVENOUS at 10:36

## 2023-04-29 RX ADMIN — THIAMINE HCL TAB 100 MG 100 MG: 100 TAB at 09:29

## 2023-04-29 RX ADMIN — IPRATROPIUM BROMIDE 0.5 MG: 0.5 SOLUTION RESPIRATORY (INHALATION) at 13:03

## 2023-04-29 RX ADMIN — MIDODRINE HYDROCHLORIDE 10 MG: 5 TABLET ORAL at 16:33

## 2023-04-29 RX ADMIN — BUDESONIDE 0.5 MG: 0.5 INHALANT ORAL at 11:12

## 2023-04-29 RX ADMIN — Medication 1 PACKET: at 17:30

## 2023-04-29 RX ADMIN — CEFEPIME 2000 MG: 2 INJECTION, POWDER, FOR SOLUTION INTRAVENOUS at 12:01

## 2023-04-29 RX ADMIN — PANTOPRAZOLE SODIUM 40 MG: 40 INJECTION, POWDER, FOR SOLUTION INTRAVENOUS at 10:17

## 2023-04-29 RX ADMIN — FUROSEMIDE 20 MG: 10 INJECTION, SOLUTION INTRAMUSCULAR; INTRAVENOUS at 11:44

## 2023-04-29 RX ADMIN — MICONAZOLE NITRATE: 20 CREAM TOPICAL at 17:12

## 2023-04-29 RX ADMIN — LORAZEPAM 1 MG: 2 INJECTION INTRAMUSCULAR; INTRAVENOUS at 21:50

## 2023-04-29 RX ADMIN — CEFEPIME 2000 MG: 2 INJECTION, POWDER, FOR SOLUTION INTRAVENOUS at 00:17

## 2023-04-29 RX ADMIN — FOLIC ACID 1000 MCG: 1 TABLET ORAL at 09:29

## 2023-04-29 RX ADMIN — BUDESONIDE 0.5 MG: 0.5 INHALANT ORAL at 19:10

## 2023-04-29 RX ADMIN — DEXMEDETOMIDINE HYDROCHLORIDE 0.8 MCG/KG/HR: 400 INJECTION INTRAVENOUS at 00:28

## 2023-04-29 RX ADMIN — FORMOTEROL FUMARATE DIHYDRATE 20 MCG: 20 SOLUTION RESPIRATORY (INHALATION) at 19:10

## 2023-04-29 RX ADMIN — ALBUMIN (HUMAN) 25 G: 12.5 INJECTION, SOLUTION INTRAVENOUS at 20:24

## 2023-04-29 RX ADMIN — LORAZEPAM 1 MG: 2 INJECTION INTRAMUSCULAR; INTRAVENOUS at 20:21

## 2023-04-29 NOTE — ASSESSMENT & PLAN NOTE
Patient with history of Crohn disease  complicated by transverse colon perforation requiring resection and ileostomy with reversal in 2018    Currently prescribed sulfasalazine in the outpatient setting, however, daughter reports patient not taking his medications at this time    Plan:  -Continue to monitor  -Will need outpatient GI versus colorectal surgery follow-up

## 2023-04-29 NOTE — ASSESSMENT & PLAN NOTE
Patient presented with acute hypoxia and increased work of breathing  Required continuous bipap and ultimately intubated for airway protection in the setting of treating alcohol withdrawal  Sputum culture growing candida, however, per ICU sign out likely contaminant and no need to treat  Extubated on 4/28 to nasal cannula and has been titrated down to 2L  Patient's daughter reports that patient does have O2 at home, but he never uses it  Stage IV COPD based on outpatient documentation    Plan:  -This AM patient in increased respiratory distress, accessory muscle use on 6L NC- back on BIPAP  -Off Bipap, now on 10L midflow  -Ordered troponin, ECG, lactic, Procal   -CXR 5/1 awaiting read    -Etiology unclear - possibly 2/2 metabolic derangements in setting of alcohol ketoacidosis vs  COPD vs  Underlying infection  -Completed 3 doses of IV Solu-Medrol and transition to oral prednisone on 4/28 -we will plan to complete 5-day course and then discontinue   -Prednisone 30 mg today   -Completed 5 days of Cefepime and Vancomycin   -Continue to titrate O2 to maintain SpO2 >88%  -Continue atrovent, performist, and pulmicort  -PRN albuterol

## 2023-04-29 NOTE — ASSESSMENT & PLAN NOTE
A1c 6 2 on presentation      Plan:   -Continue SSI with POC glucose checks  -Will need outpatient evaluation by PCP

## 2023-04-29 NOTE — ASSESSMENT & PLAN NOTE
? , ALT 66, Alk phos 263, T bili 1 10  ? CT CAP 4/25: hepatomegaly and severe hepatic steatosis   ? Chronic hepatitis panel 4/25: negative   ? RUQ U/S 4/27: hepatomegaly with severe hepatic steatosis  Visualized portions of pancreas are normal  Minimal cholelithiasis     ? Plan:   - Trend LFTs

## 2023-04-29 NOTE — ASSESSMENT & PLAN NOTE
Initial ammonia 82  Patient without history of cirrhosis  Was treated with lactulose in the ICU, which has been discontinued  No need to further trend ammonia

## 2023-04-29 NOTE — SPEECH THERAPY NOTE
Speech Language/Pathology    Speech-Language Pathology Bedside Swallow Evaluation      Patient Name: Aster Kidd    Today's Date: 4/29/2023     Problem List  Active Problems:    Lactic acidosis    High anion gap metabolic acidosis    Hyponatremia    Alcohol abuse    Macrocytic anemia    DEBBY (acute kidney injury) ([facility] [address] )    Shock ([facility] [address] )    Acute respiratory failure with hypoxia (HCC)    End stage COPD ([facility] [address] )    Hyperglycemia    Abnormal liver function    Hyperammonemia (HCC)    Hypoxia      Past Medical History  Past Medical History:   Diagnosis Date   • Anxiety    • Asthma    • Cardiac disease    • Cervical stenosis (uterine cervix)    • Chest pain    • Chronic kidney disease    • Colitis    • Colon polyps    • COPD (chronic obstructive pulmonary disease) (HCC)     2L @ HS and PRN   • Coronary artery disease    • Diverticulitis    • Esophageal reflux    • 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HCC)    • Hyperlipidemia    • Hypertension    • IBD (inflammatory bowel disease)    • Myocardial infarction [facility])    • Old myocardial infarction    • Perforation of colon (HCC)    • Type 2 diabetes, diet controlled ([facility] [address] )    • Ulcerative colitis ([facility] [address] )        Past Surgical History  Past Surgical History:   Procedure Laterality Date   • ANGIOPLASTY     • APPENDECTOMY     • COLON SURGERY     • COLONOSCOPY N/A 10/24/2016    Procedure: COLONOSCOPY;  Surgeon: Edenilson Rain MD;  Location: [facility]; Service:    • CORONARY ANGIOPLASTY WITH STENT PLACEMENT     • ESOPHAGOGASTRODUODENOSCOPY N/A 10/24/2016    Procedure: ESOPHAGOGASTRODUODENOSCOPY (EGD); Surgeon: Edenilson Rain MD;  Location: [facility];   Service:    • EXPLORATORY LAPAROTOMY W/ BOWEL RESECTION N/A 5/22/2018    Procedure: EXPLORATORY LAPAROTOMY, ILIOCOLECTOMY, ILIOCOLONIC ANASTAMOSIS, LOOP ILIOSTOMY, REPAIR OF SEROSAL TEAR, EXTENSIVE LYSIS OF ADHESIONS, WOUND VAC PLACEMENT;  Surgeon: Edenilson Rain MD;  Location: [facility];  Service: Colorectal   • HEMICOLECTOMY     • ILEOSTOMY CLOSURE N/A 10/5/2018    Procedure: CLOSURE ILEOSTOMY;  Surgeon: Demi Brambila MD;  Location: [facility];  Service: Colorectal   • OTHER SURGICAL HISTORY      stent indications acute myocardial infarction   • MS COLONOSCOPY FLX DX W/COLLJ SPEC WHEN PFRMD N/A 2/6/2018    Procedure: COLONOSCOPY;  Surgeon: Demi Brambila MD;  [facility]: [facility]; Service: Colorectal   • MS COLONOSCOPY FLX DX W/COLLJ SPEC WHEN PFRMD N/A 10/4/2018    Procedure: COLONOSCOPY;  Surgeon: Demi Brambila MD;  [facility]: [facility]; Service: Colorectal   • TONSILLECTOMY         Summary   Pt presented with s/s suggestive of moderate-severe oropharyngeal dysphagia  Weak labial seal for retrieval, inconsistent anterior loss of liquids at midline  Mild prolonged bolus hold of puree and liquids prior to slowed lingual manipulation and transfer  No significant oral residue  No attempt to masticate small piece of soft solid, ultimately removed from oral cavity  Swallows w/ ?delay and ?reduced rise to palpation  +cough and audible gurgling w/ thin liquids, +cough w/ nectar thick  No overt s/s aspiration w/ puree or honey thick liquids  Risk/s for Aspiration: Mod 2/2 AMS and respiratory status     Recommended Diet: puree/level 1 diet and honey thick liquids   Recommended Form of Meds: crushed with puree   Aspiration precautions and swallowing strategies: upright posture, only feed when fully alert, slow rate of feeding, small bites/sips and no straws  Other Recommendations: Continue frequent oral care        Current Medical Status  Pt is a 63 y o  male who presented [facility] with PMH of alcohol abuse with prior DTs, end-stage COPD, Crohn's disease s/p ileostomy and reversal in 2018, CAD with MI in 2012 with stent in RCA presented to [facility] ED on 4/25 with complaint of shortness of breath for several days  He was hypotensive, tachycardic, and tachypneic with an elevated lactate of 20   Patient required pressor support and BiPAP, subsequently transferred to ICU for further management        24hr events:   Precedex increased to max of 1 for agitation  Levo @1  Vaso was discontinued yesterday  Intubated 4/26, Extubated yesterday 4/28  Afebrile  HR in 80s-90s  -122  MAPs >65  Remains on NC @ 4L  Current Precautions:   Fall      Aspiration     Seizure     Allergies:  No known food allergies    Past medical history:  Please see H&P for details    Special Studies:  CXR 4/26: No acute cardiopulmonary disease      Lines and tubes well-positioned with no pneumothorax  CT head 4/25: No acute intracranial abnormality  CTA dissection protocol chest abdomen pelvis 4/25: 1  No aortic dissection, intramural hematoma, or aortic aneurysm  Patent mesenteric vessels      2  Circumferential mural thickening of the esophagus with an air-fluid level, which may relate to esophagitis and esophageal dysmotility      3  Hepatomegaly with severe hepatic steatosis  Social/Education/Vocational Hx:  Pt lives with family    Swallow Information   Current Risks for Dysphagia & Aspiration: recent intubation, AMS and impulsivity  Current Symptoms/Concerns: coughing with po and change in respiratory status  Current Diet: NPO   Baseline Diet: regular diet and thin liquids      Baseline Assessment   Behavior/Cognition: alert  Speech/Language Status: able to participate in basic conversation, able to follow commands and confused  Patient Positioning: upright in bed  Pain Status/Interventions/Response to Interventions:  No report of or nonverbal indications of pain         Swallow Mechanism Exam  Facial: symmetrical  Labial: WFL  Lingual: decreased ROM  Velum: unable to visualize  Mandible:  decreased ROM  Dentition: adequate  Vocal quality:weak   Volitional Cough: strong/productive   Respiratory Status:  on 4L O2       Consistencies Assessed and Performance   Consistencies Administered: thin liquids, nectar thick, honey thick, puree and "soft solids    Oral Stage: moderate-severe  Weak labial seal w/ inconsistent anterior loss of liquids  Pt unable to masticate soft solid, ultimately removed from oral cavity  Mild prolonged bolus hold followed by slowed lingual transfer  No oral residue w/ puree or liquids  Pharyngeal Stage: moderate-severe  Swallow Mechanics:  Swallowing initiation suspected delayed  Laryngeal rise was palpated and judged to be reduced  Cough and audible gurgling w/ thin liquids, cough/throat clear w/ nectar thick  No overt s/s aspiration w/ puree or honey thick liquids today  Esophageal Concerns: none reported    Strategies and Efficacy: -     Summary and Recommendations (see above)    Results Reviewed with: patient, RN and MD     Treatment Recommended: Yes     Frequency of treatment: As able/appropriate     Patient Stated Goal: \"[place]'s done\"     Dysphagia LTG  -Patient will demonstrate safe and effective oral intake (without overt s/s significant oral/pharyngeal dysphagia including s/s penetration or aspiration) for the highest appropriate diet level       Short Term Goals:  -Pt will tolerate Dysphagia 1/pureed diet and honey thick liquid with no significant s/s oral or pharyngeal dysphagia across 1-3 diagnostic session/s    -Patient will tolerate trials of upgraded food and/or liquid texture with no significant s/s of oral or pharyngeal dysphagia including aspiration across 1-3 diagnostic sessions     -Patient will comply with a Video/Modified Barium Swallow study for more complete assessment of swallowing anatomy/physiology/aspiration risk and to assess efficacy of treatment techniques so as to best guide treatment plan        Speech Therapy Prognosis   Prognosis: fair    Prognosis Considerations: age, medical status, prior medical history, cognitive status and respiratory status      "

## 2023-04-29 NOTE — PROGRESS NOTES
Taco Mcleod is a 63 y o  male who is currently ordered Vancomycin IV with management by the Pharmacy Consult service  Relevant clinical data and objective / subjective history reviewed  Vancomycin Assessment:  Indication and Goal AUC/Trough: Other, concern for septic shock, -600, trough >10  Micro:     Renal Function:  SCr: 1 34 mg/dL   CrCl: 58 4 mL/min  Renal replacement: Not on dialysis  Days of Therapy: 5  Current Dose: 750mg IV q24h  Vancomycin Plan:  New Dosinmg IV q24h  Estimated AUC: 424 mcg*hr/mL  Estimated Trough: 12 mcg/mL  Next Level: 5/1 am  Renal Function Monitoring: Daily BMP and UOP  Pharmacy will continue to follow closely for s/sx of nephrotoxicity, infusion reactions and appropriateness of therapy  BMP and CBC will be ordered per protocol  We will continue to follow the patient’s culture results and clinical progress daily      Diego Pierce, Pharmacist

## 2023-04-29 NOTE — PROGRESS NOTES
INTERNAL MEDICINE RESIDENCY TRANSFER ACCEPTANCE NOTE     Name: Trinity Jonas   Age & Sex: 63 y o  male   MRN: [id number]  Unit/Bed#: MICU 05   Encounter: [id number]  Hospital Stay Days: 4    Accepting team: SOD Team A  Code Status: Level 1 - Full Code  Disposition: Patient requires Level 2 Step Down     ASSESSMENT/PLAN     Principal Problem:    Encephalopathy  Active Problems:    Alcohol abuse    Acute respiratory failure with hypoxia ([facility] )    Crohn disease ([facility] )    Coronary artery disease involving native coronary artery of native heart without angina pectoris    Cigarette nicotine dependence without complication    Macrocytic anemia    DEBBY (acute kidney injury) ([facility] )    End stage COPD ([place] [address] )    Prediabetes    Abnormal liver function      * Encephalopathy  Assessment & Plan  Since extubation, patient has been AAOx1 only  Able to state his name, but is unable to state location, date, situation  Daughter reports he also was unable to identify her when she visited on 4/28  His baseline is AAOx4 without cognitive deficit  Etiology unclear at this time, possibly 2/2 withdrawal vs  Wernicke's vs  Metabolic abnormalities vs  anoxic injury in setting of hypotension  [facility] on presentation negative  Infectious workup has also been negative  vEEG negative  Plan:  · Continue to monitor at this time with recent D/C of many CNS altering medications  · Consider neuro consult with lack of improvement  · Also to consider MRI for further evaluation of underlying cerebral damage  · Delirium precautions  · Promote sleep/wake cycle  · Avoid metabolic derangements  · Continue thiamine and folate supplementation - consider IV high dose thiamine with lack of improvement in mental status    Alcohol abuse  Assessment & Plan  Patient with significant history of alcohol use  Daughter reports drinks from the time he wakes up to the time he sleeps, possibly consuming 8 glasses of apple kenji daily   Ethanol on presentation 45 with negative UDS and ehtylene glycol  Last drink on 4/25 prior to arrival to hospital  Patient managed in ICU with versed, precedex, and propofol and ultimately treated with phenobarbital (4/25: 150 mg, 4/26: 230/650/130/130, 4/27: 130/260, 4/28: 130), which has now been discontinued  With continued encephalopathy, patient underwent vEEG demonstrating mild diffuse cerebral dysfunction of nonspecific etiology  No electrographic seizures or interictal epileptiform discharges were seen      Plan:   · Phenobarb d/c by ICU team  · CIWA protocol ordered  · Thiamine and folate ordered  · Monitor for S/S of withdrawal  · Toxicology consulted in MICU - total phenobarbital dosing recommended max of 2 to 2 5g  · Consider use of high dose IV thiamine in the setting of continued encephalopathy    Acute respiratory failure with hypoxia [facility])  Assessment & Plan  Patient presented with acute hypoxia and increased work of breathing  Required continuous bipap and ultimately intubated for airway protection in the setting of treating alcohol withdrawal  Sputum culture growing candida, however, per ICU sign out likely contaminant and no need to treat  Extubated on 4/28 to nasal cannula and has been titrated down to 2L  Patient's daughter reports that patient does have O2 at home, but he never uses it   Stage IV COPD based on outpatient documentation    Plan:  · Etiology unclear - possibly 2/2 metabolic derangements in setting of alcohol ketoacidosis vs  COPD vs  Underlying infection  · Completed 3 doses of IV Solu-Medrol and transition to oral prednisone on 4/28 -we will plan to complete 5-day course and then discontinue  · Completed 5 days of Cefepime and Vancomycin (finished today)  · Continue to titrate O2 to maintain SpO2 >88%  · Continue atrovent, performist, and pulmicort  · PRN albuterol      Abnormal liver function  Assessment & Plan  Patient presented with , ALT 66, alk phos 263, and T  bili 1 10 with unclear baseline as patient without recent CMP to evaluate  CT of the chest abdomen pelvis completed on 4/25 significant for hepatomegaly and severe hepatic steatosis  Chronic hepatitis panel completed was negative  Right upper quadrant ultrasound done on 4/27 demonstrated hepatomegaly with severe hepatic steatosis without concern for cholecystitis or signs of cirrhosis  Plan:  · Currently AST/ALT have plateaued  · T  bili continues to trend up, however, frequently lags behind  · Possibly in setting of ongoing drinking with appropriate ratio  · Continue to monitor daily CMP/INR      Prediabetes  Assessment & Plan  A1c 6 2 on presentation  Plan:  · Continue SSI with POC glucose checks  · Will need outpatient evaluation by PCP    End stage COPD ([facility] )  Assessment & Plan  Patient maintained in the outpatient setting on albuterol and ipratropium    Plan:  · Continue Atrovent, Pulmicort, and Performist  · Remains on prednisone 40 mg daily  · Continue to titrate supplemental O2 to maintain saturation greater than 88%    DEBBY (acute kidney injury) [facility])  Assessment & Plan  Likely in setting of hypovolemia/prerenal in setting of alcohol ketoacidosis  Presented with Cr 2 87 that has improved to 1 34 today  Unclear baseline    Plan:  · Continue to monitor daily BMP  · Avoid nephrotoxic agents  · Received albumin and one time dose lasix today - monitor I/O and repeat BMP    Macrocytic anemia  Assessment & Plan  Hemoglobin on presentation 12 1 with unclear baseline  Previous labs done in 2018 demonstrate hemoglobin close to 8  With adequate fluid resuscitation patient's hemoglobin dropped to 7-8, which is likely closer to his baseline    B12 within normal limits  Folate significantly low at 2 3  No S/S of active bleed at this time      Plan:  · continue daily folate supplementation  · monitor daily CBC  · Transfuse for Hgb <7 0    Cigarette nicotine dependence without complication  Assessment & Plan  Continue nicotine "patch    Coronary artery disease involving native coronary artery of native heart without angina pectoris  Assessment & Plan  Continue aspirin 81mg and atorvastatin 40mg daily    Crohn disease ([address] )  Assessment & Plan  Patient with history of Crohn disease  complicated by transverse colon perforation requiring resection and ileostomy with reversal in 2018  Currently prescribed sulfasalazine in the outpatient setting, however, daughter reports patient not taking his medications at this time    Plan:  · Continue to monitor  · Will need outpatient GI versus colorectal surgery follow-up    Hyperammonemia (HCC)-resolved as of 4/29/2023  Assessment & Plan  Initial ammonia 82  Patient without history of cirrhosis  Was treated with lactulose in the ICU, which has been discontinued  No need to further trend ammonia  Shock (HCC)-resolved as of 4/29/2023  Assessment & Plan  Unclear source of shock - septic vs  Hypovolemic in setting of diarrhea  Received sepsis bolus  Was supported on levophed and vasopressin, which were discontinued today  Patient treated with 5 days of cefepime and vancomycin  ID consulted and recommended discontinuing antibiotics without clear sign/source of infection  High anion gap metabolic acidosis-resolved as of 4/29/2023  Assessment & Plan  Likely secondary to lactic acidosis      VTE Pharmacologic Prophylaxis: held in ICU - consider starting on transfer  VTE Mechanical Prophylaxis: sequential compression device    HOSPITAL COURSE     Per Dr Yolande Wei H&P and transfer note:  \"Mathew Cancino is a 63 y o  male who presents with shortness of breath for a couple of days that worsened this morning  PMH stage 4 COPD, alcohol abuse, tobacco abuse, Crohn's disease s/p ileostomy and reversal in 2018  Patient states the only medications that he takes are albuterol and ipratropium  He endorses drinking several glasses of kenji daily, last drink was last night  Denies history of seizures   ED workup " "demonstrates lactic acidosis of 20 3, leukocytosis WBC 20 57, ammonia 82  [facility] 4/25 negative  CT CAP 4/25 showed circumferential mural thickening of esophagus with air-fluid level and hepatomegaly with severe hepatic steatosis  He was hypotensive, tachycardic, and tachypneic with an elevated lactate of 20  Patient required pressor support and BiPAP, subsequently transferred to ICU for further management  \"    Patient required pressor support and BiPAP, and was transferred to ICU for further management  Suspect he had alcoholic ketoacidosis  Ultimately received phenobarbital ~1 8g and required intubation on 4/26 for airway protection  Extubated 4/28  Was on vEEG for 24 hours 4/27-28, no seizures  He's been off pressors and hemodynamically stable  Stable for transfer to [place] [address]  SUBJECTIVE     Patient seen and evaluated at bedside  Reports no acute complaints at this time  Is able to state his name only, but unable to name date/location/situtation  Discussed with daughter who reports this mental status is significant change from baseline  Reports he drinks 8 glasses of apple kenji daily, and drinks from the time he wakes up until when he falls asleep  She puts his meds out for him, but he refuses to take them  He has had no recent illness and no sick contacts  Denies recreational drug use that she is aware of  OBJECTIVE     Vitals:    04/29/23 1817 04/29/23 1912 04/29/23 1918 04/29/23 2000   BP:   138/65 117/61   BP Location:       Pulse: (!) 110  100 (!) 106   Resp: 21  16 16   Temp:       TempSrc:       SpO2: 96% 92% 100% (!) 88%   Weight:       Height:         I/O last 24 hours: In: 1883 2 [P O :300; I V :358 2; IV Piggyback:1225]  Out: 8732 [Urine:3335; Stool:100]    Physical Exam  Vitals reviewed  Constitutional:       General: He is awake  He is not in acute distress  Appearance: Normal appearance  He is well-developed  He is not ill-appearing, toxic-appearing or diaphoretic     HENT:      Head: " Normocephalic and atraumatic  Right Ear: External ear normal       Left Ear: External ear normal       Nose: Nose normal       Mouth/Throat:      Mouth: Mucous membranes are moist       Pharynx: Oropharynx is clear  Eyes:      General:         Right eye: No discharge  Left eye: No discharge  Conjunctiva/sclera: Conjunctivae normal    Cardiovascular:      Rate and Rhythm: Normal rate and regular rhythm  Pulmonary:      Effort: Pulmonary effort is normal  No respiratory distress  Breath sounds: Normal breath sounds  Abdominal:      General: Bowel sounds are normal  There is no distension  Palpations: Abdomen is soft  Tenderness: There is no abdominal tenderness  Musculoskeletal:         General: No swelling or tenderness  Normal range of motion  Cervical back: Normal range of motion  Right lower leg: No edema  Left lower leg: No edema  Skin:     General: Skin is warm and dry  Coloration: Skin is not jaundiced  Findings: Bruising present  Neurological:      General: No focal deficit present  Mental Status: He is alert  Motor: Weakness (general diffuse) present  Comments: AAOx1 - self only   Psychiatric:         Mood and Affect: Mood normal          Behavior: Behavior normal  Behavior is cooperative  Thought Content: Thought content normal        LABORATORY DATA     Labs: I have personally reviewed pertinent reports      Results from last 7 days   Lab Units 04/29/23  0508 04/28/23  0443 04/27/23  1615 04/27/23  0617 04/26/23  1355 04/26/23  0429 04/25/23  1346 04/25/23  1045   WBC Thousand/uL 19 34* 15 14*  --  8 81  --  10 52*  --  20 57*   HEMOGLOBIN g/dL 7 3* 7 4* 7 3* 7 2*   < > 8 2*   < > 12 1   HEMATOCRIT % 22 3* 22 0* 21 8* 21 3*   < > 24 3*   < > 37 7   PLATELETS Thousands/uL 74* 98*  --  83*  --  74*  --  155   NEUTROS PCT %  --   --   --  91*  --  86*  --  85*   MONOS PCT %  --   --   --  3*  --  7  --  7    < > = values in this interval not displayed  Results from last 7 days   Lab Units 04/29/23  1814 04/29/23  0508 04/28/23  1724 04/28/23  0447 04/28/23  0443 04/27/23  0452 04/27/23  0450   POTASSIUM mmol/L 4 7 4 0 4 7   < >  --    < > 3 8   CHLORIDE mmol/L 112* 116* 111*   < >  --    < > 99   CO2 mmol/L 27 24 22   < >  --    < > 21   BUN mg/dL 19 23 26*   < >  --    < > 33*   CREATININE mg/dL 1 32* 1 34* 1 57*   < >  --    < > 2 09*   CALCIUM mg/dL 7 6* 7 2* 7 3*   < >  --    < > 7 7*   ALK PHOS U/L  --  209*  --   --  169*  --  158*   ALT U/L  --  57  --   --  46  --  53   AST U/L  --  89*  --   --  89*  --  134*    < > = values in this interval not displayed  Results from last 7 days   Lab Units 04/29/23  1814 04/29/23  0508 04/28/23  1724   MAGNESIUM mg/dL 2 0 2 2 2 5     Results from last 7 days   Lab Units 04/29/23  1814 04/29/23  0508 04/28/23  1724   PHOSPHORUS mg/dL 4 1 1 7* 3 2      Results from last 7 days   Lab Units 04/28/23  0443 04/27/23  0557 04/26/23  0429 04/25/23  1045   INR  1 32* 1 35* 1 30* 1 21*   PTT seconds  --   --   --  28     Results from last 7 days   Lab Units 04/26/23  0850   LACTIC ACID mmol/L 2 6*         Micro:  Lab Results   Component Value Date    BLOODCX No Growth After 4 Days  04/25/2023    BLOODCX No Growth After 4 Days  04/25/2023    BLOODCX No Growth After 5 Days  08/07/2018    BLOODCX No Growth After 5 Days  08/07/2018    URINECX 80,000-89,000 cfu/ml 04/25/2023    URINECX 6447-2286 cfu/ml Staphylococcus coagulase negative (A) 08/07/2018    URINECX 1071-8574 cfu/ml Gram Negative Young Enteric Like (A) 08/07/2018    SPUTUMCULTUR 1+ Growth of - Presumptive Candida albicans (A) 04/27/2023    SPUTUMCULTUR Few Colonies of 04/27/2023     IMAGING & DIAGNOSTIC TESTING     Imaging: I have personally reviewed pertinent reports  XR abdomen 1 view kub    Result Date: 4/26/2023  Impression: Nasogastric tube tip terminates at the mid stomach   Possible mild small bowel distention in the left mid abdomen, nonspecific  This can be reassessed on follow-up  Workstation performed: [id number]     XR abdomen 1 view kub    Result Date: 4/26/2023  Impression: Nasogastric tube has been inserted, tip seen passing towards the stomach, distal end not fully seen  Workstation performed: [id number]     CT head without contrast    Result Date: 4/25/2023  Impression: No acute intracranial abnormality  Workstation performed: [id number]     CTA dissection protocol chest abdomen pelvis w wo contrast    Result Date: 4/25/2023  Impression: 1  No aortic dissection, intramural hematoma, or aortic aneurysm  Patent mesenteric vessels  2  Circumferential mural thickening of the esophagus with an air-fluid level, which may relate to esophagitis and esophageal dysmotility  3  Hepatomegaly with severe hepatic steatosis  I personally discussed this study with Dr Milena Dorantes on 4/25/2023 1:15 PM at 4/25/2023 1:36 PM  Workstation performed: [id number]     XR chest portable ICU    Result Date: 4/28/2023  Impression: No acute cardiopulmonary disease  Lines and tubes well-positioned with no pneumothorax  Workstation performed: [id number]     XR chest portable ICU    Result Date: 4/26/2023  Impression: No active pulmonary disease  Workstation performed: [id number]     XR chest portable ICU    Result Date: 4/26/2023  Impression: Right IJ line tip has been placed, tip overlies the SVC  No active pulmonary disease  Workstation performed: [id number]     US right upper quadrant    Result Date: 4/27/2023  Impression: 1  Hepatomegaly with severe hepatic steatosis suggested  2   Visualized portions of the pancreas are normal  3   Minimal cholelithiasis  Workstation performed: [id number]     EKG, Pathology, and Other Studies: I have personally reviewed pertinent reports       ALLERGIES     Allergies   Allergen Reactions   • Medical Tape    • Other      Brown cloth band aids      • Latex Rash     MEDICATIONS     Current "Facility-Administered Medications   Medication Dose Route Frequency Provider Last Rate   • Albumin 5%  25 g Intravenous BID Roxana Nassar MD Stopped (04/29/23 1101)   • albuterol  2 5 mg Nebulization Q4H PRN Shweta Pizarro MD     • budesonide  0 5 mg Nebulization Q12H Roxana Nassar MD     • chlorhexidine  15 mL Mouth/Throat Q12H [facility] Roxana Nassar MD     • folic acid  9,872 mcg Oral Daily Roxana Nassar MD     • formoterol  20 mcg Nebulization Q12H Roxana Nassar MD     • insulin lispro  2-12 Units Subcutaneous [facility] Angelica Zhao DO     • ipratropium  0 5 mg Nebulization TID Shweta Pizarro MD     • LORazepam  1 mg Intravenous Once Alberto Goddard MD     • midodrine  10 mg Oral TID AC Corky Ferreira MD     • ANSHU ANTIFUNGAL   Topical BID AMALIA Wilks     • multivitamin stress formula  1 tablet Oral Daily Roxana Nassar MD     • nicotine  21 mg Transdermal Daily Alberto Goddard MD     • pantoprazole  40 mg Intravenous Q24H [facility] Corky Ferreira MD     • predniSONE  40 mg Oral Daily Corky Ferreira MD     • psyllium  1 packet Oral Daily Roxana Nassar MD     • thiamine  100 mg Oral Daily Corky Ferreira MD          albuterol, 2 5 mg, Q4H PRN        Portions of the record may have been created with voice recognition software  Occasional wrong word or \"sound a like\" substitutions may have occurred due to the inherent limitations of voice recognition software    Read the chart carefully and recognize, using context, where substitutions have occurred     ==  Ganga Madrigal, [address] [facility]  Internal Medicine Residency PGY-3  "

## 2023-04-29 NOTE — ASSESSMENT & PLAN NOTE
? Low folate 2 3   ? Vitamin B12 wnl (100)  ?  Plan:   - Folate 1000 mcg daily   - Trend H&H and monitor for s/s bleeding

## 2023-04-29 NOTE — ASSESSMENT & PLAN NOTE
Patient maintained in the outpatient setting on albuterol and ipratropium    Plan:  -Continue Atrovent, Pulmicort, and Performist  -Prednisone taper stopped due to respiratory distress   -Now on Prednisone 40 mg daily   -Continue to titrate supplemental O2 to maintain saturation greater than 88%

## 2023-04-29 NOTE — ASSESSMENT & PLAN NOTE
? Home albuterol and ipratropium   ?  Plan:   - Continue ipratropium TID   - Prednisone 40mg daily  - Added Budesonide and Formoterol nebs BID 4/29

## 2023-04-29 NOTE — ASSESSMENT & PLAN NOTE
Likely in setting of hypovolemia/prerenal in setting of alcohol ketoacidosis  Presented with Cr 2 87 that has improved to 1 34 today   Unclear baseline    Plan:  -Cr 1 1 -> 1 09  -Continue to monitor daily BMP  -Avoid nephrotoxic agents

## 2023-04-29 NOTE — ASSESSMENT & PLAN NOTE
? Pulmonary edema vs worsening COPD   ? BNP 3297 (330 in 2016)   ? CT CAP 4/25: moderate centrilobar emphysema  A 2 mm right middle lobe pulmonary nodule (4/147) and a 3 mm right lower lobe solid pulm nodule (4/193), stable since 5/15/2018, therefore consistent with a benign etiology  No new or enlarging pulmonary nodules  ? Intubated 4/26 for airway protection and worsening hypercapnia   ? Sputum Cx 4/27: pending   ? Procalcitonin 4/28: 1 90  ? Extubated 4/28   ?  Plan:   - Follow-up sputum cx

## 2023-04-29 NOTE — ASSESSMENT & PLAN NOTE
? H/o transverse colon perforation s/p ileostomy and reversal in 2018  ? Currently does not take any medications for Crohns   ? CT CAP 4/25: no bowel obstruction  Underdistended descending colon   Postsurgical changes of partial right hemicolectomy

## 2023-04-29 NOTE — CONSULTS
Lori Tom is a 63 y o  male who was receiving Vancomycin IV with management by the Pharmacy Consult service for treatment of Other concern for septic shock  The patient's Vancomycin therapy has been completed / discontinued  Thank you for allowing us to take part in this patient's care  Pharmacy will sign-off now; please call or re-consult if there are any questions  Amy Marie, PharmD   [address] [facility]

## 2023-04-29 NOTE — ASSESSMENT & PLAN NOTE
Suspected septic shock, reports significant diarrhea prior to arrival and therefore could be hypovolemia  ? Received 30 cc/kg IVF bolus  ? TTE 9/15/5317: systolic function mildly reduced to low normal  EF 52%

## 2023-04-29 NOTE — ASSESSMENT & PLAN NOTE
Hemoglobin on presentation 12 1 with unclear baseline  Previous labs done in 2018 demonstrate hemoglobin close to 8  With adequate fluid resuscitation patient's hemoglobin dropped to 7-8, which is likely closer to his baseline    B12 within normal limits  Folate significantly low at 2 3  No S/S of active bleed at this time      Plan:  -HGB 7 1 -> 8 8 today   -continue daily folate supplementation  -monitor daily CBC  -Transfuse for Hgb <7 0

## 2023-04-29 NOTE — PROGRESS NOTES
Transfer Note - ICU/Stepdown Transfer to [facility]/MS tele   Indigo Sit 63 y o  male MRN: [id number]  [address]   [facility]: [id number]    Code Status: Level 1 - Full Code    Reason for ICU/Stepdown admission: hypotension requiring pressors     Active problems:     Alcohol abuse  Assessment & Plan  ? Ethanol 4/25: 45  ? Ethylene glycol and methanol 4/25: negative   ? UDS 4/25: negative   ? Last drink 4/25 prior to EMS arrival  ? Phenobarbital  - 4/25: 158 mg  - 4/26: 230/650/130/130   - 4/27: 130/260   - 4/28: 130   ? vEEG 4/27: mild diffuse cerebral dysfunction of nonspecific etiology  No electrographic seizures or interictal epileptiform discharges were seen  ? Plan:   - [facility] protocol  - Thiamine, folate   - Fomepizole started 4/25, discontinued 4/26   - Monitor for alcohol withdrawal   - Toxicology consulted - total phenobarbital dosing recommended max of 2 to 2 5g  If reaching 2g and need additional treatment, adjunctive measures could include precedex and ketamine, as needed benzodiazepines  Hyperammonemia [facility])  Assessment & Plan  ? Resolved  ? Ammonia POA 82, most recent 32   ? Plan:   - Discontinued lactulose    Abnormal liver function  Assessment & Plan  ? , ALT 66, Alk phos 263, T bili 1 10  ? CT CAP 4/25: hepatomegaly and severe hepatic steatosis   ? Chronic hepatitis panel 4/25: negative   ? RUQ U/S 4/27: hepatomegaly with severe hepatic steatosis  Visualized portions of pancreas are normal  Minimal cholelithiasis  ? Plan:   - Trend LFTs    Hyperglycemia  Assessment & Plan  ? A1c 4/25: 8  ? Plan:  - SSI     End stage COPD [facility])  Assessment & Plan  ? Home albuterol and ipratropium   ? Plan:   - Continue ipratropium TID   - Prednisone 40mg daily  - Added Budesonide and Formoterol nebs BID 4/29    Acute respiratory failure with hypoxia [facility])  Assessment & Plan  ? Pulmonary edema vs worsening COPD   ? BNP 3297 (330 in 2016)   ?  CT CAP 4/25: moderate centrilobar emphysema  A 2 mm right middle lobe pulmonary nodule (4/147) and a 3 mm right lower lobe solid pulm nodule (4/193), stable since 5/15/2018, therefore consistent with a benign etiology  No new or enlarging pulmonary nodules  ? Intubated 4/26 for airway protection and worsening hypercapnia   ? Sputum Cx 4/27: pending   ? Procalcitonin 4/28: 1 90  ? Extubated 4/28   ? Plan:   - Follow-up sputum cx     Shock [facility])  Assessment & Plan  Suspected septic shock, reports significant diarrhea prior to arrival and therefore could be hypovolemia  ? Received 30 cc/kg IVF bolus  ? TTE 1/35/6013: systolic function mildly reduced to low normal  EF 52%  DEBBY (acute kidney injury) [facility])  Assessment & Plan  ? Unclear etiology at this time  ? Cr 1 34, down from 2 87 on presentation  Baseline 0 9-1 2   ? Plan:    - Trend BUN/Cr     Macrocytic anemia  Assessment & Plan  ? Low folate 2 3   ? Vitamin B12 wnl (682)  ? Plan:   - Folate 1000 mcg daily   - Trend H&H and monitor for s/s bleeding     Coronary artery disease involving native coronary artery of native heart without angina pectoris  Assessment & Plan  ? Home atorvastatin 40mg daily   ? Plan:   - Continue home statin     Crohn disease ([facility] [address] )  Assessment & Plan  ? H/o transverse colon perforation s/p ileostomy and reversal in 2018  ? Currently does not take any medications for Crohns   ? CT CAP 4/25: no bowel obstruction  Underdistended descending colon  Postsurgical changes of partial right hemicolectomy        Consultants:   · Medical toxicology     History of Present Illness/Summary of clinical course:     63year old male PMH of alcohol abuse with prior DTs, end-stage COPD, Crohn's disease s/p ileostomy and reversal in 2018, CAD with MI in 2012 with stent in RCA presented to [facility] ED on 4/25 with complaint of shortness of breath for several days  He was hypotensive, tachycardic, and tachypneic with an elevated lactate of 20   Patient required pressor support and BiPAP, subsequently transferred to ICU for further management  Please refer to today's progress note for further clinical details  Recent or scheduled procedures:   4/25 CVC placed in R IJ   4/26 intubation  4/28 extubation     Outstanding/pending diagnostics:   N/A       Mobilization Plan: PT/OT and OOB as tolerated     Nutrition Plan: Pureed honey thick liquid diet       Spoke with Dr Dannielle Carrero regarding transfer @ 4/29/2023 17:45  Patient accepted to their service      Irene David MD

## 2023-04-29 NOTE — ASSESSMENT & PLAN NOTE
Unclear source of shock - septic vs  Hypovolemic in setting of diarrhea  Received sepsis bolus  Was supported on levophed and vasopressin, which were discontinued today  Patient treated with 5 days of cefepime and vancomycin  ID consulted and recommended discontinuing antibiotics without clear sign/source of infection

## 2023-04-29 NOTE — ASSESSMENT & PLAN NOTE
Patient with significant history of alcohol use  Daughter reports drinks from the time he wakes up to the time he sleeps, possibly consuming 8 glasses of apple kenji daily  Ethanol on presentation 45 with negative UDS and ehtylene glycol  Last drink on 4/25 prior to arrival to hospital  Patient managed in ICU with versed, precedex, and propofol and ultimately treated with phenobarbital (4/25: 150 mg, 4/26: 230/650/130/130, 4/27: 130/260, 4/28: 130), which has now been discontinued  With continued encephalopathy, patient underwent vEEG demonstrating mild diffuse cerebral dysfunction of nonspecific etiology  No electrographic seizures or interictal epileptiform discharges were seen      Plan:   -Phenobarb d/c by ICU team  -CIWA protocol ordered  -Thiamine and folate ordered  -Monitor for S/S of withdrawal  -S/P IV Thiamine

## 2023-04-29 NOTE — PROGRESS NOTES
Daily Progress Note - Critical Care   Bhavana Mckinnon 63 y o  male MRN: [id number]  Unit/Bed#: [facility] 05 Encounter: [id number]        ----------------------------------------------------------------------------------------  HPI    63year old male PMH of alcohol abuse with prior DTs, end-stage COPD, Crohn's disease s/p ileostomy and reversal in 2018, CAD with MI in 2012 with stent in RCA presented to [facility] ED on 4/25 with complaint of shortness of breath for several days  He was hypotensive, tachycardic, and tachypneic with an elevated lactate of 20  Patient required pressor support and BiPAP, subsequently transferred to ICU for further management  24hr events:   Precedex increased to max of 1 for agitation  Levo @1  Vaso was discontinued yesterday  Extubated yesterday 4/28  Afebrile  HR in 80s-90s  -122  MAPs >65  Remains on NC @ 4L      ---------------------------------------------------------------------------------------  SUBJECTIVE  Denies any chest pain, abdominal pain, nausea  Review of Systems  Review of systems was reviewed and negative unless stated above in HPI/24-hour events   ---------------------------------------------------------------------------------------  Assessment and Plan:    Neuro:   • Diagnosis: Alcohol abuse   o Ethanol 4/25: 45  o Ethylene glycol and methanol 4/25: negative   o UDS 4/25: negative   o Last drink 4/25 prior to EMS arrival  o Phenobarbital  - 4/25: 158 mg  - 4/26: 230/650/130/130   - 4/27: 130/260   - 4/28: 130   o vEEG 4/27: mild diffuse cerebral dysfunction of nonspecific etiology  No electrographic seizures or interictal epileptiform discharges were seen  o Plan:   - [facility] protocol  - Thiamine, folate   - Fomepizole started 4/25, discontinued 4/26   - Monitor for alcohol withdrawal   - Toxicology consulted - total phenobarbital dosing recommended max of 2 to 2 5g   If reaching 2g and need additional treatment, adjunctive measures could include precedex and ketamine, as needed benzodiazepines  • Diagnosis: Delirium ppx   o Plan:   - CAM ICU  Sleep-wake regulation  CV:   • Diagnosis: Undifferentiated shock  o Suspect septic shock   o Received 30 cc/kg IVF bolus  o TTE 3/12/8559: systolic function mildly reduced to low normal  EF 52%  o Plan:   - Wean levophed and vaso, maintain MAP >65  - Continue cefepime/flagyl/vanc     • Diagnosis: CAD with MI in 2012 s/p RCA stent   o Home atorvastatin 40mg daily   o Plan:   - Continue home statin       Pulm:  • Diagnosis: Acute hypoxic respiratory failure   o Pulmonary edema vs worsening COPD   o BNP 3297 (330 in 2016)   o CT CAP 4/25: moderate centrilobar emphysema  A 2 mm right middle lobe pulmonary nodule (4/147) and a 3 mm right lower lobe solid pulm nodule (4/193), stable since 5/15/2018, therefore consistent with a benign etiology  No new or enlarging pulmonary nodules  o Intubated 4/26 for airway protection and worsening hypercapnia   o Sputum Cx 4/27: pending   o Procalcitonin 4/28: 1 90  o Extubated 4/28   o Plan:   - Follow-up sputum cx   • Diagnosis: End-stage COPD   o Home albuterol and ipratropium   o Plan:   - Continue atrovent/xopenex TID   - Prednisone 40mg daily      GI:   • Diagnosis: Abnormal liver function   o , ALT 66, Alk phos 263, T bili 1 10  o CT CAP 4/25: hepatomegaly and severe hepatic steatosis   o Chronic hepatitis panel 4/25: negative   o RUQ U/S 4/27: hepatomegaly with severe hepatic steatosis  Visualized portions of pancreas are normal  Minimal cholelithiasis  o Plan:   - Trend LFTs  • Diagnosis: Crohn's disease   ? H/o transverse colon perforation s/p ileostomy and reversal in 2018  ? Currently does not take any medications for Crohns   ? CT CAP 4/25: no bowel obstruction  Underdistended descending colon  Postsurgical changes of partial right hemicolectomy  • Diagnosis: Bloody NGT output  ?  Patient evaluated by acute care surgery in the ED, NGT was inserted due to concern for SBO vs ischemic bowel, ~100 cc of bloody output seen in cannula  ? CT CAP 4/25: circumferential mural thickening of esophagus with an air-fluid level, may be related to esophagitis and esophageal dysmotility  o Plan:   - Monitor NGT output and trend H&H   • Diagnosis: Elevated lipase and amylase   o Possible chronic alcoholic pancreatitis   o Lipase 4/25: 2090 --> 595 on 4/26  Amylase 4/25: 155  o RUQ U/S 4/27: visualized portions of the pancreas are normal        :   • Diagnosis: DEBBY   o Unclear etiology at this time   o Cr 1 34, down from 2 87 on presentation  Baseline 0 9-1 2   o Plan:   - Hernandez in place, strict I/Os   - Trend BUN/Cr   • Diagnosis: High anion gap metabolic acidosis (resolved)   o Secondary to lactic acidosis   o VBG 4/25: pH 7 07, pCO2 39 5, HCO3 11 2   o ABG 4/25: pH 7 286, pCO2 38 9, HCO3 18 1        F/E/N:   • Fluids: no mIVF   • Replete electrolytes as needed   • Nutrition: NPO  SLP to evaluate, advance diet as tolerated  Heme/Onc:   • Diagnosis: Macrocytic anemia   o Low folate 2 3   o Vitamin B12 wnl (682)  o Plan:   - Folate 1000 mcg daily   - Trend H&H and monitor for s/s bleeding   • Diagnosis: DVT ppx   o Plan: SCDs       Endo:   • Diagnosis: Hyperglycemia   o A1c 4/25: 8  o Plan:  - SSI       ID:   • Diagnosis: Septic shock   o No clear source at this time   o U/A microscopic 4/25: small leukocytes, negative nitrite, no bacteria, 30-50 WBC    o Urine cx 4/25: mixed contaminants   o Blood cx 4/25: preliminary result demonstrates no growth at 72 hrs    o MRSA 4/25: positive   o Plan:   - Continue cefepime/flagyl/vanc, de-escalate once final blood cx results       MSK/Skin:   • Diagnosis: DTI ppx   o Plan: Frequent repositioning   PT/OT and OOB as tolerated       Disposition: Continue Critical Care   Code Status: Level 1 - Full Code  ---------------------------------------------------------------------------------------  ICU CORE MEASURES    Prophylaxis   VTE Pharmacologic Prophylaxis: N/A  VTE Mechanical Prophylaxis: sequential compression device  Stress Ulcer Prophylaxis: Prophylaxis Not Indicated     ABCDE Protocol (if indicated)  Plan to perform spontaneous awakening trial today? Yes  Plan to perform spontaneous breathing trial today? Yes  Obvious barriers to extubation? Not applicable  CAM-ICU: Negative    Invasive Devices Review  Invasive Devices     Central Venous Catheter Line  Duration           CVC Central Lines 23 Triple 16cm 3 days          Peripheral Intravenous Line  Duration           Peripheral IV 23 Right;Ventral (anterior) Forearm 1 day          Drain  Duration           Urethral Catheter Temperature probe 3 days              Can any invasive devices be discontinued today? No  ---------------------------------------------------------------------------------------  OBJECTIVE    Vitals   Vitals:    23 0400 23 0545 23 0553 23 0600   BP: 104/66 110/75  110/75   BP Location: Left arm      Pulse: 86 84  82   Resp: 19 18  12   Temp: 97 9 °F (36 6 °C) 98 6 °F (37 °C)  98 6 °F (37 °C)   TempSrc: Bladder      SpO2: 94% 95%  96%   Weight:   80 7 kg (177 lb 14 6 oz)    Height:         Temp (24hrs), Av 5 °F (36 9 °C), Min:96 8 °F (36 °C), Max:99 3 °F (37 4 °C)  Current: Temperature: 98 6 °F (37 °C)    Respiratory:  SpO2: SpO2: 96 %    Invasive/non-invasive ventilation settings   Respiratory    Lab Data (Last 4 hours)    None         O2/Vent Data (Last 4 hours)    None                Physical Exam  Vitals and nursing note reviewed  Constitutional:       General: He is not in acute distress  Appearance: He is not ill-appearing  HENT:      Head: Normocephalic and atraumatic  Cardiovascular:      Rate and Rhythm: Normal rate and regular rhythm  Pulses: Normal pulses  Pulmonary:      Effort: Pulmonary effort is normal  No respiratory distress  Breath sounds: Wheezing present        Comments: NC in place  Abdominal: Palpations: Abdomen is soft  Tenderness: There is no abdominal tenderness  There is no guarding  Hernia: A hernia is present  Genitourinary:     Comments: Hernandez in place   Skin:     General: Skin is warm and dry  Capillary Refill: Capillary refill takes less than 2 seconds  Neurological:      Mental Status: He is alert        Comments: Oriented to self and able to follow simple commands              Laboratory and Diagnostics:  Results from last 7 days   Lab Units 04/29/23  0508 04/28/23  0443 04/27/23  1615 04/27/23  0617 04/26/23  1355 04/26/23  0429 04/25/23  1346 04/25/23  1045   WBC Thousand/uL 19 34* 15 14*  --  8 81  --  10 52*  --  20 57*   HEMOGLOBIN g/dL 7 3* 7 4* 7 3* 7 2* 8 0* 8 2* 11 4* 12 1   HEMATOCRIT % 22 3* 22 0* 21 8* 21 3* 23 4* 24 3* 33 7* 37 7   PLATELETS Thousands/uL 74* 98*  --  83*  --  74*  --  155   NEUTROS PCT %  --   --   --  91*  --  86*  --  85*   MONOS PCT %  --   --   --  3*  --  7  --  7     Results from last 7 days   Lab Units 04/29/23  0508 04/28/23  1724 04/28/23  0447 04/28/23  0443 04/28/23  0007 04/27/23  1615 04/27/23  1135 04/27/23  0452 04/27/23  0450 04/26/23  1355 04/26/23  0850 04/25/23  1801 04/25/23  1704 04/25/23  1045   SODIUM mmol/L 143 138 136  --  134* 132* 132* 130* 130*   < > 126*   < > 133* 125*   POTASSIUM mmol/L 4 0 4 7 3 9  --  3 7 3 7 3 9 3 8 3 8   < > 4 1   < > 3 8 4 7   CHLORIDE mmol/L 116* 111* 108  --  104 104 103 99 99   < > 95*   < > 106 86*   CO2 mmol/L 24 22 24  --  25 24 24 21 21   < > 23   < > 11* 11*   ANION GAP mmol/L 3* 5 4  --  5 4 5 10 10   < > 8   < > 16* 28*   BUN mg/dL 23 26* 26*  --  29* 29* 30* 31* 33*   < > 33*   < > 27* 33*   CREATININE mg/dL 1 34* 1 57* 1 75*  --  1 76* 2 01* 1 97* 2 08* 2 09*   < > 2 44*   < > 2 03* 2 87*   CALCIUM mg/dL 7 2* 7 3* 7 2*  --  6 7* 6 8* 7 0* 7 7* 7 7*   < > 7 5*   < > 5 4* 8 5   GLUCOSE RANDOM mg/dL 93 145* 139  --  172* 260* 229* 205* 203*   < > 149*   < > 118 226*   ALT U/L 57  -- --  46  --   --   --   --  53  --  54  --  47 66   AST U/L 89*  --   --  89*  --   --   --   --  134*  --  140*  --  147* 204*   ALK PHOS U/L 209*  --   --  169*  --   --   --   --  158*  --  168*  --  168* 263*   ALBUMIN g/dL 1 8*  --   --  1 7*  --   --   --   --  1 7*  --  1 9*  --  1 5* 2 1*   TOTAL BILIRUBIN mg/dL 1 73*  --   --  1 53*  --   --   --   --  1 19*  --  1 38*  --  1 28* 1 10*    < > = values in this interval not displayed  Results from last 7 days   Lab Units 04/29/23  0508 04/28/23  1724 04/28/23  0514 04/28/23  0007 04/27/23  1615 04/27/23  1135 04/27/23  0450 04/27/23  0221   MAGNESIUM mg/dL 2 2 2 5 2 9* 1 9 2 2  --  2 5 2 3   PHOSPHORUS mg/dL 1 7* 3 2 2 1* 3 4 1 8* 2 3 1 9* 1 4*      Results from last 7 days   Lab Units 04/28/23  0443 04/27/23  0557 04/26/23  0429 04/25/23  1045   INR  1 32* 1 35* 1 30* 1 21*   PTT seconds  --   --   --  28          Results from last 7 days   Lab Units 04/26/23  0850 04/26/23  0543 04/26/23  0327 04/26/23  0010 04/25/23  1704 04/25/23  1627 04/25/23  1430   LACTIC ACID mmol/L 2 6* 4 1* 5 9* 6 6* 10 2* 11 7* 12 9*     ABG:  Results from last 7 days   Lab Units 04/26/23  1856 04/25/23  1802   PH ART  7 326* 7 286*   PCO2 ART mm Hg 50 7* 38 9   PO2 ART mm Hg 163 2* 296 1*   HCO3 ART mmol/L 25 9 18 1*   BASE EXC ART mmol/L -0 3 -7 9   ABG SOURCE   --  Line, Arterial     VBG:  Results from last 7 days   Lab Units 04/28/23  1443 04/26/23  1701 04/25/23  1802   PH SANAZ  7 327   < >  --    PCO2 SANAZ mm Hg 45 4   < >  --    PO2 SANAZ mm Hg 35 7   < >  --    HCO3 SANAZ mmol/L 23 2*   < >  --    BASE EXC SANAZ mmol/L -2 7   < >  --    ABG SOURCE   --   --  Line, Arterial    < > = values in this interval not displayed       Results from last 7 days   Lab Units 04/28/23  0443 04/25/23  1045   PROCALCITONIN ng/ml 1 90* 1 73*       Micro  Results from last 7 days   Lab Units 04/27/23  1726 04/26/23  1228 04/25/23  1713 04/25/23  1607 04/25/23  1112 04/25/23  1045   BLOOD CULTURE "--   --   --   --  No Growth at 72 hrs  No Growth at 72 hrs  SPUTUM CULTURE  Culture too young- will reincubate  --   --   --   --   --    GRAM STAIN RESULT  Rare Epithelial Cells  No Polys or Bacteria seen  --   --   --   --   --    URINE CULTURE   --   --   --  80,000-89,000 cfu/ml  --   --    MRSA CULTURE ONLY   --   --  Methicillin Resistant Staphylococcus aureus isolated*  --   --   --    C DIFF TOXIN B BY PCR   --  Negative  --   --   --   --        Imaging: I have personally reviewed pertinent reports  Intake and Output  I/O       04/24 0701 04/25 0700 04/25 0701 04/26 0700    I V  (mL/kg)  1837 8 (23 9)    NG/GT  0    IV Piggyback  2950    Total Intake(mL/kg)  4787 8 (62 2)    Urine (mL/kg/hr)  310    Emesis/NG output  2000    Stool  600    Total Output  2910    Net  +1877 8                Height and Weights   Height: 5' 6\" (167 6 cm)  IBW (Ideal Body Weight): 63 8 kg  Body mass index is 28 72 kg/m²  Weight (last 2 days)     Date/Time Weight    04/29/23 0553 80 7 (177 91)    04/28/23 0543 71 8 (158 29)    04/27/23 0530 79 8 (175 93)            Nutrition       Diet Orders   (From admission, onward)             Start     Ordered    04/29/23 0511  Diet NPO  Diet effective now        References:    Nutrtion Support Algorithm Enteral vs  Parenteral   Question Answer Comment   Diet Type NPO    RD to adjust diet per protocol?  Yes        04/29/23 0511                  Active Medications  Scheduled Meds:  Current Facility-Administered Medications   Medication Dose Route Frequency Provider Last Rate   • albuterol  2 5 mg Nebulization Q4H PRN Rosa Parry MD     • cefepime  2,000 mg Intravenous Q12H Deisi Millard DO Stopped (04/29/23 0103)   • chlorhexidine  15 mL Mouth/Throat Q12H [address] Morenita Kolb MD     • dexmedetomidine  0 1-1 2 mcg/kg/hr Intravenous Titrated Forrest Dragon [place], DO 0 6 mcg/kg/hr (99/01/47 3212)   • folic acid  7,312 mcg Oral Daily Morenita Kolb MD     • insulin lispro  2-12 Units " "Subcutaneous Q6H [address],      • ipratropium  0 5 mg Nebulization TID Israel Goode MD     • levalbuterol  1 25 mg Nebulization TID Israel Goode MD     • ANSHU ANTIFUNGAL   Topical BID AMALIA Rueda     • multivitamin stress formula  1 tablet Oral Daily Abdi Nguyen MD     • nicotine  21 mg Transdermal Daily Suman Campbell MD     • norepinephrine  1-30 mcg/min Intravenous Titrated Ling Jennings MD 1 mcg/min (04/29/23 8297)   • pantoprazole  40 mg Intravenous Q12H April Tenorio MD     • potassium phosphate  30 mmol Intravenous Once bAdi Nguyen MD     • predniSONE  40 mg Oral Daily Jamal Garber MD     • thiamine  100 mg Oral Daily Jamal Garber MD     • vancomycin  1,000 mg Intravenous Q24H Israel Goode MD     • vasopressin  0 04 Units/min Intravenous Continuous Irineo Gr MD Stopped (04/28/23 1334)     Continuous Infusions:  dexmedetomidine, 0 1-1 2 mcg/kg/hr, Last Rate: 0 6 mcg/kg/hr (04/29/23 0552)  norepinephrine, 1-30 mcg/min, Last Rate: 1 mcg/min (04/29/23 0552)  vasopressin, 0 04 Units/min, Last Rate: Stopped (04/28/23 1334)      PRN Meds:   albuterol, 2 5 mg, Q4H PRN        Allergies   Allergies   Allergen Reactions   • Medical Tape    • Other      Brown cloth band aids      • Latex Rash     ---------------------------------------------------------------------------------------  Advance Directive and Living Will:      Power of :    POLST:    ---------------------------------------------------------------------------------------    Abdi Nguyen MD      Portions of the record may have been created with voice recognition software  Occasional wrong word or \"sound a like\" substitutions may have occurred due to the inherent limitations of voice recognition software    Read the chart carefully and recognize, using context, where substitutions have occurred  "

## 2023-04-29 NOTE — ASSESSMENT & PLAN NOTE
? Unclear etiology at this time  ? Cr 1 34, down from 2 87 on presentation  Baseline 0 9-1 2   ?  Plan:    - Trend BUN/Cr

## 2023-04-29 NOTE — ASSESSMENT & PLAN NOTE
Patient presented with , ALT 66, alk phos 263, and T  bili 1 10 with unclear baseline as patient without recent CMP to evaluate  CT of the chest abdomen pelvis completed on 4/25 significant for hepatomegaly and severe hepatic steatosis  Chronic hepatitis panel completed was negative  Right upper quadrant ultrasound done on 4/27 demonstrated hepatomegaly with severe hepatic steatosis without concern for cholecystitis or signs of cirrhosis      Plan:  -Currently AST/ALT have plateaued to improving   -T  bili 2 36 -> 2 32 today   -Possibly in setting of ongoing drinking with appropriate ratio  -Continue to monitor daily CMP/INR

## 2023-04-30 ENCOUNTER — APPOINTMENT (INPATIENT)
Dept: RADIOLOGY | Facility: HOSPITAL | Age: 63
End: 2023-04-30

## 2023-04-30 LAB
ABO GROUP BLD: NORMAL
ALBUMIN SERPL BCP-MCNC: 2.6 G/DL (ref 3.5–5)
ALP SERPL-CCNC: 238 U/L (ref 46–116)
ALT SERPL W P-5'-P-CCNC: 69 U/L (ref 12–78)
AMMONIA PLAS-SCNC: 43 UMOL/L (ref 11–35)
ANION GAP SERPL CALCULATED.3IONS-SCNC: 3 MMOL/L (ref 4–13)
ANISOCYTOSIS BLD QL SMEAR: PRESENT
ARTERIAL PATENCY WRIST A: YES
ARTERIAL PATENCY WRIST A: YES
AST SERPL W P-5'-P-CCNC: 133 U/L (ref 5–45)
BACTERIA BLD CULT: NORMAL
BACTERIA BLD CULT: NORMAL
BASE EX.OXY STD BLDV CALC-SCNC: 90.6 % (ref 60–80)
BASE EXCESS BLDA CALC-SCNC: 0.4 MMOL/L
BASE EXCESS BLDA CALC-SCNC: 4.7 MMOL/L
BASE EXCESS BLDV CALC-SCNC: -0.4 MMOL/L
BASOPHILS # BLD MANUAL: 0 THOUSAND/UL (ref 0–0.1)
BASOPHILS NFR MAR MANUAL: 0 % (ref 0–1)
BILIRUB DIRECT SERPL-MCNC: 1.91 MG/DL (ref 0–0.2)
BILIRUB SERPL-MCNC: 2.36 MG/DL (ref 0.2–1)
BLD GP AB SCN SERPL QL: NEGATIVE
BUN SERPL-MCNC: 15 MG/DL (ref 5–25)
CA-I BLD-SCNC: 0.97 MMOL/L (ref 1.12–1.32)
CALCIUM SERPL-MCNC: 7.1 MG/DL (ref 8.3–10.1)
CHLORIDE SERPL-SCNC: 112 MMOL/L (ref 96–108)
CO2 SERPL-SCNC: 29 MMOL/L (ref 21–32)
CREAT SERPL-MCNC: 1.1 MG/DL (ref 0.6–1.3)
EOSINOPHIL # BLD MANUAL: 0 THOUSAND/UL (ref 0–0.4)
EOSINOPHIL NFR BLD MANUAL: 0 % (ref 0–6)
ERYTHROCYTE [DISTWIDTH] IN BLOOD BY AUTOMATED COUNT: 15.5 % (ref 11.6–15.1)
FERRITIN SERPL-MCNC: 2507 NG/ML (ref 8–388)
GFR SERPL CREATININE-BSD FRML MDRD: 72 ML/MIN/1.73SQ M
GLUCOSE SERPL-MCNC: 115 MG/DL (ref 65–140)
GLUCOSE SERPL-MCNC: 145 MG/DL (ref 65–140)
GLUCOSE SERPL-MCNC: 152 MG/DL (ref 65–140)
GLUCOSE SERPL-MCNC: 51 MG/DL (ref 65–140)
GLUCOSE SERPL-MCNC: 66 MG/DL (ref 65–140)
GLUCOSE SERPL-MCNC: 67 MG/DL (ref 65–140)
GLUCOSE SERPL-MCNC: 81 MG/DL (ref 65–140)
GLUCOSE SERPL-MCNC: 84 MG/DL (ref 65–140)
GLUCOSE SERPL-MCNC: 93 MG/DL (ref 65–140)
HCO3 BLDA-SCNC: 27.2 MMOL/L (ref 22–28)
HCO3 BLDA-SCNC: 29.9 MMOL/L (ref 22–28)
HCO3 BLDV-SCNC: 27.3 MMOL/L (ref 24–30)
HCT VFR BLD AUTO: 21.6 % (ref 36.5–49.3)
HCT VFR BLD AUTO: 22.6 % (ref 36.5–49.3)
HEMOCCULT STL QL: NEGATIVE
HEMOCCULT STL QL: NORMAL
HEMOCCULT STL QL: NORMAL
HGB BLD-MCNC: 6.8 G/DL (ref 12–17)
HGB BLD-MCNC: 7.1 G/DL (ref 12–17)
HYPERCHROMIA BLD QL SMEAR: PRESENT
IPAP: 16
IPAP: 16
IRON SATN MFR SERPL: 35 % (ref 20–50)
IRON SERPL-MCNC: 41 UG/DL (ref 65–175)
LYMPHOCYTES # BLD AUTO: 1.86 THOUSAND/UL (ref 0.6–4.47)
LYMPHOCYTES # BLD AUTO: 11 % (ref 14–44)
MACROCYTES BLD QL AUTO: PRESENT
MAGNESIUM SERPL-MCNC: 1.8 MG/DL (ref 1.6–2.6)
MCH RBC QN AUTO: 33.5 PG (ref 26.8–34.3)
MCHC RBC AUTO-ENTMCNC: 31.5 G/DL (ref 31.4–37.4)
MCV RBC AUTO: 106 FL (ref 82–98)
METAMYELOCYTES NFR BLD MANUAL: 1 % (ref 0–1)
MONOCYTES # BLD AUTO: 1.02 THOUSAND/UL (ref 0–1.22)
MONOCYTES NFR BLD: 6 % (ref 4–12)
MYELOCYTES NFR BLD MANUAL: 7 % (ref 0–1)
NASAL CANNULA: 6
NEUTROPHILS # BLD MANUAL: 12.7 THOUSAND/UL (ref 1.85–7.62)
NEUTS BAND NFR BLD MANUAL: 1 % (ref 0–8)
NEUTS SEG NFR BLD AUTO: 74 % (ref 43–75)
NON VENT- BIPAP: ABNORMAL
NON VENT- BIPAP: ABNORMAL
NRBC BLD AUTO-RTO: 2 /100 WBC (ref 0–2)
O2 CT BLDA-SCNC: 10.1 ML/DL (ref 16–23)
O2 CT BLDA-SCNC: 10.5 ML/DL (ref 16–23)
O2 CT BLDV-SCNC: 10.1 ML/DL
OXYHGB MFR BLDA: 86.8 % (ref 94–97)
OXYHGB MFR BLDA: 92.4 % (ref 94–97)
PCO2 BLDA: 48.1 MM HG (ref 36–44)
PCO2 BLDA: 56.5 MM HG (ref 36–44)
PCO2 BLDV: 64.7 MM HG (ref 42–50)
PEEP MAX SETTING VENT: 8 CM[H2O]
PEEP MAX SETTING VENT: 8 CM[H2O]
PH BLDA: 7.3 [PH] (ref 7.35–7.45)
PH BLDA: 7.41 [PH] (ref 7.35–7.45)
PH BLDV: 7.24 [PH] (ref 7.3–7.4)
PHOSPHATE SERPL-MCNC: 3 MG/DL (ref 2.3–4.1)
PLATELET # BLD AUTO: 93 THOUSANDS/UL (ref 149–390)
PLATELET BLD QL SMEAR: ABNORMAL
PMV BLD AUTO: 10.1 FL (ref 8.9–12.7)
PO2 BLDA: 61 MM HG (ref 75–129)
PO2 BLDA: 68.2 MM HG (ref 75–129)
PO2 BLDV: 76.4 MM HG (ref 35–45)
POLYCHROMASIA BLD QL SMEAR: PRESENT
POTASSIUM SERPL-SCNC: 3.9 MMOL/L (ref 3.5–5.3)
PROT SERPL-MCNC: 5.1 G/DL (ref 6.4–8.4)
RBC # BLD AUTO: 2.03 MILLION/UL (ref 3.88–5.62)
RBC MORPH BLD: PRESENT
RETICS # AUTO: NORMAL 10*3/UL (ref 14356–105094)
RETICS # CALC: 1.06 % (ref 0.37–1.87)
RH BLD: POSITIVE
SODIUM SERPL-SCNC: 144 MMOL/L (ref 135–147)
SPECIMEN EXPIRATION DATE: NORMAL
SPECIMEN SOURCE: ABNORMAL
SPECIMEN SOURCE: ABNORMAL
T4 FREE SERPL-MCNC: 0.59 NG/DL (ref 0.76–1.46)
TARGETS BLD QL SMEAR: PRESENT
TIBC SERPL-MCNC: 117 UG/DL (ref 250–450)
TSH SERPL DL<=0.05 MIU/L-ACNC: 5.22 UIU/ML (ref 0.45–4.5)
VENT BIPAP FIO2: 60 %
VENT BIPAP FIO2: 60 %
WBC # BLD AUTO: 16.93 THOUSAND/UL (ref 4.31–10.16)

## 2023-04-30 RX ORDER — DEXTROSE 10 % IN WATER 10 %
250 INTRAVENOUS SOLUTION INTRAVENOUS ONCE
Status: COMPLETED | OUTPATIENT
Start: 2023-04-30 | End: 2023-04-30

## 2023-04-30 RX ORDER — DEXTROSE MONOHYDRATE 25 G/50ML
25 INJECTION, SOLUTION INTRAVENOUS ONCE
Status: DISCONTINUED | OUTPATIENT
Start: 2023-04-30 | End: 2023-04-30

## 2023-04-30 RX ORDER — PREDNISONE 20 MG/1
20 TABLET ORAL DAILY
Status: DISCONTINUED | OUTPATIENT
Start: 2023-05-04 | End: 2023-05-01

## 2023-04-30 RX ORDER — PREDNISONE 20 MG/1
40 TABLET ORAL DAILY
Status: COMPLETED | OUTPATIENT
Start: 2023-04-30 | End: 2023-04-30

## 2023-04-30 RX ORDER — DEXTROSE MONOHYDRATE 25 G/50ML
50 INJECTION, SOLUTION INTRAVENOUS ONCE
Status: COMPLETED | OUTPATIENT
Start: 2023-04-30 | End: 2023-04-30

## 2023-04-30 RX ORDER — FUROSEMIDE 10 MG/ML
40 INJECTION INTRAMUSCULAR; INTRAVENOUS ONCE
Status: COMPLETED | OUTPATIENT
Start: 2023-04-30 | End: 2023-04-30

## 2023-04-30 RX ORDER — PREDNISONE 10 MG/1
10 TABLET ORAL DAILY
Status: DISCONTINUED | OUTPATIENT
Start: 2023-05-07 | End: 2023-05-01

## 2023-04-30 RX ORDER — DEXTROSE MONOHYDRATE 25 G/50ML
INJECTION, SOLUTION INTRAVENOUS
Status: DISPENSED
Start: 2023-04-30 | End: 2023-04-30

## 2023-04-30 RX ORDER — LEVOTHYROXINE SODIUM 0.05 MG/1
50 TABLET ORAL
Status: DISCONTINUED | OUTPATIENT
Start: 2023-04-30 | End: 2023-05-06 | Stop reason: HOSPADM

## 2023-04-30 RX ORDER — CALCIUM GLUCONATE 20 MG/ML
2 INJECTION, SOLUTION INTRAVENOUS ONCE
Status: COMPLETED | OUTPATIENT
Start: 2023-04-30 | End: 2023-04-30

## 2023-04-30 RX ADMIN — QUETIAPINE FUMARATE 25 MG: 25 TABLET ORAL at 00:24

## 2023-04-30 RX ADMIN — PREDNISONE 40 MG: 20 TABLET ORAL at 12:21

## 2023-04-30 RX ADMIN — CHLORHEXIDINE GLUCONATE 0.12% ORAL RINSE 15 ML: 1.2 LIQUID ORAL at 21:03

## 2023-04-30 RX ADMIN — NICOTINE 21 MG: 21 PATCH, EXTENDED RELEASE TRANSDERMAL at 08:39

## 2023-04-30 RX ADMIN — B-COMPLEX W/ C & FOLIC ACID TAB 1 TABLET: TAB at 12:21

## 2023-04-30 RX ADMIN — Medication 1 PACKET: at 12:23

## 2023-04-30 RX ADMIN — DEXTROSE MONOHYDRATE 50 ML: 25 INJECTION, SOLUTION INTRAVENOUS at 05:27

## 2023-04-30 RX ADMIN — IPRATROPIUM BROMIDE 0.5 MG: 0.5 SOLUTION RESPIRATORY (INHALATION) at 13:00

## 2023-04-30 RX ADMIN — IPRATROPIUM BROMIDE 0.5 MG: 0.5 SOLUTION RESPIRATORY (INHALATION) at 07:01

## 2023-04-30 RX ADMIN — MICONAZOLE NITRATE: 20 CREAM TOPICAL at 08:40

## 2023-04-30 RX ADMIN — LEVOTHYROXINE SODIUM 50 MCG: 50 TABLET ORAL at 12:22

## 2023-04-30 RX ADMIN — PANTOPRAZOLE SODIUM 40 MG: 40 INJECTION, POWDER, FOR SOLUTION INTRAVENOUS at 08:35

## 2023-04-30 RX ADMIN — CHLORHEXIDINE GLUCONATE 0.12% ORAL RINSE 15 ML: 1.2 LIQUID ORAL at 12:22

## 2023-04-30 RX ADMIN — THIAMINE HCL TAB 100 MG 100 MG: 100 TAB at 12:22

## 2023-04-30 RX ADMIN — CALCIUM GLUCONATE 2 G: 20 INJECTION, SOLUTION INTRAVENOUS at 08:35

## 2023-04-30 RX ADMIN — MIDODRINE HYDROCHLORIDE 10 MG: 5 TABLET ORAL at 12:21

## 2023-04-30 RX ADMIN — BUDESONIDE 0.5 MG: 0.5 INHALANT ORAL at 07:01

## 2023-04-30 RX ADMIN — BUDESONIDE 0.5 MG: 0.5 INHALANT ORAL at 19:00

## 2023-04-30 RX ADMIN — MIDODRINE HYDROCHLORIDE 10 MG: 5 TABLET ORAL at 16:29

## 2023-04-30 RX ADMIN — FOLIC ACID 1 MG: 5 INJECTION, SOLUTION INTRAMUSCULAR; INTRAVENOUS; SUBCUTANEOUS at 10:42

## 2023-04-30 RX ADMIN — DEXTROSE MONOHYDRATE 250 ML: 100 INJECTION, SOLUTION INTRAVENOUS at 00:15

## 2023-04-30 RX ADMIN — FUROSEMIDE 40 MG: 10 INJECTION, SOLUTION INTRAMUSCULAR; INTRAVENOUS at 06:29

## 2023-04-30 RX ADMIN — FORMOTEROL FUMARATE DIHYDRATE 20 MCG: 20 SOLUTION RESPIRATORY (INHALATION) at 07:01

## 2023-04-30 RX ADMIN — MICONAZOLE NITRATE: 20 CREAM TOPICAL at 17:35

## 2023-04-30 RX ADMIN — IPRATROPIUM BROMIDE 0.5 MG: 0.5 SOLUTION RESPIRATORY (INHALATION) at 19:01

## 2023-04-30 NOTE — PLAN OF CARE
Problem: MOBILITY - ADULT  Goal: Maintain or return to baseline ADL function  Description: INTERVENTIONS:  -  Assess patient's ability to carry out ADLs; assess patient's baseline for ADL function and identify physical deficits which impact ability to perform ADLs (bathing, care of mouth/teeth, toileting, grooming, dressing, etc )  - Assess/evaluate cause of self-care deficits   - Assess range of motion  - Assess patient's mobility; develop plan if impaired  - Assess patient's need for assistive devices and provide as appropriate  - Encourage maximum independence but intervene and supervise when necessary  - Involve family in performance of ADLs  - Assess for home care needs following discharge   - Consider OT consult to assist with ADL evaluation and planning for discharge  - Provide patient education as appropriate  Outcome: Progressing     Problem: Prexisting or High Potential for Compromised Skin Integrity  Goal: Skin integrity is maintained or improved  Description: INTERVENTIONS:  - Identify patients at risk for skin breakdown  - Assess and monitor skin integrity  - Assess and monitor nutrition and hydration status  - Monitor labs   - Assess for incontinence   - Turn and reposition patient  - Assist with mobility/ambulation  - Relieve pressure over bony prominences  - Avoid friction and shearing  - Provide appropriate hygiene as needed including keeping skin clean and dry  - Evaluate need for skin moisturizer/barrier cream  - Collaborate with interdisciplinary team   - Patient/family teaching  - Consider wound care consult   Outcome: Progressing     Problem: RESPIRATORY - ADULT  Goal: Achieves optimal ventilation and oxygenation  Description: INTERVENTIONS:  - Assess for changes in respiratory status  - Assess for changes in mentation and behavior  - Position to facilitate oxygenation and minimize respiratory effort  - Oxygen administered by appropriate delivery if ordered  - Initiate smoking cessation education as indicated  - Encourage broncho-pulmonary hygiene including cough, deep breathe, Incentive Spirometry  - Assess the need for suctioning and aspirate as needed  - Assess and instruct to report SOB or any respiratory difficulty  - Respiratory Therapy support as indicated  Outcome: Progressing

## 2023-04-30 NOTE — PROGRESS NOTES
Daily Progress Note - Critical Care   Leann Case 63 y o  male MRN: [id number]  Unit/Bed#: MICU 05 Encounter: [id number]        ----------------------------------------------------------------------------------------  HPI    63year old male PMH of alcohol abuse with prior DTs, end-stage COPD, Crohn's disease s/p ileostomy and reversal in 2018, CAD with MI in 2012 with stent in RCA presented to [facility] ED on 4/25 with complaint of shortness of breath for several days  He was hypotensive, tachycardic, and tachypneic with an elevated lactate of 20  Patient required pressor support and BiPAP, subsequently transferred to ICU for further management  24hr events:   CIWA score of 14 ovn, given 1mg of Ativan and 25mg od Seroquel  Sugars dropped overnight, given D10 at 0015 and D50 at 0600  BiPAP placed this morning for increased WOB and desaturations to 85% consistently  CXR obtained, IV lasix 40mg given, VBG drawn       ---------------------------------------------------------------------------------------  SUBJECTIVE  Denies any chest pain, abdominal pain, nausea  Review of Systems   Unable to perform ROS: Other   Respiratory: Positive for shortness of breath  Review of systems was reviewed and negative unless stated above in HPI/24-hour events   ---------------------------------------------------------------------------------------  Assessment and Plan:    Neuro:   • Diagnosis: Alcohol abuse   o Ethanol 4/25: 45  o Ethylene glycol and methanol 4/25: negative   o UDS 4/25: negative   o Last drink 4/25 prior to EMS arrival  o Phenobarbital  - 4/25: 158 mg  - 4/26: 230/650/130/130   - 4/27: 130/260   - 4/28: 130   o vEEG 4/27: mild diffuse cerebral dysfunction of nonspecific etiology  No electrographic seizures or interictal epileptiform discharges were seen     o Plan:   - [facility] protocol  - Thiamine, folate   - Fomepizole started 4/25, discontinued 4/26   - Monitor for alcohol withdrawal   - Toxicology consulted - total phenobarbital dosing recommended max of 2 to 2 5g  If reaching 2g and need additional treatment, adjunctive measures could include precedex and ketamine, as needed benzodiazepines  • Diagnosis: Delirium ppx   o Plan:   - CAM ICU  Sleep-wake regulation  CV:   • Diagnosis: Undifferentiated shock  o Suspect septic shock   o Received 30 cc/kg IVF bolus  o TTE 7/10/3244: systolic function mildly reduced to low normal  EF 52%  o Plan:   - maintain MAP >65  • Diagnosis: CAD with MI in 2012 s/p RCA stent   o Home atorvastatin 40mg daily   o Plan:   - Continue home statin       Pulm:  • Diagnosis: Acute hypoxic respiratory failure   o Pulmonary edema vs worsening COPD   o BNP 3297 (330 in 2016)   o CT CAP 4/25: moderate centrilobar emphysema  A 2 mm right middle lobe pulmonary nodule (4/147) and a 3 mm right lower lobe solid pulm nodule (4/193), stable since 5/15/2018, therefore consistent with a benign etiology  No new or enlarging pulmonary nodules  o Intubated 4/26 for airway protection and worsening hypercapnia   o Sputum Cx 4/27: pending   o Procalcitonin 4/28: 1 90  o Extubated 4/28, BiPAP PRN  o Plan:   - Sputum culture: 1+ Growth of - Presumptive Candida albicans and mixed respiratory daniel  • Diagnosis: End-stage COPD   o Home albuterol and ipratropium   o Plan:   - Continue atrovent/xopenex TID   - Prednisone 40mg daily      GI:   • Diagnosis: Abnormal liver function   o , ALT 69, Alk phos 239, T bili 2 36  o CT CAP 4/25: hepatomegaly and severe hepatic steatosis   o Chronic hepatitis panel 4/25: negative   o RUQ U/S 4/27: hepatomegaly with severe hepatic steatosis  Visualized portions of pancreas are normal  Minimal cholelithiasis  o Plan:   - Trend LFTs  • Diagnosis: Crohn's disease   ? H/o transverse colon perforation s/p ileostomy and reversal in 2018  ? Currently does not take any medications for Crohns   ? CT CAP 4/25: no bowel obstruction  Underdistended descending colon  Postsurgical changes of partial right hemicolectomy  • Diagnosis: Bloody NGT output  ? Patient evaluated by acute care surgery in the ED, NGT was inserted due to concern for SBO vs ischemic bowel, ~100 cc of bloody output seen in cannula  ? CT CAP 4/25: circumferential mural thickening of esophagus with an air-fluid level, may be related to esophagitis and esophageal dysmotility  o Plan:   - Monitor NGT output and trend H&H   • Diagnosis: Elevated lipase and amylase   o Possible chronic alcoholic pancreatitis   o Lipase 4/25: 2090 --> 595 on 4/26  Amylase 4/25: 155  o RUQ U/S 4/27: visualized portions of the pancreas are normal        :   • Diagnosis: DEBBY - resolved   o Unclear etiology at this time   o Cr 1 10, down from 2 87 on presentation  Baseline 0 9-1 2   o Plan:   - Hernandez in place, strict I/Os   - Trend BUN/Cr   • Diagnosis: High anion gap metabolic acidosis (resolved)   o Secondary to lactic acidosis   o VBG 4/25: pH 7 07, pCO2 39 5, HCO3 11 2   o ABG 4/25: pH 7 286, pCO2 38 9, HCO3 18 1    o VBG 4/30: pending       F/E/N:   • Fluids: no mIVF   • Replete electrolytes as needed   • Nutrition: NPO  SLP to evaluate, advance diet as tolerated        Heme/Onc:   • Diagnosis: Macrocytic anemia   o Low folate 2 3   o Vitamin B12 wnl (682)  o Plan:   - Folate 1000 mcg daily   - Trend H&H and monitor for s/s bleeding   • Diagnosis: DVT ppx   o Plan: SCDs       Endo:   • Diagnosis: Hyperglycemia   o A1c 4/25: 8  o Plan:  - SSI       ID:   • Diagnosis: Septic shock   o No clear source at this time   o U/A microscopic 4/25: small leukocytes, negative nitrite, no bacteria, 30-50 WBC    o Urine cx 4/25: mixed contaminants   o Blood cx 4/25: preliminary result demonstrates no growth at 72 hrs    o MRSA 4/25: positive   o Sputum culture: 1+ Growth of - Presumptive Candida albicans Abnormal   o Plan:   - Continue cefepime/flagyl/vanc, de-escalate once final blood cx results       MSK/Skin:   • Diagnosis: DTI ppx o Plan: Frequent repositioning  PT/OT and OOB as tolerated       Disposition: Continue Critical Care   Code Status: Level 1 - Full Code  ---------------------------------------------------------------------------------------  ICU CORE MEASURES    Prophylaxis   VTE Pharmacologic Prophylaxis: N/A  VTE Mechanical Prophylaxis: sequential compression device  Stress Ulcer Prophylaxis: Prophylaxis Not Indicated     ABCDE Protocol (if indicated)  Plan to perform spontaneous awakening trial today? Yes  Plan to perform spontaneous breathing trial today? Yes  Obvious barriers to extubation? Not applicable  CAM-ICU: Negative    Invasive Devices Review  Invasive Devices     Peripheral Intravenous Line  Duration           Peripheral IV 23 Right;Ventral (anterior) Forearm 2 days    Peripheral IV 23 Distal;Left;Upper;Ventral (anterior) Arm <1 day          Drain  Duration           External Urinary Catheter Small <1 day    Rectal Tube <1 day              Can any invasive devices be discontinued today? No  ---------------------------------------------------------------------------------------  OBJECTIVE    Vitals   Vitals:    23 0000 23 0200 23 0400 23 0619   BP: 116/56 (!) 103/46 99/55    BP Location: Right leg  Right leg    Pulse: (!) 124 (!) 120 (!) 122    Resp: (!)  22 22    Temp:   99 °F (37 2 °C)    TempSrc:   Axillary    SpO2: 91% 90% 95% 100%   Weight:       Height:         Temp (24hrs), Av 5 °F (36 9 °C), Min:98 °F (36 7 °C), Max:99 3 °F (37 4 °C)  Current: Temperature: 99 °F (37 2 °C)    Respiratory:  SpO2: SpO2: 100 %    Invasive/non-invasive ventilation settings   Respiratory    Lab Data (Last 4 hours)    None         O2/Vent Data (Last 4 hours)       0619          Non-Invasive Ventilation Mode BiPAP                   Physical Exam  Vitals and nursing note reviewed  Constitutional:       General: He is not in acute distress  Appearance: He is not ill-appearing  HENT:      Head: Normocephalic and atraumatic  Cardiovascular:      Rate and Rhythm: Normal rate and regular rhythm  Pulses: Normal pulses  Pulmonary:      Effort: Pulmonary effort is normal  No respiratory distress  Breath sounds: Wheezing present  Comments: BiPAP in place  Abdominal:      Palpations: Abdomen is soft  Tenderness: There is no abdominal tenderness  There is no guarding  Hernia: A hernia is present  Genitourinary:     Comments: Hernandez in place   Skin:     General: Skin is warm and dry  Capillary Refill: Capillary refill takes less than 2 seconds  Neurological:      Mental Status: He is alert  Comments: Oriented to self and able to follow simple commands              Laboratory and Diagnostics:  Results from last 7 days   Lab Units 04/30/23  0455 04/29/23  0508 04/28/23  0443 04/27/23  1615 04/27/23  0617 04/26/23  1355 04/26/23  0429 04/25/23  1346 04/25/23  1045   WBC Thousand/uL 16 93* 19 34* 15 14*  --  8 81  --  10 52*  --  20 57*   HEMOGLOBIN g/dL 6 8* 7 3* 7 4* 7 3* 7 2* 8 0* 8 2*   < > 12 1   HEMATOCRIT % 21 6* 22 3* 22 0* 21 8* 21 3* 23 4* 24 3*   < > 37 7   PLATELETS Thousands/uL 93* 74* 98*  --  83*  --  74*  --  155   NEUTROS PCT %  --   --   --   --  91*  --  86*  --  85*   MONOS PCT %  --   --   --   --  3*  --  7  --  7    < > = values in this interval not displayed       Results from last 7 days   Lab Units 04/30/23  0455 04/29/23  1814 04/29/23  4677 04/28/23  1724 04/28/23  0447 04/28/23  0443 04/28/23  0007 04/27/23  1615 04/27/23  0452 04/27/23  0450 04/26/23  1355 04/26/23  0850 04/25/23  1801 04/25/23  1704 04/25/23  1045   SODIUM mmol/L 144 143 143 138 136  --  134* 132*   < > 130*   < > 126*   < > 133* 125*   POTASSIUM mmol/L 3 9 4 7 4 0 4 7 3 9  --  3 7 3 7   < > 3 8   < > 4 1   < > 3 8 4 7   CHLORIDE mmol/L 112* 112* 116* 111* 108  --  104 104   < > 99   < > 95*   < > 106 86*   CO2 mmol/L 29 27 24 22 24  --  25 24   < > 21   < > 23 < > 11* 11*   ANION GAP mmol/L 3* 4 3* 5 4  --  5 4   < > 10   < > 8   < > 16* 28*   BUN mg/dL 15 19 23 26* 26*  --  29* 29*   < > 33*   < > 33*   < > 27* 33*   CREATININE mg/dL 1 10 1 32* 1 34* 1 57* 1 75*  --  1 76* 2 01*   < > 2 09*   < > 2 44*   < > 2 03* 2 87*   CALCIUM mg/dL 7 1* 7 6* 7 2* 7 3* 7 2*  --  6 7* 6 8*   < > 7 7*   < > 7 5*   < > 5 4* 8 5   GLUCOSE RANDOM mg/dL 66 111 93 145* 139  --  172* 260*   < > 203*   < > 149*   < > 118 226*   ALT U/L 69  --  57  --   --  46  --   --   --  53  --  54  --  47 66   AST U/L 133*  --  89*  --   --  89*  --   --   --  134*  --  140*  --  147* 204*   ALK PHOS U/L 238*  --  209*  --   --  169*  --   --   --  158*  --  168*  --  168* 263*   ALBUMIN g/dL 2 6*  --  1 8*  --   --  1 7*  --   --   --  1 7*  --  1 9*  --  1 5* 2 1*   TOTAL BILIRUBIN mg/dL 2 36*  --  1 73*  --   --  1 53*  --   --   --  1 19*  --  1 38*  --  1 28* 1 10*    < > = values in this interval not displayed       Results from last 7 days   Lab Units 04/30/23  0455 04/29/23  1814 04/29/23  0508 04/28/23  1724 04/28/23  0514 04/28/23  0007 04/27/23  1615   MAGNESIUM mg/dL 1 8 2 0 2 2 2 5 2 9* 1 9 2 2   PHOSPHORUS mg/dL 3 0 4 1 1 7* 3 2 2 1* 3 4 1 8*      Results from last 7 days   Lab Units 04/28/23  0443 04/27/23  0557 04/26/23  0429 04/25/23  1045   INR  1 32* 1 35* 1 30* 1 21*   PTT seconds  --   --   --  28          Results from last 7 days   Lab Units 04/26/23  0850 04/26/23  0543 04/26/23  0327 04/26/23  0010 04/25/23  1704 04/25/23  1627 04/25/23  1430   LACTIC ACID mmol/L 2 6* 4 1* 5 9* 6 6* 10 2* 11 7* 12 9*     ABG:  Results from last 7 days   Lab Units 04/26/23  1856 04/25/23  1802   PH ART  7 326* 7 286*   PCO2 ART mm Hg 50 7* 38 9   PO2 ART mm Hg 163 2* 296 1*   HCO3 ART mmol/L 25 9 18 1*   BASE EXC ART mmol/L -0 3 -7 9   ABG SOURCE   --  Line, Arterial     VBG:  Results from last 7 days   Lab Units 04/28/23  1443 04/26/23  1701 04/25/23  1802   PH SANAZ  7 327   < >  --    PCO2 SANAZ mm Hg "45 4   < >  --    PO2 SANAZ mm Hg 35 7   < >  --    HCO3 SANAZ mmol/L 23 2*   < >  --    BASE EXC SANAZ mmol/L -2 7   < >  --    ABG SOURCE   --   --  Line, Arterial    < > = values in this interval not displayed  Results from last 7 days   Lab Units 04/28/23  0443 04/25/23  1045   PROCALCITONIN ng/ml 1 90* 1 73*       Micro  Results from last 7 days   Lab Units 04/27/23  1726 04/26/23  1228 04/25/23  1713 04/25/23  1607 04/25/23  1112 04/25/23  1045   BLOOD CULTURE   --   --   --   --  No Growth After 4 Days  No Growth After 4 Days  SPUTUM CULTURE  1+ Growth of - Presumptive Candida albicans*  Few Colonies of  --   --   --   --   --    GRAM STAIN RESULT  Rare Epithelial Cells  No Polys or Bacteria seen  --   --   --   --   --    URINE CULTURE   --   --   --  80,000-89,000 cfu/ml  --   --    MRSA CULTURE ONLY   --   --  Methicillin Resistant Staphylococcus aureus isolated*  --   --   --    C DIFF TOXIN B BY PCR   --  Negative  --   --   --   --        Imaging: I have personally reviewed pertinent reports  Intake and Output  I/O       04/24 0701  04/25 0700 04/25 0701 04/26 0700    I V  (mL/kg)  1837 8 (23 9)    NG/GT  0    IV Piggyback  2950    Total Intake(mL/kg)  4787 8 (62 2)    Urine (mL/kg/hr)  310    Emesis/NG output  2000    Stool  600    Total Output  2910    Net  +1877 8                Height and Weights   Height: 5' 6\" (167 6 cm)  IBW (Ideal Body Weight): 63 8 kg  Body mass index is 28 72 kg/m²  Weight (last 2 days)     Date/Time Weight    04/29/23 0553 80 7 (177 91)    04/28/23 0543 71 8 (158 29)            Nutrition       Diet Orders   (From admission, onward)             Start     Ordered    04/29/23 0926  Diet Regular; Regular House; Honey Thick Liquid, Dysphagia 1-Pureed;  Honey Thick Liquid  Diet effective now        References:    Nutrtion Support Algorithm Enteral vs  Parenteral   Question Answer Comment   Diet Type Regular    Regular Regular House    Other Restriction(s): Honey Thick " "Liquid    Other Restriction(s): Dysphagia 1-Pureed    Liquid Modifier Honey Thick Liquid    RD to adjust diet per protocol? Yes        04/29/23 5663                  Active Medications  Scheduled Meds:  Current Facility-Administered Medications   Medication Dose Route Frequency Provider Last Rate   • albuterol  2 5 mg Nebulization Q4H PRN Taryn Franco MD     • budesonide  0 5 mg Nebulization Q12H Juno Carlos MD     • chlorhexidine  15 mL Mouth/Throat Q12H [address] Juno Carlos MD     • dextrose          • folic acid  0,123 mcg Oral Daily Juno Carlos MD     • formoterol  20 mcg Nebulization Q12H Juno Carlos MD     • insulin lispro  2-12 Units Subcutaneous Q6H [address] Herrera DO     • ipratropium  0 5 mg Nebulization TID Taryn Franco MD     • midodrine  10 mg Oral TID AC Fady Cohn MD     • ANSHU ANTIFUNGAL   Topical BID AMALIA Bro     • multivitamin stress formula  1 tablet Oral Daily Juno Carlos MD     • nicotine  21 mg Transdermal Daily Gerhard Edmond MD     • pantoprazole  40 mg Intravenous Q24H [address] Fady Cohn MD     • predniSONE  40 mg Oral Daily Fady Cohn MD     • psyllium  1 packet Oral Daily Juno Carlos MD     • thiamine  100 mg Oral Daily Fady Cohn MD       Continuous Infusions:     PRN Meds:   albuterol, 2 5 mg, Q4H PRN        Allergies   Allergies   Allergen Reactions   • Medical Tape    • Other      Brown cloth band aids      • Latex Rash     ---------------------------------------------------------------------------------------  Advance Directive and Living Will:      Power of :    POLST:    ---------------------------------------------------------------------------------------    Sayra Mixon,       Portions of the record may have been created with voice recognition software  Occasional wrong word or \"sound a like\" substitutions may have occurred due to the inherent limitations of voice recognition software    Read the chart carefully and recognize, using " context, where substitutions have occurred

## 2023-04-30 NOTE — ASSESSMENT & PLAN NOTE
Since extubation, patient has been AAOx1 only  Able to state his name, but is unable to state location, date, situation  Daughter reports he also was unable to identify her when she visited on 4/28  His baseline is AAOx4 without cognitive deficit  Etiology unclear at this time, possibly 2/2 withdrawal vs  Wernicke's vs  Metabolic abnormalities vs  anoxic injury in setting of hypotension  [facility] on presentation negative  Infectious workup has also been negative  vEEG negative      Plan:  -Continue to monitor at this time with recent D/C of many CNS altering medications  -Consider neuro consult with lack of improvement  -Also to consider MRI for further evaluation of underlying cerebral damage  -Delirium precautions  -Promote sleep/wake cycle  -Avoid metabolic derangements  -Continue thiamine and folate supplementation - consider IV high dose thiamine with lack of improvement in mental status  -Currently oriented to self and place

## 2023-05-01 ENCOUNTER — APPOINTMENT (INPATIENT)
Dept: RADIOLOGY | Facility: HOSPITAL | Age: 63
End: 2023-05-01

## 2023-05-01 PROBLEM — N17.9 AKI (ACUTE KIDNEY INJURY) (HCC): Status: RESOLVED | Noted: 2018-07-08 | Resolved: 2023-05-01

## 2023-05-01 LAB
2HR DELTA HS TROPONIN: -4 NG/L
4HR DELTA HS TROPONIN: -7 NG/L
ALBUMIN SERPL BCP-MCNC: 2.4 G/DL (ref 3.5–5)
ALP SERPL-CCNC: 272 U/L (ref 46–116)
ALT SERPL W P-5'-P-CCNC: 72 U/L (ref 12–78)
ANION GAP SERPL CALCULATED.3IONS-SCNC: 4 MMOL/L (ref 4–13)
AST SERPL W P-5'-P-CCNC: 105 U/L (ref 5–45)
ATRIAL RATE: 115 BPM
BASE EX.OXY STD BLDV CALC-SCNC: 95.5 % (ref 60–80)
BASE EXCESS BLDV CALC-SCNC: 4.7 MMOL/L
BILIRUB SERPL-MCNC: 2.32 MG/DL (ref 0.2–1)
BUN SERPL-MCNC: 14 MG/DL (ref 5–25)
CA-I BLD-SCNC: 1.07 MMOL/L (ref 1.12–1.32)
CALCIUM ALBUM COR SERPL-MCNC: 9.4 MG/DL (ref 8.3–10.1)
CALCIUM SERPL-MCNC: 8.1 MG/DL (ref 8.3–10.1)
CARDIAC TROPONIN I PNL SERPL HS: 31 NG/L
CARDIAC TROPONIN I PNL SERPL HS: 34 NG/L
CARDIAC TROPONIN I PNL SERPL HS: 38 NG/L
CHLORIDE SERPL-SCNC: 109 MMOL/L (ref 96–108)
CO2 SERPL-SCNC: 30 MMOL/L (ref 21–32)
CREAT SERPL-MCNC: 1.09 MG/DL (ref 0.6–1.3)
ERYTHROCYTE [DISTWIDTH] IN BLOOD BY AUTOMATED COUNT: 16.1 % (ref 11.6–15.1)
FLUAV RNA RESP QL NAA+PROBE: NEGATIVE
FLUBV RNA RESP QL NAA+PROBE: NEGATIVE
GFR SERPL CREATININE-BSD FRML MDRD: 73 ML/MIN/1.73SQ M
GLUCOSE SERPL-MCNC: 111 MG/DL (ref 65–140)
GLUCOSE SERPL-MCNC: 135 MG/DL (ref 65–140)
GLUCOSE SERPL-MCNC: 146 MG/DL (ref 65–140)
GLUCOSE SERPL-MCNC: 254 MG/DL (ref 65–140)
GLUCOSE SERPL-MCNC: 68 MG/DL (ref 65–140)
GLUCOSE SERPL-MCNC: 96 MG/DL (ref 65–140)
HCO3 BLDV-SCNC: 28.3 MMOL/L (ref 24–30)
HCT VFR BLD AUTO: 27.5 % (ref 36.5–49.3)
HGB BLD-MCNC: 8.8 G/DL (ref 12–17)
LACTATE SERPL-SCNC: 1.4 MMOL/L (ref 0.5–2)
MAGNESIUM SERPL-MCNC: 2 MG/DL (ref 1.6–2.6)
MCH RBC QN AUTO: 33.7 PG (ref 26.8–34.3)
MCHC RBC AUTO-ENTMCNC: 32 G/DL (ref 31.4–37.4)
MCV RBC AUTO: 105 FL (ref 82–98)
O2 CT BLDV-SCNC: 11.9 ML/DL
PCO2 BLDV: 38 MM HG (ref 42–50)
PH BLDV: 7.49 [PH] (ref 7.3–7.4)
PHOSPHATE SERPL-MCNC: 2 MG/DL (ref 2.3–4.1)
PLATELET # BLD AUTO: 141 THOUSANDS/UL (ref 149–390)
PMV BLD AUTO: 10 FL (ref 8.9–12.7)
PO2 BLDV: 139.2 MM HG (ref 35–45)
POTASSIUM SERPL-SCNC: 3.9 MMOL/L (ref 3.5–5.3)
PR INTERVAL: 126 MS
PROCALCITONIN SERPL-MCNC: 1.04 NG/ML
PROT SERPL-MCNC: 5.3 G/DL (ref 6.4–8.4)
QRS AXIS: 261 DEGREES
QRSD INTERVAL: 94 MS
QT INTERVAL: 348 MS
QTC INTERVAL: 481 MS
RBC # BLD AUTO: 2.61 MILLION/UL (ref 3.88–5.62)
RSV RNA RESP QL NAA+PROBE: NEGATIVE
SARS-COV-2 RNA RESP QL NAA+PROBE: NEGATIVE
SODIUM SERPL-SCNC: 143 MMOL/L (ref 135–147)
T WAVE AXIS: 262 DEGREES
VENTRICULAR RATE: 115 BPM
WBC # BLD AUTO: 18.61 THOUSAND/UL (ref 4.31–10.16)

## 2023-05-01 RX ORDER — ALBUMIN (HUMAN) 12.5 G/50ML
25 SOLUTION INTRAVENOUS ONCE
Status: COMPLETED | OUTPATIENT
Start: 2023-05-01 | End: 2023-05-01

## 2023-05-01 RX ORDER — FUROSEMIDE 10 MG/ML
40 INJECTION INTRAMUSCULAR; INTRAVENOUS ONCE
Status: DISCONTINUED | OUTPATIENT
Start: 2023-05-01 | End: 2023-05-01

## 2023-05-01 RX ORDER — INSULIN LISPRO 100 [IU]/ML
2-12 INJECTION, SOLUTION INTRAVENOUS; SUBCUTANEOUS
Status: DISCONTINUED | OUTPATIENT
Start: 2023-05-01 | End: 2023-05-06 | Stop reason: HOSPADM

## 2023-05-01 RX ORDER — ENOXAPARIN SODIUM 100 MG/ML
40 INJECTION SUBCUTANEOUS EVERY 12 HOURS SCHEDULED
Status: DISCONTINUED | OUTPATIENT
Start: 2023-05-01 | End: 2023-05-01

## 2023-05-01 RX ORDER — FUROSEMIDE 10 MG/ML
20 INJECTION INTRAMUSCULAR; INTRAVENOUS ONCE
Status: DISCONTINUED | OUTPATIENT
Start: 2023-05-01 | End: 2023-05-01

## 2023-05-01 RX ORDER — ENOXAPARIN SODIUM 100 MG/ML
40 INJECTION SUBCUTANEOUS DAILY
Status: DISCONTINUED | OUTPATIENT
Start: 2023-05-01 | End: 2023-05-06 | Stop reason: HOSPADM

## 2023-05-01 RX ORDER — PREDNISONE 20 MG/1
40 TABLET ORAL DAILY
Status: DISCONTINUED | OUTPATIENT
Start: 2023-05-02 | End: 2023-05-06

## 2023-05-01 RX ADMIN — FORMOTEROL FUMARATE DIHYDRATE 20 MCG: 20 SOLUTION RESPIRATORY (INHALATION) at 19:37

## 2023-05-01 RX ADMIN — THIAMINE HCL TAB 100 MG 100 MG: 100 TAB at 11:02

## 2023-05-01 RX ADMIN — IPRATROPIUM BROMIDE 0.5 MG: 0.5 SOLUTION RESPIRATORY (INHALATION) at 07:38

## 2023-05-01 RX ADMIN — FORMOTEROL FUMARATE DIHYDRATE 20 MCG: 20 SOLUTION RESPIRATORY (INHALATION) at 07:38

## 2023-05-01 RX ADMIN — PREDNISONE 30 MG: 20 TABLET ORAL at 11:02

## 2023-05-01 RX ADMIN — FOLIC ACID 1 MG: 5 INJECTION, SOLUTION INTRAMUSCULAR; INTRAVENOUS; SUBCUTANEOUS at 12:04

## 2023-05-01 RX ADMIN — INSULIN LISPRO 6 UNITS: 100 INJECTION, SOLUTION INTRAVENOUS; SUBCUTANEOUS at 17:13

## 2023-05-01 RX ADMIN — MICONAZOLE NITRATE: 20 CREAM TOPICAL at 11:03

## 2023-05-01 RX ADMIN — ALBUMIN (HUMAN) 25 G: 0.25 INJECTION, SOLUTION INTRAVENOUS at 11:24

## 2023-05-01 RX ADMIN — BUDESONIDE 0.5 MG: 0.5 INHALANT ORAL at 07:38

## 2023-05-01 RX ADMIN — Medication 1 PACKET: at 11:02

## 2023-05-01 RX ADMIN — MIDODRINE HYDROCHLORIDE 10 MG: 5 TABLET ORAL at 15:19

## 2023-05-01 RX ADMIN — LEVOTHYROXINE SODIUM 50 MCG: 50 TABLET ORAL at 11:02

## 2023-05-01 RX ADMIN — NICOTINE 21 MG: 21 PATCH, EXTENDED RELEASE TRANSDERMAL at 11:05

## 2023-05-01 RX ADMIN — PANTOPRAZOLE SODIUM 40 MG: 40 INJECTION, POWDER, FOR SOLUTION INTRAVENOUS at 11:21

## 2023-05-01 RX ADMIN — MIDODRINE HYDROCHLORIDE 10 MG: 5 TABLET ORAL at 11:02

## 2023-05-01 RX ADMIN — IPRATROPIUM BROMIDE 0.5 MG: 0.5 SOLUTION RESPIRATORY (INHALATION) at 19:36

## 2023-05-01 RX ADMIN — IPRATROPIUM BROMIDE 0.5 MG: 0.5 SOLUTION RESPIRATORY (INHALATION) at 13:54

## 2023-05-01 RX ADMIN — ENOXAPARIN SODIUM 40 MG: 40 INJECTION SUBCUTANEOUS at 15:18

## 2023-05-01 RX ADMIN — BUDESONIDE 0.5 MG: 0.5 INHALANT ORAL at 19:36

## 2023-05-01 RX ADMIN — MICONAZOLE NITRATE: 20 CREAM TOPICAL at 17:14

## 2023-05-01 RX ADMIN — MIDODRINE HYDROCHLORIDE 10 MG: 5 TABLET ORAL at 06:23

## 2023-05-01 NOTE — PROGRESS NOTES
INTERNAL MEDICINE RESIDENCY PROGRESS NOTE     Name: Billy Manuel   Age & Sex: 63 y o  male   MRN: [id number]  Unit/Bed#: [facility] 901-01   Encounter: [id number]  Team: SOD Team A    PATIENT INFORMATION     Name: Billy Manuel   Age & Sex: 63 y o  male   MRN: [id number]  Hospital Stay Days: 6    ASSESSMENT/PLAN     Principal Problem:    Encephalopathy  Active Problems:    Acute respiratory failure with hypoxia ([facility] )    Crohn disease ([place] [address] )    Coronary artery disease involving native coronary artery of native heart without angina pectoris    Alcohol abuse    Cigarette nicotine dependence without complication    Macrocytic anemia    End stage COPD ([facility] )    Prediabetes    Abnormal liver function      Acute respiratory failure with hypoxia [facility])  Assessment & Plan  Patient presented with acute hypoxia and increased work of breathing  Required continuous bipap and ultimately intubated for airway protection in the setting of treating alcohol withdrawal  Sputum culture growing candida, however, per ICU sign out likely contaminant and no need to treat  Extubated on 4/28 to nasal cannula and has been titrated down to 2L  Patient's daughter reports that patient does have O2 at home, but he never uses it  Stage IV COPD based on outpatient documentation    Plan:  -This AM patient in increased respiratory distress, accessory muscle use on 6L NC- back on BIPAP  -Off Bipap, now on 10L midflow  -Ordered troponin, ECG, lactic, Procal   -CXR 5/1 awaiting read    -Etiology unclear - possibly 2/2 metabolic derangements in setting of alcohol ketoacidosis vs  COPD vs  Underlying infection  -Completed 3 doses of IV Solu-Medrol and transition to oral prednisone on 4/28 -we will plan to complete 5-day course and then discontinue   -Prednisone 30 mg today   -Completed 5 days of Cefepime and Vancomycin   -Continue to titrate O2 to maintain SpO2 >88%  -Continue atrovent, performist, and pulmicort  -PRN albuterol    Abnormal liver function  Assessment & Plan  Patient presented with , ALT 66, alk phos 263, and T  bili 1 10 with unclear baseline as patient without recent CMP to evaluate  CT of the chest abdomen pelvis completed on 4/25 significant for hepatomegaly and severe hepatic steatosis  Chronic hepatitis panel completed was negative  Right upper quadrant ultrasound done on 4/27 demonstrated hepatomegaly with severe hepatic steatosis without concern for cholecystitis or signs of cirrhosis  Plan:  -Currently AST/ALT have plateaued to improving   -T  bili 2 36 -> 2 32 today   -Possibly in setting of ongoing drinking with appropriate ratio  -Continue to monitor daily CMP/INR      Prediabetes  Assessment & Plan  A1c 6 2 on presentation  Plan:   -Continue SSI with POC glucose checks  -Will need outpatient evaluation by PCP    End stage COPD [facility])  Assessment & Plan  Patient maintained in the outpatient setting on albuterol and ipratropium    Plan:  -Continue Atrovent, Pulmicort, and Performist  -Prednisone taper stopped due to respiratory distress   -Now on Prednisone 40 mg daily   -Continue to titrate supplemental O2 to maintain saturation greater than 88%    Macrocytic anemia  Assessment & Plan  Hemoglobin on presentation 12 1 with unclear baseline  Previous labs done in 2018 demonstrate hemoglobin close to 8  With adequate fluid resuscitation patient's hemoglobin dropped to 7-8, which is likely closer to his baseline    B12 within normal limits  Folate significantly low at 2 3  No S/S of active bleed at this time  Plan:  -HGB 7 1 -> 8 8 today   -continue daily folate supplementation  -monitor daily CBC  -Transfuse for Hgb <7 0    Cigarette nicotine dependence without complication  Assessment & Plan  Plan:  -Continue nicotine patch    Alcohol abuse  Assessment & Plan  Patient with significant history of alcohol use   Daughter reports drinks from the time he wakes up to the time he sleeps, possibly consuming 8 glasses of apple kenji daily  Ethanol on presentation 45 with negative UDS and ehtylene glycol  Last drink on 4/25 prior to arrival to hospital  Patient managed in ICU with versed, precedex, and propofol and ultimately treated with phenobarbital (4/25: 150 mg, 4/26: 230/650/130/130, 4/27: 130/260, 4/28: 130), which has now been discontinued  With continued encephalopathy, patient underwent vEEG demonstrating mild diffuse cerebral dysfunction of nonspecific etiology  No electrographic seizures or interictal epileptiform discharges were seen      Plan:   -Phenobarb d/c by ICU team  -[facility] protocol ordered  -Thiamine and folate ordered  -Monitor for S/S of withdrawal  -S/P IV Thiamine  Coronary artery disease involving native coronary artery of native heart without angina pectoris  Assessment & Plan  -Continue aspirin 81mg and atorvastatin 40mg daily    Crohn disease ([facility] )  Assessment & Plan  Patient with history of Crohn disease  complicated by transverse colon perforation requiring resection and ileostomy with reversal in 2018  Currently prescribed sulfasalazine in the outpatient setting, however, daughter reports patient not taking his medications at this time    Plan:  -Continue to monitor  -Will need outpatient GI versus colorectal surgery follow-up    * Encephalopathy  Assessment & Plan  Since extubation, patient has been AAOx1 only  Able to state his name, but is unable to state location, date, situation  Daughter reports he also was unable to identify her when she visited on 4/28  His baseline is AAOx4 without cognitive deficit  Etiology unclear at this time, possibly 2/2 withdrawal vs  Wernicke's vs  Metabolic abnormalities vs  anoxic injury in setting of hypotension  [facility] on presentation negative  Infectious workup has also been negative  vEEG negative      Plan:  -Continue to monitor at this time with recent D/C of many CNS altering medications  -Consider neuro consult with lack of improvement  -Also to consider MRI for further evaluation of underlying cerebral damage  -Delirium precautions  -Promote sleep/wake cycle  -Avoid metabolic derangements  -Continue thiamine and folate supplementation - consider IV high dose thiamine with lack of improvement in mental status  -Currently oriented to self and place    Hyperammonemia (HCC)-resolved as of 4/29/2023  Assessment & Plan  Initial ammonia 82  Patient without history of cirrhosis  Was treated with lactulose in the ICU, which has been discontinued  No need to further trend ammonia  Shock (HCC)-resolved as of 4/29/2023  Assessment & Plan  Unclear source of shock - septic vs  Hypovolemic in setting of diarrhea  Received sepsis bolus  Was supported on levophed and vasopressin, which were discontinued today  Patient treated with 5 days of cefepime and vancomycin  ID consulted and recommended discontinuing antibiotics without clear sign/source of infection  DEBBY (acute kidney injury) (HCC)-resolved as of 5/1/2023  Assessment & Plan  Likely in setting of hypovolemia/prerenal in setting of alcohol ketoacidosis  Presented with Cr 2 87 that has improved to 1 34 today  Unclear baseline    Plan:  -Cr 1 1 -> 1 09  -Continue to monitor daily BMP  -Avoid nephrotoxic agents    High anion gap metabolic acidosis-resolved as of 4/29/2023  Assessment & Plan  Likely secondary to lactic acidosis      Disposition: Patient continued to be monitored for respiratory distress  Took off of BIPAP and now on 10L midflow  Etiology of acute respiratory failure still unclear  Awaiting ECG and troponin labs  Patient currently has elevated WBC, being tapered off of prednisone  SUBJECTIVE     Patient seen and examined  No acute events overnight  Limited exam due to BIPAP and respiratory distress  Reported no chest pain  Visibly frustrated  ROS otherwise negative outside of respiratory distress but limited cooperation       OBJECTIVE     Vitals:    05/01/23 0838 05/01/23 1048 05/01/23 1050 23 1057   BP: 95/67 (!) 87/65 97/64    Pulse: (!) 114 (!) 119 (!) 119    Resp: 22      Temp: 99 7 °F (37 6 °C)      TempSrc: Axillary      SpO2: 92% (!) 83% 90% 91%   Weight:       Height:          Temperature:   Temp (24hrs), Av 2 °F (36 8 °C), Min:97 8 °F (36 6 °C), Max:99 7 °F (37 6 °C)    Temperature: 99 7 °F (37 6 °C)  Intake & Output:  I/O        0701   0700  0701   0700  0701   0700    P  O  300 580     I V  (mL/kg) 42 7 (0 5) 30 (0 4)     IV Piggyback 1550 150     Feedings       Total Intake(mL/kg) 1892 7 (24 3) 760 (9 5)     Urine (mL/kg/hr) 2678 (1 4) 2170 (1 1)     Stool 450 150     Total Output 3128 2320     Net -1235 3 -1560            Unmeasured Urine Occurrence 1 x 3 x     Unmeasured Stool Occurrence 5 x          Weights:   IBW (Ideal Body Weight): 63 8 kg    Body mass index is 28 43 kg/m²  Weight (last 2 days)     Date/Time Weight    23 0600 79 9 (176 15)    23 0600 77 9 (171 74)    23 0553 80 7 (177 91)        Physical Exam  Vitals and nursing note reviewed  Constitutional:       General: He is in acute distress  Appearance: He is ill-appearing  He is not toxic-appearing or diaphoretic  HENT:      Head: Normocephalic and atraumatic  Right Ear: External ear normal       Left Ear: External ear normal       Nose: Nose normal       Mouth/Throat:      Mouth: Mucous membranes are moist    Eyes:      Pupils: Pupils are equal, round, and reactive to light  Cardiovascular:      Rate and Rhythm: Normal rate and regular rhythm  Pulses: Normal pulses  Heart sounds: No friction rub  No gallop  Comments: Distant heart sounds  Pulmonary:      Effort: Respiratory distress present  Breath sounds: Wheezing present  No rhonchi or rales  Chest:      Chest wall: No tenderness  Abdominal:      General: Bowel sounds are normal       Comments: Possible LLQ bruit  Musculoskeletal:         General: No signs of injury  Right lower leg: No edema  Left lower leg: No edema  Lymphadenopathy:      Cervical: No cervical adenopathy  Skin:     General: Skin is warm  Capillary Refill: Capillary refill takes less than 2 seconds  Neurological:      Mental Status: He is alert  Comments: Oriented x2: self, location  Not oriented to situation or date  Psychiatric:      Comments: Visibly frustrated  LABORATORY DATA     Labs: I have personally reviewed pertinent reports  Results from last 7 days   Lab Units 05/01/23  0640 04/30/23  0933 04/30/23  0455 04/29/23  0508 04/27/23  1615 04/27/23  0617 04/26/23  1355 04/26/23  0429 04/25/23  1346 04/25/23  1045   WBC Thousand/uL 18 61*  --  16 93* 19 34*   < > 8 81  --  10 52*  --  20 57*   HEMOGLOBIN g/dL 8 8* 7 1* 6 8* 7 3*   < > 7 2*   < > 8 2*   < > 12 1   HEMATOCRIT % 27 5* 22 6* 21 6* 22 3*   < > 21 3*   < > 24 3*   < > 37 7   PLATELETS Thousands/uL 141*  --  93* 74*   < > 83*  --  74*  --  155   NEUTROS PCT %  --   --   --   --   --  91*  --  86*  --  85*   MONOS PCT %  --   --   --   --   --  3*  --  7  --  7   MONO PCT %  --   --  6  --   --   --   --   --   --   --     < > = values in this interval not displayed        Results from last 7 days   Lab Units 05/01/23  0815 04/30/23  0455 04/29/23  1814 04/29/23  0508   POTASSIUM mmol/L 3 9 3 9 4 7 4 0   CHLORIDE mmol/L 109* 112* 112* 116*   CO2 mmol/L 30 29 27 24   BUN mg/dL 14 15 19 23   CREATININE mg/dL 1 09 1 10 1 32* 1 34*   CALCIUM mg/dL 8 1* 7 1* 7 6* 7 2*   ALK PHOS U/L 272* 238*  --  209*   ALT U/L 72 69  --  57   AST U/L 105* 133*  --  89*     Results from last 7 days   Lab Units 05/01/23  0640 04/30/23  0455 04/29/23  1814   MAGNESIUM mg/dL 2 0 1 8 2 0     Results from last 7 days   Lab Units 05/01/23  0640 04/30/23  0455 04/29/23  1814   PHOSPHORUS mg/dL 2 0* 3 0 4 1      Results from last 7 days   Lab Units 04/28/23  0443 04/27/23  0557 04/26/23  0429 04/25/23  1045   INR  1 32* 1 35* 1 30* 1 21*   PTT seconds  --   --   --  28     Results from last 7 days   Lab Units 05/01/23  0815   LACTIC ACID mmol/L 1 4           IMAGING & DIAGNOSTIC TESTING     Radiology Results: I have personally reviewed pertinent reports  XR chest portable    Result Date: 4/30/2023  Impression: Mild central vascular prominence and increase interstitial and patchy densities in the right lower lung field, as seen previously  Left pleural thickening/trace effusion  Left basilar retrocardiac atelectasis/small infiltrate  Workstation performed: [id number]     XR abdomen 1 view kub    Result Date: 4/26/2023  Impression: Nasogastric tube tip terminates at the mid stomach  Possible mild small bowel distention in the left mid abdomen, nonspecific  This can be reassessed on follow-up  Workstation performed: [id number]     XR abdomen 1 view kub    Result Date: 4/26/2023  Impression: Nasogastric tube has been inserted, tip seen passing towards the stomach, distal end not fully seen  Workstation performed: [id number]     CT head without contrast    Result Date: 4/25/2023  Impression: No acute intracranial abnormality  Workstation performed: [id number]     CTA dissection protocol chest abdomen pelvis w wo contrast    Result Date: 4/25/2023  Impression: 1  No aortic dissection, intramural hematoma, or aortic aneurysm  Patent mesenteric vessels  2  Circumferential mural thickening of the esophagus with an air-fluid level, which may relate to esophagitis and esophageal dysmotility  3  Hepatomegaly with severe hepatic steatosis  I personally discussed this study with Dr Ry Alarcon on 4/25/2023 1:15 PM at 4/25/2023 1:36 PM  Workstation performed: [id number]     XR chest portable ICU    Result Date: 4/30/2023  Impression: Right lower lobe patchy opacity concerning for possible infiltrate  Workstation performed: [id number]     XR chest portable ICU    Result Date: 4/28/2023  Impression: No acute cardiopulmonary disease   Lines and tubes well-positioned with no pneumothorax  Workstation performed: [id number]     XR chest portable ICU    Result Date: 4/26/2023  Impression: No active pulmonary disease  Workstation performed: [id number]     XR chest portable ICU    Result Date: 4/26/2023  Impression: Right IJ line tip has been placed, tip overlies the SVC  No active pulmonary disease  Workstation performed: [id number]     US right upper quadrant    Result Date: 4/27/2023  Impression: 1  Hepatomegaly with severe hepatic steatosis suggested  2   Visualized portions of the pancreas are normal  3   Minimal cholelithiasis  Workstation performed: [id number]     Other Diagnostic Testing: I have personally reviewed pertinent reports      ACTIVE MEDICATIONS     Current Facility-Administered Medications   Medication Dose Route Frequency   • albuterol inhalation solution 2 5 mg  2 5 mg Nebulization Q4H PRN   • budesonide (PULMICORT) inhalation solution 0 5 mg  0 5 mg Nebulization N18B   • folic acid 1 mg in sodium chloride 0 9 % 50 mL IVPB  1 mg Intravenous Daily   • formoterol (PERFOROMIST) nebulizer solution 20 mcg  20 mcg Nebulization Q12H   • insulin lispro (HumaLOG) 100 units/mL subcutaneous injection 2-12 Units  2-12 Units Subcutaneous Q6H [address]   • ipratropium (ATROVENT) 0 02 % inhalation solution 0 5 mg  0 5 mg Nebulization TID   • levothyroxine tablet 50 mcg  50 mcg Oral Early Morning   • midodrine (PROAMATINE) tablet 10 mg  10 mg Oral TID AC   • moisture barrier miconazole 2% cream (aka ANSHU MOISTURE BARRIER ANTIFUNGAL CREAM)   Topical BID   • multivitamin stress formula tablet 1 tablet  1 tablet Oral Daily   • nicotine (NICODERM CQ) 21 mg/24 hr TD 24 hr patch 21 mg  21 mg Transdermal Daily   • pantoprazole (PROTONIX) injection 40 mg  40 mg Intravenous Q24H JUVENTINO   • [START ON 5/2/2023] predniSONE tablet 40 mg  40 mg Oral Daily   • psyllium (METAMUCIL) 1 packet  1 packet Oral Daily   • thiamine tablet 100 mg  100 mg Oral Daily       VTE "Pharmacologic Prophylaxis: Sequential compression device (Venodyne)   VTE Mechanical Prophylaxis: sequential compression device    Portions of the record may have been created with voice recognition software  Occasional wrong word or \"sound a like\" substitutions may have occurred due to the inherent limitations of voice recognition software    Read the chart carefully and recognize, using context, where substitutions have occurred   ==  Heraclio Esquivel MD  [address]  Internal Medicine Residency PGY-1     "

## 2023-05-01 NOTE — PROGRESS NOTES
"Progress Note - Wound   Dontae Ridley 63 y o  male MRN: [id number]  Unit/Bed#: [facility] 901-01 Encounter: [id number]      Assessment:  Irritant contact dermatitis d/t dual incontinence   Cutaneous candidiasis   Ambulatory dysfunction           Plan:  • MASD/IAD continues with partial thickness skin loss with candidiasis present to the b/l sacro-buttocks, perineum and groin  Area of open skin is measuring larger  Hernandez catheter has been removed since last assessment, rectal tube in place  Recommend to continue with ANH antifungal cream   • Recommend to continue with preventative skin care plan in place  • Pressure relief  • Nutrition is following along   • [place] text wound care team with questions or concerns  • Routine wound care follow-up while admitted  AVS updated  Subjective:  Wound care to see patient for follow-up visit of b/l buttock wounds  Admitted with encephalopathy  Patient has a history of stage 4 COPD, diabetes, alcohol abuse, tobacco use, Crohn's disease, HTN, CKD, CAD, and anxiety  Patient is negative for c diff  Patient seen in bed, awake with confusion and irritiability  Dependent for care  Moderate assist of 1 with turning for the assessment  Rectal tube in place with leaking noted, kirby-care rendered at time of the exam  Incontinent of urine  Anh anti-fungal cream is currently in use  No reported fever or chills  Objective:      Vitals: Blood pressure 97/64, pulse (!) 119, temperature 99 7 °F (37 6 °C), temperature source Axillary, resp  rate 22, height 5' 6\" (1 676 m), weight 79 9 kg (176 lb 2 4 oz), SpO2 91 %  ,Body mass index is 28 43 kg/m²  Focused Physical Exam:  1  B/l sacro-buttocks, and perineum with MASD/IAD with fungal component  Scattered areas of irregular shaped partial thickness wounds, measured together  Wound beds are moist pink/yellow in color  Scant serous sanguineous drainage  Edges fragile and attached without maceration   Otherwise, the skin to the " "sacro-buttocks, groin, perineal locations are fragile, denuded, blanchable and intact  Noted satellites lesions with irregular dry peeling borders  2  B/l heels are dry and intact without redness or wounds  No induration, fluctuance, odor, warmth/temperature differences, redness, or purulence noted to the above mentioned wounds and skin areas assessed  New dressings applied as noted above  Patient tolerated assessment well- denies pain and no s/s of non-verbal pain or discomfort observed during the encounter  Lab, Imaging and other studies: I have personally reviewed pertinent reports  Wound 04/28/23 Sacrum (Active)   Wound Image       05/01/23 0821   Wound Length (cm) 22 cm 05/01/23 0821   Wound Width (cm) 13 cm 05/01/23 0821   Wound Depth (cm) 0 1 cm 05/01/23 0821   Wound Surface Area (cm^2) 286 cm^2 05/01/23 0821   Wound Volume (cm^3) 28 6 cm^3 05/01/23 0821   Calculated Wound Volume (cm^3) 28 6 cm^3 05/01/23 0821           Total time spent today:    Total time (face-to-face and non-face-to-face) spent on today's visit was 18 minutes  This includes preparation for the visits (previous wound care consult note, SOD note on 5/1/23 and critical care note on 4/30/23 ) performance of a medically appropriate history and examination, and orders for medications/treatments or testing  Discussed assessment findings, and plan of care/recommendations with patients CANDICE Caruso, AMALIA, ALFONSO      Portions of the record may have been created with voice recognition software  Occasional wrong word or \"sound a like\" substitutions may have occurred due to the inherent limitations of voice recognition software    Read the chart carefully and recognize, using context, where substitutions have occurred      "

## 2023-05-01 NOTE — PLAN OF CARE
Problem: MOBILITY - ADULT  Goal: Maintain or return to baseline ADL function  Description: INTERVENTIONS:  -  Assess patient's ability to carry out ADLs; assess patient's baseline for ADL function and identify physical deficits which impact ability to perform ADLs (bathing, care of mouth/teeth, toileting, grooming, dressing, etc )  - Assess/evaluate cause of self-care deficits   - Assess range of motion  - Assess patient's mobility; develop plan if impaired  - Assess patient's need for assistive devices and provide as appropriate  - Encourage maximum independence but intervene and supervise when necessary  - Involve family in performance of ADLs  - Assess for home care needs following discharge   - Consider OT consult to assist with ADL evaluation and planning for discharge  - Provide patient education as appropriate  Outcome: Progressing  Goal: Maintains/Returns to pre admission functional level  Description: INTERVENTIONS:  - Perform BMAT or MOVE assessment daily    - Set and communicate daily mobility goal to care team and patient/family/caregiver     - Collaborate with rehabilitation services on mobility goals if consulted  Outcome: Progressing     Problem: Prexisting or High Potential for Compromised Skin Integrity  Goal: Skin integrity is maintained or improved  Description: INTERVENTIONS:  - Identify patients at risk for skin breakdown  - Assess and monitor skin integrity  - Assess and monitor nutrition and hydration status  - Monitor labs   - Assess for incontinence   - Turn and reposition patient  - Assist with mobility/ambulation  - Relieve pressure over bony prominences  - Avoid friction and shearing  - Provide appropriate hygiene as needed including keeping skin clean and dry  - Evaluate need for skin moisturizer/barrier cream  - Collaborate with interdisciplinary team   - Patient/family teaching  - Consider wound care consult   Outcome: Progressing     Problem: CARDIOVASCULAR - ADULT  Goal: Maintains optimal cardiac output and hemodynamic stability  Description: INTERVENTIONS:  - Monitor I/O, vital signs and rhythm  - Monitor for S/S and trends of decreased cardiac output  - Administer and titrate ordered vasoactive medications to optimize hemodynamic stability  - Assess quality of pulses, skin color and temperature  - Assess for signs of decreased coronary artery perfusion  - Instruct patient to report change in severity of symptoms  Outcome: Progressing  Goal: Absence of cardiac dysrhythmias or at baseline rhythm  Description: INTERVENTIONS:  - Continuous cardiac monitoring, vital signs, obtain 12 lead EKG if ordered  - Administer antiarrhythmic and heart rate control medications as ordered  - Monitor electrolytes and administer replacement therapy as ordered  Outcome: Progressing     Problem: RESPIRATORY - ADULT  Goal: Achieves optimal ventilation and oxygenation  Description: INTERVENTIONS:  - Assess for changes in respiratory status  - Assess for changes in mentation and behavior  - Position to facilitate oxygenation and minimize respiratory effort  - Oxygen administered by appropriate delivery if ordered  - Initiate smoking cessation education as indicated  - Encourage broncho-pulmonary hygiene including cough, deep breathe, Incentive Spirometry  - Assess the need for suctioning and aspirate as needed  - Assess and instruct to report SOB or any respiratory difficulty  - Respiratory Therapy support as indicated  Outcome: Progressing     Problem: GENITOURINARY - ADULT  Goal: Maintains or returns to baseline urinary function  Description: INTERVENTIONS:  - Assess urinary function  - Encourage oral fluids to ensure adequate hydration if ordered  - Administer IV fluids as ordered to ensure adequate hydration  - Administer ordered medications as needed  - Offer frequent toileting  - Follow urinary retention protocol if ordered  Outcome: Progressing  Goal: Absence of urinary retention  Description: INTERVENTIONS:  - Assess patient’s ability to void and empty bladder  - Monitor I/O  - Bladder scan as needed  - Discuss with physician/AP medications to alleviate retention as needed  - Discuss catheterization for long term situations as appropriate  Outcome: Progressing  Goal: Urinary catheter remains patent  Description: INTERVENTIONS:  - Assess patency of urinary catheter  - If patient has a chronic hernandez, consider changing catheter if non-functioning  - Follow guidelines for intermittent irrigation of non-functioning urinary catheter  Outcome: Progressing     Problem: METABOLIC, FLUID AND ELECTROLYTES - ADULT  Goal: Electrolytes maintained within normal limits  Description: INTERVENTIONS:  - Monitor labs and assess patient for signs and symptoms of electrolyte imbalances  - Administer electrolyte replacement as ordered  - Monitor response to electrolyte replacements, including repeat lab results as appropriate  - Instruct patient on fluid and nutrition as appropriate  Outcome: Progressing  Goal: Fluid balance maintained  Description: INTERVENTIONS:  - Monitor labs   - Monitor I/O and WT  - Instruct patient on fluid and nutrition as appropriate  - Assess for signs & symptoms of volume excess or deficit  Outcome: Progressing  Goal: Glucose maintained within target range  Description: INTERVENTIONS:  - Monitor Blood Glucose as ordered  - Assess for signs and symptoms of hyperglycemia and hypoglycemia  - Administer ordered medications to maintain glucose within target range  - Assess nutritional intake and initiate nutrition service referral as needed  Outcome: Progressing     Problem: SKIN/TISSUE INTEGRITY - ADULT  Goal: Skin Integrity remains intact(Skin Breakdown Prevention)  Description: Assess:  -Perform Itz assessment   -Clean and moisturize skin   -Inspect skin when repositioning, toileting, and assisting with ADLS  -Assess under medical devices   -Assess extremities for adequate circulation and sensation Bed Management:  -Have minimal linens on bed & keep smooth, unwrinkled  -Change linens as needed when moist or perspiring  -Avoid sitting or lying in one position for more than 2 hours while in bed  -Keep HOB at 30 degrees     Toileting:  -Offer bedside commode  -Assess for incontinence   -Use incontinent care products after each incontinent episode     Activity:  --Encourage activity and walks on unit  -Encourage or provide ROM exercises   -Turn and reposition patient every 2 Hours  -Use appropriate equipment to lift or move patient in bed  -Instruct/ Assist with weight shifting every 2 when out of bed in chair    Skin Care:  -Avoid use of baby powder, tape, friction and shearing, hot water or constrictive clothing  -Relieve pressure over bony prominences using Allevyn  -Do not massage red bony areas    Outcome: Progressing  Goal: Incision(s), wounds(s) or drain site(s) healing without S/S of infection  Description: INTERVENTIONS  - Assess and document dressing, incision, wound bed, drain sites and surrounding tissue  - Provide patient and family education    Outcome: Progressing     Problem: HEMATOLOGIC - ADULT  Goal: Maintains hematologic stability  Description: INTERVENTIONS  - Assess for signs and symptoms of bleeding or hemorrhage  - Monitor labs  - Administer supportive blood products/factors as ordered and appropriate  Outcome: Progressing     Problem: Nutrition/Hydration-ADULT  Goal: Nutrient/Hydration intake appropriate for improving, restoring or maintaining nutritional needs  Description: Monitor and assess patient's nutrition/hydration status for malnutrition  Collaborate with interdisciplinary team and initiate plan and interventions as ordered  Monitor patient's weight and dietary intake as ordered or per policy  Utilize nutrition screening tool and intervene as necessary  Determine patient's food preferences and provide high-protein, high-caloric foods as appropriate       INTERVENTIONS:  - Monitor oral intake, urinary output, labs, and treatment plans  - Assess nutrition and hydration status and recommend course of action  - Evaluate amount of meals eaten  - Assist patient with eating if necessary   - Allow adequate time for meals  - Recommend/ encourage appropriate diets, oral nutritional supplements, and vitamin/mineral supplements  - Order, calculate, and assess calorie counts as needed  - Recommend, monitor, and adjust tube feedings and TPN/PPN based on assessed needs  - Assess need for intravenous fluids  - Provide specific nutrition/hydration education as appropriate  - Include patient/family/caregiver in decisions related to nutrition  Outcome: Progressing     Problem: SAFETY,RESTRAINT: NV/NON-SELF DESTRUCTIVE BEHAVIOR  Goal: Remains free of harm/injury (restraint for non violent/non self-detsructive behavior)  Description: INTERVENTIONS:  - Instruct patient/family regarding restraint use   - Assess and monitor physiologic and psychological status   - Provide interventions and comfort measures to meet assessed patient needs   - Identify and implement measures to help patient regain control  - Assess readiness for release of restraint   Outcome: Progressing  Goal: Returns to optimal restraint-free functioning  Description: INTERVENTIONS:  - Assess the patient's behavior and symptoms that indicate continued need for restraint  - Identify and implement measures to help patient regain control  - Assess readiness for release of restraint   Outcome: Progressing

## 2023-05-01 NOTE — PHYSICAL THERAPY NOTE
Physical Therapy Cancellation Note         05/01/23 1105   PT Last Visit   PT Visit Date 05/01/23   Note Type   Note type Cancelled Session   Cancel Reasons Medical status       PT orders received, pt chart reviewed  Pt currently not medically appropriate to participate in therapy session, per RN pt on bi-pap, increased agitation  PT to continue to follow and see pt as appropriate and able  Thank you       Del Malik, PT, DPT

## 2023-05-01 NOTE — SPEECH THERAPY NOTE
Speech Language/Pathology    Speech/Language Pathology Progress Note    Patient Name: Gisel Gordillo  Today's Date: 5/1/2023     Problem List  Principal Problem:    Encephalopathy  Active Problems:    Crohn disease ([facility] [address] )    Coronary artery disease involving native coronary artery of native heart without angina pectoris    Alcohol abuse    Cigarette nicotine dependence without complication    Macrocytic anemia    Acute respiratory failure with hypoxia (HCC)    End stage COPD ([facility] [address] )    Prediabetes    Abnormal liver function       Past Medical History  Past Medical History:   Diagnosis Date   • Anxiety    • Asthma    • Cardiac disease    • Cervical stenosis (uterine cervix)    • Chest pain    • Chronic kidney disease    • Colitis    • Colon polyps    • COPD (chronic obstructive pulmonary disease) (HCC)     2L @ HS and PRN   • Coronary artery disease    • Diverticulitis    • Esophageal reflux    • 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HCC)    • Hyperlipidemia    • Hypertension    • IBD (inflammatory bowel disease)    • Myocardial infarction [facility])    • Old myocardial infarction    • Perforation of colon ([place] [id number] )    • Type 2 diabetes, diet controlled ([place] [address] )    • Ulcerative colitis ([place] [address] )         Past Surgical History  Past Surgical History:   Procedure Laterality Date   • ANGIOPLASTY     • APPENDECTOMY     • COLON SURGERY     • COLONOSCOPY N/A 10/24/2016    Procedure: COLONOSCOPY;  Surgeon: Nino Gasca MD;  Location: [facility]; Service:    • CORONARY ANGIOPLASTY WITH STENT PLACEMENT     • ESOPHAGOGASTRODUODENOSCOPY N/A 10/24/2016    Procedure: ESOPHAGOGASTRODUODENOSCOPY (EGD); Surgeon: Nino Gasca MD;  Location: [facility];   Service:    • EXPLORATORY LAPAROTOMY W/ BOWEL RESECTION N/A 5/22/2018    Procedure: EXPLORATORY LAPAROTOMY, ILIOCOLECTOMY, ILIOCOLONIC ANASTAMOSIS, LOOP ILIOSTOMY, REPAIR OF SEROSAL TEAR, EXTENSIVE LYSIS OF ADHESIONS, WOUND VAC PLACEMENT;  Surgeon: Nino Gasca MD;  [facility];  Service: "Colorectal   • HEMICOLECTOMY     • ILEOSTOMY CLOSURE N/A 10/5/2018    Procedure: Saurav Michie;  Surgeon: Debora Bauer MD;  [facility];  Service: Colorectal   • OTHER SURGICAL HISTORY      stent indications acute myocardial infarction   • MA COLONOSCOPY FLX DX W/COLLJ SPEC WHEN PFRMD N/A 2/6/2018    Procedure: COLONOSCOPY;  Surgeon: Debora Bauer MD;  [facility]: [facility]; Service: Colorectal   • MA COLONOSCOPY FLX DX W/COLLJ SPEC WHEN PFRMD N/A 10/4/2018    Procedure: COLONOSCOPY;  Surgeon: Debora Bauer MD;  [facility]: [facility]; Service: Colorectal   • TONSILLECTOMY           Subjective:  Pt awake and alert  Pt requiring BiPAP earlier today, now on [address]  \"Can't get no thin water around here  \"     Current Diet:   Puree/honey thick liquids     Objective:  Pt seen for dx dysphagia f/u tx  Pt assessed w/ lunch tray of puree textures (corn, beef, mashed potatoes, applesauce), honey thick liquids (apple juice), trials of nectar thick (apple juice) and thin liquids (cranberry juice)  Complete labial seal for retrieval and containment of all materials via tsp and straw sip  Min prolonged lingual manipulation w/ cohesive bolus formation and timely transfer  Suspected delayed swallow initiation and reduced hyolaryngeal elevation to palpation  Cough/throat clear present w/ thin and nectar thick liquids  S/s aspiration not eliminated w/ use of tsp vs cup vs straw  No overt s/s of aspiration w/ puree textures or honey thick liquids  Pt declined trials of soft/advanced texture solids at this time  S/w pt regarding rationale for continuation of puree textures and honey thick liquids at this time and potential MBS in the upcoming days as medically/clinically appropriate  Pt verbalized understanding and agreement, denies questions or concerns at this time  S/w RN, pt not appropriate 2/2 periods of agitation and current respiratory status/intermittent need for BiPAP   Will proceed w/ MBS when pt " is appropriate  Assessment:  Pt able to tolerate current diet of puree textures and honey thick liquids at this time w/ no overt s/s of aspiration or oropharygneal difficulties  Overt s/s of aspiration w/ nectar thick and thin liquids       Plan/Recommendations:  Puree/honey thick liquids   Frequent/thorough oral care   ST to f/u as able and appropriate, pending MBS when pt is appropriate

## 2023-05-01 NOTE — OCCUPATIONAL THERAPY NOTE
Occupational Therapy Cancel Note         Patient Name: Lula Craig  Today's Date: 5/1/2023 05/01/23 0827   OT Last Visit   OT Visit Date 05/01/23   Note Type   Note type Cancelled Session   Cancel Reasons Medical status       OT orders received  Chart reviewed  Per RN, pt on bi-pap, agitated, not medically appropriate for session at this time  Will continue to follow and see pt as appropriate and able       Luciana Pacheco MS, OTR/L

## 2023-05-02 LAB
ALBUMIN SERPL BCP-MCNC: 2.5 G/DL (ref 3.5–5)
ALP SERPL-CCNC: 255 U/L (ref 46–116)
ALT SERPL W P-5'-P-CCNC: 65 U/L (ref 12–78)
ANION GAP SERPL CALCULATED.3IONS-SCNC: 3 MMOL/L (ref 4–13)
AST SERPL W P-5'-P-CCNC: 88 U/L (ref 5–45)
BILIRUB SERPL-MCNC: 2.07 MG/DL (ref 0.2–1)
BUN SERPL-MCNC: 14 MG/DL (ref 5–25)
CALCIUM ALBUM COR SERPL-MCNC: 9.6 MG/DL (ref 8.3–10.1)
CALCIUM SERPL-MCNC: 8.4 MG/DL (ref 8.3–10.1)
CHLORIDE SERPL-SCNC: 107 MMOL/L (ref 96–108)
CO2 SERPL-SCNC: 33 MMOL/L (ref 21–32)
CORTIS AM PEAK SERPL-MCNC: 21.4 UG/DL (ref 4.2–22.4)
CREAT SERPL-MCNC: 0.99 MG/DL (ref 0.6–1.3)
ERYTHROCYTE [DISTWIDTH] IN BLOOD BY AUTOMATED COUNT: 16.4 % (ref 11.6–15.1)
GFR SERPL CREATININE-BSD FRML MDRD: 82 ML/MIN/1.73SQ M
GLUCOSE SERPL-MCNC: 100 MG/DL (ref 65–140)
GLUCOSE SERPL-MCNC: 142 MG/DL (ref 65–140)
GLUCOSE SERPL-MCNC: 150 MG/DL (ref 65–140)
GLUCOSE SERPL-MCNC: 151 MG/DL (ref 65–140)
GLUCOSE SERPL-MCNC: 97 MG/DL (ref 65–140)
HCT VFR BLD AUTO: 24.8 % (ref 36.5–49.3)
HGB BLD-MCNC: 7.9 G/DL (ref 12–17)
MAGNESIUM SERPL-MCNC: 1.7 MG/DL (ref 1.6–2.6)
MCH RBC QN AUTO: 33.6 PG (ref 26.8–34.3)
MCHC RBC AUTO-ENTMCNC: 31.9 G/DL (ref 31.4–37.4)
MCV RBC AUTO: 106 FL (ref 82–98)
PHOSPHATE SERPL-MCNC: 1.3 MG/DL (ref 2.3–4.1)
PLATELET # BLD AUTO: 143 THOUSANDS/UL (ref 149–390)
PMV BLD AUTO: 10.8 FL (ref 8.9–12.7)
POTASSIUM SERPL-SCNC: 3.8 MMOL/L (ref 3.5–5.3)
PROT SERPL-MCNC: 5.4 G/DL (ref 6.4–8.4)
RBC # BLD AUTO: 2.35 MILLION/UL (ref 3.88–5.62)
SODIUM SERPL-SCNC: 143 MMOL/L (ref 135–147)
WBC # BLD AUTO: 13.66 THOUSAND/UL (ref 4.31–10.16)

## 2023-05-02 RX ORDER — LEVALBUTEROL INHALATION SOLUTION 1.25 MG/3ML
1.25 SOLUTION RESPIRATORY (INHALATION)
Status: DISCONTINUED | OUTPATIENT
Start: 2023-05-02 | End: 2023-05-06 | Stop reason: HOSPADM

## 2023-05-02 RX ORDER — MAGNESIUM SULFATE HEPTAHYDRATE 40 MG/ML
2 INJECTION, SOLUTION INTRAVENOUS ONCE
Status: COMPLETED | OUTPATIENT
Start: 2023-05-02 | End: 2023-05-02

## 2023-05-02 RX ORDER — GUAIFENESIN 100 MG/5ML
200 SOLUTION ORAL EVERY 4 HOURS PRN
Status: DISCONTINUED | OUTPATIENT
Start: 2023-05-02 | End: 2023-05-06 | Stop reason: HOSPADM

## 2023-05-02 RX ORDER — ALBUMIN (HUMAN) 12.5 G/50ML
25 SOLUTION INTRAVENOUS ONCE
Status: COMPLETED | OUTPATIENT
Start: 2023-05-02 | End: 2023-05-02

## 2023-05-02 RX ORDER — PANTOPRAZOLE SODIUM 40 MG/1
40 TABLET, DELAYED RELEASE ORAL
Status: DISCONTINUED | OUTPATIENT
Start: 2023-05-03 | End: 2023-05-06 | Stop reason: HOSPADM

## 2023-05-02 RX ADMIN — FOLIC ACID 1 MG: 5 INJECTION, SOLUTION INTRAMUSCULAR; INTRAVENOUS; SUBCUTANEOUS at 08:48

## 2023-05-02 RX ADMIN — MIDODRINE HYDROCHLORIDE 10 MG: 5 TABLET ORAL at 11:52

## 2023-05-02 RX ADMIN — IPRATROPIUM BROMIDE 0.5 MG: 0.5 SOLUTION RESPIRATORY (INHALATION) at 19:40

## 2023-05-02 RX ADMIN — B-COMPLEX W/ C & FOLIC ACID TAB 1 TABLET: TAB at 08:47

## 2023-05-02 RX ADMIN — MICONAZOLE NITRATE 1 APPLICATION.: 20 CREAM TOPICAL at 08:48

## 2023-05-02 RX ADMIN — FORMOTEROL FUMARATE DIHYDRATE 20 MCG: 20 SOLUTION RESPIRATORY (INHALATION) at 07:25

## 2023-05-02 RX ADMIN — MIDODRINE HYDROCHLORIDE 10 MG: 5 TABLET ORAL at 14:53

## 2023-05-02 RX ADMIN — NICOTINE 21 MG: 21 PATCH, EXTENDED RELEASE TRANSDERMAL at 08:48

## 2023-05-02 RX ADMIN — IPRATROPIUM BROMIDE 0.5 MG: 0.5 SOLUTION RESPIRATORY (INHALATION) at 13:15

## 2023-05-02 RX ADMIN — MAGNESIUM SULFATE HEPTAHYDRATE 2 G: 40 INJECTION, SOLUTION INTRAVENOUS at 14:38

## 2023-05-02 RX ADMIN — POTASSIUM & SODIUM PHOSPHATES POWDER PACK 280-160-250 MG 2 PACKET: 280-160-250 PACK at 14:37

## 2023-05-02 RX ADMIN — THIAMINE HCL TAB 100 MG 100 MG: 100 TAB at 08:47

## 2023-05-02 RX ADMIN — BUDESONIDE 0.5 MG: 0.5 INHALANT ORAL at 19:40

## 2023-05-02 RX ADMIN — LEVOTHYROXINE SODIUM 50 MCG: 50 TABLET ORAL at 08:47

## 2023-05-02 RX ADMIN — IPRATROPIUM BROMIDE 0.5 MG: 0.5 SOLUTION RESPIRATORY (INHALATION) at 07:25

## 2023-05-02 RX ADMIN — LEVALBUTEROL HYDROCHLORIDE 1.25 MG: 1.25 SOLUTION RESPIRATORY (INHALATION) at 19:40

## 2023-05-02 RX ADMIN — ALBUMIN (HUMAN) 25 G: 0.25 INJECTION, SOLUTION INTRAVENOUS at 15:11

## 2023-05-02 RX ADMIN — ENOXAPARIN SODIUM 40 MG: 40 INJECTION SUBCUTANEOUS at 08:48

## 2023-05-02 RX ADMIN — BUDESONIDE 0.5 MG: 0.5 INHALANT ORAL at 07:25

## 2023-05-02 RX ADMIN — ALBUTEROL SULFATE 2.5 MG: 2.5 SOLUTION RESPIRATORY (INHALATION) at 13:15

## 2023-05-02 RX ADMIN — SODIUM PHOSPHATE, MONOBASIC, MONOHYDRATE AND SODIUM PHOSPHATE, DIBASIC, ANHYDROUS 30 MMOL: 142; 276 INJECTION, SOLUTION INTRAVENOUS at 15:37

## 2023-05-02 RX ADMIN — MICONAZOLE NITRATE: 20 CREAM TOPICAL at 17:40

## 2023-05-02 RX ADMIN — INSULIN LISPRO 2 UNITS: 100 INJECTION, SOLUTION INTRAVENOUS; SUBCUTANEOUS at 08:48

## 2023-05-02 RX ADMIN — PANTOPRAZOLE SODIUM 40 MG: 40 INJECTION, POWDER, FOR SOLUTION INTRAVENOUS at 08:48

## 2023-05-02 RX ADMIN — MIDODRINE HYDROCHLORIDE 10 MG: 5 TABLET ORAL at 06:29

## 2023-05-02 RX ADMIN — FORMOTEROL FUMARATE DIHYDRATE 20 MCG: 20 SOLUTION RESPIRATORY (INHALATION) at 19:40

## 2023-05-02 RX ADMIN — INSULIN LISPRO 2 UNITS: 100 INJECTION, SOLUTION INTRAVENOUS; SUBCUTANEOUS at 11:52

## 2023-05-02 RX ADMIN — PREDNISONE 40 MG: 20 TABLET ORAL at 08:48

## 2023-05-02 NOTE — PROGRESS NOTES
The pantoprazole has / have been converted to Oral per [facility] IV-to-PO Auto-Conversion Protocol for Adults as approved by the Pharmacy and Therapeutics Committee  The patient met all eligible criteria:  3 Age = 27years old   2) Received at least one dose of the IV form   3) Receiving at least one other scheduled oral/enteral medication   4) Tolerating an oral/enteral diet   and did not have any exclusions:   1) Critical care patient   2) Active GI bleed (IF assessing H2RAs or PPIs)   3) Continuous tube feeding (IF assessing cipro, doxycycline, levofloxacin, minocycline, rifampin, or voriconazole)   4) Receiving PO vancomycin (IF assessing metronidazole)   5) Persistent nausea and/or vomiting   6) Ileus or gastrointestinal obstruction   7) Mazin/nasogastric tube set for continuous suction   8) Specific order not to automatically convert to PO (in the order's comments or if discussed in the most recent Infectious Disease or primary team's progress notes)       Beni Guadarrama, PharmD

## 2023-05-02 NOTE — ASSESSMENT & PLAN NOTE
Patient presented with acute hypoxia and increased work of breathing  Required continuous bipap and ultimately intubated for airway protection in the setting of treating alcohol withdrawal  Sputum culture growing candida, however, per ICU sign out likely contaminant and no need to treat  Extubated on 4/28 to nasal cannula and has been titrated down to 2L  Patient's daughter reports that patient does have O2 at home, but he never uses it  Stage IV COPD based on outpatient documentation  -5/1 AM patient in increased respiratory distress, accessory muscle use on 6L NC- back on BIPAP      Plan:  -5/3 AM patient on 10 -> 8 -> 6L NC   -Goal to wean to ~2L-room air    -Will reach out to family regarding home O2   -Bipap at night    -Trops, lactic WNL, Procal 1 04  -CXR 5/1: Persistent retrocardiac atelectasis/mild airspace disease   -Etiology unclear - possibly 2/2 metabolic derangements in setting of alcohol ketoacidosis vs  COPD vs  Underlying infection  -Prednisone 40 mg, S/P solumedrol    -Completed 5 days of Cefepime and Vancomycin   -Continue to titrate O2 to maintain SpO2 >88%  -Continue atrovent, performist, and pulmicort  -PRN albuterol

## 2023-05-02 NOTE — PLAN OF CARE
Problem: PHYSICAL THERAPY ADULT  Goal: Performs mobility at highest level of function for planned discharge setting  See evaluation for individualized goals  Description: Treatment/Interventions: ADL retraining, Functional transfer training, LE strengthening/ROM, Elevations, Therapeutic exercise, Endurance training, Patient/family training, Cognitive reorientation, Equipment eval/education, Bed mobility, Gait training, Spoke to nursing, Spoke to case management, OT          See flowsheet documentation for full assessment, interventions and recommendations  Note: Prognosis: Fair  Problem List: Decreased strength, Decreased range of motion, Decreased endurance, Impaired balance, Decreased mobility, Decreased cognition, Impaired judgement, Decreased coordination, Decreased safety awareness, Pain  Assessment: Pt is a 63 y o  male seen for PT evaluation s/p admit to [facility] on 4/25/[facility]23  Pt was admitted with a primary dx of: encepholapthy  PT now consulted for assessment of mobility and d/c needs  Pt with Up with assistance orders  Pts current comorbidities effecting treatment include: Chron disease, CAD, alcohol abuse, anemia, acute respiratory failure, end stage COPD, prediabetes  Pts current clinical presentation is Unstable/ Unpredictable (high complexity) due to Ongoing medical management for primary dx, Increased reliance on more restrictive AD compared to baseline, Decreased activity tolerance compared to baseline, Fall risk, Increased assistance needed from caregiver at current time, Ongoing telemetry monitoring, Cog status, Increased O2 via NC from pts baseline, Trending lab values, Continuous pulse oximetry monitoring   Prior to admission, pt was residing with daughter + grandchildren in [address] [place] [address], was independent without AD use per chart review   Upon evaluation, pt currently is requiring mod Ax1-2 for bed mobility; PT to continue to follow and assess functional transfers + ambulation as appropriate and able  Pt presents at PT eval functioning below baseline and currently w/ overall mobility deficits 2* to: BLE weakness, decreased ROM, impaired balance, decreased endurance, impaired coordination, gait deviations, pain, decreased activity tolerance compared to baseline, decreased functional mobility tolerance compared to baseline, decreased safety awareness, impaired judgement, fall risk, decreased cognition  Pt currently at a fall risk 2* to impairments listed above  Pt will continue to benefit from skilled acute PT interventions to address stated impairments; to maximize functional mobility; for ongoing pt/ family training; and DME needs  At conclusion of PT session pt returned BTB with phone and call bell within reach  Pt denies any further questions at this time  The patient's AM-PAC Basic Mobility Inpatient Short Form Raw Score is 9  A Raw score of less than or equal to 16 suggests the patient may benefit from discharge to post-acute rehabilitation services  Please also refer to the recommendation of the Physical Therapist for safe discharge planning  Recommend rehab upon hospital D/C  Barriers to Discharge: Inaccessible home environment     PT Discharge Recommendation: Post acute rehabilitation services    See flowsheet documentation for full assessment

## 2023-05-02 NOTE — OCCUPATIONAL THERAPY NOTE
Occupational Therapy Evaluation     Patient Name: Fili Erickson  Today's Date: 5/2/2023  Problem List  Principal Problem:    Encephalopathy  Active Problems:    Crohn disease ([facility] )    Coronary artery disease involving native coronary artery of native heart without angina pectoris    Alcohol abuse    Cigarette nicotine dependence without complication    Macrocytic anemia    Acute respiratory failure with hypoxia (HCC)    End stage COPD ([facility] )    Prediabetes    Abnormal liver function    Past Medical History  Past Medical History:   Diagnosis Date   • Anxiety    • Asthma    • Cardiac disease    • Cervical stenosis (uterine cervix)    • Chest pain    • Chronic kidney disease    • Colitis    • Colon polyps    • COPD (chronic obstructive pulmonary disease) (HCC)     2L @ HS and PRN   • Coronary artery disease    • Diverticulitis    • Esophageal reflux    • 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HCC)    • Hyperlipidemia    • Hypertension    • IBD (inflammatory bowel disease)    • Myocardial infarction [facility])    • Old myocardial infarction    • Perforation of colon ([facility] )    • Type 2 diabetes, diet controlled ([place] [address] )    • Ulcerative colitis ([place] [address] )      Past Surgical History  Past Surgical History:   Procedure Laterality Date   • ANGIOPLASTY     • APPENDECTOMY     • COLON SURGERY     • COLONOSCOPY N/A 10/24/2016    Procedure: COLONOSCOPY;  Surgeon: Valeri Schaumann, MD;  Location: [facility]; Service:    • CORONARY ANGIOPLASTY WITH STENT PLACEMENT     • ESOPHAGOGASTRODUODENOSCOPY N/A 10/24/2016    Procedure: ESOPHAGOGASTRODUODENOSCOPY (EGD); Surgeon: Valeri Schaumann, MD;  Location: [facility];   Service:    • EXPLORATORY LAPAROTOMY W/ BOWEL RESECTION N/A 5/22/2018    Procedure: EXPLORATORY LAPAROTOMY, ILIOCOLECTOMY, ILIOCOLONIC ANASTAMOSIS, LOOP ILIOSTOMY, REPAIR OF SEROSAL TEAR, EXTENSIVE LYSIS OF ADHESIONS, WOUND VAC PLACEMENT;  Surgeon: Valeri Schaumann, MD;  Location[facility];  Service: Colorectal   • HEMICOLECTOMY     • "ILEOSTOMY CLOSURE N/A 10/5/2018    Procedure: CLOSURE ILEOSTOMY;  Surgeon: Chuy Cristobal MD;  Location[facility];  Service: Colorectal   • OTHER SURGICAL HISTORY      stent indications acute myocardial infarction   • AL COLONOSCOPY FLX DX W/COLLJ SPEC WHEN PFRMD N/A 2/6/2018    Procedure: COLONOSCOPY;  Surgeon: Chuy Cristobal MD;  [facility]: [facility]; Service: Colorectal   • AL COLONOSCOPY FLX DX W/COLLJ SPEC WHEN PFRMD N/A 10/4/2018    Procedure: COLONOSCOPY;  Surgeon: Chuy Cristobal MD;  [facility]: [facility]; Service: Colorectal   • TONSILLECTOMY          05/02/23 0904   OT Last Visit   OT Visit Date 05/02/23   Note Type   Note type Evaluation   Pain Assessment   Pain Assessment Tool 0-10   Pain Score 6   Pain Location/Orientation Location: Abdomen   Hospital Pain Intervention(s) Repositioned; Ambulation/increased activity   Restrictions/Precautions   Weight Bearing Precautions Per Order No   Other Precautions Cognitive; Chair Alarm; Bed Alarm;Multiple lines;O2;Pain; Fall Risk;Telemetry  (rectal tube)   Home Living   Type of [address] [place] [address] Two level;Performs ADLs on one level  (per chart, resides on first floor with BSC and sleeping in recliner)   Bathroom Equipment Commode   Additional Comments Pt is a poor historian, information obtained from chart   Prior Function   Level of [place] Independent with ADLs   Lives With Daughter;Family  (dtr and 3 grandkids)   Receives Help From Family   IADLs Independent with driving   Vocational Unemployed   Comments Pt is a poor historian, information obtained from chart   Lifestyle   Autonomy Per chart, PTA, pt was I with ADLs and fnxl mobility, unclear as to whether pt is able to complete IADLs, will follow up with CM   (-)    Reciprocal Relationships Dtr, grandkids   Service to Others Unemployed   Intrinsic Gratification \"not much\"   ADL   Where Assessed Edge of bed   Eating Assistance 5  Supervision/Setup   Grooming Assistance 4  Minimal " Assistance   UB Bathing Assistance 3  Moderate Assistance   LB Bathing Assistance 2  Maximal Assistance   UB Dressing Assistance 3  Moderate Assistance   LB Dressing Assistance 2  Maximal Assistance   Toileting Assistance  1  Total Assistance   Bed Mobility   Supine to Sit 3  Moderate assistance   Additional items Assist x 2;HOB elevated; Increased time required;LE management   Sit to Supine 3  Moderate assistance   Additional items Assist x 1;HOB elevated; Bedrails; Increased time required;LE management   Transfers   Sit to Stand Unable to assess   Stand to Sit Unable to assess   Additional Comments pt defers OOB at this time, despite encouragement   Balance   Static Sitting Fair -   Dynamic Sitting Poor +   Activity Tolerance   Activity Tolerance Patient limited by fatigue;Patient limited by pain   Medical Staff Made Aware PT Maureen Cooper   Nurse Made Aware RN clearance for session   RUE Assessment   RUE Assessment   (AROM grossly WFL)   LUE Assessment   LUE Assessment   (AROM grossly WFL)   Cognition   Overall Cognitive Status Impaired   Arousal/Participation Responsive   Attention Attends with cues to redirect   Orientation Level Oriented to person;Oriented to place; Disoriented to time   Memory Decreased recall of recent events;Decreased short term memory   Following Commands Follows one step commands with increased time or repetition   Comments Pt with flat affect, pt reports feeling overwhelmed/anxious at times   Assessment   Limitation Decreased ADL status; Decreased UE strength;Decreased Safe judgement during ADL;Decreased cognition;Decreased endurance;Decreased self-care trans;Decreased high-level ADLs   Prognosis Fair   Assessment Pt is a 63 y o  male admitted to [facility] on 4/25/2023 w/ Encephalopathy, acute respiratory failure with hypoxia    has a past medical history of Anxiety, Asthma, Cardiac disease, Cervical stenosis, Chest pain, Chronic kidney disease, Colitis, Colon polyps, COPD, Coronary artery disease, Diverticulitis, Esophageal reflux, Granular cell carcinoma, Hyperlipidemia, Hypertension, IBD, Myocardial infarction, Perforation of colon, DM, and Ulcerative colitis  Pt with active OT orders and early mobilization orders  Pt seen as a co-evaluation with PT due to the patient's co-morbidities, clinically unstable presentation/clinical complexity, and present impairments  Pt is a poor historian  Per chart, pta, pt resides with his dtr and 3 grandkids in a 2S, [facility] with recliner and BSC, 0STE  Pt was I w/  ADLS, (+) drove  Upon evaluation, pt currently requires MOD A x 2 sup to sit, MOD A x 1 sit to supine  Pt defers OOB/STS at this time, despite encouragement  Pt currently requires S eating, MIN A grooming, MOD A UB ADLs, MAX A LB ADLs, and DEP toileting  Pt is limited at this time 2*: pain, endurance, activity tolerance, functional mobility, balance, functional standing tolerance, decreased I w/ ADLS/IADLS, strength, cognitive impairments and decreased safety awareness  The following Occupational Performance Areas to address include: eating, grooming, bathing/shower, toilet hygiene, dressing, health maintenance, functional mobility, community mobility and clothing management  Pt to benefit from immediate acute skilled OT to address above deficits, improve overall functional independence, maximize fnxl mobility and reduce caregiver burden  From OT standpoint, recommendation at time of d/c would be STR  Pt was left after session with all current needs met  The patient's raw score on the AM-PAC Daily Activity Inpatient Short Form is 14  A raw score of less than 19 suggests the patient may benefit from discharge to post-acute rehabilitation services  Please refer to the recommendation of the Occupational Therapist for safe discharge planning  Goals   LTG Time Frame 10-14   Plan   Treatment Interventions ADL retraining;Functional transfer training;UE strengthening/ROM; Endurance training;Cognitive reorientation;Patient/family training;Equipment evaluation/education; Compensatory technique education;Continued evaluation; Energy conservation; Activityengagement   Goal Expiration Date 05/16/23   OT Frequency 2-3x/wk   Recommendation   OT Discharge Recommendation Post acute rehabilitation services   AM-PAC Daily Activity Inpatient   Lower Body Dressing 2   Bathing 2   Toileting 2   Upper Body Dressing 2   Grooming 3   Eating 3   Daily Activity Raw Score 14   Daily Activity Standardized Score (Calc for Raw Score >=11) 33 39   AM-PAC Applied Cognition Inpatient   Following a Speech/Presentation 3   Understanding Ordinary Conversation 3   Taking Medications 3   Remembering Where Things Are Placed or Put Away 2   Remembering List of 4-5 Errands 2   Taking Care of Complicated Tasks 1   Applied Cognition Raw Score 14   Applied Cognition Standardized Score 32 02     GOALS    - Pt will complete UB dressing/self care/bathing w/ S using adaptive device and DME as needed    - Pt will complete LB dressing/self care/bathing w/ min A using adaptive device and DME as needed    - Pt will complete toileting w/ min A w/ G hygiene/thoroughness using DME as needed    - Pt will improve functional transfers to S on/off all surfaces using DME as needed w/ G balance/safety     - Pt will improve functional mobility during ADL/IADL/leisure tasks to min A using DME as needed w/ G balance/safety     - Pt will demonstrate G carryover of pt/caregiver education and training as appropriate      - Pt will demonstrate 100% carryover of energy conservation techniques t/o functional I/ADL/leisure tasks w/o cues s/p skilled education    - Pt will engage in ongoing cognitive assessment w/ G participation to assist w/ safe d/c planning/recommendations    - Pt will increase static and dynamic standing/sitting balance to F  using AD/DME as needed to increase safety and I during fnxl transfers and ADLs    - Pt will maintain upright sitting position for at least 20 min during dynamic fnxl activity with F balance and endurance to improve ADL performance and independence, as well as reduce risk of falls     - Pt will participate in simulated IADL management task with DME as needed to increase independence to  w/ G safety and endurance    Yovani Auguste MS, OTR/L

## 2023-05-02 NOTE — CASE MANAGEMENT
Case Management Discharge Planning Note    Patient name Gina Cleaning  Location [facility]-01 MRN [id number]  : 1960 Date 2023       Current Admission Date: 2023  Current Admission Diagnosis:Encephalopathy   Patient Active Problem List    Diagnosis Date Noted   • Acute respiratory failure with hypoxia ([facility] ) 2023   • End stage COPD ([facility] ) 2023   • Prediabetes 2023   • Abnormal liver function 2023   • Screening for diabetes mellitus 2019   • Status post reversal of ileostomy 10/25/2018   • Hypercalcemia 2018   • Hematemesis with nausea 2018   • Presence of drug-eluting stent in right coronary artery 2018   • Paroxysmal atrial fibrillation ([facility] ) 2018   • Encephalopathy 2018   • Hypotension 2018   • Macrocytic anemia 2018   • Hypocalcemia 2018   • Perforation of colon (HCC)    • Moderate protein-calorie malnutrition ([facility] ) 2018   • Heart failure, systolic, due to idiopathic cardiomyopathy ([facility] ) 2018   • Agitation 2018   • Alcohol abuse 05/15/2018   • Cigarette nicotine dependence without complication 85/15/6493   • Stage 4 very severe COPD by GOLD classification ([place] [address] ) 2017   • Spinal stenosis of cervical region 2016   • Pericarditis 2016   • Coronary artery disease involving native coronary artery of native heart without angina pectoris 2016   • Crohn disease ([facility] ) 10/22/2016   • Atherosclerosis of coronary artery 2016   • Dyslipidemia 10/19/2013      LOS (days): 7  Geometric Mean LOS (GMLOS) (days):   Days to GMLOS:     OBJECTIVE:  Risk of Unplanned Readmission Score: 17 8         Current admission status: Inpatient   Preferred Pharmacy:   [facility]  Phone: [phone] Fax: [phone]    Primary Care Provider: Derrick Rosario DO    Primary Insurance: [facility]  Secondary Insurance:     DISCHARGE DETAILS:    MILENA [id number]-[phone], available for assistance    Met with pt, discussed therapy recommendation for STR  Declined, agreeable to Miryam Vrema  Agreeable to discussion with dghter    TCT dghter Аннаlisa Martinezos, prefers pt to come home with Miryam Verma  States he has had in the past    TCT SOO Ryan, advised DCP at this time is Miryam Verma    A post acute care recommendation was made by your care team for Miryam Vrema  Discussed Freedom of Choice with patient  List of agencies given to patient via in person  patient aware the list is custom filtered for them by preference  and that [facility] post acute providers are designated  Agreeable to blanket referrals  Same entered in [place] from [facility], [address] [place] [address], [phone], update on DCP for pt  States case was opened for self neglect  She plans to visit pt in the hospital prior to DC    Advised pt and dghter want pt to return home with Miyram Verma

## 2023-05-02 NOTE — ASSESSMENT & PLAN NOTE
Since extubation, patient has been AAOx1 only  Able to state his name, but is unable to state location, date, situation  Daughter reports he also was unable to identify her when she visited on 4/28  His baseline is AAOx4 without cognitive deficit  Etiology unclear at this time, possibly 2/2 withdrawal vs  Wernicke's vs  Metabolic abnormalities vs  anoxic injury in setting of hypotension  [facility] on presentation negative  Infectious workup has also been negative  vEEG negative      Plan:  -Oriented at this time   -Continue to monitor at this time with recent D/C of many CNS altering medications  -Delirium precautions  -Promote sleep/wake cycle  -Avoid metabolic derangements  -Continue thiamine and folate supplementation

## 2023-05-02 NOTE — PROGRESS NOTES
Deterioration Index Critical Care Recommendations  Room #: 801  Deterioration index score: 64 4%    Critical Care recommends repletion of electrolytes  Check Ical in AM  Continue to monitor tachycardia and downtrending Hgb  Spoke with Trena Cortez, PGY1 SOD Resident and Re Rowan  from primary team    Brief summary:   Critical care was brought to the patient's bedside via deterioration index alert  The alert was concerning for:  18% [place] coma scale 13  17% Supplemental oxygen Nasal cannula  12 Age 63years old  16% Systolic 92  9% Respiratory rate 20  7% Pulse 113  7% Cardiac rhythm Sinus tachycardia  5% WBC count abnormal (13 66 Thousand/uL)      However, upon my evaluation, GCS is 15, patient is being weaned on o2 requirements- currently requiring 5L  He is tachycardic, however this is likely due to the albuterol nebulizer he recently received  He denies generalized pain, chest pain, SOB, dyspnea, abd pain, N/V  He states that he is mad that the  wants him to go to rehab and that's why his heart rate is high  He is also thirsty and was given water  Please contact critical care via [employer] with any questions or concerns

## 2023-05-02 NOTE — PLAN OF CARE
Problem: OCCUPATIONAL THERAPY ADULT  Goal: Performs self-care activities at highest level of function for planned discharge setting  See evaluation for individualized goals  Description: Treatment Interventions: ADL retraining, Functional transfer training, UE strengthening/ROM, Endurance training, Cognitive reorientation, Patient/family training, Equipment evaluation/education, Compensatory technique education, Continued evaluation, Energy conservation, Activityengagement          See flowsheet documentation for full assessment, interventions and recommendations  Note: Limitation: Decreased ADL status, Decreased UE strength, Decreased Safe judgement during ADL, Decreased cognition, Decreased endurance, Decreased self-care trans, Decreased high-level ADLs  Prognosis: Fair  Assessment: Pt is a 63 y o  male admitted to [facility] on 4/25/2023 w/ Encephalopathy, acute respiratory failure with hypoxia  has a past medical history of Anxiety, Asthma, Cardiac disease, Cervical stenosis, Chest pain, Chronic kidney disease, Colitis, Colon polyps, COPD, Coronary artery disease, Diverticulitis, Esophageal reflux, Granular cell carcinoma, Hyperlipidemia, Hypertension, IBD, Myocardial infarction, Perforation of colon, DM, and Ulcerative colitis  Pt with active OT orders and early mobilization orders  Pt seen as a co-evaluation with PT due to the patient's co-morbidities, clinically unstable presentation/clinical complexity, and present impairments  Pt is a poor historian  Per chart, pta, pt resides with his dtr and 3 grandkids in a 2STH, SU with recliner and BSC, 0STE  Pt was I w/  ADLS, (+) drove  Upon evaluation, pt currently requires MOD A x 2 sup to sit, MOD A x 1 sit to supine  Pt defers OOB/STS at this time, despite encouragement  Pt currently requires S eating, MIN A grooming, MOD A UB ADLs, MAX A LB ADLs, and DEP toileting   Pt is limited at this time 2*: pain, endurance, activity tolerance, functional mobility, balance, functional standing tolerance, decreased I w/ ADLS/IADLS, strength, cognitive impairments and decreased safety awareness  The following Occupational Performance Areas to address include: eating, grooming, bathing/shower, toilet hygiene, dressing, health maintenance, functional mobility, community mobility and clothing management  Pt to benefit from immediate acute skilled OT to address above deficits, improve overall functional independence, maximize fnxl mobility and reduce caregiver burden  From OT standpoint, recommendation at time of d/c would be STR  Pt was left after session with all current needs met  The patient's raw score on the -PAC Daily Activity Inpatient Short Form is 14  A raw score of less than 19 suggests the patient may benefit from discharge to post-acute rehabilitation services  Please refer to the recommendation of the Occupational Therapist for safe discharge planning       OT Discharge Recommendation: Post acute rehabilitation services

## 2023-05-02 NOTE — ASSESSMENT & PLAN NOTE
Patient presented with , ALT 66, alk phos 263, and T  bili 1 10 with unclear baseline as patient without recent CMP to evaluate  CT of the chest abdomen pelvis completed on 4/25 significant for hepatomegaly and severe hepatic steatosis  Chronic hepatitis panel completed was negative  Right upper quadrant ultrasound done on 4/27 demonstrated hepatomegaly with severe hepatic steatosis without concern for cholecystitis or signs of cirrhosis      Plan:  -Currently AST/ALT have plateaued to improving   -T  bili 2 36 -> 2 32 -> 2 07 -> 2 09 today   -Possibly in setting of ongoing drinking with appropriate ratio  -Continue to monitor daily CMP/INR

## 2023-05-02 NOTE — PROGRESS NOTES
Patient HR still sitting in 110s on tele monitor  Manual BP 82/56  Patient asymptomatic  S [place] aware, will come see pt  See new orders

## 2023-05-02 NOTE — ASSESSMENT & PLAN NOTE
Hemoglobin on presentation 12 1 with unclear baseline  Previous labs done in 2018 demonstrate hemoglobin close to 8  With adequate fluid resuscitation patient's hemoglobin dropped to 7-8, which is likely closer to his baseline    B12 within normal limits  Folate significantly low at 2 3  No S/S of active bleed at this time      Plan:  -Patient's HGB dropped overnight: 7 9 -> 6 9 -> 6 8 recheck   -Ordered 1U PRBC -> recheck this PM   -continue daily folate supplementation  -monitor daily CBC  -Transfuse for Hgb <7 0

## 2023-05-02 NOTE — ASSESSMENT & PLAN NOTE
Patient with significant history of alcohol use  Daughter reports drinks from the time he wakes up to the time he sleeps, possibly consuming 8 glasses of apple kenji daily  Ethanol on presentation 45 with negative UDS and ehtylene glycol  Last drink on 4/25 prior to arrival to hospital  Patient managed in ICU with versed, precedex, and propofol and ultimately treated with phenobarbital (4/25: 150 mg, 4/26: 230/650/130/130, 4/27: 130/260, 4/28: 130), which has now been discontinued  With continued encephalopathy, patient underwent vEEG demonstrating mild diffuse cerebral dysfunction of nonspecific etiology  No electrographic seizures or interictal epileptiform discharges were seen      Plan:   -CIWA protocol ordered  -Thiamine and folate ordered  -Monitor for S/S of withdrawal  -S/P IV Thiamine

## 2023-05-02 NOTE — PROGRESS NOTES
INTERNAL MEDICINE RESIDENCY PROGRESS NOTE     Name: Odin Bui   Age & Sex: 63 y o  male   MRN: [id number]  Unit/Bed#: [facility] 901-01   Encounter: [id number]  Team: SOD Team A    PATIENT INFORMATION     Name: Odin Bui   Age & Sex: 63 y o  male   MRN: [id number]  Hospital Stay Days: 7    ASSESSMENT/PLAN     Principal Problem:    Encephalopathy  Active Problems:    Acute respiratory failure with hypoxia ([facility] )    Crohn disease ([place] [id number] )    Coronary artery disease involving native coronary artery of native heart without angina pectoris    Alcohol abuse    Cigarette nicotine dependence without complication    Macrocytic anemia    End stage COPD ([facility] )    Prediabetes    Abnormal liver function      Acute respiratory failure with hypoxia [facility])  Assessment & Plan  Patient presented with acute hypoxia and increased work of breathing  Required continuous bipap and ultimately intubated for airway protection in the setting of treating alcohol withdrawal  Sputum culture growing candida, however, per ICU sign out likely contaminant and no need to treat  Extubated on 4/28 to nasal cannula and has been titrated down to 2L  Patient's daughter reports that patient does have O2 at home, but he never uses it  Stage IV COPD based on outpatient documentation  -5/1 AM patient in increased respiratory distress, accessory muscle use on 6L NC- back on BIPAP  Plan:  -5/2 AM patient was stable with improved breathing on 10 -> 8L mid flow  Wean to NC as tolerated  -Goal to wean to ~2L-RA    -Will reach out to family regarding home O2   -Off Bipap, now on 10L midflow  -Trops, lactic WNL, Procal 1 04  -CXR 5/1: Persistent retrocardiac atelectasis/mild airspace disease   -Etiology unclear - possibly 2/2 metabolic derangements in setting of alcohol ketoacidosis vs  COPD vs  Underlying infection  -Prednisone 40 mg, S/P solumedrol    -Completed 5 days of Cefepime and Vancomycin   -Continue to titrate O2 to maintain SpO2 >88%  -Continue atrovent, performist, and pulmicort  -PRN albuterol    Abnormal liver function  Assessment & Plan  Patient presented with , ALT 66, alk phos 263, and T  bili 1 10 with unclear baseline as patient without recent CMP to evaluate  CT of the chest abdomen pelvis completed on 4/25 significant for hepatomegaly and severe hepatic steatosis  Chronic hepatitis panel completed was negative  Right upper quadrant ultrasound done on 4/27 demonstrated hepatomegaly with severe hepatic steatosis without concern for cholecystitis or signs of cirrhosis  Plan:  -Currently AST/ALT have plateaued to improving   -T  bili 2 36 -> 2 32 -> 2 07 today   -Possibly in setting of ongoing drinking with appropriate ratio  -Continue to monitor daily CMP/INR    Prediabetes  Assessment & Plan  A1c 6 2 on presentation  Plan:   -Continue SSI with POC glucose checks  -Will need outpatient evaluation by PCP    End stage COPD [facility])  Assessment & Plan  Patient maintained in the outpatient setting on albuterol and ipratropium    Plan:  -Continue Atrovent, Pulmicort, and Performist  -Prednisone taper stopped due to respiratory distress   -Now on Prednisone 40 mg daily   -Continue to titrate supplemental O2 to maintain saturation greater than 88%    Macrocytic anemia  Assessment & Plan  Hemoglobin on presentation 12 1 with unclear baseline  Previous labs done in 2018 demonstrate hemoglobin close to 8  With adequate fluid resuscitation patient's hemoglobin dropped to 7-8, which is likely closer to his baseline    B12 within normal limits  Folate significantly low at 2 3  No S/S of active bleed at this time  Plan:  -HGB 7 1 -> 8 8 -> 7 9 today   -continue daily folate supplementation  -monitor daily CBC  -Transfuse for Hgb <7 0    Cigarette nicotine dependence without complication  Assessment & Plan  Plan:  -Continue nicotine patch    Alcohol abuse  Assessment & Plan  Patient with significant history of alcohol use   Daughter reports drinks from the time he wakes up to the time he sleeps, possibly consuming 8 glasses of apple kenji daily  Ethanol on presentation 45 with negative UDS and ehtylene glycol  Last drink on 4/25 prior to arrival to hospital  Patient managed in ICU with versed, precedex, and propofol and ultimately treated with phenobarbital (4/25: 150 mg, 4/26: 230/650/130/130, 4/27: 130/260, 4/28: 130), which has now been discontinued  With continued encephalopathy, patient underwent vEEG demonstrating mild diffuse cerebral dysfunction of nonspecific etiology  No electrographic seizures or interictal epileptiform discharges were seen      Plan:   -WA protocol ordered  -Thiamine and folate ordered  -Monitor for S/S of withdrawal  -S/P IV Thiamine  Coronary artery disease involving native coronary artery of native heart without angina pectoris  Assessment & Plan  -Continue aspirin 81mg and atorvastatin 40mg daily    Crohn disease ([facility] )  Assessment & Plan  Patient with history of Crohn disease  complicated by transverse colon perforation requiring resection and ileostomy with reversal in 2018  Currently prescribed sulfasalazine in the outpatient setting, however, daughter reports patient not taking his medications at this time    Plan:  -Continue to monitor  -Will need outpatient GI versus colorectal surgery follow-up    * Encephalopathy  Assessment & Plan  Since extubation, patient has been AAOx1 only  Able to state his name, but is unable to state location, date, situation  Daughter reports he also was unable to identify her when she visited on 4/28  His baseline is AAOx4 without cognitive deficit  Etiology unclear at this time, possibly 2/2 withdrawal vs  Wernicke's vs  Metabolic abnormalities vs  anoxic injury in setting of hypotension  [facility] on presentation negative  Infectious workup has also been negative  vEEG negative  Plan:  -Improved mental status today, orientated    -Continue to monitor at this time with recent D/C of many CNS altering medications  -Delirium precautions  -Promote sleep/wake cycle  -Avoid metabolic derangements  -Continue thiamine and folate supplementation     Hyperammonemia (HCC)-resolved as of 2023  Assessment & Plan  Initial ammonia 82  Patient without history of cirrhosis  Was treated with lactulose in the ICU, which has been discontinued  No need to further trend ammonia  Shock (HCC)-resolved as of 2023  Assessment & Plan  Unclear source of shock - septic vs  Hypovolemic in setting of diarrhea  Received sepsis bolus  Was supported on levophed and vasopressin, which were discontinued today  Patient treated with 5 days of cefepime and vancomycin  ID consulted and recommended discontinuing antibiotics without clear sign/source of infection  DEBBY (acute kidney injury) (HCC)-resolved as of 2023  Assessment & Plan  Likely in setting of hypovolemia/prerenal in setting of alcohol ketoacidosis  Presented with Cr 2 87 that has improved to 1 34 today  Unclear baseline    Plan:  -Cr 1 1 -> 1 09  -Continue to monitor daily BMP  -Avoid nephrotoxic agents    High anion gap metabolic acidosis-resolved as of 2023  Assessment & Plan  Likely secondary to lactic acidosis      Disposition: Stable, wean O2 today  SUBJECTIVE     Patient seen and examined  No acute events overnight  This AM patient is oriented, endorses improved breathing from yesterday  Denies pain, N/V/C/D, ROS otherwise negative, no other concerns at this time      OBJECTIVE     Vitals:    23 0707 23 0726 23 1103 23 1107   BP: 101/70  92/65 92/62   BP Location:    Right arm   Pulse: (!) 118  (!) 109    Resp:   20    Temp:   99 2 °F (37 3 °C)    TempSrc:       SpO2: 94% 96% 91%    Weight:       Height:          Temperature:   Temp (24hrs), Av 7 °F (37 1 °C), Min:97 9 °F (36 6 °C), Max:99 4 °F (37 4 °C)    Temperature: 99 2 °F (37 3 °C)  Intake & Output:  I/O        0701   0700 05/01 0701 05/02 0700 05/02 0701  05/03 0700    P  O  580      I V  (mL/kg) 30 (0 4)      IV Piggyback 150 150     Total Intake(mL/kg) 760 (9 5) 150 (1 9)     Urine (mL/kg/hr) 2170 (1 1) 0 (0)     Stool 150 750     Total Output 2320 750     Net -1560 -600            Unmeasured Urine Occurrence 3 x 1 x         Weights:   IBW (Ideal Body Weight): 63 8 kg    Body mass index is 27 9 kg/m²  Weight (last 2 days)     Date/Time Weight    05/02/23 0600 78 4 (172 84)    05/01/23 0600 79 9 (176 15)    04/30/23 0600 77 9 (171 74)        Physical Exam  Vitals reviewed  Constitutional:       Appearance: Normal appearance  HENT:      Head: Normocephalic and atraumatic  Right Ear: External ear normal       Left Ear: External ear normal       Nose: Nose normal       Mouth/Throat:      Mouth: Mucous membranes are moist    Eyes:      Pupils: Pupils are equal, round, and reactive to light  Cardiovascular:      Rate and Rhythm: Normal rate and regular rhythm  Pulses: Normal pulses  Heart sounds: Normal heart sounds  Pulmonary:      Effort: Pulmonary effort is normal       Breath sounds: Wheezing present  Comments: B/L expiratory wheezing  Abdominal:      General: Abdomen is flat  Bowel sounds are normal  There is no distension  Palpations: Abdomen is soft  Tenderness: There is no abdominal tenderness  Musculoskeletal:      Cervical back: No rigidity or tenderness  Right lower leg: No edema  Left lower leg: No edema  Skin:     General: Skin is warm  Capillary Refill: Capillary refill takes less than 2 seconds  Neurological:      Mental Status: He is alert and oriented to person, place, and time  LABORATORY DATA     Labs: I have personally reviewed pertinent reports    Results from last 7 days   Lab Units 05/02/23  0457 05/01/23  0640 04/30/23  0933 04/30/23  0455 04/27/23  1615 04/27/23  0617 04/26/23  1355 04/26/23  0429   WBC Thousand/uL 13 66* 18 61*  --  16 93*   < > 8 81  --  10 52*   HEMOGLOBIN g/dL 7 9* 8 8* 7 1* 6 8*   < > 7 2*   < > 8 2*   HEMATOCRIT % 24 8* 27 5* 22 6* 21 6*   < > 21 3*   < > 24 3*   PLATELETS Thousands/uL 143* 141*  --  93*   < > 83*  --  74*   NEUTROS PCT %  --   --   --   --   --  91*  --  86*   MONOS PCT %  --   --   --   --   --  3*  --  7   MONO PCT %  --   --   --  6  --   --   --   --     < > = values in this interval not displayed  Results from last 7 days   Lab Units 05/02/23  0457 05/01/23  0815 04/30/23  0455   POTASSIUM mmol/L 3 8 3 9 3 9   CHLORIDE mmol/L 107 109* 112*   CO2 mmol/L 33* 30 29   BUN mg/dL 14 14 15   CREATININE mg/dL 0 99 1 09 1 10   CALCIUM mg/dL 8 4 8 1* 7 1*   ALK PHOS U/L 255* 272* 238*   ALT U/L 65 72 69   AST U/L 88* 105* 133*     Results from last 7 days   Lab Units 05/02/23  0457 05/01/23  0640 04/30/23  0455   MAGNESIUM mg/dL 1 7 2 0 1 8     Results from last 7 days   Lab Units 05/02/23  0457 05/01/23  0640 04/30/23  0455   PHOSPHORUS mg/dL 1 3* 2 0* 3 0      Results from last 7 days   Lab Units 04/28/23  0443 04/27/23  0557 04/26/23  0429   INR  1 32* 1 35* 1 30*     Results from last 7 days   Lab Units 05/01/23  0815   LACTIC ACID mmol/L 1 4           IMAGING & DIAGNOSTIC TESTING     Radiology Results: I have personally reviewed pertinent reports  XR chest portable    Result Date: 4/30/2023  Impression: Mild central vascular prominence and increase interstitial and patchy densities in the right lower lung field, as seen previously  Left pleural thickening/trace effusion  Left basilar retrocardiac atelectasis/small infiltrate  Workstation performed: [id number]     XR abdomen 1 view kub    Result Date: 4/26/2023  Impression: Nasogastric tube tip terminates at the mid stomach  Possible mild small bowel distention in the left mid abdomen, nonspecific  This can be reassessed on follow-up   Workstation performed: [id number]     XR abdomen 1 view kub    Result Date: 4/26/2023  Impression: Nasogastric tube has been inserted, tip seen passing towards the stomach, distal end not fully seen  Workstation performed: [id number]     CT head without contrast    Result Date: 4/25/2023  Impression: No acute intracranial abnormality  Workstation performed: [id number]     CTA dissection protocol chest abdomen pelvis w wo contrast    Result Date: 4/25/2023  Impression: 1  No aortic dissection, intramural hematoma, or aortic aneurysm  Patent mesenteric vessels  2  Circumferential mural thickening of the esophagus with an air-fluid level, which may relate to esophagitis and esophageal dysmotility  3  Hepatomegaly with severe hepatic steatosis  I personally discussed this study with Dr Troy Mir on 4/25/2023 1:15 PM at 4/25/2023 1:36 PM  Workstation performed: [id number]     XR chest portable ICU    Result Date: 4/30/2023  Impression: Right lower lobe patchy opacity concerning for possible infiltrate  Workstation performed: [id number]     XR chest portable ICU    Result Date: 4/28/2023  Impression: No acute cardiopulmonary disease  Lines and tubes well-positioned with no pneumothorax  Workstation performed: [id number]     XR chest portable ICU    Result Date: 4/26/2023  Impression: No active pulmonary disease  Workstation performed: [id number]     XR chest portable ICU    Result Date: 4/26/2023  Impression: Right IJ line tip has been placed, tip overlies the SVC  No active pulmonary disease  Workstation performed: [id number]     US right upper quadrant    Result Date: 4/27/2023  Impression: 1  Hepatomegaly with severe hepatic steatosis suggested  2   Visualized portions of the pancreas are normal  3   Minimal cholelithiasis  Workstation performed: [id number]     Other Diagnostic Testing: I have personally reviewed pertinent reports      ACTIVE MEDICATIONS     Current Facility-Administered Medications   Medication Dose Route Frequency   • albuterol inhalation solution 2 5 mg  2 5 mg Nebulization Q4H PRN   • budesonide "(PULMICORT) inhalation solution 0 5 mg  0 5 mg Nebulization Q12H   • enoxaparin (LOVENOX) subcutaneous injection 40 mg  40 mg Subcutaneous Daily   • folic acid 1 mg in sodium chloride 0 9 % 50 mL IVPB  1 mg Intravenous Daily   • formoterol (PERFOROMIST) nebulizer solution 20 mcg  20 mcg Nebulization Q12H   • guaiFENesin (ROBITUSSIN) oral liquid 200 mg  200 mg Oral Q4H PRN   • insulin lispro (HumaLOG) 100 units/mL subcutaneous injection 2-12 Units  2-12 Units Subcutaneous 4x Daily (AC & HS)   • ipratropium (ATROVENT) 0 02 % inhalation solution 0 5 mg  0 5 mg Nebulization TID   • levothyroxine tablet 50 mcg  50 mcg Oral Early Morning   • midodrine (PROAMATINE) tablet 10 mg  10 mg Oral TID AC   • moisture barrier miconazole 2% cream (aka ANSHU MOISTURE BARRIER ANTIFUNGAL CREAM)   Topical BID   • multivitamin stress formula tablet 1 tablet  1 tablet Oral Daily   • nicotine (NICODERM CQ) 21 mg/24 hr TD 24 hr patch 21 mg  21 mg Transdermal Daily   • [START ON 5/3/2023] pantoprazole (PROTONIX) EC tablet 40 mg  40 mg Oral Daily Before Breakfast   • predniSONE tablet 40 mg  40 mg Oral Daily   • psyllium (METAMUCIL) 1 packet  1 packet Oral Daily   • thiamine tablet 100 mg  100 mg Oral Daily       VTE Pharmacologic Prophylaxis: Enoxaparin (Lovenox)  VTE Mechanical Prophylaxis: sequential compression device    Portions of the record may have been created with voice recognition software  Occasional wrong word or \"sound a like\" substitutions may have occurred due to the inherent limitations of voice recognition software    Read the chart carefully and recognize, using context, where substitutions have occurred   ==  Jenna Lynn MD  [address] [place] [address]  Internal Medicine Residency PGY-1  "

## 2023-05-02 NOTE — PHYSICAL THERAPY NOTE
Physical Therapy Evaluation     Patient's Name: Josie Hurst    Admitting Diagnosis  Acidosis [E87 20]  Respiratory failure ([place] [address] ) [J96 90]  SOB (shortness of breath) [R06 02]  Lactate blood increase [R79 89]  Transaminitis [R74 01]  Hypoxia [R09 02]  DEBBY (acute kidney injury) ([place] [address] ) [Q87 6]  Alcoholic cirrhosis of liver without ascites ([place] [address] ) [K70 30]  High anion gap metabolic acidosis [G23 85]    Problem List  Patient Active Problem List   Diagnosis    Crohn disease ([place] [address] )    Spinal stenosis of cervical region    Pericarditis    Coronary artery disease involving native coronary artery of native heart without angina pectoris    Atherosclerosis of coronary artery    Stage 4 very severe COPD by GOLD classification ([place] [address] )    Dyslipidemia    Alcohol abuse    Cigarette nicotine dependence without complication    Agitation    Heart failure, systolic, due to idiopathic cardiomyopathy (HCC)    Moderate protein-calorie malnutrition (HCC)    Macrocytic anemia    Hypocalcemia    Perforation of colon (HCC)    Hypotension    Encephalopathy    Presence of drug-eluting stent in right coronary artery    Paroxysmal atrial fibrillation (HCC)    Hypercalcemia    Hematemesis with nausea    Status post reversal of ileostomy    Screening for diabetes mellitus    Acute respiratory failure with hypoxia (HCC)    End stage COPD ([place] [address] )    Prediabetes    Abnormal liver function       Past Medical History  Past Medical History:   Diagnosis Date    Anxiety     Asthma     Cardiac disease     Cervical stenosis (uterine cervix)     Chest pain     Chronic kidney disease     Colitis     Colon polyps     COPD (chronic obstructive pulmonary disease) (HCC)     2L @ HS and PRN    Coronary artery disease     Diverticulitis     Esophageal reflux     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HCC)     Hyperlipidemia     Hypertension     IBD (inflammatory bowel disease)     Myocardial infarction [facility])     Old myocardial infarction     Perforation of colon ([place] [address] )     Type 2 diabetes, diet controlled ([facility] )     Ulcerative colitis ([facility] [address] )        Past Surgical History  Past Surgical History:   Procedure Laterality Date    ANGIOPLASTY      APPENDECTOMY      COLON SURGERY      COLONOSCOPY N/A 10/24/2016    Procedure: COLONOSCOPY;  Surgeon: Leopoldo Modi MD;  [facility]; Service:     CORONARY ANGIOPLASTY WITH STENT PLACEMENT      ESOPHAGOGASTRODUODENOSCOPY N/A 10/24/2016    Procedure: ESOPHAGOGASTRODUODENOSCOPY (EGD); Surgeon: Leopoldo Modi MD;  [facility]; Service:     EXPLORATORY LAPAROTOMY W/ BOWEL RESECTION N/A 5/22/2018    Procedure: EXPLORATORY LAPAROTOMY, ILIOCOLECTOMY, ILIOCOLONIC ANASTAMOSIS, LOOP ILIOSTOMY, REPAIR OF SEROSAL TEAR, EXTENSIVE LYSIS OF ADHESIONS, WOUND VAC PLACEMENT;  Surgeon: Leopoldo Modi MD;  [facility];  Service: Colorectal    HEMICOLECTOMY      ILEOSTOMY CLOSURE N/A 10/5/2018    Procedure: [place];  Surgeon: Leopoldo Modi MD;  [facility];  Service: Colorectal    OTHER SURGICAL HISTORY      stent indications acute myocardial infarction    MD COLONOSCOPY FLX DX W/COLLJ SPEC WHEN PFRMD N/A 2/6/2018    Procedure: COLONOSCOPY;  Surgeon: Leopoldo Modi MD;  [facility]; Service: Colorectal    MD COLONOSCOPY FLX DX W/COLLJ SPEC WHEN PFRMD N/A 10/4/2018    Procedure: COLONOSCOPY;  Surgeon: Leopoldo Modi MD;  [facility];   Service: Colorectal    TONSILLECTOMY          05/02/23 0903   PT Last Visit   PT Visit Date 05/02/23   Note Type   Note type Evaluation   Pain Assessment   Pain Assessment Tool FLACC   Pain Rating: FLACC (Rest) - Face 0   Pain Rating: FLACC (Rest) - Legs 0   Pain Rating: FLACC (Rest) - Activity 0   Pain Rating: FLACC (Rest) - Cry 0   Pain Rating: FLACC (Rest) - Consolability 0   Score: FLACC (Rest) 0   Pain Rating: FLACC (Activity) - Face 0   Pain Rating: FLACC (Activity) - Legs 0   Pain Rating: FLACC (Activity) - Activity 0   Pain Rating: FLACC (Activity) - Cry 0   Pain Rating: FLACC (Activity) - Consolability 0   Score: FLACC (Activity) 0   Restrictions/Precautions   Weight Bearing Precautions Per Order No   Other Precautions Cognitive; Chair Alarm; Bed Alarm;Multiple lines; Fall Risk;O2;Telemetry  (rectal tube)   Home Living   Type of Home House   Home Layout Two level  (sleeps on recliner on 1st floor- difficutly ascending steps per chart review)   Bathroom Equipment Commode   Additional Comments pt poor historian, information obtained from chart review   Prior Function   Level of [place] Independent with ADLs; Independent with functional mobility   Lives With Family;Daughter  (daughter + 3 grandchildren)   Receives Help From [employer] Retired   General   Family/Caregiver Present No   Cognition   Overall Cognitive Status Impaired   Orientation Level Oriented to person   Memory Decreased recall of precautions;Decreased recall of recent events   Following Commands Follows one step commands with increased time or repetition   Comments pt with flat affect, anxious  increased time required to complete all tasks/ commands   RLE Assessment   RLE Assessment   (functionally 3/5)   LLE Assessment   LLE Assessment   (functionally 3/5)   Bed Mobility   Supine to Sit 3  Moderate assistance   Additional items Assist x 2;HOB elevated; Bedrails; Increased time required;Verbal cues;LE management   Sit to Supine 3  Moderate assistance   Additional items Assist x 1; Increased time required;Verbal cues;LE management   Additional Comments pt supine in bed upon arrival  Pt returned to supine in bed with all needs within reach   Transfers   Sit to Stand Unable to assess   Stand to Sit Unable to assess   Additional Comments pt declining functional transfers, PT to continue to follow and assess as able     Ambulation/Elevation   Gait pattern Not tested   Balance   Static Sitting Fair -   Dynamic Sitting Poor +   Endurance Deficit   Endurance Deficit Yes   Activity Tolerance Activity Tolerance Patient limited by fatigue   Medical Staff Made Aware OT; co-eval performed this date 2* increased medical complexity and multiple co-morbidities   Nurse Made Aware RN cleared pt to participate in therapy session   Assessment   Prognosis Fair   Problem List Decreased strength;Decreased range of motion;Decreased endurance; Impaired balance;Decreased mobility; Decreased cognition; Impaired judgement;Decreased coordination;Decreased safety awareness;Pain   Assessment Pt is a 63 y o  male seen for PT evaluation s/p admit to [facility] [place] on [place]25/2023  Pt was admitted with a primary dx of: encepholapthy  PT now consulted for assessment of mobility and d/c needs  Pt with Up with assistance orders  Pts current comorbidities effecting treatment include: Chron disease, CAD, alcohol abuse, anemia, acute respiratory failure, end stage COPD, prediabetes  Pts current clinical presentation is Unstable/ Unpredictable (high complexity) due to Ongoing medical management for primary dx, Increased reliance on more restrictive AD compared to baseline, Decreased activity tolerance compared to baseline, Fall risk, Increased assistance needed from caregiver at current time, Ongoing telemetry monitoring, Cog status, Increased O2 via NC from pts baseline, Trending lab values, Continuous pulse oximetry monitoring   Prior to admission, pt was residing with daughter + grandchildren in [address] [place] [address], was independent without AD use per chart review  Upon evaluation, pt currently is requiring mod Ax1-2 for bed mobility; PT to continue to follow and assess functional transfers + ambulation as appropriate and able   Pt presents at PT eval functioning below baseline and currently w/ overall mobility deficits 2* to: BLE weakness, decreased ROM, impaired balance, decreased endurance, impaired coordination, gait deviations, pain, decreased activity tolerance compared to baseline, decreased functional mobility tolerance compared to baseline, decreased safety awareness, impaired judgement, fall risk, decreased cognition  Pt currently at a fall risk 2* to impairments listed above  Pt will continue to benefit from skilled acute PT interventions to address stated impairments; to maximize functional mobility; for ongoing pt/ family training; and DME needs  At conclusion of PT session pt returned BTB with phone and call bell within reach  Pt denies any further questions at this time  The patient's AM-PAC Basic Mobility Inpatient Short Form Raw Score is 9  A Raw score of less than or equal to 16 suggests the patient may benefit from discharge to post-acute rehabilitation services  Please also refer to the recommendation of the Physical Therapist for safe discharge planning  Recommend rehab upon hospital D/C  Barriers to Discharge Inaccessible home environment   Goals   Patient Goals none stated   STG Expiration Date 05/16/23   Short Term Goal #1 STG 1  Pt will be able to perform bed mobility tasks with mod I level in order to improve overall functional mobility and assist in safe d/c  STG 2  Pt with sit EOB for at least 25 minutes at mod I level in order to strengthen abdominal musculature and assist in future transfers/ ambulation  STG 3  Pt will improve sitting/standing static/dynamic balance 1/2 grade in order to improve functional mobility and assist in safe d/c  STG 4  Pt will improve LE strength by 1/2 grade in order to improve functional mobility and assist in safe d/c  *PT to continue to follow and assess as appropriate and able  PT Treatment Day 0   Plan   Treatment/Interventions ADL retraining;Functional transfer training;LE strengthening/ROM; Elevations; Therapeutic exercise; Endurance training;Patient/family training;Cognitive reorientation;Equipment eval/education; Bed mobility;Gait training;Spoke to nursing;Spoke to case management;OT   PT Frequency 2-3x/wk   Recommendation   PT Discharge Recommendation Post acute rehabilitation services   AM-PAC Basic Mobility Inpatient   Turning in Flat Bed Without Bedrails 3   Lying on Back to Sitting on Edge of Flat Bed Without Bedrails 2   Moving Bed to Chair 1   Standing Up From Chair Using Arms 1   Walk in Room 1   Climb 3-5 Stairs With Railing 1   Basic Mobility Inpatient Raw Score 9   Highest Level Of Mobility   JH-HLM Goal 3: Sit at edge of bed   JH-HLM Achieved 3: Sit at edge of bed   Modified Jasper Scale   Modified Omar Scale 4           Reyna Johann, PT DPT

## 2023-05-02 NOTE — PLAN OF CARE
Problem: Nutrition/Hydration-ADULT  Goal: Nutrient/Hydration intake appropriate for improving, restoring or maintaining nutritional needs  Description: Monitor and assess patient's nutrition/hydration status for malnutrition  Collaborate with interdisciplinary team and initiate plan and interventions as ordered  Monitor patient's weight and dietary intake as ordered or per policy  Utilize nutrition screening tool and intervene as necessary  Determine patient's food preferences and provide high-protein, high-caloric foods as appropriate       INTERVENTIONS:  - Monitor oral intake, urinary output, labs, and treatment plans  - Assess nutrition and hydration status and recommend course of action  - Evaluate amount of meals eaten  - Assist patient with eating if necessary   - Allow adequate time for meals  - Recommend/ encourage appropriate diets, oral nutritional supplements, and vitamin/mineral supplements  - Order, calculate, and assess calorie counts as needed  - Recommend, monitor, and adjust tube feedings and TPN/PPN based on assessed needs  - Assess need for intravenous fluids  - Provide specific nutrition/hydration education as appropriate  - Include patient/family/caregiver in decisions related to nutrition  Outcome: Progressing  Note: RD to schedule oral nutrition supplements

## 2023-05-03 LAB
ABO GROUP BLD: NORMAL
ALBUMIN SERPL BCP-MCNC: 2.5 G/DL (ref 3.5–5)
ALP SERPL-CCNC: 222 U/L (ref 46–116)
ALT SERPL W P-5'-P-CCNC: 52 U/L (ref 12–78)
ANION GAP SERPL CALCULATED.3IONS-SCNC: 3 MMOL/L (ref 4–13)
ANION GAP SERPL CALCULATED.3IONS-SCNC: 4 MMOL/L (ref 4–13)
AST SERPL W P-5'-P-CCNC: 60 U/L (ref 5–45)
BILIRUB SERPL-MCNC: 2.09 MG/DL (ref 0.2–1)
BLD GP AB SCN SERPL QL: NEGATIVE
BUN SERPL-MCNC: 12 MG/DL (ref 5–25)
BUN SERPL-MCNC: 12 MG/DL (ref 5–25)
CA-I BLD-SCNC: 0.96 MMOL/L (ref 1.12–1.32)
CALCIUM ALBUM COR SERPL-MCNC: 9 MG/DL (ref 8.3–10.1)
CALCIUM SERPL-MCNC: 7.8 MG/DL (ref 8.3–10.1)
CALCIUM SERPL-MCNC: 7.8 MG/DL (ref 8.3–10.1)
CHLORIDE SERPL-SCNC: 104 MMOL/L (ref 96–108)
CHLORIDE SERPL-SCNC: 104 MMOL/L (ref 96–108)
CO2 SERPL-SCNC: 30 MMOL/L (ref 21–32)
CO2 SERPL-SCNC: 30 MMOL/L (ref 21–32)
CREAT SERPL-MCNC: 0.84 MG/DL (ref 0.6–1.3)
CREAT SERPL-MCNC: 0.87 MG/DL (ref 0.6–1.3)
ERYTHROCYTE [DISTWIDTH] IN BLOOD BY AUTOMATED COUNT: 16.2 % (ref 11.6–15.1)
GFR SERPL CREATININE-BSD FRML MDRD: 93 ML/MIN/1.73SQ M
GFR SERPL CREATININE-BSD FRML MDRD: 95 ML/MIN/1.73SQ M
GLUCOSE SERPL-MCNC: 106 MG/DL (ref 65–140)
GLUCOSE SERPL-MCNC: 114 MG/DL (ref 65–140)
GLUCOSE SERPL-MCNC: 165 MG/DL (ref 65–140)
GLUCOSE SERPL-MCNC: 260 MG/DL (ref 65–140)
GLUCOSE SERPL-MCNC: 98 MG/DL (ref 65–140)
GLUCOSE SERPL-MCNC: 98 MG/DL (ref 65–140)
HCT VFR BLD AUTO: 21.3 % (ref 36.5–49.3)
HCT VFR BLD AUTO: 21.5 % (ref 36.5–49.3)
HCT VFR BLD AUTO: 22.9 % (ref 36.5–49.3)
HCT VFR BLD AUTO: 25.9 % (ref 36.5–49.3)
HGB BLD-MCNC: 6.8 G/DL (ref 12–17)
HGB BLD-MCNC: 6.9 G/DL (ref 12–17)
HGB BLD-MCNC: 7.2 G/DL (ref 12–17)
HGB BLD-MCNC: 8.2 G/DL (ref 12–17)
MAGNESIUM SERPL-MCNC: 1.9 MG/DL (ref 1.6–2.6)
MAGNESIUM SERPL-MCNC: 1.9 MG/DL (ref 1.6–2.6)
MCH RBC QN AUTO: 33.8 PG (ref 26.8–34.3)
MCHC RBC AUTO-ENTMCNC: 32.4 G/DL (ref 31.4–37.4)
MCV RBC AUTO: 104 FL (ref 82–98)
NRBC BLD AUTO-RTO: 0 /100 WBCS
PHOSPHATE SERPL-MCNC: 2.5 MG/DL (ref 2.3–4.1)
PLATELET # BLD AUTO: 172 THOUSANDS/UL (ref 149–390)
PMV BLD AUTO: 10.5 FL (ref 8.9–12.7)
POTASSIUM SERPL-SCNC: 3.6 MMOL/L (ref 3.5–5.3)
POTASSIUM SERPL-SCNC: 3.8 MMOL/L (ref 3.5–5.3)
PROT SERPL-MCNC: 5 G/DL (ref 6.4–8.4)
RBC # BLD AUTO: 2.04 MILLION/UL (ref 3.88–5.62)
RH BLD: POSITIVE
SODIUM SERPL-SCNC: 137 MMOL/L (ref 135–147)
SODIUM SERPL-SCNC: 138 MMOL/L (ref 135–147)
SPECIMEN EXPIRATION DATE: NORMAL
WBC # BLD AUTO: 10.44 THOUSAND/UL (ref 4.31–10.16)

## 2023-05-03 PROCEDURE — 30233N1 TRANSFUSION OF NONAUTOLOGOUS RED BLOOD CELLS INTO PERIPHERAL VEIN, PERCUTANEOUS APPROACH: ICD-10-PCS | Performed by: STUDENT IN AN ORGANIZED HEALTH CARE EDUCATION/TRAINING PROGRAM

## 2023-05-03 RX ORDER — POTASSIUM CHLORIDE 20MEQ/15ML
40 LIQUID (ML) ORAL ONCE
Status: COMPLETED | OUTPATIENT
Start: 2023-05-03 | End: 2023-05-03

## 2023-05-03 RX ORDER — MAGNESIUM SULFATE HEPTAHYDRATE 40 MG/ML
2 INJECTION, SOLUTION INTRAVENOUS ONCE
Status: COMPLETED | OUTPATIENT
Start: 2023-05-03 | End: 2023-05-03

## 2023-05-03 RX ORDER — LOPERAMIDE HCL 1 MG/7.5ML
2 SUSPENSION ORAL 2 TIMES DAILY
Status: DISCONTINUED | OUTPATIENT
Start: 2023-05-03 | End: 2023-05-06 | Stop reason: HOSPADM

## 2023-05-03 RX ADMIN — NICOTINE 21 MG: 21 PATCH, EXTENDED RELEASE TRANSDERMAL at 10:38

## 2023-05-03 RX ADMIN — IPRATROPIUM BROMIDE 0.5 MG: 0.5 SOLUTION RESPIRATORY (INHALATION) at 20:04

## 2023-05-03 RX ADMIN — BUDESONIDE 0.5 MG: 0.5 INHALANT ORAL at 07:04

## 2023-05-03 RX ADMIN — MICONAZOLE NITRATE: 20 CREAM TOPICAL at 17:36

## 2023-05-03 RX ADMIN — LEVALBUTEROL HYDROCHLORIDE 1.25 MG: 1.25 SOLUTION RESPIRATORY (INHALATION) at 13:12

## 2023-05-03 RX ADMIN — LEVOTHYROXINE SODIUM 50 MCG: 50 TABLET ORAL at 10:37

## 2023-05-03 RX ADMIN — IPRATROPIUM BROMIDE 0.5 MG: 0.5 SOLUTION RESPIRATORY (INHALATION) at 13:12

## 2023-05-03 RX ADMIN — POTASSIUM CHLORIDE 40 MEQ: 1.5 SOLUTION ORAL at 15:03

## 2023-05-03 RX ADMIN — INSULIN LISPRO 2 UNITS: 100 INJECTION, SOLUTION INTRAVENOUS; SUBCUTANEOUS at 12:22

## 2023-05-03 RX ADMIN — IPRATROPIUM BROMIDE 0.5 MG: 0.5 SOLUTION RESPIRATORY (INHALATION) at 07:03

## 2023-05-03 RX ADMIN — LOPERAMIDE HCL 2 MG: 1 SOLUTION ORAL at 20:55

## 2023-05-03 RX ADMIN — B-COMPLEX W/ C & FOLIC ACID TAB 1 TABLET: TAB at 10:37

## 2023-05-03 RX ADMIN — FOLIC ACID 1 MG: 5 INJECTION, SOLUTION INTRAMUSCULAR; INTRAVENOUS; SUBCUTANEOUS at 10:36

## 2023-05-03 RX ADMIN — LOPERAMIDE HCL 2 MG: 1 SOLUTION ORAL at 15:03

## 2023-05-03 RX ADMIN — MICONAZOLE NITRATE: 20 CREAM TOPICAL at 10:38

## 2023-05-03 RX ADMIN — Medication 1 PACKET: at 10:37

## 2023-05-03 RX ADMIN — MIDODRINE HYDROCHLORIDE 10 MG: 5 TABLET ORAL at 17:33

## 2023-05-03 RX ADMIN — LEVALBUTEROL HYDROCHLORIDE 1.25 MG: 1.25 SOLUTION RESPIRATORY (INHALATION) at 20:04

## 2023-05-03 RX ADMIN — BUDESONIDE 0.5 MG: 0.5 INHALANT ORAL at 20:04

## 2023-05-03 RX ADMIN — LEVALBUTEROL HYDROCHLORIDE 1.25 MG: 1.25 SOLUTION RESPIRATORY (INHALATION) at 07:03

## 2023-05-03 RX ADMIN — PANTOPRAZOLE SODIUM 40 MG: 40 TABLET, DELAYED RELEASE ORAL at 06:04

## 2023-05-03 RX ADMIN — MIDODRINE HYDROCHLORIDE 10 MG: 5 TABLET ORAL at 06:04

## 2023-05-03 RX ADMIN — FORMOTEROL FUMARATE DIHYDRATE 20 MCG: 20 SOLUTION RESPIRATORY (INHALATION) at 20:04

## 2023-05-03 RX ADMIN — INSULIN LISPRO 6 UNITS: 100 INJECTION, SOLUTION INTRAVENOUS; SUBCUTANEOUS at 17:33

## 2023-05-03 RX ADMIN — ENOXAPARIN SODIUM 40 MG: 40 INJECTION SUBCUTANEOUS at 10:36

## 2023-05-03 RX ADMIN — FORMOTEROL FUMARATE DIHYDRATE 20 MCG: 20 SOLUTION RESPIRATORY (INHALATION) at 07:03

## 2023-05-03 RX ADMIN — PREDNISONE 40 MG: 20 TABLET ORAL at 10:37

## 2023-05-03 RX ADMIN — THIAMINE HCL TAB 100 MG 100 MG: 100 TAB at 10:37

## 2023-05-03 RX ADMIN — MIDODRINE HYDROCHLORIDE 10 MG: 5 TABLET ORAL at 10:37

## 2023-05-03 RX ADMIN — MAGNESIUM SULFATE HEPTAHYDRATE 2 G: 40 INJECTION, SOLUTION INTRAVENOUS at 15:03

## 2023-05-03 NOTE — NURSING NOTE
Pt continues to remove Bipap and O2 levels drop to low 80%  Notified RT and pt returned to midflow  Now O2 levels presently 92%

## 2023-05-03 NOTE — SPEECH THERAPY NOTE
Speech Language/Pathology    Speech/Language Pathology Progress Note    Patient Name: Natalie Rivera  Today's Date: 5/3/2023     Problem List  Principal Problem:    Encephalopathy  Active Problems:    Crohn disease ([facility] )    Coronary artery disease involving native coronary artery of native heart without angina pectoris    Alcohol abuse    Cigarette nicotine dependence without complication    Macrocytic anemia    Acute respiratory failure with hypoxia (HCC)    End stage COPD ([facility] [address] )    Prediabetes    Abnormal liver function       Past Medical History  Past Medical History:   Diagnosis Date   • Anxiety    • Asthma    • Cardiac disease    • Cervical stenosis (uterine cervix)    • Chest pain    • Chronic kidney disease    • Colitis    • Colon polyps    • COPD (chronic obstructive pulmonary disease) (HCC)     2L @ HS and PRN   • Coronary artery disease    • Diverticulitis    • Esophageal reflux    • 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HCC)    • Hyperlipidemia    • Hypertension    • IBD (inflammatory bowel disease)    • Myocardial infarction [facility])    • Old myocardial infarction    • Perforation of colon ([place] [id number] )    • Type 2 diabetes, diet controlled ([place] [address] )    • Ulcerative colitis ([place] [address] )         Past Surgical History  Past Surgical History:   Procedure Laterality Date   • ANGIOPLASTY     • APPENDECTOMY     • COLON SURGERY     • COLONOSCOPY N/A 10/24/2016    Procedure: COLONOSCOPY;  Surgeon: Cary Sanchez MD;  [facility]; Service:    • CORONARY ANGIOPLASTY WITH STENT PLACEMENT     • ESOPHAGOGASTRODUODENOSCOPY N/A 10/24/2016    Procedure: ESOPHAGOGASTRODUODENOSCOPY (EGD); Surgeon: Cary Sanchez MD;  [facility];   Service:    • EXPLORATORY LAPAROTOMY W/ BOWEL RESECTION N/A 5/22/2018    Procedure: EXPLORATORY LAPAROTOMY, ILIOCOLECTOMY, ILIOCOLONIC ANASTAMOSIS, LOOP ILIOSTOMY, REPAIR OF SEROSAL TEAR, EXTENSIVE LYSIS OF ADHESIONS, WOUND VAC PLACEMENT;  Surgeon: Cary Sanchez MD;  [facility];  Service: "Colorectal   • HEMICOLECTOMY     • ILEOSTOMY CLOSURE N/A 10/5/2018    Procedure: Dania Michelle;  Surgeon: Munira Champion MD;  [facility];  Service: Colorectal   • OTHER SURGICAL HISTORY      stent indications acute myocardial infarction   • CT COLONOSCOPY FLX DX W/COLLJ SPEC WHEN PFRMD N/A 2/6/2018    Procedure: COLONOSCOPY;  Surgeon: Munira Champion MD;  [facility]; Service: Colorectal   • CT COLONOSCOPY FLX DX W/COLLJ SPEC WHEN PFRMD N/A 10/4/2018    Procedure: COLONOSCOPY;  Surgeon: Munira Champion MD;  [facility]; Service: Colorectal   • TONSILLECTOMY           Subjective:  Pt awake and alert  Initially refused po but stated he \"would give it a try\"  Current diet: puree/honey  Objective:  Pt seen for ongoing tx of dysphagia  Pt seen w/ lunch tray of level 1/ht materials and trials of upgraded solids (javier crackers) and liquids (nt, thin)  Pt self fed puree from tray w/o difficulty  Retrieval of nt by straw was effective, pt had to be paced to take smaller sips  No overt s/s of aspiration observed  Transfer appeared prompt  Pt impulsive w/ straw  Trials of thin by straw were coughed on significantly  Despite pacing/pinching straw, pt took large sips of thin and appears to have lost control  Pt requested more thins despite apparent s/s aspiration and explanation of what coughing likely meant  Delayed swallow initiation suspected  Mastication of javier cracker trials was effective w/ mild oral residue noted  Pt states he has no upper teeth, which limits mastication  Assessment:  Concern for impulsivity/ large sips w/ upgraded liquids  Would like VBS before any diet upgrades d/t potential for silent aspiration   Will complete when pt is appropriate    Plan/Recommendations:  Continue level 1/ ht diet  Continue frequent oral care  ST to follow        "

## 2023-05-03 NOTE — PROGRESS NOTES
INTERNAL MEDICINE RESIDENCY PROGRESS NOTE     Name: Lori Tom   Age & Sex: 63 y o  male   MRN: [id number]  Unit/Bed#: [facility] 901-01   Encounter: [id number]  Team: SOD Team A    PATIENT INFORMATION     Name: Lori Tom   Age & Sex: 63 y o  male   MRN: [id number]  Hospital Stay Days: 8    ASSESSMENT/PLAN     Principal Problem:    Encephalopathy  Active Problems:    Acute respiratory failure with hypoxia (HCC)    Macrocytic anemia    Crohn disease ([facility] )    Coronary artery disease involving native coronary artery of native heart without angina pectoris    Alcohol abuse    Cigarette nicotine dependence without complication    End stage COPD ([facility] )    Prediabetes    Abnormal liver function      Acute respiratory failure with hypoxia [facility])  Assessment & Plan  Patient presented with acute hypoxia and increased work of breathing  Required continuous bipap and ultimately intubated for airway protection in the setting of treating alcohol withdrawal  Sputum culture growing candida, however, per ICU sign out likely contaminant and no need to treat  Extubated on 4/28 to nasal cannula and has been titrated down to 2L  Patient's daughter reports that patient does have O2 at home, but he never uses it  Stage IV COPD based on outpatient documentation  -5/1 AM patient in increased respiratory distress, accessory muscle use on 6L NC- back on BIPAP      Plan:  -5/3 AM patient on 10 -> 8 -> 6L NC   -Goal to wean to ~2L-room air    -Will reach out to family regarding home O2   -Bipap at night    -Trops, lactic WNL, Procal 1 04  -CXR 5/1: Persistent retrocardiac atelectasis/mild airspace disease   -Etiology unclear - possibly 2/2 metabolic derangements in setting of alcohol ketoacidosis vs  COPD vs  Underlying infection  -Prednisone 40 mg, S/P solumedrol    -Completed 5 days of Cefepime and Vancomycin   -Continue to titrate O2 to maintain SpO2 >88%  -Continue atrovent, performist, and pulmicort  -PRN albuterol    Macrocytic of ongoing drinking with appropriate ratio  -Continue to monitor daily CMP/INR    Prediabetes  Assessment & Plan  A1c 6 2 on presentation  Plan:   -Continue SSI with POC glucose checks  -Will need outpatient evaluation by PCP    End stage COPD ([facility] )  Assessment & Plan  Patient maintained in the outpatient setting on albuterol and ipratropium    Plan:  -Continue Atrovent, Pulmicort, and Performist  -Prednisone taper stopped due to respiratory distress   -Now on Prednisone 40 mg daily   -Continue to titrate supplemental O2 to maintain saturation greater than 88%    Cigarette nicotine dependence without complication  Assessment & Plan  Plan:  -Continue nicotine patch    Alcohol abuse  Assessment & Plan  Patient with significant history of alcohol use  Daughter reports drinks from the time he wakes up to the time he sleeps, possibly consuming 8 glasses of apple kenji daily  Ethanol on presentation 45 with negative UDS and ehtylene glycol  Last drink on 4/25 prior to arrival to hospital  Patient managed in ICU with versed, precedex, and propofol and ultimately treated with phenobarbital (4/25: 150 mg, 4/26: 230/650/130/130, 4/27: 130/260, 4/28: 130), which has now been discontinued  With continued encephalopathy, patient underwent vEEG demonstrating mild diffuse cerebral dysfunction of nonspecific etiology  No electrographic seizures or interictal epileptiform discharges were seen      Plan:   -WA protocol ordered  -Thiamine and folate ordered  -Monitor for S/S of withdrawal  -S/P IV Thiamine  Coronary artery disease involving native coronary artery of native heart without angina pectoris  Assessment & Plan  -Continue aspirin 81mg and atorvastatin 40mg daily    Crohn disease ([facility] )  Assessment & Plan  Patient with history of Crohn disease  complicated by transverse colon perforation requiring resection and ileostomy with reversal in 2018    Currently prescribed sulfasalazine in the outpatient setting, however, daughter reports patient not taking his medications at this time    Plan:  -Continue to monitor  -Will need outpatient GI versus colorectal surgery follow-up    Hyperammonemia (HCC)-resolved as of 4/29/2023  Assessment & Plan  Initial ammonia 82  Patient without history of cirrhosis  Was treated with lactulose in the ICU, which has been discontinued  No need to further trend ammonia  Shock (HCC)-resolved as of 4/29/2023  Assessment & Plan  Unclear source of shock - septic vs  Hypovolemic in setting of diarrhea  Received sepsis bolus  Was supported on levophed and vasopressin, which were discontinued today  Patient treated with 5 days of cefepime and vancomycin  ID consulted and recommended discontinuing antibiotics without clear sign/source of infection  DEBBY (acute kidney injury) (HCC)-resolved as of 5/1/2023  Assessment & Plan  Likely in setting of hypovolemia/prerenal in setting of alcohol ketoacidosis  Presented with Cr 2 87 that has improved to 1 34 today  Unclear baseline    Plan:  -Cr 1 1 -> 1 09  -Continue to monitor daily BMP  -Avoid nephrotoxic agents    High anion gap metabolic acidosis-resolved as of 4/29/2023  Assessment & Plan  Likely secondary to lactic acidosis      Disposition: Patient's HgB is trending due to overnight drop to 6 8 from 7 9 g/dL  Patient was transfused with Leukoreduced RBC today at 12:06pm  Patient is on 6 L/min mid flow nasal cannula and is currently being weaned down  SUBJECTIVE     Patient seen and examined  No acute events overnight  This AM patient is oriented and eating  Patient endorses nonproductive cough  Denies blood in urine and none seen in feces  Denies pain, N/V/D/ ROS otherwise negative, no other concerns at this time      OBJECTIVE     Vitals:    05/03/23 1057 05/03/23 1100 05/03/23 1204 05/03/23 1220   BP: (!) 86/55 96/60 94/59 92/63   BP Location:  Right arm     Pulse: 104  (!) 107 (!) 110   Resp: 18  18 18   Temp: 98 8 °F (37 1 °C)  98 1 °F (36 7 °C) 98 °F (36 7 °C)   TempSrc:       SpO2: 92%  94% 95%   Weight:       Height:          Temperature:   Temp (24hrs), Av 2 °F (36 8 °C), Min:97 7 °F (36 5 °C), Max:99 °F (37 2 °C)    Temperature: 98 °F (36 7 °C)  Intake & Output:  I/O       / 0701  05/ 0700 05/ 0701  05/03 0700 05/ 0701  05/04 0700    P  O   0     I V  (mL/kg)       IV Piggyback 150      Total Intake(mL/kg) 150 (1 9) 0 (0)     Urine (mL/kg/hr) 0 (0) 0 (0)     Stool 750 1500     Total Output 750 1500     Net -600 -1500            Unmeasured Urine Occurrence 1 x 2 x         Weights:   IBW (Ideal Body Weight): 63 8 kg    Body mass index is 27 75 kg/m²  Weight (last 2 days)     Date/Time Weight    23 0444 78 (171 96)    23 0600 78 4 (172 84)    23 0600 79 9 (176 15)        Physical Exam  Vitals reviewed  Constitutional:       General: He is not in acute distress  Appearance: He is normal weight  He is not ill-appearing, toxic-appearing or diaphoretic  HENT:      Head: Normocephalic and atraumatic  Nose: No congestion  Eyes:      General: No scleral icterus  Right eye: Discharge present  Left eye: Discharge present  Comments: Patient was teary-eyed  Cardiovascular:      Rate and Rhythm: Normal rate and regular rhythm  Pulses: Normal pulses  Heart sounds: Normal heart sounds  No murmur heard  No friction rub  No gallop  Pulmonary:      Effort: No respiratory distress  Breath sounds: Normal breath sounds  No stridor  No rhonchi or rales  Comments: Slight wheezes in the upper right lobe  Chest:      Chest wall: No tenderness  Abdominal:      General: Bowel sounds are normal       Tenderness: There is no abdominal tenderness  There is no guarding or rebound  Skin:     General: Skin is warm and dry  Coloration: Skin is not jaundiced  Findings: Bruising present  Neurological:      General: No focal deficit present        Mental Status: He is alert and oriented to person, place, and time  Psychiatric:         Mood and Affect: Mood normal          Behavior: Behavior normal          Thought Content: Thought content normal          Judgment: Judgment normal        LABORATORY DATA     Labs: I have personally reviewed pertinent reports  Results from last 7 days   Lab Units 05/03/23  0928 05/03/23  0606 05/03/23 0438 05/02/23 0457 05/01/23  0640 04/30/23  0933 04/30/23  0455 04/27/23  1615 04/27/23  0617   WBC Thousand/uL  --   --  10 44* 13 66* 18 61*  --  16 93*   < > 8 81   HEMOGLOBIN g/dL 7 2* 6 8* 6 9* 7 9* 8 8*   < > 6 8*   < > 7 2*   HEMATOCRIT % 22 9* 21 5* 21 3* 24 8* 27 5*   < > 21 6*   < > 21 3*   PLATELETS Thousands/uL  --   --  172 143* 141*  --  93*   < > 83*   NEUTROS PCT %  --   --   --   --   --   --   --   --  91*   MONOS PCT %  --   --   --   --   --   --   --   --  3*   MONO PCT %  --   --   --   --   --   --  6  --   --     < > = values in this interval not displayed  Results from last 7 days   Lab Units 05/03/23 0928 05/03/23 0438 05/02/23 0457 05/01/23  0815   POTASSIUM mmol/L 3 8 3 6 3 8 3 9   CHLORIDE mmol/L 104 104 107 109*   CO2 mmol/L 30 30 33* 30   BUN mg/dL 12 12 14 14   CREATININE mg/dL 0 87 0 84 0 99 1 09   CALCIUM mg/dL 7 8* 7 8* 8 4 8 1*   ALK PHOS U/L  --  222* 255* 272*   ALT U/L  --  52 65 72   AST U/L  --  60* 88* 105*     Results from last 7 days   Lab Units 05/03/23 0928 05/03/23 0438 05/02/23  0457   MAGNESIUM mg/dL 1 9 1 9 1 7     Results from last 7 days   Lab Units 05/03/23 0438 05/02/23 0457 05/01/23  0640   PHOSPHORUS mg/dL 2 5 1 3* 2 0*      Results from last 7 days   Lab Units 04/28/23  0443 04/27/23  0557   INR  1 32* 1 35*     Results from last 7 days   Lab Units 05/01/23  0815   LACTIC ACID mmol/L 1 4           IMAGING & DIAGNOSTIC TESTING     Radiology Results: I have personally reviewed pertinent reports    XR chest portable    Result Date: 4/30/2023  Impression: Mild central vascular prominence and increase interstitial and patchy densities in the right lower lung field, as seen previously  Left pleural thickening/trace effusion  Left basilar retrocardiac atelectasis/small infiltrate  Workstation performed: [id number]     XR abdomen 1 view kub    Result Date: 4/26/2023  Impression: Nasogastric tube tip terminates at the mid stomach  Possible mild small bowel distention in the left mid abdomen, nonspecific  This can be reassessed on follow-up  Workstation performed: [id number]     XR abdomen 1 view kub    Result Date: 4/26/2023  Impression: Nasogastric tube has been inserted, tip seen passing towards the stomach, distal end not fully seen  Workstation performed: [id number]     CT head without contrast    Result Date: 4/25/2023  Impression: No acute intracranial abnormality  Workstation performed: [id number]     CTA dissection protocol chest abdomen pelvis w wo contrast    Result Date: 4/25/2023  Impression: 1  No aortic dissection, intramural hematoma, or aortic aneurysm  Patent mesenteric vessels  2  Circumferential mural thickening of the esophagus with an air-fluid level, which may relate to esophagitis and esophageal dysmotility  3  Hepatomegaly with severe hepatic steatosis  I personally discussed this study with Dr Claudia Blum on 4/25/2023 1:15 PM at 4/25/2023 1:36 PM  Workstation performed: [id number]     XR chest portable ICU    Result Date: 4/30/2023  Impression: Right lower lobe patchy opacity concerning for possible infiltrate  Workstation performed: [id number]     XR chest portable ICU    Result Date: 4/28/2023  Impression: No acute cardiopulmonary disease  Lines and tubes well-positioned with no pneumothorax  Workstation performed: [id number]     XR chest portable ICU    Result Date: 4/26/2023  Impression: No active pulmonary disease   Workstation performed: [id number]     XR chest portable ICU    Result Date: 4/26/2023  Impression: Right IJ line tip has been placed, tip overlies the SVC  No active pulmonary disease  Workstation performed: [id number]     US right upper quadrant    Result Date: 4/27/2023  Impression: 1  Hepatomegaly with severe hepatic steatosis suggested  2   Visualized portions of the pancreas are normal  3   Minimal cholelithiasis  Workstation performed: [id number]     Other Diagnostic Testing: I have personally reviewed pertinent reports      ACTIVE MEDICATIONS     Current Facility-Administered Medications   Medication Dose Route Frequency   • albuterol inhalation solution 2 5 mg  2 5 mg Nebulization Q4H PRN   • budesonide (PULMICORT) inhalation solution 0 5 mg  0 5 mg Nebulization Q12H   • enoxaparin (LOVENOX) subcutaneous injection 40 mg  40 mg Subcutaneous Daily   • folic acid 1 mg in sodium chloride 0 9 % 50 mL IVPB  1 mg Intravenous Daily   • formoterol (PERFOROMIST) nebulizer solution 20 mcg  20 mcg Nebulization Q12H   • guaiFENesin (ROBITUSSIN) oral liquid 200 mg  200 mg Oral Q4H PRN   • insulin lispro (HumaLOG) 100 units/mL subcutaneous injection 2-12 Units  2-12 Units Subcutaneous 4x Daily (AC & HS)   • ipratropium (ATROVENT) 0 02 % inhalation solution 0 5 mg  0 5 mg Nebulization TID   • levalbuterol (XOPENEX) inhalation solution 1 25 mg  1 25 mg Nebulization TID   • levothyroxine tablet 50 mcg  50 mcg Oral Early Morning   • magnesium sulfate 2 g/50 mL IVPB (premix) 2 g  2 g Intravenous Once   • midodrine (PROAMATINE) tablet 10 mg  10 mg Oral TID AC   • moisture barrier miconazole 2% cream (aka ANSHU MOISTURE BARRIER ANTIFUNGAL CREAM)   Topical BID   • multivitamin stress formula tablet 1 tablet  1 tablet Oral Daily   • nicotine (NICODERM CQ) 21 mg/24 hr TD 24 hr patch 21 mg  21 mg Transdermal Daily   • pantoprazole (PROTONIX) EC tablet 40 mg  40 mg Oral Daily Before Breakfast   • potassium chloride oral solution 40 mEq  40 mEq Oral Once   • predniSONE tablet 40 mg  40 mg Oral Daily   • psyllium (METAMUCIL) 1 packet  1 packet Oral Daily   • thiamine tablet 100 "mg  100 mg Oral Daily       VTE Pharmacologic Prophylaxis: Enoxaparin (Lovenox)  VTE Mechanical Prophylaxis: sequential compression device    Portions of the record may have been created with voice recognition software  Occasional wrong word or \"sound a like\" substitutions may have occurred due to the inherent limitations of voice recognition software    Read the chart carefully and recognize, using context, where substitutions have occurred   ==  Boaz Jack MD  [address]  Internal Medicine Residency PGY-1  "

## 2023-05-03 NOTE — PLAN OF CARE
Problem: SAFETY,RESTRAINT: NV/NON-SELF DESTRUCTIVE BEHAVIOR  Goal: Remains free of harm/injury (restraint for non violent/non self-detsructive behavior)  Description: INTERVENTIONS:  - Instruct patient/family regarding restraint use   - Assess and monitor physiologic and psychological status   - Provide interventions and comfort measures to meet assessed patient needs   - Identify and implement measures to help patient regain control  - Assess readiness for release of restraint   Outcome: Progressing     Problem: SKIN/TISSUE INTEGRITY - ADULT  Goal: Skin Integrity remains intact(Skin Breakdown Prevention)  Description: Assess:  -Perform Itz assessment   -Clean and moisturize skin   -Inspect skin when repositioning, toileting, and assisting with ADLS  -Assess extremities for adequate circulation and sensation     Bed Management:  -Have minimal linens on bed & keep smooth, unwrinkled  -Change linens as needed when moist or perspiring  -Avoid sitting or lying in one position for more than 2 hours while in bed  -Keep HOB at 30 degrees     Toileting:  -Offer bedside commode  -Assess for incontinence every 2 Hours  -Use incontinent care products after each incontinent episode     Activity:  -Encourage activity and walks on unit  -Encourage or provide ROM exercises   -Turn and reposition patient every 2 Hours  -Use appropriate equipment to lift or move patient in bed  -Instruct/ Assist with weight shifting when out of bed in chair  -Consider limitation of chair time     Skin Care:  -Avoid use of baby powder, tape, friction and shearing, hot water or constrictive clothing  -Relieve pressure over bony prominences   -Do not massage red bony areas

## 2023-05-03 NOTE — PROGRESS NOTES
Midflow decreased to 6L at 1130  Patient had increase WOB and c/o of SOB, although pulse ox remained above 90%  Increased midflow to 8L

## 2023-05-03 NOTE — PLAN OF CARE
Problem: MOBILITY - ADULT  Goal: Maintain or return to baseline ADL function  Description: INTERVENTIONS:  -  Assess patient's ability to carry out ADLs; assess patient's baseline for ADL function and identify physical deficits which impact ability to perform ADLs (bathing, care of mouth/teeth, toileting, grooming, dressing, etc )  - Assess/evaluate cause of self-care deficits   - Assess range of motion  - Assess patient's mobility; develop plan if impaired  - Assess patient's need for assistive devices and provide as appropriate  - Encourage maximum independence but intervene and supervise when necessary  - Involve family in performance of ADLs  - Assess for home care needs following discharge   - Consider OT consult to assist with ADL evaluation and planning for discharge  - Provide patient education as appropriate  Outcome: Progressing     Problem: Prexisting or High Potential for Compromised Skin Integrity  Goal: Skin integrity is maintained or improved  Description: INTERVENTIONS:  - Identify patients at risk for skin breakdown  - Assess and monitor skin integrity  - Assess and monitor nutrition and hydration status  - Monitor labs   - Assess for incontinence   - Turn and reposition patient  - Assist with mobility/ambulation  - Relieve pressure over bony prominences  - Avoid friction and shearing  - Provide appropriate hygiene as needed including keeping skin clean and dry  - Evaluate need for skin moisturizer/barrier cream  - Collaborate with interdisciplinary team   - Patient/family teaching  - Consider wound care consult   Outcome: Progressing     Problem: CARDIOVASCULAR - ADULT  Goal: Maintains optimal cardiac output and hemodynamic stability  Description: INTERVENTIONS:  - Monitor I/O, vital signs and rhythm  - Monitor for S/S and trends of decreased cardiac output  - Administer and titrate ordered vasoactive medications to optimize hemodynamic stability  - Assess quality of pulses, skin color and temperature  - Assess for signs of decreased coronary artery perfusion  - Instruct patient to report change in severity of symptoms  Outcome: Progressing  Goal: Absence of cardiac dysrhythmias or at baseline rhythm  Description: INTERVENTIONS:  - Continuous cardiac monitoring, vital signs, obtain 12 lead EKG if ordered  - Administer antiarrhythmic and heart rate control medications as ordered  - Monitor electrolytes and administer replacement therapy as ordered  Outcome: Progressing     Problem: RESPIRATORY - ADULT  Goal: Achieves optimal ventilation and oxygenation  Description: INTERVENTIONS:  - Assess for changes in respiratory status  - Assess for changes in mentation and behavior  - Position to facilitate oxygenation and minimize respiratory effort  - Oxygen administered by appropriate delivery if ordered  - Initiate smoking cessation education as indicated  - Encourage broncho-pulmonary hygiene including cough, deep breathe, Incentive Spirometry  - Assess the need for suctioning and aspirate as needed  - Assess and instruct to report SOB or any respiratory difficulty  - Respiratory Therapy support as indicated  Outcome: Progressing

## 2023-05-03 NOTE — UTILIZATION REVIEW
Continued Stay Review    Date: 5/3/2023                          Current Patient Class: inpatient    Current Level of Care: SD 2 ICU     HPI:62 y o  male initially admitted on 4/25/2023    Assessment/Plan: Acute respiratory failure ; daughter reports home O2 but patient does not use; Stage IV COPD based on OP Documentation currently off BiPAP req on 5/1 now weaning [address] on 6 L; likely goal about 2 L> room air; BiPAP HS  Cont PO Prednisone; completed 5 days dual IV Antibx, cont atrovent, performist, pulmicort; maintain POX>88%  Ordered 1uPRBC due to HGB drop recheck in PM, daily CBC  Encephalopathy on admit; currently oriented; monitor w many recent DC of CNS altering meds; cont thiamine & folate  Monitor fAST/ALT, TBili; daily CMP/INR   CIWA    Vital Signs:   Date/Time Temp Pulse Resp BP MAP (mmHg) SpO2 Calculated FIO2 (%) - Nasal Cannula O2 Flow Rate (L/min) Nasal Cannula O2 Flow Rate (L/min) O2 Device O2 Interface Device Patient Position - Orthostatic VS   05/03/23 1313 -- -- -- -- -- -- -- 6 L/min -- Mid flow nasal cannula MFNC prongs --   05/03/23 12:20:24 98 °F (36 7 °C) 110 Abnormal  18 92/63 73 95 % -- -- -- -- -- --   05/03/23 12:04:42 98 1 °F (36 7 °C) 107 Abnormal  18 94/59 71 94 % -- -- -- -- -- --   05/03/23 1130 -- -- -- -- -- -- 44 -- 6 L/min Mid flow nasal cannula -- --   05/03/23 1100 -- -- -- 96/60  -- -- -- -- -- -- -- Lying   BP: TRN at 05/03/23 1100   05/03/23 10:57:48 98 8 °F (37 1 °C) 104 18 86/55 Abnormal  65 92 % -- -- -- -- -- --   05/03/23 0900 -- -- -- -- -- -- -- 8 L/min -- Mid flow nasal cannula -- --   05/03/23 0703 -- -- -- -- -- 97 % -- 8 L/min -- Mid flow nasal cannula -- --   05/03/23 06:25:40 98 1 °F (36 7 °C) 91 -- 93/57 69 92 % -- -- -- -- -- --   05/03/23 05:19:33 98 °F (36 7 °C) 92 -- 90/47 Abnormal  61 Abnormal  96 % -- -- -- -- -- --   05/03/23 02:39:29 98 °F (36 7 °C) 89 -- 114/57 76 94 % -- -- -- -- -- --   05/03/23 0059 -- -- -- -- -- 91 % 60 -- 10 L/min Mid flow nasal cannula -- --   05/03/23 0000 98 3 °F (36 8 °C) 94 20 91/54 -- -- -- -- -- -- -- Lying         Pertinent Labs/Diagnostic Results:   Results from last 7 days   Lab Units 05/01/23  1115   SARS-COV-2  Negative     Results from last 7 days   Lab Units 05/03/23  0928 05/03/23  0606 05/03/23  0438 05/02/23  0457 05/01/23  0640 04/30/23  0933 04/30/23  0455 04/27/23  1615 04/27/23  0617   WBC Thousand/uL  --   --  10 44* 13 66* 18 61*  --  16 93*   < > 8 81   HEMOGLOBIN g/dL 7 2* 6 8* 6 9* 7 9* 8 8*   < > 6 8*   < > 7 2*   HEMATOCRIT % 22 9* 21 5* 21 3* 24 8* 27 5*   < > 21 6*   < > 21 3*   PLATELETS Thousands/uL  --   --  172 143* 141*  --  93*   < > 83*   NEUTROS ABS Thousands/µL  --   --   --   --   --   --   --   --  8 01*   BANDS PCT %  --   --   --   --   --   --  1  --   --     < > = values in this interval not displayed       Results from last 7 days   Lab Units 04/30/23  0455   RETIC CT ABS  21,500   RETIC CT PCT % 1 06     Results from last 7 days   Lab Units 05/03/23  0928 05/03/23  0438 05/02/23  0457 05/01/23  0815 05/01/23  0640 04/30/23  0455 04/29/23  1814 04/28/23  0447 04/28/23  0443 04/27/23  0452 04/27/23  0450   SODIUM mmol/L 137 138 143 143  --  144 143   < >  --    < > 130*   POTASSIUM mmol/L 3 8 3 6 3 8 3 9  --  3 9 4 7   < >  --    < > 3 8   CHLORIDE mmol/L 104 104 107 109*  --  112* 112*   < >  --    < > 99   CO2 mmol/L 30 30 33* 30  --  29 27   < >  --    < > 21   ANION GAP mmol/L 3* 4 3* 4  --  3* 4   < >  --    < > 10   BUN mg/dL 12 12 14 14  --  15 19   < >  --    < > 33*   CREATININE mg/dL 0 87 0 84 0 99 1 09  --  1 10 1 32*   < >  --    < > 2 09*   EGFR ml/min/1 73sq m 93 95 82 73  --  72 58   < >  --    < > 33   CALCIUM mg/dL 7 8* 7 8* 8 4 8 1*  --  7 1* 7 6*   < >  --    < > 7 7*   CALCIUM, IONIZED mmol/L  --  0 96*  --   --  1 07* 0 97*  --   --  1 08*  --  1 06*   MAGNESIUM mg/dL 1 9 1 9 1 7  --  2 0 1 8 2 0   < >  --    < > 2 5   PHOSPHORUS mg/dL  --  2 5 1 3*  --  2 0* 3 0 4 1   < >  -- < > 1 9*    < > = values in this interval not displayed       Results from last 7 days   Lab Units 05/03/23  0438 05/02/23  0457 05/01/23  0815 04/30/23  0933 04/30/23  0455 04/29/23  0508 04/28/23  0443   AST U/L 60* 88* 105*  --  133* 89* 89*   ALT U/L 52 65 72  --  69 57 46   ALK PHOS U/L 222* 255* 272*  --  238* 209* 169*   TOTAL PROTEIN g/dL 5 0* 5 4* 5 3*  --  5 1* 4 8* 4 8*   ALBUMIN g/dL 2 5* 2 5* 2 4*  --  2 6* 1 8* 1 7*   TOTAL BILIRUBIN mg/dL 2 09* 2 07* 2 32*  --  2 36* 1 73* 1 53*   BILIRUBIN DIRECT mg/dL  --   --   --   --  1 91* 1 36* 1 06*   AMMONIA umol/L  --   --   --  43*  --   --   --      Results from last 7 days   Lab Units 05/03/23  1125 05/03/23  0735 05/02/23  2036 05/02/23  1726 05/02/23  1114 05/02/23  0706 05/01/23  2118 05/01/23  1551 05/01/23  1203 05/01/23  0606 05/01/23  0022 04/30/23  2030   POC GLUCOSE mg/dl 165* 106 97 142* 151* 150* 135 254* 111 96 68 115     Results from last 7 days   Lab Units 05/03/23  0928 05/03/23  0438 05/02/23  0457 05/01/23  0815 04/30/23  0455 04/29/23  1814 04/29/23  0508 04/28/23  1724 04/28/23  0447 04/28/23  0007 04/27/23  1615 04/27/23  1135   GLUCOSE RANDOM mg/dL 114 98 100 146* 66 111 93 145* 139 172* 260* 229*             No results found for: BETA-HYDROXYBUTYRATE   Results from last 7 days   Lab Units 04/30/23  0932 04/30/23  0725 04/26/23  1856   PH ART  7 411 7 300* 7 326*   PCO2 ART mm Hg 48 1* 56 5* 50 7*   PO2 ART mm Hg 68 2* 61 0* 163 2*   HCO3 ART mmol/L 29 9* 27 2 25 9   BASE EXC ART mmol/L 4 7 0 4 -0 3   O2 CONTENT ART mL/dL 10 5* 10 1* 12 3*   O2 HGB, ARTERIAL % 92 4* 86 8* 98 5*   ABG SOURCE  Brachial, Right Brachial, Right  --      Results from last 7 days   Lab Units 05/01/23  0815 04/30/23  0613 04/28/23  1443   PH SANAZ  7 490* 7 243* 7 327   PCO2 SANAZ mm Hg 38 0* 64 7* 45 4   PO2 SANAZ mm Hg 139 2* 76 4* 35 7   HCO3 SANAZ mmol/L 28 3 27 3 23 2*   BASE EXC SANAZ mmol/L 4 7 -0 4 -2 7   O2 CONTENT SANAZ ml/dL 11 9 10 1 8 4   O2 HGB, VENOUS % 95 5* 90 6* 63 0             Results from last 7 days   Lab Units 05/01/23  1727 05/01/23  1514 05/01/23  1202   HS TNI 0HR ng/L  --   --  38   HS TNI 2HR ng/L  --  34  --    HSTNI D2 ng/L  --  -4  --    HS TNI 4HR ng/L 31  --   --    HSTNI D4 ng/L -7  --   --          Results from last 7 days   Lab Units 04/28/23  0443 04/27/23  0557   PROTIME seconds 16 6* 16 9*   INR  1 32* 1 35*     Results from last 7 days   Lab Units 04/30/23  0455   TSH 3RD GENERATON uIU/mL 5 220*     Results from last 7 days   Lab Units 05/01/23  0640 04/28/23  0443   PROCALCITONIN ng/ml 1 04* 1 90*     Results from last 7 days   Lab Units 05/01/23  0815   LACTIC ACID mmol/L 1 4                 Results from last 7 days   Lab Units 04/30/23  0455   FERRITIN ng/mL 2,507*                             Results from last 7 days   Lab Units 05/01/23  1115   INFLUENZA A PCR  Negative   INFLUENZA B PCR  Negative   RSV PCR  Negative                             Results from last 7 days   Lab Units 04/27/23  1726   SPUTUM CULTURE  1+ Growth of - Presumptive Candida albicans*  Few Colonies of   GRAM STAIN RESULT  Rare Epithelial Cells  No Polys or Bacteria seen       Medications:   Scheduled Medications:  budesonide, 0 5 mg, Nebulization, Q12H  enoxaparin, 40 mg, Subcutaneous, Daily  folic acid IVPB, 1 mg, Intravenous, Daily  formoterol, 20 mcg, Nebulization, Q12H  insulin lispro, 2-12 Units, Subcutaneous, 4x Daily (AC & HS)  ipratropium, 0 5 mg, Nebulization, TID  levalbuterol, 1 25 mg, Nebulization, TID  levothyroxine, 50 mcg, Oral, Early Morning  loperamide, 2 mg, Oral, BID  magnesium sulfate, 2 g, Intravenous, Once  midodrine, 10 mg, Oral, TID AC  ANSHU ANTIFUNGAL, , Topical, BID  multivitamin stress formula, 1 tablet, Oral, Daily  nicotine, 21 mg, Transdermal, Daily  pantoprazole, 40 mg, Oral, Daily Before Breakfast  potassium chloride, 40 mEq, Oral, Once  predniSONE, 40 mg, Oral, Daily  psyllium, 1 packet, Oral, Daily  thiamine, 100 mg, Oral, Daily      Continuous IV Infusions:     PRN Meds:  albuterol, 2 5 mg, Nebulization, Q4H PRN  guaiFENesin, 200 mg, Oral, Q4H PRN    Discharge Plan: TBD  Network Utilization Review Department  ATTENTION: Please call with any questions or concerns to [phone] and carefully listen to the prompts so that you are directed to the right person  All voicemails are confidential   Nichole Adams all requests for admission clinical reviews, approved or denied determinations and any other requests to dedicated fax number below belonging to the campus where the patient is receiving treatment   List of dedicated fax numbers for the Facilities:  [address] DENIALS (Administrative/Medical Necessity) [phone]   [address] (Maternity/NICU/Pediatrics) [phone]   9 Lucille Espinoza [phone]   [place] 77 [phone]   [address] [address] [facility] [phone]   1550 [facility] Sharon Barrios formerly [facility] 134 815 [facility] [phone]

## 2023-05-04 ENCOUNTER — TELEPHONE (OUTPATIENT)
Dept: PULMONOLOGY | Facility: CLINIC | Age: 63
End: 2023-05-04

## 2023-05-04 ENCOUNTER — APPOINTMENT (INPATIENT)
Dept: RADIOLOGY | Facility: HOSPITAL | Age: 63
End: 2023-05-04

## 2023-05-04 LAB
ALBUMIN SERPL BCP-MCNC: 2.5 G/DL (ref 3.5–5)
ALP SERPL-CCNC: 217 U/L (ref 46–116)
ALT SERPL W P-5'-P-CCNC: 49 U/L (ref 12–78)
ANION GAP SERPL CALCULATED.3IONS-SCNC: 4 MMOL/L (ref 4–13)
AST SERPL W P-5'-P-CCNC: 56 U/L (ref 5–45)
BASOPHILS # BLD AUTO: 0.01 THOUSANDS/ÂΜL (ref 0–0.1)
BASOPHILS NFR BLD AUTO: 0 % (ref 0–1)
BILIRUB SERPL-MCNC: 2.97 MG/DL (ref 0.2–1)
BUN SERPL-MCNC: 14 MG/DL (ref 5–25)
CALCIUM ALBUM COR SERPL-MCNC: 8.9 MG/DL (ref 8.3–10.1)
CALCIUM SERPL-MCNC: 7.7 MG/DL (ref 8.3–10.1)
CHLORIDE SERPL-SCNC: 105 MMOL/L (ref 96–108)
CO2 SERPL-SCNC: 31 MMOL/L (ref 21–32)
CREAT SERPL-MCNC: 0.87 MG/DL (ref 0.6–1.3)
EOSINOPHIL # BLD AUTO: 0.12 THOUSAND/ÂΜL (ref 0–0.61)
EOSINOPHIL NFR BLD AUTO: 1 % (ref 0–6)
ERYTHROCYTE [DISTWIDTH] IN BLOOD BY AUTOMATED COUNT: 19.7 % (ref 11.6–15.1)
GFR SERPL CREATININE-BSD FRML MDRD: 93 ML/MIN/1.73SQ M
GLUCOSE SERPL-MCNC: 161 MG/DL (ref 65–140)
GLUCOSE SERPL-MCNC: 186 MG/DL (ref 65–140)
GLUCOSE SERPL-MCNC: 69 MG/DL (ref 65–140)
GLUCOSE SERPL-MCNC: 86 MG/DL (ref 65–140)
GLUCOSE SERPL-MCNC: 98 MG/DL (ref 65–140)
HCT VFR BLD AUTO: 28.5 % (ref 36.5–49.3)
HGB BLD-MCNC: 8.7 G/DL (ref 12–17)
IMM GRANULOCYTES # BLD AUTO: 0.18 THOUSAND/UL (ref 0–0.2)
IMM GRANULOCYTES NFR BLD AUTO: 2 % (ref 0–2)
LYMPHOCYTES # BLD AUTO: 1.34 THOUSANDS/ÂΜL (ref 0.6–4.47)
LYMPHOCYTES NFR BLD AUTO: 11 % (ref 14–44)
MAGNESIUM SERPL-MCNC: 2.7 MG/DL (ref 1.6–2.6)
MCH RBC QN AUTO: 32 PG (ref 26.8–34.3)
MCHC RBC AUTO-ENTMCNC: 30.5 G/DL (ref 31.4–37.4)
MCV RBC AUTO: 105 FL (ref 82–98)
MONOCYTES # BLD AUTO: 1.07 THOUSAND/ÂΜL (ref 0.17–1.22)
MONOCYTES NFR BLD AUTO: 9 % (ref 4–12)
NEUTROPHILS # BLD AUTO: 9.2 THOUSANDS/ÂΜL (ref 1.85–7.62)
NEUTS SEG NFR BLD AUTO: 77 % (ref 43–75)
NRBC BLD AUTO-RTO: 0 /100 WBCS
PHOSPHATE SERPL-MCNC: 2 MG/DL (ref 2.3–4.1)
PLATELET # BLD AUTO: 225 THOUSANDS/UL (ref 149–390)
PMV BLD AUTO: 10.9 FL (ref 8.9–12.7)
POTASSIUM SERPL-SCNC: 3.8 MMOL/L (ref 3.5–5.3)
PROT SERPL-MCNC: 5.6 G/DL (ref 6.4–8.4)
RBC # BLD AUTO: 2.72 MILLION/UL (ref 3.88–5.62)
SODIUM SERPL-SCNC: 140 MMOL/L (ref 135–147)
WBC # BLD AUTO: 11.92 THOUSAND/UL (ref 4.31–10.16)

## 2023-05-04 RX ADMIN — LEVOTHYROXINE SODIUM 50 MCG: 50 TABLET ORAL at 08:41

## 2023-05-04 RX ADMIN — FOLIC ACID 1 MG: 5 INJECTION, SOLUTION INTRAMUSCULAR; INTRAVENOUS; SUBCUTANEOUS at 08:43

## 2023-05-04 RX ADMIN — MICONAZOLE NITRATE: 20 CREAM TOPICAL at 18:00

## 2023-05-04 RX ADMIN — FORMOTEROL FUMARATE DIHYDRATE 20 MCG: 20 SOLUTION RESPIRATORY (INHALATION) at 07:17

## 2023-05-04 RX ADMIN — BUDESONIDE 0.5 MG: 0.5 INHALANT ORAL at 07:17

## 2023-05-04 RX ADMIN — MIDODRINE HYDROCHLORIDE 10 MG: 5 TABLET ORAL at 06:07

## 2023-05-04 RX ADMIN — INSULIN LISPRO 2 UNITS: 100 INJECTION, SOLUTION INTRAVENOUS; SUBCUTANEOUS at 12:37

## 2023-05-04 RX ADMIN — LEVALBUTEROL HYDROCHLORIDE 1.25 MG: 1.25 SOLUTION RESPIRATORY (INHALATION) at 07:17

## 2023-05-04 RX ADMIN — ENOXAPARIN SODIUM 40 MG: 40 INJECTION SUBCUTANEOUS at 08:41

## 2023-05-04 RX ADMIN — FORMOTEROL FUMARATE DIHYDRATE 20 MCG: 20 SOLUTION RESPIRATORY (INHALATION) at 20:55

## 2023-05-04 RX ADMIN — LOPERAMIDE HCL 2 MG: 1 SOLUTION ORAL at 22:08

## 2023-05-04 RX ADMIN — THIAMINE HCL TAB 100 MG 100 MG: 100 TAB at 08:41

## 2023-05-04 RX ADMIN — LOPERAMIDE HCL 2 MG: 1 SOLUTION ORAL at 08:41

## 2023-05-04 RX ADMIN — IPRATROPIUM BROMIDE 0.5 MG: 0.5 SOLUTION RESPIRATORY (INHALATION) at 07:17

## 2023-05-04 RX ADMIN — PREDNISONE 40 MG: 20 TABLET ORAL at 08:41

## 2023-05-04 RX ADMIN — MICONAZOLE NITRATE: 20 CREAM TOPICAL at 08:44

## 2023-05-04 RX ADMIN — BUDESONIDE 0.5 MG: 0.5 INHALANT ORAL at 20:56

## 2023-05-04 RX ADMIN — IPRATROPIUM BROMIDE 0.5 MG: 0.5 SOLUTION RESPIRATORY (INHALATION) at 13:12

## 2023-05-04 RX ADMIN — NICOTINE 21 MG: 21 PATCH, EXTENDED RELEASE TRANSDERMAL at 08:44

## 2023-05-04 RX ADMIN — MIDODRINE HYDROCHLORIDE 10 MG: 5 TABLET ORAL at 17:58

## 2023-05-04 RX ADMIN — LEVALBUTEROL HYDROCHLORIDE 1.25 MG: 1.25 SOLUTION RESPIRATORY (INHALATION) at 13:12

## 2023-05-04 RX ADMIN — IPRATROPIUM BROMIDE 0.5 MG: 0.5 SOLUTION RESPIRATORY (INHALATION) at 20:55

## 2023-05-04 RX ADMIN — Medication 1 PACKET: at 08:41

## 2023-05-04 RX ADMIN — B-COMPLEX W/ C & FOLIC ACID TAB 1 TABLET: TAB at 08:41

## 2023-05-04 RX ADMIN — PANTOPRAZOLE SODIUM 40 MG: 40 TABLET, DELAYED RELEASE ORAL at 06:07

## 2023-05-04 RX ADMIN — INSULIN LISPRO 2 UNITS: 100 INJECTION, SOLUTION INTRAVENOUS; SUBCUTANEOUS at 17:58

## 2023-05-04 RX ADMIN — LEVALBUTEROL HYDROCHLORIDE 1.25 MG: 1.25 SOLUTION RESPIRATORY (INHALATION) at 20:55

## 2023-05-04 RX ADMIN — MIDODRINE HYDROCHLORIDE 10 MG: 5 TABLET ORAL at 12:38

## 2023-05-04 NOTE — PLAN OF CARE
Problem: MOBILITY - ADULT  Goal: Maintain or return to baseline ADL function  Description: INTERVENTIONS:  -  Assess patient's ability to carry out ADLs; assess patient's baseline for ADL function and identify physical deficits which impact ability to perform ADLs (bathing, care of mouth/teeth, toileting, grooming, dressing, etc )  - Assess/evaluate cause of self-care deficits   - Assess range of motion  - Assess patient's mobility; develop plan if impaired  - Assess patient's need for assistive devices and provide as appropriate  - Encourage maximum independence but intervene and supervise when necessary  - Involve family in performance of ADLs  - Assess for home care needs following discharge   - Consider OT consult to assist with ADL evaluation and planning for discharge  - Provide patient education as appropriate  Outcome: Progressing  Goal: Maintains/Returns to pre admission functional level  Description: INTERVENTIONS:  - Perform BMAT or MOVE assessment daily    - Set and communicate daily mobility goal to care team and patient/family/caregiver     - Collaborate with rehabilitation services on mobility goals if consulted  Outcome: Progressing     Problem: Prexisting or High Potential for Compromised Skin Integrity  Goal: Skin integrity is maintained or improved  Description: INTERVENTIONS:  - Identify patients at risk for skin breakdown  - Assess and monitor skin integrity  - Assess and monitor nutrition and hydration status  - Monitor labs   - Assess for incontinence   - Turn and reposition patient  - Assist with mobility/ambulation  - Relieve pressure over bony prominences  - Avoid friction and shearing  - Provide appropriate hygiene as needed including keeping skin clean and dry  - Evaluate need for skin moisturizer/barrier cream  - Collaborate with interdisciplinary team   - Patient/family teaching  - Consider wound care consult   Outcome: Progressing     Problem: CARDIOVASCULAR - ADULT  Goal: Maintains optimal cardiac output and hemodynamic stability  Description: INTERVENTIONS:  - Monitor I/O, vital signs and rhythm  - Monitor for S/S and trends of decreased cardiac output  - Administer and titrate ordered vasoactive medications to optimize hemodynamic stability  - Assess quality of pulses, skin color and temperature  - Assess for signs of decreased coronary artery perfusion  - Instruct patient to report change in severity of symptoms  Outcome: Progressing  Goal: Absence of cardiac dysrhythmias or at baseline rhythm  Description: INTERVENTIONS:  - Continuous cardiac monitoring, vital signs, obtain 12 lead EKG if ordered  - Administer antiarrhythmic and heart rate control medications as ordered  - Monitor electrolytes and administer replacement therapy as ordered  Outcome: Progressing     Problem: RESPIRATORY - ADULT  Goal: Achieves optimal ventilation and oxygenation  Description: INTERVENTIONS:  - Assess for changes in respiratory status  - Assess for changes in mentation and behavior  - Position to facilitate oxygenation and minimize respiratory effort  - Oxygen administered by appropriate delivery if ordered  - Initiate smoking cessation education as indicated  - Encourage broncho-pulmonary hygiene including cough, deep breathe, Incentive Spirometry  - Assess the need for suctioning and aspirate as needed  - Assess and instruct to report SOB or any respiratory difficulty  - Respiratory Therapy support as indicated  Outcome: Progressing     Problem: GENITOURINARY - ADULT  Goal: Maintains or returns to baseline urinary function  Description: INTERVENTIONS:  - Assess urinary function  - Encourage oral fluids to ensure adequate hydration if ordered  - Administer IV fluids as ordered to ensure adequate hydration  - Administer ordered medications as needed  - Offer frequent toileting  - Follow urinary retention protocol if ordered  Outcome: Progressing  Goal: Absence of urinary retention  Description: INTERVENTIONS:  - Assess patient’s ability to void and empty bladder  - Monitor I/O  - Bladder scan as needed  - Discuss with physician/AP medications to alleviate retention as needed  - Discuss catheterization for long term situations as appropriate  Outcome: Progressing  Goal: Urinary catheter remains patent  Description: INTERVENTIONS:  - Assess patency of urinary catheter  - If patient has a chronic hernandez, consider changing catheter if non-functioning  - Follow guidelines for intermittent irrigation of non-functioning urinary catheter  Outcome: Progressing     Problem: METABOLIC, FLUID AND ELECTROLYTES - ADULT  Goal: Electrolytes maintained within normal limits  Description: INTERVENTIONS:  - Monitor labs and assess patient for signs and symptoms of electrolyte imbalances  - Administer electrolyte replacement as ordered  - Monitor response to electrolyte replacements, including repeat lab results as appropriate  - Instruct patient on fluid and nutrition as appropriate  Outcome: Progressing  Goal: Fluid balance maintained  Description: INTERVENTIONS:  - Monitor labs   - Monitor I/O and WT  - Instruct patient on fluid and nutrition as appropriate  - Assess for signs & symptoms of volume excess or deficit  Outcome: Progressing  Goal: Glucose maintained within target range  Description: INTERVENTIONS:  - Monitor Blood Glucose as ordered  - Assess for signs and symptoms of hyperglycemia and hypoglycemia  - Administer ordered medications to maintain glucose within target range  - Assess nutritional intake and initiate nutrition service referral as needed  Outcome: Progressing     Problem: SKIN/TISSUE INTEGRITY - ADULT  Goal: Skin Integrity remains intact(Skin Breakdown Prevention)  Description: Assess:  -Perform Itz assessment   -Clean and moisturize skin   -Inspect skin when repositioning, toileting, and assisting with ADLS  -Assess under medical devices   -Assess extremities for adequate circulation and sensation Bed Management:  -Have minimal linens on bed & keep smooth, unwrinkled  -Change linens as needed when moist or perspiring  -Avoid sitting or lying in one position for more than 2 hours while in bed  -Keep HOB at 30 degrees     Toileting:  -Offer bedside commode  -Assess for incontinence every 2  -Use incontinent care products after each incontinent episode     Activity:  -Encourage activity and walks on unit  -Encourage or provide ROM exercises   -Turn and reposition patient every 2 Hours  -Use appropriate equipment to lift or move patient in bed  -Instruct/ Assist with weight shifting every 2 when out of bed in chair    Skin Care:  -Avoid use of baby powder, tape, friction and shearing, hot water or constrictive clothing  -Relieve pressure over bony prominences using Allevyn  -Do not massage red bony areas      Outcome: Progressing  Goal: Incision(s), wounds(s) or drain site(s) healing without S/S of infection  Description: INTERVENTIONS  - Assess and document dressing, incision, wound bed, drain sites and surrounding tissue  - Provide patient and family education    Outcome: Progressing  Goal: Pressure injury heals and does not worsen  Description: Interventions:  - Implement low air loss mattress or specialty surface (Criteria met)  - Apply silicone foam dressing  - Use special pressure reducing interventions such as waffle cushion when in chair   - Apply fecal or urinary incontinence containment device   - Turn and reposition patient & offload bony prominences every 2 hours   - Utilize friction reducing device or surface for transfers   - Consider consults to  interdisciplinary teams such as dietician  - Use incontinent care products after each incontinent episode   - Consider nutrition services referral as needed  Outcome: Progressing     Problem: HEMATOLOGIC - ADULT  Goal: Maintains hematologic stability  Description: INTERVENTIONS  - Assess for signs and symptoms of bleeding or hemorrhage  - Monitor labs  - Administer supportive blood products/factors as ordered and appropriate  Outcome: Progressing     Problem: Nutrition/Hydration-ADULT  Goal: Nutrient/Hydration intake appropriate for improving, restoring or maintaining nutritional needs  Description: Monitor and assess patient's nutrition/hydration status for malnutrition  Collaborate with interdisciplinary team and initiate plan and interventions as ordered  Monitor patient's weight and dietary intake as ordered or per policy  Utilize nutrition screening tool and intervene as necessary  Determine patient's food preferences and provide high-protein, high-caloric foods as appropriate       INTERVENTIONS:  - Monitor oral intake, urinary output, labs, and treatment plans  - Assess nutrition and hydration status and recommend course of action  - Evaluate amount of meals eaten  - Assist patient with eating if necessary   - Allow adequate time for meals  - Recommend/ encourage appropriate diets, oral nutritional supplements, and vitamin/mineral supplements  - Order, calculate, and assess calorie counts as needed  - Recommend, monitor, and adjust tube feedings and TPN/PPN based on assessed needs  - Assess need for intravenous fluids  - Provide specific nutrition/hydration education as appropriate  - Include patient/family/caregiver in decisions related to nutrition  Outcome: Progressing     Problem: SAFETY,RESTRAINT: NV/NON-SELF DESTRUCTIVE BEHAVIOR  Goal: Remains free of harm/injury (restraint for non violent/non self-detsructive behavior)  Description: INTERVENTIONS:  - Instruct patient/family regarding restraint use   - Assess and monitor physiologic and psychological status   - Provide interventions and comfort measures to meet assessed patient needs   - Identify and implement measures to help patient regain control  - Assess readiness for release of restraint   Outcome: Progressing  Goal: Returns to optimal restraint-free functioning  Description: INTERVENTIONS:  - Assess the patient's behavior and symptoms that indicate continued need for restraint  - Identify and implement measures to help patient regain control  - Assess readiness for release of restraint   Outcome: Progressing

## 2023-05-04 NOTE — ASSESSMENT & PLAN NOTE
Patient presented with , ALT 66, alk phos 263, and T  bili 1 10 with unclear baseline as patient without recent CMP to evaluate  CT of the chest abdomen pelvis completed on 4/25 significant for hepatomegaly and severe hepatic steatosis  Chronic hepatitis panel completed was negative  Right upper quadrant ultrasound done on 4/27 demonstrated hepatomegaly with severe hepatic steatosis without concern for cholecystitis or signs of cirrhosis  Plan:  -T  bili 2 09 -> 2 97 today  -Given lack of abdominal pain hold off imaging at this time, trend labs    -Possibly in setting of ongoing drinking with appropriate ratio  -Continue to monitor daily CMP/INR

## 2023-05-04 NOTE — CASE MANAGEMENT
Case Management Discharge Planning Note    Patient name Asmita Haley  Location [facility] 901/[facility] 901-01 MRN [id number]  : 1960 Date 2023       Current Admission Date: 2023  Current Admission Diagnosis:Encephalopathy   Patient Active Problem List    Diagnosis Date Noted   • Acute respiratory failure with hypoxia ([facility] ) 2023   • End stage COPD ([facility] ) 2023   • Prediabetes 2023   • Abnormal liver function 2023   • Screening for diabetes mellitus 2019   • Status post reversal of ileostomy 10/25/2018   • Hypercalcemia 2018   • Hematemesis with nausea 2018   • Presence of drug-eluting stent in right coronary artery 2018   • Paroxysmal atrial fibrillation ([facility] ) 2018   • Encephalopathy 2018   • Hypotension 2018   • Macrocytic anemia 2018   • Hypocalcemia 2018   • Perforation of colon (HCC)    • Moderate protein-calorie malnutrition ([facility] ) 2018   • Heart failure, systolic, due to idiopathic cardiomyopathy ([facility] ) 2018   • Agitation 2018   • Alcohol abuse 05/15/2018   • Cigarette nicotine dependence without complication    • Stage 4 very severe COPD by GOLD classification ([facility] ) 2017   • Spinal stenosis of cervical region 2016   • Pericarditis 2016   • Coronary artery disease involving native coronary artery of native heart without angina pectoris 2016   • Crohn disease ([facility] ) 10/22/2016   • Atherosclerosis of coronary artery 2016   • Dyslipidemia 10/19/2013      LOS (days): 9  Geometric Mean LOS (GMLOS) (days):   Days to GMLOS:     OBJECTIVE:  Risk of Unplanned Readmission Score: 21 3         Current admission status: Inpatient   Preferred Pharmacy:   [address], [address]  Phone: [phone] Fax: [phone]    Primary Care Provider: Namita Borrego DO    Primary Insurance: [facility]  Secondary Insurance:     DISCHARGE DETAILS:                                          Other Referral/Resources/Interventions Provided:  Referral Comments: Need accepting vna  TC to [place] [address] & transferred to [facility] office  S/w intake & they can accept  They only got a paper fax & no contact info  They couildn't locate pt in [facility]  Resent to [facility] office in office & they received in [facility]  Reserved & updated epic

## 2023-05-04 NOTE — PROCEDURES
Speech Pathology - Modified Barium Swallow Study    Patient Name: Wilbur Medina    Today's Date: 5/4/2023     Problem List  Principal Problem:    Encephalopathy  Active Problems:    Crohn disease ([address] )    Coronary artery disease involving native coronary artery of native heart without angina pectoris    Alcohol abuse    Cigarette nicotine dependence without complication    Macrocytic anemia    Acute respiratory failure with hypoxia ([facility] )    End stage COPD ([email or web address] )    Prediabetes    Abnormal liver function      Past Medical History  Past Medical History:   Diagnosis Date   • Anxiety    • Asthma    • Cardiac disease    • Cervical stenosis (uterine cervix)    • Chest pain    • Chronic kidney disease    • Colitis    • Colon polyps    • COPD (chronic obstructive pulmonary disease) (HCC)     2L @ HS and PRN   • Coronary artery disease    • Diverticulitis    • Esophageal reflux    • 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HCC)    • Hyperlipidemia    • Hypertension    • IBD (inflammatory bowel disease)    • Myocardial infarction [facility])    • Old myocardial infarction    • Perforation of colon ([email or web address] )    • Type 2 diabetes, diet controlled ([place] [id number] )    • Ulcerative colitis ([email or web address] )        Past Surgical History  Past Surgical History:   Procedure Laterality Date   • ANGIOPLASTY     • APPENDECTOMY     • COLON SURGERY     • COLONOSCOPY N/A 10/24/2016    Procedure: COLONOSCOPY;  Surgeon: Cecille Yen MD;  Location: [facility]; Service:    • CORONARY ANGIOPLASTY WITH STENT PLACEMENT     • ESOPHAGOGASTRODUODENOSCOPY N/A 10/24/2016    Procedure: ESOPHAGOGASTRODUODENOSCOPY (EGD); Surgeon: Cecille Yen MD;  Location: [facility];   Service:    • EXPLORATORY LAPAROTOMY W/ BOWEL RESECTION N/A 5/22/2018    Procedure: EXPLORATORY LAPAROTOMY, ILIOCOLECTOMY, ILIOCOLONIC ANASTAMOSIS, LOOP ILIOSTOMY, REPAIR OF SEROSAL TEAR, EXTENSIVE LYSIS OF ADHESIONS, WOUND VAC PLACEMENT;  Surgeon: Cecille Yen MD;  [facility]: [facility];  Service: Colorectal   • HEMICOLECTOMY     • ILEOSTOMY CLOSURE N/A 10/5/2018    Procedure: Colette Javed;  Surgeon: Demi Brambila MD;  [facility];  Service: Colorectal   • OTHER SURGICAL HISTORY      stent indications acute myocardial infarction   • GA COLONOSCOPY FLX DX W/COLLJ SPEC WHEN PFRMD N/A 2/6/2018    Procedure: COLONOSCOPY;  Surgeon: Demi Brambila MD;  [facility]: [facility]; Service: Colorectal   • GA COLONOSCOPY FLX DX W/COLLJ SPEC WHEN PFRMD N/A 10/4/2018    Procedure: COLONOSCOPY;  Surgeon: Demi Brambila MD;  [facility]: [facility]; Service: Colorectal   • TONSILLECTOMY         Assessment Summary:    Pt presents with adequate oral and pharyngeal stages of swallowing  Mastication time is min prolonged with regular cookie  Oral transfer is adequate  Swallow is timely  The patient presents with transient penetration across trials of nectar thick and thin liquids  No aspiration events observed  The patient takes barium tablet dry, with no liquids  Stasis in esophagus observed, but clears with liquid wash  Note: Images are available for review in PACS as desired  Recommendations:   Recommended Diet: regular diet and thin liquids   Recommended Form of Medications: whole with liquid   Aspiration precautions and compensatory swallowing strategies: upright posture and small bites/sips  Consider referral to:  None at this time   SLP Dysphagia therapy recommended: f/u 1-2 x      Results Reviewed with: patient, RN and MD   Pt/Family Education: initiated  Pt and caregivers would benefit from/require continued education  Patient Stated Goal:    Dysphagia Goals per SLP: pt will tolerate regular solids  with thin liquids  without s/s of aspiration x1-2     General Information;  Billy Manuel is a 63 y o  male who presents with shortness of breath for a couple of days that worsened this morning  PMH stage 4 COPD, alcohol abuse, tobacco abuse, Crohn's disease s/p ileostomy and reversal in 2018   Patient states the only medications that he takes are albuterol and ipratropium  He endorses drinking several glasses of kenji daily, last drink was last night  Denies history of seizures  ED workup demonstrates lactic acidosis of 20 3, leukocytosis WBC 20 57, ammonia 82  [facility] 4/25 negative  CT CAP 4/25 showed circumferential mural thickening of esophagus with air-fluid level and hepatomegaly with severe hepatic steatosis       Current concerns for dysphagia include coughing with thin liquids at bedside  MBS was recommended to assess oropharyngeal stage swallowing skills at this time  Prior MBS: none     Oral Mechanism Exam  Not completed at this time  Patient reports missing upper dentition, but that he can eat a regular diet fine     Pt was viewed sitting upright in the lateral and AP positions  Trials administered were consistent with MBSImP Validated Protocol: Pt was given 5-mL thin liquid x2, 20-mL cup sip thin, 40-mL sequential swallow thin, 5-mL nectar thick, 20-mL cup sip nectar thick, 5-mL honey thick, 5-mL pudding, ½ cookie coated with 3-mL pudding, 5-mL nectar thick in the AP position and 5-mL pudding in the AP position  Pt was also given thin liquids by straw, and swallowed a barium tablet dry  Initial view observations/comments: Clear view of the upper airway      8-Point Penetration-Aspiration Scale   Thin liquid 2 - Material enters the airway, remains above the vocal folds, and is ejected  from the airway   Nectar thick liquid 2 - Material enters the airway, remains above the vocal folds, and is ejected  from the airway   Honey thick liquid 1 - Material does not enter the airway   Puree (pudding) 1 - Material does not enter the airway   Solid 1 - Material does not enter the airway     Strategies and Efficacy: n/a    Aspiration Response and Efficacy: no aspiration     MBS IMP Rating    ORAL Impairment  Compinent 1--Lip Closure  Judged at any point during the swallow    0 - No labial escape    Component 2--Tongue Control During Bolus Hold  Judged on held liquid boluses only and prior to productive tongue movement  0 - Cohesive bolus between tongue to palatal seal    Component 3--Bolus Preparation/Mastication  Judged only during presentation of 1/2 shortbread cookie coated in pudding  0 - Timely and efficient chewing and mashing    Component 4--Bolus Transport/Lingual Motion  Judged after first productive tongue movement for oral bolus transport  0 - Brisk tongue motion    Component 5--Oral Residue  Judged after first swallow or after the last swallow of the sequential swallow task  0 - Complete oral clearance     Component 6--Initiation of Pharyngeal Swallow  Judged at first movement of the brisk superior-anterior hyoid trajectory  1 - Bolus Head in valleculae      PHARYNGEAL Impairment  Component 7--Soft Palate Elevation  Judged during maximum displacement of soft palate  0 - No bolus between the soft palate (SP)/pharyngeal wall (PW)    Component 8--Laryngeal Elevation  Judged when epiglottis is in its most horizontal position  0 - Complete superior movement of thyroid cartilage with complete approximation of arytenoids to epiglottic petiole    Component 9--Anterior Hyoid Excursion  Judged at height of swallow/maximal anterior hyoid displacement  0 - Complete anterior movement    Component 10--Epiglottic Movement  Judged at height of swallow/maximal anterior hyoid displacement  0 - Complete inversion    Component 11--Laryngeal Vestibular Closure  Judged at height of swallow/maximal anterior hyoid displacement  1 - Incomplete; narrow column air/contrast in laryngeal vestibule    Component 12--Pharyngeal Stripping Wave  Judged during the full duration of the pharyngeal swallow  0 - Present - complete    Component 13--Pharyngeal Contraction  Judged in AP view at rest and throughout maximum movement of structures    0 - Complete    Component 14--Pharyngoesophageal Segment Opening  Judged during maximum distension of PES and throughout opening and closure  0 - Complete distension and complete duration; no obstruction of flow    Component 15--Tongue Base (TB) Retraction  Judged during maximum retraction of the tongue base  0 - No contrast between TB and posterior pharyngeal wall (PW)    Component 16--Pharyngeal Residue  Judged after first swallow or after the last swallow of the sequential swallow task    0 - Complete pharyngeal clearance     ESOPHAGEAL Impairment  Component 17--Esophageal Clearance Upright Position  Judged in AP view during bolus transit through the oral cavity to the LES  0 - Complete clearance; esophageal coating (viewed in lateral position)

## 2023-05-04 NOTE — PLAN OF CARE
Problem: PHYSICAL THERAPY ADULT  Goal: Performs mobility at highest level of function for planned discharge setting  See evaluation for individualized goals  Description: Treatment/Interventions: ADL retraining, Functional transfer training, LE strengthening/ROM, Elevations, Therapeutic exercise, Endurance training, Patient/family training, Cognitive reorientation, Equipment eval/education, Bed mobility, Gait training, Spoke to nursing, Spoke to case management, OT          See flowsheet documentation for full assessment, interventions and recommendations  Outcome: Progressing  Note: Prognosis: Fair  Problem List: Decreased strength, Decreased range of motion, Decreased endurance, Impaired balance, Decreased mobility, Decreased cognition, Impaired judgement, Decreased coordination, Decreased safety awareness, Pain  Assessment: Pt seen for PT treatment session this date  Therapy session focused on gait training, transfer training and sitting/standing tolerance in order to improve overall mobility and independence  Pt requires assist x1 for all functional mobility  Pt initially required Mod Ax1 for STS transfer and progressed to [address] [place] [address] and then to supervision with increased repetitions  Pt completed x5 STS t/o PT session  Pt increased ambulation distance to 8' w/ RW and Min Ax1  Further ambulation limited 2* increased anxiety with upright activities  Pt demonstrated increased motivation to participate in therapy session and improve ambulation distance  Pt making good progress toward goals  Pt was left supine in bed with call bell at the end of PT session with all needs in reach  Pt would benefit from continued PT services while in hospital to address remaining limitations  PT to continue treating pt and recommends post-acute rehab services pending progress  The patient's AM-PAC Basic Mobility Inpatient Short Form Raw Score is 16   A Raw score of less than or equal to 16 suggests the patient may benefit from discharge to post-acute rehabilitation services  Please also refer to the recommendation of the Physical Therapist for safe discharge planning  Barriers to Discharge: Inaccessible home environment     PT Discharge Recommendation: Post acute rehabilitation services (pending continued progress)    See flowsheet documentation for full assessment

## 2023-05-04 NOTE — PROGRESS NOTES
INTERNAL MEDICINE RESIDENCY PROGRESS NOTE     Name: Bridget Lo   Age & Sex: 63 y o  male   MRN: [id number]  Unit/Bed#: [facility] 901-01   Encounter: [id number]  Team: SOD Team A    PATIENT INFORMATION     Name: Bridget Lo   Age & Sex: 63 y o  male   MRN: [id number]  Hospital Stay Days: 9    ASSESSMENT/PLAN     Principal Problem:    Encephalopathy  Active Problems:    Acute respiratory failure with hypoxia (HCC)    Macrocytic anemia    Crohn disease ([facility] )    Coronary artery disease involving native coronary artery of native heart without angina pectoris    Alcohol abuse    Cigarette nicotine dependence without complication    End stage COPD ([facility] )    Prediabetes    Abnormal liver function      Acute respiratory failure with hypoxia [facility])  Assessment & Plan  Patient presented with acute hypoxia and increased work of breathing  Required continuous bipap and ultimately intubated for airway protection in the setting of treating alcohol withdrawal  Sputum culture growing candida, however, per ICU sign out likely contaminant and no need to treat  Extubated on 4/28 to nasal cannula and has been titrated down to 2L  Patient's daughter reports that patient does have O2 at home, but he never uses it  Stage IV COPD based on outpatient documentation  -5/1 AM patient in increased respiratory distress, accessory muscle use on 6L NC- back on BIPAP  -Etiology unclear - possibly 2/2 metabolic derangements in setting of alcohol ketoacidosis vs  COPD vs  Underlying infection  -Completed 5 days of Cefepime and Vancomycin     Plan:  -Weaning patient, currently on 4L NC   -Goal to wean to ~2L-room air    -Will require home O2 at discharge  -Bipap at night     -CXR 5/1: Persistent retrocardiac atelectasis/mild airspace disease   -Prednisone 40 mg, S/P solumedrol    -Continue to titrate O2 to maintain SpO2 >88%  -Continue atrovent, performist, and pulmicort  -PRN albuterol    Macrocytic anemia  Assessment & Plan  Hemoglobin on presentation 12 1 with unclear baseline  Previous labs done in 2018 demonstrate hemoglobin close to 8  With adequate fluid resuscitation patient's hemoglobin dropped to 7-8, which is likely closer to his baseline    B12 within normal limits  Folate significantly low at 2 3  No S/S of active bleed at this time  Plan:  -HGB 7 2 -> 8 2 -> 8 7   -S/P 1U PRBC on 5/3   -continue daily folate supplementation  -monitor daily CBC  -Transfuse for Hgb <7 0    * Encephalopathy  Assessment & Plan  Since extubation, patient has been AAOx1 only  Able to state his name, but is unable to state location, date, situation  Daughter reports he also was unable to identify her when she visited on 4/28  His baseline is AAOx4 without cognitive deficit  Etiology unclear at this time, possibly 2/2 withdrawal vs  Wernicke's vs  Metabolic abnormalities vs  anoxic injury in setting of hypotension  [address] on presentation negative  Infectious workup has also been negative  vEEG negative  Plan:  -Oriented at this time   -Continue to monitor at this time with recent D/C of many CNS altering medications  -Delirium precautions  -Promote sleep/wake cycle  -Avoid metabolic derangements  -Continue thiamine and folate supplementation     Abnormal liver function  Assessment & Plan  Patient presented with , ALT 66, alk phos 263, and T  bili 1 10 with unclear baseline as patient without recent CMP to evaluate  CT of the chest abdomen pelvis completed on 4/25 significant for hepatomegaly and severe hepatic steatosis  Chronic hepatitis panel completed was negative  Right upper quadrant ultrasound done on 4/27 demonstrated hepatomegaly with severe hepatic steatosis without concern for cholecystitis or signs of cirrhosis  Plan:  -T  bili 2 09 -> 2 97 today  -Given lack of abdominal pain hold off imaging at this time, trend labs    -Possibly in setting of ongoing drinking with appropriate ratio  -Continue to monitor daily CMP/INR    Prediabetes  Assessment & Plan  A1c 6 2 on presentation  Plan:   -Continue SSI with POC glucose checks  -Will need outpatient evaluation by PCP    End stage COPD ([facility] [address] )  Assessment & Plan  Patient maintained in the outpatient setting on albuterol and ipratropium    Plan:  -Continue Atrovent, Pulmicort, and Performist  -Prednisone taper stopped due to respiratory distress   -Now on Prednisone 40 mg daily   -Continue to titrate supplemental O2 to maintain saturation greater than 88%    Cigarette nicotine dependence without complication  Assessment & Plan  Plan:  -Continue nicotine patch    Alcohol abuse  Assessment & Plan  Patient with significant history of alcohol use  Daughter reports drinks from the time he wakes up to the time he sleeps, possibly consuming 8 glasses of apple kenji daily  Ethanol on presentation 45 with negative UDS and ehtylene glycol  Last drink on 4/25 prior to arrival to hospital  Patient managed in ICU with versed, precedex, and propofol and ultimately treated with phenobarbital (4/25: 150 mg, 4/26: 230/650/130/130, 4/27: 130/260, 4/28: 130), which has now been discontinued  With continued encephalopathy, patient underwent vEEG demonstrating mild diffuse cerebral dysfunction of nonspecific etiology  No electrographic seizures or interictal epileptiform discharges were seen      Plan:   -Thiamine and folate ordered  -Monitor for S/S of withdrawal  -S/P IV Thiamine  Coronary artery disease involving native coronary artery of native heart without angina pectoris  Assessment & Plan  -Continue aspirin 81mg and atorvastatin 40mg daily    Crohn disease ([facility] [address] )  Assessment & Plan  Patient with history of Crohn disease  complicated by transverse colon perforation requiring resection and ileostomy with reversal in 2018    Currently prescribed sulfasalazine in the outpatient setting, however, daughter reports patient not taking his medications at this time    Plan:  -Continue to monitor  -Will need outpatient GI versus colorectal surgery follow-up    Hyperammonemia (HCC)-resolved as of 2023  Assessment & Plan  Initial ammonia 82  Patient without history of cirrhosis  Was treated with lactulose in the ICU, which has been discontinued  No need to further trend ammonia  Shock (HCC)-resolved as of 2023  Assessment & Plan  Unclear source of shock - septic vs  Hypovolemic in setting of diarrhea  Received sepsis bolus  Was supported on levophed and vasopressin, which were discontinued today  Patient treated with 5 days of cefepime and vancomycin  ID consulted and recommended discontinuing antibiotics without clear sign/source of infection  DEBBY (acute kidney injury) (HCC)-resolved as of 2023  Assessment & Plan  Likely in setting of hypovolemia/prerenal in setting of alcohol ketoacidosis  Presented with Cr 2 87 that has improved to 1 34 today  Unclear baseline    Plan:  -Cr 1 1 -> 1 09  -Continue to monitor daily BMP  -Avoid nephrotoxic agents    High anion gap metabolic acidosis-resolved as of 2023  Assessment & Plan  Likely secondary to lactic acidosis      Disposition: Patient is on 4 L/min mid flow nasal cannula, currently being weaned down  SUBJECTIVE     Patient seen and examined  No acute events overnight  This AM patient is oriented  Patient continues to have nonproductive cough  No blood in urine or feces observed  3/10 abdomen pain  Denies N/V/D, SOB, chest pain or tightness  ROS otherwise negative, no other concerns at this time       OBJECTIVE     Vitals:    23 0318 23 0717 23 0753 23 1316   BP:   103/61    BP Location:       Pulse:   88    Resp:       Temp:   98 6 °F (37 °C)    TempSrc:       SpO2: 95% 94% 90% 93%   Weight:       Height:          Temperature:   Temp (24hrs), Av 2 °F (36 8 °C), Min:97 8 °F (36 6 °C), Max:98 8 °F (37 1 °C)    Temperature: 98 6 °F (37 °C)  Intake & Output:  I/O        07 07 0701  05/04 0700 05/04 0701  05/05 0700    P  O  0      Blood  350     IV Piggyback  150     Total Intake(mL/kg) 0 (0) 500 (6 4)     Urine (mL/kg/hr) 0 (0)      Stool 1500 500     Total Output 1500 500     Net -1500 0            Unmeasured Urine Occurrence 2 x 2 x         Weights:   IBW (Ideal Body Weight): 63 8 kg    Body mass index is 27 75 kg/m²  Weight (last 2 days)     Date/Time Weight    05/03/23 0444 78 (171 96)    05/02/23 0600 78 4 (172 84)        Physical Exam  Vitals reviewed  Constitutional:       General: He is not in acute distress  Appearance: Normal appearance  He is normal weight  He is not ill-appearing, toxic-appearing or diaphoretic  HENT:      Head: Normocephalic and atraumatic  Cardiovascular:      Rate and Rhythm: Normal rate and regular rhythm  Heart sounds: Normal heart sounds  No murmur heard  No friction rub  No gallop  Pulmonary:      Effort: Pulmonary effort is normal  No respiratory distress  Breath sounds: No stridor  Wheezing present  No rhonchi or rales  Comments: Slight wheeze in the upper right lobe  Chest:      Chest wall: No tenderness  Abdominal:      Tenderness: There is no abdominal tenderness  There is no guarding or rebound  Skin:     General: Skin is warm and dry  Coloration: Skin is not jaundiced or pale  Findings: No erythema  Neurological:      Mental Status: He is alert and oriented to person, place, and time  Psychiatric:         Mood and Affect: Mood normal          Behavior: Behavior normal          Thought Content: Thought content normal        LABORATORY DATA     Labs: I have personally reviewed pertinent reports    Results from last 7 days   Lab Units 05/04/23  0453 05/03/23  1631 05/03/23  0928 05/03/23  0606 05/03/23  0438 05/02/23  0457 04/30/23  0933 04/30/23  0455   WBC Thousand/uL 11 92*  --   --   --  10 44* 13 66*   < > 16 93*   HEMOGLOBIN g/dL 8 7* 8 2* 7 2*   < > 6 9* 7 9*   < > 6 8*   HEMATOCRIT % 28 5* 25 9* 22 9*   < > 21 3* 24 8*   < > 21 6*   PLATELETS Thousands/uL 225  --   --   --  172 143*   < > 93*   NEUTROS PCT % 77*  --   --   --   --   --   --   --    MONOS PCT % 9  --   --   --   --   --   --   --    MONO PCT %  --   --   --   --   --   --   --  6    < > = values in this interval not displayed  Results from last 7 days   Lab Units 05/04/23 0453 05/03/23 0928 05/03/23 0438 05/02/23  0457   POTASSIUM mmol/L 3 8 3 8 3 6 3 8   CHLORIDE mmol/L 105 104 104 107   CO2 mmol/L 31 30 30 33*   BUN mg/dL 14 12 12 14   CREATININE mg/dL 0 87 0 87 0 84 0 99   CALCIUM mg/dL 7 7* 7 8* 7 8* 8 4   ALK PHOS U/L 217*  --  222* 255*   ALT U/L 49  --  52 65   AST U/L 56*  --  60* 88*     Results from last 7 days   Lab Units 05/04/23 0453 05/03/23  0928 05/03/23  0438   MAGNESIUM mg/dL 2 7* 1 9 1 9     Results from last 7 days   Lab Units 05/04/23 0453 05/03/23  0438 05/02/23  0457   PHOSPHORUS mg/dL 2 0* 2 5 1 3*      Results from last 7 days   Lab Units 04/28/23  0443   INR  1 32*     Results from last 7 days   Lab Units 05/01/23  0815   LACTIC ACID mmol/L 1 4           IMAGING & DIAGNOSTIC TESTING     Radiology Results: I have personally reviewed pertinent reports  XR chest portable    Result Date: 4/30/2023  Impression: Mild central vascular prominence and increase interstitial and patchy densities in the right lower lung field, as seen previously  Left pleural thickening/trace effusion  Left basilar retrocardiac atelectasis/small infiltrate  Workstation performed: [id number]     XR abdomen 1 view kub    Result Date: 4/26/2023  Impression: Nasogastric tube tip terminates at the mid stomach  Possible mild small bowel distention in the left mid abdomen, nonspecific  This can be reassessed on follow-up  Workstation performed: [id number]     XR abdomen 1 view kub    Result Date: 4/26/2023  Impression: Nasogastric tube has been inserted, tip seen passing towards the stomach, distal end not fully seen   Workstation performed: [id number]     CT head without contrast    Result Date: 4/25/2023  Impression: No acute intracranial abnormality  Workstation performed: [id number]     CTA dissection protocol chest abdomen pelvis w wo contrast    Result Date: 4/25/2023  Impression: 1  No aortic dissection, intramural hematoma, or aortic aneurysm  Patent mesenteric vessels  2  Circumferential mural thickening of the esophagus with an air-fluid level, which may relate to esophagitis and esophageal dysmotility  3  Hepatomegaly with severe hepatic steatosis  I personally discussed this study with Dr Jozef Slaughter on 4/25/2023 1:15 PM at 4/25/2023 1:36 PM  Workstation performed: [id number]     XR chest portable ICU    Result Date: 4/30/2023  Impression: Right lower lobe patchy opacity concerning for possible infiltrate  Workstation performed: [id number]     XR chest portable ICU    Result Date: 4/28/2023  Impression: No acute cardiopulmonary disease  Lines and tubes well-positioned with no pneumothorax  Workstation performed: [id number]     XR chest portable ICU    Result Date: 4/26/2023  Impression: No active pulmonary disease  Workstation performed: [id number]     XR chest portable ICU    Result Date: 4/26/2023  Impression: Right IJ line tip has been placed, tip overlies the SVC  No active pulmonary disease  Workstation performed: [id number]     US right upper quadrant    Result Date: 4/27/2023  Impression: 1  Hepatomegaly with severe hepatic steatosis suggested  2   Visualized portions of the pancreas are normal  3   Minimal cholelithiasis  Workstation performed: [id number]     Other Diagnostic Testing: I have personally reviewed pertinent reports      ACTIVE MEDICATIONS     Current Facility-Administered Medications   Medication Dose Route Frequency   • albuterol inhalation solution 2 5 mg  2 5 mg Nebulization Q4H PRN   • budesonide (PULMICORT) inhalation solution 0 5 mg  0 5 mg Nebulization Q12H   • enoxaparin (LOVENOX) "subcutaneous injection 40 mg  40 mg Subcutaneous Daily   • folic acid 1 mg in sodium chloride 0 9 % 50 mL IVPB  1 mg Intravenous Daily   • formoterol (PERFOROMIST) nebulizer solution 20 mcg  20 mcg Nebulization Q12H   • guaiFENesin (ROBITUSSIN) oral liquid 200 mg  200 mg Oral Q4H PRN   • insulin lispro (HumaLOG) 100 units/mL subcutaneous injection 2-12 Units  2-12 Units Subcutaneous 4x Daily (AC & HS)   • ipratropium (ATROVENT) 0 02 % inhalation solution 0 5 mg  0 5 mg Nebulization TID   • levalbuterol (XOPENEX) inhalation solution 1 25 mg  1 25 mg Nebulization TID   • levothyroxine tablet 50 mcg  50 mcg Oral Early Morning   • loperamide (IMODIUM) oral liquid 2 mg  2 mg Oral BID   • midodrine (PROAMATINE) tablet 10 mg  10 mg Oral TID AC   • moisture barrier miconazole 2% cream (aka ANSHU MOISTURE BARRIER ANTIFUNGAL CREAM)   Topical BID   • multivitamin stress formula tablet 1 tablet  1 tablet Oral Daily   • nicotine (NICODERM CQ) 21 mg/24 hr TD 24 hr patch 21 mg  21 mg Transdermal Daily   • pantoprazole (PROTONIX) EC tablet 40 mg  40 mg Oral Daily Before Breakfast   • predniSONE tablet 40 mg  40 mg Oral Daily   • psyllium (METAMUCIL) 1 packet  1 packet Oral Daily   • thiamine tablet 100 mg  100 mg Oral Daily       VTE Pharmacologic Prophylaxis: Enoxaparin (Lovenox)  VTE Mechanical Prophylaxis: sequential compression device    Portions of the record may have been created with voice recognition software  Occasional wrong word or \"sound a like\" substitutions may have occurred due to the inherent limitations of voice recognition software    Read the chart carefully and recognize, using context, where substitutions have occurred   ==  Candance Share, MD  [facility]  Internal Medicine Residency PGY-1  "

## 2023-05-04 NOTE — RESPIRATORY THERAPY NOTE
Resp care   05/04/23 0717   Inhalation Therapy Tx   $ Inhalation Therapy Performed Yes   $ Pulse Oximetry Spot Check Charge Completed   SpO2 94 %   Pre-Treatment Pulse 87   Pre-Treatment Respirations 18   Duration 20   Breath Sounds Pre-Treatment Bilateral Coarse   Delivery Source UDN;Oxygen   Position Semi Alford's   Treatment Tolerance Tolerated well   Resp Comments Pt found on 8L nc sating at 96% spo2  fio2 was turned down to 4L nc and pt is sating at 94% spo2  bs are coarse, udn tx given  will continue to monitor per resp protocol

## 2023-05-04 NOTE — ASSESSMENT & PLAN NOTE
Hemoglobin on presentation 12 1 with unclear baseline  Previous labs done in 2018 demonstrate hemoglobin close to 8  With adequate fluid resuscitation patient's hemoglobin dropped to 7-8, which is likely closer to his baseline    B12 within normal limits  Folate significantly low at 2 3  No S/S of active bleed at this time      Plan:  -HGB 7 2 -> 8 2 -> 8 7   -S/P 1U PRBC on 5/3   -continue daily folate supplementation  -monitor daily CBC  -Transfuse for Hgb <7 0

## 2023-05-04 NOTE — PLAN OF CARE
Problem: OCCUPATIONAL THERAPY ADULT  Goal: Performs self-care activities at highest level of function for planned discharge setting  See evaluation for individualized goals  Description: Treatment Interventions: ADL retraining, Functional transfer training, UE strengthening/ROM, Endurance training, Cognitive reorientation, Patient/family training, Equipment evaluation/education, Compensatory technique education, Continued evaluation, Energy conservation, Activityengagement          See flowsheet documentation for full assessment, interventions and recommendations  Outcome: Progressing  Note: Limitation: Decreased ADL status, Decreased UE strength, Decreased Safe judgement during ADL, Decreased cognition, Decreased endurance, Decreased self-care trans, Decreased high-level ADLs  Prognosis: Fair  Assessment: Patient participated in Skilled OT session this date with interventions consisting of ADL re training with the use of correct body mechnaics, Energy Conservation techniques,  therapeutic activities to: increase activity tolerance, increase dynamic sit/ stand balance during functional activity  and increase OOB/ sitting tolerance   Pt initially required MOD A for STS, progressed to min A, then to supervision  Pt initially requires MIN A for fnxl mobility, progressed to CGA with RW  Pt requires MAX A for LBD, S for UBD  Patient continues to be functioning below baseline level, occupational performance remains limited secondary to factors listed above and increased risk for falls and injury  From OT standpoint, recommendation at time of d/c would be home with skilled therapy - pending progress and cognition  Patient to benefit from continued Occupational Therapy treatment while in the hospital to address deficits as defined above and maximize level of functional independence with ADLs and functional mobility  Pt was left after session with all current needs met     The patient's raw score on the AM-PAC Daily Activity Inpatient Short Form is 14  A raw score of less than 19 suggests the patient may benefit from discharge to post-acute rehabilitation services  Please refer to the recommendation of the Occupational Therapist for safe discharge planning       OT Discharge Recommendation: Home with home health rehabilitation (pending progress and cognition)

## 2023-05-04 NOTE — PLAN OF CARE
Problem: MOBILITY - ADULT  Goal: Maintain or return to baseline ADL function  Description: INTERVENTIONS:  -  Assess patient's ability to carry out ADLs; assess patient's baseline for ADL function and identify physical deficits which impact ability to perform ADLs (bathing, care of mouth/teeth, toileting, grooming, dressing, etc )  - Assess/evaluate cause of self-care deficits   - Assess range of motion  - Assess patient's mobility; develop plan if impaired  - Assess patient's need for assistive devices and provide as appropriate  - Encourage maximum independence but intervene and supervise when necessary  - Involve family in performance of ADLs  - Assess for home care needs following discharge   - Consider OT consult to assist with ADL evaluation and planning for discharge  - Provide patient education as appropriate  Outcome: Progressing  Goal: Maintains/Returns to pre admission functional level  Description: INTERVENTIONS:  - Perform BMAT or MOVE assessment daily    - Set and communicate daily mobility goal to care team and patient/family/caregiver     - Collaborate with rehabilitation services on mobility goals if consulted  Outcome: Progressing     Problem: Prexisting or High Potential for Compromised Skin Integrity  Goal: Skin integrity is maintained or improved  Description: INTERVENTIONS:  - Identify patients at risk for skin breakdown  - Assess and monitor skin integrity  - Assess and monitor nutrition and hydration status  - Monitor labs   - Assess for incontinence   - Turn and reposition patient  - Assist with mobility/ambulation  - Relieve pressure over bony prominences  - Avoid friction and shearing  - Provide appropriate hygiene as needed including keeping skin clean and dry  - Evaluate need for skin moisturizer/barrier cream  - Collaborate with interdisciplinary team   - Patient/family teaching  - Consider wound care consult   Outcome: Progressing     Problem: CARDIOVASCULAR - ADULT  Goal: Maintains optimal cardiac output and hemodynamic stability  Description: INTERVENTIONS:  - Monitor I/O, vital signs and rhythm  - Monitor for S/S and trends of decreased cardiac output  - Administer and titrate ordered vasoactive medications to optimize hemodynamic stability  - Assess quality of pulses, skin color and temperature  - Assess for signs of decreased coronary artery perfusion  - Instruct patient to report change in severity of symptoms  Outcome: Progressing     Problem: RESPIRATORY - ADULT  Goal: Achieves optimal ventilation and oxygenation  Description: INTERVENTIONS:  - Assess for changes in respiratory status  - Assess for changes in mentation and behavior  - Position to facilitate oxygenation and minimize respiratory effort  - Oxygen administered by appropriate delivery if ordered  - Initiate smoking cessation education as indicated  - Encourage broncho-pulmonary hygiene including cough, deep breathe, Incentive Spirometry  - Assess the need for suctioning and aspirate as needed  - Assess and instruct to report SOB or any respiratory difficulty  - Respiratory Therapy support as indicated  Outcome: Progressing     Problem: GENITOURINARY - ADULT  Goal: Maintains or returns to baseline urinary function  Description: INTERVENTIONS:  - Assess urinary function  - Encourage oral fluids to ensure adequate hydration if ordered  - Administer IV fluids as ordered to ensure adequate hydration  - Administer ordered medications as needed  - Offer frequent toileting  - Follow urinary retention protocol if ordered  Outcome: Progressing  Goal: Absence of urinary retention  Description: INTERVENTIONS:  - Assess patient’s ability to void and empty bladder  - Monitor I/O  - Bladder scan as needed  - Discuss with physician/AP medications to alleviate retention as needed  - Discuss catheterization for long term situations as appropriate  Outcome: Progressing     Problem: METABOLIC, FLUID AND ELECTROLYTES - ADULT  Goal: Electrolytes maintained within normal limits  Description: INTERVENTIONS:  - Monitor labs and assess patient for signs and symptoms of electrolyte imbalances  - Administer electrolyte replacement as ordered  - Monitor response to electrolyte replacements, including repeat lab results as appropriate  - Instruct patient on fluid and nutrition as appropriate  Outcome: Progressing  Goal: Fluid balance maintained  Description: INTERVENTIONS:  - Monitor labs   - Monitor I/O and WT  - Instruct patient on fluid and nutrition as appropriate  - Assess for signs & symptoms of volume excess or deficit  Outcome: Progressing  Goal: Glucose maintained within target range  Description: INTERVENTIONS:  - Monitor Blood Glucose as ordered  - Assess for signs and symptoms of hyperglycemia and hypoglycemia  - Administer ordered medications to maintain glucose within target range  - Assess nutritional intake and initiate nutrition service referral as needed  Outcome: Progressing     Problem: SKIN/TISSUE INTEGRITY - ADULT  Goal: Skin Integrity remains intact(Skin Breakdown Prevention)  Description: Assess:  -Perform Itz assessment every shift  -Clean and moisturize skin every shift  -Inspect skin when repositioning, toileting, and assisting with ADLS  -Assess under medical devices such as shift every 2  -Assess extremities for adequate circulation and sensation     Bed Management:  -Have minimal linens on bed & keep smooth, unwrinkled  -Change linens as needed when moist or perspiring  -Avoid sitting or lying in one position for more than 2 hours while in bed  -Keep HOB at 30 degrees     Toileting:  -Offer bedside commode  -Assess for incontinence every 2  -Use incontinent care products after each incontinent episode such as moisture barrier    Activity:  -Mobilize patient 3 times a day  -Encourage activity and walks on unit  -Encourage or provide ROM exercises   -Turn and reposition patient every 2 Hours  -Use appropriate equipment to lift or move patient in bed  -Instruct/ Assist with weight shifting every 3 when out of bed in chair  -Consider limitation of chair time 3 hour intervals    Skin Care:  -Avoid use of baby powder, tape, friction and shearing, hot water or constrictive clothing  -Relieve pressure over bony prominences using chair pad  -Do not massage red bony areas    Next Steps:  -Teach patient strategies to minimize risks such as repositioning self   -Consider consults to  interdisciplinary teams such as PT  Outcome: Progressing  Goal: Incision(s), wounds(s) or drain site(s) healing without S/S of infection  Description: INTERVENTIONS  - Assess and document dressing, incision, wound bed, drain sites and surrounding tissue  - Provide patient and family education  - Perform skin care/dressing changes every shift  Outcome: Progressing  Goal: Pressure injury heals and does not worsen  Description: Interventions:  - Implement low air loss mattress or specialty surface (Criteria met)  - Apply silicone foam dressing  - Instruct/assist with weight shifting every 30 minutes when in chair   - Limit chair time to 3 hour intervals  - Use special pressure reducing interventions such as pad when in chair   - Apply fecal or urinary incontinence containment device   - Perform passive or active ROM every shift  - Turn and reposition patient & offload bony prominences every 2 hours   - Utilize friction reducing device or surface for transfers   - Consider consults to  interdisciplinary teams such as PT  - Use incontinent care products after each incontinent episode such as moisture barrier  - Consider nutrition services referral as needed  Outcome: Progressing     Problem: HEMATOLOGIC - ADULT  Goal: Maintains hematologic stability  Description: INTERVENTIONS  - Assess for signs and symptoms of bleeding or hemorrhage  - Monitor labs  - Administer supportive blood products/factors as ordered and appropriate  Outcome: Progressing

## 2023-05-04 NOTE — RESTORATIVE TECHNICIAN NOTE
Restorative Technician Note      Patient Name: Gisel Gordillo     [facility] Tech Visit Date: 05/04/23  Note Type: Mobility  Patient Position Upon Consult: Supine  Activity Performed: Repositioned  Patient Position at End of Consult: Supine;  All needs within [facility]

## 2023-05-04 NOTE — ASSESSMENT & PLAN NOTE
Patient with significant history of alcohol use  Daughter reports drinks from the time he wakes up to the time he sleeps, possibly consuming 8 glasses of apple kenji daily  Ethanol on presentation 47 with negative UDS and ehtylene glycol  Last drink on 4/25 prior to arrival to hospital  Patient managed in ICU with versed, precedex, and propofol and ultimately treated with phenobarbital (4/25: 150 mg, 4/26: 230/650/130/130, 4/27: 130/260, 4/28: 130), which has now been discontinued  With continued encephalopathy, patient underwent vEEG demonstrating mild diffuse cerebral dysfunction of nonspecific etiology  No electrographic seizures or interictal epileptiform discharges were seen      Plan:   -Thiamine and folate ordered  -Monitor for S/S of withdrawal  -S/P IV Thiamine

## 2023-05-04 NOTE — OCCUPATIONAL THERAPY NOTE
"  Occupational Therapy Progress Note     Patient Name: Deepti Trimble  Today's Date: 5/4/2023  Problem List  Principal Problem:    Encephalopathy  Active Problems:    Crohn disease ([facility] [address] )    Coronary artery disease involving native coronary artery of native heart without angina pectoris    Alcohol abuse    Cigarette nicotine dependence without complication    Macrocytic anemia    Acute respiratory failure with hypoxia ([facility] [address] )    End stage COPD ([facility] )    Prediabetes    Abnormal liver function            05/04/23 1204   OT Last Visit   OT Visit Date 05/04/23   Note Type   Note Type Treatment   Pain Assessment   Pain Assessment Tool 0-10   Pain Score 8   Pain Location/Orientation Location: [address] [place] [address] Pain Intervention(s) Repositioned; Ambulation/increased activity   Restrictions/Precautions   Weight Bearing Precautions Per Order No   Other Precautions Cognitive; Chair Alarm; Bed Alarm;Multiple lines;O2;Fall Risk;Pain  (rectal tube)   Lifestyle   Autonomy Per chart, PTA, pt was I with ADLs and fnxl mobility, unclear as to whether pt is able to complete IADLs, will follow up with CM  (-)    Reciprocal Relationships Dtr, chelle   Service to Others Unemployed   Intrinsic Gratification \"not much\"   ADL   UB Dressing Assistance 5  Supervision/Setup   UB Dressing Deficit Thread RUE; Thread LUE   LB Dressing Assistance 2  Maximal Assistance   LB Dressing Deficit Don/doff L sock; Don/doff R sock   Bed Mobility   Supine to Sit 4  Minimal assistance   Additional items Assist x 1;Bedrails;HOB elevated; Increased time required;LE management   Sit to Supine 4  Minimal assistance   Additional items Assist x 1;HOB elevated; Bedrails; Increased time required;LE management   Transfers   Sit to Stand 5  Supervision   Additional items Increased time required   Stand to Sit 5  Supervision   Additional items Increased time required   Additional Comments transfers with RW  Pt initially requires MOD A, then MIN A, then S     Functional " Mobility   Functional Mobility 4  Minimal assistance   Additional Comments Pt initially requires MIN A, progressed to CGA   Additional items Rolling walker   Cognition   Overall Cognitive Status Impaired   Arousal/Participation Responsive; Cooperative   Attention Attends with cues to redirect   Orientation Level Oriented to person;Oriented to place  (oriented to year)   Memory Decreased short term memory   Following Commands Follows one step commands with increased time or repetition   Comments Pt cooperative during session, some anxiety noted   Activity Tolerance   Activity Tolerance Patient limited by fatigue   Medical Staff Made Aware PT Kelsey Marina, SPT Maryan   Assessment   Assessment Patient participated in Skilled OT session this date with interventions consisting of ADL re training with the use of correct body mechnaics, Energy Conservation techniques,  therapeutic activities to: increase activity tolerance, increase dynamic sit/ stand balance during functional activity  and increase OOB/ sitting tolerance   Pt initially required MOD A for STS, progressed to min A, then to supervision  Pt initially requires MIN A for fnxl mobility, progressed to CGA with RW  Pt requires MAX A for LBD, S for UBD  Patient continues to be functioning below baseline level, occupational performance remains limited secondary to factors listed above and increased risk for falls and injury  From OT standpoint, recommendation at time of d/c would be home with skilled therapy - pending progress and cognition  Patient to benefit from continued Occupational Therapy treatment while in the hospital to address deficits as defined above and maximize level of functional independence with ADLs and functional mobility  Pt was left after session with all current needs met  The patient's raw score on the AM-PAC Daily Activity Inpatient Short Form is 14   A raw score of less than 19 suggests the patient may benefit from discharge to post-acute rehabilitation services  Please refer to the recommendation of the Occupational Therapist for safe discharge planning  Plan   Treatment Interventions ADL retraining;Functional transfer training;UE strengthening/ROM; Endurance training;Cognitive reorientation;Patient/family training;Equipment evaluation/education; Compensatory technique education;Continued evaluation; Energy conservation; Activityengagement   Goal Expiration Date 05/16/23   OT Treatment Day 1   OT Frequency 2-3x/wk   Recommendation   OT Discharge Recommendation Home with home health rehabilitation  (pending progress and cognition)   AM-PAC Daily Activity Inpatient   Lower Body Dressing 2   Bathing 2   Toileting 1   Upper Body Dressing 3   Grooming 3   Eating 3   Daily Activity Raw Score 14   Daily Activity Standardized Score (Calc for Raw Score >=11) 33 39   AM-PAC Applied Cognition Inpatient   Following a Speech/Presentation 3   Understanding Ordinary Conversation 3   Taking Medications 3   Remembering Where Things Are Placed or Put Away 2   Remembering List of 4-5 Errands 2   Taking Care of Complicated Tasks 2   Applied Cognition Raw Score 15   Applied Cognition Standardized Score 33 54     Oswald Vides MS, OTR/L

## 2023-05-04 NOTE — ASSESSMENT & PLAN NOTE
Since extubation, patient has been AAOx1 only  Able to state his name, but is unable to state location, date, situation  Daughter reports he also was unable to identify her when she visited on 4/28  His baseline is AAOx4 without cognitive deficit  Etiology unclear at this time, possibly 2/2 withdrawal vs  Wernicke's vs  Metabolic abnormalities vs  anoxic injury in setting of hypotension  [facility] on presentation negative  Infectious workup has also been negative  vEEG negative      Plan:  -Oriented at this time   -Continue to monitor at this time with recent D/C of many CNS altering medications  -Delirium precautions  -Promote sleep/wake cycle  -Avoid metabolic derangements  -Continue thiamine and folate supplementation

## 2023-05-04 NOTE — TELEPHONE ENCOUNTER
Left message for patient to schedule follow up appt with dr Tamie Leo in the [facility]

## 2023-05-04 NOTE — PHYSICAL THERAPY NOTE
PHYSICAL THERAPY NOTE      Patient Name: Josie Hurst  Today's Date: 5/4/2023 05/04/23 1202   PT Last Visit   PT Visit Date 05/04/23   Pain Assessment   Pain Assessment Tool 0-10   Pain Score 8   Pain [facility]/Orientation [facility]: [facility]   Hospital Pain Intervention(s) Repositioned; Ambulation/increased activity   Restrictions/Precautions   Weight Bearing Precautions Per Order No   Other Precautions Cognitive; Chair Alarm; Bed Alarm;Multiple lines;O2;Fall Risk;Pain  (Rectal tube, 4 L O2, Peripheral IV)   General   Family/Caregiver Present No   Cognition   Overall Cognitive Status Impaired   Attention Attends with cues to redirect   Orientation Level Oriented to person;Oriented to place; Disoriented to time  (Oriented to year, not month)   Memory Decreased recall of recent events;Decreased short term memory   Following Commands Follows one step commands with increased time or repetition   Comments Pt cooperative w/ increased motivation to participate in PT session compared to previous session  Increased anxiety during ambulation   Bed Mobility   Supine to Sit 4  Minimal assistance   Additional items Assist x 1;HOB elevated; Bedrails; Increased time required;LE management   Sit to Supine 4  Minimal assistance   Additional items Assist x 1; Increased time required;LE management;HOB elevated   Additional Comments Upon arrival, pt supine in bed  pt returned to bed with call bell and all needs within reach following PT session   Transfers   Sit to Stand 5  Supervision   Additional items Increased time required   Stand to Sit 5  Supervision   Additional items Increased time required   Additional Comments Transfers w/ RW  Initially required Mod Ax1, progressed to [facility] and then supervision  Pt completed x5 STS t/o PT session   Ambulation/Elevation   Gait pattern Narrow HANNAH; Forward Flexion;Decreased foot clearance;Shuffling;Excessively slow;Short stride   Gait Assistance 4  Minimal assist  (initially Nitish Pacheco progressed to CGA)   Additional items Assist x 1;Verbal cues   Assistive Device Rolling walker   Distance 3', 3', 8'  (seated rest breaks in between)   Ambulation/Elevation Additional Comments Ambulation limited 2* increased anxiety during ambulation trials   Balance   Static Sitting Fair +   Dynamic Sitting Fair   Static Standing Fair -   Dynamic Standing Poor +   Ambulatory Poor +   Endurance Deficit   Endurance Deficit Yes   Endurance Deficit Description anxiety, weakness   Activity Tolerance   Activity Tolerance Patient limited by fatigue   Medical Staff Made Aware OT Kathy Galeano- co-treatment 2* medical complexity and Ax2 for functional mobility   Nurse Made Aware RN cleared pt for PT session   Assessment   Prognosis Fair   Problem List Decreased strength;Decreased range of motion;Decreased endurance; Impaired balance;Decreased mobility; Decreased cognition; Impaired judgement;Decreased coordination;Decreased safety awareness;Pain   Assessment Pt seen for PT treatment session this date  Therapy session focused on gait training, transfer training and sitting/standing tolerance in order to improve overall mobility and independence  Pt requires assist x1 for all functional mobility  Pt initially required Mod Ax1 for STS transfer and progressed to Nitish Pacheco and then to supervision with increased repetitions  Pt completed x5 STS t/o PT session  Pt increased ambulation distance to 8' w/ RW and Min Ax1  Further ambulation limited 2* increased anxiety with upright activities  Pt demonstrated increased motivation to participate in therapy session and improve ambulation distance  Pt making good progress toward goals  Pt was left supine in bed with call bell at the end of PT session with all needs in reach  Pt would benefit from continued PT services while in hospital to address remaining limitations   PT to continue treating pt and recommends post-acute rehab services pending progress  The patient's AM-PAC Basic Mobility Inpatient Short Form Raw Score is 16  A Raw score of less than or equal to 16 suggests the patient may benefit from discharge to post-acute rehabilitation services  Please also refer to the recommendation of the Physical Therapist for safe discharge planning  Barriers to Discharge Inaccessible home environment   Goals   Patient Goals to get out of bed   STG Expiration Date 05/16/23   Short Term Goal #1 STG 1  Pt will be able to perform bed mobility tasks with mod I in order to improve overall functional mobility and assist in safe d/c  STG 2  Pt with sit EOB for at least 25 minutes at mod I level in order to strengthen abdominal musculature and assist in future transfers/ ambulation  STG 3  Pt will be able to perform functional transfer with Mod I in order to improve overall functional mobility and assist in safe d/c  STG 4  Pt will be able to ambulate at least 150 feet with least restrictive device with supervision A in order to improve overall functional mobility and assist in safe d/c  STG 5  Pt will improve sitting/standing static/dynamic balance 1/2 grade in order to improve functional mobility and assist in safe d/c  STG 6  Pt will improve LE strength by 1/2 grade in order to improve functional mobility and assist in safe d/c  STG 7  Pt will be able to negotiate at least 12 stairs with least restrictive device with supervision A in order to improve overall functional mobility and assist in safe d/c  (Added goals for transfers, ambulation and stair negotiation)   PT Treatment Day 1   Plan   Treatment/Interventions Functional transfer training;Elevations;LE strengthening/ROM; Therapeutic exercise; Endurance training;Cognitive reorientation;Patient/family training;Equipment eval/education; Bed mobility;Gait training;Spoke to nursing;OT   PT Frequency 2-3x/wk   Recommendation   PT Discharge Recommendation Post acute rehabilitation services  (pending continued progress)   Equipment Recommended Walker   AM-PAC Basic Mobility Inpatient   Turning in Flat Bed Without Bedrails 3   Lying on Back to Sitting on Edge of Flat Bed Without Bedrails 3   Moving Bed to Chair 3   Standing Up From Chair Using Arms 3   Walk in Room 3   Climb 3-5 Stairs With Railing 1   Basic Mobility Inpatient Raw Score 16   Basic Mobility Standardized Score 38 32   Highest Level Of Mobility   JH-HLM Goal 5: Stand one or more mins   JH-HLM Achieved 6: Walk 10 steps or more   End of Consult   Patient Position at End of Consult Supine;Bed/Chair alarm activated; All needs within reach     Ketan Fernandez SPT  3:49 PM  05/04/23

## 2023-05-04 NOTE — ASSESSMENT & PLAN NOTE
Patient presented with acute hypoxia and increased work of breathing  Required continuous bipap and ultimately intubated for airway protection in the setting of treating alcohol withdrawal  Sputum culture growing candida, however, per ICU sign out likely contaminant and no need to treat  Extubated on 4/28 to nasal cannula and has been titrated down to 2L  Patient's daughter reports that patient does have O2 at home, but he never uses it  Stage IV COPD based on outpatient documentation  -5/1 AM patient in increased respiratory distress, accessory muscle use on 6L NC- back on BIPAP  -Etiology unclear - possibly 2/2 metabolic derangements in setting of alcohol ketoacidosis vs  COPD vs  Underlying infection  -Completed 5 days of Cefepime and Vancomycin     Plan:  -Weaning patient, currently on 4L NC   -Goal to wean to ~2L-room air    -Will require home O2 at discharge  -Bipap at night     -CXR 5/1: Persistent retrocardiac atelectasis/mild airspace disease   -Prednisone 40 mg, S/P solumedrol    -Continue to titrate O2 to maintain SpO2 >88%  -Continue atrovent, performist, and pulmicort  -PRN albuterol

## 2023-05-05 LAB
ABO GROUP BLD BPU: NORMAL
ALBUMIN SERPL BCP-MCNC: 2 G/DL (ref 3.5–5)
ALP SERPL-CCNC: 216 U/L (ref 46–116)
ALT SERPL W P-5'-P-CCNC: 46 U/L (ref 12–78)
ANION GAP SERPL CALCULATED.3IONS-SCNC: 15 MMOL/L (ref 4–13)
ANION GAP SERPL CALCULATED.3IONS-SCNC: 4 MMOL/L (ref 4–13)
AST SERPL W P-5'-P-CCNC: 48 U/L (ref 5–45)
BASE EX.OXY STD BLDV CALC-SCNC: 94.5 % (ref 60–80)
BASE EXCESS BLDV CALC-SCNC: 4.1 MMOL/L
BASOPHILS # BLD AUTO: 0.02 THOUSANDS/ÂΜL (ref 0–0.1)
BASOPHILS NFR BLD AUTO: 0 % (ref 0–1)
BILIRUB SERPL-MCNC: 2.83 MG/DL (ref 0.2–1)
BPU ID: NORMAL
BUN SERPL-MCNC: 19 MG/DL (ref 5–25)
BUN SERPL-MCNC: 19 MG/DL (ref 5–25)
CALCIUM ALBUM COR SERPL-MCNC: 8.6 MG/DL (ref 8.3–10.1)
CALCIUM SERPL-MCNC: 7 MG/DL (ref 8.3–10.1)
CALCIUM SERPL-MCNC: 7.9 MG/DL (ref 8.3–10.1)
CHLORIDE SERPL-SCNC: 106 MMOL/L (ref 96–108)
CHLORIDE SERPL-SCNC: 106 MMOL/L (ref 96–108)
CO2 SERPL-SCNC: 19 MMOL/L (ref 21–32)
CO2 SERPL-SCNC: 28 MMOL/L (ref 21–32)
CREAT SERPL-MCNC: 0.81 MG/DL (ref 0.6–1.3)
CREAT SERPL-MCNC: 0.95 MG/DL (ref 0.6–1.3)
CROSSMATCH: NORMAL
EOSINOPHIL # BLD AUTO: 0.03 THOUSAND/ÂΜL (ref 0–0.61)
EOSINOPHIL NFR BLD AUTO: 0 % (ref 0–6)
ERYTHROCYTE [DISTWIDTH] IN BLOOD BY AUTOMATED COUNT: 18.8 % (ref 11.6–15.1)
GFR SERPL CREATININE-BSD FRML MDRD: 86 ML/MIN/1.73SQ M
GFR SERPL CREATININE-BSD FRML MDRD: 96 ML/MIN/1.73SQ M
GLUCOSE SERPL-MCNC: 104 MG/DL (ref 65–140)
GLUCOSE SERPL-MCNC: 105 MG/DL (ref 65–140)
GLUCOSE SERPL-MCNC: 130 MG/DL (ref 65–140)
GLUCOSE SERPL-MCNC: 140 MG/DL (ref 65–140)
GLUCOSE SERPL-MCNC: 142 MG/DL (ref 65–140)
GLUCOSE SERPL-MCNC: 86 MG/DL (ref 65–140)
HCO3 BLDV-SCNC: 28.2 MMOL/L (ref 24–30)
HCT VFR BLD AUTO: 27.2 % (ref 36.5–49.3)
HGB BLD-MCNC: 8.6 G/DL (ref 12–17)
IMM GRANULOCYTES # BLD AUTO: 0.12 THOUSAND/UL (ref 0–0.2)
IMM GRANULOCYTES NFR BLD AUTO: 1 % (ref 0–2)
LYMPHOCYTES # BLD AUTO: 0.93 THOUSANDS/ÂΜL (ref 0.6–4.47)
LYMPHOCYTES NFR BLD AUTO: 7 % (ref 14–44)
MAGNESIUM SERPL-MCNC: 2.2 MG/DL (ref 1.6–2.6)
MCH RBC QN AUTO: 32.7 PG (ref 26.8–34.3)
MCHC RBC AUTO-ENTMCNC: 31.6 G/DL (ref 31.4–37.4)
MCV RBC AUTO: 103 FL (ref 82–98)
MONOCYTES # BLD AUTO: 0.94 THOUSAND/ÂΜL (ref 0.17–1.22)
MONOCYTES NFR BLD AUTO: 7 % (ref 4–12)
NEUTROPHILS # BLD AUTO: 10.68 THOUSANDS/ÂΜL (ref 1.85–7.62)
NEUTS SEG NFR BLD AUTO: 85 % (ref 43–75)
NRBC BLD AUTO-RTO: 0 /100 WBCS
O2 CT BLDV-SCNC: 12.8 ML/DL
PCO2 BLDV: 40.2 MM HG (ref 42–50)
PH BLDV: 7.46 [PH] (ref 7.3–7.4)
PHOSPHATE SERPL-MCNC: 2.4 MG/DL (ref 2.3–4.1)
PLATELET # BLD AUTO: 253 THOUSANDS/UL (ref 149–390)
PMV BLD AUTO: 10.1 FL (ref 8.9–12.7)
PO2 BLDV: 90.2 MM HG (ref 35–45)
POTASSIUM SERPL-SCNC: 4.2 MMOL/L (ref 3.5–5.3)
POTASSIUM SERPL-SCNC: 4.8 MMOL/L (ref 3.5–5.3)
PROT SERPL-MCNC: 5.6 G/DL (ref 6.4–8.4)
RBC # BLD AUTO: 2.63 MILLION/UL (ref 3.88–5.62)
SODIUM SERPL-SCNC: 138 MMOL/L (ref 135–147)
SODIUM SERPL-SCNC: 140 MMOL/L (ref 135–147)
UNIT DISPENSE STATUS: NORMAL
UNIT PRODUCT CODE: NORMAL
UNIT PRODUCT VOLUME: 350 ML
UNIT RH: NORMAL
WBC # BLD AUTO: 12.72 THOUSAND/UL (ref 4.31–10.16)

## 2023-05-05 RX ORDER — ACETAMINOPHEN 325 MG/1
650 TABLET ORAL EVERY 8 HOURS PRN
Status: DISCONTINUED | OUTPATIENT
Start: 2023-05-05 | End: 2023-05-06 | Stop reason: HOSPADM

## 2023-05-05 RX ADMIN — PANTOPRAZOLE SODIUM 40 MG: 40 TABLET, DELAYED RELEASE ORAL at 06:13

## 2023-05-05 RX ADMIN — LEVALBUTEROL HYDROCHLORIDE 1.25 MG: 1.25 SOLUTION RESPIRATORY (INHALATION) at 19:18

## 2023-05-05 RX ADMIN — FORMOTEROL FUMARATE DIHYDRATE 20 MCG: 20 SOLUTION RESPIRATORY (INHALATION) at 08:00

## 2023-05-05 RX ADMIN — FORMOTEROL FUMARATE DIHYDRATE 20 MCG: 20 SOLUTION RESPIRATORY (INHALATION) at 19:18

## 2023-05-05 RX ADMIN — LOPERAMIDE HCL 2 MG: 1 SOLUTION ORAL at 21:41

## 2023-05-05 RX ADMIN — IPRATROPIUM BROMIDE 0.5 MG: 0.5 SOLUTION RESPIRATORY (INHALATION) at 08:00

## 2023-05-05 RX ADMIN — MICONAZOLE NITRATE: 20 CREAM TOPICAL at 08:29

## 2023-05-05 RX ADMIN — THIAMINE HCL TAB 100 MG 100 MG: 100 TAB at 08:28

## 2023-05-05 RX ADMIN — LOPERAMIDE HCL 2 MG: 1 SOLUTION ORAL at 08:29

## 2023-05-05 RX ADMIN — Medication 1 PACKET: at 08:28

## 2023-05-05 RX ADMIN — MIDODRINE HYDROCHLORIDE 10 MG: 5 TABLET ORAL at 06:13

## 2023-05-05 RX ADMIN — BUDESONIDE 0.5 MG: 0.5 INHALANT ORAL at 19:18

## 2023-05-05 RX ADMIN — MIDODRINE HYDROCHLORIDE 10 MG: 5 TABLET ORAL at 17:28

## 2023-05-05 RX ADMIN — LEVALBUTEROL HYDROCHLORIDE 1.25 MG: 1.25 SOLUTION RESPIRATORY (INHALATION) at 14:40

## 2023-05-05 RX ADMIN — LEVOTHYROXINE SODIUM 50 MCG: 50 TABLET ORAL at 08:28

## 2023-05-05 RX ADMIN — MIDODRINE HYDROCHLORIDE 10 MG: 5 TABLET ORAL at 11:54

## 2023-05-05 RX ADMIN — IPRATROPIUM BROMIDE 0.5 MG: 0.5 SOLUTION RESPIRATORY (INHALATION) at 14:40

## 2023-05-05 RX ADMIN — BUDESONIDE 0.5 MG: 0.5 INHALANT ORAL at 08:00

## 2023-05-05 RX ADMIN — MICONAZOLE NITRATE: 20 CREAM TOPICAL at 17:28

## 2023-05-05 RX ADMIN — LEVALBUTEROL HYDROCHLORIDE 1.25 MG: 1.25 SOLUTION RESPIRATORY (INHALATION) at 08:00

## 2023-05-05 RX ADMIN — ACETAMINOPHEN 650 MG: 325 TABLET ORAL at 08:28

## 2023-05-05 RX ADMIN — FOLIC ACID 1 MG: 5 INJECTION, SOLUTION INTRAMUSCULAR; INTRAVENOUS; SUBCUTANEOUS at 11:55

## 2023-05-05 RX ADMIN — ENOXAPARIN SODIUM 40 MG: 40 INJECTION SUBCUTANEOUS at 08:28

## 2023-05-05 RX ADMIN — IPRATROPIUM BROMIDE 0.5 MG: 0.5 SOLUTION RESPIRATORY (INHALATION) at 19:18

## 2023-05-05 RX ADMIN — B-COMPLEX W/ C & FOLIC ACID TAB 1 TABLET: TAB at 08:28

## 2023-05-05 RX ADMIN — PREDNISONE 40 MG: 20 TABLET ORAL at 08:28

## 2023-05-05 RX ADMIN — NICOTINE 21 MG: 21 PATCH, EXTENDED RELEASE TRANSDERMAL at 08:29

## 2023-05-05 NOTE — PROGRESS NOTES
INTERNAL MEDICINE RESIDENCY PROGRESS NOTE     Name: Delano Dolan   Age & Sex: 63 y o  male   MRN: [id number]  Unit/Bed#: [facility] 901-01   Encounter: [id number]  Team: SOD Team A    PATIENT INFORMATION     Name: Delano Dolan   Age & Sex: 63 y o  male   MRN: [id number]  Hospital Stay Days: 10    ASSESSMENT/PLAN     Principal Problem:    Encephalopathy  Active Problems:    Acute respiratory failure with hypoxia (HCC)    Macrocytic anemia    Crohn disease ([facility] )    Coronary artery disease involving native coronary artery of native heart without angina pectoris    Metabolic acidosis, increased anion gap    Alcohol abuse    Cigarette nicotine dependence without complication    End stage COPD ([facility] )    Prediabetes    Abnormal liver function      Acute respiratory failure with hypoxia [facility])  Assessment & Plan  Patient presented with acute hypoxia and increased work of breathing  Required continuous bipap and ultimately intubated for airway protection in the setting of treating alcohol withdrawal  Sputum culture growing candida, however, per ICU sign out likely contaminant and no need to treat  Extubated on 4/28 to nasal cannula and has been titrated down to 2L  Patient's daughter reports that patient does have O2 at home, but he never uses it  Stage IV COPD based on outpatient documentation  -5/1 AM patient in increased respiratory distress, accessory muscle use on 6L NC- back on BIPAP  -Etiology unclear - possibly 2/2 metabolic derangements in setting of alcohol ketoacidosis vs  COPD vs  Underlying infection  -Completed 5 days of Cefepime and Vancomycin     Plan:  -Weaning patient, currently on 4L NC   -Goal to wean to ~2L-room air    -Will require home O2 at discharge  Home O2 study    -Bipap at night     -CXR 5/1: Persistent retrocardiac atelectasis/mild airspace disease   -Prednisone 40 mg, S/P solumedrol    -Continue to titrate O2 to maintain SpO2 >88%  -Continue atrovent, performist, and pulmicort  -PRN albuterol    Macrocytic anemia  Assessment & Plan  Hemoglobin on presentation 12 1 with unclear baseline  Previous labs done in 2018 demonstrate hemoglobin close to 8  With adequate fluid resuscitation patient's hemoglobin dropped to 7-8, which is likely closer to his baseline    B12 within normal limits  Folate significantly low at 2 3  No S/S of active bleed at this time  Plan:  -HGB 7 2 -> 8 2 -> 8 7 -> 8 6   -S/P 1U PRBC on 5/3   -continue daily folate supplementation  -monitor daily CBC  -Transfuse for Hgb <7 0    * Encephalopathy  Assessment & Plan  Since extubation, patient has been AAOx1 only  Able to state his name, but is unable to state location, date, situation  Daughter reports he also was unable to identify her when she visited on 4/28  His baseline is AAOx4 without cognitive deficit  Etiology unclear at this time, possibly 2/2 withdrawal vs  Wernicke's vs  Metabolic abnormalities vs  anoxic injury in setting of hypotension  [facility] on presentation negative  Infectious workup has also been negative  vEEG negative  Plan:  -Oriented at this time   -Continue to monitor at this time with recent D/C of many CNS altering medications  -Delirium precautions  -Promote sleep/wake cycle  -Avoid metabolic derangements  -Continue thiamine and folate supplementation     Abnormal liver function  Assessment & Plan  Patient presented with , ALT 66, alk phos 263, and T  bili 1 10 with unclear baseline as patient without recent CMP to evaluate  CT of the chest abdomen pelvis completed on 4/25 significant for hepatomegaly and severe hepatic steatosis  Chronic hepatitis panel completed was negative  Right upper quadrant ultrasound done on 4/27 demonstrated hepatomegaly with severe hepatic steatosis without concern for cholecystitis or signs of cirrhosis  Plan:  -T  bili 2 09 -> 2 97 -> 2 83 today  -Given lack of abdominal pain hold off imaging at this time, trend labs    -Possibly in setting of ongoing drinking with appropriate ratio  -Continue to monitor daily CMP/INR    Prediabetes  Assessment & Plan  A1c 6 2 on presentation  Plan:   -Continue SSI with POC glucose checks  -Will need outpatient evaluation by PCP    End stage COPD [facility])  Assessment & Plan  Patient maintained in the outpatient setting on albuterol and ipratropium    Plan:  -Wean O2 as tolerated, home O2 eval   -Continue Atrovent, Pulmicort, and Performist  -Prednisone taper stopped due to respiratory distress   -Now on Prednisone 40 mg daily   -Continue to titrate supplemental O2 to maintain saturation greater than 88%    Cigarette nicotine dependence without complication  Assessment & Plan  Plan:  -Continue nicotine patch    Alcohol abuse  Assessment & Plan  Patient with significant history of alcohol use  Daughter reports drinks from the time he wakes up to the time he sleeps, possibly consuming 8 glasses of apple kenji daily  Ethanol on presentation 45 with negative UDS and ehtylene glycol  Last drink on 4/25 prior to arrival to hospital  Patient managed in ICU with versed, precedex, and propofol and ultimately treated with phenobarbital (4/25: 150 mg, 4/26: 230/650/130/130, 4/27: 130/260, 4/28: 130), which has now been discontinued  With continued encephalopathy, patient underwent vEEG demonstrating mild diffuse cerebral dysfunction of nonspecific etiology  No electrographic seizures or interictal epileptiform discharges were seen      Plan:   -Thiamine and folate ordered  -Monitor for S/S of withdrawal  -S/P IV Thiamine  Metabolic acidosis, increased anion gap  Assessment & Plan  -Prior in admission, likely secondary to lactic acidosis  -Today Co2 31 -> 19, Anion Gap 4 -> 15  Plan:  -Recheck BMP   -VBG  -Follow up closely      Coronary artery disease involving native coronary artery of native heart without angina pectoris  Assessment & Plan  -Continue aspirin 81mg and atorvastatin 40mg daily    Crohn disease ([facility] )  Assessment & Plan  Patient with history of Crohn disease  complicated by transverse colon perforation requiring resection and ileostomy with reversal in 2018  Currently prescribed sulfasalazine in the outpatient setting, however, daughter reports patient not taking his medications at this time    Plan:  -Continue to monitor  -Will need outpatient GI versus colorectal surgery follow-up    Hyperammonemia (HCC)-resolved as of 4/29/2023  Assessment & Plan  Initial ammonia 82  Patient without history of cirrhosis  Was treated with lactulose in the ICU, which has been discontinued  No need to further trend ammonia  Shock (HCC)-resolved as of 4/29/2023  Assessment & Plan  Unclear source of shock - septic vs  Hypovolemic in setting of diarrhea  Received sepsis bolus  Was supported on levophed and vasopressin, which were discontinued today  Patient treated with 5 days of cefepime and vancomycin  ID consulted and recommended discontinuing antibiotics without clear sign/source of infection  DEBBY (acute kidney injury) (HCC)-resolved as of 5/1/2023  Assessment & Plan  Likely in setting of hypovolemia/prerenal in setting of alcohol ketoacidosis  Presented with Cr 2 87 that has improved to 1 34 today  Unclear baseline    Plan:  -Cr 1 1 -> 1 09  -Continue to monitor daily BMP  -Avoid nephrotoxic agents      Disposition: Patient is on 4 L/min mid flow nasal cannula, currently being weaned down  SUBJECTIVE     Patient seen and examined  Patient endorsed abdominal and rear pain overnight due to constipation  Patient is currently having BMs  Patient continues to have nonproductive cough  Switched to clear liquids and solid food diet  Speech was slightly slurred when examined  No blood in feces observed  ROS otherwise negative, no other concerns at this time      OBJECTIVE     Vitals:    05/05/23 0253 05/05/23 0718 05/05/23 0800 05/05/23 1025   BP: 98/65 94/67     Pulse: 99 101  80   Resp:       Temp: 99 5 °F (37 5 °C) 98 6 °F (37 °C) TempSrc:       SpO2: 96% 93% 94% (!) 73%   Weight:       Height:          Temperature:   Temp (24hrs), Av 4 °F (36 9 °C), Min:97 9 °F (36 6 °C), Max:99 5 °F (37 5 °C)    Temperature: 98 6 °F (37 °C)  Intake & Output:  I/O       / 0701  05/04 0700 / 0701   0700 05/ 0701  / 0700    P  O  Blood 350      IV Piggyback 150 50     Total Intake(mL/kg) 500 (6 4) 50 (0 6)     Urine (mL/kg/hr)  300 (0 2)     Stool 500  900    Total Output 500 300 900    Net 0 -250 -900           Unmeasured Urine Occurrence 2 x 1 x         Weights:   IBW (Ideal Body Weight): 63 8 kg    Body mass index is 27 75 kg/m²  Weight (last 2 days)     Date/Time Weight    23 06 --     Weight: refused at 23 0600    23 0444 78 (171 96)        Physical Exam  Constitutional:       General: He is not in acute distress  Appearance: Normal appearance  He is not ill-appearing, toxic-appearing or diaphoretic  Comments: Speech slightly slurred  HENT:      Head: Normocephalic and atraumatic  Mouth/Throat:      Mouth: Mucous membranes are moist    Cardiovascular:      Rate and Rhythm: Normal rate and regular rhythm  Pulses: Normal pulses  Heart sounds: Normal heart sounds  No murmur heard  No friction rub  No gallop  Pulmonary:      Effort: No respiratory distress  Breath sounds: No stridor  No wheezing, rhonchi or rales  Comments: Labored breathing, possible baseline  Chest:      Chest wall: No tenderness  Abdominal:      General: Bowel sounds are normal       Tenderness: There is abdominal tenderness  There is no guarding or rebound  Comments: Slightly tender around hernia  Musculoskeletal:      Cervical back: No rigidity or tenderness  Skin:     Coloration: Skin is not jaundiced  Findings: No erythema  Neurological:      Mental Status: He is alert and oriented to person, place, and time     Psychiatric:         Behavior: Behavior normal  Thought Content: Thought content normal        LABORATORY DATA     Labs: I have personally reviewed pertinent reports  Results from last 7 days   Lab Units 05/05/23  0451 05/04/23  0453 05/03/23  1631 05/03/23  0606 05/03/23  0438 04/30/23  0933 04/30/23  0455   WBC Thousand/uL 12 72* 11 92*  --   --  10 44*   < > 16 93*   HEMOGLOBIN g/dL 8 6* 8 7* 8 2*   < > 6 9*   < > 6 8*   HEMATOCRIT % 27 2* 28 5* 25 9*   < > 21 3*   < > 21 6*   PLATELETS Thousands/uL 253 225  --   --  172   < > 93*   NEUTROS PCT % 85* 77*  --   --   --   --   --    MONOS PCT % 7 9  --   --   --   --   --    MONO PCT %  --   --   --   --   --   --  6    < > = values in this interval not displayed  Results from last 7 days   Lab Units 05/05/23  0451 05/04/23  0453 05/03/23  0928 05/03/23  0438   POTASSIUM mmol/L 4 2 3 8 3 8 3 6   CHLORIDE mmol/L 106 105 104 104   CO2 mmol/L 19* 31 30 30   BUN mg/dL 19 14 12 12   CREATININE mg/dL 0 81 0 87 0 87 0 84   CALCIUM mg/dL 7 0* 7 7* 7 8* 7 8*   ALK PHOS U/L 216* 217*  --  222*   ALT U/L 46 49  --  52   AST U/L 48* 56*  --  60*     Results from last 7 days   Lab Units 05/05/23  0451 05/04/23  0453 05/03/23  0928   MAGNESIUM mg/dL 2 2 2 7* 1 9     Results from last 7 days   Lab Units 05/05/23  0451 05/04/23  0453 05/03/23  0438   PHOSPHORUS mg/dL 2 4 2 0* 2 5          Results from last 7 days   Lab Units 05/01/23  0815   LACTIC ACID mmol/L 1 4           IMAGING & DIAGNOSTIC TESTING     Radiology Results: I have personally reviewed pertinent reports  XR chest portable    Result Date: 4/30/2023  Impression: Mild central vascular prominence and increase interstitial and patchy densities in the right lower lung field, as seen previously  Left pleural thickening/trace effusion  Left basilar retrocardiac atelectasis/small infiltrate  Workstation performed: [id number]     XR abdomen 1 view kub    Result Date: 4/26/2023  Impression: Nasogastric tube tip terminates at the mid stomach   Possible mild small bowel distention in the left mid abdomen, nonspecific  This can be reassessed on follow-up  Workstation performed: [id number]     XR abdomen 1 view kub    Result Date: 4/26/2023  Impression: Nasogastric tube has been inserted, tip seen passing towards the stomach, distal end not fully seen  Workstation performed: [id number]     CT head without contrast    Result Date: 4/25/2023  Impression: No acute intracranial abnormality  Workstation performed: [id number]     CTA dissection protocol chest abdomen pelvis w wo contrast    Result Date: 4/25/2023  Impression: 1  No aortic dissection, intramural hematoma, or aortic aneurysm  Patent mesenteric vessels  2  Circumferential mural thickening of the esophagus with an air-fluid level, which may relate to esophagitis and esophageal dysmotility  3  Hepatomegaly with severe hepatic steatosis  I personally discussed this study with Dr Knapp on 4/25/2023 1:15 PM at 4/25/2023 1:36 PM  Workstation performed: [id number]     XR chest portable ICU    Result Date: 4/30/2023  Impression: Right lower lobe patchy opacity concerning for possible infiltrate  Workstation performed: [id number]     XR chest portable ICU    Result Date: 4/28/2023  Impression: No acute cardiopulmonary disease  Lines and tubes well-positioned with no pneumothorax  Workstation performed: [id number]     XR chest portable ICU    Result Date: 4/26/2023  Impression: No active pulmonary disease  Workstation performed: [id number]     XR chest portable ICU    Result Date: 4/26/2023  Impression: Right IJ line tip has been placed, tip overlies the SVC  No active pulmonary disease  Workstation performed: [id number]     US right upper quadrant    Result Date: 4/27/2023  Impression: 1  Hepatomegaly with severe hepatic steatosis suggested  2   Visualized portions of the pancreas are normal  3   Minimal cholelithiasis   Workstation performed: [id number]     Other Diagnostic Testing: I have personally "reviewed pertinent reports  ACTIVE MEDICATIONS     Current Facility-Administered Medications   Medication Dose Route Frequency   • acetaminophen (TYLENOL) tablet 650 mg  650 mg Oral Q8H PRN   • albuterol inhalation solution 2 5 mg  2 5 mg Nebulization Q4H PRN   • budesonide (PULMICORT) inhalation solution 0 5 mg  0 5 mg Nebulization Q12H   • enoxaparin (LOVENOX) subcutaneous injection 40 mg  40 mg Subcutaneous Daily   • folic acid 1 mg in sodium chloride 0 9 % 50 mL IVPB  1 mg Intravenous Daily   • formoterol (PERFOROMIST) nebulizer solution 20 mcg  20 mcg Nebulization Q12H   • guaiFENesin (ROBITUSSIN) oral liquid 200 mg  200 mg Oral Q4H PRN   • insulin lispro (HumaLOG) 100 units/mL subcutaneous injection 2-12 Units  2-12 Units Subcutaneous 4x Daily (AC & HS)   • ipratropium (ATROVENT) 0 02 % inhalation solution 0 5 mg  0 5 mg Nebulization TID   • levalbuterol (XOPENEX) inhalation solution 1 25 mg  1 25 mg Nebulization TID   • levothyroxine tablet 50 mcg  50 mcg Oral Early Morning   • loperamide (IMODIUM) oral liquid 2 mg  2 mg Oral BID   • midodrine (PROAMATINE) tablet 10 mg  10 mg Oral TID AC   • moisture barrier miconazole 2% cream (aka ANSHU MOISTURE BARRIER ANTIFUNGAL CREAM)   Topical BID   • multivitamin stress formula tablet 1 tablet  1 tablet Oral Daily   • nicotine (NICODERM CQ) 21 mg/24 hr TD 24 hr patch 21 mg  21 mg Transdermal Daily   • pantoprazole (PROTONIX) EC tablet 40 mg  40 mg Oral Daily Before Breakfast   • predniSONE tablet 40 mg  40 mg Oral Daily   • psyllium (METAMUCIL) 1 packet  1 packet Oral Daily   • thiamine tablet 100 mg  100 mg Oral Daily       VTE Pharmacologic Prophylaxis: Enoxaparin (Lovenox)  VTE Mechanical Prophylaxis: sequential compression device    Portions of the record may have been created with voice recognition software  Occasional wrong word or \"sound a like\" substitutions may have occurred due to the inherent limitations of voice recognition software    Read the chart " carefully and recognize, using context, where substitutions have occurred   ==  [place] [facility]  Internal Medicine Residency PGY-1

## 2023-05-05 NOTE — SPEECH THERAPY NOTE
Attempted to see patient for f/u dysphagia therapy x2 but patient refused  He participated in VBS yesterday and did not present with any aspiration events across trials  Diet changed to regular  RN reports patient tolerated am pills well with water  No overt s/s aspiration observed  Will discontinue ST orders at this time  Please re-consult with concerns

## 2023-05-05 NOTE — PLAN OF CARE
Problem: RESPIRATORY - ADULT  Goal: Achieves optimal ventilation and oxygenation  Description: INTERVENTIONS:  - Assess for changes in respiratory status  - Assess for changes in mentation and behavior  - Position to facilitate oxygenation and minimize respiratory effort  - Oxygen administered by appropriate delivery if ordered  - Initiate smoking cessation education as indicated  - Encourage broncho-pulmonary hygiene including cough, deep breathe, Incentive Spirometry  - Assess the need for suctioning and aspirate as needed  - Assess and instruct to report SOB or any respiratory difficulty  - Respiratory Therapy support as indicated  Outcome: Progressing     Problem: Nutrition/Hydration-ADULT  Goal: Nutrient/Hydration intake appropriate for improving, restoring or maintaining nutritional needs  Description: Monitor and assess patient's nutrition/hydration status for malnutrition  Collaborate with interdisciplinary team and initiate plan and interventions as ordered  Monitor patient's weight and dietary intake as ordered or per policy  Utilize nutrition screening tool and intervene as necessary  Determine patient's food preferences and provide high-protein, high-caloric foods as appropriate       INTERVENTIONS:  - Monitor oral intake, urinary output, labs, and treatment plans  - Assess nutrition and hydration status and recommend course of action  - Evaluate amount of meals eaten  - Assist patient with eating if necessary   - Allow adequate time for meals  - Recommend/ encourage appropriate diets, oral nutritional supplements, and vitamin/mineral supplements  - Order, calculate, and assess calorie counts as needed  - Recommend, monitor, and adjust tube feedings and TPN/PPN based on assessed needs  - Assess need for intravenous fluids  - Provide specific nutrition/hydration education as appropriate  - Include patient/family/caregiver in decisions related to nutrition  Outcome: Not Progressing normal...

## 2023-05-05 NOTE — ASSESSMENT & PLAN NOTE
-Prior in admission, likely secondary to lactic acidosis  -Today Co2 31 -> 19, Anion Gap 4 -> 15  Plan:  -Recheck BMP   -VBG  -Follow up closely

## 2023-05-05 NOTE — PLAN OF CARE
Problem: Nutrition/Hydration-ADULT  Goal: Nutrient/Hydration intake appropriate for improving, restoring or maintaining nutritional needs  Description: Monitor and assess patient's nutrition/hydration status for malnutrition  Collaborate with interdisciplinary team and initiate plan and interventions as ordered  Monitor patient's weight and dietary intake as ordered or per policy  Utilize nutrition screening tool and intervene as necessary  Determine patient's food preferences and provide high-protein, high-caloric foods as appropriate       INTERVENTIONS:  - Monitor oral intake, urinary output, labs, and treatment plans  - Assess nutrition and hydration status and recommend course of action  - Evaluate amount of meals eaten  - Assist patient with eating if necessary   - Allow adequate time for meals  - Recommend/ encourage appropriate diets, oral nutritional supplements, and vitamin/mineral supplements  - Order, calculate, and assess calorie counts as needed  - Recommend, monitor, and adjust tube feedings and TPN/PPN based on assessed needs  - Assess need for intravenous fluids  - Provide specific nutrition/hydration education as appropriate  - Include patient/family/caregiver in decisions related to nutrition  Outcome: Not Progressing  Note: Inadequate intake

## 2023-05-05 NOTE — ASSESSMENT & PLAN NOTE
Patient presented with , ALT 66, alk phos 263, and T  bili 1 10 with unclear baseline as patient without recent CMP to evaluate  CT of the chest abdomen pelvis completed on 4/25 significant for hepatomegaly and severe hepatic steatosis  Chronic hepatitis panel completed was negative  Right upper quadrant ultrasound done on 4/27 demonstrated hepatomegaly with severe hepatic steatosis without concern for cholecystitis or signs of cirrhosis  Plan:  -T  bili 2 09 -> 2 97 -> 2 83 today  -Given lack of abdominal pain hold off imaging at this time, trend labs    -Possibly in setting of ongoing drinking with appropriate ratio  -Continue to monitor daily CMP/INR

## 2023-05-05 NOTE — RESTORATIVE TECHNICIAN NOTE
Restorative Technician Note      Patient Name: Pierre Banks     [facility] Visit Date: 05/05/23  Note Type: Mobility  Patient Position Upon Consult: Supine  Activity Performed: Ambulated  Assistive Device: Roller walker  Patient Position at End of Consult: Supine;  All needs within reach; Bed/Chair alarm activated      Vaughn Seay

## 2023-05-05 NOTE — CASE MANAGEMENT
Case Management Discharge Planning Note    Patient name Odin Au  Location [facility] 901/[facility] 901-01 MRN [id number]  : 1960 Date 2023       Current Admission Date: 2023  Current Admission Diagnosis:Encephalopathy   Patient Active Problem List    Diagnosis Date Noted   • Acute [facility] failure with [facility] ([facility] ) 2023   • End stage COPD ([facility] ) 2023   • Prediabetes 2023   • Abnormal liver function 2023   • Screening for diabetes mellitus 2019   • Status post reversal of ileostomy 10/25/2018   • Hypercalcemia 2018   • Hematemesis with nausea 2018   • Presence of drug-eluting stent in right coronary artery 2018   • Paroxysmal atrial fibrillation ([facility] ) 2018   • Encephalopathy 2018   • Hypotension 2018   • Macrocytic anemia 2018   • Hypocalcemia 2018   • Perforation of colon (HCC)    • Moderate protein-calorie malnutrition ([facility] ) 2018   • Heart failure, systolic, due to idiopathic cardiomyopathy ([facility] ) 2018   • Agitation 2018   • Alcohol abuse 05/15/2018   • Cigarette nicotine dependence without complication    • Stage 4 very severe COPD by GOLD classification ([place] [address] ) 2017   • Spinal stenosis of cervical region 2016   • Pericarditis 2016   • Coronary artery disease involving native coronary artery of native heart without angina pectoris 2016   • Crohn disease ([facility] ) 10/22/2016   • Atherosclerosis of coronary artery 2016   • Dyslipidemia 10/19/2013      LOS (days): 10  Geometric Mean LOS (GMLOS) (days):   Days to GMLOS:     OBJECTIVE:  Risk of Unplanned Readmission Score: 21 81         Current admission status: Inpatient   Preferred Pharmacy:   [facility] [address] [place] [address] [address] [place] [address] Street  [address] [place], [place] [address]  [facility]  Phone: [phone] Fax: [phone]    Primary Care Provider: Luis Felipe Arenas DO    Primary Insurance: [employer]  Secondary Insurance:     DISCHARGE DETAILS:                                          Other Referral/Resources/Interventions Provided:  Referral Comments: Yossi Barnett from [facility] here & has open case for pt  Asked cm to leave her a vm when pt goes home with vna  Yossi Barnett [phone]

## 2023-05-05 NOTE — RESPIRATORY THERAPY NOTE
Resp care   05/05/23 0800   Inhalation Therapy Tx   $ Inhalation Therapy Performed Yes   $ Pulse Oximetry Spot Check Charge Completed   SpO2 94 %   Pre-Treatment Pulse 103   Pre-Treatment Respirations 18   Duration 20   Breath Sounds Pre-Treatment Bilateral Coarse   Delivery Source Oxygen;UDN   Position Semi Alford's   Treatment Tolerance Tolerated well   Resp Comments pt on 4L nc, bs are coarse, udn tx given, will continue to monitor per resp protocol

## 2023-05-05 NOTE — ASSESSMENT & PLAN NOTE
Hemoglobin on presentation 12 1 with unclear baseline  Previous labs done in 2018 demonstrate hemoglobin close to 8  With adequate fluid resuscitation patient's hemoglobin dropped to 7-8, which is likely closer to his baseline    B12 within normal limits  Folate significantly low at 2 3  No S/S of active bleed at this time      Plan:  -HGB 7 2 -> 8 2 -> 8 7 -> 8 6   -S/P 1U PRBC on 5/3   -continue daily folate supplementation  -monitor daily CBC  -Transfuse for Hgb <7 0

## 2023-05-05 NOTE — ASSESSMENT & PLAN NOTE
Since extubation, patient has been AAOx1 only  Able to state his name, but is unable to state location, date, situation  Daughter reports he also was unable to identify her when she visited on 4/28  His baseline is AAOx4 without cognitive deficit  Etiology unclear at this time, possibly 2/2 withdrawal vs  Wernicke's vs  Metabolic abnormalities vs  anoxic injury in setting of hypotension  [facility]  Infectious workup has also been negative  vEEG negative      Plan:  -Oriented at this time   -Continue to monitor at this time with recent D/C of many CNS altering medications  -Delirium precautions  -Promote sleep/wake cycle  -Avoid metabolic derangements  -Continue thiamine and folate supplementation

## 2023-05-05 NOTE — ASSESSMENT & PLAN NOTE
Patient presented with acute hypoxia and increased work of breathing  Required continuous bipap and ultimately intubated for airway protection in the setting of treating alcohol withdrawal  Sputum culture growing candida, however, per ICU sign out likely contaminant and no need to treat  Extubated on 4/28 to nasal cannula and has been titrated down to 2L  Patient's daughter reports that patient does have O2 at home, but he never uses it  Stage IV COPD based on outpatient documentation  -5/1 AM patient in increased respiratory distress, accessory muscle use on 6L NC- back on BIPAP  -Etiology unclear - possibly 2/2 metabolic derangements in setting of alcohol ketoacidosis vs  COPD vs  Underlying infection  -Completed 5 days of Cefepime and Vancomycin     Plan:  -Weaning patient, currently on 4L NC   -Goal to wean to ~2L-room air    -Will require home O2 at discharge  Home O2 study    -Bipap at night     -CXR 5/1: Persistent retrocardiac atelectasis/mild airspace disease   -Prednisone 40 mg, S/P solumedrol    -Continue to titrate O2 to maintain SpO2 >88%  -Continue atrovent, performist, and pulmicort  -PRN albuterol

## 2023-05-05 NOTE — ASSESSMENT & PLAN NOTE
Patient maintained in the outpatient setting on albuterol and ipratropium    Plan:  -Wean O2 as tolerated, home O2 eval   -Continue Atrovent, Pulmicort, and Performist  -Prednisone taper stopped due to respiratory distress   -Now on Prednisone 40 mg daily   -Continue to titrate supplemental O2 to maintain saturation greater than 88%

## 2023-05-06 VITALS
HEART RATE: 109 BPM | DIASTOLIC BLOOD PRESSURE: 75 MMHG | OXYGEN SATURATION: 93 % | SYSTOLIC BLOOD PRESSURE: 118 MMHG | WEIGHT: 166.67 LBS | BODY MASS INDEX: 26.79 KG/M2 | RESPIRATION RATE: 16 BRPM | HEIGHT: 66 IN | TEMPERATURE: 98.9 F

## 2023-05-06 PROBLEM — E87.29 METABOLIC ACIDOSIS, INCREASED ANION GAP: Status: RESOLVED | Noted: 2018-05-15 | Resolved: 2023-05-06

## 2023-05-06 LAB
ALBUMIN SERPL BCP-MCNC: 2.2 G/DL (ref 3.5–5)
ALP SERPL-CCNC: 207 U/L (ref 46–116)
ALT SERPL W P-5'-P-CCNC: 41 U/L (ref 12–78)
ANION GAP SERPL CALCULATED.3IONS-SCNC: 2 MMOL/L (ref 4–13)
AST SERPL W P-5'-P-CCNC: 40 U/L (ref 5–45)
BASOPHILS # BLD AUTO: 0.02 THOUSANDS/ÂΜL (ref 0–0.1)
BASOPHILS NFR BLD AUTO: 0 % (ref 0–1)
BILIRUB SERPL-MCNC: 2.27 MG/DL (ref 0.2–1)
BUN SERPL-MCNC: 17 MG/DL (ref 5–25)
CALCIUM ALBUM COR SERPL-MCNC: 9.2 MG/DL (ref 8.3–10.1)
CALCIUM SERPL-MCNC: 7.8 MG/DL (ref 8.3–10.1)
CHLORIDE SERPL-SCNC: 106 MMOL/L (ref 96–108)
CO2 SERPL-SCNC: 30 MMOL/L (ref 21–32)
CREAT SERPL-MCNC: 0.82 MG/DL (ref 0.6–1.3)
EOSINOPHIL # BLD AUTO: 0.15 THOUSAND/ÂΜL (ref 0–0.61)
EOSINOPHIL NFR BLD AUTO: 1 % (ref 0–6)
ERYTHROCYTE [DISTWIDTH] IN BLOOD BY AUTOMATED COUNT: 17.9 % (ref 11.6–15.1)
GFR SERPL CREATININE-BSD FRML MDRD: 96 ML/MIN/1.73SQ M
GLUCOSE SERPL-MCNC: 128 MG/DL (ref 65–140)
GLUCOSE SERPL-MCNC: 80 MG/DL (ref 65–140)
GLUCOSE SERPL-MCNC: 84 MG/DL (ref 65–140)
HCT VFR BLD AUTO: 27.5 % (ref 36.5–49.3)
HGB BLD-MCNC: 8.6 G/DL (ref 12–17)
IMM GRANULOCYTES # BLD AUTO: 0.05 THOUSAND/UL (ref 0–0.2)
IMM GRANULOCYTES NFR BLD AUTO: 1 % (ref 0–2)
LYMPHOCYTES # BLD AUTO: 1.26 THOUSANDS/ÂΜL (ref 0.6–4.47)
LYMPHOCYTES NFR BLD AUTO: 12 % (ref 14–44)
MAGNESIUM SERPL-MCNC: 2.1 MG/DL (ref 1.6–2.6)
MCH RBC QN AUTO: 32.3 PG (ref 26.8–34.3)
MCHC RBC AUTO-ENTMCNC: 31.3 G/DL (ref 31.4–37.4)
MCV RBC AUTO: 103 FL (ref 82–98)
MONOCYTES # BLD AUTO: 0.86 THOUSAND/ÂΜL (ref 0.17–1.22)
MONOCYTES NFR BLD AUTO: 8 % (ref 4–12)
NEUTROPHILS # BLD AUTO: 8.08 THOUSANDS/ÂΜL (ref 1.85–7.62)
NEUTS SEG NFR BLD AUTO: 78 % (ref 43–75)
NRBC BLD AUTO-RTO: 0 /100 WBCS
PHOSPHATE SERPL-MCNC: 2.8 MG/DL (ref 2.3–4.1)
PLATELET # BLD AUTO: 272 THOUSANDS/UL (ref 149–390)
PMV BLD AUTO: 10 FL (ref 8.9–12.7)
POTASSIUM SERPL-SCNC: 3.9 MMOL/L (ref 3.5–5.3)
PROT SERPL-MCNC: 5.7 G/DL (ref 6.4–8.4)
RBC # BLD AUTO: 2.66 MILLION/UL (ref 3.88–5.62)
SODIUM SERPL-SCNC: 138 MMOL/L (ref 135–147)
WBC # BLD AUTO: 10.42 THOUSAND/UL (ref 4.31–10.16)

## 2023-05-06 RX ORDER — LEVOTHYROXINE SODIUM 0.05 MG/1
50 TABLET ORAL
Qty: 30 TABLET | Refills: 0 | Status: SHIPPED | OUTPATIENT
Start: 2023-05-07 | End: 2023-06-06

## 2023-05-06 RX ORDER — SULFASALAZINE 500 MG/1
500 TABLET ORAL 4 TIMES DAILY
Qty: 120 TABLET | Refills: 2 | Status: SHIPPED | OUTPATIENT
Start: 2023-05-06 | End: 2023-05-06 | Stop reason: SDUPTHER

## 2023-05-06 RX ORDER — MIDODRINE HYDROCHLORIDE 10 MG/1
10 TABLET ORAL
Qty: 270 TABLET | Refills: 0 | Status: SHIPPED | OUTPATIENT
Start: 2023-05-06 | End: 2023-08-04

## 2023-05-06 RX ORDER — TIOTROPIUM BROMIDE INHALATION SPRAY 1.56 UG/1
2 SPRAY, METERED RESPIRATORY (INHALATION) DAILY
Qty: 4 G | Refills: 0 | Status: SHIPPED | OUTPATIENT
Start: 2023-05-06 | End: 2023-05-19

## 2023-05-06 RX ORDER — ALBUTEROL SULFATE 90 UG/1
2 AEROSOL, METERED RESPIRATORY (INHALATION) EVERY 6 HOURS PRN
Qty: 18 G | Refills: 0 | Status: SHIPPED | OUTPATIENT
Start: 2023-05-06 | End: 2023-05-19 | Stop reason: SDUPTHER

## 2023-05-06 RX ORDER — TIOTROPIUM BROMIDE INHALATION SPRAY 1.56 UG/1
2 SPRAY, METERED RESPIRATORY (INHALATION) DAILY
Qty: 4 G | Refills: 0 | Status: SHIPPED | OUTPATIENT
Start: 2023-05-06 | End: 2023-05-06 | Stop reason: SDUPTHER

## 2023-05-06 RX ORDER — SULFASALAZINE 500 MG/1
500 TABLET ORAL 4 TIMES DAILY
Qty: 360 TABLET | Refills: 0 | Status: SHIPPED | OUTPATIENT
Start: 2023-05-06 | End: 2023-08-04

## 2023-05-06 RX ORDER — SULFASALAZINE 500 MG/1
500 TABLET ORAL 4 TIMES DAILY
Status: DISCONTINUED | OUTPATIENT
Start: 2023-05-06 | End: 2023-05-06 | Stop reason: HOSPADM

## 2023-05-06 RX ORDER — ALBUTEROL SULFATE 90 UG/1
2 AEROSOL, METERED RESPIRATORY (INHALATION) EVERY 6 HOURS PRN
Qty: 8.5 G | Refills: 0 | Status: SHIPPED | OUTPATIENT
Start: 2023-05-06 | End: 2023-05-06 | Stop reason: SDUPTHER

## 2023-05-06 RX ADMIN — LEVOTHYROXINE SODIUM 50 MCG: 50 TABLET ORAL at 08:47

## 2023-05-06 RX ADMIN — PREDNISONE 40 MG: 20 TABLET ORAL at 08:47

## 2023-05-06 RX ADMIN — B-COMPLEX W/ C & FOLIC ACID TAB 1 TABLET: TAB at 08:47

## 2023-05-06 RX ADMIN — SULFASALAZINE 500 MG: 500 TABLET ORAL at 09:30

## 2023-05-06 RX ADMIN — BUDESONIDE 0.5 MG: 0.5 INHALANT ORAL at 07:39

## 2023-05-06 RX ADMIN — PANTOPRAZOLE SODIUM 40 MG: 40 TABLET, DELAYED RELEASE ORAL at 06:38

## 2023-05-06 RX ADMIN — MIDODRINE HYDROCHLORIDE 10 MG: 5 TABLET ORAL at 11:01

## 2023-05-06 RX ADMIN — MIDODRINE HYDROCHLORIDE 10 MG: 5 TABLET ORAL at 06:38

## 2023-05-06 RX ADMIN — ENOXAPARIN SODIUM 40 MG: 40 INJECTION SUBCUTANEOUS at 08:46

## 2023-05-06 RX ADMIN — FOLIC ACID 1 MG: 5 INJECTION, SOLUTION INTRAMUSCULAR; INTRAVENOUS; SUBCUTANEOUS at 08:47

## 2023-05-06 RX ADMIN — LOPERAMIDE HCL 2 MG: 1 SOLUTION ORAL at 08:48

## 2023-05-06 RX ADMIN — LEVALBUTEROL HYDROCHLORIDE 1.25 MG: 1.25 SOLUTION RESPIRATORY (INHALATION) at 07:40

## 2023-05-06 RX ADMIN — THIAMINE HCL TAB 100 MG 100 MG: 100 TAB at 08:47

## 2023-05-06 RX ADMIN — Medication 1 PACKET: at 08:46

## 2023-05-06 RX ADMIN — NICOTINE 21 MG: 21 PATCH, EXTENDED RELEASE TRANSDERMAL at 08:46

## 2023-05-06 RX ADMIN — MICONAZOLE NITRATE: 20 CREAM TOPICAL at 08:51

## 2023-05-06 RX ADMIN — FORMOTEROL FUMARATE DIHYDRATE 20 MCG: 20 SOLUTION RESPIRATORY (INHALATION) at 07:39

## 2023-05-06 RX ADMIN — IPRATROPIUM BROMIDE 0.5 MG: 0.5 SOLUTION RESPIRATORY (INHALATION) at 07:40

## 2023-05-06 RX ADMIN — SULFASALAZINE 500 MG: 500 TABLET ORAL at 11:01

## 2023-05-06 NOTE — ASSESSMENT & PLAN NOTE
Since extubation, patient has been AAOx1 only  Able to state his name, but is unable to state location, date, situation  Daughter reports he also was unable to identify her when she visited on 4/28  His baseline is AAOx4 without cognitive deficit  Etiology unclear at this time, possibly 2/2 withdrawal vs  Wernicke's vs  Metabolic abnormalities vs  anoxic injury in setting of hypotension  [facility]  Infectious workup has also been negative  vEEG negative      Plan:  -Oriented at this time   -Continue to monitor at this time with recent D/C of many CNS altering medications  -Delirium precautions  -Promote sleep/wake cycle  -Avoid metabolic derangements  -Continue thiamine and folate supplementation

## 2023-05-06 NOTE — NURSING NOTE
Patient and grandson given discharge instructions both stated understanding  Patient was taken down to [address] to  the prescriptions via wheelchair with PCA   Wt Readings from Last 3 Encounters:   11/20/22 109 kg (240 lb)   11/09/22 103 kg (227 lb 3 2 oz)   11/01/22 97 4 kg (214 lb 11 7 oz)       Patient with history of chronic diastolic heart failure  Presenting with shortness of breath worsening leg swelling for the past day  Patient examined volume overloaded, crackles on exam, lower extremity edema  Will continue on IV Lasix 40 mg b i d    Home dose is 40 mg Lasix daily  Fluid restriction, sodium restriction  Monitor urine output, check daily weight

## 2023-05-06 NOTE — DISCHARGE SUMMARY
INTERNAL MEDICINE RESIDENCY DISCHARGE SUMMARY     Alejandro Unger   63 y o  male  MRN: [id number]  Room/Bed: [facility] 901/[facility] 901-01     [facility]    Encounter: [id number]    Principal Problem:    Encephalopathy  Active Problems:    Alcohol abuse    [facility]    Crohn disease ([facility] )    Coronary artery disease involving native coronary artery of native heart without angina pectoris    Cigarette nicotine dependence without complication    Macrocytic anemia    End stage COPD ([facility] )    Prediabetes    Abnormal liver function      * Encephalopathy  Assessment & Plan  Since extubation, patient has been AAOx1 only  Able to state his name, but is unable to state location, date, situation  Daughter reports he also was unable to identify her when she visited on 4/28  His baseline is AAOx4 without cognitive deficit  Etiology unclear at this time, possibly 2/2 withdrawal vs  Wernicke's vs  Metabolic abnormalities vs  anoxic injury in setting of hypotension  [address] on presentation negative  Infectious workup has also been negative  vEEG negative  Plan:  -Oriented at this time   -Continue to monitor at this time with recent D/C of many CNS altering medications  -Delirium precautions  -Promote sleep/wake cycle  -Avoid metabolic derangements  -Continue thiamine and folate supplementation     Alcohol abuse  Assessment & Plan  Patient with significant history of alcohol use  Daughter reports drinks from the time he wakes up to the time he sleeps, possibly consuming 8 glasses of apple kenji daily  Ethanol on presentation 45 with negative UDS and ehtylene glycol  Last drink on 4/25 prior to arrival to hospital  Patient managed in ICU with versed, precedex, and propofol and ultimately treated with phenobarbital (4/25: 150 mg, 4/26: 230/650/130/130, 4/27: 130/260, 4/28: 130), which has now been discontinued   With continued encephalopathy, patient underwent vEEG demonstrating mild diffuse cerebral dysfunction of nonspecific etiology  No electrographic seizures or interictal epileptiform discharges were seen      Plan:   -Thiamine and folate ordered  -Monitor for S/S of withdrawal  -S/P IV Thiamine  Acute respiratory failure with hypoxia [facility])  Assessment & Plan  Patient presented with acute hypoxia and increased work of breathing  Required continuous bipap and ultimately intubated for airway protection in the setting of treating alcohol withdrawal  Sputum culture growing candida, however, per ICU sign out likely contaminant and no need to treat  Extubated on 4/28 to nasal cannula and has been titrated down to 2L  Patient's daughter reports that patient does have O2 at home, but he never uses it  Stage IV COPD based on outpatient documentation  -5/1 AM patient in increased respiratory distress, accessory muscle use on 6L NC- back on BIPAP  -Etiology unclear - possibly 2/2 metabolic derangements in setting of alcohol ketoacidosis vs  COPD vs  Underlying infection  -Completed 5 days of Cefepime and Vancomycin     Plan:  -Weaning patient, currently on 4L NC   -Will require home O2 at discharge  -Bipap at night    -Discontinue Prednisone  -Continue to titrate O2 to maintain SpO2 >88%  -Started on Spiriva at discharge  -PRN albuterol    Abnormal liver function  Assessment & Plan  Patient presented with , ALT 66, alk phos 263, and T  bili 1 10 with unclear baseline as patient without recent CMP to evaluate  CT of the chest abdomen pelvis completed on 4/25 significant for hepatomegaly and severe hepatic steatosis  Chronic hepatitis panel completed was negative  Right upper quadrant ultrasound done on 4/27 demonstrated hepatomegaly with severe hepatic steatosis without concern for cholecystitis or signs of cirrhosis  Plan:  -T  bili 2 09 -> 2 97 -> 2 83 today  -Given lack of abdominal pain hold off imaging at this time, trend labs    -Possibly in setting of ongoing drinking with appropriate ratio  -Continue to monitor daily CMP/INR    Prediabetes  Assessment & Plan  A1c 6 2 on presentation  Plan:   -Will need outpatient evaluation by PCP    End stage COPD [facility])  Assessment & Plan  Patient maintained in the outpatient setting on albuterol and ipratropium    Plan:  -Wean O2 as tolerated, home O2 eval   -Started on Spiriva at discharge  Continue albuterol PRN  -Discontinue Prednisone 40 mg daily   -Continue to titrate supplemental O2 to maintain saturation greater than 88%    Macrocytic anemia  Assessment & Plan  Hemoglobin on presentation 12 1 with unclear baseline  Previous labs done in 2018 demonstrate hemoglobin close to 8  With adequate fluid resuscitation patient's hemoglobin dropped to 7-8, which is likely closer to his baseline    B12 within normal limits  Folate significantly low at 2 3  No S/S of active bleed at this time  Plan:  -HGB 7 2 -> 8 2 -> 8 7 -> 8 6   -S/P 1U PRBC on 5/3   -continue daily folate supplementation  -monitor daily CBC  -Transfuse for Hgb <7 0    Cigarette nicotine dependence without complication  Assessment & Plan  Plan:  -Continue nicotine patch    Coronary artery disease involving native coronary artery of native heart without angina pectoris  Assessment & Plan  -Continue aspirin 81mg and atorvastatin 40mg daily    Crohn disease ([facility] )  Assessment & Plan  Patient with history of Crohn disease  complicated by transverse colon perforation requiring resection and ileostomy with reversal in 2018    Currently prescribed sulfasalazine in the outpatient setting, however, daughter reports patient not taking his medications at this time    Plan:  -Started Sulfasalazine at discharge  -Will need outpatient GI versus colorectal surgery follow-up    Metabolic acidosis, increased anion gap-resolved as of 5/6/2023  Assessment & Plan  -Prior in admission, likely secondary to lactic acidosis  -Today Co2 31 -> 19, Anion Gap 4 -> "15     Plan:  -Recheck BMP   -VBG  -Follow up closely  Vitals:    05/05/23 2227 05/06/23 0600 05/06/23 0728 05/06/23 0740   BP: (!) 133/109  118/75    BP Location:   Left arm    Pulse: 96  94 (!) 109   Resp: 15  16    Temp: 98 3 °F (36 8 °C)  98 9 °F (37 2 °C)    TempSrc:   Oral    SpO2: 97%  93% 93%   Weight:  75 6 kg (166 lb 10 7 oz)     Height:         Physical Exam  Constitutional:       Appearance: Normal appearance  HENT:      Mouth/Throat:      Mouth: Mucous membranes are moist    Eyes:      Conjunctiva/sclera: Conjunctivae normal    Cardiovascular:      Rate and Rhythm: Normal rate and regular rhythm  Pulses: Normal pulses  Heart sounds: Normal heart sounds  Pulmonary:      Effort: Pulmonary effort is normal       Breath sounds: Normal breath sounds  Comments: On 4L NC  Abdominal:      General: There is no distension  Palpations: Abdomen is soft  Musculoskeletal:         General: Normal range of motion  Cervical back: Normal range of motion  Skin:     General: Skin is warm  Neurological:      General: No focal deficit present  Mental Status: He is alert and oriented to person, place, and time  Psychiatric:         Mood and Affect: Mood normal          [address]     Per my transfer note dated 4/29:  \"Per Dr Mejia Watts H&P and transfer note:  Ann Marie Pacheco a 63 y  o  male who presents with shortness of breath for a couple of days that worsened this morning  PMH stage 4 COPD, alcohol abuse, tobacco abuse, Crohn's disease s/p ileostomy and reversal in 2018  Patient states the only medications that he takes are albuterol and ipratropium  He endorses drinking several glasses of kenji daily, last drink was last night  Denies history of seizures  ED workup demonstrates lactic acidosis of 20 3, leukocytosis WBC 20 57, ammonia 82  14 [facility] 4/25 negative   CT CAP 4/25 showed circumferential mural thickening of esophagus with air-fluid level and hepatomegaly " "with severe hepatic steatosis       He was hypotensive, tachycardic, and tachypneic with an elevated lactate of 20      Patient required pressor support and BiPAP, and was transferred to ICU for further management  Suspect he had alcoholic ketoacidosis  Ultimately received phenobarbital ~1 8g and required intubation on 4/26 for airway protection  Extubated 4/28  Was on vEEG for 24 hours 4/27-28, no seizures  He's been off pressors and hemodynamically stable  Stable for transfer to [facility]  \"    Throughout hospitalization, patient was treated for septic shock (unclear source) vs  Hypovolemic shock as well as alcoholic ketoacidosis and alcohol withdrawal  Completed 5 day treatment with broad spectrum antibiotics (vanc/cefepime)  He was treated with Prednisone for possible component of COPD exacerbation  It was established that with patient's significant COPD, he would need long term O2  Portable O2 obtained by [facility], and patient already with home O2  PT/OT evaluated and recommended post acute rehab, however, patient and family refused and preferred D/C to home with home healthcare that was arranged by [facility]  Patient seen and evaluated at bedside today  He reports doing well with no complaints  Patient had a rectal tube placed overnight, removed this morning  Today, patient denies abdominal pain, chest pain, shortness of breath, or any other symptoms  He was started on sulfasalazine for his Crohn's disease  Encouraged to follow-up with GI  Medication regimen was discussed with patient and daughter on the phone  Medications were sent to [address] [place] [address]  Portable oxygen delivered to patient room prior to discharge  Discussed with patient that he can take 2 tablets of midodrine 5 mg that he has at home and then start with a 10 mg tablet  Patient can continue home inhalers as new inhalers sent for price check were too expensive  Advised patient to follow-up with GI, pulmonology, and PCP      DISCHARGE INFORMATION " PCP at Discharge: patient will establish    Admitting Provider: Olive Salomon MD  Admission Date: 4/25/2023    Discharge Provider: No att  providers found  Discharge Date: 5/6/2023    Discharge Disposition: Home with [address] [facility]  Discharge Condition: fair  Discharge with Lines: no    Discharge Diet: cardiac diet  Activity Restrictions: none  Test Results Pending at Discharge: none    Discharge Diagnoses:  Principal Problem:    Encephalopathy  Active Problems:    Alcohol abuse    Acute respiratory failure with hypoxia (HCC)    Crohn disease ([facility] )    Coronary artery disease involving native coronary artery of native heart without angina pectoris    Cigarette nicotine dependence without complication    Macrocytic anemia    [facility] )    Prediabetes    Abnormal liver function  Resolved Problems:    Lactic acidosis    Metabolic acidosis, increased anion gap    Hyponatremia    DEBBY (acute kidney injury) ([facility] 75 )    Shock ([facility] )    Hyperammonemia ([facility] )      Consulting Providers:  Toxicology, Palliative    Diagnostic & Therapeutic Procedures Performed:  XR chest portable    Result Date: 4/30/2023  Impression: Mild central vascular prominence and increase interstitial and patchy densities in the right lower lung field, as seen previously  Left pleural thickening/trace effusion  Left basilar retrocardiac atelectasis/small infiltrate  Workstation performed: [id number]     XR abdomen 1 view kub    Result Date: 4/26/2023  Impression: Nasogastric tube tip terminates at the mid stomach  Possible mild small bowel distention in the left mid abdomen, nonspecific  This can be reassessed on follow-up  Workstation performed: [id number]     XR abdomen 1 view kub    Result Date: 4/26/2023  Impression: Nasogastric tube has been inserted, tip seen passing towards the stomach, distal end not fully seen   Workstation performed: [id number]     CT head without contrast    Result Date: 4/25/2023  Impression: No acute intracranial abnormality  Workstation performed: [id number]     CTA dissection protocol chest abdomen pelvis w wo contrast    Result Date: 4/25/2023  Impression: 1  No aortic dissection, intramural hematoma, or aortic aneurysm  Patent mesenteric vessels  2  Circumferential mural thickening of the esophagus with an air-fluid level, which may relate to esophagitis and esophageal dysmotility  3  Hepatomegaly with severe hepatic steatosis  I personally discussed this study with Dr Claudia Blum on 4/25/2023 1:15 PM at 4/25/2023 1:36 PM  Workstation performed: [id number]     XR chest portable ICU    Result Date: 4/30/2023  Impression: Right lower lobe patchy opacity concerning for possible infiltrate  Workstation performed: [id number]     XR chest portable ICU    Result Date: 4/28/2023  Impression: No acute cardiopulmonary disease  Lines and tubes well-positioned with no pneumothorax  Workstation performed: [id number]     XR chest portable ICU    Result Date: 4/26/2023  Impression: No active pulmonary disease  Workstation performed: [id number]     XR chest portable ICU    Result Date: 4/26/2023  Impression: Right IJ line tip has been placed, tip overlies the SVC  No active pulmonary disease  Workstation performed: [id number]     US right upper quadrant    Result Date: 4/27/2023  Impression: 1  Hepatomegaly with severe hepatic steatosis suggested  2   Visualized portions of the pancreas are normal  3   Minimal cholelithiasis   Workstation performed: [id number]       Code Status: Prior  Advance Directive & Living Will: <no information>  Power of :    POLST:      Medications:  Discharge Medication List as of 5/6/2023 12:14 PM      START taking these medications    Details   levothyroxine 50 mcg tablet Take 1 tablet (50 mcg total) by mouth daily in the early morning Do not start before May 7, 2023 , Starting Sun 5/7/2023, Until Tue 6/6/2023, Normal      midodrine (PROAMATINE) 10 MG tablet Take 1 tablet (10 mg total) by mouth 3 (three) times a day before meals, Starting Sat 5/6/2023, Until Fri 8/4/2023, Normal           Discharge Medication List as of 5/6/2023 12:14 PM      STOP taking these medications       albuterol (2 5 mg/3 mL) 0 083 % nebulizer solution Comments:   Reason for Stopping:         albuterol (2 5 mg/3 mL) 0 083 % nebulizer solution Comments:   Reason for Stopping:         Ascorbic Acid (vitamin C) 100 MG tablet Comments:   Reason for Stopping:         fluticasone-umeclidinium-vilanterol (Trelegy Ellipta) 100-62 5-25 MCG/INH inhaler Comments:   Reason for Stopping:         folic acid (FOLVITE) 1 mg tablet Comments:   Reason for Stopping:         ipratropium (ATROVENT) 0 02 % nebulizer solution Comments:   Reason for Stopping:             Discharge Medication List as of 5/6/2023 12:14 PM      CONTINUE these medications which have NOT CHANGED    Details   !! albuterol (PROVENTIL HFA,VENTOLIN HFA) 90 mcg/act inhaler INHALE 2 PUFFS BY MOUTH EVERY 6 HOURS AS NEEDED FOR WHEEZING, Normal      aspirin 81 mg chewable tablet Chew 1 tablet (81 mg total) daily, Starting u 7/23/2020, Normal      atorvastatin (LIPITOR) 40 mg tablet TAKE 1 TABLET BY MOUTH DAILY WITH DINNER, Normal      gabapentin (NEURONTIN) 300 mg capsule TAKE 1 CAPSULE BY MOUTH THREE TIMES A DAY, Normal      Multiple Vitamins-Minerals (Centrum Silver 50+Men) TABS Take by mouth, Historical Med      Nebulizers (AERONEB GO NEBULIZER HANDSET) MISC by Does not apply route 2 (two) times a day as needed (wheezing), Starting Fri 6/8/2018, Normal       !! - Potential duplicate medications found  Please discuss with provider          Discharge Medication List as of 5/6/2023 12:14 PM      CONTINUE these medications which have CHANGED    Details   !! albuterol (ProAir HFA) 90 mcg/act inhaler Inhale 2 puffs every 6 (six) hours as needed for wheezing, Starting Sat 5/6/2023, Normal      sulfaSALAzine (AZULFIDINE) 500 mg tablet Take 1 tablet (500 mg total) by mouth "4 (four) times a day, Starting Sat 5/6/2023, Until Fri 8/4/2023, Normal      tiotropium (Spiriva Respimat) 1 25 MCG/ACT AERS inhaler Inhale 2 puffs daily, Starting Sat 5/6/2023, Normal       !! - Potential duplicate medications found  Please discuss with provider  Allergies: Allergies   Allergen Reactions   • Medical Tape    • Other      Brown cloth band aids      • Latex Rash       FOLLOW-UP     PCP Outpatient Follow-up: Will set outpatient follow up with his PCP    Consulting Providers Follow-up:  Pulmonology    Active Issues Requiring Follow-up:   Yes, respiratory failure, hypotension and Chrons disease    Discharge Statement:   I spent 45 minutes minutes discharging the patient  This time was spent on the day of discharge  I had direct contact with the patient on the day of discharge  Additional documentation is required if more than 30 minutes were spent on discharge  Portions of the record may have been created with voice recognition software  Occasional wrong word or \"sound a like\" substitutions may have occurred due to the inherent limitations of voice recognition software    Read the chart carefully and recognize, using context, where substitutions have occurred     ==  Mariah Villanueva, [address] [facility]  Internal Medicine Resident PGY-3      "

## 2023-05-06 NOTE — ASSESSMENT & PLAN NOTE
Patient presented with acute hypoxia and increased work of breathing  Required continuous bipap and ultimately intubated for airway protection in the setting of treating alcohol withdrawal  Sputum culture growing candida, however, per ICU sign out likely contaminant and no need to treat  Extubated on 4/28 to nasal cannula and has been titrated down to 2L  Patient's daughter reports that patient does have O2 at home, but he never uses it  Stage IV COPD based on outpatient documentation  -5/1 AM patient in increased respiratory distress, accessory muscle use on 6L NC- back on BIPAP  -Etiology unclear - possibly 2/2 metabolic derangements in setting of alcohol ketoacidosis vs  COPD vs  Underlying infection  -Completed 5 days of Cefepime and Vancomycin     Plan:  -Weaning patient, currently on 4L NC   -Will require home O2 at discharge    -Bipap at night    -Discontinue Prednisone  -Continue to titrate O2 to maintain SpO2 >88%  -Started on Spiriva at discharge  -PRN albuterol

## 2023-05-06 NOTE — DISCHARGE INSTR - AVS FIRST PAGE
Increase midodrine to 10 mg 3 times a day  Continue home inhalers, including albuterol as needed and Trelegy  Follow-up with pulmonology  Start sulfasalazine 500 mg 4 times a day  Follow-up with gastroenterology  We will set up an appointment with our clinic to see you PCP within 7 to 14 days  Our office will call you to schedule the appointment

## 2023-05-06 NOTE — PROGRESS NOTES
Patient ambulated 120 feet on 3L of oxygen stating at 96%  Patient tolerated well  Slight SOB noted on exertion

## 2023-05-06 NOTE — ASSESSMENT & PLAN NOTE
Patient maintained in the outpatient setting on albuterol and ipratropium    Plan:  -Wean O2 as tolerated, home O2 eval   -Started on Spiriva at discharge  Continue albuterol PRN    -Discontinue Prednisone 40 mg daily   -Continue to titrate supplemental O2 to maintain saturation greater than 88%

## 2023-05-06 NOTE — CASE MANAGEMENT
Case Management Discharge Planning Note    Patient name Jayce   Location [facility] 901/[facility] 901-01 MRN [id number]  : 1960 Date 2023       Current Admission Date: 2023  Current Admission Diagnosis:Encephalopathy   Patient Active Problem List    Diagnosis Date Noted   • Acute respiratory failure with hypoxia ([facility] ) 2023   • End stage COPD ([facility] ) 2023   • Prediabetes 2023   • Abnormal liver function 2023   • Screening for diabetes mellitus 2019   • Status post reversal of ileostomy 10/25/2018   • Hypercalcemia 2018   • Hematemesis with nausea 2018   • Presence of drug-eluting stent in right coronary artery 2018   • Paroxysmal atrial fibrillation ([facility] ) 2018   • Encephalopathy 2018   • Hypotension 2018   • Macrocytic anemia 2018   • Hypocalcemia 2018   • Perforation of colon (HCC)    • Moderate protein-calorie malnutrition ([facility] ) 2018   • Heart failure, systolic, due to idiopathic cardiomyopathy ([place] [address] ) 2018   • Agitation 2018   • Alcohol abuse 05/15/2018   • Cigarette nicotine dependence without complication    • Stage 4 very severe COPD by GOLD classification ([place] [address] ) 2017   • Spinal stenosis of cervical region 2016   • Pericarditis 2016   • Coronary artery disease involving native coronary artery of native heart without angina pectoris 2016   • Crohn disease ([facility] ) 10/22/2016   • Atherosclerosis of coronary artery 2016   • Dyslipidemia 10/19/2013      LOS (days): 11  Geometric Mean LOS (GMLOS) (days):   Days to GMLOS:     OBJECTIVE:  Risk of Unplanned Readmission Score: 22 11         Current admission status: Inpatient   Preferred Pharmacy:   [address], [address] [facility] [address]  Phone: [phone] Fax: [address] [facility] [address] David Kwon  Phone: [phone] Fax: [phone]    Primary Care Provider: Namita Borrego DO    Primary Insurance: [facility]  Secondary Insurance:     DISCHARGE DETAILS:    Pt needs oxygent tank to DC with  CM confirmed with Veverlisa Cabrera (daughter) that pt has oxygen setup an d usually uses 3-4 liters (as needed), equipment is provided by [employer]  CM delivered oxygen tank to pt room, ticket signed

## 2023-05-06 NOTE — ASSESSMENT & PLAN NOTE
Patient with history of Crohn disease  complicated by transverse colon perforation requiring resection and ileostomy with reversal in 2018    Currently prescribed sulfasalazine in the outpatient setting, however, daughter reports patient not taking his medications at this time    Plan:  -Started Sulfasalazine at discharge  -Will need outpatient GI versus colorectal surgery follow-up

## 2023-05-08 ENCOUNTER — TRANSITIONAL CARE MANAGEMENT (OUTPATIENT)
Dept: INTERNAL MEDICINE CLINIC | Facility: CLINIC | Age: 63
End: 2023-05-08

## 2023-05-08 ENCOUNTER — TELEPHONE (OUTPATIENT)
Dept: INTERNAL MEDICINE CLINIC | Facility: CLINIC | Age: 63
End: 2023-05-08

## 2023-05-08 LAB — GLUCOSE SERPL-MCNC: 161 MG/DL (ref 65–140)

## 2023-05-08 NOTE — UTILIZATION REVIEW
NOTIFICATION OF ADMISSION DISCHARGE   This is a Notification of Discharge from [facility]  Please be advised that this patient has been discharge from our facility  Below you will find the admission and discharge date and time including the patient’s disposition  UTILIZATION REVIEW CONTACT:  [facility]  Utilization   Network Utilization Review Department  Phone: [phone] x carefully listen to the prompts  All voicemails are confidential   Email: [email or web address]     ADMISSION INFORMATION  PRESENTATION DATE: 4/25/2023 10:19 AM  OBERVATION ADMISSION DATE:   INPATIENT ADMISSION DATE: 4/25/23  2:11 PM   DISCHARGE DATE: 5/6/2023 12:45 PM   DISPOSITION:Home with Home Health Care    IMPORTANT INFORMATION:  Send all requests for admission clinical reviews, approved or denied determinations and any other requests to dedicated fax number below belonging to the campus where the patient is receiving treatment   List of dedicated fax numbers:  [address] DENIALS (Administrative/Medical Necessity) [phone]   [address] (Maternity/NICU/Pediatrics) [phone]   [facility] [phone]   [address] [phone]   Isreal Sheets 134 [phone]   220 [facility] [phone]   [address] [place] [address] [phone]   [address] [place] [address] 119 [phone]   [facility]  [phone]   4053 [facility] [phone]   412 [facility] 850 [address] [facility] [phone]

## 2023-05-08 NOTE — TELEPHONE ENCOUNTER
Patient returned clinical call. Clinical could not speak with patient at the time Theodora Shahid will give him a call right back.

## 2023-05-09 NOTE — UTILIZATION REVIEW
Continued Stay Review    Date: 5/4-5/6/23                      Patient Class: Inpatient   Level of Care: 5/4 Level 2 Stepdown, 5/5 Med Surg    HPI:62 y o  male initially admitted to ICU on 4/25/23 5/4 Internal Medicine: No acute events overnight  Oriented this AM, continues to have non-productive cough  Currently on 4L NC, continue to titrate supplemental O2  S/P one unit PRBC on 5/3  Hemoglobin 7 2>8 2>8 7  T bili 2 97 today  Continue folate and thiamine  Monitor CBC, CMP daily  PE: AOx3, slight wheeze in right upper lobe  No abdominal tenderness  Physical Therapy discharge recommendation is post acute rehab, pending continued progress  Occupational Therapy discharge recommendation is home with home health rehab, pending progress and cognition  5/5 Internal Medicine: Patient at baseline requires oxygen, unclear what his previous oxygen requirement was because patient does not know  Patient also has baseline labored breathing per his daughter  Patient admits to shortness of breath with minimal exertion  Home O2 eval  Speech: Attempted to see patient for f/u dysphagia therapy x2 but patient refused  He participated in VBS yesterday and did not present with any aspiration events across trials  Diet changed to regular  RN reports patient tolerated am pills well with water  No overt s/s aspiration observed  5/6 Nursing:  Patient ambulated 120 feet on 3L of oxygen at 96%  Tolerated well, slight SOB noted on exertion  Case Management: Pt needs oxygen tank to DC with  CM confirmed with Glenis Leon (daughter) that pt has oxygen setup and usually uses 3-4 liters (as needed), equipment is provided by [employer]  CM delivered oxygen tank to pt room         5/6 1245 Discharged to home with home health care           Vital Signs:     Date/Time Temp Pulse Resp BP MAP (mmHg) SpO2 Calculated FIO2 (%) - Nasal Cannula Nasal Cannula O2 Flow Rate (L/min) O2 Device   05/06/23 0740 -- 109  -- -- -- 93 % 36 4 L/min Nasal cannula   05/06/23 07:28:54 98 9 °F (37 2 °C) 94 16 118/75 89 93 % 32 3 L/min Nasal cannula   05/05/23 22:27:27 98 3 °F (36 8 °C) 96 15 133/109 Abnormal  117 97 % -- -- --   05/05/23 2000 -- -- -- -- -- -- 40 5 L/min    05/05/23 1920 -- -- -- -- -- 98 % -- -- --   05/05/23 15:06:20 97 2 °F (36 2 °C) Abnormal  102 14 102/61 75 94 % -- -- --   05/05/23 1442 -- -- -- -- -- -- 36 4 L/min Nasal cannula   05/05/23 1025 -- 80 -- -- -- 73 % Abnormal  -- -- None (Room air)   05/05/23 0800 -- -- -- -- -- 94 % 36 4 L/min Nasal cannula   05/05/23 07:18:02 98 6 °F (37 °C) 101 -- 94/67 76 93 % -- -- --   05/05/23 0300 -- -- -- -- -- -- 40 5 L/min Nasal cannula   05/05/23 02:53:57 99 5 °F (37 5 °C) 99 -- 98/65 76 96 % -- -- --   05/04/23 22:42:06 98 5 °F (36 9 °C) 96 20 123/63 83 94 % -- -- --   05/04/23 2056 -- -- -- -- -- -- 40 5 L/min Nasal cannula   05/04/23 2047 -- -- -- -- -- -- 36 4 L/min Nasal cannula   05/04/23 20:43:05 97 9 °F (36 6 °C) 103 -- 110/59 76 90 % -- -- --   05/04/23 19:08:48 97 9 °F (36 6 °C) 94 20 101/63 76 85 % Abnormal  -- -- --   05/04/23 1600 -- -- -- -- -- -- 32 3 L/min Nasal cannula   05/04/23 15:52:53 97 9 °F (36 6 °C) 110  20 94/62 73 92 % -- -- --   05/04/23 1316 -- -- -- -- -- 93 % 36 4 L/min Nasal cannula   05/04/23 0753 98 6 °F (37 °C) 88 -- 103/61 75 90 % -- -- --   05/04/23 0717 -- -- -- -- -- 94 % 36 4 L/min Nasal cannula   05/04/23 0318 -- -- -- -- -- 95 % 40 5 L/min Nasal cannula   05/04/23 0300 -- -- -- -- -- 92 % 40 5 L/min Nasal cannula   05/04/23 02:30:46 97 8 °F (36 6 °C) 82 -- 108/62 77 92 % 40 5 L/min --     Date and Time Eye Opening Best Verbal Response Best Motor Response Dina Coma Scale Score   05/05/23 2000 4 5 6 15   05/05/23 0300 4 5 6 15   05/04/23 2047 4 5 6 15   05/04/23 1600 4 5 6 15   05/04/23 0730 4 4 6 14   05/04/23 0300 4 4 6 14       Pertinent Labs/Diagnostic Results:         Results from last 7 days   Lab Units 05/06/23  0512 05/05/23  0451 05/04/23  0453 05/03/23  1631 05/03/23  0928   WBC Thousand/uL 10 42* 12 72* 11 92*  --   --    HEMOGLOBIN g/dL 8 6* 8 6* 8 7* 8 2* 7 2*   HEMATOCRIT % 27 5* 27 2* 28 5* 25 9* 22 9*   PLATELETS Thousands/uL 272 253 225  --   --    NEUTROS ABS Thousands/µL 8 08* 10 68* 9 20*  --   --        Product Unit Status Volume Start End        Transfuse Leukoreduced RBC      23  [id number]  [id number] Completed  350 mL 05/03/23 1206 05/03/23 1505         Results from last 7 days   Lab Units 05/06/23  0512 05/05/23  1325 05/05/23  0451 05/04/23  0453 05/03/23  0928 05/03/23  0438   SODIUM mmol/L 138 138 140 140 137 138   POTASSIUM mmol/L 3 9 4 8 4 2 3 8 3 8 3 6   CHLORIDE mmol/L 106 106 106 105 104 104   CO2 mmol/L 30 28 19* 31 30 30   ANION GAP mmol/L 2* 4 15* 4 3* 4   BUN mg/dL 17 19 19 14 12 12   CREATININE mg/dL 0 82 0 95 0 81 0 87 0 87 0 84   EGFR ml/min/1 73sq m 96 86 96 93 93 95   CALCIUM mg/dL 7 8* 7 9* 7 0* 7 7* 7 8* 7 8*   CALCIUM, IONIZED mmol/L  --   --   --   --   --  0 96*   MAGNESIUM mg/dL 2 1  --  2 2 2 7* 1 9 1 9   PHOSPHORUS mg/dL 2 8  --  2 4 2 0*  --  2 5     Results from last 7 days   Lab Units 05/06/23  0512 05/05/23  0451 05/04/23  0453 05/03/23  0438   AST U/L 40 48* 56* 60*   ALT U/L 41 46 49 52   ALK PHOS U/L 207* 216* 217* 222*   TOTAL PROTEIN g/dL 5 7* 5 6* 5 6* 5 0*   ALBUMIN g/dL 2 2* 2 0* 2 5* 2 5*   TOTAL BILIRUBIN mg/dL 2 27* 2 83* 2 97* 2 09*     Results from last 7 days   Lab Units 05/06/23  1246 05/06/23  1130 05/06/23  0728 05/05/23  2100 05/05/23  1615 05/05/23  1103 05/05/23  0830 05/04/23  2043 05/04/23  1642 05/04/23  1142 05/04/23  0003 05/03/23 2052   POC GLUCOSE mg/dl 128 161* 80 104 142* 130 86 69 161* 186* 86 98     Results from last 7 days   Lab Units 05/06/23  0512 05/05/23  1325 05/05/23  0451 05/04/23  0453 05/03/23  0928 05/03/23  0438   GLUCOSE RANDOM mg/dL 84 140 105 98 114 98         Results from last 7 days   Lab Units 05/05/23  1325   PH SANAZ  7 464*   PCO2 SANAZ mm Hg 40 2*   PO2 SANAZ mm Hg 90 2*   HCO3 SANAZ mmol/L 28 2   BASE EXC SANAZ mmol/L 4 1   O2 CONTENT SANAZ ml/dL 12 8   O2 HGB, VENOUS % 94 5*             Results from last 7 days   Lab Units 05/05/23  0555   UNIT PRODUCT CODE  [id number]   UNIT NUMBER  [id number]   UNITABO  A   UNITRH  POS   CROSSMATCH  Compatible   UNIT DISPENSE STATUS  Presumed Trans   UNIT PRODUCT VOL mL 350                 Medications:   Scheduled Medications:    Current Facility-Administered Medications   Medication Dose Route Frequency   • acetaminophen (TYLENOL) tablet 650 mg  650 mg Oral Q8H PRN   • albuterol inhalation solution 2 5 mg  2 5 mg Nebulization Q4H PRN   • budesonide (PULMICORT) inhalation solution 0 5 mg  0 5 mg Nebulization Q12H   • enoxaparin (LOVENOX) subcutaneous injection 40 mg  40 mg Subcutaneous Daily   • folic acid 1 mg in sodium chloride 0 9 % 50 mL IVPB  1 mg Intravenous Daily   • formoterol (PERFOROMIST) nebulizer solution 20 mcg  20 mcg Nebulization Q12H   • guaiFENesin (ROBITUSSIN) oral liquid 200 mg  200 mg Oral Q4H PRN   • insulin lispro (HumaLOG) 100 units/mL subcutaneous injection 2-12 Units  2-12 Units Subcutaneous 4x Daily (AC & HS)   • ipratropium (ATROVENT) 0 02 % inhalation solution 0 5 mg  0 5 mg Nebulization TID   • levalbuterol (XOPENEX) inhalation solution 1 25 mg  1 25 mg Nebulization TID   • levothyroxine tablet 50 mcg  50 mcg Oral Early Morning   • loperamide (IMODIUM) oral liquid 2 mg  2 mg Oral BID   • midodrine (PROAMATINE) tablet 10 mg  10 mg Oral TID AC   • moisture barrier miconazole 2% cream (aka ANSHU MOISTURE BARRIER ANTIFUNGAL CREAM)   Topical BID   • multivitamin stress formula tablet 1 tablet  1 tablet Oral Daily   • nicotine (NICODERM CQ) 21 mg/24 hr TD 24 hr patch 21 mg  21 mg Transdermal Daily   • pantoprazole (PROTONIX) EC tablet 40 mg  40 mg Oral Daily Before Breakfast   • predniSONE tablet 40 mg  40 mg Oral Daily   • psyllium (METAMUCIL) 1 packet  1 packet Oral Daily   • thiamine tablet 100 mg  100 mg Oral Daily                 Network Utilization Review Department  ATTENTION: Please call with any questions or concerns to [phone] and carefully listen to the prompts so that you are directed to the right person  All voicemails are confidential   Antonia Hunt all requests for admission clinical reviews, approved or denied determinations and any other requests to dedicated fax number below belonging to the campus where the patient is receiving treatment   List of dedicated fax numbers for the Facilities:  [address] DENIALS (Administrative/Medical Necessity) [phone]   [address] (Maternity/NICU/Pediatrics) [phone]   914 Lucille Espinoza [phone]   [facility] [address] [phone]   [address] [address] Sarah Greene 28 [phone]   1552 [facility] 134 815 [facility] [phone]

## 2023-05-15 ENCOUNTER — TELEPHONE (OUTPATIENT)
Dept: PULMONOLOGY | Facility: CLINIC | Age: 63
End: 2023-05-15

## 2023-05-15 DIAGNOSIS — J44.9 STAGE 4 VERY SEVERE COPD BY GOLD CLASSIFICATION (HCC): Primary | ICD-10-CM

## 2023-05-15 RX ORDER — ALBUTEROL SULFATE 2.5 MG/3ML
2.5 SOLUTION RESPIRATORY (INHALATION) EVERY 6 HOURS PRN
Qty: 360 ML | Refills: 3 | Status: SHIPPED | OUTPATIENT
Start: 2023-05-15

## 2023-05-15 NOTE — TELEPHONE ENCOUNTER
Patient is calling, he was in the hospital and was sent home with one oxygen tank even though they told him he needed 2 tanks  He called [facility] to try and have them bring him more tanks but said they did not have any order on file for him  He is asking if the Doctor could please place an order for oxygen tanks to be delivered  He does have one tank at home that can be returned and he was discharged on 4L of oxygen  He said his current home oxygen concentrator is not working properly  He said the machine is from his late wife who has passed away  The machine when he turns it on makes a ticking sound and doesn't give out air properly  He isn't sure how to go forward in getting this resolved  Patient is also stating when he was in the hospital someone deleted his albuterol nebulizer solution from his med list and he needs a new script sent in to [facility]  I tried scheduling patient for his follow up appointment with Dr Jones but he denied as he currently cannot walk and is not in a condition to be coming into an office      Please advise

## 2023-05-16 NOTE — TELEPHONE ENCOUNTER
Patient called asking for an update on the oxygen  He said that he would be willing to do a virtual visit since he cant make it in the office he just needs someone to explain to him how to do it  He would like an appointment asap  Please advise

## 2023-05-18 ENCOUNTER — TELEPHONE (OUTPATIENT)
Dept: PULMONOLOGY | Facility: CLINIC | Age: 63
End: 2023-05-18

## 2023-05-18 NOTE — TELEPHONE ENCOUNTER
LM for patient to give a call back I set up a Appt for him virtual  with   Dr Jethro Vidal if he should call back its already set up

## 2023-05-19 ENCOUNTER — TELEPHONE (OUTPATIENT)
Dept: PULMONOLOGY | Facility: CLINIC | Age: 63
End: 2023-05-19

## 2023-05-19 ENCOUNTER — TELEMEDICINE (OUTPATIENT)
Dept: PULMONOLOGY | Facility: CLINIC | Age: 63
End: 2023-05-19

## 2023-05-19 VITALS — BODY MASS INDEX: 26.9 KG/M2 | HEIGHT: 66 IN

## 2023-05-19 DIAGNOSIS — J96.11 CHRONIC RESPIRATORY FAILURE WITH HYPOXIA (HCC): ICD-10-CM

## 2023-05-19 DIAGNOSIS — R91.8 MULTIPLE PULMONARY NODULES: ICD-10-CM

## 2023-05-19 DIAGNOSIS — D53.9 MACROCYTIC ANEMIA: ICD-10-CM

## 2023-05-19 DIAGNOSIS — F10.10 ALCOHOL ABUSE: ICD-10-CM

## 2023-05-19 DIAGNOSIS — E72.20 HYPERAMMONEMIA (HCC): ICD-10-CM

## 2023-05-19 DIAGNOSIS — F17.210 CIGARETTE NICOTINE DEPENDENCE WITHOUT COMPLICATION: ICD-10-CM

## 2023-05-19 DIAGNOSIS — J44.9 STAGE 4 VERY SEVERE COPD BY GOLD CLASSIFICATION (HCC): Primary | ICD-10-CM

## 2023-05-19 RX ORDER — ALBUTEROL SULFATE 90 UG/1
2 AEROSOL, METERED RESPIRATORY (INHALATION) EVERY 6 HOURS PRN
Qty: 18 G | Refills: 0 | Status: SHIPPED | OUTPATIENT
Start: 2023-05-19

## 2023-05-19 RX ORDER — BUDESONIDE, GLYCOPYRROLATE, AND FORMOTEROL FUMARATE 160; 9; 4.8 UG/1; UG/1; UG/1
2 AEROSOL, METERED RESPIRATORY (INHALATION) 2 TIMES DAILY
Qty: 10.7 G | Refills: 3 | Status: SHIPPED | OUTPATIENT
Start: 2023-05-19

## 2023-05-19 NOTE — TELEPHONE ENCOUNTER
Order for O2 w/ Portability was placed through [facility] [employer] [place]

## 2023-05-19 NOTE — ASSESSMENT & PLAN NOTE
Recent hospitalization showed pt requires 4L oxygen with ambulation  - Rx oxygen with portablity evaluation

## 2023-05-19 NOTE — ASSESSMENT & PLAN NOTE
Recent exacerbation during hospitalizaiton  - Given severity of disease and now pt has insurance coverage, D/C Spiriva  Start Breztri BID  Instructed to rinse after use  - Cont  Albuterol PRN  - Recommend pulmonary rehab, transportation is prohibitive  - up to date on vaccines

## 2023-05-19 NOTE — PROGRESS NOTES
Pulmonary Follow Up Note   Krystian Tian 63 y o  male MRN: [id number]  5/19/2023  Virtual Visit      Reason for visit is follow up COPD    Encounter provider Deep Khan DO    Provider located at [address] [place] [address] 7 [facility] [id number]    Recent Visits  Date Type Provider Dept   05/18/23 Telephone Deep Khan DO [facility] [place]   05/15/23 Telephone Deep Khan DO [facility] [place]   Showing recent visits within past 7 days and meeting all other requirements  Today's Visits  Date Type Provider Dept   05/19/23 Telemedicine Deep Khan DO [facility] [place]   Showing today's visits and meeting all other requirements  Future Appointments  No visits were found meeting these conditions  Showing future appointments within next 150 days and meeting all other requirements       After connecting through [facility] because [facility] would not connect, the patient was identified by name and date of birth  Krystian Tian was informed that this is a telemedicine visit and that the visit is being conducted through [facility] since [address] would not connect  My office door was closed  No one else was in the room  He acknowledged consent and understanding of privacy and security of the video platform  The patient has agreed to participate and understands they can discontinue the visit at any time  Patient is aware this is a billable service  Referring provider:    Assessment and Plan:    Stage 4 very severe COPD by GOLD classification ([facility] [address] )  Recent exacerbation during hospitalizaiton  - Given severity of disease and now pt has insurance coverage, D/C Spiriva  Start Breztri BID  Instructed to rinse after use  - Cont  Albuterol PRN  - Recommend pulmonary rehab, transportation is prohibitive  - up to date on vaccines      Chronic respiratory failure with hypoxia ([address] )  Recent hospitalization showed pt "requires 4L oxygen with ambulation  - Rx oxygen with portablity evaluation    Cigarette nicotine dependence without complication  Smokes 1-5 cigarettes per day  - Encouraged cessation   - Qualifies for yearly screening CT scan, next scan April 2024    Alcohol abuse  Last drink before hospitalization 4/25/23  - encouraged ongoing alcohol cessation    Multiple pulmonary nodules  Stable since 2018, no follow up imaging needed  Macrocytic anemia  Pt noted to have macrocytic anemia in hospital  Stopped drinking alcohol  I encouraged him to see his PCP to discuss anemia and pursue further work up if indicated  Diagnoses and all orders for this visit:    Stage 4 very severe COPD by GOLD classification ([address] )  -     albuterol (ProAir HFA) 90 mcg/act inhaler; Inhale 2 puffs every 6 (six) hours as needed for wheezing  -     Budeson-Glycopyrrol-Formoterol (Breztri Aerosphere) 160-9-4 8 MCG/ACT AERO; Inhale 2 puffs 2 (two) times a day Rinse mouth after use  Chronic respiratory failure with hypoxia (HCC)  -     Home Oxygen with Portability    Cigarette nicotine dependence without complication    Multiple pulmonary nodules    Macrocytic anemia    Alcohol abuse    Hyperammonemia (HCC)  -     albuterol (ProAir HFA) 90 mcg/act inhaler; Inhale 2 puffs every 6 (six) hours as needed for wheezing    Discussed with pt  Concerns addressed  Return for follow up in 2 months or sooner if needed  History of Present Illness   HPI:  Wilbur Medina is a 63 y o  male who presents for follow up of COPD  Last seen by me in May 2022  States he is winded easily, worse since hospitalization  Denies cough  Discharged on 4L oxygen  Bought his own concentrator, states he could not wait for his insurance to bring it to him  Uses Albuterol nebulizer every 2 hours  Uses Albuterol inhaler as needed which is a few times per day  \"Somebody cut off my Ipratropium\"  Uses Spiriva twice daily      States he is weak, can't walk since " hospital discharge  Records reviewed  Pt hospitalized 4/25-5/6 with encephalopathy secondary alcohol abuse  Also treated for AECOPD  Complicated by anemia requiring blood transfusion  Smoking 1-5 cigarettes per day  Quit drinking since hospital stay  Was drinking bottle of kenji over few days  Review of Systems  Positive as above and negative otherwise    Historical Information   Past Medical History:   Diagnosis Date   • Anxiety    • Asthma    • Cardiac disease    • Cervical stenosis (uterine cervix)    • Chest pain    • Chronic kidney disease    • Colitis    • Colon polyps    • COPD (chronic obstructive pulmonary disease) (HCC)     2L @ HS and PRN   • Coronary artery disease    • Diverticulitis    • Esophageal reflux    • 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HCC)    • Hyperlipidemia    • Hypertension    • IBD (inflammatory bowel disease)    • Myocardial infarction [facility])    • Old myocardial infarction    • Perforation of colon (HCC)    • Type 2 diabetes, diet controlled ([facility] [address] )    • Ulcerative colitis ([facility] [address] )      Past Surgical History:   Procedure Laterality Date   • ANGIOPLASTY     • APPENDECTOMY     • COLON SURGERY     • COLONOSCOPY N/A 10/24/2016    Procedure: COLONOSCOPY;  Surgeon: Abigail Ortiz MD;  Location: [facility]; Service:    • CORONARY ANGIOPLASTY WITH STENT PLACEMENT     • ESOPHAGOGASTRODUODENOSCOPY N/A 10/24/2016    Procedure: ESOPHAGOGASTRODUODENOSCOPY (EGD); Surgeon: Abigail Ortiz MD;  Location: [facility];   Service:    • EXPLORATORY LAPAROTOMY W/ BOWEL RESECTION N/A 5/22/2018    Procedure: EXPLORATORY LAPAROTOMY, ILIOCOLECTOMY, ILIOCOLONIC ANASTAMOSIS, LOOP ILIOSTOMY, REPAIR OF SEROSAL TEAR, EXTENSIVE LYSIS OF ADHESIONS, WOUND VAC PLACEMENT;  Surgeon: Abigail Ortiz MD;  [facility];  Service: Colorectal   • HEMICOLECTOMY     • ILEOSTOMY CLOSURE N/A 10/5/2018    Procedure: CLOSURE ILEOSTOMY;  Surgeon: Abigail Ortiz MD;  [facility];  Service: Colorectal   • OTHER SURGICAL HISTORY      stent indications acute myocardial infarction   • OR COLONOSCOPY FLX DX W/COLLJ [facility] WHEN PFRMD N/A 2/6/2018    Procedure: COLONOSCOPY;  Surgeon: Chuy Cristobal MD;  [facility]; Service: Colorectal   • OR COLONOSCOPY FLX DX W/COLLJ SPEC WHEN PFRMD N/A 10/4/2018    Procedure: COLONOSCOPY;  Surgeon: Chuy Cristobal MD;  [facility];   Service: Colorectal   • TONSILLECTOMY       Family History   Problem Relation Age of Onset   • Coronary artery disease Father    • Crohn's disease Father    • Stroke Father    • Diabetes Family          Meds/Allergies     Current Outpatient Medications:   •  albuterol (2 5 mg/3 mL) 0 083 % nebulizer solution, Take 3 mL (2 5 mg total) by nebulization every 6 (six) hours as needed for wheezing or shortness of breath, Disp: 360 mL, Rfl: 3  •  albuterol (ProAir HFA) 90 mcg/act inhaler, Inhale 2 puffs every 6 (six) hours as needed for wheezing, Disp: 18 g, Rfl: 0  •  atorvastatin (LIPITOR) 40 mg tablet, TAKE 1 TABLET BY MOUTH DAILY WITH DINNER, Disp: 90 tablet, Rfl: 3  •  levothyroxine 50 mcg tablet, Take 1 tablet (50 mcg total) by mouth daily in the early morning Do not start before May 7, 2023 , Disp: 30 tablet, Rfl: 0  •  midodrine (PROAMATINE) 10 MG tablet, Take 1 tablet (10 mg total) by mouth 3 (three) times a day before meals, Disp: 270 tablet, Rfl: 0  •  Multiple Vitamins-Minerals (Centrum Silver 50+Men) TABS, Take by mouth, Disp: , Rfl:   •  sulfaSALAzine (AZULFIDINE) 500 mg tablet, Take 1 tablet (500 mg total) by mouth 4 (four) times a day, Disp: 360 tablet, Rfl: 0  •  tiotropium (Spiriva Respimat) 1 25 MCG/ACT AERS inhaler, Inhale 2 puffs daily, Disp: 4 g, Rfl: 0  •  albuterol (PROVENTIL HFA,VENTOLIN HFA) 90 mcg/act inhaler, INHALE 2 PUFFS BY MOUTH EVERY 6 HOURS AS NEEDED FOR WHEEZING, Disp: 18 g, Rfl: 5  •  aspirin 81 mg chewable tablet, Chew 1 tablet (81 mg total) daily (Patient not taking: Reported on 5/19/2023), Disp: 90 "tablet, Rfl: 0  •  gabapentin (NEURONTIN) 300 mg capsule, TAKE 1 CAPSULE BY MOUTH THREE TIMES A DAY (Patient not taking: Reported on 2023), Disp: 90 capsule, Rfl: 3  •  Nebulizers (AERONEB GO NEBULIZER HANDSET) MISC, by Does not apply route 2 (two) times a day as needed (wheezing) (Patient not taking: Reported on 2020), Disp: 1 each, Rfl: 0  Allergies   Allergen Reactions   • Medical Tape    • Other      Brown cloth band aids      • Latex Rash       Vitals: Height 5' 6\" (1 676 m)  Body mass index is 26 9 kg/m²  Oxygen Therapy  Oxygen Therapy: None (Room air)      Physical Exam  GEN: WDWN, nad, mild conversational dyspnea  HEENT: NCAT, EOMI  CVS: well perfused  LUNGS: mild breathlessness with conversation  EXT: No UE deformity  NEURO: No focal deficits  MS: Moving all extremities  SKIN: warm, dry  PSYCH: calm, cooperative    Labs: I have personally reviewed pertinent lab results  Lab Results   Component Value Date    WBC 10 42 (H) 2023    HGB 8 6 (L) 2023    HCT 27 5 (L) 2023     (H) 2023     2023     Lab Results   Component Value Date    GLUCOSE 92 2018    CALCIUM 7 8 (L) 2023     2015    K 3 9 2023    CO2 30 2023     2023    BUN 17 2023    CREATININE 0 82 2023     No results found for: IGE  Lab Results   Component Value Date    ALT 41 2023    AST 40 2023    ALKPHOS 207 (H) 2023    BILITOT 0 58 2015       Labs per my interpretation show normal renal function; anemia    Imaging and other studies: I have personally reviewed pertinent films in PACS  CXR per my review shows atelectasis    Pulmonary function testin/20: very severe obstruction FEV1 1 48L 46% predicted    EKG, Pathology, and Other Studies: I have personally reviewed pertinent reports      ECHO 23: EF mildly reduced; RV function normal        VIRTUAL VISIT DISCLAIMER     Angy Almaraz acknowledges that he has consented " to an online visit or consultation  He understands that the online visit is based solely on information provided by him, and that, in the absence of a face-to-face physical evaluation by the physician, the diagnosis he receives is both limited and provisional in terms of accuracy and completeness  This is not intended to replace a full medical face-to-face evaluation by the physician  Tonja Amaral understands and accepts these terms  Nat Jones DO  [facility]

## 2023-05-19 NOTE — ASSESSMENT & PLAN NOTE
Smokes 1-5 cigarettes per day  - Encouraged cessation   - Qualifies for yearly screening CT scan, next scan April 2024

## 2023-05-19 NOTE — ASSESSMENT & PLAN NOTE
Pt noted to have macrocytic anemia in hospital  Stopped drinking alcohol  I encouraged him to see his PCP to discuss anemia and pursue further work up if indicated

## 2023-05-24 NOTE — TELEPHONE ENCOUNTER
Per [employer]-- We are not able to accept the inpatient stay O2 testing from 5/5  The patient would be required to come into the office for a 6MWT, as it is more than 48 hours since the patient has been discharged  This order will reflect as canceled  A new order will need to be submitted once new testing is obtained     I called and informed the patient he needs to come into the office for a face-to-face visit and 6MW test before we can order any oxygen for him, he understood  He stated he will call us back to make an appointment

## 2023-05-24 NOTE — TELEPHONE ENCOUNTER
Pt has called in stating he had not heard from [employer] and when reaching out to them today was advised they do not have the order for a POC nor do they carry the POC  Pt requesting assistance in obtaining a POC   Please advise

## 2023-07-24 ENCOUNTER — TELEPHONE (OUTPATIENT)
Dept: PULMONOLOGY | Facility: CLINIC | Age: 63
End: 2023-07-24

## 2023-07-24 DIAGNOSIS — E72.20 HYPERAMMONEMIA (HCC): ICD-10-CM

## 2023-07-24 DIAGNOSIS — J44.9 STAGE 4 VERY SEVERE COPD BY GOLD CLASSIFICATION (HCC): ICD-10-CM

## 2023-07-24 RX ORDER — ALBUTEROL SULFATE 90 UG/1
2 AEROSOL, METERED RESPIRATORY (INHALATION) EVERY 6 HOURS PRN
Qty: 18 G | Refills: 5 | Status: SHIPPED | OUTPATIENT
Start: 2023-07-24

## 2023-07-24 RX ORDER — ALBUTEROL SULFATE 90 UG/1
AEROSOL, METERED RESPIRATORY (INHALATION)
OUTPATIENT
Start: 2023-07-24

## 2023-07-24 NOTE — TELEPHONE ENCOUNTER
Patient hasn't been seen in over 3 years. Please call to see if patient would still like to be seen at our practice, if not send message to care gap team. No refills will be sent.

## 2023-08-06 ENCOUNTER — HOSPITAL ENCOUNTER (EMERGENCY)
Facility: HOSPITAL | Age: 63
Discharge: HOME | End: 2023-08-07
Attending: EMERGENCY MEDICINE
Payer: COMMERCIAL

## 2023-08-06 ENCOUNTER — APPOINTMENT (EMERGENCY)
Dept: RADIOLOGY | Facility: HOSPITAL | Age: 63
End: 2023-08-06
Payer: COMMERCIAL

## 2023-08-06 DIAGNOSIS — I47.1 SVT (SUPRAVENTRICULAR TACHYCARDIA) (HCC): Primary | ICD-10-CM

## 2023-08-06 DIAGNOSIS — R77.8 ELEVATED TROPONIN: ICD-10-CM

## 2023-08-06 LAB
ALBUMIN SERPL BCP-MCNC: 3.2 G/DL (ref 3.5–5)
ALP SERPL-CCNC: 116 U/L (ref 46–116)
ALT SERPL W P-5'-P-CCNC: 28 U/L (ref 12–78)
ANION GAP SERPL CALCULATED.3IONS-SCNC: 2 MMOL/L
AST SERPL W P-5'-P-CCNC: 16 U/L (ref 5–45)
BASOPHILS # BLD AUTO: 0.05 THOUSANDS/ÂΜL (ref 0–0.1)
BASOPHILS NFR BLD AUTO: 0 % (ref 0–1)
BILIRUB SERPL-MCNC: 0.22 MG/DL (ref 0.2–1)
BUN SERPL-MCNC: 21 MG/DL (ref 5–25)
CALCIUM ALBUM COR SERPL-MCNC: 9.4 MG/DL (ref 8.3–10.1)
CALCIUM SERPL-MCNC: 8.8 MG/DL (ref 8.3–10.1)
CARDIAC TROPONIN I PNL SERPL HS: 28 NG/L
CHLORIDE SERPL-SCNC: 115 MMOL/L (ref 96–108)
CO2 SERPL-SCNC: 24 MMOL/L (ref 21–32)
CREAT SERPL-MCNC: 1.36 MG/DL (ref 0.6–1.3)
EOSINOPHIL # BLD AUTO: 0.39 THOUSAND/ÂΜL (ref 0–0.61)
EOSINOPHIL NFR BLD AUTO: 3 % (ref 0–6)
ERYTHROCYTE [DISTWIDTH] IN BLOOD BY AUTOMATED COUNT: 15.9 % (ref 11.6–15.1)
GFR SERPL CREATININE-BSD FRML MDRD: 55 ML/MIN/1.73SQ M
GLUCOSE SERPL-MCNC: 110 MG/DL (ref 65–140)
HCT VFR BLD AUTO: 42.8 % (ref 36.5–49.3)
HGB BLD-MCNC: 13.5 G/DL (ref 12–17)
IMM GRANULOCYTES # BLD AUTO: 0.03 THOUSAND/UL (ref 0–0.2)
IMM GRANULOCYTES NFR BLD AUTO: 0 % (ref 0–2)
LYMPHOCYTES # BLD AUTO: 1.88 THOUSANDS/ÂΜL (ref 0.6–4.47)
LYMPHOCYTES NFR BLD AUTO: 16 % (ref 14–44)
MCH RBC QN AUTO: 27.9 PG (ref 26.8–34.3)
MCHC RBC AUTO-ENTMCNC: 31.5 G/DL (ref 31.4–37.4)
MCV RBC AUTO: 88 FL (ref 82–98)
MONOCYTES # BLD AUTO: 0.91 THOUSAND/ÂΜL (ref 0.17–1.22)
MONOCYTES NFR BLD AUTO: 8 % (ref 4–12)
NEUTROPHILS # BLD AUTO: 8.8 THOUSANDS/ÂΜL (ref 1.85–7.62)
NEUTS SEG NFR BLD AUTO: 73 % (ref 43–75)
NRBC BLD AUTO-RTO: 0 /100 WBCS
PLATELET # BLD AUTO: 247 THOUSANDS/UL (ref 149–390)
PMV BLD AUTO: 10.1 FL (ref 8.9–12.7)
POTASSIUM SERPL-SCNC: 3.9 MMOL/L (ref 3.5–5.3)
PROT SERPL-MCNC: 7.2 G/DL (ref 6.4–8.4)
RBC # BLD AUTO: 4.84 MILLION/UL (ref 3.88–5.62)
SODIUM SERPL-SCNC: 141 MMOL/L (ref 135–147)
WBC # BLD AUTO: 12.06 THOUSAND/UL (ref 4.31–10.16)

## 2023-08-06 PROCEDURE — 99285 EMERGENCY DEPT VISIT HI MDM: CPT

## 2023-08-06 PROCEDURE — 99285 EMERGENCY DEPT VISIT HI MDM: CPT | Performed by: EMERGENCY MEDICINE

## 2023-08-06 PROCEDURE — 80053 COMPREHEN METABOLIC PANEL: CPT | Performed by: STUDENT IN AN ORGANIZED HEALTH CARE EDUCATION/TRAINING PROGRAM

## 2023-08-06 PROCEDURE — 36415 COLL VENOUS BLD VENIPUNCTURE: CPT | Performed by: STUDENT IN AN ORGANIZED HEALTH CARE EDUCATION/TRAINING PROGRAM

## 2023-08-06 PROCEDURE — 84484 ASSAY OF TROPONIN QUANT: CPT | Performed by: STUDENT IN AN ORGANIZED HEALTH CARE EDUCATION/TRAINING PROGRAM

## 2023-08-06 PROCEDURE — 74177 CT ABD & PELVIS W/CONTRAST: CPT

## 2023-08-06 PROCEDURE — G1004 CDSM NDSC: HCPCS

## 2023-08-06 PROCEDURE — 93005 ELECTROCARDIOGRAM TRACING: CPT

## 2023-08-06 PROCEDURE — 84443 ASSAY THYROID STIM HORMONE: CPT | Performed by: STUDENT IN AN ORGANIZED HEALTH CARE EDUCATION/TRAINING PROGRAM

## 2023-08-06 PROCEDURE — 85025 COMPLETE CBC W/AUTO DIFF WBC: CPT | Performed by: STUDENT IN AN ORGANIZED HEALTH CARE EDUCATION/TRAINING PROGRAM

## 2023-08-06 RX ORDER — AMIODARONE HYDROCHLORIDE 50 MG/ML
1 INJECTION, SOLUTION INTRAVENOUS ONCE
Status: COMPLETED | OUTPATIENT
Start: 2023-08-06 | End: 2023-08-07

## 2023-08-06 RX ORDER — MIDAZOLAM HYDROCHLORIDE 1 MG/ML
1 INJECTION INTRAMUSCULAR; INTRAVENOUS ONCE
Status: COMPLETED | OUTPATIENT
Start: 2023-08-06 | End: 2023-08-07

## 2023-08-06 RX ADMIN — IOHEXOL 85 ML: 350 INJECTION, SOLUTION INTRAVENOUS at 23:57

## 2023-08-07 VITALS
HEART RATE: 86 BPM | DIASTOLIC BLOOD PRESSURE: 63 MMHG | SYSTOLIC BLOOD PRESSURE: 101 MMHG | TEMPERATURE: 98.5 F | RESPIRATION RATE: 20 BRPM | OXYGEN SATURATION: 93 %

## 2023-08-07 LAB
2HR DELTA HS TROPONIN: 203 NG/L
ATRIAL RATE: 441 BPM
CARDIAC TROPONIN I PNL SERPL HS: 231 NG/L
P AXIS: 85 DEGREES
QRS AXIS: 92 DEGREES
QRSD INTERVAL: 104 MS
QT INTERVAL: 302 MS
QTC INTERVAL: 404 MS
T WAVE AXIS: 84 DEGREES
TSH SERPL DL<=0.05 MIU/L-ACNC: 3.32 UIU/ML (ref 0.45–4.5)
VENTRICULAR RATE: 108 BPM

## 2023-08-07 PROCEDURE — 96365 THER/PROPH/DIAG IV INF INIT: CPT

## 2023-08-07 PROCEDURE — 36415 COLL VENOUS BLD VENIPUNCTURE: CPT | Performed by: STUDENT IN AN ORGANIZED HEALTH CARE EDUCATION/TRAINING PROGRAM

## 2023-08-07 PROCEDURE — 84484 ASSAY OF TROPONIN QUANT: CPT | Performed by: STUDENT IN AN ORGANIZED HEALTH CARE EDUCATION/TRAINING PROGRAM

## 2023-08-07 PROCEDURE — 93010 ELECTROCARDIOGRAM REPORT: CPT | Performed by: INTERNAL MEDICINE

## 2023-08-07 RX ORDER — MIDAZOLAM HYDROCHLORIDE 5 MG/ML
INJECTION, SOLUTION INTRAMUSCULAR; INTRAVENOUS
Status: DISCONTINUED
Start: 2023-08-07 | End: 2023-08-07 | Stop reason: WASHOUT

## 2023-08-07 RX ADMIN — SODIUM CHLORIDE, SODIUM LACTATE, POTASSIUM CHLORIDE, AND CALCIUM CHLORIDE 500 ML: .6; .31; .03; .02 INJECTION, SOLUTION INTRAVENOUS at 01:18

## 2023-08-07 NOTE — ED PROVIDER NOTES
History  Chief Complaint   Patient presents with   • Irregular Heart Beat     Per EMS, pt felt dizzy walking from his kitchen. EMS found HR in 240s. Gave 1mg of versed to cardiovert and 150 mg of amio. 82-year-old male with past history significant for hyperlipidemia, A-fib with RVR, MI with stent placement, COPD requiring oxygen at home presents today with abrupt onset rapid heartbeat. Patient states he was in his kitchen this evening making dinner when he started to feel his heart racing and he felt dizzy. Patient denies chest pain or shortness of breath associated with it. He decided to call EMS and was transported to the hospital.  On route he was hooked up to the monitor and was found to be with pattern resembling SVT with unstable vital signs and blood pressure dropping. Due to was given for on route sedation and cardioversion. Patient received 100 J shock which was successful and returned to normal sinus rhythm. Upon presentation the patient appeared stable and was communicating and was in no apparent distress. He states that he is feeling much better. He noticed after the shock his abdomen began pulsating. He has history of hernia thereafter colectomy but has never noticed a pulsating in the past.  He states he is on several medications for his heart but does not take them with any regularity. Currently denies chest pain, shortness of breath, nausea, vomiting, headache, lightheadedness, visual disturbances, sweating, or abdominal pain. Patient feels that he is ready go home. Prior to Admission Medications   Prescriptions Last Dose Informant Patient Reported? Taking? Budeson-Glycopyrrol-Formoterol (Breztri Aerosphere) 160-9-4.8 MCG/ACT AERO   No No   Sig: Inhale 2 puffs 2 (two) times a day Rinse mouth after use.    Multiple Vitamins-Minerals (Centrum Silver 50+Men) TABS  Self Yes No   Sig: Take by mouth   Nebulizers (AERONEB GO NEBULIZER HANDSET) MISC  Self No No   Sig: by Does not apply route 2 (two) times a day as needed (wheezing)   Patient not taking: Reported on 2/11/2020   albuterol (2.5 mg/3 mL) 0.083 % nebulizer solution  Self No No   Sig: Take 3 mL (2.5 mg total) by nebulization every 6 (six) hours as needed for wheezing or shortness of breath   albuterol (ProAir HFA) 90 mcg/act inhaler   No No   Sig: Inhale 2 puffs every 6 (six) hours as needed for wheezing   atorvastatin (LIPITOR) 40 mg tablet  Self No No   Sig: TAKE 1 TABLET BY MOUTH DAILY WITH DINNER   levothyroxine 50 mcg tablet  Self No No   Sig: Take 1 tablet (50 mcg total) by mouth daily in the early morning Do not start before May 7, 2023. midodrine (PROAMATINE) 10 MG tablet  Self No No   Sig: Take 1 tablet (10 mg total) by mouth 3 (three) times a day before meals   sulfaSALAzine (AZULFIDINE) 500 mg tablet  Self No No   Sig: Take 1 tablet (500 mg total) by mouth 4 (four) times a day      Facility-Administered Medications: None       Past Medical History:   Diagnosis Date   • Anxiety    • Asthma    • Cardiac disease    • Cervical stenosis (uterine cervix)    • Chest pain    • Chronic kidney disease    • Colitis    • Colon polyps    • COPD (chronic obstructive pulmonary disease) (HCC)     2L @ HS and PRN   • Coronary artery disease    • Diverticulitis    • Esophageal reflux    • 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HCC)    • Hyperlipidemia    • Hypertension    • IBD (inflammatory bowel disease)    • Myocardial infarction [facility])    • Old myocardial infarction    • Perforation of colon (HCC)    • Type 2 diabetes, diet controlled ([address])    • Ulcerative colitis ([address])        Past Surgical History:   Procedure Laterality Date   • ANGIOPLASTY     • APPENDECTOMY     • COLON SURGERY     • COLONOSCOPY N/A 10/24/2016    Procedure: COLONOSCOPY;  Surgeon: Aury Nobles MD;  Location: [facility];   Service:    • CORONARY ANGIOPLASTY WITH STENT PLACEMENT     • ESOPHAGOGASTRODUODENOSCOPY N/A 10/24/2016    Procedure: ESOPHAGOGASTRODUODENOSCOPY (EGD); Surgeon: Heidy Harris MD;  Location: [facility]; Service:    • EXPLORATORY LAPAROTOMY W/ BOWEL RESECTION N/A 5/22/2018    Procedure: EXPLORATORY LAPAROTOMY, ILIOCOLECTOMY, ILIOCOLONIC ANASTAMOSIS, LOOP ILIOSTOMY, REPAIR OF SEROSAL TEAR, EXTENSIVE LYSIS OF ADHESIONS, WOUND VAC PLACEMENT;  Surgeon: Heidy Harris MD;  Location: [facility];  Service: Colorectal   • HEMICOLECTOMY     • ILEOSTOMY CLOSURE N/A 10/5/2018    Procedure: Volney Locks;  Surgeon: Heidy Harris MD;  [facility];  Service: Colorectal   • OTHER SURGICAL HISTORY      stent indications acute myocardial infarction   • KS COLONOSCOPY FLX DX W/COLLJ SPEC WHEN PFRMD N/A 2/6/2018    Procedure: COLONOSCOPY;  Surgeon: Heidy Harris MD;  [facility]: [facility]; Service: Colorectal   • KS COLONOSCOPY FLX DX W/COLLJ SPEC WHEN PFRMD N/A 10/4/2018    Procedure: COLONOSCOPY;  Surgeon: Heidy Harris MD;  [facility]; Service: Colorectal   • TONSILLECTOMY         Family History   Problem Relation Age of Onset   • Coronary artery disease Father    • Crohn's disease Father    • Stroke Father    • Diabetes Family      I have reviewed and agree with the history as documented. E-Cigarette/Vaping   • E-Cigarette Use Never User      E-Cigarette/Vaping Substances   • Nicotine No    • THC No    • CBD No    • Flavoring No    • Other No    • Unknown No      Social History     Tobacco Use   • Smoking status: Every Day     Types: Cigars, Cigarettes   • Smokeless tobacco: Never   • Tobacco comments:     4-5cigars per day before nowadays smokes about 1 cigar, trying to cut down. Vaping Use   • Vaping Use: Never used   Substance Use Topics   • Alcohol use: Not Currently   • Drug use: No        Review of Systems   Constitutional: Negative for chills, fatigue and fever. Eyes: Negative for visual disturbance. Respiratory: Negative for cough, choking, chest tightness, shortness of breath and wheezing.     Cardiovascular: Negative for chest pain, palpitations and leg swelling. Gastrointestinal: Negative for abdominal pain. Musculoskeletal: Negative for back pain, neck pain and neck stiffness. Skin: Negative for rash. Neurological: Negative for dizziness, syncope, facial asymmetry, speech difficulty, weakness, light-headedness, numbness and headaches. Psychiatric/Behavioral: Negative for agitation and confusion. Physical Exam  ED Triage Vitals [08/06/23 2205]   Temperature Pulse Respirations Blood Pressure SpO2   98.5 °F (36.9 °C) (!) 108 18 95/65 95 %      Temp Source Heart Rate Source Patient Position - Orthostatic VS BP Location FiO2 (%)   Oral Monitor Lying Right arm --      Pain Score       --             Orthostatic Vital Signs  Vitals:    08/06/23 2205 08/06/23 2330 08/07/23 0100 08/07/23 0200   BP: 95/65 93/62 98/60 101/63   Pulse: (!) 108 102 90 86   Patient Position - Orthostatic VS: Lying Lying Lying Lying       Physical Exam  Constitutional:       General: He is not in acute distress. Appearance: He is normal weight. He is not ill-appearing, toxic-appearing or diaphoretic. HENT:      Head: Normocephalic and atraumatic. Nose: Nose normal.      Mouth/Throat:      Mouth: Mucous membranes are moist.      Pharynx: Oropharynx is clear. No oropharyngeal exudate or posterior oropharyngeal erythema. Eyes:      Extraocular Movements: Extraocular movements intact. Pupils: Pupils are equal, round, and reactive to light. Cardiovascular:      Rate and Rhythm: Normal rate. Rhythm irregular. Pulses: Normal pulses. Heart sounds: Normal heart sounds. No murmur heard. No friction rub. No gallop. Pulmonary:      Effort: Pulmonary effort is normal. No respiratory distress. Breath sounds: Normal breath sounds. No wheezing. Abdominal:      General: Abdomen is flat. Bowel sounds are normal.      Palpations: Abdomen is soft. Tenderness: There is no abdominal tenderness. There is no guarding. Hernia: A hernia is present. Comments: Hernia is reducible at border of surgical incision  from colectomy. No pulsatile mass noted on examination. Musculoskeletal:      Cervical back: Normal range of motion. No rigidity. Skin:     General: Skin is warm and dry. Capillary Refill: Capillary refill takes less than 2 seconds. Neurological:      General: No focal deficit present. Mental Status: He is alert and oriented to person, place, and time.    Psychiatric:         Mood and Affect: Mood normal.         Behavior: Behavior normal.         ED Medications  Medications   amiodarone (FOR EMS ONLY) 150 mg/3 mL injection 150 mg (0 mg Does not apply Given to EMS 8/7/23 0135)   midazolam (FOR EMS ONLY) (VERSED) 2 mg/2 mL injection 2 mg (0 mg Does not apply Given to EMS 8/7/23 0135)   iohexol (OMNIPAQUE) 350 MG/ML injection (SINGLE-DOSE) 85 mL (85 mL Intravenous Given 8/6/23 2357)   lactated ringers bolus 500 mL (0 mL Intravenous Stopped 8/7/23 0147)       Diagnostic Studies  Results Reviewed     Procedure Component Value Units Date/Time    HS Troponin I 2hr [[id number]]  (Abnormal) Collected: 08/07/23 0121    Lab Status: Final result Specimen: Blood from Arm, Right Updated: 08/07/23 0156     hs TnI 2hr 231 ng/L      Delta 2hr hsTnI 203 ng/L     HS Troponin I 4hr [[id number]]     Lab Status: No result Specimen: Blood     TSH [[id number]]  (Normal) Collected: 08/06/23 2255    Lab Status: Final result Specimen: Blood from Arm, Right Updated: 08/07/23 0002     TSH 3RD GENERATON 3.324 uIU/mL     HS Troponin 0hr (reflex protocol) [[id number]]  (Normal) Collected: 08/06/23 2255    Lab Status: Final result Specimen: Blood from Arm, Right Updated: 08/06/23 2354     hs TnI 0hr 28 ng/L     Comprehensive metabolic panel [[id number]]  (Abnormal) Collected: 08/06/23 2255    Lab Status: Final result Specimen: Blood from Arm, Right Updated: 08/06/23 2335     Sodium 141 mmol/L      Potassium 3.9 mmol/L      Chloride 115 mmol/L      CO2 24 mmol/L      ANION GAP 2 mmol/L      BUN 21 mg/dL      Creatinine 1.36 mg/dL      Glucose 110 mg/dL      Calcium 8.8 mg/dL      Corrected Calcium 9.4 mg/dL      AST 16 U/L      ALT 28 U/L      Alkaline Phosphatase 116 U/L      Total Protein 7.2 g/dL      Albumin 3.2 g/dL      Total Bilirubin 0.22 mg/dL      eGFR 55 ml/min/1.73sq m     Narrative:      [place] guidelines for Chronic Kidney Disease (CKD):   •  Stage 1 with normal or high GFR (GFR > 90 mL/min/1.73 square meters)  •  Stage 2 Mild CKD (GFR = 60-89 mL/min/1.73 square meters)  •  Stage 3A Moderate CKD (GFR = 45-59 mL/min/1.73 square meters)  •  Stage 3B Moderate CKD (GFR = 30-44 mL/min/1.73 square meters)  •  Stage 4 Severe CKD (GFR = 15-29 mL/min/1.73 square meters)  •  Stage 5 End Stage CKD (GFR <15 mL/min/1.73 square meters)  Note: GFR calculation is accurate only with a steady state creatinine    CBC and differential [[id number]]  (Abnormal) Collected: 08/06/23 2255    Lab Status: Final result Specimen: Blood from Arm, Right Updated: 08/06/23 2306     WBC 12.06 Thousand/uL      RBC 4.84 Million/uL      Hemoglobin 13.5 g/dL      Hematocrit 42.8 %      MCV 88 fL      MCH 27.9 pg      MCHC 31.5 g/dL      RDW 15.9 %      MPV 10.1 fL      Platelets 863 Thousands/uL      nRBC 0 /100 WBCs      Neutrophils Relative 73 %      Immat GRANS % 0 %      Lymphocytes Relative 16 %      Monocytes Relative 8 %      Eosinophils Relative 3 %      Basophils Relative 0 %      Neutrophils Absolute 8.80 Thousands/µL      Immature Grans Absolute 0.03 Thousand/uL      Lymphocytes Absolute 1.88 Thousands/µL      Monocytes Absolute 0.91 Thousand/µL      Eosinophils Absolute 0.39 Thousand/µL      Basophils Absolute 0.05 Thousands/µL                  CT abdomen pelvis w contrast   Final Result by Stefano Wall DO (08/07 0104)      [place] ventral abdominal hernia with protrusion of the transverse colon through the defect without evidence of obstruction      Mild inflammatory change about the descending colon, which may represent low-grade colitis. Mild wall thickening of the urinary bladder is nonspecific. If there is concern for cystitis, correlate with urinalysis. Additional nonacute findings, as described. The study was marked in [facility] for immediate notification. Workstation performed: [id number]               Procedures  ECG 12 Lead Documentation Only    Date/Time: 8/6/2023 11:22 PM    Performed by: Yan Emmanuel MD  Authorized by: Yan Emmanuel MD    ECG reviewed by me, the ED Provider: yes    Patient location:  ED  Previous ECG:     Previous ECG:  Compared to current    Comparison ECG info: May 2003    Similarity:  Changes noted    Comparison to cardiac monitor: Yes    Interpretation:     Interpretation: abnormal    Rate:     ECG rate assessment: normal    Rhythm:     Rhythm: sinus rhythm and other rhythm    QRS:     QRS axis:  Right  Conduction:     Conduction: normal    ST segments:     ST segments:  Normal  T waves:     T waves: inverted            ED Course  ED Course as of 08/07/23 0245   Mon Aug 07, 2023   0054 Checked on patient he is stable upon reassessment. Still pending formal read of CT scan which patient was informed of.    0127 CT abdomen pelvis w contrast                             SBIRT 20yo+    Flowsheet Row Most Recent Value   Initial Alcohol Screen: US AUDIT-C     1. How often do you have a drink containing alcohol? 0 Filed at: 08/06/2023 2211   2. How many drinks containing alcohol do you have on a typical day you are drinking? 0 Filed at: 08/06/2023 2211   3a. Male UNDER 65: How often do you have five or more drinks on one occasion? 0 Filed at: 08/06/2023 2211   Audit-C Score 0 Filed at: 08/06/2023 2211   CASEY: How many times in the past year have you. .. Used an illegal drug or used a prescription medication for non-medical reasons?  Never Filed at: 08/06/2023 2211 Medical Decision Making  42-year-old male with history of COPD requiring oxygen, myocardial infarction, A-fib with RVR, status post colectomy with abdominal hernia presents today after acute onset SVT versus V. tach occurred at home. Cardioversion field was successful and patient brought to emergency department in stable condition. Initial assessment revealed normalized blood pressure and heart rate. No airway compromise, no no circulation compromise. Examination revealed large abdominal hernia from past procedure but no pulsatile mass was noted on examination despite patient's report. The pulsatile mass midperiphery to his status post cardioversion and may have been some diaphragmatic or abdominal muscle spasm. However, given his altered anatomy and extensive cardiac history will perform CT of the abdomen and pelvis to rule out AAA. EKG was obtained and confirmed the patient return to sinus rhythm. Other findings on EKG indicated known pathology of the heart, but nothing acute that required emergent intervention. Patient will be worked up to determine possible underlying etiology, but given his extensive cardiac hx, it may be due to diseased heart. Pending labs and CT report. CT report confirmed presence of hernia and possible colitis however patient is asymptomatic. No evidence for AAA or other acute thoracic processes requiring emergent treatment. Opponent levels continue to rise are likely explained by his 240 bpm heart rate during the SVT episode and the fact that he was shocked during cardioversion. We discussed with the patient that he should stay at least 1 night for observation however he is not amenable to that plan. After reviewing risks the patient he decided to sign out AMA. The patient was respectful and coherent throughout this decision-making process. Papers were signed and he was discharged home in stable condition.   Clear emergency room return precautions were reviewed with the patient to which he verbalized understanding. Labs reveal elevated troponin which can be expected post SVT/ VT with heart rate into 40s and cardioversion shock. Mild DEBBY also observed on labs. We will begin infusion 500 mL bolus of LR. Pending CT. Patient was reassessed and is stable and asking to go home. Discussed that we are still waiting for formal CT read prior to any decision making. Patient verbalized understanding. Amount and/or Complexity of Data Reviewed  Labs: ordered. Radiology: ordered. Decision-making details documented in ED Course. Risk  Prescription drug management. Disposition  Final diagnoses:   SVT (supraventricular tachycardia) (HCC)   Elevated troponin     Time reflects when diagnosis was documented in both MDM as applicable and the Disposition within this note     Time User Action Codes Description Comment    8/7/2023  2:16 AM Buzzy Bushy Add [I47.1] SVT (supraventricular tachycardia) ([address])     8/7/2023  2:16 AM Buzzy Bushy Add [R77.8] Elevated troponin       ED Disposition     ED Disposition   AMA    Condition   --    Date/Time   Mon Aug 7, 2023  2:16 AM    Comment   Date: 8/7/2023  Patient: Lyubov Alcantara  Admitted: 8/6/2023 10:02 PM  Attending Provider: Emma Torres MD    Lyubov Alcantara or his authorized caregiver has made the decision for the patient to leave the emergency department against the adv ice of the emergency department staff. He or his authorized caregiver has been informed and understands the inherent risks, including death, permanent disability. He or his authorized caregiver has decided to accept the responsibility for this decis ion. Lyubov Alcantara and all necessary parties have been advised that he may return for further evaluation or treatment. His condition at time of discharge was stable.   Lyubov Alcantara had current vital signs as follows:  /63 (BP Location: Ri ght arm)   Pulse 86   Temp 98.5 °F (36.9 °C) (Oral)   Resp 20            Follow-up Information     Follow up With Specialties Details Why [address] [facility] Emergency Department Emergency Medicine  If symptoms worsen [address] [id number]  [facility], [address], [place], [place], [facility]    [address] Cardiology Schedule an appointment as soon as possible for a visit   [address]  Ernie Batista, [address], [place], [place], [address] [place] [address]          Discharge Medication List as of 8/7/2023  2:28 AM      CONTINUE these medications which have NOT CHANGED    Details   albuterol (2.5 mg/3 mL) 0.083 % nebulizer solution Take 3 mL (2.5 mg total) by nebulization every 6 (six) hours as needed for wheezing or shortness of breath, Starting Mon 5/15/2023, Normal      albuterol (ProAir HFA) 90 mcg/act inhaler Inhale 2 puffs every 6 (six) hours as needed for wheezing, Starting Mon 7/24/2023, Normal      atorvastatin (LIPITOR) 40 mg tablet TAKE 1 TABLET BY MOUTH DAILY WITH DINNER, Normal      Budeson-Glycopyrrol-Formoterol (Breztri Aerosphere) 160-9-4.8 MCG/ACT AERO Inhale 2 puffs 2 (two) times a day Rinse mouth after use., Starting Fri 5/19/2023, Normal      levothyroxine 50 mcg tablet Take 1 tablet (50 mcg total) by mouth daily in the early morning Do not start before May 7, 2023., Starting Sun 5/7/2023, Until Tue 6/6/2023, Normal      midodrine (PROAMATINE) 10 MG tablet Take 1 tablet (10 mg total) by mouth 3 (three) times a day before meals, Starting Sat 5/6/2023, Until Fri 8/4/2023, Normal      Multiple Vitamins-Minerals (Centrum Silver 50+Men) TABS Take by mouth, Historical Med      Nebulizers (AERONEB GO NEBULIZER HANDSET) MISC by Does not apply route 2 (two) times a day as needed (wheezing), Starting Fri 6/8/2018, Normal      sulfaSALAzine (AZULFIDINE) 500 mg tablet Take 1 tablet (500 mg total) by mouth 4 (four) times a day, Starting Sat 5/6/2023, Until Fri 8/4/2023, Normal               PDMP Review     None           ED Provider  Attending physically available and evaluated Dimas Solo. I managed the patient along with the ED Attending.     Electronically Signed by         Evelina Newberry MD  08/07/23 8853

## 2023-08-09 NOTE — ED ATTENDING ATTESTATION
8/6/2023  I, Tonia Condon MD, saw and evaluated the patient. I have discussed the patient with the resident/non-physician practitioner and agree with the resident's/non-physician practitioner's findings, Plan of Care, and MDM as documented in the resident's/non-physician practitioner's note, except where noted. All available labs and Radiology studies were reviewed. I was present for key portions of any procedure(s) performed by the resident/non-physician practitioner and I was immediately available to provide assistance. At this point I agree with the current assessment done in the Emergency Department. I have conducted an independent evaluation of this patient a history and physical is as follows:    ED Course      Patient is a 73-year-old male with a history of COPD, tobacco abuse, alcohol abuse, CAD, COPD, A-fib with RVR and MI with stent placement who presents with abrupt onset rapid heartbeat. Per patient and EMS report patient was standing in the kitchen seem to make dinner when he felt his heart racing felt dizzy. Denies any chest pain or shortness of breath associated with it called EMS who upon arrival noted patient to have heart rate in the 240s with a wide-complex tachycardia. Patient received 150 mg of amiodarone in field without improvement of wide-complex tachycardia patient noted to have hypotension at that time. Discussion between EMS and command physician was to proceed with DCCV on site. Patient underwent 1 shock at 100 J with return to normal sinus rhythm. Upon arrival to the emergency department patient feels better states his heart rate is normalized at this time. Patient noted that his abdomen was pulsatile during time of fast heartbeat patient does have a history of abdominal hernia as well as a colostomy which was reversed. Vitals reviewed. Heart regular rate and rhythm without murmurs. Lungs clear auscultation bilaterally. Abdomen soft nontender nondistended. Old surgical scar noted with obvious ventral hernia present. Impression: Irregular heartbeat  Differential diagnosis: SVT with aberrancy, ventricular tachycardia, ACS, MI, electrolyte abnormality symptomatic anemia hyperthyroidism. Plan to check ECG, CT abdomen pelvis given patient's abdominal complaints CBC CMP troponin TSH    ECG independently interpreted by me: Normal sinus rhythm right axis normal conduction with T wave inversions present impression abnormal ECG. CT abdomen pelvis reviewed report: [place] ventral abdominal hernia with protrusion transfer: 3 defect without evidence of obstruction. Mild phonatory changes descending colon which may represent low-grade colitis. Mild wall thickening urinary bladder may be concerning for cystitis correlate clinically. Labs reviewed: CBC remarkable for leukocytosis may be reactive CMP remarkable for DEBBY. Troponins were 28 and 231 with a delta of 203 likely reflective of recent DCCV performed in field. TSH within normal limits    Decision regarding hospitalization: Patient presents with abnormal heartbeat with evidence of elevated troponin which may be related to recent DCCV or primary cardiac process. Patient was advised and offered admission to hospital however he declined risk benefits discussed with patient patient demonstrate capacity patient will sign out [address] [facility]    Patient was counseled to follow-up with his PCP as outpatient and return to the ED immediately should his symptoms return or worsen anytime reconsider his decision for admission. Patient verbalized understanding of all instructions.         Critical Care Time  Procedures

## 2023-08-17 ENCOUNTER — TELEPHONE (OUTPATIENT)
Dept: FAMILY MEDICINE CLINIC | Facility: CLINIC | Age: 63
End: 2023-08-17

## 2023-08-17 NOTE — TELEPHONE ENCOUNTER
Pt's needs a new PCP. His daughter and grandson see you and he is asking if you would take him on as a patient.      Please advise

## 2023-08-23 NOTE — PROGRESS NOTES
Cardiology Follow Up    Nato Camacho  1960  [id number]  [facility]  [address]  [facility] [address] [phone]    No diagnosis found. Interval History:   Mr Mohsen Meza presesnted to [facility] ED on 8/06- 8/07/23 with SVT. He underwent DCCV 100J to NSR. TSH WNL.       Medical History   Primary Cardiologist Dr Stefan Harrison  Paroxysmal atrial fibrillation   Hypertension  Stress induced CM LVEF 25%  NSTEMI  Sp KERRIE to RCA in 2012  COPD on home oxygen  Pericarditis  Colitis  Perforation of bowel sp colostomy       Patient Active Problem List   Diagnosis   • Crohn disease ([address])   • Spinal stenosis of cervical region   • Pericarditis   • Coronary artery disease involving native coronary artery of native heart without angina pectoris   • Atherosclerosis of coronary artery   • Stage 4 very severe COPD by GOLD classification ([address])   • Dyslipidemia   • Alcohol abuse   • Cigarette nicotine dependence without complication   • Agitation   • Heart failure, systolic, due to idiopathic cardiomyopathy (HCC)   • Moderate protein-calorie malnutrition (HCC)   • Macrocytic anemia   • Hypocalcemia   • Perforation of colon (HCC)   • Hypotension   • Encephalopathy   • Presence of drug-eluting stent in right coronary artery   • Paroxysmal atrial fibrillation (HCC)   • Hypercalcemia   • Hematemesis with nausea   • Status post reversal of ileostomy   • Screening for diabetes mellitus   • Chronic respiratory failure with hypoxia (HCC)   • End stage COPD ([address])   • Prediabetes   • Abnormal liver function   • Multiple pulmonary nodules     Past Medical History:   Diagnosis Date   • Anxiety    • Asthma    • Cardiac disease    • Cervical stenosis (uterine cervix)    • Chest pain    • Chronic kidney disease    • Colitis    • Colon polyps    • COPD (chronic obstructive pulmonary disease) (HCC)     2L @ HS and PRN   • Coronary artery disease    • Diverticulitis    • Esophageal reflux    • 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HCC)    • Hyperlipidemia    • Hypertension    • IBD (inflammatory bowel disease)    • Myocardial infarction [facility])    • Old myocardial infarction    • Perforation of colon (HCC)    • Type 2 diabetes, diet controlled ([address])    • Ulcerative colitis ([address] [facility])      Social History     Socioeconomic History   • Marital status:      Spouse name: Not on file   • Number of children: Not on file   • Years of education: Not on file   • Highest education level: Not on file   Occupational History   • Not on file   Tobacco Use   • Smoking status: Every Day     Types: Cigars, Cigarettes   • Smokeless tobacco: Never   • Tobacco comments:     4-5cigars per day before nowadays smokes about 1 cigar, trying to cut down. Vaping Use   • Vaping Use: Never used   Substance and Sexual Activity   • Alcohol use: Not Currently   • Drug use: No   • Sexual activity: Yes     Partners: Female     Birth control/protection: None   Other Topics Concern   • Not on file   Social History Narrative   • Not on file     Social Determinants of Health     Financial Resource Strain: Not on file   Food Insecurity: No Food Insecurity (4/26/2023)    Hunger Vital Sign    • Worried About Running Out of Food in the Last Year: Never true    • Ran Out of Food in the Last Year: Never true   Transportation Needs: No Transportation Needs (4/26/2023)    PRAPARE - Transportation    • Lack of Transportation (Medical): No    • Lack of Transportation (Non-Medical):  No   Physical Activity: Not on file   Stress: Not on file   Social Connections: Not on file   Intimate Partner Violence: Not on file   Housing Stability: Unknown (4/26/2023)    Housing Stability Vital Sign    • Unable to Pay for Housing in the Last Year: No    • Number of Places Lived in the Last Year: Not on file    • Unstable Housing in the Last Year: No      Family History   Problem Relation Age of Onset   • Coronary artery disease Father • Crohn's disease Father    • Stroke Father    • Diabetes Family      Past Surgical History:   Procedure Laterality Date   • ANGIOPLASTY     • APPENDECTOMY     • COLON SURGERY     • COLONOSCOPY N/A 10/24/2016    Procedure: COLONOSCOPY;  Surgeon: Caitlin Dyson MD;  Location: [facility]; Service:    • CORONARY ANGIOPLASTY WITH STENT PLACEMENT     • ESOPHAGOGASTRODUODENOSCOPY N/A 10/24/2016    Procedure: ESOPHAGOGASTRODUODENOSCOPY (EGD); Surgeon: Caitlin Dyson MD;  Location: [facility]; Service:    • EXPLORATORY LAPAROTOMY W/ BOWEL RESECTION N/A 5/22/2018    Procedure: EXPLORATORY LAPAROTOMY, ILIOCOLECTOMY, ILIOCOLONIC ANASTAMOSIS, LOOP ILIOSTOMY, REPAIR OF SEROSAL TEAR, EXTENSIVE LYSIS OF ADHESIONS, WOUND VAC PLACEMENT;  Surgeon: Caitlin Dyson MD;  Location: [facility];  Service: Colorectal   • HEMICOLECTOMY     • ILEOSTOMY CLOSURE N/A 10/5/2018    Procedure: Kimberlytoby Adame;  Surgeon: Caitlin Dyson MD;  Location: [facility];  Service: Colorectal   • OTHER SURGICAL HISTORY      stent indications acute myocardial infarction   • [place] COLONOSCOPY FLX DX W/COLLJ SPEC WHEN PFRMD N/A 2/6/2018    Procedure: COLONOSCOPY;  Surgeon: Caitlin Dyson MD;  Location: [facility]; Service: Colorectal   • [place] COLONOSCOPY FLX DX W/COLLJ SPEC WHEN PFRMD N/A 10/4/2018    Procedure: COLONOSCOPY;  Surgeon: Caitlin Dyson MD;  Location: [facility];   Service: Colorectal   • TONSILLECTOMY         Current Outpatient Medications:   •  albuterol (2.5 mg/3 mL) 0.083 % nebulizer solution, Take 3 mL (2.5 mg total) by nebulization every 6 (six) hours as needed for wheezing or shortness of breath, Disp: 360 mL, Rfl: 3  •  albuterol (ProAir HFA) 90 mcg/act inhaler, Inhale 2 puffs every 6 (six) hours as needed for wheezing, Disp: 18 g, Rfl: 5  •  atorvastatin (LIPITOR) 40 mg tablet, TAKE 1 TABLET BY MOUTH DAILY WITH DINNER, Disp: 90 tablet, Rfl: 3  •  Budeson-Glycopyrrol-Formoterol (Breztri Aerosphere) 160-9-4.8 MCG/ACT AERO, Inhale 2 puffs 2 (two) times a day Rinse mouth after use., Disp: 10.7 g, Rfl: 3  •  levothyroxine 50 mcg tablet, Take 1 tablet (50 mcg total) by mouth daily in the early morning Do not start before May 7, 2023., Disp: 30 tablet, Rfl: 0  •  midodrine (PROAMATINE) 10 MG tablet, Take 1 tablet (10 mg total) by mouth 3 (three) times a day before meals, Disp: 270 tablet, Rfl: 0  •  Multiple Vitamins-Minerals (Centrum Silver 50+Men) TABS, Take by mouth, Disp: , Rfl:   •  Nebulizers (AERONEB GO NEBULIZER HANDSET) MISC, by Does not apply route 2 (two) times a day as needed (wheezing) (Patient not taking: Reported on 2/11/2020), Disp: 1 each, Rfl: 0  •  sulfaSALAzine (AZULFIDINE) 500 mg tablet, Take 1 tablet (500 mg total) by mouth 4 (four) times a day, Disp: 360 tablet, Rfl: 0  Allergies   Allergen Reactions   • Medical Tape    • Other      Brown cloth band aids      • Latex Rash       Labs:  Admission on 08/06/2023, Discharged on 08/07/2023   Component Date Value   • Ventricular Rate 08/06/2023 108    • Atrial Rate 08/06/2023 441    • QRSD Interval 08/06/2023 104    • QT Interval 08/06/2023 302    • QTC Interval 08/06/2023 404    • P Axis 08/06/2023 85    • QRS Axis 08/06/2023 92    • T Wave Axis 08/06/2023 84    • WBC 08/06/2023 12.06 (H)    • RBC 08/06/2023 4.84    • Hemoglobin 08/06/2023 13.5    • Hematocrit 08/06/2023 42.8    • MCV 08/06/2023 88    • MCH 08/06/2023 27.9    • MCHC 08/06/2023 31.5    • RDW 08/06/2023 15.9 (H)    • MPV 08/06/2023 10.1    • Platelets 44/50/3059 247    • nRBC 08/06/2023 0    • Neutrophils Relative 08/06/2023 73    • Immat GRANS % 08/06/2023 0    • Lymphocytes Relative 08/06/2023 16    • Monocytes Relative 08/06/2023 8    • Eosinophils Relative 08/06/2023 3    • Basophils Relative 08/06/2023 0    • Neutrophils Absolute 08/06/2023 8.80 (H)    • Immature Grans Absolute 08/06/2023 0.03    • Lymphocytes Absolute 08/06/2023 1.88    • Monocytes Absolute 08/06/2023 0.91    • Eosinophils Absolute 08/06/2023 0.39    • Basophils Absolute 08/06/2023 0.05    • Sodium 08/06/2023 141    • Potassium 08/06/2023 3.9    • Chloride 08/06/2023 115 (H)    • CO2 08/06/2023 24    • ANION GAP 08/06/2023 2    • BUN 08/06/2023 21    • Creatinine 08/06/2023 1.36 (H)    • Glucose 08/06/2023 110    • Calcium 08/06/2023 8.8    • Corrected Calcium 08/06/2023 9.4    • AST 08/06/2023 16    • ALT 08/06/2023 28    • Alkaline Phosphatase 08/06/2023 116    • Total Protein 08/06/2023 7.2    • Albumin 08/06/2023 3.2 (L)    • Total Bilirubin 08/06/2023 0.22    • eGFR 08/06/2023 55    • TSH 3RD GENERATON 08/06/2023 3.324    • hs TnI 0hr 08/06/2023 28    • hs TnI 2hr 08/07/2023 231 (H)    • Delta 2hr hsTnI 08/07/2023 203 (H)      Imaging: CT abdomen pelvis w contrast    Result Date: 8/7/2023  Narrative: CT ABDOMEN AND PELVIS WITH IV CONTRAST INDICATION:   Hernia, complicated Abdominal pain, acute, nonlocalized reported pulsatile mass abdomen, large hernia s/p colectomy. COMPARISON: Multiple priors most recently ultrasound 4/26/2023 TECHNIQUE:  CT examination of the abdomen and pelvis was performed. Multiplanar 2D reformatted images were created from the source data. This examination, like all CT scans performed in the [facility], was performed utilizing techniques to minimize radiation dose exposure, including the use of iterative reconstruction and automated exposure control. Radiation dose length product (DLP) for this visit:  279.73 mGy-cm IV Contrast:  85 mL of iohexol (OMNIPAQUE) Enteric Contrast:  Enteric contrast was not administered. FINDINGS: ABDOMEN LOWER CHEST:  Atelectatic changes are noted at the lung bases. LIVER/BILIARY TREE:  Liver is diffusely decreased in density consistent with fatty change. No CT evidence of suspicious hepatic mass. Normal hepatic contours. No biliary dilatation. GALLBLADDER:  There are gallstone(s) within the gallbladder, without pericholecystic inflammatory changes.  SPLEEN: Unremarkable. PANCREAS:  Unremarkable. ADRENAL GLANDS:  Unremarkable. KIDNEYS/URETERS: Small bilateral renal cysts and punctate nonobstructing bilateral renal calculi. No hydronephrosis. STOMACH AND BOWEL: Right hemicolectomy. Mild inflammatory change about the descending colon. Few scattered colonic diverticula APPENDIX:  No findings to suggest appendicitis. ABDOMINOPELVIC CAVITY:  No ascites. No pneumoperitoneum. No lymphadenopathy. VESSELS:  Atherosclerotic changes are present. No evidence of aneurysm. PELVIS REPRODUCTIVE ORGANS:  Unremarkable for patient's age. URINARY BLADDER: Mild wall thickening of the urinary bladder ABDOMINAL WALL/INGUINAL REGIONS: Widemouth ventral abdominal hernia with protrusion of the transverse colon through the defect without evidence of obstruction OSSEOUS STRUCTURES:  No acute fracture or destructive osseous lesion. Spinal degenerative changes are noted. Impression: [place]idemouth ventral abdominal hernia with protrusion of the transverse colon through the defect without evidence of obstruction Mild inflammatory change about the descending colon, which may represent low-grade colitis. Mild wall thickening of the urinary bladder is nonspecific. If there is concern for cystitis, correlate with urinalysis. Additional nonacute findings, as described. The study was marked in [facility] for immediate notification.  Workstation performed: [id number]       Review of Systems:  Review of Systems    Physical Exam:  Physical Exam    Discussion/Summary:***

## 2023-08-24 ENCOUNTER — OFFICE VISIT (OUTPATIENT)
Dept: CARDIOLOGY CLINIC | Facility: CLINIC | Age: 63
End: 2023-08-24

## 2023-08-24 VITALS
DIASTOLIC BLOOD PRESSURE: 60 MMHG | WEIGHT: 144.3 LBS | HEART RATE: 107 BPM | HEIGHT: 66 IN | BODY MASS INDEX: 23.19 KG/M2 | SYSTOLIC BLOOD PRESSURE: 108 MMHG

## 2023-08-24 DIAGNOSIS — J44.9 CHRONIC OBSTRUCTIVE PULMONARY DISEASE, UNSPECIFIED COPD TYPE (HCC): ICD-10-CM

## 2023-08-24 DIAGNOSIS — R00.0 TACHYCARDIA: Primary | ICD-10-CM

## 2023-08-24 DIAGNOSIS — I42.9 CARDIOMYOPATHY, UNSPECIFIED TYPE (HCC): ICD-10-CM

## 2023-08-24 DIAGNOSIS — R77.8 TROPONIN LEVEL ELEVATED: ICD-10-CM

## 2023-08-24 DIAGNOSIS — Z91.199 NON-COMPLIANCE: ICD-10-CM

## 2023-08-24 DIAGNOSIS — I25.10 CORONARY ARTERY DISEASE INVOLVING NATIVE CORONARY ARTERY OF NATIVE HEART WITHOUT ANGINA PECTORIS: ICD-10-CM

## 2023-08-24 RX ORDER — ATORVASTATIN CALCIUM 40 MG/1
40 TABLET, FILM COATED ORAL
Qty: 90 TABLET | Refills: 3 | Status: SHIPPED | OUTPATIENT
Start: 2023-08-24

## 2023-08-24 RX ORDER — ASPIRIN 81 MG/1
81 TABLET, CHEWABLE ORAL DAILY
Qty: 90 TABLET | Refills: 4 | Status: SHIPPED | OUTPATIENT
Start: 2023-08-24

## 2023-08-24 RX ORDER — METOPROLOL SUCCINATE 25 MG/1
25 TABLET, EXTENDED RELEASE ORAL
Qty: 90 TABLET | Refills: 4 | Status: SHIPPED | OUTPATIENT
Start: 2023-08-24

## 2023-08-24 NOTE — PROGRESS NOTES
Cardiology Consultation     Geno Mora  [id number]  1960  HEART [facility] VASC[facility] [place]  [address] [facility] [place] [address]    1. Tachycardia  POCT ECG    metoprolol succinate (TOPROL-XL) 25 mg 24 hr tablet      2. Troponin level elevated        3. Cardiomyopathy, unspecified type ([address])        4. Coronary artery disease involving native coronary artery of native heart without angina pectoris  atorvastatin (LIPITOR) 40 mg tablet    metoprolol succinate (TOPROL-XL) 25 mg 24 hr tablet    aspirin 81 mg chewable tablet      5. Chronic obstructive pulmonary disease, unspecified COPD type ([address])        6. Non-compliance          Mr Olga Duenas presesnted to [facility] ED on 8/06- 8/07/23 with SVT. He presented to the emergency room after acute onset SVT versus V. tach occurring at home. Cardioverted in the field was successful. Initial assessment revealed normalized blood pressure and heart rate. No airway compromise. EKG obtained confirmed normal sinus rhythm CT confirmed presence of hernia and possible colitis however patient is asymptomatic. No evidence of AAA or other acute thoracic process requiring emergent treatment. He was instructed to stay for 1 night for observation he decided to sign out AMA elevated troponins felt to be secondary to post SVT VT and cardioversion. Mild DEBBY observed he was treated with IV fluids. Mr Olga Duenas presents to our office for a recent ED follow up visit. He has not been seen in our office since 2018. Lawrence Burkett denies CP, palpitations, lightheadedness or dizziness. He is accompanied by his wife. Lawrence Burkett is not taking any of his medications listed on his medication list other than his inhalers. He has very poor hygiene. Lawrence Burkett admits to smoking small cigars 2-3 a day. He denies ETOH use.           Medical History   Primary Cardiologist Dr Igor Clark  Paroxysmal atrial fibrillation   Hypertension  Stress induced CM LVEF 25% improved   NSTEMI  Sp KERRIE to RCA in 2012  COPD on home oxygen  Pericarditis  Colitis  Perforation of bowel sp colostomy now reversed       Patient Active Problem List   Diagnosis   • Crohn disease ([address])   • Spinal stenosis of cervical region   • Pericarditis   • Coronary artery disease involving native coronary artery of native heart without angina pectoris   • Atherosclerosis of coronary artery   • Stage 4 very severe COPD by GOLD classification ([address])   • Dyslipidemia   • Alcohol abuse   • Cigarette nicotine dependence without complication   • Agitation   • Heart failure, systolic, due to idiopathic cardiomyopathy (HCC)   • Moderate protein-calorie malnutrition (HCC)   • Macrocytic anemia   • Hypocalcemia   • Perforation of colon (HCC)   • Hypotension   • Encephalopathy   • Presence of drug-eluting stent in right coronary artery   • Paroxysmal atrial fibrillation (HCC)   • Hypercalcemia   • Hematemesis with nausea   • Status post reversal of ileostomy   • Screening for diabetes mellitus   • Chronic respiratory failure with hypoxia (HCC)   • End stage COPD ([address])   • Prediabetes   • Abnormal liver function   • Multiple pulmonary nodules     Past Medical History:   Diagnosis Date   • Anxiety    • Asthma    • Cardiac disease    • Cervical stenosis (uterine cervix)    • Chest pain    • Chronic kidney disease    • Colitis    • Colon polyps    • COPD (chronic obstructive pulmonary disease) (HCC)     2L @ HS and PRN   • Coronary artery disease    • Diverticulitis    • Esophageal reflux    • 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HCC)    • Hyperlipidemia    • Hypertension    • IBD (inflammatory bowel disease)    • Myocardial infarction [facility])    • Old myocardial infarction    • Perforation of colon (HCC)    • Type 2 diabetes, diet controlled ([address])    • Ulcerative colitis ([address])      Social History     Socioeconomic History   • Marital status:       Spouse name: Not on file   • Number of children: Not on file   • Years of education: Not on file   • Highest education level: Not on file   Occupational History   • Not on file   Tobacco Use   • Smoking status: Every Day     Types: Cigars, Cigarettes   • Smokeless tobacco: Never   • Tobacco comments:     4-5cigars per day before nowadays smokes about 1 cigar, trying to cut down. Vaping Use   • Vaping Use: Never used   Substance and Sexual Activity   • Alcohol use: Not Currently   • Drug use: No   • Sexual activity: Yes     Partners: Female     Birth control/protection: None   Other Topics Concern   • Not on file   Social History Narrative   • Not on file     Social Determinants of Health     Financial Resource Strain: Not on file   Food Insecurity: No Food Insecurity (4/26/2023)    Hunger Vital Sign    • Worried About Running Out of Food in the Last Year: Never true    • Ran Out of Food in the Last Year: Never true   Transportation Needs: No Transportation Needs (4/26/2023)    PRAPARE - Transportation    • Lack of Transportation (Medical): No    • Lack of Transportation (Non-Medical): No   Physical Activity: Not on file   Stress: Not on file   Social Connections: Not on file   Intimate Partner Violence: Not on file   Housing Stability: Unknown (4/26/2023)    Housing Stability Vital Sign    • Unable to Pay for Housing in the Last Year: No    • Number of Places Lived in the Last Year: Not on file    • Unstable Housing in the Last Year: No      Family History   Problem Relation Age of Onset   • Coronary artery disease Father    • Crohn's disease Father    • Stroke Father    • Diabetes Family      Past Surgical History:   Procedure Laterality Date   • ANGIOPLASTY     • APPENDECTOMY     • COLON SURGERY     • COLONOSCOPY N/A 10/24/2016    Procedure: COLONOSCOPY;  Surgeon: James Adkins MD;  Location: [facility];   Service:    • CORONARY ANGIOPLASTY WITH STENT PLACEMENT     • ESOPHAGOGASTRODUODENOSCOPY N/A 10/24/2016    Procedure: ESOPHAGOGASTRODUODENOSCOPY (EGD); Surgeon: Tyree Moody MD;  Location: [facility]; Service:    • EXPLORATORY LAPAROTOMY W/ BOWEL RESECTION N/A 5/22/2018    Procedure: EXPLORATORY LAPAROTOMY, ILIOCOLECTOMY, ILIOCOLONIC ANASTAMOSIS, LOOP ILIOSTOMY, REPAIR OF SEROSAL TEAR, EXTENSIVE LYSIS OF ADHESIONS, WOUND VAC PLACEMENT;  Surgeon: Tyree Moody MD;  Location: [facility];  Service: Colorectal   • HEMICOLECTOMY     • ILEOSTOMY CLOSURE N/A 10/5/2018    Procedure: Gene Byers;  Surgeon: Tyree Moody MD;  Location: [facility];  Service: Colorectal   • OTHER SURGICAL HISTORY      stent indications acute myocardial infarction   • [place] COLONOSCOPY FLX DX W/COLLJ SPEC WHEN PFRMD N/A 2/6/2018    Procedure: COLONOSCOPY;  Surgeon: Tyree Moody MD;  Location: [facility]; Service: Colorectal   • [place] COLONOSCOPY FLX DX W/COLLJ SPEC WHEN PFRMD N/A 10/4/2018    Procedure: COLONOSCOPY;  Surgeon: Tyree Moody MD;  Location: [facility]; Service: Colorectal   • TONSILLECTOMY         Current Outpatient Medications:   •  albuterol (2.5 mg/3 mL) 0.083 % nebulizer solution, Take 3 mL (2.5 mg total) by nebulization every 6 (six) hours as needed for wheezing or shortness of breath, Disp: 360 mL, Rfl: 3  •  albuterol (ProAir HFA) 90 mcg/act inhaler, Inhale 2 puffs every 6 (six) hours as needed for wheezing, Disp: 18 g, Rfl: 5  •  Budeson-Glycopyrrol-Formoterol (Breztri Aerosphere) 160-9-4.8 MCG/ACT AERO, Inhale 2 puffs 2 (two) times a day Rinse mouth after use., Disp: 10.7 g, Rfl: 3  •  Nebulizers (AERONEB GO NEBULIZER HANDSET) MISC, by Does not apply route 2 (two) times a day as needed (wheezing), Disp: 1 each, Rfl: 0  •  atorvastatin (LIPITOR) 40 mg tablet, TAKE 1 TABLET BY MOUTH DAILY WITH DINNER (Patient not taking: Reported on 8/24/2023), Disp: 90 tablet, Rfl: 3  •  levothyroxine 50 mcg tablet, Take 1 tablet (50 mcg total) by mouth daily in the early morning Do not start before May 7, 2023.  (Patient not taking: Reported on 8/24/2023), Disp: 30 tablet, Rfl: 0  •  midodrine (PROAMATINE) 10 MG tablet, Take 1 tablet (10 mg total) by mouth 3 (three) times a day before meals (Patient not taking: Reported on 8/24/2023), Disp: 270 tablet, Rfl: 0  •  Multiple Vitamins-Minerals (Centrum Silver 50+Men) TABS, Take by mouth (Patient not taking: Reported on 8/24/2023), Disp: , Rfl:   •  sulfaSALAzine (AZULFIDINE) 500 mg tablet, Take 1 tablet (500 mg total) by mouth 4 (four) times a day (Patient not taking: Reported on 8/24/2023), Disp: 360 tablet, Rfl: 0  Allergies   Allergen Reactions   • Medical Tape    • Other      Brown cloth band aids      • Latex Rash     Vitals:    08/24/23 1002   BP: 108/60   BP Location: Left arm   Patient Position: Sitting   Cuff Size: Standard   Pulse: (!) 107   Weight: 65.5 kg (144 lb 4.8 oz)   Height: 5' 6" (1.676 m)       Labs:  Admission on 08/06/2023, Discharged on 08/07/2023   Component Date Value   • Ventricular Rate 08/06/2023 108    • Atrial Rate 08/06/2023 441    • QRSD Interval 08/06/2023 104    • QT Interval 08/06/2023 302    • QTC Interval 08/06/2023 404    • P Axis 08/06/2023 85    • QRS Axis 08/06/2023 92    • T Wave Axis 08/06/2023 84    • WBC 08/06/2023 12.06 (H)    • RBC 08/06/2023 4.84    • Hemoglobin 08/06/2023 13.5    • Hematocrit 08/06/2023 42.8    • MCV 08/06/2023 88    • MCH 08/06/2023 27.9    • MCHC 08/06/2023 31.5    • RDW 08/06/2023 15.9 (H)    • MPV 08/06/2023 10.1    • Platelets 74/07/4096 247    • nRBC 08/06/2023 0    • Neutrophils Relative 08/06/2023 73    • Immat GRANS % 08/06/2023 0    • Lymphocytes Relative 08/06/2023 16    • Monocytes Relative 08/06/2023 8    • Eosinophils Relative 08/06/2023 3    • Basophils Relative 08/06/2023 0    • Neutrophils Absolute 08/06/2023 8.80 (H)    • Immature Grans Absolute 08/06/2023 0.03    • Lymphocytes Absolute 08/06/2023 1.88    • Monocytes Absolute 08/06/2023 0.91    • Eosinophils Absolute 08/06/2023 0.39    • Basophils Absolute 08/06/2023 0.05    • Sodium 08/06/2023 141    • Potassium 08/06/2023 3.9    • Chloride 08/06/2023 115 (H)    • CO2 08/06/2023 24    • ANION GAP 08/06/2023 2    • BUN 08/06/2023 21    • Creatinine 08/06/2023 1.36 (H)    • Glucose 08/06/2023 110    • Calcium 08/06/2023 8.8    • Corrected Calcium 08/06/2023 9.4    • AST 08/06/2023 16    • ALT 08/06/2023 28    • Alkaline Phosphatase 08/06/2023 116    • Total Protein 08/06/2023 7.2    • Albumin 08/06/2023 3.2 (L)    • Total Bilirubin 08/06/2023 0.22    • eGFR 08/06/2023 55    • TSH 3RD GENERATON 08/06/2023 3.324    • hs TnI 0hr 08/06/2023 28    • hs TnI 2hr 08/07/2023 231 (H)    • Delta 2hr hsTnI 08/07/2023 203 (H)      Imaging: CT abdomen pelvis w contrast    Result Date: 8/7/2023  Narrative: CT ABDOMEN AND PELVIS WITH IV CONTRAST INDICATION:   Hernia, complicated Abdominal pain, acute, nonlocalized reported pulsatile mass abdomen, large hernia s/p colectomy. COMPARISON: Multiple priors most recently ultrasound 4/26/2023 TECHNIQUE:  CT examination of the abdomen and pelvis was performed. Multiplanar 2D reformatted images were created from the source data. This examination, like all CT scans performed in the [facility], was performed utilizing techniques to minimize radiation dose exposure, including the use of iterative reconstruction and automated exposure control. Radiation dose length product (DLP) for this visit:  279.73 mGy-cm IV Contrast:  85 mL of iohexol (OMNIPAQUE) Enteric Contrast:  Enteric contrast was not administered. FINDINGS: ABDOMEN LOWER CHEST:  Atelectatic changes are noted at the lung bases. LIVER/BILIARY TREE:  Liver is diffusely decreased in density consistent with fatty change. No CT evidence of suspicious hepatic mass. Normal hepatic contours. No biliary dilatation. GALLBLADDER:  There are gallstone(s) within the gallbladder, without pericholecystic inflammatory changes. SPLEEN:  Unremarkable. PANCREAS:  Unremarkable.  ADRENAL GLANDS:  Unremarkable. KIDNEYS/URETERS: Small bilateral renal cysts and punctate nonobstructing bilateral renal calculi. No hydronephrosis. STOMACH AND BOWEL: Right hemicolectomy. Mild inflammatory change about the descending colon. Few scattered colonic diverticula APPENDIX:  No findings to suggest appendicitis. ABDOMINOPELVIC CAVITY:  No ascites. No pneumoperitoneum. No lymphadenopathy. VESSELS:  Atherosclerotic changes are present. No evidence of aneurysm. PELVIS REPRODUCTIVE ORGANS:  Unremarkable for patient's age. URINARY BLADDER: Mild wall thickening of the urinary bladder ABDOMINAL WALL/INGUINAL REGIONS: Widemouth ventral abdominal hernia with protrusion of the transverse colon through the defect without evidence of obstruction OSSEOUS STRUCTURES:  No acute fracture or destructive osseous lesion. Spinal degenerative changes are noted. Impression: Widemouth ventral abdominal hernia with protrusion of the transverse colon through the defect without evidence of obstruction Mild inflammatory change about the descending colon, which may represent low-grade colitis. Mild wall thickening of the urinary bladder is nonspecific. If there is concern for cystitis, correlate with urinalysis. Additional nonacute findings, as described. The study was marked in [facility] for immediate notification. Workstation performed: [id number]       Review of Systems:  Review of Systems   All other systems reviewed and are negative. Physical Exam:  Physical Exam  Vitals reviewed. Constitutional:       Comments: Very poor hygiene    HENT:      Head: Normocephalic. Cardiovascular:      Rate and Rhythm: Regular rhythm. Tachycardia present. Pulses: Normal pulses. Heart sounds: Normal heart sounds. Pulmonary:      Effort: Pulmonary effort is normal.      Breath sounds: Rhonchi present. Musculoskeletal:         General: Normal range of motion. Cervical back: Normal range of motion. Right lower leg: No edema. Left lower leg: No edema. Skin:     General: Skin is warm and dry. Capillary Refill: Capillary refill takes less than 2 seconds. Neurological:      General: No focal deficit present. Mental Status: He is oriented to person, place, and time. Psychiatric:         Mood and Affect: Mood normal.         Behavior: Behavior normal.         Discussion/Summary:  # Sinus tachycardia, DCCV in field by EMS prior to ED. EKG  BPM, due to hx of CM, CAD and KERRIE start Metoprolol succinate 25mg daily at bedtime. # Elevation of Troponin in ED most likely due to DCCV, denies CP, hold off on ischemic evaluation at this time.   Patient has been on compliant with medication  # Hx of Stress induced CM LVEF 25% improved on TTE done 4/26/23 LVEF mildly reduced, low normal. RV mildly dilated   # CAD hx lf KERRIE to RCA in 2012, denies CP, instructed to restart ASA 81mg daily, Lipitor 40mg daily start metoprolol succinate 25mg daily at bedtime  # COPD   # Non compliance  Educated on importance of compliance with medications and follow up visit

## 2023-09-09 DIAGNOSIS — J44.9 STAGE 4 VERY SEVERE COPD BY GOLD CLASSIFICATION (HCC): ICD-10-CM

## 2023-09-10 RX ORDER — BUDESONIDE, GLYCOPYRROLATE, AND FORMOTEROL FUMARATE 160; 9; 4.8 UG/1; UG/1; UG/1
AEROSOL, METERED RESPIRATORY (INHALATION)
Qty: 10.7 G | Refills: 3 | Status: SHIPPED | OUTPATIENT
Start: 2023-09-10

## 2023-09-10 NOTE — PROGRESS NOTES
Spoke with pt while he was in the office today for his hospital f/u apt with PCP  I did arrange [employer] for transportation to and from appt  Pt has roller walker with him and did bring med list today  Pt reports his legs are very weak and could not get from car up to our office and wheelchair had to be used  I did f/u with [facility] I spoke with Patrick  to see when PT will be out to see pt  [facility] will be sending PT out this week to see pt  Pt is aware Sudha Bernard was stopped as insurance will not cover and is on [employer]  Pt's amiodarone was refilled and started on thiamine  Pt did state that he has not drank any alcohol in the past 4 months  Pt does live with his daughter and her 3 boys  Pt is on first floor of house as he is unable to do steps and bathroom is on 2nd floor  Pt does have a commode he is using  Pt reports has supplies for his ileostomy and daughter helps with changing it  Pt is scheduled with IR on Thursday 8/2 for SANTA drain removal  Pt reports has a friend that may take him but is not reliable  Pt did meet with our  Madai Wilson who helped him complete a [employer] application and pt will call tomorrow to see if he is approved for a 30 day period due to having MA  Pt asked to call either myself or Chuy Thomas regarding status  Pt is aware of upcoming cardiology appt  Pt did have CBC done at our lab today and 2 month f/u apt with Dr Juan Leo was made  I explained my role to pt, gave him my card with contact # [phone] and agreeable for further outreach  Resulted

## 2023-09-15 ENCOUNTER — OFFICE VISIT (OUTPATIENT)
Dept: FAMILY MEDICINE CLINIC | Facility: CLINIC | Age: 63
End: 2023-09-15
Payer: COMMERCIAL

## 2023-09-15 VITALS
OXYGEN SATURATION: 97 % | RESPIRATION RATE: 18 BRPM | DIASTOLIC BLOOD PRESSURE: 70 MMHG | BODY MASS INDEX: 24.11 KG/M2 | HEIGHT: 66 IN | SYSTOLIC BLOOD PRESSURE: 120 MMHG | HEART RATE: 117 BPM | WEIGHT: 150 LBS | TEMPERATURE: 97.2 F

## 2023-09-15 DIAGNOSIS — J44.9 STAGE 4 VERY SEVERE COPD BY GOLD CLASSIFICATION (HCC): ICD-10-CM

## 2023-09-15 DIAGNOSIS — F17.210 CIGARETTE NICOTINE DEPENDENCE WITHOUT COMPLICATION: ICD-10-CM

## 2023-09-15 DIAGNOSIS — N32.89 BLADDER WALL THICKENING: ICD-10-CM

## 2023-09-15 DIAGNOSIS — Z23 FLU VACCINE NEED: ICD-10-CM

## 2023-09-15 DIAGNOSIS — J44.9 END STAGE COPD (HCC): ICD-10-CM

## 2023-09-15 DIAGNOSIS — I25.10 CORONARY ARTERY DISEASE INVOLVING NATIVE CORONARY ARTERY OF NATIVE HEART WITHOUT ANGINA PECTORIS: ICD-10-CM

## 2023-09-15 DIAGNOSIS — E78.5 DYSLIPIDEMIA: ICD-10-CM

## 2023-09-15 DIAGNOSIS — K50.918 CROHN'S DISEASE WITH OTHER COMPLICATION, UNSPECIFIED GASTROINTESTINAL TRACT LOCATION (HCC): ICD-10-CM

## 2023-09-15 DIAGNOSIS — J96.11 CHRONIC RESPIRATORY FAILURE WITH HYPOXIA (HCC): ICD-10-CM

## 2023-09-15 DIAGNOSIS — Z00.00 ANNUAL PHYSICAL EXAM: Primary | ICD-10-CM

## 2023-09-15 PROBLEM — K92.0 HEMATEMESIS WITH NAUSEA: Status: RESOLVED | Noted: 2018-08-30 | Resolved: 2023-09-15

## 2023-09-15 PROBLEM — R45.1 AGITATION: Status: RESOLVED | Noted: 2018-05-19 | Resolved: 2023-09-15

## 2023-09-15 PROBLEM — I48.0 PAROXYSMAL ATRIAL FIBRILLATION (HCC): Status: RESOLVED | Noted: 2018-08-24 | Resolved: 2023-09-15

## 2023-09-15 PROBLEM — I42.9 HEART FAILURE, SYSTOLIC, DUE TO IDIOPATHIC CARDIOMYOPATHY (HCC): Status: RESOLVED | Noted: 2018-05-21 | Resolved: 2023-09-15

## 2023-09-15 PROBLEM — G93.40 ENCEPHALOPATHY: Status: RESOLVED | Noted: 2018-06-14 | Resolved: 2023-09-15

## 2023-09-15 PROBLEM — E83.52 HYPERCALCEMIA: Status: RESOLVED | Noted: 2018-08-30 | Resolved: 2023-09-15

## 2023-09-15 PROBLEM — I95.9 HYPOTENSION: Status: RESOLVED | Noted: 2018-06-13 | Resolved: 2023-09-15

## 2023-09-15 PROBLEM — I50.20 HEART FAILURE, SYSTOLIC, DUE TO IDIOPATHIC CARDIOMYOPATHY (HCC): Status: RESOLVED | Noted: 2018-05-21 | Resolved: 2023-09-15

## 2023-09-15 PROBLEM — Z13.1 SCREENING FOR DIABETES MELLITUS: Status: RESOLVED | Noted: 2019-08-09 | Resolved: 2023-09-15

## 2023-09-15 PROBLEM — E83.51 HYPOCALCEMIA: Status: RESOLVED | Noted: 2018-05-24 | Resolved: 2023-09-15

## 2023-09-15 PROCEDURE — 99386 PREV VISIT NEW AGE 40-64: CPT | Performed by: FAMILY MEDICINE

## 2023-09-15 PROCEDURE — 90471 IMMUNIZATION ADMIN: CPT

## 2023-09-15 PROCEDURE — 99204 OFFICE O/P NEW MOD 45 MIN: CPT | Performed by: FAMILY MEDICINE

## 2023-09-15 PROCEDURE — 90686 IIV4 VACC NO PRSV 0.5 ML IM: CPT

## 2023-09-15 NOTE — ASSESSMENT & PLAN NOTE
Denies hematuria or urinary symptoms  Found on CT scan abdomen and pelvis back in 8/2023  To repeat ultrasound

## 2023-09-15 NOTE — PROGRESS NOTES
[facility]    NAME: Keshav Salmeron  AGE: 66 y.o. SEX: male  : 1960     DATE: 9/15/2023     Assessment and Plan:     Problem List Items Addressed This Visit        Respiratory    Stage 4 very severe COPD by GOLD classification ([address])     On breztri   Sees pulmonary         Chronic respiratory failure with hypoxia ([address])     Sees pulmonary         End stage COPD ([address])     Encouraged to stop smoking            Cardiovascular and Mediastinum    Coronary artery disease involving native coronary artery of native heart without angina pectoris     On metoprolol and lipitor and asa            Genitourinary    Bladder wall thickening     Denies hematuria or urinary symptoms  Found on CT scan abdomen and pelvis back in 2023  To repeat ultrasound          Relevant Orders    US kidney and bladder       Other    Crohn disease ([address])    Relevant Orders    Ambulatory Referral to Gastroenterology    Dyslipidemia     lipitor as directed         Cigarette nicotine dependence without complication     Still smoking  Not ready to quit yet        Other Visit Diagnoses     Annual physical exam    -  Primary    Flu vaccine need        Relevant Orders    influenza vaccine, quadrivalent, 0.5 mL, preservative-free, for adult and pediatric patients 6 mos+ (AFLURIA, FLUARIX, FLULAVAL, FLUZONE)          Immunizations and preventive care screenings were discussed with patient today. Appropriate education was printed on patient's after visit summary. Discussed risks and benefits of prostate cancer screening. We discussed the controversial history of PSA screening for prostate cancer in the [facility] as well as the risk of over detection and over treatment of prostate cancer by way of PSA screening.   The patient understands that PSA blood testing is an imperfect way to screen for prostate cancer and that elevated PSA levels in the blood may also be caused by infection, inflammation, prostatic trauma or manipulation, urological procedures, or by benign prostatic enlargement. The role of the digital rectal examination in prostate cancer screening was also discussed and I discussed with him that there is large interobserver variability in the findings of digital rectal examination. Counseling:  Alcohol/drug use: discussed moderation in alcohol intake, the recommendations for healthy alcohol use, and avoidance of illicit drug use. Dental Health: discussed importance of regular tooth brushing, flossing, and dental visits. Injury prevention: discussed safety/seat belts, safety helmets, smoke detectors, carbon dioxide detectors, and smoking near bedding or upholstery. Sexual health: discussed sexually transmitted diseases, partner selection, use of condoms, avoidance of unintended pregnancy, and contraceptive alternatives. Exercise: the importance of regular exercise/physical activity was discussed. Recommend exercise 3-5 times per week for at least 30 minutes. No follow-ups on file. Chief Complaint:     Chief Complaint   Patient presents with   • New Patient Visit     Patient being seen for New Patient-Establish care      History of Present Illness:     Adult Annual Physical   Patient here for a comprehensive physical exam. The patient reports problems - new patient . Was in hospital for SVT in 8/2023  On metoprolol and no issues since then   Stopped drinking alcohol back in 4/2023 after encephalopathy admission at the hospital    Diet and Physical Activity  Diet/Nutrition: poor diet. Exercise: walking. Depression Screening  PHQ-2/9 Depression Screening    Little interest or pleasure in doing things: 0 - not at all  Feeling down, depressed, or hopeless: 0 - not at all  PHQ-2 Score: 0  PHQ-2 Interpretation: Negative depression screen       General Health  Sleep: sleeps well and gets 7-8 hours of sleep on average.    Hearing: normal - bilateral.  Vision: goes for regular eye exams. Dental: regular dental visits and brushes teeth twice daily.  Health  Symptoms include: none     Review of Systems:     Review of Systems   Constitutional: Negative. HENT: Negative. Eyes: Negative. Respiratory: Negative. Cardiovascular: Negative. Gastrointestinal: Negative. Endocrine: Negative. Genitourinary: Negative. Musculoskeletal: Negative. Skin: Negative. Allergic/Immunologic: Negative. Neurological: Negative. Hematological: Negative. Psychiatric/Behavioral: Negative. 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)    • Heart failure, systolic, due to idiopathic cardiomyopathy ([address]) 5/21/2018   • Hyperlipidemia    • Hypertension    • IBD (inflammatory bowel disease)    • Myocardial infarction [facility])    • Old myocardial infarction    • Paroxysmal atrial fibrillation ([address]) 8/24/2018   • Perforation of colon ([facility])    • Type 2 diabetes, diet controlled ([address])    • Ulcerative colitis ([address])       Past Surgical History:     Past Surgical History:   Procedure Laterality Date   • ANGIOPLASTY     • APPENDECTOMY     • COLON SURGERY     • COLONOSCOPY N/A 10/24/2016    Procedure: COLONOSCOPY;  Surgeon: Vaibhav Maciel MD;  Location: [facility]; Service:    • CORONARY ANGIOPLASTY WITH STENT PLACEMENT     • ESOPHAGOGASTRODUODENOSCOPY N/A 10/24/2016    Procedure: ESOPHAGOGASTRODUODENOSCOPY (EGD); Surgeon: Vaibhav Maciel MD;  Location: [facility];   Service:    • EXPLORATORY LAPAROTOMY W/ BOWEL RESECTION N/A 5/22/2018    Procedure: EXPLORATORY LAPAROTOMY, ILIOCOLECTOMY, ILIOCOLONIC ANASTAMOSIS, LOOP ILIOSTOMY, REPAIR OF SEROSAL TEAR, EXTENSIVE LYSIS OF ADHESIONS, WOUND VAC PLACEMENT;  Surgeon: Jennifer Marrero MD;  Location: [facility];  Service: Colorectal   • HEMICOLECTOMY     • ILEOSTOMY CLOSURE N/A 10/5/2018    Procedure: Arun Burton;  Surgeon: Jennifer Marrero MD;  [facility]: [facility];  Service: Colorectal   • OTHER SURGICAL HISTORY      stent indications acute myocardial infarction   • SC COLONOSCOPY FLX DX W/COLLJ SPEC WHEN PFRMD N/A 2/6/2018    Procedure: COLONOSCOPY;  Surgeon: Jennifer Marrero MD;  Location: [facility]; Service: Colorectal   • SC COLONOSCOPY FLX DX W/COLLJ SPEC WHEN PFRMD N/A 10/4/2018    Procedure: COLONOSCOPY;  Surgeon: Jennifer Marrero MD;  [facility]: [facility]; Service: Colorectal   • TONSILLECTOMY        Family History:     Family History   Problem Relation Age of Onset   • Coronary artery disease Father    • Crohn's disease Father    • Stroke Father    • Diabetes Family       Social History:     Social History     Socioeconomic History   • Marital status:      Spouse name: None   • Number of children: None   • Years of education: None   • Highest education level: None   Occupational History   • None   Tobacco Use   • Smoking status: Every Day     Types: Cigars   • Smokeless tobacco: Never   • Tobacco comments:     4-5cigars per day before nowadays smokes about 1 cigar, trying to cut down.    Vaping Use   • Vaping Use: Never used   Substance and Sexual Activity   • Alcohol use: Not Currently     Comment: last drink may 2023   • Drug use: No   • Sexual activity: Yes     Partners: Female     Birth control/protection: None   Other Topics Concern   • None   Social History Narrative   • None     Social Determinants of Health     Financial Resource Strain: Not on file   Food Insecurity: No Food Insecurity (4/26/2023)    Hunger Vital Sign    • Worried About Running Out of Food in the Last Year: Never true    • Ran Out of Food in the Last Year: Never true   Transportation Needs: No Transportation Needs (4/26/2023)    PRAPARE - Transportation    • Lack of Transportation (Medical): No    • Lack of Transportation (Non-Medical): No   Physical Activity: Not on file   Stress: Not on file   Social Connections: Not on file   Intimate Partner Violence: Not on file   Housing Stability: Unknown (4/26/2023)    Housing Stability Vital Sign    • Unable to Pay for Housing in the Last Year: No    • Number of Places Lived in the Last Year: Not on file    • Unstable Housing in the Last Year: No      Current Medications:     Current Outpatient Medications   Medication Sig Dispense Refill   • albuterol (2.5 mg/3 mL) 0.083 % nebulizer solution Take 3 mL (2.5 mg total) by nebulization every 6 (six) hours as needed for wheezing or shortness of breath 360 mL 3   • albuterol (ProAir HFA) 90 mcg/act inhaler Inhale 2 puffs every 6 (six) hours as needed for wheezing 18 g 5   • aspirin 81 mg chewable tablet Chew 1 tablet (81 mg total) daily 90 tablet 4   • atorvastatin (LIPITOR) 40 mg tablet Take 1 tablet (40 mg total) by mouth daily with dinner 90 tablet 3   • Breztri Aerosphere 160-9-4.8 MCG/ACT AERO INHALE 2 PUFFS BY MOUTH 2 TIMES A DAY RINSE MOUTH AFTER USE. 10.7 g 3   • metoprolol succinate (TOPROL-XL) 25 mg 24 hr tablet Take 1 tablet (25 mg total) by mouth daily at bedtime Daily at bedtime 90 tablet 4   • Multiple Vitamins-Minerals (Centrum Silver 50+Men) TABS Take by mouth     • Nebulizers (AERONEB GO NEBULIZER HANDSET) MISC by Does not apply route 2 (two) times a day as needed (wheezing) 1 each 0   • sulfaSALAzine (AZULFIDINE) 500 mg tablet Take 1 tablet (500 mg total) by mouth 4 (four) times a day 360 tablet 0   • levothyroxine 50 mcg tablet Take 1 tablet (50 mcg total) by mouth daily in the early morning Do not start before May 7, 2023.  (Patient not taking: Reported on 9/15/2023) 30 tablet 0   • midodrine (PROAMATINE) 10 MG tablet Take 1 tablet (10 mg total) by mouth 3 (three) times a day before meals (Patient not taking: Reported on 8/24/2023) 270 tablet 0     No current facility-administered medications for this visit. Allergies: Allergies   Allergen Reactions   • Medical Tape    • Other      Brown cloth band aids      • Latex Rash      Physical Exam:     /70 (BP Location: Left arm, Patient Position: Sitting, Cuff Size: Standard)   Pulse (!) 117   Temp (!) 97.2 °F (36.2 °C) (Tympanic)   Resp 18   Ht 5' 6" (1.676 m)   Wt 68 kg (150 lb)   SpO2 97%   BMI 24.21 kg/m²     Physical Exam  Constitutional:       Appearance: Normal appearance. He is well-developed. HENT:      Head: Normocephalic and atraumatic. Right Ear: Tympanic membrane normal.      Nose: Nose normal.      Mouth/Throat:      Mouth: Mucous membranes are moist.   Eyes:      Pupils: Pupils are equal, round, and reactive to light. Cardiovascular:      Rate and Rhythm: Normal rate and regular rhythm. Pulmonary:      Effort: Pulmonary effort is normal.      Breath sounds: Normal breath sounds. Abdominal:      Palpations: Abdomen is soft. Hernia: A hernia is present. Comments: ventral   Musculoskeletal:         General: Normal range of motion. Cervical back: Normal range of motion and neck supple. Skin:     General: Skin is warm. Capillary Refill: Capillary refill takes less than 2 seconds. Neurological:      Mental Status: He is alert and oriented to person, place, and time.    Psychiatric:         Mood and Affect: Mood normal.         Behavior: Behavior normal.          Howard Rusesll MD  [address] [place] [address]

## 2023-09-27 DIAGNOSIS — J44.9 STAGE 4 VERY SEVERE COPD BY GOLD CLASSIFICATION (HCC): ICD-10-CM

## 2023-09-27 RX ORDER — ALBUTEROL SULFATE 2.5 MG/3ML
2.5 SOLUTION RESPIRATORY (INHALATION) EVERY 6 HOURS PRN
Qty: 360 ML | Refills: 3 | Status: SHIPPED | OUTPATIENT
Start: 2023-09-27

## 2023-09-27 NOTE — TELEPHONE ENCOUNTER
Pt jos asking for a refill on his Albuterol neb sol to be sent to his [facility] in [facility].  Please advise

## 2023-12-08 ENCOUNTER — OFFICE VISIT (OUTPATIENT)
Dept: CARDIOLOGY CLINIC | Facility: CLINIC | Age: 63
End: 2023-12-08
Payer: COMMERCIAL

## 2023-12-08 VITALS
HEIGHT: 66 IN | WEIGHT: 170 LBS | HEART RATE: 88 BPM | SYSTOLIC BLOOD PRESSURE: 106 MMHG | DIASTOLIC BLOOD PRESSURE: 62 MMHG | BODY MASS INDEX: 27.32 KG/M2

## 2023-12-08 DIAGNOSIS — I47.10 SVT (SUPRAVENTRICULAR TACHYCARDIA): ICD-10-CM

## 2023-12-08 DIAGNOSIS — I25.10 CORONARY ARTERY DISEASE INVOLVING NATIVE CORONARY ARTERY OF NATIVE HEART WITHOUT ANGINA PECTORIS: Primary | ICD-10-CM

## 2023-12-08 DIAGNOSIS — I48.0 PAROXYSMAL ATRIAL FIBRILLATION (HCC): ICD-10-CM

## 2023-12-08 DIAGNOSIS — I47.20 VT (VENTRICULAR TACHYCARDIA) (HCC): ICD-10-CM

## 2023-12-08 DIAGNOSIS — J96.11 CHRONIC RESPIRATORY FAILURE WITH HYPOXIA (HCC): ICD-10-CM

## 2023-12-08 DIAGNOSIS — E78.5 DYSLIPIDEMIA: ICD-10-CM

## 2023-12-08 DIAGNOSIS — Z95.5 PRESENCE OF DRUG-ELUTING STENT IN RIGHT CORONARY ARTERY: ICD-10-CM

## 2023-12-08 PROCEDURE — 99214 OFFICE O/P EST MOD 30 MIN: CPT | Performed by: INTERNAL MEDICINE

## 2023-12-08 NOTE — PROGRESS NOTES
Cardiology Follow Up    Alfredo Jarrell  1960  [id number]  [address] [place] [address] [facility] [address] [address] [phone]    1. Coronary artery disease involving native coronary artery of native heart without angina pectoris  NM myocardial perfusion spect (rx stress and/or rest)      2. Dyslipidemia        3. Presence of drug-eluting stent in right coronary artery  NM myocardial perfusion spect (rx stress and/or rest)      4. VT (ventricular tachycardia) (HCC)  NM myocardial perfusion spect (rx stress and/or rest)    AMB extended holter monitor      5. SVT (supraventricular tachycardia)  Ambulatory Referral to Cardiology      6. Chronic respiratory failure with hypoxia (HCC)        7. Paroxysmal atrial fibrillation (HCC)            Discussion/Summary:    VT - I reviewed the telemetry EMS strips from August when he was taken to the ER. His ECG favors VT over SVT with aberrancy, based on his heart rate and QRS morphology. 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 Did require defibrillation and then was restarted on beta-blocker. Unfortunately he left AGAINST MEDICAL ADVICE before continuing his workup. I ordered an updated ischemic evaluation with a stress nuclear study and an extended ambulatory Holter. Then after that we will determine consultation with EP if needed. Paroxysmal atrial fibrillation - This occur during an acute hospitalization for colitis requiring bowel surgery and acute respiratory failure back in 2018. He returned to sinus rhythm was placed on amiodarone. At our last follow-up I did stop amiodarone. Since this occurred in the acuity of his colitis, he will continue on aspirin for stroke prevention and a low CHADS-VASc score of 1. He was started back on metoprolol given his VT.      Coronary artery disease - He had a prior history of a myocardial infarction in 2012. He had a drug-eluting stent to the RCA. He is without symptoms of angina. He will remain on the same medical therapy. No changes were made. Dyslipidemia - He will remain on the same dose of atorvastatin. We will continue to follow blood work periodically. Interval History:     Mr. Jules Garrett comes in for follow-up given his history of coronary artery disease and paroxysmal atrial fibrillation. He has a history of a myocardial infarct in 2012 in which his received a stent to the RCA. Last year he presented with a pleuritic chest pain made worse with inspiration, however there were ST elevations and therefore he was taken to the cardiac cath lab. There he had no changes in his RCA stent was open. Given his classic pleuritic type symptoms, he was then treated for pericarditis. We increased his aspirin and added colchicine and he responded well. Then earlier this year Mr. Jules Garrett was in the hospital with colitis and perforated bowel. He required surgery and a colostomy. In the setting of this he went into atrial fibrillation. He did convert and was placed on amiodarone. Anticoagulation was not started. He has remained in sinus rhythm since. The next month he had reversal of his ostomy. During his initial hospitalization he appeared to have a stress-induced cardiomyopathy, with an ejection fraction dropping down to 25%. However a month later, repeat echocardiogram showed his ejection fraction come back to his baseline in the low-normal range. Prior to her last follow-up he was in the hospital with hypotension, and his lisinopril metoprolol were stopped. At our last follow-up I did have him stop the amiodarone given the long-term side effects and his chronic lung disease. I have not seen Rk Wu in about 5 years. In August Rk Wu was in the hospital with was reported as SVT, but ECG by EMS suggests VT.   He was shocked by EMS in the field, as his heart rate was around 240 and he was reported as hypotensive. When he arrived in the ER he was in sinus rhythm/sinus tachycardia. His second troponin elevated and it was recommended that he gets admitted. However he did not wish to do this and left AGAINST MEDICAL ADVICE. He was seen in our office later that month. He was started back on low-dose metoprolol. He was admitted to the hospital back in May with encephalopathy and worsening respiratory failure. It was also noted that he had been abusing alcohol daily. It appeared that he was septic, had alcoholic ketoacidosis and then alcohol withdrawal.  He had an echocardiogram during that hospitalization that did not show any change. He has a low normal ejection fraction with with inferior regional wall motion abnormalities. When Ward Crowley had this episode his symptoms included lightheadedness and palpitations. He was able to see his pulse coming out of his chest and abdomen. He did not have any syncope. He has been asymptomatic from this standpoint since. He does have chronic shortness of breath with exertion given his severe COPD. He does require intermittent home oxygen. He denies any chest pain or symptoms of angina. No other signs or symptoms of CHF.       Patient Active Problem List   Diagnosis    Crohn disease ([address])    Spinal stenosis of cervical region    Pericarditis    Coronary artery disease involving native coronary artery of native heart without angina pectoris    Atherosclerosis of coronary artery    Stage 4 very severe COPD by GOLD classification ([address])    Dyslipidemia    Alcohol abuse    Cigarette nicotine dependence without complication    Moderate protein-calorie malnutrition (HCC)    Macrocytic anemia    Presence of drug-eluting stent in right coronary artery    Paroxysmal atrial fibrillation ([address])    Status post reversal of ileostomy    Chronic respiratory failure with hypoxia (HCC)    End stage COPD ([address])    Prediabetes Abnormal liver function    Multiple pulmonary nodules    Bladder wall thickening    VT (ventricular tachycardia) (HCC)     Past Medical History:   Diagnosis Date    Anxiety     Asthma     Cardiac disease     Cervical stenosis (uterine cervix)     Chest pain     Chronic kidney disease     Colitis     Colon polyps     COPD (chronic obstructive pulmonary disease) (HCC)     2L @ HS and PRN    Coronary artery disease     Diverticulitis     Esophageal reflux     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HCC)     Heart failure, systolic, due to idiopathic cardiomyopathy ([address]) 5/21/2018    Hyperlipidemia     Hypertension     IBD (inflammatory bowel disease)     Myocardial infarction [facility])     Old myocardial infarction     Paroxysmal atrial fibrillation ([address]) 8/24/2018    Perforation of colon (HCC)     Type 2 diabetes, diet controlled ([address])     Ulcerative colitis ([address])      Social History     Socioeconomic History    Marital status:      Spouse name: Not on file    Number of children: Not on file    Years of education: Not on file    Highest education level: Not on file   Occupational History    Not on file   Tobacco Use    Smoking status: Every Day     Types: Cigars    Smokeless tobacco: Never    Tobacco comments:     4-5cigars per day before nowadays smokes about 1 cigar, trying to cut down.    Vaping Use    Vaping Use: Never used   Substance and Sexual Activity    Alcohol use: Not Currently     Comment: last drink may 2023    Drug use: No    Sexual activity: Yes     Partners: Female     Birth control/protection: None   Other Topics Concern    Not on file   Social History Narrative    Not on file     Social Determinants of Health     Financial Resource Strain: Not on file   Food Insecurity: No Food Insecurity (4/26/2023)    Hunger Vital Sign     Worried About Running Out of Food in the Last Year: Never true     Ran Out of Food in the Last Year: Never true   Transportation Needs: No Transportation Needs (4/26/2023)    PRAPARE - Transportation     Lack of Transportation (Medical): No     Lack of Transportation (Non-Medical): No   Physical Activity: Not on file   Stress: Not on file   Social Connections: Not on file   Intimate Partner Violence: Not on file   Housing Stability: Unknown (4/26/2023)    Housing Stability Vital Sign     Unable to Pay for Housing in the Last Year: No     Number of Places Lived in the Last Year: Not on file     Unstable Housing in the Last Year: No      Family History   Problem Relation Age of Onset    Coronary artery disease Father     Crohn's disease Father     Stroke Father     Diabetes Family      Past Surgical History:   Procedure Laterality Date    ANGIOPLASTY      APPENDECTOMY      COLON SURGERY      COLONOSCOPY N/A 10/24/2016    Procedure: COLONOSCOPY;  Surgeon: Onelia Leiva MD;  Location: [facility]; Service:     CORONARY ANGIOPLASTY WITH STENT PLACEMENT      ESOPHAGOGASTRODUODENOSCOPY N/A 10/24/2016    Procedure: ESOPHAGOGASTRODUODENOSCOPY (EGD); Surgeon: Onelia Leiva MD;  Location: [facility]; Service:     EXPLORATORY LAPAROTOMY W/ BOWEL RESECTION N/A 5/22/2018    Procedure: EXPLORATORY LAPAROTOMY, ILIOCOLECTOMY, ILIOCOLONIC ANASTAMOSIS, LOOP ILIOSTOMY, REPAIR OF SEROSAL TEAR, EXTENSIVE LYSIS OF ADHESIONS, WOUND VAC PLACEMENT;  Surgeon: Onelia Leiva MD;  Location: [facility];  Service: Colorectal    HEMICOLECTOMY      ILEOSTOMY CLOSURE N/A 10/5/2018    Procedure: Mliy Cease;  Surgeon: Onelia Leiva MD;  Location: [facility];  Service: Colorectal    OTHER SURGICAL HISTORY      stent indications acute myocardial infarction    KS COLONOSCOPY FLX DX W/COLLJ SPEC WHEN PFRMD N/A 2/6/2018    Procedure: COLONOSCOPY;  Surgeon: Onelai Leiva MD;  Location: [facility]; Service: Colorectal    KS COLONOSCOPY FLX DX W/COLLJ SPEC WHEN PFRMD N/A 10/4/2018    Procedure: COLONOSCOPY;  Surgeon: Onelia Leiva MD;  Location: [facility];   Service: Colorectal    TONSILLECTOMY Current Outpatient Medications:     albuterol (2.5 mg/3 mL) 0.083 % nebulizer solution, Take 3 mL (2.5 mg total) by nebulization every 6 (six) hours as needed for wheezing or shortness of breath, Disp: 360 mL, Rfl: 3    albuterol (PROVENTIL HFA,VENTOLIN HFA) 90 mcg/act inhaler, INHALE 2 PUFFS EVERY 6 HOURS AS NEEDED FOR WHEEZING, Disp: 18 g, Rfl: 5    aspirin 81 mg chewable tablet, Chew 1 tablet (81 mg total) daily, Disp: 90 tablet, Rfl: 4    atorvastatin (LIPITOR) 40 mg tablet, Take 1 tablet (40 mg total) by mouth daily with dinner, Disp: 90 tablet, Rfl: 3    Breztri Aerosphere 160-9-4.8 MCG/ACT AERO, INHALE 2 PUFFS BY MOUTH 2 TIMES A DAY RINSE MOUTH AFTER USE., Disp: 10.7 g, Rfl: 3    metoprolol succinate (TOPROL-XL) 25 mg 24 hr tablet, Take 1 tablet (25 mg total) by mouth daily at bedtime Daily at bedtime, Disp: 90 tablet, Rfl: 4    Multiple Vitamins-Minerals (Centrum Silver 50+Men) TABS, Take by mouth, Disp: , Rfl:     Nebulizers (AERONEB GO NEBULIZER HANDSET) MISC, by Does not apply route 2 (two) times a day as needed (wheezing), Disp: 1 each, Rfl: 0    levothyroxine 50 mcg tablet, Take 1 tablet (50 mcg total) by mouth daily in the early morning Do not start before May 7, 2023.  (Patient not taking: Reported on 9/15/2023), Disp: 30 tablet, Rfl: 0    midodrine (PROAMATINE) 10 MG tablet, Take 1 tablet (10 mg total) by mouth 3 (three) times a day before meals (Patient not taking: Reported on 8/24/2023), Disp: 270 tablet, Rfl: 0    sulfaSALAzine (AZULFIDINE) 500 mg tablet, Take 1 tablet (500 mg total) by mouth 4 (four) times a day, Disp: 360 tablet, Rfl: 0  Allergies   Allergen Reactions    Medical Tape     Other      Brown cloth band aids       Latex Rash       Labs:  Lab Results   Component Value Date     05/29/2015    K 3.9 08/06/2023    K 4.4 05/29/2015     (H) 08/06/2023     05/29/2015    CO2 24 08/06/2023    CO2 21 07/08/2018    BUN 21 08/06/2023    BUN 10 05/29/2015    CREATININE 1.36 (H) 08/06/2023    CREATININE 0.87 05/29/2015    GLUCOSE 92 07/08/2018    GLUCOSE 122 05/29/2015    CALCIUM 8.8 08/06/2023    CALCIUM 9.1 05/29/2015     Lab Results   Component Value Date    WBC 12.06 (H) 08/06/2023    WBC 9.06 05/28/2015    HGB 13.5 08/06/2023    HGB 15.2 05/28/2015    HCT 42.8 08/06/2023    HCT 45.9 05/28/2015    MCV 88 08/06/2023    MCV 95 05/28/2015     08/06/2023     05/28/2015     Lab Results   Component Value Date    TRIG 171 (H) 05/20/2018    HDL 67 (H) 05/20/2018     Imaging:     ECHO(6/2018):  PROCEDURE INFORMATION:  This was a technically difficult study. LEFT VENTRICLE:  Systolic function was at the lower limits of normal. Ejection fraction was estimated to be 50 %. There was akinesis of the basal inferoseptal and basal inferior wall(s). There was no evidence of concentric hypertrophy. RIGHT VENTRICLE:  The ventricle was mildly dilated. Systolic function was mildly to moderately reduced. MITRAL VALVE:  There was mild regurgitation. AORTIC VALVE:  There was very mild stenosis. There was trace regurgitation. TRICUSPID VALVE:  There was mild regurgitation. PULMONIC VALVE:  There was trace regurgitation. Review of Systems:  Review of Systems   Constitutional:  Positive for fatigue. HENT: Negative. Eyes: Negative. Respiratory:  Positive for shortness of breath. Cardiovascular: Negative. Gastrointestinal: Negative. Musculoskeletal: Negative. Skin: Negative. Allergic/Immunologic: Negative. Neurological: Negative. Hematological: Negative. Psychiatric/Behavioral: Negative. All other systems reviewed and are negative. Physical Exam:  Physical Exam  Vitals and nursing note reviewed. Constitutional:       Appearance: He is well-developed. HENT:      Head: Normocephalic and atraumatic. Eyes:      General: No scleral icterus. Right eye: No discharge. Left eye: No discharge.       Pupils: Pupils are equal, round, and reactive to light. Neck:      Thyroid: No thyromegaly. Vascular: No JVD. Cardiovascular:      Rate and Rhythm: Normal rate and regular rhythm. No extrasystoles are present. Pulses: Normal pulses. Heart sounds: Normal heart sounds, S1 normal and S2 normal. No murmur heard. No friction rub. No gallop. Pulmonary:      Effort: Pulmonary effort is normal. No respiratory distress. Breath sounds: Decreased breath sounds present. No wheezing or rales. Abdominal:      General: Bowel sounds are normal. There is no distension. Palpations: Abdomen is soft. Tenderness: There is no abdominal tenderness. Musculoskeletal:         General: No tenderness or deformity. Normal range of motion. Cervical back: Normal range of motion and neck supple. Skin:     General: Skin is warm and dry. Findings: No rash. Neurological:      Mental Status: He is alert and oriented to person, place, and time. Cranial Nerves: No cranial nerve deficit. Psychiatric:         Thought Content: Thought content normal.         Judgment: Judgment normal.       Counseling / Coordination of Care  Total office / unit time spent today 25 minutes. Greater than 50% of total time was spent with the patient and / or family counseling and / or coordination of care.

## 2023-12-20 DIAGNOSIS — J44.9 STAGE 4 VERY SEVERE COPD BY GOLD CLASSIFICATION (HCC): ICD-10-CM

## 2023-12-20 RX ORDER — BUDESONIDE, GLYCOPYRROLATE, AND FORMOTEROL FUMARATE 160; 9; 4.8 UG/1; UG/1; UG/1
2 AEROSOL, METERED RESPIRATORY (INHALATION) 2 TIMES DAILY
Qty: 32.1 G | Refills: 3 | Status: SHIPPED | OUTPATIENT
Start: 2023-12-20

## 2024-01-29 DIAGNOSIS — J44.9 STAGE 4 VERY SEVERE COPD BY GOLD CLASSIFICATION (HCC): ICD-10-CM

## 2024-01-29 RX ORDER — ALBUTEROL SULFATE 2.5 MG/3ML
SOLUTION RESPIRATORY (INHALATION)
Qty: 375 ML | Refills: 1 | Status: SHIPPED | OUTPATIENT
Start: 2024-01-29

## 2024-02-03 ENCOUNTER — HOSPITAL ENCOUNTER (INPATIENT)
Facility: HOSPITAL | Age: 64
LOS: 6 days | Discharge: HOME | End: 2024-02-09
Attending: EMERGENCY MEDICINE | Admitting: HOSPITALIST
Payer: COMMERCIAL

## 2024-02-03 ENCOUNTER — APPOINTMENT (EMERGENCY)
Dept: RADIOLOGY | Facility: HOSPITAL | Age: 64
End: 2024-02-03
Payer: COMMERCIAL

## 2024-02-03 DIAGNOSIS — K92.2 GI BLEEDING: ICD-10-CM

## 2024-02-03 DIAGNOSIS — U07.1 COVID-19: ICD-10-CM

## 2024-02-03 DIAGNOSIS — J44.1 ACUTE EXACERBATION OF CHRONIC OBSTRUCTIVE PULMONARY DISEASE (COPD) (HCC): ICD-10-CM

## 2024-02-03 DIAGNOSIS — J44.1 COPD EXACERBATION (HCC): ICD-10-CM

## 2024-02-03 DIAGNOSIS — R05.9 COUGH: ICD-10-CM

## 2024-02-03 DIAGNOSIS — F17.210 CIGARETTE NICOTINE DEPENDENCE WITHOUT COMPLICATION: ICD-10-CM

## 2024-02-03 DIAGNOSIS — J96.01 ACUTE RESPIRATORY FAILURE WITH HYPOXIA (HCC): Primary | ICD-10-CM

## 2024-02-03 DIAGNOSIS — U07.1 COVID-19 VIRUS INFECTION: ICD-10-CM

## 2024-02-03 PROBLEM — J96.21 ACUTE ON CHRONIC RESPIRATORY FAILURE WITH HYPOXIA (HCC): Status: ACTIVE | Noted: 2023-04-25

## 2024-02-03 LAB
2HR DELTA HS TROPONIN: 0 NG/L
4HR DELTA HS TROPONIN: 0 NG/L
ALBUMIN SERPL BCP-MCNC: 3.8 G/DL (ref 3.5–5)
ALP SERPL-CCNC: 110 U/L (ref 34–104)
ALT SERPL W P-5'-P-CCNC: 36 U/L (ref 7–52)
ANION GAP SERPL CALCULATED.3IONS-SCNC: 5 MMOL/L
AST SERPL W P-5'-P-CCNC: 24 U/L (ref 13–39)
BASE EX.OXY STD BLDV CALC-SCNC: 81.3 % (ref 60–80)
BASE EXCESS BLDV CALC-SCNC: 0.3 MMOL/L
BASOPHILS # BLD AUTO: 0.04 THOUSANDS/ÂΜL (ref 0–0.1)
BASOPHILS NFR BLD AUTO: 0 % (ref 0–1)
BILIRUB SERPL-MCNC: 0.77 MG/DL (ref 0.2–1)
BNP SERPL-MCNC: 133 PG/ML (ref 0–100)
BUN SERPL-MCNC: 18 MG/DL (ref 5–25)
CALCIUM SERPL-MCNC: 8.6 MG/DL (ref 8.4–10.2)
CARDIAC TROPONIN I PNL SERPL HS: 25 NG/L
CHLORIDE SERPL-SCNC: 105 MMOL/L (ref 96–108)
CK SERPL-CCNC: 825 U/L (ref 39–308)
CO2 SERPL-SCNC: 27 MMOL/L (ref 21–32)
CREAT SERPL-MCNC: 1.33 MG/DL (ref 0.6–1.3)
CRP SERPL QL: 105.5 MG/L
D DIMER PPP FEU-MCNC: 0.52 UG/ML FEU
EOSINOPHIL # BLD AUTO: 0.01 THOUSAND/ÂΜL (ref 0–0.61)
EOSINOPHIL NFR BLD AUTO: 0 % (ref 0–6)
ERYTHROCYTE [DISTWIDTH] IN BLOOD BY AUTOMATED COUNT: 14.7 % (ref 11.6–15.1)
FLUAV RNA RESP QL NAA+PROBE: NEGATIVE
FLUBV RNA RESP QL NAA+PROBE: NEGATIVE
GFR SERPL CREATININE-BSD FRML MDRD: 57 ML/MIN/1.73SQ M
GLUCOSE SERPL-MCNC: 118 MG/DL (ref 65–140)
HCO3 BLDV-SCNC: 25.4 MMOL/L (ref 24–30)
HCT VFR BLD AUTO: 45.9 % (ref 36.5–49.3)
HGB BLD-MCNC: 15 G/DL (ref 12–17)
IMM GRANULOCYTES # BLD AUTO: 0.06 THOUSAND/UL (ref 0–0.2)
IMM GRANULOCYTES NFR BLD AUTO: 1 % (ref 0–2)
LYMPHOCYTES # BLD AUTO: 1.56 THOUSANDS/ÂΜL (ref 0.6–4.47)
LYMPHOCYTES NFR BLD AUTO: 13 % (ref 14–44)
MCH RBC QN AUTO: 29.9 PG (ref 26.8–34.3)
MCHC RBC AUTO-ENTMCNC: 32.7 G/DL (ref 31.4–37.4)
MCV RBC AUTO: 92 FL (ref 82–98)
MONOCYTES # BLD AUTO: 1.39 THOUSAND/ÂΜL (ref 0.17–1.22)
MONOCYTES NFR BLD AUTO: 12 % (ref 4–12)
NEUTROPHILS # BLD AUTO: 8.56 THOUSANDS/ÂΜL (ref 1.85–7.62)
NEUTS SEG NFR BLD AUTO: 74 % (ref 43–75)
NRBC BLD AUTO-RTO: 0 /100 WBCS
O2 CT BLDV-SCNC: 17.4 ML/DL
PCO2 BLDV: 42.7 MM HG (ref 42–50)
PH BLDV: 7.39 [PH] (ref 7.3–7.4)
PLATELET # BLD AUTO: 195 THOUSANDS/UL (ref 149–390)
PMV BLD AUTO: 9.9 FL (ref 8.9–12.7)
PO2 BLDV: 58.8 MM HG (ref 35–45)
POTASSIUM SERPL-SCNC: 4.7 MMOL/L (ref 3.5–5.3)
PROCALCITONIN SERPL-MCNC: 0.12 NG/ML
PROT SERPL-MCNC: 7.7 G/DL (ref 6.4–8.4)
RBC # BLD AUTO: 5.01 MILLION/UL (ref 3.88–5.62)
RSV RNA RESP QL NAA+PROBE: NEGATIVE
SARS-COV-2 RNA RESP QL NAA+PROBE: POSITIVE
SODIUM SERPL-SCNC: 137 MMOL/L (ref 135–147)
WBC # BLD AUTO: 11.62 THOUSAND/UL (ref 4.31–10.16)

## 2024-02-03 PROCEDURE — 84484 ASSAY OF TROPONIN QUANT: CPT | Performed by: HOSPITALIST

## 2024-02-03 PROCEDURE — XW033E5 INTRODUCTION OF REMDESIVIR ANTI-INFECTIVE INTO PERIPHERAL VEIN, PERCUTANEOUS APPROACH, NEW TECHNOLOGY GROUP 5: ICD-10-PCS | Performed by: HOSPITALIST

## 2024-02-03 PROCEDURE — 85379 FIBRIN DEGRADATION QUANT: CPT | Performed by: HOSPITALIST

## 2024-02-03 PROCEDURE — 0241U HB NFCT DS VIR RESP RNA 4 TRGT: CPT | Performed by: EMERGENCY MEDICINE

## 2024-02-03 PROCEDURE — 96365 THER/PROPH/DIAG IV INF INIT: CPT

## 2024-02-03 PROCEDURE — 80053 COMPREHEN METABOLIC PANEL: CPT

## 2024-02-03 PROCEDURE — 82550 ASSAY OF CK (CPK): CPT | Performed by: HOSPITALIST

## 2024-02-03 PROCEDURE — 84484 ASSAY OF TROPONIN QUANT: CPT

## 2024-02-03 PROCEDURE — 94640 AIRWAY INHALATION TREATMENT: CPT

## 2024-02-03 PROCEDURE — 94644 CONT INHLJ TX 1ST HOUR: CPT

## 2024-02-03 PROCEDURE — 84145 PROCALCITONIN (PCT): CPT | Performed by: HOSPITALIST

## 2024-02-03 PROCEDURE — 83880 ASSAY OF NATRIURETIC PEPTIDE: CPT | Performed by: HOSPITALIST

## 2024-02-03 PROCEDURE — 96367 TX/PROPH/DG ADDL SEQ IV INF: CPT

## 2024-02-03 PROCEDURE — 85025 COMPLETE CBC W/AUTO DIFF WBC: CPT

## 2024-02-03 PROCEDURE — 86140 C-REACTIVE PROTEIN: CPT | Performed by: HOSPITALIST

## 2024-02-03 PROCEDURE — 99291 CRITICAL CARE FIRST HOUR: CPT | Performed by: EMERGENCY MEDICINE

## 2024-02-03 PROCEDURE — 36415 COLL VENOUS BLD VENIPUNCTURE: CPT

## 2024-02-03 PROCEDURE — 82805 BLOOD GASES W/O2 SATURATION: CPT

## 2024-02-03 PROCEDURE — 71045 X-RAY EXAM CHEST 1 VIEW: CPT

## 2024-02-03 PROCEDURE — 99285 EMERGENCY DEPT VISIT HI MDM: CPT

## 2024-02-03 PROCEDURE — 93005 ELECTROCARDIOGRAM TRACING: CPT

## 2024-02-03 PROCEDURE — 99223 1ST HOSP IP/OBS HIGH 75: CPT | Performed by: HOSPITALIST

## 2024-02-03 PROCEDURE — 94760 N-INVAS EAR/PLS OXIMETRY 1: CPT

## 2024-02-03 RX ORDER — SODIUM CHLORIDE FOR INHALATION 0.9 %
12 VIAL, NEBULIZER (ML) INHALATION ONCE
Status: COMPLETED | OUTPATIENT
Start: 2024-02-03 | End: 2024-02-03

## 2024-02-03 RX ORDER — GUAIFENESIN 600 MG/1
600 TABLET, EXTENDED RELEASE ORAL 2 TIMES DAILY
Status: DISCONTINUED | OUTPATIENT
Start: 2024-02-03 | End: 2024-02-09 | Stop reason: HOSPADM

## 2024-02-03 RX ORDER — ATORVASTATIN CALCIUM 40 MG/1
40 TABLET, FILM COATED ORAL
Status: DISCONTINUED | OUTPATIENT
Start: 2024-02-03 | End: 2024-02-09 | Stop reason: HOSPADM

## 2024-02-03 RX ORDER — LEVALBUTEROL INHALATION SOLUTION 1.25 MG/3ML
1.25 SOLUTION RESPIRATORY (INHALATION) EVERY 6 HOURS PRN
Status: DISCONTINUED | OUTPATIENT
Start: 2024-02-03 | End: 2024-02-04

## 2024-02-03 RX ORDER — LEVALBUTEROL INHALATION SOLUTION 1.25 MG/3ML
1.25 SOLUTION RESPIRATORY (INHALATION)
Status: DISCONTINUED | OUTPATIENT
Start: 2024-02-03 | End: 2024-02-04

## 2024-02-03 RX ORDER — ACETAMINOPHEN 325 MG/1
650 TABLET ORAL EVERY 6 HOURS PRN
Status: DISCONTINUED | OUTPATIENT
Start: 2024-02-03 | End: 2024-02-09 | Stop reason: HOSPADM

## 2024-02-03 RX ORDER — SODIUM CHLORIDE FOR INHALATION 0.9 %
3 VIAL, NEBULIZER (ML) INHALATION
Status: DISCONTINUED | OUTPATIENT
Start: 2024-02-03 | End: 2024-02-04

## 2024-02-03 RX ORDER — FORMOTEROL FUMARATE DIHYDRATE 20 UG/2ML
20 SOLUTION RESPIRATORY (INHALATION)
Status: DISCONTINUED | OUTPATIENT
Start: 2024-02-03 | End: 2024-02-04

## 2024-02-03 RX ORDER — SODIUM CHLORIDE FOR INHALATION 0.9 %
3 VIAL, NEBULIZER (ML) INHALATION EVERY 6 HOURS PRN
Status: DISCONTINUED | OUTPATIENT
Start: 2024-02-03 | End: 2024-02-04

## 2024-02-03 RX ORDER — METOPROLOL SUCCINATE 25 MG/1
25 TABLET, EXTENDED RELEASE ORAL
Status: DISCONTINUED | OUTPATIENT
Start: 2024-02-03 | End: 2024-02-09 | Stop reason: HOSPADM

## 2024-02-03 RX ORDER — ENOXAPARIN SODIUM 100 MG/ML
40 INJECTION SUBCUTANEOUS DAILY
Status: DISCONTINUED | OUTPATIENT
Start: 2024-02-04 | End: 2024-02-09 | Stop reason: HOSPADM

## 2024-02-03 RX ORDER — ALBUTEROL SULFATE 2.5 MG/3ML
2 SOLUTION RESPIRATORY (INHALATION) ONCE
Status: COMPLETED | OUTPATIENT
Start: 2024-02-03 | End: 2024-02-03

## 2024-02-03 RX ORDER — ASPIRIN 81 MG/1
81 TABLET, CHEWABLE ORAL DAILY
Status: DISCONTINUED | OUTPATIENT
Start: 2024-02-03 | End: 2024-02-09 | Stop reason: HOSPADM

## 2024-02-03 RX ORDER — BUDESONIDE 0.5 MG/2ML
0.5 INHALANT ORAL
Status: DISPENSED | OUTPATIENT
Start: 2024-02-03 | End: 2024-02-06

## 2024-02-03 RX ORDER — METHYLPREDNISOLONE SODIUM SUCCINATE 40 MG/ML
40 INJECTION, POWDER, LYOPHILIZED, FOR SOLUTION INTRAMUSCULAR; INTRAVENOUS EVERY 8 HOURS SCHEDULED
Status: DISCONTINUED | OUTPATIENT
Start: 2024-02-03 | End: 2024-02-05

## 2024-02-03 RX ORDER — SULFASALAZINE 500 MG/1
500 TABLET ORAL 4 TIMES DAILY
Status: DISCONTINUED | OUTPATIENT
Start: 2024-02-03 | End: 2024-02-09 | Stop reason: HOSPADM

## 2024-02-03 RX ORDER — MAGNESIUM SULFATE HEPTAHYDRATE 40 MG/ML
2 INJECTION, SOLUTION INTRAVENOUS ONCE
Status: COMPLETED | OUTPATIENT
Start: 2024-02-03 | End: 2024-02-03

## 2024-02-03 RX ORDER — FAMOTIDINE 20 MG/1
20 TABLET, FILM COATED ORAL DAILY
Status: DISCONTINUED | OUTPATIENT
Start: 2024-02-04 | End: 2024-02-09 | Stop reason: HOSPADM

## 2024-02-03 RX ORDER — METHYLPREDNISOLONE SOD SUCC 125 MG
1 VIAL (EA) INJECTION ONCE
Status: COMPLETED | OUTPATIENT
Start: 2024-02-03 | End: 2024-02-03

## 2024-02-03 RX ORDER — NICOTINE 21 MG/24HR
1 PATCH, TRANSDERMAL 24 HOURS TRANSDERMAL DAILY
Status: DISCONTINUED | OUTPATIENT
Start: 2024-02-04 | End: 2024-02-09 | Stop reason: HOSPADM

## 2024-02-03 RX ORDER — IPRATROPIUM BROMIDE AND ALBUTEROL SULFATE .5; 3 MG/3ML; MG/3ML
1 SOLUTION RESPIRATORY (INHALATION) ONCE
Status: COMPLETED | OUTPATIENT
Start: 2024-02-03 | End: 2024-02-03

## 2024-02-03 RX ADMIN — ALBUTEROL SULFATE 10 MG: 2.5 SOLUTION RESPIRATORY (INHALATION) at 16:02

## 2024-02-03 RX ADMIN — MAGNESIUM SULFATE HEPTAHYDRATE 2 G: 40 INJECTION, SOLUTION INTRAVENOUS at 15:52

## 2024-02-03 RX ADMIN — METHYLPREDNISOLONE SODIUM SUCCINATE 40 MG: 40 INJECTION, POWDER, FOR SOLUTION INTRAMUSCULAR; INTRAVENOUS at 22:37

## 2024-02-03 RX ADMIN — AZITHROMYCIN MONOHYDRATE 500 MG: 500 INJECTION, POWDER, LYOPHILIZED, FOR SOLUTION INTRAVENOUS at 17:26

## 2024-02-03 RX ADMIN — GUAIFENESIN 600 MG: 600 TABLET, EXTENDED RELEASE ORAL at 20:35

## 2024-02-03 RX ADMIN — REMDESIVIR 200 MG: 100 INJECTION, POWDER, LYOPHILIZED, FOR SOLUTION INTRAVENOUS at 20:35

## 2024-02-03 RX ADMIN — IPRATROPIUM BROMIDE 1 MG: 0.5 SOLUTION RESPIRATORY (INHALATION) at 16:02

## 2024-02-03 RX ADMIN — ATORVASTATIN CALCIUM 40 MG: 40 TABLET, FILM COATED ORAL at 23:13

## 2024-02-03 RX ADMIN — Medication 3 ML: at 21:26

## 2024-02-03 RX ADMIN — IPRATROPIUM BROMIDE 0.5 MG: 0.5 SOLUTION RESPIRATORY (INHALATION) at 20:46

## 2024-02-03 RX ADMIN — SULFASALAZINE 500 MG: 500 TABLET ORAL at 23:14

## 2024-02-03 RX ADMIN — ASPIRIN 81 MG CHEWABLE TABLET 81 MG: 81 TABLET CHEWABLE at 23:14

## 2024-02-03 RX ADMIN — METOPROLOL SUCCINATE 25 MG: 25 TABLET, FILM COATED, EXTENDED RELEASE ORAL at 22:26

## 2024-02-03 RX ADMIN — LEVALBUTEROL HYDROCHLORIDE 1.25 MG: 1.25 SOLUTION RESPIRATORY (INHALATION) at 21:26

## 2024-02-03 RX ADMIN — Medication 12 ML: at 16:02

## 2024-02-03 NOTE — ED PROVIDER NOTES
History  Chief Complaint   Patient presents with    Respiratory Distress     Pt has COPD and is having increased SOB     HPI    63-year-old male with a history of COPD, CAD, A-fib, alcohol abuse, tobacco abuse, and Crohn's disease who presents via EMS in respiratory distress.  The patient reports that he has having increasing shortness of breath for the past 3 days.  Per EMS the patient's O2 saturation was in the 70s on their arrival and the patient was tripoding due to dyspnea.  The patient was given 1 DuoNeb and 2 albuterol nebulizer treatments and 125 mg of Solu-Medrol.  The patient reports that the nebulizer treatments are helping but he is still very short of breath.  The patient reports that he has had a productive cough over the past 3 days.  Patient denies fever, chills, headache, chest pain, nausea, vomiting, diarrhea, abdominal pain, pain or swelling in his lower extremities.    Prior to Admission Medications   Prescriptions Last Dose Informant Patient Reported? Taking?   Budeson-Glycopyrrol-Formoterol (Breztri Aerosphere) 160-9-4.8 MCG/ACT AERO   No Yes   Sig: INHALE 2 PUFFS BY MOUTH TWICE A DAY RINSE MOUTH AFTER USE   Multiple Vitamins-Minerals (Centrum Silver 50+Men) TABS  Self Yes Yes   Sig: Take by mouth   Nebulizers (AERONEB GO NEBULIZER HANDSET) MISC  Self No No   Sig: by Does not apply route 2 (two) times a day as needed (wheezing)   albuterol (2.5 mg/3 mL) 0.083 % nebulizer solution   No Yes   Sig: TAKE 3 ML (2.5 MG TOTAL) BY NEBULIZATION EVERY 6 HOURS AS NEEDED FOR WHEEZING OR SHORTNESS OF BREATH   albuterol (PROVENTIL HFA,VENTOLIN HFA) 90 mcg/act inhaler   No Yes   Sig: INHALE 2 PUFFS EVERY 6 HOURS AS NEEDED FOR WHEEZING   aspirin 81 mg chewable tablet   No Yes   Sig: Chew 1 tablet (81 mg total) daily   atorvastatin (LIPITOR) 40 mg tablet   No Yes   Sig: Take 1 tablet (40 mg total) by mouth daily with dinner   metoprolol succinate (TOPROL-XL) 25 mg 24 hr tablet   No Yes   Sig: Take 1 tablet (25  mg total) by mouth daily at bedtime Daily at bedtime   sulfaSALAzine (AZULFIDINE) 500 mg tablet  Self No No   Sig: Take 1 tablet (500 mg total) by mouth 4 (four) times a day      Facility-Administered Medications: None       Past Medical History:   Diagnosis Date    Anxiety     Asthma     Cardiac disease     Cervical stenosis (uterine cervix)     Chest pain     Chronic kidney disease     Colitis     Colon polyps     COPD (chronic obstructive pulmonary disease) (HCC)     2L @ HS and PRN    Coronary artery disease     Diverticulitis     Esophageal reflux     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HCC)     Heart failure, systolic, due to idiopathic cardiomyopathy (HCC) 5/21/2018    Hyperlipidemia     Hypertension     IBD (inflammatory bowel disease)     Myocardial infarction (HCC)     Old myocardial infarction     Paroxysmal atrial fibrillation (HCC) 8/24/2018    Perforation of colon (HCC)     Type 2 diabetes, diet controlled (HCC)     Ulcerative colitis (HCC)        Past Surgical History:   Procedure Laterality Date    ANGIOPLASTY      APPENDECTOMY      COLON SURGERY      COLONOSCOPY N/A 10/24/2016    Procedure: COLONOSCOPY;  Surgeon: Tirso Juárez MD;  Location: [facility];  Service:     CORONARY ANGIOPLASTY WITH STENT PLACEMENT      ESOPHAGOGASTRODUODENOSCOPY N/A 10/24/2016    Procedure: ESOPHAGOGASTRODUODENOSCOPY (EGD);  Surgeon: Tirso Juárez MD;  Location: [facility];  Service:     EXPLORATORY LAPAROTOMY W/ BOWEL RESECTION N/A 5/22/2018    Procedure: EXPLORATORY LAPAROTOMY, ILIOCOLECTOMY, ILIOCOLONIC ANASTAMOSIS, LOOP ILIOSTOMY, REPAIR OF SEROSAL TEAR, EXTENSIVE LYSIS OF ADHESIONS, WOUND VAC PLACEMENT;  Surgeon: Tirso Juárez MD;  Location: [facility];  Service: Colorectal    HEMICOLECTOMY      ILEOSTOMY CLOSURE N/A 10/5/2018    Procedure: CLOSURE ILEOSTOMY;  Surgeon: Tirso Juárez MD;  Location: [facility];  Service: Colorectal    OTHER SURGICAL HISTORY      stent indications acute myocardial infarction     VA COLONOSCOPY FLX DX W/COLLJ SPEC WHEN PFRMD N/A 2/6/2018    Procedure: COLONOSCOPY;  Surgeon: Tirso Juárez MD;  Location: [facility];  Service: Colorectal    VA COLONOSCOPY FLX DX W/COLLJ SPEC WHEN PFRMD N/A 10/4/2018    Procedure: COLONOSCOPY;  Surgeon: Tirso Juárez MD;  Location: [facility];  Service: Colorectal    TONSILLECTOMY         Family History   Problem Relation Age of Onset    Coronary artery disease Father     Crohn's disease Father     Stroke Father     Diabetes Family      I have reviewed and agree with the history as documented.    E-Cigarette/Vaping    E-Cigarette Use Never User      E-Cigarette/Vaping Substances    Nicotine No     THC No     CBD No     Flavoring No     Other No     Unknown No      Social History     Tobacco Use    Smoking status: Every Day     Types: Cigars    Smokeless tobacco: Never    Tobacco comments:     4-5cigars per day before nowadays smokes about 1 cigar, trying to cut down.   Vaping Use    Vaping status: Never Used   Substance Use Topics    Alcohol use: Not Currently     Comment: last drink may 2023    Drug use: No        Review of Systems   Constitutional:  Negative for chills and fever.   HENT:  Negative for congestion and sore throat.    Eyes:  Negative for pain and redness.   Respiratory:  Positive for cough, chest tightness, shortness of breath and wheezing.    Cardiovascular:  Negative for chest pain and palpitations.   Gastrointestinal:  Negative for abdominal pain, diarrhea, nausea and vomiting.   Genitourinary:  Negative for dysuria and hematuria.   Musculoskeletal:  Negative for arthralgias and myalgias.   Skin:  Negative for color change, pallor and rash.   Neurological:  Negative for syncope, weakness, light-headedness, numbness and headaches.   All other systems reviewed and are negative.      Physical Exam  ED Triage Vitals   Temperature Pulse Respirations Blood Pressure SpO2   02/03/24 1550 02/03/24 1546 02/03/24 1546 02/03/24 1546 02/03/24 1546    99.4 °F (37.4 °C) (!) 124 (!) 28 127/85 97 %      Temp Source Heart Rate Source Patient Position - Orthostatic VS BP Location FiO2 (%)   02/03/24 1550 02/03/24 1546 02/03/24 1546 02/03/24 1546 02/03/24 1600   Axillary Monitor Sitting Left arm 50      Pain Score       02/03/24 1546       No Pain             Orthostatic Vital Signs  Vitals:    02/03/24 1630 02/03/24 1700 02/03/24 1730 02/03/24 1800   BP: 117/71 116/63 124/77 114/66   Pulse: (!) 117 (!) 116 (!) 119 (!) 115   Patient Position - Orthostatic VS: Sitting Sitting Sitting Sitting       Physical Exam  Constitutional:       General: He is not in acute distress.     Appearance: Normal appearance. He is not ill-appearing, toxic-appearing or diaphoretic.   HENT:      Head: Normocephalic and atraumatic.      Nose: Nose normal.      Mouth/Throat:      Mouth: Mucous membranes are moist.      Pharynx: Oropharynx is clear.   Eyes:      Conjunctiva/sclera: Conjunctivae normal.      Pupils: Pupils are equal, round, and reactive to light.   Cardiovascular:      Rate and Rhythm: Normal rate and regular rhythm.      Pulses: Normal pulses.      Heart sounds: Normal heart sounds. No murmur heard.     No friction rub. No gallop.   Pulmonary:      Effort: Tachypnea and respiratory distress present.      Breath sounds: Decreased air movement present. Wheezing present. No rhonchi or rales.   Abdominal:      General: Abdomen is flat.      Palpations: Abdomen is soft.      Tenderness: There is no abdominal tenderness. There is no guarding or rebound.   Musculoskeletal:         General: No swelling or tenderness. Normal range of motion.      Cervical back: Normal range of motion and neck supple. No rigidity or tenderness.      Right lower leg: No edema.      Left lower leg: No edema.   Lymphadenopathy:      Cervical: No cervical adenopathy.   Skin:     General: Skin is warm and dry.      Capillary Refill: Capillary refill takes less than 2 seconds.      Coloration: Skin is not  jaundiced or pale.      Findings: No bruising, erythema, lesion or rash.   Neurological:      General: No focal deficit present.      Mental Status: He is alert and oriented to person, place, and time.         ED Medications  Medications   ipratropium-albuterol (FOR EMS ONLY) (DUO-NEB) 0.5-2.5 mg/3 mL inhalation solution 3 mL (0 mL Does not apply Given to EMS 2/3/24 1547)   albuterol (FOR EMS ONLY) (2.5 mg/3 mL) 0.083 % inhalation solution 5 mg (0 mg Does not apply Given to EMS 2/3/24 1547)   methylPREDNISolone sodium succinate (FOR EMS ONLY) (Solu-MEDROL) 125 MG injection 125 mg (0 mg Does not apply Given to EMS 2/3/24 1547)   albuterol inhalation solution 10 mg (10 mg Nebulization Given 2/3/24 1602)   ipratropium (ATROVENT) 0.02 % inhalation solution 1 mg (1 mg Nebulization Given 2/3/24 1602)   sodium chloride 0.9 % inhalation solution 12 mL (12 mL Nebulization Given 2/3/24 1602)   magnesium sulfate 2 g/50 mL IVPB (premix) 2 g (0 g Intravenous Stopped 2/3/24 1622)   azithromycin (ZITHROMAX) 500 mg in sodium chloride 0.9% 250mL IVPB 500 mg (500 mg Intravenous New Bag 2/3/24 1726)       Diagnostic Studies  Results Reviewed       Procedure Component Value Units Date/Time    HS Troponin I 2hr [[id number]] Collected: 02/03/24 1757    Lab Status: In process Specimen: Blood from Arm, Left Updated: 02/03/24 1801    COVID/FLU/RSV [[id number]]  (Abnormal) Collected: 02/03/24 1607    Lab Status: Final result Specimen: Nares from Nose Updated: 02/03/24 1656     SARS-CoV-2 Positive     INFLUENZA A PCR Negative     INFLUENZA B PCR Negative     RSV PCR Negative    Narrative:      FOR PEDIATRIC PATIENTS - copy/paste COVID Guidelines URL to browser: [email or web address]    SARS-CoV-2 assay is a Nucleic Acid Amplification assay intended for the  qualitative detection of nucleic acid from SARS-CoV-2 in nasopharyngeal  swabs. Results are for the presumptive identification of  SARS-CoV-2 RNA.    Positive results are indicative of infection with SARS-CoV-2, the virus  causing COVID-19, but do not rule out bacterial infection or co-infection  with other viruses. Laboratories within the [place] and its  territories are required to report all positive results to the appropriate  public health authorities. Negative results do not preclude SARS-CoV-2  infection and should not be used as the sole basis for treatment or other  patient management decisions. Negative results must be combined with  clinical observations, patient history, and epidemiological information.  This test has not been FDA cleared or approved.    This test has been authorized by FDA under an Emergency Use Authorization  (EUA). This test is only authorized for the duration of time the  declaration that circumstances exist justifying the authorization of the  emergency use of an in vitro diagnostic tests for detection of SARS-CoV-2  virus and/or diagnosis of COVID-19 infection under section 564(b)(1) of  the Act, 21 U.S.C. 360bbb-3(b)(1), unless the authorization is terminated  or revoked sooner. The test has been validated but independent review by FDA  and CLIA is pending.    Test performed using Boostable GeneXpert: This RT-PCR assay targets N2,  a region unique to SARS-CoV-2. A conserved region in the E-gene was chosen  for pan-Sarbecovirus detection which includes SARS-CoV-2.    According to CMS-2020-01-R, this platform meets the definition of high-throughput technology.    HS Troponin 0hr (reflex protocol) [[id number]]  (Normal) Collected: 02/03/24 1551    Lab Status: Final result Specimen: Blood from Arm, Left Updated: 02/03/24 1634     hs TnI 0hr 25 ng/L     HS Troponin I 4hr [[id number]]     Lab Status: No result Specimen: Blood     Comprehensive metabolic panel [[id number]]  (Abnormal) Collected: 02/03/24 1551    Lab Status: Final result Specimen: Blood from Arm, Left Updated: 02/03/24 1629     Sodium 137 mmol/L       Potassium 4.7 mmol/L      Chloride 105 mmol/L      CO2 27 mmol/L      ANION GAP 5 mmol/L      BUN 18 mg/dL      Creatinine 1.33 mg/dL      Glucose 118 mg/dL      Calcium 8.6 mg/dL      AST 24 U/L      ALT 36 U/L      Alkaline Phosphatase 110 U/L      Total Protein 7.7 g/dL      Albumin 3.8 g/dL      Total Bilirubin 0.77 mg/dL      eGFR 57 ml/min/1.73sq m     Narrative:      National Kidney Disease Foundation guidelines for Chronic Kidney Disease (CKD):     Stage 1 with normal or high GFR (GFR > 90 mL/min/1.73 square meters)    Stage 2 Mild CKD (GFR = 60-89 mL/min/1.73 square meters)    Stage 3A Moderate CKD (GFR = 45-59 mL/min/1.73 square meters)    Stage 3B Moderate CKD (GFR = 30-44 mL/min/1.73 square meters)    Stage 4 Severe CKD (GFR = 15-29 mL/min/1.73 square meters)    Stage 5 End Stage CKD (GFR <15 mL/min/1.73 square meters)  Note: GFR calculation is accurate only with a steady state creatinine    CBC and differential [[id number]]  (Abnormal) Collected: 02/03/24 1551    Lab Status: Final result Specimen: Blood from Arm, Left Updated: 02/03/24 1609     WBC 11.62 Thousand/uL      RBC 5.01 Million/uL      Hemoglobin 15.0 g/dL      Hematocrit 45.9 %      MCV 92 fL      MCH 29.9 pg      MCHC 32.7 g/dL      RDW 14.7 %      MPV 9.9 fL      Platelets 195 Thousands/uL      nRBC 0 /100 WBCs      Neutrophils Relative 74 %      Immat GRANS % 1 %      Lymphocytes Relative 13 %      Monocytes Relative 12 %      Eosinophils Relative 0 %      Basophils Relative 0 %      Neutrophils Absolute 8.56 Thousands/µL      Immature Grans Absolute 0.06 Thousand/uL      Lymphocytes Absolute 1.56 Thousands/µL      Monocytes Absolute 1.39 Thousand/µL      Eosinophils Absolute 0.01 Thousand/µL      Basophils Absolute 0.04 Thousands/µL     Blood gas, venous [[id number]]  (Abnormal) Collected: 02/03/24 1551    Lab Status: Final result Specimen: Blood from Arm, Left Updated: 02/03/24 1605     pH, Ravindra 7.392     pCO2, Ravindra 42.7 mm Hg      pO2, Ravindra  58.8 mm Hg      HCO3, Ravindra 25.4 mmol/L      Base Excess, Ravindra 0.3 mmol/L      O2 Content, Ravindra 17.4 ml/dL      O2 HGB, VENOUS 81.3 %                    XR chest 1 view portable    (Results Pending)         Procedures  Procedures      ED Course                             SBIRT 20yo+      Flowsheet Row Most Recent Value   Initial Alcohol Screen: US AUDIT-C     1. How often do you have a drink containing alcohol? 0 Filed at: 02/03/2024 1544   2. How many drinks containing alcohol do you have on a typical day you are drinking?  0 Filed at: 02/03/2024 1544   3a. Male UNDER 65: How often do you have five or more drinks on one occasion? 0 Filed at: 02/03/2024 1544   3b. FEMALE Any Age, or MALE 65+: How often do you have 4 or more drinks on one occassion? 0 Filed at: 02/03/2024 1544   Audit-C Score 0 Filed at: 02/03/2024 1544   CASEY: How many times in the past year have you...    Used an illegal drug or used a prescription medication for non-medical reasons? Never Filed at: 02/03/2024 1544                  Medical Decision Making  63-year-old male with a history of COPD, CAD, A-fib, alcohol abuse, tobacco abuse, and Crohn's disease who presents via EMS in respiratory distress.  Patient is tachycardic and tachypneic.  Patient's O2 saturation is 97% while receiving a nebulizer treatment.  On exam the patient is alert and oriented, in respiratory distress, heart is tachycardic with regular rhythm, decreased air movement throughout all lung fields, expiratory wheezes throughout all lung fields, abdomen is soft and nontender, no lower extremity edema or tenderness.  Differential diagnosis includes COPD exacerbation, CHF, pneumonia, viral URI, ACS, pneumothorax.  Will order EKG, troponin, CBC, CMP, VBG, CXR, viral swab. Given the patient's increased work of breathing will initiate BiPAP.  Will additionally order chauhan nebulizer treatment, magnesium sulfate, and azithromycin.    EKG rate 124, sinus rhythm, left posterior fascicular  block, normal intervals, T wave inversions in the anterior leads, no ST elevation or depression.  Initial troponin is 25 will continue to trend.  The patient has a mild leukocytosis with WBC count of 11.  The remainder the patient's lab work is reviewed without actionable derangements.  Chest x-ray shows no acute cardiopulmonary disease on my interpretation.  The patient is discussed with JOSIE GONSALEZ and admitted for acute hypoxic respiratory failure secondary to COPD exacerbation and COVID-19.    Amount and/or Complexity of Data Reviewed  Labs: ordered.  Radiology: ordered.    Risk  Prescription drug management.  Decision regarding hospitalization.          Disposition  Final diagnoses:   Acute respiratory failure with hypoxia (HCC)   COPD exacerbation (HCC)   COVID-19   Cough     Time reflects when diagnosis was documented in both MDM as applicable and the Disposition within this note       Time User Action Codes Description Comment    2/3/2024  6:26 PM Rodolfo Schafer [J96.01] Acute respiratory failure with hypoxia (HCC)     2/3/2024  6:26 PM Rodolfo Schafer Add [J44.1] COPD exacerbation (HCC)     2/3/2024  6:26 PM Rodolfo Schafer Add [U07.1] COVID-19     2/3/2024  6:26 PM Rodolfo Schafer Add [R05.9] Cough           ED Disposition       ED Disposition   Admit    Condition   Stable    Date/Time   Sat Feb 3, 2024 1826    Comment   Case was discussed with ZULEYKA and the patient's admission status was agreed to be Admission Status: inpatient status to the service of Dr. Charles.               Follow-up Information    None         Patient's Medications   Discharge Prescriptions    No medications on file     No discharge procedures on file.    PDMP Review       None             ED Provider  Attending physically available and evaluated Mathew Riddlearnel Bird. I managed the patient along with the ED Attending.    Electronically Signed by           Rodolfo Schafer DO  02/04/24 0026

## 2024-02-03 NOTE — ED ATTENDING ATTESTATION
2/3/2024  I, Milan Marie MD, saw and evaluated the patient. I have discussed the patient with the resident/non-physician practitioner and agree with the resident's/non-physician practitioner's findings, Plan of Care, and MDM as documented in the resident's/non-physician practitioner's note, except where noted. All available labs and Radiology studies were reviewed.  I was present for key portions of any procedure(s) performed by the resident/non-physician practitioner and I was immediately available to provide assistance.       At this point I agree with the current assessment done in the Emergency Department.  I have conducted an independent evaluation of this patient a history and physical is as follows:    ED Course         Critical Care Time  CriticalCare Time    Date/Time: 2/4/2024 9:19 AM    Performed by: Milan Marie MD  Authorized by: Milan Marie MD    Critical care provider statement:     Critical care time (minutes):  36    Critical care time was exclusive of:  Separately billable procedures and treating other patients and teaching time    Critical care was necessary to treat or prevent imminent or life-threatening deterioration of the following conditions:  Respiratory failure    Critical care was time spent personally by me on the following activities:  Obtaining history from patient or surrogate, development of treatment plan with patient or surrogate, evaluation of patient's response to treatment, examination of patient, ordering and performing treatments and interventions, ordering and review of laboratory studies, re-evaluation of patient's condition and review of old charts      62 yo male with hx of copd, svt, htn, hld, here with sob and respiratory distress worsened this morning.  Pt with no fever. Having cough. No cp, no abdominal pain, no n/v/d.  Pt given solumedrol, duoneb by ems and sat improved from 70's to 90's.  Upon arrival to ed, pt in some distress, placed on bipap.  Vss,  afebrile, tachy, tachypnea, lungs with bilateral wheezing, decreased air movement, rrr, abdomen soft nontender, no pedal edema.  Cardiac workup, vbg, chauhan neb, mag, viral swab, abx.

## 2024-02-04 LAB
ALBUMIN SERPL BCP-MCNC: 3.8 G/DL (ref 3.5–5)
ALP SERPL-CCNC: 108 U/L (ref 34–104)
ALT SERPL W P-5'-P-CCNC: 35 U/L (ref 7–52)
ANION GAP SERPL CALCULATED.3IONS-SCNC: 9 MMOL/L
AST SERPL W P-5'-P-CCNC: 19 U/L (ref 13–39)
ATRIAL RATE: 124 BPM
BASOPHILS # BLD AUTO: 0.01 THOUSANDS/ÂΜL (ref 0–0.1)
BASOPHILS NFR BLD AUTO: 0 % (ref 0–1)
BILIRUB SERPL-MCNC: 0.46 MG/DL (ref 0.2–1)
BUN SERPL-MCNC: 24 MG/DL (ref 5–25)
CALCIUM SERPL-MCNC: 8.7 MG/DL (ref 8.4–10.2)
CHLORIDE SERPL-SCNC: 101 MMOL/L (ref 96–108)
CO2 SERPL-SCNC: 27 MMOL/L (ref 21–32)
CREAT SERPL-MCNC: 1.47 MG/DL (ref 0.6–1.3)
CRP SERPL QL: 117.8 MG/L
D DIMER PPP FEU-MCNC: 0.45 UG/ML FEU
EOSINOPHIL # BLD AUTO: 0 THOUSAND/ÂΜL (ref 0–0.61)
EOSINOPHIL NFR BLD AUTO: 0 % (ref 0–6)
ERYTHROCYTE [DISTWIDTH] IN BLOOD BY AUTOMATED COUNT: 14.5 % (ref 11.6–15.1)
GFR SERPL CREATININE-BSD FRML MDRD: 50 ML/MIN/1.73SQ M
GLUCOSE SERPL-MCNC: 188 MG/DL (ref 65–140)
HCT VFR BLD AUTO: 44.9 % (ref 36.5–49.3)
HGB BLD-MCNC: 14.4 G/DL (ref 12–17)
IMM GRANULOCYTES # BLD AUTO: 0.06 THOUSAND/UL (ref 0–0.2)
IMM GRANULOCYTES NFR BLD AUTO: 1 % (ref 0–2)
LYMPHOCYTES # BLD AUTO: 0.55 THOUSANDS/ÂΜL (ref 0.6–4.47)
LYMPHOCYTES NFR BLD AUTO: 8 % (ref 14–44)
MCH RBC QN AUTO: 29.8 PG (ref 26.8–34.3)
MCHC RBC AUTO-ENTMCNC: 32.1 G/DL (ref 31.4–37.4)
MCV RBC AUTO: 93 FL (ref 82–98)
MONOCYTES # BLD AUTO: 0.1 THOUSAND/ÂΜL (ref 0.17–1.22)
MONOCYTES NFR BLD AUTO: 1 % (ref 4–12)
NEUTROPHILS # BLD AUTO: 6.66 THOUSANDS/ÂΜL (ref 1.85–7.62)
NEUTS SEG NFR BLD AUTO: 90 % (ref 43–75)
NRBC BLD AUTO-RTO: 0 /100 WBCS
P AXIS: 74 DEGREES
PLATELET # BLD AUTO: 191 THOUSANDS/UL (ref 149–390)
PLATELET BLD QL SMEAR: ADEQUATE
PMV BLD AUTO: 9.8 FL (ref 8.9–12.7)
POTASSIUM SERPL-SCNC: 4.6 MMOL/L (ref 3.5–5.3)
PR INTERVAL: 116 MS
PROCALCITONIN SERPL-MCNC: 0.12 NG/ML
PROT SERPL-MCNC: 7.7 G/DL (ref 6.4–8.4)
QRS AXIS: 133 DEGREES
QRSD INTERVAL: 98 MS
QT INTERVAL: 324 MS
QTC INTERVAL: 465 MS
RBC # BLD AUTO: 4.84 MILLION/UL (ref 3.88–5.62)
RBC MORPH BLD: NORMAL
SODIUM SERPL-SCNC: 137 MMOL/L (ref 135–147)
T WAVE AXIS: 66 DEGREES
VENTRICULAR RATE: 124 BPM
WBC # BLD AUTO: 7.38 THOUSAND/UL (ref 4.31–10.16)

## 2024-02-04 PROCEDURE — 99232 SBSQ HOSP IP/OBS MODERATE 35: CPT | Performed by: FAMILY MEDICINE

## 2024-02-04 PROCEDURE — 80053 COMPREHEN METABOLIC PANEL: CPT | Performed by: HOSPITALIST

## 2024-02-04 PROCEDURE — 85379 FIBRIN DEGRADATION QUANT: CPT | Performed by: HOSPITALIST

## 2024-02-04 PROCEDURE — 85025 COMPLETE CBC W/AUTO DIFF WBC: CPT | Performed by: HOSPITALIST

## 2024-02-04 PROCEDURE — 87081 CULTURE SCREEN ONLY: CPT | Performed by: FAMILY MEDICINE

## 2024-02-04 PROCEDURE — 94760 N-INVAS EAR/PLS OXIMETRY 1: CPT

## 2024-02-04 PROCEDURE — 86140 C-REACTIVE PROTEIN: CPT | Performed by: HOSPITALIST

## 2024-02-04 PROCEDURE — 99223 1ST HOSP IP/OBS HIGH 75: CPT | Performed by: INTERNAL MEDICINE

## 2024-02-04 PROCEDURE — 93010 ELECTROCARDIOGRAM REPORT: CPT | Performed by: INTERNAL MEDICINE

## 2024-02-04 PROCEDURE — 94640 AIRWAY INHALATION TREATMENT: CPT

## 2024-02-04 PROCEDURE — 84145 PROCALCITONIN (PCT): CPT | Performed by: HOSPITALIST

## 2024-02-04 RX ORDER — LEVALBUTEROL INHALATION SOLUTION 1.25 MG/3ML
1.25 SOLUTION RESPIRATORY (INHALATION)
Status: COMPLETED | OUTPATIENT
Start: 2024-02-04 | End: 2024-02-05

## 2024-02-04 RX ORDER — ECHINACEA PURPUREA EXTRACT 125 MG
1 TABLET ORAL
Status: DISCONTINUED | OUTPATIENT
Start: 2024-02-04 | End: 2024-02-09 | Stop reason: HOSPADM

## 2024-02-04 RX ORDER — ALBUTEROL SULFATE 90 UG/1
2 AEROSOL, METERED RESPIRATORY (INHALATION) EVERY 2 HOUR PRN
Status: DISCONTINUED | OUTPATIENT
Start: 2024-02-04 | End: 2024-02-09 | Stop reason: HOSPADM

## 2024-02-04 RX ORDER — LEVALBUTEROL INHALATION SOLUTION 1.25 MG/3ML
1.25 SOLUTION RESPIRATORY (INHALATION)
Status: DISCONTINUED | OUTPATIENT
Start: 2024-02-04 | End: 2024-02-04

## 2024-02-04 RX ORDER — ALBUTEROL SULFATE 2.5 MG/3ML
2.5 SOLUTION RESPIRATORY (INHALATION) EVERY 6 HOURS PRN
Status: DISCONTINUED | OUTPATIENT
Start: 2024-02-04 | End: 2024-02-08

## 2024-02-04 RX ADMIN — IPRATROPIUM BROMIDE 0.5 MG: 0.5 SOLUTION RESPIRATORY (INHALATION) at 19:47

## 2024-02-04 RX ADMIN — IPRATROPIUM BROMIDE 0.5 MG: 0.5 SOLUTION RESPIRATORY (INHALATION) at 13:44

## 2024-02-04 RX ADMIN — ASPIRIN 81 MG CHEWABLE TABLET 81 MG: 81 TABLET CHEWABLE at 21:34

## 2024-02-04 RX ADMIN — METHYLPREDNISOLONE SODIUM SUCCINATE 40 MG: 40 INJECTION, POWDER, FOR SOLUTION INTRAMUSCULAR; INTRAVENOUS at 14:04

## 2024-02-04 RX ADMIN — GUAIFENESIN 600 MG: 600 TABLET, EXTENDED RELEASE ORAL at 09:39

## 2024-02-04 RX ADMIN — ALBUTEROL SULFATE 2 PUFF: 90 AEROSOL, METERED RESPIRATORY (INHALATION) at 19:34

## 2024-02-04 RX ADMIN — BUDESONIDE 0.5 MG: 0.5 INHALANT RESPIRATORY (INHALATION) at 19:47

## 2024-02-04 RX ADMIN — Medication 1 SPRAY: at 19:35

## 2024-02-04 RX ADMIN — METOPROLOL SUCCINATE 25 MG: 25 TABLET, FILM COATED, EXTENDED RELEASE ORAL at 21:34

## 2024-02-04 RX ADMIN — SULFASALAZINE 500 MG: 500 TABLET ORAL at 09:40

## 2024-02-04 RX ADMIN — SULFASALAZINE 500 MG: 500 TABLET ORAL at 14:04

## 2024-02-04 RX ADMIN — SULFASALAZINE 500 MG: 500 TABLET ORAL at 21:34

## 2024-02-04 RX ADMIN — ENOXAPARIN SODIUM 40 MG: 40 INJECTION SUBCUTANEOUS at 09:39

## 2024-02-04 RX ADMIN — LEVALBUTEROL HYDROCHLORIDE 1.25 MG: 1.25 SOLUTION RESPIRATORY (INHALATION) at 07:48

## 2024-02-04 RX ADMIN — Medication 1 TABLET: at 09:39

## 2024-02-04 RX ADMIN — IPRATROPIUM BROMIDE 0.5 MG: 0.5 SOLUTION RESPIRATORY (INHALATION) at 07:49

## 2024-02-04 RX ADMIN — METHYLPREDNISOLONE SODIUM SUCCINATE 40 MG: 40 INJECTION, POWDER, FOR SOLUTION INTRAMUSCULAR; INTRAVENOUS at 21:35

## 2024-02-04 RX ADMIN — LEVALBUTEROL HYDROCHLORIDE 1.25 MG: 1.25 SOLUTION RESPIRATORY (INHALATION) at 13:44

## 2024-02-04 RX ADMIN — FAMOTIDINE 20 MG: 20 TABLET, FILM COATED ORAL at 09:39

## 2024-02-04 RX ADMIN — NICOTINE 1 PATCH: 21 PATCH, EXTENDED RELEASE TRANSDERMAL at 10:05

## 2024-02-04 RX ADMIN — GUAIFENESIN 600 MG: 600 TABLET, EXTENDED RELEASE ORAL at 17:16

## 2024-02-04 RX ADMIN — REMDESIVIR 100 MG: 100 INJECTION, POWDER, LYOPHILIZED, FOR SOLUTION INTRAVENOUS at 19:31

## 2024-02-04 RX ADMIN — METHYLPREDNISOLONE SODIUM SUCCINATE 40 MG: 40 INJECTION, POWDER, FOR SOLUTION INTRAMUSCULAR; INTRAVENOUS at 05:14

## 2024-02-04 RX ADMIN — ATORVASTATIN CALCIUM 40 MG: 40 TABLET, FILM COATED ORAL at 21:34

## 2024-02-04 RX ADMIN — LEVALBUTEROL HYDROCHLORIDE 1.25 MG: 1.25 SOLUTION RESPIRATORY (INHALATION) at 19:47

## 2024-02-04 RX ADMIN — ALBUTEROL SULFATE 2.5 MG: 2.5 SOLUTION RESPIRATORY (INHALATION) at 03:10

## 2024-02-04 RX ADMIN — BUDESONIDE 0.5 MG: 0.5 INHALANT RESPIRATORY (INHALATION) at 07:49

## 2024-02-04 RX ADMIN — SULFASALAZINE 500 MG: 500 TABLET ORAL at 17:16

## 2024-02-04 NOTE — CONSULTS
Consultation - Pulmonary Medicine   Mathew Bird 63 y.o. male MRN: [id number]  Unit/Bed#: [facility] 808-01 Encounter: [id number]      Physician Requesting Consult: Dr. Odom  Reason for Consult: COPD exacerbation    Assessment/Plan:    Acute on chronic hypoxic respiratory failure - 4LPM home oxygen  Very severe obstruction with acute exacerbation FEV1 35% predicted  Nicotine dependence, cigars, uncomplicated - 1-3 cigars per day  Cad  Atrial fibrillation  Alcohol abuse  Crohn's disease  Multiple pulmonary nodules    The patient is short of breath and COVID positive without evidence of pneumonia on imaging.  - Cont. IV steroids  - Cont. Nebulized bronchodilators. D/C Formoterol while on frequent Levalbuterol.   - Cont. Remdesivir for COVID  - OOB ambulate as able  - evaluate ambulatory oxygen needs when close to d/c  - qualifies for handicap sign, needs paperwork completed and notarized.  Pt advised. I'm not sure if there's an available inpatient notary.   - DVT Px    Discussed with pt. Will need o/p f/u at discharge. Advised him that he will transition to new pulmonologist since my outpt time is limited. He is agreeable.    ______________________________________________________________________    Chief complaint:  hi    HPI:    Mathew Bird is a 63 y.o. male PMH COPD, cad, atrial fibrillation, alcohol and tobacco abuse, Crohn's disease who presented with respiratory distress. Found to be hypoxic in 70s on arrival of EMS. Treated with IV steroids and nebs. CXR unrevealing. VS showed sinus tachycardia. Labs revealed COVID positive, elevated creatinine, leukocytosis.   Admitted for AECOPD. We are consulted to help with management.    Found lying in bed. Still winded. States he has been winded for 3-4 days. Unable to sleep due to SOB.     PFT results:  Spirometry 2/20 per my review reveals very severe obstruction FEV1 1.48L 35% predicted    Review of Systems:  Positive as above and negative  otherwise    Historical Information   Past Medical History:   Diagnosis Date    Anxiety     Asthma     Cardiac disease     Cervical stenosis (uterine cervix)     Chest pain     Chronic kidney disease     Colitis     Colon polyps     COPD (chronic obstructive pulmonary disease) (HCC)     2L @ HS and PRN    Coronary artery disease     Diverticulitis     Esophageal reflux     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HCC)     Heart failure, systolic, due to idiopathic cardiomyopathy (HCC) 5/21/2018    Hyperlipidemia     Hypertension     IBD (inflammatory bowel disease)     Myocardial infarction (HCC)     Old myocardial infarction     Paroxysmal atrial fibrillation (HCC) 8/24/2018    Perforation of colon (HCC)     Type 2 diabetes, diet controlled (HCC)     Ulcerative colitis (HCC)      Past Surgical History:   Procedure Laterality Date    ANGIOPLASTY      APPENDECTOMY      COLON SURGERY      COLONOSCOPY N/A 10/24/2016    Procedure: COLONOSCOPY;  Surgeon: Tirso Juárez MD;  Location: [facility];  Service:     CORONARY ANGIOPLASTY WITH STENT PLACEMENT      ESOPHAGOGASTRODUODENOSCOPY N/A 10/24/2016    Procedure: ESOPHAGOGASTRODUODENOSCOPY (EGD);  Surgeon: Tirso Juárez MD;  Location: [facility];  Service:     EXPLORATORY LAPAROTOMY W/ BOWEL RESECTION N/A 5/22/2018    Procedure: EXPLORATORY LAPAROTOMY, ILIOCOLECTOMY, ILIOCOLONIC ANASTAMOSIS, LOOP ILIOSTOMY, REPAIR OF SEROSAL TEAR, EXTENSIVE LYSIS OF ADHESIONS, WOUND VAC PLACEMENT;  Surgeon: Tirso Juárez MD;  Location: [facility];  Service: Colorectal    HEMICOLECTOMY      ILEOSTOMY CLOSURE N/A 10/5/2018    Procedure: CLOSURE ILEOSTOMY;  Surgeon: Tirso Juárez MD;  Location: [facility];  Service: Colorectal    OTHER SURGICAL HISTORY      stent indications acute myocardial infarction    KY COLONOSCOPY FLX DX W/COLLJ SPEC WHEN PFRMD N/A 2/6/2018    Procedure: COLONOSCOPY;  Surgeon: Tirso Juárez MD;  Location: [facility];  Service: Colorectal    KY COLONOSCOPY FLX DX  W/COLLJ SPEC WHEN PFRMD N/A 10/4/2018    Procedure: COLONOSCOPY;  Surgeon: Tirso Juárez MD;  Location: [facility];  Service: Colorectal    TONSILLECTOMY       Social History   Social History     Substance and Sexual Activity   Alcohol Use Not Currently    Comment: last drink may 2023     Social History     Tobacco Use   Smoking Status Every Day    Types: Cigars   Smokeless Tobacco Never   Tobacco Comments    4-5cigars per day before nowadays smokes about 1 cigar, trying to cut down.       Lives alone. Daughter visits.    Family History:   Family History   Problem Relation Age of Onset    Coronary artery disease Father     Crohn's disease Father     Stroke Father     Diabetes Family        Medications:  The patient's active and prehospital medications were reviewed.  Current Facility-Administered Medications   Medication Dose Route Frequency Provider Last Rate    acetaminophen  650 mg Oral Q6H PRN Cierra Charles MD      albuterol  2 puff Inhalation Q2H PRN Cierra Charles MD      albuterol  2.5 mg Nebulization Q6H PRN Cierra Charles MD      aspirin  81 mg Oral Daily Cierra Charles MD      atorvastatin  40 mg Oral Daily With Dinner Cierra Charles MD      budesonide  0.5 mg Nebulization Q12H Cierra Charles MD      enoxaparin  40 mg Subcutaneous Daily Cierra Charles MD      famotidine  20 mg Oral Daily Cierra Charles MD      formoterol  20 mcg Nebulization Q12H Cierra Charles MD      guaiFENesin  600 mg Oral BID Cierra Charles MD      ipratropium  0.5 mg Nebulization Q6H Cierra Charles MD      levalbuterol  1.25 mg Nebulization Q6H Cierra Charles MD      methylPREDNISolone sodium succinate  40 mg Intravenous Q8H [facility] Cierra Charles MD      metoprolol succinate  25 mg Oral HS iCerra Charles MD      multivitamin-minerals  1 tablet Oral Daily Cierra Charles MD      nicotine  1 patch Transdermal Daily Cierra Charles MD      remdesivir  100 mg Intravenous  Q24H Cierra Charles MD      sulfaSALAzine  500 mg Oral 4x Daily Cierra Charles MD           PhysicalExamination:  Vitals:   Vitals:    02/04/24 0310 02/04/24 0340 02/04/24 0341 02/04/24 0739   BP:  108/67 108/67 130/86   BP Location:       Pulse:  101 101 105   Resp:  22  16   Temp:   97.9 °F (36.6 °C) 98.2 °F (36.8 °C)   TempSrc:       SpO2: (!) 89% 92% 92% 90%   Weight:       Height:         Body mass index is 27.44 kg/m².  Temp  Min: 97.9 °F (36.6 °C)  Max: 99.4 °F (37.4 °C)  IBW (Ideal Body Weight): 63.8 kg    SpO2: 90 %,   SpO2 Activity: At Rest,   O2 Device: Mid flow nasal cannula    Gen: WDWN, nad, comfortable  HEENT: NCAT; EOMI; anicteric sclera  Neck: supple  Chest: diminished diffusely, no wheezing, not great air movement  Cardiac: regular  Abd: soft, nd  Ext: no c/c/e  Neuro: nonfocal  Skin: warm, dry    Diagnostic Data:  CBC:   Results from last 7 days   Lab Units 02/04/24  0514 02/03/24  1551   WBC Thousand/uL 7.38 11.62*   HEMOGLOBIN g/dL 14.4 15.0   HEMATOCRIT % 44.9 45.9   PLATELETS Thousands/uL 191 195       CMP:   Results from last 7 days   Lab Units 02/04/24  0348 02/03/24  1551   SODIUM mmol/L 137 137   POTASSIUM mmol/L 4.6 4.7   CHLORIDE mmol/L 101 105   CO2 mmol/L 27 27   BUN mg/dL 24 18   CREATININE mg/dL 1.47* 1.33*   CALCIUM mg/dL 8.7 8.6   ALK PHOS U/L 108* 110*   ALT U/L 35 36   AST U/L 19 24     Microbiology: COVID positive        Imaging:  CXR per my review shows clear b/l lungs    Cardiac lab/EKG/telemetry/ECHO:       ECHO 4/26/23: EF low normal; RV mildly dilated; unchanged compared to previous ECHO from 2018    Nat Jones DO

## 2024-02-04 NOTE — ASSESSMENT & PLAN NOTE
Precipitated by COVID infection. Severe exacerbation, was in tripod position with saturation in 70s upon EMS arrival, status post multiple neb treatments, IV steroids by EMS, upon arrival to ED still in distress hence was placed on BiPAP.    IV mag and azithromycin  Now on BL 4 L O2 - Baseline  Solumderol 40 TID  Xopenex/atrovent TID  Pulmicort BID  Performist BID  Consult Pulmonology   Home regimen: Breztri daily & albuterol INH & neb prn

## 2024-02-04 NOTE — PROGRESS NOTES
[facility]  Progress Note  Name: Mathew Bird I  MRN: [id number]  Unit/Bed#: [facility] 808-01 I Date of Admission: 2/3/2024   Date of Service: 2/4/2024 I Hospital Day: 1    Assessment/Plan   * Acute exacerbation of chronic obstructive pulmonary disease (COPD) ([facility])  Assessment & Plan  Precipitated by COVID infection. Severe exacerbation, was in tripod position with saturation in 70s upon EMS arrival, status post multiple neb treatments, IV steroids by EMS, upon arrival to ED still in distress hence was placed on BiPAP.    IV mag and azithromycin  Now on BL 4 L O2 - Baseline  Solumderol 40 TID  Xopenex/atrovent TID  Pulmicort BID  Performist BID  Consult Pulmonology   Home regimen: Breztri daily & albuterol INH & neb prn    Acute on chronic respiratory failure with hypoxia ([facility])  Assessment & Plan  Baseline on 4 L NC, required Bipap on admission   Management as above    COVID-19 virus infection  Assessment & Plan  Was requiring bipap on admission 2/2 COPD exacerbation  Now on his BL 4 L O2   Moderate pathway  Remdesivir  IV solumedrol for COPD  O2 at BL will hold off on baricitinib  Procalcitonin, CRP, ddimer  Procal negative  D dimer 0.52 -->0.45. Given he is now on his baseline oxygen req of 4L will continue lovenox VTE ppx dosing    VT (ventricular tachycardia) ([facility])  Assessment & Plan  H/o VT in past  Cont toprol XL  Cont tele monitoring    Cigarette nicotine dependence without complication  Assessment & Plan  Continues to smoke 2 to 3 cigars daily, has been unable to cut down  Ordered nicotine patch    Dyslipidemia  Assessment & Plan  Continue statin    Stage 4 very severe COPD by GOLD classification ([facility])  Assessment & Plan  Follows with pulmonary as outpatient  Continues to smoke    Coronary artery disease involving native coronary artery of native heart without angina pectoris  Assessment & Plan  History of PCI to RCA  Continue aspirin statin beta-blocker    Crohn disease  ([facility])  Assessment & Plan  Continue home sulfasalazine, he takes whenever he remembers and not exactly as prescribed  He does have chronic diarrhea               VTE Pharmacologic Prophylaxis:   Moderate Risk (Score 3-4) - Pharmacological DVT Prophylaxis Ordered: enoxaparin (Lovenox).    Mobility:   Basic Mobility Inpatient Raw Score: 18  JH-HLM Goal: 6: Walk 10 steps or more  JH-HLM Achieved: 4: Move to chair/commode  HLM Goal achieved. Continue to encourage appropriate mobility.    Patient Centered Rounds: I performed bedside rounds with nursing staff today.   Discussions with Specialists or Other Care Team Provider: Pulmonology    Education and Discussions with Family / Patient: Patient declined call to .     Total Time Spent on Date of Encounter in care of patient: 40 mins. This time was spent on one or more of the following: performing physical exam; counseling and coordination of care; obtaining or reviewing history; documenting in the medical record; reviewing/ordering tests, medications or procedures; communicating with other healthcare professionals and discussing with patient's family/caregivers.    Current Length of Stay: 1 day(s)  Current Patient Status: Inpatient   Certification Statement: The patient will continue to require additional inpatient hospital stay due to COPD exacerbation  Discharge Plan: Anticipate discharge in 48 hrs to pending clinical improvement    Code Status: Level 1 - Full Code    Subjective:   This is a 63-year-old gentleman who was seen evaluated today at bedside.  Patient is not in acute distress.  Patient denies any acute complaints or concerns at this time.    Objective:     Vitals:   Temp (24hrs), Av.5 °F (36.9 °C), Min:97.9 °F (36.6 °C), Max:99.4 °F (37.4 °C)    Temp:  [97.9 °F (36.6 °C)-99.4 °F (37.4 °C)] 98.7 °F (37.1 °C)  HR:  [] 85  Resp:  [16-32] 16  BP: (106-130)/(63-89) 108/63  SpO2:  [86 %-98 %] 86 %  Body mass index is 27.44 kg/m².     Input  and Output Summary (last 24 hours):     Intake/Output Summary (Last 24 hours) at 2/4/2024 1226  Last data filed at 2/4/2024 1001  Gross per 24 hour   Intake 590 ml   Output 500 ml   Net 90 ml       Physical Exam:   Physical Exam  Vitals reviewed.   HENT:      Head: Normocephalic.      Nose: No congestion.      Mouth/Throat:      Pharynx: No oropharyngeal exudate or posterior oropharyngeal erythema.   Eyes:      Conjunctiva/sclera: Conjunctivae normal.   Cardiovascular:      Rate and Rhythm: Normal rate and regular rhythm.      Pulses: Normal pulses.   Pulmonary:      Effort: Pulmonary effort is normal.      Breath sounds: Wheezing present.   Abdominal:      General: Abdomen is flat.      Palpations: Abdomen is soft.   Skin:     General: Skin is warm and dry.   Neurological:      Mental Status: He is alert and oriented to person, place, and time. Mental status is at baseline.          Additional Data:     Labs:  Results from last 7 days   Lab Units 02/04/24  0514   WBC Thousand/uL 7.38   HEMOGLOBIN g/dL 14.4   HEMATOCRIT % 44.9   PLATELETS Thousands/uL 191   NEUTROS PCT % 90*   LYMPHS PCT % 8*   MONOS PCT % 1*   EOS PCT % 0     Results from last 7 days   Lab Units 02/04/24  0348   SODIUM mmol/L 137   POTASSIUM mmol/L 4.6   CHLORIDE mmol/L 101   CO2 mmol/L 27   BUN mg/dL 24   CREATININE mg/dL 1.47*   ANION GAP mmol/L 9   CALCIUM mg/dL 8.7   ALBUMIN g/dL 3.8   TOTAL BILIRUBIN mg/dL 0.46   ALK PHOS U/L 108*   ALT U/L 35   AST U/L 19   GLUCOSE RANDOM mg/dL 188*                 Results from last 7 days   Lab Units 02/04/24  0355 02/03/24  1551   PROCALCITONIN ng/ml 0.12 0.12       Lines/Drains:  Invasive Devices       Peripheral Intravenous Line  Duration             Peripheral IV 02/04/24 Right;Ventral (anterior) Forearm <1 day                      Telemetry:  Telemetry Orders (From admission, onward)               24 Hour Telemetry Monitoring  Continuous x 24 Hours (Telem)        Question:  Reason for 24 Hour Telemetry   Answer:  Acute respiratory failure on BiPAP                     Telemetry Reviewed: Sinus Tachycardia  Indication for Continued Telemetry Use: No indication for continued use. Will discontinue.              Imaging: Reviewed radiology reports from this admission including: chest xray    Recent Cultures (last 7 days):         Last 24 Hours Medication List:   Current Facility-Administered Medications   Medication Dose Route Frequency Provider Last Rate    acetaminophen  650 mg Oral Q6H PRN Cierra Charles MD      albuterol  2 puff Inhalation Q2H PRN Cierra Charles MD      albuterol  2.5 mg Nebulization Q6H PRN Cierra Charles MD      aspirin  81 mg Oral Daily Cierra Charles MD      atorvastatin  40 mg Oral Daily With Dinner Cierra Charles MD      budesonide  0.5 mg Nebulization Q12H Cierra Charles MD      enoxaparin  40 mg Subcutaneous Daily Cierra Charles MD      famotidine  20 mg Oral Daily Cierra Charles MD      guaiFENesin  600 mg Oral BID Cierra Charles MD      ipratropium  0.5 mg Nebulization Q6H Cierra Charles MD      levalbuterol  1.25 mg Nebulization Q6H Cierra Charles MD      methylPREDNISolone sodium succinate  40 mg Intravenous Q8H [facility] Cierra Charles MD      metoprolol succinate  25 mg Oral HS Cierra Charles MD      multivitamin-minerals  1 tablet Oral Daily Cierra Charles MD      nicotine  1 patch Transdermal Daily Cierra Charles MD      remdesivir  100 mg Intravenous Q24H Cierra Charles MD      sulfaSALAzine  500 mg Oral 4x Daily Cierra Charles MD          Today, Patient Was Seen By: Justice Odom MD    **Please Note: This note may have been constructed using a voice recognition system.**

## 2024-02-04 NOTE — H&P
[facility]  H&P  Name: Mathew Bird 63 y.o. male I MRN: [id number]  Unit/Bed#: ED 22 I Date of Admission: 2/3/2024   Date of Service: 2/3/2024 I Hospital Day: 0      Assessment/Plan   Acute exacerbation of chronic obstructive pulmonary disease (COPD) (HCC)  Assessment & Plan  Precipitated by COVID infection  Severe exacerbation, was in tripod position with saturation in 70s upon EMS arrival, status post multiple neb treatments, IV steroids by EMS, upon arrival to ED still in distress hence was placed on BiPAP.  Followed by which she received further nebulizations as well as IV mag and azithromycin  Now on BL 4 L O2  S/p nebs & IV solumedrol in ED  Will place on IV solumderol 40 TID  Xopenex/atrovent TID  Pulmicort BID  Performist BID  Consult pulmonary  Home regimen: Breztri daily & albuterol INH & neb prn    COVID-19 virus infection  Assessment & Plan  Was requiring bipap on admission 2/2 COPD exacerbation  Now on his BL 4 L O2   Will place under mild/moderate pathway  Start remdesivir  IV solumedrol for COPD  Given O2 at BL will hold off on baricitinib  F/u procalcitonin, CRP, ddimer  Currently placed on lovenox - change that based on ddimer    Acute on chronic respiratory failure with hypoxia (HCC)  Assessment & Plan  Presented in respiratory distress, was in tripod position with saturations in 70s upon EMS arrival  Secondary to COPD exacerbation mainly precipitated by COVID infection  When presented to ED he was still not better hence placed on BiPAP for some time after which he is down to 4 L of nasal cannula  Chronically on 4 L nasal cannula around-the-clock    VT (ventricular tachycardia) (HCC)  Assessment & Plan  H/o VT in past  Cont toprol XL  Cont tele monitoring    Cigarette nicotine dependence without complication  Assessment & Plan  Continues to smoke 2 to 3 cigars daily, has been unable to cut down  Ordered nicotine patch    Dyslipidemia  Assessment &  Plan  Continue statin    Stage 4 very severe COPD by GOLD classification (HCC)  Assessment & Plan  Follows with pulmonary as outpatient  Continues to smoke    Coronary artery disease involving native coronary artery of native heart without angina pectoris  Assessment & Plan  History of PCI to RCA  Continue aspirin statin beta-blocker    Crohn disease (HCC)  Assessment & Plan  Continue home sulfasalazine, he takes whenever he remembers and not exactly as prescribed  He does have chronic diarrhea         VTE Pharmacologic Prophylaxis:   Moderate Risk (Score 3-4) - Pharmacological DVT Prophylaxis Ordered: enoxaparin (Lovenox).  Code Status: Level 1 - Full Code   Discussion with family:  patient denied call to daughter.     Anticipated Length of Stay: Patient will be admitted on an inpatient basis with an anticipated length of stay of greater than 2 midnights secondary to COVID, COPD exacerbation.    Total Time Spent on Date of Encounter in care of patient: 60 mins. This time was spent on one or more of the following: performing physical exam; counseling and coordination of care; obtaining or reviewing history; documenting in the medical record; reviewing/ordering tests, medications or procedures; communicating with other healthcare professionals and discussing with patient's family/caregivers.    Chief Complaint: SOB    History of Present Illness:  Mathew Bird is a 63 y.o. male with a PMH of CAD status post PCI to RCA, history of VTE, Crohn's disease, severe COPD, chronic hypoxic respiratory failure requiring 4 L oxygen who presents to ED in respiratory distress.  He states that last since last 2 to 3 days he has been having cough productive of sputum, he did not notice collar, associated with shortness of breath, unable to sleep at night today he was very worse.  This is also associated with some fevers, runny nose, no chest pain, has chronic diarrhea otherwise no abdominal symptoms.  When EMS arrived he was  tripoding with oxygen saturation in 70s.  They gave him Solu-Medrol and nebs and when he arrived to ED he was still not appearing good hence he was placed on BiPAP for some time after which he was able to be weaned down to 4 L oxygen.  He is chronically on 4 L of oxygen, he continues to smoke 2 to 3 cigars a day, he uses his inhaler daily along with prn albuterol    Review of Systems:  Review of Systems   Constitutional:  Positive for fever. Negative for activity change, appetite change and chills.   HENT:  Positive for rhinorrhea.    Respiratory:  Positive for cough, shortness of breath and wheezing.    Cardiovascular:  Negative for chest pain, palpitations and leg swelling.   Gastrointestinal:  Positive for diarrhea. Negative for abdominal pain, nausea and vomiting.   Genitourinary:  Negative for difficulty urinating and dysuria.   Neurological:  Negative for dizziness and light-headedness.   All other systems reviewed and are negative.      Past Medical and Surgical History:   Past Medical History:   Diagnosis Date    Anxiety     Asthma     Cardiac disease     Cervical stenosis (uterine cervix)     Chest pain     Chronic kidney disease     Colitis     Colon polyps     COPD (chronic obstructive pulmonary disease) (HCC)     2L @ HS and PRN    Coronary artery disease     Diverticulitis     Esophageal reflux     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HCC)     Heart failure, systolic, due to idiopathic cardiomyopathy (HCC) 5/21/2018    Hyperlipidemia     Hypertension     IBD (inflammatory bowel disease)     Myocardial infarction (HCC)     Old myocardial infarction     Paroxysmal atrial fibrillation (HCC) 8/24/2018    Perforation of colon (HCC)     Type 2 diabetes, diet controlled (HCC)     Ulcerative colitis (HCC)        Past Surgical History:   Procedure Laterality Date    ANGIOPLASTY      APPENDECTOMY      COLON SURGERY      COLONOSCOPY N/A 10/24/2016    Procedure: COLONOSCOPY;  Surgeon: Tirso Juárez MD;  Location: [facility];  Service:     CORONARY ANGIOPLASTY WITH STENT PLACEMENT      ESOPHAGOGASTRODUODENOSCOPY N/A 10/24/2016    Procedure: ESOPHAGOGASTRODUODENOSCOPY (EGD);  Surgeon: Tirso Juárez MD;  Location: [facility];  Service:     EXPLORATORY LAPAROTOMY W/ BOWEL RESECTION N/A 5/22/2018    Procedure: EXPLORATORY LAPAROTOMY, ILIOCOLECTOMY, ILIOCOLONIC ANASTAMOSIS, LOOP ILIOSTOMY, REPAIR OF SEROSAL TEAR, EXTENSIVE LYSIS OF ADHESIONS, WOUND VAC PLACEMENT;  Surgeon: Tirso Juárez MD;  Location: [facility];  Service: Colorectal    HEMICOLECTOMY      ILEOSTOMY CLOSURE N/A 10/5/2018    Procedure: CLOSURE ILEOSTOMY;  Surgeon: Tirso Juárez MD;  Location: [facility];  Service: Colorectal    OTHER SURGICAL HISTORY      stent indications acute myocardial infarction    CA COLONOSCOPY FLX DX W/COLLJ SPEC WHEN PFRMD N/A 2/6/2018    Procedure: COLONOSCOPY;  Surgeon: Tirso Juárez MD;  Location: [facility];  Service: Colorectal    CA COLONOSCOPY FLX DX W/COLLJ SPEC WHEN PFRMD N/A 10/4/2018    Procedure: COLONOSCOPY;  Surgeon: Tirso Juárez MD;  Location: [facility];  Service: Colorectal    TONSILLECTOMY         Meds/Allergies:  Prior to Admission medications    Medication Sig Start Date End Date Taking? Authorizing Provider   albuterol (2.5 mg/3 mL) 0.083 % nebulizer solution TAKE 3 ML (2.5 MG TOTAL) BY NEBULIZATION EVERY 6 HOURS AS NEEDED FOR WHEEZING OR SHORTNESS OF BREATH 1/29/24  Yes Nat Jones,    albuterol (PROVENTIL HFA,VENTOLIN HFA) 90 mcg/act inhaler INHALE 2 PUFFS EVERY 6 HOURS AS NEEDED FOR WHEEZING 11/27/23  Yes AMALIA Jaime   aspirin 81 mg chewable tablet Chew 1 tablet (81 mg total) daily 8/24/23  Yes AMALIA Wilkerson   atorvastatin (LIPITOR) 40 mg tablet Take 1 tablet (40 mg total) by mouth daily with dinner 8/24/23  Yes AMALIA Wilkerson   Budeson-Glycopyrrol-Formoterol (Breztri Aerosphere) 160-9-4.8 MCG/ACT AERO INHALE 2 PUFFS BY MOUTH TWICE A DAY RINSE MOUTH AFTER USE  12/20/23  Yes Nat Jones,    metoprolol succinate (TOPROL-XL) 25 mg 24 hr tablet Take 1 tablet (25 mg total) by mouth daily at bedtime Daily at bedtime 8/24/23  Yes AMALIA Wilkerson   Multiple Vitamins-Minerals (Centrum Silver 50+Men) TABS Take by mouth   Yes Historical Provider, MD   Nebulizers (AERONEB GO NEBULIZER HANDSET) MISC by Does not apply route 2 (two) times a day as needed (wheezing) 6/8/18   Luciano Echevarria MD   sulfaSALAzine (AZULFIDINE) 500 mg tablet Take 1 tablet (500 mg total) by mouth 4 (four) times a day 5/6/23 9/15/23  France Cole MD   levothyroxine 50 mcg tablet Take 1 tablet (50 mcg total) by mouth daily in the early morning Do not start before May 7, 2023.  Patient not taking: Reported on 9/15/2023 5/7/23 2/3/24  France Cole MD   midodrine (PROAMATINE) 10 MG tablet Take 1 tablet (10 mg total) by mouth 3 (three) times a day before meals  Patient not taking: Reported on 8/24/2023 5/6/23 2/3/24  France Cole MD     I have reviewed home medications with a medical source (PCP, Pharmacy, other).    Allergies:   Allergies   Allergen Reactions    Medical Tape     Other      Brown cloth band aids       Latex Rash       Social History:  Marital Status:    Patient Pre-hospital Living Situation: Home  Patient Pre-hospital Level of Mobility: walks  Patient Pre-hospital Diet Restrictions: none  Substance Use History:   Social History     Substance and Sexual Activity   Alcohol Use Not Currently    Comment: last drink may 2023     Social History     Tobacco Use   Smoking Status Every Day    Types: Cigars   Smokeless Tobacco Never   Tobacco Comments    4-5cigars per day before nowadays smokes about 1 cigar, trying to cut down.     Social History     Substance and Sexual Activity   Drug Use No       Family History:  Family History   Problem Relation Age of Onset    Coronary artery disease Father     Crohn's disease Father     Stroke  Father     Diabetes Family        Physical Exam:     Vitals:   Blood Pressure: 106/66 (02/03/24 1830)  Pulse: (!) 111 (02/03/24 1830)  Temperature: 99.4 °F (37.4 °C) (02/03/24 1550)  Temp Source: Axillary (02/03/24 1550)  Respirations: (!) 30 (02/03/24 1830)  SpO2: 94 % (02/03/24 1830)    Physical Exam  Vitals reviewed.   HENT:      Head: Normocephalic and atraumatic.   Cardiovascular:      Rate and Rhythm: Normal rate and regular rhythm.      Heart sounds: Normal heart sounds.   Pulmonary:      Effort: Pulmonary effort is normal. No respiratory distress.      Breath sounds: Wheezing present.      Comments: Diffuse bilateral wheezing  Abdominal:      General: There is no distension.      Palpations: Abdomen is soft.      Tenderness: There is no abdominal tenderness.   Musculoskeletal:      Right lower leg: No edema.      Left lower leg: No edema.   Skin:     General: Skin is warm.   Neurological:      Mental Status: He is alert and oriented to person, place, and time.          Additional Data:     Lab Results:  Results from last 7 days   Lab Units 02/03/24  1551   WBC Thousand/uL 11.62*   HEMOGLOBIN g/dL 15.0   HEMATOCRIT % 45.9   PLATELETS Thousands/uL 195   NEUTROS PCT % 74   LYMPHS PCT % 13*   MONOS PCT % 12   EOS PCT % 0     Results from last 7 days   Lab Units 02/03/24  1551   SODIUM mmol/L 137   POTASSIUM mmol/L 4.7   CHLORIDE mmol/L 105   CO2 mmol/L 27   BUN mg/dL 18   CREATININE mg/dL 1.33*   ANION GAP mmol/L 5   CALCIUM mg/dL 8.6   ALBUMIN g/dL 3.8   TOTAL BILIRUBIN mg/dL 0.77   ALK PHOS U/L 110*   ALT U/L 36   AST U/L 24   GLUCOSE RANDOM mg/dL 118                       Lines/Drains:  Invasive Devices       Peripheral Intravenous Line  Duration             Peripheral IV 02/03/24 Dorsal (posterior);Right Hand <1 day    Peripheral IV 02/03/24 Left Antecubital <1 day                        Imaging: Personally reviewed the following imaging: chest xray  XR chest 1 view portable    (Results Pending)       EKG  and Other Studies Reviewed on Admission:   EKG:  reviewed sinus tachy with PVC.    ** Please Note: This note has been constructed using a voice recognition system. **

## 2024-02-04 NOTE — ASSESSMENT & PLAN NOTE
Was requiring bipap on admission 2/2 COPD exacerbation  Now on his BL 4 L O2   Will place under mild/moderate pathway  Start remdesivir  IV solumedrol for COPD  Given O2 at BL will hold off on baricitinib  F/u procalcitonin, CRP, ddimer  Currently placed on lovenox - change that based on ddimer

## 2024-02-04 NOTE — ASSESSMENT & PLAN NOTE
Was requiring bipap on admission 2/2 COPD exacerbation  Now on his BL 4 L O2   Moderate pathway  Remdesivir  IV solumedrol for COPD  O2 at BL will hold off on baricitinib  Procalcitonin, CRP, ddimer  Procal negative  D dimer 0.52 -->0.45. Given he is now on his baseline oxygen req of 4L will continue lovenox VTE ppx dosing

## 2024-02-04 NOTE — ASSESSMENT & PLAN NOTE
Presented in respiratory distress, was in tripod position with saturations in 70s upon EMS arrival  Secondary to COPD exacerbation mainly precipitated by COVID infection  When presented to ED he was still not better hence placed on BiPAP for some time after which he is down to 4 L of nasal cannula  Chronically on 4 L nasal cannula around-the-clock

## 2024-02-04 NOTE — RESPIRATORY THERAPY NOTE
"RT Protocol Note  Mathew Bird 63 y.o. male MRN: [id number]  Unit/Bed#: [facility] 808-01 Encounter: [id number]    Assessment    Active Problems:    Crohn disease (HCC)    Coronary artery disease involving native coronary artery of native heart without angina pectoris    Stage 4 very severe COPD by GOLD classification ([facility])    Dyslipidemia    Cigarette nicotine dependence without complication    Acute on chronic respiratory failure with hypoxia ([facility])    VT (ventricular tachycardia) ([facility])    COVID-19 virus infection    Acute exacerbation of chronic obstructive pulmonary disease (COPD) ([facility])      Home Pulmonary Medications:    Home Devices/Therapy: Other (Comment) (albuterol MDI \"all the time\" per pt ,Albuterol udn PRN \"all the time\" per pt)    Past Medical History:   Diagnosis Date    Anxiety     Asthma     Cardiac disease     Cervical stenosis (uterine cervix)     Chest pain     Chronic kidney disease     Colitis     Colon polyps     COPD (chronic obstructive pulmonary disease) ([facility])     2L @ HS and PRN    Coronary artery disease     Diverticulitis     Esophageal reflux     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[facility])     Heart failure, systolic, due to idiopathic cardiomyopathy ([facility]) 5/21/2018    Hyperlipidemia     Hypertension     IBD (inflammatory bowel disease)     Myocardial infarction ([facility])     Old myocardial infarction     Paroxysmal atrial fibrillation ([facility]) 8/24/2018    Perforation of colon ([facility])     Type 2 diabetes, diet controlled ([facility])     Ulcerative colitis ([facility])      Social History     Socioeconomic History    Marital status:      Spouse name: None    Number of children: None    Years of education: None    Highest education level: None   Occupational History    None   Tobacco Use    Smoking status: Every Day     Types: Cigars    Smokeless tobacco: Never    Tobacco comments:     4-5cigars per day before nowadays smokes about 1 cigar, trying to cut down.   Vaping Use    Vaping status: Never Used   Substance and " "Sexual Activity    Alcohol use: Not Currently     Comment: last drink may 2023    Drug use: No    Sexual activity: Yes     Partners: Female     Birth control/protection: None   Other Topics Concern    None   Social History Narrative    None     Social Determinants of Health     Financial Resource Strain: Not on file   Food Insecurity: No Food Insecurity (4/26/2023)    Hunger Vital Sign     Worried About Running Out of Food in the Last Year: Never true     Ran Out of Food in the Last Year: Never true   Transportation Needs: No Transportation Needs (4/26/2023)    PRAPARE - Transportation     Lack of Transportation (Medical): No     Lack of Transportation (Non-Medical): No   Physical Activity: Not on file   Stress: Not on file   Social Connections: Not on file   Intimate Partner Violence: Not on file   Housing Stability: Unknown (4/26/2023)    Housing Stability Vital Sign     Unable to Pay for Housing in the Last Year: No     Number of Places Lived in the Last Year: Not on file     Unstable Housing in the Last Year: No       Subjective         Objective    Physical Exam:   Assessment Type: During-treatment  General Appearance: Alert, Awake  Respiratory Pattern: Accessory muscle use, Tachypneic, Dyspnea at rest, Dyspnea with exertion, Hyperventilation, Spontaneous  Chest Assessment: Chest expansion symmetrical  Bilateral Breath Sounds: Expiratory wheezes, Coarse, Inspiratory wheezes  Cough: Productive, Congested, Moist, Strong  O2 Device: [facility]    Vitals:  Blood pressure 108/67, pulse 101, temperature 97.9 °F (36.6 °C), resp. rate 22, height 5' 6\" (1.676 m), weight 77.1 kg (170 lb), SpO2 92%.          Imaging and other studies:     O2 Device: MFNC     Plan             Resp Comments: pt accidentally removed oxygen while asleep, rt called to give prn neb. pt in mild distress, RR - 28. PRN UDN given. pt placed on midflow for increased WOB, decreased SpO2. MFNC weaned to 6L post UDN, SpO2 89-91%. loose productive cough, BS's " audible coarse I/E wheezes. Pt states he is able to cough up secretions with ease. Pt appears more comfortable after treatment and on 6L. RT will continue to monitor.

## 2024-02-04 NOTE — PLAN OF CARE
Problem: Potential for Falls  Goal: Patient will remain free of falls  Description: INTERVENTIONS:  - Educate patient/family on patient safety including physical limitations  - Instruct patient to call for assistance with activity   - Consult OT/PT to assist with strengthening/mobility   - Keep Call bell within reach  - Keep bed low and locked with side rails adjusted as appropriate  - Keep care items and personal belongings within reach  - Initiate and maintain comfort rounds  - Make Fall Risk Sign visible to staff  - Offer Toileting every 2 Hours, in advance of need  - Initiate/Maintain bed alarm  - Apply yellow socks and bracelet for high fall risk patients  - Consider moving patient to room near nurses station  Outcome: Progressing     Problem: RESPIRATORY - ADULT  Goal: Achieves optimal ventilation and oxygenation  Description: INTERVENTIONS:  - Assess for changes in respiratory status  - Assess for changes in mentation and behavior  - Position to facilitate oxygenation and minimize respiratory effort  - Oxygen administered by appropriate delivery if ordered  - Initiate smoking cessation education as indicated  - Encourage broncho-pulmonary hygiene including cough, deep breathe, Incentive Spirometry  - Assess the need for suctioning and aspirate as needed  - Assess and instruct to report SOB or any respiratory difficulty  - Respiratory Therapy support as indicated  Outcome: Progressing     Problem: MUSCULOSKELETAL - ADULT  Goal: Maintain or return mobility to safest level of function  Description: INTERVENTIONS:  - Assess patient's ability to carry out ADLs; assess patient's baseline for ADL function and identify physical deficits which impact ability to perform ADLs (bathing, care of mouth/teeth, toileting, grooming, dressing, etc.)  - Assess/evaluate cause of self-care deficits   - Assess range of motion  - Assess patient's mobility  - Assess patient's need for assistive devices and provide as  appropriate  - Encourage maximum independence but intervene and supervise when necessary  - Involve family in performance of ADLs  - Assess for home care needs following discharge   - Consider OT consult to assist with ADL evaluation and planning for discharge  - Provide patient education as appropriate  Outcome: Progressing

## 2024-02-04 NOTE — ASSESSMENT & PLAN NOTE
Precipitated by COVID infection  Severe exacerbation, was in tripod position with saturation in 70s upon EMS arrival, status post multiple neb treatments, IV steroids by EMS, upon arrival to ED still in distress hence was placed on BiPAP.  Followed by which she received further nebulizations as well as IV mag and azithromycin  Now on BL 4 L O2  S/p nebs & IV solumedrol in ED  Will place on IV solumderol 40 TID  Xopenex/atrovent TID  Pulmicort BID  Performist BID  Consult pulmonary  Home regimen: Breztri daily & albuterol INH & neb prn

## 2024-02-04 NOTE — ASSESSMENT & PLAN NOTE
Continue home sulfasalazine, he takes whenever he remembers and not exactly as prescribed  He does have chronic diarrhea

## 2024-02-05 LAB
ALBUMIN SERPL BCP-MCNC: 3.4 G/DL (ref 3.5–5)
ALP SERPL-CCNC: 86 U/L (ref 34–104)
ALT SERPL W P-5'-P-CCNC: 25 U/L (ref 7–52)
ANION GAP SERPL CALCULATED.3IONS-SCNC: 5 MMOL/L
AST SERPL W P-5'-P-CCNC: 16 U/L (ref 13–39)
BASOPHILS # BLD AUTO: 0.01 THOUSANDS/ÂΜL (ref 0–0.1)
BASOPHILS NFR BLD AUTO: 0 % (ref 0–1)
BILIRUB SERPL-MCNC: 0.26 MG/DL (ref 0.2–1)
BUN SERPL-MCNC: 31 MG/DL (ref 5–25)
CALCIUM ALBUM COR SERPL-MCNC: 8.8 MG/DL (ref 8.3–10.1)
CALCIUM SERPL-MCNC: 8.3 MG/DL (ref 8.4–10.2)
CHLORIDE SERPL-SCNC: 103 MMOL/L (ref 96–108)
CO2 SERPL-SCNC: 31 MMOL/L (ref 21–32)
CREAT SERPL-MCNC: 1.42 MG/DL (ref 0.6–1.3)
EOSINOPHIL # BLD AUTO: 0 THOUSAND/ÂΜL (ref 0–0.61)
EOSINOPHIL NFR BLD AUTO: 0 % (ref 0–6)
ERYTHROCYTE [DISTWIDTH] IN BLOOD BY AUTOMATED COUNT: 14.1 % (ref 11.6–15.1)
GFR SERPL CREATININE-BSD FRML MDRD: 52 ML/MIN/1.73SQ M
GLUCOSE SERPL-MCNC: 147 MG/DL (ref 65–140)
HCT VFR BLD AUTO: 41.1 % (ref 36.5–49.3)
HGB BLD-MCNC: 12.8 G/DL (ref 12–17)
IMM GRANULOCYTES # BLD AUTO: 0.09 THOUSAND/UL (ref 0–0.2)
IMM GRANULOCYTES NFR BLD AUTO: 1 % (ref 0–2)
LYMPHOCYTES # BLD AUTO: 0.61 THOUSANDS/ÂΜL (ref 0.6–4.47)
LYMPHOCYTES NFR BLD AUTO: 5 % (ref 14–44)
MCH RBC QN AUTO: 29.7 PG (ref 26.8–34.3)
MCHC RBC AUTO-ENTMCNC: 31.1 G/DL (ref 31.4–37.4)
MCV RBC AUTO: 95 FL (ref 82–98)
MONOCYTES # BLD AUTO: 0.68 THOUSAND/ÂΜL (ref 0.17–1.22)
MONOCYTES NFR BLD AUTO: 6 % (ref 4–12)
MRSA NOSE QL CULT: NORMAL
NEUTROPHILS # BLD AUTO: 10.47 THOUSANDS/ÂΜL (ref 1.85–7.62)
NEUTS SEG NFR BLD AUTO: 88 % (ref 43–75)
NRBC BLD AUTO-RTO: 0 /100 WBCS
PLATELET # BLD AUTO: 197 THOUSANDS/UL (ref 149–390)
PMV BLD AUTO: 9.8 FL (ref 8.9–12.7)
POTASSIUM SERPL-SCNC: 5.6 MMOL/L (ref 3.5–5.3)
PROT SERPL-MCNC: 6.6 G/DL (ref 6.4–8.4)
RBC # BLD AUTO: 4.31 MILLION/UL (ref 3.88–5.62)
SODIUM SERPL-SCNC: 139 MMOL/L (ref 135–147)
WBC # BLD AUTO: 11.86 THOUSAND/UL (ref 4.31–10.16)

## 2024-02-05 PROCEDURE — 99232 SBSQ HOSP IP/OBS MODERATE 35: CPT | Performed by: FAMILY MEDICINE

## 2024-02-05 PROCEDURE — 94640 AIRWAY INHALATION TREATMENT: CPT

## 2024-02-05 PROCEDURE — 94760 N-INVAS EAR/PLS OXIMETRY 1: CPT

## 2024-02-05 PROCEDURE — 80053 COMPREHEN METABOLIC PANEL: CPT | Performed by: FAMILY MEDICINE

## 2024-02-05 PROCEDURE — 99232 SBSQ HOSP IP/OBS MODERATE 35: CPT | Performed by: INTERNAL MEDICINE

## 2024-02-05 PROCEDURE — 85025 COMPLETE CBC W/AUTO DIFF WBC: CPT | Performed by: FAMILY MEDICINE

## 2024-02-05 RX ORDER — FLUTICASONE FUROATE AND VILANTEROL 100; 25 UG/1; UG/1
1 POWDER RESPIRATORY (INHALATION) DAILY
Status: DISCONTINUED | OUTPATIENT
Start: 2024-02-06 | End: 2024-02-09 | Stop reason: HOSPADM

## 2024-02-05 RX ADMIN — GUAIFENESIN 600 MG: 600 TABLET, EXTENDED RELEASE ORAL at 09:14

## 2024-02-05 RX ADMIN — ALBUTEROL SULFATE 2 PUFF: 90 AEROSOL, METERED RESPIRATORY (INHALATION) at 12:41

## 2024-02-05 RX ADMIN — IPRATROPIUM BROMIDE 0.5 MG: 0.5 SOLUTION RESPIRATORY (INHALATION) at 20:41

## 2024-02-05 RX ADMIN — ATORVASTATIN CALCIUM 40 MG: 40 TABLET, FILM COATED ORAL at 21:55

## 2024-02-05 RX ADMIN — IPRATROPIUM BROMIDE 0.5 MG: 0.5 SOLUTION RESPIRATORY (INHALATION) at 07:24

## 2024-02-05 RX ADMIN — ALBUTEROL SULFATE 2 PUFF: 90 AEROSOL, METERED RESPIRATORY (INHALATION) at 16:49

## 2024-02-05 RX ADMIN — GUAIFENESIN 600 MG: 600 TABLET, EXTENDED RELEASE ORAL at 18:17

## 2024-02-05 RX ADMIN — BUDESONIDE 0.5 MG: 0.5 INHALANT RESPIRATORY (INHALATION) at 07:24

## 2024-02-05 RX ADMIN — ASPIRIN 81 MG CHEWABLE TABLET 81 MG: 81 TABLET CHEWABLE at 21:54

## 2024-02-05 RX ADMIN — SULFASALAZINE 500 MG: 500 TABLET ORAL at 18:17

## 2024-02-05 RX ADMIN — SULFASALAZINE 500 MG: 500 TABLET ORAL at 21:55

## 2024-02-05 RX ADMIN — NICOTINE 1 PATCH: 21 PATCH, EXTENDED RELEASE TRANSDERMAL at 09:15

## 2024-02-05 RX ADMIN — SULFASALAZINE 500 MG: 500 TABLET ORAL at 09:14

## 2024-02-05 RX ADMIN — LEVALBUTEROL HYDROCHLORIDE 1.25 MG: 1.25 SOLUTION RESPIRATORY (INHALATION) at 13:24

## 2024-02-05 RX ADMIN — LEVALBUTEROL HYDROCHLORIDE 1.25 MG: 1.25 SOLUTION RESPIRATORY (INHALATION) at 20:41

## 2024-02-05 RX ADMIN — ALBUTEROL SULFATE 2 PUFF: 90 AEROSOL, METERED RESPIRATORY (INHALATION) at 19:07

## 2024-02-05 RX ADMIN — ENOXAPARIN SODIUM 40 MG: 40 INJECTION SUBCUTANEOUS at 09:14

## 2024-02-05 RX ADMIN — REMDESIVIR 100 MG: 100 INJECTION, POWDER, LYOPHILIZED, FOR SOLUTION INTRAVENOUS at 18:17

## 2024-02-05 RX ADMIN — Medication 1 TABLET: at 09:14

## 2024-02-05 RX ADMIN — METHYLPREDNISOLONE SODIUM SUCCINATE 40 MG: 40 INJECTION, POWDER, FOR SOLUTION INTRAMUSCULAR; INTRAVENOUS at 05:50

## 2024-02-05 RX ADMIN — FAMOTIDINE 20 MG: 20 TABLET, FILM COATED ORAL at 09:14

## 2024-02-05 RX ADMIN — Medication 1 SPRAY: at 16:50

## 2024-02-05 RX ADMIN — ALBUTEROL SULFATE 2 PUFF: 90 AEROSOL, METERED RESPIRATORY (INHALATION) at 09:13

## 2024-02-05 RX ADMIN — METOPROLOL SUCCINATE 25 MG: 25 TABLET, FILM COATED, EXTENDED RELEASE ORAL at 21:55

## 2024-02-05 RX ADMIN — LEVALBUTEROL HYDROCHLORIDE 1.25 MG: 1.25 SOLUTION RESPIRATORY (INHALATION) at 07:23

## 2024-02-05 RX ADMIN — BUDESONIDE 0.5 MG: 0.5 INHALANT RESPIRATORY (INHALATION) at 20:41

## 2024-02-05 RX ADMIN — SULFASALAZINE 500 MG: 500 TABLET ORAL at 12:37

## 2024-02-05 RX ADMIN — IPRATROPIUM BROMIDE 0.5 MG: 0.5 SOLUTION RESPIRATORY (INHALATION) at 13:23

## 2024-02-05 NOTE — UTILIZATION REVIEW
NOTIFICATION OF INPATIENT ADMISSION   AUTHORIZATION REQUEST   SERVICING FACILITY:   [facility]ly [facility], [facility]  Address: [address] [place] [address]  Tax ID: [id number]  NPI: [id number] ATTENDING PROVIDER:  Attending Name and NPI#: Justice Odom Md [[id number]]  Address: [address] [place] [address]  Phone: [phone]   ADMISSION INFORMATION:  Place of Service: Inpatient [facility]  Place of Service Code: 21  Inpatient Admission Date/Time: 2/3/24  6:27 PM  Discharge Date/Time: No discharge date for patient encounter.  Admitting Diagnosis Code/Description:  Cough [R05.9]  Respiratory distress [R06.03]  Acute exacerbation of chronic obstructive pulmonary disease (COPD) (HCC) [J44.1]  COPD exacerbation (HCC) [J44.1]  Acute respiratory failure with hypoxia (HCC) [J96.01]  COVID-19 virus infection [U07.1]  COVID-19 [U07.1]     UTILIZATION REVIEW CONTACT:  Srinivasan Jimenez, Utilization   Network Utilization Review Department  Phone: [phone]  Fax: [phone]  Email: [email or web address]  Contact for approvals/pending authorizations, clinical reviews, and discharge.     PHYSICIAN ADVISORY SERVICES:  Medical Necessity Denial & [id number] Review  Phone: [phone]  Fax: [phone]  Email: [email or web address]     DISCHARGE SUPPORT TEAM:  For Patients Discharge Needs & Updates  Phone: [phone] opt. 2 Fax: [phone]  Email: [email or web address]

## 2024-02-05 NOTE — ASSESSMENT & PLAN NOTE
Precipitated by COVID infection. Severe exacerbation, was in tripod position with saturation in 70s upon EMS arrival, status post multiple neb treatments, IV steroids by EMS, upon arrival to ED still in distress hence was placed on BiPAP.    IV mag and azithromycin  Now on BL 4 L O2 - Baseline  Solumderol 40 TID transition today to dexamethasone 6 mg daily  Xopenex/atrovent TID  Pulmicort BID  Performist BID  Pulmonology Consulted  Home regimen: Breztri daily & albuterol INH & neb prn

## 2024-02-05 NOTE — PROGRESS NOTES
"Progress Note - Pulmonary   Mathew Bird 63 y.o. male MRN: [id number]  Unit/Bed#: [facility] 808-01 Encounter: [id number]    Assessment:  Acute on chronic hypoxic respiratory failure - 4LPM home oxygen  Very severe obstruction with acute exacerbation FEV1 35% predicted  Nicotine dependence, cigars, uncomplicated - 1-3 cigars per day  Cad  Atrial fibrillation  Alcohol abuse  Crohn's disease  Multiple pulmonary nodules    Plan:  - Will de-escalate steroids from methylpred 40mg Iv q8h to dexamethasone 6mg PO daily. Continue dexamethasone for a total of 10 days or until patient is back on home supplemental oxygen of 4L NC  - Continue nebulized bronchodilators. May resume home Breztri on discharge   - Continue Remdesivir for COVID  - OOB ambulate as able  - Will set patient up with outpatient hospital follow up   - Pulmonology to sign off at this time. Please let us know if you have any further questions or concerns    Subjective:   Patient seen and examined. Reports feeling okay today. He wears 4L NC at home continuously. Currently on 6L NC. Denied fevers, chills, productive cough.     Objective:     Vitals: Blood pressure 121/95, pulse 84, temperature 98.4 °F (36.9 °C), resp. rate 18, height 5' 6\" (1.676 m), weight 77.1 kg (170 lb), SpO2 94%.,Body mass index is 27.44 kg/m².      Intake/Output Summary (Last 24 hours) at 2/5/2024 0818  Last data filed at 2/5/2024 0551  Gross per 24 hour   Intake 530 ml   Output 700 ml   Net -170 ml       Invasive Devices       Peripheral Intravenous Line  Duration             Peripheral IV 02/04/24 Right;Ventral (anterior) Forearm 1 day                    Physical Exam:   General: No acute distress  HENT: Normocephalic, atraumatic.  PERRL, EOMI, Moist mucous membranes.  Neck: Supple  Lungs: some wheezing bilaterally. No increased wob.  On 6L NC  Heart: Regular rate and rhythm, S1-S2 present, no murmurs rubs or gallops  Abdomen: Soft, nontender, nondistended  Extremities: Warm and well " perfused, no cyanosis, clubbing, or edema  Skin: Warm, dry, no rashes or lesions  Neurologic: No focal neurologic deficits     Labs: CBC:   Lab Results   Component Value Date    WBC 11.86 (H) 02/05/2024    HGB 12.8 02/05/2024    HCT 41.1 02/05/2024    MCV 95 02/05/2024     02/05/2024    RBC 4.31 02/05/2024    MCH 29.7 02/05/2024    MCHC 31.1 (L) 02/05/2024    RDW 14.1 02/05/2024    MPV 9.8 02/05/2024    NRBC 0 02/05/2024   , CMP:   Lab Results   Component Value Date    SODIUM 139 02/05/2024    K 5.6 (H) 02/05/2024     02/05/2024    CO2 31 02/05/2024    BUN 31 (H) 02/05/2024    CREATININE 1.42 (H) 02/05/2024    CALCIUM 8.3 (L) 02/05/2024    AST 16 02/05/2024    ALT 25 02/05/2024    ALKPHOS 86 02/05/2024    EGFR 52 02/05/2024     Imaging and other studies: I have personally reviewed pertinent films in PACS  CXR 2/3/2024  IMPRESSION:  No acute cardiopulmonary disease.

## 2024-02-05 NOTE — UTILIZATION REVIEW
Initial Clinical Review    Admission: Date/Time/Statement:   Admission Orders (From admission, onward)       Ordered        02/03/24 1827  INPATIENT ADMISSION  Once                          Orders Placed This Encounter   Procedures    INPATIENT ADMISSION     Standing Status:   Standing     Number of Occurrences:   1     Order Specific Question:   Level of Care     Answer:   Level 2 Stepdown / HOT [14]     Order Specific Question:   Estimated length of stay     Answer:   More than 2 Midnights     Order Specific Question:   Certification     Answer:   I certify that inpatient services are medically necessary for this patient for a duration of greater than two midnights. See H&P and MD Progress Notes for additional information about the patient's course of treatment.     ED Arrival Information       Expected   -    Arrival   2/3/2024 15:41    Acuity   Emergent              Means of arrival   Ambulance    Escorted by   [facility]    Service   Hospitalist    Admission type   Emergency              Arrival complaint   Respiratory Distress             Chief Complaint   Patient presents with    Respiratory Distress     Pt has COPD and is having increased SOB       Initial Presentation: 63 y.o. male with PMHx: CAD status post PCI to RCA, history of VTE, Crohn's disease, severe COPD, chronic hypoxic respiratory failure requiring 4 L oxygen who presented to [facility] ED via EMS due to respiratory distress for 2-3 days, productive cough, shortness of breath, fever, runny nose unable to sleep dt resp distress. Upon EMS arrival pt in tripod position and O2 sats in 70s. IV solu-medrol and neb tx given. In the ED, respiratory distress continued, placed on BiPAP.  On exam, tachycardic and tachypneic, BL wheezing noted.  Plan:  Admit Inpatient status dt COPD exacerbation: Level 2 stepdown unit, continue supplemental O2, IV steroid and scheduled neb txs, Pulmonology consult. COVID positive fu on procal, CRP and  D-dimer. Order nicotine patch, statin and ASA.     Date: 2/4   Day 2: Per Pulmonology:  CXR unrevealing. VS showed sinus tachycardia. Labs revealed COVID positive, elevated creatinine, leukocytosis. PFT results: Spirometry 2/20 reveals very severe obstruction FEV1 1.48L 35% predicted. Continue IV steroid, neb txs, remdesivir, OOB and ambulation and monitor O2 needs.      Update 2/5: O2 requirements increased from 4L NC to 6L NC, continue IV steroid and neb txs, monitor resp status, continue remdesivir, monitor labs.     ED Triage Vitals   Temperature Pulse Respirations Blood Pressure SpO2   02/03/24 1550 02/03/24 1546 02/03/24 1546 02/03/24 1546 02/03/24 1546   99.4 °F (37.4 °C) (!) 124 (!) 28 127/85 97 %      Temp Source Heart Rate Source Patient Position - Orthostatic VS BP Location FiO2 (%)   02/03/24 1550 02/03/24 1546 02/03/24 1546 02/03/24 1546 02/03/24 1600   Axillary Monitor Sitting Left arm 50      Pain Score       02/03/24 1546       No Pain          Wt Readings from Last 1 Encounters:   02/03/24 77.1 kg (170 lb)     Additional Vital Signs:   Date/Time Temp Pulse Resp BP MAP (mmHg) SpO2 FiO2 (%) Calculated FIO2 (%) - Nasal Cannula O2 Flow Rate (L/min) Nasal Cannula O2 Flow Rate (L/min) O2 Device O2 Interface Device Patient Position - Orthostatic VS   02/05/24 0810 -- -- -- -- -- -- -- 44 -- 6 L/min Mid flow nasal cannula -- --   02/05/24 07:55:35 98.4 °F (36.9 °C) 84 18 121/95 104 94 % -- -- -- -- -- -- --   02/05/24 0725 -- -- -- -- -- -- -- 44 -- 6 L/min Mid flow nasal cannula -- --   02/05/24 02:51:56 98.3 °F (36.8 °C) 90 18 118/67 84 94 % -- 44 -- 6 L/min Mid flow nasal cannula -- --   02/05/24 0112 -- -- -- -- -- -- -- 44 -- 6 L/min Mid flow nasal cannula -- --   02/04/24 23:22:18 98.4 °F (36.9 °C) 87 22 122/72 89 93 % -- -- -- -- -- -- --   02/04/24 21:30:07 -- 98 -- 122/67 85 95 % -- -- -- -- -- -- --   02/04/24 1947 -- -- -- -- -- -- -- 44 -- 6 L/min Mid flow nasal cannula -- --   02/04/24 1930 --  -- -- -- -- 95 % -- 44 -- 6 L/min Mid flow nasal cannula -- --   02/04/24 19:04:05 98.5 °F (36.9 °C) 87 22 99/59 72 91 % -- -- -- -- -- -- --   02/04/24 1600 -- -- -- -- -- 93 % -- -- -- -- -- -- --   02/04/24 15:14:47 97.2 °F (36.2 °C) Abnormal  102 22 114/82 93 93 % -- -- -- -- -- -- --   02/04/24 1344 -- -- -- -- -- -- -- 44 -- 6 L/min Mid flow nasal cannula -- --   02/04/24 10:54:37 98.7 °F (37.1 °C) 85 16 108/63 78 86 % Abnormal  -- -- -- -- -- -- --   02/04/24 09:51:43 98.4 °F (36.9 °C) 96 20 114/63 80 95 % -- -- -- -- -- -- --   02/04/24 0945 -- -- -- -- -- -- -- 44 -- 6 L/min Mid flow nasal cannula -- --   02/04/24 0750 -- -- -- -- -- -- -- 44 -- 6 L/min Mid flow nasal cannula -- --   02/04/24 07:39:55 98.2 °F (36.8 °C) 105 16 130/86 101 90 % -- -- -- -- -- -- --   02/04/24 03:41:27 97.9 °F (36.6 °C) 101 -- 108/67 81 92 % -- -- -- -- -- -- --   02/04/24 03:40:43 -- 101 22 108/67 81 92 % -- 44 -- 6 L/min Mid flow nasal cannula -- --   02/04/24 0310 -- -- -- -- -- 89 % Abnormal  -- -- 10 L/min -- Mid flow nasal cannula MFNC prongs --   02/03/24 2241 -- -- -- -- -- 93 % -- 36 -- 4 L/min Nasal cannula -- --   02/03/24 2210 98.5 °F (36.9 °C) 118 Abnormal  24 Abnormal  116/81 93 89 % Abnormal  -- -- -- -- -- -- --   02/03/24 2130 -- 108 Abnormal  26 Abnormal  119/65 84 94 % -- 36 -- 4 L/min Nasal cannula -- Lying   02/03/24 2000 -- 109 Abnormal  -- 116/66 86 94 % -- 36 -- 4 L/min Nasal cannula -- Lying   02/03/24 1830 -- 111 Abnormal  30 Abnormal  106/66 81 94 % -- 36 -- 4 L/min Nasal cannula -- Sitting   02/03/24 1800 -- 115 Abnormal  28 Abnormal  114/66 84 93 % -- 36 -- 4 L/min Nasal cannula -- Sitting   02/03/24 1730 -- 119 Abnormal  32 Abnormal  124/77 96 96 % -- 36 -- 4 L/min Nasal cannula -- Sitting   02/03/24 1700 -- 116 Abnormal  28 Abnormal  116/63 84 97 % -- -- 4 L/min -- Nasal cannula -- Sitting   02/03/24 1630 -- 117 Abnormal  30 Abnormal  117/71 88 97 % -- -- -- -- BiPAP -- Sitting   02/03/24 1600 --  124 Abnormal  30 Abnormal  123/89 103 98 % 50 -- -- -- BiPAP Face mask Sitting   02/03/24 1550 99.4 °F (37.4 °C) -- -- -- -- -- -- -- -- -- -- -- --   02/03/24 1546 -- 124 Abnormal  28 Abnormal  127/85 -- 97 % -- -- -- -- Aerosol mask -- Sitting   02/03/24 1545 -- -- -- -- -- -- -- -- -- -- Aerosol mask -- --     Pertinent Labs/Diagnostic Test Results:   2/3 EKG: Sinus tachycardia with occasional Premature ventricular complexes  Left posterior fascicular block  Septal infarct (cited on or before 25-APR-2023)  Abnormal ECG    XR chest 1 view portable   Final Result by Bouchra Gtz MD (02/04 1107)      No acute cardiopulmonary disease.            Workstation performed: [id number]           Results from last 7 days   Lab Units 02/03/24  1607   SARS-COV-2  Positive*     Results from last 7 days   Lab Units 02/05/24  0550 02/04/24  0514 02/03/24  1551   WBC Thousand/uL 11.86* 7.38 11.62*   HEMOGLOBIN g/dL 12.8 14.4 15.0   HEMATOCRIT % 41.1 44.9 45.9   PLATELETS Thousands/uL 197 191 195   NEUTROS ABS Thousands/µL 10.47* 6.66 8.56*         Results from last 7 days   Lab Units 02/05/24  0550 02/04/24  0348 02/03/24  1551   SODIUM mmol/L 139 137 137   POTASSIUM mmol/L 5.6* 4.6 4.7   CHLORIDE mmol/L 103 101 105   CO2 mmol/L 31 27 27   ANION GAP mmol/L 5 9 5   BUN mg/dL 31* 24 18   CREATININE mg/dL 1.42* 1.47* 1.33*   EGFR ml/min/1.73sq m 52 50 57   CALCIUM mg/dL 8.3* 8.7 8.6     Results from last 7 days   Lab Units 02/05/24  0550 02/04/24  0348 02/03/24  1551   AST U/L 16 19 24   ALT U/L 25 35 36   ALK PHOS U/L 86 108* 110*   TOTAL PROTEIN g/dL 6.6 7.7 7.7   ALBUMIN g/dL 3.4* 3.8 3.8   TOTAL BILIRUBIN mg/dL 0.26 0.46 0.77         Results from last 7 days   Lab Units 02/05/24  0550 02/04/24  0348 02/03/24  1551   GLUCOSE RANDOM mg/dL 147* 188* 118     Results from last 7 days   Lab Units 02/03/24  1551   PH SANAZ  7.392   PCO2 SANAZ mm Hg 42.7   PO2 SANAZ mm Hg 58.8*   HCO3 SANAZ mmol/L 25.4   BASE EXC SANAZ mmol/L 0.3   O2  CONTENT SANAZ ml/dL 17.4   O2 HGB, VENOUS % 81.3*         Results from last 7 days   Lab Units 02/03/24  1551   CK TOTAL U/L 825*     Results from last 7 days   Lab Units 02/03/24  2039 02/03/24  1757 02/03/24  1551   HS TNI 0HR ng/L  --   --  25   HS TNI 2HR ng/L  --  25  --    HSTNI D2 ng/L  --  0  --    HS TNI 4HR ng/L 25  --   --    HSTNI D4 ng/L 0  --   --      Results from last 7 days   Lab Units 02/04/24  0350 02/03/24  2039   D-DIMER QUANTITATIVE ug/ml FEU 0.45 0.52*     Results from last 7 days   Lab Units 02/04/24  0355 02/03/24  1551   PROCALCITONIN ng/ml 0.12 0.12     Results from last 7 days   Lab Units 02/03/24  1551   BNP pg/mL 133*     Results from last 7 days   Lab Units 02/04/24  0348 02/03/24  1551   CRP mg/L 117.8* 105.5*     Results from last 7 days   Lab Units 02/03/24  1607   INFLUENZA A PCR  Negative   INFLUENZA B PCR  Negative   RSV PCR  Negative     ED Treatment:   Medication Administration from 02/03/2024 1541 to 02/03/2024 2154         Date/Time Order Dose Route Action     02/03/2024 1547 EST ipratropium-albuterol (FOR EMS ONLY) (DUO-NEB) 0.5-2.5 mg/3 mL inhalation solution 3 mL 0 mL Does not apply Given to EMS     02/03/2024 1547 EST albuterol (FOR EMS ONLY) (2.5 mg/3 mL) 0.083 % inhalation solution 5 mg 0 mg Does not apply Given to EMS     02/03/2024 1547 EST methylPREDNISolone sodium succinate (FOR EMS ONLY) (Solu-MEDROL) 125 MG injection 125 mg 0 mg Does not apply Given to EMS     02/03/2024 1602 EST albuterol inhalation solution 10 mg 10 mg Nebulization Given     02/03/2024 1602 EST ipratropium (ATROVENT) 0.02 % inhalation solution 1 mg 1 mg Nebulization Given     02/03/2024 1602 EST sodium chloride 0.9 % inhalation solution 12 mL 12 mL Nebulization Given     02/03/2024 1552 EST magnesium sulfate 2 g/50 mL IVPB (premix) 2 g 2 g Intravenous New Bag     02/03/2024 1726 EST azithromycin (ZITHROMAX) 500 mg in sodium chloride 0.9% 250mL IVPB 500 mg 500 mg Intravenous New Bag     02/03/2024  2035 EST remdesivir (Veklury) 200 mg in sodium chloride 0.9 % 290 mL IVPB 200 mg Intravenous New Bag     02/03/2024 2035 EST guaiFENesin (MUCINEX) 12 hr tablet 600 mg 600 mg Oral Given     02/03/2024 2126 EST levalbuterol (XOPENEX) inhalation solution 1.25 mg 1.25 mg Nebulization Given     02/03/2024 2126 EST sodium chloride 0.9 % inhalation solution 3 mL 3 mL Nebulization Given     02/03/2024 2046 EST ipratropium (ATROVENT) 0.02 % inhalation solution 0.5 mg 0.5 mg Nebulization Given          Past Medical History:   Diagnosis Date    Anxiety     Asthma     Cardiac disease     Cervical stenosis (uterine cervix)     Chest pain     Chronic kidney disease     Colitis     Colon polyps     COPD (chronic obstructive pulmonary disease) ([facility])     2L @ HS and PRN    Coronary artery disease     Diverticulitis     Esophageal reflux     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[facility])     Heart failure, systolic, due to idiopathic cardiomyopathy ([facility]) 5/21/2018    Hyperlipidemia     Hypertension     IBD (inflammatory bowel disease)     Myocardial infarction ([facility])     Old myocardial infarction     Paroxysmal atrial fibrillation ([facility]) 8/24/2018    Perforation of colon ([facility])     Type 2 diabetes, diet controlled ([facility])     Ulcerative colitis ([facility])      Present on Admission:   Crohn disease ([facility])   Coronary artery disease involving native coronary artery of native heart without angina pectoris   Stage 4 very severe COPD by GOLD classification ([facility])   Dyslipidemia   Cigarette nicotine dependence without complication   VT (ventricular tachycardia) ([facility])   Acute on chronic respiratory failure with hypoxia ([facility])   Acute exacerbation of chronic obstructive pulmonary disease (COPD) ([facility])      Admitting Diagnosis: Cough [R05.9]  Respiratory distress [R06.03]  Acute exacerbation of chronic obstructive pulmonary disease (COPD) ([facility]) [J44.1]  COPD exacerbation ([facility]) [J44.1]  Acute respiratory failure with hypoxia ([facility]) [J96.01]  COVID-19 virus infection  [U07.1]  COVID-19 [U07.1]  Age/Sex: 63 y.o. male  Admission Orders:  Scheduled Medications:  aspirin, 81 mg, Oral, Daily  atorvastatin, 40 mg, Oral, Daily With Dinner  budesonide, 0.5 mg, Nebulization, Q12H  enoxaparin, 40 mg, Subcutaneous, Daily  famotidine, 20 mg, Oral, Daily  guaiFENesin, 600 mg, Oral, BID  ipratropium, 0.5 mg, Nebulization, TID  levalbuterol, 1.25 mg, Nebulization, TID  methylPREDNISolone sodium succinate, 40 mg, Intravenous, Q8H JUVENTINO  metoprolol succinate, 25 mg, Oral, HS  multivitamin-minerals, 1 tablet, Oral, Daily  nicotine, 1 patch, Transdermal, Daily  remdesivir, 100 mg, Intravenous, Q24H  sulfaSALAzine, 500 mg, Oral, 4x Daily      Continuous IV Infusions: none     PRN Meds:  acetaminophen, 650 mg, Oral, Q6H PRN  albuterol, 2 puff, Inhalation, Q2H PRN x2 thus far  albuterol, 2.5 mg, Nebulization, Q6H PRN x1 thus far  sodium chloride, 1 spray, Each Nare, Q1H PRN    scd    IP CONSULT TO PULMONOLOGY  IP CONSULT TO COPD CARE COORDINATOR    Network Utilization Review Department  ATTENTION: Please call with any questions or concerns to [phone] and carefully listen to the prompts so that you are directed to the right person. All voicemails are confidential.   For Discharge needs, contact Care Management DC Support Team at [phone] opt. [phone]  Send all requests for admission clinical reviews, approved or denied determinations and any other requests to dedicated fax number below belonging to the campus where the patient is receiving treatment. List of dedicated fax numbers for the Facilities:  FACILITY NAME UR FAX NUMBER   ADMISSION DENIALS (Administrative/Medical Necessity) [phone]   DISCHARGE SUPPORT TEAM (NETWORK) [phone]   PARENT CHILD HEALTH (Maternity/NICU/Pediatrics) [phone]   [facility] [phone]   [facility] [phone]   [facility] [phone]   [facility]  [phone]   [facility] [phone]   [facility] [phone]   [facility] [phone]   [facility] [phone]   [facility] [phone]   [facility] [phone]   [facility] [phone]   [facility] [phone]

## 2024-02-05 NOTE — PLAN OF CARE
Problem: RESPIRATORY - ADULT  Goal: Achieves optimal ventilation and oxygenation  Description: INTERVENTIONS:  - Assess for changes in respiratory status  - Assess for changes in mentation and behavior  - Position to facilitate oxygenation and minimize respiratory effort  - Oxygen administered by appropriate delivery if ordered  - Initiate smoking cessation education as indicated  - Encourage broncho-pulmonary hygiene including cough, deep breathe, Incentive Spirometry  - Assess the need for suctioning and aspirate as needed  - Assess and instruct to report SOB or any respiratory difficulty  - Respiratory Therapy support as indicated  Outcome: Progressing     Problem: Prexisting or High Potential for Compromised Skin Integrity  Goal: Skin integrity is maintained or improved  Description: INTERVENTIONS:  - Identify patients at risk for skin breakdown  - Assess and monitor skin integrity  - Assess and monitor nutrition and hydration status  - Monitor labs   - Assess for incontinence   - Turn and reposition patient  - Assist with mobility/ambulation  - Relieve pressure over bony prominences  - Avoid friction and shearing  - Provide appropriate hygiene as needed including keeping skin clean and dry  - Evaluate need for skin moisturizer/barrier cream  - Collaborate with interdisciplinary team   - Patient/family teaching  - Consider wound care consult   Outcome: Progressing

## 2024-02-05 NOTE — PROGRESS NOTES
[facility]  Progress Note  Name: Mathew Bird I  MRN: [id number]  Unit/Bed#: [facility]PHP 808-01 I Date of Admission: 2/3/2024   Date of Service: 2/5/2024 I Hospital Day: 2    Assessment/Plan   * Acute exacerbation of chronic obstructive pulmonary disease (COPD) ([facility])  Assessment & Plan  Precipitated by COVID infection. Severe exacerbation, was in tripod position with saturation in 70s upon EMS arrival, status post multiple neb treatments, IV steroids by EMS, upon arrival to ED still in distress hence was placed on BiPAP.    IV mag and azithromycin  Now on BL 4 L O2 - Baseline  Solumderol 40 TID transition today to dexamethasone 6 mg daily  Xopenex/atrovent TID  Pulmicort BID  Performist BID  Pulmonology Consulted  Home regimen: Breztri daily & albuterol INH & neb prn    Acute on chronic respiratory failure with hypoxia (HCC)  Assessment & Plan  Baseline on 4 L NC, required Bipap on admission   Management as above    COVID-19 virus infection  Assessment & Plan  Was requiring bipap on admission 2/2 COPD exacerbation  Now on his BL 4 L O2   Moderate pathway  Remdesivir  IV solumedrol for COPD  O2 at BL will hold off on baricitinib  Procalcitonin, CRP, ddimer  Procal negative  D dimer 0.52 -->0.45. Given he is now on his baseline oxygen req of 4L will continue lovenox VTE ppx dosing    VT (ventricular tachycardia) ([facility])  Assessment & Plan  H/o VT in past  Cont toprol XL  Cont tele monitoring    Cigarette nicotine dependence without complication  Assessment & Plan  Continues to smoke 2 to 3 cigars daily, has been unable to cut down  Ordered nicotine patch    Dyslipidemia  Assessment & Plan  Continue statin    Stage 4 very severe COPD by GOLD classification ([facility])  Assessment & Plan  Follows with pulmonary as outpatient  Continues to smoke    Coronary artery disease involving native coronary artery of native heart without angina pectoris  Assessment & Plan  History of PCI to  RCA  Continue aspirin statin beta-blocker    Crohn disease (HCC)  Assessment & Plan  Continue home sulfasalazine, he takes whenever he remembers and not exactly as prescribed  He does have chronic diarrhea               VTE Pharmacologic Prophylaxis:   Moderate Risk (Score 3-4) - Pharmacological DVT Prophylaxis Ordered: enoxaparin (Lovenox).    Mobility:   Basic Mobility Inpatient Raw Score: 18  JH-HLM Goal: 6: Walk 10 steps or more  JH-HLM Achieved: 6: Walk 10 steps or more  HLM Goal achieved. Continue to encourage appropriate mobility.    Patient Centered Rounds: I performed bedside rounds with nursing staff today.   Discussions with Specialists or Other Care Team Provider: Pulmonology     Education and Discussions with Family / Patient: Patient declined call to .     Total Time Spent on Date of Encounter in care of patient: 45 mins. This time was spent on one or more of the following: performing physical exam; counseling and coordination of care; obtaining or reviewing history; documenting in the medical record; reviewing/ordering tests, medications or procedures; communicating with other healthcare professionals and discussing with patient's family/caregivers.    Current Length of Stay: 2 day(s)  Current Patient Status: Inpatient   Certification Statement: The patient will continue to require additional inpatient hospital stay due to COPD exacerbation 2/2 COVID  Discharge Plan:  Pending clinical improvement    Code Status: Level 1 - Full Code    Subjective:   This is a very pleasant 63 y.o. male who was seen today at bedside. Patient has no new complaints. Patient is not in any acute distress.       Objective:     Vitals:   Temp (24hrs), Av.2 °F (36.8 °C), Min:97.2 °F (36.2 °C), Max:98.5 °F (36.9 °C)    Temp:  [97.2 °F (36.2 °C)-98.5 °F (36.9 °C)] 98.4 °F (36.9 °C)  HR:  [] 84  Resp:  [18-22] 18  BP: ()/(59-95) 121/95  SpO2:  [91 %-95 %] 94 %  Body mass index is 27.44 kg/m².      Input and Output Summary (last 24 hours):     Intake/Output Summary (Last 24 hours) at 2/5/2024 1406  Last data filed at 2/5/2024 0830  Gross per 24 hour   Intake 630 ml   Output 550 ml   Net 80 ml       Physical Exam:   Physical Exam  Vitals reviewed.   HENT:      Head: Normocephalic.      Nose: No congestion.      Mouth/Throat:      Pharynx: No oropharyngeal exudate or posterior oropharyngeal erythema.   Eyes:      Conjunctiva/sclera: Conjunctivae normal.   Cardiovascular:      Rate and Rhythm: Normal rate and regular rhythm.      Pulses: Normal pulses.   Pulmonary:      Effort: Pulmonary effort is normal.      Breath sounds: Wheezing present.   Abdominal:      General: Abdomen is flat.      Palpations: Abdomen is soft.   Skin:     General: Skin is warm and dry.   Neurological:      Mental Status: He is alert and oriented to person, place, and time. Mental status is at baseline.          Additional Data:     Labs:  Results from last 7 days   Lab Units 02/05/24  0550   WBC Thousand/uL 11.86*   HEMOGLOBIN g/dL 12.8   HEMATOCRIT % 41.1   PLATELETS Thousands/uL 197   NEUTROS PCT % 88*   LYMPHS PCT % 5*   MONOS PCT % 6   EOS PCT % 0     Results from last 7 days   Lab Units 02/05/24  0550   SODIUM mmol/L 139   POTASSIUM mmol/L 5.6*   CHLORIDE mmol/L 103   CO2 mmol/L 31   BUN mg/dL 31*   CREATININE mg/dL 1.42*   ANION GAP mmol/L 5   CALCIUM mg/dL 8.3*   ALBUMIN g/dL 3.4*   TOTAL BILIRUBIN mg/dL 0.26   ALK PHOS U/L 86   ALT U/L 25   AST U/L 16   GLUCOSE RANDOM mg/dL 147*                 Results from last 7 days   Lab Units 02/04/24  0355 02/03/24  1551   PROCALCITONIN ng/ml 0.12 0.12       Lines/Drains:  Invasive Devices       Peripheral Intravenous Line  Duration             Peripheral IV 02/04/24 Right;Ventral (anterior) Forearm 1 day                          Imaging: No pertinent imaging reviewed.    Recent Cultures (last 7 days):         Last 24 Hours Medication List:   Current Facility-Administered Medications    Medication Dose Route Frequency Provider Last Rate    acetaminophen  650 mg Oral Q6H PRN Cierra Charles MD      albuterol  2 puff Inhalation Q2H PRN Cierra Charles MD      albuterol  2.5 mg Nebulization Q6H PRN Cierra Charles MD      aspirin  81 mg Oral Daily Cierra Charles MD      atorvastatin  40 mg Oral Daily With Dinner Cierra Charles MD      budesonide  0.5 mg Nebulization Q12H Justice Odom MD      [START ON 2/6/2024] dexamethasone  6 mg Oral Daily Navi Ferris DO      enoxaparin  40 mg Subcutaneous Daily Cierra Charles MD      famotidine  20 mg Oral Daily Cierra Charles MD      [START ON 2/6/2024] Fluticasone Furoate-Vilanterol  1 puff Inhalation Daily Jimenez Hinton MD      guaiFENesin  600 mg Oral BID Cierra Charles MD      ipratropium  0.5 mg Nebulization TID Jimenez Hinton MD      levalbuterol  1.25 mg Nebulization TID Jimenez Hinton MD      metoprolol succinate  25 mg Oral HS Cierra Charles MD      multivitamin-minerals  1 tablet Oral Daily Cierra Charles MD      nicotine  1 patch Transdermal Daily Cierra Charles MD      remdesivir  100 mg Intravenous Q24H Cierra Charles MD Stopped (02/04/24 2001)    sodium chloride  1 spray Each Nare Q1H PRN Monik Medina MD      sulfaSALAzine  500 mg Oral 4x Daily Cierra Charles MD      [START ON 2/6/2024] umeclidinium  1 puff Inhalation Daily Jimenez Hinton MD          Today, Patient Was Seen By: Justice Odom MD    **Please Note: This note may have been constructed using a voice recognition system.**

## 2024-02-06 LAB
ALBUMIN SERPL BCP-MCNC: 3 G/DL (ref 3.5–5)
ALP SERPL-CCNC: 66 U/L (ref 34–104)
ALT SERPL W P-5'-P-CCNC: 16 U/L (ref 7–52)
ANION GAP SERPL CALCULATED.3IONS-SCNC: 3 MMOL/L
AST SERPL W P-5'-P-CCNC: 11 U/L (ref 13–39)
BASOPHILS # BLD AUTO: 0.01 THOUSANDS/ÂΜL (ref 0–0.1)
BASOPHILS NFR BLD AUTO: 0 % (ref 0–1)
BILIRUB SERPL-MCNC: 0.21 MG/DL (ref 0.2–1)
BUN SERPL-MCNC: 32 MG/DL (ref 5–25)
CALCIUM ALBUM COR SERPL-MCNC: 8.5 MG/DL (ref 8.3–10.1)
CALCIUM SERPL-MCNC: 7.7 MG/DL (ref 8.4–10.2)
CHLORIDE SERPL-SCNC: 107 MMOL/L (ref 96–108)
CO2 SERPL-SCNC: 31 MMOL/L (ref 21–32)
CREAT SERPL-MCNC: 1.44 MG/DL (ref 0.6–1.3)
EOSINOPHIL # BLD AUTO: 0.01 THOUSAND/ÂΜL (ref 0–0.61)
EOSINOPHIL NFR BLD AUTO: 0 % (ref 0–6)
ERYTHROCYTE [DISTWIDTH] IN BLOOD BY AUTOMATED COUNT: 14.3 % (ref 11.6–15.1)
GFR SERPL CREATININE-BSD FRML MDRD: 52 ML/MIN/1.73SQ M
GLUCOSE SERPL-MCNC: 136 MG/DL (ref 65–140)
HCT VFR BLD AUTO: 36.9 % (ref 36.5–49.3)
HEMOCCULT STL QL: POSITIVE
HGB BLD-MCNC: 11.3 G/DL (ref 12–17)
IMM GRANULOCYTES # BLD AUTO: 0.07 THOUSAND/UL (ref 0–0.2)
IMM GRANULOCYTES NFR BLD AUTO: 1 % (ref 0–2)
LYMPHOCYTES # BLD AUTO: 1.3 THOUSANDS/ÂΜL (ref 0.6–4.47)
LYMPHOCYTES NFR BLD AUTO: 12 % (ref 14–44)
MCH RBC QN AUTO: 29.1 PG (ref 26.8–34.3)
MCHC RBC AUTO-ENTMCNC: 30.6 G/DL (ref 31.4–37.4)
MCV RBC AUTO: 95 FL (ref 82–98)
MONOCYTES # BLD AUTO: 1.02 THOUSAND/ÂΜL (ref 0.17–1.22)
MONOCYTES NFR BLD AUTO: 10 % (ref 4–12)
NEUTROPHILS # BLD AUTO: 8.29 THOUSANDS/ÂΜL (ref 1.85–7.62)
NEUTS SEG NFR BLD AUTO: 77 % (ref 43–75)
NRBC BLD AUTO-RTO: 0 /100 WBCS
PLATELET # BLD AUTO: 185 THOUSANDS/UL (ref 149–390)
PMV BLD AUTO: 9.8 FL (ref 8.9–12.7)
POTASSIUM SERPL-SCNC: 4.4 MMOL/L (ref 3.5–5.3)
PROT SERPL-MCNC: 5.7 G/DL (ref 6.4–8.4)
RBC # BLD AUTO: 3.88 MILLION/UL (ref 3.88–5.62)
SODIUM SERPL-SCNC: 141 MMOL/L (ref 135–147)
WBC # BLD AUTO: 10.7 THOUSAND/UL (ref 4.31–10.16)

## 2024-02-06 PROCEDURE — 85025 COMPLETE CBC W/AUTO DIFF WBC: CPT | Performed by: FAMILY MEDICINE

## 2024-02-06 PROCEDURE — 99232 SBSQ HOSP IP/OBS MODERATE 35: CPT | Performed by: FAMILY MEDICINE

## 2024-02-06 PROCEDURE — 94760 N-INVAS EAR/PLS OXIMETRY 1: CPT

## 2024-02-06 PROCEDURE — 82272 OCCULT BLD FECES 1-3 TESTS: CPT | Performed by: FAMILY MEDICINE

## 2024-02-06 PROCEDURE — 80053 COMPREHEN METABOLIC PANEL: CPT | Performed by: FAMILY MEDICINE

## 2024-02-06 PROCEDURE — 94640 AIRWAY INHALATION TREATMENT: CPT

## 2024-02-06 PROCEDURE — 94664 DEMO&/EVAL PT USE INHALER: CPT

## 2024-02-06 RX ADMIN — Medication 1 TABLET: at 08:37

## 2024-02-06 RX ADMIN — GUAIFENESIN 600 MG: 600 TABLET, EXTENDED RELEASE ORAL at 17:09

## 2024-02-06 RX ADMIN — ALBUTEROL SULFATE 2.5 MG: 2.5 SOLUTION RESPIRATORY (INHALATION) at 12:13

## 2024-02-06 RX ADMIN — SULFASALAZINE 500 MG: 500 TABLET ORAL at 08:38

## 2024-02-06 RX ADMIN — ASPIRIN 81 MG CHEWABLE TABLET 81 MG: 81 TABLET CHEWABLE at 21:44

## 2024-02-06 RX ADMIN — SULFASALAZINE 500 MG: 500 TABLET ORAL at 17:09

## 2024-02-06 RX ADMIN — DEXAMETHASONE 6 MG: 4 TABLET ORAL at 08:37

## 2024-02-06 RX ADMIN — GUAIFENESIN 600 MG: 600 TABLET, EXTENDED RELEASE ORAL at 08:37

## 2024-02-06 RX ADMIN — UMECLIDINIUM 1 PUFF: 62.5 AEROSOL, POWDER ORAL at 11:40

## 2024-02-06 RX ADMIN — REMDESIVIR 100 MG: 100 INJECTION, POWDER, LYOPHILIZED, FOR SOLUTION INTRAVENOUS at 21:44

## 2024-02-06 RX ADMIN — ENOXAPARIN SODIUM 40 MG: 40 INJECTION SUBCUTANEOUS at 08:40

## 2024-02-06 RX ADMIN — NICOTINE 1 PATCH: 21 PATCH, EXTENDED RELEASE TRANSDERMAL at 08:38

## 2024-02-06 RX ADMIN — SULFASALAZINE 500 MG: 500 TABLET ORAL at 11:40

## 2024-02-06 RX ADMIN — FAMOTIDINE 20 MG: 20 TABLET, FILM COATED ORAL at 08:37

## 2024-02-06 RX ADMIN — ATORVASTATIN CALCIUM 40 MG: 40 TABLET, FILM COATED ORAL at 21:44

## 2024-02-06 RX ADMIN — FLUTICASONE FUROATE AND VILANTEROL TRIFENATATE 1 PUFF: 100; 25 POWDER RESPIRATORY (INHALATION) at 11:40

## 2024-02-06 RX ADMIN — SULFASALAZINE 500 MG: 500 TABLET ORAL at 21:44

## 2024-02-06 NOTE — DISCHARGE INSTR - OTHER ORDERS
Skin Care Plan:  1-Left Elbow Wound: Cleanse with NSS and pat dry. Apply silicone bordered foam dressing (Mepilex) to wound. Byron with T for Treatment and change every other day or PRN soilage/displacement   2-Turn/reposition q2h or when medically stable for pressure re-distribution on skin .  3-Elevate heels to offload pressure.  4-Moisturize skin daily with skin nourishing cream  5-Ehob cushion in chair when out of bed.  6-Preventative Hydraguard to bilateral sacro-buttocks and heels BID and PRN.

## 2024-02-06 NOTE — PROGRESS NOTES
[facility]  Progress Note  Name: Mathew Bird I  MRN: [id number]  Unit/Bed#: [facility]PHP 808-01 I Date of Admission: 2/3/2024   Date of Service: 2/6/2024 I Hospital Day: 3    Assessment/Plan   COVID-19 virus infection  Assessment & Plan  Was requiring bipap on admission 2/2 COPD exacerbation  Now on his BL 4 L O2   Moderate pathway  Remdesivir-finish the course  IV solumedrol for COPD-transitioned to dexamethasone.  Plan is to finish 10-day course  O2 at BL will hold off on baricitinib  Procalcitonin, CRP, ddimer  Procal negative  D dimer 0.52 -->0.45. Given he is now on his baseline oxygen req of 4L will continue lovenox VTE ppx dosing      VT (ventricular tachycardia) ([facility])  Assessment & Plan  H/o VT in past  Cont toprol XL  Cont tele monitoring    Acute on chronic respiratory failure with hypoxia ([facility])  Assessment & Plan  Baseline on 4 L NC, required Bipap on admission   Management as above    Cigarette nicotine dependence without complication  Assessment & Plan  Continues to smoke 2 to 3 cigars daily, has been unable to cut down  Ordered nicotine patch    Dyslipidemia  Assessment & Plan  Continue statin    Stage 4 very severe COPD by GOLD classification ([facility])  Assessment & Plan  Follows with pulmonary as outpatient  Continues to smoke-cessation advised.  Started on Breo and Incruse by pulmonology-patient reported that he will talk to the outpatient pulmonologist before starting Breo and Incruse as outpatient    Coronary artery disease involving native coronary artery of native heart without angina pectoris  Assessment & Plan  History of PCI to RCA  Continue aspirin statin beta-blocker    Crohn disease ([facility])  Assessment & Plan  Continue home sulfasalazine, he takes whenever he remembers and not exactly as prescribed  He does have chronic diarrhea  Today patient complaining of dark and also purple stools started since he is in the hospital.  No buffy blood.  Slight drop in  hemoglobin noted.  Obtain stool occult blood    * Acute exacerbation of chronic obstructive pulmonary disease (COPD) ([facility])  Assessment & Plan  Precipitated by COVID infection. Severe exacerbation, was in tripod position with saturation in 70s upon EMS arrival, status post multiple neb treatments, IV steroids by EMS, upon arrival to ED still in distress hence was placed on BiPAP.    IV mag and azithromycin  Now on BL 4 L O2 - Baseline  Solumderol 40 TID transition today to dexamethasone 6 mg daily  Xopenex/atrovent TID  Pulmicort BID  Performist BID  Pulmonology Consulted  Home regimen: Breztri daily & albuterol INH & neb prn               VTE Pharmacologic Prophylaxis:   Moderate Risk (Score 3-4) - Pharmacological DVT Prophylaxis Ordered: enoxaparin (Lovenox).    Mobility:   Basic Mobility Inpatient Raw Score: 17  -M Goal: 5: Stand one or more mins  -HLM Achieved: 6: Walk 10 steps or more      Patient Centered Rounds: I performed bedside rounds with nursing staff today.   Discussions with Specialists or Other Care Team Provider:     Education and Discussions with Family / Patient: Patient declined call to .     Total Time Spent on Date of Encounter in care of patient: 35 mins. This time was spent on one or more of the following: performing physical exam; counseling and coordination of care; obtaining or reviewing history; documenting in the medical record; reviewing/ordering tests, medications or procedures; communicating with other healthcare professionals and discussing with patient's family/caregivers.    Current Length of Stay: 3 day(s)  Current Patient Status: Inpatient   Certification Statement: The patient will continue to require additional inpatient hospital stay due to new complaint of dark stools  Discharge Plan: Anticipate discharge tomorrow to home with home services.    Code Status: Level 1 - Full Code    Subjective:   Patient seen and examined.  Reported that from the COVID  standpoint he is feeling better.  Occasional cough.  Denies any shortness of breath.  Appetite is good.  Patient reported that he has noticed dark and purple stools.-No buffy blood.  Noted to have some drop in hemoglobin from previous.    Objective:     Vitals:   Temp (24hrs), Av.4 °F (36.9 °C), Min:98 °F (36.7 °C), Max:98.6 °F (37 °C)    Temp:  [98 °F (36.7 °C)-98.6 °F (37 °C)] 98.6 °F (37 °C)  HR:  [] 87  Resp:  [16-18] 16  BP: (104-128)/(55-91) 113/57  SpO2:  [91 %-98 %] 93 %  Body mass index is 27.44 kg/m².     Input and Output Summary (last 24 hours):     Intake/Output Summary (Last 24 hours) at 2024 1747  Last data filed at 2024 1300  Gross per 24 hour   Intake 420 ml   Output 1150 ml   Net -730 ml       Physical Exam:   Physical Exam  Constitutional:       General: He is not in acute distress.     Appearance: He is not ill-appearing.   HENT:      Head: Normocephalic and atraumatic.      Nose: Nose normal.   Eyes:      General: No scleral icterus.  Cardiovascular:      Rate and Rhythm: Normal rate and regular rhythm.   Pulmonary:      Comments: Decreased breath sounds bilateral  Abdominal:      General: There is no distension.   Musculoskeletal:         General: No swelling. Normal range of motion.   Neurological:      General: No focal deficit present.      Mental Status: He is alert.          Additional Data:     Labs:  Results from last 7 days   Lab Units 24  0519   WBC Thousand/uL 10.70*   HEMOGLOBIN g/dL 11.3*   HEMATOCRIT % 36.9   PLATELETS Thousands/uL 185   NEUTROS PCT % 77*   LYMPHS PCT % 12*   MONOS PCT % 10   EOS PCT % 0     Results from last 7 days   Lab Units 24  0519   SODIUM mmol/L 141   POTASSIUM mmol/L 4.4   CHLORIDE mmol/L 107   CO2 mmol/L 31   BUN mg/dL 32*   CREATININE mg/dL 1.44*   ANION GAP mmol/L 3   CALCIUM mg/dL 7.7*   ALBUMIN g/dL 3.0*   TOTAL BILIRUBIN mg/dL 0.21   ALK PHOS U/L 66   ALT U/L 16   AST U/L 11*   GLUCOSE RANDOM mg/dL 136                  Results from last 7 days   Lab Units 02/04/24  0355 02/03/24  1551   PROCALCITONIN ng/ml 0.12 0.12       Lines/Drains:  Invasive Devices       Peripheral Intravenous Line  Duration             Peripheral IV 02/04/24 Right;Ventral (anterior) Forearm 2 days                          Imaging: Reviewed radiology reports from this admission including: chest xray    Recent Cultures (last 7 days):         Last 24 Hours Medication List:   Current Facility-Administered Medications   Medication Dose Route Frequency Provider Last Rate    acetaminophen  650 mg Oral Q6H PRN Cierra Charles MD      albuterol  2 puff Inhalation Q2H PRN Cierra Charles MD      albuterol  2.5 mg Nebulization Q6H PRN Cierra Charles MD      aspirin  81 mg Oral Daily Cierra Charles MD      atorvastatin  40 mg Oral Daily With Dinner Cierra Charles MD      dexamethasone  6 mg Oral Daily Navi Ferris DO      enoxaparin  40 mg Subcutaneous Daily Cierra Charles MD      famotidine  20 mg Oral Daily Cierra Charles MD      Fluticasone Furoate-Vilanterol  1 puff Inhalation Daily Jimenez Hinton MD      guaiFENesin  600 mg Oral BID Cierra Charles MD      metoprolol succinate  25 mg Oral HS Cierra Charles MD      multivitamin-minerals  1 tablet Oral Daily Cierra Charles MD      nicotine  1 patch Transdermal Daily Cierra Charles MD      remdesivir  100 mg Intravenous Q24H Cierra Charles MD 0 mg (02/04/24 2001)    sodium chloride  1 spray Each Nare Q1H PRN Monik Medina MD      sulfaSALAzine  500 mg Oral 4x Daily Cierra Charles MD      umeclidinium  1 puff Inhalation Daily Jimenez Hinton MD          Today, Patient Was Seen By: Felisa Nguyen MD    **Please Note: This note may have been constructed using a voice recognition system.**

## 2024-02-06 NOTE — ASSESSMENT & PLAN NOTE
Was requiring bipap on admission 2/2 COPD exacerbation  Now on his BL 4 L O2   Moderate pathway  Remdesivir-finish the course  IV solumedrol for COPD-transitioned to dexamethasone.  Plan is to finish 10-day course  O2 at BL will hold off on baricitinib  Procalcitonin, CRP, ddimer  Procal negative  D dimer 0.52 -->0.45. Given he is now on his baseline oxygen req of 4L will continue lovenox VTE ppx dosing

## 2024-02-06 NOTE — PLAN OF CARE
Problem: Potential for Falls  Goal: Patient will remain free of falls  Description: INTERVENTIONS:  - Educate patient/family on patient safety including physical limitations  - Instruct patient to call for assistance with activity   - Consult OT/PT to assist with strengthening/mobility   - Keep Call bell within reach  - Keep bed low and locked with side rails adjusted as appropriate  - Keep care items and personal belongings within reach  - Initiate and maintain comfort rounds  - Make Fall Risk Sign visible to staff  - Offer Toileting every 2 Hours, in advance of need  - Initiate/Maintain bed alarm  - Apply yellow socks and bracelet for high fall risk patients  - Consider moving patient to room near nurses station  Outcome: Progressing     Problem: RESPIRATORY - ADULT  Goal: Achieves optimal ventilation and oxygenation  Description: INTERVENTIONS:  - Assess for changes in respiratory status  - Assess for changes in mentation and behavior  - Position to facilitate oxygenation and minimize respiratory effort  - Oxygen administered by appropriate delivery if ordered  - Initiate smoking cessation education as indicated  - Encourage broncho-pulmonary hygiene including cough, deep breathe, Incentive Spirometry  - Assess the need for suctioning and aspirate as needed  - Assess and instruct to report SOB or any respiratory difficulty  - Respiratory Therapy support as indicated  Outcome: Progressing     Problem: MUSCULOSKELETAL - ADULT  Goal: Maintain or return mobility to safest level of function  Description: INTERVENTIONS:  - Assess patient's ability to carry out ADLs; assess patient's baseline for ADL function and identify physical deficits which impact ability to perform ADLs (bathing, care of mouth/teeth, toileting, grooming, dressing, etc.)  - Assess/evaluate cause of self-care deficits   - Assess range of motion  - Assess patient's mobility  - Assess patient's need for assistive devices and provide as  appropriate  - Encourage maximum independence but intervene and supervise when necessary  - Involve family in performance of ADLs  - Assess for home care needs following discharge   - Consider OT consult to assist with ADL evaluation and planning for discharge  - Provide patient education as appropriate  Outcome: Progressing     Problem: Prexisting or High Potential for Compromised Skin Integrity  Goal: Skin integrity is maintained or improved  Description: INTERVENTIONS:  - Identify patients at risk for skin breakdown  - Assess and monitor skin integrity  - Assess and monitor nutrition and hydration status  - Monitor labs   - Assess for incontinence   - Turn and reposition patient  - Assist with mobility/ambulation  - Relieve pressure over bony prominences  - Avoid friction and shearing  - Provide appropriate hygiene as needed including keeping skin clean and dry  - Evaluate need for skin moisturizer/barrier cream  - Collaborate with interdisciplinary team   - Patient/family teaching  - Consider wound care consult   Outcome: Progressing

## 2024-02-06 NOTE — UTILIZATION REVIEW
Continued Stay Review    Date: 2/6/24                          Current Patient Class: inpatient  Current Level of Care: med surg telemetry    HPI:63 y.o. male initially admitted on 2/3/24 COPD exacerbation     Assessment/Plan: c/o occasional cough and decreased breath sounds noted. Continue neb txs TID, pulmicort BID, pulmonology consult. Pt states of dark and purple stools, no buffy blood. Pt has noted drop in hgb from previous. Monitor stool for occult blood and trend hgb. Continue telemetry, continue ASA and BB.    Wound care following for left elbow deep tissue pressure injury. Left Elbow Wound: Cleanse with NSS and pat dry. Apply silicone bordered foam dressing (Mepilex) to wound. Byron with T for Treatment and change every other day or PRN soilage/displacement. Turn/reposition q2h or when medically stable for pressure re-distribution on skin. Elevate heels to offload pressure. Moisturize skin daily with skin nourishing cream. Ehob cushion in chair when out of bed. Preventative Hydraguard to bilateral sacro-buttocks and heels BID and PRN.     Wound Image   02/06/24 1024       Vital Signs:   Date/Time Temp Pulse Resp BP MAP (mmHg) SpO2 Calculated FIO2 (%) - Nasal Cannula O2 Flow Rate (L/min) Nasal Cannula O2 Flow Rate (L/min) O2 Device O2 Interface Device   02/06/24 15:33:01 -- 87 16 113/57 76 93 % -- -- -- -- --   02/06/24 1215 -- -- -- -- -- 98 % -- -- -- -- --   02/06/24 07:49:28 98.6 °F (37 °C) 65 16 104/66 79 97 % -- -- -- -- --   02/06/24 0720 -- 95 16 -- -- 94 % -- -- -- -- --   02/05/24 23:33:30 98.5 °F (36.9 °C) 99 18 111/91 98 91 % -- -- -- -- --   02/05/24 2230 -- -- -- -- -- -- 36 -- 4 L/min Nasal cannula --   02/05/24 2147 -- 100 -- 121/56 78 92 % 36 -- 4 L/min Nasal cannula --   02/05/24 2041 -- -- -- -- -- -- 44 -- 6 L/min Mid flow nasal cannula --   02/05/24 19:10:10 98 °F (36.7 °C) 101 -- 128/55 79 94 % -- -- -- -- --   02/05/24 15:52:31 98.6 °F (37 °C) 79 22 117/65 82 90 % -- -- -- -- --    02/05/24 1445 -- -- -- -- -- -- -- -- -- Mid flow nasal cannula --   02/05/24 1325 -- -- -- -- -- -- 44 -- 6 L/min Mid flow nasal cannula --     Pertinent Labs/Diagnostic Results:   Results from last 7 days   Lab Units 02/03/24  1607   SARS-COV-2  Positive*     Results from last 7 days   Lab Units 02/06/24 0519 02/05/24  0550 02/04/24  0514 02/03/24  1551   WBC Thousand/uL 10.70* 11.86* 7.38 11.62*   HEMOGLOBIN g/dL 11.3* 12.8 14.4 15.0   HEMATOCRIT % 36.9 41.1 44.9 45.9   PLATELETS Thousands/uL 185 197 191 195   NEUTROS ABS Thousands/µL 8.29* 10.47* 6.66 8.56*         Results from last 7 days   Lab Units 02/06/24 0519 02/05/24 0550 02/04/24 0348 02/03/24  1551   SODIUM mmol/L 141 139 137 137   POTASSIUM mmol/L 4.4 5.6* 4.6 4.7   CHLORIDE mmol/L 107 103 101 105   CO2 mmol/L 31 31 27 27   ANION GAP mmol/L 3 5 9 5   BUN mg/dL 32* 31* 24 18   CREATININE mg/dL 1.44* 1.42* 1.47* 1.33*   EGFR ml/min/1.73sq m 52 52 50 57   CALCIUM mg/dL 7.7* 8.3* 8.7 8.6     Results from last 7 days   Lab Units 02/06/24 0519 02/05/24  0550 02/04/24  0348 02/03/24  1551   AST U/L 11* 16 19 24   ALT U/L 16 25 35 36   ALK PHOS U/L 66 86 108* 110*   TOTAL PROTEIN g/dL 5.7* 6.6 7.7 7.7   ALBUMIN g/dL 3.0* 3.4* 3.8 3.8   TOTAL BILIRUBIN mg/dL 0.21 0.26 0.46 0.77         Results from last 7 days   Lab Units 02/06/24 0519 02/05/24 0550 02/04/24 0348 02/03/24  1551   GLUCOSE RANDOM mg/dL 136 147* 188* 118          Results from last 7 days   Lab Units 02/03/24  1551   PH SANAZ  7.392   PCO2 SANAZ mm Hg 42.7   PO2 SANAZ mm Hg 58.8*   HCO3 SANAZ mmol/L 25.4   BASE EXC SANAZ mmol/L 0.3   O2 CONTENT SANAZ ml/dL 17.4   O2 HGB, VENOUS % 81.3*         Results from last 7 days   Lab Units 02/03/24  1551   CK TOTAL U/L 825*     Results from last 7 days   Lab Units 02/03/24 2039 02/03/24  1757 02/03/24  1551   HS TNI 0HR ng/L  --   --  25   HS TNI 2HR ng/L  --  25  --    HSTNI D2 ng/L  --  0  --    HS TNI 4HR ng/L 25  --   --    HSTNI D4 ng/L 0  --   --       Results from last 7 days   Lab Units 02/04/24  0350 02/03/24  2039   D-DIMER QUANTITATIVE ug/ml FEU 0.45 0.52*       Results from last 7 days   Lab Units 02/04/24  0355 02/03/24  1551   PROCALCITONIN ng/ml 0.12 0.12         Results from last 7 days   Lab Units 02/03/24  1551   BNP pg/mL 133*       Results from last 7 days   Lab Units 02/04/24  0348 02/03/24  1551   CRP mg/L 117.8* 105.5*       Results from last 7 days   Lab Units 02/03/24  1607   INFLUENZA A PCR  Negative   INFLUENZA B PCR  Negative   RSV PCR  Negative     Medications:   Scheduled Medications:  aspirin, 81 mg, Oral, Daily  atorvastatin, 40 mg, Oral, Daily With Dinner  dexamethasone, 6 mg, Oral, Daily  enoxaparin, 40 mg, Subcutaneous, Daily  famotidine, 20 mg, Oral, Daily  Fluticasone Furoate-Vilanterol, 1 puff, Inhalation, Daily  guaiFENesin, 600 mg, Oral, BID  metoprolol succinate, 25 mg, Oral, HS  multivitamin-minerals, 1 tablet, Oral, Daily  nicotine, 1 patch, Transdermal, Daily  remdesivir, 100 mg, Intravenous, Q24H  sulfaSALAzine, 500 mg, Oral, 4x Daily  umeclidinium, 1 puff, Inhalation, Daily      Continuous IV Infusions: none     PRN Meds:  acetaminophen, 650 mg, Oral, Q6H PRN  albuterol, 2 puff, Inhalation, Q2H PRN  albuterol, 2.5 mg, Nebulization, Q6H PRN  sodium chloride, 1 spray, Each Nare, Q1H PRN        Discharge Plan: [facility]    Network Utilization Review Department  ATTENTION: Please call with any questions or concerns to [phone] and carefully listen to the prompts so that you are directed to the right person. All voicemails are confidential.   For Discharge needs, contact Care Management DC Support Team at [phone] opt. [phone]  Send all requests for admission clinical reviews, approved or denied determinations and any other requests to dedicated fax number below belonging to the campus where the patient is receiving treatment. List of dedicated fax numbers for the Facilities:  FACILITY NAME UR FAX NUMBER   ADMISSION DENIALS  (Administrative/Medical Necessity) [phone]   DISCHARGE SUPPORT TEAM (NETWORK) [phone]   PARENT CHILD HEALTH (Maternity/NICU/Pediatrics) [phone]   [facility] [phone]   [facility] [phone]   [facility] [phone]   [facility] [phone]   [facility] [phone]   [facility] [phone]   [facility] [phone]   [facility] [phone]   [facility] [phone]   [facility] [phone]   [facility] [phone]   [facility] [phone]

## 2024-02-06 NOTE — WOUND OSTOMY CARE
"Consult Note - Wound   Mathew Bird 63 y.o. male MRN: [id number]  Unit/Bed#: [facility] 808-01 Encounter: [id number]        History and Present Illness:  Patient is a 64 yo male that was admitted to [facility] for treatment of acute exacerbation of COPD. Patient has a PMH of CAD, history of VTE, Crohn's disease, severe COPD, chronic hypoxic respiratory failure. Patient is a mod assist for turning and repositioning. Patient is incontinent of bowel and bladder. On assessment, patient is lying on regular mattress. On airborne and contact precautions for COVID.    Wound Care was consulted for left elbow wound     Assessment Findings:   B/L heels and sacro-buttocks are dry intact and luana with no skin loss or wounds present. Recommend preventative Hydraguard Cream and proper offloading/ repositioning.       1. POA Left Elbow Evolving Deep Tissue Pressure injury: an area of pink fragile partial thickness skin loss and dark purple intact non-blanchable tissue measured together. Patient reports that he often times lays on this elbow at home. Reports that he surprised that, \"His recliner at home does not have a hole in it from leaning on it all the time\". Elida-wound is fragile. Scant serosanguineous drainage noted. Recommend a silicone bordered foam dressing to area for Treatment. Deep Tissue Pressure Injury wounds have the potential to evolve into unstageable, stage III or stage IV wounds.      Right Elbow is intact, dry and blanches.     No induration, fluctuance, odor, warmth/temperature differences, redness, or purulence noted to the above noted wounds and skin areas assessed. New dressings applied per orders listed below. Patient tolerated well- no s/s of non-verbal pain or discomfort observed during the encounter. Bedside nurse aware of plan of care. See flow sheets for more detailed assessment findings.      Orders listed below and wound care will continue to follow, call or tiger text with questions.     Skin Care " Plan:  1-Left Elbow Wound: Cleanse with NSS and pat dry. Apply silicone bordered foam dressing (Mepilex) to wound. Byron with T for Treatment and change every other day or PRN soilage/displacement   2-Turn/reposition q2h or when medically stable for pressure re-distribution on skin .  3-Elevate heels to offload pressure.  4-Moisturize skin daily with skin nourishing cream  5-Ehob cushion in chair when out of bed.  6-Preventative Hydraguard to bilateral sacro-buttocks and heels BID and PRN.       Wounds:  Wound 02/03/24 Pressure Injury Elbow Left;Posterior (Active)   Wound Image   02/06/24 1024   Wound Description Pink;Light purple;Non-blanchable erythema 02/06/24 1024   Pressure Injury Stage DTPI 02/06/24 1024   Elida-wound Assessment Fragile 02/06/24 1024   Wound Length (cm) 8 cm 02/06/24 1024   Wound Width (cm) 7 cm 02/06/24 1024   Wound Depth (cm) 0.1 cm 02/06/24 1024   Wound Surface Area (cm^2) 56 cm^2 02/06/24 1024   Wound Volume (cm^3) 5.6 cm^3 02/06/24 1024   Calculated Wound Volume (cm^3) 5.6 cm^3 02/06/24 1024   Drainage Amount Scant 02/06/24 1024   Drainage Description Serosanguineous 02/06/24 1024   Non-staged Wound Description Partial thickness 02/06/24 1024   Treatments Cleansed;Irrigation with NSS;Site care 02/06/24 1024   Dressing Foam, Silicon (eg. Allevyn, etc) 02/06/24 1024   Dressing Changed New 02/06/24 1024   Patient Tolerance Tolerated well 02/06/24 1024   Dressing Status Clean;Dry;Intact 02/06/24 1024               Kari Paiz RN, BSN, CWOCN

## 2024-02-06 NOTE — ASSESSMENT & PLAN NOTE
Continue home sulfasalazine, he takes whenever he remembers and not exactly as prescribed  He does have chronic diarrhea  Today patient complaining of dark and also purple stools started since he is in the hospital.  No buffy blood.  Slight drop in hemoglobin noted.  Obtain stool occult blood

## 2024-02-06 NOTE — ASSESSMENT & PLAN NOTE
Follows with pulmonary as outpatient  Continues to smoke-cessation advised.  Started on Breo and Incruse by pulmonology-patient reported that he will talk to the outpatient pulmonologist before starting Breo and Incruse as outpatient

## 2024-02-07 ENCOUNTER — APPOINTMENT (INPATIENT)
Dept: RADIOLOGY | Facility: HOSPITAL | Age: 64
End: 2024-02-07
Attending: FAMILY MEDICINE
Payer: COMMERCIAL

## 2024-02-07 LAB
ANION GAP SERPL CALCULATED.3IONS-SCNC: 2 MMOL/L
BUN SERPL-MCNC: 25 MG/DL (ref 5–25)
CALCIUM SERPL-MCNC: 8.1 MG/DL (ref 8.4–10.2)
CHLORIDE SERPL-SCNC: 102 MMOL/L (ref 96–108)
CO2 SERPL-SCNC: 36 MMOL/L (ref 21–32)
CREAT SERPL-MCNC: 1.34 MG/DL (ref 0.6–1.3)
ERYTHROCYTE [DISTWIDTH] IN BLOOD BY AUTOMATED COUNT: 13.9 % (ref 11.6–15.1)
ERYTHROCYTE [DISTWIDTH] IN BLOOD BY AUTOMATED COUNT: 14 % (ref 11.6–15.1)
GFR SERPL CREATININE-BSD FRML MDRD: 56 ML/MIN/1.73SQ M
GLUCOSE SERPL-MCNC: 90 MG/DL (ref 65–140)
HCT VFR BLD AUTO: 37 % (ref 36.5–49.3)
HCT VFR BLD AUTO: 37.3 % (ref 36.5–49.3)
HGB BLD-MCNC: 11.4 G/DL (ref 12–17)
HGB BLD-MCNC: 11.7 G/DL (ref 12–17)
MCH RBC QN AUTO: 29.1 PG (ref 26.8–34.3)
MCH RBC QN AUTO: 29.6 PG (ref 26.8–34.3)
MCHC RBC AUTO-ENTMCNC: 30.8 G/DL (ref 31.4–37.4)
MCHC RBC AUTO-ENTMCNC: 31.4 G/DL (ref 31.4–37.4)
MCV RBC AUTO: 94 FL (ref 82–98)
MCV RBC AUTO: 94 FL (ref 82–98)
PLATELET # BLD AUTO: 181 THOUSANDS/UL (ref 149–390)
PLATELET # BLD AUTO: 184 THOUSANDS/UL (ref 149–390)
PMV BLD AUTO: 9.2 FL (ref 8.9–12.7)
PMV BLD AUTO: 9.3 FL (ref 8.9–12.7)
POTASSIUM SERPL-SCNC: 4.3 MMOL/L (ref 3.5–5.3)
RBC # BLD AUTO: 3.92 MILLION/UL (ref 3.88–5.62)
RBC # BLD AUTO: 3.95 MILLION/UL (ref 3.88–5.62)
SODIUM SERPL-SCNC: 140 MMOL/L (ref 135–147)
WBC # BLD AUTO: 8.26 THOUSAND/UL (ref 4.31–10.16)
WBC # BLD AUTO: 9.99 THOUSAND/UL (ref 4.31–10.16)

## 2024-02-07 PROCEDURE — 71045 X-RAY EXAM CHEST 1 VIEW: CPT

## 2024-02-07 PROCEDURE — 94640 AIRWAY INHALATION TREATMENT: CPT

## 2024-02-07 PROCEDURE — C9113 INJ PANTOPRAZOLE SODIUM, VIA: HCPCS | Performed by: FAMILY MEDICINE

## 2024-02-07 PROCEDURE — 94664 DEMO&/EVAL PT USE INHALER: CPT

## 2024-02-07 PROCEDURE — 83993 ASSAY FOR CALPROTECTIN FECAL: CPT | Performed by: STUDENT IN AN ORGANIZED HEALTH CARE EDUCATION/TRAINING PROGRAM

## 2024-02-07 PROCEDURE — 85027 COMPLETE CBC AUTOMATED: CPT | Performed by: FAMILY MEDICINE

## 2024-02-07 PROCEDURE — 94760 N-INVAS EAR/PLS OXIMETRY 1: CPT

## 2024-02-07 PROCEDURE — 99254 IP/OBS CNSLTJ NEW/EST MOD 60: CPT | Performed by: INTERNAL MEDICINE

## 2024-02-07 PROCEDURE — 80048 BASIC METABOLIC PNL TOTAL CA: CPT | Performed by: FAMILY MEDICINE

## 2024-02-07 PROCEDURE — 86140 C-REACTIVE PROTEIN: CPT | Performed by: STUDENT IN AN ORGANIZED HEALTH CARE EDUCATION/TRAINING PROGRAM

## 2024-02-07 PROCEDURE — 99232 SBSQ HOSP IP/OBS MODERATE 35: CPT | Performed by: FAMILY MEDICINE

## 2024-02-07 RX ORDER — METHYLPREDNISOLONE SODIUM SUCCINATE 40 MG/ML
40 INJECTION, POWDER, LYOPHILIZED, FOR SOLUTION INTRAMUSCULAR; INTRAVENOUS EVERY 12 HOURS SCHEDULED
Status: DISCONTINUED | OUTPATIENT
Start: 2024-02-07 | End: 2024-02-09 | Stop reason: HOSPADM

## 2024-02-07 RX ORDER — PANTOPRAZOLE SODIUM 40 MG/10ML
40 INJECTION, POWDER, LYOPHILIZED, FOR SOLUTION INTRAVENOUS EVERY 12 HOURS SCHEDULED
Status: DISCONTINUED | OUTPATIENT
Start: 2024-02-07 | End: 2024-02-09 | Stop reason: HOSPADM

## 2024-02-07 RX ORDER — METHYLPREDNISOLONE SODIUM SUCCINATE 40 MG/ML
40 INJECTION, POWDER, LYOPHILIZED, FOR SOLUTION INTRAMUSCULAR; INTRAVENOUS DAILY
Status: DISCONTINUED | OUTPATIENT
Start: 2024-02-08 | End: 2024-02-07

## 2024-02-07 RX ADMIN — FLUTICASONE FUROATE AND VILANTEROL TRIFENATATE 1 PUFF: 100; 25 POWDER RESPIRATORY (INHALATION) at 08:38

## 2024-02-07 RX ADMIN — FAMOTIDINE 20 MG: 20 TABLET, FILM COATED ORAL at 08:40

## 2024-02-07 RX ADMIN — DEXAMETHASONE 6 MG: 4 TABLET ORAL at 08:39

## 2024-02-07 RX ADMIN — METHYLPREDNISOLONE SODIUM SUCCINATE 40 MG: 40 INJECTION, POWDER, FOR SOLUTION INTRAMUSCULAR; INTRAVENOUS at 22:03

## 2024-02-07 RX ADMIN — SULFASALAZINE 500 MG: 500 TABLET ORAL at 08:38

## 2024-02-07 RX ADMIN — NICOTINE 1 PATCH: 21 PATCH, EXTENDED RELEASE TRANSDERMAL at 08:36

## 2024-02-07 RX ADMIN — UMECLIDINIUM 1 PUFF: 62.5 AEROSOL, POWDER ORAL at 08:38

## 2024-02-07 RX ADMIN — Medication 1 TABLET: at 08:37

## 2024-02-07 RX ADMIN — PANTOPRAZOLE SODIUM 40 MG: 40 INJECTION, POWDER, FOR SOLUTION INTRAVENOUS at 08:47

## 2024-02-07 RX ADMIN — ASPIRIN 81 MG CHEWABLE TABLET 81 MG: 81 TABLET CHEWABLE at 22:02

## 2024-02-07 RX ADMIN — SULFASALAZINE 500 MG: 500 TABLET ORAL at 16:54

## 2024-02-07 RX ADMIN — SULFASALAZINE 500 MG: 500 TABLET ORAL at 11:42

## 2024-02-07 RX ADMIN — GUAIFENESIN 600 MG: 600 TABLET, EXTENDED RELEASE ORAL at 08:38

## 2024-02-07 RX ADMIN — PANTOPRAZOLE SODIUM 40 MG: 40 INJECTION, POWDER, FOR SOLUTION INTRAVENOUS at 22:03

## 2024-02-07 RX ADMIN — ATORVASTATIN CALCIUM 40 MG: 40 TABLET, FILM COATED ORAL at 22:06

## 2024-02-07 RX ADMIN — SULFASALAZINE 500 MG: 500 TABLET ORAL at 22:26

## 2024-02-07 RX ADMIN — GUAIFENESIN 600 MG: 600 TABLET, EXTENDED RELEASE ORAL at 16:55

## 2024-02-07 RX ADMIN — REMDESIVIR 100 MG: 100 INJECTION, POWDER, LYOPHILIZED, FOR SOLUTION INTRAVENOUS at 22:01

## 2024-02-07 RX ADMIN — ALBUTEROL SULFATE 2.5 MG: 2.5 SOLUTION RESPIRATORY (INHALATION) at 15:01

## 2024-02-07 NOTE — PROGRESS NOTES
[facility]  Progress Note  Name: Mathew Bird I  MRN: [id number]  Unit/Bed#: [facility] 808-01 I Date of Admission: 2/3/2024   Date of Service: 2/7/2024 I Hospital Day: 4    Assessment/Plan   COVID-19 virus infection  Assessment & Plan  Was requiring bipap on admission 2/2 COPD exacerbation  Now on his BL 4 L O2   Moderate pathway  Remdesivir-finish the course  IV solumedrol for COPD-transitioned to dexamethasone.  Plan is to finish 10-day course  O2 at BL will hold off on baricitinib  Procalcitonin, CRP, ddimer  Procal negative  D dimer 0.52 -->0.45. Given he is now on his baseline oxygen req of 4L will continue lovenox VTE ppx dosing      VT (ventricular tachycardia) ([facility])  Assessment & Plan  H/o VT in past  Cont toprol XL  Cont tele monitoring    Acute on chronic respiratory failure with hypoxia ([facility])  Assessment & Plan  Baseline on 4 L NC, required Bipap on admission   Management as above  Patient noted to be more tachypneic today will repeat chest x-ray    Cigarette nicotine dependence without complication  Assessment & Plan  Continues to smoke 2 to 3 cigars daily, has been unable to cut down  Ordered nicotine patch    Dyslipidemia  Assessment & Plan  Continue statin    Stage 4 very severe COPD by GOLD classification ([facility])  Assessment & Plan  Follows with pulmonary as outpatient  Continues to smoke-cessation advised.  Started on Breo and Incruse by pulmonology-patient reported that he will talk to the outpatient pulmonologist before starting Breo and Incruse as outpatient    Coronary artery disease involving native coronary artery of native heart without angina pectoris  Assessment & Plan  History of PCI to RCA  Continue aspirin statin beta-blocker    Crohn disease ([facility])  Assessment & Plan  Continue home sulfasalazine, he takes whenever he remembers and not exactly as prescribed  He does have chronic diarrhea  Today patient complaining of dark and also purple stools started  since he is in the hospital.  No buffy blood.  Slight drop in hemoglobin noted.  Obtain stool occult blood  Hemoccult positive.  Consulted GI.  Outpatient follow-up with GI.  Monitor hemoglobin and stool    * Acute exacerbation of chronic obstructive pulmonary disease (COPD) ([facility])  Assessment & Plan  Precipitated by COVID infection. Severe exacerbation, was in tripod position with saturation in 70s upon EMS arrival, status post multiple neb treatments, IV steroids by EMS, upon arrival to ED still in distress hence was placed on BiPAP.    IV mag and azithromycin  Now on BL 4 L O2 - Baseline  Solumderol 40 TID transition today to dexamethasone 6 mg daily  Xopenex/atrovent TID  Pulmicort BID  Performist BID  Pulmonology Consulted  Home regimen: Breztri daily & albuterol INH & neb prn  Patient is more tachypneic today.  Will get chest x-ray               VTE Pharmacologic Prophylaxis:   Moderate Risk (Score 3-4) - Pharmacological DVT Prophylaxis Ordered: enoxaparin (Lovenox).    Mobility:   Basic Mobility Inpatient Raw Score: 17  -[facility] Goal: 5: Stand one or more mins  -[facility] Achieved: 6: Walk 10 steps or more      Patient Centered Rounds: I performed bedside rounds with nursing staff today.   Discussions with Specialists or Other Care Team Provider:     Education and Discussions with Family / Patient: Patient declined call to .     Total Time Spent on Date of Encounter in care of patient: 35 mins. This time was spent on one or more of the following: performing physical exam; counseling and coordination of care; obtaining or reviewing history; documenting in the medical record; reviewing/ordering tests, medications or procedures; communicating with other healthcare professionals and discussing with patient's family/caregivers.    Current Length of Stay: 4 day(s)  Current Patient Status: Inpatient   Certification Statement: The patient will continue to require additional inpatient hospital stay due to  monitoring hemoglobin and respiratory status  Discharge Plan: Anticipate discharge tomorrow to home with home services.    Code Status: Level 1 - Full Code    Subjective:   Patient seen and examined.  Hemoccult came back positive.  GI evaluation appreciated.  Patient with mild tachypnea noted.  Patient reported that he does not feel short of breath.  Patient still smokes.    Objective:     Vitals:   Temp (24hrs), Av.1 °F (37.3 °C), Min:98.8 °F (37.1 °C), Max:99.6 °F (37.6 °C)    Temp:  [98.8 °F (37.1 °C)-99.6 °F (37.6 °C)] 99.6 °F (37.6 °C)  HR:  [] 94  Resp:  [16-20] 16  BP: ()/(57-64) 94/60  SpO2:  [95 %-96 %] 95 %  Body mass index is 27.44 kg/m².     Input and Output Summary (last 24 hours):     Intake/Output Summary (Last 24 hours) at 2024 1843  Last data filed at 2024 1834  Gross per 24 hour   Intake 510 ml   Output 1100 ml   Net -590 ml       Physical Exam:   Physical Exam  Constitutional:       General: He is not in acute distress.  HENT:      Head: Normocephalic.      Nose: Nose normal.   Eyes:      General: No scleral icterus.  Cardiovascular:      Rate and Rhythm: Normal rate and regular rhythm.      Heart sounds: No murmur heard.  Pulmonary:      Breath sounds: Wheezing present.      Comments: Decreased breath sounds bilateral.  Tachypnea noted  Abdominal:      General: Bowel sounds are normal. There is no distension.   Musculoskeletal:         General: Normal range of motion.   Skin:     General: Skin is warm.      Coloration: Skin is not jaundiced.   Neurological:      Mental Status: He is alert. Mental status is at baseline.          Additional Data:     Labs:  Results from last 7 days   Lab Units 24  0844 24  0519   WBC Thousand/uL 9.99 10.70*   HEMOGLOBIN g/dL 11.7* 11.3*   HEMATOCRIT % 37.3 36.9   PLATELETS Thousands/uL 184 185   NEUTROS PCT %  --  77*   LYMPHS PCT %  --  12*   MONOS PCT %  --  10   EOS PCT %  --  0     Results from last 7 days   Lab Units  02/07/24  0844 02/06/24  0519   SODIUM mmol/L 140 141   POTASSIUM mmol/L 4.3 4.4   CHLORIDE mmol/L 102 107   CO2 mmol/L 36* 31   BUN mg/dL 25 32*   CREATININE mg/dL 1.34* 1.44*   ANION GAP mmol/L 2 3   CALCIUM mg/dL 8.1* 7.7*   ALBUMIN g/dL  --  3.0*   TOTAL BILIRUBIN mg/dL  --  0.21   ALK PHOS U/L  --  66   ALT U/L  --  16   AST U/L  --  11*   GLUCOSE RANDOM mg/dL 90 136                 Results from last 7 days   Lab Units 02/04/24  0355 02/03/24  1551   PROCALCITONIN ng/ml 0.12 0.12       Lines/Drains:  Invasive Devices       Peripheral Intravenous Line  Duration             Peripheral IV 02/04/24 Right;Ventral (anterior) Forearm 3 days                          Imaging: Reviewed radiology reports from this admission including: chest xray    Recent Cultures (last 7 days):         Last 24 Hours Medication List:   Current Facility-Administered Medications   Medication Dose Route Frequency Provider Last Rate    acetaminophen  650 mg Oral Q6H PRN Cierra Charles MD      albuterol  2 puff Inhalation Q2H PRN Cierra Charles MD      albuterol  2.5 mg Nebulization Q6H PRN Cierra Charles MD      aspirin  81 mg Oral Daily Cierra Charles MD      atorvastatin  40 mg Oral Daily With Dinner Cierra Charles MD      dexamethasone  6 mg Oral Daily Navi Ferris DO      enoxaparin  40 mg Subcutaneous Daily Cierra Charles MD      famotidine  20 mg Oral Daily Cierra Charles MD      Fluticasone Furoate-Vilanterol  1 puff Inhalation Daily Jimenez Hinton MD      guaiFENesin  600 mg Oral BID Cierra Charles MD      metoprolol succinate  25 mg Oral HS Cierra Charles MD      multivitamin-minerals  1 tablet Oral Daily Cierra Charles MD      nicotine  1 patch Transdermal Daily Cierra Charles MD      pantoprazole  40 mg Intravenous Q12H JUVENTINO Nguyen MD      remdesivir  100 mg Intravenous Q24H Cierra Charles MD Stopped (02/06/24 2214)    sodium chloride  1 spray Each Nare Q1H PRN Monik GRAYSON  MD Carol      sulfaSALAzine  500 mg Oral 4x Daily Cierra Charles MD      umeclidinium  1 puff Inhalation Daily Jimenez Hinton MD          Today, Patient Was Seen By: Felisa Nguyen MD    **Please Note: This note may have been constructed using a voice recognition system.**

## 2024-02-07 NOTE — CASE MANAGEMENT
Case Management Assessment & Discharge Planning Note    Patient name Mathew Bird  Location [facility] 808/[facility] 808-01 MRN [id number]  : 1960 Date 2024       Current Admission Date: 2/3/2024  Current Admission Diagnosis:Acute exacerbation of chronic obstructive pulmonary disease (COPD) ([facility])   Patient Active Problem List    Diagnosis Date Noted    COVID-19 virus infection 2024    Acute exacerbation of chronic obstructive pulmonary disease (COPD) ([facility]) 2024    VT (ventricular tachycardia) ([facility]) 2023    Bladder wall thickening 09/15/2023    Multiple pulmonary nodules 2023    Acute on chronic respiratory failure with hypoxia ([facility]) 2023    End stage COPD ([facility]) 2023    Prediabetes 2023    Abnormal liver function 2023    Status post reversal of ileostomy 10/25/2018    Presence of drug-eluting stent in right coronary artery 2018    Paroxysmal atrial fibrillation ([facility]) 2018    Macrocytic anemia 2018    Moderate protein-calorie malnutrition ([facility]) 2018    Alcohol abuse 05/15/2018    Cigarette nicotine dependence without complication 05/15/2018    Stage 4 very severe COPD by GOLD classification ([facility]) 2017    Spinal stenosis of cervical region 2016    Pericarditis 2016    Coronary artery disease involving native coronary artery of native heart without angina pectoris 2016    Crohn disease ([facility]) 10/22/2016    Atherosclerosis of coronary artery 2016    Dyslipidemia 10/19/2013      LOS (days): 4  Geometric Mean LOS (GMLOS) (days):   Days to GMLOS:     OBJECTIVE:    Risk of Unplanned Readmission Score: 19.48         Current admission status: Inpatient       Preferred Pharmacy:   [id number] - [place], [place] - [address] [place] [address]  [place] [address]  Phone: [phone] Fax: [phone]    Primary Care Provider: Mariola Doyle MD    Primary Insurance: [employer]  Secondary Insurance:      ASSESSMENT:  Active Health Care Proxies    There are no active Health Care Proxies on file.       Advance Directives  Does patient have a Health Care POA?: Yes  Does patient have Advance Directives?: Yes  Advance Directives: Living will  Primary Contact: enaizabel Christensen              Patient Information  Admitted from:: Home  Mental Status: Alert  Assessment information provided by:: Patient  Primary Caregiver: Self  Support Systems: Daughter, Family members  Home entry access options. Select all that apply.: No steps to enter home  Type of Current Residence: 3 story home  Upon entering residence, is there a bedroom on the main floor (no further steps)?: Yes  Upon entering residence, is there a bathroom on the main floor (no further steps)?: No  Indicate which floors of current residence have a bathroom (select all the apply):: 2nd Floor  Number of steps to 2nd floor from main floor: One Flight  Living Arrangements: Lives w/ Daughter, Lives w/ Extended Family    Activities of Daily Living Prior to Admission  Functional Status: Independent  Completes ADLs independently?: Yes  Ambulates independently?: Yes  Does patient use assisted devices?: Yes  Assisted Devices (DME) used: Straight Cane  Does patient currently own DME?: Yes  What DME does the patient currently own?: Portable Oxygen concentrator, Walker, Straight Cane  Does patient have a history of Outpatient Therapy (PT/OT)?: No  Does the patient have a history of Short-Term Rehab?: No  Does patient have a history of HHC?: No         Patient Information Continued  Income Source: Employed  Does patient have prescription coverage?: Yes  Does patient have a history of substance abuse?: Yes  Historical substance use preference: Alcohol/ETOH  Is patient currently in treatment for substance abuse?: N/A - sober  Does patient have a history of Mental Health Diagnosis?: No         Means of Transportation  Means of Transport to [facility]:: Drives Self      Housing Stability:  Unknown (4/26/2023)    Housing Stability Vital Sign     Unable to Pay for Housing in the Last Year: No     Number of Places Lived in the Last Year: Not on file     Unstable Housing in the Last Year: No   Food Insecurity: No Food Insecurity (4/26/2023)    Hunger Vital Sign     Worried About Running Out of Food in the Last Year: Never true     Ran Out of Food in the Last Year: Never true   Transportation Needs: No Transportation Needs (4/26/2023)    PRAPARE - Transportation     Lack of Transportation (Medical): No     Lack of Transportation (Non-Medical): No   Utilities: Not on file       DISCHARGE DETAILS:    Discharge planning discussed with:: pt  Freedom of Choice: Yes                   Contacts  Patient Contacts: Amparo Adame, daughter  Relationship to Patient:: Family  Contact Method: Phone  Phone Number: [phone]  Reason/Outcome: Continuity of Care, Emergency Contact, Discharge Planning                   Would you like to participate in our [facility] service program?  : No - Declined    Treatment Team Recommendation: Home                                            Additional Comments: resides with ARI ramos PTA

## 2024-02-07 NOTE — ASSESSMENT & PLAN NOTE
Continue home sulfasalazine, he takes whenever he remembers and not exactly as prescribed  He does have chronic diarrhea  Today patient complaining of dark and also purple stools started since he is in the hospital.  No buffy blood.  Slight drop in hemoglobin noted.  Obtain stool occult blood  Hemoccult positive.  Consulted GI.  Outpatient follow-up with GI.  Monitor hemoglobin and stool

## 2024-02-07 NOTE — CONSULTS
Consultation -  [facility]  Patient: Mathew Bird 63 y.o. male   MRN: [id number]  PCP: Mariola Doyle MD  Unit/Bed#: [facility] 808-01 Encounter: [id number]  Length of Stay: 4 days        Inpatient consult to gastroenterology     Date/Time  2/3/2024 3:42 PM     Performed by  Sherwin Rod MD   Authorized by  Felisa Nguyen MD           Assessment/Plan:  Mathew Bird is a 63 y.o. male with a PMH of JOSE ELIAS/MDD, asthma, CAD, COPD, GERD, HFrEF, HTN, HLD, Crohn's s/p ileocolectomy 2018, who presents w/ SOB, fth COVID pna on steroids. GI csxed regarding possible melena.    # COVID pna  # Crohn's colitis s/p ileal resection and R hemocolectomy 2018  # GERD  # ?maroon stools   - pending improvement of respiratory status and resolution of COVID. No indication sfor endoscopy currently   - will arrange for f/u w/ IBD to discuss EGD and colonoscopy for surveillance and monitoring of disease. Has an appointment at the [place] office on 02/12 to re-establish care   - check stool studies and prebiologic workup   - monitor BMs and Hb.   - c/w steroids on for COVID pna, which may be helping w/ any bowel inflammation   - c/w PPI on steroids    History of Present Illness:  Mathew Bird is a 63 y.o. male with a PMH of JOSE ELIAS/MDD, asthma, CAD, COPD, GERD, HFrEF, HTN, HLD, Crohn's s/p ileocolectomy 2018, who presents w/ SOB, fth COVID pna on steroids. GI csxed regarding possible maroon stools.    Previously following w/ [facility]. Colonoscopy back in 2016 revealed pancolonic inflammation. Was started on steroids and discharged. Has had intermittent follow-up but his disease course was complicated in 2018 where repeat colonoscopy showed continued pancolonic disease and pseudopolyposis. Then had a transverse colonic perf and underwent emergent ileocolectomy w/ ileostomy placement. Had multiple complications w/ his ileostomy that was eventually reversed in late 2018. Otherwise hasn't been seen by GI since. Was getting  sulfasalazine refills through his PCP. Since then he has been having loose BMs, 3-4 per day, not outright watery but not solid. Has not had a colonoscopy since his last abdominal Sx. Had changes in insurance and is due to re-establish care next week.     Regarding these new findings of ?maroon stools, he reports having several episodes of stools that look like cranberry jam for the past few days. No abdominal pain that he complains of. Denies any melena, hematochezia. Weight has been stable recently. Breathing improved on rem + dex.    FHx of Crohn's in dad    10/24/16 EGD/colon: GIM, Bx neg for Hpy, non-caseating granuloma  cobblestoning and friable mucosa noted throughout the colon, Bxed.   - TI wnl, ascending mild-mod inflam, transverse mod-severe colitis, descending mild colitis, rectum mod colitis  02/06/18: R colitis, transverse neg, left neg, rectal neg  05/22/18 transverse colon perf s/p ileal resection and R hemicoleCtomy resection w/ diverting ileostomy  10/05/18 ileostomy closure  Sulfasalazine q6h    GI HPI NEGATIVES:  Denies any recent N/V/D/C, hematemesis, heartburn, dysphagia, odynophagia, abdominal pain, hematochezia, melena, stool incontinence, jaundice, pruritis, anorexia, weight loss, weakness, or fatigue.    REVIEW OF SYSTEMS:  Otherwise, pt denies any fevers/chills, lightheadedness, dizziness, CP, SOB, urinary symptoms, weakness/numbness/tingling.    Past Medical History:   Diagnosis Date    Anxiety     Asthma     Cardiac disease     Cervical stenosis (uterine cervix)     Chest pain     Chronic kidney disease     Colitis     Colon polyps     COPD (chronic obstructive pulmonary disease) (HCC)     2L @ HS and PRN    Coronary artery disease     Diverticulitis     Esophageal reflux     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HCC)     Heart failure, systolic, due to idiopathic cardiomyopathy (HCC) 5/21/2018    Hyperlipidemia     Hypertension     IBD (inflammatory bowel disease)     Myocardial infarction (HCC)      Old myocardial infarction     Paroxysmal atrial fibrillation (HCC) 8/24/2018    Perforation of colon (HCC)     Type 2 diabetes, diet controlled (HCC)     Ulcerative colitis (HCC)      Past Surgical History:   Procedure Laterality Date    ANGIOPLASTY      APPENDECTOMY      COLON SURGERY      COLONOSCOPY N/A 10/24/2016    Procedure: COLONOSCOPY;  Surgeon: Tirso Juárez MD;  Location: [facility];  Service:     CORONARY ANGIOPLASTY WITH STENT PLACEMENT      ESOPHAGOGASTRODUODENOSCOPY N/A 10/24/2016    Procedure: ESOPHAGOGASTRODUODENOSCOPY (EGD);  Surgeon: Tirso Juárez MD;  Location: [facility];  Service:     EXPLORATORY LAPAROTOMY W/ BOWEL RESECTION N/A 5/22/2018    Procedure: EXPLORATORY LAPAROTOMY, ILIOCOLECTOMY, ILIOCOLONIC ANASTAMOSIS, LOOP ILIOSTOMY, REPAIR OF SEROSAL TEAR, EXTENSIVE LYSIS OF ADHESIONS, WOUND VAC PLACEMENT;  Surgeon: Tirso Juárez MD;  Location: [facility];  Service: Colorectal    HEMICOLECTOMY      ILEOSTOMY CLOSURE N/A 10/5/2018    Procedure: CLOSURE ILEOSTOMY;  Surgeon: Tirso Juárez MD;  Location: [facility];  Service: Colorectal    OTHER SURGICAL HISTORY      stent indications acute myocardial infarction    SD COLONOSCOPY FLX DX W/COLLJ SPEC WHEN PFRMD N/A 2/6/2018    Procedure: COLONOSCOPY;  Surgeon: Tirso Juárez MD;  Location: [facility];  Service: Colorectal    SD COLONOSCOPY FLX DX W/COLLJ SPEC WHEN PFRMD N/A 10/4/2018    Procedure: COLONOSCOPY;  Surgeon: Tirso Juárez MD;  Location: [facility];  Service: Colorectal    TONSILLECTOMY       Prior to Admission medications    Medication Sig Start Date End Date Taking? Authorizing Provider   albuterol (2.5 mg/3 mL) 0.083 % nebulizer solution TAKE 3 ML (2.5 MG TOTAL) BY NEBULIZATION EVERY 6 HOURS AS NEEDED FOR WHEEZING OR SHORTNESS OF BREATH 1/29/24  Yes Nat Jones, DO   albuterol (PROVENTIL HFA,VENTOLIN HFA) 90 mcg/act inhaler INHALE 2 PUFFS EVERY 6 HOURS AS NEEDED FOR WHEEZING 11/27/23  Yes Rosangela Gibbs,  "AMALIA   aspirin 81 mg chewable tablet Chew 1 tablet (81 mg total) daily 8/24/23  Yes AMALIA Wilkerson   atorvastatin (LIPITOR) 40 mg tablet Take 1 tablet (40 mg total) by mouth daily with dinner 8/24/23  Yes AMALIA Wilkerson   Budeson-Glycopyrrol-Formoterol (Breztri Aerosphere) 160-9-4.8 MCG/ACT AERO INHALE 2 PUFFS BY MOUTH TWICE A DAY RINSE MOUTH AFTER USE 12/20/23  Yes Nat Jones,    metoprolol succinate (TOPROL-XL) 25 mg 24 hr tablet Take 1 tablet (25 mg total) by mouth daily at bedtime Daily at bedtime 8/24/23  Yes AMALIA Wilkerson   Multiple Vitamins-Minerals (Centrum Silver 50+Men) TABS Take by mouth   Yes Historical Provider, MD   Nebulizers (AERONEB GO NEBULIZER HANDSET) MISC by Does not apply route 2 (two) times a day as needed (wheezing) 6/8/18   Luciano Echevarria MD   sulfaSALAzine (AZULFIDINE) 500 mg tablet Take 1 tablet (500 mg total) by mouth 4 (four) times a day 5/6/23 9/15/23  France Cole MD     Allergies   Allergen Reactions    Medical Tape     Other      Brown cloth band aids       Latex Rash     Social History     Tobacco Use    Smoking status: Every Day     Types: Cigars    Smokeless tobacco: Never    Tobacco comments:     4-5cigars per day before nowadays smokes about 1 cigar, trying to cut down.   Vaping Use    Vaping status: Never Used   Substance Use Topics    Alcohol use: Not Currently     Comment: last drink may 2023    Drug use: No     Family History   Problem Relation Age of Onset    Coronary artery disease Father     Crohn's disease Father     Stroke Father     Diabetes Family        Objective:  BP 98/64   Pulse 96   Temp 98.8 °F (37.1 °C)   Resp 16   Ht 5' 6\" (1.676 m)   Wt 77.1 kg (170 lb)   SpO2 96%   BMI 27.44 kg/m²     Intake/Output Summary (Last 24 hours) at 2/7/2024 1241  Last data filed at 2/7/2024 0901  Gross per 24 hour   Intake 510 ml   Output 1250 ml   Net -740 ml     Body mass index is 27.44 kg/m².  Physical " Exam  Constitutional:       General: He is not in acute distress.     Appearance: Normal appearance.   HENT:      Head: Normocephalic and atraumatic.      Nose: Nose normal.      Mouth/Throat:      Mouth: Mucous membranes are moist.   Eyes:      General: No scleral icterus.     Extraocular Movements: Extraocular movements intact.      Conjunctiva/sclera: Conjunctivae normal.      Pupils: Pupils are equal, round, and reactive to light.   Cardiovascular:      Rate and Rhythm: Normal rate and regular rhythm.   Pulmonary:      Effort: Pulmonary effort is normal.   Abdominal:      General: Abdomen is flat. There is no distension.      Palpations: There is no mass.      Tenderness: There is no abdominal tenderness.      Hernia: A hernia is present. Hernia is present in the umbilical area.      Comments: Old surgical scars, well-healed   Musculoskeletal:         General: No swelling. Normal range of motion.   Skin:     General: Skin is warm and dry.      Capillary Refill: Capillary refill takes less than 2 seconds.   Neurological:      General: No focal deficit present.      Mental Status: He is alert.   Psychiatric:         Mood and Affect: Mood normal.         Labs:  I have reviewed all available labs and imaging results in Epic.  Results from last 7 days   Lab Units 02/07/24  0844 02/06/24  0519 02/05/24  0550 02/04/24  0348   SODIUM mmol/L 140 141 139 137   POTASSIUM mmol/L 4.3 4.4 5.6* 4.6   CHLORIDE mmol/L 102 107 103 101   CO2 mmol/L 36* 31 31 27   BUN mg/dL 25 32* 31* 24   CREATININE mg/dL 1.34* 1.44* 1.42* 1.47*   GLUCOSE RANDOM mg/dL 90 136 147* 188*   CALCIUM mg/dL 8.1* 7.7* 8.3* 8.7   ALBUMIN g/dL  --  3.0* 3.4* 3.8   ALK PHOS U/L  --  66 86 108*   ALT U/L  --  16 25 35   AST U/L  --  11* 16 19   EGFR ml/min/1.73sq m 56 52 52 50     Results from last 7 days   Lab Units 02/07/24  0844 02/06/24  0519 02/05/24  0550 02/04/24  0514   WBC Thousand/uL 9.99 10.70* 11.86* 7.38   HEMOGLOBIN g/dL 11.7* 11.3* 12.8 14.4    HEMATOCRIT % 37.3 36.9 41.1 44.9   PLATELETS Thousands/uL 184 185 197 191   NEUTROS PCT %  --  77* 88* 90*   LYMPHS PCT %  --  12* 5* 8*   MONOS PCT %  --  10 6 1*   EOS PCT %  --  0 0 0   BASOS PCT %  --  0 0 0   NEUTROS ABS Thousands/µL  --  8.29* 10.47* 6.66   LYMPHS ABS Thousands/µL  --  1.30 0.61 0.55*   MONOS ABS Thousand/µL  --  1.02 0.68 0.10*   EOS ABS Thousand/µL  --  0.01 0.00 0.00   BASOS ABS Thousands/µL  --  0.01 0.01 0.01         Results from last 7 days   Lab Units 02/03/24  1551   CK TOTAL U/L 825*   BNP pg/mL 133*       Additional Labs:                 Imaging:  XR chest 1 view portable   Final Result by Bouchra Gtz MD (02/04 1107)      No acute cardiopulmonary disease.            Workstation performed: [id number]             Medications:   Current Facility-Administered Medications   Medication Dose Route Frequency Provider Last Rate    acetaminophen  650 mg Oral Q6H PRN Cierra Charles MD      albuterol  2 puff Inhalation Q2H PRN Cierra Charles MD      albuterol  2.5 mg Nebulization Q6H PRN Cierra Charles MD      aspirin  81 mg Oral Daily Cierra Charles MD      atorvastatin  40 mg Oral Daily With Dinner Cierra Charles MD      dexamethasone  6 mg Oral Daily Navi Ferris DO      enoxaparin  40 mg Subcutaneous Daily Cierra Charles MD      famotidine  20 mg Oral Daily Cierra Charles MD      Fluticasone Furoate-Vilanterol  1 puff Inhalation Daily Jimenez Hinton MD      guaiFENesin  600 mg Oral BID Cierra Charles MD      metoprolol succinate  25 mg Oral HS Cierra Charles MD      multivitamin-minerals  1 tablet Oral Daily Cierra Charles MD      nicotine  1 patch Transdermal Daily Cierra Charles MD      pantoprazole  40 mg Intravenous Q12H JUVENTINO Nguyen MD      remdesivir  100 mg Intravenous Q24H Cierra Charles MD Stopped (02/06/24 4324)    sodium chloride  1 spray Each Nare Q1H PRN Monik Medina MD      sulfaSALAzine  500 mg Oral  4x Daily Cierra Charles MD      umeclidinium  1 puff Inhalation Daily Jimenez Hinton MD         Problem List:  Patient Active Problem List   Diagnosis    Crohn disease (HCC)    Spinal stenosis of cervical region    Pericarditis    Coronary artery disease involving native coronary artery of native heart without angina pectoris    Atherosclerosis of coronary artery    Stage 4 very severe COPD by GOLD classification (HCC)    Dyslipidemia    Alcohol abuse    Cigarette nicotine dependence without complication    Moderate protein-calorie malnutrition (HCC)    Macrocytic anemia    Presence of drug-eluting stent in right coronary artery    Paroxysmal atrial fibrillation (HCC)    Status post reversal of ileostomy    Acute on chronic respiratory failure with hypoxia (HCC)    End stage COPD (HCC)    Prediabetes    Abnormal liver function    Multiple pulmonary nodules    Bladder wall thickening    VT (ventricular tachycardia) (HCC)    COVID-19 virus infection    Acute exacerbation of chronic obstructive pulmonary disease (COPD) (HCC)       Case discussed with attending physician Dr. Bolaños. All recs are preliminary pending final attestation.    Sherwin Rod, PGY-4

## 2024-02-07 NOTE — ASSESSMENT & PLAN NOTE
Precipitated by COVID infection. Severe exacerbation, was in tripod position with saturation in 70s upon EMS arrival, status post multiple neb treatments, IV steroids by EMS, upon arrival to ED still in distress hence was placed on BiPAP.    IV mag and azithromycin  Now on BL 4 L O2 - Baseline  Solumderol 40 TID transition today to dexamethasone 6 mg daily  Xopenex/atrovent TID  Pulmicort BID  Performist BID  Pulmonology Consulted  Home regimen: Breztri daily & albuterol INH & neb prn  Patient is more tachypneic today.  Will get chest x-ray

## 2024-02-07 NOTE — ASSESSMENT & PLAN NOTE
Baseline on 4 L NC, required Bipap on admission   Management as above  Patient noted to be more tachypneic today will repeat chest x-ray

## 2024-02-08 LAB
ANION GAP SERPL CALCULATED.3IONS-SCNC: 2 MMOL/L
BUN SERPL-MCNC: 24 MG/DL (ref 5–25)
CALCIUM SERPL-MCNC: 8.1 MG/DL (ref 8.4–10.2)
CHLORIDE SERPL-SCNC: 100 MMOL/L (ref 96–108)
CO2 SERPL-SCNC: 36 MMOL/L (ref 21–32)
CREAT SERPL-MCNC: 1.41 MG/DL (ref 0.6–1.3)
CRP SERPL QL: 12.5 MG/L
ERYTHROCYTE [DISTWIDTH] IN BLOOD BY AUTOMATED COUNT: 13.6 % (ref 11.6–15.1)
GFR SERPL CREATININE-BSD FRML MDRD: 53 ML/MIN/1.73SQ M
GLUCOSE SERPL-MCNC: 119 MG/DL (ref 65–140)
HBV CORE AB SER QL: NORMAL
HBV CORE IGM SER QL: NORMAL
HBV SURFACE AG SER QL: NORMAL
HCT VFR BLD AUTO: 34.9 % (ref 36.5–49.3)
HCV AB SER QL: NORMAL
HGB BLD-MCNC: 11 G/DL (ref 12–17)
MCH RBC QN AUTO: 29.3 PG (ref 26.8–34.3)
MCHC RBC AUTO-ENTMCNC: 31.5 G/DL (ref 31.4–37.4)
MCV RBC AUTO: 93 FL (ref 82–98)
PLATELET # BLD AUTO: 170 THOUSANDS/UL (ref 149–390)
PMV BLD AUTO: 9.4 FL (ref 8.9–12.7)
POTASSIUM SERPL-SCNC: 4.6 MMOL/L (ref 3.5–5.3)
RBC # BLD AUTO: 3.75 MILLION/UL (ref 3.88–5.62)
SODIUM SERPL-SCNC: 138 MMOL/L (ref 135–147)
WBC # BLD AUTO: 10.5 THOUSAND/UL (ref 4.31–10.16)

## 2024-02-08 PROCEDURE — 87340 HEPATITIS B SURFACE AG IA: CPT | Performed by: STUDENT IN AN ORGANIZED HEALTH CARE EDUCATION/TRAINING PROGRAM

## 2024-02-08 PROCEDURE — 86480 TB TEST CELL IMMUN MEASURE: CPT | Performed by: STUDENT IN AN ORGANIZED HEALTH CARE EDUCATION/TRAINING PROGRAM

## 2024-02-08 PROCEDURE — 94760 N-INVAS EAR/PLS OXIMETRY 1: CPT

## 2024-02-08 PROCEDURE — 86803 HEPATITIS C AB TEST: CPT | Performed by: STUDENT IN AN ORGANIZED HEALTH CARE EDUCATION/TRAINING PROGRAM

## 2024-02-08 PROCEDURE — 94640 AIRWAY INHALATION TREATMENT: CPT

## 2024-02-08 PROCEDURE — C9113 INJ PANTOPRAZOLE SODIUM, VIA: HCPCS | Performed by: FAMILY MEDICINE

## 2024-02-08 PROCEDURE — 86705 HEP B CORE ANTIBODY IGM: CPT | Performed by: STUDENT IN AN ORGANIZED HEALTH CARE EDUCATION/TRAINING PROGRAM

## 2024-02-08 PROCEDURE — 86704 HEP B CORE ANTIBODY TOTAL: CPT | Performed by: STUDENT IN AN ORGANIZED HEALTH CARE EDUCATION/TRAINING PROGRAM

## 2024-02-08 PROCEDURE — 85027 COMPLETE CBC AUTOMATED: CPT | Performed by: FAMILY MEDICINE

## 2024-02-08 PROCEDURE — 80048 BASIC METABOLIC PNL TOTAL CA: CPT | Performed by: FAMILY MEDICINE

## 2024-02-08 PROCEDURE — 99232 SBSQ HOSP IP/OBS MODERATE 35: CPT | Performed by: FAMILY MEDICINE

## 2024-02-08 RX ORDER — ALBUTEROL SULFATE 2.5 MG/3ML
2.5 SOLUTION RESPIRATORY (INHALATION) EVERY 6 HOURS PRN
Status: DISCONTINUED | OUTPATIENT
Start: 2024-02-08 | End: 2024-02-09 | Stop reason: HOSPADM

## 2024-02-08 RX ORDER — LEVALBUTEROL INHALATION SOLUTION 1.25 MG/3ML
1.25 SOLUTION RESPIRATORY (INHALATION) EVERY 8 HOURS PRN
Status: DISCONTINUED | OUTPATIENT
Start: 2024-02-08 | End: 2024-02-08

## 2024-02-08 RX ADMIN — Medication 1 TABLET: at 09:15

## 2024-02-08 RX ADMIN — METOPROLOL SUCCINATE 25 MG: 25 TABLET, FILM COATED, EXTENDED RELEASE ORAL at 23:08

## 2024-02-08 RX ADMIN — PANTOPRAZOLE SODIUM 40 MG: 40 INJECTION, POWDER, FOR SOLUTION INTRAVENOUS at 21:05

## 2024-02-08 RX ADMIN — PANTOPRAZOLE SODIUM 40 MG: 40 INJECTION, POWDER, FOR SOLUTION INTRAVENOUS at 09:14

## 2024-02-08 RX ADMIN — ALBUTEROL SULFATE 2.5 MG: 2.5 SOLUTION RESPIRATORY (INHALATION) at 14:53

## 2024-02-08 RX ADMIN — METHYLPREDNISOLONE SODIUM SUCCINATE 40 MG: 40 INJECTION, POWDER, FOR SOLUTION INTRAMUSCULAR; INTRAVENOUS at 09:15

## 2024-02-08 RX ADMIN — ASPIRIN 81 MG CHEWABLE TABLET 81 MG: 81 TABLET CHEWABLE at 21:05

## 2024-02-08 RX ADMIN — SULFASALAZINE 500 MG: 500 TABLET ORAL at 18:39

## 2024-02-08 RX ADMIN — GUAIFENESIN 600 MG: 600 TABLET, EXTENDED RELEASE ORAL at 09:15

## 2024-02-08 RX ADMIN — NICOTINE 1 PATCH: 21 PATCH, EXTENDED RELEASE TRANSDERMAL at 09:17

## 2024-02-08 RX ADMIN — SULFASALAZINE 500 MG: 500 TABLET ORAL at 09:16

## 2024-02-08 RX ADMIN — ATORVASTATIN CALCIUM 40 MG: 40 TABLET, FILM COATED ORAL at 21:05

## 2024-02-08 RX ADMIN — SULFASALAZINE 500 MG: 500 TABLET ORAL at 11:49

## 2024-02-08 RX ADMIN — FAMOTIDINE 20 MG: 20 TABLET, FILM COATED ORAL at 09:15

## 2024-02-08 RX ADMIN — ENOXAPARIN SODIUM 40 MG: 40 INJECTION SUBCUTANEOUS at 09:14

## 2024-02-08 RX ADMIN — SULFASALAZINE 500 MG: 500 TABLET ORAL at 21:05

## 2024-02-08 RX ADMIN — GUAIFENESIN 600 MG: 600 TABLET, EXTENDED RELEASE ORAL at 17:12

## 2024-02-08 RX ADMIN — METHYLPREDNISOLONE SODIUM SUCCINATE 40 MG: 40 INJECTION, POWDER, FOR SOLUTION INTRAMUSCULAR; INTRAVENOUS at 21:05

## 2024-02-08 NOTE — ASSESSMENT & PLAN NOTE
Was requiring bipap on admission 2/2 COPD exacerbation  Now on his BL 4 L O2   Moderate pathway  Remdesivir-finish the course  IV solumedrol for COPD-transitioned to dexamethasone.  Plan is to finish 10-day course  O2 at BL will hold off on baricitinib  Procalcitonin, CRP, ddimer  Procal negative  D dimer 0.52 -->0.45. Given he is now on his baseline oxygen req of 4L will continue lovenox VTE ppx dosing  Increase the dose of steroids due to wheezing and tachypnea.  Appears to be improving

## 2024-02-08 NOTE — PROGRESS NOTES
[facility]  Progress Note  Name: Mathew Bird I  MRN: [id number]  Unit/Bed#: [facility]PHP 808-01 I Date of Admission: 2/3/2024   Date of Service: 2/8/2024 I Hospital Day: 5    Assessment/Plan   COVID-19 virus infection  Assessment & Plan  Was requiring bipap on admission 2/2 COPD exacerbation  Now on his BL 4 L O2   Moderate pathway  Remdesivir-finish the course  IV solumedrol for COPD-transitioned to dexamethasone.  Plan is to finish 10-day course  O2 at BL will hold off on baricitinib  Procalcitonin, CRP, ddimer  Procal negative  D dimer 0.52 -->0.45. Given he is now on his baseline oxygen req of 4L will continue lovenox VTE ppx dosing  Increase the dose of steroids due to wheezing and tachypnea.  Appears to be improving    VT (ventricular tachycardia) ([facility])  Assessment & Plan  H/o VT in past  Cont toprol XL  Cont tele monitoring    Acute on chronic respiratory failure with hypoxia (HCC)  Assessment & Plan  Baseline on 4 L NC, required Bipap on admission   Management as above  Patient noted to be more tachypneic today will repeat chest x-ray-chest x-ray reviewed.  No acute cardiopulmonary distress.  Does not appear to be fluid overload  Currently on  higher dose of steroids    Cigarette nicotine dependence without complication  Assessment & Plan  Continues to smoke 2 to 3 cigars daily, has been unable to cut down  Ordered nicotine patch    Dyslipidemia  Assessment & Plan  Continue statin    Stage 4 very severe COPD by GOLD classification (HCC)  Assessment & Plan  Follows with pulmonary as outpatient  Continues to smoke-cessation advised.  Started on Breo and Incruse by pulmonology-patient reported that he will talk to the outpatient pulmonologist before starting Breo and Incruse as outpatient    Coronary artery disease involving native coronary artery of native heart without angina pectoris  Assessment & Plan  History of PCI to RCA  Continue aspirin statin beta-blocker    Crohn  disease (HCC)  Assessment & Plan  Continue home sulfasalazine, he takes whenever he remembers and not exactly as prescribed  He does have chronic diarrhea  Today patient complaining of dark and also purple stools started since he is in the hospital.  No buffy blood.  Slight drop in hemoglobin noted.  Obtain stool occult blood  Hemoccult positive.  Consulted GI.  Outpatient follow-up with GI.  Monitor hemoglobin and stool    * Acute exacerbation of chronic obstructive pulmonary disease (COPD) ([facility])  Assessment & Plan  Precipitated by COVID infection. Severe exacerbation, was in tripod position with saturation in 70s upon EMS arrival, status post multiple neb treatments, IV steroids by EMS, upon arrival to ED still in distress hence was placed on BiPAP.    IV mag and azithromycin  Now on BL 4 L O2 - Baseline  Solumderol 40 TID transition today to dexamethasone 6 mg daily  Xopenex/atrovent TID  Pulmicort BID  Performist BID  Pulmonology Consulted  Home regimen: Breztri daily & albuterol INH & neb prn  Patient is more tachypneic today.  Will get chest x-ray               VTE Pharmacologic Prophylaxis:   Moderate Risk (Score 3-4) - Pharmacological DVT Prophylaxis Ordered: enoxaparin (Lovenox).    Mobility:   Basic Mobility Inpatient Raw Score: 23  JH-HLM Goal: 7: Walk 25 feet or more  JH-HLM Achieved: 7: Walk 25 feet or more  HLM Goal achieved. Continue to encourage appropriate mobility.    Patient Centered Rounds: I performed bedside rounds with nursing staff today.   Discussions with Specialists or Other Care Team Provider: gi    Education and Discussions with Family / Patient: Patient declined call to .     Total Time Spent on Date of Encounter in care of patient: 35 mins. This time was spent on one or more of the following: performing physical exam; counseling and coordination of care; obtaining or reviewing history; documenting in the medical record; reviewing/ordering tests, medications or procedures;  communicating with other healthcare professionals and discussing with patient's family/caregivers.    Current Length of Stay: 5 day(s)  Current Patient Status: Inpatient   Certification Statement: The patient will continue to require additional inpatient hospital stay due to iv steroids  Discharge Plan: Anticipate discharge in 48 hrs to home.    Code Status: Level 1 - Full Code    Subjective:   Patient seen and examined.  No events overnight.  Reported that his stool is becoming lighter.  No active bleed seen.  Breathing is better today.  Plan is for discharge tomorrow    Objective:     Vitals:   Temp (24hrs), Av.8 °F (37.1 °C), Min:98.2 °F (36.8 °C), Max:99.2 °F (37.3 °C)    Temp:  [98.2 °F (36.8 °C)-99.2 °F (37.3 °C)] 98.9 °F (37.2 °C)  HR:  [69-80] 80  Resp:  [17-18] 18  BP: ()/(50-71) 116/71  SpO2:  [96 %-99 %] 99 %  Body mass index is 27.44 kg/m².     Input and Output Summary (last 24 hours):     Intake/Output Summary (Last 24 hours) at 2024 1832  Last data filed at 2024 1230  Gross per 24 hour   Intake 180 ml   Output 1400 ml   Net -1220 ml       Physical Exam:   Physical Exam  HENT:      Head: Normocephalic.      Nose: Nose normal.   Cardiovascular:      Rate and Rhythm: Normal rate.   Pulmonary:      Breath sounds: No wheezing.      Comments: Decreased breath sounds bilateral  Abdominal:      General: Abdomen is flat.   Musculoskeletal:         General: Normal range of motion.      Cervical back: Normal range of motion.   Skin:     General: Skin is warm.   Neurological:      Mental Status: He is alert. Mental status is at baseline.          Additional Data:     Labs:  Results from last 7 days   Lab Units 24  0942 24  0844 24  0519   WBC Thousand/uL 10.50*   < > 10.70*   HEMOGLOBIN g/dL 11.0*   < > 11.3*   HEMATOCRIT % 34.9*   < > 36.9   PLATELETS Thousands/uL 170   < > 185   NEUTROS PCT %  --   --  77*   LYMPHS PCT %  --   --  12*   MONOS PCT %  --   --  10   EOS PCT %  --    --  0    < > = values in this interval not displayed.     Results from last 7 days   Lab Units 02/08/24  0942 02/07/24  0844 02/06/24  0519   SODIUM mmol/L 138   < > 141   POTASSIUM mmol/L 4.6   < > 4.4   CHLORIDE mmol/L 100   < > 107   CO2 mmol/L 36*   < > 31   BUN mg/dL 24   < > 32*   CREATININE mg/dL 1.41*   < > 1.44*   ANION GAP mmol/L 2   < > 3   CALCIUM mg/dL 8.1*   < > 7.7*   ALBUMIN g/dL  --   --  3.0*   TOTAL BILIRUBIN mg/dL  --   --  0.21   ALK PHOS U/L  --   --  66   ALT U/L  --   --  16   AST U/L  --   --  11*   GLUCOSE RANDOM mg/dL 119   < > 136    < > = values in this interval not displayed.                 Results from last 7 days   Lab Units 02/04/24  0355 02/03/24  1551   PROCALCITONIN ng/ml 0.12 0.12       Lines/Drains:  Invasive Devices       Peripheral Intravenous Line  Duration             Peripheral IV 02/08/24 Right;Ventral (anterior) Forearm <1 day                          Imaging: Reviewed radiology reports from this admission including: chest xray    Recent Cultures (last 7 days):         Last 24 Hours Medication List:   Current Facility-Administered Medications   Medication Dose Route Frequency Provider Last Rate    acetaminophen  650 mg Oral Q6H PRN Cierra Charles MD      albuterol  2 puff Inhalation Q2H PRN Cierra Charles MD      albuterol  2.5 mg Nebulization Q6H PRN Felisa Nguyen MD      aspirin  81 mg Oral Daily Cierra Charles MD      atorvastatin  40 mg Oral Daily With Dinner Cierra Charles MD      enoxaparin  40 mg Subcutaneous Daily Cierra Charles MD      famotidine  20 mg Oral Daily Cierra Charles MD      Fluticasone Furoate-Vilanterol  1 puff Inhalation Daily Jimenez Hinton MD      guaiFENesin  600 mg Oral BID Cierra Charles MD      methylPREDNISolone sodium succinate  40 mg Intravenous Q12H [facility] Felisa Nguyen MD      metoprolol succinate  25 mg Oral HS Cierra Charles MD      multivitamin-minerals  1 tablet Oral Daily Cierra Charles MD       nicotine  1 patch Transdermal Daily Cierra Charles MD      pantoprazole  40 mg Intravenous Q12H JUVENTINO Felisa Nguyen MD      sodium chloride  1 spray Each Nare Q1H PRN Monik Medina MD      sulfaSALAzine  500 mg Oral 4x Daily Cierra Charles MD      umeclidinium  1 puff Inhalation Daily Jimenez Hinton MD          Today, Patient Was Seen By: Felisa Nguyen MD    **Please Note: This note may have been constructed using a voice recognition system.**

## 2024-02-08 NOTE — ASSESSMENT & PLAN NOTE
Baseline on 4 L NC, required Bipap on admission   Management as above  Patient noted to be more tachypneic today will repeat chest x-ray-chest x-ray reviewed.  No acute cardiopulmonary distress.  Does not appear to be fluid overload  Currently on  higher dose of steroids

## 2024-02-09 ENCOUNTER — TRANSITIONAL CARE MANAGEMENT (OUTPATIENT)
Dept: FAMILY MEDICINE CLINIC | Facility: CLINIC | Age: 64
End: 2024-02-09

## 2024-02-09 ENCOUNTER — TELEPHONE (OUTPATIENT)
Dept: FAMILY MEDICINE CLINIC | Facility: CLINIC | Age: 64
End: 2024-02-09

## 2024-02-09 VITALS
WEIGHT: 170 LBS | DIASTOLIC BLOOD PRESSURE: 59 MMHG | TEMPERATURE: 98.2 F | RESPIRATION RATE: 16 BRPM | OXYGEN SATURATION: 87 % | SYSTOLIC BLOOD PRESSURE: 94 MMHG | HEIGHT: 66 IN | HEART RATE: 78 BPM | BODY MASS INDEX: 27.32 KG/M2

## 2024-02-09 LAB
ANION GAP SERPL CALCULATED.3IONS-SCNC: 5 MMOL/L
BUN SERPL-MCNC: 24 MG/DL (ref 5–25)
CALCIUM SERPL-MCNC: 8 MG/DL (ref 8.4–10.2)
CHLORIDE SERPL-SCNC: 99 MMOL/L (ref 96–108)
CO2 SERPL-SCNC: 34 MMOL/L (ref 21–32)
CREAT SERPL-MCNC: 1.32 MG/DL (ref 0.6–1.3)
ERYTHROCYTE [DISTWIDTH] IN BLOOD BY AUTOMATED COUNT: 13.6 % (ref 11.6–15.1)
GAMMA INTERFERON BACKGROUND BLD IA-ACNC: <0 IU/ML
GFR SERPL CREATININE-BSD FRML MDRD: 57 ML/MIN/1.73SQ M
GLUCOSE SERPL-MCNC: 138 MG/DL (ref 65–140)
HCT VFR BLD AUTO: 34.2 % (ref 36.5–49.3)
HGB BLD-MCNC: 10.8 G/DL (ref 12–17)
M TB IFN-G BLD-IMP: ABNORMAL
M TB IFN-G CD4+ BCKGRND COR BLD-ACNC: 0 IU/ML
M TB IFN-G CD4+ BCKGRND COR BLD-ACNC: 0 IU/ML
MCH RBC QN AUTO: 29.6 PG (ref 26.8–34.3)
MCHC RBC AUTO-ENTMCNC: 31.6 G/DL (ref 31.4–37.4)
MCV RBC AUTO: 94 FL (ref 82–98)
MITOGEN IGNF BCKGRD COR BLD-ACNC: 0.42 IU/ML
PLATELET # BLD AUTO: 159 THOUSANDS/UL (ref 149–390)
PMV BLD AUTO: 9.8 FL (ref 8.9–12.7)
POTASSIUM SERPL-SCNC: 4.4 MMOL/L (ref 3.5–5.3)
RBC # BLD AUTO: 3.65 MILLION/UL (ref 3.88–5.62)
SODIUM SERPL-SCNC: 138 MMOL/L (ref 135–147)
WBC # BLD AUTO: 11.45 THOUSAND/UL (ref 4.31–10.16)

## 2024-02-09 PROCEDURE — C9113 INJ PANTOPRAZOLE SODIUM, VIA: HCPCS | Performed by: FAMILY MEDICINE

## 2024-02-09 PROCEDURE — 80048 BASIC METABOLIC PNL TOTAL CA: CPT | Performed by: FAMILY MEDICINE

## 2024-02-09 PROCEDURE — 99239 HOSP IP/OBS DSCHRG MGMT >30: CPT | Performed by: FAMILY MEDICINE

## 2024-02-09 PROCEDURE — 85027 COMPLETE CBC AUTOMATED: CPT | Performed by: FAMILY MEDICINE

## 2024-02-09 RX ORDER — PREDNISONE 10 MG/1
TABLET ORAL DAILY
Qty: 30 TABLET | Refills: 0 | Status: SHIPPED | OUTPATIENT
Start: 2024-02-09 | End: 2024-02-21

## 2024-02-09 RX ORDER — GUAIFENESIN 600 MG/1
600 TABLET, EXTENDED RELEASE ORAL 2 TIMES DAILY
Qty: 20 TABLET | Refills: 0 | Status: SHIPPED | OUTPATIENT
Start: 2024-02-09 | End: 2024-02-19

## 2024-02-09 RX ORDER — PANTOPRAZOLE SODIUM 40 MG/1
40 TABLET, DELAYED RELEASE ORAL DAILY
Qty: 30 TABLET | Refills: 0 | Status: SHIPPED | OUTPATIENT
Start: 2024-02-09

## 2024-02-09 RX ORDER — NICOTINE 21 MG/24HR
1 PATCH, TRANSDERMAL 24 HOURS TRANSDERMAL DAILY
Qty: 28 PATCH | Refills: 0 | Status: SHIPPED | OUTPATIENT
Start: 2024-02-10

## 2024-02-09 RX ADMIN — FLUTICASONE FUROATE AND VILANTEROL TRIFENATATE 1 PUFF: 100; 25 POWDER RESPIRATORY (INHALATION) at 09:39

## 2024-02-09 RX ADMIN — GUAIFENESIN 600 MG: 600 TABLET, EXTENDED RELEASE ORAL at 09:38

## 2024-02-09 RX ADMIN — Medication 1 TABLET: at 09:38

## 2024-02-09 RX ADMIN — SULFASALAZINE 500 MG: 500 TABLET ORAL at 09:39

## 2024-02-09 RX ADMIN — PANTOPRAZOLE SODIUM 40 MG: 40 INJECTION, POWDER, FOR SOLUTION INTRAVENOUS at 09:36

## 2024-02-09 RX ADMIN — FAMOTIDINE 20 MG: 20 TABLET, FILM COATED ORAL at 09:38

## 2024-02-09 RX ADMIN — NICOTINE 1 PATCH: 21 PATCH, EXTENDED RELEASE TRANSDERMAL at 09:38

## 2024-02-09 RX ADMIN — UMECLIDINIUM 1 PUFF: 62.5 AEROSOL, POWDER ORAL at 09:39

## 2024-02-09 RX ADMIN — METHYLPREDNISOLONE SODIUM SUCCINATE 40 MG: 40 INJECTION, POWDER, FOR SOLUTION INTRAMUSCULAR; INTRAVENOUS at 09:37

## 2024-02-09 RX ADMIN — ENOXAPARIN SODIUM 40 MG: 40 INJECTION SUBCUTANEOUS at 09:39

## 2024-02-09 NOTE — UTILIZATION REVIEW
Continued Stay Review    Date: 2/8/24                          Current Patient Class: Inpatient Current Level of Care: med surg    HPI:63 y.o. male initially admitted on 2/3/24 COPD, Covid     Assessment/Plan:  Reported that his stool is becoming lighter. No active bleeding seen. Breathing is better today. Decreased breath sounds BL, more tachypneic today, CXR ordered. Continue neb txs and inhaler, supplemental O2. Increase dose of steroids dt wheezing and tachypnea. Continue home meds, nicotine patch ordered. Monitor hgb and stool for occult blood, outpt fu with GI.     Vital Signs:   Date/Time Temp Pulse Resp BP MAP (mmHg) SpO2 Calculated FIO2 (%) - Nasal Cannula Nasal Cannula O2 Flow Rate (L/min) O2 Device   02/09/24 07:52:29 98.2 °F (36.8 °C) 78 -- 94/59 71 87 % Abnormal  -- -- --   02/08/24 22:30:03 -- 77 -- 101/57 72 96 % -- -- --   02/08/24 21:17:47 -- 67 -- 98/57 71 97 % -- -- --   02/08/24 21:14:35 98.5 °F (36.9 °C) 72 16 98/57 71 97 % -- -- --   02/08/24 16:21:57 98.9 °F (37.2 °C) 80 18 116/71 86 99 % -- -- --   02/08/24 0945 -- -- -- -- -- -- 36 4 L/min --   02/08/24 0800 -- -- -- 100/50 -- -- -- -- --   02/08/24 07:39:47 98.2 °F (36.8 °C) 69 17 87/51 Abnormal  63 Abnormal  96 % -- -- --   02/07/24 22:11:58 99.2 °F (37.3 °C) 77 18 96/59 71 96 % 36 4 L/min Nasal cannula   02/07/24 17:03:45 99.6 °F (37.6 °C) 94 -- 94/60 71 95 % -- -- --   02/07/24 1501 -- -- -- -- -- -- 36 4 L/min Nasal cannula   02/07/24 07:51:44 98.8 °F (37.1 °C) 96 16 98/64 75 96 % 36 4 L/min Nasal cannula     Pertinent Labs/Diagnostic Results:   Results from last 7 days   Lab Units 02/03/24  1607   SARS-COV-2  Positive*     Results from last 7 days   Lab Units 02/09/24  0629 02/08/24  0942 02/07/24  1929 02/07/24  0844 02/06/24  0519 02/05/24  0550 02/04/24  0514   WBC Thousand/uL 11.45* 10.50* 8.26 9.99 10.70* 11.86* 7.38   HEMOGLOBIN g/dL 10.8* 11.0* 11.4* 11.7* 11.3* 12.8 14.4   HEMATOCRIT % 34.2* 34.9* 37.0 37.3 36.9 41.1 44.9    PLATELETS Thousands/uL 159 170 181 184 185 197 191   NEUTROS ABS Thousands/µL  --   --   --   --  8.29* 10.47* 6.66         Results from last 7 days   Lab Units 02/09/24  0629 02/08/24  0942 02/07/24  0844 02/06/24  0519 02/05/24  0550   SODIUM mmol/L 138 138 140 141 139   POTASSIUM mmol/L 4.4 4.6 4.3 4.4 5.6*   CHLORIDE mmol/L 99 100 102 107 103   CO2 mmol/L 34* 36* 36* 31 31   ANION GAP mmol/L 5 2 2 3 5   BUN mg/dL 24 24 25 32* 31*   CREATININE mg/dL 1.32* 1.41* 1.34* 1.44* 1.42*   EGFR ml/min/1.73sq m 57 53 56 52 52   CALCIUM mg/dL 8.0* 8.1* 8.1* 7.7* 8.3*     Results from last 7 days   Lab Units 02/06/24  0519 02/05/24  0550 02/04/24  0348 02/03/24  1551   AST U/L 11* 16 19 24   ALT U/L 16 25 35 36   ALK PHOS U/L 66 86 108* 110*   TOTAL PROTEIN g/dL 5.7* 6.6 7.7 7.7   ALBUMIN g/dL 3.0* 3.4* 3.8 3.8   TOTAL BILIRUBIN mg/dL 0.21 0.26 0.46 0.77         Results from last 7 days   Lab Units 02/09/24  0629 02/08/24  0942 02/07/24  0844 02/06/24  0519 02/05/24  0550 02/04/24  0348 02/03/24  1551   GLUCOSE RANDOM mg/dL 138 119 90 136 147* 188* 118     Results from last 7 days   Lab Units 02/03/24  1551   PH SANAZ  7.392   PCO2 SANAZ mm Hg 42.7   PO2 SANAZ mm Hg 58.8*   HCO3 SANAZ mmol/L 25.4   BASE EXC SANAZ mmol/L 0.3   O2 CONTENT SANAZ ml/dL 17.4   O2 HGB, VENOUS % 81.3*         Results from last 7 days   Lab Units 02/03/24  1551   CK TOTAL U/L 825*     Results from last 7 days   Lab Units 02/03/24 2039 02/03/24  1757 02/03/24  1551   HS TNI 0HR ng/L  --   --  25   HS TNI 2HR ng/L  --  25  --    HSTNI D2 ng/L  --  0  --    HS TNI 4HR ng/L 25  --   --    HSTNI D4 ng/L 0  --   --      Results from last 7 days   Lab Units 02/04/24  0350 02/03/24 2039   D-DIMER QUANTITATIVE ug/ml FEU 0.45 0.52*     Results from last 7 days   Lab Units 02/04/24  0355 02/03/24  1551   PROCALCITONIN ng/ml 0.12 0.12     Results from last 7 days   Lab Units 02/03/24  1551   BNP pg/mL 133*     Results from last 7 days   Lab Units 02/08/24  0942   HEP B  S AG  Non-reactive   HEP C AB  Non-reactive   HEP B C IGM  Non-reactive   HEP B C TOTAL AB  Non-reactive         Results from last 7 days   Lab Units 02/07/24  0844 02/04/24  0348 02/03/24  1551   CRP mg/L 12.5* 117.8* 105.5*     Results from last 7 days   Lab Units 02/03/24  1607   INFLUENZA A PCR  Negative   INFLUENZA B PCR  Negative   RSV PCR  Negative     Medications:   Scheduled Medications:  aspirin, 81 mg, Oral, Daily  atorvastatin, 40 mg, Oral, Daily With Dinner  enoxaparin, 40 mg, Subcutaneous, Daily  famotidine, 20 mg, Oral, Daily  Fluticasone Furoate-Vilanterol, 1 puff, Inhalation, Daily  guaiFENesin, 600 mg, Oral, BID  methylPREDNISolone sodium succinate, 40 mg, Intravenous, Q12H JUVENTINO  metoprolol succinate, 25 mg, Oral, HS  multivitamin-minerals, 1 tablet, Oral, Daily  nicotine, 1 patch, Transdermal, Daily  pantoprazole, 40 mg, Intravenous, Q12H JUVENTINO  sulfaSALAzine, 500 mg, Oral, 4x Daily  umeclidinium, 1 puff, Inhalation, Daily      Continuous IV Infusions: none     PRN Meds:  acetaminophen, 650 mg, Oral, Q6H PRN  albuterol, 2 puff, Inhalation, Q2H PRN  albuterol, 2.5 mg, Nebulization, Q6H PRN  sodium chloride, 1 spray, Each Nare, Q1H PRN        Discharge Plan: [facility] Utilization Review Department  ATTENTION: Please call with any questions or concerns to [phone] and carefully listen to the prompts so that you are directed to the right person. All voicemails are confidential.   For Discharge needs, contact Care Management DC Support Team at [phone] opt. 2  Send all requests for admission clinical reviews, approved or denied determinations and any other requests to dedicated fax number below belonging to the campus where the patient is receiving treatment. List of dedicated fax numbers for the Facilities:  FACILITY NAME UR FAX NUMBER   ADMISSION DENIALS (Administrative/Medical Necessity) [phone]   DISCHARGE SUPPORT TEAM (NETWORK) [phone]   [facility] CHILD [facility]  (Maternity/NICU/Pediatrics) [phone]   [facility] [phone]   [facility] [phone]   [facility] [phone]   [facility] [phone]   [facility] [phone]   [facility] [phone]   [facility] [phone]   [facility] [phone]   [facility] [phone]   [facility] [phone]   [facility] [phone]   [facility] [phone]

## 2024-02-10 LAB — CALPROTECTIN STL-MCNT: 307 UG/G (ref 0–120)

## 2024-02-11 NOTE — DISCHARGE SUMMARY
Discharge Summary - [facility] Internal Medicine    Patient Information: Mathew Bird 63 y.o. male MRN: [id number]  [facility] Encounter: [id number]    Discharging Physician / Practitioner: Felisa Nguyen MD  PCP: Mariola Doyle MD  Admission Date: 2/3/2024  Discharge Date: 02/10/24    Disposition:     Home  Reason for Admission: Acute exacerbation of COPD    Discharge Diagnoses:     Principal Problem:    Acute exacerbation of chronic obstructive pulmonary disease (COPD) ([facility])  Active Problems:    Crohn disease ([facility])    Coronary artery disease involving native coronary artery of native heart without angina pectoris    Stage 4 very severe COPD by GOLD classification ([facility])    Dyslipidemia    Cigarette nicotine dependence without complication    Acute on chronic respiratory failure with hypoxia ([facility])    VT (ventricular tachycardia) ([facility])    COVID-19 virus infection  Resolved Problems:    * No resolved hospital problems. *      Consultations During Hospital Stay:  Pulmonology  Gastroenterology    Procedures Performed:         Significant Findings / Test Results:     Chest x-ray-2/7 no acute cardiopulmonary disease    Incidental Findings:        Test Results Pending at Discharge (will require follow up):        Outpatient Tests Requested:      Complications:   none    Hospital Course:     Mathew Bird is a 63 y.o. male patient who originally presented to the hospital on 2/3/2024 due to shortness of breath.  Patient with severe COPD at baseline and is on 4 L nasal cannula.  Patient also tested positive for COVID.  At the time of presentation patient was requiring BiPAP.  Patient was admitted to the hospital started on steroids and breathing treatments.  Due to his COPD patient was started on remdesivir.  Patient was evaluated by pulmonology.  Patient was able to be weaned down to his home oxygen of 4 L.  Patient finished the course of remdesivir in the hospital.  Initially patient was transitioned to  "dexamethasone with worsening of respiratory status.  Patient was restarted back on steroids with improvement.  Patient will be discharged with a prednisone taper.  Patient will be restarted back on his inhalers at the time of discharge.  While in the hospital patient developed black stools.  Hemoccult was positive.  Hemoglobin dropped but then relatively remained stable.  Patient with known history of Crohn's disease and is followed by colorectal surgery Dr. Salazar as outpatient.  GI rectum.  Outpatient follow-up with either GI or colorectal surgery for repeat endoscopy and colonoscopy.  Patient is agreeable with this plan.  Patient will decide who he wants to follow-up as outpatient.  Patient was offered visiting nurses but he refused.  Patient symptomatically improved and he was discharged in a stable condition on 2/9/2024.  For details refer to the chart    Condition at Discharge: stable     Discharge Day Visit / Exam:     Subjective: Patient seen and examined.  Reported that he is stool is not dark anymore.  Wants to go home today  Vitals: Blood Pressure: 94/59 (02/09/24 0752)  Pulse: 78 (02/09/24 0752)  Temperature: 98.2 °F (36.8 °C) (02/09/24 0752)  Temp Source: Axillary (02/03/24 1550)  Respirations: 16 (02/08/24 2114)  Height: 5' 6\" (167.6 cm) (02/03/24 2300)  Weight - Scale: 77.1 kg (170 lb) (02/03/24 2300)  SpO2: (!) 87 % (02/09/24 0752)  Exam:   Physical Exam  Constitutional:       General: He is not in acute distress.  HENT:      Head: Normocephalic.      Nose: Nose normal.   Eyes:      General: No scleral icterus.  Cardiovascular:      Rate and Rhythm: Normal rate and regular rhythm.      Heart sounds: No murmur heard.  Pulmonary:      Effort: Pulmonary effort is normal. No respiratory distress.      Comments: Decreased breath is bilateral  Abdominal:      General: There is no distension.   Musculoskeletal:         General: No swelling. Normal range of motion.   Skin:     General: Skin is warm. "   Neurological:      Mental Status: He is alert. Mental status is at baseline.         Discussion with Family: Patient declined to contact family    Discharge instructions/Information to patient and family:   See after visit summary for information provided to patient and family.      Provisions for Follow-Up Care:  See after visit summary for information related to follow-up care and any pertinent home health orders.      Planned Readmission:  none     Discharge Statement:  I spent 45 minutes discharging the patient. This time was spent on the day of discharge. I had direct contact with the patient on the day of discharge. Greater than 50% of the total time was spent examining patient, answering all patient questions, arranging and discussing plan of care with patient as well as directly providing post-discharge instructions.  Additional time then spent on discharge activities.    Discharge Medications:  See after visit summary for reconciled discharge medications provided to patient and family.      ** Please Note: This note has been constructed using a voice recognition system **

## 2024-02-12 ENCOUNTER — PATIENT OUTREACH (OUTPATIENT)
Dept: FAMILY MEDICINE CLINIC | Facility: CLINIC | Age: 64
End: 2024-02-12

## 2024-02-12 DIAGNOSIS — Z71.89 COORDINATION OF COMPLEX CARE: Primary | ICD-10-CM

## 2024-02-12 NOTE — UTILIZATION REVIEW
NOTIFICATION OF ADMISSION DISCHARGE   This is a Notification of Discharge from [facility]. Please be advised that this patient has been discharge from our facility. Below you will find the admission and discharge date and time including the patient’s disposition.   UTILIZATION REVIEW CONTACT:  Srinivasan Jimenez  Utilization   Network Utilization Review Department  Phone: [phone] x carefully listen to the prompts. All voicemails are confidential.  Email: [email or web address]     ADMISSION INFORMATION  PRESENTATION DATE: 2/3/2024  3:42 PM  OBERVATION ADMISSION DATE:   INPATIENT ADMISSION DATE: 2/3/24  6:27 PM   DISCHARGE DATE: 2/9/2024  3:04 PM   DISPOSITION:Home/Self Care    Network Utilization Review Department  ATTENTION: Please call with any questions or concerns to [phone] and carefully listen to the prompts so that you are directed to the right person. All voicemails are confidential.   For Discharge needs, contact Care Management DC Support Team at [phone] opt. 2  Send all requests for admission clinical reviews, approved or denied determinations and any other requests to dedicated fax number below belonging to the campus where the patient is receiving treatment. List of dedicated fax numbers for the Facilities:  FACILITY NAME UR FAX NUMBER   ADMISSION DENIALS (Administrative/Medical Necessity) [phone]   DISCHARGE SUPPORT TEAM ([facility]) [phone]   PARENT CHILD HEALTH (Maternity/NICU/Pediatrics) [phone]   [facility] [phone]   [facility] [phone]   [facility] [phone]   [facility] [phone]   [facility] [phone]   [facility] [phone]   [facility] [phone]   [facility] [phone]   [facility]  [facility] [phone]   [facility] [phone]   [facility] [phone]   [facility] [phone]

## 2024-02-14 ENCOUNTER — PATIENT OUTREACH (OUTPATIENT)
Dept: FAMILY MEDICINE CLINIC | Facility: CLINIC | Age: 64
End: 2024-02-14

## 2024-02-19 ENCOUNTER — PATIENT OUTREACH (OUTPATIENT)
Dept: FAMILY MEDICINE CLINIC | Facility: CLINIC | Age: 64
End: 2024-02-19

## 2024-02-19 NOTE — PROGRESS NOTES
2nd message left for patient on voicemail. Unable to reach letter emailed to patient via [employer] messages.

## 2024-02-19 NOTE — LETTER
Date: 02/19/24    Dear Mathew Bird,   My name is Kira; I am a registered nurse care manager working with [facility] at [facility].   I have not been able to reach you and would like to set a time that I can talk with you over the phone.  My work is to help patients that have complex medical conditions get the care they need. This includes patients who may have been in the hospital or emergency room.    I have enclosed information for you. Please call me with any questions you may have. I look forward to speaking with you.  Sincerely,  Kira Garcia RN  [phone]  Outpatient Care Manager

## 2024-02-20 ENCOUNTER — TELEPHONE (OUTPATIENT)
Age: 64
End: 2024-02-20

## 2024-02-21 ENCOUNTER — TELEPHONE (OUTPATIENT)
Dept: FAMILY MEDICINE CLINIC | Facility: CLINIC | Age: 64
End: 2024-02-21

## 2024-02-28 ENCOUNTER — OFFICE VISIT (OUTPATIENT)
Dept: FAMILY MEDICINE CLINIC | Facility: CLINIC | Age: 64
End: 2024-02-28
Payer: COMMERCIAL

## 2024-02-28 VITALS
TEMPERATURE: 97.8 F | HEART RATE: 122 BPM | OXYGEN SATURATION: 86 % | DIASTOLIC BLOOD PRESSURE: 62 MMHG | BODY MASS INDEX: 27.13 KG/M2 | RESPIRATION RATE: 20 BRPM | HEIGHT: 66 IN | SYSTOLIC BLOOD PRESSURE: 118 MMHG | WEIGHT: 168.8 LBS

## 2024-02-28 DIAGNOSIS — I48.0 PAROXYSMAL ATRIAL FIBRILLATION (HCC): ICD-10-CM

## 2024-02-28 DIAGNOSIS — K50.918 CROHN'S DISEASE WITH OTHER COMPLICATION, UNSPECIFIED GASTROINTESTINAL TRACT LOCATION (HCC): ICD-10-CM

## 2024-02-28 DIAGNOSIS — J44.9 STAGE 4 VERY SEVERE COPD BY GOLD CLASSIFICATION (HCC): Primary | ICD-10-CM

## 2024-02-28 DIAGNOSIS — J96.21 ACUTE ON CHRONIC RESPIRATORY FAILURE WITH HYPOXIA (HCC): ICD-10-CM

## 2024-02-28 DIAGNOSIS — J44.1 ACUTE EXACERBATION OF CHRONIC OBSTRUCTIVE PULMONARY DISEASE (COPD) (HCC): ICD-10-CM

## 2024-02-28 DIAGNOSIS — I47.20 VT (VENTRICULAR TACHYCARDIA) (HCC): ICD-10-CM

## 2024-02-28 DIAGNOSIS — K92.2 GI BLEEDING: ICD-10-CM

## 2024-02-28 PROCEDURE — 99214 OFFICE O/P EST MOD 30 MIN: CPT | Performed by: NURSE PRACTITIONER

## 2024-02-28 RX ORDER — PANTOPRAZOLE SODIUM 40 MG/1
40 TABLET, DELAYED RELEASE ORAL DAILY
Qty: 30 TABLET | Refills: 0 | Status: SHIPPED | OUTPATIENT
Start: 2024-02-28

## 2024-02-28 NOTE — PROGRESS NOTES
Name: Mathew Bird      : 1960      MRN: [id number]  Encounter Provider: AMALIA Carnes  Encounter Date: 2024   Encounter department: [facility]    Assessment & Plan     1. Stage 4 very severe COPD by GOLD classification ([facility])  Assessment & Plan:  Scheduled for follow up with pulmonology Friday.  Continues to smoke.  Continue with Breztri Aerosphere, albuterol prn.  Continue 4L nasal canula o2 as prescribed.        2. GI bleeding  -     pantoprazole (PROTONIX) 40 mg tablet; Take 1 tablet (40 mg total) by mouth daily    3. Acute on chronic respiratory failure with hypoxia ([facility])  Assessment & Plan:  Steroids completed.  He is using his o2 at home on 4L.  Not wearing o2 here and initially o2 was low but after sitting it did go up to 91%.  He feels worse since he was d/c'd from the hospital but still better than the time of admission.  He is using his inhalers, continues to smoke.  Follow up as scheduled Friday with pulmonologist.      4. Acute exacerbation of chronic obstructive pulmonary disease (COPD) ([facility])  Assessment & Plan:  Steroids completed.  He is using his o2 at home on 4L.  Not wearing o2 here and initially o2 was low but after sitting it did go up to 91%.  He feels worse since he was d/c'd from the hospital but still better than the time of admission.  He is using his inhalers, continues to smoke.  Follow up as scheduled Friday with pulmonologist.      5. VT (ventricular tachycardia) ([facility])  Assessment & Plan:  Continue metoprolol.  Follow up cardiology      6. Paroxysmal atrial fibrillation (HCC)  Assessment & Plan:  Continue metoprolol, aspirin.  Not anticoagulated.  Follow up cardiology.      7. Crohn's disease with other complication, unspecified gastrointestinal tract location ([facility])  Assessment & Plan:  Positive hemoccult stool in hospital and pt is aware to follow up with GI.  He is debating if he wants to continue with his current GI who is not  "in-network vs establish with [facility].  He says he has the number for both and will schedule.  He denies seeing blood in his stool.             Subjective      Pt is a 62 yo male here for hospital follow up.  Past medical history of copd, pulmonary nodules, coronary artery disease, paroxysmal afip pericarditis, VT, bladder wall thickening, alcohol abuse, crohn's disease, tobacco abuse, prediabetes, dyslipidemia, anemia, drug-eluting stent.  Admitted to [facility] 2/3-2/9 for acute exacerbation of COPD.  He presented with sob and tested positive for covid.  At baseline he requires 4L o2 and required bipap.  He was started on steroids and breathing treatments as well as remdesivir.  He was transitioned to dexamethasone and respiratory status worsened so he was restarted back on steroids and improved.  D/c home with prednisone taper and his inhalers.  He says his breathing is \"terrible\" and worse than when he was d/c'd but not as bad as when he went in.  He was weaned back to 4L of o2 and this is what he has been using.  He says he bought longer tubing on [employer] so he can walk around the house with the oxygen.  He says he is not coughing much but he is sob when he exerts himself.  He reports wheezing.  Denies fever, chills.  Appetite is good.  He had a positive hemoccult in the hospital, hemoglobin dropped but was stable with current management..  follows with Dr. Salazar and was advised to follow up with his usual team for repeat endosocpy and colonoscopy, to which the patient agreed.  Visiting nurse was offered but pt declined.      Review of Systems   Constitutional:  Positive for diaphoresis and fatigue. Negative for appetite change, chills and fever.   Respiratory:  Positive for cough, shortness of breath and wheezing.    Cardiovascular:  Negative for chest pain and palpitations.   Gastrointestinal:  Negative for abdominal pain, blood in stool, constipation, diarrhea, nausea and vomiting. " "  Genitourinary:  Negative for difficulty urinating.   Neurological:  Negative for dizziness, light-headedness and headaches.   Psychiatric/Behavioral:  Positive for sleep disturbance.        Current Outpatient Medications on File Prior to Visit   Medication Sig    albuterol (2.5 mg/3 mL) 0.083 % nebulizer solution TAKE 3 ML (2.5 MG TOTAL) BY NEBULIZATION EVERY 6 HOURS AS NEEDED FOR WHEEZING OR SHORTNESS OF BREATH    albuterol (PROVENTIL HFA,VENTOLIN HFA) 90 mcg/act inhaler INHALE 2 PUFFS EVERY 6 HOURS AS NEEDED FOR WHEEZING    aspirin 81 mg chewable tablet Chew 1 tablet (81 mg total) daily    atorvastatin (LIPITOR) 40 mg tablet Take 1 tablet (40 mg total) by mouth daily with dinner    Budeson-Glycopyrrol-Formoterol (Breztri Aerosphere) 160-9-4.8 MCG/ACT AERO INHALE 2 PUFFS BY MOUTH TWICE A DAY RINSE MOUTH AFTER USE    metoprolol succinate (TOPROL-XL) 25 mg 24 hr tablet Take 1 tablet (25 mg total) by mouth daily at bedtime Daily at bedtime    Multiple Vitamins-Minerals (Centrum Silver 50+Men) TABS Take by mouth    Nebulizers (AERONEB GO NEBULIZER HANDSET) MISC by Does not apply route 2 (two) times a day as needed (wheezing)    sulfaSALAzine (AZULFIDINE) 500 mg tablet Take 1 tablet (500 mg total) by mouth 4 (four) times a day    [DISCONTINUED] pantoprazole (PROTONIX) 40 mg tablet Take 1 tablet (40 mg total) by mouth daily    nicotine (NICODERM CQ) 21 mg/24 hr TD 24 hr patch Place 1 patch on the skin over 24 hours daily       Objective     /62 (BP Location: Left arm, Patient Position: Sitting, Cuff Size: Standard)   Pulse (!) 122   Temp 97.8 °F (36.6 °C) (Tympanic)   Resp 20   Ht 5' 6\" (1.676 m)   Wt 76.6 kg (168 lb 12.8 oz)   SpO2 (!) 86%   BMI 27.25 kg/m²     Physical Exam  Vitals reviewed.   Constitutional:       General: He is awake. He is not in acute distress.     Appearance: Normal appearance. He is well-developed. He is not ill-appearing.   Eyes:      General: Lids are normal.      " Conjunctiva/sclera: Conjunctivae normal.   Cardiovascular:      Rate and Rhythm: Normal rate and regular rhythm.      Heart sounds: Normal heart sounds.   Pulmonary:      Effort: Pulmonary effort is normal. No tachypnea or respiratory distress.      Breath sounds: Decreased air movement present. Decreased breath sounds and rhonchi present.   Abdominal:      General: Bowel sounds are normal.      Tenderness: There is no abdominal tenderness.      Hernia: A hernia is present.   Musculoskeletal:      Right lower leg: No edema.      Left lower leg: No edema.   Skin:     General: Skin is warm and dry.   Neurological:      Mental Status: He is alert and oriented to person, place, and time.   Psychiatric:         Attention and Perception: Attention normal.         Mood and Affect: Mood normal.         Speech: Speech normal.         Behavior: Behavior normal. Behavior is cooperative.         Thought Content: Thought content normal.         Cognition and Memory: Cognition normal.         Judgment: Judgment normal.       AMALIA Carnes

## 2024-02-28 NOTE — ASSESSMENT & PLAN NOTE
Scheduled for follow up with pulmonology Friday.  Continues to smoke.  Continue with Breztri Aerosphere, albuterol prn.  Continue 4L nasal canula o2 as prescribed.

## 2024-02-28 NOTE — ASSESSMENT & PLAN NOTE
Steroids completed.  He is using his o2 at home on 4L.  Not wearing o2 here and initially o2 was low but after sitting it did go up to 91%.  He feels worse since he was d/c'd from the hospital but still better than the time of admission.  He is using his inhalers, continues to smoke.  Follow up as scheduled Friday with pulmonologist.

## 2024-02-28 NOTE — ASSESSMENT & PLAN NOTE
Positive hemoccult stool in hospital and pt is aware to follow up with GI.  He is debating if he wants to continue with his current GI who is not in-network vs establish with  [facility].  He says he has the number for both and will schedule.  He denies seeing blood in his stool.

## 2024-03-01 ENCOUNTER — OFFICE VISIT (OUTPATIENT)
Dept: PULMONOLOGY | Facility: CLINIC | Age: 64
End: 2024-03-01
Payer: COMMERCIAL

## 2024-03-01 ENCOUNTER — TELEPHONE (OUTPATIENT)
Age: 64
End: 2024-03-01

## 2024-03-01 VITALS
HEART RATE: 89 BPM | SYSTOLIC BLOOD PRESSURE: 118 MMHG | OXYGEN SATURATION: 96 % | DIASTOLIC BLOOD PRESSURE: 64 MMHG | WEIGHT: 170 LBS | BODY MASS INDEX: 27.44 KG/M2 | TEMPERATURE: 97.6 F

## 2024-03-01 DIAGNOSIS — J44.9 STAGE 4 VERY SEVERE COPD BY GOLD CLASSIFICATION (HCC): Primary | ICD-10-CM

## 2024-03-01 DIAGNOSIS — J96.11 CHRONIC HYPOXEMIC RESPIRATORY FAILURE (HCC): ICD-10-CM

## 2024-03-01 PROCEDURE — 94618 PULMONARY STRESS TESTING: CPT | Performed by: INTERNAL MEDICINE

## 2024-03-01 PROCEDURE — 99214 OFFICE O/P EST MOD 30 MIN: CPT | Performed by: INTERNAL MEDICINE

## 2024-03-01 RX ORDER — ALBUTEROL SULFATE 90 UG/1
2 AEROSOL, METERED RESPIRATORY (INHALATION) EVERY 4 HOURS PRN
Qty: 18 G | Refills: 5 | Status: SHIPPED | OUTPATIENT
Start: 2024-03-01

## 2024-03-01 RX ORDER — BUDESONIDE, GLYCOPYRROLATE, AND FORMOTEROL FUMARATE 160; 9; 4.8 UG/1; UG/1; UG/1
2 AEROSOL, METERED RESPIRATORY (INHALATION) 2 TIMES DAILY
Qty: 32.1 G | Refills: 3 | Status: SHIPPED | OUTPATIENT
Start: 2024-03-01

## 2024-03-01 RX ORDER — ALBUTEROL SULFATE 2.5 MG/3ML
2.5 SOLUTION RESPIRATORY (INHALATION) EVERY 4 HOURS PRN
Qty: 375 ML | Refills: 1 | Status: SHIPPED | OUTPATIENT
Start: 2024-03-01

## 2024-03-01 NOTE — PROGRESS NOTES
Pulmonary Outpatient Note   Mathew Bird 63 y.o. male MRN: [id number]  3/1/2024          Reason for Consultation:    Chief Complaint   Patient presents with    COPD         Assessment/Plan:    1. Stage 4 very severe COPD by GOLD classification ([facility])  Assessment & Plan:  Patient symptomatic with recent exacerbation after COVID19 infection. Also with ongoing cigar smoking.    Plan  Continue Breztri  PRN albuterol  Smoking cessation counseling  O2 titration today- very deconditioned. Could only walk 2 minutes.  He required 2 lpm of supplemental oxygen with ambulation  Pulmonary rehab  Update PFTs  Up-to-date with vaccines  RTC 3 months      Orders:  -     POCT Oxygen Titration  -     Ambulatory Referral to Pulmonary Rehabilitation; Future  -     Budeson-Glycopyrrol-Formoterol (Breztri Aerosphere) 160-9-4.8 MCG/ACT AERO; Inhale 2 puffs 2 (two) times a day Rinse mouth after use  -     albuterol (PROVENTIL HFA,VENTOLIN HFA) 90 mcg/act inhaler; Inhale 2 puffs every 4 (four) hours as needed for wheezing  -     albuterol (2.5 mg/3 mL) 0.083 % nebulizer solution; Take 3 mL (2.5 mg total) by nebulization every 4 (four) hours as needed for wheezing or shortness of breath  -     Complete PFT with post bronchodilator; Future  -     Home Oxygen with Portability    2. Chronic hypoxemic respiratory failure (HCC)          Health Maintenance  Immunization History   Administered Date(s) Administered    COVID-19 PFIZER VACCINE 0.3 ML IM 06/25/2021    INFLUENZA 10/21/2016, 10/07/2018    Influenza Quadrivalent, 6-35 Months IM 10/21/2016    Influenza, injectable, quadrivalent, preservative free 0.5 mL 09/15/2023    Influenza, recombinant, quadrivalent,injectable, preservative free 10/07/2018, 03/04/2020    Influenza, seasonal, injectable 09/29/2015    Pneumococcal Conjugate 13-Valent 12/08/2016    Pneumococcal Polysaccharide PPV23 03/04/2020    Respiratory Syncytial Virus Vaccine (Recombinant, Adjuvanted) 12/27/2023    Tdap 07/10/2017,  07/16/2021            Return in about 3 months (around 6/1/2024).    History of Present Illness   HPI:  Mathew Bird is a 63 y.o. male who has COPD who presents for follow-up.    He reports his breathing is poor- short of breath with minimal exertion- walking [id number].  At home he has an oxygen tank.  He has POC- up to 96% today on room air.  He coughs now and then.  Wheeze.  He has been on prednisone just the one time int he last year.    Breztri 2 puffs BID  Albuterol inhaler- uses that multiple times a day as well as the nebulizer.    Smoking a couple of cigars a day.  No cigarettes currently- has been many years.    No vaping. No drugs.    He was hosptialized with COVID earlier in the month.        Review of Systems   Constitutional:  Negative for chills and fever.   HENT:  Negative for ear pain and sore throat.    Eyes:  Negative for pain and visual disturbance.   Respiratory:  Positive for cough, shortness of breath and wheezing.    Cardiovascular:  Negative for chest pain and palpitations.   Gastrointestinal:  Negative for abdominal pain and vomiting.   Genitourinary:  Negative for dysuria and hematuria.   Musculoskeletal:  Negative for arthralgias and back pain.   Skin:  Negative for color change and rash.   Neurological:  Negative for seizures and syncope.   All other systems reviewed and are negative.          Historical Information   Past Medical History:   Diagnosis Date    Anxiety     Asthma     Cardiac disease     Cervical stenosis (uterine cervix)     Chest pain     Chronic kidney disease     Colitis     Colon polyps     COPD (chronic obstructive pulmonary disease) (HCC)     2L @ HS and PRN    Coronary artery disease     Diverticulitis     Esophageal reflux     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HCC)     Heart failure, systolic, due to idiopathic cardiomyopathy (HCC) 5/21/2018    Hyperlipidemia     Hypertension     IBD (inflammatory bowel disease)     Myocardial infarction (HCC)     Old myocardial  infarction     Paroxysmal atrial fibrillation (HCC) 8/24/2018    Perforation of colon (HCC)     Type 2 diabetes, diet controlled (HCC)     Ulcerative colitis (HCC)      Past Surgical History:   Procedure Laterality Date    ANGIOPLASTY      APPENDECTOMY      COLON SURGERY      COLONOSCOPY N/A 10/24/2016    Procedure: COLONOSCOPY;  Surgeon: Tirso Juárez MD;  Location: [facility];  Service:     CORONARY ANGIOPLASTY WITH STENT PLACEMENT      ESOPHAGOGASTRODUODENOSCOPY N/A 10/24/2016    Procedure: ESOPHAGOGASTRODUODENOSCOPY (EGD);  Surgeon: Tirso Juárez MD;  Location: [facility];  Service:     EXPLORATORY LAPAROTOMY W/ BOWEL RESECTION N/A 5/22/2018    Procedure: EXPLORATORY LAPAROTOMY, ILIOCOLECTOMY, ILIOCOLONIC ANASTAMOSIS, LOOP ILIOSTOMY, REPAIR OF SEROSAL TEAR, EXTENSIVE LYSIS OF ADHESIONS, WOUND VAC PLACEMENT;  Surgeon: Tirso Juárez MD;  Location: [facility];  Service: Colorectal    HEMICOLECTOMY      ILEOSTOMY CLOSURE N/A 10/5/2018    Procedure: CLOSURE ILEOSTOMY;  Surgeon: Tirso Juárez MD;  Location: [facility];  Service: Colorectal    OTHER SURGICAL HISTORY      stent indications acute myocardial infarction    MO COLONOSCOPY FLX DX W/COLLJ SPEC WHEN PFRMD N/A 2/6/2018    Procedure: COLONOSCOPY;  Surgeon: Tirso Juárez MD;  Location: [facility];  Service: Colorectal    MO COLONOSCOPY FLX DX W/COLLJ SPEC WHEN PFRMD N/A 10/4/2018    Procedure: COLONOSCOPY;  Surgeon: Tirso Juárez MD;  Location: [facility];  Service: Colorectal    TONSILLECTOMY       Family History   Problem Relation Age of Onset    Coronary artery disease Father     Crohn's disease Father     Stroke Father     Diabetes Family            Meds/Allergies     Current Outpatient Medications:     albuterol (2.5 mg/3 mL) 0.083 % nebulizer solution, Take 3 mL (2.5 mg total) by nebulization every 4 (four) hours as needed for wheezing or shortness of breath, Disp: 375 mL, Rfl: 1    albuterol (PROVENTIL HFA,VENTOLIN HFA) 90 mcg/act  inhaler, Inhale 2 puffs every 4 (four) hours as needed for wheezing, Disp: 18 g, Rfl: 5    aspirin 81 mg chewable tablet, Chew 1 tablet (81 mg total) daily, Disp: 90 tablet, Rfl: 4    atorvastatin (LIPITOR) 40 mg tablet, Take 1 tablet (40 mg total) by mouth daily with dinner, Disp: 90 tablet, Rfl: 3    Budeson-Glycopyrrol-Formoterol (Breztri Aerosphere) 160-9-4.8 MCG/ACT AERO, Inhale 2 puffs 2 (two) times a day Rinse mouth after use, Disp: 32.1 g, Rfl: 3    metoprolol succinate (TOPROL-XL) 25 mg 24 hr tablet, Take 1 tablet (25 mg total) by mouth daily at bedtime Daily at bedtime, Disp: 90 tablet, Rfl: 4    Multiple Vitamins-Minerals (Centrum Silver 50+Men) TABS, Take by mouth, Disp: , Rfl:     Nebulizers (AERONEB GO NEBULIZER HANDSET) MISC, by Does not apply route 2 (two) times a day as needed (wheezing), Disp: 1 each, Rfl: 0    nicotine (NICODERM CQ) 21 mg/24 hr TD 24 hr patch, Place 1 patch on the skin over 24 hours daily, Disp: 28 patch, Rfl: 0    pantoprazole (PROTONIX) 40 mg tablet, Take 1 tablet (40 mg total) by mouth daily, Disp: 30 tablet, Rfl: 0    sulfaSALAzine (AZULFIDINE) 500 mg tablet, Take 1 tablet (500 mg total) by mouth 4 (four) times a day, Disp: 360 tablet, Rfl: 0  Allergies   Allergen Reactions    Medical Tape     Other      Brown cloth band aids       Latex Rash       Vitals: Blood pressure 118/64, pulse 89, temperature 97.6 °F (36.4 °C), temperature source Tympanic, weight 77.1 kg (170 lb), SpO2 96%. Body mass index is 27.44 kg/m². Oxygen Therapy  SpO2: 96 %  Oxygen Therapy: None (Room air)      Physical Exam  Physical Exam  Vitals and nursing note reviewed.   Constitutional:       General: He is not in acute distress.     Appearance: He is well-developed.      Comments: Odor of tobacco   HENT:      Head: Normocephalic and atraumatic.   Eyes:      Conjunctiva/sclera: Conjunctivae normal.   Cardiovascular:      Rate and Rhythm: Normal rate and regular rhythm.      Heart sounds: No murmur  heard.  Pulmonary:      Effort: Pulmonary effort is normal. No respiratory distress.      Breath sounds: Normal breath sounds.   Abdominal:      Palpations: Abdomen is soft.      Tenderness: There is no abdominal tenderness.   Musculoskeletal:         General: No swelling.      Cervical back: Neck supple.      Right lower leg: No edema.      Left lower leg: No edema.   Skin:     General: Skin is warm and dry.      Capillary Refill: Capillary refill takes less than 2 seconds.   Neurological:      Mental Status: He is alert.   Psychiatric:         Mood and Affect: Mood normal.         Labs:   I have personally reviewed pertinent lab results.    ABG:   Lab Results   Component Value Date    PHART 7.411 04/30/2023    FYO4WMH 48.1 (H) 04/30/2023    PO2ART 68.2 (L) 04/30/2023    FCG2KOA 29.9 (H) 04/30/2023    BEART 4.7 04/30/2023    SOURCE Brachial, Right 04/30/2023   ,   BNP:   Lab Results   Component Value Date     (H) 02/03/2024   ,   CBC:  Lab Results   Component Value Date    WBC 11.45 (H) 02/09/2024    HGB 10.8 (L) 02/09/2024    HCT 34.2 (L) 02/09/2024    MCV 94 02/09/2024     02/09/2024    EOSPCT 0 02/06/2024    EOSABS 0.01 02/06/2024    NEUTOPHILPCT 77 (H) 02/06/2024    LYMPHOPCT 12 (L) 02/06/2024   ,   CMP:   Lab Results   Component Value Date    SODIUM 138 02/09/2024    K 4.4 02/09/2024    CL 99 02/09/2024    CO2 34 (H) 02/09/2024    ANIONGAP 6 05/29/2015    BUN 24 02/09/2024    CREATININE 1.32 (H) 02/09/2024    GLUCOSE 92 07/08/2018    CALCIUM 8.0 (L) 02/09/2024    AST 11 (L) 02/06/2024    ALT 16 02/06/2024    ALKPHOS 66 02/06/2024    PROT 7.8 05/21/2015    BILITOT 0.58 05/21/2015    EGFR 57 02/09/2024   ,   PT/INR:   Lab Results   Component Value Date    INR 1.32 (H) 04/28/2023   ,   Troponin:   Lab Results   Component Value Date    TROPONINI <0.02 08/07/2018         Imaging and other studies: I have personally reviewed pertinent reports.   and I have personally reviewed pertinent films in  PACS    CXR 2/7/24  No acute cardiopulmonary disease.     CT Chest 4/2023  LUNGS: Examination is limited due to respiratory motion. Moderate centrilobular emphysema.  There is no tracheal or endobronchial lesion. A 2 mm right middle lobe pulmonary nodule (4/147) and a 3 mm right lower lobe solid pulm nodule (4/193), stable   since 5/15/2018, therefore consistent with a benign etiology. No new or enlarging pulmonary nodules  IMPRESSION:     1. No aortic dissection, intramural hematoma, or aortic aneurysm. Patent mesenteric vessels.     2. Circumferential mural thickening of the esophagus with an air-fluid level, which may relate to esophagitis and esophageal dysmotility.     3. Hepatomegaly with severe hepatic steatosis.    Pulmonary function testing:     Office Spirometry Results: 2020     FEV1/FVC 46%; FVC 1.48 L, 35% predicted; FEV1 0.68 L, 21% predicted   Very severe obstruction       EKG, Pathology, and Other Studies: I have personally reviewed pertinent reports.       TTE 4/26/23    Study Details: This was a technically difficult study.    Left Ventricle: Left ventricular cavity size is normal. Wall thickness is normal. Systolic function is mildly reduced to low normal by visual estimation in the setting of tachycardia.    Right Ventricle: Right ventricular cavity size appears mildly dilated. Systolic function is normal.    Prior TTE study available for comparison. Prior study date: 6/14/2018. No significant changes noted compared to the prior study.    Rony Limon M.D.  [facility]

## 2024-03-01 NOTE — ASSESSMENT & PLAN NOTE
Patient symptomatic with recent exacerbation after COVID19 infection. Also with ongoing cigar smoking.    Plan  Continue Breztri  PRN albuterol  Smoking cessation counseling  O2 titration today- very deconditioned. Could only walk 2 minutes.  He required 2 lpm of supplemental oxygen with ambulation  Pulmonary rehab  Update PFTs  Up-to-date with vaccines  RTC 3 months

## 2024-03-01 NOTE — TELEPHONE ENCOUNTER
Please fax current medication list as it was not received with form sent on 2/7 (found in [employer])    Fax [phone]

## 2024-03-04 ENCOUNTER — TELEPHONE (OUTPATIENT)
Age: 64
End: 2024-03-04

## 2024-03-04 NOTE — TELEPHONE ENCOUNTER
Incoming call:    Re: Pt of Dr. Limon    Pt requesting portable oxygen concentrator. Pt received text from [employer] showing a home oxygen set up with tank. Pt calling to make sure correct order was sent.    Please advise:  [phone]

## 2024-03-05 LAB

## 2024-03-06 ENCOUNTER — PATIENT OUTREACH (OUTPATIENT)
Dept: FAMILY MEDICINE CLINIC | Facility: CLINIC | Age: 64
End: 2024-03-06

## 2024-03-06 NOTE — PROGRESS NOTES
No response from patient to telephone calls or unable to reach letter.   Will close episode at this time. Pt has been provided my contact information if future assistance is needed.

## 2024-04-02 DIAGNOSIS — K92.2 GI BLEEDING: ICD-10-CM

## 2024-04-02 DIAGNOSIS — I25.10 CORONARY ARTERY DISEASE INVOLVING NATIVE CORONARY ARTERY OF NATIVE HEART WITHOUT ANGINA PECTORIS: ICD-10-CM

## 2024-04-02 RX ORDER — PANTOPRAZOLE SODIUM 40 MG/1
40 TABLET, DELAYED RELEASE ORAL DAILY
Qty: 90 TABLET | Refills: 1 | Status: SHIPPED | OUTPATIENT
Start: 2024-04-02

## 2024-04-02 RX ORDER — LORATADINE 10 MG
81 TABLET ORAL DAILY
Qty: 90 TABLET | Refills: 1 | Status: SHIPPED | OUTPATIENT
Start: 2024-04-02

## 2024-05-16 DIAGNOSIS — J44.9 STAGE 4 VERY SEVERE COPD BY GOLD CLASSIFICATION (HCC): ICD-10-CM

## 2024-05-17 RX ORDER — ALBUTEROL SULFATE 2.5 MG/3ML
SOLUTION RESPIRATORY (INHALATION)
Qty: 375 ML | Refills: 5 | Status: SHIPPED | OUTPATIENT
Start: 2024-05-17

## 2024-05-20 ENCOUNTER — TELEPHONE (OUTPATIENT)
Age: 64
End: 2024-05-20

## 2024-05-20 NOTE — TELEPHONE ENCOUNTER
Pt call in and states that he has been spacing out his doses but now doing 1 every 6 hours. He sates he is going to run out before his next refill due date. only has 5 days left. They will refill Breztri the first of the month. Pt is wondering if we have sample in office he could . Please advise.

## 2024-05-21 ENCOUNTER — TELEPHONE (OUTPATIENT)
Age: 64
End: 2024-05-21

## 2024-05-21 NOTE — TELEPHONE ENCOUNTER
Pt called to make sure Breztri samples will be ready for pickup today as per yesterday's conversation.     Pt will be there by 4:00pm.

## 2024-05-21 NOTE — TELEPHONE ENCOUNTER
Pt called to make sure Brezi samples will be ready for pickup today as per conversation yesterday. Pt will be there by 4:00pm.

## 2024-06-03 ENCOUNTER — OFFICE VISIT (OUTPATIENT)
Dept: PULMONOLOGY | Facility: CLINIC | Age: 64
End: 2024-06-03
Payer: COMMERCIAL

## 2024-06-03 VITALS
TEMPERATURE: 97.6 F | OXYGEN SATURATION: 92 % | HEIGHT: 66 IN | DIASTOLIC BLOOD PRESSURE: 72 MMHG | SYSTOLIC BLOOD PRESSURE: 132 MMHG | WEIGHT: 170 LBS | BODY MASS INDEX: 27.32 KG/M2 | HEART RATE: 118 BPM

## 2024-06-03 DIAGNOSIS — J44.9 STAGE 4 VERY SEVERE COPD BY GOLD CLASSIFICATION (HCC): Primary | ICD-10-CM

## 2024-06-03 DIAGNOSIS — J96.11 CHRONIC RESPIRATORY FAILURE WITH HYPOXIA (HCC): ICD-10-CM

## 2024-06-03 DIAGNOSIS — F17.210 CIGARETTE NICOTINE DEPENDENCE WITHOUT COMPLICATION: ICD-10-CM

## 2024-06-03 PROCEDURE — 99214 OFFICE O/P EST MOD 30 MIN: CPT | Performed by: INTERNAL MEDICINE

## 2024-06-03 RX ORDER — ALBUTEROL SULFATE 2.5 MG/3ML
2.5 SOLUTION RESPIRATORY (INHALATION) EVERY 4 HOURS PRN
Qty: 375 ML | Refills: 5 | Status: SHIPPED | OUTPATIENT
Start: 2024-06-03

## 2024-06-03 RX ORDER — ALBUTEROL SULFATE 90 UG/1
2 AEROSOL, METERED RESPIRATORY (INHALATION) EVERY 4 HOURS PRN
Qty: 18 G | Refills: 5 | Status: SHIPPED | OUTPATIENT
Start: 2024-06-03

## 2024-06-03 NOTE — ASSESSMENT & PLAN NOTE
Continues to be symptomatic. No exacerbations since March .  Also with ongoing cigar smoking.    Plan  Continue Breztri- counseled to only use 2 puffs BID  PRN albuterol  Smoking cessation counseling- smokes cigars, no longer smokes cigarettes  Continue 2 lpm of supplemental oxygen with ambulation  Pulmonary rehab- patient declined  Update PFTs- this is due  Up-to-date with vaccines  RTC 6 months

## 2024-06-03 NOTE — PROGRESS NOTES
Pulmonary Outpatient Note   Mathew Bird 64 y.o. male MRN: [id number]  6/3/2024          Reason for Consultation:    Chief Complaint   Patient presents with    COPD         Assessment/Plan:    1. Stage 4 very severe COPD by GOLD classification ([facility])  Assessment & Plan:  Continues to be symptomatic. No exacerbations since March .  Also with ongoing cigar smoking.    Plan  Continue Breztri- counseled to only use 2 puffs BID  PRN albuterol  Smoking cessation counseling- smokes cigars, no longer smokes cigarettes  Continue 2 lpm of supplemental oxygen with ambulation  Pulmonary rehab- patient declined  Update PFTs- this is due  Up-to-date with vaccines  RTC 6 months    Orders:  -     Complete PFT without post bronchodilator; Future  -     albuterol (2.5 mg/3 mL) 0.083 % nebulizer solution; Take 3 mL (2.5 mg total) by nebulization every 4 (four) hours as needed for wheezing or shortness of breath  -     albuterol (PROVENTIL HFA,VENTOLIN HFA) 90 mcg/act inhaler; Inhale 2 puffs every 4 (four) hours as needed for wheezing  2. Cigarette nicotine dependence without complication  3. Chronic respiratory failure with hypoxia ([facility])          Health Maintenance  Immunization History   Administered Date(s) Administered    COVID-19 PFIZER VACCINE 0.3 ML IM 06/25/2021    INFLUENZA 10/21/2016, 10/07/2018    Influenza Quadrivalent, 6-35 Months IM 10/21/2016    Influenza, injectable, quadrivalent, preservative free 0.5 mL 09/15/2023    Influenza, recombinant, quadrivalent,injectable, preservative free 10/07/2018, 03/04/2020    Influenza, seasonal, injectable 09/29/2015    Pneumococcal Conjugate 13-Valent 12/08/2016    Pneumococcal Polysaccharide PPV23 03/04/2020    Respiratory Syncytial Virus Vaccine (Recombinant, Adjuvanted) 12/27/2023    Tdap 07/10/2017, 07/16/2021            Return in about 6 months (around 12/3/2024).    History of Present Illness   HPI:  Mathew Bird is a 64 y.o. male who has COPD who presents for  follow-up.    Last seen in March- was maintained on Breztri, PRN albuterol. I counseled on smoking cessation. He did an O2 titration study and required 2 lpm of oxygen with ambulation.    Smoking- cigars - still continuing this  Previous cigarette smoker.  PFTs ordered in March- not done.  Pulm rehab- Not interested    He reports persistent dyspnea and cough. He is using the Breztri more than prescribed and is running out before refill is due.    He has multiple cats, rabbits, and birds in the home    Review of Systems   Constitutional:  Negative for chills and fever.   HENT:  Negative for ear pain and sore throat.    Eyes:  Negative for pain and visual disturbance.   Respiratory:  Positive for cough and shortness of breath.    Cardiovascular:  Negative for chest pain and palpitations.   Gastrointestinal:  Negative for abdominal pain and vomiting.   Genitourinary:  Negative for dysuria and hematuria.   Musculoskeletal:  Negative for arthralgias and back pain.   Skin:  Negative for color change and rash.   Neurological:  Negative for seizures and syncope.   All other systems reviewed and are negative.          Historical Information   Past Medical History:   Diagnosis Date    Anxiety     Asthma     Cardiac disease     Cervical stenosis (uterine cervix)     Chest pain     Chronic kidney disease     Colitis     Colon polyps     COPD (chronic obstructive pulmonary disease) (HCC)     2L @ HS and PRN    Coronary artery disease     Diverticulitis     Esophageal reflux     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HCC)     Heart failure, systolic, due to idiopathic cardiomyopathy (HCC) 5/21/2018    Hyperlipidemia     Hypertension     IBD (inflammatory bowel disease)     Myocardial infarction (HCC)     Old myocardial infarction     Paroxysmal atrial fibrillation (HCC) 8/24/2018    Perforation of colon (HCC)     Type 2 diabetes, diet controlled (HCC)     Ulcerative colitis (HCC)      Past Surgical History:   Procedure Laterality Date     ANGIOPLASTY      APPENDECTOMY      COLON SURGERY      COLONOSCOPY N/A 10/24/2016    Procedure: COLONOSCOPY;  Surgeon: Tirso Juárez MD;  Location: [facility];  Service:     CORONARY ANGIOPLASTY WITH STENT PLACEMENT      ESOPHAGOGASTRODUODENOSCOPY N/A 10/24/2016    Procedure: ESOPHAGOGASTRODUODENOSCOPY (EGD);  Surgeon: Tirso Juárez MD;  Location: [facility];  Service:     EXPLORATORY LAPAROTOMY W/ BOWEL RESECTION N/A 5/22/2018    Procedure: EXPLORATORY LAPAROTOMY, ILIOCOLECTOMY, ILIOCOLONIC ANASTAMOSIS, LOOP ILIOSTOMY, REPAIR OF SEROSAL TEAR, EXTENSIVE LYSIS OF ADHESIONS, WOUND VAC PLACEMENT;  Surgeon: Tirso Juárez MD;  Location: [facility];  Service: Colorectal    HEMICOLECTOMY      ILEOSTOMY CLOSURE N/A 10/5/2018    Procedure: CLOSURE ILEOSTOMY;  Surgeon: Tirso Juárez MD;  Location: [facility];  Service: Colorectal    OTHER SURGICAL HISTORY      stent indications acute myocardial infarction    IL COLONOSCOPY FLX DX W/COLLJ SPEC WHEN PFRMD N/A 2/6/2018    Procedure: COLONOSCOPY;  Surgeon: Tirso Juárez MD;  Location: [facility];  Service: Colorectal    IL COLONOSCOPY FLX DX W/COLLJ SPEC WHEN PFRMD N/A 10/4/2018    Procedure: COLONOSCOPY;  Surgeon: Tirso Juárez MD;  Location: [facility];  Service: Colorectal    TONSILLECTOMY       Family History   Problem Relation Age of Onset    Coronary artery disease Father     Crohn's disease Father     Stroke Father     Diabetes Family            Meds/Allergies     Current Outpatient Medications:     albuterol (2.5 mg/3 mL) 0.083 % nebulizer solution, Take 3 mL (2.5 mg total) by nebulization every 4 (four) hours as needed for wheezing or shortness of breath, Disp: 375 mL, Rfl: 5    albuterol (PROVENTIL HFA,VENTOLIN HFA) 90 mcg/act inhaler, Inhale 2 puffs every 4 (four) hours as needed for wheezing, Disp: 18 g, Rfl: 5    aspirin ([employer] Aspirin Adult Low Dose) 81 mg chewable tablet, CHEW 1 TABLET BY MOUTH DAILY, Disp: 90 tablet, Rfl: 1    atorvastatin  "(LIPITOR) 40 mg tablet, Take 1 tablet (40 mg total) by mouth daily with dinner, Disp: 90 tablet, Rfl: 3    Budeson-Glycopyrrol-Formoterol (Breztri Aerosphere) 160-9-4.8 MCG/ACT AERO, Inhale 2 puffs 2 (two) times a day Rinse mouth after use, Disp: 32.1 g, Rfl: 3    metoprolol succinate (TOPROL-XL) 25 mg 24 hr tablet, Take 1 tablet (25 mg total) by mouth daily at bedtime Daily at bedtime, Disp: 90 tablet, Rfl: 4    Multiple Vitamins-Minerals (Centrum Silver 50+Men) TABS, Take by mouth, Disp: , Rfl:     Nebulizers (AERONEB GO NEBULIZER HANDSET) MISC, by Does not apply route 2 (two) times a day as needed (wheezing), Disp: 1 each, Rfl: 0    nicotine (NICODERM CQ) 21 mg/24 hr TD 24 hr patch, Place 1 patch on the skin over 24 hours daily, Disp: 28 patch, Rfl: 0    pantoprazole (PROTONIX) 40 mg tablet, TAKE 1 TABLET BY MOUTH EVERY DAY, Disp: 90 tablet, Rfl: 1    sulfaSALAzine (AZULFIDINE) 500 mg tablet, Take 1 tablet (500 mg total) by mouth 4 (four) times a day, Disp: 360 tablet, Rfl: 0  Allergies   Allergen Reactions    Medical Tape     Other      Brown cloth band aids       Latex Rash       Vitals: Blood pressure 132/72, pulse (!) 118, temperature 97.6 °F (36.4 °C), temperature source Tympanic, height 5' 6\" (1.676 m), weight 77.1 kg (170 lb), SpO2 92%. Body mass index is 27.44 kg/m². Oxygen Therapy  SpO2: 92 %  Oxygen Therapy: Supplemental oxygen  O2 Delivery Method: Nasal cannula  O2 Flow Rate (L/min): 4 L/min      Physical Exam  Physical Exam  Vitals and nursing note reviewed.   Constitutional:       General: He is not in acute distress.     Appearance: He is well-developed.   HENT:      Head: Normocephalic and atraumatic.   Eyes:      Conjunctiva/sclera: Conjunctivae normal.   Cardiovascular:      Rate and Rhythm: Normal rate and regular rhythm.      Heart sounds: No murmur heard.  Pulmonary:      Effort: Pulmonary effort is normal. No respiratory distress.      Breath sounds: Normal breath sounds.   Abdominal:      " Palpations: Abdomen is soft.      Tenderness: There is no abdominal tenderness.   Musculoskeletal:         General: No swelling.      Cervical back: Neck supple.      Right lower leg: No edema.      Left lower leg: No edema.   Skin:     General: Skin is warm and dry.      Capillary Refill: Capillary refill takes less than 2 seconds.   Neurological:      Mental Status: He is alert.   Psychiatric:         Mood and Affect: Mood normal.         Labs:   I have personally reviewed pertinent lab results.    ABG:   Lab Results   Component Value Date    PHART 7.411 04/30/2023    LRT9KPM 48.1 (H) 04/30/2023    PO2ART 68.2 (L) 04/30/2023    BJT9MLK 29.9 (H) 04/30/2023    BEART 4.7 04/30/2023    SOURCE Brachial, Right 04/30/2023   ,   BNP:   Lab Results   Component Value Date     (H) 02/03/2024   ,   CBC:  Lab Results   Component Value Date    WBC 11.45 (H) 02/09/2024    HGB 10.8 (L) 02/09/2024    HCT 34.2 (L) 02/09/2024    MCV 94 02/09/2024     02/09/2024    EOSPCT 0 02/06/2024    EOSABS 0.01 02/06/2024    NEUTOPHILPCT 77 (H) 02/06/2024    LYMPHOPCT 12 (L) 02/06/2024   ,   CMP:   Lab Results   Component Value Date    SODIUM 138 02/09/2024    K 4.4 02/09/2024    CL 99 02/09/2024    CO2 34 (H) 02/09/2024    ANIONGAP 6 05/29/2015    BUN 24 02/09/2024    CREATININE 1.32 (H) 02/09/2024    GLUCOSE 92 07/08/2018    CALCIUM 8.0 (L) 02/09/2024    AST 11 (L) 02/06/2024    ALT 16 02/06/2024    ALKPHOS 66 02/06/2024    PROT 7.8 05/21/2015    BILITOT 0.58 05/21/2015    EGFR 57 02/09/2024   ,   PT/INR:   Lab Results   Component Value Date    INR 1.32 (H) 04/28/2023   ,   Troponin:   Lab Results   Component Value Date    TROPONINI <0.02 08/07/2018         Imaging and other studies: I have personally reviewed pertinent reports.   and I have personally reviewed pertinent films in PACS    CXR 2/7/24  No acute cardiopulmonary disease.     CT Chest 4/2023  LUNGS: Examination is limited due to respiratory motion. Moderate  centrilobular emphysema.  There is no tracheal or endobronchial lesion. A 2 mm right middle lobe pulmonary nodule (4/147) and a 3 mm right lower lobe solid pulm nodule (4/193), stable   since 5/15/2018, therefore consistent with a benign etiology. No new or enlarging pulmonary nodules  IMPRESSION:     1. No aortic dissection, intramural hematoma, or aortic aneurysm. Patent mesenteric vessels.     2. Circumferential mural thickening of the esophagus with an air-fluid level, which may relate to esophagitis and esophageal dysmotility.     3. Hepatomegaly with severe hepatic steatosis.    Pulmonary function testing:     Office Spirometry Results: 2020     FEV1/FVC 46%; FVC 1.48 L, 35% predicted; FEV1 0.68 L, 21% predicted   Very severe obstruction       EKG, Pathology, and Other Studies: I have personally reviewed pertinent reports.       TTE 4/26/23    Study Details: This was a technically difficult study.    Left Ventricle: Left ventricular cavity size is normal. Wall thickness is normal. Systolic function is mildly reduced to low normal by visual estimation in the setting of tachycardia.    Right Ventricle: Right ventricular cavity size appears mildly dilated. Systolic function is normal.    Prior TTE study available for comparison. Prior study date: 6/14/2018. No significant changes noted compared to the prior study.    Rony Limon M.D.  [facility]

## 2024-08-13 ENCOUNTER — TELEPHONE (OUTPATIENT)
Age: 64
End: 2024-08-13

## 2024-08-13 NOTE — TELEPHONE ENCOUNTER
Called patient back and advised him there are samples of Breztri in [place] for him to come in. Patient was thankful. He stated he will try to stop by today or have his daughter come by. Thank you.

## 2024-08-13 NOTE — TELEPHONE ENCOUNTER
Patient is calling asking if he is able to come by the [place] office today to receive a sample of Breztri 160 mcg inhaler.  When he gets his refills from the Pharmacy there always seems to be an issue with the medication not coming out properly. He is not due for a refill until 8/28 and hoping we can help him with the sample for the meantime. Please advise

## 2024-08-14 ENCOUNTER — TELEPHONE (OUTPATIENT)
Age: 64
End: 2024-08-14

## 2024-08-14 DIAGNOSIS — J44.9 STAGE 4 VERY SEVERE COPD BY GOLD CLASSIFICATION (HCC): ICD-10-CM

## 2024-08-14 RX ORDER — ALBUTEROL SULFATE 90 UG/1
AEROSOL, METERED RESPIRATORY (INHALATION)
Qty: 18 G | Refills: 5 | Status: SHIPPED | OUTPATIENT
Start: 2024-08-14

## 2024-08-18 DIAGNOSIS — I25.10 CORONARY ARTERY DISEASE INVOLVING NATIVE CORONARY ARTERY OF NATIVE HEART WITHOUT ANGINA PECTORIS: ICD-10-CM

## 2024-08-18 RX ORDER — ATORVASTATIN CALCIUM 40 MG/1
40 TABLET, FILM COATED ORAL
Qty: 30 TABLET | Refills: 0 | Status: SHIPPED | OUTPATIENT
Start: 2024-08-18

## 2024-09-02 ENCOUNTER — APPOINTMENT (EMERGENCY)
Dept: RADIOLOGY | Facility: HOSPITAL | Age: 64
DRG: 190 | End: 2024-09-02
Payer: COMMERCIAL

## 2024-09-02 ENCOUNTER — HOSPITAL ENCOUNTER (INPATIENT)
Facility: HOSPITAL | Age: 64
LOS: 2 days | Discharge: HOME | DRG: 190 | End: 2024-09-04
Attending: EMERGENCY MEDICINE | Admitting: INTERNAL MEDICINE
Payer: COMMERCIAL

## 2024-09-02 DIAGNOSIS — J44.9 STAGE 4 VERY SEVERE COPD BY GOLD CLASSIFICATION (HCC): ICD-10-CM

## 2024-09-02 DIAGNOSIS — J40 BRONCHITIS: ICD-10-CM

## 2024-09-02 DIAGNOSIS — J96.20 ACUTE ON CHRONIC RESPIRATORY FAILURE (HCC): ICD-10-CM

## 2024-09-02 DIAGNOSIS — I50.9 CHF (CONGESTIVE HEART FAILURE) (HCC): ICD-10-CM

## 2024-09-02 DIAGNOSIS — J44.1 COPD WITH ACUTE EXACERBATION (HCC): Primary | ICD-10-CM

## 2024-09-02 LAB
2HR DELTA HS TROPONIN: -3 NG/L
4HR DELTA HS TROPONIN: -17 NG/L
ALBUMIN SERPL BCG-MCNC: 3.8 G/DL (ref 3.5–5)
ALP SERPL-CCNC: 95 U/L (ref 34–104)
ALT SERPL W P-5'-P-CCNC: 27 U/L (ref 7–52)
ANION GAP SERPL CALCULATED.3IONS-SCNC: 13 MMOL/L (ref 4–13)
APTT PPP: 30 SECONDS (ref 23–34)
AST SERPL W P-5'-P-CCNC: 25 U/L (ref 13–39)
ATRIAL RATE: 126 BPM
BASOPHILS # BLD AUTO: 0.04 THOUSANDS/ÂΜL (ref 0–0.1)
BASOPHILS NFR BLD AUTO: 0 % (ref 0–1)
BILIRUB SERPL-MCNC: 0.57 MG/DL (ref 0.2–1)
BNP SERPL-MCNC: 108 PG/ML (ref 0–100)
BUN SERPL-MCNC: 19 MG/DL (ref 5–25)
CALCIUM SERPL-MCNC: 8.5 MG/DL (ref 8.4–10.2)
CARDIAC TROPONIN I PNL SERPL HS: 41 NG/L
CARDIAC TROPONIN I PNL SERPL HS: 55 NG/L
CARDIAC TROPONIN I PNL SERPL HS: 58 NG/L
CHLORIDE SERPL-SCNC: 102 MMOL/L (ref 96–108)
CO2 SERPL-SCNC: 26 MMOL/L (ref 21–32)
CREAT SERPL-MCNC: 1.96 MG/DL (ref 0.6–1.3)
D DIMER PPP FEU-MCNC: 4.14 UG/ML FEU
EOSINOPHIL # BLD AUTO: 0 THOUSAND/ÂΜL (ref 0–0.61)
EOSINOPHIL NFR BLD AUTO: 0 % (ref 0–6)
ERYTHROCYTE [DISTWIDTH] IN BLOOD BY AUTOMATED COUNT: 21.1 % (ref 11.6–15.1)
GFR SERPL CREATININE-BSD FRML MDRD: 35 ML/MIN/1.73SQ M
GLUCOSE SERPL-MCNC: 178 MG/DL (ref 65–140)
HCT VFR BLD AUTO: 44.8 % (ref 36.5–49.3)
HGB BLD-MCNC: 13.7 G/DL (ref 12–17)
IMM GRANULOCYTES # BLD AUTO: 0.07 THOUSAND/UL (ref 0–0.2)
IMM GRANULOCYTES NFR BLD AUTO: 1 % (ref 0–2)
INR PPP: 1.01 (ref 0.85–1.19)
LACTATE SERPL-SCNC: 1 MMOL/L (ref 0.5–2)
LYMPHOCYTES # BLD AUTO: 0.83 THOUSANDS/ÂΜL (ref 0.6–4.47)
LYMPHOCYTES NFR BLD AUTO: 7 % (ref 14–44)
MCH RBC QN AUTO: 24.5 PG (ref 26.8–34.3)
MCHC RBC AUTO-ENTMCNC: 30.6 G/DL (ref 31.4–37.4)
MCV RBC AUTO: 80 FL (ref 82–98)
MONOCYTES # BLD AUTO: 0.84 THOUSAND/ÂΜL (ref 0.17–1.22)
MONOCYTES NFR BLD AUTO: 7 % (ref 4–12)
NEUTROPHILS # BLD AUTO: 9.7 THOUSANDS/ÂΜL (ref 1.85–7.62)
NEUTS SEG NFR BLD AUTO: 85 % (ref 43–75)
NRBC BLD AUTO-RTO: 0 /100 WBCS
P AXIS: 77 DEGREES
PLATELET # BLD AUTO: 163 THOUSANDS/UL (ref 149–390)
PMV BLD AUTO: 10.8 FL (ref 8.9–12.7)
POTASSIUM SERPL-SCNC: 4.5 MMOL/L (ref 3.5–5.3)
PR INTERVAL: 122 MS
PROCALCITONIN SERPL-MCNC: 1.12 NG/ML
PROT SERPL-MCNC: 6.7 G/DL (ref 6.4–8.4)
PROTHROMBIN TIME: 13.6 SECONDS (ref 12.3–15)
QRS AXIS: 89 DEGREES
QRSD INTERVAL: 100 MS
QT INTERVAL: 316 MS
QTC INTERVAL: 457 MS
RBC # BLD AUTO: 5.59 MILLION/UL (ref 3.88–5.62)
SODIUM SERPL-SCNC: 141 MMOL/L (ref 135–147)
T WAVE AXIS: 72 DEGREES
VENTRICULAR RATE: 126 BPM
WBC # BLD AUTO: 11.48 THOUSAND/UL (ref 4.31–10.16)

## 2024-09-02 PROCEDURE — 71275 CT ANGIOGRAPHY CHEST: CPT

## 2024-09-02 PROCEDURE — 87040 BLOOD CULTURE FOR BACTERIA: CPT

## 2024-09-02 PROCEDURE — 94760 N-INVAS EAR/PLS OXIMETRY 1: CPT

## 2024-09-02 PROCEDURE — 80053 COMPREHEN METABOLIC PANEL: CPT

## 2024-09-02 PROCEDURE — 85379 FIBRIN DEGRADATION QUANT: CPT

## 2024-09-02 PROCEDURE — 84145 PROCALCITONIN (PCT): CPT

## 2024-09-02 PROCEDURE — 99291 CRITICAL CARE FIRST HOUR: CPT | Performed by: EMERGENCY MEDICINE

## 2024-09-02 PROCEDURE — 83880 ASSAY OF NATRIURETIC PEPTIDE: CPT

## 2024-09-02 PROCEDURE — 96366 THER/PROPH/DIAG IV INF ADDON: CPT

## 2024-09-02 PROCEDURE — 85025 COMPLETE CBC W/AUTO DIFF WBC: CPT

## 2024-09-02 PROCEDURE — 96367 TX/PROPH/DG ADDL SEQ IV INF: CPT

## 2024-09-02 PROCEDURE — 96375 TX/PRO/DX INJ NEW DRUG ADDON: CPT

## 2024-09-02 PROCEDURE — 85610 PROTHROMBIN TIME: CPT

## 2024-09-02 PROCEDURE — 87154 CUL TYP ID BLD PTHGN 6+ TRGT: CPT

## 2024-09-02 PROCEDURE — 85730 THROMBOPLASTIN TIME PARTIAL: CPT

## 2024-09-02 PROCEDURE — 93005 ELECTROCARDIOGRAM TRACING: CPT

## 2024-09-02 PROCEDURE — 94640 AIRWAY INHALATION TREATMENT: CPT

## 2024-09-02 PROCEDURE — 94664 DEMO&/EVAL PT USE INHALER: CPT

## 2024-09-02 PROCEDURE — 96365 THER/PROPH/DIAG IV INF INIT: CPT

## 2024-09-02 PROCEDURE — 94002 VENT MGMT INPAT INIT DAY: CPT

## 2024-09-02 PROCEDURE — 83605 ASSAY OF LACTIC ACID: CPT

## 2024-09-02 PROCEDURE — 93010 ELECTROCARDIOGRAM REPORT: CPT | Performed by: INTERNAL MEDICINE

## 2024-09-02 PROCEDURE — 71045 X-RAY EXAM CHEST 1 VIEW: CPT

## 2024-09-02 PROCEDURE — 99285 EMERGENCY DEPT VISIT HI MDM: CPT

## 2024-09-02 PROCEDURE — 99223 1ST HOSP IP/OBS HIGH 75: CPT | Performed by: INTERNAL MEDICINE

## 2024-09-02 PROCEDURE — 84484 ASSAY OF TROPONIN QUANT: CPT

## 2024-09-02 PROCEDURE — 36415 COLL VENOUS BLD VENIPUNCTURE: CPT

## 2024-09-02 RX ORDER — SODIUM CHLORIDE FOR INHALATION 0.9 %
3 VIAL, NEBULIZER (ML) INHALATION
Status: DISCONTINUED | OUTPATIENT
Start: 2024-09-02 | End: 2024-09-02

## 2024-09-02 RX ORDER — NICOTINE 21 MG/24HR
1 PATCH, TRANSDERMAL 24 HOURS TRANSDERMAL DAILY
Status: DISCONTINUED | OUTPATIENT
Start: 2024-09-02 | End: 2024-09-04 | Stop reason: HOSPADM

## 2024-09-02 RX ORDER — ACETAMINOPHEN 10 MG/ML
1000 INJECTION, SOLUTION INTRAVENOUS ONCE
Status: COMPLETED | OUTPATIENT
Start: 2024-09-02 | End: 2024-09-02

## 2024-09-02 RX ORDER — ASPIRIN 81 MG/1
81 TABLET, CHEWABLE ORAL DAILY
Status: DISCONTINUED | OUTPATIENT
Start: 2024-09-03 | End: 2024-09-04 | Stop reason: HOSPADM

## 2024-09-02 RX ORDER — ACETAMINOPHEN 325 MG/1
650 TABLET ORAL EVERY 6 HOURS PRN
Status: DISCONTINUED | OUTPATIENT
Start: 2024-09-02 | End: 2024-09-04 | Stop reason: HOSPADM

## 2024-09-02 RX ORDER — METHYLPREDNISOLONE SODIUM SUCCINATE 40 MG/ML
40 INJECTION, POWDER, LYOPHILIZED, FOR SOLUTION INTRAMUSCULAR; INTRAVENOUS EVERY 8 HOURS
Status: DISCONTINUED | OUTPATIENT
Start: 2024-09-02 | End: 2024-09-02

## 2024-09-02 RX ORDER — SODIUM CHLORIDE 9 MG/ML
75 INJECTION, SOLUTION INTRAVENOUS CONTINUOUS
Status: DISCONTINUED | OUTPATIENT
Start: 2024-09-02 | End: 2024-09-04

## 2024-09-02 RX ORDER — HEPARIN SODIUM 5000 [USP'U]/ML
5000 INJECTION, SOLUTION INTRAVENOUS; SUBCUTANEOUS EVERY 8 HOURS SCHEDULED
Status: DISCONTINUED | OUTPATIENT
Start: 2024-09-03 | End: 2024-09-04 | Stop reason: HOSPADM

## 2024-09-02 RX ORDER — BENZONATATE 100 MG/1
100 CAPSULE ORAL 3 TIMES DAILY PRN
Status: DISCONTINUED | OUTPATIENT
Start: 2024-09-02 | End: 2024-09-04 | Stop reason: HOSPADM

## 2024-09-02 RX ORDER — LEVALBUTEROL INHALATION SOLUTION 1.25 MG/3ML
1.25 SOLUTION RESPIRATORY (INHALATION)
Status: DISCONTINUED | OUTPATIENT
Start: 2024-09-02 | End: 2024-09-04 | Stop reason: HOSPADM

## 2024-09-02 RX ORDER — METHYLPREDNISOLONE SOD SUCC 125 MG
1 VIAL (EA) INJECTION ONCE
Status: COMPLETED | OUTPATIENT
Start: 2024-09-02 | End: 2024-09-02

## 2024-09-02 RX ORDER — LEVALBUTEROL INHALATION SOLUTION 1.25 MG/3ML
1.25 SOLUTION RESPIRATORY (INHALATION)
Status: DISCONTINUED | OUTPATIENT
Start: 2024-09-02 | End: 2024-09-02

## 2024-09-02 RX ORDER — ATORVASTATIN CALCIUM 40 MG/1
40 TABLET, FILM COATED ORAL
Status: DISCONTINUED | OUTPATIENT
Start: 2024-09-02 | End: 2024-09-04 | Stop reason: HOSPADM

## 2024-09-02 RX ORDER — MAGNESIUM SULFATE HEPTAHYDRATE 40 MG/ML
2 INJECTION, SOLUTION INTRAVENOUS ONCE
Status: COMPLETED | OUTPATIENT
Start: 2024-09-02 | End: 2024-09-02

## 2024-09-02 RX ORDER — MAGNESIUM SULFATE HEPTAHYDRATE 40 MG/ML
1 INJECTION, SOLUTION INTRAVENOUS ONCE
Status: COMPLETED | OUTPATIENT
Start: 2024-09-02 | End: 2024-09-02

## 2024-09-02 RX ORDER — ALBUTEROL SULFATE 2.5 MG/3ML
1 SOLUTION RESPIRATORY (INHALATION) ONCE
Status: COMPLETED | OUTPATIENT
Start: 2024-09-02 | End: 2024-09-02

## 2024-09-02 RX ORDER — METOPROLOL SUCCINATE 25 MG/1
25 TABLET, EXTENDED RELEASE ORAL
Status: DISCONTINUED | OUTPATIENT
Start: 2024-09-02 | End: 2024-09-04 | Stop reason: HOSPADM

## 2024-09-02 RX ORDER — METHYLPREDNISOLONE SODIUM SUCCINATE 40 MG/ML
40 INJECTION, POWDER, LYOPHILIZED, FOR SOLUTION INTRAMUSCULAR; INTRAVENOUS EVERY 8 HOURS
Status: DISCONTINUED | OUTPATIENT
Start: 2024-09-02 | End: 2024-09-03

## 2024-09-02 RX ORDER — SULFASALAZINE 500 MG/1
500 TABLET ORAL 4 TIMES DAILY
Status: DISCONTINUED | OUTPATIENT
Start: 2024-09-02 | End: 2024-09-04 | Stop reason: HOSPADM

## 2024-09-02 RX ORDER — BUDESONIDE AND FORMOTEROL FUMARATE DIHYDRATE 160; 4.5 UG/1; UG/1
2 AEROSOL RESPIRATORY (INHALATION) 2 TIMES DAILY
Status: DISCONTINUED | OUTPATIENT
Start: 2024-09-02 | End: 2024-09-04 | Stop reason: HOSPADM

## 2024-09-02 RX ORDER — ALBUTEROL SULFATE 5 MG/ML
10 SOLUTION RESPIRATORY (INHALATION) ONCE
Status: COMPLETED | OUTPATIENT
Start: 2024-09-02 | End: 2024-09-02

## 2024-09-02 RX ORDER — SODIUM CHLORIDE FOR INHALATION 0.9 %
12 VIAL, NEBULIZER (ML) INHALATION ONCE
Status: COMPLETED | OUTPATIENT
Start: 2024-09-02 | End: 2024-09-02

## 2024-09-02 RX ORDER — PANTOPRAZOLE SODIUM 40 MG/1
40 TABLET, DELAYED RELEASE ORAL DAILY
Status: DISCONTINUED | OUTPATIENT
Start: 2024-09-03 | End: 2024-09-04 | Stop reason: HOSPADM

## 2024-09-02 RX ORDER — IPRATROPIUM BROMIDE AND ALBUTEROL SULFATE 2.5; .5 MG/3ML; MG/3ML
3 SOLUTION RESPIRATORY (INHALATION) ONCE
Status: COMPLETED | OUTPATIENT
Start: 2024-09-02 | End: 2024-09-02

## 2024-09-02 RX ORDER — IPRATROPIUM BROMIDE AND ALBUTEROL SULFATE .5; 3 MG/3ML; MG/3ML
1 SOLUTION RESPIRATORY (INHALATION) ONCE
Status: COMPLETED | OUTPATIENT
Start: 2024-09-02 | End: 2024-09-02

## 2024-09-02 RX ORDER — ALBUTEROL SULFATE 90 UG/1
2 AEROSOL, METERED RESPIRATORY (INHALATION) EVERY 4 HOURS PRN
Status: DISCONTINUED | OUTPATIENT
Start: 2024-09-02 | End: 2024-09-04 | Stop reason: HOSPADM

## 2024-09-02 RX ADMIN — MAGNESIUM SULFATE HEPTAHYDRATE 2 G: 40 INJECTION, SOLUTION INTRAVENOUS at 10:51

## 2024-09-02 RX ADMIN — AZITHROMYCIN MONOHYDRATE 500 MG: 500 INJECTION, POWDER, LYOPHILIZED, FOR SOLUTION INTRAVENOUS at 12:43

## 2024-09-02 RX ADMIN — IOHEXOL 85 ML: 350 INJECTION, SOLUTION INTRAVENOUS at 13:53

## 2024-09-02 RX ADMIN — SODIUM CHLORIDE 100 ML/HR: 0.9 INJECTION, SOLUTION INTRAVENOUS at 15:58

## 2024-09-02 RX ADMIN — BUDESONIDE AND FORMOTEROL FUMARATE DIHYDRATE 2 PUFF: 160; 4.5 AEROSOL RESPIRATORY (INHALATION) at 20:00

## 2024-09-02 RX ADMIN — IPRATROPIUM BROMIDE 0.5 MG: 0.5 SOLUTION RESPIRATORY (INHALATION) at 20:07

## 2024-09-02 RX ADMIN — SULFASALAZINE 500 MG: 500 TABLET ORAL at 20:01

## 2024-09-02 RX ADMIN — NICOTINE 1 PATCH: 14 PATCH, EXTENDED RELEASE TRANSDERMAL at 15:52

## 2024-09-02 RX ADMIN — ALBUTEROL SULFATE 10 MG: 2.5 SOLUTION RESPIRATORY (INHALATION) at 12:08

## 2024-09-02 RX ADMIN — LEVALBUTEROL HYDROCHLORIDE 1.25 MG: 1.25 SOLUTION RESPIRATORY (INHALATION) at 20:07

## 2024-09-02 RX ADMIN — CEFTRIAXONE SODIUM 1000 MG: 10 INJECTION, POWDER, FOR SOLUTION INTRAVENOUS at 15:52

## 2024-09-02 RX ADMIN — METOPROLOL SUCCINATE 25 MG: 25 TABLET, EXTENDED RELEASE ORAL at 22:02

## 2024-09-02 RX ADMIN — SODIUM CHLORIDE 100 ML/HR: 0.9 INJECTION, SOLUTION INTRAVENOUS at 22:02

## 2024-09-02 RX ADMIN — ISODIUM CHLORIDE 12 ML: 0.03 SOLUTION RESPIRATORY (INHALATION) at 12:09

## 2024-09-02 RX ADMIN — SODIUM CHLORIDE 500 ML: 0.9 INJECTION, SOLUTION INTRAVENOUS at 10:51

## 2024-09-02 RX ADMIN — IPRATROPIUM BROMIDE AND ALBUTEROL SULFATE 3 ML: 2.5; .5 SOLUTION RESPIRATORY (INHALATION) at 09:51

## 2024-09-02 RX ADMIN — ACETAMINOPHEN 1000 MG: 10 INJECTION INTRAVENOUS at 10:11

## 2024-09-02 RX ADMIN — METHYLPREDNISOLONE SODIUM SUCCINATE 40 MG: 40 INJECTION, POWDER, FOR SOLUTION INTRAMUSCULAR; INTRAVENOUS at 15:52

## 2024-09-02 RX ADMIN — IPRATROPIUM BROMIDE 1 MG: 0.5 SOLUTION RESPIRATORY (INHALATION) at 12:09

## 2024-09-02 NOTE — ED ATTENDING ATTESTATION
9/2/2024  I, Meg Matthew MD, saw and evaluated the patient. I have discussed the patient with the resident/non-physician practitioner and agree with the resident's/non-physician practitioner's findings, Plan of Care, and MDM as documented in the resident's/non-physician practitioner's note, except where noted. All available labs and Radiology studies were reviewed.  I was present for key portions of any procedure(s) performed by the resident/non-physician practitioner and I was immediately available to provide assistance.       At this point I agree with the current assessment done in the Emergency Department.  I have conducted an independent evaluation of this patient a history and physical is as follows:  64-year-old male here with shortness of breath.  Patient with history of COPD, chronic ongoing smoker of cigars, 4 L of nasal cannula oxygen at home.  Patient told EMS that he had run out of his home asthma medications.  The patient tells me that he has been feeling unwell for a couple of days with viral symptoms, cough, change in sputum, increasing dyspnea.  States that his symptoms have been going on for 3 days.  States that he ran out of his meds because he has been using them more frequently because of ongoing worsening shortness of breath.  He denies chest pain.  He has been having some chills at home.  No abdominal pain.  He has had some nausea but is not currently vomiting.  He states he did vomit yesterday.  No diarrhea, abdominal pain, chest pain, lateralizing limb swelling.  Review of systems otherwise negative in 12 systems reviewed.  EMS was independently interviewed, they state that patient was initially cyanotic with an oxygen saturation of 91% but improved with the CPAP en route.  He was given DuoNeb, Solu-Medrol, and 2 g of magnesium en route to the emergency department.  On exam the patient is ill-appearing.  He is tripoding and tachypneic with a respiratory rate in the low 30s.  He has  cyanotic fingers.  He has pursed lip breathing.  The patient is awake and alert with normal mentation.  He has adequate perfusion.  On secondary survey, the patient has no scleral icterus.  His mucous membranes are a little bit tacky.  His neck is supple and nontender.  He does not have any subcutaneous air.  His heart is tachycardic and intermittently irregular.  He is intermittently in trigeminy on the monitor.  His lungs are wheezy and decreased bilaterally with overall poor air movement.  His abdomen is soft and nontender with no masses, rebound, guarding.  His extremities are intact.  He does not have lateralizing edema.  He is neurologically nonfocal with normal skin and back exam.MEDICAL DECISION MAKING    Number and Complexity of Problems  Differential diagnosis: Respiratory failure secondary to COPD, differential also includes pneumonia, PE, cardiac    Medical Decision Making Data  External documents reviewed: Patient's last pulmonology note from June reviewed, patient was at that time only on 2 L of nasal cannula oxygen, and had declined pulmonary rehab  My EKG interpretation: P mitrale, nonspecific intraventricular conduction delay, tachycardia, frequent PVCs  My CT interpretation: No pulmonary embolism  My X-ray interpretation: No focal consolidation  My ultrasound interpretation:     CTA ED chest PE study   Final Result      1. No pulmonary embolism.      2. Bilateral predominantly lower lobe mild bronchial wall thickening with foci of mucous plugging may be related to bronchitis. Mild left basilar atelectasis.      3. Scattered small groundglass opacities may be inflammatory.   Based on current Fleischner Society 2017 Guidelines on incidental pulmonary nodule, followup noncontrast CT is recommended at 6-12 months from the initial examination to exclude persistence. A thin-walled air-filled structure in the left lower lobe    measuring 1.3 cm can be reassessed on follow-up.      3. Moderate emphysema.          The study was marked in EPIC for immediate notification.                     Workstation performed: [id number]         XR chest 1 view portable   ED Interpretation   Hyperinflated lungs, no sign of focal consolidation suggestive of pneumonia          Labs Reviewed   CBC AND DIFFERENTIAL - Abnormal       Result Value Ref Range Status    WBC 11.48 (*) 4.31 - 10.16 Thousand/uL Final    RBC 5.59  3.88 - 5.62 Million/uL Final    Hemoglobin 13.7  12.0 - 17.0 g/dL Final    Hematocrit 44.8  36.5 - 49.3 % Final    MCV 80 (*) 82 - 98 fL Final    MCH 24.5 (*) 26.8 - 34.3 pg Final    MCHC 30.6 (*) 31.4 - 37.4 g/dL Final    RDW 21.1 (*) 11.6 - 15.1 % Final    MPV 10.8  8.9 - 12.7 fL Final    Platelets 163  149 - 390 Thousands/uL Final    nRBC 0  /100 WBCs Final    Segmented % 85 (*) 43 - 75 % Final    Immature Grans % 1  0 - 2 % Final    Lymphocytes % 7 (*) 14 - 44 % Final    Monocytes % 7  4 - 12 % Final    Eosinophils Relative 0  0 - 6 % Final    Basophils Relative 0  0 - 1 % Final    Absolute Neutrophils 9.70 (*) 1.85 - 7.62 Thousands/µL Final    Absolute Immature Grans 0.07  0.00 - 0.20 Thousand/uL Final    Absolute Lymphocytes 0.83  0.60 - 4.47 Thousands/µL Final    Absolute Monocytes 0.84  0.17 - 1.22 Thousand/µL Final    Eosinophils Absolute 0.00  0.00 - 0.61 Thousand/µL Final    Basophils Absolute 0.04  0.00 - 0.10 Thousands/µL Final   COMPREHENSIVE METABOLIC PANEL - Abnormal    Sodium 141  135 - 147 mmol/L Final    Potassium 4.5  3.5 - 5.3 mmol/L Final    Chloride 102  96 - 108 mmol/L Final    CO2 26  21 - 32 mmol/L Final    ANION GAP 13  4 - 13 mmol/L Final    BUN 19  5 - 25 mg/dL Final    Creatinine 1.96 (*) 0.60 - 1.30 mg/dL Final    Comment: Standardized to IDMS reference method    Glucose 178 (*) 65 - 140 mg/dL Final    Comment: If the patient is fasting, the ADA then defines impaired fasting glucose as > 100 mg/dL and diabetes as > or equal to 123 mg/dL.    Calcium 8.5  8.4 - 10.2 mg/dL Final    AST 25  13  - 39 U/L Final    ALT 27  7 - 52 U/L Final    Comment: Specimen collection should occur prior to Sulfasalazine administration due to the potential for falsely depressed results.     Alkaline Phosphatase 95  34 - 104 U/L Final    Total Protein 6.7  6.4 - 8.4 g/dL Final    Albumin 3.8  3.5 - 5.0 g/dL Final    Total Bilirubin 0.57  0.20 - 1.00 mg/dL Final    Comment: Use of this assay is not recommended for patients undergoing treatment with eltrombopag due to the potential for falsely elevated results.  N-acetyl-p-benzoquinone imine (metabolite of Acetaminophen) will generate erroneously low results in samples for patients that have taken an overdose of Acetaminophen.    eGFR 35  ml/min/1.73sq m Final    Narrative:     National Kidney Disease Foundation guidelines for Chronic Kidney Disease (CKD):     Stage 1 with normal or high GFR (GFR > 90 mL/min/1.73 square meters)    Stage 2 Mild CKD (GFR = 60-89 mL/min/1.73 square meters)    Stage 3A Moderate CKD (GFR = 45-59 mL/min/1.73 square meters)    Stage 3B Moderate CKD (GFR = 30-44 mL/min/1.73 square meters)    Stage 4 Severe CKD (GFR = 15-29 mL/min/1.73 square meters)    Stage 5 End Stage CKD (GFR <15 mL/min/1.73 square meters)  Note: GFR calculation is accurate only with a steady state creatinine   D-DIMER, QUANTITATIVE - Abnormal    D-Dimer, Quant 4.14 (*) <0.50 ug/ml FEU Final    Comment: Reference and upper limits to exclude DVT and PE are the same.  Do not use to exclude if clinical symptoms are present.    Narrative:     In the evaluation for possible pulmonary embolism, in the appropriate (Well's Score of 4 or less) patient, the age adjusted d-dimer cutoff for this patient can be calculated as:    Age x 0.01 (in ug/mL) for Age-adjusted D-dimer exclusion threshold for a patient over 50 years.   PROCALCITONIN TEST - Abnormal    Procalcitonin 1.12 (*) <=0.25 ng/ml Final    Comment: Suspected Lower Respiratory Tract Infection (LRTI):  - LESS than or EQUAL to 0.25  ng/mL:   low likelihood for bacterial LRTI; antibiotics DISCOURAGED.  - GREATER than 0.25 ng/mL:   increased likelihood for bacterial LRTI; antibiotics ENCOURAGED.    Suspected Sepsis:  - Strongly consider initiating antibiotics in ALL UNSTABLE patients.  - LESS than or EQUAL to 0.5 ng/mL:   low likelihood for bacterial sepsis; antibiotics DISCOURAGED.  - GREATER than 0.5 ng/mL:   increased likelihood for bacterial sepsis; antibiotics ENCOURAGED.  - GREATER than 2 ng/mL:   high risk for severe sepsis / septic shock; antibiotics strongly ENCOURAGED.    Decisions on antibiotic use should not be based solely on Procalcitonin (PCT) levels. If PCT is low but uncertainty exists with stopping antibiotics, repeat PCT in 6-24 hours to confirm the low level. If antibiotics are administered (regardless if initial PCT was high or low), repeat PCT every 1-2 days to consider early antibiotic cessation (when GREATER than 80% decrease from the peak OR when PCT drops below designated cutoffs, whichever comes first), so long as the infection is NOT one that typically requires prolonged treatment durations (e.g., bone/joint infections, endocarditis, Staph. aureus bacteremia).    Situations of FALSE-POSITIVE Procalcitonin values:  1) Newborns < 72 hours old  2) Massive stress from severe trauma / burns, major surgery, acute pancreatitis, cardiogenic / hemorrhagic shock, sickle cell crisis, or other organ perfusion abnormalities  3) Malaria and some Candidal infections  4) Treatment with agents that stimulate cytokines (e.g., OKT3, anti-lymphocyte globulins, alemtuzumab, IL-2, granulocyte transfusion [NOT GCSFs])  5) Chronic renal disease causes elevated baseline levels (consider GREATER than 0.75 ng/mL as an abnormal cut-off); initiating HD/CRRT may cause transient decreases  6) Paraneoplastic syndromes from medullary thyroid or SCLC, some forms of vasculitis, and acute gzlzi-mm-homi disease    Situations of FALSE-NEGATIVE  "Procalcitonin values:  1) Too early in clinical course for PCT to have reached its peak (may repeat in 6-24 hours to confirm low level)  2) Localized infection WITHOUT systemic (SIRS / sepsis) response (e.g., an abscess, osteomyelitis, cystitis)  3) Mycobacteria (e.g., Tuberculosis, MAC)  4) Cystic fibrosis exacerbations     HS TROPONIN I 0HR - Abnormal    hs TnI 0hr 58 (*) \"Refer to ACS Flowchart\"- see link ng/L Final    Comment:                                              Initial (time 0) result  If >=50 ng/L, Myocardial injury suggested ;  Type of myocardial injury and treatment strategy  to be determined.  If 5-49 ng/L, a delta result at 2 hours and or 4 hours will be needed to further evaluate.  If <4 ng/L, and chest pain has been >3 hours since onset, patient may qualify for discharge based on the HEART score in the ED.  If <5 ng/L and <3hours since onset of chest pain, a delta result at 2 hours will be needed to further evaluate.    HS Troponin 99th Percentile URL of a Health Population=12 ng/L with a 95% Confidence Interval of 8-18 ng/L.    Second Troponin (time 2 hours)  If calculated delta >= 20 ng/L,  Myocardial injury suggested ; Type of myocardial injury and treatment strategy to be determined.  If 5-49 ng/L and the calculated delta is 5-19 ng/L, consult medical service for evaluation.  Continue evaluation for ischemia on ecg and other possible etiology and repeat hs troponin at 4 hours.  If delta is <5 ng/L at 2 hours, consider discharge based on risk stratification via the HEART score (if in ED), or BARBER risk score in IP/Observation.    HS Troponin 99th Percentile URL of a Health Population=12 ng/L with a 95% Confidence Interval of 8-18 ng/L.   B-TYPE NATRIURETIC PEPTIDE (BNP) - Abnormal     (*) 0 - 100 pg/mL Final   HS TROPONIN I 2HR - Abnormal    hs TnI 2hr 55 (*) \"Refer to ACS Flowchart\"- see link ng/L Final    Comment:                                              Initial (time 0) " result  If >=50 ng/L, Myocardial injury suggested ;  Type of myocardial injury and treatment strategy  to be determined.  If 5-49 ng/L, a delta result at 2 hours and or 4 hours will be needed to further evaluate.  If <4 ng/L, and chest pain has been >3 hours since onset, patient may qualify for discharge based on the HEART score in the ED.  If <5 ng/L and <3hours since onset of chest pain, a delta result at 2 hours will be needed to further evaluate.    HS Troponin 99th Percentile URL of a Health Population=12 ng/L with a 95% Confidence Interval of 8-18 ng/L.    Second Troponin (time 2 hours)  If calculated delta >= 20 ng/L,  Myocardial injury suggested ; Type of myocardial injury and treatment strategy to be determined.  If 5-49 ng/L and the calculated delta is 5-19 ng/L, consult medical service for evaluation.  Continue evaluation for ischemia on ecg and other possible etiology and repeat hs troponin at 4 hours.  If delta is <5 ng/L at 2 hours, consider discharge based on risk stratification via the HEART score (if in ED), or BARBER risk score in IP/Observation.    HS Troponin 99th Percentile URL of a Health Population=12 ng/L with a 95% Confidence Interval of 8-18 ng/L.    Delta 2hr hsTnI -3  <20 ng/L Final   LACTIC ACID, PLASMA (W/REFLEX IF RESULT > 2.0) - Normal    LACTIC ACID 1.0  0.5 - 2.0 mmol/L Final    Narrative:     Result may be elevated if tourniquet was used during collection.   PROTIME-INR - Normal    Protime 13.6  12.3 - 15.0 seconds Final    INR 1.01  0.85 - 1.19 Final    Narrative:     INR Therapeutic Range    Indication                                             INR Range      Atrial Fibrillation                                               2.0-3.0  Hypercoagulable State                                    2.0.2.3  Left Ventricular Asist Device                            2.0-3.0  Mechanical Heart Valve                                  -    Aortic(with afib, MI, embolism, HF, LA enlargement,     and/or coagulopathy)                                     2.0-3.0 (2.5-3.5)     Mitral                                                             2.5-3.5  Prosthetic/Bioprosthetic Heart Valve               2.0-3.0  Venous thromboembolism (VTE: VT, PE        2.0-3.0   APTT - Normal    PTT 30  23 - 34 seconds Final    Comment: Therapeutic Heparin Range =  60-90 seconds   BLOOD CULTURE   BLOOD CULTURE   UA W REFLEX TO MICROSCOPIC WITH REFLEX TO CULTURE   HS TROPONIN I 4HR       Labs reviewed by me are significant for: Elevated D-dimer, elevated troponin    Clinical decision rules/scores are significant for:     Discussed case with:   Considered admission for: Hypoxic respiratory failure, COPD exacerbation, fever    Treatment and Disposition  ED course: Patient seen and examined here, patient febrile, increased work of breathing.  Patient given antipyretics and placed on BiPAP.  Patient given inline continuous bronchodilators, further magnesium, given antibiotics because of change in sputum in the setting of severe COPD.  Reassessment: Patient feeling better, weaned off BiPAP, placed on facemask.  Patient with positive D-dimer, will CT. CT negative for pulmonary embolus.  Patient improved, weaned to facemask.  Will admit to the hospital for 1 hypoxic respiratory failure, 2 COPD exacerbation  Shared decision making:   Code status:     ED Course         Critical Care Time  CriticalCare Time    Date/Time: 9/2/2024 1:52 PM    Performed by: Meg Matthew MD  Authorized by: Meg Matthew MD    Critical care provider statement:     Critical care time (minutes):  48    Critical care time was exclusive of:  Separately billable procedures and treating other patients and teaching time    Critical care was necessary to treat or prevent imminent or life-threatening deterioration of the following conditions:  Respiratory failure    Critical care was time spent personally by me on the following activities:  Blood draw  for specimens, development of treatment plan with patient or surrogate, discussions with consultants, obtaining history from patient or surrogate, discussions with primary provider, evaluation of patient's response to treatment, examination of patient, re-evaluation of patient's condition, review of old charts, ordering and review of radiographic studies, ordering and review of laboratory studies and ordering and performing treatments and interventions

## 2024-09-02 NOTE — ASSESSMENT & PLAN NOTE
Presented with shortness of breath and diffuse wheezing initially requiring continuous BiPAP since transition to nasal cannula   reports ran out of medication approximately 3 days ago   likely in setting of acute bronchitis as noted on CT scan  At baseline on 4 L nasal cannula  Will provide ceftriaxone  Will place on IV steroids 40 3 times daily  Scheduled nebs  Wean oxygen as able

## 2024-09-02 NOTE — H&P
[facility], [place]  H&P  Name: Mathew Bird 64 y.o. male I MRN: [id number]  Unit/Bed#: ED 20 I Date of Admission: 9/2/2024   Date of Service: 9/2/2024 I Hospital Day: 0      Assessment & Plan   * Acute exacerbation of chronic obstructive pulmonary disease (COPD) ([facility])  Assessment & Plan  Presented with shortness of breath and diffuse wheezing initially requiring continuous BiPAP since transition to nasal cannula   reports ran out of medication approximately 3 days ago   likely in setting of acute bronchitis as noted on CT scan  At baseline on 4 L nasal cannula  Will provide ceftriaxone  Will place on IV steroids 40 3 times daily  Scheduled nebs  Wean oxygen as able    Bronchitis  Assessment & Plan  Presented with WBC of 11K, noted to have a fever 100.9 degrees in Emergency Department  Suspect likely secondary to bronchitis is noted on CT scan  Will provide ceftriaxone  Monitor WBC count & fever curve      Acute on chronic respiratory failure (HCC)  Assessment & Plan  At baseline requires 4 L nasal cannula secondary to COPD  Initially required continuous BiPAP  Has since been weaned to nasal cannula  Continue IV steroids and scheduled nebs outlined above    VT (ventricular tachycardia) ([facility])  Assessment & Plan  History of VT in the past  Continue Toprol    DEBBY (acute kidney injury) ([facility])  Assessment & Plan  baseline creatinine approximately 1.3-1.5  Present with a creatinine of 1.96  Suspect secondary to volume depletion  Will provide IV fluids  Repeat BMP in the a.m.      Elevated troponin  Assessment & Plan  Noted to be 58, 55   likely secondary to acute respiratory failure  Currently without chest pain  EKG without any signs of acute ischemia     Coronary artery disease involving native coronary artery of native heart without angina pectoris  Assessment & Plan  Status post PCI  Continue aspirin, statin, beta-blocker    Crohn disease ([facility])  Assessment & Plan  Continue  sulfasalazine           VTE Prophylaxis: Heparin  / sequential compression device   Code Status: full code  POLST: There is no POLST form on file for this patient (pre-hospital)  Discussion with family: pt    Anticipated Length of Stay:  Patient will be admitted on an Emergency basis with an anticipated length of stay of  > 2 midnights.   Justification for Hospital Stay: Acute on chronic respiratory failure with hypoxia    Total Time for Visit, including Counseling / Coordination of Care: 60 minutes.  Greater than 50% of this total time spent on direct patient counseling and coordination of care.    Chief Complaint:   Shortness of breath    History of Present Illness:    Mathew Bird is a 64 y.o. male with past medical history significant COPD, CHF, coronary artery disease, CKD who is presenting with shortness of breath and cough.  Patient reports ran out of his inhalers 3 days ago.  Denies any significant change from his chronic cough.  Denies fevers, chills, abdominal pain, nausea, vomiting or any other complaints.    Review of Systems:    Review of Systems   Constitutional:  Positive for fatigue. Negative for appetite change, chills, diaphoresis, fever and unexpected weight change.   HENT:  Negative for congestion, rhinorrhea and sore throat.    Eyes:  Negative for photophobia and visual disturbance.   Respiratory:  Positive for shortness of breath and wheezing. Negative for cough.    Cardiovascular:  Negative for chest pain, palpitations and leg swelling.   Gastrointestinal:  Negative for abdominal pain, anal bleeding, blood in stool, constipation, diarrhea, nausea and vomiting.   Genitourinary:  Negative for decreased urine volume, difficulty urinating, dysuria, flank pain, frequency, hematuria and urgency.   Musculoskeletal:  Negative for arthralgias, back pain, joint swelling and myalgias.   Skin:  Negative for color change and rash.   Neurological:  Negative for dizziness, seizures, facial asymmetry,  speech difficulty, numbness and headaches.   Psychiatric/Behavioral:  Negative for agitation, confusion and decreased concentration. The patient is not nervous/anxious.        Past Medical and Surgical History:     Past Medical History:   Diagnosis Date    Anxiety     Asthma     Cardiac disease     Cervical stenosis (uterine cervix)     Chest pain     Chronic kidney disease     Colitis     Colon polyps     COPD (chronic obstructive pulmonary disease) (HCC)     2L @ HS and PRN    Coronary artery disease     Diverticulitis     Esophageal reflux     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HCC)     Heart failure, systolic, due to idiopathic cardiomyopathy (HCC) 5/21/2018    Hyperlipidemia     Hypertension     IBD (inflammatory bowel disease)     Myocardial infarction (HCC)     Old myocardial infarction     Paroxysmal atrial fibrillation (HCC) 8/24/2018    Perforation of colon (HCC)     Type 2 diabetes, diet controlled (HCC)     Ulcerative colitis (HCC)        Past Surgical History:   Procedure Laterality Date    ANGIOPLASTY      APPENDECTOMY      COLON SURGERY      COLONOSCOPY N/A 10/24/2016    Procedure: COLONOSCOPY;  Surgeon: Tirso Juárez MD;  Location: [facility];  Service:     CORONARY ANGIOPLASTY WITH STENT PLACEMENT      ESOPHAGOGASTRODUODENOSCOPY N/A 10/24/2016    Procedure: ESOPHAGOGASTRODUODENOSCOPY (EGD);  Surgeon: Tirso Juárez MD;  Location: [facility];  Service:     EXPLORATORY LAPAROTOMY W/ BOWEL RESECTION N/A 5/22/2018    Procedure: EXPLORATORY LAPAROTOMY, ILIOCOLECTOMY, ILIOCOLONIC ANASTAMOSIS, LOOP ILIOSTOMY, REPAIR OF SEROSAL TEAR, EXTENSIVE LYSIS OF ADHESIONS, WOUND VAC PLACEMENT;  Surgeon: Tirso Juárez MD;  Location: [facility];  Service: Colorectal    HEMICOLECTOMY      ILEOSTOMY CLOSURE N/A 10/5/2018    Procedure: CLOSURE ILEOSTOMY;  Surgeon: Tirso Juárez MD;  Location: [facility];  Service: Colorectal    OTHER SURGICAL HISTORY      stent indications acute myocardial infarction    [place]  COLONOSCOPY FLX DX W/COLLJ SPEC WHEN PFRMD N/A 2/6/2018    Procedure: COLONOSCOPY;  Surgeon: Tirso Juárez MD;  Location: [facility];  Service: Colorectal    FL COLONOSCOPY FLX DX W/COLLJ SPEC WHEN PFRMD N/A 10/4/2018    Procedure: COLONOSCOPY;  Surgeon: Tirso Juárez MD;  Location: [facility];  Service: Colorectal    TONSILLECTOMY         Meds/Allergies:    Prior to Admission medications    Medication Sig Start Date End Date Taking? Authorizing Provider   albuterol (2.5 mg/3 mL) 0.083 % nebulizer solution Take 3 mL (2.5 mg total) by nebulization every 4 (four) hours as needed for wheezing or shortness of breath 6/3/24   Rony Limon MD   albuterol (PROVENTIL HFA,VENTOLIN HFA) 90 mcg/act inhaler TAKE 2 PUFFS BY MOUTH EVERY 4 HOURS AS NEEDED FOR WHEEZE 8/14/24   Rony Limon MD   aspirin ([employer] Aspirin Adult Low Dose) 81 mg chewable tablet CHEW 1 TABLET BY MOUTH DAILY 4/2/24   AMALIA Wilkerson   atorvastatin (LIPITOR) 40 mg tablet TAKE 1 TABLET BY MOUTH DAILY WITH DINNER 8/18/24   Holland Spears MD   Budeson-Glycopyrrol-Formoterol (Breztri Aerosphere) 160-9-4.8 MCG/ACT AERO Inhale 2 puffs 2 (two) times a day Rinse mouth after use 3/1/24   Rony Limon MD   metoprolol succinate (TOPROL-XL) 25 mg 24 hr tablet Take 1 tablet (25 mg total) by mouth daily at bedtime Daily at bedtime 8/24/23   AMALIA Wilkerson   Multiple Vitamins-Minerals (Centrum Silver 50+Men) TABS Take by mouth    Historical Provider, MD   Nebulizers (AERONEB GO NEBULIZER HANDSET) MISC by Does not apply route 2 (two) times a day as needed (wheezing) 6/8/18   Luciano Echevarria MD   nicotine (NICODERM CQ) 21 mg/24 hr TD 24 hr patch Place 1 patch on the skin over 24 hours daily 2/10/24   Felisa Nguyen MD   pantoprazole (PROTONIX) 40 mg tablet TAKE 1 TABLET BY MOUTH EVERY DAY 4/2/24   AMALIA Carnes   sulfaSALAzine (AZULFIDINE) 500 mg tablet Take 1 tablet (500 mg total) by mouth 4 (four) times a day 5/6/23  2/28/24  France Cole MD     I have reviewed home medications with patient personally.    Allergies:   Allergies   Allergen Reactions    Medical Tape     Other      Brown cloth band aids       Latex Rash       Social History:     Marital Status:      Patient Pre-hospital Living Situation: home  Patient Pre-hospital Level of Mobility: independent  Patient Pre-hospital Diet Restrictions: non  Substance Use History:   Social History     Substance and Sexual Activity   Alcohol Use Not Currently    Comment: last drink may 2023     Social History     Tobacco Use   Smoking Status Every Day    Types: Cigars   Smokeless Tobacco Never   Tobacco Comments    4-5cigars per day before nowadays smokes about 1 cigar, trying to cut down.     Social History     Substance and Sexual Activity   Drug Use No       Family History:    Family History   Problem Relation Age of Onset    Coronary artery disease Father     Crohn's disease Father     Stroke Father     Diabetes Family        Physical Exam:     Vitals:   Blood Pressure: 109/69 (09/02/24 1400)  Pulse: 104 (09/02/24 1400)  Temperature: (!) 100.9 °F (38.3 °C) (09/02/24 0939)  Temp Source: Tympanic (09/02/24 0939)  Respirations: 22 (09/02/24 1400)  SpO2: 97 % (09/02/24 1400)    Physical Exam  Constitutional:       General: He is not in acute distress.     Appearance: He is well-developed. He is not diaphoretic.   HENT:      Head: Normocephalic and atraumatic.      Nose: Nose normal.      Mouth/Throat:      Pharynx: No oropharyngeal exudate.   Eyes:      General: No scleral icterus.     Conjunctiva/sclera: Conjunctivae normal.   Cardiovascular:      Rate and Rhythm: Normal rate and regular rhythm.      Heart sounds: Normal heart sounds. No murmur heard.     No friction rub. No gallop.   Pulmonary:      Effort: Pulmonary effort is normal. No respiratory distress.      Breath sounds: Wheezing present. No rales.   Chest:      Chest wall: No tenderness.    Abdominal:      General: Bowel sounds are normal. There is no distension.      Palpations: Abdomen is soft.      Tenderness: There is no abdominal tenderness. There is no guarding.   Musculoskeletal:         General: No tenderness, deformity or edema. Normal range of motion.      Cervical back: Normal range of motion and neck supple.   Skin:     General: Skin is warm and dry.      Findings: No erythema.   Neurological:      Mental Status: He is alert. Mental status is at baseline.   Psychiatric:         Mood and Affect: Mood and affect normal.             Additional Data:     Lab Results: I have personally reviewed pertinent reports.      Results from last 7 days   Lab Units 09/02/24  1005   WBC Thousand/uL 11.48*   HEMOGLOBIN g/dL 13.7   HEMATOCRIT % 44.8   PLATELETS Thousands/uL 163   SEGS PCT % 85*   LYMPHO PCT % 7*   MONO PCT % 7   EOS PCT % 0     Results from last 7 days   Lab Units 09/02/24  1031   SODIUM mmol/L 141   POTASSIUM mmol/L 4.5   CHLORIDE mmol/L 102   CO2 mmol/L 26   BUN mg/dL 19   CREATININE mg/dL 1.96*   ANION GAP mmol/L 13   CALCIUM mg/dL 8.5   ALBUMIN g/dL 3.8   TOTAL BILIRUBIN mg/dL 0.57   ALK PHOS U/L 95   ALT U/L 27   AST U/L 25   GLUCOSE RANDOM mg/dL 178*     Results from last 7 days   Lab Units 09/02/24  1005   INR  1.01             Results from last 7 days   Lab Units 09/02/24  1031   LACTIC ACID mmol/L 1.0   PROCALCITONIN ng/ml 1.12*       Imaging: I have personally reviewed pertinent reports.      CTA ED chest PE study   Final Result by Yaima Almazan MD (09/02 7740)      1. No pulmonary embolism.      2. Bilateral predominantly lower lobe mild bronchial wall thickening with foci of mucous plugging may be related to bronchitis. Mild left basilar atelectasis.      3. Scattered small groundglass opacities may be inflammatory.   Based on current Fleischner Society 2017 Guidelines on incidental pulmonary nodule, followup noncontrast CT is recommended at 6-12 months from the initial  examination to exclude persistence. A thin-walled air-filled structure in the left lower lobe    measuring 1.3 cm can be reassessed on follow-up.      3. Moderate emphysema.         The study was marked in EPIC for immediate notification.                     Workstation performed: [id number]         XR chest 1 view portable   ED Interpretation by Galindo Chung DO (09/02 1052)   Hyperinflated lungs, no sign of focal consolidation suggestive of pneumonia          EKG, Pathology, and Other Studies Reviewed on Admission:   EKG: reviewed    Allscripts / Epic Records Reviewed: Yes     ** Please Note: This note has been constructed using a voice recognition system. **

## 2024-09-02 NOTE — ED NOTES
Continuing magnesium infusion initiated by EMS per provider.      Lanny Castillo RN  09/02/24 0990    
Patient transported to CT     Lizbeth Dodge RN  09/02/24 4071    
Pt weaned off bipap on 5L via NC     Lizbeth oDdge RN  09/02/24 9720    
Pt will demonstrate improved static/dynamic balance to good in order to increase safety and independence in ADLs within 4 weeks

## 2024-09-02 NOTE — ASSESSMENT & PLAN NOTE
Presented with WBC of 11K, noted to have a fever 100.9 degrees in Emergency Department  Suspect likely secondary to bronchitis is noted on CT scan  Will provide ceftriaxone  Monitor WBC count & fever curve

## 2024-09-02 NOTE — ASSESSMENT & PLAN NOTE
At baseline requires 4 L nasal cannula secondary to COPD  Initially required continuous BiPAP  Has since been weaned to nasal cannula  Continue IV steroids and scheduled nebs outlined above

## 2024-09-02 NOTE — ASSESSMENT & PLAN NOTE
baseline creatinine approximately 1.3-1.5  Present with a creatinine of 1.96  Suspect secondary to volume depletion  Will provide IV fluids  Repeat BMP in the a.m.

## 2024-09-02 NOTE — ASSESSMENT & PLAN NOTE
Noted to be 58, 55   likely secondary to acute respiratory failure  Currently without chest pain  EKG without any signs of acute ischemia

## 2024-09-02 NOTE — ED PROVIDER NOTES
History  Chief Complaint   Patient presents with    Shortness of Breath     Pt c/o sob x a few days, hx copd, per ems pt ran out of home asthma meds, ems gave duo neb albuterol and started mag. Ems placed pt on cpap. Switched to bipap on arrival      Patient is a 64-year-old male with past medical history of COPD, CAD, CHF, CKD, IBD presenting to the emergency department via EMS for concern of respiratory distress.  Patient endorses worsening shortness of breath and cough for the past 3 days.  Patient has a history of COPD on albuterol, states he ran out of his medication approximately 3 days ago.  Patient is a poor historian, but denies fevers, chills, abdominal pain, nausea.  He does endorse 1 episode of emesis yesterday.  He denies chest pain, leg swelling.  He does endorse cough with some sputum production.    Patient arrived via EMS on CPAP, originally satting at 91% but has now improved.  EMS administered DuoNeb, Solu-Medrol, magnesium on route to the emergency department.  Upon arrival, patient was demonstrating increased work of breathing, respiratory distress with tripoding and was subsequently placed on BiPAP with some improvement in his symptoms.          Shortness of Breath      Prior to Admission Medications   Prescriptions Last Dose Informant Patient Reported? Taking?   Budeson-Glycopyrrol-Formoterol (Breztri Aerosphere) 160-9-4.8 MCG/ACT AERO  Self No No   Sig: Inhale 2 puffs 2 (two) times a day Rinse mouth after use   Multiple Vitamins-Minerals (Centrum Silver 50+Men) TABS  Self Yes No   Sig: Take by mouth   Nebulizers (AERONEB GO NEBULIZER HANDSET) MISC  Self No No   Sig: by Does not apply route 2 (two) times a day as needed (wheezing)   albuterol (2.5 mg/3 mL) 0.083 % nebulizer solution   No No   Sig: Take 3 mL (2.5 mg total) by nebulization every 4 (four) hours as needed for wheezing or shortness of breath   albuterol (PROVENTIL HFA,VENTOLIN HFA) 90 mcg/act inhaler   No No   Sig: TAKE 2 PUFFS BY  MOUTH EVERY 4 HOURS AS NEEDED FOR WHEEZE   aspirin ([employer] Aspirin Adult Low Dose) 81 mg chewable tablet  Self No No   Sig: CHEW 1 TABLET BY MOUTH DAILY   atorvastatin (LIPITOR) 40 mg tablet   No No   Sig: TAKE 1 TABLET BY MOUTH DAILY WITH DINNER   metoprolol succinate (TOPROL-XL) 25 mg 24 hr tablet  Self No No   Sig: Take 1 tablet (25 mg total) by mouth daily at bedtime Daily at bedtime   nicotine (NICODERM CQ) 21 mg/24 hr TD 24 hr patch  Self No No   Sig: Place 1 patch on the skin over 24 hours daily   pantoprazole (PROTONIX) 40 mg tablet  Self No No   Sig: TAKE 1 TABLET BY MOUTH EVERY DAY   sulfaSALAzine (AZULFIDINE) 500 mg tablet  Self No No   Sig: Take 1 tablet (500 mg total) by mouth 4 (four) times a day      Facility-Administered Medications: None       Past Medical History:   Diagnosis Date    Anxiety     Asthma     Cardiac disease     Cervical stenosis (uterine cervix)     Chest pain     Chronic kidney disease     Colitis     Colon polyps     COPD (chronic obstructive pulmonary disease) (HCC)     2L @ HS and PRN    Coronary artery disease     Diverticulitis     Esophageal reflux     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HCC)     Heart failure, systolic, due to idiopathic cardiomyopathy (HCC) 5/21/2018    Hyperlipidemia     Hypertension     IBD (inflammatory bowel disease)     Myocardial infarction (HCC)     Old myocardial infarction     Paroxysmal atrial fibrillation (HCC) 8/24/2018    Perforation of colon (HCC)     Type 2 diabetes, diet controlled (HCC)     Ulcerative colitis (HCC)        Past Surgical History:   Procedure Laterality Date    ANGIOPLASTY      APPENDECTOMY      COLON SURGERY      COLONOSCOPY N/A 10/24/2016    Procedure: COLONOSCOPY;  Surgeon: Tirso Juárez MD;  Location: [facility];  Service:     CORONARY ANGIOPLASTY WITH STENT PLACEMENT      ESOPHAGOGASTRODUODENOSCOPY N/A 10/24/2016    Procedure: ESOPHAGOGASTRODUODENOSCOPY (EGD);  Surgeon: Tirso Juárez MD;  Location: [facility];  Service:      EXPLORATORY LAPAROTOMY W/ BOWEL RESECTION N/A 5/22/2018    Procedure: EXPLORATORY LAPAROTOMY, ILIOCOLECTOMY, ILIOCOLONIC ANASTAMOSIS, LOOP ILIOSTOMY, REPAIR OF SEROSAL TEAR, EXTENSIVE LYSIS OF ADHESIONS, WOUND VAC PLACEMENT;  Surgeon: Tirso Juárez MD;  Location: [facility];  Service: Colorectal    HEMICOLECTOMY      ILEOSTOMY CLOSURE N/A 10/5/2018    Procedure: CLOSURE ILEOSTOMY;  Surgeon: Tirso Juárez MD;  Location: [facility];  Service: Colorectal    OTHER SURGICAL HISTORY      stent indications acute myocardial infarction    ID COLONOSCOPY FLX DX W/COLLJ SPEC WHEN PFRMD N/A 2/6/2018    Procedure: COLONOSCOPY;  Surgeon: Tirso Juárez MD;  Location: [facility];  Service: Colorectal    ID COLONOSCOPY FLX DX W/COLLJ SPEC WHEN PFRMD N/A 10/4/2018    Procedure: COLONOSCOPY;  Surgeon: Tirso Juárez MD;  Location: [facility];  Service: Colorectal    TONSILLECTOMY         Family History   Problem Relation Age of Onset    Coronary artery disease Father     Crohn's disease Father     Stroke Father     Diabetes Family      I have reviewed and agree with the history as documented.    E-Cigarette/Vaping    E-Cigarette Use Never User      E-Cigarette/Vaping Substances    Nicotine No     THC No     CBD No     Flavoring No     Other No     Unknown No      Social History     Tobacco Use    Smoking status: Every Day     Types: Cigars    Smokeless tobacco: Never    Tobacco comments:     4-5cigars per day before nowadays smokes about 1 cigar, trying to cut down.   Vaping Use    Vaping status: Never Used   Substance Use Topics    Alcohol use: Not Currently     Comment: last drink may 2023    Drug use: No        Review of Systems   Respiratory:  Positive for shortness of breath.        Physical Exam  ED Triage Vitals [09/02/24 0939]   Temperature Pulse Respirations Blood Pressure SpO2   (!) 100.9 °F (38.3 °C) 64 (!) 28 109/55 96 %      Temp Source Heart Rate Source Patient Position - Orthostatic VS BP Location FiO2  (%)   Tympanic Monitor -- -- --      Pain Score       --             Orthostatic Vital Signs  Vitals:    09/02/24 1335 09/02/24 1400 09/02/24 1500 09/02/24 1600   BP:  109/69 117/73 107/77   Pulse: 100 104 98 96       Physical Exam  Vitals reviewed.   Constitutional:       General: He is in acute distress.   HENT:      Head: Normocephalic and atraumatic.   Neck:      Vascular: No JVD.      Trachea: No tracheal deviation.   Cardiovascular:      Rate and Rhythm: Regular rhythm. Tachycardia present.      Pulses: No decreased pulses.      Heart sounds: No murmur heard.  Pulmonary:      Effort: Tachypnea and respiratory distress present.      Breath sounds: Decreased breath sounds and wheezing (Bilateral wheezes but more porminent in the left lung fields) present. No rhonchi or rales.   Abdominal:      General: Bowel sounds are normal.      Palpations: Abdomen is soft.      Tenderness: There is no abdominal tenderness. There is no guarding or rebound.      Comments: Ventral hernia present, soft   Musculoskeletal:      Right lower leg: No tenderness. No edema.      Left lower leg: No tenderness. No edema.   Skin:     General: Skin is warm and dry.      Capillary Refill: Capillary refill takes 2 to 3 seconds.      Coloration: Skin is cyanotic.   Neurological:      General: No focal deficit present.      Mental Status: He is alert and oriented to person, place, and time.         ED Medications  Medications   nicotine (NICODERM CQ) 14 mg/24hr TD 24 hr patch 1 patch (1 patch Transdermal Medication Applied 9/2/24 1552)   benzonatate (TESSALON PERLES) capsule 100 mg (has no administration in time range)   heparin (porcine) subcutaneous injection 5,000 Units (has no administration in time range)   ceftriaxone (ROCEPHIN) 1 g/50 mL in dextrose IVPB (1,000 mg Intravenous New Bag 9/2/24 1552)   acetaminophen (TYLENOL) tablet 650 mg (has no administration in time range)   methylPREDNISolone sodium succinate (Solu-MEDROL) injection 40  mg (40 mg Intravenous Given 9/2/24 1552)   albuterol (PROVENTIL HFA,VENTOLIN HFA) inhaler 2 puff (has no administration in time range)   atorvastatin (LIPITOR) tablet 40 mg (40 mg Oral Not Given 9/2/24 1851)   aspirin chewable tablet 81 mg (has no administration in time range)   budesonide-formoterol (SYMBICORT) 160-4.5 mcg/act inhaler 2 puff (2 puffs Inhalation Given 9/2/24 2000)   metoprolol succinate (TOPROL-XL) 24 hr tablet 25 mg (has no administration in time range)   pantoprazole (PROTONIX) EC tablet 40 mg (has no administration in time range)   sulfaSALAzine (AZULFIDINE) tablet 500 mg (500 mg Oral Given 9/2/24 2001)   sodium chloride 0.9 % infusion (100 mL/hr Intravenous New Bag 9/2/24 1558)   ipratropium (ATROVENT) 0.02 % inhalation solution 0.5 mg (0.5 mg Nebulization Given 9/2/24 2007)   levalbuterol (XOPENEX) inhalation solution 1.25 mg (1.25 mg Nebulization Given 9/2/24 2007)   albuterol (FOR EMS ONLY) (2.5 mg/3 mL) 0.083 % inhalation solution 2.5 mg (0 mg Does not apply Given to EMS 9/2/24 0946)   magnesium sulfate (FOR EMS ONLY) 2 g/50 mL IVPB (premix) 2 g (0 g Does not apply Given to EMS 9/2/24 0946)   ipratropium-albuterol (FOR EMS ONLY) (DUO-NEB) 0.5-2.5 mg/3 mL inhalation solution 3 mL (0 mL Does not apply Given to EMS 9/2/24 0946)   methylPREDNISolone sodium succinate (FOR EMS ONLY) (Solu-MEDROL) 125 MG injection 125 mg (0 mg Does not apply Given to EMS 9/2/24 0946)   ipratropium-albuterol (DUO-NEB) 0.5-2.5 mg/3 mL inhalation solution 3 mL (3 mL Nebulization Given 9/2/24 0951)   acetaminophen (Ofirmev) injection 1,000 mg (0 mg Intravenous Stopped 9/2/24 1026)   magnesium sulfate 2 g/50 mL IVPB (premix) 2 g (0 g Intravenous Stopped 9/2/24 1242)   azithromycin (ZITHROMAX) 500 mg in sodium chloride 0.9% 250mL IVPB 500 mg (0 mg Intravenous Stopped 9/2/24 1322)   sodium chloride 0.9 % bolus 500 mL (0 mL Intravenous Stopped 9/2/24 1241)   albuterol inhalation solution 10 mg (10 mg Nebulization Given  9/2/24 1208)   ipratropium (ATROVENT) 0.02 % inhalation solution 1 mg (1 mg Nebulization Given 9/2/24 1209)   sodium chloride 0.9 % inhalation solution 12 mL (12 mL Nebulization Given 9/2/24 1209)   iohexol (OMNIPAQUE) 350 MG/ML injection (MULTI-DOSE) 85 mL (85 mL Intravenous Given 9/2/24 1353)       Diagnostic Studies  Results Reviewed       Procedure Component Value Units Date/Time    Blood culture #1 [[id number]] Collected: 09/02/24 1031    Lab Status: Preliminary result Specimen: Blood from Arm, Right Updated: 09/02/24 1801     Blood Culture Received in Microbiology Lab. Culture in Progress.    Blood culture [[id number]] Collected: 09/02/24 1128    Lab Status: Preliminary result Specimen: Blood from Arm, Right Updated: 09/02/24 1801     Blood Culture Received in Microbiology Lab. Culture in Progress.    HS Troponin I 4hr [[id number]]  (Normal) Collected: 09/02/24 1518    Lab Status: Final result Specimen: Blood from Arm, Right Updated: 09/02/24 1552     hs TnI 4hr 41 ng/L      Delta 4hr hsTnI -17 ng/L     HS Troponin I 2hr [[id number]]  (Abnormal) Collected: 09/02/24 1256    Lab Status: Final result Specimen: Blood from Arm, Left Updated: 09/02/24 1332     hs TnI 2hr 55 ng/L      Delta 2hr hsTnI -3 ng/L     Lactic acid [[id number]]  (Normal) Collected: 09/02/24 1031    Lab Status: Final result Specimen: Blood from Arm, Right Updated: 09/02/24 1206     LACTIC ACID 1.0 mmol/L     Narrative:      Result may be elevated if tourniquet was used during collection.    Comprehensive metabolic panel [[id number]]  (Abnormal) Collected: 09/02/24 1031    Lab Status: Final result Specimen: Blood from Arm, Right Updated: 09/02/24 1205     Sodium 141 mmol/L      Potassium 4.5 mmol/L      Chloride 102 mmol/L      CO2 26 mmol/L      ANION GAP 13 mmol/L      BUN 19 mg/dL      Creatinine 1.96 mg/dL      Glucose 178 mg/dL      Calcium 8.5 mg/dL      AST 25 U/L      ALT 27 U/L      Alkaline Phosphatase 95 U/L      Total Protein 6.7 g/dL       Albumin 3.8 g/dL      Total Bilirubin 0.57 mg/dL      eGFR 35 ml/min/1.73sq m     Narrative:      National Kid[employer] guidelines for Chronic Kidney Disease (CKD):     Stage 1 with normal or high GFR (GFR > 90 mL/min/1.73 square meters)    Stage 2 Mild CKD (GFR = 60-89 mL/min/1.73 square meters)    Stage 3A Moderate CKD (GFR = 45-59 mL/min/1.73 square meters)    Stage 3B Moderate CKD (GFR = 30-44 mL/min/1.73 square meters)    Stage 4 Severe CKD (GFR = 15-29 mL/min/1.73 square meters)    Stage 5 End Stage CKD (GFR <15 mL/min/1.73 square meters)  Note: GFR calculation is accurate only with a steady state creatinine    Procalcitonin [[id number]]  (Abnormal) Collected: 09/02/24 1031    Lab Status: Final result Specimen: Blood from Arm, Right Updated: 09/02/24 1205     Procalcitonin 1.12 ng/ml     HS Troponin 0hr (reflex protocol) [[id number]]  (Abnormal) Collected: 09/02/24 1056    Lab Status: Final result Specimen: Blood from Arm, Left Updated: 09/02/24 1135     hs TnI 0hr 58 ng/L     B-Type Natriuretic Peptide(BNP) [[id number]]  (Abnormal) Collected: 09/02/24 1005    Lab Status: Final result Specimen: Blood from Arm, Right Updated: 09/02/24 1126      pg/mL     Protime-INR [[id number]]  (Normal) Collected: 09/02/24 1005    Lab Status: Final result Specimen: Blood from Arm, Right Updated: 09/02/24 1048     Protime 13.6 seconds      INR 1.01    Narrative:      INR Therapeutic Range    Indication                                             INR Range      Atrial Fibrillation                                               2.0-3.0  Hypercoagulable State                                    2.0.2.3  Left Ventricular Asist Device                            2.0-3.0  Mechanical Heart Valve                                  -    Aortic(with afib, MI, embolism, HF, LA enlargement,    and/or coagulopathy)                                     2.0-3.0 (2.5-3.5)     Mitral                                                              2.5-3.5  Prosthetic/Bioprosthetic Heart Valve               2.0-3.0  Venous thromboembolism (VTE: VT, PE        2.0-3.0    APTT [[id number]]  (Normal) Collected: 09/02/24 1005    Lab Status: Final result Specimen: Blood from Arm, Right Updated: 09/02/24 1048     PTT 30 seconds     D-dimer, quantitative [[id number]]  (Abnormal) Collected: 09/02/24 1005    Lab Status: Final result Specimen: Blood from Arm, Right Updated: 09/02/24 1039     D-Dimer, Quant 4.14 ug/ml FEU     Narrative:      In the evaluation for possible pulmonary embolism, in the appropriate (Well's Score of 4 or less) patient, the age adjusted d-dimer cutoff for this patient can be calculated as:    Age x 0.01 (in ug/mL) for Age-adjusted D-dimer exclusion threshold for a patient over 50 years.    UA w Reflex to Microscopic w Reflex to Culture [[id number]]     Lab Status: No result Specimen: Urine     CBC and differential [[id number]]  (Abnormal) Collected: 09/02/24 1005    Lab Status: Final result Specimen: Blood from Arm, Right Updated: 09/02/24 1020     WBC 11.48 Thousand/uL      RBC 5.59 Million/uL      Hemoglobin 13.7 g/dL      Hematocrit 44.8 %      MCV 80 fL      MCH 24.5 pg      MCHC 30.6 g/dL      RDW 21.1 %      MPV 10.8 fL      Platelets 163 Thousands/uL      nRBC 0 /100 WBCs      Segmented % 85 %      Immature Grans % 1 %      Lymphocytes % 7 %      Monocytes % 7 %      Eosinophils Relative 0 %      Basophils Relative 0 %      Absolute Neutrophils 9.70 Thousands/µL      Absolute Immature Grans 0.07 Thousand/uL      Absolute Lymphocytes 0.83 Thousands/µL      Absolute Monocytes 0.84 Thousand/µL      Eosinophils Absolute 0.00 Thousand/µL      Basophils Absolute 0.04 Thousands/µL                    CTA ED chest PE study   Final Result by Yaima Almazan MD (09/02 1450)      1. No pulmonary embolism.      2. Bilateral predominantly lower lobe mild bronchial wall thickening with foci of mucous plugging may be related to bronchitis. Mild  left basilar atelectasis.      3. Scattered small groundglass opacities may be inflammatory.   Based on current Fleischner Society 2017 Guidelines on incidental pulmonary nodule, followup noncontrast CT is recommended at 6-12 months from the initial examination to exclude persistence. A thin-walled air-filled structure in the left lower lobe    measuring 1.3 cm can be reassessed on follow-up.      3. Moderate emphysema.         The study was marked in EPIC for immediate notification.                     Workstation performed: [id number]         XR chest 1 view portable   ED Interpretation by Galindo Chung DO (09/02 1052)   Hyperinflated lungs, no sign of focal consolidation suggestive of pneumonia            Procedures  POC Cardiac US    Date/Time: 9/2/2024 10:47 AM    Performed by: Galindo Chung DO  Authorized by: Galindo Chung DO    Patient location:  ED  Procedure details:     Exam Type:  Diagnostic    Indications: suspected volume depletion, dyspnea and respiratory distress      Assessment / Evaluation for: cardiac function, pericardial effusion, inferior vena cava for fluid responsiveness, intravascular volume status and right heart strain (suspected pulmonary embolism)      Exam Type: initial exam      Image quality: diagnostic      Image availability:  Images available in PACS  Patient Details:     Cardiac Rhythm:  Regular    Mechanical ventilation: Yes    Cardiac findings:     Echo technique: limited 2D      Views obtained: parasternal long axis, parasternal short axis and subcostal      Pericardial effusion: absent      Tamponade physiology: absent      Wall motion: normal      LV systolic function: depressed      RV dilation: none    Pulmonary findings:     Left Lung Findings: left lung sliding      Right lung findings: right lung sliding      B-lines: no B-lines present    IVC findings:     IVC Size: small      IVC Inspiratory Collapse: normal    Interpretation:     Fluid Status:  Hypovolemic        ED  Course  ED Course as of 09/02/24 2013   Mon Sep 02, 2024   1030 WBC(!): 11.48   1030 Temperature(!): 100.9 °F (38.3 °C)   1030 Respirations(!): 30   1057 ECG 12 lead  Procedure Note: EKG  Date/Time: 09/02/24 10:57 AM   Interpreted by:Galindo Chung  Indications / Diagnosis: dyspnea  ECG reviewed by me, the ED Provider: yes   The EKG demonstrates:  Rate: 120-130  Rhythm: sinus tachycardia with occasional PVCs  Intervals: normal  Axis: normal  QRS/Blocks: normal  ST Changes: No acute ST Changes, no STD/KAREN.  Compared to prior EKG on 02/03.2024, unchanged.     1124 Patient reevaluated, states he is feeling better. Patient's respiratory rate improved, but still tachypnea with some wheezes on auscultation.   1135 hs TnI 0hr(!): 58   1216 Procalcitonin(!): 1.12   1226 Patient symptoms continue to improve, planning to trial off BiPAP and then perform CT scan.   1228 Creatinine(!): 1.96  Mild DEBBY, treated with IV fluids   1311 Patient's symptoms have improved, planning to trial off BiPAP and on NC   1337 Patient currently on 5L NC, breathing more comfortably and maintaining oxygenation.   1502 CTA ED chest PE study  1. No pulmonary embolism.     2. Bilateral predominantly lower lobe mild bronchial wall thickening with foci of mucous plugging may be related to bronchitis. Mild left basilar atelectasis.     3. Scattered small groundglass opacities may be inflammatory.                                   Wells' Criteria for PE      Flowsheet Row Most Recent Value   Wells' Criteria for PE    Clinical signs and symptoms of DVT 0 Filed at: 09/02/2024 1100   PE is primary diagnosis or equally likely 0 Filed at: 09/02/2024 1100   HR >100 1.5 Filed at: 09/02/2024 1100   Immobilization at least 3 days or Surgery in the previous 4 weeks 0 Filed at: 09/02/2024 1100   Previous, objectively diagnosed PE or DVT 0 Filed at: 09/02/2024 1100   Hemoptysis 0 Filed at: 09/02/2024 1100   Malignancy with treatment within 6 months or palliative 0  Filed at: 09/02/2024 1100   Wells' Criteria Total 1.5 Filed at: 09/02/2024 1100              Medical Decision Making  ASSESSMENT: Patient is a 64 y.o. male who presents with respiratory distress requiring BiPAP.   DDX includes but not limited to: COPD exacerbation, Pneumonia, Acute CHF exacerbation, Pulmonary Embolism.   PLAN: CBC, CMP, UA, Coag studies, Procalcitonin, Tn, Blood cultures, D-Dimer, CXR.    POCUS Cardiac did not demonstrate any RV dilation indicative of R heart strain, no signs of pericardial effusion, no presence of b-lines indicative of pulmonary edema, IVC small and collapsible suggestive of mild hypovolemia.    Treated with Tylenol, IV Fluids, DuoNeb, Mg Sulfate, Azithromycin. Patient received solumedrol per EMS.  Patient continued to demonstrate wheezes following second DuoNeb, administered JIMENEZ Neb.  Patient demonstrated improvement in symptoms and WOB following JIMENEZ neb, patient tolerated trial off BiPAP and maintained PulseOx and improved symptoms on 5L NC.    CBC - mild leukocytosis  CMP - mild DEBBY, treating with IV Fluids  Tn - elevated, ECG and hx less concerning for ACS  Procalcitonin - mildly elevated, indicative of inflammatory process      Elevated D-Dimer, acquired CTA PE study for concern for pulmonary embolism.    CTA Final Read:    1. No pulmonary embolism.    2. Bilateral predominantly lower lobe mild bronchial wall thickening with foci of mucous plugging may be related to bronchitis. Mild left basilar atelectasis.    3. Scattered small groundglass opacities may be inflammatory.  Based on current Fleischner Society 2017 Guidelines on incidental pulmonary nodule, followup noncontrast CT is recommended at 6-12 months from the initial examination to exclude persistence. A thin-walled air-filled structure in the left lower lobe   measuring 1.3 cm can be reassessed on follow-up.    Patient provided counseling on his incidental pulmonary nodule and counseled on outpatient follow up for  continued monitoring of this incidental finding. Patient appears to understand his results and is agreeable to the plan.    Patient's POCUS Cardiac and CXR with no signs of pulmonary edema and physical exam findings is less suggestive of acute CHF exacerbation. CTA does not demonstrate any acute pathology suggestive of pulmonary embolism or pneumonia.     Patient stable for admission to [facility] for acute on chronic respiratory failure, acute on chronic COPD exacerbation. Patient understanding and agreeable to plan. The remainder of the patient's ED case was signed out to ED provider.    Amount and/or Complexity of Data Reviewed  Labs: ordered. Decision-making details documented in ED Course.  Radiology: ordered and independent interpretation performed. Decision-making details documented in ED Course.  ECG/medicine tests:  Decision-making details documented in ED Course.    Risk  Prescription drug management.  Decision regarding hospitalization.          Disposition  Final diagnoses:   COPD with acute exacerbation (HCC)   Acute on chronic respiratory failure (HCC)   CHF (congestive heart failure) (HCC)     Time reflects when diagnosis was documented in both MDM as applicable and the Disposition within this note       Time User Action Codes Description Comment    9/2/2024  3:06 PM Galindo Chnug Add [J44.1] COPD with acute exacerbation (HCC)     9/2/2024  3:06 PM Galindo Chung Add [J96.20] Acute on chronic respiratory failure (HCC)     9/2/2024  3:07 PM Galindo Chung Add [I50.9] CHF (congestive heart failure) (HCC)           ED Disposition       ED Disposition   Admit    Condition   Stable    Date/Time   Mon Sep 2, 2024 1246    Comment   Case was discussed with [facility] and the patient's admission status was agreed to be Admission Status: inpatient status to the service of Dr. Echevarria .               Follow-up Information    None         Current Discharge Medication List        CONTINUE these medications which have NOT  CHANGED    Details   albuterol (2.5 mg/3 mL) 0.083 % nebulizer solution Take 3 mL (2.5 mg total) by nebulization every 4 (four) hours as needed for wheezing or shortness of breath  Qty: 375 mL, Refills: 5    Comments: DX Code Needed  .  Associated Diagnoses: Stage 4 very severe COPD by GOLD classification ([facility])      albuterol (PROVENTIL HFA,VENTOLIN HFA) 90 mcg/act inhaler TAKE 2 PUFFS BY MOUTH EVERY 4 HOURS AS NEEDED FOR WHEEZE  Qty: 18 g, Refills: 5    Comments: Substitution to a formulary equivalent within the same pharmaceutical class is authorized.  Associated Diagnoses: Stage 4 very severe COPD by GOLD classification ([facility])      aspirin ([employer]S Aspirin Adult Low Dose) 81 mg chewable tablet CHEW 1 TABLET BY MOUTH DAILY  Qty: 90 tablet, Refills: 1    Associated Diagnoses: Coronary artery disease involving native coronary artery of native heart without angina pectoris      atorvastatin (LIPITOR) 40 mg tablet TAKE 1 TABLET BY MOUTH DAILY WITH DINNER  Qty: 30 tablet, Refills: 0    Associated Diagnoses: Coronary artery disease involving native coronary artery of native heart without angina pectoris      Budeson-Glycopyrrol-Formoterol (Breztri Aerosphere) 160-9-4.8 MCG/ACT AERO Inhale 2 puffs 2 (two) times a day Rinse mouth after use  Qty: 32.1 g, Refills: 3    Comments: DX Code Needed  .  Associated Diagnoses: Stage 4 very severe COPD by GOLD classification ([facility])      metoprolol succinate (TOPROL-XL) 25 mg 24 hr tablet Take 1 tablet (25 mg total) by mouth daily at bedtime Daily at bedtime  Qty: 90 tablet, Refills: 4    Associated Diagnoses: Tachycardia; Coronary artery disease involving native coronary artery of native heart without angina pectoris      Multiple Vitamins-Minerals (Centrum Silver 50+Men) TABS Take by mouth      Nebulizers (AERONEB GO NEBULIZER HANDSET) MISC by Does not apply route 2 (two) times a day as needed (wheezing)  Qty: 1 each, Refills: 0    Associated Diagnoses: Uncomplicated asthma, unspecified  asthma severity, unspecified whether persistent      nicotine (NICODERM CQ) 21 mg/24 hr TD 24 hr patch Place 1 patch on the skin over 24 hours daily  Qty: 28 patch, Refills: 0    Associated Diagnoses: Cigarette nicotine dependence without complication      pantoprazole (PROTONIX) 40 mg tablet TAKE 1 TABLET BY MOUTH EVERY DAY  Qty: 90 tablet, Refills: 1    Associated Diagnoses: GI bleeding      sulfaSALAzine (AZULFIDINE) 500 mg tablet Take 1 tablet (500 mg total) by mouth 4 (four) times a day  Qty: 360 tablet, Refills: 0    Associated Diagnoses: Crohn's disease with other complication, unspecified gastrointestinal tract location (HCC)           No discharge procedures on file.    PDMP Review       None             ED Provider  Attending physically available and evaluated Mathew Bird. I managed the patient along with the ED Attending.    Electronically Signed by           Galindo Chung DO  09/03/24 3737

## 2024-09-02 NOTE — RESPIRATORY THERAPY NOTE
RT Protocol Note  Mathew Bird 64 y.o. male MRN: [id number]  Unit/Bed#: ED 20 Encounter: [id number]    Assessment    Active Problems:  There are no active Hospital Problems.      Home Pulmonary Medications:  albuterol       Past Medical History:   Diagnosis Date    Anxiety     Asthma     Cardiac disease     Cervical stenosis (uterine cervix)     Chest pain     Chronic kidney disease     Colitis     Colon polyps     COPD (chronic obstructive pulmonary disease) (HCC)     2L @ HS and PRN    Coronary artery disease     Diverticulitis     Esophageal reflux     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HCC)     Heart failure, systolic, due to idiopathic cardiomyopathy (HCC) 5/21/2018    Hyperlipidemia     Hypertension     IBD (inflammatory bowel disease)     Myocardial infarction (HCC)     Old myocardial infarction     Paroxysmal atrial fibrillation (HCC) 8/24/2018    Perforation of colon ([facility])     Type 2 diabetes, diet controlled (HCC)     Ulcerative colitis (HCC)      Social History     Socioeconomic History    Marital status:      Spouse name: None    Number of children: None    Years of education: None    Highest education level: None   Occupational History    None   Tobacco Use    Smoking status: Every Day     Types: Cigars    Smokeless tobacco: Never    Tobacco comments:     4-5cigars per day before nowadays smokes about 1 cigar, trying to cut down.   Vaping Use    Vaping status: Never Used   Substance and Sexual Activity    Alcohol use: Not Currently     Comment: last drink may 2023    Drug use: No    Sexual activity: Yes     Partners: Female     Birth control/protection: None   Other Topics Concern    None   Social History Narrative    None     Social Determinants of Health     Financial Resource Strain: Not on file   Food Insecurity: No Food Insecurity (2/9/2024)    Hunger Vital Sign     Worried About Running Out of Food in the Last Year: Never true     Ran Out of Food in the Last Year: Never true   Transportation  Needs: No Transportation Needs (2/9/2024)    PRAPARE - Transportation     Lack of Transportation (Medical): No     Lack of Transportation (Non-Medical): No   Physical Activity: Not on file   Stress: Not on file   Social Connections: Not on file   Intimate Partner Violence: Not on file   Housing Stability: Unknown (2/9/2024)    Housing Stability Vital Sign     Unable to Pay for Housing in the Last Year: No     Number of Times Moved in the Last Year: Not on file     Homeless in the Last Year: No       Subjective         Objective    Physical Exam:   Assessment Type: During-treatment  General Appearance: Alert, Awake  Chest Assessment: Chest expansion symmetrical  Bilateral Breath Sounds: Expiratory wheezes, Inspiratory wheezes    Vitals:  Blood pressure 109/55, pulse 64, temperature (!) 100.9 °F (38.3 °C), temperature source Tympanic, resp. rate (!) 30, SpO2 96%.          Imaging and other studies: I have personally reviewed pertinent reports.            Plan    Respiratory Plan: Vent/NIV/HFNC        Resp Comments: (P) pt admitted for resp distress, pt has hx of severe GOLD COPD, pt takes albuterol home resp meds, pt placed on bipap 14/8 50% rr12, spo2 is 97%, bs are exp wheeze/insp udn prn tx given via aerogen. will cont to monitor per resp protocol.

## 2024-09-03 LAB
ANION GAP SERPL CALCULATED.3IONS-SCNC: 8 MMOL/L (ref 4–13)
ANISOCYTOSIS BLD QL SMEAR: PRESENT
BACTERIA UR QL AUTO: ABNORMAL /HPF
BASOPHILS # BLD MANUAL: 0 THOUSAND/UL (ref 0–0.1)
BASOPHILS NFR MAR MANUAL: 0 % (ref 0–1)
BILIRUB UR QL STRIP: NEGATIVE
BUN SERPL-MCNC: 22 MG/DL (ref 5–25)
CALCIUM SERPL-MCNC: 7.3 MG/DL (ref 8.4–10.2)
CHLORIDE SERPL-SCNC: 104 MMOL/L (ref 96–108)
CLARITY UR: CLEAR
CO2 SERPL-SCNC: 27 MMOL/L (ref 21–32)
COLOR UR: ABNORMAL
CREAT SERPL-MCNC: 1.54 MG/DL (ref 0.6–1.3)
EOSINOPHIL # BLD MANUAL: 0 THOUSAND/UL (ref 0–0.4)
EOSINOPHIL NFR BLD MANUAL: 0 % (ref 0–6)
ERYTHROCYTE [DISTWIDTH] IN BLOOD BY AUTOMATED COUNT: 20.4 % (ref 11.6–15.1)
GFR SERPL CREATININE-BSD FRML MDRD: 47 ML/MIN/1.73SQ M
GLUCOSE SERPL-MCNC: 188 MG/DL (ref 65–140)
GLUCOSE UR STRIP-MCNC: ABNORMAL MG/DL
HCT VFR BLD AUTO: 37.2 % (ref 36.5–49.3)
HGB BLD-MCNC: 11.1 G/DL (ref 12–17)
HGB UR QL STRIP.AUTO: ABNORMAL
KETONES UR STRIP-MCNC: NEGATIVE MG/DL
LEUKOCYTE ESTERASE UR QL STRIP: NEGATIVE
LYMPHOCYTES # BLD AUTO: 0.25 THOUSAND/UL (ref 0.6–4.47)
LYMPHOCYTES # BLD AUTO: 3 % (ref 14–44)
MAGNESIUM SERPL-MCNC: 2.3 MG/DL (ref 1.9–2.7)
MCH RBC QN AUTO: 23.9 PG (ref 26.8–34.3)
MCHC RBC AUTO-ENTMCNC: 29.8 G/DL (ref 31.4–37.4)
MCV RBC AUTO: 80 FL (ref 82–98)
MONOCYTES # BLD AUTO: 0 THOUSAND/UL (ref 0–1.22)
MONOCYTES NFR BLD: 0 % (ref 4–12)
NEUTROPHILS # BLD MANUAL: 8.16 THOUSAND/UL (ref 1.85–7.62)
NEUTS BAND NFR BLD MANUAL: 16 % (ref 0–8)
NEUTS SEG NFR BLD AUTO: 81 % (ref 43–75)
NITRITE UR QL STRIP: NEGATIVE
NON-SQ EPI CELLS URNS QL MICRO: ABNORMAL /HPF
OVALOCYTES BLD QL SMEAR: PRESENT
PH UR STRIP.AUTO: 6 [PH]
PLATELET # BLD AUTO: 133 THOUSANDS/UL (ref 149–390)
PLATELET BLD QL SMEAR: ABNORMAL
PLATELET CLUMP BLD QL SMEAR: PRESENT
PMV BLD AUTO: 10.5 FL (ref 8.9–12.7)
POIKILOCYTOSIS BLD QL SMEAR: PRESENT
POLYCHROMASIA BLD QL SMEAR: PRESENT
POTASSIUM SERPL-SCNC: 4.3 MMOL/L (ref 3.5–5.3)
PROCALCITONIN SERPL-MCNC: 0.64 NG/ML
PROT UR STRIP-MCNC: ABNORMAL MG/DL
RBC # BLD AUTO: 4.65 MILLION/UL (ref 3.88–5.62)
RBC #/AREA URNS AUTO: ABNORMAL /HPF
RBC MORPH BLD: PRESENT
SODIUM SERPL-SCNC: 139 MMOL/L (ref 135–147)
SP GR UR STRIP.AUTO: 1.02 (ref 1–1.03)
UROBILINOGEN UR STRIP-ACNC: <2 MG/DL
WBC # BLD AUTO: 8.41 THOUSAND/UL (ref 4.31–10.16)
WBC #/AREA URNS AUTO: ABNORMAL /HPF

## 2024-09-03 PROCEDURE — 81001 URINALYSIS AUTO W/SCOPE: CPT | Performed by: INTERNAL MEDICINE

## 2024-09-03 PROCEDURE — 99223 1ST HOSP IP/OBS HIGH 75: CPT | Performed by: INTERNAL MEDICINE

## 2024-09-03 PROCEDURE — 83036 HEMOGLOBIN GLYCOSYLATED A1C: CPT | Performed by: INTERNAL MEDICINE

## 2024-09-03 PROCEDURE — 80048 BASIC METABOLIC PNL TOTAL CA: CPT | Performed by: INTERNAL MEDICINE

## 2024-09-03 PROCEDURE — 84145 PROCALCITONIN (PCT): CPT | Performed by: INTERNAL MEDICINE

## 2024-09-03 PROCEDURE — 83735 ASSAY OF MAGNESIUM: CPT | Performed by: INTERNAL MEDICINE

## 2024-09-03 PROCEDURE — 85007 BL SMEAR W/DIFF WBC COUNT: CPT | Performed by: INTERNAL MEDICINE

## 2024-09-03 PROCEDURE — 85027 COMPLETE CBC AUTOMATED: CPT | Performed by: INTERNAL MEDICINE

## 2024-09-03 PROCEDURE — 99232 SBSQ HOSP IP/OBS MODERATE 35: CPT | Performed by: PHYSICIAN ASSISTANT

## 2024-09-03 PROCEDURE — 94760 N-INVAS EAR/PLS OXIMETRY 1: CPT

## 2024-09-03 PROCEDURE — 94664 DEMO&/EVAL PT USE INHALER: CPT

## 2024-09-03 PROCEDURE — 94640 AIRWAY INHALATION TREATMENT: CPT

## 2024-09-03 RX ORDER — METHYLPREDNISOLONE SODIUM SUCCINATE 40 MG/ML
40 INJECTION, POWDER, LYOPHILIZED, FOR SOLUTION INTRAMUSCULAR; INTRAVENOUS EVERY 12 HOURS SCHEDULED
Status: DISCONTINUED | OUTPATIENT
Start: 2024-09-03 | End: 2024-09-04

## 2024-09-03 RX ORDER — GUAIFENESIN/DEXTROMETHORPHAN 100-10MG/5
10 SYRUP ORAL EVERY 4 HOURS PRN
Status: DISCONTINUED | OUTPATIENT
Start: 2024-09-03 | End: 2024-09-04 | Stop reason: HOSPADM

## 2024-09-03 RX ADMIN — NICOTINE 1 PATCH: 14 PATCH, EXTENDED RELEASE TRANSDERMAL at 09:11

## 2024-09-03 RX ADMIN — CEFTRIAXONE SODIUM 1000 MG: 10 INJECTION, POWDER, FOR SOLUTION INTRAVENOUS at 17:04

## 2024-09-03 RX ADMIN — METHYLPREDNISOLONE SODIUM SUCCINATE 40 MG: 40 INJECTION, POWDER, FOR SOLUTION INTRAMUSCULAR; INTRAVENOUS at 09:11

## 2024-09-03 RX ADMIN — METOPROLOL SUCCINATE 25 MG: 25 TABLET, EXTENDED RELEASE ORAL at 21:25

## 2024-09-03 RX ADMIN — LEVALBUTEROL HYDROCHLORIDE 1.25 MG: 1.25 SOLUTION RESPIRATORY (INHALATION) at 13:21

## 2024-09-03 RX ADMIN — SULFASALAZINE 500 MG: 500 TABLET ORAL at 21:35

## 2024-09-03 RX ADMIN — METHYLPREDNISOLONE SODIUM SUCCINATE 40 MG: 40 INJECTION, POWDER, FOR SOLUTION INTRAMUSCULAR; INTRAVENOUS at 00:15

## 2024-09-03 RX ADMIN — SULFASALAZINE 500 MG: 500 TABLET ORAL at 00:15

## 2024-09-03 RX ADMIN — IPRATROPIUM BROMIDE 0.5 MG: 0.5 SOLUTION RESPIRATORY (INHALATION) at 20:06

## 2024-09-03 RX ADMIN — IPRATROPIUM BROMIDE 0.5 MG: 0.5 SOLUTION RESPIRATORY (INHALATION) at 07:18

## 2024-09-03 RX ADMIN — BUDESONIDE AND FORMOTEROL FUMARATE DIHYDRATE 2 PUFF: 160; 4.5 AEROSOL RESPIRATORY (INHALATION) at 09:12

## 2024-09-03 RX ADMIN — HEPARIN SODIUM 5000 UNITS: 5000 INJECTION INTRAVENOUS; SUBCUTANEOUS at 05:30

## 2024-09-03 RX ADMIN — BENZONATATE 100 MG: 100 CAPSULE ORAL at 05:30

## 2024-09-03 RX ADMIN — SULFASALAZINE 500 MG: 500 TABLET ORAL at 09:11

## 2024-09-03 RX ADMIN — LEVALBUTEROL HYDROCHLORIDE 1.25 MG: 1.25 SOLUTION RESPIRATORY (INHALATION) at 20:06

## 2024-09-03 RX ADMIN — GUAIFENESIN AND DEXTROMETHORPHAN 10 ML: 100; 10 SYRUP ORAL at 09:11

## 2024-09-03 RX ADMIN — UMECLIDINIUM 1 PUFF: 62.5 AEROSOL, POWDER ORAL at 15:06

## 2024-09-03 RX ADMIN — ATORVASTATIN CALCIUM 40 MG: 40 TABLET, FILM COATED ORAL at 17:04

## 2024-09-03 RX ADMIN — SODIUM CHLORIDE 75 ML/HR: 0.9 INJECTION, SOLUTION INTRAVENOUS at 22:01

## 2024-09-03 RX ADMIN — IPRATROPIUM BROMIDE 0.5 MG: 0.5 SOLUTION RESPIRATORY (INHALATION) at 13:21

## 2024-09-03 RX ADMIN — LEVALBUTEROL HYDROCHLORIDE 1.25 MG: 1.25 SOLUTION RESPIRATORY (INHALATION) at 07:18

## 2024-09-03 RX ADMIN — PANTOPRAZOLE SODIUM 40 MG: 40 TABLET, DELAYED RELEASE ORAL at 09:11

## 2024-09-03 RX ADMIN — ASPIRIN 81 MG CHEWABLE TABLET 81 MG: 81 TABLET CHEWABLE at 09:11

## 2024-09-03 RX ADMIN — BUDESONIDE AND FORMOTEROL FUMARATE DIHYDRATE 2 PUFF: 160; 4.5 AEROSOL RESPIRATORY (INHALATION) at 17:04

## 2024-09-03 RX ADMIN — SULFASALAZINE 500 MG: 500 TABLET ORAL at 17:04

## 2024-09-03 RX ADMIN — METHYLPREDNISOLONE SODIUM SUCCINATE: 40 INJECTION, POWDER, FOR SOLUTION INTRAMUSCULAR; INTRAVENOUS at 21:25

## 2024-09-03 RX ADMIN — ALBUTEROL SULFATE 2 PUFF: 90 AEROSOL, METERED RESPIRATORY (INHALATION) at 21:25

## 2024-09-03 RX ADMIN — HEPARIN SODIUM 5000 UNITS: 5000 INJECTION INTRAVENOUS; SUBCUTANEOUS at 15:07

## 2024-09-03 RX ADMIN — HEPARIN SODIUM 5000 UNITS: 5000 INJECTION INTRAVENOUS; SUBCUTANEOUS at 21:25

## 2024-09-03 RX ADMIN — SULFASALAZINE 500 MG: 500 TABLET ORAL at 12:31

## 2024-09-03 NOTE — PLAN OF CARE
Problem: PAIN - ADULT  Goal: Verbalizes/displays adequate comfort level or baseline comfort level  Description: Interventions:  - Encourage patient to monitor pain and request assistance  - Assess pain using appropriate pain scale  - Administer analgesics based on type and severity of pain and evaluate response  - Implement non-pharmacological measures as appropriate and evaluate response  - Consider cultural and social influences on pain and pain management  - Notify physician/advanced practitioner if interventions unsuccessful or patient reports new pain  Outcome: Progressing     Problem: INFECTION - ADULT  Goal: Absence or prevention of progression during hospitalization  Description: INTERVENTIONS:  - Assess and monitor for signs and symptoms of infection  - Monitor lab/diagnostic results  - Monitor all insertion sites, i.e. indwelling lines, tubes, and drains  - Monitor endotracheal if appropriate and nasal secretions for changes in amount and color  - [place] appropriate cooling/warming therapies per order  - Administer medications as ordered  - Instruct and encourage patient and family to use good hand hygiene technique  - Identify and instruct in appropriate isolation precautions for identified infection/condition  Outcome: Progressing     Problem: SAFETY ADULT  Goal: Patient will remain free of falls  Description: INTERVENTIONS:  - Educate patient/family on patient safety including physical limitations  - Instruct patient to call for assistance with activity   - Consult OT/PT to assist with strengthening/mobility   - Keep Call bell within reach  - Keep bed low and locked with side rails adjusted as appropriate  - Keep care items and personal belongings within reach  - Initiate and maintain comfort rounds  - Make Fall Risk Sign visible to staff  - Offer Toileting every 2 Hours, in advance of need  - Initiate/Maintain bed/chair alarm  - Obtain necessary fall risk management equipment: slippers  Problem:  DISCHARGE PLANNING  Goal: Discharge to home or other facility with appropriate resources  Description: INTERVENTIONS:  - Identify barriers to discharge w/patient and caregiver  - Arrange for needed discharge resources and transportation as appropriate  - Identify discharge learning needs (meds, wound care, etc.)  - Arrange for interpretive services to assist at discharge as needed  - Refer to Case Management Department for coordinating discharge planning if the patient needs post-hospital services based on physician/advanced practitioner order or complex needs related to functional status, cognitive ability, or social support system  Outcome: Progressing     Problem: Knowledge Deficit  Goal: Patient/family/caregiver demonstrates understanding of disease process, treatment plan, medications, and discharge instructions  Description: Complete learning assessment and assess knowledge base.  Interventions:  - Provide teaching at level of understanding  - Provide teaching via preferred learning methods  Outcome: Progressing     - Apply yellow socks and bracelet for high fall risk patients  - Consider moving patient to room near nurses station  Outcome: Progressing

## 2024-09-03 NOTE — ASSESSMENT & PLAN NOTE
Baseline creatinine approximately 1.3-1.5, presented with a creatinine of 1.96  Suspect secondary to volume depletion  Resolved with IV fluids, will discontinue

## 2024-09-03 NOTE — ASSESSMENT & PLAN NOTE
Noted to be 58, 55, 41  likely secondary to acute respiratory failure  EKG without any signs of acute ischemia

## 2024-09-03 NOTE — DISCHARGE INSTR - AVS FIRST PAGE
You will need repeat CT chest in 6 months to monitor pulmonary nodules     Take Augmentin and Azithromycin for 3 more days.  Take prednisone 40 mg daily x 3 days, then 30 mg daily x 3 days, then 20 mg daily x 3 days, then 10 mg daily x 3 days.  Follow up with pulmonology and primary care on discharge.

## 2024-09-03 NOTE — UTILIZATION REVIEW
Initial Clinical Review    Admission: Date/Time/Statement:   Admission Orders (From admission, onward)       Ordered        09/02/24 1508  Inpatient Admission  Once                          Orders Placed This Encounter   Procedures    Inpatient Admission     Standing Status:   Standing     Number of Occurrences:   1     Order Specific Question:   Level of Care     Answer:   Med Surg [16]     Order Specific Question:   Estimated length of stay     Answer:   More than 2 Midnights     Order Specific Question:   Certification     Answer:   I certify that inpatient services are medically necessary for this patient for a duration of greater than two midnights. See H&P and MD Progress Notes for additional information about the patient's course of treatment.     ED Arrival Information       Expected   -    Arrival   9/2/2024 09:36    Acuity   Emergent              Means of arrival   Ambulance    Escorted by   [facility]    Service   Hospitalist    Admission type   Emergency              Arrival complaint   Respiratory distress             Chief Complaint   Patient presents with    Shortness of Breath     Pt c/o sob x a few days, hx copd, per ems pt ran out of home asthma meds, ems gave duo neb albuterol and started mag. Ems placed pt on cpap. Switched to bipap on arrival        Initial Presentation: 64 y.o. male with PMHx includes COPD, CHF, coronary artery disease, CKD, who presented on 9/2/24 to [facility] ED via EMS due to shortness of breath and cough. Ran out of inhalers 3 days prior.  On exam, wheezing noted. Febrile at 100.9, tachycardic up to 122, tachypneic at 28. Labs revealed WBC 11.48, Cr 1.96, glucose 178, trop 58, procal 1.12, .  CTA chest PE study revealed Bilateral predominantly lower lobe mild bronchial wall thickening with foci of mucous plugging may be related to bronchitis. Mild left basilar atelectasis. Scattered small groundglass opacities may be inflammatory. Moderate  emphysema. In the ED, given neb tx, IV steroid, mag repleted, IV azithromycin and ceftriaxone, IVF.     Plan:  Admit Inpatient status to med surg dt Acute exacerbation of chronic obstructive pulmonary disease:  supplemental O2 and wean as able, IV steroid, scheduled neb txs, monitor WBC and fever curve, BMP in AM, continue home meds. IO, SCDs, cardiac diet. Pulmonology consult.     Anticipated Length of Stay/Certification Statement: Patient will be admitted on an Emergency basis with an anticipated length of stay of  > 2 midnights.      Date: 9/2   Day 2:  Wheezing continues and conversational dyspnea. Currently on 5L NC O2, baseline 4L NC. Continue IV ceftriaxone, monitor VS and labs, continue home meds.     9/2 Per Pulmonology: Monitor O2 and supplement as needed for goal sat more than 88%. Patient will likely require ambulatory O2 assessment prior to discharge.  Continue with methylprednisolone --> can titrate to BID today and daily as of tomorrow depending on pt clinical response. Counseled patient on tobacco cessation and supplement with nicotine patches as needed. To continue with ipratropium and Xopenex nebulizations 3 times daily, ontinue with Symbicort 2 puffs twice daily and add umeclidinium starting today. Antibiotics per primary team. Patient will require demonstration of inhaler use & care manager to assist with obtaining inhalers & nebulization supply prior to discharge. Patient will require formal PFTs in outpt setting once out of acute exacerbation.    ED Triage Vitals   Temperature Pulse Respirations Blood Pressure SpO2 Pain Score   09/02/24 0939 09/02/24 0939 09/02/24 0939 09/02/24 0939 09/02/24 0939 09/02/24 2100   (!) 100.9 °F (38.3 °C) 64 (!) 28 109/55 96 % No Pain     Weight (last 2 days)       None            Vital Signs (last 3 days)       Date/Time Temp Pulse Resp BP MAP (mmHg) SpO2 Calculated FIO2 (%) - Nasal Cannula Nasal Cannula O2 Flow Rate (L/min) O2 Device O2 Interface Device Pain     09/03/24 0800 -- -- -- -- -- -- 40 5 L/min -- -- --    09/03/24 07:39:32 97.7 °F (36.5 °C) 84 -- 117/66 83 96 % -- -- -- -- --    09/03/24 0723 -- -- -- -- -- 97 % -- -- -- -- --    09/02/24 22:00:49 98 °F (36.7 °C) 105 20 112/67 82 87 % -- -- None (Room air) -- --    09/02/24 2100 -- -- -- -- -- -- -- -- -- -- No Pain    09/02/24 2010 -- -- -- -- -- 96 % 44 6 L/min Nasal cannula -- --    09/02/24 1600 -- 96 18 107/77 88 94 % 40 5 L/min Nasal cannula -- --    09/02/24 1500 -- 98 -- 117/73 89 97 % -- -- Nasal cannula -- --    09/02/24 1400 -- 104 22 109/69 84 97 % 40 5 L/min Nasal cannula -- --    09/02/24 1340 -- -- -- -- -- -- -- -- Nasal cannula -- --    09/02/24 1335 -- 100 22 -- -- 96 % -- -- -- -- --    09/02/24 1300 -- 106 26 108/65 81 97 % -- -- BiPAP -- --    09/02/24 1245 -- 106 28 96/60 73 98 % -- -- BiPAP -- --    09/02/24 1215 -- 108 26 101/65 79 97 % -- -- BiPAP -- --    09/02/24 1208 -- -- -- -- -- 97 % -- -- -- -- --    09/02/24 1145 -- 110 26 99/60 74 98 % -- -- BiPAP -- --    09/02/24 1100 -- 116 32 108/76 87 97 % -- -- BiPAP -- --    09/02/24 1045 -- 122 33 105/78 88 98 % -- -- BiPAP -- --    09/02/24 1000 -- -- -- -- -- -- -- -- -- Face mask --    09/02/24 0947 -- -- 30 -- -- -- -- -- BiPAP -- --    09/02/24 0939 100.9 °F (38.3 °C) 64 28 109/55 -- 96 % -- -- BiPAP -- --              Pertinent Labs/Diagnostic Test Results:   Radiology:  CTA ED chest PE study   Final Interpretation by Yaima Almazan MD (09/02 9010)      1. No pulmonary embolism.      2. Bilateral predominantly lower lobe mild bronchial wall thickening with foci of mucous plugging may be related to bronchitis. Mild left basilar atelectasis.      3. Scattered small groundglass opacities may be inflammatory.   Based on current Fleischner Society 2017 Guidelines on incidental pulmonary nodule, followup noncontrast CT is recommended at 6-12 months from the initial examination to exclude persistence. A thin-walled air-filled structure  in the left lower lobe    measuring 1.3 cm can be reassessed on follow-up.      3. Moderate emphysema.         The study was marked in EPIC for immediate notification.                     Workstation performed: [id number]         XR chest 1 view portable   ED Interpretation by Galindo Chung DO (09/02 1052)   Hyperinflated lungs, no sign of focal consolidation suggestive of pneumonia      Final Interpretation by Justice Posey MD (09/03 0911)      No acute cardiopulmonary disease.            Workstation performed: [id number]           Cardiology:  No orders to display     GI:  No orders to display           Results from last 7 days   Lab Units 09/03/24  0509 09/02/24  1005   WBC Thousand/uL 8.41 11.48*   HEMOGLOBIN g/dL 11.1* 13.7   HEMATOCRIT % 37.2 44.8   PLATELETS Thousands/uL 133* 163   TOTAL NEUT ABS Thousands/µL  --  9.70*   BANDS PCT % 16*  --          Results from last 7 days   Lab Units 09/03/24  0509 09/02/24  1031   SODIUM mmol/L 139 141   POTASSIUM mmol/L 4.3 4.5   CHLORIDE mmol/L 104 102   CO2 mmol/L 27 26   ANION GAP mmol/L 8 13   BUN mg/dL 22 19   CREATININE mg/dL 1.54* 1.96*   EGFR ml/min/1.73sq m 47 35   CALCIUM mg/dL 7.3* 8.5   MAGNESIUM mg/dL 2.3  --      Results from last 7 days   Lab Units 09/02/24  1031   AST U/L 25   ALT U/L 27   ALK PHOS U/L 95   TOTAL PROTEIN g/dL 6.7   ALBUMIN g/dL 3.8   TOTAL BILIRUBIN mg/dL 0.57         Results from last 7 days   Lab Units 09/03/24  0509 09/02/24  1031   GLUCOSE RANDOM mg/dL 188* 178*     Results from last 7 days   Lab Units 09/02/24  1518 09/02/24  1256 09/02/24  1056   HS TNI 0HR ng/L  --   --  58*   HS TNI 2HR ng/L  --  55*  --    HSTNI D2 ng/L  --  -3  --    HS TNI 4HR ng/L 41  --   --    HSTNI D4 ng/L -17  --   --      Results from last 7 days   Lab Units 09/02/24  1005   D-DIMER QUANTITATIVE ug/ml FEU 4.14*     Results from last 7 days   Lab Units 09/02/24  1005   PROTIME seconds 13.6   INR  1.01   PTT seconds 30         Results from last 7 days    Lab Units 09/03/24  0509 09/02/24  1031   PROCALCITONIN ng/ml 0.64* 1.12*     Results from last 7 days   Lab Units 09/02/24  1031   LACTIC ACID mmol/L 1.0     Results from last 7 days   Lab Units 09/02/24  1005   BNP pg/mL 108*     Results from last 7 days   Lab Units 09/02/24  1128 09/02/24  1031   BLOOD CULTURE  Received in Microbiology Lab. Culture in Progress. Received in Microbiology Lab. Culture in Progress.     ED Treatment-Medication Administration from 09/02/2024 0936 to 09/02/2024 1725         Date/Time Order Dose Route Action     09/02/2024 0946 albuterol (FOR EMS ONLY) (2.5 mg/3 mL) 0.083 % inhalation solution 2.5 mg 0 mg Does not apply Given to EMS     09/02/2024 0946 magnesium sulfate (FOR EMS ONLY) 2 g/50 mL IVPB (premix) 2 g 0 g Does not apply Given to EMS     09/02/2024 0946 ipratropium-albuterol (FOR EMS ONLY) (DUO-NEB) 0.5-2.5 mg/3 mL inhalation solution 3 mL 0 mL Does not apply Given to EMS     09/02/2024 0946 methylPREDNISolone sodium succinate (FOR EMS ONLY) (Solu-MEDROL) 125 MG injection 125 mg 0 mg Does not apply Given to EMS     09/02/2024 0951 ipratropium-albuterol (DUO-NEB) 0.5-2.5 mg/3 mL inhalation solution 3 mL 3 mL Nebulization Given     09/02/2024 1011 acetaminophen (Ofirmev) injection 1,000 mg 1,000 mg Intravenous New Bag     09/02/2024 1051 magnesium sulfate 2 g/50 mL IVPB (premix) 2 g 2 g Intravenous New Bag     09/02/2024 1243 azithromycin (ZITHROMAX) 500 mg in sodium chloride 0.9% 250mL IVPB 500 mg 500 mg Intravenous New Bag     09/02/2024 1051 sodium chloride 0.9 % bolus 500 mL 500 mL Intravenous New Bag     09/02/2024 1208 albuterol inhalation solution 10 mg 10 mg Nebulization Given     09/02/2024 1209 ipratropium (ATROVENT) 0.02 % inhalation solution 1 mg 1 mg Nebulization Given     09/02/2024 1209 sodium chloride 0.9 % inhalation solution 12 mL 12 mL Nebulization Given     09/02/2024 1353 iohexol (OMNIPAQUE) 350 MG/ML injection (MULTI-DOSE) 85 mL 85 mL Intravenous Given      09/02/2024 1552 nicotine (NICODERM CQ) 14 mg/24hr TD 24 hr patch 1 patch 1 patch Transdermal Medication Applied     09/02/2024 1552 ceftriaxone (ROCEPHIN) 1 g/50 mL in dextrose IVPB 1,000 mg Intravenous New Bag     09/02/2024 1552 methylPREDNISolone sodium succinate (Solu-MEDROL) injection 40 mg 40 mg Intravenous Given     09/02/2024 1558 sodium chloride 0.9 % infusion 100 mL/hr Intravenous New Bag            Past Medical History:   Diagnosis Date    Anxiety     Asthma     Cardiac disease     Cervical stenosis (uterine cervix)     Chest pain     Chronic kidney disease     Colitis     Colon polyps     COPD (chronic obstructive pulmonary disease) ([facility])     2L @ HS and PRN    Coronary artery disease     Diverticulitis     Esophageal reflux     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[facility])     Heart failure, systolic, due to idiopathic cardiomyopathy ([facility]) 5/21/2018    Hyperlipidemia     Hypertension     IBD (inflammatory bowel disease)     Myocardial infarction ([facility])     Old myocardial infarction     Paroxysmal atrial fibrillation ([facility]) 8/24/2018    Perforation of colon ([facility])     Type 2 diabetes, diet controlled ([facility])     Ulcerative colitis ([facility])      Present on Admission:   Coronary artery disease involving native coronary artery of native heart without angina pectoris   Acute exacerbation of chronic obstructive pulmonary disease (COPD) ([facility])   VT (ventricular tachycardia) ([facility])   Crohn disease ([facility])   DEBBY (acute kidney injury) ([facility])   Elevated troponin   Multiple pulmonary nodules      Admitting Diagnosis: Shortness of breath [R06.02]  CHF (congestive heart failure) ([facility]) [I50.9]  COPD with acute exacerbation ([facility]) [J44.1]  Acute on chronic respiratory failure ([facility]) [J96.20]  Age/Sex: 64 y.o. male  Admission Orders:  Scheduled Medications:  aspirin, 81 mg, Oral, Daily  atorvastatin, 40 mg, Oral, Daily With Dinner  budesonide-formoterol, 2 puff, Inhalation, BID  cefTRIAXone, 1,000 mg, Intravenous, Q24H  heparin (porcine), 5,000  Units, Subcutaneous, Q8H JUVENTINO  ipratropium, 0.5 mg, Nebulization, TID  levalbuterol, 1.25 mg, Nebulization, TID  methylPREDNISolone sodium succinate, 40 mg, Intravenous, Q8H  metoprolol succinate, 25 mg, Oral, HS  nicotine, 1 patch, Transdermal, Daily  pantoprazole, 40 mg, Oral, Daily  sulfaSALAzine, 500 mg, Oral, 4x Daily      Continuous IV Infusions:  sodium chloride, 75 mL/hr, Intravenous, Continuous      PRN Meds:  acetaminophen, 650 mg, Oral, Q6H PRN  albuterol, 2 puff, Inhalation, Q4H PRN  benzonatate, 100 mg, Oral, TID PRN x1  dextromethorphan-guaiFENesin, 10 mL, Oral, Q4H PRN x1        IP CONSULT TO PULMONOLOGY    Network Utilization Review Department  ATTENTION: Please call with any questions or concerns to [phone] and carefully listen to the prompts so that you are directed to the right person. All voicemails are confidential.   For Discharge needs, contact Care Management DC Support Team at [phone] opt. 2  Send all requests for admission clinical reviews, approved or denied determinations and any other requests to dedicated fax number below belonging to the campus where the patient is receiving treatment. List of dedicated fax numbers for the Facilities:  FACILITY NAME UR FAX NUMBER   ADMISSION DENIALS (Administrative/Medical Necessity) [phone]   DISCHARGE SUPPORT TEAM (NETWORK) [phone]   PARENT CHILD HEALTH (Maternity/NICU/Pediatrics) [phone]   [facility] [phone]   [facility] [phone]   [facility] [phone]   [facility] [phone]   [facility] [phone]   [facility] [phone]   [facility] [phone]   [facility] [phone]   [facility] [phone]   [facility] [phone]   [facility]  [facility] [phone]   [facility] [phone]

## 2024-09-03 NOTE — PROGRESS NOTES
[facility]  Progress Note  Name: Mathew Bird I  MRN: [id number]  Unit/Bed#: CW2 213-01 I Date of Admission: 9/2/2024   Date of Service: 9/3/2024 I Hospital Day: 1    Assessment & Plan   * Acute exacerbation of chronic obstructive pulmonary disease (COPD) (Formerly [facility])  Assessment & Plan  Presented with shortness of breath and diffuse wheezing initially requiring continuous BiPAP since transition to nasal cannula.  Reports ran out of medication approximately 3 days ago  Likely in setting of acute bronchitis as noted on CT scan and running out of his maintenance medication   At baseline on 4 L nasal cannula  With elevated procalcitonin ordered IV ceftriaxone  IV solu-medrol 40mg 3 times daily  Scheduled nebs  Wean oxygen as able -- currently on 5 L NC.  Baseline 4 L NC   Pt takes Breztri, reports he is struggling with this as medication doesn't always administer.  Will consult pulm to follow for IP management and medicaiton recs for discharge     Bronchitis  Assessment & Plan  Presented with WBC of 11K, noted to have a fever 100.9 degrees in Emergency Department  Procalcitonin elevated   Suspect likely secondary to bronchitis is noted on CT scan  IV ceftriaxone   Monitor WBC count & fever curve    Acute on chronic respiratory failure (HCC)  Assessment & Plan  At baseline requires 4 L nasal cannula secondary to COPD  Initially required continuous BiPAP  Has since been weaned to nasal cannula  Continue IV steroids and scheduled nebs outlined above    VT (ventricular tachycardia) (Formerly [facility])  Assessment & Plan  History of VT in the past  Continue Toprol    Multiple pulmonary nodules  Assessment & Plan  Noted on CT imaging  Repeat chest CT in 6 months     DEBBY (acute kidney injury) (Formerly [facility])  Assessment & Plan  Baseline creatinine approximately 1.3-1.5, presented with a creatinine of 1.96  Suspect secondary to volume depletion  Resolved with IV fluids, will discontinue     Elevated  troponin  Assessment & Plan  Noted to be 58, 55, 41  likely secondary to acute respiratory failure  EKG without any signs of acute ischemia     Coronary artery disease involving native coronary artery of native heart without angina pectoris  Assessment & Plan  Status post PCI  Continue aspirin, statin, beta-blocker    Crohn disease (HCC)  Assessment & Plan  Continue sulfasalazine           VTE Pharmacologic Prophylaxis: VTE Score: 6 High Risk (Score >/= 5) - Pharmacological DVT Prophylaxis Ordered: heparin. Sequential Compression Devices Ordered.    Mobility:   Basic Mobility Inpatient Raw Score: 24  JH-HLM Goal: 8: Walk 250 feet or more  JH-HLM Achieved: 8: Walk 250 feet ot more  JH-HLM Goal achieved. Continue to encourage appropriate mobility.    Patient Centered Rounds: I performed bedside rounds with nursing staff today.   Discussions with Specialists or Other Care Team Provider: pulmonary    Education and Discussions with Family / Patient: Patient declined call to .     Total Time Spent on Date of Encounter in care of patient: 25 mins. This time was spent on one or more of the following: performing physical exam; counseling and coordination of care; obtaining or reviewing history; documenting in the medical record; reviewing/ordering tests, medications or procedures; communicating with other healthcare professionals and discussing with patient's family/caregivers.    Current Length of Stay: 1 day(s)  Current Patient Status: Inpatient   Certification Statement: The patient will continue to require additional inpatient hospital stay due to COPD exacerbation, on IV steroids, pending pulm eval  Discharge Plan: Anticipate discharge in 24-48 hrs to home.    Code Status: Level 1 - Full Code    Subjective:   No acute complaints, he feels a lot better than he did yesterday.  He uses Breztri at home but reports he has trouble using this as medication doesn't always administer.  He runs out before he is due  for his next refill of the medication.      Objective:     Vitals:   Temp (24hrs), Av.9 °F (36.6 °C), Min:97.7 °F (36.5 °C), Max:98 °F (36.7 °C)    Temp:  [97.7 °F (36.5 °C)-98 °F (36.7 °C)] 97.7 °F (36.5 °C)  HR:  [] 84  Resp:  [18-33] 20  BP: ()/(60-78) 117/66  SpO2:  [87 %-98 %] 96 %  There is no height or weight on file to calculate BMI.     Input and Output Summary (last 24 hours):     Intake/Output Summary (Last 24 hours) at 9/3/2024 0944  Last data filed at 2024 1322  Gross per 24 hour   Intake 900 ml   Output --   Net 900 ml       Physical Exam:   Physical Exam  Vitals reviewed.   Constitutional:       General: He is not in acute distress.     Appearance: He is not toxic-appearing.   HENT:      Head: Normocephalic and atraumatic.   Eyes:      Extraocular Movements: Extraocular movements intact.   Cardiovascular:      Rate and Rhythm: Normal rate and regular rhythm.   Pulmonary:      Breath sounds: Wheezing present.      Comments: Conversational dyspnea.    Musculoskeletal:         General: Normal range of motion.   Skin:     General: Skin is warm and dry.   Neurological:      General: No focal deficit present.      Mental Status: He is alert and oriented to person, place, and time.   Psychiatric:         Mood and Affect: Mood normal.         Behavior: Behavior normal.         Thought Content: Thought content normal.          Additional Data:     Labs:  Results from last 7 days   Lab Units 24  0509 24  1005   WBC Thousand/uL 8.41 11.48*   HEMOGLOBIN g/dL 11.1* 13.7   HEMATOCRIT % 37.2 44.8   PLATELETS Thousands/uL 133* 163   BANDS PCT % 16*  --    SEGS PCT %  --  85*   LYMPHO PCT % 3* 7*   MONO PCT % 0* 7   EOS PCT % 0 0     Results from last 7 days   Lab Units 24  0509 24  1031   SODIUM mmol/L 139 141   POTASSIUM mmol/L 4.3 4.5   CHLORIDE mmol/L 104 102   CO2 mmol/L 27 26   BUN mg/dL 22 19   CREATININE mg/dL 1.54* 1.96*   ANION GAP mmol/L 8 13   CALCIUM mg/dL 7.3*  8.5   ALBUMIN g/dL  --  3.8   TOTAL BILIRUBIN mg/dL  --  0.57   ALK PHOS U/L  --  95   ALT U/L  --  27   AST U/L  --  25   GLUCOSE RANDOM mg/dL 188* 178*     Results from last 7 days   Lab Units 09/02/24  1005   INR  1.01             Results from last 7 days   Lab Units 09/03/24  0509 09/02/24  1031   LACTIC ACID mmol/L  --  1.0   PROCALCITONIN ng/ml 0.64* 1.12*       Lines/Drains:  Invasive Devices       Peripheral Intravenous Line  Duration             Peripheral IV 09/02/24 Dorsal (posterior);Left Hand <1 day    Peripheral IV 09/02/24 Right;Ventral (anterior) Forearm <1 day                          Imaging: No pertinent imaging reviewed.    Recent Cultures (last 7 days):   Results from last 7 days   Lab Units 09/02/24  1128 09/02/24  1031   BLOOD CULTURE  Received in Microbiology Lab. Culture in Progress. Received in Microbiology Lab. Culture in Progress.       Last 24 Hours Medication List:   Current Facility-Administered Medications   Medication Dose Route Frequency Provider Last Rate    acetaminophen  650 mg Oral Q6H PRN Luciano Echevarria MD      albuterol  2 puff Inhalation Q4H PRN Luciano Echevarria MD      aspirin  81 mg Oral Daily Luciano Echevarria MD      atorvastatin  40 mg Oral Daily With Dinner Luciano Echevarria MD      benzonatate  100 mg Oral TID PRN Luciano Echevarria MD      budesonide-formoterol  2 puff Inhalation BID Luciano Echevarria MD      cefTRIAXone  1,000 mg Intravenous Q24H Luciano Echevarria MD 1,000 mg (09/02/24 1552)    dextromethorphan-guaiFENesin  10 mL Oral Q4H PRN Niyah Ambriz PA-C      heparin (porcine)  5,000 Units Subcutaneous Q8H [facility] Luciano Echevarria MD      ipratropium  0.5 mg Nebulization TID Luciano Echevarria MD      levalbuterol  1.25 mg Nebulization TID Luciano Echevarria MD      methylPREDNISolone sodium succinate  40 mg Intravenous Q8H Luciano Echevarria MD      metoprolol succinate  25 mg Oral HS Luciano Echevarria MD      nicotine  1 patch Transdermal Daily Luciano Echevarria MD      pantoprazole  40 mg Oral Daily Luciano  MD Wale      sodium chloride  75 mL/hr Intravenous Continuous Niyah Ambriz PA-C 75 mL/hr (09/03/24 0821)    sulfaSALAzine  500 mg Oral 4x Daily Luciano Echevarria MD          Today, Patient Was Seen By: Rachel Denton PA-C    **Please Note: This note may have been constructed using a voice recognition system.**

## 2024-09-03 NOTE — CONSULTS
Consult Note - Pulmonary   Mathew Bird 64 y.o. male MRN: [id number]  Unit/Bed#: CW2 213-01 Encounter: [id number]      Reason for consultation: COPD Exacerbation    Requesting provider: Rachel Denton     Assessment & Recommendations:   COPD exacerbation  Tobacco dependence  Medication noncompliance    64-year-old male presents medical with respiratory COPD presenting with shortness of breath admitted for COPD exacerbation likely in setting of medication noncompliance/continued smoking.    Recommendations   - Monitor O2 and supplement as needed for goal sat more than 88%   - Patient will likely require ambulatory O2 assessment prior to discharge  - Continue with methylprednisolone --> can titrate to BID today and daily as of tomorrow depending on pt clinical response.  - Counseled patient on tobacco cessation and supplement with nicotine patches as needed   - To continue with ipratropium and Xopenex nebulizations 3 times daily  - To continue with Symbicort 2 puffs twice daily and add umeclidinium starting today.  - Antibiotics per primary team  -Patient will require demonstration of inhaler use & care manager to assist with obtaining inhalers & nebulization supply prior to discharge   - Patient will require formal PFTs in outpt setting once out of acute exacerbation    History of Present Illness   HPI:  Mathew Bird is a 64 y.o. male present smoker (smokes 2 cigars daily), h/o very severe COPD on 4 L of oxygen at home as needed, CHF, CAD S/P PCI, CKD, Crohns Dx S/P colectomy admitted for COPD exacerbation after presenting with 2 days of progressively worsening sob.     Reports being in his baseline state of health up until the past week where he noted progressively worsening shortness of breath accompanied by nonproductive cough, chest tightness and wheeze.  Attempted to use home nebulizations which provided temporary relief promting his presentation to the ED. Denies fever chills ill contacts. Patient reports  issues with obtaining refills for inhalers and was thus out of of medications for 1 week despite samples given in clinic. He also notes issues with using breztri inhaler due to reported faulty pump mechanism thus affecting his compliance.   Pulmonary consulted to assist with COPD management.     CT PE done upon presentation which was negative for PE however revealed scattered groundglass opacities and moderate emphysematous changes.     On evaluation of patient at bedside patient in no respiratory distress saturating 95% on 5 L of O2 via nasal cannula with expiratory wheezing auscultated throughout. Reported feeling overall improved since presentation.     Patient follows with pulmonary office. No PFTs documented.    Patient has a few cats and a bird. Currently works at home with no reported hazardous inhalation.     Review of systems:  12 point review of systems was completed and was otherwise negative except as listed in HPI.      Historical Information   Past Medical History:   Diagnosis Date    Anxiety     Asthma     Cardiac disease     Cervical stenosis (uterine cervix)     Chest pain     Chronic kidney disease     Colitis     Colon polyps     COPD (chronic obstructive pulmonary disease) (HCC)     2L @ HS and PRN    Coronary artery disease     Diverticulitis     Esophageal reflux     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HCC)     Heart failure, systolic, due to idiopathic cardiomyopathy (HCC) 5/21/2018    Hyperlipidemia     Hypertension     IBD (inflammatory bowel disease)     Myocardial infarction (HCC)     Old myocardial infarction     Paroxysmal atrial fibrillation (HCC) 8/24/2018    Perforation of colon (HCC)     Type 2 diabetes, diet controlled (HCC)     Ulcerative colitis (HCC)      Past Surgical History:   Procedure Laterality Date    ANGIOPLASTY      APPENDECTOMY      COLON SURGERY      COLONOSCOPY N/A 10/24/2016    Procedure: COLONOSCOPY;  Surgeon: Tirso Juárez MD;  Location: [facility];  Service:     CORONARY  ANGIOPLASTY WITH STENT PLACEMENT      ESOPHAGOGASTRODUODENOSCOPY N/A 10/24/2016    Procedure: ESOPHAGOGASTRODUODENOSCOPY (EGD);  Surgeon: Tirso Juárez MD;  Location: [facility];  Service:     EXPLORATORY LAPAROTOMY W/ BOWEL RESECTION N/A 5/22/2018    Procedure: EXPLORATORY LAPAROTOMY, ILIOCOLECTOMY, ILIOCOLONIC ANASTAMOSIS, LOOP ILIOSTOMY, REPAIR OF SEROSAL TEAR, EXTENSIVE LYSIS OF ADHESIONS, WOUND VAC PLACEMENT;  Surgeon: Tirso Juárez MD;  Location: [facility];  Service: Colorectal    HEMICOLECTOMY      ILEOSTOMY CLOSURE N/A 10/5/2018    Procedure: CLOSURE ILEOSTOMY;  Surgeon: Tirso Juárez MD;  Location: [facility];  Service: Colorectal    OTHER SURGICAL HISTORY      stent indications acute myocardial infarction    OR COLONOSCOPY FLX DX W/COLLJ SPEC WHEN PFRMD N/A 2/6/2018    Procedure: COLONOSCOPY;  Surgeon: Tirso Juárez MD;  Location: [facility];  Service: Colorectal    OR COLONOSCOPY FLX DX W/COLLJ SPEC WHEN PFRMD N/A 10/4/2018    Procedure: COLONOSCOPY;  Surgeon: Tirso Juárez MD;  Location: [facility];  Service: Colorectal    TONSILLECTOMY       Family History   Problem Relation Age of Onset    Coronary artery disease Father     Crohn's disease Father     Stroke Father     Diabetes Family        Occupational History: Mentioned in HPI    Social History: As mentioned in HPI    Meds/Allergies   Current Facility-Administered Medications   Medication Dose Route Frequency    acetaminophen (TYLENOL) tablet 650 mg  650 mg Oral Q6H PRN    albuterol (PROVENTIL HFA,VENTOLIN HFA) inhaler 2 puff  2 puff Inhalation Q4H PRN    aspirin chewable tablet 81 mg  81 mg Oral Daily    atorvastatin (LIPITOR) tablet 40 mg  40 mg Oral Daily With Dinner    benzonatate (TESSALON PERLES) capsule 100 mg  100 mg Oral TID PRN    budesonide-formoterol (SYMBICORT) 160-4.5 mcg/act inhaler 2 puff  2 puff Inhalation BID    ceftriaxone (ROCEPHIN) 1 g/50 mL in dextrose IVPB  1,000 mg Intravenous Q24H     dextromethorphan-guaiFENesin (ROBITUSSIN DM) oral syrup 10 mL  10 mL Oral Q4H PRN    heparin (porcine) subcutaneous injection 5,000 Units  5,000 Units Subcutaneous Q8H JUVENTINO    ipratropium (ATROVENT) 0.02 % inhalation solution 0.5 mg  0.5 mg Nebulization TID    levalbuterol (XOPENEX) inhalation solution 1.25 mg  1.25 mg Nebulization TID    methylPREDNISolone sodium succinate (Solu-MEDROL) injection 40 mg  40 mg Intravenous Q8H    metoprolol succinate (TOPROL-XL) 24 hr tablet 25 mg  25 mg Oral HS    nicotine (NICODERM CQ) 14 mg/24hr TD 24 hr patch 1 patch  1 patch Transdermal Daily    pantoprazole (PROTONIX) EC tablet 40 mg  40 mg Oral Daily    sodium chloride 0.9 % infusion  75 mL/hr Intravenous Continuous    sulfaSALAzine (AZULFIDINE) tablet 500 mg  500 mg Oral 4x Daily       Medications Prior to Admission:     albuterol (2.5 mg/3 mL) 0.083 % nebulizer solution    albuterol (PROVENTIL HFA,VENTOLIN HFA) 90 mcg/act inhaler    aspirin ([employer] Aspirin Adult Low Dose) 81 mg chewable tablet    atorvastatin (LIPITOR) 40 mg tablet    Budeson-Glycopyrrol-Formoterol (Breztri Aerosphere) 160-9-4.8 MCG/ACT AERO    metoprolol succinate (TOPROL-XL) 25 mg 24 hr tablet    Multiple Vitamins-Minerals (Centrum Silver 50+Men) TABS    Nebulizers (AERONEB GO NEBULIZER HANDSET) MISC    nicotine (NICODERM CQ) 21 mg/24 hr TD 24 hr patch    pantoprazole (PROTONIX) 40 mg tablet    sulfaSALAzine (AZULFIDINE) 500 mg tablet  Allergies   Allergen Reactions    Medical Tape     Other      Brown cloth band aids       Latex Rash       Vitals: Blood pressure 117/66, pulse 84, temperature 97.7 °F (36.5 °C), resp. rate 20, SpO2 96%.,  There is no height or weight on file to calculate BMI.      Intake/Output Summary (Last 24 hours) at 9/3/2024 1137  Last data filed at 9/2/2024 1322  Gross per 24 hour   Intake 800 ml   Output --   Net 800 ml       Physical Exam  General:  Awake alert and oriented x 3, conversant without conversational dyspnea, NAD, normal  affect  HEENT:  PERRL, Sclera noninjected, nonicteric OU, Nares patent, no nasal flaring, no nasal drainage, Mucous membranes, moist, no oral lesions, normal dentition  NECK: Trachea midline, no accessory muscle use, no stridor, no cervical or supraclavicular adenopathy, JVP not elevated  CARDIAC: Reg, single s1/S2, no m/r/g  PULM: Breath sounds vesicular with expiratory wheezing auscultated anteriorly and posteriorly scattered throughout  CHEST: No gross deformities, equal chest expansion on inspiration bilaterally  ABD: Normoactive bowel sounds, soft nontender, nondistended, no rebound, no rigidity, no guarding  : Not examined  EXT: No cyanosis, no clubbing, no edema, normal capillary refill  SKIN:  No rashes, no lesions  NEURO: no focal neurologic deficits, AAOx3, moving all extremities appropriately    Labs: I have personally reviewed pertinent lab results.  Laboratory and Diagnostics  Results from last 7 days   Lab Units 09/03/24  0509 09/02/24  1005   WBC Thousand/uL 8.41 11.48*   HEMOGLOBIN g/dL 11.1* 13.7   HEMATOCRIT % 37.2 44.8   PLATELETS Thousands/uL 133* 163   SEGS PCT %  --  85*   BANDS PCT % 16*  --    MONO PCT % 0* 7   EOS PCT % 0 0     Results from last 7 days   Lab Units 09/03/24  0509 09/02/24  1031   SODIUM mmol/L 139 141   POTASSIUM mmol/L 4.3 4.5   CHLORIDE mmol/L 104 102   CO2 mmol/L 27 26   ANION GAP mmol/L 8 13   BUN mg/dL 22 19   CREATININE mg/dL 1.54* 1.96*   CALCIUM mg/dL 7.3* 8.5   GLUCOSE RANDOM mg/dL 188* 178*   ALT U/L  --  27   AST U/L  --  25   ALK PHOS U/L  --  95   ALBUMIN g/dL  --  3.8   TOTAL BILIRUBIN mg/dL  --  0.57     Results from last 7 days   Lab Units 09/03/24  0509   MAGNESIUM mg/dL 2.3      Results from last 7 days   Lab Units 09/02/24  1005   INR  1.01   PTT seconds 30          Results from last 7 days   Lab Units 09/02/24  1031   LACTIC ACID mmol/L 1.0                 Results from last 7 days   Lab Units 09/02/24  1005   D-DIMER QUANTITATIVE ug/ml FEU 4.14*      Results from last 7 days   Lab Units 09/03/24  0509 09/02/24  1031   PROCALCITONIN ng/ml 0.64* 1.12*       Imaging and other studies: I have personally reviewed pertinent reports.      EKG, Pathology, and Other Studies: I have personally reviewed pertinent reports.      Code Status: Level 1 - Full Code      Love Jackson MD  Pulmonary & Critical Care Fellow, PGY IV  [facility]

## 2024-09-03 NOTE — ASSESSMENT & PLAN NOTE
Presented with shortness of breath and diffuse wheezing initially requiring continuous BiPAP since transition to nasal cannula.  Reports ran out of medication approximately 3 days ago  Likely in setting of acute bronchitis as noted on CT scan and running out of his maintenance medication   At baseline on 4 L nasal cannula  With elevated procalcitonin ordered IV ceftriaxone  IV solu-medrol 40mg 3 times daily  Scheduled nebs  Wean oxygen as able -- currently on 5 L NC.  Baseline 4 L NC   Pt takes Breztri, reports he is struggling with this as medication doesn't always administer.  Will consult pulm to follow for IP management and medicaiton recs for discharge

## 2024-09-03 NOTE — UTILIZATION REVIEW
NOTIFICATION OF INPATIENT ADMISSION   AUTHORIZATION REQUEST   SERVICING FACILITY:   [facility]  Address: [address] [place] [address]  Tax ID: [id number]  NPI: [id number] ATTENDING PROVIDER:  Attending Name and NPI#: Pilo Perkins Md [[id number]]  Address: [address] [place] [address]  Phone: [phone]   ADMISSION INFORMATION:  Place of Service: Inpatient [facility]  Place of Service Code: 21  Inpatient Admission Date/Time: 9/2/24  3:08 PM  Discharge Date/Time: No discharge date for patient encounter.  Admitting Diagnosis Code/Description:  Shortness of breath [R06.02]  CHF (congestive heart failure) (HCC) [I50.9]  COPD with acute exacerbation (HCC) [J44.1]  Acute on chronic respiratory failure (HCC) [J96.20]     UTILIZATION REVIEW CONTACT:  Srinivasan Jimenez Utilization   Network Utilization Review Department  Phone: [phone]  Fax: [phone]  Email: [email or web address]  Contact for approvals/pending authorizations, clinical reviews, and discharge.     PHYSICIAN ADVISORY SERVICES:  Medical Necessity Denial & Wewa-dk-Pufq Review  Phone: [phone]  Fax: [phone]  Email: [email or web address]     DISCHARGE SUPPORT TEAM:  For Patients Discharge Needs & Updates  Phone: [phone] opt. 2 Fax: [phone]  Email: [email or web address]

## 2024-09-03 NOTE — ASSESSMENT & PLAN NOTE
Presented with WBC of 11K, noted to have a fever 100.9 degrees in Emergency Department  Procalcitonin elevated   Suspect likely secondary to bronchitis is noted on CT scan  IV ceftriaxone   Monitor WBC count & fever curve

## 2024-09-03 NOTE — CASE MANAGEMENT
Case Management Assessment & Discharge Planning Note    Patient name Mathew Bird  Location 2 213/CW2 213-01 MRN [id number]  : 1960 Date 9/3/2024       Current Admission Date: 2024  Current Admission Diagnosis:Acute exacerbation of chronic obstructive pulmonary disease (COPD) ([facility])   Patient Active Problem List    Diagnosis Date Noted Date Diagnosed    Acute on chronic respiratory failure ([facility]) 2024     Bronchitis 2024     COVID-19 virus infection 2024     Acute exacerbation of chronic obstructive pulmonary disease (COPD) ([facility]) 2024     VT (ventricular tachycardia) ([facility]) 2023     Bladder wall thickening 09/15/2023     Multiple pulmonary nodules 2023     Chronic respiratory failure with hypoxia ([facility]) 2023     End stage COPD ([facility]) 2023     Prediabetes 2023     Abnormal liver function 2023     Status post reversal of ileostomy 10/25/2018     Presence of drug-eluting stent in right coronary artery 2018     Paroxysmal atrial fibrillation ([facility]) 2018     DEBBY (acute kidney injury) ([facility]) 2018     Macrocytic anemia 2018     Moderate protein-calorie malnutrition ([facility]) 2018     Elevated troponin 05/15/2018     Alcohol abuse 05/15/2018     Cigarette nicotine dependence without complication 05/15/2018     Stage 4 very severe COPD by GOLD classification ([facility]) 2017     Spinal stenosis of cervical region 2016     Pericarditis 2016     Coronary artery disease involving native coronary artery of native heart without angina pectoris 2016     Crohn disease ([facility]) 10/22/2016     Atherosclerosis of coronary artery 2016     Dyslipidemia 10/19/2013       LOS (days): 1  Geometric Mean LOS (GMLOS) (days):   Days to GMLOS:     OBJECTIVE:    Risk of Unplanned Readmission Score: 16.38         Current admission status: Inpatient       Preferred Pharmacy:   [facility]/pharmacy #[id number] - [place], [place] - [facility] [address]  [address] [place] [id number]  Phone: [phone] Fax: [phone]    Primary Care Provider: Mariola Doyle MD    Primary Insurance: [employer]  Secondary Insurance:     ASSESSMENT:  Active Health Care Proxies    There are no active Health Care Proxies on file.       Advance Directives  Does patient have a Health Care POA?: Yes  Does patient have Advance Directives?: Yes  Advance Directives: Power of  for health care  Primary Contact: Amparo Adame -daughter         Readmission Root Cause  30 Day Readmission: No    Patient Information  Admitted from:: Home  Mental Status: Alert  During Assessment patient was accompanied by: Not accompanied during assessment  Assessment information provided by:: Patient  Primary Caregiver: Self  Support Systems: Self, Family members, Daughter (Grandson)  County of Residence: [place]  What city do you live in?: [place]  Home entry access options. Select all that apply.: No steps to enter home  Type of Current Residence: 2 story home  Upon entering residence, is there a bedroom on the main floor (no further steps)?: Yes  Upon entering residence, is there a bathroom on the main floor (no further steps)?: Yes  Living Arrangements: Lives w/ Daughter, Other (Comment) (lives with grandson)  Is patient a ?: No    Activities of Daily Living Prior to Admission  Functional Status: Independent  Completes ADLs independently?: Yes  Ambulates independently?: Yes  Does patient use assisted devices?: Yes  Assisted Devices (DME) used: Home Oxygen concentrator, Portable Oxygen concentrator  DME Company Name (respiratory supplies): [employer]  O2 Rate(s): 4Lpmh  Does patient currently own DME?: Yes  What DME does the patient currently own?: Portable Oxygen concentrator, Home Oxygen concentrator  Does patient have a history of Outpatient Therapy (PT/OT)?: Yes  Does the patient have a history of Short-Term Rehab?: No  Does patient have a history of HHC?: Yes  Does patient  currently have [facility]?: No         Patient Information Continued  Income Source: Employed  Does patient have prescription coverage?: Yes  Does patient receive dialysis treatments?: No  Does patient have a history of substance abuse?: Yes  Historical substance use preference: Alcohol/ETOH  Is patient currently in treatment for substance abuse?: N/A - sober (patient sober for many years)  Does patient have a history of Mental Health Diagnosis?: No         Means of Transportation  Means of Transport to Appts:: Drives Self      Social Determinants of Health (SDOH)      Flowsheet Row Most Recent Value   Housing Stability    In the last 12 months, was there a time when you were not able to pay the mortgage or rent on time? N   In the past 12 months, how many times have you moved where you were living? 0   At any time in the past 12 months, were you homeless or living in a shelter (including now)? N   Transportation Needs    In the past 12 months, has lack of transportation kept you from medical appointments or from getting medications? no   In the past 12 months, has lack of transportation kept you from meetings, work, or from getting things needed for daily living? No   Food Insecurity    Within the past 12 months, you worried that your food would run out before you got the money to buy more. Never true   Within the past 12 months, the food you bought just didn't last and you didn't have money to get more. Never true   Utilities    In the past 12 months has the electric, gas, oil, or water company threatened to shut off services in your home? No            DISCHARGE DETAILS:    Discharge planning discussed with:: patient  Freedom of Choice: Yes  Comments - Freedom of Choice: pending any recs  CM contacted family/caregiver?: No- see comments  Were Treatment Team discharge recommendations reviewed with patient/caregiver?: Yes  Did patient/caregiver verbalize understanding of patient care needs?: Yes  Were patient/caregiver  advised of the risks associated with not following Treatment Team discharge recommendations?: Yes    Contacts  Patient Contacts: Amparo Adame, daughter  Relationship to Patient:: Family  Contact Method: Phone  Phone Number: [phone] (H)  Reason/Outcome: Continuity of Care, Emergency Contact, Discharge Planning  DME Referral Provided  Referral made for DME?: No     Patient lives with daughter and grandson with no steps to enter the two story home.  Patient states he lives on the 1st level of the home.  Owns a cane, walker and rollator.  Is independent with ambulation and adl's.  Patient has home oxygen, 4L at baseline, uses [employer] for supplies., [employer] for portable.  CM to follow with discharge needs.  ...CM reviewed d/c planning process including the following: identifying help at home, patient preference for d/c planning needs, Discharge Lounge, [facility] Meds to Bed program, availability of treatment team to discuss questions or concerns patient and/or family may have regarding understanding medications and recognizing signs and symptoms once discharged.  CM also encouraged patient to follow up with all recommended appointments after discharge. Patient advised of importance for patient and family to participate in managing patient’s medical well being.                                                       Additional Comments: Patient conveyed that he has no issues receiving his inhaler medications, Breztri for his three month supply.  Issue is that he's having difficulty getting his inhaler to work properly which causes him to pump the inhaler serveral times ultimately causing him to run short on his doses needed to alleviate his symptoms. [employer] on duty made aware of same and will have additional teaching provided to patient.

## 2024-09-03 NOTE — TREATMENT PLAN
Noted 1/2 blood cultures [facility], staph coag negative.  This likely represents contaminant.  Will follow culture results.

## 2024-09-04 VITALS
TEMPERATURE: 97.8 F | SYSTOLIC BLOOD PRESSURE: 108 MMHG | OXYGEN SATURATION: 97 % | RESPIRATION RATE: 16 BRPM | DIASTOLIC BLOOD PRESSURE: 65 MMHG | HEART RATE: 99 BPM

## 2024-09-04 PROBLEM — R78.81 POSITIVE BLOOD CULTURE: Status: ACTIVE | Noted: 2024-09-04

## 2024-09-04 LAB
ANION GAP SERPL CALCULATED.3IONS-SCNC: 7 MMOL/L (ref 4–13)
BASOPHILS # BLD AUTO: 0.01 THOUSANDS/ÂΜL (ref 0–0.1)
BASOPHILS NFR BLD AUTO: 0 % (ref 0–1)
BUN SERPL-MCNC: 24 MG/DL (ref 5–25)
CALCIUM SERPL-MCNC: 7.4 MG/DL (ref 8.4–10.2)
CHLORIDE SERPL-SCNC: 107 MMOL/L (ref 96–108)
CO2 SERPL-SCNC: 27 MMOL/L (ref 21–32)
CREAT SERPL-MCNC: 1.34 MG/DL (ref 0.6–1.3)
EOSINOPHIL # BLD AUTO: 0 THOUSAND/ÂΜL (ref 0–0.61)
EOSINOPHIL NFR BLD AUTO: 0 % (ref 0–6)
ERYTHROCYTE [DISTWIDTH] IN BLOOD BY AUTOMATED COUNT: 20.7 % (ref 11.6–15.1)
EST. AVERAGE GLUCOSE BLD GHB EST-MCNC: 166 MG/DL
GFR SERPL CREATININE-BSD FRML MDRD: 56 ML/MIN/1.73SQ M
GLUCOSE SERPL-MCNC: 225 MG/DL (ref 65–140)
HBA1C MFR BLD: 7.4 %
HCT VFR BLD AUTO: 34.4 % (ref 36.5–49.3)
HGB BLD-MCNC: 10.2 G/DL (ref 12–17)
IMM GRANULOCYTES # BLD AUTO: 0.06 THOUSAND/UL (ref 0–0.2)
IMM GRANULOCYTES NFR BLD AUTO: 1 % (ref 0–2)
LYMPHOCYTES # BLD AUTO: 0.41 THOUSANDS/ÂΜL (ref 0.6–4.47)
LYMPHOCYTES NFR BLD AUTO: 4 % (ref 14–44)
MCH RBC QN AUTO: 24.4 PG (ref 26.8–34.3)
MCHC RBC AUTO-ENTMCNC: 29.7 G/DL (ref 31.4–37.4)
MCV RBC AUTO: 82 FL (ref 82–98)
MONOCYTES # BLD AUTO: 0.74 THOUSAND/ÂΜL (ref 0.17–1.22)
MONOCYTES NFR BLD AUTO: 7 % (ref 4–12)
NEUTROPHILS # BLD AUTO: 8.81 THOUSANDS/ÂΜL (ref 1.85–7.62)
NEUTS SEG NFR BLD AUTO: 88 % (ref 43–75)
NRBC BLD AUTO-RTO: 0 /100 WBCS
PLATELET # BLD AUTO: 135 THOUSANDS/UL (ref 149–390)
PMV BLD AUTO: 10.7 FL (ref 8.9–12.7)
POTASSIUM SERPL-SCNC: 4.5 MMOL/L (ref 3.5–5.3)
PROCALCITONIN SERPL-MCNC: 0.41 NG/ML
RBC # BLD AUTO: 4.18 MILLION/UL (ref 3.88–5.62)
SODIUM SERPL-SCNC: 141 MMOL/L (ref 135–147)
WBC # BLD AUTO: 10.03 THOUSAND/UL (ref 4.31–10.16)

## 2024-09-04 PROCEDURE — 99239 HOSP IP/OBS DSCHRG MGMT >30: CPT | Performed by: PHYSICIAN ASSISTANT

## 2024-09-04 PROCEDURE — 84145 PROCALCITONIN (PCT): CPT | Performed by: PHYSICIAN ASSISTANT

## 2024-09-04 PROCEDURE — 97167 OT EVAL HIGH COMPLEX 60 MIN: CPT

## 2024-09-04 PROCEDURE — 97163 PT EVAL HIGH COMPLEX 45 MIN: CPT

## 2024-09-04 PROCEDURE — 94640 AIRWAY INHALATION TREATMENT: CPT

## 2024-09-04 PROCEDURE — 94760 N-INVAS EAR/PLS OXIMETRY 1: CPT

## 2024-09-04 PROCEDURE — 85025 COMPLETE CBC W/AUTO DIFF WBC: CPT | Performed by: PHYSICIAN ASSISTANT

## 2024-09-04 PROCEDURE — 99232 SBSQ HOSP IP/OBS MODERATE 35: CPT | Performed by: INTERNAL MEDICINE

## 2024-09-04 PROCEDURE — 94664 DEMO&/EVAL PT USE INHALER: CPT

## 2024-09-04 PROCEDURE — 80048 BASIC METABOLIC PNL TOTAL CA: CPT | Performed by: PHYSICIAN ASSISTANT

## 2024-09-04 RX ORDER — PREDNISONE 20 MG/1
40 TABLET ORAL DAILY
Status: DISCONTINUED | OUTPATIENT
Start: 2024-09-04 | End: 2024-09-04 | Stop reason: HOSPADM

## 2024-09-04 RX ORDER — FLUTICASONE FUROATE, UMECLIDINIUM BROMIDE AND VILANTEROL TRIFENATATE 100; 62.5; 25 UG/1; UG/1; UG/1
1 POWDER RESPIRATORY (INHALATION) DAILY
Qty: 60 BLISTER | Refills: 0 | Status: SHIPPED | OUTPATIENT
Start: 2024-09-04 | End: 2024-10-04

## 2024-09-04 RX ORDER — ALBUTEROL SULFATE 90 UG/1
2 AEROSOL, METERED RESPIRATORY (INHALATION) EVERY 4 HOURS PRN
Qty: 18 G | Refills: 0 | Status: SHIPPED | OUTPATIENT
Start: 2024-09-04

## 2024-09-04 RX ORDER — AZITHROMYCIN 250 MG/1
250 TABLET, FILM COATED ORAL EVERY 24 HOURS
Qty: 3 TABLET | Refills: 0 | Status: SHIPPED | OUTPATIENT
Start: 2024-09-04 | End: 2024-09-07

## 2024-09-04 RX ORDER — PREDNISONE 10 MG/1
TABLET ORAL
Qty: 30 TABLET | Refills: 0 | Status: SHIPPED | OUTPATIENT
Start: 2024-09-04 | End: 2024-09-16

## 2024-09-04 RX ORDER — BUDESONIDE, GLYCOPYRROLATE, AND FORMOTEROL FUMARATE 160; 9; 4.8 UG/1; UG/1; UG/1
2 AEROSOL, METERED RESPIRATORY (INHALATION) 2 TIMES DAILY
Qty: 32.1 G | Refills: 0 | Status: SHIPPED | OUTPATIENT
Start: 2024-09-04

## 2024-09-04 RX ADMIN — BUDESONIDE AND FORMOTEROL FUMARATE DIHYDRATE 2 PUFF: 160; 4.5 AEROSOL RESPIRATORY (INHALATION) at 09:36

## 2024-09-04 RX ADMIN — LEVALBUTEROL HYDROCHLORIDE 1.25 MG: 1.25 SOLUTION RESPIRATORY (INHALATION) at 14:37

## 2024-09-04 RX ADMIN — HEPARIN SODIUM 5000 UNITS: 5000 INJECTION INTRAVENOUS; SUBCUTANEOUS at 06:45

## 2024-09-04 RX ADMIN — LEVALBUTEROL HYDROCHLORIDE 1.25 MG: 1.25 SOLUTION RESPIRATORY (INHALATION) at 07:11

## 2024-09-04 RX ADMIN — PANTOPRAZOLE SODIUM 40 MG: 40 TABLET, DELAYED RELEASE ORAL at 09:35

## 2024-09-04 RX ADMIN — ASPIRIN 81 MG CHEWABLE TABLET 81 MG: 81 TABLET CHEWABLE at 09:34

## 2024-09-04 RX ADMIN — SULFASALAZINE 500 MG: 500 TABLET ORAL at 12:17

## 2024-09-04 RX ADMIN — UMECLIDINIUM 1 PUFF: 62.5 AEROSOL, POWDER ORAL at 09:36

## 2024-09-04 RX ADMIN — NICOTINE 1 PATCH: 14 PATCH, EXTENDED RELEASE TRANSDERMAL at 09:35

## 2024-09-04 RX ADMIN — IPRATROPIUM BROMIDE 0.5 MG: 0.5 SOLUTION RESPIRATORY (INHALATION) at 14:37

## 2024-09-04 RX ADMIN — SULFASALAZINE 500 MG: 500 TABLET ORAL at 09:36

## 2024-09-04 RX ADMIN — IPRATROPIUM BROMIDE 0.5 MG: 0.5 SOLUTION RESPIRATORY (INHALATION) at 07:11

## 2024-09-04 RX ADMIN — METHYLPREDNISOLONE SODIUM SUCCINATE 40 MG: 40 INJECTION, POWDER, FOR SOLUTION INTRAMUSCULAR; INTRAVENOUS at 09:35

## 2024-09-04 NOTE — UTILIZATION REVIEW
Continued Stay Review    Date: 4/29/23                          Current Patient Class: inpatient  Current Level of Care: transferred from ICU to med surg today    HPI:62 y o  male initially admitted on 4/25/23     Assessment/Plan:  Pt extubated on 4/28, on vEEG for 24 hrs with no seizure activity detected  Pt has been off pressors and is hemodynamically stable and appropriate for transfer to stepdown unit 2 today  Continue to monitor mental status, consider neuro consult if no improvement  Continue thiamine and folate, if no improvement consider IV high dose thiamine  Continue CIWA, monitor for s/s of WD, per toxicology increase phenobarb to max of 2 to 2 5g  Pt transitioned to po prednisone on 4/28, continue to complete 5 day course then dc  Monitor O2 sat, continue atrovent, performist, and pulmicort and albuterol prn  Monitor daily CMP, INR, monitor liver function  Continue accuchecks w/ SSI  Received albumin and one time dose lasix today dt DEBBY,  monitor I/O and repeat BMP  Monitor CBC and transfuse hgb <7  Continue ASA and statin  Monitor Crohn's disease      Vital Signs:   Date/Time Temp Pulse Resp BP MAP (mmHg) SpO2 FiO2 (%) Calculated FIO2 (%) - Nasal Cannula Nasal Cannula O2 Flow Rate (L/min) O2 Device O2 Interface Device Patient Position - Orthostatic VS   05/01/23 1057 -- -- -- -- -- 91 % -- 60 10 L/min Mid flow nasal cannula -- --   05/01/23 10:50:48 -- 119 Abnormal  -- 97/64 75 90 % -- -- -- -- -- --   05/01/23 10:48:41 -- 119 Abnormal  -- 87/65 Abnormal  72 83 % Abnormal  -- -- -- -- -- --   05/01/23 08:38:30 99 7 °F (37 6 °C) 114 Abnormal  22 95/67 76 92 % -- -- -- -- -- --   05/01/23 0738 -- -- -- -- -- 93 % -- -- -- -- Face mask --   05/01/23 06:06:18 -- 112 Abnormal  22 100/68 79 92 % -- -- -- -- -- --   05/01/23 0416 -- -- -- -- -- 94 % -- -- -- -- Face mask --   05/01/23 0303 -- -- -- -- -- -- -- -- -- BiPAP -- --   05/01/23 0257 -- -- -- -- -- 93 % -- -- -- -- -- --   05/01/23 0248 98 1 °F (36 7 °C) 110 Abnormal  22 114/69 84 86 % Abnormal  -- -- -- -- -- --   04/30/23 2232 -- -- -- -- -- 97 % -- -- -- -- Face mask --   04/30/23 22:17:46 97 8 °F (36 6 °C) 118 Abnormal  24 Abnormal  111/74 86 95 % -- -- -- -- -- --   04/30/23 2215 -- -- -- -- -- -- -- -- -- BiPAP  -- --   O2 Device: applied by respiratory at 04/30/23 2215 04/30/23 1938 -- -- -- -- -- -- -- 44 6 L/min Nasal cannula -- --   04/30/23 1929 97 8 °F (36 6 °C) 102 19 -- -- 96 % -- -- -- -- -- --   04/30/23 19:18:58 -- 104 -- 108/65 79 99 % -- -- -- -- -- --   04/30/23 1901 -- -- -- -- -- 98 % -- -- -- -- -- --   04/30/23 18:14:06 97 8 °F (36 6 °C) 107 Abnormal  21 99/66 77 97 % -- -- -- -- -- --   04/30/23 1710 -- -- -- -- -- -- -- 44 6 L/min Nasal cannula -- --   04/30/23 1650 97 8 °F (36 6 °C) 106 Abnormal  22 103/66 77 92 % -- -- -- -- -- --   04/30/23 1638 -- -- -- -- -- -- -- 36 4 L/min Nasal cannula -- --   04/30/23 1550 -- 96 20 84/57 Abnormal  66 97 % -- -- -- -- -- --   04/30/23 1500 -- 106 Abnormal  25 Abnormal  113/65 75 96 % -- -- -- -- -- --   04/30/23 1440 -- 98 22 76/48 Abnormal  56 Abnormal  97 % -- -- -- -- -- --   04/30/23 1424 -- 100 24 Abnormal  100/54 69 95 % -- -- -- -- -- --   04/30/23 1422 -- 100 24 Abnormal  81/50 Abnormal  56 Abnormal  94 % -- -- -- -- -- Lying   04/30/23 1340 98 1 °F (36 7 °C) 106 Abnormal  25 Abnormal  84/53 Abnormal  62 Abnormal  93 % -- -- -- -- -- --   04/30/23 1300 -- -- -- -- -- -- -- 44 6 L/min Nasal cannula -- --   04/30/23 1240 -- 116 Abnormal  23 Abnormal  85/55 Abnormal  68 91 % -- -- -- -- -- Lying   04/30/23 1235 -- 116 Abnormal  29 Abnormal  -- -- 91 % -- 44 6 L/min Nasal cannula -- --   04/30/23 1110 -- 112 Abnormal  27 Abnormal  97/63 72 96 % -- -- -- -- -- --   04/30/23 1040 -- 118 Abnormal  26 Abnormal  92/60 65 93 % -- -- -- -- -- --   04/30/23 1000 -- 116 Abnormal  27 Abnormal  104/61 76 94 % -- -- -- -- -- --   04/30/23 0950 -- 114 Abnormal  24 Abnormal  111/61 78 95 % -- -- -- -- -- --   04/30/23 0900 -- 112 Abnormal  24 Abnormal  96/64 71 98 % -- -- -- -- -- --   04/30/23 0828 98 1 °F (36 7 °C) -- -- -- -- -- -- -- -- -- -- --   04/30/23 0810 -- 116 Abnormal  27 Abnormal  92/64 74 86 % Abnormal  -- -- -- -- -- --   04/30/23 0756 -- 116 Abnormal  28 Abnormal  -- -- 88 % Abnormal  60 -- -- BiPAP  -- --   O2 Device: Rate 22 Bipap 16/8 at 04/30/23 0756   04/30/23 0710 -- -- -- -- -- 91 % -- -- -- -- -- --   04/30/23 0701 -- 116 Abnormal  23 Abnormal  -- -- 97 % -- -- -- -- Face mask --   04/30/23 0700 -- 114 Abnormal  22 113/65 76 98 % -- -- -- -- -- --   04/30/23 0619 -- 114 Abnormal  18 -- -- 100 % -- -- -- BiPAP Face mask --   04/30/23 0600 -- 122 Abnormal  24 Abnormal  106/62 77 85 % Abnormal  -- 44 6 L/min -- -- --   04/30/23 0500 -- 122 Abnormal  18 97/61 73 96 % -- -- -- -- -- --   04/30/23 0400 99 °F (37 2 °C) 122 Abnormal  22 99/55 63 Abnormal  95 % -- 36 4 L/min Nasal cannula -- --   04/30/23 0200 -- 120 Abnormal  22 103/46 Abnormal  62 Abnormal  90 % -- -- -- -- -- --   04/30/23 0000 -- 124 Abnormal  23 Abnormal  116/56 76 91 % -- 36 4 L/min Nasal cannula -- --   04/29/23 2200 -- 110 Abnormal  25 Abnormal  115/55 78 95 % -- -- -- -- -- --   04/29/23 2000 98 2 °F (36 8 °C) 106 Abnormal  16 117/61 80 88 % Abnormal  -- 28 2 L/min Nasal cannula -- --   04/29/23 1918 -- 100 16 138/65 81 100 % -- -- -- -- -- --   04/29/23 1912 -- -- -- -- -- 92 % -- 28 2 L/min Nasal cannula -- --   04/29/23 1817 -- 110 Abnormal  21 -- -- 96 % -- 28 2 L/min Nasal cannula -- --   04/29/23 1810 -- 108 Abnormal  23 Abnormal  128/60 94 94 % -- -- -- -- -- --   04/29/23 1748 -- 108 Abnormal  -- 124/56 -- -- -- -- -- -- -- --   04/29/23 1730 -- 110 Abnormal  25 Abnormal  124/56 71 93 % -- -- -- -- -- --   04/29/23 1720 -- 112 Abnormal  25 Abnormal  -- -- 86 % Abnormal  -- 32 3 L/min Nasal cannula -- --   04/29/23 1700 -- 114 Abnormal  26 Abnormal  121/63 77 93 % -- -- -- -- -- --   04/29/23 1630 -- 104 17 105/56 63 Abnormal  92 % -- -- -- -- -- --   04/29/23 1600 98 2 °F (36 8 °C) 102 17 107/58 74 92 % -- -- -- -- -- --   04/29/23 1543 -- 104 15 108/55 75 91 % -- -- -- -- -- --   04/29/23 1531 -- 104 16 -- -- 92 % -- -- -- None (Room air) -- --   04/29/23 1500 -- 102 15 115/54 70 90 % -- -- -- -- -- --   04/29/23 1445 -- 98 15 -- -- 99 % -- 32 3 L/min Nasal cannula -- --   04/29/23 1430 -- 108 Abnormal  20 104/50 70 98 % -- -- -- -- -- --   04/29/23 1410 -- 98 34 Abnormal  106/56 69 99 % -- -- -- -- -- --   04/29/23 1356 -- 104 31 Abnormal  97/70 75 96 % -- -- -- -- -- --   04/29/23 1350 -- 102 24 Abnormal  76/53 Abnormal  62 Abnormal  95 % -- -- -- -- -- --   04/29/23 1323 98 °F (36 7 °C) -- -- -- -- -- -- -- -- -- -- --   04/29/23 1320 -- 120 Abnormal  29 Abnormal  99/67 74 88 % Abnormal  -- -- -- -- -- --   04/29/23 1250 -- 100 27 Abnormal  94/55 63 Abnormal  92 % -- -- -- -- -- --   04/29/23 1220 -- 104 21 102/63 79 93 % -- -- -- -- -- --   04/29/23 1120 -- 98 19 115/56 73 98 % -- -- -- -- -- --   04/29/23 1050 -- 102 29 Abnormal  90/56 64 Abnormal  91 % -- -- -- -- -- --   04/29/23 1039 -- -- -- -- -- -- -- 44 6 L/min Nasal cannula -- --   04/29/23 1030 -- 102 38 Abnormal  86/56 Abnormal  62 Abnormal  91 % -- -- -- -- -- --   04/29/23 1020 -- 100 23 Abnormal  94/56 70 91 % -- -- -- -- -- --   04/29/23 1010 -- 100 24 Abnormal  81/55 Abnormal  61 Abnormal  92 % -- -- -- -- -- --   04/29/23 0940 -- 100 24 Abnormal  93/58 68 92 % -- -- -- -- -- --   04/29/23 0920 -- 116 Abnormal  41 Abnormal  101/48 Abnormal  56 Abnormal  92 % -- -- -- -- -- --   04/29/23 0900 -- 96 30 Abnormal  76/51 Abnormal  56 Abnormal  85 % Abnormal  -- -- -- -- -- --   04/29/23 0820 -- 88 24 Abnormal  74/45 Abnormal  60 Abnormal  93 % -- -- -- -- -- --   04/29/23 0800 -- 88 28 Abnormal  75/46 Abnormal  54 Abnormal  93 % -- -- -- -- -- --   04/29/23 0750 -- 82 19 75/44 Abnormal  52 Abnormal  95 % -- -- -- -- -- --   04/29/23 0745 99 3 °F (37 4 °C) 82 20 64/42 Abnormal  49 Abnormal  97 % -- -- -- -- -- --   04/29/23 0722 -- -- -- -- -- -- -- 36 4 L/min Nasal cannula -- --   04/29/23 0600 98 6 °F (37 °C) 82 12 110/75 86 96 % -- -- -- -- -- --   04/29/23 0545 98 6 °F (37 °C) 84 18 110/75 86 95 % -- -- -- -- -- --   04/29/23 0400 97 9 °F (36 6 °C) 86 19 104/66 93 94 % -- 36 4 L/min Nasal cannula -- --   04/29/23 0200 97 2 °F (36 2 °C) Abnormal  78 12 104/67 74 95 % -- -- -- -- -- --   04/29/23 0100 97 2 °F (36 2 °C) Abnormal  92 22 101/71 80 92 % -- -- -- -- -- --   04/29/23 0015 96 8 °F (36 °C) Abnormal  82 10 Abnormal  111/71 82 94 % -- -- -- -- -- --   04/29/23 0000 97 °F (36 1 °C) Abnormal   92 19 118/77 89 95 % -- 36 4 L/min Nasal cannula -- --   Temp: pt covered with blankets and room temp turned up at 04/29/23 0000     CIWA-Ar Score  Row Name 05/01/23 10:50:48 05/01/23 10:48:41 05/01/23 08:38:30 05/01/23 06:06:18 05/01/23 0248   CIWA-Ar   BP 97/64  -DI 87/65 Abnormal   -DI 95/67  -/68  -/69  -LB   Pulse 119 Abnormal   - Abnormal   - Abnormal   - Abnormal   - Abnormal   -LB   Nausea and Vomiting 0  -ML -- -- -- 0  -LB   Tactile Disturbances 0  -ML -- -- -- 0  -LB   Tremor 2  -ML -- -- -- 3  -LB   Auditory Disturbances 0  -ML -- -- -- 0  -LB   Paroxysmal Sweats 0  -ML -- -- -- 0  -LB   Visual Disturbances 0  -ML -- -- -- 0  -LB   Anxiety 1  -ML -- -- -- 1  -LB   Headache, Fullness in Head 0  -ML -- -- -- 0  -LB   Agitation 1  -ML -- -- -- 1  -LB   Orientation and Clouding of Sensorium 3  -ML -- -- -- 3  -LB   CIWA-Ar Total 7  -ML -- -- -- 8  -LB   Row Name 04/30/23 22:17:46 04/30/23 1929 04/30/23 19:18:58 04/30/23 18:14:06 04/30/23 1650   CIWA-Ar   /74  -DI -- 108/65  -DI 99/66  -/66  -IR   Pulse 118 Abnormal   -  -  - Abnormal   - Abnormal   -IR   Nausea and Vomiting 0  -LB 0  -LB -- -- --   Tactile Disturbances 0  -LB 0  -LB -- -- --   Tremor 2  -LB 1  -LB -- -- --   Auditory Disturbances 0  -LB 0  -LB -- -- --   Paroxysmal Sweats 0  -LB 0  -LB -- -- --   Visual Disturbances 0  -LB 0  -LB -- -- --   Anxiety 1  -LB 0  -LB -- -- --   Headache, Fullness in Head 0  -LB 0  -LB -- -- --   Agitation 0  -LB 0  -LB -- -- --   Orientation and Clouding of Sensorium 3  -LB 3  -LB -- -- --   CIWA-Ar Total 6  -LB 4  -LB -- -- --   Row Name 04/30/23 1550 04/30/23 1500 04/30/23 1440 04/30/23 1424 04/30/23 1422   CIWA-Ar   BP 84/57 Abnormal   -/65  -IR 76/48 Abnormal   -/54  -IR 81/50 Abnormal   -IR   Pulse 96  - Abnormal   -IR 98  -  -  -IR   Nausea and Vomiting -- 0  -IR -- -- --   Tactile Disturbances -- 0  -IR -- -- --   Tremor -- 0  -IR -- -- --   Auditory Disturbances -- 0  -IR -- -- --   Paroxysmal Sweats -- 0  -IR -- -- --   Visual Disturbances -- 0  -IR -- -- --   Anxiety -- 0  -IR -- -- --   Headache, Fullness in Head -- 0  -IR -- -- --   Agitation -- 0  -IR -- -- --   Orientation and Clouding of Sensorium -- 3  -IR -- -- --   CIWA-Ar Total -- 3  -IR -- -- --   Row Name 04/30/23 1340 04/30/23 1240 04/30/23 1235 04/30/23 1110 04/30/23 1040   CIWA-Ar   BP 84/53 Abnormal   -IR 85/55 Abnormal   -IR -- 97/63  -IR 92/60  -IR   Pulse 106 Abnormal   - Abnormal   - Abnormal   - Abnormal   - Abnormal   -IR   Row Name 04/30/23 1000 04/30/23 0950 04/30/23 0900 04/30/23 0810 04/30/23 0756   CIWA-Ar   /61  -/61  -IR 96/64  -IR 92/64  -IR --   Pulse 116 Abnormal   - Abnormal   - Abnormal   - Abnormal   - Abnormal   -IR   Nausea and Vomiting 0  -IR -- -- -- --   Tactile Disturbances 0  -IR -- -- -- --   Tremor 0  -IR -- -- -- --   Auditory Disturbances 0  -IR -- -- -- --   Paroxysmal Sweats 0  -IR -- -- -- --   Visual Disturbances 0  -IR -- -- -- --   Anxiety 0  -IR -- -- -- --   Headache, Fullness in Head 0  -IR -- -- -- --   Agitation 2  -IR -- -- -- --   Orientation and Clouding of Sensorium 3  -IR -- -- -- --   CIWA-Ar Total 5  -IR -- -- -- --   Row Name 04/30/23 0701 04/30/23 0700 04/30/23 0619 04/30/23 0600 04/30/23 0500   [facility]   BP -- 113/65  -IR -- 106/62  -AW 97/61  -AW   Pulse 116 Abnormal   - Abnormal   - Abnormal   - Abnormal   - Abnormal   -AW   Row Name 04/30/23 0400 04/30/23 0200 04/30/23 0000 04/29/23 2200 04/29/23 2000   [facility]   BP 99/55  -/46 Abnormal   -/56  -/55  -/61  -AW   Pulse 122 Abnormal   - Abnormal   - Abnormal   - Abnormal   - Abnormal   -AW   Nausea and Vomiting -- -- -- -- 0  -AW   Tactile Disturbances -- -- -- -- 4  -AW   Tremor -- -- -- -- 0  -AW   Auditory Disturbances -- -- -- -- 0  -AW   Paroxysmal Sweats -- -- -- -- 0  -AW   Visual Disturbances -- -- -- -- 3  -AW   Anxiety -- -- -- -- 1  -AW   Headache, Fullness in Head -- -- -- -- 0  -AW   Agitation -- -- -- -- 5  -AW   Orientation and Clouding of Sensorium -- -- -- -- 3  contacted resident  -AW   CIWA-Ar Total -- -- -- -- 16  -AW   Row Name 04/29/23 1918 04/29/23 1817 04/29/23 1810 04/29/23 1748 04/29/23 1730   [facility]   /65  -IR -- 128/60  -/56  -/56  -IR   Pulse 100  - Abnormal   - Abnormal   - Abnormal   - Abnormal   -IR   Nausea and Vomiting -- -- -- 0  -IR --   Tactile Disturbances -- -- -- 0  -IR --   Tremor -- -- -- 0  -IR --   Auditory Disturbances -- -- -- 0  -IR --   Paroxysmal Sweats -- -- -- 0  -IR --   Visual Disturbances -- -- -- 0  -IR --   Anxiety -- -- -- 0  -IR --   Headache, Fullness in Head -- -- -- 0  -IR --   Agitation -- -- -- 1  -IR --   Orientation and Clouding of Sensorium -- -- -- 3  -IR --   CIWA-Ar Total -- -- -- 4  -IR --   Row Name 04/29/23 1720 04/29/23 1700 04/29/23 1630 04/29/23 1600 04/29/23 1543   CIWA-Ar   BP -- 121/63  -/56  -/58  -/55  -IR   Pulse 112 Abnormal   - Abnormal   -  -  -  -IR   Row Name 04/29/23 1531 04/29/23 1523 04/29/23 1500 04/29/23 1445 04/29/23 1430   CIWA-Ar   BP -- -- 115/54  -IR -- 104/50  -IR   Pulse 104  -  -  -IR 98  - Abnormal   -IR   Row Name 04/29/23 1410 04/29/23 1356 04/29/23 1350 04/29/23 1320 04/29/23 1250   CIWA-Ar   /56  -IR 97/70  -IR 76/53 Abnormal   -IR 99/67  -IR 94/55  -IR   Pulse 98  -  -  - Abnormal   -  -IR   Row Name 04/29/23 1220 04/29/23 1120 04/29/23 1050 04/29/23 1030 04/29/23 1020   CIWA-Ar   /63  -/56  -IR 90/56  -IR 86/56 Abnormal   -IR 94/56  -IR   Pulse 104  -IR 98  -  -  -  -IR   Row Name 04/29/23 1010 04/29/23 0940 04/29/23 0920 04/29/23 0900 04/29/23 0820   CIWA-Ar   BP 81/55 Abnormal   -IR 93/58  -/48 Abnormal   -IR 76/51 Abnormal   -IR 74/45 Abnormal   -IR   Pulse 100  -  - Abnormal   -IR 96  -IR 88  -IR   Row Name 04/29/23 0800 04/29/23 0750 04/29/23 0745 04/29/23 0600 04/29/23 0545   CIWA-Ar   BP 75/46 Abnormal   -IR 75/44 Abnormal   -IR 64/42 Abnormal   -/75  -/75  -AW   Pulse 88  -IR 82  -IR 82  -IR 82  -AW 84  -AW   Row Name 04/29/23 0400 04/29/23 0200 04/29/23 0100 04/29/23 0015 04/29/23 0000   CIWA-Ar   /66  -/67  -/71  -/71  -/77  -AW   Pulse 86  -AW 78  -AW 92  -AW 82  -AW 92  -AW   Row Name 04/28/23 2200 04/28/23 2000 04/28/23 1852 04/28/23 1845 04/28/23 1840   CIWA-Ar   /76  -/60  -AW 86/59 Abnormal   -IR 82/58 Abnormal   -IR 96/64  -CP   Pulse 92  -AW 94  -AW 92  -IR 96  -  -CP   Row Name 04/28/23 1800 04/28/23 1700 04/28/23 1642 04/28/23 1600 04/28/23 1545   CIWA-Ar   BP 96/60  -CP 88/57 Abnormal   -CP 79/55 Abnormal   -CP 98/63  -IR 85/56 Abnormal   -IR   Pulse 104  -CP 94  -  - Abnormal   - Abnormal   -IR   Row Name 04/28/23 1530 04/28/23 1515 04/28/23 1513 04/28/23 1500 04/28/23 1430   CIWA-Ar   BP 82/56 Abnormal   -/65  -IR 84/56 Abnormal   -IR 90/56  -IR 96/65  -IR   Pulse 116 Abnormal   - Abnormal   -IR 114 Abnormal   - Abnormal   - Abnormal   -IR   Row Name 04/28/23 1416 04/28/23 1400 04/28/23 1345 04/28/23 1330 04/28/23 1315   CIWA-Ar   /77  -IR 77/48 Abnormal   -CP 79/54 Abnormal   -/77  -IR 91/63  -IR   Pulse 120 Abnormal   - Abnormal   - Abnormal   - Abnormal   - Abnormal   -IR   Row Name 04/28/23 1300 04/28/23 1245 04/28/23 1230 04/28/23 1215 04/28/23 1200   CIWA-Ar   /76  -/72  -/70  -/63  -IR 89/57 Abnormal   -CP   Pulse 124 Abnormal   - Abnormal   - Abnormal   - Abnormal   - Abnormal   -CP   Row Name 04/28/23 1100 04/28/23 1054 04/28/23 1053 04/28/23 1052 04/28/23 1000   CIWA-Ar   /69  -IR -- -- -- 115/73  -IR   Pulse 120 Abnormal   - Abnormal   - Abnormal   - Abnormal   - Abnormal   -IR   Row Name 04/28/23 0930 04/28/23 0915 04/28/23 0900 04/28/23 0845 04/28/23 0830   CIWA-Ar   /67  -/67  -/61  -/76  -/70  -IR   Pulse 118 Abnormal   - Abnormal   - Abnormal   - Abnormal   -  -IR   Row Name 04/28/23 0815 04/28/23 0800 04/28/23 0745 04/28/23 0735 04/28/23 0715   CIWA-Ar   /71  -/73  -CP 98/63  -/66  -CP 99/67  -IR   Pulse 100  -IR 90  -CP 88  -IR 88  -CP 88  -IR   Row Name 04/28/23 0700 04/28/23 0600 04/28/23 0500 04/28/23 0400 04/28/23 0300   CIWA-Ar   BP 98/65  -IR 97/63  -EH 89/60 Abnormal   -EH 91/58  -EH 97/63  -EH   Pulse 84  -IR 86  -EH 88  -EH 76  -EH 96  -EH   Row Name 04/28/23 0200 04/28/23 0100 04/28/23 0000 04/27/23 2300 04/27/23 2200   CIWA-Ar   BP 92/59  -EH 93/58  -EH 84/54 Abnormal   -EH 86/59 Abnormal   -EH 91/62  -EH   Pulse 98  -EH 92  -EH 88  -EH 78  -EH 88  -EH   Row Name 04/27/23 2100 04/27/23 2000 04/27/23 1848 04/27/23 1718 04/27/23 1703   CIWA-Ar   BP 91/61  -EH 87/54 Abnormal   -/63  -MM 90/55  -MM 80/50 Abnormal   -MM   Pulse 78  -EH 88  -EH -- 80  -MM 80  -MM   Row Name 04/27/23 1615 04/27/23 1500 04/27/23 1400 04/27/23 1330 04/27/23 1315   CIWA-Ar   BP 97/60  -MM 97/61  -/65  -/67  -/69  -MM   Pulse 78  -MM 82  -TK 82  -TK 92  -MM 80  -MM   Row Name 04/27/23 1300 04/27/23 1245 04/27/23 1215 04/27/23 1200 04/27/23 1100   CIWA-Ar   /67  -/65  -MM 99/63  -/64  -MM 93/60  -MM   Pulse 78  -MM 74  -MM 72  -MM 78  -MM 76  -MM   Row Name 04/27/23 1000 04/27/23 0915 04/27/23 0800 04/27/23 0600 04/27/23 0500   CIWA-Ar   /74  -/63  -MM 94/60  -MM 92/58  -EH 90/57  -EH   Pulse 90  -MM 88  -MM 82  -MM 74  -  -EH   Row Name 04/27/23 0400 04/27/23 0300 04/27/23 0200 04/27/23 0100 04/27/23 0000   CIWA-Ar   /70  -EH 90/60  -/76  -/77  -/71  -EH   Pulse 82  -EH 82  -EH 90  -EH 92  -EH 96  -EH   Row Name 04/26/23 2300 04/26/23 2200 04/26/23 2100 04/26/23 2000 04/26/23 1900   CIWA-Ar   /69  -/68  -/69  -EH 96/63  -EH 97/66  -TK   Pulse 80  -EH 84  -EH 84  -EH 88  -EH 86  -TK   Row Name 04/26/23 1800 04/26/23 1700 04/26/23 1600 04/26/23 1500 04/26/23 1408   CIWA-Ar   BP 85/55 Abnormal   -/63  -/59  -/59  -TK 82/59 Abnormal   -TK   Pulse 96  - Abnormal   - Abnormal   - Abnormal   -  -TK   Row Name 04/26/23 1400 04/26/23 1300 04/26/23 1200 04/26/23 1100 04/26/23 1000   CIWA-Ar   /57  -TK 97/56  -TK 88/57 Abnormal   -/65  -/59  -TK   Pulse 100  - Abnormal   - Abnormal   - Abnormal   - Abnormal   -TK   Row Name 04/26/23 0900 04/26/23 0800 04/26/23 0714 04/26/23 0700 04/26/23 0600   CIWA-Ar   /54  -/50  -TK 93/46 Abnormal   -TK 93/46 Abnormal   -TK 88/49 Abnormal   -EH   Pulse 106 Abnormal   - Abnormal   - Abnormal   - Abnormal   - Abnormal   -EH   Row Name 04/26/23 0500 04/26/23 0400 04/26/23 0300 04/26/23 0200 04/26/23 0100   CIWA-Ar   /62  -/63  -/85  -/68  -EH --   Pulse 112 Abnormal   -  - Abnormal   - Abnormal   - Abnormal   -EH   Nausea and Vomiting -- 0  -EH -- -- --   Tactile Disturbances -- 0  -EH -- -- --   Tremor -- 0  -EH -- -- --   Auditory Disturbances -- 0  -EH -- -- --   Paroxysmal Sweats -- 0  -EH -- -- --   Visual Disturbances -- 0  -EH -- -- --   Anxiety -- 0  -EH -- -- --   Headache, Fullness in Head -- 0  -EH -- -- --   Agitation -- 0  -EH -- -- --   Orientation and Clouding of Sensorium -- 0  -EH -- -- --   CIWA-Ar Total -- 0  -EH -- -- --   Row Name 04/26/23 0000 04/25/23 2300 04/25/23 2200 04/25/23 2100 04/25/23 2000   CIWA-Ar   /63  -/70  -/64  -EH 99/73  -/74  -EH   Pulse 114 Abnormal   - Abnormal   - Abnormal   - Abnormal   - Abnormal   -EH   Nausea and Vomiting 0  -EH -- 0  -EH -- 0  -EH   Tactile Disturbances 0  -EH -- 0  -EH -- 0  -EH   Tremor 0  -EH -- 0  -EH -- 0  -EH   Auditory Disturbances 0  -EH -- 0  -EH -- 0  -EH   Paroxysmal Sweats 0  -EH -- 0  -EH -- 0  -EH   Visual Disturbances 0  -EH -- 0  -EH -- 0  -EH   Anxiety 0  -EH -- 0  -EH -- 0  -EH   Headache, Fullness in Head 0  -EH -- 0  -EH -- 0  -EH   Agitation 0  -EH -- 0  -EH -- 0  -EH   Orientation and Clouding of Sensorium 0  -EH -- 0  -EH -- 0  -EH   CIWA-Ar Total 0  -EH -- 0  -EH -- 0  -EH   Row Name 04/25/23 1900 04/25/23 1844 04/25/23 1800 04/25/23 1729 04/25/23 1700   CIWA-Ar   BP 74/39 Abnormal   -EH 84/58 Abnormal   -JT 79/47 Abnormal   -/48 Abnormal   -/72  -JT   Pulse 124 Abnormal   - Abnormal   - Abnormal   - Abnormal   - Abnormal   -JT   Nausea and Vomiting 0  -EH -- 0  -JT -- 0  -JT   Tactile Disturbances 0  -EH -- 0  -JT -- 0  -JT   Tremor 0  -EH -- 0  -JT -- 0  -JT   Auditory Disturbances 0  -EH -- 0  -JT -- 0  -JT   Paroxysmal Sweats 0  -EH -- 0  -JT -- 0  -JT   Visual Disturbances 0  -EH -- 0  -JT -- 0  -JT   Anxiety 1  -EH -- 4  -JT -- 4  -JT   Headache, Fullness in Head 0  -EH -- 0  -JT -- 0  -JT   Agitation 2  -EH -- 4  -JT -- 4  -JT   Orientation and Clouding of Sensorium 0  -EH -- 0  -JT -- 0  -JT   CIWA-Ar Total 3  -EH -- 8  -JT -- 8  -JT   Row Name 04/25/23 1600 04/25/23 1400 04/25/23 1300 04/25/23 1121 04/25/23 1115   CIWA-Ar   /57  -/58  -LL --  unable to obtain md aware  -LL 97/54  -/72  -LL   Pulse 128 Abnormal   - Abnormal   - Abnormal   - Abnormal   - Abnormal   -LL   Row Name 04/25/23 1100 04/25/23 1030      CIWA-Ar   BP 82/42 Abnormal   -LL --  unable to get a automatic or alec  -LL      Pulse 128 Abnormal   - Abnormal   -LL        Pertinent Labs/Diagnostic Results:       Results from last 7 days   Lab Units 05/01/23  0640 04/30/23  0933 04/30/23  0455 04/29/23  0508 04/28/23  0443 04/27/23  1615 04/27/23  0617 04/26/23  1355 04/26/23  0429 04/25/23  1346 04/25/23  1045   WBC Thousand/uL 18 61*  --  16 93* 19 34* 15 14*  --  8 81  --  10 52*  --  20 57*   HEMOGLOBIN g/dL 8 8* 7 1* 6 8* 7 3* 7 4*   < > 7 2*   < > 8 2*   < > 12 1   HEMATOCRIT % 27 5* 22 6* 21 6* 22 3* 22 0*   < > 21 3*   < > 24 3*   < > 37 7   PLATELETS Thousands/uL 141*  --  93* 74* 98*  --  83*  --  74*  --  155   NEUTROS ABS Thousands/µL  --   --   --   --   --   --  8 01*  --  8 98*  --  17 58*   BANDS PCT %  --   --  1  --   --   --   --   --   --   --   --     < > = values in this interval not displayed       Results from last 7 days   Lab Units 04/30/23  0455   RETIC CT ABS  21,500   RETIC CT PCT % 1 06     Results from last 7 days   Lab Units 05/01/23  0815 05/01/23  0640 04/30/23  0455 04/29/23  1814 04/29/23  0508 04/28/23  1724 04/28/23  0447 04/28/23  0443 04/27/23  0452 04/27/23  0450 04/26/23  0429 04/26/23  0010   SODIUM mmol/L 143  --  144 143 143 138   < >  --    < > 130*   < > 125*   POTASSIUM mmol/L 3 9  --  3 9 4 7 4 0 4 7   < >  --    < > 3 8   < > 4 6   CHLORIDE mmol/L 109*  --  112* 112* 116* 111*   < >  --    < > 99   < > 89*   CO2 mmol/L 30  --  29 27 24 22   < >  --    < > 21   < > 19*   ANION GAP mmol/L 4  --  3* 4 3* 5   < >  --    < > 10   < > 17*   BUN mg/dL 14  --  15 19 23 26*   < >  --    < > 33*   < > 36*   CREATININE mg/dL 1 09  --  1 10 1 32* 1 34* 1 57*   < >  --    < > 2 09*   < > 2 78*   EGFR ml/min/1 73sq m 73  --  72 58 57 47   < >  --    < > 33   < > 23   CALCIUM mg/dL 8 1*  --  7 1* 7 6* 7 2* 7 3*   < >  --    < > 7 7*   < > 7 6*   CALCIUM, IONIZED mmol/L  --  1 07* 0 97*  --   --   --   --  1 08*  --  1 06*  --  0 94*   MAGNESIUM mg/dL  --  2 0 1 8 2 0 2 2 2 5   < >  --    < > 2 5   < >  --    PHOSPHORUS mg/dL  --  2 0* 3 0 4 1 1 7* 3 2   < >  --    < > 1 9*   < >  --     < > = values in this interval not displayed  Results from last 7 days   Lab Units 05/01/23  0815 04/30/23  0933 04/30/23  0455 04/29/23  0508 04/28/23  0443 04/27/23  0450 04/26/23  0850 04/26/23  0429 04/25/23  1704 04/25/23  1047   AST U/L 105*  --  133* 89* 89* 134* 140*  --    < >  --    ALT U/L 72  --  69 57 46 53 54  --    < >  --    ALK PHOS U/L 272*  --  238* 209* 169* 158* 168*  --    < >  --    TOTAL PROTEIN g/dL 5 3*  --  5 1* 4 8* 4 8* 4 8* 4 7*  --    < >  --    ALBUMIN g/dL 2 4*  --  2 6* 1 8* 1 7* 1 7* 1 9*  --    < >  --    TOTAL BILIRUBIN mg/dL 2 32*  --  2 36* 1 73* 1 53* 1 19* 1 38*  --    < >  --    BILIRUBIN DIRECT mg/dL  --   --  1 91* 1 36* 1 06*  --  0 93*  --   --   --    AMMONIA umol/L  --  43*  --   --   --   --   --  32  --  82*    < > = values in this interval not displayed       Results from last 7 days   Lab Units 05/01/23  0606 05/01/23  0022 04/30/23  2030 04/30/23  1734 04/30/23  1203 04/30/23  0802 04/30/23  5944 04/30/23  7787 04/30/23  0136 04/29/23  2359 04/29/23  1728 04/29/23  1151   POC GLUCOSE mg/dl 96 68 115 145* 81 93 152* 51* 84 67 112 128     Results from last 7 days   Lab Units 05/01/23  0815 04/30/23  0455 04/29/23  1814 04/29/23  9838 04/28/23  1724 04/28/23  0447 04/28/23  0007 04/27/23  1615 04/27/23  1135 04/27/23  0452 04/27/23  0450 04/27/23  0221   GLUCOSE RANDOM mg/dL 146* 66 111 93 145* 139 172* 260* 229* 205* 203* 173*     Results from last 7 days   Lab Units 04/26/23  0429   OSMOLALITY, SERUM mmol/*     Results from last 7 days   Lab Units 04/25/23  1704   HEMOGLOBIN A1C % 6 2*   EAG mg/dl 131     No results found for: BETA-HYDROXYBUTYRATE   Results from last 7 days   Lab Units 04/30/23  0932 04/30/23  0725 04/26/23  1856 04/25/23  1802   PH ART  7 411 7 300* 7 326* 7 286*   PCO2 ART mm Hg 48 1* 56 5* 50 7* 38 9   PO2 ART mm Hg 68 2* 61 0* 163 2* 296 1*   HCO3 ART mmol/L 29 9* 27 2 25 9 18 1*   BASE EXC ART mmol/L 4 7 0 4 -0 3 -7 9   O2 CONTENT ART mL/dL 10 5* 10 1* 12 3* 17 2   O2 HGB, ARTERIAL % 92 4* 86 8* 98 5* 99 2*   ABG SOURCE  Brachial, Right Brachial, Right  --  Line, Arterial     Results from last 7 days   Lab Units 05/01/23  0815 04/30/23  0613 04/28/23  1443   PH SANAZ  7 490* 7 243* 7 327   PCO2 SANAZ mm Hg 38 0* 64 7* 45 4   PO2 SANAZ mm Hg 139 2* 76 4* 35 7   HCO3 SANAZ mmol/L 28 3 27 3 23 2*   BASE EXC SANAZ mmol/L 4 7 -0 4 -2 7   O2 CONTENT SANAZ ml/dL 11 9 10 1 8 4   O2 HGB, VENOUS % 95 5* 90 6* 63 0         Results from last 7 days   Lab Units 04/26/23  0646 04/25/23  1801   CK TOTAL U/L 534* 465*     Results from last 7 days   Lab Units 04/25/23  2130 04/25/23  1346 04/25/23  1112   HS TNI 0HR ng/L  --   --  35   HS TNI 2HR ng/L  --  33  --    HSTNI D2 ng/L  --  -2  --    HS TNI 4HR ng/L 37  --   --    HSTNI D4 ng/L 2  --   --          Results from last 7 days   Lab Units 04/28/23  0443 04/27/23  0557 04/26/23  0429 04/25/23  1045   PROTIME seconds 16 6* 16 9* 16 4* 15 5*   INR  1 32* 1 35* 1 30* 1 21*   PTT seconds  --   --   --  28     Results from last 7 days   Lab Units 04/30/23  0455   TSH 3RD GENERATON uIU/mL 5 220*     Results from last 7 days   Lab Units 05/01/23  0640 04/28/23  0443 04/25/23  1045   PROCALCITONIN ng/ml 1 04* 1 90* 1 73*     Results from last 7 days   Lab Units 05/01/23  0815 04/26/23  0850 04/26/23  0543 04/26/23  0327 04/26/23  0010   LACTIC ACID mmol/L 1 4 2 6* 4 1* 5 9* 6 6*     Results from last 7 days   Lab Units 04/25/23  1801   NT-PRO BNP pg/mL 3,297*     Results from last 7 days   Lab Units 04/30/23  0455   FERRITIN ng/mL 2,507*     Results from last 7 days   Lab Units 04/25/23  1704   HEP B S AG  Non-reactive   HEP C AB  Non-reactive   HEP B C IGM  Non-reactive   HEP B C TOTAL AB  Non-reactive     Results from last 7 days   Lab Units 04/26/23  0646 04/25/23  1346   LIPASE u/L 595* 2,090*   AMYLASE IU/L  --  155*     Results from last 7 days   Lab Units 04/26/23  0432 04/26/23  0429   OSMOLALITY, SERUM mmol/KG  --  281*   OSMO UR mmol/  --      Results from last 7 days   Lab Units 04/26/23  0432 04/25/23  1606   CLARITY UA   --  Hazy   COLOR UA   --  Yellow   SPEC GRAV UA   --  1 044*   PH UA   --  5 5   GLUCOSE UA mg/dl  --  Negative   KETONES UA mg/dl  --  Negative   BLOOD UA   --  Moderate*   PROTEIN UA mg/dl  --  100 (2+)*   NITRITE UA   --  Negative   BILIRUBIN UA   --  Small*   UROBILINOGEN UA (BE) mg/dl  --  2 0*   LEUKOCYTES UA   --  Small*   WBC UA /hpf  --  30-50*   RBC UA /hpf  --  None Seen   BACTERIA UA /hpf  --  None Seen   EPITHELIAL CELLS WET PREP /hpf  --  Occasional   MUCUS THREADS   --  Moderate*   SODIUM UR  <5  --      Results from last 7 days   Lab Units 04/25/23  1346   ETHANOL LVL mg/dL 45*   ACETAMINOPHEN LVL ug/mL <2*   SALICYLATE LVL mg/dL <3*     Results from last 7 days   Lab Units 04/26/23  1228   C DIFF TOXIN B BY PCR  Negative     Results from last 7 days   Lab Units 04/26/23  1228   SALMONELLA SP PCR  None Detected   SHIGELLA SP/ENTEROINVASIVE E  COLI (EIEC)  None Detected   CAMPYLOBACTER SP (JEJUNI AND COLI)  None Detected   SHIGA TOXIN 1/SHIGA TOXIN 2  None Detected     Results from last 7 days   Lab Units 04/27/23  1726 04/25/23  1607 04/25/23  1112 04/25/23  1045   BLOOD CULTURE   --   --  No Growth After 5 Days   No Growth After 5 Days  SPUTUM CULTURE  1+ Growth of - Presumptive Candida albicans*  Few Colonies of  --   --   --    GRAM STAIN RESULT  Rare Epithelial Cells  No Polys or Bacteria seen  --   --   --    URINE CULTURE   --  80,000-89,000 cfu/ml  --   --      Medications:   Scheduled Medications:  budesonide, 0 5 mg, Nebulization, N79X  folic acid IVPB, 1 mg, Intravenous, Daily  formoterol, 20 mcg, Nebulization, Q12H  insulin lispro, 2-12 Units, Subcutaneous, Q6H JUVENTINO  ipratropium, 0 5 mg, Nebulization, TID  levothyroxine, 50 mcg, Oral, Early Morning  midodrine, 10 mg, Oral, TID AC  ANSHU ANTIFUNGAL, , Topical, BID  multivitamin stress formula, 1 tablet, Oral, Daily  nicotine, 21 mg, Transdermal, Daily  pantoprazole, 40 mg, Intravenous, Q24H JUVENTINO  predniSONE, 30 mg, Oral, Daily   Followed by  Cherylin Demetra ON 5/4/2023] predniSONE, 20 mg, Oral, Daily   Followed by  Cherylin Demetra ON 5/7/2023] predniSONE, 10 mg, Oral, Daily  psyllium, 1 packet, Oral, Daily  thiamine, 100 mg, Oral, Daily      Continuous IV Infusions: none     PRN Meds:  albuterol, 2 5 mg, Nebulization, Q4H PRN        Discharge Plan: d    Network Utilization Review Department  ATTENTION: Please call with any questions or concerns to [phone] and carefully listen to the prompts so that you are directed to the right person  All voicemails are confidential   [facility]oozer all requests for admission clinical reviews, approved or denied determinations and any other requests to dedicated fax number below belonging to the campus where the patient is receiving treatment   List of dedicated fax numbers for the Facilities:  [address] DENIALS (Administrative/Medical Necessity) [phone]   [address] [place] [address] (Maternity/NICU/Pediatrics) Juani Gonzalez [facility] [phone]   [facility] [phone]   Lou [phone]   [address] [place] [address] [facility] [place] [address] [facility] [address] [facility] 134 551 [facility] [phone] No Left mandible hemtoma and ecchymosis. Tenderness to palpate. Airway patent, Nasal mucosa clear. Mouth with normal mucosa. Throat has no vesicles, no oropharyngeal exudates and uvula is midline.

## 2024-09-04 NOTE — QUICK NOTE
ADDENDUM to discharge summary -- cannot fill Breztri as too early to fill script.  D/w IP pulm and  his outpatient pulm office -- no samples are available to give to patient at this time.  D/w pulm who recommended Trelegy as alternative if unable to secure Breztri at this time.  Trelegy sent to [facility], $0 copay, pt has been on this in past and agreeable to take at this time.  Pt can f/u with outpt pulm for further recommendations moving forward.

## 2024-09-04 NOTE — ASSESSMENT & PLAN NOTE
At baseline requires 4 L nasal cannula secondary to COPD  Initially required continuous BiPAP  Has since been weaned to nasal cannula  Improved with IV steroids, nebs, antibiotics.  Pulmonology following, discharged on prednisone taper as above

## 2024-09-04 NOTE — PHYSICAL THERAPY NOTE
Physical Therapy Evaluation    Patient Name: Mathew Bird    Today's Date: 9/4/2024     Problem List  Principal Problem:    Acute exacerbation of chronic obstructive pulmonary disease (COPD) (HCC)  Active Problems:    Crohn disease (HCC)    Coronary artery disease involving native coronary artery of native heart without angina pectoris    Elevated troponin    DEBBY (acute kidney injury) (HCC)    Multiple pulmonary nodules    VT (ventricular tachycardia) (HCC)    Acute on chronic respiratory failure (HCC)    Bronchitis    Positive blood culture       Past Medical History  Past Medical History:   Diagnosis Date    Anxiety     Asthma     Cardiac disease     Cervical stenosis (uterine cervix)     Chest pain     Chronic kidney disease     Colitis     Colon polyps     COPD (chronic obstructive pulmonary disease) (HCC)     2L @ HS and PRN    Coronary artery disease     Diverticulitis     Esophageal reflux     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HCC)     Heart failure, systolic, due to idiopathic cardiomyopathy (HCC) 5/21/2018    Hyperlipidemia     Hypertension     IBD (inflammatory bowel disease)     Myocardial infarction (HCC)     Old myocardial infarction     Paroxysmal atrial fibrillation (HCC) 8/24/2018    Perforation of colon (HCC)     Type 2 diabetes, diet controlled (HCC)     Ulcerative colitis (HCC)         Past Surgical History  Past Surgical History:   Procedure Laterality Date    ANGIOPLASTY      APPENDECTOMY      COLON SURGERY      COLONOSCOPY N/A 10/24/2016    Procedure: COLONOSCOPY;  Surgeon: Tirso Juárez MD;  Location: [facility];  Service:     CORONARY ANGIOPLASTY WITH STENT PLACEMENT      ESOPHAGOGASTRODUODENOSCOPY N/A 10/24/2016    Procedure: ESOPHAGOGASTRODUODENOSCOPY (EGD);  Surgeon: Tirso Juárez MD;  Location: [facility];  Service:     EXPLORATORY LAPAROTOMY W/ BOWEL RESECTION N/A 5/22/2018    Procedure: EXPLORATORY LAPAROTOMY, ILIOCOLECTOMY,  ILIOCOLONIC ANASTAMOSIS, LOOP ILIOSTOMY, REPAIR OF SEROSAL TEAR, EXTENSIVE LYSIS OF ADHESIONS, WOUND VAC PLACEMENT;  Surgeon: Tirso Juárez MD;  [facility];  Service: Colorectal    HEMICOLECTOMY      ILEOSTOMY CLOSURE N/A 10/5/2018    Procedure: CLOSURE ILEOSTOMY;  Surgeon: Tirso Juárez MD;  [facility];  Service: Colorectal    OTHER SURGICAL HISTORY      stent indications acute myocardial infarction    OK COLONOSCOPY FLX DX W/COLLJ SPEC WHEN PFRMD N/A 2/6/2018    Procedure: COLONOSCOPY;  Surgeon: Tirso Juárez MD;  [facility]: [facility];  Service: Colorectal    OK COLONOSCOPY FLX DX W/COLLJ SPEC WHEN PFRMD N/A 10/4/2018    Procedure: COLONOSCOPY;  Surgeon: Tirso Juárez MD;  [facility];  Service: Colorectal    TONSILLECTOMY             09/04/24 1123   PT Last Visit   PT Visit Date 09/04/24   Note Type   Note type Evaluation   Pain Assessment   Pain Assessment Tool 0-10   Pain Score No Pain   Restrictions/Precautions   Weight Bearing Precautions Per Order No   Other Precautions O2;Fall Risk;Multiple lines;Chair Alarm;Bed Alarm  (4L O2)   Home Living   Type of Home House   Home Layout Two level;Able to live on main level with bedroom/bathroom;Performs ADLs on one level  (0 KAREN, FFSU)   Home Equipment Walker;Cane;Other (Comment)  (Rollator)   Additional Comments Pt reports he lives on 1st floor of 2 SH, 0 KAREN. Pt was not using AD PTA. 4L O2 at baseline   Prior Function   Level of [place] Independent with ADLs;Independent with functional mobility   Lives With Daughter;Other (Comment)  (grand son)   Receives Help From Family   IADLs Independent with meal prep;Independent with medication management   Falls in the last 6 months 0   General   Family/Caregiver Present No   Cognition   Overall Cognitive Status WFL   Arousal/Participation Alert   Orientation Level Oriented to person;Oriented to place;Oriented to time   Memory Decreased recall of precautions   Following  Commands Follows one step commands without difficulty   Comments Pt agreeable to PT session and cooperative w/ encouragement.   RLE Assessment   RLE Assessment WFL   LLE Assessment   LLE Assessment WFL   Bed Mobility   Supine to Sit 5  Supervision   Additional items HOB elevated;Increased time required   Sit to Supine 5  Supervision   Additional items Increased time required;HOB elevated   Additional Comments Pt supine in bed upon arrival, returned to bed post session   Transfers   Sit to Stand 5  Supervision  (CGA)   Additional items Increased time required;Verbal cues   Stand to Sit 5  Supervision  (CGA)   Additional items Increased time required;Verbal cues   Additional Comments Transfers w/ RW   Ambulation/Elevation   Gait pattern Improper Weight shift;Decreased foot clearance;Short stride;Excessively slow   Gait Assistance 4  Minimal assist  (SUP w/ RW vs Min A w/o AD)   Additional items Assist x 1;Verbal cues   Assistive Device Rolling walker;None   Distance 50' w/ RW + 50' w/o AD   Ambulation/Elevation Additional Comments Pt initially ambulates w/ RW and SUP level. Trialed ambulation w/o RW, which is patient's baseline. x2 lateral LOB requiring MIN A to correct during ambulation w/o AD. Monitored SPO2 during ambulation, desat to 88-89% w/ noted SOB, recovers with standing rest break   Balance   Static Sitting Fair +   Dynamic Sitting Fair   Static Standing Fair -   Dynamic Standing Poor +   Ambulatory Poor +   Endurance Deficit   Endurance Deficit Yes   Endurance Deficit Description LOJA/SOB, fatigue   Activity Tolerance   Activity Tolerance Patient limited by fatigue   Medical Staff Made Aware ROSALINDA cervantes treat 2/2 medical complexity   Nurse Made Aware RN cleared   Assessment   Prognosis Good   Problem List Decreased strength;Decreased endurance;Impaired balance;Decreased mobility;Decreased safety awareness;Impaired judgement;Pain   Assessment Pt is a 64 y.o. male seen for PT evaluation s/p admit [facility] on 9/2/2024. Pt was admitted with a primary dx of: Acute exacerbation of chronic obstructive pulmonary disease.  PT now consulted for assessment of mobility and d/c needs.  Pts current comorbidities effecting treatment include: Crohn disease, CAD, DEBBY, acute on chronic resp failure. Pts current clinical presentation is Unstable/ Unpredictable (high complexity) due to Ongoing medical management for primary dx, Increased reliance on more restrictive AD compared to baseline, Decreased activity tolerance compared to baseline, Fall risk, Increased assistance needed from caregiver at current time, Increased O2 via NC from pts baseline, Continuous pulse oximetry monitoring . Prior to admission, pt was living in 2SH w/ [facility] and was IND w/o AD. Upon evaluation, pt currently is requiring SUP for bed mobility; CGA  for transfers; Min A for ambulation 50 ft w/ no AD;. Pt presents at PT eval functioning below baseline and currently w/ overall mobility deficits 2* to: BLE weakness, impaired balance, decreased endurance, gait deviations, pain, decreased activity tolerance compared to baseline, decreased functional mobility tolerance compared to baseline, decreased safety awareness, impaired judgement, fall risk, SOB upon exertion. Pt currently at a fall risk 2* to impairments listed above.  Pt will continue to benefit from skilled acute PT interventions to address stated impairments; to maximize functional mobility; for ongoing pt/ family training; and DME needs. At conclusion of PT session pt returned BTB, all needs in reach, and RN notified of session findings/recommendations with phone and call bell within reach. Pt denies any further questions at this time. The patient's AM-PAC Basic Mobility Inpatient Short Form Raw Score is 20. A Raw score of greater than 16 suggests the patient may benefit from discharge to home. Please also refer to the recommendation of the Physical Therapist for safe discharge planning.  Recommend home w/ HHPT upon hospital D/C.   Goals   Patient Goals to go home   STG Expiration Date 09/18/24   Short Term Goal #1 STG 1. Pt will be able to perform bed mobility tasks with mod I in order to improve overall functional mobility and assist in safe d/c.  STG 3. Pt will be able to perform functional transfer with mod I in order to improve overall functional mobility and assist in safe d/c. STG 4. Pt will be able to ambulate at least 200 feet with least restrictive device with mod I A in order to improve overall functional mobility and assist in safe d/c. STG 5. Pt will improve sitting/standing static/dynamic balance 1/2 grade in order to improve functional mobility and assist in safe d/c. STG 6. Pt will improve LE strength by 1/2 grade in order to improve functional mobility and assist in safe d/c. STG 7. Pt will be able to negotiate at least 10 stairs with least restrictive device with SUP A in order to improve overall functional mobility and assist in safe d/c.   PT Treatment Day 0   Plan   Treatment/Interventions Functional transfer training;LE strengthening/ROM;Elevations;Therapeutic exercise;Endurance training;Cognitive reorientation;Patient/family training;Equipment eval/education;Bed mobility;Gait training;Spoke to nursing;Spoke to case management;OT   PT Frequency 2-3x/wk   Discharge Recommendation   Rehab Resource Intensity Level, PT III (Minimum Resource Intensity)   Equipment Recommended Walker  (owns)   AM-PAC Basic Mobility Inpatient   Turning in Flat Bed Without Bedrails 3   Lying on Back to Sitting on Edge of Flat Bed Without Bedrails 3   Moving Bed to Chair 4   Standing Up From Chair Using Arms 4   Walk in Room 3   Climb 3-5 Stairs With Railing 3   Basic Mobility Inpatient Raw Score 20   Basic Mobility Standardized Score 43.99   [facility] Highest Level Of Mobility   -HLM Goal 6: Walk 10 steps or more   -HLM Achieved 7: Walk 25 feet or more   Modified [place] Scale   Modified [place]  Scale 3   End of Consult   Patient Position at End of Consult Supine;All needs within reach     Maryan Verduzco PT, DPT

## 2024-09-04 NOTE — PLAN OF CARE
Problem: SAFETY ADULT  Goal: Patient will remain free of falls  Description: INTERVENTIONS:  - Educate patient/family on patient safety including physical limitations  - Instruct patient to call for assistance with activity   - Consult OT/PT to assist with strengthening/mobility   - Keep Call bell within reach  - Keep bed low and locked with side rails adjusted as appropriate  - Keep care items and personal belongings within reach  - Initiate and maintain comfort rounds  - Make Fall Risk Sign visible to staff  - Offer Toileting every Hours, in advance of need  - Initiate/Maintain alarm  - Obtain necessary fall risk management equipment:   - Apply yellow socks and bracelet for high fall risk patients  - Consider moving patient to room near nurses station  Outcome: Progressing

## 2024-09-04 NOTE — OCCUPATIONAL THERAPY NOTE
Occupational Therapy Evaluation     Patient Name: Mathew Bird  Today's Date: 9/4/2024  Problem List  Principal Problem:    Acute exacerbation of chronic obstructive pulmonary disease (COPD) (HCC)  Active Problems:    Crohn disease (HCC)    Coronary artery disease involving native coronary artery of native heart without angina pectoris    Elevated troponin    DEBBY (acute kidney injury) (HCC)    Multiple pulmonary nodules    VT (ventricular tachycardia) (HCC)    Acute on chronic respiratory failure (HCC)    Bronchitis    Positive blood culture    Past Medical History  Past Medical History:   Diagnosis Date    Anxiety     Asthma     Cardiac disease     Cervical stenosis (uterine cervix)     Chest pain     Chronic kidney disease     Colitis     Colon polyps     COPD (chronic obstructive pulmonary disease) (HCC)     2L @ HS and PRN    Coronary artery disease     Diverticulitis     Esophageal reflux     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HCC)     Heart failure, systolic, due to idiopathic cardiomyopathy (HCC) 5/21/2018    Hyperlipidemia     Hypertension     IBD (inflammatory bowel disease)     Myocardial infarction (HCC)     Old myocardial infarction     Paroxysmal atrial fibrillation (HCC) 8/24/2018    Perforation of colon (HCC)     Type 2 diabetes, diet controlled (HCC)     Ulcerative colitis (HCC)      Past Surgical History  Past Surgical History:   Procedure Laterality Date    ANGIOPLASTY      APPENDECTOMY      COLON SURGERY      COLONOSCOPY N/A 10/24/2016    Procedure: COLONOSCOPY;  Surgeon: Tirso Juárez MD;  Location: [facility];  Service:     CORONARY ANGIOPLASTY WITH STENT PLACEMENT      ESOPHAGOGASTRODUODENOSCOPY N/A 10/24/2016    Procedure: ESOPHAGOGASTRODUODENOSCOPY (EGD);  Surgeon: Tirso Juárez MD;  Location: [facility];  Service:     EXPLORATORY LAPAROTOMY W/ BOWEL RESECTION N/A 5/22/2018    Procedure: EXPLORATORY LAPAROTOMY, ILIOCOLECTOMY, ILIOCOLONIC ANASTAMOSIS, LOOP ILIOSTOMY, REPAIR OF SEROSAL  TEAR, EXTENSIVE LYSIS OF ADHESIONS, WOUND VAC PLACEMENT;  Surgeon: Tirso Juárez MD;  Location: [facility];  Service: Colorectal    HEMICOLECTOMY      ILEOSTOMY CLOSURE N/A 10/5/2018    Procedure: CLOSURE ILEOSTOMY;  Surgeon: Tirso Juárez MD;  Location: [facility];  Service: Colorectal    OTHER SURGICAL HISTORY      stent indications acute myocardial infarction    GA COLONOSCOPY FLX DX W/COLLJ SPEC WHEN PFRMD N/A 2/6/2018    Procedure: COLONOSCOPY;  Surgeon: Tirso Juárez MD;  Location: [facility];  Service: Colorectal    GA COLONOSCOPY FLX DX W/COLLJ SPEC WHEN PFRMD N/A 10/4/2018    Procedure: COLONOSCOPY;  Surgeon: Tirso Juárez MD;  Location: [facility];  Service: Colorectal    TONSILLECTOMY           09/04/24 1139   OT Last Visit   OT Visit Date 09/04/24   Note Type   Note type Evaluation   Pain Assessment   Pain Assessment Tool 0-10   Pain Score No Pain   Restrictions/Precautions   Weight Bearing Precautions Per Order No   Other Precautions Bed Alarm;Chair Alarm;Multiple lines;O2;Fall Risk  (4 L O2)   Home Living   Type of Home House   Home Layout Two level;Performs ADLs on one level;Able to live on main level with bedroom/bathroom  (0 KAREN)   Home Equipment Walker;Cane  (rollator, not used at baseline)   Prior Function   Level of [place] Independent with ADLs;Independent with functional mobility;Independent with IADLS   Lives With Daughter  (grandson)   Receives Help From Family   IADLs Independent with driving;Independent with meal prep;Independent with medication management   Falls in the last 6 months 0   Lifestyle   Autonomy Pt reports (I) with ADLs, IADLs, and functional mobility with rollator. Pt +    Reciprocal Relationships family.   ADL   Where Assessed Edge of bed   Eating Assistance 5  Supervision/Setup   Grooming Assistance 5  Supervision/Setup   UB Bathing Assistance 5  Supervision/Setup   LB Bathing Assistance 4  Minimal Assistance   UB Dressing Assistance 5   Supervision/Setup   LB Dressing Assistance 4  Minimal Assistance   Toileting Assistance  4  Minimal Assistance   Functional Assistance 5  Supervision/Setup   Bed Mobility   Supine to Sit 5  Supervision   Additional items HOB elevated;Increased time required   Sit to Supine 5  Supervision   Additional items HOB elevated;Increased time required   Transfers   Sit to Stand 4  Minimal assistance  (CGA)   Additional items Increased time required;Verbal cues   Stand to Sit 4  Minimal assistance  (CGA)   Additional items Increased time required;Verbal cues   Additional Comments with rw   Functional Mobility   Functional Mobility 4  Minimal assistance  (CGA)   Additional Comments Pt requires CGA to ambulate short distances without AD. Pt supervision to ambulate with rw   Additional items Rolling walker  (and NO AD)   Balance   Static Sitting Fair +   Dynamic Sitting Fair   Static Standing Fair -   Dynamic Standing Poor +   Ambulatory Poor +   Activity Tolerance   Activity Tolerance Patient limited by fatigue   Medical Staff Made Aware PT   Nurse Made Aware RN Cleared   RUE Assessment   RUE Assessment WFL   LUE Assessment   LUE Assessment WFL   Hand Function   Gross Motor Coordination Functional   Fine Motor Coordination Functional   Cognition   Overall Cognitive Status WFL   Arousal/Participation Alert;Responsive;Cooperative   Attention Within functional limits   Orientation Level Oriented to person;Oriented to place;Oriented to time   Memory Decreased recall of precautions   Following Commands Follows one step commands without difficulty   Comments Pt agreeable to therapy   Assessment   Limitation Decreased ADL status;Decreased endurance;Decreased self-care trans;Decreased high-level ADLs   Prognosis Good   Assessment Pt is a 63 y/o male that was admitted to [facility] 9/2/2024 with acute COPD exacerbation. Pt  has a past medical history of Anxiety, Asthma, Cardiac disease, Cervical stenosis (uterine cervix), Chest  pain, Chronic kidney disease, Colitis, Colon polyps, COPD (chronic obstructive pulmonary disease) (HCC), Coronary artery disease, Diverticulitis, Esophageal reflux, 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HCC), Heart failure, systolic, due to idiopathic cardiomyopathy (HCC), Hyperlipidemia, Hypertension, IBD (inflammatory bowel disease), Myocardial infarction (HCC), Old myocardial infarction, Paroxysmal atrial fibrillation (HCC), Perforation of colon (HCC), Type 2 diabetes, diet controlled (HCC), and Ulcerative colitis (HCC). Pt lives with daughter and grandson in a two level house with first floor setup and 0 KAREN. Pt reports using no AD at baseline. Prior to admission pt (I) ADLs, IADLs, and functional mobility. Pt currently requires CGA to complete functional transfers and ambulate household distance functional mobility without AD. Pt requires MIN A to complete LB ADLs and toileting. Pt limited by decreased ADL status, functional transfers, functional mobility, and activity tolerance. Pt supine in bed at begning of session, pt supine in bed at end of session with alarm set and items within reach. The patient's raw score on the AM-PAC Daily Activity Inpatient Short Form is 21. A raw score of greater than or equal to 19 suggests the patient may benefit from discharge to home. Please refer to the recommendation of the Occupational Therapist for safe discharge planning. Recommend Level III minimum intensity OT services  at d/c to maximize pt function.   Goals   Patient Goals To go home   LTG Time Frame 10-14   Plan   Treatment Interventions ADL retraining;Functional transfer training;UE strengthening/ROM;Endurance training;Patient/family training;Equipment evaluation/education;Compensatory technique education;Continued evaluation;Energy conservation;Activityengagement   Goal Expiration Date 09/18/24   OT Frequency 2-3x/wk   Discharge Recommendation   Rehab Resource Intensity Level, OT III (Minimum Resource Intensity)   AM-PAC Daily  Activity Inpatient   Lower Body Dressing 3   Bathing 3   Toileting 3   Upper Body Dressing 4   Grooming 4   Eating 4   Daily Activity Raw Score 21   Daily Activity Standardized Score (Calc for Raw Score >=11) 44.27   AM-PAC Applied Cognition Inpatient   Following a Speech/Presentation 4   Understanding Ordinary Conversation 4   Taking Medications 4   Remembering Where Things Are Placed or Put Away 4   Remembering List of 4-5 Errands 3   Taking Care of Complicated Tasks 3   Applied Cognition Raw Score 22   Applied Cognition Standardized Score 47.83   End of Consult   Education Provided Yes   Patient Position at End of Consult Supine;Bed/Chair alarm activated;All needs within reach   Nurse Communication Nurse aware of consult     Goals:    Pt will complete functional transfers with MOD IND and appropriate AD to maximize pt safety.    Pt will complete bed mobility with MOD IND  to maximize pt safety.    Pt will complete grooming tasks with MOD IND to maximize pt independence.    Pt will complete LB ADLs with MOD IND  to maximize pt independence.    Pt will complete UB ADLs with MOD IND to maximize pt independence.    Pt will complete toileting with MOD IND to maximize pt independence.    Pt will complete functional household distance mobility with MOD IND and appropriate AD to maximize pt safety.    Pt will complete simulated IADL tasks with MOD IND to maximize pt independence.     Pt will be able to tolerate 30 minutes of functional activity during therapy session.    Pt will participate in continued cognitive evaluation for safe discharge planning.      FRANKI Edwards, OTR/L

## 2024-09-04 NOTE — DISCHARGE SUMMARY
[facility]  Discharge- Mathew Bird 1960, 64 y.o. male MRN: [id number]  Unit/Bed#: CW2 213-01 Encounter: [id number]  Primary Care Provider: Mariola Doyle MD   Date and time admitted to hospital: 9/2/2024  9:36 AM    * Acute exacerbation of chronic obstructive pulmonary disease (COPD) ([facility])  Assessment & Plan  Presented with shortness of breath and diffuse wheezing initially requiring continuous BiPAP since transition to nasal cannula.  Reports ran out of medication approximately 3 days ago  Likely in setting of acute bronchitis as noted on CT scan and running out of his maintenance medication   At baseline on 4 L nasal cannula  With elevated procalcitonin ordered IV ceftriaxone  Transition to p.o. Augmentin, azithromycin at discharge to complete 5 days of antibiotics  IV solu-medrol while inpatient.  Appreciate pulmonology input.  Transition to prednisone 40 mg daily, decrease by 10 mg every 3 days until stop  Back to baseline 4 L NC   Pt takes Breztri, reports he is struggling with this as medication doesn't always administer.  Pulm following    Positive blood culture  Assessment & Plan  1/2 blood cultures with GPC, coag negative staph.  This represents contaminant.      Bronchitis  Assessment & Plan  Presented with WBC of 11K, noted to have a fever 100.9 degrees in Emergency Department  Procalcitonin elevated   Suspect likely secondary to bronchitis is noted on CT scan  On IV ceftriaxone while inpatient, transition to Augmentin/azithromycin on discharge    Acute on chronic respiratory failure (HCC)  Assessment & Plan  At baseline requires 4 L nasal cannula secondary to COPD  Initially required continuous BiPAP  Has since been weaned to nasal cannula  Improved with IV steroids, nebs, antibiotics.  Pulmonology following, discharged on prednisone taper as above    VT (ventricular tachycardia) ([facility])  Assessment & Plan  History of VT in the past  Continue  Toprol    Multiple pulmonary nodules  Assessment & Plan  Noted on CT imaging  Repeat chest CT in 6 months -- discussed with patient     DEBBY (acute kidney injury) (HCC)  Assessment & Plan  Baseline creatinine approximately 1.3-1.5, presented with a creatinine of 1.96  Suspect secondary to volume depletion  Resolved with IV fluids, will discontinue     Elevated troponin  Assessment & Plan  Noted to be 58, 55, 41  likely secondary to acute respiratory failure  EKG without any signs of acute ischemia     Coronary artery disease involving native coronary artery of native heart without angina pectoris  Assessment & Plan  Status post PCI  Continue aspirin, statin, beta-blocker    Crohn disease (HCC)  Assessment & Plan  Continue sulfasalazine        Medical Problems       Resolved Problems  Date Reviewed: 9/4/2024   None       Discharging Physician / Practitioner: Rachel Denton PA-C  PCP: Mariola Doyle MD  Admission Date:   Admission Orders (From admission, onward)       Ordered        09/02/24 1508  Inpatient Admission  Once                          Discharge Date: 09/04/24    Consultations During Hospital Stay:  Pulmonology     Procedures Performed:   None     Significant Findings / Test Results:   CTA chest: No PE.  Bronchitis.  Scattered small GGO, pulmonary nodules   Elevated procalcitonin     Incidental Findings:   None     Test Results Pending at Discharge (will require follow up):   None     Outpatient Tests Requested:  None    Complications:  None    Reason for Admission: COPD Exacerbation     Hospital Course:   Mathew Bird is a 64 y.o. male patient who originally presented to the hospital on 9/2/2024 due to shortness of breath, cough.  He ran out of his inhalers 3 days prior to admission.  He initially required BIPAP in ED which was weaned to NC by time of admission.  CTA chest with no PE, but findings c/w bronchitis.  He was noted to have elevated procalcitonin as well.  He was dx with COPD exacerbation,  bronchitis and admitted to the hospital for further evaluation and treatment.      He improved with IV steroids, neb treatments, antibiotics.  He was seen by pulmonology.  He will be transitioned to PO prednisone starting at 40 mg daily and decrease 10 mg every 3 days until stop.  He should have close f/u with pulm at discharge.     Will rx Augmentin, azithromycin to complete 5 day course of antibiotic at discharge.      Please see above list of diagnoses and related plan for additional information.     Condition at Discharge: good    Discharge Day Visit / Exam:   Subjective:  Feels a lot better, not as SOB.  Wants to go home.   Vitals: Blood Pressure: 108/65 (09/04/24 0756)  Pulse: 99 (09/04/24 0756)  Temperature: 97.8 °F (36.6 °C) (09/04/24 0756)  Temp Source: Tympanic (09/02/24 0939)  Respirations: 16 (09/04/24 0756)  SpO2: 96 % (09/04/24 0756)  Exam:   Physical Exam  Vitals reviewed.   Constitutional:       General: He is not in acute distress.     Appearance: He is not toxic-appearing.   HENT:      Head: Normocephalic and atraumatic.   Eyes:      Extraocular Movements: Extraocular movements intact.   Cardiovascular:      Rate and Rhythm: Normal rate and regular rhythm.   Pulmonary:      Effort: Pulmonary effort is normal. No respiratory distress.      Comments: Wheezing significantly improved from prior exam  Abdominal:      General: Bowel sounds are normal. There is no distension.      Palpations: Abdomen is soft.   Musculoskeletal:         General: Normal range of motion.   Neurological:      General: No focal deficit present.      Mental Status: He is alert and oriented to person, place, and time.   Psychiatric:         Mood and Affect: Mood normal.         Behavior: Behavior normal.         Thought Content: Thought content normal.          Discussion with Family: Patient declined call to .     Discharge instructions/Information to patient and family:   See after visit summary for information  provided to patient and family.      Provisions for Follow-Up Care:  See after visit summary for information related to follow-up care and any pertinent home health orders.      Mobility at time of Discharge:   Basic Mobility Inpatient Raw Score: 24  JH-HLM Goal: 8: Walk 250 feet or more  JH-HLM Achieved: 7: Walk 25 feet or more  HLM Goal NOT achieved. Continue to encourage mobility in post discharge setting.     Disposition:   Home    Planned Readmission: no     Discharge Statement:  I spent 38 minutes discharging the patient. This time was spent on the day of discharge. I had direct contact with the patient on the day of discharge. Greater than 50% of the total time was spent examining patient, answering all patient questions, arranging and discussing plan of care with patient as well as directly providing post-discharge instructions.  Additional time then spent on discharge activities.    Discharge Medications:  See after visit summary for reconciled discharge medications provided to patient and/or family.      **Please Note: This note may have been constructed using a voice recognition system**

## 2024-09-04 NOTE — ASSESSMENT & PLAN NOTE
Presented with WBC of 11K, noted to have a fever 100.9 degrees in Emergency Department  Procalcitonin elevated   Suspect likely secondary to bronchitis is noted on CT scan  On IV ceftriaxone while inpatient, transition to Augmentin/azithromycin on discharge

## 2024-09-04 NOTE — PLAN OF CARE
Problem: OCCUPATIONAL THERAPY ADULT  Goal: Performs self-care activities at highest level of function for planned discharge setting.  See evaluation for individualized goals.  Description: Treatment Interventions: ADL retraining, Functional transfer training, UE strengthening/ROM, Endurance training, Patient/family training, Equipment evaluation/education, Compensatory technique education, Continued evaluation, Energy conservation, Activityengagement          See flowsheet documentation for full assessment, interventions and recommendations.   Outcome: Progressing  Note: Limitation: Decreased ADL status, Decreased endurance, Decreased self-care trans, Decreased high-level ADLs  Prognosis: Good  Assessment: Pt is a 65 y/o male that was admitted to [facility] 9/2/2024 with acute COPD exacerbation. Pt  has a past medical history of Anxiety, Asthma, Cardiac disease, Cervical stenosis (uterine cervix), Chest pain, Chronic kidney disease, Colitis, Colon polyps, COPD (chronic obstructive pulmonary disease) (HCC), Coronary artery disease, Diverticulitis, Esophageal reflux, 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[facility]), Heart failure, systolic, due to idiopathic cardiomyopathy ([facility]), Hyperlipidemia, Hypertension, IBD (inflammatory bowel disease), Myocardial infarction ([facility]), Old myocardial infarction, Paroxysmal atrial fibrillation ([facility]), Perforation of colon ([facility]), Type 2 diabetes, diet controlled ([facility]), and Ulcerative colitis ([facility]). Pt lives with daughter and grandson in a two level house with first floor setup and 0 KAREN. Pt reports using no AD at baseline. Prior to admission pt (I) ADLs, IADLs, and functional mobility. Pt currently requires CGA to complete functional transfers and ambulate household distance functional mobility without AD. Pt requires MIN A to complete LB ADLs and toileting. Pt limited by decreased ADL status, functional transfers, functional mobility, and activity tolerance. Pt supine in bed at begning of  session, pt supine in bed at end of session with alarm set and items within reach. The patient's raw score on the AM-PAC Daily Activity Inpatient Short Form is 21. A raw score of greater than or equal to 19 suggests the patient may benefit from discharge to home. Please refer to the recommendation of the Occupational Therapist for safe discharge planning. Recommend Level III minimum intensity OT services  at d/c to maximize pt function.     Rehab Resource Intensity Level, OT: III (Minimum Resource Intensity)

## 2024-09-04 NOTE — ASSESSMENT & PLAN NOTE
Presented with shortness of breath and diffuse wheezing initially requiring continuous BiPAP since transition to nasal cannula.  Reports ran out of medication approximately 3 days ago  Likely in setting of acute bronchitis as noted on CT scan and running out of his maintenance medication   At baseline on 4 L nasal cannula  With elevated procalcitonin ordered IV ceftriaxone  Transition to p.o. Augmentin, azithromycin at discharge to complete 5 days of antibiotics  IV solu-medrol while inpatient.  Appreciate pulmonology input.  Transition to prednisone 40 mg daily, decrease by 10 mg every 3 days until stop  Back to baseline 4 L NC   Pt takes Leah, reports he is struggling with this as medication doesn't always administer.  Pulm following

## 2024-09-04 NOTE — PROGRESS NOTES
Progress Note - Pulmonary   Mathew Bird 64 y.o. male MRN: [id number]  Unit/Bed#: CW2 213-01 Encounter: [id number]      Assessment:  COPD exacerbation  Tobacco dependence  Medication noncompliance     64-year-old male presents medical with respiratory COPD presenting with shortness of breath admitted for COPD exacerbation likely in setting of medication noncompliance/continued smoking.     Recommendations              - Monitor O2 and supplement as needed for goal sat more than 88%              - Patient will require ambulatory O2 assessment prior to discharge  - Can titrate solumedrol from BID to Daily PO today, and for slow steroid taper decrease by 10 mg q 3 days.  - Counseled patient on tobacco cessation and supplement with nicotine patches as needed              - To continue with ipratropium and Xopenex nebulizations 3 times daily  - To continue with Symbicort 2 puffs twice daily and umeclidinium inpatient  - For discharge on home regimen: Breztri 2 puffs twice daily, albuterol as a rescue inhaler 2 puffs every 4 as needed & albuterol nebulization PRN  - Antibiotics per primary team  - D/W pt proper inhaler technique.   -To coordinate with nurse care manager to ensure patient able to obtain inhalers covered by insurance. To ensure medications available upon discharge   - Patient will require formal PFTs in outpt setting once out of acute exacerbation  -Patient will need follow-up appointment with pulmonary office within 1 month of discharge.     Subjective:   Patient seen and examined at bedside. No acute events reported overnight. Patient adamant on being discharged today. Concerned about medications.     Objective:   Vitals: Blood pressure 98/54, pulse 104, temperature 98.2 °F (36.8 °C), resp. rate 19, SpO2 95%., There is no height or weight on file to calculate BMI.      Intake/Output Summary (Last 24 hours) at 9/4/2024 0719  Last data filed at 9/3/2024 1700  Gross per 24 hour   Intake 480 ml   Output  --   Net 480 ml         Physical Exam  Gen: Awake, alert, oriented x 3, no acute distress  HEENT: Mucous membranes moist, no oral lesions, no thrush  NECK: No accessory muscle use, JVP not elevated  Cardiac: Regular, single S1, single S2, no murmurs, no rubs, no gallops  Lungs: Bilateral expiratory wheezing auscultated posteriorly scattered throughout.   Abdomen: normoactive bowel sounds, soft nontender, nondistended, no rebound or rigidity, no guarding  Extremities: no cyanosis, no clubbing, no edema    Labs: I have personally reviewed pertinent lab results.  Laboratory and Diagnostics  Results from last 7 days   Lab Units 09/03/24  0509 09/02/24  1005   WBC Thousand/uL 8.41 11.48*   HEMOGLOBIN g/dL 11.1* 13.7   HEMATOCRIT % 37.2 44.8   PLATELETS Thousands/uL 133* 163   SEGS PCT %  --  85*   BANDS PCT % 16*  --    MONO PCT % 0* 7   EOS PCT % 0 0     Results from last 7 days   Lab Units 09/04/24  0705 09/03/24  0509 09/02/24  1031   SODIUM mmol/L 141 139 141   POTASSIUM mmol/L 4.5 4.3 4.5   CHLORIDE mmol/L 107 104 102   CO2 mmol/L 27 27 26   ANION GAP mmol/L 7 8 13   BUN mg/dL 24 22 19   CREATININE mg/dL 1.34* 1.54* 1.96*   CALCIUM mg/dL 7.4* 7.3* 8.5   GLUCOSE RANDOM mg/dL 225* 188* 178*   ALT U/L  --   --  27   AST U/L  --   --  25   ALK PHOS U/L  --   --  95   ALBUMIN g/dL  --   --  3.8   TOTAL BILIRUBIN mg/dL  --   --  0.57     Results from last 7 days   Lab Units 09/03/24  0509   MAGNESIUM mg/dL 2.3      Results from last 7 days   Lab Units 09/02/24  1005   INR  1.01   PTT seconds 30          Results from last 7 days   Lab Units 09/02/24  1031   LACTIC ACID mmol/L 1.0                 Results from last 7 days   Lab Units 09/02/24  1005   D-DIMER QUANTITATIVE ug/ml FEU 4.14*     Results from last 7 days   Lab Units 09/04/24  0620 09/03/24  0509 09/02/24  1031   PROCALCITONIN ng/ml 0.41* 0.64* 1.12*       Imaging and other studies: I have personally reviewed pertinent reports.        Love Jackson MD  Pulmonary &  Critical Care Fellow PGY IV  [facility]

## 2024-09-04 NOTE — PLAN OF CARE
Problem: PHYSICAL THERAPY ADULT  Goal: Performs mobility at highest level of function for planned discharge setting.  See evaluation for individualized goals.  Description: Treatment/Interventions: Functional transfer training, LE strengthening/ROM, Elevations, Therapeutic exercise, Endurance training, Cognitive reorientation, Patient/family training, Equipment eval/education, Bed mobility, Gait training, Spoke to nursing, Spoke to case management, OT  Equipment Recommended: Walker (owns)       See flowsheet documentation for full assessment, interventions and recommendations.  Note: Prognosis: Good  Problem List: Decreased strength, Decreased endurance, Impaired balance, Decreased mobility, Decreased safety awareness, Impaired judgement, Pain  Assessment: Pt is a 64 y.o. male seen for PT evaluation s/p admit to [facility] on 9/2/2024. Pt was admitted with a primary dx of: Acute exacerbation of chronic obstructive pulmonary disease.  PT now consulted for assessment of mobility and d/c needs.  Pts current comorbidities effecting treatment include: Crohn disease, CAD, DEBBY, acute on chronic resp failure. Pts current clinical presentation is Unstable/ Unpredictable (high complexity) due to Ongoing medical management for primary dx, Increased reliance on more restrictive AD compared to baseline, Decreased activity tolerance compared to baseline, Fall risk, Increased assistance needed from caregiver at current time, Increased O2 via NC from pts baseline, Continuous pulse oximetry monitoring . Prior to admission, pt was living in [address] [place] [address] and was IND w/o AD. Upon evaluation, pt currently is requiring SUP for bed mobility; CGA  for transfers; Min A for ambulation 50 ft w/ no AD;. Pt presents at PT eval functioning below baseline and currently w/ overall mobility deficits 2* to: BLE weakness, impaired balance, decreased endurance, gait deviations, pain, decreased activity tolerance compared to baseline,  decreased functional mobility tolerance compared to baseline, decreased safety awareness, impaired judgement, fall risk, SOB upon exertion. Pt currently at a fall risk 2* to impairments listed above.  Pt will continue to benefit from skilled acute PT interventions to address stated impairments; to maximize functional mobility; for ongoing pt/ family training; and DME needs. At conclusion of PT session pt returned BTB, all needs in reach, and RN notified of session findings/recommendations with phone and call bell within reach. Pt denies any further questions at this time. The patient's AM-PAC Basic Mobility Inpatient Short Form Raw Score is 20. A Raw score of greater than 16 suggests the patient may benefit from discharge to home. Please also refer to the recommendation of the Physical Therapist for safe discharge planning. Recommend home w/ HHPT upon hospital D/C.        Rehab Resource Intensity Level, PT: III (Minimum Resource Intensity)    See flowsheet documentation for full assessment.

## 2024-09-04 NOTE — PLAN OF CARE
Problem: PAIN - ADULT  Goal: Verbalizes/displays adequate comfort level or baseline comfort level  Description: Interventions:  - Encourage patient to monitor pain and request assistance  - Assess pain using appropriate pain scale  - Administer analgesics based on type and severity of pain and evaluate response  - Implement non-pharmacological measures as appropriate and evaluate response  - Consider cultural and social influences on pain and pain management  - Notify physician/advanced practitioner if interventions unsuccessful or patient reports new pain  Outcome: Progressing     Problem: INFECTION - ADULT  Goal: Absence or prevention of progression during hospitalization  Description: INTERVENTIONS:  - Assess and monitor for signs and symptoms of infection  - Monitor lab/diagnostic results  - Monitor all insertion sites, i.e. indwelling lines, tubes, and drains  - Monitor endotracheal if appropriate and nasal secretions for changes in amount and color  - [place] appropriate cooling/warming therapies per order  - Administer medications as ordered  - Instruct and encourage patient and family to use good hand hygiene technique  - Identify and instruct in appropriate isolation precautions for identified infection/condition  Outcome: Progressing  Goal: Absence of fever/infection during neutropenic period  Description: INTERVENTIONS:  - Monitor WBC    Outcome: Progressing     Problem: SAFETY ADULT  Goal: Patient will remain free of falls  Description: INTERVENTIONS:  - Educate patient/family on patient safety including physical limitations  - Instruct patient to call for assistance with activity   - Consult OT/PT to assist with strengthening/mobility   - Keep Call bell within reach  - Keep bed low and locked with side rails adjusted as appropriate  - Keep care items and personal belongings within reach  - Initiate and maintain comfort rounds  - Make Fall Risk Sign visible to staff  - Offer Toileting every 4 Hours,  in advance of need  - Initiate/Maintain 4alarm  - Obtain necessary fall risk management equipment: 4  - Apply yellow socks and bracelet for high fall risk patients  - Consider moving patient to room near nurses station  Outcome: Progressing  Goal: Maintain or return to baseline ADL function  Description: INTERVENTIONS:  -  Assess patient's ability to carry out ADLs; assess patient's baseline for ADL function and identify physical deficits which impact ability to perform ADLs (bathing, care of mouth/teeth, toileting, grooming, dressing, etc.)  - Assess/evaluate cause of self-care deficits   - Assess range of motion  - Assess patient's mobility; develop plan if impaired  - Assess patient's need for assistive devices and provide as appropriate  - Encourage maximum independence but intervene and supervise when necessary  - Involve family in performance of ADLs  - Assess for home care needs following discharge   - Consider OT consult to assist with ADL evaluation and planning for discharge  - Provide patient education as appropriate  Outcome: Progressing  Goal: Maintains/Returns to pre admission functional level  Description: INTERVENTIONS:  - Perform AM-PAC 6 Click Basic Mobility/ Daily Activity assessment daily.  - Set and communicate daily mobility goal to care team and patient/family/caregiver.   - Collaborate with rehabilitation services on mobility goals if consulted  - Perform Range of Motion 4 times a day.  - Reposition patient every 4 hours.  - Dangle patient 4 times a day  - Stand patient 4 times a day  - Ambulate patient 4 times a day  - Out of bed to chair 4 times a day   - Out of bed for meals 4 times a day  - Out of bed for toileting  - Record patient progress and toleration of activity level   Outcome: Progressing     Problem: DISCHARGE PLANNING  Goal: Discharge to home or other facility with appropriate resources  Description: INTERVENTIONS:  - Identify barriers to discharge w/patient and caregiver  -  Arrange for needed discharge resources and transportation as appropriate  - Identify discharge learning needs (meds, wound care, etc.)  - Arrange for interpretive services to assist at discharge as needed  - Refer to Case Management Department for coordinating discharge planning if the patient needs post-hospital services based on physician/advanced practitioner order or complex needs related to functional status, cognitive ability, or social support system  Outcome: Progressing     Problem: Knowledge Deficit  Goal: Patient/family/caregiver demonstrates understanding of disease process, treatment plan, medications, and discharge instructions  Description: Complete learning assessment and assess knowledge base.  Interventions:  - Provide teaching at level of understanding  - Provide teaching via preferred learning methods  Outcome: Progressing

## 2024-09-05 ENCOUNTER — TRANSITIONAL CARE MANAGEMENT (OUTPATIENT)
Dept: FAMILY MEDICINE CLINIC | Facility: CLINIC | Age: 64
End: 2024-09-05

## 2024-09-05 ENCOUNTER — PATIENT OUTREACH (OUTPATIENT)
Dept: CASE MANAGEMENT | Facility: OTHER | Age: 64
End: 2024-09-05

## 2024-09-05 ENCOUNTER — TELEPHONE (OUTPATIENT)
Dept: FAMILY MEDICINE CLINIC | Facility: CLINIC | Age: 64
End: 2024-09-05

## 2024-09-05 DIAGNOSIS — Z71.89 COMPLEX CARE COORDINATION: Primary | ICD-10-CM

## 2024-09-05 LAB
BACTERIA BLD CULT: ABNORMAL
GRAM STN SPEC: ABNORMAL
S EPIDERMIDIS DNA BLD POS QL NAA+NON-PRB: DETECTED

## 2024-09-05 NOTE — UTILIZATION REVIEW
NOTIFICATION OF ADMISSION DISCHARGE   This is a Notification of Discharge from [facility]. Please be advised that this patient has been discharge from our facility. Below you will find the admission and discharge date and time including the patient’s disposition.   UTILIZATION REVIEW CONTACT:  Srinivasan Jimenez  Utilization   Network Utilization Review Department  Phone: [phone] x carefully listen to the prompts. All voicemails are confidential.  Email: [email or web address]     ADMISSION INFORMATION  PRESENTATION DATE: 9/2/2024  9:36 AM  OBERVATION ADMISSION DATE: N/A  INPATIENT ADMISSION DATE: 9/2/24  3:08 PM   DISCHARGE DATE: 9/4/2024  5:55 PM   DISPOSITION:Home/Self Care    Network Utilization Review Department  ATTENTION: Please call with any questions or concerns to [phone] and carefully listen to the prompts so that you are directed to the right person. All voicemails are confidential.   For Discharge needs, contact Care Management DC Support Team at [phone] opt. 2  Send all requests for admission clinical reviews, approved or denied determinations and any other requests to dedicated fax number below belonging to the campus where the patient is receiving treatment. List of dedicated fax numbers for the Facilities:  FACILITY NAME UR FAX NUMBER   ADMISSION DENIALS (Administrative/Medical Necessity) [phone]   DISCHARGE SUPPORT TEAM ([facility]) [phone]   PARENT CHILD HEALTH (Maternity/NICU/Pediatrics) [phone]   [facility] [phone]   [facility] [phone]   [facility] [phone]   [facility] [phone]   [facility] [phone]   [facility] [phone]   [facility] [phone]   [facility] [phone]   [facility]  Nutrition Assessment: 
 
RECOMMENDATIONS:  
Continue Regular diet and Ensure TID ASSESSMENT:  
Chart reviewed. Pt getting procedure done at time of attempted visit (wan placement?). Per RN flowsheet's his appetite has been fairly good. Noted plan for possible discharge soon. Labs reviewed, WNL. BM noted today. Current intake is likely adequate to meet est needs. Dietitians Intervention(s)/Plan(s): Continue diet and ONS SUBJECTIVE/OBJECTIVE:  
 
Diet Order: Regular, Other (comment) (Ensure TID ) 
% Eaten:  Patient Vitals for the past 72 hrs: 
 % Diet Eaten 08/03/18 1239 50 % 08/03/18 1017 50 % 08/02/18 1224 100 % 07/31/18 1949 75 % Pertinent Medications:dulcolax, colace, levaquin, flagyl. Chemistries: 
Lab Results Component Value Date/Time Sodium 138 08/03/2018 05:27 AM  
 Potassium 4.3 08/03/2018 05:27 AM  
 Chloride 106 08/03/2018 05:27 AM  
 CO2 26 08/03/2018 05:27 AM  
 Anion gap 6 08/03/2018 05:27 AM  
 Glucose 105 (H) 08/03/2018 05:27 AM  
 BUN 21 (H) 08/03/2018 05:27 AM  
 Creatinine 0.76 08/03/2018 05:27 AM  
 BUN/Creatinine ratio 28 (H) 08/03/2018 05:27 AM  
 GFR est AA >60 08/03/2018 05:27 AM  
 GFR est non-AA >60 08/03/2018 05:27 AM  
 Calcium 8.3 (L) 08/03/2018 05:27 AM  
 Albumin 3.5 07/24/2018 11:20 AM  
  
Anthropometrics: Height:   Weight: 52.1 kg (114 lb 13.8 oz)   []bed scale    []stated   []unknown IBW (%IBW):   ( ) UBW (%UBW):   (  %) BMI: Body mass index is 18.54 kg/(m^2). This BMI is indicative of: 
[x]Underweight   []Normal   []Overweight   [] Obesity   [] Extreme Obesity (BMI>40) Estimated Nutrition Needs (Based on): 1765 Kcals/day (MSJ 1165 x 1.3 (+250 for wt gain) ) , 66 g (1.2gPro/kg) Protein Carbohydrate: At Least 130 g/day  Fluids: 1800 mL/day Last BM: 8/3   [x]Active     []Hyperactive  []Hypoactive       [] Absent   BS Skin:    [] Intact   [x] Incision  [] Breakdown   [] DTI   [] Tears/Excoriation/Abrasion  []Edema [] Other: Wt Readings from Last 30 Encounters:  
07/31/18 52.1 kg (114 lb 13.8 oz)  
07/24/18 54.7 kg (120 lb 9.5 oz)  
07/23/18 54.6 kg (120 lb 6.4 oz) 07/11/18 53.5 kg (118 lb)  
06/25/18 53.7 kg (118 lb 6.4 oz) 03/23/18 55.8 kg (123 lb) 12/01/17 63 kg (138 lb 12.8 oz) 10/11/17 57.2 kg (126 lb 3.2 oz) 04/11/17 55.8 kg (123 lb 1.6 oz) 01/06/17 67.7 kg (149 lb 3.2 oz) 10/10/16 57.7 kg (127 lb 4.8 oz) 04/27/16 53.7 kg (118 lb 4.8 oz) 06/01/15 62.4 kg (137 lb 9.6 oz) 12/03/14 60.4 kg (133 lb 3.2 oz) 11/05/14 55.3 kg (122 lb)  
07/29/14 54.4 kg (120 lb)  
03/20/14 59.9 kg (132 lb)  
03/12/14 70.3 kg (155 lb)  
09/24/13 65.8 kg (145 lb)  
09/13/13 65.8 kg (145 lb)  
09/10/13 65.8 kg (145 lb)  
06/18/13 59 kg (130 lb)  
01/25/12 65.8 kg (145 lb)  
11/28/11 61.7 kg (136 lb)  
09/14/11 59.9 kg (132 lb)  
07/20/11 71.2 kg (157 lb) 05/26/10 72.6 kg (160 lb) NUTRITION DIAGNOSES:  
Problem:  Inadequate protein-energy intake Etiology: related to dementia Signs/Symptoms: as evidenced by BMI 18.5, cachexia. Previous dx re: inadequate protein energy intake resolving, appetite fairly good and pt on PO supplements. NUTRITION INTERVENTIONS: 
Meals/Snacks: General/healthful diet   Supplements: Commercial supplement GOAL:  
Pt will consume >70% of meals/supplements in 4-6 days. NUTRITION MONITORING AND EVALUATION Previous Goal: Pt will consume >70% of meals/supplements in 2-4 days Previous Goal Met: Yes Previous Recommendations Implemented: Yes Cultural, Zoroastrianism, or Ethnic Dietary Needs: None LEARNING NEEDS (Diet, Food/Nutrient-Drug Interaction):  
 [x] None Identified 
 [] Identified and Education Provided/Documented 
 [] Identified and Pt declined/was not appropriate [x] Interdisciplinary Care Plan Reviewed/Documented  
 [x] Participated in Discharge Planning: Regular diet + PO supplement TID [] Interdisciplinary Rounds NUTRITION RISK:  
 [] High [facility] [phone]   [facility] [phone]   [facility] [phone]   [facility] [phone]          [x] Moderate           [x]  Low  []  Minimal/Uncompromised Aziza Kovacs RD, [facility] Pager [phone] Weekend Pager [phone]

## 2024-09-05 NOTE — PROGRESS NOTES
Outpatient Care Management Note    HRR referral. Chart reviewed.     Patient was hospitalized at [facility] 9/2/24-9/4/24 for acute exacerbation of COPD.  Per chart review, patient presented with shortness of breath and diffuse wheezing.  Likely in the setting of acute bronchitis, noted on CT scan, and running out of maintenance medications.  Baseline O2- 4 liters nasal cannula.    RN CM placed outreach call to patient. No patient answer. Left VM message and included RN CM contact information and request for return call.    RN CM will scheduled second outreach attempt within one week if no patient response received prior.

## 2024-09-07 LAB — BACTERIA BLD CULT: NORMAL

## 2024-09-09 ENCOUNTER — PATIENT OUTREACH (OUTPATIENT)
Dept: CASE MANAGEMENT | Facility: OTHER | Age: 64
End: 2024-09-09

## 2024-09-09 NOTE — LETTER
Date: 09/09/24    Dear Mathew Bird,   My name is Nataliia Newman; I am a registered nurse care manager working with   [facility] [address] [address]  I have not been able to reach you and would like to set a time that I can talk with you over the phone.  My work is to help patients that have complex medical conditions get the care they need. This includes patients who may have been in the hospital or emergency room.    Please call me with any questions you may have. I look forward to speaking with you.  Sincerely,  Nataliia Newman BSN, RN  [phone]  Outpatient Care Manager

## 2024-09-09 NOTE — PROGRESS NOTES
Outpatient Care Management Note    IB reminder: HRR referral- second outreach attempt needed.  Chart reviewed.    Patient was hospitalized at [facility] 9/2/24-9/4/24 for acute exacerbation of COPD.    RN CM attempted outreach to patient on 9/5/24 with no patient answer.  Left VM message at that time.    RN CM second outreach attempt made now with no patient answer received. Left VM message and included RN CM contact information and request for return call.    UTR letter sent via patient's [employer]hart.    RN CM will continue to monitor for patient response to above noted outreach attempts.  RN CM will remove self from care team and close referral in two weeks if no patient response received.

## 2024-09-11 DIAGNOSIS — K92.2 GI BLEEDING: ICD-10-CM

## 2024-09-11 DIAGNOSIS — I25.10 CORONARY ARTERY DISEASE INVOLVING NATIVE CORONARY ARTERY OF NATIVE HEART WITHOUT ANGINA PECTORIS: ICD-10-CM

## 2024-09-11 DIAGNOSIS — R00.0 TACHYCARDIA: ICD-10-CM

## 2024-09-11 RX ORDER — ATORVASTATIN CALCIUM 40 MG/1
40 TABLET, FILM COATED ORAL
Qty: 30 TABLET | Refills: 0 | Status: SHIPPED | OUTPATIENT
Start: 2024-09-11

## 2024-09-11 RX ORDER — METOPROLOL SUCCINATE 25 MG/1
25 TABLET, EXTENDED RELEASE ORAL
Qty: 90 TABLET | Refills: 1 | Status: SHIPPED | OUTPATIENT
Start: 2024-09-11

## 2024-09-11 RX ORDER — PANTOPRAZOLE SODIUM 40 MG/1
40 TABLET, DELAYED RELEASE ORAL DAILY
Qty: 90 TABLET | Refills: 1 | Status: SHIPPED | OUTPATIENT
Start: 2024-09-11

## 2024-09-13 ENCOUNTER — OFFICE VISIT (OUTPATIENT)
Dept: FAMILY MEDICINE CLINIC | Facility: CLINIC | Age: 64
End: 2024-09-13
Payer: COMMERCIAL

## 2024-09-13 VITALS
DIASTOLIC BLOOD PRESSURE: 70 MMHG | HEART RATE: 81 BPM | RESPIRATION RATE: 17 BRPM | WEIGHT: 177 LBS | SYSTOLIC BLOOD PRESSURE: 120 MMHG | OXYGEN SATURATION: 91 % | HEIGHT: 66 IN | BODY MASS INDEX: 28.45 KG/M2 | TEMPERATURE: 98.3 F

## 2024-09-13 DIAGNOSIS — E44.0 MODERATE PROTEIN-CALORIE MALNUTRITION (HCC): ICD-10-CM

## 2024-09-13 DIAGNOSIS — J44.9 END STAGE COPD (HCC): ICD-10-CM

## 2024-09-13 DIAGNOSIS — I48.0 PAROXYSMAL ATRIAL FIBRILLATION (HCC): ICD-10-CM

## 2024-09-13 DIAGNOSIS — Z09 HOSPITAL DISCHARGE FOLLOW-UP: Primary | ICD-10-CM

## 2024-09-13 DIAGNOSIS — E11.65 TYPE 2 DIABETES MELLITUS WITH HYPERGLYCEMIA, WITHOUT LONG-TERM CURRENT USE OF INSULIN (HCC): ICD-10-CM

## 2024-09-13 DIAGNOSIS — J96.11 CHRONIC RESPIRATORY FAILURE WITH HYPOXIA (HCC): ICD-10-CM

## 2024-09-13 DIAGNOSIS — J44.9 STAGE 4 VERY SEVERE COPD BY GOLD CLASSIFICATION (HCC): ICD-10-CM

## 2024-09-13 DIAGNOSIS — N17.9 AKI (ACUTE KIDNEY INJURY) (HCC): ICD-10-CM

## 2024-09-13 DIAGNOSIS — K50.00 CROHN'S DISEASE OF SMALL INTESTINE WITHOUT COMPLICATION (HCC): ICD-10-CM

## 2024-09-13 DIAGNOSIS — J44.1 ACUTE EXACERBATION OF CHRONIC OBSTRUCTIVE PULMONARY DISEASE (COPD) (HCC): ICD-10-CM

## 2024-09-13 DIAGNOSIS — D53.9 MACROCYTIC ANEMIA: ICD-10-CM

## 2024-09-13 DIAGNOSIS — D50.8 OTHER IRON DEFICIENCY ANEMIA: ICD-10-CM

## 2024-09-13 PROBLEM — R73.03 PREDIABETES: Status: RESOLVED | Noted: 2023-04-25 | Resolved: 2024-09-13

## 2024-09-13 PROBLEM — I47.20 VT (VENTRICULAR TACHYCARDIA) (HCC): Status: RESOLVED | Noted: 2023-12-08 | Resolved: 2024-09-13

## 2024-09-13 PROBLEM — U07.1 COVID-19 VIRUS INFECTION: Status: RESOLVED | Noted: 2024-02-03 | Resolved: 2024-09-13

## 2024-09-13 PROBLEM — F10.10 ALCOHOL ABUSE: Status: RESOLVED | Noted: 2018-05-15 | Resolved: 2024-09-13

## 2024-09-13 PROBLEM — E11.9 TYPE 2 DIABETES MELLITUS, WITHOUT LONG-TERM CURRENT USE OF INSULIN (HCC): Status: ACTIVE | Noted: 2024-09-13

## 2024-09-13 PROBLEM — R78.81 POSITIVE BLOOD CULTURE: Status: RESOLVED | Noted: 2024-09-04 | Resolved: 2024-09-13

## 2024-09-13 PROCEDURE — 99496 TRANSJ CARE MGMT HIGH F2F 7D: CPT | Performed by: FAMILY MEDICINE

## 2024-09-13 RX ORDER — METFORMIN HYDROCHLORIDE 750 MG/1
750 TABLET, EXTENDED RELEASE ORAL
Qty: 100 TABLET | Refills: 3 | Status: SHIPPED | OUTPATIENT
Start: 2024-09-13

## 2024-09-13 NOTE — ASSESSMENT & PLAN NOTE
Lab Results   Component Value Date    HGBA1C 7.4 (H) 09/03/2024   Newly diagnosed while in the hospital  Will start metformin     Orders:    metFORMIN (GLUCOPHAGE-XR) 750 mg 24 hr tablet; Take 1 tablet (750 mg total) by mouth daily with breakfast    Albumin / creatinine urine ratio; Future    Comprehensive metabolic panel; Future    Hemoglobin A1C; Future    TSH, 3rd generation with Free T4 reflex; Future    Lipid Panel with Direct LDL reflex; Future

## 2024-09-13 NOTE — ASSESSMENT & PLAN NOTE
Stable on current meds  Hasn't seen GI recently   Given referral today        Orders:    Ambulatory Referral to Gastroenterology; Future

## 2024-09-13 NOTE — PROGRESS NOTES
Transition of Care Visit  Name: Mathew Bird      : 1960      MRN: [id number]  Encounter Provider: Mariola Doyle MD  Encounter Date: 2024   [facility]    Assessment & Plan  Crohn's disease of small intestine without complication (HCC)  Stable on current meds  Hasn't seen GI recently   Given referral today        Orders:    Ambulatory Referral to Gastroenterology; Future    Moderate protein-calorie malnutrition (HCC)  To eat more protein in his diet         DEBBY (acute kidney injury) ([facility])  Improving   Continue to monitor          Stage 4 very severe COPD by GOLD classification ([facility])  Continue Breztri and Trelegy   Currently on 4 L oxygen         Orders:    Ambulatory Referral to Pulmonology; Future    Acute exacerbation of chronic obstructive pulmonary disease (COPD) ([facility])    Orders:    Ambulatory Referral to Pulmonology; Future    Other iron deficiency anemia    Orders:    Ambulatory Referral to Gastroenterology; Future    Macrocytic anemia         Type 2 diabetes mellitus with hyperglycemia, without long-term current use of insulin ([facility])    Lab Results   Component Value Date    HGBA1C 7.4 (H) 2024   Newly diagnosed while in the hospital  Will start metformin     Orders:    metFORMIN (GLUCOPHAGE-XR) 750 mg 24 hr tablet; Take 1 tablet (750 mg total) by mouth daily with breakfast    Albumin / creatinine urine ratio; Future    Comprehensive metabolic panel; Future    Hemoglobin A1C; Future    TSH, 3rd generation with Free T4 reflex; Future    Lipid Panel with Direct LDL reflex; Future    Chronic respiratory failure with hypoxia (HCC)  On 4 L oxygen currently   He cannot wean off yet  To f/u with pulmonary          Hospital discharge follow-up         Paroxysmal atrial fibrillation (HCC)  Stable on current meds         End stage COPD (HCC)  Patient to f/u with pulmonary                 History of Present Illness     Transitional Care Management Review:    Mathew Bird is a 64 y.o. male here for TCM follow up.     During the TCM phone call patient stated:  TCM Call       Date and time call was made  9/5/2024  8:37 AM    Hospital care reviewed  Records reviewed    Patient was hospitialized at  [facility]    Date of Admission  09/02/24    Date of discharge  09/04/24    Diagnosis  Acute exacerbation of chronic obstructive pulmonary disease (COPD)    Disposition  Home    Were the patients medications reviewed and updated  No    Current Symptoms  None          TCM Call       Post hospital issues  None    Should patient be enrolled in anticoag monitoring?  Yes    Scheduled for follow up?  Yes    Did you obtain your prescribed medications  Yes    Do you need help managing your prescriptions or medications  No    Is transportation to your appointment needed  No    I have advised the patient to call PCP with any new or worsening symptoms  Olivia Gray,     Living Arrangements  Children    Are you recieving any outpatient services  No    Are you recieving home care services  No    Are you using any community resources  No    Current waiver services  No    Have you fallen in the last 12 months  No    Interperter language line needed  No    Counseling  Patient    Counseling topics  instructions for management; Importance of RX compliance          HPI  Here for hospital follow up  Was admitted for COPD exacerbation and acute on chronic respiratory failure  Was placed on BiPAP in ED and weaned off to Nasal canula  [place] IV steroids, antibiotics and neb Rx  Sent home with 4 L portable Oxygen and prednisone taper and antibiotics which he finished  Currently on 4 L oxygen at home at all time mostly when he is walking around  O2 sat 92-96% with 4L      Review of Systems   Constitutional:  Positive for fatigue.   Respiratory:  Positive for shortness of breath and wheezing.    Gastrointestinal: Negative.    Genitourinary: Negative.    Neurological: Negative.   "  Hematological: Negative.    Psychiatric/Behavioral: Negative.       Objective     /70 (BP Location: Left arm, Patient Position: Sitting, Cuff Size: Standard)   Pulse 81   Temp 98.3 °F (36.8 °C) (Tympanic)   Resp 17   Ht 5' 6\" (1.676 m)   Wt 80.3 kg (177 lb)   SpO2 91%   BMI 28.57 kg/m²     Physical Exam  Vitals and nursing note reviewed.   Constitutional:       Appearance: Normal appearance. He is ill-appearing.   Eyes:      Extraocular Movements: Extraocular movements intact.      Pupils: Pupils are equal, round, and reactive to light.   Cardiovascular:      Rate and Rhythm: Normal rate and regular rhythm.      Pulses: Normal pulses.      Heart sounds: Normal heart sounds.   Pulmonary:      Effort: No respiratory distress.      Breath sounds: Wheezing present.   Chest:      Chest wall: No tenderness.   Musculoskeletal:         General: No swelling or tenderness.   Neurological:      General: No focal deficit present.      Mental Status: He is alert.   Psychiatric:         Mood and Affect: Mood normal.         Behavior: Behavior normal.       Medications have been reviewed by provider in current encounter    Administrative Statements   I have spent a total time of 40 minutes in caring for this patient on the day of the visit/encounter including Diagnostic results, Risks and benefits of tx options, Importance of tx compliance, Counseling / Coordination of care, Documenting in the medical record, Reviewing / ordering tests, medicine, procedures  , and Obtaining or reviewing history  .      "

## 2024-09-13 NOTE — ASSESSMENT & PLAN NOTE
Continue Breztri and Trelegy   Currently on 4 L oxygen         Orders:    Ambulatory Referral to Pulmonology; Future

## 2024-09-17 ENCOUNTER — TELEPHONE (OUTPATIENT)
Dept: PULMONOLOGY | Facility: CLINIC | Age: 64
End: 2024-09-17

## 2024-09-17 NOTE — TELEPHONE ENCOUNTER
Called patient to schedule appointment for the 6 months (around 12/3/2024) from the Recall List and left a voicemail message.

## 2024-09-19 DIAGNOSIS — J44.9 STAGE 4 VERY SEVERE COPD BY GOLD CLASSIFICATION (HCC): Primary | ICD-10-CM

## 2024-09-19 DIAGNOSIS — I48.0 PAROXYSMAL ATRIAL FIBRILLATION (HCC): ICD-10-CM

## 2024-09-19 NOTE — PROGRESS NOTES
IM Residency Progress Note   Unit/Bed#: [facility] 505-01 Encounter: [id number]  SOD Team A      Marilyn Lynn 66 y o  male [id number]    Hospital Stay Days: 8      Assessment/Plan:    Principal Problem:    Intra-abdominal abscess [facility])  Active Problems:    Coronary artery disease involving native coronary artery of native heart    COPD (chronic obstructive pulmonary disease) ([facility] )    Alcohol abuse    Tobacco abuse    Alcohol dependence with withdrawal ([facility] )    Heart failure, systolic, due to idiopathic cardiomyopathy ([facility] )    Moderate protein-calorie malnutrition ([facility] )    Anemia    Hypotension    Crohn's disease  -Prior right hemicolectomy 2005 with subsequent transverse colon perforation status post EDITH, ileocolonic anastomosis, diverting ileostomy [id number]/3396 complicated by intra-abdominal abscess abscess status post IR drain placement  -Monitor ileostomy/SANTA drain output, IR following up today to remove SANTA drain  -Surgery is following  -the WBC 16 K today down from 18 K yesterday  -Per surgery SANTA drain can be removed with output below 30 mL with serous fluid  -As per patient, surgery is okay with removing the SANTA drain today  -IR placed a drain will need to do drain check referral  -Pain management-Tylenol, oxycodone p r n      Acute encephalopathy, resolved  -Likely secondary to EtOH withdrawal  -Patient is currently denying use of alcohol at all  -May be multifactorial with stay in ICU  -Currently awake alert and oriented x3  -Plan as below for ETOH withdrawal     Hypotension secondary to dehydration from ileostomy, resolved  -Patient was on Levophed drip in the ICU now on 50 mg q 24 solucortef  -Plan to dc solucortef tomorrow  -Last blood pressure 120/78, has been around 120/70  -Stable  -If BP climbs to 140s can consider restarting on home lisinopril and metoprolol     Alcohol withdrawal  -Serax dc'd  -Did not require p r n  meds overnight  -Daily folate and thiamine  -Monitor     Chronic anemia  -Hemoglobin 7 0 from 7 7  -Likely AOCD in setting of Crohn's  -Monitor CBC daily, transfuse for hemoglobin less than 7  -Consenting for transfusion today     History of paroxysmal Afib  -Stable  -Continue amiodarone 200 mg p o  daily  -Monitor     CAD, status post stent in RCA in   -Continue aspirin, Lipitor     COPD  -Currently on room air  -Breath sounds normal today  -Continue Spiriva, Xopenex, and albuterol p r n   -Airway clearance protocol  -Being seen by Respiratory     Tobacco abuse  -Patient counseled on tobacco cessation  -On nicotine patch     Protein calorie nutrition  -Last albumin 1 9  -Ensure drinks     Groin rash  -Nystatin ointment b i d  Disposition: Inpatient      Subjective:   Patient had no AOE, complains of abdominal pain       Vitals: Temp (24hrs), Av 3 °F (36 8 °C), Min:98 °F (36 7 °C), Max:98 6 °F (37 °C)  Current: Temperature: 98 3 °F (36 8 °C)  Vitals:    07/15/18 2300 18 0513 18 0730 18 0800   BP: 129/69   120/78   Pulse: 84  71 71   Resp: 18   20   Temp: 98 °F (36 7 °C)   98 3 °F (36 8 °C)   TempSrc: Oral   Oral   SpO2: 96%  97% 91%   Weight:  62 2 kg (137 lb 1 6 oz)     Height:        Body mass index is 22 13 kg/m²  I/O last 24 hours: In: 060 [P O :845; NG/GT:30]  Out: 2800 [Urine:1025; Drains:25; Stool:1750]      Physical Exam: General appearance: alert and oriented, in no acute distress  Head: Normocephalic, without obvious abnormality, atraumatic  Lungs: clear to auscultation bilaterally  Heart: regular rate and rhythm, S1, S2 normal, no murmur, click, rub or gallop  Abdomen: soft, non-distended, tender at site of abdominal wound, SANTA drain draining minimal serosanguinous fluid, ileostomy pink, patent, and productive    Wound draining purulent material   Extremities: extremities normal, warm and well-perfused; no cyanosis, clubbing, or edema  Pulses: 2+ and symmetric  Neurologic: Grossly normal     Invasive Devices     Peripheral Intravenous Line            Peripheral IV 07/13/18 Left;Upper Forearm 2 days          Drain            Colostomy  RUQ 54 days    Closed/Suction Drain Left Abdomen Bulb 8 5 Fr  18 days                          Labs:   Recent Results (from the past 24 hour(s))   Basic metabolic panel    Collection Time: 07/16/18  4:40 AM   Result Value Ref Range    Sodium 141 136 - 145 mmol/L    Potassium 4 1 3 5 - 5 3 mmol/L    Chloride 109 (H) 100 - 108 mmol/L    CO2 25 21 - 32 mmol/L    Anion Gap 7 4 - 13 mmol/L    BUN 17 5 - 25 mg/dL    Creatinine 0 88 0 60 - 1 30 mg/dL    Glucose 90 65 - 140 mg/dL    Calcium 8 2 (L) 8 3 - 10 1 mg/dL    eGFR 96 ml/min/1 73sq m   CBC    Collection Time: 07/16/18  5:52 AM   Result Value Ref Range    WBC 16 44 (H) 4 31 - 10 16 Thousand/uL    RBC 2 49 (L) 3 88 - 5 62 Million/uL    Hemoglobin 7 0 (L) 12 0 - 17 0 g/dL    Hematocrit 23 6 (L) 36 5 - 49 3 %    MCV 95 82 - 98 fL    MCH 28 1 26 8 - 34 3 pg    MCHC 29 7 (L) 31 4 - 37 4 g/dL    RDW 16 3 (H) 11 6 - 15 1 %    Platelets 546 941 - 849 Thousands/uL    MPV 9 6 8 9 - 12 7 fL       Radiology Results: I have personally reviewed pertinent reports  Other Diagnostic Testing:   I have personally reviewed pertinent reports          Active Meds:   Current Facility-Administered Medications   Medication Dose Route Frequency    acetaminophen (TYLENOL) tablet 650 mg  650 mg Oral Q6H PRN    albuterol inhalation solution 2 5 mg  2 5 mg Nebulization Q6H PRN    amiodarone tablet 200 mg  200 mg Oral Daily With Breakfast    atorvastatin (LIPITOR) tablet 40 mg  40 mg Oral Daily With Dinner    folic acid (FOLVITE) tablet 1 mg  1 mg Oral Daily    heparin (porcine) subcutaneous injection 5,000 Units  5,000 Units Subcutaneous Q8H [facility]    levalbuterol (XOPENEX) inhalation solution 1 25 mg  1 25 mg Nebulization TID    LORazepam (ATIVAN) 2 mg/mL injection 1 mg  1 mg Intravenous Q4H PRN    nicotine (NICODERM CQ) 7 mg/24hr TD 24 hr patch 7 mg  7 mg Transdermal Daily    nystatin (MYCOSTATIN) ointment Topical BID    ondansetron (ZOFRAN) injection 4 mg  4 mg Intravenous Q6H PRN    oxyCODONE (ROXICODONE) IR tablet 5 mg  5 mg Oral Q4H PRN    sodium chloride 0 9 % inhalation solution 3 mL  3 mL Nebulization TID    thiamine (VITAMIN B1) tablet 100 mg  100 mg Oral Daily    tiotropium (SPIRIVA) capsule for inhaler 18 mcg  18 mcg Inhalation Daily         VTE Pharmacologic Prophylaxis: Heparin  VTE Mechanical Prophylaxis: sequential compression device    Jessie Archibald MD Spine appears normal, range of motion is not limited, no muscle or joint tenderness Spine appears normal, ROM of R leg limited by pain. R hip & lateral thigh tender to palpation w/o overlying erythema or ecchymosis. No respiratory distress. No stridor, Lungs sounds clear with good aeration bilaterally.

## 2024-09-20 ENCOUNTER — PATIENT OUTREACH (OUTPATIENT)
Dept: CASE MANAGEMENT | Facility: OTHER | Age: 64
End: 2024-09-20

## 2024-09-20 ENCOUNTER — TELEPHONE (OUTPATIENT)
Age: 64
End: 2024-09-20

## 2024-09-20 ENCOUNTER — IMMUNIZATIONS (OUTPATIENT)
Dept: FAMILY MEDICINE CLINIC | Facility: CLINIC | Age: 64
End: 2024-09-20
Payer: COMMERCIAL

## 2024-09-20 DIAGNOSIS — Z23 ENCOUNTER FOR IMMUNIZATION: Primary | ICD-10-CM

## 2024-09-20 PROCEDURE — 90471 IMMUNIZATION ADMIN: CPT

## 2024-09-20 PROCEDURE — 90673 RIV3 VACCINE NO PRESERV IM: CPT

## 2024-09-20 NOTE — TELEPHONE ENCOUNTER
"Pt called to confirm he should be checking the \"permanent disability\" box on the handicap placard application Dr Doyle gave him. Please advise.     "

## 2024-09-20 NOTE — PROGRESS NOTES
Outpatient Care Management Note    PCP direct referral received.  Chart reviewed.    Patient had recent hospitalization at [facility] 9/2/24-9/4/24 for acute exacerbation of COPD.    RN CM initially received patient as a HRR referral following patient's recent hospital discharge.  Two outreach attempts to patient were made following patient's discharge, but RN AZUCENA was unable to reach patient.  RN CM left VM messages and included RN CM contact information and request for return call.  No patient response received.    An unable to reach letter was sent to patient via his [employer] on 9/9/24.  RN AZUCENA noted per chart review that patient has not viewed the UTR letter in his [employer].  HRR referral is scheduled to close on 9/23/24 if no patient response is received from patient prior to that date.    RN CM received a PCP direct referral today.  RN CM will make another telephone outreach attempt to patient.    RN CM placed call to number listed in patient's chart. No patient answer.  RN CM left another VM message and again included RN CM contact information and request for return call.    RN AZUCENA will continue to monitor for patient response to the above noted outreach attempts.  Noted per chart, patient is scheduled to see Pulmonology on 9/30/24.    RN CM will remove self from care team and close referral in one week if no patient response received prior.

## 2024-09-27 ENCOUNTER — PATIENT OUTREACH (OUTPATIENT)
Dept: CASE MANAGEMENT | Facility: OTHER | Age: 64
End: 2024-09-27

## 2024-09-27 NOTE — PROGRESS NOTES
"Outpatient Care Management Note      IB reminder: Care management outreach call needed.  Chart reviewed.    RN CM received patient as a HRR referral following recent hospitalization.  Patient was hospitalized at [facility] 9/2/24-9/4/24 for acute exacerbation of COPD.    RN CM has made several previous telephone outreach attempts and has been unable to reach patient. Attempting another call now with no answer, however during documentation, patient returned RN CM call.    Spoke with patient and introduced self, role and reason for call.  Patient is agreeable to care management.    Patient states that he has been \"doing pretty good\".  He states that he had a recent appointment with PCP on 9/13/24 and is scheduled to see Pulmonology on 9/30/24.    He confirms that he is wearing his oxygen at 4 liters nasal cannula. He reports that he gets short of breath with exertion, so he breaks up his activities so he is able to tolerate. He denies any shortness of breath at rest. He states that this is his baseline.  Patient is a smoker.  Reviewed oxygen safety with patient explained the oxygen is highly flammable and patient should not smoke with oxygen and be cautious around any open flame and if cooking- patient verbalized understanding.    Patient states that he has all of his medications and is taking as prescribed.  He Patient confirmed that he was started on Metformin for newly diagnosed diabetes.  He states that he is tolerating the medication.  Patient has all of his inhalers prescribed for his COPD. Patient denies any issues with medication affordability at this time.    Patient lives with his daughter and grandchildren and states that he has good family support.  He states that he is able to manage his own ADL's  and family will assist as needed.  Patient utilizes a rolling walker for ambulation as needed.  Patient is able to drive self to all appointments and states that daughter will also accompany.    Patient is " agreeable to further care management outreach and was provided with RN CM contact information.  RN CM will schedule another outreach in 2 weeks.

## 2024-09-30 ENCOUNTER — OFFICE VISIT (OUTPATIENT)
Dept: PULMONOLOGY | Facility: CLINIC | Age: 64
End: 2024-09-30
Payer: COMMERCIAL

## 2024-09-30 VITALS
HEART RATE: 75 BPM | SYSTOLIC BLOOD PRESSURE: 126 MMHG | TEMPERATURE: 98.3 F | DIASTOLIC BLOOD PRESSURE: 82 MMHG | OXYGEN SATURATION: 98 %

## 2024-09-30 DIAGNOSIS — J96.11 CHRONIC RESPIRATORY FAILURE WITH HYPOXIA (HCC): ICD-10-CM

## 2024-09-30 DIAGNOSIS — J44.9 STAGE 4 VERY SEVERE COPD BY GOLD CLASSIFICATION (HCC): Primary | ICD-10-CM

## 2024-09-30 DIAGNOSIS — R93.89 ABNORMAL CT OF THE CHEST: ICD-10-CM

## 2024-09-30 DIAGNOSIS — F17.210 CIGARETTE NICOTINE DEPENDENCE WITHOUT COMPLICATION: ICD-10-CM

## 2024-09-30 PROBLEM — J96.20 ACUTE ON CHRONIC RESPIRATORY FAILURE (HCC): Status: RESOLVED | Noted: 2024-09-02 | Resolved: 2024-09-30

## 2024-09-30 PROCEDURE — 94010 BREATHING CAPACITY TEST: CPT | Performed by: INTERNAL MEDICINE

## 2024-09-30 PROCEDURE — 99214 OFFICE O/P EST MOD 30 MIN: CPT | Performed by: INTERNAL MEDICINE

## 2024-09-30 RX ORDER — FLUTICASONE FUROATE, UMECLIDINIUM BROMIDE AND VILANTEROL TRIFENATATE 100; 62.5; 25 UG/1; UG/1; UG/1
1 POWDER RESPIRATORY (INHALATION) DAILY
Qty: 60 BLISTER | Refills: 3 | Status: CANCELLED | OUTPATIENT
Start: 2024-09-30 | End: 2025-01-28

## 2024-09-30 RX ORDER — FLUTICASONE FUROATE, UMECLIDINIUM BROMIDE AND VILANTEROL TRIFENATATE 200; 62.5; 25 UG/1; UG/1; UG/1
1 POWDER RESPIRATORY (INHALATION) DAILY
Qty: 60 BLISTER | Refills: 0 | Status: SHIPPED | OUTPATIENT
Start: 2024-09-30 | End: 2024-10-30

## 2024-09-30 RX ORDER — BUDESONIDE AND FORMOTEROL FUMARATE DIHYDRATE 160; 4.5 UG/1; UG/1
2 AEROSOL RESPIRATORY (INHALATION) DAILY PRN
Qty: 10.2 G | Refills: 0 | Status: SHIPPED | OUTPATIENT
Start: 2024-09-30

## 2024-09-30 RX ORDER — IPRATROPIUM BROMIDE AND ALBUTEROL SULFATE 2.5; .5 MG/3ML; MG/3ML
3 SOLUTION RESPIRATORY (INHALATION) 4 TIMES DAILY
Qty: 180 ML | Refills: 2 | Status: SHIPPED | OUTPATIENT
Start: 2024-09-30

## 2024-09-30 RX ORDER — FLUTICASONE FUROATE, UMECLIDINIUM BROMIDE AND VILANTEROL TRIFENATATE 100; 62.5; 25 UG/1; UG/1; UG/1
1 POWDER RESPIRATORY (INHALATION) DAILY
Qty: 56 BLISTER | Refills: 0 | Status: SHIPPED | COMMUNITY
Start: 2024-09-30 | End: 2024-12-29

## 2024-09-30 NOTE — ASSESSMENT & PLAN NOTE
With 2 hospitalizations in the last year, recently completed steroid taper. Stable on 4 L NC.   Still having productive cough, feels closer to baseline today. Feels most benefit with Trelegy and Symbicort.     In-office Spirometry showed : FVC 2.51 (63%), FEV1 0.69 (22%), FEV1/FVC: 27%    Provided Trelegy sample, ordered Trelegy to pharmacy to assess cost and if feasible will continue this long term. Continue Symbicort as a rescue inhaler as he feels relief from dyspnea when using it. Duo-nebs in place of Albuterol nebulizers, will schedule duo-nebs 4x daily. Counseled on smoking cessation, currently 3-4 cigars per day.

## 2024-09-30 NOTE — PROGRESS NOTES
Pulmonary Follow Up Note   Mathew Bird 64 y.o. male MRN: [id number]  9/30/2024      Assessment/Plan:    Stage 4 very severe COPD by GOLD classification ([facility])  With 2 hospitalizations in the last year, recently completed steroid taper. Stable on 4 L NC.   Still having productive cough, feels closer to baseline today. Feels most benefit with Trelegy and Symbicort.     In-office Spirometry showed : FVC 2.51 (63%), FEV1 0.69 (22%), FEV1/FVC: 27%    Provided Trelegy sample, ordered Trelegy to pharmacy to assess cost and if feasible will continue this long term. Continue Symbicort as a rescue inhaler as he feels relief from dyspnea when using it. Duo-nebs in place of Albuterol nebulizers, will schedule duo-nebs 4x daily. Counseled on smoking cessation, currently 3-4 cigars per day.     Chronic respiratory failure with hypoxia (HCC)  Stable on 4 L NC. Continue supplemental oxygen.       Orders:    Diagnoses and all orders for this visit:    Stage 4 very severe COPD by GOLD classification ([facility])  -     POCT spirometry  -     ipratropium-albuterol (DUO-NEB) 0.5-2.5 mg/3 mL nebulizer solution; Take 3 mL by nebulization 4 (four) times a day  -     budesonide-formoterol (Symbicort) 160-4.5 mcg/act inhaler; Inhale 2 puffs daily as needed (for shortness of breath) Rinse mouth after use.  -     fluticasone-umeclidinium-vilanterol (Trelegy Ellipta) 200-62.5-25 mcg/actuation AEPB inhaler; Inhale 1 puff daily Rinse mouth after use.  -     fluticasone-umeclidinium-vilanterol (Trelegy Ellipta) 100-62.5-25 mcg/actuation inhaler; Inhale 1 puff daily Rinse mouth after use.    COPD with acute exacerbation (HCC)    Chronic respiratory failure with hypoxia (HCC)        Return in about 4 weeks (around 10/28/2024) for Follow up COPD.    History of Present Illness   HPI:  Mathew Bird is a 64 y.o. male who presents to pulmonary clinic after hospitalization for COPD exacerbation.  He completed antibiotics and a steroid taper and says he  is feeling close to his baseline.  He still requiring 4 L of NC oxygen with minimal exertion. He is still having a productive cough but denies any hemoptysis. He is not having any new fevers, chills, weight loss or night sweats.     He says that the Trelegy  is the most helpful but he is still needing to take his albuterol nebulizer and inhalers multiple times throughout the day. He does not feel as much relief from the Breztri. He does find the symbicort helpful in controlling his symptoms.     In-office spirometry performed with severe COPD and FEV1/FVC of 30%.     Discussed plan of resuming Trelegy with scheduled duo-neb nebulizer treatments throughout the day and utilizing Symbicort as a rescue therapy. Will follow up in 4 weeks to assess for improvement in symptoms and assess rescue inhaler usage.     He is still smoking cigars daily. Discussed cessation and he is not ready to quit yet though he is aware of different strategies to help with quitting. He has patches at home but doesn't want to start them yet. Advised him that given his severe lung disease he is at high risk for continued deterioration as long as he continues to smoke.       Review of Systems   Constitutional:  Positive for fatigue. Negative for appetite change, chills, fever and unexpected weight change.   HENT:  Negative for postnasal drip, rhinorrhea and sore throat.    Respiratory:  Positive for cough, shortness of breath and wheezing. Negative for choking, chest tightness and stridor.    Cardiovascular:  Negative for chest pain and palpitations.   Gastrointestinal:  Negative for nausea and vomiting.   Hematological:  Negative for adenopathy.       Historical Information   Past Medical History:   Diagnosis Date    Alcohol abuse 05/15/2018    Anxiety     Asthma     Cardiac disease     Cervical stenosis (uterine cervix)     Chest pain     Chronic kidney disease     Colitis     Colon polyps     COPD (chronic obstructive pulmonary disease) (HCC)      2L @ HS and PRN    Coronary artery disease     Diverticulitis     Esophageal reflux     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HCC)     Heart failure, systolic, due to idiopathic cardiomyopathy (HCC) 05/21/2018    Hyperlipidemia     Hypertension     IBD (inflammatory bowel disease)     Myocardial infarction (HCC)     Old myocardial infarction     Paroxysmal atrial fibrillation (HCC) 08/24/2018    Perforation of colon (HCC)     Pericarditis 12/07/2016    Type 2 diabetes, diet controlled (HCC)     Ulcerative colitis (HCC)     VT (ventricular tachycardia) (HCC) 12/08/2023     Past Surgical History:   Procedure Laterality Date    ANGIOPLASTY      APPENDECTOMY      COLON SURGERY      COLONOSCOPY N/A 10/24/2016    Procedure: COLONOSCOPY;  Surgeon: Tirso Juárez MD;  Location: [facility];  Service:     CORONARY ANGIOPLASTY WITH STENT PLACEMENT      ESOPHAGOGASTRODUODENOSCOPY N/A 10/24/2016    Procedure: ESOPHAGOGASTRODUODENOSCOPY (EGD);  Surgeon: Tirso Juárez MD;  Location: [facility];  Service:     EXPLORATORY LAPAROTOMY W/ BOWEL RESECTION N/A 5/22/2018    Procedure: EXPLORATORY LAPAROTOMY, ILIOCOLECTOMY, ILIOCOLONIC ANASTAMOSIS, LOOP ILIOSTOMY, REPAIR OF SEROSAL TEAR, EXTENSIVE LYSIS OF ADHESIONS, WOUND VAC PLACEMENT;  Surgeon: Tirso Juárez MD;  Location: [facility];  Service: Colorectal    HEMICOLECTOMY      ILEOSTOMY CLOSURE N/A 10/5/2018    Procedure: CLOSURE ILEOSTOMY;  Surgeon: Tirso Juárez MD;  Location: [facility];  Service: Colorectal    OTHER SURGICAL HISTORY      stent indications acute myocardial infarction    DC COLONOSCOPY FLX DX W/COLLJ SPEC WHEN PFRMD N/A 2/6/2018    Procedure: COLONOSCOPY;  Surgeon: Tirso Juárez MD;  Location: [facility];  Service: Colorectal    DC COLONOSCOPY FLX DX W/COLLJ SPEC WHEN PFRMD N/A 10/4/2018    Procedure: COLONOSCOPY;  Surgeon: Tirso Juárez MD;  Location: [facility];  Service: Colorectal    TONSILLECTOMY       Family History   Problem Relation Age of Onset     Coronary artery disease Father     Crohn's disease Father     Stroke Father     Diabetes Family          Meds/Allergies     Current Outpatient Medications:     albuterol (2.5 mg/3 mL) 0.083 % nebulizer solution, Take 3 mL (2.5 mg total) by nebulization every 4 (four) hours as needed for wheezing or shortness of breath, Disp: 375 mL, Rfl: 5    albuterol (PROVENTIL HFA,VENTOLIN HFA) 90 mcg/act inhaler, Inhale 2 puffs every 4 (four) hours as needed for wheezing or shortness of breath, Disp: 18 g, Rfl: 0    aspirin ([employer] Aspirin Adult Low Dose) 81 mg chewable tablet, CHEW 1 TABLET BY MOUTH DAILY, Disp: 90 tablet, Rfl: 1    atorvastatin (LIPITOR) 40 mg tablet, TAKE 1 TABLET BY MOUTH EVERY DAY WITH DINNER, Disp: 30 tablet, Rfl: 0    budesonide-formoterol (Symbicort) 160-4.5 mcg/act inhaler, Inhale 2 puffs daily as needed (for shortness of breath) Rinse mouth after use., Disp: 10.2 g, Rfl: 0    fluticasone-umeclidinium-vilanterol (Trelegy Ellipta) 100-62.5-25 mcg/actuation inhaler, Inhale 1 puff daily Rinse mouth after use., Disp: 56 blister, Rfl: 0    fluticasone-umeclidinium-vilanterol (Trelegy Ellipta) 200-62.5-25 mcg/actuation AEPB inhaler, Inhale 1 puff daily Rinse mouth after use., Disp: 60 blister, Rfl: 0    ipratropium-albuterol (DUO-NEB) 0.5-2.5 mg/3 mL nebulizer solution, Take 3 mL by nebulization 4 (four) times a day, Disp: 180 mL, Rfl: 2    metFORMIN (GLUCOPHAGE-XR) 750 mg 24 hr tablet, Take 1 tablet (750 mg total) by mouth daily with breakfast, Disp: 100 tablet, Rfl: 3    metoprolol succinate (TOPROL-XL) 25 mg 24 hr tablet, TAKE 1 TABLET (25 MG TOTAL) BY MOUTH DAILY AT BEDTIME DAILY AT BEDTIME, Disp: 90 tablet, Rfl: 1    Multiple Vitamins-Minerals (Centrum Silver 50+Men) TABS, Take by mouth, Disp: , Rfl:     Nebulizers (AERONEB GO NEBULIZER HANDSET) MISC, by Does not apply route 2 (two) times a day as needed (wheezing), Disp: 1 each, Rfl: 0    nicotine (NICODERM CQ) 21 mg/24 hr TD 24 hr patch, Place 1 patch  "on the skin over 24 hours daily, Disp: 28 patch, Rfl: 0    pantoprazole (PROTONIX) 40 mg tablet, TAKE 1 TABLET BY MOUTH EVERY DAY, Disp: 90 tablet, Rfl: 1    sulfaSALAzine (AZULFIDINE) 500 mg tablet, Take 1 tablet (500 mg total) by mouth 4 (four) times a day, Disp: 360 tablet, Rfl: 0  Allergies   Allergen Reactions    Medical Tape     Other      Brown cloth band aids       Latex Rash       Vitals: Blood pressure 126/82, pulse 75, temperature 98.3 °F (36.8 °C), temperature source Tympanic, SpO2 98%. There is no height or weight on file to calculate BMI. Oxygen Therapy  SpO2: 98 %  Oxygen Therapy: Supplemental oxygen  O2 Delivery Method: Nasal cannula  O2 Flow Rate (L/min): 4 L/min      Physical Exam  Physical Exam  Vitals reviewed.   Constitutional:       General: He is not in acute distress.     Appearance: He is not toxic-appearing.   Cardiovascular:      Rate and Rhythm: Normal rate and regular rhythm.      Heart sounds: No murmur heard.  Pulmonary:      Effort: Pulmonary effort is normal. No respiratory distress.      Breath sounds: No wheezing.      Comments: Severely diminished breath sounds throughout   Musculoskeletal:      Right lower leg: No edema.      Left lower leg: No edema.   Lymphadenopathy:      Cervical: No cervical adenopathy.   Neurological:      Mental Status: He is alert.         Labs: I have personally reviewed pertinent lab results.  Lab Results   Component Value Date    WBC 10.03 09/04/2024    HGB 10.2 (L) 09/04/2024    HCT 34.4 (L) 09/04/2024    MCV 82 09/04/2024     (L) 09/04/2024     Lab Results   Component Value Date    GLUCOSE 92 07/08/2018    CALCIUM 7.4 (L) 09/04/2024     05/29/2015    K 4.5 09/04/2024    CO2 27 09/04/2024     09/04/2024    BUN 24 09/04/2024    CREATININE 1.34 (H) 09/04/2024     No results found for: \"IGE\"  Lab Results   Component Value Date    ALT 27 09/02/2024    AST 25 09/02/2024    ALKPHOS 95 09/02/2024    BILITOT 0.58 05/21/2015       Imaging " and other studies: Personally reviewed the following image studies in PACS and associated radiology reports: CT chest. My interpretation of the radiology images/reports is: scattered emphysematous changes with basilar atelectasis and scarring in LLL.    Pulmonary function testing: Spirometry updated today. Formal PFT's pending. Last Spirometry in 2020 with very severe obstruction as well.     EKG, Pathology, and Other Studies: Reviewed radiology reports from this admission including: Echocardiogram.      Devang King M.D.  Pulmonary & Critical Care Fellow, PGY-IV

## 2024-10-02 ENCOUNTER — TELEPHONE (OUTPATIENT)
Age: 64
End: 2024-10-02

## 2024-10-02 NOTE — TELEPHONE ENCOUNTER
[facility] calling to confirm that pt should be using both Trelegy and Symbicort. Confirmed per provider note that pt is using Trelegy daily and Symbicort as needed.

## 2024-10-04 ENCOUNTER — HOSPITAL ENCOUNTER (EMERGENCY)
Facility: HOSPITAL | Age: 64
Discharge: HOME | End: 2024-10-04
Attending: EMERGENCY MEDICINE
Payer: COMMERCIAL

## 2024-10-04 ENCOUNTER — APPOINTMENT (EMERGENCY)
Dept: RADIOLOGY | Facility: HOSPITAL | Age: 64
End: 2024-10-04
Payer: COMMERCIAL

## 2024-10-04 ENCOUNTER — PATIENT OUTREACH (OUTPATIENT)
Dept: CASE MANAGEMENT | Facility: OTHER | Age: 64
End: 2024-10-04

## 2024-10-04 VITALS
HEART RATE: 77 BPM | RESPIRATION RATE: 22 BRPM | SYSTOLIC BLOOD PRESSURE: 134 MMHG | TEMPERATURE: 97.5 F | OXYGEN SATURATION: 100 % | DIASTOLIC BLOOD PRESSURE: 62 MMHG

## 2024-10-04 DIAGNOSIS — J44.1 COPD EXACERBATION (HCC): Primary | ICD-10-CM

## 2024-10-04 LAB
ANION GAP SERPL CALCULATED.3IONS-SCNC: 7 MMOL/L (ref 4–13)
ATRIAL RATE: 84 BPM
BASOPHILS # BLD AUTO: 0.05 THOUSANDS/ΜL (ref 0–0.1)
BASOPHILS NFR BLD AUTO: 0 % (ref 0–1)
BUN SERPL-MCNC: 18 MG/DL (ref 5–25)
CALCIUM SERPL-MCNC: 8.5 MG/DL (ref 8.4–10.2)
CHLORIDE SERPL-SCNC: 111 MMOL/L (ref 96–108)
CO2 SERPL-SCNC: 25 MMOL/L (ref 21–32)
CREAT SERPL-MCNC: 1.42 MG/DL (ref 0.6–1.3)
EOSINOPHIL # BLD AUTO: 0.46 THOUSAND/ΜL (ref 0–0.61)
EOSINOPHIL NFR BLD AUTO: 4 % (ref 0–6)
ERYTHROCYTE [DISTWIDTH] IN BLOOD BY AUTOMATED COUNT: 19 % (ref 11.6–15.1)
GFR SERPL CREATININE-BSD FRML MDRD: 52 ML/MIN/1.73SQ M
GLUCOSE SERPL-MCNC: 150 MG/DL (ref 65–140)
HCT VFR BLD AUTO: 39.1 % (ref 36.5–49.3)
HGB BLD-MCNC: 11.9 G/DL (ref 12–17)
IMM GRANULOCYTES # BLD AUTO: 0.08 THOUSAND/UL (ref 0–0.2)
IMM GRANULOCYTES NFR BLD AUTO: 1 % (ref 0–2)
LYMPHOCYTES # BLD AUTO: 2.72 THOUSANDS/ΜL (ref 0.6–4.47)
LYMPHOCYTES NFR BLD AUTO: 24 % (ref 14–44)
MCH RBC QN AUTO: 25 PG (ref 26.8–34.3)
MCHC RBC AUTO-ENTMCNC: 30.4 G/DL (ref 31.4–37.4)
MCV RBC AUTO: 82 FL (ref 82–98)
MONOCYTES # BLD AUTO: 1.18 THOUSAND/ΜL (ref 0.17–1.22)
MONOCYTES NFR BLD AUTO: 11 % (ref 4–12)
NEUTROPHILS # BLD AUTO: 6.72 THOUSANDS/ΜL (ref 1.85–7.62)
NEUTS SEG NFR BLD AUTO: 60 % (ref 43–75)
NRBC BLD AUTO-RTO: 0 /100 WBCS
P AXIS: 91 DEGREES
PLATELET # BLD AUTO: 181 THOUSANDS/UL (ref 149–390)
PMV BLD AUTO: 10.3 FL (ref 8.9–12.7)
POTASSIUM SERPL-SCNC: 3.9 MMOL/L (ref 3.5–5.3)
PR INTERVAL: 126 MS
QRS AXIS: 119 DEGREES
QRSD INTERVAL: 114 MS
QT INTERVAL: 410 MS
QTC INTERVAL: 484 MS
RBC # BLD AUTO: 4.76 MILLION/UL (ref 3.88–5.62)
SODIUM SERPL-SCNC: 143 MMOL/L (ref 135–147)
T WAVE AXIS: 75 DEGREES
VENTRICULAR RATE: 84 BPM
WBC # BLD AUTO: 11.21 THOUSAND/UL (ref 4.31–10.16)

## 2024-10-04 PROCEDURE — 99291 CRITICAL CARE FIRST HOUR: CPT | Performed by: EMERGENCY MEDICINE

## 2024-10-04 PROCEDURE — 93010 ELECTROCARDIOGRAM REPORT: CPT | Performed by: INTERNAL MEDICINE

## 2024-10-04 PROCEDURE — 71045 X-RAY EXAM CHEST 1 VIEW: CPT

## 2024-10-04 PROCEDURE — 36415 COLL VENOUS BLD VENIPUNCTURE: CPT

## 2024-10-04 PROCEDURE — 93005 ELECTROCARDIOGRAM TRACING: CPT

## 2024-10-04 PROCEDURE — 99285 EMERGENCY DEPT VISIT HI MDM: CPT

## 2024-10-04 PROCEDURE — 85025 COMPLETE CBC W/AUTO DIFF WBC: CPT

## 2024-10-04 PROCEDURE — 80048 BASIC METABOLIC PNL TOTAL CA: CPT

## 2024-10-04 PROCEDURE — 94644 CONT INHLJ TX 1ST HOUR: CPT

## 2024-10-04 RX ORDER — METHYLPREDNISOLONE SOD SUCC 125 MG
1 VIAL (EA) INJECTION ONCE
Status: COMPLETED | OUTPATIENT
Start: 2024-10-04 | End: 2024-10-04

## 2024-10-04 RX ORDER — ALBUTEROL SULFATE 5 MG/ML
10 SOLUTION RESPIRATORY (INHALATION) ONCE
Status: COMPLETED | OUTPATIENT
Start: 2024-10-04 | End: 2024-10-04

## 2024-10-04 RX ORDER — MAGNESIUM SULFATE HEPTAHYDRATE 40 MG/ML
1 INJECTION, SOLUTION INTRAVENOUS ONCE
Status: COMPLETED | OUTPATIENT
Start: 2024-10-04 | End: 2024-10-04

## 2024-10-04 RX ORDER — IPRATROPIUM BROMIDE AND ALBUTEROL SULFATE .5; 3 MG/3ML; MG/3ML
1 SOLUTION RESPIRATORY (INHALATION) ONCE
Status: COMPLETED | OUTPATIENT
Start: 2024-10-04 | End: 2024-10-04

## 2024-10-04 RX ORDER — SODIUM CHLORIDE FOR INHALATION 0.9 %
12 VIAL, NEBULIZER (ML) INHALATION ONCE
Status: COMPLETED | OUTPATIENT
Start: 2024-10-04 | End: 2024-10-04

## 2024-10-04 RX ORDER — ALBUTEROL SULFATE 2.5 MG/3ML
2 SOLUTION RESPIRATORY (INHALATION) ONCE
Status: COMPLETED | OUTPATIENT
Start: 2024-10-04 | End: 2024-10-04

## 2024-10-04 RX ORDER — PREDNISONE 20 MG/1
40 TABLET ORAL DAILY
Qty: 8 TABLET | Refills: 0 | Status: SHIPPED | OUTPATIENT
Start: 2024-10-04 | End: 2024-10-08

## 2024-10-04 RX ADMIN — ALBUTEROL SULFATE 10 MG: 2.5 SOLUTION RESPIRATORY (INHALATION) at 04:23

## 2024-10-04 RX ADMIN — IPRATROPIUM BROMIDE 1 MG: 0.5 SOLUTION RESPIRATORY (INHALATION) at 04:23

## 2024-10-04 RX ADMIN — ISODIUM CHLORIDE 12 ML: 0.03 SOLUTION RESPIRATORY (INHALATION) at 04:22

## 2024-10-04 NOTE — DISCHARGE INSTRUCTIONS
You are having a COPD exacerbation.  I will be giving you a short course of prednisone.  You should take this as prescribed until the prescription is finished.  You should also continue to use your inhalers as prescribed.  You should follow-up with your primary care doctor in about 1 week to be reassessed.  You should return to the emergency room if you cannot catch your breath even at rest, if you have severe chest pain, or if you feel like you are going to pass out.

## 2024-10-04 NOTE — ED ATTENDING ATTESTATION
10/4/2024  IVinayak MD, saw and evaluated the patient. I have discussed the patient with the resident/non-physician practitioner and agree with the resident's/non-physician practitioner's findings, Plan of Care, and MDM as documented in the resident's/non-physician practitioner's note, except where noted. All available labs and Radiology studies were reviewed.  I was present for key portions of any procedure(s) performed by the resident/non-physician practitioner and I was immediately available to provide assistance.       At this point I agree with the current assessment done in the Emergency Department.  I have conducted an independent evaluation of this patient a history and physical is as follows:      Final Diagnosis:  1. COPD exacerbation (HCC)      Chief Complaint   Patient presents with    Respiratory Distress     Patient coming from home, called EMS for SOB an hour ago, EMS found patient in tripod position, given breathing tx and mag nesium by EMS, patient arrived on bipap, hx COPD normally of 4L prn           A:  -64-year-old male who presents in respiratory distress.      P:  Presentation consistent with COPD/asthma exacerbation.  - We will do IVF for insensible losses  - Will do IV steroids if EMS has not provided it; solumedrol 125mg  - We will do aggressive beta-agonist therapy, specifically 1 hour JIMENEZ neb   - CXR for PNA  - CBC to evaluate for evidence of anemia or infection as the cause of dyspnea.  Metabolic panel to evaluate for electrolyte abnormalities in the case arrhythmia is the cause of dyspnea.  Also, to evaluate renal function in the setting of insensible losses.  - EKG to evaluate for arrhythmia as the cause of dyspnia  - Maintain bipap for work of breathing.  - Will continue to monitor patient.   - Re-assess --> disposition based on improvement. If still wheezing, tachypneic, or tachycardic, or appears ill and unable to do baseline activities, will admit.         H:    64-year-old male with history of respiratory failure on 4 L nasal cannula baseline who presents with shortness of breath.  Started approximately 1 hour ago.  Called EMS.  Given IV magnesium, Solu-Medrol, breathing treatment by EMS.  Placed on CPAP.  Here, patient placed on BiPAP.      PMH:  Past Medical History:   Diagnosis Date    Alcohol abuse 05/15/2018    Anxiety     Asthma     Cardiac disease     Cervical stenosis (uterine cervix)     Chest pain     Chronic kidney disease     Colitis     Colon polyps     COPD (chronic obstructive pulmonary disease) ([facility])     2L @ HS and PRN    Coronary artery disease     Diverticulitis     Esophageal reflux     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HCC)     Heart failure, systolic, due to idiopathic cardiomyopathy ([facility]) 05/21/2018    Hyperlipidemia     Hypertension     IBD (inflammatory bowel disease)     Myocardial infarction ([facility])     Old myocardial infarction     Paroxysmal atrial fibrillation ([facility]) 08/24/2018    Perforation of colon ([facility])     Pericarditis 12/07/2016    Type 2 diabetes, diet controlled ([facility])     Ulcerative colitis ([facility])     VT (ventricular tachycardia) ([facility]) 12/08/2023       PSH:  Past Surgical History:   Procedure Laterality Date    ANGIOPLASTY      APPENDECTOMY      COLON SURGERY      COLONOSCOPY N/A 10/24/2016    Procedure: COLONOSCOPY;  Surgeon: Tirso Juárez MD;  Location: [facility];  Service:     CORONARY ANGIOPLASTY WITH STENT PLACEMENT      ESOPHAGOGASTRODUODENOSCOPY N/A 10/24/2016    Procedure: ESOPHAGOGASTRODUODENOSCOPY (EGD);  Surgeon: Tirso Juárez MD;  Location: [facility];  Service:     EXPLORATORY LAPAROTOMY W/ BOWEL RESECTION N/A 5/22/2018    Procedure: EXPLORATORY LAPAROTOMY, ILIOCOLECTOMY, ILIOCOLONIC ANASTAMOSIS, LOOP ILIOSTOMY, REPAIR OF SEROSAL TEAR, EXTENSIVE LYSIS OF ADHESIONS, WOUND VAC PLACEMENT;  Surgeon: Tirso Juárez MD;  Location: [facility];  Service: Colorectal    HEMICOLECTOMY      ILEOSTOMY CLOSURE N/A 10/5/2018     Procedure: CLOSURE ILEOSTOMY;  Surgeon: Tirso Juárez MD;  Location: [facility];  Service: Colorectal    OTHER SURGICAL HISTORY      stent indications acute myocardial infarction    LA COLONOSCOPY FLX DX W/COLLJ SPEC WHEN PFRMD N/A 2/6/2018    Procedure: COLONOSCOPY;  Surgeon: Tirso Juárez MD;  Location: [facility];  Service: Colorectal    LA COLONOSCOPY FLX DX W/COLLJ SPEC WHEN PFRMD N/A 10/4/2018    Procedure: COLONOSCOPY;  Surgeon: Tirso Juárez MD;  Location: [facility];  Service: Colorectal    TONSILLECTOMY           PE:   Vitals:    10/04/24 0408 10/04/24 0430   BP: 121/58 134/62   BP Location: Right arm Right arm   Pulse: 91 77   Resp: 22 22   Temp: 97.5 °F (36.4 °C)    TempSrc: Oral    SpO2: 98% 100%         Constitutional: Chronically ill-appearing.  No acute distress..   Cardiovascular: Normal rate, regular rhythm, normal heart sounds.   No murmur heard.  Pulmonary/Chest: Tachypneic, respiratory distress.   Abdominal: Soft. Normal appearance and bowel sounds are normal. There is no tenderness. There is no rebound, no guarding.  Large ventral hernia.  Neurological: He is alert.  Skin: Skin is warm, dry and intact.           - 13 point ROS was performed and all are normal unless stated in the history above.   - Nursing note reviewed. Vitals reviewed.   - Orders placed by myself and/or advanced practitioner / resident.    - Previous chart was reviewed  - No language barrier.   - History obtained from patient.   - There are no limitations to the history obtained. Reasons ROS could not be obtained:  N/A      Critical Care Time Statement: Upon my evaluation, this patient had a high probability of imminent or life-threatening deterioration due to respiratory failure, which required my direct attention, intervention, and personal management.  I spent a total of 50 minutes directly providing critical care services, including interpretation of complex medical databases, evaluating for the presence  of life-threatening injuries or illnesses, management of organ system failure(s) , and complex medical decision making (to support/prevent further life-threatening deterioration).. This time is exclusive of procedures, teaching, family meetings, treating other patients, and any prior time recorded by providers other than myself.                 Medications   albuterol (FOR EMS ONLY) (2.5 mg/3 mL) 0.083 % inhalation solution 5 mg (0 mg Does not apply Given to EMS 10/4/24 0419)   ipratropium-albuterol (FOR EMS ONLY) (DUO-NEB) 0.5-2.5 mg/3 mL inhalation solution 3 mL (0 mL Does not apply Given to EMS 10/4/24 0419)   magnesium sulfate (FOR EMS ONLY) 2 g/50 mL IVPB (premix) 2 g (0 g Does not apply Given to EMS 10/4/24 0419)   methylPREDNISolone sodium succinate (FOR EMS ONLY) (Solu-MEDROL) 125 MG injection 125 mg (0 mg Does not apply Given to EMS 10/4/24 0419)   albuterol inhalation solution 10 mg (10 mg Nebulization Given 10/4/24 0423)   ipratropium (ATROVENT) 0.02 % inhalation solution 1 mg (1 mg Nebulization Given 10/4/24 0423)   sodium chloride 0.9 % inhalation solution 12 mL (12 mL Nebulization Given 10/4/24 0422)     XR chest 1 view portable   ED Interpretation   Small left pleural effusion but otherwise no focal consolidations or pneumothorax.        Orders Placed This Encounter   Procedures    XR chest 1 view portable    CBC and differential    Basic metabolic panel    ECG 12 lead     Labs Reviewed   CBC AND DIFFERENTIAL - Abnormal       Result Value Ref Range Status    WBC 11.21 (*) 4.31 - 10.16 Thousand/uL Final    RBC 4.76  3.88 - 5.62 Million/uL Final    Hemoglobin 11.9 (*) 12.0 - 17.0 g/dL Final    Hematocrit 39.1  36.5 - 49.3 % Final    MCV 82  82 - 98 fL Final    MCH 25.0 (*) 26.8 - 34.3 pg Final    MCHC 30.4 (*) 31.4 - 37.4 g/dL Final    RDW 19.0 (*) 11.6 - 15.1 % Final    MPV 10.3  8.9 - 12.7 fL Final    Platelets 181  149 - 390 Thousands/uL Final    nRBC 0  /100 WBCs Final    Segmented % 60  43 - 75 %  Final    Immature Grans % 1  0 - 2 % Final    Lymphocytes % 24  14 - 44 % Final    Monocytes % 11  4 - 12 % Final    Eosinophils Relative 4  0 - 6 % Final    Basophils Relative 0  0 - 1 % Final    Absolute Neutrophils 6.72  1.85 - 7.62 Thousands/µL Final    Absolute Immature Grans 0.08  0.00 - 0.20 Thousand/uL Final    Absolute Lymphocytes 2.72  0.60 - 4.47 Thousands/µL Final    Absolute Monocytes 1.18  0.17 - 1.22 Thousand/µL Final    Eosinophils Absolute 0.46  0.00 - 0.61 Thousand/µL Final    Basophils Absolute 0.05  0.00 - 0.10 Thousands/µL Final   BASIC METABOLIC PANEL - Abnormal    Sodium 143  135 - 147 mmol/L Final    Potassium 3.9  3.5 - 5.3 mmol/L Final    Chloride 111 (*) 96 - 108 mmol/L Final    CO2 25  21 - 32 mmol/L Final    ANION GAP 7  4 - 13 mmol/L Final    BUN 18  5 - 25 mg/dL Final    Creatinine 1.42 (*) 0.60 - 1.30 mg/dL Final    Comment: Standardized to IDMS reference method    Glucose 150 (*) 65 - 140 mg/dL Final    Comment: If the patient is fasting, the ADA then defines impaired fasting glucose as > 100 mg/dL and diabetes as > or equal to 123 mg/dL.    Calcium 8.5  8.4 - 10.2 mg/dL Final    eGFR 52  ml/min/1.73sq m Final    Narrative:     National Kid[employer] Foundation guidelines for Chronic Kidney Disease (CKD):     Stage 1 with normal or high GFR (GFR > 90 mL/min/1.73 square meters)    Stage 2 Mild CKD (GFR = 60-89 mL/min/1.73 square meters)    Stage 3A Moderate CKD (GFR = 45-59 mL/min/1.73 square meters)    Stage 3B Moderate CKD (GFR = 30-44 mL/min/1.73 square meters)    Stage 4 Severe CKD (GFR = 15-29 mL/min/1.73 square meters)    Stage 5 End Stage CKD (GFR <15 mL/min/1.73 square meters)  Note: GFR calculation is accurate only with a steady state creatinine     Time reflects when diagnosis was documented in both MDM as applicable and the Disposition within this note       Time User Action Codes Description Comment    10/4/2024  5:38 AM Km Watkins Add [J44.1] COPD exacerbation  ([facility])           ED Disposition       ED Disposition   Discharge    Condition   Stable    Date/Time   Fri Oct 4, 2024  5:38 AM    Comment   Mathew Bird discharge to home/self care.                   Follow-up Information    None       Patient's Medications   Discharge Prescriptions    PREDNISONE 20 MG TABLET    Take 2 tablets (40 mg total) by mouth daily for 4 days       Start Date: 10/4/2024 End Date: 10/8/2024       Order Dose: 40 mg       Quantity: 8 tablet    Refills: 0     No discharge procedures on file.  Prior to Admission Medications   Prescriptions Last Dose Informant Patient Reported? Taking?   Multiple Vitamins-Minerals (Centrum Silver 50+Men) TABS  Self Yes No   Sig: Take by mouth   Nebulizers (AERONEB GO NEBULIZER HANDSET) MISC  Self No No   Sig: by Does not apply route 2 (two) times a day as needed (wheezing)   albuterol (2.5 mg/3 mL) 0.083 % nebulizer solution  Self No No   Sig: Take 3 mL (2.5 mg total) by nebulization every 4 (four) hours as needed for wheezing or shortness of breath   albuterol (PROVENTIL HFA,VENTOLIN HFA) 90 mcg/act inhaler  Self No No   Sig: Inhale 2 puffs every 4 (four) hours as needed for wheezing or shortness of breath   aspirin ([employer] Aspirin Adult Low Dose) 81 mg chewable tablet  Self No No   Sig: CHEW 1 TABLET BY MOUTH DAILY   atorvastatin (LIPITOR) 40 mg tablet  Self No No   Sig: TAKE 1 TABLET BY MOUTH EVERY DAY WITH DINNER   budesonide-formoterol (Symbicort) 160-4.5 mcg/act inhaler   No No   Sig: Inhale 2 puffs daily as needed (for shortness of breath) Rinse mouth after use.   fluticasone-umeclidinium-vilanterol (Trelegy Ellipta) 200-62.5-25 mcg/actuation AEPB inhaler   No No   Sig: Inhale 1 puff daily Rinse mouth after use.   fluticasone-umeclidinium-vilanterol (Trelegy Ellipta) 100-62.5-25 mcg/actuation inhaler   No No   Sig: Inhale 1 puff daily Rinse mouth after use.   ipratropium-albuterol (DUO-NEB) 0.5-2.5 mg/3 mL nebulizer solution   No No   Sig: Take 3 mL by  "nebulization 4 (four) times a day   metFORMIN (GLUCOPHAGE-XR) 750 mg 24 hr tablet  Self No No   Sig: Take 1 tablet (750 mg total) by mouth daily with breakfast   metoprolol succinate (TOPROL-XL) 25 mg 24 hr tablet  Self No No   Sig: TAKE 1 TABLET (25 MG TOTAL) BY MOUTH DAILY AT BEDTIME DAILY AT BEDTIME   nicotine (NICODERM CQ) 21 mg/24 hr TD 24 hr patch  Self No No   Sig: Place 1 patch on the skin over 24 hours daily   pantoprazole (PROTONIX) 40 mg tablet  Self No No   Sig: TAKE 1 TABLET BY MOUTH EVERY DAY   sulfaSALAzine (AZULFIDINE) 500 mg tablet  Self No No   Sig: Take 1 tablet (500 mg total) by mouth 4 (four) times a day      Facility-Administered Medications: None       Portions of the record may have been created with voice recognition software. Occasional wrong word or \"sound a like\" substitutions may have occurred due to the inherent limitations of voice recognition software. Read the chart carefully and recognize, using context, where substitutions have occurred.         ED Course         Critical Care Time  Procedures      "

## 2024-10-04 NOTE — ED PROVIDER NOTES
Final diagnoses:   COPD exacerbation (HCC)     ED Disposition       ED Disposition   Discharge    Condition   Stable    Date/Time   Fri Oct 4, 2024  5:38 AM    Comment   Mathew Bird discharge to home/self care.                   Assessment & Plan       Medical Decision Making  64-year-old male with chronic respiratory failure due to COPD with respiratory distress.  Patient was with normal vital signs on presentation on the CPAP mask which was changed to BiPAP.  BiPAP mask was taken off and patient was placed on 4 L nasal cannula with normal oxygen saturations.  Patient was in respiratory distress and ill-appearing but nontoxic.  Mucous membranes are moist.  Heart sounds were normal with regular rate and rhythm.  Lungs with diffuse wheezing and decreased breath sounds throughout and increased respiratory effort.  Abdomen with large mid abdominal reducible nontender hernia and otherwise soft and nondistended.  Distal pulses are equal and intact.  There is no lower extremity edema.  Concern for COPD exacerbation, pneumonia, viral syndrome, pneumothorax, pleural effusion.  I will get CBC, BMP, CXR, give JIMENEZ neb.  Patient received steroids and magnesium from EMS.    Amount and/or Complexity of Data Reviewed  Labs: ordered. Decision-making details documented in ED Course.  Radiology: ordered and independent interpretation performed.    Risk  Prescription drug management.        ED Course as of 10/04/24 0540   Fri Oct 04, 2024   0452 CBC and differential(!)  Mild leukocytosis. Mild stable anemia. No thrombocytopenia.   0537 Basic metabolic panel(!)  Normal electrolytes.  Baseline kidney function.   0537 Reassessed patient.  He is back on his baseline oxygen and is feeling back to normal and is ready to go home.  I discussed the reassuring workup and plan for discharge with short course of steroids and primary care follow-up. Patient voiced understanding of the plan and all questions were answered. Strict return  precautions given. Patient is hemodynamically stable and safe for discharge at this time.       Medications   albuterol (FOR EMS ONLY) (2.5 mg/3 mL) 0.083 % inhalation solution 5 mg (0 mg Does not apply Given to EMS 10/4/24 0419)   ipratropium-albuterol (FOR EMS ONLY) (DUO-NEB) 0.5-2.5 mg/3 mL inhalation solution 3 mL (0 mL Does not apply Given to EMS 10/4/24 0419)   magnesium sulfate (FOR EMS ONLY) 2 g/50 mL IVPB (premix) 2 g (0 g Does not apply Given to EMS 10/4/24 0419)   methylPREDNISolone sodium succinate (FOR EMS ONLY) (Solu-MEDROL) 125 MG injection 125 mg (0 mg Does not apply Given to EMS 10/4/24 0419)   albuterol inhalation solution 10 mg (10 mg Nebulization Given 10/4/24 0423)   ipratropium (ATROVENT) 0.02 % inhalation solution 1 mg (1 mg Nebulization Given 10/4/24 0423)   sodium chloride 0.9 % inhalation solution 12 mL (12 mL Nebulization Given 10/4/24 0422)       ED Risk Strat Scores                                               History of Present Illness       Chief Complaint   Patient presents with    Respiratory Distress     Patient coming from home, called EMS for SOB an hour ago, EMS found patient in tripod position, given breathing tx and mag nesium by EMS, patient arrived on bipap, hx COPD normally of 4L prn       Past Medical History:   Diagnosis Date    Alcohol abuse 05/15/2018    Anxiety     Asthma     Cardiac disease     Cervical stenosis (uterine cervix)     Chest pain     Chronic kidney disease     Colitis     Colon polyps     COPD (chronic obstructive pulmonary disease) (HCC)     2L @ HS and PRN    Coronary artery disease     Diverticulitis     Esophageal reflux     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HCC)     Heart failure, systolic, due to idiopathic cardiomyopathy (HCC) 05/21/2018    Hyperlipidemia     Hypertension     IBD (inflammatory bowel disease)     Myocardial infarction (HCC)     Old myocardial infarction     Paroxysmal atrial fibrillation (HCC) 08/24/2018    Perforation of colon ([facility])      Pericarditis 12/07/2016    Type 2 diabetes, diet controlled (HCC)     Ulcerative colitis (HCC)     VT (ventricular tachycardia) ([facility]) 12/08/2023      Past Surgical History:   Procedure Laterality Date    ANGIOPLASTY      APPENDECTOMY      COLON SURGERY      COLONOSCOPY N/A 10/24/2016    Procedure: COLONOSCOPY;  Surgeon: Tirso Juárez MD;  Location: [facility];  Service:     CORONARY ANGIOPLASTY WITH STENT PLACEMENT      ESOPHAGOGASTRODUODENOSCOPY N/A 10/24/2016    Procedure: ESOPHAGOGASTRODUODENOSCOPY (EGD);  Surgeon: Tirso Juárez MD;  Location: [facility];  Service:     EXPLORATORY LAPAROTOMY W/ BOWEL RESECTION N/A 5/22/2018    Procedure: EXPLORATORY LAPAROTOMY, ILIOCOLECTOMY, ILIOCOLONIC ANASTAMOSIS, LOOP ILIOSTOMY, REPAIR OF SEROSAL TEAR, EXTENSIVE LYSIS OF ADHESIONS, WOUND VAC PLACEMENT;  Surgeon: Tirso Juárez MD;  Location: [facility];  Service: Colorectal    HEMICOLECTOMY      ILEOSTOMY CLOSURE N/A 10/5/2018    Procedure: CLOSURE ILEOSTOMY;  Surgeon: Tirso Juárez MD;  Location: [facility];  Service: Colorectal    OTHER SURGICAL HISTORY      stent indications acute myocardial infarction    TN COLONOSCOPY FLX DX W/COLLJ SPEC WHEN PFRMD N/A 2/6/2018    Procedure: COLONOSCOPY;  Surgeon: Tirso Juárez MD;  [facility]: [facility];  Service: Colorectal    TN COLONOSCOPY FLX DX W/COLLJ SPEC WHEN PFRMD N/A 10/4/2018    Procedure: COLONOSCOPY;  Surgeon: Tirso Juárez MD;  [facility]: [facility];  Service: Colorectal    TONSILLECTOMY        Family History   Problem Relation Age of Onset    Coronary artery disease Father     Crohn's disease Father     Stroke Father     Diabetes Family       Social History     Tobacco Use    Smoking status: Every Day     Types: Cigars     Passive exposure: Past    Smokeless tobacco: Never    Tobacco comments:     4-5cigars per day before nowadays smokes about 1 cigar, trying to cut down.   Vaping Use    Vaping status: Never Used   Substance Use Topics    Alcohol  use: Not Currently     Comment: last drink may 2023    Drug use: No      E-Cigarette/Vaping    E-Cigarette Use Never User       E-Cigarette/Vaping Substances    Nicotine No     THC No     CBD No     Flavoring No     Other No     Unknown No       I have reviewed and agree with the history as documented.     HPI  64-year-old male with history of COPD, chronic hypoxic respiratory failure on 4 L nasal cannula as needed at home who presents to the ED in respiratory distress.  He states that he woke up about an hour prior to presentation with severe dyspnea and called 911.  He states that he has not been feeling great the past few days since they changed around his inhalers.  He does note a cough but states it is chronic and has not changed at all.  He has been taking his inhalers as prescribed.  He denies headache, lightheadedness, chest pain, palpitations, abdominal pain, nausea, vomiting, diarrhea, decreased urine output.  Review of Systems  See HPI      Objective       ED Triage Vitals [10/04/24 0408]   Temperature Pulse Blood Pressure Respirations SpO2 Patient Position - Orthostatic VS   97.5 °F (36.4 °C) 91 121/58 22 98 % Lying      Temp Source Heart Rate Source BP Location FiO2 (%) Pain Score    Oral Monitor Right arm -- --      Vitals      Date and Time Temp Pulse SpO2 Resp BP Pain Score FACES Pain Rating User   10/04/24 0430 -- 77 100 % 22 134/62 -- -- [place]   10/04/24 0408 97.5 °F (36.4 °C) 91 98 % 22 121/58 -- -- [place]            Physical Exam  Constitutional:       General: He is in acute distress.      Appearance: He is ill-appearing. He is not toxic-appearing.   HENT:      Head: Normocephalic.      Mouth/Throat:      Mouth: Mucous membranes are moist.      Pharynx: Oropharynx is clear.      Comments: BiPAP mask present  Eyes:      Extraocular Movements: Extraocular movements intact.   Cardiovascular:      Rate and Rhythm: Normal rate and regular rhythm.      Pulses: Normal pulses.      Heart sounds: Normal heart  sounds.   Pulmonary:      Effort: Respiratory distress present.      Breath sounds: Wheezing present.      Comments: Diffuse wheezing and decreased breath sounds throughout  Abdominal:      General: There is no distension.      Palpations: Abdomen is soft.      Tenderness: There is no abdominal tenderness.      Hernia: A hernia (Large mid abdominal reducible nontender hernia) is present.   Musculoskeletal:      Cervical back: Neck supple.      Right lower leg: No edema.      Left lower leg: No edema.   Skin:     General: Skin is warm and dry.   Neurological:      General: No focal deficit present.      Mental Status: He is alert and oriented to person, place, and time.         Results Reviewed       Procedure Component Value Units Date/Time    Basic metabolic panel [[id number]]  (Abnormal) Collected: 10/04/24 0422    Lab Status: Final result Specimen: Blood from Arm, Left Updated: 10/04/24 0458     Sodium 143 mmol/L      Potassium 3.9 mmol/L      Chloride 111 mmol/L      CO2 25 mmol/L      ANION GAP 7 mmol/L      BUN 18 mg/dL      Creatinine 1.42 mg/dL      Glucose 150 mg/dL      Calcium 8.5 mg/dL      eGFR 52 ml/min/1.73sq m     Narrative:      National Kidney Disease Foundation guidelines for Chronic Kidney Disease (CKD):     Stage 1 with normal or high GFR (GFR > 90 mL/min/1.73 square meters)    Stage 2 Mild CKD (GFR = 60-89 mL/min/1.73 square meters)    Stage 3A Moderate CKD (GFR = 45-59 mL/min/1.73 square meters)    Stage 3B Moderate CKD (GFR = 30-44 mL/min/1.73 square meters)    Stage 4 Severe CKD (GFR = 15-29 mL/min/1.73 square meters)    Stage 5 End Stage CKD (GFR <15 mL/min/1.73 square meters)  Note: GFR calculation is accurate only with a steady state creatinine    CBC and differential [[id number]]  (Abnormal) Collected: 10/04/24 0422    Lab Status: Final result Specimen: Blood from Arm, Left Updated: 10/04/24 0434     WBC 11.21 Thousand/uL      RBC 4.76 Million/uL      Hemoglobin 11.9 g/dL      Hematocrit  39.1 %      MCV 82 fL      MCH 25.0 pg      MCHC 30.4 g/dL      RDW 19.0 %      MPV 10.3 fL      Platelets 181 Thousands/uL      nRBC 0 /100 WBCs      Segmented % 60 %      Immature Grans % 1 %      Lymphocytes % 24 %      Monocytes % 11 %      Eosinophils Relative 4 %      Basophils Relative 0 %      Absolute Neutrophils 6.72 Thousands/µL      Absolute Immature Grans 0.08 Thousand/uL      Absolute Lymphocytes 2.72 Thousands/µL      Absolute Monocytes 1.18 Thousand/µL      Eosinophils Absolute 0.46 Thousand/µL      Basophils Absolute 0.05 Thousands/µL             XR chest 1 view portable   ED Interpretation by Km Watkins DO (10/04 0537)   Small left pleural effusion but otherwise no focal consolidations or pneumothorax.          Procedures    ED Medication and Procedure Management   Prior to Admission Medications   Prescriptions Last Dose Informant Patient Reported? Taking?   Multiple Vitamins-Minerals (Centrum Silver 50+Men) TABS  Self Yes No   Sig: Take by mouth   Nebulizers (AERONEB GO NEBULIZER HANDSET) MISC  Self No No   Sig: by Does not apply route 2 (two) times a day as needed (wheezing)   albuterol (2.5 mg/3 mL) 0.083 % nebulizer solution  Self No No   Sig: Take 3 mL (2.5 mg total) by nebulization every 4 (four) hours as needed for wheezing or shortness of breath   albuterol (PROVENTIL HFA,VENTOLIN HFA) 90 mcg/act inhaler  Self No No   Sig: Inhale 2 puffs every 4 (four) hours as needed for wheezing or shortness of breath   aspirin ([employer] Aspirin Adult Low Dose) 81 mg chewable tablet  Self No No   Sig: CHEW 1 TABLET BY MOUTH DAILY   atorvastatin (LIPITOR) 40 mg tablet  Self No No   Sig: TAKE 1 TABLET BY MOUTH EVERY DAY WITH DINNER   budesonide-formoterol (Symbicort) 160-4.5 mcg/act inhaler   No No   Sig: Inhale 2 puffs daily as needed (for shortness of breath) Rinse mouth after use.   fluticasone-umeclidinium-vilanterol (Trelegy Ellipta) 200-62.5-25 mcg/actuation AEPB inhaler   No No   Sig: Inhale 1  puff daily Rinse mouth after use.   fluticasone-umeclidinium-vilanterol (Trelegy Ellipta) 100-62.5-25 mcg/actuation inhaler   No No   Sig: Inhale 1 puff daily Rinse mouth after use.   ipratropium-albuterol (DUO-NEB) 0.5-2.5 mg/3 mL nebulizer solution   No No   Sig: Take 3 mL by nebulization 4 (four) times a day   metFORMIN (GLUCOPHAGE-XR) 750 mg 24 hr tablet  Self No No   Sig: Take 1 tablet (750 mg total) by mouth daily with breakfast   metoprolol succinate (TOPROL-XL) 25 mg 24 hr tablet  Self No No   Sig: TAKE 1 TABLET (25 MG TOTAL) BY MOUTH DAILY AT BEDTIME DAILY AT BEDTIME   nicotine (NICODERM CQ) 21 mg/24 hr TD 24 hr patch  Self No No   Sig: Place 1 patch on the skin over 24 hours daily   pantoprazole (PROTONIX) 40 mg tablet  Self No No   Sig: TAKE 1 TABLET BY MOUTH EVERY DAY   sulfaSALAzine (AZULFIDINE) 500 mg tablet  Self No No   Sig: Take 1 tablet (500 mg total) by mouth 4 (four) times a day      Facility-Administered Medications: None     Patient's Medications   Discharge Prescriptions    PREDNISONE 20 MG TABLET    Take 2 tablets (40 mg total) by mouth daily for 4 days       Start Date: 10/4/2024 End Date: 10/8/2024       Order Dose: 40 mg       Quantity: 8 tablet    Refills: 0     No discharge procedures on file.  ED SEPSIS DOCUMENTATION   Time reflects when diagnosis was documented in both MDM as applicable and the Disposition within this note       Time User Action Codes Description Comment    10/4/2024  5:38 AM Km Watkins Add [J44.1] COPD exacerbation (HCC)                  Km Watkins,   10/04/24 0540

## 2024-10-04 NOTE — PROGRESS NOTES
Outpatient Care Management Note    ADT alert received- ED discharge. Chart reviewed.  Patient was seen in the ED at [facility] 10/4/24 for COPD exacerbation.  Patient presented via EMS for respiratory distress.    Per chart, next appointment with Pulmonology is scheduled for 11/5/24.    RN CM placed outreach call to patient. No patient answer. Left VM message and included RN CM contact information and request for return call.    RN CM will schedule a second outreach attempt for next week if no patient response received prior.

## 2024-10-05 DIAGNOSIS — I25.10 CORONARY ARTERY DISEASE INVOLVING NATIVE CORONARY ARTERY OF NATIVE HEART WITHOUT ANGINA PECTORIS: ICD-10-CM

## 2024-10-07 RX ORDER — ATORVASTATIN CALCIUM 40 MG/1
40 TABLET, FILM COATED ORAL
Qty: 90 TABLET | Refills: 1 | Status: SHIPPED | OUTPATIENT
Start: 2024-10-07

## 2024-10-08 ENCOUNTER — PATIENT OUTREACH (OUTPATIENT)
Dept: CASE MANAGEMENT | Facility: OTHER | Age: 64
End: 2024-10-08

## 2024-10-08 NOTE — PROGRESS NOTES
Outpatient Care Management Note    IB reminder: ADT alert- second outreach attempt needed.  Chart reviewed.    Patient was seen in the ED at  [facility] 10/4/24 for COPD exacerbation.  Per chart, next pulmonary appointment is on 11/5/24.    RN AZUCENA was unable to reach patient during telephone outreach attempt made on 10/4/24. VM message left with RN CM contact information included.    Second outreach attempt made now.  No patient answer received.  Left VM message and included RN CM contact information and request for return call.    Since RN CM has been unable to reach patient over last two outreach attempts, UTR letter will be sent per CM protocol.    UTR letter sent via [employer].    RN CM will continue to monitor for patient response to the above noted outreach attempts.  RN CM will remove self from care team and close episode in two weeks if no patient response received prior.

## 2024-10-08 NOTE — LETTER
Date: 10/08/24    Dear Mathew Bird,   My name is Nataliia Newman; I am a registered nurse care manager working with   [employer] [address] [address]  I have not been able to reach you and would like to set a time that I can talk with you over the phone.  My work is to help patients that have complex medical conditions get the care they need. This includes patients who may have been in the hospital or emergency room.    Please call me with any questions you may have. I look forward to speaking with you.  Sincerely,  Nataliia Newman BSN, RN  [phone]  Outpatient Care Manager

## 2024-10-13 NOTE — ASSESSMENT & PLAN NOTE
"Diagnostic Evaluation Consultation  Crisis Assessment    Patient Name: Prakash Prasad  Age:  36 year old  Legal Sex: male  Gender Identity: transgender male  Pronouns: he/him/his  Race: White  Ethnicity: Not  or   Language: English      Patient was assessed: Virtual: [email or web address]   Crisis Assessment Start Date: 10/12/24  Crisis Assessment Start Time: 2352  Crisis Assessment Stop Time: 0024  Patient location: [facility] EMERGENCY DEPT                             ED08    Referral Data and Chief Complaint  Prakash Prasad presents to the ED via EMS. Patient is presenting to the ED for the following concerns: Suicidal ideation, Other (see comment) (Ingestion of a large amount of  medications to \"relax\".  Patient denied that it was a suicide attempt.).   Factors that make the mental health crisis life threatening or complex are:  Patient reported that he was not feeling well mentally or physically.  Patient denied any triggers that happened.  Patient reported that he took medications, he denied that it was an attempt to harm himself.  Patient reported that he was trying to relax.  Patient reported that they had been feeling dizzy and nauseous.  Patient reported that they called the ambulance when they started to have physical symptoms.  Patient stated, \"I wouldn't have called an ambulance if I wanted to die.\"  Patient minimized symptoms throughout visit and gave minimal responses.  Patient minimally engaged in safety planning and did not want collateral called, stating they will not know what happened.  At the end of the assessment he started to open up about stressors that occurred today. He shared that he found out that someone, he considered a friend, was telling his staff that he felt that the patient was to at risk to be living on their own related to recent hospitalizations. He shared that he lived in a group home from 2021 until August of 2024 when he was able to move into his own apartment. " Stable since 2018, no follow up imaging needed  " He does not want to lose his ability to live in his own place.  He reported that he has contact with [facility] [facility] on a daily basis..      Informed Consent and Assessment Methods  Explained the crisis assessment process, including applicable information disclosures and limits to confidentiality, assessed understanding of the process, and obtained consent to proceed with the assessment.  Assessment methods included conducting a formal interview with patient, review of medical records, collaboration with medical staff, and obtaining relevant collateral information from family and community providers when available.  : done     Patient response to interventions: needs reinforcement  Coping skills were attempted to reduce the crisis:  Patient called 911 when he started to experience physical symptoms of nausea, dizziness following ingestion of medications     History of the Crisis   Patient was hospitalized in September for suicidal ideations, self-injurious behavior.  He reported that he is currently under committment through [facility]. He reported that he stopped taking his medications, except Klonopin a couple of weeks ago.  He initailly reported that he was not participating in any of his mental health services.  At the end of the visit he reported that he has continued to meet with his [facility] worker twice weekly and talks to his Committment  almost daily.    Brief Psychosocial History  Family:  Single, Children no  Support System:   (\"I don't have one.\")  Employment Status:  other (see comments) (patient denied)  Source of Income:  none  Financial Environmental Concerns:     Current Hobbies:  other (see comments) (Patient reported that they do not have any hobbies)  Barriers in Personal Life:  mental health concerns    Significant Clinical History  Current Anxiety Symptoms:   (patient denied)  Current Depression/Trauma:  thoughts of death/suicide (patient denied)  Current " "Somatic Symptoms:   (patient denied)  Current Psychosis/Thought Disturbance:   (no hallucinations)  Current Eating Symptoms:   (Patient reported that he has not had any changes.)  Chemical Use History:  Alcohol: None  Benzodiazepines: None  Opiates: None  Cocaine: None  Marijuana: None  Other Use: None  Addictions: Other (comment) (Patient denied)   Past diagnosis:   (\"I don't really remember\")  Family history:  No known history of mental health or chemical health concerns  Past treatment:     Details of most recent treatment:  Patient reported that they quit all of his mental health treatments a couple of weeks ago.  Patient reported that they have  that they talk every day. Later reported that they do meet with [facility] worker twice weekly.  Patient reported that their \"old psychiatrist\" through [facility] has been prescribing Klonopin.   Patient reported that discontinued other medications about 2 weeks ago.  Other relevant history:          Collateral Information  Is there collateral information: No     Collateral information name, relationship, phone number:       What happened today:       What is different about patient's functioning:       Concern about alcohol/drug use:      What do you think the patient needs:      Has patient made comments about wanting to kill themselves/others:      If d/c is recommended, can they take part in safety/aftercare planning:       Additional collateral information:        Risk Assessment  [place] Suicide Severity Rating Scale Full Clinical Version:  Suicidal Ideation  Q1 Wish to be Dead (Lifetime): Yes  Q2 Non-Specific Active Suicidal Thoughts (Lifetime): Yes  3. Active Suicidal Ideation with any Methods (Not Plan) Without Intent to Act (Lifetime): Yes  Q4 Active Suicidal Ideation with Some Intent to Act, Without Specific Plan (Lifetime): Yes  Q5 Active Suicidal Ideation with Specific Plan and Intent (Lifetime): Yes  Q6 Suicide Behavior (Lifetime): yes     Suicidal " Behavior (Lifetime)  Actual Attempt (Lifetime): Yes  Total Number of Actual Attempts (Lifetime): 2  Actual Attempt Description (Lifetime): Patient reported that last suicide attempt was 10 years ago.    [place] Suicide Severity Rating Scale Recent:   Suicidal Ideation (Recent)  Q1 Wished to be Dead (Past Month): no  Q2 Suicidal Thoughts (Past Month): no  Q3 Suicidal Thought Method: no  Q4 Suicidal Intent without Specific Plan: no  Q5 Suicide Intent with Specific Plan: no  If yes to Q6, within past 3 months?: no  Level of Risk per Screen: no risks indicated     Suicidal Behavior (Recent)  Has subject engaged in non-suicidal self-injurious behavior? (Past 3 Months): Yes    Environmental or Psychosocial Events: recent life events (see comment)  Protective Factors: Protective Factors: other (see comment) (Patient was apathetic during visit and was not able to identify protective factors at time of initial assessment)    Does the patient have thoughts of harming others? Feels Like Hurting Others: no  Previous Attempt to Hurt Others: no  Current presentation:  (Apathetic)  Is the patient engaging in sexually inappropriate behavior?: no    Is the patient engaging in sexually inappropriate behavior?  no        Mental Status Exam   Affect: Flat  Appearance: Appropriate  Attention Span/Concentration: Attentive  Eye Contact: Variable    Fund of Knowledge: Appropriate   Language /Speech Content: Fluent  Language /Speech Volume: Normal  Language /Speech Rate/Productions: Normal  Recent Memory: Variable  Remote Memory: Variable  Mood: Apathetic  Orientation to Person: Yes   Orientation to Place: Yes  Orientation to Time of Day: Yes  Orientation to Date: Yes     Situation (Do they understand why they are here?): Yes  Psychomotor Behavior: Normal  Thought Content: Clear  Thought Form: Intact     Mini-Cog Assessment  Number of Words Recalled:    Clock-Drawing Test:     Three Item Recall:    Mini-Cog Total Score:        Medication  Psychotropic medications:   Medication Orders - Psychiatric (From admission, onward)      Start     Dose/Rate Route Frequency Ordered Stop    10/13/24 0120  OLANZapine (zyPREXA) injection 10 mg         10 mg Intramuscular ONCE 10/13/24 0118               Current Care Team  Patient Care Team:  Becki Avery APRN CNP as PCP - General (Family Medicine)  Kathie Sandhu MD as MD (Neurology)  Tessa Galvan, RN as Specialty Care Coordinator  Ashu Russell DO as MD (Psychiatry & Neurology - Neurology)  Becki Avery APRN CNP as Assigned PCP  Desmond West as Specialty Care Coordinator (Plastic Surgery)  Harvey Negron MD as MD (Psychiatry)  Dora Mazariegos, PhD (Student in organized health care education/training program)  Glenda Juan MD as Resident (Family Medicine)  Ayana  as   Oswaldo Amado MD as MD (Dermatology)  Regine Last APRN CNP as Assigned Behavioral Health Provider  Mark Cleary MD as MD (Dermatology)  Ashu Russell DO as MD (Neurology)  Luz Moncada APRN CNP as Assigned Neuroscience Provider  Alice Tubbs [facility] as Pharmacist (Pharmacist)  Mark Cleary MD as Assigned Surgical Provider  Moriah Rojas [facility] as Pharmacist (Pharmacist)  Leventhal, Thomas Michael, MD as MD (Gastroenterology)  Mark Cleary MD as MD (Dermatology)  Ofelia Bess PA-C as Physician Assistant (Neurological Surgery)  Espinoza Jimenez MD as MD (Cardiovascular Disease)  Espinoza Jimenez MD as Assigned Heart and Vascular Provider  Kevin Pan MD as MD (OB/Gyn)  Kevin Pan MD as Assigned OBGYN Provider  Jie David as   Jim Corbett as Psychiatrist  Krysten Gonsales as Therapist  Sydni Hawkins BSW as Lead Care Coordinator (Primary Care - CC)    Diagnosis  Patient Active Problem List   Diagnosis Code    ADHD (attention  deficit hyperactivity disorder) F90.9    Bipolar 1 disorder, manic, mild F31.11    Marijuana abuse F12.10    Polysubstance abuse (H) F19.10    GERD (gastroesophageal reflux disease) K21.9    Tobacco abuse Z72.0    Intractable back pain M54.9    Optic neuritis H46.9    Cauda equina syndrome with neurogenic bladder (H) G83.4    Schizoaffective disorder, bipolar type (H) F25.0    PTSD (post-traumatic stress disorder) F43.10    Anxiety F41.9    Auditory hallucination R44.0    Nephrolithiasis N20.0    Cyst of left ovary N83.202    Borderline personality disorder (H) F60.3    Cannabis use disorder, severe, dependence (H) F12.20    Depression F32.A    Episodic mood disorder (H) F39    History of heroin abuse (H) F11.11    Opioid use disorder, severe, dependence (H) F11.20    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    Nausea R11.0    Urinary retention R33.9    Chronic bilateral low back pain without sciatica M54.50, G89.29    AVA (generalized anxiety disorder) F41.1    Aggressive behavior R46.89    Lumbosacral radiculopathy at L5 M54.17    Abnormal uterine bleeding N93.9    Bipolar affective disorder, mixed, severe, with psychotic behavior (H) F31.64    Akathisia G25.71    Hypertension, unspecified type I10    Female-to-male transgender person Z78.9    Morbid obesity (H) E66.01    Mood disorder due to a general medical condition F06.30    Acne vulgaris L70.0    Type 2 diabetes mellitus with hyperglycemia, with long-term current use of insulin (H) E11.65, Z79.4    Herpes labialis B00.1    Major depressive disorder, recurrent severe 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  Prakash reported that he  has been experiencing Chronic suicidal ideations for the last month.  He denied that taking medications this evening was a suicide attempt, stating that he took them because he wanted to relax.  Patient gave minimal responses to questions asked and minimized symptoms throughout the assessment. Patient lives alone.  Patient reported that he his currently under committement through [facility].    Patient coping skills attempted to reduce the crisis:  Patient called 911 when he started to experience physical symptoms of nausea, dizziness following ingestion of medications    Disposition  Recommended disposition:          Reviewed case and recommendations with attending provider. Attending Name:         Attending concurs with disposition:         Patient and/or validated legal guardian concurs with disposition:           Final disposition:  observation    Legal status on admission: Commitment    Assessment Details   Total duration spent with the patient: 32 min     CPT code(s) utilized: 32325 - Psychotherapy for Crisis - 60 (30-74*) min    JULISA BALDWIN, Psychotherapist  [facility] - Triage & Transition Services  Callback: [phone]

## 2024-10-20 DIAGNOSIS — I25.10 CORONARY ARTERY DISEASE INVOLVING NATIVE CORONARY ARTERY OF NATIVE HEART WITHOUT ANGINA PECTORIS: ICD-10-CM

## 2024-10-21 RX ORDER — ASPIRIN 81 MG
81 TABLET,CHEWABLE ORAL DAILY
Qty: 90 TABLET | Refills: 1 | Status: SHIPPED | OUTPATIENT
Start: 2024-10-21

## 2024-10-22 ENCOUNTER — PATIENT OUTREACH (OUTPATIENT)
Dept: CASE MANAGEMENT | Facility: OTHER | Age: 64
End: 2024-10-22

## 2024-10-22 NOTE — PROGRESS NOTES
Outpatient Care Management Note    IB reminder: UTR letter follow up for closure- no patient response received.  Chart reviewed.    Patient was seen in the ED at [facility] 10/4/24 for COPD exacerbation.    Noted that patient has an upcoming appointment with Pulmonology on 11/5/24 and PCP on 11/19/24    RN CM made two telephone outreach attempts to reach patient following the above noted ED discharge and was unable to reach patient.  VM messages were left with RN CM contact information included.    An UTR letter was sent to patient via [employer] on 10/8/24, and no patient response has been received.    RN AZUCENA will remove self from care team and close CM episode at this time due to no patient response to the above noted outreach attempts.

## 2024-10-25 DIAGNOSIS — J44.9 STAGE 4 VERY SEVERE COPD BY GOLD CLASSIFICATION (HCC): ICD-10-CM

## 2024-10-25 RX ORDER — FLUTICASONE FUROATE, UMECLIDINIUM BROMIDE AND VILANTEROL TRIFENATATE 200; 62.5; 25 UG/1; UG/1; UG/1
1 POWDER RESPIRATORY (INHALATION) DAILY
Qty: 60 BLISTER | Refills: 0 | Status: SHIPPED | OUTPATIENT
Start: 2024-10-25 | End: 2024-11-24

## 2024-10-25 RX ORDER — BUDESONIDE AND FORMOTEROL FUMARATE DIHYDRATE 160; 4.5 UG/1; UG/1
2 AEROSOL RESPIRATORY (INHALATION) DAILY PRN
Qty: 10.2 G | Refills: 0 | Status: SHIPPED | OUTPATIENT
Start: 2024-10-25

## 2024-10-25 NOTE — TELEPHONE ENCOUNTER
Refill must be reviewed and completed by the office or provider. The refill is unable to be approved or denied by the medication management team.     Medication not assigned to a protocol, review manually.

## 2024-11-01 ENCOUNTER — APPOINTMENT (EMERGENCY)
Dept: RADIOLOGY | Facility: HOSPITAL | Age: 64
DRG: 389 | End: 2024-11-01
Payer: COMMERCIAL

## 2024-11-01 ENCOUNTER — HOSPITAL ENCOUNTER (INPATIENT)
Facility: HOSPITAL | Age: 64
LOS: 4 days | Discharge: HOME | DRG: 389 | End: 2024-11-05
Attending: EMERGENCY MEDICINE | Admitting: SURGERY
Payer: COMMERCIAL

## 2024-11-01 DIAGNOSIS — K56.609 BOWEL OBSTRUCTION (HCC): ICD-10-CM

## 2024-11-01 DIAGNOSIS — K56.609 INTESTINAL OBSTRUCTION, UNSPECIFIED CAUSE, UNSPECIFIED WHETHER PARTIAL OR COMPLETE (HCC): ICD-10-CM

## 2024-11-01 DIAGNOSIS — K56.600 PARTIAL SMALL BOWEL OBSTRUCTION (HCC): Primary | ICD-10-CM

## 2024-11-01 LAB
ALBUMIN SERPL BCG-MCNC: 4.1 G/DL (ref 3.5–5)
ALP SERPL-CCNC: 84 U/L (ref 34–104)
ALT SERPL W P-5'-P-CCNC: 13 U/L (ref 7–52)
ANION GAP SERPL CALCULATED.3IONS-SCNC: 8 MMOL/L (ref 4–13)
AST SERPL W P-5'-P-CCNC: 12 U/L (ref 13–39)
ATRIAL RATE: 100 BPM
BACTERIA UR QL AUTO: ABNORMAL /HPF
BASE EX.OXY STD BLDV CALC-SCNC: 81.8 % (ref 60–80)
BASE EXCESS BLDV CALC-SCNC: 1.9 MMOL/L
BASOPHILS # BLD AUTO: 0.04 THOUSANDS/ΜL (ref 0–0.1)
BASOPHILS NFR BLD AUTO: 0 % (ref 0–1)
BILIRUB SERPL-MCNC: 0.53 MG/DL (ref 0.2–1)
BILIRUB UR QL STRIP: NEGATIVE
BUN SERPL-MCNC: 19 MG/DL (ref 5–25)
CALCIUM SERPL-MCNC: 9 MG/DL (ref 8.4–10.2)
CHLORIDE SERPL-SCNC: 104 MMOL/L (ref 96–108)
CLARITY UR: ABNORMAL
CO2 SERPL-SCNC: 29 MMOL/L (ref 21–32)
COLOR UR: YELLOW
CREAT SERPL-MCNC: 1.25 MG/DL (ref 0.6–1.3)
EOSINOPHIL # BLD AUTO: 0.14 THOUSAND/ΜL (ref 0–0.61)
EOSINOPHIL NFR BLD AUTO: 1 % (ref 0–6)
ERYTHROCYTE [DISTWIDTH] IN BLOOD BY AUTOMATED COUNT: 18.3 % (ref 11.6–15.1)
GFR SERPL CREATININE-BSD FRML MDRD: 61 ML/MIN/1.73SQ M
GLUCOSE SERPL-MCNC: 125 MG/DL (ref 65–140)
GLUCOSE UR STRIP-MCNC: NEGATIVE MG/DL
HCO3 BLDV-SCNC: 27.8 MMOL/L (ref 24–30)
HCT VFR BLD AUTO: 39.7 % (ref 36.5–49.3)
HGB BLD-MCNC: 12.3 G/DL (ref 12–17)
HGB UR QL STRIP.AUTO: NEGATIVE
HYALINE CASTS #/AREA URNS LPF: ABNORMAL /LPF
IMM GRANULOCYTES # BLD AUTO: 0.06 THOUSAND/UL (ref 0–0.2)
IMM GRANULOCYTES NFR BLD AUTO: 0 % (ref 0–2)
KETONES UR STRIP-MCNC: NEGATIVE MG/DL
LACTATE SERPL-SCNC: 0.7 MMOL/L (ref 0.5–2)
LEUKOCYTE ESTERASE UR QL STRIP: ABNORMAL
LYMPHOCYTES # BLD AUTO: 1.6 THOUSANDS/ΜL (ref 0.6–4.47)
LYMPHOCYTES NFR BLD AUTO: 11 % (ref 14–44)
MCH RBC QN AUTO: 25.6 PG (ref 26.8–34.3)
MCHC RBC AUTO-ENTMCNC: 31 G/DL (ref 31.4–37.4)
MCV RBC AUTO: 83 FL (ref 82–98)
MONOCYTES # BLD AUTO: 1.12 THOUSAND/ΜL (ref 0.17–1.22)
MONOCYTES NFR BLD AUTO: 8 % (ref 4–12)
MUCOUS THREADS UR QL AUTO: ABNORMAL
NEUTROPHILS # BLD AUTO: 11.67 THOUSANDS/ΜL (ref 1.85–7.62)
NEUTS SEG NFR BLD AUTO: 80 % (ref 43–75)
NITRITE UR QL STRIP: POSITIVE
NON-SQ EPI CELLS URNS QL MICRO: ABNORMAL /HPF
NRBC BLD AUTO-RTO: 0 /100 WBCS
O2 CT BLDV-SCNC: 13.8 ML/DL
P AXIS: 83 DEGREES
PCO2 BLDV: 49.5 MM HG (ref 42–50)
PH BLDV: 7.37 [PH] (ref 7.3–7.4)
PH UR STRIP.AUTO: 5.5 [PH]
PLATELET # BLD AUTO: 226 THOUSANDS/UL (ref 149–390)
PMV BLD AUTO: 10.5 FL (ref 8.9–12.7)
PO2 BLDV: 49.1 MM HG (ref 35–45)
POTASSIUM SERPL-SCNC: 4.3 MMOL/L (ref 3.5–5.3)
PR INTERVAL: 134 MS
PROT SERPL-MCNC: 6.8 G/DL (ref 6.4–8.4)
PROT UR STRIP-MCNC: ABNORMAL MG/DL
QRS AXIS: 69 DEGREES
QRSD INTERVAL: 104 MS
QT INTERVAL: 348 MS
QTC INTERVAL: 448 MS
RBC # BLD AUTO: 4.81 MILLION/UL (ref 3.88–5.62)
RBC #/AREA URNS AUTO: ABNORMAL /HPF
SODIUM SERPL-SCNC: 141 MMOL/L (ref 135–147)
SP GR UR STRIP.AUTO: 1.02 (ref 1–1.03)
T WAVE AXIS: 76 DEGREES
UROBILINOGEN UR STRIP-ACNC: <2 MG/DL
VENTRICULAR RATE: 100 BPM
WBC # BLD AUTO: 14.63 THOUSAND/UL (ref 4.31–10.16)
WBC #/AREA URNS AUTO: ABNORMAL /HPF

## 2024-11-01 PROCEDURE — 99285 EMERGENCY DEPT VISIT HI MDM: CPT | Performed by: EMERGENCY MEDICINE

## 2024-11-01 PROCEDURE — 96375 TX/PRO/DX INJ NEW DRUG ADDON: CPT

## 2024-11-01 PROCEDURE — 96368 THER/DIAG CONCURRENT INF: CPT

## 2024-11-01 PROCEDURE — 80053 COMPREHEN METABOLIC PANEL: CPT

## 2024-11-01 PROCEDURE — 36415 COLL VENOUS BLD VENIPUNCTURE: CPT

## 2024-11-01 PROCEDURE — 82805 BLOOD GASES W/O2 SATURATION: CPT | Performed by: STUDENT IN AN ORGANIZED HEALTH CARE EDUCATION/TRAINING PROGRAM

## 2024-11-01 PROCEDURE — 71045 X-RAY EXAM CHEST 1 VIEW: CPT

## 2024-11-01 PROCEDURE — 81001 URINALYSIS AUTO W/SCOPE: CPT

## 2024-11-01 PROCEDURE — 83605 ASSAY OF LACTIC ACID: CPT | Performed by: STUDENT IN AN ORGANIZED HEALTH CARE EDUCATION/TRAINING PROGRAM

## 2024-11-01 PROCEDURE — 93010 ELECTROCARDIOGRAM REPORT: CPT | Performed by: INTERNAL MEDICINE

## 2024-11-01 PROCEDURE — 74177 CT ABD & PELVIS W/CONTRAST: CPT

## 2024-11-01 PROCEDURE — 87077 CULTURE AEROBIC IDENTIFY: CPT

## 2024-11-01 PROCEDURE — 87040 BLOOD CULTURE FOR BACTERIA: CPT | Performed by: STUDENT IN AN ORGANIZED HEALTH CARE EDUCATION/TRAINING PROGRAM

## 2024-11-01 PROCEDURE — 99285 EMERGENCY DEPT VISIT HI MDM: CPT

## 2024-11-01 PROCEDURE — 87086 URINE CULTURE/COLONY COUNT: CPT

## 2024-11-01 PROCEDURE — 93005 ELECTROCARDIOGRAM TRACING: CPT

## 2024-11-01 PROCEDURE — 86901 BLOOD TYPING SEROLOGIC RH(D): CPT

## 2024-11-01 PROCEDURE — 86900 BLOOD TYPING SEROLOGIC ABO: CPT

## 2024-11-01 PROCEDURE — 87186 SC STD MICRODIL/AGAR DIL: CPT

## 2024-11-01 PROCEDURE — 86850 RBC ANTIBODY SCREEN: CPT

## 2024-11-01 PROCEDURE — 96365 THER/PROPH/DIAG IV INF INIT: CPT

## 2024-11-01 PROCEDURE — 85025 COMPLETE CBC W/AUTO DIFF WBC: CPT

## 2024-11-01 RX ORDER — IPRATROPIUM BROMIDE AND ALBUTEROL SULFATE 2.5; .5 MG/3ML; MG/3ML
3 SOLUTION RESPIRATORY (INHALATION) ONCE
Status: COMPLETED | OUTPATIENT
Start: 2024-11-01 | End: 2024-11-01

## 2024-11-01 RX ORDER — SODIUM CHLORIDE, SODIUM GLUCONATE, SODIUM ACETATE, POTASSIUM CHLORIDE, MAGNESIUM CHLORIDE, SODIUM PHOSPHATE, DIBASIC, AND POTASSIUM PHOSPHATE .53; .5; .37; .037; .03; .012; .00082 G/100ML; G/100ML; G/100ML; G/100ML; G/100ML; G/100ML; G/100ML
1000 INJECTION, SOLUTION INTRAVENOUS ONCE
Status: DISCONTINUED | OUTPATIENT
Start: 2024-11-01 | End: 2024-11-01

## 2024-11-01 RX ORDER — ENOXAPARIN SODIUM 100 MG/ML
40 INJECTION SUBCUTANEOUS DAILY
Status: DISCONTINUED | OUTPATIENT
Start: 2024-11-02 | End: 2024-11-05 | Stop reason: HOSPADM

## 2024-11-01 RX ORDER — SODIUM CHLORIDE, SODIUM GLUCONATE, SODIUM ACETATE, POTASSIUM CHLORIDE, MAGNESIUM CHLORIDE, SODIUM PHOSPHATE, DIBASIC, AND POTASSIUM PHOSPHATE .53; .5; .37; .037; .03; .012; .00082 G/100ML; G/100ML; G/100ML; G/100ML; G/100ML; G/100ML; G/100ML
1000 INJECTION, SOLUTION INTRAVENOUS ONCE
Status: COMPLETED | OUTPATIENT
Start: 2024-11-01 | End: 2024-11-02

## 2024-11-01 RX ORDER — CHLORHEXIDINE GLUCONATE ORAL RINSE 1.2 MG/ML
15 SOLUTION DENTAL EVERY 12 HOURS SCHEDULED
Status: DISCONTINUED | OUTPATIENT
Start: 2024-11-02 | End: 2024-11-05 | Stop reason: HOSPADM

## 2024-11-01 RX ORDER — ACETAMINOPHEN 325 MG/1
975 TABLET ORAL ONCE
Status: COMPLETED | OUTPATIENT
Start: 2024-11-01 | End: 2024-11-01

## 2024-11-01 RX ORDER — SODIUM CHLORIDE, SODIUM LACTATE, POTASSIUM CHLORIDE, CALCIUM CHLORIDE 600; 310; 30; 20 MG/100ML; MG/100ML; MG/100ML; MG/100ML
50 INJECTION, SOLUTION INTRAVENOUS CONTINUOUS
Status: DISCONTINUED | OUTPATIENT
Start: 2024-11-01 | End: 2024-11-02

## 2024-11-01 RX ORDER — LIDOCAINE HYDROCHLORIDE 20 MG/ML
JELLY TOPICAL ONCE
Status: COMPLETED | OUTPATIENT
Start: 2024-11-01 | End: 2024-11-01

## 2024-11-01 RX ORDER — METOPROLOL TARTRATE 25 MG/1
25 TABLET, FILM COATED ORAL ONCE
Status: DISCONTINUED | OUTPATIENT
Start: 2024-11-01 | End: 2024-11-02

## 2024-11-01 RX ADMIN — IPRATROPIUM BROMIDE AND ALBUTEROL SULFATE 3 ML: .5; 3 SOLUTION RESPIRATORY (INHALATION) at 20:46

## 2024-11-01 RX ADMIN — CEFTRIAXONE SODIUM 1000 MG: 10 INJECTION, POWDER, FOR SOLUTION INTRAVENOUS at 23:28

## 2024-11-01 RX ADMIN — LIDOCAINE HYDROCHLORIDE 5 APPLICATION: 20 JELLY TOPICAL at 23:28

## 2024-11-01 RX ADMIN — NICOTINE 7 MG: 7 PATCH, EXTENDED RELEASE TRANSDERMAL at 20:46

## 2024-11-01 RX ADMIN — SODIUM CHLORIDE, SODIUM GLUCONATE, SODIUM ACETATE, POTASSIUM CHLORIDE, MAGNESIUM CHLORIDE, SODIUM PHOSPHATE, DIBASIC, AND POTASSIUM PHOSPHATE 1000 ML: .53; .5; .37; .037; .03; .012; .00082 INJECTION, SOLUTION INTRAVENOUS at 23:28

## 2024-11-01 RX ADMIN — ACETAMINOPHEN 975 MG: 325 TABLET, FILM COATED ORAL at 17:03

## 2024-11-01 RX ADMIN — IOHEXOL 100 ML: 350 INJECTION, SOLUTION INTRAVENOUS at 20:00

## 2024-11-01 RX ADMIN — MORPHINE SULFATE 2 MG: 2 INJECTION, SOLUTION INTRAMUSCULAR; INTRAVENOUS at 17:03

## 2024-11-01 NOTE — ED PROVIDER NOTES
Time reflects when diagnosis was documented in both MDM as applicable and the Disposition within this note       Time User Action Codes Description Comment    11/1/2024  9:42 PM Alonso Garzon Add [K56.600] Partial small bowel obstruction (HCC)     11/1/2024 10:14 PM Jose Aguiar Add [K56.609] Intestinal obstruction, unspecified cause, unspecified whether partial or complete (HCC)     11/1/2024 11:58 PM Daphne Rojas Add [K56.609] Bowel obstruction (HCC)           ED Disposition       None          Assessment & Plan       Medical Decision Making  Amount and/or Complexity of Data Reviewed  Labs: ordered. Decision-making details documented in ED Course.  Radiology: ordered.    Risk  OTC drugs.  Prescription drug management.      Patient is 64-year-old male presenting here today with sudden onset abdominal pain since this morning.  Differentials that I have for this patient include cystitis in the setting of UTI, urethral stone, intestinal ischemia, and gastritis.    For this patient, I have ordered a CBC, CMP, CT abdomen pelvis with contrast, urinalysis, Tylenol, and morphine.    ED Course as of 11/02/24 0207   Fri Nov 01, 2024   1746 CBC and differential(!)  Mildly elevated WBCs with increased neutrophils.   1747 Comprehensive metabolic panel(!)  [facility] CMP   2043 Nicotine patch ordered for patient comfort   2131 Small bowel one side of loop segment dilated and thickened and looks like high grade obstruction according to radiology reading room   2254 Surgery talking to patient   2302 Urine Microscopic(!)  Patient has UTI   Sat Nov 02, 2024   0021 Patient admitted to ICU       Medications   metoprolol tartrate (LOPRESSOR) tablet 25 mg (0 mg Oral Hold 11/1/24 2046)   nicotine (NICODERM CQ) 7 mg/24hr TD 24 hr patch 7 mg (7 mg Transdermal Medication Applied 11/1/24 2046)   ceftriaxone (ROCEPHIN) 1 g/50 mL in dextrose IVPB (1,000 mg Intravenous New Bag 11/1/24 6908)   chlorhexidine (PERIDEX) 0.12 % oral rinse 15  mL (has no administration in time range)   enoxaparin (LOVENOX) subcutaneous injection 40 mg (has no administration in time range)   acetaminophen (TYLENOL) tablet 650 mg (has no administration in time range)   HYDROmorphone HCl (DILAUDID) injection 0.2 mg (has no administration in time range)   ondansetron (ZOFRAN) injection 4 mg (has no administration in time range)   ceftriaxone (ROCEPHIN) 1 g/50 mL in dextrose IVPB (has no administration in time range)   multi-electrolyte (PLASMALYTE-A/ISOLYTE-S PH 7.4) IV solution (has no administration in time range)   morphine injection 2 mg (2 mg Intravenous Given 11/1/24 1703)   acetaminophen (TYLENOL) tablet 975 mg (975 mg Oral Given 11/1/24 1703)   iohexol (OMNIPAQUE) 350 MG/ML injection (SINGLE-DOSE) 100 mL (100 mL Intravenous Given 11/1/24 2000)   ipratropium-albuterol (DUO-NEB) 0.5-2.5 mg/3 mL inhalation solution 3 mL (3 mL Nebulization Given 11/1/24 2046)   multi-electrolyte (ISOLYTE-S PH 7.4) bolus 1,000 mL (1,000 mL Intravenous New Bag 11/1/24 2328)   lidocaine (URO-JET) 2 % urethral/mucosal gel (5 Applications Topical Given 11/1/24 2328)       ED Risk Strat Scores                           SBIRT 22yo+      Flowsheet Row Most Recent Value   Initial Alcohol Screen: US AUDIT-C     1. How often do you have a drink containing alcohol? 0 Filed at: 11/01/2024 1646   2. How many drinks containing alcohol do you have on a typical day you are drinking?  0 Filed at: 11/01/2024 1646   3a. Male UNDER 65: How often do you have five or more drinks on one occasion? 0 Filed at: 11/01/2024 1646   3b. FEMALE Any Age, or MALE 65+: How often do you have 4 or more drinks on one occassion? 0 Filed at: 11/01/2024 1646   Audit-C Score 0 Filed at: 11/01/2024 1646   CASEY: How many times in the past year have you...    Used an illegal drug or used a prescription medication for non-medical reasons? Never Filed at: 11/01/2024 0732                            History of Present Illness        Chief Complaint   Patient presents with    Abdominal Pain     Sharp lower abd pain since this morning. States he also had pain trying to have BM this morning. Is able to pass gas.       Past Medical History:   Diagnosis Date    Alcohol abuse 05/15/2018    Anxiety     Asthma     Cardiac disease     Cervical stenosis (uterine cervix)     Chest pain     Chronic kidney disease     Colitis     Colon polyps     COPD (chronic obstructive pulmonary disease) (HCC)     2L @ HS and PRN    Coronary artery disease     Diverticulitis     Esophageal reflux     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HCC)     Heart failure, systolic, due to idiopathic cardiomyopathy (HCC) 05/21/2018    Hyperlipidemia     Hypertension     IBD (inflammatory bowel disease)     Myocardial infarction (HCC)     Old myocardial infarction     Paroxysmal atrial fibrillation (HCC) 08/24/2018    Perforation of colon ([facility])     Pericarditis 12/07/2016    Type 2 diabetes, diet controlled ([facility])     Ulcerative colitis ([facility])     VT (ventricular tachycardia) ([facility]) 12/08/2023      Past Surgical History:   Procedure Laterality Date    ANGIOPLASTY      APPENDECTOMY      COLON SURGERY      COLONOSCOPY N/A 10/24/2016    Procedure: COLONOSCOPY;  Surgeon: Tirso Juárez MD;  Location: [facility];  Service:     CORONARY ANGIOPLASTY WITH STENT PLACEMENT      ESOPHAGOGASTRODUODENOSCOPY N/A 10/24/2016    Procedure: ESOPHAGOGASTRODUODENOSCOPY (EGD);  Surgeon: Tirso Juárez MD;  Location: [facility];  Service:     EXPLORATORY LAPAROTOMY W/ BOWEL RESECTION N/A 5/22/2018    Procedure: EXPLORATORY LAPAROTOMY, ILIOCOLECTOMY, ILIOCOLONIC ANASTAMOSIS, LOOP ILIOSTOMY, REPAIR OF SEROSAL TEAR, EXTENSIVE LYSIS OF ADHESIONS, WOUND VAC PLACEMENT;  Surgeon: Tirso Juárez MD;  Location: [facility];  Service: Colorectal    HEMICOLECTOMY      ILEOSTOMY CLOSURE N/A 10/5/2018    Procedure: CLOSURE ILEOSTOMY;  Surgeon: Tirso Juárez MD;  Location: [facility];  Service: Colorectal    OTHER  SURGICAL HISTORY      stent indications acute myocardial infarction    OH COLONOSCOPY FLX DX W/COLLJ SPEC WHEN PFRMD N/A 2/6/2018    Procedure: COLONOSCOPY;  Surgeon: Tirso Juárez MD;  Location: [facility];  Service: Colorectal    OH COLONOSCOPY FLX DX W/COLLJ SPEC WHEN PFRMD N/A 10/4/2018    Procedure: COLONOSCOPY;  Surgeon: Tirso Juárez MD;  Location: [facility];  Service: Colorectal    TONSILLECTOMY        Family History   Problem Relation Age of Onset    Coronary artery disease Father     Crohn's disease Father     Stroke Father     Diabetes Family       Social History     Tobacco Use    Smoking status: Every Day     Types: Cigars     Passive exposure: Past    Smokeless tobacco: Never    Tobacco comments:     4-5cigars per day before nowadays smokes about 1 cigar, trying to cut down.   Vaping Use    Vaping status: Never Used   Substance Use Topics    Alcohol use: Not Currently     Comment: last drink may 2023    Drug use: No      E-Cigarette/Vaping    E-Cigarette Use Never User       E-Cigarette/Vaping Substances    Nicotine No     THC No     CBD No     Flavoring No     Other No     Unknown No       I have reviewed and agree with the history as documented.     Patient is a 64-year-old male with a past medical history of Crohn's disease, COPD, and diabetes who presents here today with new onset lower abdominal pain.  Patient said that he woke up this morning with the abdominal pain and has been getting progressively worse through the day.  Patient says that he has tried passing gas and stooling hoping it would go away but it has not gone away.  Patient says that nothing really makes the pain better or worse and is always there.  Patient says that he is in severe pain.      Abdominal Pain  Associated symptoms: shortness of breath (baseline)    Associated symptoms: no chest pain, no chills, no constipation, no cough, no diarrhea, no dysuria, no fever, no hematuria, no sore throat and no vomiting         Review of Systems   Constitutional:  Negative for chills and fever.   HENT:  Negative for ear pain and sore throat.    Eyes:  Negative for pain and visual disturbance.   Respiratory:  Positive for shortness of breath (baseline). Negative for cough.    Cardiovascular:  Negative for chest pain and palpitations.   Gastrointestinal:  Positive for abdominal pain. Negative for blood in stool, constipation, diarrhea and vomiting.   Genitourinary:  Negative for dysuria and hematuria.   Musculoskeletal:  Negative for arthralgias and back pain.   Skin:  Negative for color change and rash.   Neurological:  Positive for light-headedness. Negative for seizures and syncope.   All other systems reviewed and are negative.          Objective       ED Triage Vitals   Temperature Pulse Blood Pressure Respirations SpO2 Patient Position - Orthostatic VS   11/01/24 1644 11/01/24 1644 11/01/24 1644 11/01/24 1644 11/01/24 1644 11/01/24 1910   98.4 °F (36.9 °C) 100 144/83 18 95 % Lying      Temp Source Heart Rate Source BP Location FiO2 (%) Pain Score    11/01/24 1644 11/01/24 1910 11/01/24 1644 -- 11/01/24 1644    Oral Monitor Right arm  8      Vitals      Date and Time Temp Pulse SpO2 Resp BP Pain Score FACES Pain Rating User   11/01/24 2048 -- 114 98 % 21 106/67 -- -- KR   11/01/24 2046 -- 115 -- -- 106/67 -- -- KR   11/01/24 1910 -- 116 98 % 20 134/85 -- -- EM   11/01/24 1904 -- -- 97 % -- -- -- -- EM   11/01/24 1644 98.4 °F (36.9 °C) 100 95 % 18 144/83 8 -- EM            Physical Exam  Vitals and nursing note reviewed.   Constitutional:       General: He is not in acute distress.     Appearance: He is well-developed.   HENT:      Head: Normocephalic and atraumatic.   Eyes:      Conjunctiva/sclera: Conjunctivae normal.   Cardiovascular:      Rate and Rhythm: Normal rate and regular rhythm.      Heart sounds: No murmur heard.  Pulmonary:      Effort: Pulmonary effort is normal. No respiratory distress.      Breath sounds: Wheezing  present.      Comments: Increased work of breathing which patient says is at his baseline.  Abdominal:      Palpations: Abdomen is soft.      Tenderness: There is abdominal tenderness (Diffuse abdominal tenderness with increasing tenderness in the bilateral lower quadrants).   Musculoskeletal:         General: No swelling.      Cervical back: Neck supple.   Skin:     General: Skin is warm and dry.      Capillary Refill: Capillary refill takes less than 2 seconds.   Neurological:      Mental Status: He is alert.   Psychiatric:         Mood and Affect: Mood normal.         Results Reviewed       Procedure Component Value Units Date/Time    Platelet count [[id number]]  (Normal) Collected: 11/02/24 0136    Lab Status: Final result Specimen: Blood from Arm, Right Updated: 11/02/24 0147     Platelets 191 Thousands/uL      MPV 10.4 fL     Blood gas, venous [[id number]]  (Abnormal) Collected: 11/02/24 0136    Lab Status: Final result Specimen: Blood from Arm, Right Updated: 11/02/24 0143     pH, Ravindra 7.345     pCO2, Ravindra 55.6 mm Hg      pO2, Ravindra 46.4 mm Hg      HCO3, Ravindra 29.7 mmol/L      Base Excess, Ravindra 2.9 mmol/L      O2 Content, Ravindra 12.9 ml/dL      O2 HGB, VENOUS 77.8 %     Basic metabolic panel [[id number]] Collected: 11/02/24 0136    Lab Status: In process Specimen: Blood from Arm, Right Updated: 11/02/24 0141    Lactic acid, plasma (w/reflex if result > 2.0) [[id number]] Collected: 11/02/24 0136    Lab Status: In process Specimen: Blood from Arm, Right Updated: 11/02/24 0141    Blood gas, venous [[id number]]  (Abnormal) Collected: 11/01/24 2321    Lab Status: Final result Specimen: Blood from Arm, Left Updated: 11/01/24 2335     pH, Ravindra 7.368     pCO2, Ravindra 49.5 mm Hg      pO2, Ravindra 49.1 mm Hg      HCO3, Ravindra 27.8 mmol/L      Base Excess, Ravindra 1.9 mmol/L      O2 Content, Ravindra 13.8 ml/dL      O2 HGB, VENOUS 81.8 %     Blood culture #1 [[id number]] Collected: 11/01/24 2321    Lab Status: In process Specimen: Blood from Arm, Left  Updated: 11/01/24 2331    Blood culture #2 [[id number]] Collected: 11/01/24 2321    Lab Status: In process Specimen: Blood from Arm, Left Updated: 11/01/24 2331    Lactic acid, plasma (w/reflex if result > 2.0) [[id number]]  (Normal) Collected: 11/01/24 2155    Lab Status: Final result Specimen: Blood from Arm, Left Updated: 11/01/24 2219     LACTIC ACID 0.7 mmol/L     Narrative:      Result may be elevated if tourniquet was used during collection.    Urine Microscopic [[id number]]  (Abnormal) Collected: 11/01/24 2052    Lab Status: Final result Specimen: Urine, Clean Catch Updated: 11/01/24 2138     RBC, UA None Seen /hpf      WBC, UA Innumerable /hpf      Epithelial Cells Occasional /hpf      Bacteria, UA Occasional /hpf      MUCUS THREADS Moderate     Hyaline Casts, UA 3-5 /lpf     Urine culture [[id number]] Collected: 11/01/24 2052    Lab Status: In process Specimen: Urine, Clean Catch Updated: 11/01/24 2138    UA w Reflex to Microscopic w Reflex to Culture [[id number]]  (Abnormal) Collected: 11/01/24 2052    Lab Status: Final result Specimen: Urine, Clean Catch Updated: 11/01/24 2126     Color, UA Yellow     Clarity, UA Turbid     Specific Gravity, UA 1.024     pH, UA 5.5     Leukocytes, UA Large     Nitrite, UA Positive     Protein, UA 30 (1+) mg/dl      Glucose, UA Negative mg/dl      Ketones, UA Negative mg/dl      Urobilinogen, UA <2.0 mg/dl      Bilirubin, UA Negative     Occult Blood, UA Negative    Comprehensive metabolic panel [[id number]]  (Abnormal) Collected: 11/01/24 1703    Lab Status: Final result Specimen: Blood from Arm, Left Updated: 11/01/24 1733     Sodium 141 mmol/L      Potassium 4.3 mmol/L      Chloride 104 mmol/L      CO2 29 mmol/L      ANION GAP 8 mmol/L      BUN 19 mg/dL      Creatinine 1.25 mg/dL      Glucose 125 mg/dL      Calcium 9.0 mg/dL      AST 12 U/L      ALT 13 U/L      Alkaline Phosphatase 84 U/L      Total Protein 6.8 g/dL      Albumin 4.1 g/dL      Total Bilirubin 0.53 mg/dL       eGFR 61 ml/min/1.73sq m     Narrative:      [employer] guidelines for Chronic Kidney Disease (CKD):     Stage 1 with normal or high GFR (GFR > 90 mL/min/1.73 square meters)    Stage 2 Mild CKD (GFR = 60-89 mL/min/1.73 square meters)    Stage 3A Moderate CKD (GFR = 45-59 mL/min/1.73 square meters)    Stage 3B Moderate CKD (GFR = 30-44 mL/min/1.73 square meters)    Stage 4 Severe CKD (GFR = 15-29 mL/min/1.73 square meters)    Stage 5 End Stage CKD (GFR <15 mL/min/1.73 square meters)  Note: GFR calculation is accurate only with a steady state creatinine    CBC and differential [[id number]]  (Abnormal) Collected: 11/01/24 1703    Lab Status: Final result Specimen: Blood from Arm, Left Updated: 11/01/24 1716     WBC 14.63 Thousand/uL      RBC 4.81 Million/uL      Hemoglobin 12.3 g/dL      Hematocrit 39.7 %      MCV 83 fL      MCH 25.6 pg      MCHC 31.0 g/dL      RDW 18.3 %      MPV 10.5 fL      Platelets 226 Thousands/uL      nRBC 0 /100 WBCs      Segmented % 80 %      Immature Grans % 0 %      Lymphocytes % 11 %      Monocytes % 8 %      Eosinophils Relative 1 %      Basophils Relative 0 %      Absolute Neutrophils 11.67 Thousands/µL      Absolute Immature Grans 0.06 Thousand/uL      Absolute Lymphocytes 1.60 Thousands/µL      Absolute Monocytes 1.12 Thousand/µL      Eosinophils Absolute 0.14 Thousand/µL      Basophils Absolute 0.04 Thousands/µL             CT abdomen pelvis with contrast   Final Interpretation by Latrice Alvarez MD (11/01 2136)         1. Focally dilated small bowel loop in the mid and right abdomen, suggesting a high-grade obstruction which could be related to obstruction of the proximal and distal segments due to adhesions versus closed-loop obstruction. There is associated stranding    of the mesentery and small amount of fluid. No small bowel pneumatosis.   2. Stable findings consistent with bronchiolitis with right lower lobe lung nodule and impacted bronchus in the  right lower lobe. Follow-up evaluation was recommended at the time of CT chest in September 2024.      I personally discussed this study with Alonso Garzon on 11/1/2024 9:35 PM.               Workstation performed: [id number]         XR chest portable    (Results Pending)       Procedures    ED Medication and Procedure Management   Prior to Admission Medications   Prescriptions Last Dose Informant Patient Reported? Taking?   Multiple Vitamins-Minerals (Centrum Silver 50+Men) TABS  Self Yes No   Sig: Take by mouth   Nebulizers (AERONEB GO NEBULIZER HANDSET) MISC  Self No No   Sig: by Does not apply route 2 (two) times a day as needed (wheezing)   albuterol (2.5 mg/3 mL) 0.083 % nebulizer solution  Self No No   Sig: Take 3 mL (2.5 mg total) by nebulization every 4 (four) hours as needed for wheezing or shortness of breath   albuterol (PROVENTIL HFA,VENTOLIN HFA) 90 mcg/act inhaler  Self No No   Sig: Inhale 2 puffs every 4 (four) hours as needed for wheezing or shortness of breath   aspirin (Aspirin Low Dose) 81 mg chewable tablet   No No   Sig: CHEW 1 TABLET BY MOUTH EVERY DAY   atorvastatin (LIPITOR) 40 mg tablet   No No   Sig: TAKE 1 TABLET BY MOUTH EVERY DAY WITH DINNER   budesonide-formoterol (Symbicort) 160-4.5 mcg/act inhaler   No No   Sig: Inhale 2 puffs daily as needed (for shortness of breath) Rinse mouth after use.   fluticasone-umeclidinium-vilanterol (Trelegy Ellipta) 200-62.5-25 mcg/actuation AEPB inhaler   No No   Sig: Inhale 1 puff daily Rinse mouth after use.   ipratropium-albuterol (DUO-NEB) 0.5-2.5 mg/3 mL nebulizer solution   No No   Sig: Take 3 mL by nebulization 4 (four) times a day   metFORMIN (GLUCOPHAGE-XR) 750 mg 24 hr tablet  Self No No   Sig: Take 1 tablet (750 mg total) by mouth daily with breakfast   metoprolol succinate (TOPROL-XL) 25 mg 24 hr tablet  Self No No   Sig: TAKE 1 TABLET (25 MG TOTAL) BY MOUTH DAILY AT BEDTIME DAILY AT BEDTIME   nicotine (NICODERM CQ) 21 mg/24 hr TD 24 hr  patch  Self No No   Sig: Place 1 patch on the skin over 24 hours daily   pantoprazole (PROTONIX) 40 mg tablet  Self No No   Sig: TAKE 1 TABLET BY MOUTH EVERY DAY   sulfaSALAzine (AZULFIDINE) 500 mg tablet  Self No No   Sig: Take 1 tablet (500 mg total) by mouth 4 (four) times a day      Facility-Administered Medications: None     Current Discharge Medication List        CONTINUE these medications which have NOT CHANGED    Details   albuterol (2.5 mg/3 mL) 0.083 % nebulizer solution Take 3 mL (2.5 mg total) by nebulization every 4 (four) hours as needed for wheezing or shortness of breath  Qty: 375 mL, Refills: 5    Comments: DX Code Needed  .  Associated Diagnoses: Stage 4 very severe COPD by GOLD classification ([facility])      albuterol (PROVENTIL HFA,VENTOLIN HFA) 90 mcg/act inhaler Inhale 2 puffs every 4 (four) hours as needed for wheezing or shortness of breath  Qty: 18 g, Refills: 0    Comments: Substitution to a formulary equivalent within the same pharmaceutical class is authorized.  Associated Diagnoses: Stage 4 very severe COPD by GOLD classification ([facility])      aspirin (Aspirin Low Dose) 81 mg chewable tablet CHEW 1 TABLET BY MOUTH EVERY DAY  Qty: 90 tablet, Refills: 1    Associated Diagnoses: Coronary artery disease involving native coronary artery of native heart without angina pectoris      atorvastatin (LIPITOR) 40 mg tablet TAKE 1 TABLET BY MOUTH EVERY DAY WITH DINNER  Qty: 90 tablet, Refills: 1    Associated Diagnoses: Coronary artery disease involving native coronary artery of native heart without angina pectoris      budesonide-formoterol (Symbicort) 160-4.5 mcg/act inhaler Inhale 2 puffs daily as needed (for shortness of breath) Rinse mouth after use.  Qty: 10.2 g, Refills: 0    Associated Diagnoses: Stage 4 very severe COPD by GOLD classification ([facility])      fluticasone-umeclidinium-vilanterol (Trelegy Ellipta) 200-62.5-25 mcg/actuation AEPB inhaler Inhale 1 puff daily Rinse mouth after use.  Qty: 60  blister, Refills: 0    Associated Diagnoses: Stage 4 very severe COPD by GOLD classification ([facility])      ipratropium-albuterol (DUO-NEB) 0.5-2.5 mg/3 mL nebulizer solution Take 3 mL by nebulization 4 (four) times a day  Qty: 180 mL, Refills: 2    Associated Diagnoses: Stage 4 very severe COPD by GOLD classification ([facility])      metFORMIN (GLUCOPHAGE-XR) 750 mg 24 hr tablet Take 1 tablet (750 mg total) by mouth daily with breakfast  Qty: 100 tablet, Refills: 3    Associated Diagnoses: Type 2 diabetes mellitus with hyperglycemia, without long-term current use of insulin ([facility])      metoprolol succinate (TOPROL-XL) 25 mg 24 hr tablet TAKE 1 TABLET (25 MG TOTAL) BY MOUTH DAILY AT BEDTIME DAILY AT BEDTIME  Qty: 90 tablet, Refills: 1    Associated Diagnoses: Tachycardia; Coronary artery disease involving native coronary artery of native heart without angina pectoris      Multiple Vitamins-Minerals (Centrum Silver 50+Men) TABS Take by mouth      Nebulizers (AERONEB GO NEBULIZER HANDSET) MISC by Does not apply route 2 (two) times a day as needed (wheezing)  Qty: 1 each, Refills: 0    Associated Diagnoses: Uncomplicated asthma, unspecified asthma severity, unspecified whether persistent      nicotine (NICODERM CQ) 21 mg/24 hr TD 24 hr patch Place 1 patch on the skin over 24 hours daily  Qty: 28 patch, Refills: 0    Associated Diagnoses: Cigarette nicotine dependence without complication      pantoprazole (PROTONIX) 40 mg tablet TAKE 1 TABLET BY MOUTH EVERY DAY  Qty: 90 tablet, Refills: 1    Associated Diagnoses: GI bleeding      sulfaSALAzine (AZULFIDINE) 500 mg tablet Take 1 tablet (500 mg total) by mouth 4 (four) times a day  Qty: 360 tablet, Refills: 0    Associated Diagnoses: Crohn's disease with other complication, unspecified gastrointestinal tract location ([facility])           No discharge procedures on file.  ED SEPSIS DOCUMENTATION   Time reflects when diagnosis was documented in both MDM as applicable and the Disposition  within this note       Time User Action Codes Description Comment    11/1/2024  9:42 PM Alonso Garzon Add [K56.600] Partial small bowel obstruction (HCC)     11/1/2024 10:14 PM Jose Aguiar Add [K56.609] Intestinal obstruction, unspecified cause, unspecified whether partial or complete (HCC)     11/1/2024 11:58 PM Daphne Rojas Add [K56.609] Bowel obstruction (HCC)                  Alonso Garzon MD  11/02/24 0207

## 2024-11-02 ENCOUNTER — APPOINTMENT (INPATIENT)
Dept: RADIOLOGY | Facility: HOSPITAL | Age: 64
DRG: 389 | End: 2024-11-02
Payer: COMMERCIAL

## 2024-11-02 LAB
ABO GROUP BLD: NORMAL
ALBUMIN SERPL BCG-MCNC: 3.7 G/DL (ref 3.5–5)
ALP SERPL-CCNC: 81 U/L (ref 34–104)
ALT SERPL W P-5'-P-CCNC: 12 U/L (ref 7–52)
ANION GAP SERPL CALCULATED.3IONS-SCNC: 11 MMOL/L (ref 4–13)
ANION GAP SERPL CALCULATED.3IONS-SCNC: 7 MMOL/L (ref 4–13)
ANION GAP SERPL CALCULATED.3IONS-SCNC: 7 MMOL/L (ref 4–13)
ANION GAP SERPL CALCULATED.3IONS-SCNC: 9 MMOL/L (ref 4–13)
AST SERPL W P-5'-P-CCNC: 13 U/L (ref 13–39)
BASE EX.OXY STD BLDV CALC-SCNC: 47.6 % (ref 60–80)
BASE EX.OXY STD BLDV CALC-SCNC: 74.4 % (ref 60–80)
BASE EX.OXY STD BLDV CALC-SCNC: 77.8 % (ref 60–80)
BASE EXCESS BLDV CALC-SCNC: 1.9 MMOL/L
BASE EXCESS BLDV CALC-SCNC: 2 MMOL/L
BASE EXCESS BLDV CALC-SCNC: 2.9 MMOL/L
BASOPHILS # BLD AUTO: 0.04 THOUSANDS/ΜL (ref 0–0.1)
BASOPHILS # BLD AUTO: 0.05 THOUSANDS/ΜL (ref 0–0.1)
BASOPHILS NFR BLD AUTO: 0 % (ref 0–1)
BASOPHILS NFR BLD AUTO: 0 % (ref 0–1)
BILIRUB SERPL-MCNC: 0.47 MG/DL (ref 0.2–1)
BLD GP AB SCN SERPL QL: NEGATIVE
BUN SERPL-MCNC: 18 MG/DL (ref 5–25)
BUN SERPL-MCNC: 19 MG/DL (ref 5–25)
BUN SERPL-MCNC: 20 MG/DL (ref 5–25)
BUN SERPL-MCNC: 22 MG/DL (ref 5–25)
CALCIUM SERPL-MCNC: 8.4 MG/DL (ref 8.4–10.2)
CALCIUM SERPL-MCNC: 8.5 MG/DL (ref 8.4–10.2)
CALCIUM SERPL-MCNC: 8.7 MG/DL (ref 8.4–10.2)
CALCIUM SERPL-MCNC: 8.9 MG/DL (ref 8.4–10.2)
CHLORIDE SERPL-SCNC: 100 MMOL/L (ref 96–108)
CHLORIDE SERPL-SCNC: 102 MMOL/L (ref 96–108)
CHLORIDE SERPL-SCNC: 103 MMOL/L (ref 96–108)
CHLORIDE SERPL-SCNC: 103 MMOL/L (ref 96–108)
CO2 SERPL-SCNC: 29 MMOL/L (ref 21–32)
CO2 SERPL-SCNC: 31 MMOL/L (ref 21–32)
CREAT SERPL-MCNC: 1.3 MG/DL (ref 0.6–1.3)
CREAT SERPL-MCNC: 1.31 MG/DL (ref 0.6–1.3)
CREAT SERPL-MCNC: 1.32 MG/DL (ref 0.6–1.3)
CREAT SERPL-MCNC: 1.53 MG/DL (ref 0.6–1.3)
EOSINOPHIL # BLD AUTO: 0.03 THOUSAND/ΜL (ref 0–0.61)
EOSINOPHIL # BLD AUTO: 0.16 THOUSAND/ΜL (ref 0–0.61)
EOSINOPHIL NFR BLD AUTO: 0 % (ref 0–6)
EOSINOPHIL NFR BLD AUTO: 1 % (ref 0–6)
ERYTHROCYTE [DISTWIDTH] IN BLOOD BY AUTOMATED COUNT: 18.1 % (ref 11.6–15.1)
ERYTHROCYTE [DISTWIDTH] IN BLOOD BY AUTOMATED COUNT: 18.1 % (ref 11.6–15.1)
GFR SERPL CREATININE-BSD FRML MDRD: 48 ML/MIN/1.73SQ M
GFR SERPL CREATININE-BSD FRML MDRD: 57 ML/MIN/1.73SQ M
GFR SERPL CREATININE-BSD FRML MDRD: 57 ML/MIN/1.73SQ M
GFR SERPL CREATININE-BSD FRML MDRD: 58 ML/MIN/1.73SQ M
GLUCOSE SERPL-MCNC: 104 MG/DL (ref 65–140)
GLUCOSE SERPL-MCNC: 106 MG/DL (ref 65–140)
GLUCOSE SERPL-MCNC: 107 MG/DL (ref 65–140)
GLUCOSE SERPL-MCNC: 114 MG/DL (ref 65–140)
GLUCOSE SERPL-MCNC: 116 MG/DL (ref 65–140)
GLUCOSE SERPL-MCNC: 122 MG/DL (ref 65–140)
GLUCOSE SERPL-MCNC: 95 MG/DL (ref 65–140)
HCO3 BLDV-SCNC: 27.7 MMOL/L (ref 24–30)
HCO3 BLDV-SCNC: 29.7 MMOL/L (ref 24–30)
HCO3 BLDV-SCNC: 30.4 MMOL/L (ref 24–30)
HCT VFR BLD AUTO: 36.7 % (ref 36.5–49.3)
HCT VFR BLD AUTO: 40.7 % (ref 36.5–49.3)
HGB BLD-MCNC: 11.3 G/DL (ref 12–17)
HGB BLD-MCNC: 12.4 G/DL (ref 12–17)
IMM GRANULOCYTES # BLD AUTO: 0.06 THOUSAND/UL (ref 0–0.2)
IMM GRANULOCYTES # BLD AUTO: 0.09 THOUSAND/UL (ref 0–0.2)
IMM GRANULOCYTES NFR BLD AUTO: 1 % (ref 0–2)
IMM GRANULOCYTES NFR BLD AUTO: 1 % (ref 0–2)
LACTATE SERPL-SCNC: 0.8 MMOL/L (ref 0.5–2)
LACTATE SERPL-SCNC: 0.8 MMOL/L (ref 0.5–2)
LACTATE SERPL-SCNC: 2 MMOL/L (ref 0.5–2)
LACTATE SERPL-SCNC: 2.9 MMOL/L (ref 0.5–2)
LYMPHOCYTES # BLD AUTO: 1.02 THOUSANDS/ΜL (ref 0.6–4.47)
LYMPHOCYTES # BLD AUTO: 1.55 THOUSANDS/ΜL (ref 0.6–4.47)
LYMPHOCYTES NFR BLD AUTO: 12 % (ref 14–44)
LYMPHOCYTES NFR BLD AUTO: 6 % (ref 14–44)
MAGNESIUM SERPL-MCNC: 1.5 MG/DL (ref 1.9–2.7)
MAGNESIUM SERPL-MCNC: 2.7 MG/DL (ref 1.9–2.7)
MCH RBC QN AUTO: 25.4 PG (ref 26.8–34.3)
MCH RBC QN AUTO: 25.4 PG (ref 26.8–34.3)
MCHC RBC AUTO-ENTMCNC: 30.5 G/DL (ref 31.4–37.4)
MCHC RBC AUTO-ENTMCNC: 30.8 G/DL (ref 31.4–37.4)
MCV RBC AUTO: 83 FL (ref 82–98)
MCV RBC AUTO: 83 FL (ref 82–98)
MONOCYTES # BLD AUTO: 1.23 THOUSAND/ΜL (ref 0.17–1.22)
MONOCYTES # BLD AUTO: 1.67 THOUSAND/ΜL (ref 0.17–1.22)
MONOCYTES NFR BLD AUTO: 10 % (ref 4–12)
MONOCYTES NFR BLD AUTO: 9 % (ref 4–12)
NEUTROPHILS # BLD AUTO: 15.3 THOUSANDS/ΜL (ref 1.85–7.62)
NEUTROPHILS # BLD AUTO: 9.47 THOUSANDS/ΜL (ref 1.85–7.62)
NEUTS SEG NFR BLD AUTO: 76 % (ref 43–75)
NEUTS SEG NFR BLD AUTO: 84 % (ref 43–75)
NRBC BLD AUTO-RTO: 0 /100 WBCS
NRBC BLD AUTO-RTO: 0 /100 WBCS
O2 CT BLDV-SCNC: 12.9 ML/DL
O2 CT BLDV-SCNC: 13.9 ML/DL
O2 CT BLDV-SCNC: 8.7 ML/DL
PCO2 BLDV: 47.8 MM HG (ref 42–50)
PCO2 BLDV: 55.6 MM HG (ref 42–50)
PCO2 BLDV: 66.6 MM HG (ref 42–50)
PH BLDV: 7.28 [PH] (ref 7.3–7.4)
PH BLDV: 7.34 [PH] (ref 7.3–7.4)
PH BLDV: 7.38 [PH] (ref 7.3–7.4)
PHOSPHATE SERPL-MCNC: 3.2 MG/DL (ref 2.3–4.1)
PHOSPHATE SERPL-MCNC: 4.3 MG/DL (ref 2.3–4.1)
PLATELET # BLD AUTO: 191 THOUSANDS/UL (ref 149–390)
PLATELET # BLD AUTO: 218 THOUSANDS/UL (ref 149–390)
PLATELET # BLD AUTO: 220 THOUSANDS/UL (ref 149–390)
PMV BLD AUTO: 10.3 FL (ref 8.9–12.7)
PMV BLD AUTO: 10.4 FL (ref 8.9–12.7)
PMV BLD AUTO: 10.7 FL (ref 8.9–12.7)
PO2 BLDV: 29.5 MM HG (ref 35–45)
PO2 BLDV: 40.9 MM HG (ref 35–45)
PO2 BLDV: 46.4 MM HG (ref 35–45)
POTASSIUM SERPL-SCNC: 3.9 MMOL/L (ref 3.5–5.3)
POTASSIUM SERPL-SCNC: 4.3 MMOL/L (ref 3.5–5.3)
POTASSIUM SERPL-SCNC: 4.7 MMOL/L (ref 3.5–5.3)
POTASSIUM SERPL-SCNC: 5 MMOL/L (ref 3.5–5.3)
PROT SERPL-MCNC: 6.6 G/DL (ref 6.4–8.4)
RBC # BLD AUTO: 4.45 MILLION/UL (ref 3.88–5.62)
RBC # BLD AUTO: 4.88 MILLION/UL (ref 3.88–5.62)
RH BLD: POSITIVE
SODIUM SERPL-SCNC: 140 MMOL/L (ref 135–147)
SODIUM SERPL-SCNC: 141 MMOL/L (ref 135–147)
SODIUM SERPL-SCNC: 141 MMOL/L (ref 135–147)
SODIUM SERPL-SCNC: 142 MMOL/L (ref 135–147)
SPECIMEN EXPIRATION DATE: NORMAL
WBC # BLD AUTO: 12.51 THOUSAND/UL (ref 4.31–10.16)
WBC # BLD AUTO: 18.16 THOUSAND/UL (ref 4.31–10.16)

## 2024-11-02 PROCEDURE — 94640 AIRWAY INHALATION TREATMENT: CPT

## 2024-11-02 PROCEDURE — 94664 DEMO&/EVAL PT USE INHALER: CPT

## 2024-11-02 PROCEDURE — 82805 BLOOD GASES W/O2 SATURATION: CPT

## 2024-11-02 PROCEDURE — 83605 ASSAY OF LACTIC ACID: CPT

## 2024-11-02 PROCEDURE — 99232 SBSQ HOSP IP/OBS MODERATE 35: CPT | Performed by: SURGERY

## 2024-11-02 PROCEDURE — 82948 REAGENT STRIP/BLOOD GLUCOSE: CPT

## 2024-11-02 PROCEDURE — 85025 COMPLETE CBC W/AUTO DIFF WBC: CPT

## 2024-11-02 PROCEDURE — 74018 RADEX ABDOMEN 1 VIEW: CPT

## 2024-11-02 PROCEDURE — 99222 1ST HOSP IP/OBS MODERATE 55: CPT | Performed by: STUDENT IN AN ORGANIZED HEALTH CARE EDUCATION/TRAINING PROGRAM

## 2024-11-02 PROCEDURE — 80048 BASIC METABOLIC PNL TOTAL CA: CPT

## 2024-11-02 PROCEDURE — 83735 ASSAY OF MAGNESIUM: CPT

## 2024-11-02 PROCEDURE — 80048 BASIC METABOLIC PNL TOTAL CA: CPT | Performed by: PHYSICIAN ASSISTANT

## 2024-11-02 PROCEDURE — 80053 COMPREHEN METABOLIC PANEL: CPT

## 2024-11-02 PROCEDURE — 84100 ASSAY OF PHOSPHORUS: CPT

## 2024-11-02 PROCEDURE — 94760 N-INVAS EAR/PLS OXIMETRY 1: CPT

## 2024-11-02 PROCEDURE — 85049 AUTOMATED PLATELET COUNT: CPT

## 2024-11-02 RX ORDER — METFORMIN HYDROCHLORIDE 750 MG/1
750 TABLET, EXTENDED RELEASE ORAL
Status: DISCONTINUED | OUTPATIENT
Start: 2024-11-02 | End: 2024-11-02

## 2024-11-02 RX ORDER — PANTOPRAZOLE SODIUM 40 MG/1
40 TABLET, DELAYED RELEASE ORAL DAILY
Status: DISCONTINUED | OUTPATIENT
Start: 2024-11-02 | End: 2024-11-02

## 2024-11-02 RX ORDER — ONDANSETRON 2 MG/ML
4 INJECTION INTRAMUSCULAR; INTRAVENOUS EVERY 6 HOURS PRN
Status: DISCONTINUED | OUTPATIENT
Start: 2024-11-02 | End: 2024-11-05 | Stop reason: HOSPADM

## 2024-11-02 RX ORDER — ACETAMINOPHEN 10 MG/ML
1000 INJECTION, SOLUTION INTRAVENOUS ONCE
Status: COMPLETED | OUTPATIENT
Start: 2024-11-02 | End: 2024-11-02

## 2024-11-02 RX ORDER — SODIUM CHLORIDE, SODIUM GLUCONATE, SODIUM ACETATE, POTASSIUM CHLORIDE, MAGNESIUM CHLORIDE, SODIUM PHOSPHATE, DIBASIC, AND POTASSIUM PHOSPHATE .53; .5; .37; .037; .03; .012; .00082 G/100ML; G/100ML; G/100ML; G/100ML; G/100ML; G/100ML; G/100ML
1000 INJECTION, SOLUTION INTRAVENOUS ONCE
Status: COMPLETED | OUTPATIENT
Start: 2024-11-02 | End: 2024-11-02

## 2024-11-02 RX ORDER — IPRATROPIUM BROMIDE AND ALBUTEROL SULFATE 2.5; .5 MG/3ML; MG/3ML
3 SOLUTION RESPIRATORY (INHALATION) 4 TIMES DAILY
Status: DISCONTINUED | OUTPATIENT
Start: 2024-11-02 | End: 2024-11-02

## 2024-11-02 RX ORDER — FLUTICASONE FUROATE AND VILANTEROL 200; 25 UG/1; UG/1
1 POWDER RESPIRATORY (INHALATION) DAILY
Status: DISCONTINUED | OUTPATIENT
Start: 2024-11-02 | End: 2024-11-05 | Stop reason: HOSPADM

## 2024-11-02 RX ORDER — ALBUTEROL SULFATE 0.83 MG/ML
2.5 SOLUTION RESPIRATORY (INHALATION) EVERY 4 HOURS PRN
Status: DISCONTINUED | OUTPATIENT
Start: 2024-11-02 | End: 2024-11-04

## 2024-11-02 RX ORDER — LEVALBUTEROL INHALATION SOLUTION 1.25 MG/3ML
1.25 SOLUTION RESPIRATORY (INHALATION)
Status: DISCONTINUED | OUTPATIENT
Start: 2024-11-02 | End: 2024-11-05 | Stop reason: HOSPADM

## 2024-11-02 RX ORDER — INSULIN LISPRO 100 [IU]/ML
1-6 INJECTION, SOLUTION INTRAVENOUS; SUBCUTANEOUS EVERY 6 HOURS SCHEDULED
Status: DISCONTINUED | OUTPATIENT
Start: 2024-11-02 | End: 2024-11-05 | Stop reason: HOSPADM

## 2024-11-02 RX ORDER — MAGNESIUM SULFATE HEPTAHYDRATE 40 MG/ML
2 INJECTION, SOLUTION INTRAVENOUS ONCE
Status: COMPLETED | OUTPATIENT
Start: 2024-11-02 | End: 2024-11-02

## 2024-11-02 RX ORDER — ACETAMINOPHEN 325 MG/1
650 TABLET ORAL EVERY 4 HOURS PRN
Status: DISCONTINUED | OUTPATIENT
Start: 2024-11-02 | End: 2024-11-02

## 2024-11-02 RX ORDER — POTASSIUM CHLORIDE 1500 MG/1
20 TABLET, EXTENDED RELEASE ORAL ONCE
Status: DISCONTINUED | OUTPATIENT
Start: 2024-11-02 | End: 2024-11-02

## 2024-11-02 RX ORDER — SODIUM CHLORIDE, SODIUM GLUCONATE, SODIUM ACETATE, POTASSIUM CHLORIDE, MAGNESIUM CHLORIDE, SODIUM PHOSPHATE, DIBASIC, AND POTASSIUM PHOSPHATE .53; .5; .37; .037; .03; .012; .00082 G/100ML; G/100ML; G/100ML; G/100ML; G/100ML; G/100ML; G/100ML
75 INJECTION, SOLUTION INTRAVENOUS CONTINUOUS
Status: DISCONTINUED | OUTPATIENT
Start: 2024-11-02 | End: 2024-11-04

## 2024-11-02 RX ORDER — POTASSIUM CHLORIDE 14.9 MG/ML
20 INJECTION INTRAVENOUS ONCE
Status: COMPLETED | OUTPATIENT
Start: 2024-11-02 | End: 2024-11-02

## 2024-11-02 RX ORDER — ASPIRIN 81 MG/1
81 TABLET, CHEWABLE ORAL DAILY
Status: DISCONTINUED | OUTPATIENT
Start: 2024-11-02 | End: 2024-11-05 | Stop reason: HOSPADM

## 2024-11-02 RX ORDER — BUDESONIDE AND FORMOTEROL FUMARATE DIHYDRATE 160; 4.5 UG/1; UG/1
2 AEROSOL RESPIRATORY (INHALATION) DAILY PRN
Status: DISCONTINUED | OUTPATIENT
Start: 2024-11-02 | End: 2024-11-05 | Stop reason: HOSPADM

## 2024-11-02 RX ORDER — METOPROLOL SUCCINATE 25 MG/1
25 TABLET, EXTENDED RELEASE ORAL
Status: DISCONTINUED | OUTPATIENT
Start: 2024-11-02 | End: 2024-11-05 | Stop reason: HOSPADM

## 2024-11-02 RX ORDER — ALBUTEROL SULFATE 90 UG/1
2 INHALANT RESPIRATORY (INHALATION) EVERY 4 HOURS PRN
Status: DISCONTINUED | OUTPATIENT
Start: 2024-11-02 | End: 2024-11-05 | Stop reason: HOSPADM

## 2024-11-02 RX ORDER — DIATRIZOATE MEGLUMINE AND DIATRIZOATE SODIUM 660; 100 MG/ML; MG/ML
120 SOLUTION ORAL; RECTAL
Status: COMPLETED | OUTPATIENT
Start: 2024-11-02 | End: 2024-11-02

## 2024-11-02 RX ORDER — PANTOPRAZOLE SODIUM 40 MG/10ML
40 INJECTION, POWDER, LYOPHILIZED, FOR SOLUTION INTRAVENOUS
Status: DISCONTINUED | OUTPATIENT
Start: 2024-11-02 | End: 2024-11-05 | Stop reason: HOSPADM

## 2024-11-02 RX ORDER — ATORVASTATIN CALCIUM 40 MG/1
40 TABLET, FILM COATED ORAL
Status: DISCONTINUED | OUTPATIENT
Start: 2024-11-02 | End: 2024-11-05 | Stop reason: HOSPADM

## 2024-11-02 RX ORDER — HYDROMORPHONE HCL IN WATER/PF 6 MG/30 ML
0.2 PATIENT CONTROLLED ANALGESIA SYRINGE INTRAVENOUS EVERY 2 HOUR PRN
Status: DISCONTINUED | OUTPATIENT
Start: 2024-11-02 | End: 2024-11-05 | Stop reason: HOSPADM

## 2024-11-02 RX ORDER — NICOTINE 21 MG/24HR
1 PATCH, TRANSDERMAL 24 HOURS TRANSDERMAL DAILY
Status: DISCONTINUED | OUTPATIENT
Start: 2024-11-02 | End: 2024-11-05 | Stop reason: HOSPADM

## 2024-11-02 RX ORDER — ACETAMINOPHEN 10 MG/ML
1000 INJECTION, SOLUTION INTRAVENOUS EVERY 6 HOURS SCHEDULED
Status: DISCONTINUED | OUTPATIENT
Start: 2024-11-02 | End: 2024-11-04

## 2024-11-02 RX ADMIN — ALBUTEROL SULFATE 2 PUFF: 90 AEROSOL, METERED RESPIRATORY (INHALATION) at 12:31

## 2024-11-02 RX ADMIN — SODIUM CHLORIDE, SODIUM GLUCONATE, SODIUM ACETATE, POTASSIUM CHLORIDE, MAGNESIUM CHLORIDE, SODIUM PHOSPHATE, DIBASIC, AND POTASSIUM PHOSPHATE 75 ML/HR: .53; .5; .37; .037; .03; .012; .00082 INJECTION, SOLUTION INTRAVENOUS at 01:19

## 2024-11-02 RX ADMIN — ALBUTEROL SULFATE 2.5 MG: 2.5 SOLUTION RESPIRATORY (INHALATION) at 02:55

## 2024-11-02 RX ADMIN — HYDROMORPHONE HYDROCHLORIDE 0.2 MG: 0.2 INJECTION, SOLUTION INTRAMUSCULAR; INTRAVENOUS; SUBCUTANEOUS at 12:45

## 2024-11-02 RX ADMIN — HYDROMORPHONE HYDROCHLORIDE 0.2 MG: 0.2 INJECTION, SOLUTION INTRAMUSCULAR; INTRAVENOUS; SUBCUTANEOUS at 10:04

## 2024-11-02 RX ADMIN — LEVALBUTEROL HYDROCHLORIDE 1.25 MG: 1.25 SOLUTION RESPIRATORY (INHALATION) at 08:13

## 2024-11-02 RX ADMIN — POTASSIUM CHLORIDE 20 MEQ: 14.9 INJECTION, SOLUTION INTRAVENOUS at 10:05

## 2024-11-02 RX ADMIN — CHLORHEXIDINE GLUCONATE 0.12% ORAL RINSE 15 ML: 1.2 LIQUID ORAL at 20:32

## 2024-11-02 RX ADMIN — MAGNESIUM SULFATE HEPTAHYDRATE 2 G: 40 INJECTION, SOLUTION INTRAVENOUS at 08:06

## 2024-11-02 RX ADMIN — ACETAMINOPHEN 1000 MG: 10 INJECTION INTRAVENOUS at 23:37

## 2024-11-02 RX ADMIN — ONDANSETRON 4 MG: 2 INJECTION INTRAMUSCULAR; INTRAVENOUS at 14:41

## 2024-11-02 RX ADMIN — FLUTICASONE FUROATE AND VILANTEROL TRIFENATATE 1 PUFF: 200; 25 POWDER RESPIRATORY (INHALATION) at 12:34

## 2024-11-02 RX ADMIN — LEVALBUTEROL HYDROCHLORIDE 1.25 MG: 1.25 SOLUTION RESPIRATORY (INHALATION) at 20:23

## 2024-11-02 RX ADMIN — PANTOPRAZOLE SODIUM 40 MG: 40 INJECTION, POWDER, FOR SOLUTION INTRAVENOUS at 10:04

## 2024-11-02 RX ADMIN — UMECLIDINIUM 1 PUFF: 62.5 AEROSOL, POWDER ORAL at 12:34

## 2024-11-02 RX ADMIN — HYDROMORPHONE HYDROCHLORIDE 0.2 MG: 0.2 INJECTION, SOLUTION INTRAMUSCULAR; INTRAVENOUS; SUBCUTANEOUS at 08:04

## 2024-11-02 RX ADMIN — CHLORHEXIDINE GLUCONATE 0.12% ORAL RINSE 15 ML: 1.2 LIQUID ORAL at 10:30

## 2024-11-02 RX ADMIN — HYDROMORPHONE HYDROCHLORIDE 0.2 MG: 0.2 INJECTION, SOLUTION INTRAMUSCULAR; INTRAVENOUS; SUBCUTANEOUS at 14:20

## 2024-11-02 RX ADMIN — NICOTINE 1 PATCH: 21 PATCH, EXTENDED RELEASE TRANSDERMAL at 08:05

## 2024-11-02 RX ADMIN — BUDESONIDE AND FORMOTEROL FUMARATE DIHYDRATE 2 PUFF: 160; 4.5 AEROSOL RESPIRATORY (INHALATION) at 12:31

## 2024-11-02 RX ADMIN — IPRATROPIUM BROMIDE 0.5 MG: 0.5 SOLUTION RESPIRATORY (INHALATION) at 08:13

## 2024-11-02 RX ADMIN — DIATRIZOATE MEGLUMINE AND DIATRIZOATE SODIUM 120 ML: 660; 100 LIQUID ORAL; RECTAL at 12:00

## 2024-11-02 RX ADMIN — HYDROMORPHONE HYDROCHLORIDE 0.2 MG: 0.2 INJECTION, SOLUTION INTRAMUSCULAR; INTRAVENOUS; SUBCUTANEOUS at 16:20

## 2024-11-02 RX ADMIN — IPRATROPIUM BROMIDE 0.5 MG: 0.5 SOLUTION RESPIRATORY (INHALATION) at 20:23

## 2024-11-02 RX ADMIN — SODIUM CHLORIDE, SODIUM GLUCONATE, SODIUM ACETATE, POTASSIUM CHLORIDE, MAGNESIUM CHLORIDE, SODIUM PHOSPHATE, DIBASIC, AND POTASSIUM PHOSPHATE 1000 ML: .53; .5; .37; .037; .03; .012; .00082 INJECTION, SOLUTION INTRAVENOUS at 20:31

## 2024-11-02 RX ADMIN — ACETAMINOPHEN 1000 MG: 10 INJECTION INTRAVENOUS at 16:20

## 2024-11-02 RX ADMIN — ENOXAPARIN SODIUM 40 MG: 40 INJECTION SUBCUTANEOUS at 10:30

## 2024-11-02 NOTE — RESPIRATORY THERAPY NOTE
RT Protocol Note  Mathew Bird 64 y.o. male MRN: [id number]  [facility] Encounter: [id number]    Assessment    Active Problems:    Bowel obstruction (HCC)      Home Pulmonary Medications:  Albuterol, duoneb, symbicort, trelegy       Past Medical History:   Diagnosis Date    Alcohol abuse 05/15/2018    Anxiety     Asthma     Cardiac disease     Cervical stenosis (uterine cervix)     Chest pain     Chronic kidney disease     Colitis     Colon polyps     COPD (chronic obstructive pulmonary disease) ([facility])     2L @ HS and PRN    Coronary artery disease     Diverticulitis     Esophageal reflux     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HCC)     Heart failure, systolic, due to idiopathic cardiomyopathy ([facility]) 05/21/2018    Hyperlipidemia     Hypertension     IBD (inflammatory bowel disease)     Myocardial infarction ([facility])     Old myocardial infarction     Paroxysmal atrial fibrillation ([facility]) 08/24/2018    Perforation of colon ([facility])     Pericarditis 12/07/2016    Type 2 diabetes, diet controlled ([facility])     Ulcerative colitis ([facility])     VT (ventricular tachycardia) ([facility]) 12/08/2023     Social History     Socioeconomic History    Marital status:      Spouse name: None    Number of children: None    Years of education: None    Highest education level: None   Occupational History    None   Tobacco Use    Smoking status: Every Day     Types: Cigars     Passive exposure: Past    Smokeless tobacco: Never    Tobacco comments:     4-5cigars per day before nowadays smokes about 1 cigar, trying to cut down.   Vaping Use    Vaping status: Never Used   Substance and Sexual Activity    Alcohol use: Not Currently     Comment: last drink may 2023    Drug use: No    Sexual activity: Yes     Partners: Female     Birth control/protection: None   Other Topics Concern    None   Social History Narrative    None     Social Determinants of Health     Financial Resource Strain: Not on file   Food Insecurity: No Food Insecurity (9/3/2024)     Nursing - Inadequate Food Risk Classification     Worried About Running Out of Food in the Last Year: Never true     Ran Out of Food in the Last Year: Never true     Ran Out of Food in the Last Year: Not on file   Transportation Needs: No Transportation Needs (9/3/2024)    PRAPARE - Transportation     Lack of Transportation (Medical): No     Lack of Transportation (Non-Medical): No   Physical Activity: Not on file   Stress: Not on file   Social Connections: Not on file   Intimate Partner Violence: Not on file   Housing Stability: Low Risk  (9/3/2024)    Housing Stability Vital Sign     Unable to Pay for Housing in the Last Year: No     Number of Times Moved in the Last Year: 0     Homeless in the Last Year: No       Subjective         Objective    Physical Exam:   Assessment Type: Assess only  Bilateral Breath Sounds: Diminished, Expiratory wheezes    Vitals:  Blood pressure 106/67, pulse (!) 114, temperature 98.4 °F (36.9 °C), temperature source Oral, resp. rate 21, SpO2 (P) 98%.          Imaging and other studies:           Plan    Respiratory Plan: Home Bronchodilator Patient pathway        Resp Comments: (P) Pt. evaluated for respiratory protocol. BS=decreased with expiratory wheezes. Pt. in mild distress on 3lpm nasal cannula. SpO2= 98%. Pt. uses duoneb, symbicort, trelegy and albuterol PRN at home. Pt. has no recent resulted CXR. will continue PRN UDN and change the scheduled txs to xopenex and atrovent per protocol.

## 2024-11-02 NOTE — ASSESSMENT & PLAN NOTE
64-year-old man with abdominal pain. Hx of Crohn's s/p ileal colectomy with anastomosis and  ileostomy which as been reversed, CHF, CAD s/p stents, COPD, CKD, T2DM, Afib.  Intermittent abdominal pain starting this morning.  No nausea or vomiting, last bowel movement 1 hour prior to arrival.  Tender in right lower quadrant with no rebound or guarding, distended.  Tachycardic to 116, rest of vital signs stable on 4L NC.  WBC 14.63. CT AP shows concerns for high-grade small bowel obstruction with  associated stranding   of the mesentery and small amount of fluid. No small bowel pneumatosis.  Concerns for closed-loop obstruction.    Plan  -OR for Ex lap, possible bowel resection   -NPO  -NGT insertion  -IV fluids  -Pain/nausea control

## 2024-11-02 NOTE — QUICK NOTE
Abdominal check    Patient seen at bedside, not in acute distress.  Patient reports that abdominal pain is on the change from early however he appears visibly more comfortable.  Abdomen soft, mildly tender with deep palpation in the inferior aspect along his belt line, moderate distention.  Will continue to monitor.    Jose Aguiar MD  11/2/2024 1:46 AM

## 2024-11-02 NOTE — ED ATTENDING ATTESTATION
11/1/2024  I, Tirso Leon MD, saw and evaluated the patient. I have discussed the patient with the resident/non-physician practitioner and agree with the resident's/non-physician practitioner's findings, Plan of Care, and MDM as documented in the resident's/non-physician practitioner's note, except where noted. All available labs and Radiology studies were reviewed.  I was present for key portions of any procedure(s) performed by the resident/non-physician practitioner and I was immediately available to provide assistance.       At this point I agree with the current assessment done in the Emergency Department.  I have conducted an independent evaluation of this patient a history and physical is as follows:    ED Course     Patient presents for evaluation due to 1 day of lower abdominal pain that started while trying to have a bowel movement.  Patient does states that he is able to pass gas but has not had a bowel movement.  He denies any vomiting.  Patient has a history of bowel resection and colostomy that has since been reversed.  No fevers or urinary complaints.  Patient has a history of severe COPD and is complaining of some shortness of breath.  No additional complaints. A/P: Abdominal pain.  Given prior history of surgery, concern for possible obstruction, urinary tract infection, or other intra-abdominal pathology.  Will check labs and will CT abdomen pelvis.  Will also check urine.    Critical Care Time  Procedures

## 2024-11-02 NOTE — PLAN OF CARE
Problem: PAIN - ADULT  Goal: Verbalizes/displays adequate comfort level or baseline comfort level  Description: Interventions:  - Encourage patient to monitor pain and request assistance  - Assess pain using appropriate pain scale  - Administer analgesics based on type and severity of pain and evaluate response  - Implement non-pharmacological measures as appropriate and evaluate response  - Consider cultural and social influences on pain and pain management  - Notify physician/advanced practitioner if interventions unsuccessful or patient reports new pain  Outcome: Progressing     Problem: INFECTION - ADULT  Goal: Absence or prevention of progression during hospitalization  Description: INTERVENTIONS:  - Assess and monitor for signs and symptoms of infection  - Monitor lab/diagnostic results  - Monitor all insertion sites, i.e. indwelling lines, tubes, and drains  - Monitor endotracheal if appropriate and nasal secretions for changes in amount and color  - [place] appropriate cooling/warming therapies per order  - Administer medications as ordered  - Instruct and encourage patient and family to use good hand hygiene technique  - Identify and instruct in appropriate isolation precautions for identified infection/condition  Outcome: Progressing  Goal: Absence of fever/infection during neutropenic period  Description: INTERVENTIONS:  - Monitor WBC    Outcome: Progressing     Problem: SAFETY ADULT  Goal: Patient will remain free of falls  Description: INTERVENTIONS:  - Educate patient/family on patient safety including physical limitations  - Instruct patient to call for assistance with activity   - Consult OT/PT to assist with strengthening/mobility   - Keep Call bell within reach  - Keep bed low and locked with side rails adjusted as appropriate  - Keep care items and personal belongings within reach  - Initiate and maintain comfort rounds  - Make Fall Risk Sign visible to staff  - Offer Toileting every  Hours,  in advance of need  - Initiate/Maintain alarm  - Obtain necessary fall risk management equipment:   - Apply yellow socks and bracelet for high fall risk patients  - Consider moving patient to room near nurses station  Outcome: Progressing  Goal: Maintain or return to baseline ADL function  Description: INTERVENTIONS:  -  Assess patient's ability to carry out ADLs; assess patient's baseline for ADL function and identify physical deficits which impact ability to perform ADLs (bathing, care of mouth/teeth, toileting, grooming, dressing, etc.)  - Assess/evaluate cause of self-care deficits   - Assess range of motion  - Assess patient's mobility; develop plan if impaired  - Assess patient's need for assistive devices and provide as appropriate  - Encourage maximum independence but intervene and supervise when necessary  - Involve family in performance of ADLs  - Assess for home care needs following discharge   - Consider OT consult to assist with ADL evaluation and planning for discharge  - Provide patient education as appropriate  Outcome: Progressing  Goal: Maintains/Returns to pre admission functional level  Description: INTERVENTIONS:  - Perform AM-PAC 6 Click Basic Mobility/ Daily Activity assessment daily.  - Set and communicate daily mobility goal to care team and patient/family/caregiver.   - Collaborate with rehabilitation services on mobility goals if consulted  - Perform Range of Motion  times a day.  - Reposition patient every  hours.  - Dangle patient  times a day  - Stand patient  times a day  - Ambulate patient  times a day  - Out of bed to chair  times a day   - Out of bed for meal times a day  - Out of bed for toileting  - Record patient progress and toleration of activity level   Outcome: Progressing     Problem: DISCHARGE PLANNING  Goal: Discharge to home or other facility with appropriate resources  Description: INTERVENTIONS:  - Identify barriers to discharge w/patient and caregiver  - Arrange for  needed discharge resources and transportation as appropriate  - Identify discharge learning needs (meds, wound care, etc.)  - Arrange for interpretive services to assist at discharge as needed  - Refer to Case Management Department for coordinating discharge planning if the patient needs post-hospital services based on physician/advanced practitioner order or complex needs related to functional status, cognitive ability, or social support system  Outcome: Progressing     Problem: Knowledge Deficit  Goal: Patient/family/caregiver demonstrates understanding of disease process, treatment plan, medications, and discharge instructions  Description: Complete learning assessment and assess knowledge base.  Interventions:  - Provide teaching at level of understanding  - Provide teaching via preferred learning methods  Outcome: Progressing     Problem: Nutrition/Hydration-ADULT  Goal: Nutrient/Hydration intake appropriate for improving, restoring or maintaining nutritional needs  Description: Monitor and assess patient's nutrition/hydration status for malnutrition. Collaborate with interdisciplinary team and initiate plan and interventions as ordered.  Monitor patient's weight and dietary intake as ordered or per policy. Utilize nutrition screening tool and intervene as necessary. Determine patient's food preferences and provide high-protein, high-caloric foods as appropriate.     INTERVENTIONS:  - Monitor oral intake, urinary output, labs, and treatment plans  - Assess nutrition and hydration status and recommend course of action  - Evaluate amount of meals eaten  - Assist patient with eating if necessary   - Allow adequate time for meals  - Recommend/ encourage appropriate diets, oral nutritional supplements, and vitamin/mineral supplements  - Order, calculate, and assess calorie counts as needed  - Recommend, monitor, and adjust tube feedings and TPN/PPN based on assessed needs  - Assess need for intravenous fluids  -  Provide specific nutrition/hydration education as appropriate  - Include patient/family/caregiver in decisions related to nutrition  Outcome: Progressing

## 2024-11-02 NOTE — PROGRESS NOTES
Progress Note - Surgery-General   Name: Mathew Bird 64 y.o. male I MRN: [id number]  [facility] Date of Admission: 11/1/2024   Date of Service: 11/2/2024 I Hospital Day: 1    Assessment & Plan  Bowel obstruction (HCC)  64-year-old man with Hx of Crohn's s/p ileal colectomy with anastomosis and  ileostomy which as been reversed, here with concerns for closed-loop obstruction and UTI.     Tachycardic to 110s, rest of vital signs stable on 4L NC  Received 1 L bolus of fluid    Urine 250 cc   cc gastric    WBC 12.51 from 14.63  Hb 11.3 from 12.3  Cr 1.31 from 1.25  Lactate 0.8 from 0.8 from 0.7  Base excess 2.0 from 2.9 from 1.9    Plan  -If exams and labs worsen OR for Ex lap, possible bowel resection   -Continue NPO/NGT  -CT AP with PO contrast  -Continue IV fluids  -Home metoprolol Held, patient did not take dose prior to coming to hospital  -Continue abx for UTI  -F/u Blood cx & urine cx  -Pain/nausea control  -Replete electrolytes PRN    Surgery-General service will follow.    Subjective   Patient seen at bedside, not in acute distress.  Patient reports pain is only if he presses on his lower abdomen.  Patient denies nausea vomiting fevers chills or chest pain.  Patient has occasional shortness of breath.  Patient reports passing flatus overnight but no bowel movements except for 1 hour prior to his arrival to the hospital.  Patient is ambulating.  Patient is voiding without difficulty.    Objective :  Temp:  [98.4 °F (36.9 °C)] 98.4 °F (36.9 °C)  HR:  [100-116] 114  BP: (106-144)/(67-85) 106/67  Resp:  [18-21] 21  SpO2:  [95 %-98 %] 98 %  O2 Device: Nasal cannula  Nasal Cannula O2 Flow Rate (L/min):  [4 L/min] 4 L/min    I/O       None          Lines/Drains/Airways       Active Status       Name Placement date Placement time Site Days    NG/OG/Enteral Tube Nasogastric 18 Fr Left nare --  --  Left nare  --                  Physical Exam  Vitals and nursing note reviewed.   Constitutional:        General: He is not in acute distress.  HENT:      Head: Normocephalic and atraumatic.   Cardiovascular:      Rate and Rhythm: Tachycardia present.   Pulmonary:      Effort: Pulmonary effort is normal. No respiratory distress.   Abdominal:      Comments: Soft, mild tenderness near belt line, mild to moderate distention, midline incisional hernia with no skin changes   Neurological:      Mental Status: He is alert.           Lab Results: I have reviewed the following results:  Recent Labs     11/02/24  0510   WBC 12.51*   HGB 11.3*   HCT 36.7      SODIUM 140   K 3.9      CO2 31   BUN 18   CREATININE 1.32*   GLUC 116   MG 1.5*   PHOS 3.2   AST 13   ALT 12   ALB 3.7   TBILI 0.47   ALKPHOS 81   LACTICACID 0.8       Imaging Results Review: No pertinent imaging studies reviewed.  Other Study Results Review: No additional pertinent studies reviewed.    VTE Pharmacologic Prophylaxis: VTE covered by:  enoxaparin, Subcutaneous      VTE Mechanical Prophylaxis: sequential compression device

## 2024-11-02 NOTE — CASE MANAGEMENT
Case Management Assessment & Discharge Planning Note    Patient name Mathew Bird  [facility] - MRN [id number]  : 1960 Date 2024       Current Admission Date: 2024  Current Admission Diagnosis:Bowel obstruction (HCC)  Patient Active Problem List    Diagnosis Date Noted Date Diagnosed    Bowel obstruction (HCC) 2024     Abnormal CT of the chest 2024     Type 2 diabetes mellitus, without long-term current use of insulin ([facility]) 2024     Bronchitis 2024     Acute exacerbation of chronic obstructive pulmonary disease (COPD) ([facility]) 2024     Bladder wall thickening 09/15/2023     Multiple pulmonary nodules 2023     Chronic respiratory failure with hypoxia ([facility]) 2023     End stage COPD ([facility]) 2023     Abnormal liver function 2023     Status post reversal of ileostomy 10/25/2018     Presence of drug-eluting stent in right coronary artery 2018     Paroxysmal atrial fibrillation ([facility]) 2018     DEBBY (acute kidney injury) ([facility]) 2018     Macrocytic anemia 2018     Moderate protein-calorie malnutrition ([facility]) 2018     Elevated troponin 05/15/2018     Cigarette nicotine dependence without complication 05/15/2018     Stage 4 very severe COPD by GOLD classification ([facility]) 2017     Spinal stenosis of cervical region 2016     Coronary artery disease involving native coronary artery of native heart without angina pectoris 2016     Crohn disease ([facility]) 10/22/2016     Atherosclerosis of coronary artery 2016     Dyslipidemia 10/19/2013       LOS (days): 1  Geometric Mean LOS (GMLOS) (days):   Days to GMLOS:     OBJECTIVE:    Risk of Unplanned Readmission Score: 20.87         Current admission status: Inpatient       Preferred Pharmacy:   [id number] - [place], [place] - [address] [place] [address]  [place] [address]  Phone: [phone] Fax: [phone]    [facility]  [place] - [place] - [address]  [address]  [place] [address]  Phone: [phone] Fax: [phone]    Primary Care Provider: Mariola Doyle MD    Primary Insurance: [employer]  Secondary Insurance:     ASSESSMENT:  Active Health Care Proxies       Amparo Adame [facility] Representative - Child   Primary Phone: [phone] (Home)                 Patient Information  Admitted from:: Home  Mental Status: Alert  During Assessment patient was accompanied by: Not accompanied during assessment  Assessment information provided by:: Patient  Primary Caregiver: Self  Support Systems: Self, Family members, Daughter  County of Residence: [place]  What city do you live in?: [place]  Home entry access options. Select all that apply.: Stairs  Number of steps to enter home.: 2  Type of Current Residence: 2 story home  Upon entering residence, is there a bedroom on the main floor (no further steps)?: No  A bedroom is located on the following floor levels of residence (select all that apply):: 2nd Floor  Upon entering residence, is there a bathroom on the main floor (no further steps)?: No  Indicate which floors of current residence have a bathroom (select all the apply):: 2nd Floor  Number of steps to 2nd floor from main floor: One Flight  Living Arrangements: Lives w/ Daughter, Other (Comment)  Is patient a ?: No    Activities of Daily Living Prior to Admission  Functional Status: Independent  Completes ADLs independently?: Yes  Ambulates independently?: Yes  Does patient use assisted devices?: No  Does patient currently own DME?: No  Does patient have a history of Outpatient Therapy (PT/OT)?: No  Does the patient have a history of Short-Term Rehab?: No  Does patient have a history of HHC?: No  Does patient currently have HHC?: No    Patient Information Continued  Income Source: Employed  Does patient have prescription coverage?: Yes  Does patient receive dialysis treatments?:  No  Does patient have a history of substance abuse?: Yes  Historical substance use preference: Alcohol/ETOH, Other (states multiple types of drugs)  History of Withdrawal Symptoms: Denies past symptoms  Is patient currently in treatment for substance abuse?: N/A - sober  Does patient have a history of Mental Health Diagnosis?: No    Means of Transportation  Means of Transport to Appts:: Drives Self          DISCHARGE DETAILS:    Discharge planning discussed with:: Patient  Freedom of Choice: Yes  Comments - Freedom of Choice: Discussed FOC  CM contacted family/caregiver?: No- see comments (declined)       This CM introduced self and role,  lives with daughter in 2 story home, couple steps to enter, IADLs, drives self.

## 2024-11-02 NOTE — CONSULTS
"Consultation - Critical Care/ICU   Name: Mathew Bird 64 y.o. male I MRN: [id number]  Unit/Bed#: ED 08 I Date of Admission: 11/1/2024   Date of Service: 11/2/2024 I Hospital Day: 1   Consults  Physician Requesting Evaluation: Radha Bird DO   Reason for Evaluation / Principal Problem: Bowel Obstruction    I have discussed the above management plan in detail with the primary service.     Assessment & Plan   Neuro:   No active issues  Scheduled tylenol; dilaudid 0.2mg q2h PRN for pain control    CV:   Diagnosis: HTN, HLD  Plan:   Currently holding home metoprolol as normotensive - will likely resume later in the morning if endpoints stay clear throughout the night  Currently holding home asa 81mg daily  Continue home statin     Pulm:  Diagnosis: Stage IV COPD with FEV1 21%; Chronic hypoxic respiratory failure requiring home oxygen  Plan:   Continue baseline 3L NC  Maintain SpO2 88-92%  Continue Trelegy 200mcg 1 puff daily  Continue Symbicort  2 puffs PRN  Continue DuoNeb q4h PRN  Continue Proventil PRN  Nicotine patch     GI:   Diagnosis: Small Bowel Obstruction, Hx Crohn's disease, Hx ileocolectomy and ileocolonic anastomosis in 2018  Plan:   Given patient's COPD with FEV1 21%, patient is a poor surgical candidate with high risk for prolonged intubation. This was discussed with patient, who agrees with plan for conservative management at this time.   NGT decompression  NPO  Q6h VBG, BMP, lactic  Continue home Protonix   Zofran for nausea    :   Diagnosis: UTI  Plan:   Rocephin x3 days; narrow pending urine cx    F/E/N:   F: Isolyte @ 75 cc/h while NPO  E: Replete PRN for K > 4, phos > 3, Mg > 2  N: NPO    Heme/Onc:   No active issues  DVT PPX: SC Lovenox    Endo:   Diagnosis: T2DM  Plan:   Continue home metformin  SSI     ID:   Diagnosis: UTI  Plan:   Care per \"\"   F/u blood cx drawn 11/1  F/u urine cx    MSK/Skin:   No active issues  PT/OT    Disposition: Stepdown Level 1    History of Present Illness "   Mathew Bird is a 64 y.o.  male PMH Crohn's disease s/p ileocolonic resection and ileocolonic anastomosis in 2018, T2DM, COPD FEV1 21% who presents with RLQ and suprapubic abdominal pain x1 day.  Patient presented to the ER where he was found to have UTI. CT abdomen and pelvis showed multiple dilated small bowel loops with some fat stranding and minimal mesenteric fluid suggestive of obstruction due to adhesions vs closed loop obstruction per CT read. Due to patient's lung function, it was explained to him that intubating him for surgery poses a high risk for difficulty extubating him post operatively. Shared decision making to pursue conservative management for now with NGT placement to decompress bowel and trending end points overnight.      History obtained from the patient.  Review of Systems: Review of Systems   Constitutional:  Positive for appetite change. Negative for activity change and fever.   HENT:  Negative for congestion.    Respiratory:  Positive for shortness of breath and wheezing. Negative for cough and chest tightness.    Gastrointestinal:  Positive for abdominal distention and abdominal pain. Negative for constipation, nausea and vomiting.   Skin:  Negative for color change.   Neurological:  Negative for dizziness and light-headedness.       Historical Information   Past Medical History:  05/15/2018: Alcohol abuse  No date: Anxiety  No date: Asthma  No date: Cardiac disease  No date: Cervical stenosis (uterine cervix)  No date: Chest pain  No date: Chronic kidney disease  No date: Colitis  No date: Colon polyps  No date: COPD (chronic obstructive pulmonary disease) ([facility])      Comment:  2L @ HS and PRN  No date: Coronary artery disease  No date: Diverticulitis  No date: Esophageal reflux  No date: 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HCC)  05/21/2018: Heart failure, systolic, due to idiopathic cardiomyopathy   (HCC)  No date: Hyperlipidemia  No date: Hypertension  No date: IBD (inflammatory bowel  disease)  No date: Myocardial infarction (HCC)  No date: Old myocardial infarction  08/24/2018: Paroxysmal atrial fibrillation (HCC)  No date: Perforation of colon (HCC)  12/07/2016: Pericarditis  No date: Type 2 diabetes, diet controlled (HCC)  No date: Ulcerative colitis (HCC)  12/08/2023: VT (ventricular tachycardia) (HCC) Past Surgical History:  No date: ANGIOPLASTY  No date: APPENDECTOMY  No date: COLON SURGERY  10/24/2016: COLONOSCOPY; N/A      Comment:  Procedure: COLONOSCOPY;  Surgeon: Tirso Juárez MD;               [facility];  Service:   No date: CORONARY ANGIOPLASTY WITH STENT PLACEMENT  10/24/2016: ESOPHAGOGASTRODUODENOSCOPY; N/A      Comment:  Procedure: ESOPHAGOGASTRODUODENOSCOPY (EGD);  Surgeon:                Tirso Juárez MD;  [facility];  Service:   5/22/2018: EXPLORATORY LAPAROTOMY W/ BOWEL RESECTION; N/A      Comment:  Procedure: EXPLORATORY LAPAROTOMY, ILIOCOLECTOMY,                ILIOCOLONIC ANASTAMOSIS, LOOP ILIOSTOMY, REPAIR OF                SEROSAL TEAR, EXTENSIVE LYSIS OF ADHESIONS, WOUND VAC                PLACEMENT;  Surgeon: Tirso Juárez MD;  Location: [facility];  Service: Colorectal  No date: HEMICOLECTOMY  10/5/2018: ILEOSTOMY CLOSURE; N/A      Comment:  Procedure: CLOSURE ILEOSTOMY;  Surgeon: Tirso Juárez MD;  Location[facility];  Service:                Colorectal  No date: OTHER SURGICAL HISTORY      Comment:  stent indications acute myocardial infarction  2/6/2018: MA COLONOSCOPY FLX DX W/COLLJ SPEC WHEN PFRMD; N/A      Comment:  Procedure: COLONOSCOPY;  Surgeon: Tirso Juárez MD;               [facility];  Service: Colorectal  10/4/2018: MA COLONOSCOPY FLX DX W/COLLJ SPEC WHEN PFRMD; N/A      Comment:  Procedure: COLONOSCOPY;  Surgeon: Tirso Juárez MD;               [facility];  Service: Colorectal  No date: TONSILLECTOMY   Current Outpatient Medications   Medication  Instructions    albuterol (PROVENTIL HFA,VENTOLIN HFA) 90 mcg/act inhaler 2 puffs, Inhalation, Every 4 hours PRN    albuterol 2.5 mg, Nebulization, Every 4 hours PRN    Aspirin Low Dose 81 mg, Oral, Daily, Chew    atorvastatin (LIPITOR) 40 mg, Oral, Daily with dinner    budesonide-formoterol (Symbicort) 160-4.5 mcg/act inhaler 2 puffs, Inhalation, Daily PRN, Rinse mouth after use.    fluticasone-umeclidinium-vilanterol (Trelegy Ellipta) 200-62.5-25 mcg/actuation AEPB inhaler 1 puff, Inhalation, Daily, Rinse mouth after use.    ipratropium-albuterol (DUO-NEB) 0.5-2.5 mg/3 mL nebulizer solution 3 mL, Nebulization, 4 times daily    metFORMIN (GLUCOPHAGE-XR) 750 mg, Oral, Daily with breakfast    metoprolol succinate (TOPROL-XL) 25 mg, Oral, Daily at bedtime, Daily at bedtime    Multiple Vitamins-Minerals (Centrum Silver 50+Men) TABS Oral    Nebulizers (AERONEB GO NEBULIZER HANDSET) MISC Does not apply, 2 times daily PRN    nicotine (NICODERM CQ) 21 mg/24 hr TD 24 hr patch 1 patch, Transdermal, Daily    pantoprazole (PROTONIX) 40 mg, Oral, Daily    sulfaSALAzine (AZULFIDINE) 500 mg, Oral, 4 times daily    Allergies   Allergen Reactions    Medical Tape     Other      Brown cloth band aids       Latex Rash      Social History     Tobacco Use    Smoking status: Every Day     Types: Cigars     Passive exposure: Past    Smokeless tobacco: Never    Tobacco comments:     4-5cigars per day before nowadays smokes about 1 cigar, trying to cut down.   Vaping Use    Vaping status: Never Used   Substance Use Topics    Alcohol use: Not Currently     Comment: last drink may 2023    Drug use: No    Family History   Problem Relation Age of Onset    Coronary artery disease Father     Crohn's disease Father     Stroke Father     Diabetes Family           Objective :                   Vitals I/O      Most Recent Min/Max in 24hrs   Temp 98.4 °F (36.9 °C) Temp  Min: 98.4 °F (36.9 °C)  Max: 98.4 °F (36.9 °C)   Pulse (!) 114 Pulse  Min: 100   Max: 116   Resp 21 Resp  Min: 18  Max: 21   /67 BP  Min: 106/67  Max: 144/83   O2 Sat 98 % SpO2  Min: 95 %  Max: 98 %    No intake or output data in the 24 hours ending 11/02/24 0048    Diet NPO    Invasive Monitoring           Physical Exam   Physical Exam  Eyes:      Pupils: Pupils are equal, round, and reactive to light.   Skin:     General: Skin is warm.   HENT:      Head: Normocephalic and atraumatic.   Neck:      Vascular: No JVD.   Cardiovascular:      Rate and Rhythm: Normal rate and regular rhythm.   Abdominal:      Palpations: Abdomen is soft.      Tenderness: There is abdominal tenderness. There is no guarding.   Constitutional:       General: He is not in acute distress.     Appearance: He is not toxic-appearing.   Pulmonary:      Effort: Pulmonary effort is normal.      Breath sounds: Rhonchi present.   Neurological:      General: No focal deficit present.      Mental Status: He is alert and oriented to person, place and time.      Motor: Strength full and intact in all extremities.          Diagnostic Studies        Lab Results: I have reviewed the following results:     Medications:  Scheduled PRN   cefTRIAXone, 1,000 mg, Q24H  cefTRIAXone, 1,000 mg, Q24H  chlorhexidine, 15 mL, Q12H JUVENTINO  enoxaparin, 40 mg, Daily  metoprolol tartrate, 25 mg, Once  nicotine, 7 mg, Once      acetaminophen, 650 mg, Q4H PRN  HYDROmorphone, 0.2 mg, Q2H PRN  ondansetron, 4 mg, Q6H PRN       Continuous    multi-electrolyte, 75 mL/hr         Labs:   CBC    Recent Labs     11/01/24  1703   WBC 14.63*   HGB 12.3   HCT 39.7        BMP    Recent Labs     11/01/24  1703   SODIUM 141   K 4.3      CO2 29   AGAP 8   BUN 19   CREATININE 1.25   CALCIUM 9.0       Coags    No recent results     Additional Electrolytes  No recent results       Blood Gas    No recent results  Recent Labs     11/01/24  2321   PHVEN 7.368   JJH2KFC 49.5   PO2VEN 49.1*   SWS9FUD 27.8   BEVEN 1.9   E6MDAPI 81.8*    LFTs  Recent Labs      11/01/24  1703   ALT 13   AST 12*   ALKPHOS 84   ALB 4.1   TBILI 0.53       Infectious  No recent results  Glucose  Recent Labs     11/01/24  1703   GLUC 125

## 2024-11-02 NOTE — H&P
H&P - Surgery-General   Name: Mathew Bird 64 y.o. male I MRN: [id number]  Unit/Bed#: ED 08 I Date of Admission: 11/1/2024   Date of Service: 11/1/2024 I Hospital Day: 0     Assessment & Plan  Bowel obstruction (HCC)  64-year-old man with abdominal pain. Hx of Crohn's s/p ileal colectomy with anastomosis and  ileostomy which as been reversed, CHF, CAD s/p stents, COPD, CKD, T2DM, Afib.  Intermittent abdominal pain starting this morning.  No nausea or vomiting, last bowel movement 1 hour prior to arrival.  Tender in right lower quadrant with no rebound or guarding, distended.  Tachycardic to 116, rest of vital signs stable on 4L NC.  WBC 14.63. CT AP shows concerns for high-grade small bowel obstruction with  associated stranding   of the mesentery and small amount of fluid. No small bowel pneumatosis.  Concerns for closed-loop obstruction.    Plan  -OR for Ex lap, possible bowel resection   -NPO  -NGT insertion  -IV fluids  -Pain/nausea control    History of Present Illness   Mathew Bird is a 64 y.o. male who presents with abdominal pain. Intermittent abdominal pain starting this morning.  Is the first time patient experienced this.  No nausea or vomiting, last bowel movement 1 hour prior to arrival.  No fevers or chills.  Patient reports some dizziness and lightheadedness today.  Patient is tolerating liquids last meal was a few cupcakes this morning.  Hx of Crohn's s/p ileal colectomy with anastomosis and  ileostomy which as been reversed, CHF, CAD s/p stents, COPD, CKD, T2DM, Afib.  Aspirin, no anticoagulation.  Allergies to latex and medical tape.    Review of Systems   Constitutional:  Negative for chills and fever.   Gastrointestinal:  Positive for abdominal pain. Negative for nausea and vomiting.   Neurological:  Positive for dizziness and light-headedness.     I have reviewed the patient's PMH, PSH, Social History, Family History, Meds, and Allergies  Historical Information   Past Medical History:    Diagnosis Date    Alcohol abuse 05/15/2018    Anxiety     Asthma     Cardiac disease     Cervical stenosis (uterine cervix)     Chest pain     Chronic kidney disease     Colitis     Colon polyps     COPD (chronic obstructive pulmonary disease) (HCC)     2L @ HS and PRN    Coronary artery disease     Diverticulitis     Esophageal reflux     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HCC)     Heart failure, systolic, due to idiopathic cardiomyopathy (HCC) 05/21/2018    Hyperlipidemia     Hypertension     IBD (inflammatory bowel disease)     Myocardial infarction (HCC)     Old myocardial infarction     Paroxysmal atrial fibrillation (HCC) 08/24/2018    Perforation of colon (HCC)     Pericarditis 12/07/2016    Type 2 diabetes, diet controlled (HCC)     Ulcerative colitis (HCC)     VT (ventricular tachycardia) ([facility]) 12/08/2023     Past Surgical History:   Procedure Laterality Date    ANGIOPLASTY      APPENDECTOMY      COLON SURGERY      COLONOSCOPY N/A 10/24/2016    Procedure: COLONOSCOPY;  Surgeon: Tirso Juárez MD;  [facility];  Service:     CORONARY ANGIOPLASTY WITH STENT PLACEMENT      ESOPHAGOGASTRODUODENOSCOPY N/A 10/24/2016    Procedure: ESOPHAGOGASTRODUODENOSCOPY (EGD);  Surgeon: Tirso Juárez MD;  [facility];  Service:     EXPLORATORY LAPAROTOMY W/ BOWEL RESECTION N/A 5/22/2018    Procedure: EXPLORATORY LAPAROTOMY, ILIOCOLECTOMY, ILIOCOLONIC ANASTAMOSIS, LOOP ILIOSTOMY, REPAIR OF SEROSAL TEAR, EXTENSIVE LYSIS OF ADHESIONS, WOUND VAC PLACEMENT;  Surgeon: Tirso Juárez MD;  [facility];  Service: Colorectal    HEMICOLECTOMY      ILEOSTOMY CLOSURE N/A 10/5/2018    Procedure: CLOSURE ILEOSTOMY;  Surgeon: Tirso Juárez MD;  [facility];  Service: Colorectal    OTHER SURGICAL HISTORY      stent indications acute myocardial infarction    IN COLONOSCOPY FLX DX W/COLLJ SPEC WHEN PFRMD N/A 2/6/2018    Procedure: COLONOSCOPY;  Surgeon: Tirso Juárez MD;  [facility]: [facility];   Service: Colorectal    [place] COLONOSCOPY FLX DX W/COLLJ SPEC WHEN PFRMD N/A 10/4/2018    Procedure: COLONOSCOPY;  Surgeon: Tirso Juárez MD;  [facility];  Service: Colorectal    TONSILLECTOMY       Social History     Tobacco Use    Smoking status: Every Day     Types: Cigars     Passive exposure: Past    Smokeless tobacco: Never    Tobacco comments:     4-5cigars per day before nowadays smokes about 1 cigar, trying to cut down.   Vaping Use    Vaping status: Never Used   Substance and Sexual Activity    Alcohol use: Not Currently     Comment: last drink may 2023    Drug use: No    Sexual activity: Yes     Partners: Female     Birth control/protection: None     E-Cigarette/Vaping    E-Cigarette Use Never User      E-Cigarette/Vaping Substances    Nicotine No     THC No     CBD No     Flavoring No     Other No     Unknown No      Family history non-contributory  Social History     Tobacco Use    Smoking status: Every Day     Types: Cigars     Passive exposure: Past    Smokeless tobacco: Never    Tobacco comments:     4-5cigars per day before nowadays smokes about 1 cigar, trying to cut down.   Vaping Use    Vaping status: Never Used   Substance and Sexual Activity    Alcohol use: Not Currently     Comment: last drink may 2023    Drug use: No    Sexual activity: Yes     Partners: Female     Birth control/protection: None       Current Facility-Administered Medications:     lactated ringers infusion, Continuous    metoprolol tartrate (LOPRESSOR) tablet 25 mg, Once    multi-electrolyte (ISOLYTE-S PH 7.4) bolus 1,000 mL, Once    nicotine (NICODERM CQ) 7 mg/24hr TD 24 hr patch 7 mg, Once  Prior to Admission Medications   Prescriptions Last Dose Informant Patient Reported? Taking?   Multiple Vitamins-Minerals (Centrum Silver 50+Men) TABS  Self Yes No   Sig: Take by mouth   Nebulizers (AERONEB GO NEBULIZER HANDSET) MISC  Self No No   Sig: by Does not apply route 2 (two) times a day as needed (wheezing)   albuterol  (2.5 mg/3 mL) 0.083 % nebulizer solution  Self No No   Sig: Take 3 mL (2.5 mg total) by nebulization every 4 (four) hours as needed for wheezing or shortness of breath   albuterol (PROVENTIL HFA,VENTOLIN HFA) 90 mcg/act inhaler  Self No No   Sig: Inhale 2 puffs every 4 (four) hours as needed for wheezing or shortness of breath   aspirin (Aspirin Low Dose) 81 mg chewable tablet   No No   Sig: CHEW 1 TABLET BY MOUTH EVERY DAY   atorvastatin (LIPITOR) 40 mg tablet   No No   Sig: TAKE 1 TABLET BY MOUTH EVERY DAY WITH DINNER   budesonide-formoterol (Symbicort) 160-4.5 mcg/act inhaler   No No   Sig: Inhale 2 puffs daily as needed (for shortness of breath) Rinse mouth after use.   fluticasone-umeclidinium-vilanterol (Trelegy Ellipta) 100-62.5-25 mcg/actuation inhaler   No No   Sig: Inhale 1 puff daily Rinse mouth after use.   fluticasone-umeclidinium-vilanterol (Trelegy Ellipta) 200-62.5-25 mcg/actuation AEPB inhaler   No No   Sig: Inhale 1 puff daily Rinse mouth after use.   ipratropium-albuterol (DUO-NEB) 0.5-2.5 mg/3 mL nebulizer solution   No No   Sig: Take 3 mL by nebulization 4 (four) times a day   metFORMIN (GLUCOPHAGE-XR) 750 mg 24 hr tablet  Self No No   Sig: Take 1 tablet (750 mg total) by mouth daily with breakfast   metoprolol succinate (TOPROL-XL) 25 mg 24 hr tablet  Self No No   Sig: TAKE 1 TABLET (25 MG TOTAL) BY MOUTH DAILY AT BEDTIME DAILY AT BEDTIME   nicotine (NICODERM CQ) 21 mg/24 hr TD 24 hr patch  Self No No   Sig: Place 1 patch on the skin over 24 hours daily   pantoprazole (PROTONIX) 40 mg tablet  Self No No   Sig: TAKE 1 TABLET BY MOUTH EVERY DAY   sulfaSALAzine (AZULFIDINE) 500 mg tablet  Self No No   Sig: Take 1 tablet (500 mg total) by mouth 4 (four) times a day      Facility-Administered Medications: None     Medical tape, Other, and Latex    Objective :  Temp:  [98.4 °F (36.9 °C)] 98.4 °F (36.9 °C)  HR:  [100-116] 114  BP: (106-144)/(67-85) 106/67  Resp:  [18-21] 21  SpO2:  [95 %-98 %] 98  %  O2 Device: Nasal cannula  Nasal Cannula O2 Flow Rate (L/min):  [4 L/min] 4 L/min      Physical Exam  Vitals and nursing note reviewed.   Constitutional:       General: He is not in acute distress.  HENT:      Head: Normocephalic and atraumatic.   Cardiovascular:      Rate and Rhythm: Tachycardia present.   Pulmonary:      Effort: Pulmonary effort is normal. No respiratory distress.   Abdominal:      Comments: Soft, tender in the right lower quadrant, no rebound or guarding, did distended, ventral hernia   Neurological:      Mental Status: He is alert.         Lab Results: I have reviewed the following results:  Recent Labs     11/01/24  1703   WBC 14.63*   HGB 12.3   HCT 39.7      SODIUM 141   K 4.3      CO2 29   BUN 19   CREATININE 1.25   GLUC 125   AST 12*   ALT 13   ALB 4.1   TBILI 0.53   ALKPHOS 84       Imaging Results Review: I personally reviewed the following image studies/reports in PACS and discussed pertinent findings with Radiology: CT abdomen/pelvis. My interpretation of the radiology images/reports is: Concern for high-grade closed-loop obstruction.  Other Study Results Review: No additional pertinent studies reviewed.    VTE Pharmacologic Prophylaxis: VTE covered by:    None     VTE Mechanical Prophylaxis: sequential compression device

## 2024-11-02 NOTE — ASSESSMENT & PLAN NOTE
64-year-old man with Hx of Crohn's s/p ileal colectomy with anastomosis and  ileostomy which as been reversed, here with concerns for closed-loop obstruction and UTI.     Tachycardic to 110s, rest of vital signs stable on 4L NC  Received 1 L bolus of fluid    Urine 250 cc   cc gastric    WBC 12.51 from 14.63  Hb 11.3 from 12.3  Cr 1.31 from 1.25  Lactate 0.8 from 0.8 from 0.7  Base excess 2.0 from 2.9 from 1.9    Plan  -If exams and labs worsen OR for Ex lap, possible bowel resection   -Continue NPO/NGT  -CT AP with PO contrast  -Continue IV fluids  -Home metoprolol Held, patient did not take dose prior to coming to hospital  -Continue abx for UTI  -F/u Blood cx & urine cx  -Pain/nausea control  -Replete electrolytes PRN

## 2024-11-03 ENCOUNTER — APPOINTMENT (INPATIENT)
Dept: RADIOLOGY | Facility: HOSPITAL | Age: 64
DRG: 389 | End: 2024-11-03
Payer: COMMERCIAL

## 2024-11-03 LAB
ANION GAP SERPL CALCULATED.3IONS-SCNC: 7 MMOL/L (ref 4–13)
BUN SERPL-MCNC: 21 MG/DL (ref 5–25)
CA-I BLD-SCNC: 1.06 MMOL/L (ref 1.12–1.32)
CALCIUM SERPL-MCNC: 7.8 MG/DL (ref 8.4–10.2)
CHLORIDE SERPL-SCNC: 103 MMOL/L (ref 96–108)
CO2 SERPL-SCNC: 33 MMOL/L (ref 21–32)
CREAT SERPL-MCNC: 1.28 MG/DL (ref 0.6–1.3)
ERYTHROCYTE [DISTWIDTH] IN BLOOD BY AUTOMATED COUNT: 17.9 % (ref 11.6–15.1)
GFR SERPL CREATININE-BSD FRML MDRD: 59 ML/MIN/1.73SQ M
GLUCOSE SERPL-MCNC: 125 MG/DL (ref 65–140)
GLUCOSE SERPL-MCNC: 180 MG/DL (ref 65–140)
GLUCOSE SERPL-MCNC: 86 MG/DL (ref 65–140)
GLUCOSE SERPL-MCNC: 86 MG/DL (ref 65–140)
HCT VFR BLD AUTO: 37.2 % (ref 36.5–49.3)
HGB BLD-MCNC: 11.3 G/DL (ref 12–17)
MAGNESIUM SERPL-MCNC: 2.5 MG/DL (ref 1.9–2.7)
MCH RBC QN AUTO: 26 PG (ref 26.8–34.3)
MCHC RBC AUTO-ENTMCNC: 30.4 G/DL (ref 31.4–37.4)
MCV RBC AUTO: 86 FL (ref 82–98)
PHOSPHATE SERPL-MCNC: 3.7 MG/DL (ref 2.3–4.1)
PLATELET # BLD AUTO: 178 THOUSANDS/UL (ref 149–390)
PMV BLD AUTO: 11 FL (ref 8.9–12.7)
POTASSIUM SERPL-SCNC: 4.2 MMOL/L (ref 3.5–5.3)
RBC # BLD AUTO: 4.35 MILLION/UL (ref 3.88–5.62)
SODIUM SERPL-SCNC: 143 MMOL/L (ref 135–147)
WBC # BLD AUTO: 8.47 THOUSAND/UL (ref 4.31–10.16)

## 2024-11-03 PROCEDURE — 94760 N-INVAS EAR/PLS OXIMETRY 1: CPT

## 2024-11-03 PROCEDURE — 82330 ASSAY OF CALCIUM: CPT

## 2024-11-03 PROCEDURE — 74018 RADEX ABDOMEN 1 VIEW: CPT

## 2024-11-03 PROCEDURE — 94664 DEMO&/EVAL PT USE INHALER: CPT

## 2024-11-03 PROCEDURE — 80048 BASIC METABOLIC PNL TOTAL CA: CPT | Performed by: PHYSICIAN ASSISTANT

## 2024-11-03 PROCEDURE — 85027 COMPLETE CBC AUTOMATED: CPT

## 2024-11-03 PROCEDURE — 94640 AIRWAY INHALATION TREATMENT: CPT

## 2024-11-03 PROCEDURE — 84100 ASSAY OF PHOSPHORUS: CPT

## 2024-11-03 PROCEDURE — 83735 ASSAY OF MAGNESIUM: CPT

## 2024-11-03 PROCEDURE — 97163 PT EVAL HIGH COMPLEX 45 MIN: CPT

## 2024-11-03 PROCEDURE — 82948 REAGENT STRIP/BLOOD GLUCOSE: CPT

## 2024-11-03 RX ORDER — CALCIUM GLUCONATE 20 MG/ML
1 INJECTION, SOLUTION INTRAVENOUS ONCE
Status: COMPLETED | OUTPATIENT
Start: 2024-11-03 | End: 2024-11-03

## 2024-11-03 RX ADMIN — PANTOPRAZOLE SODIUM 40 MG: 40 INJECTION, POWDER, FOR SOLUTION INTRAVENOUS at 08:13

## 2024-11-03 RX ADMIN — LEVALBUTEROL HYDROCHLORIDE 1.25 MG: 1.25 SOLUTION RESPIRATORY (INHALATION) at 19:14

## 2024-11-03 RX ADMIN — UMECLIDINIUM 1 PUFF: 62.5 AEROSOL, POWDER ORAL at 08:07

## 2024-11-03 RX ADMIN — SODIUM CHLORIDE, SODIUM GLUCONATE, SODIUM ACETATE, POTASSIUM CHLORIDE, MAGNESIUM CHLORIDE, SODIUM PHOSPHATE, DIBASIC, AND POTASSIUM PHOSPHATE 75 ML/HR: .53; .5; .37; .037; .03; .012; .00082 INJECTION, SOLUTION INTRAVENOUS at 22:22

## 2024-11-03 RX ADMIN — SODIUM CHLORIDE, SODIUM GLUCONATE, SODIUM ACETATE, POTASSIUM CHLORIDE, MAGNESIUM CHLORIDE, SODIUM PHOSPHATE, DIBASIC, AND POTASSIUM PHOSPHATE 75 ML/HR: .53; .5; .37; .037; .03; .012; .00082 INJECTION, SOLUTION INTRAVENOUS at 07:59

## 2024-11-03 RX ADMIN — NICOTINE 1 PATCH: 21 PATCH, EXTENDED RELEASE TRANSDERMAL at 08:13

## 2024-11-03 RX ADMIN — LEVALBUTEROL HYDROCHLORIDE 1.25 MG: 1.25 SOLUTION RESPIRATORY (INHALATION) at 14:38

## 2024-11-03 RX ADMIN — ACETAMINOPHEN 1000 MG: 10 INJECTION INTRAVENOUS at 05:15

## 2024-11-03 RX ADMIN — ENOXAPARIN SODIUM 40 MG: 40 INJECTION SUBCUTANEOUS at 08:13

## 2024-11-03 RX ADMIN — IPRATROPIUM BROMIDE 0.5 MG: 0.5 SOLUTION RESPIRATORY (INHALATION) at 14:38

## 2024-11-03 RX ADMIN — CALCIUM GLUCONATE 1 G: 20 INJECTION, SOLUTION INTRAVENOUS at 08:13

## 2024-11-03 RX ADMIN — IPRATROPIUM BROMIDE 0.5 MG: 0.5 SOLUTION RESPIRATORY (INHALATION) at 19:14

## 2024-11-03 RX ADMIN — ACETAMINOPHEN 1000 MG: 10 INJECTION INTRAVENOUS at 12:50

## 2024-11-03 RX ADMIN — CEFTRIAXONE SODIUM 1000 MG: 10 INJECTION, POWDER, FOR SOLUTION INTRAVENOUS at 23:04

## 2024-11-03 RX ADMIN — IPRATROPIUM BROMIDE 0.5 MG: 0.5 SOLUTION RESPIRATORY (INHALATION) at 07:18

## 2024-11-03 RX ADMIN — CHLORHEXIDINE GLUCONATE 0.12% ORAL RINSE 15 ML: 1.2 LIQUID ORAL at 08:13

## 2024-11-03 RX ADMIN — ACETAMINOPHEN 1000 MG: 10 INJECTION INTRAVENOUS at 16:58

## 2024-11-03 RX ADMIN — LEVALBUTEROL HYDROCHLORIDE 1.25 MG: 1.25 SOLUTION RESPIRATORY (INHALATION) at 07:18

## 2024-11-03 RX ADMIN — FLUTICASONE FUROATE AND VILANTEROL TRIFENATATE 1 PUFF: 200; 25 POWDER RESPIRATORY (INHALATION) at 08:07

## 2024-11-03 RX ADMIN — INSULIN LISPRO 1 UNITS: 100 INJECTION, SOLUTION INTRAVENOUS; SUBCUTANEOUS at 12:44

## 2024-11-03 RX ADMIN — CEFTRIAXONE SODIUM 1000 MG: 10 INJECTION, POWDER, FOR SOLUTION INTRAVENOUS at 00:02

## 2024-11-03 NOTE — PLAN OF CARE
Problem: PHYSICAL THERAPY ADULT  Goal: Performs mobility at highest level of function for planned discharge setting.  See evaluation for individualized goals.  Description:    Equipment Recommended: Walker (pt owns)       See flowsheet documentation for full assessment, interventions and recommendations.  Outcome: Completed  Note: Prognosis: Good  Problem List: Decreased strength, Decreased endurance, Impaired balance, Decreased mobility, Pain  Assessment: Pt seen for PT evaluation. Pt with active PT eval/treat orders. Pt is a 64 y.o. male who was [facility] on [facility]/1/24. Pt's current dx/ problem list include: bowel obstruction. Comorbidities affecting pt's physical performance at time of assessment are as follows: has a past medical history of Alcohol abuse, Anxiety, Asthma, Cardiac disease, Cervical stenosis (uterine cervix), Chest pain, Chronic kidney disease, Colitis, Colon polyps, COPD (chronic obstructive pulmonary disease) ([facility]), Coronary artery disease, Diverticulitis, Esophageal reflux, 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HCC), Heart failure, systolic, due to idiopathic cardiomyopathy ([facility]), Hyperlipidemia, Hypertension, IBD (inflammatory bowel disease), Myocardial infarction ([facility]), Old myocardial infarction, Paroxysmal atrial fibrillation ([facility]), Perforation of colon ([facility]), Pericarditis, Type 2 diabetes, diet controlled ([facility]), Ulcerative colitis (HCC), and VT (ventricular tachycardia) ([facility]). Personal factors affecting pt at time of initial examination include: inability to perform IADLs, inability to perform ADLs, inability to ambulate household distances. Due to acute medical issues, ongoing medical workup for primary dx; pain, fall risk, decreased activity tolerance compared to baseline, increased assistance needed from caregiver at current time, multiple lines, decline in overall functional mobility status; health management issues. At baseline, pt resides in a [facility] and was independent with  ADLs/ IADLs. Currently, upon initial examination, pt is requiring mod I for supine to sit and sit to stand and supervision for ambulation. No further acute PT needs at this time. Patient would benefit from continued mobilization with restorative and nursing staff. Patient at or near his functional baseline. Recommend no post acute rehabilitation needs. Please also refer to the recommendation of the Physical Therapist for safe discharge planning.  Barriers to Discharge: None     Rehab Resource Intensity Level, PT: No post-acute rehabilitation needs    See flowsheet documentation for full assessment.

## 2024-11-03 NOTE — ASSESSMENT & PLAN NOTE
64-year-old man with Hx of Crohn's s/p ileal colectomy with anastomosis and  ileostomy which as been reversed, here with concerns for closed-loop obstruction and UTI.     11/3 KUB shows contrast in rectum. NGT removed and started on clear liquid diet.      Plan  -Advance to soft diet  -Discontinue IV fluids when tolerating food  -Restart home meds as appropriate  -Continue abx for UTI, Rocephin  -F/u Blood cx & urine cx, pending  -Pain/nausea control  -Replete electrolytes PRN  -OOB/ambulation

## 2024-11-03 NOTE — PROGRESS NOTES
Progress Note - Surgery-General   Name: Mathew Bird 64 y.o. male I MRN: [id number]  Unit/Bed#: [facility] 932-01 I Date of Admission: 11/1/2024   Date of Service: 11/4/2024 I Hospital Day: 3    Assessment & Plan  Bowel obstruction (HCC)  64-year-old man with Hx of Crohn's s/p ileal colectomy with anastomosis and  ileostomy which as been reversed, here with concerns for closed-loop obstruction and UTI.     11/3 KUB shows contrast in rectum. NGT removed and started on clear liquid diet.      Plan  -Advance to soft diet  -Discontinue IV fluids when tolerating food  -Restart home meds as appropriate  -Continue abx for UTI, Rocephin  -F/u Blood cx & urine cx, pending  -Pain/nausea control  -Replete electrolytes PRN  -OOB/ambulation    Please contact the SecureChat role for the Surgery-General service with any questions/concerns.    Subjective   Patient seen at bedside, not in acute distress.  Patient denies abdominal pain.  Patient tolerated clear liquids.  Patient is having a lot of liquid bowel movements.  Patient is ambulating.  Patient is voiding.  No nausea vomiting fevers chills or chest pain.  Patient has shortness of breath at baseline.  Objective :  Temp:  [97.5 °F (36.4 °C)-98.8 °F (37.1 °C)] 97.5 °F (36.4 °C)  HR:  [73-99] 73  BP: ()/(51-73) 86/51  Resp:  [16-18] 17  SpO2:  [84 %-98 %] 96 %  O2 Device: Nasal cannula  Nasal Cannula O2 Flow Rate (L/min):  [4 L/min] 4 L/min    I/O         11/01 0701  11/02 0700 11/02 0701  11/03 0700 11/03 0701  11/04 0700    P.O.   0    I.V. (mL/kg)  1212 (15)     IV Piggyback   50    Total Intake(mL/kg)  1212 (15) 50 (0.6)    Urine (mL/kg/hr) 450 200 (0.1) 0 (0)    Emesis/NG output 400 1250 100    Stool  0     Total Output 850 1450 100    Net -850 -238 -50           Unmeasured Urine Occurrence  401 x 1 x    Unmeasured Stool Occurrence  2 x             Physical Exam  Vitals and nursing note reviewed.   Constitutional:       General: He is not in acute distress.  HENT:       Head: Normocephalic and atraumatic.   Cardiovascular:      Rate and Rhythm: Normal rate.   Pulmonary:      Effort: Pulmonary effort is normal. No respiratory distress.   Abdominal:      Comments: Soft, nontender, nondistended, midline incisional hernia with no skin changes     Neurological:      Mental Status: He is alert.           Lab Results: I have reviewed the following results:  Recent Labs     11/02/24  0510 11/02/24  1439 11/02/24  1955 11/03/24  0627 11/04/24  0437   WBC 12.51*   < >  --  8.47 7.11   HGB 11.3*   < >  --  11.3* 10.4*   HCT 36.7   < >  --  37.2 34.3*      < >  --  178 175   SODIUM 140   < >  --  143 143   K 3.9   < >  --  4.2 3.7      < >  --  103 102   CO2 31   < >  --  33* 30   BUN 18   < >  --  21 13   CREATININE 1.32*   < >  --  1.28 1.03   GLUC 116   < >  --  86 71   CAIONIZED  --   --   --  1.06*  --    MG 1.5*   < >  --  2.5  --    PHOS 3.2   < >  --  3.7  --    AST 13  --   --   --   --    ALT 12  --   --   --   --    ALB 3.7  --   --   --   --    TBILI 0.47  --   --   --   --    ALKPHOS 81  --   --   --   --    LACTICACID 0.8   < > 2.0  --   --     < > = values in this interval not displayed.       Imaging Results Review: No pertinent imaging studies reviewed.  Other Study Results Review: No additional pertinent studies reviewed.    VTE Pharmacologic Prophylaxis: VTE covered by:  enoxaparin, Subcutaneous, 40 mg at 11/03/24 0813      VTE Mechanical Prophylaxis: sequential compression device

## 2024-11-03 NOTE — RESPIRATORY THERAPY NOTE
Resp care   11/03/24 0718   Inhalation Therapy Tx   $ Inhalation Therapy Performed Yes   $ Pulse Oximetry Spot Check Charge Completed   SpO2 96 %   Pre-Treatment Pulse 88   Pre-Treatment Respirations 20   Duration 15   Breath Sounds Pre-Treatment Bilateral Expiratory wheeze;Inspiratory wheeze   Breath Sounds Post-Treatment Bilateral Diminished   Post-Treatment Pulse 88   Post-Treatment Respirations 20   Delivery Source Oxygen;UDN   Position High Alford's   Treatment Tolerance Tolerated well   Resp Comments pt found on 4L nc, spo2 is 98%, bs are insp exp wheeze, udn tx given, will cont to monitor per resp protocol.

## 2024-11-03 NOTE — PROGRESS NOTES
Progress Note - Surgery-General   Name: Mathew Bird 64 y.o. male I MRN: [id number]  Unit/Bed#: [facility] 932-01 I Date of Admission: 11/1/2024   Date of Service: 11/3/2024 I Hospital Day: 2    Assessment & Plan  Bowel obstruction (HCC)  64-year-old man with Hx of Crohn's s/p ileal colectomy with anastomosis and  ileostomy which as been reversed, here with concerns for closed-loop obstruction and UTI.     Tachycardia resolved, rest of vital signs stable on 4L NC  Received 1 L bolus at 2031    Urine 200 cc + 5 unmeasured occurrence  NGT 1250 cc cc gastric  Stool 2X    WBC pending from 18.16  Hb pending from 12.4  Cr pending from 1.53    Most recent:   Lactate 2.0  Base excess 1.9     Plan  -If exams and labs worsen OR for Ex lap, possible bowel resection   -DC NGT pending KUB  -Continue IV fluids  -Continue to hold metoprolol and statin  -Continue abx for UTI, Rocephin  -F/u Blood cx & urine cx, pending  -Pain/nausea control  -Replete electrolytes PRN  -OOB/ambulation    Surgery-General service will follow.    Subjective   Patient was seen at bedside, not in acute distress.  No abdominal pain.  Patient has been out of bed.  Patient is voiding without difficulty.  No nausea vomiting fever chills chest pain or shortness of breath.  Patient passed 2 bowel movements.  Patient is passing flatus.    Objective :  Temp:  [97.9 °F (36.6 °C)-99.3 °F (37.4 °C)] 97.9 °F (36.6 °C)  HR:  [] 83  BP: ()/(54-78) 100/65  Resp:  [14-22] 19  SpO2:  [87 %-98 %] 93 %  O2 Device: Nasal cannula  Nasal Cannula O2 Flow Rate (L/min):  [4 L/min] 4 L/min    I/O         11/01 0701 11/02 0700 11/02 0701 11/03 0700    I.V. (mL/kg)  1212 (15)    Total Intake(mL/kg)  1212 (15)    Urine (mL/kg/hr) 450 200 (0.1)    Emesis/NG output 400 1250    Stool  0    Total Output 850 1450    Net -850 -238          Unmeasured Urine Occurrence  401 x    Unmeasured Stool Occurrence  2 x          Lines/Drains/Airways       Active Status       Name  Placement date Placement time Site Days    NG/OG/Enteral Tube Nasogastric 18 Fr Left nare --  --  Left nare  --                  Physical Exam  Vitals and nursing note reviewed.   Constitutional:       General: He is not in acute distress.  HENT:      Head: Normocephalic and atraumatic.      Comments: NGT in place  Cardiovascular:      Rate and Rhythm: Normal rate.   Pulmonary:      Effort: Pulmonary effort is normal. No respiratory distress.   Abdominal:      Comments: Soft, tender along the belt line but less than yesterday, distended, nontender over ventral hernia, no secondary  midline incisional hernia with no skin changes    Neurological:      Mental Status: He is alert.           Lab Results: I have reviewed the following results:  Recent Labs     11/02/24  0510 11/02/24  1439 11/02/24  1650 11/02/24  1955   WBC 12.51*  --  18.16*  --    HGB 11.3*  --  12.4  --    HCT 36.7  --  40.7  --      --  220  --    SODIUM 140   < > 142  --    K 3.9   < > 4.7  --       < > 100  --    CO2 31   < > 31  --    BUN 18   < > 22  --    CREATININE 1.32*   < > 1.53*  --    GLUC 116   < > 122  --    MG 1.5*  --  2.7  --    PHOS 3.2  --  4.3*  --    AST 13  --   --   --    ALT 12  --   --   --    ALB 3.7  --   --   --    TBILI 0.47  --   --   --    ALKPHOS 81  --   --   --    LACTICACID 0.8  --  2.9* 2.0    < > = values in this interval not displayed.       Imaging Results Review: No pertinent imaging studies reviewed.  Other Study Results Review: No additional pertinent studies reviewed.    VTE Pharmacologic Prophylaxis: VTE covered by:  enoxaparin, Subcutaneous, 40 mg at 11/02/24 1030     VTE Mechanical Prophylaxis: sequential compression device

## 2024-11-03 NOTE — ASSESSMENT & PLAN NOTE
64-year-old man with Hx of Crohn's s/p ileal colectomy with anastomosis and  ileostomy which as been reversed, here with concerns for closed-loop obstruction and UTI.     Tachycardia resolved, rest of vital signs stable on 4L NC  Received 1 L bolus at 2031    Urine 200 cc + 5 unmeasured occurrence  NGT 1250 cc cc gastric  Stool 2X    WBC pending from 18.16  Hb pending from 12.4  Cr pending from 1.53    Most recent:   Lactate 2.0  Base excess 1.9     Plan  -If exams and labs worsen OR for Ex lap, possible bowel resection   -DC NGT pending KUB  -Continue IV fluids  -Continue to hold metoprolol and statin  -Continue abx for UTI, Rocephin  -F/u Blood cx & urine cx, pending  -Pain/nausea control  -Replete electrolytes PRN  -OOB/ambulation

## 2024-11-03 NOTE — PHYSICAL THERAPY NOTE
Physical Therapy Evaluation     Patient's Name: Mathew Bird    Admitting Diagnosis  Bowel obstruction (HCC) [K56.609]  Abdominal pain [R10.9]  Partial small bowel obstruction (HCC) [K56.600]  Intestinal obstruction, unspecified cause, unspecified whether partial or complete (HCC) [K56.609]    Problem List  Patient Active Problem List   Diagnosis    Crohn disease (HCC)    Spinal stenosis of cervical region    Coronary artery disease involving native coronary artery of native heart without angina pectoris    Atherosclerosis of coronary artery    Stage 4 very severe COPD by GOLD classification (HCC)    Dyslipidemia    Elevated troponin    Cigarette nicotine dependence without complication    Moderate protein-calorie malnutrition (HCC)    Macrocytic anemia    DEBBY (acute kidney injury) (HCC)    Presence of drug-eluting stent in right coronary artery    Paroxysmal atrial fibrillation (HCC)    Status post reversal of ileostomy    Chronic respiratory failure with hypoxia (HCC)    End stage COPD (HCC)    Abnormal liver function    Multiple pulmonary nodules    Bladder wall thickening    Acute exacerbation of chronic obstructive pulmonary disease (COPD) (HCC)    Bronchitis    Type 2 diabetes mellitus, without long-term current use of insulin (HCC)    Abnormal CT of the chest    Bowel obstruction (HCC)       Past Medical History  Past Medical History:   Diagnosis Date    Alcohol abuse 05/15/2018    Anxiety     Asthma     Cardiac disease     Cervical stenosis (uterine cervix)     Chest pain     Chronic kidney disease     Colitis     Colon polyps     COPD (chronic obstructive pulmonary disease) (HCC)     2L @ HS and PRN    Coronary artery disease     Diverticulitis     Esophageal reflux     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HCC)     Heart failure, systolic, due to idiopathic cardiomyopathy (HCC) 05/21/2018    Hyperlipidemia     Hypertension     IBD (inflammatory bowel disease)     Myocardial infarction (HCC)     Old myocardial  infarction     Paroxysmal atrial fibrillation (HCC) 08/24/2018    Perforation of colon (HCC)     Pericarditis 12/07/2016    Type 2 diabetes, diet controlled (HCC)     Ulcerative colitis (HCC)     VT [facility]ricular tachycardia) [facility]) 12/08/2023       Past Surgical History  Past Surgical History:   Procedure Laterality Date    ANGIOPLASTY      APPENDECTOMY      COLON SURGERY      COLONOSCOPY N/A 10/24/2016    Procedure: COLONOSCOPY;  Surgeon: Tirso Juárez MD;  Location: [facility];  Service:     CORONARY ANGIOPLASTY WITH STENT PLACEMENT      ESOPHAGOGASTRODUODENOSCOPY N/A 10/24/2016    Procedure: ESOPHAGOGASTRODUODENOSCOPY (EGD);  Surgeon: Tirso Juárez MD;  Location: [facility];  Service:     EXPLORATORY LAPAROTOMY W/ BOWEL RESECTION N/A 5/22/2018    Procedure: EXPLORATORY LAPAROTOMY, ILIOCOLECTOMY, ILIOCOLONIC ANASTAMOSIS, LOOP ILIOSTOMY, REPAIR OF SEROSAL TEAR, EXTENSIVE LYSIS OF ADHESIONS, WOUND VAC PLACEMENT;  Surgeon: Tirso Juárez MD;  Location: [facility];  Service: Colorectal    HEMICOLECTOMY      ILEOSTOMY CLOSURE N/A 10/5/2018    Procedure: CLOSURE ILEOSTOMY;  Surgeon: Tirso Juárez MD;  [facility]: [facility];  Service: Colorectal    OTHER SURGICAL HISTORY      stent indications acute myocardial infarction    FL COLONOSCOPY FLX DX W/COLLJ SPEC WHEN PFRMD N/A 2/6/2018    Procedure: COLONOSCOPY;  Surgeon: Tirso Juárez MD;  [facility]: [facility];  Service: Colorectal    FL COLONOSCOPY FLX DX W/COLLJ SPEC WHEN PFRMD N/A 10/4/2018    Procedure: COLONOSCOPY;  Surgeon: Tirso Juárez MD;  [facility];  Service: Colorectal    TONSILLECTOMY            11/03/24 0911   PT Last Visit   PT Visit Date 11/03/24   Note Type   Note type Evaluation   Pain Assessment   Pain Assessment Tool 0-10   Pain Score 2   Pain Location/Orientation Location: Abdomen   Hospital Pain Intervention(s) Repositioned;Ambulation/increased activity   Restrictions/Precautions   Weight Bearing Precautions Per Order  No   Other Precautions Multiple lines;O2;Fall Risk;Pain  (4L O2, NG tube, IV)   Home Living   Type of Home House   Home Layout One level;Performs ADLs on one level;Able to live on main level with bedroom/bathroom  (0 KAREN, FFSU, 4LO2 baseline)   Bathroom Shower/Tub Tub/shower unit   Bathroom Toilet Standard   Bathroom Equipment Shower chair   Home Equipment Walker;Cane;Other (Comment)  (rollator)   Additional Comments PTA pt reports primarly utilizing rollator   Prior Function   Level of [place] Independent with ADLs;Independent with functional mobility;Needs assistance with IADLS   Lives With Family;Daughter  (3 grandchildren)   Receives Help From Family   Falls in the last 6 months 0   General   Family/Caregiver Present No   Cognition   Orientation Level Oriented X4   Following Commands Follows one step commands without difficulty   RLE Assessment   RLE Assessment WFL   LLE Assessment   LLE Assessment WFL   Bed Mobility   Supine to Sit 6  Modified independent   Additional items HOB elevated;Bedrails;Increased time required   Sit to Supine 6  Modified independent   Additional items HOB elevated;Bedrails;Increased time required   Additional Comments pt declining to sit OOB at this time   Transfers   Sit to Stand 6  Modified independent   Additional items Increased time required   Stand to Sit 6  Modified independent   Additional items Increased time required   Additional Comments w/ RW   Ambulation/Elevation   Gait pattern Wide HANNAH;Forward Flexion;Step through pattern   Gait Assistance 5  Supervision   Assistive Device Rolling walker   Distance 200'   Stair Management Assistance Not tested   Balance   Static Sitting Fair +   Dynamic Sitting Fair   Static Standing Fair -   Dynamic Standing Fair -   Ambulatory Fair -   Activity Tolerance   Activity Tolerance Patient limited by fatigue;Patient tolerated treatment well   Nurse Made Aware RN cleared and updated   Assessment   Prognosis Good   Problem List Decreased  strength;Decreased endurance;Impaired balance;Decreased mobility;Pain   Assessment Pt seen for PT evaluation. Pt with active PT eval/treat orders. Pt is a 64 y.o. male who was admitted to [facility] on 11/1/24. Pt's current dx/ problem list include: bowel obstruction. Comorbidities affecting pt's physical performance at time of assessment are as follows: has a past medical history of Alcohol abuse, Anxiety, Asthma, Cardiac disease, Cervical stenosis (uterine cervix), Chest pain, Chronic kidney disease, Colitis, Colon polyps, COPD (chronic obstructive pulmonary disease) ([facility]), Coronary artery disease, Diverticulitis, Esophageal reflux, 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HCC), Heart failure, systolic, due to idiopathic cardiomyopathy ([facility]), Hyperlipidemia, Hypertension, IBD (inflammatory bowel disease), Myocardial infarction ([facility]), Old myocardial infarction, Paroxysmal atrial fibrillation ([facility]), Perforation of colon ([facility]), Pericarditis, Type 2 diabetes, diet controlled ([facility]), Ulcerative colitis ([facility]), and VT (ventricular tachycardia) ([facility]). Personal factors affecting pt at time of initial examination include: inability to perform IADLs, inability to perform ADLs, inability to ambulate household distances. Due to acute medical issues, ongoing medical workup for primary dx; pain, fall risk, decreased activity tolerance compared to baseline, increased assistance needed from caregiver at current time, multiple lines, decline in overall functional mobility status; health management issues. At baseline, pt resides in a [facility] and was independent with ADLs/ IADLs. Currently, upon initial examination, pt is requiring mod I for supine to sit and sit to stand and supervision for ambulation. No further acute PT needs at this time. Patient would benefit from continued mobilization with restorative and nursing staff. Patient at or near his functional baseline. Recommend no post acute rehabilitation needs. Please also refer to the  recommendation of the Physical Therapist for safe discharge planning.   Barriers to Discharge None   Goals   Patient Goals to go home   Plan   PT Frequency Other (Comment)  (PT EVAL ONLY)   Discharge Recommendation   Rehab Resource Intensity Level, PT No post-acute rehabilitation needs   Equipment Recommended Walker  (pt owns)   AM-PAC Basic Mobility Inpatient   Turning in Flat Bed Without Bedrails 4   Lying on Back to Sitting on Edge of Flat Bed Without Bedrails 4   Moving Bed to Chair 3   Standing Up From Chair Using Arms 3   Walk in Room 3   Climb 3-5 Stairs With Railing 3   Basic Mobility Inpatient Raw Score 20   Basic Mobility Standardized Score 43.99   [facility] Highest Level Of Mobility   -HLM Goal 6: Walk 10 steps or more   JH-HLM Achieved 7: Walk 25 feet or more         Juana Belle, PT

## 2024-11-03 NOTE — UTILIZATION REVIEW
NOTIFICATION OF INPATIENT ADMISSION   AUTHORIZATION REQUEST   SERVICING FACILITY:   [facility]  Address: [address] [place] [address]  Tax ID: [id number]  NPI: [id number] ATTENDING PROVIDER:  Attending Name and NPI#: Radha Bird Do [[id number]]  Address: [address] [place] [address]  Phone: [phone]   ADMISSION INFORMATION:  Place of Service: Inpatient [facility]  Place of Service Code: 21  Inpatient Admission Date/Time: 11/1/24 11:58 PM  Discharge Date/Time: No discharge date for patient encounter.  Admitting Diagnosis Code/Description:  Bowel obstruction (HCC) [K56.609]  Abdominal pain [R10.9]  Partial small bowel obstruction (HCC) [K56.600]  Intestinal obstruction, unspecified cause, unspecified whether partial or complete (HCC) [K56.609]     UTILIZATION REVIEW CONTACT:  Srinivasan Jimenez Utilization   Network Utilization Review Department  Phone: [phone]  Fax: [phone]  Email: [email or web address]  Contact for approvals/pending authorizations, clinical reviews, and discharge.     PHYSICIAN ADVISORY SERVICES:  Medical Necessity Denial & Lvro-lp-Gmjn Review  Phone: [phone]  Fax: [phone]  Email: [email or web address]     DISCHARGE SUPPORT TEAM:  For Patients Discharge Needs & Updates  Phone: [phone] opt. 2 Fax: [phone]  Email: [email or web address]

## 2024-11-03 NOTE — PLAN OF CARE
Problem: PAIN - ADULT  Goal: Verbalizes/displays adequate comfort level or baseline comfort level  Description: Interventions:  - Encourage patient to monitor pain and request assistance  - Assess pain using appropriate pain scale  - Administer analgesics based on type and severity of pain and evaluate response  - Implement non-pharmacological measures as appropriate and evaluate response  - Consider cultural and social influences on pain and pain management  - Notify physician/advanced practitioner if interventions unsuccessful or patient reports new pain  Outcome: Progressing     Problem: INFECTION - ADULT  Goal: Absence or prevention of progression during hospitalization  Description: INTERVENTIONS:  - Assess and monitor for signs and symptoms of infection  - Monitor lab/diagnostic results  - Monitor all insertion sites, i.e. indwelling lines, tubes, and drains  - Monitor endotracheal if appropriate and nasal secretions for changes in amount and color  - Superior appropriate cooling/warming therapies per order  - Administer medications as ordered  - Instruct and encourage patient and family to use good hand hygiene technique  - Identify and instruct in appropriate isolation precautions for identified infection/condition  Outcome: Progressing  Goal: Absence of fever/infection during neutropenic period  Description: INTERVENTIONS:  - Monitor WBC    Outcome: Progressing     Problem: SAFETY ADULT  Goal: Patient will remain free of falls  Description: INTERVENTIONS:  - Educate patient/family on patient safety including physical limitations  - Instruct patient to call for assistance with activity   - Consult OT/PT to assist with strengthening/mobility   - Keep Call bell within reach  - Keep bed low and locked with side rails adjusted as appropriate  - Keep care items and personal belongings within reach  - Initiate and maintain comfort rounds  - Make Fall Risk Sign visible to staff  - Offer Toileting every 2 Hours,  in advance of need  - Initiate/Maintain bed alarm  - Obtain necessary fall risk management equipment  - Apply yellow socks and bracelet for high fall risk patients  - Consider moving patient to room near nurses station  Outcome: Progressing  Goal: Maintain or return to baseline ADL function  Description: INTERVENTIONS:  -  Assess patient's ability to carry out ADLs; assess patient's baseline for ADL function and identify physical deficits which impact ability to perform ADLs (bathing, care of mouth/teeth, toileting, grooming, dressing, etc.)  - Assess/evaluate cause of self-care deficits   - Assess range of motion  - Assess patient's mobility; develop plan if impaired  - Assess patient's need for assistive devices and provide as appropriate  - Encourage maximum independence but intervene and supervise when necessary  - Involve family in performance of ADLs  - Assess for home care needs following discharge   - Consider OT consult to assist with ADL evaluation and planning for discharge  - Provide patient education as appropriate  Outcome: Progressing  Goal: Maintains/Returns to pre admission functional level  Description: INTERVENTIONS:  - Perform AM-PAC 6 Click Basic Mobility/ Daily Activity assessment daily.  - Set and communicate daily mobility goal to care team and patient/family/caregiver.   - Collaborate with rehabilitation services on mobility goals if consulted  - Ambulate patient 3 times a day  - Out of bed to chair 3 times a day   - Out of bed for meals 3 times a day  - Out of bed for toileting  - Record patient progress and toleration of activity level   Outcome: Progressing     Problem: DISCHARGE PLANNING  Goal: Discharge to home or other facility with appropriate resources  Description: INTERVENTIONS:  - Identify barriers to discharge w/patient and caregiver  - Arrange for needed discharge resources and transportation as appropriate  - Identify discharge learning needs (meds, wound care, etc.)  - Arrange  for interpretive services to assist at discharge as needed  - Refer to Case Management Department for coordinating discharge planning if the patient needs post-hospital services based on physician/advanced practitioner order or complex needs related to functional status, cognitive ability, or social support system  Outcome: Progressing     Problem: Knowledge Deficit  Goal: Patient/family/caregiver demonstrates understanding of disease process, treatment plan, medications, and discharge instructions  Description: Complete learning assessment and assess knowledge base.  Interventions:  - Provide teaching at level of understanding  - Provide teaching via preferred learning methods  Outcome: Progressing     Problem: Nutrition/Hydration-ADULT  Goal: Nutrient/Hydration intake appropriate for improving, restoring or maintaining nutritional needs  Description: Monitor and assess patient's nutrition/hydration status for malnutrition. Collaborate with interdisciplinary team and initiate plan and interventions as ordered.  Monitor patient's weight and dietary intake as ordered or per policy. Utilize nutrition screening tool and intervene as necessary. Determine patient's food preferences and provide high-protein, high-caloric foods as appropriate.     INTERVENTIONS:  - Monitor oral intake, urinary output, labs, and treatment plans  - Assess nutrition and hydration status and recommend course of action  - Evaluate amount of meals eaten  - Assist patient with eating if necessary   - Allow adequate time for meals  - Recommend/ encourage appropriate diets, oral nutritional supplements, and vitamin/mineral supplements  - Order, calculate, and assess calorie counts as needed  - Recommend, monitor, and adjust tube feedings and TPN/PPN based on assessed needs  - Assess need for intravenous fluids  - Provide specific nutrition/hydration education as appropriate  - Include patient/family/caregiver in decisions related to  nutrition  Outcome: Progressing

## 2024-11-03 NOTE — UTILIZATION REVIEW
Initial Clinical Review    Admission: Date/Time/Statement:   Admission Orders (From admission, onward)       Ordered        11/01/24 2358  Inpatient Admission  Once                          Orders Placed This Encounter   Procedures    Inpatient Admission     Standing Status:   Standing     Number of Occurrences:   1     Order Specific Question:   Level of Care     Answer:   Level 1 Stepdown [13]     Order Specific Question:   Estimated length of stay     Answer:   More than 2 Midnights     Order Specific Question:   Certification     Answer:   I certify that inpatient services are medically necessary for this patient for a duration of greater than two midnights. See H&P and MD Progress Notes for additional information about the patient's course of treatment.     ED Arrival Information       Expected   11/1/2024     Arrival   11/1/2024 16:27    Acuity   Urgent              Means of arrival   Ambulance    Escorted by   [facility]    Service   Surgery-General    Admission type   Emergency              Arrival complaint   Abdominal Pain             Chief Complaint   Patient presents with    Abdominal Pain     Sharp lower abd pain since this morning. States he also had pain trying to have BM this morning. Is able to pass gas.       Initial Presentation: 64 y.o. male who presented by EMS to [facility] ED. Admitted as Inpatient for evaluation and treatment of bowel obstruction. PMHx: AUD, asthma, CKD, COPD, CAD, diverticulitis, CHF, HLD, HTN, Crohn's s/p ileal colectomy with anastomosis and  ileostomy which has been reversed, MI, afib, T2DM. Presented w/ abdominal pain intermittently since this morning. Last BM 1 hour PTA. Does note some lightheadedness and dizziness. Tolerating liquids. EXAM: tachycardic, RLQ tenderness, abdominal distnetion, ventral hernia. Labs: WBC 14.63, UA indicative of UTI. Imaging: high-grade closed-loop obstruction. PLAN: admit to Level 1 Stepdown; NPO, NGT  to suction, IVF, analgesics, antiemetics, IV ABX, SSI w/ BG checks q6h, IV PPI. DW. I&O. High risk surgically given hx Crohn's disease, ileocolectomy and ileocolonic anastomosis in 2018. Prescribed sulfasalazine but does not take, hold for now.     Date: 11/2   Day 2: Tachycardic to 110s. Stable on 4L O2 NC. Notes pain only if he presses on lower abdomen. Notes passing flatus but no BM. Exam: tachycardic, abdominal tenderness near belt line, abdominal distention, midline incisional hernia. WBC 12.51, Crt 1.32, Mag 1.5. Plan: NPO, NGT to suction, IVF, IV ABX, follow cultures, analgesics, antiemetics, obtain CTAP w/ PO contrast. Trend labs, replete electrolytes as needed. IV mag 2 g x1. 1L IVF bolus x1. IV KCl 20 mEq x1. IV dilaudid 0.2 mg x5. IV zofran x1.     ED Treatment-Medication Administration from 11/01/2024 1627 to 11/02/2024 0104         Date/Time Order Dose Route Action     11/01/2024 1703 morphine injection 2 mg 2 mg Intravenous Given     11/01/2024 1703 acetaminophen (TYLENOL) tablet 975 mg 975 mg Oral Given     11/01/2024 2000 iohexol (OMNIPAQUE) 350 MG/ML injection (SINGLE-DOSE) 100 mL 100 mL Intravenous Given     11/01/2024 2046 ipratropium-albuterol (DUO-NEB) 0.5-2.5 mg/3 mL inhalation solution 3 mL 3 mL Nebulization Given     11/01/2024 2046 nicotine (NICODERM CQ) 7 mg/24hr TD 24 hr patch 7 mg 7 mg Transdermal Medication Applied     11/01/2024 2328 multi-electrolyte (ISOLYTE-S PH 7.4) bolus 1,000 mL 1,000 mL Intravenous New Bag     11/01/2024 2328 lidocaine (URO-JET) 2 % urethral/mucosal gel 5 Application Topical Given     11/01/2024 2328 ceftriaxone (ROCEPHIN) 1 g/50 mL in dextrose IVPB 1,000 mg Intravenous New Bag            Scheduled Medications:  acetaminophen, 1,000 mg, Intravenous, Q6H JUVENTINO   cefTRIAXone, 1,000 mg, Intravenous, Q24H  chlorhexidine, 15 mL, Mouth/Throat, Q12H JUVENTINO  enoxaparin, 40 mg, Subcutaneous, Daily  fluticasone-vilanterol, 1 puff, Inhalation, Daily   And  umeclidinium, 1 puff,  Inhalation, Daily  insulin lispro, 1-6 Units, Subcutaneous, Q6H JUVENTINO  ipratropium, 0.5 mg, Nebulization, TID  levalbuterol, 1.25 mg, Nebulization, TID   nicotine, 1 patch, Transdermal, Daily  pantoprazole, 40 mg, Intravenous, Q24H JUVENTINO    Continuous IV Infusions:  multi-electrolyte, 75 mL/hr, Intravenous, Continuous    PRN Meds:  albuterol, 2 puff, Inhalation, Q4H PRN; 11/2 x1  albuterol, 2.5 mg, Nebulization, Q4H PRN; 11/2 x1  budesonide-formoterol, 2 puff, Inhalation, Daily PRN; 11/2 x1  HYDROmorphone, 0.2 mg, Intravenous, Q2H PRN; 11/2 x5  magnesium sulfate, 2 g, Intravenous; 11/2 x1  multi-electrolyte, 1,000 mL bolus, Intravenous; 11/2 x1  ondansetron, 4 mg, Intravenous, Q6H PRN; 11/2 x1  potassium chloride, 20 mEq, Intravenous; 11/2 x1  trimethobenzamide, 200 mg, Intramuscular, Q6H PRN      ED Triage Vitals [11/01/24 1644]   Temperature Pulse Respirations Blood Pressure SpO2 Pain Score   98.4 °F (36.9 °C) 100 18 144/83 95 % 8     Weight (last 2 days)       Date/Time Weight    11/02/24 0545 80.9 (178.35)    11/01/24 2359 80.9 (178.35)            Vital Signs (last 3 days)       Date/Time Temp Pulse Resp BP MAP (mmHg) SpO2 Calculated FIO2 (%) - Nasal Cannula Nasal Cannula O2 Flow Rate (L/min) O2 Device Patient Position - Orthostatic VS Dina Coma Scale Score Pain    11/03/24 0718 -- -- -- -- -- 96 % -- -- Nasal cannula -- -- --    11/03/24 07:17:29 98.2 °F (36.8 °C) 78 17 101/62 75 92 % 36 4 L/min Nasal cannula -- -- --    11/03/24 0429 -- -- -- -- -- 93 % 36 4 L/min Nasal cannula -- 15 --    11/03/24 02:48:10 97.9 °F (36.6 °C) 83 19 100/65 77 94 % 36 4 L/min Nasal cannula Lying -- --    11/03/24 00:27:48 99.3 °F (37.4 °C) 95 17 102/54 70 94 % -- -- -- -- -- --    11/02/24 22:20:40 98.8 °F (37.1 °C) 105 14 91/71 78 91 % -- -- -- -- -- --    11/02/24 2033 -- -- -- -- -- 97 % 36 4 L/min Nasal cannula -- 15 5    11/02/24 2023 -- -- -- -- -- 93 % -- -- -- -- -- --    11/02/24 19:27:01 -- 110 14 106/78 87 87 % -- -- --  -- -- --    11/02/24 1620 -- -- -- -- -- -- -- -- -- -- -- 10 - Worst Possible Pain    11/02/24 1600 -- -- -- -- -- -- -- -- -- -- 15 --    11/02/24 1500 98 °F (36.7 °C) -- -- -- -- -- -- -- -- -- -- --    11/02/24 1200 -- -- -- -- -- -- -- -- -- -- 15 --    11/02/24 1045 98.4 °F (36.9 °C) -- 19 121/65 -- -- -- -- -- Lying -- --    11/02/24 0817 -- -- -- -- -- -- 36 4 L/min Nasal cannula -- -- --    11/02/24 0804 -- -- -- -- -- -- -- -- -- -- -- 6    11/02/24 0800 -- -- -- -- -- -- -- -- -- -- 15 --    11/02/24 0745 98.3 °F (36.8 °C) 110 22 119/60 -- 98 % -- -- -- Lying -- --    11/02/24 0255 -- -- -- -- -- 98 % -- -- -- -- -- --    11/02/24 0100 -- -- -- -- -- -- -- -- -- -- 15 6    11/01/24 2048 -- 114 21 106/67 83 98 % 36 4 L/min Nasal cannula Lying -- --    11/01/24 2046 -- 115 -- 106/67 -- -- -- -- -- -- -- --    11/01/24 1910 -- 116 20 134/85 -- 98 % 36 4 L/min Nasal cannula Lying -- --    11/01/24 1904 -- -- -- -- -- 97 % -- -- -- -- -- --    11/01/24 1644 98.4 °F (36.9 °C) 100 18 144/83 -- 95 % 36 4 L/min Nasal cannula -- -- 8              Pertinent Labs/Diagnostic Test Results:   Radiology:  XR chest portable   Final Interpretation by Raúl Sepulveda MD (11/02 1232)      Feeding tube tip terminating in the stomach.            Workstation performed: [id number]         CT abdomen pelvis with contrast   Final Interpretation by Latrice Alvarez MD (11/01 2136)         1. Focally dilated small bowel loop in the mid and right abdomen, suggesting a high-grade obstruction which could be related to obstruction of the proximal and distal segments due to adhesions versus closed-loop obstruction. There is associated stranding    of the mesentery and small amount of fluid. No small bowel pneumatosis.   2. Stable findings consistent with bronchiolitis with right lower lobe lung nodule and impacted bronchus in the right lower lobe. Follow-up evaluation was recommended at the time of CT chest in September 2024.      I  personally discussed this study with Alonso Garzon on 11/1/2024 9:35 PM.               Workstation performed: [id number]         XR abdomen 1 view kub    (Results Pending)   XR abdomen 1 view kub    (Results Pending)           Results from last 7 days   Lab Units 11/03/24  0627 11/02/24  1650 11/02/24  0510 11/02/24  0136 11/01/24  1703   WBC Thousand/uL 8.47 18.16* 12.51*  --  14.63*   HEMOGLOBIN g/dL 11.3* 12.4 11.3*  --  12.3   HEMATOCRIT % 37.2 40.7 36.7  --  39.7   PLATELETS Thousands/uL 178 220 218   < > 226   TOTAL NEUT ABS Thousands/µL  --  15.30* 9.47*  --  11.67*    < > = values in this interval not displayed.         Results from last 7 days   Lab Units 11/03/24  0627 11/02/24  1650 11/02/24  1439 11/02/24  0510 11/02/24  0136   SODIUM mmol/L 143 142 141 140 141   POTASSIUM mmol/L 4.2 4.7 5.0 3.9 4.3   CHLORIDE mmol/L 103 100 103 102 103   CO2 mmol/L 33* 31 31 31 29   ANION GAP mmol/L 7 11 7 7 9   BUN mg/dL 21 22 20 18 19   CREATININE mg/dL 1.28 1.53* 1.30 1.32* 1.31*   EGFR ml/min/1.73sq m 59 48 58 57 57   CALCIUM mg/dL 7.8* 8.9 8.7 8.4 8.5   CALCIUM, IONIZED mmol/L 1.06*  --   --   --   --    MAGNESIUM mg/dL 2.5 2.7  --  1.5*  --    PHOSPHORUS mg/dL 3.7 4.3*  --  3.2  --      Results from last 7 days   Lab Units 11/02/24  0510 11/01/24  1703   AST U/L 13 12*   ALT U/L 12 13   ALK PHOS U/L 81 84   TOTAL PROTEIN g/dL 6.6 6.8   ALBUMIN g/dL 3.7 4.1   TOTAL BILIRUBIN mg/dL 0.47 0.53     Results from last 7 days   Lab Units 11/03/24  0621 11/02/24  2331 11/02/24  1953 11/02/24  1149   POC GLUCOSE mg/dl 86 104 114 106     Results from last 7 days   Lab Units 11/03/24  0627 11/02/24  1650 11/02/24  1439 11/02/24  0510 11/02/24  0136 11/01/24  1703   GLUCOSE RANDOM mg/dL 86 122 107 116 95 125       Results from last 7 days   Lab Units 11/02/24  1650 11/02/24  0510 11/02/24  0136   PH SANAZ  7.277* 7.381 7.345   PCO2 SANAZ mm Hg 66.6* 47.8 55.6*   PO2 SANAZ mm Hg 29.5* 40.9 46.4*   HCO3 SANAZ mmol/L 30.4* 27.7 29.7    BASE EXC SANAZ mmol/L 1.9 2.0 2.9   O2 CONTENT SANAZ ml/dL 8.7 13.9 12.9   O2 HGB, VENOUS % 47.6* 74.4 77.8     Results from last 7 days   Lab Units 11/02/24  1955 11/02/24  1650 11/02/24  0510 11/02/24  0136 11/01/24 2155   LACTIC ACID mmol/L 2.0 2.9* 0.8 0.8 0.7       Results from last 7 days   Lab Units 11/01/24 2052   CLARITY UA  Turbid   COLOR UA  Yellow   SPEC GRAV UA  1.024   PH UA  5.5   GLUCOSE UA mg/dl Negative   KETONES UA mg/dl Negative   BLOOD UA  Negative   PROTEIN UA mg/dl 30 (1+)*   NITRITE UA  Positive*   BILIRUBIN UA  Negative   UROBILINOGEN UA (BE) mg/dl <2.0   LEUKOCYTES UA  Large*   WBC UA /hpf Innumerable*   RBC UA /hpf None Seen   BACTERIA UA /hpf Occasional   EPITHELIAL CELLS WET PREP /hpf Occasional   MUCUS THREADS  Moderate*     Results from last 7 days   Lab Units 11/01/24 2321 11/01/24 2052   BLOOD CULTURE  No Growth at 24 hrs.  No Growth at 24 hrs.  --    URINE CULTURE   --  >100,000 cfu/ml Gram Negative Young Enteric Like*         Past Medical History:   Diagnosis Date    Alcohol abuse 05/15/2018    Anxiety     Asthma     Cardiac disease     Cervical stenosis (uterine cervix)     Chest pain     Chronic kidney disease     Colitis     Colon polyps     COPD (chronic obstructive pulmonary disease) ([facility])     2L @ HS and PRN    Coronary artery disease     Diverticulitis     Esophageal reflux     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HCC)     Heart failure, systolic, due to idiopathic cardiomyopathy ([facility]) 05/21/2018    Hyperlipidemia     Hypertension     IBD (inflammatory bowel disease)     Myocardial infarction ([facility])     Old myocardial infarction     Paroxysmal atrial fibrillation ([facility]) 08/24/2018    Perforation of colon ([facility])     Pericarditis 12/07/2016    Type 2 diabetes, diet controlled ([facility])     Ulcerative colitis ([facility])     VT (ventricular tachycardia) ([facility]) 12/08/2023       Admitting Diagnosis: Bowel obstruction ([facility]) [K56.609]  Abdominal pain [R10.9]  Partial small bowel obstruction ([facility])  [K56.600]  Intestinal obstruction, unspecified cause, unspecified whether partial or complete (HCC) [K56.609]  Age/Sex: 64 y.o. male    Network Utilization Review Department  ATTENTION: Please call with any questions or concerns to [phone] and carefully listen to the prompts so that you are directed to the right person. All voicemails are confidential.   For Discharge needs, contact Care Management DC Support Team at [phone] opt. [phone]  Send all requests for admission clinical reviews, approved or denied determinations and any other requests to dedicated fax number below belonging to the campus where the patient is receiving treatment. List of dedicated fax numbers for the Facilities:  FACILITY NAME UR FAX NUMBER   ADMISSION DENIALS (Administrative/Medical Necessity) [phone]   DISCHARGE SUPPORT TEAM (NETWORK) [phone]   PARENT CHILD HEALTH (Maternity/NICU/Pediatrics) [phone]   [facility] [phone]   [facility] [phone]   [facility] [phone]   General acute hospital [phone]   [facility] [phone]   [facility] [phone]   [facility] [phone]   [facility] [phone]   [facility] [phone]   [facility] [phone]   [facility] [phone]   [facility] [phone]

## 2024-11-03 NOTE — PLAN OF CARE
Problem: PAIN - ADULT  Goal: Verbalizes/displays adequate comfort level or baseline comfort level  Description: Interventions:  - Encourage patient to monitor pain and request assistance  - Assess pain using appropriate pain scale  - Administer analgesics based on type and severity of pain and evaluate response  - Implement non-pharmacological measures as appropriate and evaluate response  - Consider cultural and social influences on pain and pain management  - Notify physician/advanced practitioner if interventions unsuccessful or patient reports new pain  Outcome: Progressing     Problem: INFECTION - ADULT  Goal: Absence or prevention of progression during hospitalization  Description: INTERVENTIONS:  - Assess and monitor for signs and symptoms of infection  - Monitor lab/diagnostic results  - Monitor all insertion sites, i.e. indwelling lines, tubes, and drains  - Monitor endotracheal if appropriate and nasal secretions for changes in amount and color  - [place] appropriate cooling/warming therapies per order  - Administer medications as ordered  - Instruct and encourage patient and family to use good hand hygiene technique  - Identify and instruct in appropriate isolation precautions for identified infection/condition  Outcome: Progressing  Goal: Absence of fever/infection during neutropenic period  Description: INTERVENTIONS:  - Monitor WBC    Outcome: Progressing     Problem: SAFETY ADULT  Goal: Patient will remain free of falls  Description: INTERVENTIONS:  - Educate patient/family on patient safety including physical limitations  - Instruct patient to call for assistance with activity   - Consult OT/PT to assist with strengthening/mobility   - Keep Call bell within reach  - Keep bed low and locked with side rails adjusted as appropriate  - Keep care items and personal belongings within reach  - Initiate and maintain comfort rounds  - Make Fall Risk Sign visible to staff  - Offer Toileting every 2 Hours,  in advance of need  - Initiate/Maintain 2alarm  - Obtain necessary fall risk management equipment: 2  - Apply yellow socks and bracelet for high fall risk patients  - Consider moving patient to room near nurses station  Outcome: Progressing  Goal: Maintain or return to baseline ADL function  Description: INTERVENTIONS:  -  Assess patient's ability to carry out ADLs; assess patient's baseline for ADL function and identify physical deficits which impact ability to perform ADLs (bathing, care of mouth/teeth, toileting, grooming, dressing, etc.)  - Assess/evaluate cause of self-care deficits   - Assess range of motion  - Assess patient's mobility; develop plan if impaired  - Assess patient's need for assistive devices and provide as appropriate  - Encourage maximum independence but intervene and supervise when necessary  - Involve family in performance of ADLs  - Assess for home care needs following discharge   - Consider OT consult to assist with ADL evaluation and planning for discharge  - Provide patient education as appropriate  Outcome: Progressing  Goal: Maintains/Returns to pre admission functional level  Description: INTERVENTIONS:  - Perform AM-PAC 6 Click Basic Mobility/ Daily Activity assessment daily.  - Set and communicate daily mobility goal to care team and patient/family/caregiver.   - Collaborate with rehabilitation services on mobility goals if consulted  - Perform Range of Motion 2 times a day.  - Reposition patient every 2 hours.  - Dangle patient 2 times a day  - Stand patient 2times a day  - Ambulate patient 2 times a day  - Out of bed to chair 2 times a day   - Out of bed for meals 2 times a day  - Out of bed for toileting  - Record patient progress and toleration of activity level   Outcome: Progressing     Problem: DISCHARGE PLANNING  Goal: Discharge to home or other facility with appropriate resources  Description: INTERVENTIONS:  - Identify barriers to discharge w/patient and caregiver  -  Arrange for needed discharge resources and transportation as appropriate  - Identify discharge learning needs (meds, wound care, etc.)  - Arrange for interpretive services to assist at discharge as needed  - Refer to Case Management Department for coordinating discharge planning if the patient needs post-hospital services based on physician/advanced practitioner order or complex needs related to functional status, cognitive ability, or social support system  Outcome: Progressing     Problem: Knowledge Deficit  Goal: Patient/family/caregiver demonstrates understanding of disease process, treatment plan, medications, and discharge instructions  Description: Complete learning assessment and assess knowledge base.  Interventions:  - Provide teaching at level of understanding  - Provide teaching via preferred learning methods  Outcome: Progressing     Problem: Nutrition/Hydration-ADULT  Goal: Nutrient/Hydration intake appropriate for improving, restoring or maintaining nutritional needs  Description: Monitor and assess patient's nutrition/hydration status for malnutrition. Collaborate with interdisciplinary team and initiate plan and interventions as ordered.  Monitor patient's weight and dietary intake as ordered or per policy. Utilize nutrition screening tool and intervene as necessary. Determine patient's food preferences and provide high-protein, high-caloric foods as appropriate.     INTERVENTIONS:  - Monitor oral intake, urinary output, labs, and treatment plans  - Assess nutrition and hydration status and recommend course of action  - Evaluate amount of meals eaten  - Assist patient with eating if necessary   - Allow adequate time for meals  - Recommend/ encourage appropriate diets, oral nutritional supplements, and vitamin/mineral supplements  - Order, calculate, and assess calorie counts as needed  - Recommend, monitor, and adjust tube feedings and TPN/PPN based on assessed needs  - Assess need for intravenous  fluids  - Provide specific nutrition/hydration education as appropriate  - Include patient/family/caregiver in decisions related to nutrition  Outcome: Progressing

## 2024-11-04 LAB
ANION GAP SERPL CALCULATED.3IONS-SCNC: 11 MMOL/L (ref 4–13)
BACTERIA UR CULT: ABNORMAL
BACTERIA UR CULT: ABNORMAL
BASOPHILS # BLD AUTO: 0.03 THOUSANDS/ΜL (ref 0–0.1)
BASOPHILS NFR BLD AUTO: 0 % (ref 0–1)
BUN SERPL-MCNC: 13 MG/DL (ref 5–25)
CALCIUM SERPL-MCNC: 7.7 MG/DL (ref 8.4–10.2)
CHLORIDE SERPL-SCNC: 102 MMOL/L (ref 96–108)
CO2 SERPL-SCNC: 30 MMOL/L (ref 21–32)
CREAT SERPL-MCNC: 1.03 MG/DL (ref 0.6–1.3)
EOSINOPHIL # BLD AUTO: 0.21 THOUSAND/ΜL (ref 0–0.61)
EOSINOPHIL NFR BLD AUTO: 3 % (ref 0–6)
ERYTHROCYTE [DISTWIDTH] IN BLOOD BY AUTOMATED COUNT: 17.8 % (ref 11.6–15.1)
GFR SERPL CREATININE-BSD FRML MDRD: 77 ML/MIN/1.73SQ M
GLUCOSE SERPL-MCNC: 107 MG/DL (ref 65–140)
GLUCOSE SERPL-MCNC: 140 MG/DL (ref 65–140)
GLUCOSE SERPL-MCNC: 147 MG/DL (ref 65–140)
GLUCOSE SERPL-MCNC: 162 MG/DL (ref 65–140)
GLUCOSE SERPL-MCNC: 190 MG/DL (ref 65–140)
GLUCOSE SERPL-MCNC: 71 MG/DL (ref 65–140)
HCT VFR BLD AUTO: 34.3 % (ref 36.5–49.3)
HGB BLD-MCNC: 10.4 G/DL (ref 12–17)
IMM GRANULOCYTES # BLD AUTO: 0.03 THOUSAND/UL (ref 0–0.2)
IMM GRANULOCYTES NFR BLD AUTO: 0 % (ref 0–2)
LYMPHOCYTES # BLD AUTO: 1.3 THOUSANDS/ΜL (ref 0.6–4.47)
LYMPHOCYTES NFR BLD AUTO: 18 % (ref 14–44)
MCH RBC QN AUTO: 25.2 PG (ref 26.8–34.3)
MCHC RBC AUTO-ENTMCNC: 30.3 G/DL (ref 31.4–37.4)
MCV RBC AUTO: 83 FL (ref 82–98)
MONOCYTES # BLD AUTO: 0.84 THOUSAND/ΜL (ref 0.17–1.22)
MONOCYTES NFR BLD AUTO: 12 % (ref 4–12)
NEUTROPHILS # BLD AUTO: 4.7 THOUSANDS/ΜL (ref 1.85–7.62)
NEUTS SEG NFR BLD AUTO: 67 % (ref 43–75)
NRBC BLD AUTO-RTO: 0 /100 WBCS
PLATELET # BLD AUTO: 175 THOUSANDS/UL (ref 149–390)
PMV BLD AUTO: 11.4 FL (ref 8.9–12.7)
POTASSIUM SERPL-SCNC: 3.7 MMOL/L (ref 3.5–5.3)
RBC # BLD AUTO: 4.13 MILLION/UL (ref 3.88–5.62)
SODIUM SERPL-SCNC: 143 MMOL/L (ref 135–147)
WBC # BLD AUTO: 7.11 THOUSAND/UL (ref 4.31–10.16)

## 2024-11-04 PROCEDURE — 97166 OT EVAL MOD COMPLEX 45 MIN: CPT

## 2024-11-04 PROCEDURE — 99231 SBSQ HOSP IP/OBS SF/LOW 25: CPT | Performed by: SURGERY

## 2024-11-04 PROCEDURE — 94760 N-INVAS EAR/PLS OXIMETRY 1: CPT

## 2024-11-04 PROCEDURE — 94664 DEMO&/EVAL PT USE INHALER: CPT

## 2024-11-04 PROCEDURE — 82948 REAGENT STRIP/BLOOD GLUCOSE: CPT

## 2024-11-04 PROCEDURE — 85025 COMPLETE CBC W/AUTO DIFF WBC: CPT

## 2024-11-04 PROCEDURE — 94640 AIRWAY INHALATION TREATMENT: CPT

## 2024-11-04 PROCEDURE — 80048 BASIC METABOLIC PNL TOTAL CA: CPT

## 2024-11-04 RX ORDER — ACETAMINOPHEN 325 MG/1
975 TABLET ORAL EVERY 6 HOURS SCHEDULED
Status: ACTIVE | OUTPATIENT
Start: 2024-11-04 | End: 2024-11-04

## 2024-11-04 RX ADMIN — NICOTINE 1 PATCH: 21 PATCH, EXTENDED RELEASE TRANSDERMAL at 10:18

## 2024-11-04 RX ADMIN — ACETAMINOPHEN 1000 MG: 10 INJECTION INTRAVENOUS at 00:01

## 2024-11-04 RX ADMIN — LEVALBUTEROL HYDROCHLORIDE 1.25 MG: 1.25 SOLUTION RESPIRATORY (INHALATION) at 18:55

## 2024-11-04 RX ADMIN — FLUTICASONE FUROATE AND VILANTEROL TRIFENATATE 1 PUFF: 200; 25 POWDER RESPIRATORY (INHALATION) at 11:29

## 2024-11-04 RX ADMIN — IPRATROPIUM BROMIDE 0.5 MG: 0.5 SOLUTION RESPIRATORY (INHALATION) at 18:56

## 2024-11-04 RX ADMIN — IPRATROPIUM BROMIDE 0.5 MG: 0.5 SOLUTION RESPIRATORY (INHALATION) at 15:05

## 2024-11-04 RX ADMIN — ACETAMINOPHEN 1000 MG: 10 INJECTION INTRAVENOUS at 06:10

## 2024-11-04 RX ADMIN — ACETAMINOPHEN 1000 MG: 10 INJECTION INTRAVENOUS at 12:20

## 2024-11-04 RX ADMIN — ENOXAPARIN SODIUM 40 MG: 40 INJECTION SUBCUTANEOUS at 10:20

## 2024-11-04 RX ADMIN — CEFTRIAXONE SODIUM 1000 MG: 10 INJECTION, POWDER, FOR SOLUTION INTRAVENOUS at 21:30

## 2024-11-04 RX ADMIN — UMECLIDINIUM 1 PUFF: 62.5 AEROSOL, POWDER ORAL at 11:29

## 2024-11-04 RX ADMIN — CHLORHEXIDINE GLUCONATE 0.12% ORAL RINSE 15 ML: 1.2 LIQUID ORAL at 21:29

## 2024-11-04 RX ADMIN — HYDROMORPHONE HYDROCHLORIDE 0.2 MG: 0.2 INJECTION, SOLUTION INTRAMUSCULAR; INTRAVENOUS; SUBCUTANEOUS at 18:18

## 2024-11-04 RX ADMIN — PANTOPRAZOLE SODIUM 40 MG: 40 INJECTION, POWDER, FOR SOLUTION INTRAVENOUS at 10:19

## 2024-11-04 RX ADMIN — LEVALBUTEROL HYDROCHLORIDE 1.25 MG: 1.25 SOLUTION RESPIRATORY (INHALATION) at 15:05

## 2024-11-04 RX ADMIN — IPRATROPIUM BROMIDE 0.5 MG: 0.5 SOLUTION RESPIRATORY (INHALATION) at 07:24

## 2024-11-04 RX ADMIN — LEVALBUTEROL HYDROCHLORIDE 1.25 MG: 1.25 SOLUTION RESPIRATORY (INHALATION) at 07:24

## 2024-11-04 RX ADMIN — CHLORHEXIDINE GLUCONATE 0.12% ORAL RINSE 15 ML: 1.2 LIQUID ORAL at 10:15

## 2024-11-04 RX ADMIN — INSULIN LISPRO 2 UNITS: 100 INJECTION, SOLUTION INTRAVENOUS; SUBCUTANEOUS at 00:44

## 2024-11-04 NOTE — OCCUPATIONAL THERAPY NOTE
Occupational Therapy Evaluation     Patient Name: Mathew Bird  Today's Date: 11/4/2024  Problem List  Active Problems:    Bowel obstruction (HCC)    Past Medical History  Past Medical History:   Diagnosis Date    Alcohol abuse 05/15/2018    Anxiety     Asthma     Cardiac disease     Cervical stenosis (uterine cervix)     Chest pain     Chronic kidney disease     Colitis     Colon polyps     COPD (chronic obstructive pulmonary disease) (HCC)     2L @ HS and PRN    Coronary artery disease     Diverticulitis     Esophageal reflux     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HCC)     Heart failure, systolic, due to idiopathic cardiomyopathy (HCC) 05/21/2018    Hyperlipidemia     Hypertension     IBD (inflammatory bowel disease)     Myocardial infarction (HCC)     Old myocardial infarction     Paroxysmal atrial fibrillation (HCC) 08/24/2018    Perforation of colon (HCC)     Pericarditis 12/07/2016    Type 2 diabetes, diet controlled (HCC)     Ulcerative colitis (HCC)     VT (ventricular tachycardia) (HCC) 12/08/2023     Past Surgical History  Past Surgical History:   Procedure Laterality Date    ANGIOPLASTY      APPENDECTOMY      COLON SURGERY      COLONOSCOPY N/A 10/24/2016    Procedure: COLONOSCOPY;  Surgeon: Tirso Juárez MD;  Location: [facility];  Service:     CORONARY ANGIOPLASTY WITH STENT PLACEMENT      ESOPHAGOGASTRODUODENOSCOPY N/A 10/24/2016    Procedure: ESOPHAGOGASTRODUODENOSCOPY (EGD);  Surgeon: Tirso Juárez MD;  Location: [facility];  Service:     EXPLORATORY LAPAROTOMY W/ BOWEL RESECTION N/A 5/22/2018    Procedure: EXPLORATORY LAPAROTOMY, ILIOCOLECTOMY, ILIOCOLONIC ANASTAMOSIS, LOOP ILIOSTOMY, REPAIR OF SEROSAL TEAR, EXTENSIVE LYSIS OF ADHESIONS, WOUND VAC PLACEMENT;  Surgeon: Tirso Juárez MD;  Location: [facility];  Service: Colorectal    HEMICOLECTOMY      ILEOSTOMY CLOSURE N/A 10/5/2018    Procedure: CLOSURE ILEOSTOMY;  Surgeon: Tirso Juárez MD;  [facility]: [facility];  Service: Colorectal     OTHER SURGICAL HISTORY      stent indications acute myocardial infarction    PA COLONOSCOPY FLX DX W/COLLJ SPEC WHEN PFRMD N/A 2/6/2018    Procedure: COLONOSCOPY;  Surgeon: Tirso Juárez MD;  Location: [facility];  Service: Colorectal    PA COLONOSCOPY FLX DX W/COLLJ SPEC WHEN PFRMD N/A 10/4/2018    Procedure: COLONOSCOPY;  Surgeon: Tirso Juárez MD;  [facility]: [facility];  Service: Colorectal    TONSILLECTOMY          11/04/24 1040   OT Last Visit   OT Visit Date 11/04/24   Note Type   Note type Evaluation   Pain Assessment   Pain Assessment Tool 0-10   Pain Score No Pain   Restrictions/Precautions   Weight Bearing Precautions Per Order No   Other Precautions Multiple lines;O2;Fall Risk  (4LO2 at baseline)   Home Living   Type of Home House   Home Layout Two level;1/2 bath on main level;Performs ADLs on one level  (1 KAREN)   Bathroom Shower/Tub Tub/shower unit   Bathroom Toilet Standard   Bathroom Equipment Shower chair   Home Equipment Walker;Cane  (rollator - reports using rollator within the community)   Prior Function   Level of [place] Independent with ADLs;Independent with functional mobility;Needs assistance with IADLS   Lives With Family;Daughter  (+ three, adult grandchildren)   Receives Help From Family   IADLs Independent with driving;Independent with meal prep;Independent with medication management   Falls in the last 6 months 0   Lifestyle   Autonomy Pt reports I w/ ADLs, IADLs, and fxnl mobility w/ rollator (for community distances) at baseline; + driving.   Reciprocal Relationships Pt lives w/ his daughter and three, adult granchildren.   Intrinsic Gratification Pt enjoys watching TV.   General   Family/Caregiver Present No   ADL   Where Assessed Edge of bed   Eating Assistance 7  Independent   Grooming Assistance 6  Modified Independent   UB Bathing Assistance 6  Modified Independent   LB Bathing Assistance 5  Supervision/Setup   UB Dressing Assistance 6  Modified independent   LB  Dressing Assistance 5  Supervision/Setup   LB Dressing Deficit Setup;Don/doff R sock;Don/doff L sock   Toileting Assistance  5  Supervision/Setup   Bed Mobility   Supine to Sit 6  Modified independent   Additional items HOB elevated;Bedrails   Sit to Supine Unable to assess   Additional Comments Pt seated OOB in chair at end of OT evaluation w/ all needs within reach.   Transfers   Sit to Stand 6  Modified independent   Stand to Sit 6  Modified independent   Additional Comments x2 transfers completed t/o session w/ RW for support   Functional Mobility   Functional Mobility 5  Supervision   Additional Comments Pt ambulated greater than household distance w/ S using RW for support; x1 seated rest break while ambulating due to fatigue.   Additional items Rolling walker   Balance   Static Sitting Fair +   Dynamic Sitting Fair +   Static Standing Fair   Dynamic Standing Fair   Ambulatory Fair   Activity Tolerance   Activity Tolerance Patient tolerated treatment well   Nurse Made Aware RN clearance prior to session   RUE Assessment   RUE Assessment WFL   LUE Assessment   LUE Assessment WFL   Hand Function   Gross Motor Coordination Functional   Fine Motor Coordination Functional   Cognition   Overall Cognitive Status WFL   Arousal/Participation Alert;Responsive;Cooperative   Attention Within functional limits   Orientation Level Oriented X4   Memory Within functional limits   Following Commands Follows all commands and directions without difficulty   Comments Pt identified by name and . Pt was pleasant, cooperative, and willing to participate in OT evaluation.   Assessment   Assessment Pt is a 64 y.o. male seen for OT evaluation s/p admission to [facility] on 2024 due to abdominal pain. Pt diagnosed with bowel obstruction. Pt has a significant PMH impacting occupational performance including: Alcohol abuse, Anxiety, Asthma, Cardiac disease, Cervical stenosis (uterine cervix), Chest pain, Chronic kidney  disease, Colitis, Colon polyps, COPD (chronic obstructive pulmonary disease) (HCC), Coronary artery disease, Diverticulitis, Esophageal reflux, 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HCC), Heart failure, systolic, due to idiopathic cardiomyopathy (HCC), Hyperlipidemia, Hypertension, IBD (inflammatory bowel disease), Myocardial infarction (HCC), Old myocardial infarction, Paroxysmal atrial fibrillation (HCC), Perforation of colon (HCC), Pericarditis, Type 2 diabetes, diet controlled (HCC), Ulcerative colitis (HCC), and VT (ventricular tachycardia) (HCC). Pt with active OT evaluation and treatment orders and activity orders. PTA, pt living with his daughter and three, adult grandchildren in a 2 Sh w/ 1 KAREN and FFSU. Pt reports I w/ ADLs, IADLs, and fxnl mobility w/ rollator (within the community) at baseline; + driving. Pt agreeable and willing to participate in OT evaluation. During evaluation, pt was I for eating, mod I for grooming and UB ADLs, and S for LB ADLs and toileting. Pt was also mod I for bed mobility and transfers and S for fxnl mobility w/ RW for support. Pt performing ADLs at/near baseline level of independence and reports having good social support upon return home. No further acute OT needs identified at this time. Recommend continued active ADL participation and mobilization with hospital staff while in the hospital to increase pt’s endurance and strength upon D/C. From OT standpoint, recommend D/C to home with family support when medically cleared. D/C pt from OT caseload at this time.   Goals   Patient Goals to go home   Plan   OT Frequency Eval only   Discharge Recommendation   Rehab Resource Intensity Level, OT No post-acute rehabilitation needs   AM-PAC Daily Activity Inpatient   Lower Body Dressing 3   Bathing 3   Toileting 3   Upper Body Dressing 4   Grooming 4   Eating 4   Daily Activity Raw Score 21   Daily Activity Standardized Score (Calc for Raw Score >=11) 44.27   AM-PAC Applied Cognition Inpatient    Following a Speech/Presentation 4   Understanding Ordinary Conversation 4   Taking Medications 4   Remembering Where Things Are Placed or Put Away 4   Remembering List of 4-5 Errands 4   Taking Care of Complicated Tasks 4   Applied Cognition Raw Score 24   Applied Cognition Standardized Score 62.21       The patient's raw score on the AM-PAC Daily Activity Inpatient Short Form is 21. A raw score of greater than or equal to 19 suggests the patient may benefit from discharge to home. Please refer to the recommendation of the Occupational Therapist for safe discharge planning.    FRANKI Tamayo, OTR/L

## 2024-11-04 NOTE — PLAN OF CARE
Problem: PAIN - ADULT  Goal: Verbalizes/displays adequate comfort level or baseline comfort level  Description: Interventions:  - Encourage patient to monitor pain and request assistance  - Assess pain using appropriate pain scale  - Administer analgesics based on type and severity of pain and evaluate response  - Implement non-pharmacological measures as appropriate and evaluate response  - Consider cultural and social influences on pain and pain management  - Notify physician/advanced practitioner if interventions unsuccessful or patient reports new pain  Outcome: Progressing     Problem: INFECTION - ADULT  Goal: Absence or prevention of progression during hospitalization  Description: INTERVENTIONS:  - Assess and monitor for signs and symptoms of infection  - Monitor lab/diagnostic results  - Monitor all insertion sites, i.e. indwelling lines, tubes, and drains  - Monitor endotracheal if appropriate and nasal secretions for changes in amount and color  - [place] appropriate cooling/warming therapies per order  - Administer medications as ordered  - Instruct and encourage patient and family to use good hand hygiene technique  - Identify and instruct in appropriate isolation precautions for identified infection/condition  Outcome: Progressing  Goal: Absence of fever/infection during neutropenic period  Description: INTERVENTIONS:  - Monitor WBC    Outcome: Progressing     Problem: SAFETY ADULT  Goal: Patient will remain free of falls  Description: INTERVENTIONS:  - Educate patient/family on patient safety including physical limitations  - Instruct patient to call for assistance with activity   - Consult OT/PT to assist with strengthening/mobility   - Keep Call bell within reach  - Keep bed low and locked with side rails adjusted as appropriate  - Keep care items and personal belongings within reach  - Initiate and maintain comfort rounds  - Make Fall Risk Sign visible to staff  - Offer Toileting every 2 Hours,  in advance of need  - Initiate/Maintain bed alarm  - Obtain necessary fall risk management equipment  - Apply yellow socks and bracelet for high fall risk patients  - Consider moving patient to room near nurses station  Outcome: Progressing  Goal: Maintain or return to baseline ADL function  Description: INTERVENTIONS:  -  Assess patient's ability to carry out ADLs; assess patient's baseline for ADL function and identify physical deficits which impact ability to perform ADLs (bathing, care of mouth/teeth, toileting, grooming, dressing, etc.)  - Assess/evaluate cause of self-care deficits   - Assess range of motion  - Assess patient's mobility; develop plan if impaired  - Assess patient's need for assistive devices and provide as appropriate  - Encourage maximum independence but intervene and supervise when necessary  - Involve family in performance of ADLs  - Assess for home care needs following discharge   - Consider OT consult to assist with ADL evaluation and planning for discharge  - Provide patient education as appropriate  Outcome: Progressing  Goal: Maintains/Returns to pre admission functional level  Description: INTERVENTIONS:  - Perform AM-PAC 6 Click Basic Mobility/ Daily Activity assessment daily.  - Set and communicate daily mobility goal to care team and patient/family/caregiver.   - Collaborate with rehabilitation services on mobility goals if consulted  - Out of bed for toileting  - Record patient progress and toleration of activity level   Outcome: Progressing     Problem: DISCHARGE PLANNING  Goal: Discharge to home or other facility with appropriate resources  Description: INTERVENTIONS:  - Identify barriers to discharge w/patient and caregiver  - Arrange for needed discharge resources and transportation as appropriate  - Identify discharge learning needs (meds, wound care, etc.)  - Arrange for interpretive services to assist at discharge as needed  - Refer to Case Management Department for  coordinating discharge planning if the patient needs post-hospital services based on physician/advanced practitioner order or complex needs related to functional status, cognitive ability, or social support system  Outcome: Progressing     Problem: Knowledge Deficit  Goal: Patient/family/caregiver demonstrates understanding of disease process, treatment plan, medications, and discharge instructions  Description: Complete learning assessment and assess knowledge base.  Interventions:  - Provide teaching at level of understanding  - Provide teaching via preferred learning methods  Outcome: Progressing     Problem: Nutrition/Hydration-ADULT  Goal: Nutrient/Hydration intake appropriate for improving, restoring or maintaining nutritional needs  Description: Monitor and assess patient's nutrition/hydration status for malnutrition. Collaborate with interdisciplinary team and initiate plan and interventions as ordered.  Monitor patient's weight and dietary intake as ordered or per policy. Utilize nutrition screening tool and intervene as necessary. Determine patient's food preferences and provide high-protein, high-caloric foods as appropriate.     INTERVENTIONS:  - Monitor oral intake, urinary output, labs, and treatment plans  - Assess nutrition and hydration status and recommend course of action  - Evaluate amount of meals eaten  - Assist patient with eating if necessary   - Allow adequate time for meals  - Recommend/ encourage appropriate diets, oral nutritional supplements, and vitamin/mineral supplements  - Order, calculate, and assess calorie counts as needed  - Recommend, monitor, and adjust tube feedings and TPN/PPN based on assessed needs  - Assess need for intravenous fluids  - Provide specific nutrition/hydration education as appropriate  - Include patient/family/caregiver in decisions related to nutrition  Outcome: Progressing

## 2024-11-05 ENCOUNTER — TELEPHONE (OUTPATIENT)
Age: 64
End: 2024-11-05

## 2024-11-05 VITALS
WEIGHT: 178.35 LBS | TEMPERATURE: 98 F | DIASTOLIC BLOOD PRESSURE: 68 MMHG | HEART RATE: 78 BPM | SYSTOLIC BLOOD PRESSURE: 120 MMHG | BODY MASS INDEX: 28.66 KG/M2 | RESPIRATION RATE: 16 BRPM | OXYGEN SATURATION: 99 % | HEIGHT: 66 IN

## 2024-11-05 LAB
GLUCOSE SERPL-MCNC: 123 MG/DL (ref 65–140)
GLUCOSE SERPL-MCNC: 139 MG/DL (ref 65–140)

## 2024-11-05 PROCEDURE — 94664 DEMO&/EVAL PT USE INHALER: CPT

## 2024-11-05 PROCEDURE — 94640 AIRWAY INHALATION TREATMENT: CPT

## 2024-11-05 PROCEDURE — 82948 REAGENT STRIP/BLOOD GLUCOSE: CPT

## 2024-11-05 PROCEDURE — 99238 HOSP IP/OBS DSCHRG MGMT 30/<: CPT

## 2024-11-05 PROCEDURE — NC001 PR NO CHARGE: Performed by: SURGERY

## 2024-11-05 PROCEDURE — 94760 N-INVAS EAR/PLS OXIMETRY 1: CPT

## 2024-11-05 RX ORDER — CEPHALEXIN 500 MG/1
500 CAPSULE ORAL EVERY 6 HOURS SCHEDULED
Qty: 8 CAPSULE | Refills: 0 | Status: SHIPPED | OUTPATIENT
Start: 2024-11-05 | End: 2024-11-08

## 2024-11-05 RX ADMIN — HYDROMORPHONE HYDROCHLORIDE 0.2 MG: 0.2 INJECTION, SOLUTION INTRAMUSCULAR; INTRAVENOUS; SUBCUTANEOUS at 09:38

## 2024-11-05 RX ADMIN — NICOTINE 1 PATCH: 21 PATCH, EXTENDED RELEASE TRANSDERMAL at 09:38

## 2024-11-05 RX ADMIN — CHLORHEXIDINE GLUCONATE 0.12% ORAL RINSE 15 ML: 1.2 LIQUID ORAL at 09:38

## 2024-11-05 RX ADMIN — FLUTICASONE FUROATE AND VILANTEROL TRIFENATATE 1 PUFF: 200; 25 POWDER RESPIRATORY (INHALATION) at 09:38

## 2024-11-05 RX ADMIN — IPRATROPIUM BROMIDE 0.5 MG: 0.5 SOLUTION RESPIRATORY (INHALATION) at 13:45

## 2024-11-05 RX ADMIN — LEVALBUTEROL HYDROCHLORIDE 1.25 MG: 1.25 SOLUTION RESPIRATORY (INHALATION) at 07:00

## 2024-11-05 RX ADMIN — IPRATROPIUM BROMIDE 0.5 MG: 0.5 SOLUTION RESPIRATORY (INHALATION) at 07:00

## 2024-11-05 RX ADMIN — PANTOPRAZOLE SODIUM 40 MG: 40 INJECTION, POWDER, FOR SOLUTION INTRAVENOUS at 09:39

## 2024-11-05 RX ADMIN — LEVALBUTEROL HYDROCHLORIDE 1.25 MG: 1.25 SOLUTION RESPIRATORY (INHALATION) at 13:45

## 2024-11-05 RX ADMIN — ENOXAPARIN SODIUM 40 MG: 40 INJECTION SUBCUTANEOUS at 09:38

## 2024-11-05 RX ADMIN — UMECLIDINIUM 1 PUFF: 62.5 AEROSOL, POWDER ORAL at 09:38

## 2024-11-05 NOTE — TELEPHONE ENCOUNTER
Pt just called he is currently in the hospital and not going to be able to make his appt today at 3:30pm. Offered to reschedule but he states at the moment no, who like to recover, asks for cb

## 2024-11-05 NOTE — DISCHARGE INSTR - AVS FIRST PAGE
Acute Care Surgery Discharge Instructions    Please follow-up with the surgery clinic on an as needed basis. Please follow up with PCP in 1-2 weeks to review hospitalization and incidental findings.    Activity:  - You may resume activity as tolerated.    Return to work:    - You are clear to return to work on discharge.    Diet:    - You may resume your normal diet.    Medications:  - You should continue your current medication regimenafter discharge unless otherwise instructed. Please refer to your discharge medication list for further details.  - Please take the pain medications as directed.  - You are encouraged to use non-narcotic pain medications first and whenever possible. Reserve the use of narcotic pain medication for moderate to severe pain not controlled by non-narcotic medications.  - Please take Keflex as prescribed on discharge for treatment of UTI    Additional Instructions:  - May shower daily and/or bathe normally.  - If you have any questions or concerns after discharge please call the office.  - Call office or return to ER if fever greater than 101, chills, persistent nausea/vomiting, and/or worsening/uncontrollable pain.

## 2024-11-05 NOTE — INCIDENTAL FINDINGS
The following findings require follow up:  Radiographic finding   Finding:     LOWER CHEST: Branching reticular nodular opacities in the left and right lower lobe with few dilated bronchi, 4 mm nodule right lower lobe 301/10 unchanged with small nodular density related to an impacted bronchus right lower lobe 301/18.  Coronary calcifications.    GALLBLADDER: Cholelithiasis without findings of acute cholecystitis.     KIDNEYS/URETERS: Left upper pole renal cyst with wall calcification unchanged. Other smaller bilateral renal cysts including cyst with wall calcification in the lower pole of the right kidney no hydronephrosis.     ABDOMINAL WALL/INGUINAL REGIONS: Ventral hernia containing portion of transverse colon.      Follow up required: PCP   Follow up should be done within 1-2 week(s)    Please notify the following clinician to assist with the follow up:   Dr. Mariola Doyle MD    Incidental finding results were discussed with the Patient by Pooja Zapien PA-C on 11/05/24.   They expressed understanding and all questions answered. Patient instructed to follow up with PCP to review hospitalization and incidental findings.     Pooja Zapien PA-C

## 2024-11-05 NOTE — PLAN OF CARE
Problem: PAIN - ADULT  Goal: Verbalizes/displays adequate comfort level or baseline comfort level  Description: Interventions:  - Encourage patient to monitor pain and request assistance  - Assess pain using appropriate pain scale  - Administer analgesics based on type and severity of pain and evaluate response  - Implement non-pharmacological measures as appropriate and evaluate response  - Consider cultural and social influences on pain and pain management  - Notify physician/advanced practitioner if interventions unsuccessful or patient reports new pain  Outcome: Progressing     Problem: INFECTION - ADULT  Goal: Absence or prevention of progression during hospitalization  Description: INTERVENTIONS:  - Assess and monitor for signs and symptoms of infection  - Monitor lab/diagnostic results  - Monitor all insertion sites, i.e. indwelling lines, tubes, and drains  - Monitor endotracheal if appropriate and nasal secretions for changes in amount and color  - Perry appropriate cooling/warming therapies per order  - Administer medications as ordered  - Instruct and encourage patient and family to use good hand hygiene technique  - Identify and instruct in appropriate isolation precautions for identified infection/condition  Outcome: Progressing

## 2024-11-05 NOTE — PROGRESS NOTES
Progress Note - Surgery-General   Name: Mathew Bird 64 y.o. male I MRN: [id number]  Unit/Bed#: [facility] 932-01 I Date of Admission: 11/1/2024   Date of Service: 11/5/2024 I Hospital Day: 4    Assessment & Plan  Bowel obstruction (HCC)  64-year-old man with Hx of Crohn's s/p ileal colectomy with anastomosis and  ileostomy which as been reversed, here with concerns for closed-loop obstruction and UTI.     11/3 KUB shows contrast in rectum. NGT removed and started on clear liquid diet.      Plan  -Regular diet  -Restart home meds as appropriate  -Continue abx for UTI, Rocephin; 5 day course  -Pain/nausea control  -Replete electrolytes PRN  -OOB/ambulation  -plan for d/c home today    Please contact the SecureChat role for the Surgery-General service with any questions/concerns.    Subjective   Patient seen at bedside, not in acute distress.  No nausea/vomiting. Tolerating diet and having bowel function  Objective :  Temp:  [98 °F (36.7 °C)-98.3 °F (36.8 °C)] 98 °F (36.7 °C)  HR:  [78-82] 78  BP: (109-124)/(62-68) 120/68  Resp:  [14-18] 16  SpO2:  [94 %-97 %] 97 %  O2 Device: Nasal cannula  Nasal Cannula O2 Flow Rate (L/min):  [4 L/min] 4 L/min    I/O         11/01 0701  11/02 0700 11/02 0701  11/03 0700 11/03 0701  11/04 0700    P.O.   0    I.V. (mL/kg)  1212 (15)     IV Piggyback   50    Total Intake(mL/kg)  1212 (15) 50 (0.6)    Urine (mL/kg/hr) 450 200 (0.1) 0 (0)    Emesis/NG output 400 1250 100    Stool  0     Total Output 850 1450 100    Net -850 -238 -50           Unmeasured Urine Occurrence  401 x 1 x    Unmeasured Stool Occurrence  2 x             Physical Exam  Vitals and nursing note reviewed.   Constitutional:       General: He is not in acute distress.  HENT:      Head: Normocephalic and atraumatic.   Cardiovascular:      Rate and Rhythm: Normal rate.   Pulmonary:      Effort: Pulmonary effort is normal. No respiratory distress.   Abdominal:      Comments: Soft, nontender, nondistended, midline incisional  hernia stable with no skin changes     Neurological:      Mental Status: He is alert.           Lab Results: I have reviewed the following results:  Recent Labs     11/02/24 1955 11/03/24  0627 11/04/24  0437   WBC  --  8.47 7.11   HGB  --  11.3* 10.4*   HCT  --  37.2 34.3*   PLT  --  178 175   SODIUM  --  143 143   K  --  4.2 3.7   CL  --  103 102   CO2  --  33* 30   BUN  --  21 13   CREATININE  --  1.28 1.03   GLUC  --  86 71   CAIONIZED  --  1.06*  --    MG  --  2.5  --    PHOS  --  3.7  --    LACTICACID 2.0  --   --        Imaging Results Review: No pertinent imaging studies reviewed.  Other Study Results Review: No additional pertinent studies reviewed.    VTE Pharmacologic Prophylaxis: VTE covered by:  enoxaparin, Subcutaneous, 40 mg at 11/05/24 0938      VTE Mechanical Prophylaxis: sequential compression device

## 2024-11-05 NOTE — DISCHARGE SUMMARY
"Discharge Summary - Surgery-General   Name: Mathew Bird 64 y.o. male I MRN: [id number]  [facility] I Date of Admission: 11/1/2024   Date of Service: 11/5/2024 I Hospital Day: 4    Admission Date: 11/1/2024 1627  Discharge Date: 11/05/24  Admitting Diagnosis: Bowel obstruction (HCC) [K56.609]  Abdominal pain [R10.9]  Partial small bowel obstruction (HCC) [K56.600]  Intestinal obstruction, unspecified cause, unspecified whether partial or complete (HCC) [K56.609]  Discharge Diagnosis: SBO  Medical Problems       Resolved Problems  Date Reviewed: 11/5/2024   None         HPI: As per resident Dr. Aguiar \"Mathew Bird is a 64 y.o. male who presents with abdominal pain. Intermittent abdominal pain starting this morning.  Is the first time patient experienced this.  No nausea or vomiting, last bowel movement 1 hour prior to arrival.  No fevers or chills.  Patient reports some dizziness and lightheadedness today.  Patient is tolerating liquids last meal was a few cupcakes this morning.  Hx of Crohn's s/p ileal colectomy with anastomosis and  ileostomy which as been reversed, CHF, CAD s/p stents, COPD, CKD, T2DM, Afib.  Aspirin, no anticoagulation.  Allergies to latex and medical tape.\"    Procedures Performed: No orders of the defined types were placed in this encounter.      Summary of Hospital Course:   Patient is a 64 year M who presented to the ED 11/1 with abdominal pain. CTAP significant for concerns for high grade small bowel obstruction with associated stranding of the mesentery and small amount of fluid. Patient was admitted under the general surgery service for further evaluation and treatment. On admission, patient was admitted to ICU. Patient continued to have serial abdominal exams. Patient had an NGT placed. Due to patients lung function it was determined to proceed with conservative management of SBO. Patient had a KUB done on 11/3 which showed resolution of SBO. Patients NGT was " removed and patient was started on a CLD. Patients diet was advanced as tolerated throughout admission. Patient was on antibiotics due to UTI.     By date of discharge on 11/5/2024, patient was deemed appropriate for discharge. Patient was tolerating a diet. Patients pain was well controlled. Patient was ambulatory. Patient had bowel function. Patient was voiding spontaneously. Patient was discharged into the care of his family on 11/5/2024.     Patient was instructed to follow up with PCP to review hospitalization and incidental findings. Patient was instructed to take Keflex as prescribed on discharge for treatment of UTI.     For further information regarding this hospitalization please refer to the medical records.    Significant Findings, Care, Treatment and Services Provided: XR abdomen 1 view kub    Result Date: 11/3/2024  Impression: Air-filled, dilated small bowel loops with air-fluid levels suspicious for small bowel obstruction. Satisfactory positioning of NG tube. Resident: Hi Restrepo I, the attending radiologist, have reviewed the images and agree with the final report above. Workstation performed: [id number]     XR abdomen 1 view kub    Result Date: 11/3/2024  Impression: Resolved small bowel obstruction. Resident: Hi Restrepo I, the attending radiologist, have reviewed the images and agree with the final report above. Workstation performed: [id number]     XR chest portable    Result Date: 11/2/2024  Impression: Feeding tube tip terminating in the stomach. Workstation performed: [id number]     CT abdomen pelvis with contrast    Result Date: 11/1/2024  Impression: 1. Focally dilated small bowel loop in the mid and right abdomen, suggesting a high-grade obstruction which could be related to obstruction of the proximal and distal segments due to adhesions versus closed-loop obstruction. There is associated stranding  of the mesentery and small amount of fluid. No small bowel pneumatosis. 2. Stable  findings consistent with bronchiolitis with right lower lobe lung nodule and impacted bronchus in the right lower lobe. Follow-up evaluation was recommended at the time of CT chest in September 2024. I personally discussed this study with Alonsoryan Garzon on 11/1/2024 9:35 PM. Workstation performed: [id number]       Complications: None    Condition at Discharge: good       Discharge instructions/Information to patient and family:   See After Visit Summary (AVS) for information provided to patient and family.      Provisions for Follow-Up Care:  See after visit summary for information related to follow-up care and any pertinent home health orders.      PCP: Mariola Doyle MD    Disposition: Home    Planned Readmission: No     Discharge Medications:  See after visit summary for reconciled discharge medications provided to patient and family.      Discharge Statement:  I have spent a total time of 20 minutes in caring for this patient on the day of the visit/encounter.     Pooja Zapien PA-C

## 2024-11-05 NOTE — ASSESSMENT & PLAN NOTE
64-year-old man with Hx of Crohn's s/p ileal colectomy with anastomosis and  ileostomy which as been reversed, here with concerns for closed-loop obstruction and UTI.     11/3 KUB shows contrast in rectum. NGT removed and started on clear liquid diet.      Plan  -Regular diet  -Restart home meds as appropriate  -Continue abx for UTI, Rocephin; 5 day course  -Pain/nausea control  -Replete electrolytes PRN  -OOB/ambulation  -plan for d/c home today

## 2024-11-06 ENCOUNTER — TRANSITIONAL CARE MANAGEMENT (OUTPATIENT)
Dept: FAMILY MEDICINE CLINIC | Facility: CLINIC | Age: 64
End: 2024-11-06

## 2024-11-06 ENCOUNTER — TELEPHONE (OUTPATIENT)
Dept: FAMILY MEDICINE CLINIC | Facility: CLINIC | Age: 64
End: 2024-11-06

## 2024-11-06 DIAGNOSIS — F17.210 CIGARETTE NICOTINE DEPENDENCE WITHOUT COMPLICATION: ICD-10-CM

## 2024-11-06 DIAGNOSIS — F17.200 NICOTINE DEPENDENCE, UNCOMPLICATED, UNSPECIFIED NICOTINE PRODUCT TYPE: Primary | ICD-10-CM

## 2024-11-06 RX ORDER — VARENICLINE TARTRATE 0.5 (11)-1
KIT ORAL
Qty: 53 EACH | Refills: 0 | Status: SHIPPED | OUTPATIENT
Start: 2024-11-06

## 2024-11-06 RX ORDER — NICOTINE 21 MG/24HR
1 PATCH, TRANSDERMAL 24 HOURS TRANSDERMAL DAILY
Qty: 28 PATCH | Refills: 0 | Status: SHIPPED | OUTPATIENT
Start: 2024-11-06

## 2024-11-06 NOTE — UTILIZATION REVIEW
NOTIFICATION OF ADMISSION DISCHARGE   This is a Notification of Discharge from [facility]. Please be advised that this patient has been discharge from our facility. Below you will find the admission and discharge date and time including the patient’s disposition.   UTILIZATION REVIEW CONTACT:  Srinivasan Jimenez  Utilization   Network Utilization Review Department  Phone: [phone] x carefully listen to the prompts. All voicemails are confidential.  Email: [email or web address]     ADMISSION INFORMATION  PRESENTATION DATE: 11/1/2024  4:27 PM  OBERVATION ADMISSION DATE: N/A  INPATIENT ADMISSION DATE: 11/1/24 11:58 PM   DISCHARGE DATE: 11/5/2024  3:41 PM   DISPOSITION:Home/Self Care    Network Utilization Review Department  ATTENTION: Please call with any questions or concerns to [phone] and carefully listen to the prompts so that you are directed to the right person. All voicemails are confidential.   For Discharge needs, contact Care Management DC Support Team at [phone] opt. 2  Send all requests for admission clinical reviews, approved or denied determinations and any other requests to dedicated fax number below belonging to the campus where the patient is receiving treatment. List of dedicated fax numbers for the Facilities:  FACILITY NAME UR FAX NUMBER   ADMISSION DENIALS (Administrative/Medical Necessity) [phone]   DISCHARGE SUPPORT TEAM ([facility]) [phone]   PARENT CHILD HEALTH (Maternity/NICU/Pediatrics) [phone]   [facility] [phone]   [facility] [phone]   [facility] [phone]   [facility] [phone]   [facility] [phone]   [facility] [phone]   [facility] [phone]   [facility] [phone]   [facility]  [facility] [phone]   [facility] [phone]   [facility] [phone]   [facility] [phone]

## 2024-11-06 NOTE — TELEPHONE ENCOUNTER
Pt informed. States he already has 2 boxes of nicotine patches. Asked if there is anything else he can take? Please advise

## 2024-11-06 NOTE — TELEPHONE ENCOUNTER
Pt has TCM scheduled Friday. When he went into hospital, they started him on a patch to stop smoking. Pt would like to continue on the patch, he does not want to smoke anymore but he is having a bit of a tough time, says he is fidgety. Asked if there is a med he can take to help w/ nicotine withdrawal. Please advise

## 2024-11-07 LAB
BACTERIA BLD CULT: NORMAL
BACTERIA BLD CULT: NORMAL

## 2024-11-08 ENCOUNTER — OFFICE VISIT (OUTPATIENT)
Dept: FAMILY MEDICINE CLINIC | Facility: CLINIC | Age: 64
End: 2024-11-08
Payer: COMMERCIAL

## 2024-11-08 VITALS
SYSTOLIC BLOOD PRESSURE: 120 MMHG | TEMPERATURE: 98.4 F | BODY MASS INDEX: 28.93 KG/M2 | OXYGEN SATURATION: 98 % | RESPIRATION RATE: 17 BRPM | DIASTOLIC BLOOD PRESSURE: 70 MMHG | HEIGHT: 66 IN | HEART RATE: 115 BPM | WEIGHT: 180 LBS

## 2024-11-08 DIAGNOSIS — Z09 HOSPITAL DISCHARGE FOLLOW-UP: Primary | ICD-10-CM

## 2024-11-08 DIAGNOSIS — Z23 NEED FOR COVID-19 VACCINE: ICD-10-CM

## 2024-11-08 DIAGNOSIS — E11.65 TYPE 2 DIABETES MELLITUS WITH HYPERGLYCEMIA, WITHOUT LONG-TERM CURRENT USE OF INSULIN (HCC): ICD-10-CM

## 2024-11-08 DIAGNOSIS — H35.3190 AGE-RELATED MACULAR DEGENERATION WITH CENTRAL GEOGRAPHIC ATROPHY: Primary | ICD-10-CM

## 2024-11-08 DIAGNOSIS — E11.9 TYPE 2 DIABETES MELLITUS WITHOUT COMPLICATION, WITHOUT LONG-TERM CURRENT USE OF INSULIN (HCC): ICD-10-CM

## 2024-11-08 DIAGNOSIS — L84 FOOT CALLUS: ICD-10-CM

## 2024-11-08 DIAGNOSIS — K50.918 CROHN'S DISEASE WITH OTHER COMPLICATION, UNSPECIFIED GASTROINTESTINAL TRACT LOCATION (HCC): ICD-10-CM

## 2024-11-08 DIAGNOSIS — J44.9 STAGE 4 VERY SEVERE COPD BY GOLD CLASSIFICATION (HCC): ICD-10-CM

## 2024-11-08 DIAGNOSIS — K56.50 INTESTINAL ADHESIONS WITH OBSTRUCTION, UNSPECIFIED WHETHER PARTIAL OR COMPLETE (HCC): ICD-10-CM

## 2024-11-08 DIAGNOSIS — J96.11 CHRONIC RESPIRATORY FAILURE WITH HYPOXIA (HCC): ICD-10-CM

## 2024-11-08 PROBLEM — R79.89 ELEVATED TROPONIN: Status: RESOLVED | Noted: 2018-05-15 | Resolved: 2024-11-08

## 2024-11-08 PROBLEM — N17.9 AKI (ACUTE KIDNEY INJURY) (HCC): Status: RESOLVED | Noted: 2018-07-08 | Resolved: 2024-11-08

## 2024-11-08 LAB
LEFT EYE DIABETIC RETINOPATHY: ABNORMAL
LEFT EYE IMAGE QUALITY: ABNORMAL
LEFT EYE MACULAR EDEMA: ABNORMAL
LEFT EYE OTHER RETINOPATHY: ABNORMAL
RIGHT EYE DIABETIC RETINOPATHY: ABNORMAL
RIGHT EYE IMAGE QUALITY: ABNORMAL
RIGHT EYE MACULAR EDEMA: ABNORMAL
RIGHT EYE OTHER RETINOPATHY: ABNORMAL
SEVERITY (EYE EXAM): ABNORMAL

## 2024-11-08 PROCEDURE — 90480 ADMN SARSCOV2 VAC 1/ONLY CMP: CPT

## 2024-11-08 PROCEDURE — 91320 SARSCV2 VAC 30MCG TRS-SUC IM: CPT

## 2024-11-08 PROCEDURE — 99496 TRANSJ CARE MGMT HIGH F2F 7D: CPT | Performed by: FAMILY MEDICINE

## 2024-11-08 PROCEDURE — 92250 FUNDUS PHOTOGRAPHY W/I&R: CPT | Performed by: FAMILY MEDICINE

## 2024-11-08 RX ORDER — SULFASALAZINE 500 MG/1
500 TABLET ORAL 4 TIMES DAILY
Qty: 360 TABLET | Refills: 0 | Status: SHIPPED | OUTPATIENT
Start: 2024-11-08 | End: 2025-02-06

## 2024-11-08 NOTE — ASSESSMENT & PLAN NOTE
Never took sulfasalazine as prescribed   Sent over new script today to start   Seeing GI next month     Orders:    sulfaSALAzine (AZULFIDINE) 500 mg tablet; Take 1 tablet (500 mg total) by mouth 4 (four) times a day

## 2024-11-08 NOTE — PROGRESS NOTES
Transition of Care Visit  Name: Mathew Bird      : 1960      MRN: [id number]  Encounter Provider: Mariola Doyle MD  Encounter Date: 2024   Encounter department: [facility]    Assessment & Plan  Hospital discharge follow-up         Intestinal adhesions with obstruction, unspecified whether partial or complete (HCC)  Hx of Crohn's s/p ileal colectomy with anastomosis and ileostomy   Treated for UTI with Rocephin IV in the hospital and keflex as outpatient            Crohn's disease with other complication, unspecified gastrointestinal tract location (HCC)  Never took sulfasalazine as prescribed   Sent over new script today to start   Seeing GI next month     Orders:    sulfaSALAzine (AZULFIDINE) 500 mg tablet; Take 1 tablet (500 mg total) by mouth 4 (four) times a day    Type 2 diabetes mellitus without complication, without long-term current use of insulin (HCC)    Lab Results   Component Value Date    HGBA1C 7.4 (H) 2024   On metformin   Recheck in 3 months   Orders:    CBC and differential    Comprehensive metabolic panel    Lipid panel    Hemoglobin A1C    Albumin / creatinine urine ratio; Future    Foot callus    Orders:    Ambulatory Referral to Podiatry; Future    Stage 4 very severe COPD by GOLD classification ([facility])  Stable   Continue care per pulmonary         Chronic respiratory failure with hypoxia (HCC)  On 4 L oxygen         Type 2 diabetes mellitus with hyperglycemia, without long-term current use of insulin ([facility])    Lab Results   Component Value Date    HGBA1C 7.4 (H) 2024   On metformin   Recheck in 3 months   Orders:    IRIS Diabetic eye exam    Need for COVID-19 vaccine    Orders:    COVID-19 Pfizer mRNA vaccine 12 yr and older (Comirnaty pre-filled syringe)         History of Present Illness     Transitional Care Management Review:   Mathew Bird is a 64 y.o. male here for TCM follow up.     During the TCM phone call patient stated:  TCM Call        Date and time call was made  11/6/2024  9:27 AM    Hospital care reviewed  Records reviewed    Patient was hospitialized at  [facility]    Date of Admission  11/01/24    Date of discharge  11/05/24    Diagnosis  Bowel obstruction    Disposition  Home    Were the patients medications reviewed and updated  No    Current Symptoms  None          TCM Call       Post hospital issues  None    Should patient be enrolled in anticoag monitoring?  Yes    Scheduled for follow up?  Yes    Did you obtain your prescribed medications  Yes    Do you need help managing your prescriptions or medications  No    Is transportation to your appointment needed  No    I have advised the patient to call PCP with any new or worsening symptoms  Olivia Gray,     Living Arrangements  Children    Are you recieving any outpatient services  No    Are you recieving home care services  No    Are you using any community resources  No    Current waiver services  No    Have you fallen in the last 12 months  No    Interperter language line needed  No    Counseling  Patient    Counseling topics  instructions for management; Importance of RX compliance          HPI  Here for hospital follow up  Was admitted for bowel obstruction due to adhesion vs closed loop obstruction on 11/1/2024  Treated with NGT for 36 hours and repeat KUB showed resolution of SBO on 11/3/2024  Also found to have UTI which he was treated with Rocephin IV and sent home with keflex   Feels well today   Denies pain/fever/chills      Review of Systems   Constitutional: Negative.    HENT: Negative.     Eyes: Negative.    Respiratory: Negative.     Gastrointestinal:  Positive for abdominal pain.   Endocrine: Negative.    Genitourinary: Negative.    Musculoskeletal: Negative.    Allergic/Immunologic: Negative.    Neurological: Negative.    Hematological: Negative.    Psychiatric/Behavioral: Negative.       Objective     /70 (BP Location: Left arm, Patient Position:  "Sitting, Cuff Size: Standard)   Pulse (!) 115   Temp 98.4 °F (36.9 °C) (Tympanic)   Resp 17   Ht 5' 6\" (1.676 m)   Wt 81.6 kg (180 lb)   SpO2 98%   BMI 29.05 kg/m²     Physical Exam  Vitals and nursing note reviewed.   Constitutional:       Appearance: Normal appearance.   HENT:      Mouth/Throat:      Mouth: Mucous membranes are moist.   Cardiovascular:      Rate and Rhythm: Normal rate and regular rhythm.      Pulses: Normal pulses. no weak pulses.           Dorsalis pedis pulses are 2+ on the right side and 2+ on the left side.        Posterior tibial pulses are 2+ on the right side and 2+ on the left side.      Heart sounds: Normal heart sounds.   Pulmonary:      Effort: Pulmonary effort is normal.      Breath sounds: Normal breath sounds.   Feet:      Right foot:      Skin integrity: Callus and dry skin present. No ulcer, skin breakdown, erythema or warmth.      Left foot:      Skin integrity: Callus and dry skin present. No ulcer, skin breakdown, erythema or warmth.   Neurological:      General: No focal deficit present.      Mental Status: He is alert and oriented to person, place, and time.   Psychiatric:         Mood and Affect: Mood normal.         Behavior: Behavior normal.     Patient's shoes and socks removed.    Right Foot/Ankle   Right Foot Inspection  Skin Exam: skin normal, skin intact, dry skin, callus and callus. No warmth, no erythema, no maceration, no abnormal color, no pre-ulcer and no ulcer.     Toe Exam: ROM and strength within normal limits.     Sensory   Vibration: intact  Proprioception: intact  Monofilament testing: intact    Vascular  Capillary refills: < 3 seconds  The right DP pulse is 2+. The right PT pulse is 2+.     Left Foot/Ankle  Left Foot Inspection  Skin Exam: skin normal, skin intact, dry skin and callus. No warmth, no erythema, no maceration, normal color, no pre-ulcer and no ulcer.     Toe Exam: ROM and strength within normal limits.     Sensory   Vibration: " intact  Proprioception: intact  Monofilament testing: intact    Vascular  Capillary refills: < 3 seconds  The left DP pulse is 2+. The left PT pulse is 2+.     Assign Risk Category  No deformity present  No loss of protective sensation  No weak pulses  Risk: 0   + onycomycosis all toes bilaterally     Medications have been reviewed by provider in current encounter

## 2024-11-08 NOTE — ASSESSMENT & PLAN NOTE
Hx of Crohn's s/p ileal colectomy with anastomosis and ileostomy   Treated for UTI with Rocephin IV in the hospital and keflex as outpatient

## 2024-11-08 NOTE — ASSESSMENT & PLAN NOTE
Lab Results   Component Value Date    HGBA1C 7.4 (H) 09/03/2024   On metformin   Recheck in 3 months   Orders:    IRIS Diabetic eye exam

## 2024-11-08 NOTE — ASSESSMENT & PLAN NOTE
Lab Results   Component Value Date    HGBA1C 7.4 (H) 09/03/2024   On metformin   Recheck in 3 months   Orders:    CBC and differential    Comprehensive metabolic panel    Lipid panel    Hemoglobin A1C    Albumin / creatinine urine ratio; Future

## 2024-11-11 ENCOUNTER — TELEPHONE (OUTPATIENT)
Dept: FAMILY MEDICINE CLINIC | Facility: CLINIC | Age: 64
End: 2024-11-11

## 2024-11-11 NOTE — TELEPHONE ENCOUNTER
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

## 2024-11-12 ENCOUNTER — TELEPHONE (OUTPATIENT)
Dept: PULMONOLOGY | Facility: CLINIC | Age: 64
End: 2024-11-12

## 2024-11-12 NOTE — TELEPHONE ENCOUNTER
Called patient to schedule the 4W HFU (around 10/28/2024) from the recall list  for Follow up COPD.CB for R/S// HFU around TBD discharge and left a voicemail message.as per Dr. King

## 2024-11-18 ENCOUNTER — RESULTS FOLLOW-UP (OUTPATIENT)
Dept: FAMILY MEDICINE CLINIC | Facility: CLINIC | Age: 64
End: 2024-11-18

## 2024-11-19 NOTE — TELEPHONE ENCOUNTER
Patient called back and is aware of results.      He is asking if the eye specialist will contact him for the appointment?    Please advise.

## 2024-11-22 DIAGNOSIS — J44.9 STAGE 4 VERY SEVERE COPD BY GOLD CLASSIFICATION (HCC): ICD-10-CM

## 2024-11-25 RX ORDER — IPRATROPIUM BROMIDE AND ALBUTEROL SULFATE 2.5; .5 MG/3ML; MG/3ML
SOLUTION RESPIRATORY (INHALATION)
Qty: 180 ML | Refills: 2 | Status: SHIPPED | OUTPATIENT
Start: 2024-11-25

## 2024-12-02 ENCOUNTER — TELEPHONE (OUTPATIENT)
Age: 64
End: 2024-12-02

## 2024-12-02 DIAGNOSIS — F17.200 NICOTINE DEPENDENCE, UNCOMPLICATED, UNSPECIFIED NICOTINE PRODUCT TYPE: Primary | ICD-10-CM

## 2024-12-02 DIAGNOSIS — F17.210 CIGARETTE NICOTINE DEPENDENCE WITHOUT COMPLICATION: ICD-10-CM

## 2024-12-02 RX ORDER — VARENICLINE TARTRATE 1 MG/1
1 TABLET, FILM COATED ORAL 2 TIMES DAILY
Qty: 60 TABLET | Refills: 1 | Status: SHIPPED | OUTPATIENT
Start: 2024-12-02

## 2024-12-02 RX ORDER — NICOTINE 21 MG/24HR
1 PATCH, TRANSDERMAL 24 HOURS TRANSDERMAL DAILY
Qty: 28 PATCH | Refills: 0 | Status: SHIPPED | OUTPATIENT
Start: 2024-12-02

## 2024-12-02 NOTE — TELEPHONE ENCOUNTER
Patient called that he has one more day left of the Varenicline Tartrate, Starter, (Chantix Starting Month Shay) 0.5 MG X 11 & 1 MG X 42 TBPK  he is not sure if there is a 2nd step or another pack he needs to do. Also needs a refill on the Nicotine patches as well. He has completed his first month of quitting smoking.     [id number] - [place], [place] - [address]  P: [phone]  F: [phone]

## 2024-12-09 ENCOUNTER — TELEPHONE (OUTPATIENT)
Age: 64
End: 2024-12-09

## 2024-12-09 DIAGNOSIS — J44.9 STAGE 4 VERY SEVERE COPD BY GOLD CLASSIFICATION (HCC): ICD-10-CM

## 2024-12-09 RX ORDER — FLUTICASONE FUROATE, UMECLIDINIUM BROMIDE AND VILANTEROL TRIFENATATE 200; 62.5; 25 UG/1; UG/1; UG/1
1 POWDER RESPIRATORY (INHALATION) DAILY
Qty: 60 BLISTER | Refills: 0 | Status: SHIPPED | OUTPATIENT
Start: 2024-12-09 | End: 2025-01-08

## 2024-12-09 RX ORDER — FLUTICASONE FUROATE, UMECLIDINIUM BROMIDE AND VILANTEROL TRIFENATATE 200; 62.5; 25 UG/1; UG/1; UG/1
1 POWDER RESPIRATORY (INHALATION) DAILY
Qty: 60 BLISTER | Refills: 0 | Status: SHIPPED | OUTPATIENT
Start: 2024-12-09 | End: 2024-12-09 | Stop reason: SDUPTHER

## 2024-12-09 RX ORDER — BUDESONIDE AND FORMOTEROL FUMARATE DIHYDRATE 160; 4.5 UG/1; UG/1
2 AEROSOL RESPIRATORY (INHALATION) DAILY PRN
Qty: 10.2 G | Refills: 5 | Status: SHIPPED | OUTPATIENT
Start: 2024-12-09

## 2024-12-09 NOTE — TELEPHONE ENCOUNTER
Incoming call:    Mathew requesting Trelegy be sent to his pharmacy. Only received the Symbicort. Routing.

## 2024-12-09 NOTE — TELEPHONE ENCOUNTER
Requested medication(s) are due for refill today: No  Patient has already received a courtesy refill: Yes  Other reason request has been forwarded to provider: protocol failed

## 2024-12-09 NOTE — TELEPHONE ENCOUNTER
Medication: Trelegy/Symbicort    Dose/Frequency:     Quantity:     Pharmacy: [facility]    Office:   [] PCP/Provider -   [x] Speciality/Provider -     Does the patient have enough for 3 days?   [] Yes   [x] No - Send as HP to POD

## 2024-12-09 NOTE — TELEPHONE ENCOUNTER
Pt states Symbicort can be used as needed Dr. King when he consulted with Dr. Ty at 09/30/24 OV. Pt is out of Trelegy and still needs refilled.    Please advised.

## 2024-12-17 ENCOUNTER — OFFICE VISIT (OUTPATIENT)
Dept: PODIATRY | Facility: CLINIC | Age: 64
End: 2024-12-17
Payer: COMMERCIAL

## 2024-12-17 VITALS — WEIGHT: 188 LBS | BODY MASS INDEX: 30.22 KG/M2 | HEIGHT: 66 IN

## 2024-12-17 DIAGNOSIS — L85.1 ACQUIRED KERATODERMA: ICD-10-CM

## 2024-12-17 DIAGNOSIS — L85.3 XEROSIS OF SKIN: ICD-10-CM

## 2024-12-17 DIAGNOSIS — E11.9 TYPE 2 DIABETES MELLITUS WITHOUT COMPLICATION, UNSPECIFIED WHETHER LONG TERM INSULIN USE (HCC): Primary | ICD-10-CM

## 2024-12-17 DIAGNOSIS — B35.1 ONYCHOMYCOSIS: ICD-10-CM

## 2024-12-17 PROCEDURE — 99203 OFFICE O/P NEW LOW 30 MIN: CPT | Performed by: PODIATRIST

## 2024-12-17 PROCEDURE — 11721 DEBRIDE NAIL 6 OR MORE: CPT | Performed by: PODIATRIST

## 2024-12-17 RX ORDER — UREA 40 %
CREAM (GRAM) TOPICAL 2 TIMES DAILY
Qty: 120 G | Refills: 3 | Status: SHIPPED | OUTPATIENT
Start: 2024-12-17

## 2024-12-17 NOTE — PATIENT INSTRUCTIONS
"OKAY to soak your feet in warm water for 10-15 minutes.  Then, dry them well and apply 40% Urea cream.  Do the foot care twice a day.       Foot care for people with diabetes   The Basics   Written by the doctors and editors at [facility]   Why is foot care important if I have diabetes? -- Diabetes can cause nerve damage if your blood sugar is high for a long time. The medical term for this is \"diabetic neuropathy.\"  If you have problems with the nerves in your feet, you might not be able to feel pain in your foot. Normally, people feel pain when they get a cut or a blister on their foot. The pain tells them that they need to treat their cut so it can heal. But people with nerve damage might not feel any pain when their feet get hurt. They might not even know that they have a cut, so they might not treat it. Problems that aren't treated right away can get much worse. For example, an untreated cut can get infected and turn into an open sore.  High blood sugar can also damage blood vessels and decrease blood flow to your feet. This can weaken your skin and make wounds take longer to heal. You are also more likely to get an infection if you have high blood sugar.  How do I take care of my feet? -- Taking good care of your feet can help prevent foot problems. You should:   Wash your feet every day with soap and warm water. Pat your feet dry, and be sure to dry the skin between your toes.   Keep your feet moisturized. Put lotion on the tops and bottoms of your feet, but not between your toes.   Check your feet every day (figure 1). Look for cuts, blisters, redness, or swelling. Use a mirror, or ask someone to help you check the bottoms of your feet. Check all parts of the foot, especially between the toes. Look for broken skin, ulcers, blisters, or redness.   Trim your toenails straight across when needed (figure 2). Do not cut the corners of your toenails. File rough edges. Do not cut your cuticles. Ask for help if you " cannot see well or have problems reaching your feet.   Ask your doctor or nurse to check your feet at each visit. Take your shoes and socks off for these checks.   See a foot care provider (such as a podiatrist) if you have an ingrown toenail, corn, or callus. Do not try to remove corns and calluses yourself.  How do I protect my feet from injury? -- There are several ways to protect your feet. You can:   Wear shoes and socks at all times, even at home. Do not walk barefoot. Wear swim shoes if you go to the beach or a swimming pool.   Choose shoes that fit well. They should not be not too tight or too loose. Your shoes should have plenty of room for your toes (figure 3). Your doctor might give you a prescription for special shoes. Check to see if they are covered by your insurance.   Check your shoes each time before you put them on to make sure that the lining is smooth. Also check to make sure that there is nothing inside the shoes before putting them on.   Do not wear shoes that expose any part of the foot, like sandals, thongs, or clogs.   Wear cotton socks that fit loosely. Do not wear shoes without socks.   Protect your feet from heat and cold. Test bath water before putting your feet in it to make sure that it is not too hot. Do not walk barefoot on hot ground. Take extra care when going outside in the cold and wear warm socks.  What else should I know? -- You can lower your risk for foot problems by keeping your blood sugar levels as close to your goal as possible. Other things you can do include:   Move your ankles and toes often to help with blood flow. You can wear a support stocking to help with swelling.   Walk often. Regular walking helps blood flow.   If you smoke, try to quit. Your doctor or nurse can help. Smoking causes poor blood flow to your feet and can damage your nerves.  When should I call the doctor? -- Call your doctor or nurse for advice if you have:   A fever of 100.4°F (38°C) or higher,  chills, or a wound that will not heal   Swelling, redness, warmth around a wound, a foul smell coming from a wound, or yellowish, greenish, or bloody discharge   Sores or blisters on your feet that hurt more or less than you would expect   Numbness or tingling in your foot or leg   Corns, calluses, blisters, or new sores on your foot   Very dry, scaly, or cracked skin on your feet   Changes in the way your foot joints or arch look  All topics are updated as new evidence becomes available and our peer review process is complete.  This topic retrieved from [employer] on: Mar 13, 2024.  Topic 080702 Version 2.0  Release: 32.2.4 - C32.71  © 2024 UpToDate, Inc. and/or its affiliates. All rights reserved.  figure 1: Foot check for people with diabetes     People with diabetes should check both of their feet every day. It is important to check your feet all over, including in between your toes. If you can't see the bottom of your foot, use a mirror or ask another person to check for you. Let your doctor or nurse know if you find any:  Redness   Cuts or cracks in the skin   Blisters   Swelling   Graphic 53858 Version 3.0  figure 2: Trim your toenails     Trim your toenails straight across and smooth them with a nail file.  Graphic 60711 Version 2.0  figure 3: Correct shoe shape     Choose shoes that fit the right way and are not too tight or too loose. Your shoes should have plenty of room for your toes.  Graphic 95723 Version 2.0  Consumer Information Use and Disclaimer   Disclaimer: This generalized information is a limited summary of diagnosis, treatment, and/or medication information. It is not meant to be comprehensive and should be used as a tool to help the user understand and/or assess potential diagnostic and treatment options. It does NOT include all information about conditions, treatments, medications, side effects, or risks that may apply to a specific patient. It is not intended to be medical advice or a  substitute for the medical advice, diagnosis, or treatment of a health care provider based on the health care provider's examination and assessment of a patient's specific and unique circumstances. Patients must speak with a health care provider for complete information about their health, medical questions, and treatment options, including any risks or benefits regarding use of medications. This information does not endorse any treatments or medications as safe, effective, or approved for treating a specific patient. UpToDate, Inc. and its affiliates disclaim any warranty or liability relating to this information or the use thereof.The use of this information is governed by the Terms of Use, available at https://www.woltersGraffitiTechuwer.com/en/know/clinical-effectiveness-terms. 2024© UpToDate, Inc. and its affiliates and/or licensors. All rights reserved.  Copyright   © 2024 UpToDate, Inc. and/or its affiliates. All rights reserved.

## 2024-12-17 NOTE — LETTER
December 17, 2024     Mariola Doyle MD  [address] [address]    Patient: Mathew Bird   YOB: 1960   Date of Visit: 12/17/2024       Dear Dr. Doyle:    Thank you for referring Mathew Bird to me for evaluation. Below are my notes for this consultation.    If you have questions, please do not hesitate to call me. I look forward to following your patient along with you.         Sincerely,        Lencho Hilton DPM        CC: No Recipients    Lencho Hilton DPM  12/17/2024  5:19 PM  Sign when Signing Visit        PATIENT:  Mathew Bird    1960    ASSESSMENT:     1. Type 2 diabetes mellitus without complication, unspecified whether long term insulin use (HCC)        2. Acquired keratoderma  Ambulatory Referral to Podiatry    urea (CARMOL) 40 %      3. Onychomycosis        4. Xerosis of skin  urea (CARMOL) 40 %            PLAN:  1.  Reviewed medical records.  Reviewed the note from PCP.  Patient was counseled on the condition and diagnosis.    2.  Educated disease prevention and risks related to diabetes.    3.  Educated proper daily foot care and exam.  Instructed proper skin care / protection and footwear.  Instructed to identify any signs of infection and related foot problem.    4.  The recent blood work was reviewed / discussed and the last HbA1c was 7.4.  Discussed proper blood glucose control with diet and exercise.    5.  He has severe xerosis / keratosis in his feet due to poor hygiene.  It is not possible to trim calluses.  Instructed warm soaking with skin care bid.  Rx: 40% Urea cream.  Stressed on daily foot care at home.    6. The patient will return in 3 months.      Imaging: I have personally reviewed pertinent films in PACS  Labs, pathology, and Other Studies: I have personally reviewed pertinent reports.        Procedure: All mycotic toenails were reduced and debrided in length, width, and girth using a nail nipper and dremel.  Patient tolerated procedure(s)  well without complications.        Subjective:          HPI  The patient was referred to my office for callus in his feet and diabetic foot evaluation.  He reports he is not sure why his doctor sent him to my office.  He was recently diagnosed with diabetes.  The blood glucose is under control.  No significant numbness or paresthesia.  Denied weakness or significant functional deficit.  He reports he has hard skin in his feet and thick nails. He had it for years.      The following portions of the patient's history were reviewed and updated as appropriate: allergies, current medications, past family history, past medical history, past social history, past surgical history and problem list.  All pertinent labs and images were reviewed.    Past Medical History  Past Medical History:   Diagnosis Date   • DEBBY (acute kidney injury) ([facility]) 07/08/2018   • Alcohol abuse 05/15/2018   • Anxiety    • Asthma    • Cardiac disease    • Cervical stenosis (uterine cervix)    • Chest pain    • Chronic kidney disease    • Colitis    • Colon polyps    • COPD (chronic obstructive pulmonary disease) ([facility])     2L @ HS and PRN   • Coronary artery disease    • Diverticulitis    • Esophageal reflux    • 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[facility])    • Heart failure, systolic, due to idiopathic cardiomyopathy ([facility]) 05/21/2018   • Hyperlipidemia    • Hypertension    • IBD (inflammatory bowel disease)    • Myocardial infarction ([facility])    • Old myocardial infarction    • Paroxysmal atrial fibrillation ([facility]) 08/24/2018   • Perforation of colon ([facility])    • Pericarditis 12/07/2016   • Type 2 diabetes, diet controlled ([facility])    • Ulcerative colitis ([facility])    • VT (ventricular tachycardia) ([facility]) 12/08/2023       Past Surgical History  Past Surgical History:   Procedure Laterality Date   • ANGIOPLASTY     • APPENDECTOMY     • COLON SURGERY     • COLONOSCOPY N/A 10/24/2016    Procedure: COLONOSCOPY;  Surgeon: Tirso Juárez MD;  Location: [facility];  Service:    •  CORONARY ANGIOPLASTY WITH STENT PLACEMENT     • ESOPHAGOGASTRODUODENOSCOPY N/A 10/24/2016    Procedure: ESOPHAGOGASTRODUODENOSCOPY (EGD);  Surgeon: Tirso Juárez MD;  Location: [facility];  Service:    • EXPLORATORY LAPAROTOMY W/ BOWEL RESECTION N/A 5/22/2018    Procedure: EXPLORATORY LAPAROTOMY, ILIOCOLECTOMY, ILIOCOLONIC ANASTAMOSIS, LOOP ILIOSTOMY, REPAIR OF SEROSAL TEAR, EXTENSIVE LYSIS OF ADHESIONS, WOUND VAC PLACEMENT;  Surgeon: Tirso Juárez MD;  Location: [facility];  Service: Colorectal   • HEMICOLECTOMY     • ILEOSTOMY CLOSURE N/A 10/5/2018    Procedure: CLOSURE ILEOSTOMY;  Surgeon: Tirso Juárez MD;  Location: [facility];  Service: Colorectal   • OTHER SURGICAL HISTORY      stent indications acute myocardial infarction   • SC COLONOSCOPY FLX DX W/COLLJ SPEC WHEN PFRMD N/A 2/6/2018    Procedure: COLONOSCOPY;  Surgeon: Tirso Juárez MD;  Location: [facility];  Service: Colorectal   • SC COLONOSCOPY FLX DX W/COLLJ SPEC WHEN PFRMD N/A 10/4/2018    Procedure: COLONOSCOPY;  Surgeon: Tirso Juárez MD;  Location: [facility];  Service: Colorectal   • TONSILLECTOMY          Allergies:  Medical tape, Other, and Latex    Medications:  Current Outpatient Medications   Medication Sig Dispense Refill   • albuterol (2.5 mg/3 mL) 0.083 % nebulizer solution Take 3 mL (2.5 mg total) by nebulization every 4 (four) hours as needed for wheezing or shortness of breath 375 mL 5   • albuterol (PROVENTIL HFA,VENTOLIN HFA) 90 mcg/act inhaler Inhale 2 puffs every 4 (four) hours as needed for wheezing or shortness of breath 18 g 0   • aspirin (Aspirin Low Dose) 81 mg chewable tablet CHEW 1 TABLET BY MOUTH EVERY DAY 90 tablet 1   • atorvastatin (LIPITOR) 40 mg tablet TAKE 1 TABLET BY MOUTH EVERY DAY WITH DINNER 90 tablet 1   • budesonide-formoterol (Symbicort) 160-4.5 mcg/act inhaler Inhale 2 puffs daily as needed (for shortness of breath) Rinse mouth after use. 10.2 g 5   • fluticasone-umeclidinium-vilanterol  (Trelegy Ellipta) 200-62.5-25 mcg/actuation AEPB inhaler Inhale 1 puff daily Rinse mouth after use. 60 blister 0   • ipratropium-albuterol (DUO-NEB) 0.5-2.5 mg/3 mL nebulizer solution INHALE 3 ML BY NEBULIZATION 4 TIMES A  mL 2   • metFORMIN (GLUCOPHAGE-XR) 750 mg 24 hr tablet Take 1 tablet (750 mg total) by mouth daily with breakfast 100 tablet 3   • metoprolol succinate (TOPROL-XL) 25 mg 24 hr tablet TAKE 1 TABLET (25 MG TOTAL) BY MOUTH DAILY AT BEDTIME DAILY AT BEDTIME 90 tablet 1   • Multiple Vitamins-Minerals (Centrum Silver 50+Men) TABS Take by mouth     • Nebulizers (AERONEB GO NEBULIZER HANDSET) MISC by Does not apply route 2 (two) times a day as needed (wheezing) 1 each 0   • nicotine (NICODERM CQ) 21 mg/24 hr TD 24 hr patch Place 1 patch on the skin over 24 hours daily 28 patch 0   • pantoprazole (PROTONIX) 40 mg tablet TAKE 1 TABLET BY MOUTH EVERY DAY 90 tablet 1   • sulfaSALAzine (AZULFIDINE) 500 mg tablet Take 1 tablet (500 mg total) by mouth 4 (four) times a day 360 tablet 0   • urea (CARMOL) 40 % Apply topically 2 (two) times a day 120 g 3   • varenicline (Chantix Continuing Month Shay) 1 mg tablet Take 1 tablet (1 mg total) by mouth 2 (two) times a day 60 tablet 1     No current facility-administered medications for this visit.       Social History:  Social History     Socioeconomic History   • Marital status:      Spouse name: None   • Number of children: None   • Years of education: None   • Highest education level: None   Occupational History   • None   Tobacco Use   • Smoking status: Former     Types: Cigars, Cigarettes     Start date: 10/31/2024     Quit date: 1976     Years since quittin.9     Passive exposure: Past   • Smokeless tobacco: Never   • Tobacco comments:     4-5cigars per day before nowadays smokes about 1 cigar, trying to cut down.   Vaping Use   • Vaping status: Never Used   Substance and Sexual Activity   • Alcohol use: Not Currently     Comment: last drink may  "2023   • Drug use: No   • Sexual activity: Yes     Partners: Female     Birth control/protection: None   Other Topics Concern   • None   Social History Narrative   • None     Social Drivers of Health     Financial Resource Strain: Not on file   Food Insecurity: No Food Insecurity (9/3/2024)    Nursing - Inadequate Food Risk Classification    • Worried About Running Out of Food in the Last Year: Never true    • Ran Out of Food in the Last Year: Never true    • Ran Out of Food in the Last Year: Not on file   Transportation Needs: No Transportation Needs (9/3/2024)    PRAPARE - Transportation    • Lack of Transportation (Medical): No    • Lack of Transportation (Non-Medical): No   Physical Activity: Not on file   Stress: Not on file   Social Connections: Not on file   Intimate Partner Violence: Not on file   Housing Stability: Low Risk  (9/3/2024)    Housing Stability Vital Sign    • Unable to Pay for Housing in the Last Year: No    • Number of Times Moved in the Last Year: 0    • Homeless in the Last Year: No        Review of Systems   Constitutional:  Negative for chills and fever.   Respiratory:  Negative for cough.           He is on oxygen due to COPD.       Cardiovascular:  Negative for chest pain.   Gastrointestinal:  Negative for nausea and vomiting.   Musculoskeletal:  Negative for gait problem.   Skin:  Negative for wound.   Neurological:  Negative for weakness and numbness.   Psychiatric/Behavioral:  Negative for confusion.          Objective:      Ht 5' 6\" (1.676 m)   Wt 85.3 kg (188 lb)   BMI 30.34 kg/m²          Physical Exam  Vitals reviewed.   Constitutional:       General: He is not in acute distress.     Appearance: He is not toxic-appearing or diaphoretic.   HENT:      Head: Normocephalic and atraumatic.   Eyes:      Extraocular Movements: Extraocular movements intact.   Cardiovascular:      Rate and Rhythm: Normal rate and regular rhythm.      Pulses: Normal pulses. no weak pulses.           " Dorsalis pedis pulses are 2+ on the right side and 2+ on the left side.        Posterior tibial pulses are 2+ on the right side and 2+ on the left side.   Pulmonary:      Effort: Pulmonary effort is normal. No respiratory distress.   Musculoskeletal:         General: No signs of injury.      Cervical back: Normal range of motion and neck supple.      Right lower leg: No edema.      Left lower leg: No edema.      Right foot: No Charcot foot or foot drop.      Left foot: No Charcot foot or foot drop.   Feet:      Right foot:      Protective Sensation: 10 sites tested.  10 sites sensed.      Skin integrity: Callus and dry skin present.      Left foot:      Protective Sensation: 10 sites tested.  10 sites sensed.      Skin integrity: Callus and dry skin present.   Skin:     General: Skin is warm and dry.      Capillary Refill: Capillary refill takes less than 2 seconds.      Coloration: Skin is not cyanotic or mottled.      Findings: No abscess or wound.      Nails: There is no clubbing.      Comments: Severe xerosis / keratosis in plantar feet bilaterally with dirt and grime.  Thick, mycotic nails X 10.     Neurological:      General: No focal deficit present.      Mental Status: He is alert and oriented to person, place, and time.      Cranial Nerves: No cranial nerve deficit.      Sensory: No sensory deficit.      Motor: No weakness.      Coordination: Coordination normal.   Psychiatric:         Mood and Affect: Mood normal.         Behavior: Behavior normal.         Thought Content: Thought content normal.         Judgment: Judgment normal.         Diabetic Foot Exam    Patient's shoes and socks removed.    Right Foot/Ankle   Right Foot Inspection  Skin Exam: skin intact, dry skin, callus and callus.     Toe Exam: No swelling, no tenderness, erythema and  no right toe deformity    Sensory   Proprioception: intact  Monofilament testing: intact    Vascular  Capillary refills: < 3 seconds  The right DP pulse is 2+. The  right PT pulse is 2+.     Left Foot/Ankle  Left Foot Inspection  Skin Exam: skin intact, dry skin and callus.     Toe Exam: No swelling, no tenderness, no erythema and no left toe deformity.     Sensory   Proprioception: intact  Monofilament testing: intact    Vascular  Capillary refills: < 3 seconds  The left DP pulse is 2+. The left PT pulse is 2+.     Assign Risk Category  No deformity present  No loss of protective sensation  No weak pulses  Risk: 0

## 2024-12-17 NOTE — PROGRESS NOTES
PATIENT:  Mathew Bird    1960    ASSESSMENT:     1. Type 2 diabetes mellitus without complication, unspecified whether long term insulin use (HCC)        2. Acquired keratoderma  Ambulatory Referral to Podiatry    urea (CARMOL) 40 %      3. Onychomycosis        4. Xerosis of skin  urea (CARMOL) 40 %            PLAN:  1.  Reviewed medical records.  Reviewed the note from PCP.  Patient was counseled on the condition and diagnosis.    2.  Educated disease prevention and risks related to diabetes.    3.  Educated proper daily foot care and exam.  Instructed proper skin care / protection and footwear.  Instructed to identify any signs of infection and related foot problem.    4.  The recent blood work was reviewed / discussed and the last HbA1c was 7.4.  Discussed proper blood glucose control with diet and exercise.    5.  He has severe xerosis / keratosis in his feet due to poor hygiene.  It is not possible to trim calluses.  Instructed warm soaking with skin care bid.  Rx: 40% Urea cream.  Stressed on daily foot care at home.    6. The patient will return in 3 months.      Imaging: I have personally reviewed pertinent films in PACS  Labs, pathology, and Other Studies: I have personally reviewed pertinent reports.        Procedure: All mycotic toenails were reduced and debrided in length, width, and girth using a nail nipper and dremel.  Patient tolerated procedure(s) well without complications.        Subjective:          HPI  The patient was referred to my office for callus in his feet and diabetic foot evaluation.  He reports he is not sure why his doctor sent him to my office.  He was recently diagnosed with diabetes.  The blood glucose is under control.  No significant numbness or paresthesia.  Denied weakness or significant functional deficit.  He reports he has hard skin in his feet and thick nails. He had it for years.      The following portions of the patient's history were reviewed and updated  as appropriate: allergies, current medications, past family history, past medical history, past social history, past surgical history and problem list.  All pertinent labs and images were reviewed.    Past Medical History  Past Medical History:   Diagnosis Date    DEBBY (acute kidney injury) ([facility]) 07/08/2018    Alcohol abuse 05/15/2018    Anxiety     Asthma     Cardiac disease     Cervical stenosis (uterine cervix)     Chest pain     Chronic kidney disease     Colitis     Colon polyps     COPD (chronic obstructive pulmonary disease) ([facility])     2L @ HS and PRN    Coronary artery disease     Diverticulitis     Esophageal reflux     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HCC)     Heart failure, systolic, due to idiopathic cardiomyopathy ([facility]) 05/21/2018    Hyperlipidemia     Hypertension     IBD (inflammatory bowel disease)     Myocardial infarction ([facility])     Old myocardial infarction     Paroxysmal atrial fibrillation ([facility]) 08/24/2018    Perforation of colon ([facility])     Pericarditis 12/07/2016    Type 2 diabetes, diet controlled ([facility])     Ulcerative colitis ([facility])     VT (ventricular tachycardia) ([facility]) 12/08/2023       Past Surgical History  Past Surgical History:   Procedure Laterality Date    ANGIOPLASTY      APPENDECTOMY      COLON SURGERY      COLONOSCOPY N/A 10/24/2016    Procedure: COLONOSCOPY;  Surgeon: Tirso Juárez MD;  [facility]: [facility];  Service:     CORONARY ANGIOPLASTY WITH STENT PLACEMENT      ESOPHAGOGASTRODUODENOSCOPY N/A 10/24/2016    Procedure: ESOPHAGOGASTRODUODENOSCOPY (EGD);  Surgeon: Tirso Juárez MD;  [facility];  Service:     EXPLORATORY LAPAROTOMY W/ BOWEL RESECTION N/A 5/22/2018    Procedure: EXPLORATORY LAPAROTOMY, ILIOCOLECTOMY, ILIOCOLONIC ANASTAMOSIS, LOOP ILIOSTOMY, REPAIR OF SEROSAL TEAR, EXTENSIVE LYSIS OF ADHESIONS, WOUND VAC PLACEMENT;  Surgeon: Tirso Juárez MD;  [facility];  Service: Colorectal    HEMICOLECTOMY      ILEOSTOMY CLOSURE N/A 10/5/2018    Procedure:  CLOSURE ILEOSTOMY;  Surgeon: Tirso Juárez MD;  [facility];  Service: Colorectal    OTHER SURGICAL HISTORY      stent indications acute myocardial infarction    CA COLONOSCOPY FLX DX W/COLLJ SPEC WHEN PFRMD N/A 2/6/2018    Procedure: COLONOSCOPY;  Surgeon: Tirso Juárez MD;  [facility]: [facility];  Service: Colorectal    CA COLONOSCOPY FLX DX W/COLLJ SPEC WHEN PFRMD N/A 10/4/2018    Procedure: COLONOSCOPY;  Surgeon: Tirso Juárez MD;  [facility];  Service: Colorectal    TONSILLECTOMY          Allergies:  Medical tape, Other, and Latex    Medications:  Current Outpatient Medications   Medication Sig Dispense Refill    albuterol (2.5 mg/3 mL) 0.083 % nebulizer solution Take 3 mL (2.5 mg total) by nebulization every 4 (four) hours as needed for wheezing or shortness of breath 375 mL 5    albuterol (PROVENTIL HFA,VENTOLIN HFA) 90 mcg/act inhaler Inhale 2 puffs every 4 (four) hours as needed for wheezing or shortness of breath 18 g 0    aspirin (Aspirin Low Dose) 81 mg chewable tablet CHEW 1 TABLET BY MOUTH EVERY DAY 90 tablet 1    atorvastatin (LIPITOR) 40 mg tablet TAKE 1 TABLET BY MOUTH EVERY DAY WITH DINNER 90 tablet 1    budesonide-formoterol (Symbicort) 160-4.5 mcg/act inhaler Inhale 2 puffs daily as needed (for shortness of breath) Rinse mouth after use. 10.2 g 5    fluticasone-umeclidinium-vilanterol (Trelegy Ellipta) 200-62.5-25 mcg/actuation AEPB inhaler Inhale 1 puff daily Rinse mouth after use. 60 blister 0    ipratropium-albuterol (DUO-NEB) 0.5-2.5 mg/3 mL nebulizer solution INHALE 3 ML BY NEBULIZATION 4 TIMES A  mL 2    metFORMIN (GLUCOPHAGE-XR) 750 mg 24 hr tablet Take 1 tablet (750 mg total) by mouth daily with breakfast 100 tablet 3    metoprolol succinate (TOPROL-XL) 25 mg 24 hr tablet TAKE 1 TABLET (25 MG TOTAL) BY MOUTH DAILY AT BEDTIME DAILY AT BEDTIME 90 tablet 1    Multiple Vitamins-Minerals (Centrum Silver 50+Men) TABS Take by mouth      Nebulizers (AERONEB  GO NEBULIZER HANDSET) MISC by Does not apply route 2 (two) times a day as needed (wheezing) 1 each 0    nicotine (NICODERM CQ) 21 mg/24 hr TD 24 hr patch Place 1 patch on the skin over 24 hours daily 28 patch 0    pantoprazole (PROTONIX) 40 mg tablet TAKE 1 TABLET BY MOUTH EVERY DAY 90 tablet 1    sulfaSALAzine (AZULFIDINE) 500 mg tablet Take 1 tablet (500 mg total) by mouth 4 (four) times a day 360 tablet 0    urea (CARMOL) 40 % Apply topically 2 (two) times a day 120 g 3    varenicline (Chantix Continuing Month Shay) 1 mg tablet Take 1 tablet (1 mg total) by mouth 2 (two) times a day 60 tablet 1     No current facility-administered medications for this visit.       Social History:  Social History     Socioeconomic History    Marital status:      Spouse name: None    Number of children: None    Years of education: None    Highest education level: None   Occupational History    None   Tobacco Use    Smoking status: Former     Types: Cigars, Cigarettes     Start date: 10/31/2024     Quit date:      Years since quittin.9     Passive exposure: Past    Smokeless tobacco: Never    Tobacco comments:     4-5cigars per day before nowadays smokes about 1 cigar, trying to cut down.   Vaping Use    Vaping status: Never Used   Substance and Sexual Activity    Alcohol use: Not Currently     Comment: last drink may 2023    Drug use: No    Sexual activity: Yes     Partners: Female     Birth control/protection: None   Other Topics Concern    None   Social History Narrative    None     Social Drivers of Health     Financial Resource Strain: Not on file   Food Insecurity: No Food Insecurity (9/3/2024)    Nursing - Inadequate Food Risk Classification     Worried About Running Out of Food in the Last Year: Never true     Ran Out of Food in the Last Year: Never true     Ran Out of Food in the Last Year: Not on file   Transportation Needs: No Transportation Needs (9/3/2024)    PRAPARE - Transportation     Lack of  "Transportation (Medical): No     Lack of Transportation (Non-Medical): No   Physical Activity: Not on file   Stress: Not on file   Social Connections: Not on file   Intimate Partner Violence: Not on file   Housing Stability: Low Risk  (9/3/2024)    Housing Stability Vital Sign     Unable to Pay for Housing in the Last Year: No     Number of Times Moved in the Last Year: 0     Homeless in the Last Year: No        Review of Systems   Constitutional:  Negative for chills and fever.   Respiratory:  Negative for cough.           He is on oxygen due to COPD.       Cardiovascular:  Negative for chest pain.   Gastrointestinal:  Negative for nausea and vomiting.   Musculoskeletal:  Negative for gait problem.   Skin:  Negative for wound.   Neurological:  Negative for weakness and numbness.   Psychiatric/Behavioral:  Negative for confusion.          Objective:      Ht 5' 6\" (1.676 m)   Wt 85.3 kg (188 lb)   BMI 30.34 kg/m²          Physical Exam  Vitals reviewed.   Constitutional:       General: He is not in acute distress.     Appearance: He is not toxic-appearing or diaphoretic.   HENT:      Head: Normocephalic and atraumatic.   Eyes:      Extraocular Movements: Extraocular movements intact.   Cardiovascular:      Rate and Rhythm: Normal rate and regular rhythm.      Pulses: Normal pulses. no weak pulses.           Dorsalis pedis pulses are 2+ on the right side and 2+ on the left side.        Posterior tibial pulses are 2+ on the right side and 2+ on the left side.   Pulmonary:      Effort: Pulmonary effort is normal. No respiratory distress.   Musculoskeletal:         General: No signs of injury.      Cervical back: Normal range of motion and neck supple.      Right lower leg: No edema.      Left lower leg: No edema.      Right foot: No Charcot foot or foot drop.      Left foot: No Charcot foot or foot drop.   Feet:      Right foot:      Protective Sensation: 10 sites tested.  10 sites sensed.      Skin integrity: Callus " and dry skin present.      Left foot:      Protective Sensation: 10 sites tested.  10 sites sensed.      Skin integrity: Callus and dry skin present.   Skin:     General: Skin is warm and dry.      Capillary Refill: Capillary refill takes less than 2 seconds.      Coloration: Skin is not cyanotic or mottled.      Findings: No abscess or wound.      Nails: There is no clubbing.      Comments: Severe xerosis / keratosis in plantar feet bilaterally with dirt and grime.  Thick, mycotic nails X 10.     Neurological:      General: No focal deficit present.      Mental Status: He is alert and oriented to person, place, and time.      Cranial Nerves: No cranial nerve deficit.      Sensory: No sensory deficit.      Motor: No weakness.      Coordination: Coordination normal.   Psychiatric:         Mood and Affect: Mood normal.         Behavior: Behavior normal.         Thought Content: Thought content normal.         Judgment: Judgment normal.         Diabetic Foot Exam    Patient's shoes and socks removed.    Right Foot/Ankle   Right Foot Inspection  Skin Exam: skin intact, dry skin, callus and callus.     Toe Exam: No swelling, no tenderness, erythema and  no right toe deformity    Sensory   Proprioception: intact  Monofilament testing: intact    Vascular  Capillary refills: < 3 seconds  The right DP pulse is 2+. The right PT pulse is 2+.     Left Foot/Ankle  Left Foot Inspection  Skin Exam: skin intact, dry skin and callus.     Toe Exam: No swelling, no tenderness, no erythema and no left toe deformity.     Sensory   Proprioception: intact  Monofilament testing: intact    Vascular  Capillary refills: < 3 seconds  The left DP pulse is 2+. The left PT pulse is 2+.     Assign Risk Category  No deformity present  No loss of protective sensation  No weak pulses  Risk: 0

## 2024-12-25 DIAGNOSIS — F17.200 NICOTINE DEPENDENCE, UNCOMPLICATED, UNSPECIFIED NICOTINE PRODUCT TYPE: ICD-10-CM

## 2024-12-26 RX ORDER — VARENICLINE TARTRATE 1 MG/1
1 TABLET, FILM COATED ORAL 2 TIMES DAILY
Qty: 180 TABLET | Refills: 1 | Status: SHIPPED | OUTPATIENT
Start: 2024-12-26

## 2024-12-31 ENCOUNTER — OFFICE VISIT (OUTPATIENT)
Dept: PULMONOLOGY | Facility: CLINIC | Age: 64
End: 2024-12-31
Payer: COMMERCIAL

## 2024-12-31 VITALS
WEIGHT: 188 LBS | HEART RATE: 103 BPM | DIASTOLIC BLOOD PRESSURE: 80 MMHG | HEIGHT: 66 IN | TEMPERATURE: 98.1 F | OXYGEN SATURATION: 99 % | BODY MASS INDEX: 30.22 KG/M2 | SYSTOLIC BLOOD PRESSURE: 142 MMHG

## 2024-12-31 DIAGNOSIS — R91.8 MULTIPLE PULMONARY NODULES: ICD-10-CM

## 2024-12-31 DIAGNOSIS — J96.11 CHRONIC RESPIRATORY FAILURE WITH HYPOXIA (HCC): ICD-10-CM

## 2024-12-31 DIAGNOSIS — J44.9 STAGE 4 VERY SEVERE COPD BY GOLD CLASSIFICATION (HCC): Primary | ICD-10-CM

## 2024-12-31 PROBLEM — J44.1 ACUTE EXACERBATION OF CHRONIC OBSTRUCTIVE PULMONARY DISEASE (COPD) (HCC): Status: RESOLVED | Noted: 2024-02-03 | Resolved: 2024-12-31

## 2024-12-31 PROCEDURE — 99214 OFFICE O/P EST MOD 30 MIN: CPT | Performed by: INTERNAL MEDICINE

## 2024-12-31 RX ORDER — IPRATROPIUM BROMIDE AND ALBUTEROL SULFATE 2.5; .5 MG/3ML; MG/3ML
3 SOLUTION RESPIRATORY (INHALATION) 3 TIMES DAILY
Qty: 810 ML | Refills: 2 | Status: SHIPPED | OUTPATIENT
Start: 2024-12-31

## 2024-12-31 RX ORDER — BUDESONIDE AND FORMOTEROL FUMARATE DIHYDRATE 160; 4.5 UG/1; UG/1
2 AEROSOL RESPIRATORY (INHALATION) DAILY PRN
Qty: 91.8 G | Refills: 2 | Status: SHIPPED | OUTPATIENT
Start: 2024-12-31

## 2024-12-31 RX ORDER — FLUTICASONE FUROATE, UMECLIDINIUM BROMIDE AND VILANTEROL TRIFENATATE 200; 62.5; 25 UG/1; UG/1; UG/1
1 POWDER RESPIRATORY (INHALATION) DAILY
Qty: 180 BLISTER | Refills: 0 | Status: SHIPPED | OUTPATIENT
Start: 2024-12-31 | End: 2025-03-31

## 2024-12-31 NOTE — PROGRESS NOTES
Pulmonary Follow Up Note   Mathew Bird 64 y.o. male MRN: [id number]  12/31/2024      Assessment/Plan:    Stage 4 very severe COPD by GOLD classification ([facility])  Did have exacerbation of COPD in 10/2024, since improved to baseline and actually starting to move around his house more than before.    Continue Trelegy with Symbicort PRN and Duonebs TID.  Updated prescriptions to 90-day supplies    Chronic respiratory failure with hypoxia (HCC)  In setting of severe COPD, stable and will continue 4L NC    Multiple pulmonary nodules  Noted to have multiple ground glass nodules on CT Chest 9/2024    Will order repeat CT chest at next visit      Orders:    Diagnoses and all orders for this visit:    Stage 4 very severe COPD by GOLD classification ([facility])  -     fluticasone-umeclidinium-vilanterol (Trelegy Ellipta) 200-62.5-25 mcg/actuation AEPB inhaler; Inhale 1 puff daily Rinse mouth after use.  -     ipratropium-albuterol (DUO-NEB) 0.5-2.5 mg/3 mL nebulizer solution; Take 3 mL by nebulization 3 (three) times a day  -     budesonide-formoterol (Symbicort) 160-4.5 mcg/act inhaler; Inhale 2 puffs daily as needed (for shortness of breath) Rinse mouth after use.    Chronic respiratory failure with hypoxia ([facility])    Multiple pulmonary nodules        Return in about 3 months (around 3/31/2025) for COPD.    History of Present Illness   HPI:  Mathew Bird is a 64 y.o. male who presents for follow up of severe COPD. He did have a hospitalization for a SBO in 11/2024.  He has been using his Trelegy as well as Symbicort as needed.  He is also been using his DuoNebs 3 times daily.  He has noted some improvement while doing this has been able to move around his house more.  He is also able to get from his house to his truck without having to stop which is an improvement.  He is hopeful to continue improving to where he can go up stairs.  He has also stopped smoking and is no longer smoking any cigars.  He stopped smoking on  10/31/2024.  He is following with his PCP started Chantix to help with this.  He is also not drinking any alcohol or any carbonated beverages after his SBO.    Discussed plan to continue current regimen and he is agreeable.  Updated prescription for 90-day supplies.  Encouraged him to call with any questions or concerns.  Will follow-up in 3 months and discuss repeat CT imaging at that time.  Encouraged him to continue with tobacco cessation encouraged and monitor progress.    Review of Systems   Constitutional:  Negative for activity change, appetite change, chills, fatigue and fever.   HENT:  Negative for congestion and postnasal drip.    Respiratory:  Positive for shortness of breath (With exertion, stable). Negative for cough, choking and chest tightness.    Cardiovascular:  Negative for chest pain and leg swelling.   Gastrointestinal:  Negative for abdominal pain, nausea and vomiting.       Historical Information   Past Medical History:   Diagnosis Date    DBEBY (acute kidney injury) (HCC) 07/08/2018    Alcohol abuse 05/15/2018    Anxiety     Asthma     Cardiac disease     Cervical stenosis (uterine cervix)     Chest pain     Chronic kidney disease     Colitis     Colon polyps     COPD (chronic obstructive pulmonary disease) (HCC)     2L @ HS and PRN    Coronary artery disease     Diverticulitis     Esophageal reflux     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HCC)     Heart failure, systolic, due to idiopathic cardiomyopathy (HCC) 05/21/2018    Hyperlipidemia     Hypertension     IBD (inflammatory bowel disease)     Myocardial infarction (HCC)     Old myocardial infarction     Paroxysmal atrial fibrillation (HCC) 08/24/2018    Perforation of colon (HCC)     Pericarditis 12/07/2016    Type 2 diabetes, diet controlled (HCC)     Ulcerative colitis (HCC)     VT (ventricular tachycardia) (HCC) 12/08/2023     Past Surgical History:   Procedure Laterality Date    ANGIOPLASTY      APPENDECTOMY      COLON SURGERY      COLONOSCOPY N/A  10/24/2016    Procedure: COLONOSCOPY;  Surgeon: Tirso Juárez MD;  [facility];  Service:     CORONARY ANGIOPLASTY WITH STENT PLACEMENT      ESOPHAGOGASTRODUODENOSCOPY N/A 10/24/2016    Procedure: ESOPHAGOGASTRODUODENOSCOPY (EGD);  Surgeon: Tirso Juárez MD;  [facility];  Service:     EXPLORATORY LAPAROTOMY W/ BOWEL RESECTION N/A 5/22/2018    Procedure: EXPLORATORY LAPAROTOMY, ILIOCOLECTOMY, ILIOCOLONIC ANASTAMOSIS, LOOP ILIOSTOMY, REPAIR OF SEROSAL TEAR, EXTENSIVE LYSIS OF ADHESIONS, WOUND VAC PLACEMENT;  Surgeon: Tirso Juárez MD;  [facility];  Service: Colorectal    HEMICOLECTOMY      ILEOSTOMY CLOSURE N/A 10/5/2018    Procedure: CLOSURE ILEOSTOMY;  Surgeon: Tirso Juárez MD;  [facility];  Service: Colorectal    OTHER SURGICAL HISTORY      stent indications acute myocardial infarction    IL COLONOSCOPY FLX DX W/COLLJ SPEC WHEN PFRMD N/A 2/6/2018    Procedure: COLONOSCOPY;  Surgeon: Tirso Juárez MD;  [facility];  Service: Colorectal    IL COLONOSCOPY FLX DX W/COLLJ SPEC WHEN PFRMD N/A 10/4/2018    Procedure: COLONOSCOPY;  Surgeon: Tirso Juárez MD;  [facility];  Service: Colorectal    TONSILLECTOMY       Family History   Problem Relation Age of Onset    Coronary artery disease Father     Crohn's disease Father     Stroke Father     Diabetes Family          Meds/Allergies     Current Outpatient Medications:     albuterol (2.5 mg/3 mL) 0.083 % nebulizer solution, Take 3 mL (2.5 mg total) by nebulization every 4 (four) hours as needed for wheezing or shortness of breath, Disp: 375 mL, Rfl: 5    albuterol (PROVENTIL HFA,VENTOLIN HFA) 90 mcg/act inhaler, Inhale 2 puffs every 4 (four) hours as needed for wheezing or shortness of breath, Disp: 18 g, Rfl: 0    aspirin (Aspirin Low Dose) 81 mg chewable tablet, CHEW 1 TABLET BY MOUTH EVERY DAY, Disp: 90 tablet, Rfl: 1    atorvastatin (LIPITOR) 40 mg tablet, TAKE 1 TABLET BY MOUTH EVERY DAY  "WITH DINNER, Disp: 90 tablet, Rfl: 1    budesonide-formoterol (Symbicort) 160-4.5 mcg/act inhaler, Inhale 2 puffs daily as needed (for shortness of breath) Rinse mouth after use., Disp: 91.8 g, Rfl: 2    fluticasone-umeclidinium-vilanterol (Trelegy Ellipta) 200-62.5-25 mcg/actuation AEPB inhaler, Inhale 1 puff daily Rinse mouth after use., Disp: 180 blister, Rfl: 0    ipratropium-albuterol (DUO-NEB) 0.5-2.5 mg/3 mL nebulizer solution, Take 3 mL by nebulization 3 (three) times a day, Disp: 810 mL, Rfl: 2    metFORMIN (GLUCOPHAGE-XR) 750 mg 24 hr tablet, Take 1 tablet (750 mg total) by mouth daily with breakfast, Disp: 100 tablet, Rfl: 3    metoprolol succinate (TOPROL-XL) 25 mg 24 hr tablet, TAKE 1 TABLET (25 MG TOTAL) BY MOUTH DAILY AT BEDTIME DAILY AT BEDTIME, Disp: 90 tablet, Rfl: 1    Multiple Vitamins-Minerals (Centrum Silver 50+Men) TABS, Take by mouth, Disp: , Rfl:     Nebulizers (AERONEB GO NEBULIZER HANDSET) MISC, by Does not apply route 2 (two) times a day as needed (wheezing), Disp: 1 each, Rfl: 0    nicotine (NICODERM CQ) 21 mg/24 hr TD 24 hr patch, Place 1 patch on the skin over 24 hours daily, Disp: 28 patch, Rfl: 0    pantoprazole (PROTONIX) 40 mg tablet, TAKE 1 TABLET BY MOUTH EVERY DAY, Disp: 90 tablet, Rfl: 1    sulfaSALAzine (AZULFIDINE) 500 mg tablet, Take 1 tablet (500 mg total) by mouth 4 (four) times a day, Disp: 360 tablet, Rfl: 0    urea (CARMOL) 40 %, Apply topically 2 (two) times a day, Disp: 120 g, Rfl: 3    varenicline (CHANTIX) 1 mg tablet, TAKE 1 TABLET BY MOUTH TWICE A DAY, Disp: 180 tablet, Rfl: 1  Allergies   Allergen Reactions    Medical Tape     Other      Brown cloth band aids       Latex Rash       Vitals: Blood pressure 142/80, pulse 103, temperature 98.1 °F (36.7 °C), temperature source Tympanic Core, height 5' 6\" (1.676 m), weight 85.3 kg (188 lb), SpO2 99%. Body mass index is 30.34 kg/m². Oxygen Therapy  SpO2: 99 %  Oxygen Therapy: None (Room air)      Physical Exam  Physical " "Exam  Vitals reviewed.   Constitutional:       General: He is not in acute distress.     Appearance: He is not toxic-appearing.   Cardiovascular:      Rate and Rhythm: Normal rate and regular rhythm.   Pulmonary:      Effort: Pulmonary effort is normal. No respiratory distress.      Breath sounds: Wheezing (Scattered end expiratory wheezing) present. No rhonchi or rales.      Comments: Largely diminished breath sounds throughout  Musculoskeletal:      Right lower leg: No edema.      Left lower leg: No edema.   Neurological:      Mental Status: He is alert.         Labs: I have personally reviewed pertinent lab results.  Lab Results   Component Value Date    WBC 7.11 11/04/2024    HGB 10.4 (L) 11/04/2024    HCT 34.3 (L) 11/04/2024    MCV 83 11/04/2024     11/04/2024     Lab Results   Component Value Date    GLUCOSE 92 07/08/2018    CALCIUM 7.7 (L) 11/04/2024     05/29/2015    K 3.7 11/04/2024    CO2 30 11/04/2024     11/04/2024    BUN 13 11/04/2024    CREATININE 1.03 11/04/2024     No results found for: \"IGE\"  Lab Results   Component Value Date    ALT 12 11/02/2024    AST 13 11/02/2024    ALKPHOS 81 11/02/2024    BILITOT 0.58 05/21/2015         Pulmonary function testing:  Spirometry 9/2024 showed FVC 2.51 (63%), FEV1 0.69 (22%), FEV1/FVC: 27%     Devang King M.D.  Pulmonary & Critical Care Fellow, PGY-IV     "

## 2024-12-31 NOTE — ASSESSMENT & PLAN NOTE
Noted to have multiple ground glass nodules on CT Chest 9/2024    Will order repeat CT chest at next visit

## 2024-12-31 NOTE — ASSESSMENT & PLAN NOTE
Did have exacerbation of COPD in 10/2024, since improved to baseline and actually starting to move around his house more than before.    Continue Trelegy with Symbicort PRN and Duonebs TID.  Updated prescriptions to 90-day supplies

## 2025-01-29 DIAGNOSIS — F17.210 CIGARETTE NICOTINE DEPENDENCE WITHOUT COMPLICATION: ICD-10-CM

## 2025-01-30 RX ORDER — NICOTINE 21 MG/24HR
1 PATCH, TRANSDERMAL 24 HOURS TRANSDERMAL DAILY
Qty: 28 PATCH | Refills: 0 | Status: SHIPPED | OUTPATIENT
Start: 2025-01-30

## 2025-02-01 DIAGNOSIS — K50.918 CROHN'S DISEASE WITH OTHER COMPLICATION, UNSPECIFIED GASTROINTESTINAL TRACT LOCATION (HCC): ICD-10-CM

## 2025-02-01 DIAGNOSIS — K92.2 GI BLEEDING: ICD-10-CM

## 2025-02-02 RX ORDER — PANTOPRAZOLE SODIUM 40 MG/1
40 TABLET, DELAYED RELEASE ORAL DAILY
Qty: 90 TABLET | Refills: 1 | Status: SHIPPED | OUTPATIENT
Start: 2025-02-02

## 2025-02-03 RX ORDER — SULFASALAZINE 500 MG/1
500 TABLET ORAL 4 TIMES DAILY
Qty: 360 TABLET | Refills: 0 | Status: SHIPPED | OUTPATIENT
Start: 2025-02-03 | End: 2025-05-04

## 2025-02-12 ENCOUNTER — OFFICE VISIT (OUTPATIENT)
Dept: FAMILY MEDICINE CLINIC | Facility: CLINIC | Age: 65
End: 2025-02-12
Payer: COMMERCIAL

## 2025-02-12 VITALS
HEART RATE: 105 BPM | OXYGEN SATURATION: 96 % | HEIGHT: 66 IN | DIASTOLIC BLOOD PRESSURE: 70 MMHG | RESPIRATION RATE: 17 BRPM | BODY MASS INDEX: 30.86 KG/M2 | WEIGHT: 192 LBS | SYSTOLIC BLOOD PRESSURE: 110 MMHG | TEMPERATURE: 97.5 F

## 2025-02-12 DIAGNOSIS — J44.9 STAGE 4 VERY SEVERE COPD BY GOLD CLASSIFICATION (HCC): ICD-10-CM

## 2025-02-12 DIAGNOSIS — E11.69 TYPE 2 DIABETES MELLITUS WITH OTHER SPECIFIED COMPLICATION, WITHOUT LONG-TERM CURRENT USE OF INSULIN (HCC): ICD-10-CM

## 2025-02-12 DIAGNOSIS — F51.01 PRIMARY INSOMNIA: ICD-10-CM

## 2025-02-12 DIAGNOSIS — E78.5 DYSLIPIDEMIA: ICD-10-CM

## 2025-02-12 DIAGNOSIS — Z00.00 ANNUAL PHYSICAL EXAM: Primary | ICD-10-CM

## 2025-02-12 DIAGNOSIS — J96.11 CHRONIC RESPIRATORY FAILURE WITH HYPOXIA (HCC): ICD-10-CM

## 2025-02-12 PROBLEM — J40 BRONCHITIS: Status: RESOLVED | Noted: 2024-09-02 | Resolved: 2025-02-12

## 2025-02-12 LAB — SL AMB POCT HEMOGLOBIN AIC: 6 (ref ?–6.5)

## 2025-02-12 PROCEDURE — 83036 HEMOGLOBIN GLYCOSYLATED A1C: CPT | Performed by: FAMILY MEDICINE

## 2025-02-12 PROCEDURE — 99214 OFFICE O/P EST MOD 30 MIN: CPT | Performed by: FAMILY MEDICINE

## 2025-02-12 PROCEDURE — 99396 PREV VISIT EST AGE 40-64: CPT | Performed by: FAMILY MEDICINE

## 2025-02-12 RX ORDER — TRAZODONE HYDROCHLORIDE 50 MG/1
50 TABLET, FILM COATED ORAL
Qty: 30 TABLET | Refills: 5 | Status: SHIPPED | OUTPATIENT
Start: 2025-02-12

## 2025-02-12 NOTE — ASSESSMENT & PLAN NOTE
Lab Results   Component Value Date    HGBA1C 6.0 02/12/2025   Stable on metformin   Reminded about labs     Orders:  •  POCT hemoglobin A1c  •  Albumin / creatinine urine ratio; Future  •  Comprehensive metabolic panel; Future  •  Hemoglobin A1C; Future  •  CBC and Platelet; Future  •  TSH, 3rd generation with Free T4 reflex; Future  •  Lipid Panel with Direct LDL reflex; Future

## 2025-02-12 NOTE — PROGRESS NOTES
Adult Annual Physical  Name: Mathew Bird      : 1960      MRN: [id number]  Encounter Provider: Mariola Doyle MD  Encounter Date: 2025   Encounter department: [facility]    Assessment & Plan  Annual physical exam         Type 2 diabetes mellitus with other specified complication, without long-term current use of insulin (HCC)    Lab Results   Component Value Date    HGBA1C 6.0 2025   Stable on metformin   Reminded about labs     Orders:  •  POCT hemoglobin A1c  •  Albumin / creatinine urine ratio; Future  •  Comprehensive metabolic panel; Future  •  Hemoglobin A1C; Future  •  CBC and Platelet; Future  •  TSH, 3rd generation with Free T4 reflex; Future  •  Lipid Panel with Direct LDL reflex; Future    Chronic respiratory failure with hypoxia (HCC)  On 4 L O2  all day       Stage 4 very severe COPD by GOLD classification (HCC)  Stable on current meds  Sess pulmonary       Dyslipidemia  Stable on current meds       Primary insomnia  Not sleeping well   Trial of Trazodone     Orders:  •  traZODone (DESYREL) 50 mg tablet; Take 1 tablet (50 mg total) by mouth daily at bedtime    Immunizations and preventive care screenings were discussed with patient today. Appropriate education was printed on patient's after visit summary.    Discussed risks and benefits of prostate cancer screening. We discussed the controversial history of PSA screening for prostate cancer in the [place] as well as the risk of over detection and over treatment of prostate cancer by way of PSA screening.  The patient understands that PSA blood testing is an imperfect way to screen for prostate cancer and that elevated PSA levels in the blood may also be caused by infection, inflammation, prostatic trauma or manipulation, urological procedures, or by benign prostatic enlargement.    The role of the digital rectal examination in prostate cancer screening was also discussed and I discussed with him that  there is large interobserver variability in the findings of digital rectal examination.    Counseling:  Alcohol/drug use: discussed moderation in alcohol intake, the recommendations for healthy alcohol use, and avoidance of illicit drug use.  Dental Health: discussed importance of regular tooth brushing, flossing, and dental visits.  Injury prevention: discussed safety/seat belts, safety helmets, smoke detectors, carbon monoxide detectors, and smoking near bedding or upholstery.  Sexual health: discussed sexually transmitted diseases, partner selection, use of condoms, avoidance of unintended pregnancy, and contraceptive alternatives.  Exercise: the importance of regular exercise/physical activity was discussed. Recommend exercise 3-5 times per week for at least 30 minutes.          History of Present Illness     Patient is not sleeping   Trouble falling and staying asleep    Adult Annual Physical:  Patient presents for annual physical.     Diet and Physical Activity:  - Diet/Nutrition: limited junk food.  - Exercise: no formal exercise.    General Health:  - Sleep: sleeps poorly and 4-6 hours of sleep on average.  - Hearing: normal hearing bilateral ears.  - Vision: goes for regular eye exams.  - Dental: does not brush teeth regularly.    /GYN Health:    - History of STDs: no     Health:  - History of STDs: no.     Advanced Care Planning:  - Has an advanced directive?: no    - Has a durable medical POA?: no    - ACP document given to patient?: no      Review of Systems   Constitutional:  Negative for chills and fever.   HENT:  Negative for ear pain and sore throat.    Eyes:  Negative for pain and visual disturbance.   Respiratory:  Negative for cough and shortness of breath.    Cardiovascular:  Negative for chest pain and palpitations.   Gastrointestinal:  Negative for abdominal pain and vomiting.   Genitourinary:  Negative for dysuria and hematuria.   Musculoskeletal:  Negative for arthralgias and back pain.    Skin:  Negative for color change and rash.   Neurological:  Negative for seizures and syncope.   Hematological: Negative.    Psychiatric/Behavioral: Negative.     All other systems reviewed and are negative.    Medical History Reviewed by provider this encounter:  Tobacco  Allergies  Meds  Problems  Med Hx  Surg Hx  Fam Hx     .  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[facility])    • Heart failure, systolic, due to idiopathic cardiomyopathy ([facility]) 05/21/2018   • Hyperlipidemia    • Hypertension    • IBD (inflammatory bowel disease)    • Myocardial infarction ([facility])    • Old myocardial infarction    • Paroxysmal atrial fibrillation ([facility]) 08/24/2018   • Perforation of colon ([facility])    • Pericarditis 12/07/2016   • Type 2 diabetes, diet controlled ([facility])    • Ulcerative colitis ([facility])    • VT (ventricular tachycardia) ([facility]) 12/08/2023     Past Surgical History:   Procedure Laterality Date   • ANGIOPLASTY     • APPENDECTOMY     • COLON SURGERY     • COLONOSCOPY N/A 10/24/2016    Procedure: COLONOSCOPY;  Surgeon: Tirso Juárez MD;  Location: [facility];  Service:    • CORONARY ANGIOPLASTY WITH STENT PLACEMENT     • ESOPHAGOGASTRODUODENOSCOPY N/A 10/24/2016    Procedure: ESOPHAGOGASTRODUODENOSCOPY (EGD);  Surgeon: Tirso Juárez MD;  Location: [facility];  Service:    • EXPLORATORY LAPAROTOMY W/ BOWEL RESECTION N/A 5/22/2018    Procedure: EXPLORATORY LAPAROTOMY, ILIOCOLECTOMY, ILIOCOLONIC ANASTAMOSIS, LOOP ILIOSTOMY, REPAIR OF SEROSAL TEAR, EXTENSIVE LYSIS OF ADHESIONS, WOUND VAC PLACEMENT;   Surgeon: Tirso Juárez MD;  Location: [facility];  Service: Colorectal   • HEMICOLECTOMY     • ILEOSTOMY CLOSURE N/A 10/5/2018    Procedure: CLOSURE ILEOSTOMY;  Surgeon: Tirso Juárez MD;  Location: [facility];  Service: Colorectal   • OTHER SURGICAL HISTORY      stent indications acute myocardial infarction   • MI COLONOSCOPY FLX DX W/COLLJ SPEC WHEN PFRMD N/A 2/6/2018    Procedure: COLONOSCOPY;  Surgeon: Tirso Juárez MD;  [facility]: [facility];  Service: Colorectal   • MI COLONOSCOPY FLX DX W/COLLJ SPEC WHEN PFRMD N/A 10/4/2018    Procedure: COLONOSCOPY;  Surgeon: Tirso Juárez MD;  Location: [facility];  Service: Colorectal   • TONSILLECTOMY       Family History   Problem Relation Age of Onset   • Coronary artery disease Father    • Crohn's disease Father    • Stroke Father    • Diabetes Family       reports that he quit smoking about 49 years ago. His smoking use included cigars and cigarettes. He started smoking about 3 months ago. He has been exposed to tobacco smoke. He has never used smokeless tobacco. He reports that he does not currently use alcohol. He reports that he does not use drugs.  Current Outpatient Medications on File Prior to Visit   Medication Sig Dispense Refill   • albuterol (2.5 mg/3 mL) 0.083 % nebulizer solution Take 3 mL (2.5 mg total) by nebulization every 4 (four) hours as needed for wheezing or shortness of breath 375 mL 5   • albuterol (PROVENTIL HFA,VENTOLIN HFA) 90 mcg/act inhaler Inhale 2 puffs every 4 (four) hours as needed for wheezing or shortness of breath 18 g 0   • aspirin (Aspirin Low Dose) 81 mg chewable tablet CHEW 1 TABLET BY MOUTH EVERY DAY 90 tablet 1   • atorvastatin (LIPITOR) 40 mg tablet TAKE 1 TABLET BY MOUTH EVERY DAY WITH DINNER 90 tablet 1   • budesonide-formoterol (Symbicort) 160-4.5 mcg/act inhaler Inhale 2 puffs daily as needed (for shortness of breath) Rinse mouth after use. 91.8 g 2   • fluticasone-umeclidinium-vilanterol (Trelegy Ellipta)  200-62.5-25 mcg/actuation AEPB inhaler Inhale 1 puff daily Rinse mouth after use. 180 blister 0   • ipratropium-albuterol (DUO-NEB) 0.5-2.5 mg/3 mL nebulizer solution Take 3 mL by nebulization 3 (three) times a day 810 mL 2   • metFORMIN (GLUCOPHAGE-XR) 750 mg 24 hr tablet Take 1 tablet (750 mg total) by mouth daily with breakfast 100 tablet 3   • metoprolol succinate (TOPROL-XL) 25 mg 24 hr tablet TAKE 1 TABLET (25 MG TOTAL) BY MOUTH DAILY AT BEDTIME DAILY AT BEDTIME 90 tablet 1   • Multiple Vitamins-Minerals (Centrum Silver 50+Men) TABS Take by mouth     • Nebulizers (AERONEB GO NEBULIZER HANDSET) MISC by Does not apply route 2 (two) times a day as needed (wheezing) 1 each 0   • nicotine (NICODERM CQ) 21 mg/24 hr TD 24 hr patch Place 1 patch on the skin over 24 hours daily 28 patch 0   • pantoprazole (PROTONIX) 40 mg tablet TAKE 1 TABLET BY MOUTH EVERY DAY 90 tablet 1   • sulfaSALAzine (AZULFIDINE) 500 mg tablet TAKE 1 TABLET (500 MG TOTAL) BY MOUTH 4 (FOUR) TIMES A DAY. 360 tablet 0   • varenicline (CHANTIX) 1 mg tablet TAKE 1 TABLET BY MOUTH TWICE A  tablet 1   • [DISCONTINUED] urea (CARMOL) 40 % Apply topically 2 (two) times a day 120 g 3     No current facility-administered medications on file prior to visit.     Allergies   Allergen Reactions   • Medical Tape    • Other      Brown cloth band aids      • Latex Rash      Current Outpatient Medications on File Prior to Visit   Medication Sig Dispense Refill   • albuterol (2.5 mg/3 mL) 0.083 % nebulizer solution Take 3 mL (2.5 mg total) by nebulization every 4 (four) hours as needed for wheezing or shortness of breath 375 mL 5   • albuterol (PROVENTIL HFA,VENTOLIN HFA) 90 mcg/act inhaler Inhale 2 puffs every 4 (four) hours as needed for wheezing or shortness of breath 18 g 0   • aspirin (Aspirin Low Dose) 81 mg chewable tablet CHEW 1 TABLET BY MOUTH EVERY DAY 90 tablet 1   • atorvastatin (LIPITOR) 40 mg tablet TAKE 1 TABLET BY MOUTH EVERY DAY WITH DINNER  90 tablet 1   • budesonide-formoterol (Symbicort) 160-4.5 mcg/act inhaler Inhale 2 puffs daily as needed (for shortness of breath) Rinse mouth after use. 91.8 g 2   • fluticasone-umeclidinium-vilanterol (Trelegy Ellipta) 200-62.5-25 mcg/actuation AEPB inhaler Inhale 1 puff daily Rinse mouth after use. 180 blister 0   • ipratropium-albuterol (DUO-NEB) 0.5-2.5 mg/3 mL nebulizer solution Take 3 mL by nebulization 3 (three) times a day 810 mL 2   • metFORMIN (GLUCOPHAGE-XR) 750 mg 24 hr tablet Take 1 tablet (750 mg total) by mouth daily with breakfast 100 tablet 3   • metoprolol succinate (TOPROL-XL) 25 mg 24 hr tablet TAKE 1 TABLET (25 MG TOTAL) BY MOUTH DAILY AT BEDTIME DAILY AT BEDTIME 90 tablet 1   • Multiple Vitamins-Minerals (Centrum Silver 50+Men) TABS Take by mouth     • Nebulizers (AERONEB GO NEBULIZER HANDSET) MISC by Does not apply route 2 (two) times a day as needed (wheezing) 1 each 0   • nicotine (NICODERM CQ) 21 mg/24 hr TD 24 hr patch Place 1 patch on the skin over 24 hours daily 28 patch 0   • pantoprazole (PROTONIX) 40 mg tablet TAKE 1 TABLET BY MOUTH EVERY DAY 90 tablet 1   • sulfaSALAzine (AZULFIDINE) 500 mg tablet TAKE 1 TABLET (500 MG TOTAL) BY MOUTH 4 (FOUR) TIMES A DAY. 360 tablet 0   • varenicline (CHANTIX) 1 mg tablet TAKE 1 TABLET BY MOUTH TWICE A  tablet 1   • [DISCONTINUED] urea (CARMOL) 40 % Apply topically 2 (two) times a day 120 g 3     No current facility-administered medications on file prior to visit.      Social History     Tobacco Use   • Smoking status: Former     Types: Cigars, Cigarettes     Start date: 10/31/2024     Quit date:      Years since quittin.1     Passive exposure: Past   • Smokeless tobacco: Never   • Tobacco comments:     4-5cigars per day before nowadays smokes about 1 cigar, trying to cut down.   Vaping Use   • Vaping status: Never Used   Substance and Sexual Activity   • Alcohol use: Not Currently     Comment: last drink may 2023   • Drug use: No  "  • Sexual activity: Yes     Partners: Female     Birth control/protection: None       Objective   /70 (BP Location: Left arm, Patient Position: Sitting, Cuff Size: Standard)   Pulse 105   Temp 97.5 °F (36.4 °C) (Tympanic)   Resp 17   Ht 5' 6.1\" (1.679 m)   Wt 87.1 kg (192 lb)   SpO2 96%   BMI 30.89 kg/m²     Physical Exam  Vitals and nursing note reviewed.   Constitutional:       Appearance: Normal appearance. He is well-developed.   HENT:      Head: Normocephalic and atraumatic.      Right Ear: Tympanic membrane normal.      Nose: Nose normal.   Eyes:      Pupils: Pupils are equal, round, and reactive to light.   Neck:      Thyroid: No thyromegaly.   Cardiovascular:      Rate and Rhythm: Normal rate and regular rhythm.      Heart sounds: No murmur heard.  Pulmonary:      Effort: Pulmonary effort is normal.      Breath sounds: Normal breath sounds.   Abdominal:      General: Bowel sounds are normal.      Palpations: Abdomen is soft.   Musculoskeletal:         General: No deformity. Normal range of motion.      Cervical back: Normal range of motion and neck supple.   Skin:     General: Skin is warm.      Findings: No erythema or rash.   Neurological:      General: No focal deficit present.      Mental Status: He is alert and oriented to person, place, and time.      Deep Tendon Reflexes: Reflexes are normal and symmetric.   Psychiatric:         Mood and Affect: Mood normal.         Behavior: Behavior normal.         "

## 2025-02-23 DIAGNOSIS — I25.10 CORONARY ARTERY DISEASE INVOLVING NATIVE CORONARY ARTERY OF NATIVE HEART WITHOUT ANGINA PECTORIS: ICD-10-CM

## 2025-02-23 DIAGNOSIS — F17.200 NICOTINE DEPENDENCE, UNCOMPLICATED, UNSPECIFIED NICOTINE PRODUCT TYPE: ICD-10-CM

## 2025-02-25 RX ORDER — ASPIRIN 81 MG
81 TABLET,CHEWABLE ORAL DAILY
Qty: 90 TABLET | Refills: 1 | Status: SHIPPED | OUTPATIENT
Start: 2025-02-25

## 2025-02-25 RX ORDER — VARENICLINE TARTRATE 1 MG/1
1 TABLET, FILM COATED ORAL 2 TIMES DAILY
Qty: 180 TABLET | Refills: 2 | Status: SHIPPED | OUTPATIENT
Start: 2025-02-25

## 2025-02-26 DIAGNOSIS — J44.9 STAGE 4 VERY SEVERE COPD BY GOLD CLASSIFICATION (HCC): ICD-10-CM

## 2025-02-26 RX ORDER — BUDESONIDE AND FORMOTEROL FUMARATE DIHYDRATE 160; 4.5 UG/1; UG/1
2 AEROSOL RESPIRATORY (INHALATION) DAILY PRN
Qty: 91.8 G | Refills: 3 | Status: SHIPPED | OUTPATIENT
Start: 2025-02-26

## 2025-02-26 NOTE — TELEPHONE ENCOUNTER
Reason for call:   [x] Refill   [] Prior Auth  [] Other:     Office:   [] PCP/Provider -   [x] Specialty/Provider - [facility]     Medication:     budesonide-formoterol (Symbicort) 160-4.5 mcg/act inhaler       Dose/Frequency: 2 puff, Daily PRN     Quantity: 91.8 g    Pharmacy: [id number]    Does the patient have enough for 3 days?   [] Yes   [x] No - Send as HP to POD

## 2025-02-27 DIAGNOSIS — F17.210 CIGARETTE NICOTINE DEPENDENCE WITHOUT COMPLICATION: ICD-10-CM

## 2025-02-27 RX ORDER — NICOTINE 21 MG/24HR
1 PATCH, TRANSDERMAL 24 HOURS TRANSDERMAL DAILY
Qty: 28 PATCH | Refills: 0 | Status: SHIPPED | OUTPATIENT
Start: 2025-02-27

## 2025-02-28 DIAGNOSIS — J44.9 STAGE 4 VERY SEVERE COPD BY GOLD CLASSIFICATION (HCC): ICD-10-CM

## 2025-02-28 RX ORDER — ALBUTEROL SULFATE 90 UG/1
INHALANT RESPIRATORY (INHALATION)
Qty: 18 G | Refills: 5 | Status: SHIPPED | OUTPATIENT
Start: 2025-02-28

## 2025-03-07 DIAGNOSIS — F51.01 PRIMARY INSOMNIA: ICD-10-CM

## 2025-03-10 RX ORDER — TRAZODONE HYDROCHLORIDE 50 MG/1
50 TABLET ORAL
Qty: 90 TABLET | Refills: 2 | Status: SHIPPED | OUTPATIENT
Start: 2025-03-10

## 2025-03-19 ENCOUNTER — OFFICE VISIT (OUTPATIENT)
Dept: PODIATRY | Facility: CLINIC | Age: 65
End: 2025-03-19
Payer: COMMERCIAL

## 2025-03-19 VITALS — WEIGHT: 190 LBS | BODY MASS INDEX: 30.53 KG/M2 | HEIGHT: 66 IN

## 2025-03-19 DIAGNOSIS — E11.9 TYPE 2 DIABETES MELLITUS WITHOUT COMPLICATION, UNSPECIFIED WHETHER LONG TERM INSULIN USE (HCC): Primary | ICD-10-CM

## 2025-03-19 DIAGNOSIS — B35.1 ONYCHOMYCOSIS: ICD-10-CM

## 2025-03-19 DIAGNOSIS — Z79.01 LONG TERM CURRENT USE OF ANTICOAGULANT: ICD-10-CM

## 2025-03-19 PROCEDURE — RECHECK: Performed by: PODIATRIST

## 2025-03-19 PROCEDURE — 11721 DEBRIDE NAIL 6 OR MORE: CPT | Performed by: PODIATRIST

## 2025-03-19 NOTE — PROGRESS NOTES
Name: Mathew Bird      : 1960      MRN: [id number]  Encounter Provider: Robinson Greenberg DPM  Encounter Date: 3/19/2025   [facility]  :  Assessment & Plan  Type 2 diabetes mellitus without complication, unspecified whether long term insulin use (formerly [facility])    Lab Results   Component Value Date    HGBA1C 6.0 2025            Onychomycosis       Debride mycotic nails and thin the nail plates x 10 with the use of a nail nipper manually and an electric Dremel bur was used to reduce the thickness of the nail beds and smoothed the distal aspect of the nails.   Long term current use of anticoagulant         Pt was instructed to use lotion once a day on both feet such as cerave, Cetaphil or similar thick style of lotion.   Using an electric Dremel bur the plantar aspect of the foot xerosis was removed to pink healthy skin in a majority of the areas.  Discussed proper shoe gear, daily inspections of feet, and general foot health with patient. Patient has Q8  findings and is recommended for at risk foot care every 9-10 weeks.    Patients most recent complete clinical foot exam was on: 2024      History of Present Illness   HPI  Mathew Bird is a 64 y.o. male who presents with chief complaint of painful thick nails on both feet and scaly tissue present on the bottom of both feet as well.  Patient is putting a Vaseline type product on the bottom of both feet several times a week.  He is a diabetic who sugar is stable.Patient presents for at-risk foot care.  Patient has no acute concerns today.  Patient has significant lower extremity risk due to neuropathy, parasthesia, edema, and trophic skin changes to the lower extremity.   History obtained from: patient    Review of Systems  Medical History Reviewed by provider this encounter:     .  Current Outpatient Medications on File Prior to Visit   Medication Sig Dispense Refill    albuterol (2.5 mg/3 mL) 0.083 %  nebulizer solution Take 3 mL (2.5 mg total) by nebulization every 4 (four) hours as needed for wheezing or shortness of breath 375 mL 5    albuterol (PROVENTIL HFA,VENTOLIN HFA) 90 mcg/act inhaler INHALE 2 PUFFS BY MOUTH EVERY 4 HOURS AS NEEDED FOR WHEEZE 18 g 5    aspirin (Aspirin Low Dose) 81 mg chewable tablet CHEW 1 TABLET BY MOUTH EVERY DAY 90 tablet 1    atorvastatin (LIPITOR) 40 mg tablet TAKE 1 TABLET BY MOUTH EVERY DAY WITH DINNER 90 tablet 1    budesonide-formoterol (Symbicort) 160-4.5 mcg/act inhaler Inhale 2 puffs daily as needed (for shortness of breath) Rinse mouth after use. 91.8 g 3    fluticasone-umeclidinium-vilanterol (Trelegy Ellipta) 200-62.5-25 mcg/actuation AEPB inhaler Inhale 1 puff daily Rinse mouth after use. 180 blister 0    ipratropium-albuterol (DUO-NEB) 0.5-2.5 mg/3 mL nebulizer solution Take 3 mL by nebulization 3 (three) times a day 810 mL 2    metFORMIN (GLUCOPHAGE-XR) 750 mg 24 hr tablet Take 1 tablet (750 mg total) by mouth daily with breakfast 100 tablet 3    metoprolol succinate (TOPROL-XL) 25 mg 24 hr tablet TAKE 1 TABLET (25 MG TOTAL) BY MOUTH DAILY AT BEDTIME DAILY AT BEDTIME 90 tablet 1    Multiple Vitamins-Minerals (Centrum Silver 50+Men) TABS Take by mouth      Nebulizers (AERONEB GO NEBULIZER HANDSET) MISC by Does not apply route 2 (two) times a day as needed (wheezing) 1 each 0    nicotine (NICODERM CQ) 21 mg/24 hr TD 24 hr patch PLACE 1 PATCH ON THE SKIN OVER 24 HOURS DAILY 28 patch 0    pantoprazole (PROTONIX) 40 mg tablet TAKE 1 TABLET BY MOUTH EVERY DAY 90 tablet 1    sulfaSALAzine (AZULFIDINE) 500 mg tablet TAKE 1 TABLET (500 MG TOTAL) BY MOUTH 4 (FOUR) TIMES A DAY. 360 tablet 0    traZODone (DESYREL) 50 mg tablet TAKE 1 TABLET BY MOUTH DAILY AT BEDTIME 90 tablet 2    varenicline (CHANTIX) 1 mg tablet TAKE 1 TABLET BY MOUTH TWICE A  tablet 2     No current facility-administered medications on file prior to visit.      Social History     Tobacco Use    Smoking  "status: Former     Types: Cigars, Cigarettes     Start date: 10/31/2024     Quit date:      Years since quittin.2     Passive exposure: Past    Smokeless tobacco: Never    Tobacco comments:     4-5cigars per day before nowadays smokes about 1 cigar, trying to cut down.   Vaping Use    Vaping status: Never Used   Substance and Sexual Activity    Alcohol use: Not Currently     Comment: last drink may 2023    Drug use: No    Sexual activity: Yes     Partners: Female     Birth control/protection: None        Objective   Ht 5' 6\" (1.676 m)   Wt 86.2 kg (190 lb)   BMI 30.67 kg/m²      Physical Exam  Vascular status is is a faint 1/4 DP PT negative digital hair, normal distal cooling, slightly delayed capillary refill bilaterally.  Capillary refill is approximately 3 to 4 seconds.  Slight edema is noted bilaterally.    Derm nails are brittle elongated hypertrophic yellow-brown discoloration with subungual debris x 10.  There is an increased thickness in the nails of approximately 3 mm with excessive length of approximately 5 mm to 6 mm.  There is yellow scaly tissue present on the plantar aspect of both feet especially in the forefoot area and in the heels.  No signs of any fissures or dried blood present within the tissue.  There is loss of turgor noted bilaterally and thinning of the skin.    Ortho pes planus is noted bilaterally with mild hammertoe deformities present on the fourth and fifth digits.    Neuro is intact and equal bilaterally for 0.5 monofilament and light touch.  The monofilament was tested on the plantar aspect of the hallux, plantar aspect of the third and fourth toes, submet 3, and the plantar aspect of the arch.    Administrative Statements   I have spent a total time of 15 minutes in caring for this patient on the day of the visit/encounter including Risks and benefits of tx options, Instructions for management, Patient and family education, Importance of tx compliance, Risk factor " reductions, Counseling / Coordination of care, Documenting in the medical record, and Obtaining or reviewing history  .

## 2025-03-20 NOTE — PROGRESS NOTES
"Name: Mathew Bird      : 1960      MRN: [id number]  Encounter Provider: Denia Parks PA-C  Encounter Date: 3/21/2025   Encounter department: [facility] [place]  :  Mathew is a 65 y/o male with CAD and PAF on baby aspirin, COPD stage IV and chronic respiratory failure with hypoxia on supplemental oxygen, DM 2, hyperlipidemia who presents for evaluation of Crohn disease.  Assessment & Plan  Crohn's disease of both small and large intestine with intestinal obstruction (HCC)  Patient has a history of Crohn's status post ileocolectomy with anastomosis and ileostomy in 2018.  He was previously following with  and it appears he was diagnosed in 2016 (however he says he is unsure of the diagnosis time)?    He was asked to remain on sulfasalazine, which he says he has been compliant with recently.  He is taking 500 mg 4 times daily and says he is doing very well.  He says that he does get soft to loose bowel movements a few times a week but says that this was \" always the norm\" for him.  He denies any bloody bowel movements, abdominal pain, nausea vomiting, joint pains, skin lesions or rashes, blurry vision, new headaches.  The patient says that his only complaint is that he gets some irritation and soreness in his rectal area at times but he says this usually occurs when he is having more than 1-2 bowel movements a day.  He says he has recently quit tobacco and alcohol use altogether and is doing well maintaining sobriety.    His last abdominal imaging was in November when he and there was concern for small bowel obstruction noted on CT scan. He was treated conservatively with NG tube and the obstruction subsequently resolved which was noted on x-ray.  He has not had any recent blood work since November when his hemoglobin was mildly low around 10.  It also appears he has not undergone a colonoscopy since .  The patient says that he was told during his " "hospitalization that if he is going to undergo a colonoscopy, he should undergo an EGD as well due to his recent small bowel obstruction.    -CBC, CMP, iron panel, vitamin D, vitamin B-12, folate ordered  -EGD and colonoscopy for complete evaluation ordered at this time: this should be done in hospital setting due to his respiratory failure, specifically [place] or [place] if possible   -Continue sulfasalazine 500 mg 4 times daily for now: I explained that this may change in the future depending on results from endoscopic evaluation  -Patient would like to defer stool studies; CRP ordered instead  -IBD immunization: The patient says that he has had \" a rough year\" so he is unsure about his vaccination status in regards to flu, pneumonia, COVID however he says that he will inquire with his PCPs office  -Patient should have follow-up with IBD team following his EGD and colonoscopy    -The patient should likely undergo CTE/MRE to further evaluate the small bowel as the EGD and colonoscopy may not be able to evaluate completely (especially due to her surgical history); patient says he prefers to hold off on this until the EGD and colonoscopy are complete, which is reasonable at this time    -I congratulated the patient on his sobriety from alcohol and abstinence from tobacco use and I explained that this will certainly help not only maintain remission (if he is in endoscopic/histologic remission) but will also help with his underlying COPD and CAD  Orders:    Comprehensive metabolic panel; Future    CBC and differential; Future    Iron Panel (Includes Ferritin, Iron Sat%, Iron, and TIBC); Future    Folate; Future    Vitamin D 25 hydroxy; Future    Vitamin B12; Future    Colonoscopy; Future    EGD; Future    C-reactive protein; Future    polyethylene glycol (GOLYTELY) 4000 mL solution; Take 4,000 mL by mouth once for 1 dose    SBO (small bowel obstruction) (HCC)  See above.   Orders:    EGD; Future        History of " "Present Illness   HPI  Mathew Bird is a 64 y.o. male who presents for follow-up.  He says that he does get soft to loose bowel movements a few times a week but says that this was \" always the norm\" for him.  He denies any bloody bowel movements, abdominal pain, nausea vomiting, joint pains, skin lesions or rashes, blurry vision, new headaches.  The patient says that his only complaint is that he gets some irritation in his rectal area at times but he says this usually occurs when he is having more than 1-2 bowel movements a day.  He says he has recently quit tobacco and alcohol use altogether and is doing well maintaining sobriety.  He is unsure of the exact year of his diagnosis but he says he has only ever been on sulfasalazine.  The patient says that he thinks his bowel movements are a bit darker but is unsure if they are black?  He denies heartburn, unintentional weight loss, fevers, chills, night sweats, NSAID use.  Other than the patient's ex lap and ileostomy closure, he has also had prior appendectomy.  The patient is not on blood thinners.  History obtained from: patient    Review of Systems   Constitutional:  Negative for appetite change, chills, diaphoresis, fatigue, fever and unexpected weight change.   HENT:  Negative for trouble swallowing.    Gastrointestinal:  Positive for rectal pain. Negative for abdominal distention, abdominal pain, anal bleeding, blood in stool, constipation, diarrhea, nausea and vomiting.     Medical History Reviewed by provider this encounter:     .  Past Medical History   Past Medical History:   Diagnosis Date    DEBBY (acute kidney injury) ([facility]) 07/08/2018    Alcohol abuse 05/15/2018    Anxiety     Asthma     Cardiac disease     Cervical stenosis (uterine cervix)     Chest pain     Chronic kidney disease     Colitis     Colon polyps     COPD (chronic obstructive pulmonary disease) ([facility])     2L @ HS and PRN    Coronary artery disease     Diverticulitis     Esophageal reflux "     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HCC)     Heart failure, systolic, due to idiopathic cardiomyopathy (HCC) 05/21/2018    Hyperlipidemia     Hypertension     IBD (inflammatory bowel disease)     Myocardial infarction (HCC)     Old myocardial infarction     Paroxysmal atrial fibrillation (HCC) 08/24/2018    Perforation of colon (HCC)     Pericarditis 12/07/2016    Type 2 diabetes, diet controlled (HCC)     Ulcerative colitis (HCC)     VT (ventricular tachycardia) (HCC) 12/08/2023     Past Surgical History:   Procedure Laterality Date    ANGIOPLASTY      APPENDECTOMY      COLON SURGERY      COLONOSCOPY N/A 10/24/2016    Procedure: COLONOSCOPY;  Surgeon: Tirso Juárez MD;  Location: [facility];  Service:     CORONARY ANGIOPLASTY WITH STENT PLACEMENT      ESOPHAGOGASTRODUODENOSCOPY N/A 10/24/2016    Procedure: ESOPHAGOGASTRODUODENOSCOPY (EGD);  Surgeon: Tirso Juárez MD;  Location: [facility];  Service:     EXPLORATORY LAPAROTOMY W/ BOWEL RESECTION N/A 5/22/2018    Procedure: EXPLORATORY LAPAROTOMY, ILIOCOLECTOMY, ILIOCOLONIC ANASTAMOSIS, LOOP ILIOSTOMY, REPAIR OF SEROSAL TEAR, EXTENSIVE LYSIS OF ADHESIONS, WOUND VAC PLACEMENT;  Surgeon: Tirso Juárez MD;  Location: [facility];  Service: Colorectal    HEMICOLECTOMY      ILEOSTOMY CLOSURE N/A 10/5/2018    Procedure: CLOSURE ILEOSTOMY;  Surgeon: Tirso Juárez MD;  Location: [facility];  Service: Colorectal    OTHER SURGICAL HISTORY      stent indications acute myocardial infarction    MD COLONOSCOPY FLX DX W/COLLJ SPEC WHEN PFRMD N/A 2/6/2018    Procedure: COLONOSCOPY;  Surgeon: Tirso Juárez MD;  Location: [facility];  Service: Colorectal    MD COLONOSCOPY FLX DX W/COLLJ SPEC WHEN PFRMD N/A 10/4/2018    Procedure: COLONOSCOPY;  Surgeon: Tirso Juárez MD;  Location: [facility];  Service: Colorectal    TONSILLECTOMY       Family History   Problem Relation Age of Onset    Coronary artery disease Father     Crohn's disease Father     Stroke Father      Diabetes Family       reports that he quit smoking about 49 years ago. His smoking use included cigars and cigarettes. He started smoking about 4 months ago. He has been exposed to tobacco smoke. He has never used smokeless tobacco. He reports that he does not currently use alcohol. He reports that he does not use drugs.  Current Outpatient Medications   Medication Instructions    albuterol (PROVENTIL HFA,VENTOLIN HFA) 90 mcg/act inhaler INHALE 2 PUFFS BY MOUTH EVERY 4 HOURS AS NEEDED FOR WHEEZE    albuterol 2.5 mg, Nebulization, Every 4 hours PRN    Aspirin Low Dose 81 mg, Oral, Daily, Chew    atorvastatin (LIPITOR) 40 mg, Oral, Daily with dinner    budesonide-formoterol (Symbicort) 160-4.5 mcg/act inhaler 2 puffs, Inhalation, Daily PRN, Rinse mouth after use.    fluticasone-umeclidinium-vilanterol (Trelegy Ellipta) 200-62.5-25 mcg/actuation AEPB inhaler 1 puff, Inhalation, Daily, Rinse mouth after use.    ipratropium-albuterol (DUO-NEB) 0.5-2.5 mg/3 mL nebulizer solution 3 mL, Nebulization, 3 times daily    metFORMIN (GLUCOPHAGE-XR) 750 mg, Oral, Daily with breakfast    metoprolol succinate (TOPROL-XL) 25 mg, Oral, Daily at bedtime, Daily at bedtime    Multiple Vitamins-Minerals (Centrum Silver 50+Men) TABS Take by mouth    Nebulizers (AERONEB GO NEBULIZER HANDSET) MISC Does not apply, 2 times daily PRN    nicotine (NICODERM CQ) 21 mg/24 hr TD 24 hr patch 1 patch, Transdermal, Daily    pantoprazole (PROTONIX) 40 mg, Oral, Daily    polyethylene glycol (GOLYTELY) 4000 mL solution 4,000 mL, Oral, Once    sulfaSALAzine (AZULFIDINE) 500 mg, Oral, 4 times daily    traZODone (DESYREL) 50 mg, Oral, Daily at bedtime    varenicline (CHANTIX) 1 mg, Oral, 2 times daily     Allergies   Allergen Reactions    Medical Tape     Other      Brown cloth band aids       Latex Rash      Current Outpatient Medications on File Prior to Visit   Medication Sig Dispense Refill    albuterol (2.5 mg/3 mL) 0.083 % nebulizer solution Take 3 mL  (2.5 mg total) by nebulization every 4 (four) hours as needed for wheezing or shortness of breath 375 mL 5    albuterol (PROVENTIL HFA,VENTOLIN HFA) 90 mcg/act inhaler INHALE 2 PUFFS BY MOUTH EVERY 4 HOURS AS NEEDED FOR WHEEZE 18 g 5    aspirin (Aspirin Low Dose) 81 mg chewable tablet CHEW 1 TABLET BY MOUTH EVERY DAY 90 tablet 1    atorvastatin (LIPITOR) 40 mg tablet TAKE 1 TABLET BY MOUTH EVERY DAY WITH DINNER 90 tablet 1    budesonide-formoterol (Symbicort) 160-4.5 mcg/act inhaler Inhale 2 puffs daily as needed (for shortness of breath) Rinse mouth after use. 91.8 g 3    fluticasone-umeclidinium-vilanterol (Trelegy Ellipta) 200-62.5-25 mcg/actuation AEPB inhaler Inhale 1 puff daily Rinse mouth after use. 180 blister 0    ipratropium-albuterol (DUO-NEB) 0.5-2.5 mg/3 mL nebulizer solution Take 3 mL by nebulization 3 (three) times a day 810 mL 2    metFORMIN (GLUCOPHAGE-XR) 750 mg 24 hr tablet Take 1 tablet (750 mg total) by mouth daily with breakfast 100 tablet 3    metoprolol succinate (TOPROL-XL) 25 mg 24 hr tablet TAKE 1 TABLET (25 MG TOTAL) BY MOUTH DAILY AT BEDTIME DAILY AT BEDTIME 90 tablet 1    Multiple Vitamins-Minerals (Centrum Silver 50+Men) TABS Take by mouth      Nebulizers (AERONEB GO NEBULIZER HANDSET) MISC by Does not apply route 2 (two) times a day as needed (wheezing) 1 each 0    nicotine (NICODERM CQ) 21 mg/24 hr TD 24 hr patch PLACE 1 PATCH ON THE SKIN OVER 24 HOURS DAILY 28 patch 0    pantoprazole (PROTONIX) 40 mg tablet TAKE 1 TABLET BY MOUTH EVERY DAY 90 tablet 1    sulfaSALAzine (AZULFIDINE) 500 mg tablet TAKE 1 TABLET (500 MG TOTAL) BY MOUTH 4 (FOUR) TIMES A DAY. 360 tablet 0    traZODone (DESYREL) 50 mg tablet TAKE 1 TABLET BY MOUTH DAILY AT BEDTIME 90 tablet 2    varenicline (CHANTIX) 1 mg tablet TAKE 1 TABLET BY MOUTH TWICE A  tablet 2     No current facility-administered medications on file prior to visit.      Social History     Tobacco Use    Smoking status: Former     Types:  Cigars, Cigarettes     Start date: 10/31/2024     Quit date:      Years since quittin.2     Passive exposure: Past    Smokeless tobacco: Never    Tobacco comments:     4-5cigars per day before nowadays smokes about 1 cigar, trying to cut down.   Vaping Use    Vaping status: Never Used   Substance and Sexual Activity    Alcohol use: Not Currently     Comment: last drink may 2023    Drug use: No    Sexual activity: Yes     Partners: Female     Birth control/protection: None        Objective   There were no vitals taken for this visit.     Physical Exam  Constitutional:       Appearance: Normal appearance.   Abdominal:      General: Abdomen is flat. Bowel sounds are normal. There is no distension.      Palpations: Abdomen is soft. There is no mass.      Tenderness: There is no abdominal tenderness. There is no right CVA tenderness, left CVA tenderness, guarding or rebound.   Neurological:      Mental Status: He is alert.         Administrative Statements   I have spent a total time of 30 minutes in caring for this patient on the day of the visit/encounter including Diagnostic results, Prognosis, Risks and benefits of tx options, Instructions for management, Patient and family education, Importance of tx compliance, Risk factor reductions, Impressions, Counseling / Coordination of care, Documenting in the medical record, Reviewing/placing orders in the medical record (including tests, medications, and/or procedures), Obtaining or reviewing history  , and Communicating with other healthcare professionals .

## 2025-03-20 NOTE — ASSESSMENT & PLAN NOTE
"Patient has a history of Crohn's status post ileocolectomy with anastomosis and ileostomy in 2018.  He was previously following with  and it appears he was diagnosed in 2016 (however he says he is unsure of the diagnosis time)?    He was asked to remain on sulfasalazine, which he says he has been compliant with recently.  He is taking 500 mg 4 times daily and says he is doing very well.  He says that he does get soft to loose bowel movements a few times a week but says that this was \" always the norm\" for him.  He denies any bloody bowel movements, abdominal pain, nausea vomiting, joint pains, skin lesions or rashes, blurry vision, new headaches.  The patient says that his only complaint is that he gets some irritation and soreness in his rectal area at times but he says this usually occurs when he is having more than 1-2 bowel movements a day.  He says he has recently quit tobacco and alcohol use altogether and is doing well maintaining sobriety.    His last abdominal imaging was in November when he and there was concern for small bowel obstruction noted on CT scan. He was treated conservatively with NG tube and the obstruction subsequently resolved which was noted on x-ray.  He has not had any recent blood work since November when his hemoglobin was mildly low around 10.  It also appears he has not undergone a colonoscopy since 2016.  The patient says that he was told during his hospitalization that if he is going to undergo a colonoscopy, he should undergo an EGD as well due to his recent small bowel obstruction.    -CBC, CMP, iron panel, vitamin D, vitamin B-12, folate ordered  -EGD and colonoscopy for complete evaluation ordered at this time: this should be done in hospital setting due to his respiratory failure, specifically [place] or [place] if possible   -Continue sulfasalazine 500 mg 4 times daily for now: I explained that this may change in the future depending on results from endoscopic " "evaluation  -Patient would like to defer stool studies; CRP ordered instead  -IBD immunization: The patient says that he has had \" a rough year\" so he is unsure about his vaccination status in regards to flu, pneumonia, COVID however he says that he will inquire with his PCPs office  -Patient should have follow-up with IBD team following his EGD and colonoscopy    -The patient should likely undergo CTE/MRE to further evaluate the small bowel as the EGD and colonoscopy may not be able to evaluate completely (especially due to her surgical history); patient says he prefers to hold off on this until the EGD and colonoscopy are complete, which is reasonable at this time    -I congratulated the patient on his sobriety from alcohol and abstinence from tobacco use and I explained that this will certainly help not only maintain remission (if he is in endoscopic/histologic remission) but will also help with his underlying COPD and CAD  Orders:    Comprehensive metabolic panel; Future    CBC and differential; Future    Iron Panel (Includes Ferritin, Iron Sat%, Iron, and TIBC); Future    Folate; Future    Vitamin D 25 hydroxy; Future    Vitamin B12; Future    Colonoscopy; Future    EGD; Future    C-reactive protein; Future    polyethylene glycol (GOLYTELY) 4000 mL solution; Take 4,000 mL by mouth once for 1 dose    "

## 2025-03-21 ENCOUNTER — OFFICE VISIT (OUTPATIENT)
Dept: GASTROENTEROLOGY | Facility: CLINIC | Age: 65
End: 2025-03-21

## 2025-03-21 VITALS
BODY MASS INDEX: 30.53 KG/M2 | HEIGHT: 66 IN | SYSTOLIC BLOOD PRESSURE: 124 MMHG | TEMPERATURE: 99.2 F | WEIGHT: 190 LBS | DIASTOLIC BLOOD PRESSURE: 74 MMHG

## 2025-03-21 DIAGNOSIS — K56.609 SBO (SMALL BOWEL OBSTRUCTION) (HCC): ICD-10-CM

## 2025-03-21 DIAGNOSIS — K50.812 CROHN'S DISEASE OF BOTH SMALL AND LARGE INTESTINE WITH INTESTINAL OBSTRUCTION (HCC): Primary | ICD-10-CM

## 2025-03-21 RX ORDER — SODIUM CHLORIDE, SODIUM LACTATE, POTASSIUM CHLORIDE, CALCIUM CHLORIDE 600; 310; 30; 20 MG/100ML; MG/100ML; MG/100ML; MG/100ML
125 INJECTION, SOLUTION INTRAVENOUS CONTINUOUS
OUTPATIENT
Start: 2025-03-21

## 2025-03-21 NOTE — PATIENT INSTRUCTIONS
Scheduled date of EGD/colonoscopy (as of today):  Physician performing EGD/colonoscopy:  Location of EGD/colonoscopy:  Desired bowel prep reviewed with patient:  Instructions reviewed with patient by:  Clearances:  NONE    MUST BE HOSPITAL SETTING DUE TO BEING ON OXYGEN    Patient was instructed to have labs done     Patient will call and schedule procedures himself as well as his after visit which will need to be with IBD in three months  after the procedure    Patient is Diabetic and was given instructions

## 2025-03-24 DIAGNOSIS — F17.210 CIGARETTE NICOTINE DEPENDENCE WITHOUT COMPLICATION: ICD-10-CM

## 2025-03-25 RX ORDER — NICOTINE 21 MG/24HR
1 PATCH, TRANSDERMAL 24 HOURS TRANSDERMAL DAILY
Qty: 28 PATCH | Refills: 0 | Status: SHIPPED | OUTPATIENT
Start: 2025-03-25

## 2025-04-05 DIAGNOSIS — R00.0 TACHYCARDIA: ICD-10-CM

## 2025-04-05 DIAGNOSIS — I25.10 CORONARY ARTERY DISEASE INVOLVING NATIVE CORONARY ARTERY OF NATIVE HEART WITHOUT ANGINA PECTORIS: ICD-10-CM

## 2025-04-09 ENCOUNTER — OFFICE VISIT (OUTPATIENT)
Dept: PULMONOLOGY | Facility: CLINIC | Age: 65
End: 2025-04-09
Payer: COMMERCIAL

## 2025-04-09 ENCOUNTER — APPOINTMENT (OUTPATIENT)
Dept: LAB | Facility: CLINIC | Age: 65
End: 2025-04-09
Payer: COMMERCIAL

## 2025-04-09 VITALS
BODY MASS INDEX: 31.15 KG/M2 | OXYGEN SATURATION: 98 % | HEIGHT: 66 IN | TEMPERATURE: 97.8 F | DIASTOLIC BLOOD PRESSURE: 68 MMHG | SYSTOLIC BLOOD PRESSURE: 120 MMHG | WEIGHT: 193.8 LBS | HEART RATE: 91 BPM

## 2025-04-09 DIAGNOSIS — J44.9 STAGE 4 VERY SEVERE COPD BY GOLD CLASSIFICATION (HCC): ICD-10-CM

## 2025-04-09 DIAGNOSIS — R91.8 MULTIPLE PULMONARY NODULES: ICD-10-CM

## 2025-04-09 DIAGNOSIS — E11.65 TYPE 2 DIABETES MELLITUS WITH HYPERGLYCEMIA, WITHOUT LONG-TERM CURRENT USE OF INSULIN (HCC): ICD-10-CM

## 2025-04-09 DIAGNOSIS — E11.9 TYPE 2 DIABETES MELLITUS WITHOUT COMPLICATION, WITHOUT LONG-TERM CURRENT USE OF INSULIN (HCC): ICD-10-CM

## 2025-04-09 DIAGNOSIS — E11.69 TYPE 2 DIABETES MELLITUS WITH OTHER SPECIFIED COMPLICATION, WITHOUT LONG-TERM CURRENT USE OF INSULIN (HCC): ICD-10-CM

## 2025-04-09 DIAGNOSIS — K50.812 CROHN'S DISEASE OF BOTH SMALL AND LARGE INTESTINE WITH INTESTINAL OBSTRUCTION (HCC): ICD-10-CM

## 2025-04-09 DIAGNOSIS — J44.9 STAGE 4 VERY SEVERE COPD BY GOLD CLASSIFICATION (HCC): Primary | ICD-10-CM

## 2025-04-09 DIAGNOSIS — J96.11 CHRONIC RESPIRATORY FAILURE WITH HYPOXIA (HCC): ICD-10-CM

## 2025-04-09 LAB
25(OH)D3 SERPL-MCNC: 30.8 NG/ML (ref 30–100)
ALBUMIN SERPL BCG-MCNC: 4.3 G/DL (ref 3.5–5)
ALP SERPL-CCNC: 72 U/L (ref 34–104)
ALT SERPL W P-5'-P-CCNC: 13 U/L (ref 7–52)
ANION GAP SERPL CALCULATED.3IONS-SCNC: 9 MMOL/L (ref 4–13)
AST SERPL W P-5'-P-CCNC: 12 U/L (ref 13–39)
BASOPHILS # BLD AUTO: 0.04 THOUSANDS/ÂΜL (ref 0–0.1)
BASOPHILS NFR BLD AUTO: 0 % (ref 0–1)
BILIRUB SERPL-MCNC: 0.4 MG/DL (ref 0.2–1)
BUN SERPL-MCNC: 15 MG/DL (ref 5–25)
CALCIUM SERPL-MCNC: 9.1 MG/DL (ref 8.4–10.2)
CHLORIDE SERPL-SCNC: 110 MMOL/L (ref 96–108)
CHOLEST SERPL-MCNC: 85 MG/DL (ref ?–200)
CO2 SERPL-SCNC: 26 MMOL/L (ref 21–32)
CREAT SERPL-MCNC: 1.43 MG/DL (ref 0.6–1.3)
CREAT UR-MCNC: 172.3 MG/DL
CRP SERPL QL: 1.6 MG/L
EOSINOPHIL # BLD AUTO: 0.09 THOUSAND/ÂΜL (ref 0–0.61)
EOSINOPHIL NFR BLD AUTO: 1 % (ref 0–6)
ERYTHROCYTE [DISTWIDTH] IN BLOOD BY AUTOMATED COUNT: 15.4 % (ref 11.6–15.1)
EST. AVERAGE GLUCOSE BLD GHB EST-MCNC: 128 MG/DL
FERRITIN SERPL-MCNC: 3 NG/ML (ref 30–336)
FOLATE SERPL-MCNC: 12.8 NG/ML
GFR SERPL CREATININE-BSD FRML MDRD: 52 ML/MIN/1.73SQ M
GLUCOSE P FAST SERPL-MCNC: 111 MG/DL (ref 65–99)
HBA1C MFR BLD: 6.1 %
HCT VFR BLD AUTO: 34.9 % (ref 36.5–49.3)
HDLC SERPL-MCNC: 47 MG/DL
HGB BLD-MCNC: 10.6 G/DL (ref 12–17)
IMM GRANULOCYTES # BLD AUTO: 0.05 THOUSAND/UL (ref 0–0.2)
IMM GRANULOCYTES NFR BLD AUTO: 1 % (ref 0–2)
LDLC SERPL CALC-MCNC: 21 MG/DL (ref 0–100)
LYMPHOCYTES # BLD AUTO: 1.62 THOUSANDS/ÂΜL (ref 0.6–4.47)
LYMPHOCYTES NFR BLD AUTO: 15 % (ref 14–44)
MCH RBC QN AUTO: 27 PG (ref 26.8–34.3)
MCHC RBC AUTO-ENTMCNC: 30.4 G/DL (ref 31.4–37.4)
MCV RBC AUTO: 89 FL (ref 82–98)
MICROALBUMIN UR-MCNC: 25.6 MG/L
MICROALBUMIN/CREAT 24H UR: 15 MG/G CREATININE (ref 0–30)
MONOCYTES # BLD AUTO: 0.88 THOUSAND/ÂΜL (ref 0.17–1.22)
MONOCYTES NFR BLD AUTO: 8 % (ref 4–12)
NEUTROPHILS # BLD AUTO: 7.98 THOUSANDS/ÂΜL (ref 1.85–7.62)
NEUTS SEG NFR BLD AUTO: 75 % (ref 43–75)
NRBC BLD AUTO-RTO: 0 /100 WBCS
PLATELET # BLD AUTO: 207 THOUSANDS/UL (ref 149–390)
PMV BLD AUTO: 11.4 FL (ref 8.9–12.7)
POTASSIUM SERPL-SCNC: 4.9 MMOL/L (ref 3.5–5.3)
PROT SERPL-MCNC: 7 G/DL (ref 6.4–8.4)
RBC # BLD AUTO: 3.93 MILLION/UL (ref 3.88–5.62)
SODIUM SERPL-SCNC: 145 MMOL/L (ref 135–147)
TRIGL SERPL-MCNC: 87 MG/DL (ref ?–150)
TSH SERPL DL<=0.05 MIU/L-ACNC: 2.88 UIU/ML (ref 0.45–4.5)
VIT B12 SERPL-MCNC: 191 PG/ML (ref 180–914)
WBC # BLD AUTO: 10.66 THOUSAND/UL (ref 4.31–10.16)

## 2025-04-09 PROCEDURE — 82570 ASSAY OF URINE CREATININE: CPT

## 2025-04-09 PROCEDURE — 82746 ASSAY OF FOLIC ACID SERUM: CPT

## 2025-04-09 PROCEDURE — 83540 ASSAY OF IRON: CPT

## 2025-04-09 PROCEDURE — 83550 IRON BINDING TEST: CPT

## 2025-04-09 PROCEDURE — 83036 HEMOGLOBIN GLYCOSYLATED A1C: CPT

## 2025-04-09 PROCEDURE — 82103 ALPHA-1-ANTITRYPSIN TOTAL: CPT

## 2025-04-09 PROCEDURE — 84443 ASSAY THYROID STIM HORMONE: CPT

## 2025-04-09 PROCEDURE — 86140 C-REACTIVE PROTEIN: CPT

## 2025-04-09 PROCEDURE — 82728 ASSAY OF FERRITIN: CPT

## 2025-04-09 PROCEDURE — 36415 COLL VENOUS BLD VENIPUNCTURE: CPT

## 2025-04-09 PROCEDURE — 80053 COMPREHEN METABOLIC PANEL: CPT

## 2025-04-09 PROCEDURE — 99214 OFFICE O/P EST MOD 30 MIN: CPT | Performed by: INTERNAL MEDICINE

## 2025-04-09 PROCEDURE — 82306 VITAMIN D 25 HYDROXY: CPT

## 2025-04-09 PROCEDURE — 82043 UR ALBUMIN QUANTITATIVE: CPT

## 2025-04-09 PROCEDURE — 82607 VITAMIN B-12: CPT

## 2025-04-09 PROCEDURE — 80061 LIPID PANEL: CPT

## 2025-04-09 RX ORDER — METOPROLOL SUCCINATE 25 MG/1
25 TABLET, EXTENDED RELEASE ORAL
Qty: 90 TABLET | Refills: 1 | Status: SHIPPED | OUTPATIENT
Start: 2025-04-09

## 2025-04-09 RX ORDER — FLUTICASONE FUROATE, UMECLIDINIUM BROMIDE AND VILANTEROL TRIFENATATE 200; 62.5; 25 UG/1; UG/1; UG/1
1 POWDER RESPIRATORY (INHALATION) DAILY
Qty: 180 BLISTER | Refills: 1 | Status: SHIPPED | OUTPATIENT
Start: 2025-04-09

## 2025-04-09 NOTE — ASSESSMENT & PLAN NOTE
Severe COPD with FEV1 0.69 L (22% predicted)    No recent exacerbations. Has quit smoking and encouraged continued cessation.     Maintained on Trelegy with Symbicort PRN and Duonebs TID. Continue current regimen.     Given smoking cessation will get CT chest with [place] protocol to evaluate for endobronchial valves.  Also ordering PFTs with ABG as well as alpha-1 antitrypsin levels

## 2025-04-09 NOTE — PROGRESS NOTES
Pulmonary Follow Up Note   Mathew Bird 64 y.o. male MRN: [id number]  4/9/2025      Assessment/Plan:    Stage 4 very severe COPD by GOLD classification ([facility])  Severe COPD with FEV1 0.69 L (22% predicted)    No recent exacerbations. Has quit smoking and encouraged continued cessation.     Maintained on Trelegy with Symbicort PRN and Duonebs TID. Continue current regimen.     Given smoking cessation will get CT chest with [place] protocol to evaluate for endobronchial valves.  Also ordering PFTs with ABG as well as alpha-1 antitrypsin levels    Multiple pulmonary nodules  Noted to have multiple ground glass nodules on CT Chest 9/2024     Repeat CT chest ordered    Chronic respiratory failure with hypoxia (HCC)  Secondary to severe COPD, stable on 4 L nasal cannula.  Will continue oxygen at current levels.      Orders:    Diagnoses and all orders for this visit:    Stage 4 very severe COPD by GOLD classification ([facility])  -     fluticasone-umeclidinium-vilanterol (Trelegy Ellipta) 200-62.5-25 mcg/actuation AEPB inhaler; Inhale 1 puff daily Rinse mouth after use.  -     CT chest without contrast; Future  -     Complete PFT with Post Bronchodilator and ABG; Future  -     Alpha-1-antitrypsin; Future    Multiple pulmonary nodules  -     Cancel: CT chest without contrast; Future  -     CT chest without contrast; Future    Chronic respiratory failure with hypoxia (HCC)  -     Complete PFT with Post Bronchodilator and ABG; Future        Return in about 4 months (around 8/9/2025) for COPD.    History of Present Illness   HPI:  Mathew Bird is a 64 y.o. male who presents for follow-up of COPD as well as multiple pulmonary nodules.  He is doing okay today and denies any recent exacerbation of COPD.  Is not having any worsening shortness of breath.  He has noticed some increased congestion with this time of year.  He says he has some congestion in the morning when he first wakes up but that it resolves after coughing a bit.   He will occasionally take allergy medicine but has not needed to do that yet.  He denies any hemoptysis or weight loss.  He says he is actually gained a little weight since he quit smoking.  He is still using Chantix as well as nicotine patches but is working on stopping the Chantix currently.    Discussed plan for CT chest to evaluate pulmonary nodules and discussed that if anything were abnormal he would need further workup which she was agreeable to.    He is still using his Trelegy daily and using his Symbicort up to twice a day as needed.  He also occasionally takes the albuterol as needed.  He feels like overall he is doing well and would like to continue on current regimen.    Review of Systems   Constitutional:  Negative for appetite change, chills, fatigue and unexpected weight change.   HENT:  Positive for congestion and sneezing.    Respiratory:  Positive for cough. Negative for chest tightness, shortness of breath and wheezing.    Cardiovascular:  Negative for chest pain, palpitations and leg swelling.   Psychiatric/Behavioral:  Positive for sleep disturbance.        Historical Information   Past Medical History:   Diagnosis Date    DEBBY (acute kidney injury) ([facility]) 07/08/2018    Alcohol abuse 05/15/2018    Anxiety     Asthma     Cardiac disease     Cervical stenosis (uterine cervix)     Chest pain     Chronic kidney disease     Colitis     Colon polyps     COPD (chronic obstructive pulmonary disease) ([facility])     2L @ HS and PRN    Coronary artery disease     Diverticulitis     Esophageal reflux     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[facility])     Heart failure, systolic, due to idiopathic cardiomyopathy ([facility]) 05/21/2018    Hyperlipidemia     Hypertension     IBD (inflammatory bowel disease)     Myocardial infarction ([facility])     Old myocardial infarction     Paroxysmal atrial fibrillation ([facility]) 08/24/2018    Perforation of colon ([facility])     Pericarditis 12/07/2016    Type 2 diabetes, diet controlled ([facility])     Ulcerative  colitis (HCC)     VT (ventricular tachycardia) (HCC) 12/08/2023     Past Surgical History:   Procedure Laterality Date    ANGIOPLASTY      APPENDECTOMY      COLON SURGERY      COLONOSCOPY N/A 10/24/2016    Procedure: COLONOSCOPY;  Surgeon: Tirso Juárez MD;  Location: [facility];  Service:     CORONARY ANGIOPLASTY WITH STENT PLACEMENT      ESOPHAGOGASTRODUODENOSCOPY N/A 10/24/2016    Procedure: ESOPHAGOGASTRODUODENOSCOPY (EGD);  Surgeon: Tirso Juárez MD;  Location: [facility];  Service:     EXPLORATORY LAPAROTOMY W/ BOWEL RESECTION N/A 5/22/2018    Procedure: EXPLORATORY LAPAROTOMY, ILIOCOLECTOMY, ILIOCOLONIC ANASTAMOSIS, LOOP ILIOSTOMY, REPAIR OF SEROSAL TEAR, EXTENSIVE LYSIS OF ADHESIONS, WOUND VAC PLACEMENT;  Surgeon: Tirso Juárez MD;  Location: [facility];  Service: Colorectal    HEMICOLECTOMY      ILEOSTOMY CLOSURE N/A 10/5/2018    Procedure: CLOSURE ILEOSTOMY;  Surgeon: Tirso Juárez MD;  Location: [facility];  Service: Colorectal    OTHER SURGICAL HISTORY      stent indications acute myocardial infarction    MD COLONOSCOPY FLX DX W/COLLJ SPEC WHEN PFRMD N/A 2/6/2018    Procedure: COLONOSCOPY;  Surgeon: Tirso Juárez MD;  Location: [facility];  Service: Colorectal    MD COLONOSCOPY FLX DX W/COLLJ SPEC WHEN PFRMD N/A 10/4/2018    Procedure: COLONOSCOPY;  Surgeon: Tirso Juárez MD;  Location: [facility];  Service: Colorectal    TONSILLECTOMY       Family History   Problem Relation Age of Onset    Coronary artery disease Father     Crohn's disease Father     Stroke Father     Diabetes Family          Meds/Allergies     Current Outpatient Medications:     albuterol (2.5 mg/3 mL) 0.083 % nebulizer solution, Take 3 mL (2.5 mg total) by nebulization every 4 (four) hours as needed for wheezing or shortness of breath, Disp: 375 mL, Rfl: 5    albuterol (PROVENTIL HFA,VENTOLIN HFA) 90 mcg/act inhaler, INHALE 2 PUFFS BY MOUTH EVERY 4 HOURS AS NEEDED FOR WHEEZE, Disp: 18 g, Rfl: 5    aspirin  (Aspirin Low Dose) 81 mg chewable tablet, CHEW 1 TABLET BY MOUTH EVERY DAY, Disp: 90 tablet, Rfl: 1    atorvastatin (LIPITOR) 40 mg tablet, TAKE 1 TABLET BY MOUTH EVERY DAY WITH DINNER, Disp: 90 tablet, Rfl: 1    budesonide-formoterol (Symbicort) 160-4.5 mcg/act inhaler, Inhale 2 puffs daily as needed (for shortness of breath) Rinse mouth after use., Disp: 91.8 g, Rfl: 3    fluticasone-umeclidinium-vilanterol (Trelegy Ellipta) 200-62.5-25 mcg/actuation AEPB inhaler, Inhale 1 puff daily Rinse mouth after use., Disp: 180 blister, Rfl: 1    ipratropium-albuterol (DUO-NEB) 0.5-2.5 mg/3 mL nebulizer solution, Take 3 mL by nebulization 3 (three) times a day, Disp: 810 mL, Rfl: 2    metFORMIN (GLUCOPHAGE-XR) 750 mg 24 hr tablet, Take 1 tablet (750 mg total) by mouth daily with breakfast, Disp: 100 tablet, Rfl: 3    metoprolol succinate (TOPROL-XL) 25 mg 24 hr tablet, TAKE 1 TABLET (25 MG TOTAL) BY MOUTH DAILY AT BEDTIME DAILY AT BEDTIME, Disp: 90 tablet, Rfl: 1    Multiple Vitamins-Minerals (Centrum Silver 50+Men) TABS, Take by mouth, Disp: , Rfl:     Nebulizers (AERONEB GO NEBULIZER HANDSET) MISC, by Does not apply route 2 (two) times a day as needed (wheezing), Disp: 1 each, Rfl: 0    nicotine (NICODERM CQ) 21 mg/24 hr TD 24 hr patch, PLACE 1 PATCH ON THE SKIN OVER 24 HOURS DAILY, Disp: 28 patch, Rfl: 0    pantoprazole (PROTONIX) 40 mg tablet, TAKE 1 TABLET BY MOUTH EVERY DAY, Disp: 90 tablet, Rfl: 1    sulfaSALAzine (AZULFIDINE) 500 mg tablet, TAKE 1 TABLET (500 MG TOTAL) BY MOUTH 4 (FOUR) TIMES A DAY., Disp: 360 tablet, Rfl: 0    traZODone (DESYREL) 50 mg tablet, TAKE 1 TABLET BY MOUTH DAILY AT BEDTIME, Disp: 90 tablet, Rfl: 2    varenicline (CHANTIX) 1 mg tablet, TAKE 1 TABLET BY MOUTH TWICE A DAY, Disp: 180 tablet, Rfl: 2    polyethylene glycol (GOLYTELY) 4000 mL solution, Take 4,000 mL by mouth once for 1 dose, Disp: 4000 mL, Rfl: 0  Allergies   Allergen Reactions    Medical Tape     Other      Brown cloth band aids  "      Latex Rash       Vitals: Blood pressure 120/68, pulse 91, temperature 97.8 °F (36.6 °C), temperature source Tympanic Core, height 5' 6\" (1.676 m), weight 87.9 kg (193 lb 12.8 oz), SpO2 98%. Body mass index is 31.28 kg/m². Oxygen Therapy  SpO2: 98 %  Oxygen Therapy: Supplemental oxygen  O2 Delivery Method: Nasal cannula  O2 Flow Rate (L/min): 4 L/min      Physical Exam  Physical Exam  Vitals reviewed.   Constitutional:       General: He is not in acute distress.     Appearance: He is not toxic-appearing.   Cardiovascular:      Rate and Rhythm: Normal rate and regular rhythm.   Pulmonary:      Effort: Pulmonary effort is normal. No respiratory distress.      Breath sounds: No wheezing or rales.      Comments: Globally diminished breath sounds but no focal crackles or wheezing  Musculoskeletal:      Right lower leg: No edema.      Left lower leg: No edema.   Neurological:      General: No focal deficit present.      Mental Status: He is alert and oriented to person, place, and time. Mental status is at baseline.         Labs: I have personally reviewed pertinent lab results.  Lab Results   Component Value Date    WBC 7.11 11/04/2024    HGB 10.4 (L) 11/04/2024    HCT 34.3 (L) 11/04/2024    MCV 83 11/04/2024     11/04/2024     Lab Results   Component Value Date    GLUCOSE 92 07/08/2018    CALCIUM 7.7 (L) 11/04/2024     05/29/2015    K 3.7 11/04/2024    CO2 30 11/04/2024     11/04/2024    BUN 13 11/04/2024    CREATININE 1.03 11/04/2024     No results found for: \"IGE\"  Lab Results   Component Value Date    ALT 12 11/02/2024    AST 13 11/02/2024    ALKPHOS 81 11/02/2024    BILITOT 0.58 05/21/2015           Imaging and other studies: Results Review Statement: I personally reviewed the following image studies in PACS and associated radiology reports: CT chest. My interpretation of the radiology images/reports is: multiple pulmonary nodules.    Pulmonary function testing: Spirometry 9/2024 showing " FEV1/FVC: 27%, FEV1 0.69 L (22%), consistent with severe obstruction

## 2025-04-10 ENCOUNTER — RESULTS FOLLOW-UP (OUTPATIENT)
Dept: GASTROENTEROLOGY | Facility: CLINIC | Age: 65
End: 2025-04-10

## 2025-04-10 LAB
A1AT SERPL-MCNC: 177 MG/DL (ref 101–187)
IRON SATN MFR SERPL: 9 % (ref 15–50)
IRON SERPL-MCNC: 43 UG/DL (ref 50–212)
TIBC SERPL-MCNC: 488.6 UG/DL (ref 250–450)
TRANSFERRIN SERPL-MCNC: 349 MG/DL (ref 203–362)
UIBC SERPL-MCNC: 446 UG/DL (ref 155–355)

## 2025-04-11 ENCOUNTER — RESULTS FOLLOW-UP (OUTPATIENT)
Dept: FAMILY MEDICINE CLINIC | Facility: CLINIC | Age: 65
End: 2025-04-11

## 2025-04-19 DIAGNOSIS — F17.200 NICOTINE DEPENDENCE, UNCOMPLICATED, UNSPECIFIED NICOTINE PRODUCT TYPE: ICD-10-CM

## 2025-04-21 RX ORDER — VARENICLINE TARTRATE 1 MG/1
1 TABLET, FILM COATED ORAL 2 TIMES DAILY
Qty: 180 TABLET | Refills: 3 | Status: SHIPPED | OUTPATIENT
Start: 2025-04-21

## 2025-04-21 NOTE — TELEPHONE ENCOUNTER
Requested medication(s) are due for refill today: Yes  Patient has already received a courtesy refill: No  Other reason request has been forwarded to provider: PER PROTOCOL   Propranolol Pregnancy And Lactation Text: This medication is Pregnancy Category C and it isn't known if it is safe during pregnancy. It is excreted in breast milk.

## 2025-04-28 DIAGNOSIS — I25.10 CORONARY ARTERY DISEASE INVOLVING NATIVE CORONARY ARTERY OF NATIVE HEART WITHOUT ANGINA PECTORIS: ICD-10-CM

## 2025-05-06 RX ORDER — ATORVASTATIN CALCIUM 40 MG/1
40 TABLET, FILM COATED ORAL
Qty: 90 TABLET | Refills: 1 | Status: SHIPPED | OUTPATIENT
Start: 2025-05-06

## 2025-05-10 DIAGNOSIS — K50.918 CROHN'S DISEASE WITH OTHER COMPLICATION, UNSPECIFIED GASTROINTESTINAL TRACT LOCATION (HCC): ICD-10-CM

## 2025-05-12 DIAGNOSIS — K50.918 CROHN'S DISEASE WITH OTHER COMPLICATION, UNSPECIFIED GASTROINTESTINAL TRACT LOCATION (HCC): ICD-10-CM

## 2025-05-12 DIAGNOSIS — J44.9 STAGE 4 VERY SEVERE COPD BY GOLD CLASSIFICATION (HCC): ICD-10-CM

## 2025-05-12 RX ORDER — SULFASALAZINE 500 MG/1
500 TABLET ORAL 4 TIMES DAILY
Qty: 360 TABLET | Refills: 0 | OUTPATIENT
Start: 2025-05-12 | End: 2025-08-10

## 2025-05-12 RX ORDER — ALBUTEROL SULFATE 90 UG/1
2 INHALANT RESPIRATORY (INHALATION) EVERY 4 HOURS PRN
Qty: 18 G | Refills: 5 | Status: SHIPPED | OUTPATIENT
Start: 2025-05-12

## 2025-05-12 RX ORDER — BUDESONIDE AND FORMOTEROL FUMARATE DIHYDRATE 160; 4.5 UG/1; UG/1
2 AEROSOL RESPIRATORY (INHALATION) DAILY PRN
Qty: 91.8 G | Refills: 1 | Status: SHIPPED | OUTPATIENT
Start: 2025-05-12

## 2025-05-12 RX ORDER — SULFASALAZINE 500 MG/1
500 TABLET ORAL 4 TIMES DAILY
Qty: 360 TABLET | Refills: 0 | Status: SHIPPED | OUTPATIENT
Start: 2025-05-12 | End: 2025-08-10

## 2025-06-03 ENCOUNTER — TELEPHONE (OUTPATIENT)
Age: 65
End: 2025-06-03

## 2025-06-03 ENCOUNTER — OFFICE VISIT (OUTPATIENT)
Dept: CARDIOLOGY CLINIC | Facility: CLINIC | Age: 65
End: 2025-06-03
Payer: COMMERCIAL

## 2025-06-03 VITALS
BODY MASS INDEX: 30.86 KG/M2 | WEIGHT: 192 LBS | HEIGHT: 66 IN | HEART RATE: 100 BPM | SYSTOLIC BLOOD PRESSURE: 136 MMHG | OXYGEN SATURATION: 97 % | DIASTOLIC BLOOD PRESSURE: 68 MMHG

## 2025-06-03 DIAGNOSIS — E78.5 DYSLIPIDEMIA: ICD-10-CM

## 2025-06-03 DIAGNOSIS — I25.10 CORONARY ARTERY DISEASE INVOLVING NATIVE CORONARY ARTERY OF NATIVE HEART WITHOUT ANGINA PECTORIS: Primary | ICD-10-CM

## 2025-06-03 DIAGNOSIS — I47.20 VENTRICULAR TACHYCARDIA (HCC): ICD-10-CM

## 2025-06-03 DIAGNOSIS — I48.0 PAROXYSMAL ATRIAL FIBRILLATION (HCC): ICD-10-CM

## 2025-06-03 DIAGNOSIS — J44.9 STAGE 4 VERY SEVERE COPD BY GOLD CLASSIFICATION (HCC): ICD-10-CM

## 2025-06-03 DIAGNOSIS — Z95.5 PRESENCE OF DRUG-ELUTING STENT IN RIGHT CORONARY ARTERY: ICD-10-CM

## 2025-06-03 PROCEDURE — 99214 OFFICE O/P EST MOD 30 MIN: CPT

## 2025-06-03 NOTE — ASSESSMENT & PLAN NOTE
He had a prior history of a myocardial infarction in 2012. He had a drug-eluting stent to the RCA.  He is without symptoms of angina.  He will remain on the same medical therapy.  No changes were made.

## 2025-06-03 NOTE — ASSESSMENT & PLAN NOTE
Lab Results   Component Value Date/Time    CHOLESTEROL 85 04/09/2025 01:56 PM    TRIG 87 04/09/2025 01:56 PM    HDL 47 04/09/2025 01:56 PM    LDLCALC 21 04/09/2025 01:56 PM   He will remain on the same dose of atorvastatin.  Continue to follow blood work periodically.

## 2025-06-03 NOTE — ASSESSMENT & PLAN NOTE
Occurred during an acute hospitalization for colitis requiring bowel surgery and acute respiratory failure back in 2018.  He returned to sinus rhythm was placed on amiodarone which was subsequently stopped by Dr Spears.    He will continue on aspirin for stroke prevention and a low CHADS-VASc score of 1.    Continue on metoprolol given his VT.

## 2025-06-03 NOTE — TELEPHONE ENCOUNTER
Received call from pt.  His Zio monitor fell off, he had his daughter reattach it with medical tape.  He said the light on it blinked red a few times, then stopped.  Attempted to warm transfer to office.  Please advise if pt needs replacement or if monitor is okay.

## 2025-06-03 NOTE — ASSESSMENT & PLAN NOTE
Telemetry EMS strips from August 2023 when he was taken to the ER.  His ECG favors VT over SVT with aberrancy, based on his heart rate and QRS morphology.  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  Did require defibrillation and then was restarted on beta-blocker.  Unfortunately he left AGAINST MEDICAL ADVICE before continuing his workup.   He did not complete 2 week zio and lexiscan stress test that was ordered back in 2023  Will reorder. Then after that we will determine if consultation with EP is needed.

## 2025-06-03 NOTE — PROGRESS NOTES
[facility]  General Cardiology Note  Mathew Bird  1960  [id number]      Referring Provider - No ref. provider found  Chief Complaint   Patient presents with    Follow-up     Pt is here for an OV, and wanted to discuss refill for Atorvastatin.     Chest Pain     With exertion    Shortness of Breath     Pt gets constant SOB    Dizziness     With exertion       Assessment & Plan  Ventricular tachycardia (HCC)  Telemetry EMS strips from August 2023 when he was taken to the ER.  His ECG favors VT over SVT with aberrancy, based on his heart rate and QRS morphology.  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  Did require defibrillation and then was restarted on beta-blocker.  Unfortunately he left AGAINST MEDICAL ADVICE before continuing his workup.   He did not complete 2 week zio and lexiscan stress test that was ordered back in 2023  Will reorder. Then after that we will determine if consultation with EP is needed.  Coronary artery disease involving native coronary artery of native heart without angina pectoris  He had a prior history of a myocardial infarction in 2012. He had a drug-eluting stent to the RCA.  He is without symptoms of angina.  He will remain on the same medical therapy.  No changes were made.  Paroxysmal atrial fibrillation (HCC)  Occurred during an acute hospitalization for colitis requiring bowel surgery and acute respiratory failure back in 2018.  He returned to sinus rhythm was placed on amiodarone which was subsequently stopped by Dr Spears.    He will continue on aspirin for stroke prevention and a low CHADS-VASc score of 1.    Continue on metoprolol given his VT.   Dyslipidemia  Lab Results   Component Value Date/Time    CHOLESTEROL 85 04/09/2025 01:56 PM    TRIG 87 04/09/2025 01:56 PM    HDL 47 04/09/2025 01:56 PM    LDLCALC 21 04/09/2025 01:56 PM   He will remain on the same dose of  atorvastatin.  Continue to follow blood work periodically.  Presence of drug-eluting stent in right coronary artery    Stage 4 very severe COPD by GOLD classification ([facility])  HARVEY, chest pain and palpitations likely in setting of COPD  Lexiscan stress test as above    Will RTO in 1 year or sooner if necessary  Patient / Caretaker was advised and educated to call our office  immediately if  patient has any new symptoms of chest pain/shortness of breath, near-syncope, syncope, light headedness sustained palpitations  or any other cardiovascular symptoms before their scheduled follow-up appointment.  ED precautions reviewed    Interval History: 65 y.o.  male  with PMH as below is here for routine visit   Patient of Dr. ARGENIS Spears , last seen in Dec 2023  Paroxysmal atrial fibrillation   Hypertension  Stress induced CM LVEF 25% improved to low normal EF  NSTEMI  Sp KERRIE to RCA in 2012  COPD on home oxygen  Pericarditis  Colitis  Perforation of bowel sp colostomy now reversed     Today,   He is on 4 L oxygen, uses a walker   Feels like Harvey is worse since last OV, has associated center chest pain and palpitations  Chest pain with exertion, palpitations and HARVEY gets better once back on oxygen    Patient Active Problem List    Diagnosis Date Noted    Bowel obstruction (HCC) 11/01/2024    Abnormal CT of the chest 09/30/2024    Type 2 diabetes mellitus, without long-term current use of insulin (HCC) 09/13/2024    Ventricular tachycardia (HCC) 12/08/2023    Bladder wall thickening 09/15/2023    Multiple pulmonary nodules 05/19/2023    Chronic respiratory failure with hypoxia (HCC) 04/25/2023    Abnormal liver function 04/25/2023    Status post reversal of ileostomy 10/25/2018    Presence of drug-eluting stent in right coronary artery 08/24/2018    Paroxysmal atrial fibrillation (HCC) 08/24/2018    Macrocytic anemia 05/24/2018    Moderate protein-calorie malnutrition (HCC) 05/23/2018    Cigarette nicotine dependence without  "complication 05/15/2018    Stage 4 very severe COPD by GOLD classification ([facility]) 01/06/2017    Spinal stenosis of cervical region 12/07/2016    Coronary artery disease involving native coronary artery of native heart without angina pectoris 12/07/2016    Crohn disease ([facility]) 10/22/2016    Atherosclerosis of coronary artery 06/06/2016    Dyslipidemia 10/19/2013     Past Medical History[1]  Social History[2]   Family History[3]  Past Surgical History[4]  Current Medications[5]    Allergies   Allergen Reactions    Medical Tape     Other      Brown cloth band aids       Latex Rash       Vitals:    06/03/25 1512   BP: 136/68   BP Location: Right arm   Patient Position: Sitting   Cuff Size: Large   Pulse: 100   SpO2: 97%   Weight: 87.1 kg (192 lb)   Height: 5' 6\" (1.676 m)        Vitals:    06/03/25 1512   Weight: 87.1 kg (192 lb)      Height: 5' 6\" (167.6 cm)   Body mass index is 30.99 kg/m².    Labs:   Lab Results   Component Value Date/Time    CHOLESTEROL 85 04/09/2025 01:56 PM    TRIG 87 04/09/2025 01:56 PM    HDL 47 04/09/2025 01:56 PM    LDLCALC 21 04/09/2025 01:56 PM      Lab Results   Component Value Date    SODIUM 145 04/09/2025    K 4.9 04/09/2025     (H) 04/09/2025    CREATININE 1.43 (H) 04/09/2025    EGFR 52 04/09/2025    BUN 15 04/09/2025    CO2 26 04/09/2025    ALT 13 04/09/2025    AST 12 (L) 04/09/2025    INR 1.01 09/02/2024    GLUF 111 (H) 04/09/2025    HGBA1C 6.1 (H) 04/09/2025    WBC 10.66 (H) 04/09/2025    HGB 10.6 (L) 04/09/2025    HCT 34.9 (L) 04/09/2025     04/09/2025         Imaging: No results found.    ECG:     Reviewed by AMALIA Pascal      Review of Systems   Constitutional: Positive for malaise/fatigue.   Cardiovascular:  Positive for chest pain, dyspnea on exertion and palpitations.   Respiratory:  Positive for shortness of breath.    All other systems reviewed and are negative.    Except as noted in HPI, is otherwise reviewed in detail and a 12 point review of systems is " negative.    Physical Exam  Vitals reviewed.   Constitutional:       General: He is not in acute distress.     Appearance: Normal appearance. He is not diaphoretic.   HENT:      Head: Normocephalic and atraumatic.     Cardiovascular:      Rate and Rhythm: Normal rate and regular rhythm.      Pulses: Normal pulses.           Radial pulses are 2+ on the right side and 2+ on the left side.      Heart sounds: Normal heart sounds, S1 normal and S2 normal.   Pulmonary:      Effort: No tachypnea.      Breath sounds: Decreased breath sounds present. No wheezing or rales.   Abdominal:      General: There is no distension.      Palpations: Abdomen is soft.     Musculoskeletal:      Right lower leg: No edema.      Left lower leg: No edema.     Skin:     General: Skin is warm and dry.      Capillary Refill: Capillary refill takes less than 2 seconds.     Neurological:      Mental Status: He is alert and oriented to person, place, and time.      Gait: Gait abnormal.     Psychiatric:         Mood and Affect: Mood normal.         Behavior: Behavior normal.          **Please Note: This note may have been constructed using a voice recognition system**     Thank you for the opportunity to participate in the care of this patient.   AMALIA Pascal         [1]   Past Medical History:  Diagnosis Date    DEBBY (acute kidney injury) ([facility]) 07/08/2018    Alcohol abuse 05/15/2018    Anxiety     Asthma     Cardiac disease     Cervical stenosis (uterine cervix)     Chest pain     Chronic kidney disease     Colitis     Colon polyps     COPD (chronic obstructive pulmonary disease) ([facility])     2L @ HS and PRN    Coronary artery disease     Diverticulitis     Esophageal reflux     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HCC)     Heart failure, systolic, due to idiopathic cardiomyopathy ([facility]) 05/21/2018    Hyperlipidemia     Hypertension     IBD (inflammatory bowel disease)     Myocardial infarction (HCC)     Old myocardial infarction     Paroxysmal atrial  fibrillation (HCC) 2018    Perforation of colon (HCC)     Pericarditis 2016    Type 2 diabetes, diet controlled (HCC)     Ulcerative colitis (HCC)     VT (ventricular tachycardia) (HCC) 2023   [2]   Social History  Tobacco Use    Smoking status: Former     Types: Cigars, Cigarettes     Start date: 10/31/2024     Quit date:      Years since quittin.4     Passive exposure: Past    Smokeless tobacco: Never    Tobacco comments:     4-5cigars per day before nowadays smokes about 1 cigar, trying to cut down.   Vaping Use    Vaping status: Never Used   Substance Use Topics    Alcohol use: Not Currently     Comment: last drink may 2023    Drug use: No   [3]   Family History  Problem Relation Name Age of Onset    Coronary artery disease Father      Crohn's disease Father      Stroke Father      Diabetes Family Unknown    [4]   Past Surgical History:  Procedure Laterality Date    ANGIOPLASTY      APPENDECTOMY      COLON SURGERY      COLONOSCOPY N/A 10/24/2016    Procedure: COLONOSCOPY;  Surgeon: Tirso Juárez MD;  Location: [facility];  Service:     CORONARY ANGIOPLASTY WITH STENT PLACEMENT      ESOPHAGOGASTRODUODENOSCOPY N/A 10/24/2016    Procedure: ESOPHAGOGASTRODUODENOSCOPY (EGD);  Surgeon: Tirso Juárez MD;  Location: [facility];  Service:     EXPLORATORY LAPAROTOMY W/ BOWEL RESECTION N/A 2018    Procedure: EXPLORATORY LAPAROTOMY, ILIOCOLECTOMY, ILIOCOLONIC ANASTAMOSIS, LOOP ILIOSTOMY, REPAIR OF SEROSAL TEAR, EXTENSIVE LYSIS OF ADHESIONS, WOUND VAC PLACEMENT;  Surgeon: Tirso Juárez MD;  Location: [facility];  Service: Colorectal    HEMICOLECTOMY      ILEOSTOMY CLOSURE N/A 10/5/2018    Procedure: CLOSURE ILEOSTOMY;  Surgeon: Tirso Juárez MD;  Location: [facility];  Service: Colorectal    OTHER SURGICAL HISTORY      stent indications acute myocardial infarction    KS COLONOSCOPY FLX DX W/COLLJ SPEC WHEN PFRMD N/A 2018    Procedure: COLONOSCOPY;  Surgeon: Tirso  MD Marquita;  Location: [facility];  Service: Colorectal    MT COLONOSCOPY FLX DX W/COLLJ SPEC WHEN PFRMD N/A 10/4/2018    Procedure: COLONOSCOPY;  Surgeon: Tirso Juárez MD;  Location:  GI LAB;  Service: Colorectal    TONSILLECTOMY     [5]   Current Outpatient Medications:     albuterol (2.5 mg/3 mL) 0.083 % nebulizer solution, Take 3 mL (2.5 mg total) by nebulization every 4 (four) hours as needed for wheezing or shortness of breath, Disp: 375 mL, Rfl: 5    albuterol (PROVENTIL HFA,VENTOLIN HFA) 90 mcg/act inhaler, Inhale 2 puffs every 4 (four) hours as needed for wheezing, Disp: 18 g, Rfl: 5    aspirin (Aspirin Low Dose) 81 mg chewable tablet, CHEW 1 TABLET BY MOUTH EVERY DAY, Disp: 90 tablet, Rfl: 1    atorvastatin (LIPITOR) 40 mg tablet, TAKE 1 TABLET BY MOUTH EVERY DAY WITH DINNER, Disp: 90 tablet, Rfl: 1    budesonide-formoterol (Symbicort) 160-4.5 mcg/act inhaler, Inhale 2 puffs daily as needed (for shortness of breath) Rinse mouth after use., Disp: 91.8 g, Rfl: 1    fluticasone-umeclidinium-vilanterol (Trelegy Ellipta) 200-62.5-25 mcg/actuation AEPB inhaler, Inhale 1 puff daily Rinse mouth after use., Disp: 180 blister, Rfl: 1    ipratropium-albuterol (DUO-NEB) 0.5-2.5 mg/3 mL nebulizer solution, Take 3 mL by nebulization 3 (three) times a day, Disp: 810 mL, Rfl: 2    metFORMIN (GLUCOPHAGE-XR) 750 mg 24 hr tablet, Take 1 tablet (750 mg total) by mouth daily with breakfast, Disp: 100 tablet, Rfl: 3    metoprolol succinate (TOPROL-XL) 25 mg 24 hr tablet, TAKE 1 TABLET (25 MG TOTAL) BY MOUTH DAILY AT BEDTIME DAILY AT BEDTIME, Disp: 90 tablet, Rfl: 1    Multiple Vitamins-Minerals (Centrum Silver 50+Men) TABS, Take by mouth, Disp: , Rfl:     Nebulizers (AERONEB GO NEBULIZER HANDSET) MISC, by Does not apply route 2 (two) times a day as needed (wheezing), Disp: 1 each, Rfl: 0    nicotine (NICODERM CQ) 21 mg/24 hr TD 24 hr patch, PLACE 1 PATCH ON THE SKIN OVER 24 HOURS DAILY, Disp: 28 patch, Rfl: 0     pantoprazole (PROTONIX) 40 mg tablet, TAKE 1 TABLET BY MOUTH EVERY DAY, Disp: 90 tablet, Rfl: 1    polyethylene glycol (GOLYTELY) 4000 mL solution, Take 4,000 mL by mouth once for 1 dose, Disp: 4000 mL, Rfl: 0    sulfaSALAzine (AZULFIDINE) 500 mg tablet, TAKE 1 TABLET (500 MG TOTAL) BY MOUTH 4 (FOUR) TIMES A DAY., Disp: 360 tablet, Rfl: 0    traZODone (DESYREL) 50 mg tablet, TAKE 1 TABLET BY MOUTH DAILY AT BEDTIME, Disp: 90 tablet, Rfl: 2    varenicline (CHANTIX) 1 mg tablet, TAKE 1 TABLET BY MOUTH TWICE A DAY, Disp: 180 tablet, Rfl: 3

## 2025-06-04 ENCOUNTER — PROCEDURE VISIT (OUTPATIENT)
Dept: PODIATRY | Facility: CLINIC | Age: 65
End: 2025-06-04
Payer: COMMERCIAL

## 2025-06-04 VITALS — HEIGHT: 66 IN | WEIGHT: 195 LBS | BODY MASS INDEX: 31.34 KG/M2

## 2025-06-04 DIAGNOSIS — Z79.01 LONG TERM CURRENT USE OF ANTICOAGULANT: ICD-10-CM

## 2025-06-04 DIAGNOSIS — E11.9 TYPE 2 DIABETES MELLITUS WITHOUT COMPLICATION, UNSPECIFIED WHETHER LONG TERM INSULIN USE (HCC): ICD-10-CM

## 2025-06-04 DIAGNOSIS — M79.675 PAIN IN TOES OF BOTH FEET: ICD-10-CM

## 2025-06-04 DIAGNOSIS — B35.1 ONYCHOMYCOSIS: Primary | ICD-10-CM

## 2025-06-04 DIAGNOSIS — M79.674 PAIN IN TOES OF BOTH FEET: ICD-10-CM

## 2025-06-04 PROCEDURE — 11721 DEBRIDE NAIL 6 OR MORE: CPT | Performed by: PODIATRIST

## 2025-06-04 NOTE — PROGRESS NOTES
"Name: Mathew Bird      : 1960      MRN: [id number]  Encounter Provider: Robinson Greenberg DPM  Encounter Date: 2025   Encounter department: [facility] [place]  :  Assessment & Plan  Onychomycosis       Debride mycotic nails and thin the nail plates x 10 with the use of a nail nipper manually and an electric Dremel bur was used to reduce the thickness of the nail beds and smoothed the distal aspect of the nails.   Type 2 diabetes mellitus without complication, unspecified whether long term insulin use (HCC)    Lab Results   Component Value Date    HGBA1C 6.1 (H) 2025            Long term current use of anticoagulant         Pain in toes of both feet         Pt was instructed to use lotion once a day on both feet such as cerave, Cetaphil or similar thick style of lotion.   Discussed proper shoe gear, daily inspections of feet, and general foot health with patient. Patient has Q8  findings and is recommended for at risk foot care every 9-10 weeks.    Patients most recent complete clinical foot exam was on: 3/19/2025    Return in about 10 weeks (around 2025).      History of Present Illness   HPI  Mathew Bird is a 65 y.o. male who presents with chief complaint of painful thick nails on both feet.  He is a diabetic whose last A1c was 6.1 drawn on 2025.  Patient presents for at-risk foot care.  Patient has no acute concerns today.  Patient has significant lower extremity risk due to neuropathy, parasthesia, edema, and trophic skin changes to the lower extremity.   History obtained from: patient    Review of Systems  Medical History Reviewed by provider this encounter:     .  Medications Ordered Prior to Encounter[1]   Social History[2]     Objective   Ht 5' 6\" (1.676 m) Comment: verbal  Wt 88.5 kg (195 lb)   BMI 31.47 kg/m²      Reviewed his last PCP's note from 2025.    Physical Exam  Vascular status is is a faint 1/4 DP PT negative digital hair, normal distal " cooling, slightly delayed capillary refill bilaterally.  Capillary refill is approximately 3 to 4 seconds.  Slight edema is noted bilaterally.     Derm nails are brittle elongated hypertrophic yellow-brown discoloration with subungual debris x 10.  There is an increased thickness in the nails of approximately 3 mm.  There is yellow scaly tissue present on the plantar aspect of both feet especially in the forefoot area and in the heels.  No signs of any fissures or dried blood present within the tissue.  There is loss of turgor noted bilaterally and thinning of the skin.       [1]   Current Outpatient Medications on File Prior to Visit   Medication Sig Dispense Refill    albuterol (2.5 mg/3 mL) 0.083 % nebulizer solution Take 3 mL (2.5 mg total) by nebulization every 4 (four) hours as needed for wheezing or shortness of breath 375 mL 5    albuterol (PROVENTIL HFA,VENTOLIN HFA) 90 mcg/act inhaler Inhale 2 puffs every 4 (four) hours as needed for wheezing 18 g 5    aspirin (Aspirin Low Dose) 81 mg chewable tablet CHEW 1 TABLET BY MOUTH EVERY DAY 90 tablet 1    atorvastatin (LIPITOR) 40 mg tablet TAKE 1 TABLET BY MOUTH EVERY DAY WITH DINNER 90 tablet 1    budesonide-formoterol (Symbicort) 160-4.5 mcg/act inhaler Inhale 2 puffs daily as needed (for shortness of breath) Rinse mouth after use. 91.8 g 1    fluticasone-umeclidinium-vilanterol (Trelegy Ellipta) 200-62.5-25 mcg/actuation AEPB inhaler Inhale 1 puff daily Rinse mouth after use. 180 blister 1    ipratropium-albuterol (DUO-NEB) 0.5-2.5 mg/3 mL nebulizer solution Take 3 mL by nebulization 3 (three) times a day 810 mL 2    metFORMIN (GLUCOPHAGE-XR) 750 mg 24 hr tablet Take 1 tablet (750 mg total) by mouth daily with breakfast 100 tablet 3    metoprolol succinate (TOPROL-XL) 25 mg 24 hr tablet TAKE 1 TABLET (25 MG TOTAL) BY MOUTH DAILY AT BEDTIME DAILY AT BEDTIME 90 tablet 1    Multiple Vitamins-Minerals (Centrum Silver 50+Men) TABS Take by mouth      Nebulizers  (AERONEB GO NEBULIZER HANDSET) MISC by Does not apply route 2 (two) times a day as needed (wheezing) 1 each 0    nicotine (NICODERM CQ) 21 mg/24 hr TD 24 hr patch PLACE 1 PATCH ON THE SKIN OVER 24 HOURS DAILY 28 patch 0    pantoprazole (PROTONIX) 40 mg tablet TAKE 1 TABLET BY MOUTH EVERY DAY 90 tablet 1    sulfaSALAzine (AZULFIDINE) 500 mg tablet TAKE 1 TABLET (500 MG TOTAL) BY MOUTH 4 (FOUR) TIMES A DAY. 360 tablet 0    traZODone (DESYREL) 50 mg tablet TAKE 1 TABLET BY MOUTH DAILY AT BEDTIME 90 tablet 2    varenicline (CHANTIX) 1 mg tablet TAKE 1 TABLET BY MOUTH TWICE A  tablet 3    polyethylene glycol (GOLYTELY) 4000 mL solution Take 4,000 mL by mouth once for 1 dose 4000 mL 0     No current facility-administered medications on file prior to visit.   [2]   Social History  Tobacco Use    Smoking status: Former     Types: Cigars, Cigarettes     Start date: 10/31/2024     Quit date:      Years since quittin.4     Passive exposure: Past    Smokeless tobacco: Never    Tobacco comments:     4-5cigars per day before nowadays smokes about 1 cigar, trying to cut down.   Vaping Use    Vaping status: Never Used   Substance and Sexual Activity    Alcohol use: Not Currently     Comment: last drink may 2023    Drug use: No    Sexual activity: Yes     Partners: Female     Birth control/protection: None

## 2025-06-16 ENCOUNTER — OFFICE VISIT (OUTPATIENT)
Dept: FAMILY MEDICINE CLINIC | Facility: CLINIC | Age: 65
End: 2025-06-16
Payer: COMMERCIAL

## 2025-06-16 ENCOUNTER — TELEPHONE (OUTPATIENT)
Age: 65
End: 2025-06-16

## 2025-06-16 VITALS
RESPIRATION RATE: 17 BRPM | HEIGHT: 66 IN | BODY MASS INDEX: 31.66 KG/M2 | OXYGEN SATURATION: 99 % | WEIGHT: 197 LBS | DIASTOLIC BLOOD PRESSURE: 80 MMHG | TEMPERATURE: 98.1 F | HEART RATE: 114 BPM | SYSTOLIC BLOOD PRESSURE: 130 MMHG

## 2025-06-16 DIAGNOSIS — E11.9 TYPE 2 DIABETES MELLITUS WITHOUT COMPLICATION, WITHOUT LONG-TERM CURRENT USE OF INSULIN (HCC): Primary | ICD-10-CM

## 2025-06-16 DIAGNOSIS — J96.11 CHRONIC RESPIRATORY FAILURE WITH HYPOXIA (HCC): ICD-10-CM

## 2025-06-16 DIAGNOSIS — E44.0 MODERATE PROTEIN-CALORIE MALNUTRITION (HCC): ICD-10-CM

## 2025-06-16 DIAGNOSIS — K50.018 CROHN'S DISEASE OF SMALL INTESTINE WITH OTHER COMPLICATION (HCC): ICD-10-CM

## 2025-06-16 DIAGNOSIS — E78.5 DYSLIPIDEMIA: ICD-10-CM

## 2025-06-16 DIAGNOSIS — F51.01 PRIMARY INSOMNIA: ICD-10-CM

## 2025-06-16 DIAGNOSIS — I25.10 CORONARY ARTERY DISEASE INVOLVING NATIVE CORONARY ARTERY OF NATIVE HEART WITHOUT ANGINA PECTORIS: ICD-10-CM

## 2025-06-16 PROBLEM — K56.609 BOWEL OBSTRUCTION (HCC): Status: RESOLVED | Noted: 2024-11-01 | Resolved: 2025-06-16

## 2025-06-16 PROCEDURE — 99215 OFFICE O/P EST HI 40 MIN: CPT | Performed by: FAMILY MEDICINE

## 2025-06-16 RX ORDER — TRAZODONE HYDROCHLORIDE 50 MG/1
100 TABLET ORAL
Qty: 180 TABLET | Refills: 2 | Status: SHIPPED | OUTPATIENT
Start: 2025-06-16

## 2025-06-16 NOTE — ASSESSMENT & PLAN NOTE
Lab Results   Component Value Date    HGBA1C 6.1 (H) 04/09/2025   Stable on metformin  Added ozempic today for cardiovascular benefits and help with weight loss   Orders:  •  semaglutide, 0.25 or 0.5 mg/dose, (Ozempic, 0.25 or 0.5 MG/DOSE,) 2 mg/3 mL injection pen; 0.25 mg under the skin every 7 days for 4 doses (28 days), THEN 0.5 mg under the skin every 7 days  •  Albumin / creatinine urine ratio; Future  •  Comprehensive metabolic panel; Future  •  Hemoglobin A1C; Future  •  Lipid Panel with Direct LDL reflex; Future  •  CBC and Platelet; Future

## 2025-06-16 NOTE — PROGRESS NOTES
Name: Mathew Bird      : 1960      MRN: [id number]  Encounter Provider: Mariola Doyle MD  Encounter Date: 2025   Encounter department: [facility]  :  Assessment & Plan  Type 2 diabetes mellitus without complication, without long-term current use of insulin (HCC)    Lab Results   Component Value Date    HGBA1C 6.1 (H) 2025   Stable on metformin  Added ozempic today for cardiovascular benefits and help with weight loss   Orders:  •  semaglutide, 0.25 or 0.5 mg/dose, (Ozempic, 0.25 or 0.5 MG/DOSE,) 2 mg/3 mL injection pen; 0.25 mg under the skin every 7 days for 4 doses (28 days), THEN 0.5 mg under the skin every 7 days  •  Albumin / creatinine urine ratio; Future  •  Comprehensive metabolic panel; Future  •  Hemoglobin A1C; Future  •  Lipid Panel with Direct LDL reflex; Future  •  CBC and Platelet; Future    Coronary artery disease involving native coronary artery of native heart without angina pectoris  On ASA 81 mg daily       Chronic respiratory failure with hypoxia (HCC)  4 L on Oxygen all day       Crohn's disease of small intestine with other complication (HCC)  Stable on sulfaSalazine       Primary insomnia    Orders:  •  traZODone (DESYREL) 50 mg tablet; Take 2 tablets (100 mg total) by mouth daily at bedtime    Moderate protein-calorie malnutrition (HCC)  To increase protein in her diet       Dyslipidemia  On atorvastatin              History of Present Illness   HPI  Here for follow up  Feels well overall  Trouble sleeping at night with trazodone 50mg and wants to go up on the dosage     Review of Systems   Constitutional: Negative.    HENT: Negative.     Respiratory:  Positive for shortness of breath.    Cardiovascular: Negative.    Genitourinary: Negative.    Allergic/Immunologic: Negative.    Neurological: Negative.    Hematological: Negative.    Psychiatric/Behavioral: Negative.         Objective   /80 (BP Location: Left arm, Patient Position: Sitting,  "Cuff Size: Standard)   Pulse (!) 114   Temp 98.1 °F (36.7 °C) (Tympanic)   Resp 17   Ht 5' 6\" (1.676 m)   Wt 89.4 kg (197 lb)   SpO2 99%   BMI 31.80 kg/m²      Physical Exam  Vitals and nursing note reviewed.   Constitutional:       Appearance: Normal appearance.     Cardiovascular:      Rate and Rhythm: Normal rate and regular rhythm.      Pulses: Normal pulses.      Heart sounds: Normal heart sounds.   Pulmonary:      Effort: Pulmonary effort is normal.      Breath sounds: Normal breath sounds.     Neurological:      General: No focal deficit present.      Mental Status: He is alert and oriented to person, place, and time.     Psychiatric:         Mood and Affect: Mood normal.         Behavior: Behavior normal.       Administrative Statements   I have spent a total time of 40 minutes in caring for this patient on the day of the visit/encounter including Risks and benefits of tx options, Instructions for management, Patient and family education, Counseling / Coordination of care, Documenting in the medical record, and Reviewing/placing orders in the medical record (including tests, medications, and/or procedures).  "

## 2025-06-16 NOTE — TELEPHONE ENCOUNTER
PA for  (Ozempic, 0.25 or 0.5 MG/DOSE,) SUBMITTED to [employer]    via      [x]mobli-Case ID #       [x]PA sent as URGENT    All office notes, labs and other pertaining documents and studies sent. Clinical questions answered. Awaiting determination from insurance company.     Turnaround time for your insurance to make a decision on your Prior Authorization can take 7-21 business days.

## 2025-06-17 NOTE — TELEPHONE ENCOUNTER
PA for (Ozempic, 0.25 or 0.5 MG/DOSE,)  APPROVED     Date(s) approved June 16, 2025 to June 16, 2026     Case #    Patient advised by          []MyChart Message  [x]Phone call   []LMOM  []L/M to call office as no active Communication consent on file  []Unable to leave detailed message as VM not approved on Communication consent       Pharmacy advised by    [x]Fax  []Phone call  []Secure Chat    Specialty Pharmacy    []     Approval letter scanned into Media Yes

## 2025-06-20 ENCOUNTER — APPOINTMENT (EMERGENCY)
Dept: RADIOLOGY | Facility: HOSPITAL | Age: 65
DRG: 386 | End: 2025-06-20
Payer: COMMERCIAL

## 2025-06-20 ENCOUNTER — APPOINTMENT (INPATIENT)
Dept: RADIOLOGY | Facility: HOSPITAL | Age: 65
DRG: 386 | End: 2025-06-20
Payer: COMMERCIAL

## 2025-06-20 ENCOUNTER — HOSPITAL ENCOUNTER (INPATIENT)
Facility: HOSPITAL | Age: 65
LOS: 5 days | Discharge: HOME | DRG: 386 | End: 2025-06-25
Attending: EMERGENCY MEDICINE | Admitting: INTERNAL MEDICINE
Payer: COMMERCIAL

## 2025-06-20 DIAGNOSIS — K56.609 SBO (SMALL BOWEL OBSTRUCTION) (HCC): Primary | ICD-10-CM

## 2025-06-20 DIAGNOSIS — K50.90 CROHN DISEASE (HCC): ICD-10-CM

## 2025-06-20 DIAGNOSIS — I48.0 PAROXYSMAL ATRIAL FIBRILLATION (HCC): ICD-10-CM

## 2025-06-20 DIAGNOSIS — R10.9 ABDOMINAL PAIN: ICD-10-CM

## 2025-06-20 DIAGNOSIS — I48.91 ATRIAL FIBRILLATION WITH RAPID VENTRICULAR RESPONSE (HCC): ICD-10-CM

## 2025-06-20 DIAGNOSIS — I47.20 VENTRICULAR TACHYCARDIA (HCC): ICD-10-CM

## 2025-06-20 LAB
ALBUMIN SERPL BCG-MCNC: 4.2 G/DL (ref 3.5–5)
ALP SERPL-CCNC: 86 U/L (ref 34–104)
ALT SERPL W P-5'-P-CCNC: 13 U/L (ref 7–52)
ANION GAP SERPL CALCULATED.3IONS-SCNC: 8 MMOL/L (ref 4–13)
AST SERPL W P-5'-P-CCNC: 16 U/L (ref 13–39)
BASOPHILS # BLD AUTO: 0.04 THOUSANDS/ÂΜL (ref 0–0.1)
BASOPHILS NFR BLD AUTO: 0 % (ref 0–1)
BILIRUB SERPL-MCNC: 0.35 MG/DL (ref 0.2–1)
BUN SERPL-MCNC: 19 MG/DL (ref 5–25)
CALCIUM SERPL-MCNC: 9.5 MG/DL (ref 8.4–10.2)
CHLORIDE SERPL-SCNC: 106 MMOL/L (ref 96–108)
CO2 SERPL-SCNC: 29 MMOL/L (ref 21–32)
CREAT SERPL-MCNC: 1.45 MG/DL (ref 0.6–1.3)
CRP SERPL QL: 3.3 MG/L
EOSINOPHIL # BLD AUTO: 0.16 THOUSAND/ÂΜL (ref 0–0.61)
EOSINOPHIL NFR BLD AUTO: 1 % (ref 0–6)
ERYTHROCYTE [DISTWIDTH] IN BLOOD BY AUTOMATED COUNT: 15.1 % (ref 11.6–15.1)
GFR SERPL CREATININE-BSD FRML MDRD: 50 ML/MIN/1.73SQ M
GLUCOSE SERPL-MCNC: 118 MG/DL (ref 65–140)
GLUCOSE SERPL-MCNC: 120 MG/DL (ref 65–140)
HCT VFR BLD AUTO: 35.7 % (ref 36.5–49.3)
HGB BLD-MCNC: 11 G/DL (ref 12–17)
IMM GRANULOCYTES # BLD AUTO: 0.08 THOUSAND/UL (ref 0–0.2)
IMM GRANULOCYTES NFR BLD AUTO: 1 % (ref 0–2)
LACTATE SERPL-SCNC: 0.5 MMOL/L (ref 0.5–2)
LIPASE SERPL-CCNC: 29 U/L (ref 11–82)
LYMPHOCYTES # BLD AUTO: 1.16 THOUSANDS/ÂΜL (ref 0.6–4.47)
LYMPHOCYTES NFR BLD AUTO: 8 % (ref 14–44)
MCH RBC QN AUTO: 26.5 PG (ref 26.8–34.3)
MCHC RBC AUTO-ENTMCNC: 30.8 G/DL (ref 31.4–37.4)
MCV RBC AUTO: 86 FL (ref 82–98)
MONOCYTES # BLD AUTO: 1.18 THOUSAND/ÂΜL (ref 0.17–1.22)
MONOCYTES NFR BLD AUTO: 8 % (ref 4–12)
NEUTROPHILS # BLD AUTO: 11.8 THOUSANDS/ÂΜL (ref 1.85–7.62)
NEUTS SEG NFR BLD AUTO: 82 % (ref 43–75)
NRBC BLD AUTO-RTO: 0 /100 WBCS
PLATELET # BLD AUTO: 189 THOUSANDS/UL (ref 149–390)
PMV BLD AUTO: 11.5 FL (ref 8.9–12.7)
POTASSIUM SERPL-SCNC: 4.5 MMOL/L (ref 3.5–5.3)
PROT SERPL-MCNC: 7.5 G/DL (ref 6.4–8.4)
RBC # BLD AUTO: 4.15 MILLION/UL (ref 3.88–5.62)
SODIUM SERPL-SCNC: 143 MMOL/L (ref 135–147)
WBC # BLD AUTO: 14.42 THOUSAND/UL (ref 4.31–10.16)

## 2025-06-20 PROCEDURE — 99223 1ST HOSP IP/OBS HIGH 75: CPT | Performed by: INTERNAL MEDICINE

## 2025-06-20 PROCEDURE — 80053 COMPREHEN METABOLIC PANEL: CPT

## 2025-06-20 PROCEDURE — 96376 TX/PRO/DX INJ SAME DRUG ADON: CPT

## 2025-06-20 PROCEDURE — 94640 AIRWAY INHALATION TREATMENT: CPT

## 2025-06-20 PROCEDURE — 94664 DEMO&/EVAL PT USE INHALER: CPT

## 2025-06-20 PROCEDURE — 85025 COMPLETE CBC W/AUTO DIFF WBC: CPT

## 2025-06-20 PROCEDURE — 83605 ASSAY OF LACTIC ACID: CPT

## 2025-06-20 PROCEDURE — 83690 ASSAY OF LIPASE: CPT

## 2025-06-20 PROCEDURE — 96374 THER/PROPH/DIAG INJ IV PUSH: CPT

## 2025-06-20 PROCEDURE — 74177 CT ABD & PELVIS W/CONTRAST: CPT

## 2025-06-20 PROCEDURE — 82948 REAGENT STRIP/BLOOD GLUCOSE: CPT

## 2025-06-20 PROCEDURE — 94760 N-INVAS EAR/PLS OXIMETRY 1: CPT

## 2025-06-20 PROCEDURE — 36415 COLL VENOUS BLD VENIPUNCTURE: CPT

## 2025-06-20 PROCEDURE — 99285 EMERGENCY DEPT VISIT HI MDM: CPT

## 2025-06-20 PROCEDURE — 99222 1ST HOSP IP/OBS MODERATE 55: CPT

## 2025-06-20 PROCEDURE — 96361 HYDRATE IV INFUSION ADD-ON: CPT

## 2025-06-20 PROCEDURE — 99285 EMERGENCY DEPT VISIT HI MDM: CPT | Performed by: EMERGENCY MEDICINE

## 2025-06-20 PROCEDURE — 96375 TX/PRO/DX INJ NEW DRUG ADDON: CPT

## 2025-06-20 PROCEDURE — 86140 C-REACTIVE PROTEIN: CPT | Performed by: STUDENT IN AN ORGANIZED HEALTH CARE EDUCATION/TRAINING PROGRAM

## 2025-06-20 RX ORDER — PANTOPRAZOLE SODIUM 40 MG/1
40 TABLET, DELAYED RELEASE ORAL DAILY
Status: DISCONTINUED | OUTPATIENT
Start: 2025-06-21 | End: 2025-06-25 | Stop reason: HOSPADM

## 2025-06-20 RX ORDER — BUDESONIDE AND FORMOTEROL FUMARATE DIHYDRATE 160; 4.5 UG/1; UG/1
2 AEROSOL RESPIRATORY (INHALATION) DAILY PRN
Status: DISCONTINUED | OUTPATIENT
Start: 2025-06-20 | End: 2025-06-20

## 2025-06-20 RX ORDER — ONDANSETRON 2 MG/ML
4 INJECTION INTRAMUSCULAR; INTRAVENOUS ONCE
Status: COMPLETED | OUTPATIENT
Start: 2025-06-20 | End: 2025-06-20

## 2025-06-20 RX ORDER — FLUTICASONE FUROATE AND VILANTEROL 200; 25 UG/1; UG/1
1 POWDER RESPIRATORY (INHALATION) DAILY
Status: DISCONTINUED | OUTPATIENT
Start: 2025-06-21 | End: 2025-06-20

## 2025-06-20 RX ORDER — INSULIN LISPRO 100 [IU]/ML
2-12 INJECTION, SOLUTION INTRAVENOUS; SUBCUTANEOUS EVERY 6 HOURS SCHEDULED
Status: DISCONTINUED | OUTPATIENT
Start: 2025-06-20 | End: 2025-06-20

## 2025-06-20 RX ORDER — IPRATROPIUM BROMIDE AND ALBUTEROL SULFATE 2.5; .5 MG/3ML; MG/3ML
3 SOLUTION RESPIRATORY (INHALATION) 3 TIMES DAILY
Status: DISCONTINUED | OUTPATIENT
Start: 2025-06-20 | End: 2025-06-20

## 2025-06-20 RX ORDER — METOPROLOL SUCCINATE 25 MG/1
25 TABLET, EXTENDED RELEASE ORAL
Status: DISCONTINUED | OUTPATIENT
Start: 2025-06-20 | End: 2025-06-24

## 2025-06-20 RX ORDER — TRAZODONE HYDROCHLORIDE 100 MG/1
100 TABLET ORAL
Status: DISCONTINUED | OUTPATIENT
Start: 2025-06-20 | End: 2025-06-25 | Stop reason: HOSPADM

## 2025-06-20 RX ORDER — OXYMETAZOLINE HYDROCHLORIDE 0.05 G/100ML
2 SPRAY NASAL ONCE
Status: COMPLETED | OUTPATIENT
Start: 2025-06-20 | End: 2025-06-20

## 2025-06-20 RX ORDER — MORPHINE SULFATE 4 MG/ML
4 INJECTION, SOLUTION INTRAMUSCULAR; INTRAVENOUS EVERY 6 HOURS PRN
Status: DISCONTINUED | OUTPATIENT
Start: 2025-06-20 | End: 2025-06-21

## 2025-06-20 RX ORDER — HYDROMORPHONE HCL/PF 1 MG/ML
0.5 SYRINGE (ML) INJECTION ONCE
Refills: 0 | Status: COMPLETED | OUTPATIENT
Start: 2025-06-20 | End: 2025-06-20

## 2025-06-20 RX ORDER — ONDANSETRON 2 MG/ML
4 INJECTION INTRAMUSCULAR; INTRAVENOUS EVERY 8 HOURS PRN
Status: DISCONTINUED | OUTPATIENT
Start: 2025-06-20 | End: 2025-06-25 | Stop reason: HOSPADM

## 2025-06-20 RX ORDER — FLUTICASONE FUROATE AND VILANTEROL 200; 25 UG/1; UG/1
1 POWDER RESPIRATORY (INHALATION) DAILY
Status: DISCONTINUED | OUTPATIENT
Start: 2025-06-20 | End: 2025-06-25 | Stop reason: HOSPADM

## 2025-06-20 RX ORDER — SODIUM CHLORIDE 9 MG/ML
75 INJECTION, SOLUTION INTRAVENOUS CONTINUOUS
Status: DISCONTINUED | OUTPATIENT
Start: 2025-06-20 | End: 2025-06-20

## 2025-06-20 RX ORDER — HYDROMORPHONE HCL/PF 1 MG/ML
1 SYRINGE (ML) INJECTION ONCE
Status: COMPLETED | OUTPATIENT
Start: 2025-06-20 | End: 2025-06-20

## 2025-06-20 RX ORDER — INSULIN LISPRO 100 [IU]/ML
2-12 INJECTION, SOLUTION INTRAVENOUS; SUBCUTANEOUS
Status: DISCONTINUED | OUTPATIENT
Start: 2025-06-21 | End: 2025-06-24

## 2025-06-20 RX ORDER — ALBUTEROL SULFATE 0.83 MG/ML
2.5 SOLUTION RESPIRATORY (INHALATION) EVERY 4 HOURS PRN
Status: DISCONTINUED | OUTPATIENT
Start: 2025-06-20 | End: 2025-06-22

## 2025-06-20 RX ORDER — ATORVASTATIN CALCIUM 40 MG/1
40 TABLET, FILM COATED ORAL
Status: DISCONTINUED | OUTPATIENT
Start: 2025-06-21 | End: 2025-06-25 | Stop reason: HOSPADM

## 2025-06-20 RX ADMIN — TRAZODONE HYDROCHLORIDE 100 MG: 100 TABLET ORAL at 22:52

## 2025-06-20 RX ADMIN — HYDROMORPHONE HYDROCHLORIDE 1 MG: 1 INJECTION, SOLUTION INTRAMUSCULAR; INTRAVENOUS; SUBCUTANEOUS at 11:45

## 2025-06-20 RX ADMIN — IOHEXOL 100 ML: 350 INJECTION, SOLUTION INTRAVENOUS at 10:49

## 2025-06-20 RX ADMIN — MORPHINE SULFATE 4 MG: 4 INJECTION INTRAVENOUS at 22:51

## 2025-06-20 RX ADMIN — FLUTICASONE FUROATE AND VILANTEROL TRIFENATATE 1 PUFF: 200; 25 POWDER RESPIRATORY (INHALATION) at 22:54

## 2025-06-20 RX ADMIN — UMECLIDINIUM 1 PUFF: 62.5 AEROSOL, POWDER ORAL at 22:54

## 2025-06-20 RX ADMIN — ALBUTEROL SULFATE 2.5 MG: 2.5 SOLUTION RESPIRATORY (INHALATION) at 21:27

## 2025-06-20 RX ADMIN — ONDANSETRON 4 MG: 2 INJECTION INTRAMUSCULAR; INTRAVENOUS at 09:41

## 2025-06-20 RX ADMIN — HYDROMORPHONE HYDROCHLORIDE 0.5 MG: 1 INJECTION, SOLUTION INTRAMUSCULAR; INTRAVENOUS; SUBCUTANEOUS at 09:42

## 2025-06-20 RX ADMIN — METOPROLOL SUCCINATE 25 MG: 25 TABLET, EXTENDED RELEASE ORAL at 21:44

## 2025-06-20 RX ADMIN — MORPHINE SULFATE 2 MG: 2 INJECTION, SOLUTION INTRAMUSCULAR; INTRAVENOUS at 18:55

## 2025-06-20 RX ADMIN — SODIUM CHLORIDE 500 ML: 0.9 INJECTION, SOLUTION INTRAVENOUS at 09:42

## 2025-06-20 RX ADMIN — OXYMETAZOLINE HYDROCHLORIDE 2 SPRAY: 0.05 SPRAY NASAL at 09:42

## 2025-06-20 NOTE — ED ATTENDING ATTESTATION
6/20/2025  I, Ugo Delgadillo MD, saw and evaluated the patient. I have discussed the patient with the resident/non-physician practitioner and agree with the resident's/non-physician practitioner's findings, Plan of Care, and MDM as documented in the resident's/non-physician practitioner's note, except where noted. All available labs and Radiology studies were reviewed.  I was present for key portions of any procedure(s) performed by the resident/non-physician practitioner and I was immediately available to provide assistance.       At this point I agree with the current assessment done in the Emergency Department.  I have conducted an independent evaluation of this patient a history and physical is as follows:  Abd pain   right sided  2 days       No n/v/d/f/c  no melena or brbpr  prior abd surgery  chrons dz  colostomy with reversal   Bowel obstruction   Exam vss afebrile   Anicteric  mmm  lungs clear heart rrr no m     Abdomen is obese the patient has multiple surgical scars he is distended bowel sounds are present he has a ventral hernia that is not red or warm  Impression abdominal pain concern for bowel obstruction  Plan lab workup CT scan pain control IV fluids antiemetics  ED Course     CT scan concerning for partial small bowel obstruction surgical consultation patient admitted to the hospital    Critical Care Time  Procedures

## 2025-06-20 NOTE — ASSESSMENT & PLAN NOTE
Patient is a 66 y/o M who presented to the ED with 1 day of RLQ abdominal pain. CT findings significant for dilated small bowel loops proximal to ileocolonic anastomosis measuring 5cm concerning for partial obstruction     - Admit to General Surgery Service  -

## 2025-06-20 NOTE — ASSESSMENT & PLAN NOTE
s/p ilela colectomy with anastomosis and ileostomy which has been reversed October 2018   Not in exacerbation

## 2025-06-20 NOTE — ASSESSMENT & PLAN NOTE
CT findings significant for dilated small bowel loops proximal to ileocolonic anastomosis measuring 5cm concerning for partial obstruction   No acute surgical intervention per surgery  Clear liquid diet  Zofran for nausea vomiting  serial abdominal exams   Morphine for pain

## 2025-06-20 NOTE — H&P
"H&P - Hospitalist   Name: Mathew Bird 65 y.o. male I MRN: [id number]  Unit/Bed#: ED 24 I Date of Admission: 6/20/2025   Date of Service: 6/20/2025 I Hospital Day: 0     Assessment & Plan  SBO (small bowel obstruction) ([facility])  CT findings significant for dilated small bowel loops proximal to ileocolonic anastomosis measuring 5cm concerning for partial obstruction   No acute surgical intervention per surgery  Clear liquid diet  Zofran for nausea vomiting  serial abdominal exams   Morphine for pain  Coronary artery disease involving native coronary artery of native heart without angina pectoris  Holding aspirin  Cont  statin   Stage 4 very severe COPD by GOLD classification ([facility])  Continue Trelegy, DuoNebs  Paroxysmal atrial fibrillation ([facility])  Not on anticoagulation  Continue metoprolol  Type 2 diabetes mellitus, without long-term current use of insulin ([facility])  Lab Results   Component Value Date    HGBA1C 6.1 (H) 04/09/2025   At home on metformin 750 mg daily, will hold while NPO  Insulin sliding scale with fingersticks every 6 hours      No results for input(s): \"POCGLU\" in the last 72 hours.    Blood Sugar Average: Last 72 hrs:      Crohn disease ([facility])  s/p ilela colectomy with anastomosis and ileostomy which has been reversed October 2018   Not in exacerbation      VTE Pharmacologic Prophylaxis:   Moderate Risk (Score 3-4) - Pharmacological DVT Prophylaxis Contraindicated. Sequential Compression Devices Ordered.  Code Status: Level 1 - Full Code   Discussion with family:  .     Anticipated Length of Stay: Patient will be admitted on an observation basis with an anticipated length of stay of less than 2 midnights secondary to small bowel obstruction.    History of Present Illness   Chief Complaint: Abdominal pain    Mathew Bird is a 65 y.o. male with a PMH of of Crohn's disease s/p ilela colectomy with anastomosis and ileostomy which has been reversed October 2018, diabetes, CAD , COPD who presented here for " abdominal pain.  Patient reports pain started 1 day ago localized to right lower quadrant.  Patient has a history of previous small bowel obstruction and reports that the pain is similar to the previous event.  Patient reports a lot of belching.  He denies any associated nausea, vomiting.  He is able to tolerate oral intake in the ED, however still complaining of abdominal pain 8 out of 10.  Reports last BM this morning he is passing flatus.  Patient denies fevers, chills,  constipation, dysuria, hematuria, SOB, and chest pain.       Review of Systems   Constitutional: Negative.    HENT: Negative.     Respiratory: Negative.     Cardiovascular: Negative.    Gastrointestinal:  Positive for abdominal pain. Negative for abdominal distention, anal bleeding, blood in stool, constipation, diarrhea and nausea.   Genitourinary: Negative.    Neurological: Negative.        Historical Information   Past Medical History[1]  Past Surgical History[2]  Social History[3]  E-Cigarette/Vaping    E-Cigarette Use Never User      E-Cigarette/Vaping Substances    Nicotine No     THC No     CBD No     Flavoring No     Other No     Unknown No        Social History:  Marital Status:    Occupation:   Patient Pre-hospital Living Situation: Home  Patient Pre-hospital Level of Mobility: walks  Patient Pre-hospital Diet Restrictions:     Meds/Allergies   I have reviewed home medications with patient personally.  Prior to Admission medications    Medication Sig Start Date End Date Taking? Authorizing Provider   albuterol (2.5 mg/3 mL) 0.083 % nebulizer solution Take 3 mL (2.5 mg total) by nebulization every 4 (four) hours as needed for wheezing or shortness of breath 6/3/24   Rony Limon MD   albuterol (PROVENTIL HFA,VENTOLIN HFA) 90 mcg/act inhaler Inhale 2 puffs every 4 (four) hours as needed for wheezing 5/12/25   Rony Limon MD   aspirin (Aspirin Low Dose) 81 mg chewable tablet CHEW 1 TABLET BY MOUTH EVERY DAY 2/25/25    AMALIA Wilkerson   atorvastatin (LIPITOR) 40 mg tablet TAKE 1 TABLET BY MOUTH EVERY DAY WITH DINNER 5/6/25   Holland Spears MD   budesonide-formoterol (Symbicort) 160-4.5 mcg/act inhaler Inhale 2 puffs daily as needed (for shortness of breath) Rinse mouth after use. 5/12/25   Preet Canchola PA-C   fluticasone-umeclidinium-vilanterol (Trelegy Ellipta) 200-62.5-25 mcg/actuation AEPB inhaler Inhale 1 puff daily Rinse mouth after use. 4/9/25   Devang King MD   ipratropium-albuterol (DUO-NEB) 0.5-2.5 mg/3 mL nebulizer solution Take 3 mL by nebulization 3 (three) times a day 12/31/24   Devang King MD   metFORMIN (GLUCOPHAGE-XR) 750 mg 24 hr tablet Take 1 tablet (750 mg total) by mouth daily with breakfast 9/13/24   Mariola Doyle MD   metoprolol succinate (TOPROL-XL) 25 mg 24 hr tablet TAKE 1 TABLET (25 MG TOTAL) BY MOUTH DAILY AT BEDTIME DAILY AT BEDTIME 4/9/25   AMALIA Wilkerson   Multiple Vitamins-Minerals (Centrum Silver 50+Men) TABS Take by mouth    Historical Provider, MD   Nebulizers (AERONEB GO NEBULIZER HANDSET) MISC by Does not apply route 2 (two) times a day as needed (wheezing) 6/8/18   Luciano Echevarria MD   pantoprazole (PROTONIX) 40 mg tablet TAKE 1 TABLET BY MOUTH EVERY DAY 2/2/25   AMALIA Carnes   polyethylene glycol (GOLYTELY) 4000 mL solution Take 4,000 mL by mouth once for 1 dose 3/21/25 6/16/25  Denia Parks PA-C   semaglutide, 0.25 or 0.5 mg/dose, (Ozempic, 0.25 or 0.5 MG/DOSE,) 2 mg/3 mL injection pen 0.25 mg under the skin every 7 days for 4 doses (28 days), THEN 0.5 mg under the skin every 7 days 6/16/25   Mariola Doyle MD   sulfaSALAzine (AZULFIDINE) 500 mg tablet TAKE 1 TABLET (500 MG TOTAL) BY MOUTH 4 (FOUR) TIMES A DAY. 5/12/25 8/10/25  Mariola Doyle MD   traZODone (DESYREL) 50 mg tablet Take 2 tablets (100 mg total) by mouth daily at bedtime 6/16/25   Mariola Doyle MD     Allergies   Allergen Reactions    Medical Tape     Other      Brown cloth band aids        Latex Rash       Objective :  Temp:  [97.7 °F (36.5 °C)] 97.7 °F (36.5 °C)  HR:  [84-93] 90  BP: (107-140)/(64-78) 140/70  Resp:  [18-20] 18  SpO2:  [98 %-99 %] 99 %  O2 Device: None (Room air)  Nasal Cannula O2 Flow Rate (L/min):  [4 L/min] 4 L/min    Physical Exam  Constitutional:       General: He is not in acute distress.     Appearance: Normal appearance. He is not ill-appearing or diaphoretic.   HENT:      Head: Normocephalic and atraumatic.      Nose: Nose normal.      Mouth/Throat:      Mouth: Mucous membranes are moist.     Eyes:      Pupils: Pupils are equal, round, and reactive to light.       Cardiovascular:      Rate and Rhythm: Normal rate and regular rhythm.      Pulses: Normal pulses.      Heart sounds: Normal heart sounds.   Pulmonary:      Effort: Pulmonary effort is normal.      Breath sounds: Normal breath sounds.   Abdominal:      General: Abdomen is flat. There is distension.      Palpations: Abdomen is soft. There is no mass.      Tenderness: There is abdominal tenderness. There is no right CVA tenderness, left CVA tenderness, guarding or rebound.      Hernia: No hernia is present.     Skin:     General: Skin is warm and dry.      Capillary Refill: Capillary refill takes less than 2 seconds.     Neurological:      Mental Status: He is alert and oriented to person, place, and time.          Lines/Drains:            Lab Results: I have reviewed the following results:  Results from last 7 days   Lab Units 06/20/25  0946   WBC Thousand/uL 14.42*   HEMOGLOBIN g/dL 11.0*   HEMATOCRIT % 35.7*   PLATELETS Thousands/uL 189   SEGS PCT % 82*   LYMPHO PCT % 8*   MONO PCT % 8   EOS PCT % 1     Results from last 7 days   Lab Units 06/20/25  0946   SODIUM mmol/L 143   POTASSIUM mmol/L 4.5   CHLORIDE mmol/L 106   CO2 mmol/L 29   BUN mg/dL 19   CREATININE mg/dL 1.45*   ANION GAP mmol/L 8   CALCIUM mg/dL 9.5   ALBUMIN g/dL 4.2   TOTAL BILIRUBIN mg/dL 0.35   ALK PHOS U/L 86   ALT U/L 13   AST U/L 16   GLUCOSE  RANDOM mg/dL 120             Lab Results   Component Value Date    HGBA1C 6.1 (H) 04/09/2025    HGBA1C 6.0 02/12/2025    HGBA1C 7.4 (H) 09/03/2024     Results from last 7 days   Lab Units 06/20/25  1144   LACTIC ACID mmol/L 0.5             Administrative Statements       ** Please Note: This note has been constructed using a voice recognition system. 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[facility])     Heart failure, systolic, due to idiopathic cardiomyopathy ([facility]) 05/21/2018    Hyperlipidemia     Hypertension     IBD (inflammatory bowel disease)     Myocardial infarction ([facility])     Old myocardial infarction     Paroxysmal atrial fibrillation ([facility]) 08/24/2018    Perforation of colon ([facility])     Pericarditis 12/07/2016    Type 2 diabetes, diet controlled ([facility])     Ulcerative colitis ([facility])     VT (ventricular tachycardia) ([facility]) 12/08/2023   [2]   Past Surgical History:  Procedure Laterality Date    ANGIOPLASTY      APPENDECTOMY      COLON SURGERY      COLONOSCOPY N/A 10/24/2016    Procedure: COLONOSCOPY;  Surgeon: Tirso Juárez MD;  Location: [facility];  Service:     CORONARY ANGIOPLASTY WITH STENT PLACEMENT      ESOPHAGOGASTRODUODENOSCOPY N/A 10/24/2016    Procedure: ESOPHAGOGASTRODUODENOSCOPY (EGD);  Surgeon: Tirso Juárez MD;  Location: [facility];  Service:     EXPLORATORY LAPAROTOMY W/ BOWEL RESECTION N/A 5/22/2018    Procedure: EXPLORATORY LAPAROTOMY, ILIOCOLECTOMY, ILIOCOLONIC ANASTAMOSIS, LOOP ILIOSTOMY, REPAIR OF SEROSAL TEAR, EXTENSIVE LYSIS OF ADHESIONS, WOUND  VAC PLACEMENT;  Surgeon: Tirso Juárez MD;  Location: [facility];  Service: Colorectal    HEMICOLECTOMY      ILEOSTOMY CLOSURE N/A 10/5/2018    Procedure: CLOSURE ILEOSTOMY;  Surgeon: Tirso Juárez MD;  [facility]: [facility];  Service: Colorectal    OTHER SURGICAL HISTORY      stent indications acute myocardial infarction    AL COLONOSCOPY FLX DX W/COLLJ SPEC WHEN PFRMD N/A 2018    Procedure: COLONOSCOPY;  Surgeon: Tirso Juárez MD;  [facility]: [facility];  Service: Colorectal    AL COLONOSCOPY FLX DX W/COLLJ SPEC WHEN PFRMD N/A 10/4/2018    Procedure: COLONOSCOPY;  Surgeon: Tirso Juárez MD;  [facility]: [facility];  Service: Colorectal    TONSILLECTOMY     [3]   Social History  Tobacco Use    Smoking status: Former     Types: Cigars, Cigarettes     Start date: 10/31/2024     Quit date:      Years since quittin.5     Passive exposure: Past    Smokeless tobacco: Never    Tobacco comments:     4-5cigars per day before nowadays smokes about 1 cigar, trying to cut down.   Vaping Use    Vaping status: Never Used   Substance and Sexual Activity    Alcohol use: Not Currently     Comment: last drink may 2023    Drug use: No    Sexual activity: Yes     Partners: Female     Birth control/protection: None

## 2025-06-20 NOTE — ASSESSMENT & PLAN NOTE
"Lab Results   Component Value Date    HGBA1C 6.1 (H) 04/09/2025   At home on metformin 750 mg daily, will hold while NPO  Insulin sliding scale with fingersticks every 6 hours      No results for input(s): \"POCGLU\" in the last 72 hours.    Blood Sugar Average: Last 72 hrs:      "

## 2025-06-20 NOTE — UTILIZATION REVIEW
NOTIFICATION OF INPATIENT ADMISSION   AUTHORIZATION REQUEST   SERVICING FACILITY:   [facility]ly [facility]  Address: [address] [place] [address]  Tax ID: [id number]  NPI: [id number] ATTENDING PROVIDER:  Attending Name and NPI#: Beatrice Jin Md [[id number]]  Address: [address] [place] [address]  Phone: [phone]   ADMISSION INFORMATION:  Place of Service: Inpatient [facility]  Place of Service Code: 21  Inpatient Admission Date/Time: 6/20/25  3:57 PM  Discharge Date/Time: No discharge date for patient encounter.  Admitting Diagnosis Code/Description:  No admission diagnoses are documented for this encounter.     UTILIZATION REVIEW CONTACT:  Srinivasan Jimenez Utilization   Network Utilization Review Department  Phone: [phone]  Fax: [phone]  Email: [email or web address]  Contact for approvals/pending authorizations, clinical reviews, and discharge.     PHYSICIAN ADVISORY SERVICES:  Medical Necessity Denial & [facility] Review  Phone: [phone]  Fax: [phone]  Email: [email or web address]     DISCHARGE SUPPORT TEAM:  For Patients Discharge Needs & Updates  Phone: [phone] opt. 2 Fax: [phone]  Email: [email or web address]

## 2025-06-20 NOTE — ED PROVIDER NOTES
Time reflects when diagnosis was documented in both MDM as applicable and the Disposition within this note       Time User Action Codes Description Comment    6/20/2025 11:33 AM Willi Baltazar Add [K56.609] SBO (small bowel obstruction) ([facility])           ED Disposition       ED Disposition   Admit    Condition   Stable    Date/Time   Fri Jun 20, 2025  3:56 PM    Comment   Case was discussed with ZULEYKA and the patient's admission status was agreed to be Admission Status: inpatient status to the service of Dr. Jin .               Assessment & Plan       Medical Decision Making  Given multiple prior histories of abdominal surgeries and prior small bowel obstruction differentials include SBO, ileus, colitis, abscess, perforation, unlikely to be mesenteric ischemia, SMA syndrome.  Will workup with CT abdomen pelvis and abdominal laboratory workup.  CT abdomen pelvis was concerning for partial small bowel obstruction so I consulted surgery.  Surgery came and evaluated and offered the patient an NG tube however the patient declined and then surgery stated that they were not offering the patient admission however they did put in CRP and noted that if the CRP were to be elevated patient could be a candidate for inpatient medicine.  Given continued abdominal pain that required multiple doses of pain medication and concerning CT findings I reached out to hospitalist for admission for serial abdominal exams and possible small bowel obstruction.  Patient did tolerate p.o. intake in the department.  Hemodynamically he remained stable and was stable at the time of admission.    Amount and/or Complexity of Data Reviewed  Labs: ordered.  Radiology: ordered.    Risk  OTC drugs.  Prescription drug management.  Decision regarding hospitalization.             Medications   albuterol inhalation solution 2.5 mg (has no administration in time range)   metoprolol succinate (TOPROL-XL) 24 hr tablet 25 mg (has no administration in time range)    ondansetron (ZOFRAN) injection 4 mg (has no administration in time range)   fluticasone-vilanterol 200-25 mcg/actuation 1 puff (has no administration in time range)     And   umeclidinium 62.5 mcg/actuation inhaler AEPB 1 puff (has no administration in time range)   ondansetron (ZOFRAN) injection 4 mg (4 mg Intravenous Given 6/20/25 0941)   HYDROmorphone (DILAUDID) injection 0.5 mg (0.5 mg Intravenous Given 6/20/25 0942)   sodium chloride 0.9 % bolus 500 mL (0 mL Intravenous Stopped 6/20/25 1447)   oxymetazoline (AFRIN) 0.05 % nasal spray 2 spray (2 sprays Each Nare Given 6/20/25 0942)   iohexol (OMNIPAQUE) 350 MG/ML injection (MULTI-DOSE) 100 mL (100 mL Intravenous Given 6/20/25 1049)   HYDROmorphone (DILAUDID) injection 1 mg (1 mg Intravenous Given 6/20/25 1145)       ED Risk Strat Scores                    No data recorded                            History of Present Illness       Chief Complaint   Patient presents with    Abdominal Pain     RLQ for the past 2 days. Patient reports recently starting ozempic.       Past Medical History[1]   Past Surgical History[2]   Family History[3]   Social History[4]   E-Cigarette/Vaping    E-Cigarette Use Never User       E-Cigarette/Vaping Substances    Nicotine No     THC No     CBD No     Flavoring No     Other No     Unknown No       I have reviewed and agree with the history as documented.     Patient is a 65-year-old male prior history abdominal surgeries coming complaining of right lower quadrant abdominal pain that is also generalized in nature and started yesterday.  He states the pain is consistent and sharp.  Denies any fevers, chest pain, shortness of breath.  Denies any UTI symptoms.  Patient states he has had a colostomy bag with reversal.  And he states he has had prior obstructions in the past.  Denies any vomiting or nausea.  States that he did have a bowel movement this morning that was normal and still passing gas.      Abdominal Pain  Associated  symptoms: no chest pain, no cough, no dysuria, no fever, no shortness of breath and no vomiting        Review of Systems   Constitutional:  Negative for fever.   Respiratory:  Negative for cough and shortness of breath.    Cardiovascular:  Negative for chest pain and palpitations.   Gastrointestinal:  Positive for abdominal pain. Negative for vomiting.   Genitourinary:  Negative for dysuria.   Skin:  Negative for rash.   Neurological:  Negative for syncope.   All other systems reviewed and are negative.          Objective       ED Triage Vitals [06/20/25 0844]   Temperature Pulse Blood Pressure Respirations SpO2 Patient Position - Orthostatic VS   97.7 °F (36.5 °C) 93 133/65 20 98 % Sitting      Temp Source Heart Rate Source BP Location FiO2 (%) Pain Score    Oral Monitor Right arm -- 8      Vitals      Date and Time Temp Pulse SpO2 Resp BP Pain Score FACES Pain Rating User   06/22/25 0723 99.6 °F (37.6 °C) 112 86 % -- 98/59 -- -- DII   06/22/25 0552 -- -- -- -- -- 10 - Worst Possible Pain -- SW   06/21/25 2111 99.2 °F (37.3 °C) -- -- -- 109/63 -- -- DII   06/21/25 2110 99.2 °F (37.3 °C) 96 92 % -- 109/63 -- -- DII   06/21/25 2030 -- -- -- -- -- 10 - Worst Possible Pain -- SW   06/21/25 1616 -- -- -- -- -- 7 -- SI   06/21/25 1557 98.5 °F (36.9 °C) 79 93 % -- 121/63 -- -- DII   06/21/25 1414 -- -- -- -- -- 5 -- SI   06/21/25 0851 -- -- -- -- -- 8 -- SI   06/21/25 0724 98.7 °F (37.1 °C) 77 96 % -- 99/53 -- -- DII   06/21/25 0503 -- -- -- 20 -- 7 -- AMG   06/20/25 2251 -- -- -- -- -- 8 -- AMG   06/20/25 2132 97.5 °F (36.4 °C) 91 97 % -- 115/67 -- -- DII   06/20/25 2131 97.5 °F (36.4 °C) 90 96 % -- 115/67 -- -- DII   06/20/25 1858 98.2 °F (36.8 °C) -- -- 20 119/70 -- -- DII   06/20/25 1857 98.2 °F (36.8 °C) -- -- -- 119/70 -- -- DII   06/20/25 1855 -- -- -- -- -- 10 - Worst Possible Pain -- NQ   06/20/25 1500 -- 90 99 % 18 140/70 -- -- SM   06/20/25 1400 -- 90 98 % 18 139/73 -- -- HB   06/20/25 1200 -- 84 99 % -- 125/78  -- -- HB   06/20/25 1148 -- 86 99 % 18 107/64 7 -- HB   06/20/25 1145 -- -- -- -- -- 7 -- HB   06/20/25 0844 97.7 °F (36.5 °C) 93 98 % 20 133/65 8 -- HB            Physical Exam  Vitals and nursing note reviewed.   Constitutional:       General: He is not in acute distress.     Appearance: He is well-developed.   HENT:      Head: Normocephalic and atraumatic.     Eyes:      Conjunctiva/sclera: Conjunctivae normal.       Cardiovascular:      Rate and Rhythm: Normal rate and regular rhythm.   Pulmonary:      Effort: Pulmonary effort is normal. No respiratory distress.   Abdominal:      General: There is distension.      Palpations: Abdomen is soft.      Tenderness: There is generalized abdominal tenderness and tenderness in the right upper quadrant.      Comments: Mild distention.  No pain or proportion.  nontympanic.  No guarding no rebound.     Musculoskeletal:         General: Normal range of motion.      Cervical back: Normal range of motion.     Skin:     Capillary Refill: Capillary refill takes less than 2 seconds.     Neurological:      Mental Status: He is alert and oriented to person, place, and time. Mental status is at baseline.     Psychiatric:         Mood and Affect: Mood normal.         Results Reviewed       Procedure Component Value Units Date/Time    Basic metabolic panel [[id number]]  (Abnormal) Collected: 06/21/25 0509    Lab Status: Final result Specimen: Blood from Arm, Right Updated: 06/21/25 0550     Sodium 139 mmol/L      Potassium 4.0 mmol/L      Chloride 105 mmol/L      CO2 27 mmol/L      ANION GAP 7 mmol/L      BUN 20 mg/dL      Creatinine 1.47 mg/dL      Glucose 132 mg/dL      Calcium 8.5 mg/dL      eGFR 50 ml/min/1.73sq m     Narrative:      National Kidney Disease Foundation guidelines for Chronic Kidney Disease (CKD):     Stage 1 with normal or high GFR (GFR > 90 mL/min/1.73 square meters)    Stage 2 Mild CKD (GFR = 60-89 mL/min/1.73 square meters)    Stage 3A Moderate CKD (GFR = 45-59  mL/min/1.73 square meters)    Stage 3B Moderate CKD (GFR = 30-44 mL/min/1.73 square meters)    Stage 4 Severe CKD (GFR = 15-29 mL/min/1.73 square meters)    Stage 5 End Stage CKD (GFR <15 mL/min/1.73 square meters)  Note: GFR calculation is accurate only with a steady state creatinine    Magnesium [[id number]]  (Abnormal) Collected: 06/21/25 0509    Lab Status: Final result Specimen: Blood from Arm, Right Updated: 06/21/25 0551     Magnesium 1.6 mg/dL     CBC (With Platelets) [[id number]]  (Abnormal) Collected: 06/21/25 0509    Lab Status: Final result Specimen: Blood from Arm, Right Updated: 06/21/25 0539     WBC 11.66 Thousand/uL      RBC 3.79 Million/uL      Hemoglobin 10.0 g/dL      Hematocrit 32.9 %      MCV 87 fL      MCH 26.4 pg      MCHC 30.4 g/dL      RDW 15.2 %      Platelets 176 Thousands/uL      MPV 11.6 fL     Fingerstick Glucose (POCT) [[id number]]  (Normal) Collected: 06/20/25 1803    Lab Status: Final result Specimen: Blood Updated: 06/20/25 1827     POC Glucose 118 mg/dl     C-reactive protein [[id number]]  (Abnormal) Collected: 06/20/25 1448    Lab Status: Final result Specimen: Blood from Arm, Left Updated: 06/20/25 1551     CRP 3.3 mg/L     Lactic acid, plasma (w/reflex if result > 2.0) [[id number]]  (Normal) Collected: 06/20/25 1144    Lab Status: Final result Specimen: Blood from Arm, Left Updated: 06/20/25 1214     LACTIC ACID 0.5 mmol/L     Narrative:      Result may be elevated if tourniquet was used during collection.    Comprehensive metabolic panel [[id number]]  (Abnormal) Collected: 06/20/25 0946    Lab Status: Final result Specimen: Blood from Arm, Left Updated: 06/20/25 1017     Sodium 143 mmol/L      Potassium 4.5 mmol/L      Chloride 106 mmol/L      CO2 29 mmol/L      ANION GAP 8 mmol/L      BUN 19 mg/dL      Creatinine 1.45 mg/dL      Glucose 120 mg/dL      Calcium 9.5 mg/dL      AST 16 U/L      ALT 13 U/L      Alkaline Phosphatase 86 U/L      Total Protein 7.5 g/dL      Albumin 4.2 g/dL       Total Bilirubin 0.35 mg/dL      eGFR 50 ml/min/1.73sq m     Narrative:      National Kidney Disease Foundation guidelines for Chronic Kidney Disease (CKD):     Stage 1 with normal or high GFR (GFR > 90 mL/min/1.73 square meters)    Stage 2 Mild CKD (GFR = 60-89 mL/min/1.73 square meters)    Stage 3A Moderate CKD (GFR = 45-59 mL/min/1.73 square meters)    Stage 3B Moderate CKD (GFR = 30-44 mL/min/1.73 square meters)    Stage 4 Severe CKD (GFR = 15-29 mL/min/1.73 square meters)    Stage 5 End Stage CKD (GFR <15 mL/min/1.73 square meters)  Note: GFR calculation is accurate only with a steady state creatinine    Lipase [[id number]]  (Normal) Collected: 06/20/25 0946    Lab Status: Final result Specimen: Blood from Arm, Left Updated: 06/20/25 1017     Lipase 29 u/L     CBC and differential [[id number]]  (Abnormal) Collected: 06/20/25 0946    Lab Status: Final result Specimen: Blood from Arm, Left Updated: 06/20/25 0954     WBC 14.42 Thousand/uL      RBC 4.15 Million/uL      Hemoglobin 11.0 g/dL      Hematocrit 35.7 %      MCV 86 fL      MCH 26.5 pg      MCHC 30.8 g/dL      RDW 15.1 %      MPV 11.5 fL      Platelets 189 Thousands/uL      nRBC 0 /100 WBCs      Segmented % 82 %      Immature Grans % 1 %      Lymphocytes % 8 %      Monocytes % 8 %      Eosinophils Relative 1 %      Basophils Relative 0 %      Absolute Neutrophils 11.80 Thousands/µL      Absolute Immature Grans 0.08 Thousand/uL      Absolute Lymphocytes 1.16 Thousands/µL      Absolute Monocytes 1.18 Thousand/µL      Eosinophils Absolute 0.16 Thousand/µL      Basophils Absolute 0.04 Thousands/µL             CT abdomen pelvis with contrast   Final Interpretation by Castillo Mas MD (06/20 1126)      Dilated small bowel loops proximal to the ileocolonic anastomosis measuring 5 cm. Partial obstruction in the differential. Possibly secondary to adhesions.      Surgical consult recommended.         Workstation performed: [id number]         XR abdomen  obstruction series    (Results Pending)       Procedures    ED Medication and Procedure Management   Prior to Admission Medications   Prescriptions Last Dose Informant Patient Reported? Taking?   Multiple Vitamins-Minerals (Centrum Silver 50+Men) TABS Unknown Self Yes No   Sig: Take by mouth   Nebulizers (AERONEB GO NEBULIZER HANDSET) MISC Unknown Self No No   Sig: by Does not apply route 2 (two) times a day as needed (wheezing)   albuterol (2.5 mg/3 mL) 0.083 % nebulizer solution Unknown Self No No   Sig: Take 3 mL (2.5 mg total) by nebulization every 4 (four) hours as needed for wheezing or shortness of breath   albuterol (PROVENTIL HFA,VENTOLIN HFA) 90 mcg/act inhaler Unknown Self No No   Sig: Inhale 2 puffs every 4 (four) hours as needed for wheezing   aspirin (Aspirin Low Dose) 81 mg chewable tablet Unknown Self No No   Sig: CHEW 1 TABLET BY MOUTH EVERY DAY   atorvastatin (LIPITOR) 40 mg tablet Unknown Self No No   Sig: TAKE 1 TABLET BY MOUTH EVERY DAY WITH DINNER   budesonide-formoterol (Symbicort) 160-4.5 mcg/act inhaler Unknown Self No No   Sig: Inhale 2 puffs daily as needed (for shortness of breath) Rinse mouth after use.   fluticasone-umeclidinium-vilanterol (Trelegy Ellipta) 200-62.5-25 mcg/actuation AEPB inhaler Unknown Self No No   Sig: Inhale 1 puff daily Rinse mouth after use.   ipratropium-albuterol (DUO-NEB) 0.5-2.5 mg/3 mL nebulizer solution Unknown Self No No   Sig: Take 3 mL by nebulization 3 (three) times a day   metFORMIN (GLUCOPHAGE-XR) 750 mg 24 hr tablet Unknown Self No No   Sig: Take 1 tablet (750 mg total) by mouth daily with breakfast   metoprolol succinate (TOPROL-XL) 25 mg 24 hr tablet Unknown Self No No   Sig: TAKE 1 TABLET (25 MG TOTAL) BY MOUTH DAILY AT BEDTIME DAILY AT BEDTIME   pantoprazole (PROTONIX) 40 mg tablet Unknown Self No No   Sig: TAKE 1 TABLET BY MOUTH EVERY DAY   polyethylene glycol (GOLYTELY) 4000 mL solution  Self No No   Sig: Take 4,000 mL by mouth once for 1 dose    semaglutide, 0.25 or 0.5 mg/dose, (Ozempic, 0.25 or 0.5 MG/DOSE,) 2 mg/3 mL injection pen Unknown  No No   Si.25 mg under the skin every 7 days for 4 doses (28 days), THEN 0.5 mg under the skin every 7 days   sulfaSALAzine (AZULFIDINE) 500 mg tablet 2025 Morning Self No Yes   Sig: TAKE 1 TABLET (500 MG TOTAL) BY MOUTH 4 (FOUR) TIMES A DAY.   traZODone (DESYREL) 50 mg tablet Unknown  No No   Sig: Take 2 tablets (100 mg total) by mouth daily at bedtime      Facility-Administered Medications: None     Current Discharge Medication List        CONTINUE these medications which have NOT CHANGED    Details   sulfaSALAzine (AZULFIDINE) 500 mg tablet TAKE 1 TABLET (500 MG TOTAL) BY MOUTH 4 (FOUR) TIMES A DAY.  Qty: 360 tablet, Refills: 0    Associated Diagnoses: Crohn's disease with other complication, unspecified gastrointestinal tract location ([facility])      albuterol (2.5 mg/3 mL) 0.083 % nebulizer solution Take 3 mL (2.5 mg total) by nebulization every 4 (four) hours as needed for wheezing or shortness of breath  Qty: 375 mL, Refills: 5    Comments: DX Code Needed  .  Associated Diagnoses: Stage 4 very severe COPD by GOLD classification ([facility])      albuterol (PROVENTIL HFA,VENTOLIN HFA) 90 mcg/act inhaler Inhale 2 puffs every 4 (four) hours as needed for wheezing  Qty: 18 g, Refills: 5    Comments: Substitution to a formulary equivalent within the same pharmaceutical class is authorized.  Associated Diagnoses: Stage 4 very severe COPD by GOLD classification ([facility])      aspirin (Aspirin Low Dose) 81 mg chewable tablet CHEW 1 TABLET BY MOUTH EVERY DAY  Qty: 90 tablet, Refills: 1    Associated Diagnoses: Coronary artery disease involving native coronary artery of native heart without angina pectoris      atorvastatin (LIPITOR) 40 mg tablet TAKE 1 TABLET BY MOUTH EVERY DAY WITH DINNER  Qty: 90 tablet, Refills: 1    Associated Diagnoses: Coronary artery disease involving native coronary artery of native heart without  angina pectoris      budesonide-formoterol (Symbicort) 160-4.5 mcg/act inhaler Inhale 2 puffs daily as needed (for shortness of breath) Rinse mouth after use.  Qty: 91.8 g, Refills: 1    Associated Diagnoses: Stage 4 very severe COPD by GOLD classification ([facility])      fluticasone-umeclidinium-vilanterol (Trelegy Ellipta) 200-62.5-25 mcg/actuation AEPB inhaler Inhale 1 puff daily Rinse mouth after use.  Qty: 180 blister, Refills: 1    Associated Diagnoses: Stage 4 very severe COPD by GOLD classification ([facility])      ipratropium-albuterol (DUO-NEB) 0.5-2.5 mg/3 mL nebulizer solution Take 3 mL by nebulization 3 (three) times a day  Qty: 810 mL, Refills: 2    Associated Diagnoses: Stage 4 very severe COPD by GOLD classification ([facility])      metFORMIN (GLUCOPHAGE-XR) 750 mg 24 hr tablet Take 1 tablet (750 mg total) by mouth daily with breakfast  Qty: 100 tablet, Refills: 3    Associated Diagnoses: Type 2 diabetes mellitus with hyperglycemia, without long-term current use of insulin ([facility])      metoprolol succinate (TOPROL-XL) 25 mg 24 hr tablet TAKE 1 TABLET (25 MG TOTAL) BY MOUTH DAILY AT BEDTIME DAILY AT BEDTIME  Qty: 90 tablet, Refills: 1    Associated Diagnoses: Tachycardia; Coronary artery disease involving native coronary artery of native heart without angina pectoris      Multiple Vitamins-Minerals (Centrum Silver 50+Men) TABS Take by mouth      Nebulizers (AERONEB GO NEBULIZER HANDSET) MISC by Does not apply route 2 (two) times a day as needed (wheezing)  Qty: 1 each, Refills: 0    Associated Diagnoses: Uncomplicated asthma, unspecified asthma severity, unspecified whether persistent      pantoprazole (PROTONIX) 40 mg tablet TAKE 1 TABLET BY MOUTH EVERY DAY  Qty: 90 tablet, Refills: 1    Associated Diagnoses: GI bleeding      polyethylene glycol (GOLYTELY) 4000 mL solution Take 4,000 mL by mouth once for 1 dose  Qty: 4000 mL, Refills: 0    Associated Diagnoses: Crohn's disease of both small and large intestine with  intestinal obstruction (HCC)      semaglutide, 0.25 or 0.5 mg/dose, (Ozempic, 0.25 or 0.5 MG/DOSE,) 2 mg/3 mL injection pen 0.25 mg under the skin every 7 days for 4 doses (28 days), THEN 0.5 mg under the skin every 7 days  Qty: 9 mL, Refills: 0    Associated Diagnoses: Type 2 diabetes mellitus without complication, without long-term current use of insulin (HCC)      traZODone (DESYREL) 50 mg tablet Take 2 tablets (100 mg total) by mouth daily at bedtime  Qty: 180 tablet, Refills: 2    Associated Diagnoses: Primary insomnia           No discharge procedures on file.  ED SEPSIS DOCUMENTATION   Time reflects when diagnosis was documented in both MDM as applicable and the Disposition within this note       Time User Action Codes Description Comment    6/20/2025 11:33 AM Willi Baltazar Add [K56.609] SBO (small bowel obstruction) ([facility])                      [1]   Past Medical History:  Diagnosis Date    DEBBY (acute kidney injury) ([facility]) 07/08/2018    Alcohol abuse 05/15/2018    Anxiety     Asthma     Bowel obstruction ([facility]) 11/01/2024    Cardiac disease     Cervical stenosis (uterine cervix)     Chest pain     Chronic kidney disease     Colitis     Colon polyps     COPD (chronic obstructive pulmonary disease) ([facility])     2L @ HS and PRN    Coronary artery disease     Diverticulitis     Esophageal reflux     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HCC)     Heart failure, systolic, due to idiopathic cardiomyopathy ([facility]) 05/21/2018    Hyperlipidemia     Hypertension     IBD (inflammatory bowel disease)     Myocardial infarction ([facility])     Old myocardial infarction     Paroxysmal atrial fibrillation (HCC) 08/24/2018    Perforation of colon (HCC)     Pericarditis 12/07/2016    Type 2 diabetes, diet controlled (HCC)     Ulcerative colitis (HCC)     VT (ventricular tachycardia) ([facility]) 12/08/2023   [2]   Past Surgical History:  Procedure Laterality Date    ANGIOPLASTY      APPENDECTOMY      COLON SURGERY      COLONOSCOPY N/A 10/24/2016    Procedure:  COLONOSCOPY;  Surgeon: Tirso Juárez MD;  Location: [facility];  Service:     CORONARY ANGIOPLASTY WITH STENT PLACEMENT      ESOPHAGOGASTRODUODENOSCOPY N/A 10/24/2016    Procedure: ESOPHAGOGASTRODUODENOSCOPY (EGD);  Surgeon: Tirso Juárez MD;  Location: [facility];  Service:     EXPLORATORY LAPAROTOMY W/ BOWEL RESECTION N/A 2018    Procedure: EXPLORATORY LAPAROTOMY, ILIOCOLECTOMY, ILIOCOLONIC ANASTAMOSIS, LOOP ILIOSTOMY, REPAIR OF SEROSAL TEAR, EXTENSIVE LYSIS OF ADHESIONS, WOUND VAC PLACEMENT;  Surgeon: Tirso Juárez MD;  Location: [facility];  Service: Colorectal    HEMICOLECTOMY      ILEOSTOMY CLOSURE N/A 10/5/2018    Procedure: CLOSURE ILEOSTOMY;  Surgeon: Tirso Juárez MD;  Location: [facility];  Service: Colorectal    OTHER SURGICAL HISTORY      stent indications acute myocardial infarction    NC COLONOSCOPY FLX DX W/COLLJ SPEC WHEN PFRMD N/A 2018    Procedure: COLONOSCOPY;  Surgeon: Tirso Juárez MD;  Location: [facility];  Service: Colorectal    NC COLONOSCOPY FLX DX W/COLLJ SPEC WHEN PFRMD N/A 10/4/2018    Procedure: COLONOSCOPY;  Surgeon: Tirso Juárez MD;  Location: [facility];  Service: Colorectal    TONSILLECTOMY     [3]   Family History  Problem Relation Name Age of Onset    Coronary artery disease Father      Crohn's disease Father      Stroke Father      Diabetes Family Unknown    [4]   Social History  Tobacco Use    Smoking status: Former     Types: Cigars, Cigarettes     Start date: 10/31/2024     Quit date:      Years since quittin.5     Passive exposure: Past    Smokeless tobacco: Never    Tobacco comments:     4-5cigars per day before nowadays smokes about 1 cigar, trying to cut down.   Vaping Use    Vaping status: Never Used   Substance Use Topics    Alcohol use: Not Currently     Comment: last drink may 2023    Drug use: No        Willi Baltazar,   25 1100

## 2025-06-20 NOTE — RESPIRATORY THERAPY NOTE
06/20/25 1736   Respiratory Protocol   Protocol Initiated? Yes   Protocol Selection Respiratory   Language Barrier? No   Medical & Social History Reviewed? Yes   Diagnostic Studies Reviewed? Yes   Physical Assessment Performed? Yes   Respiratory Plan No distress/Pulmonary history   Respiratory Assessment   Assessment Type Assess only   General Appearance Awake   Respiratory Pattern Normal   Chest Assessment Chest expansion symmetrical   Bilateral Breath Sounds Diminished   Resp Comments pt has Hx of COPD, No complaints of SOB, bilateral breath sounds diminished. Pt is admitted for Small bowel obstruction and Hx of crohns disease. Prn tx is ordered and will continue to monitor pt.

## 2025-06-20 NOTE — ASSESSMENT & PLAN NOTE
Patient is a 66 y/o M who presented to the ED with 1 day of RLQ abdominal pain. CT findings significant for dilated small bowel loops proximal to ileocolonic anastomosis measuring 5cm concerning for partial obstruction     - No acute surgical intervention. Patient is having bowel movements and passing flatus. Patient without nausea or vomiting. Patient was tolerating PO intake prior to coming to the hospital.   - CRP lab  - PO trial. If patient able to tolerate PO diet without increasing pain, nausea, or vomiting patient may be discharged from the ED

## 2025-06-20 NOTE — CONSULTS
Consultation - Surgery-General   Name: Mathew Bird 65 y.o. male I MRN: [id number]  Unit/Bed#: ED 24 I Date of Admission: 6/20/2025   Date of Service: 6/20/2025 I Hospital Day: 0   Consults  Physician Requesting Evaluation: Ugo Delgadillo MD   Reason for Evaluation / Principal Problem: SBO    Assessment & Plan  SBO (small bowel obstruction) (HCC)  Patient is a 64 y/o M who presented to the ED with 1 day of RLQ abdominal pain. CT findings significant for dilated small bowel loops proximal to ileocolonic anastomosis measuring 5cm concerning for partial obstruction     - No acute surgical intervention. Patient is having bowel movements and passing flatus. Patient without nausea or vomiting. Patient was tolerating PO intake prior to coming to the hospital.   - CRP lab  - PO trial. If patient able to tolerate PO diet without increasing pain, nausea, or vomiting patient may be discharged from the ED        History of Present Illness   Mathew Bird is a 65 y.o. male with PMH of Crohn's disease s/p ilela colectomy with anastomosis and ileostomy which has been reversed October 2018 who presented to the ED with 1 day of abdominal pain. Patients states the pain is located in his right lower quadrant. Patient states this pain feels similar to his previous SBO. Patient was recently admitted under the General Surgery Service in November of 2024 with SBO and was treated conservatively. Last BM was this morning. Patient states he is passing a lot of flatus. Drank soda this morning, last meal was at dinner. Previous abdominal surgeries include ex lap with ileocolectomy, ileocolonic anastomosis in May 2018 followed by ileostomy closure in October 2018 with colorectal team. Admits to taking 81mg ASA daily but denies any other anticoagulant or antiplatelet use. Patient denies fevers, chills, nausea, vomiting, constipation, dysuria, hematuria, SOB, and chest pain.    Review of Systems   Constitutional:  Negative for chills  and fever.   HENT: Negative.     Respiratory:  Negative for cough, chest tightness and shortness of breath.    Cardiovascular:  Negative for chest pain.   Gastrointestinal:  Positive for abdominal distention and abdominal pain. Negative for blood in stool, constipation, diarrhea, nausea and vomiting.   Genitourinary:  Negative for difficulty urinating, dysuria, frequency and hematuria.   Skin:  Negative for rash.   Psychiatric/Behavioral:  Negative for agitation and behavioral problems.      Medical History Review: I have reviewed the patient's PMH, PSH, Social History, Family History, Meds, and Allergies     Objective :  Temp:  [97.7 °F (36.5 °C)] 97.7 °F (36.5 °C)  HR:  [84-93] 84  BP: (107-133)/(64-78) 125/78  Resp:  [18-20] 18  SpO2:  [98 %-99 %] 99 %  O2 Device: Nasal cannula  Nasal Cannula O2 Flow Rate (L/min):  [4 L/min] 4 L/min      Physical Exam  Constitutional:       General: He is not in acute distress.     Appearance: Normal appearance. He is not ill-appearing or toxic-appearing.   HENT:      Head: Normocephalic and atraumatic.     Cardiovascular:      Rate and Rhythm: Normal rate.      Pulses: Normal pulses.      Heart sounds: Normal heart sounds.   Pulmonary:      Effort: Pulmonary effort is normal. No respiratory distress.      Comments: 4L NC  Abdominal:      Tenderness: There is abdominal tenderness (Minimal RLQ tenderness.). There is no right CVA tenderness, left CVA tenderness, guarding or rebound.      Comments: Large Ventral Hernia present that is soft, nontender, and no underlying skin changes.     Musculoskeletal:         General: Normal range of motion.     Skin:     General: Skin is warm and dry.      Capillary Refill: Capillary refill takes less than 2 seconds.     Neurological:      General: No focal deficit present.      Mental Status: He is alert and oriented to person, place, and time. Mental status is at baseline.     Psychiatric:         Mood and Affect: Mood normal.         Behavior:  Behavior normal.         Lab Results: I have reviewed the following results:  Recent Labs     06/20/25  0946 06/20/25  1144   WBC 14.42*  --    HGB 11.0*  --    HCT 35.7*  --      --    SODIUM 143  --    K 4.5  --      --    CO2 29  --    BUN 19  --    CREATININE 1.45*  --    GLUC 120  --    AST 16  --    ALT 13  --    ALB 4.2  --    TBILI 0.35  --    ALKPHOS 86  --    LACTICACID  --  0.5       Imaging Results Review: I reviewed radiology reports from this admission including: CT abdomen/pelvis.  Other Study Results Review: No additional pertinent studies reviewed.    Pooja Zapien PA-C

## 2025-06-21 ENCOUNTER — APPOINTMENT (INPATIENT)
Dept: RADIOLOGY | Facility: HOSPITAL | Age: 65
DRG: 386 | End: 2025-06-21
Payer: COMMERCIAL

## 2025-06-21 LAB
ANION GAP SERPL CALCULATED.3IONS-SCNC: 7 MMOL/L (ref 4–13)
BUN SERPL-MCNC: 20 MG/DL (ref 5–25)
CALCIUM SERPL-MCNC: 8.5 MG/DL (ref 8.4–10.2)
CHLORIDE SERPL-SCNC: 105 MMOL/L (ref 96–108)
CO2 SERPL-SCNC: 27 MMOL/L (ref 21–32)
CREAT SERPL-MCNC: 1.47 MG/DL (ref 0.6–1.3)
ERYTHROCYTE [DISTWIDTH] IN BLOOD BY AUTOMATED COUNT: 15.2 % (ref 11.6–15.1)
GFR SERPL CREATININE-BSD FRML MDRD: 50 ML/MIN/1.73SQ M
GLUCOSE SERPL-MCNC: 109 MG/DL (ref 65–140)
GLUCOSE SERPL-MCNC: 111 MG/DL (ref 65–140)
GLUCOSE SERPL-MCNC: 124 MG/DL (ref 65–140)
GLUCOSE SERPL-MCNC: 124 MG/DL (ref 65–140)
GLUCOSE SERPL-MCNC: 132 MG/DL (ref 65–140)
GLUCOSE SERPL-MCNC: 95 MG/DL (ref 65–140)
HCT VFR BLD AUTO: 32.9 % (ref 36.5–49.3)
HGB BLD-MCNC: 10 G/DL (ref 12–17)
MAGNESIUM SERPL-MCNC: 1.6 MG/DL (ref 1.9–2.7)
MCH RBC QN AUTO: 26.4 PG (ref 26.8–34.3)
MCHC RBC AUTO-ENTMCNC: 30.4 G/DL (ref 31.4–37.4)
MCV RBC AUTO: 87 FL (ref 82–98)
PLATELET # BLD AUTO: 176 THOUSANDS/UL (ref 149–390)
PMV BLD AUTO: 11.6 FL (ref 8.9–12.7)
POTASSIUM SERPL-SCNC: 4 MMOL/L (ref 3.5–5.3)
RBC # BLD AUTO: 3.79 MILLION/UL (ref 3.88–5.62)
SODIUM SERPL-SCNC: 139 MMOL/L (ref 135–147)
WBC # BLD AUTO: 11.66 THOUSAND/UL (ref 4.31–10.16)

## 2025-06-21 PROCEDURE — 99232 SBSQ HOSP IP/OBS MODERATE 35: CPT | Performed by: INTERNAL MEDICINE

## 2025-06-21 PROCEDURE — 94664 DEMO&/EVAL PT USE INHALER: CPT

## 2025-06-21 PROCEDURE — 94640 AIRWAY INHALATION TREATMENT: CPT

## 2025-06-21 PROCEDURE — 85027 COMPLETE CBC AUTOMATED: CPT | Performed by: INTERNAL MEDICINE

## 2025-06-21 PROCEDURE — 80048 BASIC METABOLIC PNL TOTAL CA: CPT | Performed by: INTERNAL MEDICINE

## 2025-06-21 PROCEDURE — 94760 N-INVAS EAR/PLS OXIMETRY 1: CPT

## 2025-06-21 PROCEDURE — 74022 RADEX COMPL AQT ABD SERIES: CPT

## 2025-06-21 PROCEDURE — 82948 REAGENT STRIP/BLOOD GLUCOSE: CPT

## 2025-06-21 PROCEDURE — 83735 ASSAY OF MAGNESIUM: CPT | Performed by: INTERNAL MEDICINE

## 2025-06-21 RX ORDER — MORPHINE SULFATE 4 MG/ML
4 INJECTION, SOLUTION INTRAMUSCULAR; INTRAVENOUS EVERY 6 HOURS PRN
Status: DISCONTINUED | OUTPATIENT
Start: 2025-06-21 | End: 2025-06-25 | Stop reason: HOSPADM

## 2025-06-21 RX ORDER — ENOXAPARIN SODIUM 100 MG/ML
40 INJECTION SUBCUTANEOUS
Status: DISCONTINUED | OUTPATIENT
Start: 2025-06-21 | End: 2025-06-25 | Stop reason: HOSPADM

## 2025-06-21 RX ORDER — ASPIRIN 81 MG/1
81 TABLET, CHEWABLE ORAL DAILY
Status: DISCONTINUED | OUTPATIENT
Start: 2025-06-22 | End: 2025-06-25 | Stop reason: HOSPADM

## 2025-06-21 RX ORDER — MAGNESIUM SULFATE HEPTAHYDRATE 40 MG/ML
4 INJECTION, SOLUTION INTRAVENOUS ONCE
Status: COMPLETED | OUTPATIENT
Start: 2025-06-21 | End: 2025-06-21

## 2025-06-21 RX ADMIN — MAGNESIUM SULFATE HEPTAHYDRATE 4 G: 40 INJECTION, SOLUTION INTRAVENOUS at 10:38

## 2025-06-21 RX ADMIN — MORPHINE SULFATE 2 MG: 2 INJECTION, SOLUTION INTRAMUSCULAR; INTRAVENOUS at 16:16

## 2025-06-21 RX ADMIN — ALBUTEROL SULFATE 2.5 MG: 2.5 SOLUTION RESPIRATORY (INHALATION) at 20:50

## 2025-06-21 RX ADMIN — ATORVASTATIN CALCIUM 40 MG: 40 TABLET, FILM COATED ORAL at 16:17

## 2025-06-21 RX ADMIN — MORPHINE SULFATE 4 MG: 4 INJECTION INTRAVENOUS at 20:30

## 2025-06-21 RX ADMIN — UMECLIDINIUM 1 PUFF: 62.5 AEROSOL, POWDER ORAL at 08:45

## 2025-06-21 RX ADMIN — ENOXAPARIN SODIUM 40 MG: 40 INJECTION SUBCUTANEOUS at 13:11

## 2025-06-21 RX ADMIN — MORPHINE SULFATE 2 MG: 2 INJECTION, SOLUTION INTRAMUSCULAR; INTRAVENOUS at 05:03

## 2025-06-21 RX ADMIN — TRAZODONE HYDROCHLORIDE 100 MG: 100 TABLET ORAL at 21:17

## 2025-06-21 RX ADMIN — MORPHINE SULFATE 4 MG: 4 INJECTION INTRAVENOUS at 08:51

## 2025-06-21 RX ADMIN — PANTOPRAZOLE SODIUM 40 MG: 40 TABLET, DELAYED RELEASE ORAL at 08:45

## 2025-06-21 RX ADMIN — FLUTICASONE FUROATE AND VILANTEROL TRIFENATATE 1 PUFF: 200; 25 POWDER RESPIRATORY (INHALATION) at 08:45

## 2025-06-21 NOTE — ASSESSMENT & PLAN NOTE
Patient presents with abdominal pain nausea  CT findings significant for dilated small bowel loops proximal to ileocolonic anastomosis measuring 5cm concerning for partial obstruction  Presently on liquid diet  Surgery following, no interventions planned  Antiemetics, analgesics, supportive cares  Monitor closely

## 2025-06-21 NOTE — PLAN OF CARE
Problem: PAIN - ADULT  Goal: Verbalizes/displays adequate comfort level or baseline comfort level  Description: Interventions:  - Encourage patient to monitor pain and request assistance  - Assess pain using appropriate pain scale  - Administer analgesics as ordered based on type and severity of pain and evaluate response  - Implement non-pharmacological measures as appropriate and evaluate response  - Consider cultural and social influences on pain and pain management  - Notify physician/advanced practitioner if interventions unsuccessful or patient reports new pain  - Educate patient/family on pain management process including their role and importance of  reporting pain   - Provide non-pharmacologic/complimentary pain relief interventions  Outcome: Progressing     Problem: INFECTION - ADULT  Goal: Absence or prevention of progression during hospitalization  Description: INTERVENTIONS:  - Assess and monitor for signs and symptoms of infection  - Monitor lab/diagnostic results  - Monitor all insertion sites, i.e. indwelling lines, tubes, and drains  - Monitor endotracheal if appropriate and nasal secretions for changes in amount and color  - Preston appropriate cooling/warming therapies per order  - Administer medications as ordered  - Instruct and encourage patient and family to use good hand hygiene technique  - Identify and instruct in appropriate isolation precautions for identified infection/condition  Outcome: Progressing

## 2025-06-21 NOTE — CASE MANAGEMENT
Case Management Assessment & Discharge Planning Note    Patient name Mathew Bird  Location /-01 MRN [id number]  : 1960 Date 2025       Current Admission Date: 2025  Current Admission Diagnosis:SBO (small bowel obstruction) ([facility])   Patient Active Problem List    Diagnosis Date Noted    SBO (small bowel obstruction) ([facility]) 2025    Abnormal CT of the chest 2024    Type 2 diabetes mellitus, without long-term current use of insulin ([facility]) 2024    Ventricular tachycardia ([facility]) 2023    Bladder wall thickening 09/15/2023    Multiple pulmonary nodules 2023    Chronic respiratory failure with hypoxia ([facility]) 2023    Abnormal liver function 2023    Status post reversal of ileostomy 10/25/2018    Presence of drug-eluting stent in right coronary artery 2018    Paroxysmal atrial fibrillation ([facility]) 2018    Macrocytic anemia 2018    Moderate protein-calorie malnutrition ([facility]) 2018    Cigarette nicotine dependence without complication 05/15/2018    Stage 4 very severe COPD by GOLD classification ([facility]) 2017    Spinal stenosis of cervical region 2016    Coronary artery disease involving native coronary artery of native heart without angina pectoris 2016    Crohn disease ([facility]) 10/22/2016    Atherosclerosis of coronary artery 2016    Dyslipidemia 10/19/2013      LOS (days): 1  Geometric Mean LOS (GMLOS) (days):   Days to GMLOS:     OBJECTIVE:    Risk of Unplanned Readmission Score: 11.43         Current admission status: Inpatient       Preferred Pharmacy:   [id number] - [place] - [address] [place] [address]  [place] [address]  Phone: [phone] Fax: [phone]    [facility] [place] - [address]  [address]  [place] [address]  Phone: [phone] Fax: [phone]    Primary Care Provider: Mariola Doyle MD    Primary Insurance:  [facility]  Secondary Insurance:     ASSESSMENT:  Active Health Care Proxies       Amparo Adame [facility] Representative - Child   Primary Phone: [phone] (Home)                 Patient Information  Admitted from:: Home  Mental Status: Alert  During Assessment patient was accompanied by: Not accompanied during assessment  Assessment information provided by:: Patient  Primary Caregiver: Self  Support Systems: Self, Family members, Daughter  What city do you live in?: [place]  Home entry access options. Select all that apply.: No steps to enter home  Type of Current Residence: 2 story home  Upon entering residence, is there a bedroom on the main floor (no further steps)?: No  A bedroom is located on the following floor levels of residence (select all that apply):: 2nd Floor  Upon entering residence, is there a bathroom on the main floor (no further steps)?: No  Indicate which floors of current residence have a bathroom (select all the apply):: 2nd Floor  Number of steps to 2nd floor from main floor: One Flight  Living Arrangements: Lives w/ Daughter, Other (Comment)  Is patient a ?: No    Activities of Daily Living Prior to Admission  Functional Status: Independent  Completes ADLs independently?: Yes  Ambulates independently?: Yes  Does patient use assisted devices?: Yes  Assisted Devices (DME) used: Home Oxygen concentrator, Portable Oxygen tanks, Walker, Straight Cane, Bedside Commode  O2 Rate(s): 4  Does patient have a history of Outpatient Therapy (PT/OT)?: No  Does the patient have a history of Short-Term Rehab?: No  Does patient have a history of HHC?: No  Does patient currently have HHC?: No    Patient Information Continued  Income Source: SSI/SSD  Does patient have prescription coverage?: Yes  Can the patient afford their medications and any related supplies (such as glucometers or test strips)?: Yes  Does patient receive dialysis treatments?: No  Does patient have a history of substance  abuse?: Yes  Historical substance use preference: Alcohol/ETOH, Marijuana  Is patient currently in treatment for substance abuse?: N/A - sober  Does patient have a history of Mental Health Diagnosis?: No    Means of Transportation  Means of Transport to Appts:: Drives Self      DISCHARGE DETAILS:    Discharge planning discussed with:: Patient  Freedom of Choice: Yes  Comments - Freedom of Choice: Discussed FOC  CM contacted family/caregiver?: No- see comments (declined)    Other Referral/Resources/Interventions Provided:  Referral Comments: This CM introduced self and role to patient, lives with daughter in 3 story home, no KAREN, bathroom and bedroom located on 2nd floor (FFOS).  Has walker, cane, home O2 set up (uses 4L 24/7), and commode at home.  Uses walker when ambulating long distances.  Reports no history of OP therapy, HH or STR.  Reports history of marijuana and ETOH, now sober.  Drives self

## 2025-06-21 NOTE — PROGRESS NOTES
Progress Note - Hospitalist   Name: Mathew Bird 65 y.o. male I MRN: [id number]  Unit/Bed#: -01 I Date of Admission: 6/20/2025   Date of Service: 6/21/2025 I Hospital Day: 1     Assessment & Plan  SBO (small bowel obstruction) ([facility])  Patient presents with abdominal pain nausea  CT findings significant for dilated small bowel loops proximal to ileocolonic anastomosis measuring 5cm concerning for partial obstruction  Presently on liquid diet  Surgery following, no interventions planned  Antiemetics, analgesics, supportive cares  Monitor closely  Coronary artery disease involving native coronary artery of native heart without angina pectoris  Continue aspirin, statin, metoprolol  Stage 4 very severe COPD by GOLD classification ([facility])  Continue Trelegy, DuoNebs  Paroxysmal atrial fibrillation ([facility])  Not on anticoagulation  Continue metoprolol  Type 2 diabetes mellitus, without long-term current use of insulin ([facility])  Lab Results   Component Value Date    HGBA1C 6.1 (H) 04/09/2025   At home on metformin 750 mg daily  Diabetes mellitus type 2 controlled  Monitor Accu-Cheks  Avoid hypoglycemia      Recent Labs     06/20/25  1803 06/21/25  0557 06/21/25  0709 06/21/25  1101   POCGLU 118 124 124 95       Blood Sugar Average: Last 72 hrs:  (P) 115.25    Crohn disease ([facility])  s/p ilela colectomy with anastomosis and ileostomy which has been reversed October 2018   Supportive cares                  VTE Pharmacologic Prophylaxis:   High Risk (Score >/= 5) - Pharmacological DVT Prophylaxis Ordered: enoxaparin (Lovenox). Sequential Compression Devices Ordered.    Mobility:   Basic Mobility Inpatient Raw Score: 24  JH-HLM Goal: 8: Walk 250 feet or more  JH-HLM Achieved: 8: Walk 250 feet ot more  JH-HLM Goal NOT achieved. Continue with multidisciplinary rounding and encourage appropriate mobility to improve upon JH-HLM goals.    Patient Centered Rounds: I performed bedside rounds with nursing staff today.   Discussions with  Specialists or Other Care Team Provider: Case management    Education and Discussions with Family / Patient: Discussed with the patient, call left message for daughter Amparo.     Current Length of Stay: 1 day(s)  Current Patient Status: Inpatient   Certification Statement: The patient will continue to require additional inpatient hospital stay due to abdominal pain nausea needs close monitoring presently on clear liquid diet  Discharge Plan: Awaiting clinical and symptomatic improvement    Code Status: Level 1 - Full Code    Subjective     Comfortably in bed  Reports abdominal pain slightly better than yesterday  Nausea slightly better  Encourage out of bed and ambulation with assistance        Objective :  Temp:  [97.5 °F (36.4 °C)-98.7 °F (37.1 °C)] 98.7 °F (37.1 °C)  HR:  [77-91] 77  BP: ()/(53-73) 99/53  Resp:  [18-20] 20  SpO2:  [96 %-99 %] 96 %  O2 Device: None (Room air)    Body mass index is 31.99 kg/m².     Input and Output Summary (last 24 hours):     Intake/Output Summary (Last 24 hours) at 6/21/2025 1257  Last data filed at 6/20/2025 1447  Gross per 24 hour   Intake 500 ml   Output --   Net 500 ml       Physical Exam    Comfortably in bed  Obese  Short thick neck  Lungs diminished breath sounds  Heart sounds S1-S2 noted  Abdomen soft  Mild diffuse tenderness  Awake follows commands  No pedal edema  No rash    Lines/Drains:              Lab Results: I have reviewed the following results:   Results from last 7 days   Lab Units 06/21/25  0509 06/20/25  0946   WBC Thousand/uL 11.66* 14.42*   HEMOGLOBIN g/dL 10.0* 11.0*   HEMATOCRIT % 32.9* 35.7*   PLATELETS Thousands/uL 176 189   SEGS PCT %  --  82*   LYMPHO PCT %  --  8*   MONO PCT %  --  8   EOS PCT %  --  1     Results from last 7 days   Lab Units 06/21/25  0509 06/20/25  0946   SODIUM mmol/L 139 143   POTASSIUM mmol/L 4.0 4.5   CHLORIDE mmol/L 105 106   CO2 mmol/L 27 29   BUN mg/dL 20 19   CREATININE mg/dL 1.47* 1.45*   ANION GAP mmol/L 7 8    CALCIUM mg/dL 8.5 9.5   ALBUMIN g/dL  --  4.2   TOTAL BILIRUBIN mg/dL  --  0.35   ALK PHOS U/L  --  86   ALT U/L  --  13   AST U/L  --  16   GLUCOSE RANDOM mg/dL 132 120         Results from last 7 days   Lab Units 06/21/25  1101 06/21/25  0709 06/21/25  0557 06/20/25  1803   POC GLUCOSE mg/dl 95 124 124 118         Results from last 7 days   Lab Units 06/20/25  1144   LACTIC ACID mmol/L 0.5       Recent Cultures (last 7 days):         Imaging Results Review: I personally reviewed the following image studies/reports in PACS and discussed pertinent findings with Radiology: CT abdomen/pelvis. My interpretation of the radiology images/reports is: Lab results reviewed.      Last 24 Hours Medication List:     Current Facility-Administered Medications:     albuterol inhalation solution 2.5 mg, Q4H PRN    [START ON 6/22/2025] aspirin chewable tablet 81 mg, Daily    atorvastatin (LIPITOR) tablet 40 mg, Daily With Dinner    fluticasone-vilanterol 200-25 mcg/actuation 1 puff, Daily **AND** umeclidinium 62.5 mcg/actuation inhaler AEPB 1 puff, Daily    insulin lispro (HumALOG/ADMELOG) 100 units/mL subcutaneous injection 2-12 Units, TID With Meals **AND** Fingerstick Glucose (POCT), Q6H    magnesium sulfate 4 g/100 mL IVPB (premix) 4 g, Once, Last Rate: 4 g (06/21/25 1038)    metoprolol succinate (TOPROL-XL) 24 hr tablet 25 mg, HS    morphine injection 2 mg, Q6H PRN    morphine injection 4 mg, Q6H PRN    naloxone (NARCAN) 0.04 mg/mL syringe 0.04 mg, Q1MIN PRN    ondansetron (ZOFRAN) injection 4 mg, Q8H PRN    pantoprazole (PROTONIX) EC tablet 40 mg, Daily    traZODone (DESYREL) tablet 100 mg, HS    Administrative Statements   Today, Patient Was Seen By: Monik Medina MD  I have spent a total time of 32 minutes in caring for this patient on the day of the visit/encounter including Diagnostic results, Risks and benefits of tx options, Instructions for management, Patient and family education, Importance of tx  compliance, Risk factor reductions, Impressions, Counseling / Coordination of care, Documenting in the medical record, Reviewing/placing orders in the medical record (including tests, medications, and/or procedures), Obtaining or reviewing history  , and Communicating with other healthcare professionals .    **Please Note: This note may have been constructed using a voice recognition system.**

## 2025-06-21 NOTE — ASSESSMENT & PLAN NOTE
Lab Results   Component Value Date    HGBA1C 6.1 (H) 04/09/2025   At home on metformin 750 mg daily  Diabetes mellitus type 2 controlled  Monitor Accu-Cheks  Avoid hypoglycemia      Recent Labs     06/20/25  1803 06/21/25  0557 06/21/25  0709 06/21/25  1101   POCGLU 118 124 124 95       Blood Sugar Average: Last 72 hrs:  (P) 115.25

## 2025-06-22 ENCOUNTER — APPOINTMENT (INPATIENT)
Dept: RADIOLOGY | Facility: HOSPITAL | Age: 65
DRG: 386 | End: 2025-06-22
Payer: COMMERCIAL

## 2025-06-22 LAB
ANION GAP SERPL CALCULATED.3IONS-SCNC: 9 MMOL/L (ref 4–13)
BASOPHILS # BLD AUTO: 0.02 THOUSANDS/ÂΜL (ref 0–0.1)
BASOPHILS NFR BLD AUTO: 0 % (ref 0–1)
BUN SERPL-MCNC: 19 MG/DL (ref 5–25)
CALCIUM SERPL-MCNC: 8.1 MG/DL (ref 8.4–10.2)
CHLORIDE SERPL-SCNC: 106 MMOL/L (ref 96–108)
CO2 SERPL-SCNC: 23 MMOL/L (ref 21–32)
CREAT SERPL-MCNC: 1.47 MG/DL (ref 0.6–1.3)
CRP SERPL QL: 75.9 MG/L
EOSINOPHIL # BLD AUTO: 0.21 THOUSAND/ÂΜL (ref 0–0.61)
EOSINOPHIL NFR BLD AUTO: 2 % (ref 0–6)
ERYTHROCYTE [DISTWIDTH] IN BLOOD BY AUTOMATED COUNT: 15.3 % (ref 11.6–15.1)
GFR SERPL CREATININE-BSD FRML MDRD: 50 ML/MIN/1.73SQ M
GLUCOSE SERPL-MCNC: 105 MG/DL (ref 65–140)
GLUCOSE SERPL-MCNC: 111 MG/DL (ref 65–140)
GLUCOSE SERPL-MCNC: 126 MG/DL (ref 65–140)
GLUCOSE SERPL-MCNC: 130 MG/DL (ref 65–140)
GLUCOSE SERPL-MCNC: 161 MG/DL (ref 65–140)
GLUCOSE SERPL-MCNC: 180 MG/DL (ref 65–140)
HCT VFR BLD AUTO: 33 % (ref 36.5–49.3)
HGB BLD-MCNC: 10.1 G/DL (ref 12–17)
IMM GRANULOCYTES # BLD AUTO: 0.06 THOUSAND/UL (ref 0–0.2)
IMM GRANULOCYTES NFR BLD AUTO: 1 % (ref 0–2)
LYMPHOCYTES # BLD AUTO: 0.87 THOUSANDS/ÂΜL (ref 0.6–4.47)
LYMPHOCYTES NFR BLD AUTO: 8 % (ref 14–44)
MAGNESIUM SERPL-MCNC: 2.1 MG/DL (ref 1.9–2.7)
MCH RBC QN AUTO: 26 PG (ref 26.8–34.3)
MCHC RBC AUTO-ENTMCNC: 30.6 G/DL (ref 31.4–37.4)
MCV RBC AUTO: 85 FL (ref 82–98)
MONOCYTES # BLD AUTO: 1.63 THOUSAND/ÂΜL (ref 0.17–1.22)
MONOCYTES NFR BLD AUTO: 15 % (ref 4–12)
NEUTROPHILS # BLD AUTO: 8.23 THOUSANDS/ÂΜL (ref 1.85–7.62)
NEUTS SEG NFR BLD AUTO: 74 % (ref 43–75)
NRBC BLD AUTO-RTO: 0 /100 WBCS
PLATELET # BLD AUTO: 165 THOUSANDS/UL (ref 149–390)
PMV BLD AUTO: 11.5 FL (ref 8.9–12.7)
POTASSIUM SERPL-SCNC: 4.2 MMOL/L (ref 3.5–5.3)
RBC # BLD AUTO: 3.89 MILLION/UL (ref 3.88–5.62)
SODIUM SERPL-SCNC: 138 MMOL/L (ref 135–147)
WBC # BLD AUTO: 11.02 THOUSAND/UL (ref 4.31–10.16)

## 2025-06-22 PROCEDURE — 82948 REAGENT STRIP/BLOOD GLUCOSE: CPT

## 2025-06-22 PROCEDURE — 83735 ASSAY OF MAGNESIUM: CPT | Performed by: INTERNAL MEDICINE

## 2025-06-22 PROCEDURE — 87177 OVA AND PARASITES SMEARS: CPT | Performed by: STUDENT IN AN ORGANIZED HEALTH CARE EDUCATION/TRAINING PROGRAM

## 2025-06-22 PROCEDURE — 87209 SMEAR COMPLEX STAIN: CPT | Performed by: STUDENT IN AN ORGANIZED HEALTH CARE EDUCATION/TRAINING PROGRAM

## 2025-06-22 PROCEDURE — 80048 BASIC METABOLIC PNL TOTAL CA: CPT | Performed by: INTERNAL MEDICINE

## 2025-06-22 PROCEDURE — 94760 N-INVAS EAR/PLS OXIMETRY 1: CPT

## 2025-06-22 PROCEDURE — 94640 AIRWAY INHALATION TREATMENT: CPT

## 2025-06-22 PROCEDURE — 99222 1ST HOSP IP/OBS MODERATE 55: CPT | Performed by: INTERNAL MEDICINE

## 2025-06-22 PROCEDURE — 85025 COMPLETE CBC W/AUTO DIFF WBC: CPT | Performed by: INTERNAL MEDICINE

## 2025-06-22 PROCEDURE — 99232 SBSQ HOSP IP/OBS MODERATE 35: CPT | Performed by: INTERNAL MEDICINE

## 2025-06-22 PROCEDURE — 94664 DEMO&/EVAL PT USE INHALER: CPT

## 2025-06-22 PROCEDURE — 87505 NFCT AGENT DETECTION GI: CPT | Performed by: STUDENT IN AN ORGANIZED HEALTH CARE EDUCATION/TRAINING PROGRAM

## 2025-06-22 PROCEDURE — 83993 ASSAY FOR CALPROTECTIN FECAL: CPT | Performed by: STUDENT IN AN ORGANIZED HEALTH CARE EDUCATION/TRAINING PROGRAM

## 2025-06-22 PROCEDURE — 74177 CT ABD & PELVIS W/CONTRAST: CPT

## 2025-06-22 PROCEDURE — 86140 C-REACTIVE PROTEIN: CPT | Performed by: STUDENT IN AN ORGANIZED HEALTH CARE EDUCATION/TRAINING PROGRAM

## 2025-06-22 PROCEDURE — 93005 ELECTROCARDIOGRAM TRACING: CPT

## 2025-06-22 RX ORDER — METOPROLOL TARTRATE 1 MG/ML
2.5 INJECTION, SOLUTION INTRAVENOUS ONCE
Status: COMPLETED | OUTPATIENT
Start: 2025-06-22 | End: 2025-06-22

## 2025-06-22 RX ORDER — METOPROLOL TARTRATE 1 MG/ML
2.5 INJECTION, SOLUTION INTRAVENOUS EVERY 6 HOURS PRN
Status: DISCONTINUED | OUTPATIENT
Start: 2025-06-22 | End: 2025-06-25 | Stop reason: HOSPADM

## 2025-06-22 RX ORDER — SODIUM CHLORIDE, SODIUM GLUCONATE, SODIUM ACETATE, POTASSIUM CHLORIDE, MAGNESIUM CHLORIDE, SODIUM PHOSPHATE, DIBASIC, AND POTASSIUM PHOSPHATE .53; .5; .37; .037; .03; .012; .00082 G/100ML; G/100ML; G/100ML; G/100ML; G/100ML; G/100ML; G/100ML
500 INJECTION, SOLUTION INTRAVENOUS ONCE
Status: COMPLETED | OUTPATIENT
Start: 2025-06-23 | End: 2025-06-23

## 2025-06-22 RX ORDER — LEVALBUTEROL INHALATION SOLUTION 1.25 MG/3ML
1.25 SOLUTION RESPIRATORY (INHALATION)
Status: DISCONTINUED | OUTPATIENT
Start: 2025-06-22 | End: 2025-06-25 | Stop reason: HOSPADM

## 2025-06-22 RX ORDER — SULFASALAZINE 500 MG/1
500 TABLET ORAL 4 TIMES DAILY
Status: DISCONTINUED | OUTPATIENT
Start: 2025-06-22 | End: 2025-06-25 | Stop reason: HOSPADM

## 2025-06-22 RX ORDER — IODIXANOL 320 MG/ML
100 INJECTION, SOLUTION INTRAVASCULAR
Status: COMPLETED | OUTPATIENT
Start: 2025-06-22 | End: 2025-06-22

## 2025-06-22 RX ORDER — METOPROLOL TARTRATE 1 MG/ML
2.5 INJECTION, SOLUTION INTRAVENOUS ONCE
Status: COMPLETED | OUTPATIENT
Start: 2025-06-23 | End: 2025-06-23

## 2025-06-22 RX ORDER — METOPROLOL TARTRATE 1 MG/ML
2.5 INJECTION, SOLUTION INTRAVENOUS ONCE
Status: DISCONTINUED | OUTPATIENT
Start: 2025-06-23 | End: 2025-06-22

## 2025-06-22 RX ORDER — ALBUTEROL SULFATE 90 UG/1
2 INHALANT RESPIRATORY (INHALATION) EVERY 4 HOURS PRN
Status: DISCONTINUED | OUTPATIENT
Start: 2025-06-22 | End: 2025-06-25 | Stop reason: HOSPADM

## 2025-06-22 RX ADMIN — MORPHINE SULFATE 4 MG: 4 INJECTION INTRAVENOUS at 11:55

## 2025-06-22 RX ADMIN — IODIXANOL 100 ML: 320 INJECTION, SOLUTION INTRAVASCULAR at 17:24

## 2025-06-22 RX ADMIN — ATORVASTATIN CALCIUM 40 MG: 40 TABLET, FILM COATED ORAL at 17:23

## 2025-06-22 RX ADMIN — METOPROLOL SUCCINATE 25 MG: 25 TABLET, EXTENDED RELEASE ORAL at 21:19

## 2025-06-22 RX ADMIN — UMECLIDINIUM 1 PUFF: 62.5 AEROSOL, POWDER ORAL at 09:07

## 2025-06-22 RX ADMIN — SULFASALAZINE 500 MG: 500 TABLET ORAL at 21:18

## 2025-06-22 RX ADMIN — ALBUTEROL SULFATE 2.5 MG: 2.5 SOLUTION RESPIRATORY (INHALATION) at 05:20

## 2025-06-22 RX ADMIN — ENOXAPARIN SODIUM 40 MG: 40 INJECTION SUBCUTANEOUS at 09:08

## 2025-06-22 RX ADMIN — METOROPROLOL TARTRATE 2.5 MG: 5 INJECTION, SOLUTION INTRAVENOUS at 23:18

## 2025-06-22 RX ADMIN — ASPIRIN 81 MG CHEWABLE TABLET 81 MG: 81 TABLET CHEWABLE at 09:08

## 2025-06-22 RX ADMIN — PANTOPRAZOLE SODIUM 40 MG: 40 TABLET, DELAYED RELEASE ORAL at 09:08

## 2025-06-22 RX ADMIN — SULFASALAZINE 500 MG: 500 TABLET ORAL at 17:23

## 2025-06-22 RX ADMIN — TRAZODONE HYDROCHLORIDE 100 MG: 100 TABLET ORAL at 21:18

## 2025-06-22 RX ADMIN — FLUTICASONE FUROATE AND VILANTEROL TRIFENATATE 1 PUFF: 200; 25 POWDER RESPIRATORY (INHALATION) at 09:07

## 2025-06-22 RX ADMIN — INSULIN LISPRO 2 UNITS: 100 INJECTION, SOLUTION INTRAVENOUS; SUBCUTANEOUS at 09:08

## 2025-06-22 RX ADMIN — MORPHINE SULFATE 4 MG: 4 INJECTION INTRAVENOUS at 05:52

## 2025-06-22 RX ADMIN — MORPHINE SULFATE 4 MG: 4 INJECTION INTRAVENOUS at 18:15

## 2025-06-22 RX ADMIN — LEVALBUTEROL HYDROCHLORIDE 1.25 MG: 1.25 SOLUTION RESPIRATORY (INHALATION) at 19:20

## 2025-06-22 RX ADMIN — METOPROLOL TARTRATE 2.5 MG: 1 INJECTION, SOLUTION INTRAVENOUS at 22:27

## 2025-06-22 RX ADMIN — SULFASALAZINE 500 MG: 500 TABLET ORAL at 11:55

## 2025-06-22 NOTE — UTILIZATION REVIEW
Initial Clinical Review    Admission: Date/Time/Statement:   Admission Orders (From admission, onward)       Ordered        06/20/25 1556  INPATIENT ADMISSION  Once                          Orders Placed This Encounter   Procedures    INPATIENT ADMISSION     Standing Status:   Standing     Number of Occurrences:   1     Level of Care:   Med Surg [16]     Estimated length of stay:   More than 2 Midnights     Certification:   I certify that inpatient services are medically necessary for this patient for a duration of greater than two midnights. See H&P and MD Progress Notes for additional information about the patient's course of treatment.     ED Arrival Information       Expected   6/20/2025     Arrival   6/20/2025 08:40    Acuity   Urgent              Means of arrival   Ambulance    Escorted by   [facility]    Service   Hospitalist    Admission type   Emergency              Arrival complaint   abdominal pain             Chief Complaint   Patient presents with    Abdominal Pain     RLQ for the past 2 days. Patient reports recently starting ozempic.       Initial Presentation: 65 y.o. male with PMHx including Crohn's disease s/p ilela colectomy with anastomosis and ileostomy which has been reversed October 2018, diabetes, CAD , COPD who presented on 6/20/25 to ED due to RLQ abdominal pain for one day  Patient has a history of previous small bowel obstruction and reports that the pain is similar to the previous event.  Patient reports a lot of belching. Reports last BM this morning he is passing flatus. In the ED, CT findings significant for dilated small bowel loops proximal to ileocolonic anastomosis measuring 5cm concerning for partial obstruction . Labs revealed WBC 14.42, hgb 11.0, Cr 1.45, CRP 3.3. Given 500 ml IVF bolus, IV zofran x1, IV dilaudid x1 in ED.     Plan:  Admit Inpatient status Dx Small Bowel Obstruction:   med surg, keep NPO General Sx consult, serial abd exams, analgesia and antiemetics  prn. Monitor blood sugars and start SSI. IO, SCDs.     Anticipated Length of Stay/Certification Statement: Patient will be admitted on an observation basis with an anticipated length of stay of less than 2 midnights secondary to small bowel obstruction.     6/20 Per General Sx: No acute surgical intervention. Patient is having bowel movements and passing flatus. Patient without nausea or vomiting. Patient was tolerating PO intake prior to coming to the hospital. CRP lab. PO trial.      Date: 6/21   Day 2:  Reports abdominal pain slightly better than yesterday. Nausea slightly better. Encourage out of bed and ambulation with assistance. On exam, Abdomen soft, mild diffuse tenderness. Continue liquid diet, Sx following, continue analgesia and antiemetics prn.     Update 6/22: Assessment unchanged, abd soft, mild diffuse tenderness noted. Continues with abdominal pain and nausea, discussion with general sx. Continue to encourage OOB. Continue clear liquid diet, pain and nausea control, monitor abd exam, accuchecks, continue home meds. GI following.     ED Treatment-Medication Administration from 06/20/2025 0840 to 06/20/2025 1849         Date/Time Order Dose Route Action     06/20/2025 0941 ondansetron (ZOFRAN) injection 4 mg 4 mg Intravenous Given     06/20/2025 0942 HYDROmorphone (DILAUDID) injection 0.5 mg 0.5 mg Intravenous Given     06/20/2025 0942 sodium chloride 0.9 % bolus 500 mL 500 mL Intravenous New Bag     06/20/2025 0942 oxymetazoline (AFRIN) 0.05 % nasal spray 2 spray 2 spray Each Nare Given     06/20/2025 1049 iohexol (OMNIPAQUE) 350 MG/ML injection (MULTI-DOSE) 100 mL 100 mL Intravenous Given     06/20/2025 1145 HYDROmorphone (DILAUDID) injection 1 mg 1 mg Intravenous Given            Scheduled Medications:  aspirin, 81 mg, Oral, Daily  atorvastatin, 40 mg, Oral, Daily With Dinner  enoxaparin, 40 mg, Subcutaneous, Q24H JUVENITNO  fluticasone-vilanterol, 1 puff, Inhalation, Daily   And  umeclidinium, 1 puff,  Inhalation, Daily  insulin lispro, 2-12 Units, Subcutaneous, TID With Meals  metoprolol succinate, 25 mg, Oral, HS  pantoprazole, 40 mg, Oral, Daily  sulfaSALAzine, 500 mg, Oral, 4x Daily  traZODone, 100 mg, Oral, HS      Continuous IV Infusions: none     PRN Meds:  albuterol, 2 puff, Inhalation, Q4H PRN x3  morphine injection, 2 mg, Subcutaneous, Q6H PRN x3  morphine injection, 4 mg, Intravenous, Q6H PRN x5  naloxone, 0.04 mg, Intravenous, Q1MIN PRN  ondansetron, 4 mg, Intravenous, Q8H PRN      ED Triage Vitals [06/20/25 0844]   Temperature Pulse Respirations Blood Pressure SpO2 Pain Score   97.7 °F (36.5 °C) 93 20 133/65 98 % 8     Weight (last 2 days)       Date/Time Weight    06/21/25 0503 89.9 (198.2)            Vital Signs (last 3 days)       Date/Time Temp Pulse Resp BP MAP (mmHg) SpO2 Calculated FIO2 (%) - Nasal Cannula Nasal Cannula O2 Flow Rate (L/min) O2 Device Patient Position - Orthostatic VS Pain    06/22/25 1208 -- -- -- -- -- -- -- -- -- -- 8    06/22/25 1155 -- -- -- -- -- -- -- -- -- -- 8    06/22/25 07:23:57 99.6 °F (37.6 °C) 112 -- 98/59 72 86 % -- -- -- -- --    06/22/25 0552 -- -- -- -- -- -- -- -- -- -- 10 - Worst Possible Pain    06/21/25 21:11:48 99.2 °F (37.3 °C) -- -- 109/63 78 -- -- -- -- -- --    06/21/25 21:10:54 99.2 °F (37.3 °C) 96 -- 109/63 78 92 % -- -- -- -- --    06/21/25 2030 -- -- -- -- -- -- 36 4 L/min Nasal cannula -- 10 - Worst Possible Pain    06/21/25 1616 -- -- -- -- -- -- -- -- -- -- 7 06/21/25 15:57:52 98.5 °F (36.9 °C) 79 -- 121/63 82 93 % -- -- -- -- --    06/21/25 1414 -- -- -- -- -- -- -- -- -- -- 5 06/21/25 0851 -- -- -- -- -- -- -- -- -- -- 8 06/21/25 07:24:31 98.7 °F (37.1 °C) 77 -- 99/53 68 96 % -- -- -- -- --    06/21/25 0503 -- -- 20 -- -- -- -- -- -- -- 7 06/20/25 2251 -- -- -- -- -- -- -- -- -- -- 8 06/20/25 2132 97.5 °F (36.4 °C) 91 -- 115/67 83 97 % -- -- -- -- --    06/20/25 21:31:34 97.5 °F (36.4 °C) 90 -- 115/67 83 96 % -- -- -- -- --     06/20/25 18:58:44 98.2 °F (36.8 °C) -- 20 119/70 86 -- -- -- -- -- --    06/20/25 18:57:21 98.2 °F (36.8 °C) -- -- 119/70 86 -- -- -- -- -- --    06/20/25 1855 -- -- -- -- -- -- -- -- -- -- 10 - Worst Possible Pain    06/20/25 1500 -- 90 18 140/70 97 99 % -- -- None (Room air) Sitting --    06/20/25 1400 -- 90 18 139/73 99 98 % -- -- None (Room air) Sitting --    06/20/25 1200 -- 84 -- 125/78 97 99 % -- -- -- -- --    06/20/25 1148 -- 86 18 107/64 79 99 % 36 4 L/min Nasal cannula Sitting 7    06/20/25 1145 -- -- -- -- -- -- -- -- -- -- 7    06/20/25 0852 -- -- -- -- -- -- -- -- Nasal cannula -- --    06/20/25 0844 97.7 °F (36.5 °C) 93 20 133/65 -- 98 % 36 4 L/min Nasal cannula Sitting 8              Pertinent Labs/Diagnostic Test Results:   Radiology:  CT abdomen pelvis with contrast   Final Interpretation by Castillo Mas MD (06/20 1126)      Dilated small bowel loops proximal to the ileocolonic anastomosis measuring 5 cm. Partial obstruction in the differential. Possibly secondary to adhesions.      Surgical consult recommended.         Workstation performed: [id number]         XR abdomen obstruction series    (Results Pending)     Cardiology:  No orders to display     GI:  No orders to display           Results from last 7 days   Lab Units 06/22/25  0518 06/21/25  0509 06/20/25  0946   WBC Thousand/uL 11.02* 11.66* 14.42*   HEMOGLOBIN g/dL 10.1* 10.0* 11.0*   HEMATOCRIT % 33.0* 32.9* 35.7*   PLATELETS Thousands/uL 165 176 189   TOTAL NEUT ABS Thousands/µL 8.23*  --  11.80*         Results from last 7 days   Lab Units 06/22/25  0518 06/21/25  0509 06/20/25  0946   SODIUM mmol/L 138 139 143   POTASSIUM mmol/L 4.2 4.0 4.5   CHLORIDE mmol/L 106 105 106   CO2 mmol/L 23 27 29   ANION GAP mmol/L 9 7 8   BUN mg/dL 19 20 19   CREATININE mg/dL 1.47* 1.47* 1.45*   EGFR ml/min/1.73sq m 50 50 50   CALCIUM mg/dL 8.1* 8.5 9.5   MAGNESIUM mg/dL 2.1 1.6*  --      Results from last 7 days   Lab Units 06/20/25  0946   AST  U/L 16   ALT U/L 13   ALK PHOS U/L 86   TOTAL PROTEIN g/dL 7.5   ALBUMIN g/dL 4.2   TOTAL BILIRUBIN mg/dL 0.35     Results from last 7 days   Lab Units 06/22/25  1105 06/22/25  0628 06/22/25  0014 06/21/25  2108 06/21/25  1623 06/21/25  1101 06/21/25  0709 06/21/25  0557 06/20/25  1803   POC GLUCOSE mg/dl 126 180* 111 109 111 95 124 124 118     Results from last 7 days   Lab Units 06/22/25  0518 06/21/25  0509 06/20/25  0946   GLUCOSE RANDOM mg/dL 130 132 120     Results from last 7 days   Lab Units 06/20/25  1144   LACTIC ACID mmol/L 0.5     Results from last 7 days   Lab Units 06/20/25  0946   LIPASE u/L 29     Results from last 7 days   Lab Units 06/22/25  0518 06/20/25  1448   CRP mg/L 75.9* 3.3*     Past Medical History[1]  Present on Admission:   SBO (small bowel obstruction) ([facility])   Coronary artery disease involving native coronary artery of native heart without angina pectoris   Stage 4 very severe COPD by GOLD classification ([facility])   Paroxysmal atrial fibrillation (HCC)   Type 2 diabetes mellitus, without long-term current use of insulin (HCC)   Crohn disease ([facility])      Admitting Diagnosis: SBO (small bowel obstruction) ([facility]) [K56.609]  Age/Sex: 65 y.o. male    Network Utilization Review Department  ATTENTION: Please call with any questions or concerns to [phone] and carefully listen to the prompts so that you are directed to the right person. All voicemails are confidential.   For Discharge needs, contact Care Management DC Support Team at [phone] opt. [phone]  Send all requests for admission clinical reviews, approved or denied determinations and any other requests to dedicated fax number below belonging to the campus where the patient is receiving treatment. List of dedicated fax numbers for the Facilities:  FACILITY NAME UR FAX NUMBER   ADMISSION DENIALS (Administrative/Medical Necessity) [phone]   DISCHARGE SUPPORT TEAM (NETWORK) [phone]   PARENT CHILD HEALTH (Maternity/NICU/Pediatrics)  [phone]   [facility] [phone]   [facility] [phone]   [facility] [phone]   [facility] [phone]   [facility] [phone]   [facility] [phone]   [facility] [phone]   [facility] [phone]   [facility] [phone]   [facility] [phone]   [facility] [phone]   [facility] [phone]              [1]   Past Medical History:  Diagnosis Date    DEBBY (acute kidney injury) ([facility]) 07/08/2018    Alcohol abuse 05/15/2018    Anxiety     Asthma     Bowel obstruction ([facility]) 11/01/2024    Cardiac disease     Cervical stenosis (uterine cervix)     Chest pain     Chronic kidney disease     Colitis     Colon polyps     COPD (chronic obstructive pulmonary disease) ([facility])     2L @ HS and PRN    Coronary artery disease     Diverticulitis     Esophageal reflux     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[facility])     Heart failure, systolic, due to idiopathic cardiomyopathy ([facility]) 05/21/2018    Hyperlipidemia     Hypertension     IBD (inflammatory bowel disease)     Myocardial infarction ([facility])     Old myocardial infarction     Paroxysmal atrial fibrillation ([facility]) 08/24/2018    Perforation of colon ([facility])     Pericarditis 12/07/2016    Type 2 diabetes, diet controlled ([facility])     Ulcerative colitis ([facility])     VT (ventricular tachycardia) ([facility]) 12/08/2023

## 2025-06-22 NOTE — ASSESSMENT & PLAN NOTE
Lab Results   Component Value Date    HGBA1C 6.1 (H) 04/09/2025   At home on metformin 750 mg daily  Diabetes mellitus type 2 controlled  Accu-Cheks reviewed  Monitor Accu-Cheks  Avoid hypoglycemia    Recent Labs     06/21/25 2108 06/22/25  0014 06/22/25  0628 06/22/25  1105   POCGLU 109 111 180* 126       Blood Sugar Average: Last 72 hrs:  (P) 122

## 2025-06-22 NOTE — PROGRESS NOTES
Progress Note - Hospitalist   Name: Mathew Bird 65 y.o. male I MRN: [id number]  Unit/Bed#: -01 I Date of Admission: 6/20/2025   Date of Service: 6/22/2025 I Hospital Day: 2     Assessment & Plan  SBO (small bowel obstruction) ([facility])  Patient presents with abdominal pain nausea  CT findings significant for dilated small bowel loops proximal to ileocolonic anastomosis measuring 5cm concerning for partial obstruction  Patient with ongoing episodes of nausea and abdominal pain  Discussed with surgery no plans for operative intervention and request GI evaluation  Discussed with GI via secure chat  Presently on clear liquid diet  Analgesics, antiemetics, supportive cares  Monitor  Coronary artery disease involving native coronary artery of native heart without angina pectoris  Continue aspirin metoprolol statin  Stage 4 very severe COPD by GOLD classification ([facility])  Continue Trelegy, DuoNebs  Paroxysmal atrial fibrillation ([facility])  Not on anticoagulation  Continue metoprolol  Type 2 diabetes mellitus, without long-term current use of insulin ([facility])  Lab Results   Component Value Date    HGBA1C 6.1 (H) 04/09/2025   At home on metformin 750 mg daily  Diabetes mellitus type 2 controlled  Accu-Cheks reviewed  Monitor Accu-Cheks  Avoid hypoglycemia    Recent Labs     06/21/25  2108 06/22/25  0014 06/22/25  0628 06/22/25  1105   POCGLU 109 111 180* 126       Blood Sugar Average: Last 72 hrs:  (P) 122    Crohn disease ([facility])  s/p ilela colectomy with anastomosis and ileostomy which has been reversed October 2018   Continue sulfasalazine  GI following                  VTE Pharmacologic Prophylaxis: VTE Score: 3 Moderate Risk (Score 3-4) - Pharmacological DVT Prophylaxis Ordered: enoxaparin (Lovenox).    Mobility:   Basic Mobility Inpatient Raw Score: 24  JH-HLM Goal: 8: Walk 250 feet or more  JH-HLM Achieved: 6: Walk 10 steps or more  JH-HLM Goal NOT achieved. Continue with multidisciplinary rounding and encourage  appropriate mobility to improve upon [facility] goals.    Patient Centered Rounds: I performed bedside rounds with nursing staff today.   Discussions with Specialists or Other Care Team Provider: Surgery, GI    Education and Discussions with Family / Patient: Discussed with the patient, call left a message for daughter Amparo.     Current Length of Stay: 2 day(s)  Current Patient Status: Inpatient   Certification Statement: The patient will continue to require additional inpatient hospital stay due to abdominal pain nausea history of Crohn's disease GI, surgery following  Discharge Plan: Abdominal pain nausea GI consulted    Code Status: Level 1 - Full Code    Subjective     Comfortably in bed  Continues with abdominal pain and nausea  Discussed with surgery   Encourage out of bed into chair  Medications reviewed with the patient  He is requesting albuterol HFA as needed  Discussed with RN    Objective :  Temp:  [98.5 °F (36.9 °C)-99.6 °F (37.6 °C)] 99.6 °F (37.6 °C)  HR:  [] 112  BP: ()/(59-63) 98/59  SpO2:  [86 %-93 %] 86 %  O2 Device: Nasal cannula  Nasal Cannula O2 Flow Rate (L/min):  [4 L/min] 4 L/min    Body mass index is 31.99 kg/m².     Input and Output Summary (last 24 hours):     Intake/Output Summary (Last 24 hours) at 6/22/2025 1213  Last data filed at 6/21/2025 1700  Gross per 24 hour   Intake 924 ml   Output --   Net 924 ml       Physical Exam    Comfortably in bed  Obese  Lungs diminished breath sounds  Heart sounds S1-S2 noted  Abdomen soft  Mild diffuse tenderness reported  Awake follows commands  No pedal edema  No rash    Lines/Drains:              Lab Results: I have reviewed the following results:   Results from last 7 days   Lab Units 06/22/25  0518   WBC Thousand/uL 11.02*   HEMOGLOBIN g/dL 10.1*   HEMATOCRIT % 33.0*   PLATELETS Thousands/uL 165   SEGS PCT % 74   LYMPHO PCT % 8*   MONO PCT % 15*   EOS PCT % 2     Results from last 7 days   Lab Units 06/22/25  0518 06/21/25  0509  06/20/25  0946   SODIUM mmol/L 138   < > 143   POTASSIUM mmol/L 4.2   < > 4.5   CHLORIDE mmol/L 106   < > 106   CO2 mmol/L 23   < > 29   BUN mg/dL 19   < > 19   CREATININE mg/dL 1.47*   < > 1.45*   ANION GAP mmol/L 9   < > 8   CALCIUM mg/dL 8.1*   < > 9.5   ALBUMIN g/dL  --   --  4.2   TOTAL BILIRUBIN mg/dL  --   --  0.35   ALK PHOS U/L  --   --  86   ALT U/L  --   --  13   AST U/L  --   --  16   GLUCOSE RANDOM mg/dL 130   < > 120    < > = values in this interval not displayed.         Results from last 7 days   Lab Units 06/22/25  1105 06/22/25  0628 06/22/25  0014 06/21/25  2108 06/21/25  1623 06/21/25  1101 06/21/25  0709 06/21/25  0557 06/20/25  1803   POC GLUCOSE mg/dl 126 180* 111 109 111 95 124 124 118         Results from last 7 days   Lab Units 06/20/25  1144   LACTIC ACID mmol/L 0.5       Recent Cultures (last 7 days):         Imaging Results Review: I personally reviewed the following image studies/reports in PACS and discussed pertinent findings with Radiology: CT abdomen/pelvis. My interpretation of the radiology images/reports is: Lab results reviewed.      Last 24 Hours Medication List:     Current Facility-Administered Medications:     albuterol (PROVENTIL HFA,VENTOLIN HFA) inhaler 2 puff, Q4H PRN    aspirin chewable tablet 81 mg, Daily    atorvastatin (LIPITOR) tablet 40 mg, Daily With Dinner    enoxaparin (LOVENOX) subcutaneous injection 40 mg, Q24H JUVENTINO    fluticasone-vilanterol 200-25 mcg/actuation 1 puff, Daily **AND** umeclidinium 62.5 mcg/actuation inhaler AEPB 1 puff, Daily    insulin lispro (HumALOG/ADMELOG) 100 units/mL subcutaneous injection 2-12 Units, TID With Meals **AND** Fingerstick Glucose (POCT), Q6H    metoprolol succinate (TOPROL-XL) 24 hr tablet 25 mg, HS    morphine injection 2 mg, Q6H PRN    morphine injection 4 mg, Q6H PRN    naloxone (NARCAN) 0.04 mg/mL syringe 0.04 mg, Q1MIN PRN    ondansetron (ZOFRAN) injection 4 mg, Q8H PRN    pantoprazole (PROTONIX) EC tablet 40 mg,  Daily    sulfaSALAzine (AZULFIDINE) tablet 500 mg, 4x Daily    traZODone (DESYREL) tablet 100 mg, HS    Administrative Statements   Today, Patient Was Seen By: Monik Medina MD  I have spent a total time of 32 minutes in caring for this patient on the day of the visit/encounter including Diagnostic results, Risks and benefits of tx options, Instructions for management, Patient and family education, Importance of tx compliance, Risk factor reductions, Impressions, Counseling / Coordination of care, Documenting in the medical record, Reviewing/placing orders in the medical record (including tests, medications, and/or procedures), Obtaining or reviewing history  , and Communicating with other healthcare professionals .    **Please Note: This note may have been constructed using a voice recognition system.**

## 2025-06-22 NOTE — ASSESSMENT & PLAN NOTE
Patient presents with abdominal pain nausea  CT findings significant for dilated small bowel loops proximal to ileocolonic anastomosis measuring 5cm concerning for partial obstruction  Patient with ongoing episodes of nausea and abdominal pain  Discussed with surgery no plans for operative intervention and request GI evaluation  Discussed with GI via secure chat  Presently on clear liquid diet  Analgesics, antiemetics, supportive cares  Monitor

## 2025-06-22 NOTE — CONSULTS
Consultation - Gastroenterology   Name: Mathew Bird 65 y.o. male I MRN: [id number]  Unit/Bed#: -01 I Date of Admission: 6/20/2025   Date of Service: 6/22/2025 I Hospital Day: 2       Inpatient consult to gastroenterology     Date/Time  6/22/2025 1:21 PM     Performed by  Luz Maria Carl MD   Authorized by  Monik Medina MD           Physician Requesting Evaluation: Monik Medina, *   Reason for Evaluation / Principal Problem: Normal bowel obstruction    Assessment & Plan  SBO (small bowel obstruction) (HCC)  Crohn disease (HCC)  Patient is a 65-year-old male presenting with abdominal discomfort found to be a small bowel obstruction. Past medical history is notable for Crohn's disease with a ileocolectomy with anastomosis and ileostomy in 2018 currently on sulfasalazine (500 mg 4 times a day), history of methamphetamine use, history of tobacco use up until October 2024.  Patient presenting with severe abdominal discomfort, CT imaging concerning for partial obstruction most likely secondary to adhesions of ileocolonic anastomosis.  Patient was evaluated by surgery who determined he did not need any urgent surgical evaluation.    It appears patient has been only on sulfasalazine despite multiple abdominal surgeries for his Crohn's disease.  Patient unsure why he was not given a biologic medication in the past.  It appears that patient's Crohn's symptoms are not well-controlled with the sulfasalazine still endorsing multiple watery bowel movements daily without blood.  Patient is due for a outpatient colonoscopy for further evaluation however this is now his second admission within the month for concerns of small bowel obstruction.  Last colonoscopy was in 2016 however he has not had one recently.  Patient would benefit from inpatient stool studies.    - Obtain stool studies like stool ova, enteric panel, and C. Difficile  -Check CRP and fecal calprotectin  -Inpatient CTE to evaluate small  bowel and look for any new stricturing which could explain ongoing partial obstructions  -Patient will need a close follow-up outpatient with the IBD clinic for further management of medications to treat his Crohn's disease  -Continue PTA sulfasalazine for now      History of Present Illness   HPI:  Mathew Bird is a 65 y.o. male who presents  with abdominal discomfort found to be a small bowel obstruction. Past medical history is notable for Crohn's disease with a ileocolectomy with anastomosis and ileostomy in 2018 currently on sulfasalazine (500 mg 4 times a day), history of methamphetamine use, history of tobacco use up until October 2024.    Patient states he was doing okay however started having severe excruciating abdominal pain which prompted him to come to the hospital for evaluation he had a similar instance a few months ago at that time he had a small bowel obstruction which resolved.  Patient states that this pain was similar to the last episode he also endorsed ongoing watery bowel movements which are his baseline due to his history of Crohn's disease.  Patient notes having significant colon surgical history for which he used to follow with Dr. Juárez.  Patient states he takes sulfasalazine 500 mg 4 times a day at home however he continues to have watery bowel movements.  He denies being on any biologic medications prior.  He denies any blood in his stool at this time.  Patient states he used to drink alcohol, smoke tobacco, and use methamphetamines however he stopped abruptly using in October 2024 and has been sober since then.    His last colonoscopy was in 2016 he is due for a repeat colonoscopy which has already been ordered for him when he was seen by GI in March 2025.  Patient states he has not scheduled it yet.    Review of Systems  Per HPI    Objective :  Temp:  [98.5 °F (36.9 °C)-99.6 °F (37.6 °C)] 99.6 °F (37.6 °C)  HR:  [] 112  BP: ()/(59-63) 98/59  SpO2:  [86 %-93 %] 86  %  O2 Device: Nasal cannula  Nasal Cannula O2 Flow Rate (L/min):  [4 L/min] 4 L/min    Physical Exam  Pulmonary:      Effort: Pulmonary effort is normal.   Abdominal:      General: Abdomen is protuberant. There is distension.      Hernia: A hernia is present.      Comments: Abdominal exam with multiple surgical scars as well as an abdominal hernia which is significantly notable.  Pain with palpation     Skin:     General: Skin is warm and dry.     Neurological:      Mental Status: He is alert.     Psychiatric:         Mood and Affect: Mood normal.           Lab Results: I have reviewed the following results:

## 2025-06-22 NOTE — ASSESSMENT & PLAN NOTE
[facility] Hospitalist History and Physical      Chief Complaint:      Fatigue and abdominal pain      History Of Present Illness      Yovani Roth 63 year old male with peptic ulcer disease, depression, alcoholic neuropathy, COPD, seizures, macrocytic anemia, CKD 3 ,alcoholic cirrhosis with ascites, history of esophageal banding hereditary hemochromatosis with a paracentesis on 4/12/2024 with 9 L of fluid removed came in  for feeling weak and dizzy for the past 2 days.  His mother says he has been very shaky and weak.  He complains of a toothache and has an appointment to see a dentist.  He denies fever, chills, chest pain, shortness of breath, nausea, vomiting and diarrhea.  He does complain of some abdominal pain.  He smokes a pack of cigarettes every 3 days denies drugs and his last drink was 6 months ago.  He lives with his mother.    CT showed cirrhosis splenomegaly hepatic cyst distended gallbladder which had increased ascites.  Possible mild colitis.  Ultrasound liver and gallbladder showed distended gallbladder no biliary duct dilatation. he was given IV Zosyn and one liter fluids for creat 2.57 . He was given lacutlose for elevated ammonia.            Past Medical History  Past Medical History:   Diagnosis Date    Alcoholic polyneuropathy (CMD)     Anemia, chronic disease     Ataxia     Chronic kidney disease     Cirrhosis (CMD)     COPD (chronic obstructive pulmonary disease) (CMD)     Esophageal varices (CMD)     Hereditary hemochromatosis (CMD)     Hyperbilirubinemia     Hyperlipidemia     Jaundice     Liver disease     Peripheral neuropathy     Prostate cancer (CMD)     PUD (peptic ulcer disease)        Surgical History  Past Surgical History:   Procedure Laterality Date    Esophageal varice ligation      Banding    Foreign body removal Left     L arm    Paracentesis      Prostatectomy      Rotator cuff repair Right         Social History  Social History     Tobacco Use    Smoking status: Every Day  s/p ilela colectomy with anastomosis and ileostomy which has been reversed October 2018   Continue sulfasalazine  GI following       Current packs/day: 0.50     Types: Cigarettes    Smokeless tobacco: Never   Vaping Use    Vaping status: never used   Substance Use Topics    Alcohol use: Not Currently     Comment: last drink nov 2023    Drug use: Never       Family History  For alcoholism    Review of Systems  See hpi    Allergies  ALLERGIES:  Oxybutynin chloride    Medications  (Not in a hospital admission)      Inpatient Medications  Current Facility-Administered Medications   Medication Dose Route Frequency Provider Last Rate Last Admin    heparin (porcine) injection 5,000 Units  5,000 Units Subcutaneous 2 times per day Camila Childs MD        acetaminophen (TYLENOL) tablet 650 mg  650 mg Oral Q4H PRN Camila Childs MD        Or    acetaminophen (TYLENOL) suppository 650 mg  650 mg Rectal Q4H PRN Camila Childs MD        ondansetron (ZOFRAN ODT) disintegrating tablet 4 mg  4 mg Oral Q12H PRN Camila Childs MD        Or    ondansetron (ZOFRAN) injection 4 mg  4 mg Intravenous Q12H PRN Camila Childs MD        docusate sodium-sennosides (SENOKOT S) 50-8.6 MG 2 tablet  2 tablet Oral BID PRN Camila Childs MD        bisacodyl (DULCOLAX) suppository 10 mg  10 mg Rectal Daily PRN Camila Childs MD        melatonin tablet 3 mg  3 mg Oral Nightly PRN Camila Childs MD        sodium chloride 0.9 % flush bag 25 mL  25 mL Intravenous PRN Camila Childs MD        sodium chloride 0.9 % injection 2 mL  2 mL Intracatheter 2 times per day Camila Childs MD        [START ON 4/17/2024] piperacillin-tazobactam (ZOSYN) 3.375 g in dextrose 5% 50 mL IVPB premix  3.375 g Intravenous 3 times per day Camila Childs MD         Current Outpatient Medications   Medication Sig Dispense Refill    gabapentin (NEURONTIN) 100 MG capsule Take 1 capsule by mouth in the morning and 1 capsule at noon and 1 capsule in the evening. 90 capsule 1    lactulose (CHRONULAC) 10 GM/15ML solution Take 45 mLs by mouth in the morning and 45 mLs at noon  and 45 mLs in the evening. 4050 mL 0    oxyCODONE, IMM REL, (ROXICODONE) 5 MG immediate release tablet Take 1 tablet by mouth every 6 hours as needed for Pain. 28 tablet 0    pantoprazole (PROTONIX) 40 MG tablet Take 1 tablet by mouth daily. 30 tablet 1    propRANolol (INDERAL) 10 MG tablet Take 1 tablet by mouth in the morning and 1 tablet in the evening. 60 tablet 1    ursodiol (ACTIGALL) 300 MG capsule Take 1 capsule by mouth in the morning and 1 capsule in the evening. 60 capsule 1    spironolactone (ALDACTONE) 100 MG tablet Take 1 tablet by mouth daily. 30 tablet 0    levETIRAcetam (KepPRA) 500 MG tablet Take 1 tablet by mouth every 12 hours. 60 tablet 1    albuterol-ipratropium (Combivent Respimat) 100-20 MCG/ACT inhaler Inhale 1 puff into the lungs in the morning and 1 puff in the evening. Indications: Chronic Obstructive Lung Disease.      FLUoxetine (PROzac) 20 MG capsule Take 20 mg by mouth daily.         In/Out    Intake/Output Summary (Last 24 hours) at 4/16/2024 2135  Last data filed at 4/16/2024 1923  Gross per 24 hour   Intake 1050 ml   Output --   Net 1050 ml        Physical Exam  Visit Vitals  BP 96/63   Pulse 62   Temp 98.4 °F (36.9 °C) (Oral)   Resp 13   SpO2 99%       Constitutional: ill appearing  Eyes: no icterus  HEENT: pinna normal, nontraumatic  Neck: no adenopathy, no neck stiffness  Respiratory: lungs clear no wheezes  Cardiovascular: s1 s2 no murmurs  Gastrointestinal: soft non tender positive bowel sounds, distended  Musculoskeletal: no gouty tophi   Integument: skin warm and dry  Lymphatic: no adenopathy  Neurologic: moves all extremities  Psychiatric: mood appropriate   Sepsis  focused exam perfusion normal     Labs     Recent Results (from the past 24 hour(s))   Comprehensive Metabolic Panel    Collection Time: 04/16/24  2:40 PM   Result Value Ref Range    Fasting Status      Sodium 142 135 - 145 mmol/L    Potassium 4.5 3.4 - 5.1 mmol/L    Chloride 110 97 - 110 mmol/L    Carbon  Dioxide 19 (L) 21 - 32 mmol/L    Anion Gap 18 7 - 19 mmol/L    Glucose 157 (H) 70 - 99 mg/dL    BUN 39 (H) 6 - 20 mg/dL    Creatinine 2.57 (H) 0.67 - 1.17 mg/dL    Glomerular Filtration Rate 28 (L) >=60    BUN/Cr 15 7 - 25    Calcium 9.0 8.4 - 10.2 mg/dL    Bilirubin, Total 2.6 (H) 0.2 - 1.0 mg/dL    GOT/AST 41 (H) <=37 Units/L    GPT/ALT 21 <64 Units/L    Alkaline Phosphatase 176 (H) 45 - 117 Units/L    Albumin 2.7 (L) 3.6 - 5.1 g/dL    Protein, Total 6.9 6.4 - 8.2 g/dL    Globulin 4.2 (H) 2.0 - 4.0 g/dL    A/G Ratio 0.6 (L) 1.0 - 2.4   Lipase    Collection Time: 04/16/24  2:40 PM   Result Value Ref Range    Lipase 32 15 - 77 Units/L   Magnesium    Collection Time: 04/16/24  2:40 PM   Result Value Ref Range    Magnesium 2.1 1.7 - 2.4 mg/dL   CBC with Automated Differential (performable only)    Collection Time: 04/16/24  2:40 PM   Result Value Ref Range    WBC 12.0 (H) 4.2 - 11.0 K/mcL    RBC 3.10 (L) 4.50 - 5.90 mil/mcL    HGB 10.5 (L) 13.0 - 17.0 g/dL    HCT 32.2 (L) 39.0 - 51.0 %    .9 (H) 78.0 - 100.0 fl    MCH 33.9 26.0 - 34.0 pg    MCHC 32.6 32.0 - 36.5 g/dL    RDW-CV 15.7 (H) 11.0 - 15.0 %    RDW-SD 59.4 (H) 39.0 - 50.0 fL     140 - 450 K/mcL    NRBC 0 <=0 /100 WBC    Neutrophil, Percent 74 %    Lymphocytes, Percent 12 %    Mono, Percent 9 %    Eosinophils, Percent 3 %    Basophils, Percent 1 %    Immature Granulocytes 1 %    Absolute Neutrophils 9.0 (H) 1.8 - 7.7 K/mcL    Absolute Lymphocytes 1.4 1.0 - 4.0 K/mcL    Absolute Monocytes 1.1 (H) 0.3 - 0.9 K/mcL    Absolute Eosinophils  0.3 0.0 - 0.5 K/mcL    Absolute Basophils 0.1 0.0 - 0.3 K/mcL    Absolute Immature Granulocytes 0.1 0.0 - 0.2 K/mcL   Ammonia    Collection Time: 04/16/24  3:25 PM   Result Value Ref Range    Ammonia 116 (H) <=33 mcmol/L   Urinalysis & Reflex Microscopy With Culture If Indicated    Collection Time: 04/16/24  6:38 PM   Result Value Ref Range    COLOR, URINALYSIS Yellow     APPEARANCE, URINALYSIS Clear     GLUCOSE,  URINALYSIS Negative Negative mg/dL    BILIRUBIN, URINALYSIS Negative Negative    KETONES, URINALYSIS Negative Negative mg/dL    SPECIFIC GRAVITY, URINALYSIS 1.022 1.005 - 1.030    OCCULT BLOOD, URINALYSIS Negative Negative    PH, URINALYSIS 6.0 5.0 - 7.0    PROTEIN, URINALYSIS Trace (A) Negative mg/dL    UROBILINOGEN, URINALYSIS 0.2 0.2, 1.0 mg/dL    NITRITE, URINALYSIS Negative Negative    LEUKOCYTE ESTERASE, URINALYSIS Negative Negative   Drug Abuse Screen, Urine    Collection Time: 04/16/24  6:38 PM   Result Value Ref Range    Amphetamines, Urine Negative Negative    Barbiturates, Urine Negative Negative    Benzodiazepines, Urine Negative Negative    Cocaine/ Metabolite, Urine Negative Negative    Opiates, Urine Positive (A) Negative    Phencyclidine, Urine Negative Negative    Cannabinoids, Urine Negative Negative    Fentanyl, Urine Screen Negative Negative   Blood Culture    Collection Time: 04/16/24  6:52 PM    Specimen: Blood   Result Value Ref Range    Culture, Blood or Bone Marrow No Growth <24 hours    Blood Culture    Collection Time: 04/16/24  6:52 PM    Specimen: Blood   Result Value Ref Range    Culture, Blood or Bone Marrow No Growth <24 hours         Imaging:  Radiology:   US LIVER GALLBLADDER PANCREAS (RUQ)   Final Result      1. Distended gallbladder. No definite gallstones. The distension may be due   to prolonged NPO status. If the pain persists, correlate with HIDA scan.       2.  No biliary ductal dilatation.      Electronically Signed by: SHAMA BOURNE MD    Signed on: 4/16/2024 8:52 PM    Workstation ID: [id number]      CT ABDOMEN PELVIS WO CONTRAST   Final Result   Cirrhosis. Splenomegaly. Hepatic cysts.       Distended gallbladder which has increased. Correlate with ultrasound.       Ascites. Vascular collaterals.      No hydronephrosis. Possibility of mild colitis is raised.               Electronically Signed by: SHAMA BOURNE MD    Signed on: 4/16/2024 6:28 PM    Workstation ID:  [id number]      CT HEAD WO CONTRAST   Final Result      No evidence of acute intracranial hemorrhage, hydrocephalus, or midline   shift.  Mild generalized atrophy. Mild periventricular and subcortical   white matter chronic small vessel ischemic changes.         Electronically Signed by: SHAMA BOURNE MD    Signed on: 4/16/2024 6:18 PM    Workstation ID: [id number]      IR PARACENTESIS    (Results Pending)     Admission EKG:   Encounter Date: 03/17/24   Electrocardiogram 12-Lead   Result Value    Ventricular Rate EKG/Min (BPM) 74    Atrial Rate (BPM) 74    OK-Interval (MSEC) 150    QRS-Interval (MSEC) 102    QT-Interval (MSEC) 440    QTc 488    P Axis (Degrees) 23    R Axis (Degrees) -28    T Axis (Degrees) -22    REPORT TEXT      Normal sinus rhythm  Minimal voltage criteria for LVH, may be normal variant  (  R in aVL  )  Possible  Anterolateral infarct  , age undetermined  Prolonged QT  Abnormal ECG  When compared with ECG of  16-MAR-2024 22:06,  Borderline criteria for  Anterior infarct  are now  present  Borderline criteria for  Anterolateral infarct  are now  present  Criteria for  Inferior infarct  are no longer  present  T wave inversion now evident in  Anterior leads  Confirmed by DANITZA AGOSTO, [facility] ([id number]) on 3/17/2024 9:03:37 AM        Most Recent ECHO: No results found for this or any previous visit.      Microbiology Results       None            Assessment/Plan:    Yovani Roth 63 year old male with peptic ulcer disease, depression, alcoholic neuropathy, COPD, seizures, macrocytic anemia, CKD 3 ,alcoholic cirrhosis with ascites, history of esophageal banding hereditary hemochromatosis with a paracentesis on 4/12/2024 with 9 L of fluid removed  hospitalized for  worsening creat, elevated ammonia and abdominal pain    * Abdominal pain  Assessment & Plan  Consult GI, IR, consult ID, IV Zosyn    Bandemia  Assessment & Plan  trend    Macrocytic anemia  Assessment &  Plan  monitor    Gastroesophageal reflux disease without esophagitis  Assessment & Plan  Pantoprazole    Moderate episode of recurrent major depressive disorder (CMD)  Assessment & Plan  Prozac    Other emphysema (CMD)  Assessment & Plan    Combivent    Seizure disorder (CMD)  Assessment & Plan  Keppra    Alcoholic polyneuropathy (CMD)  Assessment & Plan  Gabapentin    Worsening renal function  Assessment & Plan  Hold diuretics    Hyperammonemia (CMD)  Assessment & Plan  Lactulose    Hepatic encephalopathy (CMD)  Assessment & Plan  Lactulose    Cirrhosis of liver with ascites, unspecified hepatic cirrhosis type (CMD)  Assessment & Plan  Consult GI         I personally reviewed ecg and agree with interpretation.    The emergency room physician and I discussed different diagnoses and treatment methods.    DVT prophylaxis chemopropphylaxis    I discussed diagnosis , testing and results with patient. They have no further questions.         Primary Care Physician  Pcp, No  sent copy        Code Status    Code Status: Full Resuscitation      Camila Childs MD    [facility] [place] Hospitalist  4/16/2024 9:35 PM

## 2025-06-22 NOTE — ASSESSMENT & PLAN NOTE
Patient is a 65-year-old male presenting with abdominal discomfort found to be a small bowel obstruction. Past medical history is notable for Crohn's disease with a ileocolectomy with anastomosis and ileostomy in 2018 currently on sulfasalazine (500 mg 4 times a day), history of methamphetamine use, history of tobacco use up until October 2024.  Patient presenting with severe abdominal discomfort, CT imaging concerning for partial obstruction most likely secondary to adhesions of ileocolonic anastomosis.  Patient was evaluated by surgery who determined he did not need any urgent surgical evaluation.    It appears patient has been only on sulfasalazine despite multiple abdominal surgeries for his Crohn's disease.  Patient unsure why he was not given a biologic medication in the past.  It appears that patient's Crohn's symptoms are not well-controlled with the sulfasalazine still endorsing multiple watery bowel movements daily without blood.  Patient is due for a outpatient colonoscopy for further evaluation however this is now his second admission within the month for concerns of small bowel obstruction.  Last colonoscopy was in 2016 however he has not had one recently.  Patient would benefit from inpatient stool studies.    - Obtain stool studies like stool ova, enteric panel, and C. Difficile  -Check CRP and fecal calprotectin  -Inpatient CTE to evaluate small bowel and look for any new stricturing which could explain ongoing partial obstructions  -Patient will need a close follow-up outpatient with the IBD clinic for further management of medications to treat his Crohn's disease  -Continue PTA sulfasalazine for now

## 2025-06-23 LAB
ANION GAP SERPL CALCULATED.3IONS-SCNC: 7 MMOL/L (ref 4–13)
ATRIAL RATE: 318 BPM
BASOPHILS # BLD AUTO: 0.03 THOUSANDS/ÂΜL (ref 0–0.1)
BASOPHILS NFR BLD AUTO: 0 % (ref 0–1)
BUN SERPL-MCNC: 16 MG/DL (ref 5–25)
C COLI+JEJUNI TUF STL QL NAA+PROBE: NEGATIVE
C DIFF TOX GENS STL QL NAA+PROBE: NEGATIVE
CALCIUM SERPL-MCNC: 7.5 MG/DL (ref 8.4–10.2)
CALPROTECTIN STL-MCNC: 1430 ÂΜG/G
CHLORIDE SERPL-SCNC: 102 MMOL/L (ref 96–108)
CO2 SERPL-SCNC: 24 MMOL/L (ref 21–32)
CREAT SERPL-MCNC: 1.55 MG/DL (ref 0.6–1.3)
D DIMER PPP FEU-MCNC: 1.43 UG/ML FEU
EC STX1+STX2 GENES STL QL NAA+PROBE: NEGATIVE
EOSINOPHIL # BLD AUTO: 0.17 THOUSAND/ÂΜL (ref 0–0.61)
EOSINOPHIL NFR BLD AUTO: 2 % (ref 0–6)
ERYTHROCYTE [DISTWIDTH] IN BLOOD BY AUTOMATED COUNT: 15.5 % (ref 11.6–15.1)
GFR SERPL CREATININE-BSD FRML MDRD: 46 ML/MIN/1.73SQ M
GLUCOSE SERPL-MCNC: 107 MG/DL (ref 65–140)
GLUCOSE SERPL-MCNC: 109 MG/DL (ref 65–140)
GLUCOSE SERPL-MCNC: 113 MG/DL (ref 65–140)
GLUCOSE SERPL-MCNC: 119 MG/DL (ref 65–140)
GLUCOSE SERPL-MCNC: 133 MG/DL (ref 65–140)
HCT VFR BLD AUTO: 29 % (ref 36.5–49.3)
HGB BLD-MCNC: 8.9 G/DL (ref 12–17)
IMM GRANULOCYTES # BLD AUTO: 0.03 THOUSAND/UL (ref 0–0.2)
IMM GRANULOCYTES NFR BLD AUTO: 0 % (ref 0–2)
LYMPHOCYTES # BLD AUTO: 1.31 THOUSANDS/ÂΜL (ref 0.6–4.47)
LYMPHOCYTES NFR BLD AUTO: 17 % (ref 14–44)
MAGNESIUM SERPL-MCNC: 2 MG/DL (ref 1.9–2.7)
MCH RBC QN AUTO: 26.1 PG (ref 26.8–34.3)
MCHC RBC AUTO-ENTMCNC: 30.7 G/DL (ref 31.4–37.4)
MCV RBC AUTO: 85 FL (ref 82–98)
MONOCYTES # BLD AUTO: 1.51 THOUSAND/ÂΜL (ref 0.17–1.22)
MONOCYTES NFR BLD AUTO: 19 % (ref 4–12)
NEUTROPHILS # BLD AUTO: 4.73 THOUSANDS/ÂΜL (ref 1.85–7.62)
NEUTS SEG NFR BLD AUTO: 62 % (ref 43–75)
NRBC BLD AUTO-RTO: 0 /100 WBCS
P AXIS: 259 DEGREES
PLATELET # BLD AUTO: 155 THOUSANDS/UL (ref 149–390)
PMV BLD AUTO: 11 FL (ref 8.9–12.7)
POTASSIUM SERPL-SCNC: 3.6 MMOL/L (ref 3.5–5.3)
QRS AXIS: 60 DEGREES
QRSD INTERVAL: 110 MS
QT INTERVAL: 300 MS
QTC INTERVAL: 488 MS
RBC # BLD AUTO: 3.41 MILLION/UL (ref 3.88–5.62)
SALMONELLA SP SPAO STL QL NAA+PROBE: NEGATIVE
SHIGELLA SP+EIEC IPAH STL QL NAA+PROBE: NEGATIVE
SODIUM SERPL-SCNC: 133 MMOL/L (ref 135–147)
T WAVE AXIS: -28 DEGREES
VENTRICULAR RATE: 159 BPM
WBC # BLD AUTO: 7.78 THOUSAND/UL (ref 4.31–10.16)

## 2025-06-23 PROCEDURE — 93010 ELECTROCARDIOGRAM REPORT: CPT | Performed by: INTERNAL MEDICINE

## 2025-06-23 PROCEDURE — 99232 SBSQ HOSP IP/OBS MODERATE 35: CPT | Performed by: INTERNAL MEDICINE

## 2025-06-23 PROCEDURE — 82948 REAGENT STRIP/BLOOD GLUCOSE: CPT

## 2025-06-23 PROCEDURE — 83735 ASSAY OF MAGNESIUM: CPT | Performed by: PHYSICIAN ASSISTANT

## 2025-06-23 PROCEDURE — 94640 AIRWAY INHALATION TREATMENT: CPT

## 2025-06-23 PROCEDURE — 85379 FIBRIN DEGRADATION QUANT: CPT | Performed by: PHYSICIAN ASSISTANT

## 2025-06-23 PROCEDURE — 94664 DEMO&/EVAL PT USE INHALER: CPT

## 2025-06-23 PROCEDURE — 99222 1ST HOSP IP/OBS MODERATE 55: CPT | Performed by: INTERNAL MEDICINE

## 2025-06-23 PROCEDURE — 80048 BASIC METABOLIC PNL TOTAL CA: CPT | Performed by: PHYSICIAN ASSISTANT

## 2025-06-23 PROCEDURE — 94760 N-INVAS EAR/PLS OXIMETRY 1: CPT

## 2025-06-23 PROCEDURE — 85025 COMPLETE CBC W/AUTO DIFF WBC: CPT | Performed by: PHYSICIAN ASSISTANT

## 2025-06-23 RX ORDER — SODIUM CHLORIDE 9 MG/ML
75 INJECTION, SOLUTION INTRAVENOUS CONTINUOUS
Status: DISCONTINUED | OUTPATIENT
Start: 2025-06-23 | End: 2025-06-23

## 2025-06-23 RX ORDER — SODIUM CHLORIDE 9 MG/ML
75 INJECTION, SOLUTION INTRAVENOUS CONTINUOUS
Status: DISPENSED | OUTPATIENT
Start: 2025-06-23 | End: 2025-06-24

## 2025-06-23 RX ADMIN — SODIUM CHLORIDE 75 ML/HR: 0.9 INJECTION, SOLUTION INTRAVENOUS at 11:55

## 2025-06-23 RX ADMIN — UMECLIDINIUM 1 PUFF: 62.5 AEROSOL, POWDER ORAL at 11:54

## 2025-06-23 RX ADMIN — MORPHINE SULFATE 4 MG: 4 INJECTION INTRAVENOUS at 10:23

## 2025-06-23 RX ADMIN — SODIUM CHLORIDE, SODIUM GLUCONATE, SODIUM ACETATE, POTASSIUM CHLORIDE, MAGNESIUM CHLORIDE, SODIUM PHOSPHATE, DIBASIC, AND POTASSIUM PHOSPHATE 500 ML: .53; .5; .37; .037; .03; .012; .00082 INJECTION, SOLUTION INTRAVENOUS at 00:12

## 2025-06-23 RX ADMIN — MORPHINE SULFATE 4 MG: 4 INJECTION INTRAVENOUS at 22:27

## 2025-06-23 RX ADMIN — PANTOPRAZOLE SODIUM 40 MG: 40 TABLET, DELAYED RELEASE ORAL at 09:30

## 2025-06-23 RX ADMIN — ASPIRIN 81 MG CHEWABLE TABLET 81 MG: 81 TABLET CHEWABLE at 09:32

## 2025-06-23 RX ADMIN — TRAZODONE HYDROCHLORIDE 100 MG: 100 TABLET ORAL at 22:21

## 2025-06-23 RX ADMIN — SULFASALAZINE 500 MG: 500 TABLET ORAL at 11:54

## 2025-06-23 RX ADMIN — LEVALBUTEROL HYDROCHLORIDE 1.25 MG: 1.25 SOLUTION RESPIRATORY (INHALATION) at 06:56

## 2025-06-23 RX ADMIN — SULFASALAZINE 500 MG: 500 TABLET ORAL at 17:17

## 2025-06-23 RX ADMIN — ENOXAPARIN SODIUM 40 MG: 40 INJECTION SUBCUTANEOUS at 09:30

## 2025-06-23 RX ADMIN — SULFASALAZINE 500 MG: 500 TABLET ORAL at 22:21

## 2025-06-23 RX ADMIN — ATORVASTATIN CALCIUM 40 MG: 40 TABLET, FILM COATED ORAL at 16:51

## 2025-06-23 RX ADMIN — MORPHINE SULFATE 4 MG: 4 INJECTION INTRAVENOUS at 00:10

## 2025-06-23 RX ADMIN — MORPHINE SULFATE 4 MG: 4 INJECTION INTRAVENOUS at 16:52

## 2025-06-23 RX ADMIN — LEVALBUTEROL HYDROCHLORIDE 1.25 MG: 1.25 SOLUTION RESPIRATORY (INHALATION) at 20:24

## 2025-06-23 RX ADMIN — METOROPROLOL TARTRATE 2.5 MG: 5 INJECTION, SOLUTION INTRAVENOUS at 00:07

## 2025-06-23 RX ADMIN — FLUTICASONE FUROATE AND VILANTEROL TRIFENATATE 1 PUFF: 200; 25 POWDER RESPIRATORY (INHALATION) at 11:54

## 2025-06-23 NOTE — PROGRESS NOTES
Progress Note - Hospitalist   Name: Mathew Bird 65 y.o. male I MRN: [id number]  Unit/Bed#: [facility] 923-01 I Date of Admission: 6/20/2025   Date of Service: 6/23/2025 I Hospital Day: 3     Assessment & Plan  SBO (small bowel obstruction) ([facility])  Patient presents with abdominal pain nausea  CT findings significant for dilated small bowel loops proximal to ileocolonic anastomosis measuring 5cm concerning for partial obstruction  Patient continues with episodes severe abdominal pain and nausea  Denies diarrhea  GI plans for small bowel enterography noted  Follow-up on stool studies  Advance diet as tolerated  Analgesics, antiemetics, supportive cares  GI following  Monitor closely  Paroxysmal atrial fibrillation with rapid ventricular response ([facility])  Overnight noted to have atrial fibrillation with rapid ventricular response  Received metoprolol with improved rates  Will request cardiology evaluation  Discussed with cardiology rec secure chat  Not on anticoagulation  Cardiology following  Hypotension  Patient with hypotension  Remains asymptomatic  Monitor orthostatics  Will provide IV fluids  Monitor blood pressures  Avoid hypotension  Coronary artery disease involving native coronary artery of native heart without angina pectoris  Continue aspirin, metoprolol, statin  Crohn disease ([facility])  s/p ilela colectomy with anastomosis and ileostomy which has been reversed October 2018   Continue sulfasalazine  GI following  Stage 4 very severe COPD by GOLD classification ([facility])  Continue Eyad Rhodes  Type 2 diabetes mellitus, without long-term current use of insulin ([facility])  Lab Results   Component Value Date    HGBA1C 6.1 (H) 04/09/2025       Recent Labs     06/22/25  1105 06/22/25  1739 06/22/25  2340 06/23/25  0624   POCGLU 126 105 161* 109       Blood Sugar Average: Last 72 hrs:  (P) 122.75  At home on metformin 750 mg daily  Diabetes mellitus type 2 controlled  Accu-Cheks reviewed, acceptable  Monitor Accu-Cheks  Avoid  hypoglycemia                    VTE Pharmacologic Prophylaxis: VTE Score: 3 Moderate Risk (Score 3-4) - Pharmacological DVT Prophylaxis Ordered: enoxaparin (Lovenox).    Mobility:   Basic Mobility Inpatient Raw Score: 24  JH-HLM Goal: 8: Walk 250 feet or more  JH-HLM Achieved: 6: Walk 10 steps or more  JH-HLM Goal achieved. Continue to encourage appropriate mobility.    Patient Centered Rounds: I performed bedside rounds with nursing staff today.   Discussions with Specialists or Other Care Team Provider: Cardiology, case management    Education and Discussions with Family / Patient: Discussed with the patient, call left a message for daughter Amparo.     Current Length of Stay: 3 day(s)  Current Patient Status: Inpatient   Certification Statement: The patient will continue to require additional inpatient hospital stay due to history of Crohn's ongoing severe abdominal pain nausea GI plans for abdominal imaging, hypotension atrial fibrillation RVR cardiology following receiving IV fluids  Discharge Plan: History of Crohn's disease ongoing severe abdominal pain GI following with plans for abdominal imaging, atrial fibrillation RVR cardiology following receiving IV fluids    Code Status: Level 1 - Full Code    Subjective     Comfortably in bed  Continues to have 9/10 diffuse severe abdominal pain  Reports nausea  No episodes of diarrhea  Reports fatigue generalized weakness  Encourage out of bed into chair  Discussed with RN  Overnight noted to have atrial fibrillation with rapid ventricular response received metoprolol with improvement in rates      Objective :  Temp:  [98.1 °F (36.7 °C)-99.3 °F (37.4 °C)] 98.1 °F (36.7 °C)  HR:  [] 79  BP: ()/(36-66) 92/42  Resp:  [17-20] 17  SpO2:  [90 %-100 %] 97 %  O2 Device: Nasal cannula  Nasal Cannula O2 Flow Rate (L/min):  [4 L/min] 4 L/min    Body mass index is 31.49 kg/m².     Input and Output Summary (last 24 hours):   No intake or output data in the 24 hours  ending 06/23/25 1033    Physical Exam        Comfortably in bed  Obese  Short thick neck  Lungs diminished breath sounds  Heart sounds S1-S2 noted  Heart sounds are distant  Abdomen soft  Diffuse abdominal tenderness  Abdominal obesity noted  Awake follows commands  No pedal edema  No rash    Lines/Drains:        Telemetry:  Telemetry Orders (From admission, onward)               24 Hour Telemetry Monitoring  Continuous x 24 Hours (Telem)        Expiring   Question:  Reason for 24 Hour Telemetry  Answer:  Arrhythmias requiring acute medical intervention / PPM or ICD malfunction                     Telemetry Reviewed: Atrial fibrillation  Indication for Continued Telemetry Use: Arrthymias requiring medical therapy               Lab Results: I have reviewed the following results:   Results from last 7 days   Lab Units 06/23/25  0023   WBC Thousand/uL 7.78   HEMOGLOBIN g/dL 8.9*   HEMATOCRIT % 29.0*   PLATELETS Thousands/uL 155   SEGS PCT % 62   LYMPHO PCT % 17   MONO PCT % 19*   EOS PCT % 2     Results from last 7 days   Lab Units 06/23/25  0023 06/21/25  0509 06/20/25  0946   SODIUM mmol/L 133*   < > 143   POTASSIUM mmol/L 3.6   < > 4.5   CHLORIDE mmol/L 102   < > 106   CO2 mmol/L 24   < > 29   BUN mg/dL 16   < > 19   CREATININE mg/dL 1.55*   < > 1.45*   ANION GAP mmol/L 7   < > 8   CALCIUM mg/dL 7.5*   < > 9.5   ALBUMIN g/dL  --   --  4.2   TOTAL BILIRUBIN mg/dL  --   --  0.35   ALK PHOS U/L  --   --  86   ALT U/L  --   --  13   AST U/L  --   --  16   GLUCOSE RANDOM mg/dL 119   < > 120    < > = values in this interval not displayed.         Results from last 7 days   Lab Units 06/23/25  0624 06/22/25  2340 06/22/25  1739 06/22/25  1105 06/22/25  0628 06/22/25  0014 06/21/25  2108 06/21/25  1623 06/21/25  1101 06/21/25  0709 06/21/25  0557 06/20/25  1803   POC GLUCOSE mg/dl 109 161* 105 126 180* 111 109 111 95 124 124 118         Results from last 7 days   Lab Units 06/20/25  1144   LACTIC ACID mmol/L 0.5        Recent Cultures (last 7 days):         Imaging Results Review: I personally reviewed the following image studies/reports in PACS and discussed pertinent findings with Radiology: CT abdomen/pelvis and Echocardiogram. My interpretation of the radiology images/reports is: Lab results reviewed.      Last 24 Hours Medication List:     Current Facility-Administered Medications:     albuterol (PROVENTIL HFA,VENTOLIN HFA) inhaler 2 puff, Q4H PRN    aspirin chewable tablet 81 mg, Daily    atorvastatin (LIPITOR) tablet 40 mg, Daily With Dinner    enoxaparin (LOVENOX) subcutaneous injection 40 mg, Q24H JUVENTINO    fluticasone-vilanterol 200-25 mcg/actuation 1 puff, Daily **AND** umeclidinium 62.5 mcg/actuation inhaler AEPB 1 puff, Daily    insulin lispro (HumALOG/ADMELOG) 100 units/mL subcutaneous injection 2-12 Units, TID With Meals **AND** Fingerstick Glucose (POCT), Q6H    levalbuterol (XOPENEX) inhalation solution 1.25 mg, TID    metoprolol (LOPRESSOR) injection 2.5 mg, Q6H PRN    metoprolol succinate (TOPROL-XL) 24 hr tablet 25 mg, HS    morphine injection 2 mg, Q6H PRN    morphine injection 4 mg, Q6H PRN    naloxone (NARCAN) 0.04 mg/mL syringe 0.04 mg, Q1MIN PRN    ondansetron (ZOFRAN) injection 4 mg, Q8H PRN    pantoprazole (PROTONIX) EC tablet 40 mg, Daily    sodium chloride 0.9 % infusion, Continuous    sulfaSALAzine (AZULFIDINE) tablet 500 mg, 4x Daily    traZODone (DESYREL) tablet 100 mg, HS    Administrative Statements   Today, Patient Was Seen By: Monik Medina MD  I have spent a total time of 35 minutes in caring for this patient on the day of the visit/encounter including Diagnostic results, Risks and benefits of tx options, Instructions for management, Patient and family education, Importance of tx compliance, Risk factor reductions, Impressions, Counseling / Coordination of care, Documenting in the medical record, Reviewing/placing orders in the medical record (including tests, medications, and/or  procedures), Obtaining or reviewing history  , and Communicating with other healthcare professionals .    **Please Note: This note may have been constructed using a voice recognition system.**

## 2025-06-23 NOTE — PLAN OF CARE
Problem: PAIN - ADULT  Goal: Verbalizes/displays adequate comfort level or baseline comfort level  Description: Interventions:  - Encourage patient to monitor pain and request assistance  - Assess pain using appropriate pain scale  - Administer analgesics as ordered based on type and severity of pain and evaluate response  - Implement non-pharmacological measures as appropriate and evaluate response  - Consider cultural and social influences on pain and pain management  - Notify physician/advanced practitioner if interventions unsuccessful or patient reports new pain  - Educate patient/family on pain management process including their role and importance of  reporting pain   - Provide non-pharmacologic/complimentary pain relief interventions  Outcome: Progressing     Problem: INFECTION - ADULT  Goal: Absence or prevention of progression during hospitalization  Description: INTERVENTIONS:  - Assess and monitor for signs and symptoms of infection  - Monitor lab/diagnostic results  - Monitor all insertion sites, i.e. indwelling lines, tubes, and drains  - Monitor endotracheal if appropriate and nasal secretions for changes in amount and color  - [place] appropriate cooling/warming therapies per order  - Administer medications as ordered  - Instruct and encourage patient and family to use good hand hygiene technique  - Identify and instruct in appropriate isolation precautions for identified infection/condition  Outcome: Progressing  Goal: Absence of fever/infection during neutropenic period  Description: INTERVENTIONS:  - Monitor WBC  - Perform strict hand hygiene  - Limit to healthy visitors only  - No plants, dried, fresh or silk flowers with bae in patient room  Outcome: Progressing     Problem: SAFETY ADULT  Goal: Patient will remain free of falls  Description: INTERVENTIONS:  - Educate patient/family on patient safety including physical limitations  - Instruct patient to call for assistance with activity   -  Consider consulting OT/PT to assist with strengthening/mobility based on AM PAC & JH-HLM score  - Consult OT/PT to assist with strengthening/mobility   - Keep Call bell within reach  - Keep bed low and locked with side rails adjusted as appropriate  - Keep care items and personal belongings within reach  - Initiate and maintain comfort rounds  - Make Fall Risk Sign visible to staff  - Offer Toileting every  Hours, in advance of need  - Initiate/Maintain alarm  - Obtain necessary fall risk management equipment:   - Apply yellow socks and bracelet for high fall risk patients  - Consider moving patient to room near nurses station  Outcome: Progressing  Goal: Maintain or return to baseline ADL function  Description: INTERVENTIONS:  -  Assess patient's ability to carry out ADLs; assess patient's baseline for ADL function and identify physical deficits which impact ability to perform ADLs (bathing, care of mouth/teeth, toileting, grooming, dressing, etc.)  - Assess/evaluate cause of self-care deficits   - Assess range of motion  - Assess patient's mobility; develop plan if impaired  - Assess patient's need for assistive devices and provide as appropriate  - Encourage maximum independence but intervene and supervise when necessary  - Involve family in performance of ADLs  - Assess for home care needs following discharge   - Consider OT consult to assist with ADL evaluation and planning for discharge  - Provide patient education as appropriate  - Monitor functional capacity and physical performance, use of AM PAC & JH-HLM   - Monitor gait, balance and fatigue with ambulation    Outcome: Progressing  Goal: Maintains/Returns to pre admission functional level  Description: INTERVENTIONS:  - Perform AM-PAC 6 Click Basic Mobility/ Daily Activity assessment daily.  - Set and communicate daily mobility goal to care team and patient/family/caregiver.   - Collaborate with rehabilitation services on mobility goals if consulted  -  Perform Range of Motion times a day.  - Reposition patient every  hours.  - Dangle patient  times a day  - Stand patient  times a day  - Ambulate patient  times a day  - Out of bed to chair  times a day   - Out of bed for meals times a day  - Out of bed for toileting  - Record patient progress and toleration of activity level   Outcome: Progressing     Problem: DISCHARGE PLANNING  Goal: Discharge to home or other facility with appropriate resources  Description: INTERVENTIONS:  - Identify barriers to discharge w/patient and caregiver  - Arrange for needed discharge resources and transportation as appropriate  - Identify discharge learning needs (meds, wound care, etc.)  - Arrange for interpretive services to assist at discharge as needed  - Refer to Case Management Department for coordinating discharge planning if the patient needs post-hospital services based on physician/advanced practitioner order or complex needs related to functional status, cognitive ability, or social support system  Outcome: Progressing     Problem: Knowledge Deficit  Goal: Patient/family/caregiver demonstrates understanding of disease process, treatment plan, medications, and discharge instructions  Description: Complete learning assessment and assess knowledge base.  Interventions:  - Provide teaching at level of understanding  - Provide teaching via preferred learning methods  Outcome: Progressing

## 2025-06-23 NOTE — ASSESSMENT & PLAN NOTE
Mathew Bird is a 65 y.o. male with a PMHx significant for ventricular tachycardia, CAD with KERRIE to RCA in 2012, paroxysmal atrial fibrillation, dyslipidemia, and COPD who presents with heart palpitations secondary to paroxysmal atrial fibrillation with RVR. Pt currently denies SOB, chest pain, edema, and palpitations since starting metoprolol. Patient is no longer tachycardic.   In 2018 during another hospitalization for colitis, pt had a similar episode of paroxysmal afib and was placed on amiodarone for a short duration. Due to low CHADS-VASc score of 1, he was not placed on additional anticoagulants.     Plan:  Patient has converted back to sinus rhythm, so may continue home dose of metoprolol 25 mg  Due to CHADS-VASc of 2, recommend initiating anticoagulation therapy. This was discussed with pt and he is agreeable to this. Recommend eliquis or xarelto and price checking of medications.   Monitor on telemetry  Follow up with outpatient cardiology and obtain echo and nuclear stress test.

## 2025-06-23 NOTE — RESPIRATORY THERAPY NOTE
06/23/25 0700   Inhalation Therapy Tx   $ Inhalation Therapy Performed Yes   $ Pulse Oximetry Spot Check Charge Completed   SpO2 100 %   Pre-Treatment Pulse 81   Pre-Treatment Respirations 20   Duration 10   Breath Sounds Pre-Treatment Bilateral Diminished;Expiratory wheeze   Breath Sounds Post-Treatment Bilateral Diminished;Expiratory wheezes   Post-Treatment Pulse 75   Delivery Source UDN;Air   Position Semi Alford's   Treatment Tolerance Tolerated well   Resp Comments Pt awake and alert. Treatment given at this time. Will continue to monitor per RCP.

## 2025-06-23 NOTE — ASSESSMENT & PLAN NOTE
Patient presents with abdominal pain nausea  CT findings significant for dilated small bowel loops proximal to ileocolonic anastomosis measuring 5cm concerning for partial obstruction  Patient continues with episodes severe abdominal pain and nausea  Denies diarrhea  GI plans for small bowel enterography noted  Follow-up on stool studies  Advance diet as tolerated  Analgesics, antiemetics, supportive cares  GI following  Monitor closely

## 2025-06-23 NOTE — ASSESSMENT & PLAN NOTE
Overnight noted to have atrial fibrillation with rapid ventricular response  Received metoprolol with improved rates  Will request cardiology evaluation  Discussed with cardiology rec secure chat  Not on anticoagulation  Cardiology following

## 2025-06-23 NOTE — ASSESSMENT & PLAN NOTE
Lab Results   Component Value Date    HGBA1C 6.1 (H) 04/09/2025       Recent Labs     06/22/25  1105 06/22/25  1739 06/22/25  2340 06/23/25  0624   POCGLU 126 105 161* 109       Blood Sugar Average: Last 72 hrs:  (P) 122.75  At home on metformin 750 mg daily  Diabetes mellitus type 2 controlled  Accu-Cheks reviewed, acceptable  Monitor Accu-Cheks  Avoid hypoglycemia

## 2025-06-23 NOTE — ASSESSMENT & PLAN NOTE
s/p ilela colectomy with anastomosis and ileostomy which has been reversed October 2018   Continue sulfasalazine  GI following

## 2025-06-23 NOTE — CONSULTS
Consultation - Cardiology   Name: Mathew Bird 65 y.o. male I MRN: [id number]  Unit/Bed#: [facility] 923-01 I Date of Admission: 6/20/2025   Date of Service: 6/23/2025 I Hospital Day: 3   Consults  Physician Requesting Evaluation: Monik Medina, *   Reason for Evaluation / Principal Problem: Paroxysmal atrial fibrillation    Assessment & Plan  Paroxysmal atrial fibrillation with rapid ventricular response (HCC)  Mathew Bird is a 65 y.o. male with a PMHx significant for ventricular tachycardia, CAD with KERRIE to RCA in 2012, paroxysmal atrial fibrillation, dyslipidemia, and COPD who presents with heart palpitations secondary to paroxysmal atrial fibrillation with RVR. Pt currently denies SOB, chest pain, edema, and palpitations since starting metoprolol. Patient is no longer tachycardic.   In 2018 during another hospitalization for colitis, pt had a similar episode of paroxysmal afib and was placed on amiodarone for a short duration. Due to low CHADS-VASc score of 1, he was not placed on additional anticoagulants.     Plan:  Patient has converted back to sinus rhythm, so may continue home dose of metoprolol 25 mg  Due to CHADS-VASc of 2, recommend initiating anticoagulation therapy. This was discussed with pt and he is agreeable to this. Recommend eliquis or xarelto and price checking of medications.   Monitor on telemetry  Follow up with outpatient cardiology and obtain echo and nuclear stress test.     SBO (small bowel obstruction) ([facility])  Defer to primary team  Crohn disease ([facility])  Defer to primary team  Coronary artery disease involving native coronary artery of native heart without angina pectoris  Continue aspirin 81 mg, metoprolol 25 mg, and atorvastatin 40 mg  Stage 4 very severe COPD by GOLD classification ([facility])  Defer to primary team  Hypotension  Continue to monitor BP and administer IV fluids.    Type 2 diabetes mellitus, without long-term current use of insulin ([facility])  Lab Results   Component  Value Date    HGBA1C 6.1 (H) 04/09/2025       Recent Labs     06/22/25  1739 06/22/25  2340 06/23/25  0624 06/23/25  1146   POCGLU 105 161* 109 113       Blood Sugar Average: Last 72 hrs:  (P) 122      History of Present Illness   Mathew Bird is a 65 y.o. male who presents with heart palpitations secondary to paroxysmal atrial fibrillation with RVR. He has a PMHx significant for ventricular tachycardia, CAD with KERRIE to RCA in 2012, paroxysmal atrial fibrillation, dyslipidemia, and COPD. In 2018 during another hospitalization for colitis, pt had a similar episode of paroxysmal afib, converted to sinus rhythm, and was placed on amiodarone for a short duration. Due to low CHADS-VASc score of 1, he was not placed on additional anticoagulants.     Pt currently denies acute SOB, chest pain, or edema. He reports having heart palpitations while in the hospital but has not had any since beginning metoprolol. He says that he is compliant with his aspirin in the outpatient setting and will occasionally feel heart palpitations and chest pain on exertion. He cannot remember the last episode of atrial fibrillation or being placed on amiodarone.     Review of Systems   Constitutional:  Negative for diaphoresis.   Cardiovascular:  Positive for palpitations. Negative for chest pain and leg swelling.   Neurological:  Negative for dizziness.     Medical History Review: I have reviewed the patient's PMH, PSH, Social History, Family History, Meds, and Allergies     Objective :  Temp:  [98.1 °F (36.7 °C)-99.3 °F (37.4 °C)] 98.1 °F (36.7 °C)  HR:  [] 79  BP: ()/(36-66) 102/46  Resp:  [17-20] 17  SpO2:  [90 %-100 %] 97 %  O2 Device: Nasal cannula  Nasal Cannula O2 Flow Rate (L/min):  [4 L/min] 4 L/min  Orthostatic Blood Pressures      Flowsheet Row Most Recent Value   Blood Pressure 102/46 filed at 06/23/2025 1147   Patient Position - Orthostatic VS Sitting filed at 06/23/2025 0819          First Weight: Weight - Scale:  89.9 kg (198 lb 3.2 oz) (06/21/25 0503)  Vitals:    06/21/25 0503 06/22/25 2309   Weight: 89.9 kg (198 lb 3.2 oz) 88.5 kg (195 lb 1.7 oz)       Physical Exam  Vitals reviewed.   Constitutional:       General: He is not in acute distress.  Neck:      Vascular: No JVD.     Cardiovascular:      Rate and Rhythm: Normal rate and regular rhythm.      Pulses:           Radial pulses are 2+ on the right side and 2+ on the left side.        Dorsalis pedis pulses are 2+ on the right side and 2+ on the left side.      Heart sounds: No murmur heard.     No friction rub. No gallop.   Pulmonary:      Breath sounds: Rhonchi present.     Musculoskeletal:      Right lower leg: No edema.      Left lower leg: No edema.     Skin:     General: Skin is warm and dry.      Capillary Refill: Capillary refill takes less than 2 seconds.     Neurological:      Mental Status: He is alert and oriented to person, place, and time.         Lab Results: I have reviewed the following results:  Results from last 7 days   Lab Units 06/23/25  0023 06/22/25  0518 06/21/25  0509   WBC Thousand/uL 7.78 11.02* 11.66*   HEMOGLOBIN g/dL 8.9* 10.1* 10.0*   HEMATOCRIT % 29.0* 33.0* 32.9*   PLATELETS Thousands/uL 155 165 176     Results from last 7 days   Lab Units 06/23/25  0023 06/22/25  0518 06/21/25  0509   POTASSIUM mmol/L 3.6 4.2 4.0   CHLORIDE mmol/L 102 106 105   CO2 mmol/L 24 23 27   BUN mg/dL 16 19 20   CREATININE mg/dL 1.55* 1.47* 1.47*   CALCIUM mg/dL 7.5* 8.1* 8.5         Lab Results   Component Value Date    HGBA1C 6.1 (H) 04/09/2025     Lab Results   Component Value Date    CKTOTAL 825 (H) 02/03/2024    CKMB 9.2 (H) 05/15/2018    CKMBINDEX 3.1 (H) 05/15/2018    TROPONINI <0.02 08/07/2018       Imaging Results Review: No pertinent imaging studies reviewed.  Other Study Results Review: EKG was reviewed.     VTE Prophylaxis: VTE covered by:  enoxaparin, Subcutaneous, 40 mg at 06/23/25 0930

## 2025-06-23 NOTE — PLAN OF CARE
Problem: PAIN - ADULT  Goal: Verbalizes/displays adequate comfort level or baseline comfort level  Description: Interventions:  - Encourage patient to monitor pain and request assistance  - Assess pain using appropriate pain scale  - Administer analgesics as ordered based on type and severity of pain and evaluate response  - Implement non-pharmacological measures as appropriate and evaluate response  - Consider cultural and social influences on pain and pain management  - Notify physician/advanced practitioner if interventions unsuccessful or patient reports new pain  - Educate patient/family on pain management process including their role and importance of  reporting pain   - Provide non-pharmacologic/complimentary pain relief interventions  Outcome: Progressing     Problem: INFECTION - ADULT  Goal: Absence or prevention of progression during hospitalization  Description: INTERVENTIONS:  - Assess and monitor for signs and symptoms of infection  - Monitor lab/diagnostic results  - Monitor all insertion sites, i.e. indwelling lines, tubes, and drains  - Monitor endotracheal if appropriate and nasal secretions for changes in amount and color  - [place] appropriate cooling/warming therapies per order  - Administer medications as ordered  - Instruct and encourage patient and family to use good hand hygiene technique  - Identify and instruct in appropriate isolation precautions for identified infection/condition  Outcome: Progressing  Goal: Absence of fever/infection during neutropenic period  Description: INTERVENTIONS:  - Monitor WBC  - Perform strict hand hygiene  - Limit to healthy visitors only  - No plants, dried, fresh or silk flowers with bae in patient room  Outcome: Progressing     Problem: SAFETY ADULT  Goal: Patient will remain free of falls  Description: INTERVENTIONS:  - Educate patient/family on patient safety including physical limitations  - Instruct patient to call for assistance with activity   -  Consider consulting OT/PT to assist with strengthening/mobility based on AM PAC & JH-HLM score  - Consult OT/PT to assist with strengthening/mobility   - Keep Call bell within reach  - Keep bed low and locked with side rails adjusted as appropriate  - Keep care items and personal belongings within reach  - Initiate and maintain comfort rounds  - Make Fall Risk Sign visible to staff  - Offer Toileting every  Hours, in advance of need  - Initiate/Maintain alarm  - Obtain necessary fall risk management equipment:   - Apply yellow socks and bracelet for high fall risk patients  - Consider moving patient to room near nurses station  Outcome: Progressing  Goal: Maintain or return to baseline ADL function  Description: INTERVENTIONS:  -  Assess patient's ability to carry out ADLs; assess patient's baseline for ADL function and identify physical deficits which impact ability to perform ADLs (bathing, care of mouth/teeth, toileting, grooming, dressing, etc.)  - Assess/evaluate cause of self-care deficits   - Assess range of motion  - Assess patient's mobility; develop plan if impaired  - Assess patient's need for assistive devices and provide as appropriate  - Encourage maximum independence but intervene and supervise when necessary  - Involve family in performance of ADLs  - Assess for home care needs following discharge   - Consider OT consult to assist with ADL evaluation and planning for discharge  - Provide patient education as appropriate  - Monitor functional capacity and physical performance, use of AM PAC & JH-HLM   - Monitor gait, balance and fatigue with ambulation    Outcome: Progressing  Goal: Maintains/Returns to pre admission functional level  Description: INTERVENTIONS:  - Perform AM-PAC 6 Click Basic Mobility/ Daily Activity assessment daily.  - Set and communicate daily mobility goal to care team and patient/family/caregiver.   - Collaborate with rehabilitation services on mobility goals if consulted  -  Perform Range of Motion  times a day.  - Reposition patient every  hours.  - Dangle patient  times a day  - Stand patient  times a day  - Ambulate patient  times a day  - Out of bed to chair  times a day   - Out of bed for meals  times a day  - Out of bed for toileting  - Record patient progress and toleration of activity level   Outcome: Progressing     Problem: DISCHARGE PLANNING  Goal: Discharge to home or other facility with appropriate resources  Description: INTERVENTIONS:  - Identify barriers to discharge w/patient and caregiver  - Arrange for needed discharge resources and transportation as appropriate  - Identify discharge learning needs (meds, wound care, etc.)  - Arrange for interpretive services to assist at discharge as needed  - Refer to Case Management Department for coordinating discharge planning if the patient needs post-hospital services based on physician/advanced practitioner order or complex needs related to functional status, cognitive ability, or social support system  Outcome: Progressing     Problem: Knowledge Deficit  Goal: Patient/family/caregiver demonstrates understanding of disease process, treatment plan, medications, and discharge instructions  Description: Complete learning assessment and assess knowledge base.  Interventions:  - Provide teaching at level of understanding  - Provide teaching via preferred learning methods  Outcome: Progressing

## 2025-06-23 NOTE — ASSESSMENT & PLAN NOTE
Lab Results   Component Value Date    HGBA1C 6.1 (H) 04/09/2025       Recent Labs     06/22/25  1739 06/22/25  2340 06/23/25  0624 06/23/25  1146   POCGLU 105 161* 109 113       Blood Sugar Average: Last 72 hrs:  (P) 122

## 2025-06-23 NOTE — ASSESSMENT & PLAN NOTE
Patient with hypotension  Remains asymptomatic  Monitor orthostatics  Will provide IV fluids  Monitor blood pressures  Avoid hypotension

## 2025-06-23 NOTE — QUICK NOTE
Noted to be intermittently tachycardic on masimo. Endorses some intermittent palpitations but largely asymptomatic. EKG showed SVT vs afib with RVR, moved to p9 and placed on telemetry which showed afib with RVR with rates up to 160. Given a few doses of IV lopressor and 500 cc bolus and converted back to NSR.

## 2025-06-24 LAB
GLUCOSE SERPL-MCNC: 128 MG/DL (ref 65–140)
GLUCOSE SERPL-MCNC: 130 MG/DL (ref 65–140)
GLUCOSE SERPL-MCNC: 132 MG/DL (ref 65–140)
GLUCOSE SERPL-MCNC: 158 MG/DL (ref 65–140)

## 2025-06-24 PROCEDURE — 94640 AIRWAY INHALATION TREATMENT: CPT

## 2025-06-24 PROCEDURE — 94760 N-INVAS EAR/PLS OXIMETRY 1: CPT

## 2025-06-24 PROCEDURE — 99232 SBSQ HOSP IP/OBS MODERATE 35: CPT | Performed by: FAMILY MEDICINE

## 2025-06-24 PROCEDURE — 99232 SBSQ HOSP IP/OBS MODERATE 35: CPT | Performed by: INTERNAL MEDICINE

## 2025-06-24 PROCEDURE — 82948 REAGENT STRIP/BLOOD GLUCOSE: CPT

## 2025-06-24 RX ORDER — METOPROLOL SUCCINATE 25 MG/1
25 TABLET, EXTENDED RELEASE ORAL
Status: DISCONTINUED | OUTPATIENT
Start: 2025-06-25 | End: 2025-06-25 | Stop reason: HOSPADM

## 2025-06-24 RX ORDER — METOPROLOL SUCCINATE 25 MG/1
25 TABLET, EXTENDED RELEASE ORAL ONCE
Status: COMPLETED | OUTPATIENT
Start: 2025-06-24 | End: 2025-06-24

## 2025-06-24 RX ORDER — METHYLPREDNISOLONE SODIUM SUCCINATE 40 MG/ML
20 INJECTION, POWDER, LYOPHILIZED, FOR SOLUTION INTRAMUSCULAR; INTRAVENOUS EVERY 8 HOURS SCHEDULED
Status: DISCONTINUED | OUTPATIENT
Start: 2025-06-24 | End: 2025-06-25 | Stop reason: HOSPADM

## 2025-06-24 RX ORDER — INSULIN LISPRO 100 [IU]/ML
2-12 INJECTION, SOLUTION INTRAVENOUS; SUBCUTANEOUS
Status: DISCONTINUED | OUTPATIENT
Start: 2025-06-24 | End: 2025-06-25 | Stop reason: HOSPADM

## 2025-06-24 RX ORDER — ECHINACEA PURPUREA EXTRACT 125 MG
1 TABLET ORAL
Status: DISCONTINUED | OUTPATIENT
Start: 2025-06-24 | End: 2025-06-25 | Stop reason: HOSPADM

## 2025-06-24 RX ORDER — SODIUM CHLORIDE, SODIUM LACTATE, POTASSIUM CHLORIDE, CALCIUM CHLORIDE 600; 310; 30; 20 MG/100ML; MG/100ML; MG/100ML; MG/100ML
125 INJECTION, SOLUTION INTRAVENOUS CONTINUOUS
Status: CANCELLED | OUTPATIENT
Start: 2025-06-24

## 2025-06-24 RX ADMIN — MORPHINE SULFATE 4 MG: 4 INJECTION INTRAVENOUS at 04:42

## 2025-06-24 RX ADMIN — METOPROLOL SUCCINATE 25 MG: 25 TABLET, EXTENDED RELEASE ORAL at 12:56

## 2025-06-24 RX ADMIN — MORPHINE SULFATE 4 MG: 4 INJECTION INTRAVENOUS at 23:16

## 2025-06-24 RX ADMIN — FLUTICASONE FUROATE AND VILANTEROL TRIFENATATE 1 PUFF: 200; 25 POWDER RESPIRATORY (INHALATION) at 08:42

## 2025-06-24 RX ADMIN — ENOXAPARIN SODIUM 40 MG: 40 INJECTION SUBCUTANEOUS at 08:41

## 2025-06-24 RX ADMIN — ATORVASTATIN CALCIUM 40 MG: 40 TABLET, FILM COATED ORAL at 17:16

## 2025-06-24 RX ADMIN — LEVALBUTEROL HYDROCHLORIDE 1.25 MG: 1.25 SOLUTION RESPIRATORY (INHALATION) at 20:13

## 2025-06-24 RX ADMIN — TRAZODONE HYDROCHLORIDE 100 MG: 100 TABLET ORAL at 21:27

## 2025-06-24 RX ADMIN — MORPHINE SULFATE 4 MG: 4 INJECTION INTRAVENOUS at 17:16

## 2025-06-24 RX ADMIN — ASPIRIN 81 MG CHEWABLE TABLET 81 MG: 81 TABLET CHEWABLE at 08:41

## 2025-06-24 RX ADMIN — SULFASALAZINE 500 MG: 500 TABLET ORAL at 17:16

## 2025-06-24 RX ADMIN — INSULIN LISPRO 2 UNITS: 100 INJECTION, SOLUTION INTRAVENOUS; SUBCUTANEOUS at 11:52

## 2025-06-24 RX ADMIN — SULFASALAZINE 500 MG: 500 TABLET ORAL at 08:42

## 2025-06-24 RX ADMIN — SULFASALAZINE 500 MG: 500 TABLET ORAL at 11:43

## 2025-06-24 RX ADMIN — METHYLPREDNISOLONE SODIUM SUCCINATE 20 MG: 40 INJECTION, POWDER, FOR SOLUTION INTRAMUSCULAR; INTRAVENOUS at 21:27

## 2025-06-24 RX ADMIN — MORPHINE SULFATE 4 MG: 4 INJECTION INTRAVENOUS at 11:33

## 2025-06-24 RX ADMIN — UMECLIDINIUM 1 PUFF: 62.5 AEROSOL, POWDER ORAL at 08:42

## 2025-06-24 RX ADMIN — SALINE NASAL SPRAY 1 SPRAY: 1.5 SOLUTION NASAL at 11:44

## 2025-06-24 RX ADMIN — SULFASALAZINE 500 MG: 500 TABLET ORAL at 21:27

## 2025-06-24 RX ADMIN — ONDANSETRON 4 MG: 2 INJECTION, SOLUTION INTRAMUSCULAR; INTRAVENOUS at 11:43

## 2025-06-24 RX ADMIN — PANTOPRAZOLE SODIUM 40 MG: 40 TABLET, DELAYED RELEASE ORAL at 08:41

## 2025-06-24 RX ADMIN — SODIUM CHLORIDE 75 ML/HR: 0.9 INJECTION, SOLUTION INTRAVENOUS at 01:18

## 2025-06-24 RX ADMIN — LEVALBUTEROL HYDROCHLORIDE 1.25 MG: 1.25 SOLUTION RESPIRATORY (INHALATION) at 08:08

## 2025-06-24 RX ADMIN — LEVALBUTEROL HYDROCHLORIDE 1.25 MG: 1.25 SOLUTION RESPIRATORY (INHALATION) at 13:56

## 2025-06-24 NOTE — PROGRESS NOTES
Progress Note - Hospitalist   Name: Mathew Bird 65 y.o. male I MRN: [id number]  Unit/Bed#: [facility] 923-01 I Date of Admission: 6/20/2025   Date of Service: 6/24/2025 I Hospital Day: 4    Assessment & Plan  SBO (small bowel obstruction) ([facility])  Patient presents with abdominal pain nausea  CT findings significant for dilated small bowel loops proximal to ileocolonic anastomosis measuring 5cm concerning for partial obstruction  Patient continues with episodes severe abdominal pain and nausea  Denies diarrhea  Negative for C. difficile and stool culture with PCR  Elevated CRP and fecal calprotectin noted  Advance diet as tolerated- currently tolerating  Analgesics, antiemetics, supportive cares  GI following- awaiting decision regarding Inpatient vs. Outpatient colonoscopy.   Monitor closely  Hemoglobin downtrending, hemoglobin 8.9 today, baseline appears to be 10  Paroxysmal atrial fibrillation with rapid ventricular response ([facility])  Overnight noted to have atrial fibrillation with rapid ventricular response, received multiple dose of IV Lopressor, which resulted in conversion to NSR.  Not on anticoagulation  Cardiology following  Resumed home metoprolol, currently heart rate well-controlled  Sent Eliquis for price check  Hypotension  Patient with hypotension  Remains asymptomatic  Monitor orthostatics  Improved with IV fluid  Coronary artery disease involving native coronary artery of native heart without angina pectoris  Continue aspirin, metoprolol, statin  Probably discontinue aspirin once started on Eliquis  Crohn disease ([facility])  s/p ilela colectomy with anastomosis and ileostomy which has been reversed October 2018   Continue sulfasalazine  GI following  Stage 4 very severe COPD by GOLD classification ([facility])  Continue Eyad Rhodes  Type 2 diabetes mellitus, without long-term current use of insulin ([facility])  Lab Results   Component Value Date    HGBA1C 6.1 (H) 04/09/2025       Recent Labs     06/23/25  1146  06/23/25  1717 06/23/25 2036 06/24/25  0729   POCGLU 113 133 107 130       Blood Sugar Average: Last 72 hrs:  (P) [id number]  At home on metformin 750 mg daily  Diabetes mellitus type 2 controlled  Accu-Cheks reviewed, acceptable  Monitor Accu-Cheks  Avoid hypoglycemia    VTE Pharmacologic Prophylaxis: VTE Score: 3 Moderate Risk (Score 3-4) - Pharmacological DVT Prophylaxis Ordered: enoxaparin (Lovenox).    Mobility:   Basic Mobility Inpatient Raw Score: 23  JH-HLM Goal: 7: Walk 25 feet or more  JH-HLM Achieved: 7: Walk 25 feet or more  JH-HLM Goal achieved. Continue to encourage appropriate mobility.    Patient Centered Rounds: I performed bedside rounds with nursing staff today.   Discussions with Specialists or Other Care Team Provider: Gastroenterology       Current Length of Stay: 4 day(s)  Current Patient Status: Inpatient   Certification Statement: The patient will continue to require additional inpatient hospital stay due to pending GI clearance  Discharge Plan: Anticipate discharge tomorrow to home.    Code Status: Level 1 - Full Code    Subjective   Patient examined at bedside.  Patient reports having 2 episodes of loose bowel movement today.  However nonbloody.  Continues to have right lower quadrant pain.  However tolerating GI diet well.  Heart rate well-controlled at 85    Objective :  Temp:  [98.7 °F (37.1 °C)-98.8 °F (37.1 °C)] 98.8 °F (37.1 °C)  HR:  [75-98] 75  BP: ()/(46-65) 117/63  Resp:  [14-17] 14  SpO2:  [92 %-97 %] 97 %  O2 Device: Nasal cannula  Nasal Cannula O2 Flow Rate (L/min):  [4 L/min] 4 L/min    Body mass index is 31.49 kg/m².     Input and Output Summary (last 24 hours):     Intake/Output Summary (Last 24 hours) at 6/24/2025 1124  Last data filed at 6/24/2025 0112  Gross per 24 hour   Intake 996.25 ml   Output --   Net 996.25 ml       Physical Exam  Vitals and nursing note reviewed.   Constitutional:       General: He is not in acute distress.     Appearance: Normal  appearance.   HENT:      Head: Normocephalic and atraumatic.      Right Ear: External ear normal.      Left Ear: External ear normal.      Nose: Nose normal.     Eyes:      Extraocular Movements: Extraocular movements intact.     Pulmonary:      Effort: Pulmonary effort is normal.     Musculoskeletal:      Cervical back: Normal range of motion.     Neurological:      Mental Status: He is alert. Mental status is at baseline.     Psychiatric:         Mood and Affect: Mood normal.           Lines/Drains:        Telemetry:  Telemetry Orders (From admission, onward)               24 Hour Telemetry Monitoring  Continuous x 24 Hours (Telem)        Expiring   Question:  Reason for 24 Hour Telemetry  Answer:  Arrhythmias requiring acute medical intervention / PPM or ICD malfunction                     Telemetry Reviewed: Normal Sinus Rhythm  Indication for Continued Telemetry Use: Arrthymias requiring medical therapy               Lab Results: I have reviewed the following results:   Results from last 7 days   Lab Units 06/23/25  0023   WBC Thousand/uL 7.78   HEMOGLOBIN g/dL 8.9*   HEMATOCRIT % 29.0*   PLATELETS Thousands/uL 155   SEGS PCT % 62   LYMPHO PCT % 17   MONO PCT % 19*   EOS PCT % 2     Results from last 7 days   Lab Units 06/23/25  0023 06/21/25  0509 06/20/25  0946   SODIUM mmol/L 133*   < > 143   POTASSIUM mmol/L 3.6   < > 4.5   CHLORIDE mmol/L 102   < > 106   CO2 mmol/L 24   < > 29   BUN mg/dL 16   < > 19   CREATININE mg/dL 1.55*   < > 1.45*   ANION GAP mmol/L 7   < > 8   CALCIUM mg/dL 7.5*   < > 9.5   ALBUMIN g/dL  --   --  4.2   TOTAL BILIRUBIN mg/dL  --   --  0.35   ALK PHOS U/L  --   --  86   ALT U/L  --   --  13   AST U/L  --   --  16   GLUCOSE RANDOM mg/dL 119   < > 120    < > = values in this interval not displayed.         Results from last 7 days   Lab Units 06/24/25  0729 06/23/25  2036 06/23/25  1717 06/23/25  1146 06/23/25  0624 06/22/25  2340 06/22/25  1739 06/22/25  1105 06/22/25  0628  06/22/25  0014 06/21/25  2108 06/21/25  1623   POC GLUCOSE mg/dl 130 107 133 113 109 161* 105 126 180* 111 109 111         Results from last 7 days   Lab Units 06/20/25  1144   LACTIC ACID mmol/L 0.5       Recent Cultures (last 7 days):   Results from last 7 days   Lab Units 06/22/25  1748   C DIFF TOXIN B BY PCR  Negative        Last 24 Hours Medication List:     Current Facility-Administered Medications:     albuterol (PROVENTIL HFA,VENTOLIN HFA) inhaler 2 puff, Q4H PRN    aspirin chewable tablet 81 mg, Daily    atorvastatin (LIPITOR) tablet 40 mg, Daily With Dinner    enoxaparin (LOVENOX) subcutaneous injection 40 mg, Q24H JUVENTINO    fluticasone-vilanterol 200-25 mcg/actuation 1 puff, Daily **AND** umeclidinium 62.5 mcg/actuation inhaler AEPB 1 puff, Daily    insulin lispro (HumALOG/ADMELOG) 100 units/mL subcutaneous injection 2-12 Units, TID With Meals **AND** Fingerstick Glucose (POCT), TID AC    levalbuterol (XOPENEX) inhalation solution 1.25 mg, TID    metoprolol (LOPRESSOR) injection 2.5 mg, Q6H PRN    metoprolol succinate (TOPROL-XL) 24 hr tablet 25 mg, HS    morphine injection 2 mg, Q6H PRN    morphine injection 4 mg, Q6H PRN    naloxone (NARCAN) 0.04 mg/mL syringe 0.04 mg, Q1MIN PRN    ondansetron (ZOFRAN) injection 4 mg, Q8H PRN    pantoprazole (PROTONIX) EC tablet 40 mg, Daily    sodium chloride (OCEAN) 0.65 % nasal spray 1 spray, Q1H PRN    sodium chloride 0.9 % infusion, Continuous, Last Rate: 75 mL/hr (06/24/25 0118)    sulfaSALAzine (AZULFIDINE) tablet 500 mg, 4x Daily    traZODone (DESYREL) tablet 100 mg, HS    Administrative Statements   Today, Patient Was Seen By: Cora Santiago MD      **Please Note: This note may have been constructed using a voice recognition system.**

## 2025-06-24 NOTE — ASSESSMENT & PLAN NOTE
Overnight noted to have atrial fibrillation with rapid ventricular response, received multiple dose of IV Lopressor, which resulted in conversion to NSR.  Not on anticoagulation  Cardiology following  Resumed home metoprolol, currently heart rate well-controlled  Sent Eliquis for price check

## 2025-06-24 NOTE — ASSESSMENT & PLAN NOTE
Patient is a 65-year-old male presenting with abdominal discomfort found to be a small bowel obstruction. Past medical history is notable for Crohn's disease with a ileocolectomy with anastomosis and ileostomy in 2018 currently on sulfasalazine (500 mg 4 times a day), history of methamphetamine use, history of tobacco use up until October 2024.  Patient presenting with severe abdominal discomfort, CT imaging concerning for partial obstruction most likely secondary to adhesions of ileocolonic anastomosis.  Patient was evaluated by surgery who determined he did not need any urgent surgical evaluation.    It appears patient has been only on sulfasalazine despite multiple abdominal surgeries for his Crohn's disease.  Patient unsure why he was not given a biologic medication in the past.  It appears that patient's Crohn's symptoms are not well-controlled with the sulfasalazine still endorsing multiple watery bowel movements daily without blood.  Patient is due for a outpatient colonoscopy for further evaluation however this is now his second admission within the month for concerns of small bowel obstruction.  Last colonoscopy was in 2016 however he has not had one recently.      CTE shows concern for new active inflammatory changes of the short segment of the neoterminal ileum as well as the transverse colon.  Patient would benefit from an inpatient colonoscopy for further evaluation with biopsies.    -Patient colonoscopy Thursday  -Clear liquid diet tomorrow  -GoLytely prep for tomorrow  -Continue PTA sulfasalazine for now

## 2025-06-24 NOTE — PLAN OF CARE
Problem: INFECTION - ADULT  Goal: Absence or prevention of progression during hospitalization  Description: INTERVENTIONS:  - Assess and monitor for signs and symptoms of infection  - Monitor lab/diagnostic results  - Monitor all insertion sites, i.e. indwelling lines, tubes, and drains  - Monitor endotracheal if appropriate and nasal secretions for changes in amount and color  - [place] appropriate cooling/warming therapies per order  - Administer medications as ordered  - Instruct and encourage patient and family to use good hand hygiene technique  - Identify and instruct in appropriate isolation precautions for identified infection/condition  Outcome: Progressing  Goal: Absence of fever/infection during neutropenic period  Description: INTERVENTIONS:  - Monitor WBC  - Perform strict hand hygiene  - Limit to healthy visitors only  - No plants, dried, fresh or silk flowers with bae in patient room  Outcome: Progressing     Problem: PAIN - ADULT  Goal: Verbalizes/displays adequate comfort level or baseline comfort level  Description: Interventions:  - Encourage patient to monitor pain and request assistance  - Assess pain using appropriate pain scale  - Administer analgesics as ordered based on type and severity of pain and evaluate response  - Implement non-pharmacological measures as appropriate and evaluate response  - Consider cultural and social influences on pain and pain management  - Notify physician/advanced practitioner if interventions unsuccessful or patient reports new pain  - Educate patient/family on pain management process including their role and importance of  reporting pain   - Provide non-pharmacologic/complimentary pain relief interventions  Outcome: Progressing

## 2025-06-24 NOTE — ASSESSMENT & PLAN NOTE
Patient presents with abdominal pain nausea  CT findings significant for dilated small bowel loops proximal to ileocolonic anastomosis measuring 5cm concerning for partial obstruction  Patient continues with episodes severe abdominal pain and nausea  Denies diarrhea  Negative for C. difficile and stool culture with PCR  Elevated CRP and fecal calprotectin noted  Advance diet as tolerated- currently tolerating  Analgesics, antiemetics, supportive cares  GI following- awaiting decision regarding Inpatient vs. Outpatient colonoscopy.   Monitor closely  Hemoglobin downtrending, hemoglobin 8.9 today, baseline appears to be 10

## 2025-06-24 NOTE — PLAN OF CARE
Problem: PAIN - ADULT  Goal: Verbalizes/displays adequate comfort level or baseline comfort level  Description: Interventions:  - Encourage patient to monitor pain and request assistance  - Assess pain using appropriate pain scale  - Administer analgesics as ordered based on type and severity of pain and evaluate response  - Implement non-pharmacological measures as appropriate and evaluate response  - Consider cultural and social influences on pain and pain management  - Notify physician/advanced practitioner if interventions unsuccessful or patient reports new pain  - Educate patient/family on pain management process including their role and importance of  reporting pain   - Provide non-pharmacologic/complimentary pain relief interventions  Outcome: Progressing     Problem: INFECTION - ADULT  Goal: Absence or prevention of progression during hospitalization  Description: INTERVENTIONS:  - Assess and monitor for signs and symptoms of infection  - Monitor lab/diagnostic results  - Monitor all insertion sites, i.e. indwelling lines, tubes, and drains  - Monitor endotracheal if appropriate and nasal secretions for changes in amount and color  - [place] appropriate cooling/warming therapies per order  - Administer medications as ordered  - Instruct and encourage patient and family to use good hand hygiene technique  - Identify and instruct in appropriate isolation precautions for identified infection/condition  Outcome: Progressing  Goal: Absence of fever/infection during neutropenic period  Description: INTERVENTIONS:  - Monitor WBC  - Perform strict hand hygiene  - Limit to healthy visitors only  - No plants, dried, fresh or silk flowers with bae in patient room  Outcome: Progressing     Problem: SAFETY ADULT  Goal: Patient will remain free of falls  Description: INTERVENTIONS:  - Educate patient/family on patient safety including physical limitations  - Instruct patient to call for assistance with activity   -  Consider consulting OT/PT to assist with strengthening/mobility based on AM PAC & JH-HLM score  - Consult OT/PT to assist with strengthening/mobility   - Keep Call bell within reach  - Keep bed low and locked with side rails adjusted as appropriate  - Keep care items and personal belongings within reach  - Initiate and maintain comfort rounds  - Make Fall Risk Sign visible to staff  - Offer Toileting every  Hours, in advance of need  - Initiate/Maintain alarm  - Obtain necessary fall risk management equipment:   - Apply yellow socks and bracelet for high fall risk patients  - Consider moving patient to room near nurses station  Outcome: Progressing  Goal: Maintain or return to baseline ADL function  Description: INTERVENTIONS:  -  Assess patient's ability to carry out ADLs; assess patient's baseline for ADL function and identify physical deficits which impact ability to perform ADLs (bathing, care of mouth/teeth, toileting, grooming, dressing, etc.)  - Assess/evaluate cause of self-care deficits   - Assess range of motion  - Assess patient's mobility; develop plan if impaired  - Assess patient's need for assistive devices and provide as appropriate  - Encourage maximum independence but intervene and supervise when necessary  - Involve family in performance of ADLs  - Assess for home care needs following discharge   - Consider OT consult to assist with ADL evaluation and planning for discharge  - Provide patient education as appropriate  - Monitor functional capacity and physical performance, use of AM PAC & JH-HLM   - Monitor gait, balance and fatigue with ambulation    Outcome: Progressing  Goal: Maintains/Returns to pre admission functional level  Description: INTERVENTIONS:  - Perform AM-PAC 6 Click Basic Mobility/ Daily Activity assessment daily.  - Set and communicate daily mobility goal to care team and patient/family/caregiver.   - Collaborate with rehabilitation services on mobility goals if consulted  -  Perform Range of Motion  times a day.  - Reposition patient every  hours.  - Dangle patient  times a day  - Stand patient  times a day  - Ambulate patient  times a day  - Out of bed to chair  times a day   - Out of bed for meals  times a day  - Out of bed for toileting  - Record patient progress and toleration of activity level   Outcome: Progressing     Problem: DISCHARGE PLANNING  Goal: Discharge to home or other facility with appropriate resources  Description: INTERVENTIONS:  - Identify barriers to discharge w/patient and caregiver  - Arrange for needed discharge resources and transportation as appropriate  - Identify discharge learning needs (meds, wound care, etc.)  - Arrange for interpretive services to assist at discharge as needed  - Refer to Case Management Department for coordinating discharge planning if the patient needs post-hospital services based on physician/advanced practitioner order or complex needs related to functional status, cognitive ability, or social support system  Outcome: Progressing     Problem: Knowledge Deficit  Goal: Patient/family/caregiver demonstrates understanding of disease process, treatment plan, medications, and discharge instructions  Description: Complete learning assessment and assess knowledge base.  Interventions:  - Provide teaching at level of understanding  - Provide teaching via preferred learning methods  Outcome: Progressing

## 2025-06-24 NOTE — ASSESSMENT & PLAN NOTE
Lab Results   Component Value Date    HGBA1C 6.1 (H) 04/09/2025       Recent Labs     06/23/25  1146 06/23/25  1717 06/23/25 2036 06/24/25  0729   POCGLU 113 133 107 130       Blood Sugar Average: Last 72 hrs:  (P) [id number]

## 2025-06-24 NOTE — ASSESSMENT & PLAN NOTE
Lab Results   Component Value Date    HGBA1C 6.1 (H) 04/09/2025       Recent Labs     06/23/25  1146 06/23/25  1717 06/23/25 2036 06/24/25  0729   POCGLU 113 133 107 130       Blood Sugar Average: Last 72 hrs:  (P) [id number]  At home on metformin 750 mg daily  Diabetes mellitus type 2 controlled  Accu-Cheks reviewed, acceptable  Monitor Accu-Cheks  Avoid hypoglycemia

## 2025-06-24 NOTE — ASSESSMENT & PLAN NOTE
Mathew Bird is a 65 y.o. male with a PMHx significant for ventricular tachycardia, CAD with KERRIE to RCA in 2012, paroxysmal atrial fibrillation, dyslipidemia, and COPD who presents with heart palpitations secondary to paroxysmal atrial fibrillation with RVR. Pt currently denies SOB, chest pain, edema, and palpitations since starting metoprolol. Patient is no longer tachycardic.   In 2018 during another hospitalization for colitis, pt had a similar episode of paroxysmal afib and was placed on amiodarone for a short duration. Due to low CHADS-VASc score of 1, he was not placed on additional anticoagulants.     Plan:  Pt remains in sinus rhythm. Continue home dose of metoprolol 25 mg. Change hold parameter to <100 systolic blood pressure to ensure regular doses are received.   Due to CHADS-VASc of 2, recommend initiating anticoagulation therapy. This was discussed with pt and he is agreeable to this. Will price check eliquis.  May discontinue telemetry at this time  Follow up with outpatient cardiology and obtain echo and nuclear stress test.

## 2025-06-24 NOTE — RESTORATIVE TECHNICIAN NOTE
Restorative Technician Note      Patient Name: Mathew Bird     Restorative Tech Visit Date: 06/24/25  Note Type: Mobility  Patient Position Upon Consult: Supine  Activity Performed: Ambulated  Assistive Device: Roller walker; Other (Comment) (4 L O2)  Patient Position at End of Consult: Supine; All needs within reach    Juan Castaneda, Restorative Technician

## 2025-06-24 NOTE — PROGRESS NOTES
Progress Note - Cardiology   Name: Mathew Bird 65 y.o. male I MRN: [id number]  Unit/Bed#: [facility] 923-01 I Date of Admission: 6/20/2025   Date of Service: 6/24/2025 I Hospital Day: 4    Assessment & Plan  Paroxysmal atrial fibrillation with rapid ventricular response (HCC)  Mathew Bird is a 65 y.o. male with a PMHx significant for ventricular tachycardia, CAD with KERRIE to RCA in 2012, paroxysmal atrial fibrillation, dyslipidemia, and COPD who presents with heart palpitations secondary to paroxysmal atrial fibrillation with RVR. Pt currently denies SOB, chest pain, edema, and palpitations since starting metoprolol. Patient is no longer tachycardic.   In 2018 during another hospitalization for colitis, pt had a similar episode of paroxysmal afib and was placed on amiodarone for a short duration. Due to low CHADS-VASc score of 1, he was not placed on additional anticoagulants.     Plan:  Pt remains in sinus rhythm. Continue home dose of metoprolol 25 mg. Change hold parameter to <100 systolic blood pressure to ensure regular doses are received.   Due to CHADS-VASc of 2, recommend initiating anticoagulation therapy. This was discussed with pt and he is agreeable to this. Will price check eliquis.  May discontinue telemetry at this time  Follow up with outpatient cardiology and obtain echo and nuclear stress test.     SBO (small bowel obstruction) ([facility])  Defer to primary team  Crohn disease ([facility])  Defer to primary team  Coronary artery disease involving native coronary artery of native heart without angina pectoris  Continue aspirin 81 mg, metoprolol 25 mg, and atorvastatin 40 mg  Stage 4 very severe COPD by GOLD classification ([facility])  Defer to primary team  Hypotension  Continue to monitor BP and administer IV fluids.    Type 2 diabetes mellitus, without long-term current use of insulin ([facility])  Lab Results   Component Value Date    HGBA1C 6.1 (H) 04/09/2025       Recent Labs     06/23/25  1146 06/23/25  6897  06/23/25 2036 06/24/25  0729   POCGLU 113 133 107 130       Blood Sugar Average: Last 72 hrs:  (P) [id number]      Subjective   Chief Complaint: Heart palpitations    Mathew Bird was evaluated at bedside this morning. He denies chest pain, heart palpitations, or diaphoresis. He reports that he has not been given regular doses of metoprolol due to low average blood pressures, however he has remained in sinus rhythm on telemetry monitoring.     Objective :  Temp:  [98.7 °F (37.1 °C)-98.8 °F (37.1 °C)] 98.8 °F (37.1 °C)  HR:  [75-98] 75  BP: ()/(46-65) 117/63  Resp:  [14-17] 14  SpO2:  [92 %-97 %] 97 %  O2 Device: Nasal cannula  Nasal Cannula O2 Flow Rate (L/min):  [4 L/min] 4 L/min  Orthostatic Blood Pressures      Flowsheet Row Most Recent Value   Blood Pressure 117/63 filed at 06/24/2025 0736   Patient Position - Orthostatic VS Standing - Orthostatic VS filed at 06/23/2025 1524          First Weight: Weight - Scale: 89.9 kg (198 lb 3.2 oz) (06/21/25 0503)  Vitals:    06/21/25 0503 06/22/25 2309   Weight: 89.9 kg (198 lb 3.2 oz) 88.5 kg (195 lb 1.7 oz)     Physical Exam  Vitals reviewed.   Constitutional:       General: He is not in acute distress.    Cardiovascular:      Rate and Rhythm: Normal rate and regular rhythm.      Pulses:           Radial pulses are 2+ on the right side and 2+ on the left side.        Dorsalis pedis pulses are 2+ on the right side and 2+ on the left side.      Heart sounds: No murmur heard.     No friction rub. No gallop.   Pulmonary:      Effort: Accessory muscle usage present.     Musculoskeletal:      Right lower leg: No edema.      Left lower leg: No edema.     Skin:     General: Skin is warm and dry.      Capillary Refill: Capillary refill takes less than 2 seconds.     Neurological:      Mental Status: He is alert and oriented to person, place, and time.         Lab Results: I have reviewed the following results:  Results from last 7 days   Lab Units 06/23/25  0023  06/22/25  0518 06/21/25  0509   WBC Thousand/uL 7.78 11.02* 11.66*   HEMOGLOBIN g/dL 8.9* 10.1* 10.0*   HEMATOCRIT % 29.0* 33.0* 32.9*   PLATELETS Thousands/uL 155 165 176     Results from last 7 days   Lab Units 06/23/25  0023 06/22/25  0518 06/21/25  0509   POTASSIUM mmol/L 3.6 4.2 4.0   CHLORIDE mmol/L 102 106 105   CO2 mmol/L 24 23 27   BUN mg/dL 16 19 20   CREATININE mg/dL 1.55* 1.47* 1.47*   CALCIUM mg/dL 7.5* 8.1* 8.5         Lab Results   Component Value Date    HGBA1C 6.1 (H) 04/09/2025     Lab Results   Component Value Date    CKTOTAL 825 (H) 02/03/2024    CKMB 9.2 (H) 05/15/2018    CKMBINDEX 3.1 (H) 05/15/2018    TROPONINI <0.02 08/07/2018       Imaging Results Review: No pertinent imaging studies reviewed.  Other Study Results Review: No additional pertinent studies reviewed.    VTE Pharmacologic Prophylaxis: VTE covered by:  enoxaparin, Subcutaneous, 40 mg at 06/24/25 0841

## 2025-06-24 NOTE — PROGRESS NOTES
Progress Note - Gastroenterology   Name: Mathew Bird 65 y.o. male I MRN: [id number]  [facility] I Date of Admission: 6/20/2025   Date of Service: 6/24/2025 I Hospital Day: 4    Assessment & Plan  SBO (small bowel obstruction) (HCC)  Crohn disease (HCC)  Patient is a 65-year-old male presenting with abdominal discomfort found to be a small bowel obstruction. Past medical history is notable for Crohn's disease with a ileocolectomy with anastomosis and ileostomy in 2018 currently on sulfasalazine (500 mg 4 times a day), history of methamphetamine use, history of tobacco use up until October 2024.  Patient presenting with severe abdominal discomfort, CT imaging concerning for partial obstruction most likely secondary to adhesions of ileocolonic anastomosis.  Patient was evaluated by surgery who determined he did not need any urgent surgical evaluation.    It appears patient has been only on sulfasalazine despite multiple abdominal surgeries for his Crohn's disease.  Patient unsure why he was not given a biologic medication in the past.  It appears that patient's Crohn's symptoms are not well-controlled with the sulfasalazine still endorsing multiple watery bowel movements daily without blood.  Patient is due for a outpatient colonoscopy for further evaluation however this is now his second admission within the month for concerns of small bowel obstruction.  Last colonoscopy was in 2016 however he has not had one recently.      CTE shows concern for new active inflammatory changes of the short segment of the neoterminal ileum as well as the transverse colon.  Patient would benefit from an inpatient colonoscopy for further evaluation with biopsies.    -Patient colonoscopy Thursday  -Clear liquid diet tomorrow  -GoLytely prep for tomorrow  -Continue PTA sulfasalazine for now        Subjective   Patient was seen and examined at bedside.  Patient continuing to endorse abdominal discomfort    Objective  :  Temp:  [98.7 °F (37.1 °C)-98.8 °F (37.1 °C)] 98.7 °F (37.1 °C)  HR:  [75-89] 76  BP: (104-134)/(47-66) 134/66  Resp:  [14-18] 18  SpO2:  [94 %-97 %] 94 %  O2 Device: Nasal cannula  Nasal Cannula O2 Flow Rate (L/min):  [4 L/min-4.5 L/min] 4.5 L/min    Physical Exam  Pulmonary:      Effort: Pulmonary effort is normal.   Abdominal:      General: Abdomen is flat.      Palpations: Abdomen is soft.      Tenderness: There is abdominal tenderness.     Skin:     General: Skin is warm and dry.     Neurological:      Mental Status: He is alert.     Psychiatric:         Mood and Affect: Mood normal.           Lab Results: I have reviewed the following results:

## 2025-06-25 VITALS
RESPIRATION RATE: 14 BRPM | TEMPERATURE: 97.5 F | WEIGHT: 195.11 LBS | DIASTOLIC BLOOD PRESSURE: 64 MMHG | OXYGEN SATURATION: 98 % | HEART RATE: 76 BPM | HEIGHT: 66 IN | SYSTOLIC BLOOD PRESSURE: 125 MMHG | BODY MASS INDEX: 31.36 KG/M2

## 2025-06-25 LAB
ANION GAP SERPL CALCULATED.3IONS-SCNC: 6 MMOL/L (ref 4–13)
BUN SERPL-MCNC: 11 MG/DL (ref 5–25)
CALCIUM SERPL-MCNC: 8.4 MG/DL (ref 8.4–10.2)
CHLORIDE SERPL-SCNC: 106 MMOL/L (ref 96–108)
CO2 SERPL-SCNC: 29 MMOL/L (ref 21–32)
CREAT SERPL-MCNC: 1.59 MG/DL (ref 0.6–1.3)
ERYTHROCYTE [DISTWIDTH] IN BLOOD BY AUTOMATED COUNT: 15.4 % (ref 11.6–15.1)
GFR SERPL CREATININE-BSD FRML MDRD: 45 ML/MIN/1.73SQ M
GLUCOSE SERPL-MCNC: 151 MG/DL (ref 65–140)
GLUCOSE SERPL-MCNC: 162 MG/DL (ref 65–140)
GLUCOSE SERPL-MCNC: 164 MG/DL (ref 65–140)
HBV CORE AB SER QL: NORMAL
HBV SURFACE AB SER-ACNC: <3 MIU/ML
HBV SURFACE AG SER QL: NORMAL
HCT VFR BLD AUTO: 27.2 % (ref 36.5–49.3)
HCV AB SER QL: NORMAL
HGB BLD-MCNC: 8.1 G/DL (ref 12–17)
MCH RBC QN AUTO: 25.6 PG (ref 26.8–34.3)
MCHC RBC AUTO-ENTMCNC: 29.8 G/DL (ref 31.4–37.4)
MCV RBC AUTO: 86 FL (ref 82–98)
PLATELET # BLD AUTO: 159 THOUSANDS/UL (ref 149–390)
PMV BLD AUTO: 10.6 FL (ref 8.9–12.7)
POTASSIUM SERPL-SCNC: 4.8 MMOL/L (ref 3.5–5.3)
RBC # BLD AUTO: 3.17 MILLION/UL (ref 3.88–5.62)
SODIUM SERPL-SCNC: 141 MMOL/L (ref 135–147)
WBC # BLD AUTO: 5.93 THOUSAND/UL (ref 4.31–10.16)

## 2025-06-25 PROCEDURE — 86803 HEPATITIS C AB TEST: CPT | Performed by: FAMILY MEDICINE

## 2025-06-25 PROCEDURE — 82948 REAGENT STRIP/BLOOD GLUCOSE: CPT

## 2025-06-25 PROCEDURE — 85027 COMPLETE CBC AUTOMATED: CPT | Performed by: FAMILY MEDICINE

## 2025-06-25 PROCEDURE — 87340 HEPATITIS B SURFACE AG IA: CPT | Performed by: FAMILY MEDICINE

## 2025-06-25 PROCEDURE — 86706 HEP B SURFACE ANTIBODY: CPT | Performed by: FAMILY MEDICINE

## 2025-06-25 PROCEDURE — 99232 SBSQ HOSP IP/OBS MODERATE 35: CPT | Performed by: INTERNAL MEDICINE

## 2025-06-25 PROCEDURE — 94760 N-INVAS EAR/PLS OXIMETRY 1: CPT

## 2025-06-25 PROCEDURE — 99239 HOSP IP/OBS DSCHRG MGMT >30: CPT | Performed by: FAMILY MEDICINE

## 2025-06-25 PROCEDURE — 86480 TB TEST CELL IMMUN MEASURE: CPT | Performed by: FAMILY MEDICINE

## 2025-06-25 PROCEDURE — 94664 DEMO&/EVAL PT USE INHALER: CPT

## 2025-06-25 PROCEDURE — 80048 BASIC METABOLIC PNL TOTAL CA: CPT | Performed by: FAMILY MEDICINE

## 2025-06-25 PROCEDURE — 86704 HEP B CORE ANTIBODY TOTAL: CPT | Performed by: FAMILY MEDICINE

## 2025-06-25 PROCEDURE — 94640 AIRWAY INHALATION TREATMENT: CPT

## 2025-06-25 RX ORDER — PREDNISONE 20 MG/1
20 TABLET ORAL DAILY
Status: DISCONTINUED | OUTPATIENT
Start: 2025-07-10 | End: 2025-06-25 | Stop reason: HOSPADM

## 2025-06-25 RX ORDER — PREDNISONE 20 MG/1
40 TABLET ORAL DAILY
Status: DISCONTINUED | OUTPATIENT
Start: 2025-06-26 | End: 2025-06-25 | Stop reason: HOSPADM

## 2025-06-25 RX ORDER — PREDNISONE 10 MG/1
10 TABLET ORAL DAILY
Status: DISCONTINUED | OUTPATIENT
Start: 2025-07-17 | End: 2025-06-25 | Stop reason: HOSPADM

## 2025-06-25 RX ORDER — PREDNISONE 10 MG/1
TABLET ORAL
Qty: 70 TABLET | Refills: 0 | Status: SHIPPED | OUTPATIENT
Start: 2025-06-26 | End: 2025-07-24

## 2025-06-25 RX ORDER — OXYCODONE HYDROCHLORIDE 5 MG/1
5 TABLET ORAL EVERY 4 HOURS PRN
Qty: 20 TABLET | Refills: 0 | Status: SHIPPED | OUTPATIENT
Start: 2025-06-25 | End: 2025-07-05

## 2025-06-25 RX ORDER — SODIUM CHLORIDE, SODIUM GLUCONATE, SODIUM ACETATE, POTASSIUM CHLORIDE, MAGNESIUM CHLORIDE, SODIUM PHOSPHATE, DIBASIC, AND POTASSIUM PHOSPHATE .53; .5; .37; .037; .03; .012; .00082 G/100ML; G/100ML; G/100ML; G/100ML; G/100ML; G/100ML; G/100ML
1000 INJECTION, SOLUTION INTRAVENOUS ONCE
Status: COMPLETED | OUTPATIENT
Start: 2025-06-25 | End: 2025-06-25

## 2025-06-25 RX ADMIN — ASPIRIN 81 MG CHEWABLE TABLET 81 MG: 81 TABLET CHEWABLE at 08:29

## 2025-06-25 RX ADMIN — UMECLIDINIUM 1 PUFF: 62.5 AEROSOL, POWDER ORAL at 08:30

## 2025-06-25 RX ADMIN — SULFASALAZINE 500 MG: 500 TABLET ORAL at 08:29

## 2025-06-25 RX ADMIN — MORPHINE SULFATE 4 MG: 4 INJECTION INTRAVENOUS at 06:19

## 2025-06-25 RX ADMIN — METHYLPREDNISOLONE SODIUM SUCCINATE 20 MG: 40 INJECTION, POWDER, FOR SOLUTION INTRAMUSCULAR; INTRAVENOUS at 13:01

## 2025-06-25 RX ADMIN — METHYLPREDNISOLONE SODIUM SUCCINATE 20 MG: 40 INJECTION, POWDER, FOR SOLUTION INTRAMUSCULAR; INTRAVENOUS at 05:05

## 2025-06-25 RX ADMIN — INSULIN LISPRO 2 UNITS: 100 INJECTION, SOLUTION INTRAVENOUS; SUBCUTANEOUS at 12:27

## 2025-06-25 RX ADMIN — PANTOPRAZOLE SODIUM 40 MG: 40 TABLET, DELAYED RELEASE ORAL at 08:29

## 2025-06-25 RX ADMIN — SULFASALAZINE 500 MG: 500 TABLET ORAL at 12:27

## 2025-06-25 RX ADMIN — INSULIN LISPRO 2 UNITS: 100 INJECTION, SOLUTION INTRAVENOUS; SUBCUTANEOUS at 08:29

## 2025-06-25 RX ADMIN — LEVALBUTEROL HYDROCHLORIDE 1.25 MG: 1.25 SOLUTION RESPIRATORY (INHALATION) at 14:11

## 2025-06-25 RX ADMIN — SODIUM CHLORIDE, SODIUM GLUCONATE, SODIUM ACETATE, POTASSIUM CHLORIDE, MAGNESIUM CHLORIDE, SODIUM PHOSPHATE, DIBASIC, AND POTASSIUM PHOSPHATE 1000 ML: .53; .5; .37; .037; .03; .012; .00082 INJECTION, SOLUTION INTRAVENOUS at 09:28

## 2025-06-25 RX ADMIN — MORPHINE SULFATE 4 MG: 4 INJECTION INTRAVENOUS at 12:58

## 2025-06-25 RX ADMIN — ENOXAPARIN SODIUM 40 MG: 40 INJECTION SUBCUTANEOUS at 08:30

## 2025-06-25 RX ADMIN — LEVALBUTEROL HYDROCHLORIDE 1.25 MG: 1.25 SOLUTION RESPIRATORY (INHALATION) at 07:29

## 2025-06-25 RX ADMIN — FLUTICASONE FUROATE AND VILANTEROL TRIFENATATE 1 PUFF: 200; 25 POWDER RESPIRATORY (INHALATION) at 08:30

## 2025-06-25 NOTE — ASSESSMENT & PLAN NOTE
Patient is a 65-year-old male presenting with abdominal discomfort found to be a small bowel obstruction. Past medical history is notable for Crohn's disease with a ileocolectomy with anastomosis and ileostomy in 2018 currently on sulfasalazine (500 mg 4 times a day), history of methamphetamine use, history of tobacco use up until October 2024.  Patient presenting with severe abdominal discomfort, CT imaging concerning for partial obstruction most likely secondary to adhesions of ileocolonic anastomosis.  Patient was evaluated by surgery who determined he did not need any urgent surgical evaluation.    It appears patient has been only on sulfasalazine despite multiple abdominal surgeries for his Crohn's disease.  Patient unsure why he was not given a biologic medication in the past.  It appears that patient's Crohn's symptoms are not well-controlled with the sulfasalazine still endorsing multiple watery bowel movements daily without blood.  Patient is due for a outpatient colonoscopy for further evaluation however this is now his second admission within the month for concerns of small bowel obstruction.  Last colonoscopy was in 2016 however he has not had one recently.      CTE shows concern for new active inflammatory changes of the short segment of the neoterminal ileum as well as the transverse colon.  Discussed with the patient regarding inpatient colonoscopy as we believe it would be helpful in further diagnosing and managing his Crohn disease however at this time patient is adamant about going home and would only like to be seen outpatient.  Will set up outpatient EGD and colonoscopy for the patient.  In the meantime recommend prednisone on discharge starting with 40 mg daily and tapering downwards.  Will need to follow-up with the GI IBD clinic outpatient soon for further management.    - EGD and colonoscopy outpatient for further evaluation of Crohn's disease  -Will need close follow-up with IBD  clinic  -Discharged with prednisone 40 for a week and a 10 mg taper weekly (40 mg for 10 day, 30 mg for 10 days, 20 mg for 10 days.... etc)  -Continue PTA sulfasalazine for now

## 2025-06-25 NOTE — ASSESSMENT & PLAN NOTE
Patient presents with abdominal pain nausea  CT findings significant for dilated small bowel loops proximal to ileocolonic anastomosis measuring 5cm concerning for partial obstruction  Patient continues with episodes severe abdominal pain and nausea  Denies diarrhea  Negative for C. difficile and stool culture with PCR  Elevated CRP and fecal calprotectin noted  Advance diet as tolerated- currently tolerating  Analgesics, antiemetics, supportive cares  Gastroenterology consulted, recommended colonoscopy inpatient.  Patient currently declining inpatient colonoscopy, requesting to be discharged home and follow-up outpatient

## 2025-06-25 NOTE — PLAN OF CARE
Problem: PAIN - ADULT  Goal: Verbalizes/displays adequate comfort level or baseline comfort level  Description: Interventions:  - Encourage patient to monitor pain and request assistance  - Assess pain using appropriate pain scale  - Administer analgesics as ordered based on type and severity of pain and evaluate response  - Implement non-pharmacological measures as appropriate and evaluate response  - Consider cultural and social influences on pain and pain management  - Notify physician/advanced practitioner if interventions unsuccessful or patient reports new pain  - Educate patient/family on pain management process including their role and importance of  reporting pain   - Provide non-pharmacologic/complimentary pain relief interventions  Outcome: Progressing     Problem: INFECTION - ADULT  Goal: Absence or prevention of progression during hospitalization  Description: INTERVENTIONS:  - Assess and monitor for signs and symptoms of infection  - Monitor lab/diagnostic results  - Monitor all insertion sites, i.e. indwelling lines, tubes, and drains  - Monitor endotracheal if appropriate and nasal secretions for changes in amount and color  - [place] appropriate cooling/warming therapies per order  - Administer medications as ordered  - Instruct and encourage patient and family to use good hand hygiene technique  - Identify and instruct in appropriate isolation precautions for identified infection/condition  Outcome: Progressing  Goal: Absence of fever/infection during neutropenic period  Description: INTERVENTIONS:  - Monitor WBC  - Perform strict hand hygiene  - Limit to healthy visitors only  - No plants, dried, fresh or silk flowers with bae in patient room  Outcome: Progressing

## 2025-06-25 NOTE — PROGRESS NOTES
Progress Note - Gastroenterology   Name: Mathew Bird 65 y.o. male I MRN: [id number]  [facility] I Date of Admission: 6/20/2025   Date of Service: 6/25/2025 I Hospital Day: 5    Assessment & Plan  SBO (small bowel obstruction) (HCC)  Crohn disease (HCC)  Patient is a 65-year-old male presenting with abdominal discomfort found to be a small bowel obstruction. Past medical history is notable for Crohn's disease with a ileocolectomy with anastomosis and ileostomy in 2018 currently on sulfasalazine (500 mg 4 times a day), history of methamphetamine use, history of tobacco use up until October 2024.  Patient presenting with severe abdominal discomfort, CT imaging concerning for partial obstruction most likely secondary to adhesions of ileocolonic anastomosis.  Patient was evaluated by surgery who determined he did not need any urgent surgical evaluation.    It appears patient has been only on sulfasalazine despite multiple abdominal surgeries for his Crohn's disease.  Patient unsure why he was not given a biologic medication in the past.  It appears that patient's Crohn's symptoms are not well-controlled with the sulfasalazine still endorsing multiple watery bowel movements daily without blood.  Patient is due for a outpatient colonoscopy for further evaluation however this is now his second admission within the month for concerns of small bowel obstruction.  Last colonoscopy was in 2016 however he has not had one recently.      CTE shows concern for new active inflammatory changes of the short segment of the neoterminal ileum as well as the transverse colon.  Discussed with the patient regarding inpatient colonoscopy as we believe it would be helpful in further diagnosing and managing his Crohn disease however at this time patient is adamant about going home and would only like to be seen outpatient.  Will set up outpatient EGD and colonoscopy for the patient.  In the meantime recommend prednisone on  discharge starting with 40 mg daily and tapering downwards.  Will need to follow-up with the GI IBD clinic outpatient soon for further management.    - EGD and colonoscopy outpatient for further evaluation of Crohn's disease  -Will need close follow-up with IBD clinic  -Discharged with prednisone 40 for a week and a 10 mg taper weekly (40 mg for 10 day, 30 mg for 10 days, 20 mg for 10 days.... etc)  -Continue PTA sulfasalazine for now        Subjective   Patient was seen and examined at bedside.  He appears to be frustrated that he has to stay in the hospital for a colonoscopy states that his pain is better and he is tolerating a diet.    Objective :  Temp:  [97.5 °F (36.4 °C)-98.7 °F (37.1 °C)] 97.5 °F (36.4 °C)  HR:  [71-89] 76  BP: (104-134)/(54-66) 125/64  Resp:  [14-19] 14  SpO2:  [94 %-98 %] 97 %  O2 Device: Nasal cannula  Nasal Cannula O2 Flow Rate (L/min):  [4.5 L/min] 4.5 L/min    Physical Exam  Pulmonary:      Effort: Pulmonary effort is normal.   Abdominal:      General: Abdomen is flat.      Palpations: Abdomen is soft.      Tenderness: There is abdominal tenderness.      Comments: Abdominal distention notable hernia seen on abdomen as well as multiple old surgical scars.     Skin:     General: Skin is warm and dry.     Neurological:      Mental Status: He is alert.     Psychiatric:         Mood and Affect: Mood normal.           Lab Results: I have reviewed the following results:

## 2025-06-25 NOTE — PLAN OF CARE
Problem: PAIN - ADULT  Goal: Verbalizes/displays adequate comfort level or baseline comfort level  Description: Interventions:  - Encourage patient to monitor pain and request assistance  - Assess pain using appropriate pain scale  - Administer analgesics as ordered based on type and severity of pain and evaluate response  - Implement non-pharmacological measures as appropriate and evaluate response  - Consider cultural and social influences on pain and pain management  - Notify physician/advanced practitioner if interventions unsuccessful or patient reports new pain  - Educate patient/family on pain management process including their role and importance of  reporting pain   - Provide non-pharmacologic/complimentary pain relief interventions  Outcome: Progressing     Problem: INFECTION - ADULT  Goal: Absence or prevention of progression during hospitalization  Description: INTERVENTIONS:  - Assess and monitor for signs and symptoms of infection  - Monitor lab/diagnostic results  - Monitor all insertion sites, i.e. indwelling lines, tubes, and drains  - Monitor endotracheal if appropriate and nasal secretions for changes in amount and color  - [place] appropriate cooling/warming therapies per order  - Administer medications as ordered  - Instruct and encourage patient and family to use good hand hygiene technique  - Identify and instruct in appropriate isolation precautions for identified infection/condition  Outcome: Progressing  Goal: Absence of fever/infection during neutropenic period  Description: INTERVENTIONS:  - Monitor WBC  - Perform strict hand hygiene  - Limit to healthy visitors only  - No plants, dried, fresh or silk flowers with bae in patient room  Outcome: Progressing

## 2025-06-25 NOTE — ASSESSMENT & PLAN NOTE
Noted uptrending creatinine.  Will administer additional IV fluid today.  Follow-up with blood work in 1 week  Follow-up with PCP

## 2025-06-25 NOTE — DISCHARGE SUMMARY
Discharge Summary - Hospitalist   Name: Mathew Bird 65 y.o. male I MRN: [id number]  [facility] I Date of Admission: 6/20/2025   Date of Service: 6/25/2025 I Hospital Day: 5     Assessment & Plan  SBO (small bowel obstruction) (HCC)  Patient presents with abdominal pain nausea  CT findings significant for dilated small bowel loops proximal to ileocolonic anastomosis measuring 5cm concerning for partial obstruction  Patient continues with episodes severe abdominal pain and nausea  Denies diarrhea  Negative for C. difficile and stool culture with PCR  Elevated CRP and fecal calprotectin noted  Advance diet as tolerated- currently tolerating  Analgesics, antiemetics, supportive cares  Gastroenterology consulted, recommended colonoscopy inpatient.  Patient currently declining inpatient colonoscopy, requesting to be discharged home and follow-up outpatient  Paroxysmal atrial fibrillation with rapid ventricular response (HCC)  Overnight noted to have atrial fibrillation with rapid ventricular response, received multiple dose of IV Lopressor, which resulted in conversion to NSR.  Not on anticoagulation  Cardiology following  Resumed home metoprolol, currently heart rate well-controlled  Discussed with gastroenterology for initiation of Eliquis.  However they are recommending against Eliquis until outpatient colonoscopy is completed.  Placed referral for cardiology  Hypotension  Patient with hypotension  Remains asymptomatic  Monitor orthostatics  Improved with IV fluid  Coronary artery disease involving native coronary artery of native heart without angina pectoris  Continue aspirin, metoprolol, statin  Probably discontinue aspirin once started on Eliquis  Crohn disease (HCC)  s/p ilela colectomy with anastomosis and ileostomy which has been reversed October 2018   Continue sulfasalazine  GI following  Patient has been treated with IV Solu-Medrol.  Discharged with tapering dose of prednisone  Stage 4 very  severe COPD by GOLD classification ([facility])  Continue Eyad Rhodes  Type 2 diabetes mellitus, without long-term current use of insulin ([facility])  Lab Results   Component Value Date    HGBA1C 6.1 (H) 04/09/2025       Recent Labs     06/24/25  1138 06/24/25  1627 06/24/25  2036 06/25/25  0725   POCGLU 158* 132 128 162*       Blood Sugar Average: Last 72 hrs:  (P) 132.5  At home on metformin 750 mg daily  Diabetes mellitus type 2 controlled  Accu-Cheks reviewed, acceptable  Monitor Accu-Cheks  Avoid hypoglycemia  Elevated serum creatinine  Noted uptrending creatinine.  Will administer additional IV fluid today.  Follow-up with blood work in 1 week  Follow-up with PCP     Discharging Physician / Practitioner: Cora Santiago MD  PCP: Mariola Doyle MD  Admission Date:   Admission Orders (From admission, onward)       Ordered        06/20/25 1556  INPATIENT ADMISSION  Once                          Discharge Date: 06/25/25    Medical Problems       Resolved Problems  Date Reviewed: 6/20/2025   None         Consultations During Hospital Stay:  Cardiology  Gastroenterology    Procedures Performed:   None    Significant Findings / Test Results:   Narrative & Impression   CT ABDOMEN AND PELVIS WITH IV CONTRAST- ENTEROGRAPHY - WITH CONTRAST     INDICATION: hx of crohns, 2 admissions for SBO in 1 month, check for new stricturing.     COMPARISON: CT abdomen/pelvis 6/20/2025, 11/1/2024.     TECHNIQUE: Contrast-enhanced CT examination of the abdomen and pelvis was performed utilizing thin section technique and after the administration of low density enteric contrast according to protocol designed specifically to obtain sensitive evaluation   of the small bowel. Multiplanar 2D reformatted images were created from the source data.     Radiation dose length product (DLP) for this visit: 837.52 mGy-cm. . This examination, like all CT scans performed in the [facility], was performed utilizing techniques to minimize radiation  dose exposure, including the use of iterative   reconstruction and automated exposure control.     IV Contrast: 100 mL of iodixanol (VISIPAQUE)     Enteric Contrast: 1350 cc of low density enteric contrast (Breeza) was administered.     FINDINGS:     ABDOMEN     ENTEROGRAPHY:  - Small bowel distension: Adequate.  - Bowel: Prior right hemicolectomy with ileocolonic anastomosis. A small bowel-small bowel anastomosis in the mid ileum in the anterior abdomen is widely patent.     Again seen are multiple loops of distal small bowel with air-fluid levels, diffusely dilated to 4 cm (previously 5 cm) with abrupt transition to a short (2-3 cm) segment of neoterminal ileum which is collapsed and mildly thickened with hyperemia (series   301/139, 601/43). This segment was normally distended and nonthickened on recent exam from 2 days prior, consistent with mild active inflammation rather than chronic stricturing. The ileocolonic anastomosis is widely patent.     The most proximal large bowel is normal in appearance, however the remainder of the transverse colon is moderately thickened and hyperemic with surrounding pericolonic fat stranding; this is new from 2 days prior. The descending and sigmoid colon are   within normal limits.  No fistula, sinus tract, or abscess in the abdomen or pelvis.  - Mesenteric findings: Perienteric edema and/or inflammation.  - Extraintestinal findings: Cholelithiasis.     REMAINDER OF THE ABDOMEN AND PELVIS:     LOWER CHEST: Stable reticulonodular opacities in the right lower lobe and left lower lobe with scattered impacted bronchi. Stable 3 mm nodule in the lateral right lower lobe (301/17). Scattered linear atelectasis/scarring. Findings are unchanged since at   least 11/1/2024.     LIVER/BILIARY TREE: Unremarkable.     GALLBLADDER: Cholelithiasis without findings of acute cholecystitis.     SPLEEN: Unremarkable.     PANCREAS: Unremarkable.     ADRENAL GLANDS: Unremarkable.      KIDNEYS/URETERS:  No hydronephrosis. Bilateral simple cysts as well as a left upper pole 2.8 cm cyst with small amount of eccentric calcification. Punctate parenchymal calcification in the lower pole of the right kidney. No suspicious lesions.     APPENDIX: Surgically absent.     ABDOMINOPELVIC CAVITY: Mild mesenteric edema with trace fluid stranding in the paracolic gutters. No pneumoperitoneum. No lymphadenopathy.     VESSELS: Unremarkable for patient's age.     PELVIS     REPRODUCTIVE ORGANS: Mildly enlarged prostate with coarse calcifications. Symmetric seminal vesicles.     URINARY BLADDER: Chronic mild circumferential bladder wall thickening. No perivesical inflammatory stranding. No calculi. No focal thickening.     CHEST:     ABDOMINAL WALL/INGUINAL REGIONS: Postsurgical changes in the anterior abdominal wall with eventration containing a nonobstructed portion of transverse colon.     BONES: No acute fracture or suspicious osseous lesion. Grade 1 degenerative retrolisthesis of L3 on L4. Minimal lumbar levocurvature.     IMPRESSION:     New active inflammatory changes of a short segment of the neoterminal ileum as well as the majority of the transverse colon. There is persistent but improved small bowel dilation reflecting a degree of relative obstruction and/or ileus, however there is   no definite stricturing (noting that the neoterminal ileum was open and normal in appearance 2 days previously).             Reason for Admission: Abdominal pain    Hospital Course:     Mathew Bird is a 65 y.o. male patient who originally presented to the hospital on 6/20/2025 Mathew Bird, who was hospitalized from 6/20/2025 through 6/25/2025, originally presented with abdominal pain.  Patient has history of Crohn's disease s/p ileal colectomy and anastomosis, ileostomy which has been reversed in 2018.  CT abdomen and pelvis completed with the admitting diagnosis of  partial small bowel obstruction secondary to  "active Crohn's disease.  General surgery was consulted, did not recommend acute surgical intervention.  Patient is treated with conservatively and subsequently patient had bowel movement and passed flatus.  Diet has been advanced.  Noted to have elevated calprotectin and CRP.  GI was consulted with concern of Crohn's flareup, patient treated with IV steroids with symptomatic improvement.  Gastroenterology recommended inpatient colonoscopy.  However patient reports that he would prefer to go home and completed outpatient basis.    During hospitalization, noted to have A-fib with RVR which converted back to NSR with IV Lopressor.  This was thought to be due to missing multiple doses of home metoprolol.    Cardiologist recommended anticoagulation on discharge however gastroenterology recommended to wait until outpatient colonoscopy.  Since patient is currently in sinus rhythm, okay to discharge patient without blood thinner.  Placed referral for cardiology.  Noted elevated creatinine, administered IV fluid bolus.  Recommended patient to repeat blood work CBC and BMP in 1 week and follow-up with PCP.  Patient has very good oral intake currently.    Please see above list of diagnoses and related plan for additional information.     Condition at Discharge: good     Discharge Day Visit / Exam:     Subjective: Patient is sitting up on the bed, requesting to be discharged home.  Denies any blood in the stool.  Did have 3 episodes of loose stool however no blood noted.  Vitals: Blood Pressure: 125/64 (06/25/25 0729)  Pulse: 76 (06/25/25 0729)  Temperature: 97.5 °F (36.4 °C) (06/25/25 0729)  Temp Source: Oral (06/25/25 0047)  Respirations: 14 (06/25/25 0729)  Height: 5' 6\" (167.6 cm) (06/22/25 2309)  Weight - Scale: 88.5 kg (195 lb 1.7 oz) (06/22/25 2309)  SpO2: 97 % (06/25/25 0731)  Exam:   Physical Exam  Vitals and nursing note reviewed.   Constitutional:       General: He is not in acute distress.     Appearance: Normal " appearance.   HENT:      Head: Normocephalic and atraumatic.      Right Ear: External ear normal.      Left Ear: External ear normal.      Nose: Nose normal.     Eyes:      Extraocular Movements: Extraocular movements intact.     Pulmonary:      Effort: Pulmonary effort is normal.     Musculoskeletal:      Cervical back: Normal range of motion.     Neurological:      Mental Status: He is alert. Mental status is at baseline.     Psychiatric:         Mood and Affect: Mood normal.         Discharge instructions/Information to patient and family:   See after visit summary for information provided to patient and family.      Provisions for Follow-Up Care:  See after visit summary for information related to follow-up care and any pertinent home health orders.      Disposition:     Home       Planned Readmission: no     Discharge Statement:  I spent 45 minutes discharging the patient. This time was spent on the day of discharge. I had direct contact with the patient on the day of discharge. Greater than 50% of the total time was spent examining patient, answering all patient questions, arranging and discussing plan of care with patient as well as directly providing post-discharge instructions.  Additional time then spent on discharge activities.    Discharge Medications:  See after visit summary for reconciled discharge medications provided to patient and family.      ** Please Note: This note has been constructed using a voice recognition system **

## 2025-06-25 NOTE — ASSESSMENT & PLAN NOTE
s/p ilela colectomy with anastomosis and ileostomy which has been reversed October 2018   Continue sulfasalazine  GI following  Patient has been treated with IV Solu-Medrol.  Discharged with tapering dose of prednisone

## 2025-06-25 NOTE — PLAN OF CARE
Problem: PAIN - ADULT  Goal: Verbalizes/displays adequate comfort level or baseline comfort level  Description: Interventions:  - Encourage patient to monitor pain and request assistance  - Assess pain using appropriate pain scale  - Administer analgesics as ordered based on type and severity of pain and evaluate response  - Implement non-pharmacological measures as appropriate and evaluate response  - Consider cultural and social influences on pain and pain management  - Notify physician/advanced practitioner if interventions unsuccessful or patient reports new pain  - Educate patient/family on pain management process including their role and importance of  reporting pain   - Provide non-pharmacologic/complimentary pain relief interventions  6/25/2025 1232 by Peri Fierro, RN  Outcome: Adequate for Discharge  6/25/2025 1232 by Peri Fierro, RN  Outcome: Progressing

## 2025-06-25 NOTE — ASSESSMENT & PLAN NOTE
Overnight noted to have atrial fibrillation with rapid ventricular response, received multiple dose of IV Lopressor, which resulted in conversion to NSR.  Not on anticoagulation  Cardiology following  Resumed home metoprolol, currently heart rate well-controlled  Discussed with gastroenterology for initiation of Eliquis.  However they are recommending against Eliquis until outpatient colonoscopy is completed.  Placed referral for cardiology

## 2025-06-25 NOTE — ASSESSMENT & PLAN NOTE
Lab Results   Component Value Date    HGBA1C 6.1 (H) 04/09/2025       Recent Labs     06/24/25  1138 06/24/25  1627 06/24/25 2036 06/25/25  0725   POCGLU 158* 132 128 162*       Blood Sugar Average: Last 72 hrs:  (P) 132.5  At home on metformin 750 mg daily  Diabetes mellitus type 2 controlled  Accu-Cheks reviewed, acceptable  Monitor Accu-Cheks  Avoid hypoglycemia

## 2025-06-26 ENCOUNTER — TELEPHONE (OUTPATIENT)
Age: 65
End: 2025-06-26

## 2025-06-26 ENCOUNTER — TELEPHONE (OUTPATIENT)
Dept: CARDIOLOGY CLINIC | Facility: CLINIC | Age: 65
End: 2025-06-26

## 2025-06-26 ENCOUNTER — TRANSITIONAL CARE MANAGEMENT (OUTPATIENT)
Dept: FAMILY MEDICINE CLINIC | Facility: CLINIC | Age: 65
End: 2025-06-26

## 2025-06-26 ENCOUNTER — PATIENT OUTREACH (OUTPATIENT)
Dept: CASE MANAGEMENT | Facility: OTHER | Age: 65
End: 2025-06-26

## 2025-06-26 ENCOUNTER — TELEPHONE (OUTPATIENT)
Dept: FAMILY MEDICINE CLINIC | Facility: CLINIC | Age: 65
End: 2025-06-26

## 2025-06-26 DIAGNOSIS — I48.0 PAROXYSMAL ATRIAL FIBRILLATION (HCC): ICD-10-CM

## 2025-06-26 DIAGNOSIS — I47.20 VENTRICULAR TACHYCARDIA (HCC): Primary | ICD-10-CM

## 2025-06-26 LAB
GAMMA INTERFERON BACKGROUND BLD IA-ACNC: 0.02 IU/ML
M TB IFN-G BLD-IMP: ABNORMAL
M TB IFN-G CD4+ BCKGRND COR BLD-ACNC: 0 IU/ML
M TB IFN-G CD4+ BCKGRND COR BLD-ACNC: 0 IU/ML
MITOGEN IGNF BCKGRD COR BLD-ACNC: <0.1 IU/ML

## 2025-06-26 NOTE — TELEPHONE ENCOUNTER
Patient called to schedule F/U after discharge.  He wanted an appt for he and his daughter.  Because of daughter's symptoms, she was transferred to nurse for triage.  So the patient will need to be called back to set up his TCM.  Thank you.

## 2025-06-26 NOTE — UTILIZATION REVIEW
NOTIFICATION OF ADMISSION DISCHARGE   This is a Notification of Discharge from [facility]. Please be advised that this patient has been discharge from our facility. Below you will find the admission and discharge date and time including the patient’s disposition.   UTILIZATION REVIEW CONTACT:  Utilization Review Assistants  Network Utilization Review Department  Phone: [phone] x carefully listen to the prompts. All voicemails are confidential.  Email: [email or web address]     ADMISSION INFORMATION  PRESENTATION DATE: 6/20/2025  8:40 AM  OBERVATION ADMISSION DATE: N/A  INPATIENT ADMISSION DATE: 6/20/25  3:57 PM   DISCHARGE DATE: 6/25/2025  3:34 PM   DISPOSITION:Home/Self Care    Network Utilization Review Department  ATTENTION: Please call with any questions or concerns to [phone] and carefully listen to the prompts so that you are directed to the right person. All voicemails are confidential.   For Discharge needs, contact Care Management DC Support Team at [phone] opt. 2  Send all requests for admission clinical reviews, approved or denied determinations and any other requests to dedicated fax number below belonging to the campus where the patient is receiving treatment. List of dedicated fax numbers for the Facilities:  FACILITY NAME UR FAX NUMBER   ADMISSION DENIALS (Administrative/Medical Necessity) [phone]   DISCHARGE SUPPORT TEAM ([facility]) [phone]   PARENT CHILD HEALTH (Maternity/NICU/Pediatrics) [phone]   [facility] [phone]   [facility] [phone]   [facility] [phone]   [facility] [phone]   [facility] [phone]   [facility] [phone]   [facility] [phone]   [facility] [phone]   [facility]  [facility] [phone]   [facility] [phone]   [facility] [phone]   [facility] [phone]

## 2025-06-26 NOTE — TELEPHONE ENCOUNTER
Left message for patient to call central scheduling ([phone]) to schedule NM Stress Test and 48hour Holter.

## 2025-06-26 NOTE — PROGRESS NOTES
Outpatient Care Management Note    HRR referral received. Chart review completed.  Per chart, patient with PMH that includes: crohn's disease, s/p ileostomy reversal, CAD, COPD and type 2 diabetes.    Patient was hospitalized at [facility] 6/20/25-6/25/25 for small bowel obstruction.  Per chart notes, patient presented with abdominal pain and nausea. CT of abdomen/pelvis showed partial small bowel obstruction secondary to active crohn's disease.     RN CM will place outreach call to patient for follow up to this hospitalization and to assess for care management needs.    Call placed. No patient answer received. Left VM message and included RN CM contact information and request for return call.    RN CM will schedule another outreach attempt for early next week if no patient response received prior.

## 2025-06-26 NOTE — TELEPHONE ENCOUNTER
----- Message from Luz Maria Carl MD sent at 6/25/2025 11:32 AM EDT -----  Regarding: Outpatient IBD appointment  Hello,    Patient was recently admitted to the hospital for a Crohn's flare and worsening disease progression.  Would you be able to get this patient in the IBD clinic and appointment soon to discuss treatment options as he is only on sulfasalazine.  Case was also discussed with Dr. Kinney.  An appointment within the month would be helpful.    Thank you,  Luz Maria

## 2025-06-30 ENCOUNTER — PATIENT OUTREACH (OUTPATIENT)
Dept: CASE MANAGEMENT | Facility: OTHER | Age: 65
End: 2025-06-30

## 2025-06-30 NOTE — PROGRESS NOTES
Outpatient Care Management Note    CM outreach due reminder: HRR referral- second outreach attempt.  Chart reviewed.  Per chart, PMH includes: crohn's disease, s/p ileostomy reversal, CAD, COPD, and type 2 diabetes.  Patient was hospitalized at [facility] 6/20/25-6/25/25 for small bowel obstruction.    RN CM was unable to reach patient during initial outreach attempt on 6/26/25. Second call attempt made now.  No patient answer received. Left VM message and included RN CM contact information.    UTR letter sent to patient now via [employer].    RN CM will remove self from care team and close care management episode/referral at this time as patient is unable to reach.

## 2025-06-30 NOTE — LETTER
Date: 06/30/25    Dear Mathew Bird,   My name is Nataliia; I am a registered nurse care manager working with   [facility] [place] [id number] [facility] [phone]  I have not been able to reach you and would like to set a time that I can talk with you over the phone.  My work is to help patients that have complex medical conditions get the care they need. This includes patients who may have been in the hospital or emergency room.    Please call me with any questions you may have. I look forward to speaking with you.  Sincerely,  Nataliia MORE, BSN, RN  [phone]  Outpatient Care Manager

## 2025-07-02 ENCOUNTER — OFFICE VISIT (OUTPATIENT)
Dept: FAMILY MEDICINE CLINIC | Facility: CLINIC | Age: 65
End: 2025-07-02
Payer: COMMERCIAL

## 2025-07-02 VITALS
RESPIRATION RATE: 17 BRPM | SYSTOLIC BLOOD PRESSURE: 122 MMHG | OXYGEN SATURATION: 97 % | WEIGHT: 192 LBS | HEART RATE: 103 BPM | HEIGHT: 66 IN | DIASTOLIC BLOOD PRESSURE: 80 MMHG | BODY MASS INDEX: 30.86 KG/M2 | TEMPERATURE: 98.5 F

## 2025-07-02 DIAGNOSIS — K50.812 CROHN'S DISEASE OF BOTH SMALL AND LARGE INTESTINE WITH INTESTINAL OBSTRUCTION (HCC): ICD-10-CM

## 2025-07-02 DIAGNOSIS — D50.8 OTHER IRON DEFICIENCY ANEMIA: ICD-10-CM

## 2025-07-02 DIAGNOSIS — E11.9 TYPE 2 DIABETES MELLITUS WITHOUT COMPLICATION, WITHOUT LONG-TERM CURRENT USE OF INSULIN (HCC): ICD-10-CM

## 2025-07-02 DIAGNOSIS — R10.9 ABDOMINAL PAIN: ICD-10-CM

## 2025-07-02 DIAGNOSIS — Z09 HOSPITAL DISCHARGE FOLLOW-UP: ICD-10-CM

## 2025-07-02 DIAGNOSIS — I95.0 IDIOPATHIC HYPOTENSION: ICD-10-CM

## 2025-07-02 DIAGNOSIS — I47.20 VENTRICULAR TACHYCARDIA (HCC): ICD-10-CM

## 2025-07-02 DIAGNOSIS — K56.609 SBO (SMALL BOWEL OBSTRUCTION) (HCC): Primary | ICD-10-CM

## 2025-07-02 DIAGNOSIS — I48.0 PAROXYSMAL ATRIAL FIBRILLATION WITH RAPID VENTRICULAR RESPONSE (HCC): ICD-10-CM

## 2025-07-02 PROCEDURE — 99496 TRANSJ CARE MGMT HIGH F2F 7D: CPT | Performed by: FAMILY MEDICINE

## 2025-07-02 RX ORDER — OXYCODONE HYDROCHLORIDE 5 MG/1
5 TABLET ORAL EVERY 4 HOURS PRN
Qty: 20 TABLET | Refills: 0 | Status: SHIPPED | OUTPATIENT
Start: 2025-07-02 | End: 2025-07-12

## 2025-07-02 NOTE — ASSESSMENT & PLAN NOTE
Diagnosed years ago.  Currently on metoprolol 25 mg daily.  Had a Zio patch that was placed prior to hospitalization but the leads would not attach well.  Because of this, cardiologist recommended that he repeat the study.  Holter monitor was ordered but has not picked up.  Do encourage patient to proceed with the Holter monitor.

## 2025-07-02 NOTE — ASSESSMENT & PLAN NOTE
Lab Results   Component Value Date    HGBA1C 6.1 (H) 04/09/2025     Controlled on 750 mg of metformin.  Was prescribed Ozempic prior to hospitalization and was held inpatient.  Recommended he continue to hold this given his abdominal pain, narcotic use, and recent small bowel obstruction.  We discussed when he could resume this with his gastroenterologist and PCP.  For now he may continue to take metformin 750 mg daily.

## 2025-07-02 NOTE — ASSESSMENT & PLAN NOTE
Patient developed hypotension after receiving several doses of IV beta-blockers for A-fib with RVR.  Blood pressure today at goal.  Heart rate mildly elevated but is in discomfort.  Continue metoprolol 25 mg daily.

## 2025-07-02 NOTE — PROGRESS NOTES
Transition of Care Visit:  Name: Mathew Bird      : 1960      MRN: [id number]  Encounter Provider: Ovi Patrick MD  Encounter Date: 2025   Encounter department: [facility]    Assessment & Plan  Type 2 diabetes mellitus without complication, without long-term current use of insulin (HCC)    Lab Results   Component Value Date    HGBA1C 6.1 (H) 2025     Controlled on 750 mg of metformin.  Was prescribed Ozempic prior to hospitalization and was held inpatient.  Recommended he continue to hold this given his abdominal pain, narcotic use, and recent small bowel obstruction.  We discussed when he could resume this with his gastroenterologist and PCP.  For now he may continue to take metformin 750 mg daily.       SBO (small bowel obstruction) (HCC)  Patient admitted  is  with small bowel obstruction in the setting of Crohn's disease.  CT showed dilated small bowel loops proximal to the ileocolonic anastomosis measuring 5 cm.  He received pain management.  Stool culture and C. difficile were negative.  Gastroenterology was consulted and recommended colonoscopy inpatient but declined and requested to be discharged and follow-up outpatient.  Patient continues to have discomfort in the lower abdomen.  Requesting refill for oxycodone.  Did agree to prescribe a few doses but explained that oxycodone or any narcotics can cause constipation and therefore worsen his symptoms.  Given his discomfort, will prescribe a few more days but will need to follow-up with gastroenterology to perform outpatient colonoscopy as recommended.       Ventricular tachycardia (HCC)  Diagnosed years ago.  Currently on metoprolol 25 mg daily.  Had a Zio patch that was placed prior to hospitalization but the leads would not attach well.  Because of this, cardiologist recommended that he repeat the study.  Holter monitor was ordered but has not picked up.  Do encourage patient to proceed with the  Holter monitor.        Idiopathic hypotension  Patient developed hypotension after receiving several doses of IV beta-blockers for A-fib with RVR.  Blood pressure today at goal.  Heart rate mildly elevated but is in discomfort.  Continue metoprolol 25 mg daily.       Abdominal pain  Continues to have abdominal pain with chronic diarrhea.  History of Crohn's and recent admission for small bowel obstruction.  Recommend outpatient follow-up with gastroenterology to perform colonoscopy that was recommended inpatient.  Orders:    oxyCODONE (Roxicodone) 5 immediate release tablet; Take 1 tablet (5 mg total) by mouth every 4 (four) hours as needed for moderate pain for up to 10 days Max Daily Amount: 30 mg    Other iron deficiency anemia             Hospital discharge follow-up         Paroxysmal atrial fibrillation with rapid ventricular response (HCC)  Had an episode inpatient and was given multiple doses of IV metoprolol  Unfortunately, he developed hypotension  Zio patch was returned prior to admission but leads wouldn't adhere properly affecting the data  Cardiology recently ordered holter instead   AC not advised by GI until colonscopy  Continue ASA and BB        Crohn's disease of both small and large intestine with intestinal obstruction (HCC)              History of Present Illness     Transitional Care Management Review:   Mathew Bird is a 65 y.o. male here for TCM follow up.     During the TCM phone call patient stated:  TCM Call (since 6/19/2025)       Date and time call was made  6/26/2025  9:48 AM    Patient was hospitialized at  [facility]    Date of Admission  06/20/25    Date of discharge  06/25/25    Diagnosis  SBO (small bowel obstruction)    Disposition  Home    Were the patients medications reviewed and updated  Yes          TCM Call (since 6/19/2025)       Scheduled for follow up?  Yes    Did you obtain your prescribed medications  Yes    Do you need help managing your prescriptions or  "medications  No    Is transportation to your appointment needed  No    I have advised the patient to call PCP with any new or worsening symptoms  Daquan Ramirez    Living Arrangements  Friends    Are you recieving home care services  No          Hospitalized for SIBO  This is the 3rd one   History of crohns   Colosncopy was reccomednnpatient but requested it to be done outpatient  Having pain in the right lower abdomen   Was prescribed ozempic for weight loss and diabetes  In the hospital advised not to take  Requesting oxycodone to be refilled   Zio patch but kept falling off and now cardiology wants holter to be done  Diarrhea chronic  Taking prednisone taper   Having palpitations           Review of Systems   Respiratory:  Positive for shortness of breath (chronic). Negative for cough and chest tightness.    Gastrointestinal:  Positive for abdominal distention, abdominal pain and diarrhea. Negative for nausea, rectal pain and vomiting.     Objective   /80 (BP Location: Left arm, Patient Position: Sitting, Cuff Size: Standard)   Pulse 103   Temp 98.5 °F (36.9 °C) (Tympanic)   Resp 17   Ht 5' 6\" (1.676 m)   Wt 87.1 kg (192 lb)   SpO2 97%   BMI 30.99 kg/m²     Physical Exam  Vitals reviewed.   Constitutional:       General: He is in acute distress (mild distress due to pain).      Appearance: Normal appearance. He is not ill-appearing or toxic-appearing.     Eyes:      Extraocular Movements: Extraocular movements intact.       Cardiovascular:      Rate and Rhythm: Normal rate and regular rhythm.      Heart sounds: No murmur heard.  Pulmonary:      Effort: Respiratory distress (on 02 with increased work of breathing) present.      Breath sounds: Normal breath sounds. No stridor. No wheezing or rales.   Abdominal:      General: There is distension.      Palpations: There is no mass.      Tenderness: There is abdominal tenderness. There is no guarding or rebound.      Hernia: A hernia is present. "     Musculoskeletal:      Right lower leg: No edema.      Left lower leg: No edema.     Neurological:      Mental Status: He is alert and oriented to person, place, and time.     Psychiatric:         Mood and Affect: Mood normal.         Behavior: Behavior normal.       Medications have been reviewed by provider in current encounter    Administrative Statements   I have spent a total time of 40 minutes in caring for this patient on the day of the visit/encounter including Diagnostic results, Risks and benefits of tx options, Instructions for management, Patient and family education, Importance of tx compliance, Risk factor reductions, Impressions, Counseling / Coordination of care, Documenting in the medical record, Reviewing/placing orders in the medical record (including tests, medications, and/or procedures), and Obtaining or reviewing history  .

## 2025-07-02 NOTE — ASSESSMENT & PLAN NOTE
Patient admitted 6/22 is 6/25 with small bowel obstruction in the setting of Crohn's disease.  CT showed dilated small bowel loops proximal to the ileocolonic anastomosis measuring 5 cm.  He received pain management.  Stool culture and C. difficile were negative.  Gastroenterology was consulted and recommended colonoscopy inpatient but declined and requested to be discharged and follow-up outpatient.  Patient continues to have discomfort in the lower abdomen.  Requesting refill for oxycodone.  Did agree to prescribe a few doses but explained that oxycodone or any narcotics can cause constipation and therefore worsen his symptoms.  Given his discomfort, will prescribe a few more days but will need to follow-up with gastroenterology to perform outpatient colonoscopy as recommended.

## 2025-07-03 ENCOUNTER — TELEPHONE (OUTPATIENT)
Age: 65
End: 2025-07-03

## 2025-07-03 NOTE — ASSESSMENT & PLAN NOTE
Had an episode inpatient and was given multiple doses of IV metoprolol  Unfortunately, he developed hypotension  Zio patch was returned prior to admission but leads wouldn't adhere properly affecting the data  Cardiology recently ordered holter instead   AC not advised by GI until colonscopy  Continue ASA and BB

## 2025-07-03 NOTE — TELEPHONE ENCOUNTER
Call from the patient stating that he is taking Trelegy 200 for his stage 4 COPD. He stated he has been doing some reading about Trelegy 100 vs. 200 and 100 is the one that is recommended for COPD and 200 for asthma. He wants to make sure that he has the correct Trelegy prescribed for him. He would like a call back to discuss.

## 2025-07-08 NOTE — TELEPHONE ENCOUNTER
Attempted to call patient multiple times but did not get a response. Will try again tomorrow. I did send a [employer] message to discuss his question further in the meantime.     Devang King M.D.  Pulmonary & Critical Care Fellow, PGY-V

## 2025-07-10 DIAGNOSIS — R10.9 ABDOMINAL PAIN: ICD-10-CM

## 2025-07-10 RX ORDER — OXYCODONE HYDROCHLORIDE 5 MG/1
5 TABLET ORAL EVERY 4 HOURS PRN
Qty: 20 TABLET | Refills: 0 | OUTPATIENT
Start: 2025-07-10 | End: 2025-07-20

## 2025-07-10 NOTE — TELEPHONE ENCOUNTER
Reason for call:   [x] Refill   [] Prior Auth  [x] Other: Patient has been getting this medication through hospital visits for on going abdominal issues. He stated he would like to know if  can refill this medication so he does not have to keep going to the ER. Please review. Patient stated he is currently out of medication.    Office:   [x] PCP/Provider -   Ordering Department: [facility]  Authorized By: Mariola Doyle MD  [] Specialty/Provider -     Medication: oxyCODONE (Roxicodone) 5 immediate release tablet     Dose/Frequency: Take 1 tablet (5 mg total) by mouth every 4 (four) hours as needed for moderate pain for up to 10 days Max Daily Amount: 30 mg     Quantity: 20    Pharmacy: [facility]/pharmacy #[id number]  [place], [place] - [address] [place] [address] [phone]    Local Pharmacy   Does the patient have enough for 3 days?   [] Yes   [x] No - Send as HP to POD    Mail Away Pharmacy   Does the patient have enough for 10 days?   [] Yes   [x] No - Send as HP to POD

## 2025-07-10 NOTE — TELEPHONE ENCOUNTER
Medication:  PDMP   07/02/2025 07/02/2025 oxyCODONE HCL (Tablet) 20.0 4 5 MG 37.50 COURTNEY GOYAL     Active agreement on file -No

## 2025-07-10 NOTE — TELEPHONE ENCOUNTER
Refill must be reviewed and completed by the office or provider. The refill is unable to be approved or denied by the medication management team.      Medication is not delegated

## 2025-07-11 DIAGNOSIS — R10.9 ABDOMINAL PAIN: ICD-10-CM

## 2025-07-11 RX ORDER — OXYCODONE HYDROCHLORIDE 5 MG/1
5 TABLET ORAL EVERY 4 HOURS PRN
Qty: 20 TABLET | Refills: 0 | OUTPATIENT
Start: 2025-07-11 | End: 2025-07-21

## 2025-07-11 NOTE — TELEPHONE ENCOUNTER
Requested medication(s) are due for refill today: Yes  Patient has already received a courtesy refill: No  Other reason request has been forwarded to provider: PLEASE REFUSE

## 2025-07-11 NOTE — TELEPHONE ENCOUNTER
Patient called Rx refill line and is asking why the Rx for the Oxycodone was refused - he states he does not have any meds left     Please contact pt back today

## 2025-08-18 ENCOUNTER — TELEPHONE (OUTPATIENT)
Age: 65
End: 2025-08-18

## 2025-08-19 DIAGNOSIS — M79.643 PAIN OF HAND, UNSPECIFIED LATERALITY: ICD-10-CM

## 2025-08-19 DIAGNOSIS — K43.2 INCISIONAL HERNIA, WITHOUT OBSTRUCTION OR GANGRENE: Primary | ICD-10-CM

## 2025-08-19 RX ORDER — METHYLPREDNISOLONE 4 MG/1
TABLET ORAL
Qty: 21 EACH | Refills: 0 | Status: SHIPPED | OUTPATIENT
Start: 2025-08-19

## (undated) DEVICE — PENROSE DRAIN, 18 X 3 8: Brand: CARDINAL HEALTH

## (undated) DEVICE — ADHESIVE REMOVER: Brand: UNI-SOLVE ADHESIVE REMOVER 8OZ US

## (undated) DEVICE — MAYO STAND COVER: Brand: CONVERTORS

## (undated) DEVICE — ABDOMINAL PAD: Brand: DERMACEA

## (undated) DEVICE — SUT VICRYL 0 REEL 54 IN J287G

## (undated) DEVICE — PROXIMATE RELOADABLE LINEAR CUTTER WITH SAFETY LOCK-OUT, 75MM: Brand: PROXIMATE

## (undated) DEVICE — SUT VICRYL 0 CT-1 18 IN J740D

## (undated) DEVICE — SPONGE STICK WITH PVP-I: Brand: KENDALL

## (undated) DEVICE — SUT MONOCRYL 4-0 PS-2 18 IN Y496G

## (undated) DEVICE — POOLE SUCTION HANDLE: Brand: CARDINAL HEALTH

## (undated) DEVICE — GAUZE SPONGES,16 PLY: Brand: CURITY

## (undated) DEVICE — SURGICEL 4 X 8

## (undated) DEVICE — SUT VICRYL 0 54 IN J207G

## (undated) DEVICE — INTENDED FOR TISSUE SEPARATION, AND OTHER PROCEDURES THAT REQUIRE A SHARP SURGICAL BLADE TO PUNCTURE OR CUT.: Brand: BARD-PARKER SAFETY BLADES SIZE 15, STERILE

## (undated) DEVICE — TELFA NON-ADHERENT ABSORBENT DRESSING: Brand: TELFA

## (undated) DEVICE — OSTOMY LOOP ROD 65 MM

## (undated) DEVICE — PROXIMATE LINEAR CUTTER RELOAD, BLUE, 75MM: Brand: PROXIMATE

## (undated) DEVICE — PLUMEPEN PRO 10FT

## (undated) DEVICE — JACKSON-PRATT 100CC BULB RESERVOIR: Brand: CARDINAL HEALTH

## (undated) DEVICE — LYOFOAM TRAY 4 X 4

## (undated) DEVICE — SUT VICRYL 2-0 SH 27 IN UNDYED J417H

## (undated) DEVICE — 3M™ IOBAN™ 2 ANTIMICROBIAL INCISE DRAPE 6650EZ: Brand: IOBAN™ 2

## (undated) DEVICE — STERILE MAJOR GENERAL PACK: Brand: CARDINAL HEALTH

## (undated) DEVICE — SUT VICRYL 3-0 SH 27 IN J416H

## (undated) DEVICE — SCD SEQUENTIAL COMPRESSION COMFORT SLEEVE MEDIUM KNEE LENGTH: Brand: KENDALL SCD

## (undated) DEVICE — JP PERF DRN SIL FLT 10MM FULL: Brand: CARDINAL HEALTH

## (undated) DEVICE — SUT PROLENE 3-0 KS 30 IN 8622H

## (undated) DEVICE — SUT PDS II 3-0 SH 27 IN Z316H

## (undated) DEVICE — VIOLET BRAIDED (POLYGLACTIN 910), SYNTHETIC ABSORBABLE SUTURE: Brand: COATED VICRYL

## (undated) DEVICE — SUT SILK 3-0 SH CR/8 18 IN C013D

## (undated) DEVICE — SUT ETHILON 3-0 FSLX 30 IN 1673H

## (undated) DEVICE — CHLORAPREP HI-LITE 26ML ORANGE

## (undated) DEVICE — SUT VICRYL 2-0 REEL 54 IN J286G

## (undated) DEVICE — NEEDLE 25G X 1 1/2

## (undated) DEVICE — TRAY FOLEY 16FR URIMETER SURESTEP

## (undated) DEVICE — INTENDED FOR TISSUE SEPARATION, AND OTHER PROCEDURES THAT REQUIRE A SHARP SURGICAL BLADE TO PUNCTURE OR CUT.: Brand: BARD-PARKER SAFETY BLADES SIZE 10, STERILE

## (undated) DEVICE — TOWEL SET X-RAY

## (undated) DEVICE — ASTOUND STANDARD SURGICAL GOWN, XXL: Brand: CONVERTORS

## (undated) DEVICE — SUT PDS II 1 XLH 96 IN LOOPED Z881G

## (undated) DEVICE — 3000CC GUARDIAN II: Brand: GUARDIAN

## (undated) DEVICE — SUT PDS II 1 CTX 36 IN Z371T